# Patient Record
Sex: FEMALE | Race: BLACK OR AFRICAN AMERICAN | NOT HISPANIC OR LATINO | Employment: UNEMPLOYED | URBAN - METROPOLITAN AREA
[De-identification: names, ages, dates, MRNs, and addresses within clinical notes are randomized per-mention and may not be internally consistent; named-entity substitution may affect disease eponyms.]

---

## 2017-01-24 ENCOUNTER — PRE VISIT (OUTPATIENT)
Dept: CARDIOLOGY | Facility: CLINIC | Age: 52
End: 2017-01-24

## 2017-01-24 DIAGNOSIS — E78.5 HYPERLIPIDEMIA LDL GOAL <70: Primary | ICD-10-CM

## 2017-01-24 DIAGNOSIS — E11.9 TYPE 2 DIABETES, HBA1C GOAL < 7% (H): Primary | ICD-10-CM

## 2017-01-24 DIAGNOSIS — I10 HTN (HYPERTENSION): Primary | ICD-10-CM

## 2017-01-24 RX ORDER — LISINOPRIL AND HYDROCHLOROTHIAZIDE 20; 25 MG/1; MG/1
TABLET ORAL
Qty: 90 TABLET | Refills: 3 | Status: SHIPPED | OUTPATIENT
Start: 2017-01-24 | End: 2017-12-20

## 2017-01-24 RX ORDER — HYDRALAZINE HYDROCHLORIDE 25 MG/1
TABLET, FILM COATED ORAL
Qty: 90 TABLET | Refills: 3 | Status: SHIPPED | OUTPATIENT
Start: 2017-01-24 | End: 2017-01-25

## 2017-01-25 ENCOUNTER — OFFICE VISIT (OUTPATIENT)
Dept: PHARMACY | Facility: CLINIC | Age: 52
End: 2017-01-25
Payer: COMMERCIAL

## 2017-01-25 ENCOUNTER — OFFICE VISIT (OUTPATIENT)
Dept: FAMILY MEDICINE | Facility: CLINIC | Age: 52
End: 2017-01-25

## 2017-01-25 ENCOUNTER — OFFICE VISIT (OUTPATIENT)
Dept: CARDIOLOGY | Facility: CLINIC | Age: 52
End: 2017-01-25
Attending: INTERNAL MEDICINE
Payer: COMMERCIAL

## 2017-01-25 VITALS
SYSTOLIC BLOOD PRESSURE: 100 MMHG | BODY MASS INDEX: 30.83 KG/M2 | DIASTOLIC BLOOD PRESSURE: 64 MMHG | OXYGEN SATURATION: 99 % | HEIGHT: 63 IN | HEART RATE: 69 BPM | WEIGHT: 174 LBS

## 2017-01-25 VITALS
DIASTOLIC BLOOD PRESSURE: 64 MMHG | BODY MASS INDEX: 30.83 KG/M2 | WEIGHT: 174 LBS | HEART RATE: 69 BPM | SYSTOLIC BLOOD PRESSURE: 100 MMHG

## 2017-01-25 DIAGNOSIS — M35.9 UNDIFFERENTIATED CONNECTIVE TISSUE DISEASE (H): ICD-10-CM

## 2017-01-25 DIAGNOSIS — E11.9 DM TYPE 2, GOAL HBA1C 7%-8% (H): Primary | ICD-10-CM

## 2017-01-25 DIAGNOSIS — E11.69 HYPERLIPIDEMIA ASSOCIATED WITH TYPE 2 DIABETES MELLITUS (H): ICD-10-CM

## 2017-01-25 DIAGNOSIS — E11.59 HYPERTENSION ASSOCIATED WITH DIABETES (H): ICD-10-CM

## 2017-01-25 DIAGNOSIS — E78.5 HYPERLIPIDEMIA LDL GOAL <70: ICD-10-CM

## 2017-01-25 DIAGNOSIS — E78.5 HYPERLIPIDEMIA ASSOCIATED WITH TYPE 2 DIABETES MELLITUS (H): ICD-10-CM

## 2017-01-25 DIAGNOSIS — E55.9 VITAMIN D DEFICIENCY: Primary | ICD-10-CM

## 2017-01-25 DIAGNOSIS — I25.10 CORONARY ARTERY DISEASE INVOLVING NATIVE HEART, ANGINA PRESENCE UNSPECIFIED, UNSPECIFIED VESSEL OR LESION TYPE: ICD-10-CM

## 2017-01-25 DIAGNOSIS — I10 ESSENTIAL HYPERTENSION: Primary | ICD-10-CM

## 2017-01-25 DIAGNOSIS — E11.69 TYPE 2 DIABETES MELLITUS WITH OTHER SPECIFIED COMPLICATION (H): ICD-10-CM

## 2017-01-25 DIAGNOSIS — I15.2 HYPERTENSION ASSOCIATED WITH DIABETES (H): ICD-10-CM

## 2017-01-25 DIAGNOSIS — M79.7 FIBROMYALGIA: ICD-10-CM

## 2017-01-25 LAB
ALBUMIN SERPL-MCNC: 4.1 G/DL (ref 3.4–5)
ALBUMIN UR-MCNC: NEGATIVE MG/DL
ALP SERPL-CCNC: 67 U/L (ref 40–150)
ALT SERPL W P-5'-P-CCNC: 25 U/L (ref 0–50)
ANION GAP SERPL CALCULATED.3IONS-SCNC: 9 MMOL/L (ref 3–14)
APPEARANCE UR: CLEAR
AST SERPL W P-5'-P-CCNC: 19 U/L (ref 0–45)
BACTERIA #/AREA URNS HPF: ABNORMAL /HPF
BASOPHILS # BLD AUTO: 0.1 10E9/L (ref 0–0.2)
BASOPHILS NFR BLD AUTO: 0.5 %
BILIRUB SERPL-MCNC: 0.3 MG/DL (ref 0.2–1.3)
BILIRUB UR QL STRIP: NEGATIVE
BUN SERPL-MCNC: 23 MG/DL (ref 7–30)
CALCIUM SERPL-MCNC: 9.1 MG/DL (ref 8.5–10.1)
CHLORIDE SERPL-SCNC: 106 MMOL/L (ref 94–109)
CHOLEST SERPL-MCNC: 118 MG/DL
CO2 SERPL-SCNC: 23 MMOL/L (ref 20–32)
COLOR UR AUTO: YELLOW
CREAT SERPL-MCNC: 1 MG/DL (ref 0.52–1.04)
CREAT UR-MCNC: 66 MG/DL
CRP SERPL-MCNC: <2.9 MG/L (ref 0–8)
DIFFERENTIAL METHOD BLD: ABNORMAL
EOSINOPHIL # BLD AUTO: 0.4 10E9/L (ref 0–0.7)
EOSINOPHIL NFR BLD AUTO: 3.1 %
ERYTHROCYTE [DISTWIDTH] IN BLOOD BY AUTOMATED COUNT: 13.4 % (ref 10–15)
ERYTHROCYTE [SEDIMENTATION RATE] IN BLOOD BY WESTERGREN METHOD: 23 MM/H (ref 0–30)
GFR SERPL CREATININE-BSD FRML MDRD: 59 ML/MIN/1.7M2
GLUCOSE SERPL-MCNC: 79 MG/DL (ref 70–99)
GLUCOSE UR STRIP-MCNC: NEGATIVE MG/DL
HCT VFR BLD AUTO: 33.7 % (ref 35–47)
HDLC SERPL-MCNC: 35 MG/DL
HGB BLD-MCNC: 11.1 G/DL (ref 11.7–15.7)
HGB UR QL STRIP: NEGATIVE
IMM GRANULOCYTES # BLD: 0 10E9/L (ref 0–0.4)
IMM GRANULOCYTES NFR BLD: 0.2 %
KETONES UR STRIP-MCNC: NEGATIVE MG/DL
LDLC SERPL CALC-MCNC: 58 MG/DL
LEUKOCYTE ESTERASE UR QL STRIP: NEGATIVE
LYMPHOCYTES # BLD AUTO: 3 10E9/L (ref 0.8–5.3)
LYMPHOCYTES NFR BLD AUTO: 23.8 %
MCH RBC QN AUTO: 26.1 PG (ref 26.5–33)
MCHC RBC AUTO-ENTMCNC: 32.9 G/DL (ref 31.5–36.5)
MCV RBC AUTO: 79 FL (ref 78–100)
MONOCYTES # BLD AUTO: 0.8 10E9/L (ref 0–1.3)
MONOCYTES NFR BLD AUTO: 6 %
MUCOUS THREADS #/AREA URNS LPF: PRESENT /LPF
NEUTROPHILS # BLD AUTO: 8.3 10E9/L (ref 1.6–8.3)
NEUTROPHILS NFR BLD AUTO: 66.4 %
NITRATE UR QL: NEGATIVE
NONHDLC SERPL-MCNC: 83 MG/DL
NRBC # BLD AUTO: 0 10*3/UL
NRBC BLD AUTO-RTO: 0 /100
PH UR STRIP: 6 PH (ref 5–7)
PLATELET # BLD AUTO: 328 10E9/L (ref 150–450)
POTASSIUM SERPL-SCNC: 4.4 MMOL/L (ref 3.4–5.3)
PROT SERPL-MCNC: 7.4 G/DL (ref 6.8–8.8)
PROT UR-MCNC: 0.08 G/L
PROT/CREAT 24H UR: 0.12 G/G CR (ref 0–0.2)
RBC # BLD AUTO: 4.26 10E12/L (ref 3.8–5.2)
RBC #/AREA URNS AUTO: 1 /HPF (ref 0–2)
SODIUM SERPL-SCNC: 138 MMOL/L (ref 133–144)
SP GR UR STRIP: 1.01 (ref 1–1.03)
SQUAMOUS #/AREA URNS AUTO: 2 /HPF (ref 0–1)
TRIGL SERPL-MCNC: 125 MG/DL
URN SPEC COLLECT METH UR: ABNORMAL
UROBILINOGEN UR STRIP-MCNC: 0 MG/DL (ref 0–2)
WBC # BLD AUTO: 12.5 10E9/L (ref 4–11)
WBC #/AREA URNS AUTO: 3 /HPF (ref 0–2)

## 2017-01-25 PROCEDURE — 99212 OFFICE O/P EST SF 10 MIN: CPT | Mod: 27,ZF

## 2017-01-25 PROCEDURE — 99607 MTMS BY PHARM ADDL 15 MIN: CPT | Performed by: PHARMACIST

## 2017-01-25 PROCEDURE — 85652 RBC SED RATE AUTOMATED: CPT | Performed by: INTERNAL MEDICINE

## 2017-01-25 PROCEDURE — 99606 MTMS BY PHARM EST 15 MIN: CPT | Performed by: PHARMACIST

## 2017-01-25 PROCEDURE — 84156 ASSAY OF PROTEIN URINE: CPT | Performed by: INTERNAL MEDICINE

## 2017-01-25 PROCEDURE — 85025 COMPLETE CBC W/AUTO DIFF WBC: CPT | Performed by: INTERNAL MEDICINE

## 2017-01-25 PROCEDURE — 99214 OFFICE O/P EST MOD 30 MIN: CPT | Mod: ZP | Performed by: INTERNAL MEDICINE

## 2017-01-25 PROCEDURE — 82306 VITAMIN D 25 HYDROXY: CPT | Performed by: INTERNAL MEDICINE

## 2017-01-25 PROCEDURE — 86140 C-REACTIVE PROTEIN: CPT | Performed by: INTERNAL MEDICINE

## 2017-01-25 PROCEDURE — 81001 URINALYSIS AUTO W/SCOPE: CPT | Performed by: INTERNAL MEDICINE

## 2017-01-25 PROCEDURE — 36415 COLL VENOUS BLD VENIPUNCTURE: CPT | Performed by: INTERNAL MEDICINE

## 2017-01-25 RX ORDER — METOPROLOL SUCCINATE 100 MG/1
100 TABLET, EXTENDED RELEASE ORAL DAILY
Qty: 90 TABLET | Refills: 3 | Status: ON HOLD | OUTPATIENT
Start: 2017-01-25 | End: 2017-08-04

## 2017-01-25 RX ORDER — HYDRALAZINE HYDROCHLORIDE 25 MG/1
12.5 TABLET, FILM COATED ORAL DAILY
Qty: 90 TABLET | Refills: 3 | Status: SHIPPED | OUTPATIENT
Start: 2017-01-25 | End: 2017-04-04

## 2017-01-25 RX ORDER — ERGOCALCIFEROL 1.25 MG/1
50000 CAPSULE, LIQUID FILLED ORAL
Qty: 8 CAPSULE | Refills: 0 | Status: SHIPPED
Start: 2017-01-25 | End: 2019-10-30

## 2017-01-25 RX ORDER — CLOPIDOGREL BISULFATE 75 MG/1
75 TABLET ORAL DAILY
Qty: 90 TABLET | Refills: 3 | Status: SHIPPED | OUTPATIENT
Start: 2017-01-25 | End: 2017-12-20

## 2017-01-25 ASSESSMENT — PAIN SCALES - GENERAL
PAINLEVEL: NO PAIN (0)
PAINLEVEL: MODERATE PAIN (4)

## 2017-01-25 NOTE — NURSING NOTE
Chief Complaint   Patient presents with     Recheck Medication     Pt is here to follow up on medications.      Shyanne Stallworth LPN January 25, 2017 12:55 PM

## 2017-01-25 NOTE — PATIENT INSTRUCTIONS
[facility] Medication Refill Request Information:  * Please contact your pharmacy regarding ANY request for medication refills.  ** [facility] Prescription Fax = [phone]  * Please allow 3 business days for routine medication refills.  * Please allow 5 business days for controlled substance medication refills.     [facility] Test Result notification information:  *You will be notified with in 7-10 days of your appointment day regarding the results of your test.  If you are on MyChart you will be notified as soon as the provider has reviewed the results and signed off on them.

## 2017-01-25 NOTE — PROGRESS NOTES
"SUBJECTIVE/OBJECTIVE:                                                    Janine Cornell is a 51 year old female coming in for a follow up visit for Medication Therapy Management.  She was referred to me from Dr. Solano.     Chief Complaint: Lantus is no longer covered. Dr. Solano would like me to look into it.  Pt is very resistant to trying another medication.  Vitamin D: whenever she goes off of the 50,000 units daily, she becomes deficient.   One Touch Verio strips.    Allergies/ADRs: Reviewed in Epic  Tobacco: 0-1 pack per day - is not interested in quitting Tobacco Cessation Action Plan: Lives in a house with smokers. Has not tried anything to quit.   Alcohol: Less than 1 beverage / month  Caffeine: 2 cups/day of coffee  Activity: Not so much in the winter.  PMH: Per patient 5 stents, last placed Feb of this year, diabetes, \"mystery autoimmune disease\" she is seeing a rheumotologist     Medication Adherence: pt is on a lot of medications, only takes them at night cause they make her so tired. \"Its a pain\" having to take them. She gets Nausea, headaches, extreme fatigue, limbs become heavy.     Vitamin D deficiency: Pt is taking Vitamin D 50,000 units q weekly. Everytime she comes off of this her Vitamin D levels decrease per patient report.     Hypertension: Current medications include Hydralazine 50mg qHS, Lisinopril 20mg daily, HCTZ 25mg daily, Metoprolol XL 100mg daily.  Patient reports the following medication side effects: dizziness.    Diabetes: Pt currently taking Lantus 40 units daily, Metformin 1000mg XR daily, has had stomach problems at higher doses. Has loose some at 1000mg as well, but it is manageable. She states her insurance is no longer covering her brand of test strips. She thinks they may be covering One Touch, but is not sure.   SMBG: three times daily, four times daily.   Symptoms of low blood sugar? dizzy, weak, blurred vision, nausea. Frequency of hypoglycemia? monthly.   Aspirin: " Taking 81mg daily and has the following issues: bruising, very easily bruises. No nose bleeds/ gum bleeds.     Fibromyalgia/ Arthritis: Pt is taking APAP, Cyclobenzaprine, Tramadol. She takes APAP infrequently she takes the minimum amount due to being afraid of causing kidney/ liver issues. She does acupuncture which is effective helping her reduce her pain. She is taking Cyclobenzaprine 10mg qHS prn, gets groggy for the next few days, but when the pain is bad enough she takes it twice a day and just deals with the grogginess. She takes a 1/2 tablet prn pain most of the time. She usually only takes 1 pain med at a time. Does not like to mix pain meds and muscle relaxers (eg. APAP and cyclobenzaprine). Ketoprofen compounded gel is no longer covered by insurance.     Current labs include:  BP Readings from Last 3 Encounters:   01/25/17 100/64   01/25/17 100/64   12/29/16 107/73     A1C      7.2   12/26/2016  A1C      7.7   7/27/2016  A1C      7.9   4/29/2016  A1C      7.0   1/27/2016  A1C      6.6   10/30/2015    CHOL      106   7/27/2016  TRIG      104   7/27/2016  HDL       36   7/27/2016  LDL       48   7/27/2016    Liver Function Studies -   Recent Labs   Lab Test  07/27/16   1618   PROTTOTAL  7.9   ALBUMIN  4.4   BILITOTAL  0.3   ALKPHOS  72   AST  18   ALT  23       MICROL       13   8/11/2015  No results found for this basename: microalbumin    Last Basic Metabolic Panel:  NA      138   7/27/2016   POTASSIUM      4.1   7/27/2016  CHLORIDE      104   7/27/2016  BUN       24   7/27/2016  CR     1.26   7/27/2016    GFR ESTIMATE IF BLACK   Date Value Ref Range Status   01/25/2017 71 >60 mL/min/1.7m2 Final     Comment:      GFR Calc   12/26/2016 62 >60 mL/min/1.7m2 Final     Comment:      GFR Calc   07/27/2016 54* >60 mL/min/1.7m2 Final     Comment:      GFR Calc     TSH   Date Value Ref Range Status   12/26/2016 0.24* 0.40 - 4.00 mU/L Final   ]  LMP  (LMP  Unknown)    Most Recent Immunizations   Administered Date(s) Administered     Influenza (IIV3) 10/14/2016     Influenza Vaccine IM 3yrs+ 4 Valent IIV4 09/29/2015     Pneumococcal (PCV 13) 12/29/2016     TDAP (ADACEL AGES 11-64) 11/30/2006     TDAP (BOOSTRIX AGES 10-64) 12/29/2016     ASSESSMENT:                                                       Current medications were reviewed today.     Medication Adherence: needs improvement - see below.   Pt states she is very sensitive to medications, does not like changing or adding on medications. She takes all her medications in the evening as she has the follow side effects: fatigue, nausea, headaches. Much of the time was spent just going through her extensive medication list.     Vitamin D deficiency: Needs improvement. We will continue high dose vitamin D for another 2 months, will check Vitamin D level at the end of it.     Hypertension: Needs improvement. BP at goal today <140/90. Pt has not stopped Hydralazine. She states the additional directions given to her (only to take if SBP >140) are too complicated. I recommended she hold this medication for now. Her BP is very well controlled.     Diabetes: Stable. A1c has decreased to 7.2%. Pt is resistant to changing Lantus. I think it would be appropriate to try a PA. If it is not e    Fibromyalgia/ Arthritis: Needs improvement. Voltaren gel should be a covered alternative to Ketoprofen gel. Dr. Solano did not respond to this at last visit. Pt does not want to try a different muscle relaxant at this time.     PLAN:                                                      Dr. Peace...  1. I am having patient hold her Hydralazine. She is confused about the prn directions.     Dr. Solano consider...  1. Voltaren Gel in place of Ketoprofen compounded gel. It will likely be covered.   2. PA for Lantus- pt would not like to try another insulin at this time.     Pt to...  1. Continue high dose vitamin D for 2 more months  with 2 month lab.    I spent 45 minutes with this patient today.  All changes were made via collaborative practice agreement with Kash Solano A copy of the visit note was provided to the patient's primary care provider.    Will follow up in 1 month to review allergies med use, diabetes, asthma, and try to cut down on med use.    The patient was given a summary of these recommendations as an after visit summary.     Jose Luis Metcalf, PharmD  [facility] Pharmacist    Phone: [phone]

## 2017-01-25 NOTE — Clinical Note
Dr. Solano consider... 1. Voltaren Gel in place of Ketoprofen compounded gel. It will likely be covered.  2. PA for Lantus- pt would not like to try another insulin at this time.

## 2017-01-25 NOTE — PATIENT INSTRUCTIONS
Recommendations from today's MTM visit:                                                      1. Talk to you Cardiologist about stopping your Hydralazine. You do not need this anymore.     2. Take your Vitamin D 50,000 units for another 2 months, we will recheck these levels when you have completed this therapy.     3. I will talk with Dr. Solano about getting a PA for Lantus and trying Voltaren gel.     Next MTM visit: 2/24/17 after your visit with Dr. Solano.     To schedule another MTM appointment, please call the clinic directly or you may call the MTM scheduling line at [phone] or toll-free at [phone].     My Clinical Pharmacist's contact information:                                                      It was a pleasure seeing you today!  Please feel free to contact me with any questions or concerns you have.      Jose Luis Metcalf, PharmD  [facility] Pharmacist    Phone: [phone]     You may receive a survey about the MTM services you received.  I would appreciate your feedback to help me serve you better in the future. Please fill it out and return it when you can. Your comments will be anonymous.

## 2017-01-25 NOTE — PATIENT INSTRUCTIONS
Return to clinic in 6 months.  Fasting labs will be needed prior to this appointment.     Please do not hesitate to call if you have any cardiology related questions or concerns, or need to schedule an appointment, at [phone].         Cardiology Medication Refill Request Information:  * Please contact your pharmacy regarding ANY request for medication refills.  ** [facility] Prescription Fax = [phone]  * Please allow 3 business days for routine medication refills.    Cardiology Test Result notification information:  *You will be notified with in 7-10 days of your appointment day regarding the results of your test. If you are on MyChart you will be notified as soon as the provider has reviewed the results and signed off on them. Please call RN Care Coordinator with questions regarding results.

## 2017-01-25 NOTE — MR AVS SNAPSHOT
After Visit Summary   1/25/2017    Janine Cornell    MRN: [id number]           Patient Information     Date Of Birth          1965        Visit Information        Provider Department      1/25/2017 3:00 PM Jose Luis Metcalf [facility] Medication Therapy Management        Care Instructions    Recommendations from today's MTM visit:                                                      1. Talk to you Cardiologist about stopping your Hydralazine. You do not need this anymore.     2. Take your Vitamin D 50,000 units for another 2 months, we will recheck these levels when you have completed this therapy.     3. I will talk with Dr. Solano about getting a PA for Lantus and trying Voltaren.     Next MTM visit: 2/24/17 after your visit with Dr. Solano.     To schedule another MTM appointment, please call the clinic directly or you may call the MTM scheduling line at [phone] or toll-free at [phone].     My Clinical Pharmacist's contact information:                                                      It was a pleasure seeing you today!  Please feel free to contact me with any questions or concerns you have.      Jose Luis Metcalf, PharmD  [facility] Pharmacist    Phone: [phone]     You may receive a survey about the MTM services you received.  I would appreciate your feedback to help me serve you better in the future. Please fill it out and return it when you can. Your comments will be anonymous.            Follow-ups after your visit        Your next 10 appointments already scheduled     Feb 24, 2017  2:05 PM   (Arrive by 1:50 PM)   Return Visit with Kash Solano MD   [facility] ([facility])    [address] [place] [id number] [phone]              Who to contact     If you have questions or need follow up information about today's clinic visit or your schedule please contact [facility] MEDICATION THERAPY  MANAGEMENT directly at No information on file..  Normal or non-critical lab and imaging results will be communicated to you by [employer], letter or phone within 4 business days after the clinic has received the results. If you do not hear from us within 7 days, please contact the clinic through [employer] or phone. If you have a critical or abnormal lab result, we will notify you by phone as soon as possible.  Submit refill requests through [employer] or call your pharmacy and they will forward the refill request to us. Please allow 3 business days for your refill to be completed.          Additional Information About Your Visit        [employer] Information     [employer] gives you secure access to your electronic health record. If you see a primary care provider, you can also send messages to your care team and make appointments. If you have questions, please call your primary care clinic.  If you do not have a primary care provider, please call [phone] and they will assist you.        Care EveryWhere ID     This is your Care EveryWhere ID. This could be used by other organizations to access your [facility] medical records  [id number]        Your Vitals Were     Last Period                   (LMP Unknown)            Blood Pressure from Last 3 Encounters:   01/25/17 100/64   01/25/17 100/64   12/29/16 107/73    Weight from Last 3 Encounters:   01/25/17 174 lb (78.926 kg)   01/25/17 174 lb (78.926 kg)   12/29/16 172 lb 6.4 oz (78.2 kg)              Today, you had the following     No orders found for display         Today's Medication Changes          These changes are accurate as of: 1/25/17  3:55 PM.  If you have any questions, ask your nurse or doctor.               These medicines have changed or have updated prescriptions.        Dose/Directions    hydroxychloroquine 200 MG tablet   Commonly known as:  PLAQUENIL   This may have changed:  how much to take   Used for:  Rheumatoid arthritis(714.0)        Dose:  400 mg    Take 2 tablets (400 mg) by mouth daily   Quantity:  180 tablet   Refills:  1       insulin glargine 100 UNIT/ML injection   Commonly known as:  LANTUS   This may have changed:  how much to take   Used for:  Type 2 diabetes mellitus without complication (H)        Dose:  30 Units   Inject 30 Units Subcutaneous daily   Quantity:  9 mL   Refills:  11       Ketoprofen Powd   This may have changed:  additional instructions   Used for:  Fibromyalgia        Dose:  1 g   Apply 1 g topically 2 times daily as needed   Quantity:  100 g   Refills:  5                Primary Care Provider Office Phone # Fax #    Kash Solano [phone]       [facility] [address] [place] [id number]        Thank you!     Thank you for choosing [facility] MEDICATION THERAPY MANAGEMENT  for your care. Our goal is always to provide you with excellent care. Hearing back from our patients is one way we can continue to improve our services. Please take a few minutes to complete the written survey that you may receive in the mail after your visit with us. Thank you!             Your Updated Medication List - Protect others around you: Learn how to safely use, store and throw away your medicines at www.disposemymeds.org.          This list is accurate as of: 1/25/17  3:55 PM.  Always use your most recent med list.                   Brand Name Dispense Instructions for use    acetaminophen 325 MG tablet    TYLENOL    250 tablet    Take 1-2 tablets (325-650 mg) by mouth every 6 hours as needed for pain (headache)       AEROSPAN 80 MCG/ACT Aers   Generic drug:  Flunisolide HFA      INHALE 2 PUFFS PO BID.  RINSE MOUTH AFTERWARDS       * albuterol 108 (90 BASE) MCG/ACT Inhaler    PROAIR HFA/PROVENTIL HFA/VENTOLIN HFA    3 Inhaler    Inhale 2 puffs into the lungs every 6 hours as needed for shortness of breath / dyspnea or wheezing       * albuterol (2.5 MG/3ML) 0.083% neb solution      INL 1 VIAL VIA NEBULIZATION  Q 4 TO 6 HOURS PRN       ASPIRIN LOW DOSE 81 MG EC tablet   Generic drug:  aspirin      TK 1 T PO D       azelastine 0.1 % spray    ASTELIN    1 Bottle    Spray 2 sprays into both nostrils 2 times daily       blood glucose monitoring lancets     2 Box    Use to test blood sugar four times daily or as directed.       blood glucose monitoring meter device kit    no brand specified    1 kit    Use to test blood sugar 4 X times daily or as directed.       blood glucose monitoring test strip    no brand specified    360 each    1 strip by In Vitro route 4 times daily Test as directed. Please dispense three months and three months of refills. Thank you.       clopidogrel 75 MG tablet    PLAVIX    90 tablet    TAKE 1 TABLET BY MOUTH EVERY DAY       cyclobenzaprine 10 MG tablet    FLEXERIL    180 tablet    Take 1 tablet (10 mg) by mouth 2 times daily as needed for muscle spasms       desonide 0.05 % cream    DESOWEN     Apply topically 2 times daily       dicyclomine 20 MG tablet    BENTYL    60 tablet    Take 1 tablet (20 mg) by mouth 4 times daily as needed       diphenhydrAMINE 25 MG capsule    BENADRYL     TK 1 TO 2 CS PO QHS       EPIPEN 2-RIKY 0.3 MG/0.3ML injection   Generic drug:  EPINEPHrine      INJECT 0.3 MG INTO THE MUSCLE PRF ANAPHYALAXIS       ESOMEPRAZOLE MAGNESIUM PO      Take 20 mg by mouth 2 times daily (before meals)       ferrous gluconate 324 (38 FE) MG tablet    FERGON    100 tablet    Take 1 tablet (324 mg) by mouth 2 times daily       fexofenadine 180 MG tablet    ALLEGRA    90 tablet    Take 1 tablet by mouth daily as needed.       fluocinolone 0.01 % solution    SYNALAR     Apply topically daily as needed       fluocinonide 0.05 % solution    LIDEX    60 mL    Apply topically 2 times daily To areas of itch on the scalp as needed.       fluticasone 50 MCG/ACT spray    FLONASE     U 2 SPRAYS IEN D.       folic acid 1 MG tablet    FOLVITE         * hydrALAZINE 50 MG tablet    APRESOLINE     TK 1 T PO D        * hydrALAZINE 25 MG tablet    APRESOLINE    90 tablet    SEE INCLUDED DIRECTIONS       hydroxychloroquine 200 MG tablet    PLAQUENIL    180 tablet    Take 2 tablets (400 mg) by mouth daily       insulin glargine 100 UNIT/ML injection    LANTUS    9 mL    Inject 30 Units Subcutaneous daily       insulin pen needle 32G X 4 MM     300 each    Use 1 daily as directed.       ketoconazole 2 % shampoo    NIZORAL     Apply topically daily as needed       Ketoprofen Powd     100 g    Apply 1 g topically 2 times daily as needed       lidocaine 5 % ointment    XYLOCAINE    50 g    Apply 1 g topically 2 times daily as needed for moderate pain       lisinopril-hydrochlorothiazide 20-25 MG per tablet    PRINZIDE/ZESTORETIC    90 tablet    TAKE 1 TABLET BY MOUTH DAILY       Magnesium Oxide -Mg Supplement 250 MG Tabs      TK 1 T PO BID. REDUCE IF STOOLS LOOSEN       metFORMIN 500 MG 24 hr tablet    GLUCOPHAGE-XR    60 tablet    Take 2 tablets (1,000 mg) by mouth daily (with dinner)       metoprolol 100 MG 24 hr tablet    TOPROL-XL    30 tablet    Take 1 tablet (100 mg) by mouth daily       metroNIDAZOLE 1 % cream    NORITATE     Apply topically daily       mometasone 50 MCG/ACT spray    NASONEX     Spray 2 sprays into both nostrils daily       montelukast 10 MG tablet    SINGULAIR    30 tablet    Take 1 tablet (10 mg) by mouth At Bedtime       nitroglycerin 0.4 MG sublingual tablet    NITROSTAT    25 tablet    Place 1 tablet (0.4 mg) under the tongue every 5 minutes as needed for chest pain       nystatin 745768 UNITS Tabs tablet    MYCOSTATIN     TK 2 TS PO BID       olopatadine 0.1 % ophthalmic solution    PATANOL    5 mL    Place 1 drop into both eyes 2 times daily as needed for allergies.       pravastatin 40 MG tablet    PRAVACHOL    30 tablet    Take 1 tablet (40 mg) by mouth daily       ranitidine 150 MG tablet    ZANTAC    60 tablet    Take 1 tablet (150 mg) by mouth 2 times daily       spironolactone 25 MG tablet     ALDACTONE    60 tablet    Take 2 tablets (50 mg) by mouth daily       * sucralfate 1 GM tablet    CARAFATE    40 tablet    Take 1 tablet (1 g) by mouth 4 times daily as needed       * sucralfate 1 GM tablet    CARAFATE    120 tablet    Take 1 tablet (1 g) by mouth 4 times daily as needed       traMADol 50 MG tablet    ULTRAM    60 tablet    Take 1 tablet (50 mg) by mouth every 8 hours as needed for moderate pain       TUMS 500 MG chewable tablet   Generic drug:  calcium carbonate      Take 3-4 chew tab by mouth daily as needed.       vitamin D 17304 UNIT capsule    ERGOCALCIFEROL    12 capsule    Take 1 capsule (50,000 Units) by mouth every 7 days       ZOFRAN PO          * Notice:  This list has 6 medication(s) that are the same as other medications prescribed for you. Read the directions carefully, and ask your doctor or other care provider to review them with you.

## 2017-01-25 NOTE — Clinical Note
1/25/2017      RE: Janine Cornell  [address] [facility] [address]       Dear Colleague,    Thank you for the opportunity to participate in the care of your patient, Janine Cornell, at the [facility]. Please see a copy of my visit note below.    HPI:       Ms Janine Cornell, who is a 51 yr -American patient with DM2, Hypothyroidism, PCOS, Dyslipidemia, HTN, and S/PNSTEMI (11/2011) s/p stent placement comes for follow-up.  She underwent a successful PCI and stent in distal RCA.  Patient had chest pain during shoveling snow.  She feels still fatigued but has not done too much.  She worries about the different non-CVD issues and her discussions with the physicians who treat her non-CVD problems.  She notices that she has less swelling at the extremities..  She complaints much less about chest pain and SOB. She denies palpitations.    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 Matias, neg w/u, Rx'd w/prednisone     Neuritis optic 1997     subacute autoimmune retrobulbar neuritis, Dr. 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/u     Abnormal glandular Papanicolaou smear of cervix 1992     Obesity      Migraine headaches      Ectasia, mammary duct      [facility], persistent nipple discharge     Hyperlipidemia LDL goal < 100      PCOS (polycystic ovarian syndrome)      PCOS     Chronic pancreatitis (H)      idiopathic, Tx: PPI, antioxidants, pancreatic enzymes     Elevated fasting glucose      Type 2 diabetes, HbA1c goal < 7% (H) 6/10     S/P coronary artery stent placement 11/1/2011     LAD x2; D1 x 1; RCA x1     NSTEMI (non-ST elevated myocardial infarction) (H) 11/1/2011     Iron deficiency anemia      Ventral hernia, unspecified, without  mention of obstruction or gangrene 7/2012     Costochondritis      Coronary artery disease      Gastro-oesophageal reflux disease      Mild persistent asthma      Seasonal affective disorder (H)      Numbness of feet      Common migraine      ASCVD (arteriosclerotic cardiovascular disease)      Ischemic cardiomyopathy      Uterine leiomyoma      Menorrhagia      Hernia      Costochondritis      PONV (postoperative nausea and vomiting)      Shortness of breath      Difficulty in walking(719.7)      Unspecified cerebral artery occlusion with cerebral infarction      Numbness and tingling      Chronic pain      Arthritis      Stented coronary artery      4 STENTS- 11/1/11       CURRENT MEDICATIONS:  Current Outpatient Prescriptions   Medication Sig Dispense Refill     hydrALAZINE (APRESOLINE) 25 MG tablet SEE INCLUDED DIRECTIONS 90 tablet 3     lisinopril-hydrochlorothiazide (PRINZIDE/ZESTORETIC) 20-25 MG per tablet TAKE 1 TABLET BY MOUTH DAILY 90 tablet 3     blood glucose monitoring (NO BRAND SPECIFIED) meter device kit Use to test blood sugar 4 X times daily or as directed. 1 kit 0     blood glucose monitoring (NO BRAND SPECIFIED) test strip 1 strip by In Vitro route 4 times daily Test as directed. Please dispense three months and three months of refills. Thank you. 360 each 3     diphenhydrAMINE (BENADRYL) 25 MG capsule TK 1 TO 2 CS PO QHS  4     EPIPEN 2-RIKY 0.3 MG/0.3ML injection INJECT 0.3 MG INTO THE MUSCLE PRF ANAPHYALAXIS  0     AEROSPAN 80 MCG/ACT AERS INHALE 2 PUFFS PO BID.  RINSE MOUTH AFTERWARDS  4     fluticasone (FLONASE) 50 MCG/ACT spray U 2 SPRAYS IEN D.  3     folic acid (FOLVITE) 1 MG tablet   4     hydrALAZINE (APRESOLINE) 50 MG tablet TK 1 T PO D  1     Magnesium Oxide -Mg Supplement 250 MG TABS TK 1 T PO BID. REDUCE IF STOOLS LOOSEN  11     nystatin (MYCOSTATIN) 634852 UNITS TABS tablet TK 2 TS PO BID  0     albuterol (2.5 MG/3ML) 0.083% neb solution INL 1 VIAL VIA NEBULIZATION Q 4 TO 6 HOURS PRN   1     ASPIRIN LOW DOSE 81 MG EC tablet TK 1 T PO D  3     dicyclomine (BENTYL) 20 MG tablet Take 1 tablet (20 mg) by mouth 4 times daily as needed 60 tablet 5     sucralfate (CARAFATE) 1 GM tablet Take 1 tablet (1 g) by mouth 4 times daily as needed 120 tablet 3     azelastine (ASTELIN) 0.1 % spray Spray 2 sprays into both nostrils 2 times daily 1 Bottle 3     clopidogrel (PLAVIX) 75 MG tablet TAKE 1 TABLET BY MOUTH EVERY DAY 90 tablet 0     fluocinolone (SYNALAR) 0.01 % solution Apply topically daily as needed       mometasone (NASONEX) 50 MCG/ACT spray Spray 2 sprays into both nostrils daily       traMADol (ULTRAM) 50 MG tablet Take 1 tablet (50 mg) by mouth every 8 hours as needed for moderate pain 60 tablet 3     cyclobenzaprine (FLEXERIL) 10 MG tablet Take 1 tablet (10 mg) by mouth 2 times daily as needed for muscle spasms 180 tablet 0     Ondansetron HCl (ZOFRAN PO)        pravastatin (PRAVACHOL) 40 MG tablet Take 1 tablet (40 mg) by mouth daily 30 tablet 11     spironolactone (ALDACTONE) 25 MG tablet Take 2 tablets (50 mg) by mouth daily 60 tablet 11     metFORMIN (GLUCOPHAGE-XR) 500 MG 24 hr tablet Take 2 tablets (1,000 mg) by mouth daily (with dinner) 60 tablet 11     montelukast (SINGULAIR) 10 MG tablet Take 1 tablet (10 mg) by mouth At Bedtime 30 tablet 1     blood glucose monitoring (ACCU-CHEK MULTICLIX) lancets Use to test blood sugar four times daily or as directed. 2 Box 11     insulin glargine (LANTUS) 100 UNIT/ML PEN Inject 30 Units Subcutaneous daily (Patient taking differently: Inject 40 Units Subcutaneous daily ) 9 mL 11     ESOMEPRAZOLE MAGNESIUM PO Take 20 mg by mouth 2 times daily (before meals)       insulin pen needle 32G X 4 MM Use 1 daily as directed. 300 each 3     hydroxychloroquine (PLAQUENIL) 200 MG tablet Take 2 tablets (400 mg) by mouth daily (Patient taking differently: Take 200 mg by mouth daily ) 180 tablet 1     acetaminophen (TYLENOL) 325 MG tablet Take 1-2 tablets (325-650 mg)  by mouth every 6 hours as needed for pain (headache) 250 tablet 0     ranitidine (ZANTAC) 150 MG tablet Take 1 tablet (150 mg) by mouth 2 times daily 60 tablet 2     vitamin D (ERGOCALCIFEROL) 99902 UNIT capsule Take 1 capsule (50,000 Units) by mouth every 7 days 12 capsule 0     metoprolol (TOPROL-XL) 100 MG 24 hr tablet Take 1 tablet (100 mg) by mouth daily 30 tablet 11     Ketoprofen POWD Apply 1 g topically 2 times daily as needed (Patient taking differently: Apply 1 g topically 2 times daily as needed CREAM) 100 g 5     lidocaine (XYLOCAINE) 5 % ointment Apply 1 g topically 2 times daily as needed for moderate pain 50 g 5     metroNIDAZOLE (NORITATE) 1 % cream Apply topically daily       desonide (DESOWEN) 0.05 % cream Apply topically 2 times daily       ketoconazole (NIZORAL) 2 % shampoo Apply topically daily as needed       fluocinonide (LIDEX) 0.05 % external solution Apply topically 2 times daily To areas of itch on the scalp as needed. 60 mL 11     ferrous gluconate (FERGON) 324 (38 FE) MG tablet Take 1 tablet (324 mg) by mouth 2 times daily 100 tablet PRN     sucralfate (CARAFATE) 1 GM tablet Take 1 tablet (1 g) by mouth 4 times daily as needed 40 tablet 1     nitroglycerin (NITROSTAT) 0.4 MG SL tablet Place 1 tablet (0.4 mg) under the tongue every 5 minutes as needed for chest pain 25 tablet 1     albuterol (PROAIR HFA, PROVENTIL HFA, VENTOLIN HFA) 108 (90 BASE) MCG/ACT inhaler Inhale 2 puffs into the lungs every 6 hours as needed for shortness of breath / dyspnea or wheezing 3 Inhaler 1     calcium carbonate (TUMS) 500 MG chewable tablet Take 3-4 chew tab by mouth daily as needed.       fexofenadine (ALLEGRA) 180 MG tablet Take 1 tablet by mouth daily as needed. 90 tablet 3     olopatadine (PATANOL) 0.1 % ophthalmic solution Place 1 drop into both eyes 2 times daily as needed for allergies. 5 mL 12       PAST SURGICAL HISTORY:  Past Surgical History   Procedure Laterality Date     C/section, low  transverse            Leep tx, cervical       s/p LEEP     C echo heart xthoracic,complete, w/o doppler  04     [facility] WNL, EF 60%     Upper gi endoscopy  10/21/08     mild gastritis, Dr. Rocky Parker ugi endoscopy w eus  08     Dr. Pastrana, possible chronic pancreatitis, fatty liver     Laparoscopic cholecystectomy  08     Dr. Ambrose     Hernia repair  2012     [place] [facility]     Int uterine fibriod embolization  10/29/2014     Cardiac surgery       cardiac stent- recent in 16; 4 other stents     Orbitotomy Right 3/15/2016     Procedure: ORBITOTOMY;  Surgeon: Myron Cyr MD;  Location: [facility]     Dilation and curettage N/A 2016     Procedure: DILATION AND CURETTAGE;  Surgeon: Nahed Butler MD;  Location: [facility]     Insert intrauterine device N/A 2016     Procedure: INSERT INTRAUTERINE DEVICE;  Surgeon: Nahed Butler MD;  Location: [facility]       ALLERGIES     Allergies   Allergen Reactions     Amoxicillin-Pot Clavulanate      Augmentin      Unknown possible hives and edema     Azithromycin      Diatrizoate Other (See Comments)     Pt wants this listed because she is allergic to shellfish      Imitrex [Sumatriptan]      Severe face/neck/chest tightness and flushing side effects      Penicillins Hives     Unknown      Pork Allergy      Stomach pain, cramping, diarrhea, itching, nausea and headaches     Shellfish Allergy Hives and Swelling     Sulfa Drugs Hives and Swelling     Zithromax [Azithromycin Dihydrate] Swelling     Unknown        FAMILY HISTORY:  Family History   Problem Relation Age of Onset     HEART DISEASE Brother 15     MI at 15, 16.      HEART DISEASE Father 50     heart attack     CEREBROVASCULAR DISEASE Father      DIABETES Father      Hypertension Father      Depression Father      C.A.D. Father      DIABETES Maternal Uncle      Hypertension Mother      Arthritis Mother      HEART DISEASE Mother      long qt syndrome      Thyroid Disease Mother      C.A.D. Mother      Hypertension Maternal Aunt      Hypertension Maternal Uncle      Arthritis Brother      he passed away, had severe arthritis at age 11     Arthritis Maternal Uncle      Eye Disorder Maternal Uncle      cataracts     Neurologic Disorder Sister      migraines     Neurologic Disorder Sister      migraines     Respiratory Son      asthma     CEREBROVASCULAR DISEASE Maternal Uncle      C.A.D. Brother      Family History Negative       neg for RA, SLE     Unknown/Adopted No family hx of      unknown neurological issues in her family, mother was adopted     Skin Cancer No family hx of      No known family hx of skin cancer       SOCIAL HISTORY:  Social History     Social History     Marital Status: Single     Spouse Name: N/A     Number of Children: 1     Years of Education: N/A     Occupational History      None      Social History Main Topics     Smoking status: Former Smoker -- 0.20 packs/day for 1 years     Types: Cigarettes     Quit date: 02/01/2016     Smokeless tobacco: Never Used     Alcohol Use: No     Drug Use: No     Sexual Activity: Not Currently     Other Topics Concern     Parent/Sibling W/ Cabg, Mi Or Angioplasty Before 65f 55m? Yes     Social History Narrative    Balanced Diet - sometimes    Osteoporosis Prevention Measures - Dairy servings per day: 2 servings weekly    Regular Exercise -  Yes Describe walking 4 times a week    Dental Exam - NO    Seatbelts used - Yes    Self Breast Exam - Yes    Abuse: Current or Past (Physical, Sexual or Emotional)- No    Do you have any concerns about STD's -  No    Do you feel safe in your environment - No    Guns stored in the home - No    Sunscreen used - Yes    Lipids -  YES - Date: 053102    Glucose -  YES - Date: 012804    Eye Exam - YES - Date: one year ago    Colon Cancer Screening - No    Hemoccults - NO    Pap Test -  YES - Date: 070904, remote history of LEEP    Mammography - YES - Date: last spring, would like to  discuss, needs a [facility] to [facility]    DEXA - NO    Immunizations reviewed and up to date - Yes, last td given in 1997 or 1998       ROS:   Constitutional: No fever, chills, or sweats. No weight gain/loss   ENT: No visual disturbance, ear ache, epistaxis, sore throat  Allergies/Immunologic: Negative.   Respiratory: No cough, hemoptysia  Cardiovascular: As per HPI  GI: No nausea, vomiting, hematemesis, melena, or hematochezia  : No urinary frequency, dysuria, or hematuria  Integument: Negative  Psychiatric: Negative  Neuro: Negative  Endocrinology: Negative   Musculoskeletal: Negative    EXAM:  LMP  (LMP Unknown)  In general, the patient is a pleasant female in no apparent distress.    HEENT: NC/AT.  PERRLA.  EOMI.  Sclerae white, not injected.  Nares clear.  Pharynx without erythema or exudate.  Dentition intact.    Neck: No adenopathy.  No thyromegaly. Carotids +4/4 bilaterally without bruits.  No jugular venous distension.   Heart: RRR. Normal S1, S2 splits physiologically. No murmur, rub, click, or gallop. The PMI is in the 5th ICS in the midclavicular line. There is no heave.    Lungs: CTA.  No ronchi, wheezes, rales.  No dullness to percussion.   Abdomen: Soft, nontender, nondistended. No organomegaly.  No bruits.   Extremities: No clubbing, cyanosis, or edema.  The pulses are +4/4 at the radial, brachial, femoral, popliteal, DP, and PT sites bilaterally.  No bruits are noted.  Neurologic: Alert and oriented to person/place/time, normal speech, gait and affect  Skin: No petechiae, purpura or rash.    Labs:  LIPID RESULTS:  Lab Results   Component Value Date    CHOL 106 07/27/2016    HDL 36* 07/27/2016    LDL 48 07/27/2016    TRIG 104 07/27/2016    CHOLHDLRATIO 3.5 07/29/2015    NHDL 69 07/27/2016       LIVER ENZYME RESULTS:  Lab Results   Component Value Date    AST 15 12/26/2016    ALT 19 12/26/2016       CBC RESULTS:  Lab Results   Component Value Date    WBC 12.5* 01/25/2017    RBC 4.26  01/25/2017    HGB 11.1* 01/25/2017    HCT 33.7* 01/25/2017    MCV 79 01/25/2017    MCH 26.1* 01/25/2017    MCHC 32.9 01/25/2017    RDW 13.4 01/25/2017     01/25/2017       BMP RESULTS:  Lab Results   Component Value Date     07/27/2016    POTASSIUM 4.1 07/27/2016    CHLORIDE 104 07/27/2016    CO2 26 07/27/2016    ANIONGAP 8 07/27/2016    GLC 87 07/27/2016    BUN 24 07/27/2016    CR 1.13* 12/26/2016    GFRESTIMATED 51* 12/26/2016    GFRESTBLACK 62 12/26/2016    KATHY 9.6 07/27/2016        A1C RESULTS:  Lab Results   Component Value Date    A1C 7.2* 12/26/2016       INR RESULTS:  Lab Results   Component Value Date    INR 0.97 08/25/2016    INR 0.98 05/20/2015       Procedures:      Assessment and Plan: ***      CC  Patient Care Team:  Kash Solano MD as PCP - General (Family Practice)  Milan Peace MD as MD (Cardiology)  Lauren Francis, RN as Nurse Coordinator (Cardiology)  Majo Mckinnon MD as MD (Rheumatology)  Jolene Duenas MD as MD (Dermatology)  Manny Chowdhury MD as MD (Physical Medicine & Rehabilitation - Pain Medicine)  DARLING THOMPSON      Please do not hesitate to contact me if you have any questions/concerns.     Sincerely,     Milan Peace MD

## 2017-01-25 NOTE — NURSING NOTE
Diet: Patient instructed regarding a heart healthy diet, including discussion of reduced fat and sodium intake. Patient demonstrated understanding of this information and agreed to call with further questions or concerns.  Labs: Patient was given results of the laboratory testing obtained today. Patient was instructed to return for the next laboratory testing in 6 months. Patient demonstrated understanding of this information and agreed to call with further questions or concerns.   Med Reconcile: Reviewed and verified all current medications with the patient. The updated medication list was printed and given to the patient.  Return Appointment: Patient given instructions regarding scheduling next clinic visit. Patient demonstrated understanding of this information and agreed to call with further questions or concerns.  Patient stated she understood all health information given and agreed to call with further questions or concerns.

## 2017-01-25 NOTE — Clinical Note
1/25/2017      RE: Janine Cornell  [address] [facility] [address]       HPI:       Ms Janine Cornell, who is a 51 yr -American patient with DM2, Hypothyroidism, PCOS, Dyslipidemia, HTN, and S/PNSTEMI (11/2011) s/p stent placement comes for follow-up.  She underwent a successful PCI and stent in distal RCA.  Patient had chest pain during shoveling snow.  She feels still fatigued but has not done too much.  She worries about the different non-CVD issues and her discussions with the physicians who treat her non-CVD problems.  She notices that she has less swelling at the extremities..  She complaints much less about chest pain and SOB. She denies palpitations.  She has a lot of stress in her family - she and her son live in the house of her mother together with her mother and there is a lot of tension between her mother and her.    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 Matias, neg w/u, Rx'd w/prednisone     Neuritis optic 1997     subacute autoimmune retrobulbar neuritis, Dr. 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/u     Abnormal glandular Papanicolaou smear of cervix 1992     Obesity      Migraine headaches      Ectasia, mammary duct      [facility], persistent nipple discharge     Hyperlipidemia LDL goal < 100      PCOS (polycystic ovarian syndrome)      PCOS     Chronic pancreatitis (H)      idiopathic, Tx: PPI, antioxidants, pancreatic enzymes     Elevated fasting glucose      Type 2 diabetes, HbA1c goal < 7% (H) 6/10     S/P coronary artery stent placement 11/1/2011     LAD x2; D1 x 1; RCA x1     NSTEMI (non-ST elevated myocardial infarction) (H) 11/1/2011     Iron deficiency anemia      Ventral hernia, unspecified, without mention of obstruction or gangrene 7/2012     Costochondritis       Coronary artery disease      Gastro-oesophageal reflux disease      Mild persistent asthma      Seasonal affective disorder (H)      Numbness of feet      Common migraine      ASCVD (arteriosclerotic cardiovascular disease)      Ischemic cardiomyopathy      Uterine leiomyoma      Menorrhagia      Hernia      Costochondritis      PONV (postoperative nausea and vomiting)      Shortness of breath      Difficulty in walking(719.7)      Unspecified cerebral artery occlusion with cerebral infarction      Numbness and tingling      Chronic pain      Arthritis      Stented coronary artery      4 STENTS- 11/1/11       CURRENT MEDICATIONS:  Current Outpatient Prescriptions   Medication Sig Dispense Refill     hydrALAZINE (APRESOLINE) 25 MG tablet SEE INCLUDED DIRECTIONS 90 tablet 3     lisinopril-hydrochlorothiazide (PRINZIDE/ZESTORETIC) 20-25 MG per tablet TAKE 1 TABLET BY MOUTH DAILY 90 tablet 3     blood glucose monitoring (NO BRAND SPECIFIED) meter device kit Use to test blood sugar 4 X times daily or as directed. 1 kit 0     blood glucose monitoring (NO BRAND SPECIFIED) test strip 1 strip by In Vitro route 4 times daily Test as directed. Please dispense three months and three months of refills. Thank you. 360 each 3     diphenhydrAMINE (BENADRYL) 25 MG capsule TK 1 TO 2 CS PO QHS  4     EPIPEN 2-RIKY 0.3 MG/0.3ML injection INJECT 0.3 MG INTO THE MUSCLE PRF ANAPHYALAXIS  0     AEROSPAN 80 MCG/ACT AERS INHALE 2 PUFFS PO BID.  RINSE MOUTH AFTERWARDS  4     fluticasone (FLONASE) 50 MCG/ACT spray U 2 SPRAYS IEN D.  3     folic acid (FOLVITE) 1 MG tablet   4     hydrALAZINE (APRESOLINE) 50 MG tablet TK 1 T PO D  1     Magnesium Oxide -Mg Supplement 250 MG TABS TK 1 T PO BID. REDUCE IF STOOLS LOOSEN  11     nystatin (MYCOSTATIN) 974295 UNITS TABS tablet TK 2 TS PO BID  0     albuterol (2.5 MG/3ML) 0.083% neb solution INL 1 VIAL VIA NEBULIZATION Q 4 TO 6 HOURS PRN  1     ASPIRIN LOW DOSE 81 MG EC tablet TK 1 T PO D  3      dicyclomine (BENTYL) 20 MG tablet Take 1 tablet (20 mg) by mouth 4 times daily as needed 60 tablet 5     sucralfate (CARAFATE) 1 GM tablet Take 1 tablet (1 g) by mouth 4 times daily as needed 120 tablet 3     azelastine (ASTELIN) 0.1 % spray Spray 2 sprays into both nostrils 2 times daily 1 Bottle 3     clopidogrel (PLAVIX) 75 MG tablet TAKE 1 TABLET BY MOUTH EVERY DAY 90 tablet 0     fluocinolone (SYNALAR) 0.01 % solution Apply topically daily as needed       mometasone (NASONEX) 50 MCG/ACT spray Spray 2 sprays into both nostrils daily       traMADol (ULTRAM) 50 MG tablet Take 1 tablet (50 mg) by mouth every 8 hours as needed for moderate pain 60 tablet 3     cyclobenzaprine (FLEXERIL) 10 MG tablet Take 1 tablet (10 mg) by mouth 2 times daily as needed for muscle spasms 180 tablet 0     Ondansetron HCl (ZOFRAN PO)        pravastatin (PRAVACHOL) 40 MG tablet Take 1 tablet (40 mg) by mouth daily 30 tablet 11     spironolactone (ALDACTONE) 25 MG tablet Take 2 tablets (50 mg) by mouth daily 60 tablet 11     metFORMIN (GLUCOPHAGE-XR) 500 MG 24 hr tablet Take 2 tablets (1,000 mg) by mouth daily (with dinner) 60 tablet 11     montelukast (SINGULAIR) 10 MG tablet Take 1 tablet (10 mg) by mouth At Bedtime 30 tablet 1     blood glucose monitoring (ACCU-CHEK MULTICLIX) lancets Use to test blood sugar four times daily or as directed. 2 Box 11     insulin glargine (LANTUS) 100 UNIT/ML PEN Inject 30 Units Subcutaneous daily (Patient taking differently: Inject 40 Units Subcutaneous daily ) 9 mL 11     ESOMEPRAZOLE MAGNESIUM PO Take 20 mg by mouth 2 times daily (before meals)       insulin pen needle 32G X 4 MM Use 1 daily as directed. 300 each 3     hydroxychloroquine (PLAQUENIL) 200 MG tablet Take 2 tablets (400 mg) by mouth daily (Patient taking differently: Take 200 mg by mouth daily ) 180 tablet 1     acetaminophen (TYLENOL) 325 MG tablet Take 1-2 tablets (325-650 mg) by mouth every 6 hours as needed for pain (headache) 250  tablet 0     ranitidine (ZANTAC) 150 MG tablet Take 1 tablet (150 mg) by mouth 2 times daily 60 tablet 2     vitamin D (ERGOCALCIFEROL) 92813 UNIT capsule Take 1 capsule (50,000 Units) by mouth every 7 days 12 capsule 0     metoprolol (TOPROL-XL) 100 MG 24 hr tablet Take 1 tablet (100 mg) by mouth daily 30 tablet 11     Ketoprofen POWD Apply 1 g topically 2 times daily as needed (Patient taking differently: Apply 1 g topically 2 times daily as needed CREAM) 100 g 5     lidocaine (XYLOCAINE) 5 % ointment Apply 1 g topically 2 times daily as needed for moderate pain 50 g 5     metroNIDAZOLE (NORITATE) 1 % cream Apply topically daily       desonide (DESOWEN) 0.05 % cream Apply topically 2 times daily       ketoconazole (NIZORAL) 2 % shampoo Apply topically daily as needed       fluocinonide (LIDEX) 0.05 % external solution Apply topically 2 times daily To areas of itch on the scalp as needed. 60 mL 11     ferrous gluconate (FERGON) 324 (38 FE) MG tablet Take 1 tablet (324 mg) by mouth 2 times daily 100 tablet PRN     sucralfate (CARAFATE) 1 GM tablet Take 1 tablet (1 g) by mouth 4 times daily as needed 40 tablet 1     nitroglycerin (NITROSTAT) 0.4 MG SL tablet Place 1 tablet (0.4 mg) under the tongue every 5 minutes as needed for chest pain 25 tablet 1     albuterol (PROAIR HFA, PROVENTIL HFA, VENTOLIN HFA) 108 (90 BASE) MCG/ACT inhaler Inhale 2 puffs into the lungs every 6 hours as needed for shortness of breath / dyspnea or wheezing 3 Inhaler 1     calcium carbonate (TUMS) 500 MG chewable tablet Take 3-4 chew tab by mouth daily as needed.       fexofenadine (ALLEGRA) 180 MG tablet Take 1 tablet by mouth daily as needed. 90 tablet 3     olopatadine (PATANOL) 0.1 % ophthalmic solution Place 1 drop into both eyes 2 times daily as needed for allergies. 5 mL 12       PAST SURGICAL HISTORY:  Past Surgical History   Procedure Laterality Date     C/section, low transverse            Leep tx, cervical       s/p  HUMERA RONDON echo heart xthoracic,complete, w/o doppler  04     [facility] WNL, EF 60%     Upper gi endoscopy  10/21/08     mild gastritis, Dr. Rocky Parker ugi endoscopy w eus  08     Dr. Pastrana, possible chronic pancreatitis, fatty liver     Laparoscopic cholecystectomy  08     Dr. Ambrose     Hernia repair  2012     done at [facility]     Int uterine fibriod embolization  10/29/2014     Cardiac surgery       cardiac stent- recent in 16; 4 other stents     Orbitotomy Right 3/15/2016     Procedure: ORBITOTOMY;  Surgeon: Myron Cyr MD;  Location: [facility]     Dilation and curettage N/A 2016     Procedure: DILATION AND CURETTAGE;  Surgeon: Nahed Butler MD;  Location: [facility]     Insert intrauterine device N/A 2016     Procedure: INSERT INTRAUTERINE DEVICE;  Surgeon: Nahed Butler MD;  Location: [facility]       ALLERGIES     Allergies   Allergen Reactions     Amoxicillin-Pot Clavulanate      Augmentin      Unknown possible hives and edema     Azithromycin      Diatrizoate Other (See Comments)     Pt wants this listed because she is allergic to shellfish      Imitrex [Sumatriptan]      Severe face/neck/chest tightness and flushing side effects      Penicillins Hives     Unknown      Pork Allergy      Stomach pain, cramping, diarrhea, itching, nausea and headaches     Shellfish Allergy Hives and Swelling     Sulfa Drugs Hives and Swelling     Zithromax [Azithromycin Dihydrate] Swelling     Unknown        FAMILY HISTORY:  Family History   Problem Relation Age of Onset     HEART DISEASE Brother 15     MI at 15, 16.      HEART DISEASE Father 50     heart attack     CEREBROVASCULAR DISEASE Father      DIABETES Father      Hypertension Father      Depression Father      C.A.D. Father      DIABETES Maternal Uncle      Hypertension Mother      Arthritis Mother      HEART DISEASE Mother      long qt syndrome     Thyroid Disease Mother      C.A.D. Mother      Hypertension Maternal  Aunt      Hypertension Maternal Uncle      Arthritis Brother      he passed away, had severe arthritis at age 11     Arthritis Maternal Uncle      Eye Disorder Maternal Uncle      cataracts     Neurologic Disorder Sister      migraines     Neurologic Disorder Sister      migraines     Respiratory Son      asthma     CEREBROVASCULAR DISEASE Maternal Uncle      C.A.D. Brother      Family History Negative       neg for RA, SLE     Unknown/Adopted No family hx of      unknown neurological issues in her family, mother was adopted     Skin Cancer No family hx of      No known family hx of skin cancer       SOCIAL HISTORY:  Social History     Social History     Marital Status: Single     Spouse Name: N/A     Number of Children: 1     Years of Education: N/A     Occupational History      None      Social History Main Topics     Smoking status: Former Smoker -- 0.20 packs/day for 1 years     Types: Cigarettes     Quit date: 02/01/2016     Smokeless tobacco: Never Used     Alcohol Use: No     Drug Use: No     Sexual Activity: Not Currently     Other Topics Concern     Parent/Sibling W/ Cabg, Mi Or Angioplasty Before 65f 55m? Yes     Social History Narrative    Balanced Diet - sometimes    Osteoporosis Prevention Measures - Dairy servings per day: 2 servings weekly    Regular Exercise -  Yes Describe walking 4 times a week    Dental Exam - NO    Seatbelts used - Yes    Self Breast Exam - Yes    Abuse: Current or Past (Physical, Sexual or Emotional)- No    Do you have any concerns about STD's -  No    Do you feel safe in your environment - No    Guns stored in the home - No    Sunscreen used - Yes    Lipids -  YES - Date: 053102    Glucose -  YES - Date: 012804    Eye Exam - YES - Date: one year ago    Colon Cancer Screening - No    Hemoccults - NO    Pap Test -  YES - Date: 070904, remote history of LEEP    Mammography - YES - Date: last spring, would like to discuss, needs a referral to [facility]    DEXA - NO  "   Immunizations reviewed and up to date - Yes, last td given in 1997 or 1998       ROS:   Constitutional: No fever, chills, or sweats. No weight gain/loss   ENT: No visual disturbance, ear ache, epistaxis, sore throat  Allergies/Immunologic: Negative.   Respiratory: No cough, hemoptysia  Cardiovascular: As per HPI  GI: No nausea, vomiting, hematemesis, melena, or hematochezia  : No urinary frequency, dysuria, or hematuria  Integument: Negative  Psychiatric: Negative  Neuro: Negative  Endocrinology: Negative   Musculoskeletal: Negative    EXAM:  /64 mmHg  Pulse 69  Ht 1.6 m (5' 3\")  Wt 78.926 kg (174 lb)  BMI 30.83 kg/m2  SpO2 99%  LMP  (LMP Unknown)  In general, the patient is a pleasant female in no apparent distress.    HEENT: NC/AT.  PERRLA.  EOMI.  Sclerae white, not injected.  Nares clear.  Pharynx without erythema or exudate.  Dentition intact.    Neck: No adenopathy.  No thyromegaly. Carotids +4/4 bilaterally without bruits.  No jugular venous distension.   Heart: RRR. Normal S1, S2 splits physiologically. No murmur, rub, click, or gallop. The PMI is in the 5th ICS in the midclavicular line. There is no heave.    Lungs: CTA.  No ronchi, wheezes, rales.  No dullness to percussion.   Abdomen: Soft, nontender, nondistended. No organomegaly.  No bruits.   Extremities: No clubbing, cyanosis, or edema.  The pulses are +4/4 at the radial, brachial, femoral, popliteal, DP, and PT sites bilaterally.  No bruits are noted.  Neurologic: Alert and oriented to person/place/time, normal speech, gait and affect  Skin: No petechiae, purpura or rash.    Labs:  LIPID RESULTS:  Lab Results   Component Value Date    CHOL 118 01/25/2017    HDL 35* 01/25/2017    LDL 58 01/25/2017    TRIG 125 01/25/2017    CHOLHDLRATIO 3.5 07/29/2015    NHDL 83 01/25/2017       LIVER ENZYME RESULTS:  Lab Results   Component Value Date    AST 19 01/25/2017    ALT 25 01/25/2017       CBC RESULTS:  Lab Results   Component Value Date    " WBC 12.5* 01/25/2017    RBC 4.26 01/25/2017    HGB 11.1* 01/25/2017    HCT 33.7* 01/25/2017    MCV 79 01/25/2017    MCH 26.1* 01/25/2017    MCHC 32.9 01/25/2017    RDW 13.4 01/25/2017     01/25/2017       BMP RESULTS:  Lab Results   Component Value Date     07/27/2016    POTASSIUM 4.1 07/27/2016    CHLORIDE 104 07/27/2016    CO2 26 07/27/2016    ANIONGAP 8 07/27/2016    GLC 87 07/27/2016    BUN 24 07/27/2016    CR 1.13* 12/26/2016    GFRESTIMATED 51* 12/26/2016    GFRESTBLACK 62 12/26/2016    KATHY 9.6 07/27/2016        A1C RESULTS:  Lab Results   Component Value Date    A1C 7.2* 12/26/2016       INR RESULTS:  Lab Results   Component Value Date    INR 0.97 08/25/2016    INR 0.98 05/20/2015     Assessment and Plan:     We discuss the results with  Patient:  We re-emphasize the importance of a heart healthy diet.  We discussed the importance of quit smoking.  We give her a referral to cardiac rehab.   Because of her low BP we change Hydralazine to 12.5 mg once daily. May take extra 12.5 mg once daily if SBP >140 mmHg.  Return to clinic in 6 months with CMP, Lipids and HgA1c.     Milan Peace MD, PhD  Professor of Medicine  Division of Cardiology    CC  Patient Care Team:  Kash Solano MD as PCP - General (Family Practice)  Milan Peace MD as MD (Cardiology)  Lauren Francis RN as Nurse Coordinator (Cardiology)  Majo Mckinnon MD as MD (Rheumatology)  Jolene Duenas MD as MD (Dermatology)  Manny Chowdhury MD as MD (Physical Medicine & Rehabilitation - Pain Medicine)  DARLING THOMPSON

## 2017-01-25 NOTE — PROGRESS NOTES
HPI:       Ms Janine Cornell, who is a 51 yr -American patient with DM2, Hypothyroidism, PCOS, Dyslipidemia, HTN, and S/PNSTEMI (11/2011) s/p stent placement comes for follow-up.  She underwent a successful PCI and stent in distal RCA.  Patient had chest pain during shoveling snow.  She feels still fatigued but has not done too much.  She worries about the different non-CVD issues and her discussions with the physicians who treat her non-CVD problems.  She notices that she has less swelling at the extremities..  She complaints much less about chest pain and SOB. She denies palpitations.  She has a lot of stress in her family - she and her son live in the house of her mother together with her mother and there is a lot of tension between her mother and her.    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 Matias, neg w/u, Rx'd w/prednisone     Neuritis optic 1997     subacute autoimmune retrobulbar neuritis, Dr. 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/u     Abnormal glandular Papanicolaou smear of cervix 1992     Obesity      Migraine headaches      Ectasia, mammary duct      [facility], persistent nipple discharge     Hyperlipidemia LDL goal < 100      PCOS (polycystic ovarian syndrome)      PCOS     Chronic pancreatitis (H)      idiopathic, Tx: PPI, antioxidants, pancreatic enzymes     Elevated fasting glucose      Type 2 diabetes, HbA1c goal < 7% (H) 6/10     S/P coronary artery stent placement 11/1/2011     LAD x2; D1 x 1; RCA x1     NSTEMI (non-ST elevated myocardial infarction) (H) 11/1/2011     Iron deficiency anemia      Ventral hernia, unspecified, without mention of obstruction or gangrene 7/2012     Costochondritis      Coronary artery disease      Gastro-oesophageal reflux disease      Mild persistent  asthma      Seasonal affective disorder (H)      Numbness of feet      Common migraine      ASCVD (arteriosclerotic cardiovascular disease)      Ischemic cardiomyopathy      Uterine leiomyoma      Menorrhagia      Hernia      Costochondritis      PONV (postoperative nausea and vomiting)      Shortness of breath      Difficulty in walking(719.7)      Unspecified cerebral artery occlusion with cerebral infarction      Numbness and tingling      Chronic pain      Arthritis      Stented coronary artery      4 STENTS- 11/1/11       CURRENT MEDICATIONS:  Current Outpatient Prescriptions   Medication Sig Dispense Refill     hydrALAZINE (APRESOLINE) 25 MG tablet SEE INCLUDED DIRECTIONS 90 tablet 3     lisinopril-hydrochlorothiazide (PRINZIDE/ZESTORETIC) 20-25 MG per tablet TAKE 1 TABLET BY MOUTH DAILY 90 tablet 3     blood glucose monitoring (NO BRAND SPECIFIED) meter device kit Use to test blood sugar 4 X times daily or as directed. 1 kit 0     blood glucose monitoring (NO BRAND SPECIFIED) test strip 1 strip by In Vitro route 4 times daily Test as directed. Please dispense three months and three months of refills. Thank you. 360 each 3     diphenhydrAMINE (BENADRYL) 25 MG capsule TK 1 TO 2 CS PO QHS  4     EPIPEN 2-RIKY 0.3 MG/0.3ML injection INJECT 0.3 MG INTO THE MUSCLE PRF ANAPHYALAXIS  0     AEROSPAN 80 MCG/ACT AERS INHALE 2 PUFFS PO BID.  RINSE MOUTH AFTERWARDS  4     fluticasone (FLONASE) 50 MCG/ACT spray U 2 SPRAYS IEN D.  3     folic acid (FOLVITE) 1 MG tablet   4     hydrALAZINE (APRESOLINE) 50 MG tablet TK 1 T PO D  1     Magnesium Oxide -Mg Supplement 250 MG TABS TK 1 T PO BID. REDUCE IF STOOLS LOOSEN  11     nystatin (MYCOSTATIN) 370874 UNITS TABS tablet TK 2 TS PO BID  0     albuterol (2.5 MG/3ML) 0.083% neb solution INL 1 VIAL VIA NEBULIZATION Q 4 TO 6 HOURS PRN  1     ASPIRIN LOW DOSE 81 MG EC tablet TK 1 T PO D  3     dicyclomine (BENTYL) 20 MG tablet Take 1 tablet (20 mg) by mouth 4 times daily as needed 60  tablet 5     sucralfate (CARAFATE) 1 GM tablet Take 1 tablet (1 g) by mouth 4 times daily as needed 120 tablet 3     azelastine (ASTELIN) 0.1 % spray Spray 2 sprays into both nostrils 2 times daily 1 Bottle 3     clopidogrel (PLAVIX) 75 MG tablet TAKE 1 TABLET BY MOUTH EVERY DAY 90 tablet 0     fluocinolone (SYNALAR) 0.01 % solution Apply topically daily as needed       mometasone (NASONEX) 50 MCG/ACT spray Spray 2 sprays into both nostrils daily       traMADol (ULTRAM) 50 MG tablet Take 1 tablet (50 mg) by mouth every 8 hours as needed for moderate pain 60 tablet 3     cyclobenzaprine (FLEXERIL) 10 MG tablet Take 1 tablet (10 mg) by mouth 2 times daily as needed for muscle spasms 180 tablet 0     Ondansetron HCl (ZOFRAN PO)        pravastatin (PRAVACHOL) 40 MG tablet Take 1 tablet (40 mg) by mouth daily 30 tablet 11     spironolactone (ALDACTONE) 25 MG tablet Take 2 tablets (50 mg) by mouth daily 60 tablet 11     metFORMIN (GLUCOPHAGE-XR) 500 MG 24 hr tablet Take 2 tablets (1,000 mg) by mouth daily (with dinner) 60 tablet 11     montelukast (SINGULAIR) 10 MG tablet Take 1 tablet (10 mg) by mouth At Bedtime 30 tablet 1     blood glucose monitoring (ACCU-CHEK MULTICLIX) lancets Use to test blood sugar four times daily or as directed. 2 Box 11     insulin glargine (LANTUS) 100 UNIT/ML PEN Inject 30 Units Subcutaneous daily (Patient taking differently: Inject 40 Units Subcutaneous daily ) 9 mL 11     ESOMEPRAZOLE MAGNESIUM PO Take 20 mg by mouth 2 times daily (before meals)       insulin pen needle 32G X 4 MM Use 1 daily as directed. 300 each 3     hydroxychloroquine (PLAQUENIL) 200 MG tablet Take 2 tablets (400 mg) by mouth daily (Patient taking differently: Take 200 mg by mouth daily ) 180 tablet 1     acetaminophen (TYLENOL) 325 MG tablet Take 1-2 tablets (325-650 mg) by mouth every 6 hours as needed for pain (headache) 250 tablet 0     ranitidine (ZANTAC) 150 MG tablet Take 1 tablet (150 mg) by mouth 2 times daily  60 tablet 2     vitamin D (ERGOCALCIFEROL) 72486 UNIT capsule Take 1 capsule (50,000 Units) by mouth every 7 days 12 capsule 0     metoprolol (TOPROL-XL) 100 MG 24 hr tablet Take 1 tablet (100 mg) by mouth daily 30 tablet 11     Ketoprofen POWD Apply 1 g topically 2 times daily as needed (Patient taking differently: Apply 1 g topically 2 times daily as needed CREAM) 100 g 5     lidocaine (XYLOCAINE) 5 % ointment Apply 1 g topically 2 times daily as needed for moderate pain 50 g 5     metroNIDAZOLE (NORITATE) 1 % cream Apply topically daily       desonide (DESOWEN) 0.05 % cream Apply topically 2 times daily       ketoconazole (NIZORAL) 2 % shampoo Apply topically daily as needed       fluocinonide (LIDEX) 0.05 % external solution Apply topically 2 times daily To areas of itch on the scalp as needed. 60 mL 11     ferrous gluconate (FERGON) 324 (38 FE) MG tablet Take 1 tablet (324 mg) by mouth 2 times daily 100 tablet PRN     sucralfate (CARAFATE) 1 GM tablet Take 1 tablet (1 g) by mouth 4 times daily as needed 40 tablet 1     nitroglycerin (NITROSTAT) 0.4 MG SL tablet Place 1 tablet (0.4 mg) under the tongue every 5 minutes as needed for chest pain 25 tablet 1     albuterol (PROAIR HFA, PROVENTIL HFA, VENTOLIN HFA) 108 (90 BASE) MCG/ACT inhaler Inhale 2 puffs into the lungs every 6 hours as needed for shortness of breath / dyspnea or wheezing 3 Inhaler 1     calcium carbonate (TUMS) 500 MG chewable tablet Take 3-4 chew tab by mouth daily as needed.       fexofenadine (ALLEGRA) 180 MG tablet Take 1 tablet by mouth daily as needed. 90 tablet 3     olopatadine (PATANOL) 0.1 % ophthalmic solution Place 1 drop into both eyes 2 times daily as needed for allergies. 5 mL 12       PAST SURGICAL HISTORY:  Past Surgical History   Procedure Laterality Date     C/section, low transverse            Leep tx, cervical       s/p LEEP     C echo heart xthoracic,complete, w/o doppler  04     [facility] WNL, EF  60%     Upper gi endoscopy  10/21/08     mild gastritis, Dr. Hidalgo     Hc ugi endoscopy w eus  08     Dr. Pastrana, possible chronic pancreatitis, fatty liver     Laparoscopic cholecystectomy  08     Dr. Ambrose     Hernia repair  2012     [place] [facility] [place]     Int uterine fibriod embolization  10/29/2014     Cardiac surgery       cardiac stent- recent in 16; 4 other stents     Orbitotomy Right 3/15/2016     Procedure: ORBITOTOMY;  Surgeon: Myron Cyr MD;  Location: [facility]     Dilation and curettage N/A 2016     Procedure: DILATION AND CURETTAGE;  Surgeon: Nahed Butler MD;  Location: [facility]     Insert intrauterine device N/A 2016     Procedure: INSERT INTRAUTERINE DEVICE;  Surgeon: Nahed Butler MD;  Location: [facility]       ALLERGIES     Allergies   Allergen Reactions     Amoxicillin-Pot Clavulanate      Augmentin      Unknown possible hives and edema     Azithromycin      Diatrizoate Other (See Comments)     Pt wants this listed because she is allergic to shellfish      Imitrex [Sumatriptan]      Severe face/neck/chest tightness and flushing side effects      Penicillins Hives     Unknown      Pork Allergy      Stomach pain, cramping, diarrhea, itching, nausea and headaches     Shellfish Allergy Hives and Swelling     Sulfa Drugs Hives and Swelling     Zithromax [Azithromycin Dihydrate] Swelling     Unknown        FAMILY HISTORY:  Family History   Problem Relation Age of Onset     HEART DISEASE Brother 15     MI at 15, 16.      HEART DISEASE Father 50     heart attack     CEREBROVASCULAR DISEASE Father      DIABETES Father      Hypertension Father      Depression Father      C.A.D. Father      DIABETES Maternal Uncle      Hypertension Mother      Arthritis Mother      HEART DISEASE Mother      long qt syndrome     Thyroid Disease Mother      C.A.D. Mother      Hypertension Maternal Aunt      Hypertension Maternal Uncle      Arthritis Brother      he passed away, had severe  arthritis at age 11     Arthritis Maternal Uncle      Eye Disorder Maternal Uncle      cataracts     Neurologic Disorder Sister      migraines     Neurologic Disorder Sister      migraines     Respiratory Son      asthma     CEREBROVASCULAR DISEASE Maternal Uncle      C.A.D. Brother      Family History Negative       neg for RA, SLE     Unknown/Adopted No family hx of      unknown neurological issues in her family, mother was adopted     Skin Cancer No family hx of      No known family hx of skin cancer       SOCIAL HISTORY:  Social History     Social History     Marital Status: Single     Spouse Name: N/A     Number of Children: 1     Years of Education: N/A     Occupational History      None      Social History Main Topics     Smoking status: Former Smoker -- 0.20 packs/day for 1 years     Types: Cigarettes     Quit date: 02/01/2016     Smokeless tobacco: Never Used     Alcohol Use: No     Drug Use: No     Sexual Activity: Not Currently     Other Topics Concern     Parent/Sibling W/ Cabg, Mi Or Angioplasty Before 65f 55m? Yes     Social History Narrative    Balanced Diet - sometimes    Osteoporosis Prevention Measures - Dairy servings per day: 2 servings weekly    Regular Exercise -  Yes Describe walking 4 times a week    Dental Exam - NO    Seatbelts used - Yes    Self Breast Exam - Yes    Abuse: Current or Past (Physical, Sexual or Emotional)- No    Do you have any concerns about STD's -  No    Do you feel safe in your environment - No    Guns stored in the home - No    Sunscreen used - Yes    Lipids -  YES - Date: 053102    Glucose -  YES - Date: 012804    Eye Exam - YES - Date: one year ago    Colon Cancer Screening - No    Hemoccults - NO    Pap Test -  YES - Date: 070904, remote history of LEEP    Mammography - YES - Date: last spring, would like to discuss, needs a referral [facility]    DEXA - NO    Immunizations reviewed and up to date - Yes, last td given in 1997 or 1998       ROS:  "  Constitutional: No fever, chills, or sweats. No weight gain/loss   ENT: No visual disturbance, ear ache, epistaxis, sore throat  Allergies/Immunologic: Negative.   Respiratory: No cough, hemoptysia  Cardiovascular: As per HPI  GI: No nausea, vomiting, hematemesis, melena, or hematochezia  : No urinary frequency, dysuria, or hematuria  Integument: Negative  Psychiatric: Negative  Neuro: Negative  Endocrinology: Negative   Musculoskeletal: Negative    EXAM:  /64 mmHg  Pulse 69  Ht 1.6 m (5' 3\")  Wt 78.926 kg (174 lb)  BMI 30.83 kg/m2  SpO2 99%  LMP  (LMP Unknown)  In general, the patient is a pleasant female in no apparent distress.    HEENT: NC/AT.  PERRLA.  EOMI.  Sclerae white, not injected.  Nares clear.  Pharynx without erythema or exudate.  Dentition intact.    Neck: No adenopathy.  No thyromegaly. Carotids +4/4 bilaterally without bruits.  No jugular venous distension.   Heart: RRR. Normal S1, S2 splits physiologically. No murmur, rub, click, or gallop. The PMI is in the 5th ICS in the midclavicular line. There is no heave.    Lungs: CTA.  No ronchi, wheezes, rales.  No dullness to percussion.   Abdomen: Soft, nontender, nondistended. No organomegaly.  No bruits.   Extremities: No clubbing, cyanosis, or edema.  The pulses are +4/4 at the radial, brachial, femoral, popliteal, DP, and PT sites bilaterally.  No bruits are noted.  Neurologic: Alert and oriented to person/place/time, normal speech, gait and affect  Skin: No petechiae, purpura or rash.    Labs:  LIPID RESULTS:  Lab Results   Component Value Date    CHOL 118 01/25/2017    HDL 35* 01/25/2017    LDL 58 01/25/2017    TRIG 125 01/25/2017    CHOLHDLRATIO 3.5 07/29/2015    NHDL 83 01/25/2017       LIVER ENZYME RESULTS:  Lab Results   Component Value Date    AST 19 01/25/2017    ALT 25 01/25/2017       CBC RESULTS:  Lab Results   Component Value Date    WBC 12.5* 01/25/2017    RBC 4.26 01/25/2017    HGB 11.1* 01/25/2017    HCT 33.7* " 01/25/2017    MCV 79 01/25/2017    MCH 26.1* 01/25/2017    MCHC 32.9 01/25/2017    RDW 13.4 01/25/2017     01/25/2017       BMP RESULTS:  Lab Results   Component Value Date     07/27/2016    POTASSIUM 4.1 07/27/2016    CHLORIDE 104 07/27/2016    CO2 26 07/27/2016    ANIONGAP 8 07/27/2016    GLC 87 07/27/2016    BUN 24 07/27/2016    CR 1.13* 12/26/2016    GFRESTIMATED 51* 12/26/2016    GFRESTBLACK 62 12/26/2016    KATHY 9.6 07/27/2016        A1C RESULTS:  Lab Results   Component Value Date    A1C 7.2* 12/26/2016       INR RESULTS:  Lab Results   Component Value Date    INR 0.97 08/25/2016    INR 0.98 05/20/2015           Assessment and Plan:     We discuss the results with  Patient:  We re-emphasize the importance of a heart healthy diet.  We discussed the importance of quit smoking.  We give her a referral to cardiac rehab.   Because of her low BP we change Hydralazine to 12.5 mg once daily. May take extra 12.5 mg once daily if SBP >140 mmHg.  Return to clinic in 6 months with CMP, Lipids and HgA1c.     Milan Peace MD, PhD  Professor of Medicine  Division of Cardiology    CC  Patient Care Team:  Kash Solano MD as PCP - General (Family Practice)  Milan Peace MD as MD (Cardiology)  Lauren Francis RN as Nurse Coordinator (Cardiology)  Majo Mckinnon MD as MD (Rheumatology)  Jolene Duenas MD as MD (Dermatology)  Manny Chowdhury MD as MD (Physical Medicine & Rehabilitation - Pain Medicine)  DARLING THOMPSON

## 2017-01-25 NOTE — MR AVS SNAPSHOT
After Visit Summary   1/25/2017    Janine Cornell    MRN: [id number]           Patient Information     Date Of Birth          1965        Visit Information        Provider Department      1/25/2017 2:30 PM Milan Peace MD [facility]        Today's Diagnoses     HTN (hypertension)    -  1     Coronary artery disease involving native heart, angina presence unspecified, unspecified vessel or lesion type         Hyperlipidemia LDL goal <70         Hyperlipidemia associated with type 2 diabetes mellitus (H)         CAD (coronary artery disease)           Care Instructions    Return to clinic in 6 months.  Fasting labs will be needed prior to this appointment.     Please do not hesitate to call if you have any cardiology related questions or concerns, or need to schedule an appointment, at [phone].         Cardiology Medication Refill Request Information:  * Please contact your pharmacy regarding ANY request for medication refills.  ** [facility] Prescription Fax = [phone]  * Please allow 3 business days for routine medication refills.    Cardiology Test Result notification information:  *You will be notified with in 7-10 days of your appointment day regarding the results of your test. If you are on MyChart you will be notified as soon as the provider has reviewed the results and signed off on them. Please call RN Care Coordinator with questions regarding results.            Follow-ups after your visit        Additional Services     CARDIAC REHAB REFERRAL       Your provider has referred you to: [facility]: [facility]) [facility] [phone]   [email or web address]                  Follow-up notes from your care team     Discussed this visit Return in about 6 months (around 7/25/2017) for Kobi, Cholesterol, HTN, CAD, FASTING lab work, Routine Visit.      Your next 10 appointments already scheduled     Feb 24, 2017   2:05 PM   (Arrive by 1:50 PM)   Return Visit with Kash Solano MD   [facility] ([facility])    [address]  4th Floor  [facility] [place] [facility] [id number] [phone]            Jun 28, 2017  4:00 PM   Lab with [facility]   [facility] ([facility])    [address]  1st Floor  [facility] [place] [facility] [id number] [phone]            Jun 28, 2017  4:30 PM   (Arrive by 4:15 PM)   Return Visit with Milan Peace MD   [facility] ([facility])    [address]  3rd Floor  [place] [id number] [phone]              Future tests that were ordered for you today     Open Future Orders        Priority Expected Expires Ordered    Comprehensive metabolic panel Routine 7/25/2017 5/25/2018 1/25/2017    Lipid panel reflex to direct LDL Routine 7/25/2017 5/25/2018 1/25/2017    Hemoglobin A1c Routine 7/25/2017 5/25/2018 1/25/2017    Vitamin D Deficiency Routine  1/25/2018 1/25/2017            Who to contact     If you have questions or need follow up information about today's clinic visit or your schedule please contact [facility] directly at [phone].  Normal or non-critical lab and imaging results will be communicated to you by afterBOThart, letter or phone within 4 business days after the clinic has received the results. If you do not hear from us within 7 days, please contact the clinic through afterBOThart or phone. If you have a critical or abnormal lab result, we will notify you by phone as soon as possible.  Submit refill requests through [employer] or call your pharmacy and they will forward the refill request to us. Please allow 3 business days for your refill to be completed.          Additional Information About Your Visit        [employer] Information     [employer] gives you secure access to your electronic health record. If you see a primary care provider, you can also send messages to your care  "team and make appointments. If you have questions, please call your primary care clinic.  If you do not have a primary care provider, please call [phone] and they will assist you.        Care EveryWhere ID     This is your Care EveryWhere ID. This could be used by other organizations to access your [facility] medical records  [id number]        Your Vitals Were     Pulse Height BMI (Body Mass Index) Pulse Oximetry Last Period       69 1.6 m (5' 3\") 30.83 kg/m2 99% (LMP Unknown)        Blood Pressure from Last 3 Encounters:   01/25/17 100/64   01/25/17 100/64   12/29/16 107/73    Weight from Last 3 Encounters:   01/25/17 78.926 kg (174 lb)   01/25/17 78.926 kg (174 lb)   12/29/16 78.2 kg (172 lb 6.4 oz)              We Performed the Following     CARDIAC REHAB REFERRAL          Today's Medication Changes          These changes are accurate as of: 1/25/17  5:02 PM.  If you have any questions, ask your nurse or doctor.               These medicines have changed or have updated prescriptions.        Dose/Directions    clopidogrel 75 MG tablet   Commonly known as:  PLAVIX   This may have changed:  See the new instructions.   Used for:  CAD (coronary artery disease)   Changed by:  Milan Peace MD        Dose:  75 mg   Take 1 tablet (75 mg) by mouth daily   Quantity:  90 tablet   Refills:  3       hydrALAZINE 25 MG tablet   Commonly known as:  APRESOLINE   This may have changed:  See the new instructions.   Used for:  HTN (hypertension)   Changed by:  Milan Peace MD        Dose:  12.5 mg   Take 0.5 tablets (12.5 mg) by mouth daily . May take additional one-half tablet once daily if systolic blood pressure >140 mmHg.   Quantity:  90 tablet   Refills:  3       hydroxychloroquine 200 MG tablet   Commonly known as:  PLAQUENIL   This may have changed:  how much to take   Used for:  Rheumatoid arthritis(714.0)        Dose:  400 mg   Take 2 tablets (400 mg) by mouth daily   Quantity:  180 tablet   Refills:  1       " insulin glargine 100 UNIT/ML injection   Commonly known as:  LANTUS   This may have changed:  how much to take   Used for:  Type 2 diabetes mellitus without complication (H)        Dose:  30 Units   Inject 30 Units Subcutaneous daily   Quantity:  9 mL   Refills:  11       Ketoprofen Powd   This may have changed:  additional instructions   Used for:  Fibromyalgia        Dose:  1 g   Apply 1 g topically 2 times daily as needed   Quantity:  100 g   Refills:  5       vitamin D 98748 UNIT capsule   Commonly known as:  ERGOCALCIFEROL   This may have changed:  additional instructions   Used for:  Vitamin D deficiency   Changed by:  Jose Luis Metcalf, [facility]        Dose:  11657 Units   Take 1 capsule (50,000 Units) by mouth every 7 days Need a Vitamin D level in 2 months.   Quantity:  8 capsule   Refills:  0            Where to get your medicines      These medications were sent to [facility] [address] [place] [address] AT [address] [place] [address] & [address] [address]     Phone:  [phone]    - clopidogrel 75 MG tablet  - hydrALAZINE 25 MG tablet  - metoprolol 100 MG 24 hr tablet  - vitamin D 39327 UNIT capsule             Primary Care Provider Office Phone # Fax #    Kash Solano [phone]       [facility] [address] [place] [address]        Thank you!     Thank you for choosing [facility]  for your care. Our goal is always to provide you with excellent care. Hearing back from our patients is one way we can continue to improve our services. Please take a few minutes to complete the written survey that you may receive in the mail after your visit with us. Thank you!             Your Updated Medication List - Protect others around you: Learn how to safely use, store and throw away your medicines at www.disposemymeds.org.          This list is accurate as of: 1/25/17  5:02 PM.  Always use your most recent med  list.                   Brand Name Dispense Instructions for use    acetaminophen 325 MG tablet    TYLENOL    250 tablet    Take 1-2 tablets (325-650 mg) by mouth every 6 hours as needed for pain (headache)       AEROSPAN 80 MCG/ACT Aers   Generic drug:  Flunisolide HFA      INHALE 2 PUFFS PO BID.  RINSE MOUTH AFTERWARDS       * albuterol 108 (90 BASE) MCG/ACT Inhaler    PROAIR HFA/PROVENTIL HFA/VENTOLIN HFA    3 Inhaler    Inhale 2 puffs into the lungs every 6 hours as needed for shortness of breath / dyspnea or wheezing       * albuterol (2.5 MG/3ML) 0.083% neb solution      INL 1 VIAL VIA NEBULIZATION Q 4 TO 6 HOURS PRN       ASPIRIN LOW DOSE 81 MG EC tablet   Generic drug:  aspirin      TK 1 T PO D       azelastine 0.1 % spray    ASTELIN    1 Bottle    Spray 2 sprays into both nostrils 2 times daily       blood glucose monitoring lancets     2 Box    Use to test blood sugar four times daily or as directed.       blood glucose monitoring meter device kit    no brand specified    1 kit    Use to test blood sugar 4 X times daily or as directed.       blood glucose monitoring test strip    no brand specified    360 each    1 strip by In Vitro route 4 times daily Test as directed. Please dispense three months and three months of refills. Thank you.       clopidogrel 75 MG tablet    PLAVIX    90 tablet    Take 1 tablet (75 mg) by mouth daily       cyclobenzaprine 10 MG tablet    FLEXERIL    180 tablet    Take 1 tablet (10 mg) by mouth 2 times daily as needed for muscle spasms       desonide 0.05 % cream    DESOWEN     Apply topically 2 times daily       dicyclomine 20 MG tablet    BENTYL    60 tablet    Take 1 tablet (20 mg) by mouth 4 times daily as needed       diphenhydrAMINE 25 MG capsule    BENADRYL     TK 1 TO 2 CS PO QHS       EPIPEN 2-RIKY 0.3 MG/0.3ML injection   Generic drug:  EPINEPHrine      INJECT 0.3 MG INTO THE MUSCLE PRF ANAPHYALAXIS       ESOMEPRAZOLE MAGNESIUM PO      Take 20 mg by mouth 2 times daily  (before meals)       ferrous gluconate 324 (38 FE) MG tablet    FERGON    100 tablet    Take 1 tablet (324 mg) by mouth 2 times daily       fexofenadine 180 MG tablet    ALLEGRA    90 tablet    Take 1 tablet by mouth daily as needed.       fluocinolone 0.01 % solution    SYNALAR     Apply topically daily as needed       fluocinonide 0.05 % solution    LIDEX    60 mL    Apply topically 2 times daily To areas of itch on the scalp as needed.       fluticasone 50 MCG/ACT spray    FLONASE     U 2 SPRAYS IEN D.       folic acid 1 MG tablet    FOLVITE         hydrALAZINE 25 MG tablet    APRESOLINE    90 tablet    Take 0.5 tablets (12.5 mg) by mouth daily . May take additional one-half tablet once daily if systolic blood pressure >140 mmHg.       hydroxychloroquine 200 MG tablet    PLAQUENIL    180 tablet    Take 2 tablets (400 mg) by mouth daily       insulin glargine 100 UNIT/ML injection    LANTUS    9 mL    Inject 30 Units Subcutaneous daily       insulin pen needle 32G X 4 MM     300 each    Use 1 daily as directed.       ketoconazole 2 % shampoo    NIZORAL     Apply topically daily as needed       Ketoprofen Powd     100 g    Apply 1 g topically 2 times daily as needed       lidocaine 5 % ointment    XYLOCAINE    50 g    Apply 1 g topically 2 times daily as needed for moderate pain       lisinopril-hydrochlorothiazide 20-25 MG per tablet    PRINZIDE/ZESTORETIC    90 tablet    TAKE 1 TABLET BY MOUTH DAILY       Magnesium Oxide -Mg Supplement 250 MG Tabs      TK 1 T PO BID. REDUCE IF STOOLS LOOSEN       metFORMIN 500 MG 24 hr tablet    GLUCOPHAGE-XR    60 tablet    Take 2 tablets (1,000 mg) by mouth daily (with dinner)       metoprolol 100 MG 24 hr tablet    TOPROL-XL    90 tablet    Take 1 tablet (100 mg) by mouth daily       metroNIDAZOLE 1 % cream    NORITATE     Apply topically daily       mometasone 50 MCG/ACT spray    NASONEX     Spray 2 sprays into both nostrils daily       montelukast 10 MG tablet    SINGULAIR     30 tablet    Take 1 tablet (10 mg) by mouth At Bedtime       nitroglycerin 0.4 MG sublingual tablet    NITROSTAT    25 tablet    Place 1 tablet (0.4 mg) under the tongue every 5 minutes as needed for chest pain       nystatin 118679 UNITS Tabs tablet    MYCOSTATIN     TK 2 TS PO BID       olopatadine 0.1 % ophthalmic solution    PATANOL    5 mL    Place 1 drop into both eyes 2 times daily as needed for allergies.       pravastatin 40 MG tablet    PRAVACHOL    30 tablet    Take 1 tablet (40 mg) by mouth daily       ranitidine 150 MG tablet    ZANTAC    60 tablet    Take 1 tablet (150 mg) by mouth 2 times daily       spironolactone 25 MG tablet    ALDACTONE    60 tablet    Take 2 tablets (50 mg) by mouth daily       * sucralfate 1 GM tablet    CARAFATE    40 tablet    Take 1 tablet (1 g) by mouth 4 times daily as needed       * sucralfate 1 GM tablet    CARAFATE    120 tablet    Take 1 tablet (1 g) by mouth 4 times daily as needed       traMADol 50 MG tablet    ULTRAM    60 tablet    Take 1 tablet (50 mg) by mouth every 8 hours as needed for moderate pain       TUMS 500 MG chewable tablet   Generic drug:  calcium carbonate      Take 3-4 chew tab by mouth daily as needed.       vitamin D 44839 UNIT capsule    ERGOCALCIFEROL    8 capsule    Take 1 capsule (50,000 Units) by mouth every 7 days Need a Vitamin D level in 2 months.       ZOFRAN PO          * Notice:  This list has 4 medication(s) that are the same as other medications prescribed for you. Read the directions carefully, and ask your doctor or other care provider to review them with you.

## 2017-01-25 NOTE — Clinical Note
Patient:  Janine Cornell  :   1965  MRN:     [id number]        Ms.Lisa CHELLE Cornell  [address] [facility] [address]        2017    Dear ,    We are writing to inform you of your test results. They are stable.      Resulted Orders   CBC with platelets differential   Result Value Ref Range    WBC 12.5 (H) 4.0 - 11.0 10e9/L    RBC Count 4.26 3.8 - 5.2 10e12/L    Hemoglobin 11.1 (L) 11.7 - 15.7 g/dL    Hematocrit 33.7 (L) 35.0 - 47.0 %    MCV 79 78 - 100 fl    MCH 26.1 (L) 26.5 - 33.0 pg    MCHC 32.9 31.5 - 36.5 g/dL    RDW 13.4 10.0 - 15.0 %    Platelet Count 328 150 - 450 10e9/L    Diff Method Automated Method     % Neutrophils 66.4 %    % Lymphocytes 23.8 %    % Monocytes 6.0 %    % Eosinophils 3.1 %    % Basophils 0.5 %    % Immature Granulocytes 0.2 %    Nucleated RBCs 0 0 /100    Absolute Neutrophil 8.3 1.6 - 8.3 10e9/L    Absolute Lymphocytes 3.0 0.8 - 5.3 10e9/L    Absolute Monocytes 0.8 0.0 - 1.3 10e9/L    Absolute Eosinophils 0.4 0.0 - 0.7 10e9/L    Absolute Basophils 0.1 0.0 - 0.2 10e9/L    Abs Immature Granulocytes 0.0 0 - 0.4 10e9/L    Absolute Nucleated RBC 0.0    CRP inflammation   Result Value Ref Range    CRP Inflammation <2.9 0.0 - 8.0 mg/L   Erythrocyte sedimentation rate auto   Result Value Ref Range    Sed Rate 23 0 - 30 mm/h   Routine UA with Micro Reflex to Culture   Result Value Ref Range    Color Urine Yellow     Appearance Urine Clear     Glucose Urine Negative NEG mg/dL    Bilirubin Urine Negative NEG    Ketones Urine Negative NEG mg/dL    Specific Gravity Urine 1.010 1.003 - 1.035    Blood Urine Negative NEG    pH Urine 6.0 5.0 - 7.0 pH    Protein Albumin Urine Negative NEG mg/dL    Urobilinogen mg/dL 0.0 0.0 - 2.0 mg/dL    Nitrite Urine Negative NEG    Leukocyte Esterase Urine Negative NEG    Source Midstream Urine     WBC Urine 3 (H) 0 - 2 /HPF    RBC Urine 1 0 - 2 /HPF    Bacteria Urine Few (A) NEG /HPF    Squamous Epithelial /HPF Urine 2 (H) 0 - 1  /HPF    Mucous Urine Present (A) NEG /LPF   Protein  random urine   Result Value Ref Range    Protein Random Urine 0.08 g/L    Protein Total Urine g/gr Creatinine 0.12 0 - 0.2 g/g Cr   Vitamin D Deficiency   Result Value Ref Range    Vitamin D Deficiency screening 32 20 - 75 ug/L      Comment:      Season, race, dietary intake, and treatment affect the concentration of   25-hydroxy-Vitamin D. Values may decrease during winter months and increase   during summer months. Values 20-29 ug/L may indicate Vitamin D insufficiency   and values <20 ug/L may indicate Vitamin D deficiency.   Vitamin D determination is routinely performed by an immunoassay specific for   25 hydroxyvitamin D3.  If an individual is on vitamin D2 (ergocalciferol)   supplementation, please specify 25 OH vitamin D2 and D3 level determination   by   LCMSMS test VITD23.     Creatinine urine calculation only   Result Value Ref Range    Creatinine Urine 66 mg/dL       Majo Mckinnon MD

## 2017-01-25 NOTE — Clinical Note
Dr. Peace... 1. I am having patient hold her Hydralazine. She is confused about the prn directions.

## 2017-01-25 NOTE — NURSING NOTE
Chief Complaint   Patient presents with     Follow Up For     12 month RTC for CAD, HTN, hyperlipidemia

## 2017-01-26 LAB — DEPRECATED CALCIDIOL+CALCIFEROL SERPL-MC: 32 UG/L (ref 20–75)

## 2017-01-27 NOTE — PROGRESS NOTES
----- Message -----      From: Jose Luis Metcalf [facility]      Sent: 1/27/2017   7:54 AM        To: Kash Solano MD     [facility], I'll start it for her next visit. Thanks!   ----- Message -----      From: Kash Solano MD      Sent: 1/25/2017   4:48 PM        To: Jose Luis Metcalf [facility]     Ok voltaren gel   Can you put that through?

## 2017-01-31 DIAGNOSIS — D50.9 IDA (IRON DEFICIENCY ANEMIA): Primary | ICD-10-CM

## 2017-02-01 DIAGNOSIS — E11.9 TYPE 2 DIABETES, HBA1C GOAL < 7% (H): Primary | ICD-10-CM

## 2017-02-01 DIAGNOSIS — K29.70 GASTRITIS: Primary | ICD-10-CM

## 2017-02-01 DIAGNOSIS — I42.9 CARDIOMYOPATHY, UNSPECIFIED (H): Primary | ICD-10-CM

## 2017-02-01 RX ORDER — ASPIRIN 81 MG
TABLET, DELAYED RELEASE (ENTERIC COATED) ORAL
Qty: 90 TABLET | Refills: 3 | Status: ON HOLD
Start: 2017-02-01 | End: 2017-11-17

## 2017-02-01 RX ORDER — FERROUS GLUCONATE 324(38)MG
1 TABLET ORAL 2 TIMES DAILY
Qty: 180 TABLET | Refills: 1 | Status: SHIPPED | OUTPATIENT
Start: 2017-02-01 | End: 2018-04-30

## 2017-02-01 NOTE — TELEPHONE ENCOUNTER
ferrous gluconate (FERGON) 324MG      Last Written Prescription Date:  10/19/15  Last Fill Quantity: 100,   # refills: 0  Last Office Visit : 1/25/17  Future Office visit:  2/24/17    Routing refill request to provider for review/approval because: NEEDS  PCP AUTH.      FILLED 10/19/15 [facility]

## 2017-02-02 ENCOUNTER — TELEPHONE (OUTPATIENT)
Dept: INTERNAL MEDICINE | Facility: CLINIC | Age: 52
End: 2017-02-02

## 2017-02-02 DIAGNOSIS — E11.9 TYPE 2 DIABETES MELLITUS WITHOUT COMPLICATION (H): Primary | ICD-10-CM

## 2017-02-02 NOTE — TELEPHONE ENCOUNTER
Jose Luis I believe she uses 40 units Lantus a day, is it one unit to one unit equivalent to switch to Basaglar?

## 2017-02-02 NOTE — TELEPHONE ENCOUNTER
ranitidine (ZANTAC) 150 MG      Last Written Prescription Date:  5/25/16  Last Fill Quantity: 60,   # refills: 2  Last Office Visit : 1/25/17  Future Office visit:  2/24/17

## 2017-02-02 NOTE — TELEPHONE ENCOUNTER
According to the pharmacy, Lantus is not covered by the insurance and the alternative is Basaglar.  Rx pended for your review.  Thanks.    Katy

## 2017-02-03 RX ORDER — INSULIN GLARGINE 100 [IU]/ML
40 INJECTION, SOLUTION SUBCUTANEOUS DAILY
Qty: 9 ML | Refills: 3 | Status: SHIPPED | OUTPATIENT
Start: 2017-02-03 | End: 2017-05-31

## 2017-02-24 ENCOUNTER — OFFICE VISIT (OUTPATIENT)
Dept: FAMILY MEDICINE | Facility: CLINIC | Age: 52
End: 2017-02-24

## 2017-02-24 VITALS — SYSTOLIC BLOOD PRESSURE: 126 MMHG | OXYGEN SATURATION: 99 % | DIASTOLIC BLOOD PRESSURE: 73 MMHG | HEART RATE: 69 BPM

## 2017-02-24 DIAGNOSIS — F43.9 SITUATIONAL STRESS: Primary | ICD-10-CM

## 2017-02-24 ASSESSMENT — PAIN SCALES - GENERAL: PAINLEVEL: MODERATE PAIN (5)

## 2017-02-24 NOTE — MR AVS SNAPSHOT
After Visit Summary   2/24/2017    Janine Cornell    MRN: [id number]           Patient Information     Date Of Birth          1965        Visit Information        Provider Department      2/24/2017 2:05 PM Kash Solano MD [facility]        Care Instructions    Primary Care Center Medication Refill Request Information:  * Please contact your pharmacy regarding ANY request for medication refills.  ** [facility] Prescription Fax = [phone]  * Please allow 3 business days for routine medication refills.  * Please allow 5 business days for controlled substance medication refills.     Primary Care Center Test Result notification information:  *You will be notified with in 7-10 days of your appointment day regarding the results of your test.  If you are on MyChart you will be notified as soon as the provider has reviewed the results and signed off on them.    Primary Care Center Phone Number [phone]          Follow-ups after your visit        Follow-up notes from your care team     Return in about 4 weeks (around 3/24/2017).      Your next 10 appointments already scheduled     Mar 17, 2017  3:05 PM CDT   (Arrive by 2:50 PM)   Return Visit with Kash Solano MD   [facility] ([facility])    [address] [place] [address]  4th Floor  [facility] [address] [phone]            Jun 28, 2017  4:00 PM CDT   Lab with  LAB   [facility] ([facility])    [address] [place] [address]  1st [facility] [id number] [phone]            Jun 28, 2017  4:30 PM CDT   (Arrive by 4:15 PM)   Return Visit with Milan Peace MD   [facility] ([facility])    [address] [place] [address]  3rd [facility] [id number] [phone]              Who to contact     Please call your clinic at [phone] to:    Ask questions about your health    Make or cancel appointments    Discuss your  medicines    Learn about your test results    Speak to your doctor   If you have compliments or concerns about an experience at your clinic, or if you wish to file a complaint, please contact [facility] Patient Relations at [phone] or email us at [email or web address]         Additional Information About Your Visit        [employer] Information     [employer] gives you secure access to your electronic health record. If you see a primary care provider, you can also send messages to your care team and make appointments. If you have questions, please call your primary care clinic.  If you do not have a primary care provider, please call [phone] and they will assist you.      [employer] is an electronic gateway that provides easy, online access to your medical records. With [employer], you can request a clinic appointment, read your test results, renew a prescription or communicate with your care team.     To access your existing account, please contact your [facility] or call [phone] for assistance.        Care EveryWhere ID     This is your Care EveryWhere ID. This could be used by other organizations to access your [place] medical records  [id number]        Your Vitals Were     Pulse Pulse Oximetry Breastfeeding?             69 99% No          Blood Pressure from Last 3 Encounters:   02/24/17 126/73   01/25/17 100/64   01/25/17 100/64    Weight from Last 3 Encounters:   01/25/17 78.9 kg (174 lb)   01/25/17 78.9 kg (174 lb)   12/29/16 78.2 kg (172 lb 6.4 oz)              Today, you had the following     No orders found for display         Today's Medication Changes          These changes are accurate as of: 2/24/17  4:28 PM.  If you have any questions, ask your nurse or doctor.               These medicines have changed or have updated prescriptions.        Dose/Directions    hydroxychloroquine 200 MG tablet   Commonly known as:  PLAQUENIL    This may have changed:  how much to take   Used for:  Rheumatoid arthritis(714.0)        Dose:  400 mg   Take 2 tablets (400 mg) by mouth daily   Quantity:  180 tablet   Refills:  1       Ketoprofen Powd   This may have changed:  additional instructions   Used for:  Fibromyalgia        Dose:  1 g   Apply 1 g topically 2 times daily as needed   Quantity:  100 g   Refills:  5       vitamin D 61092 UNIT capsule   Commonly known as:  ERGOCALCIFEROL   This may have changed:  additional instructions   Used for:  Vitamin D deficiency        Dose:  47415 Units   Take 1 capsule (50,000 Units) by mouth every 7 days Need a Vitamin D level in 2 months.   Quantity:  8 capsule   Refills:  0                Primary Care Provider Office Phone # Fax #    Kash Solano [phone]       [facility] [address] [place] [id number]        Thank you!     Thank you for choosing [facility]  for your care. Our goal is always to provide you with excellent care. Hearing back from our patients is one way we can continue to improve our services. Please take a few minutes to complete the written survey that you may receive in the mail after your visit with us. Thank you!             Your Updated Medication List - Protect others around you: Learn how to safely use, store and throw away your medicines at www.disposemymeds.org.          This list is accurate as of: 2/24/17  4:28 PM.  Always use your most recent med list.                   Brand Name Dispense Instructions for use    acetaminophen 325 MG tablet    TYLENOL    250 tablet    Take 1-2 tablets (325-650 mg) by mouth every 6 hours as needed for pain (headache)       AEROSPAN 80 MCG/ACT Aers   Generic drug:  Flunisolide HFA      INHALE 2 PUFFS PO BID.  RINSE MOUTH AFTERWARDS       * albuterol 108 (90 BASE) MCG/ACT Inhaler    PROAIR HFA/PROVENTIL HFA/VENTOLIN HFA    3 Inhaler    Inhale 2 puffs into the lungs every 6 hours as  needed for shortness of breath / dyspnea or wheezing       * albuterol (2.5 MG/3ML) 0.083% neb solution      INL 1 VIAL VIA NEBULIZATION Q 4 TO 6 HOURS PRN       * ASPIRIN LOW DOSE 81 MG EC tablet   Generic drug:  aspirin      TK 1 T PO D       * ASPIRIN LOW DOSE 81 MG EC tablet   Generic drug:  aspirin     90 tablet    TAKE 1 TABLET BY MOUTH EVERY DAY       azelastine 0.1 % spray    ASTELIN    1 Bottle    Spray 2 sprays into both nostrils 2 times daily       blood glucose monitoring lancets     4 Box    Use to test blood sugars 4 times daily or as directed.       blood glucose monitoring meter device kit    no brand specified    1 kit    Use to test blood sugar 4 X times daily or as directed.       blood glucose monitoring test strip    no brand specified    360 each    1 strip by In Vitro route 4 times daily Test as directed. Please dispense three months and three months of refills. Thank you.       clopidogrel 75 MG tablet    PLAVIX    90 tablet    Take 1 tablet (75 mg) by mouth daily       cyclobenzaprine 10 MG tablet    FLEXERIL    180 tablet    Take 1 tablet (10 mg) by mouth 2 times daily as needed for muscle spasms       desonide 0.05 % cream    DESOWEN     Apply topically 2 times daily       dicyclomine 20 MG tablet    BENTYL    60 tablet    Take 1 tablet (20 mg) by mouth 4 times daily as needed       diphenhydrAMINE 25 MG capsule    BENADRYL     TK 1 TO 2 CS PO QHS       EPIPEN 2-RIKY 0.3 MG/0.3ML injection   Generic drug:  EPINEPHrine      INJECT 0.3 MG INTO THE MUSCLE PRF ANAPHYALAXIS       ESOMEPRAZOLE MAGNESIUM PO      Take 20 mg by mouth 2 times daily (before meals)       ferrous gluconate 324 (38 FE) MG tablet    FERGON    180 tablet    Take 1 tablet (324 mg) by mouth 2 times daily       fexofenadine 180 MG tablet    ALLEGRA    90 tablet    Take 1 tablet by mouth daily as needed.       fluocinolone 0.01 % solution    SYNALAR     Apply topically daily as needed       fluocinonide 0.05 % solution     LIDEX    60 mL    Apply topically 2 times daily To areas of itch on the scalp as needed.       fluticasone 50 MCG/ACT spray    FLONASE     U 2 SPRAYS IEN D.       folic acid 1 MG tablet    FOLVITE         hydrALAZINE 25 MG tablet    APRESOLINE    90 tablet    Take 0.5 tablets (12.5 mg) by mouth daily . May take additional one-half tablet once daily if systolic blood pressure >140 mmHg.       hydroxychloroquine 200 MG tablet    PLAQUENIL    180 tablet    Take 2 tablets (400 mg) by mouth daily       insulin glargine 100 UNIT/ML injection     9 mL    Inject 40 Units Subcutaneous daily       insulin pen needle 32G X 4 MM     300 each    Use 1 daily as directed.       ketoconazole 2 % shampoo    NIZORAL     Apply topically daily as needed       Ketoprofen Powd     100 g    Apply 1 g topically 2 times daily as needed       lidocaine 5 % ointment    XYLOCAINE    50 g    Apply 1 g topically 2 times daily as needed for moderate pain       lisinopril-hydrochlorothiazide 20-25 MG per tablet    PRINZIDE/ZESTORETIC    90 tablet    TAKE 1 TABLET BY MOUTH DAILY       Magnesium Oxide -Mg Supplement 250 MG Tabs      TK 1 T PO BID. REDUCE IF STOOLS LOOSEN       metFORMIN 500 MG 24 hr tablet    GLUCOPHAGE-XR    60 tablet    Take 2 tablets (1,000 mg) by mouth daily (with dinner)       metoprolol 100 MG 24 hr tablet    TOPROL-XL    90 tablet    Take 1 tablet (100 mg) by mouth daily       metroNIDAZOLE 1 % cream    NORITATE     Apply topically daily       mometasone 50 MCG/ACT spray    NASONEX     Spray 2 sprays into both nostrils daily       montelukast 10 MG tablet    SINGULAIR    30 tablet    Take 1 tablet (10 mg) by mouth At Bedtime       nitroglycerin 0.4 MG sublingual tablet    NITROSTAT    25 tablet    Place 1 tablet (0.4 mg) under the tongue every 5 minutes as needed for chest pain       nystatin 397788 UNITS Tabs tablet    MYCOSTATIN     TK 2 TS PO BID       olopatadine 0.1 % ophthalmic solution    PATANOL    5 mL    Place 1  drop into both eyes 2 times daily as needed for allergies.       pravastatin 40 MG tablet    PRAVACHOL    30 tablet    Take 1 tablet (40 mg) by mouth daily       ranitidine 150 MG tablet    ZANTAC    60 tablet    Take 1 tablet (150 mg) by mouth 2 times daily       spironolactone 25 MG tablet    ALDACTONE    60 tablet    Take 2 tablets (50 mg) by mouth daily       * sucralfate 1 GM tablet    CARAFATE    40 tablet    Take 1 tablet (1 g) by mouth 4 times daily as needed       * sucralfate 1 GM tablet    CARAFATE    120 tablet    Take 1 tablet (1 g) by mouth 4 times daily as needed       traMADol 50 MG tablet    ULTRAM    60 tablet    Take 1 tablet (50 mg) by mouth every 8 hours as needed for moderate pain       TUMS 500 MG chewable tablet   Generic drug:  calcium carbonate      Take 3-4 chew tab by mouth daily as needed.       vitamin D 22978 UNIT capsule    ERGOCALCIFEROL    8 capsule    Take 1 capsule (50,000 Units) by mouth every 7 days Need a Vitamin D level in 2 months.       ZOFRAN PO          * Notice:  This list has 6 medication(s) that are the same as other medications prescribed for you. Read the directions carefully, and ask your doctor or other care provider to review them with you.

## 2017-02-24 NOTE — NURSING NOTE
Chief Complaint   Patient presents with     Medication Follow-up     Patient is here to follow up on medication.      Radha Baker LPN at 3:08 PM on 2/24/2017.

## 2017-02-24 NOTE — PATIENT INSTRUCTIONS
Primary Care Center Medication Refill Request Information:  * Please contact your pharmacy regarding ANY request for medication refills.  ** [facility] Prescription Fax = [phone]  * Please allow 3 business days for routine medication refills.  * Please allow 5 business days for controlled substance medication refills.     Primary [facility] Test Result notification information:  *You will be notified with in 7-10 days of your appointment day regarding the results of your test.  If you are on MyChart you will be notified as soon as the provider has reviewed the results and signed off on them.    Primary Care Center Phone Number [phone]

## 2017-02-25 NOTE — PROGRESS NOTES
SUBJECTIVE:    Pt is a 51 year old female with pmh of     Patient Active Problem List   Diagnosis     Seasonal affective disorder (H)     Allergic rhinitis     PCOS (polycystic ovarian syndrome)     Moderate persistent asthma     Chronic pancreatitis (H)     Hypertension goal BP (blood pressure) < 140/90     Common migraine     NSTEMI (non-ST elevated myocardial infarction) (H)     Hyperlipidemia LDL goal <70     ASCVD (arteriosclerotic cardiovascular disease)     GERD (gastroesophageal reflux disease)     Ischemic cardiomyopathy     Hypertensive heart disease     Uterine leiomyoma     Iron deficiency anemia     Costochondritis     Vitamin D deficiency     Breast cancer screening     Spinal stenosis in cervical region     Fibromyalgia     Seronegative rheumatoid arthritis (H)     Type 2 diabetes, HbA1C goal < 8% (H)     Type 2 diabetes mellitus with other specified complication (H)     Hyperlipidemia associated with type 2 diabetes mellitus (H)     Hypertension associated with diabetes (H)     Overweight with body mass index (BMI) 25.0-29.9     Tobacco use disorder     Telogen effluvium     CAD S/P percutaneous coronary angioplasty     Status post coronary angiogram       who is here for evaluation of had concerns including Medication Follow-up.    Here for a few things.  One, intense stress--she lives w/ mom who is mentally ill, has taken care of her for many years, needs to move out as their relationship is so toxic, not sure of how to get/afford housing--wants to talk to SW who does so today; also lives w/ adult son who had ADD and is hard for her to get along with.    Two, she switched insulin for insurance reasons, had been wanting to stay w/ [facility] so unhappy about that    Three, is still at [facility] getting acupuncture which helps HA and pain, also vit D from them      Current Outpatient Prescriptions   Medication Sig Dispense Refill     insulin glargine (BASAGLAR KWIKPEN) 100 UNIT/ML injection Inject 40  Units Subcutaneous daily 9 mL 3     ranitidine (ZANTAC) 150 MG tablet Take 1 tablet (150 mg) by mouth 2 times daily 60 tablet 2     ferrous gluconate (FERGON) 324 (38 FE) MG tablet Take 1 tablet (324 mg) by mouth 2 times daily 180 tablet 1     ASPIRIN LOW DOSE 81 MG EC tablet TAKE 1 TABLET BY MOUTH EVERY DAY 90 tablet 3     blood glucose monitoring (ONE TOUCH DELICA) lancets Use to test blood sugars 4 times daily or as directed. 4 Box 3     vitamin D (ERGOCALCIFEROL) 51471 UNIT capsule Take 1 capsule (50,000 Units) by mouth every 7 days Need a Vitamin D level in 2 months. (Patient taking differently: Take 50,000 Units by mouth every 7 days Need a Vitamin D level in 2 months.) 8 capsule 0     hydrALAZINE (APRESOLINE) 25 MG tablet Take 0.5 tablets (12.5 mg) by mouth daily . May take additional one-half tablet once daily if systolic blood pressure >140 mmHg. 90 tablet 3     metoprolol (TOPROL-XL) 100 MG 24 hr tablet Take 1 tablet (100 mg) by mouth daily 90 tablet 3     clopidogrel (PLAVIX) 75 MG tablet Take 1 tablet (75 mg) by mouth daily 90 tablet 3     lisinopril-hydrochlorothiazide (PRINZIDE/ZESTORETIC) 20-25 MG per tablet TAKE 1 TABLET BY MOUTH DAILY 90 tablet 3     blood glucose monitoring (NO BRAND SPECIFIED) meter device kit Use to test blood sugar 4 X times daily or as directed. 1 kit 0     blood glucose monitoring (NO BRAND SPECIFIED) test strip 1 strip by In Vitro route 4 times daily Test as directed. Please dispense three months and three months of refills. Thank you. 360 each 3     diphenhydrAMINE (BENADRYL) 25 MG capsule TK 1 TO 2 CS PO QHS  4     EPIPEN 2-RIKY 0.3 MG/0.3ML injection INJECT 0.3 MG INTO THE MUSCLE PRF ANAPHYALAXIS  0     AEROSPAN 80 MCG/ACT AERS INHALE 2 PUFFS PO BID.  RINSE MOUTH AFTERWARDS  4     fluticasone (FLONASE) 50 MCG/ACT spray U 2 SPRAYS IEN D.  3     folic acid (FOLVITE) 1 MG tablet   4     Magnesium Oxide -Mg Supplement 250 MG TABS TK 1 T PO BID. REDUCE IF STOOLS LOOSEN  11      nystatin (MYCOSTATIN) 240422 UNITS TABS tablet TK 2 TS PO BID  0     albuterol (2.5 MG/3ML) 0.083% neb solution INL 1 VIAL VIA NEBULIZATION Q 4 TO 6 HOURS PRN  1     ASPIRIN LOW DOSE 81 MG EC tablet TK 1 T PO D  3     dicyclomine (BENTYL) 20 MG tablet Take 1 tablet (20 mg) by mouth 4 times daily as needed 60 tablet 5     sucralfate (CARAFATE) 1 GM tablet Take 1 tablet (1 g) by mouth 4 times daily as needed 120 tablet 3     azelastine (ASTELIN) 0.1 % spray Spray 2 sprays into both nostrils 2 times daily 1 Bottle 3     fluocinolone (SYNALAR) 0.01 % solution Apply topically daily as needed       mometasone (NASONEX) 50 MCG/ACT spray Spray 2 sprays into both nostrils daily       traMADol (ULTRAM) 50 MG tablet Take 1 tablet (50 mg) by mouth every 8 hours as needed for moderate pain 60 tablet 3     cyclobenzaprine (FLEXERIL) 10 MG tablet Take 1 tablet (10 mg) by mouth 2 times daily as needed for muscle spasms 180 tablet 0     Ondansetron HCl (ZOFRAN PO)        pravastatin (PRAVACHOL) 40 MG tablet Take 1 tablet (40 mg) by mouth daily 30 tablet 11     spironolactone (ALDACTONE) 25 MG tablet Take 2 tablets (50 mg) by mouth daily 60 tablet 11     metFORMIN (GLUCOPHAGE-XR) 500 MG 24 hr tablet Take 2 tablets (1,000 mg) by mouth daily (with dinner) 60 tablet 11     montelukast (SINGULAIR) 10 MG tablet Take 1 tablet (10 mg) by mouth At Bedtime 30 tablet 1     ESOMEPRAZOLE MAGNESIUM PO Take 20 mg by mouth 2 times daily (before meals)       insulin pen needle 32G X 4 MM Use 1 daily as directed. 300 each 3     hydroxychloroquine (PLAQUENIL) 200 MG tablet Take 2 tablets (400 mg) by mouth daily (Patient taking differently: Take 200 mg by mouth daily ) 180 tablet 1     acetaminophen (TYLENOL) 325 MG tablet Take 1-2 tablets (325-650 mg) by mouth every 6 hours as needed for pain (headache) 250 tablet 0     Ketoprofen POWD Apply 1 g topically 2 times daily as needed (Patient taking differently: Apply 1 g topically 2 times daily as needed  CREAM) 100 g 5     lidocaine (XYLOCAINE) 5 % ointment Apply 1 g topically 2 times daily as needed for moderate pain 50 g 5     metroNIDAZOLE (NORITATE) 1 % cream Apply topically daily       desonide (DESOWEN) 0.05 % cream Apply topically 2 times daily       ketoconazole (NIZORAL) 2 % shampoo Apply topically daily as needed       fluocinonide (LIDEX) 0.05 % external solution Apply topically 2 times daily To areas of itch on the scalp as needed. 60 mL 11     sucralfate (CARAFATE) 1 GM tablet Take 1 tablet (1 g) by mouth 4 times daily as needed 40 tablet 1     nitroglycerin (NITROSTAT) 0.4 MG SL tablet Place 1 tablet (0.4 mg) under the tongue every 5 minutes as needed for chest pain 25 tablet 1     albuterol (PROAIR HFA, PROVENTIL HFA, VENTOLIN HFA) 108 (90 BASE) MCG/ACT inhaler Inhale 2 puffs into the lungs every 6 hours as needed for shortness of breath / dyspnea or wheezing 3 Inhaler 1     calcium carbonate (TUMS) 500 MG chewable tablet Take 3-4 chew tab by mouth daily as needed.       fexofenadine (ALLEGRA) 180 MG tablet Take 1 tablet by mouth daily as needed. 90 tablet 3     olopatadine (PATANOL) 0.1 % ophthalmic solution Place 1 drop into both eyes 2 times daily as needed for allergies. 5 mL 12       Social History   Substance Use Topics     Smoking status: Former Smoker     Packs/day: 0.20     Years: 1.00     Types: Cigarettes     Quit date: 2/1/2016     Smokeless tobacco: Never Used     Alcohol use No       Allergies   Allergen Reactions     Amoxicillin-Pot Clavulanate      Augmentin      Unknown possible hives and edema     Azithromycin      Diatrizoate Other (See Comments)     Pt wants this listed because she is allergic to shellfish      Imitrex [Sumatriptan]      Severe face/neck/chest tightness and flushing side effects      Penicillins Hives     Unknown      Pork Allergy      Stomach pain, cramping, diarrhea, itching, nausea and headaches     Shellfish Allergy Hives and Swelling     Sulfa Drugs Hives and  Swelling     Zithromax [Azithromycin Dihydrate] Swelling     Unknown          OBJECTIVE:  /73  Pulse 69  SpO2 99%  Breastfeeding? No  GENERAL APPEARANCE: Alert, no acute distress  NEURO: Alert, oriented, speech and mentation normal  PSYCHE: mentation appears normal, affect and mood normal    ASSESSMENT/PLAN:    Situational stress: she meets w/ SW same day--20 min visit all couns/coord care about this, discussed w/ David Roberts too, SW to determine if should see both.  See me in a mo  No new orders placed    WENDY HARRISON MD

## 2017-02-28 DIAGNOSIS — M19.90 INFLAMMATORY ARTHRITIS: Primary | ICD-10-CM

## 2017-02-28 RX ORDER — HYDROXYCHLOROQUINE SULFATE 200 MG/1
400 TABLET, FILM COATED ORAL DAILY
Qty: 180 TABLET | Refills: 0 | Status: SHIPPED | OUTPATIENT
Start: 2017-02-28 | End: 2017-10-24

## 2017-02-28 NOTE — TELEPHONE ENCOUNTER
PLAQUENIL 200 MG      Last Written Prescription Date:  5/25/16  Last Fill Quantity: 180 # refills: 1  Last Office Visit : 11/18/16  Future Office visit:  NONE  EYE EXAM  NONE    Routing refill request to provider for review/approval because:   NO EYE EXAM  REMINDER LETTER TO PT + 90 DAY RF

## 2017-02-28 NOTE — LETTER
Sulaiman Mehta,    Regular eye exams are required while taking Hydroxychloroquine (Plaquenil). These may be yearly or as determined by your eye specialist.    Although vision problems and loss of sight while taking hydroxchloroquine are very rare, notify your doctor if you notice changes in your vision. Visual changes experienced early on the medication or seen early during regular eye exams usually improve after stopping the medication.     Eye exams should be completed by an ophthalmologist who is experienced in monitoring for Hydroxchloroquine toxicity.    Exam may include visual field testing and slit lamp exam or other testing as determined by the ophthalmologist.     We received a refill request from your pharmacy.  Your Hydroxychloroquine prescription has been refilled for 90 DAYS . Please request your eye clinic to fax or mail a copy of your eye exam report to our clinic indicating that testing was completed for toxicity screening.        Sincerely,  Rheumatology Staff

## 2017-03-22 ENCOUNTER — OFFICE VISIT (OUTPATIENT)
Dept: FAMILY MEDICINE | Facility: CLINIC | Age: 52
End: 2017-03-22

## 2017-03-22 VITALS
BODY MASS INDEX: 30.29 KG/M2 | SYSTOLIC BLOOD PRESSURE: 117 MMHG | OXYGEN SATURATION: 100 % | DIASTOLIC BLOOD PRESSURE: 75 MMHG | WEIGHT: 171 LBS | HEART RATE: 71 BPM

## 2017-03-22 DIAGNOSIS — R23.3 EASY BRUISING: Primary | ICD-10-CM

## 2017-03-22 RX ORDER — FLUCONAZOLE 100 MG/1
100 TABLET ORAL DAILY PRN
COMMUNITY
End: 2017-07-25

## 2017-03-22 ASSESSMENT — PAIN SCALES - GENERAL: PAINLEVEL: MODERATE PAIN (5)

## 2017-03-22 NOTE — NURSING NOTE
Chief Complaint   Patient presents with     Results     Pt is here to follow up on results.      Shyanne Stallworth LPN March 22, 2017 3:06 PM

## 2017-03-22 NOTE — MR AVS SNAPSHOT
After Visit Summary   3/22/2017    Janine Cornell    MRN: [id number]           Patient Information     Date Of Birth          1965        Visit Information        Provider Department      3/22/2017 3:05 PM Kash Solano MD [facility]        Today's Diagnoses     Easy bruising    -  1      Care Instructions    Primary Care Center Medication Refill Request Information:  * Please contact your pharmacy regarding ANY request for medication refills.  ** [facility] Prescription Fax = [phone]  * Please allow 3 business days for routine medication refills.  * Please allow 5 business days for controlled substance medication refills.     Primary Care Center Test Result notification information:  *You will be notified within 7-10 days of your appointment day regarding the results of your test.  If you are on MyChart you will be notified as soon as the provider has reviewed the results and signed off on them.          Follow-ups after your visit        Additional Services     ONC/HEME ADULT REFERRAL       Your provider has referred you to: [facility]: [facility] [phone]   [email or web address]    Please be aware that coverage of these services is subject to the terms and limitations of your health insurance plan.  Call member services at your health plan with any benefit or coverage questions.      Please bring the following with you to your appointment:    (1) Any X-Rays, CTs or MRIs which have been performed.  Contact the facility where they were done to arrange for  prior to your scheduled appointment.   (2) List of current medications  (3) This referral request   (4) Any documents/labs given to you for this referral                  Follow-up notes from your care team     Return in about 1 month (around 4/22/2017).      Your next 10 appointments already scheduled     Apr 26,  2017  2:35 PM CDT   (Arrive by 2:20 PM)   Return Visit with Kash Solano MD   [facility] ([facility])    [address]  4th Floor  [facility] [facility] [address] [phone]            Jun 28, 2017  4:00 PM CDT   Lab with  LAB   [facility] ([facility])    [address]  1st Floor  [facility] [facility] [id number] [phone]            Jun 28, 2017  4:30 PM CDT   (Arrive by 4:15 PM)   Return Visit with Milan Peace MD   [facility] ([facility])    [address]  3rd Floor  [facility] [facility] [address] [phone]              Who to contact     Please call your clinic at [phone] to:    Ask questions about your health    Make or cancel appointments    Discuss your medicines    Learn about your test results    Speak to your doctor   If you have compliments or concerns about an experience at your clinic, or if you wish to file a complaint, please contact [facility] Patient Relations at [phone] or email us at [email or web address]         Additional Information About Your Visit        [employer] Information     [employer] gives you secure access to your electronic health record. If you see a primary care provider, you can also send messages to your care team and make appointments. If you have questions, please call your primary care clinic.  If you do not have a primary care provider, please call [phone] and they will assist you.      [employer] is an electronic gateway that provides easy, online access to your medical records. With [employer], you can request a clinic appointment, read your test results, renew a prescription or communicate with your care team.     To access your existing account, please contact your [facility] Clinic or call [phone] for assistance.        Care EveryWhere ID     This is your Care EveryWhere  ID. This could be used by other organizations to access your [place] medical records  [id number]        Your Vitals Were     Pulse Pulse Oximetry BMI (Body Mass Index)             71 100% 30.29 kg/m2          Blood Pressure from Last 3 Encounters:   03/22/17 117/75   02/24/17 126/73   01/25/17 100/64    Weight from Last 3 Encounters:   03/22/17 77.6 kg (171 lb)   01/25/17 78.9 kg (174 lb)   01/25/17 78.9 kg (174 lb)              We Performed the Following     ONC/HEME ADULT REFERRAL          Today's Medication Changes          These changes are accurate as of: 3/22/17  4:31 PM.  If you have any questions, ask your nurse or doctor.               These medicines have changed or have updated prescriptions.        Dose/Directions    Ketoprofen Powd   This may have changed:  additional instructions   Used for:  Fibromyalgia        Dose:  1 g   Apply 1 g topically 2 times daily as needed   Quantity:  100 g   Refills:  5       vitamin D 53573 UNIT capsule   Commonly known as:  ERGOCALCIFEROL   This may have changed:  additional instructions   Used for:  Vitamin D deficiency        Dose:  50929 Units   Take 1 capsule (50,000 Units) by mouth every 7 days Need a Vitamin D level in 2 months.   Quantity:  8 capsule   Refills:  0                Primary Care Provider Office Phone # Fax #    Kash Solano [phone]       [facility] [address] [place] [id number]        Thank you!     Thank you for choosing [facility]  for your care. Our goal is always to provide you with excellent care. Hearing back from our patients is one way we can continue to improve our services. Please take a few minutes to complete the written survey that you may receive in the mail after your visit with us. Thank you!             Your Updated Medication List - Protect others around you: Learn how to safely use, store and throw away your medicines at www.disposemymeds.org.          This  list is accurate as of: 3/22/17  4:31 PM.  Always use your most recent med list.                   Brand Name Dispense Instructions for use    acetaminophen 325 MG tablet    TYLENOL    250 tablet    Take 1-2 tablets (325-650 mg) by mouth every 6 hours as needed for pain (headache)       AEROSPAN 80 MCG/ACT Aers   Generic drug:  Flunisolide HFA      INHALE 2 PUFFS PO BID.  RINSE MOUTH AFTERWARDS       * albuterol 108 (90 BASE) MCG/ACT Inhaler    PROAIR HFA/PROVENTIL HFA/VENTOLIN HFA    3 Inhaler    Inhale 2 puffs into the lungs every 6 hours as needed for shortness of breath / dyspnea or wheezing       * albuterol (2.5 MG/3ML) 0.083% neb solution      INL 1 VIAL VIA NEBULIZATION Q 4 TO 6 HOURS PRN       * ASPIRIN LOW DOSE 81 MG EC tablet   Generic drug:  aspirin      TK 1 T PO D       * ASPIRIN LOW DOSE 81 MG EC tablet   Generic drug:  aspirin     90 tablet    TAKE 1 TABLET BY MOUTH EVERY DAY       azelastine 0.1 % spray    ASTELIN    1 Bottle    Spray 2 sprays into both nostrils 2 times daily       blood glucose monitoring lancets     4 Box    Use to test blood sugars 4 times daily or as directed.       blood glucose monitoring meter device kit    no brand specified    1 kit    Use to test blood sugar 4 X times daily or as directed.       blood glucose monitoring test strip    no brand specified    360 each    1 strip by In Vitro route 4 times daily Test as directed. Please dispense three months and three months of refills. Thank you.       clopidogrel 75 MG tablet    PLAVIX    90 tablet    Take 1 tablet (75 mg) by mouth daily       cyclobenzaprine 10 MG tablet    FLEXERIL    180 tablet    Take 1 tablet (10 mg) by mouth 2 times daily as needed for muscle spasms       desonide 0.05 % cream    DESOWEN     Apply topically 2 times daily       dicyclomine 20 MG tablet    BENTYL    60 tablet    Take 1 tablet (20 mg) by mouth 4 times daily as needed       diphenhydrAMINE 25 MG capsule    BENADRYL     TK 1 TO 2 CS PO QHS        EPIPEN 2-RIKY 0.3 MG/0.3ML injection   Generic drug:  EPINEPHrine      INJECT 0.3 MG INTO THE MUSCLE PRF ANAPHYALAXIS       ESOMEPRAZOLE MAGNESIUM PO      Take 20 mg by mouth 2 times daily (before meals)       ferrous gluconate 324 (38 FE) MG tablet    FERGON    180 tablet    Take 1 tablet (324 mg) by mouth 2 times daily       fexofenadine 180 MG tablet    ALLEGRA    90 tablet    Take 1 tablet by mouth daily as needed.       fluconazole 100 MG tablet    DIFLUCAN     Take 100 mg by mouth daily as needed       fluocinolone 0.01 % solution    SYNALAR     Apply topically daily as needed       fluocinonide 0.05 % solution    LIDEX    60 mL    Apply topically 2 times daily To areas of itch on the scalp as needed.       fluticasone 50 MCG/ACT spray    FLONASE     U 2 SPRAYS IEN D.       folic acid 1 MG tablet    FOLVITE         hydrALAZINE 25 MG tablet    APRESOLINE    90 tablet    Take 0.5 tablets (12.5 mg) by mouth daily . May take additional one-half tablet once daily if systolic blood pressure >140 mmHg.       hydroxychloroquine 200 MG tablet    PLAQUENIL    180 tablet    Take 2 tablets (400 mg) by mouth daily EYE EXAM DUE/LETTER SENT       insulin glargine 100 UNIT/ML injection     9 mL    Inject 40 Units Subcutaneous daily       insulin pen needle 32G X 4 MM     300 each    Use 1 daily as directed.       ketoconazole 2 % shampoo    NIZORAL     Apply topically daily as needed       Ketoprofen Powd     100 g    Apply 1 g topically 2 times daily as needed       lidocaine 5 % ointment    XYLOCAINE    50 g    Apply 1 g topically 2 times daily as needed for moderate pain       lisinopril-hydrochlorothiazide 20-25 MG per tablet    PRINZIDE/ZESTORETIC    90 tablet    TAKE 1 TABLET BY MOUTH DAILY       Magnesium Oxide -Mg Supplement 250 MG Tabs      TK 1 T PO BID. REDUCE IF STOOLS LOOSEN       metFORMIN 500 MG 24 hr tablet    GLUCOPHAGE-XR    60 tablet    Take 2 tablets (1,000 mg) by mouth daily (with dinner)        metoprolol 100 MG 24 hr tablet    TOPROL-XL    90 tablet    Take 1 tablet (100 mg) by mouth daily       metroNIDAZOLE 1 % cream    NORITATE     Apply topically daily       mometasone 50 MCG/ACT spray    NASONEX     Spray 2 sprays into both nostrils daily       montelukast 10 MG tablet    SINGULAIR    30 tablet    Take 1 tablet (10 mg) by mouth At Bedtime       nitroglycerin 0.4 MG sublingual tablet    NITROSTAT    25 tablet    Place 1 tablet (0.4 mg) under the tongue every 5 minutes as needed for chest pain       nystatin 146198 UNITS Tabs tablet    MYCOSTATIN     TK 2 TS PO BID       olopatadine 0.1 % ophthalmic solution    PATANOL    5 mL    Place 1 drop into both eyes 2 times daily as needed for allergies.       pravastatin 40 MG tablet    PRAVACHOL    30 tablet    Take 1 tablet (40 mg) by mouth daily       ranitidine 150 MG tablet    ZANTAC    60 tablet    Take 1 tablet (150 mg) by mouth 2 times daily       spironolactone 25 MG tablet    ALDACTONE    60 tablet    Take 2 tablets (50 mg) by mouth daily       * sucralfate 1 GM tablet    CARAFATE    40 tablet    Take 1 tablet (1 g) by mouth 4 times daily as needed       * sucralfate 1 GM tablet    CARAFATE    120 tablet    Take 1 tablet (1 g) by mouth 4 times daily as needed       traMADol 50 MG tablet    ULTRAM    60 tablet    Take 1 tablet (50 mg) by mouth every 8 hours as needed for moderate pain       TUMS 500 MG chewable tablet   Generic drug:  calcium carbonate      Take 3-4 chew tab by mouth daily as needed.       vitamin D 60569 UNIT capsule    ERGOCALCIFEROL    8 capsule    Take 1 capsule (50,000 Units) by mouth every 7 days Need a Vitamin D level in 2 months.       ZOFRAN PO          * Notice:  This list has 6 medication(s) that are the same as other medications prescribed for you. Read the directions carefully, and ask your doctor or other care provider to review them with you.

## 2017-03-22 NOTE — PATIENT INSTRUCTIONS
Primary Care Center Medication Refill Request Information:  * Please contact your pharmacy regarding ANY request for medication refills.  ** [facility] Prescription Fax = [phone]  * Please allow 3 business days for routine medication refills.  * Please allow 5 business days for controlled substance medication refills.     Primary Care Center Test Result notification information:  *You will be notified within 7-10 days of your appointment day regarding the results of your test.  If you are on MyChart you will be notified as soon as the provider has reviewed the results and signed off on them.

## 2017-03-22 NOTE — PROGRESS NOTES
SUBJECTIVE:    Pt is a 51 year old female with pmh of     Patient Active Problem List   Diagnosis     Seasonal affective disorder (H)     Allergic rhinitis     PCOS (polycystic ovarian syndrome)     Moderate persistent asthma     Chronic pancreatitis (H)     Hypertension goal BP (blood pressure) < 140/90     Common migraine     NSTEMI (non-ST elevated myocardial infarction) (H)     Hyperlipidemia LDL goal <70     ASCVD (arteriosclerotic cardiovascular disease)     GERD (gastroesophageal reflux disease)     Ischemic cardiomyopathy     Hypertensive heart disease     Uterine leiomyoma     Iron deficiency anemia     Costochondritis     Vitamin D deficiency     Breast cancer screening     Spinal stenosis in cervical region     Fibromyalgia     Seronegative rheumatoid arthritis (H)     Type 2 diabetes, HbA1C goal < 8% (H)     Type 2 diabetes mellitus with other specified complication (H)     Hyperlipidemia associated with type 2 diabetes mellitus (H)     Hypertension associated with diabetes (H)     Overweight with body mass index (BMI) 25.0-29.9     Tobacco use disorder     Telogen effluvium     CAD S/P percutaneous coronary angioplasty     Status post coronary angiogram       who is here for evaluation of had concerns including Results.    Here for a few things.  One noting easier bruising over the last year or so. Is on asa, Plavix. Has been on prednsione, not now. No joint bleeds.   Two, her provider at [facility] wonders if she could have mast cell activiation syndrome.     Due for f/u rheum; she wonders about second opinion. I told her if did, consider [facility]; she might see if can go to [place].    Allergies   Allergen Reactions     Amoxicillin-Pot Clavulanate      Augmentin      Unknown possible hives and edema     Azithromycin      Diatrizoate Other (See Comments)     Pt wants this listed because she is allergic to shellfish      Imitrex [Sumatriptan]      Severe face/neck/chest tightness and flushing  side effects      Penicillins Hives     Unknown      Pork Allergy      Stomach pain, cramping, diarrhea, itching, nausea and headaches     Shellfish Allergy Hives and Swelling     Sulfa Drugs Hives and Swelling     Zithromax [Azithromycin Dihydrate] Swelling     Unknown            Current Outpatient Prescriptions   Medication Sig Dispense Refill     fluconazole (DIFLUCAN) 100 MG tablet Take 100 mg by mouth daily as needed       hydroxychloroquine (PLAQUENIL) 200 MG tablet Take 2 tablets (400 mg) by mouth daily EYE EXAM DUE/LETTER SENT 180 tablet 0     insulin glargine (BASAGLAR KWIKPEN) 100 UNIT/ML injection Inject 40 Units Subcutaneous daily 9 mL 3     ranitidine (ZANTAC) 150 MG tablet Take 1 tablet (150 mg) by mouth 2 times daily 60 tablet 2     ferrous gluconate (FERGON) 324 (38 FE) MG tablet Take 1 tablet (324 mg) by mouth 2 times daily 180 tablet 1     ASPIRIN LOW DOSE 81 MG EC tablet TAKE 1 TABLET BY MOUTH EVERY DAY 90 tablet 3     blood glucose monitoring (ONE TOUCH DELICA) lancets Use to test blood sugars 4 times daily or as directed. 4 Box 3     vitamin D (ERGOCALCIFEROL) 35536 UNIT capsule Take 1 capsule (50,000 Units) by mouth every 7 days Need a Vitamin D level in 2 months. (Patient taking differently: Take 50,000 Units by mouth every 7 days Need a Vitamin D level in 2 months.) 8 capsule 0     hydrALAZINE (APRESOLINE) 25 MG tablet Take 0.5 tablets (12.5 mg) by mouth daily . May take additional one-half tablet once daily if systolic blood pressure >140 mmHg. 90 tablet 3     metoprolol (TOPROL-XL) 100 MG 24 hr tablet Take 1 tablet (100 mg) by mouth daily 90 tablet 3     clopidogrel (PLAVIX) 75 MG tablet Take 1 tablet (75 mg) by mouth daily 90 tablet 3     lisinopril-hydrochlorothiazide (PRINZIDE/ZESTORETIC) 20-25 MG per tablet TAKE 1 TABLET BY MOUTH DAILY 90 tablet 3     blood glucose monitoring (NO BRAND SPECIFIED) meter device kit Use to test blood sugar 4 X times daily or as directed. 1 kit 0     blood  glucose monitoring (NO BRAND SPECIFIED) test strip 1 strip by In Vitro route 4 times daily Test as directed. Please dispense three months and three months of refills. Thank you. 360 each 3     diphenhydrAMINE (BENADRYL) 25 MG capsule TK 1 TO 2 CS PO QHS  4     EPIPEN 2-RIKY 0.3 MG/0.3ML injection INJECT 0.3 MG INTO THE MUSCLE PRF ANAPHYALAXIS  0     AEROSPAN 80 MCG/ACT AERS INHALE 2 PUFFS PO BID.  RINSE MOUTH AFTERWARDS  4     fluticasone (FLONASE) 50 MCG/ACT spray U 2 SPRAYS IEN D.  3     folic acid (FOLVITE) 1 MG tablet   4     Magnesium Oxide -Mg Supplement 250 MG TABS TK 1 T PO BID. REDUCE IF STOOLS LOOSEN  11     nystatin (MYCOSTATIN) 433856 UNITS TABS tablet TK 2 TS PO BID  0     albuterol (2.5 MG/3ML) 0.083% neb solution INL 1 VIAL VIA NEBULIZATION Q 4 TO 6 HOURS PRN  1     ASPIRIN LOW DOSE 81 MG EC tablet TK 1 T PO D  3     dicyclomine (BENTYL) 20 MG tablet Take 1 tablet (20 mg) by mouth 4 times daily as needed 60 tablet 5     sucralfate (CARAFATE) 1 GM tablet Take 1 tablet (1 g) by mouth 4 times daily as needed 120 tablet 3     azelastine (ASTELIN) 0.1 % spray Spray 2 sprays into both nostrils 2 times daily 1 Bottle 3     fluocinolone (SYNALAR) 0.01 % solution Apply topically daily as needed       mometasone (NASONEX) 50 MCG/ACT spray Spray 2 sprays into both nostrils daily       traMADol (ULTRAM) 50 MG tablet Take 1 tablet (50 mg) by mouth every 8 hours as needed for moderate pain 60 tablet 3     cyclobenzaprine (FLEXERIL) 10 MG tablet Take 1 tablet (10 mg) by mouth 2 times daily as needed for muscle spasms 180 tablet 0     Ondansetron HCl (ZOFRAN PO)        pravastatin (PRAVACHOL) 40 MG tablet Take 1 tablet (40 mg) by mouth daily 30 tablet 11     spironolactone (ALDACTONE) 25 MG tablet Take 2 tablets (50 mg) by mouth daily 60 tablet 11     metFORMIN (GLUCOPHAGE-XR) 500 MG 24 hr tablet Take 2 tablets (1,000 mg) by mouth daily (with dinner) 60 tablet 11     montelukast (SINGULAIR) 10 MG tablet Take 1 tablet  (10 mg) by mouth At Bedtime 30 tablet 1     ESOMEPRAZOLE MAGNESIUM PO Take 20 mg by mouth 2 times daily (before meals)       insulin pen needle 32G X 4 MM Use 1 daily as directed. 300 each 3     acetaminophen (TYLENOL) 325 MG tablet Take 1-2 tablets (325-650 mg) by mouth every 6 hours as needed for pain (headache) 250 tablet 0     Ketoprofen POWD Apply 1 g topically 2 times daily as needed (Patient taking differently: Apply 1 g topically 2 times daily as needed CREAM) 100 g 5     lidocaine (XYLOCAINE) 5 % ointment Apply 1 g topically 2 times daily as needed for moderate pain 50 g 5     metroNIDAZOLE (NORITATE) 1 % cream Apply topically daily       desonide (DESOWEN) 0.05 % cream Apply topically 2 times daily       ketoconazole (NIZORAL) 2 % shampoo Apply topically daily as needed       fluocinonide (LIDEX) 0.05 % external solution Apply topically 2 times daily To areas of itch on the scalp as needed. 60 mL 11     sucralfate (CARAFATE) 1 GM tablet Take 1 tablet (1 g) by mouth 4 times daily as needed 40 tablet 1     nitroglycerin (NITROSTAT) 0.4 MG SL tablet Place 1 tablet (0.4 mg) under the tongue every 5 minutes as needed for chest pain 25 tablet 1     albuterol (PROAIR HFA, PROVENTIL HFA, VENTOLIN HFA) 108 (90 BASE) MCG/ACT inhaler Inhale 2 puffs into the lungs every 6 hours as needed for shortness of breath / dyspnea or wheezing 3 Inhaler 1     calcium carbonate (TUMS) 500 MG chewable tablet Take 3-4 chew tab by mouth daily as needed.       fexofenadine (ALLEGRA) 180 MG tablet Take 1 tablet by mouth daily as needed. 90 tablet 3     olopatadine (PATANOL) 0.1 % ophthalmic solution Place 1 drop into both eyes 2 times daily as needed for allergies. 5 mL 12       Social History   Substance Use Topics     Smoking status: Former Smoker     Packs/day: 0.20     Years: 1.00     Types: Cigarettes     Quit date: 2/1/2016     Smokeless tobacco: Never Used     Alcohol use No           OBJECTIVE:  /75  Pulse 71  Wt 77.6  kg (171 lb)  SpO2 100%  BMI 30.29 kg/m2  GENERAL APPEARANCE: Alert, no acute distress  SKIN: no suspicious lesions or rashes, one inch flat bruise left forearm     ASSESSMENT/PLAN:    Easy bruising: recent platets normal. See heme. Likely her meds. She might ask for their opinoin on mast cell activation as well    Autoimmune d/o, I told her due for rheum f/u, she might seek second opinion instead, as above, will let me know if needs referral    She also had questions about gettng help at home, I've asked SW to call her    WENDY HARRISON MD

## 2017-03-23 ENCOUNTER — TELEPHONE (OUTPATIENT)
Dept: FAMILY MEDICINE | Facility: CLINIC | Age: 52
End: 2017-03-23

## 2017-03-27 ENCOUNTER — TRANSFERRED RECORDS (OUTPATIENT)
Dept: HEALTH INFORMATION MANAGEMENT | Facility: CLINIC | Age: 52
End: 2017-03-27

## 2017-03-28 ENCOUNTER — TELEPHONE (OUTPATIENT)
Dept: RHEUMATOLOGY | Facility: CLINIC | Age: 52
End: 2017-03-28

## 2017-03-28 ENCOUNTER — DOCUMENTATION ONLY (OUTPATIENT)
Dept: ANESTHESIOLOGY | Facility: CLINIC | Age: 52
End: 2017-03-28

## 2017-03-28 NOTE — TELEPHONE ENCOUNTER
"Pt called re: R periorbital swelling. Says swelling ongoing for past yr. \"Looks like blister, black eye.\" Blurred vision, eye patch she wears not fitting anymore. Denies S/S anaphylaxis. Her doctor told her swelling looking worse, advised her ask for appt w/ Dr. Pratibha GREEN after CT scan of head done for persistent sinus infection. Please advise at [phone]. DVM fine. Sent to [facility].  "

## 2017-03-28 NOTE — PROGRESS NOTES
LPN called and spoke with Pharmacist from the [facility] regarding pt's request for Lidocaine ointment and Ketop/salt. Pt was last seen in clinic January of 2016. Pt was asked to follow up in 6 months, but never made an appointment.  LPN called the pharmacy to route the prescription request to the pt's PCP.   Pharmacist stated that the prescription requests for Dr. Chowdhury had already been canceled and routed to pt's PCP per the pt's request.    Flaca Bashir LPN

## 2017-03-29 DIAGNOSIS — M19.90 ARTHRITIS: Primary | ICD-10-CM

## 2017-03-29 RX ORDER — FOLIC ACID 1 MG/1
TABLET ORAL
Qty: 90 TABLET | Refills: 2 | Status: SHIPPED | OUTPATIENT
Start: 2017-03-29 | End: 2017-05-05

## 2017-03-29 NOTE — TELEPHONE ENCOUNTER
Called and let pt know Dr. Mckinnon's recommendations. Pt states that her neuro-opthamalogist and allergist have spoken and they are deferring treatment to Dr. Mckinnon.  Discussed that Dr. Mckinnon had recommended Methotrexate earlier, and pt did not want to try that.      Have let pt know that she has her appt with Dr. Mckinnon next Friday, and there are no other recommendations at this point.    Desiree Godinez RN  Rheumatology Clinic

## 2017-03-29 NOTE — TELEPHONE ENCOUNTER
I recommended adding MTX long time ago to prevent flare ups, but unfortunately she declined.    Recommend her to schedule an appointment with neuro-ophthalmologist who has been managing her periorbital swelling.

## 2017-03-29 NOTE — TELEPHONE ENCOUNTER
Folic acid   Last Written Prescription Date:  Pt reported    Last Office Visit : 11/18/16  Future Office visit:  no    Routing refill request to provider for review/approval because:  Medication is reported/historical

## 2017-03-29 NOTE — TELEPHONE ENCOUNTER
Called and spoke with pt as she is c/o swelling in her R periorbital area again.  For the last month, pt has had increased mucus production, and HA's.  Went to see allergist on Monday, R eye not only has increased swelling but also has blurry vision in that eye.  Pt had a CT of sinuses done, allergist states that she does not have a sinus infection, but the mass that is present has grown considerably.  Pt states that she does not have the report or the scan to let us see, but her allergist would be willing to discuss with Dr. Mckinnon what it is showing.    Pt was offered an appt for next week, and will send to Dr. Mckinnon for review and advice.    Desiree Godinez RN  Rheumatology Clinic

## 2017-03-30 DIAGNOSIS — M35.9 UNDIFFERENTIATED CONNECTIVE TISSUE DISEASE (H): ICD-10-CM

## 2017-03-30 NOTE — TELEPHONE ENCOUNTER
Last seen: 11/18/16  Pending appt: 4/7/17  Medication: Tramadol 50 mg tablet, 1 tablet every 8 hours as needed for Moderate pain   Last filled: 3/10/2016  Qty: 60       checked.  No other narcotics being prescribed by other providers in last 8 months.    Will route to provider for signature.    Desiree Godinez RN  Rheumatology Clinic

## 2017-03-31 DIAGNOSIS — M79.7 FIBROMYALGIA: ICD-10-CM

## 2017-03-31 RX ORDER — TRAMADOL HYDROCHLORIDE 50 MG/1
50 TABLET ORAL EVERY 8 HOURS PRN
Qty: 60 TABLET | Refills: 3 | Status: SHIPPED | OUTPATIENT
Start: 2017-03-31 | End: 2017-12-26

## 2017-04-03 RX ORDER — CYCLOBENZAPRINE HCL 10 MG
TABLET ORAL
Qty: 180 TABLET | Refills: 0 | OUTPATIENT
Start: 2017-04-03

## 2017-04-04 DIAGNOSIS — M05.732 RHEUMATOID ARTHRITIS INVOLVING BOTH WRISTS WITH POSITIVE RHEUMATOID FACTOR (H): ICD-10-CM

## 2017-04-04 DIAGNOSIS — M05.731 RHEUMATOID ARTHRITIS INVOLVING BOTH WRISTS WITH POSITIVE RHEUMATOID FACTOR (H): ICD-10-CM

## 2017-04-04 DIAGNOSIS — M19.90 ARTHRITIS: ICD-10-CM

## 2017-04-04 DIAGNOSIS — I10 ESSENTIAL HYPERTENSION: ICD-10-CM

## 2017-04-04 DIAGNOSIS — M79.7 FIBROMYALGIA: ICD-10-CM

## 2017-04-04 NOTE — TELEPHONE ENCOUNTER
Ketoprofen 10% in PLO gel      Last Written Prescription Date:  11/19/2015  Last Fill Quantity: 100 grams,   # refills: 5  Last Office Visit with [facility], [facility] or [facility] prescribing provider: 03/22/17  Future Office visit:       Routing refill request to provider for review/approval because:  Drug not on the [facility], [facility] or [facility] refill protocol or controlled substance      Lidocaine 5% ointment      Last Written Prescription Date:  11/19/15   Last Fill Quantity: 50 grams,   # refills: 5  Last Office Visit with [facility], [facility] or [facility] prescribing provider: 03/22/17  Future Office visit:       Routing refill request to provider for review/approval because:  Drug not on the [facility], [facility] or [facility] refill protocol or controlled substance      Jose Armando Vu [facility]  [phone]

## 2017-04-05 RX ORDER — LIDOCAINE 50 MG/G
1 OINTMENT TOPICAL 2 TIMES DAILY PRN
Qty: 50 G | Refills: 5 | Status: SHIPPED | OUTPATIENT
Start: 2017-04-05 | End: 2019-03-06

## 2017-04-05 RX ORDER — HYDRALAZINE HYDROCHLORIDE 25 MG/1
12.5 TABLET, FILM COATED ORAL DAILY
Qty: 90 TABLET | Refills: 3 | Status: SHIPPED | OUTPATIENT
Start: 2017-04-05 | End: 2017-06-06

## 2017-04-05 NOTE — TELEPHONE ENCOUNTER
"Received request from provider to contact pt regarding concerns about services at home including PCA.  Chart reviewed.    Contacted pt by phone to offer support and resources.  Introduced role and inquired about pt's current need for support and services.  Pt identified trouble with pharmacy/obtaining medication consistently as primary concern.  Spoke at length about issues with pharmacy due to having \"several medications, trying to get them all filled at the same time every month, but it always gets screwed up.\"  Pt also expressed concern about insurance coverage as her [place] [employer] plan will transfer to a new plan as of 5/1, but she is trying to choose a health plan based on her current providers.  Pt clearly identified several pathways that she has accessed to try to get information that she needs, but she has come up against barriers to getting information including inaccurate information from providers, long hold times, and confusing information.  Pt also expressed interest in a referral to PCA assessment.     Discussed resources including case management through insurance, accessing alternate pharmacy provider, direct contact information for medical plans ([employer]), and follow up with  in clinic.  Pt responsive to resources offered.  Indicated plan to follow up with phone calls and follow up with  in clinic.    Social work provided pt with contact information and will follow up with pt as appropriate.          "

## 2017-04-07 ENCOUNTER — OFFICE VISIT (OUTPATIENT)
Dept: RHEUMATOLOGY | Facility: CLINIC | Age: 52
End: 2017-04-07
Attending: INTERNAL MEDICINE
Payer: COMMERCIAL

## 2017-04-07 VITALS
HEART RATE: 74 BPM | SYSTOLIC BLOOD PRESSURE: 101 MMHG | WEIGHT: 168 LBS | DIASTOLIC BLOOD PRESSURE: 60 MMHG | BODY MASS INDEX: 29.76 KG/M2 | OXYGEN SATURATION: 99 %

## 2017-04-07 DIAGNOSIS — H05.111: Primary | ICD-10-CM

## 2017-04-07 DIAGNOSIS — H05.111: ICD-10-CM

## 2017-04-07 DIAGNOSIS — E55.9 VITAMIN D DEFICIENCY: ICD-10-CM

## 2017-04-07 LAB
ALBUMIN SERPL-MCNC: 4.3 G/DL (ref 3.4–5)
ALT SERPL W P-5'-P-CCNC: 23 U/L (ref 0–50)
AST SERPL W P-5'-P-CCNC: 18 U/L (ref 0–45)
BASOPHILS # BLD AUTO: 0.1 10E9/L (ref 0–0.2)
BASOPHILS NFR BLD AUTO: 0.4 %
CREAT SERPL-MCNC: 1.2 MG/DL (ref 0.52–1.04)
CRP SERPL-MCNC: 3.7 MG/L (ref 0–8)
DIFFERENTIAL METHOD BLD: ABNORMAL
EOSINOPHIL # BLD AUTO: 0.3 10E9/L (ref 0–0.7)
EOSINOPHIL NFR BLD AUTO: 3 %
ERYTHROCYTE [DISTWIDTH] IN BLOOD BY AUTOMATED COUNT: 13.2 % (ref 10–15)
ERYTHROCYTE [SEDIMENTATION RATE] IN BLOOD BY WESTERGREN METHOD: 17 MM/H (ref 0–30)
GFR SERPL CREATININE-BSD FRML MDRD: 47 ML/MIN/1.7M2
HCT VFR BLD AUTO: 38.7 % (ref 35–47)
HGB BLD-MCNC: 12.5 G/DL (ref 11.7–15.7)
IMM GRANULOCYTES # BLD: 0.1 10E9/L (ref 0–0.4)
IMM GRANULOCYTES NFR BLD: 0.4 %
LYMPHOCYTES # BLD AUTO: 2.6 10E9/L (ref 0.8–5.3)
LYMPHOCYTES NFR BLD AUTO: 22.9 %
MCH RBC QN AUTO: 26.2 PG (ref 26.5–33)
MCHC RBC AUTO-ENTMCNC: 32.3 G/DL (ref 31.5–36.5)
MCV RBC AUTO: 81 FL (ref 78–100)
MONOCYTES # BLD AUTO: 0.7 10E9/L (ref 0–1.3)
MONOCYTES NFR BLD AUTO: 6.2 %
NEUTROPHILS # BLD AUTO: 7.5 10E9/L (ref 1.6–8.3)
NEUTROPHILS NFR BLD AUTO: 67.1 %
NRBC # BLD AUTO: 0 10*3/UL
NRBC BLD AUTO-RTO: 0 /100
PLATELET # BLD AUTO: 347 10E9/L (ref 150–450)
RBC # BLD AUTO: 4.77 10E12/L (ref 3.8–5.2)
WBC # BLD AUTO: 11.3 10E9/L (ref 4–11)

## 2017-04-07 PROCEDURE — 82180 ASSAY OF ASCORBIC ACID: CPT | Performed by: INTERNAL MEDICINE

## 2017-04-07 PROCEDURE — 36415 COLL VENOUS BLD VENIPUNCTURE: CPT | Performed by: INTERNAL MEDICINE

## 2017-04-07 PROCEDURE — 86140 C-REACTIVE PROTEIN: CPT | Performed by: INTERNAL MEDICINE

## 2017-04-07 PROCEDURE — 83516 IMMUNOASSAY NONANTIBODY: CPT | Performed by: INTERNAL MEDICINE

## 2017-04-07 PROCEDURE — 84460 ALANINE AMINO (ALT) (SGPT): CPT | Performed by: INTERNAL MEDICINE

## 2017-04-07 PROCEDURE — 99212 OFFICE O/P EST SF 10 MIN: CPT | Mod: ZF

## 2017-04-07 PROCEDURE — 82787 IGG 1 2 3 OR 4 EACH: CPT | Performed by: INTERNAL MEDICINE

## 2017-04-07 PROCEDURE — 82565 ASSAY OF CREATININE: CPT | Performed by: INTERNAL MEDICINE

## 2017-04-07 PROCEDURE — 81401 MOPATH PROCEDURE LEVEL 2: CPT | Performed by: INTERNAL MEDICINE

## 2017-04-07 PROCEDURE — 82306 VITAMIN D 25 HYDROXY: CPT | Performed by: INTERNAL MEDICINE

## 2017-04-07 PROCEDURE — 82784 ASSAY IGA/IGD/IGG/IGM EACH: CPT | Performed by: INTERNAL MEDICINE

## 2017-04-07 PROCEDURE — 85652 RBC SED RATE AUTOMATED: CPT | Performed by: INTERNAL MEDICINE

## 2017-04-07 PROCEDURE — 85025 COMPLETE CBC W/AUTO DIFF WBC: CPT | Performed by: INTERNAL MEDICINE

## 2017-04-07 PROCEDURE — 82164 ANGIOTENSIN I ENZYME TEST: CPT | Performed by: INTERNAL MEDICINE

## 2017-04-07 PROCEDURE — 86255 FLUORESCENT ANTIBODY SCREEN: CPT | Performed by: INTERNAL MEDICINE

## 2017-04-07 PROCEDURE — 83876 ASSAY MYELOPEROXIDASE: CPT | Performed by: INTERNAL MEDICINE

## 2017-04-07 PROCEDURE — 82040 ASSAY OF SERUM ALBUMIN: CPT | Performed by: INTERNAL MEDICINE

## 2017-04-07 PROCEDURE — 84450 TRANSFERASE (AST) (SGOT): CPT | Performed by: INTERNAL MEDICINE

## 2017-04-07 PROCEDURE — 86256 FLUORESCENT ANTIBODY TITER: CPT | Performed by: INTERNAL MEDICINE

## 2017-04-07 ASSESSMENT — PAIN SCALES - GENERAL: PAINLEVEL: MILD PAIN (3)

## 2017-04-07 NOTE — LETTER
Patient:  Janine Cornell  :   1965  MRN:     [id number]        Ms.Lisa CHELLE Cornell  [address] [address]        2017    Dear ,    We are writing to inform you of your test results. Negative sarcoidosis test.      Resulted Orders   Angiotensin converting enzyme   Result Value Ref Range    Angiotensin Converting Enzyme (L)      <5  Reference range: 9 to 67  Unit: U/L  (Note)  Performed by [facility],  [address] [place] [address] [phone]  [email or web address], Ameya Akers MD, Lab. Director         Majo Mckinnon MD

## 2017-04-07 NOTE — MR AVS SNAPSHOT
After Visit Summary   4/7/2017    Janine Cornell    MRN: [id number]           Patient Information     Date Of Birth          1965        Visit Information        Provider Department      4/7/2017 1:00 PM Majo Mckinnon MD [facility] Rheumatology        Today's Diagnoses     Pseudotumor, orbit inflammatory, right    -  1      Care Instructions    Start MTX 4 tab a week   Labs today and MTX monitoring labs on 4/28/2017  F/U with me on 5/5 at 2:30 pm  Restasis eyedrop for dry eyes, please discuss with your eye doctor  CT chest/abdomen/pelvis              Follow-ups after your visit        Your next 10 appointments already scheduled     Apr 07, 2017  4:40 PM CDT   (Arrive by 4:25 PM)   CT CHEST W/O CONTRAST with [facility] [place] [address] CT ([facility])    [address] [place] [address] [phone]           Please bring any scans or X-rays taken at other hospitals, if similar tests were done. Also bring a list of your medicines, including vitamins, minerals and over-the-counter drugs. It is safest to leave personal items at home.  Be sure to tell your doctor:   If you have any allergies.   If there s any chance you are pregnant.   If you are breastfeeding.   If you have any special needs.  You do not need to do anything special to prepare.  Please wear loose clothing, such as a sweat suit or jogging clothes. Avoid snaps, zippers and other metal. We may ask you to undress and put on a hospital gown.            Apr 07, 2017  5:00 PM CDT   CT ABDOMEN PELVIS W/O CONTRAST with [facility]   [facility] CT ([facility])    [address] [address] [phone]           Please bring any scans or X-rays taken at other hospitals, if similar tests were done. Also bring a list of your medicines, including vitamins, minerals and over-the-counter drugs. It is safest to leave personal  items at home.  Be sure to tell your doctor:   If you have any allergies.   If there s any chance you are pregnant.   If you are breastfeeding.   If you have any special needs.  You do not need to do anything special to prepare.  Please wear loose clothing, such as a sweat suit or jogging clothes. Avoid snaps, zippers and other metal. We may ask you to undress and put on a hospital gown.            Apr 26, 2017  2:35 PM CDT   (Arrive by 2:20 PM)   Return Visit with Kash Solano MD   [facility] ([facility])    [address] [place] [address]  4th [facility] [address]   [phone]            May 05, 2017  2:30 PM CDT   (Arrive by 2:15 PM)   Return Visit with Majo Mckinnon MD   [facility] Rheumatology ([facility])    [address] [place] [address]  3rd [facility] [address]   [phone]            Jun 28, 2017  4:00 PM CDT   Lab with  LAB   [facility] Lab ([facility])    [address] [place] [address]  1st [facility] [address]   [phone]            Jun 28, 2017  4:30 PM CDT   (Arrive by 4:15 PM)   Return Visit with Milan Peace MD   [facility] Care ([facility])    [address] [place] [address]  3rd [facility] [address]   [phone]              Future tests that were ordered for you today     Open Future Orders        Priority Expected Expires Ordered    CT Abdomen Pelvis w/o Contrast Routine  4/7/2018 4/7/2017    CT Chest w/o contrast Routine  11/3/2017 4/7/2017    Albumin level Routine 4/28/2017 4/7/2019 4/7/2017    ALT Routine 4/28/2017 4/7/2019 4/7/2017    AST Routine 4/28/2017 4/7/2019 4/7/2017    CBC with platelets differential Routine 4/28/2017 4/7/2019 4/7/2017    Creatinine Routine 4/28/2017 4/7/2019 4/7/2017    ALT Routine  4/7/2018 4/7/2017    Albumin level Routine  4/7/2018 4/7/2017    AST Routine  4/7/2018 4/7/2017    CBC with platelets differential Routine   4/7/2018 4/7/2017    Creatinine Routine  4/7/2018 4/7/2017    CRP inflammation Routine  4/7/2018 4/7/2017    Erythrocyte sedimentation rate auto Routine  4/7/2018 4/7/2017            Who to contact     If you have questions or need follow up information about today's clinic visit or your schedule please contact [facility] RHEUMATOLOGY directly at [phone].  Normal or non-critical lab and imaging results will be communicated to you by [email or web address], letter or phone within 4 business days after the clinic has received the results. If you do not hear from us within 7 days, please contact the clinic through [employer] or phone. If you have a critical or abnormal lab result, we will notify you by phone as soon as possible.  Submit refill requests through [employer] or call your pharmacy and they will forward the refill request to us. Please allow 3 business days for your refill to be completed.          Additional Information About Your Visit        [email or web address] Information     [employer] gives you secure access to your electronic health record. If you see a primary care provider, you can also send messages to your care team and make appointments. If you have questions, please call your primary care clinic.  If you do not have a primary care provider, please call [phone] and they will assist you.        Care EveryWhere ID     This is your Care EveryWhere ID. This could be used by other organizations to access your [facility] medical records  [id number]        Your Vitals Were     Pulse Pulse Oximetry BMI (Body Mass Index)             74 99% 29.76 kg/m2          Blood Pressure from Last 3 Encounters:   04/07/17 101/60   03/22/17 117/75   02/24/17 126/73    Weight from Last 3 Encounters:   04/07/17 76.2 kg (168 lb)   03/22/17 77.6 kg (171 lb)   01/25/17 78.9 kg (174 lb)              We Performed the Following     Angiotensin converting enzyme     Antineutrophil cytoplasmic Marline IgG     Immunoglobulin G subclasses     TPMT Genotype      Vasculitis panel     Vitamin C          Today's Medication Changes          These changes are accurate as of: 4/7/17  2:30 PM.  If you have any questions, ask your nurse or doctor.               Start taking these medicines.        Dose/Directions    methotrexate 2.5 MG tablet CHEMO   Used for:  Pseudotumor, orbit inflammatory, right   Started by:  Majo Mckinnon MD        Dose:  10 mg   Take 4 tablets (10 mg) by mouth once a week Take 4 tablets (10mg) by mouth weekly.   Quantity:  16 tablet   Refills:  2         These medicines have changed or have updated prescriptions.        Dose/Directions    vitamin D 51760 UNIT capsule   Commonly known as:  ERGOCALCIFEROL   This may have changed:  additional instructions   Used for:  Vitamin D deficiency        Dose:  77338 Units   Take 1 capsule (50,000 Units) by mouth every 7 days Need a Vitamin D level in 2 months.   Quantity:  8 capsule   Refills:  0            Where to get your medicines      These medications were sent to [facility] [address] [facility] [address]  [address] [facility] [address] [place], [place] [id number]    Hours:  TRANSPLANT PHONE NUMBER [phone] Phone:  [phone]     methotrexate 2.5 MG tablet CHEMO                Primary Care Provider Office Phone # Fax #    Kash Solano [phone]       [facility] [address] [place] [id number]        Thank you!     Thank you for choosing [facility] RHEUMATOLOGY  for your care. Our goal is always to provide you with excellent care. Hearing back from our patients is one way we can continue to improve our services. Please take a few minutes to complete the written survey that you may receive in the mail after your visit with us. Thank you!             Your Updated Medication List - Protect others around you: Learn how to safely use, store and throw away your medicines at www.disposemymeds.org.          This list is accurate as of:  4/7/17  2:30 PM.  Always use your most recent med list.                   Brand Name Dispense Instructions for use    acetaminophen 325 MG tablet    TYLENOL    250 tablet    Take 1-2 tablets (325-650 mg) by mouth every 6 hours as needed for pain (headache)       AEROSPAN 80 MCG/ACT Aers   Generic drug:  Flunisolide HFA      INHALE 2 PUFFS PO BID.  RINSE MOUTH AFTERWARDS       * albuterol 108 (90 BASE) MCG/ACT Inhaler    PROAIR HFA/PROVENTIL HFA/VENTOLIN HFA    3 Inhaler    Inhale 2 puffs into the lungs every 6 hours as needed for shortness of breath / dyspnea or wheezing       * albuterol (2.5 MG/3ML) 0.083% neb solution      INL 1 VIAL VIA NEBULIZATION Q 4 TO 6 HOURS PRN       * ASPIRIN LOW DOSE 81 MG EC tablet   Generic drug:  aspirin      TK 1 T PO D       * ASPIRIN LOW DOSE 81 MG EC tablet   Generic drug:  aspirin     90 tablet    TAKE 1 TABLET BY MOUTH EVERY DAY       azelastine 0.1 % spray    ASTELIN    1 Bottle    Spray 2 sprays into both nostrils 2 times daily       blood glucose monitoring lancets     4 Box    Use to test blood sugars 4 times daily or as directed.       blood glucose monitoring meter device kit    no brand specified    1 kit    Use to test blood sugar 4 X times daily or as directed.       blood glucose monitoring test strip    no brand specified    360 each    1 strip by In Vitro route 4 times daily Test as directed. Please dispense three months and three months of refills. Thank you.       clopidogrel 75 MG tablet    PLAVIX    90 tablet    Take 1 tablet (75 mg) by mouth daily       cyclobenzaprine 10 MG tablet    FLEXERIL    180 tablet    Take 1 tablet (10 mg) by mouth 2 times daily as needed for muscle spasms       desonide 0.05 % cream    DESOWEN     Apply topically 2 times daily       dicyclomine 20 MG tablet    BENTYL    60 tablet    Take 1 tablet (20 mg) by mouth 4 times daily as needed       diphenhydrAMINE 25 MG capsule    BENADRYL     TK 1 TO 2 CS PO QHS       EPIPEN 2-RIKY 0.3  MG/0.3ML injection   Generic drug:  EPINEPHrine      INJECT 0.3 MG INTO THE MUSCLE PRF ANAPHYALAXIS       ESOMEPRAZOLE MAGNESIUM PO      Take 20 mg by mouth 2 times daily (before meals)       ferrous gluconate 324 (38 FE) MG tablet    FERGON    180 tablet    Take 1 tablet (324 mg) by mouth 2 times daily       fexofenadine 180 MG tablet    ALLEGRA    90 tablet    Take 1 tablet by mouth daily as needed.       fluconazole 100 MG tablet    DIFLUCAN     Take 100 mg by mouth daily as needed       fluocinolone 0.01 % solution    SYNALAR     Apply topically daily as needed       fluocinonide 0.05 % solution    LIDEX    60 mL    Apply topically 2 times daily To areas of itch on the scalp as needed.       fluticasone 50 MCG/ACT spray    FLONASE     U 2 SPRAYS IEN D.       folic acid 1 MG tablet    FOLVITE    90 tablet    1 tab daily       hydrALAZINE 25 MG tablet    APRESOLINE    90 tablet    Take 0.5 tablets (12.5 mg) by mouth daily . May take additional one-half tablet once daily if systolic blood pressure >140 mmHg.       hydroxychloroquine 200 MG tablet    PLAQUENIL    180 tablet    Take 2 tablets (400 mg) by mouth daily EYE EXAM DUE/LETTER SENT       insulin glargine 100 UNIT/ML injection     9 mL    Inject 40 Units Subcutaneous daily       insulin pen needle 32G X 4 MM     300 each    Use 1 daily as directed.       ketoconazole 2 % shampoo    NIZORAL     Apply topically daily as needed       Ketoprofen Powd     100 g    Apply 1 g topically 2 times daily as needed       lidocaine 5 % ointment    XYLOCAINE    50 g    Apply 1 g topically 2 times daily as needed for moderate pain       lisinopril-hydrochlorothiazide 20-25 MG per tablet    PRINZIDE/ZESTORETIC    90 tablet    TAKE 1 TABLET BY MOUTH DAILY       Magnesium Oxide -Mg Supplement 250 MG Tabs      TK 1 T PO BID. REDUCE IF STOOLS LOOSEN       metFORMIN 500 MG 24 hr tablet    GLUCOPHAGE-XR    60 tablet    Take 2 tablets (1,000 mg) by mouth daily (with dinner)        methotrexate 2.5 MG tablet CHEMO     16 tablet    Take 4 tablets (10 mg) by mouth once a week Take 4 tablets (10mg) by mouth weekly.       metoprolol 100 MG 24 hr tablet    TOPROL-XL    90 tablet    Take 1 tablet (100 mg) by mouth daily       metroNIDAZOLE 1 % cream    NORITATE     Apply topically daily       mometasone 50 MCG/ACT spray    NASONEX     Spray 2 sprays into both nostrils daily       montelukast 10 MG tablet    SINGULAIR    30 tablet    Take 1 tablet (10 mg) by mouth At Bedtime       nitroglycerin 0.4 MG sublingual tablet    NITROSTAT    25 tablet    Place 1 tablet (0.4 mg) under the tongue every 5 minutes as needed for chest pain       nystatin 698127 UNITS Tabs tablet    MYCOSTATIN     TK 2 TS PO BID       olopatadine 0.1 % ophthalmic solution    PATANOL    5 mL    Place 1 drop into both eyes 2 times daily as needed for allergies.       pravastatin 40 MG tablet    PRAVACHOL    30 tablet    Take 1 tablet (40 mg) by mouth daily       ranitidine 150 MG tablet    ZANTAC    60 tablet    Take 1 tablet (150 mg) by mouth 2 times daily       spironolactone 25 MG tablet    ALDACTONE    60 tablet    Take 2 tablets (50 mg) by mouth daily       * sucralfate 1 GM tablet    CARAFATE    40 tablet    Take 1 tablet (1 g) by mouth 4 times daily as needed       * sucralfate 1 GM tablet    CARAFATE    120 tablet    Take 1 tablet (1 g) by mouth 4 times daily as needed       traMADol 50 MG tablet    ULTRAM    60 tablet    Take 1 tablet (50 mg) by mouth every 8 hours as needed for moderate pain       TUMS 500 MG chewable tablet   Generic drug:  calcium carbonate      Take 3-4 chew tab by mouth daily as needed.       vitamin D 89865 UNIT capsule    ERGOCALCIFEROL    8 capsule    Take 1 capsule (50,000 Units) by mouth every 7 days Need a Vitamin D level in 2 months.       ZOFRAN PO          * Notice:  This list has 6 medication(s) that are the same as other medications prescribed for you. Read the directions carefully, and ask  your doctor or other care provider to review them with you.

## 2017-04-07 NOTE — PATIENT INSTRUCTIONS
Start MTX 4 tab a week   Labs today and MTX monitoring labs on 4/28/2017  F/U with me on 5/5 at 2:30 pm  Restasis eyedrop for dry eyes, please discuss with your eye doctor  CT chest/abdomen/pelvis

## 2017-04-07 NOTE — NURSING NOTE
"Chief Complaint   Patient presents with     RECHECK     UCTD       Initial /60  Pulse 74  Wt 76.2 kg (168 lb)  SpO2 99%  BMI 29.76 kg/m2 Estimated body mass index is 29.76 kg/(m^2) as calculated from the following:    Height as of 1/25/17: 1.6 m (5' 3\").    Weight as of this encounter: 76.2 kg (168 lb).  Medication Reconciliation: incomplete   Patient stated no medication change since last visit.    "

## 2017-04-07 NOTE — LETTER
Patient:  Janine Cornell  :   1965  MRN:     [id number]        Ms.Lisa CHELLE Cornell  [address] [facility] [address]        May 18, 2017    Dear ,    We are writing to inform you of your test results. Positive p-ANCA and MPO (vasculitis marker). Normal TPMT means that it's ok to try imuran but I recommend you to do follow up with Dr. Vernon and have biopsy repeated (if he still recommends it) before start of imura      Resulted Orders   Vasculitis panel   Result Value Ref Range    Myeloperoxidase Antibody IgG 2.9 (H) 0.0 - 0.9 AI      Comment:      Positive   Antibody index (AI) values reflect qualitative changes in antibody   concentration that cannot be directly associated with clinical condition or   disease state.      Proteinase 3 Antibody IgG  0.0 - 0.9 AI     <0.2  Negative   Antibody index (AI) values reflect qualitative changes in antibody   concentration that cannot be directly associated with clinical condition or   disease state.     Antineutrophil cytoplasmic Marline IgG   Result Value Ref Range    Neutrophil Cytoplasmic IgG Antibody (A)      1:80  Reference range: <1:20  (Note)  Atypical perinuclear ANCA (atypical p-ANCA) staining  pattern observed. Presence of p-ANCA ruled out on  formalin-fixed neutrophils. This staining pattern is  associated with inflammatory bowel diseases, particularly  ulcerative colitis. It may also be seen in primary  sclerosis cholangitis.  INTERPRETIVE INFORMATION: Anti-Neutrophil Cyto Ab, IgG  Neutrophil Cytoplasmic Antibodies (C-ANCA = granular  cytoplasmic staining, P-ANCA = perinuclear staining) are  found in the serum of over 90 percent of patients with  certain necrotizing systemic vasculitides, and usually in  less than 5 percent of patients with collagen vascular  disease or arthritis.  Performed by [facility],  [address] [place] [address] [phone]  [email or web address], Ameya Akers MD, Lab. Director     Immunoglobulin G subclasses    Result Value Ref Range     695 - 1620 mg/dL    IgG1 Test sent to [facility]. See ARMISC. 300 - 856 mg/dL    IgG2 Test sent to [facility]. See ARMISC. 158 - 761 mg/dL    IgG3 Test sent to [facility]. See ARMISC. 24 - 192 mg/dL    IgG4 Test sent to [facility]. See ARMISC. 11 - 86 mg/dL   Angiotensin converting enzyme   Result Value Ref Range    Angiotensin Converting Enzyme (L)      <5  Reference range: 9 to 67  Unit: U/L  (Note)  Performed by [facility],  [address], [place] [id number] [phone]  www.[email or web address], Ameya Akers MD, Lab. Director     TPMT Genotype   Result Value Ref Range    Lab Scanned Result TPMT GENOTYPE-Scanned    Vitamin C   Result Value Ref Range    Vitamin C 56       Comment:      Reference range: 23 to 114  Unit: umol/L  (Note)  INTERPRETIVE DATA: Vitamin C (Ascorbic Acid), Plasma  Vitamin C concentrations lower than 11 umol/L indicate  deficiency. Concentrations between 11 and 23 umol/L are  consistent with a moderate risk of deficiency due to  inadequate tissue stores.  Vitamin C concentration is reported as micromoles per liter  (umol/L). To convert concentration to milligrams per  deciliter (mg/dL), multiply the result by 0.0176.  Test developed and characteristics determined by [facility]. See Compliance Statement B: [facility] [employer]/  Performed by [facility],  [address], [place] [id number] [phone]  www.[email or web address], Ameya Akers MD, Lab. Director     [facility] Miscellaneous Test   Result Value Ref Range    Result       SEE NOTE  (Note)  Test name                    Result Flag  Units  RefIntvl  ------------------------------------------------------------  Immunoglobulin G Subclass 1                                 399       mg/dL 240-1118  REFERENCE INTERVAL: Immunoglobulin G Subclass 1  Access complete set of age- and/or gender-specific  reference intervals for this test in the [facility]  Test Directory ([email or web address]).  Immunoglobulin G Subclass 2                                  325       mg/dL 124-549  REFERENCE INTERVAL: Immunoglobulin G Subclass 2  Access complete set of age- and/or gender-specific  reference intervals for this test in the [facility]  Test Directory ([email or web address]).  Immunoglobulin G Subclass 3                                  52       mg/dL   REFERENCE INTERVAL: Immunoglobulin G Subclass 3  Access complete set of age- and/or gender-specific  reference intervals for this test in the [facility]  Test Directory ([email or web address]).  Immunoglobulin G Subclass 4                                   20       mg/dL 1-123  The total IgG (mg/dL) can be derived by the sum of the  subclasses IgG1, IgG2, IgG3 and IgG4 values. However, a  confirmatory and more precise total IgG is available by the  nephelometric method of total IgG (Test # [id number]).  REFERENCE INTERVAL: Immunoglobulin G Subclass 4  Access complete set of age- and/or gender-specific  reference intervals for this test in the [facility]  Test Directory ([email or web address]).  Performed by [facility],  [address] [place] [address] [phone]  www.[email or web address], Ameya Akers MD, Lab. Director      Test Name IGG SUBCLASSES     Send Outs Misc Test Code 74739     Send Outs Misc Test Specimen Serum        Majo Mckinnon MD

## 2017-04-07 NOTE — LETTER
4/7/2017    RE: Janine Cornell  [address] [facility] [id number]     Rheumatology F/U Note    Last visit note: 11/18/2016        Today's visit date: 4/7/2017    Reason for visit: RA, Fibromyalgia     HPI from last visit    Ms. Cornell is a 51 yo AAF who was referred to our clinic for evaluation and management of her joint pain in setting of positive RF 26.    Her joint symptoms first started more than a year ago, but over last 6-8 months fluctuating some good and bad days . All her joints are involved. Over last 5 months, hands became swollen, warm and painful. Tylenol does not help. Ketoprofen did not help. Was told to avoid NSAIDs given ACS. Reports AM/PM stiffness x 2-3 hr, can't make full fist when wakes up in AM.    She feels tired all the time. Reports worsening hair loss and unchanged  facial rash. Gets red sore flaky rash over cheeks across her face which is intermittent diagnosed Rosacea and is prescribed metronidazole gel which she has not started using. Reports occasional oral ulcers. Hands feel cold with red discoloration last winter. Has occasional dysphagia to both solids and liquid. Has dry eyes, is using OTC allergy eyedrops. Has chronic abdominal pain. Has h/o pancreatitis. Sometime has anxiety. Occasionally has numbness, tingling in fingers/toes. Has back and spine issues, her whole back and neck hurts, different areas at different time. She is wondering if she has FMS. Has hard time sleeping, has never been diagnosed with BRENNA. She does not know if he snores. She was found to have slightly positive RF in 2/2013. Has microcytic anemia.     Her arthralgia gets worse with drop in temperature. Reports body ache. Activity makes her pain worse. Her joint swelling isintermittent. Her body pain and joint pain is the same. Reports AM stiffness x 3 hr.    She has been doing acupuncture x few months and it is helping with her pain. She thinks that the combination of plaquenil and acupuncture is  helpful but overall she notice 30% in her symptoms relief.     Takes tylenol and tramadol on occasion for pain.    She decided to use different formulation of metformin which helped with her abdominal pain. I referred her to GI for evaluation of elevated lipase and abdominal pain. She decided to see GI in the future.       She is on  mg qd since 5/2014, tolerates it well and it helps her some. Denies taking any gabapentin due to chest pain and headches. She has had referral her for water aerobics, but she can't begin until she's healed from recent surgery in 10/2014. She thinks HCQ is helping but not enough to control all her pain, reports 2-3 hr of AM stiffness with ongoing diffuse arthralgia/myalgia along with intermittent swelling of hands. She does see her acupuncture person and it helps with her pain, has not started water aerobics yet. Has got her flu shot.       10/2015: Reports having pleuritic CP in 3/2015, was prescribed Lidoderm patches first. It did not help. Took prednisone (?dose) by her own, pain got better but it recurred off prednisone and gradually got worse to the point that she could not stand the pain anymore, was seen in ER in 5/2015, was treated with medrol dose pack which helped. Reports pain over hips, knees, ankles and fingers. Pain gets worse with walking. Reports myalgia, swelling of the arms. Pain over neck, shoulders. Has AM stiffness x 3-4 hr. Fingers swell up and become painful. Can't remember if steroid helped with joint pain. She had headaches, severe CP when she took tramadol and gabapentin and stopped taking them, sx resolved but she can't tell which one caused SEs but thinks that probably it was gabapentin. She has good days and tries to be more active but activity aggravates the pain. Headaches are intermittent. HCQ helps some not enough to control all the pain. No HCQ SEs. Had eye exam around July 2015. Flexeril helps but PCP wants to change flexeril to zanaflex,  reporting that she is NOT comfortable with the change. Acupuncture still helps an she continued to  do that. Concerned about fat pads she has in supraclavicular area, has h/o thyroid nodules. Continues having hair loss, takes iron for iron deficiency.     2/2016: She has 2 major complaints today, increased joint pain/swelling in hands/feet and recent Dx of optic neuritis in R eye, reports having similar problem about 20 yr ago, now recurred. Has a spot in R eye vision x long term, was seen by ophthalmology few days ago and was told to have optic neuritis. Gets joint pain, muscle pain. Can't  objects, hands are swollen. Knees, hips and ankles are painful. Reports more arthralgia. AM stiffness/ pain is 3-4 hr. She wants me to talk to her ophthalmologist directly.      She had botox inj for migraines which did not help her. She is going to have angiogram next wk, had to take more nitro for CP recently.       4/29/2016: She reports being on steroid taper x 3 since last visit. Reportedly, had orbit MRI, it showed swelling/inflamamtion of R lacrimal glands. She had painful swelling of her R eye, cause her headaches. Botox inj made her headaches worse. She is being dealing with this since 1/2016, with severe headaches and pain/swelling around R eye. Had biopsy in 3/2016. She is frustrated as prednisone causes her weight gain and her BG is difficult to control on it.    5/2016: At last visit, was prescribed MTX 10 mg po qwk to take along with FA 1 mg qd. She decided not to take MTX, is afraid of side effects, believes all these medications would harm her. She also believes that botox caused her inflammation around R eye. No major flare up of per-orbital inflamamtion since last visit but continues to have swelling around her R eye.      11/2016: Reports diffuse body ache, arthralgia, myalgia especially with weather change. No major flare up of campos-orbital inflammation but her face/around R eye swells up from time to  time. Reports 2 episodes of CP over past 2 mo, nitro sometimes helps and sometimes does not help. She has asthma and costochondritis and she has hard time to distinguish the origin of pain. Sometimes knees and fingers swell up. Her stiffness is sometimes all day.    4/2017:  Reports having sinusitis since last visit. Her R eye is dry and is using eyedrops to keep it moist. Reports having pain in ears. Was treated with antibiotics by PCP, it got better then it got worse. Reports catching infections easily. Then started having pressure over eyes with pain/headaches (exact start date is unknown). Pain gradually got worse and became persistent. Had sinus CT.     Today: Having a flare up of swelling, pain around her R orbit. Has not tried MTX yet.    Her records were reviewed.        Component      Latest Ref Rng 2/28/2013 2/28/2013          12:14 PM 12:14 PM   WBC      4.0 - 11.0 10e9/L     RBC Count      3.8 - 5.2 10e12/L     Hemoglobin      11.7 - 15.7 g/dL     Hematocrit      35.0 - 47.0 %     MCV      78 - 100 fl     MCH      26.5 - 33.0 pg     MCHC      31.5 - 36.5 g/dL     RDW      10.0 - 15.0 %     Platelet Count      150 - 450 10e9/L     Specimen Description           Lyme Screen IgG and IgM           Vitamin D Deficiency screening      30 - 75 ug/L     Ferritin      10 - 300 ng/mL     Sed Rate      0 - 20 mm/h     ALLI Screen by EIA      <1.0     Rheumatoid Factor      0 - 14 IU/mL     CK Total      30 - 225 U/L  78   Uric Acid      2.5 - 7.5 mg/dL 6.7      Component      Latest Ref Rng 2/28/2013          12:14 PM   WBC      4.0 - 11.0 10e9/L    RBC Count      3.8 - 5.2 10e12/L    Hemoglobin      11.7 - 15.7 g/dL    Hematocrit      35.0 - 47.0 %    MCV      78 - 100 fl    MCH      26.5 - 33.0 pg    MCHC      31.5 - 36.5 g/dL    RDW      10.0 - 15.0 %    Platelet Count      150 - 450 10e9/L    Specimen Description       Serum   Lyme Screen IgG and IgM       Test value: <0.75....Interpretation: Negative....If you  highly suspect Lyme . . .   Vitamin D Deficiency screening      30 - 75 ug/L    Ferritin      10 - 300 ng/mL    Sed Rate      0 - 20 mm/h    ALLI Screen by EIA      <1.0    Rheumatoid Factor      0 - 14 IU/mL    CK Total      30 - 225 U/L    Uric Acid      2.5 - 7.5 mg/dL      Component      Latest Ref Rng 2/28/2013 2/28/2013 2/28/2013 2/28/2013          12:14 PM 12:14 PM 12:14 PM 12:14 PM   WBC      4.0 - 11.0 10e9/L       RBC Count      3.8 - 5.2 10e12/L       Hemoglobin      11.7 - 15.7 g/dL       Hematocrit      35.0 - 47.0 %       MCV      78 - 100 fl       MCH      26.5 - 33.0 pg       MCHC      31.5 - 36.5 g/dL       RDW      10.0 - 15.0 %       Platelet Count      150 - 450 10e9/L       Specimen Description             Lyme Screen IgG and IgM             Vitamin D Deficiency screening      30 - 75 ug/L       Ferritin      10 - 300 ng/mL    10   Sed Rate      0 - 20 mm/h   23 (H)    ALLI Screen by EIA      <1.0  <1.0 . . .     Rheumatoid Factor      0 - 14 IU/mL 26 (H)      CK Total      30 - 225 U/L       Uric Acid      2.5 - 7.5 mg/dL         Component      Latest Ref Rn 2/28/2013 2/28/2013          12:14 PM 12:14 PM   WBC      4.0 - 11.0 10e9/L 14.1 (H)    RBC Count      3.8 - 5.2 10e12/L 4.55    Hemoglobin      11.7 - 15.7 g/dL 10.7 (L)    Hematocrit      35.0 - 47.0 % 33.3 (L)    MCV      78 - 100 fl 73 (L)    MCH      26.5 - 33.0 pg 23.5 (L)    MCHC      31.5 - 36.5 g/dL 32.1    RDW      10.0 - 15.0 % 18.1 (H)    Platelet Count      150 - 450 10e9/L 407    Specimen Description           Lyme Screen IgG and IgM           Vitamin D Deficiency screening      30 - 75 ug/L  32   Ferritin      10 - 300 ng/mL     Sed Rate      0 - 20 mm/h     ALLI Screen by EIA      <1.0     Rheumatoid Factor      0 - 14 IU/mL     CK Total      30 - 225 U/L     Uric Acid      2.5 - 7.5 mg/dL          MRI CERVICAL SPINE WITHOUT CONTRAST 4/3/2013 12:47 PM    HISTORY: Cervicalgia. Degeneration of cervical intervertebral disc.  MRI  cervical spine. Evaluate bilateral supraclavicular lymph nodes.  Clinical enlargement.    TECHNIQUE: Multiplanar multisequence MRI of the cervical spine  without gadolinium contrast.    COMPARISON: None.    FINDINGS: The patient has a developmentally small central canal.  Images of the cervical cord reveals small areas of T2 hyperintensity  within the cervical cord. There is a small area of high signal  intensity at the C1 level. There is also a small area of high signal  intensity at the C6-C7 level. The area of high signal at C6-C7 is  located at an area of central spinal stenosis. This may be due to  myelomalacia. The area of high signal at C1-C2 is indeterminate.    C2-C3: Normal disc, facet joints, spinal canal and neural foramina.    C3-C4: Normal disc, facet joints, spinal canal and neural foramina.    C4-C5: Normal disc, facet joints, spinal canal and neural foramina.    C5-C6: There is degeneration of the disc. There is a mild annular  disc bulge. The central canal is mild-moderately narrowed. The  residual AP diameter of the central spinal canal measures  approximately 9 mm.    C6-C7: There is degeneration of the disc. There is loss of disc space  height. There is a diffuse annular disc bulge. There are associated  posterior osteophytes. There are severe central spinal stenosis at  this level. The residual AP diameter of the central spinal canal is  only about 6 mm. There is significant flattening of the cord. There  is high signal in the cord at this level consistent with  myelomalacia. There is moderate to severe right foraminal stenosis  due to and uncinate spur.    C7-T1: Normal disc, facet joints, spinal canal and neural foramina.            Result Impression       IMPRESSION:  1. Severe central spinal stenosis at C6-C7 due to a developmentally  small canal and due to a bulging disc and associated posterior  osteophyte. There is flattening of the cord at this level and high  signal in the cord at this  level consistent with myelomalacia.  2. There is a small, indeterminate 2-3 mm area of high signal  intensity in the cord at the C1 level. This could be due to gliosis  secondary to a previous infectious or inflammatory process.  Demyelinating disease could also have a similar appearance. Clinical  correlation suggested.    GAIL MORE MD     MRI LEFT UPPER EXTREMITY NON-JOINT WITHOUT CONTRAST, MRI RIGHT UPPER  EXTREMITY NON-JOINT WITHOUT CONTRAST April 3, 2013 1:32 PM    HISTORY: Cervicalgia. Degeneration of cervical intervertebral disc.    TECHNIQUE: Multiplanar, multisequence imaging of the brachial plexus  was performed without gadolinium contrast enhancement.    COMPARISON: None.    FINDINGS: No mass lesions are seen. No inflammatory process is  identified. The roots, trunks, branches, and divisions of both the  right and left brachial plexus appear normal. No adenopathy is seen.  The anterior scalene muscles and the middle scalene muscles appear  normal. Nodules are seen within the thyroid gland. The largest nodule  is seen in the left lobe of the thyroid. This measures about 1.8 cm  in diameter.            Result Impression       IMPRESSION:  1. Both the right and left brachial plexus regions appear normal.  2. Thyroid nodules. The largest nodule is in the left lobe of the  thyroid. It measures about 1.8 cm in diameter.       XR WRIST BILATERAL G/E 3 VIEWS    Narrative:        EXAMINATION:  1. Each hand 3 views  2. Each wrist 3 views     DATE: 5/1/2013     HISTORY: Arthropathy with concern for rheumatoid.     FINDINGS: 3 views of each hand and 3 views of each wrist are obtained  without prior for comparison. Alignment is normal. There is no  fracture or acute bone abnormality. No distinct erosions are seen.  Some spurring of the distal radial ulnar joint is noted on the right.       Impression:     IMPRESSION:  1. No evidence of an inflammatory arthropathy involving either hand  or wrist.     VIELKA GUEVARA MD          XR FOOT BILATERAL G/E 3 VIEWS    Narrative:        EXAMINATION:  1. Each foot 3 views  2. Each ankle 3 views  3. Sacroiliac joint series     DATE: 5/1/2013     HISTORY: Arthropathy; concern for rheumatoid.     FINDINGS: No prior for comparison.     A frontal and bilateral oblique evaluation of the sacroiliac joints  shows no malalignment. Some patchy sclerosis is identified along the  central aspect of both sacroiliac joints, which is favored to be  degenerative. No distinct erosions are seen. The visualized portion  of the hip joint spaces appear maintained, with no erosive changes  identified. Mild endplate spurring is noted in the lower lumbar  spine.     3 views of each foot and 3 views of each ankle show no evidence of  acute fracture or dislocation. The metatarsal phalangeal,  tarsometatarsal and intertarsal joint spaces appear maintained. No  erosions are identified. There is no sign of acroosteolysis. The  ankle mortise and talar dome are intact bilaterally. Minimal spurring  is noted along the tip of the medial malleolus bilaterally.       Impression:     IMPRESSION:  1. Patchy sclerosis identified along the central aspect of both  sacroiliac joints, which is favored to be degenerative. No distinct  erosions are seen.  2. No evidence of an inflammatory arthropathy in either foot or ankle.     VIELKA GUEVARA MD     5/2013  CBC WITH PLATELETS DIFFERENTIAL       Component Value Range    WBC 11.3 (*) 4.0 - 11.0 10e9/L    RBC Count 4.56  3.8 - 5.2 10e12/L    Hemoglobin 11.1 (*) 11.7 - 15.7 g/dL    Hematocrit 34.3 (*) 35.0 - 47.0 %    MCV 75 (*) 78 - 100 fl    MCH 24.3 (*) 26.5 - 33.0 pg    MCHC 32.4  31.5 - 36.5 g/dL    RDW 16.1 (*) 10.0 - 15.0 %    Platelet Count 315  150 - 450 10e9/L    Diff Method Automated Method      % Neutrophils 71.6  40 - 75 %    % Lymphocytes 20.9  20 - 48 %    % Monocytes 4.3  0 - 12 %    % Eosinophils 2.8  0 - 6 %    % Basophils 0.2  0 - 2 %    % Immature Granulocytes 0.2  0 - 0.4  %    Absolute Neutrophil 8.1  1.6 - 8.3 10e9/L    Absolute Lymphocytes 2.4  0.8 - 5.3 10e9/L    Absolute Monoctyes 0.5  0.0 - 1.3 10e9/L    Absolute Eosinophils 0.3  0.0 - 0.7 10e9/L    Absolute Basophils 0.0  0.0 - 0.2 10e9/L    Abs Immature Granulocytes 0.0  0 - 0.03 10e9/L   AMYLASE       Component Value Range    Amylase 103  30 - 110 U/L   COMPREHENSIVE METABOLIC PANEL       Component Value Range    Sodium 144  133 - 144 mmol/L    Potassium 3.8  3.4 - 5.3 mmol/L    Chloride 105  94 - 109 mmol/L    Carbon Dioxide 23  20 - 32 mmol/L    Anion Gap 17  6 - 17 mmol/L    Glucose 173 (*) 60 - 99 mg/dL    Urea Nitrogen 13  5 - 24 mg/dL    Creatinine 0.83  0.52 - 1.04 mg/dL    GFR Estimate 74  >60 mL/min/1.7m2    GFR Estimate If Black 90  >60 mL/min/1.7m2    Calcium 9.7  8.5 - 10.4 mg/dL    Bilirubin Total 0.4  0.2 - 1.3 mg/dL    Albumin 4.3  3.9 - 5.1 g/dL    Protein Total 7.8  6.8 - 8.8 g/dL    Alkaline Phosphatase 66  40 - 150 U/L    ALT 36  0 - 50 U/L    AST 28  0 - 45 U/L   CK TOTAL       Component Value Range    CK Total 66  30 - 225 U/L   CRP INFLAMMATION       Component Value Range    CRP Inflammation 10.4 (*) 0.0 - 8.0 mg/L   LIPASE       Component Value Range    Lipase 353 (*) 20 - 250 U/L   ERYTHROCYTE SEDIMENTATION RATE AUTO       Component Value Range    Sed Rate 26 (*) 0 - 20 mm/h   ROUTINE UA WITH MICROSCOPIC REFLEX TO CULTURE       Component Value Range    Color Urine Yellow      Appearance Urine Slightly Cloudy      Glucose Urine 30 (*) NEG mg/dL    Bilirubin Urine Negative  NEG    Ketones Urine 5 (*) NEG mg/dL    Specific Gravity Urine 1.026  1.003 - 1.035    Blood Urine Trace (*) NEG    pH Urine 5.0  5.0 - 7.0 pH    Protein Albumin Urine 10 (*) NEG mg/dL    Urobilinogen mg/dL Normal  0.0 - 2.0 mg/dL    Nitrite Urine Negative  NEG    Leukocyte Esterase Urine Negative  NEG    Source Midstream Urine      WBC Urine 1  0 - 2 /HPF    RBC Urine 4 (*) 0 - 2 /HPF    Squamous Epithelial /HPF Urine <1  0 - 1 /HPF     Mucous Urine Present (*) NEG /LPF    Hyaline Casts 3 (*) 0 - 2 /LPF    Calcium Oxalate Moderate (*) NEG /HPF   COMPLEMENT C3       Component Value Range    Complement C3 143  76 - 169 mg/dL   COMPLEMENT C4       Component Value Range    Complement C4 31  15 - 50 mg/dL   HEPATITIS C ANTIBODY       Component Value Range    Hepatitis C Antibody Negative  NEG   CARDIOLIPIN ANTIBODY IGG AND IGM       Component Value Range    Cardiolipin IgG Marline    0 - 15.0 GPL    Value: <15.0      Interpretation:  Negative    Cardiolipin IgM Marline    0 - 12.5 MPL    Value: <12.5      Interpretation:  Negative   LUPUS PANEL       Component Value Range    Lupus Result    NEG    Value: Negative      (Note)      COMMENTS:      The INR is normal.      APTT is normal.  1:2 Mix is not indicated.      DRVVT Screen is normal.      Thrombin time is normal.      NEGATIVE TEST; A LUPUS ANTICOAGULANT WAS NOT DETECTED IN THIS      SPECIMEN WITHIN THE LIMITS OF THE TESTING REPERTOIRE.      If the clinical picture is strongly suggestive of an antiphospholipid      syndrome, recommend anticardiolipin and beta-2-glycoprotein (IgG and      IgM) antibody tests.      Leela Franks M.D.  [phone]      5/2/2013            INR =  0.93 N = 0.86-1.14  (No additional charge)      TT = 15.7 N = 13.0-19.0 sec  (No additional charge)            APTT'S:    Seconds      Reagent =  Stago LA      Normal  =  38      Patient  =  35      1:2 Mix  =  N/A      Reference =  31-43             DILUTE MADELINE VIPER VENOM TEST:      DRVVT Screen Ratio = 0.76 Normal = <1.21         IMMUNOGLOBULIN G SUBCLASSES       Component Value Range    IGG 1030  695 - 1620 mg/dL    IgG1 488  300 - 856 mg/dL    IgG2 325  158 - 761 mg/dL    IgG3 47  24 - 192 mg/dL    IgG4 18  11 - 86 mg/dL   SUNNY ANTIBODY PANEL       Component Value Range    Ribonucleic Protein IgG Antibody 0      Smith Antibody IgG 1      SSA (RO) Antibody IgG 4      SSB (LA) Antibody IgG 0      Scleroderma Antibody  IgG 0     BETA 2 GLYCOPROTEIN ANTIBODIES IGG IGM       Component Value Range    Beta-2-Glycopro IgG 1      Beta-2-Glycopro IgM 5     CYCLIC CIT PEPT IGG       Component Value Range    Cyclic Cit Pept IgG/IgA    <20 UNITS    Value: <20      Interpretation:  Negative   DNA DOUBLE STRANDED ANTIBODIES       Component Value Range    DNA-ds    0 - 29 IU/mL    Value: <15      Interpretation:  Negative       Component      Latest Ref Rng 3/11/2014   Sodium      133 - 144 mmol/L 137   Potassium      3.4 - 5.3 mmol/L 4.6   Chloride      94 - 109 mmol/L 97   Carbon Dioxide      20 - 32 mmol/L 20   Anion Gap      6 - 17 mmol/L 20 (H)   Glucose      60 - 99 mg/dL 243 (H)   Urea Nitrogen      5 - 24 mg/dL 35 (H)   Creatinine      0.52 - 1.04 mg/dL 1.47 (H)   GFR Estimate      >60 mL/min/1.7m2 38 (L)   GFR Estimate If Black      >60 mL/min/1.7m2 46 (L)   Calcium      8.5 - 10.4 mg/dL 9.7   Bilirubin Total      0.2 - 1.3 mg/dL 0.3   Albumin      3.9 - 5.1 g/dL 4.8   Protein Total      6.8 - 8.8 g/dL 7.9   Alkaline Phosphatase      40 - 150 U/L 73   ALT      0 - 50 U/L 35   AST      0 - 45 U/L 30   Color Urine       Yellow   Appearance Urine       Clear   Glucose Urine      NEG mg/dL 500 (A)   Bilirubin Urine      NEG Negative   Ketones Urine      NEG mg/dL Negative   Specific Gravity Urine      1.003 - 1.035 1.020   Blood Urine      NEG Negative   pH Urine      5.0 - 7.0 pH 5.5   Protein Albumin Urine      NEG mg/dL Negative   Urobilinogen Urine      0.2 - 1.0 EU/dL 0.2   Nitrite Urine      NEG Negative   Leukocyte Esterase Urine      NEG Negative   Source       Midstream Urine   WBC      4.0 - 11.0 10e9/L 14.0 (H)   RBC Count      3.8 - 5.2 10e12/L 5.02   Hemoglobin      11.7 - 15.7 g/dL 12.4   Hematocrit      35.0 - 47.0 % 37.1   MCV      78 - 100 fl 74 (L)   MCH      26.5 - 33.0 pg 24.7 (L)   MCHC      31.5 - 36.5 g/dL 33.4   RDW      10.0 - 15.0 % 15.3 (H)   Platelet Count      150 - 450 10e9/L 420       Component      Latest  Ref Rng 3/25/2014   Sodium      133 - 144 mmol/L 137   Potassium      3.4 - 5.3 mmol/L 3.7   Chloride      94 - 109 mmol/L 100   Carbon Dioxide      20 - 32 mmol/L 21   Anion Gap      6 - 17 mmol/L 16   Glucose      60 - 99 mg/dL 214 (H)   Urea Nitrogen      5 - 24 mg/dL 20   Creatinine      0.52 - 1.04 mg/dL 1.02   GFR Estimate      >60 mL/min/1.7m2 58 (L)   GFR Estimate If Black      >60 mL/min/1.7m2 70   Calcium      8.5 - 10.4 mg/dL 9.5   Phosphorus      2.5 - 4.5 mg/dL 3.7   Albumin      3.9 - 5.1 g/dL 4.6     Component      Latest Ref Rng 4/30/2014   CRP Inflammation      0.0 - 8.0 mg/L 10.0 (H)   Sed Rate      0 - 20 mm/h 39 (H)   Rheumatoid Factor      <20 IU/mL <20   Glucose-6-PO4 Dehydrogenase       17.7     Component      Latest Ref Rng 6/11/2014 7/15/2014   WBC      4.0 - 11.0 10e9/L 11.0    RBC Count      3.8 - 5.2 10e12/L 4.74    Hemoglobin      11.7 - 15.7 g/dL 11.8    Hematocrit      35.0 - 47.0 % 36.1    MCV      78 - 100 fl 76 (L)    MCH      26.5 - 33.0 pg 24.9 (L)    MCHC      31.5 - 36.5 g/dL 32.7    RDW      10.0 - 15.0 % 13.9    Platelet Count      150 - 450 10e9/L 434    Diff Method       Automated Method    % Neutrophils       59.2    % Lymphocytes       31.6    % Monocytes       5.9    % Eosinophils       2.6    % Basophils       0.5    % Immature Granulocytes       0.2    Absolute Neutrophil      1.6 - 8.3 10e9/L 6.5    Absolute Lymphocytes      0.8 - 5.3 10e9/L 3.5    Absolute Monoctyes      0.0 - 1.3 10e9/L 0.7    Absolute Eosinophils      0.0 - 0.7 10e9/L 0.3    Absolute Basophils      0.0 - 0.2 10e9/L 0.1    Abs Immature Granulocytes      0 - 0.4 10e9/L 0.0    Sodium      133 - 144 mmol/L 138 140   Potassium      3.4 - 5.3 mmol/L 3.9 3.8   Chloride      94 - 109 mmol/L 97 103   Carbon Dioxide      20 - 32 mmol/L 26 22   Anion Gap      6 - 17 mmol/L 14.9 15   Glucose      60 - 99 mg/dL 111 (H) 163 (H)   Urea Nitrogen      5 - 24 mg/dL 28 (H) 15   Creatinine      0.52 - 1.04 mg/dL 1.10  (H) 0.99   GFR Estimate      >60 mL/min/1.7m2 53 (L) 60 (L)   GFR Estimate If Black      >60 mL/min/1.7m2 64 72   Calcium      8.5 - 10.4 mg/dL 10.3 9.3   Bilirubin Total      0.2 - 1.3 mg/dL 0.6 0.2   Albumin      3.9 - 5.1 g/dL 5.1 4.2   Protein Total      6.8 - 8.8 g/dL 9.0 (H) 7.2   Alkaline Phosphatase      40 - 150 U/L 87 69   ALT      0 - 50 U/L 37 33   AST      0 - 45 U/L 40 26   Color Urine       Straw    Appearance Urine       Clear    Glucose Urine      NEG mg/dL Negative    Bilirubin Urine      NEG Negative    Ketones Urine      NEG mg/dL Negative    Specific Gravity Urine      1.003 - 1.035 1.005    Blood Urine      NEG Negative    pH Urine      5.0 - 7.0 pH 5.0    Protein Albumin Urine      NEG mg/dL Negative    Urobilinogen mg/dL      0.0 - 2.0 mg/dL Normal    Nitrite Urine      NEG Negative    Leukocyte Esterase Urine      NEG Negative    Source       Midstream Urine    Lipase      20 - 250 U/L 403 (H)    CRP Inflammation      0.0 - 8.0 mg/L <5.0    N-Terminal Pro Bnp      0 - 125 pg/mL  55   Hemoglobin A1C      4.3 - 6.0 %  7.0 (H)     Component      Latest Ref Rn 11/12/2014   Sodium      133 - 144 mmol/L 135   Potassium      3.4 - 5.3 mmol/L 3.8   Chloride      94 - 109 mmol/L 104   Carbon Dioxide      20 - 32 mmol/L 24   Anion Gap      3 - 14 mmol/L 7   Glucose      70 - 99 mg/dL 125 (H)   Urea Nitrogen      7 - 30 mg/dL 17   Creatinine      0.52 - 1.04 mg/dL 1.18 (H)   GFR Estimate      >60 mL/min/1.7m2 49 (L)   GFR Estimate If Black      >60 mL/min/1.7m2 59 (L)   Calcium      8.5 - 10.1 mg/dL 9.8   WBC      4.0 - 11.0 10e9/L 11.1 (H)   RBC Count      3.8 - 5.2 10e12/L 4.05   Hemoglobin      11.7 - 15.7 g/dL 9.8 (L)   Hematocrit      35.0 - 47.0 % 30.6 (L)   MCV      78 - 100 fl 76 (L)   MCH      26.5 - 33.0 pg 24.2 (L)   MCHC      31.5 - 36.5 g/dL 32.0   RDW      10.0 - 15.0 % 15.5 (H)   Platelet Count      150 - 450 10e9/L 484 (H)   CT CHEST PULMONARY EMBOLISM W CONTRAST 5/20/2015 4:57  PM  HISTORY: Pain. SOB. Elevated d-dimer.   TECHNIQUE: 65 mL Isovue 370. Axial images with coronal  reconstructions.  COMPARISON: None.  FINDINGS: Calcified granuloma with surrounding scarring in the  posterolateral left upper lobe. Triangular shaped opacity at the right  lung base in the lateral right middle lobe could represent some  scarring, atelectasis or infiltrate. There is also some scarring or  atelectasis in the posteromedial right lung base. Lungs otherwise  clear.  The pulmonary arteries are well opacified. No CT evidence for acute  pulmonary embolus. No aortic dissection.  Diffuse fatty infiltration of the liver.  IMPRESSION  IMPRESSION:   1. No pulmonary embolus identified.  2. Small focus of atelectasis, infiltrate or scarring in the lateral  right middle lobe.  3. Old granulomatous disease.  4. Otherwise negative.  SILVERIO VAZQUEZ MD  Component      Latest Ref Rng 3/27/2015   WBC      4.0 - 11.0 10e9/L 11.8 (H)   RBC Count      3.8 - 5.2 10e12/L 4.53   Hemoglobin      11.7 - 15.7 g/dL 11.0 (L)   Hematocrit      35.0 - 47.0 % 33.8 (L)   MCV      78 - 100 fl 75 (L)   MCH      26.5 - 33.0 pg 24.3 (L)   MCHC      31.5 - 36.5 g/dL 32.5   RDW      10.0 - 15.0 % 15.4 (H)   Platelet Count      150 - 450 10e9/L 419   Diff Method       Automated Method   % Neutrophils       75.3   % Lymphocytes       17.4   % Monocytes       4.4   % Eosinophils       2.5   % Basophils       0.2   % Immature Granulocytes       0.2   Absolute Neutrophil      1.6 - 8.3 10e9/L 8.9 (H)   Absolute Lymphocytes      0.8 - 5.3 10e9/L 2.1   Absolute Monoctyes      0.0 - 1.3 10e9/L 0.5   Absolute Eosinophils      0.0 - 0.7 10e9/L 0.3   Absolute Basophils      0.0 - 0.2 10e9/L 0.0   Abs Immature Granulocytes      0 - 0.4 10e9/L 0.0   Creatinine      0.52 - 1.04 mg/dL 1.12 (H)   GFR Estimate      >60 mL/min/1.7m2 52 (L)   GFR Estimate If Black      >60 mL/min/1.7m2 63   Iron      35 - 180 ug/dL 25 (L)   Iron Binding Cap      240 - 430 ug/dL  346   Iron Saturation Index      15 - 46 % 7 (L)   Albumin      3.4 - 5.0 g/dL 4.0   ALT      0 - 50 U/L 24   AST      0 - 45 U/L 15   CRP Inflammation      0.0 - 8.0 mg/L 28.0 (H)   Sed Rate      0 - 20 mm/h 81 (H)   Rheumatoid Factor      <20 IU/mL <20   Vitamin D Deficiency screening      30 - 75 ug/L 44   Ferritin      8 - 252 ng/mL 34     Component      Latest Ref Rng 7/29/2015   Sodium      133 - 144 mmol/L 137   Potassium      3.4 - 5.3 mmol/L 3.8   Chloride      94 - 109 mmol/L 106   Carbon Dioxide      20 - 32 mmol/L 23   Anion Gap      3 - 14 mmol/L 8   Glucose      70 - 99 mg/dL 148 (H)   Urea Nitrogen      7 - 30 mg/dL 17   Creatinine      0.52 - 1.04 mg/dL 1.02   GFR Estimate      >60 mL/min/1.7m2 58 (L)   GFR Estimate If Black      >60 mL/min/1.7m2 70   Calcium      8.5 - 10.1 mg/dL 9.0   Bilirubin Total      0.2 - 1.3 mg/dL 0.5   Albumin      3.4 - 5.0 g/dL 4.2   Protein Total      6.8 - 8.8 g/dL 7.4   Alkaline Phosphatase      40 - 150 U/L 64   ALT      0 - 50 U/L 23   AST      0 - 45 U/L 19     Results for FAVIO MARTINEZ (MRN [id number]) as of 10/30/2015 17:00   Ref. Range 8/21/2012 09:06 5/22/2013 14:22 4/14/2014 12:06 9/11/2014 12:35 12/4/2014 16:38   TSH Latest Range: 0.40-4.00 mU/L 0.83 0.43 0.27 (L) 0.23 (L) 0.22 (L)     Component      Latest Ref Rng 10/30/2015   WBC      4.0 - 11.0 10e9/L 13.4 (H)   RBC Count      3.8 - 5.2 10e12/L 4.76   Hemoglobin      11.7 - 15.7 g/dL 12.4   Hematocrit      35.0 - 47.0 % 37.9   MCV      78 - 100 fl 80   MCH      26.5 - 33.0 pg 26.1 (L)   MCHC      31.5 - 36.5 g/dL 32.7   RDW      10.0 - 15.0 % 14.5   Platelet Count      150 - 450 10e9/L 324   Diff Method       Automated Method   % Neutrophils       67.7   % Lymphocytes       22.7   % Monocytes       6.3   % Eosinophils       2.7   % Basophils       0.4   % Immature Granulocytes       0.2   Absolute Neutrophil      1.6 - 8.3 10e9/L 9.1 (H)   Absolute Lymphocytes      0.8 - 5.3 10e9/L 3.1   Absolute  Monoctyes      0.0 - 1.3 10e9/L 0.9   Absolute Eosinophils      0.0 - 0.7 10e9/L 0.4   Absolute Basophils      0.0 - 0.2 10e9/L 0.1   Abs Immature Granulocytes      0 - 0.4 10e9/L 0.0   Creatinine      0.52 - 1.04 mg/dL 1.21 (H)   GFR Estimate      >60 mL/min/1.7m2 47 (L)   GFR Estimate If Black      >60 mL/min/1.7m2 57 (L)   Iron      35 - 180 ug/dL 70   Iron Binding Cap      240 - 430 ug/dL 428   Iron Saturation Index      15 - 46 % 16   Albumin      3.4 - 5.0 g/dL 4.6   ALT      0 - 50 U/L 29   AST      0 - 45 U/L 21   CRP Inflammation      0.0 - 8.0 mg/L <2.9   Sed Rate      0 - 20 mm/h 8   Vitamin D Deficiency screening      20 - 75 ug/L 46   Ferritin      8 - 252 ng/mL 18   TSH      0.40 - 4.00 mU/L 0.49   T4 Free      0.76 - 1.46 ng/dL 1.06   Free T3      2.3 - 4.2 pg/mL 2.8     Component      Latest Ref Rng 11/17/2015   Testosterone Total      8 - 60 ng/dL 19   Sex Hormone Binding Globulin      30 - 135 nmol/L 32   Free Testosterone Calculated      0.11 - 0.58 ng/dL 0.34   Vitamin A       0.61   Retinol Palmitate       <0.02 . . .   Vitamin A Interp       Normal . . .   Thyroglobulin Antibody      <40 IU/mL <20   Thyroid Peroxidase Antibody      <35 IU/mL 31   DHEA Sulfate      35 - 430 ug/dL 101   Zinc       68     Component      Latest Ref Rng 1/27/2016   Sodium      133 - 144 mmol/L 135   Potassium      3.4 - 5.3 mmol/L 4.0   Chloride      94 - 109 mmol/L 104   Carbon Dioxide      20 - 32 mmol/L 24   Anion Gap      3 - 14 mmol/L 7   Glucose      70 - 99 mg/dL 88   Urea Nitrogen      7 - 30 mg/dL 20   Creatinine      0.52 - 1.04 mg/dL 1.13 (H)   GFR Estimate      >60 mL/min/1.7m2 51 (L)   GFR Estimate If Black      >60 mL/min/1.7m2 62   Calcium      8.5 - 10.1 mg/dL 9.4   Bilirubin Total      0.2 - 1.3 mg/dL 0.7   Albumin      3.4 - 5.0 g/dL 4.3   Protein Total      6.8 - 8.8 g/dL 7.7   Alkaline Phosphatase      40 - 150 U/L 66   ALT      0 - 50 U/L 24   AST      0 - 45 U/L 18   Cholesterol      <200 mg/dL  "112   Triglycerides      <150 mg/dL 100   HDL Cholesterol      >49 mg/dL 34 (L)   LDL Cholesterol Calculated      <100 mg/dL 58   Non HDL Cholesterol      <130 mg/dL 78   N-Terminal Pro Bnp      0 - 125 pg/mL 29   Hemoglobin A1C      4.3 - 6.0 % 7.0 (H)   Amylase      30 - 110 U/L 60   Lipase      73 - 393 U/L 394 (H)   Troponin I ES      0.000 - 0.045 ug/L <0.015 . . .     Component      Latest Ref Rng 2/24/2016   Angiotensin Converting Enzyme       <5 (L) . . .   Neutrophil Cytoplasmic IgG Antibody       <1:20 . . .   Sed Rate      0 - 20 mm/h 86 (H)     Copath Report      Patient Name: FAVIO MARTINEZ   MR#: [id number]   Specimen #: [id number]   Collected: 3/15/2016   Received: 3/15/2016   Reported: 3/17/2016 13:33   Ordering Phy(s): PARVEEN ENRIQUEZ     ORIGINAL REPORT FOLLOWS ADDENDUM  ADDENDUM     TO ORIGINAL REPORT   Status: Signed Out   Date Ordered:3/18/2016   Date Complete:3/18/2016   Date Reported:3/18/2016 12:06   Signed Out By: Marianne Godfrey MD     INTERPRETATION:   This addendum is done to incorporate the results of fungal (GMS) stains   done on the specimen.  Specimen is negative for fungal organisms.  The   original final diagnosis remains unchanged.     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  Negative for malignancy(Please   see microscopic description)     Electronically signed out by:     Marianne Godfrey MD     CLINICAL HISTORY:   right orbital mass     GROSS:   The specimen is received labeled \"right orbital biopsy\" and consists of   red-tan nodule measuring 1.5 x 0.9 x 0.6 cm with one smooth side and   opposite rough side consistent with periosteum.  The specimen is   bisected revealing homogenous pale tan fleshy cut surface.  Touch   preparations are prepared, air " dried and fixed, portion of specimen is   submitted in RPMI for possible lymphoma workup [facility],   ([employer], [place], [place] ).  The remainder is entirely submitted.   (Dictated by: Marianne Godfrey MD 3/15/2016 04:45 PM)     MICROSCOPIC:     Specimen consists of portion of lacrimal gland with acute and chronic   inflammation.  Focal area of microabscess formation associated with   small areas of necrosis are also present.  Inflammation is seen   extending to the periorbital adipose tissue forming panniculitis.   Specimen is negative for malignancy.  Samples sent for immunophenotyping   to [facility], ([employer], [place], [place] ) reveals no evidence   of monoclonality or aberrant antigen expression.  A GMS (fungal) stain   is pending and results will be reported as an addendum.     CPT Codes:   A: 99171-AM9, 81828-BBFDI, SOH, 00861-TDVFG, 63387-RHVJ     TESTING LAB LOCATION:   [address]  [address] [phone]     COLLECTION SITE:   Client[facility]   [facility]            Component      Latest Ref Rng 4/29/2016   Nucleated RBCs      0 /100 0   Absolute Neutrophil      1.6 - 8.3 10e9/L 8.9 (H)   Absolute Lymphocytes      0.8 - 5.3 10e9/L 3.2   Absolute Monocytes      0.0 - 1.3 10e9/L 0.8   Absolute Eosinophils      0.0 - 0.7 10e9/L 0.2   Absolute Basophils      0.0 - 0.2 10e9/L 0.1   Abs Immature Granulocytes      0 - 0.4 10e9/L 0.1   Absolute Nucleated RBC       0.0   IGG      695 - 1620 mg/dL 836   IgG1      300 - 856 mg/dL 280 (L)   IgG2      158 - 761 mg/dL 277   IgG3      24 - 192 mg/dL 35   IgG4      11 - 86 mg/dL 16   RNP Antibody IgG      0.0 - 0.9 AI <0.2 . . .   Smith SUNNY Antibody IgG      0.0 - 0.9 AI <0.2 . . .   SSA (Ro) (SUNNY) Antibody, IgG      0.0 - 0.9 AI <0.2 . . .   SSB (La) (SUNNY) Antibody, IgG      0.0 - 0.9 AI <0.2 . . .   Scleroderma Antibody Scl-70 SUNNY IgG      0.0 - 0.9 AI <0.2 . . .    Cholesterol      <200 mg/dL 154   Triglycerides      <150 mg/dL 220 (H)   HDL Cholesterol      >49 mg/dL 42 (L)   LDL Cholesterol Calculated      <100 mg/dL 67   Non HDL Cholesterol      <130 mg/dL 111   M Tuberculosis Result      NEG Negative   M Tuberculosis Antigen Value       0.00   Albumin      3.4 - 5.0 g/dL 4.3   ALT      0 - 50 U/L 30   AST      0 - 45 U/L 10   Complement C3      76 - 169 mg/dL 157   Complement C4      15 - 50 mg/dL 32   CRP Inflammation      0.0 - 8.0 mg/L <2.9   Sed Rate      0 - 20 mm/h 2   DNA-ds      <10 IU/mL 1   Cyclic Citrullinated Peptide Antibody, IgG      <7 U/mL 1   Rheumatoid Factor      <20 IU/mL <20   Proteinase 3 Antibody IgG      0.0 - 0.9 AI <0.2 . . .   Myeloperoxidase Antibody IgG      0.0 - 0.9 AI 2.5 (H)   Vitamin D Deficiency screening      20 - 75 ug/L 24   Hemoglobin A1C      4.3 - 6.0 % 7.9 (H)   ALLI by IFA IgG       1:40 (A) . . .     ROS:  A comprehensive ROS was done, positives are per HPI.        HISTORY REVIEW:  Past Medical History:   Diagnosis Date     Abnormal glandular Papanicolaou smear of cervix 1992     Allergic rhinitis     Allergy, airborne subst     Arthritis      ASCVD (arteriosclerotic cardiovascular disease)      Chronic pain      Chronic pancreatitis (H)     idiopathic, Tx: PPI, antioxidants, pancreatic enzymes     Common migraine      Coronary artery disease      Costochondritis      Costochondritis      Difficulty in walking(719.7)      Ectasia, mammary duct     [facility], persistent nipple discharge     Elevated fasting glucose      Gastro-oesophageal reflux disease      Hernia      Hyperlipidemia LDL goal < 100      Hypertension goal BP (blood pressure) < 140/90     Essential hypertension     Iron deficiency anemia      Ischemic cardiomyopathy      Menorrhagia      Migraine headaches      Mild persistent asthma      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 Matias, neg w/u, Rx'd w/prednisone     Ventral hernia, unspecified, without mention of obstruction or gangrene 2012     Past Surgical History:   Procedure Laterality Date     C ECHO HEART XTHORACIC,COMPLETE, W/O DOPPLER  04    [facility] WNL, EF 60%     C/SECTION, LOW TRANSVERSE           CARDIAC SURGERY      cardiac stent- recent in 16; 4 other stents     DILATION AND CURETTAGE N/A 2016    Procedure: DILATION AND CURETTAGE;  Surgeon: Nahed Butler MD;  Location: [facility] OR     HC UGI ENDOSCOPY W EUS  08    Dr. Pastrana, possible chronic pancreatitis, fatty liver     HERNIA REPAIR  2012    done at [facility] [place]     INSERT INTRAUTERINE DEVICE N/A 2016    Procedure: INSERT INTRAUTERINE DEVICE;  Surgeon: Nahed Butler MD;  Location: [facility] OR     INT UTERINE FIBRIOD EMBOLIZATION  10/29/2014     LAPAROSCOPIC CHOLECYSTECTOMY  08    Dr. Arnol GRUBBS TX, CERVICAL      s/p LEEP     ORBITOTOMY Right 3/15/2016    Procedure: ORBITOTOMY;  Surgeon: Myron Cyr MD;  Location: [facility]     UPPER GI ENDOSCOPY  10/21/08    mild gastritis, Dr. Hidalgo     Family History   Problem Relation Age of Onset     HEART DISEASE Brother 15     MI at 15, 16.      HEART DISEASE Father 50     heart attack     CEREBROVASCULAR DISEASE Father      DIABETES Father      Hypertension Father      Depression Father       ALCON.A.CHELLE. Father      DIABETES Maternal Uncle      Hypertension Mother      Arthritis Mother      HEART DISEASE Mother      long qt syndrome     Thyroid Disease Mother      C.A.D. Mother      Hypertension Maternal Aunt      Hypertension Maternal Uncle      Arthritis Brother      he passed away, had severe arthritis at age 11     Arthritis Maternal Uncle      Eye Disorder Maternal Uncle      cataracts     Neurologic Disorder Sister      migraines     Neurologic Disorder Sister      migraines     Respiratory Son      asthma     CEREBROVASCULAR DISEASE Maternal Uncle      C.A.D. Brother      Family History Negative       neg for RA, SLE     Unknown/Adopted No family hx of      unknown neurological issues in her family, mother was adopted     Skin Cancer No family hx of      No known family hx of skin cancer     Social History     Social History     Marital status: Single     Spouse name: N/A     Number of children: 1     Years of education: N/A     Occupational History      None      Social History Main Topics     Smoking status: Former Smoker     Packs/day: 0.20     Years: 1.00     Types: Cigarettes     Quit date: 2/1/2016     Smokeless tobacco: Never Used     Alcohol use No     Drug use: No     Sexual activity: Not Currently     Other Topics Concern     Parent/Sibling W/ Cabg, Mi Or Angioplasty Before 65f 55m? Yes     Social History Narrative    Balanced Diet - sometimes    Osteoporosis Prevention Measures - Dairy servings per day: 2 servings weekly    Regular Exercise -  Yes Describe walking 4 times a week    Dental Exam - NO    Seatbelts used - Yes    Self Breast Exam - Yes    Abuse: Current or Past (Physical, Sexual or Emotional)- No    Do you have any concerns about STD's -  No    Do you feel safe in your environment - No    Guns stored in the home - No    Sunscreen used - Yes    Lipids -  YES - Date: 053102    Glucose -  YES - Date: 012804    Eye Exam - YES - Date: one year ago    Colon Cancer Screening - No     Hemoccults - NO    Pap Test -  YES - Date: , remote history of LEEP    Mammography - YES - Date: last spring, would like to discuss, needs a referral [facility]    DEXA - NO    Immunizations reviewed and up to date - Yes, last td given in  or      Patient Active Problem List   Diagnosis     Seasonal affective disorder (H)     Allergic rhinitis     PCOS (polycystic ovarian syndrome)     Moderate persistent asthma     Chronic pancreatitis (H)     Hypertension goal BP (blood pressure) < 140/90     Common migraine     NSTEMI (non-ST elevated myocardial infarction) (H)     Hyperlipidemia LDL goal <70     ASCVD (arteriosclerotic cardiovascular disease)     GERD (gastroesophageal reflux disease)     Ischemic cardiomyopathy     Hypertensive heart disease     Uterine leiomyoma     Iron deficiency anemia     Costochondritis     Vitamin D deficiency     Breast cancer screening     Spinal stenosis in cervical region     Fibromyalgia     Seronegative rheumatoid arthritis (H)     Type 2 diabetes, HbA1C goal < 8% (H)     Type 2 diabetes mellitus with other specified complication (H)     Hyperlipidemia associated with type 2 diabetes mellitus (H)     Hypertension associated with diabetes (H)     Overweight with body mass index (BMI) 25.0-29.9     Tobacco use disorder     Telogen effluvium     CAD S/P percutaneous coronary angioplasty     Status post coronary angiogram       Pregnancy Hx: She is . All misscarriages were in first trimester. H/o OC use in the past. 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    PMHx, FHx, SHx were reviewed, unchanged.      Outpatient Encounter Prescriptions as of 2017   Medication Sig Dispense Refill     lidocaine (XYLOCAINE) 5 % ointment Apply 1 g topically 2 times daily as needed for moderate pain 50 g 5     Ketoprofen POWD Apply 1 g topically 2 times daily as needed 100 g 5     hydrALAZINE (APRESOLINE) 25 MG tablet Take 0.5 tablets (12.5 mg)  by mouth daily . May take additional one-half tablet once daily if systolic blood pressure >140 mmHg. 90 tablet 3     traMADol (ULTRAM) 50 MG tablet Take 1 tablet (50 mg) by mouth every 8 hours as needed for moderate pain 60 tablet 3     folic acid (FOLVITE) 1 MG tablet 1 tab daily 90 tablet 2     fluconazole (DIFLUCAN) 100 MG tablet Take 100 mg by mouth daily as needed       hydroxychloroquine (PLAQUENIL) 200 MG tablet Take 2 tablets (400 mg) by mouth daily EYE EXAM DUE/LETTER SENT 180 tablet 0     insulin glargine (BASAGLAR KWIKPEN) 100 UNIT/ML injection Inject 40 Units Subcutaneous daily 9 mL 3     ranitidine (ZANTAC) 150 MG tablet Take 1 tablet (150 mg) by mouth 2 times daily 60 tablet 2     ferrous gluconate (FERGON) 324 (38 FE) MG tablet Take 1 tablet (324 mg) by mouth 2 times daily 180 tablet 1     ASPIRIN LOW DOSE 81 MG EC tablet TAKE 1 TABLET BY MOUTH EVERY DAY 90 tablet 3     blood glucose monitoring (ONE TOUCH DELICA) lancets Use to test blood sugars 4 times daily or as directed. 4 Box 3     vitamin D (ERGOCALCIFEROL) 77349 UNIT capsule Take 1 capsule (50,000 Units) by mouth every 7 days Need a Vitamin D level in 2 months. (Patient taking differently: Take 50,000 Units by mouth every 7 days Need a Vitamin D level in 2 months.) 8 capsule 0     metoprolol (TOPROL-XL) 100 MG 24 hr tablet Take 1 tablet (100 mg) by mouth daily 90 tablet 3     clopidogrel (PLAVIX) 75 MG tablet Take 1 tablet (75 mg) by mouth daily 90 tablet 3     lisinopril-hydrochlorothiazide (PRINZIDE/ZESTORETIC) 20-25 MG per tablet TAKE 1 TABLET BY MOUTH DAILY 90 tablet 3     blood glucose monitoring (NO BRAND SPECIFIED) meter device kit Use to test blood sugar 4 X times daily or as directed. 1 kit 0     blood glucose monitoring (NO BRAND SPECIFIED) test strip 1 strip by In Vitro route 4 times daily Test as directed. Please dispense three months and three months of refills. Thank you. 360 each 3     diphenhydrAMINE (BENADRYL) 25 MG capsule TK  1 TO 2 CS PO QHS  4     EPIPEN 2-RIKY 0.3 MG/0.3ML injection INJECT 0.3 MG INTO THE MUSCLE PRF ANAPHYALAXIS  0     AEROSPAN 80 MCG/ACT AERS INHALE 2 PUFFS PO BID.  RINSE MOUTH AFTERWARDS  4     fluticasone (FLONASE) 50 MCG/ACT spray U 2 SPRAYS IEN D.  3     Magnesium Oxide -Mg Supplement 250 MG TABS TK 1 T PO BID. REDUCE IF STOOLS LOOSEN  11     nystatin (MYCOSTATIN) 021434 UNITS TABS tablet TK 2 TS PO BID  0     albuterol (2.5 MG/3ML) 0.083% neb solution INL 1 VIAL VIA NEBULIZATION Q 4 TO 6 HOURS PRN  1     ASPIRIN LOW DOSE 81 MG EC tablet TK 1 T PO D  3     dicyclomine (BENTYL) 20 MG tablet Take 1 tablet (20 mg) by mouth 4 times daily as needed 60 tablet 5     sucralfate (CARAFATE) 1 GM tablet Take 1 tablet (1 g) by mouth 4 times daily as needed 120 tablet 3     azelastine (ASTELIN) 0.1 % spray Spray 2 sprays into both nostrils 2 times daily 1 Bottle 3     fluocinolone (SYNALAR) 0.01 % solution Apply topically daily as needed       mometasone (NASONEX) 50 MCG/ACT spray Spray 2 sprays into both nostrils daily       cyclobenzaprine (FLEXERIL) 10 MG tablet Take 1 tablet (10 mg) by mouth 2 times daily as needed for muscle spasms 180 tablet 0     Ondansetron HCl (ZOFRAN PO)        pravastatin (PRAVACHOL) 40 MG tablet Take 1 tablet (40 mg) by mouth daily 30 tablet 11     spironolactone (ALDACTONE) 25 MG tablet Take 2 tablets (50 mg) by mouth daily 60 tablet 11     metFORMIN (GLUCOPHAGE-XR) 500 MG 24 hr tablet Take 2 tablets (1,000 mg) by mouth daily (with dinner) 60 tablet 11     montelukast (SINGULAIR) 10 MG tablet Take 1 tablet (10 mg) by mouth At Bedtime 30 tablet 1     ESOMEPRAZOLE MAGNESIUM PO Take 20 mg by mouth 2 times daily (before meals)       insulin pen needle 32G X 4 MM Use 1 daily as directed. 300 each 3     acetaminophen (TYLENOL) 325 MG tablet Take 1-2 tablets (325-650 mg) by mouth every 6 hours as needed for pain (headache) 250 tablet 0     metroNIDAZOLE (NORITATE) 1 % cream Apply topically daily        desonide (DESOWEN) 0.05 % cream Apply topically 2 times daily       ketoconazole (NIZORAL) 2 % shampoo Apply topically daily as needed       fluocinonide (LIDEX) 0.05 % external solution Apply topically 2 times daily To areas of itch on the scalp as needed. 60 mL 11     sucralfate (CARAFATE) 1 GM tablet Take 1 tablet (1 g) by mouth 4 times daily as needed 40 tablet 1     nitroglycerin (NITROSTAT) 0.4 MG SL tablet Place 1 tablet (0.4 mg) under the tongue every 5 minutes as needed for chest pain 25 tablet 1     albuterol (PROAIR HFA, PROVENTIL HFA, VENTOLIN HFA) 108 (90 BASE) MCG/ACT inhaler Inhale 2 puffs into the lungs every 6 hours as needed for shortness of breath / dyspnea or wheezing 3 Inhaler 1     calcium carbonate (TUMS) 500 MG chewable tablet Take 3-4 chew tab by mouth daily as needed.       fexofenadine (ALLEGRA) 180 MG tablet Take 1 tablet by mouth daily as needed. 90 tablet 3     olopatadine (PATANOL) 0.1 % ophthalmic solution Place 1 drop into both eyes 2 times daily as needed for allergies. 5 mL 12     No facility-administered encounter medications on file as of 4/7/2017.        Allergies   Allergen Reactions     Amoxicillin-Pot Clavulanate      Augmentin      Unknown possible hives and edema     Azithromycin      Diatrizoate Other (See Comments)     Pt wants this listed because she is allergic to shellfish      Imitrex [Sumatriptan]      Severe face/neck/chest tightness and flushing side effects      Penicillins Hives     Unknown      Pork Allergy      Stomach pain, cramping, diarrhea, itching, nausea and headaches     Shellfish Allergy Hives and Swelling     Sulfa Drugs Hives and Swelling     Zithromax [Azithromycin Dihydrate] Swelling     Unknown          Ph.E:    Vitals:    04/07/17 1317   BP: 101/60   Pulse: 74   SpO2: 99%   Weight: 76.2 kg (168 lb)           Constitutional: WD/WN. Pleasant. In no acute distress. Obese.  Eyes: Swelling around R eye worsened since last visit, EOMI  HEENT: No oral  ulcers or thrush. Normal salivary pool.  Neck: No cervical LAP, + thyromegaly  Chest: Clear to auscultation bilaterally  CV: RRR, no murmurs/ rubs or gallops. No edema, clubbing or cyanosis.   GI: Abdomen is soft and non tender.  MS: No synovitis or joint tenderness. Cool joints. No joint deformities. Full ROM of the joints. No nodules. +Fibromyalgia trigger points.  Skin: No livedo, periungual erythema, digital ulcers or nail changes. + alopecia with scales, facial malar erythema (looks like seborrhoic dermatitis).  Neuro: A&O x 3. Grossly non focal, muscular power 5/5 in all ext  Psych: NL affect and mood    Assessment/ plan:    1-Seropositive non-erosive RA (arthritis, AM stiffness, high CRP, RF 26 but re-check neg 3/2015 on HCQ) Dx 5/2013, FMS, new pleuritic CP 3/20-15-5/2015 resolved on steroids. She is on  mg bid since 5/2014. She tolerates it well and it's helping some but not enough to control all her pain. Continues having flare ups of joint pain, swelling also has FMS. Joint sx are getting worse. Had high ESR/CRP in 3/2015 and new pleuritic CP between 3/2015-5/2015 which resolved after taking medrol dose pack prescribed 5/20/2015. Her pleuritic CP could be related to her RA. At last visit, recommended a trial of MTX 10 mg po qwk to be added to her HCQ; however after discussing risks she decided to hold off as she liked to re-try tramadol (believes it was gabapentin causing her SEs not tramadol) and to try flexeril at bid dose prn. Then stopped tramadol as it blames it for recent episodes of CP.    In the past, MTX was recommended, she declined.    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 Etiology of mass unclear, but it's inflammatory and related to pt's underlying autoimmune disease (inflammatory arthritis, serositis). It is  very possible that this is related to ANCA vasculitis causing orbit pseudotumor given +MPO.    Her work up showed borderline + ALLI and slightly elevated MPO. I told her prednisone is not good for her diabetes and weight gain. Recommended a trial of MTX as next step. Discussed risks on details. I called her neuro-ophthalmologist at last visit who agreed with trial of MTX (she suspects pseudo-sarcoidosis or sarcoid like disease but no granuloma and ACE not elevated).     Unfortunately despite all my efforts to explain risks vs benefits (more than risks) of MTX as steroid sparing gent, Janine declined to try MTX. I was very concerned about her R orbital inflammatory mass as there is high chance of flare up off steroids and steroid causes her weigh gain and uncontrolled diabetes. I even offered her other steroid sparing gents like imuran. She declined that as well.    Her inflammation around R orbit has recurred now. Again I spent quite amount of time discussing a trial of MTX. After discussing risks/benefits, she agreed to try it. If fails or does not respond to MTX, will try AZA, will check TPMT.    Start MTX 4 tab a week along with daily FA 1 mg a day  Labs today and MTX monitoring labs on 4/28/2017  CT chest/abdomen/pelvis      Continue accupuncture.     My impression is that her arthralgias are a combination of IA, fibromyalgia and chronic pain.  She does not think HCQ is beneficial and wants to stop, OK to stop but resume if arthralgia gets worse then would need annual eye exam    2-Fad pads. She was seen by endocrinology and cushing was ruled out in 4/2014. Was advised to do f/u for enlarged thyroid/thyroid nodules.    3-Hair loss. F/U with dermatology    4-Chronic pain. F/U with pain clinic    5-Concerns of subclinical hyperthyroidism: TSH is barely minimal, chart review shows that those lowest levels are since April 2014. At last visit, discussed Endocrinology ref which pt wants to hold off in this visit.      6-Vit D deficiency. Was replaced with vit D 50, 000 units po qwk x 12 wk then 2000 units qd    PLAN: Follow up in May 2017    MEDICATION CHANGES: as above      Orders Placed This Encounter   Procedures     ALT     Albumin level     AST     CBC with platelets differential     Creatinine     CRP inflammation     Erythrocyte sedimentation rate auto     Vasculitis panel     Antineutrophil cytoplasmic Marline IgG     Immunoglobulin G subclasses     Angiotensin converting enzyme     Albumin level     ALT     AST     CBC with platelets differential     Creatinine     TPMT Genotype     Vitamin C     [facility] Miscellaneous Test     Negative sarcoidosis test Repeat vasculitis markers are positive.    Majo Mckinnon MD

## 2017-04-07 NOTE — LETTER
Patient:  Janine Cornell  :   1965  MRN:     [id number]        Ms.Lisa CHELLE Cornell  [address] [facility] [id number]        2017    Dear ,    We are writing to inform you of your test results. Inflammation markers are normal.      Resulted Orders   ALT   Result Value Ref Range    ALT 23 0 - 50 U/L   Albumin level   Result Value Ref Range    Albumin 4.3 3.4 - 5.0 g/dL   AST   Result Value Ref Range    AST 18 0 - 45 U/L   CBC with platelets differential   Result Value Ref Range    WBC 11.3 (H) 4.0 - 11.0 10e9/L    RBC Count 4.77 3.8 - 5.2 10e12/L    Hemoglobin 12.5 11.7 - 15.7 g/dL    Hematocrit 38.7 35.0 - 47.0 %    MCV 81 78 - 100 fl    MCH 26.2 (L) 26.5 - 33.0 pg    MCHC 32.3 31.5 - 36.5 g/dL    RDW 13.2 10.0 - 15.0 %    Platelet Count 347 150 - 450 10e9/L    Diff Method Automated Method     % Neutrophils 67.1 %    % Lymphocytes 22.9 %    % Monocytes 6.2 %    % Eosinophils 3.0 %    % Basophils 0.4 %    % Immature Granulocytes 0.4 %    Nucleated RBCs 0 0 /100    Absolute Neutrophil 7.5 1.6 - 8.3 10e9/L    Absolute Lymphocytes 2.6 0.8 - 5.3 10e9/L    Absolute Monocytes 0.7 0.0 - 1.3 10e9/L    Absolute Eosinophils 0.3 0.0 - 0.7 10e9/L    Absolute Basophils 0.1 0.0 - 0.2 10e9/L    Abs Immature Granulocytes 0.1 0 - 0.4 10e9/L    Absolute Nucleated RBC 0.0    Creatinine   Result Value Ref Range    Creatinine 1.20 (H) 0.52 - 1.04 mg/dL    GFR Estimate 47 (L) >60 mL/min/1.7m2      Comment:      Non  GFR Calc    GFR Estimate If Black 57 (L) >60 mL/min/1.7m2      Comment:       GFR Calc   CRP inflammation   Result Value Ref Range    CRP Inflammation 3.7 0.0 - 8.0 mg/L   Erythrocyte sedimentation rate auto   Result Value Ref Range    Sed Rate 17 0 - 30 mm/h       Parastoo Pratibha CARRINGTON

## 2017-04-07 NOTE — PROGRESS NOTES
Rheumatology F/U Note    Last visit note: 11/18/2016        Today's visit date: 4/7/2017    Reason for visit: RA, Fibromyalgia     HPI from last visit    Ms. Cornell is a 51 yo AAF who was referred to our clinic for evaluation and management of her joint pain in setting of positive RF 26.    Her joint symptoms first started more than a year ago, but over last 6-8 months fluctuating some good and bad days . All her joints are involved. Over last 5 months, hands became swollen, warm and painful. Tylenol does not help. Ketoprofen did not help. Was told to avoid NSAIDs given ACS. Reports AM/PM stiffness x 2-3 hr, can't make full fist when wakes up in AM.    She feels tired all the time. Reports worsening hair loss and unchanged  facial rash. Gets red sore flaky rash over cheeks across her face which is intermittent diagnosed Rosacea and is prescribed metronidazole gel which she has not started using. Reports occasional oral ulcers. Hands feel cold with red discoloration last winter. Has occasional dysphagia to both solids and liquid. Has dry eyes, is using OTC allergy eyedrops. Has chronic abdominal pain. Has h/o pancreatitis. Sometime has anxiety. Occasionally has numbness, tingling in fingers/toes. Has back and spine issues, her whole back and neck hurts, different areas at different time. She is wondering if she has FMS. Has hard time sleeping, has never been diagnosed with BRENNA. She does not know if he snores. She was found to have slightly positive RF in 2/2013. Has microcytic anemia.     Her arthralgia gets worse with drop in temperature. Reports body ache. Activity makes her pain worse. Her joint swelling isintermittent. Her body pain and joint pain is the same. Reports AM stiffness x 3 hr.    She has been doing acupuncture x few months and it is helping with her pain. She thinks that the combination of plaquenil and acupuncture is helpful but overall she notice 30% in her symptoms relief.     Takes tylenol and  tramadol on occasion for pain.    She decided to use different formulation of metformin which helped with her abdominal pain. I referred her to GI for evaluation of elevated lipase and abdominal pain. She decided to see GI in the future.       She is on  mg qd since 5/2014, tolerates it well and it helps her some. Denies taking any gabapentin due to chest pain and headches. She has had referral her for water aerobics, but she can't begin until she's healed from recent surgery in 10/2014. She thinks HCQ is helping but not enough to control all her pain, reports 2-3 hr of AM stiffness with ongoing diffuse arthralgia/myalgia along with intermittent swelling of hands. She does see her acupuncture person and it helps with her pain, has not started water aerobics yet. Has got her flu shot.       10/2015: Reports having pleuritic CP in 3/2015, was prescribed Lidoderm patches first. It did not help. Took prednisone (?dose) by her own, pain got better but it recurred off prednisone and gradually got worse to the point that she could not stand the pain anymore, was seen in ER in 5/2015, was treated with medrol dose pack which helped. Reports pain over hips, knees, ankles and fingers. Pain gets worse with walking. Reports myalgia, swelling of the arms. Pain over neck, shoulders. Has AM stiffness x 3-4 hr. Fingers swell up and become painful. Can't remember if steroid helped with joint pain. She had headaches, severe CP when she took tramadol and gabapentin and stopped taking them, sx resolved but she can't tell which one caused SEs but thinks that probably it was gabapentin. She has good days and tries to be more active but activity aggravates the pain. Headaches are intermittent. HCQ helps some not enough to control all the pain. No HCQ SEs. Had eye exam around July 2015. Flexeril helps but PCP wants to change flexeril to zanaflex, reporting that she is NOT comfortable with the change. Acupuncture still helps an she  continued to  do that. Concerned about fat pads she has in supraclavicular area, has h/o thyroid nodules. Continues having hair loss, takes iron for iron deficiency.     2/2016: She has 2 major complaints today, increased joint pain/swelling in hands/feet and recent Dx of optic neuritis in R eye, reports having similar problem about 20 yr ago, now recurred. Has a spot in R eye vision x long term, was seen by ophthalmology few days ago and was told to have optic neuritis. Gets joint pain, muscle pain. Can't  objects, hands are swollen. Knees, hips and ankles are painful. Reports more arthralgia. AM stiffness/ pain is 3-4 hr. She wants me to talk to her ophthalmologist directly.      She had botox inj for migraines which did not help her. She is going to have angiogram next wk, had to take more nitro for CP recently.       4/29/2016: She reports being on steroid taper x 3 since last visit. Reportedly, had orbit MRI, it showed swelling/inflamamtion of R lacrimal glands. She had painful swelling of her R eye, cause her headaches. Botox inj made her headaches worse. She is being dealing with this since 1/2016, with severe headaches and pain/swelling around R eye. Had biopsy in 3/2016. She is frustrated as prednisone causes her weight gain and her BG is difficult to control on it.    5/2016: At last visit, was prescribed MTX 10 mg po qwk to take along with FA 1 mg qd. She decided not to take MTX, is afraid of side effects, believes all these medications would harm her. She also believes that botox caused her inflammation around R eye. No major flare up of per-orbital inflamamtion since last visit but continues to have swelling around her R eye.      11/2016: Reports diffuse body ache, arthralgia, myalgia especially with weather change. No major flare up of campos-orbital inflammation but her face/around R eye swells up from time to time. Reports 2 episodes of CP over past 2 mo, nitro sometimes helps and sometimes does  not help. She has asthma and costochondritis and she has hard time to distinguish the origin of pain. Sometimes knees and fingers swell up. Her stiffness is sometimes all day.    4/2017:  Reports having sinusitis since last visit. Her R eye is dry and is using eyedrops to keep it moist. Reports having pain in ears. Was treated with antibiotics by PCP, it got better then it got worse. Reports catching infections easily. Then started having pressure over eyes with pain/headaches (exact start date is unknown). Pain gradually got worse and became persistent. Had sinus CT.     Today: Having a flare up of swelling, pain around her R orbit. Has not tried MTX yet.    Her records were reviewed.        Component      Latest Ref Rng 2/28/2013 2/28/2013          12:14 PM 12:14 PM   WBC      4.0 - 11.0 10e9/L     RBC Count      3.8 - 5.2 10e12/L     Hemoglobin      11.7 - 15.7 g/dL     Hematocrit      35.0 - 47.0 %     MCV      78 - 100 fl     MCH      26.5 - 33.0 pg     MCHC      31.5 - 36.5 g/dL     RDW      10.0 - 15.0 %     Platelet Count      150 - 450 10e9/L     Specimen Description           Lyme Screen IgG and IgM           Vitamin D Deficiency screening      30 - 75 ug/L     Ferritin      10 - 300 ng/mL     Sed Rate      0 - 20 mm/h     ALLI Screen by EIA      <1.0     Rheumatoid Factor      0 - 14 IU/mL     CK Total      30 - 225 U/L  78   Uric Acid      2.5 - 7.5 mg/dL 6.7      Component      Latest Ref Rng 2/28/2013          12:14 PM   WBC      4.0 - 11.0 10e9/L    RBC Count      3.8 - 5.2 10e12/L    Hemoglobin      11.7 - 15.7 g/dL    Hematocrit      35.0 - 47.0 %    MCV      78 - 100 fl    MCH      26.5 - 33.0 pg    MCHC      31.5 - 36.5 g/dL    RDW      10.0 - 15.0 %    Platelet Count      150 - 450 10e9/L    Specimen Description       Serum   Lyme Screen IgG and IgM       Test value: <0.75....Interpretation: Negative....If you highly suspect Lyme . . .   Vitamin D Deficiency screening      30 - 75 ug/L     Ferritin      10 - 300 ng/mL    Sed Rate      0 - 20 mm/h    ALLI Screen by EIA      <1.0    Rheumatoid Factor      0 - 14 IU/mL    CK Total      30 - 225 U/L    Uric Acid      2.5 - 7.5 mg/dL      Component      Latest Ref Rng 2/28/2013 2/28/2013 2/28/2013 2/28/2013          12:14 PM 12:14 PM 12:14 PM 12:14 PM   WBC      4.0 - 11.0 10e9/L       RBC Count      3.8 - 5.2 10e12/L       Hemoglobin      11.7 - 15.7 g/dL       Hematocrit      35.0 - 47.0 %       MCV      78 - 100 fl       MCH      26.5 - 33.0 pg       MCHC      31.5 - 36.5 g/dL       RDW      10.0 - 15.0 %       Platelet Count      150 - 450 10e9/L       Specimen Description             Lyme Screen IgG and IgM             Vitamin D Deficiency screening      30 - 75 ug/L       Ferritin      10 - 300 ng/mL    10   Sed Rate      0 - 20 mm/h   23 (H)    ALLI Screen by EIA      <1.0  <1.0 . . .     Rheumatoid Factor      0 - 14 IU/mL 26 (H)      CK Total      30 - 225 U/L       Uric Acid      2.5 - 7.5 mg/dL         Component      Latest Ref Rn 2/28/2013 2/28/2013          12:14 PM 12:14 PM   WBC      4.0 - 11.0 10e9/L 14.1 (H)    RBC Count      3.8 - 5.2 10e12/L 4.55    Hemoglobin      11.7 - 15.7 g/dL 10.7 (L)    Hematocrit      35.0 - 47.0 % 33.3 (L)    MCV      78 - 100 fl 73 (L)    MCH      26.5 - 33.0 pg 23.5 (L)    MCHC      31.5 - 36.5 g/dL 32.1    RDW      10.0 - 15.0 % 18.1 (H)    Platelet Count      150 - 450 10e9/L 407    Specimen Description           Lyme Screen IgG and IgM           Vitamin D Deficiency screening      30 - 75 ug/L  32   Ferritin      10 - 300 ng/mL     Sed Rate      0 - 20 mm/h     ALLI Screen by EIA      <1.0     Rheumatoid Factor      0 - 14 IU/mL     CK Total      30 - 225 U/L     Uric Acid      2.5 - 7.5 mg/dL          MRI CERVICAL SPINE WITHOUT CONTRAST 4/3/2013 12:47 PM    HISTORY: Cervicalgia. Degeneration of cervical intervertebral disc.  MRI cervical spine. Evaluate bilateral supraclavicular lymph nodes.  Clinical  enlargement.    TECHNIQUE: Multiplanar multisequence MRI of the cervical spine  without gadolinium contrast.    COMPARISON: None.    FINDINGS: The patient has a developmentally small central canal.  Images of the cervical cord reveals small areas of T2 hyperintensity  within the cervical cord. There is a small area of high signal  intensity at the C1 level. There is also a small area of high signal  intensity at the C6-C7 level. The area of high signal at C6-C7 is  located at an area of central spinal stenosis. This may be due to  myelomalacia. The area of high signal at C1-C2 is indeterminate.    C2-C3: Normal disc, facet joints, spinal canal and neural foramina.    C3-C4: Normal disc, facet joints, spinal canal and neural foramina.    C4-C5: Normal disc, facet joints, spinal canal and neural foramina.    C5-C6: There is degeneration of the disc. There is a mild annular  disc bulge. The central canal is mild-moderately narrowed. The  residual AP diameter of the central spinal canal measures  approximately 9 mm.    C6-C7: There is degeneration of the disc. There is loss of disc space  height. There is a diffuse annular disc bulge. There are associated  posterior osteophytes. There are severe central spinal stenosis at  this level. The residual AP diameter of the central spinal canal is  only about 6 mm. There is significant flattening of the cord. There  is high signal in the cord at this level consistent with  myelomalacia. There is moderate to severe right foraminal stenosis  due to and uncinate spur.    C7-T1: Normal disc, facet joints, spinal canal and neural foramina.            Result Impression       IMPRESSION:  1. Severe central spinal stenosis at C6-C7 due to a developmentally  small canal and due to a bulging disc and associated posterior  osteophyte. There is flattening of the cord at this level and high  signal in the cord at this level consistent with myelomalacia.  2. There is a small, indeterminate  2-3 mm area of high signal  intensity in the cord at the C1 level. This could be due to gliosis  secondary to a previous infectious or inflammatory process.  Demyelinating disease could also have a similar appearance. Clinical  correlation suggested.    GAIL MORE MD     MRI LEFT UPPER EXTREMITY NON-JOINT WITHOUT CONTRAST, MRI RIGHT UPPER  EXTREMITY NON-JOINT WITHOUT CONTRAST April 3, 2013 1:32 PM    HISTORY: Cervicalgia. Degeneration of cervical intervertebral disc.    TECHNIQUE: Multiplanar, multisequence imaging of the brachial plexus  was performed without gadolinium contrast enhancement.    COMPARISON: None.    FINDINGS: No mass lesions are seen. No inflammatory process is  identified. The roots, trunks, branches, and divisions of both the  right and left brachial plexus appear normal. No adenopathy is seen.  The anterior scalene muscles and the middle scalene muscles appear  normal. Nodules are seen within the thyroid gland. The largest nodule  is seen in the left lobe of the thyroid. This measures about 1.8 cm  in diameter.            Result Impression       IMPRESSION:  1. Both the right and left brachial plexus regions appear normal.  2. Thyroid nodules. The largest nodule is in the left lobe of the  thyroid. It measures about 1.8 cm in diameter.       XR WRIST BILATERAL G/E 3 VIEWS    Narrative:        EXAMINATION:  1. Each hand 3 views  2. Each wrist 3 views     DATE: 5/1/2013     HISTORY: Arthropathy with concern for rheumatoid.     FINDINGS: 3 views of each hand and 3 views of each wrist are obtained  without prior for comparison. Alignment is normal. There is no  fracture or acute bone abnormality. No distinct erosions are seen.  Some spurring of the distal radial ulnar joint is noted on the right.       Impression:     IMPRESSION:  1. No evidence of an inflammatory arthropathy involving either hand  or wrist.     VIELKA GUEVARA MD         XR FOOT BILATERAL G/E 3 VIEWS    Narrative:        EXAMINATION:  1.  Each foot 3 views  2. Each ankle 3 views  3. Sacroiliac joint series     DATE: 5/1/2013     HISTORY: Arthropathy; concern for rheumatoid.     FINDINGS: No prior for comparison.     A frontal and bilateral oblique evaluation of the sacroiliac joints  shows no malalignment. Some patchy sclerosis is identified along the  central aspect of both sacroiliac joints, which is favored to be  degenerative. No distinct erosions are seen. The visualized portion  of the hip joint spaces appear maintained, with no erosive changes  identified. Mild endplate spurring is noted in the lower lumbar  spine.     3 views of each foot and 3 views of each ankle show no evidence of  acute fracture or dislocation. The metatarsal phalangeal,  tarsometatarsal and intertarsal joint spaces appear maintained. No  erosions are identified. There is no sign of acroosteolysis. The  ankle mortise and talar dome are intact bilaterally. Minimal spurring  is noted along the tip of the medial malleolus bilaterally.       Impression:     IMPRESSION:  1. Patchy sclerosis identified along the central aspect of both  sacroiliac joints, which is favored to be degenerative. No distinct  erosions are seen.  2. No evidence of an inflammatory arthropathy in either foot or ankle.     VIELKA GUEVARA MD     5/2013  CBC WITH PLATELETS DIFFERENTIAL       Component Value Range    WBC 11.3 (*) 4.0 - 11.0 10e9/L    RBC Count 4.56  3.8 - 5.2 10e12/L    Hemoglobin 11.1 (*) 11.7 - 15.7 g/dL    Hematocrit 34.3 (*) 35.0 - 47.0 %    MCV 75 (*) 78 - 100 fl    MCH 24.3 (*) 26.5 - 33.0 pg    MCHC 32.4  31.5 - 36.5 g/dL    RDW 16.1 (*) 10.0 - 15.0 %    Platelet Count 315  150 - 450 10e9/L    Diff Method Automated Method      % Neutrophils 71.6  40 - 75 %    % Lymphocytes 20.9  20 - 48 %    % Monocytes 4.3  0 - 12 %    % Eosinophils 2.8  0 - 6 %    % Basophils 0.2  0 - 2 %    % Immature Granulocytes 0.2  0 - 0.4 %    Absolute Neutrophil 8.1  1.6 - 8.3 10e9/L    Absolute Lymphocytes 2.4   0.8 - 5.3 10e9/L    Absolute Monoctyes 0.5  0.0 - 1.3 10e9/L    Absolute Eosinophils 0.3  0.0 - 0.7 10e9/L    Absolute Basophils 0.0  0.0 - 0.2 10e9/L    Abs Immature Granulocytes 0.0  0 - 0.03 10e9/L   AMYLASE       Component Value Range    Amylase 103  30 - 110 U/L   COMPREHENSIVE METABOLIC PANEL       Component Value Range    Sodium 144  133 - 144 mmol/L    Potassium 3.8  3.4 - 5.3 mmol/L    Chloride 105  94 - 109 mmol/L    Carbon Dioxide 23  20 - 32 mmol/L    Anion Gap 17  6 - 17 mmol/L    Glucose 173 (*) 60 - 99 mg/dL    Urea Nitrogen 13  5 - 24 mg/dL    Creatinine 0.83  0.52 - 1.04 mg/dL    GFR Estimate 74  >60 mL/min/1.7m2    GFR Estimate If Black 90  >60 mL/min/1.7m2    Calcium 9.7  8.5 - 10.4 mg/dL    Bilirubin Total 0.4  0.2 - 1.3 mg/dL    Albumin 4.3  3.9 - 5.1 g/dL    Protein Total 7.8  6.8 - 8.8 g/dL    Alkaline Phosphatase 66  40 - 150 U/L    ALT 36  0 - 50 U/L    AST 28  0 - 45 U/L   CK TOTAL       Component Value Range    CK Total 66  30 - 225 U/L   CRP INFLAMMATION       Component Value Range    CRP Inflammation 10.4 (*) 0.0 - 8.0 mg/L   LIPASE       Component Value Range    Lipase 353 (*) 20 - 250 U/L   ERYTHROCYTE SEDIMENTATION RATE AUTO       Component Value Range    Sed Rate 26 (*) 0 - 20 mm/h   ROUTINE UA WITH MICROSCOPIC REFLEX TO CULTURE       Component Value Range    Color Urine Yellow      Appearance Urine Slightly Cloudy      Glucose Urine 30 (*) NEG mg/dL    Bilirubin Urine Negative  NEG    Ketones Urine 5 (*) NEG mg/dL    Specific Gravity Urine 1.026  1.003 - 1.035    Blood Urine Trace (*) NEG    pH Urine 5.0  5.0 - 7.0 pH    Protein Albumin Urine 10 (*) NEG mg/dL    Urobilinogen mg/dL Normal  0.0 - 2.0 mg/dL    Nitrite Urine Negative  NEG    Leukocyte Esterase Urine Negative  NEG    Source Midstream Urine      WBC Urine 1  0 - 2 /HPF    RBC Urine 4 (*) 0 - 2 /HPF    Squamous Epithelial /HPF Urine <1  0 - 1 /HPF    Mucous Urine Present (*) NEG /LPF    Hyaline Casts 3 (*) 0 - 2 /LPF     Calcium Oxalate Moderate (*) NEG /HPF   COMPLEMENT C3       Component Value Range    Complement C3 143  76 - 169 mg/dL   COMPLEMENT C4       Component Value Range    Complement C4 31  15 - 50 mg/dL   HEPATITIS C ANTIBODY       Component Value Range    Hepatitis C Antibody Negative  NEG   CARDIOLIPIN ANTIBODY IGG AND IGM       Component Value Range    Cardiolipin IgG Marline    0 - 15.0 GPL    Value: <15.0      Interpretation:  Negative    Cardiolipin IgM Marline    0 - 12.5 MPL    Value: <12.5      Interpretation:  Negative   LUPUS PANEL       Component Value Range    Lupus Result    NEG    Value: Negative      (Note)      COMMENTS:      The INR is normal.      APTT is normal.  1:2 Mix is not indicated.      DRVVT Screen is normal.      Thrombin time is normal.      NEGATIVE TEST; A LUPUS ANTICOAGULANT WAS NOT DETECTED IN THIS      SPECIMEN WITHIN THE LIMITS OF THE TESTING REPERTOIRE.      If the clinical picture is strongly suggestive of an antiphospholipid      syndrome, recommend anticardiolipin and beta-2-glycoprotein (IgG and      IgM) antibody tests.      Leela Franks M.D.  [phone]      5/2/2013            INR =  0.93 N = 0.86-1.14  (No additional charge)      TT = 15.7 N = 13.0-19.0 sec  (No additional charge)            APTT'S:    Seconds      Reagent =  Stago LA      Normal  =  38      Patient  =  34      1:2 Mix  =  N/A      Reference =  31-43             DILUTE MADELINE VIPER VENOM TEST:      DRVVT Screen Ratio = 0.76 Normal = <1.21         IMMUNOGLOBULIN G SUBCLASSES       Component Value Range    IGG 1030  695 - 1620 mg/dL    IgG1 488  300 - 856 mg/dL    IgG2 325  158 - 761 mg/dL    IgG3 47  24 - 192 mg/dL    IgG4 18  11 - 86 mg/dL   SUNNY ANTIBODY PANEL       Component Value Range    Ribonucleic Protein IgG Antibody 0      Smith Antibody IgG 1      SSA (RO) Antibody IgG 4      SSB (LA) Antibody IgG 0      Scleroderma Antibody IgG 0     BETA 2 GLYCOPROTEIN ANTIBODIES IGG IGM       Component Value  Range    Beta-2-Glycopro IgG 1      Beta-2-Glycopro IgM 5     CYCLIC CIT PEPT IGG       Component Value Range    Cyclic Cit Pept IgG/IgA    <20 UNITS    Value: <20      Interpretation:  Negative   DNA DOUBLE STRANDED ANTIBODIES       Component Value Range    DNA-ds    0 - 29 IU/mL    Value: <15      Interpretation:  Negative       Component      Latest Ref Rng 3/11/2014   Sodium      133 - 144 mmol/L 137   Potassium      3.4 - 5.3 mmol/L 4.6   Chloride      94 - 109 mmol/L 97   Carbon Dioxide      20 - 32 mmol/L 20   Anion Gap      6 - 17 mmol/L 20 (H)   Glucose      60 - 99 mg/dL 243 (H)   Urea Nitrogen      5 - 24 mg/dL 35 (H)   Creatinine      0.52 - 1.04 mg/dL 1.47 (H)   GFR Estimate      >60 mL/min/1.7m2 38 (L)   GFR Estimate If Black      >60 mL/min/1.7m2 46 (L)   Calcium      8.5 - 10.4 mg/dL 9.7   Bilirubin Total      0.2 - 1.3 mg/dL 0.3   Albumin      3.9 - 5.1 g/dL 4.8   Protein Total      6.8 - 8.8 g/dL 7.9   Alkaline Phosphatase      40 - 150 U/L 73   ALT      0 - 50 U/L 35   AST      0 - 45 U/L 30   Color Urine       Yellow   Appearance Urine       Clear   Glucose Urine      NEG mg/dL 500 (A)   Bilirubin Urine      NEG Negative   Ketones Urine      NEG mg/dL Negative   Specific Gravity Urine      1.003 - 1.035 1.020   Blood Urine      NEG Negative   pH Urine      5.0 - 7.0 pH 5.5   Protein Albumin Urine      NEG mg/dL Negative   Urobilinogen Urine      0.2 - 1.0 EU/dL 0.2   Nitrite Urine      NEG Negative   Leukocyte Esterase Urine      NEG Negative   Source       Midstream Urine   WBC      4.0 - 11.0 10e9/L 14.0 (H)   RBC Count      3.8 - 5.2 10e12/L 5.02   Hemoglobin      11.7 - 15.7 g/dL 12.4   Hematocrit      35.0 - 47.0 % 37.1   MCV      78 - 100 fl 74 (L)   MCH      26.5 - 33.0 pg 24.7 (L)   MCHC      31.5 - 36.5 g/dL 33.4   RDW      10.0 - 15.0 % 15.3 (H)   Platelet Count      150 - 450 10e9/L 420       Component      Latest Ref Rng 3/25/2014   Sodium      133 - 144 mmol/L 137   Potassium       3.4 - 5.3 mmol/L 3.7   Chloride      94 - 109 mmol/L 100   Carbon Dioxide      20 - 32 mmol/L 21   Anion Gap      6 - 17 mmol/L 16   Glucose      60 - 99 mg/dL 214 (H)   Urea Nitrogen      5 - 24 mg/dL 20   Creatinine      0.52 - 1.04 mg/dL 1.02   GFR Estimate      >60 mL/min/1.7m2 58 (L)   GFR Estimate If Black      >60 mL/min/1.7m2 70   Calcium      8.5 - 10.4 mg/dL 9.5   Phosphorus      2.5 - 4.5 mg/dL 3.7   Albumin      3.9 - 5.1 g/dL 4.6     Component      Latest Ref Rng 4/30/2014   CRP Inflammation      0.0 - 8.0 mg/L 10.0 (H)   Sed Rate      0 - 20 mm/h 39 (H)   Rheumatoid Factor      <20 IU/mL <20   Glucose-6-PO4 Dehydrogenase       17.7     Component      Latest Ref Rng 6/11/2014 7/15/2014   WBC      4.0 - 11.0 10e9/L 11.0    RBC Count      3.8 - 5.2 10e12/L 4.74    Hemoglobin      11.7 - 15.7 g/dL 11.8    Hematocrit      35.0 - 47.0 % 36.1    MCV      78 - 100 fl 76 (L)    MCH      26.5 - 33.0 pg 24.9 (L)    MCHC      31.5 - 36.5 g/dL 32.7    RDW      10.0 - 15.0 % 13.9    Platelet Count      150 - 450 10e9/L 434    Diff Method       Automated Method    % Neutrophils       59.2    % Lymphocytes       31.6    % Monocytes       5.9    % Eosinophils       2.6    % Basophils       0.5    % Immature Granulocytes       0.2    Absolute Neutrophil      1.6 - 8.3 10e9/L 6.5    Absolute Lymphocytes      0.8 - 5.3 10e9/L 3.5    Absolute Monoctyes      0.0 - 1.3 10e9/L 0.7    Absolute Eosinophils      0.0 - 0.7 10e9/L 0.3    Absolute Basophils      0.0 - 0.2 10e9/L 0.1    Abs Immature Granulocytes      0 - 0.4 10e9/L 0.0    Sodium      133 - 144 mmol/L 138 140   Potassium      3.4 - 5.3 mmol/L 3.9 3.8   Chloride      94 - 109 mmol/L 97 103   Carbon Dioxide      20 - 32 mmol/L 26 22   Anion Gap      6 - 17 mmol/L 14.9 15   Glucose      60 - 99 mg/dL 111 (H) 163 (H)   Urea Nitrogen      5 - 24 mg/dL 28 (H) 15   Creatinine      0.52 - 1.04 mg/dL 1.10 (H) 0.99   GFR Estimate      >60 mL/min/1.7m2 53 (L) 60 (L)   GFR  Estimate If Black      >60 mL/min/1.7m2 64 72   Calcium      8.5 - 10.4 mg/dL 10.3 9.3   Bilirubin Total      0.2 - 1.3 mg/dL 0.6 0.2   Albumin      3.9 - 5.1 g/dL 5.1 4.2   Protein Total      6.8 - 8.8 g/dL 9.0 (H) 7.2   Alkaline Phosphatase      40 - 150 U/L 87 69   ALT      0 - 50 U/L 37 33   AST      0 - 45 U/L 40 26   Color Urine       Straw    Appearance Urine       Clear    Glucose Urine      NEG mg/dL Negative    Bilirubin Urine      NEG Negative    Ketones Urine      NEG mg/dL Negative    Specific Gravity Urine      1.003 - 1.035 1.005    Blood Urine      NEG Negative    pH Urine      5.0 - 7.0 pH 5.0    Protein Albumin Urine      NEG mg/dL Negative    Urobilinogen mg/dL      0.0 - 2.0 mg/dL Normal    Nitrite Urine      NEG Negative    Leukocyte Esterase Urine      NEG Negative    Source       Midstream Urine    Lipase      20 - 250 U/L 403 (H)    CRP Inflammation      0.0 - 8.0 mg/L <5.0    N-Terminal Pro Bnp      0 - 125 pg/mL  55   Hemoglobin A1C      4.3 - 6.0 %  7.0 (H)     Component      Latest Ref Rng 11/12/2014   Sodium      133 - 144 mmol/L 135   Potassium      3.4 - 5.3 mmol/L 3.8   Chloride      94 - 109 mmol/L 104   Carbon Dioxide      20 - 32 mmol/L 24   Anion Gap      3 - 14 mmol/L 7   Glucose      70 - 99 mg/dL 125 (H)   Urea Nitrogen      7 - 30 mg/dL 17   Creatinine      0.52 - 1.04 mg/dL 1.18 (H)   GFR Estimate      >60 mL/min/1.7m2 49 (L)   GFR Estimate If Black      >60 mL/min/1.7m2 59 (L)   Calcium      8.5 - 10.1 mg/dL 9.8   WBC      4.0 - 11.0 10e9/L 11.1 (H)   RBC Count      3.8 - 5.2 10e12/L 4.05   Hemoglobin      11.7 - 15.7 g/dL 9.8 (L)   Hematocrit      35.0 - 47.0 % 30.6 (L)   MCV      78 - 100 fl 76 (L)   MCH      26.5 - 33.0 pg 24.2 (L)   MCHC      31.5 - 36.5 g/dL 32.0   RDW      10.0 - 15.0 % 15.5 (H)   Platelet Count      150 - 450 10e9/L 484 (H)   CT CHEST PULMONARY EMBOLISM W CONTRAST 5/20/2015 4:57 PM  HISTORY: Pain. SOB. Elevated d-dimer.   TECHNIQUE: 65 mL Isovue 370.  Axial images with coronal  reconstructions.  COMPARISON: None.  FINDINGS: Calcified granuloma with surrounding scarring in the  posterolateral left upper lobe. Triangular shaped opacity at the right  lung base in the lateral right middle lobe could represent some  scarring, atelectasis or infiltrate. There is also some scarring or  atelectasis in the posteromedial right lung base. Lungs otherwise  clear.  The pulmonary arteries are well opacified. No CT evidence for acute  pulmonary embolus. No aortic dissection.  Diffuse fatty infiltration of the liver.  IMPRESSION  IMPRESSION:   1. No pulmonary embolus identified.  2. Small focus of atelectasis, infiltrate or scarring in the lateral  right middle lobe.  3. Old granulomatous disease.  4. Otherwise negative.  SILVERIO VAZQUEZ MD  Component      Latest Ref Rng 3/27/2015   WBC      4.0 - 11.0 10e9/L 11.8 (H)   RBC Count      3.8 - 5.2 10e12/L 4.53   Hemoglobin      11.7 - 15.7 g/dL 11.0 (L)   Hematocrit      35.0 - 47.0 % 33.8 (L)   MCV      78 - 100 fl 75 (L)   MCH      26.5 - 33.0 pg 24.3 (L)   MCHC      31.5 - 36.5 g/dL 32.5   RDW      10.0 - 15.0 % 15.4 (H)   Platelet Count      150 - 450 10e9/L 419   Diff Method       Automated Method   % Neutrophils       75.3   % Lymphocytes       17.4   % Monocytes       4.4   % Eosinophils       2.5   % Basophils       0.2   % Immature Granulocytes       0.2   Absolute Neutrophil      1.6 - 8.3 10e9/L 8.9 (H)   Absolute Lymphocytes      0.8 - 5.3 10e9/L 2.1   Absolute Monoctyes      0.0 - 1.3 10e9/L 0.5   Absolute Eosinophils      0.0 - 0.7 10e9/L 0.3   Absolute Basophils      0.0 - 0.2 10e9/L 0.0   Abs Immature Granulocytes      0 - 0.4 10e9/L 0.0   Creatinine      0.52 - 1.04 mg/dL 1.12 (H)   GFR Estimate      >60 mL/min/1.7m2 52 (L)   GFR Estimate If Black      >60 mL/min/1.7m2 63   Iron      35 - 180 ug/dL 25 (L)   Iron Binding Cap      240 - 430 ug/dL 346   Iron Saturation Index      15 - 46 % 7 (L)   Albumin      3.4 -  5.0 g/dL 4.0   ALT      0 - 50 U/L 24   AST      0 - 45 U/L 15   CRP Inflammation      0.0 - 8.0 mg/L 28.0 (H)   Sed Rate      0 - 20 mm/h 81 (H)   Rheumatoid Factor      <20 IU/mL <20   Vitamin D Deficiency screening      30 - 75 ug/L 44   Ferritin      8 - 252 ng/mL 34     Component      Latest Ref Rng 7/29/2015   Sodium      133 - 144 mmol/L 137   Potassium      3.4 - 5.3 mmol/L 3.8   Chloride      94 - 109 mmol/L 106   Carbon Dioxide      20 - 32 mmol/L 23   Anion Gap      3 - 14 mmol/L 8   Glucose      70 - 99 mg/dL 148 (H)   Urea Nitrogen      7 - 30 mg/dL 17   Creatinine      0.52 - 1.04 mg/dL 1.02   GFR Estimate      >60 mL/min/1.7m2 58 (L)   GFR Estimate If Black      >60 mL/min/1.7m2 70   Calcium      8.5 - 10.1 mg/dL 9.0   Bilirubin Total      0.2 - 1.3 mg/dL 0.5   Albumin      3.4 - 5.0 g/dL 4.2   Protein Total      6.8 - 8.8 g/dL 7.4   Alkaline Phosphatase      40 - 150 U/L 64   ALT      0 - 50 U/L 23   AST      0 - 45 U/L 19     Results for FAVIO MARTINEZ (MRN [id number]) as of 10/30/2015 17:00   Ref. Range 8/21/2012 09:06 5/22/2013 14:22 4/14/2014 12:06 9/11/2014 12:35 12/4/2014 16:38   TSH Latest Range: 0.40-4.00 mU/L 0.83 0.43 0.27 (L) 0.23 (L) 0.22 (L)     Component      Latest Ref Rng 10/30/2015   WBC      4.0 - 11.0 10e9/L 13.4 (H)   RBC Count      3.8 - 5.2 10e12/L 4.76   Hemoglobin      11.7 - 15.7 g/dL 12.4   Hematocrit      35.0 - 47.0 % 37.9   MCV      78 - 100 fl 80   MCH      26.5 - 33.0 pg 26.1 (L)   MCHC      31.5 - 36.5 g/dL 32.7   RDW      10.0 - 15.0 % 14.5   Platelet Count      150 - 450 10e9/L 324   Diff Method       Automated Method   % Neutrophils       67.7   % Lymphocytes       22.7   % Monocytes       6.3   % Eosinophils       2.7   % Basophils       0.4   % Immature Granulocytes       0.2   Absolute Neutrophil      1.6 - 8.3 10e9/L 9.1 (H)   Absolute Lymphocytes      0.8 - 5.3 10e9/L 3.1   Absolute Monoctyes      0.0 - 1.3 10e9/L 0.9   Absolute Eosinophils      0.0 - 0.7  10e9/L 0.4   Absolute Basophils      0.0 - 0.2 10e9/L 0.1   Abs Immature Granulocytes      0 - 0.4 10e9/L 0.0   Creatinine      0.52 - 1.04 mg/dL 1.21 (H)   GFR Estimate      >60 mL/min/1.7m2 47 (L)   GFR Estimate If Black      >60 mL/min/1.7m2 57 (L)   Iron      35 - 180 ug/dL 70   Iron Binding Cap      240 - 430 ug/dL 428   Iron Saturation Index      15 - 46 % 16   Albumin      3.4 - 5.0 g/dL 4.6   ALT      0 - 50 U/L 29   AST      0 - 45 U/L 21   CRP Inflammation      0.0 - 8.0 mg/L <2.9   Sed Rate      0 - 20 mm/h 8   Vitamin D Deficiency screening      20 - 75 ug/L 46   Ferritin      8 - 252 ng/mL 18   TSH      0.40 - 4.00 mU/L 0.49   T4 Free      0.76 - 1.46 ng/dL 1.06   Free T3      2.3 - 4.2 pg/mL 2.8     Component      Latest Ref Rng 11/17/2015   Testosterone Total      8 - 60 ng/dL 19   Sex Hormone Binding Globulin      30 - 135 nmol/L 32   Free Testosterone Calculated      0.11 - 0.58 ng/dL 0.34   Vitamin A       0.61   Retinol Palmitate       <0.02 . . .   Vitamin A Interp       Normal . . .   Thyroglobulin Antibody      <40 IU/mL <20   Thyroid Peroxidase Antibody      <35 IU/mL 31   DHEA Sulfate      35 - 430 ug/dL 101   Zinc       68     Component      Latest Ref Rng 1/27/2016   Sodium      133 - 144 mmol/L 135   Potassium      3.4 - 5.3 mmol/L 4.0   Chloride      94 - 109 mmol/L 104   Carbon Dioxide      20 - 32 mmol/L 24   Anion Gap      3 - 14 mmol/L 7   Glucose      70 - 99 mg/dL 88   Urea Nitrogen      7 - 30 mg/dL 20   Creatinine      0.52 - 1.04 mg/dL 1.13 (H)   GFR Estimate      >60 mL/min/1.7m2 51 (L)   GFR Estimate If Black      >60 mL/min/1.7m2 62   Calcium      8.5 - 10.1 mg/dL 9.4   Bilirubin Total      0.2 - 1.3 mg/dL 0.7   Albumin      3.4 - 5.0 g/dL 4.3   Protein Total      6.8 - 8.8 g/dL 7.7   Alkaline Phosphatase      40 - 150 U/L 66   ALT      0 - 50 U/L 24   AST      0 - 45 U/L 18   Cholesterol      <200 mg/dL 112   Triglycerides      <150 mg/dL 100   HDL Cholesterol      >49 mg/dL  "34 (L)   LDL Cholesterol Calculated      <100 mg/dL 58   Non HDL Cholesterol      <130 mg/dL 78   N-Terminal Pro Bnp      0 - 125 pg/mL 29   Hemoglobin A1C      4.3 - 6.0 % 7.0 (H)   Amylase      30 - 110 U/L 60   Lipase      73 - 393 U/L 394 (H)   Troponin I ES      0.000 - 0.045 ug/L <0.015 . . .     Component      Latest Ref Rng 2/24/2016   Angiotensin Converting Enzyme       <5 (L) . . .   Neutrophil Cytoplasmic IgG Antibody       <1:20 . . .   Sed Rate      0 - 20 mm/h 86 (H)     Copath Report      Patient Name: FAVIO MARTINEZ   MR#: [id number]   Specimen #: [id number]   Collected: 3/15/2016   Received: 3/15/2016   Reported: 3/17/2016 13:33   Ordering Phy(s): PARVEEN ENRIQUEZ     ORIGINAL REPORT FOLLOWS ADDENDUM  ADDENDUM     TO ORIGINAL REPORT   Status: Signed Out   Date Ordered:3/18/2016   Date Complete:3/18/2016   Date Reported:3/18/2016 12:06   Signed Out By: Marianne Godfrey MD     INTERPRETATION:   This addendum is done to incorporate the results of fungal (GMS) stains   done on the specimen.  Specimen is negative for fungal organisms.  The   original final diagnosis remains unchanged.     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  Negative for malignancy(Please   see microscopic description)     Electronically signed out by:     Marianne Godfrey MD     CLINICAL HISTORY:   right orbital mass     GROSS:   The specimen is received labeled \"right orbital biopsy\" and consists of   red-tan nodule measuring 1.5 x 0.9 x 0.6 cm with one smooth side and   opposite rough side consistent with periosteum.  The specimen is   bisected revealing homogenous pale tan fleshy cut surface.  Touch   preparations are prepared, air dried and fixed, portion of specimen is   submitted in RPMI for possible " lymphoma workup [facility],   ([facility], [place], [place] ).  The remainder is entirely submitted.   (Dictated by: Marianne Godfrey MD 3/15/2016 04:45 PM)     MICROSCOPIC:     Specimen consists of portion of lacrimal gland with acute and chronic   inflammation.  Focal area of microabscess formation associated with   small areas of necrosis are also present.  Inflammation is seen   extending to the periorbital adipose tissue forming panniculitis.   Specimen is negative for malignancy.  Samples sent for immunophenotyping   to [facility], ([facility], [place], [place] ) reveals no evidence   of monoclonality or aberrant antigen expression.  A GMS (fungal) stain   is pending and results will be reported as an addendum.     CPT Codes:   A: 88344-AJ7, 62581-OQHYN, SOH, 69714-HFBKA, 44922-KLDU     TESTING LAB LOCATION:   [address]  [id number] [phone]     COLLECTION SITE:   Client: [facility]   Location: [facility] (S)            Component      Latest Ref Rng 4/29/2016   Nucleated RBCs      0 /100 0   Absolute Neutrophil      1.6 - 8.3 10e9/L 8.9 (H)   Absolute Lymphocytes      0.8 - 5.3 10e9/L 3.2   Absolute Monocytes      0.0 - 1.3 10e9/L 0.8   Absolute Eosinophils      0.0 - 0.7 10e9/L 0.2   Absolute Basophils      0.0 - 0.2 10e9/L 0.1   Abs Immature Granulocytes      0 - 0.4 10e9/L 0.1   Absolute Nucleated RBC       0.0   IGG      695 - 1620 mg/dL 836   IgG1      300 - 856 mg/dL 280 (L)   IgG2      158 - 761 mg/dL 277   IgG3      24 - 192 mg/dL 35   IgG4      11 - 86 mg/dL 16   RNP Antibody IgG      0.0 - 0.9 AI <0.2 . . .   Smith SUNNY Antibody IgG      0.0 - 0.9 AI <0.2 . . .   SSA (Ro) (SUNNY) Antibody, IgG      0.0 - 0.9 AI <0.2 . . .   SSB (La) (SUNNY) Antibody, IgG      0.0 - 0.9 AI <0.2 . . .   Scleroderma Antibody Scl-70 SUNNY IgG      0.0 - 0.9 AI <0.2 . . .   Cholesterol      <200 mg/dL 154   Triglycerides      <150 mg/dL 220 (H)    HDL Cholesterol      >49 mg/dL 42 (L)   LDL Cholesterol Calculated      <100 mg/dL 67   Non HDL Cholesterol      <130 mg/dL 111   M Tuberculosis Result      NEG Negative   M Tuberculosis Antigen Value       0.00   Albumin      3.4 - 5.0 g/dL 4.3   ALT      0 - 50 U/L 30   AST      0 - 45 U/L 10   Complement C3      76 - 169 mg/dL 157   Complement C4      15 - 50 mg/dL 32   CRP Inflammation      0.0 - 8.0 mg/L <2.9   Sed Rate      0 - 20 mm/h 2   DNA-ds      <10 IU/mL 1   Cyclic Citrullinated Peptide Antibody, IgG      <7 U/mL 1   Rheumatoid Factor      <20 IU/mL <20   Proteinase 3 Antibody IgG      0.0 - 0.9 AI <0.2 . . .   Myeloperoxidase Antibody IgG      0.0 - 0.9 AI 2.5 (H)   Vitamin D Deficiency screening      20 - 75 ug/L 24   Hemoglobin A1C      4.3 - 6.0 % 7.9 (H)   ALLI by IFA IgG       1:40 (A) . . .     ROS:  A comprehensive ROS was done, positives are per HPI.        HISTORY REVIEW:  Past Medical History:   Diagnosis Date     Abnormal glandular Papanicolaou smear of cervix 1992     Allergic rhinitis     Allergy, airborne subst     Arthritis      ASCVD (arteriosclerotic cardiovascular disease)      Chronic pain      Chronic pancreatitis (H)     idiopathic, Tx: PPI, antioxidants, pancreatic enzymes     Common migraine      Coronary artery disease      Costochondritis      Costochondritis      Difficulty in walking(719.7)      Ectasia, mammary duct     [facility], persistent nipple discharge     Elevated fasting glucose      Gastro-oesophageal reflux disease      Hernia      Hyperlipidemia LDL goal < 100      Hypertension goal BP (blood pressure) < 140/90     Essential hypertension     Iron deficiency anemia      Ischemic cardiomyopathy      Menorrhagia      Migraine headaches      Mild persistent asthma      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 Matias, neg w/u, Rx'd w/prednisone     Ventral hernia, unspecified, without mention of obstruction or gangrene 2012     Past Surgical History:   Procedure Laterality Date     C ECHO HEART XTHORACIC,COMPLETE, W/O DOPPLER  04    [facility] WNL, EF 60%     C/SECTION, LOW TRANSVERSE           CARDIAC SURGERY      cardiac stent- recent in 16; 4 other stents     DILATION AND CURETTAGE N/A 2016    Procedure: DILATION AND CURETTAGE;  Surgeon: Nahed Butler MD;  Location: [facility]     HC UGI ENDOSCOPY W EUS  08    Dr. Pastrana, possible chronic pancreatitis, fatty liver     HERNIA REPAIR  2012    done at [facility] [place]     INSERT INTRAUTERINE DEVICE N/A 2016    Procedure: INSERT INTRAUTERINE DEVICE;  Surgeon: Nahed Butler MD;  Location: [facility]     INT UTERINE FIBRIOD EMBOLIZATION  10/29/2014     LAPAROSCOPIC CHOLECYSTECTOMY  08    Dr. Arnol GRUBBS TX, CERVICAL      s/p LEEP     ORBITOTOMY Right 3/15/2016    Procedure: ORBITOTOMY;  Surgeon: Myron Cyr MD;  Location: [facility]     UPPER GI ENDOSCOPY  10/21/08    mild gastritis, Dr. Hidalgo     Family History   Problem Relation Age of Onset     HEART DISEASE Brother 15     MI at 15, 16.      HEART DISEASE Father 50     heart attack     CEREBROVASCULAR DISEASE Father      DIABETES Father      Hypertension Father      Depression Father      C.A.D. Father      DIABETES Maternal Uncle      Hypertension Mother       Arthritis Mother      HEART DISEASE Mother      long qt syndrome     Thyroid Disease Mother      C.A.D. Mother      Hypertension Maternal Aunt      Hypertension Maternal Uncle      Arthritis Brother      he passed away, had severe arthritis at age 11     Arthritis Maternal Uncle      Eye Disorder Maternal Uncle      cataracts     Neurologic Disorder Sister      migraines     Neurologic Disorder Sister      migraines     Respiratory Son      asthma     CEREBROVASCULAR DISEASE Maternal Uncle      C.A.D. Brother      Family History Negative       neg for RA, SLE     Unknown/Adopted No family hx of      unknown neurological issues in her family, mother was adopted     Skin Cancer No family hx of      No known family hx of skin cancer     Social History     Social History     Marital status: Single     Spouse name: N/A     Number of children: 1     Years of education: N/A     Occupational History      None      Social History Main Topics     Smoking status: Former Smoker     Packs/day: 0.20     Years: 1.00     Types: Cigarettes     Quit date: 2/1/2016     Smokeless tobacco: Never Used     Alcohol use No     Drug use: No     Sexual activity: Not Currently     Other Topics Concern     Parent/Sibling W/ Cabg, Mi Or Angioplasty Before 65f 55m? Yes     Social History Narrative    Balanced Diet - sometimes    Osteoporosis Prevention Measures - Dairy servings per day: 2 servings weekly    Regular Exercise -  Yes Describe walking 4 times a week    Dental Exam - NO    Seatbelts used - Yes    Self Breast Exam - Yes    Abuse: Current or Past (Physical, Sexual or Emotional)- No    Do you have any concerns about STD's -  No    Do you feel safe in your environment - No    Guns stored in the home - No    Sunscreen used - Yes    Lipids -  YES - Date: 053102    Glucose -  YES - Date: 012804    Eye Exam - YES - Date: one year ago    Colon Cancer Screening - No    Hemoccults - NO    Pap Test -  YES - Date: 070904, remote history of LEEP     Mammography - YES - Date: last spring, would like to discuss, needs [facility]    DEXA - NO    Immunizations reviewed and up to date - Yes, last td given in  or      Patient Active Problem List   Diagnosis     Seasonal affective disorder (H)     Allergic rhinitis     PCOS (polycystic ovarian syndrome)     Moderate persistent asthma     Chronic pancreatitis (H)     Hypertension goal BP (blood pressure) < 140/90     Common migraine     NSTEMI (non-ST elevated myocardial infarction) (H)     Hyperlipidemia LDL goal <70     ASCVD (arteriosclerotic cardiovascular disease)     GERD (gastroesophageal reflux disease)     Ischemic cardiomyopathy     Hypertensive heart disease     Uterine leiomyoma     Iron deficiency anemia     Costochondritis     Vitamin D deficiency     Breast cancer screening     Spinal stenosis in cervical region     Fibromyalgia     Seronegative rheumatoid arthritis (H)     Type 2 diabetes, HbA1C goal < 8% (H)     Type 2 diabetes mellitus with other specified complication (H)     Hyperlipidemia associated with type 2 diabetes mellitus (H)     Hypertension associated with diabetes (H)     Overweight with body mass index (BMI) 25.0-29.9     Tobacco use disorder     Telogen effluvium     CAD S/P percutaneous coronary angioplasty     Status post coronary angiogram       Pregnancy Hx: She is . All misscarriages were in first trimester. H/o OC use in the past. 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    PMHx, FHx, SHx were reviewed, unchanged.      Outpatient Encounter Prescriptions as of 2017   Medication Sig Dispense Refill     lidocaine (XYLOCAINE) 5 % ointment Apply 1 g topically 2 times daily as needed for moderate pain 50 g 5     Ketoprofen POWD Apply 1 g topically 2 times daily as needed 100 g 5     hydrALAZINE (APRESOLINE) 25 MG tablet Take 0.5 tablets (12.5 mg) by mouth daily . May take additional one-half tablet once daily if systolic  blood pressure >140 mmHg. 90 tablet 3     traMADol (ULTRAM) 50 MG tablet Take 1 tablet (50 mg) by mouth every 8 hours as needed for moderate pain 60 tablet 3     folic acid (FOLVITE) 1 MG tablet 1 tab daily 90 tablet 2     fluconazole (DIFLUCAN) 100 MG tablet Take 100 mg by mouth daily as needed       hydroxychloroquine (PLAQUENIL) 200 MG tablet Take 2 tablets (400 mg) by mouth daily EYE EXAM DUE/LETTER SENT 180 tablet 0     insulin glargine (BASAGLAR KWIKPEN) 100 UNIT/ML injection Inject 40 Units Subcutaneous daily 9 mL 3     ranitidine (ZANTAC) 150 MG tablet Take 1 tablet (150 mg) by mouth 2 times daily 60 tablet 2     ferrous gluconate (FERGON) 324 (38 FE) MG tablet Take 1 tablet (324 mg) by mouth 2 times daily 180 tablet 1     ASPIRIN LOW DOSE 81 MG EC tablet TAKE 1 TABLET BY MOUTH EVERY DAY 90 tablet 3     blood glucose monitoring (ONE TOUCH DELICA) lancets Use to test blood sugars 4 times daily or as directed. 4 Box 3     vitamin D (ERGOCALCIFEROL) 64249 UNIT capsule Take 1 capsule (50,000 Units) by mouth every 7 days Need a Vitamin D level in 2 months. (Patient taking differently: Take 50,000 Units by mouth every 7 days Need a Vitamin D level in 2 months.) 8 capsule 0     metoprolol (TOPROL-XL) 100 MG 24 hr tablet Take 1 tablet (100 mg) by mouth daily 90 tablet 3     clopidogrel (PLAVIX) 75 MG tablet Take 1 tablet (75 mg) by mouth daily 90 tablet 3     lisinopril-hydrochlorothiazide (PRINZIDE/ZESTORETIC) 20-25 MG per tablet TAKE 1 TABLET BY MOUTH DAILY 90 tablet 3     blood glucose monitoring (NO BRAND SPECIFIED) meter device kit Use to test blood sugar 4 X times daily or as directed. 1 kit 0     blood glucose monitoring (NO BRAND SPECIFIED) test strip 1 strip by In Vitro route 4 times daily Test as directed. Please dispense three months and three months of refills. Thank you. 360 each 3     diphenhydrAMINE (BENADRYL) 25 MG capsule TK 1 TO 2 CS PO QHS  4     EPIPEN 2-RIKY 0.3 MG/0.3ML injection INJECT 0.3 MG  INTO THE MUSCLE PRF ANAPHYALAXIS  0     AEROSPAN 80 MCG/ACT AERS INHALE 2 PUFFS PO BID.  RINSE MOUTH AFTERWARDS  4     fluticasone (FLONASE) 50 MCG/ACT spray U 2 SPRAYS IEN D.  3     Magnesium Oxide -Mg Supplement 250 MG TABS TK 1 T PO BID. REDUCE IF STOOLS LOOSEN  11     nystatin (MYCOSTATIN) 050367 UNITS TABS tablet TK 2 TS PO BID  0     albuterol (2.5 MG/3ML) 0.083% neb solution INL 1 VIAL VIA NEBULIZATION Q 4 TO 6 HOURS PRN  1     ASPIRIN LOW DOSE 81 MG EC tablet TK 1 T PO D  3     dicyclomine (BENTYL) 20 MG tablet Take 1 tablet (20 mg) by mouth 4 times daily as needed 60 tablet 5     sucralfate (CARAFATE) 1 GM tablet Take 1 tablet (1 g) by mouth 4 times daily as needed 120 tablet 3     azelastine (ASTELIN) 0.1 % spray Spray 2 sprays into both nostrils 2 times daily 1 Bottle 3     fluocinolone (SYNALAR) 0.01 % solution Apply topically daily as needed       mometasone (NASONEX) 50 MCG/ACT spray Spray 2 sprays into both nostrils daily       cyclobenzaprine (FLEXERIL) 10 MG tablet Take 1 tablet (10 mg) by mouth 2 times daily as needed for muscle spasms 180 tablet 0     Ondansetron HCl (ZOFRAN PO)        pravastatin (PRAVACHOL) 40 MG tablet Take 1 tablet (40 mg) by mouth daily 30 tablet 11     spironolactone (ALDACTONE) 25 MG tablet Take 2 tablets (50 mg) by mouth daily 60 tablet 11     metFORMIN (GLUCOPHAGE-XR) 500 MG 24 hr tablet Take 2 tablets (1,000 mg) by mouth daily (with dinner) 60 tablet 11     montelukast (SINGULAIR) 10 MG tablet Take 1 tablet (10 mg) by mouth At Bedtime 30 tablet 1     ESOMEPRAZOLE MAGNESIUM PO Take 20 mg by mouth 2 times daily (before meals)       insulin pen needle 32G X 4 MM Use 1 daily as directed. 300 each 3     acetaminophen (TYLENOL) 325 MG tablet Take 1-2 tablets (325-650 mg) by mouth every 6 hours as needed for pain (headache) 250 tablet 0     metroNIDAZOLE (NORITATE) 1 % cream Apply topically daily       desonide (DESOWEN) 0.05 % cream Apply topically 2 times daily        ketoconazole (NIZORAL) 2 % shampoo Apply topically daily as needed       fluocinonide (LIDEX) 0.05 % external solution Apply topically 2 times daily To areas of itch on the scalp as needed. 60 mL 11     sucralfate (CARAFATE) 1 GM tablet Take 1 tablet (1 g) by mouth 4 times daily as needed 40 tablet 1     nitroglycerin (NITROSTAT) 0.4 MG SL tablet Place 1 tablet (0.4 mg) under the tongue every 5 minutes as needed for chest pain 25 tablet 1     albuterol (PROAIR HFA, PROVENTIL HFA, VENTOLIN HFA) 108 (90 BASE) MCG/ACT inhaler Inhale 2 puffs into the lungs every 6 hours as needed for shortness of breath / dyspnea or wheezing 3 Inhaler 1     calcium carbonate (TUMS) 500 MG chewable tablet Take 3-4 chew tab by mouth daily as needed.       fexofenadine (ALLEGRA) 180 MG tablet Take 1 tablet by mouth daily as needed. 90 tablet 3     olopatadine (PATANOL) 0.1 % ophthalmic solution Place 1 drop into both eyes 2 times daily as needed for allergies. 5 mL 12     No facility-administered encounter medications on file as of 4/7/2017.        Allergies   Allergen Reactions     Amoxicillin-Pot Clavulanate      Augmentin      Unknown possible hives and edema     Azithromycin      Diatrizoate Other (See Comments)     Pt wants this listed because she is allergic to shellfish      Imitrex [Sumatriptan]      Severe face/neck/chest tightness and flushing side effects      Penicillins Hives     Unknown      Pork Allergy      Stomach pain, cramping, diarrhea, itching, nausea and headaches     Shellfish Allergy Hives and Swelling     Sulfa Drugs Hives and Swelling     Zithromax [Azithromycin Dihydrate] Swelling     Unknown          Ph.E:    Vitals:    04/07/17 1317   BP: 101/60   Pulse: 74   SpO2: 99%   Weight: 76.2 kg (168 lb)           Constitutional: WD/WN. Pleasant. In no acute distress. Obese.  Eyes: Swelling around R eye worsened since last visit, EOMI  HEENT: No oral ulcers or thrush. Normal salivary pool.  Neck: No cervical LAP, +  thyromegaly  Chest: Clear to auscultation bilaterally  CV: RRR, no murmurs/ rubs or gallops. No edema, clubbing or cyanosis.   GI: Abdomen is soft and non tender.  MS: No synovitis or joint tenderness. Cool joints. No joint deformities. Full ROM of the joints. No nodules. +Fibromyalgia trigger points.  Skin: No livedo, periungual erythema, digital ulcers or nail changes. + alopecia with scales, facial malar erythema (looks like seborrhoic dermatitis).  Neuro: A&O x 3. Grossly non focal, muscular power 5/5 in all ext  Psych: NL affect and mood    Assessment/ plan:    1-Seropositive non-erosive RA (arthritis, AM stiffness, high CRP, RF 26 but re-check neg 3/2015 on HCQ) Dx 5/2013, FMS, new pleuritic CP 3/20-15-5/2015 resolved on steroids. She is on  mg bid since 5/2014. She tolerates it well and it's helping some but not enough to control all her pain. Continues having flare ups of joint pain, swelling also has FMS. Joint sx are getting worse. Had high ESR/CRP in 3/2015 and new pleuritic CP between 3/2015-5/2015 which resolved after taking medrol dose pack prescribed 5/20/2015. Her pleuritic CP could be related to her RA. At last visit, recommended a trial of MTX 10 mg po qwk to be added to her HCQ; however after discussing risks she decided to hold off as she liked to re-try tramadol (believes it was gabapentin causing her SEs not tramadol) and to try flexeril at bid dose prn. Then stopped tramadol as it blames it for recent episodes of CP.    In the past, MTX was recommended, she declined.    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 Etiology of mass unclear, but it's inflammatory and related to pt's underlying autoimmune disease (inflammatory arthritis, serositis). It is very possible that this is related to ANCA vasculitis causing  orbit pseudotumor given +MPO.    Her work up showed borderline + ALLI and slightly elevated MPO. I told her prednisone is not good for her diabetes and weight gain. Recommended a trial of MTX as next step. Discussed risks on details. I called her neuro-ophthalmologist at last visit who agreed with trial of MTX (she suspects pseudo-sarcoidosis or sarcoid like disease but no granuloma and ACE not elevated).     Unfortunately despite all my efforts to explain risks vs benefits (more than risks) of MTX as steroid sparing gent, Janine declined to try MTX. I was very concerned about her R orbital inflammatory mass as there is high chance of flare up off steroids and steroid causes her weigh gain and uncontrolled diabetes. I even offered her other steroid sparing gents like imuran. She declined that as well.    Her inflammation around R orbit has recurred now. Again I spent quite amount of time discussing a trial of MTX. After discussing risks/benefits, she agreed to try it. If fails or does not respond to MTX, will try AZA, will check TPMT.    Start MTX 4 tab a week along with daily FA 1 mg a day  Labs today and MTX monitoring labs on 4/28/2017  CT chest/abdomen/pelvis      Continue accupuncture.     My impression is that her arthralgias are a combination of IA, fibromyalgia and chronic pain.  She does not think HCQ is beneficial and wants to stop, OK to stop but resume if arthralgia gets worse then would need annual eye exam    2-Fad pads. She was seen by endocrinology and cushing was ruled out in 4/2014. Was advised to do f/u for enlarged thyroid/thyroid nodules.    3-Hair loss. F/U with dermatology    4-Chronic pain. F/U with pain clinic    5-Concerns of subclinical hyperthyroidism: TSH is barely minimal, chart review shows that those lowest levels are since April 2014. At last visit, discussed Endocrinology ref which pt wants to hold off in this visit.     6-Vit D deficiency. Was replaced with vit D 50, 000 units po qwk  x 12 wk then 2000 units qd    PLAN: Follow up in May 2017    MEDICATION CHANGES: as above      Orders Placed This Encounter   Procedures     ALT     Albumin level     AST     CBC with platelets differential     Creatinine     CRP inflammation     Erythrocyte sedimentation rate auto     Vasculitis panel     Antineutrophil cytoplasmic Marline IgG     Immunoglobulin G subclasses     Angiotensin converting enzyme     Albumin level     ALT     AST     CBC with platelets differential     Creatinine     TPMT Genotype     Vitamin C     [facility] Miscellaneous Test               HPI      ROS      Physical Exam              HPI      ROS      Physical Exam

## 2017-04-07 NOTE — LETTER
Patient:  Janine Cornell  :   1965  MRN:     [id number]        Ms.Lisa CHELLE Cornell  [address] [facility] [address]        2017    Dear ,    We are writing to inform you of your test results. Repeat vasculitis markers are positive.      Resulted Orders   Vasculitis panel   Result Value Ref Range    Myeloperoxidase Antibody IgG 2.9 (H) 0.0 - 0.9 AI      Comment:      Positive   Antibody index (AI) values reflect qualitative changes in antibody   concentration that cannot be directly associated with clinical condition or   disease state.      Proteinase 3 Antibody IgG  0.0 - 0.9 AI     <0.2  Negative   Antibody index (AI) values reflect qualitative changes in antibody   concentration that cannot be directly associated with clinical condition or   disease state.     Antineutrophil cytoplasmic Marline IgG   Result Value Ref Range    Neutrophil Cytoplasmic IgG Antibody (A)      1:80  Reference range: <1:20  (Note)  Atypical perinuclear ANCA (atypical p-ANCA) staining  pattern observed. Presence of p-ANCA ruled out on  formalin-fixed neutrophils. This staining pattern is  associated with inflammatory bowel diseases, particularly  ulcerative colitis. It may also be seen in primary  sclerosis cholangitis.  INTERPRETIVE INFORMATION: Anti-Neutrophil Cyto Ab, IgG  Neutrophil Cytoplasmic Antibodies (C-ANCA = granular  cytoplasmic staining, P-ANCA = perinuclear staining) are  found in the serum of over 90 percent of patients with  certain necrotizing systemic vasculitides, and usually in  less than 5 percent of patients with collagen vascular  disease or arthritis.  Performed by [facility],  [address] [place] [address] [phone]  www.[email or web address], Ameya Akers MD, Lab. Director     Immunoglobulin G subclasses   Result Value Ref Range     695 - 1620 mg/dL    IgG1 Test sent to [facility]. See ARMISC. 300 - 856 mg/dL    IgG2 Test sent to [facility]. See ARMISC. 158 - 761 mg/dL    IgG3 Test  sent to [facility]. See ARMISC. 24 - 192 mg/dL    IgG4 Test sent to [facility]. See ARMISC. 11 - 86 mg/dL   Angiotensin converting enzyme   Result Value Ref Range    Angiotensin Converting Enzyme (L)      <5  Reference range: 9 to 67  Unit: U/L  (Note)  Performed by [facility],  [address] [place] [address] [phone]  www.[email or web address], Ameya Akers MD, Lab. Director     Vitamin C   Result Value Ref Range    Vitamin C 56       Comment:      Reference range: 23 to 114  Unit: umol/L  (Note)  INTERPRETIVE DATA: Vitamin C (Ascorbic Acid), Plasma  Vitamin C concentrations lower than 11 umol/L indicate  deficiency. Concentrations between 11 and 23 umol/L are  consistent with a moderate risk of deficiency due to  inadequate tissue stores.  Vitamin C concentration is reported as micromoles per liter  (umol/L). To convert concentration to milligrams per  deciliter (mg/dL), multiply the result by 0.0176.  Test developed and characteristics determined by [facility]. See Compliance Statement B: [facility] [employer]/  Performed by [facility],  [address] [place] [address] [phone]  www.[employer], Ameya Akers MD, Lab. Director     [facility] Miscellaneous Test   Result Value Ref Range    Result       SEE NOTE  (Note)  Test name                    Result Flag  Units  RefIntvl  ------------------------------------------------------------  Immunoglobulin G Subclass 1                                 399       mg/dL 240-1118  REFERENCE INTERVAL: Immunoglobulin G Subclass 1  Access complete set of age- and/or gender-specific  reference intervals for this test in the [facility]  Test Directory ([email or web address]).  Immunoglobulin G Subclass 2                                 325       mg/dL 124-549  REFERENCE INTERVAL: Immunoglobulin G Subclass 2  Access complete set of age- and/or gender-specific  reference intervals for this test in the [facility]  Test Directory ([email or web address]).  Immunoglobulin G Subclass 3                                   52       mg/dL   REFERENCE INTERVAL: Immunoglobulin G Subclass 3  Access complete set of age- and/or gender-specific  reference intervals for this test in the [facility]  Test Directory ([email or web address]).  Immunoglobulin G Subclass 4                                   20       mg/dL 1-123  The total IgG (mg/dL) can be derived by the sum of the  subclasses IgG1, IgG2, IgG3 and IgG4 values. However, a  confirmatory and more precise total IgG is available by the  nephelometric method of total IgG (Test # [id number]).  REFERENCE INTERVAL: Immunoglobulin G Subclass 4  Access complete set of age- and/or gender-specific  reference intervals for this test in the [facility]  Test Directory ([email or web address]).  Performed by [facility],  [address] [place] [address] [phone]  www.[email or web address], Ameya Akers MD, Lab. Director      Test Name IGG SUBCLASSES     [facility] Misc Test Code 52041     [facility] Misc Test Specimen Serum        Majo Mckinnon MD

## 2017-04-08 LAB — ACE SERPL-CCNC: ABNORMAL U/L

## 2017-04-09 LAB — DEPRECATED CALCIDIOL+CALCIFEROL SERPL-MC: 40 UG/L (ref 20–75)

## 2017-04-10 ENCOUNTER — TELEPHONE (OUTPATIENT)
Dept: INTERNAL MEDICINE | Facility: CLINIC | Age: 52
End: 2017-04-10

## 2017-04-11 LAB
IGG SERPL-MCNC: 962 MG/DL (ref 695–1620)
IGG1 SER-MCNC: NORMAL MG/DL (ref 300–856)
IGG2 SER-MCNC: NORMAL MG/DL (ref 158–761)
IGG3 SER-MCNC: NORMAL MG/DL (ref 24–192)
IGG4 SER-MCNC: NORMAL MG/DL (ref 11–86)
VIT C SERPL-MCNC: 56 MG/DL

## 2017-04-12 LAB
RESULT: NORMAL
SEND OUTS MISC TEST CODE: NORMAL
SEND OUTS MISC TEST SPECIMEN: NORMAL
TEST NAME: NORMAL

## 2017-04-13 LAB
ANCA IGG TITR SER IF: ABNORMAL {TITER}
MYELOPEROXIDASE AB SER-ACNC: 2.9 AI (ref 0–0.9)
PROTEINASE3 IGG SER-ACNC: ABNORMAL AI (ref 0–0.9)

## 2017-04-19 LAB — LAB SCANNED RESULT: NORMAL

## 2017-04-26 ENCOUNTER — OFFICE VISIT (OUTPATIENT)
Dept: FAMILY MEDICINE | Facility: CLINIC | Age: 52
End: 2017-04-26

## 2017-04-26 VITALS
RESPIRATION RATE: 16 BRPM | WEIGHT: 171 LBS | DIASTOLIC BLOOD PRESSURE: 64 MMHG | BODY MASS INDEX: 30.29 KG/M2 | HEART RATE: 78 BPM | TEMPERATURE: 98.3 F | SYSTOLIC BLOOD PRESSURE: 97 MMHG

## 2017-04-26 DIAGNOSIS — E11.9 DM TYPE 2, GOAL HBA1C 7%-8% (H): ICD-10-CM

## 2017-04-26 DIAGNOSIS — H05.111: ICD-10-CM

## 2017-04-26 DIAGNOSIS — M79.7 FIBROMYALGIA: Primary | ICD-10-CM

## 2017-04-26 LAB
ALBUMIN SERPL-MCNC: 4.1 G/DL (ref 3.4–5)
ALT SERPL W P-5'-P-CCNC: 25 U/L (ref 0–50)
AST SERPL W P-5'-P-CCNC: 15 U/L (ref 0–45)
BASOPHILS # BLD AUTO: 0.1 10E9/L (ref 0–0.2)
BASOPHILS NFR BLD AUTO: 0.4 %
CREAT SERPL-MCNC: 1.24 MG/DL (ref 0.52–1.04)
CREAT UR-MCNC: 121 MG/DL
DIFFERENTIAL METHOD BLD: ABNORMAL
EOSINOPHIL # BLD AUTO: 0.5 10E9/L (ref 0–0.7)
EOSINOPHIL NFR BLD AUTO: 3.9 %
ERYTHROCYTE [DISTWIDTH] IN BLOOD BY AUTOMATED COUNT: 13.4 % (ref 10–15)
GFR SERPL CREATININE-BSD FRML MDRD: 46 ML/MIN/1.7M2
HBA1C MFR BLD: 7.8 % (ref 4.3–6)
HCT VFR BLD AUTO: 35 % (ref 35–47)
HGB BLD-MCNC: 11.2 G/DL (ref 11.7–15.7)
IMM GRANULOCYTES # BLD: 0 10E9/L (ref 0–0.4)
IMM GRANULOCYTES NFR BLD: 0.3 %
LYMPHOCYTES # BLD AUTO: 2.7 10E9/L (ref 0.8–5.3)
LYMPHOCYTES NFR BLD AUTO: 22.7 %
MCH RBC QN AUTO: 26.5 PG (ref 26.5–33)
MCHC RBC AUTO-ENTMCNC: 32 G/DL (ref 31.5–36.5)
MCV RBC AUTO: 83 FL (ref 78–100)
MICROALBUMIN UR-MCNC: <5 MG/L
MICROALBUMIN/CREAT UR: NORMAL MG/G CR (ref 0–25)
MONOCYTES # BLD AUTO: 0.8 10E9/L (ref 0–1.3)
MONOCYTES NFR BLD AUTO: 6.5 %
NEUTROPHILS # BLD AUTO: 7.8 10E9/L (ref 1.6–8.3)
NEUTROPHILS NFR BLD AUTO: 66.2 %
NRBC # BLD AUTO: 0 10*3/UL
NRBC BLD AUTO-RTO: 0 /100
PLATELET # BLD AUTO: 304 10E9/L (ref 150–450)
RBC # BLD AUTO: 4.23 10E12/L (ref 3.8–5.2)
WBC # BLD AUTO: 11.8 10E9/L (ref 4–11)

## 2017-04-26 ASSESSMENT — PAIN SCALES - GENERAL: PAINLEVEL: SEVERE PAIN (7)

## 2017-04-26 NOTE — MR AVS SNAPSHOT
After Visit Summary   4/26/2017    Janine Cornell    MRN: [id number]           Patient Information     Date Of Birth          1965        Visit Information        Provider Department      4/26/2017 2:35 PM Kash Solano MD [facility]        Today's Diagnoses     Fibromyalgia    -  1    DM type 2, goal HbA1c 7%-8% (H)           Follow-ups after your visit        Additional Services     PHYSICAL THERAPY REFERRAL       [place] [facility]                  Follow-up notes from your care team     Return in about 1 month (around 5/26/2017).      Your next 10 appointments already scheduled     Jun 01, 2017  3:05 PM CDT   (Arrive by 2:50 PM)   Return Visit with Kash Solano MD   [facility] ([facility])    [address] [place] [address]  4th [facility] [id number] [phone]            Cheo 15, 2017 10:30 AM CDT   (Arrive by 10:15 AM)   Return Visit with Majo Mckinnon MD   [facility] Rheumatology ([facility])    [address] [place] [address]  3rd [facility] [id number] [phone]            Jun 28, 2017  4:00 PM CDT   Lab with  LAB   [facility] Lab ([facility])    [address] [place] [address]  1st [facility] [id number] [phone]            Jun 28, 2017  4:30 PM CDT   (Arrive by 4:15 PM)   Return Visit with Milan Peace MD   [facility] Care ([facility])    [address] [place] [id number] [phone]              Who to contact     Please call your clinic at [phone] to:    Ask questions about your health    Make or cancel appointments    Discuss your medicines    Learn about your test results    Speak to your doctor   If you have compliments or concerns about an experience at your clinic, or if you wish to file a complaint, please contact [facility] Patient Relations at  [phone] or email us at [email or web address]         Additional Information About Your Visit        [employer] Information     [employer] gives you secure access to your electronic health record. If you see a primary care provider, you can also send messages to your care team and make appointments. If you have questions, please call your primary care clinic.  If you do not have a primary care provider, please call [phone] and they will assist you.      [employer] is an electronic gateway that provides easy, online access to your medical records. With [employer], you can request a clinic appointment, read your test results, renew a prescription or communicate with your care team.     To access your existing account, please contact your [facility] or call [phone] for assistance.        Care EveryWhere ID     This is your Care EveryWhere ID. This could be used by other organizations to access your [place] medical records  [id number]        Your Vitals Were     Pulse Temperature Respirations BMI (Body Mass Index)          78 98.3  F (36.8  C) 16 30.29 kg/m2         Blood Pressure from Last 3 Encounters:   05/05/17 109/72   04/26/17 97/64   04/07/17 101/60    Weight from Last 3 Encounters:   05/05/17 76.4 kg (168 lb 6.4 oz)   04/26/17 77.6 kg (171 lb)   04/07/17 76.2 kg (168 lb)              We Performed the Following     PHYSICAL THERAPY REFERRAL          Today's Medication Changes          These changes are accurate as of: 4/26/17 11:59 PM.  If you have any questions, ask your nurse or doctor.               These medicines have changed or have updated prescriptions.        Dose/Directions    ASPIRIN LOW DOSE 81 MG EC tablet   This may have changed:  Another medication with the same name was removed. Continue taking this medication, and follow the directions you see here.   Used for:  Cardiomyopathy, unspecified (H)   Generic drug:  aspirin   Changed by:  Milan Peace  MD SUZE        TAKE 1 TABLET BY MOUTH EVERY DAY   Quantity:  90 tablet   Refills:  3       vitamin D 46561 UNIT capsule   Commonly known as:  ERGOCALCIFEROL   This may have changed:  additional instructions   Used for:  Vitamin D deficiency        Dose:  33051 Units   Take 1 capsule (50,000 Units) by mouth every 7 days Need a Vitamin D level in 2 months.   Quantity:  8 capsule   Refills:  0                Primary Care Provider Office Phone # Fax #    Kash Solano [phone]       [facility] [address] [place] [address]  [facility] [address]        Thank you!     Thank you for choosing [facility]  for your care. Our goal is always to provide you with excellent care. Hearing back from our patients is one way we can continue to improve our services. Please take a few minutes to complete the written survey that you may receive in the mail after your visit with us. Thank you!             Your Updated Medication List - Protect others around you: Learn how to safely use, store and throw away your medicines at www.disposemymeds.org.          This list is accurate as of: 4/26/17 11:59 PM.  Always use your most recent med list.                   Brand Name Dispense Instructions for use    acetaminophen 325 MG tablet    TYLENOL    250 tablet    Take 1-2 tablets (325-650 mg) by mouth every 6 hours as needed for pain (headache)       AEROSPAN 80 MCG/ACT Aers   Generic drug:  Flunisolide HFA      INHALE 2 PUFFS PO BID.  RINSE MOUTH AFTERWARDS       * albuterol 108 (90 BASE) MCG/ACT Inhaler    PROAIR HFA/PROVENTIL HFA/VENTOLIN HFA    3 Inhaler    Inhale 2 puffs into the lungs every 6 hours as needed for shortness of breath / dyspnea or wheezing       * albuterol (2.5 MG/3ML) 0.083% neb solution      INL 1 VIAL VIA NEBULIZATION Q 4 TO 6 HOURS PRN       ASPIRIN LOW DOSE 81 MG EC tablet   Generic drug:  aspirin     90 tablet    TAKE 1 TABLET BY MOUTH EVERY DAY       azelastine 0.1 % spray     ASTELIN    1 Bottle    Spray 2 sprays into both nostrils 2 times daily       blood glucose monitoring lancets     4 Box    Use to test blood sugars 4 times daily or as directed.       blood glucose monitoring meter device kit    no brand specified    1 kit    Use to test blood sugar 4 X times daily or as directed.       blood glucose monitoring test strip    no brand specified    360 each    1 strip by In Vitro route 4 times daily Test as directed. Please dispense three months and three months of refills. Thank you.       clopidogrel 75 MG tablet    PLAVIX    90 tablet    Take 1 tablet (75 mg) by mouth daily       cyclobenzaprine 10 MG tablet    FLEXERIL    180 tablet    Take 1 tablet (10 mg) by mouth 2 times daily as needed for muscle spasms       desonide 0.05 % cream    DESOWEN     Apply topically 2 times daily       dicyclomine 20 MG tablet    BENTYL    60 tablet    Take 1 tablet (20 mg) by mouth 4 times daily as needed       diphenhydrAMINE 25 MG capsule    BENADRYL     TK 1 TO 2 CS PO QHS       EPIPEN 2-RIKY 0.3 MG/0.3ML injection   Generic drug:  EPINEPHrine      INJECT 0.3 MG INTO THE MUSCLE PRF ANAPHYALAXIS       ESOMEPRAZOLE MAGNESIUM PO      Take 20 mg by mouth 2 times daily (before meals)       ferrous gluconate 324 (38 FE) MG tablet    FERGON    180 tablet    Take 1 tablet (324 mg) by mouth 2 times daily       fexofenadine 180 MG tablet    ALLEGRA    90 tablet    Take 1 tablet by mouth daily as needed.       fluconazole 100 MG tablet    DIFLUCAN     Take 100 mg by mouth daily as needed       fluocinolone 0.01 % solution    SYNALAR     Apply topically daily as needed       fluocinonide 0.05 % solution    LIDEX    60 mL    Apply topically 2 times daily To areas of itch on the scalp as needed.       fluticasone 50 MCG/ACT spray    FLONASE     U 2 SPRAYS IEN D.       folic acid 1 MG tablet    FOLVITE    90 tablet    1 tab daily       hydrALAZINE 25 MG tablet    APRESOLINE    90 tablet    Take 0.5 tablets  (12.5 mg) by mouth daily . May take additional one-half tablet once daily if systolic blood pressure >140 mmHg.       hydroxychloroquine 200 MG tablet    PLAQUENIL    180 tablet    Take 2 tablets (400 mg) by mouth daily EYE EXAM DUE/LETTER SENT       insulin glargine 100 UNIT/ML injection     9 mL    Inject 40 Units Subcutaneous daily       insulin pen needle 32G X 4 MM     300 each    Use 1 daily as directed.       ketoconazole 2 % shampoo    NIZORAL     Apply topically daily as needed       Ketoprofen Powd     100 g    Apply 1 g topically 2 times daily as needed       lidocaine 5 % ointment    XYLOCAINE    50 g    Apply 1 g topically 2 times daily as needed for moderate pain       lisinopril-hydrochlorothiazide 20-25 MG per tablet    PRINZIDE/ZESTORETIC    90 tablet    TAKE 1 TABLET BY MOUTH DAILY       Magnesium Oxide -Mg Supplement 250 MG Tabs      TK 1 T PO BID. REDUCE IF STOOLS LOOSEN       metFORMIN 500 MG 24 hr tablet    GLUCOPHAGE-XR    60 tablet    Take 2 tablets (1,000 mg) by mouth daily (with dinner)       methotrexate 2.5 MG tablet CHEMO     16 tablet    Take 4 tablets (10 mg) by mouth once a week Take 4 tablets (10mg) by mouth weekly.       metoprolol 100 MG 24 hr tablet    TOPROL-XL    90 tablet    Take 1 tablet (100 mg) by mouth daily       metroNIDAZOLE 1 % cream    NORITATE     Apply topically daily       mometasone 50 MCG/ACT spray    NASONEX     Spray 2 sprays into both nostrils daily       montelukast 10 MG tablet    SINGULAIR    30 tablet    Take 1 tablet (10 mg) by mouth At Bedtime       nitroglycerin 0.4 MG sublingual tablet    NITROSTAT    25 tablet    Place 1 tablet (0.4 mg) under the tongue every 5 minutes as needed for chest pain       nystatin 375714 UNITS Tabs tablet    MYCOSTATIN     TK 2 TS PO BID       olopatadine 0.1 % ophthalmic solution    PATANOL    5 mL    Place 1 drop into both eyes 2 times daily as needed for allergies.       pravastatin 40 MG tablet    PRAVACHOL    30 tablet     Take 1 tablet (40 mg) by mouth daily       ranitidine 150 MG tablet    ZANTAC    60 tablet    Take 1 tablet (150 mg) by mouth 2 times daily       spironolactone 25 MG tablet    ALDACTONE    60 tablet    Take 2 tablets (50 mg) by mouth daily       * sucralfate 1 GM tablet    CARAFATE    40 tablet    Take 1 tablet (1 g) by mouth 4 times daily as needed       * sucralfate 1 GM tablet    CARAFATE    120 tablet    Take 1 tablet (1 g) by mouth 4 times daily as needed       traMADol 50 MG tablet    ULTRAM    60 tablet    Take 1 tablet (50 mg) by mouth every 8 hours as needed for moderate pain       TUMS 500 MG chewable tablet   Generic drug:  calcium carbonate      Take 3-4 chew tab by mouth daily as needed.       vitamin D 22533 UNIT capsule    ERGOCALCIFEROL    8 capsule    Take 1 capsule (50,000 Units) by mouth every 7 days Need a Vitamin D level in 2 months.       ZOFRAN PO          * Notice:  This list has 4 medication(s) that are the same as other medications prescribed for you. Read the directions carefully, and ask your doctor or other care provider to review them with you.

## 2017-04-26 NOTE — LETTER
Patient:  Janine Cornell  :   1965  MRN:     [id number]        Ms.Lisa CHELLE Cornell  [address] [facility] [address]        May 7, 2017    Dear ,    We are writing to inform you of your test results. They are stable.      Resulted Orders   Albumin level   Result Value Ref Range    Albumin 4.1 3.4 - 5.0 g/dL   ALT   Result Value Ref Range    ALT 25 0 - 50 U/L   AST   Result Value Ref Range    AST 15 0 - 45 U/L   CBC with platelets differential   Result Value Ref Range    WBC 11.8 (H) 4.0 - 11.0 10e9/L    RBC Count 4.23 3.8 - 5.2 10e12/L    Hemoglobin 11.2 (L) 11.7 - 15.7 g/dL    Hematocrit 35.0 35.0 - 47.0 %    MCV 83 78 - 100 fl    MCH 26.5 26.5 - 33.0 pg    MCHC 32.0 31.5 - 36.5 g/dL    RDW 13.4 10.0 - 15.0 %    Platelet Count 304 150 - 450 10e9/L    Diff Method Automated Method     % Neutrophils 66.2 %    % Lymphocytes 22.7 %    % Monocytes 6.5 %    % Eosinophils 3.9 %    % Basophils 0.4 %    % Immature Granulocytes 0.3 %    Nucleated RBCs 0 0 /100    Absolute Neutrophil 7.8 1.6 - 8.3 10e9/L    Absolute Lymphocytes 2.7 0.8 - 5.3 10e9/L    Absolute Monocytes 0.8 0.0 - 1.3 10e9/L    Absolute Eosinophils 0.5 0.0 - 0.7 10e9/L    Absolute Basophils 0.1 0.0 - 0.2 10e9/L    Abs Immature Granulocytes 0.0 0 - 0.4 10e9/L    Absolute Nucleated RBC 0.0    Creatinine   Result Value Ref Range    Creatinine 1.24 (H) 0.52 - 1.04 mg/dL    GFR Estimate 46 (L) >60 mL/min/1.7m2      Comment:      Non  GFR Calc    GFR Estimate If Black 55 (L) >60 mL/min/1.7m2      Comment:       GFR Calc       Majo Mckinnon MD

## 2017-04-26 NOTE — NURSING NOTE
Chief Complaint   Patient presents with     Headache     patient states that she is here to discuss HAs and swelling     DELFINA BIRD at 2:34 PM on 4/26/2017.

## 2017-05-05 ENCOUNTER — OFFICE VISIT (OUTPATIENT)
Dept: RHEUMATOLOGY | Facility: CLINIC | Age: 52
End: 2017-05-05
Attending: INTERNAL MEDICINE
Payer: COMMERCIAL

## 2017-05-05 VITALS
WEIGHT: 168.4 LBS | SYSTOLIC BLOOD PRESSURE: 109 MMHG | OXYGEN SATURATION: 99 % | TEMPERATURE: 98.3 F | DIASTOLIC BLOOD PRESSURE: 72 MMHG | HEIGHT: 63 IN | BODY MASS INDEX: 29.84 KG/M2 | HEART RATE: 79 BPM

## 2017-05-05 DIAGNOSIS — I77.82 ANCA-ASSOCIATED VASCULITIS (H): Primary | ICD-10-CM

## 2017-05-05 PROCEDURE — 99212 OFFICE O/P EST SF 10 MIN: CPT | Mod: ZF

## 2017-05-05 RX ORDER — PREDNISONE 5 MG/1
TABLET ORAL
Qty: 90 TABLET | Refills: 2 | Status: SHIPPED | OUTPATIENT
Start: 2017-05-05 | End: 2017-05-05

## 2017-05-05 RX ORDER — PREDNISONE 10 MG/1
TABLET ORAL
Qty: 60 TABLET | Refills: 1 | Status: SHIPPED | OUTPATIENT
Start: 2017-05-05 | End: 2017-05-05

## 2017-05-05 RX ORDER — AZATHIOPRINE 50 MG/1
50 TABLET ORAL DAILY
Qty: 30 TABLET | Refills: 2 | Status: SHIPPED | OUTPATIENT
Start: 2017-05-05 | End: 2017-06-15

## 2017-05-05 RX ORDER — PREDNISONE 20 MG/1
TABLET ORAL
Qty: 60 TABLET | Refills: 3 | Status: SHIPPED | OUTPATIENT
Start: 2017-05-05 | End: 2017-05-05

## 2017-05-05 RX ORDER — PREDNISONE 10 MG/1
TABLET ORAL
Qty: 60 TABLET | Refills: 1 | Status: SHIPPED | OUTPATIENT
Start: 2017-05-05 | End: 2017-06-15

## 2017-05-05 ASSESSMENT — PAIN SCALES - GENERAL: PAINLEVEL: EXTREME PAIN (9)

## 2017-05-05 NOTE — NURSING NOTE
"Chief Complaint   Patient presents with     RECHECK     Follow up Seronegative rheumatoid arthritis.       Initial /72  Pulse 79  Temp 98.3  F (36.8  C) (Oral)  Ht 1.6 m (5' 3\")  Wt 76.4 kg (168 lb 6.4 oz)  SpO2 99%  BMI 29.83 kg/m2 Estimated body mass index is 29.83 kg/(m^2) as calculated from the following:    Height as of this encounter: 1.6 m (5' 3\").    Weight as of this encounter: 76.4 kg (168 lb 6.4 oz).  Medication Reconciliation: complete   Abigail Viveros CMA    "

## 2017-05-05 NOTE — PATIENT INSTRUCTIONS
Prednisone taper: 40-30-20-10 mg a day each for 3 days then stop    Stop methotrexate    Start imuran 50 mg a day    Neuro-ophthalmology referral    MRI brain and orbit with contrast    Labs in 4 weeks    6/15 10:30 am

## 2017-05-05 NOTE — MR AVS SNAPSHOT
After Visit Summary   5/5/2017    Janine Cornell    MRN: [id number]           Patient Information     Date Of Birth          1965        Visit Information        Provider Department      5/5/2017 2:30 PM Majo Mckinnon MD [facility] Rheumatology        Today's Diagnoses     ANCA-associated vasculitis (H)    -  1      Care Instructions    Prednisone taper: 40-30-20-10 mg a day each for 3 days then stop    Stop methotrexate    Start imuran 50 mg a day    Neuro-ophthalmology referral    MRI brain and orbit with contrast    Labs in 4 weeks    6/15 10:30 am            Follow-ups after your visit        Additional Services     OPHTHALMOLOGY ADULT REFERRAL       Your provider has referred you to: neuro-ophthalmology Dr. Vernon next week (asap) HAS PSEUDOTUMOR IN R ORBIT DUE TO ANCA VASCULITIS    Please be aware that coverage of these services is subject to the terms and limitations of your health insurance plan.  Call member services at your health plan with any benefit or coverage questions.      Please bring the following with you to your appointment:    (1) Any X-Rays, CTs or MRIs which have been performed.  Contact the facility where they were done to arrange for  prior to your scheduled appointment.    (2) List of current medications  (3) This referral request   (4) Any documents/labs given to you for this referral                  Your next 10 appointments already scheduled     May 09, 2017  9:00 AM CDT   NEW NEURO with Jas Vernon MD   Eye Clinic ([facility])    [facility] Enri[facility] [address]  [facility] [address]   [phone]            May 09, 2017  3:00 PM CDT   MR BRAIN AND ORBITS with UUMR1   [facility] [place] ([facility])    [address] [facility] [address]   [phone]           Take your medicines as usual, unless your doctor tells you not to. Bring a list of your  current medicines to your exam (including vitamins, minerals and over-the-counter drugs). Also bring the results of similar scans you may have had.  Please remove any body piercings and hair extensions before you arrive.  Follow your doctor s orders. If you do not, we may have to postpone your exam.  You will not have contrast for this exam. You do not need to do anything special to prepare.  The MRI machine uses a strong magnet. Please wear clothes without metal (snaps, zippers). A sweatsuit works well, or we may give you a hospital gown.   **IMPORTANT** THE INSTRUCTIONS BELOW ARE ONLY FOR THOSE PATIENTS WHO HAVE BEEN TOLD THEY WILL RECEIVE SEDATION OR GENERAL ANESTHESIA DURING THEIR MRI PROCEDURE:  IF YOU WILL RECEIVE SEDATION (take medicine to help you relax during your exam):   You must get the medicine from your doctor before you arrive. Bring the medicine to the exam. Do not take it at home.   Arrive one hour early. Bring someone who can take you home after the test. Your medicine will make you sleepy. After the exam, you may not drive, take a bus or take a taxi by yourself.   No eating 8 hours before your exam. You may have clear liquids up until 4 hours before your exam. (Clear liquids include water, clear tea, black coffee and fruit juice without pulp.)  IF YOU WILL RECEIVE ANESTHESIA (be asleep for your exam):   Arrive 1 1/2 hours early. Bring someone who can take you home after the test. You may not drive, take a bus or take a taxi by yourself.   No eating 8 hours before your exam. You may have clear liquids up until 4 hours before your exam. (Clear liquids include water, clear tea, black coffee and fruit juice without pulp.)   You will spend four to five hours in the recovery room.  Please call the Imaging Department at your exam site with any questions.            Jun 01, 2017  3:05 PM CDT   (Arrive by 2:50 PM)   Return Visit with Kash Solano MD   [facility] ([facility]  [facility])    [address] [facility]  [address] [facility] [address]   [phone]            Cheo 15, 2017 10:30 AM CDT   (Arrive by 10:15 AM)   Return Visit with Majo Mckinnon MD   [facility] Rhe[facility] ([facility])    [address] [place] [address]  3rd [facility] [address]   [phone]            Jun 28, 2017  4:00 PM CDT   Lab with  LAB   [facility] ([facility])    [address] [place] [address]  1st [facility] [address]   [phone]            Jun 28, 2017  4:30 PM CDT   (Arrive by 4:15 PM)   Return Visit with Milan Peace MD   [facility] ([facility])    [address] [place] [address]  [address] [facility] [address]   [phone]              Future tests that were ordered for you today     Open Future Orders        Priority Expected Expires Ordered    MR Brain and Orbits ASAP  5/5/2018 5/5/2017    MR Brain w/o & w Contrast Routine  5/5/2018 5/5/2017    ALT Routine 6/2/2017 5/5/2018 5/5/2017    AST Routine 6/2/2017 5/5/2018 5/5/2017    Albumin level Routine 6/2/2017 5/5/2018 5/5/2017    CBC with platelets differential Routine 6/2/2017 5/5/2018 5/5/2017    Creatinine Routine 6/2/2017 5/5/2018 5/5/2017    CRP inflammation Routine 6/2/2017 5/5/2018 5/5/2017    Erythrocyte sedimentation rate auto Routine 6/2/2017 5/5/2018 5/5/2017    Routine UA with Micro Reflex to Culture Routine 6/2/2017 5/5/2018 5/5/2017    Protein  random urine Routine 6/2/2017 5/5/2018 5/5/2017    Creatinine random urine Routine 6/2/2017 5/5/2018 5/5/2017    Antineutrophil cytoplasmic Marline IgG Routine 6/2/2017 5/5/2018 5/5/2017    Vasculitis panel Routine 6/2/2017 5/5/2018 5/5/2017            Who to contact     If you have questions or need follow up information about today's clinic visit or your schedule please contact [facility] RHEUMATOLOG[facility] directly at [phone].  Normal or non-critical lab and imaging results will be  "communicated to you by MileIQhart, letter or phone within 4 business days after the clinic has received the results. If you do not hear from us within 7 days, please contact the clinic through cookdinner or phone. If you have a critical or abnormal lab result, we will notify you by phone as soon as possible.  Submit refill requests through cookdinner or call your pharmacy and they will forward the refill request to us. Please allow 3 business days for your refill to be completed.          Additional Information About Your Visit        cookdinner Information     cookdinner gives you secure access to your electronic health record. If you see a primary care provider, you can also send messages to your care team and make appointments. If you have questions, please call your primary care clinic.  If you do not have a primary care provider, please call [phone] and they will assist you.        Care EveryWhere ID     This is your Care EveryWhere ID. This could be used by other organizations to access your [place] medical records  [id number]        Your Vitals Were     Pulse Temperature Height Pulse Oximetry BMI (Body Mass Index)       79 98.3  F (36.8  C) (Oral) 1.6 m (5' 3\") 99% 29.83 kg/m2        Blood Pressure from Last 3 Encounters:   05/05/17 109/72   04/26/17 97/64   04/07/17 101/60    Weight from Last 3 Encounters:   05/05/17 76.4 kg (168 lb 6.4 oz)   04/26/17 77.6 kg (171 lb)   04/07/17 76.2 kg (168 lb)              We Performed the Following     OPHTHALMOLOGY ADULT REFERRAL          Today's Medication Changes          These changes are accurate as of: 5/5/17  4:19 PM.  If you have any questions, ask your nurse or doctor.               Start taking these medicines.        Dose/Directions    azaTHIOprine 50 MG tablet   Commonly known as:  IMURAN   Used for:  ANCA-associated vasculitis (H)        Dose:  50 mg   Take 1 tablet (50 mg) by mouth daily TAKE BY MOUTH WITH FOOD.   Quantity:  30 tablet   Refills:  2       predniSONE " 10 MG tablet   Commonly known as:  DELTASONE   Used for:  ANCA-associated vasculitis (H)        40-30-20-10 mg a day each for 3 days then stop   Quantity:  60 tablet   Refills:  1         These medicines have changed or have updated prescriptions.        Dose/Directions    vitamin D 85465 UNIT capsule   Commonly known as:  ERGOCALCIFEROL   This may have changed:  additional instructions   Used for:  Vitamin D deficiency        Dose:  45790 Units   Take 1 capsule (50,000 Units) by mouth every 7 days Need a Vitamin D level in 2 months.   Quantity:  8 capsule   Refills:  0         Stop taking these medicines if you haven't already. Please contact your care team if you have questions.     folic acid 1 MG tablet   Commonly known as:  FOLVITE           methotrexate 2.5 MG tablet CHEMO                Where to get your medicines      These medications were sent to [facility] [address] [facility] [phone]  [address] [facility] [phone] [address]    Hours:  TRANSPLANT PHONE NUMBER [phone] Phone:  [phone]     predniSONE 10 MG tablet         These medications were sent to [facility] [address] [place] [address] AT [address] [place] [address]     Phone:  [phone]     azaTHIOprine 50 MG tablet                Primary Care Provider Office Phone # Fax #    Kash Solano [phone]       [facility] [address] [place] [address]        Thank you!     Thank you for choosing [facility] RHEUMATOLOGY  for your care. Our goal is always to provide you with excellent care. Hearing back from our patients is one way we can continue to improve our services. Please take a few minutes to complete the written survey that you may receive in the mail after your visit with us. Thank you!             Your Updated Medication List - Protect others around you: Learn how to  safely use, store and throw away your medicines at www.disposemymeds.org.          This list is accurate as of: 5/5/17  4:19 PM.  Always use your most recent med list.                   Brand Name Dispense Instructions for use    acetaminophen 325 MG tablet    TYLENOL    250 tablet    Take 1-2 tablets (325-650 mg) by mouth every 6 hours as needed for pain (headache)       AEROSPAN 80 MCG/ACT Aers   Generic drug:  Flunisolide HFA      INHALE 2 PUFFS PO BID.  RINSE MOUTH AFTERWARDS       * albuterol 108 (90 BASE) MCG/ACT Inhaler    PROAIR HFA/PROVENTIL HFA/VENTOLIN HFA    3 Inhaler    Inhale 2 puffs into the lungs every 6 hours as needed for shortness of breath / dyspnea or wheezing       * albuterol (2.5 MG/3ML) 0.083% neb solution      INL 1 VIAL VIA NEBULIZATION Q 4 TO 6 HOURS PRN       ASPIRIN LOW DOSE 81 MG EC tablet   Generic drug:  aspirin     90 tablet    TAKE 1 TABLET BY MOUTH EVERY DAY       azaTHIOprine 50 MG tablet    IMURAN    30 tablet    Take 1 tablet (50 mg) by mouth daily TAKE BY MOUTH WITH FOOD.       azelastine 0.1 % spray    ASTELIN    1 Bottle    Spray 2 sprays into both nostrils 2 times daily       blood glucose monitoring lancets     4 Box    Use to test blood sugars 4 times daily or as directed.       blood glucose monitoring meter device kit    no brand specified    1 kit    Use to test blood sugar 4 X times daily or as directed.       blood glucose monitoring test strip    no brand specified    360 each    1 strip by In Vitro route 4 times daily Test as directed. Please dispense three months and three months of refills. Thank you.       clopidogrel 75 MG tablet    PLAVIX    90 tablet    Take 1 tablet (75 mg) by mouth daily       cyclobenzaprine 10 MG tablet    FLEXERIL    180 tablet    Take 1 tablet (10 mg) by mouth 2 times daily as needed for muscle spasms       desonide 0.05 % cream    DESOWEN     Apply topically 2 times daily       dicyclomine 20 MG tablet    BENTYL    60 tablet    Take 1  tablet (20 mg) by mouth 4 times daily as needed       diphenhydrAMINE 25 MG capsule    BENADRYL     TK 1 TO 2 CS PO QHS       EPIPEN 2-RIKY 0.3 MG/0.3ML injection   Generic drug:  EPINEPHrine      INJECT 0.3 MG INTO THE MUSCLE PRF ANAPHYALAXIS       ESOMEPRAZOLE MAGNESIUM PO      Take 20 mg by mouth 2 times daily (before meals)       ferrous gluconate 324 (38 FE) MG tablet    FERGON    180 tablet    Take 1 tablet (324 mg) by mouth 2 times daily       fexofenadine 180 MG tablet    ALLEGRA    90 tablet    Take 1 tablet by mouth daily as needed.       fluconazole 100 MG tablet    DIFLUCAN     Take 100 mg by mouth daily as needed       fluocinolone 0.01 % solution    SYNALAR     Apply topically daily as needed       fluocinonide 0.05 % solution    LIDEX    60 mL    Apply topically 2 times daily To areas of itch on the scalp as needed.       fluticasone 50 MCG/ACT spray    FLONASE     U 2 SPRAYS IEN D.       hydrALAZINE 25 MG tablet    APRESOLINE    90 tablet    Take 0.5 tablets (12.5 mg) by mouth daily . May take additional one-half tablet once daily if systolic blood pressure >140 mmHg.       hydroxychloroquine 200 MG tablet    PLAQUENIL    180 tablet    Take 2 tablets (400 mg) by mouth daily EYE EXAM DUE/LETTER SENT       insulin glargine 100 UNIT/ML injection     9 mL    Inject 40 Units Subcutaneous daily       insulin pen needle 32G X 4 MM     300 each    Use 1 daily as directed.       ketoconazole 2 % shampoo    NIZORAL     Apply topically daily as needed       Ketoprofen Powd     100 g    Apply 1 g topically 2 times daily as needed       lidocaine 5 % ointment    XYLOCAINE    50 g    Apply 1 g topically 2 times daily as needed for moderate pain       lisinopril-hydrochlorothiazide 20-25 MG per tablet    PRINZIDE/ZESTORETIC    90 tablet    TAKE 1 TABLET BY MOUTH DAILY       Magnesium Oxide -Mg Supplement 250 MG Tabs      TK 1 T PO BID. REDUCE IF STOOLS LOOSEN       metFORMIN 500 MG 24 hr tablet    GLUCOPHAGE-XR     60 tablet    Take 2 tablets (1,000 mg) by mouth daily (with dinner)       metoprolol 100 MG 24 hr tablet    TOPROL-XL    90 tablet    Take 1 tablet (100 mg) by mouth daily       metroNIDAZOLE 1 % cream    NORITATE     Apply topically daily       mometasone 50 MCG/ACT spray    NASONEX     Spray 2 sprays into both nostrils daily       montelukast 10 MG tablet    SINGULAIR    30 tablet    Take 1 tablet (10 mg) by mouth At Bedtime       nitroglycerin 0.4 MG sublingual tablet    NITROSTAT    25 tablet    Place 1 tablet (0.4 mg) under the tongue every 5 minutes as needed for chest pain       nystatin 507933 UNITS Tabs tablet    MYCOSTATIN     TK 2 TS PO BID       olopatadine 0.1 % ophthalmic solution    PATANOL    5 mL    Place 1 drop into both eyes 2 times daily as needed for allergies.       pravastatin 40 MG tablet    PRAVACHOL    30 tablet    Take 1 tablet (40 mg) by mouth daily       predniSONE 10 MG tablet    DELTASONE    60 tablet    40-30-20-10 mg a day each for 3 days then stop       ranitidine 150 MG tablet    ZANTAC    60 tablet    Take 1 tablet (150 mg) by mouth 2 times daily       spironolactone 25 MG tablet    ALDACTONE    60 tablet    Take 2 tablets (50 mg) by mouth daily       * sucralfate 1 GM tablet    CARAFATE    40 tablet    Take 1 tablet (1 g) by mouth 4 times daily as needed       * sucralfate 1 GM tablet    CARAFATE    120 tablet    Take 1 tablet (1 g) by mouth 4 times daily as needed       traMADol 50 MG tablet    ULTRAM    60 tablet    Take 1 tablet (50 mg) by mouth every 8 hours as needed for moderate pain       TUMS 500 MG chewable tablet   Generic drug:  calcium carbonate      Take 3-4 chew tab by mouth daily as needed.       vitamin D 35002 UNIT capsule    ERGOCALCIFEROL    8 capsule    Take 1 capsule (50,000 Units) by mouth every 7 days Need a Vitamin D level in 2 months.       ZOFRAN PO          * Notice:  This list has 4 medication(s) that are the same as other medications prescribed for  you. Read the directions carefully, and ask your doctor or other care provider to review them with you.

## 2017-05-05 NOTE — PROGRESS NOTES
Rheumatology F/U Note    Last visit note: 4/7/2017        Today's visit date: 5/5/2017    Reason for visit: RA, Fibromyalgia     HPI from last visit    Ms. Cornell is a 51 yo AAF who was referred to our clinic for evaluation and management of her joint pain in setting of positive RF 26.    Her joint symptoms first started more than a year ago, but over last 6-8 months fluctuating some good and bad days . All her joints are involved. Over last 5 months, hands became swollen, warm and painful. Tylenol does not help. Ketoprofen did not help. Was told to avoid NSAIDs given ACS. Reports AM/PM stiffness x 2-3 hr, can't make full fist when wakes up in AM.    She feels tired all the time. Reports worsening hair loss and unchanged  facial rash. Gets red sore flaky rash over cheeks across her face which is intermittent diagnosed Rosacea and is prescribed metronidazole gel which she has not started using. Reports occasional oral ulcers. Hands feel cold with red discoloration last winter. Has occasional dysphagia to both solids and liquid. Has dry eyes, is using OTC allergy eyedrops. Has chronic abdominal pain. Has h/o pancreatitis. Sometime has anxiety. Occasionally has numbness, tingling in fingers/toes. Has back and spine issues, her whole back and neck hurts, different areas at different time. She is wondering if she has FMS. Has hard time sleeping, has never been diagnosed with BRENNA. She does not know if he snores. She was found to have slightly positive RF in 2/2013. Has microcytic anemia.     Her arthralgia gets worse with drop in temperature. Reports body ache. Activity makes her pain worse. Her joint swelling isintermittent. Her body pain and joint pain is the same. Reports AM stiffness x 3 hr.    She has been doing acupuncture x few months and it is helping with her pain. She thinks that the combination of plaquenil and acupuncture is helpful but overall she notice 30% in her symptoms relief.     Takes tylenol and  tramadol on occasion for pain.    She decided to use different formulation of metformin which helped with her abdominal pain. I referred her to GI for evaluation of elevated lipase and abdominal pain. She decided to see GI in the future.       She is on  mg qd since 5/2014, tolerates it well and it helps her some. Denies taking any gabapentin due to chest pain and headches. She has had referral her for water aerobics, but she can't begin until she's healed from recent surgery in 10/2014. She thinks HCQ is helping but not enough to control all her pain, reports 2-3 hr of AM stiffness with ongoing diffuse arthralgia/myalgia along with intermittent swelling of hands. She does see her acupuncture person and it helps with her pain, has not started water aerobics yet. Has got her flu shot.       10/2015: Reports having pleuritic CP in 3/2015, was prescribed Lidoderm patches first. It did not help. Took prednisone (?dose) by her own, pain got better but it recurred off prednisone and gradually got worse to the point that she could not stand the pain anymore, was seen in ER in 5/2015, was treated with medrol dose pack which helped. Reports pain over hips, knees, ankles and fingers. Pain gets worse with walking. Reports myalgia, swelling of the arms. Pain over neck, shoulders. Has AM stiffness x 3-4 hr. Fingers swell up and become painful. Can't remember if steroid helped with joint pain. She had headaches, severe CP when she took tramadol and gabapentin and stopped taking them, sx resolved but she can't tell which one caused SEs but thinks that probably it was gabapentin. She has good days and tries to be more active but activity aggravates the pain. Headaches are intermittent. HCQ helps some not enough to control all the pain. No HCQ SEs. Had eye exam around July 2015. Flexeril helps but PCP wants to change flexeril to zanaflex, reporting that she is NOT comfortable with the change. Acupuncture still helps an she  continued to  do that. Concerned about fat pads she has in supraclavicular area, has h/o thyroid nodules. Continues having hair loss, takes iron for iron deficiency.     2/2016: She has 2 major complaints today, increased joint pain/swelling in hands/feet and recent Dx of optic neuritis in R eye, reports having similar problem about 20 yr ago, now recurred. Has a spot in R eye vision x long term, was seen by ophthalmology few days ago and was told to have optic neuritis. Gets joint pain, muscle pain. Can't  objects, hands are swollen. Knees, hips and ankles are painful. Reports more arthralgia. AM stiffness/ pain is 3-4 hr. She wants me to talk to her ophthalmologist directly.      She had botox inj for migraines which did not help her. She is going to have angiogram next wk, had to take more nitro for CP recently.       4/29/2016: She reports being on steroid taper x 3 since last visit. Reportedly, had orbit MRI, it showed swelling/inflamamtion of R lacrimal glands. She had painful swelling of her R eye, cause her headaches. Botox inj made her headaches worse. She is being dealing with this since 1/2016, with severe headaches and pain/swelling around R eye. Had biopsy in 3/2016. She is frustrated as prednisone causes her weight gain and her BG is difficult to control on it.    5/2016: At last visit, was prescribed MTX 10 mg po qwk to take along with FA 1 mg qd. She decided not to take MTX, is afraid of side effects, believes all these medications would harm her. She also believes that botox caused her inflammation around R eye. No major flare up of per-orbital inflamamtion since last visit but continues to have swelling around her R eye.      11/2016: Reports diffuse body ache, arthralgia, myalgia especially with weather change. No major flare up of campos-orbital inflammation but her face/around R eye swells up from time to time. Reports 2 episodes of CP over past 2 mo, nitro sometimes helps and sometimes does  not help. She has asthma and costochondritis and she has hard time to distinguish the origin of pain. Sometimes knees and fingers swell up. Her stiffness is sometimes all day.    4/2017:  Reports having sinusitis since last visit. Her R eye is dry and is using eyedrops to keep it moist. Reports having pain in ears. Was treated with antibiotics by PCP, it got better then it got worse. Reports catching infections easily. Then started having pressure over eyes with pain/headaches (exact start date is unknown). Pain gradually got worse and became persistent. Had sinus CT.     4/7/2017: Having a flare up of swelling, pain around her R orbit. Has not tried MTX yet.      Today: On MTX 10 mg po qwk since 4/7/2017, reports increased stomach pain and nausea and new headaches since start of MTX and it's not helping. Pain/swelling around R orbit is worse.    Her records were reviewed.        Component      Latest Ref Rng 2/28/2013 2/28/2013          12:14 PM 12:14 PM   WBC      4.0 - 11.0 10e9/L     RBC Count      3.8 - 5.2 10e12/L     Hemoglobin      11.7 - 15.7 g/dL     Hematocrit      35.0 - 47.0 %     MCV      78 - 100 fl     MCH      26.5 - 33.0 pg     MCHC      31.5 - 36.5 g/dL     RDW      10.0 - 15.0 %     Platelet Count      150 - 450 10e9/L     Specimen Description           Lyme Screen IgG and IgM           Vitamin D Deficiency screening      30 - 75 ug/L     Ferritin      10 - 300 ng/mL     Sed Rate      0 - 20 mm/h     ALLI Screen by EIA      <1.0     Rheumatoid Factor      0 - 14 IU/mL     CK Total      30 - 225 U/L  78   Uric Acid      2.5 - 7.5 mg/dL 6.7      Component      Latest Ref Rng 2/28/2013          12:14 PM   WBC      4.0 - 11.0 10e9/L    RBC Count      3.8 - 5.2 10e12/L    Hemoglobin      11.7 - 15.7 g/dL    Hematocrit      35.0 - 47.0 %    MCV      78 - 100 fl    MCH      26.5 - 33.0 pg    MCHC      31.5 - 36.5 g/dL    RDW      10.0 - 15.0 %    Platelet Count      150 - 450 10e9/L    Specimen  Description       Serum   Lyme Screen IgG and IgM       Test value: <0.75....Interpretation: Negative....If you highly suspect Lyme . . .   Vitamin D Deficiency screening      30 - 75 ug/L    Ferritin      10 - 300 ng/mL    Sed Rate      0 - 20 mm/h    ALLI Screen by EIA      <1.0    Rheumatoid Factor      0 - 14 IU/mL    CK Total      30 - 225 U/L    Uric Acid      2.5 - 7.5 mg/dL      Component      Latest Ref Rn 2/28/2013 2/28/2013 2/28/2013 2/28/2013          12:14 PM 12:14 PM 12:14 PM 12:14 PM   WBC      4.0 - 11.0 10e9/L       RBC Count      3.8 - 5.2 10e12/L       Hemoglobin      11.7 - 15.7 g/dL       Hematocrit      35.0 - 47.0 %       MCV      78 - 100 fl       MCH      26.5 - 33.0 pg       MCHC      31.5 - 36.5 g/dL       RDW      10.0 - 15.0 %       Platelet Count      150 - 450 10e9/L       Specimen Description             Lyme Screen IgG and IgM             Vitamin D Deficiency screening      30 - 75 ug/L       Ferritin      10 - 300 ng/mL    10   Sed Rate      0 - 20 mm/h   23 (H)    ALLI Screen by EIA      <1.0  <1.0 . . .     Rheumatoid Factor      0 - 14 IU/mL 26 (H)      CK Total      30 - 225 U/L       Uric Acid      2.5 - 7.5 mg/dL         Component      Latest Ref [facility] 2/28/2013 2/28/2013          12:14 PM 12:14 PM   WBC      4.0 - 11.0 10e9/L 14.1 (H)    RBC Count      3.8 - 5.2 10e12/L 4.55    Hemoglobin      11.7 - 15.7 g/dL 10.7 (L)    Hematocrit      35.0 - 47.0 % 33.3 (L)    MCV      78 - 100 fl 73 (L)    MCH      26.5 - 33.0 pg 23.5 (L)    MCHC      31.5 - 36.5 g/dL 32.1    RDW      10.0 - 15.0 % 18.1 (H)    Platelet Count      150 - 450 10e9/L 407    Specimen Description           Lyme Screen IgG and IgM           Vitamin D Deficiency screening      30 - 75 ug/L  32   Ferritin      10 - 300 ng/mL     Sed Rate      0 - 20 mm/h     ALLI Screen by EIA      <1.0     Rheumatoid Factor      0 - 14 IU/mL     CK Total      30 - 225 U/L     Uric Acid      2.5 - 7.5 mg/dL          MRI CERVICAL SPINE  WITHOUT CONTRAST 4/3/2013 12:47 PM    HISTORY: Cervicalgia. Degeneration of cervical intervertebral disc.  MRI cervical spine. Evaluate bilateral supraclavicular lymph nodes.  Clinical enlargement.    TECHNIQUE: Multiplanar multisequence MRI of the cervical spine  without gadolinium contrast.    COMPARISON: None.    FINDINGS: The patient has a developmentally small central canal.  Images of the cervical cord reveals small areas of T2 hyperintensity  within the cervical cord. There is a small area of high signal  intensity at the C1 level. There is also a small area of high signal  intensity at the C6-C7 level. The area of high signal at C6-C7 is  located at an area of central spinal stenosis. This may be due to  myelomalacia. The area of high signal at C1-C2 is indeterminate.    C2-C3: Normal disc, facet joints, spinal canal and neural foramina.    C3-C4: Normal disc, facet joints, spinal canal and neural foramina.    C4-C5: Normal disc, facet joints, spinal canal and neural foramina.    C5-C6: There is degeneration of the disc. There is a mild annular  disc bulge. The central canal is mild-moderately narrowed. The  residual AP diameter of the central spinal canal measures  approximately 9 mm.    C6-C7: There is degeneration of the disc. There is loss of disc space  height. There is a diffuse annular disc bulge. There are associated  posterior osteophytes. There are severe central spinal stenosis at  this level. The residual AP diameter of the central spinal canal is  only about 6 mm. There is significant flattening of the cord. There  is high signal in the cord at this level consistent with  myelomalacia. There is moderate to severe right foraminal stenosis  due to and uncinate spur.    C7-T1: Normal disc, facet joints, spinal canal and neural foramina.            Result Impression       IMPRESSION:  1. Severe central spinal stenosis at C6-C7 due to a developmentally  small canal and due to a bulging disc and  associated posterior  osteophyte. There is flattening of the cord at this level and high  signal in the cord at this level consistent with myelomalacia.  2. There is a small, indeterminate 2-3 mm area of high signal  intensity in the cord at the C1 level. This could be due to gliosis  secondary to a previous infectious or inflammatory process.  Demyelinating disease could also have a similar appearance. Clinical  correlation suggested.    GAIL MORE MD     MRI LEFT UPPER EXTREMITY NON-JOINT WITHOUT CONTRAST, MRI RIGHT UPPER  EXTREMITY NON-JOINT WITHOUT CONTRAST April 3, 2013 1:32 PM    HISTORY: Cervicalgia. Degeneration of cervical intervertebral disc.    TECHNIQUE: Multiplanar, multisequence imaging of the brachial plexus  was performed without gadolinium contrast enhancement.    COMPARISON: None.    FINDINGS: No mass lesions are seen. No inflammatory process is  identified. The roots, trunks, branches, and divisions of both the  right and left brachial plexus appear normal. No adenopathy is seen.  The anterior scalene muscles and the middle scalene muscles appear  normal. Nodules are seen within the thyroid gland. The largest nodule  is seen in the left lobe of the thyroid. This measures about 1.8 cm  in diameter.            Result Impression       IMPRESSION:  1. Both the right and left brachial plexus regions appear normal.  2. Thyroid nodules. The largest nodule is in the left lobe of the  thyroid. It measures about 1.8 cm in diameter.       XR WRIST BILATERAL G/E 3 VIEWS    Narrative:        EXAMINATION:  1. Each hand 3 views  2. Each wrist 3 views     DATE: 5/1/2013     HISTORY: Arthropathy with concern for rheumatoid.     FINDINGS: 3 views of each hand and 3 views of each wrist are obtained  without prior for comparison. Alignment is normal. There is no  fracture or acute bone abnormality. No distinct erosions are seen.  Some spurring of the distal radial ulnar joint is noted on the right.       Impression:      IMPRESSION:  1. No evidence of an inflammatory arthropathy involving either hand  or wrist.     VIELKA GUEVARA MD         XR FOOT BILATERAL G/E 3 VIEWS    Narrative:        EXAMINATION:  1. Each foot 3 views  2. Each ankle 3 views  3. Sacroiliac joint series     DATE: 5/1/2013     HISTORY: Arthropathy; concern for rheumatoid.     FINDINGS: No prior for comparison.     A frontal and bilateral oblique evaluation of the sacroiliac joints  shows no malalignment. Some patchy sclerosis is identified along the  central aspect of both sacroiliac joints, which is favored to be  degenerative. No distinct erosions are seen. The visualized portion  of the hip joint spaces appear maintained, with no erosive changes  identified. Mild endplate spurring is noted in the lower lumbar  spine.     3 views of each foot and 3 views of each ankle show no evidence of  acute fracture or dislocation. The metatarsal phalangeal,  tarsometatarsal and intertarsal joint spaces appear maintained. No  erosions are identified. There is no sign of acroosteolysis. The  ankle mortise and talar dome are intact bilaterally. Minimal spurring  is noted along the tip of the medial malleolus bilaterally.       Impression:     IMPRESSION:  1. Patchy sclerosis identified along the central aspect of both  sacroiliac joints, which is favored to be degenerative. No distinct  erosions are seen.  2. No evidence of an inflammatory arthropathy in either foot or ankle.     VIELKA GUEVARA MD     5/2013  CBC WITH PLATELETS DIFFERENTIAL       Component Value Range    WBC 11.3 (*) 4.0 - 11.0 10e9/L    RBC Count 4.56  3.8 - 5.2 10e12/L    Hemoglobin 11.1 (*) 11.7 - 15.7 g/dL    Hematocrit 34.3 (*) 35.0 - 47.0 %    MCV 75 (*) 78 - 100 fl    MCH 24.3 (*) 26.5 - 33.0 pg    MCHC 32.4  31.5 - 36.5 g/dL    RDW 16.1 (*) 10.0 - 15.0 %    Platelet Count 315  150 - 450 10e9/L    Diff Method Automated Method      % Neutrophils 71.6  40 - 75 %    % Lymphocytes 20.9  20 - 48 %    %  Monocytes 4.3  0 - 12 %    % Eosinophils 2.8  0 - 6 %    % Basophils 0.2  0 - 2 %    % Immature Granulocytes 0.2  0 - 0.4 %    Absolute Neutrophil 8.1  1.6 - 8.3 10e9/L    Absolute Lymphocytes 2.4  0.8 - 5.3 10e9/L    Absolute Monoctyes 0.5  0.0 - 1.3 10e9/L    Absolute Eosinophils 0.3  0.0 - 0.7 10e9/L    Absolute Basophils 0.0  0.0 - 0.2 10e9/L    Abs Immature Granulocytes 0.0  0 - 0.03 10e9/L   AMYLASE       Component Value Range    Amylase 103  30 - 110 U/L   COMPREHENSIVE METABOLIC PANEL       Component Value Range    Sodium 144  133 - 144 mmol/L    Potassium 3.8  3.4 - 5.3 mmol/L    Chloride 105  94 - 109 mmol/L    Carbon Dioxide 23  20 - 32 mmol/L    Anion Gap 17  6 - 17 mmol/L    Glucose 173 (*) 60 - 99 mg/dL    Urea Nitrogen 13  5 - 24 mg/dL    Creatinine 0.83  0.52 - 1.04 mg/dL    GFR Estimate 74  >60 mL/min/1.7m2    GFR Estimate If Black 90  >60 mL/min/1.7m2    Calcium 9.7  8.5 - 10.4 mg/dL    Bilirubin Total 0.4  0.2 - 1.3 mg/dL    Albumin 4.3  3.9 - 5.1 g/dL    Protein Total 7.8  6.8 - 8.8 g/dL    Alkaline Phosphatase 66  40 - 150 U/L    ALT 36  0 - 50 U/L    AST 28  0 - 45 U/L   CK TOTAL       Component Value Range    CK Total 66  30 - 225 U/L   CRP INFLAMMATION       Component Value Range    CRP Inflammation 10.4 (*) 0.0 - 8.0 mg/L   LIPASE       Component Value Range    Lipase 353 (*) 20 - 250 U/L   ERYTHROCYTE SEDIMENTATION RATE AUTO       Component Value Range    Sed Rate 26 (*) 0 - 20 mm/h   ROUTINE UA WITH MICROSCOPIC REFLEX TO CULTURE       Component Value Range    Color Urine Yellow      Appearance Urine Slightly Cloudy      Glucose Urine 30 (*) NEG mg/dL    Bilirubin Urine Negative  NEG    Ketones Urine 5 (*) NEG mg/dL    Specific Gravity Urine 1.026  1.003 - 1.035    Blood Urine Trace (*) NEG    pH Urine 5.0  5.0 - 7.0 pH    Protein Albumin Urine 10 (*) NEG mg/dL    Urobilinogen mg/dL Normal  0.0 - 2.0 mg/dL    Nitrite Urine Negative  NEG    Leukocyte Esterase Urine Negative  NEG    Source  Midstream Urine      WBC Urine 1  0 - 2 /HPF    RBC Urine 4 (*) 0 - 2 /HPF    Squamous Epithelial /HPF Urine <1  0 - 1 /HPF    Mucous Urine Present (*) NEG /LPF    Hyaline Casts 3 (*) 0 - 2 /LPF    Calcium Oxalate Moderate (*) NEG /HPF   COMPLEMENT C3       Component Value Range    Complement C3 143  76 - 169 mg/dL   COMPLEMENT C4       Component Value Range    Complement C4 31  15 - 50 mg/dL   HEPATITIS C ANTIBODY       Component Value Range    Hepatitis C Antibody Negative  NEG   CARDIOLIPIN ANTIBODY IGG AND IGM       Component Value Range    Cardiolipin IgG Marline    0 - 15.0 GPL    Value: <15.0      Interpretation:  Negative    Cardiolipin IgM Marline    0 - 12.5 MPL    Value: <12.5      Interpretation:  Negative   LUPUS PANEL       Component Value Range    Lupus Result    NEG    Value: Negative      (Note)      COMMENTS:      The INR is normal.      APTT is normal.  1:2 Mix is not indicated.      DRVVT Screen is normal.      Thrombin time is normal.      NEGATIVE TEST; A LUPUS ANTICOAGULANT WAS NOT DETECTED IN THIS      SPECIMEN WITHIN THE LIMITS OF THE TESTING REPERTOIRE.      If the clinical picture is strongly suggestive of an antiphospholipid      syndrome, recommend anticardiolipin and beta-2-glycoprotein (IgG and      IgM) antibody tests.      Leela Franks M.D.  [phone]      5/2/2013            INR =  0.93 N = 0.86-1.14  (No additional charge)      TT = 15.7 N = 13.0-19.0 sec  (No additional charge)            APTT'S:    Seconds      Reagent =  Stago LA      Normal  =  38      Patient  =  35      1:2 Mix  =  N/A      Reference =  31-43             DILUTE MADELINE VIPER VENOM TEST:      DRVVT Screen Ratio = 0.76 Normal = <1.21         IMMUNOGLOBULIN G SUBCLASSES       Component Value Range    IGG 1030  695 - 1620 mg/dL    IgG1 488  300 - 856 mg/dL    IgG2 325  158 - 761 mg/dL    IgG3 47  24 - 192 mg/dL    IgG4 18  11 - 86 mg/dL   SUNNY ANTIBODY PANEL       Component Value Range    Ribonucleic Protein  IgG Antibody 0      Smith Antibody IgG 1      SSA (RO) Antibody IgG 4      SSB (LA) Antibody IgG 0      Scleroderma Antibody IgG 0     BETA 2 GLYCOPROTEIN ANTIBODIES IGG IGM       Component Value Range    Beta-2-Glycopro IgG 1      Beta-2-Glycopro IgM 5     CYCLIC CIT PEPT IGG       Component Value Range    Cyclic Cit Pept IgG/IgA    <20 UNITS    Value: <20      Interpretation:  Negative   DNA DOUBLE STRANDED ANTIBODIES       Component Value Range    DNA-ds    0 - 29 IU/mL    Value: <15      Interpretation:  Negative       Component      Latest Ref Rng 3/11/2014   Sodium      133 - 144 mmol/L 137   Potassium      3.4 - 5.3 mmol/L 4.6   Chloride      94 - 109 mmol/L 97   Carbon Dioxide      20 - 32 mmol/L 20   Anion Gap      6 - 17 mmol/L 20 (H)   Glucose      60 - 99 mg/dL 243 (H)   Urea Nitrogen      5 - 24 mg/dL 35 (H)   Creatinine      0.52 - 1.04 mg/dL 1.47 (H)   GFR Estimate      >60 mL/min/1.7m2 38 (L)   GFR Estimate If Black      >60 mL/min/1.7m2 46 (L)   Calcium      8.5 - 10.4 mg/dL 9.7   Bilirubin Total      0.2 - 1.3 mg/dL 0.3   Albumin      3.9 - 5.1 g/dL 4.8   Protein Total      6.8 - 8.8 g/dL 7.9   Alkaline Phosphatase      40 - 150 U/L 73   ALT      0 - 50 U/L 35   AST      0 - 45 U/L 30   Color Urine       Yellow   Appearance Urine       Clear   Glucose Urine      NEG mg/dL 500 (A)   Bilirubin Urine      NEG Negative   Ketones Urine      NEG mg/dL Negative   Specific Gravity Urine      1.003 - 1.035 1.020   Blood Urine      NEG Negative   pH Urine      5.0 - 7.0 pH 5.5   Protein Albumin Urine      NEG mg/dL Negative   Urobilinogen Urine      0.2 - 1.0 EU/dL 0.2   Nitrite Urine      NEG Negative   Leukocyte Esterase Urine      NEG Negative   Source       Midstream Urine   WBC      4.0 - 11.0 10e9/L 14.0 (H)   RBC Count      3.8 - 5.2 10e12/L 5.02   Hemoglobin      11.7 - 15.7 g/dL 12.4   Hematocrit      35.0 - 47.0 % 37.1   MCV      78 - 100 fl 74 (L)   MCH      26.5 - 33.0 pg 24.7 (L)   MCHC       31.5 - 36.5 g/dL 33.4   RDW      10.0 - 15.0 % 15.3 (H)   Platelet Count      150 - 450 10e9/L 420       Component      Latest Ref Rng 3/25/2014   Sodium      133 - 144 mmol/L 137   Potassium      3.4 - 5.3 mmol/L 3.7   Chloride      94 - 109 mmol/L 100   Carbon Dioxide      20 - 32 mmol/L 21   Anion Gap      6 - 17 mmol/L 16   Glucose      60 - 99 mg/dL 214 (H)   Urea Nitrogen      5 - 24 mg/dL 20   Creatinine      0.52 - 1.04 mg/dL 1.02   GFR Estimate      >60 mL/min/1.7m2 58 (L)   GFR Estimate If Black      >60 mL/min/1.7m2 70   Calcium      8.5 - 10.4 mg/dL 9.5   Phosphorus      2.5 - 4.5 mg/dL 3.7   Albumin      3.9 - 5.1 g/dL 4.6     Component      Latest Ref Rng 4/30/2014   CRP Inflammation      0.0 - 8.0 mg/L 10.0 (H)   Sed Rate      0 - 20 mm/h 39 (H)   Rheumatoid Factor      <20 IU/mL <20   Glucose-6-PO4 Dehydrogenase       17.7     Component      Latest Ref Rng 6/11/2014 7/15/2014   WBC      4.0 - 11.0 10e9/L 11.0    RBC Count      3.8 - 5.2 10e12/L 4.74    Hemoglobin      11.7 - 15.7 g/dL 11.8    Hematocrit      35.0 - 47.0 % 36.1    MCV      78 - 100 fl 76 (L)    MCH      26.5 - 33.0 pg 24.9 (L)    MCHC      31.5 - 36.5 g/dL 32.7    RDW      10.0 - 15.0 % 13.9    Platelet Count      150 - 450 10e9/L 434    Diff Method       Automated Method    % Neutrophils       59.2    % Lymphocytes       31.6    % Monocytes       5.9    % Eosinophils       2.6    % Basophils       0.5    % Immature Granulocytes       0.2    Absolute Neutrophil      1.6 - 8.3 10e9/L 6.5    Absolute Lymphocytes      0.8 - 5.3 10e9/L 3.5    Absolute Monoctyes      0.0 - 1.3 10e9/L 0.7    Absolute Eosinophils      0.0 - 0.7 10e9/L 0.3    Absolute Basophils      0.0 - 0.2 10e9/L 0.1    Abs Immature Granulocytes      0 - 0.4 10e9/L 0.0    Sodium      133 - 144 mmol/L 138 140   Potassium      3.4 - 5.3 mmol/L 3.9 3.8   Chloride      94 - 109 mmol/L 97 103   Carbon Dioxide      20 - 32 mmol/L 26 22   Anion Gap      6 - 17 mmol/L 14.9 15    Glucose      60 - 99 mg/dL 111 (H) 163 (H)   Urea Nitrogen      5 - 24 mg/dL 28 (H) 15   Creatinine      0.52 - 1.04 mg/dL 1.10 (H) 0.99   GFR Estimate      >60 mL/min/1.7m2 53 (L) 60 (L)   GFR Estimate If Black      >60 mL/min/1.7m2 64 72   Calcium      8.5 - 10.4 mg/dL 10.3 9.3   Bilirubin Total      0.2 - 1.3 mg/dL 0.6 0.2   Albumin      3.9 - 5.1 g/dL 5.1 4.2   Protein Total      6.8 - 8.8 g/dL 9.0 (H) 7.2   Alkaline Phosphatase      40 - 150 U/L 87 69   ALT      0 - 50 U/L 37 33   AST      0 - 45 U/L 40 26   Color Urine       Straw    Appearance Urine       Clear    Glucose Urine      NEG mg/dL Negative    Bilirubin Urine      NEG Negative    Ketones Urine      NEG mg/dL Negative    Specific Gravity Urine      1.003 - 1.035 1.005    Blood Urine      NEG Negative    pH Urine      5.0 - 7.0 pH 5.0    Protein Albumin Urine      NEG mg/dL Negative    Urobilinogen mg/dL      0.0 - 2.0 mg/dL Normal    Nitrite Urine      NEG Negative    Leukocyte Esterase Urine      NEG Negative    Source       Midstream Urine    Lipase      20 - 250 U/L 403 (H)    CRP Inflammation      0.0 - 8.0 mg/L <5.0    N-Terminal Pro Bnp      0 - 125 pg/mL  55   Hemoglobin A1C      4.3 - 6.0 %  7.0 (H)     Component      Latest Ref Rn 11/12/2014   Sodium      133 - 144 mmol/L 135   Potassium      3.4 - 5.3 mmol/L 3.8   Chloride      94 - 109 mmol/L 104   Carbon Dioxide      20 - 32 mmol/L 24   Anion Gap      3 - 14 mmol/L 7   Glucose      70 - 99 mg/dL 125 (H)   Urea Nitrogen      7 - 30 mg/dL 17   Creatinine      0.52 - 1.04 mg/dL 1.18 (H)   GFR Estimate      >60 mL/min/1.7m2 49 (L)   GFR Estimate If Black      >60 mL/min/1.7m2 59 (L)   Calcium      8.5 - 10.1 mg/dL 9.8   WBC      4.0 - 11.0 10e9/L 11.1 (H)   RBC Count      3.8 - 5.2 10e12/L 4.05   Hemoglobin      11.7 - 15.7 g/dL 9.8 (L)   Hematocrit      35.0 - 47.0 % 30.6 (L)   MCV      78 - 100 fl 76 (L)   MCH      26.5 - 33.0 pg 24.2 (L)   MCHC      31.5 - 36.5 g/dL 32.0   RDW       10.0 - 15.0 % 15.5 (H)   Platelet Count      150 - 450 10e9/L 484 (H)   CT CHEST PULMONARY EMBOLISM W CONTRAST 5/20/2015 4:57 PM  HISTORY: Pain. SOB. Elevated d-dimer.   TECHNIQUE: 65 mL Isovue 370. Axial images with coronal  reconstructions.  COMPARISON: None.  FINDINGS: Calcified granuloma with surrounding scarring in the  posterolateral left upper lobe. Triangular shaped opacity at the right  lung base in the lateral right middle lobe could represent some  scarring, atelectasis or infiltrate. There is also some scarring or  atelectasis in the posteromedial right lung base. Lungs otherwise  clear.  The pulmonary arteries are well opacified. No CT evidence for acute  pulmonary embolus. No aortic dissection.  Diffuse fatty infiltration of the liver.  IMPRESSION  IMPRESSION:   1. No pulmonary embolus identified.  2. Small focus of atelectasis, infiltrate or scarring in the lateral  right middle lobe.  3. Old granulomatous disease.  4. Otherwise negative.  SILVERIO VAZQUEZ MD  Component      Latest Ref Rng 3/27/2015   WBC      4.0 - 11.0 10e9/L 11.8 (H)   RBC Count      3.8 - 5.2 10e12/L 4.53   Hemoglobin      11.7 - 15.7 g/dL 11.0 (L)   Hematocrit      35.0 - 47.0 % 33.8 (L)   MCV      78 - 100 fl 75 (L)   MCH      26.5 - 33.0 pg 24.3 (L)   MCHC      31.5 - 36.5 g/dL 32.5   RDW      10.0 - 15.0 % 15.4 (H)   Platelet Count      150 - 450 10e9/L 419   Diff Method       Automated Method   % Neutrophils       75.3   % Lymphocytes       17.4   % Monocytes       4.4   % Eosinophils       2.5   % Basophils       0.2   % Immature Granulocytes       0.2   Absolute Neutrophil      1.6 - 8.3 10e9/L 8.9 (H)   Absolute Lymphocytes      0.8 - 5.3 10e9/L 2.1   Absolute Monoctyes      0.0 - 1.3 10e9/L 0.5   Absolute Eosinophils      0.0 - 0.7 10e9/L 0.3   Absolute Basophils      0.0 - 0.2 10e9/L 0.0   Abs Immature Granulocytes      0 - 0.4 10e9/L 0.0   Creatinine      0.52 - 1.04 mg/dL 1.12 (H)   GFR Estimate      >60 mL/min/1.7m2 52  (L)   GFR Estimate If Black      >60 mL/min/1.7m2 63   Iron      35 - 180 ug/dL 25 (L)   Iron Binding Cap      240 - 430 ug/dL 346   Iron Saturation Index      15 - 46 % 7 (L)   Albumin      3.4 - 5.0 g/dL 4.0   ALT      0 - 50 U/L 24   AST      0 - 45 U/L 15   CRP Inflammation      0.0 - 8.0 mg/L 28.0 (H)   Sed Rate      0 - 20 mm/h 81 (H)   Rheumatoid Factor      <20 IU/mL <20   Vitamin D Deficiency screening      30 - 75 ug/L 44   Ferritin      8 - 252 ng/mL 34     Component      Latest Ref Rng 7/29/2015   Sodium      133 - 144 mmol/L 137   Potassium      3.4 - 5.3 mmol/L 3.8   Chloride      94 - 109 mmol/L 106   Carbon Dioxide      20 - 32 mmol/L 23   Anion Gap      3 - 14 mmol/L 8   Glucose      70 - 99 mg/dL 148 (H)   Urea Nitrogen      7 - 30 mg/dL 17   Creatinine      0.52 - 1.04 mg/dL 1.02   GFR Estimate      >60 mL/min/1.7m2 58 (L)   GFR Estimate If Black      >60 mL/min/1.7m2 70   Calcium      8.5 - 10.1 mg/dL 9.0   Bilirubin Total      0.2 - 1.3 mg/dL 0.5   Albumin      3.4 - 5.0 g/dL 4.2   Protein Total      6.8 - 8.8 g/dL 7.4   Alkaline Phosphatase      40 - 150 U/L 64   ALT      0 - 50 U/L 23   AST      0 - 45 U/L 19     Results for FAVIO MARTINEZ (MRN [id number]) as of 10/30/2015 17:00   Ref. Range 8/21/2012 09:06 5/22/2013 14:22 4/14/2014 12:06 9/11/2014 12:35 12/4/2014 16:38   TSH Latest Range: 0.40-4.00 mU/L 0.83 0.43 0.27 (L) 0.23 (L) 0.22 (L)     Component      Latest Ref Rng 10/30/2015   WBC      4.0 - 11.0 10e9/L 13.4 (H)   RBC Count      3.8 - 5.2 10e12/L 4.76   Hemoglobin      11.7 - 15.7 g/dL 12.4   Hematocrit      35.0 - 47.0 % 37.9   MCV      78 - 100 fl 80   MCH      26.5 - 33.0 pg 26.1 (L)   MCHC      31.5 - 36.5 g/dL 32.7   RDW      10.0 - 15.0 % 14.5   Platelet Count      150 - 450 10e9/L 324   Diff Method       Automated Method   % Neutrophils       67.7   % Lymphocytes       22.7   % Monocytes       6.3   % Eosinophils       2.7   % Basophils       0.4   % Immature Granulocytes        0.2   Absolute Neutrophil      1.6 - 8.3 10e9/L 9.1 (H)   Absolute Lymphocytes      0.8 - 5.3 10e9/L 3.1   Absolute Monoctyes      0.0 - 1.3 10e9/L 0.9   Absolute Eosinophils      0.0 - 0.7 10e9/L 0.4   Absolute Basophils      0.0 - 0.2 10e9/L 0.1   Abs Immature Granulocytes      0 - 0.4 10e9/L 0.0   Creatinine      0.52 - 1.04 mg/dL 1.21 (H)   GFR Estimate      >60 mL/min/1.7m2 47 (L)   GFR Estimate If Black      >60 mL/min/1.7m2 57 (L)   Iron      35 - 180 ug/dL 70   Iron Binding Cap      240 - 430 ug/dL 428   Iron Saturation Index      15 - 46 % 16   Albumin      3.4 - 5.0 g/dL 4.6   ALT      0 - 50 U/L 29   AST      0 - 45 U/L 21   CRP Inflammation      0.0 - 8.0 mg/L <2.9   Sed Rate      0 - 20 mm/h 8   Vitamin D Deficiency screening      20 - 75 ug/L 46   Ferritin      8 - 252 ng/mL 18   TSH      0.40 - 4.00 mU/L 0.49   T4 Free      0.76 - 1.46 ng/dL 1.06   Free T3      2.3 - 4.2 pg/mL 2.8     Component      Latest Ref Rng 11/17/2015   Testosterone Total      8 - 60 ng/dL 19   Sex Hormone Binding Globulin      30 - 135 nmol/L 32   Free Testosterone Calculated      0.11 - 0.58 ng/dL 0.34   Vitamin A       0.61   Retinol Palmitate       <0.02 . . .   Vitamin A Interp       Normal . . .   Thyroglobulin Antibody      <40 IU/mL <20   Thyroid Peroxidase Antibody      <35 IU/mL 31   DHEA Sulfate      35 - 430 ug/dL 101   Zinc       68     Component      Latest Ref Rng 1/27/2016   Sodium      133 - 144 mmol/L 135   Potassium      3.4 - 5.3 mmol/L 4.0   Chloride      94 - 109 mmol/L 104   Carbon Dioxide      20 - 32 mmol/L 24   Anion Gap      3 - 14 mmol/L 7   Glucose      70 - 99 mg/dL 88   Urea Nitrogen      7 - 30 mg/dL 20   Creatinine      0.52 - 1.04 mg/dL 1.13 (H)   GFR Estimate      >60 mL/min/1.7m2 51 (L)   GFR Estimate If Black      >60 mL/min/1.7m2 62   Calcium      8.5 - 10.1 mg/dL 9.4   Bilirubin Total      0.2 - 1.3 mg/dL 0.7   Albumin      3.4 - 5.0 g/dL 4.3   Protein Total      6.8 - 8.8 g/dL 7.7  "  Alkaline Phosphatase      40 - 150 U/L 66   ALT      0 - 50 U/L 24   AST      0 - 45 U/L 18   Cholesterol      <200 mg/dL 112   Triglycerides      <150 mg/dL 100   HDL Cholesterol      >49 mg/dL 34 (L)   LDL Cholesterol Calculated      <100 mg/dL 58   Non HDL Cholesterol      <130 mg/dL 78   N-Terminal Pro Bnp      0 - 125 pg/mL 29   Hemoglobin A1C      4.3 - 6.0 % 7.0 (H)   Amylase      30 - 110 U/L 60   Lipase      73 - 393 U/L 394 (H)   Troponin I ES      0.000 - 0.045 ug/L <0.015 . . .     Component      Latest Ref Rng 2/24/2016   Angiotensin Converting Enzyme       <5 (L) . . .   Neutrophil Cytoplasmic IgG Antibody       <1:20 . . .   Sed Rate      0 - 20 mm/h 86 (H)     Copath Report      Patient Name: FAVIO MARTINEZ   MR#: [id number]   Specimen #: [id number]   Collected: 3/15/2016   Received: 3/15/2016   Reported: 3/17/2016 13:33   Ordering Phy(s): PARVEEN ENRIQUEZ     ORIGINAL REPORT FOLLOWS ADDENDUM  ADDENDUM     TO ORIGINAL REPORT   Status: Signed Out   Date Ordered:3/18/2016   Date Complete:3/18/2016   Date Reported:3/18/2016 12:06   Signed Out By: Marianne Godfrey MD     INTERPRETATION:   This addendum is done to incorporate the results of fungal (GMS) stains   done on the specimen.  Specimen is negative for fungal organisms.  The   original final diagnosis remains unchanged.     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  Negative for malignancy(Please   see microscopic description)     Electronically signed out by:     Marianne Godfrey MD     CLINICAL HISTORY:   right orbital mass     GROSS:   The specimen is received labeled \"right orbital biopsy\" and consists of   red-tan nodule measuring 1.5 x 0.9 x 0.6 cm with one smooth side and   opposite rough side consistent with " periosteum.  The specimen is   bisected revealing homogenous pale tan fleshy cut surface.  Touch   preparations are prepared, air dried and fixed, portion of specimen is   submitted in RPMI for possible lymphoma workup [facility],   ([facility], [place], [place] ).  The remainder is entirely submitted.   (Dictated by: Marianne Godfrey MD 3/15/2016 04:45 PM)     MICROSCOPIC:     Specimen consists of portion of lacrimal gland with acute and chronic   inflammation.  Focal area of microabscess formation associated with   small areas of necrosis are also present.  Inflammation is seen   extending to the periorbital adipose tissue forming panniculitis.   Specimen is negative for malignancy.  Samples sent for immunophenotyping   to [facility], ([facility], [place], [place] ) reveals no evidence   of monoclonality or aberrant antigen expression.  A GMS (fungal) stain   is pending and results will be reported as an addendum.     CPT Codes:   A: 83625-HH6, 94524-MDYCF, SOH, 74343-SMRKP, 36906-PPEQ     TESTING LAB LOCATION:   [address]  [address] [phone]     COLLECTION SITE:   Client: [facility]   Location: [facility] (S)            Component      Latest Ref Rng 4/29/2016   Nucleated RBCs      0 /100 0   Absolute Neutrophil      1.6 - 8.3 10e9/L 8.9 (H)   Absolute Lymphocytes      0.8 - 5.3 10e9/L 3.2   Absolute Monocytes      0.0 - 1.3 10e9/L 0.8   Absolute Eosinophils      0.0 - 0.7 10e9/L 0.2   Absolute Basophils      0.0 - 0.2 10e9/L 0.1   Abs Immature Granulocytes      0 - 0.4 10e9/L 0.1   Absolute Nucleated RBC       0.0   IGG      695 - 1620 mg/dL 836   IgG1      300 - 856 mg/dL 280 (L)   IgG2      158 - 761 mg/dL 277   IgG3      24 - 192 mg/dL 35   IgG4      11 - 86 mg/dL 16   RNP Antibody IgG      0.0 - 0.9 AI <0.2 . . .   Smith SUNNY Antibody IgG      0.0 - 0.9 AI <0.2 . . .   SSA (Ro) (SUNNY) Antibody, IgG      0.0 - 0.9 AI <0.2 . . .    SSB (La) (SUNNY) Antibody, IgG      0.0 - 0.9 AI <0.2 . . .   Scleroderma Antibody Scl-70 SUNNY IgG      0.0 - 0.9 AI <0.2 . . .   Cholesterol      <200 mg/dL 154   Triglycerides      <150 mg/dL 220 (H)   HDL Cholesterol      >49 mg/dL 42 (L)   LDL Cholesterol Calculated      <100 mg/dL 67   Non HDL Cholesterol      <130 mg/dL 111   M Tuberculosis Result      NEG Negative   M Tuberculosis Antigen Value       0.00   Albumin      3.4 - 5.0 g/dL 4.3   ALT      0 - 50 U/L 30   AST      0 - 45 U/L 10   Complement C3      76 - 169 mg/dL 157   Complement C4      15 - 50 mg/dL 32   CRP Inflammation      0.0 - 8.0 mg/L <2.9   Sed Rate      0 - 20 mm/h 2   DNA-ds      <10 IU/mL 1   Cyclic Citrullinated Peptide Antibody, IgG      <7 U/mL 1   Rheumatoid Factor      <20 IU/mL <20   Proteinase 3 Antibody IgG      0.0 - 0.9 AI <0.2 . . .   Myeloperoxidase Antibody IgG      0.0 - 0.9 AI 2.5 (H)   Vitamin D Deficiency screening      20 - 75 ug/L 24   Hemoglobin A1C      4.3 - 6.0 % 7.9 (H)   ALLI by IFA IgG       1:40 (A) . . .     Component      Latest Ref Rng & Units 4/7/2017   WBC      4.0 - 11.0 10e9/L 11.3 (H)   RBC Count      3.8 - 5.2 10e12/L 4.77   Hemoglobin      11.7 - 15.7 g/dL 12.5   Hematocrit      35.0 - 47.0 % 38.7   MCV      78 - 100 fl 81   MCH      26.5 - 33.0 pg 26.2 (L)   MCHC      31.5 - 36.5 g/dL 32.3   RDW      10.0 - 15.0 % 13.2   Platelet Count      150 - 450 10e9/L 347   Diff Method       Automated Method   % Neutrophils      % 67.1   % Lymphocytes      % 22.9   % Monocytes      % 6.2   % Eosinophils      % 3.0   % Basophils      % 0.4   % Immature Granulocytes      % 0.4   Nucleated RBCs      0 /100 0   Absolute Neutrophil      1.6 - 8.3 10e9/L 7.5   Absolute Lymphocytes      0.8 - 5.3 10e9/L 2.6   Absolute Monocytes      0.0 - 1.3 10e9/L 0.7   Absolute Eosinophils      0.0 - 0.7 10e9/L 0.3   Absolute Basophils      0.0 - 0.2 10e9/L 0.1   Abs Immature Granulocytes      0 - 0.4 10e9/L 0.1   Absolute Nucleated  RBC       0.0   IGG      695 - 1620 mg/dL 962   IgG1      300 - 856 mg/dL Test sent to [facility]. See ARMISC.   IgG2      158 - 761 mg/dL Test sent to [facility]. See ARMISC.   IgG3      24 - 192 mg/dL Test sent to [facility]. See ARMISC.   IgG4      11 - 86 mg/dL Test sent to [facility]. See ARMISC.   Result       SEE NOTE . . .   Test Name       IGG SUBCLASSES   Send Outs Misc Test Code       66695   Send Outs Misc Test Specimen       Serum   Creatinine      0.52 - 1.04 mg/dL 1.20 (H)   GFR Estimate      >60 mL/min/1.7m2 47 (L)   GFR Estimate If Black      >60 mL/min/1.7m2 57 (L)   Creatinine Urine      mg/dL    Albumin Urine mg/L      mg/L    Albumin Urine mg/g Cr      0 - 25 mg/g Cr    Myeloperoxidase Antibody IgG      0.0 - 0.9 AI 2.9 (H)   Proteinase 3 Antibody IgG      0.0 - 0.9 AI <0.2 . . .   Neutrophil Cytoplasmic IgG Antibody       1:80 (A) . . .   Angiotensin Converting Enzyme       <5 (L) . . .   Lab Scanned Result       TPMT GENOTYPE-Scanned   Vitamin C       56   ALT      0 - 50 U/L 23   Albumin      3.4 - 5.0 g/dL 4.3   AST      0 - 45 U/L 18   CRP Inflammation      0.0 - 8.0 mg/L 3.7   Sed Rate      0 - 30 mm/h 17   Vitamin D Deficiency screening      20 - 75 ug/L 40   Hemoglobin A1C      4.3 - 6.0 %      Component      Latest Ref Rng & Units 4/26/2017   WBC      4.0 - 11.0 10e9/L 11.8 (H)   RBC Count      3.8 - 5.2 10e12/L 4.23   Hemoglobin      11.7 - 15.7 g/dL 11.2 (L)   Hematocrit      35.0 - 47.0 % 35.0   MCV      78 - 100 fl 83   MCH      26.5 - 33.0 pg 26.5   MCHC      31.5 - 36.5 g/dL 32.0   RDW      10.0 - 15.0 % 13.4   Platelet Count      150 - 450 10e9/L 304   Diff Method       Automated Method   % Neutrophils      % 66.2   % Lymphocytes      % 22.7   % Monocytes      % 6.5   % Eosinophils      % 3.9   % Basophils      % 0.4   % Immature Granulocytes      % 0.3   Nucleated RBCs      0 /100 0   Absolute Neutrophil      1.6 - 8.3 10e9/L 7.8   Absolute Lymphocytes      0.8 - 5.3 10e9/L 2.7   Absolute Monocytes       0.0 - 1.3 10e9/L 0.8   Absolute Eosinophils      0.0 - 0.7 10e9/L 0.5   Absolute Basophils      0.0 - 0.2 10e9/L 0.1   Abs Immature Granulocytes      0 - 0.4 10e9/L 0.0   Absolute Nucleated RBC       0.0   IGG      695 - 1620 mg/dL    IgG1      300 - 856 mg/dL    IgG2      158 - 761 mg/dL    IgG3      24 - 192 mg/dL    IgG4      11 - 86 mg/dL    Result          Test Name          Send Outs Misc Test Code          Send Outs Misc Test Specimen          Creatinine      0.52 - 1.04 mg/dL 1.24 (H)   GFR Estimate      >60 mL/min/1.7m2 46 (L)   GFR Estimate If Black      >60 mL/min/1.7m2 55 (L)   Creatinine Urine      mg/dL 121   Albumin Urine mg/L      mg/L <5   Albumin Urine mg/g Cr      0 - 25 mg/g Cr Unable to calculate due to low value   Myeloperoxidase Antibody IgG      0.0 - 0.9 AI    Proteinase 3 Antibody IgG      0.0 - 0.9 AI    Neutrophil Cytoplasmic IgG Antibody          Angiotensin Converting Enzyme          Lab Scanned Result          Vitamin C          ALT      0 - 50 U/L 25   Albumin      3.4 - 5.0 g/dL 4.1   AST      0 - 45 U/L 15   CRP Inflammation      0.0 - 8.0 mg/L    Sed Rate      0 - 30 mm/h    Vitamin D Deficiency screening      20 - 75 ug/L    Hemoglobin A1C      4.3 - 6.0 % 7.8 (H)   EXAMINATION: CT CHEST ABDOMEN PELVIS W/O CONTRAST, 4/7/2017 2:59 PM     TECHNIQUE: Helical CT images from the thoracic inlet through the  symphysis pubis were obtained without IV contrast.     COMPARISON: CT chest on 5/20/2015. CT abdomen pelvis on 6/11/2014     HISTORY: Granuloma of right orbit. Concern for malignancy, lymphoma.     Additional history from the EMR: 50-year-old female with rheumatoid  arthritis and fibromyalgia. Presented on April 2016 with new right  orbital inflammatory mass, biopsied showed panniculitis without  infection or malignancy. Some intermittent swelling around her right  orbit.     FINDINGS:     Chest: Cardiac size is not enlarged. No pericardial effusion.  Calcified subcentimeter  mediastinal and left hilar lymph nodes. No  thoracic adenopathy. Coronary artery calcifications/stents.  Benign-appearing coarse calcifications in the thyroid, better  described on ultrasound thyroid from 9/13/2016.     Central airways are patent. No pneumothorax or pleural effusion.  Calcified pulmonary granuloma in the posterior left upper lobe,  benign. Small focus of subpleural fibrosis and cysts along the  anterior lateral right lower lobe (image 96 series 6).     Abdomen and pelvis: Cholecystectomy. The liver, adrenal glands,  kidneys, spleen, pancreas, stomach, duodenum are without focal  abnormality on these noncontrast images.     No abdominal aortic aneurysm. No suspicious adenopathy in the abdomen  or pelvis. Bladder is intact. Calcified fibroid off the uterine  fundus. IUD in the uterus.     No focal bowel wall thickening or obstruction. No free air or free  fluid. Appendix is normal. Small periumbilical hernia.     Bones and soft tissues: No suspicious osseous lesions. Unremarkable  superficial soft tissues.         IMPRESSION: No evidence of malignancy in the chest, abdomen, or  pelvis.        I have personally reviewed the examination and initial interpretation  and I agree with the findings.     CONNIE BRICE  ROS:  A comprehensive ROS was done, positives are per HPI.        HISTORY REVIEW:  Past Medical History:   Diagnosis Date     Abnormal glandular Papanicolaou smear of cervix 1992     Allergic rhinitis     Allergy, airborne subst     Arthritis      ASCVD (arteriosclerotic cardiovascular disease)      Chronic pain      Chronic pancreatitis (H)     idiopathic, Tx: PPI, antioxidants, pancreatic enzymes     Common migraine      Coronary artery disease      Costochondritis      Costochondritis      Difficulty in walking(719.7)      Ectasia, mammary duct     [facility], persistent nipple discharge     Elevated fasting glucose      Gastro-oesophageal reflux disease      Hernia       Hyperlipidemia LDL goal < 100      Hypertension goal BP (blood pressure) < 140/90     Essential hypertension     Iron deficiency anemia      Ischemic cardiomyopathy      Menorrhagia      Migraine headaches      Mild persistent asthma      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 Matias, neg w/u, Rx'd w/prednisone     Ventral hernia, unspecified, without mention of obstruction or gangrene 2012     Past Surgical History:   Procedure Laterality Date     C ECHO HEART XTHORACIC,COMPLETE, W/O DOPPLER  04    [facility] WNL, EF 60%     C/SECTION, LOW TRANSVERSE           CARDIAC SURGERY      cardiac stent- recent in 16; 4 other stents     DILATION AND CURETTAGE N/A 2016    Procedure: DILATION AND CURETTAGE;  Surgeon: Nahed Butler MD;  Location: [facility] OR     HC UGI ENDOSCOPY W EUS  08    Dr. Pastrana, possible chronic pancreatitis, fatty liver     HERNIA REPAIR  2012    done at [facility]     INSERT INTRAUTERINE DEVICE N/A 2016    Procedure: INSERT INTRAUTERINE DEVICE;  Surgeon: Nahed Butler MD;  Location: [facility] OR     INT UTERINE FIBRIOD EMBOLIZATION  10/29/2014     LAPAROSCOPIC CHOLECYSTECTOMY  08    Dr. Arnol GRUBBS TX, CERVICAL      s/p LEEP     ORBITOTOMY Right 3/15/2016    Procedure: ORBITOTOMY;  Surgeon: Myron Cyr  MD Selvin;  Location: [facility]     UPPER GI ENDOSCOPY  10/21/08    mild gastritis, Dr. Hidalgo     Family History   Problem Relation Age of Onset     HEART DISEASE Brother 15     MI at 15, 16.      HEART DISEASE Father 50     heart attack     CEREBROVASCULAR DISEASE Father      DIABETES Father      Hypertension Father      Depression Father      C.A.D. Father      DIABETES Maternal Uncle      Hypertension Mother      Arthritis Mother      HEART DISEASE Mother      long qt syndrome     Thyroid Disease Mother      C.A.D. Mother      Hypertension Maternal Aunt      Hypertension Maternal Uncle      Arthritis Brother      he passed away, had severe arthritis at age 11     Arthritis Maternal Uncle      Eye Disorder Maternal Uncle      cataracts     Neurologic Disorder Sister      migraines     Neurologic Disorder Sister      migraines     Respiratory Son      asthma     CEREBROVASCULAR DISEASE Maternal Uncle      C.A.D. Brother      Family History Negative       neg for RA, SLE     Unknown/Adopted No family hx of      unknown neurological issues in her family, mother was adopted     Skin Cancer No family hx of      No known family hx of skin cancer     Social History     Social History     Marital status: Single     Spouse name: N/A     Number of children: 1     Years of education: N/A     Occupational History      None      Social History Main Topics     Smoking status: Former Smoker     Packs/day: 0.20     Years: 1.00     Types: Cigarettes     Quit date: 2016     Smokeless tobacco: Never Used     Alcohol use No     Drug use: No     Sexual activity: Not Currently     Other Topics Concern     Parent/Sibling W/ Cabg, Mi Or Angioplasty Before 65f 55m? Yes     Social History Narrative    Balanced Diet - sometimes    Osteoporosis Prevention Measures - Dairy servings per day: 2 servings weekly    Regular Exercise -  Yes Describe walking 4 times a week    Dental Exam - NO    Seatbelts used - Yes    Self Breast Exam -  Yes    Abuse: Current or Past (Physical, Sexual or Emotional)- No    Do you have any concerns about STD's -  No    Do you feel safe in your environment - No    Guns stored in the home - No    Sunscreen used - Yes    Lipids -  YES - Date:     Glucose -  YES - Date:     Eye Exam - YES - Date: one year ago    Colon Cancer Screening - No    Hemoccults - NO    Pap Test -  YES - Date: , remote history of LEEP    Mammography - YES - Date: last spring, would like to discuss, needs a [facility]    DEXA - NO    Immunizations reviewed and up to date - Yes, last td given in  or      Patient Active Problem List   Diagnosis     Seasonal affective disorder (H)     Allergic rhinitis     PCOS (polycystic ovarian syndrome)     Moderate persistent asthma     Chronic pancreatitis (H)     Hypertension goal BP (blood pressure) < 140/90     Common migraine     NSTEMI (non-ST elevated myocardial infarction) (H)     Hyperlipidemia LDL goal <70     ASCVD (arteriosclerotic cardiovascular disease)     GERD (gastroesophageal reflux disease)     Ischemic cardiomyopathy     Hypertensive heart disease     Uterine leiomyoma     Iron deficiency anemia     Costochondritis     Vitamin D deficiency     Breast cancer screening     Spinal stenosis in cervical region     Fibromyalgia     Seronegative rheumatoid arthritis (H)     Type 2 diabetes, HbA1C goal < 8% (H)     Type 2 diabetes mellitus with other specified complication (H)     Hyperlipidemia associated with type 2 diabetes mellitus (H)     Hypertension associated with diabetes (H)     Overweight with body mass index (BMI) 25.0-29.9     Tobacco use disorder     Telogen effluvium     CAD S/P percutaneous coronary angioplasty     Status post coronary angiogram       Pregnancy Hx: She is . All misscarriages were in first trimester. H/o OC use in the past. 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    PMHx, FHx, SHx were  reviewed, unchanged.      Outpatient Encounter Prescriptions as of 5/5/2017   Medication Sig Dispense Refill     methotrexate 2.5 MG tablet CHEMO Take 4 tablets (10 mg) by mouth once a week Take 4 tablets (10mg) by mouth weekly. 16 tablet 2     lidocaine (XYLOCAINE) 5 % ointment Apply 1 g topically 2 times daily as needed for moderate pain 50 g 5     Ketoprofen POWD Apply 1 g topically 2 times daily as needed 100 g 5     hydrALAZINE (APRESOLINE) 25 MG tablet Take 0.5 tablets (12.5 mg) by mouth daily . May take additional one-half tablet once daily if systolic blood pressure >140 mmHg. 90 tablet 3     traMADol (ULTRAM) 50 MG tablet Take 1 tablet (50 mg) by mouth every 8 hours as needed for moderate pain 60 tablet 3     folic acid (FOLVITE) 1 MG tablet 1 tab daily 90 tablet 2     fluconazole (DIFLUCAN) 100 MG tablet Take 100 mg by mouth daily as needed       hydroxychloroquine (PLAQUENIL) 200 MG tablet Take 2 tablets (400 mg) by mouth daily EYE EXAM DUE/LETTER SENT 180 tablet 0     insulin glargine (BASAGLAR KWIKPEN) 100 UNIT/ML injection Inject 40 Units Subcutaneous daily 9 mL 3     ranitidine (ZANTAC) 150 MG tablet Take 1 tablet (150 mg) by mouth 2 times daily 60 tablet 2     ferrous gluconate (FERGON) 324 (38 FE) MG tablet Take 1 tablet (324 mg) by mouth 2 times daily 180 tablet 1     ASPIRIN LOW DOSE 81 MG EC tablet TAKE 1 TABLET BY MOUTH EVERY DAY 90 tablet 3     blood glucose monitoring (ONE TOUCH DELICA) lancets Use to test blood sugars 4 times daily or as directed. 4 Box 3     vitamin D (ERGOCALCIFEROL) 89276 UNIT capsule Take 1 capsule (50,000 Units) by mouth every 7 days Need a Vitamin D level in 2 months. (Patient taking differently: Take 50,000 Units by mouth every 7 days Need a Vitamin D level in 2 months.) 8 capsule 0     metoprolol (TOPROL-XL) 100 MG 24 hr tablet Take 1 tablet (100 mg) by mouth daily 90 tablet 3     clopidogrel (PLAVIX) 75 MG tablet Take 1 tablet (75 mg) by mouth daily 90 tablet 3      lisinopril-hydrochlorothiazide (PRINZIDE/ZESTORETIC) 20-25 MG per tablet TAKE 1 TABLET BY MOUTH DAILY 90 tablet 3     blood glucose monitoring (NO BRAND SPECIFIED) meter device kit Use to test blood sugar 4 X times daily or as directed. 1 kit 0     blood glucose monitoring (NO BRAND SPECIFIED) test strip 1 strip by In Vitro route 4 times daily Test as directed. Please dispense three months and three months of refills. Thank you. 360 each 3     diphenhydrAMINE (BENADRYL) 25 MG capsule TK 1 TO 2 CS PO QHS  4     EPIPEN 2-RIKY 0.3 MG/0.3ML injection INJECT 0.3 MG INTO THE MUSCLE PRF ANAPHYALAXIS  0     AEROSPAN 80 MCG/ACT AERS INHALE 2 PUFFS PO BID.  RINSE MOUTH AFTERWARDS  4     fluticasone (FLONASE) 50 MCG/ACT spray U 2 SPRAYS IEN D.  3     Magnesium Oxide -Mg Supplement 250 MG TABS TK 1 T PO BID. REDUCE IF STOOLS LOOSEN  11     nystatin (MYCOSTATIN) 526894 UNITS TABS tablet TK 2 TS PO BID  0     albuterol (2.5 MG/3ML) 0.083% neb solution INL 1 VIAL VIA NEBULIZATION Q 4 TO 6 HOURS PRN  1     dicyclomine (BENTYL) 20 MG tablet Take 1 tablet (20 mg) by mouth 4 times daily as needed 60 tablet 5     sucralfate (CARAFATE) 1 GM tablet Take 1 tablet (1 g) by mouth 4 times daily as needed 120 tablet 3     azelastine (ASTELIN) 0.1 % spray Spray 2 sprays into both nostrils 2 times daily 1 Bottle 3     fluocinolone (SYNALAR) 0.01 % solution Apply topically daily as needed       mometasone (NASONEX) 50 MCG/ACT spray Spray 2 sprays into both nostrils daily       cyclobenzaprine (FLEXERIL) 10 MG tablet Take 1 tablet (10 mg) by mouth 2 times daily as needed for muscle spasms 180 tablet 0     Ondansetron HCl (ZOFRAN PO)        pravastatin (PRAVACHOL) 40 MG tablet Take 1 tablet (40 mg) by mouth daily 30 tablet 11     spironolactone (ALDACTONE) 25 MG tablet Take 2 tablets (50 mg) by mouth daily 60 tablet 11     metFORMIN (GLUCOPHAGE-XR) 500 MG 24 hr tablet Take 2 tablets (1,000 mg) by mouth daily (with dinner) 60 tablet 11      montelukast (SINGULAIR) 10 MG tablet Take 1 tablet (10 mg) by mouth At Bedtime 30 tablet 1     ESOMEPRAZOLE MAGNESIUM PO Take 20 mg by mouth 2 times daily (before meals)       insulin pen needle 32G X 4 MM Use 1 daily as directed. 300 each 3     acetaminophen (TYLENOL) 325 MG tablet Take 1-2 tablets (325-650 mg) by mouth every 6 hours as needed for pain (headache) 250 tablet 0     metroNIDAZOLE (NORITATE) 1 % cream Apply topically daily       desonide (DESOWEN) 0.05 % cream Apply topically 2 times daily       ketoconazole (NIZORAL) 2 % shampoo Apply topically daily as needed       fluocinonide (LIDEX) 0.05 % external solution Apply topically 2 times daily To areas of itch on the scalp as needed. 60 mL 11     sucralfate (CARAFATE) 1 GM tablet Take 1 tablet (1 g) by mouth 4 times daily as needed 40 tablet 1     nitroglycerin (NITROSTAT) 0.4 MG SL tablet Place 1 tablet (0.4 mg) under the tongue every 5 minutes as needed for chest pain 25 tablet 1     albuterol (PROAIR HFA, PROVENTIL HFA, VENTOLIN HFA) 108 (90 BASE) MCG/ACT inhaler Inhale 2 puffs into the lungs every 6 hours as needed for shortness of breath / dyspnea or wheezing 3 Inhaler 1     calcium carbonate (TUMS) 500 MG chewable tablet Take 3-4 chew tab by mouth daily as needed.       fexofenadine (ALLEGRA) 180 MG tablet Take 1 tablet by mouth daily as needed. 90 tablet 3     olopatadine (PATANOL) 0.1 % ophthalmic solution Place 1 drop into both eyes 2 times daily as needed for allergies. 5 mL 12     No facility-administered encounter medications on file as of 5/5/2017.        Allergies   Allergen Reactions     Amoxicillin-Pot Clavulanate      Augmentin      Unknown possible hives and edema     Azithromycin      Diatrizoate Other (See Comments)     Pt wants this listed because she is allergic to shellfish      Imitrex [Sumatriptan]      Severe face/neck/chest tightness and flushing side effects      Penicillins Hives     Unknown      Pork Allergy      Stomach  "pain, cramping, diarrhea, itching, nausea and headaches     Shellfish Allergy Hives and Swelling     Sulfa Drugs Hives and Swelling     Zithromax [Azithromycin Dihydrate] Swelling     Unknown          Ph.E:    Vitals:    05/05/17 1458   BP: 109/72   Pulse: 79   Temp: 98.3  F (36.8  C)   TempSrc: Oral   SpO2: 99%   Weight: 76.4 kg (168 lb 6.4 oz)   Height: 1.6 m (5' 3\")           Constitutional: WD/WN. Pleasant. In no acute distress. Obese.  Eyes: Swelling around R eye worsened since last visit, EOMI  HEENT: No oral ulcers or thrush. Normal salivary pool.  Neck: No cervical LAP, + thyromegaly  Chest: Clear to auscultation bilaterally  CV: RRR, no murmurs/ rubs or gallops. No edema, clubbing or cyanosis.   GI: Abdomen is soft and non tender.  MS: No synovitis or joint tenderness. Cool joints. No joint deformities. Full ROM of the joints. No nodules. +Fibromyalgia trigger points.  Skin: No livedo, periungual erythema, digital ulcers or nail changes. + alopecia with scales, facial malar erythema (looks like seborrhoic dermatitis).  Neuro: A&O x 3. Grossly non focal, muscular power 5/5 in all ext  Psych: NL affect and mood    Assessment/ plan:    1-Seropositive non-erosive RA (arthritis, AM stiffness, high CRP, RF 26 but re-check neg 3/2015 on HCQ) Dx 5/2013, FMS, new pleuritic CP 3/20-15-5/2015 resolved on steroids. She is on  mg bid since 5/2014. She tolerates it well and it's helping some but not enough to control all her pain. Continues having flare ups of joint pain, swelling also has FMS. Joint sx are getting worse. Had high ESR/CRP in 3/2015 and new pleuritic CP between 3/2015-5/2015 which resolved after taking medrol dose pack prescribed 5/20/2015. Her pleuritic CP could be related to her RA. Then stopped tramadol as it blames it for recent episodes of CP.    In the past, MTX was recommended, she declined.    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 Etiology of mass unclear, but it's inflammatory and related to pt's underlying autoimmune disease (inflammatory arthritis, serositis). 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    Her work up showed borderline + ALLI and slightly elevated MPO. I told her prednisone is not good for her diabetes and weight gain. Recommended a trial of MTX as next step. Discussed risks on details. I called her neuro-ophthalmologist at last visit who agreed with trial of MTX (she suspects pseudo-sarcoidosis or sarcoid like disease but no granuloma and ACE not elevated).     Unfortunately despite all my efforts to explain risks vs benefits (more than risks) of MTX as steroid sparing gent, Janine declined to try MTX. I was very concerned about her R orbital inflammatory mass as there is high chance of flare up off steroids and steroid causes her weigh gain and uncontrolled diabetes. I even offered her other steroid sparing gents like imuran. She declined that as well.    Her inflammation around R orbit has recurred now. On 4/7/2017, I spent quite amount of time discussing a trial of MTX. After discussing risks/benefits, she agreed to try it.     She is on MTX 10 mg po qwk since 4/7/2017. No benefits and more GI SEs, more hair loss and headaches since start of MTX. No benefits yet. I called and spoke with her neuro-ophthalmologist who recommended a second opinion from neuro-ophthalmology at the . I suspect her presentation to be ANCA vasculitis related but would like to repeat imaging and get neuro-ophthalmology eval here as might need repeat biopsy to r/o lymphoma.    PLAN:    Prednisone taper: 40-30-20-10 mg a day each for 3 days then stop (she declined higher dose and longer taper despite my concern for worsening swelling around orbit)    Stop methotrexate given  side effects    Start imuran 50 mg a day, NL TPMT and risks were discussed.    Neuro-ophthalmology referral    MRI brain and orbit with contrast    Labs in 4 weeks for AZA monitoring    Return 6/15 10:30 am      CT chest/abdomen/pelvis 4/2017 was neg for malignancy. 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    Continue accupuncture.     My impression is that her arthralgias are a combination of IA, fibromyalgia and chronic pain.  She does not think HCQ is beneficial and wants to stop, OK to stop but resume if arthralgia gets worse then would need annual eye exam    2-Fad pads. She was seen by endocrinology and cushing was ruled out in 4/2014. Was advised to do f/u for enlarged thyroid/thyroid nodules.    3-Hair loss. F/U with dermatology    4-Chronic pain. F/U with pain clinic    5-Concerns of subclinical hyperthyroidism: TSH is barely minimal, chart review shows that those lowest levels are since April 2014. At last visit, discussed Endocrinology ref which pt wants to hold off in this visit.     6-Vit D deficiency. Was replaced with vit D 50, 000 units po qwk x 12 wk then 2000 units qd    PLAN: Follow up in 6/15/2017    MEDICATION CHANGES: as above      Orders Placed This Encounter   Procedures     MR Brain w/o & w Contrast     MR Brain and Orbits     ALT     AST     Albumin level     CBC with platelets differential     Creatinine     CRP inflammation     Erythrocyte sedimentation rate auto     Routine UA with Micro Reflex to Culture     Protein  random urine     Creatinine random urine     Antineutrophil cytoplasmic Marline IgG     Vasculitis panel     OPHTHALMOLOGY ADULT REFERRAL               HPI      ROS      Physical Exam              HPI      ROS      Physical Exam

## 2017-05-05 NOTE — LETTER
5/5/2017    RE: Janine Cornell  [address] [facility] [id number]       Rheumatology F/U Note    Last visit note: 4/7/2017        Today's visit date: 5/5/2017    Reason for visit: RA, Fibromyalgia     HPI from last visit    Ms. Cornell is a 51 yo AAF who was referred to our clinic for evaluation and management of her joint pain in setting of positive RF 26.    Her joint symptoms first started more than a year ago, but over last 6-8 months fluctuating some good and bad days . All her joints are involved. Over last 5 months, hands became swollen, warm and painful. Tylenol does not help. Ketoprofen did not help. Was told to avoid NSAIDs given ACS. Reports AM/PM stiffness x 2-3 hr, can't make full fist when wakes up in AM.    She feels tired all the time. Reports worsening hair loss and unchanged  facial rash. Gets red sore flaky rash over cheeks across her face which is intermittent diagnosed Rosacea and is prescribed metronidazole gel which she has not started using. Reports occasional oral ulcers. Hands feel cold with red discoloration last winter. Has occasional dysphagia to both solids and liquid. Has dry eyes, is using OTC allergy eyedrops. Has chronic abdominal pain. Has h/o pancreatitis. Sometime has anxiety. Occasionally has numbness, tingling in fingers/toes. Has back and spine issues, her whole back and neck hurts, different areas at different time. She is wondering if she has FMS. Has hard time sleeping, has never been diagnosed with BRENNA. She does not know if he snores. She was found to have slightly positive RF in 2/2013. Has microcytic anemia.     Her arthralgia gets worse with drop in temperature. Reports body ache. Activity makes her pain worse. Her joint swelling isintermittent. Her body pain and joint pain is the same. Reports AM stiffness x 3 hr.    She has been doing acupuncture x few months and it is helping with her pain. She thinks that the combination of plaquenil and acupuncture is  helpful but overall she notice 30% in her symptoms relief.     Takes tylenol and tramadol on occasion for pain.    She decided to use different formulation of metformin which helped with her abdominal pain. I referred her to GI for evaluation of elevated lipase and abdominal pain. She decided to see GI in the future.       She is on  mg qd since 5/2014, tolerates it well and it helps her some. Denies taking any gabapentin due to chest pain and headches. She has had referral her for water aerobics, but she can't begin until she's healed from recent surgery in 10/2014. She thinks HCQ is helping but not enough to control all her pain, reports 2-3 hr of AM stiffness with ongoing diffuse arthralgia/myalgia along with intermittent swelling of hands. She does see her acupuncture person and it helps with her pain, has not started water aerobics yet. Has got her flu shot.       10/2015: Reports having pleuritic CP in 3/2015, was prescribed Lidoderm patches first. It did not help. Took prednisone (?dose) by her own, pain got better but it recurred off prednisone and gradually got worse to the point that she could not stand the pain anymore, was seen in ER in 5/2015, was treated with medrol dose pack which helped. Reports pain over hips, knees, ankles and fingers. Pain gets worse with walking. Reports myalgia, swelling of the arms. Pain over neck, shoulders. Has AM stiffness x 3-4 hr. Fingers swell up and become painful. Can't remember if steroid helped with joint pain. She had headaches, severe CP when she took tramadol and gabapentin and stopped taking them, sx resolved but she can't tell which one caused SEs but thinks that probably it was gabapentin. She has good days and tries to be more active but activity aggravates the pain. Headaches are intermittent. HCQ helps some not enough to control all the pain. No HCQ SEs. Had eye exam around July 2015. Flexeril helps but PCP wants to change flexeril to zanaflex,  reporting that she is NOT comfortable with the change. Acupuncture still helps an she continued to  do that. Concerned about fat pads she has in supraclavicular area, has h/o thyroid nodules. Continues having hair loss, takes iron for iron deficiency.     2/2016: She has 2 major complaints today, increased joint pain/swelling in hands/feet and recent Dx of optic neuritis in R eye, reports having similar problem about 20 yr ago, now recurred. Has a spot in R eye vision x long term, was seen by ophthalmology few days ago and was told to have optic neuritis. Gets joint pain, muscle pain. Can't  objects, hands are swollen. Knees, hips and ankles are painful. Reports more arthralgia. AM stiffness/ pain is 3-4 hr. She wants me to talk to her ophthalmologist directly.      She had botox inj for migraines which did not help her. She is going to have angiogram next wk, had to take more nitro for CP recently.       4/29/2016: She reports being on steroid taper x 3 since last visit. Reportedly, had orbit MRI, it showed swelling/inflamamtion of R lacrimal glands. She had painful swelling of her R eye, cause her headaches. Botox inj made her headaches worse. She is being dealing with this since 1/2016, with severe headaches and pain/swelling around R eye. Had biopsy in 3/2016. She is frustrated as prednisone causes her weight gain and her BG is difficult to control on it.    5/2016: At last visit, was prescribed MTX 10 mg po qwk to take along with FA 1 mg qd. She decided not to take MTX, is afraid of side effects, believes all these medications would harm her. She also believes that botox caused her inflammation around R eye. No major flare up of per-orbital inflamamtion since last visit but continues to have swelling around her R eye.      11/2016: Reports diffuse body ache, arthralgia, myalgia especially with weather change. No major flare up of campos-orbital inflammation but her face/around R eye swells up from time to  time. Reports 2 episodes of CP over past 2 mo, nitro sometimes helps and sometimes does not help. She has asthma and costochondritis and she has hard time to distinguish the origin of pain. Sometimes knees and fingers swell up. Her stiffness is sometimes all day.    4/2017:  Reports having sinusitis since last visit. Her R eye is dry and is using eyedrops to keep it moist. Reports having pain in ears. Was treated with antibiotics by PCP, it got better then it got worse. Reports catching infections easily. Then started having pressure over eyes with pain/headaches (exact start date is unknown). Pain gradually got worse and became persistent. Had sinus CT.     4/7/2017: Having a flare up of swelling, pain around her R orbit. Has not tried MTX yet.      Today: On MTX 10 mg po qwk since 4/7/2017, reports increased stomach pain and nausea and new headaches since start of MTX and it's not helping. Pain/swelling around R orbit is worse.    Her records were reviewed.        Component      Latest Ref Rng 2/28/2013 2/28/2013          12:14 PM 12:14 PM   WBC      4.0 - 11.0 10e9/L     RBC Count      3.8 - 5.2 10e12/L     Hemoglobin      11.7 - 15.7 g/dL     Hematocrit      35.0 - 47.0 %     MCV      78 - 100 fl     MCH      26.5 - 33.0 pg     MCHC      31.5 - 36.5 g/dL     RDW      10.0 - 15.0 %     Platelet Count      150 - 450 10e9/L     Specimen Description           Lyme Screen IgG and IgM           Vitamin D Deficiency screening      30 - 75 ug/L     Ferritin      10 - 300 ng/mL     Sed Rate      0 - 20 mm/h     ALLI Screen by EIA      <1.0     Rheumatoid Factor      0 - 14 IU/mL     CK Total      30 - 225 U/L  78   Uric Acid      2.5 - 7.5 mg/dL 6.7      Component      Latest Ref Rng 2/28/2013          12:14 PM   WBC      4.0 - 11.0 10e9/L    RBC Count      3.8 - 5.2 10e12/L    Hemoglobin      11.7 - 15.7 g/dL    Hematocrit      35.0 - 47.0 %    MCV      78 - 100 fl    MCH      26.5 - 33.0 pg    MCHC      31.5 - 36.5 g/dL     RDW      10.0 - 15.0 %    Platelet Count      150 - 450 10e9/L    Specimen Description       Serum   Lyme Screen IgG and IgM       Test value: <0.75....Interpretation: Negative....If you highly suspect Lyme . . .   Vitamin D Deficiency screening      30 - 75 ug/L    Ferritin      10 - 300 ng/mL    Sed Rate      0 - 20 mm/h    ALLI Screen by EIA      <1.0    Rheumatoid Factor      0 - 14 IU/mL    CK Total      30 - 225 U/L    Uric Acid      2.5 - 7.5 mg/dL      Component      Latest Ref Rng 2/28/2013 2/28/2013 2/28/2013 2/28/2013          12:14 PM 12:14 PM 12:14 PM 12:14 PM   WBC      4.0 - 11.0 10e9/L       RBC Count      3.8 - 5.2 10e12/L       Hemoglobin      11.7 - 15.7 g/dL       Hematocrit      35.0 - 47.0 %       MCV      78 - 100 fl       MCH      26.5 - 33.0 pg       MCHC      31.5 - 36.5 g/dL       RDW      10.0 - 15.0 %       Platelet Count      150 - 450 10e9/L       Specimen Description             Lyme Screen IgG and IgM             Vitamin D Deficiency screening      30 - 75 ug/L       Ferritin      10 - 300 ng/mL    10   Sed Rate      0 - 20 mm/h   23 (H)    ALLI Screen by EIA      <1.0  <1.0 . . .     Rheumatoid Factor      0 - 14 IU/mL 26 (H)      CK Total      30 - 225 U/L       Uric Acid      2.5 - 7.5 mg/dL         Component      Latest Ref Rng 2/28/2013 2/28/2013          12:14 PM 12:14 PM   WBC      4.0 - 11.0 10e9/L 14.1 (H)    RBC Count      3.8 - 5.2 10e12/L 4.55    Hemoglobin      11.7 - 15.7 g/dL 10.7 (L)    Hematocrit      35.0 - 47.0 % 33.3 (L)    MCV      78 - 100 fl 73 (L)    MCH      26.5 - 33.0 pg 23.5 (L)    MCHC      31.5 - 36.5 g/dL 32.1    RDW      10.0 - 15.0 % 18.1 (H)    Platelet Count      150 - 450 10e9/L 407    Specimen Description           Lyme Screen IgG and IgM           Vitamin D Deficiency screening      30 - 75 ug/L  32   Ferritin      10 - 300 ng/mL     Sed Rate      0 - 20 mm/h     ALLI Screen by EIA      <1.0     Rheumatoid Factor      0 - 14 IU/mL     CK Total       30 - 225 U/L     Uric Acid      2.5 - 7.5 mg/dL          MRI CERVICAL SPINE WITHOUT CONTRAST 4/3/2013 12:47 PM    HISTORY: Cervicalgia. Degeneration of cervical intervertebral disc.  MRI cervical spine. Evaluate bilateral supraclavicular lymph nodes.  Clinical enlargement.    TECHNIQUE: Multiplanar multisequence MRI of the cervical spine  without gadolinium contrast.    COMPARISON: None.    FINDINGS: The patient has a developmentally small central canal.  Images of the cervical cord reveals small areas of T2 hyperintensity  within the cervical cord. There is a small area of high signal  intensity at the C1 level. There is also a small area of high signal  intensity at the C6-C7 level. The area of high signal at C6-C7 is  located at an area of central spinal stenosis. This may be due to  myelomalacia. The area of high signal at C1-C2 is indeterminate.    C2-C3: Normal disc, facet joints, spinal canal and neural foramina.    C3-C4: Normal disc, facet joints, spinal canal and neural foramina.    C4-C5: Normal disc, facet joints, spinal canal and neural foramina.    C5-C6: There is degeneration of the disc. There is a mild annular  disc bulge. The central canal is mild-moderately narrowed. The  residual AP diameter of the central spinal canal measures  approximately 9 mm.    C6-C7: There is degeneration of the disc. There is loss of disc space  height. There is a diffuse annular disc bulge. There are associated  posterior osteophytes. There are severe central spinal stenosis at  this level. The residual AP diameter of the central spinal canal is  only about 6 mm. There is significant flattening of the cord. There  is high signal in the cord at this level consistent with  myelomalacia. There is moderate to severe right foraminal stenosis  due to and uncinate spur.    C7-T1: Normal disc, facet joints, spinal canal and neural foramina.            Result Impression       IMPRESSION:  1. Severe central spinal stenosis at  C6-C7 due to a developmentally  small canal and due to a bulging disc and associated posterior  osteophyte. There is flattening of the cord at this level and high  signal in the cord at this level consistent with myelomalacia.  2. There is a small, indeterminate 2-3 mm area of high signal  intensity in the cord at the C1 level. This could be due to gliosis  secondary to a previous infectious or inflammatory process.  Demyelinating disease could also have a similar appearance. Clinical  correlation suggested.    GAIL MORE MD     MRI LEFT UPPER EXTREMITY NON-JOINT WITHOUT CONTRAST, MRI RIGHT UPPER  EXTREMITY NON-JOINT WITHOUT CONTRAST April 3, 2013 1:32 PM    HISTORY: Cervicalgia. Degeneration of cervical intervertebral disc.    TECHNIQUE: Multiplanar, multisequence imaging of the brachial plexus  was performed without gadolinium contrast enhancement.    COMPARISON: None.    FINDINGS: No mass lesions are seen. No inflammatory process is  identified. The roots, trunks, branches, and divisions of both the  right and left brachial plexus appear normal. No adenopathy is seen.  The anterior scalene muscles and the middle scalene muscles appear  normal. Nodules are seen within the thyroid gland. The largest nodule  is seen in the left lobe of the thyroid. This measures about 1.8 cm  in diameter.            Result Impression       IMPRESSION:  1. Both the right and left brachial plexus regions appear normal.  2. Thyroid nodules. The largest nodule is in the left lobe of the  thyroid. It measures about 1.8 cm in diameter.       XR WRIST BILATERAL G/E 3 VIEWS    Narrative:        EXAMINATION:  1. Each hand 3 views  2. Each wrist 3 views     DATE: 5/1/2013     HISTORY: Arthropathy with concern for rheumatoid.     FINDINGS: 3 views of each hand and 3 views of each wrist are obtained  without prior for comparison. Alignment is normal. There is no  fracture or acute bone abnormality. No distinct erosions are seen.  Some spurring  of the distal radial ulnar joint is noted on the right.       Impression:     IMPRESSION:  1. No evidence of an inflammatory arthropathy involving either hand  or wrist.     VIELKA GUEVARA MD         XR FOOT BILATERAL G/E 3 VIEWS    Narrative:        EXAMINATION:  1. Each foot 3 views  2. Each ankle 3 views  3. Sacroiliac joint series     DATE: 5/1/2013     HISTORY: Arthropathy; concern for rheumatoid.     FINDINGS: No prior for comparison.     A frontal and bilateral oblique evaluation of the sacroiliac joints  shows no malalignment. Some patchy sclerosis is identified along the  central aspect of both sacroiliac joints, which is favored to be  degenerative. No distinct erosions are seen. The visualized portion  of the hip joint spaces appear maintained, with no erosive changes  identified. Mild endplate spurring is noted in the lower lumbar  spine.     3 views of each foot and 3 views of each ankle show no evidence of  acute fracture or dislocation. The metatarsal phalangeal,  tarsometatarsal and intertarsal joint spaces appear maintained. No  erosions are identified. There is no sign of acroosteolysis. The  ankle mortise and talar dome are intact bilaterally. Minimal spurring  is noted along the tip of the medial malleolus bilaterally.       Impression:     IMPRESSION:  1. Patchy sclerosis identified along the central aspect of both  sacroiliac joints, which is favored to be degenerative. No distinct  erosions are seen.  2. No evidence of an inflammatory arthropathy in either foot or ankle.     VIELKA GUEVARA MD     5/2013  CBC WITH PLATELETS DIFFERENTIAL       Component Value Range    WBC 11.3 (*) 4.0 - 11.0 10e9/L    RBC Count 4.56  3.8 - 5.2 10e12/L    Hemoglobin 11.1 (*) 11.7 - 15.7 g/dL    Hematocrit 34.3 (*) 35.0 - 47.0 %    MCV 75 (*) 78 - 100 fl    MCH 24.3 (*) 26.5 - 33.0 pg    MCHC 32.4  31.5 - 36.5 g/dL    RDW 16.1 (*) 10.0 - 15.0 %    Platelet Count 315  150 - 450 10e9/L    Diff Method Automated Method      %  Neutrophils 71.6  40 - 75 %    % Lymphocytes 20.9  20 - 48 %    % Monocytes 4.3  0 - 12 %    % Eosinophils 2.8  0 - 6 %    % Basophils 0.2  0 - 2 %    % Immature Granulocytes 0.2  0 - 0.4 %    Absolute Neutrophil 8.1  1.6 - 8.3 10e9/L    Absolute Lymphocytes 2.4  0.8 - 5.3 10e9/L    Absolute Monoctyes 0.5  0.0 - 1.3 10e9/L    Absolute Eosinophils 0.3  0.0 - 0.7 10e9/L    Absolute Basophils 0.0  0.0 - 0.2 10e9/L    Abs Immature Granulocytes 0.0  0 - 0.03 10e9/L   AMYLASE       Component Value Range    Amylase 103  30 - 110 U/L   COMPREHENSIVE METABOLIC PANEL       Component Value Range    Sodium 144  133 - 144 mmol/L    Potassium 3.8  3.4 - 5.3 mmol/L    Chloride 105  94 - 109 mmol/L    Carbon Dioxide 23  20 - 32 mmol/L    Anion Gap 17  6 - 17 mmol/L    Glucose 173 (*) 60 - 99 mg/dL    Urea Nitrogen 13  5 - 24 mg/dL    Creatinine 0.83  0.52 - 1.04 mg/dL    GFR Estimate 74  >60 mL/min/1.7m2    GFR Estimate If Black 90  >60 mL/min/1.7m2    Calcium 9.7  8.5 - 10.4 mg/dL    Bilirubin Total 0.4  0.2 - 1.3 mg/dL    Albumin 4.3  3.9 - 5.1 g/dL    Protein Total 7.8  6.8 - 8.8 g/dL    Alkaline Phosphatase 66  40 - 150 U/L    ALT 36  0 - 50 U/L    AST 28  0 - 45 U/L   CK TOTAL       Component Value Range    CK Total 66  30 - 225 U/L   CRP INFLAMMATION       Component Value Range    CRP Inflammation 10.4 (*) 0.0 - 8.0 mg/L   LIPASE       Component Value Range    Lipase 353 (*) 20 - 250 U/L   ERYTHROCYTE SEDIMENTATION RATE AUTO       Component Value Range    Sed Rate 26 (*) 0 - 20 mm/h   ROUTINE UA WITH MICROSCOPIC REFLEX TO CULTURE       Component Value Range    Color Urine Yellow      Appearance Urine Slightly Cloudy      Glucose Urine 30 (*) NEG mg/dL    Bilirubin Urine Negative  NEG    Ketones Urine 5 (*) NEG mg/dL    Specific Gravity Urine 1.026  1.003 - 1.035    Blood Urine Trace (*) NEG    pH Urine 5.0  5.0 - 7.0 pH    Protein Albumin Urine 10 (*) NEG mg/dL    Urobilinogen mg/dL Normal  0.0 - 2.0 mg/dL    Nitrite Urine  Negative  NEG    Leukocyte Esterase Urine Negative  NEG    Source Midstream Urine      WBC Urine 1  0 - 2 /HPF    RBC Urine 4 (*) 0 - 2 /HPF    Squamous Epithelial /HPF Urine <1  0 - 1 /HPF    Mucous Urine Present (*) NEG /LPF    Hyaline Casts 3 (*) 0 - 2 /LPF    Calcium Oxalate Moderate (*) NEG /HPF   COMPLEMENT C3       Component Value Range    Complement C3 143  76 - 169 mg/dL   COMPLEMENT C4       Component Value Range    Complement C4 31  15 - 50 mg/dL   HEPATITIS C ANTIBODY       Component Value Range    Hepatitis C Antibody Negative  NEG   CARDIOLIPIN ANTIBODY IGG AND IGM       Component Value Range    Cardiolipin IgG Marline    0 - 15.0 GPL    Value: <15.0      Interpretation:  Negative    Cardiolipin IgM Marline    0 - 12.5 MPL    Value: <12.5      Interpretation:  Negative   LUPUS PANEL       Component Value Range    Lupus Result    NEG    Value: Negative      (Note)      COMMENTS:      The INR is normal.      APTT is normal.  1:2 Mix is not indicated.      DRVVT Screen is normal.      Thrombin time is normal.      NEGATIVE TEST; A LUPUS ANTICOAGULANT WAS NOT DETECTED IN THIS      SPECIMEN WITHIN THE LIMITS OF THE TESTING REPERTOIRE.      If the clinical picture is strongly suggestive of an antiphospholipid      syndrome, recommend anticardiolipin and beta-2-glycoprotein (IgG and      IgM) antibody tests.      Leela Franks M.D.  [phone]      5/2/2013            INR =  0.93 N = 0.86-1.14  (No additional charge)      TT = 15.7 N = 13.0-19.0 sec  (No additional charge)            APTT'S:    Seconds      Reagent =  Stago LA      Normal  =  38      Patient  =  35      1:2 Mix  =  N/A      Reference =  31-43             DILUTE MADELINE VIPER VENOM TEST:      DRVVT Screen Ratio = 0.76 Normal = <1.21         IMMUNOGLOBULIN G SUBCLASSES       Component Value Range    IGG 1030  695 - 1620 mg/dL    IgG1 488  300 - 856 mg/dL    IgG2 325  158 - 761 mg/dL    IgG3 47  24 - 192 mg/dL    IgG4 18  11 - 86 mg/dL   SUNNY  ANTIBODY PANEL       Component Value Range    Ribonucleic Protein IgG Antibody 0      Smith Antibody IgG 1      SSA (RO) Antibody IgG 4      SSB (LA) Antibody IgG 0      Scleroderma Antibody IgG 0     BETA 2 GLYCOPROTEIN ANTIBODIES IGG IGM       Component Value Range    Beta-2-Glycopro IgG 1      Beta-2-Glycopro IgM 5     CYCLIC CIT PEPT IGG       Component Value Range    Cyclic Cit Pept IgG/IgA    <20 UNITS    Value: <20      Interpretation:  Negative   DNA DOUBLE STRANDED ANTIBODIES       Component Value Range    DNA-ds    0 - 29 IU/mL    Value: <15      Interpretation:  Negative       Component      Latest Ref Rng 3/11/2014   Sodium      133 - 144 mmol/L 137   Potassium      3.4 - 5.3 mmol/L 4.6   Chloride      94 - 109 mmol/L 97   Carbon Dioxide      20 - 32 mmol/L 20   Anion Gap      6 - 17 mmol/L 20 (H)   Glucose      60 - 99 mg/dL 243 (H)   Urea Nitrogen      5 - 24 mg/dL 35 (H)   Creatinine      0.52 - 1.04 mg/dL 1.47 (H)   GFR Estimate      >60 mL/min/1.7m2 38 (L)   GFR Estimate If Black      >60 mL/min/1.7m2 46 (L)   Calcium      8.5 - 10.4 mg/dL 9.7   Bilirubin Total      0.2 - 1.3 mg/dL 0.3   Albumin      3.9 - 5.1 g/dL 4.8   Protein Total      6.8 - 8.8 g/dL 7.9   Alkaline Phosphatase      40 - 150 U/L 73   ALT      0 - 50 U/L 35   AST      0 - 45 U/L 30   Color Urine       Yellow   Appearance Urine       Clear   Glucose Urine      NEG mg/dL 500 (A)   Bilirubin Urine      NEG Negative   Ketones Urine      NEG mg/dL Negative   Specific Gravity Urine      1.003 - 1.035 1.020   Blood Urine      NEG Negative   pH Urine      5.0 - 7.0 pH 5.5   Protein Albumin Urine      NEG mg/dL Negative   Urobilinogen Urine      0.2 - 1.0 EU/dL 0.2   Nitrite Urine      NEG Negative   Leukocyte Esterase Urine      NEG Negative   Source       Midstream Urine   WBC      4.0 - 11.0 10e9/L 14.0 (H)   RBC Count      3.8 - 5.2 10e12/L 5.02   Hemoglobin      11.7 - 15.7 g/dL 12.4   Hematocrit      35.0 - 47.0 % 37.1   MCV      78  - 100 fl 74 (L)   MCH      26.5 - 33.0 pg 24.7 (L)   MCHC      31.5 - 36.5 g/dL 33.4   RDW      10.0 - 15.0 % 15.3 (H)   Platelet Count      150 - 450 10e9/L 420       Component      Latest Ref Rn 3/25/2014   Sodium      133 - 144 mmol/L 137   Potassium      3.4 - 5.3 mmol/L 3.7   Chloride      94 - 109 mmol/L 100   Carbon Dioxide      20 - 32 mmol/L 21   Anion Gap      6 - 17 mmol/L 16   Glucose      60 - 99 mg/dL 214 (H)   Urea Nitrogen      5 - 24 mg/dL 20   Creatinine      0.52 - 1.04 mg/dL 1.02   GFR Estimate      >60 mL/min/1.7m2 58 (L)   GFR Estimate If Black      >60 mL/min/1.7m2 70   Calcium      8.5 - 10.4 mg/dL 9.5   Phosphorus      2.5 - 4.5 mg/dL 3.7   Albumin      3.9 - 5.1 g/dL 4.6     Component      Latest Ref Rn 4/30/2014   CRP Inflammation      0.0 - 8.0 mg/L 10.0 (H)   Sed Rate      0 - 20 mm/h 39 (H)   Rheumatoid Factor      <20 IU/mL <20   Glucose-6-PO4 Dehydrogenase       17.7     Component      Latest Ref Rn 6/11/2014 7/15/2014   WBC      4.0 - 11.0 10e9/L 11.0    RBC Count      3.8 - 5.2 10e12/L 4.74    Hemoglobin      11.7 - 15.7 g/dL 11.8    Hematocrit      35.0 - 47.0 % 36.1    MCV      78 - 100 fl 76 (L)    MCH      26.5 - 33.0 pg 24.9 (L)    MCHC      31.5 - 36.5 g/dL 32.7    RDW      10.0 - 15.0 % 13.9    Platelet Count      150 - 450 10e9/L 434    Diff Method       Automated Method    % Neutrophils       59.2    % Lymphocytes       31.6    % Monocytes       5.9    % Eosinophils       2.6    % Basophils       0.5    % Immature Granulocytes       0.2    Absolute Neutrophil      1.6 - 8.3 10e9/L 6.5    Absolute Lymphocytes      0.8 - 5.3 10e9/L 3.5    Absolute Monoctyes      0.0 - 1.3 10e9/L 0.7    Absolute Eosinophils      0.0 - 0.7 10e9/L 0.3    Absolute Basophils      0.0 - 0.2 10e9/L 0.1    Abs Immature Granulocytes      0 - 0.4 10e9/L 0.0    Sodium      133 - 144 mmol/L 138 140   Potassium      3.4 - 5.3 mmol/L 3.9 3.8   Chloride      94 - 109 mmol/L 97 103   Carbon Dioxide       20 - 32 mmol/L 26 22   Anion Gap      6 - 17 mmol/L 14.9 15   Glucose      60 - 99 mg/dL 111 (H) 163 (H)   Urea Nitrogen      5 - 24 mg/dL 28 (H) 15   Creatinine      0.52 - 1.04 mg/dL 1.10 (H) 0.99   GFR Estimate      >60 mL/min/1.7m2 53 (L) 60 (L)   GFR Estimate If Black      >60 mL/min/1.7m2 64 72   Calcium      8.5 - 10.4 mg/dL 10.3 9.3   Bilirubin Total      0.2 - 1.3 mg/dL 0.6 0.2   Albumin      3.9 - 5.1 g/dL 5.1 4.2   Protein Total      6.8 - 8.8 g/dL 9.0 (H) 7.2   Alkaline Phosphatase      40 - 150 U/L 87 69   ALT      0 - 50 U/L 37 33   AST      0 - 45 U/L 40 26   Color Urine       Straw    Appearance Urine       Clear    Glucose Urine      NEG mg/dL Negative    Bilirubin Urine      NEG Negative    Ketones Urine      NEG mg/dL Negative    Specific Gravity Urine      1.003 - 1.035 1.005    Blood Urine      NEG Negative    pH Urine      5.0 - 7.0 pH 5.0    Protein Albumin Urine      NEG mg/dL Negative    Urobilinogen mg/dL      0.0 - 2.0 mg/dL Normal    Nitrite Urine      NEG Negative    Leukocyte Esterase Urine      NEG Negative    Source       Midstream Urine    Lipase      20 - 250 U/L 403 (H)    CRP Inflammation      0.0 - 8.0 mg/L <5.0    N-Terminal Pro Bnp      0 - 125 pg/mL  55   Hemoglobin A1C      4.3 - 6.0 %  7.0 (H)     Component      Latest Ref [facility] 11/12/2014   Sodium      133 - 144 mmol/L 135   Potassium      3.4 - 5.3 mmol/L 3.8   Chloride      94 - 109 mmol/L 104   Carbon Dioxide      20 - 32 mmol/L 24   Anion Gap      3 - 14 mmol/L 7   Glucose      70 - 99 mg/dL 125 (H)   Urea Nitrogen      7 - 30 mg/dL 17   Creatinine      0.52 - 1.04 mg/dL 1.18 (H)   GFR Estimate      >60 mL/min/1.7m2 49 (L)   GFR Estimate If Black      >60 mL/min/1.7m2 59 (L)   Calcium      8.5 - 10.1 mg/dL 9.8   WBC      4.0 - 11.0 10e9/L 11.1 (H)   RBC Count      3.8 - 5.2 10e12/L 4.05   Hemoglobin      11.7 - 15.7 g/dL 9.8 (L)   Hematocrit      35.0 - 47.0 % 30.6 (L)   MCV      78 - 100 fl 76 (L)   MCH      26.5 - 33.0  pg 24.2 (L)   MCHC      31.5 - 36.5 g/dL 32.0   RDW      10.0 - 15.0 % 15.5 (H)   Platelet Count      150 - 450 10e9/L 484 (H)   CT CHEST PULMONARY EMBOLISM W CONTRAST 5/20/2015 4:57 PM  HISTORY: Pain. SOB. Elevated d-dimer.   TECHNIQUE: 65 mL Isovue 370. Axial images with coronal  reconstructions.  COMPARISON: None.  FINDINGS: Calcified granuloma with surrounding scarring in the  posterolateral left upper lobe. Triangular shaped opacity at the right  lung base in the lateral right middle lobe could represent some  scarring, atelectasis or infiltrate. There is also some scarring or  atelectasis in the posteromedial right lung base. Lungs otherwise  clear.  The pulmonary arteries are well opacified. No CT evidence for acute  pulmonary embolus. No aortic dissection.  Diffuse fatty infiltration of the liver.  IMPRESSION  IMPRESSION:   1. No pulmonary embolus identified.  2. Small focus of atelectasis, infiltrate or scarring in the lateral  right middle lobe.  3. Old granulomatous disease.  4. Otherwise negative.  SILVERIO VAZQUEZ MD  Component      Latest Ref Rng 3/27/2015   WBC      4.0 - 11.0 10e9/L 11.8 (H)   RBC Count      3.8 - 5.2 10e12/L 4.53   Hemoglobin      11.7 - 15.7 g/dL 11.0 (L)   Hematocrit      35.0 - 47.0 % 33.8 (L)   MCV      78 - 100 fl 75 (L)   MCH      26.5 - 33.0 pg 24.3 (L)   MCHC      31.5 - 36.5 g/dL 32.5   RDW      10.0 - 15.0 % 15.4 (H)   Platelet Count      150 - 450 10e9/L 419   Diff Method       Automated Method   % Neutrophils       75.3   % Lymphocytes       17.4   % Monocytes       4.4   % Eosinophils       2.5   % Basophils       0.2   % Immature Granulocytes       0.2   Absolute Neutrophil      1.6 - 8.3 10e9/L 8.9 (H)   Absolute Lymphocytes      0.8 - 5.3 10e9/L 2.1   Absolute Monoctyes      0.0 - 1.3 10e9/L 0.5   Absolute Eosinophils      0.0 - 0.7 10e9/L 0.3   Absolute Basophils      0.0 - 0.2 10e9/L 0.0   Abs Immature Granulocytes      0 - 0.4 10e9/L 0.0   Creatinine      0.52 -  1.04 mg/dL 1.12 (H)   GFR Estimate      >60 mL/min/1.7m2 52 (L)   GFR Estimate If Black      >60 mL/min/1.7m2 63   Iron      35 - 180 ug/dL 25 (L)   Iron Binding Cap      240 - 430 ug/dL 346   Iron Saturation Index      15 - 46 % 7 (L)   Albumin      3.4 - 5.0 g/dL 4.0   ALT      0 - 50 U/L 24   AST      0 - 45 U/L 15   CRP Inflammation      0.0 - 8.0 mg/L 28.0 (H)   Sed Rate      0 - 20 mm/h 81 (H)   Rheumatoid Factor      <20 IU/mL <20   Vitamin D Deficiency screening      30 - 75 ug/L 44   Ferritin      8 - 252 ng/mL 34     Component      Latest Ref Rng 7/29/2015   Sodium      133 - 144 mmol/L 137   Potassium      3.4 - 5.3 mmol/L 3.8   Chloride      94 - 109 mmol/L 106   Carbon Dioxide      20 - 32 mmol/L 23   Anion Gap      3 - 14 mmol/L 8   Glucose      70 - 99 mg/dL 148 (H)   Urea Nitrogen      7 - 30 mg/dL 17   Creatinine      0.52 - 1.04 mg/dL 1.02   GFR Estimate      >60 mL/min/1.7m2 58 (L)   GFR Estimate If Black      >60 mL/min/1.7m2 70   Calcium      8.5 - 10.1 mg/dL 9.0   Bilirubin Total      0.2 - 1.3 mg/dL 0.5   Albumin      3.4 - 5.0 g/dL 4.2   Protein Total      6.8 - 8.8 g/dL 7.4   Alkaline Phosphatase      40 - 150 U/L 64   ALT      0 - 50 U/L 23   AST      0 - 45 U/L 19     Results for FAVIO MARTINEZ (MRN [id number]) as of 10/30/2015 17:00   Ref. Range 8/21/2012 09:06 5/22/2013 14:22 4/14/2014 12:06 9/11/2014 12:35 12/4/2014 16:38   TSH Latest Range: 0.40-4.00 mU/L 0.83 0.43 0.27 (L) 0.23 (L) 0.22 (L)     Component      Latest Ref Rng 10/30/2015   WBC      4.0 - 11.0 10e9/L 13.4 (H)   RBC Count      3.8 - 5.2 10e12/L 4.76   Hemoglobin      11.7 - 15.7 g/dL 12.4   Hematocrit      35.0 - 47.0 % 37.9   MCV      78 - 100 fl 80   MCH      26.5 - 33.0 pg 26.1 (L)   MCHC      31.5 - 36.5 g/dL 32.7   RDW      10.0 - 15.0 % 14.5   Platelet Count      150 - 450 10e9/L 324   Diff Method       Automated Method   % Neutrophils       67.7   % Lymphocytes       22.7   % Monocytes       6.3   % Eosinophils        2.7   % Basophils       0.4   % Immature Granulocytes       0.2   Absolute Neutrophil      1.6 - 8.3 10e9/L 9.1 (H)   Absolute Lymphocytes      0.8 - 5.3 10e9/L 3.1   Absolute Monoctyes      0.0 - 1.3 10e9/L 0.9   Absolute Eosinophils      0.0 - 0.7 10e9/L 0.4   Absolute Basophils      0.0 - 0.2 10e9/L 0.1   Abs Immature Granulocytes      0 - 0.4 10e9/L 0.0   Creatinine      0.52 - 1.04 mg/dL 1.21 (H)   GFR Estimate      >60 mL/min/1.7m2 47 (L)   GFR Estimate If Black      >60 mL/min/1.7m2 57 (L)   Iron      35 - 180 ug/dL 70   Iron Binding Cap      240 - 430 ug/dL 428   Iron Saturation Index      15 - 46 % 16   Albumin      3.4 - 5.0 g/dL 4.6   ALT      0 - 50 U/L 29   AST      0 - 45 U/L 21   CRP Inflammation      0.0 - 8.0 mg/L <2.9   Sed Rate      0 - 20 mm/h 8   Vitamin D Deficiency screening      20 - 75 ug/L 46   Ferritin      8 - 252 ng/mL 18   TSH      0.40 - 4.00 mU/L 0.49   T4 Free      0.76 - 1.46 ng/dL 1.06   Free T3      2.3 - 4.2 pg/mL 2.8     Component      Latest Ref Rng 11/17/2015   Testosterone Total      8 - 60 ng/dL 19   Sex Hormone Binding Globulin      30 - 135 nmol/L 32   Free Testosterone Calculated      0.11 - 0.58 ng/dL 0.34   Vitamin A       0.61   Retinol Palmitate       <0.02 . . .   Vitamin A Interp       Normal . . .   Thyroglobulin Antibody      <40 IU/mL <20   Thyroid Peroxidase Antibody      <35 IU/mL 31   DHEA Sulfate      35 - 430 ug/dL 101   Zinc       68     Component      Latest Ref Rng 1/27/2016   Sodium      133 - 144 mmol/L 135   Potassium      3.4 - 5.3 mmol/L 4.0   Chloride      94 - 109 mmol/L 104   Carbon Dioxide      20 - 32 mmol/L 24   Anion Gap      3 - 14 mmol/L 7   Glucose      70 - 99 mg/dL 88   Urea Nitrogen      7 - 30 mg/dL 20   Creatinine      0.52 - 1.04 mg/dL 1.13 (H)   GFR Estimate      >60 mL/min/1.7m2 51 (L)   GFR Estimate If Black      >60 mL/min/1.7m2 62   Calcium      8.5 - 10.1 mg/dL 9.4   Bilirubin Total      0.2 - 1.3 mg/dL 0.7   Albumin      3.4 -  "5.0 g/dL 4.3   Protein Total      6.8 - 8.8 g/dL 7.7   Alkaline Phosphatase      40 - 150 U/L 66   ALT      0 - 50 U/L 24   AST      0 - 45 U/L 18   Cholesterol      <200 mg/dL 112   Triglycerides      <150 mg/dL 100   HDL Cholesterol      >49 mg/dL 34 (L)   LDL Cholesterol Calculated      <100 mg/dL 58   Non HDL Cholesterol      <130 mg/dL 78   N-Terminal Pro Bnp      0 - 125 pg/mL 29   Hemoglobin A1C      4.3 - 6.0 % 7.0 (H)   Amylase      30 - 110 U/L 60   Lipase      73 - 393 U/L 394 (H)   Troponin I ES      0.000 - 0.045 ug/L <0.015 . . .     Component      Latest Ref Rng 2/24/2016   Angiotensin Converting Enzyme       <5 (L) . . .   Neutrophil Cytoplasmic IgG Antibody       <1:20 . . .   Sed Rate      0 - 20 mm/h 86 (H)     Copath Report      Patient Name: FAVIO MARTINEZ   MR#: [id number]   Specimen #: [id number]   Collected: 3/15/2016   Received: 3/15/2016   Reported: 3/17/2016 13:33   Ordering Phy(s): PARVEEN ENRIQUEZ     ORIGINAL REPORT FOLLOWS ADDENDUM  ADDENDUM     TO ORIGINAL REPORT   Status: Signed Out   Date Ordered:3/18/2016   Date Complete:3/18/2016   Date Reported:3/18/2016 12:06   Signed Out By: Marianne Godfrey MD     INTERPRETATION:   This addendum is done to incorporate the results of fungal (GMS) stains   done on the specimen.  Specimen is negative for fungal organisms.  The   original final diagnosis remains unchanged.     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  Negative for malignancy(Please   see microscopic description)     Electronically signed out by:     Marianne Godfrey MD     CLINICAL HISTORY:   right orbital mass     GROSS:   The specimen is received labeled \"right orbital biopsy\" and consists of   red-tan nodule measuring 1.5 x 0.9 x 0.6 cm with one smooth " side and   opposite rough side consistent with periosteum.  The specimen is   bisected revealing homogenous pale tan fleshy cut surface.  Touch   preparations are prepared, air dried and fixed, portion of specimen is   submitted in RPMI for possible lymphoma workup [facility],   ([facility], [place], [place] ).  The remainder is entirely submitted.   (Dictated by: Marianne Godfrey MD 3/15/2016 04:45 PM)     MICROSCOPIC:     Specimen consists of portion of lacrimal gland with acute and chronic   inflammation.  Focal area of microabscess formation associated with   small areas of necrosis are also present.  Inflammation is seen   extending to the periorbital adipose tissue forming panniculitis.   Specimen is negative for malignancy.  Samples sent for immunophenotyping   to [facility], ([facility], [place], [place] ) reveals no evidence   of monoclonality or aberrant antigen expression.  A GMS (fungal) stain   is pending and results will be reported as an addendum.     CPT Codes:   A: 71798-CC0, 34862-CSPNQ, SOH, 80346-HCSOR, 19842-TOAZ     TESTING LAB LOCATION:   [address]  [id number] [phone]     COLLECTION SITE:   [facility]   [facility]            Component      Latest Ref Rng 4/29/2016   Nucleated RBCs      0 /100 0   Absolute Neutrophil      1.6 - 8.3 10e9/L 8.9 (H)   Absolute Lymphocytes      0.8 - 5.3 10e9/L 3.2   Absolute Monocytes      0.0 - 1.3 10e9/L 0.8   Absolute Eosinophils      0.0 - 0.7 10e9/L 0.2   Absolute Basophils      0.0 - 0.2 10e9/L 0.1   Abs Immature Granulocytes      0 - 0.4 10e9/L 0.1   Absolute Nucleated RBC       0.0   IGG      695 - 1620 mg/dL 836   IgG1      300 - 856 mg/dL 280 (L)   IgG2      158 - 761 mg/dL 277   IgG3      24 - 192 mg/dL 35   IgG4      11 - 86 mg/dL 16   RNP Antibody IgG      0.0 - 0.9 AI <0.2 . . .   Smith SUNNY Antibody IgG      0.0 - 0.9 AI <0.2 . . .   SSA (Ro)  (SUNNY) Antibody, IgG      0.0 - 0.9 AI <0.2 . . .   SSB (La) (SUNNY) Antibody, IgG      0.0 - 0.9 AI <0.2 . . .   Scleroderma Antibody Scl-70 SUNNY IgG      0.0 - 0.9 AI <0.2 . . .   Cholesterol      <200 mg/dL 154   Triglycerides      <150 mg/dL 220 (H)   HDL Cholesterol      >49 mg/dL 42 (L)   LDL Cholesterol Calculated      <100 mg/dL 67   Non HDL Cholesterol      <130 mg/dL 111   M Tuberculosis Result      NEG Negative   M Tuberculosis Antigen Value       0.00   Albumin      3.4 - 5.0 g/dL 4.3   ALT      0 - 50 U/L 30   AST      0 - 45 U/L 10   Complement C3      76 - 169 mg/dL 157   Complement C4      15 - 50 mg/dL 32   CRP Inflammation      0.0 - 8.0 mg/L <2.9   Sed Rate      0 - 20 mm/h 2   DNA-ds      <10 IU/mL 1   Cyclic Citrullinated Peptide Antibody, IgG      <7 U/mL 1   Rheumatoid Factor      <20 IU/mL <20   Proteinase 3 Antibody IgG      0.0 - 0.9 AI <0.2 . . .   Myeloperoxidase Antibody IgG      0.0 - 0.9 AI 2.5 (H)   Vitamin D Deficiency screening      20 - 75 ug/L 24   Hemoglobin A1C      4.3 - 6.0 % 7.9 (H)   ALLI by IFA IgG       1:40 (A) . . .     Component      Latest Ref Rng & Units 4/7/2017   WBC      4.0 - 11.0 10e9/L 11.3 (H)   RBC Count      3.8 - 5.2 10e12/L 4.77   Hemoglobin      11.7 - 15.7 g/dL 12.5   Hematocrit      35.0 - 47.0 % 38.7   MCV      78 - 100 fl 81   MCH      26.5 - 33.0 pg 26.2 (L)   MCHC      31.5 - 36.5 g/dL 32.3   RDW      10.0 - 15.0 % 13.2   Platelet Count      150 - 450 10e9/L 347   Diff Method       Automated Method   % Neutrophils      % 67.1   % Lymphocytes      % 22.9   % Monocytes      % 6.2   % Eosinophils      % 3.0   % Basophils      % 0.4   % Immature Granulocytes      % 0.4   Nucleated RBCs      0 /100 0   Absolute Neutrophil      1.6 - 8.3 10e9/L 7.5   Absolute Lymphocytes      0.8 - 5.3 10e9/L 2.6   Absolute Monocytes      0.0 - 1.3 10e9/L 0.7   Absolute Eosinophils      0.0 - 0.7 10e9/L 0.3   Absolute Basophils      0.0 - 0.2 10e9/L 0.1   Abs Immature  Granulocytes      0 - 0.4 10e9/L 0.1   Absolute Nucleated RBC       0.0   IGG      695 - 1620 mg/dL 962   IgG1      300 - 856 mg/dL Test sent to [facility]. See ARMISC.   IgG2      158 - 761 mg/dL Test sent to [facility]. See ARMISC.   IgG3      24 - 192 mg/dL Test sent to [facility]. See ARMISC.   IgG4      11 - 86 mg/dL Test sent to [facility]. See ARMISC.   Result       SEE NOTE . . .   Test Name       IGG SUBCLASSES   Send Outs Misc Test Code       92952   Send Outs Misc Test Specimen       Serum   Creatinine      0.52 - 1.04 mg/dL 1.20 (H)   GFR Estimate      >60 mL/min/1.7m2 47 (L)   GFR Estimate If Black      >60 mL/min/1.7m2 57 (L)   Creatinine Urine      mg/dL    Albumin Urine mg/L      mg/L    Albumin Urine mg/g Cr      0 - 25 mg/g Cr    Myeloperoxidase Antibody IgG      0.0 - 0.9 AI 2.9 (H)   Proteinase 3 Antibody IgG      0.0 - 0.9 AI <0.2 . . .   Neutrophil Cytoplasmic IgG Antibody       1:80 (A) . . .   Angiotensin Converting Enzyme       <5 (L) . . .   Lab Scanned Result       TPMT GENOTYPE-Scanned   Vitamin C       56   ALT      0 - 50 U/L 23   Albumin      3.4 - 5.0 g/dL 4.3   AST      0 - 45 U/L 18   CRP Inflammation      0.0 - 8.0 mg/L 3.7   Sed Rate      0 - 30 mm/h 17   Vitamin D Deficiency screening      20 - 75 ug/L 40   Hemoglobin A1C      4.3 - 6.0 %      Component      Latest Ref Rng & Units 4/26/2017   WBC      4.0 - 11.0 10e9/L 11.8 (H)   RBC Count      3.8 - 5.2 10e12/L 4.23   Hemoglobin      11.7 - 15.7 g/dL 11.2 (L)   Hematocrit      35.0 - 47.0 % 35.0   MCV      78 - 100 fl 83   MCH      26.5 - 33.0 pg 26.5   MCHC      31.5 - 36.5 g/dL 32.0   RDW      10.0 - 15.0 % 13.4   Platelet Count      150 - 450 10e9/L 304   Diff Method       Automated Method   % Neutrophils      % 66.2   % Lymphocytes      % 22.7   % Monocytes      % 6.5   % Eosinophils      % 3.9   % Basophils      % 0.4   % Immature Granulocytes      % 0.3   Nucleated RBCs      0 /100 0   Absolute Neutrophil      1.6 - 8.3 10e9/L 7.8   Absolute  Lymphocytes      0.8 - 5.3 10e9/L 2.7   Absolute Monocytes      0.0 - 1.3 10e9/L 0.8   Absolute Eosinophils      0.0 - 0.7 10e9/L 0.5   Absolute Basophils      0.0 - 0.2 10e9/L 0.1   Abs Immature Granulocytes      0 - 0.4 10e9/L 0.0   Absolute Nucleated RBC       0.0   IGG      695 - 1620 mg/dL    IgG1      300 - 856 mg/dL    IgG2      158 - 761 mg/dL    IgG3      24 - 192 mg/dL    IgG4      11 - 86 mg/dL    Result          Test Name          Send Outs Misc Test Code          Send Outs Misc Test Specimen          Creatinine      0.52 - 1.04 mg/dL 1.24 (H)   GFR Estimate      >60 mL/min/1.7m2 46 (L)   GFR Estimate If Black      >60 mL/min/1.7m2 55 (L)   Creatinine Urine      mg/dL 121   Albumin Urine mg/L      mg/L <5   Albumin Urine mg/g Cr      0 - 25 mg/g Cr Unable to calculate due to low value   Myeloperoxidase Antibody IgG      0.0 - 0.9 AI    Proteinase 3 Antibody IgG      0.0 - 0.9 AI    Neutrophil Cytoplasmic IgG Antibody          Angiotensin Converting Enzyme          Lab Scanned Result          Vitamin C          ALT      0 - 50 U/L 25   Albumin      3.4 - 5.0 g/dL 4.1   AST      0 - 45 U/L 15   CRP Inflammation      0.0 - 8.0 mg/L    Sed Rate      0 - 30 mm/h    Vitamin D Deficiency screening      20 - 75 ug/L    Hemoglobin A1C      4.3 - 6.0 % 7.8 (H)   EXAMINATION: CT CHEST ABDOMEN PELVIS W/O CONTRAST, 4/7/2017 2:59 PM     TECHNIQUE: Helical CT images from the thoracic inlet through the  symphysis pubis were obtained without IV contrast.     COMPARISON: CT chest on 5/20/2015. CT abdomen pelvis on 6/11/2014     HISTORY: Granuloma of right orbit. Concern for malignancy, lymphoma.     Additional history from the EMR: 50-year-old female with rheumatoid  arthritis and fibromyalgia. Presented on April 2016 with new right  orbital inflammatory mass, biopsied showed panniculitis without  infection or malignancy. Some intermittent swelling around her right  orbit.     FINDINGS:     Chest: Cardiac size is not  enlarged. No pericardial effusion.  Calcified subcentimeter mediastinal and left hilar lymph nodes. No  thoracic adenopathy. Coronary artery calcifications/stents.  Benign-appearing coarse calcifications in the thyroid, better  described on ultrasound thyroid from 9/13/2016.     Central airways are patent. No pneumothorax or pleural effusion.  Calcified pulmonary granuloma in the posterior left upper lobe,  benign. Small focus of subpleural fibrosis and cysts along the  anterior lateral right lower lobe (image 96 series 6).     Abdomen and pelvis: Cholecystectomy. The liver, adrenal glands,  kidneys, spleen, pancreas, stomach, duodenum are without focal  abnormality on these noncontrast images.     No abdominal aortic aneurysm. No suspicious adenopathy in the abdomen  or pelvis. Bladder is intact. Calcified fibroid off the uterine  fundus. IUD in the uterus.     No focal bowel wall thickening or obstruction. No free air or free  fluid. Appendix is normal. Small periumbilical hernia.     Bones and soft tissues: No suspicious osseous lesions. Unremarkable  superficial soft tissues.         IMPRESSION: No evidence of malignancy in the chest, abdomen, or  pelvis.        I have personally reviewed the examination and initial interpretation  and I agree with the findings.     CONNIE BRICE  ROS:  A comprehensive ROS was done, positives are per HPI.        HISTORY REVIEW:  Past Medical History:   Diagnosis Date     Abnormal glandular Papanicolaou smear of cervix 1992     Allergic rhinitis     Allergy, airborne subst     Arthritis      ASCVD (arteriosclerotic cardiovascular disease)      Chronic pain      Chronic pancreatitis (H)     idiopathic, Tx: PPI, antioxidants, pancreatic enzymes     Common migraine      Coronary artery disease      Costochondritis      Costochondritis      Difficulty in walking(719.7)      Ectasia, mammary duct     [facility], persistent nipple discharge     Elevated fasting glucose       Gastro-oesophageal reflux disease      Hernia      Hyperlipidemia LDL goal < 100      Hypertension goal BP (blood pressure) < 140/90     Essential hypertension     Iron deficiency anemia      Ischemic cardiomyopathy      Menorrhagia      Migraine headaches      Mild persistent asthma      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 Matias, neg w/u, Rx'd w/prednisone     Ventral hernia, unspecified, without mention of obstruction or gangrene 2012     Past Surgical History:   Procedure Laterality Date     C ECHO HEART XTHORACIC,COMPLETE, W/O DOPPLER  04    [facility] WNL, EF 60%     C/SECTION, LOW TRANSVERSE           CARDIAC SURGERY      cardiac stent- recent in 16; 4 other stents     DILATION AND CURETTAGE N/A 2016    Procedure: DILATION AND CURETTAGE;  Surgeon: Nahde Butler MD;  Location: [facility] OR     HC UGI ENDOSCOPY W EUS  08    Dr. Pastrana, possible chronic pancreatitis, fatty liver     [place] [facility] [place] INTRAUTERINE DEVICE N/A 2016    Procedure: INSERT INTRAUTERINE DEVICE;  Surgeon: Nahed Butler MD;  Location: [facility] OR     INT UTERINE FIBRIOD EMBOLIZATION  10/29/2014     LAPAROSCOPIC CHOLECYSTECTOMY  08    Dr. Arnol GRUBBS TX, CERVICAL      s/p LEEP     ORBITOTOMY Right 3/15/2016     Procedure: ORBITOTOMY;  Surgeon: Myron Cyr MD;  Location: [facility]     UPPER GI ENDOSCOPY  10/21/08    mild gastritis, Dr. Hidalgo     Family History   Problem Relation Age of Onset     HEART DISEASE Brother 15     MI at 15, 16.      HEART DISEASE Father 50     heart attack     CEREBROVASCULAR DISEASE Father      DIABETES Father      Hypertension Father      Depression Father      C.A.D. Father      DIABETES Maternal Uncle      Hypertension Mother      Arthritis Mother      HEART DISEASE Mother      long qt syndrome     Thyroid Disease Mother      C.A.D. Mother      Hypertension Maternal Aunt      Hypertension Maternal Uncle      Arthritis Brother      he passed away, had severe arthritis at age 11     Arthritis Maternal Uncle      Eye Disorder Maternal Uncle      cataracts     Neurologic Disorder Sister      migraines     Neurologic Disorder Sister      migraines     Respiratory Son      asthma     CEREBROVASCULAR DISEASE Maternal Uncle      C.A.D. Brother      Family History Negative       neg for RA, SLE     Unknown/Adopted No family hx of      unknown neurological issues in her family, mother was adopted     Skin Cancer No family hx of      No known family hx of skin cancer     Social History     Social History     Marital status: Single     Spouse name: N/A     Number of children: 1     Years of education: N/A     Occupational History      None      Social History Main Topics     Smoking status: Former Smoker     Packs/day: 0.20     Years: 1.00     Types: Cigarettes     Quit date: 2016     Smokeless tobacco: Never Used     Alcohol use No     Drug use: No     Sexual activity: Not Currently     Other Topics Concern     Parent/Sibling W/ Cabg, Mi Or Angioplasty Before 65f 55m? Yes     Social History Narrative    Balanced Diet - sometimes    Osteoporosis Prevention Measures - Dairy servings per day: 2 servings weekly    Regular Exercise -  Yes Describe walking 4 times a week    Dental Exam -  NO    Seatbelts used - Yes    Self Breast Exam - Yes    Abuse: Current or Past (Physical, Sexual or Emotional)- No    Do you have any concerns about STD's -  No    Do you feel safe in your environment - No    Guns stored in the home - No    Sunscreen used - Yes    Lipids -  YES - Date:     Glucose -  YES - Date:     Eye Exam - YES - Date: one year ago    Colon Cancer Screening - No    Hemoccults - NO    Pap Test -  YES - Date: , remote history of LEEP    Mammography - YES - Date: last spring, would like to discuss, needs a referral to [facility]    DEXA - NO    Immunizations reviewed and up to date - Yes, last td given in  or      Patient Active Problem List   Diagnosis     Seasonal affective disorder (H)     Allergic rhinitis     PCOS (polycystic ovarian syndrome)     Moderate persistent asthma     Chronic pancreatitis (H)     Hypertension goal BP (blood pressure) < 140/90     Common migraine     NSTEMI (non-ST elevated myocardial infarction) (H)     Hyperlipidemia LDL goal <70     ASCVD (arteriosclerotic cardiovascular disease)     GERD (gastroesophageal reflux disease)     Ischemic cardiomyopathy     Hypertensive heart disease     Uterine leiomyoma     Iron deficiency anemia     Costochondritis     Vitamin D deficiency     Breast cancer screening     Spinal stenosis in cervical region     Fibromyalgia     Seronegative rheumatoid arthritis (H)     Type 2 diabetes, HbA1C goal < 8% (H)     Type 2 diabetes mellitus with other specified complication (H)     Hyperlipidemia associated with type 2 diabetes mellitus (H)     Hypertension associated with diabetes (H)     Overweight with body mass index (BMI) 25.0-29.9     Tobacco use disorder     Telogen effluvium     CAD S/P percutaneous coronary angioplasty     Status post coronary angiogram       Pregnancy Hx: She is . All misscarriages were in first trimester. H/o OC use in the past. Stopped OC in  after having sudden  blindness of R eye.    PMHx, FHx, SHx were reviewed, unchanged.      Outpatient Encounter Prescriptions as of 5/5/2017   Medication Sig Dispense Refill     methotrexate 2.5 MG tablet CHEMO Take 4 tablets (10 mg) by mouth once a week Take 4 tablets (10mg) by mouth weekly. 16 tablet 2     lidocaine (XYLOCAINE) 5 % ointment Apply 1 g topically 2 times daily as needed for moderate pain 50 g 5     Ketoprofen POWD Apply 1 g topically 2 times daily as needed 100 g 5     hydrALAZINE (APRESOLINE) 25 MG tablet Take 0.5 tablets (12.5 mg) by mouth daily . May take additional one-half tablet once daily if systolic blood pressure >140 mmHg. 90 tablet 3     traMADol (ULTRAM) 50 MG tablet Take 1 tablet (50 mg) by mouth every 8 hours as needed for moderate pain 60 tablet 3     folic acid (FOLVITE) 1 MG tablet 1 tab daily 90 tablet 2     fluconazole (DIFLUCAN) 100 MG tablet Take 100 mg by mouth daily as needed       hydroxychloroquine (PLAQUENIL) 200 MG tablet Take 2 tablets (400 mg) by mouth daily EYE EXAM DUE/LETTER SENT 180 tablet 0     insulin glargine (BASAGLAR KWIKPEN) 100 UNIT/ML injection Inject 40 Units Subcutaneous daily 9 mL 3     ranitidine (ZANTAC) 150 MG tablet Take 1 tablet (150 mg) by mouth 2 times daily 60 tablet 2     ferrous gluconate (FERGON) 324 (38 FE) MG tablet Take 1 tablet (324 mg) by mouth 2 times daily 180 tablet 1     ASPIRIN LOW DOSE 81 MG EC tablet TAKE 1 TABLET BY MOUTH EVERY DAY 90 tablet 3     blood glucose monitoring (ONE TOUCH DELICA) lancets Use to test blood sugars 4 times daily or as directed. 4 Box 3     vitamin D (ERGOCALCIFEROL) 71454 UNIT capsule Take 1 capsule (50,000 Units) by mouth every 7 days Need a Vitamin D level in 2 months. (Patient taking differently: Take 50,000 Units by mouth every 7 days Need a Vitamin D level in 2 months.) 8 capsule 0     metoprolol (TOPROL-XL) 100 MG 24 hr tablet Take 1 tablet (100 mg) by mouth daily 90 tablet 3     clopidogrel (PLAVIX) 75 MG tablet Take 1  tablet (75 mg) by mouth daily 90 tablet 3     lisinopril-hydrochlorothiazide (PRINZIDE/ZESTORETIC) 20-25 MG per tablet TAKE 1 TABLET BY MOUTH DAILY 90 tablet 3     blood glucose monitoring (NO BRAND SPECIFIED) meter device kit Use to test blood sugar 4 X times daily or as directed. 1 kit 0     blood glucose monitoring (NO BRAND SPECIFIED) test strip 1 strip by In Vitro route 4 times daily Test as directed. Please dispense three months and three months of refills. Thank you. 360 each 3     diphenhydrAMINE (BENADRYL) 25 MG capsule TK 1 TO 2 CS PO QHS  4     EPIPEN 2-RIKY 0.3 MG/0.3ML injection INJECT 0.3 MG INTO THE MUSCLE PRF ANAPHYALAXIS  0     AEROSPAN 80 MCG/ACT AERS INHALE 2 PUFFS PO BID.  RINSE MOUTH AFTERWARDS  4     fluticasone (FLONASE) 50 MCG/ACT spray U 2 SPRAYS IEN D.  3     Magnesium Oxide -Mg Supplement 250 MG TABS TK 1 T PO BID. REDUCE IF STOOLS LOOSEN  11     nystatin (MYCOSTATIN) 346067 UNITS TABS tablet TK 2 TS PO BID  0     albuterol (2.5 MG/3ML) 0.083% neb solution INL 1 VIAL VIA NEBULIZATION Q 4 TO 6 HOURS PRN  1     dicyclomine (BENTYL) 20 MG tablet Take 1 tablet (20 mg) by mouth 4 times daily as needed 60 tablet 5     sucralfate (CARAFATE) 1 GM tablet Take 1 tablet (1 g) by mouth 4 times daily as needed 120 tablet 3     azelastine (ASTELIN) 0.1 % spray Spray 2 sprays into both nostrils 2 times daily 1 Bottle 3     fluocinolone (SYNALAR) 0.01 % solution Apply topically daily as needed       mometasone (NASONEX) 50 MCG/ACT spray Spray 2 sprays into both nostrils daily       cyclobenzaprine (FLEXERIL) 10 MG tablet Take 1 tablet (10 mg) by mouth 2 times daily as needed for muscle spasms 180 tablet 0     Ondansetron HCl (ZOFRAN PO)        pravastatin (PRAVACHOL) 40 MG tablet Take 1 tablet (40 mg) by mouth daily 30 tablet 11     spironolactone (ALDACTONE) 25 MG tablet Take 2 tablets (50 mg) by mouth daily 60 tablet 11     metFORMIN (GLUCOPHAGE-XR) 500 MG 24 hr tablet Take 2 tablets (1,000 mg) by mouth  daily (with dinner) 60 tablet 11     montelukast (SINGULAIR) 10 MG tablet Take 1 tablet (10 mg) by mouth At Bedtime 30 tablet 1     ESOMEPRAZOLE MAGNESIUM PO Take 20 mg by mouth 2 times daily (before meals)       insulin pen needle 32G X 4 MM Use 1 daily as directed. 300 each 3     acetaminophen (TYLENOL) 325 MG tablet Take 1-2 tablets (325-650 mg) by mouth every 6 hours as needed for pain (headache) 250 tablet 0     metroNIDAZOLE (NORITATE) 1 % cream Apply topically daily       desonide (DESOWEN) 0.05 % cream Apply topically 2 times daily       ketoconazole (NIZORAL) 2 % shampoo Apply topically daily as needed       fluocinonide (LIDEX) 0.05 % external solution Apply topically 2 times daily To areas of itch on the scalp as needed. 60 mL 11     sucralfate (CARAFATE) 1 GM tablet Take 1 tablet (1 g) by mouth 4 times daily as needed 40 tablet 1     nitroglycerin (NITROSTAT) 0.4 MG SL tablet Place 1 tablet (0.4 mg) under the tongue every 5 minutes as needed for chest pain 25 tablet 1     albuterol (PROAIR HFA, PROVENTIL HFA, VENTOLIN HFA) 108 (90 BASE) MCG/ACT inhaler Inhale 2 puffs into the lungs every 6 hours as needed for shortness of breath / dyspnea or wheezing 3 Inhaler 1     calcium carbonate (TUMS) 500 MG chewable tablet Take 3-4 chew tab by mouth daily as needed.       fexofenadine (ALLEGRA) 180 MG tablet Take 1 tablet by mouth daily as needed. 90 tablet 3     olopatadine (PATANOL) 0.1 % ophthalmic solution Place 1 drop into both eyes 2 times daily as needed for allergies. 5 mL 12     No facility-administered encounter medications on file as of 5/5/2017.        Allergies   Allergen Reactions     Amoxicillin-Pot Clavulanate      Augmentin      Unknown possible hives and edema     Azithromycin      Diatrizoate Other (See Comments)     Pt wants this listed because she is allergic to shellfish      Imitrex [Sumatriptan]      Severe face/neck/chest tightness and flushing side effects      Penicillins Hives      "Unknown      Pork Allergy      Stomach pain, cramping, diarrhea, itching, nausea and headaches     Shellfish Allergy Hives and Swelling     Sulfa Drugs Hives and Swelling     Zithromax [Azithromycin Dihydrate] Swelling     Unknown          Ph.E:    Vitals:    05/05/17 1458   BP: 109/72   Pulse: 79   Temp: 98.3  F (36.8  C)   TempSrc: Oral   SpO2: 99%   Weight: 76.4 kg (168 lb 6.4 oz)   Height: 1.6 m (5' 3\")           Constitutional: WD/WN. Pleasant. In no acute distress. Obese.  Eyes: Swelling around R eye worsened since last visit, EOMI  HEENT: No oral ulcers or thrush. Normal salivary pool.  Neck: No cervical LAP, + thyromegaly  Chest: Clear to auscultation bilaterally  CV: RRR, no murmurs/ rubs or gallops. No edema, clubbing or cyanosis.   GI: Abdomen is soft and non tender.  MS: No synovitis or joint tenderness. Cool joints. No joint deformities. Full ROM of the joints. No nodules. +Fibromyalgia trigger points.  Skin: No livedo, periungual erythema, digital ulcers or nail changes. + alopecia with scales, facial malar erythema (looks like seborrhoic dermatitis).  Neuro: A&O x 3. Grossly non focal, muscular power 5/5 in all ext  Psych: NL affect and mood    Assessment/ plan:    1-Seropositive non-erosive RA (arthritis, AM stiffness, high CRP, RF 26 but re-check neg 3/2015 on HCQ) Dx 5/2013, FMS, new pleuritic CP 3/20-15-5/2015 resolved on steroids. She is on  mg bid since 5/2014. She tolerates it well and it's helping some but not enough to control all her pain. Continues having flare ups of joint pain, swelling also has FMS. Joint sx are getting worse. Had high ESR/CRP in 3/2015 and new pleuritic CP between 3/2015-5/2015 which resolved after taking medrol dose pack prescribed 5/20/2015. Her pleuritic CP could be related to her RA. Then stopped tramadol as it blames it for recent episodes of CP.    In the past, MTX was recommended, she declined.    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 Etiology of mass unclear, but it's inflammatory and related to pt's underlying autoimmune disease (inflammatory arthritis, serositis). 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    Her work up showed borderline + ALLI and slightly elevated MPO. I told her prednisone is not good for her diabetes and weight gain. Recommended a trial of MTX as next step. Discussed risks on details. I called her neuro-ophthalmologist at last visit who agreed with trial of MTX (she suspects pseudo-sarcoidosis or sarcoid like disease but no granuloma and ACE not elevated).     Unfortunately despite all my efforts to explain risks vs benefits (more than risks) of MTX as steroid sparing gent, Janine declined to try MTX. I was very concerned about her R orbital inflammatory mass as there is high chance of flare up off steroids and steroid causes her weigh gain and uncontrolled diabetes. I even offered her other steroid sparing gents like imuran. She declined that as well.    Her inflammation around R orbit has recurred now. On 4/7/2017, I spent quite amount of time discussing a trial of MTX. After discussing risks/benefits, she agreed to try it.     She is on MTX 10 mg po qwk since 4/7/2017. No benefits and more GI SEs, more hair loss and headaches since start of MTX. No benefits yet. I called and spoke with her neuro-ophthalmologist who recommended a second opinion from neuro-ophthalmology at the . I suspect her presentation to be ANCA vasculitis related but would like to repeat imaging and get neuro-ophthalmology eval here as might need repeat biopsy to r/o lymphoma.    PLAN:    Prednisone taper: 40-30-20-10 mg a day each for 3 days then stop (she declined higher dose and longer taper despite my concern for worsening swelling  around orbit)    Stop methotrexate given side effects    Start imuran 50 mg a day, NL TPMT and risks were discussed.    Neuro-ophthalmology referral    MRI brain and orbit with contrast    Labs in 4 weeks for AZA monitoring    Return 6/15 10:30 am      CT chest/abdomen/pelvis 4/2017 was neg for malignancy. 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    Continue accupuncture.     My impression is that her arthralgias are a combination of IA, fibromyalgia and chronic pain.  She does not think HCQ is beneficial and wants to stop, OK to stop but resume if arthralgia gets worse then would need annual eye exam    2-Fad pads. She was seen by endocrinology and cushing was ruled out in 4/2014. Was advised to do f/u for enlarged thyroid/thyroid nodules.    3-Hair loss. F/U with dermatology    4-Chronic pain. F/U with pain clinic    5-Concerns of subclinical hyperthyroidism: TSH is barely minimal, chart review shows that those lowest levels are since April 2014. At last visit, discussed Endocrinology ref which pt wants to hold off in this visit.     6-Vit D deficiency. Was replaced with vit D 50, 000 units po qwk x 12 wk then 2000 units qd    PLAN: Follow up in 6/15/2017    MEDICATION CHANGES: as above      Orders Placed This Encounter   Procedures     MR Brain w/o & w Contrast     MR Brain and Orbits     ALT     AST     Albumin level     CBC with platelets differential     Creatinine     CRP inflammation     Erythrocyte sedimentation rate auto     Routine UA with Micro Reflex to Culture     Protein  random urine     Creatinine random urine     Antineutrophil cytoplasmic Marline IgG     Vasculitis panel     OPHTHALMOLOGY ADULT REFERRAL         Majo Mckinnon MD

## 2017-05-09 ENCOUNTER — OFFICE VISIT (OUTPATIENT)
Dept: OPHTHALMOLOGY | Facility: CLINIC | Age: 52
End: 2017-05-09
Attending: OPHTHALMOLOGY
Payer: COMMERCIAL

## 2017-05-09 ENCOUNTER — HOSPITAL ENCOUNTER (OUTPATIENT)
Dept: MRI IMAGING | Facility: CLINIC | Age: 52
Discharge: HOME | End: 2017-05-09
Attending: INTERNAL MEDICINE | Admitting: INTERNAL MEDICINE
Payer: COMMERCIAL

## 2017-05-09 DIAGNOSIS — H05.10 IDIOPATHIC ORBITAL INFLAMMATORY SYNDROME: Primary | ICD-10-CM

## 2017-05-09 DIAGNOSIS — H53.10 SUBJECTIVE VISUAL DISTURBANCE: Primary | ICD-10-CM

## 2017-05-09 DIAGNOSIS — H53.10 SUBJECTIVE VISUAL DISTURBANCE: ICD-10-CM

## 2017-05-09 DIAGNOSIS — I77.82 ANCA-ASSOCIATED VASCULITIS (H): ICD-10-CM

## 2017-05-09 PROCEDURE — 99213 OFFICE O/P EST LOW 20 MIN: CPT | Mod: ZF

## 2017-05-09 PROCEDURE — 92133 CPTRZD OPH DX IMG PST SGM ON: CPT | Mod: ZF | Performed by: OPHTHALMOLOGY

## 2017-05-09 RX ORDER — GADOBUTROL 604.72 MG/ML
7.5 INJECTION INTRAVENOUS ONCE
Status: COMPLETED | OUTPATIENT
Start: 2017-05-09 | End: 2017-05-09

## 2017-05-09 RX ADMIN — GADOBUTROL 7.5 ML: 604.72 INJECTION INTRAVENOUS at 16:58

## 2017-05-09 ASSESSMENT — VISUAL ACUITY
OS_CC: 20/30
METHOD: SNELLEN - LINEAR
OD_CC: 20/30
CORRECTION_TYPE: GLASSES
OD_CC+: -1

## 2017-05-09 ASSESSMENT — TONOMETRY
IOP_METHOD: TONOPEN
OS_IOP_MMHG: 19
OD_IOP_MMHG: 22

## 2017-05-09 ASSESSMENT — SLIT LAMP EXAM - LIDS
COMMENTS: NORMAL
COMMENTS: NORMAL

## 2017-05-09 ASSESSMENT — REFRACTION_WEARINGRX
OD_AXIS: 167
OD_CYLINDER: +1.00
OD_SPHERE: -3.75
OS_AXIS: 008
OS_CYLINDER: +1.00
OS_SPHERE: -3.75

## 2017-05-09 ASSESSMENT — CUP TO DISC RATIO
OS_RATIO: 0.2
OD_RATIO: 0.2

## 2017-05-09 ASSESSMENT — ENCOUNTER SYMPTOMS: JOINT SWELLING: 1

## 2017-05-09 ASSESSMENT — EXTERNAL EXAM - LEFT EYE: OS_EXAM: NORMAL

## 2017-05-09 ASSESSMENT — CONF VISUAL FIELD
OD_NORMAL: 1
OS_NORMAL: 1

## 2017-05-09 NOTE — LETTER
Patient:  Janine Cornell  :   1965  MRN:     [id number]        Ms.Lisa CHELLE Cornell  [address] [facility] [id number]        May 15, 2017    Dear ,    We are writing to inform you of your test results. I read Dr. Vernon's note. Agree with repeat biopsy of inflammatory mass around eye and staining it for IgG-4, although still highly suspect ANCA vasculitis causing inflammation.      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     Comparison: MRI brain 2013     Technique:   Orbits: Axial and coronal T1-weighted, and coronal T2-weighted images  obtained without intravenous contrast. Post-intravenous contrast  (using gadolinium) sagittal FLAIR, were obtained with fat-saturation,  focused on the orbits.  Brain: Axial susceptibility-weighted and FLAIR sequences were obtained  of the whole brain without intravenous contrast, and postcontrast  axial T1-weighted images were obtained through the whole brain.   Contrast: 7.5mL Gadavist    Findings:  New asymmetric enlargement of the right lacrimal gland and enhancement  of the right lacrimal gland with surrounding ill-defined crescentic T2  hyperintense signal and enhancement within the right superior  superotemporal orbit, predominantly involving the extraconal space  with slight intraconal extension. No definite associated restricted  diffusion. No discrete extraocular muscle enlargement. Normal  symmetric optic nerve signal. Cavernous sinuses and orbital apices are  normal. Globes are normal.    Whole brain images are symmetric minimal leukoaraiosis and generalized  parenchymal volume loss. Ventricles are not enlarged. No intracranial  hemorrhage, mass effect, midline shift or  abnormal extra axial fluid  collection. No abnormal foci of intracranial enhancement or restricted  diffusion. Paranasal sinuses and mastoid air cells are clear.        Impression    Impression:    New abnormal enlargement of the right lacrimal gland with surrounding  ill-defined T2 hyperintense signal and enhancement centered in the  superotemporal right orbital fat, which may represent   infectious/inflammatory dacryadenitis, orbital pseudotumor, lymphoma,  carcinoma, sarcoidosis or IgG4 disease..    I have personally reviewed the examination and initial interpretation  and I agree with the findings.    MD Majo MTZ MD

## 2017-05-09 NOTE — MR AVS SNAPSHOT
After Visit Summary   5/9/2017    Janine Cornell    MRN: [id number]           Patient Information     Date Of Birth          1965        Visit Information        Provider Department      5/9/2017 9:00 AM Jas Vernon MD [facility]        Today's Diagnoses     Idiopathic orbital inflammatory syndrome    -  1    ANCA-associated vasculitis (H)        Subjective visual disturbance           Follow-ups after your visit        Your next 10 appointments already scheduled     May 09, 2017  3:15 PM CDT   MR BRAIN AND ORBITS with URMR1   [facility], [place], MRI ([facility])    [facility] [address] [phone]           Take your medicines as usual, unless your doctor tells you not to. Bring a list of your current medicines to your exam (including vitamins, minerals and over-the-counter drugs). Also bring the results of similar scans you may have had.  Please remove any body piercings and hair extensions before you arrive.  Follow your doctor s orders. If you do not, we may have to postpone your exam.  You will not have contrast for this exam. You do not need to do anything special to prepare.  The MRI machine uses a strong magnet. Please wear clothes without metal (snaps, zippers). A sweatsuit works well, or we may give you a hospital gown.   **IMPORTANT** THE INSTRUCTIONS BELOW ARE ONLY FOR THOSE PATIENTS WHO HAVE BEEN TOLD THEY WILL RECEIVE SEDATION OR GENERAL ANESTHESIA DURING THEIR MRI PROCEDURE:  IF YOU WILL RECEIVE SEDATION (take medicine to help you relax during your exam):   You must get the medicine from your doctor before you arrive. Bring the medicine to the exam. Do not take it at home.   Arrive one hour early. Bring someone who can take you home after the test. Your medicine will make you sleepy. After the exam, you may not drive, take a bus or take a taxi by yourself.   No eating 8 hours before your exam. You may  have clear liquids up until 4 hours before your exam. (Clear liquids include water, clear tea, black coffee and fruit juice without pulp.)  IF YOU WILL RECEIVE ANESTHESIA (be asleep for your exam):   Arrive 1 1/2 hours early. Bring someone who can take you home after the test. You may not drive, take a bus or take a taxi by yourself.   No eating 8 hours before your exam. You may have clear liquids up until 4 hours before your exam. (Clear liquids include water, clear tea, black coffee and fruit juice without pulp.)   You will spend four to five hours in the recovery room.  Please call the Imaging Department at your exam site with any questions.            Jun 01, 2017  3:05 PM CDT   (Arrive by 2:50 PM)   Return Visit with Kash Solano MD   [facility] ([facility])    [address] [place] [address]  4th [facility] [address] [phone]            Cheo 15, 2017 10:30 AM CDT   (Arrive by 10:15 AM)   Return Visit with Majo Mckinnon MD   [facility] ([facility])    [address] [place] [address] [phone]            Jun 28, 2017  4:00 PM CDT   Lab with  LAB   [facility] ([facility])    [address] [place] [id number] [phone]            Jun 28, 2017  4:30 PM CDT   (Arrive by 4:15 PM)   Return Visit with Milan Peace MD   [facility] ([facility])    [address] [place] [id number] [phone]              Future tests that were ordered for you today     Open Future Orders        Priority Expected Expires Ordered    IOP Measurement Routine  11/10/2018 5/9/2017    Color Vision - Screening OU (both eyes) Routine  11/10/2018 5/9/2017    Glaucoma Top OU Routine  11/10/2018 5/9/2017            Who to contact     Please call your clinic at [phone] to:    Ask questions about  your health    Make or cancel appointments    Discuss your medicines    Learn about your test results    Speak to your doctor   If you have compliments or concerns about an experience at your clinic, or if you wish to file a complaint, please contact [facility] Patient Relations at [phone] or email us at [email or web address]         Additional Information About Your Visit        MyChart Information     [employer] gives you secure access to your electronic health record. If you see a primary care provider, you can also send messages to your care team and make appointments. If you have questions, please call your primary care clinic.  If you do not have a primary care provider, please call [phone] and they will assist you.      [employer] is an electronic gateway that provides easy, online access to your medical records. With [employer], you can request a clinic appointment, read your test results, renew a prescription or communicate with your care team.     To access your existing account, please contact your [facility] or call [phone] for assistance.        Care EveryWhere ID     This is your Care EveryWhere ID. This could be used by other organizations to access your [place] medical records  [id number]         Blood Pressure from Last 3 Encounters:   05/05/17 109/72   04/26/17 97/64   04/07/17 101/60    Weight from Last 3 Encounters:   05/05/17 76.4 kg (168 lb 6.4 oz)   04/26/17 77.6 kg (171 lb)   04/07/17 76.2 kg (168 lb)              We Performed the Following     OCT Optic Nerve RNFL Spectralis OU (both eyes)          Today's Medication Changes          These changes are accurate as of: 5/9/17  1:43 PM.  If you have any questions, ask your nurse or doctor.               These medicines have changed or have updated prescriptions.        Dose/Directions    vitamin D 19547 UNIT capsule   Commonly known as:  ERGOCALCIFEROL   This may have  changed:  additional instructions   Used for:  Vitamin D deficiency        Dose:  98314 Units   Take 1 capsule (50,000 Units) by mouth every 7 days Need a Vitamin D level in 2 months.   Quantity:  8 capsule   Refills:  0                Primary Care Provider Office Phone # Fax #    Kash Solano [phone]       [facility] [address] [place] [address] 88  [facility] [address]        Thank you!     Thank you for choosing EYE CLINIC  for your care. Our goal is always to provide you with excellent care. Hearing back from our patients is one way we can continue to improve our services. Please take a few minutes to complete the written survey that you may receive in the mail after your visit with us. Thank you!             Your Updated Medication List - Protect others around you: Learn how to safely use, store and throw away your medicines at www.disposemymeds.org.          This list is accurate as of: 5/9/17  1:43 PM.  Always use your most recent med list.                   Brand Name Dispense Instructions for use    acetaminophen 325 MG tablet    TYLENOL    250 tablet    Take 1-2 tablets (325-650 mg) by mouth every 6 hours as needed for pain (headache)       AEROSPAN 80 MCG/ACT Aers   Generic drug:  Flunisolide HFA      INHALE 2 PUFFS PO BID.  RINSE MOUTH AFTERWARDS       * albuterol 108 (90 BASE) MCG/ACT Inhaler    PROAIR HFA/PROVENTIL HFA/VENTOLIN HFA    3 Inhaler    Inhale 2 puffs into the lungs every 6 hours as needed for shortness of breath / dyspnea or wheezing       * albuterol (2.5 MG/3ML) 0.083% neb solution      INL 1 VIAL VIA NEBULIZATION Q 4 TO 6 HOURS PRN       ASPIRIN LOW DOSE 81 MG EC tablet   Generic drug:  aspirin     90 tablet    TAKE 1 TABLET BY MOUTH EVERY DAY       azaTHIOprine 50 MG tablet    IMURAN    30 tablet    Take 1 tablet (50 mg) by mouth daily TAKE BY MOUTH WITH FOOD.       azelastine 0.1 % spray    ASTELIN    1 Bottle    Spray 2 sprays into both nostrils 2 times  daily       blood glucose monitoring lancets     4 Box    Use to test blood sugars 4 times daily or as directed.       blood glucose monitoring meter device kit    no brand specified    1 kit    Use to test blood sugar 4 X times daily or as directed.       blood glucose monitoring test strip    no brand specified    360 each    1 strip by In Vitro route 4 times daily Test as directed. Please dispense three months and three months of refills. Thank you.       clopidogrel 75 MG tablet    PLAVIX    90 tablet    Take 1 tablet (75 mg) by mouth daily       cyclobenzaprine 10 MG tablet    FLEXERIL    180 tablet    Take 1 tablet (10 mg) by mouth 2 times daily as needed for muscle spasms       desonide 0.05 % cream    DESOWEN     Apply topically 2 times daily       dicyclomine 20 MG tablet    BENTYL    60 tablet    Take 1 tablet (20 mg) by mouth 4 times daily as needed       diphenhydrAMINE 25 MG capsule    BENADRYL     TK 1 TO 2 CS PO QHS       EPIPEN 2-RIKY 0.3 MG/0.3ML injection   Generic drug:  EPINEPHrine      INJECT 0.3 MG INTO THE MUSCLE PRF ANAPHYALAXIS       ESOMEPRAZOLE MAGNESIUM PO      Take 20 mg by mouth 2 times daily (before meals)       ferrous gluconate 324 (38 FE) MG tablet    FERGON    180 tablet    Take 1 tablet (324 mg) by mouth 2 times daily       fexofenadine 180 MG tablet    ALLEGRA    90 tablet    Take 1 tablet by mouth daily as needed.       fluconazole 100 MG tablet    DIFLUCAN     Take 100 mg by mouth daily as needed       fluocinolone 0.01 % solution    SYNALAR     Apply topically daily as needed       fluocinonide 0.05 % solution    LIDEX    60 mL    Apply topically 2 times daily To areas of itch on the scalp as needed.       fluticasone 50 MCG/ACT spray    FLONASE     U 2 SPRAYS IEN D.       hydrALAZINE 25 MG tablet    APRESOLINE    90 tablet    Take 0.5 tablets (12.5 mg) by mouth daily . May take additional one-half tablet once daily if systolic blood pressure >140 mmHg.       hydroxychloroquine  200 MG tablet    PLAQUENIL    180 tablet    Take 2 tablets (400 mg) by mouth daily EYE EXAM DUE/LETTER SENT       insulin glargine 100 UNIT/ML injection     9 mL    Inject 40 Units Subcutaneous daily       insulin pen needle 32G X 4 MM     300 each    Use 1 daily as directed.       ketoconazole 2 % shampoo    NIZORAL     Apply topically daily as needed       Ketoprofen Powd     100 g    Apply 1 g topically 2 times daily as needed       lidocaine 5 % ointment    XYLOCAINE    50 g    Apply 1 g topically 2 times daily as needed for moderate pain       lisinopril-hydrochlorothiazide 20-25 MG per tablet    PRINZIDE/ZESTORETIC    90 tablet    TAKE 1 TABLET BY MOUTH DAILY       Magnesium Oxide -Mg Supplement 250 MG Tabs      TK 1 T PO BID. REDUCE IF STOOLS LOOSEN       metFORMIN 500 MG 24 hr tablet    GLUCOPHAGE-XR    60 tablet    Take 2 tablets (1,000 mg) by mouth daily (with dinner)       metoprolol 100 MG 24 hr tablet    TOPROL-XL    90 tablet    Take 1 tablet (100 mg) by mouth daily       metroNIDAZOLE 1 % cream    NORITATE     Apply topically daily       mometasone 50 MCG/ACT spray    NASONEX     Spray 2 sprays into both nostrils daily       montelukast 10 MG tablet    SINGULAIR    30 tablet    Take 1 tablet (10 mg) by mouth At Bedtime       nitroglycerin 0.4 MG sublingual tablet    NITROSTAT    25 tablet    Place 1 tablet (0.4 mg) under the tongue every 5 minutes as needed for chest pain       nystatin 320793 UNITS Tabs tablet    MYCOSTATIN     TK 2 TS PO BID       olopatadine 0.1 % ophthalmic solution    PATANOL    5 mL    Place 1 drop into both eyes 2 times daily as needed for allergies.       pravastatin 40 MG tablet    PRAVACHOL    30 tablet    Take 1 tablet (40 mg) by mouth daily       predniSONE 10 MG tablet    DELTASONE    60 tablet    40-30-20-10 mg a day each for 3 days then stop       ranitidine 150 MG tablet    ZANTAC    60 tablet    Take 1 tablet (150 mg) by mouth 2 times daily       spironolactone 25 MG  tablet    ALDACTONE    60 tablet    Take 2 tablets (50 mg) by mouth daily       * sucralfate 1 GM tablet    CARAFATE    40 tablet    Take 1 tablet (1 g) by mouth 4 times daily as needed       * sucralfate 1 GM tablet    CARAFATE    120 tablet    Take 1 tablet (1 g) by mouth 4 times daily as needed       traMADol 50 MG tablet    ULTRAM    60 tablet    Take 1 tablet (50 mg) by mouth every 8 hours as needed for moderate pain       TUMS 500 MG chewable tablet   Generic drug:  calcium carbonate      Take 3-4 chew tab by mouth daily as needed.       vitamin D 74013 UNIT capsule    ERGOCALCIFEROL    8 capsule    Take 1 capsule (50,000 Units) by mouth every 7 days Need a Vitamin D level in 2 months.       ZOFRAN PO          * Notice:  This list has 4 medication(s) that are the same as other medications prescribed for you. Read the directions carefully, and ask your doctor or other care provider to review them with you.

## 2017-05-09 NOTE — NURSING NOTE
Chief Complaints and History of Present Illnesses   Patient presents with     Consult For     Orbital pseudotumor     HPI    Affected eye(s):  Both   Symptoms:     Blurred vision   Distorted vision   Floaters   No flashes   Photophobia (Comment: BE)         Do you have eye pain now?:  Yes   Location:  OD   Pain Level:  Severe Pain (7)   Pain Duration:  1 year   Pain Characteristics:  Stabbing, Aching, Excruciating      Comments:  Pressure, pulling, numbness, twisted feeling  on/off X 1 year  Headaches daily  Blood sugar not checked daily due to new machine   A1C around 7.9   Meghna Allred COA 9:19 AM May 9, 2017

## 2017-05-09 NOTE — LETTER
2017         RE:  :  MRN: Janine Cornell  1965  [id number]     Dear Dr. Mckinnon,    Thank you for asking me to see your very pleasant patient, Janine Cornell, in neuro-ophthalmic consultation.  I would like to thank you for sending your records and I have summarized them in the history of present illness.   My assessment and plan are below.  For further details, please see my attached clinic note.      Assessment & Plan     Janine Cornell is a 51 year old female with the following diagnoses:   1. Idiopathic orbital inflammatory syndrome    2. ANCA-associated vasculitis (H)    3. Subjective visual disturbance         Janine Cornell is a 51 year old female with rheumatoid arthritis that has had persistent flares of right superior orbital/lacrimal gland inflammation since winter of 2016. Previous biopsy 2016 of the lacrimal gland showed inflammation with microabscesses, was reported as negative for malignancy. Report states that it was sent for flow cytometry and but no documentation of results noted. In clinic today she has right-sided proptosis, lacrimal gland tenderness, and superior RNFL thinning associated with an inferior arcuate field defect of the right eye. Possibility of orbital pseudotumor, but also concerning for possible lymphoma. Scheduled for repeat MRI this afternoon; depending on findings may recommend repeat biopsy with flow cytometry and cytology.  Will also ask for films from [facility] to see if lesion has changed at all.      Again, thank you for allowing me to participate in the care of your patient.      Sincerely,    Jas Vernon MD  Professor, Neuro-Ophthalmology  Department of Ophthalmology and Visual Neurosciences  [facility]    CC: Majo Mckinnon MD  VIA In Basket     Kash Solano MD  VIA In Basket     Milan Peace MD  VIA In Basket     Lauren Francis RN  VIA In Basket     Jolene Duenas MD  [facility]  [address] [place]  [address]  [facility] [address]  VIA Facsimile: [phone]     Manny Chowdhury MD  VIA In Basket       DX = idiopathic orbital inflammatory syndrome

## 2017-05-09 NOTE — PROGRESS NOTES
Assessment & Plan     Janine Cornell is a 51 year old female with the following diagnoses:   1. Idiopathic orbital inflammatory syndrome    2. ANCA-associated vasculitis (H)    3. Subjective visual disturbance         Janine Cornell is a 51 year old female with rheumatoid arthritis that has had persistent flares of right superior orbital/lacrimal gland inflammation since winter of 2016. Previous biopsy March 2016 of the lacrimal gland showed inflammation with microabscesses, was reported as negative for malignancy. Report states that it was sent for flow cytometry and but no documentation of results noted. In clinic today she has right-sided proptosis, lacrimal gland tenderness, and superior RNFL thinning associated with an inferior arcuate field defect of the right eye. Possibility of orbital pseudotumor, but also concerning for possible lymphoma. Scheduled for repeat MRI this afternoon; depending on findings may recommend repeat biopsy with flow cytometry and cytology.  Will also ask for films from [facility] to see if lesion has changed at all.           Attending Physician Attestation:  Complete documentation of historical and exam elements from today's encounter can be found in the full encounter summary report (not reduplicated in this progress note).  I personally obtained the chief complaint(s) and history of present illness.  I confirmed and edited as necessary the review of systems, past medical/surgical history, family history, social history, and examination findings as documented by others; and I examined the patient myself.  I personally reviewed the relevant tests, images, and reports as documented above.  I formulated and edited as necessary the assessment and plan and discussed the findings and management plan with the patient and family. - Jas Vernon MD 1:41 PM 5/9/2017

## 2017-05-12 ENCOUNTER — TELEPHONE (OUTPATIENT)
Dept: RHEUMATOLOGY | Facility: CLINIC | Age: 52
End: 2017-05-12

## 2017-05-12 NOTE — TELEPHONE ENCOUNTER
Pt called as she had gotten 4 different prescriptions for prednisone last week.  Pt got the taper that her and Dr. Mckinnon had discussed from the pharmacy here, which is the one that she should be taking and then she also got several more that were refilled and then cancelled at [facility].  Discussed that Dr. Mckinnon had cancelled them, and so I am not sure how they still got authorized to pick them up. Discussed which prednisone dosing pt should be taking and she was appreciative.     Desiree Godinez RN  Rheumatology Clinic

## 2017-05-12 NOTE — TELEPHONE ENCOUNTER
----- Message from Damaris Rudolph LPN sent at 5/11/2017  2:48 PM CDT -----  Regarding: FW: Dr Mckinnon/Desiree Almazan pt req a call back re: Prednisone   Contact: [phone]  Patient wants you to call her back. Did not want to speak with me.      ----- Message -----     From: Damaris Rudolph LPN     Sent: 5/11/2017   9:04 AM       To: Damaris Rudolph LPN  Subject: FW: Dr Mckinnon/Desiree Almazan pt req a call back #        ----- Message -----     From: Maria Del Carmen Cantrell     Sent: 5/10/2017   4:41 PM       To: Adult Rheum Triage-  Subject: Dr Mckinnon/Desiree Almazan pt req a call back re: #    Pt is req a call back, she was in clinic Friday and Dr Mckinnon prescribed prednisone and another medication. Pred[facility]one was [facility] different prescriptions for prednisone with different dosages. She is not sure why she needs so many and is req a call at [phone] thanks  Maria Del Carmen - Adult call center  Please DO NOT send this message and/or reply back to sender.  Call Center Representatives DO NOT respond to messages.

## 2017-05-15 NOTE — PROGRESS NOTES
I read Dr. Vernon's note. Agree with repeat biopsy of inflammatory mass around eye and staining it for IgG-4, although still highly suspect ANCA vasculitis causing inflammation.

## 2017-05-16 NOTE — PROGRESS NOTES
SUBJECTIVE:    Pt is a 51 year old female with pmh of     Patient Active Problem List   Diagnosis     Seasonal affective disorder (H)     Allergic rhinitis     PCOS (polycystic ovarian syndrome)     Moderate persistent asthma     Chronic pancreatitis (H)     Hypertension goal BP (blood pressure) < 140/90     Common migraine     NSTEMI (non-ST elevated myocardial infarction) (H)     Hyperlipidemia LDL goal <70     ASCVD (arteriosclerotic cardiovascular disease)     GERD (gastroesophageal reflux disease)     Ischemic cardiomyopathy     Hypertensive heart disease     Uterine leiomyoma     Iron deficiency anemia     Costochondritis     Vitamin D deficiency     Breast cancer screening     Spinal stenosis in cervical region     Fibromyalgia     Seronegative rheumatoid arthritis (H)     Type 2 diabetes, HbA1C goal < 8% (H)     Type 2 diabetes mellitus with other specified complication (H)     Hyperlipidemia associated with type 2 diabetes mellitus (H)     Hypertension associated with diabetes (H)     Overweight with body mass index (BMI) 25.0-29.9     Tobacco use disorder     Telogen effluvium     CAD S/P percutaneous coronary angioplasty     Status post coronary angiogram       who is here for evaluation of had concerns including Headache.    Here in f/u.   Fibromyalgia: discussed PT options [facility][ pool, she could get to [facility] to try    DM: due for lab    Has upcoming rheum appt to discuss meds she has many questions about which meds to continue on    Allergies   Allergen Reactions     Amoxicillin-Pot Clavulanate      Augmentin      Unknown possible hives and edema     Azithromycin      Diatrizoate Other (See Comments)     Pt wants this listed because she is allergic to shellfish      Imitrex [Sumatriptan]      Severe face/neck/chest tightness and flushing side effects      Penicillins Hives     Unknown      Pork Allergy      Stomach pain, cramping, diarrhea, itching, nausea and headaches     Shellfish Allergy  Hives and Swelling     Sulfa Drugs Hives and Swelling     Zithromax [Azithromycin Dihydrate] Swelling     Unknown                Current Outpatient Prescriptions   Medication Sig Dispense Refill     azaTHIOprine (IMURAN) 50 MG tablet Take 1 tablet (50 mg) by mouth daily TAKE BY MOUTH WITH FOOD. 30 tablet 2     predniSONE (DELTASONE) 10 MG tablet 40-30-20-10 mg a day each for 3 days then stop 60 tablet 1     lidocaine (XYLOCAINE) 5 % ointment Apply 1 g topically 2 times daily as needed for moderate pain 50 g 5     Ketoprofen POWD Apply 1 g topically 2 times daily as needed 100 g 5     hydrALAZINE (APRESOLINE) 25 MG tablet Take 0.5 tablets (12.5 mg) by mouth daily . May take additional one-half tablet once daily if systolic blood pressure >140 mmHg. 90 tablet 3     traMADol (ULTRAM) 50 MG tablet Take 1 tablet (50 mg) by mouth every 8 hours as needed for moderate pain 60 tablet 3     fluconazole (DIFLUCAN) 100 MG tablet Take 100 mg by mouth daily as needed       hydroxychloroquine (PLAQUENIL) 200 MG tablet Take 2 tablets (400 mg) by mouth daily EYE EXAM DUE/LETTER SENT 180 tablet 0     insulin glargine (BASAGLAR KWIKPEN) 100 UNIT/ML injection Inject 40 Units Subcutaneous daily 9 mL 3     ranitidine (ZANTAC) 150 MG tablet Take 1 tablet (150 mg) by mouth 2 times daily 60 tablet 2     ferrous gluconate (FERGON) 324 (38 FE) MG tablet Take 1 tablet (324 mg) by mouth 2 times daily 180 tablet 1     ASPIRIN LOW DOSE 81 MG EC tablet TAKE 1 TABLET BY MOUTH EVERY DAY 90 tablet 3     blood glucose monitoring (ONE TOUCH DELICA) lancets Use to test blood sugars 4 times daily or as directed. 4 Box 3     vitamin D (ERGOCALCIFEROL) 05127 UNIT capsule Take 1 capsule (50,000 Units) by mouth every 7 days Need a Vitamin D level in 2 months. (Patient taking differently: Take 50,000 Units by mouth every 7 days Need a Vitamin D level in 2 months.) 8 capsule 0     metoprolol (TOPROL-XL) 100 MG 24 hr tablet Take 1 tablet (100 mg) by mouth daily  90 tablet 3     clopidogrel (PLAVIX) 75 MG tablet Take 1 tablet (75 mg) by mouth daily 90 tablet 3     lisinopril-hydrochlorothiazide (PRINZIDE/ZESTORETIC) 20-25 MG per tablet TAKE 1 TABLET BY MOUTH DAILY 90 tablet 3     blood glucose monitoring (NO BRAND SPECIFIED) meter device kit Use to test blood sugar 4 X times daily or as directed. 1 kit 0     blood glucose monitoring (NO BRAND SPECIFIED) test strip 1 strip by In Vitro route 4 times daily Test as directed. Please dispense three months and three months of refills. Thank you. 360 each 3     diphenhydrAMINE (BENADRYL) 25 MG capsule TK 1 TO 2 CS PO QHS  4     EPIPEN 2-RIKY 0.3 MG/0.3ML injection INJECT 0.3 MG INTO THE MUSCLE PRF ANAPHYALAXIS  0     AEROSPAN 80 MCG/ACT AERS INHALE 2 PUFFS PO BID.  RINSE MOUTH AFTERWARDS  4     fluticasone (FLONASE) 50 MCG/ACT spray U 2 SPRAYS IEN D.  3     Magnesium Oxide -Mg Supplement 250 MG TABS TK 1 T PO BID. REDUCE IF STOOLS LOOSEN  11     nystatin (MYCOSTATIN) 642861 UNITS TABS tablet TK 2 TS PO BID  0     albuterol (2.5 MG/3ML) 0.083% neb solution INL 1 VIAL VIA NEBULIZATION Q 4 TO 6 HOURS PRN  1     dicyclomine (BENTYL) 20 MG tablet Take 1 tablet (20 mg) by mouth 4 times daily as needed 60 tablet 5     sucralfate (CARAFATE) 1 GM tablet Take 1 tablet (1 g) by mouth 4 times daily as needed 120 tablet 3     azelastine (ASTELIN) 0.1 % spray Spray 2 sprays into both nostrils 2 times daily 1 Bottle 3     fluocinolone (SYNALAR) 0.01 % solution Apply topically daily as needed       mometasone (NASONEX) 50 MCG/ACT spray Spray 2 sprays into both nostrils daily       cyclobenzaprine (FLEXERIL) 10 MG tablet Take 1 tablet (10 mg) by mouth 2 times daily as needed for muscle spasms 180 tablet 0     Ondansetron HCl (ZOFRAN PO)        pravastatin (PRAVACHOL) 40 MG tablet Take 1 tablet (40 mg) by mouth daily 30 tablet 11     spironolactone (ALDACTONE) 25 MG tablet Take 2 tablets (50 mg) by mouth daily 60 tablet 11     metFORMIN  (GLUCOPHAGE-XR) 500 MG 24 hr tablet Take 2 tablets (1,000 mg) by mouth daily (with dinner) 60 tablet 11     montelukast (SINGULAIR) 10 MG tablet Take 1 tablet (10 mg) by mouth At Bedtime 30 tablet 1     ESOMEPRAZOLE MAGNESIUM PO Take 20 mg by mouth 2 times daily (before meals)       insulin pen needle 32G X 4 MM Use 1 daily as directed. 300 each 3     acetaminophen (TYLENOL) 325 MG tablet Take 1-2 tablets (325-650 mg) by mouth every 6 hours as needed for pain (headache) 250 tablet 0     metroNIDAZOLE (NORITATE) 1 % cream Apply topically daily       desonide (DESOWEN) 0.05 % cream Apply topically 2 times daily       ketoconazole (NIZORAL) 2 % shampoo Apply topically daily as needed       fluocinonide (LIDEX) 0.05 % external solution Apply topically 2 times daily To areas of itch on the scalp as needed. 60 mL 11     sucralfate (CARAFATE) 1 GM tablet Take 1 tablet (1 g) by mouth 4 times daily as needed 40 tablet 1     nitroglycerin (NITROSTAT) 0.4 MG SL tablet Place 1 tablet (0.4 mg) under the tongue every 5 minutes as needed for chest pain 25 tablet 1     albuterol (PROAIR HFA, PROVENTIL HFA, VENTOLIN HFA) 108 (90 BASE) MCG/ACT inhaler Inhale 2 puffs into the lungs every 6 hours as needed for shortness of breath / dyspnea or wheezing 3 Inhaler 1     calcium carbonate (TUMS) 500 MG chewable tablet Take 3-4 chew tab by mouth daily as needed.       fexofenadine (ALLEGRA) 180 MG tablet Take 1 tablet by mouth daily as needed. 90 tablet 3     olopatadine (PATANOL) 0.1 % ophthalmic solution Place 1 drop into both eyes 2 times daily as needed for allergies. 5 mL 12       Social History   Substance Use Topics     Smoking status: Former Smoker     Packs/day: 0.20     Years: 1.00     Types: Cigarettes     Quit date: 2/1/2016     Smokeless tobacco: Never Used     Alcohol use No           OBJECTIVE:  BP 97/64  Pulse 78  Temp 98.3  F (36.8  C)  Resp 16  Wt 77.6 kg (171 lb)  BMI 30.29 kg/m2  GENERAL APPEARANCE: Alert, no acute  distress  RESP: lungs clear to auscultation   CV: normal rate, regular rhythm, no murmur or gallop  NEURO: Alert, oriented, speech and mentation normal  PSYCHE: mentation appears normal, affect and mood normal    ASSESSMENT/PLAN:    DM: labs now  FM: [facility]    WENDY HARRISON MD

## 2017-05-19 NOTE — PROGRESS NOTES
Normal TPMT means that it's ok to try imuran but I recommend you to do follow up with dr. Vernon and have biopsy repeated (if he still recommends it) before start of imuran.

## 2017-05-30 DIAGNOSIS — H04.009: Primary | ICD-10-CM

## 2017-05-31 DIAGNOSIS — E11.9 TYPE 2 DIABETES MELLITUS WITHOUT COMPLICATION (H): ICD-10-CM

## 2017-05-31 DIAGNOSIS — K29.70 GASTRITIS: ICD-10-CM

## 2017-05-31 DIAGNOSIS — E11.9 TYPE 2 DIABETES, HBA1C GOAL < 7% (H): ICD-10-CM

## 2017-06-01 DIAGNOSIS — I77.82 ANCA-ASSOCIATED VASCULITIS (H): ICD-10-CM

## 2017-06-01 LAB
ALBUMIN UR-MCNC: NEGATIVE MG/DL
APPEARANCE UR: CLEAR
BACTERIA #/AREA URNS HPF: ABNORMAL /HPF
BASOPHILS # BLD AUTO: 0 10E9/L (ref 0–0.2)
BASOPHILS NFR BLD AUTO: 0.3 %
BILIRUB UR QL STRIP: NEGATIVE
COLOR UR AUTO: YELLOW
DIFFERENTIAL METHOD BLD: ABNORMAL
EOSINOPHIL # BLD AUTO: 0.2 10E9/L (ref 0–0.7)
EOSINOPHIL NFR BLD AUTO: 1.9 %
ERYTHROCYTE [DISTWIDTH] IN BLOOD BY AUTOMATED COUNT: 14.8 % (ref 10–15)
ERYTHROCYTE [SEDIMENTATION RATE] IN BLOOD BY WESTERGREN METHOD: 13 MM/H (ref 0–30)
GLUCOSE UR STRIP-MCNC: NEGATIVE MG/DL
HCT VFR BLD AUTO: 41 % (ref 35–47)
HGB BLD-MCNC: 13.8 G/DL (ref 11.7–15.7)
HGB UR QL STRIP: NEGATIVE
KETONES UR STRIP-MCNC: NEGATIVE MG/DL
LEUKOCYTE ESTERASE UR QL STRIP: NEGATIVE
LYMPHOCYTES # BLD AUTO: 2 10E9/L (ref 0.8–5.3)
LYMPHOCYTES NFR BLD AUTO: 16.5 %
MCH RBC QN AUTO: 26.6 PG (ref 26.5–33)
MCHC RBC AUTO-ENTMCNC: 33.7 G/DL (ref 31.5–36.5)
MCV RBC AUTO: 79 FL (ref 78–100)
MONOCYTES # BLD AUTO: 0.6 10E9/L (ref 0–1.3)
MONOCYTES NFR BLD AUTO: 4.6 %
NEUTROPHILS # BLD AUTO: 9.2 10E9/L (ref 1.6–8.3)
NEUTROPHILS NFR BLD AUTO: 76.7 %
NITRATE UR QL: NEGATIVE
NON-SQ EPI CELLS #/AREA URNS LPF: ABNORMAL /LPF
PH UR STRIP: 5.5 PH (ref 5–7)
PLATELET # BLD AUTO: 322 10E9/L (ref 150–450)
RBC # BLD AUTO: 5.18 10E12/L (ref 3.8–5.2)
RBC #/AREA URNS AUTO: ABNORMAL /HPF (ref 0–2)
SP GR UR STRIP: 1.01 (ref 1–1.03)
URN SPEC COLLECT METH UR: ABNORMAL
UROBILINOGEN UR STRIP-ACNC: 0.2 EU/DL (ref 0.2–1)
WBC # BLD AUTO: 12 10E9/L (ref 4–11)
WBC #/AREA URNS AUTO: ABNORMAL /HPF (ref 0–2)

## 2017-06-01 PROCEDURE — 36415 COLL VENOUS BLD VENIPUNCTURE: CPT | Performed by: FAMILY MEDICINE

## 2017-06-01 PROCEDURE — 85652 RBC SED RATE AUTOMATED: CPT | Performed by: FAMILY MEDICINE

## 2017-06-01 PROCEDURE — 99000 SPECIMEN HANDLING OFFICE-LAB: CPT | Performed by: FAMILY MEDICINE

## 2017-06-01 PROCEDURE — 82565 ASSAY OF CREATININE: CPT | Performed by: FAMILY MEDICINE

## 2017-06-01 PROCEDURE — 84460 ALANINE AMINO (ALT) (SGPT): CPT | Performed by: FAMILY MEDICINE

## 2017-06-01 PROCEDURE — 86140 C-REACTIVE PROTEIN: CPT | Performed by: FAMILY MEDICINE

## 2017-06-01 PROCEDURE — 85025 COMPLETE CBC W/AUTO DIFF WBC: CPT | Performed by: FAMILY MEDICINE

## 2017-06-01 PROCEDURE — 82040 ASSAY OF SERUM ALBUMIN: CPT | Performed by: FAMILY MEDICINE

## 2017-06-01 PROCEDURE — 81001 URINALYSIS AUTO W/SCOPE: CPT | Performed by: FAMILY MEDICINE

## 2017-06-01 PROCEDURE — 83876 ASSAY MYELOPEROXIDASE: CPT | Performed by: FAMILY MEDICINE

## 2017-06-01 PROCEDURE — 83516 IMMUNOASSAY NONANTIBODY: CPT | Performed by: FAMILY MEDICINE

## 2017-06-01 PROCEDURE — 84156 ASSAY OF PROTEIN URINE: CPT | Performed by: FAMILY MEDICINE

## 2017-06-01 PROCEDURE — 84450 TRANSFERASE (AST) (SGOT): CPT | Performed by: FAMILY MEDICINE

## 2017-06-01 PROCEDURE — 86255 FLUORESCENT ANTIBODY SCREEN: CPT | Mod: 90 | Performed by: FAMILY MEDICINE

## 2017-06-01 RX ORDER — INSULIN GLARGINE 100 [IU]/ML
40 INJECTION, SOLUTION SUBCUTANEOUS DAILY
Qty: 18 ML | Refills: 3 | Status: SHIPPED | OUTPATIENT
Start: 2017-06-01 | End: 2018-07-18

## 2017-06-01 NOTE — TELEPHONE ENCOUNTER
glargine       Last Written Prescription Date: 2/3/17  Last Fill Quantity: 9ML,   # refills: 3  Last Office Visit : 4/26/17  Future Office visit:  NONE  Lab Results   Component Value Date    A1C 7.8 04/26/2017     Lab Results   Component Value Date    MICROL <5 04/26/2017     ranitidine      Last Written Prescription Date:  2/2/17  Last Fill Quantity: 60,   # refills: 2

## 2017-06-01 NOTE — LETTER
Patient:  Janine Cornell  :   1965  MRN:     [id number]        Ms.Lisa CHELLE Cornell  [address] [facility] [address]        2017    Dear ,    We are writing to inform you of your test results. They are stable.      Resulted Orders   ALT   Result Value Ref Range    ALT 29 0 - 50 U/L   AST   Result Value Ref Range    AST 18 0 - 45 U/L   Albumin level   Result Value Ref Range    Albumin 4.6 3.4 - 5.0 g/dL   CBC with platelets differential   Result Value Ref Range    WBC 12.0 (H) 4.0 - 11.0 10e9/L    RBC Count 5.18 3.8 - 5.2 10e12/L    Hemoglobin 13.8 11.7 - 15.7 g/dL    Hematocrit 41.0 35.0 - 47.0 %    MCV 79 78 - 100 fl    MCH 26.6 26.5 - 33.0 pg    MCHC 33.7 31.5 - 36.5 g/dL    RDW 14.8 10.0 - 15.0 %    Platelet Count 322 150 - 450 10e9/L    Diff Method Automated Method     % Neutrophils 76.7 %    % Lymphocytes 16.5 %    % Monocytes 4.6 %    % Eosinophils 1.9 %    % Basophils 0.3 %    Absolute Neutrophil 9.2 (H) 1.6 - 8.3 10e9/L    Absolute Lymphocytes 2.0 0.8 - 5.3 10e9/L    Absolute Monocytes 0.6 0.0 - 1.3 10e9/L    Absolute Eosinophils 0.2 0.0 - 0.7 10e9/L    Absolute Basophils 0.0 0.0 - 0.2 10e9/L   Creatinine   Result Value Ref Range    Creatinine 1.19 (H) 0.52 - 1.04 mg/dL    GFR Estimate 48 (L) >60 mL/min/1.7m2      Comment:      Non  GFR Calc    GFR Estimate If Black 58 (L) >60 mL/min/1.7m2      Comment:       GFR Calc   CRP inflammation   Result Value Ref Range    CRP Inflammation 6.1 0.0 - 8.0 mg/L   Erythrocyte sedimentation rate auto   Result Value Ref Range    Sed Rate 13 0 - 30 mm/h   Protein  random urine   Result Value Ref Range    Protein Random Urine <0.05 g/L    Protein Total Urine g/gr Creatinine Unable to calculate due to low value 0 - 0.2 g/g Cr   Creatinine random urine   Result Value Ref Range    Creatinine Urine Random 54 mg/dL   Antineutrophil cytoplasmic Marline IgG   Result Value Ref Range    Neutrophil Cytoplasmic IgG Antibody        <1:20  Reference range: <1:20  (Note)  The ANCA IFA is <1:20; therefore, no further testing will  be performed.  INTERPRETIVE INFORMATION: Anti-Neutrophil Cyto Ab, IgG  Neutrophil Cytoplasmic Antibodies (C-ANCA = granular  cytoplasmic staining, P-ANCA = perinuclear staining) are  found in the serum of over 90 percent of patients with  certain necrotizing systemic vasculitides, and usually in  less than 5 percent of patients with collagen vascular  disease or arthritis.  Performed by [employer],  [address] [place] [address] [phone]  [email or web address], Ameya Akers MD, Lab. Director     Vasculitis panel   Result Value Ref Range    Myeloperoxidase Antibody IgG 1.9 (H) 0.0 - 0.9 AI      Comment:      Positive   Antibody index (AI) values reflect qualitative changes in antibody   concentration that cannot be directly associated with clinical condition or   disease state.      Proteinase 3 Antibody IgG  0.0 - 0.9 AI     <0.2  Negative   Antibody index (AI) values reflect qualitative changes in antibody   concentration that cannot be directly associated with clinical condition or   disease state.     UA with Microscopic reflex to Culture   Result Value Ref Range    Color Urine Yellow     Appearance Urine Clear     Glucose Urine Negative NEG mg/dL    Bilirubin Urine Negative NEG    Ketones Urine Negative NEG mg/dL    Specific Gravity Urine 1.010 1.003 - 1.035    pH Urine 5.5 5.0 - 7.0 pH    Protein Albumin Urine Negative NEG mg/dL    Urobilinogen Urine 0.2 0.2 - 1.0 EU/dL    Nitrite Urine Negative NEG    Blood Urine Negative NEG    Leukocyte Esterase Urine Negative NEG    Source Midstream Urine     WBC Urine O - 2 0 - 2 /HPF    RBC Urine O - 2 0 - 2 /HPF    Squamous Epithelial /LPF Urine Few FEW /LPF    Bacteria Urine Few (A) NEG /HPF   Creatinine urine calculation only   Result Value Ref Range    Creatinine Urine 54 mg/dL       Majo Mckinnon MD

## 2017-06-02 LAB
ALBUMIN SERPL-MCNC: 4.6 G/DL (ref 3.4–5)
ALT SERPL W P-5'-P-CCNC: 29 U/L (ref 0–50)
AST SERPL W P-5'-P-CCNC: 18 U/L (ref 0–45)
CREAT SERPL-MCNC: 1.19 MG/DL (ref 0.52–1.04)
CREAT UR-MCNC: 54 MG/DL
CREAT UR-MCNC: 54 MG/DL
CRP SERPL-MCNC: 6.1 MG/L (ref 0–8)
GFR SERPL CREATININE-BSD FRML MDRD: 48 ML/MIN/1.7M2
PROT UR-MCNC: <0.05 G/L
PROT/CREAT 24H UR: NORMAL G/G CR (ref 0–0.2)

## 2017-06-04 LAB — ANCA IGG TITR SER IF: NORMAL {TITER}

## 2017-06-05 ENCOUNTER — OFFICE VISIT (OUTPATIENT)
Dept: OPHTHALMOLOGY | Facility: CLINIC | Age: 52
End: 2017-06-05
Attending: OPHTHALMOLOGY
Payer: COMMERCIAL

## 2017-06-05 ENCOUNTER — TELEPHONE (OUTPATIENT)
Dept: CARDIOLOGY | Facility: CLINIC | Age: 52
End: 2017-06-05

## 2017-06-05 DIAGNOSIS — H05.10 IDIOPATHIC ORBITAL INFLAMMATORY SYNDROME: ICD-10-CM

## 2017-06-05 DIAGNOSIS — H04.001 LACRIMAL GLAND INFLAMMATION, RIGHT: Primary | ICD-10-CM

## 2017-06-05 DIAGNOSIS — I77.82 ANCA-ASSOCIATED VASCULITIS (H): ICD-10-CM

## 2017-06-05 LAB
MYELOPEROXIDASE AB SER-ACNC: 1.9 AI (ref 0–0.9)
PROTEINASE3 IGG SER-ACNC: ABNORMAL AI (ref 0–0.9)

## 2017-06-05 PROCEDURE — 99212 OFFICE O/P EST SF 10 MIN: CPT | Mod: ZF | Performed by: TECHNICIAN/TECHNOLOGIST

## 2017-06-05 PROCEDURE — 92015 DETERMINE REFRACTIVE STATE: CPT | Mod: ZF | Performed by: TECHNICIAN/TECHNOLOGIST

## 2017-06-05 ASSESSMENT — REFRACTION_WEARINGRX
OD_AXIS: 175
OS_AXIS: 008
OD_SPHERE: -4.00
OD_CYLINDER: +1.00
OS_SPHERE: -4.00
OS_CYLINDER: +1.00
OD_AXIS: 167
OS_SPHERE: -3.75
OD_SPHERE: -3.75
OS_AXIS: 010
OS_CYLINDER: +1.00
OD_CYLINDER: +1.00

## 2017-06-05 ASSESSMENT — CUP TO DISC RATIO
OS_RATIO: 0.2
OD_RATIO: 0.2

## 2017-06-05 ASSESSMENT — VISUAL ACUITY
OS_CC: 20/20
METHOD: SNELLEN - LINEAR
OS_CC+: -1
OD_CC: 20/20

## 2017-06-05 ASSESSMENT — REFRACTION_MANIFEST
OD_CYLINDER: +0.75
OS_AXIS: 010
OS_CYLINDER: +1.00
OD_AXIS: 170
OD_SPHERE: -3.75
OS_SPHERE: -3.75

## 2017-06-05 ASSESSMENT — MARGIN REFLEX DISTANCE
OS_MRD1: 4
OD_MRD1: 2

## 2017-06-05 ASSESSMENT — EXTERNAL EXAM - LEFT EYE: OS_EXAM: NORMAL

## 2017-06-05 ASSESSMENT — ENCOUNTER SYMPTOMS: JOINT SWELLING: 1

## 2017-06-05 ASSESSMENT — SLIT LAMP EXAM - LIDS: COMMENTS: NORMAL

## 2017-06-05 NOTE — PATIENT INSTRUCTIONS
Charis Holbrook MD  [facility] [place] [facility]    Surgery Scheduler: Nieves Pastor, COA/CMA: [phone]

## 2017-06-05 NOTE — MR AVS SNAPSHOT
After Visit Summary   6/5/2017    Janine Cornell    MRN: [id number]           Patient Information     Date Of Birth          1965        Visit Information        Provider Department      6/5/2017 12:45 PM Charis Holbrook MD [facility] P[facility] Eye [facility]        Today's Diagnoses     Lacrimal gland inflammation, right    -  1    Idiopathic orbital inflammatory syndrome        ANCA-associated vasculitis (H)          Care Instructions    Charis Holbrook MD  [facility]    Surgery Scheduler: Nieves Pastor, COA/CMA: [phone]              Follow-ups after your visit        Your next 10 appointments already scheduled     Cheo 15, 2017 10:30 AM CDT   (Arrive by 10:15 AM)   Return Visit with Majo Mckinnon MD   [facility] Rheumatology ([facility])    [address] [place] [id number] [phone]            Jun 28, 2017  4:00 PM CDT   Lab with  LAB   [facility] Lab ([facility])    [address] [place] [id number] [phone]            Jun 28, 2017  4:30 PM CDT   (Arrive by 4:15 PM)   Return Visit with Milan Peace MD   [facility] Heart Care ([facility])    [address] [place] [id number] [phone]              Who to contact     Please call your clinic at [phone] to:    Ask questions about your health    Make or cancel appointments    Discuss your medicines    Learn about your test results    Speak to your doctor   If you have compliments or concerns about an experience at your clinic, or if you wish to file a complaint, please contact [facility] Patient Relations at [phone] or email us at [email or web address]         Additional Information About Your Visit        MyChart Information     MyChart gives you secure access to your electronic health record. If you see a primary  care provider, you can also send messages to your care team and make appointments. If you have questions, please call your primary care clinic.  If you do not have a primary care provider, please call [phone] and they will assist you.      [employer] is an electronic gateway that provides easy, online access to your medical records. With [employer], you can request a clinic appointment, read your test results, renew a prescription or communicate with your care team.     To access your existing account, please contact your [facility] or call [phone] for assistance.        Care EveryWhere ID     This is your Care EveryWhere ID. This could be used by other organizations to access your [place] medical records  [id number]         Blood Pressure from Last 3 Encounters:   05/05/17 109/72   04/26/17 97/64   04/07/17 101/60    Weight from Last 3 Encounters:   05/05/17 76.4 kg (168 lb 6.4 oz)   04/26/17 77.6 kg (171 lb)   04/07/17 76.2 kg (168 lb)              Today, you had the following     No orders found for display         Today's Medication Changes          These changes are accurate as of: 6/5/17  1:29 PM.  If you have any questions, ask your nurse or doctor.               These medicines have changed or have updated prescriptions.        Dose/Directions    vitamin D 79814 UNIT capsule   Commonly known as:  ERGOCALCIFEROL   This may have changed:  additional instructions   Used for:  Vitamin D deficiency        Dose:  13848 Units   Take 1 capsule (50,000 Units) by mouth every 7 days Need a Vitamin D level in 2 months.   Quantity:  8 capsule   Refills:  0                Primary Care Provider Office Phone # Fax #    Kash Solano [phone]       [facility] [address] [place] [address]        Thank you!     Thank you for choosing [facility]  for your care. Our goal is always to provide you with excellent care. Hearing back  from our patients is one way we can continue to improve our services. Please take a few minutes to complete the written survey that you may receive in the mail after your visit with us. Thank you!             Your Updated Medication List - Protect others around you: Learn how to safely use, store and throw away your medicines at www.disposemymeds.org.          This list is accurate as of: 6/5/17  1:29 PM.  Always use your most recent med list.                   Brand Name Dispense Instructions for use    acetaminophen 325 MG tablet    TYLENOL    250 tablet    Take 1-2 tablets (325-650 mg) by mouth every 6 hours as needed for pain (headache)       AEROSPAN 80 MCG/ACT Aers oral inhaler   Generic drug:  flunisolide HFA      INHALE 2 PUFFS PO BID.  RINSE MOUTH AFTERWARDS       * albuterol 108 (90 BASE) MCG/ACT Inhaler    PROAIR HFA/PROVENTIL HFA/VENTOLIN HFA    3 Inhaler    Inhale 2 puffs into the lungs every 6 hours as needed for shortness of breath / dyspnea or wheezing       * albuterol (2.5 MG/3ML) 0.083% neb solution      INL 1 VIAL VIA NEBULIZATION Q 4 TO 6 HOURS PRN       ASPIRIN LOW DOSE 81 MG EC tablet   Generic drug:  aspirin     90 tablet    TAKE 1 TABLET BY MOUTH EVERY DAY       azaTHIOprine 50 MG tablet    IMURAN    30 tablet    Take 1 tablet (50 mg) by mouth daily TAKE BY MOUTH WITH FOOD.       azelastine 0.1 % spray    ASTELIN    1 Bottle    Spray 2 sprays into both nostrils 2 times daily       blood glucose monitoring lancets     4 Box    Use to test blood sugars 4 times daily or as directed.       blood glucose monitoring meter device kit    no brand specified    1 kit    Use to test blood sugar 4 X times daily or as directed.       blood glucose monitoring test strip    no brand specified    360 each    1 strip by In Vitro route 4 times daily Test as directed. Please dispense three months and three months of refills. Thank you.       clopidogrel 75 MG tablet    PLAVIX    90 tablet    Take 1 tablet (75  mg) by mouth daily       cyclobenzaprine 10 MG tablet    FLEXERIL    180 tablet    Take 1 tablet (10 mg) by mouth 2 times daily as needed for muscle spasms       desonide 0.05 % cream    DESOWEN     Apply topically 2 times daily       dicyclomine 20 MG tablet    BENTYL    60 tablet    Take 1 tablet (20 mg) by mouth 4 times daily as needed       diphenhydrAMINE 25 MG capsule    BENADRYL     TK 1 TO 2 CS PO QHS       EPIPEN 2-RIKY 0.3 MG/0.3ML injection   Generic drug:  EPINEPHrine      INJECT 0.3 MG INTO THE MUSCLE PRF ANAPHYALAXIS       ESOMEPRAZOLE MAGNESIUM PO      Take 20 mg by mouth 2 times daily (before meals)       ferrous gluconate 324 (38 FE) MG tablet    FERGON    180 tablet    Take 1 tablet (324 mg) by mouth 2 times daily       fexofenadine 180 MG tablet    ALLEGRA    90 tablet    Take 1 tablet by mouth daily as needed.       fluconazole 100 MG tablet    DIFLUCAN     Take 100 mg by mouth daily as needed       fluocinolone 0.01 % solution    SYNALAR     Apply topically daily as needed       fluocinonide 0.05 % solution    LIDEX    60 mL    Apply topically 2 times daily To areas of itch on the scalp as needed.       fluticasone 50 MCG/ACT spray    FLONASE     U 2 SPRAYS IEN D.       hydrALAZINE 25 MG tablet    APRESOLINE    90 tablet    Take 0.5 tablets (12.5 mg) by mouth daily . May take additional one-half tablet once daily if systolic blood pressure >140 mmHg.       hydroxychloroquine 200 MG tablet    PLAQUENIL    180 tablet    Take 2 tablets (400 mg) by mouth daily EYE EXAM DUE/LETTER SENT       insulin glargine 100 UNIT/ML injection     18 mL    Inject 40 Units Subcutaneous daily       insulin pen needle 32G X 4 MM    BD MARCK U/F    300 each    USE DAILY OR AS DIRECTED       ketoconazole 2 % shampoo    NIZORAL     Apply topically daily as needed       Ketoprofen Powd     100 g    Apply 1 g topically 2 times daily as needed       lidocaine 5 % ointment    XYLOCAINE    50 g    Apply 1 g topically 2 times  daily as needed for moderate pain       lisinopril-hydrochlorothiazide 20-25 MG per tablet    PRINZIDE/ZESTORETIC    90 tablet    TAKE 1 TABLET BY MOUTH DAILY       Magnesium Oxide -Mg Supplement 250 MG Tabs      TK 1 T PO BID. REDUCE IF STOOLS LOOSEN       metFORMIN 500 MG 24 hr tablet    GLUCOPHAGE-XR    60 tablet    Take 2 tablets (1,000 mg) by mouth daily (with dinner)       metoprolol 100 MG 24 hr tablet    TOPROL-XL    90 tablet    Take 1 tablet (100 mg) by mouth daily       metroNIDAZOLE 1 % cream    NORITATE     Apply topically daily       mometasone 50 MCG/ACT spray    NASONEX     Spray 2 sprays into both nostrils daily       montelukast 10 MG tablet    SINGULAIR    30 tablet    Take 1 tablet (10 mg) by mouth At Bedtime       nitroglycerin 0.4 MG sublingual tablet    NITROSTAT    25 tablet    Place 1 tablet (0.4 mg) under the tongue every 5 minutes as needed for chest pain       nystatin 632838 UNITS Tabs tablet    MYCOSTATIN     TK 2 TS PO BID       olopatadine 0.1 % ophthalmic solution    PATANOL    5 mL    Place 1 drop into both eyes 2 times daily as needed for allergies.       pravastatin 40 MG tablet    PRAVACHOL    30 tablet    Take 1 tablet (40 mg) by mouth daily       predniSONE 10 MG tablet    DELTASONE    60 tablet    40-30-20-10 mg a day each for 3 days then stop       ranitidine 150 MG tablet    ZANTAC    60 tablet    Take 1 tablet (150 mg) by mouth 2 times daily       spironolactone 25 MG tablet    ALDACTONE    60 tablet    Take 2 tablets (50 mg) by mouth daily       * sucralfate 1 GM tablet    CARAFATE    40 tablet    Take 1 tablet (1 g) by mouth 4 times daily as needed       * sucralfate 1 GM tablet    CARAFATE    120 tablet    Take 1 tablet (1 g) by mouth 4 times daily as needed       traMADol 50 MG tablet    ULTRAM    60 tablet    Take 1 tablet (50 mg) by mouth every 8 hours as needed for moderate pain       TUMS 500 MG chewable tablet   Generic drug:  calcium carbonate      Take 3-4 chew  tab by mouth daily as needed.       vitamin D 94586 UNIT capsule    ERGOCALCIFEROL    8 capsule    Take 1 capsule (50,000 Units) by mouth every 7 days Need a Vitamin D level in 2 months.       ZOFRAN PO          * Notice:  This list has 4 medication(s) that are the same as other medications prescribed for you. Read the directions carefully, and ask your doctor or other care provider to review them with you.

## 2017-06-05 NOTE — NURSING NOTE
Chief Complaint   Patient presents with     lacrimal gland inflammation     rheumatoid arthritis with persistent flares of right superior orbital/lacrimal gland inflammation since winter of 2016, biopsy March 2016 of the lacrimal gland showed inflammation with microabscesses, RE does not tear, c/o film covering eye and redness - unsure if associated with allergies, VA blurry, using systane prn when eyes feel dry      HPI    Symptoms:              Comments:  MR BRAIN AND ORBITS 5/9/2017 4:58 PM     Orbit MRI without and with contrast  Brain MRI without and with contrast     History:  MRI brain and R orbit with and without IV contrast, has  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      Comparison: MRI brain 5/29/2013      Technique:   Orbits: Axial and coronal T1-weighted, and coronal T2-weighted images  obtained without intravenous contrast. Post-intravenous contrast  (using gadolinium) sagittal FLAIR, were obtained with fat-saturation,  focused on the orbits.  Brain: Axial susceptibility-weighted and FLAIR sequences were obtained  of the whole brain without intravenous contrast, and postcontrast  axial T1-weighted images were obtained through the whole brain.   Contrast: 7.5mL Gadavist     Findings:  New asymmetric enlargement of the right lacrimal gland and enhancement  of the right lacrimal gland with surrounding ill-defined crescentic T2  hyperintense signal and enhancement within the right superior  superotemporal orbit, predominantly involving the extraconal space  with slight intraconal extension. No definite associated restricted  diffusion. No discrete extraocular muscle enlargement. Normal  symmetric optic nerve signal. Cavernous sinuses and orbital apices are  normal. Globes are normal.     Whole brain images are symmetric minimal leukoaraiosis and generalized  parenchymal volume loss. Ventricles are not enlarged. No intracranial  hemorrhage, mass effect, midline  shift or abnormal extra axial fluid  collection. No abnormal foci of intracranial enhancement or restricted  diffusion. Paranasal sinuses and mastoid air cells are clear.            Impression:    New abnormal enlargement of the right lacrimal gland with surrounding  ill-defined T2 hyperintense signal and enhancement centered in the  superotemporal right orbital fat, which may represent   infectious/inflammatory dacryadenitis, orbital pseudotumor, lymphoma,  carcinoma, sarcoidosis or IgG4 disease..     I have personally reviewed the examination and initial interpretation  and I agree with the findings.     PATRICIA BRANTLEY MD

## 2017-06-05 NOTE — LETTER
" 2017         RE:  :  MRN: Janine Cornell  1965  [id number]     Dear Dr. Vernon,    Thank you for allowing me to see your patient, Janine Cornell, for an oculoplastic   consultation.  My assessment and plan are below.  For further details, please see my attached clinic note.      Chief Complaints and History of Present Illnesses   Patient presents with     lacrimal gland inflammation       rheumatoid arthritis with persistent flares of right superior orbital/lacrimal gland inflammation since winter of 2016, biopsy 2016 of the lacrimal gland showed inflammation with microabscesses, RE does not tear, c/o film covering eye and redness - unsure if associated with allergies, VA blurry, using systane prn when eyes feel dry       Headache since \"young.\"  Sept or 2015 headaches worsened, went to see a physician. Referred to pain management. Headaches became much worse after Botox Dec 2015.   Facial swelling and pain. So bad has to sleep with head up. Then one day right eye was swollen shut. Eye felt like it was twisting.   MRI revealed some form of mass. Biopsy was obtained of lacrimal gland (OLIVIER):     FINAL DIAGNOSIS:   Right orbital mass, biopsy-   - Portion of lacrimal gland with acute and chronic dacryoadenitis   associated with microabscess formation.  Negative for malignancy     She has had a positive p-ANCA and MPO, as well as Anti-nuclear antibody (borderline)  Has seen Dr. Mckinnon, treated with MTX, d/cd due to side effects, now on plaquenil. When she was on prednisone it did help but trouble tapering.        I reviewed her imaging which reveals a mass in the superolateral orbit which is avidly enhancing.   Assessment & Plan     Janine Cornell is a 51 year old female with the following diagnoses:   1. Lacrimal gland inflammation, right    2. Idiopathic orbital inflammatory syndrome    3. ANCA-associated vasculitis (H)       I discussed with Janine Cornell that I agree with Dr. Vernon and Dr. Mckinnon " that this is a tough situation. She feels she had a poor experience with prior biopsy. She feels the incision is not on the eyelid crease, and feels she was led to expect the biopsy would cure her problem which it didn't    I had a long discussion with her about what a repeat biopsy could potentially achieve. I did tell her I cannot promise i will get a diagnosis. I also emphasized all of the risks of orbital surgery including vision loss, worsening of her already droopy eyelid, change in pupil size and double vision. I also told her the biopsy may be inconclusive, or similar to before.     Plan:  Discuss with her cardiologist if ok to hold plavix. If so can consider right orbitotomy and repeat biopsy of lacrimal gland.       Again, thank you for allowing me to participate in the care of your patient.      Sincerely,    Charis Holbrook MD  Department of Ophthalmology and Visual Neurosciences  [facility]    CC: Kash Solano MD  Primary Care Center  [address] [facility] [address]  [facility] [address]  VIA In Basket     Jas Vernon MD   Physicians  [address]  [facility] [address]  VIA In Basket     Majo Mckinnon MD  [facility]  [address]  [facility] [address]  VIA In Basket

## 2017-06-05 NOTE — PROGRESS NOTES
"Chief Complaints and History of Present Illnesses   Patient presents with     lacrimal gland inflammation     rheumatoid arthritis with persistent flares of right superior orbital/lacrimal gland inflammation since winter of 2016, biopsy March 2016 of the lacrimal gland showed inflammation with microabscesses, RE does not tear, c/o film covering eye and redness - unsure if associated with allergies, VA blurry, using systane prn when eyes feel dry      Headache since \"young.\"  Sept or October of 2015 headaches worsened, went to see a physician. Referred to pain management. Headaches became much worse after Botox Dec 2015.   Facial swelling and pain. So bad has to sleep with head up. Then one day right eye was swollen shut. Eye felt like it was twisting.   MRI revealed some form of mass. Biopsy was obtained of lacrimal gland (OLIVIER):    FINAL DIAGNOSIS:   Right orbital mass, biopsy-   - Portion of lacrimal gland with acute and chronic dacryoadenitis   associated with microabscess formation.  Negative for malignancy    She has had a positive p-ANCA and MPO, as well as Anti-nuclear antibody (borderline)  Has seen Dr. Mckinnon, treated with MTX, d/cd due to side effects, now on plaquenil. When she was on prednisone it did help but trouble tapering.      I reviewed her imaging which reveals a mass in the superolateral orbit which is avidly enhancing.   Assessment & Plan     Janine Cornell is a 51 year old female with the following diagnoses:   1. Lacrimal gland inflammation, right    2. Idiopathic orbital inflammatory syndrome    3. ANCA-associated vasculitis (H)       I discussed with Janine Cornell that I agree with Dr. Vernon and Dr. Mckinnon that this is a tough situation. She feels she had a poor experience with prior biopsy. She feels the incision is not on the eyelid crease, and feels she was led to expect the biopsy would cure her problem which it didn't    I had a long discussion with her about what a repeat biopsy could " potentially achieve. I did tell her I cannot promise i will get a diagnosis. I also emphasized all of the risks of orbital surgery including vision loss, worsening of her already droopy eyelid, change in pupil size and double vision. I also told her the biopsy may be inconclusive, or similar to before.     Plan:  Discuss with her cardiologist if ok to hold plavix. If so can consider right orbitotomy and repeat biopsy of lacrimal gland.        Complete documentation of historical and exam elements from today's encounter can be found in the full encounter summary report (not reduplicated in this progress note). I personally obtained the chief complaint(s) and history of present illness.  I confirmed and edited as necessary the review of systems, past medical/surgical history, family history, social history, and examination findings as documented by others; and I examined the patient myself. I personally reviewed the relevant tests, images, and reports as documented above. I formulated and edited as necessary the assessment and plan and discussed the findings and management plan with the patient and family. - Charis Holbrook MD

## 2017-06-05 NOTE — TELEPHONE ENCOUNTER
Patient called wondering if it is okay to stop her plavix so she can have a procedure done (bx) or should she wait to see Dr. Peace later this month.  She can be reached @ [phone]

## 2017-06-06 ENCOUNTER — CARE COORDINATION (OUTPATIENT)
Dept: CARDIOLOGY | Facility: CLINIC | Age: 52
End: 2017-06-06

## 2017-06-06 DIAGNOSIS — I10 ESSENTIAL HYPERTENSION: ICD-10-CM

## 2017-06-06 RX ORDER — HYDRALAZINE HYDROCHLORIDE 25 MG/1
12.5 TABLET, FILM COATED ORAL 3 TIMES DAILY PRN
Qty: 90 TABLET | Refills: 3
Start: 2017-06-06 | End: 2017-11-15

## 2017-06-06 NOTE — PROGRESS NOTES
Date: 6/6/2017    Time of Call: 3:40 PM     Diagnosis:  CAD, hypertension     [ TORB ] Ordering provider: Dr. Peace  Order: OK to hold Plavix 7 days before eye surgery.  Change hydralazine to 12.5 mg by mouth up to 3 times daily as needed for SBP >140 mmHg.       Order received by: Lauren Francis, RN, BSN     Follow-up/additional notes: Patient called yesterday inquiring if plavix could be held for upcoming eye surgery.  She also complained of recently new dizziness.  She reports lower blood pressures recently.  Reviewed chart and call details with Dr. Peace. Orders received. Left patient detailed voice message with above information and encouraged her to return call to confirm receipt of message and answer any questions.

## 2017-06-15 ENCOUNTER — TELEPHONE (OUTPATIENT)
Dept: PHARMACY | Facility: OTHER | Age: 52
End: 2017-06-15

## 2017-06-15 ENCOUNTER — OFFICE VISIT (OUTPATIENT)
Dept: RHEUMATOLOGY | Facility: CLINIC | Age: 52
End: 2017-06-15
Attending: INTERNAL MEDICINE
Payer: COMMERCIAL

## 2017-06-15 VITALS
OXYGEN SATURATION: 99 % | BODY MASS INDEX: 29.77 KG/M2 | WEIGHT: 168 LBS | HEART RATE: 72 BPM | SYSTOLIC BLOOD PRESSURE: 120 MMHG | HEIGHT: 63 IN | DIASTOLIC BLOOD PRESSURE: 82 MMHG

## 2017-06-15 DIAGNOSIS — M48.02 SPINAL STENOSIS IN CERVICAL REGION: ICD-10-CM

## 2017-06-15 DIAGNOSIS — G43.009 MIGRAINE WITHOUT AURA AND WITHOUT STATUS MIGRAINOSUS, NOT INTRACTABLE: ICD-10-CM

## 2017-06-15 DIAGNOSIS — B37.0 THRUSH: ICD-10-CM

## 2017-06-15 DIAGNOSIS — M06.00 SERONEGATIVE RHEUMATOID ARTHRITIS (H): ICD-10-CM

## 2017-06-15 DIAGNOSIS — M79.7 FIBROMYALGIA: Primary | ICD-10-CM

## 2017-06-15 PROCEDURE — 99212 OFFICE O/P EST SF 10 MIN: CPT | Mod: ZF

## 2017-06-15 RX ORDER — FLUCONAZOLE 200 MG/1
200 TABLET ORAL DAILY
Qty: 10 TABLET | Refills: 0 | Status: ON HOLD | OUTPATIENT
Start: 2017-06-15 | End: 2017-08-04

## 2017-06-15 ASSESSMENT — PAIN SCALES - GENERAL: PAINLEVEL: SEVERE PAIN (7)

## 2017-06-15 NOTE — MR AVS SNAPSHOT
After Visit Summary   6/15/2017    Janine Cornell    MRN: [id number]           Patient Information     Date Of Birth          1965        Visit Information        Provider Department      6/15/2017 10:30 AM Majo Mckinnon MD [facility]        Today's Diagnoses     Fibromyalgia    -  1    Seronegative rheumatoid arthritis (H)        Migraine without aura and without status migrainosus, not intractable        Spinal stenosis in cervical region          Care Instructions    Will wait for biopsy result    Follow up in 4-5 months          Follow-ups after your visit        Additional Services     Other Specialty Referral       Accupunture referral for [facility].                  Your next 10 appointments already scheduled     Jun 28, 2017  4:00 PM CDT   Lab with  LAB   [facility] ([facility])    [address] [place] [address]  [address] [facility] [address] [phone]            Jun 28, 2017  4:30 PM CDT   (Arrive by 4:15 PM)   Return Visit with Milan Peace MD   [facility] ([facility])    [address] [place] [address] [phone]              Who to contact     If you have questions or need follow up information about today's clinic visit or your schedule please contact [facility] RHEUMATOLOGY directly at [phone].  Normal or non-critical lab and imaging results will be communicated to you by MyChart, letter or phone within 4 business days after the clinic has received the results. If you do not hear from us within 7 days, please contact the clinic through MyChart or phone. If you have a critical or abnormal lab result, we will notify you by phone as soon as possible.  Submit refill requests through [employer] or call your pharmacy and they will forward the refill request to us. Please allow 3 business days for your refill to be completed.          Additional Information About  "Your Visit        [employer] Information     [employer] gives you secure access to your electronic health record. If you see a primary care provider, you can also send messages to your care team and make appointments. If you have questions, please call your primary care clinic.  If you do not have a primary care provider, please call [phone] and they will assist you.        Care EveryWhere ID     This is your Care EveryWhere ID. This could be used by other organizations to access your [place] medical records  [id number]        Your Vitals Were     Pulse Height Pulse Oximetry BMI (Body Mass Index)          72 1.6 m (5' 3\") 99% 29.76 kg/m2         Blood Pressure from Last 3 Encounters:   06/15/17 120/82   05/05/17 109/72   04/26/17 97/64    Weight from Last 3 Encounters:   06/15/17 76.2 kg (168 lb)   05/05/17 76.4 kg (168 lb 6.4 oz)   04/26/17 77.6 kg (171 lb)              We Performed the Following     Other Specialty Referral          Today's Medication Changes          These changes are accurate as of: 6/15/17 11:55 AM.  If you have any questions, ask your nurse or doctor.               These medicines have changed or have updated prescriptions.        Dose/Directions    hydrALAZINE 25 MG tablet   Commonly known as:  APRESOLINE   This may have changed:    - when to take this  - additional instructions   Used for:  Essential hypertension        Dose:  12.5 mg   Take 0.5 tablets (12.5 mg) by mouth 3 times daily as needed , for systolic blood pressure >140 mmHg.   Quantity:  90 tablet   Refills:  3       vitamin D 62231 UNIT capsule   Commonly known as:  ERGOCALCIFEROL   This may have changed:  additional instructions   Used for:  Vitamin D deficiency        Dose:  69174 Units   Take 1 capsule (50,000 Units) by mouth every 7 days Need a Vitamin D level in 2 months.   Quantity:  8 capsule   Refills:  0         Stop taking these medicines if you haven't already. Please contact your care team if you have questions.     " azaTHIOprine 50 MG tablet   Commonly known as:  IMURAN   Stopped by:  Majo Mckinnon MD           predniSONE 10 MG tablet   Commonly known as:  DELTASONE   Stopped by:  Majo Mckinnon MD                    Primary Care Provider Office Phone # Fax #    Kash Solano [phone]       PRIMARY CARE CENTER [address] [place] [address]  [facility] [address]        Thank you!     Thank you for choosing [facility] RHEUMATOLOGY  for your care. Our goal is always to provide you with excellent care. Hearing back from our patients is one way we can continue to improve our services. Please take a few minutes to complete the written survey that you may receive in the mail after your visit with us. Thank you!             Your Updated Medication List - Protect others around you: Learn how to safely use, store and throw away your medicines at www.disposemymeds.org.          This list is accurate as of: 6/15/17 11:55 AM.  Always use your most recent med list.                   Brand Name Dispense Instructions for use    acetaminophen 325 MG tablet    TYLENOL    250 tablet    Take 1-2 tablets (325-650 mg) by mouth every 6 hours as needed for pain (headache)       AEROSPAN 80 MCG/ACT Aers oral inhaler   Generic drug:  flunisolide HFA      INHALE 2 PUFFS PO BID.  RINSE MOUTH AFTERWARDS       * albuterol 108 (90 BASE) MCG/ACT Inhaler    PROAIR HFA/PROVENTIL HFA/VENTOLIN HFA    3 Inhaler    Inhale 2 puffs into the lungs every 6 hours as needed for shortness of breath / dyspnea or wheezing       * albuterol (2.5 MG/3ML) 0.083% neb solution      INL 1 VIAL VIA NEBULIZATION Q 4 TO 6 HOURS PRN       ASPIRIN LOW DOSE 81 MG EC tablet   Generic drug:  aspirin     90 tablet    TAKE 1 TABLET BY MOUTH EVERY DAY       azelastine 0.1 % spray    ASTELIN    1 Bottle    Spray 2 sprays into both nostrils 2 times daily       blood glucose monitoring lancets     4 Box    Use to test blood sugars 4 times daily or as directed.        blood glucose monitoring meter device kit    no brand specified    1 kit    Use to test blood sugar 4 X times daily or as directed.       blood glucose monitoring test strip    no brand specified    360 each    1 strip by In Vitro route 4 times daily Test as directed. Please dispense three months and three months of refills. Thank you.       clopidogrel 75 MG tablet    PLAVIX    90 tablet    Take 1 tablet (75 mg) by mouth daily       cyclobenzaprine 10 MG tablet    FLEXERIL    180 tablet    Take 1 tablet (10 mg) by mouth 2 times daily as needed for muscle spasms       desonide 0.05 % cream    DESOWEN     Apply topically 2 times daily       dicyclomine 20 MG tablet    BENTYL    60 tablet    Take 1 tablet (20 mg) by mouth 4 times daily as needed       diphenhydrAMINE 25 MG capsule    BENADRYL     TK 1 TO 2 CS PO QHS       EPIPEN 2-RIKY 0.3 MG/0.3ML injection   Generic drug:  EPINEPHrine      INJECT 0.3 MG INTO THE MUSCLE PRF ANAPHYALAXIS       ESOMEPRAZOLE MAGNESIUM PO      Take 20 mg by mouth 2 times daily (before meals)       ferrous gluconate 324 (38 FE) MG tablet    FERGON    180 tablet    Take 1 tablet (324 mg) by mouth 2 times daily       fexofenadine 180 MG tablet    ALLEGRA    90 tablet    Take 1 tablet by mouth daily as needed.       fluconazole 100 MG tablet    DIFLUCAN     Take 100 mg by mouth daily as needed       fluocinolone 0.01 % solution    SYNALAR     Apply topically daily as needed       fluocinonide 0.05 % solution    LIDEX    60 mL    Apply topically 2 times daily To areas of itch on the scalp as needed.       fluticasone 50 MCG/ACT spray    FLONASE     U 2 SPRAYS IEN D.       hydrALAZINE 25 MG tablet    APRESOLINE    90 tablet    Take 0.5 tablets (12.5 mg) by mouth 3 times daily as needed , for systolic blood pressure >140 mmHg.       hydroxychloroquine 200 MG tablet    PLAQUENIL    180 tablet    Take 2 tablets (400 mg) by mouth daily EYE EXAM DUE/LETTER SENT       insulin glargine 100 UNIT/ML  injection     18 mL    Inject 40 Units Subcutaneous daily       insulin pen needle 32G X 4 MM    BD MARCK U/F    300 each    USE DAILY OR AS DIRECTED       ketoconazole 2 % shampoo    NIZORAL     Apply topically daily as needed       Ketoprofen Powd     100 g    Apply 1 g topically 2 times daily as needed       lidocaine 5 % ointment    XYLOCAINE    50 g    Apply 1 g topically 2 times daily as needed for moderate pain       lisinopril-hydrochlorothiazide 20-25 MG per tablet    PRINZIDE/ZESTORETIC    90 tablet    TAKE 1 TABLET BY MOUTH DAILY       Magnesium Oxide -Mg Supplement 250 MG Tabs      TK 1 T PO BID. REDUCE IF STOOLS LOOSEN       metFORMIN 500 MG 24 hr tablet    GLUCOPHAGE-XR    60 tablet    Take 2 tablets (1,000 mg) by mouth daily (with dinner)       metoprolol 100 MG 24 hr tablet    TOPROL-XL    90 tablet    Take 1 tablet (100 mg) by mouth daily       metroNIDAZOLE 1 % cream    NORITATE     Apply topically daily       mometasone 50 MCG/ACT spray    NASONEX     Spray 2 sprays into both nostrils daily       montelukast 10 MG tablet    SINGULAIR    30 tablet    Take 1 tablet (10 mg) by mouth At Bedtime       nitroglycerin 0.4 MG sublingual tablet    NITROSTAT    25 tablet    Place 1 tablet (0.4 mg) under the tongue every 5 minutes as needed for chest pain       nystatin 533077 UNITS Tabs tablet    MYCOSTATIN     TK 2 TS PO BID       olopatadine 0.1 % ophthalmic solution    PATANOL    5 mL    Place 1 drop into both eyes 2 times daily as needed for allergies.       pravastatin 40 MG tablet    PRAVACHOL    30 tablet    Take 1 tablet (40 mg) by mouth daily       ranitidine 150 MG tablet    ZANTAC    60 tablet    Take 1 tablet (150 mg) by mouth 2 times daily       spironolactone 25 MG tablet    ALDACTONE    60 tablet    Take 2 tablets (50 mg) by mouth daily       sucralfate 1 GM tablet    CARAFATE    120 tablet    Take 1 tablet (1 g) by mouth 4 times daily as needed       traMADol 50 MG tablet    ULTRAM    60  tablet    Take 1 tablet (50 mg) by mouth every 8 hours as needed for moderate pain       TUMS 500 MG chewable tablet   Generic drug:  calcium carbonate      Take 3-4 chew tab by mouth daily as needed.       vitamin D 24628 UNIT capsule    ERGOCALCIFEROL    8 capsule    Take 1 capsule (50,000 Units) by mouth every 7 days Need a Vitamin D level in 2 months.       ZOFRAN PO          * Notice:  This list has 2 medication(s) that are the same as other medications prescribed for you. Read the directions carefully, and ask your doctor or other care provider to review them with you.

## 2017-06-15 NOTE — PATIENT INSTRUCTIONS
Will wait for biopsy result    Fluconazole 200 mg a day x 10 days for thrush    Follow up in 4-5 months

## 2017-06-15 NOTE — PROGRESS NOTES
Rheumatology F/U Note    Last visit note: 5/5/2017        Today's visit date: 6/15/2017    Reason for visit: RA, Fibromyalgia     HPI from last visit    Ms. Cornell is a 49 yo AAF who was referred to our clinic for evaluation and management of her joint pain in setting of positive RF 26.    Her joint symptoms first started more than a year ago, but over last 6-8 months fluctuating some good and bad days . All her joints are involved. Over last 5 months, hands became swollen, warm and painful. Tylenol does not help. Ketoprofen did not help. Was told to avoid NSAIDs given ACS. Reports AM/PM stiffness x 2-3 hr, can't make full fist when wakes up in AM.    She feels tired all the time. Reports worsening hair loss and unchanged  facial rash. Gets red sore flaky rash over cheeks across her face which is intermittent diagnosed Rosacea and is prescribed metronidazole gel which she has not started using. Reports occasional oral ulcers. Hands feel cold with red discoloration last winter. Has occasional dysphagia to both solids and liquid. Has dry eyes, is using OTC allergy eyedrops. Has chronic abdominal pain. Has h/o pancreatitis. Sometime has anxiety. Occasionally has numbness, tingling in fingers/toes. Has back and spine issues, her whole back and neck hurts, different areas at different time. She is wondering if she has FMS. Has hard time sleeping, has never been diagnosed with BRENNA. She does not know if he snores. She was found to have slightly positive RF in 2/2013. Has microcytic anemia.     Her arthralgia gets worse with drop in temperature. Reports body ache. Activity makes her pain worse. Her joint swelling isintermittent. Her body pain and joint pain is the same. Reports AM stiffness x 3 hr.    She has been doing acupuncture x few months and it is helping with her pain. She thinks that the combination of plaquenil and acupuncture is helpful but overall she notice 30% in her symptoms relief.     Takes tylenol and  tramadol on occasion for pain.    She decided to use different formulation of metformin which helped with her abdominal pain. I referred her to GI for evaluation of elevated lipase and abdominal pain. She decided to see GI in the future.       She is on  mg qd since 5/2014, tolerates it well and it helps her some. Denies taking any gabapentin due to chest pain and headches. She has had referral her for water aerobics, but she can't begin until she's healed from recent surgery in 10/2014. She thinks HCQ is helping but not enough to control all her pain, reports 2-3 hr of AM stiffness with ongoing diffuse arthralgia/myalgia along with intermittent swelling of hands. She does see her acupuncture person and it helps with her pain, has not started water aerobics yet. Has got her flu shot.       10/2015: Reports having pleuritic CP in 3/2015, was prescribed Lidoderm patches first. It did not help. Took prednisone (?dose) by her own, pain got better but it recurred off prednisone and gradually got worse to the point that she could not stand the pain anymore, was seen in ER in 5/2015, was treated with medrol dose pack which helped. Reports pain over hips, knees, ankles and fingers. Pain gets worse with walking. Reports myalgia, swelling of the arms. Pain over neck, shoulders. Has AM stiffness x 3-4 hr. Fingers swell up and become painful. Can't remember if steroid helped with joint pain. She had headaches, severe CP when she took tramadol and gabapentin and stopped taking them, sx resolved but she can't tell which one caused SEs but thinks that probably it was gabapentin. She has good days and tries to be more active but activity aggravates the pain. Headaches are intermittent. HCQ helps some not enough to control all the pain. No HCQ SEs. Had eye exam around July 2015. Flexeril helps but PCP wants to change flexeril to zanaflex, reporting that she is NOT comfortable with the change. Acupuncture still helps an she  continued to  do that. Concerned about fat pads she has in supraclavicular area, has h/o thyroid nodules. Continues having hair loss, takes iron for iron deficiency.     2/2016: She has 2 major complaints today, increased joint pain/swelling in hands/feet and recent Dx of optic neuritis in R eye, reports having similar problem about 20 yr ago, now recurred. Has a spot in R eye vision x long term, was seen by ophthalmology few days ago and was told to have optic neuritis. Gets joint pain, muscle pain. Can't  objects, hands are swollen. Knees, hips and ankles are painful. Reports more arthralgia. AM stiffness/ pain is 3-4 hr. She wants me to talk to her ophthalmologist directly.      She had botox inj for migraines which did not help her. She is going to have angiogram next wk, had to take more nitro for CP recently.       4/29/2016: She reports being on steroid taper x 3 since last visit. Reportedly, had orbit MRI, it showed swelling/inflamamtion of R lacrimal glands. She had painful swelling of her R eye, cause her headaches. Botox inj made her headaches worse. She is being dealing with this since 1/2016, with severe headaches and pain/swelling around R eye. Had biopsy in 3/2016. She is frustrated as prednisone causes her weight gain and her BG is difficult to control on it.    5/2016: At last visit, was prescribed MTX 10 mg po qwk to take along with FA 1 mg qd. She decided not to take MTX, is afraid of side effects, believes all these medications would harm her. She also believes that botox caused her inflammation around R eye. No major flare up of per-orbital inflamamtion since last visit but continues to have swelling around her R eye.      11/2016: Reports diffuse body ache, arthralgia, myalgia especially with weather change. No major flare up of campso-orbital inflammation but her face/around R eye swells up from time to time. Reports 2 episodes of CP over past 2 mo, nitro sometimes helps and sometimes does  not help. She has asthma and costochondritis and she has hard time to distinguish the origin of pain. Sometimes knees and fingers swell up. Her stiffness is sometimes all day.    4/2017:  Reports having sinusitis since last visit. Her R eye is dry and is using eyedrops to keep it moist. Reports having pain in ears. Was treated with antibiotics by PCP, it got better then it got worse. Reports catching infections easily. Then started having pressure over eyes with pain/headaches (exact start date is unknown). Pain gradually got worse and became persistent. Had sinus CT.     4/7/2017: Having a flare up of swelling, pain around her R orbit. Has not tried MTX yet.      5/2017: On MTX 10 mg po qwk since 4/7/2017, reports increased stomach pain and nausea and new headaches since start of MTX and it's not helping. Pain/swelling around R orbit is worse.    Today: She finished prednisone taper prescribed at last visit, R campos-orbital swelling significantly improved. 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    Her records were reviewed.        Component      Latest Ref Rng 2/28/2013 2/28/2013          12:14 PM 12:14 PM   WBC      4.0 - 11.0 10e9/L     RBC Count      3.8 - 5.2 10e12/L     Hemoglobin      11.7 - 15.7 g/dL     Hematocrit      35.0 - 47.0 %     MCV      78 - 100 fl     MCH      26.5 - 33.0 pg     MCHC      31.5 - 36.5 g/dL     RDW      10.0 - 15.0 %     Platelet Count      150 - 450 10e9/L     Specimen Description           Lyme Screen IgG and IgM           Vitamin D Deficiency screening      30 - 75 ug/L     Ferritin      10 - 300 ng/mL     Sed Rate      0 - 20 mm/h     ALLI Screen by EIA      <1.0     Rheumatoid Factor      0 - 14 IU/mL     CK Total      30 - 225 U/L  78   Uric Acid      2.5 - 7.5 mg/dL 6.7      Component      Latest Ref Rng 2/28/2013           12:14 PM   WBC      4.0 - 11.0 10e9/L    RBC Count      3.8 - 5.2 10e12/L    Hemoglobin      11.7 - 15.7 g/dL    Hematocrit      35.0 - 47.0 %    MCV      78 - 100 fl    MCH      26.5 - 33.0 pg    MCHC      31.5 - 36.5 g/dL    RDW      10.0 - 15.0 %    Platelet Count      150 - 450 10e9/L    Specimen Description       Serum   Lyme Screen IgG and IgM       Test value: <0.75....Interpretation: Negative....If you highly suspect Lyme . . .   Vitamin D Deficiency screening      30 - 75 ug/L    Ferritin      10 - 300 ng/mL    Sed Rate      0 - 20 mm/h    ALLI Screen by EIA      <1.0    Rheumatoid Factor      0 - 14 IU/mL    CK Total      30 - 225 U/L    Uric Acid      2.5 - 7.5 mg/dL      Component      Latest Ref Rng 2/28/2013 2/28/2013 2/28/2013 2/28/2013          12:14 PM 12:14 PM 12:14 PM 12:14 PM   WBC      4.0 - 11.0 10e9/L       RBC Count      3.8 - 5.2 10e12/L       Hemoglobin      11.7 - 15.7 g/dL       Hematocrit      35.0 - 47.0 %       MCV      78 - 100 fl       MCH      26.5 - 33.0 pg       MCHC      31.5 - 36.5 g/dL       RDW      10.0 - 15.0 %       Platelet Count      150 - 450 10e9/L       Specimen Description             Lyme Screen IgG and IgM             Vitamin D Deficiency screening      30 - 75 ug/L       Ferritin      10 - 300 ng/mL    10   Sed Rate      0 - 20 mm/h   23 (H)    ALLI Screen by EIA      <1.0  <1.0 . . .     Rheumatoid Factor      0 - 14 IU/mL 26 (H)      CK Total      30 - 225 U/L       Uric Acid      2.5 - 7.5 mg/dL         Component      Latest Ref Rng 2/28/2013 2/28/2013          12:14 PM 12:14 PM   WBC      4.0 - 11.0 10e9/L 14.1 (H)    RBC Count      3.8 - 5.2 10e12/L 4.55    Hemoglobin      11.7 - 15.7 g/dL 10.7 (L)    Hematocrit      35.0 - 47.0 % 33.3 (L)    MCV      78 - 100 fl 73 (L)    MCH      26.5 - 33.0 pg 23.5 (L)    MCHC      31.5 - 36.5 g/dL 32.1    RDW      10.0 - 15.0 % 18.1 (H)    Platelet Count      150 - 450 10e9/L 407    Specimen Description           Lyme Screen  IgG and IgM           Vitamin D Deficiency screening      30 - 75 ug/L  32   Ferritin      10 - 300 ng/mL     Sed Rate      0 - 20 mm/h     ALLI Screen by EIA      <1.0     Rheumatoid Factor      0 - 14 IU/mL     CK Total      30 - 225 U/L     Uric Acid      2.5 - 7.5 mg/dL          MRI CERVICAL SPINE WITHOUT CONTRAST 4/3/2013 12:47 PM    HISTORY: Cervicalgia. Degeneration of cervical intervertebral disc.  MRI cervical spine. Evaluate bilateral supraclavicular lymph nodes.  Clinical enlargement.    TECHNIQUE: Multiplanar multisequence MRI of the cervical spine  without gadolinium contrast.    COMPARISON: None.    FINDINGS: The patient has a developmentally small central canal.  Images of the cervical cord reveals small areas of T2 hyperintensity  within the cervical cord. There is a small area of high signal  intensity at the C1 level. There is also a small area of high signal  intensity at the C6-C7 level. The area of high signal at C6-C7 is  located at an area of central spinal stenosis. This may be due to  myelomalacia. The area of high signal at C1-C2 is indeterminate.    C2-C3: Normal disc, facet joints, spinal canal and neural foramina.    C3-C4: Normal disc, facet joints, spinal canal and neural foramina.    C4-C5: Normal disc, facet joints, spinal canal and neural foramina.    C5-C6: There is degeneration of the disc. There is a mild annular  disc bulge. The central canal is mild-moderately narrowed. The  residual AP diameter of the central spinal canal measures  approximately 9 mm.    C6-C7: There is degeneration of the disc. There is loss of disc space  height. There is a diffuse annular disc bulge. There are associated  posterior osteophytes. There are severe central spinal stenosis at  this level. The residual AP diameter of the central spinal canal is  only about 6 mm. There is significant flattening of the cord. There  is high signal in the cord at this level consistent with  myelomalacia. There is  moderate to severe right foraminal stenosis  due to and uncinate spur.    C7-T1: Normal disc, facet joints, spinal canal and neural foramina.            Result Impression       IMPRESSION:  1. Severe central spinal stenosis at C6-C7 due to a developmentally  small canal and due to a bulging disc and associated posterior  osteophyte. There is flattening of the cord at this level and high  signal in the cord at this level consistent with myelomalacia.  2. There is a small, indeterminate 2-3 mm area of high signal  intensity in the cord at the C1 level. This could be due to gliosis  secondary to a previous infectious or inflammatory process.  Demyelinating disease could also have a similar appearance. Clinical  correlation suggested.    GAIL MORE MD     MRI LEFT UPPER EXTREMITY NON-JOINT WITHOUT CONTRAST, MRI RIGHT UPPER  EXTREMITY NON-JOINT WITHOUT CONTRAST April 3, 2013 1:32 PM    HISTORY: Cervicalgia. Degeneration of cervical intervertebral disc.    TECHNIQUE: Multiplanar, multisequence imaging of the brachial plexus  was performed without gadolinium contrast enhancement.    COMPARISON: None.    FINDINGS: No mass lesions are seen. No inflammatory process is  identified. The roots, trunks, branches, and divisions of both the  right and left brachial plexus appear normal. No adenopathy is seen.  The anterior scalene muscles and the middle scalene muscles appear  normal. Nodules are seen within the thyroid gland. The largest nodule  is seen in the left lobe of the thyroid. This measures about 1.8 cm  in diameter.            Result Impression       IMPRESSION:  1. Both the right and left brachial plexus regions appear normal.  2. Thyroid nodules. The largest nodule is in the left lobe of the  thyroid. It measures about 1.8 cm in diameter.       XR WRIST BILATERAL G/E 3 VIEWS    Narrative:        EXAMINATION:  1. Each hand 3 views  2. Each wrist 3 views     DATE: 5/1/2013     HISTORY: Arthropathy with concern for  rheumatoid.     FINDINGS: 3 views of each hand and 3 views of each wrist are obtained  without prior for comparison. Alignment is normal. There is no  fracture or acute bone abnormality. No distinct erosions are seen.  Some spurring of the distal radial ulnar joint is noted on the right.       Impression:     IMPRESSION:  1. No evidence of an inflammatory arthropathy involving either hand  or wrist.     VIELKA GUEVARA MD         XR FOOT BILATERAL G/E 3 VIEWS    Narrative:        EXAMINATION:  1. Each foot 3 views  2. Each ankle 3 views  3. Sacroiliac joint series     DATE: 5/1/2013     HISTORY: Arthropathy; concern for rheumatoid.     FINDINGS: No prior for comparison.     A frontal and bilateral oblique evaluation of the sacroiliac joints  shows no malalignment. Some patchy sclerosis is identified along the  central aspect of both sacroiliac joints, which is favored to be  degenerative. No distinct erosions are seen. The visualized portion  of the hip joint spaces appear maintained, with no erosive changes  identified. Mild endplate spurring is noted in the lower lumbar  spine.     3 views of each foot and 3 views of each ankle show no evidence of  acute fracture or dislocation. The metatarsal phalangeal,  tarsometatarsal and intertarsal joint spaces appear maintained. No  erosions are identified. There is no sign of acroosteolysis. The  ankle mortise and talar dome are intact bilaterally. Minimal spurring  is noted along the tip of the medial malleolus bilaterally.       Impression:     IMPRESSION:  1. Patchy sclerosis identified along the central aspect of both  sacroiliac joints, which is favored to be degenerative. No distinct  erosions are seen.  2. No evidence of an inflammatory arthropathy in either foot or ankle.     VIELKA GUEVARA MD     5/2013  CBC WITH PLATELETS DIFFERENTIAL       Component Value Range    WBC 11.3 (*) 4.0 - 11.0 10e9/L    RBC Count 4.56  3.8 - 5.2 10e12/L    Hemoglobin 11.1 (*) 11.7 - 15.7 g/dL     Hematocrit 34.3 (*) 35.0 - 47.0 %    MCV 75 (*) 78 - 100 fl    MCH 24.3 (*) 26.5 - 33.0 pg    MCHC 32.4  31.5 - 36.5 g/dL    RDW 16.1 (*) 10.0 - 15.0 %    Platelet Count 315  150 - 450 10e9/L    Diff Method Automated Method      % Neutrophils 71.6  40 - 75 %    % Lymphocytes 20.9  20 - 48 %    % Monocytes 4.3  0 - 12 %    % Eosinophils 2.8  0 - 6 %    % Basophils 0.2  0 - 2 %    % Immature Granulocytes 0.2  0 - 0.4 %    Absolute Neutrophil 8.1  1.6 - 8.3 10e9/L    Absolute Lymphocytes 2.4  0.8 - 5.3 10e9/L    Absolute Monoctyes 0.5  0.0 - 1.3 10e9/L    Absolute Eosinophils 0.3  0.0 - 0.7 10e9/L    Absolute Basophils 0.0  0.0 - 0.2 10e9/L    Abs Immature Granulocytes 0.0  0 - 0.03 10e9/L   AMYLASE       Component Value Range    Amylase 103  30 - 110 U/L   COMPREHENSIVE METABOLIC PANEL       Component Value Range    Sodium 144  133 - 144 mmol/L    Potassium 3.8  3.4 - 5.3 mmol/L    Chloride 105  94 - 109 mmol/L    Carbon Dioxide 23  20 - 32 mmol/L    Anion Gap 17  6 - 17 mmol/L    Glucose 173 (*) 60 - 99 mg/dL    Urea Nitrogen 13  5 - 24 mg/dL    Creatinine 0.83  0.52 - 1.04 mg/dL    GFR Estimate 74  >60 mL/min/1.7m2    GFR Estimate If Black 90  >60 mL/min/1.7m2    Calcium 9.7  8.5 - 10.4 mg/dL    Bilirubin Total 0.4  0.2 - 1.3 mg/dL    Albumin 4.3  3.9 - 5.1 g/dL    Protein Total 7.8  6.8 - 8.8 g/dL    Alkaline Phosphatase 66  40 - 150 U/L    ALT 36  0 - 50 U/L    AST 28  0 - 45 U/L   CK TOTAL       Component Value Range    CK Total 66  30 - 225 U/L   CRP INFLAMMATION       Component Value Range    CRP Inflammation 10.4 (*) 0.0 - 8.0 mg/L   LIPASE       Component Value Range    Lipase 353 (*) 20 - 250 U/L   ERYTHROCYTE SEDIMENTATION RATE AUTO       Component Value Range    Sed Rate 26 (*) 0 - 20 mm/h   ROUTINE UA WITH MICROSCOPIC REFLEX TO CULTURE       Component Value Range    Color Urine Yellow      Appearance Urine Slightly Cloudy      Glucose Urine 30 (*) NEG mg/dL    Bilirubin Urine Negative  NEG    Ketones  Urine 5 (*) NEG mg/dL    Specific Gravity Urine 1.026  1.003 - 1.035    Blood Urine Trace (*) NEG    pH Urine 5.0  5.0 - 7.0 pH    Protein Albumin Urine 10 (*) NEG mg/dL    Urobilinogen mg/dL Normal  0.0 - 2.0 mg/dL    Nitrite Urine Negative  NEG    Leukocyte Esterase Urine Negative  NEG    Source Midstream Urine      WBC Urine 1  0 - 2 /HPF    RBC Urine 4 (*) 0 - 2 /HPF    Squamous Epithelial /HPF Urine <1  0 - 1 /HPF    Mucous Urine Present (*) NEG /LPF    Hyaline Casts 3 (*) 0 - 2 /LPF    Calcium Oxalate Moderate (*) NEG /HPF   COMPLEMENT C3       Component Value Range    Complement C3 143  76 - 169 mg/dL   COMPLEMENT C4       Component Value Range    Complement C4 31  15 - 50 mg/dL   HEPATITIS C ANTIBODY       Component Value Range    Hepatitis C Antibody Negative  NEG   CARDIOLIPIN ANTIBODY IGG AND IGM       Component Value Range    Cardiolipin IgG Marline    0 - 15.0 GPL    Value: <15.0      Interpretation:  Negative    Cardiolipin IgM Marline    0 - 12.5 MPL    Value: <12.5      Interpretation:  Negative   LUPUS PANEL       Component Value Range    Lupus Result    NEG    Value: Negative      (Note)      COMMENTS:      The INR is normal.      APTT is normal.  1:2 Mix is not indicated.      DRVVT Screen is normal.      Thrombin time is normal.      NEGATIVE TEST; A LUPUS ANTICOAGULANT WAS NOT DETECTED IN THIS      SPECIMEN WITHIN THE LIMITS OF THE TESTING REPERTOIRE.      If the clinical picture is strongly suggestive of an antiphospholipid      syndrome, recommend anticardiolipin and beta-2-glycoprotein (IgG and      IgM) antibody tests.      Leela Franks M.D.  [phone]      5/2/2013            INR =  0.93 N = 0.86-1.14  (No additional charge)      TT = 15.7 N = 13.0-19.0 sec  (No additional charge)            APTT'S:    Seconds      Reagent =  Stago LA      Normal  =  38      Patient  =  35      1:2 Mix  =  N/A      Reference =  31-43             DILUTE MADELINE VIPER VENOM TEST:      DRVVT Screen Ratio  = 0.76 Normal = <1.21         IMMUNOGLOBULIN G SUBCLASSES       Component Value Range    IGG 1030  695 - 1620 mg/dL    IgG1 488  300 - 856 mg/dL    IgG2 325  158 - 761 mg/dL    IgG3 47  24 - 192 mg/dL    IgG4 18  11 - 86 mg/dL   SUNNY ANTIBODY PANEL       Component Value Range    Ribonucleic Protein IgG Antibody 0      Smith Antibody IgG 1      SSA (RO) Antibody IgG 4      SSB (LA) Antibody IgG 0      Scleroderma Antibody IgG 0     BETA 2 GLYCOPROTEIN ANTIBODIES IGG IGM       Component Value Range    Beta-2-Glycopro IgG 1      Beta-2-Glycopro IgM 5     CYCLIC CIT PEPT IGG       Component Value Range    Cyclic Cit Pept IgG/IgA    <20 UNITS    Value: <20      Interpretation:  Negative   DNA DOUBLE STRANDED ANTIBODIES       Component Value Range    DNA-ds    0 - 29 IU/mL    Value: <15      Interpretation:  Negative       Component      Latest Ref Rng 3/11/2014   Sodium      133 - 144 mmol/L 137   Potassium      3.4 - 5.3 mmol/L 4.6   Chloride      94 - 109 mmol/L 97   Carbon Dioxide      20 - 32 mmol/L 20   Anion Gap      6 - 17 mmol/L 20 (H)   Glucose      60 - 99 mg/dL 243 (H)   Urea Nitrogen      5 - 24 mg/dL 35 (H)   Creatinine      0.52 - 1.04 mg/dL 1.47 (H)   GFR Estimate      >60 mL/min/1.7m2 38 (L)   GFR Estimate If Black      >60 mL/min/1.7m2 46 (L)   Calcium      8.5 - 10.4 mg/dL 9.7   Bilirubin Total      0.2 - 1.3 mg/dL 0.3   Albumin      3.9 - 5.1 g/dL 4.8   Protein Total      6.8 - 8.8 g/dL 7.9   Alkaline Phosphatase      40 - 150 U/L 73   ALT      0 - 50 U/L 35   AST      0 - 45 U/L 30   Color Urine       Yellow   Appearance Urine       Clear   Glucose Urine      NEG mg/dL 500 (A)   Bilirubin Urine      NEG Negative   Ketones Urine      NEG mg/dL Negative   Specific Gravity Urine      1.003 - 1.035 1.020   Blood Urine      NEG Negative   pH Urine      5.0 - 7.0 pH 5.5   Protein Albumin Urine      NEG mg/dL Negative   Urobilinogen Urine      0.2 - 1.0 EU/dL 0.2   Nitrite Urine      NEG Negative   Leukocyte  Esterase Urine      NEG Negative   Source       Midstream Urine   WBC      4.0 - 11.0 10e9/L 14.0 (H)   RBC Count      3.8 - 5.2 10e12/L 5.02   Hemoglobin      11.7 - 15.7 g/dL 12.4   Hematocrit      35.0 - 47.0 % 37.1   MCV      78 - 100 fl 74 (L)   MCH      26.5 - 33.0 pg 24.7 (L)   MCHC      31.5 - 36.5 g/dL 33.4   RDW      10.0 - 15.0 % 15.3 (H)   Platelet Count      150 - 450 10e9/L 420       Component      Latest Ref Rng 3/25/2014   Sodium      133 - 144 mmol/L 137   Potassium      3.4 - 5.3 mmol/L 3.7   Chloride      94 - 109 mmol/L 100   Carbon Dioxide      20 - 32 mmol/L 21   Anion Gap      6 - 17 mmol/L 16   Glucose      60 - 99 mg/dL 214 (H)   Urea Nitrogen      5 - 24 mg/dL 20   Creatinine      0.52 - 1.04 mg/dL 1.02   GFR Estimate      >60 mL/min/1.7m2 58 (L)   GFR Estimate If Black      >60 mL/min/1.7m2 70   Calcium      8.5 - 10.4 mg/dL 9.5   Phosphorus      2.5 - 4.5 mg/dL 3.7   Albumin      3.9 - 5.1 g/dL 4.6     Component      Latest Ref Rng 4/30/2014   CRP Inflammation      0.0 - 8.0 mg/L 10.0 (H)   Sed Rate      0 - 20 mm/h 39 (H)   Rheumatoid Factor      <20 IU/mL <20   Glucose-6-PO4 Dehydrogenase       17.7     Component      Latest Ref Rng 6/11/2014 7/15/2014   WBC      4.0 - 11.0 10e9/L 11.0    RBC Count      3.8 - 5.2 10e12/L 4.74    Hemoglobin      11.7 - 15.7 g/dL 11.8    Hematocrit      35.0 - 47.0 % 36.1    MCV      78 - 100 fl 76 (L)    MCH      26.5 - 33.0 pg 24.9 (L)    MCHC      31.5 - 36.5 g/dL 32.7    RDW      10.0 - 15.0 % 13.9    Platelet Count      150 - 450 10e9/L 434    Diff Method       Automated Method    % Neutrophils       59.2    % Lymphocytes       31.6    % Monocytes       5.9    % Eosinophils       2.6    % Basophils       0.5    % Immature Granulocytes       0.2    Absolute Neutrophil      1.6 - 8.3 10e9/L 6.5    Absolute Lymphocytes      0.8 - 5.3 10e9/L 3.5    Absolute Monoctyes      0.0 - 1.3 10e9/L 0.7    Absolute Eosinophils      0.0 - 0.7 10e9/L 0.3    Absolute  Basophils      0.0 - 0.2 10e9/L 0.1    Abs Immature Granulocytes      0 - 0.4 10e9/L 0.0    Sodium      133 - 144 mmol/L 138 140   Potassium      3.4 - 5.3 mmol/L 3.9 3.8   Chloride      94 - 109 mmol/L 97 103   Carbon Dioxide      20 - 32 mmol/L 26 22   Anion Gap      6 - 17 mmol/L 14.9 15   Glucose      60 - 99 mg/dL 111 (H) 163 (H)   Urea Nitrogen      5 - 24 mg/dL 28 (H) 15   Creatinine      0.52 - 1.04 mg/dL 1.10 (H) 0.99   GFR Estimate      >60 mL/min/1.7m2 53 (L) 60 (L)   GFR Estimate If Black      >60 mL/min/1.7m2 64 72   Calcium      8.5 - 10.4 mg/dL 10.3 9.3   Bilirubin Total      0.2 - 1.3 mg/dL 0.6 0.2   Albumin      3.9 - 5.1 g/dL 5.1 4.2   Protein Total      6.8 - 8.8 g/dL 9.0 (H) 7.2   Alkaline Phosphatase      40 - 150 U/L 87 69   ALT      0 - 50 U/L 37 33   AST      0 - 45 U/L 40 26   Color Urine       Straw    Appearance Urine       Clear    Glucose Urine      NEG mg/dL Negative    Bilirubin Urine      NEG Negative    Ketones Urine      NEG mg/dL Negative    Specific Gravity Urine      1.003 - 1.035 1.005    Blood Urine      NEG Negative    pH Urine      5.0 - 7.0 pH 5.0    Protein Albumin Urine      NEG mg/dL Negative    Urobilinogen mg/dL      0.0 - 2.0 mg/dL Normal    Nitrite Urine      NEG Negative    Leukocyte Esterase Urine      NEG Negative    Source       Midstream Urine    Lipase      20 - 250 U/L 403 (H)    CRP Inflammation      0.0 - 8.0 mg/L <5.0    N-Terminal Pro Bnp      0 - 125 pg/mL  55   Hemoglobin A1C      4.3 - 6.0 %  7.0 (H)     Component      Latest Ref Rn 11/12/2014   Sodium      133 - 144 mmol/L 135   Potassium      3.4 - 5.3 mmol/L 3.8   Chloride      94 - 109 mmol/L 104   Carbon Dioxide      20 - 32 mmol/L 24   Anion Gap      3 - 14 mmol/L 7   Glucose      70 - 99 mg/dL 125 (H)   Urea Nitrogen      7 - 30 mg/dL 17   Creatinine      0.52 - 1.04 mg/dL 1.18 (H)   GFR Estimate      >60 mL/min/1.7m2 49 (L)   GFR Estimate If Black      >60 mL/min/1.7m2 59 (L)   Calcium      8.5  - 10.1 mg/dL 9.8   WBC      4.0 - 11.0 10e9/L 11.1 (H)   RBC Count      3.8 - 5.2 10e12/L 4.05   Hemoglobin      11.7 - 15.7 g/dL 9.8 (L)   Hematocrit      35.0 - 47.0 % 30.6 (L)   MCV      78 - 100 fl 76 (L)   MCH      26.5 - 33.0 pg 24.2 (L)   MCHC      31.5 - 36.5 g/dL 32.0   RDW      10.0 - 15.0 % 15.5 (H)   Platelet Count      150 - 450 10e9/L 484 (H)   CT CHEST PULMONARY EMBOLISM W CONTRAST 5/20/2015 4:57 PM  HISTORY: Pain. SOB. Elevated d-dimer.   TECHNIQUE: 65 mL Isovue 370. Axial images with coronal  reconstructions.  COMPARISON: None.  FINDINGS: Calcified granuloma with surrounding scarring in the  posterolateral left upper lobe. Triangular shaped opacity at the right  lung base in the lateral right middle lobe could represent some  scarring, atelectasis or infiltrate. There is also some scarring or  atelectasis in the posteromedial right lung base. Lungs otherwise  clear.  The pulmonary arteries are well opacified. No CT evidence for acute  pulmonary embolus. No aortic dissection.  Diffuse fatty infiltration of the liver.  IMPRESSION  IMPRESSION:   1. No pulmonary embolus identified.  2. Small focus of atelectasis, infiltrate or scarring in the lateral  right middle lobe.  3. Old granulomatous disease.  4. Otherwise negative.  SILVERIO VAZQUEZ MD  Component      Latest Ref Rng 3/27/2015   WBC      4.0 - 11.0 10e9/L 11.8 (H)   RBC Count      3.8 - 5.2 10e12/L 4.53   Hemoglobin      11.7 - 15.7 g/dL 11.0 (L)   Hematocrit      35.0 - 47.0 % 33.8 (L)   MCV      78 - 100 fl 75 (L)   MCH      26.5 - 33.0 pg 24.3 (L)   MCHC      31.5 - 36.5 g/dL 32.5   RDW      10.0 - 15.0 % 15.4 (H)   Platelet Count      150 - 450 10e9/L 419   Diff Method       Automated Method   % Neutrophils       75.3   % Lymphocytes       17.4   % Monocytes       4.4   % Eosinophils       2.5   % Basophils       0.2   % Immature Granulocytes       0.2   Absolute Neutrophil      1.6 - 8.3 10e9/L 8.9 (H)   Absolute Lymphocytes      0.8 - 5.3  10e9/L 2.1   Absolute Monoctyes      0.0 - 1.3 10e9/L 0.5   Absolute Eosinophils      0.0 - 0.7 10e9/L 0.3   Absolute Basophils      0.0 - 0.2 10e9/L 0.0   Abs Immature Granulocytes      0 - 0.4 10e9/L 0.0   Creatinine      0.52 - 1.04 mg/dL 1.12 (H)   GFR Estimate      >60 mL/min/1.7m2 52 (L)   GFR Estimate If Black      >60 mL/min/1.7m2 63   Iron      35 - 180 ug/dL 25 (L)   Iron Binding Cap      240 - 430 ug/dL 346   Iron Saturation Index      15 - 46 % 7 (L)   Albumin      3.4 - 5.0 g/dL 4.0   ALT      0 - 50 U/L 24   AST      0 - 45 U/L 15   CRP Inflammation      0.0 - 8.0 mg/L 28.0 (H)   Sed Rate      0 - 20 mm/h 81 (H)   Rheumatoid Factor      <20 IU/mL <20   Vitamin D Deficiency screening      30 - 75 ug/L 44   Ferritin      8 - 252 ng/mL 34     Component      Latest Ref Rng 7/29/2015   Sodium      133 - 144 mmol/L 137   Potassium      3.4 - 5.3 mmol/L 3.8   Chloride      94 - 109 mmol/L 106   Carbon Dioxide      20 - 32 mmol/L 23   Anion Gap      3 - 14 mmol/L 8   Glucose      70 - 99 mg/dL 148 (H)   Urea Nitrogen      7 - 30 mg/dL 17   Creatinine      0.52 - 1.04 mg/dL 1.02   GFR Estimate      >60 mL/min/1.7m2 58 (L)   GFR Estimate If Black      >60 mL/min/1.7m2 70   Calcium      8.5 - 10.1 mg/dL 9.0   Bilirubin Total      0.2 - 1.3 mg/dL 0.5   Albumin      3.4 - 5.0 g/dL 4.2   Protein Total      6.8 - 8.8 g/dL 7.4   Alkaline Phosphatase      40 - 150 U/L 64   ALT      0 - 50 U/L 23   AST      0 - 45 U/L 19     Results for FAVIO MARTINEZ (MRN [id number]) as of 10/30/2015 17:00   Ref. Range 8/21/2012 09:06 5/22/2013 14:22 4/14/2014 12:06 9/11/2014 12:35 12/4/2014 16:38   TSH Latest Range: 0.40-4.00 mU/L 0.83 0.43 0.27 (L) 0.23 (L) 0.22 (L)     Component      Latest Ref Rng 10/30/2015   WBC      4.0 - 11.0 10e9/L 13.4 (H)   RBC Count      3.8 - 5.2 10e12/L 4.76   Hemoglobin      11.7 - 15.7 g/dL 12.4   Hematocrit      35.0 - 47.0 % 37.9   MCV      78 - 100 fl 80   MCH      26.5 - 33.0 pg 26.1 (L)   MCHC       31.5 - 36.5 g/dL 32.7   RDW      10.0 - 15.0 % 14.5   Platelet Count      150 - 450 10e9/L 324   Diff Method       Automated Method   % Neutrophils       67.7   % Lymphocytes       22.7   % Monocytes       6.3   % Eosinophils       2.7   % Basophils       0.4   % Immature Granulocytes       0.2   Absolute Neutrophil      1.6 - 8.3 10e9/L 9.1 (H)   Absolute Lymphocytes      0.8 - 5.3 10e9/L 3.1   Absolute Monoctyes      0.0 - 1.3 10e9/L 0.9   Absolute Eosinophils      0.0 - 0.7 10e9/L 0.4   Absolute Basophils      0.0 - 0.2 10e9/L 0.1   Abs Immature Granulocytes      0 - 0.4 10e9/L 0.0   Creatinine      0.52 - 1.04 mg/dL 1.21 (H)   GFR Estimate      >60 mL/min/1.7m2 47 (L)   GFR Estimate If Black      >60 mL/min/1.7m2 57 (L)   Iron      35 - 180 ug/dL 70   Iron Binding Cap      240 - 430 ug/dL 428   Iron Saturation Index      15 - 46 % 16   Albumin      3.4 - 5.0 g/dL 4.6   ALT      0 - 50 U/L 29   AST      0 - 45 U/L 21   CRP Inflammation      0.0 - 8.0 mg/L <2.9   Sed Rate      0 - 20 mm/h 8   Vitamin D Deficiency screening      20 - 75 ug/L 46   Ferritin      8 - 252 ng/mL 18   TSH      0.40 - 4.00 mU/L 0.49   T4 Free      0.76 - 1.46 ng/dL 1.06   Free T3      2.3 - 4.2 pg/mL 2.8     Component      Latest Ref Rng 11/17/2015   Testosterone Total      8 - 60 ng/dL 19   Sex Hormone Binding Globulin      30 - 135 nmol/L 32   Free Testosterone Calculated      0.11 - 0.58 ng/dL 0.34   Vitamin A       0.61   Retinol Palmitate       <0.02 . . .   Vitamin A Interp       Normal . . .   Thyroglobulin Antibody      <40 IU/mL <20   Thyroid Peroxidase Antibody      <35 IU/mL 31   DHEA Sulfate      35 - 430 ug/dL 101   Zinc       68     Component      Latest Ref Rng 1/27/2016   Sodium      133 - 144 mmol/L 135   Potassium      3.4 - 5.3 mmol/L 4.0   Chloride      94 - 109 mmol/L 104   Carbon Dioxide      20 - 32 mmol/L 24   Anion Gap      3 - 14 mmol/L 7   Glucose      70 - 99 mg/dL 88   Urea Nitrogen      7 - 30 mg/dL 20    Creatinine      0.52 - 1.04 mg/dL 1.13 (H)   GFR Estimate      >60 mL/min/1.7m2 51 (L)   GFR Estimate If Black      >60 mL/min/1.7m2 62   Calcium      8.5 - 10.1 mg/dL 9.4   Bilirubin Total      0.2 - 1.3 mg/dL 0.7   Albumin      3.4 - 5.0 g/dL 4.3   Protein Total      6.8 - 8.8 g/dL 7.7   Alkaline Phosphatase      40 - 150 U/L 66   ALT      0 - 50 U/L 24   AST      0 - 45 U/L 18   Cholesterol      <200 mg/dL 112   Triglycerides      <150 mg/dL 100   HDL Cholesterol      >49 mg/dL 34 (L)   LDL Cholesterol Calculated      <100 mg/dL 58   Non HDL Cholesterol      <130 mg/dL 78   N-Terminal Pro Bnp      0 - 125 pg/mL 29   Hemoglobin A1C      4.3 - 6.0 % 7.0 (H)   Amylase      30 - 110 U/L 60   Lipase      73 - 393 U/L 394 (H)   Troponin I ES      0.000 - 0.045 ug/L <0.015 . . .     Component      Latest Ref Rng 2/24/2016   Angiotensin Converting Enzyme       <5 (L) . . .   Neutrophil Cytoplasmic IgG Antibody       <1:20 . . .   Sed Rate      0 - 20 mm/h 86 (H)     Copath Report      Patient Name: FAVIO MARTINEZ   MR#: [id number]   Specimen #: [id number]   Collected: 3/15/2016   Received: 3/15/2016   Reported: 3/17/2016 13:33   Ordering Phy(s): PARVEEN ENRIQUEZ     ORIGINAL REPORT FOLLOWS ADDENDUM  ADDENDUM     TO ORIGINAL REPORT   Status: Signed Out   Date Ordered:3/18/2016   Date Complete:3/18/2016   Date Reported:3/18/2016 12:06   Signed Out By: Marianne Godfrey MD     INTERPRETATION:   This addendum is done to incorporate the results of fungal (GMS) stains   done on the specimen.  Specimen is negative for fungal organisms.  The   original final diagnosis remains unchanged.     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  Negative for malignancy(Please   see microscopic description)  "    Electronically signed out by:     Marianne Godfrey MD     CLINICAL HISTORY:   right orbital mass     GROSS:   The specimen is received labeled \"right orbital biopsy\" and consists of   red-tan nodule measuring 1.5 x 0.9 x 0.6 cm with one smooth side and   opposite rough side consistent with periosteum.  The specimen is   bisected revealing homogenous pale tan fleshy cut surface.  Touch   preparations are prepared, air dried and fixed, portion of specimen is   submitted in RPMI for possible lymphoma workup [facility],   ([facility], [place], [place] ).  The remainder is entirely submitted.   (Dictated by: Marianne Godfrey MD 3/15/2016 04:45 PM)     MICROSCOPIC:     Specimen consists of portion of lacrimal gland with acute and chronic   inflammation.  Focal area of microabscess formation associated with   small areas of necrosis are also present.  Inflammation is seen   extending to the periorbital adipose tissue forming panniculitis.   Specimen is negative for malignancy.  Samples sent for immunophenotyping   to [facility], ([facility], [place], [place] ) reveals no evidence   of monoclonality or aberrant antigen expression.  A GMS (fungal) stain   is pending and results will be reported as an addendum.     CPT Codes:   A: 97759-AK6, 42145-FVIPY, SOH, 78943-GNNBZ, 02828-RNWN     TESTING LAB LOCATION:   [address]  [address] [phone]     COLLECTION SITE:   Client[facility]   [facility]            Component      Latest Ref Rng 4/29/2016   Nucleated RBCs      0 /100 0   Absolute Neutrophil      1.6 - 8.3 10e9/L 8.9 (H)   Absolute Lymphocytes      0.8 - 5.3 10e9/L 3.2   Absolute Monocytes      0.0 - 1.3 10e9/L 0.8   Absolute Eosinophils      0.0 - 0.7 10e9/L 0.2   Absolute Basophils      0.0 - 0.2 10e9/L 0.1   Abs Immature Granulocytes      0 - 0.4 10e9/L 0.1   Absolute Nucleated RBC       0.0   IGG      695 - 1620 " mg/dL 836   IgG1      300 - 856 mg/dL 280 (L)   IgG2      158 - 761 mg/dL 277   IgG3      24 - 192 mg/dL 35   IgG4      11 - 86 mg/dL 16   RNP Antibody IgG      0.0 - 0.9 AI <0.2 . . .   Smith SUNNY Antibody IgG      0.0 - 0.9 AI <0.2 . . .   SSA (Ro) (SUNNY) Antibody, IgG      0.0 - 0.9 AI <0.2 . . .   SSB (La) (SUNNY) Antibody, IgG      0.0 - 0.9 AI <0.2 . . .   Scleroderma Antibody Scl-70 SUNNY IgG      0.0 - 0.9 AI <0.2 . . .   Cholesterol      <200 mg/dL 154   Triglycerides      <150 mg/dL 220 (H)   HDL Cholesterol      >49 mg/dL 42 (L)   LDL Cholesterol Calculated      <100 mg/dL 67   Non HDL Cholesterol      <130 mg/dL 111   M Tuberculosis Result      NEG Negative   M Tuberculosis Antigen Value       0.00   Albumin      3.4 - 5.0 g/dL 4.3   ALT      0 - 50 U/L 30   AST      0 - 45 U/L 10   Complement C3      76 - 169 mg/dL 157   Complement C4      15 - 50 mg/dL 32   CRP Inflammation      0.0 - 8.0 mg/L <2.9   Sed Rate      0 - 20 mm/h 2   DNA-ds      <10 IU/mL 1   Cyclic Citrullinated Peptide Antibody, IgG      <7 U/mL 1   Rheumatoid Factor      <20 IU/mL <20   Proteinase 3 Antibody IgG      0.0 - 0.9 AI <0.2 . . .   Myeloperoxidase Antibody IgG      0.0 - 0.9 AI 2.5 (H)   Vitamin D Deficiency screening      20 - 75 ug/L 24   Hemoglobin A1C      4.3 - 6.0 % 7.9 (H)   ALLI by IFA IgG       1:40 (A) . . .     Component      Latest Ref Rng & Units 4/7/2017   WBC      4.0 - 11.0 10e9/L 11.3 (H)   RBC Count      3.8 - 5.2 10e12/L 4.77   Hemoglobin      11.7 - 15.7 g/dL 12.5   Hematocrit      35.0 - 47.0 % 38.7   MCV      78 - 100 fl 81   MCH      26.5 - 33.0 pg 26.2 (L)   MCHC      31.5 - 36.5 g/dL 32.3   RDW      10.0 - 15.0 % 13.2   Platelet Count      150 - 450 10e9/L 347   Diff Method       Automated Method   % Neutrophils      % 67.1   % Lymphocytes      % 22.9   % Monocytes      % 6.2   % Eosinophils      % 3.0   % Basophils      % 0.4   % Immature Granulocytes      % 0.4   Nucleated RBCs      0 /100 0   Absolute  Neutrophil      1.6 - 8.3 10e9/L 7.5   Absolute Lymphocytes      0.8 - 5.3 10e9/L 2.6   Absolute Monocytes      0.0 - 1.3 10e9/L 0.7   Absolute Eosinophils      0.0 - 0.7 10e9/L 0.3   Absolute Basophils      0.0 - 0.2 10e9/L 0.1   Abs Immature Granulocytes      0 - 0.4 10e9/L 0.1   Absolute Nucleated RBC       0.0   IGG      695 - 1620 mg/dL 962   IgG1      300 - 856 mg/dL Test sent to [facility]. See ARMISC.   IgG2      158 - 761 mg/dL Test sent to [facility]. See ARMISC.   IgG3      24 - 192 mg/dL Test sent to [facility]. See ARMISC.   IgG4      11 - 86 mg/dL Test sent to [facility]. See ARMISC.   Result       SEE NOTE . . .   Test Name       IGG SUBCLASSES   Send Outs Misc Test Code       49292   Send Outs Misc Test Specimen       Serum   Creatinine      0.52 - 1.04 mg/dL 1.20 (H)   GFR Estimate      >60 mL/min/1.7m2 47 (L)   GFR Estimate If Black      >60 mL/min/1.7m2 57 (L)   Creatinine Urine      mg/dL    Albumin Urine mg/L      mg/L    Albumin Urine mg/g Cr      0 - 25 mg/g Cr    Myeloperoxidase Antibody IgG      0.0 - 0.9 AI 2.9 (H)   Proteinase 3 Antibody IgG      0.0 - 0.9 AI <0.2 . . .   Neutrophil Cytoplasmic IgG Antibody       1:80 (A) . . .   Angiotensin Converting Enzyme       <5 (L) . . .   Lab Scanned Result       TPMT GENOTYPE-Scanned   Vitamin C       56   ALT      0 - 50 U/L 23   Albumin      3.4 - 5.0 g/dL 4.3   AST      0 - 45 U/L 18   CRP Inflammation      0.0 - 8.0 mg/L 3.7   Sed Rate      0 - 30 mm/h 17   Vitamin D Deficiency screening      20 - 75 ug/L 40   Hemoglobin A1C      4.3 - 6.0 %      Component      Latest Ref Rng & Units 4/26/2017   WBC      4.0 - 11.0 10e9/L 11.8 (H)   RBC Count      3.8 - 5.2 10e12/L 4.23   Hemoglobin      11.7 - 15.7 g/dL 11.2 (L)   Hematocrit      35.0 - 47.0 % 35.0   MCV      78 - 100 fl 83   MCH      26.5 - 33.0 pg 26.5   MCHC      31.5 - 36.5 g/dL 32.0   RDW      10.0 - 15.0 % 13.4   Platelet Count      150 - 450 10e9/L 304   Diff Method       Automated Method   % Neutrophils       % 66.2   % Lymphocytes      % 22.7   % Monocytes      % 6.5   % Eosinophils      % 3.9   % Basophils      % 0.4   % Immature Granulocytes      % 0.3   Nucleated RBCs      0 /100 0   Absolute Neutrophil      1.6 - 8.3 10e9/L 7.8   Absolute Lymphocytes      0.8 - 5.3 10e9/L 2.7   Absolute Monocytes      0.0 - 1.3 10e9/L 0.8   Absolute Eosinophils      0.0 - 0.7 10e9/L 0.5   Absolute Basophils      0.0 - 0.2 10e9/L 0.1   Abs Immature Granulocytes      0 - 0.4 10e9/L 0.0   Absolute Nucleated RBC       0.0   IGG      695 - 1620 mg/dL    IgG1      300 - 856 mg/dL    IgG2      158 - 761 mg/dL    IgG3      24 - 192 mg/dL    IgG4      11 - 86 mg/dL    Result          Test Name          Send Outs Misc Test Code          Send Outs Misc Test Specimen          Creatinine      0.52 - 1.04 mg/dL 1.24 (H)   GFR Estimate      >60 mL/min/1.7m2 46 (L)   GFR Estimate If Black      >60 mL/min/1.7m2 55 (L)   Creatinine Urine      mg/dL 121   Albumin Urine mg/L      mg/L <5   Albumin Urine mg/g Cr      0 - 25 mg/g Cr Unable to calculate due to low value   Myeloperoxidase Antibody IgG      0.0 - 0.9 AI    Proteinase 3 Antibody IgG      0.0 - 0.9 AI    Neutrophil Cytoplasmic IgG Antibody          Angiotensin Converting Enzyme          Lab Scanned Result          Vitamin C          ALT      0 - 50 U/L 25   Albumin      3.4 - 5.0 g/dL 4.1   AST      0 - 45 U/L 15   CRP Inflammation      0.0 - 8.0 mg/L    Sed Rate      0 - 30 mm/h    Vitamin D Deficiency screening      20 - 75 ug/L    Hemoglobin A1C      4.3 - 6.0 % 7.8 (H)   EXAMINATION: CT CHEST ABDOMEN PELVIS W/O CONTRAST, 4/7/2017 2:59 PM     TECHNIQUE: Helical CT images from the thoracic inlet through the  symphysis pubis were obtained without IV contrast.     COMPARISON: CT chest on 5/20/2015. CT abdomen pelvis on 6/11/2014     HISTORY: Granuloma of right orbit. Concern for malignancy, lymphoma.     Additional history from the EMR: 50-year-old female with rheumatoid  arthritis and  fibromyalgia. Presented on April 2016 with new right  orbital inflammatory mass, biopsied showed panniculitis without  infection or malignancy. Some intermittent swelling around her right  orbit.     FINDINGS:     Chest: Cardiac size is not enlarged. No pericardial effusion.  Calcified subcentimeter mediastinal and left hilar lymph nodes. No  thoracic adenopathy. Coronary artery calcifications/stents.  Benign-appearing coarse calcifications in the thyroid, better  described on ultrasound thyroid from 9/13/2016.     Central airways are patent. No pneumothorax or pleural effusion.  Calcified pulmonary granuloma in the posterior left upper lobe,  benign. Small focus of subpleural fibrosis and cysts along the  anterior lateral right lower lobe (image 96 series 6).     Abdomen and pelvis: Cholecystectomy. The liver, adrenal glands,  kidneys, spleen, pancreas, stomach, duodenum are without focal  abnormality on these noncontrast images.     No abdominal aortic aneurysm. No suspicious adenopathy in the abdomen  or pelvis. Bladder is intact. Calcified fibroid off the uterine  fundus. IUD in the uterus.     No focal bowel wall thickening or obstruction. No free air or free  fluid. Appendix is normal. Small periumbilical hernia.     Bones and soft tissues: No suspicious osseous lesions. Unremarkable  superficial soft tissues.         IMPRESSION: No evidence of malignancy in the chest, abdomen, or  pelvis.        I have personally reviewed the examination and initial interpretation  and I agree with the findings.     CONNIE BRICE      Component      Latest Ref Rng & Units 6/1/2017   WBC      4.0 - 11.0 10e9/L 12.0 (H)   RBC Count      3.8 - 5.2 10e12/L 5.18   Hemoglobin      11.7 - 15.7 g/dL 13.8   Hematocrit      35.0 - 47.0 % 41.0   MCV      78 - 100 fl 79   MCH      26.5 - 33.0 pg 26.6   MCHC      31.5 - 36.5 g/dL 33.7   RDW      10.0 - 15.0 % 14.8   Platelet Count      150 - 450 10e9/L 322   Diff Method       Automated  Method   % Neutrophils      % 76.7   % Lymphocytes      % 16.5   % Monocytes      % 4.6   % Eosinophils      % 1.9   % Basophils      % 0.3   Absolute Neutrophil      1.6 - 8.3 10e9/L 9.2 (H)   Absolute Lymphocytes      0.8 - 5.3 10e9/L 2.0   Absolute Monocytes      0.0 - 1.3 10e9/L 0.6   Absolute Eosinophils      0.0 - 0.7 10e9/L 0.2   Absolute Basophils      0.0 - 0.2 10e9/L 0.0   Color Urine       Yellow   Appearance Urine       Clear   Glucose Urine      NEG mg/dL Negative   Bilirubin Urine      NEG Negative   Ketones Urine      NEG mg/dL Negative   Specific Gravity Urine      1.003 - 1.035 1.010   pH Urine      5.0 - 7.0 pH 5.5   Protein Albumin Urine      NEG mg/dL Negative   Urobilinogen Urine      0.2 - 1.0 EU/dL 0.2   Nitrite Urine      NEG Negative   Blood Urine      NEG Negative   Leukocyte Esterase Urine      NEG Negative   Source       Midstream Urine   WBC Urine      0 - 2 /HPF O - 2   RBC Urine      0 - 2 /HPF O - 2   Squamous Epithelial /LPF Urine      FEW /LPF Few   Bacteria Urine      NEG /HPF Few (A)   Creatinine      0.52 - 1.04 mg/dL 1.19 (H)   GFR Estimate      >60 mL/min/1.7m2 48 (L)   GFR Estimate If Black      >60 mL/min/1.7m2 58 (L)   Protein Random Urine      g/L <0.05   Protein Total Urine g/gr Creatinine      0 - 0.2 g/g Cr Unable to calculate due to low value   Myeloperoxidase Antibody IgG      0.0 - 0.9 AI 1.9 (H)   Proteinase 3 Antibody IgG      0.0 - 0.9 AI <0.2 . . .   ALT      0 - 50 U/L 29   AST      0 - 45 U/L 18   Albumin      3.4 - 5.0 g/dL 4.6   CRP Inflammation      0.0 - 8.0 mg/L 6.1   Sed Rate      0 - 30 mm/h 13   Creatinine Urine Random      mg/dL 54   Neutrophil Cytoplasmic IgG Antibody       <1:20 . . .   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      Comparison: MRI brain 5/29/2013      Technique:   Orbits: Axial and coronal T1-weighted, and coronal T2-weighted images  obtained without intravenous contrast. Post-intravenous contrast  (using gadolinium) sagittal FLAIR, were obtained with fat-saturation,  focused on the orbits.  Brain: Axial susceptibility-weighted and FLAIR sequences were obtained  of the whole brain without intravenous contrast, and postcontrast  axial T1-weighted images were obtained through the whole brain.   Contrast: 7.5mL Gadavist     Findings:  New asymmetric enlargement of the right lacrimal gland and enhancement  of the right lacrimal gland with surrounding ill-defined crescentic T2  hyperintense signal and enhancement within the right superior  superotemporal orbit, predominantly involving the extraconal space  with slight intraconal extension. No definite associated restricted  diffusion. No discrete extraocular muscle enlargement. Normal  symmetric optic nerve signal. Cavernous sinuses and orbital apices are  normal. Globes are normal.     Whole brain images are symmetric minimal leukoaraiosis and generalized  parenchymal volume loss. Ventricles are not enlarged. No intracranial  hemorrhage, mass effect, midline shift or abnormal extra axial fluid  collection. No abnormal foci of intracranial enhancement or restricted  diffusion. Paranasal sinuses and mastoid air cells are clear.            Impression:    New abnormal enlargement of the right lacrimal gland with surrounding  ill-defined T2 hyperintense signal and enhancement centered in the  superotemporal right orbital fat, which may represent   infectious/inflammatory dacryadenitis, orbital pseudotumor, lymphoma,  carcinoma, sarcoidosis or IgG4 disease..     I have personally reviewed the examination and initial interpretation  and I agree with the findings.     PATRICIA BRANTLEY MD  ROS:  A comprehensive ROS was done, positives are per  HPI.        HISTORY REVIEW:  Past Medical History:   Diagnosis Date     Abnormal glandular Papanicolaou smear of cervix      Allergic rhinitis     Allergy, airborne subst     Arthritis      ASCVD (arteriosclerotic cardiovascular disease)      Chronic pain      Chronic pancreatitis (H)     idiopathic, Tx: PPI, antioxidants, pancreatic enzymes     Common migraine      Coronary artery disease      Costochondritis      Costochondritis      Difficulty in walking(719.7)      Ectasia, mammary duct     [facility], persistent nipple discharge     Elevated fasting glucose      Gastro-oesophageal reflux disease      Hernia      Hyperlipidemia LDL goal < 100      Hypertension goal BP (blood pressure) < 140/90     Essential hypertension     Iron deficiency anemia      Ischemic cardiomyopathy      Menorrhagia      Migraine headaches      Mild persistent asthma      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 Matias, neg w/u, Rx'd w/prednisone     Ventral hernia, unspecified, without mention of obstruction or gangrene 2012     Past Surgical History:   Procedure Laterality Date     C ECHO HEART XTHORACIC,COMPLETE, W/O DOPPLER  04    [facility] WNL, EF 60%     C/SECTION, LOW TRANSVERSE           CARDIAC SURGERY      cardiac stent- recent in  16; 4 other stents     DILATION AND CURETTAGE N/A 2016    Procedure: DILATION AND CURETTAGE;  Surgeon: Nahed Butler MD;  Location: [facility]     HC UGI ENDOSCOPY W EUS  08    Dr. Pastrana, possible chronic pancreatitis, fatty liver     [place] [facility] [place] INTRAUTERINE DEVICE N/A 2016    Procedure: INSERT INTRAUTERINE DEVICE;  Surgeon: Nahed Butler MD;  Location: [facility]     INT UTERINE FIBRIOD EMBOLIZATION  10/29/2014     LAPAROSCOPIC CHOLECYSTECTOMY  08    Dr. Arnol GRUBBS TX, CERVICAL      s/p LEEP     ORBITOTOMY Right 3/15/2016    Procedure: ORBITOTOMY;  Surgeon: Myron Cyr MD;  Location: [facility]     UPPER GI ENDOSCOPY  10/21/08    mild gastritis, Dr. Hidalgo     Family History   Problem Relation Age of Onset     HEART DISEASE Brother 15     MI at 15, 16.      HEART DISEASE Father 50     heart attack     CEREBROVASCULAR DISEASE Father      DIABETES Father      Hypertension Father      Depression Father      C.A.D. Father      DIABETES Maternal Uncle      Hypertension Mother      Arthritis Mother      HEART DISEASE Mother      long qt syndrome     Thyroid Disease Mother      C.A.D. Mother      Hypertension Maternal Aunt      Hypertension Maternal Uncle      Arthritis Brother      he passed away, had severe arthritis at age 11     Arthritis Maternal Uncle      Eye Disorder Maternal Uncle      cataracts     Neurologic Disorder Sister      migraines     Neurologic Disorder Sister      migraines     Respiratory Son      asthma     CEREBROVASCULAR DISEASE Maternal Uncle      C.A.D. Brother      Family History Negative       neg for RA, SLE     Unknown/Adopted No family hx of      unknown neurological issues in her family, mother was adopted     Skin Cancer No family hx of      No known family hx of skin cancer     Social History     Social History     Marital status: Single     Spouse name: N/A     Number of children: 1     Years of education: N/A      Occupational History      None      Social History Main Topics     Smoking status: Former Smoker     Packs/day: 0.20     Years: 1.00     Types: Cigarettes     Quit date: 2/1/2016     Smokeless tobacco: Never Used     Alcohol use No     Drug use: No     Sexual activity: Not Currently     Other Topics Concern     Parent/Sibling W/ Cabg, Mi Or Angioplasty Before 65f 55m? Yes     Social History Narrative    Balanced Diet - sometimes    Osteoporosis Prevention Measures - Dairy servings per day: 2 servings weekly    Regular Exercise -  Yes Describe walking 4 times a week    Dental Exam - NO    Seatbelts used - Yes    Self Breast Exam - Yes    Abuse: Current or Past (Physical, Sexual or Emotional)- No    Do you have any concerns about STD's -  No    Do you feel safe in your environment - No    Guns stored in the home - No    Sunscreen used - Yes    Lipids -  YES - Date: 053102    Glucose -  YES - Date: 012804    Eye Exam - YES - Date: one year ago    Colon Cancer Screening - No    Hemoccults - NO    Pap Test -  YES - Date: 070904, remote history of LEEP    Mammography - YES - Date: last spring, would like to discuss, needs a referral to [facility]    DEXA - NO    Immunizations reviewed and up to date - Yes, last td given in 1997 or 1998     Patient Active Problem List   Diagnosis     Seasonal affective disorder (H)     Allergic rhinitis     PCOS (polycystic ovarian syndrome)     Moderate persistent asthma     Chronic pancreatitis (H)     Hypertension goal BP (blood pressure) < 140/90     Common migraine     NSTEMI (non-ST elevated myocardial infarction) (H)     Hyperlipidemia LDL goal <70     ASCVD (arteriosclerotic cardiovascular disease)     GERD (gastroesophageal reflux disease)     Ischemic cardiomyopathy     Hypertensive heart disease     Uterine leiomyoma     Iron deficiency anemia     Costochondritis     Vitamin D deficiency     Breast cancer screening     Spinal stenosis in cervical region      Fibromyalgia     Seronegative rheumatoid arthritis (H)     Type 2 diabetes, HbA1C goal < 8% (H)     Type 2 diabetes mellitus with other specified complication (H)     Hyperlipidemia associated with type 2 diabetes mellitus (H)     Hypertension associated with diabetes (H)     Overweight with body mass index (BMI) 25.0-29.9     Tobacco use disorder     Telogen effluvium     CAD S/P percutaneous coronary angioplasty     Status post coronary angiogram       Pregnancy Hx: She is . All misscarriages were in first trimester. H/o OC use in the past. 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    PMHx, FHx, SHx were reviewed, unchanged.      Outpatient Encounter Prescriptions as of 6/15/2017   Medication Sig Dispense Refill     hydrALAZINE (APRESOLINE) 25 MG tablet Take 0.5 tablets (12.5 mg) by mouth 3 times daily as needed , for systolic blood pressure >140 mmHg. (Patient taking differently: Take 12.5 mg by mouth as needed , for systolic blood pressure >140 mmHg.) 90 tablet 3     insulin pen needle (BD MARCK U/F) 32G X 4 MM USE DAILY OR AS DIRECTED 300 each 3     insulin glargine (BASAGLAR KWIKPEN) 100 UNIT/ML injection Inject 40 Units Subcutaneous daily 18 mL 3     ranitidine (ZANTAC) 150 MG tablet Take 1 tablet (150 mg) by mouth 2 times daily 60 tablet 2     azaTHIOprine (IMURAN) 50 MG tablet Take 1 tablet (50 mg) by mouth daily TAKE BY MOUTH WITH FOOD. 30 tablet 2     predniSONE (DELTASONE) 10 MG tablet 40-30-20-10 mg a day each for 3 days then stop 60 tablet 1     lidocaine (XYLOCAINE) 5 % ointment Apply 1 g topically 2 times daily as needed for moderate pain 50 g 5     Ketoprofen POWD Apply 1 g topically 2 times daily as needed 100 g 5     traMADol (ULTRAM) 50 MG tablet Take 1 tablet (50 mg) by mouth every 8 hours as needed for moderate pain 60 tablet 3     fluconazole (DIFLUCAN) 100 MG tablet Take 100 mg by mouth daily as needed       hydroxychloroquine (PLAQUENIL) 200 MG tablet Take 2 tablets (400  mg) by mouth daily EYE EXAM DUE/LETTER SENT 180 tablet 0     ferrous gluconate (FERGON) 324 (38 FE) MG tablet Take 1 tablet (324 mg) by mouth 2 times daily 180 tablet 1     ASPIRIN LOW DOSE 81 MG EC tablet TAKE 1 TABLET BY MOUTH EVERY DAY 90 tablet 3     blood glucose monitoring (ONE TOUCH DELICA) lancets Use to test blood sugars 4 times daily or as directed. 4 Box 3     vitamin D (ERGOCALCIFEROL) 25966 UNIT capsule Take 1 capsule (50,000 Units) by mouth every 7 days Need a Vitamin D level in 2 months. (Patient taking differently: Take 50,000 Units by mouth every 7 days Need a Vitamin D level in 2 months.) 8 capsule 0     metoprolol (TOPROL-XL) 100 MG 24 hr tablet Take 1 tablet (100 mg) by mouth daily 90 tablet 3     clopidogrel (PLAVIX) 75 MG tablet Take 1 tablet (75 mg) by mouth daily 90 tablet 3     lisinopril-hydrochlorothiazide (PRINZIDE/ZESTORETIC) 20-25 MG per tablet TAKE 1 TABLET BY MOUTH DAILY 90 tablet 3     blood glucose monitoring (NO BRAND SPECIFIED) meter device kit Use to test blood sugar 4 X times daily or as directed. 1 kit 0     blood glucose monitoring (NO BRAND SPECIFIED) test strip 1 strip by In Vitro route 4 times daily Test as directed. Please dispense three months and three months of refills. Thank you. 360 each 3     diphenhydrAMINE (BENADRYL) 25 MG capsule TK 1 TO 2 CS PO QHS  4     EPIPEN 2-RIKY 0.3 MG/0.3ML injection INJECT 0.3 MG INTO THE MUSCLE PRF ANAPHYALAXIS  0     AEROSPAN 80 MCG/ACT AERS INHALE 2 PUFFS PO BID.  RINSE MOUTH AFTERWARDS  4     fluticasone (FLONASE) 50 MCG/ACT spray U 2 SPRAYS IEN D.  3     Magnesium Oxide -Mg Supplement 250 MG TABS TK 1 T PO BID. REDUCE IF STOOLS LOOSEN  11     nystatin (MYCOSTATIN) 668842 UNITS TABS tablet TK 2 TS PO BID  0     albuterol (2.5 MG/3ML) 0.083% neb solution INL 1 VIAL VIA NEBULIZATION Q 4 TO 6 HOURS PRN  1     dicyclomine (BENTYL) 20 MG tablet Take 1 tablet (20 mg) by mouth 4 times daily as needed 60 tablet 5     sucralfate (CARAFATE) 1  GM tablet Take 1 tablet (1 g) by mouth 4 times daily as needed 120 tablet 3     azelastine (ASTELIN) 0.1 % spray Spray 2 sprays into both nostrils 2 times daily 1 Bottle 3     fluocinolone (SYNALAR) 0.01 % solution Apply topically daily as needed       mometasone (NASONEX) 50 MCG/ACT spray Spray 2 sprays into both nostrils daily       cyclobenzaprine (FLEXERIL) 10 MG tablet Take 1 tablet (10 mg) by mouth 2 times daily as needed for muscle spasms 180 tablet 0     Ondansetron HCl (ZOFRAN PO)        pravastatin (PRAVACHOL) 40 MG tablet Take 1 tablet (40 mg) by mouth daily 30 tablet 11     spironolactone (ALDACTONE) 25 MG tablet Take 2 tablets (50 mg) by mouth daily 60 tablet 11     metFORMIN (GLUCOPHAGE-XR) 500 MG 24 hr tablet Take 2 tablets (1,000 mg) by mouth daily (with dinner) 60 tablet 11     montelukast (SINGULAIR) 10 MG tablet Take 1 tablet (10 mg) by mouth At Bedtime 30 tablet 1     ESOMEPRAZOLE MAGNESIUM PO Take 20 mg by mouth 2 times daily (before meals)       acetaminophen (TYLENOL) 325 MG tablet Take 1-2 tablets (325-650 mg) by mouth every 6 hours as needed for pain (headache) 250 tablet 0     metroNIDAZOLE (NORITATE) 1 % cream Apply topically daily       desonide (DESOWEN) 0.05 % cream Apply topically 2 times daily       ketoconazole (NIZORAL) 2 % shampoo Apply topically daily as needed       fluocinonide (LIDEX) 0.05 % external solution Apply topically 2 times daily To areas of itch on the scalp as needed. 60 mL 11     nitroglycerin (NITROSTAT) 0.4 MG SL tablet Place 1 tablet (0.4 mg) under the tongue every 5 minutes as needed for chest pain 25 tablet 1     albuterol (PROAIR HFA, PROVENTIL HFA, VENTOLIN HFA) 108 (90 BASE) MCG/ACT inhaler Inhale 2 puffs into the lungs every 6 hours as needed for shortness of breath / dyspnea or wheezing 3 Inhaler 1     calcium carbonate (TUMS) 500 MG chewable tablet Take 3-4 chew tab by mouth daily as needed.       fexofenadine (ALLEGRA) 180 MG tablet Take 1 tablet by  "mouth daily as needed. 90 tablet 3     olopatadine (PATANOL) 0.1 % ophthalmic solution Place 1 drop into both eyes 2 times daily as needed for allergies. 5 mL 12     [DISCONTINUED] sucralfate (CARAFATE) 1 GM tablet Take 1 tablet (1 g) by mouth 4 times daily as needed 40 tablet 1     No facility-administered encounter medications on file as of 6/15/2017.        Allergies   Allergen Reactions     Amoxicillin-Pot Clavulanate      Augmentin      Unknown possible hives and edema     Azithromycin      Diatrizoate Other (See Comments)     Pt wants this listed because she is allergic to shellfish      Imitrex [Sumatriptan]      Severe face/neck/chest tightness and flushing side effects      Penicillins Hives     Unknown      Pork Allergy      Stomach pain, cramping, diarrhea, itching, nausea and headaches     Shellfish Allergy Hives and Swelling     Sulfa Drugs Hives and Swelling     Zithromax [Azithromycin Dihydrate] Swelling     Unknown          Ph.E:    Vitals:    06/15/17 1023   BP: 120/82   Pulse: 72   SpO2: 99%   Weight: 76.2 kg (168 lb)   Height: 1.6 m (5' 3\")           Constitutional: WD/WN. Pleasant. In no acute distress. Obese.  Eyes: Swelling around R eye improved since last visit, EOMI  HEENT: No oral ulcers or thrush. Normal salivary pool.  Neck: No cervical LAP, + thyromegaly  Chest: Clear to auscultation bilaterally  CV: RRR, no murmurs/ rubs or gallops. No edema, clubbing or cyanosis.   GI: Abdomen is soft and non tender.  MS: No synovitis or joint tenderness. Cool joints. No joint deformities. Full ROM of the joints. No nodules. +Fibromyalgia trigger points.  Skin: No livedo, periungual erythema, digital ulcers or nail changes. + alopecia with scales, facial malar erythema (looks like seborrhoic dermatitis).  Neuro: A&O x 3. Grossly non focal, muscular power 5/5 in all ext  Psych: NL affect and mood    Assessment/ plan:    1-Seropositive non-erosive RA (arthritis, AM stiffness, high CRP, RF 26 but re-check " neg 3/2015 on HCQ) Dx 5/2013, FMS, new pleuritic CP 3/20-15-5/2015 resolved on steroids. She is on  mg bid since 5/2014. She tolerates it well and it's helping some but not enough to control all her pain. Continues having flare ups of joint pain, swelling also has FMS. Joint sx are getting worse. Had high ESR/CRP in 3/2015 and new pleuritic CP between 3/2015-5/2015 which resolved after taking medrol dose pack prescribed 5/20/2015. Her pleuritic CP could be related to her RA. Then stopped tramadol as it blames it for recent episodes of CP.    In the past, MTX was recommended, she declined.    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 Etiology of mass unclear, but it's inflammatory and related to pt's underlying autoimmune disease (inflammatory arthritis, serositis). 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    Her work up showed borderline + ALLI and slightly elevated MPO. I told her prednisone is not good for her diabetes and weight gain. Recommended a trial of MTX as next step. Discussed risks on details. I called her neuro-ophthalmologist at last visit who agreed with trial of MTX (she suspects pseudo-sarcoidosis or sarcoid like disease but no granuloma and ACE not elevated).     Unfortunately despite all my efforts to explain risks vs benefits (more than risks) of MTX as steroid sparing gent, Janine declined to try MTX. I was very concerned about her R orbital inflammatory mass as there is high chance of flare up off steroids and steroid causes her weigh gain and uncontrolled diabetes. I even offered her other steroid sparing gents like imuran. She declined that as well.    Her inflammation around R orbit has recurred now. On 4/7/2017, I spent quite amount of time discussing a trial  of MTX. After discussing risks/benefits, she agreed to try it.     She is s/p Tx with MTX 10 mg po qwk 4/2017-5/2017. No benefits and more GI SEs, more hair loss and headaches since start of MTX. No benefits. I called and spoke with her neuro-ophthalmologist who recommended a second opinion from neuro-ophthalmology at the . I suspect her presentation to be ANCA vasculitis related but wanted to repeat imaging and get neuro-ophthalmology eval here. She was recommended to have repeat biopsy to r/o lymphoma.    In 5/2017, prescribed her prednisone 40-30-20-10 mg a day each for 3 days then stop (she declined higher dose and longer taper despite my concern for worsening swelling around orbit), Her R campos-orbital edema significantly improved. MTX was stopped in 5/2017 given side effects. Plan was to start imuran 50 mg po qd (NL TPMT); however we decided to hold off till she gets biopsy done.    PLAN:    Agree with biopsy of R per-orbital mass, consider imuran if it supports Dx of ANCA-vasculitis.    CT chest/abdomen/pelvis 4/2017 was neg for malignancy. 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    Continue accupuncture.     My impression is that her arthralgias are a combination of IA, fibromyalgia and chronic pain.  She does not think HCQ is beneficial and wants to stop, OK to stop but resume if arthralgia gets worse then would need annual eye exam    2-Fad pads. She was seen by endocrinology and cushing was ruled out in 4/2014. Was advised to do f/u for enlarged thyroid/thyroid nodules.    3-Hair loss. F/U with dermatology    4-Chronic pain. F/U with pain clinic    5-Concerns of subclinical hyperthyroidism: TSH is barely minimal, chart review shows that those lowest levels are since April 2014. At last visit, discussed Endocrinology ref which pt wants to hold off in this visit.     6-Vit D deficiency. Was replaced with vit D 50, 000 units po qwk x 12 wk then 2000 units qd    7-She wants to meet with  another pharmacist to go over meds, side effects and drug-drug interactions. Desiree RN is going to help her to set that up.    8-Oral thrush and vaginal yeast infection after recent prednisone intake. Fluconazole 200 mg a day x 10 days.  PLAN: Follow up in 4-5 months    MEDICATION CHANGES: none      Orders Placed This Encounter   Procedures     Other Specialty Referral               HPI      ROS      Physical Exam              HPI      ROS      Physical Exam

## 2017-06-15 NOTE — LETTER
6/15/2017      RE: Janine Cornell  [address] [facility] [id number]       Rheumatology F/U Note    Last visit note: 5/5/2017        Today's visit date: 6/15/2017    Reason for visit: RA, Fibromyalgia     HPI from last visit    Ms. Cornell is a 51 yo AAF who was referred to our clinic for evaluation and management of her joint pain in setting of positive RF 26.    Her joint symptoms first started more than a year ago, but over last 6-8 months fluctuating some good and bad days . All her joints are involved. Over last 5 months, hands became swollen, warm and painful. Tylenol does not help. Ketoprofen did not help. Was told to avoid NSAIDs given ACS. Reports AM/PM stiffness x 2-3 hr, can't make full fist when wakes up in AM.    She feels tired all the time. Reports worsening hair loss and unchanged  facial rash. Gets red sore flaky rash over cheeks across her face which is intermittent diagnosed Rosacea and is prescribed metronidazole gel which she has not started using. Reports occasional oral ulcers. Hands feel cold with red discoloration last winter. Has occasional dysphagia to both solids and liquid. Has dry eyes, is using OTC allergy eyedrops. Has chronic abdominal pain. Has h/o pancreatitis. Sometime has anxiety. Occasionally has numbness, tingling in fingers/toes. Has back and spine issues, her whole back and neck hurts, different areas at different time. She is wondering if she has FMS. Has hard time sleeping, has never been diagnosed with BRENNA. She does not know if he snores. She was found to have slightly positive RF in 2/2013. Has microcytic anemia.     Her arthralgia gets worse with drop in temperature. Reports body ache. Activity makes her pain worse. Her joint swelling isintermittent. Her body pain and joint pain is the same. Reports AM stiffness x 3 hr.    She has been doing acupuncture x few months and it is helping with her pain. She thinks that the combination of plaquenil and acupuncture  is helpful but overall she notice 30% in her symptoms relief.     Takes tylenol and tramadol on occasion for pain.    She decided to use different formulation of metformin which helped with her abdominal pain. I referred her to GI for evaluation of elevated lipase and abdominal pain. She decided to see GI in the future.       She is on  mg qd since 5/2014, tolerates it well and it helps her some. Denies taking any gabapentin due to chest pain and headches. She has had referral her for water aerobics, but she can't begin until she's healed from recent surgery in 10/2014. She thinks HCQ is helping but not enough to control all her pain, reports 2-3 hr of AM stiffness with ongoing diffuse arthralgia/myalgia along with intermittent swelling of hands. She does see her acupuncture person and it helps with her pain, has not started water aerobics yet. Has got her flu shot.       10/2015: Reports having pleuritic CP in 3/2015, was prescribed Lidoderm patches first. It did not help. Took prednisone (?dose) by her own, pain got better but it recurred off prednisone and gradually got worse to the point that she could not stand the pain anymore, was seen in ER in 5/2015, was treated with medrol dose pack which helped. Reports pain over hips, knees, ankles and fingers. Pain gets worse with walking. Reports myalgia, swelling of the arms. Pain over neck, shoulders. Has AM stiffness x 3-4 hr. Fingers swell up and become painful. Can't remember if steroid helped with joint pain. She had headaches, severe CP when she took tramadol and gabapentin and stopped taking them, sx resolved but she can't tell which one caused SEs but thinks that probably it was gabapentin. She has good days and tries to be more active but activity aggravates the pain. Headaches are intermittent. HCQ helps some not enough to control all the pain. No HCQ SEs. Had eye exam around July 2015. Flexeril helps but PCP wants to change flexeril to zanaflex,  reporting that she is NOT comfortable with the change. Acupuncture still helps an she continued to  do that. Concerned about fat pads she has in supraclavicular area, has h/o thyroid nodules. Continues having hair loss, takes iron for iron deficiency.     2/2016: She has 2 major complaints today, increased joint pain/swelling in hands/feet and recent Dx of optic neuritis in R eye, reports having similar problem about 20 yr ago, now recurred. Has a spot in R eye vision x long term, was seen by ophthalmology few days ago and was told to have optic neuritis. Gets joint pain, muscle pain. Can't  objects, hands are swollen. Knees, hips and ankles are painful. Reports more arthralgia. AM stiffness/ pain is 3-4 hr. She wants me to talk to her ophthalmologist directly.      She had botox inj for migraines which did not help her. She is going to have angiogram next wk, had to take more nitro for CP recently.       4/29/2016: She reports being on steroid taper x 3 since last visit. Reportedly, had orbit MRI, it showed swelling/inflamamtion of R lacrimal glands. She had painful swelling of her R eye, cause her headaches. Botox inj made her headaches worse. She is being dealing with this since 1/2016, with severe headaches and pain/swelling around R eye. Had biopsy in 3/2016. She is frustrated as prednisone causes her weight gain and her BG is difficult to control on it.    5/2016: At last visit, was prescribed MTX 10 mg po qwk to take along with FA 1 mg qd. She decided not to take MTX, is afraid of side effects, believes all these medications would harm her. She also believes that botox caused her inflammation around R eye. No major flare up of per-orbital inflamamtion since last visit but continues to have swelling around her R eye.      11/2016: Reports diffuse body ache, arthralgia, myalgia especially with weather change. No major flare up of campos-orbital inflammation but her face/around R eye swells up from time to  time. Reports 2 episodes of CP over past 2 mo, nitro sometimes helps and sometimes does not help. She has asthma and costochondritis and she has hard time to distinguish the origin of pain. Sometimes knees and fingers swell up. Her stiffness is sometimes all day.    4/2017:  Reports having sinusitis since last visit. Her R eye is dry and is using eyedrops to keep it moist. Reports having pain in ears. Was treated with antibiotics by PCP, it got better then it got worse. Reports catching infections easily. Then started having pressure over eyes with pain/headaches (exact start date is unknown). Pain gradually got worse and became persistent. Had sinus CT.     4/7/2017: Having a flare up of swelling, pain around her R orbit. Has not tried MTX yet.      5/2017: On MTX 10 mg po qwk since 4/7/2017, reports increased stomach pain and nausea and new headaches since start of MTX and it's not helping. Pain/swelling around R orbit is worse.    Today: She finished prednisone taper prescribed at last visit, R campos-orbital swelling significantly improved. 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    Her records were reviewed.        Component      Latest Ref Rng 2/28/2013 2/28/2013          12:14 PM 12:14 PM   WBC      4.0 - 11.0 10e9/L     RBC Count      3.8 - 5.2 10e12/L     Hemoglobin      11.7 - 15.7 g/dL     Hematocrit      35.0 - 47.0 %     MCV      78 - 100 fl     MCH      26.5 - 33.0 pg     MCHC      31.5 - 36.5 g/dL     RDW      10.0 - 15.0 %     Platelet Count      150 - 450 10e9/L     Specimen Description           Lyme Screen IgG and IgM           Vitamin D Deficiency screening      30 - 75 ug/L     Ferritin      10 - 300 ng/mL     Sed Rate      0 - 20 mm/h     ALLI Screen by EIA      <1.0     Rheumatoid Factor      0 - 14 IU/mL     CK Total      30 - 225 U/L  78   Uric  Acid      2.5 - 7.5 mg/dL 6.7      Component      Latest Ref Rng 2/28/2013          12:14 PM   WBC      4.0 - 11.0 10e9/L    RBC Count      3.8 - 5.2 10e12/L    Hemoglobin      11.7 - 15.7 g/dL    Hematocrit      35.0 - 47.0 %    MCV      78 - 100 fl    MCH      26.5 - 33.0 pg    MCHC      31.5 - 36.5 g/dL    RDW      10.0 - 15.0 %    Platelet Count      150 - 450 10e9/L    Specimen Description       Serum   Lyme Screen IgG and IgM       Test value: <0.75....Interpretation: Negative....If you highly suspect Lyme . . .   Vitamin D Deficiency screening      30 - 75 ug/L    Ferritin      10 - 300 ng/mL    Sed Rate      0 - 20 mm/h    ALLI Screen by EIA      <1.0    Rheumatoid Factor      0 - 14 IU/mL    CK Total      30 - 225 U/L    Uric Acid      2.5 - 7.5 mg/dL      Component      Latest Ref Rng 2/28/2013 2/28/2013 2/28/2013 2/28/2013          12:14 PM 12:14 PM 12:14 PM 12:14 PM   WBC      4.0 - 11.0 10e9/L       RBC Count      3.8 - 5.2 10e12/L       Hemoglobin      11.7 - 15.7 g/dL       Hematocrit      35.0 - 47.0 %       MCV      78 - 100 fl       MCH      26.5 - 33.0 pg       MCHC      31.5 - 36.5 g/dL       RDW      10.0 - 15.0 %       Platelet Count      150 - 450 10e9/L       Specimen Description             Lyme Screen IgG and IgM             Vitamin D Deficiency screening      30 - 75 ug/L       Ferritin      10 - 300 ng/mL    10   Sed Rate      0 - 20 mm/h   23 (H)    ALLI Screen by EIA      <1.0  <1.0 . . .     Rheumatoid Factor      0 - 14 IU/mL 26 (H)      CK Total      30 - 225 U/L       Uric Acid      2.5 - 7.5 mg/dL         Component      Latest Ref Rng 2/28/2013 2/28/2013          12:14 PM 12:14 PM   WBC      4.0 - 11.0 10e9/L 14.1 (H)    RBC Count      3.8 - 5.2 10e12/L 4.55    Hemoglobin      11.7 - 15.7 g/dL 10.7 (L)    Hematocrit      35.0 - 47.0 % 33.3 (L)    MCV      78 - 100 fl 73 (L)    MCH      26.5 - 33.0 pg 23.5 (L)    MCHC      31.5 - 36.5 g/dL 32.1    RDW      10.0 - 15.0 % 18.1 (H)     Platelet Count      150 - 450 10e9/L 407    Specimen Description           Lyme Screen IgG and IgM           Vitamin D Deficiency screening      30 - 75 ug/L  32   Ferritin      10 - 300 ng/mL     Sed Rate      0 - 20 mm/h     ALLI Screen by EIA      <1.0     Rheumatoid Factor      0 - 14 IU/mL     CK Total      30 - 225 U/L     Uric Acid      2.5 - 7.5 mg/dL          MRI CERVICAL SPINE WITHOUT CONTRAST 4/3/2013 12:47 PM    HISTORY: Cervicalgia. Degeneration of cervical intervertebral disc.  MRI cervical spine. Evaluate bilateral supraclavicular lymph nodes.  Clinical enlargement.    TECHNIQUE: Multiplanar multisequence MRI of the cervical spine  without gadolinium contrast.    COMPARISON: None.    FINDINGS: The patient has a developmentally small central canal.  Images of the cervical cord reveals small areas of T2 hyperintensity  within the cervical cord. There is a small area of high signal  intensity at the C1 level. There is also a small area of high signal  intensity at the C6-C7 level. The area of high signal at C6-C7 is  located at an area of central spinal stenosis. This may be due to  myelomalacia. The area of high signal at C1-C2 is indeterminate.    C2-C3: Normal disc, facet joints, spinal canal and neural foramina.    C3-C4: Normal disc, facet joints, spinal canal and neural foramina.    C4-C5: Normal disc, facet joints, spinal canal and neural foramina.    C5-C6: There is degeneration of the disc. There is a mild annular  disc bulge. The central canal is mild-moderately narrowed. The  residual AP diameter of the central spinal canal measures  approximately 9 mm.    C6-C7: There is degeneration of the disc. There is loss of disc space  height. There is a diffuse annular disc bulge. There are associated  posterior osteophytes. There are severe central spinal stenosis at  this level. The residual AP diameter of the central spinal canal is  only about 6 mm. There is significant flattening of the cord.  There  is high signal in the cord at this level consistent with  myelomalacia. There is moderate to severe right foraminal stenosis  due to and uncinate spur.    C7-T1: Normal disc, facet joints, spinal canal and neural foramina.            Result Impression       IMPRESSION:  1. Severe central spinal stenosis at C6-C7 due to a developmentally  small canal and due to a bulging disc and associated posterior  osteophyte. There is flattening of the cord at this level and high  signal in the cord at this level consistent with myelomalacia.  2. There is a small, indeterminate 2-3 mm area of high signal  intensity in the cord at the C1 level. This could be due to gliosis  secondary to a previous infectious or inflammatory process.  Demyelinating disease could also have a similar appearance. Clinical  correlation suggested.    GAIL MORE MD     MRI LEFT UPPER EXTREMITY NON-JOINT WITHOUT CONTRAST, MRI RIGHT UPPER  EXTREMITY NON-JOINT WITHOUT CONTRAST April 3, 2013 1:32 PM    HISTORY: Cervicalgia. Degeneration of cervical intervertebral disc.    TECHNIQUE: Multiplanar, multisequence imaging of the brachial plexus  was performed without gadolinium contrast enhancement.    COMPARISON: None.    FINDINGS: No mass lesions are seen. No inflammatory process is  identified. The roots, trunks, branches, and divisions of both the  right and left brachial plexus appear normal. No adenopathy is seen.  The anterior scalene muscles and the middle scalene muscles appear  normal. Nodules are seen within the thyroid gland. The largest nodule  is seen in the left lobe of the thyroid. This measures about 1.8 cm  in diameter.            Result Impression       IMPRESSION:  1. Both the right and left brachial plexus regions appear normal.  2. Thyroid nodules. The largest nodule is in the left lobe of the  thyroid. It measures about 1.8 cm in diameter.       XR WRIST BILATERAL G/E 3 VIEWS    Narrative:        EXAMINATION:  1. Each hand 3  views  2. Each wrist 3 views     DATE: 5/1/2013     HISTORY: Arthropathy with concern for rheumatoid.     FINDINGS: 3 views of each hand and 3 views of each wrist are obtained  without prior for comparison. Alignment is normal. There is no  fracture or acute bone abnormality. No distinct erosions are seen.  Some spurring of the distal radial ulnar joint is noted on the right.       Impression:     IMPRESSION:  1. No evidence of an inflammatory arthropathy involving either hand  or wrist.     VIELKA GUEVARA MD         XR FOOT BILATERAL G/E 3 VIEWS    Narrative:        EXAMINATION:  1. Each foot 3 views  2. Each ankle 3 views  3. Sacroiliac joint series     DATE: 5/1/2013     HISTORY: Arthropathy; concern for rheumatoid.     FINDINGS: No prior for comparison.     A frontal and bilateral oblique evaluation of the sacroiliac joints  shows no malalignment. Some patchy sclerosis is identified along the  central aspect of both sacroiliac joints, which is favored to be  degenerative. No distinct erosions are seen. The visualized portion  of the hip joint spaces appear maintained, with no erosive changes  identified. Mild endplate spurring is noted in the lower lumbar  spine.     3 views of each foot and 3 views of each ankle show no evidence of  acute fracture or dislocation. The metatarsal phalangeal,  tarsometatarsal and intertarsal joint spaces appear maintained. No  erosions are identified. There is no sign of acroosteolysis. The  ankle mortise and talar dome are intact bilaterally. Minimal spurring  is noted along the tip of the medial malleolus bilaterally.       Impression:     IMPRESSION:  1. Patchy sclerosis identified along the central aspect of both  sacroiliac joints, which is favored to be degenerative. No distinct  erosions are seen.  2. No evidence of an inflammatory arthropathy in either foot or ankle.     VIELKA GUEVARA MD     5/2013  CBC WITH PLATELETS DIFFERENTIAL       Component Value Range    WBC 11.3 (*) 4.0 -  11.0 10e9/L    RBC Count 4.56  3.8 - 5.2 10e12/L    Hemoglobin 11.1 (*) 11.7 - 15.7 g/dL    Hematocrit 34.3 (*) 35.0 - 47.0 %    MCV 75 (*) 78 - 100 fl    MCH 24.3 (*) 26.5 - 33.0 pg    MCHC 32.4  31.5 - 36.5 g/dL    RDW 16.1 (*) 10.0 - 15.0 %    Platelet Count 315  150 - 450 10e9/L    Diff Method Automated Method      % Neutrophils 71.6  40 - 75 %    % Lymphocytes 20.9  20 - 48 %    % Monocytes 4.3  0 - 12 %    % Eosinophils 2.8  0 - 6 %    % Basophils 0.2  0 - 2 %    % Immature Granulocytes 0.2  0 - 0.4 %    Absolute Neutrophil 8.1  1.6 - 8.3 10e9/L    Absolute Lymphocytes 2.4  0.8 - 5.3 10e9/L    Absolute Monoctyes 0.5  0.0 - 1.3 10e9/L    Absolute Eosinophils 0.3  0.0 - 0.7 10e9/L    Absolute Basophils 0.0  0.0 - 0.2 10e9/L    Abs Immature Granulocytes 0.0  0 - 0.03 10e9/L   AMYLASE       Component Value Range    Amylase 103  30 - 110 U/L   COMPREHENSIVE METABOLIC PANEL       Component Value Range    Sodium 144  133 - 144 mmol/L    Potassium 3.8  3.4 - 5.3 mmol/L    Chloride 105  94 - 109 mmol/L    Carbon Dioxide 23  20 - 32 mmol/L    Anion Gap 17  6 - 17 mmol/L    Glucose 173 (*) 60 - 99 mg/dL    Urea Nitrogen 13  5 - 24 mg/dL    Creatinine 0.83  0.52 - 1.04 mg/dL    GFR Estimate 74  >60 mL/min/1.7m2    GFR Estimate If Black 90  >60 mL/min/1.7m2    Calcium 9.7  8.5 - 10.4 mg/dL    Bilirubin Total 0.4  0.2 - 1.3 mg/dL    Albumin 4.3  3.9 - 5.1 g/dL    Protein Total 7.8  6.8 - 8.8 g/dL    Alkaline Phosphatase 66  40 - 150 U/L    ALT 36  0 - 50 U/L    AST 28  0 - 45 U/L   CK TOTAL       Component Value Range    CK Total 66  30 - 225 U/L   CRP INFLAMMATION       Component Value Range    CRP Inflammation 10.4 (*) 0.0 - 8.0 mg/L   LIPASE       Component Value Range    Lipase 353 (*) 20 - 250 U/L   ERYTHROCYTE SEDIMENTATION RATE AUTO       Component Value Range    Sed Rate 26 (*) 0 - 20 mm/h   ROUTINE UA WITH MICROSCOPIC REFLEX TO CULTURE       Component Value Range    Color Urine Yellow      Appearance Urine Slightly  Cloudy      Glucose Urine 30 (*) NEG mg/dL    Bilirubin Urine Negative  NEG    Ketones Urine 5 (*) NEG mg/dL    Specific Gravity Urine 1.026  1.003 - 1.035    Blood Urine Trace (*) NEG    pH Urine 5.0  5.0 - 7.0 pH    Protein Albumin Urine 10 (*) NEG mg/dL    Urobilinogen mg/dL Normal  0.0 - 2.0 mg/dL    Nitrite Urine Negative  NEG    Leukocyte Esterase Urine Negative  NEG    Source Midstream Urine      WBC Urine 1  0 - 2 /HPF    RBC Urine 4 (*) 0 - 2 /HPF    Squamous Epithelial /HPF Urine <1  0 - 1 /HPF    Mucous Urine Present (*) NEG /LPF    Hyaline Casts 3 (*) 0 - 2 /LPF    Calcium Oxalate Moderate (*) NEG /HPF   COMPLEMENT C3       Component Value Range    Complement C3 143  76 - 169 mg/dL   COMPLEMENT C4       Component Value Range    Complement C4 31  15 - 50 mg/dL   HEPATITIS C ANTIBODY       Component Value Range    Hepatitis C Antibody Negative  NEG   CARDIOLIPIN ANTIBODY IGG AND IGM       Component Value Range    Cardiolipin IgG Marline    0 - 15.0 GPL    Value: <15.0      Interpretation:  Negative    Cardiolipin IgM Marline    0 - 12.5 MPL    Value: <12.5      Interpretation:  Negative   LUPUS PANEL       Component Value Range    Lupus Result    NEG    Value: Negative      (Note)      COMMENTS:      The INR is normal.      APTT is normal.  1:2 Mix is not indicated.      DRVVT Screen is normal.      Thrombin time is normal.      NEGATIVE TEST; A LUPUS ANTICOAGULANT WAS NOT DETECTED IN THIS      SPECIMEN WITHIN THE LIMITS OF THE TESTING REPERTOIRE.      If the clinical picture is strongly suggestive of an antiphospholipid      syndrome, recommend anticardiolipin and beta-2-glycoprotein (IgG and      IgM) antibody tests.      Leela Franks M.D.  [phone]      5/2/2013            INR =  0.93 N = 0.86-1.14  (No additional charge)      TT = 15.7 N = 13.0-19.0 sec  (No additional charge)            APTT'S:    Seconds      Reagent =  Stago LA      Normal  =  38      Patient  =  35      1:2 Mix  =  N/A       Reference =  31-43             DILUTE MADELINE VIPER VENOM TEST:      DRVVT Screen Ratio = 0.76 Normal = <1.21         IMMUNOGLOBULIN G SUBCLASSES       Component Value Range    IGG 1030  695 - 1620 mg/dL    IgG1 488  300 - 856 mg/dL    IgG2 325  158 - 761 mg/dL    IgG3 47  24 - 192 mg/dL    IgG4 18  11 - 86 mg/dL   SUNNY ANTIBODY PANEL       Component Value Range    Ribonucleic Protein IgG Antibody 0      Smith Antibody IgG 1      SSA (RO) Antibody IgG 4      SSB (LA) Antibody IgG 0      Scleroderma Antibody IgG 0     BETA 2 GLYCOPROTEIN ANTIBODIES IGG IGM       Component Value Range    Beta-2-Glycopro IgG 1      Beta-2-Glycopro IgM 5     CYCLIC CIT PEPT IGG       Component Value Range    Cyclic Cit Pept IgG/IgA    <20 UNITS    Value: <20      Interpretation:  Negative   DNA DOUBLE STRANDED ANTIBODIES       Component Value Range    DNA-ds    0 - 29 IU/mL    Value: <15      Interpretation:  Negative       Component      Latest Ref Rng 3/11/2014   Sodium      133 - 144 mmol/L 137   Potassium      3.4 - 5.3 mmol/L 4.6   Chloride      94 - 109 mmol/L 97   Carbon Dioxide      20 - 32 mmol/L 20   Anion Gap      6 - 17 mmol/L 20 (H)   Glucose      60 - 99 mg/dL 243 (H)   Urea Nitrogen      5 - 24 mg/dL 35 (H)   Creatinine      0.52 - 1.04 mg/dL 1.47 (H)   GFR Estimate      >60 mL/min/1.7m2 38 (L)   GFR Estimate If Black      >60 mL/min/1.7m2 46 (L)   Calcium      8.5 - 10.4 mg/dL 9.7   Bilirubin Total      0.2 - 1.3 mg/dL 0.3   Albumin      3.9 - 5.1 g/dL 4.8   Protein Total      6.8 - 8.8 g/dL 7.9   Alkaline Phosphatase      40 - 150 U/L 73   ALT      0 - 50 U/L 35   AST      0 - 45 U/L 30   Color Urine       Yellow   Appearance Urine       Clear   Glucose Urine      NEG mg/dL 500 (A)   Bilirubin Urine      NEG Negative   Ketones Urine      NEG mg/dL Negative   Specific Gravity Urine      1.003 - 1.035 1.020   Blood Urine      NEG Negative   pH Urine      5.0 - 7.0 pH 5.5   Protein Albumin Urine      NEG mg/dL Negative    Urobilinogen Urine      0.2 - 1.0 EU/dL 0.2   Nitrite Urine      NEG Negative   Leukocyte Esterase Urine      NEG Negative   Source       Midstream Urine   WBC      4.0 - 11.0 10e9/L 14.0 (H)   RBC Count      3.8 - 5.2 10e12/L 5.02   Hemoglobin      11.7 - 15.7 g/dL 12.4   Hematocrit      35.0 - 47.0 % 37.1   MCV      78 - 100 fl 74 (L)   MCH      26.5 - 33.0 pg 24.7 (L)   MCHC      31.5 - 36.5 g/dL 33.4   RDW      10.0 - 15.0 % 15.3 (H)   Platelet Count      150 - 450 10e9/L 420       Component      Latest Ref Rng 3/25/2014   Sodium      133 - 144 mmol/L 137   Potassium      3.4 - 5.3 mmol/L 3.7   Chloride      94 - 109 mmol/L 100   Carbon Dioxide      20 - 32 mmol/L 21   Anion Gap      6 - 17 mmol/L 16   Glucose      60 - 99 mg/dL 214 (H)   Urea Nitrogen      5 - 24 mg/dL 20   Creatinine      0.52 - 1.04 mg/dL 1.02   GFR Estimate      >60 mL/min/1.7m2 58 (L)   GFR Estimate If Black      >60 mL/min/1.7m2 70   Calcium      8.5 - 10.4 mg/dL 9.5   Phosphorus      2.5 - 4.5 mg/dL 3.7   Albumin      3.9 - 5.1 g/dL 4.6     Component      Latest Ref Rng 4/30/2014   CRP Inflammation      0.0 - 8.0 mg/L 10.0 (H)   Sed Rate      0 - 20 mm/h 39 (H)   Rheumatoid Factor      <20 IU/mL <20   Glucose-6-PO4 Dehydrogenase       17.7     Component      Latest Ref Rng 6/11/2014 7/15/2014   WBC      4.0 - 11.0 10e9/L 11.0    RBC Count      3.8 - 5.2 10e12/L 4.74    Hemoglobin      11.7 - 15.7 g/dL 11.8    Hematocrit      35.0 - 47.0 % 36.1    MCV      78 - 100 fl 76 (L)    MCH      26.5 - 33.0 pg 24.9 (L)    MCHC      31.5 - 36.5 g/dL 32.7    RDW      10.0 - 15.0 % 13.9    Platelet Count      150 - 450 10e9/L 434    Diff Method       Automated Method    % Neutrophils       59.2    % Lymphocytes       31.6    % Monocytes       5.9    % Eosinophils       2.6    % Basophils       0.5    % Immature Granulocytes       0.2    Absolute Neutrophil      1.6 - 8.3 10e9/L 6.5    Absolute Lymphocytes      0.8 - 5.3 10e9/L 3.5    Absolute  Monoctyes      0.0 - 1.3 10e9/L 0.7    Absolute Eosinophils      0.0 - 0.7 10e9/L 0.3    Absolute Basophils      0.0 - 0.2 10e9/L 0.1    Abs Immature Granulocytes      0 - 0.4 10e9/L 0.0    Sodium      133 - 144 mmol/L 138 140   Potassium      3.4 - 5.3 mmol/L 3.9 3.8   Chloride      94 - 109 mmol/L 97 103   Carbon Dioxide      20 - 32 mmol/L 26 22   Anion Gap      6 - 17 mmol/L 14.9 15   Glucose      60 - 99 mg/dL 111 (H) 163 (H)   Urea Nitrogen      5 - 24 mg/dL 28 (H) 15   Creatinine      0.52 - 1.04 mg/dL 1.10 (H) 0.99   GFR Estimate      >60 mL/min/1.7m2 53 (L) 60 (L)   GFR Estimate If Black      >60 mL/min/1.7m2 64 72   Calcium      8.5 - 10.4 mg/dL 10.3 9.3   Bilirubin Total      0.2 - 1.3 mg/dL 0.6 0.2   Albumin      3.9 - 5.1 g/dL 5.1 4.2   Protein Total      6.8 - 8.8 g/dL 9.0 (H) 7.2   Alkaline Phosphatase      40 - 150 U/L 87 69   ALT      0 - 50 U/L 37 33   AST      0 - 45 U/L 40 26   Color Urine       Straw    Appearance Urine       Clear    Glucose Urine      NEG mg/dL Negative    Bilirubin Urine      NEG Negative    Ketones Urine      NEG mg/dL Negative    Specific Gravity Urine      1.003 - 1.035 1.005    Blood Urine      NEG Negative    pH Urine      5.0 - 7.0 pH 5.0    Protein Albumin Urine      NEG mg/dL Negative    Urobilinogen mg/dL      0.0 - 2.0 mg/dL Normal    Nitrite Urine      NEG Negative    Leukocyte Esterase Urine      NEG Negative    Source       Midstream Urine    Lipase      20 - 250 U/L 403 (H)    CRP Inflammation      0.0 - 8.0 mg/L <5.0    N-Terminal Pro Bnp      0 - 125 pg/mL  55   Hemoglobin A1C      4.3 - 6.0 %  7.0 (H)     Component      Latest Ref Rng 11/12/2014   Sodium      133 - 144 mmol/L 135   Potassium      3.4 - 5.3 mmol/L 3.8   Chloride      94 - 109 mmol/L 104   Carbon Dioxide      20 - 32 mmol/L 24   Anion Gap      3 - 14 mmol/L 7   Glucose      70 - 99 mg/dL 125 (H)   Urea Nitrogen      7 - 30 mg/dL 17   Creatinine      0.52 - 1.04 mg/dL 1.18 (H)   GFR Estimate       >60 mL/min/1.7m2 49 (L)   GFR Estimate If Black      >60 mL/min/1.7m2 59 (L)   Calcium      8.5 - 10.1 mg/dL 9.8   WBC      4.0 - 11.0 10e9/L 11.1 (H)   RBC Count      3.8 - 5.2 10e12/L 4.05   Hemoglobin      11.7 - 15.7 g/dL 9.8 (L)   Hematocrit      35.0 - 47.0 % 30.6 (L)   MCV      78 - 100 fl 76 (L)   MCH      26.5 - 33.0 pg 24.2 (L)   MCHC      31.5 - 36.5 g/dL 32.0   RDW      10.0 - 15.0 % 15.5 (H)   Platelet Count      150 - 450 10e9/L 484 (H)   CT CHEST PULMONARY EMBOLISM W CONTRAST 5/20/2015 4:57 PM  HISTORY: Pain. SOB. Elevated d-dimer.   TECHNIQUE: 65 mL Isovue 370. Axial images with coronal  reconstructions.  COMPARISON: None.  FINDINGS: Calcified granuloma with surrounding scarring in the  posterolateral left upper lobe. Triangular shaped opacity at the right  lung base in the lateral right middle lobe could represent some  scarring, atelectasis or infiltrate. There is also some scarring or  atelectasis in the posteromedial right lung base. Lungs otherwise  clear.  The pulmonary arteries are well opacified. No CT evidence for acute  pulmonary embolus. No aortic dissection.  Diffuse fatty infiltration of the liver.  IMPRESSION  IMPRESSION:   1. No pulmonary embolus identified.  2. Small focus of atelectasis, infiltrate or scarring in the lateral  right middle lobe.  3. Old granulomatous disease.  4. Otherwise negative.  SILVERIO VAZQUEZ MD  Component      Latest Ref Rng 3/27/2015   WBC      4.0 - 11.0 10e9/L 11.8 (H)   RBC Count      3.8 - 5.2 10e12/L 4.53   Hemoglobin      11.7 - 15.7 g/dL 11.0 (L)   Hematocrit      35.0 - 47.0 % 33.8 (L)   MCV      78 - 100 fl 75 (L)   MCH      26.5 - 33.0 pg 24.3 (L)   MCHC      31.5 - 36.5 g/dL 32.5   RDW      10.0 - 15.0 % 15.4 (H)   Platelet Count      150 - 450 10e9/L 419   Diff Method       Automated Method   % Neutrophils       75.3   % Lymphocytes       17.4   % Monocytes       4.4   % Eosinophils       2.5   % Basophils       0.2   % Immature Granulocytes        0.2   Absolute Neutrophil      1.6 - 8.3 10e9/L 8.9 (H)   Absolute Lymphocytes      0.8 - 5.3 10e9/L 2.1   Absolute Monoctyes      0.0 - 1.3 10e9/L 0.5   Absolute Eosinophils      0.0 - 0.7 10e9/L 0.3   Absolute Basophils      0.0 - 0.2 10e9/L 0.0   Abs Immature Granulocytes      0 - 0.4 10e9/L 0.0   Creatinine      0.52 - 1.04 mg/dL 1.12 (H)   GFR Estimate      >60 mL/min/1.7m2 52 (L)   GFR Estimate If Black      >60 mL/min/1.7m2 63   Iron      35 - 180 ug/dL 25 (L)   Iron Binding Cap      240 - 430 ug/dL 346   Iron Saturation Index      15 - 46 % 7 (L)   Albumin      3.4 - 5.0 g/dL 4.0   ALT      0 - 50 U/L 24   AST      0 - 45 U/L 15   CRP Inflammation      0.0 - 8.0 mg/L 28.0 (H)   Sed Rate      0 - 20 mm/h 81 (H)   Rheumatoid Factor      <20 IU/mL <20   Vitamin D Deficiency screening      30 - 75 ug/L 44   Ferritin      8 - 252 ng/mL 34     Component      Latest Ref Rng 7/29/2015   Sodium      133 - 144 mmol/L 137   Potassium      3.4 - 5.3 mmol/L 3.8   Chloride      94 - 109 mmol/L 106   Carbon Dioxide      20 - 32 mmol/L 23   Anion Gap      3 - 14 mmol/L 8   Glucose      70 - 99 mg/dL 148 (H)   Urea Nitrogen      7 - 30 mg/dL 17   Creatinine      0.52 - 1.04 mg/dL 1.02   GFR Estimate      >60 mL/min/1.7m2 58 (L)   GFR Estimate If Black      >60 mL/min/1.7m2 70   Calcium      8.5 - 10.1 mg/dL 9.0   Bilirubin Total      0.2 - 1.3 mg/dL 0.5   Albumin      3.4 - 5.0 g/dL 4.2   Protein Total      6.8 - 8.8 g/dL 7.4   Alkaline Phosphatase      40 - 150 U/L 64   ALT      0 - 50 U/L 23   AST      0 - 45 U/L 19     Results for FAVIO MARTINEZ (MRN [id number]) as of 10/30/2015 17:00   Ref. Range 8/21/2012 09:06 5/22/2013 14:22 4/14/2014 12:06 9/11/2014 12:35 12/4/2014 16:38   TSH Latest Range: 0.40-4.00 mU/L 0.83 0.43 0.27 (L) 0.23 (L) 0.22 (L)     Component      Latest Ref Rng 10/30/2015   WBC      4.0 - 11.0 10e9/L 13.4 (H)   RBC Count      3.8 - 5.2 10e12/L 4.76   Hemoglobin      11.7 - 15.7 g/dL 12.4   Hematocrit       35.0 - 47.0 % 37.9   MCV      78 - 100 fl 80   MCH      26.5 - 33.0 pg 26.1 (L)   MCHC      31.5 - 36.5 g/dL 32.7   RDW      10.0 - 15.0 % 14.5   Platelet Count      150 - 450 10e9/L 324   Diff Method       Automated Method   % Neutrophils       67.7   % Lymphocytes       22.7   % Monocytes       6.3   % Eosinophils       2.7   % Basophils       0.4   % Immature Granulocytes       0.2   Absolute Neutrophil      1.6 - 8.3 10e9/L 9.1 (H)   Absolute Lymphocytes      0.8 - 5.3 10e9/L 3.1   Absolute Monoctyes      0.0 - 1.3 10e9/L 0.9   Absolute Eosinophils      0.0 - 0.7 10e9/L 0.4   Absolute Basophils      0.0 - 0.2 10e9/L 0.1   Abs Immature Granulocytes      0 - 0.4 10e9/L 0.0   Creatinine      0.52 - 1.04 mg/dL 1.21 (H)   GFR Estimate      >60 mL/min/1.7m2 47 (L)   GFR Estimate If Black      >60 mL/min/1.7m2 57 (L)   Iron      35 - 180 ug/dL 70   Iron Binding Cap      240 - 430 ug/dL 428   Iron Saturation Index      15 - 46 % 16   Albumin      3.4 - 5.0 g/dL 4.6   ALT      0 - 50 U/L 29   AST      0 - 45 U/L 21   CRP Inflammation      0.0 - 8.0 mg/L <2.9   Sed Rate      0 - 20 mm/h 8   Vitamin D Deficiency screening      20 - 75 ug/L 46   Ferritin      8 - 252 ng/mL 18   TSH      0.40 - 4.00 mU/L 0.49   T4 Free      0.76 - 1.46 ng/dL 1.06   Free T3      2.3 - 4.2 pg/mL 2.8     Component      Latest Ref Rng 11/17/2015   Testosterone Total      8 - 60 ng/dL 19   Sex Hormone Binding Globulin      30 - 135 nmol/L 32   Free Testosterone Calculated      0.11 - 0.58 ng/dL 0.34   Vitamin A       0.61   Retinol Palmitate       <0.02 . . .   Vitamin A Interp       Normal . . .   Thyroglobulin Antibody      <40 IU/mL <20   Thyroid Peroxidase Antibody      <35 IU/mL 31   DHEA Sulfate      35 - 430 ug/dL 101   Zinc       68     Component      Latest Ref Rng 1/27/2016   Sodium      133 - 144 mmol/L 135   Potassium      3.4 - 5.3 mmol/L 4.0   Chloride      94 - 109 mmol/L 104   Carbon Dioxide      20 - 32 mmol/L 24   Anion Gap       3 - 14 mmol/L 7   Glucose      70 - 99 mg/dL 88   Urea Nitrogen      7 - 30 mg/dL 20   Creatinine      0.52 - 1.04 mg/dL 1.13 (H)   GFR Estimate      >60 mL/min/1.7m2 51 (L)   GFR Estimate If Black      >60 mL/min/1.7m2 62   Calcium      8.5 - 10.1 mg/dL 9.4   Bilirubin Total      0.2 - 1.3 mg/dL 0.7   Albumin      3.4 - 5.0 g/dL 4.3   Protein Total      6.8 - 8.8 g/dL 7.7   Alkaline Phosphatase      40 - 150 U/L 66   ALT      0 - 50 U/L 24   AST      0 - 45 U/L 18   Cholesterol      <200 mg/dL 112   Triglycerides      <150 mg/dL 100   HDL Cholesterol      >49 mg/dL 34 (L)   LDL Cholesterol Calculated      <100 mg/dL 58   Non HDL Cholesterol      <130 mg/dL 78   N-Terminal Pro Bnp      0 - 125 pg/mL 29   Hemoglobin A1C      4.3 - 6.0 % 7.0 (H)   Amylase      30 - 110 U/L 60   Lipase      73 - 393 U/L 394 (H)   Troponin I ES      0.000 - 0.045 ug/L <0.015 . . .     Component      Latest Ref Rng 2/24/2016   Angiotensin Converting Enzyme       <5 (L) . . .   Neutrophil Cytoplasmic IgG Antibody       <1:20 . . .   Sed Rate      0 - 20 mm/h 86 (H)     Copath Report      Patient Name: FAVIO MARTINEZ   MR#: [id number]   Specimen #: [id number]   Collected: 3/15/2016   Received: 3/15/2016   Reported: 3/17/2016 13:33   Ordering Phy(s): PARVEEN ENRIQUEZ     ORIGINAL REPORT FOLLOWS ADDENDUM  ADDENDUM     TO ORIGINAL REPORT   Status: Signed Out   Date Ordered:3/18/2016   Date Complete:3/18/2016   Date Reported:3/18/2016 12:06   Signed Out By: Marianne Godfrey MD     INTERPRETATION:   This addendum is done to incorporate the results of fungal (GMS) stains   done on the specimen.  Specimen is negative for fungal organisms.  The   original final diagnosis remains unchanged.     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  Negative for malignancy(Please   see microscopic description)     Electronically signed out by:     Marianne Godfrey MD     CLINICAL HISTORY:   right orbital mass     GROSS:   The specimen is received labeled \"right orbital biopsy\" and consists of   red-tan nodule measuring 1.5 x 0.9 x 0.6 cm with one smooth side and   opposite rough side consistent with periosteum.  The specimen is   bisected revealing homogenous pale tan fleshy cut surface.  Touch   preparations are prepared, air dried and fixed, portion of specimen is   submitted in RPMI for possible lymphoma workup [facility],   ([facility], [place], [place] ).  The remainder is entirely submitted.   (Dictated by: Marianne Godfrey MD 3/15/2016 04:45 PM)     MICROSCOPIC:     Specimen consists of portion of lacrimal gland with acute and chronic   inflammation.  Focal area of microabscess formation associated with   small areas of necrosis are also present.  Inflammation is seen   extending to the periorbital adipose tissue forming panniculitis.   Specimen is negative for malignancy.  Samples sent for immunophenotyping   to [facility], ([facility] [place], [place], [place] ) reveals no evidence   of monoclonality or aberrant antigen expression.  A GMS (fungal) stain   is pending and results will be reported as an addendum.     CPT Codes:   A: 92506-FN7, 77536-LZVZL, SOH, 05290-ZTEXK, 65024-HYMA     TESTING LAB LOCATION:   [address]  [id number] [phone]     COLLECTION SITE:   [facility]   [facility]            Component      Latest Ref Rng 4/29/2016   Nucleated RBCs      0 /100 0   Absolute Neutrophil      1.6 - 8.3 10e9/L 8.9 (H)   Absolute Lymphocytes      0.8 - 5.3 10e9/L 3.2   Absolute Monocytes      0.0 - 1.3 10e9/L 0.8   Absolute Eosinophils      0.0 - 0.7 10e9/L 0.2   Absolute Basophils      0.0 - 0.2 10e9/L 0.1   Abs Immature Granulocytes      0 - " 0.4 10e9/L 0.1   Absolute Nucleated RBC       0.0   IGG      695 - 1620 mg/dL 836   IgG1      300 - 856 mg/dL 280 (L)   IgG2      158 - 761 mg/dL 277   IgG3      24 - 192 mg/dL 35   IgG4      11 - 86 mg/dL 16   RNP Antibody IgG      0.0 - 0.9 AI <0.2 . . .   Smith SUNNY Antibody IgG      0.0 - 0.9 AI <0.2 . . .   SSA (Ro) (SUNNY) Antibody, IgG      0.0 - 0.9 AI <0.2 . . .   SSB (La) (SUNNY) Antibody, IgG      0.0 - 0.9 AI <0.2 . . .   Scleroderma Antibody Scl-70 SUNNY IgG      0.0 - 0.9 AI <0.2 . . .   Cholesterol      <200 mg/dL 154   Triglycerides      <150 mg/dL 220 (H)   HDL Cholesterol      >49 mg/dL 42 (L)   LDL Cholesterol Calculated      <100 mg/dL 67   Non HDL Cholesterol      <130 mg/dL 111   M Tuberculosis Result      NEG Negative   M Tuberculosis Antigen Value       0.00   Albumin      3.4 - 5.0 g/dL 4.3   ALT      0 - 50 U/L 30   AST      0 - 45 U/L 10   Complement C3      76 - 169 mg/dL 157   Complement C4      15 - 50 mg/dL 32   CRP Inflammation      0.0 - 8.0 mg/L <2.9   Sed Rate      0 - 20 mm/h 2   DNA-ds      <10 IU/mL 1   Cyclic Citrullinated Peptide Antibody, IgG      <7 U/mL 1   Rheumatoid Factor      <20 IU/mL <20   Proteinase 3 Antibody IgG      0.0 - 0.9 AI <0.2 . . .   Myeloperoxidase Antibody IgG      0.0 - 0.9 AI 2.5 (H)   Vitamin D Deficiency screening      20 - 75 ug/L 24   Hemoglobin A1C      4.3 - 6.0 % 7.9 (H)   ALLI by IFA IgG       1:40 (A) . . .     Component      Latest Ref Rng & Units 4/7/2017   WBC      4.0 - 11.0 10e9/L 11.3 (H)   RBC Count      3.8 - 5.2 10e12/L 4.77   Hemoglobin      11.7 - 15.7 g/dL 12.5   Hematocrit      35.0 - 47.0 % 38.7   MCV      78 - 100 fl 81   MCH      26.5 - 33.0 pg 26.2 (L)   MCHC      31.5 - 36.5 g/dL 32.3   RDW      10.0 - 15.0 % 13.2   Platelet Count      150 - 450 10e9/L 347   Diff Method       Automated Method   % Neutrophils      % 67.1   % Lymphocytes      % 22.9   % Monocytes      % 6.2   % Eosinophils      % 3.0   % Basophils      % 0.4   %  Immature Granulocytes      % 0.4   Nucleated RBCs      0 /100 0   Absolute Neutrophil      1.6 - 8.3 10e9/L 7.5   Absolute Lymphocytes      0.8 - 5.3 10e9/L 2.6   Absolute Monocytes      0.0 - 1.3 10e9/L 0.7   Absolute Eosinophils      0.0 - 0.7 10e9/L 0.3   Absolute Basophils      0.0 - 0.2 10e9/L 0.1   Abs Immature Granulocytes      0 - 0.4 10e9/L 0.1   Absolute Nucleated RBC       0.0   IGG      695 - 1620 mg/dL 962   IgG1      300 - 856 mg/dL Test sent to [facility]. See ARMISC.   IgG2      158 - 761 mg/dL Test sent to [facility]. See ARMISC.   IgG3      24 - 192 mg/dL Test sent to [facility]. See ARMISC.   IgG4      11 - 86 mg/dL Test sent to [facility]. See ARMISC.   Result       SEE NOTE . . .   Test Name       IGG SUBCLASSES   Send Outs Misc Test Code       11726   Send Outs Misc Test Specimen       Serum   Creatinine      0.52 - 1.04 mg/dL 1.20 (H)   GFR Estimate      >60 mL/min/1.7m2 47 (L)   GFR Estimate If Black      >60 mL/min/1.7m2 57 (L)   Creatinine Urine      mg/dL    Albumin Urine mg/L      mg/L    Albumin Urine mg/g Cr      0 - 25 mg/g Cr    Myeloperoxidase Antibody IgG      0.0 - 0.9 AI 2.9 (H)   Proteinase 3 Antibody IgG      0.0 - 0.9 AI <0.2 . . .   Neutrophil Cytoplasmic IgG Antibody       1:80 (A) . . .   Angiotensin Converting Enzyme       <5 (L) . . .   Lab Scanned Result       TPMT GENOTYPE-Scanned   Vitamin C       56   ALT      0 - 50 U/L 23   Albumin      3.4 - 5.0 g/dL 4.3   AST      0 - 45 U/L 18   CRP Inflammation      0.0 - 8.0 mg/L 3.7   Sed Rate      0 - 30 mm/h 17   Vitamin D Deficiency screening      20 - 75 ug/L 40   Hemoglobin A1C      4.3 - 6.0 %      Component      Latest Ref Rng & Units 4/26/2017   WBC      4.0 - 11.0 10e9/L 11.8 (H)   RBC Count      3.8 - 5.2 10e12/L 4.23   Hemoglobin      11.7 - 15.7 g/dL 11.2 (L)   Hematocrit      35.0 - 47.0 % 35.0   MCV      78 - 100 fl 83   MCH      26.5 - 33.0 pg 26.5   MCHC      31.5 - 36.5 g/dL 32.0   RDW      10.0 - 15.0 % 13.4   Platelet Count      150  - 450 10e9/L 304   Diff Method       Automated Method   % Neutrophils      % 66.2   % Lymphocytes      % 22.7   % Monocytes      % 6.5   % Eosinophils      % 3.9   % Basophils      % 0.4   % Immature Granulocytes      % 0.3   Nucleated RBCs      0 /100 0   Absolute Neutrophil      1.6 - 8.3 10e9/L 7.8   Absolute Lymphocytes      0.8 - 5.3 10e9/L 2.7   Absolute Monocytes      0.0 - 1.3 10e9/L 0.8   Absolute Eosinophils      0.0 - 0.7 10e9/L 0.5   Absolute Basophils      0.0 - 0.2 10e9/L 0.1   Abs Immature Granulocytes      0 - 0.4 10e9/L 0.0   Absolute Nucleated RBC       0.0   IGG      695 - 1620 mg/dL    IgG1      300 - 856 mg/dL    IgG2      158 - 761 mg/dL    IgG3      24 - 192 mg/dL    IgG4      11 - 86 mg/dL    Result          Test Name          Send Outs Misc Test Code          Send Outs Misc Test Specimen          Creatinine      0.52 - 1.04 mg/dL 1.24 (H)   GFR Estimate      >60 mL/min/1.7m2 46 (L)   GFR Estimate If Black      >60 mL/min/1.7m2 55 (L)   Creatinine Urine      mg/dL 121   Albumin Urine mg/L      mg/L <5   Albumin Urine mg/g Cr      0 - 25 mg/g Cr Unable to calculate due to low value   Myeloperoxidase Antibody IgG      0.0 - 0.9 AI    Proteinase 3 Antibody IgG      0.0 - 0.9 AI    Neutrophil Cytoplasmic IgG Antibody          Angiotensin Converting Enzyme          Lab Scanned Result          Vitamin C          ALT      0 - 50 U/L 25   Albumin      3.4 - 5.0 g/dL 4.1   AST      0 - 45 U/L 15   CRP Inflammation      0.0 - 8.0 mg/L    Sed Rate      0 - 30 mm/h    Vitamin D Deficiency screening      20 - 75 ug/L    Hemoglobin A1C      4.3 - 6.0 % 7.8 (H)   EXAMINATION: CT CHEST ABDOMEN PELVIS W/O CONTRAST, 4/7/2017 2:59 PM     TECHNIQUE: Helical CT images from the thoracic inlet through the  symphysis pubis were obtained without IV contrast.     COMPARISON: CT chest on 5/20/2015. CT abdomen pelvis on 6/11/2014     HISTORY: Granuloma of right orbit. Concern for malignancy, lymphoma.     Additional  history from the EMR: 50-year-old female with rheumatoid  arthritis and fibromyalgia. Presented on April 2016 with new right  orbital inflammatory mass, biopsied showed panniculitis without  infection or malignancy. Some intermittent swelling around her right  orbit.     FINDINGS:     Chest: Cardiac size is not enlarged. No pericardial effusion.  Calcified subcentimeter mediastinal and left hilar lymph nodes. No  thoracic adenopathy. Coronary artery calcifications/stents.  Benign-appearing coarse calcifications in the thyroid, better  described on ultrasound thyroid from 9/13/2016.     Central airways are patent. No pneumothorax or pleural effusion.  Calcified pulmonary granuloma in the posterior left upper lobe,  benign. Small focus of subpleural fibrosis and cysts along the  anterior lateral right lower lobe (image 96 series 6).     Abdomen and pelvis: Cholecystectomy. The liver, adrenal glands,  kidneys, spleen, pancreas, stomach, duodenum are without focal  abnormality on these noncontrast images.     No abdominal aortic aneurysm. No suspicious adenopathy in the abdomen  or pelvis. Bladder is intact. Calcified fibroid off the uterine  fundus. IUD in the uterus.     No focal bowel wall thickening or obstruction. No free air or free  fluid. Appendix is normal. Small periumbilical hernia.     Bones and soft tissues: No suspicious osseous lesions. Unremarkable  superficial soft tissues.         IMPRESSION: No evidence of malignancy in the chest, abdomen, or  pelvis.        I have personally reviewed the examination and initial interpretation  and I agree with the findings.     CONNIE BRICE      Component      Latest Ref Rng & Units 6/1/2017   WBC      4.0 - 11.0 10e9/L 12.0 (H)   RBC Count      3.8 - 5.2 10e12/L 5.18   Hemoglobin      11.7 - 15.7 g/dL 13.8   Hematocrit      35.0 - 47.0 % 41.0   MCV      78 - 100 fl 79   MCH      26.5 - 33.0 pg 26.6   MCHC      31.5 - 36.5 g/dL 33.7   RDW      10.0 - 15.0 % 14.8    Platelet Count      150 - 450 10e9/L 322   Diff Method       Automated Method   % Neutrophils      % 76.7   % Lymphocytes      % 16.5   % Monocytes      % 4.6   % Eosinophils      % 1.9   % Basophils      % 0.3   Absolute Neutrophil      1.6 - 8.3 10e9/L 9.2 (H)   Absolute Lymphocytes      0.8 - 5.3 10e9/L 2.0   Absolute Monocytes      0.0 - 1.3 10e9/L 0.6   Absolute Eosinophils      0.0 - 0.7 10e9/L 0.2   Absolute Basophils      0.0 - 0.2 10e9/L 0.0   Color Urine       Yellow   Appearance Urine       Clear   Glucose Urine      NEG mg/dL Negative   Bilirubin Urine      NEG Negative   Ketones Urine      NEG mg/dL Negative   Specific Gravity Urine      1.003 - 1.035 1.010   pH Urine      5.0 - 7.0 pH 5.5   Protein Albumin Urine      NEG mg/dL Negative   Urobilinogen Urine      0.2 - 1.0 EU/dL 0.2   Nitrite Urine      NEG Negative   Blood Urine      NEG Negative   Leukocyte Esterase Urine      NEG Negative   Source       Midstream Urine   WBC Urine      0 - 2 /HPF O - 2   RBC Urine      0 - 2 /HPF O - 2   Squamous Epithelial /LPF Urine      FEW /LPF Few   Bacteria Urine      NEG /HPF Few (A)   Creatinine      0.52 - 1.04 mg/dL 1.19 (H)   GFR Estimate      >60 mL/min/1.7m2 48 (L)   GFR Estimate If Black      >60 mL/min/1.7m2 58 (L)   Protein Random Urine      g/L <0.05   Protein Total Urine g/gr Creatinine      0 - 0.2 g/g Cr Unable to calculate due to low value   Myeloperoxidase Antibody IgG      0.0 - 0.9 AI 1.9 (H)   Proteinase 3 Antibody IgG      0.0 - 0.9 AI <0.2 . . .   ALT      0 - 50 U/L 29   AST      0 - 45 U/L 18   Albumin      3.4 - 5.0 g/dL 4.6   CRP Inflammation      0.0 - 8.0 mg/L 6.1   Sed Rate      0 - 30 mm/h 13   Creatinine Urine Random      mg/dL 54   Neutrophil Cytoplasmic IgG Antibody       <1:20 . . .   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      Comparison: MRI brain 5/29/2013      Technique:   Orbits: Axial and coronal T1-weighted, and coronal T2-weighted images  obtained without intravenous contrast. Post-intravenous contrast  (using gadolinium) sagittal FLAIR, were obtained with fat-saturation,  focused on the orbits.  Brain: Axial susceptibility-weighted and FLAIR sequences were obtained  of the whole brain without intravenous contrast, and postcontrast  axial T1-weighted images were obtained through the whole brain.   Contrast: 7.5mL Gadavist     Findings:  New asymmetric enlargement of the right lacrimal gland and enhancement  of the right lacrimal gland with surrounding ill-defined crescentic T2  hyperintense signal and enhancement within the right superior  superotemporal orbit, predominantly involving the extraconal space  with slight intraconal extension. No definite associated restricted  diffusion. No discrete extraocular muscle enlargement. Normal  symmetric optic nerve signal. Cavernous sinuses and orbital apices are  normal. Globes are normal.     Whole brain images are symmetric minimal leukoaraiosis and generalized  parenchymal volume loss. Ventricles are not enlarged. No intracranial  hemorrhage, mass effect, midline shift or abnormal extra axial fluid  collection. No abnormal foci of intracranial enhancement or restricted  diffusion. Paranasal sinuses and mastoid air cells are clear.            Impression:    New abnormal enlargement of the right lacrimal gland with surrounding  ill-defined T2 hyperintense signal and enhancement centered in the  superotemporal right orbital fat, which may represent   infectious/inflammatory dacryadenitis, orbital pseudotumor, lymphoma,  carcinoma, sarcoidosis or IgG4 disease..     I have personally reviewed the examination and initial interpretation  and I agree with the findings.     PATRICIA  MD KARON  ROS:  A comprehensive ROS was done, positives are per HPI.        HISTORY REVIEW:  Past Medical History:   Diagnosis Date     Abnormal glandular Papanicolaou smear of cervix 1992     Allergic rhinitis     Allergy, airborne subst     Arthritis      ASCVD (arteriosclerotic cardiovascular disease)      Chronic pain      Chronic pancreatitis (H)     idiopathic, Tx: PPI, antioxidants, pancreatic enzymes     Common migraine      Coronary artery disease      Costochondritis      Costochondritis      Difficulty in walking(719.7)      Ectasia, mammary duct     [facility], persistent nipple discharge     Elevated fasting glucose      Gastro-oesophageal reflux disease      Hernia      Hyperlipidemia LDL goal < 100      Hypertension goal BP (blood pressure) < 140/90     Essential hypertension     Iron deficiency anemia      Ischemic cardiomyopathy      Menorrhagia      Migraine headaches      Mild persistent asthma      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 Matias, neg w/u, Rx'd w/prednisone     Ventral hernia, unspecified, without mention of obstruction or gangrene 7/2012     Past Surgical History:   Procedure Laterality Date     C ECHO HEART XTHORACIC,COMPLETE, W/O DOPPLER  2/4/04    [facility] WNL, EF 60%     C/SECTION, LOW TRANSVERSE  11/96          CARDIAC SURGERY      cardiac stent- recent in 16; 4 other stents     DILATION AND CURETTAGE N/A 2016    Procedure: DILATION AND CURETTAGE;  Surgeon: Nahed Butler MD;  Location: [facility]     HC UGI ENDOSCOPY W EUS  08    Dr. Pastrana, possible chronic pancreatitis, fatty liver     [place]ERNIA REPAIR  2012    [place] [facility] [place]     INSERT INTRAUTERINE DEVICE N/A 2016    Procedure: INSERT INTRAUTERINE DEVICE;  Surgeon: Nahed Butler MD;  Location: [facility]     INT UTERINE FIBRIOD EMBOLIZATION  10/29/2014     LAPAROSCOPIC CHOLECYSTECTOMY  08    Dr. Arnol GRUBBS TX, CERVICAL      s/p LEEP     ORBITOTOMY Right 3/15/2016    Procedure: ORBITOTOMY;  Surgeon: Myron Cyr MD;  Location: [facility]     UPPER GI ENDOSCOPY  10/21/08    mild gastritis, Dr. Hidalgo     Family History   Problem Relation Age of Onset     HEART DISEASE Brother 15     MI at 15, 16.      HEART DISEASE Father 50     heart attack     CEREBROVASCULAR DISEASE Father      DIABETES Father      Hypertension Father      Depression Father      C.A.D. Father      DIABETES Maternal Uncle      Hypertension Mother      Arthritis Mother      HEART DISEASE Mother      long qt syndrome     Thyroid Disease Mother      C.A.D. Mother      Hypertension Maternal Aunt      Hypertension Maternal Uncle      Arthritis Brother      he passed away, had severe arthritis at age 11     Arthritis Maternal Uncle      Eye Disorder Maternal Uncle      cataracts     Neurologic Disorder Sister      migraines     Neurologic Disorder Sister      migraines     Respiratory Son      asthma     CEREBROVASCULAR DISEASE Maternal Uncle      C.A.D. Brother      Family History Negative       neg for RA, SLE     Unknown/Adopted No family hx of      unknown neurological issues in her family, mother was adopted     Skin Cancer No family hx of      No known family hx of skin cancer     Social History     Social History     Marital status: Single     Spouse  name: N/A     Number of children: 1     Years of education: N/A     Occupational History      None      Social History Main Topics     Smoking status: Former Smoker     Packs/day: 0.20     Years: 1.00     Types: Cigarettes     Quit date: 2/1/2016     Smokeless tobacco: Never Used     Alcohol use No     Drug use: No     Sexual activity: Not Currently     Other Topics Concern     Parent/Sibling W/ Cabg, Mi Or Angioplasty Before 65f 55m? Yes     Social History Narrative    Balanced Diet - sometimes    Osteoporosis Prevention Measures - Dairy servings per day: 2 servings weekly    Regular Exercise -  Yes Describe walking 4 times a week    Dental Exam - NO    Seatbelts used - Yes    Self Breast Exam - Yes    Abuse: Current or Past (Physical, Sexual or Emotional)- No    Do you have any concerns about STD's -  No    Do you feel safe in your environment - No    Guns stored in the home - No    Sunscreen used - Yes    Lipids -  YES - Date: 053102    Glucose -  YES - Date: 012804    Eye Exam - YES - Date: one year ago    Colon Cancer Screening - No    Hemoccults - NO    Pap Test -  YES - Date: 070904, remote history of LEEP    Mammography - YES - Date: last spring, would like to discuss, needs a referral to [facility] breast center    DEXA - NO    Immunizations reviewed and up to date - Yes, last td given in 1997 or 1998     Patient Active Problem List   Diagnosis     Seasonal affective disorder (H)     Allergic rhinitis     PCOS (polycystic ovarian syndrome)     Moderate persistent asthma     Chronic pancreatitis (H)     Hypertension goal BP (blood pressure) < 140/90     Common migraine     NSTEMI (non-ST elevated myocardial infarction) (H)     Hyperlipidemia LDL goal <70     ASCVD (arteriosclerotic cardiovascular disease)     GERD (gastroesophageal reflux disease)     Ischemic cardiomyopathy     Hypertensive heart disease     Uterine leiomyoma     Iron deficiency anemia     Costochondritis     Vitamin D deficiency      Breast cancer screening     Spinal stenosis in cervical region     Fibromyalgia     Seronegative rheumatoid arthritis (H)     Type 2 diabetes, HbA1C goal < 8% (H)     Type 2 diabetes mellitus with other specified complication (H)     Hyperlipidemia associated with type 2 diabetes mellitus (H)     Hypertension associated with diabetes (H)     Overweight with body mass index (BMI) 25.0-29.9     Tobacco use disorder     Telogen effluvium     CAD S/P percutaneous coronary angioplasty     Status post coronary angiogram       Pregnancy Hx: She is . All misscarriages were in first trimester. H/o OC use in the past. 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    PMHx, FHx, SHx were reviewed, unchanged.      Outpatient Encounter Prescriptions as of 6/15/2017   Medication Sig Dispense Refill     hydrALAZINE (APRESOLINE) 25 MG tablet Take 0.5 tablets (12.5 mg) by mouth 3 times daily as needed , for systolic blood pressure >140 mmHg. (Patient taking differently: Take 12.5 mg by mouth as needed , for systolic blood pressure >140 mmHg.) 90 tablet 3     insulin pen needle (BD MARCK U/F) 32G X 4 MM USE DAILY OR AS DIRECTED 300 each 3     insulin glargine (BASAGLAR KWIKPEN) 100 UNIT/ML injection Inject 40 Units Subcutaneous daily 18 mL 3     ranitidine (ZANTAC) 150 MG tablet Take 1 tablet (150 mg) by mouth 2 times daily 60 tablet 2     azaTHIOprine (IMURAN) 50 MG tablet Take 1 tablet (50 mg) by mouth daily TAKE BY MOUTH WITH FOOD. 30 tablet 2     predniSONE (DELTASONE) 10 MG tablet 40-30-20-10 mg a day each for 3 days then stop 60 tablet 1     lidocaine (XYLOCAINE) 5 % ointment Apply 1 g topically 2 times daily as needed for moderate pain 50 g 5     Ketoprofen POWD Apply 1 g topically 2 times daily as needed 100 g 5     traMADol (ULTRAM) 50 MG tablet Take 1 tablet (50 mg) by mouth every 8 hours as needed for moderate pain 60 tablet 3     fluconazole (DIFLUCAN) 100 MG tablet Take 100 mg by mouth daily as needed        hydroxychloroquine (PLAQUENIL) 200 MG tablet Take 2 tablets (400 mg) by mouth daily EYE EXAM DUE/LETTER SENT 180 tablet 0     ferrous gluconate (FERGON) 324 (38 FE) MG tablet Take 1 tablet (324 mg) by mouth 2 times daily 180 tablet 1     ASPIRIN LOW DOSE 81 MG EC tablet TAKE 1 TABLET BY MOUTH EVERY DAY 90 tablet 3     blood glucose monitoring (ONE TOUCH DELICA) lancets Use to test blood sugars 4 times daily or as directed. 4 Box 3     vitamin D (ERGOCALCIFEROL) 71339 UNIT capsule Take 1 capsule (50,000 Units) by mouth every 7 days Need a Vitamin D level in 2 months. (Patient taking differently: Take 50,000 Units by mouth every 7 days Need a Vitamin D level in 2 months.) 8 capsule 0     metoprolol (TOPROL-XL) 100 MG 24 hr tablet Take 1 tablet (100 mg) by mouth daily 90 tablet 3     clopidogrel (PLAVIX) 75 MG tablet Take 1 tablet (75 mg) by mouth daily 90 tablet 3     lisinopril-hydrochlorothiazide (PRINZIDE/ZESTORETIC) 20-25 MG per tablet TAKE 1 TABLET BY MOUTH DAILY 90 tablet 3     blood glucose monitoring (NO BRAND SPECIFIED) meter device kit Use to test blood sugar 4 X times daily or as directed. 1 kit 0     blood glucose monitoring (NO BRAND SPECIFIED) test strip 1 strip by In Vitro route 4 times daily Test as directed. Please dispense three months and three months of refills. Thank you. 360 each 3     diphenhydrAMINE (BENADRYL) 25 MG capsule TK 1 TO 2 CS PO QHS  4     EPIPEN 2-RIKY 0.3 MG/0.3ML injection INJECT 0.3 MG INTO THE MUSCLE PRF ANAPHYALAXIS  0     AEROSPAN 80 MCG/ACT AERS INHALE 2 PUFFS PO BID.  RINSE MOUTH AFTERWARDS  4     fluticasone (FLONASE) 50 MCG/ACT spray U 2 SPRAYS IEN D.  3     Magnesium Oxide -Mg Supplement 250 MG TABS TK 1 T PO BID. REDUCE IF STOOLS LOOSEN  11     nystatin (MYCOSTATIN) 748072 UNITS TABS tablet TK 2 TS PO BID  0     albuterol (2.5 MG/3ML) 0.083% neb solution INL 1 VIAL VIA NEBULIZATION Q 4 TO 6 HOURS PRN  1     dicyclomine (BENTYL) 20 MG tablet Take 1 tablet (20 mg) by  mouth 4 times daily as needed 60 tablet 5     sucralfate (CARAFATE) 1 GM tablet Take 1 tablet (1 g) by mouth 4 times daily as needed 120 tablet 3     azelastine (ASTELIN) 0.1 % spray Spray 2 sprays into both nostrils 2 times daily 1 Bottle 3     fluocinolone (SYNALAR) 0.01 % solution Apply topically daily as needed       mometasone (NASONEX) 50 MCG/ACT spray Spray 2 sprays into both nostrils daily       cyclobenzaprine (FLEXERIL) 10 MG tablet Take 1 tablet (10 mg) by mouth 2 times daily as needed for muscle spasms 180 tablet 0     Ondansetron HCl (ZOFRAN PO)        pravastatin (PRAVACHOL) 40 MG tablet Take 1 tablet (40 mg) by mouth daily 30 tablet 11     spironolactone (ALDACTONE) 25 MG tablet Take 2 tablets (50 mg) by mouth daily 60 tablet 11     metFORMIN (GLUCOPHAGE-XR) 500 MG 24 hr tablet Take 2 tablets (1,000 mg) by mouth daily (with dinner) 60 tablet 11     montelukast (SINGULAIR) 10 MG tablet Take 1 tablet (10 mg) by mouth At Bedtime 30 tablet 1     ESOMEPRAZOLE MAGNESIUM PO Take 20 mg by mouth 2 times daily (before meals)       acetaminophen (TYLENOL) 325 MG tablet Take 1-2 tablets (325-650 mg) by mouth every 6 hours as needed for pain (headache) 250 tablet 0     metroNIDAZOLE (NORITATE) 1 % cream Apply topically daily       desonide (DESOWEN) 0.05 % cream Apply topically 2 times daily       ketoconazole (NIZORAL) 2 % shampoo Apply topically daily as needed       fluocinonide (LIDEX) 0.05 % external solution Apply topically 2 times daily To areas of itch on the scalp as needed. 60 mL 11     nitroglycerin (NITROSTAT) 0.4 MG SL tablet Place 1 tablet (0.4 mg) under the tongue every 5 minutes as needed for chest pain 25 tablet 1     albuterol (PROAIR HFA, PROVENTIL HFA, VENTOLIN HFA) 108 (90 BASE) MCG/ACT inhaler Inhale 2 puffs into the lungs every 6 hours as needed for shortness of breath / dyspnea or wheezing 3 Inhaler 1     calcium carbonate (TUMS) 500 MG chewable tablet Take 3-4 chew tab by mouth daily as  "needed.       fexofenadine (ALLEGRA) 180 MG tablet Take 1 tablet by mouth daily as needed. 90 tablet 3     olopatadine (PATANOL) 0.1 % ophthalmic solution Place 1 drop into both eyes 2 times daily as needed for allergies. 5 mL 12     [DISCONTINUED] sucralfate (CARAFATE) 1 GM tablet Take 1 tablet (1 g) by mouth 4 times daily as needed 40 tablet 1     No facility-administered encounter medications on file as of 6/15/2017.        Allergies   Allergen Reactions     Amoxicillin-Pot Clavulanate      Augmentin      Unknown possible hives and edema     Azithromycin      Diatrizoate Other (See Comments)     Pt wants this listed because she is allergic to shellfish      Imitrex [Sumatriptan]      Severe face/neck/chest tightness and flushing side effects      Penicillins Hives     Unknown      Pork Allergy      Stomach pain, cramping, diarrhea, itching, nausea and headaches     Shellfish Allergy Hives and Swelling     Sulfa Drugs Hives and Swelling     Zithromax [Azithromycin Dihydrate] Swelling     Unknown          Ph.E:    Vitals:    06/15/17 1023   BP: 120/82   Pulse: 72   SpO2: 99%   Weight: 76.2 kg (168 lb)   Height: 1.6 m (5' 3\")           Constitutional: WD/WN. Pleasant. In no acute distress. Obese.  Eyes: Swelling around R eye improved since last visit, EOMI  HEENT: No oral ulcers or thrush. Normal salivary pool.  Neck: No cervical LAP, + thyromegaly  Chest: Clear to auscultation bilaterally  CV: RRR, no murmurs/ rubs or gallops. No edema, clubbing or cyanosis.   GI: Abdomen is soft and non tender.  MS: No synovitis or joint tenderness. Cool joints. No joint deformities. Full ROM of the joints. No nodules. +Fibromyalgia trigger points.  Skin: No livedo, periungual erythema, digital ulcers or nail changes. + alopecia with scales, facial malar erythema (looks like seborrhoic dermatitis).  Neuro: A&O x 3. Grossly non focal, muscular power 5/5 in all ext  Psych: NL affect and mood    Assessment/ plan:    1-Seropositive " non-erosive RA (arthritis, AM stiffness, high CRP, RF 26 but re-check neg 3/2015 on HCQ) Dx 5/2013, FMS, new pleuritic CP 3/20-15-5/2015 resolved on steroids. She is on  mg bid since 5/2014. She tolerates it well and it's helping some but not enough to control all her pain. Continues having flare ups of joint pain, swelling also has FMS. Joint sx are getting worse. Had high ESR/CRP in 3/2015 and new pleuritic CP between 3/2015-5/2015 which resolved after taking medrol dose pack prescribed 5/20/2015. Her pleuritic CP could be related to her RA. Then stopped tramadol as it blames it for recent episodes of CP.    In the past, MTX was recommended, she declined.    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 Etiology of mass unclear, but it's inflammatory and related to pt's underlying autoimmune disease (inflammatory arthritis, serositis). 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    Her work up showed borderline + ALLI and slightly elevated MPO. I told her prednisone is not good for her diabetes and weight gain. Recommended a trial of MTX as next step. Discussed risks on details. I called her neuro-ophthalmologist at last visit who agreed with trial of MTX (she suspects pseudo-sarcoidosis or sarcoid like disease but no granuloma and ACE not elevated).     Unfortunately despite all my efforts to explain risks vs benefits (more than risks) of MTX as steroid sparing gent, Janine declined to try MTX. I was very concerned about her R orbital inflammatory mass as there is high chance of flare up off steroids and steroid causes her weigh gain and uncontrolled diabetes. I even offered her other steroid sparing gents like imuran. She declined that as well.    Her inflammation around R orbit has  recurred now. On 4/7/2017, I spent quite amount of time discussing a trial of MTX. After discussing risks/benefits, she agreed to try it.     She is s/p Tx with MTX 10 mg po qwk 4/2017-5/2017. No benefits and more GI SEs, more hair loss and headaches since start of MTX. No benefits. I called and spoke with her neuro-ophthalmologist who recommended a second opinion from neuro-ophthalmology at the . I suspect her presentation to be ANCA vasculitis related but wanted to repeat imaging and get neuro-ophthalmology eval here. She was recommended to have repeat biopsy to r/o lymphoma.    In 5/2017, prescribed her prednisone 40-30-20-10 mg a day each for 3 days then stop (she declined higher dose and longer taper despite my concern for worsening swelling around orbit), Her R campos-orbital edema significantly improved. MTX was stopped in 5/2017 given side effects. Plan was to start imuran 50 mg po qd (NL TPMT); however we decided to hold off till she gets biopsy done.    PLAN:    Agree with biopsy of R per-orbital mass, consider imuran if it supports Dx of ANCA-vasculitis.    CT chest/abdomen/pelvis 4/2017 was neg for malignancy. 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    Continue accupuncture.     My impression is that her arthralgias are a combination of IA, fibromyalgia and chronic pain.  She does not think HCQ is beneficial and wants to stop, OK to stop but resume if arthralgia gets worse then would need annual eye exam    2-Fad pads. She was seen by endocrinology and cushing was ruled out in 4/2014. Was advised to do f/u for enlarged thyroid/thyroid nodules.    3-Hair loss. F/U with dermatology    4-Chronic pain. F/U with pain clinic    5-Concerns of subclinical hyperthyroidism: TSH is barely minimal, chart review shows that those lowest levels are since April 2014. At last visit, discussed Endocrinology ref which pt wants to hold off in this visit.     6-Vit D deficiency. Was replaced with vit D 50, 000  units po qwk x 12 wk then 2000 units qd    7-She wants to meet with another pharmacist to go over meds, side effects and drug-drug interactions. Desiree JACQUES is going to help her to set that up.    8-Oral thrush and vaginal yeast infection after recent prednisone intake. Fluconazole 200 mg a day x 10 days.  PLAN: Follow up in 4-5 months    MEDICATION CHANGES: none      Orders Placed This Encounter   Procedures     Other Specialty Referral       Majo Mckinnon MD

## 2017-06-15 NOTE — TELEPHONE ENCOUNTER
MTM referral from: [facility] visit (referral by provider)    MTM referral outreach attempt #1 on Faby 15, 2017 at 2:21 PM      Outcome: Spoke with patient she requested that she call us back tomorrow    Eda Gore, MTM Coordinator

## 2017-06-19 NOTE — TELEPHONE ENCOUNTER
MTM referral from: [facility]] visit (referral by provider)    MTM referral outreach attempt #2 on June 19, 2017 at 4:31 PM      Outcome: Patient not reachable after several attempts, will route to MTM Pharmacist/Provider as an FYI. Thank you for the referral.    Eda Gore, MTM Coordinator

## 2017-06-28 ENCOUNTER — PRE VISIT (OUTPATIENT)
Dept: CARDIOLOGY | Facility: CLINIC | Age: 52
End: 2017-06-28

## 2017-06-28 ENCOUNTER — OFFICE VISIT (OUTPATIENT)
Dept: CARDIOLOGY | Facility: CLINIC | Age: 52
End: 2017-06-28
Attending: INTERNAL MEDICINE
Payer: COMMERCIAL

## 2017-06-28 VITALS
BODY MASS INDEX: 30.95 KG/M2 | HEART RATE: 62 BPM | HEIGHT: 63 IN | OXYGEN SATURATION: 98 % | WEIGHT: 174.7 LBS | SYSTOLIC BLOOD PRESSURE: 125 MMHG | DIASTOLIC BLOOD PRESSURE: 81 MMHG

## 2017-06-28 DIAGNOSIS — E78.5 HYPERLIPIDEMIA LDL GOAL <70: ICD-10-CM

## 2017-06-28 DIAGNOSIS — I87.2 VENOUS INCOMPETENCE: ICD-10-CM

## 2017-06-28 DIAGNOSIS — E11.69 HYPERLIPIDEMIA ASSOCIATED WITH TYPE 2 DIABETES MELLITUS (H): ICD-10-CM

## 2017-06-28 DIAGNOSIS — E78.5 HYPERLIPIDEMIA ASSOCIATED WITH TYPE 2 DIABETES MELLITUS (H): ICD-10-CM

## 2017-06-28 DIAGNOSIS — R60.0 BILATERAL LOWER EXTREMITY EDEMA: Primary | ICD-10-CM

## 2017-06-28 DIAGNOSIS — I25.10 CORONARY ARTERY DISEASE INVOLVING NATIVE HEART, ANGINA PRESENCE UNSPECIFIED, UNSPECIFIED VESSEL OR LESION TYPE: ICD-10-CM

## 2017-06-28 LAB
ALBUMIN SERPL-MCNC: 3.7 G/DL (ref 3.4–5)
ALP SERPL-CCNC: 71 U/L (ref 40–150)
ALT SERPL W P-5'-P-CCNC: 28 U/L (ref 0–50)
ANION GAP SERPL CALCULATED.3IONS-SCNC: 7 MMOL/L (ref 3–14)
AST SERPL W P-5'-P-CCNC: 23 U/L (ref 0–45)
BILIRUB SERPL-MCNC: 0.3 MG/DL (ref 0.2–1.3)
BUN SERPL-MCNC: 14 MG/DL (ref 7–30)
CALCIUM SERPL-MCNC: 8.8 MG/DL (ref 8.5–10.1)
CHLORIDE SERPL-SCNC: 106 MMOL/L (ref 94–109)
CHOLEST SERPL-MCNC: 157 MG/DL
CO2 SERPL-SCNC: 28 MMOL/L (ref 20–32)
CREAT SERPL-MCNC: 1.01 MG/DL (ref 0.52–1.04)
GFR SERPL CREATININE-BSD FRML MDRD: 58 ML/MIN/1.7M2
GLUCOSE SERPL-MCNC: 105 MG/DL (ref 70–99)
HBA1C MFR BLD: 8.8 % (ref 4.3–6)
HDLC SERPL-MCNC: 37 MG/DL
LDLC SERPL CALC-MCNC: 77 MG/DL
NONHDLC SERPL-MCNC: 120 MG/DL
POTASSIUM SERPL-SCNC: 3.4 MMOL/L (ref 3.4–5.3)
PROT SERPL-MCNC: 7 G/DL (ref 6.8–8.8)
SODIUM SERPL-SCNC: 141 MMOL/L (ref 133–144)
TRIGL SERPL-MCNC: 214 MG/DL

## 2017-06-28 PROCEDURE — 80061 LIPID PANEL: CPT | Performed by: INTERNAL MEDICINE

## 2017-06-28 PROCEDURE — 80053 COMPREHEN METABOLIC PANEL: CPT | Performed by: INTERNAL MEDICINE

## 2017-06-28 PROCEDURE — 99213 OFFICE O/P EST LOW 20 MIN: CPT | Mod: ZP | Performed by: INTERNAL MEDICINE

## 2017-06-28 PROCEDURE — 99213 OFFICE O/P EST LOW 20 MIN: CPT | Mod: ZF

## 2017-06-28 PROCEDURE — 36415 COLL VENOUS BLD VENIPUNCTURE: CPT | Performed by: INTERNAL MEDICINE

## 2017-06-28 PROCEDURE — 83036 HEMOGLOBIN GLYCOSYLATED A1C: CPT | Performed by: INTERNAL MEDICINE

## 2017-06-28 ASSESSMENT — PAIN SCALES - GENERAL: PAINLEVEL: NO PAIN (0)

## 2017-06-28 NOTE — PATIENT INSTRUCTIONS
Take additional Spironolactone 12.5 mg by mouth once daily for 3 days if your weight increases by more than 5 pounds as discussed with Dr. Peace.     Purchase compression stockings, 10-15 mmHg, and apply one to each leg daily. Remove at night.    Return to clinic in 6 months.  Fasting labs will be needed prior to this appointment.     Please do not hesitate to call if you have any cardiology related questions or concerns, or need to schedule an appointment, at [phone].         Cardiology Medication Refill Request Information:  * Please contact your pharmacy regarding ANY request for medication refills.  ** [facility] Prescription Fax = [phone]  * Please allow 3 business days for routine medication refills.    Cardiology Test Result notification information:  *You will be notified with in 7-10 days of your appointment day regarding the results of your test. If you are on MyChart you will be notified as soon as the provider has reviewed the results and signed off on them. Please call RN Care Coordinator with questions regarding results.

## 2017-06-28 NOTE — LETTER
6/28/2017      RE: Janine Cornell  [address] [facility] [id number]       Dear Colleague,    Thank you for the opportunity to participate in the care of your patient, Janine Cornell, at the [facility]. Please see a copy of my visit note below.    HPI:     Ms Janine Cornell, who is a 52 yr -American patient with DM2, Hypothyroidism, PCOS, Dyslipidemia, HTN, and S/PNSTEMI (11/2011) s/p stent placement comes for follow-up.    Patient has a positive RA factor and fibromyalgia.  Patient feels relatively well.  She complaints much less about chest pain and SOB. She denies palpitations.  She feels still fatigued but has not done too much.  She worries about the different non-CVD issues and her discussions with the physicians who treat her non-CVD problems.  She has a lot of stress in her family - she and her son live in the house of her mother together with her mother and there is a lot of tension between her mother and her.      PAST MEDICAL HISTORY:  Past Medical History:   Diagnosis Date     Abnormal glandular Papanicolaou smear of cervix 1992     Allergic rhinitis     Allergy, airborne subst     Arthritis      ASCVD (arteriosclerotic cardiovascular disease)      Chronic pain      Chronic pancreatitis (H)     idiopathic, Tx: PPI, antioxidants, pancreatic enzymes     Common migraine      Coronary artery disease      Costochondritis      Costochondritis      Difficulty in walking(719.7)      Ectasia, mammary duct     [facility], persistent nipple discharge     Elevated fasting glucose      Gastro-oesophageal reflux disease      Hernia      Hyperlipidemia LDL goal < 100      Hypertension goal BP (blood pressure) < 140/90     Essential hypertension     Iron deficiency anemia      Ischemic cardiomyopathy      Menorrhagia      Migraine headaches      Mild persistent asthma      Neuritis optic 1997    subacute autoimmune retrobulbar  neuritis, Dr. 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 Matias, neg w/u, Rx'd w/prednisone     Ventral hernia, unspecified, without mention of obstruction or gangrene 7/2012       CURRENT MEDICATIONS:  Current Outpatient Prescriptions   Medication Sig Dispense Refill     fluconazole (DIFLUCAN) 200 MG tablet Take 1 tablet (200 mg) by mouth daily 10 tablet 0     hydrALAZINE (APRESOLINE) 25 MG tablet Take 0.5 tablets (12.5 mg) by mouth 3 times daily as needed , for systolic blood pressure >140 mmHg. (Patient taking differently: Take 12.5 mg by mouth as needed , for systolic blood pressure >140 mmHg.) 90 tablet 3     insulin pen needle (BD MARCK U/F) 32G X 4 MM USE DAILY OR AS DIRECTED 300 each 3     insulin glargine (BASAGLAR KWIKPEN) 100 UNIT/ML injection Inject 40 Units Subcutaneous daily 18 mL 3     ranitidine (ZANTAC) 150 MG tablet Take 1 tablet (150 mg) by mouth 2 times daily 60 tablet 2     lidocaine (XYLOCAINE) 5 % ointment Apply 1 g topically 2 times daily as needed for moderate pain 50 g 5     Ketoprofen POWD Apply 1 g topically 2 times daily as needed 100 g 5     traMADol (ULTRAM) 50 MG tablet Take 1 tablet (50 mg) by mouth every 8 hours as needed for moderate pain 60 tablet 3     fluconazole (DIFLUCAN) 100 MG tablet Take 100 mg by mouth daily as needed       hydroxychloroquine (PLAQUENIL) 200 MG tablet Take 2 tablets (400 mg) by  mouth daily EYE EXAM DUE/LETTER SENT 180 tablet 0     ferrous gluconate (FERGON) 324 (38 FE) MG tablet Take 1 tablet (324 mg) by mouth 2 times daily 180 tablet 1     ASPIRIN LOW DOSE 81 MG EC tablet TAKE 1 TABLET BY MOUTH EVERY DAY 90 tablet 3     blood glucose monitoring (ONE TOUCH DELICA) lancets Use to test blood sugars 4 times daily or as directed. 4 Box 3     vitamin D (ERGOCALCIFEROL) 46535 UNIT capsule Take 1 capsule (50,000 Units) by mouth every 7 days Need a Vitamin D level in 2 months. (Patient taking differently: Take 50,000 Units by mouth every 7 days Need a Vitamin D level in 2 months.) 8 capsule 0     metoprolol (TOPROL-XL) 100 MG 24 hr tablet Take 1 tablet (100 mg) by mouth daily 90 tablet 3     clopidogrel (PLAVIX) 75 MG tablet Take 1 tablet (75 mg) by mouth daily 90 tablet 3     lisinopril-hydrochlorothiazide (PRINZIDE/ZESTORETIC) 20-25 MG per tablet TAKE 1 TABLET BY MOUTH DAILY 90 tablet 3     blood glucose monitoring (NO BRAND SPECIFIED) meter device kit Use to test blood sugar 4 X times daily or as directed. 1 kit 0     blood glucose monitoring (NO BRAND SPECIFIED) test strip 1 strip by In Vitro route 4 times daily Test as directed. Please dispense three months and three months of refills. Thank you. 360 each 3     diphenhydrAMINE (BENADRYL) 25 MG capsule TK 1 TO 2 CS PO QHS  4     EPIPEN 2-RIKY 0.3 MG/0.3ML injection INJECT 0.3 MG INTO THE MUSCLE PRF ANAPHYALAXIS  0     AEROSPAN 80 MCG/ACT AERS INHALE 2 PUFFS PO BID.  RINSE MOUTH AFTERWARDS  4     fluticasone (FLONASE) 50 MCG/ACT spray U 2 SPRAYS IEN D.  3     Magnesium Oxide -Mg Supplement 250 MG TABS TK 1 T PO BID. REDUCE IF STOOLS LOOSEN  11     nystatin (MYCOSTATIN) 315470 UNITS TABS tablet TK 2 TS PO BID  0     albuterol (2.5 MG/3ML) 0.083% neb solution INL 1 VIAL VIA NEBULIZATION Q 4 TO 6 HOURS PRN  1     dicyclomine (BENTYL) 20 MG tablet Take 1 tablet (20 mg) by mouth 4 times daily as needed 60 tablet 5     sucralfate (CARAFATE) 1 GM  tablet Take 1 tablet (1 g) by mouth 4 times daily as needed 120 tablet 3     azelastine (ASTELIN) 0.1 % spray Spray 2 sprays into both nostrils 2 times daily 1 Bottle 3     fluocinolone (SYNALAR) 0.01 % solution Apply topically daily as needed       mometasone (NASONEX) 50 MCG/ACT spray Spray 2 sprays into both nostrils daily       cyclobenzaprine (FLEXERIL) 10 MG tablet Take 1 tablet (10 mg) by mouth 2 times daily as needed for muscle spasms 180 tablet 0     Ondansetron HCl (ZOFRAN PO)        pravastatin (PRAVACHOL) 40 MG tablet Take 1 tablet (40 mg) by mouth daily 30 tablet 11     spironolactone (ALDACTONE) 25 MG tablet Take 2 tablets (50 mg) by mouth daily 60 tablet 11     metFORMIN (GLUCOPHAGE-XR) 500 MG 24 hr tablet Take 2 tablets (1,000 mg) by mouth daily (with dinner) 60 tablet 11     montelukast (SINGULAIR) 10 MG tablet Take 1 tablet (10 mg) by mouth At Bedtime 30 tablet 1     ESOMEPRAZOLE MAGNESIUM PO Take 20 mg by mouth 2 times daily (before meals)       acetaminophen (TYLENOL) 325 MG tablet Take 1-2 tablets (325-650 mg) by mouth every 6 hours as needed for pain (headache) 250 tablet 0     metroNIDAZOLE (NORITATE) 1 % cream Apply topically daily       desonide (DESOWEN) 0.05 % cream Apply topically 2 times daily       ketoconazole (NIZORAL) 2 % shampoo Apply topically daily as needed       fluocinonide (LIDEX) 0.05 % external solution Apply topically 2 times daily To areas of itch on the scalp as needed. 60 mL 11     nitroglycerin (NITROSTAT) 0.4 MG SL tablet Place 1 tablet (0.4 mg) under the tongue every 5 minutes as needed for chest pain 25 tablet 1     albuterol (PROAIR HFA, PROVENTIL HFA, VENTOLIN HFA) 108 (90 BASE) MCG/ACT inhaler Inhale 2 puffs into the lungs every 6 hours as needed for shortness of breath / dyspnea or wheezing 3 Inhaler 1     calcium carbonate (TUMS) 500 MG chewable tablet Take 3-4 chew tab by mouth daily as needed.       fexofenadine (ALLEGRA) 180 MG tablet Take 1 tablet by mouth  daily as needed. 90 tablet 3     olopatadine (PATANOL) 0.1 % ophthalmic solution Place 1 drop into both eyes 2 times daily as needed for allergies. 5 mL 12       PAST SURGICAL HISTORY:  Past Surgical History:   Procedure Laterality Date     C ECHO HEART XTHORACIC,COMPLETE, W/O DOPPLER  04    [facility] WNL, EF 60%     C/SECTION, LOW TRANSVERSE           CARDIAC SURGERY      cardiac stent- recent in 16; 4 other stents     DILATION AND CURETTAGE N/A 2016    Procedure: DILATION AND CURETTAGE;  Surgeon: Nahed Butler MD;  Location: [facility] OR     HC UGI ENDOSCOPY W EUS  08    Dr. Pastrana, possible chronic pancreatitis, fatty liver     HERNIA REPAIR  2012    done at [facility]     INSERT INTRAUTERINE DEVICE N/A 2016    Procedure: INSERT INTRAUTERINE DEVICE;  Surgeon: Nahed Butler MD;  Location: [facility] OR     INT UTERINE FIBRIOD EMBOLIZATION  10/29/2014     LAPAROSCOPIC CHOLECYSTECTOMY  08    Dr. Arnol GRUBBS TX, CERVICAL      s/p LEEP     ORBITOTOMY Right 3/15/2016    Procedure: ORBITOTOMY;  Surgeon: Myron Cyr MD;  Location: [facility]     UPPER GI ENDOSCOPY  10/21/08    mild gastritis, Dr. Hidalgo       ALLERGIES     Allergies   Allergen Reactions     Amoxicillin-Pot Clavulanate      Augmentin      Unknown possible hives and edema     Azithromycin      Diatrizoate Other (See Comments)     Pt wants this listed because she is allergic to shellfish      Imitrex [Sumatriptan]      Severe face/neck/chest tightness and flushing side effects      Penicillins Hives     Unknown      Pork Allergy      Stomach pain, cramping, diarrhea, itching, nausea and headaches     Shellfish Allergy Hives and Swelling     Sulfa Drugs Hives and Swelling     Zithromax [Azithromycin Dihydrate] Swelling     Unknown        FAMILY HISTORY:  Family History   Problem Relation Age of Onset     HEART DISEASE Brother 15     MI at 15, 16.      HEART DISEASE Father 50     heart attack      CEREBROVASCULAR DISEASE Father      DIABETES Father      Hypertension Father      Depression Father      C.A.D. Father      DIABETES Maternal Uncle      Hypertension Mother      Arthritis Mother      HEART DISEASE Mother      long qt syndrome     Thyroid Disease Mother      C.A.D. Mother      Hypertension Maternal Aunt      Hypertension Maternal Uncle      Arthritis Brother      he passed away, had severe arthritis at age 11     Arthritis Maternal Uncle      Eye Disorder Maternal Uncle      cataracts     Neurologic Disorder Sister      migraines     Neurologic Disorder Sister      migraines     Respiratory Son      asthma     CEREBROVASCULAR DISEASE Maternal Uncle      C.A.D. Brother      Family History Negative       neg for RA, SLE     Unknown/Adopted No family hx of      unknown neurological issues in her family, mother was adopted     Skin Cancer No family hx of      No known family hx of skin cancer       SOCIAL HISTORY:  Social History     Social History     Marital status: Single     Spouse name: N/A     Number of children: 1     Years of education: N/A     Occupational History      None      Social History Main Topics     Smoking status: Former Smoker     Packs/day: 0.20     Years: 1.00     Types: Cigarettes     Quit date: 2/1/2016     Smokeless tobacco: Never Used     Alcohol use No     Drug use: No     Sexual activity: Not Currently     Other Topics Concern     Parent/Sibling W/ Cabg, Mi Or Angioplasty Before 65f 55m? Yes     Social History Narrative    Balanced Diet - sometimes    Osteoporosis Prevention Measures - Dairy servings per day: 2 servings weekly    Regular Exercise -  Yes Describe walking 4 times a week    Dental Exam - NO    Seatbelts used - Yes    Self Breast Exam - Yes    Abuse: Current or Past (Physical, Sexual or Emotional)- No    Do you have any concerns about STD's -  No    Do you feel safe in your environment - No    Guns stored in the home - No    Sunscreen used - Yes    Lipids -  YES  "- Date: 053102    Glucose -  YES - Date: 012804    Eye Exam - YES - Date: one year ago    Colon Cancer Screening - No    Hemoccults - NO    Pap Test -  YES - Date: 070904, remote history of LEEP    Mammography - YES - Date: last spring, would like to discuss, needs [facility]    DEXA - NO    Immunizations reviewed and up to date - Yes, last td given in 1997 or 1998       ROS:   Constitutional: No fever, chills, or sweats. No weight gain/loss   ENT: No visual disturbance, ear ache, epistaxis, sore throat  Allergies/Immunologic: Negative.   Respiratory: No cough, hemoptysia  Cardiovascular: As per HPI  GI: No nausea, vomiting, hematemesis, melena, or hematochezia  : No urinary frequency, dysuria, or hematuria  Integument: Negative  Psychiatric: Negative  Neuro: Negative  Endocrinology: Negative   Musculoskeletal: Negative    EXAM:  Vital Signs 6/28/2017   Systolic 125   Diastolic 81   Pulse 62   Weight (LB) 174 lb 11.2 oz   Height 5' 3\"   BMI (Calculated) 31.01   O2 98     In general, the patient is a pleasant female in no apparent distress.    HEENT: NC/AT.  PERRLA.  EOMI.  Sclerae white, not injected.  Nares clear.  Pharynx without erythema or exudate.  Dentition intact.    Neck: No adenopathy.  No thyromegaly. Carotids +4/4 bilaterally without bruits.  No jugular venous distension.   Heart: RRR. Normal S1, S2 splits physiologically. No murmur, rub, click, or gallop. The PMI is in the 5th ICS in the midclavicular line. There is no heave.    Lungs: CTA.  No ronchi, wheezes, rales.  No dullness to percussion.   Abdomen: Soft, nontender, nondistended. No organomegaly.  No bruits.   Extremities: No clubbing, cyanosis, or edema.  The pulses are +4/4 at the radial, brachial, femoral, popliteal, DP, and PT sites bilaterally.  No bruits are noted.  Neurologic: Alert and oriented to person/place/time, normal speech, gait and affect  Skin: No petechiae, purpura or rash.    Labs:  LIPID " RESULTS:  Lab Results   Component Value Date    CHOL 157 06/28/2017    HDL 37 (L) 06/28/2017    LDL 77 06/28/2017    TRIG 214 (H) 06/28/2017    CHOLHDLRATIO 3.5 07/29/2015    NHDL 120 06/28/2017       LIVER ENZYME RESULTS:  Lab Results   Component Value Date    AST 23 06/28/2017    ALT 28 06/28/2017       CBC RESULTS:  Lab Results   Component Value Date    WBC 12.0 (H) 06/01/2017    RBC 5.18 06/01/2017    HGB 13.8 06/01/2017    HCT 41.0 06/01/2017    MCV 79 06/01/2017    MCH 26.6 06/01/2017    MCHC 33.7 06/01/2017    RDW 14.8 06/01/2017     06/01/2017       BMP RESULTS:  Lab Results   Component Value Date     06/28/2017    POTASSIUM 3.4 06/28/2017    CHLORIDE 106 06/28/2017    CO2 28 06/28/2017    ANIONGAP 7 06/28/2017     (H) 06/28/2017    BUN 14 06/28/2017    CR 1.01 06/28/2017    GFRESTIMATED 58 (L) 06/28/2017    GFRESTBLACK 70 06/28/2017    KATHY 8.8 06/28/2017        A1C RESULTS:  Lab Results   Component Value Date    A1C 8.8 (H) 06/28/2017       INR RESULTS:  Lab Results   Component Value Date    INR 0.97 08/25/2016    INR 0.98 05/20/2015       Assessment and Plan:     We discussed the results with patient:  We re-emphasized the importance of a heart healthy diet.  We continue with same medical regimen.  We  gave following written instructions for the intermittent fluid retention:    Take additional Spironolactone 12.5 mg by mouth once daily for 3 days if your weight increases by more than 5 pounds as discussed with Dr. Peace.      Purchase compression stockings, 10-15 mmHg, and apply one to each leg daily. Remove at night.    Return to clinic in 6 months.  Fasting labs will be needed prior to this appointment.     Milan Peace MD, PhD  Professor of Medicine  Division of Cardiology    CC  Patient Care Team:  Kash Solano MD as PCP - General (Family Practice)  Milan Peace MD as MD (Cardiology)  Tito, Lauren M., RN as Nurse Coordinator (Cardiology)  Majo Mckinnon MD as  MD (Rheumatology)  Jolene Duenas MD as MD (Dermatology)  Manny Chowdhury MD as MD (Physical Medicine & Rehabilitation - Pain Medicine)  Jas Vernon MD as MD (Ophthalmology)  Jas Vernon MD as Referring Physician (Ophthalmology)  Charis Holbrook MD as Resident (Ophthalmology)  DARLING THOMPSON

## 2017-06-28 NOTE — NURSING NOTE
Chief Complaint   Patient presents with     Follow Up For     6 month follow up for CAD, HTN, hyperlipidemia      Vitals were taken and medications were reconciled.     SHERYL Tellez  5:19 PM

## 2017-06-28 NOTE — PROGRESS NOTES
HPI:     Ms Janine Cornell, who is a 52 yr -American patient with DM2, Hypothyroidism, PCOS, Dyslipidemia, HTN, and S/PNSTEMI (11/2011) s/p stent placement comes for follow-up.    Patient has a positive RA factor and fibromyalgia.  Patient feels relatively well.  She complaints much less about chest pain and SOB. She denies palpitations.  She feels still fatigued but has not done too much.  She worries about the different non-CVD issues and her discussions with the physicians who treat her non-CVD problems.  She has a lot of stress in her family - she and her son live in the house of her mother together with her mother and there is a lot of tension between her mother and her.      PAST MEDICAL HISTORY:  Past Medical History:   Diagnosis Date     Abnormal glandular Papanicolaou smear of cervix 1992     Allergic rhinitis     Allergy, airborne subst     Arthritis      ASCVD (arteriosclerotic cardiovascular disease)      Chronic pain      Chronic pancreatitis (H)     idiopathic, Tx: PPI, antioxidants, pancreatic enzymes     Common migraine      Coronary artery disease      Costochondritis      Costochondritis      Difficulty in walking(719.7)      Ectasia, mammary duct     [facility], persistent nipple discharge     Elevated fasting glucose      Gastro-oesophageal reflux disease      Hernia      Hyperlipidemia LDL goal < 100      Hypertension goal BP (blood pressure) < 140/90     Essential hypertension     Iron deficiency anemia      Ischemic cardiomyopathy      Menorrhagia      Migraine headaches      Mild persistent asthma      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 Matias, neg w/u, Rx'd w/prednisone     Ventral hernia, unspecified, without mention of obstruction or gangrene 7/2012       CURRENT MEDICATIONS:  Current Outpatient Prescriptions   Medication Sig Dispense Refill     fluconazole (DIFLUCAN) 200 MG tablet Take 1 tablet (200 mg) by mouth daily 10 tablet 0     hydrALAZINE (APRESOLINE) 25 MG tablet Take 0.5 tablets (12.5 mg) by mouth 3 times daily as needed , for systolic blood pressure >140 mmHg. (Patient taking differently: Take 12.5 mg by mouth as needed , for systolic blood pressure >140 mmHg.) 90 tablet 3     insulin pen needle (BD MARCK U/F) 32G X 4 MM USE DAILY OR AS DIRECTED 300 each 3     insulin glargine (BASAGLAR KWIKPEN) 100 UNIT/ML injection Inject 40 Units Subcutaneous daily 18 mL 3     ranitidine (ZANTAC) 150 MG tablet Take 1 tablet (150 mg) by mouth 2 times daily 60 tablet 2     lidocaine (XYLOCAINE) 5 % ointment Apply 1 g topically 2 times daily as needed for moderate pain 50 g 5     Ketoprofen POWD Apply 1 g topically 2 times daily as needed 100 g 5     traMADol (ULTRAM) 50 MG tablet Take 1 tablet (50 mg) by mouth every 8 hours as needed for moderate pain 60 tablet 3     fluconazole (DIFLUCAN) 100 MG tablet Take 100 mg by mouth daily as needed       hydroxychloroquine (PLAQUENIL) 200 MG tablet Take 2 tablets (400 mg) by mouth daily EYE EXAM DUE/LETTER SENT 180 tablet 0     ferrous gluconate (FERGON) 324 (38 FE) MG tablet Take 1 tablet (324 mg) by mouth 2 times daily 180 tablet 1     ASPIRIN LOW DOSE 81 MG EC tablet TAKE 1 TABLET BY MOUTH EVERY DAY 90 tablet 3     blood glucose monitoring (ONE TOUCH DELICA) lancets Use to test blood sugars  4 times daily or as directed. 4 Box 3     vitamin D (ERGOCALCIFEROL) 92086 UNIT capsule Take 1 capsule (50,000 Units) by mouth every 7 days Need a Vitamin D level in 2 months. (Patient taking differently: Take 50,000 Units by mouth every 7 days Need a Vitamin D level in 2 months.) 8 capsule 0     metoprolol (TOPROL-XL) 100 MG 24 hr tablet Take 1 tablet (100 mg) by mouth daily 90 tablet 3     clopidogrel (PLAVIX) 75 MG tablet Take 1 tablet (75 mg) by mouth daily 90 tablet 3     lisinopril-hydrochlorothiazide (PRINZIDE/ZESTORETIC) 20-25 MG per tablet TAKE 1 TABLET BY MOUTH DAILY 90 tablet 3     blood glucose monitoring (NO BRAND SPECIFIED) meter device kit Use to test blood sugar 4 X times daily or as directed. 1 kit 0     blood glucose monitoring (NO BRAND SPECIFIED) test strip 1 strip by In Vitro route 4 times daily Test as directed. Please dispense three months and three months of refills. Thank you. 360 each 3     diphenhydrAMINE (BENADRYL) 25 MG capsule TK 1 TO 2 CS PO QHS  4     EPIPEN 2-RIKY 0.3 MG/0.3ML injection INJECT 0.3 MG INTO THE MUSCLE PRF ANAPHYALAXIS  0     AEROSPAN 80 MCG/ACT AERS INHALE 2 PUFFS PO BID.  RINSE MOUTH AFTERWARDS  4     fluticasone (FLONASE) 50 MCG/ACT spray U 2 SPRAYS IEN D.  3     Magnesium Oxide -Mg Supplement 250 MG TABS TK 1 T PO BID. REDUCE IF STOOLS LOOSEN  11     nystatin (MYCOSTATIN) 228407 UNITS TABS tablet TK 2 TS PO BID  0     albuterol (2.5 MG/3ML) 0.083% neb solution INL 1 VIAL VIA NEBULIZATION Q 4 TO 6 HOURS PRN  1     dicyclomine (BENTYL) 20 MG tablet Take 1 tablet (20 mg) by mouth 4 times daily as needed 60 tablet 5     sucralfate (CARAFATE) 1 GM tablet Take 1 tablet (1 g) by mouth 4 times daily as needed 120 tablet 3     azelastine (ASTELIN) 0.1 % spray Spray 2 sprays into both nostrils 2 times daily 1 Bottle 3     fluocinolone (SYNALAR) 0.01 % solution Apply topically daily as needed       mometasone (NASONEX) 50 MCG/ACT spray Spray 2 sprays into both nostrils daily        cyclobenzaprine (FLEXERIL) 10 MG tablet Take 1 tablet (10 mg) by mouth 2 times daily as needed for muscle spasms 180 tablet 0     Ondansetron HCl (ZOFRAN PO)        pravastatin (PRAVACHOL) 40 MG tablet Take 1 tablet (40 mg) by mouth daily 30 tablet 11     spironolactone (ALDACTONE) 25 MG tablet Take 2 tablets (50 mg) by mouth daily 60 tablet 11     metFORMIN (GLUCOPHAGE-XR) 500 MG 24 hr tablet Take 2 tablets (1,000 mg) by mouth daily (with dinner) 60 tablet 11     montelukast (SINGULAIR) 10 MG tablet Take 1 tablet (10 mg) by mouth At Bedtime 30 tablet 1     ESOMEPRAZOLE MAGNESIUM PO Take 20 mg by mouth 2 times daily (before meals)       acetaminophen (TYLENOL) 325 MG tablet Take 1-2 tablets (325-650 mg) by mouth every 6 hours as needed for pain (headache) 250 tablet 0     metroNIDAZOLE (NORITATE) 1 % cream Apply topically daily       desonide (DESOWEN) 0.05 % cream Apply topically 2 times daily       ketoconazole (NIZORAL) 2 % shampoo Apply topically daily as needed       fluocinonide (LIDEX) 0.05 % external solution Apply topically 2 times daily To areas of itch on the scalp as needed. 60 mL 11     nitroglycerin (NITROSTAT) 0.4 MG SL tablet Place 1 tablet (0.4 mg) under the tongue every 5 minutes as needed for chest pain 25 tablet 1     albuterol (PROAIR HFA, PROVENTIL HFA, VENTOLIN HFA) 108 (90 BASE) MCG/ACT inhaler Inhale 2 puffs into the lungs every 6 hours as needed for shortness of breath / dyspnea or wheezing 3 Inhaler 1     calcium carbonate (TUMS) 500 MG chewable tablet Take 3-4 chew tab by mouth daily as needed.       fexofenadine (ALLEGRA) 180 MG tablet Take 1 tablet by mouth daily as needed. 90 tablet 3     olopatadine (PATANOL) 0.1 % ophthalmic solution Place 1 drop into both eyes 2 times daily as needed for allergies. 5 mL 12       PAST SURGICAL HISTORY:  Past Surgical History:   Procedure Laterality Date     C ECHO HEART XTHORACIC,COMPLETE, W/O DOPPLER  2/4/04    [facility] WNL, EF 60%      C/SECTION, LOW TRANSVERSE           CARDIAC SURGERY      cardiac stent- recent in 16; 4 other stents     DILATION AND CURETTAGE N/A 2016    Procedure: DILATION AND CURETTAGE;  Surgeon: Nahed Butler MD;  Location: [facility]     HC UGI ENDOSCOPY W EUS  08    Dr. Pastrana, possible chronic pancreatitis, fatty liver     [place] [facility] [place] INTRAUTERINE DEVICE N/A 2016    Procedure: INSERT INTRAUTERINE DEVICE;  Surgeon: Nahed Butler MD;  Location: [facility] OR     INT UTERINE FIBRIOD EMBOLIZATION  10/29/2014     LAPAROSCOPIC CHOLECYSTECTOMY  08    Dr. Arnol GRUBBS TX, CERVICAL      s/p LEEP     ORBITOTOMY Right 3/15/2016    Procedure: ORBITOTOMY;  Surgeon: Myron Cyr MD;  Location: [facility]     UPPER GI ENDOSCOPY  10/21/08    mild gastritis, Dr. Hidalgo       ALLERGIES     Allergies   Allergen Reactions     Amoxicillin-Pot Clavulanate      Augmentin      Unknown possible hives and edema     Azithromycin      Diatrizoate Other (See Comments)     Pt wants this listed because she is allergic to shellfish      Imitrex [Sumatriptan]      Severe face/neck/chest tightness and flushing side effects      Penicillins Hives     Unknown      Pork Allergy      Stomach pain, cramping, diarrhea, itching, nausea and headaches     Shellfish Allergy Hives and Swelling     Sulfa Drugs Hives and Swelling     Zithromax [Azithromycin Dihydrate] Swelling     Unknown        FAMILY HISTORY:  Family History   Problem Relation Age of Onset     HEART DISEASE Brother 15     MI at 15, 16.      HEART DISEASE Father 50     heart attack     CEREBROVASCULAR DISEASE Father      DIABETES Father      Hypertension Father      Depression Father      C.A.D. Father      DIABETES Maternal Uncle      Hypertension Mother      Arthritis Mother      HEART DISEASE Mother      long qt syndrome     Thyroid Disease Mother      C.A.D. Mother      Hypertension Maternal Aunt      Hypertension  Maternal Uncle      Arthritis Brother      he passed away, had severe arthritis at age 11     Arthritis Maternal Uncle      Eye Disorder Maternal Uncle      cataracts     Neurologic Disorder Sister      migraines     Neurologic Disorder Sister      migraines     Respiratory Son      asthma     CEREBROVASCULAR DISEASE Maternal Uncle      C.A.D. Brother      Family History Negative       neg for RA, SLE     Unknown/Adopted No family hx of      unknown neurological issues in her family, mother was adopted     Skin Cancer No family hx of      No known family hx of skin cancer       SOCIAL HISTORY:  Social History     Social History     Marital status: Single     Spouse name: N/A     Number of children: 1     Years of education: N/A     Occupational History      None      Social History Main Topics     Smoking status: Former Smoker     Packs/day: 0.20     Years: 1.00     Types: Cigarettes     Quit date: 2/1/2016     Smokeless tobacco: Never Used     Alcohol use No     Drug use: No     Sexual activity: Not Currently     Other Topics Concern     Parent/Sibling W/ Cabg, Mi Or Angioplasty Before 65f 55m? Yes     Social History Narrative    Balanced Diet - sometimes    Osteoporosis Prevention Measures - Dairy servings per day: 2 servings weekly    Regular Exercise -  Yes Describe walking 4 times a week    Dental Exam - NO    Seatbelts used - Yes    Self Breast Exam - Yes    Abuse: Current or Past (Physical, Sexual or Emotional)- No    Do you have any concerns about STD's -  No    Do you feel safe in your environment - No    Guns stored in the home - No    Sunscreen used - Yes    Lipids -  YES - Date: 053102    Glucose -  YES - Date: 012804    Eye Exam - YES - Date: one year ago    Colon Cancer Screening - No    Hemoccults - NO    Pap Test -  YES - Date: 070904, remote history of LEEP    Mammography - YES - Date: last spring, would like to discuss, needs a referral to [facility]    DEXA - NO    Immunizations  "reviewed and up to date - Yes, last td given in 1997 or 1998       ROS:   Constitutional: No fever, chills, or sweats. No weight gain/loss   ENT: No visual disturbance, ear ache, epistaxis, sore throat  Allergies/Immunologic: Negative.   Respiratory: No cough, hemoptysia  Cardiovascular: As per HPI  GI: No nausea, vomiting, hematemesis, melena, or hematochezia  : No urinary frequency, dysuria, or hematuria  Integument: Negative  Psychiatric: Negative  Neuro: Negative  Endocrinology: Negative   Musculoskeletal: Negative    EXAM:  Vital Signs 6/28/2017   Systolic 125   Diastolic 81   Pulse 62   Weight (LB) 174 lb 11.2 oz   Height 5' 3\"   BMI (Calculated) 31.01   O2 98     In general, the patient is a pleasant female in no apparent distress.    HEENT: NC/AT.  PERRLA.  EOMI.  Sclerae white, not injected.  Nares clear.  Pharynx without erythema or exudate.  Dentition intact.    Neck: No adenopathy.  No thyromegaly. Carotids +4/4 bilaterally without bruits.  No jugular venous distension.   Heart: RRR. Normal S1, S2 splits physiologically. No murmur, rub, click, or gallop. The PMI is in the 5th ICS in the midclavicular line. There is no heave.    Lungs: CTA.  No ronchi, wheezes, rales.  No dullness to percussion.   Abdomen: Soft, nontender, nondistended. No organomegaly.  No bruits.   Extremities: No clubbing, cyanosis, or edema.  The pulses are +4/4 at the radial, brachial, femoral, popliteal, DP, and PT sites bilaterally.  No bruits are noted.  Neurologic: Alert and oriented to person/place/time, normal speech, gait and affect  Skin: No petechiae, purpura or rash.    Labs:  LIPID RESULTS:  Lab Results   Component Value Date    CHOL 157 06/28/2017    HDL 37 (L) 06/28/2017    LDL 77 06/28/2017    TRIG 214 (H) 06/28/2017    CHOLHDLRATIO 3.5 07/29/2015    NHDL 120 06/28/2017       LIVER ENZYME RESULTS:  Lab Results   Component Value Date    AST 23 06/28/2017    ALT 28 06/28/2017       CBC RESULTS:  Lab Results   Component " Value Date    WBC 12.0 (H) 06/01/2017    RBC 5.18 06/01/2017    HGB 13.8 06/01/2017    HCT 41.0 06/01/2017    MCV 79 06/01/2017    MCH 26.6 06/01/2017    MCHC 33.7 06/01/2017    RDW 14.8 06/01/2017     06/01/2017       BMP RESULTS:  Lab Results   Component Value Date     06/28/2017    POTASSIUM 3.4 06/28/2017    CHLORIDE 106 06/28/2017    CO2 28 06/28/2017    ANIONGAP 7 06/28/2017     (H) 06/28/2017    BUN 14 06/28/2017    CR 1.01 06/28/2017    GFRESTIMATED 58 (L) 06/28/2017    GFRESTBLACK 70 06/28/2017    KATHY 8.8 06/28/2017        A1C RESULTS:  Lab Results   Component Value Date    A1C 8.8 (H) 06/28/2017       INR RESULTS:  Lab Results   Component Value Date    INR 0.97 08/25/2016    INR 0.98 05/20/2015             Assessment and Plan:     We discussed the results with patient:  We re-emphasized the importance of a heart healthy diet.  We continue with same medical regimen.  We  gave following written instructions for the intermittent fluid retention:    Take additional Spironolactone 12.5 mg by mouth once daily for 3 days if your weight increases by more than 5 pounds as discussed with Dr. Peace.      Purchase compression stockings, 10-15 mmHg, and apply one to each leg daily. Remove at night.    Return to clinic in 6 months.  Fasting labs will be needed prior to this appointment.     Milan Peace MD, PhD  Professor of Medicine  Division of Cardiology  [facility]  Patient Care Team:  Kash Solano MD as PCP - General (Family Practice)  Milan Peace MD as MD (Cardiology)  Lauren Francis, RN as Nurse Coordinator (Cardiology)  Majo Mckinnon MD as MD (Rheumatology)  Jolene Duenas MD as MD (Dermatology)  Manny Chowdhury MD as MD (Physical Medicine & Rehabilitation - Pain Medicine)  Jas Vernon MD as MD (Ophthalmology)  Jas Vernon MD as Referring Physician (Ophthalmology)  Charis Holbrook MD as Resident (Ophthalmology)  JAY,  DARLING

## 2017-06-28 NOTE — MR AVS SNAPSHOT
After Visit Summary   6/28/2017    Janine Cornell    MRN: [id number]           Patient Information     Date Of Birth          1965        Visit Information        Provider Department      6/28/2017 4:30 PM Milan Peace MD [facility]        Today's Diagnoses     Bilateral lower extremity edema    -  1    Venous incompetence          Care Instructions    Take additional Spironolactone 12.5 mg by mouth once daily for 3 days if your weight increases by more than 5 pounds as discussed with Dr. Peace.     Purchase compression stockings, 10-15 mmHg, and apply one to each leg daily. Remove at night.    Return to clinic in 6 months.  Fasting labs will be needed prior to this appointment.     Please do not hesitate to call if you have any cardiology related questions or concerns, or need to schedule an appointment, at [phone].         Cardiology Medication Refill Request Information:  * Please contact your pharmacy regarding ANY request for medication refills.  ** [facility] Prescription Fax = [phone]  * Please allow 3 business days for routine medication refills.    Cardiology Test Result notification information:  *You will be notified with in 7-10 days of your appointment day regarding the results of your test. If you are on MyChart you will be notified as soon as the provider has reviewed the results and signed off on them. Please call RN Care Coordinator with questions regarding results.              Follow-ups after your visit        Follow-up notes from your care team     Discussed this visit Return in about 6 months (around 12/28/2017) for Kobi, CAD, HTN, Cholesterol, FASTING lab work, Routine Visit.      Your next 10 appointments already scheduled     Dec 20, 2017  3:30 PM CST   Lab with  LAB   [facility] ([facility])    [address] [place] [id number] [phone]            Dec 20, 2017  4:00 PM CST   (Arrive by 3:45 PM)    Panel Management Review      Patient has the following on her problem list: None      Composite cancer screening  Chart review shows that this patient is due/due soon for the following Pap Smear  Summary:    Patient is due/failing the following:   PAP and PHYSICAL    Action needed:   Patient needs office visit for Physical with pap.    Type of outreach:    Phone, left message for patient to call back.  and Sent letter.    Questions for provider review:    None                                                                                                                                    An,CMA        "  Return Visit with Milan Peace MD   [facility] ([facility])    [address]  [address] [facility] [address] [phone]              Who to contact     If you have questions or need follow up information about today's clinic visit or your schedule please contact [facility] directly at [phone].  Normal or non-critical lab and imaging results will be communicated to you by MyChart, letter or phone within 4 business days after the clinic has received the results. If you do not hear from us within 7 days, please contact the clinic through Groopiehart or phone. If you have a critical or abnormal lab result, we will notify you by phone as soon as possible.  Submit refill requests through Domob or call your pharmacy and they will forward the refill request to us. Please allow 3 business days for your refill to be completed.          Additional Information About Your Visit        Gro[employer] Information     Domob gives you secure access to your electronic health record. If you see a primary care provider, you can also send messages to your care team and make appointments. If you have questions, please call your primary care clinic.  If you do not have a primary care provider, please call [phone] and they will assist you.        Care EveryWhere ID     This is your Care EveryWhere ID. This could be used by other organizations to access your [facility] medical records  [id number]        Your Vitals Were     Pulse Height Pulse Oximetry BMI (Body Mass Index)          62 1.6 m (5' 3\") 98% 30.95 kg/m2         Blood Pressure from Last 3 Encounters:   06/28/17 125/81   06/15/17 120/82   05/05/17 109/72    Weight from Last 3 Encounters:   06/28/17 79.2 kg (174 lb 11.2 oz)   06/15/17 76.2 kg (168 lb)   05/05/17 76.4 kg (168 lb 6.4 oz)              Today, you had the following     No orders found for display         Today's Medication Changes          These changes " are accurate as of: 6/28/17  6:52 PM.  If you have any questions, ask your nurse or doctor.               Start taking these medicines.        Dose/Directions    * COMPRESSION STOCKINGS   Used for:  Bilateral lower extremity edema, Venous incompetence   Started by:  Milan Peace MD        Dose:  2 each   2 each daily   Quantity:  4 each   Refills:  2       * COMPRESSION STOCKINGS   Used for:  Venous incompetence, Bilateral lower extremity edema   Started by:  Milan Peace MD        Dose:  2 each   2 each daily Apply one 10-15 mmHg compression stocking to each lower extgmierty during the day and remove before bedtime.   Quantity:  4 each   Refills:  2       * Notice:  This list has 2 medication(s) that are the same as other medications prescribed for you. Read the directions carefully, and ask your doctor or other care provider to review them with you.      These medicines have changed or have updated prescriptions.        Dose/Directions    hydrALAZINE 25 MG tablet   Commonly known as:  APRESOLINE   This may have changed:    - when to take this  - additional instructions   Used for:  Essential hypertension        Dose:  12.5 mg   Take 0.5 tablets (12.5 mg) by mouth 3 times daily as needed , for systolic blood pressure >140 mmHg.   Quantity:  90 tablet   Refills:  3       vitamin D 86919 UNIT capsule   Commonly known as:  ERGOCALCIFEROL   This may have changed:  additional instructions   Used for:  Vitamin D deficiency        Dose:  35011 Units   Take 1 capsule (50,000 Units) by mouth every 7 days Need a Vitamin D level in 2 months.   Quantity:  8 capsule   Refills:  0            Where to get your medicines      Some of these will need a paper prescription and others can be bought over the counter.  Ask your nurse if you have questions.     Bring a paper prescription for each of these medications     COMPRESSION STOCKINGS    COMPRESSION STOCKINGS                Primary Care Provider Office Phone # Fax #     Kash Solano [phone]       [facility] [address] [place] [id number]        Equal Access to Services     MANDA QUINN : Hadii aad ku hadkiranharman Ruthjunaid, wasolda luqoseiha, qacodyta kaalmada alex, gagandeep reyes jessicadarshana clark laJosé Miguelkei vincent. So [facility] [phone].    ATENCIÓN: Si habla español, tiene a burch disposición servicios gratuitos de asistencia lingüística. Llame al [phone].    We comply with applicable federal civil rights laws and [place] laws. We do not discriminate on the basis of race, color, national origin, age, disability sex, sexual orientation or gender identity.            Thank you!     Thank you for choosing [facility]  for your care. Our goal is always to provide you with excellent care. Hearing back from our patients is one way we can continue to improve our services. Please take a few minutes to complete the written survey that you may receive in the mail after your visit with us. Thank you!             Your Updated Medication List - Protect others around you: Learn how to safely use, store and throw away your medicines at www.disposemymeds.org.          This list is accurate as of: 6/28/17  6:52 PM.  Always use your most recent med list.                   Brand Name Dispense Instructions for use Diagnosis    acetaminophen 325 MG tablet    TYLENOL    250 tablet    Take 1-2 tablets (325-650 mg) by mouth every 6 hours as needed for pain (headache)    Other chronic pain, Headache(784.0)       AEROSPAN 80 MCG/ACT Aers oral inhaler   Generic drug:  flunisolide HFA      INHALE 2 PUFFS PO BID.  RINSE MOUTH AFTERWARDS        * albuterol 108 (90 BASE) MCG/ACT Inhaler    PROAIR HFA/PROVENTIL HFA/VENTOLIN HFA    3 Inhaler    Inhale 2 puffs into the lungs every 6 hours as needed for shortness of breath / dyspnea or wheezing        * albuterol (2.5 MG/3ML) 0.083% neb solution      INL 1 VIAL VIA NEBULIZATION Q 4 TO 6 HOURS PRN        ASPIRIN LOW  DOSE 81 MG EC tablet   Generic drug:  aspirin     90 tablet    TAKE 1 TABLET BY MOUTH EVERY DAY    Cardiomyopathy, unspecified (H)       azelastine 0.1 % spray    ASTELIN    1 Bottle    Spray 2 sprays into both nostrils 2 times daily    Chronic rhinitis, Dysfunction of eustachian tube, unspecified laterality       blood glucose monitoring lancets     4 Box    Use to test blood sugars 4 times daily or as directed.    Type 2 diabetes, HbA1c goal < 7% (H)       blood glucose monitoring meter device kit    no brand specified    1 kit    Use to test blood sugar 4 X times daily or as directed.    Type 2 diabetes, HbA1c goal < 7% (H)       blood glucose monitoring test strip    no brand specified    360 each    1 strip by In Vitro route 4 times daily Test as directed. Please dispense three months and three months of refills. Thank you.    Type 2 diabetes, HbA1c goal < 7% (H)       clopidogrel 75 MG tablet    PLAVIX    90 tablet    Take 1 tablet (75 mg) by mouth daily        * COMPRESSION STOCKINGS     4 each    2 each daily    Bilateral lower extremity edema, Venous incompetence       * COMPRESSION STOCKINGS     4 each    2 each daily Apply one 10-15 mmHg compression stocking to each lower extgmierty during the day and remove before bedtime.    Venous incompetence, Bilateral lower extremity edema       cyclobenzaprine 10 MG tablet    FLEXERIL    180 tablet    Take 1 tablet (10 mg) by mouth 2 times daily as needed for muscle spasms    Fibromyalgia       desonide 0.05 % cream    DESOWEN     Apply topically 2 times daily        dicyclomine 20 MG tablet    BENTYL    60 tablet    Take 1 tablet (20 mg) by mouth 4 times daily as needed    Other irritable bowel syndrome       diphenhydrAMINE 25 MG capsule    BENADRYL     TK 1 TO 2 CS PO QHS        EPIPEN 2-RIKY 0.3 MG/0.3ML injection   Generic drug:  EPINEPHrine      INJECT 0.3 MG INTO THE MUSCLE PRF ANAPHYALAXIS        ESOMEPRAZOLE MAGNESIUM PO      Take 20 mg by mouth 2 times  daily (before meals)        ferrous gluconate 324 (38 FE) MG tablet    FERGON    180 tablet    Take 1 tablet (324 mg) by mouth 2 times daily    AILEEN (iron deficiency anemia)       fexofenadine 180 MG tablet    ALLEGRA    90 tablet    Take 1 tablet by mouth daily as needed.    Chronic rhinitis       * fluconazole 100 MG tablet    DIFLUCAN     Take 100 mg by mouth daily as needed        * fluconazole 200 MG tablet    DIFLUCAN    10 tablet    Take 1 tablet (200 mg) by mouth daily    Thrush       fluocinolone 0.01 % solution    SYNALAR     Apply topically daily as needed        fluocinonide 0.05 % solution    LIDEX    60 mL    Apply topically 2 times daily To areas of itch on the scalp as needed.    Telogen effluvium       fluticasone 50 MCG/ACT spray    FLONASE     U 2 SPRAYS IEN D.        hydrALAZINE 25 MG tablet    APRESOLINE    90 tablet    Take 0.5 tablets (12.5 mg) by mouth 3 times daily as needed , for systolic blood pressure >140 mmHg.    Essential hypertension       hydroxychloroquine 200 MG tablet    PLAQUENIL    180 tablet    Take 2 tablets (400 mg) by mouth daily EYE EXAM DUE/LETTER SENT    Inflammatory arthritis       insulin glargine 100 UNIT/ML injection     18 mL    Inject 40 Units Subcutaneous daily    Type 2 diabetes mellitus without complication (H)       insulin pen needle 32G X 4 MM    BD MARCK U/F    300 each    USE DAILY OR AS DIRECTED    Type 2 diabetes, HbA1c goal < 7% (H)       ketoconazole 2 % shampoo    NIZORAL     Apply topically daily as needed        Ketoprofen Powd     100 g    Apply 1 g topically 2 times daily as needed    Fibromyalgia       lidocaine 5 % ointment    XYLOCAINE    50 g    Apply 1 g topically 2 times daily as needed for moderate pain    Fibromyalgia, Rheumatoid arthritis involving both wrists with positive rheumatoid factor (H)       lisinopril-hydrochlorothiazide 20-25 MG per tablet    PRINZIDE/ZESTORETIC    90 tablet    TAKE 1 TABLET BY MOUTH DAILY    HTN (hypertension)        Magnesium Oxide -Mg Supplement 250 MG Tabs      TK 1 T PO BID. REDUCE IF STOOLS LOOSEN        metFORMIN 500 MG 24 hr tablet    GLUCOPHAGE-XR    60 tablet    Take 2 tablets (1,000 mg) by mouth daily (with dinner)    Type 2 diabetes mellitus not at goal (H)       metoprolol 100 MG 24 hr tablet    TOPROL-XL    90 tablet    Take 1 tablet (100 mg) by mouth daily    Coronary artery disease involving native heart, angina presence unspecified, unspecified vessel or lesion type       metroNIDAZOLE 1 % cream    NORITATE     Apply topically daily        mometasone 50 MCG/ACT spray    NASONEX     Spray 2 sprays into both nostrils daily        montelukast 10 MG tablet    SINGULAIR    30 tablet    Take 1 tablet (10 mg) by mouth At Bedtime    Uncomplicated asthma, unspecified asthma severity       nitroGLYcerin 0.4 MG sublingual tablet    NITROSTAT    25 tablet    Place 1 tablet (0.4 mg) under the tongue every 5 minutes as needed for chest pain    NSTEMI (non-ST elevated myocardial infarction) (H)       nystatin 512224 UNITS Tabs tablet    MYCOSTATIN     TK 2 TS PO BID        olopatadine 0.1 % ophthalmic solution    PATANOL    5 mL    Place 1 drop into both eyes 2 times daily as needed for allergies.    Chronic rhinitis       pravastatin 40 MG tablet    PRAVACHOL    30 tablet    Take 1 tablet (40 mg) by mouth daily    CAD (coronary artery disease)       ranitidine 150 MG tablet    ZANTAC    60 tablet    Take 1 tablet (150 mg) by mouth 2 times daily    Gastritis       spironolactone 25 MG tablet    ALDACTONE    60 tablet    Take 2 tablets (50 mg) by mouth daily    Hypertension goal BP (blood pressure) < 140/90       sucralfate 1 GM tablet    CARAFATE    120 tablet    Take 1 tablet (1 g) by mouth 4 times daily as needed    Abdominal pain, epigastric       traMADol 50 MG tablet    ULTRAM    60 tablet    Take 1 tablet (50 mg) by mouth every 8 hours as needed for moderate pain    Undifferentiated connective tissue disease (H)        TUMS 500 MG chewable tablet   Generic drug:  calcium carbonate      Take 3-4 chew tab by mouth daily as needed.        vitamin D 99127 UNIT capsule    ERGOCALCIFEROL    8 capsule    Take 1 capsule (50,000 Units) by mouth every 7 days Need a Vitamin D level in 2 months.    Vitamin D deficiency       ZOFRAN PO           * Notice:  This list has 6 medication(s) that are the same as other medications prescribed for you. Read the directions carefully, and ask your doctor or other care provider to review them with you.

## 2017-06-29 NOTE — NURSING NOTE
Diet: Patient instructed regarding a heart healthy diet, including discussion of reduced fat and sodium intake. Patient demonstrated understanding of this information and agreed to call with further questions or concerns.  Labs: Patient was given results of the laboratory testing obtained today. Patient was instructed to return for the next laboratory testing in 6 months. Patient demonstrated understanding of this information and agreed to call with further questions or concerns.   Med Reconcile: Reviewed and verified all current medications with the patient. The updated medication list was printed and given to the patient.  Return Appointment: Patient given instructions regarding scheduling next clinic visit. Patient demonstrated understanding of this information and agreed to call with further questions or concerns.

## 2017-07-25 ENCOUNTER — OFFICE VISIT (OUTPATIENT)
Dept: FAMILY MEDICINE | Facility: CLINIC | Age: 52
End: 2017-07-25
Payer: COMMERCIAL

## 2017-07-25 VITALS
TEMPERATURE: 97 F | DIASTOLIC BLOOD PRESSURE: 70 MMHG | WEIGHT: 165 LBS | SYSTOLIC BLOOD PRESSURE: 108 MMHG | OXYGEN SATURATION: 98 % | HEIGHT: 63 IN | HEART RATE: 80 BPM | BODY MASS INDEX: 29.23 KG/M2

## 2017-07-25 DIAGNOSIS — Z98.61 CAD S/P PERCUTANEOUS CORONARY ANGIOPLASTY: ICD-10-CM

## 2017-07-25 DIAGNOSIS — I21.4 NSTEMI (NON-ST ELEVATED MYOCARDIAL INFARCTION) (H): ICD-10-CM

## 2017-07-25 DIAGNOSIS — E78.5 HYPERLIPIDEMIA LDL GOAL <70: ICD-10-CM

## 2017-07-25 DIAGNOSIS — H57.89 MASS OF EYE, RIGHT: ICD-10-CM

## 2017-07-25 DIAGNOSIS — J45.40 MODERATE PERSISTENT ASTHMA WITHOUT COMPLICATION: ICD-10-CM

## 2017-07-25 DIAGNOSIS — Z01.818 PREOP GENERAL PHYSICAL EXAM: Primary | ICD-10-CM

## 2017-07-25 DIAGNOSIS — K21.9 GASTROESOPHAGEAL REFLUX DISEASE, ESOPHAGITIS PRESENCE NOT SPECIFIED: ICD-10-CM

## 2017-07-25 DIAGNOSIS — E11.69 TYPE 2 DIABETES MELLITUS WITH OTHER SPECIFIED COMPLICATION (H): ICD-10-CM

## 2017-07-25 DIAGNOSIS — I25.10 CAD S/P PERCUTANEOUS CORONARY ANGIOPLASTY: ICD-10-CM

## 2017-07-25 DIAGNOSIS — E87.1 HYPONATREMIA: ICD-10-CM

## 2017-07-25 DIAGNOSIS — M06.00 SERONEGATIVE RHEUMATOID ARTHRITIS (H): ICD-10-CM

## 2017-07-25 DIAGNOSIS — K86.1 CHRONIC PANCREATITIS, UNSPECIFIED PANCREATITIS TYPE (H): ICD-10-CM

## 2017-07-25 DIAGNOSIS — I10 HYPERTENSION GOAL BP (BLOOD PRESSURE) < 140/90: ICD-10-CM

## 2017-07-25 DIAGNOSIS — F33.8 SEASONAL AFFECTIVE DISORDER (H): ICD-10-CM

## 2017-07-25 LAB
ERYTHROCYTE [DISTWIDTH] IN BLOOD BY AUTOMATED COUNT: 13.1 % (ref 10–15)
HCT VFR BLD AUTO: 40.6 % (ref 35–47)
HGB BLD-MCNC: 13.9 G/DL (ref 11.7–15.7)
MCH RBC QN AUTO: 26.7 PG (ref 26.5–33)
MCHC RBC AUTO-ENTMCNC: 34.2 G/DL (ref 31.5–36.5)
MCV RBC AUTO: 78 FL (ref 78–100)
PLATELET # BLD AUTO: 334 10E9/L (ref 150–450)
RBC # BLD AUTO: 5.21 10E12/L (ref 3.8–5.2)
WBC # BLD AUTO: 11 10E9/L (ref 4–11)

## 2017-07-25 PROCEDURE — 85027 COMPLETE CBC AUTOMATED: CPT | Performed by: FAMILY MEDICINE

## 2017-07-25 PROCEDURE — 36415 COLL VENOUS BLD VENIPUNCTURE: CPT | Performed by: FAMILY MEDICINE

## 2017-07-25 PROCEDURE — 80053 COMPREHEN METABOLIC PANEL: CPT | Performed by: FAMILY MEDICINE

## 2017-07-25 PROCEDURE — 99215 OFFICE O/P EST HI 40 MIN: CPT | Performed by: FAMILY MEDICINE

## 2017-07-25 PROCEDURE — 93000 ELECTROCARDIOGRAM COMPLETE: CPT | Performed by: FAMILY MEDICINE

## 2017-07-25 PROCEDURE — 83690 ASSAY OF LIPASE: CPT | Performed by: FAMILY MEDICINE

## 2017-07-25 NOTE — PATIENT INSTRUCTIONS
Hold Aspirin and plavix one week before surgery (per your Cardiologist).    She takes all her blood pressure medications at night.     Lantus 32 U in the morning. Hold Metformin.     I will look into whether you need to hold your plaquenil and mychart message you.     Hold all your other medications on the morning of the surgery.     Nothing by mouth after midnight.     No advil or aleve 3 days before surgery.     Please schedule echocardiogram before surgery. Please call [phone] to schedule imaging.       Before Your Surgery      Call your surgeon if there is any change in your health. This includes signs of a cold or flu (such as a sore throat, runny nose, cough, rash or fever).    Do not smoke, drink alcohol or take over the counter medicine (unless your surgeon or primary care doctor tells you to) for the 24 hours before and after surgery.    If you take prescribed drugs: Follow your doctor s orders about which medicines to take and which to stop until after surgery.    Eating and drinking prior to surgery: follow the instructions from your surgeon    Take a shower or bath the night before surgery. Use the soap your surgeon gave you to gently clean your skin. If you do not have soap from your surgeon, use your regular soap. Do not shave or scrub the surgery site.  Wear clean pajamas and have clean sheets on your bed.

## 2017-07-25 NOTE — NURSING NOTE
"Chief Complaint   Patient presents with     Pre-Op Exam       Initial /70 (BP Location: Right arm, Patient Position: Chair, Cuff Size: Adult Regular)  Pulse 80  Temp 97  F (36.1  C) (Tympanic)  Ht 5' 3\" (1.6 m)  Wt 165 lb (74.8 kg)  SpO2 98%  BMI 29.23 kg/m2 Estimated body mass index is 29.23 kg/(m^2) as calculated from the following:    Height as of this encounter: 5' 3\" (1.6 m).    Weight as of this encounter: 165 lb (74.8 kg).  Medication Reconciliation: complete     Marichuy Kay MA        "

## 2017-07-25 NOTE — PROGRESS NOTES
[facility]  [address] [place] [address] [phone]  Dept: [phone]    PRE-OP EVALUATION:  Today's date: 2017    Janine Cornell (: 1965) presents for pre-operative evaluation assessment as requested by Dr. Charis Holbrook.  She requires evaluation and anesthesia risk assessment prior to undergoing surgery/procedure for treatment of right eye mass  Proposed procedure: biopsy     Date of Surgery/ Procedure: 2017  Time of Surgery/ Procedure: [facility]  Hospital/Surgical Facility: [facility]  Fax number for surgical facility: [phone]  Primary Physician: Kash Solano  Type of Anesthesia Anticipated: to be determined    Patient has a Health Care Directive or Living Will:  NO    Preop Questions 2017   1.  Do you have a history of heart attack, stroke, stent, bypass or surgery on an artery in the head, neck, heart or legs? YES - STEMI s/p stent    2.  Do you ever have any pain or discomfort in your chest? YES - see below    3.  Do you have a history of  Heart Failure? No   4.   Are you troubled by shortness of breath when:  walking on a level surface, or up a slight hill, or at night? YES - see below    5.  Do you currently have a cold, bronchitis or other respiratory infection? No   6.  Do you have a cough, shortness of breath, or wheezing? YES - see below    7.  Do you sometimes get pains in the calves of your legs when you walk? YES - sometimes when walking    8. Do you or anyone in your family have previous history of blood clots? No   9.  Do you or does anyone in your family have a serious bleeding problem such as prolonged bleeding following surgeries or cuts? No   10. Have you ever had problems with anemia or been told to take iron pills? YES - taking iron pills    11. Have you had any abnormal blood loss such as black, tarry or bloody stools, or abnormal vaginal bleeding? YES - vaginal bleeding    12. Have you ever had a blood  transfusion? No   13. Have you or any of your relatives ever had problems with anesthesia? YES - vomiting and nausea    14. Do you have sleep apnea, excessive snoring or daytime drowsiness? YES - daytime drowsiness    15. Do you have any prosthetic heart valves? No   16. Do you have prosthetic joints? No   17. Is there any chance that you may be pregnant? No           HPI:                                                      Brief HPI related to upcoming procedure: right eye mass - She has had previous biopsy of the eye. She is currently experiencing headaches, blurry vision and eye drainage. She was experiencing bad headaches, numbness of right side of the face, blurry vision and nasal drainage.       DIABETES - Patient has a longstanding history of DiabetesType Type II . Patient is being treated with oral agents and insulin injections and denies significant side effects. Control has been fair. Complicating factors include but are not limited to: high cholesterol and HTN. Pt takes long acting in the morning.                                                                                                                .  HYPERTENSION - Patient has longstanding history of  HTN , currently denies any symptoms referable to elevated blood pressure. She has a chronic history of dyspnea 2/2  NSTEMI. Over the last week, she had to take nitroglycerin twice due to chest pain. Symptoms improved after taking medication. Pt currently denies leg swelling. Specifically denies palpitations, orthopnea or PND. Blood pressure readings have been in normal range. Current medication regimen is as listed below. Patient denies any side effects of medication.                                                                                                                                                                                            .  HYPERLIPIDEMIA - Patient has a long history of significant Hyperlipidemia requiring medication  for treatment with recent good control. Patient reports no problems or side effects with the medication.                                                                                                                                                       .  SAD - Patient has a long history of SAD of moderate severity requiring medication for control with recent symptoms being stable..Current symptoms of depression include none.                                                                                                                                                                                    .  ANEMIA - Patient has a recent history of moderate severity anemia, which has not been symptomatic. *.                                                                                                                     .  ASTHMA - Patient has a longstanding history of Asthma . Patient has been doing well overall noting SOB and continues on medication regimen.                                                                                                                                      .  NSTEMI s/p stent placement )11/2011) - Patient has a longstanding history of CAD.         Seronegative RA and fibromyalgia: Worse due to the humidity.    Chronic pancreatitis - Pt is on bentanyl and antiemetics. Pt has been experiencing upper abdominal pain which started two weeks ago.     GERD -  Improved due to decreased appetite.                                                                                                                     .    MEDICAL HISTORY:                                                    Patient Active Problem List    Diagnosis Date Noted     CAD S/P percutaneous coronary angioplasty 02/09/2016     Priority: Medium     Status post coronary angiogram 02/09/2016     Priority: Medium     Telogen effluvium 11/17/2015     Priority: Medium     Tobacco use disorder 10/29/2015     Priority: Medium      Overweight with body mass index (BMI) 25.0-29.9 10/20/2015     Priority: Medium     Type 2 diabetes mellitus with other specified complication (H) 10/19/2015     Priority: Medium     Hyperlipidemia associated with type 2 diabetes mellitus (H) 10/19/2015     Priority: Medium     Hypertension associated with diabetes (H) 10/19/2015     Priority: Medium     Type 2 diabetes, HbA1C goal < 8% (H) 07/17/2014     Priority: Medium     Goal < 8 based on ASCVD/accord study       Seronegative rheumatoid arthritis (H) 01/24/2014     Priority: Medium     Fibromyalgia 10/20/2013     Priority: Medium     Spinal stenosis in cervical region 08/19/2013     Priority: Medium     Breast cancer screening 07/31/2013     Priority: Medium     Vitamin D deficiency 11/30/2012     Priority: Medium     Costochondritis      Priority: Medium     1/12       Uterine leiomyoma 06/08/2012     Priority: Medium     (Problem list name updated by automated process. Provider to review and confirm.)       Iron deficiency anemia 06/08/2012     Priority: Medium     Ischemic cardiomyopathy 02/02/2012     Priority: Medium     EF 35-40% 10/31/11 with acute NSTEMI.  Persistent mid septal and basal inferior hypokinesis with normal LV EF on f/u echo 1/20/12.       Hypertensive heart disease 02/02/2012     Priority: Medium     GERD (gastroesophageal reflux disease) 11/09/2011     Priority: Medium     NSTEMI (non-ST elevated myocardial infarction) (H) 11/01/2011     Priority: Medium     Jovan-septal MI       Hyperlipidemia LDL goal <70 11/01/2011     Priority: Medium     -Lipids 11/1/11:  , , , HDL 32       ASCVD (arteriosclerotic cardiovascular disease) 11/01/2011     Priority: Medium     PCI with stenting of LAD, D2, and prox RCA 11/2/11.   (Right dominant system with no left main dz.)       Common migraine 06/01/2011     Priority: Medium     (Problem list name updated by automated process. Provider to review and confirm.)       Hypertension  goal BP (blood pressure) < 140/90      Priority: Medium     Chronic pancreatitis (H)      Priority: Medium     10/08:  idiopathic, Dr. Hidaglo, Dr. Marcus, Dr. Preston Pastrana. Tx: PPI, antioxidants, pancreatic enzymes.    Has had an extensive workup of her chronic postprandial abdominal pain, bloating, and early satiety including gastric emptying study (3/09) and MRI/MRCP (3/09) in addition to EGD (10/08) and endoscopic ultrasound (11/08).  MRCP was suggestive of idiopathic chronic pancreatitis.   I do have records from Dr. Marcus at [facility].  She was referred to him by Dr. Hidalgo and Dr. Pastrana (though I don't have their records).  She was prescribed antioxidant vitamins and pancreatic enzymes as well as her PPI.  She states she had an allergic reaction to the enzymes (creon) which she attributes to the fact that there is pork in the capsules.  She is frustrated that no one has cured her of her pain.         PCOS (polycystic ovarian syndrome)      Priority: Medium     Moderate persistent asthma      Priority: Medium     Seasonal affective disorder (H)      Priority: Medium     SAD       Allergic rhinitis      Priority: Medium     Allergy, airborne subst        Past Medical History:   Diagnosis Date     Abnormal glandular Papanicolaou smear of cervix 1992     Allergic rhinitis     Allergy, airborne subst     Arthritis      ASCVD (arteriosclerotic cardiovascular disease)      Chronic pain      Chronic pancreatitis (H)     idiopathic, Tx: PPI, antioxidants, pancreatic enzymes     Common migraine      Coronary artery disease      Costochondritis      Costochondritis      Difficulty in walking(719.7)      Ectasia, mammary duct     [facility], persistent nipple discharge     Elevated fasting glucose      Gastro-oesophageal reflux disease      Hernia      Hyperlipidemia LDL goal < 100      Hypertension goal BP (blood pressure) < 140/90     Essential hypertension     Iron deficiency anemia      Ischemic  cardiomyopathy      Menorrhagia      Migraine headaches      Mild persistent asthma      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 Matias, neg w/u, Rx'd w/prednisone     Ventral hernia, unspecified, without mention of obstruction or gangrene 2012     Past Surgical History:   Procedure Laterality Date     C ECHO HEART XTHORACIC,COMPLETE, W/O DOPPLER  04    [facility] WNL, EF 60%     C/SECTION, LOW TRANSVERSE           CARDIAC SURGERY      cardiac stent- recent in 16; 4 other stents     DILATION AND CURETTAGE N/A 2016    Procedure: DILATION AND CURETTAGE;  Surgeon: Nahed Butler MD;  Location: [facility] OR     HC UGI ENDOSCOPY W EUS  08    Dr. Pastrana, possible chronic pancreatitis, fatty liver     HERNIA REPAIR  2012    done at [facility]     INSERT INTRAUTERINE DEVICE N/A 2016    Procedure: INSERT INTRAUTERINE DEVICE;  Surgeon: Nahed Butler MD;  Location: [facility] OR     INT UTERINE FIBRIOD EMBOLIZATION  10/29/2014     LAPAROSCOPIC CHOLECYSTECTOMY  08    Dr. Arnol GRUBBS TX, CERVICAL      s/p LEEP     ORBITOTOMY Right 3/15/2016    Procedure: ORBITOTOMY;  Surgeon: Myron Cyr MD;  [facility]: [facility]     UPPER GI ENDOSCOPY  10/21/08    mild gastritis, Dr. Hidalgo     Current Outpatient Prescriptions   Medication      COMPRESSION STOCKINGS     COMPRESSION STOCKINGS     fluconazole (DIFLUCAN) 200 MG tablet     hydrALAZINE (APRESOLINE) 25 MG tablet     insulin pen needle (BD MARCK U/F) 32G X 4 MM     insulin glargine (BASAGLAR KWIKPEN) 100 UNIT/ML injection     ranitidine (ZANTAC) 150 MG tablet     lidocaine (XYLOCAINE) 5 % ointment     traMADol (ULTRAM) 50 MG tablet     hydroxychloroquine (PLAQUENIL) 200 MG tablet     ferrous gluconate (FERGON) 324 (38 FE) MG tablet     ASPIRIN LOW DOSE 81 MG EC tablet     blood glucose monitoring (ONE TOUCH DELICA) lancets     vitamin D (ERGOCALCIFEROL) 37364 UNIT capsule     metoprolol (TOPROL-XL) 100 MG 24 hr tablet     clopidogrel (PLAVIX) 75 MG tablet     lisinopril-hydrochlorothiazide (PRINZIDE/ZESTORETIC) 20-25 MG per tablet     blood glucose monitoring (NO BRAND SPECIFIED) meter device kit     blood glucose monitoring (NO BRAND SPECIFIED) test strip     diphenhydrAMINE (BENADRYL) 25 MG capsule     EPIPEN 2-RIKY 0.3 MG/0.3ML injection     AEROSPAN 80 MCG/ACT AERS     fluticasone (FLONASE) 50 MCG/ACT spray     Magnesium Oxide -Mg Supplement 250 MG TABS     nystatin (MYCOSTATIN) 738588 UNITS TABS tablet     albuterol (2.5 MG/3ML) 0.083% neb solution     dicyclomine (BENTYL) 20 MG tablet     sucralfate (CARAFATE) 1 GM tablet     fluocinolone (SYNALAR) 0.01 % solution     mometasone (NASONEX) 50 MCG/ACT spray     cyclobenzaprine (FLEXERIL) 10 MG tablet     Ondansetron HCl (ZOFRAN PO)     pravastatin (PRAVACHOL) 40 MG tablet     spironolactone (ALDACTONE) 25 MG tablet     metFORMIN (GLUCOPHAGE-XR) 500 MG 24 hr tablet     montelukast (SINGULAIR) 10 MG tablet     acetaminophen (TYLENOL) 325 MG tablet     metroNIDAZOLE (NORITATE) 1 % cream     desonide (DESOWEN) 0.05 % cream     ketoconazole (NIZORAL) 2 % shampoo     fluocinonide (LIDEX) 0.05 % external solution     nitroglycerin (NITROSTAT) 0.4 MG SL tablet     albuterol (PROAIR HFA, PROVENTIL HFA, VENTOLIN HFA) 108 (90 BASE) MCG/ACT inhaler      "calcium carbonate (TUMS) 500 MG chewable tablet     fexofenadine (ALLEGRA) 180 MG tablet     olopatadine (PATANOL) 0.1 % ophthalmic solution     No current facility-administered medications for this visit.        OTC products: None, except as noted above    Allergies   Allergen Reactions     Amoxicillin-Pot Clavulanate      Augmentin      Unknown possible hives and edema     Azithromycin      Diatrizoate Other (See Comments)     Pt wants this listed because she is allergic to shellfish      Imitrex [Sumatriptan]      Severe face/neck/chest tightness and flushing side effects      Penicillins Hives     Unknown      Pork Allergy      Stomach pain, cramping, diarrhea, itching, nausea and headaches     Shellfish Allergy Hives and Swelling     Sulfa Drugs Hives and Swelling     Zithromax [Azithromycin Dihydrate] Swelling     Unknown       Latex Allergy: NO    Social History   Substance Use Topics     Smoking status: Former Smoker     Packs/day: 0.20     Years: 1.00     Types: Cigarettes     Quit date: 2/1/2016     Smokeless tobacco: Never Used     Alcohol use No     History   Drug Use No       REVIEW OF SYSTEMS:                                                    Constitutional, neuro, ENT, endocrine, pulmonary, cardiac, gastrointestinal, genitourinary, musculoskeletal, integument and psychiatric systems are negative, except as otherwise noted.      EXAM:                                                    /70 (BP Location: Right arm, Patient Position: Chair, Cuff Size: Adult Regular)  Pulse 80  Temp 97  F (36.1  C) (Tympanic)  Ht 5' 3\" (1.6 m)  Wt 165 lb (74.8 kg)  SpO2 98%  BMI 29.23 kg/m2    GENERAL APPEARANCE: healthy, alert and no distress     EYES: EOMI, PERRL     HENT:  nose and mouth without ulcers or lesions     NECK: no adenopathy     RESP: lungs clear to auscultation - no rales, rhonchi or wheezes     CV: regular rates and rhythm, normal S1 S2     ABDOMEN:  soft, + epigastric tender, no HSM or masses " and bowel sounds normal, no guarding, no ridigity      MS: extremities normal- no gross deformities noted, no evidence of inflammation in joints, FROM in all extremities.     SKIN: no suspicious lesions or rashes     NEURO: Normal strength and tone, sensory exam grossly normal, mentation intact and speech normal     PSYCH: mentation appears normal. and affect normal/bright     LYMPHATICS: No cervical nodes    DIAGNOSTICS:                                                      EKG: no acute ST/T changes c/w ischemia, unchanged from previous tracings  Labs Resulted Today:   Results for orders placed or performed in visit on 07/25/17   CBC with platelets   Result Value Ref Range    WBC 11.0 4.0 - 11.0 10e9/L    RBC Count 5.21 (H) 3.8 - 5.2 10e12/L    Hemoglobin 13.9 11.7 - 15.7 g/dL    Hematocrit 40.6 35.0 - 47.0 %    MCV 78 78 - 100 fl    MCH 26.7 26.5 - 33.0 pg    MCHC 34.2 31.5 - 36.5 g/dL    RDW 13.1 10.0 - 15.0 %    Platelet Count 334 150 - 450 10e9/L   Lipase   Result Value Ref Range    Lipase 228 73 - 393 U/L   Comprehensive metabolic panel (BMP + Alb, Alk Phos, ALT, AST, Total. Bili, TP)   Result Value Ref Range    Sodium 130 (L) 133 - 144 mmol/L    Potassium 4.0 3.4 - 5.3 mmol/L    Chloride 94 94 - 109 mmol/L    Carbon Dioxide 26 20 - 32 mmol/L    Anion Gap 10 3 - 14 mmol/L    Glucose 258 (H) 70 - 99 mg/dL    Urea Nitrogen 24 7 - 30 mg/dL    Creatinine 1.28 (H) 0.52 - 1.04 mg/dL    GFR Estimate 44 (L) >60 mL/min/1.7m2    GFR Estimate If Black 53 (L) >60 mL/min/1.7m2    Calcium 10.2 (H) 8.5 - 10.1 mg/dL    Bilirubin Total 0.5 0.2 - 1.3 mg/dL    Albumin 4.6 3.4 - 5.0 g/dL    Protein Total 8.3 6.8 - 8.8 g/dL    Alkaline Phosphatase 82 40 - 150 U/L    ALT 21 0 - 50 U/L    AST 11 0 - 45 U/L       Sodium 134  133 - 144 mmol/L Final 07/31/2017  3:46 PM 1740   Potassium 4.0  3.4 - 5.3 mmol/L Final 07/31/2017  3:46 PM 1740   Chloride 99  94 - 109 mmol/L Final 07/31/2017  3:46 PM 1740   Carbon Dioxide 26  20 - 32 mmol/L  Final 07/31/2017  3:46 PM 1740   Anion Gap 8  3 - 14 mmol/L Final 07/31/2017  3:46 PM 1740   Glucose 150 (H) 70 - 99 mg/dL Final 07/31/2017  3:46 PM 1740   Urea Nitrogen 28  7 - 30 mg/dL Final 07/31/2017  3:46 PM 1740   Creatinine 1.04  0.52 - 1.04 mg/dL Final 07/31/2017  3:46 PM 1740   GFR Estimate 56 (L) >60 mL/min/1.7m2 Final 07/31/2017  3:46 PM 1740   Comment:   Non  GFR Calc   GFR Estimate If Black 68  >60 mL/min/1.7m2 Final 07/31/2017  3:46 PM 1740   Comment:   African American GFR Calc   Calcium 9.6  8.5 - 10.1 mg/dL Final 07/31/2017  3:46 PM 1740         Echo 07/31/17  Interpretation Summary  Normal exercise echocardiogram without evidence of inducible ischemia.  Target heart rate was achieved. Heart rate and blood pressure response to  exercise were normal. With stress, the left ventricular ejection fraction  increased from 55-60% to greater than 65% and the left ventricular size  decreased appropriately.  Normal functional capacity. No subjective symptoms to suggest ischemia.  There was no ECG evidence of ischemia.  No significant valve disease on screening doppler evaluation. The aortic root  and visualized ascending aorta are normal.  _____________________________________________________________________________    IMPRESSION:                                                    Reason for surgery/procedure: eye mass   Diagnosis/reason for consult: pre-op    The proposed surgical procedure is considered INTERMEDIATE risk.    REVISED CARDIAC RISK INDEX  The patient has the following serious cardiovascular risks for perioperative complications such as (MI, PE, VFib and 3  AV Block):  Coronary Artery Disease (MI, positive stress test, angina, Qs on EKG)  Diabetes Mellitus (on Insulin)  INTERPRETATION: 2 risks: Class III (moderate risk - 6.6% complication rate)    The patient has the following additional risks for perioperative complications:  No identified additional risks      ICD-10-CM    1.  Preop general physical exam Z01.818        RECOMMENDATIONS:                                                      --Consult hospital rounder / IM to assist post-op medical management    Cardiovascular Risk  Patient is already on a Beta Blocker. Continue Betablocker therapy after surgery, using Beta blocker order set as necessary for NPO status.    --Patient is to take all scheduled medications on the day of surgery EXCEPT for modifications listed below.    Diabetes Medication Use  -----Hold usual  oral diabetic meds (e.g. Metformin, Actos, Glipizide) while NPO.   -----Take 80% of long acting insulin (e.g. Lantus, NPH) while NPO (fasting)  -----Use usual sliding scale correction of insulin while NPO (fasting)        APPROVAL GIVEN to proceed with proposed procedure, without further diagnostic evaluation       ## Seasonal affective disorder  - stable     ## NSTEMI (non-ST elevated myocardial infarction), CAD S/P percutaneous coronary angioplasty  - Normal exercise echocardiogram without evidence of inducible ischemia.  - Per pt cardiologist recommended holding asprin and plavix for one wek     ## Type 2 diabetes mellitus with other specified complication  - Lantus 32 U in the morning. Hold Metformin.     ## Seronegative rheumatoid arthritis   - continue plaquenil     ## Chronic pancreatitis, unspecified pancreatitis type   - Lipase WNL    ## Moderate persistent asthma without complication  - stable     ## Hypertension goal BP (blood pressure) < 140/90  - stable, pt takes antihypertensives at night     ## Hyperlipidemia LDL goal <70  - stable, pt takes statin at night     ## Gastroesophageal reflux disease, esophagitis presence not specified  - stable     ## Hyponatremia  - Basic metabolic panel; Future  - repeat BMP normal       Signed Electronically by: Gabriel Greer MD    Copy of this evaluation report is provided to requesting physician.    [facility] Preop Guidelines

## 2017-07-25 NOTE — MR AVS SNAPSHOT
After Visit Summary   7/25/2017    Janine Cornell    MRN: [id number]           Patient Information     Date Of Birth          1965        Visit Information        Provider Department      7/25/2017 4:00 PM Gabriel Greer MD [facility]        Today's Diagnoses     Preop general physical exam    -  1    Mass of eye, right        Seasonal affective disorder (H)        NSTEMI (non-ST elevated myocardial infarction) (H)        Type 2 diabetes mellitus with other specified complication (H)        Chronic pancreatitis, unspecified pancreatitis type (H)        Moderate persistent asthma without complication        Hypertension goal BP (blood pressure) < 140/90        Hyperlipidemia LDL goal <70        Seronegative rheumatoid arthritis (H)        CAD S/P percutaneous coronary angioplasty        Gastroesophageal reflux disease, esophagitis presence not specified          Care Instructions    Hold Aspirin and plavix one week before surgery (per your Cardiologist).    Take your blood pressure medications with small sip of water.     Basaglar 32 U in the morning. Hold Metformin.     I will look into whether you need to hold your plaquenil and mychart message you.     Hold all your other medications on the morning of the surgery.     Nothing by mouth after midnight.     No advil or aleve 3 days before surgery.     Please schedule echocardiogram before surgery. Please call [phone] to schedule imaging.       Before Your Surgery      Call your surgeon if there is any change in your health. This includes signs of a cold or flu (such as a sore throat, runny nose, cough, rash or fever).    Do not smoke, drink alcohol or take over the counter medicine (unless your surgeon or primary care doctor tells you to) for the 24 hours before and after surgery.    If you take prescribed drugs: Follow your doctor s orders about which medicines to take and which to stop until after surgery.    Eating and  drinking prior to surgery: follow the instructions from your surgeon    Take a shower or bath the night before surgery. Use the soap your surgeon gave you to gently clean your skin. If you do not have soap from your surgeon, use your regular soap. Do not shave or scrub the surgery site.  Wear clean pajamas and have clean sheets on your bed.           Follow-ups after your visit        Your next 10 appointments already scheduled     Aug 04, 2017   Procedure with Charis Holbrook MD   [facility]iOP [facility] (--)    [address]  [facility] [address]   [phone]            Aug 15, 2017  1:45 PM CDT   (Arrive by 1:30 PM)   Post-Op with Charis Holbrook MD   [facility] ([facility])    [address] [place] [address]  4th [facility] [id number] [phone]            Dec 20, 2017  3:30 PM CST   Lab with  LAB   [facility] Lab ([facility])    [address] [place] [id number] [phone]            Dec 20, 2017  4:00 PM CST   (Arrive by 3:45 PM)   Return Visit with Milan Peace MD   [facility] ([facility])    [address] [place] [address]  3rd [facility] [id number] [phone]              Future tests that were ordered for you today     Open Future Orders        Priority Expected Expires Ordered    Exercise Stress Echocardiogram Routine  7/25/2018 7/25/2017            Who to contact     If you have questions or need follow up information about today's clinic visit or your schedule please contact [facility] directly at [phone].  Normal or non-critical lab and imaging results will be communicated to you by MyChart, letter or phone within 4 business days after the clinic has received the results. If you do not hear from us within 7 days, please contact the clinic through MyChart or phone. If you have a critical or abnormal lab result, we will  "notify you by phone as soon as possible.  Submit refill requests through [employer] or call your pharmacy and they will forward the refill request to us. Please allow 3 business days for your refill to be completed.          Additional Information About Your Visit        [employer] Information     [employer] gives you secure access to your electronic health record. If you see a primary care provider, you can also send messages to your care team and make appointments. If you have questions, please call your primary care clinic.  If you do not have a primary care provider, please call [phone] and they will assist you.        Care EveryWhere ID     This is your Care EveryWhere ID. This could be used by other organizations to access your [facility] medical records  [id number]        Your Vitals Were     Pulse Temperature Height Pulse Oximetry BMI (Body Mass Index)       80 97  F (36.1  C) (Tympanic) 5' 3\" (1.6 m) 98% 29.23 kg/m2        Blood Pressure from Last 3 Encounters:   07/25/17 108/70   06/28/17 125/81   06/15/17 120/82    Weight from Last 3 Encounters:   07/25/17 165 lb (74.8 kg)   06/28/17 174 lb 11.2 oz (79.2 kg)   06/15/17 168 lb (76.2 kg)              We Performed the Following     CBC with platelets     Comprehensive metabolic panel (BMP + Alb, Alk Phos, ALT, AST, Total. Bili, TP)     EKG 12-lead complete w/read - Clinics     Lipase          Today's Medication Changes          These changes are accurate as of: 7/25/17  5:12 PM.  If you have any questions, ask your nurse or doctor.               These medicines have changed or have updated prescriptions.        Dose/Directions    fluconazole 200 MG tablet   Commonly known as:  DIFLUCAN   This may have changed:  Another medication with the same name was removed. Continue taking this medication, and follow the directions you see here.   Used for:  Thrush   Changed by:  Majo Mckinnon MD        Dose:  200 mg   Take 1 tablet (200 mg) by mouth daily   Quantity:  " 10 tablet   Refills:  0       hydrALAZINE 25 MG tablet   Commonly known as:  APRESOLINE   This may have changed:    - when to take this  - additional instructions   Used for:  Essential hypertension        Dose:  12.5 mg   Take 0.5 tablets (12.5 mg) by mouth 3 times daily as needed , for systolic blood pressure >140 mmHg.   Quantity:  90 tablet   Refills:  3       vitamin D 32241 UNIT capsule   Commonly known as:  ERGOCALCIFEROL   This may have changed:  additional instructions   Used for:  Vitamin D deficiency        Dose:  60251 Units   Take 1 capsule (50,000 Units) by mouth every 7 days Need a Vitamin D level in 2 months.   Quantity:  8 capsule   Refills:  0         Stop taking these medicines if you haven't already. Please contact your care team if you have questions.     azelastine 0.1 % spray   Commonly known as:  ASTELIN   Stopped by:  Gabriel Greer MD           Ketoprofen Powd   Stopped by:  Gabriel Greer MD                    Primary Care Provider Office Phone # Fax #    Kash Solano [phone]       [facility] [address] [place] [id number]        Equal Access to Services     [facility] AH: Hadii frederick ku hadasho Soomaali, waaxda luqadaha, qaybta kaalmada adedarshanayada, gagandeep phillip . So [facility] [phone].    ATENCIÓN: Si habla español, tiene a burch disposición servicios gratuitos de asistencia lingüística. [facility] [phone].    We comply with applicable federal civil rights laws and [place] laws. We do not discriminate on the basis of race, color, national origin, age, disability sex, sexual orientation or gender identity.            Thank you!     Thank you for choosing [facility]  for your care. Our goal is always to provide you with excellent care. Hearing back from our patients is one way we can continue to improve our services. Please take a few minutes to complete the written survey that you may  receive in the mail after your visit with us. Thank you!             Your Updated Medication List - Protect others around you: Learn how to safely use, store and throw away your medicines at www.disposemymeds.org.          This list is accurate as of: 7/25/17  5:12 PM.  Always use your most recent med list.                   Brand Name Dispense Instructions for use Diagnosis    acetaminophen 325 MG tablet    TYLENOL    250 tablet    Take 1-2 tablets (325-650 mg) by mouth every 6 hours as needed for pain (headache)    Other chronic pain, Headache(784.0)       AEROSPAN 80 MCG/ACT Aers oral inhaler   Generic drug:  flunisolide HFA      INHALE 2 PUFFS PO BID.  RINSE MOUTH AFTERWARDS        * albuterol 108 (90 BASE) MCG/ACT Inhaler    PROAIR HFA/PROVENTIL HFA/VENTOLIN HFA    3 Inhaler    Inhale 2 puffs into the lungs every 6 hours as needed for shortness of breath / dyspnea or wheezing        * albuterol (2.5 MG/3ML) 0.083% neb solution      INL 1 VIAL VIA NEBULIZATION Q 4 TO 6 HOURS PRN        ASPIRIN LOW DOSE 81 MG EC tablet   Generic drug:  aspirin     90 tablet    TAKE 1 TABLET BY MOUTH EVERY DAY    Cardiomyopathy, unspecified (H)       blood glucose monitoring lancets     4 Box    Use to test blood sugars 4 times daily or as directed.    Type 2 diabetes, HbA1c goal < 7% (H)       blood glucose monitoring meter device kit    no brand specified    1 kit    Use to test blood sugar 4 X times daily or as directed.    Type 2 diabetes, HbA1c goal < 7% (H)       blood glucose monitoring test strip    no brand specified    360 each    1 strip by In Vitro route 4 times daily Test as directed. Please dispense three months and three months of refills. Thank you.    Type 2 diabetes, HbA1c goal < 7% (H)       clopidogrel 75 MG tablet    PLAVIX    90 tablet    Take 1 tablet (75 mg) by mouth daily        * COMPRESSION STOCKINGS     4 each    2 each daily    Bilateral lower extremity edema, Venous incompetence       * COMPRESSION  STOCKINGS     4 each    2 each daily Apply one 10-15 mmHg compression stocking to each lower extgmierty during the day and remove before bedtime.    Venous incompetence, Bilateral lower extremity edema       cyclobenzaprine 10 MG tablet    FLEXERIL    180 tablet    Take 1 tablet (10 mg) by mouth 2 times daily as needed for muscle spasms    Fibromyalgia       desonide 0.05 % cream    DESOWEN     Apply topically 2 times daily        dicyclomine 20 MG tablet    BENTYL    60 tablet    Take 1 tablet (20 mg) by mouth 4 times daily as needed    Other irritable bowel syndrome       diphenhydrAMINE 25 MG capsule    BENADRYL     TK 1 TO 2 CS PO QHS        EPIPEN 2-RIKY 0.3 MG/0.3ML injection 2-pack   Generic drug:  EPINEPHrine      INJECT 0.3 MG INTO THE MUSCLE PRF ANAPHYALAXIS        ferrous gluconate 324 (38 FE) MG tablet    FERGON    180 tablet    Take 1 tablet (324 mg) by mouth 2 times daily    AILEEN (iron deficiency anemia)       fexofenadine 180 MG tablet    ALLEGRA    90 tablet    Take 1 tablet by mouth daily as needed.    Chronic rhinitis       fluconazole 200 MG tablet    DIFLUCAN    10 tablet    Take 1 tablet (200 mg) by mouth daily    Thrush       fluocinolone 0.01 % solution    SYNALAR     Apply topically daily as needed        fluocinonide 0.05 % solution    LIDEX    60 mL    Apply topically 2 times daily To areas of itch on the scalp as needed.    Telogen effluvium       fluticasone 50 MCG/ACT spray    FLONASE     U 2 SPRAYS IEN D.        hydrALAZINE 25 MG tablet    APRESOLINE    90 tablet    Take 0.5 tablets (12.5 mg) by mouth 3 times daily as needed , for systolic blood pressure >140 mmHg.    Essential hypertension       hydroxychloroquine 200 MG tablet    PLAQUENIL    180 tablet    Take 2 tablets (400 mg) by mouth daily EYE EXAM DUE/LETTER SENT    Inflammatory arthritis       insulin glargine 100 UNIT/ML injection     18 mL    Inject 40 Units Subcutaneous daily    Type 2 diabetes mellitus without complication  (H)       insulin pen needle 32G X 4 MM    BD MARCK U/F    300 each    USE DAILY OR AS DIRECTED    Type 2 diabetes, HbA1c goal < 7% (H)       ketoconazole 2 % shampoo    NIZORAL     Apply topically daily as needed        lidocaine 5 % ointment    XYLOCAINE    50 g    Apply 1 g topically 2 times daily as needed for moderate pain    Fibromyalgia, Rheumatoid arthritis involving both wrists with positive rheumatoid factor (H)       lisinopril-hydrochlorothiazide 20-25 MG per tablet    PRINZIDE/ZESTORETIC    90 tablet    TAKE 1 TABLET BY MOUTH DAILY    HTN (hypertension)       Magnesium Oxide -Mg Supplement 250 MG Tabs      TK 1 T PO BID. REDUCE IF STOOLS LOOSEN        metFORMIN 500 MG 24 hr tablet    GLUCOPHAGE-XR    60 tablet    Take 2 tablets (1,000 mg) by mouth daily (with dinner)    Type 2 diabetes mellitus not at goal (H)       metoprolol 100 MG 24 hr tablet    TOPROL-XL    90 tablet    Take 1 tablet (100 mg) by mouth daily    Coronary artery disease involving native heart, angina presence unspecified, unspecified vessel or lesion type       metroNIDAZOLE 1 % cream    NORITATE     Apply topically daily        mometasone 50 MCG/ACT spray    NASONEX     Spray 2 sprays into both nostrils daily        montelukast 10 MG tablet    SINGULAIR    30 tablet    Take 1 tablet (10 mg) by mouth At Bedtime    Uncomplicated asthma, unspecified asthma severity       nitroGLYcerin 0.4 MG sublingual tablet    NITROSTAT    25 tablet    Place 1 tablet (0.4 mg) under the tongue every 5 minutes as needed for chest pain    NSTEMI (non-ST elevated myocardial infarction) (H)       nystatin 754484 UNITS Tabs tablet    MYCOSTATIN     TK 2 TS PO BID        olopatadine 0.1 % ophthalmic solution    PATANOL    5 mL    Place 1 drop into both eyes 2 times daily as needed for allergies.    Chronic rhinitis       pravastatin 40 MG tablet    PRAVACHOL    30 tablet    Take 1 tablet (40 mg) by mouth daily    CAD (coronary artery disease)       ranitidine  150 MG tablet    ZANTAC    60 tablet    Take 1 tablet (150 mg) by mouth 2 times daily    Gastritis       spironolactone 25 MG tablet    ALDACTONE    60 tablet    Take 2 tablets (50 mg) by mouth daily    Hypertension goal BP (blood pressure) < 140/90       sucralfate 1 GM tablet    CARAFATE    120 tablet    Take 1 tablet (1 g) by mouth 4 times daily as needed    Abdominal pain, epigastric       traMADol 50 MG tablet    ULTRAM    60 tablet    Take 1 tablet (50 mg) by mouth every 8 hours as needed for moderate pain    Undifferentiated connective tissue disease (H)       TUMS 500 MG chewable tablet   Generic drug:  calcium carbonate      Take 3-4 chew tab by mouth daily as needed.        vitamin D 52228 UNIT capsule    ERGOCALCIFEROL    8 capsule    Take 1 capsule (50,000 Units) by mouth every 7 days Need a Vitamin D level in 2 months.    Vitamin D deficiency       ZOFRAN PO           * Notice:  This list has 4 medication(s) that are the same as other medications prescribed for you. Read the directions carefully, and ask your doctor or other care provider to review them with you.

## 2017-07-26 LAB
ALBUMIN SERPL-MCNC: 4.6 G/DL (ref 3.4–5)
ALP SERPL-CCNC: 82 U/L (ref 40–150)
ALT SERPL W P-5'-P-CCNC: 21 U/L (ref 0–50)
ANION GAP SERPL CALCULATED.3IONS-SCNC: 10 MMOL/L (ref 3–14)
AST SERPL W P-5'-P-CCNC: 11 U/L (ref 0–45)
BILIRUB SERPL-MCNC: 0.5 MG/DL (ref 0.2–1.3)
BUN SERPL-MCNC: 24 MG/DL (ref 7–30)
CALCIUM SERPL-MCNC: 10.2 MG/DL (ref 8.5–10.1)
CHLORIDE SERPL-SCNC: 94 MMOL/L (ref 94–109)
CO2 SERPL-SCNC: 26 MMOL/L (ref 20–32)
CREAT SERPL-MCNC: 1.28 MG/DL (ref 0.52–1.04)
GFR SERPL CREATININE-BSD FRML MDRD: 44 ML/MIN/1.7M2
GLUCOSE SERPL-MCNC: 258 MG/DL (ref 70–99)
LIPASE SERPL-CCNC: 228 U/L (ref 73–393)
POTASSIUM SERPL-SCNC: 4 MMOL/L (ref 3.4–5.3)
PROT SERPL-MCNC: 8.3 G/DL (ref 6.8–8.8)
SODIUM SERPL-SCNC: 130 MMOL/L (ref 133–144)

## 2017-07-31 ENCOUNTER — HOSPITAL ENCOUNTER (OUTPATIENT)
Dept: CARDIOLOGY | Facility: CLINIC | Age: 52
Discharge: HOME | End: 2017-07-31
Attending: FAMILY MEDICINE | Admitting: FAMILY MEDICINE
Payer: COMMERCIAL

## 2017-07-31 DIAGNOSIS — E87.1 HYPONATREMIA: ICD-10-CM

## 2017-07-31 DIAGNOSIS — Z01.818 PREOP GENERAL PHYSICAL EXAM: ICD-10-CM

## 2017-07-31 DIAGNOSIS — I21.4 NSTEMI (NON-ST ELEVATED MYOCARDIAL INFARCTION) (H): ICD-10-CM

## 2017-07-31 DIAGNOSIS — Z98.61 CAD S/P PERCUTANEOUS CORONARY ANGIOPLASTY: ICD-10-CM

## 2017-07-31 DIAGNOSIS — I25.10 CAD S/P PERCUTANEOUS CORONARY ANGIOPLASTY: ICD-10-CM

## 2017-07-31 LAB
ANION GAP SERPL CALCULATED.3IONS-SCNC: 8 MMOL/L (ref 3–14)
BUN SERPL-MCNC: 28 MG/DL (ref 7–30)
CALCIUM SERPL-MCNC: 9.6 MG/DL (ref 8.5–10.1)
CHLORIDE SERPL-SCNC: 99 MMOL/L (ref 94–109)
CO2 SERPL-SCNC: 26 MMOL/L (ref 20–32)
CREAT SERPL-MCNC: 1.04 MG/DL (ref 0.52–1.04)
GFR SERPL CREATININE-BSD FRML MDRD: 56 ML/MIN/1.7M2
GLUCOSE SERPL-MCNC: 150 MG/DL (ref 70–99)
POTASSIUM SERPL-SCNC: 4 MMOL/L (ref 3.4–5.3)
SODIUM SERPL-SCNC: 134 MMOL/L (ref 133–144)

## 2017-07-31 PROCEDURE — 93350 STRESS TTE ONLY: CPT | Mod: 26 | Performed by: INTERNAL MEDICINE

## 2017-07-31 PROCEDURE — 40000264 ECHO STRESS TEST WITH DEFINITY

## 2017-07-31 PROCEDURE — 40000141 ZZH STATISTIC PERIPHERAL IV START W/O US GUIDANCE

## 2017-07-31 PROCEDURE — 93018 CV STRESS TEST I&R ONLY: CPT | Performed by: INTERNAL MEDICINE

## 2017-07-31 PROCEDURE — 93321 DOPPLER ECHO F-UP/LMTD STD: CPT | Mod: 26 | Performed by: INTERNAL MEDICINE

## 2017-07-31 PROCEDURE — 93325 DOPPLER ECHO COLOR FLOW MAPG: CPT | Mod: 26 | Performed by: INTERNAL MEDICINE

## 2017-07-31 PROCEDURE — 93016 CV STRESS TEST SUPVJ ONLY: CPT | Performed by: INTERNAL MEDICINE

## 2017-07-31 PROCEDURE — 25000125 ZZHC RX 250: Performed by: INTERNAL MEDICINE

## 2017-07-31 RX ADMIN — ATROPINE SULFATE 0.8 MG: 0.4 INJECTION, SOLUTION INTRAMUSCULAR; INTRAVENOUS; SUBCUTANEOUS at 15:04

## 2017-08-03 ENCOUNTER — TELEPHONE (OUTPATIENT)
Dept: OPHTHALMOLOGY | Facility: CLINIC | Age: 52
End: 2017-08-03

## 2017-08-03 NOTE — TELEPHONE ENCOUNTER
I called patient who was upset because she still hadn't received a phone call from a pre-op nurse to go over her surgery instructions for tomorrow. I explained why we don't give times until 1-2 days prior to surgery and let her know that the nurses make calls all the way up to 5:00 and that if she doesn't receive a call by 3:00 she can call the number on her packet and see if she could speak to someone.    Nieves  [phone]

## 2017-08-03 NOTE — H&P (VIEW-ONLY)
[facility]  [address] [place] [address] [id number] [phone]  Dept: [phone]    PRE-OP EVALUATION:  Today's date: 2017    Janine Cornell (: 1965) presents for pre-operative evaluation assessment as requested by Dr. Charis Holbrook.  She requires evaluation and anesthesia risk assessment prior to undergoing surgery/procedure for treatment of right eye mass  Proposed procedure: biopsy     Date of Surgery/ Procedure: 2017  Time of Surgery/ Procedure: [facility]  Hospital/Surgical Facility: [facility]  Fax number for surgical facility: [phone]  Primary Physician: Kash Solano  Type of Anesthesia Anticipated: to be determined    Patient has a Health Care Directive or Living Will:  NO    Preop Questions 2017   1.  Do you have a history of heart attack, stroke, stent, bypass or surgery on an artery in the head, neck, heart or legs? YES - STEMI s/p stent    2.  Do you ever have any pain or discomfort in your chest? YES - see below    3.  Do you have a history of  Heart Failure? No   4.   Are you troubled by shortness of breath when:  walking on a level surface, or up a slight hill, or at night? YES - see below    5.  Do you currently have a cold, bronchitis or other respiratory infection? No   6.  Do you have a cough, shortness of breath, or wheezing? YES - see below    7.  Do you sometimes get pains in the calves of your legs when you walk? YES - sometimes when walking    8. Do you or anyone in your family have previous history of blood clots? No   9.  Do you or does anyone in your family have a serious bleeding problem such as prolonged bleeding following surgeries or cuts? No   10. Have you ever had problems with anemia or been told to take iron pills? YES - taking iron pills    11. Have you had any abnormal blood loss such as black, tarry or bloody stools, or abnormal vaginal bleeding? YES - vaginal bleeding    12. Have you ever had a blood  transfusion? No   13. Have you or any of your relatives ever had problems with anesthesia? YES - vomiting and nausea    14. Do you have sleep apnea, excessive snoring or daytime drowsiness? YES - daytime drowsiness    15. Do you have any prosthetic heart valves? No   16. Do you have prosthetic joints? No   17. Is there any chance that you may be pregnant? No           HPI:                                                      Brief HPI related to upcoming procedure: right eye mass - She has had previous biopsy of the eye. She is currently experiencing headaches, blurry vision and eye drainage. She was experiencing bad headaches, numbness of right side of the face, blurry vision and nasal drainage.       DIABETES - Patient has a longstanding history of DiabetesType Type II . Patient is being treated with oral agents and insulin injections and denies significant side effects. Control has been fair. Complicating factors include but are not limited to: high cholesterol and HTN. Pt takes long acting in the morning.                                                                                                                .  HYPERTENSION - Patient has longstanding history of  HTN , currently denies any symptoms referable to elevated blood pressure. She has a chronic history of dyspnea 2/2  NSTEMI. Over the last week, she had to take nitroglycerin twice due to chest pain. Symptoms improved after taking medication. Pt currently denies leg swelling. Specifically denies palpitations, orthopnea or PND. Blood pressure readings have been in normal range. Current medication regimen is as listed below. Patient denies any side effects of medication.                                                                                                                                                                                            .  HYPERLIPIDEMIA - Patient has a long history of significant Hyperlipidemia requiring medication  for treatment with recent good control. Patient reports no problems or side effects with the medication.                                                                                                                                                       .  SAD - Patient has a long history of SAD of moderate severity requiring medication for control with recent symptoms being stable..Current symptoms of depression include none.                                                                                                                                                                                    .  ANEMIA - Patient has a recent history of moderate severity anemia, which has not been symptomatic. *.                                                                                                                     .  ASTHMA - Patient has a longstanding history of Asthma . Patient has been doing well overall noting SOB and continues on medication regimen.                                                                                                                                      .  NSTEMI s/p stent placement )11/2011) - Patient has a longstanding history of CAD.         Seronegative RA and fibromyalgia: Worse due to the humidity.    Chronic pancreatitis - Pt is on bentanyl and antiemetics. Pt has been experiencing upper abdominal pain which started two weeks ago.     GERD -  Improved due to decreased appetite.                                                                                                                     .    MEDICAL HISTORY:                                                    Patient Active Problem List    Diagnosis Date Noted     CAD S/P percutaneous coronary angioplasty 02/09/2016     Priority: Medium     Status post coronary angiogram 02/09/2016     Priority: Medium     Telogen effluvium 11/17/2015     Priority: Medium     Tobacco use disorder 10/29/2015     Priority: Medium      Overweight with body mass index (BMI) 25.0-29.9 10/20/2015     Priority: Medium     Type 2 diabetes mellitus with other specified complication (H) 10/19/2015     Priority: Medium     Hyperlipidemia associated with type 2 diabetes mellitus (H) 10/19/2015     Priority: Medium     Hypertension associated with diabetes (H) 10/19/2015     Priority: Medium     Type 2 diabetes, HbA1C goal < 8% (H) 07/17/2014     Priority: Medium     Goal < 8 based on ASCVD/accord study       Seronegative rheumatoid arthritis (H) 01/24/2014     Priority: Medium     Fibromyalgia 10/20/2013     Priority: Medium     Spinal stenosis in cervical region 08/19/2013     Priority: Medium     Breast cancer screening 07/31/2013     Priority: Medium     Vitamin D deficiency 11/30/2012     Priority: Medium     Costochondritis      Priority: Medium     1/12       Uterine leiomyoma 06/08/2012     Priority: Medium     (Problem list name updated by automated process. Provider to review and confirm.)       Iron deficiency anemia 06/08/2012     Priority: Medium     Ischemic cardiomyopathy 02/02/2012     Priority: Medium     EF 35-40% 10/31/11 with acute NSTEMI.  Persistent mid septal and basal inferior hypokinesis with normal LV EF on f/u echo 1/20/12.       Hypertensive heart disease 02/02/2012     Priority: Medium     GERD (gastroesophageal reflux disease) 11/09/2011     Priority: Medium     NSTEMI (non-ST elevated myocardial infarction) (H) 11/01/2011     Priority: Medium     Jovan-septal MI       Hyperlipidemia LDL goal <70 11/01/2011     Priority: Medium     -Lipids 11/1/11:  , , , HDL 32       ASCVD (arteriosclerotic cardiovascular disease) 11/01/2011     Priority: Medium     PCI with stenting of LAD, D2, and prox RCA 11/2/11.   (Right dominant system with no left main dz.)       Common migraine 06/01/2011     Priority: Medium     (Problem list name updated by automated process. Provider to review and confirm.)       Hypertension  goal BP (blood pressure) < 140/90      Priority: Medium     Chronic pancreatitis (H)      Priority: Medium     10/08:  idiopathic, Dr. Hidalgo, Dr. Marcus, Dr. Preston Pastrana. Tx: PPI, antioxidants, pancreatic enzymes.    Has had an extensive workup of her chronic postprandial abdominal pain, bloating, and early satiety including gastric emptying study (3/09) and MRI/MRCP (3/09) in addition to EGD (10/08) and endoscopic ultrasound (11/08).  MRCP was suggestive of idiopathic chronic pancreatitis.   I do have records from Dr. Marcus at [facility].  She was referred to him by Dr. Hidalgo and Dr. Pastrana (though I don't have their records).  She was prescribed antioxidant vitamins and pancreatic enzymes as well as her PPI.  She states she had an allergic reaction to the enzymes (creon) which she attributes to the fact that there is pork in the capsules.  She is frustrated that no one has cured her of her pain.         PCOS (polycystic ovarian syndrome)      Priority: Medium     Moderate persistent asthma      Priority: Medium     Seasonal affective disorder (H)      Priority: Medium     SAD       Allergic rhinitis      Priority: Medium     Allergy, airborne subst        Past Medical History:   Diagnosis Date     Abnormal glandular Papanicolaou smear of cervix 1992     Allergic rhinitis     Allergy, airborne subst     Arthritis      ASCVD (arteriosclerotic cardiovascular disease)      Chronic pain      Chronic pancreatitis (H)     idiopathic, Tx: PPI, antioxidants, pancreatic enzymes     Common migraine      Coronary artery disease      Costochondritis      Costochondritis      Difficulty in walking(719.7)      Ectasia, mammary duct     [facility], persistent nipple discharge     Elevated fasting glucose      Gastro-oesophageal reflux disease      Hernia      Hyperlipidemia LDL goal < 100      Hypertension goal BP (blood pressure) < 140/90     Essential hypertension     Iron deficiency anemia      Ischemic  cardiomyopathy      Menorrhagia      Migraine headaches      Mild persistent asthma      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 Matias, neg w/u, Rx'd w/prednisone     Ventral hernia, unspecified, without mention of obstruction or gangrene 2012     Past Surgical History:   Procedure Laterality Date     C ECHO HEART XTHORACIC,COMPLETE, W/O DOPPLER  04    [facility] WNL, EF 60%     C/SECTION, LOW TRANSVERSE           CARDIAC SURGERY      cardiac stent- recent in 16; 4 other stents     DILATION AND CURETTAGE N/A 2016    Procedure: DILATION AND CURETTAGE;  Surgeon: Nahed Butler MD;  Location: [facility] OR     HC UGI ENDOSCOPY W EUS  08    Dr. Pastrana, possible chronic pancreatitis, fatty liver     HERNIA REPAIR  2012    done at [facility]     INSERT INTRAUTERINE DEVICE N/A 2016    Procedure: INSERT INTRAUTERINE DEVICE;  Surgeon: Nahed Butler MD;  Location: [facility]     INT UTERINE FIBRIOD EMBOLIZATION  10/29/2014     LAPAROSCOPIC CHOLECYSTECTOMY  08    Dr. Arnol GRUBBS TX, CERVICAL      s/p LEEP     ORBITOTOMY Right 3/15/2016    Procedure: ORBITOTOMY;  Surgeon: Myron Cyr MD;  Location: [facility]     UPPER GI ENDOSCOPY  10/21/08    mild gastritis, Dr. Hidalgo     Current Outpatient Prescriptions   Medication      COMPRESSION STOCKINGS     COMPRESSION STOCKINGS     fluconazole (DIFLUCAN) 200 MG tablet     hydrALAZINE (APRESOLINE) 25 MG tablet     insulin pen needle (BD MARCK U/F) 32G X 4 MM     insulin glargine (BASAGLAR KWIKPEN) 100 UNIT/ML injection     ranitidine (ZANTAC) 150 MG tablet     lidocaine (XYLOCAINE) 5 % ointment     traMADol (ULTRAM) 50 MG tablet     hydroxychloroquine (PLAQUENIL) 200 MG tablet     ferrous gluconate (FERGON) 324 (38 FE) MG tablet     ASPIRIN LOW DOSE 81 MG EC tablet     blood glucose monitoring (ONE TOUCH DELICA) lancets     vitamin D (ERGOCALCIFEROL) 69812 UNIT capsule     metoprolol (TOPROL-XL) 100 MG 24 hr tablet     clopidogrel (PLAVIX) 75 MG tablet     lisinopril-hydrochlorothiazide (PRINZIDE/ZESTORETIC) 20-25 MG per tablet     blood glucose monitoring (NO BRAND SPECIFIED) meter device kit     blood glucose monitoring (NO BRAND SPECIFIED) test strip     diphenhydrAMINE (BENADRYL) 25 MG capsule     EPIPEN 2-RIKY 0.3 MG/0.3ML injection     AEROSPAN 80 MCG/ACT AERS     fluticasone (FLONASE) 50 MCG/ACT spray     Magnesium Oxide -Mg Supplement 250 MG TABS     nystatin (MYCOSTATIN) 673163 UNITS TABS tablet     albuterol (2.5 MG/3ML) 0.083% neb solution     dicyclomine (BENTYL) 20 MG tablet     sucralfate (CARAFATE) 1 GM tablet     fluocinolone (SYNALAR) 0.01 % solution     mometasone (NASONEX) 50 MCG/ACT spray     cyclobenzaprine (FLEXERIL) 10 MG tablet     Ondansetron HCl (ZOFRAN PO)     pravastatin (PRAVACHOL) 40 MG tablet     spironolactone (ALDACTONE) 25 MG tablet     metFORMIN (GLUCOPHAGE-XR) 500 MG 24 hr tablet     montelukast (SINGULAIR) 10 MG tablet     acetaminophen (TYLENOL) 325 MG tablet     metroNIDAZOLE (NORITATE) 1 % cream     desonide (DESOWEN) 0.05 % cream     ketoconazole (NIZORAL) 2 % shampoo     fluocinonide (LIDEX) 0.05 % external solution     nitroglycerin (NITROSTAT) 0.4 MG SL tablet     albuterol (PROAIR HFA, PROVENTIL HFA, VENTOLIN HFA) 108 (90 BASE) MCG/ACT inhaler      "calcium carbonate (TUMS) 500 MG chewable tablet     fexofenadine (ALLEGRA) 180 MG tablet     olopatadine (PATANOL) 0.1 % ophthalmic solution     No current facility-administered medications for this visit.        OTC products: None, except as noted above    Allergies   Allergen Reactions     Amoxicillin-Pot Clavulanate      Augmentin      Unknown possible hives and edema     Azithromycin      Diatrizoate Other (See Comments)     Pt wants this listed because she is allergic to shellfish      Imitrex [Sumatriptan]      Severe face/neck/chest tightness and flushing side effects      Penicillins Hives     Unknown      Pork Allergy      Stomach pain, cramping, diarrhea, itching, nausea and headaches     Shellfish Allergy Hives and Swelling     Sulfa Drugs Hives and Swelling     Zithromax [Azithromycin Dihydrate] Swelling     Unknown       Latex Allergy: NO    Social History   Substance Use Topics     Smoking status: Former Smoker     Packs/day: 0.20     Years: 1.00     Types: Cigarettes     Quit date: 2/1/2016     Smokeless tobacco: Never Used     Alcohol use No     History   Drug Use No       REVIEW OF SYSTEMS:                                                    Constitutional, neuro, ENT, endocrine, pulmonary, cardiac, gastrointestinal, genitourinary, musculoskeletal, integument and psychiatric systems are negative, except as otherwise noted.      EXAM:                                                    /70 (BP Location: Right arm, Patient Position: Chair, Cuff Size: Adult Regular)  Pulse 80  Temp 97  F (36.1  C) (Tympanic)  Ht 5' 3\" (1.6 m)  Wt 165 lb (74.8 kg)  SpO2 98%  BMI 29.23 kg/m2    GENERAL APPEARANCE: healthy, alert and no distress     EYES: EOMI, PERRL     HENT:  nose and mouth without ulcers or lesions     NECK: no adenopathy     RESP: lungs clear to auscultation - no rales, rhonchi or wheezes     CV: regular rates and rhythm, normal S1 S2     ABDOMEN:  soft, + epigastric tender, no HSM or masses " and bowel sounds normal, no guarding, no ridigity      MS: extremities normal- no gross deformities noted, no evidence of inflammation in joints, FROM in all extremities.     SKIN: no suspicious lesions or rashes     NEURO: Normal strength and tone, sensory exam grossly normal, mentation intact and speech normal     PSYCH: mentation appears normal. and affect normal/bright     LYMPHATICS: No cervical nodes    DIAGNOSTICS:                                                      EKG: no acute ST/T changes c/w ischemia, unchanged from previous tracings  Labs Resulted Today:   Results for orders placed or performed in visit on 07/25/17   CBC with platelets   Result Value Ref Range    WBC 11.0 4.0 - 11.0 10e9/L    RBC Count 5.21 (H) 3.8 - 5.2 10e12/L    Hemoglobin 13.9 11.7 - 15.7 g/dL    Hematocrit 40.6 35.0 - 47.0 %    MCV 78 78 - 100 fl    MCH 26.7 26.5 - 33.0 pg    MCHC 34.2 31.5 - 36.5 g/dL    RDW 13.1 10.0 - 15.0 %    Platelet Count 334 150 - 450 10e9/L   Lipase   Result Value Ref Range    Lipase 228 73 - 393 U/L   Comprehensive metabolic panel (BMP + Alb, Alk Phos, ALT, AST, Total. Bili, TP)   Result Value Ref Range    Sodium 130 (L) 133 - 144 mmol/L    Potassium 4.0 3.4 - 5.3 mmol/L    Chloride 94 94 - 109 mmol/L    Carbon Dioxide 26 20 - 32 mmol/L    Anion Gap 10 3 - 14 mmol/L    Glucose 258 (H) 70 - 99 mg/dL    Urea Nitrogen 24 7 - 30 mg/dL    Creatinine 1.28 (H) 0.52 - 1.04 mg/dL    GFR Estimate 44 (L) >60 mL/min/1.7m2    GFR Estimate If Black 53 (L) >60 mL/min/1.7m2    Calcium 10.2 (H) 8.5 - 10.1 mg/dL    Bilirubin Total 0.5 0.2 - 1.3 mg/dL    Albumin 4.6 3.4 - 5.0 g/dL    Protein Total 8.3 6.8 - 8.8 g/dL    Alkaline Phosphatase 82 40 - 150 U/L    ALT 21 0 - 50 U/L    AST 11 0 - 45 U/L       Sodium 134  133 - 144 mmol/L Final 07/31/2017  3:46 PM 1740   Potassium 4.0  3.4 - 5.3 mmol/L Final 07/31/2017  3:46 PM 1740   Chloride 99  94 - 109 mmol/L Final 07/31/2017  3:46 PM 1740   Carbon Dioxide 26  20 - 32 mmol/L  Final 07/31/2017  3:46 PM 1740   Anion Gap 8  3 - 14 mmol/L Final 07/31/2017  3:46 PM 1740   Glucose 150 (H) 70 - 99 mg/dL Final 07/31/2017  3:46 PM 1740   Urea Nitrogen 28  7 - 30 mg/dL Final 07/31/2017  3:46 PM 1740   Creatinine 1.04  0.52 - 1.04 mg/dL Final 07/31/2017  3:46 PM 1740   GFR Estimate 56 (L) >60 mL/min/1.7m2 Final 07/31/2017  3:46 PM 1740   Comment:   Non  GFR Calc   GFR Estimate If Black 68  >60 mL/min/1.7m2 Final 07/31/2017  3:46 PM 1740   Comment:   African American GFR Calc   Calcium 9.6  8.5 - 10.1 mg/dL Final 07/31/2017  3:46 PM 1740         Echo 07/31/17  Interpretation Summary  Normal exercise echocardiogram without evidence of inducible ischemia.  Target heart rate was achieved. Heart rate and blood pressure response to  exercise were normal. With stress, the left ventricular ejection fraction  increased from 55-60% to greater than 65% and the left ventricular size  decreased appropriately.  Normal functional capacity. No subjective symptoms to suggest ischemia.  There was no ECG evidence of ischemia.  No significant valve disease on screening doppler evaluation. The aortic root  and visualized ascending aorta are normal.  _____________________________________________________________________________    IMPRESSION:                                                    Reason for surgery/procedure: eye mass   Diagnosis/reason for consult: pre-op    The proposed surgical procedure is considered INTERMEDIATE risk.    REVISED CARDIAC RISK INDEX  The patient has the following serious cardiovascular risks for perioperative complications such as (MI, PE, VFib and 3  AV Block):  Coronary Artery Disease (MI, positive stress test, angina, Qs on EKG)  Diabetes Mellitus (on Insulin)  INTERPRETATION: 2 risks: Class III (moderate risk - 6.6% complication rate)    The patient has the following additional risks for perioperative complications:  No identified additional risks      ICD-10-CM    1.  Preop general physical exam Z01.818        RECOMMENDATIONS:                                                      --Consult hospital rounder / IM to assist post-op medical management    Cardiovascular Risk  Patient is already on a Beta Blocker. Continue Betablocker therapy after surgery, using Beta blocker order set as necessary for NPO status.    --Patient is to take all scheduled medications on the day of surgery EXCEPT for modifications listed below.    Diabetes Medication Use  -----Hold usual  oral diabetic meds (e.g. Metformin, Actos, Glipizide) while NPO.   -----Take 80% of long acting insulin (e.g. Lantus, NPH) while NPO (fasting)  -----Use usual sliding scale correction of insulin while NPO (fasting)        APPROVAL GIVEN to proceed with proposed procedure, without further diagnostic evaluation       ## Seasonal affective disorder  - stable     ## NSTEMI (non-ST elevated myocardial infarction), CAD S/P percutaneous coronary angioplasty  - Normal exercise echocardiogram without evidence of inducible ischemia.  - Per pt cardiologist recommended holding asprin and plavix for one wek     ## Type 2 diabetes mellitus with other specified complication  - Lantus 32 U in the morning. Hold Metformin.     ## Seronegative rheumatoid arthritis   - continue plaquenil     ## Chronic pancreatitis, unspecified pancreatitis type   - Lipase WNL    ## Moderate persistent asthma without complication  - stable     ## Hypertension goal BP (blood pressure) < 140/90  - stable, pt takes antihypertensives at night     ## Hyperlipidemia LDL goal <70  - stable, pt takes statin at night     ## Gastroesophageal reflux disease, esophagitis presence not specified  - stable     ## Hyponatremia  - Basic metabolic panel; Future  - repeat BMP normal       Signed Electronically by: Gabriel Greer MD    Copy of this evaluation report is provided to requesting physician.    [facility] Preop Guidelines

## 2017-08-04 ENCOUNTER — ANESTHESIA EVENT (OUTPATIENT)
Dept: SURGERY | Facility: CLINIC | Age: 52
End: 2017-08-04
Payer: COMMERCIAL

## 2017-08-04 ENCOUNTER — ANESTHESIA (OUTPATIENT)
Dept: SURGERY | Facility: CLINIC | Age: 52
End: 2017-08-04
Payer: COMMERCIAL

## 2017-08-04 ENCOUNTER — HOSPITAL ENCOUNTER (OUTPATIENT)
Facility: CLINIC | Age: 52
Discharge: HOME | End: 2017-08-04
Attending: OPHTHALMOLOGY | Admitting: OPHTHALMOLOGY
Payer: COMMERCIAL

## 2017-08-04 VITALS
WEIGHT: 162.3 LBS | OXYGEN SATURATION: 96 % | HEART RATE: 73 BPM | TEMPERATURE: 95.2 F | BODY MASS INDEX: 28.76 KG/M2 | DIASTOLIC BLOOD PRESSURE: 70 MMHG | SYSTOLIC BLOOD PRESSURE: 101 MMHG | HEIGHT: 63 IN | RESPIRATION RATE: 12 BRPM

## 2017-08-04 DIAGNOSIS — Z98.890 POSTOPERATIVE EYE STATE: Primary | ICD-10-CM

## 2017-08-04 LAB
GLUCOSE BLDC GLUCOMTR-MCNC: 239 MG/DL (ref 70–99)
GLUCOSE BLDC GLUCOMTR-MCNC: 282 MG/DL (ref 70–99)
GLUCOSE BLDC GLUCOMTR-MCNC: 314 MG/DL (ref 70–99)
HCG SERPL QL: NEGATIVE

## 2017-08-04 PROCEDURE — 84703 CHORIONIC GONADOTROPIN ASSAY: CPT | Performed by: ANESTHESIOLOGY

## 2017-08-04 PROCEDURE — 40001004 ZZHCL STATISTIC FLOW INT 9-15 ABY TC 88188: Performed by: OPHTHALMOLOGY

## 2017-08-04 PROCEDURE — 71000027 ZZH RECOVERY PHASE 2 EACH 15 MINS: Performed by: OPHTHALMOLOGY

## 2017-08-04 PROCEDURE — 88305 TISSUE EXAM BY PATHOLOGIST: CPT | Performed by: OPHTHALMOLOGY

## 2017-08-04 PROCEDURE — 88184 FLOWCYTOMETRY/ TC 1 MARKER: CPT | Performed by: OPHTHALMOLOGY

## 2017-08-04 PROCEDURE — 37000008 ZZH ANESTHESIA TECHNICAL FEE, 1ST 30 MIN: Performed by: OPHTHALMOLOGY

## 2017-08-04 PROCEDURE — 40000170 ZZH STATISTIC PRE-PROCEDURE ASSESSMENT II: Performed by: OPHTHALMOLOGY

## 2017-08-04 PROCEDURE — 25000128 H RX IP 250 OP 636: Performed by: OPHTHALMOLOGY

## 2017-08-04 PROCEDURE — 71000012 ZZH RECOVERY PHASE 1 LEVEL 1 FIRST HR: Performed by: OPHTHALMOLOGY

## 2017-08-04 PROCEDURE — 88185 FLOWCYTOMETRY/TC ADD-ON: CPT | Performed by: OPHTHALMOLOGY

## 2017-08-04 PROCEDURE — 25000128 H RX IP 250 OP 636: Performed by: ANESTHESIOLOGY

## 2017-08-04 PROCEDURE — 37000009 ZZH ANESTHESIA TECHNICAL FEE, EACH ADDTL 15 MIN: Performed by: OPHTHALMOLOGY

## 2017-08-04 PROCEDURE — 88341 IMHCHEM/IMCYTCHM EA ADD ANTB: CPT | Performed by: OPHTHALMOLOGY

## 2017-08-04 PROCEDURE — 25000125 ZZHC RX 250: Performed by: NURSE ANESTHETIST, CERTIFIED REGISTERED

## 2017-08-04 PROCEDURE — 88342 IMHCHEM/IMCYTCHM 1ST ANTB: CPT | Performed by: OPHTHALMOLOGY

## 2017-08-04 PROCEDURE — 88342 IMHCHEM/IMCYTCHM 1ST ANTB: CPT | Mod: 26 | Performed by: OPHTHALMOLOGY

## 2017-08-04 PROCEDURE — 36000058 ZZH SURGERY LEVEL 3 EA 15 ADDTL MIN: Performed by: OPHTHALMOLOGY

## 2017-08-04 PROCEDURE — 27210794 ZZH OR GENERAL SUPPLY STERILE: Performed by: OPHTHALMOLOGY

## 2017-08-04 PROCEDURE — S0020 INJECTION, BUPIVICAINE HYDRO: HCPCS | Performed by: OPHTHALMOLOGY

## 2017-08-04 PROCEDURE — 25000566 ZZH SEVOFLURANE, EA 15 MIN: Performed by: OPHTHALMOLOGY

## 2017-08-04 PROCEDURE — 71000013 ZZH RECOVERY PHASE 1 LEVEL 1 EA ADDTL HR: Performed by: OPHTHALMOLOGY

## 2017-08-04 PROCEDURE — S0077 INJECTION, CLINDAMYCIN PHOSP: HCPCS | Performed by: OPHTHALMOLOGY

## 2017-08-04 PROCEDURE — 82962 GLUCOSE BLOOD TEST: CPT | Mod: 91

## 2017-08-04 PROCEDURE — 88341 IMHCHEM/IMCYTCHM EA ADD ANTB: CPT | Mod: 26 | Performed by: OPHTHALMOLOGY

## 2017-08-04 PROCEDURE — 36000056 ZZH SURGERY LEVEL 3 1ST 30 MIN: Performed by: OPHTHALMOLOGY

## 2017-08-04 PROCEDURE — 36415 COLL VENOUS BLD VENIPUNCTURE: CPT | Performed by: ANESTHESIOLOGY

## 2017-08-04 PROCEDURE — 25000125 ZZHC RX 250: Performed by: OPHTHALMOLOGY

## 2017-08-04 PROCEDURE — 25000132 ZZH RX MED GY IP 250 OP 250 PS 637: Performed by: OPHTHALMOLOGY

## 2017-08-04 PROCEDURE — 88305 TISSUE EXAM BY PATHOLOGIST: CPT | Mod: 26 | Performed by: OPHTHALMOLOGY

## 2017-08-04 PROCEDURE — 88161 CYTOPATH SMEAR OTHER SOURCE: CPT | Performed by: OPHTHALMOLOGY

## 2017-08-04 PROCEDURE — 88161 CYTOPATH SMEAR OTHER SOURCE: CPT | Mod: 26 | Performed by: OPHTHALMOLOGY

## 2017-08-04 PROCEDURE — 25000128 H RX IP 250 OP 636: Performed by: NURSE ANESTHETIST, CERTIFIED REGISTERED

## 2017-08-04 RX ORDER — ONDANSETRON 2 MG/ML
4 INJECTION INTRAMUSCULAR; INTRAVENOUS EVERY 30 MIN PRN
Status: DISCONTINUED | OUTPATIENT
Start: 2017-08-04 | End: 2017-08-04 | Stop reason: HOSPADM

## 2017-08-04 RX ORDER — SODIUM CHLORIDE, SODIUM LACTATE, POTASSIUM CHLORIDE, CALCIUM CHLORIDE 600; 310; 30; 20 MG/100ML; MG/100ML; MG/100ML; MG/100ML
INJECTION, SOLUTION INTRAVENOUS CONTINUOUS
Status: DISCONTINUED | OUTPATIENT
Start: 2017-08-04 | End: 2017-08-04 | Stop reason: HOSPADM

## 2017-08-04 RX ORDER — PROPOFOL 10 MG/ML
INJECTION, EMULSION INTRAVENOUS PRN
Status: DISCONTINUED | OUTPATIENT
Start: 2017-08-04 | End: 2017-08-04

## 2017-08-04 RX ORDER — TETRACAINE HYDROCHLORIDE 5 MG/ML
SOLUTION OPHTHALMIC PRN
Status: DISCONTINUED | OUTPATIENT
Start: 2017-08-04 | End: 2017-08-04 | Stop reason: HOSPADM

## 2017-08-04 RX ORDER — BACITRACIN 500 [USP'U]/G
OINTMENT OPHTHALMIC
Qty: 3.5 G | Refills: 0 | Status: SHIPPED | OUTPATIENT
Start: 2017-08-04 | End: 2019-01-11

## 2017-08-04 RX ORDER — PROPOFOL 10 MG/ML
INJECTION, EMULSION INTRAVENOUS CONTINUOUS PRN
Status: DISCONTINUED | OUTPATIENT
Start: 2017-08-04 | End: 2017-08-04

## 2017-08-04 RX ORDER — SODIUM CHLORIDE, SODIUM LACTATE, POTASSIUM CHLORIDE, CALCIUM CHLORIDE 600; 310; 30; 20 MG/100ML; MG/100ML; MG/100ML; MG/100ML
INJECTION, SOLUTION INTRAVENOUS CONTINUOUS PRN
Status: DISCONTINUED | OUTPATIENT
Start: 2017-08-04 | End: 2017-08-04

## 2017-08-04 RX ORDER — ONDANSETRON 4 MG/1
4 TABLET, ORALLY DISINTEGRATING ORAL EVERY 30 MIN PRN
Status: DISCONTINUED | OUTPATIENT
Start: 2017-08-04 | End: 2017-08-04 | Stop reason: HOSPADM

## 2017-08-04 RX ORDER — TRIAMCINOLONE ACETONIDE 40 MG/ML
INJECTION, SUSPENSION INTRA-ARTICULAR; INTRAMUSCULAR PRN
Status: DISCONTINUED | OUTPATIENT
Start: 2017-08-04 | End: 2017-08-04 | Stop reason: HOSPADM

## 2017-08-04 RX ORDER — FENTANYL CITRATE 50 UG/ML
25-50 INJECTION, SOLUTION INTRAMUSCULAR; INTRAVENOUS
Status: DISCONTINUED | OUTPATIENT
Start: 2017-08-04 | End: 2017-08-04 | Stop reason: HOSPADM

## 2017-08-04 RX ORDER — HYDROMORPHONE HYDROCHLORIDE 1 MG/ML
.3-.5 INJECTION, SOLUTION INTRAMUSCULAR; INTRAVENOUS; SUBCUTANEOUS EVERY 10 MIN PRN
Status: DISCONTINUED | OUTPATIENT
Start: 2017-08-04 | End: 2017-08-04 | Stop reason: HOSPADM

## 2017-08-04 RX ORDER — NALOXONE HYDROCHLORIDE 0.4 MG/ML
.1-.4 INJECTION, SOLUTION INTRAMUSCULAR; INTRAVENOUS; SUBCUTANEOUS
Status: DISCONTINUED | OUTPATIENT
Start: 2017-08-04 | End: 2017-08-04 | Stop reason: HOSPADM

## 2017-08-04 RX ORDER — BACITRACIN 500 [USP'U]/G
OINTMENT OPHTHALMIC
Status: DISCONTINUED
Start: 2017-08-04 | End: 2017-08-04 | Stop reason: HOSPADM

## 2017-08-04 RX ORDER — FENTANYL CITRATE 50 UG/ML
INJECTION, SOLUTION INTRAMUSCULAR; INTRAVENOUS PRN
Status: DISCONTINUED | OUTPATIENT
Start: 2017-08-04 | End: 2017-08-04

## 2017-08-04 RX ORDER — HYDROCODONE BITARTRATE AND ACETAMINOPHEN 5; 325 MG/1; MG/1
1 TABLET ORAL EVERY 6 HOURS PRN
Qty: 10 TABLET | Refills: 0 | Status: ON HOLD | OUTPATIENT
Start: 2017-08-04 | End: 2017-11-17

## 2017-08-04 RX ORDER — CLINDAMYCIN PHOSPHATE 600 MG/50ML
600 INJECTION, SOLUTION INTRAVENOUS ONCE
Status: COMPLETED | OUTPATIENT
Start: 2017-08-04 | End: 2017-08-04

## 2017-08-04 RX ORDER — BACITRACIN 500 [USP'U]/G
OINTMENT OPHTHALMIC PRN
Status: DISCONTINUED | OUTPATIENT
Start: 2017-08-04 | End: 2017-08-04 | Stop reason: HOSPADM

## 2017-08-04 RX ORDER — HYDROCODONE BITARTRATE AND ACETAMINOPHEN 5; 325 MG/1; MG/1
1 TABLET ORAL ONCE
Status: COMPLETED | OUTPATIENT
Start: 2017-08-04 | End: 2017-08-04

## 2017-08-04 RX ORDER — TRIAMCINOLONE ACETONIDE 40 MG/ML
INJECTION, SUSPENSION INTRA-ARTICULAR; INTRAMUSCULAR
Status: DISCONTINUED
Start: 2017-08-04 | End: 2017-08-04 | Stop reason: HOSPADM

## 2017-08-04 RX ORDER — EPHEDRINE SULFATE 50 MG/ML
INJECTION, SOLUTION INTRAMUSCULAR; INTRAVENOUS; SUBCUTANEOUS PRN
Status: DISCONTINUED | OUTPATIENT
Start: 2017-08-04 | End: 2017-08-04

## 2017-08-04 RX ORDER — ONDANSETRON 2 MG/ML
INJECTION INTRAMUSCULAR; INTRAVENOUS PRN
Status: DISCONTINUED | OUTPATIENT
Start: 2017-08-04 | End: 2017-08-04

## 2017-08-04 RX ADMIN — PROPOFOL 75 MCG/KG/MIN: 10 INJECTION, EMULSION INTRAVENOUS at 11:28

## 2017-08-04 RX ADMIN — HYDROCODONE BITARTRATE AND ACETAMINOPHEN 1 TABLET: 5; 325 TABLET ORAL at 13:34

## 2017-08-04 RX ADMIN — SUCCINYLCHOLINE CHLORIDE 100 MG: 20 INJECTION, SOLUTION INTRAMUSCULAR; INTRAVENOUS at 11:20

## 2017-08-04 RX ADMIN — FENTANYL CITRATE 50 MCG: 50 INJECTION, SOLUTION INTRAMUSCULAR; INTRAVENOUS at 12:44

## 2017-08-04 RX ADMIN — FENTANYL CITRATE 50 MCG: 50 INJECTION, SOLUTION INTRAMUSCULAR; INTRAVENOUS at 11:26

## 2017-08-04 RX ADMIN — HYDROCODONE BITARTRATE AND ACETAMINOPHEN 1 TABLET: 5; 325 TABLET ORAL at 14:14

## 2017-08-04 RX ADMIN — CLINDAMYCIN PHOSPHATE 600 MG: 12 INJECTION, SOLUTION INTRAVENOUS at 11:24

## 2017-08-04 RX ADMIN — FENTANYL CITRATE 50 MCG: 50 INJECTION, SOLUTION INTRAMUSCULAR; INTRAVENOUS at 12:37

## 2017-08-04 RX ADMIN — ONDANSETRON 4 MG: 2 INJECTION INTRAMUSCULAR; INTRAVENOUS at 11:44

## 2017-08-04 RX ADMIN — PHENYLEPHRINE HYDROCHLORIDE 100 MCG: 10 INJECTION, SOLUTION INTRAMUSCULAR; INTRAVENOUS; SUBCUTANEOUS at 11:35

## 2017-08-04 RX ADMIN — PHENYLEPHRINE HYDROCHLORIDE 100 MCG: 10 INJECTION, SOLUTION INTRAMUSCULAR; INTRAVENOUS; SUBCUTANEOUS at 11:41

## 2017-08-04 RX ADMIN — SODIUM CHLORIDE, POTASSIUM CHLORIDE, SODIUM LACTATE AND CALCIUM CHLORIDE: 600; 310; 30; 20 INJECTION, SOLUTION INTRAVENOUS at 11:20

## 2017-08-04 RX ADMIN — ONDANSETRON 4 MG: 2 SOLUTION INTRAMUSCULAR; INTRAVENOUS at 13:41

## 2017-08-04 RX ADMIN — PHENYLEPHRINE HYDROCHLORIDE 100 MCG: 10 INJECTION, SOLUTION INTRAMUSCULAR; INTRAVENOUS; SUBCUTANEOUS at 11:31

## 2017-08-04 RX ADMIN — DEXMEDETOMIDINE HYDROCHLORIDE 8 MCG: 100 INJECTION, SOLUTION INTRAVENOUS at 11:52

## 2017-08-04 RX ADMIN — MIDAZOLAM HYDROCHLORIDE 2 MG: 1 INJECTION, SOLUTION INTRAMUSCULAR; INTRAVENOUS at 11:20

## 2017-08-04 RX ADMIN — PHENYLEPHRINE HYDROCHLORIDE 100 MCG: 10 INJECTION, SOLUTION INTRAMUSCULAR; INTRAVENOUS; SUBCUTANEOUS at 11:57

## 2017-08-04 RX ADMIN — PROPOFOL 200 MG: 10 INJECTION, EMULSION INTRAVENOUS at 11:20

## 2017-08-04 RX ADMIN — PHENYLEPHRINE HYDROCHLORIDE 100 MCG: 10 INJECTION, SOLUTION INTRAMUSCULAR; INTRAVENOUS; SUBCUTANEOUS at 11:32

## 2017-08-04 RX ADMIN — DEXMEDETOMIDINE HYDROCHLORIDE 8 MCG: 100 INJECTION, SOLUTION INTRAVENOUS at 11:36

## 2017-08-04 RX ADMIN — Medication 7.5 MG: at 11:41

## 2017-08-04 RX ADMIN — FENTANYL CITRATE 50 MCG: 50 INJECTION, SOLUTION INTRAMUSCULAR; INTRAVENOUS at 11:20

## 2017-08-04 RX ADMIN — FENTANYL CITRATE 50 MCG: 50 INJECTION, SOLUTION INTRAMUSCULAR; INTRAVENOUS at 11:52

## 2017-08-04 RX ADMIN — Medication 7.5 MG: at 11:37

## 2017-08-04 RX ADMIN — FENTANYL CITRATE 50 MCG: 50 INJECTION, SOLUTION INTRAMUSCULAR; INTRAVENOUS at 14:20

## 2017-08-04 RX ADMIN — PHENYLEPHRINE HYDROCHLORIDE 100 MCG: 10 INJECTION, SOLUTION INTRAMUSCULAR; INTRAVENOUS; SUBCUTANEOUS at 11:37

## 2017-08-04 ASSESSMENT — LIFESTYLE VARIABLES: TOBACCO_USE: 1

## 2017-08-04 ASSESSMENT — COPD QUESTIONNAIRES: COPD: 1

## 2017-08-04 NOTE — OR NURSING
Pt able to nap, appears comfortable and ready for discharge.  Sats 97% on room air at discharge time.

## 2017-08-04 NOTE — DISCHARGE INSTRUCTIONS
Same Day Surgery Discharge Instructions for  Sedation and General Anesthesia       It's not unusual to feel dizzy, light-headed or faint for up to 24 hours after surgery or while taking pain medication.  If you have these symptoms: sit for a few minutes before standing and have someone assist you when you get up to walk or use the bathroom.      You should rest and relax for the next 24 hours. We recommend you make arrangements to have an adult stay with you for at least 24 hours after your discharge.  Avoid hazardous and strenuous activity.      DO NOT DRIVE any vehicle or operate mechanical equipment for 24 hours following the end of your surgery.  Even though you may feel normal, your reactions may be affected by the medication you have received.      Do not drink alcoholic beverages for 24 hours following surgery.       Slowly progress to your regular diet as you feel able. It's not unusual to feel nauseated and/or vomit after receiving anesthesia.  If you develop these symptoms, drink clear liquids (apple juice, ginger ale, broth, 7-up, etc. ) until you feel better.  If your nausea and vomiting persists for 24 hours, please notify your surgeon.        All narcotic pain medications, along with inactivity and anesthesia, can cause constipation. Drinking plenty of liquids and increasing fiber intake will help.      For any questions of a medical nature, call your surgeon.      Do not make important decisions for 24 hours.      If you had general anesthesia, you may have a sore throat for a couple of days related to the breathing tube used during surgery.  You may use Cepacol lozenges to help with this discomfort.  If it worsens or if you develop a fever, contact your surgeon.       If you feel your pain is not well managed with the pain medications prescribed by your surgeon, please contact your surgeon's office to let them know so they can address your concerns.     Post-operative Instructions    Ophthalmic  Plastic and Reconstructive Surgery  Mike Thompson M.D.  Charis Holbrook M.D.  Yesi Valdez M.D.    All instructions apply to the operated eye(s) or eyelid(s)      What to expect after surgery:    Thre will be some swelling, bruising, and likely a black eye (even into the lower eyelids and cheeks). Also expect crusting and discharge from the eye and/or incisions.     A small amount of surface bleeding is normal for the first 48 hours after surgery.    You may notice some bloody tears for the first few days after surgery. This is normal.    Your eye(s) and eyelid(s) may be painful and tender. This is normal after surgery. Use the pain medication as prescribed. If your pain does not improve despite the medication, contact the office.    Wound care and personal care:    If a patch or bandage has been placed, please leave this in place until seen in clinic. Prevent the bandage from getting wet.     Apply ice compresses 15 minutes on 15 minutes off while awake for the first 2 days after surgery, then switch to warm compresses 4 times a day until seen by your physician.     For warm packs you can place a cup of dry uncooked rice in a clean cotton sock. Place sock in microwave 30 seconds to one minute. Next place the warm sock into a plastic bag and wrap the bag with clean warm wet washcloth and place over operated eye.      You may shower or wash your hair the day after surgery. Do not bathe or go swimming for 1 week to prevent contamination of your wounds.    Do not apply make-up to the eyes or eyelids for 2 weeks after surgery.      Activity restrictions and driving:    Avoid heavy lifting, bending, exercise or strenuous activity for 1 week after surgery.    You may resume other activities and return to work as tolerated.    You may not resume driving until have you stopped using narcotic pain medications(such as Norco, Percocet, Tylenol #3).    Medications:    Restart all your regular home medications and eye  drops today. If you take Plavix or Aspirin on a regular basis, wait for 3 days after your surgery before restarting these in order to decrease the risk of bleeding complications.    Avoid aspirin and aspirin-like medications (Motrin, Aleve, Ibuprofen, [place] etc) for 5 days to reduce the risk of bleeding. You may take Tylenol (acetaminophen) for pain.    In addition to your home medications, take the following post-operative medications as prescribed by your physician:    Apply antibiotic ointment (erythromycin) to all sutures three times a day, and into the operated eye(s) at night.    Take 1 to 2 pain pills (norco or tylenol 3 as prescribed) as needed for pain up to every 4 hours.    The pain pills may make you drowsy. You must not drive a car, operate heavy machinery or drink alcohol while taking them.    The pain pills may cause constipation and nausea. Take them with some food to prevent a stomach upset. If you continue to experience nausea, call your physician.      WARNING: All the prescription pain medications listed above contain Tylenol (acetaminophen). You must not take more than 4,000 mg of acetaminophen per 24-hour period. This is equivalent to 6 tablets of Darvocet, 8 tablets of Vicodin, or 12 tablets of Norco, Percocet or Tylenol #3. If you take other over-the-counter medications containing acetaminophen, you must take the amount of acetaminophen into account and reduce the number of prescribed pain pills accordingly.    Contact information and follow-up:    Return to the [facility] for a follow-up appointment with your physician as  scheduled. If no appointment has been scheduled, call [phone] for an  appointment with Dr. Thompson within 1 to 2 weeks from your date of surgery.      For severe pain, bleeding, or loss of vision, call the Eye Clinic at [phone].    After hours or on weekends and holidays, call [phone] and ask to speak with the ophthalmologist on call.    While you  were at the hospital today you were given 650 mg of Tylenol. The recommended daily maximum dose is 4000 mg.

## 2017-08-04 NOTE — OR NURSING
"Upset, seems irritated with preop admission questions. Explained reason for these. Vague responses. Appears lethargic. Responses are slow.  Complains of upper abdominal pain and nausea for  \" a few days\",  Has had this before.  States vomited small amount of bile this AM.  Abdominal pain is at a \"5\" on pain scale and pain wraps around abdomen into back.  Anti anxiety and anti nausea aroma therapy applied to gown  Dr. Mayfield aware of nausea and pain today.  .    "

## 2017-08-04 NOTE — ANESTHESIA CARE TRANSFER NOTE
Patient: Janine Cornell    Procedure(s):  RIGHT ORBITOTOMY AND BIOPSY - Wound Class: I-Clean    Diagnosis: ORBITAL INFLAMMATION  Diagnosis Additional Information: No value filed.    Anesthesia Type:   General     Note:  Airway :Face Mask and Oral Airway  Patient transferred to:PACU  Comments: Spontaneous respirations, tidal volume > 400, oxygen saturation > 97%, TOF 4/4 with > 5 seconds sustained tetany, follows commands.  Suctioned and extubated with cuff down to facemask.  Exchanging well.Transferred to PACU, spontaneous respirations, 10L oxygen via facemask.  All monitors and alarms on and functioning, VSS.  Patient awake, comfortable.  Report to PACU RN.      Vitals: (Last set prior to Anesthesia Care Transfer)    CRNA VITALS  8/4/2017 1148 - 8/4/2017 1228      8/4/2017             NIBP: 91/53    NIBP Mean: 63                Electronically Signed By: KAYLYNN Francisco CRNA  August 4, 2017  12:28 PM

## 2017-08-04 NOTE — OP NOTE
PREOPERATIVE DIAGNOSIS:  Right superior lateral orbital mass.      POSTOPERATIVE DIAGNOSIS:  Right superior lateral orbital mass.      PROCEDURE:  Right anterior orbitotomy with biopsy.  Right intraorbital injection of kenalog.    SURGEON: Charis Holbrook MD     ASSISTANT:  Yesi Valdez MD, Rk Valencia MD     ANESTHESIA:  General with local infiltration of a 50/50 mixture of 2% lidocaine with epinephrine and 0.5% Marcaine.      COMPLICATIONS:  None.      ESTIMATED BLOOD LOSS:  Less than 5 mL.     SPECIMENS:  Right  orbital mass in saline for fresh tissue evaluation and lymphoma workup as well as gross/permanent.      HISTORY:  Janine Cornell  presented with a mass in the right superior temporal orbit in the region of the lacrimal fossa.  After the risks, benefits and alternatives to the proposed procedure were explained, informed consent was obtained. We discussed in detail the goals of the surgery as she was unhappy with her prior surgery elsewhere. I explained I would use the same scar, I am not addressing her upper eyelid position.      DESCRIPTION OF PROCEDURE:  Janine Cornell  was brought to the operating room and placed supine on the operating table.  General anesthesia was induced.  The right upper lid crease was marked with a marking pen and infiltrated with local anesthetic.  The area was prepped and draped in the typical sterile ophthalmic fashion.  Attention was directed to the right  side.  Lid crease incision was made with a 15 blade and dissection carried down to the orbicularis with high temperature cautery.  Orbital septum was opened horizontally.  In the region of the lacrimal fossa there was significant scar tissue from prior procedures. Some remnant of the lacrimal gland was excised. Deep to this in the lacrimal fossa there was a separate mass, it was white and firm. I dissected as much of the mass out as possible with a 15 blade. It was firmly adherent to the surrounding bone. Hemostasis  was obtained with monopolar cautery. This was also placed in saline. Hemostasis was obtained with monopolar cautery.  Then 0.5 mL of kenalog 40 was infiltrated into the area, which was the superolateral orbit through a cannula. Once meticulous hemostasis was obtained, the skin was closed with running 6-0 plain gut suture.  Erythromycin ointment was applied to the incision.  Janine Cornell  tolerated the procedure well.  Janine Cornell  left the operating room in stable condition.              Charis Holbrook MD

## 2017-08-04 NOTE — IP AVS SNAPSHOT
[facility]    [address]    [place] [address]    Phone:  [phone]    Fax:  [phone]                                       After Visit Summary   8/4/2017    Janine Cornell    MRN: [id number]           After Visit Summary Signature Page     I have received my discharge instructions, and my questions have been answered. I have discussed any challenges I see with this plan with the nurse or doctor.    ..........................................................................................................................................  Patient/Patient Representative Signature      ..........................................................................................................................................  Patient Representative Print Name and Relationship to Patient    ..................................................               ................................................  Date                                            Time    ..........................................................................................................................................  Reviewed by Signature/Title    ...................................................              ..............................................  Date                                                            Time

## 2017-08-04 NOTE — PROGRESS NOTES
Pt crying and  rating pain a 10 now.   Pt given second dose of Norco per written orders from anesthesia and written instructions on Dr. Holbrook's discharge instructions.   Supplemented with 50 mcg of Fentanyl at this time to bridge pain management with oral agent.  VSS.  Mom here now.  Pt drinking gingerale.  Denies nausea at the moment.  Observing for 02 sats while getting fentanyl.  Will reassess pain and discharge goals.

## 2017-08-04 NOTE — OR NURSING
While getting dressed, patient started to cough. When asked if she was coughing, she stated she was nauseated. Zofran given. Patient has had no emesis with this. Dr. Barriga to bedside to assess patient. Is not concerned at this time, states to let patient rest a few minutes, then try to get her up again.

## 2017-08-04 NOTE — ANESTHESIA PREPROCEDURE EVALUATION
Anesthesia Evaluation     . Pt has had prior anesthetic.     History of anesthetic complications   - PONV        ROS/MED HX    ENT/Pulmonary:     (+)sleep apnea (bipap), tobacco use, Past use asthma COPD, , . .    Neurologic:     (+)CVA TIA     Cardiovascular: Comment: Hx of ischemic cardiomyopathy, resolved    Recent echo 7/31, normal EF    (+) Dyslipidemia, hypertension--CAD, -past MI (2011),-. : . . ROBERTS, . :. .       METS/Exercise Tolerance:     Hematologic:         Musculoskeletal: Comment: Rheumatoid arthritis        GI/Hepatic:     (+) GERD       Renal/Genitourinary:         Endo:     (+) type II DM Using insulin Obesity, .      Psychiatric:     (+) psychiatric history depression      Infectious Disease:         Malignancy:         Other:                     Physical Exam  Normal systems: dental    Airway   Mallampati: I  TM distance: >3 FB  Neck ROM: full    Dental     Cardiovascular   Rhythm and rate: regular      Pulmonary    breath sounds clear to auscultation                    Anesthesia Plan      History & Physical Review  History and physical reviewed and following examination; no interval change.    ASA Status:  4 .        Plan for General, RSI and ETT with Intravenous induction. Maintenance will be Balanced.    PONV prophylaxis:  Ondansetron (or other 5HT-3) and Dexamethasone or Solumedrol  Additional equipment: Videolaryngoscope      Postoperative Care  Postoperative pain management:  IV analgesics and Oral pain medications.      Consents  Anesthetic plan, risks, benefits and alternatives discussed with:  Patient..                          .

## 2017-08-07 LAB — COPATH REPORT: NORMAL

## 2017-08-09 LAB — COPATH REPORT: NORMAL

## 2017-08-15 ENCOUNTER — OFFICE VISIT (OUTPATIENT)
Dept: OPHTHALMOLOGY | Facility: CLINIC | Age: 52
End: 2017-08-15

## 2017-08-15 DIAGNOSIS — I77.82 ANCA-ASSOCIATED VASCULITIS (H): ICD-10-CM

## 2017-08-15 DIAGNOSIS — H05.10 IDIOPATHIC ORBITAL INFLAMMATORY SYNDROME: ICD-10-CM

## 2017-08-15 DIAGNOSIS — H04.001 LACRIMAL GLAND INFLAMMATION, RIGHT: Primary | ICD-10-CM

## 2017-08-15 ASSESSMENT — CUP TO DISC RATIO
OD_RATIO: 0.2
OS_RATIO: 0.2

## 2017-08-15 ASSESSMENT — REFRACTION_WEARINGRX
OS_AXIS: 010
OD_AXIS: 167
OD_SPHERE: -4.00
OS_SPHERE: -4.00
OS_SPHERE: -3.75
OD_CYLINDER: +1.00
OD_SPHERE: -3.75
OD_AXIS: 175
OS_AXIS: 008
OD_CYLINDER: +1.00
OS_CYLINDER: +1.00
OS_CYLINDER: +1.00

## 2017-08-15 ASSESSMENT — VISUAL ACUITY
OS_CC: 20/20
OD_CC: 20/30
OD_CC+: -2
CORRECTION_TYPE: GLASSES
METHOD: SNELLEN - LINEAR

## 2017-08-15 ASSESSMENT — SLIT LAMP EXAM - LIDS: COMMENTS: NORMAL

## 2017-08-15 ASSESSMENT — TONOMETRY: IOP_UNABLETOASSESS: 1

## 2017-08-15 ASSESSMENT — EXTERNAL EXAM - LEFT EYE: OS_EXAM: NORMAL

## 2017-08-15 NOTE — MR AVS SNAPSHOT
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        ANCA-associated vasculitis (H)           Follow-ups after your visit        Your next 10 appointments already scheduled     Sep 26, 2017  2:15 PM CDT   (Arrive by 2:00 PM)   RETURN PLASTICS with Charis Holbrook MD    [facility] ([facility])    [address] [place] [address]  4th Floor  [facility] [address] [phone]            Dec 20, 2017  3:30 PM CST   Lab with  LAB   [facility] ([facility])    [address] [place] [address]  1st Floor  [facility] [id number] [phone]            Dec 20, 2017  4:00 PM CST   (Arrive by 3:45 PM)   Return Visit with Milan Peace MD   [facility] ([facility])    [address] [place] [address]  3rd Floor  [facility] [id number] [phone]              Who to contact     Please call your clinic at [phone] to:    Ask questions about your health    Make or cancel appointments    Discuss your medicines    Learn about your test results    Speak to your doctor   If you have compliments or concerns about an experience at your clinic, or if you wish to file a complaint, please contact [facility] Patient Relations at [phone] or email us at [email or web address]         Additional Information About Your Visit        MyChart Information     [employer] gives you secure access to your electronic health record. If you see a primary care provider, you can also send messages to your care team and make appointments. If you have questions, please call your  primary care clinic.  If you do not have a primary care provider, please call [phone] and they will assist you.      Live[employer]VP is an electronic gateway that provides easy, online access to your medical records. With Live[employer]VP, you can request a clinic appointment, read your test results, renew a prescription or communicate with your care team.     To access your existing account, please contact your [facility] or call [phone] for assistance.        Care EveryWhere ID     This is your Care EveryWhere ID. This could be used by other organizations to access your [facility] medical records  [id number]         Blood Pressure from Last 3 Encounters:   08/04/17 101/70   07/25/17 108/70   06/28/17 125/81    Weight from Last 3 Encounters:   08/04/17 73.6 kg (162 lb 4.8 oz)   07/25/17 74.8 kg (165 lb)   06/28/17 79.2 kg (174 lb 11.2 oz)              Today, you had the following     No orders found for display         Today's Medication Changes          These changes are accurate as of: 8/15/17  4:25 PM.  If you have any questions, ask your nurse or doctor.               These medicines have changed or have updated prescriptions.        Dose/Directions    hydrALAZINE 25 MG tablet   Commonly known as:  APRESOLINE   This may have changed:    - when to take this  - additional instructions   Used for:  Essential hypertension        Dose:  12.5 mg   Take 0.5 tablets (12.5 mg) by mouth 3 times daily as needed , for systolic blood pressure >140 mmHg.   Quantity:  90 tablet   Refills:  3       vitamin D 52153 UNIT capsule   Commonly known as:  ERGOCALCIFEROL   This may have changed:  additional instructions   Used for:  Vitamin D deficiency        Dose:  36201 Units   Take 1 capsule (50,000 Units) by mouth every 7 days Need a Vitamin D level in 2 months.   Quantity:  8 capsule   Refills:  0                Primary Care Provider Office Phone # Fax #    Kash Solano [phone]        [address] [facility] [address]  [facility] [address]        Equal Access to Services     MANDA QUINN : Hadii aad ku hadkiranharman Willisali, wasolda juanoseiha, tracey jermainsandramilton valenteatiyamilton, gagandeep glasgow sarwatliat greggijeomasusan vincent. So [facility] [phone].    ATENCIÓN: Si habla español, tiene a burch disposición servicios gratuitos de asistencia lingüística. Llame al [phone].    We comply with applicable federal civil rights laws and [place] laws. We do not discriminate on the basis of race, color, national origin, age, disability sex, sexual orientation or gender identity.            Thank you!     Thank you for choosing [facility]  for your care. Our goal is always to provide you with excellent care. Hearing back from our patients is one way we can continue to improve our services. Please take a few minutes to complete the written survey that you may receive in the mail after your visit with us. Thank you!             Your Updated Medication List - Protect others around you: Learn how to safely use, store and throw away your medicines at www.disposemymeds.org.          This list is accurate as of: 8/15/17  4:25 PM.  Always use your most recent med list.                   Brand Name Dispense Instructions for use Diagnosis    acetaminophen 325 MG tablet    TYLENOL    250 tablet    Take 1-2 tablets (325-650 mg) by mouth every 6 hours as needed for pain (headache)    Other chronic pain, Headache(784.0)       AEROSPAN 80 MCG/ACT Aers oral inhaler   Generic drug:  flunisolide HFA      INHALE 2 PUFFS PO BID.  RINSE MOUTH AFTERWARDS        * albuterol 108 (90 BASE) MCG/ACT Inhaler    PROAIR HFA/PROVENTIL HFA/VENTOLIN HFA    3 Inhaler    Inhale 2 puffs into the lungs every 6 hours as needed for shortness of breath / dyspnea or wheezing        * albuterol (2.5 MG/3ML) 0.083% neb solution      INL 1 VIAL VIA NEBULIZATION Q 4 TO 6 HOURS PRN        ASPIRIN LOW DOSE 81 MG EC tablet   Generic drug:  aspirin     90 tablet     TAKE 1 TABLET BY MOUTH EVERY DAY    Cardiomyopathy, unspecified (H)       bacitracin ophthalmic ointment     3.5 g    Apply to incision line three times daily.    Postoperative eye state       blood glucose monitoring lancets     4 Box    Use to test blood sugars 4 times daily or as directed.    Type 2 diabetes, HbA1c goal < 7% (H)       blood glucose monitoring meter device kit    no brand specified    1 kit    Use to test blood sugar 4 X times daily or as directed.    Type 2 diabetes, HbA1c goal < 7% (H)       blood glucose monitoring test strip    no brand specified    360 each    1 strip by In Vitro route 4 times daily Test as directed. Please dispense three months and three months of refills. Thank you.    Type 2 diabetes, HbA1c goal < 7% (H)       clopidogrel 75 MG tablet    PLAVIX    90 tablet    Take 1 tablet (75 mg) by mouth daily        * COMPRESSION STOCKINGS     4 each    2 each daily    Bilateral lower extremity edema, Venous incompetence       * COMPRESSION STOCKINGS     4 each    2 each daily Apply one 10-15 mmHg compression stocking to each lower extgmierty during the day and remove before bedtime.    Venous incompetence, Bilateral lower extremity edema       cyclobenzaprine 10 MG tablet    FLEXERIL    180 tablet    Take 1 tablet (10 mg) by mouth 2 times daily as needed for muscle spasms    Fibromyalgia       desonide 0.05 % cream    DESOWEN     Apply topically 2 times daily        dicyclomine 20 MG tablet    BENTYL    60 tablet    Take 1 tablet (20 mg) by mouth 4 times daily as needed    Other irritable bowel syndrome       diphenhydrAMINE 25 MG capsule    BENADRYL     TK 1 TO 2 CS PO QHS        EPIPEN 2-RIKY 0.3 MG/0.3ML injection 2-pack   Generic drug:  EPINEPHrine      INJECT 0.3 MG INTO THE MUSCLE PRF ANAPHYALAXIS        ferrous gluconate 324 (38 FE) MG tablet    FERGON    180 tablet    Take 1 tablet (324 mg) by mouth 2 times daily    AILEEN (iron deficiency anemia)       fexofenadine 180 MG tablet     ALLEGRA    90 tablet    Take 1 tablet by mouth daily as needed.    Chronic rhinitis       fluocinolone 0.01 % solution    SYNALAR     Apply topically daily as needed        fluocinonide 0.05 % solution    LIDEX    60 mL    Apply topically 2 times daily To areas of itch on the scalp as needed.    Telogen effluvium       hydrALAZINE 25 MG tablet    APRESOLINE    90 tablet    Take 0.5 tablets (12.5 mg) by mouth 3 times daily as needed , for systolic blood pressure >140 mmHg.    Essential hypertension       HYDROcodone-acetaminophen 5-325 MG per tablet    NORCO    10 tablet    Take 1 tablet by mouth every 6 hours as needed for pain Maximum of 4000 mg of acetaminophen in 24 hours.    Postoperative eye state       hydroxychloroquine 200 MG tablet    PLAQUENIL    180 tablet    Take 2 tablets (400 mg) by mouth daily EYE EXAM DUE/LETTER SENT    Inflammatory arthritis       insulin glargine 100 UNIT/ML injection     18 mL    Inject 40 Units Subcutaneous daily    Type 2 diabetes mellitus without complication (H)       insulin pen needle 32G X 4 MM    BD MARCK U/F    300 each    USE DAILY OR AS DIRECTED    Type 2 diabetes, HbA1c goal < 7% (H)       ketoconazole 2 % shampoo    NIZORAL     Apply topically daily as needed        lidocaine 5 % ointment    XYLOCAINE    50 g    Apply 1 g topically 2 times daily as needed for moderate pain    Fibromyalgia, Rheumatoid arthritis involving both wrists with positive rheumatoid factor (H)       lisinopril-hydrochlorothiazide 20-25 MG per tablet    PRINZIDE/ZESTORETIC    90 tablet    TAKE 1 TABLET BY MOUTH DAILY    HTN (hypertension)       Magnesium Oxide -Mg Supplement 250 MG Tabs      TK 1 T PO BID. REDUCE IF STOOLS LOOSEN        metFORMIN 500 MG 24 hr tablet    GLUCOPHAGE-XR    60 tablet    Take 2 tablets (1,000 mg) by mouth daily (with dinner)    Type 2 diabetes mellitus not at goal (H)       metroNIDAZOLE 1 % cream    NORITATE     Apply topically daily        mometasone 50 MCG/ACT  spray    NASONEX     Spray 2 sprays into both nostrils daily        montelukast 10 MG tablet    SINGULAIR    30 tablet    Take 1 tablet (10 mg) by mouth At Bedtime    Uncomplicated asthma, unspecified asthma severity       nitroGLYcerin 0.4 MG sublingual tablet    NITROSTAT    25 tablet    Place 1 tablet (0.4 mg) under the tongue every 5 minutes as needed for chest pain    NSTEMI (non-ST elevated myocardial infarction) (H)       nystatin 321951 UNITS Tabs tablet    MYCOSTATIN     TK 2 TS PO BID        olopatadine 0.1 % ophthalmic solution    PATANOL    5 mL    Place 1 drop into both eyes 2 times daily as needed for allergies.    Chronic rhinitis       pravastatin 40 MG tablet    PRAVACHOL    30 tablet    Take 1 tablet (40 mg) by mouth daily    CAD (coronary artery disease)       ranitidine 150 MG tablet    ZANTAC    60 tablet    Take 1 tablet (150 mg) by mouth 2 times daily    Gastritis       spironolactone 25 MG tablet    ALDACTONE    60 tablet    Take 2 tablets (50 mg) by mouth daily    Hypertension goal BP (blood pressure) < 140/90       sucralfate 1 GM tablet    CARAFATE    120 tablet    Take 1 tablet (1 g) by mouth 4 times daily as needed    Abdominal pain, epigastric       TOPROL XL PO      Take 100 mg by mouth daily        traMADol 50 MG tablet    ULTRAM    60 tablet    Take 1 tablet (50 mg) by mouth every 8 hours as needed for moderate pain    Undifferentiated connective tissue disease (H)       TUMS 500 MG chewable tablet   Generic drug:  calcium carbonate      Take 3-4 chew tab by mouth daily as needed.        vitamin D 34906 UNIT capsule    ERGOCALCIFEROL    8 capsule    Take 1 capsule (50,000 Units) by mouth every 7 days Need a Vitamin D level in 2 months.    Vitamin D deficiency       ZOFRAN PO           * Notice:  This list has 4 medication(s) that are the same as other medications prescribed for you. Read the directions carefully, and ask your doctor or other care provider to review them with you.

## 2017-08-15 NOTE — LETTER
8/15/2017         RE:  :  MRN: Janine Cornell  1965  [id number]     Dear Dr. Vernon and Dr. Mckinnon,    Your patient, Janine Cornell, returned for oculoplastic follow up. My assessment and plan are below.     POD11 s/p right anterior orbitotomy and biopsy with intraorbital injection of kenalog. She states that her headaches, and lack of ability to raise the eye has not resolved. She does note since surgery her retro-orbital pain and episodes of swelling up have resolved. She is still very frustrated with the lack of progress in her care. 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            Assessment & Plan     Janine Cornell is a 52 year old female with the following diagnoses:   1. Lacrimal gland inflammation, right - Right Eye    2. 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 ANCA-associated vasculitis (H)       I had originally explained to Ms. Cornell that repeat biopsy was not with the intention to address her ptosis. This had been emphasized in clinic as well as the preoperative room.  At this point it is likely that she is suffering from nonspecific orbital inflammation. She has been on IV steroids in the past which helped but she had difficulty tapering. Methotrexate was tried and discontinued due to intolerable side effects. She is currently on plaquenil. Given intraorbital kenalog injection has seemed to help, I am happy to repeat these injections if she desires. Finally several months from now, we could consider right upper eyelid ptosis repair - she attributes the ptosis to her inflammation and initial surgery.     Rk Valencia M.D.  PGY-2          Again, thank you for allowing me to participate in the care of your patient.      Sincerely,    Charis Holbrook MD  Department of Ophthalmology and Visual Neurosciences  [facility]      CC: Majo Mckinnon MD  [address] [place] [address]  [address]  [facility] [address]  VIA In Basket     Jas Vernon MD  [address] [facility] [address]  [facility] [address]  VIA In Basket

## 2017-08-15 NOTE — PROGRESS NOTES
Chief Complaints and History of Present Illnesses   Patient presents with     Post Op (Ophthalmology) Right Eye     POD#11 s/p Right anterior orbitotomy with biopsy.  Right intraorbital injection of kenalog       POD11 s/p right anterior orbitotomy and biopsy with intraorbital injection of kenalog. She states that her headaches, and lack of ability to raise the eye has not resolved. She does note since surgery her retro-orbital pain and episodes of swelling up have resolved. She is still very frustrated with the lack of progress in her care. 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          Assessment & Plan     Janine Cornell is a 52 year old female with the following diagnoses:   1. Lacrimal gland inflammation, right - Right Eye    2. 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 ANCA-associated vasculitis (H)       I had originally explained to Ms. Cornell that repeat biopsy was not with the intention to address her ptosis. This had been emphasized in clinic as well as the preoperative room.  At this point it is likely that she is suffering from nonspecific orbital inflammation. She has been on IV steroids in the past which helped but she had difficulty tapering. Methotrexate was tried and discontinued due to intolerable side effects. She is currently on plaquenil. Given intraorbital kenalog injection has seemed to help, I am happy to repeat these injections if she desires. Finally several months from now, we could consider right upper eyelid ptosis repair - she attributes the ptosis to her inflammation and initial surgery.     Rk Valencia M.D.  PGY-2           Complete documentation of historical and exam elements from today's encounter can be found in the full encounter summary report (not reduplicated in this progress note). I personally obtained the chief complaint(s) and history of present illness.  I confirmed  and edited as necessary the review of systems, past medical/surgical history, family history, social history, and examination findings as documented by others; and I examined the patient myself. I personally reviewed the relevant tests, images, and reports as documented above. I formulated and edited as necessary the assessment and plan and discussed the findings and management plan with the patient and family. - Charis Holbrook MD

## 2017-08-15 NOTE — NURSING NOTE
Chief Complaints and History of Present Illnesses   Patient presents with     Post Op (Ophthalmology) Right Eye     POD#11 s/p Right anterior orbitotomy with biopsy.  Right intraorbital injection of kenalog     HPI    Affected eye(s):  Right   Symptoms:     No blurred vision   Floaters   No flashes   No tearing   Itching   No burning         Do you have eye pain now?:  No      Comments:  Patient notes right side of her head hurts and has pressure in the RE.    Patient notes vision issues still present from last visit and hasn't had any changes since sx.    Eva Corley August 15, 2017 1:35 PM

## 2017-08-28 NOTE — ANESTHESIA POSTPROCEDURE EVALUATION
Patient: Janine Cornell    Procedure(s):  RIGHT ORBITOTOMY AND BIOPSY - Wound Class: I-Clean    Diagnosis:ORBITAL INFLAMMATION  Diagnosis Additional Information: No value filed.    Anesthesia Type:  General    Note:  Anesthesia Post Evaluation    Patient location during evaluation: PACU  Patient participation: Able to fully participate in evaluation  Level of consciousness: awake  Pain management: adequate  Airway patency: patent  Cardiovascular status: acceptable  Respiratory status: acceptable  Hydration status: acceptable  PONV: none     Anesthetic complications: None          Last vitals:  Vitals:    08/04/17 1315 08/04/17 1330 08/04/17 1415   BP: 99/66 112/76 101/70   Pulse:      Resp: 14 14 12   Temp:      SpO2: 96% 98% 96%         Electronically Signed By: Shelby Mayfield  August 28, 2017  5:19 AM

## 2017-09-19 ENCOUNTER — TELEPHONE (OUTPATIENT)
Dept: NEPHROLOGY | Facility: CLINIC | Age: 52
End: 2017-09-19

## 2017-09-19 NOTE — TELEPHONE ENCOUNTER
Patient called asking that Desiree RNCC return phone call, ok to leave detailed VM, cell phone preferred.  Shabnam Moncada LPN  Nephrology  [facility]  [phone]

## 2017-09-19 NOTE — TELEPHONE ENCOUNTER
Called and spoke with pt regarding follow up of biopsy with Dr. Mckinnon.  Pt does not think that she wants to follow up at this point, as she does not know what the biopsy is and wants to follow up with the Dr who did the biopsy.  She will follow up with Dr. Mckinnon after September 26th.      Pt does not want to follow up with Dr. Mckinnon at this time and when she does, She is getting frustrated due to the lack of answers.  Pt will call back after she sees Dr. Vizcaino for results on her biopsy.    Desiree Godinez RN  Rheumatology Clinic

## 2017-09-26 ENCOUNTER — OFFICE VISIT (OUTPATIENT)
Dept: OPHTHALMOLOGY | Facility: CLINIC | Age: 52
End: 2017-09-26

## 2017-09-26 DIAGNOSIS — H02.403 ACQUIRED INVOLUTIONAL PTOSIS OF BOTH EYELIDS: Primary | ICD-10-CM

## 2017-09-26 DIAGNOSIS — H05.10: ICD-10-CM

## 2017-09-26 ASSESSMENT — LAGOPHTHALMOS
OS_LAGOPHTHALMOS: 0
OD_LAGOPHTHALMOS: 0

## 2017-09-26 ASSESSMENT — VISUAL ACUITY
OD_CC: 20/20
METHOD: SNELLEN - LINEAR
OS_CC: 20/25

## 2017-09-26 ASSESSMENT — LEVATOR FUNCTION
OD_LEVATOR: 14
OS_LEVATOR: 15

## 2017-09-26 ASSESSMENT — MARGIN REFLEX DISTANCE
OS_MRD1: 3
OD_MRD1: 1

## 2017-09-26 ASSESSMENT — TONOMETRY
OS_IOP_MMHG: 21
IOP_METHOD: ICARE
OD_IOP_MMHG: 21

## 2017-09-26 ASSESSMENT — SLIT LAMP EXAM - LIDS
COMMENTS: NORMAL
COMMENTS: PTOSIS

## 2017-09-26 ASSESSMENT — CUP TO DISC RATIO
OS_RATIO: 0.2
OD_RATIO: 0.2

## 2017-09-26 ASSESSMENT — EXTERNAL EXAM - LEFT EYE: OS_EXAM: NORMAL

## 2017-09-26 NOTE — MR AVS SNAPSHOT
"              After Visit Summary   9/26/2017    Janine Cornell    MRN: [id number]           Patient Information     Date Of Birth          1965        Visit Information        Provider Department      9/26/2017 2:15 PM Charis Holbrook MD [facility] Ophthalmology        Today's Diagnoses     Acquired involutional ptosis of both eyelids    -  1    Idiopathic bilateral orbital inflammatory syndrome           Follow-ups after your visit        Your next 10 appointments already scheduled     Sep 27, 2017  2:15 PM CDT   (Arrive by 2:00 PM)   MA SCREENING DIGITAL BILATERAL with [id number]   [facility] ([facility])    [address]  2nd Floor  [facility] [address] [phone]           Do not use any powder, lotion or deodorant under your arms or on your breast. If you do, we will ask you to remove it before your exam.  Wear comfortable, two-piece clothing.  If you have any allergies, tell your care team.  Bring any previous mammograms from other facilities or have them mailed to the breast center. Three-dimensional (3D) mammograms are available at [place] locations in [facility], [facility], [facility], and [place]. [facility] locations include [place] [facility] in [place]. Benefits of 3D mammograms include: - Improved rate of cancer detection - Decreases your chance of having to go back for more tests, which means fewer: - \"False-positive\" results (This means that there is an abnormal area but it isn't cancer.) - Invasive testing procedures, such as a biopsy or surgery - Can provide clearer images of the breast if you have dense breast tissue. 3D mammography is an optional exam that anyone can have with a 2D mammogram. It doesn't replace or take the place of a 2D mammogram. 2D mammograms remain an effective screening test for all women.  Not all insurance companies cover the cost of a 3D mammogram. " Check with your insurance.            Dec 20, 2017  3:30 PM CST   Lab with  LAB   [facility] ([facility])    [address]  1st Floor  [facility] [id number] [phone]            Dec 20, 2017  4:00 PM CST   (Arrive by 3:45 PM)   Return Visit with Milan Peace MD   [facility] ([facility])    [address]  3rd Floor  [facility] [id number] [phone]              Who to contact     Please call your clinic at [phone] to:    Ask questions about your health    Make or cancel appointments    Discuss your medicines    Learn about your test results    Speak to your doctor   If you have compliments or concerns about an experience at your clinic, or if you wish to file a complaint, please contact [facility] Patient Relations at [phone] or email us at [email or web address]         Additional Information About Your Visit        [employer] Information     [employer] gives you secure access to your electronic health record. If you see a primary care provider, you can also send messages to your care team and make appointments. If you have questions, please call your primary care clinic.  If you do not have a primary care provider, please call [phone] and they will assist you.      [employer] is an electronic gateway that provides easy, online access to your medical records. With [employer], you can request a clinic appointment, read your test results, renew a prescription or communicate with your care team.     To access your existing account, please contact your [facility] or call [phone] for assistance.        Care EveryWhere ID     This is your Care EveryWhere ID. This could be used by other organizations to access your [place] medical records  [id number]         Blood Pressure from Last 3 Encounters:   08/04/17 101/70   07/25/17 108/70   06/28/17 125/81     Weight from Last 3 Encounters:   08/04/17 73.6 kg (162 lb 4.8 oz)   07/25/17 74.8 kg (165 lb)   06/28/17 79.2 kg (174 lb 11.2 oz)              We Performed the Following     Nicole-Operative Worksheet (Plastics)          Today's Medication Changes          These changes are accurate as of: 9/26/17  4:34 PM.  If you have any questions, ask your nurse or doctor.               These medicines have changed or have updated prescriptions.        Dose/Directions    hydrALAZINE 25 MG tablet   Commonly known as:  APRESOLINE   This may have changed:    - when to take this  - additional instructions   Used for:  Essential hypertension        Dose:  12.5 mg   Take 0.5 tablets (12.5 mg) by mouth 3 times daily as needed , for systolic blood pressure >140 mmHg.   Quantity:  90 tablet   Refills:  3       vitamin D 41699 UNIT capsule   Commonly known as:  ERGOCALCIFEROL   This may have changed:  additional instructions   Used for:  Vitamin D deficiency        Dose:  63473 Units   Take 1 capsule (50,000 Units) by mouth every 7 days Need a Vitamin D level in 2 months.   Quantity:  8 capsule   Refills:  0                Primary Care Provider Office Phone # Fax #    Kash Solano [phone]       [address] [address]        Equal Access to Services     MANDA QUINN AH: Dora harkinso Soomaali, waaxda luqadaha, qaybta kaalmada adeegyada, gagandeep vincent. So [facility] [phone].    ATENCIÓN: Si habla español, tiene a burch disposición servicios gratuitos de asistencia lingüística. Llame al [phone].    We comply with applicable federal civil rights laws and [place] laws. We do not discriminate on the basis of race, color, national origin, age, disability sex, sexual orientation or gender identity.            Thank you!     Thank you for choosing [facility] OPHTHALMOLOGY  for your care. Our goal is always to provide you with excellent care. Hearing back from our  patients is one way we can continue to improve our services. Please take a few minutes to complete the written survey that you may receive in the mail after your visit with us. Thank you!             Your Updated Medication List - Protect others around you: Learn how to safely use, store and throw away your medicines at www.disposemymeds.org.          This list is accurate as of: 9/26/17  4:34 PM.  Always use your most recent med list.                   Brand Name Dispense Instructions for use Diagnosis    acetaminophen 325 MG tablet    TYLENOL    250 tablet    Take 1-2 tablets (325-650 mg) by mouth every 6 hours as needed for pain (headache)    Other chronic pain, Headache(784.0)       AEROSPAN 80 MCG/ACT Aers oral inhaler   Generic drug:  flunisolide HFA      INHALE 2 PUFFS PO BID.  RINSE MOUTH AFTERWARDS        * albuterol 108 (90 BASE) MCG/ACT Inhaler    PROAIR HFA/PROVENTIL HFA/VENTOLIN HFA    3 Inhaler    Inhale 2 puffs into the lungs every 6 hours as needed for shortness of breath / dyspnea or wheezing        * albuterol (2.5 MG/3ML) 0.083% neb solution      INL 1 VIAL VIA NEBULIZATION Q 4 TO 6 HOURS PRN        ASPIRIN LOW DOSE 81 MG EC tablet   Generic drug:  aspirin     90 tablet    TAKE 1 TABLET BY MOUTH EVERY DAY    Cardiomyopathy, unspecified (H)       bacitracin ophthalmic ointment     3.5 g    Apply to incision line three times daily.    Postoperative eye state       BASAGLAR 100 UNIT/ML injection     18 mL    Inject 40 Units Subcutaneous daily    Type 2 diabetes mellitus without complication (H)       blood glucose monitoring lancets     4 Box    Use to test blood sugars 4 times daily or as directed.    Type 2 diabetes, HbA1c goal < 7% (H)       blood glucose monitoring meter device kit    no brand specified    1 kit    Use to test blood sugar 4 X times daily or as directed.    Type 2 diabetes, HbA1c goal < 7% (H)       blood glucose monitoring test strip    no brand specified    360 each    1 strip  by In Vitro route 4 times daily Test as directed. Please dispense three months and three months of refills. Thank you.    Type 2 diabetes, HbA1c goal < 7% (H)       clopidogrel 75 MG tablet    PLAVIX    90 tablet    Take 1 tablet (75 mg) by mouth daily        * COMPRESSION STOCKINGS     4 each    2 each daily    Bilateral lower extremity edema, Venous incompetence       * COMPRESSION STOCKINGS     4 each    2 each daily Apply one 10-15 mmHg compression stocking to each lower extgmierty during the day and remove before bedtime.    Venous incompetence, Bilateral lower extremity edema       cyclobenzaprine 10 MG tablet    FLEXERIL    180 tablet    Take 1 tablet (10 mg) by mouth 2 times daily as needed for muscle spasms    Fibromyalgia       desonide 0.05 % cream    DESOWEN     Apply topically 2 times daily        dicyclomine 20 MG tablet    BENTYL    60 tablet    Take 1 tablet (20 mg) by mouth 4 times daily as needed    Other irritable bowel syndrome       diphenhydrAMINE 25 MG capsule    BENADRYL     TK 1 TO 2 CS PO QHS        EPIPEN 2-RIKY 0.3 MG/0.3ML injection 2-pack   Generic drug:  EPINEPHrine      INJECT 0.3 MG INTO THE MUSCLE PRF ANAPHYALAXIS        ferrous gluconate 324 (38 FE) MG tablet    FERGON    180 tablet    Take 1 tablet (324 mg) by mouth 2 times daily    AILEEN (iron deficiency anemia)       fexofenadine 180 MG tablet    ALLEGRA    90 tablet    Take 1 tablet by mouth daily as needed.    Chronic rhinitis       fluocinolone 0.01 % solution    SYNALAR     Apply topically daily as needed        fluocinonide 0.05 % solution    LIDEX    60 mL    Apply topically 2 times daily To areas of itch on the scalp as needed.    Telogen effluvium       hydrALAZINE 25 MG tablet    APRESOLINE    90 tablet    Take 0.5 tablets (12.5 mg) by mouth 3 times daily as needed , for systolic blood pressure >140 mmHg.    Essential hypertension       HYDROcodone-acetaminophen 5-325 MG per tablet    NORCO    10 tablet    Take 1 tablet by  mouth every 6 hours as needed for pain Maximum of 4000 mg of acetaminophen in 24 hours.    Postoperative eye state       hydroxychloroquine 200 MG tablet    PLAQUENIL    180 tablet    Take 2 tablets (400 mg) by mouth daily EYE EXAM DUE/LETTER SENT    Inflammatory arthritis       insulin pen needle 32G X 4 MM    BD MARCK U/F    300 each    USE DAILY OR AS DIRECTED    Type 2 diabetes, HbA1c goal < 7% (H)       ketoconazole 2 % shampoo    NIZORAL     Apply topically daily as needed        lidocaine 5 % ointment    XYLOCAINE    50 g    Apply 1 g topically 2 times daily as needed for moderate pain    Fibromyalgia, Rheumatoid arthritis involving both wrists with positive rheumatoid factor (H)       lisinopril-hydrochlorothiazide 20-25 MG per tablet    PRINZIDE/ZESTORETIC    90 tablet    TAKE 1 TABLET BY MOUTH DAILY    HTN (hypertension)       Magnesium Oxide -Mg Supplement 250 MG Tabs      TK 1 T PO BID. REDUCE IF STOOLS LOOSEN        metFORMIN 500 MG 24 hr tablet    GLUCOPHAGE-XR    60 tablet    Take 2 tablets (1,000 mg) by mouth daily (with dinner)    Type 2 diabetes mellitus not at goal (H)       metroNIDAZOLE 1 % cream    NORITATE     Apply topically daily        mometasone 50 MCG/ACT spray    NASONEX     Spray 2 sprays into both nostrils daily        montelukast 10 MG tablet    SINGULAIR    30 tablet    Take 1 tablet (10 mg) by mouth At Bedtime    Uncomplicated asthma, unspecified asthma severity       nitroGLYcerin 0.4 MG sublingual tablet    NITROSTAT    25 tablet    Place 1 tablet (0.4 mg) under the tongue every 5 minutes as needed for chest pain    NSTEMI (non-ST elevated myocardial infarction) (H)       nystatin 843499 UNITS Tabs tablet    MYCOSTATIN     TK 2 TS PO BID        olopatadine 0.1 % ophthalmic solution    PATANOL    5 mL    Place 1 drop into both eyes 2 times daily as needed for allergies.    Chronic rhinitis       pravastatin 40 MG tablet    PRAVACHOL    30 tablet    Take 1 tablet (40 mg) by mouth  daily    CAD (coronary artery disease)       ranitidine 150 MG tablet    ZANTAC    60 tablet    Take 1 tablet (150 mg) by mouth 2 times daily    Gastritis       spironolactone 25 MG tablet    ALDACTONE    60 tablet    Take 2 tablets (50 mg) by mouth daily    Hypertension goal BP (blood pressure) < 140/90       sucralfate 1 GM tablet    CARAFATE    120 tablet    Take 1 tablet (1 g) by mouth 4 times daily as needed    Abdominal pain, epigastric       TOPROL XL PO      Take 100 mg by mouth daily        traMADol 50 MG tablet    ULTRAM    60 tablet    Take 1 tablet (50 mg) by mouth every 8 hours as needed for moderate pain    Undifferentiated connective tissue disease (H)       TUMS 500 MG chewable tablet   Generic drug:  calcium carbonate      Take 3-4 chew tab by mouth daily as needed.        vitamin D 35412 UNIT capsule    ERGOCALCIFEROL    8 capsule    Take 1 capsule (50,000 Units) by mouth every 7 days Need a Vitamin D level in 2 months.    Vitamin D deficiency       ZOFRAN PO           * Notice:  This list has 4 medication(s) that are the same as other medications prescribed for you. Read the directions carefully, and ask your doctor or other care provider to review them with you.

## 2017-09-26 NOTE — PROGRESS NOTES
Oculoplastic Clinic Patient    Patient: Janine Cornell MRN# [id number]   YOB: 1965 Age: 52 year old   Date of Visit: Sep 26, 2017    CC: Droopy eyelids obstructing vision.  Chief Complaints and History of Present Illnesses   Patient presents with     Post Op (Ophthalmology) Right Eye     s/p Right anterior orbitotomy with biopsy.  Right intraorbital injection of kenalog on 8/4/2017.                 HPI:     Janine Cornell is a 52 year old female who has noted gradual onset of droopy eyelids. She recently underwent repeat lacrimal gland biopsy with me. Intraoperatively there was very little residual gland but a firm white tissue was biopsied. The droopy eyelid is interfering with activities of daily living including driving, and reading. The patient denies double vision, variability of the eyelid position, or dry eye symptoms. She is very unhappy with nearly every provider involved in her care. She feels everything she does results in another set of problems - from Dr. Cyr giving her ptosis and two eyelid creases and dry eye, rheumatology giving her medications that give her all sorts of systemic side effects, and my surgery she feels worsened her ptosis.     EXAM:     MRD1: 1/3  Ptosis right   VISUAL FIELD:  Right eye untaped:12 degrees Right eye taped:48 degrees    Assessment & Plan     Janine Cornell is a 52 year old female with the following diagnoses:   1. Acquired involutional ptosis of both eyelids    2. Idiopathic bilateral orbital inflammatory syndrome       I had a long discussion with her about expectations and attitudes towards future care as I am concerned about her attitude toward prior physicians, myself included. I offered to refer her on to other oculoplastic surgeons within our system if she feels her patient doctor relationship is not satisfactory. I also discussed in detail what can be expected with right upper eyelid ptosis repair and potential risks as that is the focus of her  concern today.     We could proceed with right upper eyelid ptosis repair. I would like to see her one more time to assess stability and re-affirm we are on the same page.     Right ptosis repair MMCR 9.5             PHOTOS DEMONSTRATE:    Aponeurotic blepharoptosis    Complete documentation of historical and exam elements from today's encounter can be found in the full encounter summary report (not reduplicated in this progress note). I personally obtained the chief complaint(s) and history of present illness.  I confirmed and edited as necessary the review of systems, past medical/surgical history, family history, social history, and examination findings as documented by others; and I examined the patient myself. I personally reviewed the relevant tests, images, and reports as documented above. I formulated and edited as necessary the assessment and plan and discussed the findings and management plan with the patient and family. - Charis Holbrook MD      Today with Janine Cornell  and her, I reviewed the indications, risks, benefits, and alternatives of the proposed surgical procedure including, but not limited to, failure obtain the desired result  and need for additional surgery, bleeding, infection, loss of vision, loss of the eye, and the remote possibility of permanent damage to any organ system or death with the use of anesthesia. It told her mild asymmetry is acceptable, but occasionaly further surgery may be necessary. I provided multiple opportunities for the questions, answered all questions to the best of my ability, and confirmed that my answers and my discussion were understood.

## 2017-09-26 NOTE — NURSING NOTE
Chief Complaints and History of Present Illnesses   Patient presents with     Post Op (Ophthalmology) Right Eye     s/p Right anterior orbitotomy with biopsy.  Right intraorbital injection of kenalog on 8/4/2017.     HPI    Affected eye(s):  Right   Symptoms:     No blurred vision      Frequency:  Constant       Do you have eye pain now?:  No      Comments:  7 week postop s/p Right anterior orbitotomy with biopsy.  Right intraorbital injection of kenalog on 8/4/2017. Pt was exposed to pink eye and was prescribed a drop but stung going in her eyes so she stopped using them. Discharge from both eye- more noticeable in the am.     James Quinteros COT 2:49 PM September 26, 2017

## 2017-09-27 ENCOUNTER — RADIANT APPOINTMENT (OUTPATIENT)
Dept: MAMMOGRAPHY | Facility: CLINIC | Age: 52
End: 2017-09-27

## 2017-09-27 DIAGNOSIS — Z12.31 VISIT FOR SCREENING MAMMOGRAM: ICD-10-CM

## 2017-09-28 ENCOUNTER — HOSPITAL ENCOUNTER (OUTPATIENT)
Facility: CLINIC | Age: 52
End: 2017-09-28
Attending: OPHTHALMOLOGY | Admitting: OPHTHALMOLOGY
Payer: COMMERCIAL

## 2017-10-17 ENCOUNTER — TELEPHONE (OUTPATIENT)
Dept: FAMILY MEDICINE | Facility: CLINIC | Age: 52
End: 2017-10-17

## 2017-10-23 DIAGNOSIS — I25.10 CAD (CORONARY ARTERY DISEASE): ICD-10-CM

## 2017-10-23 DIAGNOSIS — E11.9 TYPE 2 DIABETES MELLITUS WITHOUT COMPLICATION (H): ICD-10-CM

## 2017-10-23 DIAGNOSIS — I10 HYPERTENSION GOAL BP (BLOOD PRESSURE) < 140/90: ICD-10-CM

## 2017-10-23 RX ORDER — INSULIN GLARGINE 100 [IU]/ML
40 INJECTION, SOLUTION SUBCUTANEOUS DAILY
Qty: 12 ML | Refills: 2 | Status: SHIPPED | OUTPATIENT
Start: 2017-10-23 | End: 2019-12-05 | Stop reason: ALTCHOICE

## 2017-10-23 NOTE — TELEPHONE ENCOUNTER
Last Written Prescription Date:  6/1/17  Last Fill Quantity: 18ML,   # refills: 3  Last Office Visit : 4/26/17  Future Office visit:  NONE

## 2017-10-24 DIAGNOSIS — M19.90 INFLAMMATORY ARTHRITIS: ICD-10-CM

## 2017-10-24 DIAGNOSIS — E11.9 TYPE 2 DIABETES MELLITUS NOT AT GOAL (H): ICD-10-CM

## 2017-10-24 RX ORDER — SPIRONOLACTONE 25 MG/1
50 TABLET ORAL DAILY
Qty: 60 TABLET | Refills: 11 | Status: SHIPPED | OUTPATIENT
Start: 2017-10-24 | End: 2017-12-20

## 2017-10-26 DIAGNOSIS — M79.7 FIBROMYALGIA: ICD-10-CM

## 2017-10-26 RX ORDER — HYDROXYCHLOROQUINE SULFATE 200 MG/1
400 TABLET, FILM COATED ORAL DAILY
Qty: 180 TABLET | Refills: 1 | Status: SHIPPED | OUTPATIENT
Start: 2017-10-26 | End: 2018-05-30

## 2017-10-26 NOTE — TELEPHONE ENCOUNTER
PLAQUENIL) 200 MG      Last Written Prescription Date:  2/28/17  Last Fill Quantity: 180,   # refills: 0  Last Office Visit : 6/15/17  Future Office visit:  NONE  EYE  EXAM   8/15/17

## 2017-10-28 NOTE — TELEPHONE ENCOUNTER
cyclobenzaprine       Last Written Prescription Date:  11/4/16  Last Fill Quantity: 180,   # refills: 0  Last Office Visit : 6/15/17  Future Office visit:  NONE  Routing refill request to provider for review/approval because:  Drug not on refill protocol or controlled substance

## 2017-10-29 RX ORDER — CYCLOBENZAPRINE HCL 10 MG
10 TABLET ORAL 2 TIMES DAILY PRN
Qty: 180 TABLET | Refills: 0 | Status: SHIPPED | OUTPATIENT
Start: 2017-10-29 | End: 2019-03-06

## 2017-10-30 RX ORDER — PRAVASTATIN SODIUM 40 MG
40 TABLET ORAL DAILY
Qty: 90 TABLET | Refills: 0 | Status: SHIPPED | OUTPATIENT
Start: 2017-10-30 | End: 2017-12-20

## 2017-10-30 NOTE — TELEPHONE ENCOUNTER
LAST VISIT    6/28/17  NEXT VISIT    12/20/17  PLAN             Assessment and Plan:      We discussed the results with patient:  We re-emphasized the importance of a heart healthy diet.  We continue with same medical regimen.  We  gave following written instructions for the intermittent fluid retention:     Take additional Spironolactone 12.5 mg by mouth once daily for 3 days if your weight increases by more than 5 pounds as discussed with Dr. Peace.       Purchase compression stockings, 10-15 mmHg, and apply one to each leg daily. Remove at night.    Return to clinic in 6 months.  Fasting labs will be needed prior to this appointment.

## 2017-11-15 ENCOUNTER — OFFICE VISIT (OUTPATIENT)
Dept: FAMILY MEDICINE | Facility: CLINIC | Age: 52
End: 2017-11-15

## 2017-11-15 VITALS
HEART RATE: 68 BPM | DIASTOLIC BLOOD PRESSURE: 74 MMHG | BODY MASS INDEX: 28.6 KG/M2 | WEIGHT: 161.4 LBS | SYSTOLIC BLOOD PRESSURE: 130 MMHG | HEIGHT: 63 IN

## 2017-11-15 DIAGNOSIS — E55.9 VITAMIN D DEFICIENCY: ICD-10-CM

## 2017-11-15 DIAGNOSIS — E61.1 IRON DEFICIENCY: ICD-10-CM

## 2017-11-15 DIAGNOSIS — I10 BENIGN ESSENTIAL HYPERTENSION: ICD-10-CM

## 2017-11-15 DIAGNOSIS — E11.9 DM TYPE 2, GOAL HBA1C 7%-8% (H): ICD-10-CM

## 2017-11-15 DIAGNOSIS — E11.9 DM TYPE 2, GOAL HBA1C 7%-8% (H): Primary | ICD-10-CM

## 2017-11-15 LAB
ALBUMIN SERPL-MCNC: 4.2 G/DL (ref 3.4–5)
ALP SERPL-CCNC: 77 U/L (ref 40–150)
ALT SERPL W P-5'-P-CCNC: 25 U/L (ref 0–50)
ANION GAP SERPL CALCULATED.3IONS-SCNC: 7 MMOL/L (ref 3–14)
AST SERPL W P-5'-P-CCNC: 15 U/L (ref 0–45)
BASOPHILS # BLD AUTO: 0.1 10E9/L (ref 0–0.2)
BASOPHILS NFR BLD AUTO: 0.5 %
BILIRUB SERPL-MCNC: 0.4 MG/DL (ref 0.2–1.3)
BUN SERPL-MCNC: 22 MG/DL (ref 7–30)
CALCIUM SERPL-MCNC: 9.5 MG/DL (ref 8.5–10.1)
CHLORIDE SERPL-SCNC: 104 MMOL/L (ref 94–109)
CO2 SERPL-SCNC: 26 MMOL/L (ref 20–32)
CREAT SERPL-MCNC: 1.48 MG/DL (ref 0.52–1.04)
CREAT UR-MCNC: 361 MG/DL
DIFFERENTIAL METHOD BLD: NORMAL
EOSINOPHIL # BLD AUTO: 0.5 10E9/L (ref 0–0.7)
EOSINOPHIL NFR BLD AUTO: 5.6 %
ERYTHROCYTE [DISTWIDTH] IN BLOOD BY AUTOMATED COUNT: 14.2 % (ref 10–15)
FERRITIN SERPL-MCNC: 58 NG/ML (ref 8–252)
GFR SERPL CREATININE-BSD FRML MDRD: 37 ML/MIN/1.7M2
GLUCOSE SERPL-MCNC: 120 MG/DL (ref 70–99)
HBA1C MFR BLD: 8.4 % (ref 4.3–6)
HCT VFR BLD AUTO: 37.9 % (ref 35–47)
HGB BLD-MCNC: 12.3 G/DL (ref 11.7–15.7)
IMM GRANULOCYTES # BLD: 0 10E9/L (ref 0–0.4)
IMM GRANULOCYTES NFR BLD: 0.2 %
IRON SATN MFR SERPL: 19 % (ref 15–46)
IRON SERPL-MCNC: 57 UG/DL (ref 35–180)
LYMPHOCYTES # BLD AUTO: 1.9 10E9/L (ref 0.8–5.3)
LYMPHOCYTES NFR BLD AUTO: 20.9 %
MCH RBC QN AUTO: 26.6 PG (ref 26.5–33)
MCHC RBC AUTO-ENTMCNC: 32.5 G/DL (ref 31.5–36.5)
MCV RBC AUTO: 82 FL (ref 78–100)
MICROALBUMIN UR-MCNC: 25 MG/L
MICROALBUMIN/CREAT UR: 6.87 MG/G CR (ref 0–25)
MONOCYTES # BLD AUTO: 0.6 10E9/L (ref 0–1.3)
MONOCYTES NFR BLD AUTO: 6.8 %
NEUTROPHILS # BLD AUTO: 6 10E9/L (ref 1.6–8.3)
NEUTROPHILS NFR BLD AUTO: 66 %
NRBC # BLD AUTO: 0 10*3/UL
NRBC BLD AUTO-RTO: 0 /100
PLATELET # BLD AUTO: 358 10E9/L (ref 150–450)
POTASSIUM SERPL-SCNC: 4.4 MMOL/L (ref 3.4–5.3)
PROT SERPL-MCNC: 7.8 G/DL (ref 6.8–8.8)
RBC # BLD AUTO: 4.63 10E12/L (ref 3.8–5.2)
SODIUM SERPL-SCNC: 137 MMOL/L (ref 133–144)
T4 FREE SERPL-MCNC: 1.04 NG/DL (ref 0.76–1.46)
TIBC SERPL-MCNC: 309 UG/DL (ref 240–430)
TSH SERPL DL<=0.005 MIU/L-ACNC: 0.1 MU/L (ref 0.4–4)
WBC # BLD AUTO: 9.1 10E9/L (ref 4–11)

## 2017-11-15 RX ORDER — DILTIAZEM HYDROCHLORIDE 60 MG/1
TABLET, FILM COATED ORAL
Refills: 3 | COMMUNITY
Start: 2017-10-27 | End: 2019-01-11

## 2017-11-15 RX ORDER — FOLIC ACID 1 MG/1
TABLET ORAL
Refills: 2 | COMMUNITY
Start: 2017-10-23 | End: 2018-04-25

## 2017-11-15 RX ORDER — KETOCONAZOLE 20 MG/G
CREAM TOPICAL PRN
Refills: 3 | COMMUNITY
Start: 2017-10-20 | End: 2019-03-06

## 2017-11-15 RX ORDER — AZELASTINE 1 MG/ML
SPRAY, METERED NASAL
Refills: 0 | COMMUNITY
Start: 2017-06-01 | End: 2019-01-11

## 2017-11-15 ASSESSMENT — PAIN SCALES - GENERAL: PAINLEVEL: MODERATE PAIN (4)

## 2017-11-15 NOTE — MR AVS SNAPSHOT
After Visit Summary   11/15/2017    Janine Cornell    MRN: [id number]           Patient Information     Date Of Birth          1965        Visit Information        Provider Department      11/15/2017 12:05 PM Kash Solano MD [facility]        Today's Diagnoses     DM type 2, goal HbA1c 7%-8% (H)    -  1    Benign essential hypertension        Iron deficiency        Vitamin D deficiency          Care Instructions    [facility]: [phone]     Primary Care Center Medication Refill Request Information:  * Please contact your pharmacy regarding ANY request for medication refills.  ** [facility] Prescription Fax = [phone]  * Please allow 3 business days for routine medication refills.  * Please allow 5 business days for controlled substance medication refills.     Primary Care Center Test Result notification information:  *You will be notified with in 7-10 days of your appointment day regarding the results of your test.  If you are on MyChart you will be notified as soon as the provider has reviewed the results and signed off on them.            Follow-ups after your visit        Additional Services     PHARMACY (MTM) REFERRAL       Your provider has referred you to: **[place] [facility] Scheduling (numerous locations) [phone]   [email or web address]  [facility]: Primary Care Center [facility] [phone]   [email or web address]    Reason for Referral: DM 2 (metform GI side effects, preferred insulin Lantus); polypharmacy    The [place] [facility] department will contact you to schedule an appointment.  You may also schedule the appointment by calling [phone].  For [place] patients, please call [phone] to confirm/schedule your appointment on the next business day.    This service is designed to help you get the most  from your medications.  A specially trained Pharmacist will work closely with you and your providers to solve any questions, concerns, issues or problems related to your medications.    Please bring all of your prescription and non-prescription medications (such as vitamins, over-the-counter medications, and herbals) or a detailed medication list to your appointment.    If you have a glucose meter or other home monitoring information, please also bring this to your appointment (i.e. blood glucose log, blood pressure log, pain log, etc.).                  Follow-up notes from your care team     Return in about 3 months (around 2/15/2018).      Your next 10 appointments already scheduled     Nov 15, 2017  1:45 PM CST   LAB with [facility] [facility]    [address] [place] [id number] [phone]           Please do not eat 10-12 hours before your appointment if you are coming in fasting for labs on lipids, cholesterol, or glucose (sugar). This does not apply to pregnant women. Water, hot tea and black coffee (with nothing added) are okay. Do not drink other fluids, diet soda or chew gum.            Nov 17, 2017   Procedure with Myron Cyr MD   [facility] (--)    [address]  [facility] [address]   [phone]            Dec 20, 2017  3:30 PM CST   Lab with  LAB   [facility] ([facility])    [address] [place] [id number] [phone]            Dec 20, 2017  4:00 PM CST   (Arrive by 3:45 PM)   Return Visit with Milan Peace MD   [facility] ([place] [facility])    [address] [place] [address]  [address] [facility] [address] [phone]            Feb 14, 2018  2:05 PM CST   (Arrive by 1:50 PM)   Return Visit with Kash Solano MD   [facility] ([facility])    [address] [place] [address]  [address] [facility] [id number] [phone]              Future tests that were ordered for you today     Open Future Orders        Priority Expected Expires Ordered    Iron and iron binding capacity Routine 11/15/2017 11/15/2018 11/15/2017    Ferritin Routine 11/15/2017 11/15/2018 11/15/2017    Vitamin D Deficiency Routine 11/15/2017 11/15/2018 11/15/2017    HEMOGLOBIN A1C -(Today) Routine 11/15/2017 11/15/2018 11/15/2017    Comprehensive metabolic panel Routine 11/15/2017 11/29/2017 11/15/2017    TSH with free T4 reflex Routine 11/15/2017 11/29/2017 11/15/2017    CBC with platelets differential Routine 11/15/2017 11/29/2017 11/15/2017            Who to contact     Please call your clinic at [phone] to:    Ask questions about your health    Make or cancel appointments    Discuss your medicines    Learn about your test results    Speak to your doctor   If you have compliments or concerns about an experience at your clinic, or if you wish to file a complaint, please contact [facility] Patient Relations at [phone] or email us at [email or web address]         Additional Information About Your Visit        [employer] Information     [employer] gives you secure access to your electronic health record. If you see a primary care provider, you can also send messages to your care team and make appointments. If you have questions, please call your primary care clinic.  If you do not have a primary care provider, please call [phone] and they will assist you.      [employer] is an electronic gateway that provides easy, online access to your medical records. With [employer], you can request a clinic appointment, read your test results, renew a prescription or communicate with your care team.     To access your existing account, please contact your [facility] or call [phone] for assistance.        Care EveryWhere ID     This is your  "Care EveryWhere ID. This could be used by other organizations to access your [facility] medical records  [id number]        Your Vitals Were     Pulse Height BMI (Body Mass Index)             68 1.6 m (5' 3\") 28.59 kg/m2          Blood Pressure from Last 3 Encounters:   11/15/17 130/74   08/04/17 101/70   07/25/17 108/70    Weight from Last 3 Encounters:   11/15/17 73.2 kg (161 lb 6.4 oz)   08/04/17 73.6 kg (162 lb 4.8 oz)   07/25/17 74.8 kg (165 lb)              We Performed the Following     Albumin Random Urine Quantitative with Creat Ratio     PHARMACY ([facility]) REFERRAL          Today's Medication Changes          These changes are accurate as of: 11/15/17  1:31 PM.  If you have any questions, ask your nurse or doctor.               These medicines have changed or have updated prescriptions.        Dose/Directions    vitamin D 61354 UNIT capsule   Commonly known as:  ERGOCALCIFEROL   This may have changed:  additional instructions   Used for:  Vitamin D deficiency        Dose:  88175 Units   Take 1 capsule (50,000 Units) by mouth every 7 days Need a Vitamin D level in 2 months.   Quantity:  8 capsule   Refills:  0         Stop taking these medicines if you haven't already. Please contact your care team if you have questions.     hydrALAZINE 25 MG tablet   Commonly known as:  APRESOLINE   Stopped by:  Kash Solano MD                    Primary Care Provider Office Phone # Fax #    Kash Solano [phone]       [address] [address]        Equal Access to Services     [facility] : Hadii frederick wallace hadkirano Sojunaid, waaxda luqadaha, qaybta kaalmada andrewda, gagandeep iditristan vincent. So [facility] [phone].    ATENCIÓN: Si habla español, tiene a burch disposición servicios gratuitos de asistencia lingüística. Llame al [phone].    We comply with applicable federal civil rights laws and [place] laws. We do not discriminate on the basis of race, " color, national origin, age, disability, sex, sexual orientation, or gender identity.            Thank you!     Thank you for choosing [facility]  for your care. Our goal is always to provide you with excellent care. Hearing back from our patients is one way we can continue to improve our services. Please take a few minutes to complete the written survey that you may receive in the mail after your visit with us. Thank you!             Your Updated Medication List - Protect others around you: Learn how to safely use, store and throw away your medicines at www.disposemymeds.org.          This list is accurate as of: 11/15/17  1:31 PM.  Always use your most recent med list.                   Brand Name Dispense Instructions for use Diagnosis    acetaminophen 325 MG tablet    TYLENOL    250 tablet    Take 1-2 tablets (325-650 mg) by mouth every 6 hours as needed for pain (headache)    Other chronic pain, Headache(784.0)       AEROSPAN 80 MCG/ACT Aers oral inhaler   Generic drug:  flunisolide HFA      INHALE 2 PUFFS PO BID.  RINSE MOUTH AFTERWARDS        * albuterol 108 (90 BASE) MCG/ACT Inhaler    PROAIR HFA/PROVENTIL HFA/VENTOLIN HFA    3 Inhaler    Inhale 2 puffs into the lungs every 6 hours as needed for shortness of breath / dyspnea or wheezing        * albuterol (2.5 MG/3ML) 0.083% neb solution      INL 1 VIAL VIA NEBULIZATION Q 4 TO 6 HOURS PRN        ASPIRIN LOW DOSE 81 MG EC tablet   Generic drug:  aspirin     90 tablet    TAKE 1 TABLET BY MOUTH EVERY DAY    Cardiomyopathy, unspecified       azelastine 0.1 % spray    ASTELIN     U 2 SPRAYS IEN BID        bacitracin ophthalmic ointment     3.5 g    Apply to incision line three times daily.    Postoperative eye state       * BASAGLAR 100 UNIT/ML injection     18 mL    Inject 40 Units Subcutaneous daily    Type 2 diabetes mellitus without complication (H)       * BASAGLAR 100 UNIT/ML injection     12 mL    Inject 40 Units Subcutaneous daily     Type 2 diabetes mellitus without complication (H)       blood glucose monitoring lancets     4 Box    Use to test blood sugars 4 times daily or as directed.    Type 2 diabetes, HbA1c goal < 7% (H)       blood glucose monitoring meter device kit    no brand specified    1 kit    Use to test blood sugar 4 X times daily or as directed.    Type 2 diabetes, HbA1c goal < 7% (H)       blood glucose monitoring test strip    no brand specified    360 each    1 strip by In Vitro route 4 times daily Test as directed. Please dispense three months and three months of refills. Thank you.    Type 2 diabetes, HbA1c goal < 7% (H)       clopidogrel 75 MG tablet    PLAVIX    90 tablet    Take 1 tablet (75 mg) by mouth daily        * COMPRESSION STOCKINGS     4 each    2 each daily    Bilateral lower extremity edema, Venous incompetence       * COMPRESSION STOCKINGS     4 each    2 each daily Apply one 10-15 mmHg compression stocking to each lower extgmierty during the day and remove before bedtime.    Venous incompetence, Bilateral lower extremity edema       cyclobenzaprine 10 MG tablet    FLEXERIL    180 tablet    Take 1 tablet (10 mg) by mouth 2 times daily as needed for muscle spasms    Fibromyalgia       desonide 0.05 % cream    DESOWEN     Apply topically 2 times daily        dicyclomine 20 MG tablet    BENTYL    60 tablet    Take 1 tablet (20 mg) by mouth 4 times daily as needed    Other irritable bowel syndrome       diphenhydrAMINE 25 MG capsule    BENADRYL     TK 1 TO 2 CS PO QHS        EPIPEN 2-RIKY 0.3 MG/0.3ML injection 2-pack   Generic drug:  EPINEPHrine      INJECT 0.3 MG INTO THE MUSCLE PRF ANAPHYALAXIS        ferrous gluconate 324 (38 FE) MG tablet    FERGON    180 tablet    Take 1 tablet (324 mg) by mouth 2 times daily    AILEEN (iron deficiency anemia)       fexofenadine 180 MG tablet    ALLEGRA    90 tablet    Take 1 tablet by mouth daily as needed.    Chronic rhinitis       fluocinolone 0.01 % solution    SYNALAR      Apply topically daily as needed        fluocinonide 0.05 % solution    LIDEX    60 mL    Apply topically 2 times daily To areas of itch on the scalp as needed.    Telogen effluvium       folic acid 1 MG tablet    FOLVITE     TK 1 T PO D        HYDROcodone-acetaminophen 5-325 MG per tablet    NORCO    10 tablet    Take 1 tablet by mouth every 6 hours as needed for pain Maximum of 4000 mg of acetaminophen in 24 hours.    Postoperative eye state       hydroxychloroquine 200 MG tablet    PLAQUENIL    180 tablet    Take 2 tablets (400 mg) by mouth daily    Inflammatory arthritis       insulin pen needle 32G X 4 MM    BD MARCK U/F    300 each    USE DAILY OR AS DIRECTED    Type 2 diabetes, HbA1c goal < 7% (H)       * ketoconazole 2 % shampoo    NIZORAL     Apply topically daily as needed        * ketoconazole 2 % cream    NIZORAL          lidocaine 5 % ointment    XYLOCAINE    50 g    Apply 1 g topically 2 times daily as needed for moderate pain    Fibromyalgia, Rheumatoid arthritis involving both wrists with positive rheumatoid factor (H)       lisinopril-hydrochlorothiazide 20-25 MG per tablet    PRINZIDE/ZESTORETIC    90 tablet    TAKE 1 TABLET BY MOUTH DAILY    HTN (hypertension)       Magnesium Oxide -Mg Supplement 250 MG Tabs      TK 1 T PO BID. REDUCE IF STOOLS LOOSEN        metFORMIN 500 MG 24 hr tablet    GLUCOPHAGE-XR    60 tablet    Take 2 tablets (1,000 mg) by mouth daily (with dinner)    Type 2 diabetes mellitus not at goal (H)       * metroNIDAZOLE 1 % cream    NORITATE     Apply topically daily        * metroNIDAZOLE 0.75 % cream    METROCREAM     CECI EXT TO FACE BID        mometasone 50 MCG/ACT spray    NASONEX     Spray 2 sprays into both nostrils daily        montelukast 10 MG tablet    SINGULAIR    30 tablet    Take 1 tablet (10 mg) by mouth At Bedtime    Uncomplicated asthma, unspecified asthma severity       nitroGLYcerin 0.4 MG sublingual tablet    NITROSTAT    25 tablet    Place 1 tablet (0.4 mg)  under the tongue every 5 minutes as needed for chest pain    NSTEMI (non-ST elevated myocardial infarction) (H)       nystatin 168803 UNITS Tabs tablet    MYCOSTATIN     TK 2 TS PO BID        olopatadine 0.1 % ophthalmic solution    PATANOL    5 mL    Place 1 drop into both eyes 2 times daily as needed for allergies.    Chronic rhinitis       pravastatin 40 MG tablet    PRAVACHOL    90 tablet    Take 1 tablet (40 mg) by mouth daily    CAD (coronary artery disease)       ranitidine 150 MG tablet    ZANTAC    60 tablet    Take 1 tablet (150 mg) by mouth 2 times daily    Gastritis       spironolactone 25 MG tablet    ALDACTONE    60 tablet    Take 2 tablets (50 mg) by mouth daily    Hypertension goal BP (blood pressure) < 140/90       sucralfate 1 GM tablet    CARAFATE    120 tablet    Take 1 tablet (1 g) by mouth 4 times daily as needed    Abdominal pain, epigastric       SYMBICORT 80-4.5 MCG/ACT Inhaler   Generic drug:  budesonide-formoterol      INHALE 2 PUFFS PO BID RINSE AND EXPECTORATE AFTER        TOPROL XL PO      Take 100 mg by mouth daily        traMADol 50 MG tablet    ULTRAM    60 tablet    Take 1 tablet (50 mg) by mouth every 8 hours as needed for moderate pain    Undifferentiated connective tissue disease (H)       TUMS 500 MG chewable tablet   Generic drug:  calcium carbonate      Take 3-4 chew tab by mouth daily as needed.        vitamin D 64787 UNIT capsule    ERGOCALCIFEROL    8 capsule    Take 1 capsule (50,000 Units) by mouth every 7 days Need a Vitamin D level in 2 months.    Vitamin D deficiency       ZOFRAN PO           * Notice:  This list has 10 medication(s) that are the same as other medications prescribed for you. Read the directions carefully, and ask your doctor or other care provider to review them with you.

## 2017-11-15 NOTE — LETTER
11/15/2017    RE: Janine Cornell  [address] [facility] [address]       Surgeon (please enter first and last name): Dr Myron Cyr  Fax number for Preop Evaluation: [phone]  Location of Surgery: [facility]  Date of Surgery: 11/17/17  Procedure: repair ptosis  History of reaction to anesthesia? Nausea and vomiting    Here for preop.  Right eyelid raising procedure, droops enough impairs vision, for months.     No personal or FH easy bleeding or anesthesia reaction except she gets n/v w/ anesthesia        Past Medical History:   Diagnosis Date     Abnormal glandular Papanicolaou smear of cervix 1992     Allergic rhinitis     Allergy, airborne subst     Arthritis      ASCVD (arteriosclerotic cardiovascular disease)      Chronic pain      Chronic pancreatitis (H)     idiopathic, Tx: PPI, antioxidants, pancreatic enzymes     Common migraine      Coronary artery disease      Costochondritis      Costochondritis      Difficulty in walking(719.7)      Dyspnea on exertion      Ectasia, mammary duct     [facility], [facility]     Elevated fasting glucose      Gastro-oesophageal reflux disease      Hernia      History of angina      Hyperlipidemia LDL goal < 100      Hypertension goal BP (blood pressure) < 140/90     Essential hypertension     Iron deficiency anemia      Ischemic cardiomyopathy      Menorrhagia      Migraine headaches      Mild persistent asthma      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 Matias, neg w/u, Rx'd w/prednisone     Ventral hernia, unspecified, without mention of obstruction or gangrene 2012     Past Surgical History:   Procedure Laterality Date     C ECHO HEART XTHORACIC,COMPLETE, W/O DOPPLER  04    [facility] WNL, EF 60%     C/SECTION, LOW TRANSVERSE           CARDIAC SURGERY      cardiac stent- recent in 16; 4 other stents     DILATION AND CURETTAGE N/A 2016    Procedure: DILATION AND CURETTAGE;  Surgeon: Nahed Butler MD;  [facility]     HC UGI ENDOSCOPY W EUS  08    Dr. Pastrana, possible chronic pancreatitis, fatty liver     HERNIA REPAIR  2012    done at [facility]     INSERT INTRAUTERINE DEVICE N/A 2016    Procedure: INSERT INTRAUTERINE DEVICE;  Surgeon: Nahed Butler MD;  [facility]     INT UTERINE FIBRIOD EMBOLIZATION  10/29/2014     LAPAROSCOPIC CHOLECYSTECTOMY  08    Dr. Arnol GRUBBS TX, CERVICAL      s/p LEEP     ORBITOTOMY Right 3/15/2016    Procedure: ORBITOTOMY;  Surgeon: Myron Cyr MD;  Location: [facility]     ORBITOTOMY Right 2017    Procedure: ORBITOTOMY;  RIGHT ORBITOTOMY AND BIOPSY;  Surgeon: Charis Holbrook MD;  Location: [facility]     UPPER GI ENDOSCOPY  10/21/08    mild gastritis, Dr. Hidalgo     Current Outpatient Prescriptions   Medication     ketoconazole (NIZORAL) 2 % cream     metroNIDAZOLE (METROCREAM) 0.75 % cream     folic acid (FOLVITE) 1 MG tablet     azelastine (ASTELIN) 0.1 % spray     SYMBICORT 80-4.5 MCG/ACT Inhaler     pravastatin (PRAVACHOL) 40 MG tablet     cyclobenzaprine (FLEXERIL) 10 MG tablet     hydroxychloroquine (PLAQUENIL) 200 MG tablet     spironolactone (ALDACTONE) 25 MG tablet     BASAGLAR 100 UNIT/ML injection     Metoprolol Succinate (TOPROL XL PO)     HYDROcodone-acetaminophen (NORCO)  5-325 MG per tablet     bacitracin ophthalmic ointment     COMPRESSION STOCKINGS     COMPRESSION STOCKINGS     hydrALAZINE (APRESOLINE) 25 MG tablet     insulin pen needle (BD MARCK U/F) 32G X 4 MM     insulin glargine (BASAGLAR KWIKPEN) 100 UNIT/ML injection     ranitidine (ZANTAC) 150 MG tablet     lidocaine (XYLOCAINE) 5 % ointment     traMADol (ULTRAM) 50 MG tablet     ferrous gluconate (FERGON) 324 (38 FE) MG tablet     ASPIRIN LOW DOSE 81 MG EC tablet     blood glucose monitoring (ONE TOUCH DELICA) lancets     vitamin D (ERGOCALCIFEROL) 76797 UNIT capsule     clopidogrel (PLAVIX) 75 MG tablet     lisinopril-hydrochlorothiazide (PRINZIDE/ZESTORETIC) 20-25 MG per tablet     blood glucose monitoring (NO BRAND SPECIFIED) meter device kit     blood glucose monitoring (NO BRAND SPECIFIED) test strip     diphenhydrAMINE (BENADRYL) 25 MG capsule     EPIPEN 2-RIKY 0.3 MG/0.3ML injection     AEROSPAN 80 MCG/ACT AERS     Magnesium Oxide -Mg Supplement 250 MG TABS     nystatin (MYCOSTATIN) 455984 UNITS TABS tablet     albuterol (2.5 MG/3ML) 0.083% neb solution     dicyclomine (BENTYL) 20 MG tablet     sucralfate (CARAFATE) 1 GM tablet     fluocinolone (SYNALAR) 0.01 % solution     mometasone (NASONEX) 50 MCG/ACT spray     Ondansetron HCl (ZOFRAN PO)     metFORMIN (GLUCOPHAGE-XR) 500 MG 24 hr tablet     montelukast (SINGULAIR) 10 MG tablet     acetaminophen (TYLENOL) 325 MG tablet     metroNIDAZOLE (NORITATE) 1 % cream     desonide (DESOWEN) 0.05 % cream     ketoconazole (NIZORAL) 2 % shampoo     fluocinonide (LIDEX) 0.05 % external solution     nitroglycerin (NITROSTAT) 0.4 MG SL tablet     albuterol (PROAIR HFA, PROVENTIL HFA, VENTOLIN HFA) 108 (90 BASE) MCG/ACT inhaler     calcium carbonate (TUMS) 500 MG chewable tablet     fexofenadine (ALLEGRA) 180 MG tablet     olopatadine (PATANOL) 0.1 % ophthalmic solution     No current facility-administered medications for this visit.      Allergies   Allergen Reactions      Amoxicillin-Pot Clavulanate      Augmentin      Unknown possible hives and edema     Azithromycin      Diatrizoate Other (See Comments)     Pt wants this listed because she is allergic to shellfish      Imitrex [Sumatriptan]      Severe face/neck/chest tightness and flushing side effects      Penicillins Hives     Unknown      Pork Allergy      Stomach pain, cramping, diarrhea, itching, nausea and headaches     Shellfish Allergy Hives and Swelling     Sulfa Drugs Hives and Swelling     Zithromax [Azithromycin Dihydrate] Swelling     Unknown      Family History   Problem Relation Age of Onset     HEART DISEASE Father 50     heart attack     CEREBROVASCULAR DISEASE Father      DIABETES Father      Hypertension Father      Depression Father      C.A.D. Father      Hypertension Mother      Arthritis Mother      HEART DISEASE Mother      long qt syndrome     Thyroid Disease Mother      C.A.D. Mother      HEART DISEASE Brother 15     MI at 15, 16.      DIABETES Maternal Uncle      Hypertension Maternal Aunt      Hypertension Maternal Uncle      Arthritis Brother      he passed away, had severe arthritis at age 11     Arthritis Maternal Uncle      Eye Disorder Maternal Uncle      cataracts     Neurologic Disorder Sister      migraines     Neurologic Disorder Sister      migraines     Respiratory Son      asthma     CEREBROVASCULAR DISEASE Maternal Uncle      C.A.D. Brother      Family History Negative Other      neg for RA, SLE     Unknown/Adopted No family hx of      unknown neurological issues in her family, mother was adopted     Skin Cancer No family hx of      No known family hx of skin cancer     Social History     Social History     Marital status: Single     Spouse name: N/A     Number of children: 1     Years of education: N/A     Occupational History      None      Social History Main Topics     Smoking status: Former Smoker     Packs/day: 0.20     Years: 1.00     Types: Cigarettes     Quit date: 2016  "    Smokeless tobacco: Never Used     Alcohol use No     Drug use: No     Sexual activity: Not Currently     Other Topics Concern     Parent/Sibling W/ Cabg, Mi Or Angioplasty Before 65f 55m? Yes     Social History Narrative    Balanced Diet - sometimes    Osteoporosis Prevention Measures - Dairy servings per day: 2 servings weekly    Regular Exercise -  Yes Describe walking 4 times a week    Dental Exam - NO    Seatbelts used - Yes    Self Breast Exam - Yes    Abuse: Current or Past (Physical, Sexual or Emotional)- No    Do you have any concerns about STD's -  No    Do you feel safe in your environment - No    Guns stored in the home - No    Sunscreen used - Yes    Lipids -  YES - Date: 053102    Glucose -  YES - Date: 012804    Eye Exam - YES - Date: one year ago    Colon Cancer Screening - No    Hemoccults - NO    Pap Test -  YES - Date: 070904, remote history of LEEP    Mammography - YES - Date: last spring, would like to discuss, needs a referral to [facility]    DEXA - NO    Immunizations reviewed and up to date - Yes, last td given in 1997 or 1998       ROS: ten pointt ROS completed, o/w neg    /74  Pulse 68  Ht 1.6 m (5' 3\")  Wt 73.2 kg (161 lb 6.4 oz)  BMI 28.59 kg/m2  Exam:  Constitutional: healthy, alert and no distress  Head: Normocephalic. No masses, lesions, tenderness or abnormalities  Neck: Neck supple. No adenopathy. Thyroid symmetric, normal size,, Carotids without bruits.  ENT: ENT exam normal, no neck nodes or sinus tenderness  Cardiovascular: negative, PMI normal. No lifts, heaves, or thrills. RRR. No murmurs, clicks gallops or rub  Respiratory: negative, Percussion normal. Good diaphragmatic excursion. Lungs clear  Gastrointestinal: Abdomen soft, non-tender. BS normal. No masses, organomegaly  : Deferred  Musculoskeletal: extremities normal- no gross deformities noted, gait normal and normal muscle tone  Skin: no suspicious lesions or rashes  Neurologic: Gait normal. " Reflexes normal and symmetric. Sensation grossly WNL.  Psychiatric: mentation appears normal and affect normal/bright  Hematologic/Lymphatic/Immunologic: Normal cervical lymph nodes    A/P  July 31 normal stress test, in [facility]  Labs today: include cbc, cmet in EPIC, rvwd, stable renal fcn from July 25  She's stopped Plavix, asa already, will be off each for at least a week before surgery  Metformin: will skip dose before surgery  Lantus: uses in am, will just use half dose (20 u) am of surgery, instead of usual dose (40 u)  Cleared for surgery on a medical basis    She'll see MTM for DM and for polypharmacy    Will recheck BMP next visit    See me three mo    Kash Solano MD

## 2017-11-15 NOTE — PATIENT INSTRUCTIONS
[facility]: [phone]     [facility] Medication Refill Request Information:  * Please contact your pharmacy regarding ANY request for medication refills.  ** [facility] Prescription Fax = [phone]  * Please allow 3 business days for routine medication refills.  * Please allow 5 business days for controlled substance medication refills.     [facility] Test Result notification information:  *You will be notified with in 7-10 days of your appointment day regarding the results of your test.  If you are on MyChart you will be notified as soon as the provider has reviewed the results and signed off on them.

## 2017-11-15 NOTE — NURSING NOTE
Chief Complaint   Patient presents with     Pre-Op Exam     pt having surgery to repair ptosis on 11/17/17 by Dr Myron Cyr at [facility] at 12:29 PM on 11/15/2017.

## 2017-11-15 NOTE — PROGRESS NOTES
Surgeon (please enter first and last name): Dr Myron Cyr  Fax number for Preop Evaluation: [phone]  Location of Surgery: [facility]  Date of Surgery: 11/17/17  Procedure: repair ptosis  History of reaction to anesthesia? Nausea and vomiting    Here for preop.  Right eyelid raising procedure, droops enough impairs vision, for months.     No personal or FH easy bleeding or anesthesia reaction except she gets n/v w/ anesthesia        Past Medical History:   Diagnosis Date     Abnormal glandular Papanicolaou smear of cervix 1992     Allergic rhinitis     Allergy, airborne subst     Arthritis      ASCVD (arteriosclerotic cardiovascular disease)      Chronic pain      Chronic pancreatitis (H)     idiopathic, Tx: PPI, antioxidants, pancreatic enzymes     Common migraine      Coronary artery disease      Costochondritis      Costochondritis      Difficulty in walking(719.7)      Dyspnea on exertion      Ectasia, mammary duct     [facility], persistent n[facility] discharge     Elevated fasting glucose      Gastro-oesophageal reflux disease      Hernia      History of angina      Hyperlipidemia LDL goal < 100      Hypertension goal BP (blood pressure) < 140/90     Essential hypertension     Iron deficiency anemia      Ischemic cardiomyopathy      Menorrhagia      Migraine headaches      Mild persistent asthma      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 Matias, neg w/u, Rx'd w/prednisone     Ventral hernia, unspecified, without mention of obstruction or gangrene 2012     Past Surgical History:   Procedure Laterality Date     C ECHO HEART XTHORACIC,COMPLETE, W/O DOPPLER  04    [facility] WNL, EF 60%     C/SECTION, LOW TRANSVERSE           CARDIAC SURGERY      cardiac stent- recent in 16; 4 other stents     DILATION AND CURETTAGE N/A 2016    Procedure: DILATION AND CURETTAGE;  Surgeon: Nahed Butler MD;  [facility]     HC UGI ENDOSCOPY W EUS  08    Dr. Pastrana, possible chronic pancreatitis, fatty liver     HERNIA REPAIR  2012    done at [facility]     INSERT INTRAUTERINE DEVICE N/A 2016    Procedure: INSERT INTRAUTERINE DEVICE;  Surgeon: Nahed Btuler MD;  [facility]     INT UTERINE FIBRIOD EMBOLIZATION  10/29/2014     LAPAROSCOPIC CHOLECYSTECTOMY  08    Dr. Arnol GRUBBS TX, CERVICAL      s/p LEEP     ORBITOTOMY Right 3/15/2016    Procedure: ORBITOTOMY;  Surgeon: Myron Cyr MD;  Location: [facility]     ORBITOTOMY Right 2017    Procedure: ORBITOTOMY;  RIGHT ORBITOTOMY AND BIOPSY;  Surgeon: Charis Holbrook MD;  Location: [facility]     UPPER GI ENDOSCOPY  10/21/08    mild gastritis, Dr. Hidalgo     Current Outpatient Prescriptions   Medication     ketoconazole (NIZORAL) 2 % cream     metroNIDAZOLE (METROCREAM) 0.75 % cream     folic acid (FOLVITE) 1 MG tablet     azelastine (ASTELIN) 0.1 % spray     SYMBICORT 80-4.5 MCG/ACT Inhaler     pravastatin (PRAVACHOL) 40 MG tablet     cyclobenzaprine (FLEXERIL) 10 MG tablet     hydroxychloroquine (PLAQUENIL) 200 MG tablet     spironolactone (ALDACTONE) 25 MG tablet     BASAGLAR 100 UNIT/ML injection     Metoprolol Succinate (TOPROL XL PO)     HYDROcodone-acetaminophen (NORCO) 5-325 MG per tablet     bacitracin ophthalmic ointment     COMPRESSION STOCKINGS      COMPRESSION STOCKINGS     hydrALAZINE (APRESOLINE) 25 MG tablet     insulin pen needle (BD MARCK U/F) 32G X 4 MM     insulin glargine (BASAGLAR KWIKPEN) 100 UNIT/ML injection     ranitidine (ZANTAC) 150 MG tablet     lidocaine (XYLOCAINE) 5 % ointment     traMADol (ULTRAM) 50 MG tablet     ferrous gluconate (FERGON) 324 (38 FE) MG tablet     ASPIRIN LOW DOSE 81 MG EC tablet     blood glucose monitoring (ONE TOUCH DELICA) lancets     vitamin D (ERGOCALCIFEROL) 30182 UNIT capsule     clopidogrel (PLAVIX) 75 MG tablet     lisinopril-hydrochlorothiazide (PRINZIDE/ZESTORETIC) 20-25 MG per tablet     blood glucose monitoring (NO BRAND SPECIFIED) meter device kit     blood glucose monitoring (NO BRAND SPECIFIED) test strip     diphenhydrAMINE (BENADRYL) 25 MG capsule     EPIPEN 2-RIKY 0.3 MG/0.3ML injection     AEROSPAN 80 MCG/ACT AERS     Magnesium Oxide -Mg Supplement 250 MG TABS     nystatin (MYCOSTATIN) 748004 UNITS TABS tablet     albuterol (2.5 MG/3ML) 0.083% neb solution     dicyclomine (BENTYL) 20 MG tablet     sucralfate (CARAFATE) 1 GM tablet     fluocinolone (SYNALAR) 0.01 % solution     mometasone (NASONEX) 50 MCG/ACT spray     Ondansetron HCl (ZOFRAN PO)     metFORMIN (GLUCOPHAGE-XR) 500 MG 24 hr tablet     montelukast (SINGULAIR) 10 MG tablet     acetaminophen (TYLENOL) 325 MG tablet     metroNIDAZOLE (NORITATE) 1 % cream     desonide (DESOWEN) 0.05 % cream     ketoconazole (NIZORAL) 2 % shampoo     fluocinonide (LIDEX) 0.05 % external solution     nitroglycerin (NITROSTAT) 0.4 MG SL tablet     albuterol (PROAIR HFA, PROVENTIL HFA, VENTOLIN HFA) 108 (90 BASE) MCG/ACT inhaler     calcium carbonate (TUMS) 500 MG chewable tablet     fexofenadine (ALLEGRA) 180 MG tablet     olopatadine (PATANOL) 0.1 % ophthalmic solution     No current facility-administered medications for this visit.      Allergies   Allergen Reactions     Amoxicillin-Pot Clavulanate      Augmentin      Unknown possible hives and edema      Azithromycin      Diatrizoate Other (See Comments)     Pt wants this listed because she is allergic to shellfish      Imitrex [Sumatriptan]      Severe face/neck/chest tightness and flushing side effects      Penicillins Hives     Unknown      Pork Allergy      Stomach pain, cramping, diarrhea, itching, nausea and headaches     Shellfish Allergy Hives and Swelling     Sulfa Drugs Hives and Swelling     Zithromax [Azithromycin Dihydrate] Swelling     Unknown      Family History   Problem Relation Age of Onset     HEART DISEASE Father 50     heart attack     CEREBROVASCULAR DISEASE Father      DIABETES Father      Hypertension Father      Depression Father      C.A.D. Father      Hypertension Mother      Arthritis Mother      HEART DISEASE Mother      long qt syndrome     Thyroid Disease Mother      C.A.D. Mother      HEART DISEASE Brother 15     MI at 15, 16.      DIABETES Maternal Uncle      Hypertension Maternal Aunt      Hypertension Maternal Uncle      Arthritis Brother      he passed away, had severe arthritis at age 11     Arthritis Maternal Uncle      Eye Disorder Maternal Uncle      cataracts     Neurologic Disorder Sister      migraines     Neurologic Disorder Sister      migraines     Respiratory Son      asthma     CEREBROVASCULAR DISEASE Maternal Uncle      C.A.D. Brother      Family History Negative Other      neg for RA, SLE     Unknown/Adopted No family hx of      unknown neurological issues in her family, mother was adopted     Skin Cancer No family hx of      No known family hx of skin cancer     Social History     Social History     Marital status: Single     Spouse name: N/A     Number of children: 1     Years of education: N/A     Occupational History      None      Social History Main Topics     Smoking status: Former Smoker     Packs/day: 0.20     Years: 1.00     Types: Cigarettes     Quit date: 2016     Smokeless tobacco: Never Used     Alcohol use No     Drug use: No     Sexual  "activity: Not Currently     Other Topics Concern     Parent/Sibling W/ Cabg, Mi Or Angioplasty Before 65f 55m? Yes     Social History Narrative    Balanced Diet - sometimes    Osteoporosis Prevention Measures - Dairy servings per day: 2 servings weekly    Regular Exercise -  Yes Describe walking 4 times a week    Dental Exam - NO    Seatbelts used - Yes    Self Breast Exam - Yes    Abuse: Current or Past (Physical, Sexual or Emotional)- No    Do you have any concerns about STD's -  No    Do you feel safe in your environment - No    Guns stored in the home - No    Sunscreen used - Yes    Lipids -  YES - Date: 053102    Glucose -  YES - Date: 012804    Eye Exam - YES - Date: one year ago    Colon Cancer Screening - No    Hemoccults - NO    Pap Test -  YES - Date: 070904, remote history of LEEP    Mammography - YES - Date: last spring, would like to discuss, needs a referral to [facility] [place]    DEXA - NO    Immunizations reviewed and up to date - Yes, last td given in 1997 or 1998       ROS: ten pointt ROS completed, o/w neg    /74  Pulse 68  Ht 1.6 m (5' 3\")  Wt 73.2 kg (161 lb 6.4 oz)  BMI 28.59 kg/m2  Exam:  Constitutional: healthy, alert and no distress  Head: Normocephalic. No masses, lesions, tenderness or abnormalities  Neck: Neck supple. No adenopathy. Thyroid symmetric, normal size,, Carotids without bruits.  ENT: ENT exam normal, no neck nodes or sinus tenderness  Cardiovascular: negative, PMI normal. No lifts, heaves, or thrills. RRR. No murmurs, clicks gallops or rub  Respiratory: negative, Percussion normal. Good diaphragmatic excursion. Lungs clear  Gastrointestinal: Abdomen soft, non-tender. BS normal. No masses, organomegaly  : Deferred  Musculoskeletal: extremities normal- no gross deformities noted, gait normal and normal muscle tone  Skin: no suspicious lesions or rashes  Neurologic: Gait normal. Reflexes normal and symmetric. Sensation grossly WNL.  Psychiatric: mentation " appears normal and affect normal/bright  Hematologic/Lymphatic/Immunologic: Normal cervical lymph nodes    A/P  July 31 normal stress test, in [facility]  Labs today: include cbc, cmet in [facility], rvwd, stable renal fcn from July 25  She's stopped Plavix, asa already, will be off each for at least a week before surgery  Metformin: will skip dose before surgery  Lantus: uses in am, will just use half dose (20 u) am of surgery, instead of usual dose (40 u)  Cleared for surgery on a medical basis    She'll see MTM for DM and for polypharmacy    Will recheck BMP next visit    See me three mo    Kash Solano MD

## 2017-11-16 ENCOUNTER — TELEPHONE (OUTPATIENT)
Dept: PHARMACY | Facility: OTHER | Age: 52
End: 2017-11-16

## 2017-11-16 LAB — DEPRECATED CALCIDIOL+CALCIFEROL SERPL-MC: 41 UG/L (ref 20–75)

## 2017-11-16 NOTE — TELEPHONE ENCOUNTER
MTM referral from: [facility] visit (referral by provider)    MTM referral outreach attempt #1 on November 16, 2017 at 2:22 PM      Outcome: Left Message    Eda Gore MTM Coordinator

## 2017-11-16 NOTE — H&P (VIEW-ONLY)
Surgeon (please enter first and last name): Dr Myron Cyr  Fax number for Preop Evaluation: [phone]  Location of Surgery: [facility]  Date of Surgery: 11/17/17  Procedure: repair ptosis  History of reaction to anesthesia? Nausea and vomiting    Here for preop.  Right eyelid raising procedure, droops enough impairs vision, for months.     No personal or FH easy bleeding or anesthesia reaction except she gets n/v w/ anesthesia        Past Medical History:   Diagnosis Date     Abnormal glandular Papanicolaou smear of cervix 1992     Allergic rhinitis     Allergy, airborne subst     Arthritis      ASCVD (arteriosclerotic cardiovascular disease)      Chronic pain      Chronic pancreatitis (H)     idiopathic, Tx: PPI, antioxidants, pancreatic enzymes     Common migraine      Coronary artery disease      Costochondritis      Costochondritis      Difficulty in walking(719.7)      Dyspnea on exertion      Ectasia, mammary duct     [facility], persistent n[facility] discharge     Elevated fasting glucose      Gastro-oesophageal reflux disease      Hernia      History of angina      Hyperlipidemia LDL goal < 100      Hypertension goal BP (blood pressure) < 140/90     Essential hypertension     Iron deficiency anemia      Ischemic cardiomyopathy      Menorrhagia      Migraine headaches      Mild persistent asthma      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 Matias, neg w/u, Rx'd w/prednisone     Ventral hernia, unspecified, without mention of obstruction or gangrene 2012     Past Surgical History:   Procedure Laterality Date     C ECHO HEART XTHORACIC,COMPLETE, W/O DOPPLER  04    [facility] WNL, EF 60%     C/SECTION, LOW TRANSVERSE           CARDIAC SURGERY      cardiac stent- recent in 16; 4 other stents     DILATION AND CURETTAGE N/A 2016    Procedure: DILATION AND CURETTAGE;  Surgeon: Nahed Butler MD;  [facility]     HC UGI ENDOSCOPY W EUS  08    Dr. Pastrana, possible chronic pancreatitis, fatty liver     HERNIA REPAIR  2012    [place] [facility] [place]     INSERT INTRAUTERINE DEVICE N/A 2016    Procedure: INSERT INTRAUTERINE DEVICE;  Surgeon: Nahed Butler MD;  [facility]     INT UTERINE FIBRIOD EMBOLIZATION  10/29/2014     LAPAROSCOPIC CHOLECYSTECTOMY  08    Dr. Arnol GRUBBS TX, CERVICAL      s/p LEEP     ORBITOTOMY Right 3/15/2016    Procedure: ORBITOTOMY;  Surgeon: Myron Cyr MD;  Location: [facility]     ORBITOTOMY Right 2017    Procedure: ORBITOTOMY;  RIGHT ORBITOTOMY AND BIOPSY;  Surgeon: Charis Holbrook MD;  Location: [facility]     UPPER GI ENDOSCOPY  10/21/08    mild gastritis, Dr. Hidalgo     Current Outpatient Prescriptions   Medication     ketoconazole (NIZORAL) 2 % cream     metroNIDAZOLE (METROCREAM) 0.75 % cream     folic acid (FOLVITE) 1 MG tablet     azelastine (ASTELIN) 0.1 % spray     SYMBICORT 80-4.5 MCG/ACT Inhaler     pravastatin (PRAVACHOL) 40 MG tablet     cyclobenzaprine (FLEXERIL) 10 MG tablet     hydroxychloroquine (PLAQUENIL) 200 MG tablet     spironolactone (ALDACTONE) 25 MG tablet     BASAGLAR 100 UNIT/ML injection     Metoprolol Succinate (TOPROL XL PO)     HYDROcodone-acetaminophen (NORCO) 5-325 MG per tablet     bacitracin ophthalmic ointment     COMPRESSION STOCKINGS      COMPRESSION STOCKINGS     hydrALAZINE (APRESOLINE) 25 MG tablet     insulin pen needle (BD MARCK U/F) 32G X 4 MM     insulin glargine (BASAGLAR KWIKPEN) 100 UNIT/ML injection     ranitidine (ZANTAC) 150 MG tablet     lidocaine (XYLOCAINE) 5 % ointment     traMADol (ULTRAM) 50 MG tablet     ferrous gluconate (FERGON) 324 (38 FE) MG tablet     ASPIRIN LOW DOSE 81 MG EC tablet     blood glucose monitoring (ONE TOUCH DELICA) lancets     vitamin D (ERGOCALCIFEROL) 34851 UNIT capsule     clopidogrel (PLAVIX) 75 MG tablet     lisinopril-hydrochlorothiazide (PRINZIDE/ZESTORETIC) 20-25 MG per tablet     blood glucose monitoring (NO BRAND SPECIFIED) meter device kit     blood glucose monitoring (NO BRAND SPECIFIED) test strip     diphenhydrAMINE (BENADRYL) 25 MG capsule     EPIPEN 2-RIKY 0.3 MG/0.3ML injection     AEROSPAN 80 MCG/ACT AERS     Magnesium Oxide -Mg Supplement 250 MG TABS     nystatin (MYCOSTATIN) 595963 UNITS TABS tablet     albuterol (2.5 MG/3ML) 0.083% neb solution     dicyclomine (BENTYL) 20 MG tablet     sucralfate (CARAFATE) 1 GM tablet     fluocinolone (SYNALAR) 0.01 % solution     mometasone (NASONEX) 50 MCG/ACT spray     Ondansetron HCl (ZOFRAN PO)     metFORMIN (GLUCOPHAGE-XR) 500 MG 24 hr tablet     montelukast (SINGULAIR) 10 MG tablet     acetaminophen (TYLENOL) 325 MG tablet     metroNIDAZOLE (NORITATE) 1 % cream     desonide (DESOWEN) 0.05 % cream     ketoconazole (NIZORAL) 2 % shampoo     fluocinonide (LIDEX) 0.05 % external solution     nitroglycerin (NITROSTAT) 0.4 MG SL tablet     albuterol (PROAIR HFA, PROVENTIL HFA, VENTOLIN HFA) 108 (90 BASE) MCG/ACT inhaler     calcium carbonate (TUMS) 500 MG chewable tablet     fexofenadine (ALLEGRA) 180 MG tablet     olopatadine (PATANOL) 0.1 % ophthalmic solution     No current facility-administered medications for this visit.      Allergies   Allergen Reactions     Amoxicillin-Pot Clavulanate      Augmentin      Unknown possible hives and edema      Azithromycin      Diatrizoate Other (See Comments)     Pt wants this listed because she is allergic to shellfish      Imitrex [Sumatriptan]      Severe face/neck/chest tightness and flushing side effects      Penicillins Hives     Unknown      Pork Allergy      Stomach pain, cramping, diarrhea, itching, nausea and headaches     Shellfish Allergy Hives and Swelling     Sulfa Drugs Hives and Swelling     Zithromax [Azithromycin Dihydrate] Swelling     Unknown      Family History   Problem Relation Age of Onset     HEART DISEASE Father 50     heart attack     CEREBROVASCULAR DISEASE Father      DIABETES Father      Hypertension Father      Depression Father      C.A.D. Father      Hypertension Mother      Arthritis Mother      HEART DISEASE Mother      long qt syndrome     Thyroid Disease Mother      C.A.D. Mother      HEART DISEASE Brother 15     MI at 15, 16.      DIABETES Maternal Uncle      Hypertension Maternal Aunt      Hypertension Maternal Uncle      Arthritis Brother      he passed away, had severe arthritis at age 11     Arthritis Maternal Uncle      Eye Disorder Maternal Uncle      cataracts     Neurologic Disorder Sister      migraines     Neurologic Disorder Sister      migraines     Respiratory Son      asthma     CEREBROVASCULAR DISEASE Maternal Uncle      C.A.D. Brother      Family History Negative Other      neg for RA, SLE     Unknown/Adopted No family hx of      unknown neurological issues in her family, mother was adopted     Skin Cancer No family hx of      No known family hx of skin cancer     Social History     Social History     Marital status: Single     Spouse name: N/A     Number of children: 1     Years of education: N/A     Occupational History      None      Social History Main Topics     Smoking status: Former Smoker     Packs/day: 0.20     Years: 1.00     Types: Cigarettes     Quit date: 2016     Smokeless tobacco: Never Used     Alcohol use No     Drug use: No     Sexual  "activity: Not Currently     Other Topics Concern     Parent/Sibling W/ Cabg, Mi Or Angioplasty Before 65f 55m? Yes     Social History Narrative    Balanced Diet - sometimes    Osteoporosis Prevention Measures - Dairy servings per day: 2 servings weekly    Regular Exercise -  Yes Describe walking 4 times a week    Dental Exam - NO    Seatbelts used - Yes    Self Breast Exam - Yes    Abuse: Current or Past (Physical, Sexual or Emotional)- No    Do you have any concerns about STD's -  No    Do you feel safe in your environment - No    Guns stored in the home - No    Sunscreen used - Yes    Lipids -  YES - Date: 053102    Glucose -  YES - Date: 012804    Eye Exam - YES - Date: one year ago    Colon Cancer Screening - No    Hemoccults - NO    Pap Test -  YES - Date: 070904, remote history of LEEP    Mammography - YES - Date: last spring, would like to discuss, needs a referral to [facility] [place]    DEXA - NO    Immunizations reviewed and up to date - Yes, last td given in 1997 or 1998       ROS: ten pointt ROS completed, o/w neg    /74  Pulse 68  Ht 1.6 m (5' 3\")  Wt 73.2 kg (161 lb 6.4 oz)  BMI 28.59 kg/m2  Exam:  Constitutional: healthy, alert and no distress  Head: Normocephalic. No masses, lesions, tenderness or abnormalities  Neck: Neck supple. No adenopathy. Thyroid symmetric, normal size,, Carotids without bruits.  ENT: ENT exam normal, no neck nodes or sinus tenderness  Cardiovascular: negative, PMI normal. No lifts, heaves, or thrills. RRR. No murmurs, clicks gallops or rub  Respiratory: negative, Percussion normal. Good diaphragmatic excursion. Lungs clear  Gastrointestinal: Abdomen soft, non-tender. BS normal. No masses, organomegaly  : Deferred  Musculoskeletal: extremities normal- no gross deformities noted, gait normal and normal muscle tone  Skin: no suspicious lesions or rashes  Neurologic: Gait normal. Reflexes normal and symmetric. Sensation grossly WNL.  Psychiatric: mentation " appears normal and affect normal/bright  Hematologic/Lymphatic/Immunologic: Normal cervical lymph nodes    A/P  July 31 normal stress test, in EPIC  Labs today: include cbc, cmet in EPIC, rvwd, stable renal fcn from July 25  She's stopped Plavix, asa already, will be off each for at least a week before surgery  Metformin: will skip dose before surgery  Lantus: uses in am, will just use half dose (20 u) am of surgery, instead of usual dose (40 u)  Cleared for surgery on a medical basis    She'll see MTM for DM and for polypharmacy    Will recheck BMP next visit    See me three mo    Kash Solano MD

## 2017-11-17 ENCOUNTER — SURGERY (OUTPATIENT)
Age: 52
End: 2017-11-17

## 2017-11-17 ENCOUNTER — ANESTHESIA (OUTPATIENT)
Dept: SURGERY | Facility: CLINIC | Age: 52
End: 2017-11-17
Payer: COMMERCIAL

## 2017-11-17 ENCOUNTER — ANESTHESIA EVENT (OUTPATIENT)
Dept: SURGERY | Facility: CLINIC | Age: 52
End: 2017-11-17
Payer: COMMERCIAL

## 2017-11-17 ENCOUNTER — HOSPITAL ENCOUNTER (OUTPATIENT)
Facility: CLINIC | Age: 52
Discharge: HOME | End: 2017-11-17
Attending: OPHTHALMOLOGY | Admitting: OPHTHALMOLOGY
Payer: COMMERCIAL

## 2017-11-17 VITALS
DIASTOLIC BLOOD PRESSURE: 65 MMHG | RESPIRATION RATE: 16 BRPM | SYSTOLIC BLOOD PRESSURE: 103 MMHG | OXYGEN SATURATION: 98 %

## 2017-11-17 LAB
GLUCOSE BLDC GLUCOMTR-MCNC: 148 MG/DL (ref 70–99)
GLUCOSE BLDC GLUCOMTR-MCNC: 156 MG/DL (ref 70–99)

## 2017-11-17 PROCEDURE — 25000128 H RX IP 250 OP 636: Performed by: NURSE ANESTHETIST, CERTIFIED REGISTERED

## 2017-11-17 PROCEDURE — 25000125 ZZHC RX 250: Performed by: OPHTHALMOLOGY

## 2017-11-17 PROCEDURE — 27210794 ZZH OR GENERAL SUPPLY STERILE: Performed by: OPHTHALMOLOGY

## 2017-11-17 PROCEDURE — 40000170 ZZH STATISTIC PRE-PROCEDURE ASSESSMENT II: Performed by: OPHTHALMOLOGY

## 2017-11-17 PROCEDURE — 71000028 ZZH EYE RECOVERY PHASE 2 EACH 15 MINS: Performed by: OPHTHALMOLOGY

## 2017-11-17 PROCEDURE — 25000128 H RX IP 250 OP 636: Performed by: ANESTHESIOLOGY

## 2017-11-17 PROCEDURE — 37000008 ZZH ANESTHESIA TECHNICAL FEE, 1ST 30 MIN: Performed by: OPHTHALMOLOGY

## 2017-11-17 PROCEDURE — 37000009 ZZH ANESTHESIA TECHNICAL FEE, EACH ADDTL 15 MIN: Performed by: OPHTHALMOLOGY

## 2017-11-17 PROCEDURE — 82962 GLUCOSE BLOOD TEST: CPT

## 2017-11-17 PROCEDURE — 36000102 ZZH EYE SURGERY LEVEL 3 EA 15 ADDTL MIN: Performed by: OPHTHALMOLOGY

## 2017-11-17 PROCEDURE — 36000101 ZZH EYE SURGERY LEVEL 3 1ST 30 MIN: Performed by: OPHTHALMOLOGY

## 2017-11-17 RX ORDER — SODIUM CHLORIDE, SODIUM LACTATE, POTASSIUM CHLORIDE, CALCIUM CHLORIDE 600; 310; 30; 20 MG/100ML; MG/100ML; MG/100ML; MG/100ML
INJECTION, SOLUTION INTRAVENOUS CONTINUOUS
Status: DISCONTINUED | OUTPATIENT
Start: 2017-11-17 | End: 2017-11-17 | Stop reason: HOSPADM

## 2017-11-17 RX ORDER — LIDOCAINE HCL/EPINEPHRINE/PF 2%-1:200K
VIAL (ML) INJECTION PRN
Status: DISCONTINUED | OUTPATIENT
Start: 2017-11-17 | End: 2017-11-17 | Stop reason: HOSPADM

## 2017-11-17 RX ORDER — PROPOFOL 10 MG/ML
INJECTION, EMULSION INTRAVENOUS PRN
Status: DISCONTINUED | OUTPATIENT
Start: 2017-11-17 | End: 2017-11-17

## 2017-11-17 RX ORDER — ONDANSETRON 2 MG/ML
INJECTION INTRAMUSCULAR; INTRAVENOUS PRN
Status: DISCONTINUED | OUTPATIENT
Start: 2017-11-17 | End: 2017-11-17

## 2017-11-17 RX ORDER — FENTANYL CITRATE 50 UG/ML
INJECTION, SOLUTION INTRAMUSCULAR; INTRAVENOUS PRN
Status: DISCONTINUED | OUTPATIENT
Start: 2017-11-17 | End: 2017-11-17

## 2017-11-17 RX ORDER — BACITRACIN 500 [USP'U]/G
OINTMENT OPHTHALMIC PRN
Status: DISCONTINUED | OUTPATIENT
Start: 2017-11-17 | End: 2017-11-17 | Stop reason: HOSPADM

## 2017-11-17 RX ADMIN — MIDAZOLAM HYDROCHLORIDE 2 MG: 1 INJECTION, SOLUTION INTRAMUSCULAR; INTRAVENOUS at 13:46

## 2017-11-17 RX ADMIN — PHENYLEPHRINE HYDROCHLORIDE 50 MCG: 10 INJECTION INTRAVENOUS at 13:55

## 2017-11-17 RX ADMIN — PROPOFOL 20 MG: 10 INJECTION, EMULSION INTRAVENOUS at 13:51

## 2017-11-17 RX ADMIN — Medication 12 MCG: at 13:49

## 2017-11-17 RX ADMIN — ONDANSETRON 4 MG: 2 INJECTION INTRAMUSCULAR; INTRAVENOUS at 13:46

## 2017-11-17 RX ADMIN — PROPOFOL 30 MG: 10 INJECTION, EMULSION INTRAVENOUS at 13:49

## 2017-11-17 RX ADMIN — SODIUM CHLORIDE, POTASSIUM CHLORIDE, SODIUM LACTATE AND CALCIUM CHLORIDE: 600; 310; 30; 20 INJECTION, SOLUTION INTRAVENOUS at 13:37

## 2017-11-17 RX ADMIN — PHENYLEPHRINE HYDROCHLORIDE 50 MCG: 10 INJECTION INTRAVENOUS at 14:10

## 2017-11-17 RX ADMIN — BACITRACIN 1 TUBE: 500 OINTMENT OPHTHALMIC at 14:02

## 2017-11-17 RX ADMIN — FENTANYL CITRATE 100 MCG: 50 INJECTION, SOLUTION INTRAMUSCULAR; INTRAVENOUS at 13:46

## 2017-11-17 RX ADMIN — PHENYLEPHRINE HYDROCHLORIDE 100 MCG: 10 INJECTION INTRAVENOUS at 14:01

## 2017-11-17 RX ADMIN — LIDOCAINE HYDROCHLORIDE,EPINEPHRINE BITARTRATE 7 ML: 20; .005 INJECTION, SOLUTION EPIDURAL; INFILTRATION; INTRACAUDAL; PERINEURAL at 14:02

## 2017-11-17 RX ADMIN — PHENYLEPHRINE HYDROCHLORIDE 50 MCG: 10 INJECTION INTRAVENOUS at 14:13

## 2017-11-17 ASSESSMENT — ENCOUNTER SYMPTOMS: SEIZURES: 0

## 2017-11-17 NOTE — ANESTHESIA CARE TRANSFER NOTE
Patient: Janine Cornell    Procedure(s):  RIGHT UPPER LID PTOSIS REPAIR - Wound Class: I-Clean    Diagnosis: RIGHT UPPER LID PTOSIS  Diagnosis Additional Information: No value filed.    Anesthesia Type:   MAC     Note:  Airway :Room Air  Patient transferred to:PACU        Vitals: (Last set prior to Anesthesia Care Transfer)    CRNA VITALS  11/17/2017 1348 - 11/17/2017 1423      11/17/2017             Pulse: 63    SpO2: 99 %    Resp Rate (set): 10                Electronically Signed By: KAYLYNN Tipton CRNA  November 17, 2017  2:23 PM

## 2017-11-17 NOTE — ANESTHESIA PREPROCEDURE EVALUATION
Anesthesia Evaluation     . Pt has had prior anesthetic.     History of anesthetic complications   - PONV        ROS/MED HX    ENT/Pulmonary:     (+)Mild Persistent asthma Treatment: Inhaler daily and Inhaler prn,  , . .   (-) sleep apnea   Neurologic:      (-) seizures and CVA   Cardiovascular:     (+) Dyslipidemia, hypertension--CAD, angina--stent,. : . . ROBERTS, . :. .       METS/Exercise Tolerance:     Hematologic:         Musculoskeletal:   (+) arthritis, , , -       GI/Hepatic:     (+) GERD Asymptomatic on medication,      (-) liver disease   Renal/Genitourinary:      (-) renal disease   Endo:     (+) type II DM Obesity, .      Psychiatric:         Infectious Disease:         Malignancy:         Other:                     Physical Exam  Normal systems: cardiovascular and pulmonary    Airway   Mallampati: II  TM distance: >3 FB  Neck ROM: full    Dental   (+) missing    Cardiovascular       Pulmonary                     Anesthesia Plan      History & Physical Review  History and physical reviewed and following examination; no interval change.    ASA Status:  3 .    NPO Status:  > 8 hours    Plan for MAC Reason for MAC:  Procedure to face, neck, head or breast  PONV prophylaxis:  Ondansetron (or other 5HT-3) and Dexamethasone or Solumedrol       Postoperative Care  Postoperative pain management:  IV analgesics.      Consents  Anesthetic plan, risks, benefits and alternatives discussed with:  Patient..        Procedure: Procedure(s):  REPAIR PTOSIS  Preop diagnosis: RIGHT UPPER LID PTOSIS    Allergies   Allergen Reactions     Amoxicillin-Pot Clavulanate      Augmentin      Unknown possible hives and edema     Azithromycin      Diatrizoate Other (See Comments)     Pt wants this listed because she is allergic to shellfish      Imitrex [Sumatriptan]      Severe face/neck/chest tightness and flushing side effects      Penicillins Hives     Unknown      Pork Allergy      Stomach pain, cramping, diarrhea, itching,  nausea and headaches     Shellfish Allergy Hives and Swelling     Sulfa Drugs Hives and Swelling     Zithromax [Azithromycin Dihydrate] Swelling     Unknown      Past Medical History:   Diagnosis Date     Abnormal glandular Papanicolaou smear of cervix 1992     Allergic rhinitis     Allergy, airborne subst     Arthritis      ASCVD (arteriosclerotic cardiovascular disease)      Chronic pain      Chronic pancreatitis (H)     idiopathic, Tx: PPI, antioxidants, pancreatic enzymes     Common migraine      Coronary artery disease      Costochondritis      Costochondritis      Difficulty in walking(719.7)      Dyspnea on exertion      Ectasia, mammary duct     [facility], persistent nipple discharge     Elevated fasting glucose      Gastro-oesophageal reflux disease      Hernia      History of angina      Hyperlipidemia LDL goal < 100      Hypertension goal BP (blood pressure) < 140/90     Essential hypertension     Iron deficiency anemia      Ischemic cardiomyopathy      Menorrhagia      Migraine headaches      Mild persistent asthma      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 Matias, neg w/u, Rx'd w/prednisone     Ventral hernia, unspecified, without mention of obstruction or gangrene 7/2012     Past Surgical History:   Procedure Laterality Date     C ECHO  HEART XTHORACIC,COMPLETE, W/O DOPPLER  04    [facility] WNL, EF 60%     C/SECTION, LOW TRANSVERSE           CARDIAC SURGERY      cardiac stent- recent in 16; 4 other stents     DILATION AND CURETTAGE N/A 2016    Procedure: DILATION AND CURETTAGE;  Surgeon: Nahed Butler MD;  [facility]     HC UGI ENDOSCOPY W EUS  08    Dr. Pastrana, possible chronic pancreatitis, fatty liver     [place]ERNIA REPAIR  2012    [place] [facility] [place]     INSERT INTRAUTERINE DEVICE N/A 2016    Procedure: INSERT INTRAUTERINE DEVICE;  Surgeon: Nahed Butler MD;  [facility]     INT UTERINE FIBRIOD EMBOLIZATION  10/29/2014     LAPAROSCOPIC CHOLECYSTECTOMY  08    Dr. Arnol GRUBBS TX, CERVICAL      s/p LEEP     ORBITOTOMY Right 3/15/2016    Procedure: ORBITOTOMY;  Surgeon: Myron Cyr MD;  Location: [facility]     ORBITOTOMY Right 2017    Procedure: ORBITOTOMY;  RIGHT ORBITOTOMY AND BIOPSY;  Surgeon: Charis Holbrook MD;  Location: [facility]     UPPER GI ENDOSCOPY  10/21/08    mild gastritis, Dr. Hidalgo     Prior to Admission medications    Medication Sig Start Date End Date Taking? Authorizing Provider   pravastatin (PRAVACHOL) 40 MG tablet Take 1 tablet (40 mg) by mouth daily 10/30/17  Yes Milan Peace MD   spironolactone (ALDACTONE) 25 MG tablet Take 2 tablets (50 mg) by mouth daily 10/24/17  Yes Milan Peace MD   BASAGLAR 100 UNIT/ML injection Inject 40 Units Subcutaneous daily  Patient taking differently: Inject 40 Units Subcutaneous daily Took 20 uniots this am 10/23/17  Yes Kash Solano MD   Metoprolol Succinate (TOPROL XL PO) Take 100 mg by mouth daily   Yes Reported, Patient   ranitidine (ZANTAC) 150 MG tablet Take 1 tablet (150 mg) by mouth 2 times daily 17  Yes Kash Solano MD   traMADol (ULTRAM) 50 MG tablet Take 1 tablet (50 mg) by mouth every 8 hours as needed for moderate pain 3/31/17  Yes Majo Mckinnon MD   ferrous gluconate (FERGON) 324  (38 FE) MG tablet Take 1 tablet (324 mg) by mouth 2 times daily 2/1/17  Yes Kash Solano MD   vitamin D (ERGOCALCIFEROL) 37124 UNIT capsule Take 1 capsule (50,000 Units) by mouth every 7 days Need a Vitamin D level in 2 months.  Patient taking differently: Take 50,000 Units by mouth every 7 days Need a Vitamin D level in 2 months. 1/25/17  Yes Kash Solano MD   clopidogrel (PLAVIX) 75 MG tablet Take 1 tablet (75 mg) by mouth daily 1/25/17  Yes Milan Peace MD   lisinopril-hydrochlorothiazide (PRINZIDE/ZESTORETIC) 20-25 MG per tablet TAKE 1 TABLET BY MOUTH DAILY 1/24/17  Yes Milan Peace MD   diphenhydrAMINE (BENADRYL) 25 MG capsule TK 1 TO 2 CS PO QHS 10/21/16  Yes Reported, Patient   dicyclomine (BENTYL) 20 MG tablet Take 1 tablet (20 mg) by mouth 4 times daily as needed 12/29/16  Yes Kash Solano MD   sucralfate (CARAFATE) 1 GM tablet Take 1 tablet (1 g) by mouth 4 times daily as needed 12/29/16  Yes Kash Solano MD   Ondansetron HCl (ZOFRAN PO)    Yes Reported, Patient   metFORMIN (GLUCOPHAGE-XR) 500 MG 24 hr tablet Take 2 tablets (1,000 mg) by mouth daily (with dinner) 8/25/16  Yes Kash Solano MD   montelukast (SINGULAIR) 10 MG tablet Take 1 tablet (10 mg) by mouth At Bedtime 8/25/16  Yes Kash Solano MD   calcium carbonate (TUMS) 500 MG chewable tablet Take 3-4 chew tab by mouth daily as needed.   Yes Reported, Patient   fexofenadine (ALLEGRA) 180 MG tablet Take 1 tablet by mouth daily as needed. 9/7/12  Yes Evelin Ling APRN CNP   olopatadine (PATANOL) 0.1 % ophthalmic solution Place 1 drop into both eyes 2 times daily as needed for allergies. 8/17/12  Yes Evelin Bo MD   ketoconazole (NIZORAL) 2 % cream  10/20/17   Reported, Patient   metroNIDAZOLE (METROCREAM) 0.75 % cream CECI EXT TO FACE BID 10/19/17   Reported, Patient   folic acid (FOLVITE) 1 MG tablet TK 1 T PO D 10/23/17   Reported, Patient   azelastine (ASTELIN) 0.1 % spray U 2 SPRAYS  IEN BID 6/1/17   Reported, Patient   SYMBICORT 80-4.5 MCG/ACT Inhaler INHALE 2 PUFFS PO BID RINSE AND EXPECTORATE AFTER 10/27/17   Reported, Patient   cyclobenzaprine (FLEXERIL) 10 MG tablet Take 1 tablet (10 mg) by mouth 2 times daily as needed for muscle spasms 10/29/17   Majo Mckinnon MD   hydroxychloroquine (PLAQUENIL) 200 MG tablet Take 2 tablets (400 mg) by mouth daily 10/26/17   Majo Mckinnon MD   bacitracin ophthalmic ointment Apply to incision line three times daily. 8/4/17   Yesi Valdez MD   COMPRESSION STOCKINGS 2 each daily 6/28/17   Milan Peace MD   COMPRESSION STOCKINGS 2 each daily Apply one 10-15 mmHg compression stocking to each lower extgmierty during the day and remove before bedtime. 6/28/17   Milan Peace MD   insulin pen needle (BD MARCK U/F) 32G X 4 MM USE DAILY OR AS DIRECTED 6/1/17   Kash Solano MD   insulin glargine (BASAGLAR KWIKPEN) 100 UNIT/ML injection Inject 40 Units Subcutaneous daily 6/1/17   Kash Solano MD   lidocaine (XYLOCAINE) 5 % ointment Apply 1 g topically 2 times daily as needed for moderate pain 4/5/17   Kash Solano MD   ASPIRIN LOW DOSE 81 MG EC tablet TAKE 1 TABLET BY MOUTH EVERY DAY 2/1/17   Milan Peace MD   blood glucose monitoring (ONE TOUCH DELICA) lancets Use to test blood sugars 4 times daily or as directed. 2/1/17   Kash Solano MD   blood glucose monitoring (NO BRAND SPECIFIED) meter device kit Use to test blood sugar 4 X times daily or as directed. 1/24/17   Kash Solano MD   blood glucose monitoring (NO BRAND SPECIFIED) test strip 1 strip by In Vitro route 4 times daily Test as directed. Please dispense three months and three months of refills. Thank you. 1/24/17   Kash Solano MD   EPIPEN 2-RIKY 0.3 MG/0.3ML injection INJECT 0.3 MG INTO THE MUSCLE PRF ANAPHYALAXIS 9/22/16   Reported, Patient   AEROSPAN 80 MCG/ACT AERS INHALE 2 PUFFS PO BID.  RINSE MOUTH AFTERWARDS 11/11/16   Reported,  Patient   Magnesium Oxide -Mg Supplement 250 MG TABS TK 1 T PO BID. REDUCE IF STOOLS LOOSEN 10/22/16   Reported, Patient   nystatin (MYCOSTATIN) 815395 UNITS TABS tablet TK 2 TS PO BID 11/11/16   Reported, Patient   albuterol (2.5 MG/3ML) 0.083% neb solution INL 1 VIAL VIA NEBULIZATION Q 4 TO 6 HOURS PRN 10/21/16   Reported, Patient   fluocinolone (SYNALAR) 0.01 % solution Apply topically daily as needed    Reported, Patient   mometasone (NASONEX) 50 MCG/ACT spray Spray 2 sprays into both nostrils daily    Reported, Patient   acetaminophen (TYLENOL) 325 MG tablet Take 1-2 tablets (325-650 mg) by mouth every 6 hours as needed for pain (headache) 5/25/16   Kash Solano MD   metroNIDAZOLE (NORITATE) 1 % cream Apply topically daily    Reported, Patient   desonide (DESOWEN) 0.05 % cream Apply topically 2 times daily    Reported, Patient   ketoconazole (NIZORAL) 2 % shampoo Apply topically daily as needed    Reported, Patient   fluocinonide (LIDEX) 0.05 % external solution Apply topically 2 times daily To areas of itch on the scalp as needed. 11/17/15   Jolene Duenas MD   nitroglycerin (NITROSTAT) 0.4 MG SL tablet Place 1 tablet (0.4 mg) under the tongue every 5 minutes as needed for chest pain 1/14/15   Rafa Chapman MD   albuterol (PROAIR HFA, PROVENTIL HFA, VENTOLIN HFA) 108 (90 BASE) MCG/ACT inhaler Inhale 2 puffs into the lungs every 6 hours as needed for shortness of breath / dyspnea or wheezing 10/6/14   Georgia Xavier MD     Current Facility-Administered Medications Ordered in Epic   Medication Dose Route Frequency Last Rate Last Dose     lidocaine 1 % 1 mL  1 mL Other Q1H PRN         lactated ringers infusion   Intravenous Continuous 25 mL/hr at 11/17/17 1337       No current [facility]-ordered outpatient prescriptions on file.     Wt Readings from Last 1 Encounters:   11/15/17 73.2 kg (161 lb 6.4 oz)     Temp Readings from Last 1 Encounters:   08/04/17 35.1  C (95.2  F) (Temporal)     BP  Readings from Last 6 Encounters:   11/15/17 130/74   08/04/17 101/70   07/25/17 108/70   06/28/17 125/81   06/15/17 120/82   05/05/17 109/72     Pulse Readings from Last 4 Encounters:   11/15/17 68   08/04/17 73   07/25/17 80   06/28/17 62     Resp Readings from Last 1 Encounters:   08/04/17 12     SpO2 Readings from Last 1 Encounters:   08/04/17 96%     Recent Labs   Lab Test  11/15/17   1359  07/31/17   1546   NA  137  134   POTASSIUM  4.4  4.0   CHLORIDE  104  99   CO2  26  26   ANIONGAP  7  8   GLC  120*  150*   BUN  22  28   CR  1.48*  1.04   KATHY  9.5  9.6     Recent Labs   Lab Test  11/15/17   1359  07/25/17   1724   WBC  9.1  11.0   HGB  12.3  13.9   PLT  358  334     Recent Labs   Lab Test  08/25/16   1713  05/20/15   1543   INR  0.97  0.98      RECENT LABS:   ECG:   ECHO:   CXR:                        .

## 2017-11-17 NOTE — IP AVS SNAPSHOT
[facility]    [address]    [place] [address]    Phone:  [phone]    Fax:  [phone]                                       After Visit Summary   11/17/2017    Janine Cornell    MRN: [id number]           After Visit Summary Signature Page     I have received my discharge instructions, and my questions have been answered. I have discussed any challenges I see with this plan with the nurse or doctor.    ..........................................................................................................................................  Patient/Patient Representative Signature      ..........................................................................................................................................  Patient Representative Print Name and Relationship to Patient    ..................................................               ................................................  Date                                            Time    ..........................................................................................................................................  Reviewed by Signature/Title    ...................................................              ..............................................  Date                                                            Time

## 2017-11-17 NOTE — OP NOTE
Preoperative Diagnosis: Right Upper Eyelid Ptosis  Postoperative Diagnosis: Right Upper Eyelid Ptosis  Operation: Repair Right Upper Eyelid Ptosis    Anesthesia: Local     Complications: None    Indications for Surgery: Ptosis    Procedure: The patient was taken to the operating room and received a local block of 2% lidocaine without epinephrine. The block was administered transcutaneously along the eyelid crease and the planned incision line was outlined with a marking pen. The patient was then prepped and draped in the usual sterile fashion. The incision was then made along the previously marked area. A moderate amount of skin was excised taking care to allow adequate closure and avoid lagophthalmos. [place]ott scissors were then used to develop a plane over the septum. The septum was opened and a small amount of orbital fat was removed. Levator aponeurosis was then disinserted from the tarsus and a plane was developed between the aponeurosis and the underlying tarsus and Siddiqi's muscle. A 5-0 Mersilene suture was then passed through the tarsus in a lamellar fashion and externalized through the levator aponeurosis. This was tied off in a temporary fashion and the patient was asked to sit up and the eyelid height and contour evaluated. This was repeated until a satisfactory height and contour were obtained, and two additional sutures were placed lateral to the initial suture. This resulted in a normal contour and height to the right upper eyelid. Attempted overcorrection of 0.5 mm was obtained. The redundant levator aponeurosis was then excised and the skin was then closed with a running 6-0 fast absorbing suture. The patient left the operating room in stable condition.

## 2017-11-17 NOTE — IP AVS SNAPSHOT
MRN:[id number]                      After Visit Summary   11/17/2017    Janine Cornell    MRN: [id number]           Thank you!     Thank you for choosing [facility] for your care. Our goal is always to provide you with excellent care. Hearing back from our patients is one way we can continue to improve our services. Please take a few minutes to complete the written survey that you may receive in the mail after you visit with us. Thank you!        Patient Information     Date Of Birth          1965        About your hospital stay     You were admitted on:  November 17, 2017 You last received care in the:  [facility]    You were discharged on:  November 17, 2017       Who to Call     For medical emergencies, please call 911.  For non-urgent questions about your medical care, please call your primary care provider or clinic, [phone]  For questions related to your surgery, please call your surgery clinic        Attending Provider     Provider Specialty    Myron Cyr MD Ophthalmology       Primary Care Provider Office Phone # Fax #    Kash Solano [phone]      Your next 10 appointments already scheduled     Dec 20, 2017  3:30 PM CST   Lab with  LAB    Health Lab ([facility])    [address] [place] [address]  1st Floor  [facility] [address] [phone]            Dec 20, 2017  4:00 PM CST   (Arrive by 3:45 PM)   Return Visit with Milan Peace MD   [facility] ([facility])    [address] [place] [address]  3rd Floor  [facility] [id number] [phone]            Feb 14, 2018  2:05 PM CST   (Arrive by 1:50 PM)   Return Visit with Kash Solano MD   [facility] ([facility])    [address] [place] [address]  4th Floor  [facility] [address] [phone]              Further instructions from your care team       [facility]    Eyelid/Orbital Surgery Discharge Instructions    Myron Cyr M.D..     ICE COMPRESSES  Immediately following surgery, you should begin to apply ice compresses.  Apply a cold gel pack or wrap a clean washcloth around a cup of crushed ice in a plastic bag (a bag of frozen peas also works well) and hold the cold compresses directly against the closed eyelid (s).Apply cold pack for a minimum of six times daily for no longer than 15 minutes at a time. Continue cold compresses every day until the bruising and swelling begin to subside.  This can vary for each patient, but three 3 days may be common.    HOT COMPRESSES  After your swelling and bruising have begun to subside, hot compresses should be applied.  Take a clean washcloth and wring it out in hot water (as warm as you can tolerate comfortably).  Hold this warm compress against the closed eyelid(s) at least six times per day for 15 minutes.  This should be continued for about two weeks.    OINTMENT  You may be given some ointment when you leave the hospital.  Apply this ointment to the suture line(s) twice a day, 1/4 inch into the eye at bedtime for 7 days.  Expect some blurring of vision from the ointment.     ACTIVITY  Avoid heavy lifting or vigorous exercise for one week after surgery.  You may resume regular activities as tolerated.  You may shower and wash your hair on the day after surgery; be careful to avoid getting shampoo in your eyes. While your eyes are still swelling, it is recommended you sleep on your back and elevate your head with 2-3 pillows.    MEDICATION  If the doctor has given you some medications to take after surgery, please take these according to the instructions on the bottle.  Pain medications may make you drowsy so do not drive, operate heavy machinery, or use alcohol while taking it.  When you feel that you do not need the prescription pain medication, you may substitute Extra Strength Tylenol for mild pain by also following the  directions on the bottle.    If you were taking Coumadin (warfarin) prior to your surgery, you may resume this medication with your next scheduled dose.    WHAT TO EXPECT  You should expect some slight oozing of blood from the incision site over the first two to three days after surgery.  Swelling and bruising will occur for one to two weeks or longer.  You may also experience itching and tearing during the first several weeks after surgery.  This is part of the normal healing process    QUESTIONS  Please feel free to contact the office, should you have any questions that are not answered above.  The phone number is [phone].  Please call immediately if you are unable to establish vision in the operative eye, you are experiencing heavy bleeding that will not stop with gentle pressure or you have any signs of an infection (greenish/yellow discharge or progressive redness).    [facility]  [facility] [place] [address]  [place], [place] [address]  [phone]      [facility] Discharge  Instructions  Anesthesia ([facility] Center)   Adult Discharge Instructions    For 24 hours after surgery    1. Get plenty of rest.  Make arrangements to have a responsible adult stay with you for at least 6 hours after you leave the hospital.  2. Do not drive or use heavy equipment for 24 hours.    3. Do not drink alcohol for 24 hours.  4. Do not sign legal documents or make important decisions for 24 hours.  5. Avoid strenuous or risky activities. You may feel lightheaded.  If so, sit for a few minutes before standing.  Have someone help you get up.   6. Conscious sedation patients may resume a regular diet..  7. Any questions of medical nature, call your physician.    Pending Results     No orders found from 11/15/2017 to 11/18/2017.            Admission Information     Date & Time Provider Department Dept. Phone    11/17/2017  Myron Cyr MD [facility] [place] [phone]      Your Vitals Were     Blood Pressure Respirations Last Period Pulse Oximetry          103/65 16 11/06/2017 98%        MyChart Information     [facility] gives you secure access to your electronic health record. If you see a primary care provider, you can also send messages to your care team and make appointments. If you have questions, please call your primary care clinic.  If you do not have a primary care provider, please call [phone] and they will assist you.        Care EveryWhere ID     This is your Care EveryWhere ID. This could be used by other organizations to access your [place] medical records  [id number]        Equal Access to Services     MANDA QUINN : Dora June, acacia watters, tracey johnson, gagandeep vincent. So [facility] [phone].    ATENCIÓN: Si habla español, tiene a burch disposición servicios gratuitos de asistencia lingüística. Llame al [phone].    We comply with applicable federal civil rights laws and [place] laws. We do not discriminate on the basis of race, color, national origin, age, disability, sex, sexual orientation, or gender identity.               Review of your medicines      UNREVIEWED medicines. Ask your doctor about these medicines        Dose / Directions    acetaminophen 325 MG tablet   Commonly known as:  TYLENOL   Used for:  Other chronic pain, Headache(784.0)        Dose:  325-650 mg   Take 1-2 tablets (325-650 mg) by mouth every 6 hours as needed for pain (headache)   Quantity:  250 tablet   Refills:  0       AEROSPAN 80 MCG/ACT Aers oral inhaler   Generic drug:  flunisolide HFA        INHALE 2 PUFFS PO BID.  RINSE MOUTH AFTERWARDS   Refills:  4       * albuterol 108 (90 BASE) MCG/ACT Inhaler   Commonly known as:  PROAIR HFA/PROVENTIL HFA/VENTOLIN HFA        Dose:  2 puff   Inhale 2 puffs into the lungs every 6 hours as needed for  shortness of breath / dyspnea or wheezing   Quantity:  3 Inhaler   Refills:  1       * albuterol (2.5 MG/3ML) 0.083% neb solution        INL 1 VIAL VIA NEBULIZATION Q 4 TO 6 HOURS PRN   Refills:  1       ASPIRIN LOW DOSE 81 MG EC tablet   Used for:  Cardiomyopathy, unspecified   Generic drug:  aspirin        TAKE 1 TABLET BY MOUTH EVERY DAY   Quantity:  90 tablet   Refills:  3       azelastine 0.1 % spray   Commonly known as:  ASTELIN        U 2 SPRAYS IEN BID   Refills:  0       bacitracin ophthalmic ointment   Used for:  Postoperative eye state        Apply to incision line three times daily.   Quantity:  3.5 g   Refills:  0       * BASAGLAR 100 UNIT/ML injection   Used for:  Type 2 diabetes mellitus without complication (H)        Dose:  40 Units   Inject 40 Units Subcutaneous daily   Quantity:  18 mL   Refills:  3       * BASAGLAR 100 UNIT/ML injection   Used for:  Type 2 diabetes mellitus without complication (H)        Dose:  40 Units   Inject 40 Units Subcutaneous daily   Quantity:  12 mL   Refills:  2       clopidogrel 75 MG tablet   Commonly known as:  PLAVIX        Dose:  75 mg   Take 1 tablet (75 mg) by mouth daily   Quantity:  90 tablet   Refills:  3       cyclobenzaprine 10 MG tablet   Commonly known as:  FLEXERIL   Used for:  Fibromyalgia        Dose:  10 mg   Take 1 tablet (10 mg) by mouth 2 times daily as needed for muscle spasms   Quantity:  180 tablet   Refills:  0       desonide 0.05 % cream   Commonly known as:  DESOWEN        Apply topically 2 times daily   Refills:  0       dicyclomine 20 MG tablet   Commonly known as:  BENTYL   Used for:  Other irritable bowel syndrome        Dose:  20 mg   Take 1 tablet (20 mg) by mouth 4 times daily as needed   Quantity:  60 tablet   Refills:  5       diphenhydrAMINE 25 MG capsule   Commonly known as:  BENADRYL        TK 1 TO 2 CS PO QHS   Refills:  4       EPIPEN 2-RIKY 0.3 MG/0.3ML injection 2-pack   Generic drug:  EPINEPHrine        INJECT 0.3 MG INTO THE  MUSCLE PRF ANAPHYALAXIS   Refills:  0       ferrous gluconate 324 (38 FE) MG tablet   Commonly known as:  FERGON   Used for:  AILEEN (iron deficiency anemia)        Dose:  1 tablet   Take 1 tablet (324 mg) by mouth 2 times daily   Quantity:  180 tablet   Refills:  1       fexofenadine 180 MG tablet   Commonly known as:  ALLEGRA   Used for:  Chronic rhinitis        Dose:  180 mg   Take 1 tablet by mouth daily as needed.   Quantity:  90 tablet   Refills:  3       fluocinolone 0.01 % solution   Commonly known as:  SYNALAR        Apply topically daily as needed   Refills:  0       fluocinonide 0.05 % solution   Commonly known as:  LIDEX   Used for:  Telogen effluvium        Apply topically 2 times daily To areas of itch on the scalp as needed.   Quantity:  60 mL   Refills:  11       folic acid 1 MG tablet   Commonly known as:  FOLVITE        TK 1 T PO D   Refills:  2       hydroxychloroquine 200 MG tablet   Commonly known as:  PLAQUENIL   Used for:  Inflammatory arthritis        Dose:  400 mg   Take 2 tablets (400 mg) by mouth daily   Quantity:  180 tablet   Refills:  1       * ketoconazole 2 % shampoo   Commonly known as:  NIZORAL        Apply topically daily as needed   Refills:  0       * ketoconazole 2 % cream   Commonly known as:  NIZORAL        Refills:  3       lidocaine 5 % ointment   Commonly known as:  XYLOCAINE   Used for:  Fibromyalgia, Rheumatoid arthritis involving both wrists with positive rheumatoid factor (H)        Dose:  1 inch   Apply 1 g topically 2 times daily as needed for moderate pain   Quantity:  50 g   Refills:  5       lisinopril-hydrochlorothiazide 20-25 MG per tablet   Commonly known as:  PRINZIDE/ZESTORETIC   Used for:  HTN (hypertension)        TAKE 1 TABLET BY MOUTH DAILY   Quantity:  90 tablet   Refills:  3       Magnesium Oxide -Mg Supplement 250 MG Tabs        TK 1 T PO BID. REDUCE IF STOOLS LOOSEN   Refills:  11       metFORMIN 500 MG 24 hr tablet   Commonly known as:  GLUCOPHAGE-XR    Used for:  Type 2 diabetes mellitus not at goal (H)        Dose:  1000 mg   Take 2 tablets (1,000 mg) by mouth daily (with dinner)   Quantity:  60 tablet   Refills:  11       * metroNIDAZOLE 1 % cream   Commonly known as:  NORITATE        Apply topically daily   Refills:  0       * metroNIDAZOLE 0.75 % cream   Commonly known as:  METROCREAM        CECI EXT TO FACE BID   Refills:  2       mometasone 50 MCG/ACT spray   Commonly known as:  NASONEX        Dose:  2 spray   Spray 2 sprays into both nostrils daily   Refills:  0       montelukast 10 MG tablet   Commonly known as:  SINGULAIR   Used for:  Uncomplicated asthma, unspecified asthma severity        Dose:  10 mg   Take 1 tablet (10 mg) by mouth At Bedtime   Quantity:  30 tablet   Refills:  1       nitroGLYcerin 0.4 MG sublingual tablet   Commonly known as:  NITROSTAT   Used for:  NSTEMI (non-ST elevated myocardial infarction) (H)        Dose:  0.4 mg   Place 1 tablet (0.4 mg) under the tongue every 5 minutes as needed for chest pain   Quantity:  25 tablet   Refills:  1       nystatin 425740 UNITS Tabs tablet   Commonly known as:  MYCOSTATIN        TK 2 TS PO BID   Refills:  0       olopatadine 0.1 % ophthalmic solution   Commonly known as:  PATANOL   Used for:  Chronic rhinitis        Dose:  1 drop   Place 1 drop into both eyes 2 times daily as needed for allergies.   Quantity:  5 mL   Refills:  12       pravastatin 40 MG tablet   Commonly known as:  PRAVACHOL   Used for:  CAD (coronary artery disease)        Dose:  40 mg   Take 1 tablet (40 mg) by mouth daily   Quantity:  90 tablet   Refills:  0       ranitidine 150 MG tablet   Commonly known as:  ZANTAC   Used for:  Gastritis        Dose:  150 mg   Take 1 tablet (150 mg) by mouth 2 times daily   Quantity:  60 tablet   Refills:  2       spironolactone 25 MG tablet   Commonly known as:  ALDACTONE   Used for:  Hypertension goal BP (blood pressure) < 140/90        Dose:  50 mg   Take 2 tablets (50 mg) by mouth  daily   Quantity:  60 tablet   Refills:  11       sucralfate 1 GM tablet   Commonly known as:  CARAFATE   Used for:  Abdominal pain, epigastric        Dose:  1 g   Take 1 tablet (1 g) by mouth 4 times daily as needed   Quantity:  120 tablet   Refills:  3       SYMBICORT 80-4.5 MCG/ACT Inhaler   Generic drug:  budesonide-formoterol        INHALE 2 PUFFS PO BID RINSE AND EXPECTORATE AFTER   Refills:  3       TOPROL XL PO        Dose:  100 mg   Take 100 mg by mouth daily   Refills:  0       traMADol 50 MG tablet   Commonly known as:  ULTRAM   Used for:  Undifferentiated connective tissue disease (H)        Dose:  50 mg   Take 1 tablet (50 mg) by mouth every 8 hours as needed for moderate pain   Quantity:  60 tablet   Refills:  3       TUMS 500 MG chewable tablet   Generic drug:  calcium carbonate        Dose:  3-4 chew tab   Take 3-4 chew tab by mouth daily as needed.   Refills:  0       vitamin D 61876 UNIT capsule   Commonly known as:  ERGOCALCIFEROL   Used for:  Vitamin D deficiency        Dose:  45457 Units   Take 1 capsule (50,000 Units) by mouth every 7 days Need a Vitamin D level in 2 months.   Quantity:  8 capsule   Refills:  0       ZOFRAN PO        Refills:  0       * Notice:  This list has 8 medication(s) that are the same as other medications prescribed for you. Read the directions carefully, and ask your doctor or other care provider to review them with you.      CONTINUE these medicines which have NOT CHANGED        Dose / Directions    blood glucose monitoring lancets   Used for:  Type 2 diabetes, HbA1c goal < 7% (H)        Use to test blood sugars 4 times daily or as directed.   Quantity:  4 Box   Refills:  3       blood glucose monitoring meter device kit   Commonly known as:  no brand specified   Used for:  Type 2 diabetes, HbA1c goal < 7% (H)        Use to test blood sugar 4 X times daily or as directed.   Quantity:  1 kit   Refills:  0       blood glucose monitoring test strip   Commonly known as:   no brand specified   Used for:  Type 2 diabetes, HbA1c goal < 7% (H)        Dose:  1 strip   1 strip by In Vitro route 4 times daily Test as directed. Please dispense three months and three months of refills. Thank you.   Quantity:  360 each   Refills:  3       * COMPRESSION STOCKINGS   Used for:  Bilateral lower extremity edema, Venous incompetence        Dose:  2 each   2 each daily   Quantity:  4 each   Refills:  2       * COMPRESSION STOCKINGS   Used for:  Venous incompetence, Bilateral lower extremity edema        Dose:  2 each   2 each daily Apply one 10-15 mmHg compression stocking to each lower extgmierty during the day and remove before bedtime.   Quantity:  4 each   Refills:  2       insulin pen needle 32G X 4 MM   Commonly known as:  BD MARCK U/F   Used for:  Type 2 diabetes, HbA1c goal < 7% (H)        USE DAILY OR AS DIRECTED   Quantity:  300 each   Refills:  3       * Notice:  This list has 2 medication(s) that are the same as other medications prescribed for you. Read the directions carefully, and ask your doctor or other care provider to review them with you.             Protect others around you: Learn how to safely use, store and throw away your medicines at www.disposemymeds.org.             Medication List: This is a list of all your medications and when to take them. Check marks below indicate your daily home schedule. Keep this list as a reference.      Medications           Morning Afternoon Evening Bedtime As Needed    acetaminophen 325 MG tablet   Commonly known as:  TYLENOL   Take 1-2 tablets (325-650 mg) by mouth every 6 hours as needed for pain (headache)                                AEROSPAN 80 MCG/ACT Aers oral inhaler   INHALE 2 PUFFS PO BID.  RINSE MOUTH AFTERWARDS   Generic drug:  flunisolide HFA                                * albuterol 108 (90 BASE) MCG/ACT Inhaler   Commonly known as:  PROAIR HFA/PROVENTIL HFA/VENTOLIN HFA   Inhale 2 puffs into the lungs every 6 hours as needed  for shortness of breath / dyspnea or wheezing                                * albuterol (2.5 MG/3ML) 0.083% neb solution   INL 1 VIAL VIA NEBULIZATION Q 4 TO 6 HOURS PRN                                ASPIRIN LOW DOSE 81 MG EC tablet   TAKE 1 TABLET BY MOUTH EVERY DAY   Generic drug:  aspirin                                azelastine 0.1 % spray   Commonly known as:  ASTELIN   U 2 SPRAYS IEN BID                                bacitracin ophthalmic ointment   Apply to incision line three times daily.   Last time this was given:  1 Tube on 11/17/2017  2:02 PM                                * BASAGLAR 100 UNIT/ML injection   Inject 40 Units Subcutaneous daily                                * BASAGLAR 100 UNIT/ML injection   Inject 40 Units Subcutaneous daily                                blood glucose monitoring lancets   Use to test blood sugars 4 times daily or as directed.                                blood glucose monitoring meter device kit   Commonly known as:  no brand specified   Use to test blood sugar 4 X times daily or as directed.                                blood glucose monitoring test strip   Commonly known as:  no brand specified   1 strip by In Vitro route 4 times daily Test as directed. Please dispense three months and three months of refills. Thank you.                                clopidogrel 75 MG tablet   Commonly known as:  PLAVIX   Take 1 tablet (75 mg) by mouth daily                                * COMPRESSION STOCKINGS   2 each daily                                * COMPRESSION STOCKINGS   2 each daily Apply one 10-15 mmHg compression stocking to each lower extgmierty during the day and remove before bedtime.                                cyclobenzaprine 10 MG tablet   Commonly known as:  FLEXERIL   Take 1 tablet (10 mg) by mouth 2 times daily as needed for muscle spasms                                desonide 0.05 % cream   Commonly known as:  DESOWEN   Apply topically 2 times  daily                                dicyclomine 20 MG tablet   Commonly known as:  BENTYL   Take 1 tablet (20 mg) by mouth 4 times daily as needed                                diphenhydrAMINE 25 MG capsule   Commonly known as:  BENADRYL   TK 1 TO 2 CS PO QHS                                EPIPEN 2-RIKY 0.3 MG/0.3ML injection 2-pack   INJECT 0.3 MG INTO THE MUSCLE PRF ANAPHYALAXIS   Generic drug:  EPINEPHrine                                ferrous gluconate 324 (38 FE) MG tablet   Commonly known as:  FERGON   Take 1 tablet (324 mg) by mouth 2 times daily                                fexofenadine 180 MG tablet   Commonly known as:  ALLEGRA   Take 1 tablet by mouth daily as needed.                                fluocinolone 0.01 % solution   Commonly known as:  SYNALAR   Apply topically daily as needed                                fluocinonide 0.05 % solution   Commonly known as:  LIDEX   Apply topically 2 times daily To areas of itch on the scalp as needed.                                folic acid 1 MG tablet   Commonly known as:  FOLVITE   TK 1 T PO D                                hydroxychloroquine 200 MG tablet   Commonly known as:  PLAQUENIL   Take 2 tablets (400 mg) by mouth daily                                insulin pen needle 32G X 4 MM   Commonly known as:  BD MARCK U/F   USE DAILY OR AS DIRECTED                                * ketoconazole 2 % shampoo   Commonly known as:  NIZORAL   Apply topically daily as needed                                * ketoconazole 2 % cream   Commonly known as:  NIZORAL                                lidocaine 5 % ointment   Commonly known as:  XYLOCAINE   Apply 1 g topically 2 times daily as needed for moderate pain                                lisinopril-hydrochlorothiazide 20-25 MG per tablet   Commonly known as:  PRINZIDE/ZESTORETIC   TAKE 1 TABLET BY MOUTH DAILY                                Magnesium Oxide -Mg Supplement 250 MG Tabs   TK 1 T PO BID. REDUCE IF  STOOLS LOOSEN                                metFORMIN 500 MG 24 hr tablet   Commonly known as:  GLUCOPHAGE-XR   Take 2 tablets (1,000 mg) by mouth daily (with dinner)                                * metroNIDAZOLE 1 % cream   Commonly known as:  NORITATE   Apply topically daily                                * metroNIDAZOLE 0.75 % cream   Commonly known as:  METROCREAM   CECI EXT TO FACE BID                                mometasone 50 MCG/ACT spray   Commonly known as:  NASONEX   Spray 2 sprays into both nostrils daily                                montelukast 10 MG tablet   Commonly known as:  SINGULAIR   Take 1 tablet (10 mg) by mouth At Bedtime                                nitroGLYcerin 0.4 MG sublingual tablet   Commonly known as:  NITROSTAT   Place 1 tablet (0.4 mg) under the tongue every 5 minutes as needed for chest pain                                nystatin 215031 UNITS Tabs tablet   Commonly known as:  MYCOSTATIN   TK 2 TS PO BID                                olopatadine 0.1 % ophthalmic solution   Commonly known as:  PATANOL   Place 1 drop into both eyes 2 times daily as needed for allergies.                                pravastatin 40 MG tablet   Commonly known as:  PRAVACHOL   Take 1 tablet (40 mg) by mouth daily                                ranitidine 150 MG tablet   Commonly known as:  ZANTAC   Take 1 tablet (150 mg) by mouth 2 times daily                                spironolactone 25 MG tablet   Commonly known as:  ALDACTONE   Take 2 tablets (50 mg) by mouth daily                                sucralfate 1 GM tablet   Commonly known as:  CARAFATE   Take 1 tablet (1 g) by mouth 4 times daily as needed                                SYMBICORT 80-4.5 MCG/ACT Inhaler   INHALE 2 PUFFS PO BID RINSE AND EXPECTORATE AFTER   Generic drug:  budesonide-formoterol                                TOPROL XL PO   Take 100 mg by mouth daily                                traMADol 50 MG tablet   Commonly  known as:  ULTRAM   Take 1 tablet (50 mg) by mouth every 8 hours as needed for moderate pain                                TUMS 500 MG chewable tablet   Take 3-4 chew tab by mouth daily as needed.   Generic drug:  calcium carbonate                                vitamin D 25723 UNIT capsule   Commonly known as:  ERGOCALCIFEROL   Take 1 capsule (50,000 Units) by mouth every 7 days Need a Vitamin D level in 2 months.                                SONJA RUTHERFORD                                * Notice:  This list has 10 medication(s) that are the same as other medications prescribed for you. Read the directions carefully, and ask your doctor or other care provider to review them with you.

## 2017-11-17 NOTE — OR NURSING
Pt was ready to DC at 1500 but her mother is not here yet.  Mother was called upon pt's arrival to Phase II.

## 2017-11-17 NOTE — DISCHARGE INSTRUCTIONS
[facility]   Eyelid/Orbital Surgery Discharge Instructions    Myron Cyr M.D..     ICE COMPRESSES  Immediately following surgery, you should begin to apply ice compresses.  Apply a cold gel pack or wrap a clean washcloth around a cup of crushed ice in a plastic bag (a bag of frozen peas also works well) and hold the cold compresses directly against the closed eyelid (s).Apply cold pack for a minimum of six times daily for no longer than 15 minutes at a time. Continue cold compresses every day until the bruising and swelling begin to subside.  This can vary for each patient, but three 3 days may be common.    HOT COMPRESSES  After your swelling and bruising have begun to subside, hot compresses should be applied.  Take a clean washcloth and wring it out in hot water (as warm as you can tolerate comfortably).  Hold this warm compress against the closed eyelid(s) at least six times per day for 15 minutes.  This should be continued for about two weeks.    OINTMENT  You may be given some ointment when you leave the hospital.  Apply this ointment to the suture line(s) twice a day, 1/4 inch into the eye at bedtime for 7 days.  Expect some blurring of vision from the ointment.     ACTIVITY  Avoid heavy lifting or vigorous exercise for one week after surgery.  You may resume regular activities as tolerated.  You may shower and wash your hair on the day after surgery; be careful to avoid getting shampoo in your eyes. While your eyes are still swelling, it is recommended you sleep on your back and elevate your head with 2-3 pillows.    MEDICATION  If the doctor has given you some medications to take after surgery, please take these according to the instructions on the bottle.  Pain medications may make you drowsy so do not drive, operate heavy machinery, or use alcohol while taking it.  When you feel that you do not need the prescription pain medication, you may substitute Extra Strength Tylenol for mild  pain by also following the directions on the bottle.    If you were taking Coumadin (warfarin) prior to your surgery, you may resume this medication with your next scheduled dose.    WHAT TO EXPECT  You should expect some slight oozing of blood from the incision site over the first two to three days after surgery.  Swelling and bruising will occur for one to two weeks or longer.  You may also experience itching and tearing during the first several weeks after surgery.  This is part of the normal healing process    QUESTIONS  Please feel free to contact the office, should you have any questions that are not answered above.  The phone number is [phone].  Please call immediately if you are unable to establish vision in the operative eye, you are experiencing heavy bleeding that will not stop with gentle pressure or you have any signs of an infection (greenish/yellow discharge or progressive redness).    [facility]  [facility] [place] [address]  [place], [place] [address]  [phone]      [facility] Anesthesia [facility] Discharge  Instructions  Anesthesia (Eye Care Center)   Adult Discharge Instructions    For 24 hours after surgery    1. Get plenty of rest.  Make arrangements to have a responsible adult stay with you for at least 6 hours after you leave the hospital.  2. Do not drive or use heavy equipment for 24 hours.    3. Do not drink alcohol for 24 hours.  4. Do not sign legal documents or make important decisions for 24 hours.  5. Avoid strenuous or risky activities. You may feel lightheaded.  If so, sit for a few minutes before standing.  Have someone help you get up.   6. Conscious sedation patients may resume a regular diet..  7. Any questions of medical nature, call your physician.

## 2017-11-17 NOTE — ANESTHESIA POSTPROCEDURE EVALUATION
Patient: Janine Cornell    Procedure(s):  RIGHT UPPER LID PTOSIS REPAIR - Wound Class: I-Clean    Diagnosis:RIGHT UPPER LID PTOSIS  Diagnosis Additional Information: No value filed.    Anesthesia Type:  MAC    Note:  Anesthesia Post Evaluation    Patient location during evaluation: bedside  Patient participation: Able to fully participate in evaluation  Level of consciousness: awake  Pain management: adequate  Airway patency: patent  Cardiovascular status: acceptable  Respiratory status: acceptable  Hydration status: acceptable  PONV: none     Anesthetic complications: None          Last vitals:  Vitals:    11/17/17 1422 11/17/17 1435   BP: 103/65 (P) 104/62   Resp: 16 (P) 16   SpO2: 98% (P) 98%         Electronically Signed By: Hector Way DO, DO  November 17, 2017  4:44 PM

## 2017-11-20 NOTE — TELEPHONE ENCOUNTER
MTM referral from: [facility] visit (referral by provider)    MTM referral outreach attempt #2 on November 20, 2017 at 4:05 PM      Outcome: Patient is not interested at this time because she said she would call back when she is ready-she recently had surgery and di dnot want to schedule at this point, will route to MTM Pharmacist/Provider as an FYI. Thank you for the referral.     Eda Gore, MTM Coordinator

## 2017-11-21 ENCOUNTER — TELEPHONE (OUTPATIENT)
Dept: OBGYN | Facility: CLINIC | Age: 52
End: 2017-11-21

## 2017-11-21 NOTE — TELEPHONE ENCOUNTER
----- Message from Renetta Frank sent at 11/21/2017  8:38 AM CST -----  Regarding: PT REQUESTING TO SEE [facility]KITE ASAP  Contact: [phone]  Patient called in requesting an appointment with Dr Carrie GREEN as she is having very abnormal she said she will bleed for about 2 to 3 weeks and then stop a week or 2 then start again.    Please call her back today if possible she is wanting to get in ASAP @ [phone]    Thank you!    Renetta Frank  Intake representative   Call Center

## 2017-11-21 NOTE — TELEPHONE ENCOUNTER
Spoke with Janine who reports that for the past 3 months she has been having spotting for 4-5 days and then it turns into bright red bleeding that lasts for 2-3 weeks. Right now she is bleeding and it has been constant since 11/5/17. She soaks a maxi pad 2-3 times per day. She is feeling dizzy and lightheaded that comes and goes throughout the day. She has nausea and pain above her belly button but this pain has been present for several months. Denies feeling of increased heartbeat.    Nurse advised she go to the ED as she is showing symptoms of too much blood loss. She reports she recently had surgery so her blood level was checked and is fine so she won't go to the ED.    Review of EPIC shows she had CBC on 11/15 that was WNL.    Scheduled her for clinic appointment tomorrow. Bleeding precautions given.

## 2017-11-22 ENCOUNTER — OFFICE VISIT (OUTPATIENT)
Dept: OBGYN | Facility: CLINIC | Age: 52
End: 2017-11-22
Attending: OBSTETRICS & GYNECOLOGY
Payer: COMMERCIAL

## 2017-11-22 VITALS
HEART RATE: 64 BPM | BODY MASS INDEX: 28.88 KG/M2 | SYSTOLIC BLOOD PRESSURE: 118 MMHG | HEIGHT: 63 IN | DIASTOLIC BLOOD PRESSURE: 79 MMHG | WEIGHT: 163 LBS

## 2017-11-22 DIAGNOSIS — N93.9 ABNORMAL UTERINE BLEEDING (AUB): Primary | ICD-10-CM

## 2017-11-22 DIAGNOSIS — N92.6 IRREGULAR BLEEDING: Primary | ICD-10-CM

## 2017-11-22 PROCEDURE — 76830 TRANSVAGINAL US NON-OB: CPT | Mod: ZF

## 2017-11-22 ASSESSMENT — PAIN SCALES - GENERAL: PAINLEVEL: NO PAIN (0)

## 2017-11-22 NOTE — LETTER
11/22/2017     RE: Janine Cornell  [address] [address]     Dear Colleague,    Thank you for referring your patient, Janine Cornell, to the [facility] at [facility]. Please see a copy of my visit note below.    52 year old female presents for gynecologic ultrasound indicated by irregular bleeding.  This study was done transvaginally.    Uterine findings:   Presence: Visible Size: Normal 5.6 x 5.2 x 3.6cm.  Endometrium = 8.3 mm. IUD in place, ?polyp   Cx length = 34.0mm.      Flexion:  Anteverted Position: Midline Margins: Smooth Shape: Normal   Contour: Regular Texture: Homogeneous Cavity: Abnormal Masses: Abnormal-Posterior right myoma: 2.9 x 2.9 x 3.3cm    Pelvic findings:    Right Adnexa: Normal   Left Adnexa: Normal   Bladder:  Normal         Cul - de - sac fluid: None    Ovarian follicles:   Right ovary:  2.4 x 2.6 x 2.2cm.     0 follicles        Left ovary:  3.0 x 2.4 x 1.7cm.     0 follicles    Comments:  IUD in place. Possible polyp in endometrial cavity, though view limited by IUD. Fibroid does not appear to be impinging on uterine cavity.     TANVI Schaefer MD

## 2017-11-22 NOTE — PROGRESS NOTES
52 year old female presents for gynecologic ultrasound indicated by irregular bleeding.  This study was done transvaginally.    Uterine findings:   Presence: Visible Size: Normal 5.6 x 5.2 x 3.6cm.  Endometrium = 8.3 mm. IUD in place, ?polyp   Cx length = 34.0mm.      Flexion:  Anteverted Position: Midline Margins: Smooth Shape: Normal   Contour: Regular Texture: Homogeneous Cavity: Abnormal Masses: Abnormal-Posterior right myoma: 2.9 x 2.9 x 3.3cm    Pelvic findings:    Right Adnexa: Normal   Left Adnexa: Normal   Bladder:  Normal         Cul - de - sac fluid: None    Ovarian follicles:   Right ovary:  2.4 x 2.6 x 2.2cm.     0 follicles        Left ovary:  3.0 x 2.4 x 1.7cm.     0 follicles         Comments:  IUD in place. Possible polyp in endometrial cavity, though view limited by IUD. Fibroid does not appear to be impinging on uterine cavity.     TANVI Schaefer MD

## 2017-11-22 NOTE — MR AVS SNAPSHOT
After Visit Summary   11/22/2017    Janine Cornell    MRN: [id number]           Patient Information     Date Of Birth          1965        Visit Information        Provider Department      11/22/2017 3:15 PM Jacqui Ortega MD [facility]        Today's Diagnoses     Abnormal uterine bleeding (AUB)    -  1       Follow-ups after your visit        Your next 10 appointments already scheduled     Dec 20, 2017  3:30 PM CST   Lab with  LAB   [facility] ([facility])    [address] [place] [address]  [address] [facility] [address] [phone]            Dec 20, 2017  4:00 PM CST   (Arrive by 3:45 PM)   Return Visit with Milan Peace MD   [facility])    [address] [place] [address]  [address] [facility] [address] [phone]            Feb 14, 2018  2:05 PM CST   (Arrive by 1:50 PM)   Return Visit with Kash Solano MD   [facility] ([facility])    [address] [place] [address]  [address] [facility] [id number] [phone]              Who to contact     Please call your clinic at [phone] to:    Ask questions about your health    Make or cancel appointments    Discuss your medicines    Learn about your test results    Speak to your doctor   If you have compliments or concerns about an experience at your clinic, or if you wish to file a complaint, please contact [facility] Patient Relations at [phone] or email us at [email or web address]         Additional Information About Your Visit        MyChart Information     [employer] gives you secure access to your electronic health record. If you see a primary care provider, you can also send messages to your care team and make appointments. If you have questions, please call your primary care clinic.  If you do not have a primary care provider, please call  "[phone] and they will assist you.      [employer] is an electronic gateway that provides easy, online access to your medical records. With [employer], you can request a clinic appointment, read your test results, renew a prescription or communicate with your care team.     To access your existing account, please contact your [facility] or call [phone] for assistance.        Care EveryWhere ID     This is your Care EveryWhere ID. This could be used by other organizations to access your [place] medical records  [id number]        Your Vitals Were     Pulse Height Last Period BMI (Body Mass Index)          64 1.6 m (5' 2.99\") 11/06/2017 28.88 kg/m2         Blood Pressure from Last 3 Encounters:   11/22/17 118/79   11/17/17 (P) 104/62   11/15/17 130/74    Weight from Last 3 Encounters:   11/22/17 73.9 kg (163 lb)   11/15/17 73.2 kg (161 lb 6.4 oz)   08/04/17 73.6 kg (162 lb 4.8 oz)              We Performed the Following     Nicole-Operative Worksheet (Operative Hysteroscopy)          Today's Medication Changes          These changes are accurate as of: 11/22/17 11:59 PM.  If you have any questions, ask your nurse or doctor.               These medicines have changed or have updated prescriptions.        Dose/Directions    * BASAGLAR 100 UNIT/ML injection   This may have changed:  Another medication with the same name was changed. Make sure you understand how and when to take each.   Used for:  Type 2 diabetes mellitus without complication (H)   Changed by:  Kash Solano MD        Dose:  40 Units   Inject 40 Units Subcutaneous daily   Quantity:  18 mL   Refills:  3       * BASAGLAR 100 UNIT/ML injection   This may have changed:  additional instructions   Used for:  Type 2 diabetes mellitus without complication (H)   Changed by:  Kash Solano MD        Dose:  40 Units   Inject 40 Units Subcutaneous daily   Quantity:  12 mL   Refills:  2       vitamin D 97441 UNIT capsule "   Commonly known as:  ERGOCALCIFEROL   This may have changed:  additional instructions   Used for:  Vitamin D deficiency        Dose:  93003 Units   Take 1 capsule (50,000 Units) by mouth every 7 days Need a Vitamin D level in 2 months.   Quantity:  8 capsule   Refills:  0       * Notice:  This list has 2 medication(s) that are the same as other medications prescribed for you. Read the directions carefully, and ask your doctor or other care provider to review them with you.             Primary Care Provider Office Phone # Fax #    Kash Solano [phone]        [address] [address]        Equal Access to Services     [facility]: Hadii frederick June, waaxda janene, qaybta kaalmada alex, gagandeep phillip . So [facility] [phone].    ATENCIÓN: Si habla español, tiene a burch disposición servicios gratuitos de asistencia lingüística. LlBl[facility] [phone].    We comply with applicable federal civil rights laws and [place] laws. We do not discriminate on the basis of race, color, national origin, age, disability, sex, sexual orientation, or gender identity.            Thank you!     Thank you for choosing [facility]  for your care. Our goal is always to provide you with excellent care. Hearing back from our patients is one way we can continue to improve our services. Please take a few minutes to complete the written survey that you may receive in the mail after your visit with us. Thank you!             Your Updated Medication List - Protect others around you: Learn how to safely use, store and throw away your medicines at www.disposemymeds.org.          This list is accurate as of: 11/22/17 11:59 PM.  Always use your most recent med list.                   Brand Name Dispense Instructions for use Diagnosis    acetaminophen 325 MG tablet    TYLENOL    250 tablet    Take 1-2 tablets (325-650 mg) by mouth every 6  hours as needed for pain (headache)    Other chronic pain, Headache(784.0)       AEROSPAN 80 MCG/ACT Aers oral inhaler   Generic drug:  flunisolide HFA      INHALE 2 PUFFS PO BID.  RINSE MOUTH AFTERWARDS        * albuterol 108 (90 BASE) MCG/ACT Inhaler    PROAIR HFA/PROVENTIL HFA/VENTOLIN HFA    3 Inhaler    Inhale 2 puffs into the lungs every 6 hours as needed for shortness of breath / dyspnea or wheezing        * albuterol (2.5 MG/3ML) 0.083% neb solution      INL 1 VIAL VIA NEBULIZATION Q 4 TO 6 HOURS PRN        azelastine 0.1 % spray    ASTELIN     U 2 SPRAYS IEN BID        bacitracin ophthalmic ointment     3.5 g    Apply to incision line three times daily.    Postoperative eye state       * BASAGLAR 100 UNIT/ML injection     18 mL    Inject 40 Units Subcutaneous daily    Type 2 diabetes mellitus without complication (H)       * BASAGLAR 100 UNIT/ML injection     12 mL    Inject 40 Units Subcutaneous daily    Type 2 diabetes mellitus without complication (H)       blood glucose monitoring lancets     4 Box    Use to test blood sugars 4 times daily or as directed.    Type 2 diabetes, HbA1c goal < 7% (H)       blood glucose monitoring meter device kit    no brand specified    1 kit    Use to test blood sugar 4 X times daily or as directed.    Type 2 diabetes, HbA1c goal < 7% (H)       blood glucose monitoring test strip    no brand specified    360 each    1 strip by In Vitro route 4 times daily Test as directed. Please dispense three months and three months of refills. Thank you.    Type 2 diabetes, HbA1c goal < 7% (H)       clopidogrel 75 MG tablet    PLAVIX    90 tablet    Take 1 tablet (75 mg) by mouth daily        * COMPRESSION STOCKINGS     4 each    2 each daily    Bilateral lower extremity edema, Venous incompetence       * COMPRESSION STOCKINGS     4 each    2 each daily Apply one 10-15 mmHg compression stocking to each lower extgmierty during the day and remove before bedtime.    Venous incompetence,  Bilateral lower extremity edema       cyclobenzaprine 10 MG tablet    FLEXERIL    180 tablet    Take 1 tablet (10 mg) by mouth 2 times daily as needed for muscle spasms    Fibromyalgia       desonide 0.05 % cream    DESOWEN     Apply topically 2 times daily        dicyclomine 20 MG tablet    BENTYL    60 tablet    Take 1 tablet (20 mg) by mouth 4 times daily as needed    Other irritable bowel syndrome       diphenhydrAMINE 25 MG capsule    BENADRYL     TK 1 TO 2 CS PO QHS        EPIPEN 2-RIKY 0.3 MG/0.3ML injection 2-pack   Generic drug:  EPINEPHrine      INJECT 0.3 MG INTO THE MUSCLE PRF ANAPHYALAXIS        ferrous gluconate 324 (38 FE) MG tablet    FERGON    180 tablet    Take 1 tablet (324 mg) by mouth 2 times daily    AILEEN (iron deficiency anemia)       fexofenadine 180 MG tablet    ALLEGRA    90 tablet    Take 1 tablet by mouth daily as needed.    Chronic rhinitis       fluocinolone 0.01 % solution    SYNALAR     Apply topically daily as needed        fluocinonide 0.05 % solution    LIDEX    60 mL    Apply topically 2 times daily To areas of itch on the scalp as needed.    Telogen effluvium       folic acid 1 MG tablet    FOLVITE     TK 1 T PO D        hydroxychloroquine 200 MG tablet    PLAQUENIL    180 tablet    Take 2 tablets (400 mg) by mouth daily    Inflammatory arthritis       insulin pen needle 32G X 4 MM    BD MARCK U/F    300 each    USE DAILY OR AS DIRECTED    Type 2 diabetes, HbA1c goal < 7% (H)       * ketoconazole 2 % shampoo    NIZORAL     Apply topically daily as needed        * ketoconazole 2 % cream    NIZORAL          lidocaine 5 % ointment    XYLOCAINE    50 g    Apply 1 g topically 2 times daily as needed for moderate pain    Fibromyalgia, Rheumatoid arthritis involving both wrists with positive rheumatoid factor (H)       lisinopril-hydrochlorothiazide 20-25 MG per tablet    PRINZIDE/ZESTORETIC    90 tablet    TAKE 1 TABLET BY MOUTH DAILY    HTN (hypertension)       Magnesium Oxide -Mg  Supplement 250 MG Tabs      TK 1 T PO BID. REDUCE IF STOOLS LOOSEN        metFORMIN 500 MG 24 hr tablet    GLUCOPHAGE-XR    60 tablet    Take 2 tablets (1,000 mg) by mouth daily (with dinner)    Type 2 diabetes mellitus not at goal (H)       * metroNIDAZOLE 1 % cream    NORITATE     Apply topically daily        * metroNIDAZOLE 0.75 % cream    METROCREAM     CECI EXT TO FACE BID        mometasone 50 MCG/ACT spray    NASONEX     Spray 2 sprays into both nostrils daily        montelukast 10 MG tablet    SINGULAIR    30 tablet    Take 1 tablet (10 mg) by mouth At Bedtime    Uncomplicated asthma, unspecified asthma severity       nitroGLYcerin 0.4 MG sublingual tablet    NITROSTAT    25 tablet    Place 1 tablet (0.4 mg) under the tongue every 5 minutes as needed for chest pain    NSTEMI (non-ST elevated myocardial infarction) (H)       nystatin 269150 UNITS Tabs tablet    MYCOSTATIN     TK 2 TS PO BID        olopatadine 0.1 % ophthalmic solution    PATANOL    5 mL    Place 1 drop into both eyes 2 times daily as needed for allergies.    Chronic rhinitis       pravastatin 40 MG tablet    PRAVACHOL    90 tablet    Take 1 tablet (40 mg) by mouth daily    CAD (coronary artery disease)       ranitidine 150 MG tablet    ZANTAC    60 tablet    Take 1 tablet (150 mg) by mouth 2 times daily    Gastritis       spironolactone 25 MG tablet    ALDACTONE    60 tablet    Take 2 tablets (50 mg) by mouth daily    Hypertension goal BP (blood pressure) < 140/90       sucralfate 1 GM tablet    CARAFATE    120 tablet    Take 1 tablet (1 g) by mouth 4 times daily as needed    Abdominal pain, epigastric       SYMBICORT 80-4.5 MCG/ACT Inhaler   Generic drug:  budesonide-formoterol      INHALE 2 PUFFS PO BID RINSE AND EXPECTORATE AFTER        TOPROL XL PO      Take 100 mg by mouth daily        traMADol 50 MG tablet    ULTRAM    60 tablet    Take 1 tablet (50 mg) by mouth every 8 hours as needed for moderate pain    Undifferentiated connective  tissue disease (H)       TUMS 500 MG chewable tablet   Generic drug:  calcium carbonate      Take 3-4 chew tab by mouth daily as needed.        vitamin D 25686 UNIT capsule    ERGOCALCIFEROL    8 capsule    Take 1 capsule (50,000 Units) by mouth every 7 days Need a Vitamin D level in 2 months.    Vitamin D deficiency       ZOFRAN PO           * Notice:  This list has 10 medication(s) that are the same as other medications prescribed for you. Read the directions carefully, and ask your doctor or other care provider to review them with you.

## 2017-11-22 NOTE — PROGRESS NOTES
"SUBJECTIVE   Janine Cornell is a 52 year old , Patient's last menstrual period was 2017., patient of fibroid clinic with multiple medical comorbidities who presents for acute on chronic irregular bleeding.    -- UFE for large fundal myoma 10/29/14 for HMB (2x month)  -- Return of 2x monthly HMB -2016, myoma stable  -- 2016 D&C (benign) and LNG-IUS placement    Pt states she had daily spotting for 7 months postop, did well with amenorrhea for many months and then ~? One month ago (pt couldn't really quantify days of bleeding) had return of bleeding - at times bright red, at times \"black strings\" to brown.  Spotting --> black strings --> \"crumbly\" --> \"goopey\".    Feels frustrated, wants to know what bleeding is from, why persists.    Past Medical History  Past Medical History:   Diagnosis Date     Abnormal glandular Papanicolaou smear of cervix      Allergic rhinitis     Allergy, airborne subst     Arthritis      ASCVD (arteriosclerotic cardiovascular disease)      Chronic pain      Chronic pancreatitis (H)     idiopathic, Tx: PPI, antioxidants, pancreatic enzymes     Common migraine      Coronary artery disease      Costochondritis      Costochondritis      Difficulty in walking(719.7)      Dyspnea on exertion      Ectasia, mammary duct     [facility], persistent nipple discharge     Elevated fasting glucose      Gastro-oesophageal reflux disease      Hernia      History of angina      Hyperlipidemia LDL goal < 100      Hypertension goal BP (blood pressure) < 140/90     Essential hypertension     Iron deficiency anemia      Ischemic cardiomyopathy      Menorrhagia      Migraine headaches      Mild persistent asthma      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 Matias, neg w/u, Rx'd w/prednisone     Ventral hernia, unspecified, without mention of obstruction or gangrene 7/2012       Medications  Current Outpatient Prescriptions   Medication     ketoconazole (NIZORAL) 2 % cream     metroNIDAZOLE (METROCREAM) 0.75 % cream     folic acid (FOLVITE) 1 MG tablet     azelastine (ASTELIN) 0.1 % spray     SYMBICORT 80-4.5 MCG/ACT Inhaler     pravastatin (PRAVACHOL) 40 MG tablet     cyclobenzaprine (FLEXERIL) 10 MG tablet     hydroxychloroquine (PLAQUENIL) 200 MG tablet     spironolactone (ALDACTONE) 25 MG tablet     BASAGLAR 100 UNIT/ML injection     Metoprolol Succinate (TOPROL XL PO)     bacitracin ophthalmic ointment     COMPRESSION STOCKINGS     COMPRESSION STOCKINGS     insulin pen needle (BD MARCK U/F) 32G X 4 MM     insulin glargine (BASAGLAR KWIKPEN) 100 UNIT/ML injection     ranitidine (ZANTAC) 150 MG tablet     lidocaine (XYLOCAINE) 5 % ointment     traMADol (ULTRAM) 50 MG tablet     ferrous gluconate (FERGON) 324 (38 FE) MG tablet     blood glucose monitoring (ONE TOUCH DELICA) lancets     vitamin D (ERGOCALCIFEROL) 79512 UNIT capsule     clopidogrel (PLAVIX) 75 MG tablet     lisinopril-hydrochlorothiazide (PRINZIDE/ZESTORETIC) 20-25 MG per tablet     blood glucose monitoring (NO BRAND SPECIFIED) meter device kit     blood glucose monitoring (NO BRAND SPECIFIED) test strip     diphenhydrAMINE (BENADRYL) 25 MG capsule     EPIPEN 2-RIKY 0.3 MG/0.3ML injection     AEROSPAN 80 MCG/ACT AERS     Magnesium Oxide -Mg Supplement 250 MG TABS     nystatin  "(MYCOSTATIN) 780689 UNITS TABS tablet     albuterol (2.5 MG/3ML) 0.083% neb solution     dicyclomine (BENTYL) 20 MG tablet     sucralfate (CARAFATE) 1 GM tablet     fluocinolone (SYNALAR) 0.01 % solution     mometasone (NASONEX) 50 MCG/ACT spray     Ondansetron HCl (ZOFRAN PO)     metFORMIN (GLUCOPHAGE-XR) 500 MG 24 hr tablet     montelukast (SINGULAIR) 10 MG tablet     acetaminophen (TYLENOL) 325 MG tablet     metroNIDAZOLE (NORITATE) 1 % cream     desonide (DESOWEN) 0.05 % cream     ketoconazole (NIZORAL) 2 % shampoo     fluocinonide (LIDEX) 0.05 % external solution     nitroglycerin (NITROSTAT) 0.4 MG SL tablet     albuterol (PROAIR HFA, PROVENTIL HFA, VENTOLIN HFA) 108 (90 BASE) MCG/ACT inhaler     calcium carbonate (TUMS) 500 MG chewable tablet     fexofenadine (ALLEGRA) 180 MG tablet     olopatadine (PATANOL) 0.1 % ophthalmic solution     No current facility-administered medications for this visit.        Allergies  Allergies   Allergen Reactions     Amoxicillin-Pot Clavulanate      Augmentin      Unknown possible hives and edema     Azithromycin      Diatrizoate Other (See Comments)     Pt wants this listed because she is allergic to shellfish      Imitrex [Sumatriptan]      Severe face/neck/chest tightness and flushing side effects      Penicillins Hives     Unknown      Pork Allergy      Stomach pain, cramping, diarrhea, itching, nausea and headaches     Shellfish Allergy Hives and Swelling     Sulfa Drugs Hives and Swelling     Zithromax [Azithromycin Dihydrate] Swelling     Unknown      ROS:  See above in HPI    OBJECTIVE   /79  Pulse 64  Ht 1.6 m (5' 2.99\")  Wt 73.9 kg (163 lb)  LMP 11/06/2017  BMI 28.88 kg/m2  General:  Alert, no distress   Head:  Normocephalic, without obvious abnormality   Lungs:  Clear to auscultation bilaterally   Heart:  Regular rate and rhythm, no murmur   Abdomen:  Soft, non-tender, non-distended, bowel sounds normal   Pelvic: declined   Extremities:  normal " "      Results for JANINE CORNELL (MRN [id number]) as of 2017 13:07   Ref. Range 2017 17:24 11/15/2017 13:59   Hemoglobin Latest Ref Range: 11.7 - 15.7 g/dL 13.9 12.3     TVUS today: \"IUD in place. Possible polyp in endometrial cavity, though view limited by IUD. Fibroid does not appear to be impinging on uterine cavity.\"     ASSESSMENT   Janine Cornell is a 52 year old , irregular bleeding.    PLAN     -- reviewed possible etiologies (myoma, seems less likely, stable/decreased in size) (HPO axis, LNG-IUS, possible polyp on ultrasound today.  Overall, bleeding most recently doesn't sound from history (or hgb review) to be excessively heavy although it seems like amenorrhea is the patient's primary goal because she has \"enough other medical problems to be dealing with this too\".  -- pt offered but declined office sampling today  -- reviewed mgmt options, pt would like to proceed with hysteroscopy/possible polypectomy/possible LNG-IUD removal and replacement   -- further mgmt depending on hysteroscopy results     Total visit time was 25 minutes with >50% time spent in counseling and coordination of care for AUB-L/O/I/P.  Jacqui Ureña MD MPH    "

## 2017-11-22 NOTE — MR AVS SNAPSHOT
After Visit Summary   11/22/2017    Janine Cornell    MRN: [id number]           Patient Information     Date Of Birth          1965        Visit Information        Provider Department      11/22/2017 2:30 PM [facility]TRASOUND II [facility]        Today's Diagnoses     Irregular bleeding    -  1       Follow-ups after your visit        Your next 10 appointments already scheduled     Dec 20, 2017  3:30 PM CST   Lab with [facility]   [facility] ([facility])    [address] [place] [address]  1st Floor  [facility] [id number] [phone]            Dec 20, 2017  4:00 PM CST   (Arrive by 3:45 PM)   Return Visit with Milan Peace MD   [facility] ([facility])    [address] [place] [address]  3rd Floor  [facility] [id number] [phone]            Feb 14, 2018  2:05 PM CST   (Arrive by 1:50 PM)   Return Visit with Kash Solano MD   [facility] ([facility])    [address] [place] [address]  4th Floor  [facility] [id number] [phone]              Who to contact     Please call your clinic at [phone] to:    Ask questions about your health    Make or cancel appointments    Discuss your medicines    Learn about your test results    Speak to your doctor   If you have compliments or concerns about an experience at your clinic, or if you wish to file a complaint, please contact [facility] Patient Relations at [phone] or email us at [email or web address]         Additional Information About Your Visit        MyChart Information     [employer] gives you secure access to your electronic health record. If you see a primary care provider, you can also send messages to your care team and make appointments. If you have questions, please call your primary care clinic.  If you do not have a primary care provider, please call [phone] and they will  assist you.      [employer] is an electronic gateway that provides easy, online access to your medical records. With [employer], you can request a clinic appointment, read your test results, renew a prescription or communicate with your care team.     To access your existing account, please contact your [facility] or call [phone] for assistance.        Care EveryWhere ID     This is your Care EveryWhere ID. This could be used by other organizations to access your [facility] medical records  [id number]        Your Vitals Were     Last Period                   11/06/2017            Blood Pressure from Last 3 Encounters:   11/22/17 118/79   11/17/17 (P) 104/62   11/15/17 130/74    Weight from Last 3 Encounters:   11/22/17 73.9 kg (163 lb)   11/15/17 73.2 kg (161 lb 6.4 oz)   08/04/17 73.6 kg (162 lb 4.8 oz)                 Today's Medication Changes          These changes are accurate as of: 11/22/17  4:16 PM.  If you have any questions, ask your nurse or doctor.               These medicines have changed or have updated prescriptions.        Dose/Directions    * BASAGLAR 100 UNIT/ML injection   This may have changed:  Another medication with the same name was changed. Make sure you understand how and when to take each.   Used for:  Type 2 diabetes mellitus without complication (H)   Changed by:  Kash Solano MD        Dose:  40 Units   Inject 40 Units Subcutaneous daily   Quantity:  18 mL   Refills:  3       * BASAGLAR 100 UNIT/ML injection   This may have changed:  additional instructions   Used for:  Type 2 diabetes mellitus without complication (H)   Changed by:  Kash Solano MD        Dose:  40 Units   Inject 40 Units Subcutaneous daily   Quantity:  12 mL   Refills:  2       vitamin D 78011 UNIT capsule   Commonly known as:  ERGOCALCIFEROL   This may have changed:  additional instructions   Used for:  Vitamin D deficiency        Dose:  19140 Units   Take 1 capsule  (50,000 Units) by mouth every 7 days Need a Vitamin D level in 2 months.   Quantity:  8 capsule   Refills:  0       * Notice:  This list has 2 medication(s) that are the same as other medications prescribed for you. Read the directions carefully, and ask your doctor or other care provider to review them with you.             Primary Care Provider Office Phone # Fax #    Kash Solano [phone]       [address] [address]        Equal Access to Services     MANDA QUINN : Hadii aad ku hadasho Soomaali, waaxda luqadaha, qaybta kaalmada adeegyada, waxay idiin hayaan sidra phillip . So [facility] [phone].    ATENCIÓN: Si habla español, tiene a burch disposición servicios gratuitos de asistencia lingüística. LlSouthern [facility] Center [phone].    We comply with applicable federal civil rights laws and [place] laws. We do not discriminate on the basis of race, color, national origin, age, disability, sex, sexual orientation, or gender identity.            Thank you!     Thank you for choosing [facility]  for your care. Our goal is always to provide you with excellent care. Hearing back from our patients is one way we can continue to improve our services. Please take a few minutes to complete the written survey that you may receive in the mail after your visit with us. Thank you!             Your Updated Medication List - Protect others around you: Learn how to safely use, store and throw away your medicines at www.disposemymeds.org.          This list is accurate as of: 11/22/17  4:16 PM.  Always use your most recent med list.                   Brand Name Dispense Instructions for use Diagnosis    acetaminophen 325 MG tablet    TYLENOL    250 tablet    Take 1-2 tablets (325-650 mg) by mouth every 6 hours as needed for pain (headache)    Other chronic pain, Headache(784.0)       AEROSPAN 80 MCG/ACT Aers oral inhaler   Generic drug:  flunisolide HFA      INHALE 2  PUFFS PO BID.  RINSE MOUTH AFTERWARDS        * albuterol 108 (90 BASE) MCG/ACT Inhaler    PROAIR HFA/PROVENTIL HFA/VENTOLIN HFA    3 Inhaler    Inhale 2 puffs into the lungs every 6 hours as needed for shortness of breath / dyspnea or wheezing        * albuterol (2.5 MG/3ML) 0.083% neb solution      INL 1 VIAL VIA NEBULIZATION Q 4 TO 6 HOURS PRN        azelastine 0.1 % spray    ASTELIN     U 2 SPRAYS IEN BID        bacitracin ophthalmic ointment     3.5 g    Apply to incision line three times daily.    Postoperative eye state       * BASAGLAR 100 UNIT/ML injection     18 mL    Inject 40 Units Subcutaneous daily    Type 2 diabetes mellitus without complication (H)       * BASAGLAR 100 UNIT/ML injection     12 mL    Inject 40 Units Subcutaneous daily    Type 2 diabetes mellitus without complication (H)       blood glucose monitoring lancets     4 Box    Use to test blood sugars 4 times daily or as directed.    Type 2 diabetes, HbA1c goal < 7% (H)       blood glucose monitoring meter device kit    no brand specified    1 kit    Use to test blood sugar 4 X times daily or as directed.    Type 2 diabetes, HbA1c goal < 7% (H)       blood glucose monitoring test strip    no brand specified    360 each    1 strip by In Vitro route 4 times daily Test as directed. Please dispense three months and three months of refills. Thank you.    Type 2 diabetes, HbA1c goal < 7% (H)       clopidogrel 75 MG tablet    PLAVIX    90 tablet    Take 1 tablet (75 mg) by mouth daily        * COMPRESSION STOCKINGS     4 each    2 each daily    Bilateral lower extremity edema, Venous incompetence       * COMPRESSION STOCKINGS     4 each    2 each daily Apply one 10-15 mmHg compression stocking to each lower extgmierty during the day and remove before bedtime.    Venous incompetence, Bilateral lower extremity edema       cyclobenzaprine 10 MG tablet    FLEXERIL    180 tablet    Take 1 tablet (10 mg) by mouth 2 times daily as needed for muscle spasms     Fibromyalgia       desonide 0.05 % cream    DESOWEN     Apply topically 2 times daily        dicyclomine 20 MG tablet    BENTYL    60 tablet    Take 1 tablet (20 mg) by mouth 4 times daily as needed    Other irritable bowel syndrome       diphenhydrAMINE 25 MG capsule    BENADRYL     TK 1 TO 2 CS PO QHS        EPIPEN 2-RIKY 0.3 MG/0.3ML injection 2-pack   Generic drug:  EPINEPHrine      INJECT 0.3 MG INTO THE MUSCLE PRF ANAPHYALAXIS        ferrous gluconate 324 (38 FE) MG tablet    FERGON    180 tablet    Take 1 tablet (324 mg) by mouth 2 times daily    AILEEN (iron deficiency anemia)       fexofenadine 180 MG tablet    ALLEGRA    90 tablet    Take 1 tablet by mouth daily as needed.    Chronic rhinitis       fluocinolone 0.01 % solution    SYNALAR     Apply topically daily as needed        fluocinonide 0.05 % solution    LIDEX    60 mL    Apply topically 2 times daily To areas of itch on the scalp as needed.    Telogen effluvium       folic acid 1 MG tablet    FOLVITE     TK 1 T PO D        hydroxychloroquine 200 MG tablet    PLAQUENIL    180 tablet    Take 2 tablets (400 mg) by mouth daily    Inflammatory arthritis       insulin pen needle 32G X 4 MM    BD MARCK U/F    300 each    USE DAILY OR AS DIRECTED    Type 2 diabetes, HbA1c goal < 7% (H)       * ketoconazole 2 % shampoo    NIZORAL     Apply topically daily as needed        * ketoconazole 2 % cream    NIZORAL          lidocaine 5 % ointment    XYLOCAINE    50 g    Apply 1 g topically 2 times daily as needed for moderate pain    Fibromyalgia, Rheumatoid arthritis involving both wrists with positive rheumatoid factor (H)       lisinopril-hydrochlorothiazide 20-25 MG per tablet    PRINZIDE/ZESTORETIC    90 tablet    TAKE 1 TABLET BY MOUTH DAILY    HTN (hypertension)       Magnesium Oxide -Mg Supplement 250 MG Tabs      TK 1 T PO BID. REDUCE IF STOOLS LOOSEN        metFORMIN 500 MG 24 hr tablet    GLUCOPHAGE-XR    60 tablet    Take 2 tablets (1,000 mg) by mouth  daily (with dinner)    Type 2 diabetes mellitus not at goal (H)       * metroNIDAZOLE 1 % cream    NORITATE     Apply topically daily        * metroNIDAZOLE 0.75 % cream    METROCREAM     CECI EXT TO FACE BID        mometasone 50 MCG/ACT spray    NASONEX     Spray 2 sprays into both nostrils daily        montelukast 10 MG tablet    SINGULAIR    30 tablet    Take 1 tablet (10 mg) by mouth At Bedtime    Uncomplicated asthma, unspecified asthma severity       nitroGLYcerin 0.4 MG sublingual tablet    NITROSTAT    25 tablet    Place 1 tablet (0.4 mg) under the tongue every 5 minutes as needed for chest pain    NSTEMI (non-ST elevated myocardial infarction) (H)       nystatin 904307 UNITS Tabs tablet    MYCOSTATIN     TK 2 TS PO BID        olopatadine 0.1 % ophthalmic solution    PATANOL    5 mL    Place 1 drop into both eyes 2 times daily as needed for allergies.    Chronic rhinitis       pravastatin 40 MG tablet    PRAVACHOL    90 tablet    Take 1 tablet (40 mg) by mouth daily    CAD (coronary artery disease)       ranitidine 150 MG tablet    ZANTAC    60 tablet    Take 1 tablet (150 mg) by mouth 2 times daily    Gastritis       spironolactone 25 MG tablet    ALDACTONE    60 tablet    Take 2 tablets (50 mg) by mouth daily    Hypertension goal BP (blood pressure) < 140/90       sucralfate 1 GM tablet    CARAFATE    120 tablet    Take 1 tablet (1 g) by mouth 4 times daily as needed    Abdominal pain, epigastric       SYMBICORT 80-4.5 MCG/ACT Inhaler   Generic drug:  budesonide-formoterol      INHALE 2 PUFFS PO BID RINSE AND EXPECTORATE AFTER        TOPROL XL PO      Take 100 mg by mouth daily        traMADol 50 MG tablet    ULTRAM    60 tablet    Take 1 tablet (50 mg) by mouth every 8 hours as needed for moderate pain    Undifferentiated connective tissue disease (H)       TUMS 500 MG chewable tablet   Generic drug:  calcium carbonate      Take 3-4 chew tab by mouth daily as needed.        vitamin D 08038 UNIT capsule     ERGOCALCIFEROL    8 capsule    Take 1 capsule (50,000 Units) by mouth every 7 days Need a Vitamin D level in 2 months.    Vitamin D deficiency       ZOFRAN PO           * Notice:  This list has 10 medication(s) that are the same as other medications prescribed for you. Read the directions carefully, and ask your doctor or other care provider to review them with you.

## 2017-11-22 NOTE — LETTER
"2017     RE: Janine Cornell  [address] [facility] [address]     Dear Colleague,    Thank you for referring your patient, Janine Cornell, to the [facility] at [facility]. Please see a copy of my visit note below.    SUBJECTIVE   Janine Cornell is a 52 year old , Patient's last menstrual period was 2017., patient of fibroid clinic with multiple medical comorbidities who presents for acute on chronic irregular bleeding.    -- UFE for large fundal myoma 10/29/14 for HMB (2x month)  -- Return of 2x monthly HMB -2016, myoma stable  -- 2016 D&C (benign) and LNG-IUS placement    Pt states she had daily spotting for 7 months postop, did well with amenorrhea for many months and then ~? One month ago (pt couldn't really quantify days of bleeding) had return of bleeding - at times bright red, at times \"black strings\" to brown.  Spotting --> black strings --> \"crumbly\" --> \"goopey\".    Feels frustrated, wants to know what bleeding is from, why persists.    Past Medical History  Past Medical History:   Diagnosis Date     Abnormal glandular Papanicolaou smear of cervix      Allergic rhinitis     Allergy, airborne subst     Arthritis      ASCVD (arteriosclerotic cardiovascular disease)      Chronic pain      Chronic pancreatitis (H)     idiopathic, Tx: PPI, antioxidants, pancreatic enzymes     Common migraine      Coronary artery disease      Costochondritis      Costochondritis      Difficulty in walking(719.7)      Dyspnea on exertion      Ectasia, mammary duct     [facility], persistent nipple discharge     Elevated fasting glucose      Gastro-oesophageal reflux disease      Hernia      History of angina      Hyperlipidemia LDL goal < 100      Hypertension goal BP (blood pressure) < 140/90     Essential hypertension     Iron deficiency anemia      Ischemic cardiomyopathy      Menorrhagia      Migraine headaches      " Mild persistent asthma      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 Matias, neg w/u, Rx'd w/prednisone     Ventral hernia, unspecified, without mention of obstruction or gangrene 7/2012       Medications  Current Outpatient Prescriptions   Medication     ketoconazole (NIZORAL) 2 % cream     metroNIDAZOLE (METROCREAM) 0.75 % cream     folic acid (FOLVITE) 1 MG tablet     azelastine (ASTELIN) 0.1 % spray     SYMBICORT 80-4.5 MCG/ACT Inhaler     pravastatin (PRAVACHOL) 40 MG tablet     cyclobenzaprine (FLEXERIL) 10 MG tablet     hydroxychloroquine (PLAQUENIL) 200 MG tablet     spironolactone (ALDACTONE) 25 MG tablet     BASAGLAR 100 UNIT/ML injection     Metoprolol Succinate (TOPROL XL PO)     bacitracin ophthalmic ointment     COMPRESSION STOCKINGS     COMPRESSION STOCKINGS     insulin pen needle (BD MARCK U/F) 32G X 4 MM     insulin glargine (BASAGLAR KWIKPEN) 100 UNIT/ML injection     ranitidine (ZANTAC) 150 MG tablet     lidocaine (XYLOCAINE) 5 % ointment     traMADol (ULTRAM) 50 MG tablet     ferrous gluconate (FERGON) 324 (38 FE) MG tablet     blood glucose monitoring (ONE TOUCH DELICA) lancets     vitamin D (ERGOCALCIFEROL) 92180 UNIT capsule     clopidogrel (PLAVIX) 75 MG tablet     lisinopril-hydrochlorothiazide (PRINZIDE/ZESTORETIC) 20-25 MG per tablet     blood  "glucose monitoring (NO BRAND SPECIFIED) meter device kit     blood glucose monitoring (NO BRAND SPECIFIED) test strip     diphenhydrAMINE (BENADRYL) 25 MG capsule     EPIPEN 2-RIKY 0.3 MG/0.3ML injection     AEROSPAN 80 MCG/ACT AERS     Magnesium Oxide -Mg Supplement 250 MG TABS     nystatin (MYCOSTATIN) 674330 UNITS TABS tablet     albuterol (2.5 MG/3ML) 0.083% neb solution     dicyclomine (BENTYL) 20 MG tablet     sucralfate (CARAFATE) 1 GM tablet     fluocinolone (SYNALAR) 0.01 % solution     mometasone (NASONEX) 50 MCG/ACT spray     Ondansetron HCl (ZOFRAN PO)     metFORMIN (GLUCOPHAGE-XR) 500 MG 24 hr tablet     montelukast (SINGULAIR) 10 MG tablet     acetaminophen (TYLENOL) 325 MG tablet     metroNIDAZOLE (NORITATE) 1 % cream     desonide (DESOWEN) 0.05 % cream     ketoconazole (NIZORAL) 2 % shampoo     fluocinonide (LIDEX) 0.05 % external solution     nitroglycerin (NITROSTAT) 0.4 MG SL tablet     albuterol (PROAIR HFA, PROVENTIL HFA, VENTOLIN HFA) 108 (90 BASE) MCG/ACT inhaler     calcium carbonate (TUMS) 500 MG chewable tablet     fexofenadine (ALLEGRA) 180 MG tablet     olopatadine (PATANOL) 0.1 % ophthalmic solution     No current facility-administered medications for this visit.        Allergies  Allergies   Allergen Reactions     Amoxicillin-Pot Clavulanate      Augmentin      Unknown possible hives and edema     Azithromycin      Diatrizoate Other (See Comments)     Pt wants this listed because she is allergic to shellfish      Imitrex [Sumatriptan]      Severe face/neck/chest tightness and flushing side effects      Penicillins Hives     Unknown      Pork Allergy      Stomach pain, cramping, diarrhea, itching, nausea and headaches     Shellfish Allergy Hives and Swelling     Sulfa Drugs Hives and Swelling     Zithromax [Azithromycin Dihydrate] Swelling     Unknown      ROS:  See above in HPI    OBJECTIVE   /79  Pulse 64  Ht 1.6 m (5' 2.99\")  Wt 73.9 kg (163 lb)  LMP 11/06/2017  BMI 28.88 " "kg/m2  General:  Alert, no distress   Head:  Normocephalic, without obvious abnormality   Lungs:  Clear to auscultation bilaterally   Heart:  Regular rate and rhythm, no murmur   Abdomen:  Soft, non-tender, non-distended, bowel sounds normal   Pelvic: declined   Extremities:  normal       Results for JANINE CORNELL (MRN [id number]) as of 2017 13:07   Ref. Range 2017 17:24 11/15/2017 13:59   Hemoglobin Latest Ref Range: 11.7 - 15.7 g/dL 13.9 12.3     TVUS today: \"IUD in place. Possible polyp in endometrial cavity, though view limited by IUD. Fibroid does not appear to be impinging on uterine cavity.\"     ASSESSMENT   Janine Cornell is a 52 year old , irregular bleeding.    PLAN     -- reviewed possible etiologies (myoma, seems less likely, stable/decreased in size) (HPO axis, LNG-IUS, possible polyp on ultrasound today.  Overall, bleeding most recently doesn't sound from history (or hgb review) to be excessively heavy although it seems like amenorrhea is the patient's primary goal because she has \"enough other medical problems to be dealing with this too\".  -- pt offered but declined office sampling today  -- reviewed mgmt options, pt would like to proceed with hysteroscopy/possible polypectomy/possible LNG-IUD removal and replacement   -- further mgmt depending on hysteroscopy results     Total visit time was 25 minutes with >50% time spent in counseling and coordination of care for AUB-L/O/I/P.  Jacqui Ureña MD MPH    Again, thank you for allowing me to participate in the care of your patient.      Sincerely,    Cristy Ureña MD      "

## 2017-12-08 ENCOUNTER — TELEPHONE (OUTPATIENT)
Dept: OBGYN | Facility: CLINIC | Age: 52
End: 2017-12-08

## 2017-12-13 ENCOUNTER — PRE VISIT (OUTPATIENT)
Dept: CARDIOLOGY | Facility: CLINIC | Age: 52
End: 2017-12-13

## 2017-12-13 DIAGNOSIS — E78.5 HYPERLIPIDEMIA ASSOCIATED WITH TYPE 2 DIABETES MELLITUS (H): Primary | ICD-10-CM

## 2017-12-13 DIAGNOSIS — E11.69 HYPERLIPIDEMIA ASSOCIATED WITH TYPE 2 DIABETES MELLITUS (H): Primary | ICD-10-CM

## 2017-12-20 ENCOUNTER — OFFICE VISIT (OUTPATIENT)
Dept: CARDIOLOGY | Facility: CLINIC | Age: 52
End: 2017-12-20
Attending: INTERNAL MEDICINE
Payer: COMMERCIAL

## 2017-12-20 VITALS
HEIGHT: 63 IN | WEIGHT: 172.5 LBS | BODY MASS INDEX: 30.56 KG/M2 | DIASTOLIC BLOOD PRESSURE: 85 MMHG | OXYGEN SATURATION: 100 % | SYSTOLIC BLOOD PRESSURE: 126 MMHG | HEART RATE: 63 BPM

## 2017-12-20 DIAGNOSIS — E11.69 HYPERLIPIDEMIA ASSOCIATED WITH TYPE 2 DIABETES MELLITUS (H): ICD-10-CM

## 2017-12-20 DIAGNOSIS — I25.10 CORONARY ARTERY DISEASE INVOLVING NATIVE HEART WITHOUT ANGINA PECTORIS, UNSPECIFIED VESSEL OR LESION TYPE: ICD-10-CM

## 2017-12-20 DIAGNOSIS — E78.5 HYPERLIPIDEMIA ASSOCIATED WITH TYPE 2 DIABETES MELLITUS (H): ICD-10-CM

## 2017-12-20 DIAGNOSIS — E78.5 HYPERLIPIDEMIA LDL GOAL <70: ICD-10-CM

## 2017-12-20 DIAGNOSIS — I10 HYPERTENSION, UNSPECIFIED TYPE: ICD-10-CM

## 2017-12-20 DIAGNOSIS — I21.4 NSTEMI (NON-ST ELEVATED MYOCARDIAL INFARCTION) (H): ICD-10-CM

## 2017-12-20 DIAGNOSIS — I10 HYPERTENSION GOAL BP (BLOOD PRESSURE) < 140/90: Primary | ICD-10-CM

## 2017-12-20 LAB
ALBUMIN SERPL-MCNC: 3.7 G/DL (ref 3.4–5)
ALP SERPL-CCNC: 67 U/L (ref 40–150)
ALT SERPL W P-5'-P-CCNC: 23 U/L (ref 0–50)
ANION GAP SERPL CALCULATED.3IONS-SCNC: 8 MMOL/L (ref 3–14)
AST SERPL W P-5'-P-CCNC: 18 U/L (ref 0–45)
BILIRUB SERPL-MCNC: 0.3 MG/DL (ref 0.2–1.3)
BUN SERPL-MCNC: 17 MG/DL (ref 7–30)
CALCIUM SERPL-MCNC: 8.6 MG/DL (ref 8.5–10.1)
CHLORIDE SERPL-SCNC: 102 MMOL/L (ref 94–109)
CHOLEST SERPL-MCNC: 138 MG/DL
CO2 SERPL-SCNC: 26 MMOL/L (ref 20–32)
CREAT SERPL-MCNC: 0.96 MG/DL (ref 0.52–1.04)
GFR SERPL CREATININE-BSD FRML MDRD: 61 ML/MIN/1.7M2
GLUCOSE SERPL-MCNC: 166 MG/DL (ref 70–99)
HDLC SERPL-MCNC: 41 MG/DL
LDLC SERPL CALC-MCNC: 56 MG/DL
NONHDLC SERPL-MCNC: 96 MG/DL
POTASSIUM SERPL-SCNC: 3.5 MMOL/L (ref 3.4–5.3)
PROT SERPL-MCNC: 7.1 G/DL (ref 6.8–8.8)
SODIUM SERPL-SCNC: 136 MMOL/L (ref 133–144)
TRIGL SERPL-MCNC: 205 MG/DL

## 2017-12-20 PROCEDURE — 99212 OFFICE O/P EST SF 10 MIN: CPT | Mod: ZF

## 2017-12-20 PROCEDURE — 80061 LIPID PANEL: CPT | Performed by: INTERNAL MEDICINE

## 2017-12-20 PROCEDURE — 80053 COMPREHEN METABOLIC PANEL: CPT | Performed by: INTERNAL MEDICINE

## 2017-12-20 PROCEDURE — 36415 COLL VENOUS BLD VENIPUNCTURE: CPT | Performed by: INTERNAL MEDICINE

## 2017-12-20 PROCEDURE — 99214 OFFICE O/P EST MOD 30 MIN: CPT | Mod: ZP | Performed by: INTERNAL MEDICINE

## 2017-12-20 RX ORDER — NITROGLYCERIN 0.4 MG/1
0.4 TABLET SUBLINGUAL EVERY 5 MIN PRN
Qty: 25 TABLET | Refills: 1 | Status: SHIPPED | OUTPATIENT
Start: 2017-12-20 | End: 2019-09-30

## 2017-12-20 RX ORDER — SPIRONOLACTONE 50 MG/1
50 TABLET, FILM COATED ORAL DAILY
Qty: 45 TABLET | Refills: 11 | Status: SHIPPED | OUTPATIENT
Start: 2017-12-20 | End: 2018-12-26

## 2017-12-20 RX ORDER — PRAVASTATIN SODIUM 40 MG
40 TABLET ORAL DAILY
Qty: 30 TABLET | Refills: 11 | Status: SHIPPED | OUTPATIENT
Start: 2017-12-20 | End: 2018-12-26

## 2017-12-20 RX ORDER — LISINOPRIL AND HYDROCHLOROTHIAZIDE 20; 25 MG/1; MG/1
1 TABLET ORAL DAILY
Qty: 30 TABLET | Refills: 11 | Status: SHIPPED | OUTPATIENT
Start: 2017-12-20 | End: 2018-12-24

## 2017-12-20 RX ORDER — CLOPIDOGREL BISULFATE 75 MG/1
75 TABLET ORAL DAILY
Qty: 30 TABLET | Refills: 11 | Status: SHIPPED | OUTPATIENT
Start: 2017-12-20 | End: 2018-12-24

## 2017-12-20 ASSESSMENT — PAIN SCALES - GENERAL: PAINLEVEL: NO PAIN (0)

## 2017-12-20 NOTE — LETTER
12/20/2017      RE: Janine Cornell  [address] [facility] [address]       Dear Colleague,    Thank you for the opportunity to participate in the care of your patient, Janine Cornell, at the [facility] HEART [facility]. Please see a copy of my visit note below.    HPI:     Ms Janine Cornell, who is a 52 yr -American patient with DM2, Hypothyroidism, PCOS, Dyslipidemia, HTN, and S/PNSTEMI (11/2011) s/p stent placement comes for follow-up.     Patient has a positive RA factor and fibromyalgia.  Patient feels relatively well.  She complaints much less about chest pain and SOB. She denies palpitations.    From cardiovascular point of view - patient feels relatively well and is very happy for this.      PAST MEDICAL HISTORY:  Past Medical History:   Diagnosis Date     Abnormal glandular Papanicolaou smear of cervix 1992     Allergic rhinitis     Allergy, airborne subst     Arthritis      ASCVD (arteriosclerotic cardiovascular disease)      Chronic pain      Chronic pancreatitis (H)     idiopathic, Tx: PPI, antioxidants, pancreatic enzymes     Common migraine      Coronary artery disease      Costochondritis      Costochondritis      Difficulty in walking(719.7)      Dyspnea on exertion      Ectasia, mammary duct     [facility] by [facility], persistent nipple discharge     Elevated fasting glucose      Gastro-oesophageal reflux disease      Hernia      History of angina      Hyperlipidemia LDL goal < 100      Hypertension goal BP (blood pressure) < 140/90     Essential hypertension     Iron deficiency anemia      Ischemic cardiomyopathy      Menorrhagia      Migraine headaches      Mild persistent asthma      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 Matias, neg w/u, Rx'd w/prednisone     Ventral hernia, unspecified, without mention of obstruction or gangrene 7/2012       CURRENT MEDICATIONS:  Current Outpatient Prescriptions   Medication Sig Dispense Refill     insulin glargine (LANTUS) 100 UNIT/ML injection Inject 40 Units Subcutaneous daily (with breakfast)       ketoconazole (NIZORAL) 2 % cream   3     folic acid (FOLVITE) 1 MG tablet TK 1 T PO D  2     SYMBICORT 80-4.5 MCG/ACT Inhaler INHALE 2 PUFFS PO BID RINSE AND EXPECTORATE AFTER  3     pravastatin (PRAVACHOL) 40 MG tablet Take 1 tablet (40 mg) by mouth daily 90 tablet 0     cyclobenzaprine (FLEXERIL) 10 MG tablet Take 1 tablet (10 mg) by mouth 2 times daily as needed for muscle spasms 180 tablet 0     hydroxychloroquine (PLAQUENIL) 200 MG tablet Take 2 tablets (400 mg) by mouth daily 180 tablet 1     spironolactone (ALDACTONE) 25 MG tablet Take 2 tablets (50 mg) by mouth daily 60 tablet 11     Metoprolol Succinate (TOPROL XL PO) Take 100 mg by mouth daily       insulin pen needle (BD MARCK U/F) 32G X 4 MM USE DAILY OR AS DIRECTED 300 each 3     ranitidine (ZANTAC) 150 MG tablet Take 1 tablet (150 mg) by mouth 2 times daily 60 tablet 2     traMADol (ULTRAM) 50 MG tablet Take 1 tablet (50 mg) by mouth every 8 hours as needed for moderate pain 60 tablet 3     ferrous gluconate (FERGON) 324 (38 FE) MG tablet Take 1 tablet (324 mg) by mouth 2 times daily (Patient taking differently: Take 1 tablet by mouth daily ) 180 tablet 1     vitamin D (ERGOCALCIFEROL) 49183 UNIT  capsule Take 1 capsule (50,000 Units) by mouth every 7 days Need a Vitamin D level in 2 months. (Patient taking differently: Take 50,000 Units by mouth every 7 days Need a Vitamin D level in 2 months.) 8 capsule 0     clopidogrel (PLAVIX) 75 MG tablet Take 1 tablet (75 mg) by mouth daily 90 tablet 3     lisinopril-hydrochlorothiazide (PRINZIDE/ZESTORETIC) 20-25 MG per tablet TAKE 1 TABLET BY MOUTH DAILY 90 tablet 3     diphenhydrAMINE (BENADRYL) 25 MG capsule TK 1 TO 2 CS PO QHS  4     EPIPEN 2-RIKY 0.3 MG/0.3ML injection INJECT 0.3 MG INTO THE MUSCLE PRF ANAPHYALAXIS  0     AEROSPAN 80 MCG/ACT AERS INHALE 2 PUFFS PO BID.  RINSE MOUTH AFTERWARDS  4     Magnesium Oxide -Mg Supplement 250 MG TABS TK 1 T PO BID. REDUCE IF STOOLS LOOSEN  11     nystatin (MYCOSTATIN) 193298 UNITS TABS tablet TK 2 TS PO BID  0     albuterol (2.5 MG/3ML) 0.083% neb solution INL 1 VIAL VIA NEBULIZATION Q 4 TO 6 HOURS PRN  1     dicyclomine (BENTYL) 20 MG tablet Take 1 tablet (20 mg) by mouth 4 times daily as needed 60 tablet 5     sucralfate (CARAFATE) 1 GM tablet Take 1 tablet (1 g) by mouth 4 times daily as needed 120 tablet 3     fluocinolone (SYNALAR) 0.01 % solution Apply topically daily as needed       mometasone (NASONEX) 50 MCG/ACT spray Spray 2 sprays into both nostrils daily       Ondansetron HCl (ZOFRAN PO)        metFORMIN (GLUCOPHAGE-XR) 500 MG 24 hr tablet Take 2 tablets (1,000 mg) by mouth daily (with dinner) 60 tablet 11     montelukast (SINGULAIR) 10 MG tablet Take 1 tablet (10 mg) by mouth At Bedtime 30 tablet 1     acetaminophen (TYLENOL) 325 MG tablet Take 1-2 tablets (325-650 mg) by mouth every 6 hours as needed for pain (headache) 250 tablet 0     metroNIDAZOLE (NORITATE) 1 % cream Apply topically daily       desonide (DESOWEN) 0.05 % cream Apply topically 2 times daily       ketoconazole (NIZORAL) 2 % shampoo Apply topically daily as needed       fluocinonide (LIDEX) 0.05 % external solution Apply topically 2 times  daily To areas of itch on the scalp as needed. 60 mL 11     nitroglycerin (NITROSTAT) 0.4 MG SL tablet Place 1 tablet (0.4 mg) under the tongue every 5 minutes as needed for chest pain 25 tablet 1     albuterol (PROAIR HFA, PROVENTIL HFA, VENTOLIN HFA) 108 (90 BASE) MCG/ACT inhaler Inhale 2 puffs into the lungs every 6 hours as needed for shortness of breath / dyspnea or wheezing 3 Inhaler 1     calcium carbonate (TUMS) 500 MG chewable tablet Take 3-4 chew tab by mouth daily as needed.       fexofenadine (ALLEGRA) 180 MG tablet Take 1 tablet by mouth daily as needed. 90 tablet 3     olopatadine (PATANOL) 0.1 % ophthalmic solution Place 1 drop into both eyes 2 times daily as needed for allergies. 5 mL 12     metroNIDAZOLE (METROCREAM) 0.75 % cream CECI EXT TO FACE BID  2     azelastine (ASTELIN) 0.1 % spray U 2 SPRAYS IEN BID  0     BASAGLAR 100 UNIT/ML injection Inject 40 Units Subcutaneous daily (Patient not taking: Reported on 12/20/2017) 12 mL 2     bacitracin ophthalmic ointment Apply to incision line three times daily. (Patient not taking: Reported on 12/20/2017) 3.5 g 0     COMPRESSION STOCKINGS 2 each daily (Patient not taking: Reported on 12/20/2017) 4 each 2     COMPRESSION STOCKINGS 2 each daily Apply one 10-15 mmHg compression stocking to each lower extgmierty during the day and remove before bedtime. (Patient not taking: Reported on 12/20/2017) 4 each 2     insulin glargine (BASAGLAR KWIKPEN) 100 UNIT/ML injection Inject 40 Units Subcutaneous daily (Patient not taking: Reported on 12/20/2017) 18 mL 3     lidocaine (XYLOCAINE) 5 % ointment Apply 1 g topically 2 times daily as needed for moderate pain (Patient not taking: Reported on 12/20/2017) 50 g 5     blood glucose monitoring (ONE TOUCH DELICA) lancets Use to test blood sugars 4 times daily or as directed. (Patient not taking: Reported on 12/20/2017) 4 Box 3     blood glucose monitoring (NO BRAND SPECIFIED) meter device kit Use to test blood sugar 4 X  times daily or as directed. (Patient not taking: Reported on 2017) 1 kit 0     blood glucose monitoring (NO BRAND SPECIFIED) test strip 1 strip by In Vitro route 4 times daily Test as directed. Please dispense three months and three months of refills. Thank you. (Patient not taking: Reported on 2017) 360 each 3       PAST SURGICAL HISTORY:  Past Surgical History:   Procedure Laterality Date     C ECHO HEART XTHORACIC,COMPLETE, W/O DOPPLER  04    [facility] WNL, EF 60%     C/SECTION, LOW TRANSVERSE           CARDIAC SURGERY      cardiac stent- recent in 16; 4 other stents     DILATION AND CURETTAGE N/A 2016    Procedure: DILATION AND CURETTAGE;  Surgeon: Nahed Butler MD;  Location: [facility] OR     HC UGI ENDOSCOPY W EUS  08    Dr. Pastrana, possible chronic pancreatitis, fatty liver     HERNIA REPAIR  2012    done at [facility]     INSERT INTRAUTERINE DEVICE N/A 2016    Procedure: INSERT INTRAUTERINE DEVICE;  Surgeon: Nahed Butler MD;  Location: [facility] OR     INT UTERINE FIBRIOD EMBOLIZATION  10/29/2014     LAPAROSCOPIC CHOLECYSTECTOMY  08    Dr. Arnol GRUBBS TX, CERVICAL      s/p LEEP     ORBITOTOMY Right 3/15/2016    Procedure: ORBITOTOMY;  Surgeon: Myron Cyr MD;  Location: [facility]     ORBITOTOMY Right 2017    Procedure: ORBITOTOMY;  RIGHT ORBITOTOMY AND BIOPSY;  Surgeon: Charis Holbrook MD;  Location: [facility]     REPAIR PTOSIS Right 2017    Procedure: REPAIR PTOSIS;  RIGHT UPPER LID PTOSIS REPAIR;  Surgeon: Myron Cyr MD;  Location: [facility]     UPPER GI ENDOSCOPY  10/21/08    mild gastritis, Dr. Hidalgo       ALLERGIES     Allergies   Allergen Reactions     Amoxicillin-Pot Clavulanate      Augmentin      Unknown possible hives and edema     Azithromycin      Diatrizoate Other (See Comments)     Pt wants this listed because she is allergic to shellfish      Imitrex [Sumatriptan]      Severe face/neck/chest tightness and flushing  side effects      Penicillins Hives     Unknown      Pork Allergy      Stomach pain, cramping, diarrhea, itching, nausea and headaches     Shellfish Allergy Hives and Swelling     Sulfa Drugs Hives and Swelling     Zithromax [Azithromycin Dihydrate] Swelling     Unknown        FAMILY HISTORY:  Family History   Problem Relation Age of Onset     HEART DISEASE Father 50     heart attack     CEREBROVASCULAR DISEASE Father      DIABETES Father      Hypertension Father      Depression Father      C.A.D. Father      Hypertension Mother      Arthritis Mother      HEART DISEASE Mother      long qt syndrome     Thyroid Disease Mother      C.A.D. Mother      HEART DISEASE Brother 15     MI at 15, 16.      DIABETES Maternal Uncle      Hypertension Maternal Aunt      Hypertension Maternal Uncle      Arthritis Brother      he passed away, had severe arthritis at age 11     Arthritis Maternal Uncle      Eye Disorder Maternal Uncle      cataracts     Neurologic Disorder Sister      migraines     Neurologic Disorder Sister      migraines     Respiratory Son      asthma     CEREBROVASCULAR DISEASE Maternal Uncle      C.A.D. Brother      Family History Negative Other      neg for RA, SLE     Unknown/Adopted No family hx of      unknown neurological issues in her family, mother was adopted     Skin Cancer No family hx of      No known family hx of skin cancer       SOCIAL HISTORY:  Social History     Social History     Marital status: Single     Spouse name: N/A     Number of children: 1     Years of education: N/A     Occupational History      None      Social History Main Topics     Smoking status: Former Smoker     Packs/day: 0.20     Years: 1.00     Types: Cigarettes     Quit date: 2016     Smokeless tobacco: Never Used     Alcohol use No     Drug use: No     Sexual activity: Not Currently     Other Topics Concern     Parent/Sibling W/ Cabg, Mi Or Angioplasty Before 65f 55m? Yes     Social History Narrative    Balanced  "Diet - sometimes    Osteoporosis Prevention Measures - Dairy servings per day: 2 servings weekly    Regular Exercise -  Yes Describe walking 4 times a week    Dental Exam - NO    Seatbelts used - Yes    Self Breast Exam - Yes    Abuse: Current or Past (Physical, Sexual or Emotional)- No    Do you have any concerns about STD's -  No    Do you feel safe in your environment - No    Guns stored in the home - No    Sunscreen used - Yes    Lipids -  YES - Date: 053102    Glucose -  YES - Date: 012804    Eye Exam - YES - Date: one year ago    Colon Cancer Screening - No    Hemoccults - NO    Pap Test -  YES - Date: 070904, remote history of LEEP    Mammography - YES - Date: last spring, would like to discuss, needs a referral to [facility]    DEXA - NO    Immunizations reviewed and up to date - Yes, last td given in 1997 or 1998       ROS:   Constitutional: No fever, chills, or sweats. No weight gain/loss   ENT: No visual disturbance, ear ache, epistaxis, sore throat  Allergies/Immunologic: Negative.   Respiratory: No cough, hemoptysia  Cardiovascular: As per HPI  GI: No nausea, vomiting, hematemesis, melena, or hematochezia  : No urinary frequency, dysuria, or hematuria  Integument: Negative  Psychiatric: Negative  Neuro: Negative  Endocrinology: Negative   Musculoskeletal: Negative    EXAM:  /85 (BP Location: Left arm, Patient Position: Chair, Cuff Size: Adult Large)  Pulse 63  Ht 1.6 m (5' 3\")  Wt 78.2 kg (172 lb 8 oz)  SpO2 100%  BMI 30.56 kg/m2  In general, the patient is a pleasant female in no apparent distress.    HEENT: NC/AT.  PERRLA.  EOMI.  Sclerae white, not injected.  Nares clear.  Pharynx without erythema or exudate.  Dentition intact.    Neck: No adenopathy.  No thyromegaly. Carotids +4/4 bilaterally without bruits.  No jugular venous distension.   Heart: RRR. Normal S1, S2 splits physiologically. No murmur, rub, click, or gallop. The PMI is in the 5th ICS in the midclavicular " line. There is no heave.    Lungs: CTA.  No ronchi, wheezes, rales.  No dullness to percussion.   Abdomen: Soft, nontender, nondistended. No organomegaly.  No bruits.   Extremities: No clubbing, cyanosis, or edema.  The pulses are +4/4 at the radial, brachial, femoral, popliteal, DP, and PT sites bilaterally.  No bruits are noted.  Neurologic: Alert and oriented to person/place/time, normal speech, gait and affect  Skin: No petechiae, purpura or rash.    Labs:       Ref. Range 12/20/2017 15:39   Sodium Latest Ref Range: 133 - 144 mmol/L 136   Potassium Latest Ref Range: 3.4 - 5.3 mmol/L 3.5   Chloride Latest Ref Range: 94 - 109 mmol/L 102   Carbon Dioxide Latest Ref Range: 20 - 32 mmol/L 26   Urea Nitrogen Latest Ref Range: 7 - 30 mg/dL 17   Creatinine Latest Ref Range: 0.52 - 1.04 mg/dL 0.96   GFR Estimate Latest Ref Range: >60 mL/min/1.7m2 61   GFR Estimate If Black Latest Ref Range: >60 mL/min/1.7m2 74   Calcium Latest Ref Range: 8.5 - 10.1 mg/dL 8.6   Anion Gap Latest Ref Range: 3 - 14 mmol/L 8   Albumin Latest Ref Range: 3.4 - 5.0 g/dL 3.7   Protein Total Latest Ref Range: 6.8 - 8.8 g/dL 7.1   Bilirubin Total Latest Ref Range: 0.2 - 1.3 mg/dL 0.3   Alkaline Phosphatase Latest Ref Range: 40 - 150 U/L 67   ALT Latest Ref Range: 0 - 50 U/L 23   AST Latest Ref Range: 0 - 45 U/L 18   Cholesterol Latest Ref Range: <200 mg/dL 138   HDL Cholesterol Latest Ref Range: >49 mg/dL 41 (L)   LDL Cholesterol Calculated Latest Ref Range: <100 mg/dL 56   Non HDL Cholesterol Latest Ref Range: <130 mg/dL 96   Triglycerides Latest Ref Range: <150 mg/dL 205 (H)   Glucose Latest Ref Range: 70 - 99 mg/dL 166 (H)            Assessment and Plan:     We discussed results with patient- we re-emphasized the importance of a heart healthy diet and lifestyle.  Lipids are well controlled - except elevated triglycerides which goes together with a less controlled diabetes.  We discussed with her to pay special attention to improve her diabetes  diet.    We continue with same medical regimen - weasked patient o check her dosage of metoprolol what she was taking.  Follow-up within 6 months.      Milan Peace MD, PhD  Professor of Medicine  Division of Cardiology        CC  Patient Care Team:  Kash Solano MD as PCP - General (Family Practice)  Milan Peace MD as MD (Cardiology)  Lauren Francis RN as Nurse Coordinator (Cardiology)  Majo Mckinnon MD as MD (Rheumatology)  Jas Vernon MD as MD (Ophthalmology)  Jas Vernon MD as Referring Physician (Ophthalmology)  Charis Holbrook MD as Resident (Ophthalmology)  DARLING THOMPSON

## 2017-12-20 NOTE — NURSING NOTE
Chief Complaint   Patient presents with     Follow Up For     6 month follow up for CAD, HTN and hyperlipidemia      Vitals were taken and medications were reconciled.  Fay Ford MA    4:10 PM

## 2017-12-20 NOTE — PATIENT INSTRUCTIONS
Please call GEORGIE Cantrell to verify the Metoprolol dosage at 11:30 am.  Press option 1 then option 3 to speak with a nurse.      Return to clinic in 6 months with an echocardiogram.  Fasting labs will be needed prior to this appointment.      Please do not hesitate to call if you have any cardiology related questions or concerns, or need to schedule an appointment, at [phone].         Cardiology Medication Refill Request Information:  * Please contact your pharmacy regarding ANY request for medication refills.  ** [facility] Prescription Fax = [phone]  * Please allow 3 business days for routine medication refills.    Cardiology Test Result notification information:  *You will be notified with in 7-10 days of your appointment day regarding the results of your test. If you are on MyChart you will be notified as soon as the provider has reviewed the results and signed off on them. Please call RN Care Coordinator with questions regarding results.

## 2017-12-20 NOTE — MR AVS SNAPSHOT
After Visit Summary   12/20/2017    Janine Cornell    MRN: [id number]           Patient Information     Date Of Birth          1965        Visit Information        Provider Department      12/20/2017 4:00 PM Milan Peace MD [facility]        Today's Diagnoses     Hypertension goal BP (blood pressure) < 140/90    -  1    NSTEMI (non-ST elevated myocardial infarction) (H)        Hyperlipidemia LDL goal <70        CAD (coronary artery disease)        Hypertension, unspecified type        Coronary artery disease involving native heart without angina pectoris, unspecified vessel or lesion type          Care Instructions    Please call GEORGIE Cantrell to verify the Metoprolol dosage at 11:30 am.  Press option 1 then option 3 to speak with a nurse.      Return to clinic in 6 months with an echocardiogram.  Fasting labs will be needed prior to this appointment.      Please do not hesitate to call if you have any cardiology related questions or concerns, or need to schedule an appointment, at [phone].         Cardiology Medication Refill Request Information:  * Please contact your pharmacy regarding ANY request for medication refills.  ** [facility] Prescription Fax = [phone]  * Please allow 3 business days for routine medication refills.    Cardiology Test Result notification information:  *You will be notified with in 7-10 days of your appointment day regarding the results of your test. If you are on MyChart you will be notified as soon as the provider has reviewed the results and signed off on them. Please call RN Care Coordinator with questions regarding results.              Follow-ups after your visit        Additional Services     Follow-Up with Cardiologist                 Your next 10 appointments already scheduled     Feb 14, 2018  2:05 PM CST   (Arrive by 1:50 PM)   Return Visit with Kash Solano MD   [facility] ([facility])    [address] [place] [address]  [address] [id number] [phone]            Jul 17, 2018 12:00 PM CDT   LAB with  LAB   [facility] [place] ([facility])    [address] [place] [id number] [phone]           Please do not eat 10-12 hours before your appointment if you are coming in fasting for labs on lipids, cholesterol, or glucose (sugar). This does not apply to pregnant women. Water, hot tea and black coffee (with nothing added) are okay. Do not drink other fluids, diet soda or chew gum.            Jul 18, 2018  2:30 PM CDT   Ech Complete with AMADOR   [facility] ([facility])    [address] [place] [id number] [phone]           1. Please bring or wear a comfortable two-piece outfit. 2. You may eat, drink and take your normal medicines. 3. For any questions that cannot be answered, please contact the ordering physician            Jul 18, 2018  3:30 PM CDT   (Arrive by 3:15 PM)   Return Visit with Milan Peace MD   [facility] ([facility])    [address] [place] [id number] [phone]              Future tests that were ordered for you today     Open Future Orders        Priority Expected Expires Ordered    Follow-Up with Cardiologist Routine 7/3/2018 12/21/2018 12/20/2017    Comprehensive metabolic panel Routine 7/3/2018 12/21/2018 12/20/2017    Lipid panel reflex to direct LDL Fasting Routine 7/3/2018 12/21/2018 12/20/2017    Echo Complete Routine 7/3/2018 12/21/2018 12/20/2017            Who to contact     If you have questions or need follow up information about today's clinic visit or your schedule please contact [facility] directly at [phone].  Normal or non-critical lab and imaging results will be communicated to you by MyChart, letter or phone within 4 business days after the clinic has received the results. If you do  "not hear from us within 7 days, please contact the clinic through [employer] or phone. If you have a critical or abnormal lab result, we will notify you by phone as soon as possible.  Submit refill requests through [employer] or call your pharmacy and they will forward the refill request to us. Please allow 3 business days for your refill to be completed.          Additional Information About Your Visit        [employer] Information     [employer] gives you secure access to your electronic health record. If you see a primary care provider, you can also send messages to your care team and make appointments. If you have questions, please call your primary care clinic.  If you do not have a primary care provider, please call [phone] and they will assist you.        Care EveryWhere ID     This is your Care EveryWhere ID. This could be used by other organizations to access your [place] medical records  [id number]        Your Vitals Were     Pulse Height Pulse Oximetry BMI (Body Mass Index)          63 1.6 m (5' 3\") 100% 30.56 kg/m2         Blood Pressure from Last 3 Encounters:   12/20/17 126/85   11/22/17 118/79   11/17/17 (P) 104/62    Weight from Last 3 Encounters:   12/20/17 78.2 kg (172 lb 8 oz)   11/22/17 73.9 kg (163 lb)   11/15/17 73.2 kg (161 lb 6.4 oz)                 Today's Medication Changes          These changes are accurate as of: 12/20/17  5:28 PM.  If you have any questions, ask your nurse or doctor.               These medicines have changed or have updated prescriptions.        Dose/Directions    ferrous gluconate 324 (38 FE) MG tablet   Commonly known as:  FERGON   This may have changed:  when to take this   Used for:  AILEEN (iron deficiency anemia)        Dose:  1 tablet   Take 1 tablet (324 mg) by mouth 2 times daily   Quantity:  180 tablet   Refills:  1       lisinopril-hydrochlorothiazide 20-25 MG per tablet   Commonly known as:  PRINZIDE/ZESTORETIC   This may have changed:  See the new instructions. "   Used for:  Hypertension, unspecified type   Changed by:  Milan Peace MD        Dose:  1 tablet   Take 1 tablet by mouth daily   Quantity:  30 tablet   Refills:  11       spironolactone 50 MG tablet   Commonly known as:  ALDACTONE   This may have changed:    - medication strength  - additional instructions   Used for:  Hypertension goal BP (blood pressure) < 140/90   Changed by:  Milan Peace MD        Dose:  50 mg   Take 1 tablet (50 mg) by mouth daily . Take additional 0.5 tablets by mouth once daily as needed for weight gain.   Quantity:  45 tablet   Refills:  11       vitamin D 27128 UNIT capsule   Commonly known as:  ERGOCALCIFEROL   This may have changed:  additional instructions   Used for:  Vitamin D deficiency        Dose:  92755 Units   Take 1 capsule (50,000 Units) by mouth every 7 days Need a Vitamin D level in 2 months.   Quantity:  8 capsule   Refills:  0            Where to get your medicines      These medications were sent to [facility] [address] [place] [id number]     Phone:  [phone]     clopidogrel 75 MG tablet    lisinopril-hydrochlorothiazide 20-25 MG per tablet    nitroGLYcerin 0.4 MG sublingual tablet    pravastatin 40 MG tablet    spironolactone 50 MG tablet                Primary Care Provider    Lauren Francis RN       No address on file        Equal Access to Services     [facility] AH: Hadii frederick harkinso Sojunaid, waaxda luqadaha, qaybta kaalmada adeegyada, gagandeep glasgow haykei phillip . So [facility] [phone].    ATENCIÓN: Si habla español, tiene a burch disposición servicios gratuitos de asistencia lingüística. Llame al [phone].    We comply with applicable federal civil rights laws and [place] laws. We do not discriminate on the basis of race, color, national origin, age, disability, sex, sexual orientation, or gender identity.            Thank  you!     Thank you for choosing [facility]  for your care. Our goal is always to provide you with excellent care. Hearing back from our patients is one way we can continue to improve our services. Please take a few minutes to complete the written survey that you may receive in the mail after your visit with us. Thank you!             Your Updated Medication List - Protect others around you: Learn how to safely use, store and throw away your medicines at www.disposemymeds.org.          This list is accurate as of: 12/20/17  5:28 PM.  Always use your most recent med list.                   Brand Name Dispense Instructions for use Diagnosis    acetaminophen 325 MG tablet    TYLENOL    250 tablet    Take 1-2 tablets (325-650 mg) by mouth every 6 hours as needed for pain (headache)    Other chronic pain, Headache(784.0)       AEROSPAN 80 MCG/ACT Aers oral inhaler   Generic drug:  flunisolide HFA      INHALE 2 PUFFS PO BID.  RINSE MOUTH AFTERWARDS        * albuterol 108 (90 BASE) MCG/ACT Inhaler    PROAIR HFA/PROVENTIL HFA/VENTOLIN HFA    3 Inhaler    Inhale 2 puffs into the lungs every 6 hours as needed for shortness of breath / dyspnea or wheezing        * albuterol (2.5 MG/3ML) 0.083% neb solution      INL 1 VIAL VIA NEBULIZATION Q 4 TO 6 HOURS PRN        azelastine 0.1 % spray    ASTELIN     U 2 SPRAYS IEN BID        bacitracin ophthalmic ointment     3.5 g    Apply to incision line three times daily.    Postoperative eye state       * insulin glargine 100 UNIT/ML injection    LANTUS     Inject 40 Units Subcutaneous daily (with breakfast)        * BASAGLAR 100 UNIT/ML injection     18 mL    Inject 40 Units Subcutaneous daily    Type 2 diabetes mellitus without complication (H)       * BASAGLAR 100 UNIT/ML injection     12 mL    Inject 40 Units Subcutaneous daily    Type 2 diabetes mellitus without complication (H)       blood glucose monitoring lancets     4 Box    Use to test blood sugars 4 times daily or as  directed.    Type 2 diabetes, HbA1c goal < 7% (H)       blood glucose monitoring meter device kit    no brand specified    1 kit    Use to test blood sugar 4 X times daily or as directed.    Type 2 diabetes, HbA1c goal < 7% (H)       blood glucose monitoring test strip    no brand specified    360 each    1 strip by In Vitro route 4 times daily Test as directed. Please dispense three months and three months of refills. Thank you.    Type 2 diabetes, HbA1c goal < 7% (H)       clopidogrel 75 MG tablet    PLAVIX    30 tablet    Take 1 tablet (75 mg) by mouth daily    CAD (coronary artery disease)       * COMPRESSION STOCKINGS     4 each    2 each daily    Bilateral lower extremity edema, Venous incompetence       * COMPRESSION STOCKINGS     4 each    2 each daily Apply one 10-15 mmHg compression stocking to each lower extgmierty during the day and remove before bedtime.    Venous incompetence, Bilateral lower extremity edema       cyclobenzaprine 10 MG tablet    FLEXERIL    180 tablet    Take 1 tablet (10 mg) by mouth 2 times daily as needed for muscle spasms    Fibromyalgia       desonide 0.05 % cream    DESOWEN     Apply topically 2 times daily        dicyclomine 20 MG tablet    BENTYL    60 tablet    Take 1 tablet (20 mg) by mouth 4 times daily as needed    Other irritable bowel syndrome       diphenhydrAMINE 25 MG capsule    BENADRYL     TK 1 TO 2 CS PO QHS        EPIPEN 2-RIKY 0.3 MG/0.3ML injection 2-pack   Generic drug:  EPINEPHrine      INJECT 0.3 MG INTO THE MUSCLE PRF ANAPHYALAXIS        ferrous gluconate 324 (38 FE) MG tablet    FERGON    180 tablet    Take 1 tablet (324 mg) by mouth 2 times daily    AILEEN (iron deficiency anemia)       fexofenadine 180 MG tablet    ALLEGRA    90 tablet    Take 1 tablet by mouth daily as needed.    Chronic rhinitis       fluocinolone 0.01 % solution    SYNALAR     Apply topically daily as needed        fluocinonide 0.05 % solution    LIDEX    60 mL    Apply topically 2 times  daily To areas of itch on the scalp as needed.    Telogen effluvium       folic acid 1 MG tablet    FOLVITE     TK 1 T PO D        hydroxychloroquine 200 MG tablet    PLAQUENIL    180 tablet    Take 2 tablets (400 mg) by mouth daily    Inflammatory arthritis       insulin pen needle 32G X 4 MM    BD MARCK U/F    300 each    USE DAILY OR AS DIRECTED    Type 2 diabetes, HbA1c goal < 7% (H)       * ketoconazole 2 % shampoo    NIZORAL     Apply topically daily as needed        * ketoconazole 2 % cream    NIZORAL          lidocaine 5 % ointment    XYLOCAINE    50 g    Apply 1 g topically 2 times daily as needed for moderate pain    Fibromyalgia, Rheumatoid arthritis involving both wrists with positive rheumatoid factor (H)       lisinopril-hydrochlorothiazide 20-25 MG per tablet    PRINZIDE/ZESTORETIC    30 tablet    Take 1 tablet by mouth daily    Hypertension, unspecified type       Magnesium Oxide -Mg Supplement 250 MG Tabs      TK 1 T PO BID. REDUCE IF STOOLS LOOSEN        metFORMIN 500 MG 24 hr tablet    GLUCOPHAGE-XR    60 tablet    Take 2 tablets (1,000 mg) by mouth daily (with dinner)    Type 2 diabetes mellitus not at goal (H)       * metroNIDAZOLE 1 % cream    NORITATE     Apply topically daily        * metroNIDAZOLE 0.75 % cream    METROCREAM     CECI EXT TO FACE BID        mometasone 50 MCG/ACT spray    NASONEX     Spray 2 sprays into both nostrils daily        montelukast 10 MG tablet    SINGULAIR    30 tablet    Take 1 tablet (10 mg) by mouth At Bedtime    Uncomplicated asthma, unspecified asthma severity       nitroGLYcerin 0.4 MG sublingual tablet    NITROSTAT    25 tablet    Place 1 tablet (0.4 mg) under the tongue every 5 minutes as needed for chest pain    NSTEMI (non-ST elevated myocardial infarction) (H)       nystatin 644475 UNITS Tabs tablet    MYCOSTATIN     TK 2 TS PO BID        olopatadine 0.1 % ophthalmic solution    PATANOL    5 mL    Place 1 drop into both eyes 2 times daily as needed for  allergies.    Chronic rhinitis       pravastatin 40 MG tablet    PRAVACHOL    30 tablet    Take 1 tablet (40 mg) by mouth daily    Coronary artery disease involving native heart without angina pectoris, unspecified vessel or lesion type       ranitidine 150 MG tablet    ZANTAC    60 tablet    Take 1 tablet (150 mg) by mouth 2 times daily    Gastritis       spironolactone 50 MG tablet    ALDACTONE    45 tablet    Take 1 tablet (50 mg) by mouth daily . Take additional 0.5 tablets by mouth once daily as needed for weight gain.    Hypertension goal BP (blood pressure) < 140/90       sucralfate 1 GM tablet    CARAFATE    120 tablet    Take 1 tablet (1 g) by mouth 4 times daily as needed    Abdominal pain, epigastric       SYMBICORT 80-4.5 MCG/ACT Inhaler   Generic drug:  budesonide-formoterol      INHALE 2 PUFFS PO BID RINSE AND EXPECTORATE AFTER        traMADol 50 MG tablet    ULTRAM    60 tablet    Take 1 tablet (50 mg) by mouth every 8 hours as needed for moderate pain    Undifferentiated connective tissue disease (H)       TUMS 500 MG chewable tablet   Generic drug:  calcium carbonate      Take 3-4 chew tab by mouth daily as needed.        vitamin D 81095 UNIT capsule    ERGOCALCIFEROL    8 capsule    Take 1 capsule (50,000 Units) by mouth every 7 days Need a Vitamin D level in 2 months.    Vitamin D deficiency       ZOFRAN PO           * Notice:  This list has 11 medication(s) that are the same as other medications prescribed for you. Read the directions carefully, and ask your doctor or other care provider to review them with you.

## 2017-12-26 DIAGNOSIS — M35.9 UNDIFFERENTIATED CONNECTIVE TISSUE DISEASE (H): ICD-10-CM

## 2017-12-27 RX ORDER — TRAMADOL HYDROCHLORIDE 50 MG/1
50 TABLET ORAL EVERY 8 HOURS PRN
Qty: 60 TABLET | Refills: 3 | Status: SHIPPED | OUTPATIENT
Start: 2017-12-27 | End: 2018-10-26

## 2017-12-27 NOTE — TELEPHONE ENCOUNTER
tramadol   Last Written Prescription Date:  3/31/17  Last Fill Quantity: 60,   # refills: 3  Last Office Visit : 6/15/17  Future Office visit:  2/14/18    Routing refill request to provider for review/approval because:  Drug not on the [facility], [facility] or [facility] refill protocol or controlled substance

## 2017-12-29 NOTE — TELEPHONE ENCOUNTER
Patient would like prescription faxed to [facility].    Rx has been faxed to pharmacy.    Fay Ford  December 29, 2017

## 2018-02-16 ENCOUNTER — OFFICE VISIT (OUTPATIENT)
Dept: RHEUMATOLOGY | Facility: CLINIC | Age: 53
End: 2018-02-16
Attending: INTERNAL MEDICINE
Payer: COMMERCIAL

## 2018-02-16 VITALS
DIASTOLIC BLOOD PRESSURE: 75 MMHG | BODY MASS INDEX: 29.06 KG/M2 | SYSTOLIC BLOOD PRESSURE: 118 MMHG | HEIGHT: 63 IN | WEIGHT: 164 LBS | HEART RATE: 91 BPM | TEMPERATURE: 98.7 F

## 2018-02-16 DIAGNOSIS — I77.82 ANCA-ASSOCIATED VASCULITIS (H): ICD-10-CM

## 2018-02-16 DIAGNOSIS — I77.82 ANCA-ASSOCIATED VASCULITIS (H): Primary | ICD-10-CM

## 2018-02-16 LAB
ALBUMIN SERPL-MCNC: 4 G/DL (ref 3.4–5)
ALBUMIN UR-MCNC: NEGATIVE MG/DL
ALT SERPL W P-5'-P-CCNC: 21 U/L (ref 0–50)
APPEARANCE UR: ABNORMAL
AST SERPL W P-5'-P-CCNC: 15 U/L (ref 0–45)
BACTERIA #/AREA URNS HPF: ABNORMAL /HPF
BASOPHILS # BLD AUTO: 0.1 10E9/L (ref 0–0.2)
BASOPHILS NFR BLD AUTO: 0.5 %
BILIRUB UR QL STRIP: NEGATIVE
COLOR UR AUTO: YELLOW
CREAT SERPL-MCNC: 1.06 MG/DL (ref 0.52–1.04)
CRP SERPL-MCNC: 5.2 MG/L (ref 0–8)
DIFFERENTIAL METHOD BLD: ABNORMAL
EOSINOPHIL # BLD AUTO: 0.4 10E9/L (ref 0–0.7)
EOSINOPHIL NFR BLD AUTO: 3.5 %
ERYTHROCYTE [DISTWIDTH] IN BLOOD BY AUTOMATED COUNT: 12.8 % (ref 10–15)
ERYTHROCYTE [SEDIMENTATION RATE] IN BLOOD BY WESTERGREN METHOD: 17 MM/H (ref 0–30)
GFR SERPL CREATININE-BSD FRML MDRD: 55 ML/MIN/1.7M2
GLUCOSE UR STRIP-MCNC: 50 MG/DL
HCT VFR BLD AUTO: 38.3 % (ref 35–47)
HGB BLD-MCNC: 12.7 G/DL (ref 11.7–15.7)
HGB UR QL STRIP: NEGATIVE
IMM GRANULOCYTES # BLD: 0 10E9/L (ref 0–0.4)
IMM GRANULOCYTES NFR BLD: 0.2 %
KETONES UR STRIP-MCNC: 5 MG/DL
LEUKOCYTE ESTERASE UR QL STRIP: NEGATIVE
LYMPHOCYTES # BLD AUTO: 2.7 10E9/L (ref 0.8–5.3)
LYMPHOCYTES NFR BLD AUTO: 26 %
MCH RBC QN AUTO: 26 PG (ref 26.5–33)
MCHC RBC AUTO-ENTMCNC: 33.2 G/DL (ref 31.5–36.5)
MCV RBC AUTO: 79 FL (ref 78–100)
MONOCYTES # BLD AUTO: 0.6 10E9/L (ref 0–1.3)
MONOCYTES NFR BLD AUTO: 5.9 %
MUCOUS THREADS #/AREA URNS LPF: PRESENT /LPF
NEUTROPHILS # BLD AUTO: 6.6 10E9/L (ref 1.6–8.3)
NEUTROPHILS NFR BLD AUTO: 63.9 %
NITRATE UR QL: NEGATIVE
NRBC # BLD AUTO: 0 10*3/UL
NRBC BLD AUTO-RTO: 0 /100
PH UR STRIP: 6 PH (ref 5–7)
PLATELET # BLD AUTO: 340 10E9/L (ref 150–450)
RBC # BLD AUTO: 4.88 10E12/L (ref 3.8–5.2)
RBC #/AREA URNS AUTO: 3 /HPF (ref 0–2)
SOURCE: ABNORMAL
SP GR UR STRIP: 1.02 (ref 1–1.03)
SQUAMOUS #/AREA URNS AUTO: 4 /HPF (ref 0–1)
UROBILINOGEN UR STRIP-MCNC: 0 MG/DL (ref 0–2)
WBC # BLD AUTO: 10.3 10E9/L (ref 4–11)
WBC #/AREA URNS AUTO: 1 /HPF (ref 0–2)

## 2018-02-16 PROCEDURE — 86803 HEPATITIS C AB TEST: CPT | Performed by: INTERNAL MEDICINE

## 2018-02-16 PROCEDURE — 87340 HEPATITIS B SURFACE AG IA: CPT | Performed by: INTERNAL MEDICINE

## 2018-02-16 PROCEDURE — 86704 HEP B CORE ANTIBODY TOTAL: CPT | Performed by: INTERNAL MEDICINE

## 2018-02-16 PROCEDURE — 82565 ASSAY OF CREATININE: CPT | Performed by: INTERNAL MEDICINE

## 2018-02-16 PROCEDURE — G0463 HOSPITAL OUTPT CLINIC VISIT: HCPCS | Mod: ZF

## 2018-02-16 PROCEDURE — 36415 COLL VENOUS BLD VENIPUNCTURE: CPT | Performed by: INTERNAL MEDICINE

## 2018-02-16 PROCEDURE — 83516 IMMUNOASSAY NONANTIBODY: CPT | Performed by: INTERNAL MEDICINE

## 2018-02-16 PROCEDURE — 83876 ASSAY MYELOPEROXIDASE: CPT | Performed by: INTERNAL MEDICINE

## 2018-02-16 PROCEDURE — 82306 VITAMIN D 25 HYDROXY: CPT | Performed by: INTERNAL MEDICINE

## 2018-02-16 PROCEDURE — 86235 NUCLEAR ANTIGEN ANTIBODY: CPT | Performed by: INTERNAL MEDICINE

## 2018-02-16 PROCEDURE — 86140 C-REACTIVE PROTEIN: CPT | Performed by: INTERNAL MEDICINE

## 2018-02-16 PROCEDURE — 85025 COMPLETE CBC W/AUTO DIFF WBC: CPT | Performed by: INTERNAL MEDICINE

## 2018-02-16 PROCEDURE — 81001 URINALYSIS AUTO W/SCOPE: CPT | Performed by: INTERNAL MEDICINE

## 2018-02-16 PROCEDURE — 84460 ALANINE AMINO (ALT) (SGPT): CPT | Performed by: INTERNAL MEDICINE

## 2018-02-16 PROCEDURE — 85652 RBC SED RATE AUTOMATED: CPT | Performed by: INTERNAL MEDICINE

## 2018-02-16 PROCEDURE — 82040 ASSAY OF SERUM ALBUMIN: CPT | Performed by: INTERNAL MEDICINE

## 2018-02-16 PROCEDURE — 86480 TB TEST CELL IMMUN MEASURE: CPT | Performed by: INTERNAL MEDICINE

## 2018-02-16 PROCEDURE — 86255 FLUORESCENT ANTIBODY SCREEN: CPT | Performed by: INTERNAL MEDICINE

## 2018-02-16 PROCEDURE — 84450 TRANSFERASE (AST) (SGOT): CPT | Performed by: INTERNAL MEDICINE

## 2018-02-16 ASSESSMENT — PAIN SCALES - GENERAL: PAINLEVEL: MODERATE PAIN (5)

## 2018-02-16 NOTE — MR AVS SNAPSHOT
After Visit Summary   2/16/2018    Janine Cornell    MRN: [id number]           Patient Information     Date Of Birth          1965        Visit Information        Provider Department      2/16/2018 4:00 PM Majo Mckinnon MD [facility]        Today's Diagnoses     ANCA-associated vasculitis (H)    -  1      Care Instructions    Labs today    Recommend rituximab    Keep May appointment          Follow-ups after your visit        Your next 10 appointments already scheduled     Feb 16, 2018  5:15 PM CST   Lab with  LAB   [facility] ([facility])    [address] [place] [id number] [phone]            May 04, 2018  1:00 PM CDT   (Arrive by 12:45 PM)   Return Visit with Majo Mckinnon MD   [facility] ([facility])    [address] [place] [id number] [phone]            Jul 17, 2018 12:00 PM CDT   LAB with [facility] ([facility])    [address] [place] [id number] [phone]           Please do not eat 10-12 hours before your appointment if you are coming in fasting for labs on lipids, cholesterol, or glucose (sugar). This does not apply to pregnant women. Water, hot tea and black coffee (with nothing added) are okay. Do not drink other fluids, diet soda or chew gum.            Jul 18, 2018  2:30 PM CDT   Ech Complete with [id number]   [facility] ([facility])    [address] [place] [address]  3rd [facility] [id number] [phone]           1. Please bring or wear a comfortable two-piece outfit. 2. You may eat, drink and take your normal medicines. 3. For any questions that cannot be answered, please contact the ordering physician            Jul 18, 2018  3:30 PM CDT   (Arrive by 3:15 PM)   Return Visit with Milan Peace MD   [facility] ([facility])    [address] [facility]  [address] [place] [id number] [phone]              Future tests that were ordered for you today     Open Standing Orders        Priority Remaining Interval Expires Ordered    Vasculitis panel Routine 1/1 AM DRAW  2/16/2018          Open Future Orders        Priority Expected Expires Ordered    Hepatitis B surface antigen Routine  2/16/2019 2/16/2018    Hepatitis B core antibody Routine  2/16/2019 2/16/2018    Hepatitis C antibody Routine  2/16/2019 2/16/2018    M Tuberculosis by Quantiferon Gold Routine  2/16/2019 2/16/2018    Antineutrophil cytoplasmic Marline IgG Routine  2/16/2019 2/16/2018    ALT Routine  2/16/2019 2/16/2018    Albumin level Routine  2/16/2019 2/16/2018    CBC with platelets differential Routine  2/16/2019 2/16/2018    Creatinine Routine  2/16/2019 2/16/2018    CRP inflammation Routine  2/16/2019 2/16/2018    Erythrocyte sedimentation rate auto Routine  2/16/2019 2/16/2018    Routine UA with micro reflex to culture Routine  2/16/2019 2/16/2018    Vitamin D Deficiency Routine  2/16/2019 2/16/2018    SSA Ro SUNNY Antibody IgG Routine  2/16/2019 2/16/2018    SSB La SUNNY Antibody IgG Routine  2/16/2019 2/16/2018    AST Routine  2/16/2019 2/16/2018            Who to contact     If you have questions or need follow up information about today's clinic visit or your schedule please contact [facility] directly at [phone].  Normal or non-critical lab and imaging results will be communicated to you by MyChart, letter or phone within 4 business days after the clinic has received the results. If you do not hear from us within 7 days, please contact the clinic through MyChart or phone. If you have a critical or abnormal lab result, we will notify you by phone as soon as possible.  Submit refill requests through [facility] or call your pharmacy and they will forward the refill request to us. Please allow 3 business days for your refill to be  "completed.          Additional Information About Your Visit        [employer] Information     [employer] gives you secure access to your electronic health record. If you see a primary care provider, you can also send messages to your care team and make appointments. If you have questions, please call your primary care clinic.  If you do not have a primary care provider, please call [phone] and they will assist you.        Care EveryWhere ID     This is your Care EveryWhere ID. This could be used by other organizations to access your [place] medical records  [id number]        Your Vitals Were     Pulse Temperature Height Last Period BMI (Body Mass Index)       91 98.7  F (37.1  C) (Oral) 1.6 m (5' 3\") (LMP Unknown) 29.05 kg/m2        Blood Pressure from Last 3 Encounters:   02/16/18 118/75   12/20/17 126/85   11/22/17 118/79    Weight from Last 3 Encounters:   02/16/18 74.4 kg (164 lb)   12/20/17 78.2 kg (172 lb 8 oz)   11/22/17 73.9 kg (163 lb)                 Today's Medication Changes          These changes are accurate as of 2/16/18  5:10 PM.  If you have any questions, ask your nurse or doctor.               These medicines have changed or have updated prescriptions.        Dose/Directions    ferrous gluconate 324 (38 FE) MG tablet   Commonly known as:  FERGON   This may have changed:  when to take this   Used for:  AILEEN (iron deficiency anemia)        Dose:  1 tablet   Take 1 tablet (324 mg) by mouth 2 times daily   Quantity:  180 tablet   Refills:  1       vitamin D 82975 UNIT capsule   Commonly known as:  ERGOCALCIFEROL   This may have changed:  additional instructions   Used for:  Vitamin D deficiency        Dose:  52599 Units   Take 1 capsule (50,000 Units) by mouth every 7 days Need a Vitamin D level in 2 months.   Quantity:  8 capsule   Refills:  0                Primary Care Provider    Lauren Francis RN       No address on file        Equal Access to Services     MANDA QUINN AH: Hadii frederick wallace " ghazala June, wasolda lufroilanadaha, qacodyta kasabina johnson, gagandeep pabloliat carlton. So [facility] [phone].    ATENCIÓN: Si soniyala kerrie, tiene a burch disposición servicios gratuitos de asistencia lingüística. Melina al [phone].    We comply with applicable federal civil rights laws and [place] laws. We do not discriminate on the basis of race, color, national origin, age, disability, sex, sexual orientation, or gender identity.            Thank you!     Thank you for choosing [facility]  for your care. Our goal is always to provide you with excellent care. Hearing back from our patients is one way we can continue to improve our services. Please take a few minutes to complete the written survey that you may receive in the mail after your visit with us. Thank you!             Your Updated Medication List - Protect others around you: Learn how to safely use, store and throw away your medicines at www.disposemymeds.org.          This list is accurate as of 2/16/18  5:10 PM.  Always use your most recent med list.                   Brand Name Dispense Instructions for use Diagnosis    acetaminophen 325 MG tablet    TYLENOL    250 tablet    Take 1-2 tablets (325-650 mg) by mouth every 6 hours as needed for pain (headache)    Other chronic pain, Headache(784.0)       AEROSPAN 80 MCG/ACT Aers oral inhaler   Generic drug:  flunisolide HFA      INHALE 2 PUFFS PO BID.  RINSE MOUTH AFTERWARDS        * albuterol 108 (90 BASE) MCG/ACT Inhaler    PROAIR HFA/PROVENTIL HFA/VENTOLIN HFA    3 Inhaler    Inhale 2 puffs into the lungs every 6 hours as needed for shortness of breath / dyspnea or wheezing        * albuterol (2.5 MG/3ML) 0.083% neb solution      INL 1 VIAL VIA NEBULIZATION Q 4 TO 6 HOURS PRN        azelastine 0.1 % spray    ASTELIN     U 2 SPRAYS IEN BID        bacitracin ophthalmic ointment     3.5 g    Apply to incision line three times daily.    Postoperative eye state       * insulin  glargine 100 UNIT/ML injection    LANTUS     Inject 40 Units Subcutaneous daily (with breakfast)        * BASAGLAR 100 UNIT/ML injection     18 mL    Inject 40 Units Subcutaneous daily    Type 2 diabetes mellitus without complication (H)       * BASAGLAR 100 UNIT/ML injection     12 mL    Inject 40 Units Subcutaneous daily    Type 2 diabetes mellitus without complication (H)       blood glucose monitoring lancets     4 Box    Use to test blood sugars 4 times daily or as directed.    Type 2 diabetes, HbA1c goal < 7% (H)       blood glucose monitoring meter device kit    no brand specified    1 kit    Use to test blood sugar 4 X times daily or as directed.    Type 2 diabetes, HbA1c goal < 7% (H)       blood glucose monitoring test strip    no brand specified    360 each    1 strip by In Vitro route 4 times daily Test as directed. Please dispense three months and three months of refills. Thank you.    Type 2 diabetes, HbA1c goal < 7% (H)       clopidogrel 75 MG tablet    PLAVIX    30 tablet    Take 1 tablet (75 mg) by mouth daily        * COMPRESSION STOCKINGS     4 each    2 each daily    Bilateral lower extremity edema, Venous incompetence       * COMPRESSION STOCKINGS     4 each    2 each daily Apply one 10-15 mmHg compression stocking to each lower extgmierty during the day and remove before bedtime.    Venous incompetence, Bilateral lower extremity edema       cyclobenzaprine 10 MG tablet    FLEXERIL    180 tablet    Take 1 tablet (10 mg) by mouth 2 times daily as needed for muscle spasms    Fibromyalgia       desonide 0.05 % cream    DESOWEN     Apply topically 2 times daily        dicyclomine 20 MG tablet    BENTYL    60 tablet    Take 1 tablet (20 mg) by mouth 4 times daily as needed    Other irritable bowel syndrome       diphenhydrAMINE 25 MG capsule    BENADRYL     TK 1 TO 2 CS PO QHS        EPIPEN 2-RIKY 0.3 MG/0.3ML injection 2-pack   Generic drug:  EPINEPHrine      INJECT 0.3 MG INTO THE MUSCLE PRF  ANAPHYALAXIS        ferrous gluconate 324 (38 FE) MG tablet    FERGON    180 tablet    Take 1 tablet (324 mg) by mouth 2 times daily    AILEEN (iron deficiency anemia)       fexofenadine 180 MG tablet    ALLEGRA    90 tablet    Take 1 tablet by mouth daily as needed.    Chronic rhinitis       fluocinolone 0.01 % solution    SYNALAR     Apply topically daily as needed        fluocinonide 0.05 % solution    LIDEX    60 mL    Apply topically 2 times daily To areas of itch on the scalp as needed.    Telogen effluvium       folic acid 1 MG tablet    FOLVITE     TK 1 T PO D        hydroxychloroquine 200 MG tablet    PLAQUENIL    180 tablet    Take 2 tablets (400 mg) by mouth daily    Inflammatory arthritis       insulin pen needle 32G X 4 MM    BD MARCK U/F    300 each    USE DAILY OR AS DIRECTED    Type 2 diabetes, HbA1c goal < 7% (H)       * ketoconazole 2 % shampoo    NIZORAL     Apply topically daily as needed        * ketoconazole 2 % cream    NIZORAL          lidocaine 5 % ointment    XYLOCAINE    50 g    Apply 1 g topically 2 times daily as needed for moderate pain    Fibromyalgia, Rheumatoid arthritis involving both wrists with positive rheumatoid factor (H)       lisinopril-hydrochlorothiazide 20-25 MG per tablet    PRINZIDE/ZESTORETIC    30 tablet    Take 1 tablet by mouth daily    Hypertension, unspecified type       Magnesium Oxide -Mg Supplement 250 MG Tabs      TK 1 T PO BID. REDUCE IF STOOLS LOOSEN        metFORMIN 500 MG 24 hr tablet    GLUCOPHAGE-XR    60 tablet    Take 2 tablets (1,000 mg) by mouth daily (with dinner)    Type 2 diabetes mellitus not at goal (H)       * metroNIDAZOLE 1 % cream    NORITATE     Apply topically daily        * metroNIDAZOLE 0.75 % cream    METROCREAM     CECI EXT TO FACE BID        mometasone 50 MCG/ACT spray    NASONEX     Spray 2 sprays into both nostrils daily        montelukast 10 MG tablet    SINGULAIR    30 tablet    Take 1 tablet (10 mg) by mouth At Bedtime    Uncomplicated  asthma, unspecified asthma severity       nitroGLYcerin 0.4 MG sublingual tablet    NITROSTAT    25 tablet    Place 1 tablet (0.4 mg) under the tongue every 5 minutes as needed for chest pain    NSTEMI (non-ST elevated myocardial infarction) (H)       nystatin 623438 UNITS Tabs tablet    MYCOSTATIN     TK 2 TS PO BID        olopatadine 0.1 % ophthalmic solution    PATANOL    5 mL    Place 1 drop into both eyes 2 times daily as needed for allergies.    Chronic rhinitis       pravastatin 40 MG tablet    PRAVACHOL    30 tablet    Take 1 tablet (40 mg) by mouth daily    Coronary artery disease involving native heart without angina pectoris, unspecified vessel or lesion type       ranitidine 150 MG tablet    ZANTAC    60 tablet    Take 1 tablet (150 mg) by mouth 2 times daily    Gastritis       spironolactone 50 MG tablet    ALDACTONE    45 tablet    Take 1 tablet (50 mg) by mouth daily . Take additional 0.5 tablets by mouth once daily as needed for weight gain.    Hypertension goal BP (blood pressure) < 140/90       sucralfate 1 GM tablet    CARAFATE    120 tablet    Take 1 tablet (1 g) by mouth 4 times daily as needed    Abdominal pain, epigastric       SYMBICORT 80-4.5 MCG/ACT Inhaler   Generic drug:  budesonide-formoterol      INHALE 2 PUFFS PO BID RINSE AND EXPECTORATE AFTER        traMADol 50 MG tablet    ULTRAM    60 tablet    Take 1 tablet (50 mg) by mouth every 8 hours as needed for moderate pain    Undifferentiated connective tissue disease (H)       TUMS 500 MG chewable tablet   Generic drug:  calcium carbonate      Take 3-4 chew tab by mouth daily as needed.        vitamin D 12872 UNIT capsule    ERGOCALCIFEROL    8 capsule    Take 1 capsule (50,000 Units) by mouth every 7 days Need a Vitamin D level in 2 months.    Vitamin D deficiency       ROHANFRAN PO           * Notice:  This list has 11 medication(s) that are the same as other medications prescribed for you. Read the directions carefully, and ask your  doctor or other care provider to review them with you.

## 2018-02-16 NOTE — LETTER
2/16/2018      RE: Janine Cornell  [id number] [place] [id number]       Rheumatology F/U Note    Last visit note: 6/15/2017    Today's visit date: 2/16/2018    Reason for visit: RA, Fibromyalgia     HPI from last visit    Ms. Cornell is a 51 yo AAF who was referred to our clinic for evaluation and management of her joint pain in setting of positive RF 26.    Her joint symptoms first started more than a year ago, but over last 6-8 months fluctuating some good and bad days . All her joints are involved. Over last 5 months, hands became swollen, warm and painful. Tylenol does not help. Ketoprofen did not help. Was told to avoid NSAIDs given ACS. Reports AM/PM stiffness x 2-3 hr, can't make full fist when wakes up in AM.    She feels tired all the time. Reports worsening hair loss and unchanged  facial rash. Gets red sore flaky rash over cheeks across her face which is intermittent diagnosed Rosacea and is prescribed metronidazole gel which she has not started using. Reports occasional oral ulcers. Hands feel cold with red discoloration last winter. Has occasional dysphagia to both solids and liquid. Has dry eyes, is using OTC allergy eyedrops. Has chronic abdominal pain. Has h/o pancreatitis. Sometime has anxiety. Occasionally has numbness, tingling in fingers/toes. Has back and spine issues, her whole back and neck hurts, different areas at different time. She is wondering if she has FMS. Has hard time sleeping, has never been diagnosed with BRENNA. She does not know if he snores. She was found to have slightly positive RF in 2/2013. Has microcytic anemia.     Her arthralgia gets worse with drop in temperature. Reports body ache. Activity makes her pain worse. Her joint swelling isintermittent. Her body pain and joint pain is the same. Reports AM stiffness x 3 hr.    She has been doing acupuncture x few months and it is helping with her pain. She thinks that the combination of plaquenil and acupuncture is  helpful but overall she notice 30% in her symptoms relief.     Takes tylenol and tramadol on occasion for pain.    She decided to use different formulation of metformin which helped with her abdominal pain. I referred her to GI for evaluation of elevated lipase and abdominal pain. She decided to see GI in the future.       She is on  mg qd since 5/2014, tolerates it well and it helps her some. Denies taking any gabapentin due to chest pain and headches. She has had referral her for water aerobics, but she can't begin until she's healed from recent surgery in 10/2014. She thinks HCQ is helping but not enough to control all her pain, reports 2-3 hr of AM stiffness with ongoing diffuse arthralgia/myalgia along with intermittent swelling of hands. She does see her acupuncture person and it helps with her pain, has not started water aerobics yet. Has got her flu shot.       10/2015: Reports having pleuritic CP in 3/2015, was prescribed Lidoderm patches first. It did not help. Took prednisone (?dose) by her own, pain got better but it recurred off prednisone and gradually got worse to the point that she could not stand the pain anymore, was seen in ER in 5/2015, was treated with medrol dose pack which helped. Reports pain over hips, knees, ankles and fingers. Pain gets worse with walking. Reports myalgia, swelling of the arms. Pain over neck, shoulders. Has AM stiffness x 3-4 hr. Fingers swell up and become painful. Can't remember if steroid helped with joint pain. She had headaches, severe CP when she took tramadol and gabapentin and stopped taking them, sx resolved but she can't tell which one caused SEs but thinks that probably it was gabapentin. She has good days and tries to be more active but activity aggravates the pain. Headaches are intermittent. HCQ helps some not enough to control all the pain. No HCQ SEs. Had eye exam around July 2015. Flexeril helps but PCP wants to change flexeril to zanaflex,  reporting that she is NOT comfortable with the change. Acupuncture still helps an she continued to  do that. Concerned about fat pads she has in supraclavicular area, has h/o thyroid nodules. Continues having hair loss, takes iron for iron deficiency.     2/2016: She has 2 major complaints today, increased joint pain/swelling in hands/feet and recent Dx of optic neuritis in R eye, reports having similar problem about 20 yr ago, now recurred. Has a spot in R eye vision x long term, was seen by ophthalmology few days ago and was told to have optic neuritis. Gets joint pain, muscle pain. Can't  objects, hands are swollen. Knees, hips and ankles are painful. Reports more arthralgia. AM stiffness/ pain is 3-4 hr. She wants me to talk to her ophthalmologist directly.      She had botox inj for migraines which did not help her. She is going to have angiogram next wk, had to take more nitro for CP recently.       4/29/2016: She reports being on steroid taper x 3 since last visit. Reportedly, had orbit MRI, it showed swelling/inflamamtion of R lacrimal glands. She had painful swelling of her R eye, cause her headaches. Botox inj made her headaches worse. She is being dealing with this since 1/2016, with severe headaches and pain/swelling around R eye. Had biopsy in 3/2016. She is frustrated as prednisone causes her weight gain and her BG is difficult to control on it.    5/2016: At last visit, was prescribed MTX 10 mg po qwk to take along with FA 1 mg qd. She decided not to take MTX, is afraid of side effects, believes all these medications would harm her. She also believes that botox caused her inflammation around R eye. No major flare up of per-orbital inflamamtion since last visit but continues to have swelling around her R eye.      11/2016: Reports diffuse body ache, arthralgia, myalgia especially with weather change. No major flare up of campos-orbital inflammation but her face/around R eye swells up from time to  time. Reports 2 episodes of CP over past 2 mo, nitro sometimes helps and sometimes does not help. She has asthma and costochondritis and she has hard time to distinguish the origin of pain. Sometimes knees and fingers swell up. Her stiffness is sometimes all day.    4/2017:  Reports having sinusitis since last visit. Her R eye is dry and is using eyedrops to keep it moist. Reports having pain in ears. Was treated with antibiotics by PCP, it got better then it got worse. Reports catching infections easily. Then started having pressure over eyes with pain/headaches (exact start date is unknown). Pain gradually got worse and became persistent. Had sinus CT.     4/7/2017: Having a flare up of swelling, pain around her R orbit. Has not tried MTX yet.      5/2017: On MTX 10 mg po qwk since 4/7/2017, reports increased stomach pain and nausea and new headaches since start of MTX and it's not helping. Pain/swelling around R orbit is worse.    6/2017: She finished prednisone taper prescribed at last visit, R campos-orbital swelling significantly improved. 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    Today: R campos-orbital swelling has improved. Repeat R lacrimal gland biopsy in 8/2017 showed non specific inflammation with no lymphoma. She is off steroid. Did not tolerate AZA due to N/V and abdominal pain.        Her records were reviewed.        Component      Latest Ref Rng 2/28/2013 2/28/2013          12:14 PM 12:14 PM   WBC      4.0 - 11.0 10e9/L     RBC Count      3.8 - 5.2 10e12/L     Hemoglobin      11.7 - 15.7 g/dL     Hematocrit      35.0 - 47.0 %     MCV      78 - 100 fl     MCH      26.5 - 33.0 pg     MCHC      31.5 - 36.5 g/dL     RDW      10.0 - 15.0 %     Platelet Count      150 - 450 10e9/L     Specimen Description           Lyme Screen IgG and IgM           Vitamin D  Deficiency screening      30 - 75 ug/L     Ferritin      10 - 300 ng/mL     Sed Rate      0 - 20 mm/h     ALLI Screen by EIA      <1.0     Rheumatoid Factor      0 - 14 IU/mL     CK Total      30 - 225 U/L  78   Uric Acid      2.5 - 7.5 mg/dL 6.7      Component      Latest Ref Rng 2/28/2013          12:14 PM   WBC      4.0 - 11.0 10e9/L    RBC Count      3.8 - 5.2 10e12/L    Hemoglobin      11.7 - 15.7 g/dL    Hematocrit      35.0 - 47.0 %    MCV      78 - 100 fl    MCH      26.5 - 33.0 pg    MCHC      31.5 - 36.5 g/dL    RDW      10.0 - 15.0 %    Platelet Count      150 - 450 10e9/L    Specimen Description       Serum   Lyme Screen IgG and IgM       Test value: <0.75....Interpretation: Negative....If you highly suspect Lyme . . .   Vitamin D Deficiency screening      30 - 75 ug/L    Ferritin      10 - 300 ng/mL    Sed Rate      0 - 20 mm/h    ALLI Screen by EIA      <1.0    Rheumatoid Factor      0 - 14 IU/mL    CK Total      30 - 225 U/L    Uric Acid      2.5 - 7.5 mg/dL      Component      Latest Ref Rng 2/28/2013 2/28/2013 2/28/2013 2/28/2013          12:14 PM 12:14 PM 12:14 PM 12:14 PM   WBC      4.0 - 11.0 10e9/L       RBC Count      3.8 - 5.2 10e12/L       Hemoglobin      11.7 - 15.7 g/dL       Hematocrit      35.0 - 47.0 %       MCV      78 - 100 fl       MCH      26.5 - 33.0 pg       MCHC      31.5 - 36.5 g/dL       RDW      10.0 - 15.0 %       Platelet Count      150 - 450 10e9/L       Specimen Description             Lyme Screen IgG and IgM             Vitamin D Deficiency screening      30 - 75 ug/L       Ferritin      10 - 300 ng/mL    10   Sed Rate      0 - 20 mm/h   23 (H)    ALLI Screen by EIA      <1.0  <1.0 . . .     Rheumatoid Factor      0 - 14 IU/mL 26 (H)      CK Total      30 - 225 U/L       Uric Acid      2.5 - 7.5 mg/dL         Component      Latest Ref Rng 2/28/2013 2/28/2013          12:14 PM 12:14 PM   WBC      4.0 - 11.0 10e9/L 14.1 (H)    RBC Count      3.8 - 5.2 10e12/L 4.55     Hemoglobin      11.7 - 15.7 g/dL 10.7 (L)    Hematocrit      35.0 - 47.0 % 33.3 (L)    MCV      78 - 100 fl 73 (L)    MCH      26.5 - 33.0 pg 23.5 (L)    MCHC      31.5 - 36.5 g/dL 32.1    RDW      10.0 - 15.0 % 18.1 (H)    Platelet Count      150 - 450 10e9/L 407    Specimen Description           Lyme Screen IgG and IgM           Vitamin D Deficiency screening      30 - 75 ug/L  32   Ferritin      10 - 300 ng/mL     Sed Rate      0 - 20 mm/h     ALLI Screen by EIA      <1.0     Rheumatoid Factor      0 - 14 IU/mL     CK Total      30 - 225 U/L     Uric Acid      2.5 - 7.5 mg/dL          MRI CERVICAL SPINE WITHOUT CONTRAST 4/3/2013 12:47 PM    HISTORY: Cervicalgia. Degeneration of cervical intervertebral disc.  MRI cervical spine. Evaluate bilateral supraclavicular lymph nodes.  Clinical enlargement.    TECHNIQUE: Multiplanar multisequence MRI of the cervical spine  without gadolinium contrast.    COMPARISON: None.    FINDINGS: The patient has a developmentally small central canal.  Images of the cervical cord reveals small areas of T2 hyperintensity  within the cervical cord. There is a small area of high signal  intensity at the C1 level. There is also a small area of high signal  intensity at the C6-C7 level. The area of high signal at C6-C7 is  located at an area of central spinal stenosis. This may be due to  myelomalacia. The area of high signal at C1-C2 is indeterminate.    C2-C3: Normal disc, facet joints, spinal canal and neural foramina.    C3-C4: Normal disc, facet joints, spinal canal and neural foramina.    C4-C5: Normal disc, facet joints, spinal canal and neural foramina.    C5-C6: There is degeneration of the disc. There is a mild annular  disc bulge. The central canal is mild-moderately narrowed. The  residual AP diameter of the central spinal canal measures  approximately 9 mm.    C6-C7: There is degeneration of the disc. There is loss of disc space  height. There is a diffuse annular disc bulge.  There are associated  posterior osteophytes. There are severe central spinal stenosis at  this level. The residual AP diameter of the central spinal canal is  only about 6 mm. There is significant flattening of the cord. There  is high signal in the cord at this level consistent with  myelomalacia. There is moderate to severe right foraminal stenosis  due to and uncinate spur.    C7-T1: Normal disc, facet joints, spinal canal and neural foramina.            Result Impression       IMPRESSION:  1. Severe central spinal stenosis at C6-C7 due to a developmentally  small canal and due to a bulging disc and associated posterior  osteophyte. There is flattening of the cord at this level and high  signal in the cord at this level consistent with myelomalacia.  2. There is a small, indeterminate 2-3 mm area of high signal  intensity in the cord at the C1 level. This could be due to gliosis  secondary to a previous infectious or inflammatory process.  Demyelinating disease could also have a similar appearance. Clinical  correlation suggested.    GAIL MORE MD     MRI LEFT UPPER EXTREMITY NON-JOINT WITHOUT CONTRAST, MRI RIGHT UPPER  EXTREMITY NON-JOINT WITHOUT CONTRAST April 3, 2013 1:32 PM    HISTORY: Cervicalgia. Degeneration of cervical intervertebral disc.    TECHNIQUE: Multiplanar, multisequence imaging of the brachial plexus  was performed without gadolinium contrast enhancement.    COMPARISON: None.    FINDINGS: No mass lesions are seen. No inflammatory process is  identified. The roots, trunks, branches, and divisions of both the  right and left brachial plexus appear normal. No adenopathy is seen.  The anterior scalene muscles and the middle scalene muscles appear  normal. Nodules are seen within the thyroid gland. The largest nodule  is seen in the left lobe of the thyroid. This measures about 1.8 cm  in diameter.            Result Impression       IMPRESSION:  1. Both the right and left brachial plexus regions  appear normal.  2. Thyroid nodules. The largest nodule is in the left lobe of the  thyroid. It measures about 1.8 cm in diameter.       XR WRIST BILATERAL G/E 3 VIEWS    Narrative:        EXAMINATION:  1. Each hand 3 views  2. Each wrist 3 views     DATE: 5/1/2013     HISTORY: Arthropathy with concern for rheumatoid.     FINDINGS: 3 views of each hand and 3 views of each wrist are obtained  without prior for comparison. Alignment is normal. There is no  fracture or acute bone abnormality. No distinct erosions are seen.  Some spurring of the distal radial ulnar joint is noted on the right.       Impression:     IMPRESSION:  1. No evidence of an inflammatory arthropathy involving either hand  or wrist.     VIELKA GUEVARA MD         XR FOOT BILATERAL G/E 3 VIEWS    Narrative:        EXAMINATION:  1. Each foot 3 views  2. Each ankle 3 views  3. Sacroiliac joint series     DATE: 5/1/2013     HISTORY: Arthropathy; concern for rheumatoid.     FINDINGS: No prior for comparison.     A frontal and bilateral oblique evaluation of the sacroiliac joints  shows no malalignment. Some patchy sclerosis is identified along the  central aspect of both sacroiliac joints, which is favored to be  degenerative. No distinct erosions are seen. The visualized portion  of the hip joint spaces appear maintained, with no erosive changes  identified. Mild endplate spurring is noted in the lower lumbar  spine.     3 views of each foot and 3 views of each ankle show no evidence of  acute fracture or dislocation. The metatarsal phalangeal,  tarsometatarsal and intertarsal joint spaces appear maintained. No  erosions are identified. There is no sign of acroosteolysis. The  ankle mortise and talar dome are intact bilaterally. Minimal spurring  is noted along the tip of the medial malleolus bilaterally.       Impression:     IMPRESSION:  1. Patchy sclerosis identified along the central aspect of both  sacroiliac joints, which is favored to be  degenerative. No distinct  erosions are seen.  2. No evidence of an inflammatory arthropathy in either foot or ankle.     VIELKA GUEVARA MD     5/2013  CBC WITH PLATELETS DIFFERENTIAL       Component Value Range    WBC 11.3 (*) 4.0 - 11.0 10e9/L    RBC Count 4.56  3.8 - 5.2 10e12/L    Hemoglobin 11.1 (*) 11.7 - 15.7 g/dL    Hematocrit 34.3 (*) 35.0 - 47.0 %    MCV 75 (*) 78 - 100 fl    MCH 24.3 (*) 26.5 - 33.0 pg    MCHC 32.4  31.5 - 36.5 g/dL    RDW 16.1 (*) 10.0 - 15.0 %    Platelet Count 315  150 - 450 10e9/L    Diff Method Automated Method      % Neutrophils 71.6  40 - 75 %    % Lymphocytes 20.9  20 - 48 %    % Monocytes 4.3  0 - 12 %    % Eosinophils 2.8  0 - 6 %    % Basophils 0.2  0 - 2 %    % Immature Granulocytes 0.2  0 - 0.4 %    Absolute Neutrophil 8.1  1.6 - 8.3 10e9/L    Absolute Lymphocytes 2.4  0.8 - 5.3 10e9/L    Absolute Monoctyes 0.5  0.0 - 1.3 10e9/L    Absolute Eosinophils 0.3  0.0 - 0.7 10e9/L    Absolute Basophils 0.0  0.0 - 0.2 10e9/L    Abs Immature Granulocytes 0.0  0 - 0.03 10e9/L   AMYLASE       Component Value Range    Amylase 103  30 - 110 U/L   COMPREHENSIVE METABOLIC PANEL       Component Value Range    Sodium 144  133 - 144 mmol/L    Potassium 3.8  3.4 - 5.3 mmol/L    Chloride 105  94 - 109 mmol/L    Carbon Dioxide 23  20 - 32 mmol/L    Anion Gap 17  6 - 17 mmol/L    Glucose 173 (*) 60 - 99 mg/dL    Urea Nitrogen 13  5 - 24 mg/dL    Creatinine 0.83  0.52 - 1.04 mg/dL    GFR Estimate 74  >60 mL/min/1.7m2    GFR Estimate If Black 90  >60 mL/min/1.7m2    Calcium 9.7  8.5 - 10.4 mg/dL    Bilirubin Total 0.4  0.2 - 1.3 mg/dL    Albumin 4.3  3.9 - 5.1 g/dL    Protein Total 7.8  6.8 - 8.8 g/dL    Alkaline Phosphatase 66  40 - 150 U/L    ALT 36  0 - 50 U/L    AST 28  0 - 45 U/L   CK TOTAL       Component Value Range    CK Total 66  30 - 225 U/L   CRP INFLAMMATION       Component Value Range    CRP Inflammation 10.4 (*) 0.0 - 8.0 mg/L   LIPASE       Component Value Range    Lipase 353 (*) 20 - 250  U/L   ERYTHROCYTE SEDIMENTATION RATE AUTO       Component Value Range    Sed Rate 26 (*) 0 - 20 mm/h   ROUTINE UA WITH MICROSCOPIC REFLEX TO CULTURE       Component Value Range    Color Urine Yellow      Appearance Urine Slightly Cloudy      Glucose Urine 30 (*) NEG mg/dL    Bilirubin Urine Negative  NEG    Ketones Urine 5 (*) NEG mg/dL    Specific Gravity Urine 1.026  1.003 - 1.035    Blood Urine Trace (*) NEG    pH Urine 5.0  5.0 - 7.0 pH    Protein Albumin Urine 10 (*) NEG mg/dL    Urobilinogen mg/dL Normal  0.0 - 2.0 mg/dL    Nitrite Urine Negative  NEG    Leukocyte Esterase Urine Negative  NEG    Source Midstream Urine      WBC Urine 1  0 - 2 /HPF    RBC Urine 4 (*) 0 - 2 /HPF    Squamous Epithelial /HPF Urine <1  0 - 1 /HPF    Mucous Urine Present (*) NEG /LPF    Hyaline Casts 3 (*) 0 - 2 /LPF    Calcium Oxalate Moderate (*) NEG /HPF   COMPLEMENT C3       Component Value Range    Complement C3 143  76 - 169 mg/dL   COMPLEMENT C4       Component Value Range    Complement C4 31  15 - 50 mg/dL   HEPATITIS C ANTIBODY       Component Value Range    Hepatitis C Antibody Negative  NEG   CARDIOLIPIN ANTIBODY IGG AND IGM       Component Value Range    Cardiolipin IgG Marline    0 - 15.0 GPL    Value: <15.0      Interpretation:  Negative    Cardiolipin IgM Marline    0 - 12.5 MPL    Value: <12.5      Interpretation:  Negative   LUPUS PANEL       Component Value Range    Lupus Result    NEG    Value: Negative      (Note)      COMMENTS:      The INR is normal.      APTT is normal.  1:2 Mix is not indicated.      DRVVT Screen is normal.      Thrombin time is normal.      NEGATIVE TEST; A LUPUS ANTICOAGULANT WAS NOT DETECTED IN THIS      SPECIMEN WITHIN THE LIMITS OF THE TESTING REPERTOIRE.      If the clinical picture is strongly suggestive of an antiphospholipid      syndrome, recommend anticardiolipin and beta-2-glycoprotein (IgG and      IgM) antibody tests.      Leela Franks M.D.  [phone]      5/2/2013             INR =  0.93 N = 0.86-1.14  (No additional charge)      TT = 15.7 N = 13.0-19.0 sec  (No additional charge)            APTT'S:    Seconds      Reagent =  Stago LA      Normal  =  38      Patient  =  35      1:2 Mix  =  N/A      Reference =  31-43             DILUTE MADELINE VIPER VENOM TEST:      DRVVT Screen Ratio = 0.76 Normal = <1.21         IMMUNOGLOBULIN G SUBCLASSES       Component Value Range    IGG 1030  695 - 1620 mg/dL    IgG1 488  300 - 856 mg/dL    IgG2 325  158 - 761 mg/dL    IgG3 47  24 - 192 mg/dL    IgG4 18  11 - 86 mg/dL   SUNNY ANTIBODY PANEL       Component Value Range    Ribonucleic Protein IgG Antibody 0      Smith Antibody IgG 1      SSA (RO) Antibody IgG 4      SSB (LA) Antibody IgG 0      Scleroderma Antibody IgG 0     BETA 2 GLYCOPROTEIN ANTIBODIES IGG IGM       Component Value Range    Beta-2-Glycopro IgG 1      Beta-2-Glycopro IgM 5     CYCLIC CIT PEPT IGG       Component Value Range    Cyclic Cit Pept IgG/IgA    <20 UNITS    Value: <20      Interpretation:  Negative   DNA DOUBLE STRANDED ANTIBODIES       Component Value Range    DNA-ds    0 - 29 IU/mL    Value: <15      Interpretation:  Negative       Component      Latest Ref [facility] 3/11/2014   Sodium      133 - 144 mmol/L 137   Potassium      3.4 - 5.3 mmol/L 4.6   Chloride      94 - 109 mmol/L 97   Carbon Dioxide      20 - 32 mmol/L 20   Anion Gap      6 - 17 mmol/L 20 (H)   Glucose      60 - 99 mg/dL 243 (H)   Urea Nitrogen      5 - 24 mg/dL 35 (H)   Creatinine      0.52 - 1.04 mg/dL 1.47 (H)   GFR Estimate      >60 mL/min/1.7m2 38 (L)   GFR Estimate If Black      >60 mL/min/1.7m2 46 (L)   Calcium      8.5 - 10.4 mg/dL 9.7   Bilirubin Total      0.2 - 1.3 mg/dL 0.3   Albumin      3.9 - 5.1 g/dL 4.8   Protein Total      6.8 - 8.8 g/dL 7.9   Alkaline Phosphatase      40 - 150 U/L 73   ALT      0 - 50 U/L 35   AST      0 - 45 U/L 30   Color Urine       Yellow   Appearance Urine       Clear   Glucose Urine      NEG mg/dL 500 (A)   Bilirubin  Urine      NEG Negative   Ketones Urine      NEG mg/dL Negative   Specific Gravity Urine      1.003 - 1.035 1.020   Blood Urine      NEG Negative   pH Urine      5.0 - 7.0 pH 5.5   Protein Albumin Urine      NEG mg/dL Negative   Urobilinogen Urine      0.2 - 1.0 EU/dL 0.2   Nitrite Urine      NEG Negative   Leukocyte Esterase Urine      NEG Negative   Source       Midstream Urine   WBC      4.0 - 11.0 10e9/L 14.0 (H)   RBC Count      3.8 - 5.2 10e12/L 5.02   Hemoglobin      11.7 - 15.7 g/dL 12.4   Hematocrit      35.0 - 47.0 % 37.1   MCV      78 - 100 fl 74 (L)   MCH      26.5 - 33.0 pg 24.7 (L)   MCHC      31.5 - 36.5 g/dL 33.4   RDW      10.0 - 15.0 % 15.3 (H)   Platelet Count      150 - 450 10e9/L 420       Component      Latest Ref Rn 3/25/2014   Sodium      133 - 144 mmol/L 137   Potassium      3.4 - 5.3 mmol/L 3.7   Chloride      94 - 109 mmol/L 100   Carbon Dioxide      20 - 32 mmol/L 21   Anion Gap      6 - 17 mmol/L 16   Glucose      60 - 99 mg/dL 214 (H)   Urea Nitrogen      5 - 24 mg/dL 20   Creatinine      0.52 - 1.04 mg/dL 1.02   GFR Estimate      >60 mL/min/1.7m2 58 (L)   GFR Estimate If Black      >60 mL/min/1.7m2 70   Calcium      8.5 - 10.4 mg/dL 9.5   Phosphorus      2.5 - 4.5 mg/dL 3.7   Albumin      3.9 - 5.1 g/dL 4.6     Component      Latest Ref Rng 4/30/2014   CRP Inflammation      0.0 - 8.0 mg/L 10.0 (H)   Sed Rate      0 - 20 mm/h 39 (H)   Rheumatoid Factor      <20 IU/mL <20   Glucose-6-PO4 Dehydrogenase       17.7     Component      Latest Ref Rng 6/11/2014 7/15/2014   WBC      4.0 - 11.0 10e9/L 11.0    RBC Count      3.8 - 5.2 10e12/L 4.74    Hemoglobin      11.7 - 15.7 g/dL 11.8    Hematocrit      35.0 - 47.0 % 36.1    MCV      78 - 100 fl 76 (L)    MCH      26.5 - 33.0 pg 24.9 (L)    MCHC      31.5 - 36.5 g/dL 32.7    RDW      10.0 - 15.0 % 13.9    Platelet Count      150 - 450 10e9/L 434    Diff Method       Automated Method    % Neutrophils       59.2    % Lymphocytes       31.6    %  Monocytes       5.9    % Eosinophils       2.6    % Basophils       0.5    % Immature Granulocytes       0.2    Absolute Neutrophil      1.6 - 8.3 10e9/L 6.5    Absolute Lymphocytes      0.8 - 5.3 10e9/L 3.5    Absolute Monoctyes      0.0 - 1.3 10e9/L 0.7    Absolute Eosinophils      0.0 - 0.7 10e9/L 0.3    Absolute Basophils      0.0 - 0.2 10e9/L 0.1    Abs Immature Granulocytes      0 - 0.4 10e9/L 0.0    Sodium      133 - 144 mmol/L 138 140   Potassium      3.4 - 5.3 mmol/L 3.9 3.8   Chloride      94 - 109 mmol/L 97 103   Carbon Dioxide      20 - 32 mmol/L 26 22   Anion Gap      6 - 17 mmol/L 14.9 15   Glucose      60 - 99 mg/dL 111 (H) 163 (H)   Urea Nitrogen      5 - 24 mg/dL 28 (H) 15   Creatinine      0.52 - 1.04 mg/dL 1.10 (H) 0.99   GFR Estimate      >60 mL/min/1.7m2 53 (L) 60 (L)   GFR Estimate If Black      >60 mL/min/1.7m2 64 72   Calcium      8.5 - 10.4 mg/dL 10.3 9.3   Bilirubin Total      0.2 - 1.3 mg/dL 0.6 0.2   Albumin      3.9 - 5.1 g/dL 5.1 4.2   Protein Total      6.8 - 8.8 g/dL 9.0 (H) 7.2   Alkaline Phosphatase      40 - 150 U/L 87 69   ALT      0 - 50 U/L 37 33   AST      0 - 45 U/L 40 26   Color Urine       Straw    Appearance Urine       Clear    Glucose Urine      NEG mg/dL Negative    Bilirubin Urine      NEG Negative    Ketones Urine      NEG mg/dL Negative    Specific Gravity Urine      1.003 - 1.035 1.005    Blood Urine      NEG Negative    pH Urine      5.0 - 7.0 pH 5.0    Protein Albumin Urine      NEG mg/dL Negative    Urobilinogen mg/dL      0.0 - 2.0 mg/dL Normal    Nitrite Urine      NEG Negative    Leukocyte Esterase Urine      NEG Negative    Source       Midstream Urine    Lipase      20 - 250 U/L 403 (H)    CRP Inflammation      0.0 - 8.0 mg/L <5.0    N-Terminal Pro Bnp      0 - 125 pg/mL  55   Hemoglobin A1C      4.3 - 6.0 %  7.0 (H)     Component      Latest Ref Rng 11/12/2014   Sodium      133 - 144 mmol/L 135   Potassium      3.4 - 5.3 mmol/L 3.8   Chloride      94 - 109  mmol/L 104   Carbon Dioxide      20 - 32 mmol/L 24   Anion Gap      3 - 14 mmol/L 7   Glucose      70 - 99 mg/dL 125 (H)   Urea Nitrogen      7 - 30 mg/dL 17   Creatinine      0.52 - 1.04 mg/dL 1.18 (H)   GFR Estimate      >60 mL/min/1.7m2 49 (L)   GFR Estimate If Black      >60 mL/min/1.7m2 59 (L)   Calcium      8.5 - 10.1 mg/dL 9.8   WBC      4.0 - 11.0 10e9/L 11.1 (H)   RBC Count      3.8 - 5.2 10e12/L 4.05   Hemoglobin      11.7 - 15.7 g/dL 9.8 (L)   Hematocrit      35.0 - 47.0 % 30.6 (L)   MCV      78 - 100 fl 76 (L)   MCH      26.5 - 33.0 pg 24.2 (L)   MCHC      31.5 - 36.5 g/dL 32.0   RDW      10.0 - 15.0 % 15.5 (H)   Platelet Count      150 - 450 10e9/L 484 (H)   CT CHEST PULMONARY EMBOLISM W CONTRAST 5/20/2015 4:57 PM  HISTORY: Pain. SOB. Elevated d-dimer.   TECHNIQUE: 65 mL Isovue 370. Axial images with coronal  reconstructions.  COMPARISON: None.  FINDINGS: Calcified granuloma with surrounding scarring in the  posterolateral left upper lobe. Triangular shaped opacity at the right  lung base in the lateral right middle lobe could represent some  scarring, atelectasis or infiltrate. There is also some scarring or  atelectasis in the posteromedial right lung base. Lungs otherwise  clear.  The pulmonary arteries are well opacified. No CT evidence for acute  pulmonary embolus. No aortic dissection.  Diffuse fatty infiltration of the liver.  IMPRESSION  IMPRESSION:   1. No pulmonary embolus identified.  2. Small focus of atelectasis, infiltrate or scarring in the lateral  right middle lobe.  3. Old granulomatous disease.  4. Otherwise negative.  SILVERIO VAZQUEZ MD  Component      Latest Ref Rng 3/27/2015   WBC      4.0 - 11.0 10e9/L 11.8 (H)   RBC Count      3.8 - 5.2 10e12/L 4.53   Hemoglobin      11.7 - 15.7 g/dL 11.0 (L)   Hematocrit      35.0 - 47.0 % 33.8 (L)   MCV      78 - 100 fl 75 (L)   MCH      26.5 - 33.0 pg 24.3 (L)   MCHC      31.5 - 36.5 g/dL 32.5   RDW      10.0 - 15.0 % 15.4 (H)   Platelet  Count      150 - 450 10e9/L 419   Diff Method       Automated Method   % Neutrophils       75.3   % Lymphocytes       17.4   % Monocytes       4.4   % Eosinophils       2.5   % Basophils       0.2   % Immature Granulocytes       0.2   Absolute Neutrophil      1.6 - 8.3 10e9/L 8.9 (H)   Absolute Lymphocytes      0.8 - 5.3 10e9/L 2.1   Absolute Monoctyes      0.0 - 1.3 10e9/L 0.5   Absolute Eosinophils      0.0 - 0.7 10e9/L 0.3   Absolute Basophils      0.0 - 0.2 10e9/L 0.0   Abs Immature Granulocytes      0 - 0.4 10e9/L 0.0   Creatinine      0.52 - 1.04 mg/dL 1.12 (H)   GFR Estimate      >60 mL/min/1.7m2 52 (L)   GFR Estimate If Black      >60 mL/min/1.7m2 63   Iron      35 - 180 ug/dL 25 (L)   Iron Binding Cap      240 - 430 ug/dL 346   Iron Saturation Index      15 - 46 % 7 (L)   Albumin      3.4 - 5.0 g/dL 4.0   ALT      0 - 50 U/L 24   AST      0 - 45 U/L 15   CRP Inflammation      0.0 - 8.0 mg/L 28.0 (H)   Sed Rate      0 - 20 mm/h 81 (H)   Rheumatoid Factor      <20 IU/mL <20   Vitamin D Deficiency screening      30 - 75 ug/L 44   Ferritin      8 - 252 ng/mL 34     Component      Latest Ref Rn 7/29/2015   Sodium      133 - 144 mmol/L 137   Potassium      3.4 - 5.3 mmol/L 3.8   Chloride      94 - 109 mmol/L 106   Carbon Dioxide      20 - 32 mmol/L 23   Anion Gap      3 - 14 mmol/L 8   Glucose      70 - 99 mg/dL 148 (H)   Urea Nitrogen      7 - 30 mg/dL 17   Creatinine      0.52 - 1.04 mg/dL 1.02   GFR Estimate      >60 mL/min/1.7m2 58 (L)   GFR Estimate If Black      >60 mL/min/1.7m2 70   Calcium      8.5 - 10.1 mg/dL 9.0   Bilirubin Total      0.2 - 1.3 mg/dL 0.5   Albumin      3.4 - 5.0 g/dL 4.2   Protein Total      6.8 - 8.8 g/dL 7.4   Alkaline Phosphatase      40 - 150 U/L 64   ALT      0 - 50 U/L 23   AST      0 - 45 U/L 19     Results for FAVIO MARTINEZ (MRN [id number]) as of 10/30/2015 17:00   Ref. Range 8/21/2012 09:06 5/22/2013 14:22 4/14/2014 12:06 9/11/2014 12:35 12/4/2014 16:38   TSH Latest Range:  0.40-4.00 mU/L 0.83 0.43 0.27 (L) 0.23 (L) 0.22 (L)     Component      Latest Ref Rng 10/30/2015   WBC      4.0 - 11.0 10e9/L 13.4 (H)   RBC Count      3.8 - 5.2 10e12/L 4.76   Hemoglobin      11.7 - 15.7 g/dL 12.4   Hematocrit      35.0 - 47.0 % 37.9   MCV      78 - 100 fl 80   MCH      26.5 - 33.0 pg 26.1 (L)   MCHC      31.5 - 36.5 g/dL 32.7   RDW      10.0 - 15.0 % 14.5   Platelet Count      150 - 450 10e9/L 324   Diff Method       Automated Method   % Neutrophils       67.7   % Lymphocytes       22.7   % Monocytes       6.3   % Eosinophils       2.7   % Basophils       0.4   % Immature Granulocytes       0.2   Absolute Neutrophil      1.6 - 8.3 10e9/L 9.1 (H)   Absolute Lymphocytes      0.8 - 5.3 10e9/L 3.1   Absolute Monoctyes      0.0 - 1.3 10e9/L 0.9   Absolute Eosinophils      0.0 - 0.7 10e9/L 0.4   Absolute Basophils      0.0 - 0.2 10e9/L 0.1   Abs Immature Granulocytes      0 - 0.4 10e9/L 0.0   Creatinine      0.52 - 1.04 mg/dL 1.21 (H)   GFR Estimate      >60 mL/min/1.7m2 47 (L)   GFR Estimate If Black      >60 mL/min/1.7m2 57 (L)   Iron      35 - 180 ug/dL 70   Iron Binding Cap      240 - 430 ug/dL 428   Iron Saturation Index      15 - 46 % 16   Albumin      3.4 - 5.0 g/dL 4.6   ALT      0 - 50 U/L 29   AST      0 - 45 U/L 21   CRP Inflammation      0.0 - 8.0 mg/L <2.9   Sed Rate      0 - 20 mm/h 8   Vitamin D Deficiency screening      20 - 75 ug/L 46   Ferritin      8 - 252 ng/mL 18   TSH      0.40 - 4.00 mU/L 0.49   T4 Free      0.76 - 1.46 ng/dL 1.06   Free T3      2.3 - 4.2 pg/mL 2.8     Component      Latest Ref Rng 11/17/2015   Testosterone Total      8 - 60 ng/dL 19   Sex Hormone Binding Globulin      30 - 135 nmol/L 32   Free Testosterone Calculated      0.11 - 0.58 ng/dL 0.34   Vitamin A       0.61   Retinol Palmitate       <0.02 . . .   Vitamin A Interp       Normal . . .   Thyroglobulin Antibody      <40 IU/mL <20   Thyroid Peroxidase Antibody      <35 IU/mL 31   DHEA Sulfate      35 - 430  ug/dL 101   Zinc       68     Component      Latest Ref Rng 1/27/2016   Sodium      133 - 144 mmol/L 135   Potassium      3.4 - 5.3 mmol/L 4.0   Chloride      94 - 109 mmol/L 104   Carbon Dioxide      20 - 32 mmol/L 24   Anion Gap      3 - 14 mmol/L 7   Glucose      70 - 99 mg/dL 88   Urea Nitrogen      7 - 30 mg/dL 20   Creatinine      0.52 - 1.04 mg/dL 1.13 (H)   GFR Estimate      >60 mL/min/1.7m2 51 (L)   GFR Estimate If Black      >60 mL/min/1.7m2 62   Calcium      8.5 - 10.1 mg/dL 9.4   Bilirubin Total      0.2 - 1.3 mg/dL 0.7   Albumin      3.4 - 5.0 g/dL 4.3   Protein Total      6.8 - 8.8 g/dL 7.7   Alkaline Phosphatase      40 - 150 U/L 66   ALT      0 - 50 U/L 24   AST      0 - 45 U/L 18   Cholesterol      <200 mg/dL 112   Triglycerides      <150 mg/dL 100   HDL Cholesterol      >49 mg/dL 34 (L)   LDL Cholesterol Calculated      <100 mg/dL 58   Non HDL Cholesterol      <130 mg/dL 78   N-Terminal Pro Bnp      0 - 125 pg/mL 29   Hemoglobin A1C      4.3 - 6.0 % 7.0 (H)   Amylase      30 - 110 U/L 60   Lipase      73 - 393 U/L 394 (H)   Troponin I ES      0.000 - 0.045 ug/L <0.015 . . .     Component      Latest Ref Rng 2/24/2016   Angiotensin Converting Enzyme       <5 (L) . . .   Neutrophil Cytoplasmic IgG Antibody       <1:20 . . .   Sed Rate      0 - 20 mm/h 86 (H)     Copath Report      Patient Name: FAVIO MARTINEZ   MR#: [id number]   Specimen #: [id number]   Collected: 3/15/2016   Received: 3/15/2016   Reported: 3/17/2016 13:33   Ordering Phy(s): PARVEEN ENRIQUEZ     ORIGINAL REPORT FOLLOWS ADDENDUM  ADDENDUM     TO ORIGINAL REPORT   Status: Signed Out   Date Ordered:3/18/2016   Date Complete:3/18/2016   Date Reported:3/18/2016 12:06   Signed Out By: Marianne Godfrey MD     INTERPRETATION:   This addendum is done to incorporate the results of fungal (GMS) stains   done on the specimen.  Specimen is negative for fungal organisms.  The   original final diagnosis remains unchanged.  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  Negative for malignancy(Please   see microscopic description)     Electronically signed out by:     Marianne Godfrey MD     CLINICAL HISTORY:   right orbital mass     GROSS:   The specimen is received labeled \"right orbital biopsy\" and consists of   red-tan nodule measuring 1.5 x 0.9 x 0.6 cm with one smooth side and   opposite rough side consistent with periosteum.  The specimen is   bisected revealing homogenous pale tan fleshy cut surface.  Touch   preparations are prepared, air dried and fixed, portion of specimen is   submitted in RPMI for possible lymphoma workup [facility],   ([email or web address], [place], [place] ).  The remainder is entirely submitted.   (Dictated by: Marianne Godfrey MD 3/15/2016 04:45 PM)     MICROSCOPIC:     Specimen consists of portion of lacrimal gland with acute and chronic   inflammation.  Focal area of microabscess formation associated with   small areas of necrosis are also present.  Inflammation is seen   extending to the periorbital adipose tissue forming panniculitis.   Specimen is negative for malignancy.  Samples sent for immunophenotyping   to [facility], ([email or web address], [place], [place] ) reveals no evidence   of monoclonality or aberrant antigen expression.  A GMS (fungal) stain   is pending and results will be reported as an addendum.     CPT Codes:   A: 60412-AM2, 21285-KJEPQ, SOH, 74600-AARKJ, 40692-UCTS     TESTING LAB LOCATION:   [address]  [address] [phone]     COLLECTION SITE:   Client[facility]   [facility]            Component      Latest Ref Rng 4/29/2016   Nucleated RBCs      0 /100 0   Absolute Neutrophil     "  1.6 - 8.3 10e9/L 8.9 (H)   Absolute Lymphocytes      0.8 - 5.3 10e9/L 3.2   Absolute Monocytes      0.0 - 1.3 10e9/L 0.8   Absolute Eosinophils      0.0 - 0.7 10e9/L 0.2   Absolute Basophils      0.0 - 0.2 10e9/L 0.1   Abs Immature Granulocytes      0 - 0.4 10e9/L 0.1   Absolute Nucleated RBC       0.0   IGG      695 - 1620 mg/dL 836   IgG1      300 - 856 mg/dL 280 (L)   IgG2      158 - 761 mg/dL 277   IgG3      24 - 192 mg/dL 35   IgG4      11 - 86 mg/dL 16   RNP Antibody IgG      0.0 - 0.9 AI <0.2 . . .   Smith SUNNY Antibody IgG      0.0 - 0.9 AI <0.2 . . .   SSA (Ro) (SUNNY) Antibody, IgG      0.0 - 0.9 AI <0.2 . . .   SSB (La) (SUNNY) Antibody, IgG      0.0 - 0.9 AI <0.2 . . .   Scleroderma Antibody Scl-70 SUNNY IgG      0.0 - 0.9 AI <0.2 . . .   Cholesterol      <200 mg/dL 154   Triglycerides      <150 mg/dL 220 (H)   HDL Cholesterol      >49 mg/dL 42 (L)   LDL Cholesterol Calculated      <100 mg/dL 67   Non HDL Cholesterol      <130 mg/dL 111   M Tuberculosis Result      NEG Negative   M Tuberculosis Antigen Value       0.00   Albumin      3.4 - 5.0 g/dL 4.3   ALT      0 - 50 U/L 30   AST      0 - 45 U/L 10   Complement C3      76 - 169 mg/dL 157   Complement C4      15 - 50 mg/dL 32   CRP Inflammation      0.0 - 8.0 mg/L <2.9   Sed Rate      0 - 20 mm/h 2   DNA-ds      <10 IU/mL 1   Cyclic Citrullinated Peptide Antibody, IgG      <7 U/mL 1   Rheumatoid Factor      <20 IU/mL <20   Proteinase 3 Antibody IgG      0.0 - 0.9 AI <0.2 . . .   Myeloperoxidase Antibody IgG      0.0 - 0.9 AI 2.5 (H)   Vitamin D Deficiency screening      20 - 75 ug/L 24   Hemoglobin A1C      4.3 - 6.0 % 7.9 (H)   ALLI by IFA IgG       1:40 (A) . . .     Component      Latest Ref Rng & Units 4/7/2017   WBC      4.0 - 11.0 10e9/L 11.3 (H)   RBC Count      3.8 - 5.2 10e12/L 4.77   Hemoglobin      11.7 - 15.7 g/dL 12.5   Hematocrit      35.0 - 47.0 % 38.7   MCV      78 - 100 fl 81   MCH      26.5 - 33.0 pg 26.2 (L)   MCHC      31.5 - 36.5 g/dL  32.3   RDW      10.0 - 15.0 % 13.2   Platelet Count      150 - 450 10e9/L 347   Diff Method       Automated Method   % Neutrophils      % 67.1   % Lymphocytes      % 22.9   % Monocytes      % 6.2   % Eosinophils      % 3.0   % Basophils      % 0.4   % Immature Granulocytes      % 0.4   Nucleated RBCs      0 /100 0   Absolute Neutrophil      1.6 - 8.3 10e9/L 7.5   Absolute Lymphocytes      0.8 - 5.3 10e9/L 2.6   Absolute Monocytes      0.0 - 1.3 10e9/L 0.7   Absolute Eosinophils      0.0 - 0.7 10e9/L 0.3   Absolute Basophils      0.0 - 0.2 10e9/L 0.1   Abs Immature Granulocytes      0 - 0.4 10e9/L 0.1   Absolute Nucleated RBC       0.0   IGG      695 - 1620 mg/dL 962   IgG1      300 - 856 mg/dL Test sent to [facility]. See ARMISC.   IgG2      158 - 761 mg/dL Test sent to [facility]. See ARMISC.   IgG3      24 - 192 mg/dL Test sent to [facility]. See ARMISC.   IgG4      11 - 86 mg/dL Test sent to [facility]. See ARMISC.   Result       SEE NOTE . . .   Test Name       IGG SUBCLASSES   Send Outs Misc Test Code       69478   Send Outs Misc Test Specimen       Serum   Creatinine      0.52 - 1.04 mg/dL 1.20 (H)   GFR Estimate      >60 mL/min/1.7m2 47 (L)   GFR Estimate If Black      >60 mL/min/1.7m2 57 (L)   Creatinine Urine      mg/dL    Albumin Urine mg/L      mg/L    Albumin Urine mg/g Cr      0 - 25 mg/g Cr    Myeloperoxidase Antibody IgG      0.0 - 0.9 AI 2.9 (H)   Proteinase 3 Antibody IgG      0.0 - 0.9 AI <0.2 . . .   Neutrophil Cytoplasmic IgG Antibody       1:80 (A) . . .   Angiotensin Converting Enzyme       <5 (L) . . .   Lab Scanned Result       TPMT GENOTYPE-Scanned   Vitamin C       56   ALT      0 - 50 U/L 23   Albumin      3.4 - 5.0 g/dL 4.3   AST      0 - 45 U/L 18   CRP Inflammation      0.0 - 8.0 mg/L 3.7   Sed Rate      0 - 30 mm/h 17   Vitamin D Deficiency screening      20 - 75 ug/L 40   Hemoglobin A1C      4.3 - 6.0 %      Component      Latest Ref Rng & Units 4/26/2017   WBC      4.0 - 11.0 10e9/L 11.8 (H)   RBC Count       3.8 - 5.2 10e12/L 4.23   Hemoglobin      11.7 - 15.7 g/dL 11.2 (L)   Hematocrit      35.0 - 47.0 % 35.0   MCV      78 - 100 fl 83   MCH      26.5 - 33.0 pg 26.5   MCHC      31.5 - 36.5 g/dL 32.0   RDW      10.0 - 15.0 % 13.4   Platelet Count      150 - 450 10e9/L 304   Diff Method       Automated Method   % Neutrophils      % 66.2   % Lymphocytes      % 22.7   % Monocytes      % 6.5   % Eosinophils      % 3.9   % Basophils      % 0.4   % Immature Granulocytes      % 0.3   Nucleated RBCs      0 /100 0   Absolute Neutrophil      1.6 - 8.3 10e9/L 7.8   Absolute Lymphocytes      0.8 - 5.3 10e9/L 2.7   Absolute Monocytes      0.0 - 1.3 10e9/L 0.8   Absolute Eosinophils      0.0 - 0.7 10e9/L 0.5   Absolute Basophils      0.0 - 0.2 10e9/L 0.1   Abs Immature Granulocytes      0 - 0.4 10e9/L 0.0   Absolute Nucleated RBC       0.0   IGG      695 - 1620 mg/dL    IgG1      300 - 856 mg/dL    IgG2      158 - 761 mg/dL    IgG3      24 - 192 mg/dL    IgG4      11 - 86 mg/dL    Result          Test Name          Send Outs Misc Test Code          Send Outs Misc Test Specimen          Creatinine      0.52 - 1.04 mg/dL 1.24 (H)   GFR Estimate      >60 mL/min/1.7m2 46 (L)   GFR Estimate If Black      >60 mL/min/1.7m2 55 (L)   Creatinine Urine      mg/dL 121   Albumin Urine mg/L      mg/L <5   Albumin Urine mg/g Cr      0 - 25 mg/g Cr Unable to calculate due to low value   Myeloperoxidase Antibody IgG      0.0 - 0.9 AI    Proteinase 3 Antibody IgG      0.0 - 0.9 AI    Neutrophil Cytoplasmic IgG Antibody          Angiotensin Converting Enzyme          Lab Scanned Result          Vitamin C          ALT      0 - 50 U/L 25   Albumin      3.4 - 5.0 g/dL 4.1   AST      0 - 45 U/L 15   CRP Inflammation      0.0 - 8.0 mg/L    Sed Rate      0 - 30 mm/h    Vitamin D Deficiency screening      20 - 75 ug/L    Hemoglobin A1C      4.3 - 6.0 % 7.8 (H)   EXAMINATION: CT CHEST ABDOMEN PELVIS W/O CONTRAST, 4/7/2017 2:59 PM     TECHNIQUE: Helical CT  images from the thoracic inlet through the  symphysis pubis were obtained without IV contrast.     COMPARISON: CT chest on 5/20/2015. CT abdomen pelvis on 6/11/2014     HISTORY: Granuloma of right orbit. Concern for malignancy, lymphoma.     Additional history from the EMR: 50-year-old female with rheumatoid  arthritis and fibromyalgia. Presented on April 2016 with new right  orbital inflammatory mass, biopsied showed panniculitis without  infection or malignancy. Some intermittent swelling around her right  orbit.     FINDINGS:     Chest: Cardiac size is not enlarged. No pericardial effusion.  Calcified subcentimeter mediastinal and left hilar lymph nodes. No  thoracic adenopathy. Coronary artery calcifications/stents.  Benign-appearing coarse calcifications in the thyroid, better  described on ultrasound thyroid from 9/13/2016.     Central airways are patent. No pneumothorax or pleural effusion.  Calcified pulmonary granuloma in the posterior left upper lobe,  benign. Small focus of subpleural fibrosis and cysts along the  anterior lateral right lower lobe (image 96 series 6).     Abdomen and pelvis: Cholecystectomy. The liver, adrenal glands,  kidneys, spleen, pancreas, stomach, duodenum are without focal  abnormality on these noncontrast images.     No abdominal aortic aneurysm. No suspicious adenopathy in the abdomen  or pelvis. Bladder is intact. Calcified fibroid off the uterine  fundus. IUD in the uterus.     No focal bowel wall thickening or obstruction. No free air or free  fluid. Appendix is normal. Small periumbilical hernia.     Bones and soft tissues: No suspicious osseous lesions. Unremarkable  superficial soft tissues.         IMPRESSION: No evidence of malignancy in the chest, abdomen, or  pelvis.        I have personally reviewed the examination and initial interpretation  and I agree with the findings.     CONNIE BRICE      Component      Latest Ref Rng & Units 6/1/2017   WBC      4.0 - 11.0  10e9/L 12.0 (H)   RBC Count      3.8 - 5.2 10e12/L 5.18   Hemoglobin      11.7 - 15.7 g/dL 13.8   Hematocrit      35.0 - 47.0 % 41.0   MCV      78 - 100 fl 79   MCH      26.5 - 33.0 pg 26.6   MCHC      31.5 - 36.5 g/dL 33.7   RDW      10.0 - 15.0 % 14.8   Platelet Count      150 - 450 10e9/L 322   Diff Method       Automated Method   % Neutrophils      % 76.7   % Lymphocytes      % 16.5   % Monocytes      % 4.6   % Eosinophils      % 1.9   % Basophils      % 0.3   Absolute Neutrophil      1.6 - 8.3 10e9/L 9.2 (H)   Absolute Lymphocytes      0.8 - 5.3 10e9/L 2.0   Absolute Monocytes      0.0 - 1.3 10e9/L 0.6   Absolute Eosinophils      0.0 - 0.7 10e9/L 0.2   Absolute Basophils      0.0 - 0.2 10e9/L 0.0   Color Urine       Yellow   Appearance Urine       Clear   Glucose Urine      NEG mg/dL Negative   Bilirubin Urine      NEG Negative   Ketones Urine      NEG mg/dL Negative   Specific Gravity Urine      1.003 - 1.035 1.010   pH Urine      5.0 - 7.0 pH 5.5   Protein Albumin Urine      NEG mg/dL Negative   Urobilinogen Urine      0.2 - 1.0 EU/dL 0.2   Nitrite Urine      NEG Negative   Blood Urine      NEG Negative   Leukocyte Esterase Urine      NEG Negative   Source       Midstream Urine   WBC Urine      0 - 2 /HPF O - 2   RBC Urine      0 - 2 /HPF O - 2   Squamous Epithelial /LPF Urine      FEW /LPF Few   Bacteria Urine      NEG /HPF Few (A)   Creatinine      0.52 - 1.04 mg/dL 1.19 (H)   GFR Estimate      >60 mL/min/1.7m2 48 (L)   GFR Estimate If Black      >60 mL/min/1.7m2 58 (L)   Protein Random Urine      g/L <0.05   Protein Total Urine g/gr Creatinine      0 - 0.2 g/g Cr Unable to calculate due to low value   Myeloperoxidase Antibody IgG      0.0 - 0.9 AI 1.9 (H)   Proteinase 3 Antibody IgG      0.0 - 0.9 AI <0.2 . . .   ALT      0 - 50 U/L 29   AST      0 - 45 U/L 18   Albumin      3.4 - 5.0 g/dL 4.6   CRP Inflammation      0.0 - 8.0 mg/L 6.1   Sed Rate      0 - 30 mm/h 13   Creatinine Urine Random      mg/dL 54    Neutrophil Cytoplasmic IgG Antibody       <1:20 . . .   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      Comparison: MRI brain 5/29/2013      Technique:   Orbits: Axial and coronal T1-weighted, and coronal T2-weighted images  obtained without intravenous contrast. Post-intravenous contrast  (using gadolinium) sagittal FLAIR, were obtained with fat-saturation,  focused on the orbits.  Brain: Axial susceptibility-weighted and FLAIR sequences were obtained  of the whole brain without intravenous contrast, and postcontrast  axial T1-weighted images were obtained through the whole brain.   Contrast: 7.5mL Gadavist     Findings:  New asymmetric enlargement of the right lacrimal gland and enhancement  of the right lacrimal gland with surrounding ill-defined crescentic T2  hyperintense signal and enhancement within the right superior  superotemporal orbit, predominantly involving the extraconal space  with slight intraconal extension. No definite associated restricted  diffusion. No discrete extraocular muscle enlargement. Normal  symmetric optic nerve signal. Cavernous sinuses and orbital apices are  normal. Globes are normal.     Whole brain images are symmetric minimal leukoaraiosis and generalized  parenchymal volume loss. Ventricles are not enlarged. No intracranial  hemorrhage, mass effect, midline shift or abnormal extra axial fluid  collection. No abnormal foci of intracranial enhancement or restricted  diffusion. Paranasal sinuses and mastoid air cells are clear.            Impression:    New abnormal enlargement of the right lacrimal gland with surrounding  ill-defined T2 hyperintense signal and enhancement centered in the  superotemporal right orbital fat,  which may represent   infectious/inflammatory dacryadenitis, orbital pseudotumor, lymphoma,  carcinoma, sarcoidosis or IgG4 disease..     I have personally reviewed the examination and initial interpretation  and I agree with the findings.     PATRICIA BRANTLEY MD      Component      Latest Ref Rng & Units 6/1/2017   WBC      4.0 - 11.0 10e9/L 12.0 (H)   RBC Count      3.8 - 5.2 10e12/L 5.18   Hemoglobin      11.7 - 15.7 g/dL 13.8   Hematocrit      35.0 - 47.0 % 41.0   MCV      78 - 100 fl 79   MCH      26.5 - 33.0 pg 26.6   MCHC      31.5 - 36.5 g/dL 33.7   RDW      10.0 - 15.0 % 14.8   Platelet Count      150 - 450 10e9/L 322   Diff Method       Automated Method   % Neutrophils      % 76.7   % Lymphocytes      % 16.5   % Monocytes      % 4.6   % Eosinophils      % 1.9   % Basophils      % 0.3   Absolute Neutrophil      1.6 - 8.3 10e9/L 9.2 (H)   Absolute Lymphocytes      0.8 - 5.3 10e9/L 2.0   Absolute Monocytes      0.0 - 1.3 10e9/L 0.6   Absolute Eosinophils      0.0 - 0.7 10e9/L 0.2   Absolute Basophils      0.0 - 0.2 10e9/L 0.0   Color Urine       Yellow   Appearance Urine       Clear   Glucose Urine      NEG mg/dL Negative   Bilirubin Urine      NEG Negative   Ketones Urine      NEG mg/dL Negative   Specific Gravity Urine      1.003 - 1.035 1.010   pH Urine      5.0 - 7.0 pH 5.5   Protein Albumin Urine      NEG mg/dL Negative   Urobilinogen Urine      0.2 - 1.0 EU/dL 0.2   Nitrite Urine      NEG Negative   Blood Urine      NEG Negative   Leukocyte Esterase Urine      NEG Negative   Source       Midstream Urine   WBC Urine      0 - 2 /HPF O - 2   RBC Urine      0 - 2 /HPF O - 2   Squamous Epithelial /LPF Urine      FEW /LPF Few   Bacteria Urine      NEG /HPF Few (A)   Creatinine      0.52 - 1.04 mg/dL 1.19 (H)   GFR Estimate      >60 mL/min/1.7m2 48 (L)   GFR Estimate If Black      >60 mL/min/1.7m2 58 (L)   Protein Random Urine      g/L <0.05   Protein Total Urine g/gr Creatinine      0 - 0.2 g/g Cr Unable to  "calculate due to low value   Myeloperoxidase Antibody IgG      0.0 - 0.9 AI 1.9 (H)   Proteinase 3 Antibody IgG      0.0 - 0.9 AI <0.2 . . .   ALT      0 - 50 U/L 29   AST      0 - 45 U/L 18   Albumin      3.4 - 5.0 g/dL 4.6   CRP Inflammation      0.0 - 8.0 mg/L 6.1   Sed Rate      0 - 30 mm/h 13   Creatinine Urine Random      mg/dL 54   Neutrophil Cytoplasmic IgG Antibody       <1:20 . . .   Creatinine Urine      mg/dL 54       Patient Name: FAVIO MARTINEZ   MR#: [id number]   Specimen #: [id number]   Collected: 8/4/2017   Received: 8/4/2017   Reported: 8/9/2017 16:19   Ordering Phy(s): CRISTHIAN FLORES     For improved result formatting, select 'View Enhanced Report Format'   under Linked Documents section.     SPECIMEN(S):   Eye, right lacrimal gland     FINAL DIAGNOSIS:   Eye, right, \"lacrimal gland\", biopsy   - Dense fibrosis and patchy inflammation   - No residual lacrimal gland seen     COMMENT:   Sections show tissue involved by dense fibrosis and patchy inflammation.   There is no morphologic or immunohistochemical evidence of lymphoma. By   separate report, concurrent flow cytometry studies ([id number]),   performed at the [facility], show polytypic B cells and no   aberrant immunophenotype on T cells. Immunohistochemical stains for IgG   and IgG-4 were performed, which provide no support for IgG-4-related   disease. Some of these changes may be related to prior surgery. Sjögren   disease is not excluded. There is no evidence of malignancy. Dr. Max Conway has reviewed this case and concurs.     Electronically signed out by:     Antonella Beth M.D.       ROS:  A comprehensive ROS was done, positives are per HPI.        HISTORY REVIEW:  Past Medical History:   Diagnosis Date     Abnormal glandular Papanicolaou smear of cervix 1992     Allergic rhinitis     Allergy, airborne subst     Arthritis      ASCVD (arteriosclerotic cardiovascular disease)      Chronic pain      Chronic pancreatitis " (H)     idiopathic, Tx: PPI, antioxidants, pancreatic enzymes     Common migraine      Coronary artery disease      Costochondritis      Costochondritis      Difficulty in walking(719.7)      Dyspnea on exertion      Ectasia, mammary duct     [facility], persistent nipple discharge     Elevated fasting glucose      Gastro-oesophageal reflux disease      Hernia      History of angina      Hyperlipidemia LDL goal < 100      Hypertension goal BP (blood pressure) < 140/90     Essential hypertension     Iron deficiency anemia      Ischemic cardiomyopathy      Menorrhagia      Migraine headaches      Mild persistent asthma      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 Matias, neg w/u, Rx'd w/prednisone     Ventral hernia, unspecified, without mention of obstruction or gangrene 2012     Past Surgical History:   Procedure Laterality Date     C ECHO HEART XTHORACIC,COMPLETE, W/O DOPPLER  04    [facility] WNL, EF 60%     C/SECTION, LOW TRANSVERSE           CARDIAC SURGERY      cardiac stent- recent in 16; 4 other stents     DILATION AND CURETTAGE N/A 2016    Procedure: DILATION AND CURETTAGE;  Surgeon: Nahed Butler MD;  Location:  OR      UGI ENDOSCOPY W EUS  08    Dr. Pastrana, possible chronic pancreatitis, fatty liver      HERNIA REPAIR  2012    done at [facility] [place], [place]     INSERT INTRAUTERINE DEVICE N/A 2016    Procedure: INSERT INTRAUTERINE DEVICE;  Surgeon: Nahed Butler MD;  Location: [facility] OR     INT UTERINE FIBRIOD EMBOLIZATION  10/29/2014     LAPAROSCOPIC CHOLECYSTECTOMY  08    Dr. Arnol GRUBBS TX, CERVICAL      s/p LEEP     ORBITOTOMY Right 3/15/2016    Procedure: ORBITOTOMY;  Surgeon: Myron Cyr MD;  Location:  [facility]     ORBITOTOMY Right 2017    Procedure: ORBITOTOMY;  RIGHT ORBITOTOMY AND BIOPSY;  Surgeon: Charis Holbrook MD;  Location:  [facility]     REPAIR PTOSIS Right 2017    Procedure: REPAIR PTOSIS;  RIGHT UPPER LID PTOSIS REPAIR;  Surgeon: Myron Cyr MD;  Location: [facility]     UPPER GI ENDOSCOPY  10/21/08    mild gastritis, Dr. Hidalgo     Family History   Problem Relation Age of Onset     HEART DISEASE Father 50     heart attack     CEREBROVASCULAR DISEASE Father      DIABETES Father      Hypertension Father      Depression Father      C.A.D. Father      Hypertension Mother      Arthritis Mother      HEART DISEASE Mother      long qt syndrome     Thyroid Disease Mother      C.A.D. Mother      HEART DISEASE Brother 15     MI at 15, 16.      DIABETES Maternal Uncle      Hypertension Maternal Aunt      Hypertension Maternal Uncle      Arthritis Brother      he passed away, had severe arthritis at age 11     Arthritis Maternal Uncle      Eye Disorder Maternal Uncle      cataracts     Neurologic Disorder Sister      migraines     Neurologic Disorder Sister      migraines     Respiratory Son      asthma     CEREBROVASCULAR DISEASE Maternal Uncle      C.A.D. Brother      Family History Negative Other      neg for RA, SLE     Unknown/Adopted No family hx of      unknown neurological issues in her family, mother was adopted     Skin Cancer No family hx of      No known family hx of skin cancer     Social History     Social History     Marital status: Single     Spouse name: N/A      Number of children: 1     Years of education: N/A     Occupational History      None      Social History Main Topics     Smoking status: Former Smoker     Packs/day: 0.20     Years: 1.00     Types: Cigarettes     Quit date: 2/1/2016     Smokeless tobacco: Never Used     Alcohol use No     Drug use: No     Sexual activity: Not Currently     Other Topics Concern     Parent/Sibling W/ Cabg, Mi Or Angioplasty Before 65f 55m? Yes     Social History Narrative    Balanced Diet - sometimes    Osteoporosis Prevention Measures - Dairy servings per day: 2 servings weekly    Regular Exercise -  Yes Describe walking 4 times a week    Dental Exam - NO    Seatbelts used - Yes    Self Breast Exam - Yes    Abuse: Current or Past (Physical, Sexual or Emotional)- No    Do you have any concerns about STD's -  No    Do you feel safe in your environment - No    Guns stored in the home - No    Sunscreen used - Yes    Lipids -  YES - Date: 053102    Glucose -  YES - Date: 012804    Eye Exam - YES - Date: one year ago    Colon Cancer Screening - No    Hemoccults - NO    Pap Test -  YES - Date: 070904, remote history of LEEP    Mammography - YES - Date: last spring, would like to discuss, needs a referral to [facility] [place]    DEXA - NO    Immunizations reviewed and up to date - Yes, last td given in 1997 or 1998     Patient Active Problem List   Diagnosis     Seasonal affective disorder (H)     Allergic rhinitis     PCOS (polycystic ovarian syndrome)     Moderate persistent asthma     Chronic pancreatitis (H)     Hypertension goal BP (blood pressure) < 140/90     Common migraine     NSTEMI (non-ST elevated myocardial infarction) (H)     Hyperlipidemia LDL goal <70     ASCVD (arteriosclerotic cardiovascular disease)     GERD (gastroesophageal reflux disease)     Ischemic cardiomyopathy     Hypertensive heart disease     Uterine leiomyoma     Iron deficiency anemia     Costochondritis     Vitamin D deficiency     Breast cancer  screening     Spinal stenosis in cervical region     Fibromyalgia     Seronegative rheumatoid arthritis (H)     Type 2 diabetes, HbA1C goal < 8% (H)     Type 2 diabetes mellitus with other specified complication (H)     Hyperlipidemia associated with type 2 diabetes mellitus (H)     Hypertension associated with diabetes (H)     Overweight with body mass index (BMI) 25.0-29.9     Tobacco use disorder     Telogen effluvium     CAD S/P percutaneous coronary angioplasty     Status post coronary angiogram       Pregnancy Hx: She is . All misscarriages were in first trimester. H/o OC use in the past. 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    PMHx, FHx, SHx were reviewed, unchanged.      Outpatient Encounter Prescriptions as of 2018   Medication Sig Dispense Refill     traMADol (ULTRAM) 50 MG tablet Take 1 tablet (50 mg) by mouth every 8 hours as needed for moderate pain 60 tablet 3     insulin glargine (LANTUS) 100 UNIT/ML injection Inject 40 Units Subcutaneous daily (with breakfast)       spironolactone (ALDACTONE) 50 MG tablet Take 1 tablet (50 mg) by mouth daily . Take additional 0.5 tablets by mouth once daily as needed for weight gain. 45 tablet 11     lisinopril-hydrochlorothiazide (PRINZIDE/ZESTORETIC) 20-25 MG per tablet Take 1 tablet by mouth daily 30 tablet 11     clopidogrel (PLAVIX) 75 MG tablet Take 1 tablet (75 mg) by mouth daily 30 tablet 11     nitroGLYcerin (NITROSTAT) 0.4 MG sublingual tablet Place 1 tablet (0.4 mg) under the tongue every 5 minutes as needed for chest pain 25 tablet 1     pravastatin (PRAVACHOL) 40 MG tablet Take 1 tablet (40 mg) by mouth daily 30 tablet 11     ketoconazole (NIZORAL) 2 % cream   3     metroNIDAZOLE (METROCREAM) 0.75 % cream CECI EXT TO FACE BID  2     folic acid (FOLVITE) 1 MG tablet TK 1 T PO D  2     azelastine (ASTELIN) 0.1 % spray U 2 SPRAYS IEN BID  0     SYMBICORT 80-4.5 MCG/ACT Inhaler INHALE 2 PUFFS PO BID RINSE AND EXPECTORATE AFTER   3     cyclobenzaprine (FLEXERIL) 10 MG tablet Take 1 tablet (10 mg) by mouth 2 times daily as needed for muscle spasms 180 tablet 0     hydroxychloroquine (PLAQUENIL) 200 MG tablet Take 2 tablets (400 mg) by mouth daily 180 tablet 1     BASAGLAR 100 UNIT/ML injection Inject 40 Units Subcutaneous daily (Patient not taking: Reported on 12/20/2017) 12 mL 2     bacitracin ophthalmic ointment Apply to incision line three times daily. (Patient not taking: Reported on 12/20/2017) 3.5 g 0     COMPRESSION STOCKINGS 2 each daily (Patient not taking: Reported on 12/20/2017) 4 each 2     COMPRESSION STOCKINGS 2 each daily Apply one 10-15 mmHg compression stocking to each lower extgmierty during the day and remove before bedtime. (Patient not taking: Reported on 12/20/2017) 4 each 2     insulin pen needle (BD MARCK U/F) 32G X 4 MM USE DAILY OR AS DIRECTED 300 each 3     insulin glargine (BASAGLAR KWIKPEN) 100 UNIT/ML injection Inject 40 Units Subcutaneous daily (Patient not taking: Reported on 12/20/2017) 18 mL 3     ranitidine (ZANTAC) 150 MG tablet Take 1 tablet (150 mg) by mouth 2 times daily 60 tablet 2     lidocaine (XYLOCAINE) 5 % ointment Apply 1 g topically 2 times daily as needed for moderate pain (Patient not taking: Reported on 12/20/2017) 50 g 5     ferrous gluconate (FERGON) 324 (38 FE) MG tablet Take 1 tablet (324 mg) by mouth 2 times daily (Patient taking differently: Take 1 tablet by mouth daily ) 180 tablet 1     blood glucose monitoring (ONE TOUCH DELICA) lancets Use to test blood sugars 4 times daily or as directed. (Patient not taking: Reported on 12/20/2017) 4 Box 3     vitamin D (ERGOCALCIFEROL) 50800 UNIT capsule Take 1 capsule (50,000 Units) by mouth every 7 days Need a Vitamin D level in 2 months. (Patient taking differently: Take 50,000 Units by mouth every 7 days Need a Vitamin D level in 2 months.) 8 capsule 0     blood glucose monitoring (NO BRAND SPECIFIED) meter device kit Use to test blood sugar 4  X times daily or as directed. (Patient not taking: Reported on 12/20/2017) 1 kit 0     blood glucose monitoring (NO BRAND SPECIFIED) test strip 1 strip by In Vitro route 4 times daily Test as directed. Please dispense three months and three months of refills. Thank you. (Patient not taking: Reported on 12/20/2017) 360 each 3     diphenhydrAMINE (BENADRYL) 25 MG capsule TK 1 TO 2 CS PO QHS  4     EPIPEN 2-RIKY 0.3 MG/0.3ML injection INJECT 0.3 MG INTO THE MUSCLE PRF ANAPHYALAXIS  0     AEROSPAN 80 MCG/ACT AERS INHALE 2 PUFFS PO BID.  RINSE MOUTH AFTERWARDS  4     Magnesium Oxide -Mg Supplement 250 MG TABS TK 1 T PO BID. REDUCE IF STOOLS LOOSEN  11     nystatin (MYCOSTATIN) 296226 UNITS TABS tablet TK 2 TS PO BID  0     albuterol (2.5 MG/3ML) 0.083% neb solution INL 1 VIAL VIA NEBULIZATION Q 4 TO 6 HOURS PRN  1     dicyclomine (BENTYL) 20 MG tablet Take 1 tablet (20 mg) by mouth 4 times daily as needed 60 tablet 5     sucralfate (CARAFATE) 1 GM tablet Take 1 tablet (1 g) by mouth 4 times daily as needed 120 tablet 3     fluocinolone (SYNALAR) 0.01 % solution Apply topically daily as needed       mometasone (NASONEX) 50 MCG/ACT spray Spray 2 sprays into both nostrils daily       Ondansetron HCl (ZOFRAN PO)        metFORMIN (GLUCOPHAGE-XR) 500 MG 24 hr tablet Take 2 tablets (1,000 mg) by mouth daily (with dinner) 60 tablet 11     montelukast (SINGULAIR) 10 MG tablet Take 1 tablet (10 mg) by mouth At Bedtime 30 tablet 1     acetaminophen (TYLENOL) 325 MG tablet Take 1-2 tablets (325-650 mg) by mouth every 6 hours as needed for pain (headache) 250 tablet 0     metroNIDAZOLE (NORITATE) 1 % cream Apply topically daily       desonide (DESOWEN) 0.05 % cream Apply topically 2 times daily       ketoconazole (NIZORAL) 2 % shampoo Apply topically daily as needed       fluocinonide (LIDEX) 0.05 % external solution Apply topically 2 times daily To areas of itch on the scalp as needed. 60 mL 11     albuterol (PROAIR HFA, PROVENTIL  "HFA, VENTOLIN HFA) 108 (90 BASE) MCG/ACT inhaler Inhale 2 puffs into the lungs every 6 hours as needed for shortness of breath / dyspnea or wheezing 3 Inhaler 1     calcium carbonate (TUMS) 500 MG chewable tablet Take 3-4 chew tab by mouth daily as needed.       fexofenadine (ALLEGRA) 180 MG tablet Take 1 tablet by mouth daily as needed. 90 tablet 3     olopatadine (PATANOL) 0.1 % ophthalmic solution Place 1 drop into both eyes 2 times daily as needed for allergies. 5 mL 12     No facility-administered encounter medications on file as of 2/16/2018.        Allergies   Allergen Reactions     Amoxicillin-Pot Clavulanate      Augmentin      Unknown possible hives and edema     Azithromycin      Diatrizoate Other (See Comments)     Pt wants this listed because she is allergic to shellfish      Imitrex [Sumatriptan]      Severe face/neck/chest tightness and flushing side effects      Penicillins Hives     Unknown      Pork Allergy      Stomach pain, cramping, diarrhea, itching, nausea and headaches     Shellfish Allergy Hives and Swelling     Sulfa Drugs Hives and Swelling     Zithromax [Azithromycin Dihydrate] Swelling     Unknown          Ph.E:    Vitals:    02/16/18 1632   BP: 118/75   Pulse: 91   Temp: 98.7  F (37.1  C)   TempSrc: Oral   Weight: 74.4 kg (164 lb)   Height: 1.6 m (5' 3\")           Constitutional: WD/WN. Pleasant. In no acute distress. Obese.  Eyes: Swelling around R eye improved since last visit, ptosis is improved. EOMI  HEENT: No oral ulcers or thrush. Normal salivary pool.  Neck: No cervical LAP, + thyromegaly  Chest: Clear to auscultation bilaterally  CV: RRR, no murmurs/ rubs or gallops. No edema, clubbing or cyanosis.   GI: Abdomen is soft and non tender.  MS: No synovitis or joint tenderness. Cool joints. No joint deformities. Full ROM of the joints. No nodules. +Fibromyalgia trigger points.  Skin: No livedo, periungual erythema, digital ulcers or nail changes. + alopecia with scales, facial " malar erythema (looks like seborrhoic dermatitis).  Neuro: A&O x 3. Grossly non focal, muscular power 5/5 in all ext  Psych: NL affect and mood    Assessment/ plan:    1-Seropositive non-erosive RA (arthritis, AM stiffness, high CRP, RF 26 but re-check neg 3/2015 on HCQ) Dx 5/2013, FMS, new pleuritic CP 3/20-15-5/2015 resolved on steroids. She is on  mg bid since 5/2014. She tolerates it well and it's helping some but not enough to control all her pain. Continues having flare ups of joint pain, swelling also has FMS. Joint sx are getting worse. Had high ESR/CRP in 3/2015 and new pleuritic CP between 3/2015-5/2015 which resolved after taking medrol dose pack prescribed 5/20/2015. Her pleuritic CP could be related to her RA. Then stopped tramadol as it blames it for recent episodes of CP.    In the past, MTX was recommended, she declined.    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 Etiology of mass unclear, but it's inflammatory and related to pt's underlying autoimmune disease (inflammatory arthritis, serositis). 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    Her work up showed borderline + ALLI and slightly elevated MPO. I told her prednisone is not good for her diabetes and weight gain. Recommended a trial of MTX as next step. Discussed risks on details. I called her neuro-ophthalmologist at last visit who agreed with trial of MTX (she suspects pseudo-sarcoidosis or sarcoid like disease but no granuloma and ACE not elevated).     Unfortunately despite all my efforts to explain risks vs benefits (more than risks) of MTX as steroid sparing gent, Janine declined to try MTX. I was very concerned about her R orbital inflammatory mass as there is high chance of flare up off steroids and  steroid causes her weigh gain and uncontrolled diabetes. I even offered her other steroid sparing gents like imuran. She declined that as well.    Her inflammation around R orbit has recurred now. On 4/7/2017, I spent quite amount of time discussing a trial of MTX. After discussing risks/benefits, she agreed to try it.     She is s/p Tx with MTX 10 mg po qwk 4/2017-5/2017. No benefits and more GI SEs, more hair loss and headaches since start of MTX. No benefits. I called and spoke with her neuro-ophthalmologist who recommended a second opinion from neuro-ophthalmology at the . I suspect her presentation to be ANCA vasculitis related but wanted to repeat imaging and get neuro-ophthalmology eval here. She was recommended to have repeat biopsy to r/o lymphoma.    In 5/2017, prescribed her prednisone 40-30-20-10 mg a day each for 3 days then stop (she declined higher dose and longer taper despite my concern for worsening swelling around orbit), Her R campos-orbital edema significantly improved. MTX was stopped in 5/2017 given side effects. Plan was to start imuran 50 mg po qd (NL TPMT); however we decided to hold off till she gets biopsy done.    Repeat R lacrimal gland biopsy in 8/2017 showed non specific inflammation, it ruled out lymphoma. Her R orbital swelling is improved but still present. After her biopsy I contacted her to schedule a f/u visit with me to discuss plan of care but she declined till today's visit.    She did not tolerate AZA because of GI SEs.    She continues to have R campos-orbital swelling, fatigue and joint pain (part of it is due to FMS). 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 Even with rare risk of PML  without confirmation of vasculitis, 2 biopsies have ruled out lymphoma and showed inflammation. This is definitely an inflammatory process and is steroid responsive, she had local  kenalog inj in 8/2017 at the time of biopsy which has helped. Given her diabetes and long term SE of prednisone, highly recommend steroid sparing agent to prevent progression/recurrence of R campos-orbital swelling. Since she did not tolerate MTX and AZA, recommend rituximab as next step.     I spent 30 minutes discussing test results, plan of care, rituximab SEs including rare risk of PML. She is not ready to try rituximab but is going to think about it.        PLAN:    Recommend rituximab 1 gram q2wk x 2. Labs today.    CT chest/abdomen/pelvis 4/2017 was neg for malignancy. 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 Repeat MPO was positive in 6/2017.    Continue accupuncture.     My impression is that her arthralgias are a combination of IA, fibromyalgia and chronic pain.  Stopped HCQ at last visit, shortly after resumed taking it as arthralgia recurred. Continue HCQ. Eye exam is updated.      2-Fad pads. She was seen by endocrinology and cushing was ruled out in 4/2014. Was advised to do f/u for enlarged thyroid/thyroid nodules.    3-Hair loss. F/U with dermatology    4-Chronic pain. F/U with pain clinic    5-Concerns of subclinical hyperthyroidism: TSH is barely minimal, chart review shows that those lowest levels are since April 2014. At last visit, discussed Endocrinology ref which pt wants to hold off in this visit.     6-Vit D deficiency. Was replaced with vit D 50, 000 units po qwk x 12 wk then 2000 units qd      PLAN: Follow up in May    MEDICATION CHANGES: as above      Orders Placed This Encounter   Procedures     ALT     Albumin level     CBC with platelets differential     Creatinine     CRP inflammation     Erythrocyte sedimentation rate auto     Routine UA with micro reflex to culture     Vitamin D Deficiency     SSA Ro SUNNY Antibody IgG     SSB La SUNNY Antibody IgG     AST     Antineutrophil cytoplasmic Marline IgG     Vasculitis panel     Hepatitis B surface antigen     Hepatitis B core  antibody     Hepatitis C antibody     M Tuberculosis by [facility]             Majo Mckinnon MD

## 2018-02-16 NOTE — LETTER
Patient:  Janine Cornell  :   1965  MRN:     [id number]        Ms.Lisa CHELLE Cornell  [address] [facility] [id number]        2018    Dear ,    We are writing to inform you of your test results. Overall stable labs. MPO (vasculitis marker) is still elevated.      Resulted Orders   ALT   Result Value Ref Range    ALT 21 0 - 50 U/L   Albumin level   Result Value Ref Range    Albumin 4.0 3.4 - 5.0 g/dL   CBC with platelets differential   Result Value Ref Range    WBC 10.3 4.0 - 11.0 10e9/L    RBC Count 4.88 3.8 - 5.2 10e12/L    Hemoglobin 12.7 11.7 - 15.7 g/dL    Hematocrit 38.3 35.0 - 47.0 %    MCV 79 78 - 100 fl    MCH 26.0 (L) 26.5 - 33.0 pg    MCHC 33.2 31.5 - 36.5 g/dL    RDW 12.8 10.0 - 15.0 %    Platelet Count 340 150 - 450 10e9/L    Diff Method Automated Method     % Neutrophils 63.9 %    % Lymphocytes 26.0 %    % Monocytes 5.9 %    % Eosinophils 3.5 %    % Basophils 0.5 %    % Immature Granulocytes 0.2 %    Nucleated RBCs 0 0 /100    Absolute Neutrophil 6.6 1.6 - 8.3 10e9/L    Absolute Lymphocytes 2.7 0.8 - 5.3 10e9/L    Absolute Monocytes 0.6 0.0 - 1.3 10e9/L    Absolute Eosinophils 0.4 0.0 - 0.7 10e9/L    Absolute Basophils 0.1 0.0 - 0.2 10e9/L    Abs Immature Granulocytes 0.0 0 - 0.4 10e9/L    Absolute Nucleated RBC 0.0    Creatinine   Result Value Ref Range    Creatinine 1.06 (H) 0.52 - 1.04 mg/dL    GFR Estimate 55 (L) >60 mL/min/1.7m2      Comment:      Non  GFR Calc    GFR Estimate If Black 66 >60 mL/min/1.7m2      Comment:       GFR Calc   CRP inflammation   Result Value Ref Range    CRP Inflammation 5.2 0.0 - 8.0 mg/L   Erythrocyte sedimentation rate auto   Result Value Ref Range    Sed Rate 17 0 - 30 mm/h   Routine UA with micro reflex to culture   Result Value Ref Range    Color Urine Yellow     Appearance Urine Slightly Cloudy     Glucose Urine 50 (A) NEG^Negative mg/dL    Bilirubin Urine Negative NEG^Negative    Ketones Urine 5  (A) NEG^Negative mg/dL    Specific Gravity Urine 1.024 1.003 - 1.035    Blood Urine Negative NEG^Negative    pH Urine 6.0 5.0 - 7.0 pH    Protein Albumin Urine Negative NEG^Negative mg/dL    Urobilinogen mg/dL 0.0 0.0 - 2.0 mg/dL    Nitrite Urine Negative NEG^Negative    Leukocyte Esterase Urine Negative NEG^Negative    Source Midstream Urine     WBC Urine 1 0 - 2 /HPF    RBC Urine 3 (H) 0 - 2 /HPF    Bacteria Urine Few (A) NEG^Negative /HPF    Squamous Epithelial /HPF Urine 4 (H) 0 - 1 /HPF    Mucous Urine Present (A) NEG^Negative /LPF   Vitamin D Deficiency   Result Value Ref Range    Vitamin D Deficiency screening 41 20 - 75 ug/L      Comment:      Season, race, dietary intake, and treatment affect the concentration of   25-hydroxy-Vitamin D. Values may decrease during winter months and increase   during summer months. Values 20-29 ug/L may indicate Vitamin D insufficiency   and values <20 ug/L may indicate Vitamin D deficiency.  Vitamin D determination is routinely performed by an immunoassay specific for   25 hydroxyvitamin D3.  If an individual is on vitamin D2 (ergocalciferol)   supplementation, please specify 25 OH vitamin D2 and D3 level determination by   LCMSMS test [id number].     SSA Ro SUNNY Antibody IgG   Result Value Ref Range    SSA (Ro) (SUNNY) Antibody, IgG <0.2 0.0 - 0.9 AI      Comment:      Negative  Antibody index (AI) values reflect qualitative changes in antibody   concentration that cannot be directly associated with clinical condition or   disease state.     SSB La SUNNY Antibody IgG   Result Value Ref Range    SSB (La) (SUNNY) Antibody, IgG <0.2 0.0 - 0.9 AI      Comment:      Negative  Antibody index (AI) values reflect qualitative changes in antibody   concentration that cannot be directly associated with clinical condition or   disease state.     AST   Result Value Ref Range    AST 15 0 - 45 U/L   Antineutrophil cytoplasmic Marline IgG   Result Value Ref Range    Neutrophil Cytoplasmic IgG Antibody  <1:20 <1:20      Comment:      (Note)  The ANCA IFA is <1:20; therefore, no further testing will   be performed.  INTERPRETIVE INFORMATION: Anti-Neutrophil Cyto Ab, IgG  Neutrophil Cytoplasmic Antibodies (C-ANCA = granular   cytoplasmic staining, P-ANCA = perinuclear staining) are   found in the serum of over 90 percent of patients with   certain necrotizing systemic vasculitides, and usually in   less than 5 percent of patients with collagen vascular   disease or arthritis.  Performed by [facility],  [address] [place] [id number] [phone]  [email or web address], Ameya Akers MD, Lab. Director     Hepatitis B surface antigen   Result Value Ref Range    Hep B Surface Agn Nonreactive NR^Nonreactive   Hepatitis B core antibody   Result Value Ref Range    Hepatitis B Core Marline Nonreactive NR^Nonreactive   Hepatitis C antibody   Result Value Ref Range    Hepatitis C Antibody Nonreactive NR^Nonreactive      Comment:      Assay performance characteristics have not been established for newborns,   infants, and children     M Tuberculosis by Quantiferon Gold   Result Value Ref Range    M Tuberculosis Result Negative NEG^Negative    M Tuberculosis Antigen Value 0.00 IU/mL      Comment:      This is a qualitative test.  The TB antigen IU/mL value is required for   documentation on certain government reporting forms but this value should not   be used to monitor disease progression or response to therapy.  Diagnosing or excluding tuberculosis disease, and assessing the probability of   LTBI, require a combination of epidemiological, historical, medical and   diagnostic findings that should be taken into account when interpreting   QuantiFERON TB results.     Vasculitis panel   Result Value Ref Range    Myeloperoxidase Antibody IgG 1.7 (H) 0.0 - 0.9 AI      Comment:      Positive  Antibody index (AI) values reflect qualitative changes in antibody   concentration that cannot be directly associated with clinical condition or    disease state.      Proteinase 3 Antibody IgG <0.2 0.0 - 0.9 AI      Comment:      Negative  Antibody index (AI) values reflect qualitative changes in antibody   concentration that cannot be directly associated with clinical condition or   disease state.         Majo Mckinnon MD

## 2018-02-16 NOTE — LETTER
2/16/2018       RE: Janine Cornell  [address] [facility] [id number]     Dear Colleague,    Thank you for referring your patient, Janine Cornell, to the [facility] RHEUMATOLOGY at [facility]. Please see a copy of my visit note below.    Rheumatology F/U Note    Last visit note: 6/15/2017    Today's visit date: 2/16/2018    Reason for visit: RA, Fibromyalgia     HPI from last visit    Ms. Cornell is a 51 yo AAF who was referred to our clinic for evaluation and management of her joint pain in setting of positive RF 26.    Her joint symptoms first started more than a year ago, but over last 6-8 months fluctuating some good and bad days . All her joints are involved. Over last 5 months, hands became swollen, warm and painful. Tylenol does not help. Ketoprofen did not help. Was told to avoid NSAIDs given ACS. Reports AM/PM stiffness x 2-3 hr, can't make full fist when wakes up in AM.    She feels tired all the time. Reports worsening hair loss and unchanged  facial rash. Gets red sore flaky rash over cheeks across her face which is intermittent diagnosed Rosacea and is prescribed metronidazole gel which she has not started using. Reports occasional oral ulcers. Hands feel cold with red discoloration last winter. Has occasional dysphagia to both solids and liquid. Has dry eyes, is using OTC allergy eyedrops. Has chronic abdominal pain. Has h/o pancreatitis. Sometime has anxiety. Occasionally has numbness, tingling in fingers/toes. Has back and spine issues, her whole back and neck hurts, different areas at different time. She is wondering if she has FMS. Has hard time sleeping, has never been diagnosed with BRENNA. She does not know if he snores. She was found to have slightly positive RF in 2/2013. Has microcytic anemia.     Her arthralgia gets worse with drop in temperature. Reports body ache. Activity makes her pain worse. Her joint swelling isintermittent. Her body pain and  joint pain is the same. Reports AM stiffness x 3 hr.    She has been doing acupuncture x few months and it is helping with her pain. She thinks that the combination of plaquenil and acupuncture is helpful but overall she notice 30% in her symptoms relief.     Takes tylenol and tramadol on occasion for pain.    She decided to use different formulation of metformin which helped with her abdominal pain. I referred her to GI for evaluation of elevated lipase and abdominal pain. She decided to see GI in the future.       She is on  mg qd since 5/2014, tolerates it well and it helps her some. Denies taking any gabapentin due to chest pain and headches. She has had referral her for water aerobics, but she can't begin until she's healed from recent surgery in 10/2014. She thinks HCQ is helping but not enough to control all her pain, reports 2-3 hr of AM stiffness with ongoing diffuse arthralgia/myalgia along with intermittent swelling of hands. She does see her acupuncture person and it helps with her pain, has not started water aerobics yet. Has got her flu shot.       10/2015: Reports having pleuritic CP in 3/2015, was prescribed Lidoderm patches first. It did not help. Took prednisone (?dose) by her own, pain got better but it recurred off prednisone and gradually got worse to the point that she could not stand the pain anymore, was seen in ER in 5/2015, was treated with medrol dose pack which helped. Reports pain over hips, knees, ankles and fingers. Pain gets worse with walking. Reports myalgia, swelling of the arms. Pain over neck, shoulders. Has AM stiffness x 3-4 hr. Fingers swell up and become painful. Can't remember if steroid helped with joint pain. She had headaches, severe CP when she took tramadol and gabapentin and stopped taking them, sx resolved but she can't tell which one caused SEs but thinks that probably it was gabapentin. She has good days and tries to be more active but activity aggravates the  pain. Headaches are intermittent. HCQ helps some not enough to control all the pain. No HCQ SEs. Had eye exam around July 2015. Flexeril helps but PCP wants to change flexeril to zanaflex, reporting that she is NOT comfortable with the change. Acupuncture still helps an she continued to  do that. Concerned about fat pads she has in supraclavicular area, has h/o thyroid nodules. Continues having hair loss, takes iron for iron deficiency.     2/2016: She has 2 major complaints today, increased joint pain/swelling in hands/feet and recent Dx of optic neuritis in R eye, reports having similar problem about 20 yr ago, now recurred. Has a spot in R eye vision x long term, was seen by ophthalmology few days ago and was told to have optic neuritis. Gets joint pain, muscle pain. Can't  objects, hands are swollen. Knees, hips and ankles are painful. Reports more arthralgia. AM stiffness/ pain is 3-4 hr. She wants me to talk to her ophthalmologist directly.      She had botox inj for migraines which did not help her. She is going to have angiogram next wk, had to take more nitro for CP recently.       4/29/2016: She reports being on steroid taper x 3 since last visit. Reportedly, had orbit MRI, it showed swelling/inflamamtion of R lacrimal glands. She had painful swelling of her R eye, cause her headaches. Botox inj made her headaches worse. She is being dealing with this since 1/2016, with severe headaches and pain/swelling around R eye. Had biopsy in 3/2016. She is frustrated as prednisone causes her weight gain and her BG is difficult to control on it.    5/2016: At last visit, was prescribed MTX 10 mg po qwk to take along with FA 1 mg qd. She decided not to take MTX, is afraid of side effects, believes all these medications would harm her. She also believes that botox caused her inflammation around R eye. No major flare up of per-orbital inflamamtion since last visit but continues to have swelling around her R  eye.      11/2016: Reports diffuse body ache, arthralgia, myalgia especially with weather change. No major flare up of campos-orbital inflammation but her face/around R eye swells up from time to time. Reports 2 episodes of CP over past 2 mo, nitro sometimes helps and sometimes does not help. She has asthma and costochondritis and she has hard time to distinguish the origin of pain. Sometimes knees and fingers swell up. Her stiffness is sometimes all day.    4/2017:  Reports having sinusitis since last visit. Her R eye is dry and is using eyedrops to keep it moist. Reports having pain in ears. Was treated with antibiotics by PCP, it got better then it got worse. Reports catching infections easily. Then started having pressure over eyes with pain/headaches (exact start date is unknown). Pain gradually got worse and became persistent. Had sinus CT.     4/7/2017: Having a flare up of swelling, pain around her R orbit. Has not tried MTX yet.      5/2017: On MTX 10 mg po qwk since 4/7/2017, reports increased stomach pain and nausea and new headaches since start of MTX and it's not helping. Pain/swelling around R orbit is worse.    6/2017: She finished prednisone taper prescribed at last visit, R campos-orbital swelling significantly improved. 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    Today: R campos-orbital swelling has improved. Repeat R lacrimal gland biopsy in 8/2017 showed non specific inflammation with no lymphoma. She is off steroid. Did not tolerate AZA due to N/V and abdominal pain.        Her records were reviewed.        Component      Latest Ref Rng 2/28/2013 2/28/2013          12:14 PM 12:14 PM   WBC      4.0 - 11.0 10e9/L     RBC Count      3.8 - 5.2 10e12/L     Hemoglobin      11.7 - 15.7 g/dL     Hematocrit      35.0 - 47.0 %     MCV      78 - 100 fl     MCH       26.5 - 33.0 pg     MCHC      31.5 - 36.5 g/dL     RDW      10.0 - 15.0 %     Platelet Count      150 - 450 10e9/L     Specimen Description           Lyme Screen IgG and IgM           Vitamin D Deficiency screening      30 - 75 ug/L     Ferritin      10 - 300 ng/mL     Sed Rate      0 - 20 mm/h     ALLI Screen by EIA      <1.0     Rheumatoid Factor      0 - 14 IU/mL     CK Total      30 - 225 U/L  78   Uric Acid      2.5 - 7.5 mg/dL 6.7      Component      Latest Ref [facility] 2/28/2013          12:14 PM   WBC      4.0 - 11.0 10e9/L    RBC Count      3.8 - 5.2 10e12/L    Hemoglobin      11.7 - 15.7 g/dL    Hematocrit      35.0 - 47.0 %    MCV      78 - 100 fl    MCH      26.5 - 33.0 pg    MCHC      31.5 - 36.5 g/dL    RDW      10.0 - 15.0 %    Platelet Count      150 - 450 10e9/L    Specimen Description       Serum   Lyme Screen IgG and IgM       Test value: <0.75....Interpretation: Negative....If you highly suspect Lyme . . .   Vitamin D Deficiency screening      30 - 75 ug/L    Ferritin      10 - 300 ng/mL    Sed Rate      0 - 20 mm/h    ALLI Screen by EIA      <1.0    Rheumatoid Factor      0 - 14 IU/mL    CK Total      30 - 225 U/L    Uric Acid      2.5 - 7.5 mg/dL      Component      Latest Ref [facility] 2/28/2013 2/28/2013 2/28/2013 2/28/2013          12:14 PM 12:14 PM 12:14 PM 12:14 PM   WBC      4.0 - 11.0 10e9/L       RBC Count      3.8 - 5.2 10e12/L       Hemoglobin      11.7 - 15.7 g/dL       Hematocrit      35.0 - 47.0 %       MCV      78 - 100 fl       MCH      26.5 - 33.0 pg       MCHC      31.5 - 36.5 g/dL       RDW      10.0 - 15.0 %       Platelet Count      150 - 450 10e9/L       Specimen Description             Lyme Screen IgG and IgM             Vitamin D Deficiency screening      30 - 75 ug/L       Ferritin      10 - 300 ng/mL    10   Sed Rate      0 - 20 mm/h   23 (H)    ALLI Screen by EIA      <1.0  <1.0 . . .     Rheumatoid Factor      0 - 14 IU/mL 26 (H)      CK Total      30 - 225 U/L       Uric Acid       2.5 - 7.5 mg/dL         Component      Latest Ref Rng 2/28/2013 2/28/2013          12:14 PM 12:14 PM   WBC      4.0 - 11.0 10e9/L 14.1 (H)    RBC Count      3.8 - 5.2 10e12/L 4.55    Hemoglobin      11.7 - 15.7 g/dL 10.7 (L)    Hematocrit      35.0 - 47.0 % 33.3 (L)    MCV      78 - 100 fl 73 (L)    MCH      26.5 - 33.0 pg 23.5 (L)    MCHC      31.5 - 36.5 g/dL 32.1    RDW      10.0 - 15.0 % 18.1 (H)    Platelet Count      150 - 450 10e9/L 407    Specimen Description           Lyme Screen IgG and IgM           Vitamin D Deficiency screening      30 - 75 ug/L  32   Ferritin      10 - 300 ng/mL     Sed Rate      0 - 20 mm/h     ALLI Screen by EIA      <1.0     Rheumatoid Factor      0 - 14 IU/mL     CK Total      30 - 225 U/L     Uric Acid      2.5 - 7.5 mg/dL          MRI CERVICAL SPINE WITHOUT CONTRAST 4/3/2013 12:47 PM    HISTORY: Cervicalgia. Degeneration of cervical intervertebral disc.  MRI cervical spine. Evaluate bilateral supraclavicular lymph nodes.  Clinical enlargement.    TECHNIQUE: Multiplanar multisequence MRI of the cervical spine  without gadolinium contrast.    COMPARISON: None.    FINDINGS: The patient has a developmentally small central canal.  Images of the cervical cord reveals small areas of T2 hyperintensity  within the cervical cord. There is a small area of high signal  intensity at the C1 level. There is also a small area of high signal  intensity at the C6-C7 level. The area of high signal at C6-C7 is  located at an area of central spinal stenosis. This may be due to  myelomalacia. The area of high signal at C1-C2 is indeterminate.    C2-C3: Normal disc, facet joints, spinal canal and neural foramina.    C3-C4: Normal disc, facet joints, spinal canal and neural foramina.    C4-C5: Normal disc, facet joints, spinal canal and neural foramina.    C5-C6: There is degeneration of the disc. There is a mild annular  disc bulge. The central canal is mild-moderately narrowed. The  residual AP  diameter of the central spinal canal measures  approximately 9 mm.    C6-C7: There is degeneration of the disc. There is loss of disc space  height. There is a diffuse annular disc bulge. There are associated  posterior osteophytes. There are severe central spinal stenosis at  this level. The residual AP diameter of the central spinal canal is  only about 6 mm. There is significant flattening of the cord. There  is high signal in the cord at this level consistent with  myelomalacia. There is moderate to severe right foraminal stenosis  due to and uncinate spur.    C7-T1: Normal disc, facet joints, spinal canal and neural foramina.            Result Impression       IMPRESSION:  1. Severe central spinal stenosis at C6-C7 due to a developmentally  small canal and due to a bulging disc and associated posterior  osteophyte. There is flattening of the cord at this level and high  signal in the cord at this level consistent with myelomalacia.  2. There is a small, indeterminate 2-3 mm area of high signal  intensity in the cord at the C1 level. This could be due to gliosis  secondary to a previous infectious or inflammatory process.  Demyelinating disease could also have a similar appearance. Clinical  correlation suggested.    GAIL MORE MD     MRI LEFT UPPER EXTREMITY NON-JOINT WITHOUT CONTRAST, MRI RIGHT UPPER  EXTREMITY NON-JOINT WITHOUT CONTRAST April 3, 2013 1:32 PM    HISTORY: Cervicalgia. Degeneration of cervical intervertebral disc.    TECHNIQUE: Multiplanar, multisequence imaging of the brachial plexus  was performed without gadolinium contrast enhancement.    COMPARISON: None.    FINDINGS: No mass lesions are seen. No inflammatory process is  identified. The roots, trunks, branches, and divisions of both the  right and left brachial plexus appear normal. No adenopathy is seen.  The anterior scalene muscles and the middle scalene muscles appear  normal. Nodules are seen within the thyroid gland. The largest  nodule  is seen in the left lobe of the thyroid. This measures about 1.8 cm  in diameter.            Result Impression       IMPRESSION:  1. Both the right and left brachial plexus regions appear normal.  2. Thyroid nodules. The largest nodule is in the left lobe of the  thyroid. It measures about 1.8 cm in diameter.       XR WRIST BILATERAL G/E 3 VIEWS    Narrative:        EXAMINATION:  1. Each hand 3 views  2. Each wrist 3 views     DATE: 5/1/2013     HISTORY: Arthropathy with concern for rheumatoid.     FINDINGS: 3 views of each hand and 3 views of each wrist are obtained  without prior for comparison. Alignment is normal. There is no  fracture or acute bone abnormality. No distinct erosions are seen.  Some spurring of the distal radial ulnar joint is noted on the right.       Impression:     IMPRESSION:  1. No evidence of an inflammatory arthropathy involving either hand  or wrist.     VIELKA GUEVARA MD         XR FOOT BILATERAL G/E 3 VIEWS    Narrative:        EXAMINATION:  1. Each foot 3 views  2. Each ankle 3 views  3. Sacroiliac joint series     DATE: 5/1/2013     HISTORY: Arthropathy; concern for rheumatoid.     FINDINGS: No prior for comparison.     A frontal and bilateral oblique evaluation of the sacroiliac joints  shows no malalignment. Some patchy sclerosis is identified along the  central aspect of both sacroiliac joints, which is favored to be  degenerative. No distinct erosions are seen. The visualized portion  of the hip joint spaces appear maintained, with no erosive changes  identified. Mild endplate spurring is noted in the lower lumbar  spine.     3 views of each foot and 3 views of each ankle show no evidence of  acute fracture or dislocation. The metatarsal phalangeal,  tarsometatarsal and intertarsal joint spaces appear maintained. No  erosions are identified. There is no sign of acroosteolysis. The  ankle mortise and talar dome are intact bilaterally. Minimal spurring  is noted along the tip of  the medial malleolus bilaterally.       Impression:     IMPRESSION:  1. Patchy sclerosis identified along the central aspect of both  sacroiliac joints, which is favored to be degenerative. No distinct  erosions are seen.  2. No evidence of an inflammatory arthropathy in either foot or ankle.     VIELKA GUEVARA MD     5/2013  CBC WITH PLATELETS DIFFERENTIAL       Component Value Range    WBC 11.3 (*) 4.0 - 11.0 10e9/L    RBC Count 4.56  3.8 - 5.2 10e12/L    Hemoglobin 11.1 (*) 11.7 - 15.7 g/dL    Hematocrit 34.3 (*) 35.0 - 47.0 %    MCV 75 (*) 78 - 100 fl    MCH 24.3 (*) 26.5 - 33.0 pg    MCHC 32.4  31.5 - 36.5 g/dL    RDW 16.1 (*) 10.0 - 15.0 %    Platelet Count 315  150 - 450 10e9/L    Diff Method Automated Method      % Neutrophils 71.6  40 - 75 %    % Lymphocytes 20.9  20 - 48 %    % Monocytes 4.3  0 - 12 %    % Eosinophils 2.8  0 - 6 %    % Basophils 0.2  0 - 2 %    % Immature Granulocytes 0.2  0 - 0.4 %    Absolute Neutrophil 8.1  1.6 - 8.3 10e9/L    Absolute Lymphocytes 2.4  0.8 - 5.3 10e9/L    Absolute Monoctyes 0.5  0.0 - 1.3 10e9/L    Absolute Eosinophils 0.3  0.0 - 0.7 10e9/L    Absolute Basophils 0.0  0.0 - 0.2 10e9/L    Abs Immature Granulocytes 0.0  0 - 0.03 10e9/L   AMYLASE       Component Value Range    Amylase 103  30 - 110 U/L   COMPREHENSIVE METABOLIC PANEL       Component Value Range    Sodium 144  133 - 144 mmol/L    Potassium 3.8  3.4 - 5.3 mmol/L    Chloride 105  94 - 109 mmol/L    Carbon Dioxide 23  20 - 32 mmol/L    Anion Gap 17  6 - 17 mmol/L    Glucose 173 (*) 60 - 99 mg/dL    Urea Nitrogen 13  5 - 24 mg/dL    Creatinine 0.83  0.52 - 1.04 mg/dL    GFR Estimate 74  >60 mL/min/1.7m2    GFR Estimate If Black 90  >60 mL/min/1.7m2    Calcium 9.7  8.5 - 10.4 mg/dL    Bilirubin Total 0.4  0.2 - 1.3 mg/dL    Albumin 4.3  3.9 - 5.1 g/dL    Protein Total 7.8  6.8 - 8.8 g/dL    Alkaline Phosphatase 66  40 - 150 U/L    ALT 36  0 - 50 U/L    AST 28  0 - 45 U/L   CK TOTAL       Component Value Range    CK  Total 66  30 - 225 U/L   CRP INFLAMMATION       Component Value Range    CRP Inflammation 10.4 (*) 0.0 - 8.0 mg/L   LIPASE       Component Value Range    Lipase 353 (*) 20 - 250 U/L   ERYTHROCYTE SEDIMENTATION RATE AUTO       Component Value Range    Sed Rate 26 (*) 0 - 20 mm/h   ROUTINE UA WITH MICROSCOPIC REFLEX TO CULTURE       Component Value Range    Color Urine Yellow      Appearance Urine Slightly Cloudy      Glucose Urine 30 (*) NEG mg/dL    Bilirubin Urine Negative  NEG    Ketones Urine 5 (*) NEG mg/dL    Specific Gravity Urine 1.026  1.003 - 1.035    Blood Urine Trace (*) NEG    pH Urine 5.0  5.0 - 7.0 pH    Protein Albumin Urine 10 (*) NEG mg/dL    Urobilinogen mg/dL Normal  0.0 - 2.0 mg/dL    Nitrite Urine Negative  NEG    Leukocyte Esterase Urine Negative  NEG    Source Midstream Urine      WBC Urine 1  0 - 2 /HPF    RBC Urine 4 (*) 0 - 2 /HPF    Squamous Epithelial /HPF Urine <1  0 - 1 /HPF    Mucous Urine Present (*) NEG /LPF    Hyaline Casts 3 (*) 0 - 2 /LPF    Calcium Oxalate Moderate (*) NEG /HPF   COMPLEMENT C3       Component Value Range    Complement C3 143  76 - 169 mg/dL   COMPLEMENT C4       Component Value Range    Complement C4 31  15 - 50 mg/dL   HEPATITIS C ANTIBODY       Component Value Range    Hepatitis C Antibody Negative  NEG   CARDIOLIPIN ANTIBODY IGG AND IGM       Component Value Range    Cardiolipin IgG Marline    0 - 15.0 GPL    Value: <15.0      Interpretation:  Negative    Cardiolipin IgM Marline    0 - 12.5 MPL    Value: <12.5      Interpretation:  Negative   LUPUS PANEL       Component Value Range    Lupus Result    NEG    Value: Negative      (Note)      COMMENTS:      The INR is normal.      APTT is normal.  1:2 Mix is not indicated.      DRVVT Screen is normal.      Thrombin time is normal.      NEGATIVE TEST; A LUPUS ANTICOAGULANT WAS NOT DETECTED IN THIS      SPECIMEN WITHIN THE LIMITS OF THE TESTING REPERTOIRE.      If the clinical picture is strongly suggestive of an  antiphospholipid      syndrome, recommend anticardiolipin and beta-2-glycoprotein (IgG and      IgM) antibody tests.      Leela Farnks M.D.  [phone]      5/2/2013            INR =  0.93 N = 0.86-1.14  (No additional charge)      TT = 15.7 N = 13.0-19.0 sec  (No additional charge)            APTT'S:    Seconds      Reagent =  Stago LA      Normal  =  38      Patient  =  35      1:2 Mix  =  N/A      Reference =  31-43             DILUTE MADELINE VIPER VENOM TEST:      DRVVT Screen Ratio = 0.76 Normal = <1.21         IMMUNOGLOBULIN G SUBCLASSES       Component Value Range    IGG 1030  695 - 1620 mg/dL    IgG1 488  300 - 856 mg/dL    IgG2 325  158 - 761 mg/dL    IgG3 47  24 - 192 mg/dL    IgG4 18  11 - 86 mg/dL   SUNNY ANTIBODY PANEL       Component Value Range    Ribonucleic Protein IgG Antibody 0      Smith Antibody IgG 1      SSA (RO) Antibody IgG 4      SSB (LA) Antibody IgG 0      Scleroderma Antibody IgG 0     BETA 2 GLYCOPROTEIN ANTIBODIES IGG IGM       Component Value Range    Beta-2-Glycopro IgG 1      Beta-2-Glycopro IgM 5     CYCLIC CIT PEPT IGG       Component Value Range    Cyclic Cit Pept IgG/IgA    <20 UNITS    Value: <20      Interpretation:  Negative   DNA DOUBLE STRANDED ANTIBODIES       Component Value Range    DNA-ds    0 - 29 IU/mL    Value: <15      Interpretation:  Negative       Component      Latest Ref [facility] 3/11/2014   Sodium      133 - 144 mmol/L 137   Potassium      3.4 - 5.3 mmol/L 4.6   Chloride      94 - 109 mmol/L 97   Carbon Dioxide      20 - 32 mmol/L 20   Anion Gap      6 - 17 mmol/L 20 (H)   Glucose      60 - 99 mg/dL 243 (H)   Urea Nitrogen      5 - 24 mg/dL 35 (H)   Creatinine      0.52 - 1.04 mg/dL 1.47 (H)   GFR Estimate      >60 mL/min/1.7m2 38 (L)   GFR Estimate If Black      >60 mL/min/1.7m2 46 (L)   Calcium      8.5 - 10.4 mg/dL 9.7   Bilirubin Total      0.2 - 1.3 mg/dL 0.3   Albumin      3.9 - 5.1 g/dL 4.8   Protein Total      6.8 - 8.8 g/dL 7.9   Alkaline  Phosphatase      40 - 150 U/L 73   ALT      0 - 50 U/L 35   AST      0 - 45 U/L 30   Color Urine       Yellow   Appearance Urine       Clear   Glucose Urine      NEG mg/dL 500 (A)   Bilirubin Urine      NEG Negative   Ketones Urine      NEG mg/dL Negative   Specific Gravity Urine      1.003 - 1.035 1.020   Blood Urine      NEG Negative   pH Urine      5.0 - 7.0 pH 5.5   Protein Albumin Urine      NEG mg/dL Negative   Urobilinogen Urine      0.2 - 1.0 EU/dL 0.2   Nitrite Urine      NEG Negative   Leukocyte Esterase Urine      NEG Negative   Source       Midstream Urine   WBC      4.0 - 11.0 10e9/L 14.0 (H)   RBC Count      3.8 - 5.2 10e12/L 5.02   Hemoglobin      11.7 - 15.7 g/dL 12.4   Hematocrit      35.0 - 47.0 % 37.1   MCV      78 - 100 fl 74 (L)   MCH      26.5 - 33.0 pg 24.7 (L)   MCHC      31.5 - 36.5 g/dL 33.4   RDW      10.0 - 15.0 % 15.3 (H)   Platelet Count      150 - 450 10e9/L 420       Component      Latest Ref Rng 3/25/2014   Sodium      133 - 144 mmol/L 137   Potassium      3.4 - 5.3 mmol/L 3.7   Chloride      94 - 109 mmol/L 100   Carbon Dioxide      20 - 32 mmol/L 21   Anion Gap      6 - 17 mmol/L 16   Glucose      60 - 99 mg/dL 214 (H)   Urea Nitrogen      5 - 24 mg/dL 20   Creatinine      0.52 - 1.04 mg/dL 1.02   GFR Estimate      >60 mL/min/1.7m2 58 (L)   GFR Estimate If Black      >60 mL/min/1.7m2 70   Calcium      8.5 - 10.4 mg/dL 9.5   Phosphorus      2.5 - 4.5 mg/dL 3.7   Albumin      3.9 - 5.1 g/dL 4.6     Component      Latest Ref Rng 4/30/2014   CRP Inflammation      0.0 - 8.0 mg/L 10.0 (H)   Sed Rate      0 - 20 mm/h 39 (H)   Rheumatoid Factor      <20 IU/mL <20   Glucose-6-PO4 Dehydrogenase       17.7     Component      Latest Ref Rng 6/11/2014 7/15/2014   WBC      4.0 - 11.0 10e9/L 11.0    RBC Count      3.8 - 5.2 10e12/L 4.74    Hemoglobin      11.7 - 15.7 g/dL 11.8    Hematocrit      35.0 - 47.0 % 36.1    MCV      78 - 100 fl 76 (L)    MCH      26.5 - 33.0 pg 24.9 (L)    MCHC       31.5 - 36.5 g/dL 32.7    RDW      10.0 - 15.0 % 13.9    Platelet Count      150 - 450 10e9/L 434    Diff Method       Automated Method    % Neutrophils       59.2    % Lymphocytes       31.6    % Monocytes       5.9    % Eosinophils       2.6    % Basophils       0.5    % Immature Granulocytes       0.2    Absolute Neutrophil      1.6 - 8.3 10e9/L 6.5    Absolute Lymphocytes      0.8 - 5.3 10e9/L 3.5    Absolute Monoctyes      0.0 - 1.3 10e9/L 0.7    Absolute Eosinophils      0.0 - 0.7 10e9/L 0.3    Absolute Basophils      0.0 - 0.2 10e9/L 0.1    Abs Immature Granulocytes      0 - 0.4 10e9/L 0.0    Sodium      133 - 144 mmol/L 138 140   Potassium      3.4 - 5.3 mmol/L 3.9 3.8   Chloride      94 - 109 mmol/L 97 103   Carbon Dioxide      20 - 32 mmol/L 26 22   Anion Gap      6 - 17 mmol/L 14.9 15   Glucose      60 - 99 mg/dL 111 (H) 163 (H)   Urea Nitrogen      5 - 24 mg/dL 28 (H) 15   Creatinine      0.52 - 1.04 mg/dL 1.10 (H) 0.99   GFR Estimate      >60 mL/min/1.7m2 53 (L) 60 (L)   GFR Estimate If Black      >60 mL/min/1.7m2 64 72   Calcium      8.5 - 10.4 mg/dL 10.3 9.3   Bilirubin Total      0.2 - 1.3 mg/dL 0.6 0.2   Albumin      3.9 - 5.1 g/dL 5.1 4.2   Protein Total      6.8 - 8.8 g/dL 9.0 (H) 7.2   Alkaline Phosphatase      40 - 150 U/L 87 69   ALT      0 - 50 U/L 37 33   AST      0 - 45 U/L 40 26   Color Urine       Straw    Appearance Urine       Clear    Glucose Urine      NEG mg/dL Negative    Bilirubin Urine      NEG Negative    Ketones Urine      NEG mg/dL Negative    Specific Gravity Urine      1.003 - 1.035 1.005    Blood Urine      NEG Negative    pH Urine      5.0 - 7.0 pH 5.0    Protein Albumin Urine      NEG mg/dL Negative    Urobilinogen mg/dL      0.0 - 2.0 mg/dL Normal    Nitrite Urine      NEG Negative    Leukocyte Esterase Urine      NEG Negative    Source       Midstream Urine    Lipase      20 - 250 U/L 403 (H)    CRP Inflammation      0.0 - 8.0 mg/L <5.0    N-Terminal Pro Bnp      0 - 125  pg/mL  55   Hemoglobin A1C      4.3 - 6.0 %  7.0 (H)     Component      Latest Ref Rng 11/12/2014   Sodium      133 - 144 mmol/L 135   Potassium      3.4 - 5.3 mmol/L 3.8   Chloride      94 - 109 mmol/L 104   Carbon Dioxide      20 - 32 mmol/L 24   Anion Gap      3 - 14 mmol/L 7   Glucose      70 - 99 mg/dL 125 (H)   Urea Nitrogen      7 - 30 mg/dL 17   Creatinine      0.52 - 1.04 mg/dL 1.18 (H)   GFR Estimate      >60 mL/min/1.7m2 49 (L)   GFR Estimate If Black      >60 mL/min/1.7m2 59 (L)   Calcium      8.5 - 10.1 mg/dL 9.8   WBC      4.0 - 11.0 10e9/L 11.1 (H)   RBC Count      3.8 - 5.2 10e12/L 4.05   Hemoglobin      11.7 - 15.7 g/dL 9.8 (L)   Hematocrit      35.0 - 47.0 % 30.6 (L)   MCV      78 - 100 fl 76 (L)   MCH      26.5 - 33.0 pg 24.2 (L)   MCHC      31.5 - 36.5 g/dL 32.0   RDW      10.0 - 15.0 % 15.5 (H)   Platelet Count      150 - 450 10e9/L 484 (H)   CT CHEST PULMONARY EMBOLISM W CONTRAST 5/20/2015 4:57 PM  HISTORY: Pain. SOB. Elevated d-dimer.   TECHNIQUE: 65 mL Isovue 370. Axial images with coronal  reconstructions.  COMPARISON: None.  FINDINGS: Calcified granuloma with surrounding scarring in the  posterolateral left upper lobe. Triangular shaped opacity at the right  lung base in the lateral right middle lobe could represent some  scarring, atelectasis or infiltrate. There is also some scarring or  atelectasis in the posteromedial right lung base. Lungs otherwise  clear.  The pulmonary arteries are well opacified. No CT evidence for acute  pulmonary embolus. No aortic dissection.  Diffuse fatty infiltration of the liver.  IMPRESSION  IMPRESSION:   1. No pulmonary embolus identified.  2. Small focus of atelectasis, infiltrate or scarring in the lateral  right middle lobe.  3. Old granulomatous disease.  4. Otherwise negative.  SILVERIO VAZQUEZ MD  Component      Latest Ref Rng 3/27/2015   WBC      4.0 - 11.0 10e9/L 11.8 (H)   RBC Count      3.8 - 5.2 10e12/L 4.53   Hemoglobin      11.7 - 15.7 g/dL 11.0  (L)   Hematocrit      35.0 - 47.0 % 33.8 (L)   MCV      78 - 100 fl 75 (L)   MCH      26.5 - 33.0 pg 24.3 (L)   MCHC      31.5 - 36.5 g/dL 32.5   RDW      10.0 - 15.0 % 15.4 (H)   Platelet Count      150 - 450 10e9/L 419   Diff Method       Automated Method   % Neutrophils       75.3   % Lymphocytes       17.4   % Monocytes       4.4   % Eosinophils       2.5   % Basophils       0.2   % Immature Granulocytes       0.2   Absolute Neutrophil      1.6 - 8.3 10e9/L 8.9 (H)   Absolute Lymphocytes      0.8 - 5.3 10e9/L 2.1   Absolute Monoctyes      0.0 - 1.3 10e9/L 0.5   Absolute Eosinophils      0.0 - 0.7 10e9/L 0.3   Absolute Basophils      0.0 - 0.2 10e9/L 0.0   Abs Immature Granulocytes      0 - 0.4 10e9/L 0.0   Creatinine      0.52 - 1.04 mg/dL 1.12 (H)   GFR Estimate      >60 mL/min/1.7m2 52 (L)   GFR Estimate If Black      >60 mL/min/1.7m2 63   Iron      35 - 180 ug/dL 25 (L)   Iron Binding Cap      240 - 430 ug/dL 346   Iron Saturation Index      15 - 46 % 7 (L)   Albumin      3.4 - 5.0 g/dL 4.0   ALT      0 - 50 U/L 24   AST      0 - 45 U/L 15   CRP Inflammation      0.0 - 8.0 mg/L 28.0 (H)   Sed Rate      0 - 20 mm/h 81 (H)   Rheumatoid Factor      <20 IU/mL <20   Vitamin D Deficiency screening      30 - 75 ug/L 44   Ferritin      8 - 252 ng/mL 34     Component      Latest Ref Rng 7/29/2015   Sodium      133 - 144 mmol/L 137   Potassium      3.4 - 5.3 mmol/L 3.8   Chloride      94 - 109 mmol/L 106   Carbon Dioxide      20 - 32 mmol/L 23   Anion Gap      3 - 14 mmol/L 8   Glucose      70 - 99 mg/dL 148 (H)   Urea Nitrogen      7 - 30 mg/dL 17   Creatinine      0.52 - 1.04 mg/dL 1.02   GFR Estimate      >60 mL/min/1.7m2 58 (L)   GFR Estimate If Black      >60 mL/min/1.7m2 70   Calcium      8.5 - 10.1 mg/dL 9.0   Bilirubin Total      0.2 - 1.3 mg/dL 0.5   Albumin      3.4 - 5.0 g/dL 4.2   Protein Total      6.8 - 8.8 g/dL 7.4   Alkaline Phosphatase      40 - 150 U/L 64   ALT      0 - 50 U/L 23   AST      0 - 45 U/L  19     Results for FAVIO MARTINEZ (MRN [id number]) as of 10/30/2015 17:00   Ref. Range 8/21/2012 09:06 5/22/2013 14:22 4/14/2014 12:06 9/11/2014 12:35 12/4/2014 16:38   TSH Latest Range: 0.40-4.00 mU/L 0.83 0.43 0.27 (L) 0.23 (L) 0.22 (L)     Component      Latest Ref Rng 10/30/2015   WBC      4.0 - 11.0 10e9/L 13.4 (H)   RBC Count      3.8 - 5.2 10e12/L 4.76   Hemoglobin      11.7 - 15.7 g/dL 12.4   Hematocrit      35.0 - 47.0 % 37.9   MCV      78 - 100 fl 80   MCH      26.5 - 33.0 pg 26.1 (L)   MCHC      31.5 - 36.5 g/dL 32.7   RDW      10.0 - 15.0 % 14.5   Platelet Count      150 - 450 10e9/L 324   Diff Method       Automated Method   % Neutrophils       67.7   % Lymphocytes       22.7   % Monocytes       6.3   % Eosinophils       2.7   % Basophils       0.4   % Immature Granulocytes       0.2   Absolute Neutrophil      1.6 - 8.3 10e9/L 9.1 (H)   Absolute Lymphocytes      0.8 - 5.3 10e9/L 3.1   Absolute Monoctyes      0.0 - 1.3 10e9/L 0.9   Absolute Eosinophils      0.0 - 0.7 10e9/L 0.4   Absolute Basophils      0.0 - 0.2 10e9/L 0.1   Abs Immature Granulocytes      0 - 0.4 10e9/L 0.0   Creatinine      0.52 - 1.04 mg/dL 1.21 (H)   GFR Estimate      >60 mL/min/1.7m2 47 (L)   GFR Estimate If Black      >60 mL/min/1.7m2 57 (L)   Iron      35 - 180 ug/dL 70   Iron Binding Cap      240 - 430 ug/dL 428   Iron Saturation Index      15 - 46 % 16   Albumin      3.4 - 5.0 g/dL 4.6   ALT      0 - 50 U/L 29   AST      0 - 45 U/L 21   CRP Inflammation      0.0 - 8.0 mg/L <2.9   Sed Rate      0 - 20 mm/h 8   Vitamin D Deficiency screening      20 - 75 ug/L 46   Ferritin      8 - 252 ng/mL 18   TSH      0.40 - 4.00 mU/L 0.49   T4 Free      0.76 - 1.46 ng/dL 1.06   Free T3      2.3 - 4.2 pg/mL 2.8     Component      Latest Ref Rng 11/17/2015   Testosterone Total      8 - 60 ng/dL 19   Sex Hormone Binding Globulin      30 - 135 nmol/L 32   Free Testosterone Calculated      0.11 - 0.58 ng/dL 0.34   Vitamin A       0.61   Retinol  Palmitate       <0.02 . . .   Vitamin A Interp       Normal . . .   Thyroglobulin Antibody      <40 IU/mL <20   Thyroid Peroxidase Antibody      <35 IU/mL 31   DHEA Sulfate      35 - 430 ug/dL 101   Zinc       68     Component      Latest Ref Rng 1/27/2016   Sodium      133 - 144 mmol/L 135   Potassium      3.4 - 5.3 mmol/L 4.0   Chloride      94 - 109 mmol/L 104   Carbon Dioxide      20 - 32 mmol/L 24   Anion Gap      3 - 14 mmol/L 7   Glucose      70 - 99 mg/dL 88   Urea Nitrogen      7 - 30 mg/dL 20   Creatinine      0.52 - 1.04 mg/dL 1.13 (H)   GFR Estimate      >60 mL/min/1.7m2 51 (L)   GFR Estimate If Black      >60 mL/min/1.7m2 62   Calcium      8.5 - 10.1 mg/dL 9.4   Bilirubin Total      0.2 - 1.3 mg/dL 0.7   Albumin      3.4 - 5.0 g/dL 4.3   Protein Total      6.8 - 8.8 g/dL 7.7   Alkaline Phosphatase      40 - 150 U/L 66   ALT      0 - 50 U/L 24   AST      0 - 45 U/L 18   Cholesterol      <200 mg/dL 112   Triglycerides      <150 mg/dL 100   HDL Cholesterol      >49 mg/dL 34 (L)   LDL Cholesterol Calculated      <100 mg/dL 58   Non HDL Cholesterol      <130 mg/dL 78   N-Terminal Pro Bnp      0 - 125 pg/mL 29   Hemoglobin A1C      4.3 - 6.0 % 7.0 (H)   Amylase      30 - 110 U/L 60   Lipase      73 - 393 U/L 394 (H)   Troponin I ES      0.000 - 0.045 ug/L <0.015 . . .     Component      Latest Ref Rng 2/24/2016   Angiotensin Converting Enzyme       <5 (L) . . .   Neutrophil Cytoplasmic IgG Antibody       <1:20 . . .   Sed Rate      0 - 20 mm/h 86 (H)     Copath Report      Patient Name: FAVIO MARTINEZ   MR#: [id number]   Specimen #: [id number]   Collected: 3/15/2016   Received: 3/15/2016   Reported: 3/17/2016 13:33   Ordering Phy(s): PARVEEN ENRIQUEZ     ORIGINAL REPORT FOLLOWS ADDENDUM  ADDENDUM     TO ORIGINAL REPORT   Status: Signed Out   Date Ordered:3/18/2016   Date Complete:3/18/2016   Date Reported:3/18/2016 12:06   Signed Out By: Marianne Godfrey MD     INTERPRETATION:   This addendum is done to  "incorporate the results of fungal (GMS) stains   done on the specimen.  Specimen is negative for fungal organisms.  The   original final diagnosis remains unchanged.     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  Negative for malignancy(Please   see microscopic description)     Electronically signed out by:     Marianne Godfrey MD     CLINICAL HISTORY:   right orbital mass     GROSS:   The specimen is received labeled \"right orbital biopsy\" and consists of   red-tan nodule measuring 1.5 x 0.9 x 0.6 cm with one smooth side and   opposite rough side consistent with periosteum.  The specimen is   bisected revealing homogenous pale tan fleshy cut surface.  Touch   preparations are prepared, air dried and fixed, portion of specimen is   submitted in RPMI for possible lymphoma workup [facility],   ([facility], [place], [place] ).  The remainder is entirely submitted.   (Dictated by: Marianne Godfrey MD 3/15/2016 04:45 PM)     MICROSCOPIC:     Specimen consists of portion of lacrimal gland with acute and chronic   inflammation.  Focal area of microabscess formation associated with   small areas of necrosis are also present.  Inflammation is seen   extending to the periorbital adipose tissue forming panniculitis.   Specimen is negative for malignancy.  Samples sent for immunophenotyping   to [facility], ([facility], [place], [place] ) reveals no evidence   of monoclonality or aberrant antigen expression.  A GMS (fungal) stain   is pending and results will be reported as an addendum.     CPT Codes:   A: 77649-GW5, 80858-QRPJD, SOH, 30564-SAOKS, 88166-HJNA     TESTING LAB LOCATION:   [address]  [address] [phone]     COLLECTION SITE: "   Client: [facility]   Location: [facility] (S)            Component      Latest Ref Rng 4/29/2016   Nucleated RBCs      0 /100 0   Absolute Neutrophil      1.6 - 8.3 10e9/L 8.9 (H)   Absolute Lymphocytes      0.8 - 5.3 10e9/L 3.2   Absolute Monocytes      0.0 - 1.3 10e9/L 0.8   Absolute Eosinophils      0.0 - 0.7 10e9/L 0.2   Absolute Basophils      0.0 - 0.2 10e9/L 0.1   Abs Immature Granulocytes      0 - 0.4 10e9/L 0.1   Absolute Nucleated RBC       0.0   IGG      695 - 1620 mg/dL 836   IgG1      300 - 856 mg/dL 280 (L)   IgG2      158 - 761 mg/dL 277   IgG3      24 - 192 mg/dL 35   IgG4      11 - 86 mg/dL 16   RNP Antibody IgG      0.0 - 0.9 AI <0.2 . . .   Smith SUNNY Antibody IgG      0.0 - 0.9 AI <0.2 . . .   SSA (Ro) (SUNNY) Antibody, IgG      0.0 - 0.9 AI <0.2 . . .   SSB (La) (SUNNY) Antibody, IgG      0.0 - 0.9 AI <0.2 . . .   Scleroderma Antibody Scl-70 SUNNY IgG      0.0 - 0.9 AI <0.2 . . .   Cholesterol      <200 mg/dL 154   Triglycerides      <150 mg/dL 220 (H)   HDL Cholesterol      >49 mg/dL 42 (L)   LDL Cholesterol Calculated      <100 mg/dL 67   Non HDL Cholesterol      <130 mg/dL 111   M Tuberculosis Result      NEG Negative   M Tuberculosis Antigen Value       0.00   Albumin      3.4 - 5.0 g/dL 4.3   ALT      0 - 50 U/L 30   AST      0 - 45 U/L 10   Complement C3      76 - 169 mg/dL 157   Complement C4      15 - 50 mg/dL 32   CRP Inflammation      0.0 - 8.0 mg/L <2.9   Sed Rate      0 - 20 mm/h 2   DNA-ds      <10 IU/mL 1   Cyclic Citrullinated Peptide Antibody, IgG      <7 U/mL 1   Rheumatoid Factor      <20 IU/mL <20   Proteinase 3 Antibody IgG      0.0 - 0.9 AI <0.2 . . .   Myeloperoxidase Antibody IgG      0.0 - 0.9 AI 2.5 (H)   Vitamin D Deficiency screening      20 - 75 ug/L 24   Hemoglobin A1C      4.3 - 6.0 % 7.9 (H)   ALLI by IFA IgG       1:40 (A) . . .     Component      Latest Ref Rng & Units 4/7/2017   WBC      4.0 - 11.0 10e9/L 11.3 (H)   RBC Count      3.8 - 5.2 10e12/L 4.77    Hemoglobin      11.7 - 15.7 g/dL 12.5   Hematocrit      35.0 - 47.0 % 38.7   MCV      78 - 100 fl 81   MCH      26.5 - 33.0 pg 26.2 (L)   MCHC      31.5 - 36.5 g/dL 32.3   RDW      10.0 - 15.0 % 13.2   Platelet Count      150 - 450 10e9/L 347   Diff Method       Automated Method   % Neutrophils      % 67.1   % Lymphocytes      % 22.9   % Monocytes      % 6.2   % Eosinophils      % 3.0   % Basophils      % 0.4   % Immature Granulocytes      % 0.4   Nucleated RBCs      0 /100 0   Absolute Neutrophil      1.6 - 8.3 10e9/L 7.5   Absolute Lymphocytes      0.8 - 5.3 10e9/L 2.6   Absolute Monocytes      0.0 - 1.3 10e9/L 0.7   Absolute Eosinophils      0.0 - 0.7 10e9/L 0.3   Absolute Basophils      0.0 - 0.2 10e9/L 0.1   Abs Immature Granulocytes      0 - 0.4 10e9/L 0.1   Absolute Nucleated RBC       0.0   IGG      695 - 1620 mg/dL 962   IgG1      300 - 856 mg/dL Test sent to [facility]. See ARMISC.   IgG2      158 - 761 mg/dL Test sent to [facility]. See ARMISC.   IgG3      24 - 192 mg/dL Test sent to [facility]. See ARMISC.   IgG4      11 - 86 mg/dL Test sent to [facility]. See ARMISC.   Result       SEE NOTE . . .   Test Name       IGG SUBCLASSES   Send Outs Misc Test Code       12315   Send Outs Misc Test Specimen       Serum   Creatinine      0.52 - 1.04 mg/dL 1.20 (H)   GFR Estimate      >60 mL/min/1.7m2 47 (L)   GFR Estimate If Black      >60 mL/min/1.7m2 57 (L)   Creatinine Urine      mg/dL    Albumin Urine mg/L      mg/L    Albumin Urine mg/g Cr      0 - 25 mg/g Cr    Myeloperoxidase Antibody IgG      0.0 - 0.9 AI 2.9 (H)   Proteinase 3 Antibody IgG      0.0 - 0.9 AI <0.2 . . .   Neutrophil Cytoplasmic IgG Antibody       1:80 (A) . . .   Angiotensin Converting Enzyme       <5 (L) . . .   Lab Scanned Result       TPMT GENOTYPE-Scanned   Vitamin C       56   ALT      0 - 50 U/L 23   Albumin      3.4 - 5.0 g/dL 4.3   AST      0 - 45 U/L 18   CRP Inflammation      0.0 - 8.0 mg/L 3.7   Sed Rate      0 - 30 mm/h 17   Vitamin D Deficiency  screening      20 - 75 ug/L 40   Hemoglobin A1C      4.3 - 6.0 %      Component      Latest Ref Rng & Units 4/26/2017   WBC      4.0 - 11.0 10e9/L 11.8 (H)   RBC Count      3.8 - 5.2 10e12/L 4.23   Hemoglobin      11.7 - 15.7 g/dL 11.2 (L)   Hematocrit      35.0 - 47.0 % 35.0   MCV      78 - 100 fl 83   MCH      26.5 - 33.0 pg 26.5   MCHC      31.5 - 36.5 g/dL 32.0   RDW      10.0 - 15.0 % 13.4   Platelet Count      150 - 450 10e9/L 304   Diff Method       Automated Method   % Neutrophils      % 66.2   % Lymphocytes      % 22.7   % Monocytes      % 6.5   % Eosinophils      % 3.9   % Basophils      % 0.4   % Immature Granulocytes      % 0.3   Nucleated RBCs      0 /100 0   Absolute Neutrophil      1.6 - 8.3 10e9/L 7.8   Absolute Lymphocytes      0.8 - 5.3 10e9/L 2.7   Absolute Monocytes      0.0 - 1.3 10e9/L 0.8   Absolute Eosinophils      0.0 - 0.7 10e9/L 0.5   Absolute Basophils      0.0 - 0.2 10e9/L 0.1   Abs Immature Granulocytes      0 - 0.4 10e9/L 0.0   Absolute Nucleated RBC       0.0   IGG      695 - 1620 mg/dL    IgG1      300 - 856 mg/dL    IgG2      158 - 761 mg/dL    IgG3      24 - 192 mg/dL    IgG4      11 - 86 mg/dL    Result          Test Name          Send Outs Misc Test Code          Send Outs Misc Test Specimen          Creatinine      0.52 - 1.04 mg/dL 1.24 (H)   GFR Estimate      >60 mL/min/1.7m2 46 (L)   GFR Estimate If Black      >60 mL/min/1.7m2 55 (L)   Creatinine Urine      mg/dL 121   Albumin Urine mg/L      mg/L <5   Albumin Urine mg/g Cr      0 - 25 mg/g Cr Unable to calculate due to low value   Myeloperoxidase Antibody IgG      0.0 - 0.9 AI    Proteinase 3 Antibody IgG      0.0 - 0.9 AI    Neutrophil Cytoplasmic IgG Antibody          Angiotensin Converting Enzyme          Lab Scanned Result          Vitamin C          ALT      0 - 50 U/L 25   Albumin      3.4 - 5.0 g/dL 4.1   AST      0 - 45 U/L 15   CRP Inflammation      0.0 - 8.0 mg/L    Sed Rate      0 - 30 mm/h    Vitamin D Deficiency  screening      20 - 75 ug/L    Hemoglobin A1C      4.3 - 6.0 % 7.8 (H)   EXAMINATION: CT CHEST ABDOMEN PELVIS W/O CONTRAST, 4/7/2017 2:59 PM     TECHNIQUE: Helical CT images from the thoracic inlet through the  symphysis pubis were obtained without IV contrast.     COMPARISON: CT chest on 5/20/2015. CT abdomen pelvis on 6/11/2014     HISTORY: Granuloma of right orbit. Concern for malignancy, lymphoma.     Additional history from the EMR: 50-year-old female with rheumatoid  arthritis and fibromyalgia. Presented on April 2016 with new right  orbital inflammatory mass, biopsied showed panniculitis without  infection or malignancy. Some intermittent swelling around her right  orbit.     FINDINGS:     Chest: Cardiac size is not enlarged. No pericardial effusion.  Calcified subcentimeter mediastinal and left hilar lymph nodes. No  thoracic adenopathy. Coronary artery calcifications/stents.  Benign-appearing coarse calcifications in the thyroid, better  described on ultrasound thyroid from 9/13/2016.     Central airways are patent. No pneumothorax or pleural effusion.  Calcified pulmonary granuloma in the posterior left upper lobe,  benign. Small focus of subpleural fibrosis and cysts along the  anterior lateral right lower lobe (image 96 series 6).     Abdomen and pelvis: Cholecystectomy. The liver, adrenal glands,  kidneys, spleen, pancreas, stomach, duodenum are without focal  abnormality on these noncontrast images.     No abdominal aortic aneurysm. No suspicious adenopathy in the abdomen  or pelvis. Bladder is intact. Calcified fibroid off the uterine  fundus. IUD in the uterus.     No focal bowel wall thickening or obstruction. No free air or free  fluid. Appendix is normal. Small periumbilical hernia.     Bones and soft tissues: No suspicious osseous lesions. Unremarkable  superficial soft tissues.         IMPRESSION: No evidence of malignancy in the chest, abdomen, or  pelvis.        I have personally reviewed the  examination and initial interpretation  and I agree with the findings.     CONNIE BRICE      Component      Latest Ref Rng & Units 6/1/2017   WBC      4.0 - 11.0 10e9/L 12.0 (H)   RBC Count      3.8 - 5.2 10e12/L 5.18   Hemoglobin      11.7 - 15.7 g/dL 13.8   Hematocrit      35.0 - 47.0 % 41.0   MCV      78 - 100 fl 79   MCH      26.5 - 33.0 pg 26.6   MCHC      31.5 - 36.5 g/dL 33.7   RDW      10.0 - 15.0 % 14.8   Platelet Count      150 - 450 10e9/L 322   Diff Method       Automated Method   % Neutrophils      % 76.7   % Lymphocytes      % 16.5   % Monocytes      % 4.6   % Eosinophils      % 1.9   % Basophils      % 0.3   Absolute Neutrophil      1.6 - 8.3 10e9/L 9.2 (H)   Absolute Lymphocytes      0.8 - 5.3 10e9/L 2.0   Absolute Monocytes      0.0 - 1.3 10e9/L 0.6   Absolute Eosinophils      0.0 - 0.7 10e9/L 0.2   Absolute Basophils      0.0 - 0.2 10e9/L 0.0   Color Urine       Yellow   Appearance Urine       Clear   Glucose Urine      NEG mg/dL Negative   Bilirubin Urine      NEG Negative   Ketones Urine      NEG mg/dL Negative   Specific Gravity Urine      1.003 - 1.035 1.010   pH Urine      5.0 - 7.0 pH 5.5   Protein Albumin Urine      NEG mg/dL Negative   Urobilinogen Urine      0.2 - 1.0 EU/dL 0.2   Nitrite Urine      NEG Negative   Blood Urine      NEG Negative   Leukocyte Esterase Urine      NEG Negative   Source       Midstream Urine   WBC Urine      0 - 2 /HPF O - 2   RBC Urine      0 - 2 /HPF O - 2   Squamous Epithelial /LPF Urine      FEW /LPF Few   Bacteria Urine      NEG /HPF Few (A)   Creatinine      0.52 - 1.04 mg/dL 1.19 (H)   GFR Estimate      >60 mL/min/1.7m2 48 (L)   GFR Estimate If Black      >60 mL/min/1.7m2 58 (L)   Protein Random Urine      g/L <0.05   Protein Total Urine g/gr Creatinine      0 - 0.2 g/g Cr Unable to calculate due to low value   Myeloperoxidase Antibody IgG      0.0 - 0.9 AI 1.9 (H)   Proteinase 3 Antibody IgG      0.0 - 0.9 AI <0.2 . . .   ALT      0 - 50 U/L 29   AST       0 - 45 U/L 18   Albumin      3.4 - 5.0 g/dL 4.6   CRP Inflammation      0.0 - 8.0 mg/L 6.1   Sed Rate      0 - 30 mm/h 13   Creatinine Urine Random      mg/dL 54   Neutrophil Cytoplasmic IgG Antibody       <1:20 . . .   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      Comparison: MRI brain 5/29/2013      Technique:   Orbits: Axial and coronal T1-weighted, and coronal T2-weighted images  obtained without intravenous contrast. Post-intravenous contrast  (using gadolinium) sagittal FLAIR, were obtained with fat-saturation,  focused on the orbits.  Brain: Axial susceptibility-weighted and FLAIR sequences were obtained  of the whole brain without intravenous contrast, and postcontrast  axial T1-weighted images were obtained through the whole brain.   Contrast: 7.5mL Gadavist     Findings:  New asymmetric enlargement of the right lacrimal gland and enhancement  of the right lacrimal gland with surrounding ill-defined crescentic T2  hyperintense signal and enhancement within the right superior  superotemporal orbit, predominantly involving the extraconal space  with slight intraconal extension. No definite associated restricted  diffusion. No discrete extraocular muscle enlargement. Normal  symmetric optic nerve signal. Cavernous sinuses and orbital apices are  normal. Globes are normal.     Whole brain images are symmetric minimal leukoaraiosis and generalized  parenchymal volume loss. Ventricles are not enlarged. No intracranial  hemorrhage, mass effect, midline shift or abnormal extra axial fluid  collection. No abnormal foci of intracranial enhancement or restricted  diffusion. Paranasal sinuses and mastoid air cells are clear.            Impression:    New abnormal  enlargement of the right lacrimal gland with surrounding  ill-defined T2 hyperintense signal and enhancement centered in the  superotemporal right orbital fat, which may represent   infectious/inflammatory dacryadenitis, orbital pseudotumor, lymphoma,  carcinoma, sarcoidosis or IgG4 disease..     I have personally reviewed the examination and initial interpretation  and I agree with the findings.     PATRICIA BRANTLEY MD      Component      Latest Ref Rng & Units 6/1/2017   WBC      4.0 - 11.0 10e9/L 12.0 (H)   RBC Count      3.8 - 5.2 10e12/L 5.18   Hemoglobin      11.7 - 15.7 g/dL 13.8   Hematocrit      35.0 - 47.0 % 41.0   MCV      78 - 100 fl 79   MCH      26.5 - 33.0 pg 26.6   MCHC      31.5 - 36.5 g/dL 33.7   RDW      10.0 - 15.0 % 14.8   Platelet Count      150 - 450 10e9/L 322   Diff Method       Automated Method   % Neutrophils      % 76.7   % Lymphocytes      % 16.5   % Monocytes      % 4.6   % Eosinophils      % 1.9   % Basophils      % 0.3   Absolute Neutrophil      1.6 - 8.3 10e9/L 9.2 (H)   Absolute Lymphocytes      0.8 - 5.3 10e9/L 2.0   Absolute Monocytes      0.0 - 1.3 10e9/L 0.6   Absolute Eosinophils      0.0 - 0.7 10e9/L 0.2   Absolute Basophils      0.0 - 0.2 10e9/L 0.0   Color Urine       Yellow   Appearance Urine       Clear   Glucose Urine      NEG mg/dL Negative   Bilirubin Urine      NEG Negative   Ketones Urine      NEG mg/dL Negative   Specific Gravity Urine      1.003 - 1.035 1.010   pH Urine      5.0 - 7.0 pH 5.5   Protein Albumin Urine      NEG mg/dL Negative   Urobilinogen Urine      0.2 - 1.0 EU/dL 0.2   Nitrite Urine      NEG Negative   Blood Urine      NEG Negative   Leukocyte Esterase Urine      NEG Negative   Source       Midstream Urine   WBC Urine      0 - 2 /HPF O - 2   RBC Urine      0 - 2 /HPF O - 2   Squamous Epithelial /LPF Urine      FEW /LPF Few   Bacteria Urine      NEG /HPF Few (A)   Creatinine      0.52 - 1.04 mg/dL 1.19 (H)   GFR Estimate      >60 mL/min/1.7m2 48 (L)  "  GFR Estimate If Black      >60 mL/min/1.7m2 58 (L)   Protein Random Urine      g/L <0.05   Protein Total Urine g/gr Creatinine      0 - 0.2 g/g Cr Unable to calculate due to low value   Myeloperoxidase Antibody IgG      0.0 - 0.9 AI 1.9 (H)   Proteinase 3 Antibody IgG      0.0 - 0.9 AI <0.2 . . .   ALT      0 - 50 U/L 29   AST      0 - 45 U/L 18   Albumin      3.4 - 5.0 g/dL 4.6   CRP Inflammation      0.0 - 8.0 mg/L 6.1   Sed Rate      0 - 30 mm/h 13   Creatinine Urine Random      mg/dL 54   Neutrophil Cytoplasmic IgG Antibody       <1:20 . . .   Creatinine Urine      mg/dL 54       Patient Name: FAVIO MARTINEZ   MR#: [id number]   Specimen #: [id number]   Collected: 8/4/2017   Received: 8/4/2017   Reported: 8/9/2017 16:19   Ordering Phy(s): CRISTHIAN FLORES     For improved result formatting, select 'View Enhanced Report Format'   under Linked Documents section.     SPECIMEN(S):   Eye, right lacrimal gland     FINAL DIAGNOSIS:   Eye, right, \"lacrimal gland\", biopsy   - Dense fibrosis and patchy inflammation   - No residual lacrimal gland seen     COMMENT:   Sections show tissue involved by dense fibrosis and patchy inflammation.   There is no morphologic or immunohistochemical evidence of lymphoma. By   separate report, concurrent flow cytometry studies ([id number]),   performed at the [facility], show polytypic B cells and no   aberrant immunophenotype on T cells. Immunohistochemical stains for IgG   and IgG-4 were performed, which provide no support for IgG-4-related   disease. Some of these changes may be related to prior surgery. Sjögren   disease is not excluded. There is no evidence of malignancy. Dr. Max Conway has reviewed this case and concurs.     Electronically signed out by:     Antonella Beth M.D.       ROS:  A comprehensive ROS was done, positives are per HPI.        HISTORY REVIEW:  Past Medical History:   Diagnosis Date     Abnormal glandular Papanicolaou smear of cervix 1992     " Allergic rhinitis     Allergy, airborne subst     Arthritis      ASCVD (arteriosclerotic cardiovascular disease)      Chronic pain      Chronic pancreatitis (H)     idiopathic, Tx: PPI, antioxidants, pancreatic enzymes     Common migraine      Coronary artery disease      Costochondritis      Costochondritis      Difficulty in walking(719.7)      Dyspnea on exertion      Ectasia, mammary duct     [facility], persistent nipple discharge     Elevated fasting glucose      Gastro-oesophageal reflux disease      Hernia      History of angina      Hyperlipidemia LDL goal < 100      Hypertension goal BP (blood pressure) < 140/90     Essential hypertension     Iron deficiency anemia      Ischemic cardiomyopathy      Menorrhagia      Migraine headaches      Mild persistent asthma      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 Matias, neg w/u, Rx'd w/prednisone     Ventral hernia, unspecified, without mention of obstruction or gangrene 2012     Past Surgical History:   Procedure Laterality Date     C ECHO HEART XTHORACIC,COMPLETE, W/O DOPPLER  04    [facility] WNL, EF 60%     C/SECTION, LOW TRANSVERSE           CARDIAC SURGERY      cardiac stent- recent in 16; 4 other stents     DILATION AND CURETTAGE N/A 2016    Procedure: DILATION  AND CURETTAGE;  Surgeon: Nahed Butler MD;  Location: [facility]     HC UGI ENDOSCOPY W EUS  08    Dr. Pastrana, possible chronic pancreatitis, fatty liver     [place] [facility] [place] INTRAUTERINE DEVICE N/A 2016    Procedure: INSERT INTRAUTERINE DEVICE;  Surgeon: Nahed Butler MD;  Location: [facility]     INT UTERINE FIBRIOD EMBOLIZATION  10/29/2014     LAPAROSCOPIC CHOLECYSTECTOMY  08    Dr. Arnol GRUBBS TX, CERVICAL      s/p LEEP     ORBITOTOMY Right 3/15/2016    Procedure: ORBITOTOMY;  Surgeon: Myron Cyr MD;  [facility]     ORBITOTOMY Right 2017    Procedure: ORBITOTOMY;  RIGHT ORBITOTOMY AND BIOPSY;  Surgeon: Charis Holbrook MD;  [facility]     REPAIR PTOSIS Right 2017    Procedure: REPAIR PTOSIS;  RIGHT UPPER LID PTOSIS REPAIR;  Surgeon: Myron Cyr MD;  Location: [facility]     UPPER GI ENDOSCOPY  10/21/08    mild gastritis, Dr. Hidalgo     Family History   Problem Relation Age of Onset     HEART DISEASE Father 50     heart attack     CEREBROVASCULAR DISEASE Father      DIABETES Father      Hypertension Father      Depression Father      C.A.D. Father      Hypertension Mother      Arthritis Mother      HEART DISEASE Mother      long qt syndrome     Thyroid Disease Mother      C.A.D. Mother      HEART DISEASE Brother 15     MI at 15, 16.      DIABETES Maternal Uncle      Hypertension Maternal Aunt      Hypertension Maternal Uncle      Arthritis Brother      he passed away, had severe arthritis at age 11     Arthritis Maternal Uncle      Eye Disorder Maternal Uncle      cataracts     Neurologic Disorder Sister      migraines     Neurologic Disorder Sister      migraines     Respiratory Son      asthma     CEREBROVASCULAR DISEASE Maternal Uncle      C.A.D. Brother      Family History Negative Other      neg for RA, SLE     Unknown/Adopted No family hx of      unknown neurological issues in her family, mother was adopted      Skin Cancer No family hx of      No known family hx of skin cancer     Social History     Social History     Marital status: Single     Spouse name: N/A     Number of children: 1     Years of education: N/A     Occupational History      None      Social History Main Topics     Smoking status: Former Smoker     Packs/day: 0.20     Years: 1.00     Types: Cigarettes     Quit date: 2/1/2016     Smokeless tobacco: Never Used     Alcohol use No     Drug use: No     Sexual activity: Not Currently     Other Topics Concern     Parent/Sibling W/ Cabg, Mi Or Angioplasty Before 65f 55m? Yes     Social History Narrative    Balanced Diet - sometimes    Osteoporosis Prevention Measures - Dairy servings per day: 2 servings weekly    Regular Exercise -  Yes Describe walking 4 times a week    Dental Exam - NO    Seatbelts used - Yes    Self Breast Exam - Yes    Abuse: Current or Past (Physical, Sexual or Emotional)- No    Do you have any concerns about STD's -  No    Do you feel safe in your environment - No    Guns stored in the home - No    Sunscreen used - Yes    Lipids -  YES - Date: 053102    Glucose -  YES - Date: 012804    Eye Exam - YES - Date: one year ago    Colon Cancer Screening - No    Hemoccults - NO    Pap Test -  YES - Date: 070904, remote history of LEEP    Mammography - YES - Date: last spring, would like to discuss, needs [facility]    DEXA - NO    Immunizations reviewed and up to date - Yes, last td given in 1997 or 1998     Patient Active Problem List   Diagnosis     Seasonal affective disorder (H)     Allergic rhinitis     PCOS (polycystic ovarian syndrome)     Moderate persistent asthma     Chronic pancreatitis (H)     Hypertension goal BP (blood pressure) < 140/90     Common migraine     NSTEMI (non-ST elevated myocardial infarction) (H)     Hyperlipidemia LDL goal <70     ASCVD (arteriosclerotic cardiovascular disease)     GERD (gastroesophageal reflux disease)     Ischemic  cardiomyopathy     Hypertensive heart disease     Uterine leiomyoma     Iron deficiency anemia     Costochondritis     Vitamin D deficiency     Breast cancer screening     Spinal stenosis in cervical region     Fibromyalgia     Seronegative rheumatoid arthritis (H)     Type 2 diabetes, HbA1C goal < 8% (H)     Type 2 diabetes mellitus with other specified complication (H)     Hyperlipidemia associated with type 2 diabetes mellitus (H)     Hypertension associated with diabetes (H)     Overweight with body mass index (BMI) 25.0-29.9     Tobacco use disorder     Telogen effluvium     CAD S/P percutaneous coronary angioplasty     Status post coronary angiogram       Pregnancy Hx: She is . All misscarriages were in first trimester. H/o OC use in the past. 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    PMHx, FHx, SHx were reviewed, unchanged.      Outpatient Encounter Prescriptions as of 2018   Medication Sig Dispense Refill     traMADol (ULTRAM) 50 MG tablet Take 1 tablet (50 mg) by mouth every 8 hours as needed for moderate pain 60 tablet 3     insulin glargine (LANTUS) 100 UNIT/ML injection Inject 40 Units Subcutaneous daily (with breakfast)       spironolactone (ALDACTONE) 50 MG tablet Take 1 tablet (50 mg) by mouth daily . Take additional 0.5 tablets by mouth once daily as needed for weight gain. 45 tablet 11     lisinopril-hydrochlorothiazide (PRINZIDE/ZESTORETIC) 20-25 MG per tablet Take 1 tablet by mouth daily 30 tablet 11     clopidogrel (PLAVIX) 75 MG tablet Take 1 tablet (75 mg) by mouth daily 30 tablet 11     nitroGLYcerin (NITROSTAT) 0.4 MG sublingual tablet Place 1 tablet (0.4 mg) under the tongue every 5 minutes as needed for chest pain 25 tablet 1     pravastatin (PRAVACHOL) 40 MG tablet Take 1 tablet (40 mg) by mouth daily 30 tablet 11     ketoconazole (NIZORAL) 2 % cream   3     metroNIDAZOLE (METROCREAM) 0.75 % cream CECI EXT TO FACE BID  2     folic acid (FOLVITE) 1 MG tablet TK 1  T PO D  2     azelastine (ASTELIN) 0.1 % spray U 2 SPRAYS IEN BID  0     SYMBICORT 80-4.5 MCG/ACT Inhaler INHALE 2 PUFFS PO BID RINSE AND EXPECTORATE AFTER  3     cyclobenzaprine (FLEXERIL) 10 MG tablet Take 1 tablet (10 mg) by mouth 2 times daily as needed for muscle spasms 180 tablet 0     hydroxychloroquine (PLAQUENIL) 200 MG tablet Take 2 tablets (400 mg) by mouth daily 180 tablet 1     BASAGLAR 100 UNIT/ML injection Inject 40 Units Subcutaneous daily (Patient not taking: Reported on 12/20/2017) 12 mL 2     bacitracin ophthalmic ointment Apply to incision line three times daily. (Patient not taking: Reported on 12/20/2017) 3.5 g 0     COMPRESSION STOCKINGS 2 each daily (Patient not taking: Reported on 12/20/2017) 4 each 2     COMPRESSION STOCKINGS 2 each daily Apply one 10-15 mmHg compression stocking to each lower extgmierty during the day and remove before bedtime. (Patient not taking: Reported on 12/20/2017) 4 each 2     insulin pen needle (BD MARCK U/F) 32G X 4 MM USE DAILY OR AS DIRECTED 300 each 3     insulin glargine (BASAGLAR KWIKPEN) 100 UNIT/ML injection Inject 40 Units Subcutaneous daily (Patient not taking: Reported on 12/20/2017) 18 mL 3     ranitidine (ZANTAC) 150 MG tablet Take 1 tablet (150 mg) by mouth 2 times daily 60 tablet 2     lidocaine (XYLOCAINE) 5 % ointment Apply 1 g topically 2 times daily as needed for moderate pain (Patient not taking: Reported on 12/20/2017) 50 g 5     ferrous gluconate (FERGON) 324 (38 FE) MG tablet Take 1 tablet (324 mg) by mouth 2 times daily (Patient taking differently: Take 1 tablet by mouth daily ) 180 tablet 1     blood glucose monitoring (ONE TOUCH DELICA) lancets Use to test blood sugars 4 times daily or as directed. (Patient not taking: Reported on 12/20/2017) 4 Box 3     vitamin D (ERGOCALCIFEROL) 18825 UNIT capsule Take 1 capsule (50,000 Units) by mouth every 7 days Need a Vitamin D level in 2 months. (Patient taking differently: Take 50,000 Units by  mouth every 7 days Need a Vitamin D level in 2 months.) 8 capsule 0     blood glucose monitoring (NO BRAND SPECIFIED) meter device kit Use to test blood sugar 4 X times daily or as directed. (Patient not taking: Reported on 12/20/2017) 1 kit 0     blood glucose monitoring (NO BRAND SPECIFIED) test strip 1 strip by In Vitro route 4 times daily Test as directed. Please dispense three months and three months of refills. Thank you. (Patient not taking: Reported on 12/20/2017) 360 each 3     diphenhydrAMINE (BENADRYL) 25 MG capsule TK 1 TO 2 CS PO QHS  4     EPIPEN 2-RIKY 0.3 MG/0.3ML injection INJECT 0.3 MG INTO THE MUSCLE PRF ANAPHYALAXIS  0     AEROSPAN 80 MCG/ACT AERS INHALE 2 PUFFS PO BID.  RINSE MOUTH AFTERWARDS  4     Magnesium Oxide -Mg Supplement 250 MG TABS TK 1 T PO BID. REDUCE IF STOOLS LOOSEN  11     nystatin (MYCOSTATIN) 368026 UNITS TABS tablet TK 2 TS PO BID  0     albuterol (2.5 MG/3ML) 0.083% neb solution INL 1 VIAL VIA NEBULIZATION Q 4 TO 6 HOURS PRN  1     dicyclomine (BENTYL) 20 MG tablet Take 1 tablet (20 mg) by mouth 4 times daily as needed 60 tablet 5     sucralfate (CARAFATE) 1 GM tablet Take 1 tablet (1 g) by mouth 4 times daily as needed 120 tablet 3     fluocinolone (SYNALAR) 0.01 % solution Apply topically daily as needed       mometasone (NASONEX) 50 MCG/ACT spray Spray 2 sprays into both nostrils daily       Ondansetron HCl (ZOFRAN PO)        metFORMIN (GLUCOPHAGE-XR) 500 MG 24 hr tablet Take 2 tablets (1,000 mg) by mouth daily (with dinner) 60 tablet 11     montelukast (SINGULAIR) 10 MG tablet Take 1 tablet (10 mg) by mouth At Bedtime 30 tablet 1     acetaminophen (TYLENOL) 325 MG tablet Take 1-2 tablets (325-650 mg) by mouth every 6 hours as needed for pain (headache) 250 tablet 0     metroNIDAZOLE (NORITATE) 1 % cream Apply topically daily       desonide (DESOWEN) 0.05 % cream Apply topically 2 times daily       ketoconazole (NIZORAL) 2 % shampoo Apply topically daily as needed        "fluocinonide (LIDEX) 0.05 % external solution Apply topically 2 times daily To areas of itch on the scalp as needed. 60 mL 11     albuterol (PROAIR HFA, PROVENTIL HFA, VENTOLIN HFA) 108 (90 BASE) MCG/ACT inhaler Inhale 2 puffs into the lungs every 6 hours as needed for shortness of breath / dyspnea or wheezing 3 Inhaler 1     calcium carbonate (TUMS) 500 MG chewable tablet Take 3-4 chew tab by mouth daily as needed.       fexofenadine (ALLEGRA) 180 MG tablet Take 1 tablet by mouth daily as needed. 90 tablet 3     olopatadine (PATANOL) 0.1 % ophthalmic solution Place 1 drop into both eyes 2 times daily as needed for allergies. 5 mL 12     No facility-administered encounter medications on file as of 2/16/2018.        Allergies   Allergen Reactions     Amoxicillin-Pot Clavulanate      Augmentin      Unknown possible hives and edema     Azithromycin      Diatrizoate Other (See Comments)     Pt wants this listed because she is allergic to shellfish      Imitrex [Sumatriptan]      Severe face/neck/chest tightness and flushing side effects      Penicillins Hives     Unknown      Pork Allergy      Stomach pain, cramping, diarrhea, itching, nausea and headaches     Shellfish Allergy Hives and Swelling     Sulfa Drugs Hives and Swelling     Zithromax [Azithromycin Dihydrate] Swelling     Unknown          Ph.E:    Vitals:    02/16/18 1632   BP: 118/75   Pulse: 91   Temp: 98.7  F (37.1  C)   TempSrc: Oral   Weight: 74.4 kg (164 lb)   Height: 1.6 m (5' 3\")           Constitutional: WD/WN. Pleasant. In no acute distress. Obese.  Eyes: Swelling around R eye improved since last visit, ptosis is improved. EOMI  HEENT: No oral ulcers or thrush. Normal salivary pool.  Neck: No cervical LAP, + thyromegaly  Chest: Clear to auscultation bilaterally  CV: RRR, no murmurs/ rubs or gallops. No edema, clubbing or cyanosis.   GI: Abdomen is soft and non tender.  MS: No synovitis or joint tenderness. Cool joints. No joint deformities. Full ROM " of the joints. No nodules. +Fibromyalgia trigger points.  Skin: No livedo, periungual erythema, digital ulcers or nail changes. + alopecia with scales, facial malar erythema (looks like seborrhoic dermatitis).  Neuro: A&O x 3. Grossly non focal, muscular power 5/5 in all ext  Psych: NL affect and mood    Assessment/ plan:    1-Seropositive non-erosive RA (arthritis, AM stiffness, high CRP, RF 26 but re-check neg 3/2015 on HCQ) Dx 5/2013, FMS, new pleuritic CP 3/20-15-5/2015 resolved on steroids. She is on  mg bid since 5/2014. She tolerates it well and it's helping some but not enough to control all her pain. Continues having flare ups of joint pain, swelling also has FMS. Joint sx are getting worse. Had high ESR/CRP in 3/2015 and new pleuritic CP between 3/2015-5/2015 which resolved after taking medrol dose pack prescribed 5/20/2015. Her pleuritic CP could be related to her RA. Then stopped tramadol as it blames it for recent episodes of CP.    In the past, MTX was recommended, she declined.    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 Etiology of mass unclear, but it's inflammatory and related to pt's underlying autoimmune disease (inflammatory arthritis, serositis). 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    Her work up showed borderline + ALLI and slightly elevated MPO. I told her prednisone is not good for her diabetes and weight gain. Recommended a trial of MTX as next step. Discussed risks on details. I called her neuro-ophthalmologist at last visit who agreed with trial of MTX (she suspects pseudo-sarcoidosis or sarcoid like disease but no granuloma and ACE not elevated).     Unfortunately despite all my efforts to explain risks vs benefits (more than risks) of MTX as  steroid sparing gent, Janine declined to try MTX. I was very concerned about her R orbital inflammatory mass as there is high chance of flare up off steroids and steroid causes her weigh gain and uncontrolled diabetes. I even offered her other steroid sparing gents like imuran. She declined that as well.    Her inflammation around R orbit has recurred now. On 4/7/2017, I spent quite amount of time discussing a trial of MTX. After discussing risks/benefits, she agreed to try it.     She is s/p Tx with MTX 10 mg po qwk 4/2017-5/2017. No benefits and more GI SEs, more hair loss and headaches since start of MTX. No benefits. I called and spoke with her neuro-ophthalmologist who recommended a second opinion from neuro-ophthalmology at the . I suspect her presentation to be ANCA vasculitis related but wanted to repeat imaging and get neuro-ophthalmology eval here. She was recommended to have repeat biopsy to r/o lymphoma.    In 5/2017, prescribed her prednisone 40-30-20-10 mg a day each for 3 days then stop (she declined higher dose and longer taper despite my concern for worsening swelling around orbit), Her R campos-orbital edema significantly improved. MTX was stopped in 5/2017 given side effects. Plan was to start imuran 50 mg po qd (NL TPMT); however we decided to hold off till she gets biopsy done.    Repeat R lacrimal gland biopsy in 8/2017 showed non specific inflammation, it ruled out lymphoma. Her R orbital swelling is improved but still present. After her biopsy I contacted her to schedule a f/u visit with me to discuss plan of care but she declined till today's visit.    She did not tolerate AZA because of GI SEs.    She continues to have R campos-orbital swelling, fatigue and joint pain (part of it is due to FMS). 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 Even with rare risk of PML  without confirmation  of vasculitis, 2 biopsies have ruled out lymphoma and showed inflammation. This is definitely an inflammatory process and is steroid responsive, she had local kenalog inj in 8/2017 at the time of biopsy which has helped. Given her diabetes and long term SE of prednisone, highly recommend steroid sparing agent to prevent progression/recurrence of R campos-orbital swelling. Since she did not tolerate MTX and AZA, recommend rituximab as next step.     I spent 30 minutes discussing test results, plan of care, rituximab SEs including rare risk of PML. She is not ready to try rituximab but is going to think about it.        PLAN:    Recommend rituximab 1 gram q2wk x 2. Labs today.    CT chest/abdomen/pelvis 4/2017 was neg for malignancy. 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 Repeat MPO was positive in 6/2017.    Continue accupuncture.     My impression is that her arthralgias are a combination of IA, fibromyalgia and chronic pain.  Stopped HCQ at last visit, shortly after resumed taking it as arthralgia recurred. Continue HCQ. Eye exam is updated.      2-Fad pads. She was seen by endocrinology and cushing was ruled out in 4/2014. Was advised to do f/u for enlarged thyroid/thyroid nodules.    3-Hair loss. F/U with dermatology    4-Chronic pain. F/U with pain clinic    5-Concerns of subclinical hyperthyroidism: TSH is barely minimal, chart review shows that those lowest levels are since April 2014. At last visit, discussed Endocrinology ref which pt wants to hold off in this visit.     6-Vit D deficiency. Was replaced with vit D 50, 000 units po qwk x 12 wk then 2000 units qd      PLAN: Follow up in May    MEDICATION CHANGES: as above      Orders Placed This Encounter   Procedures     ALT     Albumin level     CBC with platelets differential     Creatinine     CRP inflammation     Erythrocyte sedimentation rate auto     Routine UA with micro reflex to culture     Vitamin D Deficiency     SSA Ro SUNNY Antibody  IgG     SSB La SUNNY Antibody IgG     AST     Antineutrophil cytoplasmic Marline IgG     Vasculitis panel     Hepatitis B surface antigen     Hepatitis B core antibody     Hepatitis C antibody     M Tuberculosis by [employer]ifer[employer] Gold     Again, thank you for allowing me to participate in the care of your patient.      Sincerely,    Majo Mckinnon MD

## 2018-02-16 NOTE — PROGRESS NOTES
Rheumatology F/U Note    Last visit note: 6/15/2017    Today's visit date: 2/16/2018    Reason for visit: RA, Fibromyalgia     HPI from last visit    Ms. Cornell is a 49 yo AAF who was referred to our clinic for evaluation and management of her joint pain in setting of positive RF 26.    Her joint symptoms first started more than a year ago, but over last 6-8 months fluctuating some good and bad days . All her joints are involved. Over last 5 months, hands became swollen, warm and painful. Tylenol does not help. Ketoprofen did not help. Was told to avoid NSAIDs given ACS. Reports AM/PM stiffness x 2-3 hr, can't make full fist when wakes up in AM.    She feels tired all the time. Reports worsening hair loss and unchanged  facial rash. Gets red sore flaky rash over cheeks across her face which is intermittent diagnosed Rosacea and is prescribed metronidazole gel which she has not started using. Reports occasional oral ulcers. Hands feel cold with red discoloration last winter. Has occasional dysphagia to both solids and liquid. Has dry eyes, is using OTC allergy eyedrops. Has chronic abdominal pain. Has h/o pancreatitis. Sometime has anxiety. Occasionally has numbness, tingling in fingers/toes. Has back and spine issues, her whole back and neck hurts, different areas at different time. She is wondering if she has FMS. Has hard time sleeping, has never been diagnosed with BRENNA. She does not know if he snores. She was found to have slightly positive RF in 2/2013. Has microcytic anemia.     Her arthralgia gets worse with drop in temperature. Reports body ache. Activity makes her pain worse. Her joint swelling isintermittent. Her body pain and joint pain is the same. Reports AM stiffness x 3 hr.    She has been doing acupuncture x few months and it is helping with her pain. She thinks that the combination of plaquenil and acupuncture is helpful but overall she notice 30% in her symptoms relief.     Takes tylenol and  tramadol on occasion for pain.    She decided to use different formulation of metformin which helped with her abdominal pain. I referred her to GI for evaluation of elevated lipase and abdominal pain. She decided to see GI in the future.       She is on  mg qd since 5/2014, tolerates it well and it helps her some. Denies taking any gabapentin due to chest pain and headches. She has had referral her for water aerobics, but she can't begin until she's healed from recent surgery in 10/2014. She thinks HCQ is helping but not enough to control all her pain, reports 2-3 hr of AM stiffness with ongoing diffuse arthralgia/myalgia along with intermittent swelling of hands. She does see her acupuncture person and it helps with her pain, has not started water aerobics yet. Has got her flu shot.       10/2015: Reports having pleuritic CP in 3/2015, was prescribed Lidoderm patches first. It did not help. Took prednisone (?dose) by her own, pain got better but it recurred off prednisone and gradually got worse to the point that she could not stand the pain anymore, was seen in ER in 5/2015, was treated with medrol dose pack which helped. Reports pain over hips, knees, ankles and fingers. Pain gets worse with walking. Reports myalgia, swelling of the arms. Pain over neck, shoulders. Has AM stiffness x 3-4 hr. Fingers swell up and become painful. Can't remember if steroid helped with joint pain. She had headaches, severe CP when she took tramadol and gabapentin and stopped taking them, sx resolved but she can't tell which one caused SEs but thinks that probably it was gabapentin. She has good days and tries to be more active but activity aggravates the pain. Headaches are intermittent. HCQ helps some not enough to control all the pain. No HCQ SEs. Had eye exam around July 2015. Flexeril helps but PCP wants to change flexeril to zanaflex, reporting that she is NOT comfortable with the change. Acupuncture still helps an she  continued to  do that. Concerned about fat pads she has in supraclavicular area, has h/o thyroid nodules. Continues having hair loss, takes iron for iron deficiency.     2/2016: She has 2 major complaints today, increased joint pain/swelling in hands/feet and recent Dx of optic neuritis in R eye, reports having similar problem about 20 yr ago, now recurred. Has a spot in R eye vision x long term, was seen by ophthalmology few days ago and was told to have optic neuritis. Gets joint pain, muscle pain. Can't  objects, hands are swollen. Knees, hips and ankles are painful. Reports more arthralgia. AM stiffness/ pain is 3-4 hr. She wants me to talk to her ophthalmologist directly.      She had botox inj for migraines which did not help her. She is going to have angiogram next wk, had to take more nitro for CP recently.       4/29/2016: She reports being on steroid taper x 3 since last visit. Reportedly, had orbit MRI, it showed swelling/inflamamtion of R lacrimal glands. She had painful swelling of her R eye, cause her headaches. Botox inj made her headaches worse. She is being dealing with this since 1/2016, with severe headaches and pain/swelling around R eye. Had biopsy in 3/2016. She is frustrated as prednisone causes her weight gain and her BG is difficult to control on it.    5/2016: At last visit, was prescribed MTX 10 mg po qwk to take along with FA 1 mg qd. She decided not to take MTX, is afraid of side effects, believes all these medications would harm her. She also believes that botox caused her inflammation around R eye. No major flare up of per-orbital inflamamtion since last visit but continues to have swelling around her R eye.      11/2016: Reports diffuse body ache, arthralgia, myalgia especially with weather change. No major flare up of campos-orbital inflammation but her face/around R eye swells up from time to time. Reports 2 episodes of CP over past 2 mo, nitro sometimes helps and sometimes does  not help. She has asthma and costochondritis and she has hard time to distinguish the origin of pain. Sometimes knees and fingers swell up. Her stiffness is sometimes all day.    4/2017:  Reports having sinusitis since last visit. Her R eye is dry and is using eyedrops to keep it moist. Reports having pain in ears. Was treated with antibiotics by PCP, it got better then it got worse. Reports catching infections easily. Then started having pressure over eyes with pain/headaches (exact start date is unknown). Pain gradually got worse and became persistent. Had sinus CT.     4/7/2017: Having a flare up of swelling, pain around her R orbit. Has not tried MTX yet.      5/2017: On MTX 10 mg po qwk since 4/7/2017, reports increased stomach pain and nausea and new headaches since start of MTX and it's not helping. Pain/swelling around R orbit is worse.    6/2017: She finished prednisone taper prescribed at last visit, R campos-orbital swelling significantly improved. 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    Today: R campos-orbital swelling has improved. Repeat R lacrimal gland biopsy in 8/2017 showed non specific inflammation with no lymphoma. She is off steroid. Did not tolerate AZA due to N/V and abdominal pain.        Her records were reviewed.        Component      Latest Ref Rng 2/28/2013 2/28/2013          12:14 PM 12:14 PM   WBC      4.0 - 11.0 10e9/L     RBC Count      3.8 - 5.2 10e12/L     Hemoglobin      11.7 - 15.7 g/dL     Hematocrit      35.0 - 47.0 %     MCV      78 - 100 fl     MCH      26.5 - 33.0 pg     MCHC      31.5 - 36.5 g/dL     RDW      10.0 - 15.0 %     Platelet Count      150 - 450 10e9/L     Specimen Description           Lyme Screen IgG and IgM           Vitamin D Deficiency screening      30 - 75 ug/L     Ferritin      10 - 300 ng/mL     Sed Rate       0 - 20 mm/h     ALLI Screen by EIA      <1.0     Rheumatoid Factor      0 - 14 IU/mL     CK Total      30 - 225 U/L  78   Uric Acid      2.5 - 7.5 mg/dL 6.7      Component      Latest Ref Rng 2/28/2013          12:14 PM   WBC      4.0 - 11.0 10e9/L    RBC Count      3.8 - 5.2 10e12/L    Hemoglobin      11.7 - 15.7 g/dL    Hematocrit      35.0 - 47.0 %    MCV      78 - 100 fl    MCH      26.5 - 33.0 pg    MCHC      31.5 - 36.5 g/dL    RDW      10.0 - 15.0 %    Platelet Count      150 - 450 10e9/L    Specimen Description       Serum   Lyme Screen IgG and IgM       Test value: <0.75....Interpretation: Negative....If you highly suspect Lyme . . .   Vitamin D Deficiency screening      30 - 75 ug/L    Ferritin      10 - 300 ng/mL    Sed Rate      0 - 20 mm/h    ALLI Screen by EIA      <1.0    Rheumatoid Factor      0 - 14 IU/mL    CK Total      30 - 225 U/L    Uric Acid      2.5 - 7.5 mg/dL      Component      Latest Ref Rng 2/28/2013 2/28/2013 2/28/2013 2/28/2013          12:14 PM 12:14 PM 12:14 PM 12:14 PM   WBC      4.0 - 11.0 10e9/L       RBC Count      3.8 - 5.2 10e12/L       Hemoglobin      11.7 - 15.7 g/dL       Hematocrit      35.0 - 47.0 %       MCV      78 - 100 fl       MCH      26.5 - 33.0 pg       MCHC      31.5 - 36.5 g/dL       RDW      10.0 - 15.0 %       Platelet Count      150 - 450 10e9/L       Specimen Description             Lyme Screen IgG and IgM             Vitamin D Deficiency screening      30 - 75 ug/L       Ferritin      10 - 300 ng/mL    10   Sed Rate      0 - 20 mm/h   23 (H)    ALLI Screen by EIA      <1.0  <1.0 . . .     Rheumatoid Factor      0 - 14 IU/mL 26 (H)      CK Total      30 - 225 U/L       Uric Acid      2.5 - 7.5 mg/dL         Component      Latest Ref Rng 2/28/2013 2/28/2013          12:14 PM 12:14 PM   WBC      4.0 - 11.0 10e9/L 14.1 (H)    RBC Count      3.8 - 5.2 10e12/L 4.55    Hemoglobin      11.7 - 15.7 g/dL 10.7 (L)    Hematocrit      35.0 - 47.0 % 33.3 (L)    MCV      78  - 100 fl 73 (L)    MCH      26.5 - 33.0 pg 23.5 (L)    MCHC      31.5 - 36.5 g/dL 32.1    RDW      10.0 - 15.0 % 18.1 (H)    Platelet Count      150 - 450 10e9/L 407    Specimen Description           Lyme Screen IgG and IgM           Vitamin D Deficiency screening      30 - 75 ug/L  32   Ferritin      10 - 300 ng/mL     Sed Rate      0 - 20 mm/h     ALLI Screen by EIA      <1.0     Rheumatoid Factor      0 - 14 IU/mL     CK Total      30 - 225 U/L     Uric Acid      2.5 - 7.5 mg/dL          MRI CERVICAL SPINE WITHOUT CONTRAST 4/3/2013 12:47 PM    HISTORY: Cervicalgia. Degeneration of cervical intervertebral disc.  MRI cervical spine. Evaluate bilateral supraclavicular lymph nodes.  Clinical enlargement.    TECHNIQUE: Multiplanar multisequence MRI of the cervical spine  without gadolinium contrast.    COMPARISON: None.    FINDINGS: The patient has a developmentally small central canal.  Images of the cervical cord reveals small areas of T2 hyperintensity  within the cervical cord. There is a small area of high signal  intensity at the C1 level. There is also a small area of high signal  intensity at the C6-C7 level. The area of high signal at C6-C7 is  located at an area of central spinal stenosis. This may be due to  myelomalacia. The area of high signal at C1-C2 is indeterminate.    C2-C3: Normal disc, facet joints, spinal canal and neural foramina.    C3-C4: Normal disc, facet joints, spinal canal and neural foramina.    C4-C5: Normal disc, facet joints, spinal canal and neural foramina.    C5-C6: There is degeneration of the disc. There is a mild annular  disc bulge. The central canal is mild-moderately narrowed. The  residual AP diameter of the central spinal canal measures  approximately 9 mm.    C6-C7: There is degeneration of the disc. There is loss of disc space  height. There is a diffuse annular disc bulge. There are associated  posterior osteophytes. There are severe central spinal stenosis at  this level.  The residual AP diameter of the central spinal canal is  only about 6 mm. There is significant flattening of the cord. There  is high signal in the cord at this level consistent with  myelomalacia. There is moderate to severe right foraminal stenosis  due to and uncinate spur.    C7-T1: Normal disc, facet joints, spinal canal and neural foramina.            Result Impression       IMPRESSION:  1. Severe central spinal stenosis at C6-C7 due to a developmentally  small canal and due to a bulging disc and associated posterior  osteophyte. There is flattening of the cord at this level and high  signal in the cord at this level consistent with myelomalacia.  2. There is a small, indeterminate 2-3 mm area of high signal  intensity in the cord at the C1 level. This could be due to gliosis  secondary to a previous infectious or inflammatory process.  Demyelinating disease could also have a similar appearance. Clinical  correlation suggested.    GAIL MORE MD     MRI LEFT UPPER EXTREMITY NON-JOINT WITHOUT CONTRAST, MRI RIGHT UPPER  EXTREMITY NON-JOINT WITHOUT CONTRAST April 3, 2013 1:32 PM    HISTORY: Cervicalgia. Degeneration of cervical intervertebral disc.    TECHNIQUE: Multiplanar, multisequence imaging of the brachial plexus  was performed without gadolinium contrast enhancement.    COMPARISON: None.    FINDINGS: No mass lesions are seen. No inflammatory process is  identified. The roots, trunks, branches, and divisions of both the  right and left brachial plexus appear normal. No adenopathy is seen.  The anterior scalene muscles and the middle scalene muscles appear  normal. Nodules are seen within the thyroid gland. The largest nodule  is seen in the left lobe of the thyroid. This measures about 1.8 cm  in diameter.            Result Impression       IMPRESSION:  1. Both the right and left brachial plexus regions appear normal.  2. Thyroid nodules. The largest nodule is in the left lobe of the  thyroid. It measures  about 1.8 cm in diameter.       XR WRIST BILATERAL G/E 3 VIEWS    Narrative:        EXAMINATION:  1. Each hand 3 views  2. Each wrist 3 views     DATE: 5/1/2013     HISTORY: Arthropathy with concern for rheumatoid.     FINDINGS: 3 views of each hand and 3 views of each wrist are obtained  without prior for comparison. Alignment is normal. There is no  fracture or acute bone abnormality. No distinct erosions are seen.  Some spurring of the distal radial ulnar joint is noted on the right.       Impression:     IMPRESSION:  1. No evidence of an inflammatory arthropathy involving either hand  or wrist.     VIELKA GUEVARA MD         XR FOOT BILATERAL G/E 3 VIEWS    Narrative:        EXAMINATION:  1. Each foot 3 views  2. Each ankle 3 views  3. Sacroiliac joint series     DATE: 5/1/2013     HISTORY: Arthropathy; concern for rheumatoid.     FINDINGS: No prior for comparison.     A frontal and bilateral oblique evaluation of the sacroiliac joints  shows no malalignment. Some patchy sclerosis is identified along the  central aspect of both sacroiliac joints, which is favored to be  degenerative. No distinct erosions are seen. The visualized portion  of the hip joint spaces appear maintained, with no erosive changes  identified. Mild endplate spurring is noted in the lower lumbar  spine.     3 views of each foot and 3 views of each ankle show no evidence of  acute fracture or dislocation. The metatarsal phalangeal,  tarsometatarsal and intertarsal joint spaces appear maintained. No  erosions are identified. There is no sign of acroosteolysis. The  ankle mortise and talar dome are intact bilaterally. Minimal spurring  is noted along the tip of the medial malleolus bilaterally.       Impression:     IMPRESSION:  1. Patchy sclerosis identified along the central aspect of both  sacroiliac joints, which is favored to be degenerative. No distinct  erosions are seen.  2. No evidence of an inflammatory arthropathy in either foot or  ankle.     VIELKA GUEVARA MD     5/2013  CBC WITH PLATELETS DIFFERENTIAL       Component Value Range    WBC 11.3 (*) 4.0 - 11.0 10e9/L    RBC Count 4.56  3.8 - 5.2 10e12/L    Hemoglobin 11.1 (*) 11.7 - 15.7 g/dL    Hematocrit 34.3 (*) 35.0 - 47.0 %    MCV 75 (*) 78 - 100 fl    MCH 24.3 (*) 26.5 - 33.0 pg    MCHC 32.4  31.5 - 36.5 g/dL    RDW 16.1 (*) 10.0 - 15.0 %    Platelet Count 315  150 - 450 10e9/L    Diff Method Automated Method      % Neutrophils 71.6  40 - 75 %    % Lymphocytes 20.9  20 - 48 %    % Monocytes 4.3  0 - 12 %    % Eosinophils 2.8  0 - 6 %    % Basophils 0.2  0 - 2 %    % Immature Granulocytes 0.2  0 - 0.4 %    Absolute Neutrophil 8.1  1.6 - 8.3 10e9/L    Absolute Lymphocytes 2.4  0.8 - 5.3 10e9/L    Absolute Monoctyes 0.5  0.0 - 1.3 10e9/L    Absolute Eosinophils 0.3  0.0 - 0.7 10e9/L    Absolute Basophils 0.0  0.0 - 0.2 10e9/L    Abs Immature Granulocytes 0.0  0 - 0.03 10e9/L   AMYLASE       Component Value Range    Amylase 103  30 - 110 U/L   COMPREHENSIVE METABOLIC PANEL       Component Value Range    Sodium 144  133 - 144 mmol/L    Potassium 3.8  3.4 - 5.3 mmol/L    Chloride 105  94 - 109 mmol/L    Carbon Dioxide 23  20 - 32 mmol/L    Anion Gap 17  6 - 17 mmol/L    Glucose 173 (*) 60 - 99 mg/dL    Urea Nitrogen 13  5 - 24 mg/dL    Creatinine 0.83  0.52 - 1.04 mg/dL    GFR Estimate 74  >60 mL/min/1.7m2    GFR Estimate If Black 90  >60 mL/min/1.7m2    Calcium 9.7  8.5 - 10.4 mg/dL    Bilirubin Total 0.4  0.2 - 1.3 mg/dL    Albumin 4.3  3.9 - 5.1 g/dL    Protein Total 7.8  6.8 - 8.8 g/dL    Alkaline Phosphatase 66  40 - 150 U/L    ALT 36  0 - 50 U/L    AST 28  0 - 45 U/L   CK TOTAL       Component Value Range    CK Total 66  30 - 225 U/L   CRP INFLAMMATION       Component Value Range    CRP Inflammation 10.4 (*) 0.0 - 8.0 mg/L   LIPASE       Component Value Range    Lipase 353 (*) 20 - 250 U/L   ERYTHROCYTE SEDIMENTATION RATE AUTO       Component Value Range    Sed Rate 26 (*) 0 - 20 mm/h   ROUTINE  UA WITH MICROSCOPIC REFLEX TO CULTURE       Component Value Range    Color Urine Yellow      Appearance Urine Slightly Cloudy      Glucose Urine 30 (*) NEG mg/dL    Bilirubin Urine Negative  NEG    Ketones Urine 5 (*) NEG mg/dL    Specific Gravity Urine 1.026  1.003 - 1.035    Blood Urine Trace (*) NEG    pH Urine 5.0  5.0 - 7.0 pH    Protein Albumin Urine 10 (*) NEG mg/dL    Urobilinogen mg/dL Normal  0.0 - 2.0 mg/dL    Nitrite Urine Negative  NEG    Leukocyte Esterase Urine Negative  NEG    Source Midstream Urine      WBC Urine 1  0 - 2 /HPF    RBC Urine 4 (*) 0 - 2 /HPF    Squamous Epithelial /HPF Urine <1  0 - 1 /HPF    Mucous Urine Present (*) NEG /LPF    Hyaline Casts 3 (*) 0 - 2 /LPF    Calcium Oxalate Moderate (*) NEG /HPF   COMPLEMENT C3       Component Value Range    Complement C3 143  76 - 169 mg/dL   COMPLEMENT C4       Component Value Range    Complement C4 31  15 - 50 mg/dL   HEPATITIS C ANTIBODY       Component Value Range    Hepatitis C Antibody Negative  NEG   CARDIOLIPIN ANTIBODY IGG AND IGM       Component Value Range    Cardiolipin IgG Marline    0 - 15.0 GPL    Value: <15.0      Interpretation:  Negative    Cardiolipin IgM Marline    0 - 12.5 MPL    Value: <12.5      Interpretation:  Negative   LUPUS PANEL       Component Value Range    Lupus Result    NEG    Value: Negative      (Note)      COMMENTS:      The INR is normal.      APTT is normal.  1:2 Mix is not indicated.      DRVVT Screen is normal.      Thrombin time is normal.      NEGATIVE TEST; A LUPUS ANTICOAGULANT WAS NOT DETECTED IN THIS      SPECIMEN WITHIN THE LIMITS OF THE TESTING REPERTOIRE.      If the clinical picture is strongly suggestive of an antiphospholipid      syndrome, recommend anticardiolipin and beta-2-glycoprotein (IgG and      IgM) antibody tests.      Leela Franks M.D.  [phone]      5/2/2013            INR =  0.93 N = 0.86-1.14  (No additional charge)      TT = 15.7 N = 13.0-19.0 sec  (No additional charge)             APTT'S:    Seconds      Reagent =  Stago LA      Normal  =  38      Patient  =  35      1:2 Mix  =  N/A      Reference =  31-43             DILUTE MADELINE VIPER VENOM TEST:      DRVVT Screen Ratio = 0.76 Normal = <1.21         IMMUNOGLOBULIN G SUBCLASSES       Component Value Range    IGG 1030  695 - 1620 mg/dL    IgG1 488  300 - 856 mg/dL    IgG2 325  158 - 761 mg/dL    IgG3 47  24 - 192 mg/dL    IgG4 18  11 - 86 mg/dL   SUNNY ANTIBODY PANEL       Component Value Range    Ribonucleic Protein IgG Antibody 0      Smith Antibody IgG 1      SSA (RO) Antibody IgG 4      SSB (LA) Antibody IgG 0      Scleroderma Antibody IgG 0     BETA 2 GLYCOPROTEIN ANTIBODIES IGG IGM       Component Value Range    Beta-2-Glycopro IgG 1      Beta-2-Glycopro IgM 5     CYCLIC CIT PEPT IGG       Component Value Range    Cyclic Cit Pept IgG/IgA    <20 UNITS    Value: <20      Interpretation:  Negative   DNA DOUBLE STRANDED ANTIBODIES       Component Value Range    DNA-ds    0 - 29 IU/mL    Value: <15      Interpretation:  Negative       Component      Latest Ref Rn 3/11/2014   Sodium      133 - 144 mmol/L 137   Potassium      3.4 - 5.3 mmol/L 4.6   Chloride      94 - 109 mmol/L 97   Carbon Dioxide      20 - 32 mmol/L 20   Anion Gap      6 - 17 mmol/L 20 (H)   Glucose      60 - 99 mg/dL 243 (H)   Urea Nitrogen      5 - 24 mg/dL 35 (H)   Creatinine      0.52 - 1.04 mg/dL 1.47 (H)   GFR Estimate      >60 mL/min/1.7m2 38 (L)   GFR Estimate If Black      >60 mL/min/1.7m2 46 (L)   Calcium      8.5 - 10.4 mg/dL 9.7   Bilirubin Total      0.2 - 1.3 mg/dL 0.3   Albumin      3.9 - 5.1 g/dL 4.8   Protein Total      6.8 - 8.8 g/dL 7.9   Alkaline Phosphatase      40 - 150 U/L 73   ALT      0 - 50 U/L 35   AST      0 - 45 U/L 30   Color Urine       Yellow   Appearance Urine       Clear   Glucose Urine      NEG mg/dL 500 (A)   Bilirubin Urine      NEG Negative   Ketones Urine      NEG mg/dL Negative   Specific Gravity Urine      1.003 - 1.035 1.020    Blood Urine      NEG Negative   pH Urine      5.0 - 7.0 pH 5.5   Protein Albumin Urine      NEG mg/dL Negative   Urobilinogen Urine      0.2 - 1.0 EU/dL 0.2   Nitrite Urine      NEG Negative   Leukocyte Esterase Urine      NEG Negative   Source       Midstream Urine   WBC      4.0 - 11.0 10e9/L 14.0 (H)   RBC Count      3.8 - 5.2 10e12/L 5.02   Hemoglobin      11.7 - 15.7 g/dL 12.4   Hematocrit      35.0 - 47.0 % 37.1   MCV      78 - 100 fl 74 (L)   MCH      26.5 - 33.0 pg 24.7 (L)   MCHC      31.5 - 36.5 g/dL 33.4   RDW      10.0 - 15.0 % 15.3 (H)   Platelet Count      150 - 450 10e9/L 420       Component      Latest Ref Rng 3/25/2014   Sodium      133 - 144 mmol/L 137   Potassium      3.4 - 5.3 mmol/L 3.7   Chloride      94 - 109 mmol/L 100   Carbon Dioxide      20 - 32 mmol/L 21   Anion Gap      6 - 17 mmol/L 16   Glucose      60 - 99 mg/dL 214 (H)   Urea Nitrogen      5 - 24 mg/dL 20   Creatinine      0.52 - 1.04 mg/dL 1.02   GFR Estimate      >60 mL/min/1.7m2 58 (L)   GFR Estimate If Black      >60 mL/min/1.7m2 70   Calcium      8.5 - 10.4 mg/dL 9.5   Phosphorus      2.5 - 4.5 mg/dL 3.7   Albumin      3.9 - 5.1 g/dL 4.6     Component      Latest Ref Rng 4/30/2014   CRP Inflammation      0.0 - 8.0 mg/L 10.0 (H)   Sed Rate      0 - 20 mm/h 39 (H)   Rheumatoid Factor      <20 IU/mL <20   Glucose-6-PO4 Dehydrogenase       17.7     Component      Latest Ref Rng 6/11/2014 7/15/2014   WBC      4.0 - 11.0 10e9/L 11.0    RBC Count      3.8 - 5.2 10e12/L 4.74    Hemoglobin      11.7 - 15.7 g/dL 11.8    Hematocrit      35.0 - 47.0 % 36.1    MCV      78 - 100 fl 76 (L)    MCH      26.5 - 33.0 pg 24.9 (L)    MCHC      31.5 - 36.5 g/dL 32.7    RDW      10.0 - 15.0 % 13.9    Platelet Count      150 - 450 10e9/L 434    Diff Method       Automated Method    % Neutrophils       59.2    % Lymphocytes       31.6    % Monocytes       5.9    % Eosinophils       2.6    % Basophils       0.5    % Immature Granulocytes       0.2     Absolute Neutrophil      1.6 - 8.3 10e9/L 6.5    Absolute Lymphocytes      0.8 - 5.3 10e9/L 3.5    Absolute Monoctyes      0.0 - 1.3 10e9/L 0.7    Absolute Eosinophils      0.0 - 0.7 10e9/L 0.3    Absolute Basophils      0.0 - 0.2 10e9/L 0.1    Abs Immature Granulocytes      0 - 0.4 10e9/L 0.0    Sodium      133 - 144 mmol/L 138 140   Potassium      3.4 - 5.3 mmol/L 3.9 3.8   Chloride      94 - 109 mmol/L 97 103   Carbon Dioxide      20 - 32 mmol/L 26 22   Anion Gap      6 - 17 mmol/L 14.9 15   Glucose      60 - 99 mg/dL 111 (H) 163 (H)   Urea Nitrogen      5 - 24 mg/dL 28 (H) 15   Creatinine      0.52 - 1.04 mg/dL 1.10 (H) 0.99   GFR Estimate      >60 mL/min/1.7m2 53 (L) 60 (L)   GFR Estimate If Black      >60 mL/min/1.7m2 64 72   Calcium      8.5 - 10.4 mg/dL 10.3 9.3   Bilirubin Total      0.2 - 1.3 mg/dL 0.6 0.2   Albumin      3.9 - 5.1 g/dL 5.1 4.2   Protein Total      6.8 - 8.8 g/dL 9.0 (H) 7.2   Alkaline Phosphatase      40 - 150 U/L 87 69   ALT      0 - 50 U/L 37 33   AST      0 - 45 U/L 40 26   Color Urine       Straw    Appearance Urine       Clear    Glucose Urine      NEG mg/dL Negative    Bilirubin Urine      NEG Negative    Ketones Urine      NEG mg/dL Negative    Specific Gravity Urine      1.003 - 1.035 1.005    Blood Urine      NEG Negative    pH Urine      5.0 - 7.0 pH 5.0    Protein Albumin Urine      NEG mg/dL Negative    Urobilinogen mg/dL      0.0 - 2.0 mg/dL Normal    Nitrite Urine      NEG Negative    Leukocyte Esterase Urine      NEG Negative    Source       Midstream Urine    Lipase      20 - 250 U/L 403 (H)    CRP Inflammation      0.0 - 8.0 mg/L <5.0    N-Terminal Pro Bnp      0 - 125 pg/mL  55   Hemoglobin A1C      4.3 - 6.0 %  7.0 (H)     Component      Latest Ref Rng 11/12/2014   Sodium      133 - 144 mmol/L 135   Potassium      3.4 - 5.3 mmol/L 3.8   Chloride      94 - 109 mmol/L 104   Carbon Dioxide      20 - 32 mmol/L 24   Anion Gap      3 - 14 mmol/L 7   Glucose      70 - 99  mg/dL 125 (H)   Urea Nitrogen      7 - 30 mg/dL 17   Creatinine      0.52 - 1.04 mg/dL 1.18 (H)   GFR Estimate      >60 mL/min/1.7m2 49 (L)   GFR Estimate If Black      >60 mL/min/1.7m2 59 (L)   Calcium      8.5 - 10.1 mg/dL 9.8   WBC      4.0 - 11.0 10e9/L 11.1 (H)   RBC Count      3.8 - 5.2 10e12/L 4.05   Hemoglobin      11.7 - 15.7 g/dL 9.8 (L)   Hematocrit      35.0 - 47.0 % 30.6 (L)   MCV      78 - 100 fl 76 (L)   MCH      26.5 - 33.0 pg 24.2 (L)   MCHC      31.5 - 36.5 g/dL 32.0   RDW      10.0 - 15.0 % 15.5 (H)   Platelet Count      150 - 450 10e9/L 484 (H)   CT CHEST PULMONARY EMBOLISM W CONTRAST 5/20/2015 4:57 PM  HISTORY: Pain. SOB. Elevated d-dimer.   TECHNIQUE: 65 mL Isovue 370. Axial images with coronal  reconstructions.  COMPARISON: None.  FINDINGS: Calcified granuloma with surrounding scarring in the  posterolateral left upper lobe. Triangular shaped opacity at the right  lung base in the lateral right middle lobe could represent some  scarring, atelectasis or infiltrate. There is also some scarring or  atelectasis in the posteromedial right lung base. Lungs otherwise  clear.  The pulmonary arteries are well opacified. No CT evidence for acute  pulmonary embolus. No aortic dissection.  Diffuse fatty infiltration of the liver.  IMPRESSION  IMPRESSION:   1. No pulmonary embolus identified.  2. Small focus of atelectasis, infiltrate or scarring in the lateral  right middle lobe.  3. Old granulomatous disease.  4. Otherwise negative.  SILVREIO VAZQUEZ MD  Component      Latest Ref Rng 3/27/2015   WBC      4.0 - 11.0 10e9/L 11.8 (H)   RBC Count      3.8 - 5.2 10e12/L 4.53   Hemoglobin      11.7 - 15.7 g/dL 11.0 (L)   Hematocrit      35.0 - 47.0 % 33.8 (L)   MCV      78 - 100 fl 75 (L)   MCH      26.5 - 33.0 pg 24.3 (L)   MCHC      31.5 - 36.5 g/dL 32.5   RDW      10.0 - 15.0 % 15.4 (H)   Platelet Count      150 - 450 10e9/L 419   Diff Method       Automated Method   % Neutrophils       75.3   % Lymphocytes        17.4   % Monocytes       4.4   % Eosinophils       2.5   % Basophils       0.2   % Immature Granulocytes       0.2   Absolute Neutrophil      1.6 - 8.3 10e9/L 8.9 (H)   Absolute Lymphocytes      0.8 - 5.3 10e9/L 2.1   Absolute Monoctyes      0.0 - 1.3 10e9/L 0.5   Absolute Eosinophils      0.0 - 0.7 10e9/L 0.3   Absolute Basophils      0.0 - 0.2 10e9/L 0.0   Abs Immature Granulocytes      0 - 0.4 10e9/L 0.0   Creatinine      0.52 - 1.04 mg/dL 1.12 (H)   GFR Estimate      >60 mL/min/1.7m2 52 (L)   GFR Estimate If Black      >60 mL/min/1.7m2 63   Iron      35 - 180 ug/dL 25 (L)   Iron Binding Cap      240 - 430 ug/dL 346   Iron Saturation Index      15 - 46 % 7 (L)   Albumin      3.4 - 5.0 g/dL 4.0   ALT      0 - 50 U/L 24   AST      0 - 45 U/L 15   CRP Inflammation      0.0 - 8.0 mg/L 28.0 (H)   Sed Rate      0 - 20 mm/h 81 (H)   Rheumatoid Factor      <20 IU/mL <20   Vitamin D Deficiency screening      30 - 75 ug/L 44   Ferritin      8 - 252 ng/mL 34     Component      Latest Ref Rng 7/29/2015   Sodium      133 - 144 mmol/L 137   Potassium      3.4 - 5.3 mmol/L 3.8   Chloride      94 - 109 mmol/L 106   Carbon Dioxide      20 - 32 mmol/L 23   Anion Gap      3 - 14 mmol/L 8   Glucose      70 - 99 mg/dL 148 (H)   Urea Nitrogen      7 - 30 mg/dL 17   Creatinine      0.52 - 1.04 mg/dL 1.02   GFR Estimate      >60 mL/min/1.7m2 58 (L)   GFR Estimate If Black      >60 mL/min/1.7m2 70   Calcium      8.5 - 10.1 mg/dL 9.0   Bilirubin Total      0.2 - 1.3 mg/dL 0.5   Albumin      3.4 - 5.0 g/dL 4.2   Protein Total      6.8 - 8.8 g/dL 7.4   Alkaline Phosphatase      40 - 150 U/L 64   ALT      0 - 50 U/L 23   AST      0 - 45 U/L 19     Results for FAVIO MARTINEZ (MRN [id number]) as of 10/30/2015 17:00   Ref. Range 8/21/2012 09:06 5/22/2013 14:22 4/14/2014 12:06 9/11/2014 12:35 12/4/2014 16:38   TSH Latest Range: 0.40-4.00 mU/L 0.83 0.43 0.27 (L) 0.23 (L) 0.22 (L)     Component      Latest Ref Rng 10/30/2015   WBC      4.0 -  11.0 10e9/L 13.4 (H)   RBC Count      3.8 - 5.2 10e12/L 4.76   Hemoglobin      11.7 - 15.7 g/dL 12.4   Hematocrit      35.0 - 47.0 % 37.9   MCV      78 - 100 fl 80   MCH      26.5 - 33.0 pg 26.1 (L)   MCHC      31.5 - 36.5 g/dL 32.7   RDW      10.0 - 15.0 % 14.5   Platelet Count      150 - 450 10e9/L 324   Diff Method       Automated Method   % Neutrophils       67.7   % Lymphocytes       22.7   % Monocytes       6.3   % Eosinophils       2.7   % Basophils       0.4   % Immature Granulocytes       0.2   Absolute Neutrophil      1.6 - 8.3 10e9/L 9.1 (H)   Absolute Lymphocytes      0.8 - 5.3 10e9/L 3.1   Absolute Monoctyes      0.0 - 1.3 10e9/L 0.9   Absolute Eosinophils      0.0 - 0.7 10e9/L 0.4   Absolute Basophils      0.0 - 0.2 10e9/L 0.1   Abs Immature Granulocytes      0 - 0.4 10e9/L 0.0   Creatinine      0.52 - 1.04 mg/dL 1.21 (H)   GFR Estimate      >60 mL/min/1.7m2 47 (L)   GFR Estimate If Black      >60 mL/min/1.7m2 57 (L)   Iron      35 - 180 ug/dL 70   Iron Binding Cap      240 - 430 ug/dL 428   Iron Saturation Index      15 - 46 % 16   Albumin      3.4 - 5.0 g/dL 4.6   ALT      0 - 50 U/L 29   AST      0 - 45 U/L 21   CRP Inflammation      0.0 - 8.0 mg/L <2.9   Sed Rate      0 - 20 mm/h 8   Vitamin D Deficiency screening      20 - 75 ug/L 46   Ferritin      8 - 252 ng/mL 18   TSH      0.40 - 4.00 mU/L 0.49   T4 Free      0.76 - 1.46 ng/dL 1.06   Free T3      2.3 - 4.2 pg/mL 2.8     Component      Latest Ref Rng 11/17/2015   Testosterone Total      8 - 60 ng/dL 19   Sex Hormone Binding Globulin      30 - 135 nmol/L 32   Free Testosterone Calculated      0.11 - 0.58 ng/dL 0.34   Vitamin A       0.61   Retinol Palmitate       <0.02 . . .   Vitamin A Interp       Normal . . .   Thyroglobulin Antibody      <40 IU/mL <20   Thyroid Peroxidase Antibody      <35 IU/mL 31   DHEA Sulfate      35 - 430 ug/dL 101   Zinc       68     Component      Latest Ref Rng 1/27/2016   Sodium      133 - 144 mmol/L 135    Potassium      3.4 - 5.3 mmol/L 4.0   Chloride      94 - 109 mmol/L 104   Carbon Dioxide      20 - 32 mmol/L 24   Anion Gap      3 - 14 mmol/L 7   Glucose      70 - 99 mg/dL 88   Urea Nitrogen      7 - 30 mg/dL 20   Creatinine      0.52 - 1.04 mg/dL 1.13 (H)   GFR Estimate      >60 mL/min/1.7m2 51 (L)   GFR Estimate If Black      >60 mL/min/1.7m2 62   Calcium      8.5 - 10.1 mg/dL 9.4   Bilirubin Total      0.2 - 1.3 mg/dL 0.7   Albumin      3.4 - 5.0 g/dL 4.3   Protein Total      6.8 - 8.8 g/dL 7.7   Alkaline Phosphatase      40 - 150 U/L 66   ALT      0 - 50 U/L 24   AST      0 - 45 U/L 18   Cholesterol      <200 mg/dL 112   Triglycerides      <150 mg/dL 100   HDL Cholesterol      >49 mg/dL 34 (L)   LDL Cholesterol Calculated      <100 mg/dL 58   Non HDL Cholesterol      <130 mg/dL 78   N-Terminal Pro Bnp      0 - 125 pg/mL 29   Hemoglobin A1C      4.3 - 6.0 % 7.0 (H)   Amylase      30 - 110 U/L 60   Lipase      73 - 393 U/L 394 (H)   Troponin I ES      0.000 - 0.045 ug/L <0.015 . . .     Component      Latest Ref Rng 2/24/2016   Angiotensin Converting Enzyme       <5 (L) . . .   Neutrophil Cytoplasmic IgG Antibody       <1:20 . . .   Sed Rate      0 - 20 mm/h 86 (H)     Copath Report      Patient Name: FAVIO MARTINEZ   MR#: [id number]   Specimen #: [id number]   Collected: 3/15/2016   Received: 3/15/2016   Reported: 3/17/2016 13:33   Ordering Phy(s): PARVEEN ENRIQUEZ     ORIGINAL REPORT FOLLOWS ADDENDUM  ADDENDUM     TO ORIGINAL REPORT   Status: Signed Out   Date Ordered:3/18/2016   Date Complete:3/18/2016   Date Reported:3/18/2016 12:06   Signed Out By: Marianne Godfrey MD     INTERPRETATION:   This addendum is done to incorporate the results of fungal (GMS) stains   done on the specimen.  Specimen is negative for fungal organisms.  The   original final diagnosis remains unchanged.     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  Negative for malignancy(Please   see microscopic description)     Electronically signed out by:     Marianne Godfrey MD     CLINICAL HISTORY:   right orbital mass     GROSS:   The specimen is received labeled \"right orbital biopsy\" and consists of   red-tan nodule measuring 1.5 x 0.9 x 0.6 cm with one smooth side and   opposite rough side consistent with periosteum.  The specimen is   bisected revealing homogenous pale tan fleshy cut surface.  Touch   preparations are prepared, air dried and fixed, portion of specimen is   submitted in RPMI for possible lymphoma workup [facility],   ([facility], [place], [place] ).  The remainder is entirely submitted.   (Dictated by: Marianne Godfrey MD 3/15/2016 04:45 PM)     MICROSCOPIC:     Specimen consists of portion of lacrimal gland with acute and chronic   inflammation.  Focal area of microabscess formation associated with   small areas of necrosis are also present.  Inflammation is seen   extending to the periorbital adipose tissue forming panniculitis.   Specimen is negative for malignancy.  Samples sent for immunophenotyping   to [facility], ([facility], [place], [place] ) reveals no evidence   of monoclonality or aberrant antigen expression.  A GMS (fungal) stain   is pending and results will be reported as an addendum.     CPT Codes:   A: 15416-ZY5, 69288-KOVZM, SOH, 93678-NUIIK, 75323-RJHB     TESTING LAB LOCATION:   [address]  [address] [phone]     COLLECTION SITE:   [facility]   [facility]            Component      Latest Ref Rng 4/29/2016   Nucleated RBCs      0 /100 0   Absolute Neutrophil      1.6 - 8.3 10e9/L 8.9 (H)   Absolute Lymphocytes      0.8 - 5.3 10e9/L 3.2   Absolute Monocytes      0.0 - 1.3 10e9/L 0.8 "   Absolute Eosinophils      0.0 - 0.7 10e9/L 0.2   Absolute Basophils      0.0 - 0.2 10e9/L 0.1   Abs Immature Granulocytes      0 - 0.4 10e9/L 0.1   Absolute Nucleated RBC       0.0   IGG      695 - 1620 mg/dL 836   IgG1      300 - 856 mg/dL 280 (L)   IgG2      158 - 761 mg/dL 277   IgG3      24 - 192 mg/dL 35   IgG4      11 - 86 mg/dL 16   RNP Antibody IgG      0.0 - 0.9 AI <0.2 . . .   Smith SUNNY Antibody IgG      0.0 - 0.9 AI <0.2 . . .   SSA (Ro) (SUNNY) Antibody, IgG      0.0 - 0.9 AI <0.2 . . .   SSB (La) (SUNNY) Antibody, IgG      0.0 - 0.9 AI <0.2 . . .   Scleroderma Antibody Scl-70 SUNNY IgG      0.0 - 0.9 AI <0.2 . . .   Cholesterol      <200 mg/dL 154   Triglycerides      <150 mg/dL 220 (H)   HDL Cholesterol      >49 mg/dL 42 (L)   LDL Cholesterol Calculated      <100 mg/dL 67   Non HDL Cholesterol      <130 mg/dL 111   M Tuberculosis Result      NEG Negative   M Tuberculosis Antigen Value       0.00   Albumin      3.4 - 5.0 g/dL 4.3   ALT      0 - 50 U/L 30   AST      0 - 45 U/L 10   Complement C3      76 - 169 mg/dL 157   Complement C4      15 - 50 mg/dL 32   CRP Inflammation      0.0 - 8.0 mg/L <2.9   Sed Rate      0 - 20 mm/h 2   DNA-ds      <10 IU/mL 1   Cyclic Citrullinated Peptide Antibody, IgG      <7 U/mL 1   Rheumatoid Factor      <20 IU/mL <20   Proteinase 3 Antibody IgG      0.0 - 0.9 AI <0.2 . . .   Myeloperoxidase Antibody IgG      0.0 - 0.9 AI 2.5 (H)   Vitamin D Deficiency screening      20 - 75 ug/L 24   Hemoglobin A1C      4.3 - 6.0 % 7.9 (H)   ALLI by IFA IgG       1:40 (A) . . .     Component      Latest Ref Rng & Units 4/7/2017   WBC      4.0 - 11.0 10e9/L 11.3 (H)   RBC Count      3.8 - 5.2 10e12/L 4.77   Hemoglobin      11.7 - 15.7 g/dL 12.5   Hematocrit      35.0 - 47.0 % 38.7   MCV      78 - 100 fl 81   MCH      26.5 - 33.0 pg 26.2 (L)   MCHC      31.5 - 36.5 g/dL 32.3   RDW      10.0 - 15.0 % 13.2   Platelet Count      150 - 450 10e9/L 347   Diff Method       Automated Method   %  Neutrophils      % 67.1   % Lymphocytes      % 22.9   % Monocytes      % 6.2   % Eosinophils      % 3.0   % Basophils      % 0.4   % Immature Granulocytes      % 0.4   Nucleated RBCs      0 /100 0   Absolute Neutrophil      1.6 - 8.3 10e9/L 7.5   Absolute Lymphocytes      0.8 - 5.3 10e9/L 2.6   Absolute Monocytes      0.0 - 1.3 10e9/L 0.7   Absolute Eosinophils      0.0 - 0.7 10e9/L 0.3   Absolute Basophils      0.0 - 0.2 10e9/L 0.1   Abs Immature Granulocytes      0 - 0.4 10e9/L 0.1   Absolute Nucleated RBC       0.0   IGG      695 - 1620 mg/dL 962   IgG1      300 - 856 mg/dL Test sent to [facility]. See ARMISC.   IgG2      158 - 761 mg/dL Test sent to [facility]. See ARMISC.   IgG3      24 - 192 mg/dL Test sent to [facility]. See ARMISC.   IgG4      11 - 86 mg/dL Test sent to [facility]. See ARMISC.   Result       SEE NOTE . . .   Test Name       IGG SUBCLASSES   Send Outs Misc Test Code       14393   Send Outs Misc Test Specimen       Serum   Creatinine      0.52 - 1.04 mg/dL 1.20 (H)   GFR Estimate      >60 mL/min/1.7m2 47 (L)   GFR Estimate If Black      >60 mL/min/1.7m2 57 (L)   Creatinine Urine      mg/dL    Albumin Urine mg/L      mg/L    Albumin Urine mg/g Cr      0 - 25 mg/g Cr    Myeloperoxidase Antibody IgG      0.0 - 0.9 AI 2.9 (H)   Proteinase 3 Antibody IgG      0.0 - 0.9 AI <0.2 . . .   Neutrophil Cytoplasmic IgG Antibody       1:80 (A) . . .   Angiotensin Converting Enzyme       <5 (L) . . .   Lab Scanned Result       TPMT GENOTYPE-Scanned   Vitamin C       56   ALT      0 - 50 U/L 23   Albumin      3.4 - 5.0 g/dL 4.3   AST      0 - 45 U/L 18   CRP Inflammation      0.0 - 8.0 mg/L 3.7   Sed Rate      0 - 30 mm/h 17   Vitamin D Deficiency screening      20 - 75 ug/L 40   Hemoglobin A1C      4.3 - 6.0 %      Component      Latest Ref Rng & Units 4/26/2017   WBC      4.0 - 11.0 10e9/L 11.8 (H)   RBC Count      3.8 - 5.2 10e12/L 4.23   Hemoglobin      11.7 - 15.7 g/dL 11.2 (L)   Hematocrit      35.0 - 47.0 % 35.0   MCV      78  - 100 fl 83   MCH      26.5 - 33.0 pg 26.5   MCHC      31.5 - 36.5 g/dL 32.0   RDW      10.0 - 15.0 % 13.4   Platelet Count      150 - 450 10e9/L 304   Diff Method       Automated Method   % Neutrophils      % 66.2   % Lymphocytes      % 22.7   % Monocytes      % 6.5   % Eosinophils      % 3.9   % Basophils      % 0.4   % Immature Granulocytes      % 0.3   Nucleated RBCs      0 /100 0   Absolute Neutrophil      1.6 - 8.3 10e9/L 7.8   Absolute Lymphocytes      0.8 - 5.3 10e9/L 2.7   Absolute Monocytes      0.0 - 1.3 10e9/L 0.8   Absolute Eosinophils      0.0 - 0.7 10e9/L 0.5   Absolute Basophils      0.0 - 0.2 10e9/L 0.1   Abs Immature Granulocytes      0 - 0.4 10e9/L 0.0   Absolute Nucleated RBC       0.0   IGG      695 - 1620 mg/dL    IgG1      300 - 856 mg/dL    IgG2      158 - 761 mg/dL    IgG3      24 - 192 mg/dL    IgG4      11 - 86 mg/dL    Result          Test Name          Send Outs Misc Test Code          Send Outs Misc Test Specimen          Creatinine      0.52 - 1.04 mg/dL 1.24 (H)   GFR Estimate      >60 mL/min/1.7m2 46 (L)   GFR Estimate If Black      >60 mL/min/1.7m2 55 (L)   Creatinine Urine      mg/dL 121   Albumin Urine mg/L      mg/L <5   Albumin Urine mg/g Cr      0 - 25 mg/g Cr Unable to calculate due to low value   Myeloperoxidase Antibody IgG      0.0 - 0.9 AI    Proteinase 3 Antibody IgG      0.0 - 0.9 AI    Neutrophil Cytoplasmic IgG Antibody          Angiotensin Converting Enzyme          Lab Scanned Result          Vitamin C          ALT      0 - 50 U/L 25   Albumin      3.4 - 5.0 g/dL 4.1   AST      0 - 45 U/L 15   CRP Inflammation      0.0 - 8.0 mg/L    Sed Rate      0 - 30 mm/h    Vitamin D Deficiency screening      20 - 75 ug/L    Hemoglobin A1C      4.3 - 6.0 % 7.8 (H)   EXAMINATION: CT CHEST ABDOMEN PELVIS W/O CONTRAST, 4/7/2017 2:59 PM     TECHNIQUE: Helical CT images from the thoracic inlet through the  symphysis pubis were obtained without IV contrast.     COMPARISON: CT chest on  5/20/2015. CT abdomen pelvis on 6/11/2014     HISTORY: Granuloma of right orbit. Concern for malignancy, lymphoma.     Additional history from the EMR: 50-year-old female with rheumatoid  arthritis and fibromyalgia. Presented on April 2016 with new right  orbital inflammatory mass, biopsied showed panniculitis without  infection or malignancy. Some intermittent swelling around her right  orbit.     FINDINGS:     Chest: Cardiac size is not enlarged. No pericardial effusion.  Calcified subcentimeter mediastinal and left hilar lymph nodes. No  thoracic adenopathy. Coronary artery calcifications/stents.  Benign-appearing coarse calcifications in the thyroid, better  described on ultrasound thyroid from 9/13/2016.     Central airways are patent. No pneumothorax or pleural effusion.  Calcified pulmonary granuloma in the posterior left upper lobe,  benign. Small focus of subpleural fibrosis and cysts along the  anterior lateral right lower lobe (image 96 series 6).     Abdomen and pelvis: Cholecystectomy. The liver, adrenal glands,  kidneys, spleen, pancreas, stomach, duodenum are without focal  abnormality on these noncontrast images.     No abdominal aortic aneurysm. No suspicious adenopathy in the abdomen  or pelvis. Bladder is intact. Calcified fibroid off the uterine  fundus. IUD in the uterus.     No focal bowel wall thickening or obstruction. No free air or free  fluid. Appendix is normal. Small periumbilical hernia.     Bones and soft tissues: No suspicious osseous lesions. Unremarkable  superficial soft tissues.         IMPRESSION: No evidence of malignancy in the chest, abdomen, or  pelvis.        I have personally reviewed the examination and initial interpretation  and I agree with the findings.     CONNIE BRICE      Component      Latest Ref Rng & Units 6/1/2017   WBC      4.0 - 11.0 10e9/L 12.0 (H)   RBC Count      3.8 - 5.2 10e12/L 5.18   Hemoglobin      11.7 - 15.7 g/dL 13.8   Hematocrit      35.0 - 47.0  % 41.0   MCV      78 - 100 fl 79   MCH      26.5 - 33.0 pg 26.6   MCHC      31.5 - 36.5 g/dL 33.7   RDW      10.0 - 15.0 % 14.8   Platelet Count      150 - 450 10e9/L 322   Diff Method       Automated Method   % Neutrophils      % 76.7   % Lymphocytes      % 16.5   % Monocytes      % 4.6   % Eosinophils      % 1.9   % Basophils      % 0.3   Absolute Neutrophil      1.6 - 8.3 10e9/L 9.2 (H)   Absolute Lymphocytes      0.8 - 5.3 10e9/L 2.0   Absolute Monocytes      0.0 - 1.3 10e9/L 0.6   Absolute Eosinophils      0.0 - 0.7 10e9/L 0.2   Absolute Basophils      0.0 - 0.2 10e9/L 0.0   Color Urine       Yellow   Appearance Urine       Clear   Glucose Urine      NEG mg/dL Negative   Bilirubin Urine      NEG Negative   Ketones Urine      NEG mg/dL Negative   Specific Gravity Urine      1.003 - 1.035 1.010   pH Urine      5.0 - 7.0 pH 5.5   Protein Albumin Urine      NEG mg/dL Negative   Urobilinogen Urine      0.2 - 1.0 EU/dL 0.2   Nitrite Urine      NEG Negative   Blood Urine      NEG Negative   Leukocyte Esterase Urine      NEG Negative   Source       Midstream Urine   WBC Urine      0 - 2 /HPF O - 2   RBC Urine      0 - 2 /HPF O - 2   Squamous Epithelial /LPF Urine      FEW /LPF Few   Bacteria Urine      NEG /HPF Few (A)   Creatinine      0.52 - 1.04 mg/dL 1.19 (H)   GFR Estimate      >60 mL/min/1.7m2 48 (L)   GFR Estimate If Black      >60 mL/min/1.7m2 58 (L)   Protein Random Urine      g/L <0.05   Protein Total Urine g/gr Creatinine      0 - 0.2 g/g Cr Unable to calculate due to low value   Myeloperoxidase Antibody IgG      0.0 - 0.9 AI 1.9 (H)   Proteinase 3 Antibody IgG      0.0 - 0.9 AI <0.2 . . .   ALT      0 - 50 U/L 29   AST      0 - 45 U/L 18   Albumin      3.4 - 5.0 g/dL 4.6   CRP Inflammation      0.0 - 8.0 mg/L 6.1   Sed Rate      0 - 30 mm/h 13   Creatinine Urine Random      mg/dL 54   Neutrophil Cytoplasmic IgG Antibody       <1:20 . . .   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      Comparison: MRI brain 5/29/2013      Technique:   Orbits: Axial and coronal T1-weighted, and coronal T2-weighted images  obtained without intravenous contrast. Post-intravenous contrast  (using gadolinium) sagittal FLAIR, were obtained with fat-saturation,  focused on the orbits.  Brain: Axial susceptibility-weighted and FLAIR sequences were obtained  of the whole brain without intravenous contrast, and postcontrast  axial T1-weighted images were obtained through the whole brain.   Contrast: 7.5mL Gadavist     Findings:  New asymmetric enlargement of the right lacrimal gland and enhancement  of the right lacrimal gland with surrounding ill-defined crescentic T2  hyperintense signal and enhancement within the right superior  superotemporal orbit, predominantly involving the extraconal space  with slight intraconal extension. No definite associated restricted  diffusion. No discrete extraocular muscle enlargement. Normal  symmetric optic nerve signal. Cavernous sinuses and orbital apices are  normal. Globes are normal.     Whole brain images are symmetric minimal leukoaraiosis and generalized  parenchymal volume loss. Ventricles are not enlarged. No intracranial  hemorrhage, mass effect, midline shift or abnormal extra axial fluid  collection. No abnormal foci of intracranial enhancement or restricted  diffusion. Paranasal sinuses and mastoid air cells are clear.            Impression:    New abnormal enlargement of the right lacrimal gland with surrounding  ill-defined T2 hyperintense signal and enhancement centered in the  superotemporal right orbital fat, which may represent   infectious/inflammatory dacryadenitis, orbital pseudotumor, lymphoma,  carcinoma, sarcoidosis or IgG4  disease..     I have personally reviewed the examination and initial interpretation  and I agree with the findings.     PATRICIA BRANTLEY MD      Component      Latest Ref Rng & Units 6/1/2017   WBC      4.0 - 11.0 10e9/L 12.0 (H)   RBC Count      3.8 - 5.2 10e12/L 5.18   Hemoglobin      11.7 - 15.7 g/dL 13.8   Hematocrit      35.0 - 47.0 % 41.0   MCV      78 - 100 fl 79   MCH      26.5 - 33.0 pg 26.6   MCHC      31.5 - 36.5 g/dL 33.7   RDW      10.0 - 15.0 % 14.8   Platelet Count      150 - 450 10e9/L 322   Diff Method       Automated Method   % Neutrophils      % 76.7   % Lymphocytes      % 16.5   % Monocytes      % 4.6   % Eosinophils      % 1.9   % Basophils      % 0.3   Absolute Neutrophil      1.6 - 8.3 10e9/L 9.2 (H)   Absolute Lymphocytes      0.8 - 5.3 10e9/L 2.0   Absolute Monocytes      0.0 - 1.3 10e9/L 0.6   Absolute Eosinophils      0.0 - 0.7 10e9/L 0.2   Absolute Basophils      0.0 - 0.2 10e9/L 0.0   Color Urine       Yellow   Appearance Urine       Clear   Glucose Urine      NEG mg/dL Negative   Bilirubin Urine      NEG Negative   Ketones Urine      NEG mg/dL Negative   Specific Gravity Urine      1.003 - 1.035 1.010   pH Urine      5.0 - 7.0 pH 5.5   Protein Albumin Urine      NEG mg/dL Negative   Urobilinogen Urine      0.2 - 1.0 EU/dL 0.2   Nitrite Urine      NEG Negative   Blood Urine      NEG Negative   Leukocyte Esterase Urine      NEG Negative   Source       Midstream Urine   WBC Urine      0 - 2 /HPF O - 2   RBC Urine      0 - 2 /HPF O - 2   Squamous Epithelial /LPF Urine      FEW /LPF Few   Bacteria Urine      NEG /HPF Few (A)   Creatinine      0.52 - 1.04 mg/dL 1.19 (H)   GFR Estimate      >60 mL/min/1.7m2 48 (L)   GFR Estimate If Black      >60 mL/min/1.7m2 58 (L)   Protein Random Urine      g/L <0.05   Protein Total Urine g/gr Creatinine      0 - 0.2 g/g Cr Unable to calculate due to low value   Myeloperoxidase Antibody IgG      0.0 - 0.9 AI 1.9 (H)   Proteinase 3 Antibody IgG      0.0 -  "0.9 AI <0.2 . . .   ALT      0 - 50 U/L 29   AST      0 - 45 U/L 18   Albumin      3.4 - 5.0 g/dL 4.6   CRP Inflammation      0.0 - 8.0 mg/L 6.1   Sed Rate      0 - 30 mm/h 13   Creatinine Urine Random      mg/dL 54   Neutrophil Cytoplasmic IgG Antibody       <1:20 . . .   Creatinine Urine      mg/dL 54       Patient Name: FAVIO MARTINEZ   MR#: [id number]   Specimen #: [id number]   Collected: 8/4/2017   Received: 8/4/2017   Reported: 8/9/2017 16:19   Ordering Phy(s): CRISTHIAN FLORES     For improved result formatting, select 'View Enhanced Report Format'   under Linked Documents section.     SPECIMEN(S):   Eye, right lacrimal gland     FINAL DIAGNOSIS:   Eye, right, \"lacrimal gland\", biopsy   - Dense fibrosis and patchy inflammation   - No residual lacrimal gland seen     COMMENT:   Sections show tissue involved by dense fibrosis and patchy inflammation.   There is no morphologic or immunohistochemical evidence of lymphoma. By   separate report, concurrent flow cytometry studies ([id number]),   performed at the [facility], show polytypic B cells and no   aberrant immunophenotype on T cells. Immunohistochemical stains for IgG   and IgG-4 were performed, which provide no support for IgG-4-related   disease. Some of these changes may be related to prior surgery. Sjögren   disease is not excluded. There is no evidence of malignancy. Dr. Max Conway has reviewed this case and concurs.     Electronically signed out by:     Antonella Beth M.D.       ROS:  A comprehensive ROS was done, positives are per HPI.        HISTORY REVIEW:  Past Medical History:   Diagnosis Date     Abnormal glandular Papanicolaou smear of cervix 1992     Allergic rhinitis     Allergy, airborne subst     Arthritis      ASCVD (arteriosclerotic cardiovascular disease)      Chronic pain      Chronic pancreatitis (H)     idiopathic, Tx: PPI, antioxidants, pancreatic enzymes     Common migraine      Coronary artery disease      " Costochondritis      Costochondritis      Difficulty in walking(719.7)      Dyspnea on exertion      Ectasia, mammary duct     [facility], persistent nipple discharge     Elevated fasting glucose      Gastro-oesophageal reflux disease      Hernia      History of angina      Hyperlipidemia LDL goal < 100      Hypertension goal BP (blood pressure) < 140/90     Essential hypertension     Iron deficiency anemia      Ischemic cardiomyopathy      Menorrhagia      Migraine headaches      Mild persistent asthma      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 Matias, neg w/u, Rx'd w/prednisone     Ventral hernia, unspecified, without mention of obstruction or gangrene 2012     Past Surgical History:   Procedure Laterality Date     C ECHO HEART XTHORACIC,COMPLETE, W/O DOPPLER  04    [facility] WNL, EF 60%     C/SECTION, LOW TRANSVERSE           CARDIAC SURGERY      cardiac stent- recent in 16; 4 other stents     DILATION AND CURETTAGE N/A 2016    Procedure: DILATION AND CURETTAGE;  Surgeon: Nahed Butler MD;  Location:  OR      UGI ENDOSCOPY W EUS  08    Dr. Pastrana, possible chronic pancreatitis, fatty liver     HERNIA REPAIR  2012    done at [facility]     INSERT INTRAUTERINE DEVICE N/A 2016    Procedure: INSERT  INTRAUTERINE DEVICE;  Surgeon: Nahed Butler MD;  Location: [facility] OR     INT UTERINE FIBRIOD EMBOLIZATION  10/29/2014     LAPAROSCOPIC CHOLECYSTECTOMY  08    Dr. Arnol GRUBBS TX, CERVICAL      s/p LEEP     ORBITOTOMY Right 3/15/2016    Procedure: ORBITOTOMY;  Surgeon: Myron Cyr MD;  Location:  [facility]     ORBITOTOMY Right 2017    Procedure: ORBITOTOMY;  RIGHT ORBITOTOMY AND BIOPSY;  Surgeon: Charis Holbrook MD;  Location:  [facility]     REPAIR PTOSIS Right 2017    Procedure: REPAIR PTOSIS;  RIGHT UPPER LID PTOSIS REPAIR;  Surgeon: Myron Cyr MD;  Location:  [facility]     UPPER GI ENDOSCOPY  10/21/08    mild gastritis, Dr. Hidalgo     Family History   Problem Relation Age of Onset     HEART DISEASE Father 50     heart attack     CEREBROVASCULAR DISEASE Father      DIABETES Father      Hypertension Father      Depression Father      C.A.D. Father      Hypertension Mother      Arthritis Mother      HEART DISEASE Mother      long qt syndrome     Thyroid Disease Mother      C.A.D. Mother      HEART DISEASE Brother 15     MI at 15, 16.      DIABETES Maternal Uncle      Hypertension Maternal Aunt      Hypertension Maternal Uncle      Arthritis Brother      he passed away, had severe arthritis at age 11     Arthritis Maternal Uncle      Eye Disorder Maternal Uncle      cataracts     Neurologic Disorder Sister      migraines     Neurologic Disorder Sister      migraines     Respiratory Son      asthma     CEREBROVASCULAR DISEASE Maternal Uncle      C.A.D. Brother      Family History Negative Other      neg for RA, SLE     Unknown/Adopted No family hx of      unknown neurological issues in her family, mother was adopted     Skin Cancer No family hx of      No known family hx of skin cancer     Social History     Social History     Marital status: Single     Spouse name: N/A     Number of children: 1     Years of education: N/A     Occupational History      None      Social History Main  Topics     Smoking status: Former Smoker     Packs/day: 0.20     Years: 1.00     Types: Cigarettes     Quit date: 2/1/2016     Smokeless tobacco: Never Used     Alcohol use No     Drug use: No     Sexual activity: Not Currently     Other Topics Concern     Parent/Sibling W/ Cabg, Mi Or Angioplasty Before 65f 55m? Yes     Social History Narrative    Balanced Diet - sometimes    Osteoporosis Prevention Measures - Dairy servings per day: 2 servings weekly    Regular Exercise -  Yes Describe walking 4 times a week    Dental Exam - NO    Seatbelts used - Yes    Self Breast Exam - Yes    Abuse: Current or Past (Physical, Sexual or Emotional)- No    Do you have any concerns about STD's -  No    Do you feel safe in your environment - No    Guns stored in the home - No    Sunscreen used - Yes    Lipids -  YES - Date: 053102    Glucose -  YES - Date: 012804    Eye Exam - YES - Date: one year ago    Colon Cancer Screening - No    Hemoccults - NO    Pap Test -  YES - Date: 070904, remote history of LEEP    Mammography - YES - Date: last spring, would like to discuss, needs a referral to [facility]    DEXA - NO    Immunizations reviewed and up to date - Yes, last td given in 1997 or 1998     Patient Active Problem List   Diagnosis     Seasonal affective disorder (H)     Allergic rhinitis     PCOS (polycystic ovarian syndrome)     Moderate persistent asthma     Chronic pancreatitis (H)     Hypertension goal BP (blood pressure) < 140/90     Common migraine     NSTEMI (non-ST elevated myocardial infarction) (H)     Hyperlipidemia LDL goal <70     ASCVD (arteriosclerotic cardiovascular disease)     GERD (gastroesophageal reflux disease)     Ischemic cardiomyopathy     Hypertensive heart disease     Uterine leiomyoma     Iron deficiency anemia     Costochondritis     Vitamin D deficiency     Breast cancer screening     Spinal stenosis in cervical region     Fibromyalgia     Seronegative rheumatoid arthritis (H)      Type 2 diabetes, HbA1C goal < 8% (H)     Type 2 diabetes mellitus with other specified complication (H)     Hyperlipidemia associated with type 2 diabetes mellitus (H)     Hypertension associated with diabetes (H)     Overweight with body mass index (BMI) 25.0-29.9     Tobacco use disorder     Telogen effluvium     CAD S/P percutaneous coronary angioplasty     Status post coronary angiogram       Pregnancy Hx: She is . All misscarriages were in first trimester. H/o OC use in the past. 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    PMHx, FHx, SHx were reviewed, unchanged.      Outpatient Encounter Prescriptions as of 2018   Medication Sig Dispense Refill     traMADol (ULTRAM) 50 MG tablet Take 1 tablet (50 mg) by mouth every 8 hours as needed for moderate pain 60 tablet 3     insulin glargine (LANTUS) 100 UNIT/ML injection Inject 40 Units Subcutaneous daily (with breakfast)       spironolactone (ALDACTONE) 50 MG tablet Take 1 tablet (50 mg) by mouth daily . Take additional 0.5 tablets by mouth once daily as needed for weight gain. 45 tablet 11     lisinopril-hydrochlorothiazide (PRINZIDE/ZESTORETIC) 20-25 MG per tablet Take 1 tablet by mouth daily 30 tablet 11     clopidogrel (PLAVIX) 75 MG tablet Take 1 tablet (75 mg) by mouth daily 30 tablet 11     nitroGLYcerin (NITROSTAT) 0.4 MG sublingual tablet Place 1 tablet (0.4 mg) under the tongue every 5 minutes as needed for chest pain 25 tablet 1     pravastatin (PRAVACHOL) 40 MG tablet Take 1 tablet (40 mg) by mouth daily 30 tablet 11     ketoconazole (NIZORAL) 2 % cream   3     metroNIDAZOLE (METROCREAM) 0.75 % cream CECI EXT TO FACE BID  2     folic acid (FOLVITE) 1 MG tablet TK 1 T PO D  2     azelastine (ASTELIN) 0.1 % spray U 2 SPRAYS IEN BID  0     SYMBICORT 80-4.5 MCG/ACT Inhaler INHALE 2 PUFFS PO BID RINSE AND EXPECTORATE AFTER  3     cyclobenzaprine (FLEXERIL) 10 MG tablet Take 1 tablet (10 mg) by mouth 2 times daily as needed for muscle  spasms 180 tablet 0     hydroxychloroquine (PLAQUENIL) 200 MG tablet Take 2 tablets (400 mg) by mouth daily 180 tablet 1     BASAGLAR 100 UNIT/ML injection Inject 40 Units Subcutaneous daily (Patient not taking: Reported on 12/20/2017) 12 mL 2     bacitracin ophthalmic ointment Apply to incision line three times daily. (Patient not taking: Reported on 12/20/2017) 3.5 g 0     COMPRESSION STOCKINGS 2 each daily (Patient not taking: Reported on 12/20/2017) 4 each 2     COMPRESSION STOCKINGS 2 each daily Apply one 10-15 mmHg compression stocking to each lower extgmierty during the day and remove before bedtime. (Patient not taking: Reported on 12/20/2017) 4 each 2     insulin pen needle (BD MARCK U/F) 32G X 4 MM USE DAILY OR AS DIRECTED 300 each 3     insulin glargine (BASAGLAR KWIKPEN) 100 UNIT/ML injection Inject 40 Units Subcutaneous daily (Patient not taking: Reported on 12/20/2017) 18 mL 3     ranitidine (ZANTAC) 150 MG tablet Take 1 tablet (150 mg) by mouth 2 times daily 60 tablet 2     lidocaine (XYLOCAINE) 5 % ointment Apply 1 g topically 2 times daily as needed for moderate pain (Patient not taking: Reported on 12/20/2017) 50 g 5     ferrous gluconate (FERGON) 324 (38 FE) MG tablet Take 1 tablet (324 mg) by mouth 2 times daily (Patient taking differently: Take 1 tablet by mouth daily ) 180 tablet 1     blood glucose monitoring (ONE TOUCH DELICA) lancets Use to test blood sugars 4 times daily or as directed. (Patient not taking: Reported on 12/20/2017) 4 Box 3     vitamin D (ERGOCALCIFEROL) 12622 UNIT capsule Take 1 capsule (50,000 Units) by mouth every 7 days Need a Vitamin D level in 2 months. (Patient taking differently: Take 50,000 Units by mouth every 7 days Need a Vitamin D level in 2 months.) 8 capsule 0     blood glucose monitoring (NO BRAND SPECIFIED) meter device kit Use to test blood sugar 4 X times daily or as directed. (Patient not taking: Reported on 12/20/2017) 1 kit 0     blood glucose monitoring  (NO BRAND SPECIFIED) test strip 1 strip by In Vitro route 4 times daily Test as directed. Please dispense three months and three months of refills. Thank you. (Patient not taking: Reported on 12/20/2017) 360 each 3     diphenhydrAMINE (BENADRYL) 25 MG capsule TK 1 TO 2 CS PO QHS  4     EPIPEN 2-RIKY 0.3 MG/0.3ML injection INJECT 0.3 MG INTO THE MUSCLE PRF ANAPHYALAXIS  0     AEROSPAN 80 MCG/ACT AERS INHALE 2 PUFFS PO BID.  RINSE MOUTH AFTERWARDS  4     Magnesium Oxide -Mg Supplement 250 MG TABS TK 1 T PO BID. REDUCE IF STOOLS LOOSEN  11     nystatin (MYCOSTATIN) 595767 UNITS TABS tablet TK 2 TS PO BID  0     albuterol (2.5 MG/3ML) 0.083% neb solution INL 1 VIAL VIA NEBULIZATION Q 4 TO 6 HOURS PRN  1     dicyclomine (BENTYL) 20 MG tablet Take 1 tablet (20 mg) by mouth 4 times daily as needed 60 tablet 5     sucralfate (CARAFATE) 1 GM tablet Take 1 tablet (1 g) by mouth 4 times daily as needed 120 tablet 3     fluocinolone (SYNALAR) 0.01 % solution Apply topically daily as needed       mometasone (NASONEX) 50 MCG/ACT spray Spray 2 sprays into both nostrils daily       Ondansetron HCl (ZOFRAN PO)        metFORMIN (GLUCOPHAGE-XR) 500 MG 24 hr tablet Take 2 tablets (1,000 mg) by mouth daily (with dinner) 60 tablet 11     montelukast (SINGULAIR) 10 MG tablet Take 1 tablet (10 mg) by mouth At Bedtime 30 tablet 1     acetaminophen (TYLENOL) 325 MG tablet Take 1-2 tablets (325-650 mg) by mouth every 6 hours as needed for pain (headache) 250 tablet 0     metroNIDAZOLE (NORITATE) 1 % cream Apply topically daily       desonide (DESOWEN) 0.05 % cream Apply topically 2 times daily       ketoconazole (NIZORAL) 2 % shampoo Apply topically daily as needed       fluocinonide (LIDEX) 0.05 % external solution Apply topically 2 times daily To areas of itch on the scalp as needed. 60 mL 11     albuterol (PROAIR HFA, PROVENTIL HFA, VENTOLIN HFA) 108 (90 BASE) MCG/ACT inhaler Inhale 2 puffs into the lungs every 6 hours as needed for  "shortness of breath / dyspnea or wheezing 3 Inhaler 1     calcium carbonate (TUMS) 500 MG chewable tablet Take 3-4 chew tab by mouth daily as needed.       fexofenadine (ALLEGRA) 180 MG tablet Take 1 tablet by mouth daily as needed. 90 tablet 3     olopatadine (PATANOL) 0.1 % ophthalmic solution Place 1 drop into both eyes 2 times daily as needed for allergies. 5 mL 12     No facility-administered encounter medications on file as of 2/16/2018.        Allergies   Allergen Reactions     Amoxicillin-Pot Clavulanate      Augmentin      Unknown possible hives and edema     Azithromycin      Diatrizoate Other (See Comments)     Pt wants this listed because she is allergic to shellfish      Imitrex [Sumatriptan]      Severe face/neck/chest tightness and flushing side effects      Penicillins Hives     Unknown      Pork Allergy      Stomach pain, cramping, diarrhea, itching, nausea and headaches     Shellfish Allergy Hives and Swelling     Sulfa Drugs Hives and Swelling     Zithromax [Azithromycin Dihydrate] Swelling     Unknown          Ph.E:    Vitals:    02/16/18 1632   BP: 118/75   Pulse: 91   Temp: 98.7  F (37.1  C)   TempSrc: Oral   Weight: 74.4 kg (164 lb)   Height: 1.6 m (5' 3\")           Constitutional: WD/WN. Pleasant. In no acute distress. Obese.  Eyes: Swelling around R eye improved since last visit, ptosis is improved. EOMI  HEENT: No oral ulcers or thrush. Normal salivary pool.  Neck: No cervical LAP, + thyromegaly  Chest: Clear to auscultation bilaterally  CV: RRR, no murmurs/ rubs or gallops. No edema, clubbing or cyanosis.   GI: Abdomen is soft and non tender.  MS: No synovitis or joint tenderness. Cool joints. No joint deformities. Full ROM of the joints. No nodules. +Fibromyalgia trigger points.  Skin: No livedo, periungual erythema, digital ulcers or nail changes. + alopecia with scales, facial malar erythema (looks like seborrhoic dermatitis).  Neuro: A&O x 3. Grossly non focal, muscular power 5/5 in " all ext  Psych: NL affect and mood    Assessment/ plan:    1-Seropositive non-erosive RA (arthritis, AM stiffness, high CRP, RF 26 but re-check neg 3/2015 on HCQ) Dx 5/2013, FMS, new pleuritic CP 3/20-15-5/2015 resolved on steroids. She is on  mg bid since 5/2014. She tolerates it well and it's helping some but not enough to control all her pain. Continues having flare ups of joint pain, swelling also has FMS. Joint sx are getting worse. Had high ESR/CRP in 3/2015 and new pleuritic CP between 3/2015-5/2015 which resolved after taking medrol dose pack prescribed 5/20/2015. Her pleuritic CP could be related to her RA. Then stopped tramadol as it blames it for recent episodes of CP.    In the past, MTX was recommended, she declined.    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 Etiology of mass unclear, but it's inflammatory and related to pt's underlying autoimmune disease (inflammatory arthritis, serositis). 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    Her work up showed borderline + ALLI and slightly elevated MPO. I told her prednisone is not good for her diabetes and weight gain. Recommended a trial of MTX as next step. Discussed risks on details. I called her neuro-ophthalmologist at last visit who agreed with trial of MTX (she suspects pseudo-sarcoidosis or sarcoid like disease but no granuloma and ACE not elevated).     Unfortunately despite all my efforts to explain risks vs benefits (more than risks) of MTX as steroid sparing gent, Janine declined to try MTX. I was very concerned about her R orbital inflammatory mass as there is high chance of flare up off steroids and steroid causes her weigh gain and uncontrolled diabetes. I even offered her other steroid sparing gents like  imuran. She declined that as well.    Her inflammation around R orbit has recurred now. On 4/7/2017, I spent quite amount of time discussing a trial of MTX. After discussing risks/benefits, she agreed to try it.     She is s/p Tx with MTX 10 mg po qwk 4/2017-5/2017. No benefits and more GI SEs, more hair loss and headaches since start of MTX. No benefits. I called and spoke with her neuro-ophthalmologist who recommended a second opinion from neuro-ophthalmology at the . I suspect her presentation to be ANCA vasculitis related but wanted to repeat imaging and get neuro-ophthalmology eval here. She was recommended to have repeat biopsy to r/o lymphoma.    In 5/2017, prescribed her prednisone 40-30-20-10 mg a day each for 3 days then stop (she declined higher dose and longer taper despite my concern for worsening swelling around orbit), Her R campos-orbital edema significantly improved. MTX was stopped in 5/2017 given side effects. Plan was to start imuran 50 mg po qd (NL TPMT); however we decided to hold off till she gets biopsy done.    Repeat R lacrimal gland biopsy in 8/2017 showed non specific inflammation, it ruled out lymphoma. Her R orbital swelling is improved but still present. After her biopsy I contacted her to schedule a f/u visit with me to discuss plan of care but she declined till today's visit.    She did not tolerate AZA because of GI SEs.    She continues to have R campos-orbital swelling, fatigue and joint pain (part of it is due to FMS). 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 Even with rare risk of PML  without confirmation of vasculitis, 2 biopsies have ruled out lymphoma and showed inflammation. This is definitely an inflammatory process and is steroid responsive, she had local kenalog inj in 8/2017 at the time of biopsy which has helped. Given her diabetes and long term SE of  prednisone, highly recommend steroid sparing agent to prevent progression/recurrence of R campos-orbital swelling. Since she did not tolerate MTX and AZA, recommend rituximab as next step.     I spent 30 minutes discussing test results, plan of care, rituximab SEs including rare risk of PML. She is not ready to try rituximab but is going to think about it.        PLAN:    Recommend rituximab 1 gram q2wk x 2. Labs today.    CT chest/abdomen/pelvis 4/2017 was neg for malignancy. 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 Repeat MPO was positive in 6/2017.    Continue accupuncture.     My impression is that her arthralgias are a combination of IA, fibromyalgia and chronic pain.  Stopped HCQ at last visit, shortly after resumed taking it as arthralgia recurred. Continue HCQ. Eye exam is updated.      2-Fad pads. She was seen by endocrinology and cushing was ruled out in 4/2014. Was advised to do f/u for enlarged thyroid/thyroid nodules.    3-Hair loss. F/U with dermatology    4-Chronic pain. F/U with pain clinic    5-Concerns of subclinical hyperthyroidism: TSH is barely minimal, chart review shows that those lowest levels are since April 2014. At last visit, discussed Endocrinology ref which pt wants to hold off in this visit.     6-Vit D deficiency. Was replaced with vit D 50, 000 units po qwk x 12 wk then 2000 units qd      PLAN: Follow up in May    MEDICATION CHANGES: as above      Orders Placed This Encounter   Procedures     ALT     Albumin level     CBC with platelets differential     Creatinine     CRP inflammation     Erythrocyte sedimentation rate auto     Routine UA with micro reflex to culture     Vitamin D Deficiency     SSA Ro SUNNY Antibody IgG     SSB La SUNNY Antibody IgG     AST     Antineutrophil cytoplasmic Marline IgG     Vasculitis panel     Hepatitis B surface antigen     Hepatitis B core antibody     Hepatitis C antibody     M Tuberculosis by Quantiferon Gold                HPI      ROS      Physical Exam              HPI      ROS      Physical Exam

## 2018-02-19 LAB
DEPRECATED CALCIDIOL+CALCIFEROL SERPL-MC: 41 UG/L (ref 20–75)
ENA SS-A IGG SER IA-ACNC: <0.2 AI (ref 0–0.9)
ENA SS-B IGG SER IA-ACNC: <0.2 AI (ref 0–0.9)
HBV CORE AB SERPL QL IA: NONREACTIVE
HBV SURFACE AG SERPL QL IA: NONREACTIVE
HCV AB SERPL QL IA: NONREACTIVE
M TB TUBERC IFN-G BLD QL: NEGATIVE
M TB TUBERC IFN-G/MITOGEN IGNF BLD: 0 IU/ML
MYELOPEROXIDASE AB SER-ACNC: 1.7 AI (ref 0–0.9)
PROTEINASE3 IGG SER-ACNC: <0.2 AI (ref 0–0.9)

## 2018-02-21 LAB — ANCA IGG TITR SER IF: NORMAL {TITER}

## 2018-02-28 DIAGNOSIS — E11.9 TYPE 2 DIABETES MELLITUS NOT AT GOAL (H): ICD-10-CM

## 2018-03-01 RX ORDER — METFORMIN HCL 500 MG
1000 TABLET, EXTENDED RELEASE 24 HR ORAL
Qty: 60 TABLET | Refills: 0 | Status: SHIPPED | OUTPATIENT
Start: 2018-03-01 | End: 2018-04-30

## 2018-03-01 NOTE — TELEPHONE ENCOUNTER
metFORMIN (GLUCOPHAGE-XR) 500 MG 24 hr tablet  Last Written Prescription Date:  8/25/16  Last Fill Quantity: 60,   # refills: 11  Last Office Visit :11/15/17  Future Office visit:  None

## 2018-03-12 ENCOUNTER — TELEPHONE (OUTPATIENT)
Dept: CARDIOLOGY | Facility: CLINIC | Age: 53
End: 2018-03-12

## 2018-03-12 NOTE — TELEPHONE ENCOUNTER
Pharmacy requesting to refill metoprolol er succinate 100 mg    Not on pt med list      [facility] - [address] [place] [address]

## 2018-03-19 ENCOUNTER — TELEPHONE (OUTPATIENT)
Dept: CARDIOLOGY | Facility: CLINIC | Age: 53
End: 2018-03-19

## 2018-03-19 DIAGNOSIS — I10 BENIGN ESSENTIAL HYPERTENSION: Primary | ICD-10-CM

## 2018-03-19 RX ORDER — METOPROLOL SUCCINATE 100 MG/1
100 TABLET, EXTENDED RELEASE ORAL DAILY
Qty: 30 TABLET | Refills: 11 | Status: SHIPPED | OUTPATIENT
Start: 2018-03-19 | End: 2018-12-28

## 2018-03-19 NOTE — TELEPHONE ENCOUNTER
Pharmacy [address] [address] is requesting a refill for metoprolol Succ 100 mg daily. Medication does not appear on the patients list.

## 2018-03-27 DIAGNOSIS — K29.70 GASTRITIS: ICD-10-CM

## 2018-04-25 DIAGNOSIS — M19.90 INFLAMMATORY ARTHRITIS: Primary | ICD-10-CM

## 2018-04-26 RX ORDER — FOLIC ACID 1 MG/1
TABLET ORAL
Qty: 90 TABLET | Refills: 3 | Status: SHIPPED | OUTPATIENT
Start: 2018-04-26 | End: 2019-05-12

## 2018-04-26 NOTE — TELEPHONE ENCOUNTER
folic acid (FOLVITE) 1 MG     Last Written Prescription Date:  10/23/17  Last Fill Quantity: UNK,   # refills: UNK  Last Office Visit : 2/16/18  Future Office visit:  5/4/18    Routing refill request to provider for review/approval because:  Medication is reported/historical

## 2018-04-30 DIAGNOSIS — D50.9 IDA (IRON DEFICIENCY ANEMIA): ICD-10-CM

## 2018-04-30 DIAGNOSIS — E11.9 TYPE 2 DIABETES MELLITUS NOT AT GOAL (H): ICD-10-CM

## 2018-05-01 RX ORDER — METFORMIN HCL 500 MG
1000 TABLET, EXTENDED RELEASE 24 HR ORAL
Qty: 180 TABLET | Refills: 1 | Status: SHIPPED | OUTPATIENT
Start: 2018-05-01 | End: 2018-10-31

## 2018-05-01 NOTE — TELEPHONE ENCOUNTER
ferrous gluconate (FERGON) 324 (38 FE) MG   Last Written Prescription Date:  2/1/17  Last Fill Quantity: 180,   # refills: 1  Last Office Visit : 11/15/17  Future Office visit:  NONE  Routing refill request to provider for review/approval because:  Drug not on the refill protocol

## 2018-05-02 RX ORDER — FERROUS GLUCONATE 324(38)MG
1 TABLET ORAL 2 TIMES DAILY
Qty: 180 TABLET | Refills: 1 | Status: SHIPPED | OUTPATIENT
Start: 2018-05-02 | End: 2018-11-28

## 2018-05-30 DIAGNOSIS — M19.90 INFLAMMATORY ARTHRITIS: ICD-10-CM

## 2018-05-30 NOTE — LETTER
Dear Janine Cornell,    Regular eye exams are required while taking hydroxychloroquine (Plaquenil). Eye exams should be completed by an eye specialist who is experienced in monitoring for hydroxychloroquine toxicity (a rare effect of the drug that can damage your eyes and vision). These may be yearly or as determined by your eye specialist. You will also need a baseline eye exam within the first year of starting hydroxychloroquine.        Although vision problems and loss of sight while taking hydroxychloroquine are very rare, notify your doctor immediately if you notice changes in your vision. The goal of screening is to detect toxicity before your vision is significantly or noticeably impacted. Failing to get proper screening exams puts you at risk of vision changes which may or may not be reversible.      Per the American Academy of Ophthalmology recommendations (2016), screening tests performed may include a 10-2 visual field test, spectral-domain optical coherence tomography (SD OCT), or other screening tests as determined by the eye specialist, including a multifocal electroretinogram (mfERG) or fundus auto-fluorescence (FAF).  We encourage you to bring this letter to your eye exam to discuss the exams that will be performed during your visit. Please request your eye clinic fax or mail a copy of your eye exam reports to our clinic indicating that testing was completed for hydroxychloroquine toxicity screening. The exam notes must specifically comment on the potential for hydroxychloroquine toxicity and outline recommended follow-up.  If you have questions about hydroxychloroquine or the information in this letter, please call the clinic at [phone] or talk to your provider at your next office visit.               1  Eye Specialist Letter  Dear Eye Specialist,  To ensure safe use of Plaquenil (hydroxychloroquine), we are requesting your assistance in providing the following information. Please return this  form to our clinic via mail or fax, or incorporate this information into your visit summary. Your note must indicate whether or not there is evidence of toxicity from Plaquenil use. For questions regarding this form, please call [phone].  Patient Name:   :             Date of Exam:    The following exams were completed during this visit in accordance with the American Academy of Ophthalmology recommendations (2016):  ? 10-2 automated visual field  ? Spectral-domain optical coherence tomography (SD OCT)  ? Multifocal electroretinogram (mfERG)  ? Fundus autofluorescence (FAF)  ? Other (please specify)  Please select from the following:  ? The findings from the above exams are not suggestive of toxicity from Plaquenil (hydroxychloroquine).  ? The findings from the above exams may suggest toxicity from Plaquenil (hydroxychloroquine).   Please provide additional guidance on whether or not the medication may be continued from your perspective:  Date of next recommended Plaquenil (hydroxychloroquine) screening eye exam:   ? 5 years  ? 1 year  ? 6 months  Other (please specify):   Eye Specialist Name (print):                                                                Date:  Eye Specialist Signature:

## 2018-05-30 NOTE — LETTER
Dear Janine Cornell,    Janine Cornell  [address] [facility] [id number]         Regular eye exams are required while taking hydroxychloroquine (Plaquenil). Eye exams should be completed by an eye specialist who is experienced in monitoring for hydroxychloroquine toxicity (a rare effect of the drug that can damage your eyes and vision).  These may be yearly or as determined by your eye specialist.     Although vision problems and loss of sight while taking hydroxychloroquine are very rare, notify your doctor immediately if you notice changes in your vision. The goal of screening is to detect toxicity before your vision is significantly or noticeably impacted. Failing to get proper screening exams puts you at risk of vision changes which may or may not be reversible.    Per the American Academy of Ophthalmology recommendations (2016), screening tests performed may include a 10-2 visual field test, spectral-domain optical coherence tomography (SD OCT), or other screening tests as determined by the eye specialist, including a multifocal electroretinogram (mfERG) or fundus auto-fluorescence (FAF).    We received a refill request from your pharmacy. A copy of your current eye exam was not found in your medical record. Your hydroxychloroquine prescription has been refilled with a limited supply pending confirmation you have had an eye exam to test for hydroxychloroquine toxicity.      We encourage you to bring this letter to your eye exam to discuss the exams that will be performed during your visit. Please request your eye clinic fax or mail a copy of your eye exam report to our clinic indicating that testing was completed for hydroxychloroquine toxicity screening. The exam notes must specifically comment on the potential for hydroxychloroquine toxicity and outline recommended follow-up.    If you have questions about hydroxychloroquine or the information in this letter, please call the clinic at [phone]  or talk to your provider at your next office visit.                        2    Eye Specialist Letter  Dear Eye Specialist,  To ensure safe use of Plaquenil (hydroxychloroquine), we are requesting your assistance in providing the following information. Please return this form to our clinic via mail or fax, or incorporate this information into your visit summary. Your note must indicate whether or not there is evidence of toxicity from Plaquenil use. For questions regarding this form, please call [phone].  Patient Name:   :             Date of Exam:    The following exams were completed during this visit in accordance with the American Academy of Ophthalmology recommendations (2016):  ? 10-2 automated visual field  ? Spectral-domain optical coherence tomography (SD OCT)  ? Multifocal electroretinogram (mfERG)  ? Fundus autofluorescence (FAF)  ? Other (please specify)  Please select from the following:  ? The findings from the above exams are not suggestive of toxicity from Plaquenil (hydroxychloroquine).  ? The findings from the above exams may suggest toxicity from Plaquenil (hydroxychloroquine).   Please provide additional guidance on whether or not the medication may be continued from your perspective:  Date of next recommended Plaquenil (hydroxychloroquine) screening eye exam:   ? 5 years  ? 1 year  ? 6 months  Other (please specify):   Eye Specialist Name (print):                                                                Date:  Eye Specialist Signature:

## 2018-05-30 NOTE — LETTER
Dear Janine Cornell,     Regular eye exams are required while taking hydroxychloroquine (Plaquenil). Eye exams should be completed by an eye specialist who is experienced in monitoring for hydroxychloroquine toxicity (a rare effect of the drug that can damage your eyes and vision).  These may be yearly or as determined by your eye specialist.     Although vision problems and loss of sight while taking hydroxychloroquine are very rare, notify your doctor immediately if you notice changes in your vision. The goal of screening is to detect toxicity before your vision is significantly or noticeably impacted. Failing to get proper screening exams puts you at risk of vision changes which may or may not be reversible.    Per the American Academy of Ophthalmology recommendations (2016), screening tests performed may include a 10-2 visual field test, spectral-domain optical coherence tomography (SD OCT), or other screening tests as determined by the eye specialist, including a multifocal electroretinogram (mfERG) or fundus auto-fluorescence (FAF).    We received a refill request from your pharmacy. A copy of your current eye exam was not found in your medical record. Your hydroxychloroquine prescription has been refilled with a limited supply pending confirmation you have had an eye exam to test for hydroxychloroquine toxicity.      We encourage you to bring this letter to your eye exam to discuss the exams that will be performed during your visit. Please request your eye clinic fax or mail a copy of your eye exam report to our clinic indicating that testing was completed for hydroxychloroquine toxicity screening. The exam notes must specifically comment on the potential for hydroxychloroquine toxicity and outline recommended follow-up.    If you have questions about hydroxychloroquine or the information in this letter, please call the clinic at [phone] or talk to your provider at your next office  visit.          Sincerely,    Majo Mckinnon MD    Division of Rheumatic and Autoimmune Diseases  [address] [place] [id number]                2    Eye Specialist Letter  Dear Eye Specialist,  To ensure safe use of Plaquenil (hydroxychloroquine), we are requesting your assistance in providing the following information. Please return this form to our clinic via mail or fax, or incorporate this information into your visit summary. Your note must indicate whether or not there is evidence of toxicity from Plaquenil use. For questions regarding this form, please call [phone].  Patient Name:   :             Date of Exam:    The following exams were completed during this visit in accordance with the American Academy of Ophthalmology recommendations (2016):  ? 10-2 automated visual field  ? Spectral-domain optical coherence tomography (SD OCT)  ? Multifocal electroretinogram (mfERG)  ? Fundus autofluorescence (FAF)  ? Other (please specify)  Please select from the following:  ? The findings from the above exams are not suggestive of toxicity from Plaquenil (hydroxychloroquine).  ? The findings from the above exams may suggest toxicity from Plaquenil (hydroxychloroquine).   Please provide additional guidance on whether or not the medication may be continued from your perspective:  Date of next recommended Plaquenil (hydroxychloroquine) screening eye exam:   ? 5 years  ? 1 year  ? 6 months  Other (please specify):   Eye Specialist Name (print):                                                                Date:  Eye Specialist Signature:

## 2018-05-31 RX ORDER — HYDROXYCHLOROQUINE SULFATE 200 MG/1
400 TABLET, FILM COATED ORAL DAILY
Qty: 180 TABLET | Refills: 0 | Status: SHIPPED | OUTPATIENT
Start: 2018-05-31 | End: 2019-01-03

## 2018-05-31 NOTE — TELEPHONE ENCOUNTER
I returned call to the pt. I advised her that she will need the Plaquenil Eye exam & that I mailed her the form. Pt states she has appt with Dr Loretta White at [facility]. Pt requested that I fax form to her eye doctor. I asked what location. Pt stated any location, she sees her doctor at both. I said I would fax to [facility] at [address], pt said that was ok.    I faxed to [phone] [facility] [address].    Jeanette Nina LPN.

## 2018-05-31 NOTE — TELEPHONE ENCOUNTER
hydroxychloroquine (PLAQUENIL) 200 MG tablet   Last Written Prescription Date:  10/26/17  Last Fill Quantity: 180,   # refills: 1  Last Office Visit : 2/16/18  Future Office visit:  9/7/18    Eye exam:  Past due  letter sent    Routing refill request to provider for review/approval because: eye exam past due

## 2018-06-15 ENCOUNTER — OFFICE VISIT (OUTPATIENT)
Dept: FAMILY MEDICINE | Facility: CLINIC | Age: 53
End: 2018-06-15
Payer: COMMERCIAL

## 2018-06-15 ENCOUNTER — TELEPHONE (OUTPATIENT)
Dept: FAMILY MEDICINE | Facility: CLINIC | Age: 53
End: 2018-06-15

## 2018-06-15 VITALS
TEMPERATURE: 98.8 F | HEART RATE: 76 BPM | WEIGHT: 165.5 LBS | OXYGEN SATURATION: 99 % | DIASTOLIC BLOOD PRESSURE: 82 MMHG | RESPIRATION RATE: 18 BRPM | BODY MASS INDEX: 29.32 KG/M2 | SYSTOLIC BLOOD PRESSURE: 119 MMHG

## 2018-06-15 DIAGNOSIS — Z79.4 TYPE 2 DIABETES MELLITUS WITHOUT COMPLICATION, WITH LONG-TERM CURRENT USE OF INSULIN (H): ICD-10-CM

## 2018-06-15 DIAGNOSIS — E11.9 TYPE 2 DIABETES MELLITUS WITHOUT COMPLICATION, WITH LONG-TERM CURRENT USE OF INSULIN (H): ICD-10-CM

## 2018-06-15 DIAGNOSIS — N30.01 ACUTE CYSTITIS WITH HEMATURIA: Primary | ICD-10-CM

## 2018-06-15 DIAGNOSIS — R30.0 DYSURIA: ICD-10-CM

## 2018-06-15 DIAGNOSIS — R82.90 NONSPECIFIC FINDING ON EXAMINATION OF URINE: ICD-10-CM

## 2018-06-15 LAB
ALBUMIN UR-MCNC: NEGATIVE MG/DL
APPEARANCE UR: ABNORMAL
BACTERIA #/AREA URNS HPF: ABNORMAL /HPF
BILIRUB UR QL STRIP: NEGATIVE
COLOR UR AUTO: YELLOW
GLUCOSE UR STRIP-MCNC: NEGATIVE MG/DL
HGB UR QL STRIP: ABNORMAL
KETONES UR STRIP-MCNC: NEGATIVE MG/DL
LEUKOCYTE ESTERASE UR QL STRIP: ABNORMAL
NITRATE UR QL: NEGATIVE
NON-SQ EPI CELLS #/AREA URNS LPF: ABNORMAL /LPF
PH UR STRIP: 7 PH (ref 5–7)
RBC #/AREA URNS AUTO: ABNORMAL /HPF
SOURCE: ABNORMAL
SP GR UR STRIP: 1.01 (ref 1–1.03)
UROBILINOGEN UR STRIP-ACNC: 0.2 EU/DL (ref 0.2–1)
WBC #/AREA URNS AUTO: ABNORMAL /HPF

## 2018-06-15 PROCEDURE — 81001 URINALYSIS AUTO W/SCOPE: CPT | Performed by: NURSE PRACTITIONER

## 2018-06-15 PROCEDURE — 87088 URINE BACTERIA CULTURE: CPT | Performed by: NURSE PRACTITIONER

## 2018-06-15 PROCEDURE — 99214 OFFICE O/P EST MOD 30 MIN: CPT | Performed by: NURSE PRACTITIONER

## 2018-06-15 PROCEDURE — 87186 SC STD MICRODIL/AGAR DIL: CPT | Performed by: NURSE PRACTITIONER

## 2018-06-15 PROCEDURE — 87086 URINE CULTURE/COLONY COUNT: CPT | Performed by: NURSE PRACTITIONER

## 2018-06-15 RX ORDER — CIPROFLOXACIN 250 MG/1
250 TABLET, FILM COATED ORAL 2 TIMES DAILY
Qty: 14 TABLET | Refills: 0 | Status: SHIPPED | OUTPATIENT
Start: 2018-06-15 | End: 2018-06-22

## 2018-06-15 NOTE — TELEPHONE ENCOUNTER
Patient was given a prescription for CIPRO for a UTI.  States she has several concerns after reading the patient information that came with the medication  Paperwork says not to take if you have joint pain - She has joint pain from RA   If you have a family history of long QT syndrome - she does  She takes oral diabetes meds  Has neuropathy and papers say medication can exacerbate this.     She did not know to ask any questions about these concerns until after she got the medication and is asking if she should be taking it.  Please advise.  Veronica Ramirez RN

## 2018-06-15 NOTE — PATIENT INSTRUCTIONS
1.  For urinary tract infection start antibiotic cipro 1 pill twice a day for 7 days.  Hold your calcium, magnesium, iron, and sucralfate while on the antibiotic (interferes with absorption).  I do not recommend the pyridium/azo with your history of kidney disease.  Lots of fluids.  Follow up if not improving in 2-3 days.  Culture pending.    2.  Recommend follow up with PCP to address diabetes

## 2018-06-15 NOTE — MR AVS SNAPSHOT
After Visit Summary   6/15/2018    Janine Cornell    MRN: [id number]           Patient Information     Date Of Birth          1965        Visit Information        Provider Department      6/15/2018 2:20 PM Katia Pastor APRN CNP [facility]        Today's Diagnoses     Acute cystitis with hematuria    -  1    Dysuria        Type 2 diabetes mellitus without complication, with long-term current use of insulin (H)          Care Instructions    1.  For urinary tract infection start antibiotic cipro 1 pill twice a day for 7 days.  Hold your calcium, magnesium, iron, and sucralfate while on the antibiotic (interferes with absorption).  I do not recommend the pyridium/azo with your history of kidney disease.  Lots of fluids.  Follow up if not improving in 2-3 days.  Culture pending.    2.  Recommend follow up with PCP to address diabetes          Follow-ups after your visit        Your next 10 appointments already scheduled     Jul 17, 2018 12:00 PM CDT   LAB with  LAB   [facility] ([facility])    [address] [place] [address] [phone]           Please do not eat 10-12 hours before your appointment if you are coming in fasting for labs on lipids, cholesterol, or glucose (sugar). This does not apply to pregnant women. Water, hot tea and black coffee (with nothing added) are okay. Do not drink other fluids, diet soda or chew gum.            Jul 18, 2018  2:30 PM CDT   Ech Complete with [id number]   [facility] ([facility] [place])    [address] [address] [phone]           1. Please bring or wear a comfortable two-piece outfit. 2. You may eat, drink and take your normal medicines. 3. For any questions that cannot be answered, please contact the ordering physician            Jul 18, 2018  3:30 PM CDT   (Arrive by 3:15 PM)   Return Visit with Milan Peace MD   [facility] ([facility])    [address]  [address]  [facility] [address] [phone]            Sep 07, 2018  3:00 PM CDT   (Arrive by 2:45 PM)   Return Visit with Majo Mckinnon MD   [facility] ([facility])    [address]  [address]  [facility] [address] [phone]              Who to contact     If you have questions or need follow up information about today's clinic visit or your schedule please contact [facility] directly at [phone].  Normal or non-critical lab and imaging results will be communicated to you by MyChart, letter or phone within 4 business days after the clinic has received the results. If you do not hear from us within 7 days, please contact the clinic through [employer] or phone. If you have a critical or abnormal lab result, we will notify you by phone as soon as possible.  Submit refill requests through [employer] or call your pharmacy and they will forward the refill request to us. Please allow 3 business days for your refill to be completed.          Additional Information About Your Visit        [employer] Information     [employer] gives you secure access to your electronic health record. If you see a primary care provider, you can also send messages to your care team and make appointments. If you have questions, please call your primary care clinic.  If you do not have a primary care provider, please call [phone] and they will assist you.        Care EveryWhere ID     This is your Care EveryWhere ID. This could be used by other organizations to access your [facility] medical records  [id number]        Your Vitals Were     Pulse Temperature Respirations Pulse Oximetry Breastfeeding? BMI (Body Mass Index)    76 98.8  F (37.1  C) (Oral) 18 99% No 29.32 kg/m2       Blood Pressure from Last 3 Encounters:   06/15/18 119/82   02/16/18 118/75   12/20/17 126/85    Weight from Last 3  Encounters:   06/15/18 165 lb 8 oz (75.1 kg)   02/16/18 164 lb (74.4 kg)   12/20/17 172 lb 8 oz (78.2 kg)              We Performed the Following     UA reflex to Microscopic and Culture     Urine Microscopic          Today's Medication Changes          These changes are accurate as of 6/15/18  2:54 PM.  If you have any questions, ask your nurse or doctor.               Start taking these medicines.        Dose/Directions    ciprofloxacin 250 MG tablet   Commonly known as:  CIPRO   Used for:  Acute cystitis with hematuria   Started by:  Katia Pastor APRN CNP        Dose:  250 mg   Take 1 tablet (250 mg) by mouth 2 times daily for 7 days   Quantity:  14 tablet   Refills:  0         These medicines have changed or have updated prescriptions.        Dose/Directions    vitamin D 89339 UNIT capsule   Commonly known as:  ERGOCALCIFEROL   This may have changed:  additional instructions   Used for:  Vitamin D deficiency        Dose:  88646 Units   Take 1 capsule (50,000 Units) by mouth every 7 days Need a Vitamin D level in 2 months.   Quantity:  8 capsule   Refills:  0            Where to get your medicines      These medications were sent to [facility] [place] [address]  [address], [facility] [address]     Phone:  [phone]     ciprofloxacin 250 MG tablet                Primary Care Provider    Lauren Francis RN       No address on file        Equal Access to Services     BESSY QUINN AH: Hadii frederick harkinso Sojunaid, waaxda luqadaha, qaybta kaalmada adeegyada, gagandeep vincent. So [facility] [phone].    ATENCIÓN: Si habla español, tiene a burch disposición servicios gratuitos de asistencia lingüística. Llame al [phone].    We comply with applicable federal civil rights laws and [place] laws. We do not discriminate on the basis of race, color, national origin, age, disability, sex, sexual orientation, or gender identity.             Thank you!     Thank you for choosing [facility]  for your care. Our goal is always to provide you with excellent care. Hearing back from our patients is one way we can continue to improve our services. Please take a few minutes to complete the written survey that you may receive in the mail after your visit with us. Thank you!             Your Updated Medication List - Protect others around you: Learn how to safely use, store and throw away your medicines at www.disposemymeds.org.          This list is accurate as of 6/15/18  2:54 PM.  Always use your most recent med list.                   Brand Name Dispense Instructions for use Diagnosis    acetaminophen 325 MG tablet    TYLENOL    250 tablet    Take 1-2 tablets (325-650 mg) by mouth every 6 hours as needed for pain (headache)    Other chronic pain, Headache(784.0)       AEROSPAN 80 MCG/ACT Aers oral inhaler   Generic drug:  flunisolide HFA      INHALE 2 PUFFS PO BID.  RINSE MOUTH AFTERWARDS        * albuterol 108 (90 Base) MCG/ACT Inhaler    PROAIR HFA/PROVENTIL HFA/VENTOLIN HFA    3 Inhaler    Inhale 2 puffs into the lungs every 6 hours as needed for shortness of breath / dyspnea or wheezing        * albuterol (2.5 MG/3ML) 0.083% neb solution      INL 1 VIAL VIA NEBULIZATION Q 4 TO 6 HOURS PRN        azelastine 0.1 % spray    ASTELIN     U 2 SPRAYS IEN BID        bacitracin ophthalmic ointment     3.5 g    Apply to incision line three times daily.    Postoperative eye state       * insulin glargine 100 UNIT/ML injection    LANTUS     Inject 40 Units Subcutaneous daily (with breakfast)        * BASAGLAR 100 UNIT/ML injection     18 mL    Inject 40 Units Subcutaneous daily    Type 2 diabetes mellitus without complication (H)       * BASAGLAR 100 UNIT/ML injection     12 mL    Inject 40 Units Subcutaneous daily    Type 2 diabetes mellitus without complication (H)       blood glucose monitoring lancets     4 Box    Use to test blood sugars 4 times  daily or as directed.    Type 2 diabetes, HbA1c goal < 7% (H)       blood glucose monitoring meter device kit    no brand specified    1 kit    Use to test blood sugar 4 X times daily or as directed.    Type 2 diabetes, HbA1c goal < 7% (H)       blood glucose monitoring test strip    no brand specified    360 each    1 strip by In Vitro route 4 times daily Test as directed. Please dispense three months and three months of refills. Thank you.    Type 2 diabetes, HbA1c goal < 7% (H)       ciprofloxacin 250 MG tablet    CIPRO    14 tablet    Take 1 tablet (250 mg) by mouth 2 times daily for 7 days    Acute cystitis with hematuria       clopidogrel 75 MG tablet    PLAVIX    30 tablet    Take 1 tablet (75 mg) by mouth daily        * COMPRESSION STOCKINGS     4 each    2 each daily    Bilateral lower extremity edema, Venous incompetence       * COMPRESSION STOCKINGS     4 each    2 each daily Apply one 10-15 mmHg compression stocking to each lower extgmierty during the day and remove before bedtime.    Venous incompetence, Bilateral lower extremity edema       cyclobenzaprine 10 MG tablet    FLEXERIL    180 tablet    Take 1 tablet (10 mg) by mouth 2 times daily as needed for muscle spasms    Fibromyalgia       desonide 0.05 % cream    DESOWEN     Apply topically 2 times daily        dicyclomine 20 MG tablet    BENTYL    60 tablet    Take 1 tablet (20 mg) by mouth 4 times daily as needed    Other irritable bowel syndrome       diphenhydrAMINE 25 MG capsule    BENADRYL     TK 1 TO 2 CS PO QHS        EPIPEN 2-RIKY 0.3 MG/0.3ML injection 2-pack   Generic drug:  EPINEPHrine      INJECT 0.3 MG INTO THE MUSCLE PRF ANAPHYALAXIS        ferrous gluconate 324 (38 Fe) MG tablet    FERGON    180 tablet    Take 1 tablet (324 mg) by mouth 2 times daily    AILEEN (iron deficiency anemia)       fexofenadine 180 MG tablet    ALLEGRA    90 tablet    Take 1 tablet by mouth daily as needed.    Chronic rhinitis       fluocinolone 0.01 % solution     SYNALAR     Apply topically daily as needed        fluocinonide 0.05 % solution    LIDEX    60 mL    Apply topically 2 times daily To areas of itch on the scalp as needed.    Telogen effluvium       folic acid 1 MG tablet    FOLVITE    90 tablet    Take 1 tablet by mouth daily    Inflammatory arthritis       hydroxychloroquine 200 MG tablet    PLAQUENIL    180 tablet    Take 2 tablets (400 mg) by mouth daily    Inflammatory arthritis       insulin pen needle 32G X 4 MM    BD MARCK U/F    300 each    USE DAILY OR AS DIRECTED    Type 2 diabetes, HbA1c goal < 7% (H)       * ketoconazole 2 % shampoo    NIZORAL     Apply topically daily as needed        * ketoconazole 2 % cream    NIZORAL          lidocaine 5 % ointment    XYLOCAINE    50 g    Apply 1 g topically 2 times daily as needed for moderate pain    Fibromyalgia, Rheumatoid arthritis involving both wrists with positive rheumatoid factor (H)       lisinopril-hydrochlorothiazide 20-25 MG per tablet    PRINZIDE/ZESTORETIC    30 tablet    Take 1 tablet by mouth daily    Hypertension, unspecified type       Magnesium Oxide -Mg Supplement 250 MG Tabs      TK 1 T PO BID. REDUCE IF STOOLS LOOSEN        metFORMIN 500 MG 24 hr tablet    GLUCOPHAGE-XR    180 tablet    Take 2 tablets (1,000 mg) by mouth daily (with dinner)    Type 2 diabetes mellitus not at goal (H)       metoprolol succinate 100 MG 24 hr tablet    TOPROL XL    30 tablet    Take 1 tablet (100 mg) by mouth daily    Benign essential hypertension       * metroNIDAZOLE 1 % cream    NORITATE     Apply topically daily        * metroNIDAZOLE 0.75 % cream    METROCREAM     CECI EXT TO FACE BID        mometasone 50 MCG/ACT spray    NASONEX     Spray 2 sprays into both nostrils daily        montelukast 10 MG tablet    SINGULAIR    30 tablet    Take 1 tablet (10 mg) by mouth At Bedtime    Uncomplicated asthma, unspecified asthma severity       nitroGLYcerin 0.4 MG sublingual tablet    NITROSTAT    25 tablet    Place 1  tablet (0.4 mg) under the tongue every 5 minutes as needed for chest pain    NSTEMI (non-ST elevated myocardial infarction) (H)       nystatin 216738 units Tabs tablet    MYCOSTATIN     TK 2 TS PO BID        olopatadine 0.1 % ophthalmic solution    PATANOL    5 mL    Place 1 drop into both eyes 2 times daily as needed for allergies.    Chronic rhinitis       pravastatin 40 MG tablet    PRAVACHOL    30 tablet    Take 1 tablet (40 mg) by mouth daily    Coronary artery disease involving native heart without angina pectoris, unspecified vessel or lesion type       ranitidine 150 MG tablet    ZANTAC    180 tablet    Take 1 tablet (150 mg) by mouth 2 times daily    Gastritis       spironolactone 50 MG tablet    ALDACTONE    45 tablet    Take 1 tablet (50 mg) by mouth daily . Take additional 0.5 tablets by mouth once daily as needed for weight gain.    Hypertension goal BP (blood pressure) < 140/90       sucralfate 1 GM tablet    CARAFATE    120 tablet    Take 1 tablet (1 g) by mouth 4 times daily as needed    Abdominal pain, epigastric       SYMBICORT 80-4.5 MCG/ACT Inhaler   Generic drug:  budesonide-formoterol      INHALE 2 PUFFS PO BID RINSE AND EXPECTORATE AFTER        traMADol 50 MG tablet    ULTRAM    60 tablet    Take 1 tablet (50 mg) by mouth every 8 hours as needed for moderate pain    Undifferentiated connective tissue disease (H)       TUMS 500 MG chewable tablet   Generic drug:  calcium carbonate      Take 3-4 chew tab by mouth daily as needed.        vitamin D 27501 UNIT capsule    ERGOCALCIFEROL    8 capsule    Take 1 capsule (50,000 Units) by mouth every 7 days Need a Vitamin D level in 2 months.    Vitamin D deficiency       ZOFRAN PO           * Notice:  This list has 11 medication(s) that are the same as other medications prescribed for you. Read the directions carefully, and ask your doctor or other care provider to review them with you.

## 2018-06-15 NOTE — PROGRESS NOTES
"  SUBJECTIVE:   Janine Cornell is a 53 year old female who presents to clinic today for the following health issues:      URINARY TRACT SYMPTOMS      Duration: Started yesterday morning with strong odor and pain with little production    Description  dysuria, frequency, urgency and odor    Intensity:  moderate    Accompanying signs and symptoms:  Fever/chills: no   Flank pain YES  Nausea and vomiting: no   Vaginal symptoms: none  Abdominal/Pelvic Pain: YES - pressure and \"spasms\" and \"needles poking\"    History  History of frequent UTI's: YES back in the 90's  History of kidney stones: no   Sexually Active: no   Possibility of pregnancy: No    Precipitating or alleviating factors: None    Therapies tried and outcome: increase fluid intake   Outcome: Unsure       Symptoms started yesterday morning, urine with strong odor.  When she voided felt like dynamite exploding, also had spasms and urgency.  Voiding small amounts.  No frequency.  Urine has been very dark.  No fever, she has chronic nausea.  No vaginal discharge.      Hx of DM2, insulin dependent, A1C 8.4% 11/2017.  Due for diabetes follow up.      CKD GFR 55 2/2018.    Patient Active Problem List   Diagnosis     Seasonal affective disorder (H)     Allergic rhinitis     PCOS (polycystic ovarian syndrome)     Moderate persistent asthma     Chronic pancreatitis (H)     Hypertension goal BP (blood pressure) < 140/90     Common migraine     NSTEMI (non-ST elevated myocardial infarction) (H)     Hyperlipidemia LDL goal <70     ASCVD (arteriosclerotic cardiovascular disease)     GERD (gastroesophageal reflux disease)     Ischemic cardiomyopathy     Hypertensive heart disease     Uterine leiomyoma     Iron deficiency anemia     Costochondritis     Vitamin D deficiency     Breast cancer screening     Spinal stenosis in cervical region     Fibromyalgia     Seronegative rheumatoid arthritis (H)     Type 2 diabetes, HbA1C goal < 8% (H)     Type 2 diabetes mellitus with " other specified complication (H)     Hyperlipidemia associated with type 2 diabetes mellitus (H)     Hypertension associated with diabetes (H)     Overweight with body mass index (BMI) 25.0-29.9     Tobacco use disorder     Telogen effluvium     CAD S/P percutaneous coronary angioplasty     Status post coronary angiogram     Past Surgical History:   Procedure Laterality Date     C ECHO HEART XTHORACIC,COMPLETE, W/O DOPPLER  04    [facility] WNL, EF 60%     C/SECTION, LOW TRANSVERSE           CARDIAC SURGERY      cardiac stent- recent in 16; 4 other stents     DILATION AND CURETTAGE N/A 2016    Procedure: DILATION AND CURETTAGE;  Surgeon: Nahed Butler MD;  Location: [facility]     HC UGI ENDOSCOPY W EUS  08    Dr. Pastrana, possible chronic pancreatitis, fatty liver     HERNIA REPAIR  2012    done at [facility]     INSERT INTRAUTERINE DEVICE N/A 2016    Procedure: INSERT INTRAUTERINE DEVICE;  Surgeon: Nahed Butler MD;  Location: [facility]     INT UTERINE FIBRIOD EMBOLIZATION  10/29/2014     LAPAROSCOPIC CHOLECYSTECTOMY  08    Dr. Arnol GRUBBS TX, CERVICAL      s/p LEEP     ORBITOTOMY Right 3/15/2016    Procedure: ORBITOTOMY;  Surgeon: Myron Cyr MD;  Location: [facility]     ORBITOTOMY Right 2017    Procedure: ORBITOTOMY;  RIGHT ORBITOTOMY AND BIOPSY;  Surgeon: Charis Holbrook MD;  Location: [facility]     REPAIR PTOSIS Right 2017    Procedure: REPAIR PTOSIS;  RIGHT UPPER LID PTOSIS REPAIR;  Surgeon: Myron Cyr MD;  Location: [facility]     UPPER GI ENDOSCOPY  10/21/08    mild gastritis, Dr. Hidalgo       Social History   Substance Use Topics     Smoking status: Former Smoker     Packs/day: 0.20     Years: 1.00     Types: Cigarettes     Quit date: 2016     Smokeless tobacco: Never Used     Alcohol use No     Family History   Problem Relation Age of Onset     HEART DISEASE Father 50     heart attack     CEREBROVASCULAR DISEASE Father      DIABETES  Father      Hypertension Father      Depression Father      C.A.D. Father      Hypertension Mother      Arthritis Mother      HEART DISEASE Mother      long qt syndrome     Thyroid Disease Mother      C.A.D. Mother      HEART DISEASE Brother 15     MI at 15, 16.      DIABETES Maternal Uncle      Hypertension Maternal Aunt      Hypertension Maternal Uncle      Arthritis Brother      he passed away, had severe arthritis at age 11     Arthritis Maternal Uncle      Eye Disorder Maternal Uncle      cataracts     Neurologic Disorder Sister      migraines     Neurologic Disorder Sister      migraines     Respiratory Son      asthma     CEREBROVASCULAR DISEASE Maternal Uncle      C.A.D. Brother      Family History Negative Other      neg for RA, SLE     Unknown/Adopted No family hx of      unknown neurological issues in her family, mother was adopted     Skin Cancer No family hx of      No known family hx of skin cancer         Current Outpatient Prescriptions   Medication Sig Dispense Refill     acetaminophen (TYLENOL) 325 MG tablet Take 1-2 tablets (325-650 mg) by mouth every 6 hours as needed for pain (headache) 250 tablet 0     AEROSPAN 80 MCG/ACT AERS INHALE 2 PUFFS PO BID.  RINSE MOUTH AFTERWARDS  4     albuterol (2.5 MG/3ML) 0.083% neb solution INL 1 VIAL VIA NEBULIZATION Q 4 TO 6 HOURS PRN  1     albuterol (PROAIR HFA, PROVENTIL HFA, VENTOLIN HFA) 108 (90 BASE) MCG/ACT inhaler Inhale 2 puffs into the lungs every 6 hours as needed for shortness of breath / dyspnea or wheezing 3 Inhaler 1     calcium carbonate (TUMS) 500 MG chewable tablet Take 3-4 chew tab by mouth daily as needed.       ciprofloxacin (CIPRO) 250 MG tablet Take 1 tablet (250 mg) by mouth 2 times daily for 7 days 14 tablet 0     clopidogrel (PLAVIX) 75 MG tablet Take 1 tablet (75 mg) by mouth daily 30 tablet 11     cyclobenzaprine (FLEXERIL) 10 MG tablet Take 1 tablet (10 mg) by mouth 2 times daily as needed for muscle spasms 180 tablet 0      desonide (DESOWEN) 0.05 % cream Apply topically 2 times daily       dicyclomine (BENTYL) 20 MG tablet Take 1 tablet (20 mg) by mouth 4 times daily as needed 60 tablet 5     diphenhydrAMINE (BENADRYL) 25 MG capsule TK 1 TO 2 CS PO QHS  4     EPIPEN 2-RIKY 0.3 MG/0.3ML injection INJECT 0.3 MG INTO THE MUSCLE PRF ANAPHYALAXIS  0     ferrous gluconate (FERGON) 324 (38 Fe) MG tablet Take 1 tablet (324 mg) by mouth 2 times daily 180 tablet 1     fexofenadine (ALLEGRA) 180 MG tablet Take 1 tablet by mouth daily as needed. 90 tablet 3     fluocinolone (SYNALAR) 0.01 % solution Apply topically daily as needed       fluocinonide (LIDEX) 0.05 % external solution Apply topically 2 times daily To areas of itch on the scalp as needed. 60 mL 11     folic acid (FOLVITE) 1 MG tablet Take 1 tablet by mouth daily 90 tablet 3     hydroxychloroquine (PLAQUENIL) 200 MG tablet Take 2 tablets (400 mg) by mouth daily 180 tablet 0     insulin glargine (BASAGLAR KWIKPEN) 100 UNIT/ML injection Inject 40 Units Subcutaneous daily 18 mL 3     insulin glargine (LANTUS) 100 UNIT/ML injection Inject 40 Units Subcutaneous daily (with breakfast)       insulin pen needle (BD MARCK U/F) 32G X 4 MM USE DAILY OR AS DIRECTED 300 each 3     ketoconazole (NIZORAL) 2 % cream   3     ketoconazole (NIZORAL) 2 % shampoo Apply topically daily as needed       lisinopril-hydrochlorothiazide (PRINZIDE/ZESTORETIC) 20-25 MG per tablet Take 1 tablet by mouth daily 30 tablet 11     Magnesium Oxide -Mg Supplement 250 MG TABS TK 1 T PO BID. REDUCE IF STOOLS LOOSEN  11     metFORMIN (GLUCOPHAGE-XR) 500 MG 24 hr tablet Take 2 tablets (1,000 mg) by mouth daily (with dinner) 180 tablet 1     metoprolol succinate (TOPROL-XL) 100 MG 24 hr tablet Take 1 tablet (100 mg) by mouth daily 30 tablet 11     metroNIDAZOLE (NORITATE) 1 % cream Apply topically daily       mometasone (NASONEX) 50 MCG/ACT spray Spray 2 sprays into both nostrils daily       montelukast (SINGULAIR) 10 MG tablet  Take 1 tablet (10 mg) by mouth At Bedtime 30 tablet 1     nitroGLYcerin (NITROSTAT) 0.4 MG sublingual tablet Place 1 tablet (0.4 mg) under the tongue every 5 minutes as needed for chest pain 25 tablet 1     nystatin (MYCOSTATIN) 788082 UNITS TABS tablet TK 2 TS PO BID  0     olopatadine (PATANOL) 0.1 % ophthalmic solution Place 1 drop into both eyes 2 times daily as needed for allergies. 5 mL 12     Ondansetron HCl (ZOFRAN PO)        pravastatin (PRAVACHOL) 40 MG tablet Take 1 tablet (40 mg) by mouth daily 30 tablet 11     ranitidine (ZANTAC) 150 MG tablet Take 1 tablet (150 mg) by mouth 2 times daily 180 tablet 1     spironolactone (ALDACTONE) 50 MG tablet Take 1 tablet (50 mg) by mouth daily . Take additional 0.5 tablets by mouth once daily as needed for weight gain. 45 tablet 11     sucralfate (CARAFATE) 1 GM tablet Take 1 tablet (1 g) by mouth 4 times daily as needed 120 tablet 3     traMADol (ULTRAM) 50 MG tablet Take 1 tablet (50 mg) by mouth every 8 hours as needed for moderate pain 60 tablet 3     azelastine (ASTELIN) 0.1 % spray U 2 SPRAYS IEN BID  0     bacitracin ophthalmic ointment Apply to incision line three times daily. (Patient not taking: Reported on 12/20/2017) 3.5 g 0     BASAGLAR 100 UNIT/ML injection Inject 40 Units Subcutaneous daily (Patient not taking: Reported on 12/20/2017) 12 mL 2     blood glucose monitoring (NO BRAND SPECIFIED) meter device kit Use to test blood sugar 4 X times daily or as directed. (Patient not taking: Reported on 12/20/2017) 1 kit 0     blood glucose monitoring (NO BRAND SPECIFIED) test strip 1 strip by In Vitro route 4 times daily Test as directed. Please dispense three months and three months of refills. Thank you. (Patient not taking: Reported on 12/20/2017) 360 each 3     blood glucose monitoring (ONE TOUCH DELICA) lancets Use to test blood sugars 4 times daily or as directed. (Patient not taking: Reported on 12/20/2017) 4 Box 3     COMPRESSION STOCKINGS 2 each  daily (Patient not taking: Reported on 12/20/2017) 4 each 2     COMPRESSION STOCKINGS 2 each daily Apply one 10-15 mmHg compression stocking to each lower extgmierty during the day and remove before bedtime. (Patient not taking: Reported on 12/20/2017) 4 each 2     lidocaine (XYLOCAINE) 5 % ointment Apply 1 g topically 2 times daily as needed for moderate pain (Patient not taking: Reported on 12/20/2017) 50 g 5     metroNIDAZOLE (METROCREAM) 0.75 % cream CECI EXT TO FACE BID  2     SYMBICORT 80-4.5 MCG/ACT Inhaler INHALE 2 PUFFS PO BID RINSE AND EXPECTORATE AFTER  3     vitamin D (ERGOCALCIFEROL) 84107 UNIT capsule Take 1 capsule (50,000 Units) by mouth every 7 days Need a Vitamin D level in 2 months. (Patient taking differently: Take 50,000 Units by mouth every 7 days Need a Vitamin D level in 2 months.) 8 capsule 0       ROS:  Const,  as above, otherwise negative       OBJECTIVE:                                                    /82 (BP Location: Right arm, Patient Position: Sitting, Cuff Size: Adult Regular)  Pulse 76  Temp 98.8  F (37.1  C) (Oral)  Resp 18  Wt 165 lb 8 oz (75.1 kg)  SpO2 99%  Breastfeeding? No  BMI 29.32 kg/m2   GENERAL APPEARANCE: healthy, alert and no distress  EYES: Eyes grossly normal to inspection and conjunctivae and sclerae normal  RESP: respirations nonlabored   ABDOMEN: soft, nontender, without hepatosplenomegaly or masses and bowel sounds normal.  Neg CVA tenderness bilaterally  PSYCH: mentation appears normal and affect normal/bright     Diagnostic test results:  Diagnostic Test Results:  Results for orders placed or performed in visit on 06/15/18 (from the past 24 hour(s))   UA reflex to Microscopic and Culture   Result Value Ref Range    Color Urine Yellow     Appearance Urine Cloudy     Glucose Urine Negative NEG^Negative mg/dL    Bilirubin Urine Negative NEG^Negative    Ketones Urine Negative NEG^Negative mg/dL    Specific Gravity Urine 1.010 1.003 - 1.035    Blood  Urine Large (A) NEG^Negative    pH Urine 7.0 5.0 - 7.0 pH    Protein Albumin Urine Negative NEG^Negative mg/dL    Urobilinogen Urine 0.2 0.2 - 1.0 EU/dL    Nitrite Urine Negative NEG^Negative    Leukocyte Esterase Urine Large (A) NEG^Negative    Source Midstream Urine    Urine Microscopic   Result Value Ref Range    WBC Urine  (A) OTO5^0 - 5 /HPF    RBC Urine 5-10 (A) OTO2^O - 2 /HPF    Bacteria Urine Moderate (A) NEG^Negative /HPF    Squamous Epithelial /LPF Urine Few FEW^Few /LPF     *Note: Due to a large number of results and/or encounters for the requested time period, some results have not been displayed. A complete set of results can be found in Results Review.     GFR Estimate   Date Value Ref Range Status   02/16/2018 55 (L) >60 mL/min/1.7m2 Final     Comment:     Non  GFR Calc   12/20/2017 61 >60 mL/min/1.7m2 Final     Comment:     Non  GFR Calc   11/15/2017 37 (L) >60 mL/min/1.7m2 Final     Comment:     Non  GFR Calc     GFR Estimate If Black   Date Value Ref Range Status   02/16/2018 66 >60 mL/min/1.7m2 Final     Comment:      GFR Calc   12/20/2017 74 >60 mL/min/1.7m2 Final     Comment:      GFR Calc   11/15/2017 45 (L) >60 mL/min/1.7m2 Final     Comment:      GFR Calc          ASSESSMENT/PLAN:                                                    (N30.00) Acute cystitis without hematuria  (primary encounter diagnosis)  Comment: in pt with hx uncontrolled diabetes, increased risk complicated infection.  Pt has bactrim allergy and CKD GFR 55, cannot use macrobid  Plan: start 7d course cipro 250mg twice a day.  Reviewed symptomatic cares, see pt instructions.  She requests Rx for pyridium, recommended against this with CKD hx.      (R30.0) Dysuria  Comment:   Plan: UA reflex to Microscopic and Culture            (E11.9,  Z79.4) Type 2 diabetes mellitus without complication, with long-term current use of  insulin (H)  Comment: impacting risk of above  Plan: discussed she is due for follow up, she will follow up with her PCP         See Patient Instructions    Katia Pastor, CNP  [facility]    Patient Instructions   1.  For urinary tract infection start antibiotic cipro 1 pill twice a day for 7 days.  Hold your calcium, magnesium, iron, and sucralfate while on the antibiotic (interferes with absorption).  I do not recommend the pyridium/azo with your history of kidney disease.  Lots of fluids.  Follow up if not improving in 2-3 days.  Culture pending.    2.  Recommend follow up with PCP to address diabetes

## 2018-06-15 NOTE — TELEPHONE ENCOUNTER
I still recommend the cipro for her.  She is on a low dose and will only be on it for 7 days so risks will be minimized.  I think it is more important to treat her UTI so it does not progress to a more serious infection.  We are limited on antibiotic options due to her allergies and kidney disease.  They have to list every potential side effect on package inserts and reading any package insert could cause concern, most patients do not get these side effects.  We are limited on abx options due to her kidney disease and drug allergies.    Katia Pastor, CNP

## 2018-06-15 NOTE — TELEPHONE ENCOUNTER
Left message on machine to call back.  Ask to speak to an RN, let them know it's a return call.  Leave a number and time that you can be reached.   Veronica Ramirez RN

## 2018-06-15 NOTE — TELEPHONE ENCOUNTER
Discussed with patient and reviewed info from Katia Pastor. All pt questions answered. She will take the cipro and call next week if concerns.    ISHA GrimaldoN, RN  [facility]

## 2018-06-17 LAB
BACTERIA SPEC CULT: ABNORMAL
SPECIMEN SOURCE: ABNORMAL

## 2018-06-18 NOTE — PROGRESS NOTES
Janine,    Your urine grew e coli and it is sensitive to the cipro antibiotic we started you on.    Katia Pastor, CNP

## 2018-06-20 ENCOUNTER — TRANSFERRED RECORDS (OUTPATIENT)
Dept: HEALTH INFORMATION MANAGEMENT | Facility: CLINIC | Age: 53
End: 2018-06-20

## 2018-07-18 ENCOUNTER — TELEPHONE (OUTPATIENT)
Dept: RHEUMATOLOGY | Facility: CLINIC | Age: 53
End: 2018-07-18

## 2018-07-18 ENCOUNTER — OFFICE VISIT (OUTPATIENT)
Dept: CARDIOLOGY | Facility: CLINIC | Age: 53
End: 2018-07-18
Attending: INTERNAL MEDICINE
Payer: COMMERCIAL

## 2018-07-18 ENCOUNTER — RADIANT APPOINTMENT (OUTPATIENT)
Dept: CARDIOLOGY | Facility: CLINIC | Age: 53
End: 2018-07-18
Payer: COMMERCIAL

## 2018-07-18 VITALS
SYSTOLIC BLOOD PRESSURE: 121 MMHG | HEIGHT: 63 IN | DIASTOLIC BLOOD PRESSURE: 79 MMHG | WEIGHT: 169.4 LBS | BODY MASS INDEX: 30.02 KG/M2 | HEART RATE: 72 BPM | OXYGEN SATURATION: 98 %

## 2018-07-18 DIAGNOSIS — M06.00 SERONEGATIVE RHEUMATOID ARTHRITIS (H): ICD-10-CM

## 2018-07-18 DIAGNOSIS — E11.69 HYPERLIPIDEMIA ASSOCIATED WITH TYPE 2 DIABETES MELLITUS (H): ICD-10-CM

## 2018-07-18 DIAGNOSIS — I11.9 HYPERTENSIVE HEART DISEASE WITHOUT HEART FAILURE: ICD-10-CM

## 2018-07-18 DIAGNOSIS — E10.8 TYPE 1 DIABETES MELLITUS WITH COMPLICATIONS (H): ICD-10-CM

## 2018-07-18 DIAGNOSIS — E78.5 HYPERLIPIDEMIA LDL GOAL <70: ICD-10-CM

## 2018-07-18 DIAGNOSIS — I25.10 CORONARY ARTERY DISEASE: ICD-10-CM

## 2018-07-18 DIAGNOSIS — I10 HYPERTENSION GOAL BP (BLOOD PRESSURE) < 140/90: ICD-10-CM

## 2018-07-18 DIAGNOSIS — I25.10 CAD S/P PERCUTANEOUS CORONARY ANGIOPLASTY: ICD-10-CM

## 2018-07-18 DIAGNOSIS — I25.5 ISCHEMIC CARDIOMYOPATHY: ICD-10-CM

## 2018-07-18 DIAGNOSIS — I21.4 NSTEMI (NON-ST ELEVATED MYOCARDIAL INFARCTION) (H): ICD-10-CM

## 2018-07-18 DIAGNOSIS — Z98.61 CAD S/P PERCUTANEOUS CORONARY ANGIOPLASTY: ICD-10-CM

## 2018-07-18 DIAGNOSIS — E78.5 HYPERLIPIDEMIA ASSOCIATED WITH TYPE 2 DIABETES MELLITUS (H): ICD-10-CM

## 2018-07-18 DIAGNOSIS — E78.5 HYPERLIPIDEMIA LDL GOAL <70: Primary | ICD-10-CM

## 2018-07-18 DIAGNOSIS — I25.10 ASCVD (ARTERIOSCLEROTIC CARDIOVASCULAR DISEASE): ICD-10-CM

## 2018-07-18 LAB
ALBUMIN SERPL-MCNC: 4.1 G/DL (ref 3.4–5)
ALP SERPL-CCNC: 78 U/L (ref 40–150)
ALT SERPL W P-5'-P-CCNC: 23 U/L (ref 0–50)
ANION GAP SERPL CALCULATED.3IONS-SCNC: 10 MMOL/L (ref 3–14)
AST SERPL W P-5'-P-CCNC: 17 U/L (ref 0–45)
BILIRUB SERPL-MCNC: 0.2 MG/DL (ref 0.2–1.3)
BUN SERPL-MCNC: 12 MG/DL (ref 7–30)
CALCIUM SERPL-MCNC: 9 MG/DL (ref 8.5–10.1)
CHLORIDE SERPL-SCNC: 105 MMOL/L (ref 94–109)
CHOLEST SERPL-MCNC: 124 MG/DL
CO2 SERPL-SCNC: 22 MMOL/L (ref 20–32)
CREAT SERPL-MCNC: 1.06 MG/DL (ref 0.52–1.04)
GFR SERPL CREATININE-BSD FRML MDRD: 54 ML/MIN/1.7M2
GLUCOSE SERPL-MCNC: 64 MG/DL (ref 70–99)
HDLC SERPL-MCNC: 35 MG/DL
LDLC SERPL CALC-MCNC: 62 MG/DL
NONHDLC SERPL-MCNC: 89 MG/DL
POTASSIUM SERPL-SCNC: 3.5 MMOL/L (ref 3.4–5.3)
PROT SERPL-MCNC: 8 G/DL (ref 6.8–8.8)
SODIUM SERPL-SCNC: 136 MMOL/L (ref 133–144)
TRIGL SERPL-MCNC: 134 MG/DL

## 2018-07-18 PROCEDURE — 80053 COMPREHEN METABOLIC PANEL: CPT | Performed by: INTERNAL MEDICINE

## 2018-07-18 PROCEDURE — 80061 LIPID PANEL: CPT | Performed by: INTERNAL MEDICINE

## 2018-07-18 PROCEDURE — 99214 OFFICE O/P EST MOD 30 MIN: CPT | Mod: ZP | Performed by: INTERNAL MEDICINE

## 2018-07-18 PROCEDURE — G0463 HOSPITAL OUTPT CLINIC VISIT: HCPCS | Mod: 25,ZF

## 2018-07-18 PROCEDURE — 36415 COLL VENOUS BLD VENIPUNCTURE: CPT | Performed by: INTERNAL MEDICINE

## 2018-07-18 RX ORDER — MULTIVITAMIN WITH FOLIC ACID 400 MCG
1 TABLET ORAL DAILY
Refills: 0 | COMMUNITY
Start: 2018-06-28 | End: 2021-05-10

## 2018-07-18 RX ORDER — ASPIRIN 81 MG/1
81 TABLET, COATED ORAL DAILY
Refills: 0 | COMMUNITY
Start: 2018-07-01 | End: 2018-09-04

## 2018-07-18 ASSESSMENT — PAIN SCALES - GENERAL: PAINLEVEL: NO PAIN (0)

## 2018-07-18 NOTE — TELEPHONE ENCOUNTER
Pt brought Eye Specialist Letter from her appointment on 6/20/18. Dr Loretta White indicated that a 10-2 automated visual field was done and that the exam was not suggestive of toxicity from plaquenil. Pt is to follow up in 1 year.    Result letter scanned. Flow sheet completed.    Saulo Johnson, BSN RN  Rheumatology RN Coordinator  [facility]

## 2018-07-18 NOTE — PATIENT INSTRUCTIONS
Patient Instructions:  It was a pleasure to see you in the cardiology clinic today.      If you have any questions, you can reach my nurse, Selena Underwood LPN, at [phone].  Press Option #1 for the [facility], and then press Option #3 for nursing.    We are encouraging the use of [employer] to communicate with your HealthCare Provider    Medication Changes: None.    Studies Ordered: Echocardiogram in one year.    The results from today include: Echocardiogram and labs.    Please follow up: With Dr. Peace with fasting labs prior.    Sincerely,    Milan Peace MD     If you have an urgent need after hours (8:00 am to 4:30 pm) please call [phone] and ask for the cardiology fellow on call.

## 2018-07-18 NOTE — LETTER
7/18/2018      RE: Janine Cornell  [address] [facility] [address]       Dear Colleague,    Thank you for the opportunity to participate in the care of your patient, Janine Cornell, at the [facility] HEART [facility] at [facility]. Please see a copy of my visit note below.    HPI:       Ms Janine Cornell, who is a 53 yr -American patient with DM2, Hypothyroidism, PCOS, Dyslipidemia, HTN, and S/PNSTEMI (11/2011) s/p stent placement comes for follow-up.      Patient has a positive RA factor and fibromyalgia.  Since a few weeks patient experiences suddenly welling in the face, in the orbita and and also in the arms.  Patient showed me some images of this swelling on her cell phone.  Sometimes it is accompanied with paresthesias.  She cannot mention if it is related with food intake, or medication intake.  She underwent in [facility] a CT scan of her head and sinuses - a few days days ago and she will see her physician soon who ordered.            PAST MEDICAL HISTORY:  Past Medical History:   Diagnosis Date     Abnormal glandular Papanicolaou smear of cervix 1992     Allergic rhinitis     Allergy, airborne subst     Arthritis      ASCVD (arteriosclerotic cardiovascular disease)      Chronic pain      Chronic pancreatitis (H)     idiopathic, Tx: PPI, antioxidants, pancreatic enzymes     Common migraine      Coronary artery disease      Costochondritis      Costochondritis      Difficulty in walking(719.7)      Dyspnea on exertion      Ectasia, mammary duct     [facility], persistent nipple discharge     Elevated fasting glucose      Gastro-oesophageal reflux disease      Hernia      History of angina      Hyperlipidemia LDL goal < 100      Hypertension goal BP (blood pressure) < 140/90     Essential hypertension     Iron deficiency anemia      Ischemic cardiomyopathy      Menorrhagia      Migraine headaches      Mild persistent asthma      Neuritis optic 1997     subacute autoimmune retrobulbar neuritis, Dr. 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 Matias, neg w/u, Rx'd w/prednisone     Ventral hernia, unspecified, without mention of obstruction or gangrene 7/2012       CURRENT MEDICATIONS:  Current Outpatient Prescriptions   Medication Sig Dispense Refill     acetaminophen (TYLENOL) 325 MG tablet Take 1-2 tablets (325-650 mg) by mouth every 6 hours as needed for pain (headache) 250 tablet 0     AEROSPAN 80 MCG/ACT AERS INHALE 2 PUFFS PO BID.  RINSE MOUTH AFTERWARDS  4     albuterol (2.5 MG/3ML) 0.083% neb solution INL 1 VIAL VIA NEBULIZATION Q 4 TO 6 HOURS PRN  1     albuterol (PROAIR HFA, PROVENTIL HFA, VENTOLIN HFA) 108 (90 BASE) MCG/ACT inhaler Inhale 2 puffs into the lungs every 6 hours as needed for shortness of breath / dyspnea or wheezing 3 Inhaler 1     ASPIRIN LOW DOSE 81 MG EC tablet Take 81 mg by mouth daily  0     calcium carbonate (TUMS) 500 MG chewable tablet Take 3-4 chew tab by mouth daily as needed.       clopidogrel (PLAVIX) 75 MG tablet Take 1 tablet (75 mg) by mouth daily 30 tablet 11     cyclobenzaprine (FLEXERIL) 10 MG tablet Take 1 tablet (10 mg) by mouth 2 times daily as needed for muscle spasms 180 tablet 0     desonide (DESOWEN) 0.05 % cream Apply topically 2 times daily       dicyclomine (BENTYL) 20 MG tablet Take 1 tablet (20 mg) by mouth 4 times daily as needed 60  tablet 5     diphenhydrAMINE (BENADRYL) 25 MG capsule TK 1 TO 2 CS PO QHS  4     EPIPEN 2-RIKY 0.3 MG/0.3ML injection INJECT 0.3 MG INTO THE MUSCLE PRF ANAPHYALAXIS  0     ferrous gluconate (FERGON) 324 (38 Fe) MG tablet Take 1 tablet (324 mg) by mouth 2 times daily 180 tablet 1     fexofenadine (ALLEGRA) 180 MG tablet Take 1 tablet by mouth daily as needed. 90 tablet 3     fluocinolone (SYNALAR) 0.01 % solution Apply topically daily as needed       fluocinonide (LIDEX) 0.05 % external solution Apply topically 2 times daily To areas of itch on the scalp as needed. 60 mL 11     folic acid (FOLVITE) 1 MG tablet Take 1 tablet by mouth daily 90 tablet 3     hydroxychloroquine (PLAQUENIL) 200 MG tablet Take 2 tablets (400 mg) by mouth daily 180 tablet 0     insulin glargine (LANTUS) 100 UNIT/ML injection Inject 40 Units Subcutaneous daily (with breakfast)       insulin pen needle (BD MARCK U/F) 32G X 4 MM USE DAILY OR AS DIRECTED 300 each 3     ketoconazole (NIZORAL) 2 % cream Apply topically as needed   3     ketoconazole (NIZORAL) 2 % shampoo Apply topically daily as needed       lisinopril-hydrochlorothiazide (PRINZIDE/ZESTORETIC) 20-25 MG per tablet Take 1 tablet by mouth daily 30 tablet 11     Magnesium Oxide -Mg Supplement 250 MG TABS TK 1 T PO BID. REDUCE IF STOOLS LOOSEN  11     metFORMIN (GLUCOPHAGE-XR) 500 MG 24 hr tablet Take 2 tablets (1,000 mg) by mouth daily (with dinner) 180 tablet 1     metoprolol succinate (TOPROL-XL) 100 MG 24 hr tablet Take 1 tablet (100 mg) by mouth daily 30 tablet 11     metroNIDAZOLE (NORITATE) 1 % cream Apply topically daily       mometasone (NASONEX) 50 MCG/ACT spray Spray 2 sprays into both nostrils daily       montelukast (SINGULAIR) 10 MG tablet Take 1 tablet (10 mg) by mouth At Bedtime 30 tablet 1     Multiple Vitamin (DAILY-GERALD) TABS Take 1 tablet by mouth daily  0     nitroGLYcerin (NITROSTAT) 0.4 MG sublingual tablet Place 1 tablet (0.4 mg) under the tongue every 5 minutes  as needed for chest pain 25 tablet 1     nystatin (MYCOSTATIN) 791153 UNITS TABS tablet TK 2 TS PO BID  0     olopatadine (PATANOL) 0.1 % ophthalmic solution Place 1 drop into both eyes 2 times daily as needed for allergies. 5 mL 12     Ondansetron HCl (ZOFRAN PO)        pravastatin (PRAVACHOL) 40 MG tablet Take 1 tablet (40 mg) by mouth daily 30 tablet 11     ranitidine (ZANTAC) 150 MG tablet Take 1 tablet (150 mg) by mouth 2 times daily 180 tablet 1     spironolactone (ALDACTONE) 50 MG tablet Take 1 tablet (50 mg) by mouth daily . Take additional 0.5 tablets by mouth once daily as needed for weight gain. 45 tablet 11     sucralfate (CARAFATE) 1 GM tablet Take 1 tablet (1 g) by mouth 4 times daily as needed 120 tablet 3     traMADol (ULTRAM) 50 MG tablet Take 1 tablet (50 mg) by mouth every 8 hours as needed for moderate pain 60 tablet 3     vitamin D (ERGOCALCIFEROL) 28990 UNIT capsule Take 1 capsule (50,000 Units) by mouth every 7 days Need a Vitamin D level in 2 months. (Patient taking differently: Take 50,000 Units by mouth every 7 days Need a Vitamin D level in 2 months.) 8 capsule 0     azelastine (ASTELIN) 0.1 % spray U 2 SPRAYS IEN BID  0     bacitracin ophthalmic ointment Apply to incision line three times daily. (Patient not taking: Reported on 12/20/2017) 3.5 g 0     BASAGLAR 100 UNIT/ML injection Inject 40 Units Subcutaneous daily (Patient not taking: Reported on 12/20/2017) 12 mL 2     blood glucose monitoring (NO BRAND SPECIFIED) meter device kit Use to test blood sugar 4 X times daily or as directed. (Patient not taking: Reported on 12/20/2017) 1 kit 0     blood glucose monitoring (NO BRAND SPECIFIED) test strip 1 strip by In Vitro route 4 times daily Test as directed. Please dispense three months and three months of refills. Thank you. (Patient not taking: Reported on 12/20/2017) 360 each 3     blood glucose monitoring (ONE TOUCH DELICA) lancets Use to test blood sugars 4 times daily or as directed.  (Patient not taking: Reported on 2017) 4 Box 3     COMPRESSION STOCKINGS 2 each daily (Patient not taking: Reported on 2017) 4 each 2     COMPRESSION STOCKINGS 2 each daily Apply one 10-15 mmHg compression stocking to each lower extgmierty during the day and remove before bedtime. (Patient not taking: Reported on 2017) 4 each 2     lidocaine (XYLOCAINE) 5 % ointment Apply 1 g topically 2 times daily as needed for moderate pain (Patient not taking: Reported on 2017) 50 g 5     SYMBICORT 80-4.5 MCG/ACT Inhaler INHALE 2 PUFFS PO BID RINSE AND EXPECTORATE AFTER  3       PAST SURGICAL HISTORY:  Past Surgical History:   Procedure Laterality Date     C ECHO HEART XTHORACIC,COMPLETE, W/O DOPPLER  04    [facility] WNL, EF 60%     C/SECTION, LOW TRANSVERSE           CARDIAC SURGERY      cardiac stent- recent in 16; 4 other stents     DILATION AND CURETTAGE N/A 2016    Procedure: DILATION AND CURETTAGE;  Surgeon: Nahed Butler MD;  Location: [facility] OR     HC UGI ENDOSCOPY W EUS  08    Dr. Pastrana, possible chronic pancreatitis, fatty liver     HERNIA REPAIR  2012    done at [facility]     INSERT INTRAUTERINE DEVICE N/A 2016    Procedure: INSERT INTRAUTERINE DEVICE;  Surgeon: Nahed Butler MD;  Location: [facility] OR     INT UTERINE FIBRIOD EMBOLIZATION  10/29/2014     LAPAROSCOPIC CHOLECYSTECTOMY  08    Dr. Arnol GRUBBS TX, CERVICAL      s/p LEEP     ORBITOTOMY Right 3/15/2016    Procedure: ORBITOTOMY;  Surgeon: Myron Cyr MD;  Location: [facility]     ORBITOTOMY Right 2017    Procedure: ORBITOTOMY;  RIGHT ORBITOTOMY AND BIOPSY;  Surgeon: Charis Holbrook MD;  Location: [facility]     REPAIR PTOSIS Right 2017    Procedure: REPAIR PTOSIS;  RIGHT UPPER LID PTOSIS REPAIR;  Surgeon: Myron Cyr MD;  Location: [facility]     UPPER GI ENDOSCOPY  10/21/08    mild gastritis, Dr. Hidalgo       ALLERGIES     Allergies   Allergen Reactions      Amoxicillin-Pot Clavulanate      Augmentin      Unknown possible hives and edema     Azithromycin      Diatrizoate Other (See Comments)     Pt wants this listed because she is allergic to shellfish      Imitrex [Sumatriptan]      Severe face/neck/chest tightness and flushing side effects      Penicillins Hives     Unknown      Pork Allergy      Stomach pain, cramping, diarrhea, itching, nausea and headaches     Shellfish Allergy Hives and Swelling     Sulfa Drugs Hives and Swelling     Zithromax [Azithromycin Dihydrate] Swelling     Unknown        FAMILY HISTORY:  Family History   Problem Relation Age of Onset     HEART DISEASE Father 50     heart attack     Cerebrovascular Disease Father      Diabetes Father      Hypertension Father      Depression Father      C.A.D. Father      Hypertension Mother      Arthritis Mother      HEART DISEASE Mother      long qt syndrome     Thyroid Disease Mother      C.A.D. Mother      HEART DISEASE Brother 15     MI at 15, 16.      Diabetes Maternal Uncle      Hypertension Maternal Aunt      Hypertension Maternal Uncle      Arthritis Brother      he passed away, had severe arthritis at age 11     Arthritis Maternal Uncle      Eye Disorder Maternal Uncle      cataracts     Neurologic Disorder Sister      migraines     Neurologic Disorder Sister      migraines     Respiratory Son      asthma     Cerebrovascular Disease Maternal Uncle      C.A.D. Brother      Family History Negative Other      neg for RA, SLE     Unknown/Adopted No family hx of      unknown neurological issues in her family, mother was adopted     Skin Cancer No family hx of      No known family hx of skin cancer       SOCIAL HISTORY:  Social History     Social History     Marital status: Single     Spouse name: N/A     Number of children: 1     Years of education: N/A     Occupational History      None      Social History Main Topics     Smoking status: Former Smoker     Packs/day: 0.20     Years: 1.00      "Types: Cigarettes     Quit date: 2/1/2016     Smokeless tobacco: Never Used     Alcohol use No     Drug use: No     Sexual activity: Not Currently     Other Topics Concern     Parent/Sibling W/ Cabg, Mi Or Angioplasty Before 65f 55m? Yes     Social History Narrative    Balanced Diet - sometimes    Osteoporosis Prevention Measures - Dairy servings per day: 2 servings weekly    Regular Exercise -  Yes Describe walking 4 times a week    Dental Exam - NO    Seatbelts used - Yes    Self Breast Exam - Yes    Abuse: Current or Past (Physical, Sexual or Emotional)- No    Do you have any concerns about STD's -  No    Do you feel safe in your environment - No    Guns stored in the home - No    Sunscreen used - Yes    Lipids -  YES - Date: 053102    Glucose -  YES - Date: 814785    Eye Exam - YES - Date: one year ago    Colon Cancer Screening - No    Hemoccults - NO    Pap Test -  YES - Date: 070904, remote history of LEEP    Mammography - YES - Date: last spring, would like to discuss, needs a referral to [facility]    DEXA - NO    Immunizations reviewed and up to date - Yes, last td given in 1997 or 1998       ROS:   Constitutional: No fever, chills, or sweats. No weight gain/loss   ENT: No visual disturbance, ear ache, epistaxis, sore throat  Allergies/Immunologic: Negative.   Respiratory: No cough, hemoptysia  Cardiovascular: As per HPI  GI: No nausea, vomiting, hematemesis, melena, or hematochezia  : No urinary frequency, dysuria, or hematuria  Integument: Negative  Psychiatric: Negative  Neuro: Negative  Endocrinology: Negative   Musculoskeletal: Negative    EXAM:  /79 (BP Location: Left arm, Patient Position: Chair, Cuff Size: Adult Large)  Pulse 72  Ht 1.6 m (5' 3\")  Wt 76.8 kg (169 lb 6.4 oz)  SpO2 98%  BMI 30.01 kg/m2  In general, the patient is a pleasant female in no apparent distress.    HEENT: NC/AT.  PERRLA.  EOMI.  Sclerae white, not injected.  Nares clear.  Pharynx without erythema " or exudate.  Dentition intact.    Neck: No adenopathy.  No thyromegaly. Carotids +4/4 bilaterally without bruits.  No jugular venous distension.   Heart: RRR. Normal S1, S2 splits physiologically. No murmur, rub, click, or gallop. The PMI is in the 5th ICS in the midclavicular line. There is no heave.    Lungs: CTA.  No ronchi, wheezes, rales.  No dullness to percussion.   Abdomen: Soft, nontender, nondistended. No organomegaly.  No bruits.   Extremities: No clubbing, cyanosis, or edema.  The pulses are +4/4 at the radial, brachial, femoral, popliteal, DP, and PT sites bilaterally.  No bruits are noted.  Neurologic: Alert and oriented to person/place/time, normal speech, gait and affect  Skin: No petechiae, purpura or rash.    Labs:  LIPID RESULTS:  Lab Results   Component Value Date    CHOL 124 07/18/2018    HDL 35 (L) 07/18/2018    LDL 62 07/18/2018    TRIG 134 07/18/2018    CHOLHDLRATIO 3.5 07/29/2015    NHDL 89 07/18/2018       LIVER ENZYME RESULTS:  Lab Results   Component Value Date    AST 17 07/18/2018    ALT 23 07/18/2018       CBC RESULTS:  Lab Results   Component Value Date    WBC 10.3 02/16/2018    RBC 4.88 02/16/2018    HGB 12.7 02/16/2018    HCT 38.3 02/16/2018    MCV 79 02/16/2018    MCH 26.0 (L) 02/16/2018    MCHC 33.2 02/16/2018    RDW 12.8 02/16/2018     02/16/2018       BMP RESULTS:  Lab Results   Component Value Date     07/18/2018    POTASSIUM 3.5 07/18/2018    CHLORIDE 105 07/18/2018    CO2 22 07/18/2018    ANIONGAP 10 07/18/2018    GLC 64 (L) 07/18/2018    BUN 12 07/18/2018    CR 1.06 (H) 07/18/2018    GFRESTIMATED 54 (L) 07/18/2018    GFRESTBLACK 66 07/18/2018    KATHY 9.0 07/18/2018        A1C RESULTS:  Lab Results   Component Value Date    A1C 8.4 (H) 11/15/2017       INR RESULTS:  Lab Results   Component Value Date    INR 0.97 08/25/2016    INR 0.98 05/20/2015       Procedures:    Echocardiogram 07-18-18    Global and regional left ventricular function is normal with an EF of  60-65%.  Right ventricular function, chamber size, wall motion, and thickness are  normal.  The inferior vena cava is normal.  No pericardial effusion is present.  There has been no change.  _____________________________________________________________________________  __        Left Ventricle  Global and regional left ventricular function is normal with an EF of 60-65%.  Left ventricular wall thickness is normal. Left ventricular size is normal.  Left ventricular diastolic function is normal. No regional wall motion  abnormalities are seen.     Right Ventricle  Right ventricular function, chamber size, wall motion, and thickness are  normal.     Atria  Both atria appear normal. The atrial septum is intact as assessed by color  Doppler .     Mitral Valve  The mitral valve is normal. Trace to mild mitral insufficiency is present.        Aortic Valve  Aortic valve is normal in structure and function.     Tricuspid Valve  The tricuspid valve is normal. Trace tricuspid insufficiency is present. The  right ventricular systolic pressure is approximated at 22.8 mmHg plus the  right atrial pressure. Pulmonary artery systolic pressure is normal.     Pulmonic Valve  The pulmonic valve cannot be assessed.     Vessels  The aorta root is normal. The pulmonary artery cannot be assessed. The  inferior vena cava is normal.     Pericardium  No pericardial effusion is present.        Compared to Previous Study  There has been no change.  _____________________________________________________________________________  __  MMode/2D Measurements & Calculations  IVSd: 1.00 cm     LVIDd: 4.1 cm  LVIDs: 2.4 cm  LVPWd: 1.0 cm  FS: 40.5 %  LV mass(C)d: 131.8 grams  LV mass(C)dI: 74.0 grams/m2  asc Aorta Diam: 3.0 cm  LVOT diam: 2.0 cm  LVOT area: 3.2 cm2  LA Volume (BP): 54.0 ml  LA Volume Index (BP): 30.3 ml/m2  RWT: 0.50           Doppler Measurements & Calculations  MV E max ash: 82.0 cm/sec  MV A max ash: 81.5 cm/sec  MV E/A: 1.0  MV dec  slope: 465.6 cm/sec2  MV dec time: 0.18 sec  TR max ash: 238.9 cm/sec  TR max P.8 mmHg  E/E' av.9  Lateral E/e': 11.8  Medial E/e': 12.0     _____________________________________________________________________________      Assessment and Plan:     We discussed the results with patient:  We emphasized the importance of a heart healthy diet.  BP and lipids are well controlled - patient denies angina.  Patient will go back to her specialist [facility] to discuss the CT scan form sinuses:    We gave following written instructions that we discussed with patient.    Medication Changes: None.     Studies Ordered: Echocardiogram in one year.     The results from today include: Echocardiogram and labs.     Please follow up: With Dr. Peace with fasting labs prior.    Milan Peace MD, PhD  Professor of Medicine  Division of Cardiology    CC  Patient Care Team:  Lauren Francis, RN as PCP - General  Milan Peace MD as MD (Cardiology)  Majo Mckinnon MD as MD (Rheumatology)  Jas Vernon MD as MD (Ophthalmology)  Jas Vernon MD as Referring Physician (Ophthalmology)  Charis Holbrook MD as Resident (Ophthalmology)  DARLING THOMPSON    Please do not hesitate to contact me if you have any questions/concerns.     Sincerely,     Milan Peace MD

## 2018-07-18 NOTE — PATIENT INSTRUCTIONS
Dear Janine Cornell,    Regular eye exams are required while taking hydroxychloroquine (Plaquenil). Eye exams should be completed by an eye specialist who is experienced in monitoring for hydroxychloroquine toxicity (a rare effect of the drug that can damage your eyes and vision). These may be yearly or as determined by your eye specialist. You will also need a baseline eye exam within the first year of starting hydroxychloroquine.        Although vision problems and loss of sight while taking hydroxychloroquine are very rare, notify your doctor immediately if you notice changes in your vision. The goal of screening is to detect toxicity before your vision is significantly or noticeably impacted. Failing to get proper screening exams puts you at risk of vision changes which may or may not be reversible.      Per the American Academy of Ophthalmology recommendations (2016), screening tests performed may include a 10-2 visual field test, spectral-domain optical coherence tomography (SD OCT), or other screening tests as determined by the eye specialist, including a multifocal electroretinogram (mfERG) or fundus auto-fluorescence (FAF).  We encourage you to bring this letter to your eye exam to discuss the exams that will be performed during your visit. Please request your eye clinic fax or mail a copy of your eye exam reports to our clinic indicating that testing was completed for hydroxychloroquine toxicity screening. The exam notes must specifically comment on the potential for hydroxychloroquine toxicity and outline recommended follow-up.  If you have questions about hydroxychloroquine or the information in this letter, please call the clinic at [phone] or talk to your provider at your next office visit.               1  Eye Specialist Letter  [facility] RHEUMATOLOGY  [address]  [address]  [facility] [address]  Phone: [phone]  Fax: [phone]    Dear Eye Specialist,  To ensure safe use of  Plaquenil (hydroxychloroquine), we are requesting your assistance in providing the following information. Please return this form to our clinic via mail or fax, or incorporate this information into your visit summary. Your note must indicate whether or not there is evidence of toxicity from Plaquenil use. For questions regarding this form, please call [phone].  Patient Name:   :        Date of Exam:    The following exams were completed during this visit in accordance with the American Academy of Ophthalmology recommendations (2016):  ? 10-2 automated visual field  ? Spectral-domain optical coherence tomography (SD OCT)  ? Multifocal electroretinogram (mfERG)  ? Fundus autofluorescence (FAF)  ? Other (please specify)  Please select from the following:  ? The findings from the above exams are not suggestive of toxicity from Plaquenil (hydroxychloroquine).  ? The findings from the above exams may suggest toxicity from Plaquenil (hydroxychloroquine).  Directly contact the clinic and fax this form.  Please provide additional guidance on whether or not the medication may be continued from your perspective:  Date of next recommended Plaquenil (hydroxychloroquine) screening eye exam:   ? 5 years  ? 1 year  ? 6 months  Other (please specify):   Eye Specialist Name (print):                                                                      Date:  Eye Specialist Signature:

## 2018-07-18 NOTE — MR AVS SNAPSHOT
After Visit Summary   7/18/2018    Janine Cornell    MRN: [id number]           Patient Information     Date Of Birth          1965        Visit Information        Provider Department      7/18/2018 3:30 PM Milan Peace MD 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      If you have any questions, you can reach my nurse, Selena Underwood LPN, at [phone].  Press Option #1 for the [facility], and then press Option #3 for nursing.    We are encouraging the use of Click[employer] Services to communicate with your HealthCare Provider    Medication Changes: None.    Studies Ordered: Echocardiogram in one year.    The results from today include: Echocardiogram and labs.    Please follow up: With Dr. Peace with fasting labs prior.    Sincerely,    Milan Peace MD     If you have an urgent need after hours (8:00 am to 4:30 pm) please call [phone] and ask for the cardiology fellow on call.                    Follow-ups after your visit        Additional Services     Follow-Up with Cardiologist                 Your next 10 appointments already scheduled     Jul 25, 2018  3:20 PM CDT   (Arrive by 3:05 PM)   PHYSICAL with Kash Solano MD   [facility] ([facility])    [address] [place] [id number]    [phone]            Sep 07, 2018  3:00 PM CDT   (Arrive by 2:45 PM)   Return Visit with Majo Mckinnon MD   [facility] ([facility])    [address]  [address]  [facility] [address] [phone]              Future tests that were ordered for you today     Open Future Orders        Priority Expected Expires Ordered    Follow-Up with Cardiologist Routine 7/18/2019 7/19/2019 7/18/2018    Comprehensive metabolic panel Routine 7/18/2019 7/19/2019 7/18/2018    Lipid panel reflex to direct LDL Fasting Routine 7/18/2019 7/19/2019 7/18/2018    Echo Complete Routine 7/18/2019 7/19/2019 7/18/2018            Who to contact     If you have questions or need follow up information about today's clinic visit or your schedule please contact [facility] directly at [phone].  Normal or non-critical lab and imaging results will be communicated to you by Ynsecthart, letter or phone within 4 business days after the clinic has received the results. If you do not hear from us within 7 days, please contact the clinic through Arbovaxt or phone. If you have a critical or abnormal lab result, we will notify you by phone as soon as possible.  Submit refill requests through [email or web address] or call your pharmacy and they will forward the refill request to us. Please allow 3 business days for your refill to be completed.          Additional Information About Your Visit        Ynsecthart Information     [email or web address] gives you secure access to your electronic health record. If you see a primary care provider, you can also send messages to your care team and make appointments. If you have questions, please call your primary care clinic.  If you do not have a primary care provider, please call [phone] and they will assist you.        Care EveryWhere ID     This is your Care EveryWhere ID. This could be used by other organizations to access your [facility] medical records  [id number]        Your  "Vitals Were     Pulse Height Pulse Oximetry BMI (Body Mass Index)          72 1.6 m (5' 3\") 98% 30.01 kg/m2         Blood Pressure from Last 3 Encounters:   07/18/18 121/79   06/15/18 119/82   02/16/18 118/75    Weight from Last 3 Encounters:   07/18/18 76.8 kg (169 lb 6.4 oz)   06/15/18 75.1 kg (165 lb 8 oz)   02/16/18 74.4 kg (164 lb)              We Performed the Following     Follow-Up with Cardiologist          Today's Medication Changes          These changes are accurate as of 7/18/18  5:31 PM.  If you have any questions, ask your nurse or doctor.               These medicines have changed or have updated prescriptions.        Dose/Directions    vitamin D 30981 UNIT capsule   Commonly known as:  ERGOCALCIFEROL   This may have changed:  additional instructions   Used for:  Vitamin D deficiency        Dose:  92287 Units   Take 1 capsule (50,000 Units) by mouth every 7 days Need a Vitamin D level in 2 months.   Quantity:  8 capsule   Refills:  0                Primary Care Provider    Lauren Francis RN       No address on file        Equal Access to Services     [facility]: Hadjose angel June, acacia watters, gagandeep silva. So [facility] [phone].    ATENCIÓN: Si habla español, tiene a burch disposición servicios gratuitos de asistencia lingüística. Llame al [phone].    We comply with applicable federal civil rights laws and [place] laws. We do not discriminate on the basis of race, color, national origin, age, disability, sex, sexual orientation, or gender identity.            Thank you!     Thank you for choosing [facility]  for your care. Our goal is always to provide you with excellent care. Hearing back from our patients is one way we can continue to improve our services. Please take a few minutes to complete the written survey that you may receive in the mail after your visit with us. Thank you!             Your Updated " Medication List - Protect others around you: Learn how to safely use, store and throw away your medicines at www.disposemymeds.org.          This list is accurate as of 7/18/18  5:31 PM.  Always use your most recent med list.                   Brand Name Dispense Instructions for use Diagnosis    acetaminophen 325 MG tablet    TYLENOL    250 tablet    Take 1-2 tablets (325-650 mg) by mouth every 6 hours as needed for pain (headache)    Other chronic pain, Headache(784.0)       AEROSPAN 80 MCG/ACT Aers oral inhaler   Generic drug:  flunisolide HFA      INHALE 2 PUFFS PO BID.  RINSE MOUTH AFTERWARDS        * albuterol 108 (90 Base) MCG/ACT Inhaler    PROAIR HFA/PROVENTIL HFA/VENTOLIN HFA    3 Inhaler    Inhale 2 puffs into the lungs every 6 hours as needed for shortness of breath / dyspnea or wheezing        * albuterol (2.5 MG/3ML) 0.083% neb solution      INL 1 VIAL VIA NEBULIZATION Q 4 TO 6 HOURS PRN        ASPIRIN LOW DOSE 81 MG EC tablet   Generic drug:  aspirin      Take 81 mg by mouth daily        azelastine 0.1 % spray    ASTELIN     U 2 SPRAYS IEN BID        bacitracin ophthalmic ointment     3.5 g    Apply to incision line three times daily.    Postoperative eye state       * insulin glargine 100 UNIT/ML injection    LANTUS     Inject 40 Units Subcutaneous daily (with breakfast)        * BASAGLAR 100 UNIT/ML injection     12 mL    Inject 40 Units Subcutaneous daily    Type 2 diabetes mellitus without complication (H)       blood glucose monitoring lancets     4 Box    Use to test blood sugars 4 times daily or as directed.    Type 2 diabetes, HbA1c goal < 7% (H)       blood glucose monitoring meter device kit    no brand specified    1 kit    Use to test blood sugar 4 X times daily or as directed.    Type 2 diabetes, HbA1c goal < 7% (H)       blood glucose monitoring test strip    no brand specified    360 each    1 strip by In Vitro route 4 times daily Test as directed. Please dispense three months and three  months of refills. Thank you.    Type 2 diabetes, HbA1c goal < 7% (H)       clopidogrel 75 MG tablet    PLAVIX    30 tablet    Take 1 tablet (75 mg) by mouth daily        * COMPRESSION STOCKINGS     4 each    2 each daily    Bilateral lower extremity edema, Venous incompetence       * COMPRESSION STOCKINGS     4 each    2 each daily Apply one 10-15 mmHg compression stocking to each lower extgmierty during the day and remove before bedtime.    Venous incompetence, Bilateral lower extremity edema       cyclobenzaprine 10 MG tablet    FLEXERIL    180 tablet    Take 1 tablet (10 mg) by mouth 2 times daily as needed for muscle spasms    Fibromyalgia       DAILY-GERALD Tabs      Take 1 tablet by mouth daily        desonide 0.05 % cream    DESOWEN     Apply topically 2 times daily        dicyclomine 20 MG tablet    BENTYL    60 tablet    Take 1 tablet (20 mg) by mouth 4 times daily as needed    Other irritable bowel syndrome       diphenhydrAMINE 25 MG capsule    BENADRYL     TK 1 TO 2 CS PO QHS        EPIPEN 2-RIKY 0.3 MG/0.3ML injection 2-pack   Generic drug:  EPINEPHrine      INJECT 0.3 MG INTO THE MUSCLE PRF ANAPHYALAXIS        ferrous gluconate 324 (38 Fe) MG tablet    FERGON    180 tablet    Take 1 tablet (324 mg) by mouth 2 times daily    AILEEN (iron deficiency anemia)       fexofenadine 180 MG tablet    ALLEGRA    90 tablet    Take 1 tablet by mouth daily as needed.    Chronic rhinitis       fluocinolone 0.01 % solution    SYNALAR     Apply topically daily as needed        fluocinonide 0.05 % solution    LIDEX    60 mL    Apply topically 2 times daily To areas of itch on the scalp as needed.    Telogen effluvium       folic acid 1 MG tablet    FOLVITE    90 tablet    Take 1 tablet by mouth daily    Inflammatory arthritis       hydroxychloroquine 200 MG tablet    PLAQUENIL    180 tablet    Take 2 tablets (400 mg) by mouth daily    Inflammatory arthritis       insulin pen needle 32G X 4 MM    BD MARCK U/F    300 each     USE DAILY OR AS DIRECTED    Type 2 diabetes, HbA1c goal < 7% (H)       * ketoconazole 2 % shampoo    NIZORAL     Apply topically daily as needed        * ketoconazole 2 % cream    NIZORAL     Apply topically as needed        lidocaine 5 % ointment    XYLOCAINE    50 g    Apply 1 g topically 2 times daily as needed for moderate pain    Fibromyalgia, Rheumatoid arthritis involving both wrists with positive rheumatoid factor (H)       lisinopril-hydrochlorothiazide 20-25 MG per tablet    PRINZIDE/ZESTORETIC    30 tablet    Take 1 tablet by mouth daily    Hypertension, unspecified type       Magnesium Oxide -Mg Supplement 250 MG Tabs      TK 1 T PO BID. REDUCE IF STOOLS LOOSEN        metFORMIN 500 MG 24 hr tablet    GLUCOPHAGE-XR    180 tablet    Take 2 tablets (1,000 mg) by mouth daily (with dinner)    Type 2 diabetes mellitus not at goal (H)       metoprolol succinate 100 MG 24 hr tablet    TOPROL XL    30 tablet    Take 1 tablet (100 mg) by mouth daily    Benign essential hypertension       metroNIDAZOLE 1 % cream    NORITATE     Apply topically daily        mometasone 50 MCG/ACT spray    NASONEX     Spray 2 sprays into both nostrils daily        montelukast 10 MG tablet    SINGULAIR    30 tablet    Take 1 tablet (10 mg) by mouth At Bedtime    Uncomplicated asthma, unspecified asthma severity       nitroGLYcerin 0.4 MG sublingual tablet    NITROSTAT    25 tablet    Place 1 tablet (0.4 mg) under the tongue every 5 minutes as needed for chest pain    NSTEMI (non-ST elevated myocardial infarction) (H)       nystatin 668664 units Tabs tablet    MYCOSTATIN     TK 2 TS PO BID        olopatadine 0.1 % ophthalmic solution    PATANOL    5 mL    Place 1 drop into both eyes 2 times daily as needed for allergies.    Chronic rhinitis       pravastatin 40 MG tablet    PRAVACHOL    30 tablet    Take 1 tablet (40 mg) by mouth daily    Coronary artery disease involving native heart without angina pectoris, unspecified vessel or  lesion type       ranitidine 150 MG tablet    ZANTAC    180 tablet    Take 1 tablet (150 mg) by mouth 2 times daily    Gastritis       spironolactone 50 MG tablet    ALDACTONE    45 tablet    Take 1 tablet (50 mg) by mouth daily . Take additional 0.5 tablets by mouth once daily as needed for weight gain.    Hypertension goal BP (blood pressure) < 140/90       sucralfate 1 GM tablet    CARAFATE    120 tablet    Take 1 tablet (1 g) by mouth 4 times daily as needed    Abdominal pain, epigastric       SYMBICORT 80-4.5 MCG/ACT Inhaler   Generic drug:  budesonide-formoterol      INHALE 2 PUFFS PO BID RINSE AND EXPECTORATE AFTER        traMADol 50 MG tablet    ULTRAM    60 tablet    Take 1 tablet (50 mg) by mouth every 8 hours as needed for moderate pain    Undifferentiated connective tissue disease (H)       TUMS 500 MG chewable tablet   Generic drug:  calcium carbonate      Take 3-4 chew tab by mouth daily as needed.        vitamin D 39519 UNIT capsule    ERGOCALCIFEROL    8 capsule    Take 1 capsule (50,000 Units) by mouth every 7 days Need a Vitamin D level in 2 months.    Vitamin D deficiency       ZOFRAN PO           * Notice:  This list has 8 medication(s) that are the same as other medications prescribed for you. Read the directions carefully, and ask your doctor or other care provider to review them with you.

## 2018-07-18 NOTE — NURSING NOTE
Chief Complaint   Patient presents with     Follow Up For      6 Mo F/U     Vitals were taken and medications were reconciled.  Nico Garcia, RMA  3:39 PM

## 2018-07-20 NOTE — NURSING NOTE
Cardiac Testing: Patient given instructions regarding  echocardiogram . Discussed purpose, preparation, procedure and when to expect results reported back to the patient. Patient demonstrated understanding of this information and agreed to call with further questions or concerns.  Labs: Patient was given results of the laboratory testing obtained today. Patient was instructed to return for the next laboratory testing in one year. Patient demonstrated understanding of this information and agreed to call with further questions or concerns.   Med Reconcile: Reviewed and verified all current medications with the patient. The updated medication list was printed and given to the patient.  Return Appointment: Patient given instructions regarding scheduling next clinic visit. Patient demonstrated understanding of this information and agreed to call with further questions or concerns.  Patient stated she understood all health information given and agreed to call with further questions or concerns.    Selena Underwood LPN

## 2018-07-24 NOTE — PROGRESS NOTES
[facility]  Primary Care Center   Kash Solano MD  07/25/2018      Chief Complaint:   Physical       History of Present Illness:   Janine Cornell is a 53 year old female with a complex medical history who presents for evaluation of a physical.    Eye Pain/Spinal Stenosis/General Pain:  Janine has brought today a letter from her acupuncturist that says her meetings with them are to end. She was alerted her insurance only covers 20 meetings in a year. Janine notes she stopped receiving pain medication and topical ointments including Lidocaine. She requests this because of her spinal stenosis. She has tried medications, physical therapy and multiple botox injections. All have not created lasting relief from her symptoms. She has tried Gabapentin but received headaches from it. She is unsure if she has tried Lyrica but believes it might not mix well with her current medications. Dr. Mckinnon currently has Janine taking Plaquenil. She has tried Tramadol which has worked to an extent but not to her liking. She is unsure if she has had a cortisone shot. Janine states that her eyeballs are presently pushing out. At times she does have swelling around her eyes. She notes it is not tears but instead a thick white or yellow mucus. Her swelling varies day to day. She recently saw her ophthalmologist and need to return a few days later with this problem. She was diagnosed with angioneurotic edema. She has had MRI's completed. She does have headaches, nasal pressure and pain, and ear pressure. She reports suffering severely from allergies this past winter which is unnatural for her. Janine has just begun visiting an immunologist. Janine is open to trying pool therapy. She receives acupuncture for all of these problems. Intermittently her arms, neck and her legs (more noticeably her left) experience swelling. Her shoes have not gotten to tight often. When they due she notices it when it is humid out. She wonders if this is a pattern to her  swelling. Janine states that her bowels have been working overtime. She recently has gotten over a bladder infection. She has also just gotten over E. Coli.    Diabetes:  Janine did have a bad episode this spring regarding her diabetes. She has received counseling on this. She was alerted to continue taking her insulin everyday and increase her dose slightly if she feels the need to. 2 weeks after doing so her stomach pain she had been suffering from subsided. She reports having a bunch of blood work done in the past month. She did have an A1c completed at [facility] approximately one month ago.    Other concerns discussed:  Janine does have a gynecologist. She did do a colonoscopy in [facility] and recalls having polyps in Fall 2016. She does not have a dentist at this time.     Review of Systems:   Pertinent items are noted in HPI, remainder of complete ROS is negative.      Active Medications:      acetaminophen (TYLENOL) 325 MG tablet, Take 1-2 tablets (325-650 mg) by mouth every 6 hours as needed for pain (headache), Disp: 250 tablet, Rfl: 0     AEROSPAN 80 MCG/ACT AERS, INHALE 2 PUFFS PO BID.  RINSE MOUTH AFTERWARDS, Disp: , Rfl: 4     albuterol (2.5 MG/3ML) 0.083% neb solution, INL 1 VIAL VIA NEBULIZATION Q 4 TO 6 HOURS PRN, Disp: , Rfl: 1     albuterol (PROAIR HFA, PROVENTIL HFA, VENTOLIN HFA) 108 (90 BASE) MCG/ACT inhaler, Inhale 2 puffs into the lungs every 6 hours as needed for shortness of breath / dyspnea or wheezing, Disp: 3 Inhaler, Rfl: 1     ASPIRIN LOW DOSE 81 MG EC tablet, Take 81 mg by mouth daily, Disp: , Rfl: 0     azelastine (ASTELIN) 0.1 % spray, U 2 SPRAYS IEN BID, Disp: , Rfl: 0     bacitracin ophthalmic ointment, Apply to incision line three times daily. (Patient not taking: Reported on 12/20/2017), Disp: 3.5 g, Rfl: 0     BASAGLAR 100 UNIT/ML injection, Inject 40 Units Subcutaneous daily (Patient not taking: Reported on 12/20/2017), Disp: 12 mL, Rfl: 2     calcium carbonate (TUMS) 500 MG chewable  tablet, Take 3-4 chew tab by mouth daily as needed., Disp: , Rfl:      clopidogrel (PLAVIX) 75 MG tablet, Take 1 tablet (75 mg) by mouth daily, Disp: 30 tablet, Rfl: 11     cyclobenzaprine (FLEXERIL) 10 MG tablet, Take 1 tablet (10 mg) by mouth 2 times daily as needed for muscle spasms, Disp: 180 tablet, Rfl: 0     desonide (DESOWEN) 0.05 % cream, Apply topically 2 times daily, Disp: , Rfl:      dicyclomine (BENTYL) 20 MG tablet, Take 1 tablet (20 mg) by mouth 4 times daily as needed, Disp: 60 tablet, Rfl: 5     diphenhydrAMINE (BENADRYL) 25 MG capsule, TK 1 TO 2 CS PO QHS, Disp: , Rfl: 4     EPIPEN 2-RIKY 0.3 MG/0.3ML injection, INJECT 0.3 MG INTO THE MUSCLE PRF ANAPHYALAXIS, Disp: , Rfl: 0     ferrous gluconate (FERGON) 324 (38 Fe) MG tablet, Take 1 tablet (324 mg) by mouth 2 times daily, Disp: 180 tablet, Rfl: 1     fexofenadine (ALLEGRA) 180 MG tablet, Take 1 tablet by mouth daily as needed., Disp: 90 tablet, Rfl: 3     fluocinolone (SYNALAR) 0.01 % solution, Apply topically daily as needed, Disp: , Rfl:      fluocinonide (LIDEX) 0.05 % external solution, Apply topically 2 times daily To areas of itch on the scalp as needed., Disp: 60 mL, Rfl: 11     folic acid (FOLVITE) 1 MG tablet, Take 1 tablet by mouth daily, Disp: 90 tablet, Rfl: 3     hydroxychloroquine (PLAQUENIL) 200 MG tablet, Take 2 tablets (400 mg) by mouth daily, Disp: 180 tablet, Rfl: 0     insulin glargine (LANTUS) 100 UNIT/ML injection, Inject 40 Units Subcutaneous daily (with breakfast), Disp: , Rfl:      insulin pen needle (BD MARCK U/F) 32G X 4 MM, USE DAILY OR AS DIRECTED, Disp: 300 each, Rfl: 3     ketoconazole (NIZORAL) 2 % cream, Apply topically as needed , Disp: , Rfl: 3     ketoconazole (NIZORAL) 2 % shampoo, Apply topically daily as needed, Disp: , Rfl:      lidocaine (XYLOCAINE) 5 % ointment, Apply 1 g topically 2 times daily as needed for moderate pain (Patient not taking: Reported on 12/20/2017), Disp: 50 g, Rfl: 5      lisinopril-hydrochlorothiazide (PRINZIDE/ZESTORETIC) 20-25 MG per tablet, Take 1 tablet by mouth daily, Disp: 30 tablet, Rfl: 11     Magnesium Oxide -Mg Supplement 250 MG TABS, TK 1 T PO BID. REDUCE IF STOOLS LOOSEN, Disp: , Rfl: 11     metFORMIN (GLUCOPHAGE-XR) 500 MG 24 hr tablet, Take 2 tablets (1,000 mg) by mouth daily (with dinner), Disp: 180 tablet, Rfl: 1     metoprolol succinate (TOPROL-XL) 100 MG 24 hr tablet, Take 1 tablet (100 mg) by mouth daily, Disp: 30 tablet, Rfl: 11     metroNIDAZOLE (NORITATE) 1 % cream, Apply topically daily, Disp: , Rfl:      mometasone (NASONEX) 50 MCG/ACT spray, Spray 2 sprays into both nostrils daily, Disp: , Rfl:      montelukast (SINGULAIR) 10 MG tablet, Take 1 tablet (10 mg) by mouth At Bedtime, Disp: 30 tablet, Rfl: 1     Multiple Vitamin (DAILY-GERALD) TABS, Take 1 tablet by mouth daily, Disp: , Rfl: 0     nitroGLYcerin (NITROSTAT) 0.4 MG sublingual tablet, Place 1 tablet (0.4 mg) under the tongue every 5 minutes as needed for chest pain, Disp: 25 tablet, Rfl: 1     nystatin (MYCOSTATIN) 968640 UNITS TABS tablet, TK 2 TS PO BID, Disp: , Rfl: 0     olopatadine (PATANOL) 0.1 % ophthalmic solution, Place 1 drop into both eyes 2 times daily as needed for allergies., Disp: 5 mL, Rfl: 12     Ondansetron HCl (ZOFRAN PO), , Disp: , Rfl:      pravastatin (PRAVACHOL) 40 MG tablet, Take 1 tablet (40 mg) by mouth daily, Disp: 30 tablet, Rfl: 11     ranitidine (ZANTAC) 150 MG tablet, Take 1 tablet (150 mg) by mouth 2 times daily, Disp: 180 tablet, Rfl: 1     spironolactone (ALDACTONE) 50 MG tablet, Take 1 tablet (50 mg) by mouth daily . Take additional 0.5 tablets by mouth once daily as needed for weight gain., Disp: 45 tablet, Rfl: 11     sucralfate (CARAFATE) 1 GM tablet, Take 1 tablet (1 g) by mouth 4 times daily as needed, Disp: 120 tablet, Rfl: 3     SYMBICORT 80-4.5 MCG/ACT Inhaler, INHALE 2 PUFFS PO BID RINSE AND EXPECTORATE AFTER, Disp: , Rfl: 3     traMADol (ULTRAM) 50 MG  tablet, Take 1 tablet (50 mg) by mouth every 8 hours as needed for moderate pain, Disp: 60 tablet, Rfl: 3     vitamin D (ERGOCALCIFEROL) 61952 UNIT capsule, Take 1 capsule (50,000 Units) by mouth every 7 days Need a Vitamin D level in 2 months. (Patient taking differently: Take 50,000 Units by mouth every 7 days Need a Vitamin D level in 2 months.), Disp: 8 capsule, Rfl: 0      Allergies:   Amoxicillin-pot clavulanate; Augmentin; Azithromycin; Diatrizoate; Imitrex [sumatriptan]; Penicillins; Pork allergy; Shellfish allergy; Sulfa drugs; and Zithromax [azithromycin dihydrate]      Past Medical History:  Abnormal glandular Papanicolaou smear of cervix  Allergic rhinitis  Arthritis  ASCVD  Chronic pain  Chronic pancreatitis  Common migraine  CAD  costochondritis  Difficulty in walking  Dyspnea on exertion  Ectasia, mammary duct  Elevated fasting glucose  Gastro-oesophageal reflux disease  Hernia  History of angina  Hyperlipidemia  Hypertension  Iron deficiency anemia  Ischemic cardiomyopathy  Menorrhagia  Migraine headaches  Mild persistent asthma  Neuritis optic  NSTEMI  Numbness and tingling  Numbness in feet  Obesity  PCOS  PONV  S/P coronary artery stent placement  Seasonal affective disorder  Shortness of Breath  Type II diabetes  Unspecified cerebral artery occlusion with cerebral infarction  Uterine leiomyoma  Vasculitis retinal  Ventral hernia, unspecified, without mention of obstruction or gangrene  Vitamin D deficiency  Breast cancer screening  Spinal stenosis in cervical region  Seronegative RA  Overweight  Tobacco use disorder  Telogen effluvium  CAD S/P percutaneous coronary angioplasty  Status post coronary angiogram     Past Surgical History:  C echo heart xthoracic, complete, w/o doppler  C/section, low transverse  Cardiac surgery, cardiac stent, 4 other stents, most recent 2/9/16  Dilation and curettage  HC UGI endoscopy w EUS  Hernia repair  Insert intrauterine device INT uterine fibriod  embolization  Laparoscopic cholecystectomy  Leep TX cervical  Orbitotomy x2, both right  Repair ptosis, right  Upper GI endoscopy, mild gastritis    Family History:   Heart Disease- Father (heart attack, 50), Mother (long qt syndrome), Brother (MI at 15, 16 now )  Cerebrovascular disease- Father, Maternal Uncle  Diabetes- Father, Maternal uncle  Hypertension- Father, Mother, Maternal Aunt, Maternal Uncle  Depression- Father  CAD- Father, Mother, Brother  Arthritis- Mother, Brother, Maternal Uncle  Thyroid Disease- Mother  Cataracts- Maternal Uncle  Neurologic Disorder- Sister (migraines) x2  Asthma- Son  Family History Negative- Other (for RA, SLE)      Social History:   The patient was alone.  Smoking Status: Former Smoker, 0.20 PPD for 1 year, cigarettes, Quit: 2016   Smokeless Tobacco: Never used   Alcohol Use: no   Employment status: Unemployed     Physical Exam:   /85  Pulse 84  Resp 16  Wt 74.4 kg (164 lb)  Breastfeeding? No  BMI 29.05 kg/m2   Constitutional: Alert. In no distress.  Head: Normocephalic. No masses, lesions, tenderness or abnormalities.  ENT: Right eye- periorbital edema, thyroid mildly enlarged, rest of head and neck normal  Cardiovascular: RRR. No murmurs, clicks, gallops, or rub.  Respiratory: Clear to auscultation bilaterally, no wheezes or crackles.  Gastrointestinal: Abdomen soft. Non-tender. BS normal. No masses or organomegaly.  Musculoskeletal: Extremities normal. No gross deformities noted. Normal muscle tone.  Skin: No suspicious lesions. No rashes.  Neurologic: Gait normal. Reflexes normal and symmetric. Sensation grossly WNL.  Psychiatric: Mentation appears normal. Normal affect.   Hematologic/Lymphatic/Immunologic: Normal cervical lymph nodes.     Diabetes: labs up to date care everywhere. At goal next visit do urine sample. She also encoruaged to see dentist  Healthcare: has gyn with routine visits and we are tyring to get recent colono report, updated  ammomo last falll  Goiter- recehck thyroid ultrasound for no new nodules  Multiple pain conditions- try pool therapy. She has had gabapentin with sidee effects and has been told their is a contraindication for trying lyrica. She had a reaction to botox. She finds acupunture very helpful for pain mannagement. She has pain generated by spinal stenosis, fibromyalgia, and inflammatory arthritis the latter for which she take plaquenil from rheuma also she has pain in right face generated by her eye disease which is still being worked up by her eye doctor. She has leter from insurance company that they need further info to continue covering acu and I would ask that acu be covered to the extent of treatment suggest by acu provider tramadol did provide some pain relief but we can work to fine non-narcotic medications         Assessment and Plan:  DM type 2, goal HbA1c 7%-8% (H)  Labs are up to date over care everywhere. She is at the goal. Next visit we will do a urine sample. I encouraged her to see a dentist.    Fibromyalgia, inflammatory arthritis, and cervical stenosis of spinal canal  For her multiple pain conditions, I recommended she try pool therapy. She has had Gabapentin with side effects and has been told their is a contraindication for trying Lyrica. She had a reaction to Botox. She finds acupuncture very helpful for her pain management. She has pain generated by spinal stenosis, fibromyalgia, and inflammatory arthritis, the latter for which she takes Plaquenil from rheumatology. She also has pain in the right face generated by her eye disease which is still being worked up by her eye doctor. She has a letter from her insurance company that they need further information to continue covering acupuncture. I would ask that acupuncture be covered to the extend of treatment suggested by her acupuncturist provider. Tramadol did provide some pain relief ut we can work to find other non-narcotic medications.  - PHYSICAL  THERAPY REFERRAL    Goiter  Recheck thyroid ultrasound for no new nodules.  - US Thyroid    1. Has has a gynecologist with routine visits. We are trying to get her most recent colonoscopy report. She received a mammogram last fall.        Follow-up: Follow up after Thyroid Ultrasound results return         Scribe Disclosure:   I, Fermin Jacoboister, am serving as a scribe to document services personally performed by Kash Solano MD at this visit, based upon the provider's statements to me. All documentation has been reviewed by the aforementioned provider prior to being entered into the official medical record.     Portions of this medical record were completed by a scribe. UPON MY REVIEW AND AUTHENTICATION BY ELECTRONIC SIGNATURE, this confirms (a) I performed the applicable clinical services, and (b) the record is accurate.   Answers for HPI/ROS submitted by the patient on 7/25/2018   General Symptoms: Yes  Skin Symptoms: Yes  HENT Symptoms: Yes  EYE SYMPTOMS: Yes  HEART SYMPTOMS: No  LUNG SYMPTOMS: No  INTESTINAL SYMPTOMS: Yes  URINARY SYMPTOMS: Yes  GYNECOLOGIC SYMPTOMS: No  BREAST SYMPTOMS: No  SKELETAL SYMPTOMS: Yes  BLOOD SYMPTOMS: Yes  NERVOUS SYSTEM SYMPTOMS: Yes  MENTAL HEALTH SYMPTOMS: Yes  Fever: No  Loss of appetite: No  Weight loss: No  Weight gain: Yes  Fatigue: Yes  Night sweats: Yes  Chills: No  Increased stress: Yes  Excessive hunger: No  Excessive thirst: No  Feeling hot or cold when others believe the temperature is normal: Yes  Loss of height: No  Post-operative complications: No  Surgical site pain: Yes  Hallucinations: No  Change in or Loss of Energy: No  Hyperactivity: No  Confusion: No  Changes in hair: Yes  Changes in moles/birth marks: No  Itching: Yes  Rashes: Yes  Changes in nails: Yes  Acne: No  Hair in places you don't want it: No  Change in facial hair: No  Warts: No  Non-healing sores: Yes  Scarring: No  Flaking of skin: Yes  Color changes of hands/feet in cold : No  Sun  sensitivity: Yes  Skin thickening: No  Ear pain: No  Ear discharge: No  Hearing loss: No  Tinnitus: Yes  Nosebleeds: No  Congestion: Yes  Sinus pain: Yes  Trouble swallowing: No   Voice hoarseness: No  Mouth sores: No  Sore throat: No  Tooth pain: No  Gum tenderness: No  Bleeding gums: No  Change in taste: No  Change in sense of smell: No  Dry mouth: No  Hearing aid used: No  Neck lump: No  Eye pain: Yes  Vision loss: Yes  Dry eyes: Yes  Watery eyes: No  Eye bulging: Yes  Double vision: No  Flashing of lights: No  Spots: Yes  Floaters: Yes  Redness: Yes  Crossed eyes: No  Tunnel Vision: No  Yellowing of eyes: No  Eye irritation: Yes  Heart burn or indigestion: Yes  Nausea: Yes  Vomiting: Yes  Abdominal pain: Yes  Bloating: Yes  Constipation: No  Diarrhea: Yes  Blood in stool: No  Black stools: No  Rectal or Anal pain: No  Fecal incontinence: No  Yellowing of skin or eyes: No  Vomit with blood: No  Change in stools: No  Trouble holding urine or incontinence: Yes  Pain or burning: Yes  Trouble starting or stopping: No  Increased frequency of urination: No  Blood in urine: Yes  Decreased frequency of urination: No  Frequent nighttime urination: No  Flank pain: Yes  Difficulty emptying bladder: No  Back pain: Yes  Muscle aches: Yes  Neck pain: Yes  Swollen joints: Yes  Joint pain: Yes  Bone pain: No  Muscle cramps: Yes  Muscle weakness: Yes  Joint stiffness: Yes  Bone fracture: No  Anemia: No  Swollen glands: Yes  Easy bleeding or bruising: Yes  Edema or swelling: Yes  Trouble with coordination: Yes  Dizziness or trouble with balance: Yes  Fainting or black-out spells: No  Memory loss: No  Headache: Yes  Seizures: No  Speech problems: No  Tingling: Yes  Tremor: No  Weakness: Yes  Difficulty walking: No  Paralysis: No  Numbness: Yes  Nervous or Anxious: Yes  Depression: No  Trouble sleeping: No  Trouble thinking or concentrating: No  Mood changes: No  Panic attacks: No  Kash Solano MD

## 2018-07-24 NOTE — PROGRESS NOTES
HPI:       Ms Janine Cornell, who is a 53 yr -American patient with DM2, Hypothyroidism, PCOS, Dyslipidemia, HTN, and S/PNSTEMI (11/2011) s/p stent placement comes for follow-up.      Patient has a positive RA factor and fibromyalgia.  Since a few weeks patient experiences suddenly welling in the face, in the orbita and and also in the arms.  Patient showed me some images of this swelling on her cell phone.  Sometimes it is accompanied with paresthesias.  She cannot mention if it is related with food intake, or medication intake.  She underwent in [facility] a [place] scan of her head and sinuses - a few days days ago and she will see her physician soon who ordered.            PAST MEDICAL HISTORY:  Past Medical History:   Diagnosis Date     Abnormal glandular Papanicolaou smear of cervix 1992     Allergic rhinitis     Allergy, airborne subst     Arthritis      ASCVD (arteriosclerotic cardiovascular disease)      Chronic pain      Chronic pancreatitis (H)     idiopathic, Tx: PPI, antioxidants, pancreatic enzymes     Common migraine      Coronary artery disease      Costochondritis      Costochondritis      Difficulty in walking(719.7)      Dyspnea on exertion      Ectasia, mammary duct     [facility], persistent nipple discharge     Elevated fasting glucose      Gastro-oesophageal reflux disease      Hernia      History of angina      Hyperlipidemia LDL goal < 100      Hypertension goal BP (blood pressure) < 140/90     Essential hypertension     Iron deficiency anemia      Ischemic cardiomyopathy      Menorrhagia      Migraine headaches      Mild persistent asthma      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 Matias, neg w/u, Rx'd w/prednisone     Ventral hernia, unspecified, without mention of obstruction or gangrene 7/2012       CURRENT MEDICATIONS:  Current Outpatient Prescriptions   Medication Sig Dispense Refill     acetaminophen (TYLENOL) 325 MG tablet Take 1-2 tablets (325-650 mg) by mouth every 6 hours as needed for pain (headache) 250 tablet 0     AEROSPAN 80 MCG/ACT AERS INHALE 2 PUFFS PO BID.  RINSE MOUTH AFTERWARDS  4     albuterol (2.5 MG/3ML) 0.083% neb solution INL 1 VIAL VIA NEBULIZATION Q 4 TO 6 HOURS PRN  1     albuterol (PROAIR HFA, PROVENTIL HFA, VENTOLIN HFA) 108 (90 BASE) MCG/ACT inhaler Inhale 2 puffs into the lungs every 6 hours as needed for shortness of breath / dyspnea or wheezing 3 Inhaler 1     ASPIRIN LOW DOSE 81 MG EC tablet Take 81 mg by mouth daily  0     calcium carbonate (TUMS) 500 MG chewable tablet Take 3-4 chew tab by mouth daily as needed.       clopidogrel (PLAVIX) 75 MG tablet Take 1 tablet (75 mg) by mouth daily 30 tablet 11     cyclobenzaprine (FLEXERIL) 10 MG tablet Take 1 tablet (10 mg) by mouth 2 times daily as needed for muscle spasms 180 tablet 0     desonide (DESOWEN) 0.05 % cream Apply topically 2 times daily       dicyclomine (BENTYL) 20 MG tablet Take 1 tablet (20 mg) by mouth 4 times daily as needed 60 tablet 5     diphenhydrAMINE (BENADRYL) 25 MG capsule TK 1 TO 2 CS PO QHS  4     EPIPEN 2-RIKY 0.3 MG/0.3ML injection INJECT 0.3 MG INTO THE MUSCLE PRF ANAPHYALAXIS  0     ferrous gluconate (FERGON) 324 (38 Fe) MG tablet Take 1 tablet (324 mg) by mouth 2 times daily 180 tablet 1     fexofenadine (ALLEGRA) 180 MG tablet  Take 1 tablet by mouth daily as needed. 90 tablet 3     fluocinolone (SYNALAR) 0.01 % solution Apply topically daily as needed       fluocinonide (LIDEX) 0.05 % external solution Apply topically 2 times daily To areas of itch on the scalp as needed. 60 mL 11     folic acid (FOLVITE) 1 MG tablet Take 1 tablet by mouth daily 90 tablet 3     hydroxychloroquine (PLAQUENIL) 200 MG tablet Take 2 tablets (400 mg) by mouth daily 180 tablet 0     insulin glargine (LANTUS) 100 UNIT/ML injection Inject 40 Units Subcutaneous daily (with breakfast)       insulin pen needle (BD MACRK U/F) 32G X 4 MM USE DAILY OR AS DIRECTED 300 each 3     ketoconazole (NIZORAL) 2 % cream Apply topically as needed   3     ketoconazole (NIZORAL) 2 % shampoo Apply topically daily as needed       lisinopril-hydrochlorothiazide (PRINZIDE/ZESTORETIC) 20-25 MG per tablet Take 1 tablet by mouth daily 30 tablet 11     Magnesium Oxide -Mg Supplement 250 MG TABS TK 1 T PO BID. REDUCE IF STOOLS LOOSEN  11     metFORMIN (GLUCOPHAGE-XR) 500 MG 24 hr tablet Take 2 tablets (1,000 mg) by mouth daily (with dinner) 180 tablet 1     metoprolol succinate (TOPROL-XL) 100 MG 24 hr tablet Take 1 tablet (100 mg) by mouth daily 30 tablet 11     metroNIDAZOLE (NORITATE) 1 % cream Apply topically daily       mometasone (NASONEX) 50 MCG/ACT spray Spray 2 sprays into both nostrils daily       montelukast (SINGULAIR) 10 MG tablet Take 1 tablet (10 mg) by mouth At Bedtime 30 tablet 1     Multiple Vitamin (DAILY-GERALD) TABS Take 1 tablet by mouth daily  0     nitroGLYcerin (NITROSTAT) 0.4 MG sublingual tablet Place 1 tablet (0.4 mg) under the tongue every 5 minutes as needed for chest pain 25 tablet 1     nystatin (MYCOSTATIN) 604765 UNITS TABS tablet TK 2 TS PO BID  0     olopatadine (PATANOL) 0.1 % ophthalmic solution Place 1 drop into both eyes 2 times daily as needed for allergies. 5 mL 12     Ondansetron HCl (ZOFRAN PO)        pravastatin (PRAVACHOL) 40 MG tablet Take 1  tablet (40 mg) by mouth daily 30 tablet 11     ranitidine (ZANTAC) 150 MG tablet Take 1 tablet (150 mg) by mouth 2 times daily 180 tablet 1     spironolactone (ALDACTONE) 50 MG tablet Take 1 tablet (50 mg) by mouth daily . Take additional 0.5 tablets by mouth once daily as needed for weight gain. 45 tablet 11     sucralfate (CARAFATE) 1 GM tablet Take 1 tablet (1 g) by mouth 4 times daily as needed 120 tablet 3     traMADol (ULTRAM) 50 MG tablet Take 1 tablet (50 mg) by mouth every 8 hours as needed for moderate pain 60 tablet 3     vitamin D (ERGOCALCIFEROL) 93478 UNIT capsule Take 1 capsule (50,000 Units) by mouth every 7 days Need a Vitamin D level in 2 months. (Patient taking differently: Take 50,000 Units by mouth every 7 days Need a Vitamin D level in 2 months.) 8 capsule 0     azelastine (ASTELIN) 0.1 % spray U 2 SPRAYS IEN BID  0     bacitracin ophthalmic ointment Apply to incision line three times daily. (Patient not taking: Reported on 12/20/2017) 3.5 g 0     BASAGLAR 100 UNIT/ML injection Inject 40 Units Subcutaneous daily (Patient not taking: Reported on 12/20/2017) 12 mL 2     blood glucose monitoring (NO BRAND SPECIFIED) meter device kit Use to test blood sugar 4 X times daily or as directed. (Patient not taking: Reported on 12/20/2017) 1 kit 0     blood glucose monitoring (NO BRAND SPECIFIED) test strip 1 strip by In Vitro route 4 times daily Test as directed. Please dispense three months and three months of refills. Thank you. (Patient not taking: Reported on 12/20/2017) 360 each 3     blood glucose monitoring (ONE TOUCH DELICA) lancets Use to test blood sugars 4 times daily or as directed. (Patient not taking: Reported on 12/20/2017) 4 Box 3     COMPRESSION STOCKINGS 2 each daily (Patient not taking: Reported on 12/20/2017) 4 each 2     COMPRESSION STOCKINGS 2 each daily Apply one 10-15 mmHg compression stocking to each lower extgmierty during the day and remove before bedtime. (Patient not taking:  Reported on 2017) 4 each 2     lidocaine (XYLOCAINE) 5 % ointment Apply 1 g topically 2 times daily as needed for moderate pain (Patient not taking: Reported on 2017) 50 g 5     SYMBICORT 80-4.5 MCG/ACT Inhaler INHALE 2 PUFFS PO BID RINSE AND EXPECTORATE AFTER  3       PAST SURGICAL HISTORY:  Past Surgical History:   Procedure Laterality Date     C ECHO HEART XTHORACIC,COMPLETE, W/O DOPPLER  04    [facility] WNL, EF 60%     C/SECTION, LOW TRANSVERSE           CARDIAC SURGERY      cardiac stent- recent in 16; 4 other stents     DILATION AND CURETTAGE N/A 2016    Procedure: DILATION AND CURETTAGE;  Surgeon: Nahed Butler MD;  Location: [facility] OR     HC UGI ENDOSCOPY W EUS  08    Dr. Pastrana, possible chronic pancreatitis, fatty liver     HERNIA REPAIR  2012    done at [facility]     INSERT INTRAUTERINE DEVICE N/A 2016    Procedure: INSERT INTRAUTERINE DEVICE;  Surgeon: Nahed Butler MD;  Location: [facility] OR     INT UTERINE FIBRIOD EMBOLIZATION  10/29/2014     LAPAROSCOPIC CHOLECYSTECTOMY  08    Dr. Arnol GRUBBS TX, CERVICAL      s/p LEEP     ORBITOTOMY Right 3/15/2016    Procedure: ORBITOTOMY;  Surgeon: Myron Cyr MD;  Location: [facility]     ORBITOTOMY Right 2017    Procedure: ORBITOTOMY;  RIGHT ORBITOTOMY AND BIOPSY;  Surgeon: Charis Holbrook MD;  Location: [facility]     REPAIR PTOSIS Right 2017    Procedure: REPAIR PTOSIS;  RIGHT UPPER LID PTOSIS REPAIR;  Surgeon: Myron Cyr MD;  Location: [facility]     UPPER GI ENDOSCOPY  10/21/08    mild gastritis, Dr. Hidalgo       ALLERGIES     Allergies   Allergen Reactions     Amoxicillin-Pot Clavulanate      Augmentin      Unknown possible hives and edema     Azithromycin      Diatrizoate Other (See Comments)     Pt wants this listed because she is allergic to shellfish      Imitrex [Sumatriptan]      Severe face/neck/chest tightness and flushing side effects      Penicillins Hives     Unknown       Pork Allergy      Stomach pain, cramping, diarrhea, itching, nausea and headaches     Shellfish Allergy Hives and Swelling     Sulfa Drugs Hives and Swelling     Zithromax [Azithromycin Dihydrate] Swelling     Unknown        FAMILY HISTORY:  Family History   Problem Relation Age of Onset     HEART DISEASE Father 50     heart attack     Cerebrovascular Disease Father      Diabetes Father      Hypertension Father      Depression Father      C.A.D. Father      Hypertension Mother      Arthritis Mother      HEART DISEASE Mother      long qt syndrome     Thyroid Disease Mother      C.A.D. Mother      HEART DISEASE Brother 15     MI at 15, 16.      Diabetes Maternal Uncle      Hypertension Maternal Aunt      Hypertension Maternal Uncle      Arthritis Brother      he passed away, had severe arthritis at age 11     Arthritis Maternal Uncle      Eye Disorder Maternal Uncle      cataracts     Neurologic Disorder Sister      migraines     Neurologic Disorder Sister      migraines     Respiratory Son      asthma     Cerebrovascular Disease Maternal Uncle      C.A.D. Brother      Family History Negative Other      neg for RA, SLE     Unknown/Adopted No family hx of      unknown neurological issues in her family, mother was adopted     Skin Cancer No family hx of      No known family hx of skin cancer       SOCIAL HISTORY:  Social History     Social History     Marital status: Single     Spouse name: N/A     Number of children: 1     Years of education: N/A     Occupational History      None      Social History Main Topics     Smoking status: Former Smoker     Packs/day: 0.20     Years: 1.00     Types: Cigarettes     Quit date: 2016     Smokeless tobacco: Never Used     Alcohol use No     Drug use: No     Sexual activity: Not Currently     Other Topics Concern     Parent/Sibling W/ Cabg, Mi Or Angioplasty Before 65f 55m? Yes     Social History Narrative    Balanced Diet - sometimes    Osteoporosis Prevention  "Measures - Dairy servings per day: 2 servings weekly    Regular Exercise -  Yes Describe walking 4 times a week    Dental Exam - NO    Seatbelts used - Yes    Self Breast Exam - Yes    Abuse: Current or Past (Physical, Sexual or Emotional)- No    Do you have any concerns about STD's -  No    Do you feel safe in your environment - No    Guns stored in the home - No    Sunscreen used - Yes    Lipids -  YES - Date: 053102    Glucose -  YES - Date: 012804    Eye Exam - YES - Date: one year ago    Colon Cancer Screening - No    Hemoccults - NO    Pap Test -  YES - Date: 070904, remote history of LEEP    Mammography - YES - Date: last spring, would like to discuss, needs a referral to [facility] [place]    DEXA - NO    Immunizations reviewed and up to date - Yes, last td given in 1997 or 1998       ROS:   Constitutional: No fever, chills, or sweats. No weight gain/loss   ENT: No visual disturbance, ear ache, epistaxis, sore throat  Allergies/Immunologic: Negative.   Respiratory: No cough, hemoptysia  Cardiovascular: As per HPI  GI: No nausea, vomiting, hematemesis, melena, or hematochezia  : No urinary frequency, dysuria, or hematuria  Integument: Negative  Psychiatric: Negative  Neuro: Negative  Endocrinology: Negative   Musculoskeletal: Negative    EXAM:  /79 (BP Location: Left arm, Patient Position: Chair, Cuff Size: Adult Large)  Pulse 72  Ht 1.6 m (5' 3\")  Wt 76.8 kg (169 lb 6.4 oz)  SpO2 98%  BMI 30.01 kg/m2  In general, the patient is a pleasant female in no apparent distress.    HEENT: NC/AT.  PERRLA.  EOMI.  Sclerae white, not injected.  Nares clear.  Pharynx without erythema or exudate.  Dentition intact.    Neck: No adenopathy.  No thyromegaly. Carotids +4/4 bilaterally without bruits.  No jugular venous distension.   Heart: RRR. Normal S1, S2 splits physiologically. No murmur, rub, click, or gallop. The PMI is in the 5th ICS in the midclavicular line. There is no heave.    Lungs: CTA.  No " ronchi, wheezes, rales.  No dullness to percussion.   Abdomen: Soft, nontender, nondistended. No organomegaly.  No bruits.   Extremities: No clubbing, cyanosis, or edema.  The pulses are +4/4 at the radial, brachial, femoral, popliteal, DP, and PT sites bilaterally.  No bruits are noted.  Neurologic: Alert and oriented to person/place/time, normal speech, gait and affect  Skin: No petechiae, purpura or rash.    Labs:  LIPID RESULTS:  Lab Results   Component Value Date    CHOL 124 07/18/2018    HDL 35 (L) 07/18/2018    LDL 62 07/18/2018    TRIG 134 07/18/2018    CHOLHDLRATIO 3.5 07/29/2015    NHDL 89 07/18/2018       LIVER ENZYME RESULTS:  Lab Results   Component Value Date    AST 17 07/18/2018    ALT 23 07/18/2018       CBC RESULTS:  Lab Results   Component Value Date    WBC 10.3 02/16/2018    RBC 4.88 02/16/2018    HGB 12.7 02/16/2018    HCT 38.3 02/16/2018    MCV 79 02/16/2018    MCH 26.0 (L) 02/16/2018    MCHC 33.2 02/16/2018    RDW 12.8 02/16/2018     02/16/2018       BMP RESULTS:  Lab Results   Component Value Date     07/18/2018    POTASSIUM 3.5 07/18/2018    CHLORIDE 105 07/18/2018    CO2 22 07/18/2018    ANIONGAP 10 07/18/2018    GLC 64 (L) 07/18/2018    BUN 12 07/18/2018    CR 1.06 (H) 07/18/2018    GFRESTIMATED 54 (L) 07/18/2018    GFRESTBLACK 66 07/18/2018    KATHY 9.0 07/18/2018        A1C RESULTS:  Lab Results   Component Value Date    A1C 8.4 (H) 11/15/2017       INR RESULTS:  Lab Results   Component Value Date    INR 0.97 08/25/2016    INR 0.98 05/20/2015       Procedures:    Echocardiogram 07-18-18    Global and regional left ventricular function is normal with an EF of 60-65%.  Right ventricular function, chamber size, wall motion, and thickness are  normal.  The inferior vena cava is normal.  No pericardial effusion is present.  There has been no change.  _____________________________________________________________________________  __        Left Ventricle  Global and regional left  ventricular function is normal with an EF of 60-65%.  Left ventricular wall thickness is normal. Left ventricular size is normal.  Left ventricular diastolic function is normal. No regional wall motion  abnormalities are seen.     Right Ventricle  Right ventricular function, chamber size, wall motion, and thickness are  normal.     Atria  Both atria appear normal. The atrial septum is intact as assessed by color  Doppler .     Mitral Valve  The mitral valve is normal. Trace to mild mitral insufficiency is present.        Aortic Valve  Aortic valve is normal in structure and function.     Tricuspid Valve  The tricuspid valve is normal. Trace tricuspid insufficiency is present. The  right ventricular systolic pressure is approximated at 22.8 mmHg plus the  right atrial pressure. Pulmonary artery systolic pressure is normal.     Pulmonic Valve  The pulmonic valve cannot be assessed.     Vessels  The aorta root is normal. The pulmonary artery cannot be assessed. The  inferior vena cava is normal.     Pericardium  No pericardial effusion is present.        Compared to Previous Study  There has been no change.  _____________________________________________________________________________  __  MMode/2D Measurements & Calculations  IVSd: 1.00 cm     LVIDd: 4.1 cm  LVIDs: 2.4 cm  LVPWd: 1.0 cm  FS: 40.5 %  LV mass(C)d: 131.8 grams  LV mass(C)dI: 74.0 grams/m2  asc Aorta Diam: 3.0 cm  LVOT diam: 2.0 cm  LVOT area: 3.2 cm2  LA Volume (BP): 54.0 ml  LA Volume Index (BP): 30.3 ml/m2  RWT: 0.50           Doppler Measurements & Calculations  MV E max ash: 82.0 cm/sec  MV A max ash: 81.5 cm/sec  MV E/A: 1.0  MV dec slope: 465.6 cm/sec2  MV dec time: 0.18 sec  TR max ash: 238.9 cm/sec  TR max P.8 mmHg  E/E' av.9  Lateral E/e': 11.8  Medial E/e': 12.0     _____________________________________________________________________________      Assessment and Plan:     We discussed the results with patient:  We emphasized the  importance of a heart healthy diet.  BP and lipids are well controlled - patient denies angina.  Patient will go back [place] [facility] [place] scan form sinuses:    We gave following written instructions that we discussed with patient.    Medication Changes: None.     Studies Ordered: Echocardiogram in one year.     The results from today include: Echocardiogram and labs.     Please follow up: With Dr. Peace with fasting labs prior.    Milna Peace MD, PhD  Professor of Medicine  Division of Cardiology    CC  Patient Care Team:  Lauren Francis RN as PCP - General  Milan Peace MD as MD (Cardiology)  Majo Mckinnon MD as MD (Rheumatology)  Jas Vernon MD as MD (Ophthalmology)  Jas Vernon MD as Referring Physician (Ophthalmology)  Charis Holbrook MD as Resident (Ophthalmology)  DARLING THOMPSON

## 2018-07-25 ENCOUNTER — RADIANT APPOINTMENT (OUTPATIENT)
Dept: ULTRASOUND IMAGING | Facility: CLINIC | Age: 53
End: 2018-07-25
Attending: FAMILY MEDICINE
Payer: COMMERCIAL

## 2018-07-25 ENCOUNTER — OFFICE VISIT (OUTPATIENT)
Dept: FAMILY MEDICINE | Facility: CLINIC | Age: 53
End: 2018-07-25
Payer: COMMERCIAL

## 2018-07-25 VITALS
HEART RATE: 84 BPM | RESPIRATION RATE: 16 BRPM | WEIGHT: 164 LBS | BODY MASS INDEX: 29.05 KG/M2 | SYSTOLIC BLOOD PRESSURE: 123 MMHG | DIASTOLIC BLOOD PRESSURE: 85 MMHG

## 2018-07-25 DIAGNOSIS — M79.7 FIBROMYALGIA: ICD-10-CM

## 2018-07-25 DIAGNOSIS — E04.9 GOITER: ICD-10-CM

## 2018-07-25 DIAGNOSIS — M19.90 INFLAMMATORY ARTHRITIS: ICD-10-CM

## 2018-07-25 DIAGNOSIS — M48.02 CERVICAL STENOSIS OF SPINAL CANAL: ICD-10-CM

## 2018-07-25 DIAGNOSIS — E11.9 DM TYPE 2, GOAL HBA1C 7%-8% (H): Primary | ICD-10-CM

## 2018-07-25 ASSESSMENT — ENCOUNTER SYMPTOMS
FATIGUE: 1
POLYPHAGIA: 0
INCREASED ENERGY: 0
DIZZINESS: 1
SKIN CHANGES: 0
BRUISES/BLEEDS EASILY: 1
SEIZURES: 0
NAIL CHANGES: 1
MUSCLE CRAMPS: 1
HOARSE VOICE: 0
RECTAL PAIN: 0
EYE REDNESS: 1
TROUBLE SWALLOWING: 0
WEIGHT GAIN: 1
FLANK PAIN: 1
MYALGIAS: 1
SINUS CONGESTION: 1
TASTE DISTURBANCE: 0
STIFFNESS: 1
NIGHT SWEATS: 1
NECK MASS: 0
NECK PAIN: 1
EYE WATERING: 0
POLYDIPSIA: 0
PARALYSIS: 0
DIFFICULTY URINATING: 0
JAUNDICE: 0
ALTERED TEMPERATURE REGULATION: 1
DECREASED APPETITE: 0
MEMORY LOSS: 0
HEARTBURN: 1
BLOOD IN STOOL: 0
ARTHRALGIAS: 1
NERVOUS/ANXIOUS: 1
WEAKNESS: 1
DISTURBANCES IN COORDINATION: 1
SMELL DISTURBANCE: 0
EYE PAIN: 1
WEIGHT LOSS: 0
HEADACHES: 1
POOR WOUND HEALING: 1
SORE THROAT: 0
TINGLING: 1
BOWEL INCONTINENCE: 0
BACK PAIN: 1
CHILLS: 0
DYSURIA: 1
HEMATURIA: 1
NUMBNESS: 1
SPEECH CHANGE: 0
ABDOMINAL PAIN: 1
JOINT SWELLING: 1
DECREASED CONCENTRATION: 0
SINUS PAIN: 1
DOUBLE VISION: 0
LOSS OF CONSCIOUSNESS: 0
INSOMNIA: 0
MUSCLE WEAKNESS: 1
DEPRESSION: 0
CONSTIPATION: 0
FEVER: 0
VOMITING: 1
DIARRHEA: 1
SWOLLEN GLANDS: 1
NAUSEA: 1
TREMORS: 0
EYE IRRITATION: 1
HALLUCINATIONS: 0
BLOATING: 1
PANIC: 0

## 2018-07-25 NOTE — MR AVS SNAPSHOT
After Visit Summary   7/25/2018    Janine Cornell    MRN: [id number]           Patient Information     Date Of Birth          1965        Visit Information        Provider Department      7/25/2018 3:20 PM Kash Solano MD [facility]        Today's Diagnoses     DM type 2, goal HbA1c 7%-8% (H)    -  1    Fibromyalgia        Inflammatory arthritis        Cervical stenosis of spinal canal        Goiter          Care Instructions    Primary Care Center Phone Number [phone]  Primary Care Center Medication Refill Request Information:  * Please contact your pharmacy regarding ANY request for medication refills.  ** [facility] Prescription Fax = [phone]  * Please allow 3 business days for routine medication refills.  * Please allow 5 business days for controlled substance medication refills.     Primary Care Center Test Result notification information:  *You will be notified with in 7-10 days of your appointment day regarding the results of your test.  If you are on MyChart you will be notified as soon as the provider has reviewed the results and signed off on them.      The [facility] Clinic now has the Shingrix vaccine available.     However, not all insurance carriers cover the entire cost of the Shingrix vaccine if the vaccine is administered at a primary clinic.     Prior to receiving the vaccine, we recommend that you call your insurance carrier and ask them the following questions:     * Does my insurance cover the Shingrix vaccine and administration of the vaccine?   * What is my co-pay or deductible for the vaccine?   * Is there a cost difference if I receive the vaccine at my doctors office or at a pharmacy?     Unfortunately, the clinic cannot determine your insurance benefits.  Please call your insurance provider prior to scheduling an appointment to receive the Shingrix vaccine.    If you decide to receive your vaccine at the Primary Care Clinic please call  [phone] and request a nurse only appointment for the Shingrix vaccine.                   Follow-ups after your visit        Additional Services     PHYSICAL THERAPY REFERRAL       [place] [facility] eval/treat                  Your next 10 appointments already scheduled     Jul 25, 2018  6:00 PM CDT   US THYROID with UCUS3   [facility] ([facility])    [address]  1st Floor  [place] [address] [phone]           Please bring a list of your medicines (including vitamins, minerals and over-the-counter drugs). Also, tell your doctor about any allergies you may have. Wear comfortable clothes and leave your valuables at home.  You do not need to do anything special to prepare for your exam.  Please call the Imaging Department at your exam site with any questions.            Sep 07, 2018  3:00 PM CDT   (Arrive by 2:45 PM)   Return Visit with Majo Mckinnon MD   [facility] ([facility])    [address]  [address]  [place] [address] [phone]              Future tests that were ordered for you today     Open Future Orders        Priority Expected Expires Ordered    US Thyroid Routine  7/25/2019 7/25/2018            Who to contact     Please call your clinic at [phone] to:    Ask questions about your health    Make or cancel appointments    Discuss your medicines    Learn about your test results    Speak to your doctor            Additional Information About Your Visit        [employer] Information     [employer] gives you secure access to your electronic health record. If you see a primary care provider, you can also send messages to your care team and make appointments. If you have questions, please call your primary care clinic.  If you do not have a primary care provider, please call [phone] and they will assist you.      [employer] is an electronic gateway that provides easy, online access  to your medical records. With [employer], you can request a clinic appointment, read your test results, renew a prescription or communicate with your care team.     To access your existing account, please contact your [facility] or call [phone] for assistance.        Care EveryWhere ID     This is your Care EveryWhere ID. This could be used by other organizations to access your [facility] medical records  [id number]        Your Vitals Were     Pulse Respirations Breastfeeding? BMI (Body Mass Index)          84 16 No 29.05 kg/m2         Blood Pressure from Last 3 Encounters:   07/25/18 123/85   07/18/18 121/79   06/15/18 119/82    Weight from Last 3 Encounters:   07/25/18 74.4 kg (164 lb)   07/18/18 76.8 kg (169 lb 6.4 oz)   06/15/18 75.1 kg (165 lb 8 oz)              We Performed the Following     PHYSICAL THERAPY REFERRAL          Today's Medication Changes          These changes are accurate as of 7/25/18  4:34 PM.  If you have any questions, ask your nurse or doctor.               These medicines have changed or have updated prescriptions.        Dose/Directions    vitamin D 97036 UNIT capsule   Commonly known as:  ERGOCALCIFEROL   This may have changed:  additional instructions   Used for:  Vitamin D deficiency        Dose:  18069 Units   Take 1 capsule (50,000 Units) by mouth every 7 days Need a Vitamin D level in 2 months.   Quantity:  8 capsule   Refills:  0                Primary Care Provider    Lauren Francis, RN       No address on file        Equal Access to Services     [facility] : Hadii frederick June, waaxda luemmy, qaybta kaalmada alex, gagandeep vincent. So [facility] [phone].    ATENCIÓN: Si habla español, tiene a burch disposición servicios gratuitos de asistencia lingüística. Llame al [phone].    We comply with applicable federal civil rights laws and [place] laws. We do not discriminate on the basis of race,  color, national origin, age, disability, sex, sexual orientation, or gender identity.            Thank you!     Thank you for choosing [facility]  for your care. Our goal is always to provide you with excellent care. Hearing back from our patients is one way we can continue to improve our services. Please take a few minutes to complete the written survey that you may receive in the mail after your visit with us. Thank you!             Your Updated Medication List - Protect others around you: Learn how to safely use, store and throw away your medicines at www.disposemymeds.org.          This list is accurate as of 7/25/18  4:34 PM.  Always use your most recent med list.                   Brand Name Dispense Instructions for use Diagnosis    acetaminophen 325 MG tablet    TYLENOL    250 tablet    Take 1-2 tablets (325-650 mg) by mouth every 6 hours as needed for pain (headache)    Other chronic pain, Headache(784.0)       AEROSPAN 80 MCG/ACT Aers oral inhaler   Generic drug:  flunisolide HFA      INHALE 2 PUFFS PO BID.  RINSE MOUTH AFTERWARDS        * albuterol 108 (90 Base) MCG/ACT Inhaler    PROAIR HFA/PROVENTIL HFA/VENTOLIN HFA    3 Inhaler    Inhale 2 puffs into the lungs every 6 hours as needed for shortness of breath / dyspnea or wheezing        * albuterol (2.5 MG/3ML) 0.083% neb solution      INL 1 VIAL VIA NEBULIZATION Q 4 TO 6 HOURS PRN        ASPIRIN LOW DOSE 81 MG EC tablet   Generic drug:  aspirin      Take 81 mg by mouth daily        azelastine 0.1 % spray    ASTELIN     U 2 SPRAYS IEN BID        bacitracin ophthalmic ointment     3.5 g    Apply to incision line three times daily.    Postoperative eye state       * insulin glargine 100 UNIT/ML injection    LANTUS     Inject 40 Units Subcutaneous daily (with breakfast)        * BASAGLAR 100 UNIT/ML injection     12 mL    Inject 40 Units Subcutaneous daily    Type 2 diabetes mellitus without complication (H)       blood glucose monitoring  lancets     4 Box    Use to test blood sugars 4 times daily or as directed.    Type 2 diabetes, HbA1c goal < 7% (H)       blood glucose monitoring meter device kit    no brand specified    1 kit    Use to test blood sugar 4 X times daily or as directed.    Type 2 diabetes, HbA1c goal < 7% (H)       blood glucose monitoring test strip    no brand specified    360 each    1 strip by In Vitro route 4 times daily Test as directed. Please dispense three months and three months of refills. Thank you.    Type 2 diabetes, HbA1c goal < 7% (H)       clopidogrel 75 MG tablet    PLAVIX    30 tablet    Take 1 tablet (75 mg) by mouth daily        * COMPRESSION STOCKINGS     4 each    2 each daily    Bilateral lower extremity edema, Venous incompetence       * COMPRESSION STOCKINGS     4 each    2 each daily Apply one 10-15 mmHg compression stocking to each lower extgmierty during the day and remove before bedtime.    Venous incompetence, Bilateral lower extremity edema       cyclobenzaprine 10 MG tablet    FLEXERIL    180 tablet    Take 1 tablet (10 mg) by mouth 2 times daily as needed for muscle spasms    Fibromyalgia       DAILY-GERALD Tabs      Take 1 tablet by mouth daily        desonide 0.05 % cream    DESOWEN     Apply topically 2 times daily        dicyclomine 20 MG tablet    BENTYL    60 tablet    Take 1 tablet (20 mg) by mouth 4 times daily as needed    Other irritable bowel syndrome       diphenhydrAMINE 25 MG capsule    BENADRYL     TK 1 TO 2 CS PO QHS        EPIPEN 2-RIKY 0.3 MG/0.3ML injection 2-pack   Generic drug:  EPINEPHrine      INJECT 0.3 MG INTO THE MUSCLE PRF ANAPHYALAXIS        ferrous gluconate 324 (38 Fe) MG tablet    FERGON    180 tablet    Take 1 tablet (324 mg) by mouth 2 times daily    AILEEN (iron deficiency anemia)       fexofenadine 180 MG tablet    ALLEGRA    90 tablet    Take 1 tablet by mouth daily as needed.    Chronic rhinitis       fluocinolone 0.01 % solution    SYNALAR     Apply topically daily as  needed        fluocinonide 0.05 % solution    LIDEX    60 mL    Apply topically 2 times daily To areas of itch on the scalp as needed.    Telogen effluvium       folic acid 1 MG tablet    FOLVITE    90 tablet    Take 1 tablet by mouth daily    Inflammatory arthritis       hydroxychloroquine 200 MG tablet    PLAQUENIL    180 tablet    Take 2 tablets (400 mg) by mouth daily    Inflammatory arthritis       insulin pen needle 32G X 4 MM    BD MARKC U/F    300 each    USE DAILY OR AS DIRECTED    Type 2 diabetes, HbA1c goal < 7% (H)       * ketoconazole 2 % shampoo    NIZORAL     Apply topically daily as needed        * ketoconazole 2 % cream    NIZORAL     Apply topically as needed        lidocaine 5 % ointment    XYLOCAINE    50 g    Apply 1 g topically 2 times daily as needed for moderate pain    Fibromyalgia, Rheumatoid arthritis involving both wrists with positive rheumatoid factor (H)       lisinopril-hydrochlorothiazide 20-25 MG per tablet    PRINZIDE/ZESTORETIC    30 tablet    Take 1 tablet by mouth daily    Hypertension, unspecified type       Magnesium Oxide -Mg Supplement 250 MG tablet      TK 1 T PO BID. REDUCE IF STOOLS LOOSEN        metFORMIN 500 MG 24 hr tablet    GLUCOPHAGE-XR    180 tablet    Take 2 tablets (1,000 mg) by mouth daily (with dinner)    Type 2 diabetes mellitus not at goal (H)       metoprolol succinate 100 MG 24 hr tablet    TOPROL XL    30 tablet    Take 1 tablet (100 mg) by mouth daily    Benign essential hypertension       metroNIDAZOLE 1 % cream    NORITATE     Apply topically daily        mometasone 50 MCG/ACT spray    NASONEX     Spray 2 sprays into both nostrils daily        montelukast 10 MG tablet    SINGULAIR    30 tablet    Take 1 tablet (10 mg) by mouth At Bedtime    Uncomplicated asthma, unspecified asthma severity       nitroGLYcerin 0.4 MG sublingual tablet    NITROSTAT    25 tablet    Place 1 tablet (0.4 mg) under the tongue every 5 minutes as needed for chest pain    NSTEMI  (non-ST elevated myocardial infarction) (H)       nystatin 833230 units Tabs tablet    MYCOSTATIN     TK 2 TS PO BID        olopatadine 0.1 % ophthalmic solution    PATANOL    5 mL    Place 1 drop into both eyes 2 times daily as needed for allergies.    Chronic rhinitis       pravastatin 40 MG tablet    PRAVACHOL    30 tablet    Take 1 tablet (40 mg) by mouth daily    Coronary artery disease involving native heart without angina pectoris, unspecified vessel or lesion type       ranitidine 150 MG tablet    ZANTAC    180 tablet    Take 1 tablet (150 mg) by mouth 2 times daily    Gastritis       spironolactone 50 MG tablet    ALDACTONE    45 tablet    Take 1 tablet (50 mg) by mouth daily . Take additional 0.5 tablets by mouth once daily as needed for weight gain.    Hypertension goal BP (blood pressure) < 140/90       sucralfate 1 GM tablet    CARAFATE    120 tablet    Take 1 tablet (1 g) by mouth 4 times daily as needed    Abdominal pain, epigastric       SYMBICORT 80-4.5 MCG/ACT Inhaler   Generic drug:  budesonide-formoterol      INHALE 2 PUFFS PO BID RINSE AND EXPECTORATE AFTER        traMADol 50 MG tablet    ULTRAM    60 tablet    Take 1 tablet (50 mg) by mouth every 8 hours as needed for moderate pain    Undifferentiated connective tissue disease (H)       TUMS 500 MG chewable tablet   Generic drug:  calcium carbonate      Take 3-4 chew tab by mouth daily as needed.        vitamin D 90509 UNIT capsule    ERGOCALCIFEROL    8 capsule    Take 1 capsule (50,000 Units) by mouth every 7 days Need a Vitamin D level in 2 months.    Vitamin D deficiency       ZOFRAN PO           * Notice:  This list has 8 medication(s) that are the same as other medications prescribed for you. Read the directions carefully, and ask your doctor or other care provider to review them with you.

## 2018-07-25 NOTE — NURSING NOTE
Chief Complaint   Patient presents with     Physical     pt is here for an annual physical       Tess Reyes CMA at 3:26 PM on 7/25/2018

## 2018-07-25 NOTE — PATIENT INSTRUCTIONS
Primary Care Center Phone Number [phone]  Primary [facility] Medication Refill Request Information:  * Please contact your pharmacy regarding ANY request for medication refills.  ** [facility] Prescription Fax = [phone]  * Please allow 3 business days for routine medication refills.  * Please allow 5 business days for controlled substance medication refills.     Primary Care Center Test Result notification information:  *You will be notified with in 7-10 days of your appointment day regarding the results of your test.  If you are on MyChart you will be notified as soon as the provider has reviewed the results and signed off on them.      The [facility] now has the Shingrix vaccine available.     However, not all insurance carriers cover the entire cost of the Shingrix vaccine if the vaccine is administered at a primary clinic.     Prior to receiving the vaccine, we recommend that you call your insurance carrier and ask them the following questions:     * Does my insurance cover the Shingrix vaccine and administration of the vaccine?   * What is my co-pay or deductible for the vaccine?   * Is there a cost difference if I receive the vaccine at my doctors office or at a pharmacy?     Unfortunately, the clinic cannot determine your insurance benefits.  Please call your insurance provider prior to scheduling an appointment to receive the Shingrix vaccine.    If you decide to receive your vaccine at the Primary Care Clinic please call [phone] and request a nurse only appointment for the Shingrix vaccine.

## 2018-08-29 ENCOUNTER — TRANSFERRED RECORDS (OUTPATIENT)
Dept: HEALTH INFORMATION MANAGEMENT | Facility: CLINIC | Age: 53
End: 2018-08-29

## 2018-09-04 DIAGNOSIS — E78.5 HYPERLIPIDEMIA LDL GOAL <70: Primary | ICD-10-CM

## 2018-09-04 DIAGNOSIS — I10 HTN (HYPERTENSION): ICD-10-CM

## 2018-09-05 RX ORDER — ASPIRIN 81 MG/1
81 TABLET, COATED ORAL DAILY
Qty: 90 TABLET | Refills: 3 | Status: SHIPPED | OUTPATIENT
Start: 2018-09-05 | End: 2019-07-11

## 2018-09-05 NOTE — TELEPHONE ENCOUNTER
Date: 9/5/2018    Time of Call: 8:35 AM     Diagnosis:  Dyslipidemia, HTN, s/p NSTEMI     [ TORB ] Ordering provider: Dr Peace  Order:   - Continue ASA 81 qd     Order received by: Selena Underwood LPN      Follow-up/additional notes: Per Dr Peace note below.  Prescription sent to preferred pharmacy per Dr Peace request.    FW: ASA 81  Received: Yesterday       Milan Peace MD Duncan, Lindsay, LPN                     Yes     Please make a prescription     I think that in state of [place] - prescription of aspirin 81 mg/d gets fully reimbursed     Thanks     DD            Previous Messages       ----- Message -----      From: Selena Underwood LPN      Sent: 9/4/2018   3:16 PM        To: Milan Peace MD   Subject: ASA 81                                           Dr Peace,     Pt has been on ASA 81 previously, but is now requesting a prescription.  Please confirm if that is acceptable.     Selena

## 2018-09-07 ENCOUNTER — OFFICE VISIT (OUTPATIENT)
Dept: RHEUMATOLOGY | Facility: CLINIC | Age: 53
End: 2018-09-07
Attending: INTERNAL MEDICINE
Payer: COMMERCIAL

## 2018-09-07 VITALS
OXYGEN SATURATION: 99 % | HEART RATE: 73 BPM | WEIGHT: 157.2 LBS | HEIGHT: 63 IN | BODY MASS INDEX: 27.85 KG/M2 | TEMPERATURE: 98.1 F | SYSTOLIC BLOOD PRESSURE: 112 MMHG | DIASTOLIC BLOOD PRESSURE: 72 MMHG

## 2018-09-07 DIAGNOSIS — I77.82 ANCA-ASSOCIATED VASCULITIS (H): Primary | ICD-10-CM

## 2018-09-07 DIAGNOSIS — Z51.81 ENCOUNTER FOR MEDICATION MONITORING: ICD-10-CM

## 2018-09-07 PROCEDURE — G0463 HOSPITAL OUTPT CLINIC VISIT: HCPCS | Mod: ZF

## 2018-09-07 ASSESSMENT — PAIN SCALES - GENERAL: PAINLEVEL: SEVERE PAIN (7)

## 2018-09-07 NOTE — PROGRESS NOTES
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 Cornell is a 49 yo AAF who was referred to our clinic for evaluation and management of her joint pain in setting of positive RF 26.    Her joint symptoms first started more than a year ago, but over last 6-8 months fluctuating some good and bad days . All her joints are involved. Over last 5 months, hands became swollen, warm and painful. Tylenol does not help. Ketoprofen did not help. Was told to avoid NSAIDs given ACS. Reports AM/PM stiffness x 2-3 hr, can't make full fist when wakes up in AM.    She feels tired all the time. Reports worsening hair loss and unchanged  facial rash. Gets red sore flaky rash over cheeks across her face which is intermittent diagnosed Rosacea and is prescribed metronidazole gel which she has not started using. Reports occasional oral ulcers. Hands feel cold with red discoloration last winter. Has occasional dysphagia to both solids and liquid. Has dry eyes, is using OTC allergy eyedrops. Has chronic abdominal pain. Has h/o pancreatitis. Sometime has anxiety. Occasionally has numbness, tingling in fingers/toes. Has back and spine issues, her whole back and neck hurts, different areas at different time. She is wondering if she has FMS. Has hard time sleeping, has never been diagnosed with BRENNA. She does not know if he snores. She was found to have slightly positive RF in 2/2013. Has microcytic anemia.     Her arthralgia gets worse with drop in temperature. Reports body ache. Activity makes her pain worse. Her joint swelling isintermittent. Her body pain and joint pain is the same. Reports AM stiffness x 3 hr.    She has been doing acupuncture x few months and it is helping with her pain. She thinks that the combination of plaquenil and acupuncture is helpful but overall she notice  30% in her symptoms relief.     Takes tylenol and tramadol on occasion for pain.    She decided to use different formulation of metformin which helped with her abdominal pain. I referred her to GI for evaluation of elevated lipase and abdominal pain. She decided to see GI in the future.       She is on  mg qd since 5/2014, tolerates it well and it helps her some. Denies taking any gabapentin due to chest pain and headches. She has had referral her for water aerobics, but she can't begin until she's healed from recent surgery in 10/2014. She thinks HCQ is helping but not enough to control all her pain, reports 2-3 hr of AM stiffness with ongoing diffuse arthralgia/myalgia along with intermittent swelling of hands. She does see her acupuncture person and it helps with her pain, has not started water aerobics yet. Has got her flu shot.       10/2015: Reports having pleuritic CP in 3/2015, was prescribed Lidoderm patches first. It did not help. Took prednisone (?dose) by her own, pain got better but it recurred off prednisone and gradually got worse to the point that she could not stand the pain anymore, was seen in ER in 5/2015, was treated with medrol dose pack which helped. Reports pain over hips, knees, ankles and fingers. Pain gets worse with walking. Reports myalgia, swelling of the arms. Pain over neck, shoulders. Has AM stiffness x 3-4 hr. Fingers swell up and become painful. Can't remember if steroid helped with joint pain. She had headaches, severe CP when she took tramadol and gabapentin and stopped taking them, sx resolved but she can't tell which one caused SEs but thinks that probably it was gabapentin. She has good days and tries to be more active but activity aggravates the pain. Headaches are intermittent. HCQ helps some not enough to control all the pain. No HCQ SEs. Had eye exam around July 2015. Flexeril helps but PCP wants to change flexeril to zanaflex, reporting that she is NOT comfortable  with the change. Acupuncture still helps an she continued to  do that. Concerned about fat pads she has in supraclavicular area, has h/o thyroid nodules. Continues having hair loss, takes iron for iron deficiency.     2/2016: She has 2 major complaints today, increased joint pain/swelling in hands/feet and recent Dx of optic neuritis in R eye, reports having similar problem about 20 yr ago, now recurred. Has a spot in R eye vision x long term, was seen by ophthalmology few days ago and was told to have optic neuritis. Gets joint pain, muscle pain. Can't  objects, hands are swollen. Knees, hips and ankles are painful. Reports more arthralgia. AM stiffness/ pain is 3-4 hr. She wants me to talk to her ophthalmologist directly.      She had botox inj for migraines which did not help her. She is going to have angiogram next wk, had to take more nitro for CP recently.       4/29/2016: She reports being on steroid taper x 3 since last visit. Reportedly, had orbit MRI, it showed swelling/inflamamtion of R lacrimal glands. She had painful swelling of her R eye, cause her headaches. Botox inj made her headaches worse. She is being dealing with this since 1/2016, with severe headaches and pain/swelling around R eye. Had biopsy in 3/2016. She is frustrated as prednisone causes her weight gain and her BG is difficult to control on it.    5/2016: At last visit, was prescribed MTX 10 mg po qwk to take along with FA 1 mg qd. She decided not to take MTX, is afraid of side effects, believes all these medications would harm her. She also believes that botox caused her inflammation around R eye. No major flare up of per-orbital inflamamtion since last visit but continues to have swelling around her R eye.      11/2016: Reports diffuse body ache, arthralgia, myalgia especially with weather change. No major flare up of campos-orbital inflammation but her face/around R eye swells up from time to time. Reports 2 episodes of CP over past  2 mo, nitro sometimes helps and sometimes does not help. She has asthma and costochondritis and she has hard time to distinguish the origin of pain. Sometimes knees and fingers swell up. Her stiffness is sometimes all day.    4/2017:  Reports having sinusitis since last visit. Her R eye is dry and is using eyedrops to keep it moist. Reports having pain in ears. Was treated with antibiotics by PCP, it got better then it got worse. Reports catching infections easily. Then started having pressure over eyes with pain/headaches (exact start date is unknown). Pain gradually got worse and became persistent. Had sinus CT.     4/7/2017: Having a flare up of swelling, pain around her R orbit. Has not tried MTX yet.      5/2017: On MTX 10 mg po qwk since 4/7/2017, reports increased stomach pain and nausea and new headaches since start of MTX and it's not helping. Pain/swelling around R orbit is worse.    6/2017: She finished prednisone taper prescribed at last visit, R campos-orbital swelling significantly improved. 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    2/2018: R campos-orbital swelling has improved. Repeat R lacrimal gland biopsy in 8/2017 showed non specific inflammation with no lymphoma. She is off steroid. Did not tolerate AZA due to N/V and abdominal pain.      Is back with recurrence of R campos-orbital sweling x past 2 months. Pain really got worse over past 3wk, requries       Today: Declined ritiximab at last visit, lost f/u since 2/2018. Went to [place] and was seen on 8/29 by Dr. 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 Reportedly he ordered labs and recommended surgery since the inflammation of the R eye is back and is pushing the eye down. Janine took some prednsione 30 mg every day few days a go for pain.    Her records were  reviewed.        Component      Latest Ref Rng 2/28/2013 2/28/2013          12:14 PM 12:14 PM   WBC      4.0 - 11.0 10e9/L     RBC Count      3.8 - 5.2 10e12/L     Hemoglobin      11.7 - 15.7 g/dL     Hematocrit      35.0 - 47.0 %     MCV      78 - 100 fl     MCH      26.5 - 33.0 pg     MCHC      31.5 - 36.5 g/dL     RDW      10.0 - 15.0 %     Platelet Count      150 - 450 10e9/L     Specimen Description           Lyme Screen IgG and IgM           Vitamin D Deficiency screening      30 - 75 ug/L     Ferritin      10 - 300 ng/mL     Sed Rate      0 - 20 mm/h     ALLI Screen by EIA      <1.0     Rheumatoid Factor      0 - 14 IU/mL     CK Total      30 - 225 U/L  78   Uric Acid      2.5 - 7.5 mg/dL 6.7      Component      Latest Ref Rng 2/28/2013          12:14 PM   WBC      4.0 - 11.0 10e9/L    RBC Count      3.8 - 5.2 10e12/L    Hemoglobin      11.7 - 15.7 g/dL    Hematocrit      35.0 - 47.0 %    MCV      78 - 100 fl    MCH      26.5 - 33.0 pg    MCHC      31.5 - 36.5 g/dL    RDW      10.0 - 15.0 %    Platelet Count      150 - 450 10e9/L    Specimen Description       Serum   Lyme Screen IgG and IgM       Test value: <0.75....Interpretation: Negative....If you highly suspect Lyme . . .   Vitamin D Deficiency screening      30 - 75 ug/L    Ferritin      10 - 300 ng/mL    Sed Rate      0 - 20 mm/h    ALLI Screen by EIA      <1.0    Rheumatoid Factor      0 - 14 IU/mL    CK Total      30 - 225 U/L    Uric Acid      2.5 - 7.5 mg/dL      Component      Latest Ref Rng 2/28/2013 2/28/2013 2/28/2013 2/28/2013          12:14 PM 12:14 PM 12:14 PM 12:14 PM   WBC      4.0 - 11.0 10e9/L       RBC Count      3.8 - 5.2 10e12/L       Hemoglobin      11.7 - 15.7 g/dL       Hematocrit      35.0 - 47.0 %       MCV      78 - 100 fl       MCH      26.5 - 33.0 pg       MCHC      31.5 - 36.5 g/dL       RDW      10.0 - 15.0 %       Platelet Count      150 - 450 10e9/L       Specimen Description             Lyme Screen IgG and IgM              Vitamin D Deficiency screening      30 - 75 ug/L       Ferritin      10 - 300 ng/mL    10   Sed Rate      0 - 20 mm/h   23 (H)    ALLI Screen by EIA      <1.0  <1.0 . . .     Rheumatoid Factor      0 - 14 IU/mL 26 (H)      CK Total      30 - 225 U/L       Uric Acid      2.5 - 7.5 mg/dL         Component      Latest Ref Rng 2/28/2013 2/28/2013          12:14 PM 12:14 PM   WBC      4.0 - 11.0 10e9/L 14.1 (H)    RBC Count      3.8 - 5.2 10e12/L 4.55    Hemoglobin      11.7 - 15.7 g/dL 10.7 (L)    Hematocrit      35.0 - 47.0 % 33.3 (L)    MCV      78 - 100 fl 73 (L)    MCH      26.5 - 33.0 pg 23.5 (L)    MCHC      31.5 - 36.5 g/dL 32.1    RDW      10.0 - 15.0 % 18.1 (H)    Platelet Count      150 - 450 10e9/L 407    Specimen Description           Lyme Screen IgG and IgM           Vitamin D Deficiency screening      30 - 75 ug/L  32   Ferritin      10 - 300 ng/mL     Sed Rate      0 - 20 mm/h     ALLI Screen by EIA      <1.0     Rheumatoid Factor      0 - 14 IU/mL     CK Total      30 - 225 U/L     Uric Acid      2.5 - 7.5 mg/dL          MRI CERVICAL SPINE WITHOUT CONTRAST 4/3/2013 12:47 PM    HISTORY: Cervicalgia. Degeneration of cervical intervertebral disc.  MRI cervical spine. Evaluate bilateral supraclavicular lymph nodes.  Clinical enlargement.    TECHNIQUE: Multiplanar multisequence MRI of the cervical spine  without gadolinium contrast.    COMPARISON: None.    FINDINGS: The patient has a developmentally small central canal.  Images of the cervical cord reveals small areas of T2 hyperintensity  within the cervical cord. There is a small area of high signal  intensity at the C1 level. There is also a small area of high signal  intensity at the C6-C7 level. The area of high signal at C6-C7 is  located at an area of central spinal stenosis. This may be due to  myelomalacia. The area of high signal at C1-C2 is indeterminate.    C2-C3: Normal disc, facet joints, spinal canal and neural foramina.    C3-C4: Normal disc,  facet joints, spinal canal and neural foramina.    C4-C5: Normal disc, facet joints, spinal canal and neural foramina.    C5-C6: There is degeneration of the disc. There is a mild annular  disc bulge. The central canal is mild-moderately narrowed. The  residual AP diameter of the central spinal canal measures  approximately 9 mm.    C6-C7: There is degeneration of the disc. There is loss of disc space  height. There is a diffuse annular disc bulge. There are associated  posterior osteophytes. There are severe central spinal stenosis at  this level. The residual AP diameter of the central spinal canal is  only about 6 mm. There is significant flattening of the cord. There  is high signal in the cord at this level consistent with  myelomalacia. There is moderate to severe right foraminal stenosis  due to and uncinate spur.    C7-T1: Normal disc, facet joints, spinal canal and neural foramina.            Result Impression       IMPRESSION:  1. Severe central spinal stenosis at C6-C7 due to a developmentally  small canal and due to a bulging disc and associated posterior  osteophyte. There is flattening of the cord at this level and high  signal in the cord at this level consistent with myelomalacia.  2. There is a small, indeterminate 2-3 mm area of high signal  intensity in the cord at the C1 level. This could be due to gliosis  secondary to a previous infectious or inflammatory process.  Demyelinating disease could also have a similar appearance. Clinical  correlation suggested.    GAIL MORE MD     MRI LEFT UPPER EXTREMITY NON-JOINT WITHOUT CONTRAST, MRI RIGHT UPPER  EXTREMITY NON-JOINT WITHOUT CONTRAST April 3, 2013 1:32 PM    HISTORY: Cervicalgia. Degeneration of cervical intervertebral disc.    TECHNIQUE: Multiplanar, multisequence imaging of the brachial plexus  was performed without gadolinium contrast enhancement.    COMPARISON: None.    FINDINGS: No mass lesions are seen. No inflammatory process  is  identified. The roots, trunks, branches, and divisions of both the  right and left brachial plexus appear normal. No adenopathy is seen.  The anterior scalene muscles and the middle scalene muscles appear  normal. Nodules are seen within the thyroid gland. The largest nodule  is seen in the left lobe of the thyroid. This measures about 1.8 cm  in diameter.            Result Impression       IMPRESSION:  1. Both the right and left brachial plexus regions appear normal.  2. Thyroid nodules. The largest nodule is in the left lobe of the  thyroid. It measures about 1.8 cm in diameter.       XR WRIST BILATERAL G/E 3 VIEWS    Narrative:        EXAMINATION:  1. Each hand 3 views  2. Each wrist 3 views     DATE: 5/1/2013     HISTORY: Arthropathy with concern for rheumatoid.     FINDINGS: 3 views of each hand and 3 views of each wrist are obtained  without prior for comparison. Alignment is normal. There is no  fracture or acute bone abnormality. No distinct erosions are seen.  Some spurring of the distal radial ulnar joint is noted on the right.       Impression:     IMPRESSION:  1. No evidence of an inflammatory arthropathy involving either hand  or wrist.     VIELKA GUEVARA MD         XR FOOT BILATERAL G/E 3 VIEWS    Narrative:        EXAMINATION:  1. Each foot 3 views  2. Each ankle 3 views  3. Sacroiliac joint series     DATE: 5/1/2013     HISTORY: Arthropathy; concern for rheumatoid.     FINDINGS: No prior for comparison.     A frontal and bilateral oblique evaluation of the sacroiliac joints  shows no malalignment. Some patchy sclerosis is identified along the  central aspect of both sacroiliac joints, which is favored to be  degenerative. No distinct erosions are seen. The visualized portion  of the hip joint spaces appear maintained, with no erosive changes  identified. Mild endplate spurring is noted in the lower lumbar  spine.     3 views of each foot and 3 views of each ankle show no evidence of  acute fracture  or dislocation. The metatarsal phalangeal,  tarsometatarsal and intertarsal joint spaces appear maintained. No  erosions are identified. There is no sign of acroosteolysis. The  ankle mortise and talar dome are intact bilaterally. Minimal spurring  is noted along the tip of the medial malleolus bilaterally.       Impression:     IMPRESSION:  1. Patchy sclerosis identified along the central aspect of both  sacroiliac joints, which is favored to be degenerative. No distinct  erosions are seen.  2. No evidence of an inflammatory arthropathy in either foot or ankle.     VIELKA GUEVARA MD     5/2013  CBC WITH PLATELETS DIFFERENTIAL       Component Value Range    WBC 11.3 (*) 4.0 - 11.0 10e9/L    RBC Count 4.56  3.8 - 5.2 10e12/L    Hemoglobin 11.1 (*) 11.7 - 15.7 g/dL    Hematocrit 34.3 (*) 35.0 - 47.0 %    MCV 75 (*) 78 - 100 fl    MCH 24.3 (*) 26.5 - 33.0 pg    MCHC 32.4  31.5 - 36.5 g/dL    RDW 16.1 (*) 10.0 - 15.0 %    Platelet Count 315  150 - 450 10e9/L    Diff Method Automated Method      % Neutrophils 71.6  40 - 75 %    % Lymphocytes 20.9  20 - 48 %    % Monocytes 4.3  0 - 12 %    % Eosinophils 2.8  0 - 6 %    % Basophils 0.2  0 - 2 %    % Immature Granulocytes 0.2  0 - 0.4 %    Absolute Neutrophil 8.1  1.6 - 8.3 10e9/L    Absolute Lymphocytes 2.4  0.8 - 5.3 10e9/L    Absolute Monoctyes 0.5  0.0 - 1.3 10e9/L    Absolute Eosinophils 0.3  0.0 - 0.7 10e9/L    Absolute Basophils 0.0  0.0 - 0.2 10e9/L    Abs Immature Granulocytes 0.0  0 - 0.03 10e9/L   AMYLASE       Component Value Range    Amylase 103  30 - 110 U/L   COMPREHENSIVE METABOLIC PANEL       Component Value Range    Sodium 144  133 - 144 mmol/L    Potassium 3.8  3.4 - 5.3 mmol/L    Chloride 105  94 - 109 mmol/L    Carbon Dioxide 23  20 - 32 mmol/L    Anion Gap 17  6 - 17 mmol/L    Glucose 173 (*) 60 - 99 mg/dL    Urea Nitrogen 13  5 - 24 mg/dL    Creatinine 0.83  0.52 - 1.04 mg/dL    GFR Estimate 74  >60 mL/min/1.7m2    GFR Estimate If Black 90  >60  mL/min/1.7m2    Calcium 9.7  8.5 - 10.4 mg/dL    Bilirubin Total 0.4  0.2 - 1.3 mg/dL    Albumin 4.3  3.9 - 5.1 g/dL    Protein Total 7.8  6.8 - 8.8 g/dL    Alkaline Phosphatase 66  40 - 150 U/L    ALT 36  0 - 50 U/L    AST 28  0 - 45 U/L   CK TOTAL       Component Value Range    CK Total 66  30 - 225 U/L   CRP INFLAMMATION       Component Value Range    CRP Inflammation 10.4 (*) 0.0 - 8.0 mg/L   LIPASE       Component Value Range    Lipase 353 (*) 20 - 250 U/L   ERYTHROCYTE SEDIMENTATION RATE AUTO       Component Value Range    Sed Rate 26 (*) 0 - 20 mm/h   ROUTINE UA WITH MICROSCOPIC REFLEX TO CULTURE       Component Value Range    Color Urine Yellow      Appearance Urine Slightly Cloudy      Glucose Urine 30 (*) NEG mg/dL    Bilirubin Urine Negative  NEG    Ketones Urine 5 (*) NEG mg/dL    Specific Gravity Urine 1.026  1.003 - 1.035    Blood Urine Trace (*) NEG    pH Urine 5.0  5.0 - 7.0 pH    Protein Albumin Urine 10 (*) NEG mg/dL    Urobilinogen mg/dL Normal  0.0 - 2.0 mg/dL    Nitrite Urine Negative  NEG    Leukocyte Esterase Urine Negative  NEG    Source Midstream Urine      WBC Urine 1  0 - 2 /HPF    RBC Urine 4 (*) 0 - 2 /HPF    Squamous Epithelial /HPF Urine <1  0 - 1 /HPF    Mucous Urine Present (*) NEG /LPF    Hyaline Casts 3 (*) 0 - 2 /LPF    Calcium Oxalate Moderate (*) NEG /HPF   COMPLEMENT C3       Component Value Range    Complement C3 143  76 - 169 mg/dL   COMPLEMENT C4       Component Value Range    Complement C4 31  15 - 50 mg/dL   HEPATITIS C ANTIBODY       Component Value Range    Hepatitis C Antibody Negative  NEG   CARDIOLIPIN ANTIBODY IGG AND IGM       Component Value Range    Cardiolipin IgG Marline    0 - 15.0 GPL    Value: <15.0      Interpretation:  Negative    Cardiolipin IgM Marline    0 - 12.5 MPL    Value: <12.5      Interpretation:  Negative   LUPUS PANEL       Component Value Range    Lupus Result    NEG    Value: Negative      (Note)      COMMENTS:      The INR is normal.      APTT is  normal.  1:2 Mix is not indicated.      DRVVT Screen is normal.      Thrombin time is normal.      NEGATIVE TEST; A LUPUS ANTICOAGULANT WAS NOT DETECTED IN THIS      SPECIMEN WITHIN THE LIMITS OF THE TESTING REPERTOIRE.      If the clinical picture is strongly suggestive of an antiphospholipid      syndrome, recommend anticardiolipin and beta-2-glycoprotein (IgG and      IgM) antibody tests.      Leela Franks M.D.  [phone]      5/2/2013            INR =  0.93 N = 0.86-1.14  (No additional charge)      TT = 15.7 N = 13.0-19.0 sec  (No additional charge)            APTT'S:    Seconds      Reagent =  Stago LA      Normal  =  38      Patient  =  34      1:2 Mix  =  N/A      Reference =  31-43             DILUTE MADELINE VIPER [facility]ENOM TEST:      DRVVT Screen Ratio = 0.76 Normal = <1.21         IMMUNOGLOBULIN G SUBCLASSES       Component Value Range    IGG 1030  695 - 1620 mg/dL    IgG1 488  300 - 856 mg/dL    IgG2 325  158 - 761 mg/dL    IgG3 47  24 - 192 mg/dL    IgG4 18  11 - 86 mg/dL   SUNNY ANTIBODY PANEL       Component Value Range    Ribonucleic Protein IgG Antibody 0      Smith Antibody IgG 1      SSA (RO) Antibody IgG 4      SSB (LA) Antibody IgG 0      Scleroderma Antibody IgG 0     BETA 2 GLYCOPROTEIN ANTIBODIES IGG IGM       Component Value Range    Beta-2-Glycopro IgG 1      Beta-2-Glycopro IgM 5     CYCLIC CIT PEPT IGG       Component Value Range    Cyclic Cit Pept IgG/IgA    <20 UNITS    Value: <20      Interpretation:  Negative   DNA DOUBLE STRANDED ANTIBODIES       Component Value Range    DNA-ds    0 - 29 IU/mL    Value: <15      Interpretation:  Negative       Component      Latest Ref [facility] 3/11/2014   Sodium      133 - 144 mmol/L 137   Potassium      3.4 - 5.3 mmol/L 4.6   Chloride      94 - 109 mmol/L 97   Carbon Dioxide      20 - 32 mmol/L 20   Anion Gap      6 - 17 mmol/L 20 (H)   Glucose      60 - 99 mg/dL 243 (H)   Urea Nitrogen      5 - 24 mg/dL 35 (H)   Creatinine      0.52 - 1.04 mg/dL  1.47 (H)   GFR Estimate      >60 mL/min/1.7m2 38 (L)   GFR Estimate If Black      >60 mL/min/1.7m2 46 (L)   Calcium      8.5 - 10.4 mg/dL 9.7   Bilirubin Total      0.2 - 1.3 mg/dL 0.3   Albumin      3.9 - 5.1 g/dL 4.8   Protein Total      6.8 - 8.8 g/dL 7.9   Alkaline Phosphatase      40 - 150 U/L 73   ALT      0 - 50 U/L 35   AST      0 - 45 U/L 30   Color Urine       Yellow   Appearance Urine       Clear   Glucose Urine      NEG mg/dL 500 (A)   Bilirubin Urine      NEG Negative   Ketones Urine      NEG mg/dL Negative   Specific Gravity Urine      1.003 - 1.035 1.020   Blood Urine      NEG Negative   pH Urine      5.0 - 7.0 pH 5.5   Protein Albumin Urine      NEG mg/dL Negative   Urobilinogen Urine      0.2 - 1.0 EU/dL 0.2   Nitrite Urine      NEG Negative   Leukocyte Esterase Urine      NEG Negative   Source       Midstream Urine   WBC      4.0 - 11.0 10e9/L 14.0 (H)   RBC Count      3.8 - 5.2 10e12/L 5.02   Hemoglobin      11.7 - 15.7 g/dL 12.4   Hematocrit      35.0 - 47.0 % 37.1   MCV      78 - 100 fl 74 (L)   MCH      26.5 - 33.0 pg 24.7 (L)   MCHC      31.5 - 36.5 g/dL 33.4   RDW      10.0 - 15.0 % 15.3 (H)   Platelet Count      150 - 450 10e9/L 420       Component      Latest Ref Rn 3/25/2014   Sodium      133 - 144 mmol/L 137   Potassium      3.4 - 5.3 mmol/L 3.7   Chloride      94 - 109 mmol/L 100   Carbon Dioxide      20 - 32 mmol/L 21   Anion Gap      6 - 17 mmol/L 16   Glucose      60 - 99 mg/dL 214 (H)   Urea Nitrogen      5 - 24 mg/dL 20   Creatinine      0.52 - 1.04 mg/dL 1.02   GFR Estimate      >60 mL/min/1.7m2 58 (L)   GFR Estimate If Black      >60 mL/min/1.7m2 70   Calcium      8.5 - 10.4 mg/dL 9.5   Phosphorus      2.5 - 4.5 mg/dL 3.7   Albumin      3.9 - 5.1 g/dL 4.6     Component      Latest Ref Rng 4/30/2014   CRP Inflammation      0.0 - 8.0 mg/L 10.0 (H)   Sed Rate      0 - 20 mm/h 39 (H)   Rheumatoid Factor      <20 IU/mL <20   Glucose-6-PO4 Dehydrogenase       17.7     Component       Latest Ref Rng 6/11/2014 7/15/2014   WBC      4.0 - 11.0 10e9/L 11.0    RBC Count      3.8 - 5.2 10e12/L 4.74    Hemoglobin      11.7 - 15.7 g/dL 11.8    Hematocrit      35.0 - 47.0 % 36.1    MCV      78 - 100 fl 76 (L)    MCH      26.5 - 33.0 pg 24.9 (L)    MCHC      31.5 - 36.5 g/dL 32.7    RDW      10.0 - 15.0 % 13.9    Platelet Count      150 - 450 10e9/L 434    Diff Method       Automated Method    % Neutrophils       59.2    % Lymphocytes       31.6    % Monocytes       5.9    % Eosinophils       2.6    % Basophils       0.5    % Immature Granulocytes       0.2    Absolute Neutrophil      1.6 - 8.3 10e9/L 6.5    Absolute Lymphocytes      0.8 - 5.3 10e9/L 3.5    Absolute Monoctyes      0.0 - 1.3 10e9/L 0.7    Absolute Eosinophils      0.0 - 0.7 10e9/L 0.3    Absolute Basophils      0.0 - 0.2 10e9/L 0.1    Abs Immature Granulocytes      0 - 0.4 10e9/L 0.0    Sodium      133 - 144 mmol/L 138 140   Potassium      3.4 - 5.3 mmol/L 3.9 3.8   Chloride      94 - 109 mmol/L 97 103   Carbon Dioxide      20 - 32 mmol/L 26 22   Anion Gap      6 - 17 mmol/L 14.9 15   Glucose      60 - 99 mg/dL 111 (H) 163 (H)   Urea Nitrogen      5 - 24 mg/dL 28 (H) 15   Creatinine      0.52 - 1.04 mg/dL 1.10 (H) 0.99   GFR Estimate      >60 mL/min/1.7m2 53 (L) 60 (L)   GFR Estimate If Black      >60 mL/min/1.7m2 64 72   Calcium      8.5 - 10.4 mg/dL 10.3 9.3   Bilirubin Total      0.2 - 1.3 mg/dL 0.6 0.2   Albumin      3.9 - 5.1 g/dL 5.1 4.2   Protein Total      6.8 - 8.8 g/dL 9.0 (H) 7.2   Alkaline Phosphatase      40 - 150 U/L 87 69   ALT      0 - 50 U/L 37 33   AST      0 - 45 U/L 40 26   Color Urine       Straw    Appearance Urine       Clear    Glucose Urine      NEG mg/dL Negative    Bilirubin Urine      NEG Negative    Ketones Urine      NEG mg/dL Negative    Specific Gravity Urine      1.003 - 1.035 1.005    Blood Urine      NEG Negative    pH Urine      5.0 - 7.0 pH 5.0    Protein Albumin Urine      NEG mg/dL Negative     Urobilinogen mg/dL      0.0 - 2.0 mg/dL Normal    Nitrite Urine      NEG Negative    Leukocyte Esterase Urine      NEG Negative    Source       Midstream Urine    Lipase      20 - 250 U/L 403 (H)    CRP Inflammation      0.0 - 8.0 mg/L <5.0    N-Terminal Pro Bnp      0 - 125 pg/mL  55   Hemoglobin A1C      4.3 - 6.0 %  7.0 (H)     Component      Latest Ref Rng 11/12/2014   Sodium      133 - 144 mmol/L 135   Potassium      3.4 - 5.3 mmol/L 3.8   Chloride      94 - 109 mmol/L 104   Carbon Dioxide      20 - 32 mmol/L 24   Anion Gap      3 - 14 mmol/L 7   Glucose      70 - 99 mg/dL 125 (H)   Urea Nitrogen      7 - 30 mg/dL 17   Creatinine      0.52 - 1.04 mg/dL 1.18 (H)   GFR Estimate      >60 mL/min/1.7m2 49 (L)   GFR Estimate If Black      >60 mL/min/1.7m2 59 (L)   Calcium      8.5 - 10.1 mg/dL 9.8   WBC      4.0 - 11.0 10e9/L 11.1 (H)   RBC Count      3.8 - 5.2 10e12/L 4.05   Hemoglobin      11.7 - 15.7 g/dL 9.8 (L)   Hematocrit      35.0 - 47.0 % 30.6 (L)   MCV      78 - 100 fl 76 (L)   MCH      26.5 - 33.0 pg 24.2 (L)   MCHC      31.5 - 36.5 g/dL 32.0   RDW      10.0 - 15.0 % 15.5 (H)   Platelet Count      150 - 450 10e9/L 484 (H)   CT CHEST PULMONARY EMBOLISM W CONTRAST 5/20/2015 4:57 PM  HISTORY: Pain. SOB. Elevated d-dimer.   TECHNIQUE: 65 mL Isovue 370. Axial images with coronal  reconstructions.  COMPARISON: None.  FINDINGS: Calcified granuloma with surrounding scarring in the  posterolateral left upper lobe. Triangular shaped opacity at the right  lung base in the lateral right middle lobe could represent some  scarring, atelectasis or infiltrate. There is also some scarring or  atelectasis in the posteromedial right lung base. Lungs otherwise  clear.  The pulmonary arteries are well opacified. No CT evidence for acute  pulmonary embolus. No aortic dissection.  Diffuse fatty infiltration of the liver.  IMPRESSION  IMPRESSION:   1. No pulmonary embolus identified.  2. Small focus of atelectasis, infiltrate  or scarring in the lateral  right middle lobe.  3. Old granulomatous disease.  4. Otherwise negative.  SILVERIO VAZQUEZ MD  Component      Latest Ref Rng 3/27/2015   WBC      4.0 - 11.0 10e9/L 11.8 (H)   RBC Count      3.8 - 5.2 10e12/L 4.53   Hemoglobin      11.7 - 15.7 g/dL 11.0 (L)   Hematocrit      35.0 - 47.0 % 33.8 (L)   MCV      78 - 100 fl 75 (L)   MCH      26.5 - 33.0 pg 24.3 (L)   MCHC      31.5 - 36.5 g/dL 32.5   RDW      10.0 - 15.0 % 15.4 (H)   Platelet Count      150 - 450 10e9/L 419   Diff Method       Automated Method   % Neutrophils       75.3   % Lymphocytes       17.4   % Monocytes       4.4   % Eosinophils       2.5   % Basophils       0.2   % Immature Granulocytes       0.2   Absolute Neutrophil      1.6 - 8.3 10e9/L 8.9 (H)   Absolute Lymphocytes      0.8 - 5.3 10e9/L 2.1   Absolute Monoctyes      0.0 - 1.3 10e9/L 0.5   Absolute Eosinophils      0.0 - 0.7 10e9/L 0.3   Absolute Basophils      0.0 - 0.2 10e9/L 0.0   Abs Immature Granulocytes      0 - 0.4 10e9/L 0.0   Creatinine      0.52 - 1.04 mg/dL 1.12 (H)   GFR Estimate      >60 mL/min/1.7m2 52 (L)   GFR Estimate If Black      >60 mL/min/1.7m2 63   Iron      35 - 180 ug/dL 25 (L)   Iron Binding Cap      240 - 430 ug/dL 346   Iron Saturation Index      15 - 46 % 7 (L)   Albumin      3.4 - 5.0 g/dL 4.0   ALT      0 - 50 U/L 24   AST      0 - 45 U/L 15   CRP Inflammation      0.0 - 8.0 mg/L 28.0 (H)   Sed Rate      0 - 20 mm/h 81 (H)   Rheumatoid Factor      <20 IU/mL <20   Vitamin D Deficiency screening      30 - 75 ug/L 44   Ferritin      8 - 252 ng/mL 34     Component      Latest Ref Rng 7/29/2015   Sodium      133 - 144 mmol/L 137   Potassium      3.4 - 5.3 mmol/L 3.8   Chloride      94 - 109 mmol/L 106   Carbon Dioxide      20 - 32 mmol/L 23   Anion Gap      3 - 14 mmol/L 8   Glucose      70 - 99 mg/dL 148 (H)   Urea Nitrogen      7 - 30 mg/dL 17   Creatinine      0.52 - 1.04 mg/dL 1.02   GFR Estimate      >60 mL/min/1.7m2 58 (L)   GFR  Estimate If Black      >60 mL/min/1.7m2 70   Calcium      8.5 - 10.1 mg/dL 9.0   Bilirubin Total      0.2 - 1.3 mg/dL 0.5   Albumin      3.4 - 5.0 g/dL 4.2   Protein Total      6.8 - 8.8 g/dL 7.4   Alkaline Phosphatase      40 - 150 U/L 64   ALT      0 - 50 U/L 23   AST      0 - 45 U/L 19     Results for JUAN FAVIO CHELLE (MRN [id number]) as of 10/30/2015 17:00   Ref. Range 8/21/2012 09:06 5/22/2013 14:22 4/14/2014 12:06 9/11/2014 12:35 12/4/2014 16:38   TSH Latest Range: 0.40-4.00 mU/L 0.83 0.43 0.27 (L) 0.23 (L) 0.22 (L)     Component      Latest Ref Rng 10/30/2015   WBC      4.0 - 11.0 10e9/L 13.4 (H)   RBC Count      3.8 - 5.2 10e12/L 4.76   Hemoglobin      11.7 - 15.7 g/dL 12.4   Hematocrit      35.0 - 47.0 % 37.9   MCV      78 - 100 fl 80   MCH      26.5 - 33.0 pg 26.1 (L)   MCHC      31.5 - 36.5 g/dL 32.7   RDW      10.0 - 15.0 % 14.5   Platelet Count      150 - 450 10e9/L 324   Diff Method       Automated Method   % Neutrophils       67.7   % Lymphocytes       22.7   % Monocytes       6.3   % Eosinophils       2.7   % Basophils       0.4   % Immature Granulocytes       0.2   Absolute Neutrophil      1.6 - 8.3 10e9/L 9.1 (H)   Absolute Lymphocytes      0.8 - 5.3 10e9/L 3.1   Absolute Monoctyes      0.0 - 1.3 10e9/L 0.9   Absolute Eosinophils      0.0 - 0.7 10e9/L 0.4   Absolute Basophils      0.0 - 0.2 10e9/L 0.1   Abs Immature Granulocytes      0 - 0.4 10e9/L 0.0   Creatinine      0.52 - 1.04 mg/dL 1.21 (H)   GFR Estimate      >60 mL/min/1.7m2 47 (L)   GFR Estimate If Black      >60 mL/min/1.7m2 57 (L)   Iron      35 - 180 ug/dL 70   Iron Binding Cap      240 - 430 ug/dL 428   Iron Saturation Index      15 - 46 % 16   Albumin      3.4 - 5.0 g/dL 4.6   ALT      0 - 50 U/L 29   AST      0 - 45 U/L 21   CRP Inflammation      0.0 - 8.0 mg/L <2.9   Sed Rate      0 - 20 mm/h 8   Vitamin D Deficiency screening      20 - 75 ug/L 46   Ferritin      8 - 252 ng/mL 18   TSH      0.40 - 4.00 mU/L 0.49   T4 Free      0.76 -  1.46 ng/dL 1.06   Free T3      2.3 - 4.2 pg/mL 2.8     Component      Latest Ref Rng 11/17/2015   Testosterone Total      8 - 60 ng/dL 19   Sex Hormone Binding Globulin      30 - 135 nmol/L 32   Free Testosterone Calculated      0.11 - 0.58 ng/dL 0.34   Vitamin A       0.61   Retinol Palmitate       <0.02 . . .   Vitamin A Interp       Normal . . .   Thyroglobulin Antibody      <40 IU/mL <20   Thyroid Peroxidase Antibody      <35 IU/mL 31   DHEA Sulfate      35 - 430 ug/dL 101   Zinc       68     Component      Latest Ref Rng 1/27/2016   Sodium      133 - 144 mmol/L 135   Potassium      3.4 - 5.3 mmol/L 4.0   Chloride      94 - 109 mmol/L 104   Carbon Dioxide      20 - 32 mmol/L 24   Anion Gap      3 - 14 mmol/L 7   Glucose      70 - 99 mg/dL 88   Urea Nitrogen      7 - 30 mg/dL 20   Creatinine      0.52 - 1.04 mg/dL 1.13 (H)   GFR Estimate      >60 mL/min/1.7m2 51 (L)   GFR Estimate If Black      >60 mL/min/1.7m2 62   Calcium      8.5 - 10.1 mg/dL 9.4   Bilirubin Total      0.2 - 1.3 mg/dL 0.7   Albumin      3.4 - 5.0 g/dL 4.3   Protein Total      6.8 - 8.8 g/dL 7.7   Alkaline Phosphatase      40 - 150 U/L 66   ALT      0 - 50 U/L 24   AST      0 - 45 U/L 18   Cholesterol      <200 mg/dL 112   Triglycerides      <150 mg/dL 100   HDL Cholesterol      >49 mg/dL 34 (L)   LDL Cholesterol Calculated      <100 mg/dL 58   Non HDL Cholesterol      <130 mg/dL 78   N-Terminal Pro Bnp      0 - 125 pg/mL 29   Hemoglobin A1C      4.3 - 6.0 % 7.0 (H)   Amylase      30 - 110 U/L 60   Lipase      73 - 393 U/L 394 (H)   Troponin I ES      0.000 - 0.045 ug/L <0.015 . . .     Component      Latest Ref Rng 2/24/2016   Angiotensin Converting Enzyme       <5 (L) . . .   Neutrophil Cytoplasmic IgG Antibody       <1:20 . . .   Sed Rate      0 - 20 mm/h 86 (H)     Copath Report      Patient Name: FAVIO MARTINEZ   MR#: [id number]   Specimen #: [id number]   Collected: 3/15/2016   Received: 3/15/2016   Reported: 3/17/2016 13:33   Ordering  "Phy(s): PARVEEN ENRIQUEZ     ORIGINAL REPORT FOLLOWS ADDENDUM  ADDENDUM     TO ORIGINAL REPORT   Status: Signed Out   Date Ordered:3/18/2016   Date Complete:3/18/2016   Date Reported:3/18/2016 12:06   Signed Out By: Marianne Godfrey MD     INTERPRETATION:   This addendum is done to incorporate the results of fungal (GMS) stains   done on the specimen.  Specimen is negative for fungal organisms.  The   original final diagnosis remains unchanged.     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  Negative for malignancy(Please   see microscopic description)     Electronically signed out by:     Marianne Godfrey MD     CLINICAL HISTORY:   right orbital mass     GROSS:   The specimen is received labeled \"right orbital biopsy\" and consists of   red-tan nodule measuring 1.5 x 0.9 x 0.6 cm with one smooth side and   opposite rough side consistent with periosteum.  The specimen is   bisected revealing homogenous pale tan fleshy cut surface.  Touch   preparations are prepared, air dried and fixed, portion of specimen is   submitted in RPMI for possible lymphoma workup [facility],   ([employer] [facility], [place], [place] ).  The remainder is entirely submitted.   (Dictated by: Marianne Godfrey MD 3/15/2016 04:45 PM)     MICROSCOPIC:     Specimen consists of portion of lacrimal gland with acute and chronic   inflammation.  Focal area of microabscess formation associated with   small areas of necrosis are also present.  Inflammation is seen   extending to the periorbital adipose tissue forming panniculitis.   Specimen is negative for malignancy.  Samples sent for immunophenotyping   to [facility], ([facility], [place], [place] ) reveals no evidence   of monoclonality or aberrant antigen expression.  A GMS " (fungal) stain   is pending and results will be reported as an addendum.     CPT Codes:   A: 32566-KG8, 25921-HWPDK, SOH, 63101-FQJOV, 37669-TNRU     TESTING LAB LOCATION:   [address]  [id number] [phone]     COLLECTION SITE:   Client: [facility]   Location: [facility] (S)            Component      Latest Ref Rng 4/29/2016   Nucleated RBCs      0 /100 0   Absolute Neutrophil      1.6 - 8.3 10e9/L 8.9 (H)   Absolute Lymphocytes      0.8 - 5.3 10e9/L 3.2   Absolute Monocytes      0.0 - 1.3 10e9/L 0.8   Absolute Eosinophils      0.0 - 0.7 10e9/L 0.2   Absolute Basophils      0.0 - 0.2 10e9/L 0.1   Abs Immature Granulocytes      0 - 0.4 10e9/L 0.1   Absolute Nucleated RBC       0.0   IGG      695 - 1620 mg/dL 836   IgG1      300 - 856 mg/dL 280 (L)   IgG2      158 - 761 mg/dL 277   IgG3      24 - 192 mg/dL 35   IgG4      11 - 86 mg/dL 16   RNP Antibody IgG      0.0 - 0.9 AI <0.2 . . .   Smith SUNNY Antibody IgG      0.0 - 0.9 AI <0.2 . . .   SSA (Ro) (SUNNY) Antibody, IgG      0.0 - 0.9 AI <0.2 . . .   SSB (La) (SUNNY) Antibody, IgG      0.0 - 0.9 AI <0.2 . . .   Scleroderma Antibody Scl-70 SUNNY IgG      0.0 - 0.9 AI <0.2 . . .   Cholesterol      <200 mg/dL 154   Triglycerides      <150 mg/dL 220 (H)   HDL Cholesterol      >49 mg/dL 42 (L)   LDL Cholesterol Calculated      <100 mg/dL 67   Non HDL Cholesterol      <130 mg/dL 111   M Tuberculosis Result      NEG Negative   M Tuberculosis Antigen Value       0.00   Albumin      3.4 - 5.0 g/dL 4.3   ALT      0 - 50 U/L 30   AST      0 - 45 U/L 10   Complement C3      76 - 169 mg/dL 157   Complement C4      15 - 50 mg/dL 32   CRP Inflammation      0.0 - 8.0 mg/L <2.9   Sed Rate      0 - 20 mm/h 2   DNA-ds      <10 IU/mL 1   Cyclic Citrullinated Peptide Antibody, IgG      <7 U/mL 1   Rheumatoid Factor      <20 IU/mL <20   Proteinase 3 Antibody IgG      0.0 - 0.9 AI <0.2 . . .   Myeloperoxidase Antibody IgG       0.0 - 0.9 AI 2.5 (H)   Vitamin D Deficiency screening      20 - 75 ug/L 24   Hemoglobin A1C      4.3 - 6.0 % 7.9 (H)   ALLI by IFA IgG       1:40 (A) . . .     Component      Latest Ref Rng & Units 4/7/2017   WBC      4.0 - 11.0 10e9/L 11.3 (H)   RBC Count      3.8 - 5.2 10e12/L 4.77   Hemoglobin      11.7 - 15.7 g/dL 12.5   Hematocrit      35.0 - 47.0 % 38.7   MCV      78 - 100 fl 81   MCH      26.5 - 33.0 pg 26.2 (L)   MCHC      31.5 - 36.5 g/dL 32.3   RDW      10.0 - 15.0 % 13.2   Platelet Count      150 - 450 10e9/L 347   Diff Method       Automated Method   % Neutrophils      % 67.1   % Lymphocytes      % 22.9   % Monocytes      % 6.2   % Eosinophils      % 3.0   % Basophils      % 0.4   % Immature Granulocytes      % 0.4   Nucleated RBCs      0 /100 0   Absolute Neutrophil      1.6 - 8.3 10e9/L 7.5   Absolute Lymphocytes      0.8 - 5.3 10e9/L 2.6   Absolute Monocytes      0.0 - 1.3 10e9/L 0.7   Absolute Eosinophils      0.0 - 0.7 10e9/L 0.3   Absolute Basophils      0.0 - 0.2 10e9/L 0.1   Abs Immature Granulocytes      0 - 0.4 10e9/L 0.1   Absolute Nucleated RBC       0.0   IGG      695 - 1620 mg/dL 962   IgG1      300 - 856 mg/dL Test sent to [facility]. See ARMISC.   IgG2      158 - 761 mg/dL Test sent to [facility]. See ARMISC.   IgG3      24 - 192 mg/dL Test sent to [facility]. See ARMISC.   IgG4      11 - 86 mg/dL Test sent to [facility]. See ARMISC.   Result       SEE NOTE . . .   Test Name       IGG SUBCLASSES   Send Outs Misc Test Code       77959   Send Outs Misc Test Specimen       Serum   Creatinine      0.52 - 1.04 mg/dL 1.20 (H)   GFR Estimate      >60 mL/min/1.7m2 47 (L)   GFR Estimate If Black      >60 mL/min/1.7m2 57 (L)   Creatinine Urine      mg/dL    Albumin Urine mg/L      mg/L    Albumin Urine mg/g Cr      0 - 25 mg/g Cr    Myeloperoxidase Antibody IgG      0.0 - 0.9 AI 2.9 (H)   Proteinase 3 Antibody IgG      0.0 - 0.9 AI <0.2 . . .   Neutrophil Cytoplasmic IgG Antibody       1:80 (A) . . .   Angiotensin  Converting Enzyme       <5 (L) . . .   Lab Scanned Result       TPMT GENOTYPE-Scanned   Vitamin C       56   ALT      0 - 50 U/L 23   Albumin      3.4 - 5.0 g/dL 4.3   AST      0 - 45 U/L 18   CRP Inflammation      0.0 - 8.0 mg/L 3.7   Sed Rate      0 - 30 mm/h 17   Vitamin D Deficiency screening      20 - 75 ug/L 40   Hemoglobin A1C      4.3 - 6.0 %      Component      Latest Ref Rng & Units 4/26/2017   WBC      4.0 - 11.0 10e9/L 11.8 (H)   RBC Count      3.8 - 5.2 10e12/L 4.23   Hemoglobin      11.7 - 15.7 g/dL 11.2 (L)   Hematocrit      35.0 - 47.0 % 35.0   MCV      78 - 100 fl 83   MCH      26.5 - 33.0 pg 26.5   MCHC      31.5 - 36.5 g/dL 32.0   RDW      10.0 - 15.0 % 13.4   Platelet Count      150 - 450 10e9/L 304   Diff Method       Automated Method   % Neutrophils      % 66.2   % Lymphocytes      % 22.7   % Monocytes      % 6.5   % Eosinophils      % 3.9   % Basophils      % 0.4   % Immature Granulocytes      % 0.3   Nucleated RBCs      0 /100 0   Absolute Neutrophil      1.6 - 8.3 10e9/L 7.8   Absolute Lymphocytes      0.8 - 5.3 10e9/L 2.7   Absolute Monocytes      0.0 - 1.3 10e9/L 0.8   Absolute Eosinophils      0.0 - 0.7 10e9/L 0.5   Absolute Basophils      0.0 - 0.2 10e9/L 0.1   Abs Immature Granulocytes      0 - 0.4 10e9/L 0.0   Absolute Nucleated RBC       0.0   IGG      695 - 1620 mg/dL    IgG1      300 - 856 mg/dL    IgG2      158 - 761 mg/dL    IgG3      24 - 192 mg/dL    IgG4      11 - 86 mg/dL    Result          Test Name          Send Outs Misc Test Code          Send Outs Misc Test Specimen          Creatinine      0.52 - 1.04 mg/dL 1.24 (H)   GFR Estimate      >60 mL/min/1.7m2 46 (L)   GFR Estimate If Black      >60 mL/min/1.7m2 55 (L)   Creatinine Urine      mg/dL 121   Albumin Urine mg/L      mg/L <5   Albumin Urine mg/g Cr      0 - 25 mg/g Cr Unable to calculate due to low value   Myeloperoxidase Antibody IgG      0.0 - 0.9 AI    Proteinase 3 Antibody IgG      0.0 - 0.9 AI    Neutrophil  Cytoplasmic IgG Antibody          Angiotensin Converting Enzyme          Lab Scanned Result          Vitamin C          ALT      0 - 50 U/L 25   Albumin      3.4 - 5.0 g/dL 4.1   AST      0 - 45 U/L 15   CRP Inflammation      0.0 - 8.0 mg/L    Sed Rate      0 - 30 mm/h    Vitamin D Deficiency screening      20 - 75 ug/L    Hemoglobin A1C      4.3 - 6.0 % 7.8 (H)   EXAMINATION: CT CHEST ABDOMEN PELVIS W/O CONTRAST, 4/7/2017 2:59 PM     TECHNIQUE: Helical CT images from the thoracic inlet through the  symphysis pubis were obtained without IV contrast.     COMPARISON: CT chest on 5/20/2015. CT abdomen pelvis on 6/11/2014     HISTORY: Granuloma of right orbit. Concern for malignancy, lymphoma.     Additional history from the EMR: 50-year-old female with rheumatoid  arthritis and fibromyalgia. Presented on April 2016 with new right  orbital inflammatory mass, biopsied showed panniculitis without  infection or malignancy. Some intermittent swelling around her right  orbit.     FINDINGS:     Chest: Cardiac size is not enlarged. No pericardial effusion.  Calcified subcentimeter mediastinal and left hilar lymph nodes. No  thoracic adenopathy. Coronary artery calcifications/stents.  Benign-appearing coarse calcifications in the thyroid, better  described on ultrasound thyroid from 9/13/2016.     Central airways are patent. No pneumothorax or pleural effusion.  Calcified pulmonary granuloma in the posterior left upper lobe,  benign. Small focus of subpleural fibrosis and cysts along the  anterior lateral right lower lobe (image 96 series 6).     Abdomen and pelvis: Cholecystectomy. The liver, adrenal glands,  kidneys, spleen, pancreas, stomach, duodenum are without focal  abnormality on these noncontrast images.     No abdominal aortic aneurysm. No suspicious adenopathy in the abdomen  or pelvis. Bladder is intact. Calcified fibroid off the uterine  fundus. IUD in the uterus.     No focal bowel wall thickening or obstruction.  No free air or free  fluid. Appendix is normal. Small periumbilical hernia.     Bones and soft tissues: No suspicious osseous lesions. Unremarkable  superficial soft tissues.         IMPRESSION: No evidence of malignancy in the chest, abdomen, or  pelvis.        I have personally reviewed the examination and initial interpretation  and I agree with the findings.     CONNIE BRICE      Component      Latest Ref Rng & Units 6/1/2017   WBC      4.0 - 11.0 10e9/L 12.0 (H)   RBC Count      3.8 - 5.2 10e12/L 5.18   Hemoglobin      11.7 - 15.7 g/dL 13.8   Hematocrit      35.0 - 47.0 % 41.0   MCV      78 - 100 fl 79   MCH      26.5 - 33.0 pg 26.6   MCHC      31.5 - 36.5 g/dL 33.7   RDW      10.0 - 15.0 % 14.8   Platelet Count      150 - 450 10e9/L 322   Diff Method       Automated Method   % Neutrophils      % 76.7   % Lymphocytes      % 16.5   % Monocytes      % 4.6   % Eosinophils      % 1.9   % Basophils      % 0.3   Absolute Neutrophil      1.6 - 8.3 10e9/L 9.2 (H)   Absolute Lymphocytes      0.8 - 5.3 10e9/L 2.0   Absolute Monocytes      0.0 - 1.3 10e9/L 0.6   Absolute Eosinophils      0.0 - 0.7 10e9/L 0.2   Absolute Basophils      0.0 - 0.2 10e9/L 0.0   Color Urine       Yellow   Appearance Urine       Clear   Glucose Urine      NEG mg/dL Negative   Bilirubin Urine      NEG Negative   Ketones Urine      NEG mg/dL Negative   Specific Gravity Urine      1.003 - 1.035 1.010   pH Urine      5.0 - 7.0 pH 5.5   Protein Albumin Urine      NEG mg/dL Negative   Urobilinogen Urine      0.2 - 1.0 EU/dL 0.2   Nitrite Urine      NEG Negative   Blood Urine      NEG Negative   Leukocyte Esterase Urine      NEG Negative   Source       Midstream Urine   WBC Urine      0 - 2 /HPF O - 2   RBC Urine      0 - 2 /HPF O - 2   Squamous Epithelial /LPF Urine      FEW /LPF Few   Bacteria Urine      NEG /HPF Few (A)   Creatinine      0.52 - 1.04 mg/dL 1.19 (H)   GFR Estimate      >60 mL/min/1.7m2 48 (L)   GFR Estimate If Black      >60  mL/min/1.7m2 58 (L)   Protein Random Urine      g/L <0.05   Protein Total Urine g/gr Creatinine      0 - 0.2 g/g Cr Unable to calculate due to low value   Myeloperoxidase Antibody IgG      0.0 - 0.9 AI 1.9 (H)   Proteinase 3 Antibody IgG      0.0 - 0.9 AI <0.2 . . .   ALT      0 - 50 U/L 29   AST      0 - 45 U/L 18   Albumin      3.4 - 5.0 g/dL 4.6   CRP Inflammation      0.0 - 8.0 mg/L 6.1   Sed Rate      0 - 30 mm/h 13   Creatinine Urine Random      mg/dL 54   Neutrophil Cytoplasmic IgG Antibody       <1:20 . . .   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      Comparison: MRI brain 5/29/2013      Technique:   Orbits: Axial and coronal T1-weighted, and coronal T2-weighted images  obtained without intravenous contrast. Post-intravenous contrast  (using gadolinium) sagittal FLAIR, were obtained with fat-saturation,  focused on the orbits.  Brain: Axial susceptibility-weighted and FLAIR sequences were obtained  of the whole brain without intravenous contrast, and postcontrast  axial T1-weighted images were obtained through the whole brain.   Contrast: 7.5mL Gadavist     Findings:  New asymmetric enlargement of the right lacrimal gland and enhancement  of the right lacrimal gland with surrounding ill-defined crescentic T2  hyperintense signal and enhancement within the right superior  superotemporal orbit, predominantly involving the extraconal space  with slight intraconal extension. No definite associated restricted  diffusion. No discrete extraocular muscle enlargement. Normal  symmetric optic nerve signal. Cavernous sinuses and orbital apices are  normal. Globes are normal.     Whole brain images are symmetric minimal leukoaraiosis and generalized  parenchymal  volume loss. Ventricles are not enlarged. No intracranial  hemorrhage, mass effect, midline shift or abnormal extra axial fluid  collection. No abnormal foci of intracranial enhancement or restricted  diffusion. Paranasal sinuses and mastoid air cells are clear.            Impression:    New abnormal enlargement of the right lacrimal gland with surrounding  ill-defined T2 hyperintense signal and enhancement centered in the  superotemporal right orbital fat, which may represent   infectious/inflammatory dacryadenitis, orbital pseudotumor, lymphoma,  carcinoma, sarcoidosis or IgG4 disease..     I have personally reviewed the examination and initial interpretation  and I agree with the findings.     PATRICIA BRANTLEY MD      Component      Latest Ref Rng & Units 6/1/2017   WBC      4.0 - 11.0 10e9/L 12.0 (H)   RBC Count      3.8 - 5.2 10e12/L 5.18   Hemoglobin      11.7 - 15.7 g/dL 13.8   Hematocrit      35.0 - 47.0 % 41.0   MCV      78 - 100 fl 79   MCH      26.5 - 33.0 pg 26.6   MCHC      31.5 - 36.5 g/dL 33.7   RDW      10.0 - 15.0 % 14.8   Platelet Count      150 - 450 10e9/L 322   Diff Method       Automated Method   % Neutrophils      % 76.7   % Lymphocytes      % 16.5   % Monocytes      % 4.6   % Eosinophils      % 1.9   % Basophils      % 0.3   Absolute Neutrophil      1.6 - 8.3 10e9/L 9.2 (H)   Absolute Lymphocytes      0.8 - 5.3 10e9/L 2.0   Absolute Monocytes      0.0 - 1.3 10e9/L 0.6   Absolute Eosinophils      0.0 - 0.7 10e9/L 0.2   Absolute Basophils      0.0 - 0.2 10e9/L 0.0   Color Urine       Yellow   Appearance Urine       Clear   Glucose Urine      NEG mg/dL Negative   Bilirubin Urine      NEG Negative   Ketones Urine      NEG mg/dL Negative   Specific Gravity Urine      1.003 - 1.035 1.010   pH Urine      5.0 - 7.0 pH 5.5   Protein Albumin Urine      NEG mg/dL Negative   Urobilinogen Urine      0.2 - 1.0 EU/dL 0.2   Nitrite Urine      NEG Negative   Blood Urine      NEG Negative   Leukocyte Esterase  "Urine      NEG Negative   Source       Midstream Urine   WBC Urine      0 - 2 /HPF O - 2   RBC Urine      0 - 2 /HPF O - 2   Squamous Epithelial /LPF Urine      FEW /LPF Few   Bacteria Urine      NEG /HPF Few (A)   Creatinine      0.52 - 1.04 mg/dL 1.19 (H)   GFR Estimate      >60 mL/min/1.7m2 48 (L)   GFR Estimate If Black      >60 mL/min/1.7m2 58 (L)   Protein Random Urine      g/L <0.05   Protein Total Urine g/gr Creatinine      0 - 0.2 g/g Cr Unable to calculate due to low value   Myeloperoxidase Antibody IgG      0.0 - 0.9 AI 1.9 (H)   Proteinase 3 Antibody IgG      0.0 - 0.9 AI <0.2 . . .   ALT      0 - 50 U/L 29   AST      0 - 45 U/L 18   Albumin      3.4 - 5.0 g/dL 4.6   CRP Inflammation      0.0 - 8.0 mg/L 6.1   Sed Rate      0 - 30 mm/h 13   Creatinine Urine Random      mg/dL 54   Neutrophil Cytoplasmic IgG Antibody       <1:20 . . .   Creatinine Urine      mg/dL 54       Patient Name: FAVIO MARTINEZ   MR#: [id number]   Specimen #: [id number]   Collected: 8/4/2017   Received: 8/4/2017   Reported: 8/9/2017 16:19   Ordering Phy(s): CRISTHIAN FLORES     For improved result formatting, select 'View Enhanced Report Format'   under Linked Documents section.     SPECIMEN(S):   Eye, right lacrimal gland     FINAL DIAGNOSIS:   Eye, right, \"lacrimal gland\", biopsy   - Dense fibrosis and patchy inflammation   - No residual lacrimal gland seen     COMMENT:   Sections show tissue involved by dense fibrosis and patchy inflammation.   There is no morphologic or immunohistochemical evidence of lymphoma. By   separate report, concurrent flow cytometry studies ([id number]),   performed at the [facility], show polytypic B cells and no   aberrant immunophenotype on T cells. Immunohistochemical stains for IgG   and IgG-4 were performed, which provide no support for IgG-4-related   disease. Some of these changes may be related to prior surgery. Sjögren   disease is not excluded. There is no evidence of malignancy. Dr." Max Conway has reviewed this case and concurs.     Electronically signed out by:     Antonella Beth M.D.   INDICATION:  Right eye edema. Reported history of right orbital biopsy yielding pathology consistent with idiopathic inflammation.    TECHNIQUE:  Noncontrast CT images were obtained through the brain and facial bones.    COMPARISON:  CT sinus 03/27/2017, MRI brain and orbits 02/15/2016.     FINDINGS:  CT facial bones:   Large soft tissue lesion centered within the lateral intraconal and extraconal right orbit has significantly increased in size compared to the CT dated 03/27/2017. The lesion is inseparable from the right superior, lateral, and inferior rectus muscles, as well as the right lacrimal gland. The lesion demonstrates extension posteriorly slightly proximal to the orbital apex, as well as extension into the medial orbit superiorly and anteriorly. Mass effect includes medial bowing of the optic nerve sheath complex and pronounced proptosis of the right globe.  No mass is identified in the right orbit.  Torus palatinus.   Minimal mucosal thickening in the ethmoid air cells. The remainder of the visualized paranasal sinuses are clear.  CT brain:  The ventricles and sulci within normal limits for patient age. No mass effect or midline shift. The gray-white differentiation is maintained.  No acute intracranial hemorrhage or pathologic extra-axial fluid collection.  The calvarium is intact. The mastoid air cells are clear.    IMPRESSION:  1. Large soft tissue lesion centered within the lateral intraconal and extraconal right orbit has significantly increased in size compared to the CT dated 03/27/2017. The lesion is inseparable from the right lacrimal gland and rectus musculature. Mass effect includes medial bowing of the optic nerve sheath complex and pronounced proptosis of the right globe. Given provided history, findings may represent a progressive inflammatory etiology (pseudotumor). Other  differential considerations, such as sarcoidosis or lymphoma, could also have this appearance.  2. No acute intracranial hemorrhage or intracranial mass effect.    Please note that all CT scans at this facility use dose modulation, iterative reconstruction, and/or weight-based dosing when appropriate to reduce radiation dose to as low as reasonably achievable.    Dictated by Charles Ward MD @ Jul 16 2018  3:54PM    Signed by Dr. Charles Ward @ Jul 16 2018  4:20PM    ROS:  A comprehensive ROS was done, positives are per HPI.        HISTORY REVIEW:  Past Medical History:   Diagnosis Date     Abnormal glandular Papanicolaou smear of cervix 1992     Allergic rhinitis     Allergy, airborne subst     Arthritis      ASCVD (arteriosclerotic cardiovascular disease)      Chronic pain      Chronic pancreatitis (H)     idiopathic, Tx: PPI, antioxidants, pancreatic enzymes     Common migraine      Coronary artery disease      Costochondritis      Costochondritis      Difficulty in walking(719.7)      Dyspnea on exertion      Ectasia, mammary duct     [facility], persistent nipple discharge     Elevated fasting glucose      Gastro-oesophageal reflux disease      Hernia      History of angina      Hyperlipidemia LDL goal < 100      Hypertension goal BP (blood pressure) < 140/90     Essential hypertension     Iron deficiency anemia      Ischemic cardiomyopathy      Menorrhagia      Migraine headaches      Mild persistent asthma      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 Matias, neg w/u, Rx'd w/prednisone     Ventral hernia, unspecified, without mention of obstruction or gangrene 2012     Past Surgical History:   Procedure Laterality Date     C ECHO HEART XTHORACIC,COMPLETE, W/O DOPPLER  04    [facility] WNL, EF 60%     C/SECTION, LOW TRANSVERSE           CARDIAC SURGERY      cardiac stent- recent in 16; 4 other stents     DILATION AND CURETTAGE N/A 2016    Procedure: DILATION AND CURETTAGE;  Surgeon: Nahed Butler MD;  Location: [facility]     HC UGI ENDOSCOPY W EUS  08    Dr. Pastrana, possible chronic pancreatitis, fatty liver     HERNIA REPAIR  2012    done at [facility] [place]     INSERT INTRAUTERINE DEVICE N/A 2016    Procedure: INSERT INTRAUTERINE DEVICE;  Surgeon: Nahed Butler MD;  Location: [facility]     INT UTERINE FIBRIOD EMBOLIZATION  10/29/2014     LAPAROSCOPIC CHOLECYSTECTOMY  08    Dr. Arnol GRUBBS TX, CERVICAL      s/p LEEP     ORBITOTOMY Right 3/15/2016    Procedure: ORBITOTOMY;  Surgeon: Myron Cyr MD;  Location: [facility]     ORBITOTOMY Right 2017    Procedure: ORBITOTOMY;  RIGHT ORBITOTOMY AND BIOPSY;  Surgeon: Charis Holbrook MD;  Location: [facility]     REPAIR PTOSIS Right 2017    Procedure: REPAIR PTOSIS;  RIGHT UPPER LID PTOSIS REPAIR;  Surgeon: Myron Cyr MD;  Location: [facility]     UPPER GI ENDOSCOPY  10/21/08    mild gastritis, Dr. Hidalgo     Family History   Problem Relation Age of Onset     HEART DISEASE Father 50     heart attack     Cerebrovascular Disease Father      Diabetes Father      Hypertension Father      Depression Father      C.A.D. Father      Hypertension Mother      Arthritis Mother      HEART DISEASE Mother      long qt syndrome     Thyroid Disease Mother      C.A.D.  Mother      HEART DISEASE Brother 15     MI at 15, 16.      Diabetes Maternal Uncle      Hypertension Maternal Aunt      Hypertension Maternal Uncle      Arthritis Brother      he passed away, had severe arthritis at age 11     Arthritis Maternal Uncle      Eye Disorder Maternal Uncle      cataracts     Neurologic Disorder Sister      migraines     Neurologic Disorder Sister      migraines     Respiratory Son      asthma     Cerebrovascular Disease Maternal Uncle      C.A.D. Brother      Family History Negative Other      neg for RA, SLE     Unknown/Adopted No family hx of      unknown neurological issues in her family, mother was adopted     Skin Cancer No family hx of      No known family hx of skin cancer     Social History     Social History     Marital status: Single     Spouse name: N/A     Number of children: 1     Years of education: N/A     Occupational History      None      Social History Main Topics     Smoking status: Former Smoker     Packs/day: 0.20     Years: 1.00     Types: Cigarettes     Quit date: 2016     Smokeless tobacco: Never Used     Alcohol use No     Drug use: No     Sexual activity: Not Currently     Other Topics Concern     Parent/Sibling W/ Cabg, Mi Or Angioplasty Before 65f 55m? Yes     Social History Narrative    Balanced Diet - sometimes    Osteoporosis Prevention Measures - Dairy servings per day: 2 servings weekly    Regular Exercise -  Yes Describe walking 4 times a week    Dental Exam - NO    Seatbelts used - Yes    Self Breast Exam - Yes    Abuse: Current or Past (Physical, Sexual or Emotional)- No    Do you have any concerns about STD's -  No    Do you feel safe in your environment - No    Guns stored in the home - No    Sunscreen used - Yes    Lipids -  YES - Date:     Glucose -  YES - Date:     Eye Exam - YES - Date: one year ago    Colon Cancer Screening - No    Hemoccults - NO    Pap Test -  YES - Date: , remote history of LEEP    Mammography -  YES - Date: last spring, would like to discuss, needs a referral to [facility]    DEXA - NO    Immunizations reviewed and up to date - Yes, last td given in  or      Patient Active Problem List   Diagnosis     Seasonal affective disorder (H)     Allergic rhinitis     PCOS (polycystic ovarian syndrome)     Moderate persistent asthma     Chronic pancreatitis (H)     Hypertension goal BP (blood pressure) < 140/90     Common migraine     NSTEMI (non-ST elevated myocardial infarction) (H)     Hyperlipidemia LDL goal <70     ASCVD (arteriosclerotic cardiovascular disease)     GERD (gastroesophageal reflux disease)     Ischemic cardiomyopathy     Hypertensive heart disease     Uterine leiomyoma     Iron deficiency anemia     Costochondritis     Vitamin D deficiency     Breast cancer screening     Spinal stenosis in cervical region     Fibromyalgia     Seronegative rheumatoid arthritis (H)     Type 2 diabetes, HbA1C goal < 8% (H)     Type 2 diabetes mellitus with other specified complication (H)     Hyperlipidemia associated with type 2 diabetes mellitus (H)     Hypertension associated with diabetes (H)     Overweight with body mass index (BMI) 25.0-29.9     Tobacco use disorder     Telogen effluvium     CAD S/P percutaneous coronary angioplasty     Status post coronary angiogram       Pregnancy Hx: She is . All misscarriages were in first trimester. H/o OC use in the past. 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    PMHx, FHx, SHx were reviewed, unchanged.      Outpatient Encounter Prescriptions as of 2018   Medication Sig Dispense Refill     acetaminophen (TYLENOL) 325 MG tablet Take 1-2 tablets (325-650 mg) by mouth every 6 hours as needed for pain (headache) 250 tablet 0     albuterol (2.5 MG/3ML) 0.083% neb solution INL 1 VIAL VIA NEBULIZATION Q 4 TO 6 HOURS PRN  1     albuterol (PROAIR HFA, PROVENTIL HFA, VENTOLIN HFA) 108 (90 BASE) MCG/ACT inhaler Inhale 2 puffs into  the lungs every 6 hours as needed for shortness of breath / dyspnea or wheezing 3 Inhaler 1     ASPIRIN LOW DOSE 81 MG EC tablet Take 1 tablet (81 mg) by mouth daily 90 tablet 3     calcium carbonate (TUMS) 500 MG chewable tablet Take 3-4 chew tab by mouth daily as needed.       clopidogrel (PLAVIX) 75 MG tablet Take 1 tablet (75 mg) by mouth daily 30 tablet 11     cyclobenzaprine (FLEXERIL) 10 MG tablet Take 1 tablet (10 mg) by mouth 2 times daily as needed for muscle spasms 180 tablet 0     desonide (DESOWEN) 0.05 % cream Apply topically 2 times daily       dicyclomine (BENTYL) 20 MG tablet Take 1 tablet (20 mg) by mouth 4 times daily as needed 60 tablet 5     diphenhydrAMINE (BENADRYL) 25 MG capsule TK 1 TO 2 CS PO QHS  4     EPIPEN 2-RIKY 0.3 MG/0.3ML injection INJECT 0.3 MG INTO THE MUSCLE PRF ANAPHYALAXIS  0     ferrous gluconate (FERGON) 324 (38 Fe) MG tablet Take 1 tablet (324 mg) by mouth 2 times daily 180 tablet 1     fexofenadine (ALLEGRA) 180 MG tablet Take 1 tablet by mouth daily as needed. 90 tablet 3     fluocinolone (SYNALAR) 0.01 % solution Apply topically daily as needed       fluocinonide (LIDEX) 0.05 % external solution Apply topically 2 times daily To areas of itch on the scalp as needed. 60 mL 11     folic acid (FOLVITE) 1 MG tablet Take 1 tablet by mouth daily 90 tablet 3     hydroxychloroquine (PLAQUENIL) 200 MG tablet Take 2 tablets (400 mg) by mouth daily 180 tablet 0     insulin glargine (LANTUS) 100 UNIT/ML injection Inject 40 Units Subcutaneous daily (with breakfast)       insulin pen needle (BD MARCK U/F) 32G X 4 MM USE DAILY OR AS DIRECTED 300 each 3     ketoconazole (NIZORAL) 2 % cream Apply topically as needed   3     ketoconazole (NIZORAL) 2 % shampoo Apply topically daily as needed       lisinopril-hydrochlorothiazide (PRINZIDE/ZESTORETIC) 20-25 MG per tablet Take 1 tablet by mouth daily 30 tablet 11     Magnesium Oxide -Mg Supplement 250 MG TABS TK 1 T PO BID. REDUCE IF STOOLS  LOOSEN  11     metFORMIN (GLUCOPHAGE-XR) 500 MG 24 hr tablet Take 2 tablets (1,000 mg) by mouth daily (with dinner) 180 tablet 1     metoprolol succinate (TOPROL-XL) 100 MG 24 hr tablet Take 1 tablet (100 mg) by mouth daily 30 tablet 11     metroNIDAZOLE (NORITATE) 1 % cream Apply topically daily       mometasone (NASONEX) 50 MCG/ACT spray Spray 2 sprays into both nostrils daily       montelukast (SINGULAIR) 10 MG tablet Take 1 tablet (10 mg) by mouth At Bedtime 30 tablet 1     Multiple Vitamin (DAILY-GERALD) TABS Take 1 tablet by mouth daily  0     nitroGLYcerin (NITROSTAT) 0.4 MG sublingual tablet Place 1 tablet (0.4 mg) under the tongue every 5 minutes as needed for chest pain 25 tablet 1     nystatin (MYCOSTATIN) 452555 UNITS TABS tablet TK 2 TS PO BID  0     olopatadine (PATANOL) 0.1 % ophthalmic solution Place 1 drop into both eyes 2 times daily as needed for allergies. 5 mL 12     Ondansetron HCl (ZOFRAN PO)        pravastatin (PRAVACHOL) 40 MG tablet Take 1 tablet (40 mg) by mouth daily 30 tablet 11     ranitidine (ZANTAC) 150 MG tablet Take 1 tablet (150 mg) by mouth 2 times daily 180 tablet 1     spironolactone (ALDACTONE) 50 MG tablet Take 1 tablet (50 mg) by mouth daily . Take additional 0.5 tablets by mouth once daily as needed for weight gain. 45 tablet 11     sucralfate (CARAFATE) 1 GM tablet Take 1 tablet (1 g) by mouth 4 times daily as needed 120 tablet 3     traMADol (ULTRAM) 50 MG tablet Take 1 tablet (50 mg) by mouth every 8 hours as needed for moderate pain 60 tablet 3     vitamin D (ERGOCALCIFEROL) 27127 UNIT capsule Take 1 capsule (50,000 Units) by mouth every 7 days Need a Vitamin D level in 2 months. (Patient taking differently: Take 50,000 Units by mouth every 7 days Need a Vitamin D level in 2 months.) 8 capsule 0     AEROSPAN 80 MCG/ACT AERS INHALE 2 PUFFS PO BID.  RINSE MOUTH AFTERWARDS  4     azelastine (ASTELIN) 0.1 % spray U 2 SPRAYS IEN BID  0     bacitracin ophthalmic ointment Apply  to incision line three times daily. (Patient not taking: Reported on 9/7/2018) 3.5 g 0     BASAGLAR 100 UNIT/ML injection Inject 40 Units Subcutaneous daily (Patient not taking: Reported on 9/7/2018) 12 mL 2     blood glucose monitoring (NO BRAND SPECIFIED) meter device kit Use to test blood sugar 4 X times daily or as directed. 1 kit 0     blood glucose monitoring (NO BRAND SPECIFIED) test strip 1 strip by In Vitro route 4 times daily Test as directed. Please dispense three months and three months of refills. Thank you. 360 each 3     blood glucose monitoring (ONE TOUCH DELICA) lancets Use to test blood sugars 4 times daily or as directed. 4 Box 3     COMPRESSION STOCKINGS 2 each daily (Patient not taking: Reported on 9/7/2018) 4 each 2     COMPRESSION STOCKINGS 2 each daily Apply one 10-15 mmHg compression stocking to each lower extgmierty during the day and remove before bedtime. (Patient not taking: Reported on 9/7/2018) 4 each 2     lidocaine (XYLOCAINE) 5 % ointment Apply 1 g topically 2 times daily as needed for moderate pain (Patient not taking: Reported on 9/7/2018) 50 g 5     SYMBICORT 80-4.5 MCG/ACT Inhaler INHALE 2 PUFFS PO BID RINSE AND EXPECTORATE AFTER  3     No facility-administered encounter medications on file as of 9/7/2018.        Allergies   Allergen Reactions     Amoxicillin-Pot Clavulanate      Augmentin      Unknown possible hives and edema     Azithromycin      Diatrizoate Other (See Comments)     Pt wants this listed because she is allergic to shellfish      Imitrex [Sumatriptan]      Severe face/neck/chest tightness and flushing side effects      Penicillins Hives     Unknown      Pork Allergy      Stomach pain, cramping, diarrhea, itching, nausea and headaches     Shellfish Allergy Hives and Swelling     Sulfa Drugs Hives and Swelling     Zithromax [Azithromycin Dihydrate] Swelling     Unknown          Ph.E:    Vitals:    09/07/18 1513   BP: 112/72   Pulse: 73   Temp: 98.1  F (36.7  C)  "  TempSrc: Oral   SpO2: 99%   Weight: 71.3 kg (157 lb 3.2 oz)   Height: 1.6 m (5' 3\")           Constitutional: WD/WN. Pleasant. In no acute distress. Obese. Son present.  Eyes: Swelling around R eye significantly worse since last visit pushing the R eye down, ptosis is present. EOMI  HEENT: No oral ulcers or thrush. Normal salivary pool.  Neck: No cervical LAP, + thyromegaly  Chest: Clear to auscultation bilaterally  CV: RRR, no murmurs/ rubs or gallops. No edema, clubbing or cyanosis.   GI: Abdomen is soft and non tender.  MS: No synovitis or joint tenderness. Cool joints. No joint deformities. Full ROM of the joints. No nodules. +Fibromyalgia trigger points.  Skin: No livedo, periungual erythema, digital ulcers or nail changes. + alopecia with scales, facial malar erythema (looks like seborrhoic dermatitis).  Neuro: A&O x 3. Grossly non focal, muscular power 5/5 in all ext  Psych: NL affect and mood    Assessment/ plan:    1-Seropositive non-erosive RA (arthritis, AM stiffness, high CRP, RF 26 but re-check neg 3/2015 on HCQ) Dx 5/2013, FMS, new pleuritic CP 3/20-15-5/2015 resolved on steroids. She is on  mg bid since 5/2014. She tolerates it well and it's helping some but not enough to control all her pain. Continues having flare ups of joint pain, swelling also has FMS. Joint sx are getting worse. Had high ESR/CRP in 3/2015 and new pleuritic CP between 3/2015-5/2015 which resolved after taking medrol dose pack prescribed 5/20/2015. Her pleuritic CP could be related to her RA. Then stopped tramadol as it blames it for recent episodes of CP.    In the past, MTX was recommended, she declined.    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 Etiology of mass unclear, but it's inflammatory and related to pt's underlying autoimmune disease " (inflammatory arthritis, serositis). 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    Her work up showed borderline + ALLI and slightly elevated MPO. I told her prednisone is not good for her diabetes and weight gain. Recommended a trial of MTX as next step. Discussed risks on details. I called her neuro-ophthalmologist at last visit who agreed with trial of MTX (she suspects pseudo-sarcoidosis or sarcoid like disease but no granuloma and ACE not elevated).     Unfortunately despite all my efforts to explain risks vs benefits (more than risks) of MTX as steroid sparing gent, Janine declined to try MTX. I was very concerned about her R orbital inflammatory mass as there is high chance of flare up off steroids and steroid causes her weigh gain and uncontrolled diabetes. I even offered her other steroid sparing gents like imuran. She declined that as well.    Her inflammation around R orbit has recurred now. On 4/7/2017, I spent quite amount of time discussing a trial of MTX. After discussing risks/benefits, she agreed to try it.     She is s/p Tx with MTX 10 mg po qwk 4/2017-5/2017. No benefits and more GI SEs, more hair loss and headaches since start of MTX. No benefits. I called and spoke with her neuro-ophthalmologist who recommended a second opinion from neuro-ophthalmology at the . I suspect her presentation to be ANCA vasculitis related but wanted to repeat imaging and get neuro-ophthalmology eval here. She was recommended to have repeat biopsy to r/o lymphoma.    In 5/2017, prescribed her prednisone 40-30-20-10 mg a day each for 3 days then stop (she declined higher dose and longer taper despite my concern for worsening swelling around orbit), Her R campos-orbital edema significantly improved. MTX was stopped in 5/2017 given side effects. Plan was to start imuran 50 mg po qd (NL TPMT); however we decided to hold off till she gets biopsy done.    Repeat R lacrimal  gland biopsy in 8/2017 showed non specific inflammation, it ruled out lymphoma. Her R orbital swelling is improved but still present. After her biopsy I contacted her to schedule a f/u visit with me to discuss plan of care but she declined till today's visit.    She did not tolerate AZA because of GI SEs.    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     This is definitely an inflammatory process and is steroid responsive, she had local kenalog inj in 8/2017 at the time of biopsy which has helped. Given her diabetes and long term SE of prednisone, highly recommend steroid sparing agent to prevent progression/recurrence of R campos-orbital swelling. Since she did not tolerate MTX and AZA, recommend rituximab as next step.     At last visit in 2/2018, I spent 30 minutes discussing test results, plan of care, rituximab SEs including rare risk of PML. She declined rituximab with concern for SEs.    Unfortunately lost f/u sine 2/2018, today is back with her R eye being much worse, swelling around R orbit is severe and is pushing down her eye. She decided to see an ophthalmologist at [place], was seen 8/29, was told to have GPA, reportedly had labs done and was told that she would require surgery.    Janine is frustrated with her eye condition, saying nobody told her that she has GPA or Wegener's which is a serious condition and people could die from it.    I reminded her that I discussed the Dx of ANCA-vasculitis which is just another name for Wegener's with her many times but she always declined induction Tx rituximab including today. She said she wants to wait and hear from her ophthalmologist at [place]. I called asked for records, also left Dr. Shah a message to call me back (his nurse said the  doctor is out on vacation till 9/18). I am very worried for Janine as she has this Dx for a while but refused rituximab tx, She can't tolerate more than 30 mg of prednisone a day and I asked her to stay on 30 till she hears back from me.      PLAN:      CT chest/abdomen/pelvis 4/2017 was neg for malignancy. 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 Repeat MPO was positive in 6/2017.    Continue accupuncture.     My impression is that her arthralgias are a combination of IA, fibromyalgia and chronic pain.  Stopped HCQ at last visit, shortly after resumed taking it as arthralgia recurred. Continue HCQ. Eye exam is updated.      2-Fad pads. She was seen by endocrinology and cushing was ruled out in 4/2014. Was advised to do f/u for enlarged thyroid/thyroid nodules.    3-Hair loss. F/U with dermatology    4-Chronic pain. F/U with pain clinic    5-Concerns of subclinical hyperthyroidism: TSH is barely minimal, chart review shows that those lowest levels are since April 2014. At last visit, discussed Endocrinology ref which pt wants to hold off in this visit.     6-Vit D deficiency. Was replaced with vit D 50, 000 units po qwk x 12 wk then 2000 units qd      PLAN: Follow up in 3-4 months    MEDICATION CHANGES: as above      No orders of the defined types were placed in this encounter.              HPI      ROS      Physical Exam              HPI      ROS      Physical Exam

## 2018-09-07 NOTE — MR AVS SNAPSHOT
After Visit Summary   9/7/2018    Janine Cornell    MRN: [id number]           Patient Information     Date Of Birth          1965        Visit Information        Provider Department      9/7/2018 3:00 PM Majo Mckinnon MD [facility] Rheumatology        Care Instructions    Follow up with Esequiel Shah M.D      Prednisone 30 mg a day till you hear back from me      Return in 3-4 months          Follow-ups after your visit        Your next 10 appointments already scheduled     Jan 11, 2019  3:30 PM CST   (Arrive by 3:15 PM)   Return Visit with Majo Mckinnon MD   [facility] Rheumatology ([facility])    [address]  [address]  [facility] [address] [phone]              Who to contact     If you have questions or need follow up information about today's clinic visit or your schedule please contact [facility] RHEUMATOLOGY directly at [phone].  Normal or non-critical lab and imaging results will be communicated to you by MyChart, letter or phone within 4 business days after the clinic has received the results. If you do not hear from us within 7 days, please contact the clinic through [id number] or phone. If you have a critical or abnormal lab result, we will notify you by phone as soon as possible.  Submit refill requests through [employer] or call your pharmacy and they will forward the refill request to us. Please allow 3 business days for your refill to be completed.          Additional Information About Your Visit        [id number] Information     [employer] gives you secure access to your electronic health record. If you see a primary care provider, you can also send messages to your care team and make appointments. If you have questions, please call your primary care clinic.  If you do not have a primary care provider, please call [phone] and they will assist you.        Care EveryWhere ID     This is your Care EveryWhere ID. This could be used by other  "organizations to access your [facility] medical records  [id number]        Your Vitals Were     Pulse Temperature Height Pulse Oximetry BMI (Body Mass Index)       73 98.1  F (36.7  C) (Oral) 1.6 m (5' 3\") 99% 27.85 kg/m2        Blood Pressure from Last 3 Encounters:   09/07/18 112/72   07/25/18 123/85   07/18/18 121/79    Weight from Last 3 Encounters:   09/07/18 71.3 kg (157 lb 3.2 oz)   07/25/18 74.4 kg (164 lb)   07/18/18 76.8 kg (169 lb 6.4 oz)              Today, you had the following     No orders found for display         Today's Medication Changes          These changes are accurate as of 9/7/18  4:25 PM.  If you have any questions, ask your nurse or doctor.               These medicines have changed or have updated prescriptions.        Dose/Directions    vitamin D 40572 UNIT capsule   Commonly known as:  ERGOCALCIFEROL   This may have changed:  additional instructions   Used for:  Vitamin D deficiency        Dose:  43254 Units   Take 1 capsule (50,000 Units) by mouth every 7 days Need a Vitamin D level in 2 months.   Quantity:  8 capsule   Refills:  0                Primary Care Provider    Lauren Francis RN       No address on file        Equal Access to Services     MANDA QUINN : Dora June, wasolda janene, qaybta kaalmada adedeondreda, gagandeep vincent. So [facility] [phone].    ATENCIÓN: Si habla español, tiene a burch disposición servicios gratuitos de asistencia lingüística. Llame al [phone].    We comply with applicable federal civil rights laws and [place] laws. We do not discriminate on the basis of race, color, national origin, age, disability, sex, sexual orientation, or gender identity.            Thank you!     Thank you for choosing [facility]  for your care. Our goal is always to provide you with excellent care. Hearing back from our patients is one way we can continue to improve our services. Please take a few minutes to " complete the written survey that you may receive in the mail after your visit with us. Thank you!             Your Updated Medication List - Protect others around you: Learn how to safely use, store and throw away your medicines at www.disposemymeds.org.          This list is accurate as of 9/7/18  4:25 PM.  Always use your most recent med list.                   Brand Name Dispense Instructions for use Diagnosis    acetaminophen 325 MG tablet    TYLENOL    250 tablet    Take 1-2 tablets (325-650 mg) by mouth every 6 hours as needed for pain (headache)    Other chronic pain, Headache(784.0)       AEROSPAN 80 MCG/ACT Aers oral inhaler   Generic drug:  flunisolide HFA      INHALE 2 PUFFS PO BID.  RINSE MOUTH AFTERWARDS        * albuterol 108 (90 Base) MCG/ACT inhaler    PROAIR HFA/PROVENTIL HFA/VENTOLIN HFA    3 Inhaler    Inhale 2 puffs into the lungs every 6 hours as needed for shortness of breath / dyspnea or wheezing        * albuterol (2.5 MG/3ML) 0.083% neb solution      INL 1 VIAL VIA NEBULIZATION Q 4 TO 6 HOURS PRN        ASPIRIN LOW DOSE 81 MG EC tablet   Generic drug:  aspirin     90 tablet    Take 1 tablet (81 mg) by mouth daily    Hyperlipidemia LDL goal <70, HTN (hypertension)       azelastine 0.1 % nasal spray    ASTELIN     U 2 SPRAYS IEN BID        bacitracin ophthalmic ointment     3.5 g    Apply to incision line three times daily.    Postoperative eye state       * insulin glargine 100 UNIT/ML injection    LANTUS     Inject 40 Units Subcutaneous daily (with breakfast)        * BASAGLAR 100 UNIT/ML injection     12 mL    Inject 40 Units Subcutaneous daily    Type 2 diabetes mellitus without complication (H)       blood glucose monitoring lancets     4 Box    Use to test blood sugars 4 times daily or as directed.    Type 2 diabetes, HbA1c goal < 7% (H)       blood glucose monitoring meter device kit    no brand specified    1 kit    Use to test blood sugar 4 X times daily or as directed.    Type 2  diabetes, HbA1c goal < 7% (H)       blood glucose monitoring test strip    no brand specified    360 each    1 strip by In Vitro route 4 times daily Test as directed. Please dispense three months and three months of refills. Thank you.    Type 2 diabetes, HbA1c goal < 7% (H)       clopidogrel 75 MG tablet    PLAVIX    30 tablet    Take 1 tablet (75 mg) by mouth daily        * COMPRESSION STOCKINGS     4 each    2 each daily    Bilateral lower extremity edema, Venous incompetence       * COMPRESSION STOCKINGS     4 each    2 each daily Apply one 10-15 mmHg compression stocking to each lower extgmierty during the day and remove before bedtime.    Venous incompetence, Bilateral lower extremity edema       cyclobenzaprine 10 MG tablet    FLEXERIL    180 tablet    Take 1 tablet (10 mg) by mouth 2 times daily as needed for muscle spasms    Fibromyalgia       DAILY-GERALD Tabs      Take 1 tablet by mouth daily        desonide 0.05 % cream    DESOWEN     Apply topically 2 times daily        dicyclomine 20 MG tablet    BENTYL    60 tablet    Take 1 tablet (20 mg) by mouth 4 times daily as needed    Other irritable bowel syndrome       diphenhydrAMINE 25 MG capsule    BENADRYL     TK 1 TO 2 CS PO QHS        EPIPEN 2-RIKY 0.3 MG/0.3ML injection 2-pack   Generic drug:  EPINEPHrine      INJECT 0.3 MG INTO THE MUSCLE PRF ANAPHYALAXIS        ferrous gluconate 324 (38 Fe) MG tablet    FERGON    180 tablet    Take 1 tablet (324 mg) by mouth 2 times daily    AILEEN (iron deficiency anemia)       fexofenadine 180 MG tablet    ALLEGRA    90 tablet    Take 1 tablet by mouth daily as needed.    Chronic rhinitis       fluocinolone 0.01 % solution    SYNALAR     Apply topically daily as needed        fluocinonide 0.05 % solution    LIDEX    60 mL    Apply topically 2 times daily To areas of itch on the scalp as needed.    Telogen effluvium       folic acid 1 MG tablet    FOLVITE    90 tablet    Take 1 tablet by mouth daily    Inflammatory  arthritis       hydroxychloroquine 200 MG tablet    PLAQUENIL    180 tablet    Take 2 tablets (400 mg) by mouth daily    Inflammatory arthritis       insulin pen needle 32G X 4 MM    BD MARCK U/F    300 each    USE DAILY OR AS DIRECTED    Type 2 diabetes, HbA1c goal < 7% (H)       * ketoconazole 2 % shampoo    NIZORAL     Apply topically daily as needed        * ketoconazole 2 % cream    NIZORAL     Apply topically as needed        lidocaine 5 % ointment    XYLOCAINE    50 g    Apply 1 g topically 2 times daily as needed for moderate pain    Fibromyalgia, Rheumatoid arthritis involving both wrists with positive rheumatoid factor (H)       lisinopril-hydrochlorothiazide 20-25 MG per tablet    PRINZIDE/ZESTORETIC    30 tablet    Take 1 tablet by mouth daily    Hypertension, unspecified type       Magnesium Oxide -Mg Supplement 250 MG tablet      TK 1 T PO BID. REDUCE IF STOOLS LOOSEN        metFORMIN 500 MG 24 hr tablet    GLUCOPHAGE-XR    180 tablet    Take 2 tablets (1,000 mg) by mouth daily (with dinner)    Type 2 diabetes mellitus not at goal (H)       metoprolol succinate 100 MG 24 hr tablet    TOPROL XL    30 tablet    Take 1 tablet (100 mg) by mouth daily    Benign essential hypertension       metroNIDAZOLE 1 % cream    NORITATE     Apply topically daily        mometasone 50 MCG/ACT spray    NASONEX     Spray 2 sprays into both nostrils daily        montelukast 10 MG tablet    SINGULAIR    30 tablet    Take 1 tablet (10 mg) by mouth At Bedtime    Uncomplicated asthma, unspecified asthma severity       nitroGLYcerin 0.4 MG sublingual tablet    NITROSTAT    25 tablet    Place 1 tablet (0.4 mg) under the tongue every 5 minutes as needed for chest pain    NSTEMI (non-ST elevated myocardial infarction) (H)       nystatin 325984 units Tabs tablet    MYCOSTATIN     TK 2 TS PO BID        olopatadine 0.1 % ophthalmic solution    PATANOL    5 mL    Place 1 drop into both eyes 2 times daily as needed for allergies.     Chronic rhinitis       pravastatin 40 MG tablet    PRAVACHOL    30 tablet    Take 1 tablet (40 mg) by mouth daily    Coronary artery disease involving native heart without angina pectoris, unspecified vessel or lesion type       ranitidine 150 MG tablet    ZANTAC    180 tablet    Take 1 tablet (150 mg) by mouth 2 times daily    Gastritis       spironolactone 50 MG tablet    ALDACTONE    45 tablet    Take 1 tablet (50 mg) by mouth daily . Take additional 0.5 tablets by mouth once daily as needed for weight gain.    Hypertension goal BP (blood pressure) < 140/90       sucralfate 1 GM tablet    CARAFATE    120 tablet    Take 1 tablet (1 g) by mouth 4 times daily as needed    Abdominal pain, epigastric       SYMBICORT 80-4.5 MCG/ACT Inhaler   Generic drug:  budesonide-formoterol      INHALE 2 PUFFS PO BID RINSE AND EXPECTORATE AFTER        traMADol 50 MG tablet    ULTRAM    60 tablet    Take 1 tablet (50 mg) by mouth every 8 hours as needed for moderate pain    Undifferentiated connective tissue disease (H)       TUMS 500 MG chewable tablet   Generic drug:  calcium carbonate      Take 3-4 chew tab by mouth daily as needed.        vitamin D 34906 UNIT capsule    ERGOCALCIFEROL    8 capsule    Take 1 capsule (50,000 Units) by mouth every 7 days Need a Vitamin D level in 2 months.    Vitamin D deficiency       ZOFRAN PO           * Notice:  This list has 8 medication(s) that are the same as other medications prescribed for you. Read the directions carefully, and ask your doctor or other care provider to review them with you.

## 2018-09-07 NOTE — LETTER
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 Cornell is a 49 yo AAF who was referred to our clinic for evaluation and management of her joint pain in setting of positive RF 26.    Her joint symptoms first started more than a year ago, but over last 6-8 months fluctuating some good and bad days . All her joints are involved. Over last 5 months, hands became swollen, warm and painful. Tylenol does not help. Ketoprofen did not help. Was told to avoid NSAIDs given ACS. Reports AM/PM stiffness x 2-3 hr, can't make full fist when wakes up in AM.    She feels tired all the time. Reports worsening hair loss and unchanged  facial rash. Gets red sore flaky rash over cheeks across her face which is intermittent diagnosed Rosacea and is prescribed metronidazole gel which she has not started using. Reports occasional oral ulcers. Hands feel cold with red discoloration last winter. Has occasional dysphagia to both solids and liquid. Has dry eyes, is using OTC allergy eyedrops. Has chronic abdominal pain. Has h/o pancreatitis. Sometime has anxiety. Occasionally has numbness, tingling in fingers/toes. Has back and spine issues, her whole back and neck hurts, different areas at different time. She is wondering if she has FMS. Has hard time sleeping, has never been diagnosed with BRENNA. She does not know if he snores. She was found to have slightly positive RF in 2/2013. Has microcytic anemia.     Her arthralgia gets worse with drop in temperature. Reports body ache. Activity makes her pain worse. Her joint swelling isintermittent. Her body pain and joint pain is the same. Reports AM stiffness x 3 hr.    She has been doing acupuncture x few months and it is helping with her pain. She thinks  that the combination of plaquenil and acupuncture is helpful but overall she notice 30% in her symptoms relief.     Takes tylenol and tramadol on occasion for pain.    She decided to use different formulation of metformin which helped with her abdominal pain. I referred her to GI for evaluation of elevated lipase and abdominal pain. She decided to see GI in the future.       She is on  mg qd since 5/2014, tolerates it well and it helps her some. Denies taking any gabapentin due to chest pain and headches. She has had referral her for water aerobics, but she can't begin until she's healed from recent surgery in 10/2014. She thinks HCQ is helping but not enough to control all her pain, reports 2-3 hr of AM stiffness with ongoing diffuse arthralgia/myalgia along with intermittent swelling of hands. She does see her acupuncture person and it helps with her pain, has not started water aerobics yet. Has got her flu shot.       10/2015: Reports having pleuritic CP in 3/2015, was prescribed Lidoderm patches first. It did not help. Took prednisone (?dose) by her own, pain got better but it recurred off prednisone and gradually got worse to the point that she could not stand the pain anymore, was seen in ER in 5/2015, was treated with medrol dose pack which helped. Reports pain over hips, knees, ankles and fingers. Pain gets worse with walking. Reports myalgia, swelling of the arms. Pain over neck, shoulders. Has AM stiffness x 3-4 hr. Fingers swell up and become painful. Can't remember if steroid helped with joint pain. She had headaches, severe CP when she took tramadol and gabapentin and stopped taking them, sx resolved but she can't tell which one caused SEs but thinks that probably it was gabapentin. She has good days and tries to be more active but activity aggravates the pain. Headaches are intermittent. HCQ helps some not enough to control all the pain. No HCQ SEs. Had eye exam around July 2015. Flexeril helps  but PCP wants to change flexeril to zanaflex, reporting that she is NOT comfortable with the change. Acupuncture still helps an she continued to  do that. Concerned about fat pads she has in supraclavicular area, has h/o thyroid nodules. Continues having hair loss, takes iron for iron deficiency.     2/2016: She has 2 major complaints today, increased joint pain/swelling in hands/feet and recent Dx of optic neuritis in R eye, reports having similar problem about 20 yr ago, now recurred. Has a spot in R eye vision x long term, was seen by ophthalmology few days ago and was told to have optic neuritis. Gets joint pain, muscle pain. Can't  objects, hands are swollen. Knees, hips and ankles are painful. Reports more arthralgia. AM stiffness/ pain is 3-4 hr. She wants me to talk to her ophthalmologist directly.      She had botox inj for migraines which did not help her. She is going to have angiogram next wk, had to take more nitro for CP recently.       4/29/2016: She reports being on steroid taper x 3 since last visit. Reportedly, had orbit MRI, it showed swelling/inflamamtion of R lacrimal glands. She had painful swelling of her R eye, cause her headaches. Botox inj made her headaches worse. She is being dealing with this since 1/2016, with severe headaches and pain/swelling around R eye. Had biopsy in 3/2016. She is frustrated as prednisone causes her weight gain and her BG is difficult to control on it.    5/2016: At last visit, was prescribed MTX 10 mg po qwk to take along with FA 1 mg qd. She decided not to take MTX, is afraid of side effects, believes all these medications would harm her. She also believes that botox caused her inflammation around R eye. No major flare up of per-orbital inflamamtion since last visit but continues to have swelling around her R eye.      11/2016: Reports diffuse body ache, arthralgia, myalgia especially with weather change. No major flare up of campos-orbital inflammation but  her face/around R eye swells up from time to time. Reports 2 episodes of CP over past 2 mo, nitro sometimes helps and sometimes does not help. She has asthma and costochondritis and she has hard time to distinguish the origin of pain. Sometimes knees and fingers swell up. Her stiffness is sometimes all day.    4/2017:  Reports having sinusitis since last visit. Her R eye is dry and is using eyedrops to keep it moist. Reports having pain in ears. Was treated with antibiotics by PCP, it got better then it got worse. Reports catching infections easily. Then started having pressure over eyes with pain/headaches (exact start date is unknown). Pain gradually got worse and became persistent. Had sinus CT.     4/7/2017: Having a flare up of swelling, pain around her R orbit. Has not tried MTX yet.      5/2017: On MTX 10 mg po qwk since 4/7/2017, reports increased stomach pain and nausea and new headaches since start of MTX and it's not helping. Pain/swelling around R orbit is worse.    6/2017: She finished prednisone taper prescribed at last visit, R campos-orbital swelling significantly improved. 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    2/2018: R campos-orbital swelling has improved. Repeat R lacrimal gland biopsy in 8/2017 showed non specific inflammation with no lymphoma. She is off steroid. Did not tolerate AZA due to N/V and abdominal pain.      Is back with recurrence of R campos-orbital sweling x past 2 months. Pain really got worse over past 3wk, requries       Today: Declined ritiximab at last visit, lost f/u since 2/2018. Went to [place] and was seen on 8/29 by Dr. 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 Reportedly he ordered labs and recommended surgery since the inflammation of the R eye is back and is pushing the eye down. Janine  took some prednsione 30 mg every day few days a go for pain.    Her records were reviewed.        Component      Latest Ref Rng 2/28/2013 2/28/2013          12:14 PM 12:14 PM   WBC      4.0 - 11.0 10e9/L     RBC Count      3.8 - 5.2 10e12/L     Hemoglobin      11.7 - 15.7 g/dL     Hematocrit      35.0 - 47.0 %     MCV      78 - 100 fl     MCH      26.5 - 33.0 pg     MCHC      31.5 - 36.5 g/dL     RDW      10.0 - 15.0 %     Platelet Count      150 - 450 10e9/L     Specimen Description           Lyme Screen IgG and IgM           Vitamin D Deficiency screening      30 - 75 ug/L     Ferritin      10 - 300 ng/mL     Sed Rate      0 - 20 mm/h     ALLI Screen by EIA      <1.0     Rheumatoid Factor      0 - 14 IU/mL     CK Total      30 - 225 U/L  78   Uric Acid      2.5 - 7.5 mg/dL 6.7      Component      Latest Ref Rng 2/28/2013          12:14 PM   WBC      4.0 - 11.0 10e9/L    RBC Count      3.8 - 5.2 10e12/L    Hemoglobin      11.7 - 15.7 g/dL    Hematocrit      35.0 - 47.0 %    MCV      78 - 100 fl    MCH      26.5 - 33.0 pg    MCHC      31.5 - 36.5 g/dL    RDW      10.0 - 15.0 %    Platelet Count      150 - 450 10e9/L    Specimen Description       Serum   Lyme Screen IgG and IgM       Test value: <0.75....Interpretation: Negative....If you highly suspect Lyme . . .   Vitamin D Deficiency screening      30 - 75 ug/L    Ferritin      10 - 300 ng/mL    Sed Rate      0 - 20 mm/h    ALLI Screen by EIA      <1.0    Rheumatoid Factor      0 - 14 IU/mL    CK Total      30 - 225 U/L    Uric Acid      2.5 - 7.5 mg/dL      Component      Latest Ref Rng 2/28/2013 2/28/2013 2/28/2013 2/28/2013          12:14 PM 12:14 PM 12:14 PM 12:14 PM   WBC      4.0 - 11.0 10e9/L       RBC Count      3.8 - 5.2 10e12/L       Hemoglobin      11.7 - 15.7 g/dL       Hematocrit      35.0 - 47.0 %       MCV      78 - 100 fl       MCH      26.5 - 33.0 pg       MCHC      31.5 - 36.5 g/dL       RDW      10.0 - 15.0 %       Platelet Count      150 - 450  10e9/L       Specimen Description             Lyme Screen IgG and IgM             Vitamin D Deficiency screening      30 - 75 ug/L       Ferritin      10 - 300 ng/mL    10   Sed Rate      0 - 20 mm/h   23 (H)    ALLI Screen by EIA      <1.0  <1.0 . . .     Rheumatoid Factor      0 - 14 IU/mL 26 (H)      CK Total      30 - 225 U/L       Uric Acid      2.5 - 7.5 mg/dL         Component      Latest Ref Rng 2/28/2013 2/28/2013          12:14 PM 12:14 PM   WBC      4.0 - 11.0 10e9/L 14.1 (H)    RBC Count      3.8 - 5.2 10e12/L 4.55    Hemoglobin      11.7 - 15.7 g/dL 10.7 (L)    Hematocrit      35.0 - 47.0 % 33.3 (L)    MCV      78 - 100 fl 73 (L)    MCH      26.5 - 33.0 pg 23.5 (L)    MCHC      31.5 - 36.5 g/dL 32.1    RDW      10.0 - 15.0 % 18.1 (H)    Platelet Count      150 - 450 10e9/L 407    Specimen Description           Lyme Screen IgG and IgM           Vitamin D Deficiency screening      30 - 75 ug/L  32   Ferritin      10 - 300 ng/mL     Sed Rate      0 - 20 mm/h     ALLI Screen by EIA      <1.0     Rheumatoid Factor      0 - 14 IU/mL     CK Total      30 - 225 U/L     Uric Acid      2.5 - 7.5 mg/dL          MRI CERVICAL SPINE WITHOUT CONTRAST 4/3/2013 12:47 PM    HISTORY: Cervicalgia. Degeneration of cervical intervertebral disc.  MRI cervical spine. Evaluate bilateral supraclavicular lymph nodes.  Clinical enlargement.    TECHNIQUE: Multiplanar multisequence MRI of the cervical spine  without gadolinium contrast.    COMPARISON: None.    FINDINGS: The patient has a developmentally small central canal.  Images of the cervical cord reveals small areas of T2 hyperintensity  within the cervical cord. There is a small area of high signal  intensity at the C1 level. There is also a small area of high signal  intensity at the C6-C7 level. The area of high signal at C6-C7 is  located at an area of central spinal stenosis. This may be due to  myelomalacia. The area of high signal at C1-C2 is indeterminate.    C2-C3:  Normal disc, facet joints, spinal canal and neural foramina.    C3-C4: Normal disc, facet joints, spinal canal and neural foramina.    C4-C5: Normal disc, facet joints, spinal canal and neural foramina.    C5-C6: There is degeneration of the disc. There is a mild annular  disc bulge. The central canal is mild-moderately narrowed. The  residual AP diameter of the central spinal canal measures  approximately 9 mm.    C6-C7: There is degeneration of the disc. There is loss of disc space  height. There is a diffuse annular disc bulge. There are associated  posterior osteophytes. There are severe central spinal stenosis at  this level. The residual AP diameter of the central spinal canal is  only about 6 mm. There is significant flattening of the cord. There  is high signal in the cord at this level consistent with  myelomalacia. There is moderate to severe right foraminal stenosis  due to and uncinate spur.    C7-T1: Normal disc, facet joints, spinal canal and neural foramina.            Result Impression       IMPRESSION:  1. Severe central spinal stenosis at C6-C7 due to a developmentally  small canal and due to a bulging disc and associated posterior  osteophyte. There is flattening of the cord at this level and high  signal in the cord at this level consistent with myelomalacia.  2. There is a small, indeterminate 2-3 mm area of high signal  intensity in the cord at the C1 level. This could be due to gliosis  secondary to a previous infectious or inflammatory process.  Demyelinating disease could also have a similar appearance. Clinical  correlation suggested.    GAIL MORE MD     MRI LEFT UPPER EXTREMITY NON-JOINT WITHOUT CONTRAST, MRI RIGHT UPPER  EXTREMITY NON-JOINT WITHOUT CONTRAST April 3, 2013 1:32 PM    HISTORY: Cervicalgia. Degeneration of cervical intervertebral disc.    TECHNIQUE: Multiplanar, multisequence imaging of the brachial plexus  was performed without gadolinium contrast  enhancement.    COMPARISON: None.    FINDINGS: No mass lesions are seen. No inflammatory process is  identified. The roots, trunks, branches, and divisions of both the  right and left brachial plexus appear normal. No adenopathy is seen.  The anterior scalene muscles and the middle scalene muscles appear  normal. Nodules are seen within the thyroid gland. The largest nodule  is seen in the left lobe of the thyroid. This measures about 1.8 cm  in diameter.            Result Impression       IMPRESSION:  1. Both the right and left brachial plexus regions appear normal.  2. Thyroid nodules. The largest nodule is in the left lobe of the  thyroid. It measures about 1.8 cm in diameter.       XR WRIST BILATERAL G/E 3 VIEWS    Narrative:        EXAMINATION:  1. Each hand 3 views  2. Each wrist 3 views     DATE: 5/1/2013     HISTORY: Arthropathy with concern for rheumatoid.     FINDINGS: 3 views of each hand and 3 views of each wrist are obtained  without prior for comparison. Alignment is normal. There is no  fracture or acute bone abnormality. No distinct erosions are seen.  Some spurring of the distal radial ulnar joint is noted on the right.       Impression:     IMPRESSION:  1. No evidence of an inflammatory arthropathy involving either hand  or wrist.     VIELKA GUEVARA MD         XR FOOT BILATERAL G/E 3 VIEWS    Narrative:        EXAMINATION:  1. Each foot 3 views  2. Each ankle 3 views  3. Sacroiliac joint series     DATE: 5/1/2013     HISTORY: Arthropathy; concern for rheumatoid.     FINDINGS: No prior for comparison.     A frontal and bilateral oblique evaluation of the sacroiliac joints  shows no malalignment. Some patchy sclerosis is identified along the  central aspect of both sacroiliac joints, which is favored to be  degenerative. No distinct erosions are seen. The visualized portion  of the hip joint spaces appear maintained, with no erosive changes  identified. Mild endplate spurring is noted in the lower  lumbar  spine.     3 views of each foot and 3 views of each ankle show no evidence of  acute fracture or dislocation. The metatarsal phalangeal,  tarsometatarsal and intertarsal joint spaces appear maintained. No  erosions are identified. There is no sign of acroosteolysis. The  ankle mortise and talar dome are intact bilaterally. Minimal spurring  is noted along the tip of the medial malleolus bilaterally.       Impression:     IMPRESSION:  1. Patchy sclerosis identified along the central aspect of both  sacroiliac joints, which is favored to be degenerative. No distinct  erosions are seen.  2. No evidence of an inflammatory arthropathy in either foot or ankle.     VIELKA GUEVARA MD     5/2013  CBC WITH PLATELETS DIFFERENTIAL       Component Value Range    WBC 11.3 (*) 4.0 - 11.0 10e9/L    RBC Count 4.56  3.8 - 5.2 10e12/L    Hemoglobin 11.1 (*) 11.7 - 15.7 g/dL    Hematocrit 34.3 (*) 35.0 - 47.0 %    MCV 75 (*) 78 - 100 fl    MCH 24.3 (*) 26.5 - 33.0 pg    MCHC 32.4  31.5 - 36.5 g/dL    RDW 16.1 (*) 10.0 - 15.0 %    Platelet Count 315  150 - 450 10e9/L    Diff Method Automated Method      % Neutrophils 71.6  40 - 75 %    % Lymphocytes 20.9  20 - 48 %    % Monocytes 4.3  0 - 12 %    % Eosinophils 2.8  0 - 6 %    % Basophils 0.2  0 - 2 %    % Immature Granulocytes 0.2  0 - 0.4 %    Absolute Neutrophil 8.1  1.6 - 8.3 10e9/L    Absolute Lymphocytes 2.4  0.8 - 5.3 10e9/L    Absolute Monoctyes 0.5  0.0 - 1.3 10e9/L    Absolute Eosinophils 0.3  0.0 - 0.7 10e9/L    Absolute Basophils 0.0  0.0 - 0.2 10e9/L    Abs Immature Granulocytes 0.0  0 - 0.03 10e9/L   AMYLASE       Component Value Range    Amylase 103  30 - 110 U/L   COMPREHENSIVE METABOLIC PANEL       Component Value Range    Sodium 144  133 - 144 mmol/L    Potassium 3.8  3.4 - 5.3 mmol/L    Chloride 105  94 - 109 mmol/L    Carbon Dioxide 23  20 - 32 mmol/L    Anion Gap 17  6 - 17 mmol/L    Glucose 173 (*) 60 - 99 mg/dL    Urea Nitrogen 13  5 - 24 mg/dL    Creatinine  0.83  0.52 - 1.04 mg/dL    GFR Estimate 74  >60 mL/min/1.7m2    GFR Estimate If Black 90  >60 mL/min/1.7m2    Calcium 9.7  8.5 - 10.4 mg/dL    Bilirubin Total 0.4  0.2 - 1.3 mg/dL    Albumin 4.3  3.9 - 5.1 g/dL    Protein Total 7.8  6.8 - 8.8 g/dL    Alkaline Phosphatase 66  40 - 150 U/L    ALT 36  0 - 50 U/L    AST 28  0 - 45 U/L   CK TOTAL       Component Value Range    CK Total 66  30 - 225 U/L   CRP INFLAMMATION       Component Value Range    CRP Inflammation 10.4 (*) 0.0 - 8.0 mg/L   LIPASE       Component Value Range    Lipase 353 (*) 20 - 250 U/L   ERYTHROCYTE SEDIMENTATION RATE AUTO       Component Value Range    Sed Rate 26 (*) 0 - 20 mm/h   ROUTINE UA WITH MICROSCOPIC REFLEX TO CULTURE       Component Value Range    Color Urine Yellow      Appearance Urine Slightly Cloudy      Glucose Urine 30 (*) NEG mg/dL    Bilirubin Urine Negative  NEG    Ketones Urine 5 (*) NEG mg/dL    Specific Gravity Urine 1.026  1.003 - 1.035    Blood Urine Trace (*) NEG    pH Urine 5.0  5.0 - 7.0 pH    Protein Albumin Urine 10 (*) NEG mg/dL    Urobilinogen mg/dL Normal  0.0 - 2.0 mg/dL    Nitrite Urine Negative  NEG    Leukocyte Esterase Urine Negative  NEG    Source Midstream Urine      WBC Urine 1  0 - 2 /HPF    RBC Urine 4 (*) 0 - 2 /HPF    Squamous Epithelial /HPF Urine <1  0 - 1 /HPF    Mucous Urine Present (*) NEG /LPF    Hyaline Casts 3 (*) 0 - 2 /LPF    Calcium Oxalate Moderate (*) NEG /HPF   COMPLEMENT C3       Component Value Range    Complement C3 143  76 - 169 mg/dL   COMPLEMENT C4       Component Value Range    Complement C4 31  15 - 50 mg/dL   HEPATITIS C ANTIBODY       Component Value Range    Hepatitis C Antibody Negative  NEG   CARDIOLIPIN ANTIBODY IGG AND IGM       Component Value Range    Cardiolipin IgG Marline    0 - 15.0 GPL    Value: <15.0      Interpretation:  Negative    Cardiolipin IgM Marline    0 - 12.5 MPL    Value: <12.5      Interpretation:  Negative   LUPUS PANEL       Component Value Range    Lupus Result     NEG    Value: Negative      (Note)      COMMENTS:      The INR is normal.      APTT is normal.  1:2 Mix is not indicated.      DRVVT Screen is normal.      Thrombin time is normal.      NEGATIVE TEST; A LUPUS ANTICOAGULANT WAS NOT DETECTED IN THIS      SPECIMEN WITHIN THE LIMITS OF THE TESTING REPERTOIRE.      If the clinical picture is strongly suggestive of an antiphospholipid      syndrome, recommend anticardiolipin and beta-2-glycoprotein (IgG and      IgM) antibody tests.      Leela Franks M.D.  [phone]      5/2/2013            INR =  0.93 N = 0.86-1.14  (No additional charge)      TT = 15.7 N = 13.0-19.0 sec  (No additional charge)            APTT'S:    Seconds      Reagent =  Stago LA      Normal  =  38      Patient  =  34      1:2 Mix  =  N/A      Reference =  31-43             DILUTE MADELINE VIPER VENOM TEST:      DRVVT Screen Ratio = 0.76 Normal = <1.21         IMMUNOGLOBULIN G SUBCLASSES       Component Value Range    IGG 1030  695 - 1620 mg/dL    IgG1 488  300 - 856 mg/dL    IgG2 325  158 - 761 mg/dL    IgG3 47  24 - 192 mg/dL    IgG4 18  11 - 86 mg/dL   SUNNY ANTIBODY PANEL       Component Value Range    Ribonucleic Protein IgG Antibody 0      Smith Antibody IgG 1      SSA (RO) Antibody IgG 4      SSB (LA) Antibody IgG 0      Scleroderma Antibody IgG 0     BETA 2 GLYCOPROTEIN ANTIBODIES IGG IGM       Component Value Range    Beta-2-Glycopro IgG 1      Beta-2-Glycopro IgM 5     CYCLIC CIT PEPT IGG       Component Value Range    Cyclic Cit Pept IgG/IgA    <20 UNITS    Value: <20      Interpretation:  Negative   DNA DOUBLE STRANDED ANTIBODIES       Component Value Range    DNA-ds    0 - 29 IU/mL    Value: <15      Interpretation:  Negative       Component      Latest Ref Rn 3/11/2014   Sodium      133 - 144 mmol/L 137   Potassium      3.4 - 5.3 mmol/L 4.6   Chloride      94 - 109 mmol/L 97   Carbon Dioxide      20 - 32 mmol/L 20   Anion Gap      6 - 17 mmol/L 20 (H)   Glucose      60 - 99  mg/dL 243 (H)   Urea Nitrogen      5 - 24 mg/dL 35 (H)   Creatinine      0.52 - 1.04 mg/dL 1.47 (H)   GFR Estimate      >60 mL/min/1.7m2 38 (L)   GFR Estimate If Black      >60 mL/min/1.7m2 46 (L)   Calcium      8.5 - 10.4 mg/dL 9.7   Bilirubin Total      0.2 - 1.3 mg/dL 0.3   Albumin      3.9 - 5.1 g/dL 4.8   Protein Total      6.8 - 8.8 g/dL 7.9   Alkaline Phosphatase      40 - 150 U/L 73   ALT      0 - 50 U/L 35   AST      0 - 45 U/L 30   Color Urine       Yellow   Appearance Urine       Clear   Glucose Urine      NEG mg/dL 500 (A)   Bilirubin Urine      NEG Negative   Ketones Urine      NEG mg/dL Negative   Specific Gravity Urine      1.003 - 1.035 1.020   Blood Urine      NEG Negative   pH Urine      5.0 - 7.0 pH 5.5   Protein Albumin Urine      NEG mg/dL Negative   Urobilinogen Urine      0.2 - 1.0 EU/dL 0.2   Nitrite Urine      NEG Negative   Leukocyte Esterase Urine      NEG Negative   Source       Midstream Urine   WBC      4.0 - 11.0 10e9/L 14.0 (H)   RBC Count      3.8 - 5.2 10e12/L 5.02   Hemoglobin      11.7 - 15.7 g/dL 12.4   Hematocrit      35.0 - 47.0 % 37.1   MCV      78 - 100 fl 74 (L)   MCH      26.5 - 33.0 pg 24.7 (L)   MCHC      31.5 - 36.5 g/dL 33.4   RDW      10.0 - 15.0 % 15.3 (H)   Platelet Count      150 - 450 10e9/L 420       Component      Latest Ref Rng 3/25/2014   Sodium      133 - 144 mmol/L 137   Potassium      3.4 - 5.3 mmol/L 3.7   Chloride      94 - 109 mmol/L 100   Carbon Dioxide      20 - 32 mmol/L 21   Anion Gap      6 - 17 mmol/L 16   Glucose      60 - 99 mg/dL 214 (H)   Urea Nitrogen      5 - 24 mg/dL 20   Creatinine      0.52 - 1.04 mg/dL 1.02   GFR Estimate      >60 mL/min/1.7m2 58 (L)   GFR Estimate If Black      >60 mL/min/1.7m2 70   Calcium      8.5 - 10.4 mg/dL 9.5   Phosphorus      2.5 - 4.5 mg/dL 3.7   Albumin      3.9 - 5.1 g/dL 4.6     Component      Latest Ref Rng 4/30/2014   CRP Inflammation      0.0 - 8.0 mg/L 10.0 (H)   Sed Rate      0 - 20 mm/h 39 (H)    Rheumatoid Factor      <20 IU/mL <20   Glucose-6-PO4 Dehydrogenase       17.7     Component      Latest Ref Rng 6/11/2014 7/15/2014   WBC      4.0 - 11.0 10e9/L 11.0    RBC Count      3.8 - 5.2 10e12/L 4.74    Hemoglobin      11.7 - 15.7 g/dL 11.8    Hematocrit      35.0 - 47.0 % 36.1    MCV      78 - 100 fl 76 (L)    MCH      26.5 - 33.0 pg 24.9 (L)    MCHC      31.5 - 36.5 g/dL 32.7    RDW      10.0 - 15.0 % 13.9    Platelet Count      150 - 450 10e9/L 434    Diff Method       Automated Method    % Neutrophils       59.2    % Lymphocytes       31.6    % Monocytes       5.9    % Eosinophils       2.6    % Basophils       0.5    % Immature Granulocytes       0.2    Absolute Neutrophil      1.6 - 8.3 10e9/L 6.5    Absolute Lymphocytes      0.8 - 5.3 10e9/L 3.5    Absolute Monoctyes      0.0 - 1.3 10e9/L 0.7    Absolute Eosinophils      0.0 - 0.7 10e9/L 0.3    Absolute Basophils      0.0 - 0.2 10e9/L 0.1    Abs Immature Granulocytes      0 - 0.4 10e9/L 0.0    Sodium      133 - 144 mmol/L 138 140   Potassium      3.4 - 5.3 mmol/L 3.9 3.8   Chloride      94 - 109 mmol/L 97 103   Carbon Dioxide      20 - 32 mmol/L 26 22   Anion Gap      6 - 17 mmol/L 14.9 15   Glucose      60 - 99 mg/dL 111 (H) 163 (H)   Urea Nitrogen      5 - 24 mg/dL 28 (H) 15   Creatinine      0.52 - 1.04 mg/dL 1.10 (H) 0.99   GFR Estimate      >60 mL/min/1.7m2 53 (L) 60 (L)   GFR Estimate If Black      >60 mL/min/1.7m2 64 72   Calcium      8.5 - 10.4 mg/dL 10.3 9.3   Bilirubin Total      0.2 - 1.3 mg/dL 0.6 0.2   Albumin      3.9 - 5.1 g/dL 5.1 4.2   Protein Total      6.8 - 8.8 g/dL 9.0 (H) 7.2   Alkaline Phosphatase      40 - 150 U/L 87 69   ALT      0 - 50 U/L 37 33   AST      0 - 45 U/L 40 26   Color Urine       Straw    Appearance Urine       Clear    Glucose Urine      NEG mg/dL Negative    Bilirubin Urine      NEG Negative    Ketones Urine      NEG mg/dL Negative    Specific Gravity Urine      1.003 - 1.035 1.005    Blood Urine      NEG  Negative    pH Urine      5.0 - 7.0 pH 5.0    Protein Albumin Urine      NEG mg/dL Negative    Urobilinogen mg/dL      0.0 - 2.0 mg/dL Normal    Nitrite Urine      NEG Negative    Leukocyte Esterase Urine      NEG Negative    Source       Midstream Urine    Lipase      20 - 250 U/L 403 (H)    CRP Inflammation      0.0 - 8.0 mg/L <5.0    N-Terminal Pro Bnp      0 - 125 pg/mL  55   Hemoglobin A1C      4.3 - 6.0 %  7.0 (H)     Component      Latest Ref Rng 11/12/2014   Sodium      133 - 144 mmol/L 135   Potassium      3.4 - 5.3 mmol/L 3.8   Chloride      94 - 109 mmol/L 104   Carbon Dioxide      20 - 32 mmol/L 24   Anion Gap      3 - 14 mmol/L 7   Glucose      70 - 99 mg/dL 125 (H)   Urea Nitrogen      7 - 30 mg/dL 17   Creatinine      0.52 - 1.04 mg/dL 1.18 (H)   GFR Estimate      >60 mL/min/1.7m2 49 (L)   GFR Estimate If Black      >60 mL/min/1.7m2 59 (L)   Calcium      8.5 - 10.1 mg/dL 9.8   WBC      4.0 - 11.0 10e9/L 11.1 (H)   RBC Count      3.8 - 5.2 10e12/L 4.05   Hemoglobin      11.7 - 15.7 g/dL 9.8 (L)   Hematocrit      35.0 - 47.0 % 30.6 (L)   MCV      78 - 100 fl 76 (L)   MCH      26.5 - 33.0 pg 24.2 (L)   MCHC      31.5 - 36.5 g/dL 32.0   RDW      10.0 - 15.0 % 15.5 (H)   Platelet Count      150 - 450 10e9/L 484 (H)   CT CHEST PULMONARY EMBOLISM W CONTRAST 5/20/2015 4:57 PM  HISTORY: Pain. SOB. Elevated d-dimer.   TECHNIQUE: 65 mL Isovue 370. Axial images with coronal  reconstructions.  COMPARISON: None.  FINDINGS: Calcified granuloma with surrounding scarring in the  posterolateral left upper lobe. Triangular shaped opacity at the right  lung base in the lateral right middle lobe could represent some  scarring, atelectasis or infiltrate. There is also some scarring or  atelectasis in the posteromedial right lung base. Lungs otherwise  clear.  The pulmonary arteries are well opacified. No CT evidence for acute  pulmonary embolus. No aortic dissection.  Diffuse fatty infiltration of the  liver.  IMPRESSION  IMPRESSION:   1. No pulmonary embolus identified.  2. Small focus of atelectasis, infiltrate or scarring in the lateral  right middle lobe.  3. Old granulomatous disease.  4. Otherwise negative.  SILVERIO VAZQUEZ MD  Component      Latest Ref Rng 3/27/2015   WBC      4.0 - 11.0 10e9/L 11.8 (H)   RBC Count      3.8 - 5.2 10e12/L 4.53   Hemoglobin      11.7 - 15.7 g/dL 11.0 (L)   Hematocrit      35.0 - 47.0 % 33.8 (L)   MCV      78 - 100 fl 75 (L)   MCH      26.5 - 33.0 pg 24.3 (L)   MCHC      31.5 - 36.5 g/dL 32.5   RDW      10.0 - 15.0 % 15.4 (H)   Platelet Count      150 - 450 10e9/L 419   Diff Method       Automated Method   % Neutrophils       75.3   % Lymphocytes       17.4   % Monocytes       4.4   % Eosinophils       2.5   % Basophils       0.2   % Immature Granulocytes       0.2   Absolute Neutrophil      1.6 - 8.3 10e9/L 8.9 (H)   Absolute Lymphocytes      0.8 - 5.3 10e9/L 2.1   Absolute Monoctyes      0.0 - 1.3 10e9/L 0.5   Absolute Eosinophils      0.0 - 0.7 10e9/L 0.3   Absolute Basophils      0.0 - 0.2 10e9/L 0.0   Abs Immature Granulocytes      0 - 0.4 10e9/L 0.0   Creatinine      0.52 - 1.04 mg/dL 1.12 (H)   GFR Estimate      >60 mL/min/1.7m2 52 (L)   GFR Estimate If Black      >60 mL/min/1.7m2 63   Iron      35 - 180 ug/dL 25 (L)   Iron Binding Cap      240 - 430 ug/dL 346   Iron Saturation Index      15 - 46 % 7 (L)   Albumin      3.4 - 5.0 g/dL 4.0   ALT      0 - 50 U/L 24   AST      0 - 45 U/L 15   CRP Inflammation      0.0 - 8.0 mg/L 28.0 (H)   Sed Rate      0 - 20 mm/h 81 (H)   Rheumatoid Factor      <20 IU/mL <20   Vitamin D Deficiency screening      30 - 75 ug/L 44   Ferritin      8 - 252 ng/mL 34     Component      Latest Ref Rng 7/29/2015   Sodium      133 - 144 mmol/L 137   Potassium      3.4 - 5.3 mmol/L 3.8   Chloride      94 - 109 mmol/L 106   Carbon Dioxide      20 - 32 mmol/L 23   Anion Gap      3 - 14 mmol/L 8   Glucose      70 - 99 mg/dL 148 (H)   Urea Nitrogen       7 - 30 mg/dL 17   Creatinine      0.52 - 1.04 mg/dL 1.02   GFR Estimate      >60 mL/min/1.7m2 58 (L)   GFR Estimate If Black      >60 mL/min/1.7m2 70   Calcium      8.5 - 10.1 mg/dL 9.0   Bilirubin Total      0.2 - 1.3 mg/dL 0.5   Albumin      3.4 - 5.0 g/dL 4.2   Protein Total      6.8 - 8.8 g/dL 7.4   Alkaline Phosphatase      40 - 150 U/L 64   ALT      0 - 50 U/L 23   AST      0 - 45 U/L 19     Results for JUAN FAVIO CHELLE (MRN [id number]) as of 10/30/2015 17:00   Ref. Range 8/21/2012 09:06 5/22/2013 14:22 4/14/2014 12:06 9/11/2014 12:35 12/4/2014 16:38   TSH Latest Range: 0.40-4.00 mU/L 0.83 0.43 0.27 (L) 0.23 (L) 0.22 (L)     Component      Latest Ref Rng 10/30/2015   WBC      4.0 - 11.0 10e9/L 13.4 (H)   RBC Count      3.8 - 5.2 10e12/L 4.76   Hemoglobin      11.7 - 15.7 g/dL 12.4   Hematocrit      35.0 - 47.0 % 37.9   MCV      78 - 100 fl 80   MCH      26.5 - 33.0 pg 26.1 (L)   MCHC      31.5 - 36.5 g/dL 32.7   RDW      10.0 - 15.0 % 14.5   Platelet Count      150 - 450 10e9/L 324   Diff Method       Automated Method   % Neutrophils       67.7   % Lymphocytes       22.7   % Monocytes       6.3   % Eosinophils       2.7   % Basophils       0.4   % Immature Granulocytes       0.2   Absolute Neutrophil      1.6 - 8.3 10e9/L 9.1 (H)   Absolute Lymphocytes      0.8 - 5.3 10e9/L 3.1   Absolute Monoctyes      0.0 - 1.3 10e9/L 0.9   Absolute Eosinophils      0.0 - 0.7 10e9/L 0.4   Absolute Basophils      0.0 - 0.2 10e9/L 0.1   Abs Immature Granulocytes      0 - 0.4 10e9/L 0.0   Creatinine      0.52 - 1.04 mg/dL 1.21 (H)   GFR Estimate      >60 mL/min/1.7m2 47 (L)   GFR Estimate If Black      >60 mL/min/1.7m2 57 (L)   Iron      35 - 180 ug/dL 70   Iron Binding Cap      240 - 430 ug/dL 428   Iron Saturation Index      15 - 46 % 16   Albumin      3.4 - 5.0 g/dL 4.6   ALT      0 - 50 U/L 29   AST      0 - 45 U/L 21   CRP Inflammation      0.0 - 8.0 mg/L <2.9   Sed Rate      0 - 20 mm/h 8   Vitamin D Deficiency screening       20 - 75 ug/L 46   Ferritin      8 - 252 ng/mL 18   TSH      0.40 - 4.00 mU/L 0.49   T4 Free      0.76 - 1.46 ng/dL 1.06   Free T3      2.3 - 4.2 pg/mL 2.8     Component      Latest Ref Rng 11/17/2015   Testosterone Total      8 - 60 ng/dL 19   Sex Hormone Binding Globulin      30 - 135 nmol/L 32   Free Testosterone Calculated      0.11 - 0.58 ng/dL 0.34   Vitamin A       0.61   Retinol Palmitate       <0.02 . . .   Vitamin A Interp       Normal . . .   Thyroglobulin Antibody      <40 IU/mL <20   Thyroid Peroxidase Antibody      <35 IU/mL 31   DHEA Sulfate      35 - 430 ug/dL 101   Zinc       68     Component      Latest Ref Rng 1/27/2016   Sodium      133 - 144 mmol/L 135   Potassium      3.4 - 5.3 mmol/L 4.0   Chloride      94 - 109 mmol/L 104   Carbon Dioxide      20 - 32 mmol/L 24   Anion Gap      3 - 14 mmol/L 7   Glucose      70 - 99 mg/dL 88   Urea Nitrogen      7 - 30 mg/dL 20   Creatinine      0.52 - 1.04 mg/dL 1.13 (H)   GFR Estimate      >60 mL/min/1.7m2 51 (L)   GFR Estimate If Black      >60 mL/min/1.7m2 62   Calcium      8.5 - 10.1 mg/dL 9.4   Bilirubin Total      0.2 - 1.3 mg/dL 0.7   Albumin      3.4 - 5.0 g/dL 4.3   Protein Total      6.8 - 8.8 g/dL 7.7   Alkaline Phosphatase      40 - 150 U/L 66   ALT      0 - 50 U/L 24   AST      0 - 45 U/L 18   Cholesterol      <200 mg/dL 112   Triglycerides      <150 mg/dL 100   HDL Cholesterol      >49 mg/dL 34 (L)   LDL Cholesterol Calculated      <100 mg/dL 58   Non HDL Cholesterol      <130 mg/dL 78   N-Terminal Pro Bnp      0 - 125 pg/mL 29   Hemoglobin A1C      4.3 - 6.0 % 7.0 (H)   Amylase      30 - 110 U/L 60   Lipase      73 - 393 U/L 394 (H)   Troponin I ES      0.000 - 0.045 ug/L <0.015 . . .     Component      Latest Ref Rng 2/24/2016   Angiotensin Converting Enzyme       <5 (L) . . .   Neutrophil Cytoplasmic IgG Antibody       <1:20 . . .   Sed Rate      0 - 20 mm/h 86 (H)     Copath Report      Patient Name: FAVIO MARTINEZ   MR#: [id number]  "  Specimen #: [id number]   Collected: 3/15/2016   Received: 3/15/2016   Reported: 3/17/2016 13:33   Ordering Phy(s): PARVEEN ENRIQUEZ     ORIGINAL REPORT FOLLOWS ADDENDUM  ADDENDUM     TO ORIGINAL REPORT   Status: Signed Out   Date Ordered:3/18/2016   Date Complete:3/18/2016   Date Reported:3/18/2016 12:06   Signed Out By: Marianne Godfrey MD     INTERPRETATION:   This addendum is done to incorporate the results of fungal (GMS) stains   done on the specimen.  Specimen is negative for fungal organisms.  The   original final diagnosis remains unchanged.     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  Negative for malignancy(Please   see microscopic description)     Electronically signed out by:     Marianne Godfrey MD     CLINICAL HISTORY:   right orbital mass     GROSS:   The specimen is received labeled \"right orbital biopsy\" and consists of   red-tan nodule measuring 1.5 x 0.9 x 0.6 cm with one smooth side and   opposite rough side consistent with periosteum.  The specimen is   bisected revealing homogenous pale tan fleshy cut surface.  Touch   preparations are prepared, air dried and fixed, portion of specimen is   submitted in RPMI for possible lymphoma workup [facility],   ([facility], [place], [place] ).  The remainder is entirely submitted.   (Dictated by: Marianne Godfrey MD 3/15/2016 04:45 PM)     MICROSCOPIC:     Specimen consists of portion of lacrimal gland with acute and chronic   inflammation.  Focal area of microabscess formation associated with   small areas of necrosis are also present.  Inflammation is seen   extending to the periorbital adipose tissue forming panniculitis.   Specimen is negative for malignancy.  Samples sent for immunophenotyping   to [facility], " ([facility], [place], [place] ) reveals no evidence   of monoclonality or aberrant antigen expression.  A GMS (fungal) stain   is pending and results will be reported as an addendum.     CPT Codes:   A: 66916-TD6, 69869-NDRRF, SOH, 31061-BXXCI, 90512-HQOO     TESTING LAB LOCATION:   [address]  [address] [phone]     COLLECTION SITE:   Client: [facility]   Location: [facility] (S)            Component      Latest Ref Rng 4/29/2016   Nucleated RBCs      0 /100 0   Absolute Neutrophil      1.6 - 8.3 10e9/L 8.9 (H)   Absolute Lymphocytes      0.8 - 5.3 10e9/L 3.2   Absolute Monocytes      0.0 - 1.3 10e9/L 0.8   Absolute Eosinophils      0.0 - 0.7 10e9/L 0.2   Absolute Basophils      0.0 - 0.2 10e9/L 0.1   Abs Immature Granulocytes      0 - 0.4 10e9/L 0.1   Absolute Nucleated RBC       0.0   IGG      695 - 1620 mg/dL 836   IgG1      300 - 856 mg/dL 280 (L)   IgG2      158 - 761 mg/dL 277   IgG3      24 - 192 mg/dL 35   IgG4      11 - 86 mg/dL 16   RNP Antibody IgG      0.0 - 0.9 AI <0.2 . . .   Smith SUNNY Antibody IgG      0.0 - 0.9 AI <0.2 . . .   SSA (Ro) (SUNNY) Antibody, IgG      0.0 - 0.9 AI <0.2 . . .   SSB (La) (SUNNY) Antibody, IgG      0.0 - 0.9 AI <0.2 . . .   Scleroderma Antibody Scl-70 SUNNY IgG      0.0 - 0.9 AI <0.2 . . .   Cholesterol      <200 mg/dL 154   Triglycerides      <150 mg/dL 220 (H)   HDL Cholesterol      >49 mg/dL 42 (L)   LDL Cholesterol Calculated      <100 mg/dL 67   Non HDL Cholesterol      <130 mg/dL 111   M Tuberculosis Result      NEG Negative   M Tuberculosis Antigen Value       0.00   Albumin      3.4 - 5.0 g/dL 4.3   ALT      0 - 50 U/L 30   AST      0 - 45 U/L 10   Complement C3      76 - 169 mg/dL 157   Complement C4      15 - 50 mg/dL 32   CRP Inflammation      0.0 - 8.0 mg/L <2.9   Sed Rate      0 - 20 mm/h 2   DNA-ds      <10 IU/mL 1   Cyclic Citrullinated Peptide Antibody, IgG      <7 U/mL 1   Rheumatoid  Factor      <20 IU/mL <20   Proteinase 3 Antibody IgG      0.0 - 0.9 AI <0.2 . . .   Myeloperoxidase Antibody IgG      0.0 - 0.9 AI 2.5 (H)   Vitamin D Deficiency screening      20 - 75 ug/L 24   Hemoglobin A1C      4.3 - 6.0 % 7.9 (H)   ALLI by IFA IgG       1:40 (A) . . .     Component      Latest Ref Rng & Units 4/7/2017   WBC      4.0 - 11.0 10e9/L 11.3 (H)   RBC Count      3.8 - 5.2 10e12/L 4.77   Hemoglobin      11.7 - 15.7 g/dL 12.5   Hematocrit      35.0 - 47.0 % 38.7   MCV      78 - 100 fl 81   MCH      26.5 - 33.0 pg 26.2 (L)   MCHC      31.5 - 36.5 g/dL 32.3   RDW      10.0 - 15.0 % 13.2   Platelet Count      150 - 450 10e9/L 347   Diff Method       Automated Method   % Neutrophils      % 67.1   % Lymphocytes      % 22.9   % Monocytes      % 6.2   % Eosinophils      % 3.0   % Basophils      % 0.4   % Immature Granulocytes      % 0.4   Nucleated RBCs      0 /100 0   Absolute Neutrophil      1.6 - 8.3 10e9/L 7.5   Absolute Lymphocytes      0.8 - 5.3 10e9/L 2.6   Absolute Monocytes      0.0 - 1.3 10e9/L 0.7   Absolute Eosinophils      0.0 - 0.7 10e9/L 0.3   Absolute Basophils      0.0 - 0.2 10e9/L 0.1   Abs Immature Granulocytes      0 - 0.4 10e9/L 0.1   Absolute Nucleated RBC       0.0   IGG      695 - 1620 mg/dL 962   IgG1      300 - 856 mg/dL Test sent to [facility]. See ARMISC.   IgG2      158 - 761 mg/dL Test sent to [facility]. See ARMISC.   IgG3      24 - 192 mg/dL Test sent to [facility]. See ARMISC.   IgG4      11 - 86 mg/dL Test sent to [facility]. See ARMISC.   Result       SEE NOTE . . .   Test Name       IGG SUBCLASSES   Send Outs Misc Test Code       43905   Send Outs Misc Test Specimen       Serum   Creatinine      0.52 - 1.04 mg/dL 1.20 (H)   GFR Estimate      >60 mL/min/1.7m2 47 (L)   GFR Estimate If Black      >60 mL/min/1.7m2 57 (L)   Creatinine Urine      mg/dL    Albumin Urine mg/L      mg/L    Albumin Urine mg/g Cr      0 - 25 mg/g Cr    Myeloperoxidase Antibody IgG      0.0 - 0.9 AI 2.9 (H)   Proteinase 3  Antibody IgG      0.0 - 0.9 AI <0.2 . . .   Neutrophil Cytoplasmic IgG Antibody       1:80 (A) . . .   Angiotensin Converting Enzyme       <5 (L) . . .   Lab Scanned Result       TPMT GENOTYPE-Scanned   Vitamin C       56   ALT      0 - 50 U/L 23   Albumin      3.4 - 5.0 g/dL 4.3   AST      0 - 45 U/L 18   CRP Inflammation      0.0 - 8.0 mg/L 3.7   Sed Rate      0 - 30 mm/h 17   Vitamin D Deficiency screening      20 - 75 ug/L 40   Hemoglobin A1C      4.3 - 6.0 %      Component      Latest Ref Rng & Units 4/26/2017   WBC      4.0 - 11.0 10e9/L 11.8 (H)   RBC Count      3.8 - 5.2 10e12/L 4.23   Hemoglobin      11.7 - 15.7 g/dL 11.2 (L)   Hematocrit      35.0 - 47.0 % 35.0   MCV      78 - 100 fl 83   MCH      26.5 - 33.0 pg 26.5   MCHC      31.5 - 36.5 g/dL 32.0   RDW      10.0 - 15.0 % 13.4   Platelet Count      150 - 450 10e9/L 304   Diff Method       Automated Method   % Neutrophils      % 66.2   % Lymphocytes      % 22.7   % Monocytes      % 6.5   % Eosinophils      % 3.9   % Basophils      % 0.4   % Immature Granulocytes      % 0.3   Nucleated RBCs      0 /100 0   Absolute Neutrophil      1.6 - 8.3 10e9/L 7.8   Absolute Lymphocytes      0.8 - 5.3 10e9/L 2.7   Absolute Monocytes      0.0 - 1.3 10e9/L 0.8   Absolute Eosinophils      0.0 - 0.7 10e9/L 0.5   Absolute Basophils      0.0 - 0.2 10e9/L 0.1   Abs Immature Granulocytes      0 - 0.4 10e9/L 0.0   Absolute Nucleated RBC       0.0   IGG      695 - 1620 mg/dL    IgG1      300 - 856 mg/dL    IgG2      158 - 761 mg/dL    IgG3      24 - 192 mg/dL    IgG4      11 - 86 mg/dL    Result          Test Name          Send Outs Misc Test Code          Send Outs Misc Test Specimen          Creatinine      0.52 - 1.04 mg/dL 1.24 (H)   GFR Estimate      >60 mL/min/1.7m2 46 (L)   GFR Estimate If Black      >60 mL/min/1.7m2 55 (L)   Creatinine Urine      mg/dL 121   Albumin Urine mg/L      mg/L <5   Albumin Urine mg/g Cr      0 - 25 mg/g Cr Unable to calculate due to low value    Myeloperoxidase Antibody IgG      0.0 - 0.9 AI    Proteinase 3 Antibody IgG      0.0 - 0.9 AI    Neutrophil Cytoplasmic IgG Antibody          Angiotensin Converting Enzyme          Lab Scanned Result          Vitamin C          ALT      0 - 50 U/L 25   Albumin      3.4 - 5.0 g/dL 4.1   AST      0 - 45 U/L 15   CRP Inflammation      0.0 - 8.0 mg/L    Sed Rate      0 - 30 mm/h    Vitamin D Deficiency screening      20 - 75 ug/L    Hemoglobin A1C      4.3 - 6.0 % 7.8 (H)   EXAMINATION: CT CHEST ABDOMEN PELVIS W/O CONTRAST, 4/7/2017 2:59 PM     TECHNIQUE: Helical CT images from the thoracic inlet through the  symphysis pubis were obtained without IV contrast.     COMPARISON: CT chest on 5/20/2015. CT abdomen pelvis on 6/11/2014     HISTORY: Granuloma of right orbit. Concern for malignancy, lymphoma.     Additional history from the EMR: 50-year-old female with rheumatoid  arthritis and fibromyalgia. Presented on April 2016 with new right  orbital inflammatory mass, biopsied showed panniculitis without  infection or malignancy. Some intermittent swelling around her right  orbit.     FINDINGS:     Chest: Cardiac size is not enlarged. No pericardial effusion.  Calcified subcentimeter mediastinal and left hilar lymph nodes. No  thoracic adenopathy. Coronary artery calcifications/stents.  Benign-appearing coarse calcifications in the thyroid, better  described on ultrasound thyroid from 9/13/2016.     Central airways are patent. No pneumothorax or pleural effusion.  Calcified pulmonary granuloma in the posterior left upper lobe,  benign. Small focus of subpleural fibrosis and cysts along the  anterior lateral right lower lobe (image 96 series 6).     Abdomen and pelvis: Cholecystectomy. The liver, adrenal glands,  kidneys, spleen, pancreas, stomach, duodenum are without focal  abnormality on these noncontrast images.     No abdominal aortic aneurysm. No suspicious adenopathy in the abdomen  or pelvis. Bladder is intact.  Calcified fibroid off the uterine  fundus. IUD in the uterus.     No focal bowel wall thickening or obstruction. No free air or free  fluid. Appendix is normal. Small periumbilical hernia.     Bones and soft tissues: No suspicious osseous lesions. Unremarkable  superficial soft tissues.         IMPRESSION: No evidence of malignancy in the chest, abdomen, or  pelvis.        I have personally reviewed the examination and initial interpretation  and I agree with the findings.     CONNIE BRICE      Component      Latest Ref Rng & Units 6/1/2017   WBC      4.0 - 11.0 10e9/L 12.0 (H)   RBC Count      3.8 - 5.2 10e12/L 5.18   Hemoglobin      11.7 - 15.7 g/dL 13.8   Hematocrit      35.0 - 47.0 % 41.0   MCV      78 - 100 fl 79   MCH      26.5 - 33.0 pg 26.6   MCHC      31.5 - 36.5 g/dL 33.7   RDW      10.0 - 15.0 % 14.8   Platelet Count      150 - 450 10e9/L 322   Diff Method       Automated Method   % Neutrophils      % 76.7   % Lymphocytes      % 16.5   % Monocytes      % 4.6   % Eosinophils      % 1.9   % Basophils      % 0.3   Absolute Neutrophil      1.6 - 8.3 10e9/L 9.2 (H)   Absolute Lymphocytes      0.8 - 5.3 10e9/L 2.0   Absolute Monocytes      0.0 - 1.3 10e9/L 0.6   Absolute Eosinophils      0.0 - 0.7 10e9/L 0.2   Absolute Basophils      0.0 - 0.2 10e9/L 0.0   Color Urine       Yellow   Appearance Urine       Clear   Glucose Urine      NEG mg/dL Negative   Bilirubin Urine      NEG Negative   Ketones Urine      NEG mg/dL Negative   Specific Gravity Urine      1.003 - 1.035 1.010   pH Urine      5.0 - 7.0 pH 5.5   Protein Albumin Urine      NEG mg/dL Negative   Urobilinogen Urine      0.2 - 1.0 EU/dL 0.2   Nitrite Urine      NEG Negative   Blood Urine      NEG Negative   Leukocyte Esterase Urine      NEG Negative   Source       Midstream Urine   WBC Urine      0 - 2 /HPF O - 2   RBC Urine      0 - 2 /HPF O - 2   Squamous Epithelial /LPF Urine      FEW /LPF Few   Bacteria Urine      NEG /HPF Few (A)   Creatinine       0.52 - 1.04 mg/dL 1.19 (H)   GFR Estimate      >60 mL/min/1.7m2 48 (L)   GFR Estimate If Black      >60 mL/min/1.7m2 58 (L)   Protein Random Urine      g/L <0.05   Protein Total Urine g/gr Creatinine      0 - 0.2 g/g Cr Unable to calculate due to low value   Myeloperoxidase Antibody IgG      0.0 - 0.9 AI 1.9 (H)   Proteinase 3 Antibody IgG      0.0 - 0.9 AI <0.2 . . .   ALT      0 - 50 U/L 29   AST      0 - 45 U/L 18   Albumin      3.4 - 5.0 g/dL 4.6   CRP Inflammation      0.0 - 8.0 mg/L 6.1   Sed Rate      0 - 30 mm/h 13   Creatinine Urine Random      mg/dL 54   Neutrophil Cytoplasmic IgG Antibody       <1:20 . . .   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      Comparison: MRI brain 5/29/2013      Technique:   Orbits: Axial and coronal T1-weighted, and coronal T2-weighted images  obtained without intravenous contrast. Post-intravenous contrast  (using gadolinium) sagittal FLAIR, were obtained with fat-saturation,  focused on the orbits.  Brain: Axial susceptibility-weighted and FLAIR sequences were obtained  of the whole brain without intravenous contrast, and postcontrast  axial T1-weighted images were obtained through the whole brain.   Contrast: 7.5mL Gadavist     Findings:  New asymmetric enlargement of the right lacrimal gland and enhancement  of the right lacrimal gland with surrounding ill-defined crescentic T2  hyperintense signal and enhancement within the right superior  superotemporal orbit, predominantly involving the extraconal space  with slight intraconal extension. No definite associated restricted  diffusion. No discrete extraocular muscle enlargement. Normal  symmetric optic nerve signal. Cavernous sinuses and orbital apices are  normal.  Globes are normal.     Whole brain images are symmetric minimal leukoaraiosis and generalized  parenchymal volume loss. Ventricles are not enlarged. No intracranial  hemorrhage, mass effect, midline shift or abnormal extra axial fluid  collection. No abnormal foci of intracranial enhancement or restricted  diffusion. Paranasal sinuses and mastoid air cells are clear.            Impression:    New abnormal enlargement of the right lacrimal gland with surrounding  ill-defined T2 hyperintense signal and enhancement centered in the  superotemporal right orbital fat, which may represent   infectious/inflammatory dacryadenitis, orbital pseudotumor, lymphoma,  carcinoma, sarcoidosis or IgG4 disease..     I have personally reviewed the examination and initial interpretation  and I agree with the findings.     PATRICIA BRANTLEY MD      Component      Latest Ref Rng & Units 6/1/2017   WBC      4.0 - 11.0 10e9/L 12.0 (H)   RBC Count      3.8 - 5.2 10e12/L 5.18   Hemoglobin      11.7 - 15.7 g/dL 13.8   Hematocrit      35.0 - 47.0 % 41.0   MCV      78 - 100 fl 79   MCH      26.5 - 33.0 pg 26.6   MCHC      31.5 - 36.5 g/dL 33.7   RDW      10.0 - 15.0 % 14.8   Platelet Count      150 - 450 10e9/L 322   Diff Method       Automated Method   % Neutrophils      % 76.7   % Lymphocytes      % 16.5   % Monocytes      % 4.6   % Eosinophils      % 1.9   % Basophils      % 0.3   Absolute Neutrophil      1.6 - 8.3 10e9/L 9.2 (H)   Absolute Lymphocytes      0.8 - 5.3 10e9/L 2.0   Absolute Monocytes      0.0 - 1.3 10e9/L 0.6   Absolute Eosinophils      0.0 - 0.7 10e9/L 0.2   Absolute Basophils      0.0 - 0.2 10e9/L 0.0   Color Urine       Yellow   Appearance Urine       Clear   Glucose Urine      NEG mg/dL Negative   Bilirubin Urine      NEG Negative   Ketones Urine      NEG mg/dL Negative   Specific Gravity Urine      1.003 - 1.035 1.010   pH Urine      5.0 - 7.0 pH 5.5   Protein Albumin Urine      NEG mg/dL Negative   Urobilinogen Urine       "0.2 - 1.0 EU/dL 0.2   Nitrite Urine      NEG Negative   Blood Urine      NEG Negative   Leukocyte Esterase Urine      NEG Negative   Source       Midstream Urine   WBC Urine      0 - 2 /HPF O - 2   RBC Urine      0 - 2 /HPF O - 2   Squamous Epithelial /LPF Urine      FEW /LPF Few   Bacteria Urine      NEG /HPF Few (A)   Creatinine      0.52 - 1.04 mg/dL 1.19 (H)   GFR Estimate      >60 mL/min/1.7m2 48 (L)   GFR Estimate If Black      >60 mL/min/1.7m2 58 (L)   Protein Random Urine      g/L <0.05   Protein Total Urine g/gr Creatinine      0 - 0.2 g/g Cr Unable to calculate due to low value   Myeloperoxidase Antibody IgG      0.0 - 0.9 AI 1.9 (H)   Proteinase 3 Antibody IgG      0.0 - 0.9 AI <0.2 . . .   ALT      0 - 50 U/L 29   AST      0 - 45 U/L 18   Albumin      3.4 - 5.0 g/dL 4.6   CRP Inflammation      0.0 - 8.0 mg/L 6.1   Sed Rate      0 - 30 mm/h 13   Creatinine Urine Random      mg/dL 54   Neutrophil Cytoplasmic IgG Antibody       <1:20 . . .   Creatinine Urine      mg/dL 54       Patient Name: FAVIO MARTINEZ   MR#: [id number]   Specimen #: [id number]   Collected: 8/4/2017   Received: 8/4/2017   Reported: 8/9/2017 16:19   Ordering Phy(s): CRISTHIAN FLORES     For improved result formatting, select 'View Enhanced Report Format'   under Linked Documents section.     SPECIMEN(S):   Eye, right lacrimal gland     FINAL DIAGNOSIS:   Eye, right, \"lacrimal gland\", biopsy   - Dense fibrosis and patchy inflammation   - No residual lacrimal gland seen     COMMENT:   Sections show tissue involved by dense fibrosis and patchy inflammation.   There is no morphologic or immunohistochemical evidence of lymphoma. By   separate report, concurrent flow cytometry studies ([id number]),   performed at the [facility], show polytypic B cells and no   aberrant immunophenotype on T cells. Immunohistochemical stains for IgG   and IgG-4 were performed, which provide no support for IgG-4-related   disease. Some of these changes " may be related to prior surgery. Sjögren   disease is not excluded. There is no evidence of malignancy. Dr. Max Conway has reviewed this case and concurs.     Electronically signed out by:     Antonella Beth M.D.   INDICATION:  Right eye edema. Reported history of right orbital biopsy yielding pathology consistent with idiopathic inflammation.    TECHNIQUE:  Noncontrast CT images were obtained through the brain and facial bones.    COMPARISON:  CT sinus 03/27/2017, MRI brain and orbits 02/15/2016.     FINDINGS:  CT facial bones:   Large soft tissue lesion centered within the lateral intraconal and extraconal right orbit has significantly increased in size compared to the CT dated 03/27/2017. The lesion is inseparable from the right superior, lateral, and inferior rectus muscles, as well as the right lacrimal gland. The lesion demonstrates extension posteriorly slightly proximal to the orbital apex, as well as extension into the medial orbit superiorly and anteriorly. Mass effect includes medial bowing of the optic nerve sheath complex and pronounced proptosis of the right globe.  No mass is identified in the right orbit.  Torus palatinus.   Minimal mucosal thickening in the ethmoid air cells. The remainder of the visualized paranasal sinuses are clear.  CT brain:  The ventricles and sulci within normal limits for patient age. No mass effect or midline shift. The gray-white differentiation is maintained.  No acute intracranial hemorrhage or pathologic extra-axial fluid collection.  The calvarium is intact. The mastoid air cells are clear.    IMPRESSION:  1. Large soft tissue lesion centered within the lateral intraconal and extraconal right orbit has significantly increased in size compared to the CT dated 03/27/2017. The lesion is inseparable from the right lacrimal gland and rectus musculature. Mass effect includes medial bowing of the optic nerve sheath complex and pronounced proptosis of the right globe.  Given provided history, findings may represent a progressive inflammatory etiology (pseudotumor). Other differential considerations, such as sarcoidosis or lymphoma, could also have this appearance.  2. No acute intracranial hemorrhage or intracranial mass effect.    Please note that all CT scans at this facility use dose modulation, iterative reconstruction, and/or weight-based dosing when appropriate to reduce radiation dose to as low as reasonably achievable.    Dictated by Charles Ward MD @ Jul 16 2018  3:54PM    Signed by Dr. Charles Ward @ Jul 16 2018  4:20PM    ROS:  A comprehensive ROS was done, positives are per HPI.        HISTORY REVIEW:  Past Medical History:   Diagnosis Date     Abnormal glandular Papanicolaou smear of cervix 1992     Allergic rhinitis     Allergy, airborne subst     Arthritis      ASCVD (arteriosclerotic cardiovascular disease)      Chronic pain      Chronic pancreatitis (H)     idiopathic, Tx: PPI, antioxidants, pancreatic enzymes     Common migraine      Coronary artery disease      Costochondritis      Costochondritis      Difficulty in walking(719.7)      Dyspnea on exertion      Ectasia, mammary duct     [facility], persistent nipple discharge     Elevated fasting glucose      Gastro-oesophageal reflux disease      Hernia      History of angina      Hyperlipidemia LDL goal < 100      Hypertension goal BP (blood pressure) < 140/90     Essential hypertension     Iron deficiency anemia      Ischemic cardiomyopathy      Menorrhagia      Migraine headaches      Mild persistent asthma      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 Matias, neg w/u, Rx'd w/prednisone     Ventral hernia, unspecified, without mention of obstruction or gangrene 2012     Past Surgical History:   Procedure Laterality Date     C ECHO HEART XTHORACIC,COMPLETE, W/O DOPPLER  04    [facility] WNL, EF 60%     C/SECTION, LOW TRANSVERSE           CARDIAC SURGERY      cardiac stent- recent in 16; 4 other stents     DILATION AND CURETTAGE N/A 2016    Procedure: DILATION AND CURETTAGE;  Surgeon: Nahed Butler MD;  Location: [facility]     HC UGI ENDOSCOPY W EUS  08    Dr. Pastrana, possible chronic pancreatitis, fatty liver     [place] [facility] [place] INTRAUTERINE DEVICE N/A 2016    Procedure: INSERT INTRAUTERINE DEVICE;  Surgeon: Nahed Butler MD;  Location: [facility]     INT UTERINE FIBRIOD EMBOLIZATION  10/29/2014     LAPAROSCOPIC CHOLECYSTECTOMY  08    Dr. Arnol GRUBBS TX, CERVICAL      s/p LEEP     ORBITOTOMY Right 3/15/2016    Procedure: ORBITOTOMY;  Surgeon: Myron Cyr MD;  Location: [facility]     ORBITOTOMY Right 2017    Procedure: ORBITOTOMY;  RIGHT ORBITOTOMY AND BIOPSY;  Surgeon: Charis Holbrook MD;  Location: [facility]     REPAIR PTOSIS Right 2017    Procedure: REPAIR PTOSIS;  RIGHT UPPER LID PTOSIS REPAIR;  Surgeon: Myron Cyr MD;  Location: [facility]     UPPER GI ENDOSCOPY  10/21/08    mild gastritis, Dr. Hidalgo     Family History   Problem Relation Age of Onset     HEART DISEASE Father 50     heart attack     Cerebrovascular Disease Father      Diabetes Father      Hypertension Father      Depression Father      C.A.D. Father      Hypertension Mother       Arthritis Mother      HEART DISEASE Mother      long qt syndrome     Thyroid Disease Mother      C.A.D. Mother      HEART DISEASE Brother 15     MI at 15, 16.      Diabetes Maternal Uncle      Hypertension Maternal Aunt      Hypertension Maternal Uncle      Arthritis Brother      he passed away, had severe arthritis at age 11     Arthritis Maternal Uncle      Eye Disorder Maternal Uncle      cataracts     Neurologic Disorder Sister      migraines     Neurologic Disorder Sister      migraines     Respiratory Son      asthma     Cerebrovascular Disease Maternal Uncle      C.A.D. Brother      Family History Negative Other      neg for RA, SLE     Unknown/Adopted No family hx of      unknown neurological issues in her family, mother was adopted     Skin Cancer No family hx of      No known family hx of skin cancer     Social History     Social History     Marital status: Single     Spouse name: N/A     Number of children: 1     Years of education: N/A     Occupational History      None      Social History Main Topics     Smoking status: Former Smoker     Packs/day: 0.20     Years: 1.00     Types: Cigarettes     Quit date: 2016     Smokeless tobacco: Never Used     Alcohol use No     Drug use: No     Sexual activity: Not Currently     Other Topics Concern     Parent/Sibling W/ Cabg, Mi Or Angioplasty Before 65f 55m? Yes     Social History Narrative    Balanced Diet - sometimes    Osteoporosis Prevention Measures - Dairy servings per day: 2 servings weekly    Regular Exercise -  Yes Describe walking 4 times a week    Dental Exam - NO    Seatbelts used - Yes    Self Breast Exam - Yes    Abuse: Current or Past (Physical, Sexual or Emotional)- No    Do you have any concerns about STD's -  No    Do you feel safe in your environment - No    Guns stored in the home - No    Sunscreen used - Yes    Lipids -  YES - Date:     Glucose -  YES - Date:     Eye Exam - YES - Date: one year ago    Colon Cancer  Screening - No    Hemoccults - NO    Pap Test -  YES - Date: , remote history of LEEP    Mammography - YES - Date: last spring, would like to discuss, needs a referral to [facility]    DEXA - NO    Immunizations reviewed and up to date - Yes, last td given in  or      Patient Active Problem List   Diagnosis     Seasonal affective disorder (H)     Allergic rhinitis     PCOS (polycystic ovarian syndrome)     Moderate persistent asthma     Chronic pancreatitis (H)     Hypertension goal BP (blood pressure) < 140/90     Common migraine     NSTEMI (non-ST elevated myocardial infarction) (H)     Hyperlipidemia LDL goal <70     ASCVD (arteriosclerotic cardiovascular disease)     GERD (gastroesophageal reflux disease)     Ischemic cardiomyopathy     Hypertensive heart disease     Uterine leiomyoma     Iron deficiency anemia     Costochondritis     Vitamin D deficiency     Breast cancer screening     Spinal stenosis in cervical region     Fibromyalgia     Seronegative rheumatoid arthritis (H)     Type 2 diabetes, HbA1C goal < 8% (H)     Type 2 diabetes mellitus with other specified complication (H)     Hyperlipidemia associated with type 2 diabetes mellitus (H)     Hypertension associated with diabetes (H)     Overweight with body mass index (BMI) 25.0-29.9     Tobacco use disorder     Telogen effluvium     CAD S/P percutaneous coronary angioplasty     Status post coronary angiogram       Pregnancy Hx: She is . All misscarriages were in first trimester. H/o OC use in the past. 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    PMHx, FHx, SHx were reviewed, unchanged.      Outpatient Encounter Prescriptions as of 2018   Medication Sig Dispense Refill     acetaminophen (TYLENOL) 325 MG tablet Take 1-2 tablets (325-650 mg) by mouth every 6 hours as needed for pain (headache) 250 tablet 0     albuterol (2.5 MG/3ML) 0.083% neb solution INL 1 VIAL VIA NEBULIZATION Q 4 TO 6 HOURS PRN   1     albuterol (PROAIR HFA, PROVENTIL HFA, VENTOLIN HFA) 108 (90 BASE) MCG/ACT inhaler Inhale 2 puffs into the lungs every 6 hours as needed for shortness of breath / dyspnea or wheezing 3 Inhaler 1     ASPIRIN LOW DOSE 81 MG EC tablet Take 1 tablet (81 mg) by mouth daily 90 tablet 3     calcium carbonate (TUMS) 500 MG chewable tablet Take 3-4 chew tab by mouth daily as needed.       clopidogrel (PLAVIX) 75 MG tablet Take 1 tablet (75 mg) by mouth daily 30 tablet 11     cyclobenzaprine (FLEXERIL) 10 MG tablet Take 1 tablet (10 mg) by mouth 2 times daily as needed for muscle spasms 180 tablet 0     desonide (DESOWEN) 0.05 % cream Apply topically 2 times daily       dicyclomine (BENTYL) 20 MG tablet Take 1 tablet (20 mg) by mouth 4 times daily as needed 60 tablet 5     diphenhydrAMINE (BENADRYL) 25 MG capsule TK 1 TO 2 CS PO QHS  4     EPIPEN 2-RIKY 0.3 MG/0.3ML injection INJECT 0.3 MG INTO THE MUSCLE PRF ANAPHYALAXIS  0     ferrous gluconate (FERGON) 324 (38 Fe) MG tablet Take 1 tablet (324 mg) by mouth 2 times daily 180 tablet 1     fexofenadine (ALLEGRA) 180 MG tablet Take 1 tablet by mouth daily as needed. 90 tablet 3     fluocinolone (SYNALAR) 0.01 % solution Apply topically daily as needed       fluocinonide (LIDEX) 0.05 % external solution Apply topically 2 times daily To areas of itch on the scalp as needed. 60 mL 11     folic acid (FOLVITE) 1 MG tablet Take 1 tablet by mouth daily 90 tablet 3     hydroxychloroquine (PLAQUENIL) 200 MG tablet Take 2 tablets (400 mg) by mouth daily 180 tablet 0     insulin glargine (LANTUS) 100 UNIT/ML injection Inject 40 Units Subcutaneous daily (with breakfast)       insulin pen needle (BD MARCK U/F) 32G X 4 MM USE DAILY OR AS DIRECTED 300 each 3     ketoconazole (NIZORAL) 2 % cream Apply topically as needed   3     ketoconazole (NIZORAL) 2 % shampoo Apply topically daily as needed       lisinopril-hydrochlorothiazide (PRINZIDE/ZESTORETIC) 20-25 MG per tablet Take 1 tablet by  mouth daily 30 tablet 11     Magnesium Oxide -Mg Supplement 250 MG TABS TK 1 T PO BID. REDUCE IF STOOLS LOOSEN  11     metFORMIN (GLUCOPHAGE-XR) 500 MG 24 hr tablet Take 2 tablets (1,000 mg) by mouth daily (with dinner) 180 tablet 1     metoprolol succinate (TOPROL-XL) 100 MG 24 hr tablet Take 1 tablet (100 mg) by mouth daily 30 tablet 11     metroNIDAZOLE (NORITATE) 1 % cream Apply topically daily       mometasone (NASONEX) 50 MCG/ACT spray Spray 2 sprays into both nostrils daily       montelukast (SINGULAIR) 10 MG tablet Take 1 tablet (10 mg) by mouth At Bedtime 30 tablet 1     Multiple Vitamin (DAILY-GERALD) TABS Take 1 tablet by mouth daily  0     nitroGLYcerin (NITROSTAT) 0.4 MG sublingual tablet Place 1 tablet (0.4 mg) under the tongue every 5 minutes as needed for chest pain 25 tablet 1     nystatin (MYCOSTATIN) 753939 UNITS TABS tablet TK 2 TS PO BID  0     olopatadine (PATANOL) 0.1 % ophthalmic solution Place 1 drop into both eyes 2 times daily as needed for allergies. 5 mL 12     Ondansetron HCl (ZOFRAN PO)        pravastatin (PRAVACHOL) 40 MG tablet Take 1 tablet (40 mg) by mouth daily 30 tablet 11     ranitidine (ZANTAC) 150 MG tablet Take 1 tablet (150 mg) by mouth 2 times daily 180 tablet 1     spironolactone (ALDACTONE) 50 MG tablet Take 1 tablet (50 mg) by mouth daily . Take additional 0.5 tablets by mouth once daily as needed for weight gain. 45 tablet 11     sucralfate (CARAFATE) 1 GM tablet Take 1 tablet (1 g) by mouth 4 times daily as needed 120 tablet 3     traMADol (ULTRAM) 50 MG tablet Take 1 tablet (50 mg) by mouth every 8 hours as needed for moderate pain 60 tablet 3     vitamin D (ERGOCALCIFEROL) 13551 UNIT capsule Take 1 capsule (50,000 Units) by mouth every 7 days Need a Vitamin D level in 2 months. (Patient taking differently: Take 50,000 Units by mouth every 7 days Need a Vitamin D level in 2 months.) 8 capsule 0     AEROSPAN 80 MCG/ACT AERS INHALE 2 PUFFS PO BID.  RINSE MOUTH AFTERWARDS   4     azelastine (ASTELIN) 0.1 % spray U 2 SPRAYS IEN BID  0     bacitracin ophthalmic ointment Apply to incision line three times daily. (Patient not taking: Reported on 9/7/2018) 3.5 g 0     BASAGLAR 100 UNIT/ML injection Inject 40 Units Subcutaneous daily (Patient not taking: Reported on 9/7/2018) 12 mL 2     blood glucose monitoring (NO BRAND SPECIFIED) meter device kit Use to test blood sugar 4 X times daily or as directed. 1 kit 0     blood glucose monitoring (NO BRAND SPECIFIED) test strip 1 strip by In Vitro route 4 times daily Test as directed. Please dispense three months and three months of refills. Thank you. 360 each 3     blood glucose monitoring (ONE TOUCH DELICA) lancets Use to test blood sugars 4 times daily or as directed. 4 Box 3     COMPRESSION STOCKINGS 2 each daily (Patient not taking: Reported on 9/7/2018) 4 each 2     COMPRESSION STOCKINGS 2 each daily Apply one 10-15 mmHg compression stocking to each lower extgmierty during the day and remove before bedtime. (Patient not taking: Reported on 9/7/2018) 4 each 2     lidocaine (XYLOCAINE) 5 % ointment Apply 1 g topically 2 times daily as needed for moderate pain (Patient not taking: Reported on 9/7/2018) 50 g 5     SYMBICORT 80-4.5 MCG/ACT Inhaler INHALE 2 PUFFS PO BID RINSE AND EXPECTORATE AFTER  3     No facility-administered encounter medications on file as of 9/7/2018.        Allergies   Allergen Reactions     Amoxicillin-Pot Clavulanate      Augmentin      Unknown possible hives and edema     Azithromycin      Diatrizoate Other (See Comments)     Pt wants this listed because she is allergic to shellfish      Imitrex [Sumatriptan]      Severe face/neck/chest tightness and flushing side effects      Penicillins Hives     Unknown      Pork Allergy      Stomach pain, cramping, diarrhea, itching, nausea and headaches     Shellfish Allergy Hives and Swelling     Sulfa Drugs Hives and Swelling     Zithromax [Azithromycin Dihydrate] Swelling      "Unknown          Ph.E:    Vitals:    09/07/18 1513   BP: 112/72   Pulse: 73   Temp: 98.1  F (36.7  C)   TempSrc: Oral   SpO2: 99%   Weight: 71.3 kg (157 lb 3.2 oz)   Height: 1.6 m (5' 3\")           Constitutional: WD/WN. Pleasant. In no acute distress. Obese. Son present.  Eyes: Swelling around R eye significantly worse since last visit pushing the R eye down, ptosis is present. EOMI  HEENT: No oral ulcers or thrush. Normal salivary pool.  Neck: No cervical LAP, + thyromegaly  Chest: Clear to auscultation bilaterally  CV: RRR, no murmurs/ rubs or gallops. No edema, clubbing or cyanosis.   GI: Abdomen is soft and non tender.  MS: No synovitis or joint tenderness. Cool joints. No joint deformities. Full ROM of the joints. No nodules. +Fibromyalgia trigger points.  Skin: No livedo, periungual erythema, digital ulcers or nail changes. + alopecia with scales, facial malar erythema (looks like seborrhoic dermatitis).  Neuro: A&O x 3. Grossly non focal, muscular power 5/5 in all ext  Psych: NL affect and mood    Assessment/ plan:    1-Seropositive non-erosive RA (arthritis, AM stiffness, high CRP, RF 26 but re-check neg 3/2015 on HCQ) Dx 5/2013, FMS, new pleuritic CP 3/20-15-5/2015 resolved on steroids. She is on  mg bid since 5/2014. She tolerates it well and it's helping some but not enough to control all her pain. Continues having flare ups of joint pain, swelling also has FMS. Joint sx are getting worse. Had high ESR/CRP in 3/2015 and new pleuritic CP between 3/2015-5/2015 which resolved after taking medrol dose pack prescribed 5/20/2015. Her pleuritic CP could be related to her RA. Then stopped tramadol as it blames it for recent episodes of CP.    In the past, MTX was recommended, she declined.    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 " Etiology of mass unclear, but it's inflammatory and related to pt's underlying autoimmune disease (inflammatory arthritis, serositis). 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    Her work up showed borderline + ALLI and slightly elevated MPO. I told her prednisone is not good for her diabetes and weight gain. Recommended a trial of MTX as next step. Discussed risks on details. I called her neuro-ophthalmologist at last visit who agreed with trial of MTX (she suspects pseudo-sarcoidosis or sarcoid like disease but no granuloma and ACE not elevated).     Unfortunately despite all my efforts to explain risks vs benefits (more than risks) of MTX as steroid sparing gent, Janine declined to try MTX. I was very concerned about her R orbital inflammatory mass as there is high chance of flare up off steroids and steroid causes her weigh gain and uncontrolled diabetes. I even offered her other steroid sparing gents like imuran. She declined that as well.    Her inflammation around R orbit has recurred now. On 4/7/2017, I spent quite amount of time discussing a trial of MTX. After discussing risks/benefits, she agreed to try it.     She is s/p Tx with MTX 10 mg po qwk 4/2017-5/2017. No benefits and more GI SEs, more hair loss and headaches since start of MTX. No benefits. I called and spoke with her neuro-ophthalmologist who recommended a second opinion from neuro-ophthalmology at the . I suspect her presentation to be ANCA vasculitis related but wanted to repeat imaging and get neuro-ophthalmology eval here. She was recommended to have repeat biopsy to r/o lymphoma.    In 5/2017, prescribed her prednisone 40-30-20-10 mg a day each for 3 days then stop (she declined higher dose and longer taper despite my concern for worsening swelling around orbit), Her R campos-orbital edema significantly improved. MTX was stopped in 5/2017 given side effects. Plan was to start imuran 50  mg po qd (NL TPMT); however we decided to hold off till she gets biopsy done.    Repeat R lacrimal gland biopsy in 8/2017 showed non specific inflammation, it ruled out lymphoma. Her R orbital swelling is improved but still present. After her biopsy I contacted her to schedule a f/u visit with me to discuss plan of care but she declined till today's visit.    She did not tolerate AZA because of GI SEs.    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     This is definitely an inflammatory process and is steroid responsive, she had local kenalog inj in 8/2017 at the time of biopsy which has helped. Given her diabetes and long term SE of prednisone, highly recommend steroid sparing agent to prevent progression/recurrence of R campos-orbital swelling. Since she did not tolerate MTX and AZA, recommend rituximab as next step.     At last visit in 2/2018, I spent 30 minutes discussing test results, plan of care, rituximab SEs including rare risk of PML. She declined rituximab with concern for SEs.    Unfortunately lost f/u sine 2/2018, today is back with her R eye being much worse, swelling around R orbit is severe and is pushing down her eye. She decided to see an ophthalmologist at [facility], was seen 8/29, was told to have GPA, reportedly had labs done and was told that she would require surgery.    Janine is frustrated with her eye condition, saying nobody told her that she has GPA or Wegener's which is a serious condition and people could die from it.    I reminded her that I discussed the Dx of ANCA-vasculitis which is just another name for Wegener's with her many times but she always declined induction Tx rituximab including today. She said she wants to wait and hear from her ophthalmologist at [facility].  I called asked for records, also left Dr. Shah a message to call me back (his nurse said the doctor is out on vacation till 9/18). I am very worried for Janine as she has this Dx for a while but refused rituximab tx, She can't tolerate more than 30 mg of prednisone a day and I asked her to stay on 30 till she hears back from me.      PLAN:      CT chest/abdomen/pelvis 4/2017 was neg for malignancy. 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 Repeat MPO was positive in 6/2017.    Continue accupuncture.     My impression is that her arthralgias are a combination of IA, fibromyalgia and chronic pain.  Stopped HCQ at last visit, shortly after resumed taking it as arthralgia recurred. Continue HCQ. Eye exam is updated.      2-Fad pads. She was seen by endocrinology and cushing was ruled out in 4/2014. Was advised to do f/u for enlarged thyroid/thyroid nodules.    3-Hair loss. F/U with dermatology    4-Chronic pain. F/U with pain clinic    5-Concerns of subclinical hyperthyroidism: TSH is barely minimal, chart review shows that those lowest levels are since April 2014. At last visit, discussed Endocrinology ref which pt wants to hold off in this visit.     6-Vit D deficiency. Was replaced with vit D 50, 000 units po qwk x 12 wk then 2000 units qd      PLAN: Follow up in 3-4 months    MEDICATION CHANGES: as above      No orders of the defined types were placed in this encounter.              HPI      ROS      Physical Exam      Majo Mckinnon MD

## 2018-09-07 NOTE — PATIENT INSTRUCTIONS
Follow up with Esequiel Shah M.D      Prednisone 30 mg a day till you hear back from me      Return in 3-4 months

## 2018-09-19 ENCOUNTER — TELEPHONE (OUTPATIENT)
Dept: INTERNAL MEDICINE | Facility: CLINIC | Age: 53
End: 2018-09-19

## 2018-09-19 NOTE — TELEPHONE ENCOUNTER
Patient was to have forms faxed over to [facility] back in July, patient brought in the form and patient states that she filled out all information that she needed to fill out and signed SHIRIN. Patient states that [facility] still has not received the information from us. There is not an SHIRIN in the patients chart. Left a message for Maria Luisa at [facility] to call me back to see if they have a SHIRIN they can fax over or if they are able to Care every where records for this patient. Laney Licea LPN 9/19/2018 1:57 PM        Maria Luisa German  [phone]  Fax:[phone]

## 2018-09-19 NOTE — TELEPHONE ENCOUNTER
[facility] Call Center    Phone Message    May a detailed message be left on voicemail: yes    Reason for Call: Other: Pt needs information from her physical sent to acupuncture clinic, pt stated she already gave information to Dr Solano, please call with questions     Action Taken: Message routed to:  [facility] Primary Care Clinic

## 2018-09-21 ENCOUNTER — TELEPHONE (OUTPATIENT)
Dept: RHEUMATOLOGY | Facility: CLINIC | Age: 53
End: 2018-09-21

## 2018-09-21 NOTE — TELEPHONE ENCOUNTER
----- Message from Majo Mckinnon MD sent at 9/21/2018 10:36 AM CDT -----  I spoke with her ophthalmologist on Wed, he recommends surgery to remove inflamed tissue followed by rituximab for ANCA-vasculitis/Wegener's. So she needs to contact him for plan of care moving forward.

## 2018-09-26 ENCOUNTER — TRANSFERRED RECORDS (OUTPATIENT)
Dept: HEALTH INFORMATION MANAGEMENT | Facility: CLINIC | Age: 53
End: 2018-09-26

## 2018-09-27 NOTE — TELEPHONE ENCOUNTER
Have reached out and left several messages with Maria Luisa at [facility] and have not heard anything back. Called patient to update patient that we have not been able to get in contact with patient. Will mail a release of information form out to patient for patient to send back to us. Patient stated verbal understanding and was agreeable with the plan. Laney Licea LPN 9/27/2018 2:04 PM

## 2018-09-29 ENCOUNTER — TRANSFERRED RECORDS (OUTPATIENT)
Dept: HEALTH INFORMATION MANAGEMENT | Facility: CLINIC | Age: 53
End: 2018-09-29

## 2018-10-02 NOTE — TELEPHONE ENCOUNTER
Pt returned call to clinic. She had surgery on her eye last week.  Pt feels like she does not know what this is all about.  She felt like this was too slow of a process and that pain is bad.  She is not currently on antibiotics.  Once she does post-op she would like to continue care at the  rather than going to [place] as it is hard for her to get there and back.  She is waiting for a call from them to set up a post-op visit.  If she needs to get Rituximab she wants it done at the .     Have advised pt that if ophthalmologist needs any help with her medication that she have him connect with Dr. Mckinnon.    Desiree Godinez RN  Rheumatology Clinic

## 2018-10-25 ENCOUNTER — TELEPHONE (OUTPATIENT)
Dept: CARDIOLOGY | Facility: CLINIC | Age: 53
End: 2018-10-25

## 2018-10-25 DIAGNOSIS — Z53.9 ERRONEOUS ENCOUNTER--DISREGARD: Primary | ICD-10-CM

## 2018-10-25 DIAGNOSIS — K58.8 OTHER IRRITABLE BOWEL SYNDROME: ICD-10-CM

## 2018-10-25 DIAGNOSIS — I25.10 CORONARY ARTERY DISEASE INVOLVING NATIVE HEART WITHOUT ANGINA PECTORIS, UNSPECIFIED VESSEL OR LESION TYPE: ICD-10-CM

## 2018-10-25 NOTE — TELEPHONE ENCOUNTER
Prior Authorization Specialty Medication Request    Medication/Dose: Pravastatin 40mg tablets  ICD code (if different than what is on RX):    Previously Tried and Failed:      Important Lab Values:   Rationale:     Insurance Name:   Insurance ID:   Insurance Phone Number:     Pharmacy Information (if different than what is on RX)  Name:  [facility] [id number] [place] [address] AT [address] [place] [address]  Phone:  [phone] Fax# [phone]

## 2018-10-26 DIAGNOSIS — M35.9 UNDIFFERENTIATED CONNECTIVE TISSUE DISEASE (H): ICD-10-CM

## 2018-10-26 RX ORDER — DICYCLOMINE HCL 20 MG
TABLET ORAL
Qty: 60 TABLET | Refills: 3 | Status: SHIPPED | OUTPATIENT
Start: 2018-10-26 | End: 2018-11-01

## 2018-10-26 RX ORDER — TRAMADOL HYDROCHLORIDE 50 MG/1
50 TABLET ORAL EVERY 8 HOURS PRN
Qty: 60 TABLET | Refills: 3 | Status: SHIPPED | OUTPATIENT
Start: 2018-10-26 | End: 2019-05-20

## 2018-10-26 NOTE — TELEPHONE ENCOUNTER
Last seen: 9/7/2018  Pending appt: 1/11/2019  Medication: tramadol 50mg   Last filled: 12/27/2017  Qty: #60 with 3 refills

## 2018-10-26 NOTE — TELEPHONE ENCOUNTER
Central Prior Authorization Team   Phone: [phone]      PA Initiation    Medication: Pravastatin 40mg tablets  Insurance Company: [employer] - Phone [phone] Fax [phone]  Pharmacy Filling the Rx: [facility] [address] [place] [address] AT [address] [place] [address]  Filling Pharmacy Phone:    Filling Pharmacy Fax:    Start Date: 10/26/2018

## 2018-10-29 NOTE — TELEPHONE ENCOUNTER
Signed script faxed to [facility] at [phone]. Patient notified via Mychart.  Beulah Fortune CMA  10/29/2018 11:04 AM

## 2018-10-30 ENCOUNTER — HOSPITAL ENCOUNTER (EMERGENCY)
Facility: CLINIC | Age: 53
Discharge: HOME | End: 2018-10-30
Attending: EMERGENCY MEDICINE | Admitting: EMERGENCY MEDICINE
Payer: COMMERCIAL

## 2018-10-30 ENCOUNTER — TELEPHONE (OUTPATIENT)
Dept: FAMILY MEDICINE | Facility: CLINIC | Age: 53
End: 2018-10-30

## 2018-10-30 ENCOUNTER — APPOINTMENT (OUTPATIENT)
Dept: CT IMAGING | Facility: CLINIC | Age: 53
End: 2018-10-30
Attending: EMERGENCY MEDICINE
Payer: COMMERCIAL

## 2018-10-30 VITALS
WEIGHT: 157.38 LBS | BODY MASS INDEX: 27.88 KG/M2 | DIASTOLIC BLOOD PRESSURE: 83 MMHG | HEART RATE: 79 BPM | RESPIRATION RATE: 16 BRPM | OXYGEN SATURATION: 97 % | SYSTOLIC BLOOD PRESSURE: 138 MMHG | TEMPERATURE: 98.2 F

## 2018-10-30 DIAGNOSIS — R10.13 ABDOMINAL PAIN, EPIGASTRIC: ICD-10-CM

## 2018-10-30 LAB
ALBUMIN SERPL-MCNC: 4.6 G/DL (ref 3.4–5)
ALBUMIN UR-MCNC: 30 MG/DL
ALP SERPL-CCNC: 91 U/L (ref 40–150)
ALT SERPL W P-5'-P-CCNC: 29 U/L (ref 0–50)
ANION GAP SERPL CALCULATED.3IONS-SCNC: 13 MMOL/L (ref 3–14)
APPEARANCE UR: CLEAR
AST SERPL W P-5'-P-CCNC: 17 U/L (ref 0–45)
BACTERIA #/AREA URNS HPF: ABNORMAL /HPF
BASOPHILS # BLD AUTO: 0 10E9/L (ref 0–0.2)
BASOPHILS NFR BLD AUTO: 0.1 %
BILIRUB DIRECT SERPL-MCNC: 0.1 MG/DL (ref 0–0.2)
BILIRUB SERPL-MCNC: 0.5 MG/DL (ref 0.2–1.3)
BILIRUB UR QL STRIP: NEGATIVE
BUN SERPL-MCNC: 30 MG/DL (ref 7–30)
CALCIUM SERPL-MCNC: 10.2 MG/DL (ref 8.5–10.1)
CHLORIDE SERPL-SCNC: 95 MMOL/L (ref 94–109)
CO2 BLDCOV-SCNC: 25 MMOL/L (ref 21–28)
CO2 SERPL-SCNC: 25 MMOL/L (ref 20–32)
COLOR UR AUTO: YELLOW
CREAT SERPL-MCNC: 1.08 MG/DL (ref 0.52–1.04)
DIFFERENTIAL METHOD BLD: ABNORMAL
EOSINOPHIL # BLD AUTO: 0 10E9/L (ref 0–0.7)
EOSINOPHIL NFR BLD AUTO: 0.3 %
ERYTHROCYTE [DISTWIDTH] IN BLOOD BY AUTOMATED COUNT: 13.3 % (ref 10–15)
GFR SERPL CREATININE-BSD FRML MDRD: 53 ML/MIN/1.7M2
GLUCOSE BLDC GLUCOMTR-MCNC: 180 MG/DL (ref 70–99)
GLUCOSE BLDC GLUCOMTR-MCNC: 281 MG/DL (ref 70–99)
GLUCOSE SERPL-MCNC: 287 MG/DL (ref 70–99)
GLUCOSE UR STRIP-MCNC: >1000 MG/DL
HCT VFR BLD AUTO: 40.4 % (ref 35–47)
HGB BLD-MCNC: 13.6 G/DL (ref 11.7–15.7)
HGB UR QL STRIP: ABNORMAL
IMM GRANULOCYTES # BLD: 0 10E9/L (ref 0–0.4)
IMM GRANULOCYTES NFR BLD: 0.3 %
INTERPRETATION ECG - MUSE: NORMAL
KETONES UR STRIP-MCNC: 80 MG/DL
LACTATE BLD-SCNC: 1 MMOL/L (ref 0.7–2.1)
LEUKOCYTE ESTERASE UR QL STRIP: NEGATIVE
LIPASE SERPL-CCNC: 193 U/L (ref 73–393)
LYMPHOCYTES # BLD AUTO: 2.1 10E9/L (ref 0.8–5.3)
LYMPHOCYTES NFR BLD AUTO: 14.2 %
MCH RBC QN AUTO: 26.5 PG (ref 26.5–33)
MCHC RBC AUTO-ENTMCNC: 33.7 G/DL (ref 31.5–36.5)
MCV RBC AUTO: 79 FL (ref 78–100)
MONOCYTES # BLD AUTO: 1 10E9/L (ref 0–1.3)
MONOCYTES NFR BLD AUTO: 6.6 %
MUCOUS THREADS #/AREA URNS LPF: PRESENT /LPF
NEUTROPHILS # BLD AUTO: 11.4 10E9/L (ref 1.6–8.3)
NEUTROPHILS NFR BLD AUTO: 78.5 %
NITRATE UR QL: NEGATIVE
NRBC # BLD AUTO: 0 10*3/UL
NRBC BLD AUTO-RTO: 0 /100
PCO2 BLDV: 35 MM HG (ref 40–50)
PH BLDV: 7.47 PH (ref 7.32–7.43)
PH UR STRIP: 5.5 PH (ref 5–7)
PLATELET # BLD AUTO: 340 10E9/L (ref 150–450)
PO2 BLDV: 39 MM HG (ref 25–47)
POTASSIUM SERPL-SCNC: 4 MMOL/L (ref 3.4–5.3)
PROT SERPL-MCNC: 8.6 G/DL (ref 6.8–8.8)
RBC # BLD AUTO: 5.13 10E12/L (ref 3.8–5.2)
RBC #/AREA URNS AUTO: 1 /HPF (ref 0–2)
SAO2 % BLDV FROM PO2: 78 %
SODIUM SERPL-SCNC: 133 MMOL/L (ref 133–144)
SOURCE: ABNORMAL
SP GR UR STRIP: 1.03 (ref 1–1.03)
SQUAMOUS #/AREA URNS AUTO: 3 /HPF (ref 0–1)
TROPONIN I SERPL-MCNC: <0.015 UG/L (ref 0–0.04)
UROBILINOGEN UR STRIP-MCNC: NORMAL MG/DL (ref 0–2)
WBC # BLD AUTO: 14.5 10E9/L (ref 4–11)
WBC #/AREA URNS AUTO: 3 /HPF (ref 0–5)

## 2018-10-30 PROCEDURE — 93005 ELECTROCARDIOGRAM TRACING: CPT | Performed by: EMERGENCY MEDICINE

## 2018-10-30 PROCEDURE — 99285 EMERGENCY DEPT VISIT HI MDM: CPT | Mod: 25 | Performed by: EMERGENCY MEDICINE

## 2018-10-30 PROCEDURE — 80076 HEPATIC FUNCTION PANEL: CPT | Performed by: EMERGENCY MEDICINE

## 2018-10-30 PROCEDURE — 99284 EMERGENCY DEPT VISIT MOD MDM: CPT | Mod: 25 | Performed by: EMERGENCY MEDICINE

## 2018-10-30 PROCEDURE — 25000128 H RX IP 250 OP 636: Performed by: EMERGENCY MEDICINE

## 2018-10-30 PROCEDURE — 96361 HYDRATE IV INFUSION ADD-ON: CPT | Performed by: EMERGENCY MEDICINE

## 2018-10-30 PROCEDURE — 25000125 ZZHC RX 250: Performed by: EMERGENCY MEDICINE

## 2018-10-30 PROCEDURE — 00000146 ZZHCL STATISTIC GLUCOSE BY METER IP

## 2018-10-30 PROCEDURE — 25000131 ZZH RX MED GY IP 250 OP 636 PS 637: Performed by: EMERGENCY MEDICINE

## 2018-10-30 PROCEDURE — 96375 TX/PRO/DX INJ NEW DRUG ADDON: CPT | Performed by: EMERGENCY MEDICINE

## 2018-10-30 PROCEDURE — 82010 KETONE BODYS QUAN: CPT | Performed by: EMERGENCY MEDICINE

## 2018-10-30 PROCEDURE — 84484 ASSAY OF TROPONIN QUANT: CPT | Performed by: EMERGENCY MEDICINE

## 2018-10-30 PROCEDURE — 96374 THER/PROPH/DIAG INJ IV PUSH: CPT | Mod: 59 | Performed by: EMERGENCY MEDICINE

## 2018-10-30 PROCEDURE — 83605 ASSAY OF LACTIC ACID: CPT

## 2018-10-30 PROCEDURE — 83690 ASSAY OF LIPASE: CPT | Performed by: EMERGENCY MEDICINE

## 2018-10-30 PROCEDURE — 82803 BLOOD GASES ANY COMBINATION: CPT

## 2018-10-30 PROCEDURE — 25000132 ZZH RX MED GY IP 250 OP 250 PS 637: Performed by: EMERGENCY MEDICINE

## 2018-10-30 PROCEDURE — 74177 CT ABD & PELVIS W/CONTRAST: CPT

## 2018-10-30 PROCEDURE — 81001 URINALYSIS AUTO W/SCOPE: CPT | Performed by: EMERGENCY MEDICINE

## 2018-10-30 PROCEDURE — 85025 COMPLETE CBC W/AUTO DIFF WBC: CPT | Performed by: EMERGENCY MEDICINE

## 2018-10-30 PROCEDURE — 80048 BASIC METABOLIC PNL TOTAL CA: CPT | Performed by: EMERGENCY MEDICINE

## 2018-10-30 PROCEDURE — 93010 ELECTROCARDIOGRAM REPORT: CPT | Mod: Z6 | Performed by: EMERGENCY MEDICINE

## 2018-10-30 RX ORDER — IOPAMIDOL 755 MG/ML
77 INJECTION, SOLUTION INTRAVASCULAR ONCE
Status: COMPLETED | OUTPATIENT
Start: 2018-10-30 | End: 2018-10-30

## 2018-10-30 RX ORDER — ONDANSETRON 4 MG/1
4 TABLET, ORALLY DISINTEGRATING ORAL ONCE
Status: COMPLETED | OUTPATIENT
Start: 2018-10-30 | End: 2018-10-30

## 2018-10-30 RX ORDER — METOCLOPRAMIDE 10 MG/1
10 TABLET ORAL
Qty: 90 TABLET | Refills: 0 | Status: SHIPPED | OUTPATIENT
Start: 2018-10-30 | End: 2021-05-18

## 2018-10-30 RX ORDER — OXYCODONE HYDROCHLORIDE 5 MG/1
5 TABLET ORAL ONCE
Status: COMPLETED | OUTPATIENT
Start: 2018-10-30 | End: 2018-10-30

## 2018-10-30 RX ORDER — METOCLOPRAMIDE HYDROCHLORIDE 5 MG/ML
10 INJECTION INTRAMUSCULAR; INTRAVENOUS ONCE
Status: COMPLETED | OUTPATIENT
Start: 2018-10-30 | End: 2018-10-30

## 2018-10-30 RX ORDER — HYDROMORPHONE HYDROCHLORIDE 1 MG/ML
0.5 INJECTION, SOLUTION INTRAMUSCULAR; INTRAVENOUS; SUBCUTANEOUS ONCE
Status: DISCONTINUED | OUTPATIENT
Start: 2018-10-30 | End: 2018-10-30

## 2018-10-30 RX ORDER — OXYCODONE HYDROCHLORIDE 5 MG/1
5 TABLET ORAL EVERY 6 HOURS PRN
Qty: 5 TABLET | Refills: 0 | Status: SHIPPED | OUTPATIENT
Start: 2018-10-30 | End: 2018-12-26

## 2018-10-30 RX ADMIN — ONDANSETRON 4 MG: 4 TABLET, ORALLY DISINTEGRATING ORAL at 17:57

## 2018-10-30 RX ADMIN — SODIUM CHLORIDE, POTASSIUM CHLORIDE, SODIUM LACTATE AND CALCIUM CHLORIDE 1000 ML: 600; 310; 30; 20 INJECTION, SOLUTION INTRAVENOUS at 10:45

## 2018-10-30 RX ADMIN — METOCLOPRAMIDE 10 MG: 5 INJECTION, SOLUTION INTRAMUSCULAR; INTRAVENOUS at 10:46

## 2018-10-30 RX ADMIN — OXYCODONE HYDROCHLORIDE 5 MG: 5 TABLET ORAL at 17:57

## 2018-10-30 RX ADMIN — LIDOCAINE HYDROCHLORIDE 30 ML: 20 SOLUTION ORAL; TOPICAL at 15:53

## 2018-10-30 RX ADMIN — HYDROMORPHONE HYDROCHLORIDE 1 MG: 1 INJECTION, SOLUTION INTRAMUSCULAR; INTRAVENOUS; SUBCUTANEOUS at 12:24

## 2018-10-30 RX ADMIN — SODIUM CHLORIDE, POTASSIUM CHLORIDE, SODIUM LACTATE AND CALCIUM CHLORIDE 1000 ML: 600; 310; 30; 20 INJECTION, SOLUTION INTRAVENOUS at 14:38

## 2018-10-30 RX ADMIN — IOPAMIDOL 77 ML: 755 INJECTION, SOLUTION INTRAVENOUS at 12:58

## 2018-10-30 RX ADMIN — FAMOTIDINE 20 MG: 10 INJECTION, SOLUTION INTRAVENOUS at 10:52

## 2018-10-30 RX ADMIN — SODIUM CHLORIDE 59 ML: 9 INJECTION, SOLUTION INTRAVENOUS at 12:59

## 2018-10-30 RX ADMIN — ONDANSETRON 4 MG: 4 TABLET, ORALLY DISINTEGRATING ORAL at 10:56

## 2018-10-30 NOTE — ED PROVIDER NOTES
[facility] EMERGENCY DEPARTMENT ([facility])    10/30/18     ED 5  10:13 AM   History     Chief Complaint   Patient presents with     Abdominal Pain     back and abdomen pain for 5 days     The history is provided by the patient and medical records.     Janine Cornell is a 53 year old female who presents with abdominal pain and back pain for the past 5 days.  She has a history of ANCA associated vasculitis, fibromyalgia, pseudotumor, type 2 diabetes, coronary artery disease status post 5 stents placed at age 46, ventral hernias and uterine leiomyomas. She started having abdominal pain, nausea, vomiting, as well as a few episodes of diarrhea since last week.  She has not been able to keep down any oral intake. Patient contacted her clinic this morning trying to get an appointment, reported not checking her blood sugar and she only took insulin 2 days over the past week.   She was told to go to the ER for evaluation due to their concerns for DKA. Here she states that her worst symptom is her epigastric abdominal pain that wraps around to her back. She has had ongoing nausea and vomiting with inability to keep any food down. She endorses some left sided chest pain but wonders if this is from her frequent vomiting.  The chest pain is squeezing and feels like electric shocks. She has had chills but no fevers. No dysuria or hematuria. Patient is not on aspirin or Plavix.  She notes history of pancreatitis in the past, ongoing digestion issues.  She is status post cholecystectomy, hiatal hernia repair, appendectomy.  She tried Tylenol and tramadol for her symptoms at home without relief.    I have reviewed the Medications, Allergies, Past Medical and Surgical History, and Social History in the Epic system.    Review of Systems  A ten point ROS was completed with pertinent positives and negatives noted in HPI; otherwise negative.     Physical Exam   BP: (!) 155/96  Pulse: 84  Temp: 98.4  F (36.9  C)  Resp:  16  Weight: 71.4 kg (157 lb 6 oz)  SpO2: 100 %    Physical Exam  Gen: Alert, oriented.  Uncomfortable appearing.  HEENT: Normocephalic. Conjunctivae without injection. No scleral icterus.   CV: RRR, no clear murmur appreciated  Peripheral vascular: No significant lower edema. Warm extremities.   Respiratory: Non-labored breathing on RA. No wheezing or stridor appreciated.   Abdomen/GI: multiple areas tender with minimal touch.  Soft, Non-distended.  Multiple areas of tenderness in the epigastric and left upper quadrant.  Mccormick sign negative. No rebound tenderness appreciated.   : No CVA tenderness.    MSK: No gross bony deformity.   Heme/lymph: No ecchymoses noted.   Neuro: Alert, oriented. CN 2-12 grossly intact.   Psych: No delusions or agitation.     ED Course     ED Course     Procedures             EKG Interpretation:      Interpreted by Rudy Iniguez  Time reviewed: 1040  Symptoms at time of EKG: abdominal pain   Rhythm: normal sinus   Rate: normal  Axis: normal  Ectopy: none  Conduction: normal  ST Segments/ T Waves: No ST-T wave changes  Q Waves: none  Comparison to prior: Unchanged from 2/9/16    Clinical Impression: normal EKG      Labs Ordered and Resulted from Time of ED Arrival Up to the Time of Departure from the ED   GLUCOSE BY METER - Abnormal; Notable for the following:        Result Value    Glucose 281 (*)     All other components within normal limits   CBC WITH PLATELETS DIFFERENTIAL - Abnormal; Notable for the following:     WBC 14.5 (*)     Absolute Neutrophil 11.4 (*)     All other components within normal limits   BASIC METABOLIC PANEL - Abnormal; Notable for the following:     Glucose 287 (*)     Creatinine 1.08 (*)     GFR Estimate 53 (*)     Calcium 10.2 (*)     All other components within normal limits   ROUTINE UA WITH MICROSCOPIC REFLEX TO CULTURE - Abnormal; Notable for the following:     Glucose Urine >1000 (*)     Ketones Urine 80 (*)     Blood Urine Trace (*)     Protein  Albumin Urine 30 (*)     Bacteria Urine Few (*)     Squamous Epithelial /HPF Urine 3 (*)     Mucous Urine Present (*)     All other components within normal limits   GLUCOSE BY METER - Abnormal; Notable for the following:     Glucose 180 (*)     All other components within normal limits   ISTAT  GASES LACTATE MADDY POCT - Abnormal; Notable for the following:     Ph Venous 7.47 (*)     PCO2 Venous 35 (*)     All other components within normal limits   GLUCOSE MONITOR NURSING POCT   HEPATIC PANEL   LIPASE   TROPONIN I   GLUCOSE MONITOR NURSING POCT   BETA HYDROXYBUTYRATE LEVEL   GLUCOSE MONITOR NURSING POCT   ISTAT CG4 GASES LACTATE MADDY NURSING POCT     CT Abdomen Pelvis w Contrast   Final Result   IMPRESSION:   1. Intrauterine device in the uterus. 3 cm calcified uterine fundus   fibroid.   2. Otherwise unremarkable. No cause for epigastric pain identified.      BIANKA MORALES MD           Assessments & Plan (with Medical Decision Making)   Janine Cornell is a 53 year old F with medical history significant for fibromyalgia, rheumatoid arthritis, diabetes, hypertension, chronic pancreatitis presented for evaluation of 5 days of epigastric and left upper quadrant pain. Differential considered included gastritis, PUD, pancreatitis, hepatitis, colitis including ischemic versus inflammatory, ACS, PE, DKA,  cystitis, pyelonephritis among others.  I reviewed nursing notes and patient's vitals on arrival.  Is notable for blood pressure 135/96, no fever, heart rate of 84, SPO2 percent on room air.  Exam notable for uncomfortable but nontoxic appearing female with multiple areas where she is acutely tender to even light touch.  Her main focal areas of tenderness appear to be the epigastric and left upper quadrant region.  She has no chest wall tenderness.  EKG was performed on arrival which showed normal sinus rhythm without evidence of ischemia or arrhythmia and no changes from her previous EKG.  IV access obtained and broad labs  are sent for evaluation of multiple potential etiologies of her symptoms.  Given multiple medications for symptom control including Zofran, Reglan, Pepcid, 2 L of lactated Ringer's, Dilaudid.  Her symptoms have mildly improved.  Her symptoms of her epigastric pain got markedly better after GI cocktail.  After drinking some water she felt like her pain was worsened but had no emesis.  Her labs are notable for white count of 14.5 with left shift.  Her lactic acid level was 1.0 with no evidence of acidosis.  BMP had stable creatinine and sugar of 287.  LFTs and lipase within normal limits.  ACS is less likely with a 5 days of ongoing pain, negative troponin and nonischemic EKG as well as symptoms that change with GI cocktail.  Given her multiple comorbidities and pain a CT abdomen and pelvis with contrast was completed did not show any evidence of acute pathology to explain her symptoms today.  Do not feel like she is in DKA without evidence of acidosis, elevated lactate or anion gap.  UA did not show evidence of cystitis or pyelonephritis however did show glucosuria and elevated ketones.  Repeat sugar was markedly improved with glucose of 180.  Ischemic colitis less likely given ongoing pain times multiple days and negative lactate.  CT without any clear evidence of colitis as well.    Without clear etiology and patient's ability to tolerate oral intake, I feel she is safe for outpatient management at this time and she was given Reglan and oxycodone for short-term symptom control until she is able to follow-up with her primary care provider for additional evaluation.  I spent time reviewing return precautions to the emergency department.    New Prescriptions    METOCLOPRAMIDE (REGLAN) 10 MG TABLET    Take 1 tablet (10 mg) by mouth 4 times daily (before meals and nightly)    OXYCODONE IR (ROXICODONE) 5 MG TABLET    Take 1 tablet (5 mg) by mouth every 6 hours as needed for pain       Final diagnoses:   Abdominal pain,  epigastric     10/30/2018   [facility], [place], EMERGENCY DEPARTMENT     Rudy Iniguez MD  10/30/18 [id number]

## 2018-10-30 NOTE — TELEPHONE ENCOUNTER
Phone call from patient - she sounds ill and weak on phone     1. Called her pcp office this am to get an appointment and they did not have an appointment this am   2. Took a nap and called back and no appointments this am so called  clinic   3. Last week started with stomach pain  - upper stomach across, pain into back bilateral   4. Emesis since last week - throwing up - unable to hold anything down for almost a week   5. A few episodes of diarrhea over the past week   6. Patient is diabetic - HAS NOT CHECKED BLOOD SUGAR  7. Insulin - only took insulin 2 days over the past week, RN advised patient that when ill should take blood sugar prior to using insulin   8. Unable to get into clinic - to hard with traveling and busses transferring     Plan: (concern for DKA)   RN advised patient she should be seen in the ER today - advised to go now   Patient stated she did have someone to drive her to the ER for eval now     Brianna Lee Registered Nurse   [facility]

## 2018-10-30 NOTE — ED AVS SNAPSHOT
[facility], Emergency Department    [address]    [facility] [place] [place] [address]    Phone:  [phone]    Fax:  [phone]                                       Janine Cornell   MRN: [id number]    Department:  [facility], Emergency Department   Date of Visit:  10/30/2018           Patient Information     Date Of Birth          1965        Your diagnoses for this visit were:     Abdominal pain, epigastric        You were seen by Rudy Iniguez MD.      Follow-up Information     Follow up with Kash Solano MD In 3 days.    Specialty:  Family Practice    Why:  for follow up of symptoms     Contact information:    [address]  [facility] [address] [phone]          Discharge Instructions       Your labs and CT scan did not show a clear cause of your pain. Your sugar has improved during your stay. We are giving you additional pain medications and nausea medications for home. Please follow up with your provider this week for additional evaluation or return to the ED with worsening abdominal pain, inability to eat/drink anything.     Your next 10 appointments already scheduled     Jan 11, 2019  3:30 PM CST   (Arrive by 3:15 PM)   Return Visit with Majo Mckinnon MD   [facility] Rhe[facility]ology ([facility] [place])    [address]  [address]  [facility] [address] [phone]              24 Hour Appointment Hotline       To make an appointment at any [facility], call [phone] ([phone]). If you don't have a family doctor or clinic, we will help you find one. [facility] are conveniently located to serve the needs of you and your family.             Review of your medicines      START taking        Dose / Directions Last dose taken    metoclopramide 10 MG tablet   Commonly known as:  REGLAN   Dose:  10 mg   Quantity:  90 tablet        Take 1 tablet (10 mg) by mouth 4 times daily (before meals and nightly)   Refills:  0         oxyCODONE IR 5 MG tablet   Commonly known as:  ROXICODONE   Dose:  5 mg   Quantity:  5 tablet        Take 1 tablet (5 mg) by mouth every 6 hours as needed for pain   Refills:  0          Our records show that you are taking the medicines listed below. If these are incorrect, please call your family doctor or clinic.        Dose / Directions Last dose taken    acetaminophen 325 MG tablet   Commonly known as:  TYLENOL   Dose:  325-650 mg   Quantity:  250 tablet        Take 1-2 tablets (325-650 mg) by mouth every 6 hours as needed for pain (headache)   Refills:  0        AEROSPAN 80 MCG/ACT Aers oral inhaler   Generic drug:  flunisolide HFA        INHALE 2 PUFFS PO BID.  RINSE MOUTH AFTERWARDS   Refills:  4        * albuterol 108 (90 Base) MCG/ACT inhaler   Commonly known as:  PROAIR HFA/PROVENTIL HFA/VENTOLIN HFA   Dose:  2 puff   Quantity:  3 Inhaler        Inhale 2 puffs into the lungs every 6 hours as needed for shortness of breath / dyspnea or wheezing   Refills:  1        * albuterol (2.5 MG/3ML) 0.083% neb solution        INL 1 VIAL VIA NEBULIZATION Q 4 TO 6 HOURS PRN   Refills:  1        ASPIRIN LOW DOSE 81 MG EC tablet   Dose:  81 mg   Quantity:  90 tablet   Generic drug:  aspirin        Take 1 tablet (81 mg) by mouth daily   Refills:  3        azelastine 0.1 % nasal spray   Commonly known as:  ASTELIN        U 2 SPRAYS IEN BID   Refills:  0        bacitracin ophthalmic ointment   Quantity:  3.5 g        Apply to incision line three times daily.   Refills:  0        * insulin glargine 100 UNIT/ML injection   Commonly known as:  LANTUS   Dose:  40 Units        Inject 40 Units Subcutaneous daily (with breakfast)   Refills:  0        * BASAGLAR 100 UNIT/ML injection   Dose:  40 Units   Quantity:  12 mL        Inject 40 Units Subcutaneous daily   Refills:  2        blood glucose monitoring lancets   Quantity:  4 Box        Use to test blood sugars 4 times daily or as directed.   Refills:  3        blood glucose  monitoring meter device kit   Commonly known as:  no brand specified   Quantity:  1 kit        Use to test blood sugar 4 X times daily or as directed.   Refills:  0        blood glucose monitoring test strip   Commonly known as:  no brand specified   Dose:  1 strip   Quantity:  360 each        1 strip by In Vitro route 4 times daily Test as directed. Please dispense three months and three months of refills. Thank you.   Refills:  3        clopidogrel 75 MG tablet   Commonly known as:  PLAVIX   Dose:  75 mg   Quantity:  30 tablet        Take 1 tablet (75 mg) by mouth daily   Refills:  11        * COMPRESSION STOCKINGS   Dose:  2 each   Quantity:  4 each        2 each daily   Refills:  2        * COMPRESSION STOCKINGS   Dose:  2 each   Quantity:  4 each        2 each daily Apply one 10-15 mmHg compression stocking to each lower extgmierty during the day and remove before bedtime.   Refills:  2        cyclobenzaprine 10 MG tablet   Commonly known as:  FLEXERIL   Dose:  10 mg   Quantity:  180 tablet        Take 1 tablet (10 mg) by mouth 2 times daily as needed for muscle spasms   Refills:  0        DAILY-GERALD Tabs   Dose:  1 tablet        Take 1 tablet by mouth daily   Refills:  0        desonide 0.05 % cream   Commonly known as:  DESOWEN        Apply topically 2 times daily   Refills:  0        dicyclomine 20 MG tablet   Commonly known as:  BENTYL   Quantity:  60 tablet        TAKE 1 TABLET(20 MG) BY MOUTH FOUR TIMES DAILY AS NEEDED   Refills:  3        diphenhydrAMINE 25 MG capsule   Commonly known as:  BENADRYL        TK 1 TO 2 CS PO QHS   Refills:  4        EPIPEN 2-RIKY 0.3 MG/0.3ML injection 2-pack   Generic drug:  EPINEPHrine        INJECT 0.3 MG INTO THE MUSCLE PRF ANAPHYALAXIS   Refills:  0        ferrous gluconate 324 (38 Fe) MG tablet   Commonly known as:  FERGON   Dose:  1 tablet   Quantity:  180 tablet        Take 1 tablet (324 mg) by mouth 2 times daily   Refills:  1        fexofenadine 180 MG tablet    Commonly known as:  ALLEGRA   Dose:  180 mg   Quantity:  90 tablet        Take 1 tablet by mouth daily as needed.   Refills:  3        fluocinolone 0.01 % solution   Commonly known as:  SYNALAR        Apply topically daily as needed   Refills:  0        fluocinonide 0.05 % solution   Commonly known as:  LIDEX   Quantity:  60 mL        Apply topically 2 times daily To areas of itch on the scalp as needed.   Refills:  11        folic acid 1 MG tablet   Commonly known as:  FOLVITE   Quantity:  90 tablet        Take 1 tablet by mouth daily   Refills:  3        hydroxychloroquine 200 MG tablet   Commonly known as:  PLAQUENIL   Dose:  400 mg   Quantity:  180 tablet        Take 2 tablets (400 mg) by mouth daily   Refills:  0        insulin pen needle 32G X 4 MM   Commonly known as:  BD MARCK U/F   Quantity:  300 each        USE DAILY OR AS DIRECTED   Refills:  3        * ketoconazole 2 % shampoo   Commonly known as:  NIZORAL        Apply topically daily as needed   Refills:  0        * ketoconazole 2 % cream   Commonly known as:  NIZORAL        Apply topically as needed   Refills:  3        lidocaine 5 % ointment   Commonly known as:  XYLOCAINE   Dose:  1 inch   Quantity:  50 g        Apply 1 g topically 2 times daily as needed for moderate pain   Refills:  5        lisinopril-hydrochlorothiazide 20-25 MG per tablet   Commonly known as:  PRINZIDE/ZESTORETIC   Dose:  1 tablet   Quantity:  30 tablet        Take 1 tablet by mouth daily   Refills:  11        Magnesium Oxide -Mg Supplement 250 MG tablet        TK 1 T PO BID. REDUCE IF STOOLS LOOSEN   Refills:  11        metFORMIN 500 MG 24 hr tablet   Commonly known as:  GLUCOPHAGE-XR   Dose:  1000 mg   Quantity:  180 tablet        Take 2 tablets (1,000 mg) by mouth daily (with dinner)   Refills:  1        metoprolol succinate 100 MG 24 hr tablet   Commonly known as:  TOPROL XL   Dose:  100 mg   Quantity:  30 tablet        Take 1 tablet (100 mg) by mouth daily   Refills:  11         metroNIDAZOLE 1 % cream   Commonly known as:  NORITATE        Apply topically daily   Refills:  0        mometasone 50 MCG/ACT spray   Commonly known as:  NASONEX   Dose:  2 spray        Spray 2 sprays into both nostrils daily   Refills:  0        montelukast 10 MG tablet   Commonly known as:  SINGULAIR   Dose:  10 mg   Quantity:  30 tablet        Take 1 tablet (10 mg) by mouth At Bedtime   Refills:  1        nitroGLYcerin 0.4 MG sublingual tablet   Commonly known as:  NITROSTAT   Dose:  0.4 mg   Quantity:  25 tablet        Place 1 tablet (0.4 mg) under the tongue every 5 minutes as needed for chest pain   Refills:  1        nystatin 108115 units Tabs tablet   Commonly known as:  MYCOSTATIN        TK 2 TS PO BID   Refills:  0        olopatadine 0.1 % ophthalmic solution   Commonly known as:  PATANOL   Dose:  1 drop   Quantity:  5 mL        Place 1 drop into both eyes 2 times daily as needed for allergies.   Refills:  12        pravastatin 40 MG tablet   Commonly known as:  PRAVACHOL   Dose:  40 mg   Quantity:  30 tablet        Take 1 tablet (40 mg) by mouth daily   Refills:  11        ranitidine 150 MG tablet   Commonly known as:  ZANTAC   Dose:  150 mg   Quantity:  180 tablet        Take 1 tablet (150 mg) by mouth 2 times daily   Refills:  1        spironolactone 50 MG tablet   Commonly known as:  ALDACTONE   Dose:  50 mg   Quantity:  45 tablet        Take 1 tablet (50 mg) by mouth daily . Take additional 0.5 tablets by mouth once daily as needed for weight gain.   Refills:  11        sucralfate 1 GM tablet   Commonly known as:  CARAFATE   Dose:  1 g   Quantity:  120 tablet        Take 1 tablet (1 g) by mouth 4 times daily as needed   Refills:  3        SYMBICORT 80-4.5 MCG/ACT Inhaler   Generic drug:  budesonide-formoterol        INHALE 2 PUFFS PO BID RINSE AND EXPECTORATE AFTER   Refills:  3        traMADol 50 MG tablet   Commonly known as:  ULTRAM   Dose:  50 mg   Quantity:  60 tablet        Take 1 tablet  (50 mg) by mouth every 8 hours as needed for moderate pain   Refills:  3        TUMS 500 MG chewable tablet   Dose:  3-4 chew tab   Generic drug:  calcium carbonate        Take 3-4 chew tab by mouth daily as needed.   Refills:  0        vitamin D 95037 UNIT capsule   Commonly known as:  ERGOCALCIFEROL   Dose:  63497 Units   Quantity:  8 capsule        Take 1 capsule (50,000 Units) by mouth every 7 days Need a Vitamin D level in 2 months.   Refills:  0        ZOFRAN PO        Refills:  0        * Notice:  This list has 8 medication(s) that are the same as other medications prescribed for you. Read the directions carefully, and ask your doctor or other care provider to review them with you.            Information about OPIOIDS     PRESCRIPTION OPIOIDS: WHAT YOU NEED TO KNOW   We gave you an opioid (narcotic) pain medicine. It is important to manage your pain, but opioids are not always the best choice. You should first try all the other options your care team gave you. Take this medicine for as short a time (and as few doses) as possible.    Some activities can increase your pain, such as bandage changes or therapy sessions. It may help to take your pain medicine 30 to 60 minutes before these activities. Reduce your stress by getting enough sleep, working on hobbies you enjoy and practicing relaxation or meditation. Talk to your care team about ways to manage your pain beyond prescription opioids.    These medicines have risks:    DO NOT drive when on new or higher doses of pain medicine. These medicines can affect your alertness and reaction times, and you could be arrested for driving under the influence (DUI). If you need to use opioids long-term, talk to your care team about driving.    DO NOT operate heavy machinery    DO NOT do any other dangerous activities while taking these medicines.    DO NOT drink any alcohol while taking these medicines.     If the opioid prescribed includes acetaminophen, DO NOT take  with any other medicines that contain acetaminophen. Read all labels carefully. Look for the word  acetaminophen  or  Tylenol.  Ask your pharmacist if you have questions or are unsure.    You can get addicted to pain medicines, especially if you have a history of addiction (chemical, alcohol or substance dependence). Talk to your care team about ways to reduce this risk.    All opioids tend to cause constipation. Drink plenty of water and eat foods that have a lot of fiber, such as fruits, vegetables, prune juice, apple juice and high-fiber cereal. Take a laxative (Miralax, milk of magnesia, Colace, Senna) if you don t move your bowels at least every other day. Other side effects include upset stomach, sleepiness, dizziness, throwing up, tolerance (needing more of the medicine to have the same effect), physical dependence and slowed breathing.    Store your pills in a secure place, locked if possible. We will not replace any lost or stolen medicine. If you don t finish your medicine, please throw away (dispose) as directed by your pharmacist. The [place] [employer] has more information about safe disposal: https://www.pca.state.[place]/living-green/managing-unwanted-medications        Prescriptions were sent or printed at these locations (2 Prescriptions)                   Other Prescriptions                Printed at Department/Unit printer (2 of 2)         metoclopramide (REGLAN) 10 MG tablet               oxyCODONE IR (ROXICODONE) 5 MG tablet                Procedures and tests performed during your visit     Procedure/Test Number of Times Performed    Basic metabolic panel 1    Beta hydroxybutyrate level 1    CBC with platelets differential 1    CT Abdomen Pelvis w Contrast 1    EKG 12 lead 1    Glucose by meter 2    Glucose monitor nursing POCT 3    Hepatic panel 1    ISTAT CG4 gases lactate milan nursing POCT 1    ISTAT gases lactate milan POCT 1    Lipase 1    Troponin I 1    UA with Microscopic  reflex to Culture 1      Orders Needing Specimen Collection     None      Pending Results     Date and Time Order Name Status Description    10/30/2018 1039 Beta hydroxybutyrate level In process             Pending Culture Results     No orders found from 10/28/2018 to 10/31/2018.            Pending Results Instructions     If you had any lab results that were not finalized at the time of your Discharge, you can call the ED Lab Result RN at [phone]. You will be contacted by this team for any positive Lab results or changes in treatment. The nurses are available 7 days a week from 10A to 6:30P.  You can leave a message 24 hours per day and they will return your call.        Thank you for choosing [facility]       Thank you for choosing [facility] for your care. Our goal is always to provide you with excellent care. Hearing back from our patients is one way we can continue to improve our services. Please take a few minutes to complete the written survey that you may receive in the mail after you visit with us. Thank you!        [employer] Information     [email or web address] gives you secure access to your electronic health record. If you see a primary care provider, you can also send messages to your care team and make appointments. If you have questions, please call your primary care clinic.  If you do not have a primary care provider, please call [phone] and they will assist you.        Care EveryWhere ID     This is your Care EveryWhere ID. This could be used by other organizations to access your [facility] medical records  [id number]        Equal Access to Services     MANDA QUINN : Hadii frederick June, acacia watters, qaybta gagandeep loya. So [facility] [phone].    ATENCIÓN: Si habla español, tiene a burch disposición servicios gratuitos de asistencia lingüística. Llbenjamin al [phone].    We comply with applicable federal civil rights laws and [place] laws. We do  not discriminate on the basis of race, color, national origin, age, disability, sex, sexual orientation, or gender identity.            After Visit Summary       This is your record. Keep this with you and show to your community pharmacist(s) and doctor(s) at your next visit.

## 2018-10-30 NOTE — DISCHARGE INSTRUCTIONS
Your labs and CT scan did not show a clear cause of your pain. Your sugar has improved during your stay. We are giving you additional pain medications and nausea medications for home. Please follow up with your provider this week for additional evaluation or return to the ED with worsening abdominal pain, inability to eat/drink anything.

## 2018-10-30 NOTE — ED AVS SNAPSHOT
[facility], Emergency Department    [address]    [facility] [address]    Phone:  [phone]    Fax:  [phone]                                       Janine Cornell   MRN: [id number]    Department:  [facility], Emergency Department   Date of Visit:  10/30/2018           After Visit Summary Signature Page     I have received my discharge instructions, and my questions have been answered. I have discussed any challenges I see with this plan with the nurse or doctor.    ..........................................................................................................................................  Patient/Patient Representative Signature      ..........................................................................................................................................  Patient Representative Print Name and Relationship to Patient    ..................................................               ................................................  Date                                   Time    ..........................................................................................................................................  Reviewed by Signature/Title    ...................................................              ..............................................  Date                                               Time          22EPIC Rev 08/18

## 2018-10-31 DIAGNOSIS — E11.9 TYPE 2 DIABETES MELLITUS NOT AT GOAL (H): ICD-10-CM

## 2018-10-31 LAB — B-OH-BUTYR SERPL-MCNC: 20.6 MG/DL (ref 0–3)

## 2018-10-31 NOTE — TELEPHONE ENCOUNTER
CaseId:[id number];Status:Cancelled;Explanation:PA not Required.The Prescribed limit is below the plan limit.

## 2018-11-01 ENCOUNTER — OFFICE VISIT (OUTPATIENT)
Dept: FAMILY MEDICINE | Facility: CLINIC | Age: 53
End: 2018-11-01
Payer: COMMERCIAL

## 2018-11-01 VITALS
TEMPERATURE: 98.2 F | HEART RATE: 75 BPM | DIASTOLIC BLOOD PRESSURE: 84 MMHG | SYSTOLIC BLOOD PRESSURE: 117 MMHG | OXYGEN SATURATION: 98 %

## 2018-11-01 DIAGNOSIS — K58.8 OTHER IRRITABLE BOWEL SYNDROME: ICD-10-CM

## 2018-11-01 DIAGNOSIS — R11.0 NAUSEA: ICD-10-CM

## 2018-11-01 DIAGNOSIS — R10.13 ABDOMINAL PAIN, EPIGASTRIC: Primary | ICD-10-CM

## 2018-11-01 RX ORDER — SUCRALFATE 1 G/1
1 TABLET ORAL 4 TIMES DAILY
Qty: 120 TABLET | Refills: 3 | Status: SHIPPED | OUTPATIENT
Start: 2018-11-01 | End: 2019-11-05

## 2018-11-01 RX ORDER — METFORMIN HCL 500 MG
TABLET, EXTENDED RELEASE 24 HR ORAL
Qty: 180 TABLET | Refills: 0 | Status: SHIPPED | OUTPATIENT
Start: 2018-11-01 | End: 2021-05-18

## 2018-11-01 RX ORDER — DICYCLOMINE HCL 20 MG
TABLET ORAL
Qty: 60 TABLET | Refills: 3 | Status: SHIPPED | OUTPATIENT
Start: 2018-11-01 | End: 2019-11-12

## 2018-11-01 RX ORDER — ONDANSETRON 8 MG/1
8 TABLET, ORALLY DISINTEGRATING ORAL EVERY 8 HOURS PRN
Qty: 60 TABLET | Refills: 1 | Status: SHIPPED | OUTPATIENT
Start: 2018-11-01 | End: 2021-05-18

## 2018-11-01 ASSESSMENT — PAIN SCALES - GENERAL: PAINLEVEL: EXTREME PAIN (8)

## 2018-11-01 NOTE — PATIENT INSTRUCTIONS
[facility] Medication Refill Request Information:  * Please contact your pharmacy regarding ANY request for medication refills.  ** [facility] Prescription Fax = [phone]  * Please allow 3 business days for routine medication refills.  * Please allow 5 business days for controlled substance medication refills.     [facility] Test Result notification information:  *You will be notified with in 7-10 days of your appointment day regarding the results of your test.  If you are on MyChart you will be notified as soon as the provider has reviewed the results and signed off on them.    [facility]: [phone]     Upper Endoscopy at [facility] [phone]

## 2018-11-01 NOTE — MR AVS SNAPSHOT
After Visit Summary   11/1/2018    Janine Cornell    MRN: [id number]           Patient Information     Date Of Birth          1965        Visit Information        Provider Department      11/1/2018 12:20 PM Kash Solano MD [facility]        Today's Diagnoses     Abdominal pain, epigastric    -  1    Nausea          Care Instructions    Primary Care Center Medication Refill Request Information:  * Please contact your pharmacy regarding ANY request for medication refills.  ** [facility] Prescription Fax = [phone]  * Please allow 3 business days for routine medication refills.  * Please allow 5 business days for controlled substance medication refills.     Primary Care Center Test Result notification information:  *You will be notified with in 7-10 days of your appointment day regarding the results of your test.  If you are on MyChart you will be notified as soon as the provider has reviewed the results and signed off on them.    [facility]: [phone]     Upper Endoscopy at [facility] [phone]          Follow-ups after your visit        Additional Services     GASTROENTEROLOGY ADULT REF PROCEDURE ONLY       Last Lab Result: Creatinine (mg/dL)       Date                     Value                 10/30/2018               1.08 (H)         ----------  There is no height or weight on file to calculate BMI.     Needed:  No  Language:  English    Patient will be contacted to schedule procedure.     Please be aware that coverage of these services is subject to the terms and limitations of your health insurance plan.  Call member services at your health plan with any benefit or coverage questions.  Any procedures must be performed at a [place] facility OR coordinated by your clinic's referral office.    Please bring the following with you to your appointment:    (1) Any X-Rays, CTs or MRIs which have been performed.  Contact the facility where they  were done to arrange for  prior to your scheduled appointment.    (2) List of current medications   (3) This referral request   (4) Any documents/labs given to you for this referral                  Your next 10 appointments already scheduled     Jan 11, 2019  3:30 PM CST   (Arrive by 3:15 PM)   Return Visit with Majo Mckinnon MD   [facility] ([facility])    [address]  [address]  [facility] [address] [phone]              Who to contact     Please call your clinic at [phone] to:    Ask questions about your health    Make or cancel appointments    Discuss your medicines    Learn about your test results    Speak to your doctor            Additional Information About Your Visit        [employer] Information     [employer] gives you secure access to your electronic health record. If you see a primary care provider, you can also send messages to your care team and make appointments. If you have questions, please call your primary care clinic.  If you do not have a primary care provider, please call [phone] and they will assist you.      [employer] is an electronic gateway that provides easy, online access to your medical records. With [employer], you can request a clinic appointment, read your test results, renew a prescription or communicate with your care team.     To access your existing account, please contact your [facility] or call [phone] for assistance.        Care EveryWhere ID     This is your Care EveryWhere ID. This could be used by other organizations to access your [place] medical records  [id number]        Your Vitals Were     Pulse Temperature Pulse Oximetry             75 98.2  F (36.8  C) (Oral) 98%          Blood Pressure from Last 3 Encounters:   11/01/18 117/84   10/30/18 138/83   09/07/18 112/72    Weight from Last 3 Encounters:   10/30/18 71.4 kg (157 lb 6 oz)   09/07/18 71.3 kg (157 lb 3.2 oz)    07/25/18 74.4 kg (164 lb)              We Performed the Following     GASTROENTEROLOGY ADULT REF PROCEDURE ONLY          Today's Medication Changes          These changes are accurate as of 11/1/18  2:50 PM.  If you have any questions, ask your nurse or doctor.               Start taking these medicines.        Dose/Directions    lidocaine (viscous), alum & mag hydroxide-simethicone GI Cocktail   Used for:  Abdominal pain, epigastric   Started by:  Kash Solano MD        30 ml times on po now   Quantity:  30 mL   Refills:  0       ondansetron 8 MG ODT tab   Commonly known as:  ZOFRAN-ODT   Used for:  Nausea   Started by:  Kash Solano MD        Dose:  8 mg   Take 1 tablet (8 mg) by mouth every 8 hours as needed for nausea   Quantity:  60 tablet   Refills:  1         These medicines have changed or have updated prescriptions.        Dose/Directions    * sucralfate 1 GM tablet   Commonly known as:  CARAFATE   This may have changed:  Another medication with the same name was added. Make sure you understand how and when to take each.   Used for:  Abdominal pain, epigastric   Changed by:  Kash Solano MD        Dose:  1 g   Take 1 tablet (1 g) by mouth 4 times daily as needed   Quantity:  120 tablet   Refills:  3       * sucralfate 1 GM tablet   Commonly known as:  CARAFATE   This may have changed:  You were already taking a medication with the same name, and this prescription was added. Make sure you understand how and when to take each.   Used for:  Abdominal pain, epigastric   Changed by:  Kash Solano MD        Dose:  1 g   Take 1 tablet (1 g) by mouth 4 times daily   Quantity:  120 tablet   Refills:  3       vitamin D2 09459 UNIT capsule   Commonly known as:  ERGOCALCIFEROL   This may have changed:  additional instructions   Used for:  Vitamin D deficiency        Dose:  07673 Units   Take 1 capsule (50,000 Units) by mouth every 7 days Need a Vitamin D level in 2 months.   Quantity:  8  capsule   Refills:  0       * Notice:  This list has 2 medication(s) that are the same as other medications prescribed for you. Read the directions carefully, and ask your doctor or other care provider to review them with you.         Where to get your medicines      These medications were sent to [facility] [address] [place] [address] AT [address] [place] [address] & [address] [address]     Phone:  [phone]     ondansetron 8 MG ODT tab    sucralfate 1 GM tablet         Some of these will need a paper prescription and others can be bought over the counter.  Ask your nurse if you have questions.     Bring a paper prescription for each of these medications     lidocaine (viscous), alum & mag hydroxide-simethicone GI Cocktail                Primary Care Provider Office Phone # Fax #    Kash Solano [phone]       [address] [address]        Equal Access to Services     MANDA QUINN AH: Hadii aad ku hadasho Soomaali, waaxda luqadaha, qaybta kaalmada adeegyada, waxay ervinin haykei phillip . So [facility] [phone].    ATENCIÓN: Si habla español, tiene a burch disposición servicios gratuitos de asistencia lingüística. Llame al [phone].    We comply with applicable federal civil rights laws and [place] laws. We do not discriminate on the basis of race, color, national origin, age, disability, sex, sexual orientation, or gender identity.            Thank you!     Thank you for choosing [facility]  for your care. Our goal is always to provide you with excellent care. Hearing back from our patients is one way we can continue to improve our services. Please take a few minutes to complete the written survey that you may receive in the mail after your visit with us. Thank you!             Your Updated Medication List - Protect others around you: Learn how to safely use, store and throw away your  medicines at www.disposemymeds.org.          This list is accurate as of 11/1/18  2:50 PM.  Always use your most recent med list.                   Brand Name Dispense Instructions for use Diagnosis    acetaminophen 325 MG tablet    TYLENOL    250 tablet    Take 1-2 tablets (325-650 mg) by mouth every 6 hours as needed for pain (headache)    Other chronic pain, Headache(784.0)       AEROSPAN 80 MCG/ACT Aers oral inhaler   Generic drug:  flunisolide HFA      INHALE 2 PUFFS PO BID.  RINSE MOUTH AFTERWARDS        * albuterol 108 (90 Base) MCG/ACT inhaler    PROAIR HFA/PROVENTIL HFA/VENTOLIN HFA    3 Inhaler    Inhale 2 puffs into the lungs every 6 hours as needed for shortness of breath / dyspnea or wheezing        * albuterol (2.5 MG/3ML) 0.083% neb solution      INL 1 VIAL VIA NEBULIZATION Q 4 TO 6 HOURS PRN        ASPIRIN LOW DOSE 81 MG EC tablet   Generic drug:  aspirin     90 tablet    Take 1 tablet (81 mg) by mouth daily    Hyperlipidemia LDL goal <70, HTN (hypertension)       azelastine 0.1 % nasal spray    ASTELIN     U 2 SPRAYS IEN BID        bacitracin ophthalmic ointment     3.5 g    Apply to incision line three times daily.    Postoperative eye state       * insulin glargine 100 UNIT/ML injection    LANTUS     Inject 40 Units Subcutaneous daily (with breakfast)        * BASAGLAR 100 UNIT/ML injection     12 mL    Inject 40 Units Subcutaneous daily    Type 2 diabetes mellitus without complication (H)       blood glucose monitoring lancets     4 Box    Use to test blood sugars 4 times daily or as directed.    Type 2 diabetes, HbA1c goal < 7% (H)       blood glucose monitoring meter device kit    no brand specified    1 kit    Use to test blood sugar 4 X times daily or as directed.    Type 2 diabetes, HbA1c goal < 7% (H)       blood glucose monitoring test strip    no brand specified    360 each    1 strip by In Vitro route 4 times daily Test as directed. Please dispense three months and three months of  refills. Thank you.    Type 2 diabetes, HbA1c goal < 7% (H)       clopidogrel 75 MG tablet    PLAVIX    30 tablet    Take 1 tablet (75 mg) by mouth daily        * COMPRESSION STOCKINGS     4 each    2 each daily    Bilateral lower extremity edema, Venous incompetence       * COMPRESSION STOCKINGS     4 each    2 each daily Apply one 10-15 mmHg compression stocking to each lower extgmierty during the day and remove before bedtime.    Venous incompetence, Bilateral lower extremity edema       cyclobenzaprine 10 MG tablet    FLEXERIL    180 tablet    Take 1 tablet (10 mg) by mouth 2 times daily as needed for muscle spasms    Fibromyalgia       DAILY-GERALD Tabs      Take 1 tablet by mouth daily        desonide 0.05 % cream    DESOWEN     Apply topically 2 times daily        dicyclomine 20 MG tablet    BENTYL    60 tablet    TAKE 1 TABLET(20 MG) BY MOUTH FOUR TIMES DAILY AS NEEDED    Other irritable bowel syndrome       diphenhydrAMINE 25 MG capsule    BENADRYL     TK 1 TO 2 CS PO QHS        EPIPEN 2-RIKY 0.3 MG/0.3ML injection 2-pack   Generic drug:  EPINEPHrine      INJECT 0.3 MG INTO THE MUSCLE PRF ANAPHYALAXIS        ferrous gluconate 324 (38 Fe) MG tablet    FERGON    180 tablet    Take 1 tablet (324 mg) by mouth 2 times daily    AILEEN (iron deficiency anemia)       fexofenadine 180 MG tablet    ALLEGRA    90 tablet    Take 1 tablet by mouth daily as needed.    Chronic rhinitis       fluocinolone 0.01 % solution    SYNALAR     Apply topically daily as needed        fluocinonide 0.05 % solution    LIDEX    60 mL    Apply topically 2 times daily To areas of itch on the scalp as needed.    Telogen effluvium       folic acid 1 MG tablet    FOLVITE    90 tablet    Take 1 tablet by mouth daily    Inflammatory arthritis       hydroxychloroquine 200 MG tablet    PLAQUENIL    180 tablet    Take 2 tablets (400 mg) by mouth daily    Inflammatory arthritis       insulin pen needle 32G X 4 MM    BD MARCK U/F    300 each    USE DAILY  OR AS DIRECTED    Type 2 diabetes, HbA1c goal < 7% (H)       * ketoconazole 2 % shampoo    NIZORAL     Apply topically daily as needed        * ketoconazole 2 % cream    NIZORAL     Apply topically as needed        lidocaine (viscous), alum & mag hydroxide-simethicone GI Cocktail     30 mL    30 ml times on po now    Abdominal pain, epigastric       lidocaine 5 % ointment    XYLOCAINE    50 g    Apply 1 g topically 2 times daily as needed for moderate pain    Fibromyalgia, Rheumatoid arthritis involving both wrists with positive rheumatoid factor (H)       lisinopril-hydrochlorothiazide 20-25 MG per tablet    PRINZIDE/ZESTORETIC    30 tablet    Take 1 tablet by mouth daily    Hypertension, unspecified type       Magnesium Oxide -Mg Supplement 250 MG tablet      TK 1 T PO BID. REDUCE IF STOOLS LOOSEN        metFORMIN 500 MG 24 hr tablet    GLUCOPHAGE-XR    180 tablet    TAKE 2 TABLETS(1000 MG) BY MOUTH DAILY WITH DINNER    Type 2 diabetes mellitus not at goal (H)       metoclopramide 10 MG tablet    REGLAN    90 tablet    Take 1 tablet (10 mg) by mouth 4 times daily (before meals and nightly)        metoprolol succinate 100 MG 24 hr tablet    TOPROL XL    30 tablet    Take 1 tablet (100 mg) by mouth daily    Benign essential hypertension       metroNIDAZOLE 1 % cream    NORITATE     Apply topically daily        mometasone 50 MCG/ACT spray    NASONEX     Spray 2 sprays into both nostrils daily        montelukast 10 MG tablet    SINGULAIR    30 tablet    Take 1 tablet (10 mg) by mouth At Bedtime    Uncomplicated asthma, unspecified asthma severity       nitroGLYcerin 0.4 MG sublingual tablet    NITROSTAT    25 tablet    Place 1 tablet (0.4 mg) under the tongue every 5 minutes as needed for chest pain    NSTEMI (non-ST elevated myocardial infarction) (H)       nystatin 147170 units Tabs tablet    MYCOSTATIN     TK 2 TS PO BID        olopatadine 0.1 % ophthalmic solution    PATANOL    5 mL    Place 1 drop into both eyes  2 times daily as needed for allergies.    Chronic rhinitis       ondansetron 8 MG ODT tab    ZOFRAN-ODT    60 tablet    Take 1 tablet (8 mg) by mouth every 8 hours as needed for nausea    Nausea       oxyCODONE IR 5 MG tablet    ROXICODONE    5 tablet    Take 1 tablet (5 mg) by mouth every 6 hours as needed for pain        pravastatin 40 MG tablet    PRAVACHOL    30 tablet    Take 1 tablet (40 mg) by mouth daily    Coronary artery disease involving native heart without angina pectoris, unspecified vessel or lesion type       ranitidine 150 MG tablet    ZANTAC    180 tablet    Take 1 tablet (150 mg) by mouth 2 times daily    Gastritis       spironolactone 50 MG tablet    ALDACTONE    45 tablet    Take 1 tablet (50 mg) by mouth daily . Take additional 0.5 tablets by mouth once daily as needed for weight gain.    Hypertension goal BP (blood pressure) < 140/90       * sucralfate 1 GM tablet    CARAFATE    120 tablet    Take 1 tablet (1 g) by mouth 4 times daily as needed    Abdominal pain, epigastric       * sucralfate 1 GM tablet    CARAFATE    120 tablet    Take 1 tablet (1 g) by mouth 4 times daily    Abdominal pain, epigastric       SYMBICORT 80-4.5 MCG/ACT Inhaler   Generic drug:  budesonide-formoterol      INHALE 2 PUFFS PO BID RINSE AND EXPECTORATE AFTER        traMADol 50 MG tablet    ULTRAM    60 tablet    Take 1 tablet (50 mg) by mouth every 8 hours as needed for moderate pain    Undifferentiated connective tissue disease (H)       TUMS 500 MG chewable tablet   Generic drug:  calcium carbonate      Take 3-4 chew tab by mouth daily as needed.        vitamin D2 64740 UNIT capsule    ERGOCALCIFEROL    8 capsule    Take 1 capsule (50,000 Units) by mouth every 7 days Need a Vitamin D level in 2 months.    Vitamin D deficiency       ZOFRAN PO           * Notice:  This list has 10 medication(s) that are the same as other medications prescribed for you. Read the directions carefully, and ask your doctor or other care  provider to review them with you.

## 2018-11-01 NOTE — NURSING NOTE
Chief Complaint   Patient presents with     Hospital F/U     Pt is here to follow up from hospital.      Radha Baker LPN at 1:12 PM on 11/1/2018.

## 2018-11-01 NOTE — PROGRESS NOTES
[facility]  Primary Care Center   Kash Solano MD  11/01/2018      Chief Complaint:   Hospital F/U     History of Present Illness:   Janine Cornell is a 53 year old female with a complex medical history who presents for evaluation of hospital follow up.The patient presented to the emergency room on 10/30/2018 for epigastric abdominal pain. She states that se started having stomach pain with diarrhea for the first two days. After the first two days the diarrhea stopped, and was vomiting any time she tried to eat or drink. Further she states that it felt like there was a tight rope tied around her chest and could not get any food down. She was weak, nauseas, and not able to take any of her medications. With a large work up, there was no clear etiology for the pain. She was given a GI cocktail, which helped her pain substantially. However, she was released with Reglan and oxycodone when the pain had decreased.    She states that since the hospital visit that pain has come back, and the only relief she can get is from putting weight on her stomach and and ice pack. She states that she has not had a bowel movement in days, but denies any urinary trouble. She does not note any fever, but does endorse feeling very hot or having chills. She also endorses having bloating. She states that if she doesn't eat she will still just feel like she is going to pass out. She asked to have a refill of her Zofran, sucralfate, and dicyclomine, however is not sure if they have refilled this. She has just finished the prescription of oxycodone and [place] for the hospital, however that did not seem to help much. She is a smoker, but denies any alcohol consumption. She has tried taking Tums to help, however that has not given any relief either. She also states that she use to be on omeprazole, but had to stop this since she started her new heart medication. She is now on Zantac.     Last Wednesday is when she started with bad stomach pain  out of the blew. She has had this pain before but the pain use to be managed with acupuncture. They have not gotten back to her with when she can restart, as they claim they never received the physical she had done here to authorize her for the acupuncture. She now has no way to manage her pain.    She has also been to [facility] for tumor removal surgery. Healing is slow and has reoccurring swelling when lying down. She notes that all around her right eye down to her lips will swell since the surgery. She also states that she has drainage coming from her eye. She notes that she has trouble getting up to [facility], as her does not have a consistent ride there. This makes it challenging for her to have follow up appointments with her surgeon. She states that she had been on prednisone, which she stopped about two weeks ago.      Review of Systems:   Pertinent items are noted in HPI, remainder of complete ROS is negative.      Active Medications:      AEROSPAN 80 MCG/ACT AERS, INHALE 2 PUFFS PO BID.  RINSE MOUTH AFTERWARDS, Disp: , Rfl: 4     albuterol (2.5 MG/3ML) 0.083% neb solution, INL 1 VIAL VIA NEBULIZATION Q 4 TO 6 HOURS PRN, Disp: , Rfl: 1     albuterol (PROAIR HFA, PROVENTIL HFA, VENTOLIN HFA) 108 (90 BASE) MCG/ACT inhaler, Inhale 2 puffs into the lungs every 6 hours as needed for shortness of breath / dyspnea or wheezing, Disp: 3 Inhaler, Rfl: 1     ASPIRIN LOW DOSE 81 MG EC tablet, Take 1 tablet (81 mg) by mouth daily, Disp: 90 tablet, Rfl: 3     azelastine (ASTELIN) 0.1 % spray, U 2 SPRAYS IEN BID, Disp: , Rfl: 0     bacitracin ophthalmic ointment, Apply to incision line three times daily. (Patient not taking: Reported on 9/7/2018), Disp: 3.5 g, Rfl: 0     BASAGLAR 100 UNIT/ML injection, Inject 40 Units Subcutaneous daily, Disp: 12 mL, Rfl: 2     calcium carbonate (TUMS) 500 MG chewable tablet, Take 3-4 chew tab by mouth daily as needed., Disp: , Rfl:      clopidogrel (PLAVIX) 75 MG tablet, Take 1  tablet (75 mg) by mouth daily, Disp: 30 tablet, Rfl: 11     cyclobenzaprine (FLEXERIL) 10 MG tablet, Take 1 tablet (10 mg) by mouth 2 times daily as needed for muscle spasms, Disp: 180 tablet, Rfl: 0     desonide (DESOWEN) 0.05 % cream, Apply topically 2 times daily, Disp: , Rfl:      dicyclomine (BENTYL) 20 MG tablet, TAKE 1 TABLET(20 MG) BY MOUTH FOUR TIMES DAILY AS NEEDED, Disp: 60 tablet, Rfl: 3     diphenhydrAMINE (BENADRYL) 25 MG capsule, TK 1 TO 2 CS PO QHS, Disp: , Rfl: 4     EPIPEN 2-RIKY 0.3 MG/0.3ML injection, INJECT 0.3 MG INTO THE MUSCLE PRF ANAPHYALAXIS, Disp: , Rfl: 0     ferrous gluconate (FERGON) 324 (38 Fe) MG tablet, Take 1 tablet (324 mg) by mouth 2 times daily, Disp: 180 tablet, Rfl: 1     fexofenadine (ALLEGRA) 180 MG tablet, Take 1 tablet by mouth daily as needed., Disp: 90 tablet, Rfl: 3     fluocinolone (SYNALAR) 0.01 % solution, Apply topically daily as needed, Disp: , Rfl:      fluocinonide (LIDEX) 0.05 % external solution, Apply topically 2 times daily To areas of itch on the scalp as needed., Disp: 60 mL, Rfl: 11     folic acid (FOLVITE) 1 MG tablet, Take 1 tablet by mouth daily, Disp: 90 tablet, Rfl: 3     hydroxychloroquine (PLAQUENIL) 200 MG tablet, Take 2 tablets (400 mg) by mouth daily, Disp: 180 tablet, Rfl: 0     insulin glargine (LANTUS) 100 UNIT/ML injection, Inject 40 Units Subcutaneous daily (with breakfast), Disp: , Rfl:      ketoconazole (NIZORAL) 2 % cream, Apply topically as needed , Disp: , Rfl: 3     ketoconazole (NIZORAL) 2 % shampoo, Apply topically daily as needed, Disp: , Rfl:      lidocaine (XYLOCAINE) 5 % ointment, Apply 1 g topically 2 times daily as needed for moderate pain (Patient not taking: Reported on 9/7/2018), Disp: 50 g, Rfl: 5     lisinopril-hydrochlorothiazide (PRINZIDE/ZESTORETIC) 20-25 MG per tablet, Take 1 tablet by mouth daily, Disp: 30 tablet, Rfl: 11     Magnesium Oxide -Mg Supplement 250 MG TABS, TK 1 T PO BID. REDUCE IF STOOLS LOOSEN, Disp: ,  Rfl: 11     metFORMIN (GLUCOPHAGE-XR) 500 MG 24 hr tablet, TAKE 2 TABLETS(1000 MG) BY MOUTH DAILY WITH DINNER, Disp: 180 tablet, Rfl: 0     metoclopramide (REGLAN) 10 MG tablet, Take 1 tablet (10 mg) by mouth 4 times daily (before meals and nightly), Disp: 90 tablet, Rfl: 0     metoprolol succinate (TOPROL-XL) 100 MG 24 hr tablet, Take 1 tablet (100 mg) by mouth daily, Disp: 30 tablet, Rfl: 11     metroNIDAZOLE (NORITATE) 1 % cream, Apply topically daily, Disp: , Rfl:      mometasone (NASONEX) 50 MCG/ACT spray, Spray 2 sprays into both nostrils daily, Disp: , Rfl:      montelukast (SINGULAIR) 10 MG tablet, Take 1 tablet (10 mg) by mouth At Bedtime, Disp: 30 tablet, Rfl: 1     Multiple Vitamin (DAILY-GERALD) TABS, Take 1 tablet by mouth daily, Disp: , Rfl: 0     nitroGLYcerin (NITROSTAT) 0.4 MG sublingual tablet, Place 1 tablet (0.4 mg) under the tongue every 5 minutes as needed for chest pain, Disp: 25 tablet, Rfl: 1     nystatin (MYCOSTATIN) 819281 UNITS TABS tablet, TK 2 TS PO BID, Disp: , Rfl: 0     olopatadine (PATANOL) 0.1 % ophthalmic solution, Place 1 drop into both eyes 2 times daily as needed for allergies., Disp: 5 mL, Rfl: 12     Ondansetron HCl (ZOFRAN PO), , Disp: , Rfl:      oxyCODONE IR (ROXICODONE) 5 MG tablet, Take 1 tablet (5 mg) by mouth every 6 hours as needed for pain, Disp: 5 tablet, Rfl: 0     pravastatin (PRAVACHOL) 40 MG tablet, Take 1 tablet (40 mg) by mouth daily, Disp: 30 tablet, Rfl: 11     ranitidine (ZANTAC) 150 MG tablet, Take 1 tablet (150 mg) by mouth 2 times daily, Disp: 180 tablet, Rfl: 1     spironolactone (ALDACTONE) 50 MG tablet, Take 1 tablet (50 mg) by mouth daily . Take additional 0.5 tablets by mouth once daily as needed for weight gain., Disp: 45 tablet, Rfl: 11     sucralfate (CARAFATE) 1 GM tablet, Take 1 tablet (1 g) by mouth 4 times daily as needed, Disp: 120 tablet, Rfl: 3     SYMBICORT 80-4.5 MCG/ACT Inhaler, INHALE 2 PUFFS PO BID RINSE AND EXPECTORATE AFTER, Disp: ,  Rfl: 3     traMADol (ULTRAM) 50 MG tablet, Take 1 tablet (50 mg) by mouth every 8 hours as needed for moderate pain, Disp: 60 tablet, Rfl: 3     vitamin D (ERGOCALCIFEROL) 09901 UNIT capsule, Take 1 capsule (50,000 Units) by mouth every 7 days Need a Vitamin D level in 2 months. (Patient taking differently: Take 50,000 Units by mouth every 7 days Need a Vitamin D level in 2 months.), Disp: 8 capsule, Rfl: 0      Allergies:   Amoxicillin-pot clavulanate; Augmentin; Azithromycin; Diatrizoate; Imitrex [sumatriptan]; Penicillins; Pork allergy; Shellfish allergy; Sulfa drugs; and Zithromax [azithromycin dihydrate]      Past Medical History:  Abnormal glandular papanicolaou smear of cervix  Allergic rhinitis  Arthritis   Arteriosclerotic cardiovascular disease  Chronic pain  Chronic pancreatitis  Common migraine  Coronary artery disease  Costochondritis  Difficulty walking  Dyspnea upon exertion  Ectasia, mammary duct  Gastroesophageal reflux disease  Hernia  Angina  Hyperlipidemia   Hypertension  Iron deficiency anemia  Ischemic cardiomyopathy  Menorrhagia  Neuritis optic  Non-ST elevated myocardial infarction   Stented coronary artery  Seasonal affective disorder  Type 2 diabetes  Cerebral artery occulusion with cerebral infraction  Uterine leiomyoma  Vasculitis retinal   Ventral hernia  Polycystic ovarian syndrome  Hypertensive heart disease  Vitamin D deficiency  Spinal stenosis in cervical region  Fibromyalgia  seronegative rheumatoid arthritis  Overweight  Tobacco use disorder  Telogen effluvium  Percutaneous coronary angioplasty       Past Surgical History:  Echo heart xthoracic complete, w/o doppler  C section, low transverse  Cardiac stent  Dilation and curettage  UGI endoscopy w. EUS  Hernia repair  Insert intrauterine device  INT uterine fibroid embolization  Laparoscopic cholecystectomy  LEEP, cervical  Orbitotomy x2  Repair ptosis  Upper GI endoscopy     Family History:   Heart disease - father, mother,  brother  Cerebrovascular disease - father, maternal uncle  Diabetes - father, maternal uncle  Hypertension - father, mother, maternal aunt  Depression - father  CAD - father, mother, brother  Arthritis - mother, brother, maternal uncle  Thyroid disease - mother  Eye disorder - maternal uncle   Migraines - sister, son  Asthma - son      Social History:   This patient presented alone.   Smoking status: former smoker of 1 year, 0.2 packs per day, quit 2/1/2016  Smokeless tobacco: never  Alcohol use: no     Physical Exam:   /84  Pulse 75  Temp 98.2  F (36.8  C) (Oral)  SpO2 98%   Constitutional: Alert. Mild distress from abdominal pain.   Gastrointestinal: Abdomen soft. Epigastric tenderness with no rebound or guarding. She is tender to percussion. BS normal. No masses or organomegaly.  Skin: No suspicious lesions. No rashes.  Neurologic: Gait normal. Reflexes normal and symmetric. Sensation grossly WNL.  Psychiatric: Mentation appears normal. Normal affect.   Hematologic/Lymphatic/Immunologic: Normal cervical lymph nodes.      Assessment and Plan:  Abdominal pain, epigastric  Better post GI cocktail. She will conteinue zantanc and start sucralfate. She will use Zofran PRN. We will do an endoscopy once I have hear back from her cardiologist as to if she can stop her Aspirin and Plavix. Pateint will see me in a week to check up on her abdominal pain, and will have diabetes labs done then.   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 Her [place] [facility] note said they would like to see her back to consider immune therapies. However per patient it is hard to get there. At next visit  we will dscuss transfering care to here, as I did not receive this note until after the visit. I communicated this information also to her rheumatologist here via EPIC task.      Follow-up: PRN.         Scribe Disclosure:   I, Catia Bishop, am serving as a scribe to document services personally performed by Kash Solano MD at this visit, based upon the provider's statements to me. All documentation has been reviewed by the aforementioned provider prior to being entered into the official medical record.     Portions of this medical record were completed by a scribe. UPON MY REVIEW AND AUTHENTICATION BY ELECTRONIC SIGNATURE, this confirms (a) I performed the applicable clinical services, and (b) the record is accurate.   Kash Solano MD

## 2018-11-02 ENCOUNTER — TELEPHONE (OUTPATIENT)
Dept: INTERNAL MEDICINE | Facility: CLINIC | Age: 53
End: 2018-11-02

## 2018-11-02 ENCOUNTER — TELEPHONE (OUTPATIENT)
Dept: GASTROENTEROLOGY | Facility: CLINIC | Age: 53
End: 2018-11-02

## 2018-11-02 NOTE — TELEPHONE ENCOUNTER
Left a detailed message to the pt regarding this recommendation.    Soon-Mi  ------------------------------------------------------------        ----- Message from Kash Solano MD sent at 11/2/2018  7:31 AM CDT -----  Thanks!    Soon mi I saw Janine yesterday, ordered EGD. Can you let her know ok to schedule and stop aspirin and plavix both seven days before, then resume after.  ----- Message -----     From: Milan Peace MD     Sent: 11/1/2018   7:04 PM       To: MD Jennifer Dobbins,    Stop plavix and aspirin 7 days before endoscopy    Thanks    RAUL  ----- Message -----     From: Kash Solano MD     Sent: 11/1/2018   2:41 PM       To: MD Milan Colbert,   She has been on aspirin and Plavix for a few years for stents--is it ok to stop them both for a week for endoscopy for possible ulcer?  Thanks  Horace

## 2018-11-05 ENCOUNTER — TELEPHONE (OUTPATIENT)
Dept: GASTROENTEROLOGY | Facility: CLINIC | Age: 53
End: 2018-11-05

## 2018-11-06 ENCOUNTER — TELEPHONE (OUTPATIENT)
Dept: GASTROENTEROLOGY | Facility: CLINIC | Age: 53
End: 2018-11-06

## 2018-11-07 ENCOUNTER — TELEPHONE (OUTPATIENT)
Dept: CARDIOLOGY | Facility: CLINIC | Age: 53
End: 2018-11-07

## 2018-11-07 NOTE — TELEPHONE ENCOUNTER
Date: 11/7/2018    Time of Call: 8:56 AM     Diagnosis:  CAD     [ TORB ] Ordering provider: Dr Milan Peace  Order:   - Hold Clopidogrel for 7 days prior to Endoscopy.  - Hold ASA for 7 days prior to Endoscopy.     Order received by: Selena Underwood LPN       Follow-up/additional notes: Per Dr Peace staff message below.  At the request of PCP.  MyChart sent.      From: Milan Peace MD      Sent: 11/1/2018   7:04 PM        To: Kash Solano MD     Jennifer,     Stop plavix and aspirin 7 days before endoscopy     Thanks     DD

## 2018-11-07 NOTE — TELEPHONE ENCOUNTER
----- Message from Milan Peace MD sent at 11/2/2018 10:42 AM CDT -----  Thanks    DD  ----- Message -----     From: Kash Solano MD     Sent: 11/2/2018   7:31 AM       To: Milan Peace MD, Soon-Ramila Ferrera RN    Thanks!    Soon mi I saw Janine yesterday, ordered EGD. Can you let her know ok to schedule and stop aspirin and plavix both seven days before, then resume after.  ----- Message -----     From: Milan Peace MD     Sent: 11/1/2018   7:04 PM       To: MD Jennifer Dobbins,    Stop plavix and aspirin 7 days before endoscopy    Thanks    DD  ----- Message -----     From: Kash Solano MD     Sent: 11/1/2018   2:41 PM       To: MD Milan Colbert,   She has been on aspirin and Plavix for a few years for stents--is it ok to stop them both for a week for endoscopy for possible ulcer?  Thanks  Horace

## 2018-11-09 ENCOUNTER — OFFICE VISIT (OUTPATIENT)
Dept: FAMILY MEDICINE | Facility: CLINIC | Age: 53
End: 2018-11-09
Payer: COMMERCIAL

## 2018-11-09 VITALS — HEART RATE: 98 BPM | SYSTOLIC BLOOD PRESSURE: 108 MMHG | DIASTOLIC BLOOD PRESSURE: 74 MMHG

## 2018-11-09 DIAGNOSIS — E11.9 DM TYPE 2, GOAL HBA1C 7%-8% (H): ICD-10-CM

## 2018-11-09 DIAGNOSIS — R10.13 ABDOMINAL PAIN, EPIGASTRIC: Primary | ICD-10-CM

## 2018-11-09 LAB — HBA1C MFR BLD: 9.6 % (ref 0–5.6)

## 2018-11-09 ASSESSMENT — PAIN SCALES - GENERAL: PAINLEVEL: SEVERE PAIN (7)

## 2018-11-09 NOTE — NURSING NOTE
Chief Complaint   Patient presents with     Abdominal Pain     pt states she is in a lot of stomach pain       Maranda Acosta CMA at 3:42 PM on 11/9/2018.

## 2018-11-09 NOTE — MR AVS SNAPSHOT
After Visit Summary   11/9/2018    Janine Cornell    MRN: [id number]           Patient Information     Date Of Birth          1965        Visit Information        Provider Department      11/9/2018 3:20 PM Kash Solano MD [facility]        Today's Diagnoses     Abdominal pain, epigastric    -  1    DM type 2, goal HbA1c 7%-8% (H)          Care Instructions    Primary Care Center Medication Refill Request Information:  * Please contact your pharmacy regarding ANY request for medication refills.  ** [facility] Prescription Fax = [phone]  * Please allow 3 business days for routine medication refills.  * Please allow 5 business days for controlled substance medication refills.     Primary Care Center Test Result notification information:  *You will be notified with in 7-10 days of your appointment day regarding the results of your test.  If you are on MyChart you will be notified as soon as the provider has reviewed the results and signed off on them.    [facility]: [phone]             Follow-ups after your visit        Your next 10 appointments already scheduled     Jan 11, 2019  3:30 PM CST   (Arrive by 3:15 PM)   Return Visit with Majo Mckinnon MD   [facility] ([facility])    [address] [place] [address]  [address]  [facility] [address] [phone]              Who to contact     Please call your clinic at [phone] to:    Ask questions about your health    Make or cancel appointments    Discuss your medicines    Learn about your test results    Speak to your doctor            Additional Information About Your Visit        MyChart Information     [employer] gives you secure access to your electronic health record. If you see a primary care provider, you can also send messages to your care team and make appointments. If you have questions, please call your primary care clinic.  If you do not have a primary care provider,  please call [phone] and they will assist you.      [employer] App is an electronic gateway that provides easy, online access to your medical records. With [employer] App, you can request a clinic appointment, read your test results, renew a prescription or communicate with your care team.     To access your existing account, please contact your [facility] or call [phone] for assistance.        Care EveryWhere ID     This is your Care EveryWhere ID. This could be used by other organizations to access your [facility] medical records  [id number]        Your Vitals Were     Pulse                   98            Blood Pressure from Last 3 Encounters:   11/09/18 108/74   11/01/18 117/84   10/30/18 138/83    Weight from Last 3 Encounters:   10/30/18 71.4 kg (157 lb 6 oz)   09/07/18 71.3 kg (157 lb 3.2 oz)   07/25/18 74.4 kg (164 lb)                 Today's Medication Changes          These changes are accurate as of 11/9/18 11:59 PM.  If you have any questions, ask your nurse or doctor.               These medicines have changed or have updated prescriptions.        Dose/Directions    * lidocaine (viscous), alum & mag hydroxide-simethicone GI Cocktail   This may have changed:  Another medication with the same name was added. Make sure you understand how and when to take each.   Used for:  Abdominal pain, epigastric   Changed by:  Kash Solano MD        30 ml times on po now   Quantity:  30 mL   Refills:  0       * lidocaine (viscous) 15 mL, alum & mag hydroxide-simethicone 15 mL GI Cocktail   This may have changed:  You were already taking a medication with the same name, and this prescription was added. Make sure you understand how and when to take each.   Used for:  Abdominal pain, epigastric   Changed by:  Kash Solano MD        Dose:  30 mL   Take 30 mLs by mouth once for 1 dose   Quantity:  30 mL   Refills:  0       vitamin D2 88426 UNIT capsule   Commonly known as:   ERGOCALCIFEROL   This may have changed:  additional instructions   Used for:  Vitamin D deficiency        Dose:  64347 Units   Take 1 capsule (50,000 Units) by mouth every 7 days Need a Vitamin D level in 2 months.   Quantity:  8 capsule   Refills:  0       * Notice:  This list has 2 medication(s) that are the same as other medications prescribed for you. Read the directions carefully, and ask your doctor or other care provider to review them with you.         Where to get your medicines      Some of these will need a paper prescription and others can be bought over the counter.  Ask your nurse if you have questions.     Bring a paper prescription for each of these medications     lidocaine (viscous) 15 mL, alum & mag hydroxide-simethicone 15 mL GI Cocktail                Primary Care Provider Office Phone # Fax #    Kash Solano [phone]       [address] [address]        Equal Access to Services     MANDA QUINN : Hadii frederick wallace hadasho Sojunaid, waaxda luqadaha, qaybta kaalmada adeegyada, gagandeep glasgow haykei phillip . So [facility] [phone].    ATENCIÓN: Si habla español, tiene a burch disposición servicios gratuitos de asistencia lingüística. Zyaame al [phone].    We comply with applicable federal civil rights laws and [place] laws. We do not discriminate on the basis of race, color, national origin, age, disability, sex, sexual orientation, or gender identity.            Thank you!     Thank you for choosing [facility]  for your care. Our goal is always to provide you with excellent care. Hearing back from our patients is one way we can continue to improve our services. Please take a few minutes to complete the written survey that you may receive in the mail after your visit with us. Thank you!             Your Updated Medication List - Protect others around you: Learn how to safely use, store and throw away your medicines at  www.disposemymeds.org.          This list is accurate as of 11/9/18 11:59 PM.  Always use your most recent med list.                   Brand Name Dispense Instructions for use Diagnosis    acetaminophen 325 MG tablet    TYLENOL    250 tablet    Take 1-2 tablets (325-650 mg) by mouth every 6 hours as needed for pain (headache)    Other chronic pain, Headache(784.0)       AEROSPAN 80 MCG/ACT Aers oral inhaler   Generic drug:  flunisolide HFA      INHALE 2 PUFFS PO BID.  RINSE MOUTH AFTERWARDS        * albuterol 108 (90 Base) MCG/ACT inhaler    PROAIR HFA/PROVENTIL HFA/VENTOLIN HFA    3 Inhaler    Inhale 2 puffs into the lungs every 6 hours as needed for shortness of breath / dyspnea or wheezing        * albuterol (2.5 MG/3ML) 0.083% neb solution      INL 1 VIAL VIA NEBULIZATION Q 4 TO 6 HOURS PRN        ASPIRIN LOW DOSE 81 MG EC tablet   Generic drug:  aspirin     90 tablet    Take 1 tablet (81 mg) by mouth daily    Hyperlipidemia LDL goal <70, HTN (hypertension)       azelastine 0.1 % nasal spray    ASTELIN     U 2 SPRAYS IEN BID        bacitracin ophthalmic ointment     3.5 g    Apply to incision line three times daily.    Postoperative eye state       * insulin glargine 100 UNIT/ML injection    LANTUS     Inject 40 Units Subcutaneous daily (with breakfast)        * BASAGLAR 100 UNIT/ML injection     12 mL    Inject 40 Units Subcutaneous daily    Type 2 diabetes mellitus without complication (H)       blood glucose monitoring lancets     4 Box    Use to test blood sugars 4 times daily or as directed.    Type 2 diabetes, HbA1c goal < 7% (H)       blood glucose monitoring meter device kit    no brand specified    1 kit    Use to test blood sugar 4 X times daily or as directed.    Type 2 diabetes, HbA1c goal < 7% (H)       blood glucose monitoring test strip    no brand specified    360 each    1 strip by In Vitro route 4 times daily Test as directed. Please dispense three months and three months of refills. Thank you.     Type 2 diabetes, HbA1c goal < 7% (H)       clopidogrel 75 MG tablet    PLAVIX    30 tablet    Take 1 tablet (75 mg) by mouth daily        * COMPRESSION STOCKINGS     4 each    2 each daily    Bilateral lower extremity edema, Venous incompetence       * COMPRESSION STOCKINGS     4 each    2 each daily Apply one 10-15 mmHg compression stocking to each lower extgmierty during the day and remove before bedtime.    Venous incompetence, Bilateral lower extremity edema       cyclobenzaprine 10 MG tablet    FLEXERIL    180 tablet    Take 1 tablet (10 mg) by mouth 2 times daily as needed for muscle spasms    Fibromyalgia       DAILY-GERALD Tabs      Take 1 tablet by mouth daily        desonide 0.05 % cream    DESOWEN     Apply topically 2 times daily        dicyclomine 20 MG tablet    BENTYL    60 tablet    TAKE 1 TABLET(20 MG) BY MOUTH FOUR TIMES DAILY AS NEEDED    Other irritable bowel syndrome       diphenhydrAMINE 25 MG capsule    BENADRYL     TK 1 TO 2 CS PO QHS        EPIPEN 2-RIKY 0.3 MG/0.3ML injection 2-pack   Generic drug:  EPINEPHrine      INJECT 0.3 MG INTO THE MUSCLE PRF ANAPHYALAXIS        ferrous gluconate 324 (38 Fe) MG tablet    FERGON    180 tablet    Take 1 tablet (324 mg) by mouth 2 times daily    AILEEN (iron deficiency anemia)       fexofenadine 180 MG tablet    ALLEGRA    90 tablet    Take 1 tablet by mouth daily as needed.    Chronic rhinitis       fluocinolone 0.01 % solution    SYNALAR     Apply topically daily as needed        fluocinonide 0.05 % solution    LIDEX    60 mL    Apply topically 2 times daily To areas of itch on the scalp as needed.    Telogen effluvium       folic acid 1 MG tablet    FOLVITE    90 tablet    Take 1 tablet by mouth daily    Inflammatory arthritis       hydroxychloroquine 200 MG tablet    PLAQUENIL    180 tablet    Take 2 tablets (400 mg) by mouth daily    Inflammatory arthritis       insulin pen needle 32G X 4 MM    BD MARCK U/F    300 each    USE DAILY OR AS DIRECTED     Type 2 diabetes, HbA1c goal < 7% (H)       * ketoconazole 2 % shampoo    NIZORAL     Apply topically daily as needed        * ketoconazole 2 % cream    NIZORAL     Apply topically as needed        * lidocaine (viscous), alum & mag hydroxide-simethicone GI Cocktail     30 mL    30 ml times on po now    Abdominal pain, epigastric       * lidocaine (viscous) 15 mL, alum & mag hydroxide-simethicone 15 mL GI Cocktail     30 mL    Take 30 mLs by mouth once for 1 dose    Abdominal pain, epigastric       lidocaine 5 % ointment    XYLOCAINE    50 g    Apply 1 g topically 2 times daily as needed for moderate pain    Fibromyalgia, Rheumatoid arthritis involving both wrists with positive rheumatoid factor (H)       lisinopril-hydrochlorothiazide 20-25 MG per tablet    PRINZIDE/ZESTORETIC    30 tablet    Take 1 tablet by mouth daily    Hypertension, unspecified type       Magnesium Oxide -Mg Supplement 250 MG tablet      TK 1 T PO BID. REDUCE IF STOOLS LOOSEN        metFORMIN 500 MG 24 hr tablet    GLUCOPHAGE-XR    180 tablet    TAKE 2 TABLETS(1000 MG) BY MOUTH DAILY WITH DINNER    Type 2 diabetes mellitus not at goal (H)       metoclopramide 10 MG tablet    REGLAN    90 tablet    Take 1 tablet (10 mg) by mouth 4 times daily (before meals and nightly)        metoprolol succinate 100 MG 24 hr tablet    TOPROL XL    30 tablet    Take 1 tablet (100 mg) by mouth daily    Benign essential hypertension       metroNIDAZOLE 1 % cream    NORITATE     Apply topically daily        mometasone 50 MCG/ACT spray    NASONEX     Spray 2 sprays into both nostrils daily        montelukast 10 MG tablet    SINGULAIR    30 tablet    Take 1 tablet (10 mg) by mouth At Bedtime    Uncomplicated asthma, unspecified asthma severity       nitroGLYcerin 0.4 MG sublingual tablet    NITROSTAT    25 tablet    Place 1 tablet (0.4 mg) under the tongue every 5 minutes as needed for chest pain    NSTEMI (non-ST elevated myocardial infarction) (H)       nystatin  790034 units Tabs tablet    MYCOSTATIN     TK 2 TS PO BID        olopatadine 0.1 % ophthalmic solution    PATANOL    5 mL    Place 1 drop into both eyes 2 times daily as needed for allergies.    Chronic rhinitis       ondansetron 8 MG ODT tab    ZOFRAN-ODT    60 tablet    Take 1 tablet (8 mg) by mouth every 8 hours as needed for nausea    Nausea       oxyCODONE IR 5 MG tablet    ROXICODONE    5 tablet    Take 1 tablet (5 mg) by mouth every 6 hours as needed for pain        pravastatin 40 MG tablet    PRAVACHOL    30 tablet    Take 1 tablet (40 mg) by mouth daily    Coronary artery disease involving native heart without angina pectoris, unspecified vessel or lesion type       ranitidine 150 MG tablet    ZANTAC    180 tablet    Take 1 tablet (150 mg) by mouth 2 times daily    Gastritis       spironolactone 50 MG tablet    ALDACTONE    45 tablet    Take 1 tablet (50 mg) by mouth daily . Take additional 0.5 tablets by mouth once daily as needed for weight gain.    Hypertension goal BP (blood pressure) < 140/90       * sucralfate 1 GM tablet    CARAFATE    120 tablet    Take 1 tablet (1 g) by mouth 4 times daily as needed    Abdominal pain, epigastric       * sucralfate 1 GM tablet    CARAFATE    120 tablet    Take 1 tablet (1 g) by mouth 4 times daily    Abdominal pain, epigastric       SYMBICORT 80-4.5 MCG/ACT Inhaler   Generic drug:  budesonide-formoterol      INHALE 2 PUFFS PO BID RINSE AND EXPECTORATE AFTER        traMADol 50 MG tablet    ULTRAM    60 tablet    Take 1 tablet (50 mg) by mouth every 8 hours as needed for moderate pain    Undifferentiated connective tissue disease (H)       TUMS 500 MG chewable tablet   Generic drug:  calcium carbonate      Take 3-4 chew tab by mouth daily as needed.        vitamin D2 88415 UNIT capsule    ERGOCALCIFEROL    8 capsule    Take 1 capsule (50,000 Units) by mouth every 7 days Need a Vitamin D level in 2 months.    Vitamin D deficiency       ZOFRAN PO           * Notice:   This list has 12 medication(s) that are the same as other medications prescribed for you. Read the directions carefully, and ask your doctor or other care provider to review them with you.

## 2018-11-09 NOTE — PROGRESS NOTES
[facility]  Primary Care Center   Kash Solano MD  11/09/2018      Chief Complaint: Abdominal Pain     History of Present Illness:   Janine Cornell is a 53 year old female with a history of coronary artery disease with stent, chronic pain, GERD, hyperlipidemia, hypertension, anemia, myocardial infarction, type 2 diabetes, and cerebral infarction who presents for evaluation of abdominal pain. The patient was last seen in clinic on 11/1 after an ED visit two days prior with abdominal pain, vomiting, and diarrhea. She reported that her pain had continued since leaving the hospital. She had finished the prescriptions of Reglan and Oxycodone that she was given in the ED and they did not help much. She was taking Zantac and Tums with minimal relief. She has had abdominal pain of this nature in the past that was managed with acupuncture. She was started on Sucralfate and given Zofran prn. The plan was to do an endoscopy as soon as I heard back from her cardiologist regarding whether she can stop Aspirin and Plavix. Today she expresses frustration that she cannot get an EGD for 6 weeks. She reports that pain medication is not helping, and she is still applying heat and ice to her abdomen almost daily. She has been off of Aspirin and Plavix for 1 week thinking that the EGD was going to happen more quickly. She was told by her cardiologist that she cannot take Omeprazole. She would rather do a Barium study then the EGD.    Other concerns discussed:  The patient has recently been seen by [facility] regarding her right periorbital vesicle vasculitis. At last visit she expressed trouble getting transportation to her appointments at [place]. Plan is to discuss transferring care to rheumatology here. Today the patient reports that she is in a lot of pain and does not believe that it is okay to live in pain. She does not believe that her eye should look the way it does after having surgery. She has been dealing with this for 3  years and does not want to be juggled around by doctors. She waits weeks or a month to hear answers and is very frustrated. She would like to see a specialist in [place]. She endorses facial redness, numbness, and pain. She states that other people would run to the ED every time they were in the pain she is, and it is not fair that she just has to deal with it. She is weary of starting new medication due to side effects. She believes that her A1c has increased probably about 8 due to being on Prednisone for several weeks on end. She reports that it was last checked in September at an outside clinic and it was 8.1.     The patient is having trouble receiving support at home. She reports that the people around her do not understand what she is going through and do not listen to her. Her son's father recently  and his best friend committed suicide soon thereafter. He does not understand what she is going through and has situations of his own going on. She has seen behavioral health professionals in the past, but does not trust the confidentiality of the system.      Review of Systems:   Pertinent items are noted in HPI, remainder of complete ROS is negative.      Active Medications:      acetaminophen (TYLENOL) 325 MG tablet, Take 1-2 tablets (325-650 mg) by mouth every 6 hours as needed for pain (headache), Disp: 250 tablet, Rfl: 0     AEROSPAN 80 MCG/ACT AERS, INHALE 2 PUFFS PO BID.  RINSE MOUTH AFTERWARDS, Disp: , Rfl: 4     albuterol (2.5 MG/3ML) 0.083% neb solution, INL 1 VIAL VIA NEBULIZATION Q 4 TO 6 HOURS PRN, Disp: , Rfl: 1     albuterol (PROAIR HFA, PROVENTIL HFA, VENTOLIN HFA) 108 (90 BASE) MCG/ACT inhaler, Inhale 2 puffs into the lungs every 6 hours as needed for shortness of breath / dyspnea or wheezing, Disp: 3 Inhaler, Rfl: 1     ASPIRIN LOW DOSE 81 MG EC tablet, Take 1 tablet (81 mg) by mouth daily, Disp: 90 tablet, Rfl: 3     azelastine (ASTELIN) 0.1 % spray, U 2 SPRAYS IEN BID, Disp: , Rfl:  0     bacitracin ophthalmic ointment, Apply to incision line three times daily., Disp: 3.5 g, Rfl: 0     BASAGLAR 100 UNIT/ML injection, Inject 40 Units Subcutaneous daily, Disp: 12 mL, Rfl: 2     calcium carbonate (TUMS) 500 MG chewable tablet, Take 3-4 chew tab by mouth daily as needed., Disp: , Rfl:      clopidogrel (PLAVIX) 75 MG tablet, Take 1 tablet (75 mg) by mouth daily, Disp: 30 tablet, Rfl: 11     cyclobenzaprine (FLEXERIL) 10 MG tablet, Take 1 tablet (10 mg) by mouth 2 times daily as needed for muscle spasms, Disp: 180 tablet, Rfl: 0     desonide (DESOWEN) 0.05 % cream, Apply topically 2 times daily, Disp: , Rfl:      dicyclomine (BENTYL) 20 MG tablet, TAKE 1 TABLET(20 MG) BY MOUTH FOUR TIMES DAILY AS NEEDED, Disp: 60 tablet, Rfl: 3     diphenhydrAMINE (BENADRYL) 25 MG capsule, TK 1 TO 2 CS PO QHS, Disp: , Rfl: 4     EPIPEN 2-RIKY 0.3 MG/0.3ML injection, INJECT 0.3 MG INTO THE MUSCLE PRF ANAPHYALAXIS, Disp: , Rfl: 0     ferrous gluconate (FERGON) 324 (38 Fe) MG tablet, Take 1 tablet (324 mg) by mouth 2 times daily, Disp: 180 tablet, Rfl: 1     fexofenadine (ALLEGRA) 180 MG tablet, Take 1 tablet by mouth daily as needed., Disp: 90 tablet, Rfl: 3     fluocinolone (SYNALAR) 0.01 % solution, Apply topically daily as needed, Disp: , Rfl:      fluocinonide (LIDEX) 0.05 % external solution, Apply topically 2 times daily To areas of itch on the scalp as needed., Disp: 60 mL, Rfl: 11     folic acid (FOLVITE) 1 MG tablet, Take 1 tablet by mouth daily, Disp: 90 tablet, Rfl: 3     hydroxychloroquine (PLAQUENIL) 200 MG tablet, Take 2 tablets (400 mg) by mouth daily, Disp: 180 tablet, Rfl: 0     insulin glairin (MANDO TUS) 100 UNIT/ML injection, Inject 40 Units Subcutaneous daily (with breakfast), Disp: , Rfl:      ketoconazole (NIZORAL) 2 % cream, Apply topically as needed , Disp: , Rfl: 3     ketoconazole (NIZORAL) 2 % shampoo, Apply topically daily as needed, Disp: , Rfl:      lidocaine (viscous), alum & mag  hydroxide-simethicone GI Cocktail, 30 ml times on po now, Disp: 30 mL, Rfl: 0     lidocaine (XYLOCAINE) 5 % ointment, Apply 1 g topically 2 times daily as needed for moderate pain, Disp: 50 g, Rfl: 5     lisinopril-hydrochlorothiazide (PRINZIDE/ZESTORETIC) 20-25 MG per tablet, Take 1 tablet by mouth daily, Disp: 30 tablet, Rfl: 11     Magnesium Oxide -Mg Supplement 250 MG TABS, TKO 1 T PO BID. REDUCE IF STOOLS LOOSEN, Disp: , Rfl: 11     metFORMIN (GLUCOPHAGE-XR) 500 MG 24 hr tablet, TAKE 2 TABLETS(1000 MG) BY MOUTH DAILY WITH DINNER, Disp: 180 tablet, Rfl: 0     metoclopramide (REGLAN) 10 MG tablet, Take 1 tablet (10 mg) by mouth 4 times daily (before meals and nightly), Disp: 90 tablet, Rfl: 0     metoprolol succinate (TOPROL-XL) 100 MG 24 hr tablet, Take 1 tablet (100 mg) by mouth daily, Disp: 30 tablet, Rfl: 11     metronidazole (NOR ITATE) 1 % cream, Apply topically daily, Disp: , Rfl:      mometasone (NA SONEX) 50 MCG/ACT spray, Spray 2 sprays into both nostrils daily, Disp: , Rfl:      montelukast (SINGULAIR) 10 MG tablet, Take 1 tablet (10 mg) by mouth At Bedtime, Disp: 30 tablet, Rfl: 1     Multiple Vitamin (DAILY-GERALD) TABS, Take 1 tablet by mouth daily, Disp: , Rfl: 0     nitroGLYcerin (NITROSTAT) 0.4 MG sublingual tablet, Place 1 tablet (0.4 mg) under the tongue every 5 minutes as needed for chest pain, Disp: 25 tablet, Rfl: 1     nystatin (MYCOSTATIN) 296644 UNITS TABS tablet, TKO 2 TS PO BID, Disp: , Rfl: 0     olopatadine (PATANOL) 0.1 % ophthalmic solution, Place 1 drop into both eyes 2 times daily as needed for allergies., Disp: 5 mL, Rfl: 12     ondansetron (ZOFRAN-ODT) 8 MG ODT tab, Take 1 tablet (8 mg) by mouth every 8 hours as needed for nausea, Disp: 60 tablet, Rfl: 1     oxyCODONE IR (ROXICODONE) 5 MG tablet, Take 1 tablet (5 mg) by mouth every 6 hours as needed for pain, Disp: 5 tablet, Rfl: 0     pravastatin (PRAVACHOL) 40 MG tablet, Take 1 tablet (40 mg) by mouth daily, Disp: 30 tablet,  Rfl: 11     ranitidine (ZANTAC) 150 MG tablet, Take 1 tablet (150 mg) by mouth 2 times daily, Disp: 180 tablet, Rfl: 1     spironolactone (ALDACTONE) 50 MG tablet, Take 1 tablet (50 mg) by mouth daily . Take additional 0.5 tablets by mouth once daily as needed for weight gain., Disp: 45 tablet, Rfl: 11     sucralfate (CARAFATE) 1 GM tablet, Take 1 tablet (1 g) by mouth 4 times daily, Disp: 120 tablet, Rfl: 3     SYMBICORT 80-4.5 MCG/ACT Inhaler, INHALE 2 PUFFS PO BID RINSE AND EXPECTORATE AFTER, Disp: , Rfl: 3     traMADol (ULTRAM) 50 MG tablet, Take 1 tablet (50 mg) by mouth every 8 hours as needed for moderate pain, Disp: 60 tablet, Rfl: 3     vitamin D (ERGOCALCIFEROL) 19191 UNIT capsule, Take 1 capsule (50,000 Units) by mouth every 7 days Need a Vitamin D level in 2 months. (Patient taking differently: Take 50,000 Units by mouth every 7 days Need a Vitamin D level in 2 months.), Disp: 8 capsule, Rfl: 0      Allergies:   Amoxicillin-pot clavulanate; Augmentin; Azithromycin; Diatrizoate; Imitrex [sumatriptan]; Penicillins; Pork allergy; Shellfish allergy; Sulfa drugs; and Zithromax [azithromycin dihydrate]      Past Medical History:  Abnormal glandular papanicolaou smear of cervix  Allergic rhinitis  Arthritis   Arteriosclerotic cardiovascular disease  Chronic pain  Chronic pancreatitis  Common migraine  Coronary artery disease  Costochondritis  Difficulty walking  Dyspnea upon exertion  Ectasia, mammary duct  Gastroesophageal reflux disease  Hernia  Angina  Hyperlipidemia   Hypertension  Iron deficiency anemia  Ischemic cardiomyopathy  Menorrhagia  Neuritis optic  Non-ST elevated myocardial infarction   Stented coronary artery  Seasonal affective disorder  Type 2 diabetes  Cerebral artery occulusion with cerebral infraction  Uterine leiomyoma  Vasculitis retinal   Ventral hernia  Polycystic ovarian syndrome  Hypertensive heart disease  Vitamin D deficiency  Spinal stenosis in cervical  region  Fibromyalgia  seronegative rheumatoid arthritis  Overweight  Tobacco use disorder  Telogen effluvium  Percutaneous coronary angioplasty     Past Surgical History:  Echo heart thoracic complete, w/o doppler  C section, low transverse  Cardiac stent  Dilation and curettage  Hernia repair  Insert intrauterine device  INT uterine fibroid embolization  Laparoscopic cholecystectomy  LEEP, cervical  Right orbitotomy x2  Ptosis repair     Family History:    Heart disease - father, mother, brother  Cerebrovascular disease - father, maternal uncle  Diabetes - father, maternal uncle  Hypertension - father, mother, maternal aunt  Depression - father  CAD - father, mother, brother  Arthritis - mother, brother, maternal uncle  Thyroid disease - mother  Eye disorder - maternal uncle   Migraines - sister, son  Asthma - son      Social History:   Presents to clinic alone.  Tobacco Use: Former smoker of 1 year, quit 2/1/2016.  Alcohol Use: No alcohol use.      Physical Exam:   /74  Pulse 98   Constitutional: Alert. In no distress.  Head: Normocephalic.   ENT: Mucous membranes are moist.   Respiratory: Normal rate and effort.  Musculoskeletal: No gross deformities noted.   Psychiatric: Mentation appears normal. Flat affect. Tearful when describing stressors.     Assessment and Plan:  Abdominal pain, epigastric - She requests one more GI cocktail as her Zantac and Sucralfate are not helping her. I suggested we try Prilosec she remembers being told being told by her cardiologist she should not take this. I have sent a message to cardiology and pharmacy to see if there is a contraindication. If not, we will start that. She is hesitant to do an intervention such as EGD and would like to discuss other options. I explained it is a superior test to a radiology test. We could start with an upper GI. If we do that I would ask for a small bowel follow through as well to be thorough. Given that GI cocktail seems to relieve pain  temporarily, I do believe the cause of the pain is in her stomach. Upper GI ordered was stopped by a computer warning that the contrast could interact with her shellfish allergy. I've asked my RN to work with radiology to see if there is an alternate contrast. GI sympotms unchanged since last week.  - lidocaine (viscous) 15 mL, alum & mag hydroxide-simethicone 15 mL GI Cocktail  Dispense: 30 mL; Refill: 0    DM type 2, goal HbA1c 7%-8% (H)  - TSH with free T4 reflex  - Hemoglobin A1c     Autoimmune disorder - She had a recent surgery at [place] demonstrating vasculitis in her periorbital mass. I did ask her rheumatologist about next steps and they thought started Rituximab would be reasonable. Patient is hesitant to use immune suppressing drugs. Rheum appt is in January although they are willing to work her in sooner if she'd consider starting it; Janine did tell me she'd like to figure out a way to minimize MD visits, I did tell her I myself would be unable to prescribe this med.  I will contact her [place] eye doctor to see if he would have a [facility] colleague that he would refer her to so that she does not need to go to [place] as she finds it impractical.     Stress - The patient is tearful describing the stress of her chronic health problems. We did discuss if she has any support system and she does not. The people in her life are not supportive. When I asked about having Janine see a mental health specialist she told me that she has tried that and had bad experiences and does not wish to do so.     50 minute visit over half counseling and coordination of care to discuss autoimmune disorder with periobital vasculitis, possible treatment option and finding closer eye clinic than [place]. Difficulties of having many MD's and tests. And the abdomen pain, further eval treat option of PPI and possible limitations of using that, and options for not using interventional test such as EGD as she'd like to avoid. For now she does  not wish me to try to get sooner rheum appt, and does not want to schedule f/u with me till she sees outcome of UGI we hope to get done soon.        Scribe Disclosure:   I, Lilia Payton, am serving as a scribe to document services personally performed by Kash Solano MD at this visit, based upon the provider's statements to me. All documentation has been reviewed by the aforementioned provider prior to being entered into the official medical record.     Portions of this medical record were completed by a scribe. UPON MY REVIEW AND AUTHENTICATION BY ELECTRONIC SIGNATURE, this confirms (a) I performed the applicable clinical services, and (b) the record is accurate.   Kash Solano MD

## 2018-11-09 NOTE — PATIENT INSTRUCTIONS
[facility] Medication Refill Request Information:  * Please contact your pharmacy regarding ANY request for medication refills.  ** [facility] Pres[facility] Fax = [phone]  * Please allow 3 business days for routine medication refills.  * Please allow 5 business days for controlled substance medication refills.     [facility] Test Result notification information:  *You will be notified with in 7-10 days of your appointment day regarding the results of your test.  If you are on MyChart you will be notified as soon as the provider has reviewed the results and signed off on them.    [facility]: [phone]

## 2018-11-12 ENCOUNTER — TELEPHONE (OUTPATIENT)
Dept: INTERNAL MEDICINE | Facility: CLINIC | Age: 53
End: 2018-11-12

## 2018-11-12 DIAGNOSIS — R10.10 UPPER ABDOMINAL PAIN: ICD-10-CM

## 2018-11-12 DIAGNOSIS — R10.13 ABDOMINAL PAIN, EPIGASTRIC: Primary | ICD-10-CM

## 2018-11-12 NOTE — TELEPHONE ENCOUNTER
Spoke to patient on the phone to ask if patient would be willing to switch to Protonix per provider and Cardiologist. Patient states that she would switch to medication and stop Carafate, as long as Protonix does not have Pork in it, because she is allergic to Pork. Laney Licea LPN 11/12/2018 9:03 AM

## 2018-11-12 NOTE — TELEPHONE ENCOUNTER
I'd like to start her on Protonix but first need to find out if there is any pork in it (she has pork allergy), can you let me know?  Thanks

## 2018-11-13 NOTE — TELEPHONE ENCOUNTER
"I would need to know which generic product was dispensed so I could check the [facility]-specific ingredient list. The best thing to do is order and specify \"pork-free\" in the notes to the pharmacy. If the patient would prefer for us to check, we can do that after she knows which specific, generic product was dispensed to her.     Shauna Weems Pharm.D., [facility]  Medication Therapy Management Pharmacist  Page/VM:  [phone]    "

## 2018-11-13 NOTE — TELEPHONE ENCOUNTER
Thanks! Soon mi can you call her pharmacy and ask to make sure the medicine Protonix (or generic version) they are going to give her is pork free?

## 2018-11-14 NOTE — TELEPHONE ENCOUNTER
Order placed w/ note about shellfish allergy, let her know. Let her know they might keep her for 1-2 hours post drink barium to watch barium filter through esophagus, stomach, small bowel.

## 2018-11-14 NOTE — TELEPHONE ENCOUNTER
Protonix 40 mg one po daily ok 30, one refill re: epigastric pain, yes specify pork free    Any luck seeing if radiology can get her an Upper GI contrast to work around her shellfish allergy? She wanted to do that test, not EGD, first, to minimize interventions

## 2018-11-14 NOTE — TELEPHONE ENCOUNTER
According to EPIC, Pt is allergic to diatrizoate which is a water-soluble, iodinated contrast agent, but our radiology says they only use Barium contrast. So it maybe good to put the note on the order. No shellfish ingredient contained, either.

## 2018-11-14 NOTE — TELEPHONE ENCOUNTER
"Please let me know the strength & direction. I will send the Rx with the note \"pork free\".    Soon-Mi  "

## 2018-11-15 NOTE — TELEPHONE ENCOUNTER
1. Spoke with pt and informed the XR upper GI SBFT related message. She will be contacted for the scheduling (pt wants to go to [place] for this).    2. Also she declined to see pharm D for blood sugar control, but she will work on diet/excercise to lower BG. She also states that she was taking prednisone which made her A1C elevated. She will recheck A1C in 3 months.    3. I will keep in touch with the pt regarding eye doctor.    Soon-Mi  --------------------------------------------------------------------        ----- Message from Kash Solano MD sent at 11/9/2018  5:58 PM CST -----  Her hgba1c just came back. Please call her next week re: sugars are higher, double check what she is on for insulin and metformin, and let me know, we will need to improve sugar levels. If she is not working with Shauna in pharmacy we could offer that.    She sees an eye MD at [place], Dr Shah, dept phone .     Janine told me it is not practical for her to go back to [place].     Can you call his office to see if he'd have anyone in ophthalmology in the [facility] he'd think would be good MD for her to see?

## 2018-11-20 ENCOUNTER — TELEPHONE (OUTPATIENT)
Dept: INTERNAL MEDICINE | Facility: CLINIC | Age: 53
End: 2018-11-20

## 2018-11-20 DIAGNOSIS — R10.13 EPIGASTRIC PAIN: Primary | ICD-10-CM

## 2018-11-20 NOTE — TELEPHONE ENCOUNTER
[facility] Call Center    Phone Message    May a detailed message be left on voicemail: yes    Reason for Call: Other: Pt is irate that her issue has not been correctly addressed in 2 months.  Most recent problem involves communication breakdown between Pt / Dr / staff.  Dr wanted Pt to have X-Ray and then surgery, Pt refused and agreed to endoscopic exam instead.  Our staff contacted Pt to notify her her X-Ray is setup.  Pt would like to get correct procedure scheduled and is frustrated by miscommunucation.  Pt requests clinic coordinator call her to discuss issues right away.  Pt chooses not to call Pt relations at this time.  Please call Pt back to discuss how to move forward from here.     Action Taken: Message routed to:  [facility] ([facility] primary care

## 2018-11-26 ENCOUNTER — TELEPHONE (OUTPATIENT)
Dept: RHEUMATOLOGY | Facility: CLINIC | Age: 53
End: 2018-11-26

## 2018-11-26 DIAGNOSIS — M31.30 WEGENER'S VASCULITIS: Primary | ICD-10-CM

## 2018-11-26 DIAGNOSIS — I77.82 ANCA-ASSOCIATED VASCULITIS (H): ICD-10-CM

## 2018-11-26 NOTE — TELEPHONE ENCOUNTER
Pt called into clinic today wondering if we had heard from Dr. Hitchcock regarding infusions.  Pt is saying that she is continuing to have lots of swelling, matting, and trouble seeing, she states that she is having blurry visions and episodes of blind spots.  She has what looks like blisters on the upper and lower eyelid, and the eye itself is pushing forward again.  Pt is also having very thick drainage from eye.      She is wondering if she can have Rituximab?  She would like to know when it could be started and what needs to be done.      Pt is wondering what can be done to help her in the meantime with the eye issue?  Pt is not currently any prednisone.    Will send to provider for review.    Desiree Godinez RN  Rheumatology Clinic

## 2018-11-27 PROBLEM — I77.82 ANCA-ASSOCIATED VASCULITIS (H): Status: ACTIVE | Noted: 2018-11-27

## 2018-11-27 NOTE — TELEPHONE ENCOUNTER
Pt returned call, she would like to start the rituximab if possible, as I have let her know what Dr. Mckinnon said.  Offered to get her in the ophthalmology clinic, but she states that she has an ophthalmologist that she has been seeing for over 20 years.  She will connect with opthalmology for immediate management of the eye.  Have advised pt to let them know her symptoms when she calls for an appt.      She would like to get her infusions done at [place].  Will start PA process when orders are signed.    Desiree Godinez RN  Rheumatology Clinic

## 2018-11-27 NOTE — TELEPHONE ENCOUNTER
This is due to untreated Wegener's as unfortunately Janine refused rituximab in the past. I discussed rituximab risks and benefits with her many times in the past, ID screening is done and is negative and she is ready go receive 1 gram q2wk x 2.    Meanwhile, should see ophthalmology to determine prednisone dose.

## 2018-11-27 NOTE — TELEPHONE ENCOUNTER
Spoke with pt.    1. Pt is still wondering if she could take Protonix.  Dr. Solano,   Please advise. Thanks.    2. Pt was scheduled XR upper GI SBFT on 11/20, but she wanted to reschedule it.  Pt got confused what she was told about Upper GI SBFT rescheduling. Someone told her it was upper GI endoscopy, not XR upper GI SBFT, so she canceled the whole procedure and now she is not interested in having this procedure anymore as her abdominal pain calmed down. But if her sxs come back, she would have it done. She is just upset and frustrated because of this.    3. Pt is overall frustrated because she has to go to doctor to doctor to figure out about all her sxs.    She saw allergy/asthma doctor yesterday and he recommended to [facility]. Pt has an appt tomorrow (11/28) there for follow up on eye swelling.    4. Diabetes med --- Pt is currently taking lantus 48 units in the morning and metformin 1000 mg at bed time. Her dosage should be 2000 mg, but she cannot  tolerate 2000 mg due to stomach issues, only taking 1000 mg/day. She is off of prednisone now. She will work on diet/excercise to lower BG.    5. Pt reported she had flu shot. I updated immunization list.    6. Pt questioned in general what is the best course of action to reduce complications or questioned about possible drug-drug interaction. I recommended to see pharmacy D in our clinic to discuss, but she is not interested in now and she will think about it.    7. Pt is waiting for the rituximab infusion from Rheumatology.      Soon-Mi

## 2018-11-28 DIAGNOSIS — D50.9 IDA (IRON DEFICIENCY ANEMIA): ICD-10-CM

## 2018-11-28 RX ORDER — FERROUS GLUCONATE 324(38)MG
324 TABLET ORAL 2 TIMES DAILY WITH MEALS
Qty: 180 TABLET | Refills: 3 | Status: SHIPPED | OUTPATIENT
Start: 2018-11-28 | End: 2020-02-12

## 2018-11-28 RX ORDER — PANTOPRAZOLE SODIUM 40 MG/1
40 TABLET, DELAYED RELEASE ORAL DAILY
Qty: 30 TABLET | Refills: 1 | Status: SHIPPED | OUTPATIENT
Start: 2018-11-28 | End: 2021-05-18

## 2018-11-28 NOTE — TELEPHONE ENCOUNTER
Last Clinic Visit: 11/9/2018  [facility]

## 2018-11-28 NOTE — TELEPHONE ENCOUNTER
Ok thanks, ok Protonix still 40 mg one/day (I think addressed in message earlier this month in EPIC), specify on rx pork free    If pain returns we should be able to do upper gi/sbft with radiology noting her allergies to avoid any contrast that would cause problems    I appreciate her frustrations she has many issues over a long period of time    I did just see EPIC note from our rheum clinic ok'ing she start IV infusions of rituximab here so hopefully that helps    We should recheck diabetes labs every three months to see if needs more insulin if cannot tolerate more metformin    I'd agree best thing about med concerns is pharmD consult, if would like in future can arrange

## 2018-11-28 NOTE — TELEPHONE ENCOUNTER
Rx of Protonix 40 mg/day, PORK FREE tablets was sent to the pharmacy.   Spoke with pharmacy, states that they are not 100 % sure protonix tablets are pork free, it is likely. Normally capsules contain pork gelatin, but not the tablets.    Left a detailed message to the pt regarding this matter or call me back with questions.

## 2018-11-29 RX ORDER — ACETAMINOPHEN 325 MG/1
650 TABLET ORAL ONCE
Status: CANCELLED
Start: 2018-11-29

## 2018-11-29 RX ORDER — METHYLPREDNISOLONE SODIUM SUCCINATE 125 MG/2ML
125 INJECTION, POWDER, LYOPHILIZED, FOR SOLUTION INTRAMUSCULAR; INTRAVENOUS ONCE
Status: CANCELLED | OUTPATIENT
Start: 2018-11-29

## 2018-11-29 NOTE — TELEPHONE ENCOUNTER
Signed. If she has BG monitoring at home, should check BG after rituximab as pre-med solumedrol could increase BG. Thanks

## 2018-12-07 NOTE — TELEPHONE ENCOUNTER
Called and spoke with pt, infusion has been approved.  She will call [place] to schedule infusion. We discussed length of infusion and premedications and what to watch for with the possible infusion reaction.    All questions answered.     Desiree Godinez RN  R[facility]ology [facility]

## 2018-12-12 ENCOUNTER — INFUSION THERAPY VISIT (OUTPATIENT)
Dept: INFUSION THERAPY | Facility: CLINIC | Age: 53
End: 2018-12-12
Attending: INTERNAL MEDICINE
Payer: COMMERCIAL

## 2018-12-12 VITALS
SYSTOLIC BLOOD PRESSURE: 131 MMHG | WEIGHT: 165.8 LBS | RESPIRATION RATE: 16 BRPM | DIASTOLIC BLOOD PRESSURE: 71 MMHG | TEMPERATURE: 98 F | BODY MASS INDEX: 29.37 KG/M2 | HEART RATE: 93 BPM | OXYGEN SATURATION: 98 %

## 2018-12-12 DIAGNOSIS — I77.82 ANCA-ASSOCIATED VASCULITIS (H): ICD-10-CM

## 2018-12-12 DIAGNOSIS — M31.30 WEGENER'S VASCULITIS: Primary | ICD-10-CM

## 2018-12-12 PROCEDURE — 96413 CHEMO IV INFUSION 1 HR: CPT

## 2018-12-12 PROCEDURE — 96376 TX/PRO/DX INJ SAME DRUG ADON: CPT

## 2018-12-12 PROCEDURE — 96375 TX/PRO/DX INJ NEW DRUG ADDON: CPT

## 2018-12-12 PROCEDURE — 25000128 H RX IP 250 OP 636

## 2018-12-12 PROCEDURE — 25000128 H RX IP 250 OP 636: Performed by: INTERNAL MEDICINE

## 2018-12-12 PROCEDURE — 96415 CHEMO IV INFUSION ADDL HR: CPT

## 2018-12-12 PROCEDURE — 25000132 ZZH RX MED GY IP 250 OP 250 PS 637

## 2018-12-12 RX ORDER — DIPHENHYDRAMINE HYDROCHLORIDE 50 MG/ML
INJECTION INTRAMUSCULAR; INTRAVENOUS
Status: COMPLETED
Start: 2018-12-12 | End: 2018-12-12

## 2018-12-12 RX ORDER — METHYLPREDNISOLONE SODIUM SUCCINATE 125 MG/2ML
125 INJECTION, POWDER, LYOPHILIZED, FOR SOLUTION INTRAMUSCULAR; INTRAVENOUS ONCE
Status: COMPLETED | OUTPATIENT
Start: 2018-12-12 | End: 2018-12-12

## 2018-12-12 RX ORDER — DIPHENHYDRAMINE HYDROCHLORIDE 50 MG/ML
50 INJECTION INTRAMUSCULAR; INTRAVENOUS ONCE
Status: COMPLETED | OUTPATIENT
Start: 2018-12-12 | End: 2018-12-12

## 2018-12-12 RX ORDER — DIPHENHYDRAMINE HYDROCHLORIDE 50 MG/ML
50 INJECTION INTRAMUSCULAR; INTRAVENOUS
Status: COMPLETED | OUTPATIENT
Start: 2018-12-12 | End: 2018-12-12

## 2018-12-12 RX ORDER — ACETAMINOPHEN 325 MG/1
650 TABLET ORAL ONCE
Status: COMPLETED | OUTPATIENT
Start: 2018-12-12 | End: 2018-12-12

## 2018-12-12 RX ORDER — MEPERIDINE HYDROCHLORIDE 25 MG/ML
25 INJECTION INTRAMUSCULAR; INTRAVENOUS; SUBCUTANEOUS EVERY 30 MIN PRN
Status: DISCONTINUED | OUTPATIENT
Start: 2018-12-12 | End: 2018-12-12 | Stop reason: HOSPADM

## 2018-12-12 RX ORDER — METHYLPREDNISOLONE SODIUM SUCCINATE 125 MG/2ML
125 INJECTION, POWDER, LYOPHILIZED, FOR SOLUTION INTRAMUSCULAR; INTRAVENOUS
Status: DISCONTINUED | OUTPATIENT
Start: 2018-12-12 | End: 2018-12-12 | Stop reason: HOSPADM

## 2018-12-12 RX ORDER — ALBUTEROL SULFATE 0.83 MG/ML
2.5 SOLUTION RESPIRATORY (INHALATION)
Status: DISCONTINUED | OUTPATIENT
Start: 2018-12-12 | End: 2018-12-12 | Stop reason: HOSPADM

## 2018-12-12 RX ORDER — METHYLPREDNISOLONE SODIUM SUCCINATE 125 MG/2ML
125 INJECTION, POWDER, LYOPHILIZED, FOR SOLUTION INTRAMUSCULAR; INTRAVENOUS ONCE
Status: CANCELLED | OUTPATIENT
Start: 2018-12-12

## 2018-12-12 RX ORDER — DIPHENHYDRAMINE HCL 25 MG
CAPSULE ORAL
Status: DISCONTINUED
Start: 2018-12-12 | End: 2018-12-12 | Stop reason: HOSPADM

## 2018-12-12 RX ORDER — CYCLOSPORINE 0.5 MG/ML
1 EMULSION OPHTHALMIC EVERY 12 HOURS
Status: ON HOLD | COMMUNITY
End: 2021-05-25

## 2018-12-12 RX ORDER — ACETAMINOPHEN 325 MG/1
TABLET ORAL
Status: COMPLETED
Start: 2018-12-12 | End: 2018-12-12

## 2018-12-12 RX ORDER — METHYLPREDNISOLONE SODIUM SUCCINATE 125 MG/2ML
INJECTION, POWDER, LYOPHILIZED, FOR SOLUTION INTRAMUSCULAR; INTRAVENOUS
Status: COMPLETED
Start: 2018-12-12 | End: 2018-12-12

## 2018-12-12 RX ORDER — ACETAMINOPHEN 325 MG/1
650 TABLET ORAL ONCE
Status: CANCELLED
Start: 2018-12-12

## 2018-12-12 RX ORDER — ALBUTEROL SULFATE 90 UG/1
2 AEROSOL, METERED RESPIRATORY (INHALATION)
Status: DISCONTINUED | OUTPATIENT
Start: 2018-12-12 | End: 2018-12-12 | Stop reason: HOSPADM

## 2018-12-12 RX ORDER — SODIUM CHLORIDE 9 MG/ML
INJECTION, SOLUTION INTRAVENOUS CONTINUOUS PRN
Status: DISCONTINUED | OUTPATIENT
Start: 2018-12-12 | End: 2018-12-12 | Stop reason: HOSPADM

## 2018-12-12 RX ADMIN — SODIUM CHLORIDE 250 ML: 9 INJECTION, SOLUTION INTRAVENOUS at 09:28

## 2018-12-12 RX ADMIN — DIPHENHYDRAMINE HYDROCHLORIDE 50 MG: 50 INJECTION INTRAMUSCULAR; INTRAVENOUS at 09:34

## 2018-12-12 RX ADMIN — METHYLPREDNISOLONE SODIUM SUCCINATE 125 MG: 125 INJECTION INTRAMUSCULAR; INTRAVENOUS at 09:40

## 2018-12-12 RX ADMIN — DIPHENHYDRAMINE HYDROCHLORIDE 50 MG: 50 INJECTION INTRAMUSCULAR; INTRAVENOUS at 12:48

## 2018-12-12 RX ADMIN — DIPHENHYDRAMINE HYDROCHLORIDE 50 MG: 50 INJECTION, SOLUTION INTRAMUSCULAR; INTRAVENOUS at 09:34

## 2018-12-12 RX ADMIN — HYDROCORTISONE SODIUM SUCCINATE 100 MG: 100 INJECTION, POWDER, FOR SOLUTION INTRAMUSCULAR; INTRAVENOUS at 13:10

## 2018-12-12 RX ADMIN — RITUXIMAB 1000 MG: 10 INJECTION, SOLUTION INTRAVENOUS at 10:05

## 2018-12-12 RX ADMIN — METHYLPREDNISOLONE SODIUM SUCCINATE 125 MG: 125 INJECTION, POWDER, FOR SOLUTION INTRAMUSCULAR; INTRAVENOUS at 09:40

## 2018-12-12 RX ADMIN — ACETAMINOPHEN 650 MG: 325 TABLET, FILM COATED ORAL at 09:28

## 2018-12-12 RX ADMIN — ACETAMINOPHEN 650 MG: 325 TABLET ORAL at 09:28

## 2018-12-12 ASSESSMENT — PAIN SCALES - GENERAL: PAINLEVEL: MODERATE PAIN (4)

## 2018-12-12 NOTE — PATIENT INSTRUCTIONS
EDUCATION POST BIOLOGICAL/CHEMOTHERAPY INFUSION  Call the triage nurse at your clinic or seek medical attention if you have chills and/or temperature greater than or equal to 100.5, uncontrolled nausea/vomiting, diarrhea, constipation, dizziness weakness or any other new or concerning symptoms, questions or concerns.  If you experience shortness of breath, chest pain, heart palpitations, you will need to get to an emergency department or call 911.  You can not have any live virus vaccines prior to or during treatment or up to 6 months post infusion.  If you have an upcoming surgery, medical procedure or dental procedure during treatment, this should be discussed with your ordering physician and your surgeon/dentist.  If you are having any concerning symptom, if you are unsure if you should get your next infusion or wish to speak to a provider before your next infusion, please call your care coordinator or triage nurse at your clinic to notify them so we can adequately serve you.  Dr. Chahal [facility] # [phone].

## 2018-12-12 NOTE — PROGRESS NOTES
"Infusion Nursing Note:  Janine CHELLE Kraig presents today for rituxan.    Patient seen by provider today: No   present during visit today: Not Applicable.    Note: Patient here for first dose of rituxan.  Reviewed side effects with patient.  Premeds given and rituxan started.  Titrated per orders every 30 minutes.  Following 250cc dose increase patient states \"it feels like her asthma is acting up\" also complaining of slight scratchy throat.  Rituxan stopped at this time and 50mg IV benadryl given.  Patient also took 2 puffs from her albuteral inhaler.  Dr. Chahal notified and requested that patient have hydrocortisone as well.  She also wanted to restart rituxan when patient stable.  Rituxan restarted at 150cc/hr titrated to 250cc/hr, did not attempt further increases in rate.    Intravenous Access:  Peripheral IV placed.    Treatment Conditions:  Biological Infusion Checklist:    ~~~ NOTE: If the patient answers yes to any of the questions below, hold the infusion and contact ordering provider or on-call provider.    1. Have you recently had an elevated temperature, fever, chills, productive cough, coughing for 3 weeks or longer or hemoptysis,  abnormal vital signs, night sweats,  chest pain or have you noticed a decrease in your appetite, unexplained weight loss or fatigue? No  2. Do you have any open wounds or new incisions? No  3. Do you have any recent or upcoming hospitalizations, surgeries or dental procedures? No  4. Do you currently have or recently have had any signs of illness or infection or are you on any antibiotics? No  5. Have you had any new, sudden or worsening abdominal pain? No  6. Have you or anyone in your household received a live vaccination in the past 4 weeks? Please note:  No live vaccines while on biologic/chemotherapy until 6 months after the last treatment.  Patient can receive the flu vaccine (shot only) and the pneumovax.  It is optimal for the patient to get these vaccines " mid cycle, but they can be given at any time as long as it is not on the day of the infusion. No  7. Have you recently been diagnosed with any new nervous system diseases (ie. Multiple sclerosis, Guillain [place], seizures, neurological changes) or cancer diagnosis? Are you on any form of radiation or chemotherapy? No  8. Are you pregnant or breast feeding or do you have plans of pregnancy in the future? No  9. Have you been having any signs of worsening depression or suicidal ideations?  (benlysta only) No  10. Have there been any other new onset medical symptoms? No    Post Infusion Assessment:  Patient tolerated infusion poorly due to see above note/ reaction.  Patient observed for 30 minutes post rituxan per protocol.  Blood return noted pre and post infusion.  Site patent and intact, free from redness, edema or discomfort.  No evidence of extravasations.  Access discontinued per protocol.  No further episodes or reactions.  Biologic Infusion Post Education: Call the triage nurse at your clinic or seek medical attention if you have chills and/or temperature greater than or equal to 100.5, uncontrolled nausea/vomiting, diarrhea, constipation, dizziness, shortness of breath, chest pain, heart palpitations, weakness or any other new or concerning symptoms, questions or concerns.  You cannot have any live virus vaccines prior to or during treatment or up to 6 months post infusion.  If you have an upcoming surgery, medical procedure or dental procedure during treatment, this should be discussed with your ordering physician and your surgeon/dentist.  If you are having any concerning symptom, if you are unsure if you should get your next infusion or wish to speak to a provider before your next infusion, please call your care coordinator or triage nurse at your clinic to notify them so we can adequately serve you.    Discharge Plan:   Copy of AVS reviewed with patient and/or family.  Patient will return 12/26 for next  appointment.  Patient discharged in stable condition accompanied by: self does have transportation home.  Departure Mode: Ambulatory.    Nahed Yang RN

## 2018-12-24 ENCOUNTER — TELEPHONE (OUTPATIENT)
Dept: CARDIOLOGY | Facility: CLINIC | Age: 53
End: 2018-12-24

## 2018-12-24 DIAGNOSIS — I10 BENIGN ESSENTIAL HYPERTENSION: ICD-10-CM

## 2018-12-24 DIAGNOSIS — I25.10 CORONARY ARTERY DISEASE INVOLVING NATIVE HEART WITHOUT ANGINA PECTORIS, UNSPECIFIED VESSEL OR LESION TYPE: ICD-10-CM

## 2018-12-24 DIAGNOSIS — I10 HYPERTENSION GOAL BP (BLOOD PRESSURE) < 140/90: ICD-10-CM

## 2018-12-24 DIAGNOSIS — I21.4 NSTEMI (NON-ST ELEVATED MYOCARDIAL INFARCTION) (H): Primary | ICD-10-CM

## 2018-12-24 DIAGNOSIS — I10 HYPERTENSION, UNSPECIFIED TYPE: ICD-10-CM

## 2018-12-24 RX ORDER — LISINOPRIL AND HYDROCHLOROTHIAZIDE 20; 25 MG/1; MG/1
1 TABLET ORAL DAILY
Qty: 90 TABLET | Refills: 0 | Status: SHIPPED | OUTPATIENT
Start: 2018-12-24 | End: 2018-12-28

## 2018-12-24 RX ORDER — CLOPIDOGREL BISULFATE 75 MG/1
75 TABLET ORAL DAILY
Qty: 90 TABLET | Refills: 0 | Status: SHIPPED | OUTPATIENT
Start: 2018-12-24 | End: 2018-12-28

## 2018-12-26 ENCOUNTER — INFUSION THERAPY VISIT (OUTPATIENT)
Dept: INFUSION THERAPY | Facility: CLINIC | Age: 53
End: 2018-12-26
Attending: INTERNAL MEDICINE
Payer: COMMERCIAL

## 2018-12-26 VITALS
WEIGHT: 165.1 LBS | HEART RATE: 92 BPM | SYSTOLIC BLOOD PRESSURE: 132 MMHG | OXYGEN SATURATION: 98 % | TEMPERATURE: 98.2 F | BODY MASS INDEX: 29.25 KG/M2 | RESPIRATION RATE: 18 BRPM | DIASTOLIC BLOOD PRESSURE: 69 MMHG

## 2018-12-26 DIAGNOSIS — M31.30 WEGENER'S VASCULITIS: Primary | ICD-10-CM

## 2018-12-26 DIAGNOSIS — I77.82 ANCA-ASSOCIATED VASCULITIS (H): ICD-10-CM

## 2018-12-26 PROCEDURE — 96415 CHEMO IV INFUSION ADDL HR: CPT

## 2018-12-26 PROCEDURE — 25000128 H RX IP 250 OP 636: Performed by: INTERNAL MEDICINE

## 2018-12-26 PROCEDURE — 96376 TX/PRO/DX INJ SAME DRUG ADON: CPT

## 2018-12-26 PROCEDURE — 96413 CHEMO IV INFUSION 1 HR: CPT

## 2018-12-26 PROCEDURE — 96375 TX/PRO/DX INJ NEW DRUG ADDON: CPT

## 2018-12-26 PROCEDURE — 25000132 ZZH RX MED GY IP 250 OP 250 PS 637: Performed by: INTERNAL MEDICINE

## 2018-12-26 RX ORDER — METHYLPREDNISOLONE SODIUM SUCCINATE 125 MG/2ML
125 INJECTION, POWDER, LYOPHILIZED, FOR SOLUTION INTRAMUSCULAR; INTRAVENOUS ONCE
Status: CANCELLED | OUTPATIENT
Start: 2018-12-26

## 2018-12-26 RX ORDER — ACETAMINOPHEN 325 MG/1
650 TABLET ORAL ONCE
Status: COMPLETED | OUTPATIENT
Start: 2018-12-26 | End: 2018-12-26

## 2018-12-26 RX ORDER — ALBUTEROL SULFATE 90 UG/1
2 AEROSOL, METERED RESPIRATORY (INHALATION)
Status: DISCONTINUED | OUTPATIENT
Start: 2018-12-26 | End: 2018-12-26 | Stop reason: HOSPADM

## 2018-12-26 RX ORDER — DIPHENHYDRAMINE HYDROCHLORIDE 50 MG/ML
50 INJECTION INTRAMUSCULAR; INTRAVENOUS
Status: COMPLETED | OUTPATIENT
Start: 2018-12-26 | End: 2018-12-26

## 2018-12-26 RX ORDER — SODIUM CHLORIDE 9 MG/ML
INJECTION, SOLUTION INTRAVENOUS CONTINUOUS PRN
Status: DISCONTINUED | OUTPATIENT
Start: 2018-12-26 | End: 2018-12-26 | Stop reason: HOSPADM

## 2018-12-26 RX ORDER — PRAVASTATIN SODIUM 40 MG
40 TABLET ORAL DAILY
Qty: 30 TABLET | Refills: 11 | Status: SHIPPED | OUTPATIENT
Start: 2018-12-26 | End: 2018-12-28

## 2018-12-26 RX ORDER — METHYLPREDNISOLONE SODIUM SUCCINATE 125 MG/2ML
125 INJECTION, POWDER, LYOPHILIZED, FOR SOLUTION INTRAMUSCULAR; INTRAVENOUS ONCE
Status: COMPLETED | OUTPATIENT
Start: 2018-12-26 | End: 2018-12-26

## 2018-12-26 RX ORDER — METHYLPREDNISOLONE SODIUM SUCCINATE 125 MG/2ML
125 INJECTION, POWDER, LYOPHILIZED, FOR SOLUTION INTRAMUSCULAR; INTRAVENOUS
Status: DISCONTINUED | OUTPATIENT
Start: 2018-12-26 | End: 2018-12-26 | Stop reason: HOSPADM

## 2018-12-26 RX ORDER — ALBUTEROL SULFATE 0.83 MG/ML
2.5 SOLUTION RESPIRATORY (INHALATION)
Status: DISCONTINUED | OUTPATIENT
Start: 2018-12-26 | End: 2018-12-26 | Stop reason: HOSPADM

## 2018-12-26 RX ORDER — SPIRONOLACTONE 50 MG/1
50 TABLET, FILM COATED ORAL DAILY
Qty: 45 TABLET | Refills: 11 | Status: SHIPPED | OUTPATIENT
Start: 2018-12-26 | End: 2018-12-28

## 2018-12-26 RX ORDER — ACETAMINOPHEN 325 MG/1
650 TABLET ORAL ONCE
Status: CANCELLED
Start: 2018-12-26

## 2018-12-26 RX ADMIN — METHYLPREDNISOLONE SODIUM SUCCINATE 125 MG: 125 INJECTION, POWDER, FOR SOLUTION INTRAMUSCULAR; INTRAVENOUS at 08:53

## 2018-12-26 RX ADMIN — DIPHENHYDRAMINE HYDROCHLORIDE 50 MG: 50 INJECTION INTRAMUSCULAR; INTRAVENOUS at 08:58

## 2018-12-26 RX ADMIN — DIPHENHYDRAMINE HYDROCHLORIDE 50 MG: 50 INJECTION INTRAMUSCULAR; INTRAVENOUS at 12:50

## 2018-12-26 RX ADMIN — RITUXIMAB 1000 MG: 10 INJECTION, SOLUTION INTRAVENOUS at 09:35

## 2018-12-26 RX ADMIN — HYDROCORTISONE SODIUM SUCCINATE 100 MG: 100 INJECTION, POWDER, FOR SOLUTION INTRAMUSCULAR; INTRAVENOUS at 16:02

## 2018-12-26 RX ADMIN — SODIUM CHLORIDE 250 ML: 9 INJECTION, SOLUTION INTRAVENOUS at 08:50

## 2018-12-26 RX ADMIN — ACETAMINOPHEN 650 MG: 325 TABLET, FILM COATED ORAL at 08:49

## 2018-12-26 ASSESSMENT — PAIN SCALES - GENERAL: PAINLEVEL: MILD PAIN (3)

## 2018-12-26 NOTE — PROGRESS NOTES
Infusion Nursing Note:  Janine Cornell presents today for rituxan.    Patient seen by provider today: No   present during visit today: Not Applicable.    Note: Patient states she had more facial swelling and right sided pain after last rituxan, has also felt more tired.    Intravenous Access:  Peripheral IV placed.    Treatment Conditions:  Biological Infusion Checklist:    ~~~ NOTE: If the patient answers yes to any of the questions below, hold the infusion and contact ordering provider or on-call provider.    1. Have you recently had an elevated temperature, fever, chills, productive cough, coughing for 3 weeks or longer or hemoptysis,  abnormal vital signs, night sweats,  chest pain or have you noticed a decrease in your appetite, unexplained weight loss or fatigue? Yes, fatigue,night sweats  2. Do you have any open wounds or new incisions? No  3. Do you have any recent or upcoming hospitalizations, surgeries or dental procedures? No  4. Do you currently have or recently have had any signs of illness or infection or are you on any antibiotics? No  5. Have you had any new, sudden or worsening abdominal pain? No  6. Have you or anyone in your household received a live vaccination in the past 4 weeks? Please note:  No live vaccines while on biologic/chemotherapy until 6 months after the last treatment.  Patient can receive the flu vaccine (shot only) and the pneumovax.  It is optimal for the patient to get these vaccines mid cycle, but they can be given at any time as long as it is not on the day of the infusion. No  7. Have you recently been diagnosed with any new nervous system diseases (ie. Multiple sclerosis, Guillain [place], seizures, neurological changes) or cancer diagnosis? Are you on any form of radiation or chemotherapy? No  8. Are you pregnant or breast feeding or do you have plans of pregnancy in the future? No  9. Have you been having any signs of worsening depression or suicidal ideations?   "(benlysta only) No  10. Have there been any other new onset medical symptoms? No        Post Infusion Assessment:  Patient tolerated infusion poorly due to about half way through infusion patient states she felt a little scratchy throat and a little \"tight\" in her arms.  She did state that this happens at times, but usually with her right arm, not her left.  Left arm was measured at wrist, mid forearm and upper arm. Infusion stopped and IV benadryl given, after 30 minutes infusion restarted at 150cc/hr.  Increased rate to max of 250cc/hr.  At completion of rituxan patient states she feels puffy and uncomfortable in her arms, belly and legs.  Measurements did not change in left arm, nor is there any noticeable swelling in lower extremities.  Dr. Chahal notified and 100mg of solucortef given per order.   Stated that if vs remain within normal limits and if patient feels better she can be discharged.  After 30 minutes patient states she is feeling better, arms do not feel as tight.  VSS remain within normal limits.  Blood return noted pre and post infusion.  Site patent and intact, free from redness, edema or discomfort.  No evidence of extravasations.  Access discontinued per protocol.    Discharge Plan:   Copy of AVS reviewed with patient and/or family.    Patient discharged in stable condition accompanied by: self, has transportation home.  Departure Mode: Ambulatory.  No further appointments at this time, she will follow up with Dr. Chahal.  Nahed Yang RN                        "

## 2018-12-28 RX ORDER — METOPROLOL SUCCINATE 100 MG/1
100 TABLET, EXTENDED RELEASE ORAL DAILY
Qty: 90 TABLET | Refills: 2 | Status: SHIPPED | OUTPATIENT
Start: 2018-12-28 | End: 2019-07-11

## 2018-12-28 RX ORDER — LISINOPRIL AND HYDROCHLOROTHIAZIDE 20; 25 MG/1; MG/1
1 TABLET ORAL DAILY
Qty: 90 TABLET | Refills: 2 | Status: SHIPPED | OUTPATIENT
Start: 2018-12-28 | End: 2019-07-11

## 2018-12-28 RX ORDER — PRAVASTATIN SODIUM 40 MG
40 TABLET ORAL DAILY
Qty: 90 TABLET | Refills: 2 | Status: SHIPPED | OUTPATIENT
Start: 2018-12-28 | End: 2020-02-04

## 2018-12-28 RX ORDER — CLOPIDOGREL BISULFATE 75 MG/1
75 TABLET ORAL DAILY
Qty: 90 TABLET | Refills: 2 | Status: SHIPPED | OUTPATIENT
Start: 2018-12-28 | End: 2019-07-11

## 2018-12-28 RX ORDER — SPIRONOLACTONE 50 MG/1
50 TABLET, FILM COATED ORAL DAILY
Qty: 90 TABLET | Refills: 2 | Status: SHIPPED | OUTPATIENT
Start: 2018-12-28 | End: 2020-01-20

## 2018-12-28 NOTE — TELEPHONE ENCOUNTER
Called and spoke with Pt and informed her that 90-day supplies would be sent through the time of her one year f/u to her preferred pharmacy.  Pt verbalized understanding, agreed to current plan and denied any further questions.    Selena Underwood LPN

## 2018-12-28 NOTE — TELEPHONE ENCOUNTER
[facility] Call Center    Phone Message    May a detailed message be left on voicemail: yes    Reason for Call: Other: Pt calling upset about her blood pressure medications. She states that she doesn't understand why her meds are only being filled monthly as opposed to getting a 6-12 month supply. She states it's very hard for her to get her medications on all different days and also states that it doesn't always work with her insurance.   She is asking that, if this can't be adjusted, to change it to something that will be manageable for her.    Action Taken: Message routed to:  [facility] ([facility]  [facility]ULAR [facility]

## 2019-01-02 ENCOUNTER — TELEPHONE (OUTPATIENT)
Dept: RHEUMATOLOGY | Facility: CLINIC | Age: 54
End: 2019-01-02

## 2019-01-02 ENCOUNTER — TRANSFERRED RECORDS (OUTPATIENT)
Dept: HEALTH INFORMATION MANAGEMENT | Facility: CLINIC | Age: 54
End: 2019-01-02

## 2019-01-02 DIAGNOSIS — I77.82 ANCA-ASSOCIATED VASCULITIS (H): Primary | ICD-10-CM

## 2019-01-02 NOTE — TELEPHONE ENCOUNTER
"Received call from Dr. Martin, who is a ophthalmologist who has been involved \"tangentially\" in her care. She is recommending that pt see an orbital ophthalmologist here at the [place], since it is too hard for her to be seen at [place] for further eye care.  She has given pt her recommendations and will send her office visit note to us.      She is requesting that Dr. Mckinnon place a referral for ophthalmology-orbital specialist for pt.    Desiree Godinez RN  Rheumatology Clinic    "

## 2019-01-03 ENCOUNTER — TELEPHONE (OUTPATIENT)
Dept: OPHTHALMOLOGY | Facility: CLINIC | Age: 54
End: 2019-01-03

## 2019-01-03 DIAGNOSIS — M19.90 INFLAMMATORY ARTHRITIS: ICD-10-CM

## 2019-01-03 NOTE — TELEPHONE ENCOUNTER
Spoke to pt at 1430  H/o wegeners/orbital pseudotumor    Reviewed dr. Pierce/sreedhar would be good providers for f/u eye care    Pt states was referred back to dr. Vernon     Reviewed ok to reestablish care with dr. Vernon     Pt had concerns if would be able to be seen urgently for new symptoms that happen sporadically  Reviewed if dr. Vernon not available in reasonable time frame, have other eye providers pt could see    Pt verbally demonstrated understanding and states not ready to schedule appt at this time, but has clinic number to call and schedule with dr. Vernon when ready to schedule    Reggie Linares RN 2:42 PM 01/03/19    Note to Rheumatology care coordinator

## 2019-01-03 NOTE — TELEPHONE ENCOUNTER
Dr. Mckinnon referring back to ophthalmology after pt not able to travel to [facility] anymore    H/o Wegener's  Previously seen by Dr. Vernon for proptosis/orbital psuedotumor    Non-urgent referral    Previous notes from [place] in care everywhere    Reviewed Dr. Wells/Kari would be good providers for f/u  Would be able to coordinate with oculo plastics or uveitis if needed.    Pt on rituxin per rheumatology now      Will call pt this afternoon to assist in scheduling   Reggie Linares RN 9:05 AM 01/03/19    Left message with direct triage number for scheduling at [phone]  Reggie Linares RN 11:09 AM 01/03/19

## 2019-01-04 RX ORDER — HYDROXYCHLOROQUINE SULFATE 200 MG/1
400 TABLET, FILM COATED ORAL DAILY
Qty: 180 TABLET | Refills: 1 | Status: SHIPPED | OUTPATIENT
Start: 2019-01-04 | End: 2019-10-29

## 2019-01-11 ENCOUNTER — OFFICE VISIT (OUTPATIENT)
Dept: RHEUMATOLOGY | Facility: CLINIC | Age: 54
End: 2019-01-11
Attending: INTERNAL MEDICINE
Payer: COMMERCIAL

## 2019-01-11 VITALS
OXYGEN SATURATION: 98 % | WEIGHT: 167.4 LBS | DIASTOLIC BLOOD PRESSURE: 77 MMHG | TEMPERATURE: 98.9 F | SYSTOLIC BLOOD PRESSURE: 118 MMHG | HEART RATE: 86 BPM | BODY MASS INDEX: 29.65 KG/M2

## 2019-01-11 DIAGNOSIS — I87.2 VENOUS INCOMPETENCE: ICD-10-CM

## 2019-01-11 DIAGNOSIS — M31.30 WEGENER'S VASCULITIS: Primary | ICD-10-CM

## 2019-01-11 DIAGNOSIS — Z79.4 OTHER SPECIFIED DIABETES MELLITUS WITH COMPLICATION, WITH LONG-TERM CURRENT USE OF INSULIN: ICD-10-CM

## 2019-01-11 DIAGNOSIS — E13.8 OTHER SPECIFIED DIABETES MELLITUS WITH COMPLICATION, WITH LONG-TERM CURRENT USE OF INSULIN: ICD-10-CM

## 2019-01-11 DIAGNOSIS — R60.0 BILATERAL LOWER EXTREMITY EDEMA: ICD-10-CM

## 2019-01-11 DIAGNOSIS — M79.10 MYALGIA: ICD-10-CM

## 2019-01-11 PROCEDURE — G0463 HOSPITAL OUTPT CLINIC VISIT: HCPCS | Mod: ZF

## 2019-01-11 ASSESSMENT — PAIN SCALES - GENERAL: PAINLEVEL: MODERATE PAIN (5)

## 2019-01-11 NOTE — PATIENT INSTRUCTIONS
Labs next week    Refer to Dre Vasquez (ENT)    Follow up with Dr. Vernon eye doctor    Acupuncture referral    Return 3/6 at noon as add on

## 2019-01-11 NOTE — LETTER
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 Cornell is a 49 yo AAF who was referred to our clinic for evaluation and management of her joint pain in setting of positive RF 26.    Her joint symptoms first started more than a year ago, but over last 6-8 months fluctuating some good and bad days . All her joints are involved. Over last 5 months, hands became swollen, warm and painful. Tylenol does not help. Ketoprofen did not help. Was told to avoid NSAIDs given ACS. Reports AM/PM stiffness x 2-3 hr, can't make full fist when wakes up in AM.    She feels tired all the time. Reports worsening hair loss and unchanged  facial rash. Gets red sore flaky rash over cheeks across her face which is intermittent diagnosed Rosacea and is prescribed metronidazole gel which she has not started using. Reports occasional oral ulcers. Hands feel cold with red discoloration last winter. Has occasional dysphagia to both solids and liquid. Has dry eyes, is using OTC allergy eyedrops. Has chronic abdominal pain. Has h/o pancreatitis. Sometime has anxiety. Occasionally has numbness, tingling in fingers/toes. Has back and spine issues, her whole back and neck hurts, different areas at different time. She is wondering if she has FMS. Has hard time sleeping, has never been diagnosed with RBENNA. She does not know if he snores. She was found to have slightly positive RF in 2/2013. Has microcytic anemia.     Her arthralgia gets worse with drop in temperature. Reports body ache. Activity makes her pain worse. Her joint swelling isintermittent. Her body pain and joint pain is the same. Reports AM stiffness x 3 hr.    She has been doing acupuncture x few months and it is helping with her pain.  She thinks that the combination of plaquenil and acupuncture is helpful but overall she notice 30% in her symptoms relief.     Takes tylenol and tramadol on occasion for pain.    She decided to use different formulation of metformin which helped with her abdominal pain. I referred her to GI for evaluation of elevated lipase and abdominal pain. She decided to see GI in the future.       She is on  mg qd since 5/2014, tolerates it well and it helps her some. Denies taking any gabapentin due to chest pain and headches. She has had referral her for water aerobics, but she can't begin until she's healed from recent surgery in 10/2014. She thinks HCQ is helping but not enough to control all her pain, reports 2-3 hr of AM stiffness with ongoing diffuse arthralgia/myalgia along with intermittent swelling of hands. She does see her acupuncture person and it helps with her pain, has not started water aerobics yet. Has got her flu shot.       10/2015: Reports having pleuritic CP in 3/2015, was prescribed Lidoderm patches first. It did not help. Took prednisone (?dose) by her own, pain got better but it recurred off prednisone and gradually got worse to the point that she could not stand the pain anymore, was seen in ER in 5/2015, was treated with medrol dose pack which helped. Reports pain over hips, knees, ankles and fingers. Pain gets worse with walking. Reports myalgia, swelling of the arms. Pain over neck, shoulders. Has AM stiffness x 3-4 hr. Fingers swell up and become painful. Can't remember if steroid helped with joint pain. She had headaches, severe CP when she took tramadol and gabapentin and stopped taking them, sx resolved but she can't tell which one caused SEs but thinks that probably it was gabapentin. She has good days and tries to be more active but activity aggravates the pain. Headaches are intermittent. HCQ helps some not enough to control all the pain. No HCQ SEs. Had eye exam around July 2015.  Flexeril helps but PCP wants to change flexeril to zanaflex, reporting that she is NOT comfortable with the change. Acupuncture still helps an she continued to  do that. Concerned about fat pads she has in supraclavicular area, has h/o thyroid nodules. Continues having hair loss, takes iron for iron deficiency.     2/2016: She has 2 major complaints today, increased joint pain/swelling in hands/feet and recent Dx of optic neuritis in R eye, reports having similar problem about 20 yr ago, now recurred. Has a spot in R eye vision x long term, was seen by ophthalmology few days ago and was told to have optic neuritis. Gets joint pain, muscle pain. Can't  objects, hands are swollen. Knees, hips and ankles are painful. Reports more arthralgia. AM stiffness/ pain is 3-4 hr. She wants me to talk to her ophthalmologist directly.      She had botox inj for migraines which did not help her. She is going to have angiogram next wk, had to take more nitro for CP recently.       4/29/2016: She reports being on steroid taper x 3 since last visit. Reportedly, had orbit MRI, it showed swelling/inflamamtion of R lacrimal glands. She had painful swelling of her R eye, cause her headaches. Botox inj made her headaches worse. She is being dealing with this since 1/2016, with severe headaches and pain/swelling around R eye. Had biopsy in 3/2016. She is frustrated as prednisone causes her weight gain and her BG is difficult to control on it.    5/2016: At last visit, was prescribed MTX 10 mg po qwk to take along with FA 1 mg qd. She decided not to take MTX, is afraid of side effects, believes all these medications would harm her. She also believes that botox caused her inflammation around R eye. No major flare up of per-orbital inflamamtion since last visit but continues to have swelling around her R eye.      11/2016: Reports diffuse body ache, arthralgia, myalgia especially with weather change. No major flare up of campos-orbital  inflammation but her face/around R eye swells up from time to time. Reports 2 episodes of CP over past 2 mo, nitro sometimes helps and sometimes does not help. She has asthma and costochondritis and she has hard time to distinguish the origin of pain. Sometimes knees and fingers swell up. Her stiffness is sometimes all day.    4/2017:  Reports having sinusitis since last visit. Her R eye is dry and is using eyedrops to keep it moist. Reports having pain in ears. Was treated with antibiotics by PCP, it got better then it got worse. Reports catching infections easily. Then started having pressure over eyes with pain/headaches (exact start date is unknown). Pain gradually got worse and became persistent. Had sinus CT.     4/7/2017: Having a flare up of swelling, pain around her R orbit. Has not tried MTX yet.      5/2017: On MTX 10 mg po qwk since 4/7/2017, reports increased stomach pain and nausea and new headaches since start of MTX and it's not helping. Pain/swelling around R orbit is worse.    6/2017: She finished prednisone taper prescribed at last visit, R campos-orbital swelling significantly improved. 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    2/2018: R campos-orbital swelling has improved. Repeat R lacrimal gland biopsy in 8/2017 showed non specific inflammation with no lymphoma. She is off steroid. Did not tolerate AZA due to N/V and abdominal pain.      Is back with recurrence of R campos-orbital sweling x past 2 months. Pain really got worse over past 3wk, requries       9/2018: Declined ritiximab at last visit, lost f/u since 2/2018. Went to [place] and was seen on 8/29 by Dr. 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 Reportedly he ordered labs and recommended surgery since the inflammation of the R eye is back and is pushing the  eye down. Janine took some prednsione 30 mg every day few days a go for pain.    Today: She had lateral orbitotomy and debulking orbital mass right eye on 9/26/18 with Dr. Shah and Dr. Valdez at [place]. 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 There were no complications according to the op note.    Janine finally agreed to receive rituximab for her GPA. She had it as 1 gr q2wk x 2 on 12/12/2018 and 12/26/2018. Had minor allergic reaction which was managed by IV james cortef and benadryl. She is off prednisone about 6wk after surgery. Had bleeding ulcer from prednsione. Has pain over sinus, ears. Still has residual pain, swelling around her R eye is concerned about it. Wants to transfer her care to ophthalmology here as it's closer to her.    Her records were reviewed.        Component      Latest Ref Rng 2/28/2013 2/28/2013          12:14 PM 12:14 PM   WBC      4.0 - 11.0 10e9/L     RBC Count      3.8 - 5.2 10e12/L     Hemoglobin      11.7 - 15.7 g/dL     Hematocrit      35.0 - 47.0 %     MCV      78 - 100 fl     MCH      26.5 - 33.0 pg     MCHC      31.5 - 36.5 g/dL     RDW      10.0 - 15.0 %     Platelet Count      150 - 450 10e9/L     Specimen Description           Lyme Screen IgG and IgM           Vitamin D Deficiency screening      30 - 75 ug/L     Ferritin      10 - 300 ng/mL     Sed Rate      0 - 20 mm/h     ALLI Screen by EIA      <1.0     Rheumatoid Factor      0 - 14 IU/mL     CK Total      30 - 225 U/L  78   Uric Acid      2.5 - 7.5 mg/dL 6.7      Component      Latest Ref Rng 2/28/2013          12:14 PM   WBC      4.0 - 11.0 10e9/L    RBC Count      3.8 - 5.2 10e12/L    Hemoglobin      11.7 - 15.7 g/dL    Hematocrit      35.0 - 47.0 %    MCV      78 - 100 fl    MCH      26.5 - 33.0 pg    MCHC      31.5 - 36.5 g/dL    RDW      10.0 - 15.0 %    Platelet Count      150 - 450 10e9/L    Specimen Description       Serum   Lyme Screen IgG and IgM       Test value: <0.75....Interpretation:  Negative....If you highly suspect Lyme . . .   Vitamin D Deficiency screening      30 - 75 ug/L    Ferritin      10 - 300 ng/mL    Sed Rate      0 - 20 mm/h    ALLI Screen by EIA      <1.0    Rheumatoid Factor      0 - 14 IU/mL    CK Total      30 - 225 U/L    Uric Acid      2.5 - 7.5 mg/dL      Component      Latest Ref Rng 2/28/2013 2/28/2013 2/28/2013 2/28/2013          12:14 PM 12:14 PM 12:14 PM 12:14 PM   WBC      4.0 - 11.0 10e9/L       RBC Count      3.8 - 5.2 10e12/L       Hemoglobin      11.7 - 15.7 g/dL       Hematocrit      35.0 - 47.0 %       MCV      78 - 100 fl       MCH      26.5 - 33.0 pg       MCHC      31.5 - 36.5 g/dL       RDW      10.0 - 15.0 %       Platelet Count      150 - 450 10e9/L       Specimen Description             Lyme Screen IgG and IgM             Vitamin D Deficiency screening      30 - 75 ug/L       Ferritin      10 - 300 ng/mL    10   Sed Rate      0 - 20 mm/h   23 (H)    ALLI Screen by EIA      <1.0  <1.0 . . .     Rheumatoid Factor      0 - 14 IU/mL 26 (H)      CK Total      30 - 225 U/L       Uric Acid      2.5 - 7.5 mg/dL         Component      Latest Ref Rn 2/28/2013 2/28/2013          12:14 PM 12:14 PM   WBC      4.0 - 11.0 10e9/L 14.1 (H)    RBC Count      3.8 - 5.2 10e12/L 4.55    Hemoglobin      11.7 - 15.7 g/dL 10.7 (L)    Hematocrit      35.0 - 47.0 % 33.3 (L)    MCV      78 - 100 fl 73 (L)    MCH      26.5 - 33.0 pg 23.5 (L)    MCHC      31.5 - 36.5 g/dL 32.1    RDW      10.0 - 15.0 % 18.1 (H)    Platelet Count      150 - 450 10e9/L 407    Specimen Description           Lyme Screen IgG and IgM           Vitamin D Deficiency screening      30 - 75 ug/L  32   Ferritin      10 - 300 ng/mL     Sed Rate      0 - 20 mm/h     ALLI Screen by EIA      <1.0     Rheumatoid Factor      0 - 14 IU/mL     CK Total      30 - 225 U/L     Uric Acid      2.5 - 7.5 mg/dL          MRI CERVICAL SPINE WITHOUT CONTRAST 4/3/2013 12:47 PM    HISTORY: Cervicalgia. Degeneration of cervical  intervertebral disc.  MRI cervical spine. Evaluate bilateral supraclavicular lymph nodes.  Clinical enlargement.    TECHNIQUE: Multiplanar multisequence MRI of the cervical spine  without gadolinium contrast.    COMPARISON: None.    FINDINGS: The patient has a developmentally small central canal.  Images of the cervical cord reveals small areas of T2 hyperintensity  within the cervical cord. There is a small area of high signal  intensity at the C1 level. There is also a small area of high signal  intensity at the C6-C7 level. The area of high signal at C6-C7 is  located at an area of central spinal stenosis. This may be due to  myelomalacia. The area of high signal at C1-C2 is indeterminate.    C2-C3: Normal disc, facet joints, spinal canal and neural foramina.    C3-C4: Normal disc, facet joints, spinal canal and neural foramina.    C4-C5: Normal disc, facet joints, spinal canal and neural foramina.    C5-C6: There is degeneration of the disc. There is a mild annular  disc bulge. The central canal is mild-moderately narrowed. The  residual AP diameter of the central spinal canal measures  approximately 9 mm.    C6-C7: There is degeneration of the disc. There is loss of disc space  height. There is a diffuse annular disc bulge. There are associated  posterior osteophytes. There are severe central spinal stenosis at  this level. The residual AP diameter of the central spinal canal is  only about 6 mm. There is significant flattening of the cord. There  is high signal in the cord at this level consistent with  myelomalacia. There is moderate to severe right foraminal stenosis  due to and uncinate spur.    C7-T1: Normal disc, facet joints, spinal canal and neural foramina.            Result Impression       IMPRESSION:  1. Severe central spinal stenosis at C6-C7 due to a developmentally  small canal and due to a bulging disc and associated posterior  osteophyte. There is flattening of the cord at this level and  high  signal in the cord at this level consistent with myelomalacia.  2. There is a small, indeterminate 2-3 mm area of high signal  intensity in the cord at the C1 level. This could be due to gliosis  secondary to a previous infectious or inflammatory process.  Demyelinating disease could also have a similar appearance. Clinical  correlation suggested.    GAIL MORE MD     MRI LEFT UPPER EXTREMITY NON-JOINT WITHOUT CONTRAST, MRI RIGHT UPPER  EXTREMITY NON-JOINT WITHOUT CONTRAST April 3, 2013 1:32 PM    HISTORY: Cervicalgia. Degeneration of cervical intervertebral disc.    TECHNIQUE: Multiplanar, multisequence imaging of the brachial plexus  was performed without gadolinium contrast enhancement.    COMPARISON: None.    FINDINGS: No mass lesions are seen. No inflammatory process is  identified. The roots, trunks, branches, and divisions of both the  right and left brachial plexus appear normal. No adenopathy is seen.  The anterior scalene muscles and the middle scalene muscles appear  normal. Nodules are seen within the thyroid gland. The largest nodule  is seen in the left lobe of the thyroid. This measures about 1.8 cm  in diameter.            Result Impression       IMPRESSION:  1. Both the right and left brachial plexus regions appear normal.  2. Thyroid nodules. The largest nodule is in the left lobe of the  thyroid. It measures about 1.8 cm in diameter.       XR WRIST BILATERAL G/E 3 VIEWS    Narrative:        EXAMINATION:  1. Each hand 3 views  2. Each wrist 3 views     DATE: 5/1/2013     HISTORY: Arthropathy with concern for rheumatoid.     FINDINGS: 3 views of each hand and 3 views of each wrist are obtained  without prior for comparison. Alignment is normal. There is no  fracture or acute bone abnormality. No distinct erosions are seen.  Some spurring of the distal radial ulnar joint is noted on the right.       Impression:     IMPRESSION:  1. No evidence of an inflammatory arthropathy involving either  hand  or wrist.     VIELKA GUEVARA MD         XR FOOT BILATERAL G/E 3 VIEWS    Narrative:        EXAMINATION:  1. Each foot 3 views  2. Each ankle 3 views  3. Sacroiliac joint series     DATE: 5/1/2013     HISTORY: Arthropathy; concern for rheumatoid.     FINDINGS: No prior for comparison.     A frontal and bilateral oblique evaluation of the sacroiliac joints  shows no malalignment. Some patchy sclerosis is identified along the  central aspect of both sacroiliac joints, which is favored to be  degenerative. No distinct erosions are seen. The visualized portion  of the hip joint spaces appear maintained, with no erosive changes  identified. Mild endplate spurring is noted in the lower lumbar  spine.     3 views of each foot and 3 views of each ankle show no evidence of  acute fracture or dislocation. The metatarsal phalangeal,  tarsometatarsal and intertarsal joint spaces appear maintained. No  erosions are identified. There is no sign of acroosteolysis. The  ankle mortise and talar dome are intact bilaterally. Minimal spurring  is noted along the tip of the medial malleolus bilaterally.       Impression:     IMPRESSION:  1. Patchy sclerosis identified along the central aspect of both  sacroiliac joints, which is favored to be degenerative. No distinct  erosions are seen.  2. No evidence of an inflammatory arthropathy in either foot or ankle.     VIELKA GUEVARA MD     5/2013  CBC WITH PLATELETS DIFFERENTIAL       Component Value Range    WBC 11.3 (*) 4.0 - 11.0 10e9/L    RBC Count 4.56  3.8 - 5.2 10e12/L    Hemoglobin 11.1 (*) 11.7 - 15.7 g/dL    Hematocrit 34.3 (*) 35.0 - 47.0 %    MCV 75 (*) 78 - 100 fl    MCH 24.3 (*) 26.5 - 33.0 pg    MCHC 32.4  31.5 - 36.5 g/dL    RDW 16.1 (*) 10.0 - 15.0 %    Platelet Count 315  150 - 450 10e9/L    Diff Method Automated Method      % Neutrophils 71.6  40 - 75 %    % Lymphocytes 20.9  20 - 48 %    % Monocytes 4.3  0 - 12 %    % Eosinophils 2.8  0 - 6 %    % Basophils 0.2  0 - 2 %    %  Immature Granulocytes 0.2  0 - 0.4 %    Absolute Neutrophil 8.1  1.6 - 8.3 10e9/L    Absolute Lymphocytes 2.4  0.8 - 5.3 10e9/L    Absolute Monoctyes 0.5  0.0 - 1.3 10e9/L    Absolute Eosinophils 0.3  0.0 - 0.7 10e9/L    Absolute Basophils 0.0  0.0 - 0.2 10e9/L    Abs Immature Granulocytes 0.0  0 - 0.03 10e9/L   AMYLASE       Component Value Range    Amylase 103  30 - 110 U/L   COMPREHENSIVE METABOLIC PANEL       Component Value Range    Sodium 144  133 - 144 mmol/L    Potassium 3.8  3.4 - 5.3 mmol/L    Chloride 105  94 - 109 mmol/L    Carbon Dioxide 23  20 - 32 mmol/L    Anion Gap 17  6 - 17 mmol/L    Glucose 173 (*) 60 - 99 mg/dL    Urea Nitrogen 13  5 - 24 mg/dL    Creatinine 0.83  0.52 - 1.04 mg/dL    GFR Estimate 74  >60 mL/min/1.7m2    GFR Estimate If Black 90  >60 mL/min/1.7m2    Calcium 9.7  8.5 - 10.4 mg/dL    Bilirubin Total 0.4  0.2 - 1.3 mg/dL    Albumin 4.3  3.9 - 5.1 g/dL    Protein Total 7.8  6.8 - 8.8 g/dL    Alkaline Phosphatase 66  40 - 150 U/L    ALT 36  0 - 50 U/L    AST 28  0 - 45 U/L   CK TOTAL       Component Value Range    CK Total 66  30 - 225 U/L   CRP INFLAMMATION       Component Value Range    CRP Inflammation 10.4 (*) 0.0 - 8.0 mg/L   LIPASE       Component Value Range    Lipase 353 (*) 20 - 250 U/L   ERYTHROCYTE SEDIMENTATION RATE AUTO       Component Value Range    Sed Rate 26 (*) 0 - 20 mm/h   ROUTINE UA WITH MICROSCOPIC REFLEX TO CULTURE       Component Value Range    Color Urine Yellow      Appearance Urine Slightly Cloudy      Glucose Urine 30 (*) NEG mg/dL    Bilirubin Urine Negative  NEG    Ketones Urine 5 (*) NEG mg/dL    Specific Gravity Urine 1.026  1.003 - 1.035    Blood Urine Trace (*) NEG    pH Urine 5.0  5.0 - 7.0 pH    Protein Albumin Urine 10 (*) NEG mg/dL    Urobilinogen mg/dL Normal  0.0 - 2.0 mg/dL    Nitrite Urine Negative  NEG    Leukocyte Esterase Urine Negative  NEG    Source Midstream Urine      WBC Urine 1  0 - 2 /HPF    RBC Urine 4 (*) 0 - 2 /HPF    Squamous  Epithelial /HPF Urine <1  0 - 1 /HPF    Mucous Urine Present (*) NEG /LPF    Hyaline Casts 3 (*) 0 - 2 /LPF    Calcium Oxalate Moderate (*) NEG /HPF   COMPLEMENT C3       Component Value Range    Complement C3 143  76 - 169 mg/dL   COMPLEMENT C4       Component Value Range    Complement C4 31  15 - 50 mg/dL   HEPATITIS C ANTIBODY       Component Value Range    Hepatitis C Antibody Negative  NEG   CARDIOLIPIN ANTIBODY IGG AND IGM       Component Value Range    Cardiolipin IgG Marline    0 - 15.0 GPL    Value: <15.0      Interpretation:  Negative    Cardiolipin IgM Marline    0 - 12.5 MPL    Value: <12.5      Interpretation:  Negative   LUPUS PANEL       Component Value Range    Lupus Result    NEG    Value: Negative      (Note)      COMMENTS:      The INR is normal.      APTT is normal.  1:2 Mix is not indicated.      DRVVT Screen is normal.      Thrombin time is normal.      NEGATIVE TEST; A LUPUS ANTICOAGULANT WAS NOT DETECTED IN THIS      SPECIMEN WITHIN THE LIMITS OF THE TESTING REPERTOIRE.      If the clinical picture is strongly suggestive of an antiphospholipid      syndrome, recommend anticardiolipin and beta-2-glycoprotein (IgG and      IgM) antibody tests.      Leela Franks M.D.  [phone]      5/2/2013            INR =  0.93 N = 0.86-1.14  (No additional charge)      TT = 15.7 N = 13.0-19.0 sec  (No additional charge)            APTT'S:    Seconds      Reagent =  Stago LA      Normal  =  38      Patient  =  35      1:2 Mix  =  N/A      Reference =  31-43             DILUTE MADELINE VIPER VENOM TEST:      DRVVT Screen Ratio = 0.76 Normal = <1.21         IMMUNOGLOBULIN G SUBCLASSES       Component Value Range    IGG 1030  695 - 1620 mg/dL    IgG1 488  300 - 856 mg/dL    IgG2 325  158 - 761 mg/dL    IgG3 47  24 - 192 mg/dL    IgG4 18  11 - 86 mg/dL   SUNNY ANTIBODY PANEL       Component Value Range    Ribonucleic Protein IgG Antibody 0      Smith Antibody IgG 1      SSA (RO) Antibody IgG 4      SSB (LA)  Antibody IgG 0      Scleroderma Antibody IgG 0     BETA 2 GLYCOPROTEIN ANTIBODIES IGG IGM       Component Value Range    Beta-2-Glycopro IgG 1      Beta-2-Glycopro IgM 5     CYCLIC CIT PEPT IGG       Component Value Range    Cyclic Cit Pept IgG/IgA    <20 UNITS    Value: <20      Interpretation:  Negative   DNA DOUBLE STRANDED ANTIBODIES       Component Value Range    DNA-ds    0 - 29 IU/mL    Value: <15      Interpretation:  Negative       Component      Latest Ref Rng 3/11/2014   Sodium      133 - 144 mmol/L 137   Potassium      3.4 - 5.3 mmol/L 4.6   Chloride      94 - 109 mmol/L 97   Carbon Dioxide      20 - 32 mmol/L 20   Anion Gap      6 - 17 mmol/L 20 (H)   Glucose      60 - 99 mg/dL 243 (H)   Urea Nitrogen      5 - 24 mg/dL 35 (H)   Creatinine      0.52 - 1.04 mg/dL 1.47 (H)   GFR Estimate      >60 mL/min/1.7m2 38 (L)   GFR Estimate If Black      >60 mL/min/1.7m2 46 (L)   Calcium      8.5 - 10.4 mg/dL 9.7   Bilirubin Total      0.2 - 1.3 mg/dL 0.3   Albumin      3.9 - 5.1 g/dL 4.8   Protein Total      6.8 - 8.8 g/dL 7.9   Alkaline Phosphatase      40 - 150 U/L 73   ALT      0 - 50 U/L 35   AST      0 - 45 U/L 30   Color Urine       Yellow   Appearance Urine       Clear   Glucose Urine      NEG mg/dL 500 (A)   Bilirubin Urine      NEG Negative   Ketones Urine      NEG mg/dL Negative   Specific Gravity Urine      1.003 - 1.035 1.020   Blood Urine      NEG Negative   pH Urine      5.0 - 7.0 pH 5.5   Protein Albumin Urine      NEG mg/dL Negative   Urobilinogen Urine      0.2 - 1.0 EU/dL 0.2   Nitrite Urine      NEG Negative   Leukocyte Esterase Urine      NEG Negative   Source       Midstream Urine   WBC      4.0 - 11.0 10e9/L 14.0 (H)   RBC Count      3.8 - 5.2 10e12/L 5.02   Hemoglobin      11.7 - 15.7 g/dL 12.4   Hematocrit      35.0 - 47.0 % 37.1   MCV      78 - 100 fl 74 (L)   MCH      26.5 - 33.0 pg 24.7 (L)   MCHC      31.5 - 36.5 g/dL 33.4   RDW      10.0 - 15.0 % 15.3 (H)   Platelet Count      150 - 450  10e9/L 420       Component      Latest Ref Rng 3/25/2014   Sodium      133 - 144 mmol/L 137   Potassium      3.4 - 5.3 mmol/L 3.7   Chloride      94 - 109 mmol/L 100   Carbon Dioxide      20 - 32 mmol/L 21   Anion Gap      6 - 17 mmol/L 16   Glucose      60 - 99 mg/dL 214 (H)   Urea Nitrogen      5 - 24 mg/dL 20   Creatinine      0.52 - 1.04 mg/dL 1.02   GFR Estimate      >60 mL/min/1.7m2 58 (L)   GFR Estimate If Black      >60 mL/min/1.7m2 70   Calcium      8.5 - 10.4 mg/dL 9.5   Phosphorus      2.5 - 4.5 mg/dL 3.7   Albumin      3.9 - 5.1 g/dL 4.6     Component      Latest Ref Rng 4/30/2014   CRP Inflammation      0.0 - 8.0 mg/L 10.0 (H)   Sed Rate      0 - 20 mm/h 39 (H)   Rheumatoid Factor      <20 IU/mL <20   Glucose-6-PO4 Dehydrogenase       17.7     Component      Latest Ref Rng 6/11/2014 7/15/2014   WBC      4.0 - 11.0 10e9/L 11.0    RBC Count      3.8 - 5.2 10e12/L 4.74    Hemoglobin      11.7 - 15.7 g/dL 11.8    Hematocrit      35.0 - 47.0 % 36.1    MCV      78 - 100 fl 76 (L)    MCH      26.5 - 33.0 pg 24.9 (L)    MCHC      31.5 - 36.5 g/dL 32.7    RDW      10.0 - 15.0 % 13.9    Platelet Count      150 - 450 10e9/L 434    Diff Method       Automated Method    % Neutrophils       59.2    % Lymphocytes       31.6    % Monocytes       5.9    % Eosinophils       2.6    % Basophils       0.5    % Immature Granulocytes       0.2    Absolute Neutrophil      1.6 - 8.3 10e9/L 6.5    Absolute Lymphocytes      0.8 - 5.3 10e9/L 3.5    Absolute Monoctyes      0.0 - 1.3 10e9/L 0.7    Absolute Eosinophils      0.0 - 0.7 10e9/L 0.3    Absolute Basophils      0.0 - 0.2 10e9/L 0.1    Abs Immature Granulocytes      0 - 0.4 10e9/L 0.0    Sodium      133 - 144 mmol/L 138 140   Potassium      3.4 - 5.3 mmol/L 3.9 3.8   Chloride      94 - 109 mmol/L 97 103   Carbon Dioxide      20 - 32 mmol/L 26 22   Anion Gap      6 - 17 mmol/L 14.9 15   Glucose      60 - 99 mg/dL 111 (H) 163 (H)   Urea Nitrogen      5 - 24 mg/dL 28 (H) 15    Creatinine      0.52 - 1.04 mg/dL 1.10 (H) 0.99   GFR Estimate      >60 mL/min/1.7m2 53 (L) 60 (L)   GFR Estimate If Black      >60 mL/min/1.7m2 64 72   Calcium      8.5 - 10.4 mg/dL 10.3 9.3   Bilirubin Total      0.2 - 1.3 mg/dL 0.6 0.2   Albumin      3.9 - 5.1 g/dL 5.1 4.2   Protein Total      6.8 - 8.8 g/dL 9.0 (H) 7.2   Alkaline Phosphatase      40 - 150 U/L 87 69   ALT      0 - 50 U/L 37 33   AST      0 - 45 U/L 40 26   Color Urine       Straw    Appearance Urine       Clear    Glucose Urine      NEG mg/dL Negative    Bilirubin Urine      NEG Negative    Ketones Urine      NEG mg/dL Negative    Specific Gravity Urine      1.003 - 1.035 1.005    Blood Urine      NEG Negative    pH Urine      5.0 - 7.0 pH 5.0    Protein Albumin Urine      NEG mg/dL Negative    Urobilinogen mg/dL      0.0 - 2.0 mg/dL Normal    Nitrite Urine      NEG Negative    Leukocyte Esterase Urine      NEG Negative    Source       Midstream Urine    Lipase      20 - 250 U/L 403 (H)    CRP Inflammation      0.0 - 8.0 mg/L <5.0    N-Terminal Pro Bnp      0 - 125 pg/mL  55   Hemoglobin A1C      4.3 - 6.0 %  7.0 (H)     Component      Latest Ref Rn 11/12/2014   Sodium      133 - 144 mmol/L 135   Potassium      3.4 - 5.3 mmol/L 3.8   Chloride      94 - 109 mmol/L 104   Carbon Dioxide      20 - 32 mmol/L 24   Anion Gap      3 - 14 mmol/L 7   Glucose      70 - 99 mg/dL 125 (H)   Urea Nitrogen      7 - 30 mg/dL 17   Creatinine      0.52 - 1.04 mg/dL 1.18 (H)   GFR Estimate      >60 mL/min/1.7m2 49 (L)   GFR Estimate If Black      >60 mL/min/1.7m2 59 (L)   Calcium      8.5 - 10.1 mg/dL 9.8   WBC      4.0 - 11.0 10e9/L 11.1 (H)   RBC Count      3.8 - 5.2 10e12/L 4.05   Hemoglobin      11.7 - 15.7 g/dL 9.8 (L)   Hematocrit      35.0 - 47.0 % 30.6 (L)   MCV      78 - 100 fl 76 (L)   MCH      26.5 - 33.0 pg 24.2 (L)   MCHC      31.5 - 36.5 g/dL 32.0   RDW      10.0 - 15.0 % 15.5 (H)   Platelet Count      150 - 450 10e9/L 484 (H)   CT CHEST PULMONARY  EMBOLISM W CONTRAST 5/20/2015 4:57 PM  HISTORY: Pain. SOB. Elevated d-dimer.   TECHNIQUE: 65 mL Isovue 370. Axial images with coronal  reconstructions.  COMPARISON: None.  FINDINGS: Calcified granuloma with surrounding scarring in the  posterolateral left upper lobe. Triangular shaped opacity at the right  lung base in the lateral right middle lobe could represent some  scarring, atelectasis or infiltrate. There is also some scarring or  atelectasis in the posteromedial right lung base. Lungs otherwise  clear.  The pulmonary arteries are well opacified. No CT evidence for acute  pulmonary embolus. No aortic dissection.  Diffuse fatty infiltration of the liver.  IMPRESSION  IMPRESSION:   1. No pulmonary embolus identified.  2. Small focus of atelectasis, infiltrate or scarring in the lateral  right middle lobe.  3. Old granulomatous disease.  4. Otherwise negative.  SILVERIO VAZQUEZ MD  Component      Latest Ref Rng 3/27/2015   WBC      4.0 - 11.0 10e9/L 11.8 (H)   RBC Count      3.8 - 5.2 10e12/L 4.53   Hemoglobin      11.7 - 15.7 g/dL 11.0 (L)   Hematocrit      35.0 - 47.0 % 33.8 (L)   MCV      78 - 100 fl 75 (L)   MCH      26.5 - 33.0 pg 24.3 (L)   MCHC      31.5 - 36.5 g/dL 32.5   RDW      10.0 - 15.0 % 15.4 (H)   Platelet Count      150 - 450 10e9/L 419   Diff Method       Automated Method   % Neutrophils       75.3   % Lymphocytes       17.4   % Monocytes       4.4   % Eosinophils       2.5   % Basophils       0.2   % Immature Granulocytes       0.2   Absolute Neutrophil      1.6 - 8.3 10e9/L 8.9 (H)   Absolute Lymphocytes      0.8 - 5.3 10e9/L 2.1   Absolute Monoctyes      0.0 - 1.3 10e9/L 0.5   Absolute Eosinophils      0.0 - 0.7 10e9/L 0.3   Absolute Basophils      0.0 - 0.2 10e9/L 0.0   Abs Immature Granulocytes      0 - 0.4 10e9/L 0.0   Creatinine      0.52 - 1.04 mg/dL 1.12 (H)   GFR Estimate      >60 mL/min/1.7m2 52 (L)   GFR Estimate If Black      >60 mL/min/1.7m2 63   Iron      35 - 180 ug/dL 25 (L)    Iron Binding Cap      240 - 430 ug/dL 346   Iron Saturation Index      15 - 46 % 7 (L)   Albumin      3.4 - 5.0 g/dL 4.0   ALT      0 - 50 U/L 24   AST      0 - 45 U/L 15   CRP Inflammation      0.0 - 8.0 mg/L 28.0 (H)   Sed Rate      0 - 20 mm/h 81 (H)   Rheumatoid Factor      <20 IU/mL <20   Vitamin D Deficiency screening      30 - 75 ug/L 44   Ferritin      8 - 252 ng/mL 34     Component      Latest Ref Rng 7/29/2015   Sodium      133 - 144 mmol/L 137   Potassium      3.4 - 5.3 mmol/L 3.8   Chloride      94 - 109 mmol/L 106   Carbon Dioxide      20 - 32 mmol/L 23   Anion Gap      3 - 14 mmol/L 8   Glucose      70 - 99 mg/dL 148 (H)   Urea Nitrogen      7 - 30 mg/dL 17   Creatinine      0.52 - 1.04 mg/dL 1.02   GFR Estimate      >60 mL/min/1.7m2 58 (L)   GFR Estimate If Black      >60 mL/min/1.7m2 70   Calcium      8.5 - 10.1 mg/dL 9.0   Bilirubin Total      0.2 - 1.3 mg/dL 0.5   Albumin      3.4 - 5.0 g/dL 4.2   Protein Total      6.8 - 8.8 g/dL 7.4   Alkaline Phosphatase      40 - 150 U/L 64   ALT      0 - 50 U/L 23   AST      0 - 45 U/L 19     Results for FAVIO MARTINEZ (MRN [id number]) as of 10/30/2015 17:00   Ref. Range 8/21/2012 09:06 5/22/2013 14:22 4/14/2014 12:06 9/11/2014 12:35 12/4/2014 16:38   TSH Latest Range: 0.40-4.00 mU/L 0.83 0.43 0.27 (L) 0.23 (L) 0.22 (L)     Component      Latest Ref Rng 10/30/2015   WBC      4.0 - 11.0 10e9/L 13.4 (H)   RBC Count      3.8 - 5.2 10e12/L 4.76   Hemoglobin      11.7 - 15.7 g/dL 12.4   Hematocrit      35.0 - 47.0 % 37.9   MCV      78 - 100 fl 80   MCH      26.5 - 33.0 pg 26.1 (L)   MCHC      31.5 - 36.5 g/dL 32.7   RDW      10.0 - 15.0 % 14.5   Platelet Count      150 - 450 10e9/L 324   Diff Method       Automated Method   % Neutrophils       67.7   % Lymphocytes       22.7   % Monocytes       6.3   % Eosinophils       2.7   % Basophils       0.4   % Immature Granulocytes       0.2   Absolute Neutrophil      1.6 - 8.3 10e9/L 9.1 (H)   Absolute Lymphocytes       0.8 - 5.3 10e9/L 3.1   Absolute Monoctyes      0.0 - 1.3 10e9/L 0.9   Absolute Eosinophils      0.0 - 0.7 10e9/L 0.4   Absolute Basophils      0.0 - 0.2 10e9/L 0.1   Abs Immature Granulocytes      0 - 0.4 10e9/L 0.0   Creatinine      0.52 - 1.04 mg/dL 1.21 (H)   GFR Estimate      >60 mL/min/1.7m2 47 (L)   GFR Estimate If Black      >60 mL/min/1.7m2 57 (L)   Iron      35 - 180 ug/dL 70   Iron Binding Cap      240 - 430 ug/dL 428   Iron Saturation Index      15 - 46 % 16   Albumin      3.4 - 5.0 g/dL 4.6   ALT      0 - 50 U/L 29   AST      0 - 45 U/L 21   CRP Inflammation      0.0 - 8.0 mg/L <2.9   Sed Rate      0 - 20 mm/h 8   Vitamin D Deficiency screening      20 - 75 ug/L 46   Ferritin      8 - 252 ng/mL 18   TSH      0.40 - 4.00 mU/L 0.49   T4 Free      0.76 - 1.46 ng/dL 1.06   Free T3      2.3 - 4.2 pg/mL 2.8     Component      Latest Ref Rng 11/17/2015   Testosterone Total      8 - 60 ng/dL 19   Sex Hormone Binding Globulin      30 - 135 nmol/L 32   Free Testosterone Calculated      0.11 - 0.58 ng/dL 0.34   Vitamin A       0.61   Retinol Palmitate       <0.02 . . .   Vitamin A Interp       Normal . . .   Thyroglobulin Antibody      <40 IU/mL <20   Thyroid Peroxidase Antibody      <35 IU/mL 31   DHEA Sulfate      35 - 430 ug/dL 101   Zinc       68     Component      Latest Ref Rng 1/27/2016   Sodium      133 - 144 mmol/L 135   Potassium      3.4 - 5.3 mmol/L 4.0   Chloride      94 - 109 mmol/L 104   Carbon Dioxide      20 - 32 mmol/L 24   Anion Gap      3 - 14 mmol/L 7   Glucose      70 - 99 mg/dL 88   Urea Nitrogen      7 - 30 mg/dL 20   Creatinine      0.52 - 1.04 mg/dL 1.13 (H)   GFR Estimate      >60 mL/min/1.7m2 51 (L)   GFR Estimate If Black      >60 mL/min/1.7m2 62   Calcium      8.5 - 10.1 mg/dL 9.4   Bilirubin Total      0.2 - 1.3 mg/dL 0.7   Albumin      3.4 - 5.0 g/dL 4.3   Protein Total      6.8 - 8.8 g/dL 7.7   Alkaline Phosphatase      40 - 150 U/L 66   ALT      0 - 50 U/L 24   AST      0 - 45 U/L  "18   Cholesterol      <200 mg/dL 112   Triglycerides      <150 mg/dL 100   HDL Cholesterol      >49 mg/dL 34 (L)   LDL Cholesterol Calculated      <100 mg/dL 58   Non HDL Cholesterol      <130 mg/dL 78   N-Terminal Pro Bnp      0 - 125 pg/mL 29   Hemoglobin A1C      4.3 - 6.0 % 7.0 (H)   Amylase      30 - 110 U/L 60   Lipase      73 - 393 U/L 394 (H)   Troponin I ES      0.000 - 0.045 ug/L <0.015 . . .     Component      Latest Ref Rng 2/24/2016   Angiotensin Converting Enzyme       <5 (L) . . .   Neutrophil Cytoplasmic IgG Antibody       <1:20 . . .   Sed Rate      0 - 20 mm/h 86 (H)     Copath Report      Patient Name: FAVIO MARTINEZ   MR#: [id number]   Specimen #: [id number]   Collected: 3/15/2016   Received: 3/15/2016   Reported: 3/17/2016 13:33   Ordering Phy(s): PARVEEN ENRIQUEZ     ORIGINAL REPORT FOLLOWS ADDENDUM  ADDENDUM     TO ORIGINAL REPORT   Status: Signed Out   Date Ordered:3/18/2016   Date Complete:3/18/2016   Date Reported:3/18/2016 12:06   Signed Out By: Marianne Godfrey MD     INTERPRETATION:   This addendum is done to incorporate the results of fungal (GMS) stains   done on the specimen.  Specimen is negative for fungal organisms.  The   original final diagnosis remains unchanged.     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  Negative for malignancy(Please   see microscopic description)     Electronically signed out by:     Marianne Godfrey MD     CLINICAL HISTORY:   right orbital mass     GROSS:   The specimen is received labeled \"right orbital biopsy\" and consists of   red-tan nodule measuring 1.5 x 0.9 x 0.6 cm with one smooth side and   opposite rough side consistent with periosteum.  The specimen is   bisected revealing homogenous pale tan fleshy cut surface.  Touch "   preparations are prepared, air dried and fixed, portion of specimen is   submitted in RPMI for possible lymphoma workup [facility],   ([facility], [place], [place] ).  The remainder is entirely submitted.   (Dictated by: Marianne Godfrey MD 3/15/2016 04:45 PM)     MICROSCOPIC:     Specimen consists of portion of lacrimal gland with acute and chronic   inflammation.  Focal area of microabscess formation associated with   small areas of necrosis are also present.  Inflammation is seen   extending to the periorbital adipose tissue forming panniculitis.   Specimen is negative for malignancy.  Samples sent for immunophenotyping   to [facility], ([facility], [place], [place] ) reveals no evidence   of monoclonality or aberrant antigen expression.  A GMS (fungal) stain   is pending and results will be reported as an addendum.     CPT Codes:   A: 26983-RS5, 59756-BBCQG, SOH, 11337-OHBGC, 18347-EJSL     TESTING LAB LOCATION:   [address]  [address] [phone]     COLLECTION SITE:   Client: [facility]   Location: [facility] (S)            Component      Latest Ref Rng 4/29/2016   Nucleated RBCs      0 /100 0   Absolute Neutrophil      1.6 - 8.3 10e9/L 8.9 (H)   Absolute Lymphocytes      0.8 - 5.3 10e9/L 3.2   Absolute Monocytes      0.0 - 1.3 10e9/L 0.8   Absolute Eosinophils      0.0 - 0.7 10e9/L 0.2   Absolute Basophils      0.0 - 0.2 10e9/L 0.1   Abs Immature Granulocytes      0 - 0.4 10e9/L 0.1   Absolute Nucleated RBC       0.0   IGG      695 - 1620 mg/dL 836   IgG1      300 - 856 mg/dL 280 (L)   IgG2      158 - 761 mg/dL 277   IgG3      24 - 192 mg/dL 35   IgG4      11 - 86 mg/dL 16   RNP Antibody IgG      0.0 - 0.9 AI <0.2 . . .   Smith SUNNY Antibody IgG      0.0 - 0.9 AI <0.2 . . .   SSA (Ro) (SUNNY) Antibody, IgG      0.0 - 0.9 AI <0.2 . . .   SSB (La) (SUNNY) Antibody, IgG      0.0 - 0.9 AI <0.2 . . .   Scleroderma Antibody Scl-70 SUNNY  IgG      0.0 - 0.9 AI <0.2 . . .   Cholesterol      <200 mg/dL 154   Triglycerides      <150 mg/dL 220 (H)   HDL Cholesterol      >49 mg/dL 42 (L)   LDL Cholesterol Calculated      <100 mg/dL 67   Non HDL Cholesterol      <130 mg/dL 111   M Tuberculosis Result      NEG Negative   M Tuberculosis Antigen Value       0.00   Albumin      3.4 - 5.0 g/dL 4.3   ALT      0 - 50 U/L 30   AST      0 - 45 U/L 10   Complement C3      76 - 169 mg/dL 157   Complement C4      15 - 50 mg/dL 32   CRP Inflammation      0.0 - 8.0 mg/L <2.9   Sed Rate      0 - 20 mm/h 2   DNA-ds      <10 IU/mL 1   Cyclic Citrullinated Peptide Antibody, IgG      <7 U/mL 1   Rheumatoid Factor      <20 IU/mL <20   Proteinase 3 Antibody IgG      0.0 - 0.9 AI <0.2 . . .   Myeloperoxidase Antibody IgG      0.0 - 0.9 AI 2.5 (H)   Vitamin D Deficiency screening      20 - 75 ug/L 24   Hemoglobin A1C      4.3 - 6.0 % 7.9 (H)   ALLI by IFA IgG       1:40 (A) . . .     Component      Latest Ref Rng & Units 4/7/2017   WBC      4.0 - 11.0 10e9/L 11.3 (H)   RBC Count      3.8 - 5.2 10e12/L 4.77   Hemoglobin      11.7 - 15.7 g/dL 12.5   Hematocrit      35.0 - 47.0 % 38.7   MCV      78 - 100 fl 81   MCH      26.5 - 33.0 pg 26.2 (L)   MCHC      31.5 - 36.5 g/dL 32.3   RDW      10.0 - 15.0 % 13.2   Platelet Count      150 - 450 10e9/L 347   Diff Method       Automated Method   % Neutrophils      % 67.1   % Lymphocytes      % 22.9   % Monocytes      % 6.2   % Eosinophils      % 3.0   % Basophils      % 0.4   % Immature Granulocytes      % 0.4   Nucleated RBCs      0 /100 0   Absolute Neutrophil      1.6 - 8.3 10e9/L 7.5   Absolute Lymphocytes      0.8 - 5.3 10e9/L 2.6   Absolute Monocytes      0.0 - 1.3 10e9/L 0.7   Absolute Eosinophils      0.0 - 0.7 10e9/L 0.3   Absolute Basophils      0.0 - 0.2 10e9/L 0.1   Abs Immature Granulocytes      0 - 0.4 10e9/L 0.1   Absolute Nucleated RBC       0.0   IGG      695 - 1620 mg/dL 962   IgG1      300 - 856 mg/dL Test sent to [facility].  See ARMISC.   IgG2      158 - 761 mg/dL [facility] See ARMISC.   IgG3      24 - 192 mg/dL [facility] See ARMISC.   IgG4      11 - 86 mg/dL [facility] See ARMISC.   Result       SEE NOTE . . .   Test Name       IGG SUBCLASSES   Send Outs Misc Test Code       71943   Send Outs Misc Test Specimen       Serum   Creatinine      0.52 - 1.04 mg/dL 1.20 (H)   GFR Estimate      >60 mL/min/1.7m2 47 (L)   GFR Estimate If Black      >60 mL/min/1.7m2 57 (L)   Creatinine Urine      mg/dL    Albumin Urine mg/L      mg/L    Albumin Urine mg/g Cr      0 - 25 mg/g Cr    Myeloperoxidase Antibody IgG      0.0 - 0.9 AI 2.9 (H)   Proteinase 3 Antibody IgG      0.0 - 0.9 AI <0.2 . . .   Neutrophil Cytoplasmic IgG Antibody       1:80 (A) . . .   Angiotensin Converting Enzyme       <5 (L) . . .   Lab Scanned Result       TPMT GENOTYPE-Scanned   Vitamin C       56   ALT      0 - 50 U/L 23   Albumin      3.4 - 5.0 g/dL 4.3   AST      0 - 45 U/L 18   CRP Inflammation      0.0 - 8.0 mg/L 3.7   Sed Rate      0 - 30 mm/h 17   Vitamin D Deficiency screening      20 - 75 ug/L 40   Hemoglobin A1C      4.3 - 6.0 %      Component      Latest Ref Rng & Units 4/26/2017   WBC      4.0 - 11.0 10e9/L 11.8 (H)   RBC Count      3.8 - 5.2 10e12/L 4.23   Hemoglobin      11.7 - 15.7 g/dL 11.2 (L)   Hematocrit      35.0 - 47.0 % 35.0   MCV      78 - 100 fl 83   MCH      26.5 - 33.0 pg 26.5   MCHC      31.5 - 36.5 g/dL 32.0   RDW      10.0 - 15.0 % 13.4   Platelet Count      150 - 450 10e9/L 304   Diff Method       Automated Method   % Neutrophils      % 66.2   % Lymphocytes      % 22.7   % Monocytes      % 6.5   % Eosinophils      % 3.9   % Basophils      % 0.4   % Immature Granulocytes      % 0.3   Nucleated RBCs      0 /100 0   Absolute Neutrophil      1.6 - 8.3 10e9/L 7.8   Absolute Lymphocytes      0.8 - 5.3 10e9/L 2.7   Absolute Monocytes      0.0 - 1.3 10e9/L 0.8   Absolute Eosinophils      0.0 - 0.7 10e9/L 0.5   Absolute  Basophils      0.0 - 0.2 10e9/L 0.1   Abs Immature Granulocytes      0 - 0.4 10e9/L 0.0   Absolute Nucleated RBC       0.0   IGG      695 - 1620 mg/dL    IgG1      300 - 856 mg/dL    IgG2      158 - 761 mg/dL    IgG3      24 - 192 mg/dL    IgG4      11 - 86 mg/dL    Result          Test Name          Send Outs Misc Test Code          Send Outs Misc Test Specimen          Creatinine      0.52 - 1.04 mg/dL 1.24 (H)   GFR Estimate      >60 mL/min/1.7m2 46 (L)   GFR Estimate If Black      >60 mL/min/1.7m2 55 (L)   Creatinine Urine      mg/dL 121   Albumin Urine mg/L      mg/L <5   Albumin Urine mg/g Cr      0 - 25 mg/g Cr Unable to calculate due to low value   Myeloperoxidase Antibody IgG      0.0 - 0.9 AI    Proteinase 3 Antibody IgG      0.0 - 0.9 AI    Neutrophil Cytoplasmic IgG Antibody          Angiotensin Converting Enzyme          Lab Scanned Result          Vitamin C          ALT      0 - 50 U/L 25   Albumin      3.4 - 5.0 g/dL 4.1   AST      0 - 45 U/L 15   CRP Inflammation      0.0 - 8.0 mg/L    Sed Rate      0 - 30 mm/h    Vitamin D Deficiency screening      20 - 75 ug/L    Hemoglobin A1C      4.3 - 6.0 % 7.8 (H)   EXAMINATION: CT CHEST ABDOMEN PELVIS W/O CONTRAST, 4/7/2017 2:59 PM     TECHNIQUE: Helical CT images from the thoracic inlet through the  symphysis pubis were obtained without IV contrast.     COMPARISON: CT chest on 5/20/2015. CT abdomen pelvis on 6/11/2014     HISTORY: Granuloma of right orbit. Concern for malignancy, lymphoma.     Additional history from the EMR: 50-year-old female with rheumatoid  arthritis and fibromyalgia. Presented on April 2016 with new right  orbital inflammatory mass, biopsied showed panniculitis without  infection or malignancy. Some intermittent swelling around her right  orbit.     FINDINGS:     Chest: Cardiac size is not enlarged. No pericardial effusion.  Calcified subcentimeter mediastinal and left hilar lymph nodes. No  thoracic adenopathy. Coronary artery  calcifications/stents.  Benign-appearing coarse calcifications in the thyroid, better  described on ultrasound thyroid from 9/13/2016.     Central airways are patent. No pneumothorax or pleural effusion.  Calcified pulmonary granuloma in the posterior left upper lobe,  benign. Small focus of subpleural fibrosis and cysts along the  anterior lateral right lower lobe (image 96 series 6).     Abdomen and pelvis: Cholecystectomy. The liver, adrenal glands,  kidneys, spleen, pancreas, stomach, duodenum are without focal  abnormality on these noncontrast images.     No abdominal aortic aneurysm. No suspicious adenopathy in the abdomen  or pelvis. Bladder is intact. Calcified fibroid off the uterine  fundus. IUD in the uterus.     No focal bowel wall thickening or obstruction. No free air or free  fluid. Appendix is normal. Small periumbilical hernia.     Bones and soft tissues: No suspicious osseous lesions. Unremarkable  superficial soft tissues.         IMPRESSION: No evidence of malignancy in the chest, abdomen, or  pelvis.        I have personally reviewed the examination and initial interpretation  and I agree with the findings.     CONNIE ARMANDOBLANCO      Component      Latest Ref Rng & Units 6/1/2017   WBC      4.0 - 11.0 10e9/L 12.0 (H)   RBC Count      3.8 - 5.2 10e12/L 5.18   Hemoglobin      11.7 - 15.7 g/dL 13.8   Hematocrit      35.0 - 47.0 % 41.0   MCV      78 - 100 fl 79   MCH      26.5 - 33.0 pg 26.6   MCHC      31.5 - 36.5 g/dL 33.7   RDW      10.0 - 15.0 % 14.8   Platelet Count      150 - 450 10e9/L 322   Diff Method       Automated Method   % Neutrophils      % 76.7   % Lymphocytes      % 16.5   % Monocytes      % 4.6   % Eosinophils      % 1.9   % Basophils      % 0.3   Absolute Neutrophil      1.6 - 8.3 10e9/L 9.2 (H)   Absolute Lymphocytes      0.8 - 5.3 10e9/L 2.0   Absolute Monocytes      0.0 - 1.3 10e9/L 0.6   Absolute Eosinophils      0.0 - 0.7 10e9/L 0.2   Absolute Basophils      0.0 - 0.2 10e9/L 0.0    Color Urine       Yellow   Appearance Urine       Clear   Glucose Urine      NEG mg/dL Negative   Bilirubin Urine      NEG Negative   Ketones Urine      NEG mg/dL Negative   Specific Gravity Urine      1.003 - 1.035 1.010   pH Urine      5.0 - 7.0 pH 5.5   Protein Albumin Urine      NEG mg/dL Negative   Urobilinogen Urine      0.2 - 1.0 EU/dL 0.2   Nitrite Urine      NEG Negative   Blood Urine      NEG Negative   Leukocyte Esterase Urine      NEG Negative   Source       Midstream Urine   WBC Urine      0 - 2 /HPF O - 2   RBC Urine      0 - 2 /HPF O - 2   Squamous Epithelial /LPF Urine      FEW /LPF Few   Bacteria Urine      NEG /HPF Few (A)   Creatinine      0.52 - 1.04 mg/dL 1.19 (H)   GFR Estimate      >60 mL/min/1.7m2 48 (L)   GFR Estimate If Black      >60 mL/min/1.7m2 58 (L)   Protein Random Urine      g/L <0.05   Protein Total Urine g/gr Creatinine      0 - 0.2 g/g Cr Unable to calculate due to low value   Myeloperoxidase Antibody IgG      0.0 - 0.9 AI 1.9 (H)   Proteinase 3 Antibody IgG      0.0 - 0.9 AI <0.2 . . .   ALT      0 - 50 U/L 29   AST      0 - 45 U/L 18   Albumin      3.4 - 5.0 g/dL 4.6   CRP Inflammation      0.0 - 8.0 mg/L 6.1   Sed Rate      0 - 30 mm/h 13   Creatinine Urine Random      mg/dL 54   Neutrophil Cytoplasmic IgG Antibody       <1:20 . . .   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      Comparison: MRI brain 5/29/2013      Technique:   Orbits: Axial and coronal T1-weighted, and coronal T2-weighted images  obtained without intravenous contrast. Post-intravenous contrast  (using gadolinium) sagittal FLAIR, were obtained with fat-saturation,  focused on the orbits.  Brain: Axial susceptibility-weighted and FLAIR sequences were  obtained  of the whole brain without intravenous contrast, and postcontrast  axial T1-weighted images were obtained through the whole brain.   Contrast: 7.5mL Gadavist     Findings:  New asymmetric enlargement of the right lacrimal gland and enhancement  of the right lacrimal gland with surrounding ill-defined crescentic T2  hyperintense signal and enhancement within the right superior  superotemporal orbit, predominantly involving the extraconal space  with slight intraconal extension. No definite associated restricted  diffusion. No discrete extraocular muscle enlargement. Normal  symmetric optic nerve signal. Cavernous sinuses and orbital apices are  normal. Globes are normal.     Whole brain images are symmetric minimal leukoaraiosis and generalized  parenchymal volume loss. Ventricles are not enlarged. No intracranial  hemorrhage, mass effect, midline shift or abnormal extra axial fluid  collection. No abnormal foci of intracranial enhancement or restricted  diffusion. Paranasal sinuses and mastoid air cells are clear.            Impression:    New abnormal enlargement of the right lacrimal gland with surrounding  ill-defined T2 hyperintense signal and enhancement centered in the  superotemporal right orbital fat, which may represent   infectious/inflammatory dacryadenitis, orbital pseudotumor, lymphoma,  carcinoma, sarcoidosis or IgG4 disease..     I have personally reviewed the examination and initial interpretation  and I agree with the findings.     PATRICIA BRANTLEY MD      Component      Latest Ref Rng & Units 6/1/2017   WBC      4.0 - 11.0 10e9/L 12.0 (H)   RBC Count      3.8 - 5.2 10e12/L 5.18   Hemoglobin      11.7 - 15.7 g/dL 13.8   Hematocrit      35.0 - 47.0 % 41.0   MCV      78 - 100 fl 79   MCH      26.5 - 33.0 pg 26.6   MCHC      31.5 - 36.5 g/dL 33.7   RDW      10.0 - 15.0 % 14.8   Platelet Count      150 - 450 10e9/L 322   Diff Method       Automated Method   % Neutrophils      % 76.7   %  Lymphocytes      % 16.5   % Monocytes      % 4.6   % Eosinophils      % 1.9   % Basophils      % 0.3   Absolute Neutrophil      1.6 - 8.3 10e9/L 9.2 (H)   Absolute Lymphocytes      0.8 - 5.3 10e9/L 2.0   Absolute Monocytes      0.0 - 1.3 10e9/L 0.6   Absolute Eosinophils      0.0 - 0.7 10e9/L 0.2   Absolute Basophils      0.0 - 0.2 10e9/L 0.0   Color Urine       Yellow   Appearance Urine       Clear   Glucose Urine      NEG mg/dL Negative   Bilirubin Urine      NEG Negative   Ketones Urine      NEG mg/dL Negative   Specific Gravity Urine      1.003 - 1.035 1.010   pH Urine      5.0 - 7.0 pH 5.5   Protein Albumin Urine      NEG mg/dL Negative   Urobilinogen Urine      0.2 - 1.0 EU/dL 0.2   Nitrite Urine      NEG Negative   Blood Urine      NEG Negative   Leukocyte Esterase Urine      NEG Negative   Source       Midstream Urine   WBC Urine      0 - 2 /HPF O - 2   RBC Urine      0 - 2 /HPF O - 2   Squamous Epithelial /LPF Urine      FEW /LPF Few   Bacteria Urine      NEG /HPF Few (A)   Creatinine      0.52 - 1.04 mg/dL 1.19 (H)   GFR Estimate      >60 mL/min/1.7m2 48 (L)   GFR Estimate If Black      >60 mL/min/1.7m2 58 (L)   Protein Random Urine      g/L <0.05   Protein Total Urine g/gr Creatinine      0 - 0.2 g/g Cr Unable to calculate due to low value   Myeloperoxidase Antibody IgG      0.0 - 0.9 AI 1.9 (H)   Proteinase 3 Antibody IgG      0.0 - 0.9 AI <0.2 . . .   ALT      0 - 50 U/L 29   AST      0 - 45 U/L 18   Albumin      3.4 - 5.0 g/dL 4.6   CRP Inflammation      0.0 - 8.0 mg/L 6.1   Sed Rate      0 - 30 mm/h 13   Creatinine Urine Random      mg/dL 54   Neutrophil Cytoplasmic IgG Antibody       <1:20 . . .   Creatinine Urine      mg/dL 54       Patient Name: FAVIO MARTINEZ   MR#: [id number]   Specimen #: [id number]   Collected: 8/4/2017   Received: 8/4/2017   Reported: 8/9/2017 16:19   Ordering Phy(s): CRISTHIAN FLORES     For improved result formatting, select 'View Enhanced Report Format'   under Linked  "Documents section.     SPECIMEN(S):   Eye, right lacrimal gland     FINAL DIAGNOSIS:   Eye, right, \"lacrimal gland\", biopsy   - Dense fibrosis and patchy inflammation   - No residual lacrimal gland seen     COMMENT:   Sections show tissue involved by dense fibrosis and patchy inflammation.   There is no morphologic or immunohistochemical evidence of lymphoma. By   separate report, concurrent flow cytometry studies ([id number]),   performed at the [facility], show polytypic B cells and no   aberrant immunophenotype on T cells. Immunohistochemical stains for IgG   and IgG-4 were performed, which provide no support for IgG-4-related   disease. Some of these changes may be related to prior surgery. Sjögren   disease is not excluded. There is no evidence of malignancy. Dr. aMx Conway has reviewed this case and concurs.     Electronically signed out by:     Antonella Beth M.D.   INDICATION:  Right eye edema. Reported history of right orbital biopsy yielding pathology consistent with idiopathic inflammation.    TECHNIQUE:  Noncontrast CT images were obtained through the brain and facial bones.    COMPARISON:  CT sinus 03/27/2017, MRI brain and orbits 02/15/2016.     FINDINGS:  CT facial bones:   Large soft tissue lesion centered within the lateral intraconal and extraconal right orbit has significantly increased in size compared to the CT dated 03/27/2017. The lesion is inseparable from the right superior, lateral, and inferior rectus muscles, as well as the right lacrimal gland. The lesion demonstrates extension posteriorly slightly proximal to the orbital apex, as well as extension into the medial orbit superiorly and anteriorly. Mass effect includes medial bowing of the optic nerve sheath complex and pronounced proptosis of the right globe.  No mass is identified in the right orbit.  Torus palatinus.   Minimal mucosal thickening in the ethmoid air cells. The remainder of the visualized paranasal " sinuses are clear.  CT brain:  The ventricles and sulci within normal limits for patient age. No mass effect or midline shift. The gray-white differentiation is maintained.  No acute intracranial hemorrhage or pathologic extra-axial fluid collection.  The calvarium is intact. The mastoid air cells are clear.    IMPRESSION:  1. Large soft tissue lesion centered within the lateral intraconal and extraconal right orbit has significantly increased in size compared to the CT dated 03/27/2017. The lesion is inseparable from the right lacrimal gland and rectus musculature. Mass effect includes medial bowing of the optic nerve sheath complex and pronounced proptosis of the right globe. Given provided history, findings may represent a progressive inflammatory etiology (pseudotumor). Other differential considerations, such as sarcoidosis or lymphoma, could also have this appearance.  2. No acute intracranial hemorrhage or intracranial mass effect.    Please note that all CT scans at this facility use dose modulation, iterative reconstruction, and/or weight-based dosing when appropriate to reduce radiation dose to as low as reasonably achievable.    Dictated by Charles Ward MD @ Jul 16 2018  3:54PM    Signed by Dr. Charles Ward @ Jul 16 2018  4:20PM    ROS:  A comprehensive ROS was done, positives are per HPI.        HISTORY REVIEW:  Past Medical History:   Diagnosis Date     Abnormal glandular Papanicolaou smear of cervix 1992     Allergic rhinitis     Allergy, airborne subst     Arthritis      ASCVD (arteriosclerotic cardiovascular disease)      Chronic pain      Chronic pancreatitis (H)     idiopathic, Tx: PPI, antioxidants, pancreatic enzymes     Common migraine      Coronary artery disease      Costochondritis      Difficulty in walking(719.7)      Dyspnea on exertion      Ectasia, mammary duct     [facility], persistent nipple discharge     Elevated fasting glucose      Gastro-oesophageal reflux  disease      Hernia      History of angina      Hyperlipidemia LDL goal < 100      Hypertension goal BP (blood pressure) < 140/90     Essential hypertension     Iron deficiency anemia      Ischemic cardiomyopathy      Menorrhagia      Migraine headaches      Mild persistent asthma      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 Matias, neg w/u, Rx'd w/prednisone     Ventral hernia, unspecified, without mention of obstruction or gangrene 2012     Past Surgical History:   Procedure Laterality Date     C ECHO HEART XTHORACIC,COMPLETE, W/O DOPPLER  04    [facility] WNL, EF 60%     C/SECTION, LOW TRANSVERSE           CARDIAC SURGERY      cardiac stent- recent in 16; 4 other stents     DILATION AND CURETTAGE N/A 2016    Procedure: DILATION AND CURETTAGE;  Surgeon: Nahed Butler MD;  Location: [facility]     HC UGI ENDOSCOPY W EUS  08    Dr. Pastrana, possible chronic pancreatitis, fatty liver     [place] [facility] [place] INTRAUTERINE DEVICE N/A 2016    Procedure: INSERT INTRAUTERINE DEVICE;  Surgeon: Nahed Butler MD;  Location: [facility]     INT UTERINE FIBRIOD EMBOLIZATION  10/29/2014     LAPAROSCOPIC CHOLECYSTECTOMY  08    Dr. Arnol GRUBBS TX, CERVICAL      s/p LEEP     ORBITOTOMY Right 3/15/2016     Procedure: ORBITOTOMY;  Surgeon: Myron Cyr MD;  Location: [facility]     ORBITOTOMY Right 2017    Procedure: ORBITOTOMY;  RIGHT ORBITOTOMY AND BIOPSY;  Surgeon: Charis Holbrook MD;  Location: [facility]     REPAIR PTOSIS Right 2017    Procedure: REPAIR PTOSIS;  RIGHT UPPER LID PTOSIS REPAIR;  Surgeon: Myron Cyr MD;  Location: [facility]     UPPER GI ENDOSCOPY  10/21/08    mild gastritis, Dr. Hidalgo     Family History   Problem Relation Age of Onset     Heart Disease Father 50        heart attack     Cerebrovascular Disease Father      Diabetes Father      Hypertension Father      Depression Father      C.A.D. Father      Hypertension Mother      Arthritis Mother      Heart Disease Mother         long qt syndrome     Thyroid Disease Mother      C.A.D. Mother      Heart Disease Brother 15        MI at 15, 16.      Diabetes Maternal Uncle      Hypertension Maternal Aunt      Hypertension Maternal Uncle      Arthritis Brother         he passed away, had severe arthritis at age 11     Arthritis Maternal Uncle      Eye Disorder Maternal Uncle         cataracts     Neurologic Disorder Sister         migraines     Neurologic Disorder Sister         migraines     Respiratory Son         asthma     Cerebrovascular Disease Maternal Uncle      C.A.D. Brother      Family History Negative Other         neg for RA, SLE     Unknown/Adopted No family hx of         unknown neurological issues in her family, mother was adopted     Skin Cancer No family hx of         No known family hx of skin cancer     Social History     Socioeconomic History     Marital status: Single     Spouse name: Not on file     Number of children: 1     Years of education: Not on file     Highest education level: Not on file   Social Needs     Financial resource strain: Not on file     Food insecurity - worry: Not on file     Food insecurity - inability: Not on file     Transportation needs - medical: Not on file      Transportation needs - non-medical: Not on file   Occupational History     Employer: NONE    Tobacco Use     Smoking status: Former Smoker     Packs/day: 0.20     Years: 1.00     Pack years: 0.20     Types: Cigarettes     Last attempt to quit: 2016     Years since quittin.9     Smokeless tobacco: Never Used   Substance and Sexual Activity     Alcohol use: No     Alcohol/week: 0.0 oz     Drug use: No     Sexual activity: Not Currently   Other Topics Concern     Parent/sibling w/ CABG, MI or angioplasty before 65F 55M? Yes   Social History Narrative    Balanced Diet - sometimes    Osteoporosis Prevention Measures - Dairy servings per day: 2 servings weekly    Regular Exercise -  Yes Describe walking 4 times a week    Dental Exam - NO    Seatbelts used - Yes    Self Breast Exam - Yes    Abuse: Current or Past (Physical, Sexual or Emotional)- No    Do you have any concerns about STD's -  No    Do you feel safe in your environment - No    Guns stored in the home - No    Sunscreen used - Yes    Lipids -  YES - Date:     Glucose -  YES - Date:     Eye Exam - YES - Date: one year ago    Colon Cancer Screening - No    Hemoccults - NO    Pap Test -  YES - Date: , remote history of LEEP    Mammography - YES - Date: last spring, would like to discuss, needs a referral to [facility]    DEXA - NO    Immunizations reviewed and up to date - Yes, last td given in  or      Patient Active Problem List   Diagnosis     Seasonal affective disorder (H)     Allergic rhinitis     PCOS (polycystic ovarian syndrome)     Moderate persistent asthma     Chronic pancreatitis (H)     Hypertension goal BP (blood pressure) < 140/90     Common migraine     NSTEMI (non-ST elevated myocardial infarction) (H)     Hyperlipidemia LDL goal <70     ASCVD (arteriosclerotic cardiovascular disease)     GERD (gastroesophageal reflux disease)     Ischemic cardiomyopathy     Hypertensive heart disease     Uterine  leiomyoma     Iron deficiency anemia     Costochondritis     Vitamin D deficiency     Breast cancer screening     Spinal stenosis in cervical region     Fibromyalgia     Seronegative rheumatoid arthritis (H)     Type 2 diabetes, HbA1C goal < 8% (H)     Type 2 diabetes mellitus with other specified complication (H)     Hyperlipidemia associated with type 2 diabetes mellitus (H)     Hypertension associated with diabetes (H)     Overweight with body mass index (BMI) 25.0-29.9     Tobacco use disorder     Telogen effluvium     CAD S/P percutaneous coronary angioplasty     Status post coronary angiogram     ANCA-associated vasculitis (H)     Wegener's vasculitis (H)       Pregnancy Hx: She is . All misscarriages were in first trimester. H/o OC use in the past. 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    PMHx, FHx, SHx were reviewed, unchanged.      Outpatient Encounter Medications as of 2019   Medication Sig Dispense Refill     albuterol (PROAIR HFA, PROVENTIL HFA, VENTOLIN HFA) 108 (90 BASE) MCG/ACT inhaler Inhale 2 puffs into the lungs every 6 hours as needed for shortness of breath / dyspnea or wheezing 3 Inhaler 1     ASPIRIN LOW DOSE 81 MG EC tablet Take 1 tablet (81 mg) by mouth daily 90 tablet 3     BASAGLAR 100 UNIT/ML injection Inject 40 Units Subcutaneous daily 12 mL 2     blood glucose monitoring (NO BRAND SPECIFIED) meter device kit Use to test blood sugar 4 X times daily or as directed. 1 kit 0     blood glucose monitoring (NO BRAND SPECIFIED) test strip 1 strip by In Vitro route 4 times daily Test as directed. Please dispense three months and three months of refills. Thank you. 360 each 3     blood glucose monitoring (ONE TOUCH DELICA) lancets Use to test blood sugars 4 times daily or as directed. 4 Box 3     calcium carbonate (TUMS) 500 MG chewable tablet Take 3-4 chew tab by mouth daily as needed.       clopidogrel (PLAVIX) 75 MG tablet Take 1 tablet (75 mg) by mouth daily 90  tablet 2     cyclobenzaprine (FLEXERIL) 10 MG tablet Take 1 tablet (10 mg) by mouth 2 times daily as needed for muscle spasms 180 tablet 0     insulin pen needle (BD MARCK U/F) 32G X 4 MM USE DAILY OR AS DIRECTED 300 each 3     acetaminophen (TYLENOL) 325 MG tablet Take 1-2 tablets (325-650 mg) by mouth every 6 hours as needed for pain (headache) 250 tablet 0     albuterol (2.5 MG/3ML) 0.083% neb solution INL 1 VIAL VIA NEBULIZATION Q 4 TO 6 HOURS PRN  1     COMPRESSION STOCKINGS 2 each daily Apply one 10-15 mmHg compression stocking to each lower extgmierty during the day and remove before bedtime. (Patient not taking: Reported on 12/12/2018) 4 each 2     cycloSPORINE (RESTASIS) 0.05 % ophthalmic emulsion Place 1 drop into the right eye every 12 hours       desonide (DESOWEN) 0.05 % cream Apply topically 2 times daily       dicyclomine (BENTYL) 20 MG tablet TAKE 1 TABLET(20 MG) BY MOUTH FOUR TIMES DAILY AS NEEDED 60 tablet 3     diphenhydrAMINE (BENADRYL) 25 MG capsule TK 1 TO 2 CS PO QHS  4     EPIPEN 2-RIKY 0.3 MG/0.3ML injection INJECT 0.3 MG INTO THE MUSCLE PRF ANAPHYALAXIS  0     ferrous gluconate (FERGON) 324 (38 Fe) MG tablet Take 1 tablet (324 mg) by mouth 2 times daily (with meals) 180 tablet 3     fexofenadine (ALLEGRA) 180 MG tablet Take 1 tablet by mouth daily as needed. 90 tablet 3     fluocinolone (SYNALAR) 0.01 % solution Apply topically daily as needed       fluocinonide (LIDEX) 0.05 % external solution Apply topically 2 times daily To areas of itch on the scalp as needed. 60 mL 11     fluticasone-vilanterol (BREO ELLIPTA) 100-25 MCG/INH inhaler Inhale 1 puff into the lungs daily       folic acid (FOLVITE) 1 MG tablet Take 1 tablet by mouth daily 90 tablet 3     hydroxychloroquine (PLAQUENIL) 200 MG tablet Take 2 tablets (400 mg) by mouth daily 180 tablet 1     ketoconazole (NIZORAL) 2 % cream Apply topically as needed   3     ketoconazole (NIZORAL) 2 % shampoo Apply topically daily as needed        lidocaine (viscous), alum & mag hydroxide-simethicone GI Cocktail 30 ml times on po now (Patient not taking: Reported on 12/12/2018) 30 mL 0     lidocaine (XYLOCAINE) 5 % ointment Apply 1 g topically 2 times daily as needed for moderate pain (Patient not taking: Reported on 12/12/2018) 50 g 5     lisinopril-hydrochlorothiazide (PRINZIDE/ZESTORETIC) 20-25 MG tablet Take 1 tablet by mouth daily 90 tablet 2     Magnesium Oxide -Mg Supplement 250 MG TABS TK 1 T PO BID. REDUCE IF STOOLS LOOSEN  11     metFORMIN (GLUCOPHAGE-XR) 500 MG 24 hr tablet TAKE 2 TABLETS(1000 MG) BY MOUTH DAILY WITH DINNER 180 tablet 0     metoclopramide (REGLAN) 10 MG tablet Take 1 tablet (10 mg) by mouth 4 times daily (before meals and nightly) 90 tablet 0     metoprolol succinate ER (TOPROL-XL) 100 MG 24 hr tablet Take 1 tablet (100 mg) by mouth daily 90 tablet 2     metroNIDAZOLE (NORITATE) 1 % cream Apply topically daily       mometasone (NASONEX) 50 MCG/ACT spray Spray 2 sprays into both nostrils daily       montelukast (SINGULAIR) 10 MG tablet Take 1 tablet (10 mg) by mouth At Bedtime 30 tablet 1     Multiple Vitamin (DAILY-GERALD) TABS Take 1 tablet by mouth daily  0     nitroGLYcerin (NITROSTAT) 0.4 MG sublingual tablet Place 1 tablet (0.4 mg) under the tongue every 5 minutes as needed for chest pain 25 tablet 1     nystatin (MYCOSTATIN) 350286 UNITS TABS tablet TK 2 TS PO BID  0     olopatadine (PATANOL) 0.1 % ophthalmic solution Place 1 drop into both eyes 2 times daily as needed for allergies. (Patient not taking: Reported on 12/12/2018) 5 mL 12     ondansetron (ZOFRAN-ODT) 8 MG ODT tab Take 1 tablet (8 mg) by mouth every 8 hours as needed for nausea 60 tablet 1     Ondansetron HCl (ZOFRAN PO)        pantoprazole (PROTONIX) 40 MG EC tablet Take 1 tablet (40 mg) by mouth daily Pork free tablets. (Patient not taking: Reported on 12/26/2018) 30 tablet 1     pravastatin (PRAVACHOL) 40 MG tablet Take 1 tablet (40 mg) by mouth daily 90 tablet  2     ranitidine (ZANTAC) 150 MG tablet Take 1 tablet (150 mg) by mouth 2 times daily 180 tablet 1     spironolactone (ALDACTONE) 50 MG tablet Take 1 tablet (50 mg) by mouth daily . Take additional 0.5 tablets by mouth once daily as needed for weight gain. 90 tablet 2     sucralfate (CARAFATE) 1 GM tablet Take 1 tablet (1 g) by mouth 4 times daily 120 tablet 3     SYMBICORT 80-4.5 MCG/ACT Inhaler INHALE 2 PUFFS PO BID RINSE AND EXPECTORATE AFTER  3     traMADol (ULTRAM) 50 MG tablet Take 1 tablet (50 mg) by mouth every 8 hours as needed for moderate pain 60 tablet 3     Triamcinolone Acetonide (NASACORT ALLERGY 24HR NA)        vitamin D (ERGOCALCIFEROL) 04870 UNIT capsule Take 1 capsule (50,000 Units) by mouth every 7 days Need a Vitamin D level in 2 months. (Patient taking differently: Take 50,000 Units by mouth every 7 days Need a Vitamin D level in 2 months.) 8 capsule 0     [DISCONTINUED] AEROSPAN 80 MCG/ACT AERS INHALE 2 PUFFS PO BID.  RINSE MOUTH AFTERWARDS  4     [DISCONTINUED] azelastine (ASTELIN) 0.1 % spray U 2 SPRAYS IEN BID  0     [DISCONTINUED] bacitracin ophthalmic ointment Apply to incision line three times daily. (Patient not taking: Reported on 12/12/2018) 3.5 g 0     [DISCONTINUED] COMPRESSION STOCKINGS 2 each daily (Patient not taking: Reported on 1/11/2019) 4 each 2     [DISCONTINUED] insulin glargine (LANTUS) 100 UNIT/ML injection Inject 40 Units Subcutaneous daily (with breakfast)       No facility-administered encounter medications on file as of 1/11/2019.        Allergies   Allergen Reactions     Amoxicillin-Pot Clavulanate      Augmentin      Unknown possible hives and edema     Azithromycin      Diatrizoate Other (See Comments)     Pt wants this listed because she is allergic to shellfish      Imitrex [Sumatriptan]      Severe face/neck/chest tightness and flushing side effects      Penicillins Hives     Unknown      Pork Allergy      Stomach pain, cramping, diarrhea, itching, nausea and  headaches     Shellfish Allergy Hives and Swelling     Sulfa Drugs Hives and Swelling     Zithromax [Azithromycin Dihydrate] Swelling     Unknown          Ph.E:    Vitals:    01/11/19 1542   Weight: 75.9 kg (167 lb 6.4 oz)           Constitutional: WD/WN. Pleasant. In no acute distress.   Eyes: Swelling around R eye significantly improved since last visit. EOMI  HEENT: No oral ulcers or thrush. Normal salivary pool.  Neck: No cervical LAP, + thyromegaly  Chest: Clear to auscultation bilaterally  CV: RRR, no murmurs/ rubs or gallops. No edema, clubbing or cyanosis.   GI: Abdomen is soft and non tender.  MS: No synovitis or joint tenderness. Cool joints. No joint deformities. Full ROM of the joints. No nodules. +Fibromyalgia trigger points.  Skin: No livedo, periungual erythema, digital ulcers or nail changes. + alopecia with scales, facial malar erythema (looks like seborrhoic dermatitis).  Neuro: A&O x 3. Grossly non focal, muscular power 5/5 in all ext  Psych: NL affect and mood    Assessment/ plan:    1-Seropositive non-erosive RA (arthritis, AM stiffness, high CRP, RF 26 but re-check neg 3/2015 on HCQ) Dx 5/2013, FMS, new pleuritic CP 3/20-15-5/2015 resolved on steroids. GPA. She is on  mg bid since 5/2014. She tolerates it well and it's helping some but not enough to control all her pain. Continues having flare ups of joint pain, swelling also has FMS. Joint sx are getting worse. Had high ESR/CRP in 3/2015 and new pleuritic CP between 3/2015-5/2015 which resolved after taking medrol dose pack prescribed 5/20/2015. Her pleuritic CP could be related to her RA. Then stopped tramadol as it blames it for recent episodes of CP.    In the past, MTX was recommended, she declined.    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 Etiology of mass  unclear, but it's inflammatory and related to pt's underlying autoimmune disease (inflammatory arthritis, serositis). 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    Her work up showed borderline + ALLI and slightly elevated MPO. I told her prednisone is not good for her diabetes and weight gain. Recommended a trial of MTX as next step. Discussed risks on details. I called her neuro-ophthalmologist at last visit who agreed with trial of MTX (she suspects pseudo-sarcoidosis or sarcoid like disease but no granuloma and ACE not elevated).     Unfortunately despite all my efforts to explain risks vs benefits (more than risks) of MTX as steroid sparing gent, Janine declined to try MTX. I was very concerned about her R orbital inflammatory mass as there is high chance of flare up off steroids and steroid causes her weigh gain and uncontrolled diabetes. I even offered her other steroid sparing gents like imuran. She declined that as well.    Her inflammation around R orbit has recurred now. On 4/7/2017, I spent quite amount of time discussing a trial of MTX. After discussing risks/benefits, she agreed to try it.     She is s/p Tx with MTX 10 mg po qwk 4/2017-5/2017. No benefits and more GI SEs, more hair loss and headaches since start of MTX. No benefits. I called and spoke with her neuro-ophthalmologist who recommended a second opinion from neuro-ophthalmology at the . I suspect her presentation to be ANCA vasculitis related but wanted to repeat imaging and get neuro-ophthalmology eval here. She was recommended to have repeat biopsy to r/o lymphoma.    In 5/2017, prescribed her prednisone 40-30-20-10 mg a day each for 3 days then stop (she declined higher dose and longer taper despite my concern for worsening swelling around orbit), Her R campos-orbital edema significantly improved. MTX was stopped in 5/2017 given side effects. Plan was to start imuran 50 mg po qd (NL  TPMT); however we decided to hold off till she gets biopsy done.    Repeat R lacrimal gland biopsy in 8/2017 showed non specific inflammation, it ruled out lymphoma. Her R orbital swelling is improved but still present. After her biopsy I contacted her to schedule a f/u visit with me to discuss plan of care but she declined till today's visit.    She did not tolerate AZA because of GI SEs.    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     This is definitely an inflammatory process and is steroid responsive, she had local kenalog inj in 8/2017 at the time of biopsy which has helped. Given her diabetes and long term SE of prednisone, highly recommend steroid sparing agent to prevent progression/recurrence of R campos-orbital swelling. Since she did not tolerate MTX and AZA, recommend rituximab as next step.     In 2/2018, I spent 30 minutes discussing test results, plan of care, rituximab SEs including rare risk of PML. She declined rituximab with concern for SEs.    Unfortunately lost f/u sine 2/2018. In 9/2018, was back with her R eye being much worse, swelling around R orbit was severe and is pushing down her eye. 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    Janine is frustrated with her eye condition, saying nobody told her that she has GPA or Wegener's which is a serious condition and people could die from it.    I reminded her that I discussed the Dx of ANCA-vasculitis which is just another name for Wegener's with her many times but she always declined induction Tx rituximab at last visit in 9/2018. I put her on prednisone 30 mg every day in 9/2018, now she is off, stopped 6 wk after surgery. Does not like SEs including worsening BG.    She is s/p  lateral orbitotomy and debulking orbital mass right eye on 9/26/18 with Dr. Shah and Dr. Valdez at [facility]. 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 Not happy with results, on my exam, her eye swelling/pain significantly improved. She wants to transfer care to here, I advised her to make an appointment with Dr. Saulo GREEN for f/u and referred her to Dr. Vasquez to assess if she has sinus involvement by GPA given facial pain.    After my original recommendation to receive rituximab (FDA approved for GPA) in 2/2018. She finally agreed to it, had 1 gr q2wk x 2 on 12/12/2018 and 12/26/2018. Had minor allergic reaction which was managed by extra dose of steroid and benadryl. She refuses to do prednisone taper given h/o diabetes, GIB on prednisone. I told her it would take 1 mo for rituximab to show benefit, if she continues to have active disease, recommend trial of cytoxan. Will check labs next week.      CT chest/abdomen/pelvis 4/2017 was neg for malignancy. 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 Repeat MPO was positive in 6/2017.    Continue accupuncture (referral was placed as it helps with chronic pain).     My impression is that her arthralgias are a combination of IA, fibromyalgia and chronic pain.  Stopped HCQ at last visit, shortly after resumed taking it as arthralgia recurred. Continue HCQ. Eye exam is updated.      2-Fad pads. She was seen by endocrinology and cushing was ruled out in 4/2014. Was advised to do f/u for enlarged thyroid/thyroid nodules.    3-Hair loss. F/U with dermatology    4-Chronic pain. F/U with pain clinic    5-Concerns of subclinical hyperthyroidism: TSH is barely minimal, chart review shows that those lowest levels are since April 2014. At last visit, discussed Endocrinology ref which pt wants to hold off in this visit.     6-Vit D deficiency. Was replaced with vit D 50, 000 units po qwk x 12 wk then 2000 units qd      PLAN: Follow up 3/6 add on    MEDICATION CHANGES: none      Orders Placed  This Encounter   Procedures     AST     ALT     Albumin level     CBC with platelets differential     Creatinine     CRP inflammation     Erythrocyte sedimentation rate auto     Routine UA with Micro Reflex to Culture     Protein  random urine with Creat Ratio     Creatinine random urine     ANCA IgG by IFA with Reflex to Titer     CD19 B Cell Count     Hemoglobin A1c     Myeloperoxidase Antibody IgG     Proteinase 3 Antibody IgG     OTOLARYNGOLOGY REFERRAL     ACUPUNCTURE REFERRAL         HPI      ROS      Physical Exam    Majo Mckinnon MD

## 2019-01-11 NOTE — NURSING NOTE
"Chief Complaint   Patient presents with     RECHECK     GAL      Medication Refill     compression stockings   Vital signs:  Temp: 98.9  F (37.2  C) Temp src: Oral BP: 118/77 Pulse: 86     SpO2: 98 %       Weight: 75.9 kg (167 lb 6.4 oz)  Estimated body mass index is 29.65 kg/m  as calculated from the following:    Height as of 9/7/18: 1.6 m (5' 3\").    Weight as of this encounter: 75.9 kg (167 lb 6.4 oz).          Toñito Samuels, CMA    "

## 2019-01-11 NOTE — PROGRESS NOTES
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 Cornell is a 49 yo AAF who was referred to our clinic for evaluation and management of her joint pain in setting of positive RF 26.    Her joint symptoms first started more than a year ago, but over last 6-8 months fluctuating some good and bad days . All her joints are involved. Over last 5 months, hands became swollen, warm and painful. Tylenol does not help. Ketoprofen did not help. Was told to avoid NSAIDs given ACS. Reports AM/PM stiffness x 2-3 hr, can't make full fist when wakes up in AM.    She feels tired all the time. Reports worsening hair loss and unchanged  facial rash. Gets red sore flaky rash over cheeks across her face which is intermittent diagnosed Rosacea and is prescribed metronidazole gel which she has not started using. Reports occasional oral ulcers. Hands feel cold with red discoloration last winter. Has occasional dysphagia to both solids and liquid. Has dry eyes, is using OTC allergy eyedrops. Has chronic abdominal pain. Has h/o pancreatitis. Sometime has anxiety. Occasionally has numbness, tingling in fingers/toes. Has back and spine issues, her whole back and neck hurts, different areas at different time. She is wondering if she has FMS. Has hard time sleeping, has never been diagnosed with BRENNA. She does not know if he snores. She was found to have slightly positive RF in 2/2013. Has microcytic anemia.     Her arthralgia gets worse with drop in temperature. Reports body ache. Activity makes her pain worse. Her joint swelling isintermittent. Her body pain and joint pain is the same. Reports AM stiffness x 3 hr.    She has been doing acupuncture x few months and it is helping with her pain. She thinks that the combination of plaquenil and acupuncture is helpful but overall she notice  30% in her symptoms relief.     Takes tylenol and tramadol on occasion for pain.    She decided to use different formulation of metformin which helped with her abdominal pain. I referred her to GI for evaluation of elevated lipase and abdominal pain. She decided to see GI in the future.       She is on  mg qd since 5/2014, tolerates it well and it helps her some. Denies taking any gabapentin due to chest pain and headches. She has had referral her for water aerobics, but she can't begin until she's healed from recent surgery in 10/2014. She thinks HCQ is helping but not enough to control all her pain, reports 2-3 hr of AM stiffness with ongoing diffuse arthralgia/myalgia along with intermittent swelling of hands. She does see her acupuncture person and it helps with her pain, has not started water aerobics yet. Has got her flu shot.       10/2015: Reports having pleuritic CP in 3/2015, was prescribed Lidoderm patches first. It did not help. Took prednisone (?dose) by her own, pain got better but it recurred off prednisone and gradually got worse to the point that she could not stand the pain anymore, was seen in ER in 5/2015, was treated with medrol dose pack which helped. Reports pain over hips, knees, ankles and fingers. Pain gets worse with walking. Reports myalgia, swelling of the arms. Pain over neck, shoulders. Has AM stiffness x 3-4 hr. Fingers swell up and become painful. Can't remember if steroid helped with joint pain. She had headaches, severe CP when she took tramadol and gabapentin and stopped taking them, sx resolved but she can't tell which one caused SEs but thinks that probably it was gabapentin. She has good days and tries to be more active but activity aggravates the pain. Headaches are intermittent. HCQ helps some not enough to control all the pain. No HCQ SEs. Had eye exam around July 2015. Flexeril helps but PCP wants to change flexeril to zanaflex, reporting that she is NOT comfortable  with the change. Acupuncture still helps an she continued to  do that. Concerned about fat pads she has in supraclavicular area, has h/o thyroid nodules. Continues having hair loss, takes iron for iron deficiency.     2/2016: She has 2 major complaints today, increased joint pain/swelling in hands/feet and recent Dx of optic neuritis in R eye, reports having similar problem about 20 yr ago, now recurred. Has a spot in R eye vision x long term, was seen by ophthalmology few days ago and was told to have optic neuritis. Gets joint pain, muscle pain. Can't  objects, hands are swollen. Knees, hips and ankles are painful. Reports more arthralgia. AM stiffness/ pain is 3-4 hr. She wants me to talk to her ophthalmologist directly.      She had botox inj for migraines which did not help her. She is going to have angiogram next wk, had to take more nitro for CP recently.       4/29/2016: She reports being on steroid taper x 3 since last visit. Reportedly, had orbit MRI, it showed swelling/inflamamtion of R lacrimal glands. She had painful swelling of her R eye, cause her headaches. Botox inj made her headaches worse. She is being dealing with this since 1/2016, with severe headaches and pain/swelling around R eye. Had biopsy in 3/2016. She is frustrated as prednisone causes her weight gain and her BG is difficult to control on it.    5/2016: At last visit, was prescribed MTX 10 mg po qwk to take along with FA 1 mg qd. She decided not to take MTX, is afraid of side effects, believes all these medications would harm her. She also believes that botox caused her inflammation around R eye. No major flare up of per-orbital inflamamtion since last visit but continues to have swelling around her R eye.      11/2016: Reports diffuse body ache, arthralgia, myalgia especially with weather change. No major flare up of campos-orbital inflammation but her face/around R eye swells up from time to time. Reports 2 episodes of CP over past  2 mo, nitro sometimes helps and sometimes does not help. She has asthma and costochondritis and she has hard time to distinguish the origin of pain. Sometimes knees and fingers swell up. Her stiffness is sometimes all day.    4/2017:  Reports having sinusitis since last visit. Her R eye is dry and is using eyedrops to keep it moist. Reports having pain in ears. Was treated with antibiotics by PCP, it got better then it got worse. Reports catching infections easily. Then started having pressure over eyes with pain/headaches (exact start date is unknown). Pain gradually got worse and became persistent. Had sinus CT.     4/7/2017: Having a flare up of swelling, pain around her R orbit. Has not tried MTX yet.      5/2017: On MTX 10 mg po qwk since 4/7/2017, reports increased stomach pain and nausea and new headaches since start of MTX and it's not helping. Pain/swelling around R orbit is worse.    6/2017: She finished prednisone taper prescribed at last visit, R campos-orbital swelling significantly improved. 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    2/2018: R campos-orbital swelling has improved. Repeat R lacrimal gland biopsy in 8/2017 showed non specific inflammation with no lymphoma. She is off steroid. Did not tolerate AZA due to N/V and abdominal pain.      Is back with recurrence of R campos-orbital sweling x past 2 months. Pain really got worse over past 3wk, requries       9/2018: Declined ritiximab at last visit, lost f/u since 2/2018. Went to [place] and was seen on 8/29 by Dr. 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 Reportedly he ordered labs and recommended surgery since the inflammation of the R eye is back and is pushing the eye down. Janine took some prednsione 30 mg every day few days a go for pain.    Today: She had lateral  orbitotomy and debulking orbital mass right eye on 9/26/18 with Dr. Shah and Dr. Valdez at [place]. 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 There were no complications according to the op note.    Janine finally agreed to receive rituximab for her GPA. She had it as 1 gr q2wk x 2 on 12/12/2018 and 12/26/2018. Had minor allergic reaction which was managed by IV solu cortef and benadryl. She is off prednisone about 6wk after surgery. Had bleeding ulcer from prednsione. Has pain over sinus, ears. Still has residual pain, swelling around her R eye is concerned about it. Wants to transfer her care to ophthalmology here as it's closer to her.    Her records were reviewed.        Component      Latest Ref Rng 2/28/2013 2/28/2013          12:14 PM 12:14 PM   WBC      4.0 - 11.0 10e9/L     RBC Count      3.8 - 5.2 10e12/L     Hemoglobin      11.7 - 15.7 g/dL     Hematocrit      35.0 - 47.0 %     MCV      78 - 100 fl     MCH      26.5 - 33.0 pg     MCHC      31.5 - 36.5 g/dL     RDW      10.0 - 15.0 %     Platelet Count      150 - 450 10e9/L     Specimen Description           Lyme Screen IgG and IgM           Vitamin D Deficiency screening      30 - 75 ug/L     Ferritin      10 - 300 ng/mL     Sed Rate      0 - 20 mm/h     ALLI Screen by EIA      <1.0     Rheumatoid Factor      0 - 14 IU/mL     CK Total      30 - 225 U/L  78   Uric Acid      2.5 - 7.5 mg/dL 6.7      Component      Latest Ref Rng 2/28/2013          12:14 PM   WBC      4.0 - 11.0 10e9/L    RBC Count      3.8 - 5.2 10e12/L    Hemoglobin      11.7 - 15.7 g/dL    Hematocrit      35.0 - 47.0 %    MCV      78 - 100 fl    MCH      26.5 - 33.0 pg    MCHC      31.5 - 36.5 g/dL    RDW      10.0 - 15.0 %    Platelet Count      150 - 450 10e9/L    Specimen Description       Serum   Lyme Screen IgG and IgM       Test value: <0.75....Interpretation: Negative....If you highly suspect Lyme . . .   Vitamin D Deficiency screening      30 - 75 ug/L     Ferritin      10 - 300 ng/mL    Sed Rate      0 - 20 mm/h    ALLI Screen by EIA      <1.0    Rheumatoid Factor      0 - 14 IU/mL    CK Total      30 - 225 U/L    Uric Acid      2.5 - 7.5 mg/dL      Component      Latest Ref Rng 2/28/2013 2/28/2013 2/28/2013 2/28/2013          12:14 PM 12:14 PM 12:14 PM 12:14 PM   WBC      4.0 - 11.0 10e9/L       RBC Count      3.8 - 5.2 10e12/L       Hemoglobin      11.7 - 15.7 g/dL       Hematocrit      35.0 - 47.0 %       MCV      78 - 100 fl       MCH      26.5 - 33.0 pg       MCHC      31.5 - 36.5 g/dL       RDW      10.0 - 15.0 %       Platelet Count      150 - 450 10e9/L       Specimen Description             Lyme Screen IgG and IgM             Vitamin D Deficiency screening      30 - 75 ug/L       Ferritin      10 - 300 ng/mL    10   Sed Rate      0 - 20 mm/h   23 (H)    ALLI Screen by EIA      <1.0  <1.0 . . .     Rheumatoid Factor      0 - 14 IU/mL 26 (H)      CK Total      30 - 225 U/L       Uric Acid      2.5 - 7.5 mg/dL         Component      Latest Ref Rn 2/28/2013 2/28/2013          12:14 PM 12:14 PM   WBC      4.0 - 11.0 10e9/L 14.1 (H)    RBC Count      3.8 - 5.2 10e12/L 4.55    Hemoglobin      11.7 - 15.7 g/dL 10.7 (L)    Hematocrit      35.0 - 47.0 % 33.3 (L)    MCV      78 - 100 fl 73 (L)    MCH      26.5 - 33.0 pg 23.5 (L)    MCHC      31.5 - 36.5 g/dL 32.1    RDW      10.0 - 15.0 % 18.1 (H)    Platelet Count      150 - 450 10e9/L 407    Specimen Description           Lyme Screen IgG and IgM           Vitamin D Deficiency screening      30 - 75 ug/L  32   Ferritin      10 - 300 ng/mL     Sed Rate      0 - 20 mm/h     ALLI Screen by EIA      <1.0     Rheumatoid Factor      0 - 14 IU/mL     CK Total      30 - 225 U/L     Uric Acid      2.5 - 7.5 mg/dL          MRI CERVICAL SPINE WITHOUT CONTRAST 4/3/2013 12:47 PM    HISTORY: Cervicalgia. Degeneration of cervical intervertebral disc.  MRI cervical spine. Evaluate bilateral supraclavicular lymph nodes.  Clinical  enlargement.    TECHNIQUE: Multiplanar multisequence MRI of the cervical spine  without gadolinium contrast.    COMPARISON: None.    FINDINGS: The patient has a developmentally small central canal.  Images of the cervical cord reveals small areas of T2 hyperintensity  within the cervical cord. There is a small area of high signal  intensity at the C1 level. There is also a small area of high signal  intensity at the C6-C7 level. The area of high signal at C6-C7 is  located at an area of central spinal stenosis. This may be due to  myelomalacia. The area of high signal at C1-C2 is indeterminate.    C2-C3: Normal disc, facet joints, spinal canal and neural foramina.    C3-C4: Normal disc, facet joints, spinal canal and neural foramina.    C4-C5: Normal disc, facet joints, spinal canal and neural foramina.    C5-C6: There is degeneration of the disc. There is a mild annular  disc bulge. The central canal is mild-moderately narrowed. The  residual AP diameter of the central spinal canal measures  approximately 9 mm.    C6-C7: There is degeneration of the disc. There is loss of disc space  height. There is a diffuse annular disc bulge. There are associated  posterior osteophytes. There are severe central spinal stenosis at  this level. The residual AP diameter of the central spinal canal is  only about 6 mm. There is significant flattening of the cord. There  is high signal in the cord at this level consistent with  myelomalacia. There is moderate to severe right foraminal stenosis  due to and uncinate spur.    C7-T1: Normal disc, facet joints, spinal canal and neural foramina.            Result Impression       IMPRESSION:  1. Severe central spinal stenosis at C6-C7 due to a developmentally  small canal and due to a bulging disc and associated posterior  osteophyte. There is flattening of the cord at this level and high  signal in the cord at this level consistent with myelomalacia.  2. There is a small, indeterminate  2-3 mm area of high signal  intensity in the cord at the C1 level. This could be due to gliosis  secondary to a previous infectious or inflammatory process.  Demyelinating disease could also have a similar appearance. Clinical  correlation suggested.    GAIL MORE MD     MRI LEFT UPPER EXTREMITY NON-JOINT WITHOUT CONTRAST, MRI RIGHT UPPER  EXTREMITY NON-JOINT WITHOUT CONTRAST April 3, 2013 1:32 PM    HISTORY: Cervicalgia. Degeneration of cervical intervertebral disc.    TECHNIQUE: Multiplanar, multisequence imaging of the brachial plexus  was performed without gadolinium contrast enhancement.    COMPARISON: None.    FINDINGS: No mass lesions are seen. No inflammatory process is  identified. The roots, trunks, branches, and divisions of both the  right and left brachial plexus appear normal. No adenopathy is seen.  The anterior scalene muscles and the middle scalene muscles appear  normal. Nodules are seen within the thyroid gland. The largest nodule  is seen in the left lobe of the thyroid. This measures about 1.8 cm  in diameter.            Result Impression       IMPRESSION:  1. Both the right and left brachial plexus regions appear normal.  2. Thyroid nodules. The largest nodule is in the left lobe of the  thyroid. It measures about 1.8 cm in diameter.       XR WRIST BILATERAL G/E 3 VIEWS    Narrative:        EXAMINATION:  1. Each hand 3 views  2. Each wrist 3 views     DATE: 5/1/2013     HISTORY: Arthropathy with concern for rheumatoid.     FINDINGS: 3 views of each hand and 3 views of each wrist are obtained  without prior for comparison. Alignment is normal. There is no  fracture or acute bone abnormality. No distinct erosions are seen.  Some spurring of the distal radial ulnar joint is noted on the right.       Impression:     IMPRESSION:  1. No evidence of an inflammatory arthropathy involving either hand  or wrist.     VIELKA GUEVARA MD         XR FOOT BILATERAL G/E 3 VIEWS    Narrative:        EXAMINATION:  1.  Each foot 3 views  2. Each ankle 3 views  3. Sacroiliac joint series     DATE: 5/1/2013     HISTORY: Arthropathy; concern for rheumatoid.     FINDINGS: No prior for comparison.     A frontal and bilateral oblique evaluation of the sacroiliac joints  shows no malalignment. Some patchy sclerosis is identified along the  central aspect of both sacroiliac joints, which is favored to be  degenerative. No distinct erosions are seen. The visualized portion  of the hip joint spaces appear maintained, with no erosive changes  identified. Mild endplate spurring is noted in the lower lumbar  spine.     3 views of each foot and 3 views of each ankle show no evidence of  acute fracture or dislocation. The metatarsal phalangeal,  tarsometatarsal and intertarsal joint spaces appear maintained. No  erosions are identified. There is no sign of acroosteolysis. The  ankle mortise and talar dome are intact bilaterally. Minimal spurring  is noted along the tip of the medial malleolus bilaterally.       Impression:     IMPRESSION:  1. Patchy sclerosis identified along the central aspect of both  sacroiliac joints, which is favored to be degenerative. No distinct  erosions are seen.  2. No evidence of an inflammatory arthropathy in either foot or ankle.     VIELKA GUEVARA MD     5/2013  CBC WITH PLATELETS DIFFERENTIAL       Component Value Range    WBC 11.3 (*) 4.0 - 11.0 10e9/L    RBC Count 4.56  3.8 - 5.2 10e12/L    Hemoglobin 11.1 (*) 11.7 - 15.7 g/dL    Hematocrit 34.3 (*) 35.0 - 47.0 %    MCV 75 (*) 78 - 100 fl    MCH 24.3 (*) 26.5 - 33.0 pg    MCHC 32.4  31.5 - 36.5 g/dL    RDW 16.1 (*) 10.0 - 15.0 %    Platelet Count 315  150 - 450 10e9/L    Diff Method Automated Method      % Neutrophils 71.6  40 - 75 %    % Lymphocytes 20.9  20 - 48 %    % Monocytes 4.3  0 - 12 %    % Eosinophils 2.8  0 - 6 %    % Basophils 0.2  0 - 2 %    % Immature Granulocytes 0.2  0 - 0.4 %    Absolute Neutrophil 8.1  1.6 - 8.3 10e9/L    Absolute Lymphocytes 2.4   0.8 - 5.3 10e9/L    Absolute Monoctyes 0.5  0.0 - 1.3 10e9/L    Absolute Eosinophils 0.3  0.0 - 0.7 10e9/L    Absolute Basophils 0.0  0.0 - 0.2 10e9/L    Abs Immature Granulocytes 0.0  0 - 0.03 10e9/L   AMYLASE       Component Value Range    Amylase 103  30 - 110 U/L   COMPREHENSIVE METABOLIC PANEL       Component Value Range    Sodium 144  133 - 144 mmol/L    Potassium 3.8  3.4 - 5.3 mmol/L    Chloride 105  94 - 109 mmol/L    Carbon Dioxide 23  20 - 32 mmol/L    Anion Gap 17  6 - 17 mmol/L    Glucose 173 (*) 60 - 99 mg/dL    Urea Nitrogen 13  5 - 24 mg/dL    Creatinine 0.83  0.52 - 1.04 mg/dL    GFR Estimate 74  >60 mL/min/1.7m2    GFR Estimate If Black 90  >60 mL/min/1.7m2    Calcium 9.7  8.5 - 10.4 mg/dL    Bilirubin Total 0.4  0.2 - 1.3 mg/dL    Albumin 4.3  3.9 - 5.1 g/dL    Protein Total 7.8  6.8 - 8.8 g/dL    Alkaline Phosphatase 66  40 - 150 U/L    ALT 36  0 - 50 U/L    AST 28  0 - 45 U/L   CK TOTAL       Component Value Range    CK Total 66  30 - 225 U/L   CRP INFLAMMATION       Component Value Range    CRP Inflammation 10.4 (*) 0.0 - 8.0 mg/L   LIPASE       Component Value Range    Lipase 353 (*) 20 - 250 U/L   ERYTHROCYTE SEDIMENTATION RATE AUTO       Component Value Range    Sed Rate 26 (*) 0 - 20 mm/h   ROUTINE UA WITH MICROSCOPIC REFLEX TO CULTURE       Component Value Range    Color Urine Yellow      Appearance Urine Slightly Cloudy      Glucose Urine 30 (*) NEG mg/dL    Bilirubin Urine Negative  NEG    Ketones Urine 5 (*) NEG mg/dL    Specific Gravity Urine 1.026  1.003 - 1.035    Blood Urine Trace (*) NEG    pH Urine 5.0  5.0 - 7.0 pH    Protein Albumin Urine 10 (*) NEG mg/dL    Urobilinogen mg/dL Normal  0.0 - 2.0 mg/dL    Nitrite Urine Negative  NEG    Leukocyte Esterase Urine Negative  NEG    Source Midstream Urine      WBC Urine 1  0 - 2 /HPF    RBC Urine 4 (*) 0 - 2 /HPF    Squamous Epithelial /HPF Urine <1  0 - 1 /HPF    Mucous Urine Present (*) NEG /LPF    Hyaline Casts 3 (*) 0 - 2 /LPF     Calcium Oxalate Moderate (*) NEG /HPF   COMPLEMENT C3       Component Value Range    Complement C3 143  76 - 169 mg/dL   COMPLEMENT C4       Component Value Range    Complement C4 31  15 - 50 mg/dL   HEPATITIS C ANTIBODY       Component Value Range    Hepatitis C Antibody Negative  NEG   CARDIOLIPIN ANTIBODY IGG AND IGM       Component Value Range    Cardiolipin IgG Marline    0 - 15.0 GPL    Value: <15.0      Interpretation:  Negative    Cardiolipin IgM Marline    0 - 12.5 MPL    Value: <12.5      Interpretation:  Negative   LUPUS PANEL       Component Value Range    Lupus Result    NEG    Value: Negative      (Note)      COMMENTS:      The INR is normal.      APTT is normal.  1:2 Mix is not indicated.      DRVVT Screen is normal.      Thrombin time is normal.      NEGATIVE TEST; A LUPUS ANTICOAGULANT WAS NOT DETECTED IN THIS      SPECIMEN WITHIN THE LIMITS OF THE TESTING REPERTOIRE.      If the clinical picture is strongly suggestive of an antiphospholipid      syndrome, recommend anticardiolipin and beta-2-glycoprotein (IgG and      IgM) antibody tests.      Leela Franks M.D.  [phone]      5/2/2013            INR =  0.93 N = 0.86-1.14  (No additional charge)      TT = 15.7 N = 13.0-19.0 sec  (No additional charge)            APTT'S:    Seconds      Reagent =  Stago LA      Normal  =  38      Patient  =  35      1:2 Mix  =  N/A      Reference =  31-43             DILUTE MADELINE VIPER VENOM TEST:      DRVVT Screen Ratio = 0.76 Normal = <1.21         IMMUNOGLOBULIN G SUBCLASSES       Component Value Range    IGG 1030  695 - 1620 mg/dL    IgG1 488  300 - 856 mg/dL    IgG2 325  158 - 761 mg/dL    IgG3 47  24 - 192 mg/dL    IgG4 18  11 - 86 mg/dL   SUNNY ANTIBODY PANEL       Component Value Range    Ribonucleic Protein IgG Antibody 0      Smith Antibody IgG 1      SSA (RO) Antibody IgG 4      SSB (LA) Antibody IgG 0      Scleroderma Antibody IgG 0     BETA 2 GLYCOPROTEIN ANTIBODIES IGG IGM       Component Value  Range    Beta-2-Glycopro IgG 1      Beta-2-Glycopro IgM 5     CYCLIC CIT PEPT IGG       Component Value Range    Cyclic Cit Pept IgG/IgA    <20 UNITS    Value: <20      Interpretation:  Negative   DNA DOUBLE STRANDED ANTIBODIES       Component Value Range    DNA-ds    0 - 29 IU/mL    Value: <15      Interpretation:  Negative       Component      Latest Ref Rng 3/11/2014   Sodium      133 - 144 mmol/L 137   Potassium      3.4 - 5.3 mmol/L 4.6   Chloride      94 - 109 mmol/L 97   Carbon Dioxide      20 - 32 mmol/L 20   Anion Gap      6 - 17 mmol/L 20 (H)   Glucose      60 - 99 mg/dL 243 (H)   Urea Nitrogen      5 - 24 mg/dL 35 (H)   Creatinine      0.52 - 1.04 mg/dL 1.47 (H)   GFR Estimate      >60 mL/min/1.7m2 38 (L)   GFR Estimate If Black      >60 mL/min/1.7m2 46 (L)   Calcium      8.5 - 10.4 mg/dL 9.7   Bilirubin Total      0.2 - 1.3 mg/dL 0.3   Albumin      3.9 - 5.1 g/dL 4.8   Protein Total      6.8 - 8.8 g/dL 7.9   Alkaline Phosphatase      40 - 150 U/L 73   ALT      0 - 50 U/L 35   AST      0 - 45 U/L 30   Color Urine       Yellow   Appearance Urine       Clear   Glucose Urine      NEG mg/dL 500 (A)   Bilirubin Urine      NEG Negative   Ketones Urine      NEG mg/dL Negative   Specific Gravity Urine      1.003 - 1.035 1.020   Blood Urine      NEG Negative   pH Urine      5.0 - 7.0 pH 5.5   Protein Albumin Urine      NEG mg/dL Negative   Urobilinogen Urine      0.2 - 1.0 EU/dL 0.2   Nitrite Urine      NEG Negative   Leukocyte Esterase Urine      NEG Negative   Source       Midstream Urine   WBC      4.0 - 11.0 10e9/L 14.0 (H)   RBC Count      3.8 - 5.2 10e12/L 5.02   Hemoglobin      11.7 - 15.7 g/dL 12.4   Hematocrit      35.0 - 47.0 % 37.1   MCV      78 - 100 fl 74 (L)   MCH      26.5 - 33.0 pg 24.7 (L)   MCHC      31.5 - 36.5 g/dL 33.4   RDW      10.0 - 15.0 % 15.3 (H)   Platelet Count      150 - 450 10e9/L 420       Component      Latest Ref Rng 3/25/2014   Sodium      133 - 144 mmol/L 137   Potassium       3.4 - 5.3 mmol/L 3.7   Chloride      94 - 109 mmol/L 100   Carbon Dioxide      20 - 32 mmol/L 21   Anion Gap      6 - 17 mmol/L 16   Glucose      60 - 99 mg/dL 214 (H)   Urea Nitrogen      5 - 24 mg/dL 20   Creatinine      0.52 - 1.04 mg/dL 1.02   GFR Estimate      >60 mL/min/1.7m2 58 (L)   GFR Estimate If Black      >60 mL/min/1.7m2 70   Calcium      8.5 - 10.4 mg/dL 9.5   Phosphorus      2.5 - 4.5 mg/dL 3.7   Albumin      3.9 - 5.1 g/dL 4.6     Component      Latest Ref Rng 4/30/2014   CRP Inflammation      0.0 - 8.0 mg/L 10.0 (H)   Sed Rate      0 - 20 mm/h 39 (H)   Rheumatoid Factor      <20 IU/mL <20   Glucose-6-PO4 Dehydrogenase       17.7     Component      Latest Ref Rng 6/11/2014 7/15/2014   WBC      4.0 - 11.0 10e9/L 11.0    RBC Count      3.8 - 5.2 10e12/L 4.74    Hemoglobin      11.7 - 15.7 g/dL 11.8    Hematocrit      35.0 - 47.0 % 36.1    MCV      78 - 100 fl 76 (L)    MCH      26.5 - 33.0 pg 24.9 (L)    MCHC      31.5 - 36.5 g/dL 32.7    RDW      10.0 - 15.0 % 13.9    Platelet Count      150 - 450 10e9/L 434    Diff Method       Automated Method    % Neutrophils       59.2    % Lymphocytes       31.6    % Monocytes       5.9    % Eosinophils       2.6    % Basophils       0.5    % Immature Granulocytes       0.2    Absolute Neutrophil      1.6 - 8.3 10e9/L 6.5    Absolute Lymphocytes      0.8 - 5.3 10e9/L 3.5    Absolute Monoctyes      0.0 - 1.3 10e9/L 0.7    Absolute Eosinophils      0.0 - 0.7 10e9/L 0.3    Absolute Basophils      0.0 - 0.2 10e9/L 0.1    Abs Immature Granulocytes      0 - 0.4 10e9/L 0.0    Sodium      133 - 144 mmol/L 138 140   Potassium      3.4 - 5.3 mmol/L 3.9 3.8   Chloride      94 - 109 mmol/L 97 103   Carbon Dioxide      20 - 32 mmol/L 26 22   Anion Gap      6 - 17 mmol/L 14.9 15   Glucose      60 - 99 mg/dL 111 (H) 163 (H)   Urea Nitrogen      5 - 24 mg/dL 28 (H) 15   Creatinine      0.52 - 1.04 mg/dL 1.10 (H) 0.99   GFR Estimate      >60 mL/min/1.7m2 53 (L) 60 (L)   GFR  Estimate If Black      >60 mL/min/1.7m2 64 72   Calcium      8.5 - 10.4 mg/dL 10.3 9.3   Bilirubin Total      0.2 - 1.3 mg/dL 0.6 0.2   Albumin      3.9 - 5.1 g/dL 5.1 4.2   Protein Total      6.8 - 8.8 g/dL 9.0 (H) 7.2   Alkaline Phosphatase      40 - 150 U/L 87 69   ALT      0 - 50 U/L 37 33   AST      0 - 45 U/L 40 26   Color Urine       Straw    Appearance Urine       Clear    Glucose Urine      NEG mg/dL Negative    Bilirubin Urine      NEG Negative    Ketones Urine      NEG mg/dL Negative    Specific Gravity Urine      1.003 - 1.035 1.005    Blood Urine      NEG Negative    pH Urine      5.0 - 7.0 pH 5.0    Protein Albumin Urine      NEG mg/dL Negative    Urobilinogen mg/dL      0.0 - 2.0 mg/dL Normal    Nitrite Urine      NEG Negative    Leukocyte Esterase Urine      NEG Negative    Source       Midstream Urine    Lipase      20 - 250 U/L 403 (H)    CRP Inflammation      0.0 - 8.0 mg/L <5.0    N-Terminal Pro Bnp      0 - 125 pg/mL  55   Hemoglobin A1C      4.3 - 6.0 %  7.0 (H)     Component      Latest Ref Rng 11/12/2014   Sodium      133 - 144 mmol/L 135   Potassium      3.4 - 5.3 mmol/L 3.8   Chloride      94 - 109 mmol/L 104   Carbon Dioxide      20 - 32 mmol/L 24   Anion Gap      3 - 14 mmol/L 7   Glucose      70 - 99 mg/dL 125 (H)   Urea Nitrogen      7 - 30 mg/dL 17   Creatinine      0.52 - 1.04 mg/dL 1.18 (H)   GFR Estimate      >60 mL/min/1.7m2 49 (L)   GFR Estimate If Black      >60 mL/min/1.7m2 59 (L)   Calcium      8.5 - 10.1 mg/dL 9.8   WBC      4.0 - 11.0 10e9/L 11.1 (H)   RBC Count      3.8 - 5.2 10e12/L 4.05   Hemoglobin      11.7 - 15.7 g/dL 9.8 (L)   Hematocrit      35.0 - 47.0 % 30.6 (L)   MCV      78 - 100 fl 76 (L)   MCH      26.5 - 33.0 pg 24.2 (L)   MCHC      31.5 - 36.5 g/dL 32.0   RDW      10.0 - 15.0 % 15.5 (H)   Platelet Count      150 - 450 10e9/L 484 (H)   CT CHEST PULMONARY EMBOLISM W CONTRAST 5/20/2015 4:57 PM  HISTORY: Pain. SOB. Elevated d-dimer.   TECHNIQUE: 65 mL Isovue 370.  Axial images with coronal  reconstructions.  COMPARISON: None.  FINDINGS: Calcified granuloma with surrounding scarring in the  posterolateral left upper lobe. Triangular shaped opacity at the right  lung base in the lateral right middle lobe could represent some  scarring, atelectasis or infiltrate. There is also some scarring or  atelectasis in the posteromedial right lung base. Lungs otherwise  clear.  The pulmonary arteries are well opacified. No CT evidence for acute  pulmonary embolus. No aortic dissection.  Diffuse fatty infiltration of the liver.  IMPRESSION  IMPRESSION:   1. No pulmonary embolus identified.  2. Small focus of atelectasis, infiltrate or scarring in the lateral  right middle lobe.  3. Old granulomatous disease.  4. Otherwise negative.  SILVERIO VAZQUEZ MD  Component      Latest Ref Rng 3/27/2015   WBC      4.0 - 11.0 10e9/L 11.8 (H)   RBC Count      3.8 - 5.2 10e12/L 4.53   Hemoglobin      11.7 - 15.7 g/dL 11.0 (L)   Hematocrit      35.0 - 47.0 % 33.8 (L)   MCV      78 - 100 fl 75 (L)   MCH      26.5 - 33.0 pg 24.3 (L)   MCHC      31.5 - 36.5 g/dL 32.5   RDW      10.0 - 15.0 % 15.4 (H)   Platelet Count      150 - 450 10e9/L 419   Diff Method       Automated Method   % Neutrophils       75.3   % Lymphocytes       17.4   % Monocytes       4.4   % Eosinophils       2.5   % Basophils       0.2   % Immature Granulocytes       0.2   Absolute Neutrophil      1.6 - 8.3 10e9/L 8.9 (H)   Absolute Lymphocytes      0.8 - 5.3 10e9/L 2.1   Absolute Monoctyes      0.0 - 1.3 10e9/L 0.5   Absolute Eosinophils      0.0 - 0.7 10e9/L 0.3   Absolute Basophils      0.0 - 0.2 10e9/L 0.0   Abs Immature Granulocytes      0 - 0.4 10e9/L 0.0   Creatinine      0.52 - 1.04 mg/dL 1.12 (H)   GFR Estimate      >60 mL/min/1.7m2 52 (L)   GFR Estimate If Black      >60 mL/min/1.7m2 63   Iron      35 - 180 ug/dL 25 (L)   Iron Binding Cap      240 - 430 ug/dL 346   Iron Saturation Index      15 - 46 % 7 (L)   Albumin      3.4 -  5.0 g/dL 4.0   ALT      0 - 50 U/L 24   AST      0 - 45 U/L 15   CRP Inflammation      0.0 - 8.0 mg/L 28.0 (H)   Sed Rate      0 - 20 mm/h 81 (H)   Rheumatoid Factor      <20 IU/mL <20   Vitamin D Deficiency screening      30 - 75 ug/L 44   Ferritin      8 - 252 ng/mL 34     Component      Latest Ref Rng 7/29/2015   Sodium      133 - 144 mmol/L 137   Potassium      3.4 - 5.3 mmol/L 3.8   Chloride      94 - 109 mmol/L 106   Carbon Dioxide      20 - 32 mmol/L 23   Anion Gap      3 - 14 mmol/L 8   Glucose      70 - 99 mg/dL 148 (H)   Urea Nitrogen      7 - 30 mg/dL 17   Creatinine      0.52 - 1.04 mg/dL 1.02   GFR Estimate      >60 mL/min/1.7m2 58 (L)   GFR Estimate If Black      >60 mL/min/1.7m2 70   Calcium      8.5 - 10.1 mg/dL 9.0   Bilirubin Total      0.2 - 1.3 mg/dL 0.5   Albumin      3.4 - 5.0 g/dL 4.2   Protein Total      6.8 - 8.8 g/dL 7.4   Alkaline Phosphatase      40 - 150 U/L 64   ALT      0 - 50 U/L 23   AST      0 - 45 U/L 19     Results for FAVIO MARTINEZ (MRN [id number]) as of 10/30/2015 17:00   Ref. Range 8/21/2012 09:06 5/22/2013 14:22 4/14/2014 12:06 9/11/2014 12:35 12/4/2014 16:38   TSH Latest Range: 0.40-4.00 mU/L 0.83 0.43 0.27 (L) 0.23 (L) 0.22 (L)     Component      Latest Ref Rng 10/30/2015   WBC      4.0 - 11.0 10e9/L 13.4 (H)   RBC Count      3.8 - 5.2 10e12/L 4.76   Hemoglobin      11.7 - 15.7 g/dL 12.4   Hematocrit      35.0 - 47.0 % 37.9   MCV      78 - 100 fl 80   MCH      26.5 - 33.0 pg 26.1 (L)   MCHC      31.5 - 36.5 g/dL 32.7   RDW      10.0 - 15.0 % 14.5   Platelet Count      150 - 450 10e9/L 324   Diff Method       Automated Method   % Neutrophils       67.7   % Lymphocytes       22.7   % Monocytes       6.3   % Eosinophils       2.7   % Basophils       0.4   % Immature Granulocytes       0.2   Absolute Neutrophil      1.6 - 8.3 10e9/L 9.1 (H)   Absolute Lymphocytes      0.8 - 5.3 10e9/L 3.1   Absolute Monoctyes      0.0 - 1.3 10e9/L 0.9   Absolute Eosinophils      0.0 - 0.7  10e9/L 0.4   Absolute Basophils      0.0 - 0.2 10e9/L 0.1   Abs Immature Granulocytes      0 - 0.4 10e9/L 0.0   Creatinine      0.52 - 1.04 mg/dL 1.21 (H)   GFR Estimate      >60 mL/min/1.7m2 47 (L)   GFR Estimate If Black      >60 mL/min/1.7m2 57 (L)   Iron      35 - 180 ug/dL 70   Iron Binding Cap      240 - 430 ug/dL 428   Iron Saturation Index      15 - 46 % 16   Albumin      3.4 - 5.0 g/dL 4.6   ALT      0 - 50 U/L 29   AST      0 - 45 U/L 21   CRP Inflammation      0.0 - 8.0 mg/L <2.9   Sed Rate      0 - 20 mm/h 8   Vitamin D Deficiency screening      20 - 75 ug/L 46   Ferritin      8 - 252 ng/mL 18   TSH      0.40 - 4.00 mU/L 0.49   T4 Free      0.76 - 1.46 ng/dL 1.06   Free T3      2.3 - 4.2 pg/mL 2.8     Component      Latest Ref Rng 11/17/2015   Testosterone Total      8 - 60 ng/dL 19   Sex Hormone Binding Globulin      30 - 135 nmol/L 32   Free Testosterone Calculated      0.11 - 0.58 ng/dL 0.34   Vitamin A       0.61   Retinol Palmitate       <0.02 . . .   Vitamin A Interp       Normal . . .   Thyroglobulin Antibody      <40 IU/mL <20   Thyroid Peroxidase Antibody      <35 IU/mL 31   DHEA Sulfate      35 - 430 ug/dL 101   Zinc       68     Component      Latest Ref Rng 1/27/2016   Sodium      133 - 144 mmol/L 135   Potassium      3.4 - 5.3 mmol/L 4.0   Chloride      94 - 109 mmol/L 104   Carbon Dioxide      20 - 32 mmol/L 24   Anion Gap      3 - 14 mmol/L 7   Glucose      70 - 99 mg/dL 88   Urea Nitrogen      7 - 30 mg/dL 20   Creatinine      0.52 - 1.04 mg/dL 1.13 (H)   GFR Estimate      >60 mL/min/1.7m2 51 (L)   GFR Estimate If Black      >60 mL/min/1.7m2 62   Calcium      8.5 - 10.1 mg/dL 9.4   Bilirubin Total      0.2 - 1.3 mg/dL 0.7   Albumin      3.4 - 5.0 g/dL 4.3   Protein Total      6.8 - 8.8 g/dL 7.7   Alkaline Phosphatase      40 - 150 U/L 66   ALT      0 - 50 U/L 24   AST      0 - 45 U/L 18   Cholesterol      <200 mg/dL 112   Triglycerides      <150 mg/dL 100   HDL Cholesterol      >49 mg/dL  "34 (L)   LDL Cholesterol Calculated      <100 mg/dL 58   Non HDL Cholesterol      <130 mg/dL 78   N-Terminal Pro Bnp      0 - 125 pg/mL 29   Hemoglobin A1C      4.3 - 6.0 % 7.0 (H)   Amylase      30 - 110 U/L 60   Lipase      73 - 393 U/L 394 (H)   Troponin I ES      0.000 - 0.045 ug/L <0.015 . . .     Component      Latest Ref Rng 2/24/2016   Angiotensin Converting Enzyme       <5 (L) . . .   Neutrophil Cytoplasmic IgG Antibody       <1:20 . . .   Sed Rate      0 - 20 mm/h 86 (H)     Copath Report      Patient Name: FAVIO MARTINEZ   MR#: [id number]   Specimen #: [id number]   Collected: 3/15/2016   Received: 3/15/2016   Reported: 3/17/2016 13:33   Ordering Phy(s): PARVEEN ENRIQUEZ     ORIGINAL REPORT FOLLOWS ADDENDUM  ADDENDUM     TO ORIGINAL REPORT   Status: Signed Out   Date Ordered:3/18/2016   Date Complete:3/18/2016   Date Reported:3/18/2016 12:06   Signed Out By: Marianne Godfrey MD     INTERPRETATION:   This addendum is done to incorporate the results of fungal (GMS) stains   done on the specimen.  Specimen is negative for fungal organisms.  The   original final diagnosis remains unchanged.     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  Negative for malignancy(Please   see microscopic description)     Electronically signed out by:     Marianne Godfrey MD     CLINICAL HISTORY:   right orbital mass     GROSS:   The specimen is received labeled \"right orbital biopsy\" and consists of   red-tan nodule measuring 1.5 x 0.9 x 0.6 cm with one smooth side and   opposite rough side consistent with periosteum.  The specimen is   bisected revealing homogenous pale tan fleshy cut surface.  Touch   preparations are prepared, air dried and fixed, portion of specimen is   submitted in RPMI for possible " lymphoma workup [facility],   ([place], [place], [place] ).  The remainder is entirely submitted.   (Dictated by: Marianne Godfrey MD 3/15/2016 04:45 PM)     MICROSCOPIC:     Specimen consists of portion of lacrimal gland with acute and chronic   inflammation.  Focal area of microabscess formation associated with   small areas of necrosis are also present.  Inflammation is seen   extending to the periorbital adipose tissue forming panniculitis.   Specimen is negative for malignancy.  Samples sent for immunophenotyping   to [facility], ([place], [place], [place] ) reveals no evidence   of monoclonality or aberrant antigen expression.  A GMS (fungal) stain   is pending and results will be reported as an addendum.     CPT Codes:   A: 76005-QZ3, 76682-RXJTD, SOH, 59901-OUMAO, 25041-CIKR     TESTING LAB LOCATION:   [address]  [id number] [phone]     COLLECTION SITE:   Client: [facility]   Location: [facility] (S)            Component      Latest Ref Rng 4/29/2016   Nucleated RBCs      0 /100 0   Absolute Neutrophil      1.6 - 8.3 10e9/L 8.9 (H)   Absolute Lymphocytes      0.8 - 5.3 10e9/L 3.2   Absolute Monocytes      0.0 - 1.3 10e9/L 0.8   Absolute Eosinophils      0.0 - 0.7 10e9/L 0.2   Absolute Basophils      0.0 - 0.2 10e9/L 0.1   Abs Immature Granulocytes      0 - 0.4 10e9/L 0.1   Absolute Nucleated RBC       0.0   IGG      695 - 1620 mg/dL 836   IgG1      300 - 856 mg/dL 280 (L)   IgG2      158 - 761 mg/dL 277   IgG3      24 - 192 mg/dL 35   IgG4      11 - 86 mg/dL 16   RNP Antibody IgG      0.0 - 0.9 AI <0.2 . . .   Smith SUNNY Antibody IgG      0.0 - 0.9 AI <0.2 . . .   SSA (Ro) (SUNNY) Antibody, IgG      0.0 - 0.9 AI <0.2 . . .   SSB (La) (SUNNY) Antibody, IgG      0.0 - 0.9 AI <0.2 . . .   Scleroderma Antibody Scl-70 SUNNY IgG      0.0 - 0.9 AI <0.2 . . .   Cholesterol      <200 mg/dL 154   Triglycerides      <150 mg/dL 220 (H)    HDL Cholesterol      >49 mg/dL 42 (L)   LDL Cholesterol Calculated      <100 mg/dL 67   Non HDL Cholesterol      <130 mg/dL 111   M Tuberculosis Result      NEG Negative   M Tuberculosis Antigen Value       0.00   Albumin      3.4 - 5.0 g/dL 4.3   ALT      0 - 50 U/L 30   AST      0 - 45 U/L 10   Complement C3      76 - 169 mg/dL 157   Complement C4      15 - 50 mg/dL 32   CRP Inflammation      0.0 - 8.0 mg/L <2.9   Sed Rate      0 - 20 mm/h 2   DNA-ds      <10 IU/mL 1   Cyclic Citrullinated Peptide Antibody, IgG      <7 U/mL 1   Rheumatoid Factor      <20 IU/mL <20   Proteinase 3 Antibody IgG      0.0 - 0.9 AI <0.2 . . .   Myeloperoxidase Antibody IgG      0.0 - 0.9 AI 2.5 (H)   Vitamin D Deficiency screening      20 - 75 ug/L 24   Hemoglobin A1C      4.3 - 6.0 % 7.9 (H)   ALLI by IFA IgG       1:40 (A) . . .     Component      Latest Ref Rng & Units 4/7/2017   WBC      4.0 - 11.0 10e9/L 11.3 (H)   RBC Count      3.8 - 5.2 10e12/L 4.77   Hemoglobin      11.7 - 15.7 g/dL 12.5   Hematocrit      35.0 - 47.0 % 38.7   MCV      78 - 100 fl 81   MCH      26.5 - 33.0 pg 26.2 (L)   MCHC      31.5 - 36.5 g/dL 32.3   RDW      10.0 - 15.0 % 13.2   Platelet Count      150 - 450 10e9/L 347   Diff Method       Automated Method   % Neutrophils      % 67.1   % Lymphocytes      % 22.9   % Monocytes      % 6.2   % Eosinophils      % 3.0   % Basophils      % 0.4   % Immature Granulocytes      % 0.4   Nucleated RBCs      0 /100 0   Absolute Neutrophil      1.6 - 8.3 10e9/L 7.5   Absolute Lymphocytes      0.8 - 5.3 10e9/L 2.6   Absolute Monocytes      0.0 - 1.3 10e9/L 0.7   Absolute Eosinophils      0.0 - 0.7 10e9/L 0.3   Absolute Basophils      0.0 - 0.2 10e9/L 0.1   Abs Immature Granulocytes      0 - 0.4 10e9/L 0.1   Absolute Nucleated RBC       0.0   IGG      695 - 1620 mg/dL 962   IgG1      300 - 856 mg/dL Test sent to [facility]. See ARMISC.   IgG2      158 - 761 mg/dL Test sent to [facility]. See ARMISC.   IgG3      24 - 192 mg/dL Test sent  to [facility]. See ARMISC.   IgG4      11 - 86 mg/dL Test sent to [facility]. See ARMISC.   Result       SEE NOTE . . .   Test Name       IGG SUBCLASSES   Send Outs Misc Test Code       11333   Send Outs Misc Test Specimen       Serum   Creatinine      0.52 - 1.04 mg/dL 1.20 (H)   GFR Estimate      >60 mL/min/1.7m2 47 (L)   GFR Estimate If Black      >60 mL/min/1.7m2 57 (L)   Creatinine Urine      mg/dL    Albumin Urine mg/L      mg/L    Albumin Urine mg/g Cr      0 - 25 mg/g Cr    Myeloperoxidase Antibody IgG      0.0 - 0.9 AI 2.9 (H)   Proteinase 3 Antibody IgG      0.0 - 0.9 AI <0.2 . . .   Neutrophil Cytoplasmic IgG Antibody       1:80 (A) . . .   Angiotensin Converting Enzyme       <5 (L) . . .   Lab Scanned Result       TPMT GENOTYPE-Scanned   Vitamin C       56   ALT      0 - 50 U/L 23   Albumin      3.4 - 5.0 g/dL 4.3   AST      0 - 45 U/L 18   CRP Inflammation      0.0 - 8.0 mg/L 3.7   Sed Rate      0 - 30 mm/h 17   Vitamin D Deficiency screening      20 - 75 ug/L 40   Hemoglobin A1C      4.3 - 6.0 %      Component      Latest Ref Rng & Units 4/26/2017   WBC      4.0 - 11.0 10e9/L 11.8 (H)   RBC Count      3.8 - 5.2 10e12/L 4.23   Hemoglobin      11.7 - 15.7 g/dL 11.2 (L)   Hematocrit      35.0 - 47.0 % 35.0   MCV      78 - 100 fl 83   MCH      26.5 - 33.0 pg 26.5   MCHC      31.5 - 36.5 g/dL 32.0   RDW      10.0 - 15.0 % 13.4   Platelet Count      150 - 450 10e9/L 304   Diff Method       Automated Method   % Neutrophils      % 66.2   % Lymphocytes      % 22.7   % Monocytes      % 6.5   % Eosinophils      % 3.9   % Basophils      % 0.4   % Immature Granulocytes      % 0.3   Nucleated RBCs      0 /100 0   Absolute Neutrophil      1.6 - 8.3 10e9/L 7.8   Absolute Lymphocytes      0.8 - 5.3 10e9/L 2.7   Absolute Monocytes      0.0 - 1.3 10e9/L 0.8   Absolute Eosinophils      0.0 - 0.7 10e9/L 0.5   Absolute Basophils      0.0 - 0.2 10e9/L 0.1   Abs Immature Granulocytes      0 - 0.4 10e9/L 0.0   Absolute Nucleated RBC        0.0   IGG      695 - 1620 mg/dL    IgG1      300 - 856 mg/dL    IgG2      158 - 761 mg/dL    IgG3      24 - 192 mg/dL    IgG4      11 - 86 mg/dL    Result          Test Name          Send Outs Misc Test Code          Send Outs Misc Test Specimen          Creatinine      0.52 - 1.04 mg/dL 1.24 (H)   GFR Estimate      >60 mL/min/1.7m2 46 (L)   GFR Estimate If Black      >60 mL/min/1.7m2 55 (L)   Creatinine Urine      mg/dL 121   Albumin Urine mg/L      mg/L <5   Albumin Urine mg/g Cr      0 - 25 mg/g Cr Unable to calculate due to low value   Myeloperoxidase Antibody IgG      0.0 - 0.9 AI    Proteinase 3 Antibody IgG      0.0 - 0.9 AI    Neutrophil Cytoplasmic IgG Antibody          Angiotensin Converting Enzyme          Lab Scanned Result          Vitamin C          ALT      0 - 50 U/L 25   Albumin      3.4 - 5.0 g/dL 4.1   AST      0 - 45 U/L 15   CRP Inflammation      0.0 - 8.0 mg/L    Sed Rate      0 - 30 mm/h    Vitamin D Deficiency screening      20 - 75 ug/L    Hemoglobin A1C      4.3 - 6.0 % 7.8 (H)   EXAMINATION: CT CHEST ABDOMEN PELVIS W/O CONTRAST, 4/7/2017 2:59 PM     TECHNIQUE: Helical CT images from the thoracic inlet through the  symphysis pubis were obtained without IV contrast.     COMPARISON: CT chest on 5/20/2015. CT abdomen pelvis on 6/11/2014     HISTORY: Granuloma of right orbit. Concern for malignancy, lymphoma.     Additional history from the EMR: 50-year-old female with rheumatoid  arthritis and fibromyalgia. Presented on April 2016 with new right  orbital inflammatory mass, biopsied showed panniculitis without  infection or malignancy. Some intermittent swelling around her right  orbit.     FINDINGS:     Chest: Cardiac size is not enlarged. No pericardial effusion.  Calcified subcentimeter mediastinal and left hilar lymph nodes. No  thoracic adenopathy. Coronary artery calcifications/stents.  Benign-appearing coarse calcifications in the thyroid, better  described on ultrasound thyroid from  9/13/2016.     Central airways are patent. No pneumothorax or pleural effusion.  Calcified pulmonary granuloma in the posterior left upper lobe,  benign. Small focus of subpleural fibrosis and cysts along the  anterior lateral right lower lobe (image 96 series 6).     Abdomen and pelvis: Cholecystectomy. The liver, adrenal glands,  kidneys, spleen, pancreas, stomach, duodenum are without focal  abnormality on these noncontrast images.     No abdominal aortic aneurysm. No suspicious adenopathy in the abdomen  or pelvis. Bladder is intact. Calcified fibroid off the uterine  fundus. IUD in the uterus.     No focal bowel wall thickening or obstruction. No free air or free  fluid. Appendix is normal. Small periumbilical hernia.     Bones and soft tissues: No suspicious osseous lesions. Unremarkable  superficial soft tissues.         IMPRESSION: No evidence of malignancy in the chest, abdomen, or  pelvis.        I have personally reviewed the examination and initial interpretation  and I agree with the findings.     CONNIEJUDSON BRICE      Component      Latest Ref Rng & Units 6/1/2017   WBC      4.0 - 11.0 10e9/L 12.0 (H)   RBC Count      3.8 - 5.2 10e12/L 5.18   Hemoglobin      11.7 - 15.7 g/dL 13.8   Hematocrit      35.0 - 47.0 % 41.0   MCV      78 - 100 fl 79   MCH      26.5 - 33.0 pg 26.6   MCHC      31.5 - 36.5 g/dL 33.7   RDW      10.0 - 15.0 % 14.8   Platelet Count      150 - 450 10e9/L 322   Diff Method       Automated Method   % Neutrophils      % 76.7   % Lymphocytes      % 16.5   % Monocytes      % 4.6   % Eosinophils      % 1.9   % Basophils      % 0.3   Absolute Neutrophil      1.6 - 8.3 10e9/L 9.2 (H)   Absolute Lymphocytes      0.8 - 5.3 10e9/L 2.0   Absolute Monocytes      0.0 - 1.3 10e9/L 0.6   Absolute Eosinophils      0.0 - 0.7 10e9/L 0.2   Absolute Basophils      0.0 - 0.2 10e9/L 0.0   Color Urine       Yellow   Appearance Urine       Clear   Glucose Urine      NEG mg/dL Negative   Bilirubin Urine       NEG Negative   Ketones Urine      NEG mg/dL Negative   Specific Gravity Urine      1.003 - 1.035 1.010   pH Urine      5.0 - 7.0 pH 5.5   Protein Albumin Urine      NEG mg/dL Negative   Urobilinogen Urine      0.2 - 1.0 EU/dL 0.2   Nitrite Urine      NEG Negative   Blood Urine      NEG Negative   Leukocyte Esterase Urine      NEG Negative   Source       Midstream Urine   WBC Urine      0 - 2 /HPF O - 2   RBC Urine      0 - 2 /HPF O - 2   Squamous Epithelial /LPF Urine      FEW /LPF Few   Bacteria Urine      NEG /HPF Few (A)   Creatinine      0.52 - 1.04 mg/dL 1.19 (H)   GFR Estimate      >60 mL/min/1.7m2 48 (L)   GFR Estimate If Black      >60 mL/min/1.7m2 58 (L)   Protein Random Urine      g/L <0.05   Protein Total Urine g/gr Creatinine      0 - 0.2 g/g Cr Unable to calculate due to low value   Myeloperoxidase Antibody IgG      0.0 - 0.9 AI 1.9 (H)   Proteinase 3 Antibody IgG      0.0 - 0.9 AI <0.2 . . .   ALT      0 - 50 U/L 29   AST      0 - 45 U/L 18   Albumin      3.4 - 5.0 g/dL 4.6   CRP Inflammation      0.0 - 8.0 mg/L 6.1   Sed Rate      0 - 30 mm/h 13   Creatinine Urine Random      mg/dL 54   Neutrophil Cytoplasmic IgG Antibody       <1:20 . . .   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      Comparison: MRI brain 5/29/2013      Technique:   Orbits: Axial and coronal T1-weighted, and coronal T2-weighted images  obtained without intravenous contrast. Post-intravenous contrast  (using gadolinium) sagittal FLAIR, were obtained with fat-saturation,  focused on the orbits.  Brain: Axial susceptibility-weighted and FLAIR sequences were obtained  of the whole brain without intravenous contrast, and postcontrast  axial T1-weighted images were obtained through  the whole brain.   Contrast: 7.5mL Gadavist     Findings:  New asymmetric enlargement of the right lacrimal gland and enhancement  of the right lacrimal gland with surrounding ill-defined crescentic T2  hyperintense signal and enhancement within the right superior  superotemporal orbit, predominantly involving the extraconal space  with slight intraconal extension. No definite associated restricted  diffusion. No discrete extraocular muscle enlargement. Normal  symmetric optic nerve signal. Cavernous sinuses and orbital apices are  normal. Globes are normal.     Whole brain images are symmetric minimal leukoaraiosis and generalized  parenchymal volume loss. Ventricles are not enlarged. No intracranial  hemorrhage, mass effect, midline shift or abnormal extra axial fluid  collection. No abnormal foci of intracranial enhancement or restricted  diffusion. Paranasal sinuses and mastoid air cells are clear.            Impression:    New abnormal enlargement of the right lacrimal gland with surrounding  ill-defined T2 hyperintense signal and enhancement centered in the  superotemporal right orbital fat, which may represent   infectious/inflammatory dacryadenitis, orbital pseudotumor, lymphoma,  carcinoma, sarcoidosis or IgG4 disease..     I have personally reviewed the examination and initial interpretation  and I agree with the findings.     PATRICIA BRANTLEY MD      Component      Latest Ref Rng & Units 6/1/2017   WBC      4.0 - 11.0 10e9/L 12.0 (H)   RBC Count      3.8 - 5.2 10e12/L 5.18   Hemoglobin      11.7 - 15.7 g/dL 13.8   Hematocrit      35.0 - 47.0 % 41.0   MCV      78 - 100 fl 79   MCH      26.5 - 33.0 pg 26.6   MCHC      31.5 - 36.5 g/dL 33.7   RDW      10.0 - 15.0 % 14.8   Platelet Count      150 - 450 10e9/L 322   Diff Method       Automated Method   % Neutrophils      % 76.7   % Lymphocytes      % 16.5   % Monocytes      % 4.6   % Eosinophils      % 1.9   % Basophils      % 0.3   Absolute Neutrophil       "1.6 - 8.3 10e9/L 9.2 (H)   Absolute Lymphocytes      0.8 - 5.3 10e9/L 2.0   Absolute Monocytes      0.0 - 1.3 10e9/L 0.6   Absolute Eosinophils      0.0 - 0.7 10e9/L 0.2   Absolute Basophils      0.0 - 0.2 10e9/L 0.0   Color Urine       Yellow   Appearance Urine       Clear   Glucose Urine      NEG mg/dL Negative   Bilirubin Urine      NEG Negative   Ketones Urine      NEG mg/dL Negative   Specific Gravity Urine      1.003 - 1.035 1.010   pH Urine      5.0 - 7.0 pH 5.5   Protein Albumin Urine      NEG mg/dL Negative   Urobilinogen Urine      0.2 - 1.0 EU/dL 0.2   Nitrite Urine      NEG Negative   Blood Urine      NEG Negative   Leukocyte Esterase Urine      NEG Negative   Source       Midstream Urine   WBC Urine      0 - 2 /HPF O - 2   RBC Urine      0 - 2 /HPF O - 2   Squamous Epithelial /LPF Urine      FEW /LPF Few   Bacteria Urine      NEG /HPF Few (A)   Creatinine      0.52 - 1.04 mg/dL 1.19 (H)   GFR Estimate      >60 mL/min/1.7m2 48 (L)   GFR Estimate If Black      >60 mL/min/1.7m2 58 (L)   Protein Random Urine      g/L <0.05   Protein Total Urine g/gr Creatinine      0 - 0.2 g/g Cr Unable to calculate due to low value   Myeloperoxidase Antibody IgG      0.0 - 0.9 AI 1.9 (H)   Proteinase 3 Antibody IgG      0.0 - 0.9 AI <0.2 . . .   ALT      0 - 50 U/L 29   AST      0 - 45 U/L 18   Albumin      3.4 - 5.0 g/dL 4.6   CRP Inflammation      0.0 - 8.0 mg/L 6.1   Sed Rate      0 - 30 mm/h 13   Creatinine Urine Random      mg/dL 54   Neutrophil Cytoplasmic IgG Antibody       <1:20 . . .   Creatinine Urine      mg/dL 54       Patient Name: FAVIO MARTINEZ   MR#: [id number]   Specimen #: [id number]   Collected: 8/4/2017   Received: 8/4/2017   Reported: 8/9/2017 16:19   Ordering Phy(s): CRISTHIAN FLORES     For improved result formatting, select 'View Enhanced Report Format'   under Linked Documents section.     SPECIMEN(S):   Eye, right lacrimal gland     FINAL DIAGNOSIS:   Eye, right, \"lacrimal gland\", biopsy   - Dense " fibrosis and patchy inflammation   - No residual lacrimal gland seen     COMMENT:   Sections show tissue involved by dense fibrosis and patchy inflammation.   There is no morphologic or immunohistochemical evidence of lymphoma. By   separate report, concurrent flow cytometry studies ([id number]),   performed at the [facility], show polytypic B cells and no   aberrant immunophenotype on T cells. Immunohistochemical stains for IgG   and IgG-4 were performed, which provide no support for IgG-4-related   disease. Some of these changes may be related to prior surgery. Sjögren   disease is not excluded. There is no evidence of malignancy. Dr. Max Conway has reviewed this case and concurs.     Electronically signed out by:     Antonella Beth M.D.   INDICATION:  Right eye edema. Reported history of right orbital biopsy yielding pathology consistent with idiopathic inflammation.    TECHNIQUE:  Noncontrast CT images were obtained through the brain and facial bones.    COMPARISON:  CT sinus 03/27/2017, MRI brain and orbits 02/15/2016.     FINDINGS:  CT facial bones:   Large soft tissue lesion centered within the lateral intraconal and extraconal right orbit has significantly increased in size compared to the CT dated 03/27/2017. The lesion is inseparable from the right superior, lateral, and inferior rectus muscles, as well as the right lacrimal gland. The lesion demonstrates extension posteriorly slightly proximal to the orbital apex, as well as extension into the medial orbit superiorly and anteriorly. Mass effect includes medial bowing of the optic nerve sheath complex and pronounced proptosis of the right globe.  No mass is identified in the right orbit.  Torus palatinus.   Minimal mucosal thickening in the ethmoid air cells. The remainder of the visualized paranasal sinuses are clear.  CT brain:  The ventricles and sulci within normal limits for patient age. No mass effect or midline shift. The  gray-white differentiation is maintained.  No acute intracranial hemorrhage or pathologic extra-axial fluid collection.  The calvarium is intact. The mastoid air cells are clear.    IMPRESSION:  1. Large soft tissue lesion centered within the lateral intraconal and extraconal right orbit has significantly increased in size compared to the CT dated 03/27/2017. The lesion is inseparable from the right lacrimal gland and rectus musculature. Mass effect includes medial bowing of the optic nerve sheath complex and pronounced proptosis of the right globe. Given provided history, findings may represent a progressive inflammatory etiology (pseudotumor). Other differential considerations, such as sarcoidosis or lymphoma, could also have this appearance.  2. No acute intracranial hemorrhage or intracranial mass effect.    Please note that all CT scans at this facility use dose modulation, iterative reconstruction, and/or weight-based dosing when appropriate to reduce radiation dose to as low as reasonably achievable.    Dictated by Charles Ward MD @ Jul 16 2018  3:54PM    Signed by Dr. Charles Ward @ Jul 16 2018  4:20PM    ROS:  A comprehensive ROS was done, positives are per HPI.        HISTORY REVIEW:  Past Medical History:   Diagnosis Date     Abnormal glandular Papanicolaou smear of cervix 1992     Allergic rhinitis     Allergy, airborne subst     Arthritis      ASCVD (arteriosclerotic cardiovascular disease)      Chronic pain      Chronic pancreatitis (H)     idiopathic, Tx: PPI, antioxidants, pancreatic enzymes     Common migraine      Coronary artery disease      Costochondritis      Difficulty in walking(719.7)      Dyspnea on exertion      Ectasia, mammary duct     [facility], persistent nipple discharge     Elevated fasting glucose      Gastro-oesophageal reflux disease      Hernia      History of angina      Hyperlipidemia LDL goal < 100      Hypertension goal BP (blood pressure) < 140/90      Essential hypertension     Iron deficiency anemia      Ischemic cardiomyopathy      Menorrhagia      Migraine headaches      Mild persistent asthma      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 Matias, neg w/u, Rx'd w/prednisone     Ventral hernia, unspecified, without mention of obstruction or gangrene 2012     Past Surgical History:   Procedure Laterality Date     C ECHO HEART XTHORACIC,COMPLETE, W/O DOPPLER  04    [facility] WNL, EF 60%     C/SECTION, LOW TRANSVERSE           CARDIAC SURGERY      cardiac stent- recent in 16; 4 other stents     DILATION AND CURETTAGE N/A 2016    Procedure: DILATION AND CURETTAGE;  Surgeon: Nahed Butler MD;  Location: [facility] OR     HC UGI ENDOSCOPY W EUS  08    Dr. Pastrana, possible chronic pancreatitis, fatty liver     HERNIA REPAIR  2012    done at [facility]     INSERT INTRAUTERINE DEVICE N/A 2016    Procedure: INSERT INTRAUTERINE DEVICE;  Surgeon: Nahed Butler MD;  Location: [facility] OR     INT UTERINE FIBRIOD EMBOLIZATION  10/29/2014     LAPAROSCOPIC CHOLECYSTECTOMY  08    Dr. Arnol GRUBBS TX, CERVICAL      s/p LEEP     ORBITOTOMY Right 3/15/2016    Procedure: ORBITOTOMY;  Surgeon: Myron Cyr MD;  Location: [facility]     ORBITOTOMY Right 2017    Procedure: ORBITOTOMY;  RIGHT  ORBITOTOMY AND BIOPSY;  Surgeon: Charis Holbrook MD;  Location:  [facility]     REPAIR PTOSIS Right 2017    Procedure: REPAIR PTOSIS;  RIGHT UPPER LID PTOSIS REPAIR;  Surgeon: Myron Cyr MD;  Location: [facility]     UPPER GI ENDOSCOPY  10/21/08    mild gastritis, Dr. Hidalgo     Family History   Problem Relation Age of Onset     Heart Disease Father 50        heart attack     Cerebrovascular Disease Father      Diabetes Father      Hypertension Father      Depression Father      C.A.D. Father      Hypertension Mother      Arthritis Mother      Heart Disease Mother         long qt syndrome     Thyroid Disease Mother      C.A.D. Mother      Heart Disease Brother 15        MI at 15, 16.      Diabetes Maternal Uncle      Hypertension Maternal Aunt      Hypertension Maternal Uncle      Arthritis Brother         he passed away, had severe arthritis at age 11     Arthritis Maternal Uncle      Eye Disorder Maternal Uncle         cataracts     Neurologic Disorder Sister         migraines     Neurologic Disorder Sister         migraines     Respiratory Son         asthma     Cerebrovascular Disease Maternal Uncle      C.A.D. Brother      Family History Negative Other         neg for RA, SLE     Unknown/Adopted No family hx of         unknown neurological issues in her family, mother was adopted     Skin Cancer No family hx of         No known family hx of skin cancer     Social History     Socioeconomic History     Marital status: Single     Spouse name: Not on file     Number of children: 1     Years of education: Not on file     Highest education level: Not on file   Social Needs     Financial resource strain: Not on file     Food insecurity - worry: Not on file     Food insecurity - inability: Not on file     Transportation needs - medical: Not on file     Transportation needs - non-medical: Not on file   Occupational History     Employer: NONE    Tobacco Use     Smoking status: Former Smoker      Packs/day: 0.20     Years: 1.00     Pack years: 0.20     Types: Cigarettes     Last attempt to quit: 2016     Years since quittin.9     Smokeless tobacco: Never Used   Substance and Sexual Activity     Alcohol use: No     Alcohol/week: 0.0 oz     Drug use: No     Sexual activity: Not Currently   Other Topics Concern     Parent/sibling w/ CABG, MI or angioplasty before 65F 55M? Yes   Social History Narrative    Balanced Diet - sometimes    Osteoporosis Prevention Measures - Dairy servings per day: 2 servings weekly    Regular Exercise -  Yes Describe walking 4 times a week    Dental Exam - NO    Seatbelts used - Yes    Self Breast Exam - Yes    Abuse: Current or Past (Physical, Sexual or Emotional)- No    Do you have any concerns about STD's -  No    Do you feel safe in your environment - No    Guns stored in the home - No    Sunscreen used - Yes    Lipids -  YES - Date:     Glucose -  YES - Date:     Eye Exam - YES - Date: one year ago    Colon Cancer Screening - No    Hemoccults - NO    Pap Test -  YES - Date: , remote history of LEEP    Mammography - YES - Date: last spring, would like to discuss, needs a referral to [facility]    DEXA - NO    Immunizations reviewed and up to date - Yes, last td given in  or      Patient Active Problem List   Diagnosis     Seasonal affective disorder (H)     Allergic rhinitis     PCOS (polycystic ovarian syndrome)     Moderate persistent asthma     Chronic pancreatitis (H)     Hypertension goal BP (blood pressure) < 140/90     Common migraine     NSTEMI (non-ST elevated myocardial infarction) (H)     Hyperlipidemia LDL goal <70     ASCVD (arteriosclerotic cardiovascular disease)     GERD (gastroesophageal reflux disease)     Ischemic cardiomyopathy     Hypertensive heart disease     Uterine leiomyoma     Iron deficiency anemia     Costochondritis     Vitamin D deficiency     Breast cancer screening     Spinal stenosis in cervical  region     Fibromyalgia     Seronegative rheumatoid arthritis (H)     Type 2 diabetes, HbA1C goal < 8% (H)     Type 2 diabetes mellitus with other specified complication (H)     Hyperlipidemia associated with type 2 diabetes mellitus (H)     Hypertension associated with diabetes (H)     Overweight with body mass index (BMI) 25.0-29.9     Tobacco use disorder     Telogen effluvium     CAD S/P percutaneous coronary angioplasty     Status post coronary angiogram     ANCA-associated vasculitis (H)     Wegener's vasculitis (H)       Pregnancy Hx: She is . All misscarriages were in first trimester. H/o OC use in the past. 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    PMHx, FHx, SHx were reviewed, unchanged.      Outpatient Encounter Medications as of 2019   Medication Sig Dispense Refill     albuterol (PROAIR HFA, PROVENTIL HFA, VENTOLIN HFA) 108 (90 BASE) MCG/ACT inhaler Inhale 2 puffs into the lungs every 6 hours as needed for shortness of breath / dyspnea or wheezing 3 Inhaler 1     ASPIRIN LOW DOSE 81 MG EC tablet Take 1 tablet (81 mg) by mouth daily 90 tablet 3     BASAGLAR 100 UNIT/ML injection Inject 40 Units Subcutaneous daily 12 mL 2     blood glucose monitoring (NO BRAND SPECIFIED) meter device kit Use to test blood sugar 4 X times daily or as directed. 1 kit 0     blood glucose monitoring (NO BRAND SPECIFIED) test strip 1 strip by In Vitro route 4 times daily Test as directed. Please dispense three months and three months of refills. Thank you. 360 each 3     blood glucose monitoring (ONE TOUCH DELICA) lancets Use to test blood sugars 4 times daily or as directed. 4 Box 3     calcium carbonate (TUMS) 500 MG chewable tablet Take 3-4 chew tab by mouth daily as needed.       clopidogrel (PLAVIX) 75 MG tablet Take 1 tablet (75 mg) by mouth daily 90 tablet 2     cyclobenzaprine (FLEXERIL) 10 MG tablet Take 1 tablet (10 mg) by mouth 2 times daily as needed for muscle spasms 180 tablet 0      insulin pen needle (BD MARCK U/F) 32G X 4 MM USE DAILY OR AS DIRECTED 300 each 3     acetaminophen (TYLENOL) 325 MG tablet Take 1-2 tablets (325-650 mg) by mouth every 6 hours as needed for pain (headache) 250 tablet 0     albuterol (2.5 MG/3ML) 0.083% neb solution INL 1 VIAL VIA NEBULIZATION Q 4 TO 6 HOURS PRN  1     COMPRESSION STOCKINGS 2 each daily Apply one 10-15 mmHg compression stocking to each lower extgmierty during the day and remove before bedtime. (Patient not taking: Reported on 12/12/2018) 4 each 2     cycloSPORINE (RESTASIS) 0.05 % ophthalmic emulsion Place 1 drop into the right eye every 12 hours       desonide (DESOWEN) 0.05 % cream Apply topically 2 times daily       dicyclomine (BENTYL) 20 MG tablet TAKE 1 TABLET(20 MG) BY MOUTH FOUR TIMES DAILY AS NEEDED 60 tablet 3     diphenhydrAMINE (BENADRYL) 25 MG capsule TK 1 TO 2 CS PO QHS  4     EPIPEN 2-RIKY 0.3 MG/0.3ML injection INJECT 0.3 MG INTO THE MUSCLE PRF ANAPHYALAXIS  0     ferrous gluconate (FERGON) 324 (38 Fe) MG tablet Take 1 tablet (324 mg) by mouth 2 times daily (with meals) 180 tablet 3     fexofenadine (ALLEGRA) 180 MG tablet Take 1 tablet by mouth daily as needed. 90 tablet 3     fluocinolone (SYNALAR) 0.01 % solution Apply topically daily as needed       fluocinonide (LIDEX) 0.05 % external solution Apply topically 2 times daily To areas of itch on the scalp as needed. 60 mL 11     fluticasone-vilanterol (BREO ELLIPTA) 100-25 MCG/INH inhaler Inhale 1 puff into the lungs daily       folic acid (FOLVITE) 1 MG tablet Take 1 tablet by mouth daily 90 tablet 3     hydroxychloroquine (PLAQUENIL) 200 MG tablet Take 2 tablets (400 mg) by mouth daily 180 tablet 1     ketoconazole (NIZORAL) 2 % cream Apply topically as needed   3     ketoconazole (NIZORAL) 2 % shampoo Apply topically daily as needed       lidocaine (viscous), alum & mag hydroxide-simethicone GI Cocktail 30 ml times on po now (Patient not taking: Reported on 12/12/2018) 30 mL 0      lidocaine (XYLOCAINE) 5 % ointment Apply 1 g topically 2 times daily as needed for moderate pain (Patient not taking: Reported on 12/12/2018) 50 g 5     lisinopril-hydrochlorothiazide (PRINZIDE/ZESTORETIC) 20-25 MG tablet Take 1 tablet by mouth daily 90 tablet 2     Magnesium Oxide -Mg Supplement 250 MG TABS TK 1 T PO BID. REDUCE IF STOOLS LOOSEN  11     metFORMIN (GLUCOPHAGE-XR) 500 MG 24 hr tablet TAKE 2 TABLETS(1000 MG) BY MOUTH DAILY WITH DINNER 180 tablet 0     metoclopramide (REGLAN) 10 MG tablet Take 1 tablet (10 mg) by mouth 4 times daily (before meals and nightly) 90 tablet 0     metoprolol succinate ER (TOPROL-XL) 100 MG 24 hr tablet Take 1 tablet (100 mg) by mouth daily 90 tablet 2     metroNIDAZOLE (NORITATE) 1 % cream Apply topically daily       mometasone (NASONEX) 50 MCG/ACT spray Spray 2 sprays into both nostrils daily       montelukast (SINGULAIR) 10 MG tablet Take 1 tablet (10 mg) by mouth At Bedtime 30 tablet 1     Multiple Vitamin (DAILY-GERALD) TABS Take 1 tablet by mouth daily  0     nitroGLYcerin (NITROSTAT) 0.4 MG sublingual tablet Place 1 tablet (0.4 mg) under the tongue every 5 minutes as needed for chest pain 25 tablet 1     nystatin (MYCOSTATIN) 702949 UNITS TABS tablet TK 2 TS PO BID  0     olopatadine (PATANOL) 0.1 % ophthalmic solution Place 1 drop into both eyes 2 times daily as needed for allergies. (Patient not taking: Reported on 12/12/2018) 5 mL 12     ondansetron (ZOFRAN-ODT) 8 MG ODT tab Take 1 tablet (8 mg) by mouth every 8 hours as needed for nausea 60 tablet 1     Ondansetron HCl (ZOFRAN PO)        pantoprazole (PROTONIX) 40 MG EC tablet Take 1 tablet (40 mg) by mouth daily Pork free tablets. (Patient not taking: Reported on 12/26/2018) 30 tablet 1     pravastatin (PRAVACHOL) 40 MG tablet Take 1 tablet (40 mg) by mouth daily 90 tablet 2     ranitidine (ZANTAC) 150 MG tablet Take 1 tablet (150 mg) by mouth 2 times daily 180 tablet 1     spironolactone (ALDACTONE) 50 MG tablet  Take 1 tablet (50 mg) by mouth daily . Take additional 0.5 tablets by mouth once daily as needed for weight gain. 90 tablet 2     sucralfate (CARAFATE) 1 GM tablet Take 1 tablet (1 g) by mouth 4 times daily 120 tablet 3     SYMBICORT 80-4.5 MCG/ACT Inhaler INHALE 2 PUFFS PO BID RINSE AND EXPECTORATE AFTER  3     traMADol (ULTRAM) 50 MG tablet Take 1 tablet (50 mg) by mouth every 8 hours as needed for moderate pain 60 tablet 3     Triamcinolone Acetonide (NASACORT ALLERGY 24HR NA)        vitamin D (ERGOCALCIFEROL) 47416 UNIT capsule Take 1 capsule (50,000 Units) by mouth every 7 days Need a Vitamin D level in 2 months. (Patient taking differently: Take 50,000 Units by mouth every 7 days Need a Vitamin D level in 2 months.) 8 capsule 0     [DISCONTINUED] AEROSPAN 80 MCG/ACT AERS INHALE 2 PUFFS PO BID.  RINSE MOUTH AFTERWARDS  4     [DISCONTINUED] azelastine (ASTELIN) 0.1 % spray U 2 SPRAYS IEN BID  0     [DISCONTINUED] bacitracin ophthalmic ointment Apply to incision line three times daily. (Patient not taking: Reported on 12/12/2018) 3.5 g 0     [DISCONTINUED] COMPRESSION STOCKINGS 2 each daily (Patient not taking: Reported on 1/11/2019) 4 each 2     [DISCONTINUED] insulin glargine (LANTUS) 100 UNIT/ML injection Inject 40 Units Subcutaneous daily (with breakfast)       No facility-administered encounter medications on file as of 1/11/2019.        Allergies   Allergen Reactions     Amoxicillin-Pot Clavulanate      Augmentin      Unknown possible hives and edema     Azithromycin      Diatrizoate Other (See Comments)     Pt wants this listed because she is allergic to shellfish      Imitrex [Sumatriptan]      Severe face/neck/chest tightness and flushing side effects      Penicillins Hives     Unknown      Pork Allergy      Stomach pain, cramping, diarrhea, itching, nausea and headaches     Shellfish Allergy Hives and Swelling     Sulfa Drugs Hives and Swelling     Zithromax [Azithromycin Dihydrate] Swelling     Unknown           Ph.E:    Vitals:    01/11/19 1542   Weight: 75.9 kg (167 lb 6.4 oz)           Constitutional: WD/WN. Pleasant. In no acute distress.   Eyes: Swelling around R eye significantly improved since last visit. EOMI  HEENT: No oral ulcers or thrush. Normal salivary pool.  Neck: No cervical LAP, + thyromegaly  Chest: Clear to auscultation bilaterally  CV: RRR, no murmurs/ rubs or gallops. No edema, clubbing or cyanosis.   GI: Abdomen is soft and non tender.  MS: No synovitis or joint tenderness. Cool joints. No joint deformities. Full ROM of the joints. No nodules. +Fibromyalgia trigger points.  Skin: No livedo, periungual erythema, digital ulcers or nail changes. + alopecia with scales, facial malar erythema (looks like seborrhoic dermatitis).  Neuro: A&O x 3. Grossly non focal, muscular power 5/5 in all ext  Psych: NL affect and mood    Assessment/ plan:    1-Seropositive non-erosive RA (arthritis, AM stiffness, high CRP, RF 26 but re-check neg 3/2015 on HCQ) Dx 5/2013, FMS, new pleuritic CP 3/20-15-5/2015 resolved on steroids. GPA. She is on  mg bid since 5/2014. She tolerates it well and it's helping some but not enough to control all her pain. Continues having flare ups of joint pain, swelling also has FMS. Joint sx are getting worse. Had high ESR/CRP in 3/2015 and new pleuritic CP between 3/2015-5/2015 which resolved after taking medrol dose pack prescribed 5/20/2015. Her pleuritic CP could be related to her RA. Then stopped tramadol as it blames it for recent episodes of CP.    In the past, MTX was recommended, she declined.    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 Etiology of mass unclear, but it's inflammatory and related to pt's underlying autoimmune disease (inflammatory arthritis, serositis). It is very possible that  this is related to ANCA vasculitis causing orbit pseudotumor given +MPO.    Her work up showed borderline + ALLI and slightly elevated MPO. I told her prednisone is not good for her diabetes and weight gain. Recommended a trial of MTX as next step. Discussed risks on details. I called her neuro-ophthalmologist at last visit who agreed with trial of MTX (she suspects pseudo-sarcoidosis or sarcoid like disease but no granuloma and ACE not elevated).     Unfortunately despite all my efforts to explain risks vs benefits (more than risks) of MTX as steroid sparing gent, Janine declined to try MTX. I was very concerned about her R orbital inflammatory mass as there is high chance of flare up off steroids and steroid causes her weigh gain and uncontrolled diabetes. I even offered her other steroid sparing gents like imuran. She declined that as well.    Her inflammation around R orbit has recurred now. On 4/7/2017, I spent quite amount of time discussing a trial of MTX. After discussing risks/benefits, she agreed to try it.     She is s/p Tx with MTX 10 mg po qwk 4/2017-5/2017. No benefits and more GI SEs, more hair loss and headaches since start of MTX. No benefits. I called and spoke with her neuro-ophthalmologist who recommended a second opinion from neuro-ophthalmology at the . I suspect her presentation to be ANCA vasculitis related but wanted to repeat imaging and get neuro-ophthalmology eval here. She was recommended to have repeat biopsy to r/o lymphoma.    In 5/2017, prescribed her prednisone 40-30-20-10 mg a day each for 3 days then stop (she declined higher dose and longer taper despite my concern for worsening swelling around orbit), Her R campos-orbital edema significantly improved. MTX was stopped in 5/2017 given side effects. Plan was to start imuran 50 mg po qd (NL TPMT); however we decided to hold off till she gets biopsy done.    Repeat R lacrimal gland biopsy in 8/2017 showed non specific inflammation, it  ruled out lymphoma. Her R orbital swelling is improved but still present. After her biopsy I contacted her to schedule a f/u visit with me to discuss plan of care but she declined till today's visit.    She did not tolerate AZA because of GI SEs.    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     This is definitely an inflammatory process and is steroid responsive, she had local kenalog inj in 8/2017 at the time of biopsy which has helped. Given her diabetes and long term SE of prednisone, highly recommend steroid sparing agent to prevent progression/recurrence of R campos-orbital swelling. Since she did not tolerate MTX and AZA, recommend rituximab as next step.     In 2/2018, I spent 30 minutes discussing test results, plan of care, rituximab SEs including rare risk of PML. She declined rituximab with concern for SEs.    Unfortunately lost f/u sine 2/2018. In 9/2018, was back with her R eye being much worse, swelling around R orbit was severe and is pushing down her eye. 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    Janine is frustrated with her eye condition, saying nobody told her that she has GPA or Wegener's which is a serious condition and people could die from it.    I reminded her that I discussed the Dx of ANCA-vasculitis which is just another name for Wegener's with her many times but she always declined induction Tx rituximab at last visit in 9/2018. I put her on prednisone 30 mg every day in 9/2018, now she is off, stopped 6 wk after surgery. Does not like SEs including worsening BG.    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 Shah and Dr. Valdez at [place]. 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 Not happy with  results, on my exam, her eye swelling/pain significantly improved. She wants to transfer care to here, I advised her to make an appointment with Dr. Saulo GREEN for f/u and referred her to Dr. Vasquez to assess if she has sinus involvement by GPA given facial pain.    After my original recommendation to receive rituximab (FDA approved for GPA) in 2/2018. She finally agreed to it, had 1 gr q2wk x 2 on 12/12/2018 and 12/26/2018. Had minor allergic reaction which was managed by extra dose of steroid and benadryl. She refuses to do prednisone taper given h/o diabetes, GIB on prednisone. I told her it would take 1 mo for rituximab to show benefit, if she continues to have active disease, recommend trial of cytoxan. Will check labs next week.      CT chest/abdomen/pelvis 4/2017 was neg for malignancy. 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 Repeat MPO was positive in 6/2017.    Continue accupuncture (referral was placed as it helps with chronic pain).     My impression is that her arthralgias are a combination of IA, fibromyalgia and chronic pain.  Stopped HCQ at last visit, shortly after resumed taking it as arthralgia recurred. Continue HCQ. Eye exam is updated.      2-Fad pads. She was seen by endocrinology and cushing was ruled out in 4/2014. Was advised to do f/u for enlarged thyroid/thyroid nodules.    3-Hair loss. F/U with dermatology    4-Chronic pain. F/U with pain clinic    5-Concerns of subclinical hyperthyroidism: TSH is barely minimal, chart review shows that those lowest levels are since April 2014. At last visit, discussed Endocrinology ref which pt wants to hold off in this visit.     6-Vit D deficiency. Was replaced with vit D 50, 000 units po qwk x 12 wk then 2000 units qd      PLAN: Follow up 3/6 add on    MEDICATION CHANGES: none      Orders Placed This Encounter   Procedures     AST     ALT     Albumin level     CBC with platelets differential     Creatinine     CRP inflammation      Erythrocyte sedimentation rate auto     Routine UA with Micro Reflex to Culture     Protein  random urine with Creat Ratio     Creatinine random urine     ANCA IgG by IFA with Reflex to Titer     CD19 B Cell Count     Hemoglobin A1c     Myeloperoxidase Antibody IgG     Proteinase 3 Antibody IgG     OTOLARYNGOLOGY REFERRAL     ACUPUNCTURE REFERRAL                           HPI      ROS      Physical Exam

## 2019-01-28 ENCOUNTER — TELEPHONE (OUTPATIENT)
Dept: RHEUMATOLOGY | Facility: CLINIC | Age: 54
End: 2019-01-28

## 2019-01-28 NOTE — TELEPHONE ENCOUNTER
Called patient to remind her that she has some labs that need to be completed.  She states she will try to get them  Completed with today or tomorrow.     She also had additional questions regarding referrals/wanted telephone number for ENT which I provided for her.    She would like some refills of her medications;  Flexeril and the Tramadol.    Tamra WOODS, RN  Rheumatology RN Care Coordinator  [facility]

## 2019-01-29 ENCOUNTER — TELEPHONE (OUTPATIENT)
Dept: OPHTHALMOLOGY | Facility: CLINIC | Age: 54
End: 2019-01-29

## 2019-01-29 NOTE — TELEPHONE ENCOUNTER
Left message for patient as on wait list and have some openings for Thursday 1/31 with Dr. Vernon.  Patient instructed to call if interested

## 2019-01-31 DIAGNOSIS — I10 HYPERTENSION GOAL BP (BLOOD PRESSURE) < 140/90: ICD-10-CM

## 2019-02-01 RX ORDER — SPIRONOLACTONE 50 MG/1
50 TABLET, FILM COATED ORAL DAILY
Qty: 90 TABLET | Refills: 2 | OUTPATIENT
Start: 2019-02-01

## 2019-02-01 NOTE — TELEPHONE ENCOUNTER
Patient has many refills available at her pharmacy, confirmed via phone by pharmacy. Left vm for patient that she can call her pharmacy for refills.

## 2019-02-13 ENCOUNTER — TELEPHONE (OUTPATIENT)
Dept: RHEUMATOLOGY | Facility: CLINIC | Age: 54
End: 2019-02-13

## 2019-02-13 NOTE — TELEPHONE ENCOUNTER
[facility] Call Center    Phone Message    May a detailed message be left on voicemail: yes    Reason for Call: Other: Pt called ENT to schedule with Dr. Sangeetha Vasquez from an order from Dr. Mckinnon.  Pt wants to let Dr. Mckinnon know that she couldnt get in with Dr. Vasquez until 05/06/19 and wants to know if Dr. Mckinnon will talk to Dr. Vasquez to get pt in sooner.  Pt would like a call back about this and pt stated it is ok to leave a detailed voicemail if she doesnt answer.  Pt doesnt feel she can wait until May to be seen, pt was put on a wait list     Action Taken: Message routed to:  [facility] ([facility]): Rheum

## 2019-02-14 NOTE — TELEPHONE ENCOUNTER
Majo Mckinnon MD Beard, Madeline, GEORGEI Cc: Sangeetha Vasquez MD   Caller: Unspecified (Yesterday,  3:20 PM)             Pt with GPA. Could see any ENT who has the 1st available appointment, they could communicate or consult Dr. Vasquez if needed.      Have called to see if I can get pt an earlier appt, am able to get her into see Dr. Oseguera on 3/16 at 1115.  Left message requesting that she return call to me.    Desiree Godinez RN  Rheumatology Clinic

## 2019-02-14 NOTE — TELEPHONE ENCOUNTER
Called and discussed with pt, Dr. Mckinnon's response below.  She states that she was referred to Dr. Vasquez, she wants to see Dr. Vasquez, she does not want to see another provider, if it will result in her care being transferred to Dr. Vasquez.  I did let her know that she would then need to wait for the next appt.  She said she may decide to take her care elsewhere then.  I did let her know that was her choice, but I could get her in to see another ENT next month.  She declined that appt.  Pt expressed frustration that she would have to wait that long.  I apologized but I can't get her in any sooner.    Desiree Godinez RN  Rheumatology Clinic

## 2019-02-26 ENCOUNTER — OFFICE VISIT (OUTPATIENT)
Dept: OPHTHALMOLOGY | Facility: CLINIC | Age: 54
End: 2019-02-26
Attending: OPHTHALMOLOGY
Payer: COMMERCIAL

## 2019-02-26 DIAGNOSIS — H53.40 VISUAL FIELD DEFECT: Primary | ICD-10-CM

## 2019-02-26 DIAGNOSIS — H04.123 DRY EYES, BILATERAL: ICD-10-CM

## 2019-02-26 DIAGNOSIS — H53.10 SUBJECTIVE VISUAL DISTURBANCE: Primary | ICD-10-CM

## 2019-02-26 DIAGNOSIS — I77.82 ANCA-ASSOCIATED VASCULITIS (H): ICD-10-CM

## 2019-02-26 DIAGNOSIS — H53.10 SUBJECTIVE VISUAL DISTURBANCE: ICD-10-CM

## 2019-02-26 PROCEDURE — G0463 HOSPITAL OUTPT CLINIC VISIT: HCPCS | Mod: ZF | Performed by: TECHNICIAN/TECHNOLOGIST

## 2019-02-26 PROCEDURE — 92083 EXTENDED VISUAL FIELD XM: CPT | Mod: ZF | Performed by: OPHTHALMOLOGY

## 2019-02-26 ASSESSMENT — REFRACTION_WEARINGRX
SPECS_TYPE: SVL
OS_SPHERE: -3.75
OD_SPHERE: -4.00
OD_AXIS: 167
OS_AXIS: 004
OS_CYLINDER: +1.25
OD_CYLINDER: +1.25

## 2019-02-26 ASSESSMENT — EXTERNAL EXAM - RIGHT EYE: OD_EXAM: NORMAL

## 2019-02-26 ASSESSMENT — TONOMETRY
OS_IOP_MMHG: 15
OD_IOP_MMHG: 16
IOP_METHOD: ICARE

## 2019-02-26 ASSESSMENT — VISUAL ACUITY
METHOD: SNELLEN - LINEAR
OS_CC: 20/20
OD_CC: 20/25
CORRECTION_TYPE: GLASSES

## 2019-02-26 ASSESSMENT — SLIT LAMP EXAM - LIDS: COMMENTS: NORMAL

## 2019-02-26 ASSESSMENT — CONF VISUAL FIELD: COMMENTS: GTOP

## 2019-02-26 ASSESSMENT — EXTERNAL EXAM - LEFT EYE: OS_EXAM: NORMAL

## 2019-02-26 ASSESSMENT — CUP TO DISC RATIO
OD_RATIO: 0.1
OS_RATIO: 0.2

## 2019-02-26 NOTE — LETTER
2019         RE:  :  MRN: Janine Cornell  1965  [id number]     Dear Dr. Mckinnon,    Thank you for asking me to see your very pleasant patient, Janine Cornell, in neuro-ophthalmic consultation.  I would like to thank you for sending your records and I have summarized them in the history of present illness. My assessment and plan are below.  For further details, please see my attached clinic note.      Assessment & Plan     Janine Cornell is a 53 year old female with the following diagnoses:   1. Visual field defect    2. Subjective visual disturbance    3. ANCA-associated vasculitis (H)       Initially seen by Dr. Vernon 2017. History of right orbital/lacrimal gland inflammation since . She has a positive pANCA and MPO and borderline Anti-nuclear antibody. Had biopsy done 2016 which showed inflammation and microabscesses, negative for malignancy. Had repeat biopsy + periorbital kenalog injection done with Dr. Holbrook 17: dense fibrosis with patchy inflammation. She was very unhappy with the surgery and how it made her eyelid droopy and half closed. She underwent right upper eyelid ptosis repair with Dr. Cyr on 2017.     Her right orbital inflammation has never went away. She is recently underwent another right orbital excision with Dr. Shah 18 - dense fibrosis with acute and chronic inflammation centered on small vessels with areas of microabscess formation and necrobiotic debris.     She has previously tried methotrexate but could not tolerate the side effects. She was previously on steroids. She states she had a bleeding ulcer.     She has started rituximab 2018 (managed by Dr. Mccoy). Currently not on steroids at this point.     She additional has sinus problems, right facial numbness/tingling. She has an appointment with Dr. Vasquez 2019. To see if there's sinus issues.  She feels that her left eye sees dimmer in dark conditions. She is also extremely light  sensitive. It does hurt her to move the right eye. There is soemtimes pain upon palpation of her right eye. She states that her right eyelids are swollen.     CT 7/2018:   IMPRESSION:  1. Large soft tissue lesion centered within the lateral intraconal and extraconal right orbit has significantly increased in size compared to the CT dated 03/27/2017. The lesion is inseparable from the right lacrimal gland and rectus musculature. Mass effect includes medial bowing of the optic nerve sheath complex and pronounced proptosis of the right globe. Given provided history, findings may represent a progressive inflammatory etiology (pseudotumor). Other differential considerations, such as sarcoidosis or lymphoma, could also have this appearance.  2. No acute intracranial hemorrhage or intracranial mass effect.     On examination, her visual acuity is 20/25 right eye and 20/20 left eye. There was no APD. She is proptotic right eye. She has mild periorbital edema. There no erythema. Anterior segment examination unremarkable. Funduscopic examination shows an elevated gliotic nerve. The left optic nerve is normal. RNFL OCT normal both eyes. GTOP shows nonspecific defects right eye.     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 Her symptoms have improved since few years ago but still symptomatic. She recently started rituxan 12/2018.  We will see how she responds to this. Currently, her visual function looks good and she denies double vision.  She would like tinted assessment for her photophobia.     RTC 6 months.     Again, thank you for allowing me to participate in the care of your patient.      Sincerely,    Jas Vernon MD  Professor  Ophthalmology Residency   Director of Neuro-Ophthalmology  [facility] [facility] [employer] Endowed Chair  Departments of Ophthalmology, Neurology, and Neurosurgery  [facility] [address] [place]  [address]  T - [phone]  F -  [phone]  DONNA Almazan [email or web address]    CC: Majo Mckinnon MD  [address]  [place] [address]  VIA In Basket     Charis Holbrook MD  [address]  [place] [address]  VIA In Basket     Sangeetha Vasquez MD  [address]  [place] [address]  VIA In Basket     Kash Solano MD  [address]  [place] [address]  VIA In Basket     Milan Peace MD  [address]  [place] [address]  VIA In Basket     Katia Pastor, APRN CNP  [address]  [facility] [address]  VIA In Basket       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

## 2019-02-26 NOTE — PROGRESS NOTES
Assessment & Plan     Janine Cornell is a 53 year old female with the following diagnoses:   1. Visual field defect    2. Subjective visual disturbance    3. ANCA-associated vasculitis (H)       Initially seen by Dr. Vernon 5/2017. History of right orbital/lacrimal gland inflammation since 2016. She has a positive pANCA and MPO and borderline Anti-nuclear antibody. Had biopsy done March of 2016 which showed inflammation and microabscesses, negative for malignancy. Had repeat biopsy + periorbital kenalog injection done with Dr. Holbrook 8/4/17: dense fibrosis with patchy inflammation. She was very unhappy with the surgery and how it made her eyelid droopy and half closed. She underwent right upper eyelid ptosis repair with Dr. Cyr on 11/2017.     Her right orbital inflammation has never went away. She is recently underwent another right orbital excision with Dr. Shah 9/26/18 - dense fibrosis with acute and chronic inflammation centered on small vessels with areas of microabscess formation and necrobiotic debris.     She has previously tried methotrexate but could not tolerate the side effects. She was previously on steroids. She states she had a bleeding ulcer.     She has started rituximab 12/2018 (managed by Dr. Mccoy). Currently not on steroids at this point.     She additional has sinus problems, right facial numbness/tingling. She has an appointment with Dr. Vasquez 5/2019. To see if there's sinus issues.  She feels that her left eye sees dimmer in dark conditions. She is also extremely light sensitive. It does hurt her to move the right eye. There is soemtimes pain upon palpation of her right eye. She states that her right eyelids are swollen.     CT 7/2018:        IMPRESSION:  1. Large soft tissue lesion centered within the lateral intraconal and extraconal right orbit has significantly increased in size compared to the CT dated 03/27/2017. The lesion is inseparable from the right lacrimal gland  and rectus musculature. Mass effect includes medial bowing of the optic nerve sheath complex and pronounced proptosis of the right globe. Given provided history, findings may represent a progressive inflammatory etiology (pseudotumor). Other differential considerations, such as sarcoidosis or lymphoma, could also have this appearance.  2. No acute intracranial hemorrhage or intracranial mass effect.     On examination, her visual acuity is 20/25 right eye and 20/20 left eye. There was no APD. She is proptotic right eye. She has mild periorbital edema. There no erythema. Anterior segment examination unremarkable. Funduscopic examination shows an elevated gliotic nerve. The left optic nerve is normal. RNFL OCT normal both eyes. GTOP shows nonspecific defects right eye.     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 Her symptoms have improved since few years ago but still symptomatic. She recently started rituxan 12/2018.  We will see how she responds to this. Currently, her visual function looks good and she denies double vision.  She would like tinted assessment for her photophobia.     RTC 6 months.               Attending Physician Attestation:  Complete documentation of historical and exam elements from today's encounter can be found in the full encounter summary report (not reduplicated in this progress note).  I personally obtained the chief complaint(s) and history of present illness.  I confirmed and edited as necessary the review of systems, past medical/surgical history, family history, social history, and examination findings as documented by others; and I examined the patient myself.  I personally reviewed the relevant tests, images, and reports as documented above.  I formulated and edited as necessary the assessment and plan and discussed the findings and management plan with the patient and family. - Jas Branch MD  Neuro-ophthalmology Fellow

## 2019-03-06 ENCOUNTER — ANCILLARY PROCEDURE (OUTPATIENT)
Dept: CT IMAGING | Facility: CLINIC | Age: 54
End: 2019-03-06
Attending: INTERNAL MEDICINE
Payer: COMMERCIAL

## 2019-03-06 ENCOUNTER — DOCUMENTATION ONLY (OUTPATIENT)
Dept: OTOLARYNGOLOGY | Facility: CLINIC | Age: 54
End: 2019-03-06

## 2019-03-06 ENCOUNTER — OFFICE VISIT (OUTPATIENT)
Dept: RHEUMATOLOGY | Facility: CLINIC | Age: 54
End: 2019-03-06
Attending: INTERNAL MEDICINE
Payer: COMMERCIAL

## 2019-03-06 VITALS
OXYGEN SATURATION: 100 % | BODY MASS INDEX: 30.3 KG/M2 | DIASTOLIC BLOOD PRESSURE: 81 MMHG | HEART RATE: 93 BPM | TEMPERATURE: 98.5 F | WEIGHT: 171 LBS | SYSTOLIC BLOOD PRESSURE: 134 MMHG | HEIGHT: 63 IN

## 2019-03-06 DIAGNOSIS — M79.7 FIBROMYALGIA: ICD-10-CM

## 2019-03-06 DIAGNOSIS — E55.9 VITAMIN D DEFICIENCY: ICD-10-CM

## 2019-03-06 DIAGNOSIS — G89.29 OTHER CHRONIC PAIN: ICD-10-CM

## 2019-03-06 DIAGNOSIS — E13.8 OTHER SPECIFIED DIABETES MELLITUS WITH COMPLICATION, WITH LONG-TERM CURRENT USE OF INSULIN: ICD-10-CM

## 2019-03-06 DIAGNOSIS — R68.89 COLD SWEAT: ICD-10-CM

## 2019-03-06 DIAGNOSIS — R68.89 COLD SWEAT: Primary | ICD-10-CM

## 2019-03-06 DIAGNOSIS — R51.9 NONINTRACTABLE HEADACHE, UNSPECIFIED CHRONICITY PATTERN, UNSPECIFIED HEADACHE TYPE: ICD-10-CM

## 2019-03-06 DIAGNOSIS — M31.30 WEGENER'S VASCULITIS: ICD-10-CM

## 2019-03-06 DIAGNOSIS — E11.9 DM TYPE 2, GOAL HBA1C 7%-8% (H): ICD-10-CM

## 2019-03-06 DIAGNOSIS — Z79.4 OTHER SPECIFIED DIABETES MELLITUS WITH COMPLICATION, WITH LONG-TERM CURRENT USE OF INSULIN: ICD-10-CM

## 2019-03-06 LAB
ALBUMIN SERPL-MCNC: 4.2 G/DL (ref 3.4–5)
ALBUMIN UR-MCNC: NEGATIVE MG/DL
ALT SERPL W P-5'-P-CCNC: 27 U/L (ref 0–50)
APPEARANCE UR: CLEAR
AST SERPL W P-5'-P-CCNC: 17 U/L (ref 0–45)
BASOPHILS # BLD AUTO: 0.1 10E9/L (ref 0–0.2)
BASOPHILS NFR BLD AUTO: 0.4 %
BILIRUB UR QL STRIP: NEGATIVE
COLOR UR AUTO: YELLOW
CREAT SERPL-MCNC: 0.99 MG/DL (ref 0.52–1.04)
CREAT UR-MCNC: 125 MG/DL
CREAT UR-MCNC: 125 MG/DL
CRP SERPL-MCNC: 7.1 MG/L (ref 0–8)
DIFFERENTIAL METHOD BLD: ABNORMAL
EOSINOPHIL # BLD AUTO: 0.3 10E9/L (ref 0–0.7)
EOSINOPHIL NFR BLD AUTO: 2.6 %
ERYTHROCYTE [DISTWIDTH] IN BLOOD BY AUTOMATED COUNT: 13.8 % (ref 10–15)
ERYTHROCYTE [SEDIMENTATION RATE] IN BLOOD BY WESTERGREN METHOD: 22 MM/H (ref 0–30)
FSH SERPL-ACNC: 14.3 IU/L
GFR SERPL CREATININE-BSD FRML MDRD: 65 ML/MIN/{1.73_M2}
GLUCOSE UR STRIP-MCNC: >499 MG/DL
HBA1C MFR BLD: 9.2 % (ref 0–5.6)
HCT VFR BLD AUTO: 39.4 % (ref 35–47)
HGB BLD-MCNC: 12.5 G/DL (ref 11.7–15.7)
HGB UR QL STRIP: NEGATIVE
IMM GRANULOCYTES # BLD: 0 10E9/L (ref 0–0.4)
IMM GRANULOCYTES NFR BLD: 0.3 %
KETONES UR STRIP-MCNC: NEGATIVE MG/DL
LEUKOCYTE ESTERASE UR QL STRIP: NEGATIVE
LYMPHOCYTES # BLD AUTO: 2.1 10E9/L (ref 0.8–5.3)
LYMPHOCYTES NFR BLD AUTO: 16.7 %
MCH RBC QN AUTO: 25.5 PG (ref 26.5–33)
MCHC RBC AUTO-ENTMCNC: 31.7 G/DL (ref 31.5–36.5)
MCV RBC AUTO: 80 FL (ref 78–100)
MONOCYTES # BLD AUTO: 0.8 10E9/L (ref 0–1.3)
MONOCYTES NFR BLD AUTO: 6.4 %
MUCOUS THREADS #/AREA URNS LPF: PRESENT /LPF
NEUTROPHILS # BLD AUTO: 9.2 10E9/L (ref 1.6–8.3)
NEUTROPHILS NFR BLD AUTO: 73.6 %
NITRATE UR QL: NEGATIVE
NRBC # BLD AUTO: 0 10*3/UL
NRBC BLD AUTO-RTO: 0 /100
PH UR STRIP: 5 PH (ref 5–7)
PLATELET # BLD AUTO: 351 10E9/L (ref 150–450)
PROT UR-MCNC: 0.13 G/L
PROT/CREAT 24H UR: 0.1 G/G CR (ref 0–0.2)
RBC # BLD AUTO: 4.9 10E12/L (ref 3.8–5.2)
RBC #/AREA URNS AUTO: 1 /HPF (ref 0–2)
SOURCE: ABNORMAL
SP GR UR STRIP: 1.01 (ref 1–1.03)
SQUAMOUS #/AREA URNS AUTO: <1 /HPF (ref 0–1)
T4 FREE SERPL-MCNC: 1 NG/DL (ref 0.76–1.46)
TSH SERPL DL<=0.005 MIU/L-ACNC: 0.25 MU/L (ref 0.4–4)
UROBILINOGEN UR STRIP-MCNC: 0 MG/DL (ref 0–2)
WBC # BLD AUTO: 12.5 10E9/L (ref 4–11)
WBC #/AREA URNS AUTO: <1 /HPF (ref 0–5)

## 2019-03-06 PROCEDURE — 82565 ASSAY OF CREATININE: CPT | Performed by: INTERNAL MEDICINE

## 2019-03-06 PROCEDURE — G0463 HOSPITAL OUTPT CLINIC VISIT: HCPCS | Mod: ZF

## 2019-03-06 PROCEDURE — 84439 ASSAY OF FREE THYROXINE: CPT | Performed by: INTERNAL MEDICINE

## 2019-03-06 PROCEDURE — 84460 ALANINE AMINO (ALT) (SGPT): CPT | Performed by: INTERNAL MEDICINE

## 2019-03-06 PROCEDURE — 83876 ASSAY MYELOPEROXIDASE: CPT | Performed by: INTERNAL MEDICINE

## 2019-03-06 PROCEDURE — 82306 VITAMIN D 25 HYDROXY: CPT | Performed by: INTERNAL MEDICINE

## 2019-03-06 PROCEDURE — 85025 COMPLETE CBC W/AUTO DIFF WBC: CPT | Performed by: INTERNAL MEDICINE

## 2019-03-06 PROCEDURE — 84450 TRANSFERASE (AST) (SGOT): CPT | Performed by: INTERNAL MEDICINE

## 2019-03-06 PROCEDURE — 36415 COLL VENOUS BLD VENIPUNCTURE: CPT | Performed by: INTERNAL MEDICINE

## 2019-03-06 PROCEDURE — 81001 URINALYSIS AUTO W/SCOPE: CPT | Performed by: INTERNAL MEDICINE

## 2019-03-06 PROCEDURE — 83001 ASSAY OF GONADOTROPIN (FSH): CPT | Performed by: INTERNAL MEDICINE

## 2019-03-06 PROCEDURE — 83036 HEMOGLOBIN GLYCOSYLATED A1C: CPT | Performed by: INTERNAL MEDICINE

## 2019-03-06 PROCEDURE — 84156 ASSAY OF PROTEIN URINE: CPT | Performed by: INTERNAL MEDICINE

## 2019-03-06 PROCEDURE — 86255 FLUORESCENT ANTIBODY SCREEN: CPT | Performed by: INTERNAL MEDICINE

## 2019-03-06 PROCEDURE — 83516 IMMUNOASSAY NONANTIBODY: CPT | Performed by: INTERNAL MEDICINE

## 2019-03-06 PROCEDURE — 85652 RBC SED RATE AUTOMATED: CPT | Performed by: INTERNAL MEDICINE

## 2019-03-06 PROCEDURE — 86355 B CELLS TOTAL COUNT: CPT | Performed by: INTERNAL MEDICINE

## 2019-03-06 PROCEDURE — 84443 ASSAY THYROID STIM HORMONE: CPT | Performed by: INTERNAL MEDICINE

## 2019-03-06 PROCEDURE — 86140 C-REACTIVE PROTEIN: CPT | Performed by: INTERNAL MEDICINE

## 2019-03-06 PROCEDURE — 82040 ASSAY OF SERUM ALBUMIN: CPT | Performed by: INTERNAL MEDICINE

## 2019-03-06 RX ORDER — DOXYCYCLINE HYCLATE 100 MG
TABLET ORAL
COMMUNITY
Start: 2019-03-05 | End: 2019-06-03

## 2019-03-06 RX ORDER — ERGOCALCIFEROL 1.25 MG/1
50000 CAPSULE, LIQUID FILLED ORAL
Qty: 16 CAPSULE | Refills: 0 | Status: CANCELLED | OUTPATIENT
Start: 2019-03-07

## 2019-03-06 RX ORDER — ACETAMINOPHEN 325 MG/1
325-650 TABLET ORAL EVERY 6 HOURS PRN
Qty: 250 TABLET | Refills: 0 | Status: SHIPPED | OUTPATIENT
Start: 2019-03-06 | End: 2019-07-31

## 2019-03-06 RX ORDER — METHYLPREDNISOLONE SODIUM SUCCINATE 125 MG/2ML
125 INJECTION, POWDER, LYOPHILIZED, FOR SOLUTION INTRAMUSCULAR; INTRAVENOUS ONCE
Status: CANCELLED | OUTPATIENT
Start: 2019-06-07

## 2019-03-06 RX ORDER — CYCLOBENZAPRINE HCL 10 MG
10 TABLET ORAL 2 TIMES DAILY PRN
Qty: 180 TABLET | Refills: 0 | Status: SHIPPED | OUTPATIENT
Start: 2019-03-06 | End: 2019-05-20

## 2019-03-06 RX ORDER — ACETAMINOPHEN 325 MG/1
650 TABLET ORAL ONCE
Status: CANCELLED
Start: 2019-06-07

## 2019-03-06 ASSESSMENT — PAIN SCALES - GENERAL: PAINLEVEL: SEVERE PAIN (6)

## 2019-03-06 ASSESSMENT — MIFFLIN-ST. JEOR: SCORE: 1354.78

## 2019-03-06 NOTE — LETTER
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 Cornell is a 49 yo AAF who was referred to our clinic for evaluation and management of her joint pain in setting of positive RF 26.    Her joint symptoms first started more than a year ago, but over last 6-8 months fluctuating some good and bad days . All her joints are involved. Over last 5 months, hands became swollen, warm and painful. Tylenol does not help. Ketoprofen did not help. Was told to avoid NSAIDs given ACS. Reports AM/PM stiffness x 2-3 hr, can't make full fist when wakes up in AM.    She feels tired all the time. Reports worsening hair loss and unchanged  facial rash. Gets red sore flaky rash over cheeks across her face which is intermittent diagnosed Rosacea and is prescribed metronidazole gel which she has not started using. Reports occasional oral ulcers. Hands feel cold with red discoloration last winter. Has occasional dysphagia to both solids and liquid. Has dry eyes, is using OTC allergy eyedrops. Has chronic abdominal pain. Has h/o pancreatitis. Sometime has anxiety. Occasionally has numbness, tingling in fingers/toes. Has back and spine issues, her whole back and neck hurts, different areas at different time. She is wondering if she has FMS. Has hard time sleeping, has never been diagnosed with BRENNA. She does not know if he snores. She was found to have slightly positive RF in 2/2013. Has microcytic anemia.     Her arthralgia gets worse with drop in temperature. Reports body ache. Activity makes her pain worse. Her joint swelling isintermittent. Her body pain and joint pain is the same. Reports AM stiffness x 3 hr.    She has been doing acupuncture x few months and it is helping with her pain. She thinks  that the combination of plaquenil and acupuncture is helpful but overall she notice 30% in her symptoms relief.     Takes tylenol and tramadol on occasion for pain.    She decided to use different formulation of metformin which helped with her abdominal pain. I referred her to GI for evaluation of elevated lipase and abdominal pain. She decided to see GI in the future.       She is on  mg qd since 5/2014, tolerates it well and it helps her some. Denies taking any gabapentin due to chest pain and headches. She has had referral her for water aerobics, but she can't begin until she's healed from recent surgery in 10/2014. She thinks HCQ is helping but not enough to control all her pain, reports 2-3 hr of AM stiffness with ongoing diffuse arthralgia/myalgia along with intermittent swelling of hands. She does see her acupuncture person and it helps with her pain, has not started water aerobics yet. Has got her flu shot.       10/2015: Reports having pleuritic CP in 3/2015, was prescribed Lidoderm patches first. It did not help. Took prednisone (?dose) by her own, pain got better but it recurred off prednisone and gradually got worse to the point that she could not stand the pain anymore, was seen in ER in 5/2015, was treated with medrol dose pack which helped. Reports pain over hips, knees, ankles and fingers. Pain gets worse with walking. Reports myalgia, swelling of the arms. Pain over neck, shoulders. Has AM stiffness x 3-4 hr. Fingers swell up and become painful. Can't remember if steroid helped with joint pain. She had headaches, severe CP when she took tramadol and gabapentin and stopped taking them, sx resolved but she can't tell which one caused SEs but thinks that probably it was gabapentin. She has good days and tries to be more active but activity aggravates the pain. Headaches are intermittent. HCQ helps some not enough to control all the pain. No HCQ SEs. Had eye exam around July 2015. Flexeril helps  but PCP wants to change flexeril to zanaflex, reporting that she is NOT comfortable with the change. Acupuncture still helps an she continued to  do that. Concerned about fat pads she has in supraclavicular area, has h/o thyroid nodules. Continues having hair loss, takes iron for iron deficiency.     2/2016: She has 2 major complaints today, increased joint pain/swelling in hands/feet and recent Dx of optic neuritis in R eye, reports having similar problem about 20 yr ago, now recurred. Has a spot in R eye vision x long term, was seen by ophthalmology few days ago and was told to have optic neuritis. Gets joint pain, muscle pain. Can't  objects, hands are swollen. Knees, hips and ankles are painful. Reports more arthralgia. AM stiffness/ pain is 3-4 hr. She wants me to talk to her ophthalmologist directly.      She had botox inj for migraines which did not help her. She is going to have angiogram next wk, had to take more nitro for CP recently.       4/29/2016: She reports being on steroid taper x 3 since last visit. Reportedly, had orbit MRI, it showed swelling/inflamamtion of R lacrimal glands. She had painful swelling of her R eye, cause her headaches. Botox inj made her headaches worse. She is being dealing with this since 1/2016, with severe headaches and pain/swelling around R eye. Had biopsy in 3/2016. She is frustrated as prednisone causes her weight gain and her BG is difficult to control on it.    5/2016: At last visit, was prescribed MTX 10 mg po qwk to take along with FA 1 mg qd. She decided not to take MTX, is afraid of side effects, believes all these medications would harm her. She also believes that botox caused her inflammation around R eye. No major flare up of per-orbital inflamamtion since last visit but continues to have swelling around her R eye.      11/2016: Reports diffuse body ache, arthralgia, myalgia especially with weather change. No major flare up of campos-orbital inflammation but  her face/around R eye swells up from time to time. Reports 2 episodes of CP over past 2 mo, nitro sometimes helps and sometimes does not help. She has asthma and costochondritis and she has hard time to distinguish the origin of pain. Sometimes knees and fingers swell up. Her stiffness is sometimes all day.    4/2017:  Reports having sinusitis since last visit. Her R eye is dry and is using eyedrops to keep it moist. Reports having pain in ears. Was treated with antibiotics by PCP, it got better then it got worse. Reports catching infections easily. Then started having pressure over eyes with pain/headaches (exact start date is unknown). Pain gradually got worse and became persistent. Had sinus CT.     4/7/2017: Having a flare up of swelling, pain around her R orbit. Has not tried MTX yet.      5/2017: On MTX 10 mg po qwk since 4/7/2017, reports increased stomach pain and nausea and new headaches since start of MTX and it's not helping. Pain/swelling around R orbit is worse.    6/2017: She finished prednisone taper prescribed at last visit, R campos-orbital swelling significantly improved. 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    2/2018: R campos-orbital swelling has improved. Repeat R lacrimal gland biopsy in 8/2017 showed non specific inflammation with no lymphoma. She is off steroid. Did not tolerate AZA due to N/V and abdominal pain.      Is back with recurrence of R campos-orbital sweling x past 2 months. Pain really got worse over past 3wk, requries       9/2018: Declined ritiximab at last visit, lost f/u since 2/2018. Went to [place] and was seen on 8/29 by Dr. 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 Reportedly he ordered labs and recommended surgery since the inflammation of the R eye is back and is pushing the eye down. Janine  took some prednsione 30 mg every day few days a go for pain.    Today: She had lateral orbitotomy and debulking orbital mass right eye on 9/26/18 with Dr. Shah and Dr. Valdez at [place]. 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 There were no complications according to the op note.    Janine finally agreed to receive rituximab for her GPA. She had it as 1 gr q2wk x 2 on 12/12/2018 and 12/26/2018. Had minor allergic reaction which was managed by IV solu cortef and benadryl. She is off prednisone about 6wk after surgery. Had bleeding ulcer from prednsione. Has pain over sinus, ears. Still has residual pain, swelling around her R eye is concerned about it. Wants to transfer her care to ophthalmology here as it's closer to her.    Cold induced sweating congestion joint pain    Facial swelling    allegy doctor clear ear pft ok doxy sinusitis    Her records were reviewed.        Component      Latest Ref Rng 2/28/2013 2/28/2013          12:14 PM 12:14 PM   WBC      4.0 - 11.0 10e9/L     RBC Count      3.8 - 5.2 10e12/L     Hemoglobin      11.7 - 15.7 g/dL     Hematocrit      35.0 - 47.0 %     MCV      78 - 100 fl     MCH      26.5 - 33.0 pg     MCHC      31.5 - 36.5 g/dL     RDW      10.0 - 15.0 %     Platelet Count      150 - 450 10e9/L     Specimen Description           Lyme Screen IgG and IgM           Vitamin D Deficiency screening      30 - 75 ug/L     Ferritin      10 - 300 ng/mL     Sed Rate      0 - 20 mm/h     ALLI Screen by EIA      <1.0     Rheumatoid Factor      0 - 14 IU/mL     CK Total      30 - 225 U/L  78   Uric Acid      2.5 - 7.5 mg/dL 6.7      Component      Latest Ref Rng 2/28/2013          12:14 PM   WBC      4.0 - 11.0 10e9/L    RBC Count      3.8 - 5.2 10e12/L    Hemoglobin      11.7 - 15.7 g/dL    Hematocrit      35.0 - 47.0 %    MCV      78 - 100 fl    MCH      26.5 - 33.0 pg    MCHC      31.5 - 36.5 g/dL    RDW      10.0 - 15.0 %    Platelet Count      150 - 450 10e9/L    Specimen  Description       Serum   Lyme Screen IgG and IgM       Test value: <0.75....Interpretation: Negative....If you highly suspect Lyme . . .   Vitamin D Deficiency screening      30 - 75 ug/L    Ferritin      10 - 300 ng/mL    Sed Rate      0 - 20 mm/h    ALLI Screen by EIA      <1.0    Rheumatoid Factor      0 - 14 IU/mL    CK Total      30 - 225 U/L    Uric Acid      2.5 - 7.5 mg/dL      Component      Latest Ref Rn 2/28/2013 2/28/2013 2/28/2013 2/28/2013          12:14 PM 12:14 PM 12:14 PM 12:14 PM   WBC      4.0 - 11.0 10e9/L       RBC Count      3.8 - 5.2 10e12/L       Hemoglobin      11.7 - 15.7 g/dL       Hematocrit      35.0 - 47.0 %       MCV      78 - 100 fl       MCH      26.5 - 33.0 pg       MCHC      31.5 - 36.5 g/dL       RDW      10.0 - 15.0 %       Platelet Count      150 - 450 10e9/L       Specimen Description             Lyme Screen IgG and IgM             Vitamin D Deficiency screening      30 - 75 ug/L       Ferritin      10 - 300 ng/mL    10   Sed Rate      0 - 20 mm/h   23 (H)    ALLI Screen by EIA      <1.0  <1.0 . . .     Rheumatoid Factor      0 - 14 IU/mL 26 (H)      CK Total      30 - 225 U/L       Uric Acid      2.5 - 7.5 mg/dL         Component      Latest Ref [facility] 2/28/2013 2/28/2013          12:14 PM 12:14 PM   WBC      4.0 - 11.0 10e9/L 14.1 (H)    RBC Count      3.8 - 5.2 10e12/L 4.55    Hemoglobin      11.7 - 15.7 g/dL 10.7 (L)    Hematocrit      35.0 - 47.0 % 33.3 (L)    MCV      78 - 100 fl 73 (L)    MCH      26.5 - 33.0 pg 23.5 (L)    MCHC      31.5 - 36.5 g/dL 32.1    RDW      10.0 - 15.0 % 18.1 (H)    Platelet Count      150 - 450 10e9/L 407    Specimen Description           Lyme Screen IgG and IgM           Vitamin D Deficiency screening      30 - 75 ug/L  32   Ferritin      10 - 300 ng/mL     Sed Rate      0 - 20 mm/h     ALLI Screen by EIA      <1.0     Rheumatoid Factor      0 - 14 IU/mL     CK Total      30 - 225 U/L     Uric Acid      2.5 - 7.5 mg/dL          MRI CERVICAL SPINE  WITHOUT CONTRAST 4/3/2013 12:47 PM    HISTORY: Cervicalgia. Degeneration of cervical intervertebral disc.  MRI cervical spine. Evaluate bilateral supraclavicular lymph nodes.  Clinical enlargement.    TECHNIQUE: Multiplanar multisequence MRI of the cervical spine  without gadolinium contrast.    COMPARISON: None.    FINDINGS: The patient has a developmentally small central canal.  Images of the cervical cord reveals small areas of T2 hyperintensity  within the cervical cord. There is a small area of high signal  intensity at the C1 level. There is also a small area of high signal  intensity at the C6-C7 level. The area of high signal at C6-C7 is  located at an area of central spinal stenosis. This may be due to  myelomalacia. The area of high signal at C1-C2 is indeterminate.    C2-C3: Normal disc, facet joints, spinal canal and neural foramina.    C3-C4: Normal disc, facet joints, spinal canal and neural foramina.    C4-C5: Normal disc, facet joints, spinal canal and neural foramina.    C5-C6: There is degeneration of the disc. There is a mild annular  disc bulge. The central canal is mild-moderately narrowed. The  residual AP diameter of the central spinal canal measures  approximately 9 mm.    C6-C7: There is degeneration of the disc. There is loss of disc space  height. There is a diffuse annular disc bulge. There are associated  posterior osteophytes. There are severe central spinal stenosis at  this level. The residual AP diameter of the central spinal canal is  only about 6 mm. There is significant flattening of the cord. There  is high signal in the cord at this level consistent with  myelomalacia. There is moderate to severe right foraminal stenosis  due to and uncinate spur.    C7-T1: Normal disc, facet joints, spinal canal and neural foramina.            Result Impression       IMPRESSION:  1. Severe central spinal stenosis at C6-C7 due to a developmentally  small canal and due to a bulging disc and  associated posterior  osteophyte. There is flattening of the cord at this level and high  signal in the cord at this level consistent with myelomalacia.  2. There is a small, indeterminate 2-3 mm area of high signal  intensity in the cord at the C1 level. This could be due to gliosis  secondary to a previous infectious or inflammatory process.  Demyelinating disease could also have a similar appearance. Clinical  correlation suggested.    GAIL MORE MD     MRI LEFT UPPER EXTREMITY NON-JOINT WITHOUT CONTRAST, MRI RIGHT UPPER  EXTREMITY NON-JOINT WITHOUT CONTRAST April 3, 2013 1:32 PM    HISTORY: Cervicalgia. Degeneration of cervical intervertebral disc.    TECHNIQUE: Multiplanar, multisequence imaging of the brachial plexus  was performed without gadolinium contrast enhancement.    COMPARISON: None.    FINDINGS: No mass lesions are seen. No inflammatory process is  identified. The roots, trunks, branches, and divisions of both the  right and left brachial plexus appear normal. No adenopathy is seen.  The anterior scalene muscles and the middle scalene muscles appear  normal. Nodules are seen within the thyroid gland. The largest nodule  is seen in the left lobe of the thyroid. This measures about 1.8 cm  in diameter.            Result Impression       IMPRESSION:  1. Both the right and left brachial plexus regions appear normal.  2. Thyroid nodules. The largest nodule is in the left lobe of the  thyroid. It measures about 1.8 cm in diameter.       XR WRIST BILATERAL G/E 3 VIEWS    Narrative:        EXAMINATION:  1. Each hand 3 views  2. Each wrist 3 views     DATE: 5/1/2013     HISTORY: Arthropathy with concern for rheumatoid.     FINDINGS: 3 views of each hand and 3 views of each wrist are obtained  without prior for comparison. Alignment is normal. There is no  fracture or acute bone abnormality. No distinct erosions are seen.  Some spurring of the distal radial ulnar joint is noted on the right.       Impression:      IMPRESSION:  1. No evidence of an inflammatory arthropathy involving either hand  or wrist.     VIELKA GUEVARA MD         XR FOOT BILATERAL G/E 3 VIEWS    Narrative:        EXAMINATION:  1. Each foot 3 views  2. Each ankle 3 views  3. Sacroiliac joint series     DATE: 5/1/2013     HISTORY: Arthropathy; concern for rheumatoid.     FINDINGS: No prior for comparison.     A frontal and bilateral oblique evaluation of the sacroiliac joints  shows no malalignment. Some patchy sclerosis is identified along the  central aspect of both sacroiliac joints, which is favored to be  degenerative. No distinct erosions are seen. The visualized portion  of the hip joint spaces appear maintained, with no erosive changes  identified. Mild endplate spurring is noted in the lower lumbar  spine.     3 views of each foot and 3 views of each ankle show no evidence of  acute fracture or dislocation. The metatarsal phalangeal,  tarsometatarsal and intertarsal joint spaces appear maintained. No  erosions are identified. There is no sign of acroosteolysis. The  ankle mortise and talar dome are intact bilaterally. Minimal spurring  is noted along the tip of the medial malleolus bilaterally.       Impression:     IMPRESSION:  1. Patchy sclerosis identified along the central aspect of both  sacroiliac joints, which is favored to be degenerative. No distinct  erosions are seen.  2. No evidence of an inflammatory arthropathy in either foot or ankle.     VIELKA GUEVARA MD     5/2013  CBC WITH PLATELETS DIFFERENTIAL       Component Value Range    WBC 11.3 (*) 4.0 - 11.0 10e9/L    RBC Count 4.56  3.8 - 5.2 10e12/L    Hemoglobin 11.1 (*) 11.7 - 15.7 g/dL    Hematocrit 34.3 (*) 35.0 - 47.0 %    MCV 75 (*) 78 - 100 fl    MCH 24.3 (*) 26.5 - 33.0 pg    MCHC 32.4  31.5 - 36.5 g/dL    RDW 16.1 (*) 10.0 - 15.0 %    Platelet Count 315  150 - 450 10e9/L    Diff Method Automated Method      % Neutrophils 71.6  40 - 75 %    % Lymphocytes 20.9  20 - 48 %    %  Monocytes 4.3  0 - 12 %    % Eosinophils 2.8  0 - 6 %    % Basophils 0.2  0 - 2 %    % Immature Granulocytes 0.2  0 - 0.4 %    Absolute Neutrophil 8.1  1.6 - 8.3 10e9/L    Absolute Lymphocytes 2.4  0.8 - 5.3 10e9/L    Absolute Monoctyes 0.5  0.0 - 1.3 10e9/L    Absolute Eosinophils 0.3  0.0 - 0.7 10e9/L    Absolute Basophils 0.0  0.0 - 0.2 10e9/L    Abs Immature Granulocytes 0.0  0 - 0.03 10e9/L   AMYLASE       Component Value Range    Amylase 103  30 - 110 U/L   COMPREHENSIVE METABOLIC PANEL       Component Value Range    Sodium 144  133 - 144 mmol/L    Potassium 3.8  3.4 - 5.3 mmol/L    Chloride 105  94 - 109 mmol/L    Carbon Dioxide 23  20 - 32 mmol/L    Anion Gap 17  6 - 17 mmol/L    Glucose 173 (*) 60 - 99 mg/dL    Urea Nitrogen 13  5 - 24 mg/dL    Creatinine 0.83  0.52 - 1.04 mg/dL    GFR Estimate 74  >60 mL/min/1.7m2    GFR Estimate If Black 90  >60 mL/min/1.7m2    Calcium 9.7  8.5 - 10.4 mg/dL    Bilirubin Total 0.4  0.2 - 1.3 mg/dL    Albumin 4.3  3.9 - 5.1 g/dL    Protein Total 7.8  6.8 - 8.8 g/dL    Alkaline Phosphatase 66  40 - 150 U/L    ALT 36  0 - 50 U/L    AST 28  0 - 45 U/L   CK TOTAL       Component Value Range    CK Total 66  30 - 225 U/L   CRP INFLAMMATION       Component Value Range    CRP Inflammation 10.4 (*) 0.0 - 8.0 mg/L   LIPASE       Component Value Range    Lipase 353 (*) 20 - 250 U/L   ERYTHROCYTE SEDIMENTATION RATE AUTO       Component Value Range    Sed Rate 26 (*) 0 - 20 mm/h   ROUTINE UA WITH MICROSCOPIC REFLEX TO CULTURE       Component Value Range    Color Urine Yellow      Appearance Urine Slightly Cloudy      Glucose Urine 30 (*) NEG mg/dL    Bilirubin Urine Negative  NEG    Ketones Urine 5 (*) NEG mg/dL    Specific Gravity Urine 1.026  1.003 - 1.035    Blood Urine Trace (*) NEG    pH Urine 5.0  5.0 - 7.0 pH    Protein Albumin Urine 10 (*) NEG mg/dL    Urobilinogen mg/dL Normal  0.0 - 2.0 mg/dL    Nitrite Urine Negative  NEG    Leukocyte Esterase Urine Negative  NEG    Source  Midstream Urine      WBC Urine 1  0 - 2 /HPF    RBC Urine 4 (*) 0 - 2 /HPF    Squamous Epithelial /HPF Urine <1  0 - 1 /HPF    Mucous Urine Present (*) NEG /LPF    Hyaline Casts 3 (*) 0 - 2 /LPF    Calcium Oxalate Moderate (*) NEG /HPF   COMPLEMENT C3       Component Value Range    Complement C3 143  76 - 169 mg/dL   COMPLEMENT C4       Component Value Range    Complement C4 31  15 - 50 mg/dL   HEPATITIS C ANTIBODY       Component Value Range    Hepatitis C Antibody Negative  NEG   CARDIOLIPIN ANTIBODY IGG AND IGM       Component Value Range    Cardiolipin IgG Marline    0 - 15.0 GPL    Value: <15.0      Interpretation:  Negative    Cardiolipin IgM Marline    0 - 12.5 MPL    Value: <12.5      Interpretation:  Negative   LUPUS PANEL       Component Value Range    Lupus Result    NEG    Value: Negative      (Note)      COMMENTS:      The INR is normal.      APTT is normal.  1:2 Mix is not indicated.      DRVVT Screen is normal.      Thrombin time is normal.      NEGATIVE TEST; A LUPUS ANTICOAGULANT WAS NOT DETECTED IN THIS      SPECIMEN WITHIN THE LIMITS OF THE TESTING REPERTOIRE.      If the clinical picture is strongly suggestive of an antiphospholipid      syndrome, recommend anticardiolipin and beta-2-glycoprotein (IgG and      IgM) antibody tests.      Leela Franks M.D.  [phone]      5/2/2013            INR =  0.93 N = 0.86-1.14  (No additional charge)      TT = 15.7 N = 13.0-19.0 sec  (No additional charge)            APTT'S:    Seconds      Reagent =  Stago LA      Normal  =  38      Patient  =  35      1:2 Mix  =  N/A      Reference =  31-43             DILUTE MADELINE VIPER VENOM TEST:      DRVVT Screen Ratio = 0.76 Normal = <1.21         IMMUNOGLOBULIN G SUBCLASSES       Component Value Range    IGG 1030  695 - 1620 mg/dL    IgG1 488  300 - 856 mg/dL    IgG2 325  158 - 761 mg/dL    IgG3 47  24 - 192 mg/dL    IgG4 18  11 - 86 mg/dL   SUNNY ANTIBODY PANEL       Component Value Range    Ribonucleic Protein  IgG Antibody 0      Smith Antibody IgG 1      SSA (RO) Antibody IgG 4      SSB (LA) Antibody IgG 0      Scleroderma Antibody IgG 0     BETA 2 GLYCOPROTEIN ANTIBODIES IGG IGM       Component Value Range    Beta-2-Glycopro IgG 1      Beta-2-Glycopro IgM 5     CYCLIC CIT PEPT IGG       Component Value Range    Cyclic Cit Pept IgG/IgA    <20 UNITS    Value: <20      Interpretation:  Negative   DNA DOUBLE STRANDED ANTIBODIES       Component Value Range    DNA-ds    0 - 29 IU/mL    Value: <15      Interpretation:  Negative       Component      Latest Ref Rng 3/11/2014   Sodium      133 - 144 mmol/L 137   Potassium      3.4 - 5.3 mmol/L 4.6   Chloride      94 - 109 mmol/L 97   Carbon Dioxide      20 - 32 mmol/L 20   Anion Gap      6 - 17 mmol/L 20 (H)   Glucose      60 - 99 mg/dL 243 (H)   Urea Nitrogen      5 - 24 mg/dL 35 (H)   Creatinine      0.52 - 1.04 mg/dL 1.47 (H)   GFR Estimate      >60 mL/min/1.7m2 38 (L)   GFR Estimate If Black      >60 mL/min/1.7m2 46 (L)   Calcium      8.5 - 10.4 mg/dL 9.7   Bilirubin Total      0.2 - 1.3 mg/dL 0.3   Albumin      3.9 - 5.1 g/dL 4.8   Protein Total      6.8 - 8.8 g/dL 7.9   Alkaline Phosphatase      40 - 150 U/L 73   ALT      0 - 50 U/L 35   AST      0 - 45 U/L 30   Color Urine       Yellow   Appearance Urine       Clear   Glucose Urine      NEG mg/dL 500 (A)   Bilirubin Urine      NEG Negative   Ketones Urine      NEG mg/dL Negative   Specific Gravity Urine      1.003 - 1.035 1.020   Blood Urine      NEG Negative   pH Urine      5.0 - 7.0 pH 5.5   Protein Albumin Urine      NEG mg/dL Negative   Urobilinogen Urine      0.2 - 1.0 EU/dL 0.2   Nitrite Urine      NEG Negative   Leukocyte Esterase Urine      NEG Negative   Source       Midstream Urine   WBC      4.0 - 11.0 10e9/L 14.0 (H)   RBC Count      3.8 - 5.2 10e12/L 5.02   Hemoglobin      11.7 - 15.7 g/dL 12.4   Hematocrit      35.0 - 47.0 % 37.1   MCV      78 - 100 fl 74 (L)   MCH      26.5 - 33.0 pg 24.7 (L)   MCHC       31.5 - 36.5 g/dL 33.4   RDW      10.0 - 15.0 % 15.3 (H)   Platelet Count      150 - 450 10e9/L 420       Component      Latest Ref Rng 3/25/2014   Sodium      133 - 144 mmol/L 137   Potassium      3.4 - 5.3 mmol/L 3.7   Chloride      94 - 109 mmol/L 100   Carbon Dioxide      20 - 32 mmol/L 21   Anion Gap      6 - 17 mmol/L 16   Glucose      60 - 99 mg/dL 214 (H)   Urea Nitrogen      5 - 24 mg/dL 20   Creatinine      0.52 - 1.04 mg/dL 1.02   GFR Estimate      >60 mL/min/1.7m2 58 (L)   GFR Estimate If Black      >60 mL/min/1.7m2 71   Calcium      8.5 - 10.4 mg/dL 9.5   Phosphorus      2.5 - 4.5 mg/dL 3.7   Albumin      3.9 - 5.1 g/dL 4.6     Component      Latest Ref Rng 4/30/2014   CRP Inflammation      0.0 - 8.0 mg/L 10.0 (H)   Sed Rate      0 - 20 mm/h 39 (H)   Rheumatoid Factor      <20 IU/mL <20   Glucose-6-PO4 Dehydrogenase       17.7     Component      Latest Ref Rng 6/11/2014 7/15/2014   WBC      4.0 - 11.0 10e9/L 11.0    RBC Count      3.8 - 5.2 10e12/L 4.74    Hemoglobin      11.7 - 15.7 g/dL 11.8    Hematocrit      35.0 - 47.0 % 36.1    MCV      78 - 100 fl 76 (L)    MCH      26.5 - 33.0 pg 24.9 (L)    MCHC      31.5 - 36.5 g/dL 32.7    RDW      10.0 - 15.0 % 13.9    Platelet Count      150 - 450 10e9/L 434    Diff Method       Automated Method    % Neutrophils       59.2    % Lymphocytes       31.6    % Monocytes       5.9    % Eosinophils       2.6    % Basophils       0.5    % Immature Granulocytes       0.2    Absolute Neutrophil      1.6 - 8.3 10e9/L 6.5    Absolute Lymphocytes      0.8 - 5.3 10e9/L 3.5    Absolute Monoctyes      0.0 - 1.3 10e9/L 0.7    Absolute Eosinophils      0.0 - 0.7 10e9/L 0.3    Absolute Basophils      0.0 - 0.2 10e9/L 0.1    Abs Immature Granulocytes      0 - 0.4 10e9/L 0.0    Sodium      133 - 144 mmol/L 138 140   Potassium      3.4 - 5.3 mmol/L 3.9 3.8   Chloride      94 - 109 mmol/L 97 103   Carbon Dioxide      20 - 32 mmol/L 26 22   Anion Gap      6 - 17 mmol/L 14.9 15    Glucose      60 - 99 mg/dL 111 (H) 163 (H)   Urea Nitrogen      5 - 24 mg/dL 28 (H) 15   Creatinine      0.52 - 1.04 mg/dL 1.10 (H) 0.99   GFR Estimate      >60 mL/min/1.7m2 53 (L) 60 (L)   GFR Estimate If Black      >60 mL/min/1.7m2 64 72   Calcium      8.5 - 10.4 mg/dL 10.3 9.3   Bilirubin Total      0.2 - 1.3 mg/dL 0.6 0.2   Albumin      3.9 - 5.1 g/dL 5.1 4.2   Protein Total      6.8 - 8.8 g/dL 9.0 (H) 7.2   Alkaline Phosphatase      40 - 150 U/L 87 69   ALT      0 - 50 U/L 37 33   AST      0 - 45 U/L 40 26   Color Urine       Straw    Appearance Urine       Clear    Glucose Urine      NEG mg/dL Negative    Bilirubin Urine      NEG Negative    Ketones Urine      NEG mg/dL Negative    Specific Gravity Urine      1.003 - 1.035 1.005    Blood Urine      NEG Negative    pH Urine      5.0 - 7.0 pH 5.0    Protein Albumin Urine      NEG mg/dL Negative    Urobilinogen mg/dL      0.0 - 2.0 mg/dL Normal    Nitrite Urine      NEG Negative    Leukocyte Esterase Urine      NEG Negative    Source       Midstream Urine    Lipase      20 - 250 U/L 403 (H)    CRP Inflammation      0.0 - 8.0 mg/L <5.0    N-Terminal Pro Bnp      0 - 125 pg/mL  55   Hemoglobin A1C      4.3 - 6.0 %  7.0 (H)     Component      Latest Ref Rn 11/12/2014   Sodium      133 - 144 mmol/L 135   Potassium      3.4 - 5.3 mmol/L 3.8   Chloride      94 - 109 mmol/L 104   Carbon Dioxide      20 - 32 mmol/L 24   Anion Gap      3 - 14 mmol/L 7   Glucose      70 - 99 mg/dL 125 (H)   Urea Nitrogen      7 - 30 mg/dL 17   Creatinine      0.52 - 1.04 mg/dL 1.18 (H)   GFR Estimate      >60 mL/min/1.7m2 49 (L)   GFR Estimate If Black      >60 mL/min/1.7m2 59 (L)   Calcium      8.5 - 10.1 mg/dL 9.8   WBC      4.0 - 11.0 10e9/L 11.1 (H)   RBC Count      3.8 - 5.2 10e12/L 4.05   Hemoglobin      11.7 - 15.7 g/dL 9.8 (L)   Hematocrit      35.0 - 47.0 % 30.6 (L)   MCV      78 - 100 fl 76 (L)   MCH      26.5 - 33.0 pg 24.2 (L)   MCHC      31.5 - 36.5 g/dL 32.0   RDW       10.0 - 15.0 % 15.5 (H)   Platelet Count      150 - 450 10e9/L 484 (H)   CT CHEST PULMONARY EMBOLISM W CONTRAST 5/20/2015 4:57 PM  HISTORY: Pain. SOB. Elevated d-dimer.   TECHNIQUE: 65 mL Isovue 370. Axial images with coronal  reconstructions.  COMPARISON: None.  FINDINGS: Calcified granuloma with surrounding scarring in the  posterolateral left upper lobe. Triangular shaped opacity at the right  lung base in the lateral right middle lobe could represent some  scarring, atelectasis or infiltrate. There is also some scarring or  atelectasis in the posteromedial right lung base. Lungs otherwise  clear.  The pulmonary arteries are well opacified. No CT evidence for acute  pulmonary embolus. No aortic dissection.  Diffuse fatty infiltration of the liver.  IMPRESSION  IMPRESSION:   1. No pulmonary embolus identified.  2. Small focus of atelectasis, infiltrate or scarring in the lateral  right middle lobe.  3. Old granulomatous disease.  4. Otherwise negative.  SILVERIO VAZQUEZ MD  Component      Latest Ref Rng 3/27/2015   WBC      4.0 - 11.0 10e9/L 11.8 (H)   RBC Count      3.8 - 5.2 10e12/L 4.53   Hemoglobin      11.7 - 15.7 g/dL 11.0 (L)   Hematocrit      35.0 - 47.0 % 33.8 (L)   MCV      78 - 100 fl 75 (L)   MCH      26.5 - 33.0 pg 24.3 (L)   MCHC      31.5 - 36.5 g/dL 32.5   RDW      10.0 - 15.0 % 15.4 (H)   Platelet Count      150 - 450 10e9/L 419   Diff Method       Automated Method   % Neutrophils       75.3   % Lymphocytes       17.4   % Monocytes       4.4   % Eosinophils       2.5   % Basophils       0.2   % Immature Granulocytes       0.2   Absolute Neutrophil      1.6 - 8.3 10e9/L 8.9 (H)   Absolute Lymphocytes      0.8 - 5.3 10e9/L 2.1   Absolute Monoctyes      0.0 - 1.3 10e9/L 0.5   Absolute Eosinophils      0.0 - 0.7 10e9/L 0.3   Absolute Basophils      0.0 - 0.2 10e9/L 0.0   Abs Immature Granulocytes      0 - 0.4 10e9/L 0.0   Creatinine      0.52 - 1.04 mg/dL 1.12 (H)   GFR Estimate      >60 mL/min/1.7m2 52  (L)   GFR Estimate If Black      >60 mL/min/1.7m2 63   Iron      35 - 180 ug/dL 25 (L)   Iron Binding Cap      240 - 430 ug/dL 346   Iron Saturation Index      15 - 46 % 7 (L)   Albumin      3.4 - 5.0 g/dL 4.0   ALT      0 - 50 U/L 24   AST      0 - 45 U/L 15   CRP Inflammation      0.0 - 8.0 mg/L 28.0 (H)   Sed Rate      0 - 20 mm/h 81 (H)   Rheumatoid Factor      <20 IU/mL <20   Vitamin D Deficiency screening      30 - 75 ug/L 44   Ferritin      8 - 252 ng/mL 34     Component      Latest Ref Rng 7/29/2015   Sodium      133 - 144 mmol/L 137   Potassium      3.4 - 5.3 mmol/L 3.8   Chloride      94 - 109 mmol/L 106   Carbon Dioxide      20 - 32 mmol/L 23   Anion Gap      3 - 14 mmol/L 8   Glucose      70 - 99 mg/dL 148 (H)   Urea Nitrogen      7 - 30 mg/dL 17   Creatinine      0.52 - 1.04 mg/dL 1.02   GFR Estimate      >60 mL/min/1.7m2 58 (L)   GFR Estimate If Black      >60 mL/min/1.7m2 70   Calcium      8.5 - 10.1 mg/dL 9.0   Bilirubin Total      0.2 - 1.3 mg/dL 0.5   Albumin      3.4 - 5.0 g/dL 4.2   Protein Total      6.8 - 8.8 g/dL 7.4   Alkaline Phosphatase      40 - 150 U/L 64   ALT      0 - 50 U/L 23   AST      0 - 45 U/L 19     Results for FAVIO MARTINEZ (MRN [id number]) as of 10/30/2015 17:00   Ref. Range 8/21/2012 09:06 5/22/2013 14:22 4/14/2014 12:06 9/11/2014 12:35 12/4/2014 16:38   TSH Latest Range: 0.40-4.00 mU/L 0.83 0.43 0.27 (L) 0.23 (L) 0.22 (L)     Component      Latest Ref Rng 10/30/2015   WBC      4.0 - 11.0 10e9/L 13.4 (H)   RBC Count      3.8 - 5.2 10e12/L 4.76   Hemoglobin      11.7 - 15.7 g/dL 12.4   Hematocrit      35.0 - 47.0 % 37.9   MCV      78 - 100 fl 80   MCH      26.5 - 33.0 pg 26.1 (L)   MCHC      31.5 - 36.5 g/dL 32.7   RDW      10.0 - 15.0 % 14.5   Platelet Count      150 - 450 10e9/L 324   Diff Method       Automated Method   % Neutrophils       67.7   % Lymphocytes       22.7   % Monocytes       6.3   % Eosinophils       2.7   % Basophils       0.4   % Immature Granulocytes        0.2   Absolute Neutrophil      1.6 - 8.3 10e9/L 9.1 (H)   Absolute Lymphocytes      0.8 - 5.3 10e9/L 3.1   Absolute Monoctyes      0.0 - 1.3 10e9/L 0.9   Absolute Eosinophils      0.0 - 0.7 10e9/L 0.4   Absolute Basophils      0.0 - 0.2 10e9/L 0.1   Abs Immature Granulocytes      0 - 0.4 10e9/L 0.0   Creatinine      0.52 - 1.04 mg/dL 1.21 (H)   GFR Estimate      >60 mL/min/1.7m2 47 (L)   GFR Estimate If Black      >60 mL/min/1.7m2 57 (L)   Iron      35 - 180 ug/dL 70   Iron Binding Cap      240 - 430 ug/dL 428   Iron Saturation Index      15 - 46 % 16   Albumin      3.4 - 5.0 g/dL 4.6   ALT      0 - 50 U/L 29   AST      0 - 45 U/L 21   CRP Inflammation      0.0 - 8.0 mg/L <2.9   Sed Rate      0 - 20 mm/h 8   Vitamin D Deficiency screening      20 - 75 ug/L 46   Ferritin      8 - 252 ng/mL 18   TSH      0.40 - 4.00 mU/L 0.49   T4 Free      0.76 - 1.46 ng/dL 1.06   Free T3      2.3 - 4.2 pg/mL 2.8     Component      Latest Ref Rng 11/17/2015   Testosterone Total      8 - 60 ng/dL 19   Sex Hormone Binding Globulin      30 - 135 nmol/L 32   Free Testosterone Calculated      0.11 - 0.58 ng/dL 0.34   Vitamin A       0.61   Retinol Palmitate       <0.02 . . .   Vitamin A Interp       Normal . . .   Thyroglobulin Antibody      <40 IU/mL <20   Thyroid Peroxidase Antibody      <35 IU/mL 31   DHEA Sulfate      35 - 430 ug/dL 101   Zinc       68     Component      Latest Ref Rng 1/27/2016   Sodium      133 - 144 mmol/L 135   Potassium      3.4 - 5.3 mmol/L 4.0   Chloride      94 - 109 mmol/L 104   Carbon Dioxide      20 - 32 mmol/L 24   Anion Gap      3 - 14 mmol/L 7   Glucose      70 - 99 mg/dL 88   Urea Nitrogen      7 - 30 mg/dL 20   Creatinine      0.52 - 1.04 mg/dL 1.13 (H)   GFR Estimate      >60 mL/min/1.7m2 51 (L)   GFR Estimate If Black      >60 mL/min/1.7m2 62   Calcium      8.5 - 10.1 mg/dL 9.4   Bilirubin Total      0.2 - 1.3 mg/dL 0.7   Albumin      3.4 - 5.0 g/dL 4.3   Protein Total      6.8 - 8.8 g/dL 7.7  "  Alkaline Phosphatase      40 - 150 U/L 66   ALT      0 - 50 U/L 24   AST      0 - 45 U/L 18   Cholesterol      <200 mg/dL 112   Triglycerides      <150 mg/dL 100   HDL Cholesterol      >49 mg/dL 34 (L)   LDL Cholesterol Calculated      <100 mg/dL 58   Non HDL Cholesterol      <130 mg/dL 78   N-Terminal Pro Bnp      0 - 125 pg/mL 29   Hemoglobin A1C      4.3 - 6.0 % 7.0 (H)   Amylase      30 - 110 U/L 60   Lipase      73 - 393 U/L 394 (H)   Troponin I ES      0.000 - 0.045 ug/L <0.015 . . .     Component      Latest Ref Rng 2/24/2016   Angiotensin Converting Enzyme       <5 (L) . . .   Neutrophil Cytoplasmic IgG Antibody       <1:20 . . .   Sed Rate      0 - 20 mm/h 86 (H)     Copath Report      Patient Name: FAVIO MARTINEZ   MR#: [id number]   Specimen #: [id number]   Collected: 3/15/2016   Received: 3/15/2016   Reported: 3/17/2016 13:33   Ordering Phy(s): PARVEEN ENRIQUEZ     ORIGINAL REPORT FOLLOWS ADDENDUM  ADDENDUM     TO ORIGINAL REPORT   Status: Signed Out   Date Ordered:3/18/2016   Date Complete:3/18/2016   Date Reported:3/18/2016 12:06   Signed Out By: Marianne Godfrey MD     INTERPRETATION:   This addendum is done to incorporate the results of fungal (GMS) stains   done on the specimen.  Specimen is negative for fungal organisms.  The   original final diagnosis remains unchanged.     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  Negative for malignancy(Please   see microscopic description)     Electronically signed out by:     Marianne Godfrey MD     CLINICAL HISTORY:   right orbital mass     GROSS:   The specimen is received labeled \"right orbital biopsy\" and consists of   red-tan nodule measuring 1.5 x 0.9 x 0.6 cm with one smooth side and   opposite rough side consistent with " periosteum.  The specimen is   bisected revealing homogenous pale tan fleshy cut surface.  Touch   preparations are prepared, air dried and fixed, portion of specimen is   submitted in RPMI for possible lymphoma workup [facility],   ([facility], [place], [place] ).  The remainder is entirely submitted.   (Dictated by: Marianne Godfrey MD 3/15/2016 04:45 PM)     MICROSCOPIC:     Specimen consists of portion of lacrimal gland with acute and chronic   inflammation.  Focal area of microabscess formation associated with   small areas of necrosis are also present.  Inflammation is seen   extending to the periorbital adipose tissue forming panniculitis.   Specimen is negative for malignancy.  Samples sent for immunophenotyping   to [email or web address], ([facility], [place], [place] ) reveals no evidence   of monoclonality or aberrant antigen expression.  A GMS (fungal) stain   is pending and results will be reported as an addendum.     CPT Codes:   A: 38255-OA8, 95606-DVKSK, SOH, 99732-LDZTA, 37699-JDQV     TESTING LAB LOCATION:   [address]  [address] [phone]     COLLECTION SITE:   Client: [facility]   Location: [facility] (S)            Component      Latest Ref Rng 4/29/2016   Nucleated RBCs      0 /100 0   Absolute Neutrophil      1.6 - 8.3 10e9/L 8.9 (H)   Absolute Lymphocytes      0.8 - 5.3 10e9/L 3.2   Absolute Monocytes      0.0 - 1.3 10e9/L 0.8   Absolute Eosinophils      0.0 - 0.7 10e9/L 0.2   Absolute Basophils      0.0 - 0.2 10e9/L 0.1   Abs Immature Granulocytes      0 - 0.4 10e9/L 0.1   Absolute Nucleated RBC       0.0   IGG      695 - 1620 mg/dL 836   IgG1      300 - 856 mg/dL 280 (L)   IgG2      158 - 761 mg/dL 277   IgG3      24 - 192 mg/dL 35   IgG4      11 - 86 mg/dL 16   RNP Antibody IgG      0.0 - 0.9 AI <0.2 . . .   Smith SUNNY Antibody IgG      0.0 - 0.9 AI <0.2 . . .   SSA (Ro) (SUNNY) Antibody, IgG      0.0 - 0.9 AI <0.2 . . .    SSB (La) (SUNNY) Antibody, IgG      0.0 - 0.9 AI <0.2 . . .   Scleroderma Antibody Scl-70 SUNNY IgG      0.0 - 0.9 AI <0.2 . . .   Cholesterol      <200 mg/dL 154   Triglycerides      <150 mg/dL 220 (H)   HDL Cholesterol      >49 mg/dL 42 (L)   LDL Cholesterol Calculated      <100 mg/dL 67   Non HDL Cholesterol      <130 mg/dL 111   M Tuberculosis Result      NEG Negative   M Tuberculosis Antigen Value       0.00   Albumin      3.4 - 5.0 g/dL 4.3   ALT      0 - 50 U/L 30   AST      0 - 45 U/L 10   Complement C3      76 - 169 mg/dL 157   Complement C4      15 - 50 mg/dL 32   CRP Inflammation      0.0 - 8.0 mg/L <2.9   Sed Rate      0 - 20 mm/h 2   DNA-ds      <10 IU/mL 1   Cyclic Citrullinated Peptide Antibody, IgG      <7 U/mL 1   Rheumatoid Factor      <20 IU/mL <20   Proteinase 3 Antibody IgG      0.0 - 0.9 AI <0.2 . . .   Myeloperoxidase Antibody IgG      0.0 - 0.9 AI 2.5 (H)   Vitamin D Deficiency screening      20 - 75 ug/L 24   Hemoglobin A1C      4.3 - 6.0 % 7.9 (H)   ALLI by IFA IgG       1:40 (A) . . .     Component      Latest Ref Rng & Units 4/7/2017   WBC      4.0 - 11.0 10e9/L 11.3 (H)   RBC Count      3.8 - 5.2 10e12/L 4.77   Hemoglobin      11.7 - 15.7 g/dL 12.5   Hematocrit      35.0 - 47.0 % 38.7   MCV      78 - 100 fl 81   MCH      26.5 - 33.0 pg 26.2 (L)   MCHC      31.5 - 36.5 g/dL 32.3   RDW      10.0 - 15.0 % 13.2   Platelet Count      150 - 450 10e9/L 347   Diff Method       Automated Method   % Neutrophils      % 67.1   % Lymphocytes      % 22.9   % Monocytes      % 6.2   % Eosinophils      % 3.0   % Basophils      % 0.4   % Immature Granulocytes      % 0.4   Nucleated RBCs      0 /100 0   Absolute Neutrophil      1.6 - 8.3 10e9/L 7.5   Absolute Lymphocytes      0.8 - 5.3 10e9/L 2.6   Absolute Monocytes      0.0 - 1.3 10e9/L 0.7   Absolute Eosinophils      0.0 - 0.7 10e9/L 0.3   Absolute Basophils      0.0 - 0.2 10e9/L 0.1   Abs Immature Granulocytes      0 - 0.4 10e9/L 0.1   Absolute Nucleated  RBC       0.0   IGG      695 - 1620 mg/dL 962   IgG1      300 - 856 mg/dL Test sent to [facility]. See ARMISC.   IgG2      158 - 761 mg/dL Test sent to [facility]. See ARMISC.   IgG3      24 - 192 mg/dL Test sent to [facility]. See ARMISC.   IgG4      11 - 86 mg/dL Test sent to [facility]. See ARMISC.   Result       SEE NOTE . . .   Test Name       IGG SUBCLASSES   Send Outs Misc Test Code       06139   Send Outs Misc Test Specimen       Serum   Creatinine      0.52 - 1.04 mg/dL 1.20 (H)   GFR Estimate      >60 mL/min/1.7m2 47 (L)   GFR Estimate If Black      >60 mL/min/1.7m2 57 (L)   Creatinine Urine      mg/dL    Albumin Urine mg/L      mg/L    Albumin Urine mg/g Cr      0 - 25 mg/g Cr    Myeloperoxidase Antibody IgG      0.0 - 0.9 AI 2.9 (H)   Proteinase 3 Antibody IgG      0.0 - 0.9 AI <0.2 . . .   Neutrophil Cytoplasmic IgG Antibody       1:80 (A) . . .   Angiotensin Converting Enzyme       <5 (L) . . .   Lab Scanned Result       TPMT GENOTYPE-Scanned   Vitamin C       56   ALT      0 - 50 U/L 23   Albumin      3.4 - 5.0 g/dL 4.3   AST      0 - 45 U/L 18   CRP Inflammation      0.0 - 8.0 mg/L 3.7   Sed Rate      0 - 30 mm/h 17   Vitamin D Deficiency screening      20 - 75 ug/L 40   Hemoglobin A1C      4.3 - 6.0 %      Component      Latest Ref Rng & Units 4/26/2017   WBC      4.0 - 11.0 10e9/L 11.8 (H)   RBC Count      3.8 - 5.2 10e12/L 4.23   Hemoglobin      11.7 - 15.7 g/dL 11.2 (L)   Hematocrit      35.0 - 47.0 % 35.0   MCV      78 - 100 fl 83   MCH      26.5 - 33.0 pg 26.5   MCHC      31.5 - 36.5 g/dL 32.0   RDW      10.0 - 15.0 % 13.4   Platelet Count      150 - 450 10e9/L 304   Diff Method       Automated Method   % Neutrophils      % 66.2   % Lymphocytes      % 22.7   % Monocytes      % 6.5   % Eosinophils      % 3.9   % Basophils      % 0.4   % Immature Granulocytes      % 0.3   Nucleated RBCs      0 /100 0   Absolute Neutrophil      1.6 - 8.3 10e9/L 7.8   Absolute Lymphocytes      0.8 - 5.3 10e9/L 2.7   Absolute Monocytes       0.0 - 1.3 10e9/L 0.8   Absolute Eosinophils      0.0 - 0.7 10e9/L 0.5   Absolute Basophils      0.0 - 0.2 10e9/L 0.1   Abs Immature Granulocytes      0 - 0.4 10e9/L 0.0   Absolute Nucleated RBC       0.0   IGG      695 - 1620 mg/dL    IgG1      300 - 856 mg/dL    IgG2      158 - 761 mg/dL    IgG3      24 - 192 mg/dL    IgG4      11 - 86 mg/dL    Result          Test Name          Send Outs Misc Test Code          Send Outs Misc Test Specimen          Creatinine      0.52 - 1.04 mg/dL 1.24 (H)   GFR Estimate      >60 mL/min/1.7m2 46 (L)   GFR Estimate If Black      >60 mL/min/1.7m2 55 (L)   Creatinine Urine      mg/dL 121   Albumin Urine mg/L      mg/L <5   Albumin Urine mg/g Cr      0 - 25 mg/g Cr Unable to calculate due to low value   Myeloperoxidase Antibody IgG      0.0 - 0.9 AI    Proteinase 3 Antibody IgG      0.0 - 0.9 AI    Neutrophil Cytoplasmic IgG Antibody          Angiotensin Converting Enzyme          Lab Scanned Result          Vitamin C          ALT      0 - 50 U/L 25   Albumin      3.4 - 5.0 g/dL 4.1   AST      0 - 45 U/L 15   CRP Inflammation      0.0 - 8.0 mg/L    Sed Rate      0 - 30 mm/h    Vitamin D Deficiency screening      20 - 75 ug/L    Hemoglobin A1C      4.3 - 6.0 % 7.8 (H)   EXAMINATION: CT CHEST ABDOMEN PELVIS W/O CONTRAST, 4/7/2017 2:59 PM     TECHNIQUE: Helical CT images from the thoracic inlet through the  symphysis pubis were obtained without IV contrast.     COMPARISON: CT chest on 5/20/2015. CT abdomen pelvis on 6/11/2014     HISTORY: Granuloma of right orbit. Concern for malignancy, lymphoma.     Additional history from the EMR: 50-year-old female with rheumatoid  arthritis and fibromyalgia. Presented on April 2016 with new right  orbital inflammatory mass, biopsied showed panniculitis without  infection or malignancy. Some intermittent swelling around her right  orbit.     FINDINGS:     Chest: Cardiac size is not enlarged. No pericardial effusion.  Calcified subcentimeter  mediastinal and left hilar lymph nodes. No  thoracic adenopathy. Coronary artery calcifications/stents.  Benign-appearing coarse calcifications in the thyroid, better  described on ultrasound thyroid from 9/13/2016.     Central airways are patent. No pneumothorax or pleural effusion.  Calcified pulmonary granuloma in the posterior left upper lobe,  benign. Small focus of subpleural fibrosis and cysts along the  anterior lateral right lower lobe (image 96 series 6).     Abdomen and pelvis: Cholecystectomy. The liver, adrenal glands,  kidneys, spleen, pancreas, stomach, duodenum are without focal  abnormality on these noncontrast images.     No abdominal aortic aneurysm. No suspicious adenopathy in the abdomen  or pelvis. Bladder is intact. Calcified fibroid off the uterine  fundus. IUD in the uterus.     No focal bowel wall thickening or obstruction. No free air or free  fluid. Appendix is normal. Small periumbilical hernia.     Bones and soft tissues: No suspicious osseous lesions. Unremarkable  superficial soft tissues.         IMPRESSION: No evidence of malignancy in the chest, abdomen, or  pelvis.        I have personally reviewed the examination and initial interpretation  and I agree with the findings.     CONNIE BRICE      Component      Latest Ref Rng & Units 6/1/2017   WBC      4.0 - 11.0 10e9/L 12.0 (H)   RBC Count      3.8 - 5.2 10e12/L 5.18   Hemoglobin      11.7 - 15.7 g/dL 13.8   Hematocrit      35.0 - 47.0 % 41.0   MCV      78 - 100 fl 79   MCH      26.5 - 33.0 pg 26.6   MCHC      31.5 - 36.5 g/dL 33.7   RDW      10.0 - 15.0 % 14.8   Platelet Count      150 - 450 10e9/L 322   Diff Method       Automated Method   % Neutrophils      % 76.7   % Lymphocytes      % 16.5   % Monocytes      % 4.6   % Eosinophils      % 1.9   % Basophils      % 0.3   Absolute Neutrophil      1.6 - 8.3 10e9/L 9.2 (H)   Absolute Lymphocytes      0.8 - 5.3 10e9/L 2.0   Absolute Monocytes      0.0 - 1.3 10e9/L 0.6   Absolute  Eosinophils      0.0 - 0.7 10e9/L 0.2   Absolute Basophils      0.0 - 0.2 10e9/L 0.0   Color Urine       Yellow   Appearance Urine       Clear   Glucose Urine      NEG mg/dL Negative   Bilirubin Urine      NEG Negative   Ketones Urine      NEG mg/dL Negative   Specific Gravity Urine      1.003 - 1.035 1.010   pH Urine      5.0 - 7.0 pH 5.5   Protein Albumin Urine      NEG mg/dL Negative   Urobilinogen Urine      0.2 - 1.0 EU/dL 0.2   Nitrite Urine      NEG Negative   Blood Urine      NEG Negative   Leukocyte Esterase Urine      NEG Negative   Source       Midstream Urine   WBC Urine      0 - 2 /HPF O - 2   RBC Urine      0 - 2 /HPF O - 2   Squamous Epithelial /LPF Urine      FEW /LPF Few   Bacteria Urine      NEG /HPF Few (A)   Creatinine      0.52 - 1.04 mg/dL 1.19 (H)   GFR Estimate      >60 mL/min/1.7m2 48 (L)   GFR Estimate If Black      >60 mL/min/1.7m2 58 (L)   Protein Random Urine      g/L <0.05   Protein Total Urine g/gr Creatinine      0 - 0.2 g/g Cr Unable to calculate due to low value   Myeloperoxidase Antibody IgG      0.0 - 0.9 AI 1.9 (H)   Proteinase 3 Antibody IgG      0.0 - 0.9 AI <0.2 . . .   ALT      0 - 50 U/L 29   AST      0 - 45 U/L 18   Albumin      3.4 - 5.0 g/dL 4.6   CRP Inflammation      0.0 - 8.0 mg/L 6.1   Sed Rate      0 - 30 mm/h 13   Creatinine Urine Random      mg/dL 54   Neutrophil Cytoplasmic IgG Antibody       <1:20 . . .   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      Comparison: MRI brain 5/29/2013      Technique:   Orbits: Axial and coronal T1-weighted, and coronal T2-weighted images  obtained without intravenous contrast. Post-intravenous contrast  (using gadolinium) sagittal FLAIR, were obtained with  fat-saturation,  focused on the orbits.  Brain: Axial susceptibility-weighted and FLAIR sequences were obtained  of the whole brain without intravenous contrast, and postcontrast  axial T1-weighted images were obtained through the whole brain.   Contrast: 7.5mL Gadavist     Findings:  New asymmetric enlargement of the right lacrimal gland and enhancement  of the right lacrimal gland with surrounding ill-defined crescentic T2  hyperintense signal and enhancement within the right superior  superotemporal orbit, predominantly involving the extraconal space  with slight intraconal extension. No definite associated restricted  diffusion. No discrete extraocular muscle enlargement. Normal  symmetric optic nerve signal. Cavernous sinuses and orbital apices are  normal. Globes are normal.     Whole brain images are symmetric minimal leukoaraiosis and generalized  parenchymal volume loss. Ventricles are not enlarged. No intracranial  hemorrhage, mass effect, midline shift or abnormal extra axial fluid  collection. No abnormal foci of intracranial enhancement or restricted  diffusion. Paranasal sinuses and mastoid air cells are clear.            Impression:    New abnormal enlargement of the right lacrimal gland with surrounding  ill-defined T2 hyperintense signal and enhancement centered in the  superotemporal right orbital fat, which may represent   infectious/inflammatory dacryadenitis, orbital pseudotumor, lymphoma,  carcinoma, sarcoidosis or IgG4 disease..     I have personally reviewed the examination and initial interpretation  and I agree with the findings.     PATRICIA BRANTLEY MD      Component      Latest Ref Rng & Units 6/1/2017   WBC      4.0 - 11.0 10e9/L 12.0 (H)   RBC Count      3.8 - 5.2 10e12/L 5.18   Hemoglobin      11.7 - 15.7 g/dL 13.8   Hematocrit      35.0 - 47.0 % 41.0   MCV      78 - 100 fl 79   MCH      26.5 - 33.0 pg 26.6   MCHC      31.5 - 36.5 g/dL 33.7   RDW      10.0 - 15.0 % 14.8   Platelet  Count      150 - 450 10e9/L 322   Diff Method       Automated Method   % Neutrophils      % 76.7   % Lymphocytes      % 16.5   % Monocytes      % 4.6   % Eosinophils      % 1.9   % Basophils      % 0.3   Absolute Neutrophil      1.6 - 8.3 10e9/L 9.2 (H)   Absolute Lymphocytes      0.8 - 5.3 10e9/L 2.0   Absolute Monocytes      0.0 - 1.3 10e9/L 0.6   Absolute Eosinophils      0.0 - 0.7 10e9/L 0.2   Absolute Basophils      0.0 - 0.2 10e9/L 0.0   Color Urine       Yellow   Appearance Urine       Clear   Glucose Urine      NEG mg/dL Negative   Bilirubin Urine      NEG Negative   Ketones Urine      NEG mg/dL Negative   Specific Gravity Urine      1.003 - 1.035 1.010   pH Urine      5.0 - 7.0 pH 5.5   Protein Albumin Urine      NEG mg/dL Negative   Urobilinogen Urine      0.2 - 1.0 EU/dL 0.2   Nitrite Urine      NEG Negative   Blood Urine      NEG Negative   Leukocyte Esterase Urine      NEG Negative   Source       Midstream Urine   WBC Urine      0 - 2 /HPF O - 2   RBC Urine      0 - 2 /HPF O - 2   Squamous Epithelial /LPF Urine      FEW /LPF Few   Bacteria Urine      NEG /HPF Few (A)   Creatinine      0.52 - 1.04 mg/dL 1.19 (H)   GFR Estimate      >60 mL/min/1.7m2 48 (L)   GFR Estimate If Black      >60 mL/min/1.7m2 58 (L)   Protein Random Urine      g/L <0.05   Protein Total Urine g/gr Creatinine      0 - 0.2 g/g Cr Unable to calculate due to low value   Myeloperoxidase Antibody IgG      0.0 - 0.9 AI 1.9 (H)   Proteinase 3 Antibody IgG      0.0 - 0.9 AI <0.2 . . .   ALT      0 - 50 U/L 29   AST      0 - 45 U/L 18   Albumin      3.4 - 5.0 g/dL 4.6   CRP Inflammation      0.0 - 8.0 mg/L 6.1   Sed Rate      0 - 30 mm/h 13   Creatinine Urine Random      mg/dL 54   Neutrophil Cytoplasmic IgG Antibody       <1:20 . . .   Creatinine Urine      mg/dL 54       Patient Name: FAVIO MARTINEZ   MR#: [id number]   Specimen #: [id number]   Collected: 8/4/2017   Received: 8/4/2017   Reported: 8/9/2017 16:19   Ordering Phy(s): ALI  "SANDRA     For improved result formatting, select 'View Enhanced Report Format'   under Linked Documents section.     SPECIMEN(S):   Eye, right lacrimal gland     FINAL DIAGNOSIS:   Eye, right, \"lacrimal gland\", biopsy   - Dense fibrosis and patchy inflammation   - No residual lacrimal gland seen     COMMENT:   Sections show tissue involved by dense fibrosis and patchy inflammation.   There is no morphologic or immunohistochemical evidence of lymphoma. By   separate report, concurrent flow cytometry studies ([id number]),   performed at the [facility], show polytypic B cells and no   aberrant immunophenotype on T cells. Immunohistochemical stains for IgG   and IgG-4 were performed, which provide no support for IgG-4-related   disease. Some of these changes may be related to prior surgery. Sjögren   disease is not excluded. There is no evidence of malignancy. Dr. Max Conway has reviewed this case and concurs.     Electronically signed out by:     Antonella Beth M.D.   INDICATION:  Right eye edema. Reported history of right orbital biopsy yielding pathology consistent with idiopathic inflammation.    TECHNIQUE:  Noncontrast CT images were obtained through the brain and facial bones.    COMPARISON:  CT sinus 03/27/2017, MRI brain and orbits 02/15/2016.     FINDINGS:  CT facial bones:   Large soft tissue lesion centered within the lateral intraconal and extraconal right orbit has significantly increased in size compared to the CT dated 03/27/2017. The lesion is inseparable from the right superior, lateral, and inferior rectus muscles, as well as the right lacrimal gland. The lesion demonstrates extension posteriorly slightly proximal to the orbital apex, as well as extension into the medial orbit superiorly and anteriorly. Mass effect includes medial bowing of the optic nerve sheath complex and pronounced proptosis of the right globe.  No mass is identified in the right orbit.  Torus palatinus. "   Minimal mucosal thickening in the ethmoid air cells. The remainder of the visualized paranasal sinuses are clear.  CT brain:  The ventricles and sulci within normal limits for patient age. No mass effect or midline shift. The gray-white differentiation is maintained.  No acute intracranial hemorrhage or pathologic extra-axial fluid collection.  The calvarium is intact. The mastoid air cells are clear.    IMPRESSION:  1. Large soft tissue lesion centered within the lateral intraconal and extraconal right orbit has significantly increased in size compared to the CT dated 03/27/2017. The lesion is inseparable from the right lacrimal gland and rectus musculature. Mass effect includes medial bowing of the optic nerve sheath complex and pronounced proptosis of the right globe. Given provided history, findings may represent a progressive inflammatory etiology (pseudotumor). Other differential considerations, such as sarcoidosis or lymphoma, could also have this appearance.  2. No acute intracranial hemorrhage or intracranial mass effect.    Please note that all CT scans at this facility use dose modulation, iterative reconstruction, and/or weight-based dosing when appropriate to reduce radiation dose to as low as reasonably achievable.    Dictated by Charles Ward MD @ Jul 16 2018  3:54PM    Signed by Dr. Charles Ward @ Jul 16 2018  4:20PM    ROS:  A comprehensive ROS was done, positives are per HPI.        HISTORY REVIEW:  Past Medical History:   Diagnosis Date     Abnormal glandular Papanicolaou smear of cervix 1992     Allergic rhinitis     Allergy, airborne subst     Arthritis      ASCVD (arteriosclerotic cardiovascular disease)      Chronic pain      Chronic pancreatitis (H)     idiopathic, Tx: PPI, antioxidants, pancreatic enzymes     Common migraine      Coronary artery disease      Costochondritis      Difficulty in walking(719.7)      Dyspnea on exertion      Ectasia, mammary duct     [facility], persistent nipple discharge     Elevated fasting glucose      Gastro-oesophageal reflux disease      Hernia      History of angina      Hyperlipidemia LDL goal < 100      Hypertension goal BP (blood pressure) < 140/90     Essential hypertension     Iron deficiency anemia      Ischemic cardiomyopathy      Menorrhagia      Migraine headaches      Mild persistent asthma      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 Matias, neg w/u, Rx'd w/prednisone     Ventral hernia, unspecified, without mention of obstruction or gangrene 2012     Past Surgical History:   Procedure Laterality Date     C ECHO HEART XTHORACIC,COMPLETE, W/O DOPPLER  04    [facility] WNL, EF 60%     C/SECTION, LOW TRANSVERSE           CARDIAC SURGERY      cardiac stent- recent in 16; 4 other stents     DILATION AND CURETTAGE N/A 2016    Procedure: DILATION AND CURETTAGE;  Surgeon: Nahed Butler MD;  Location: [facility]      UGI ENDOSCOPY W EUS  08    Dr. Pastrana, possible chronic pancreatitis, fatty liver     [place] [facility] [place] INTRAUTERINE DEVICE N/A 2016    Procedure: INSERT INTRAUTERINE DEVICE;  Surgeon: Nahed Butler MD;  Location: [facility]     INT UTERINE FIBRIOD EMBOLIZATION  10/29/2014     LAPAROSCOPIC CHOLECYSTECTOMY   08    Dr. Arnol GRUBBS TX, CERVICAL      s/p LEEP     ORBITOTOMY Right 3/15/2016    Procedure: ORBITOTOMY;  Surgeon: Myron Cyr MD;  Location: [facility]     ORBITOTOMY Right 2017    Procedure: ORBITOTOMY;  RIGHT ORBITOTOMY AND BIOPSY;  Surgeon: Charis Holbrook MD;  Location: [facility]     REPAIR PTOSIS Right 2017    Procedure: REPAIR PTOSIS;  RIGHT UPPER LID PTOSIS REPAIR;  Surgeon: Myron Cyr MD;  Location: [facility]     UPPER GI ENDOSCOPY  10/21/08    mild gastritis, Dr. Hidalgo     Family History   Problem Relation Age of Onset     Heart Disease Father 50        heart attack     Cerebrovascular Disease Father      Diabetes Father      Hypertension Father      Depression Father      C.A.D. Father      Hypertension Mother      Arthritis Mother      Heart Disease Mother         long qt syndrome     Thyroid Disease Mother      C.A.D. Mother      Heart Disease Brother 15        MI at 15, 16.      Diabetes Maternal Uncle      Hypertension Maternal Aunt      Hypertension Maternal Uncle      Arthritis Brother         he passed away, had severe arthritis at age 11     Arthritis Maternal Uncle      Eye Disorder Maternal Uncle         cataracts     Neurologic Disorder Sister         migraines     Neurologic Disorder Sister         migraines     Respiratory Son         asthma     Cerebrovascular Disease Maternal Uncle      C.A.D. Brother      Family History Negative Other         neg for RA, SLE     Unknown/Adopted No family hx of         unknown neurological issues in her family, mother was adopted     Skin Cancer No family hx of         No known family hx of skin cancer     Social History     Socioeconomic History     Marital status: Single     Spouse name: Not on file     Number of children: 1     Years of education: Not on file     Highest education level: Not on file   Occupational History     Employer: NONE    Social Needs     Financial resource strain: Not on file     Food  insecurity:     Worry: Not on file     Inability: Not on file     Transportation needs:     Medical: Not on file     Non-medical: Not on file   Tobacco Use     Smoking status: Former Smoker     Packs/day: 0.20     Years: 1.00     Pack years: 0.20     Types: Cigarettes     Last attempt to quit: 2/1/2016     Years since quitting: 3.0     Smokeless tobacco: Never Used   Substance and Sexual Activity     Alcohol use: No     Alcohol/week: 0.0 oz     Drug use: No     Sexual activity: Not Currently   Lifestyle     Physical activity:     Days per week: Not on file     Minutes per session: Not on file     Stress: Not on file   Relationships     Social connections:     Talks on phone: Not on file     Gets together: Not on file     Attends Tenriism service: Not on file     Active member of club or organization: Not on file     Attends meetings of clubs or organizations: Not on file     Relationship status: Not on file     Intimate partner violence:     Fear of current or ex partner: Not on file     Emotionally abused: Not on file     Physically abused: Not on file     Forced sexual activity: Not on file   Other Topics Concern     Parent/sibling w/ CABG, MI or angioplasty before 65F 55M? Yes   Social History Narrative    Balanced Diet - sometimes    Osteoporosis Prevention Measures - Dairy servings per day: 2 servings weekly    Regular Exercise -  Yes Describe walking 4 times a week    Dental Exam - NO    Seatbelts used - Yes    Self Breast Exam - Yes    Abuse: Current or Past (Physical, Sexual or Emotional)- No    Do you have any concerns about STD's -  No    Do you feel safe in your environment - No    Guns stored in the home - No    Sunscreen used - Yes    Lipids -  YES - Date: 053102    Glucose -  YES - Date: 012804    Eye Exam - YES - Date: one year ago    Colon Cancer Screening - No    Hemoccults - NO    Pap Test -  YES - Date: 070904, remote history of LEEP    Mammography - YES - Date: last spring, would like to  discuss, needs a referral to [facility]    DEXA - NO    Immunizations reviewed and up to date - Yes, last td given in  or      Patient Active Problem List   Diagnosis     Seasonal affective disorder (H)     Allergic rhinitis     PCOS (polycystic ovarian syndrome)     Moderate persistent asthma     Chronic pancreatitis (H)     Hypertension goal BP (blood pressure) < 140/90     Common migraine     NSTEMI (non-ST elevated myocardial infarction) (H)     Hyperlipidemia LDL goal <70     ASCVD (arteriosclerotic cardiovascular disease)     GERD (gastroesophageal reflux disease)     Ischemic cardiomyopathy     Hypertensive heart disease     Uterine leiomyoma     Iron deficiency anemia     Costochondritis     Vitamin D deficiency     Breast cancer screening     Spinal stenosis in cervical region     Fibromyalgia     Seronegative rheumatoid arthritis (H)     Type 2 diabetes, HbA1C goal < 8% (H)     Type 2 diabetes mellitus with other specified complication (H)     Hyperlipidemia associated with type 2 diabetes mellitus (H)     Hypertension associated with diabetes (H)     Overweight with body mass index (BMI) 25.0-29.9     Tobacco use disorder     Telogen effluvium     CAD S/P percutaneous coronary angioplasty     Status post coronary angiogram     ANCA-associated vasculitis (H)     Wegener's vasculitis (H)       Pregnancy Hx: She is . All misscarriages were in first trimester. H/o OC use in the past. 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    PMHx, FHx, SHx were reviewed, unchanged.      Outpatient Encounter Medications as of 3/6/2019   Medication Sig Dispense Refill     acetaminophen (TYLENOL) 325 MG tablet Take 1-2 tablets (325-650 mg) by mouth every 6 hours as needed for pain (headache) 250 tablet 0     albuterol (2.5 MG/3ML) 0.083% neb solution INL 1 VIAL VIA NEBULIZATION Q 4 TO 6 HOURS PRN  1     albuterol (PROAIR HFA, PROVENTIL HFA, VENTOLIN HFA) 108 (90 BASE) MCG/ACT  inhaler Inhale 2 puffs into the lungs every 6 hours as needed for shortness of breath / dyspnea or wheezing 3 Inhaler 1     ASPIRIN LOW DOSE 81 MG EC tablet Take 1 tablet (81 mg) by mouth daily 90 tablet 3     BASAGLAR 100 UNIT/ML injection Inject 40 Units Subcutaneous daily 12 mL 2     blood glucose monitoring (NO BRAND SPECIFIED) meter device kit Use to test blood sugar 4 X times daily or as directed. 1 kit 0     blood glucose monitoring (NO BRAND SPECIFIED) test strip 1 strip by In Vitro route 4 times daily Test as directed. Please dispense three months and three months of refills. Thank you. 360 each 3     blood glucose monitoring (ONE TOUCH DELICA) lancets Use to test blood sugars 4 times daily or as directed. 4 Box 3     calcium carbonate (TUMS) 500 MG chewable tablet Take 3-4 chew tab by mouth daily as needed.       clopidogrel (PLAVIX) 75 MG tablet Take 1 tablet (75 mg) by mouth daily 90 tablet 2     COMPRESSION STOCKINGS 2 each daily Apply one 10-15 mmHg compression stocking to each lower extgmierty during the day and remove before bedtime. 4 each 2     cyclobenzaprine (FLEXERIL) 10 MG tablet Take 1 tablet (10 mg) by mouth 2 times daily as needed for muscle spasms 180 tablet 0     cycloSPORINE (RESTASIS) 0.05 % ophthalmic emulsion Place 1 drop into the right eye every 12 hours       desonide (DESOWEN) 0.05 % cream Apply topically 2 times daily       dicyclomine (BENTYL) 20 MG tablet TAKE 1 TABLET(20 MG) BY MOUTH FOUR TIMES DAILY AS NEEDED 60 tablet 3     diphenhydrAMINE (BENADRYL) 25 MG capsule TK 1 TO 2 CS PO QHS  4     doxycycline hyclate (VIBRA-TABS) 100 MG tablet        EPIPEN 2-RIKY 0.3 MG/0.3ML injection INJECT 0.3 MG INTO THE MUSCLE PRF ANAPHYALAXIS  0     ferrous gluconate (FERGON) 324 (38 Fe) MG tablet Take 1 tablet (324 mg) by mouth 2 times daily (with meals) 180 tablet 3     fexofenadine (ALLEGRA) 180 MG tablet Take 1 tablet by mouth daily as needed. 90 tablet 3     fluocinolone (SYNALAR) 0.01 %  solution Apply topically daily as needed       fluocinonide (LIDEX) 0.05 % external solution Apply topically 2 times daily To areas of itch on the scalp as needed. 60 mL 11     fluticasone-vilanterol (BREO ELLIPTA) 100-25 MCG/INH inhaler Inhale 1 puff into the lungs daily       folic acid (FOLVITE) 1 MG tablet Take 1 tablet by mouth daily 90 tablet 3     hydroxychloroquine (PLAQUENIL) 200 MG tablet Take 2 tablets (400 mg) by mouth daily 180 tablet 1     insulin pen needle (BD MARCK U/F) 32G X 4 MM USE DAILY OR AS DIRECTED 300 each 3     ketoconazole (NIZORAL) 2 % cream Apply topically as needed   3     ketoconazole (NIZORAL) 2 % shampoo Apply topically daily as needed       lifitegrast (XIIDRA) 5 % opthalmic solution Place 1 drop into both eyes 2 times daily 20 each 3     lisinopril-hydrochlorothiazide (PRINZIDE/ZESTORETIC) 20-25 MG tablet Take 1 tablet by mouth daily 90 tablet 2     Magnesium Oxide -Mg Supplement 250 MG TABS TK 1 T PO BID. REDUCE IF STOOLS LOOSEN  11     metFORMIN (GLUCOPHAGE-XR) 500 MG 24 hr tablet TAKE 2 TABLETS(1000 MG) BY MOUTH DAILY WITH DINNER 180 tablet 0     metoclopramide (REGLAN) 10 MG tablet Take 1 tablet (10 mg) by mouth 4 times daily (before meals and nightly) 90 tablet 0     metoprolol succinate ER (TOPROL-XL) 100 MG 24 hr tablet Take 1 tablet (100 mg) by mouth daily 90 tablet 2     metroNIDAZOLE (NORITATE) 1 % cream Apply topically daily       montelukast (SINGULAIR) 10 MG tablet Take 1 tablet (10 mg) by mouth At Bedtime 30 tablet 1     Multiple Vitamin (DAILY-GERALD) TABS Take 1 tablet by mouth daily  0     nitroGLYcerin (NITROSTAT) 0.4 MG sublingual tablet Place 1 tablet (0.4 mg) under the tongue every 5 minutes as needed for chest pain 25 tablet 1     nystatin (MYCOSTATIN) 566017 UNITS TABS tablet TK 2 TS PO BID  0     olopatadine (PATANOL) 0.1 % ophthalmic solution Place 1 drop into both eyes 2 times daily as needed for allergies. 5 mL 12     ondansetron (ZOFRAN-ODT) 8 MG ODT tab  Take 1 tablet (8 mg) by mouth every 8 hours as needed for nausea 60 tablet 1     Ondansetron HCl (ZOFRAN PO)        pantoprazole (PROTONIX) 40 MG EC tablet Take 1 tablet (40 mg) by mouth daily Pork free tablets. 30 tablet 1     pravastatin (PRAVACHOL) 40 MG tablet Take 1 tablet (40 mg) by mouth daily 90 tablet 2     ranitidine (ZANTAC) 150 MG tablet Take 1 tablet (150 mg) by mouth 2 times daily 180 tablet 1     spironolactone (ALDACTONE) 50 MG tablet Take 1 tablet (50 mg) by mouth daily . Take additional 0.5 tablets by mouth once daily as needed for weight gain. 90 tablet 2     sucralfate (CARAFATE) 1 GM tablet Take 1 tablet (1 g) by mouth 4 times daily 120 tablet 3     traMADol (ULTRAM) 50 MG tablet Take 1 tablet (50 mg) by mouth every 8 hours as needed for moderate pain 60 tablet 3     Triamcinolone Acetonide (NASACORT ALLERGY 24HR NA)        vitamin D (ERGOCALCIFEROL) 85154 UNIT capsule Take 1 capsule (50,000 Units) by mouth every 7 days Need a Vitamin D level in 2 months. (Patient taking differently: Take 50,000 Units by mouth every 7 days Need a Vitamin D level in 2 months.) 8 capsule 0     [] lidocaine (viscous) 15 mL, alum & mag hydroxide-simethicone 15 mL GI Cocktail Take 30 mLs by mouth once for 1 dose 30 mL 0     lidocaine (viscous), alum & mag hydroxide-simethicone GI Cocktail 30 ml times on po now (Patient not taking: Reported on 2018) 30 mL 0     lidocaine (XYLOCAINE) 5 % ointment Apply 1 g topically 2 times daily as needed for moderate pain (Patient not taking: Reported on 2018) 50 g 5     [] order for DME 1 Device once for 1 dose Equipment being ordered: Wedge pillow 1 each 0     No facility-administered encounter medications on file as of 3/6/2019.        Allergies   Allergen Reactions     Amoxicillin-Pot Clavulanate      Augmentin      Unknown possible hives and edema     Azithromycin      Diatrizoate Other (See Comments)     Pt wants this listed because she is allergic to  "shellfish      Imitrex [Sumatriptan]      Severe face/neck/chest tightness and flushing side effects      Penicillins Hives     Unknown      Pork Allergy      Stomach pain, cramping, diarrhea, itching, nausea and headaches     Shellfish Allergy Hives and Swelling     Sulfa Drugs Hives and Swelling     Zithromax [Azithromycin Dihydrate] Swelling     Unknown          Ph.E:    Vitals:    03/06/19 1205   BP: 134/81   Pulse: 93   Temp: 98.5  F (36.9  C)   TempSrc: Oral   SpO2: 100%   Weight: 77.6 kg (171 lb)   Height: 1.6 m (5' 3\")           Constitutional: WD/WN. Pleasant. In no acute distress.   Eyes: Swelling around R eye significantly improved since last visit. EOMI  HEENT: No oral ulcers or thrush. Normal salivary pool.  Neck: No cervical LAP, + thyromegaly  Chest: Clear to auscultation bilaterally  CV: RRR, no murmurs/ rubs or gallops. No edema, clubbing or cyanosis.   GI: Abdomen is soft and non tender.  MS: No synovitis or joint tenderness. Cool joints. No joint deformities. Full ROM of the joints. No nodules. +Fibromyalgia trigger points.  Skin: No livedo, periungual erythema, digital ulcers or nail changes. + alopecia with scales, facial malar erythema (looks like seborrhoic dermatitis).  Neuro: A&O x 3. Grossly non focal, muscular power 5/5 in all ext  Psych: NL affect and mood    Assessment/ plan:    1-Seropositive non-erosive RA (arthritis, AM stiffness, high CRP, RF 26 but re-check neg 3/2015 on HCQ) Dx 5/2013, FMS, new pleuritic CP 3/20-15-5/2015 resolved on steroids. GPA. She is on  mg bid since 5/2014. She tolerates it well and it's helping some but not enough to control all her pain. Continues having flare ups of joint pain, swelling also has FMS. Joint sx are getting worse. Had high ESR/CRP in 3/2015 and new pleuritic CP between 3/2015-5/2015 which resolved after taking medrol dose pack prescribed 5/20/2015. Her pleuritic CP could be related to her RA. Then stopped tramadol as it blames it for " recent episodes of CP.    In the past, MTX was recommended, she declined.    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 Etiology of mass unclear, but it's inflammatory and related to pt's underlying autoimmune disease (inflammatory arthritis, serositis). 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    Her work up showed borderline + ALLI and slightly elevated MPO. I told her prednisone is not good for her diabetes and weight gain. Recommended a trial of MTX as next step. Discussed risks on details. I called her neuro-ophthalmologist at last visit who agreed with trial of MTX (she suspects pseudo-sarcoidosis or sarcoid like disease but no granuloma and ACE not elevated).     Unfortunately despite all my efforts to explain risks vs benefits (more than risks) of MTX as steroid sparing gent, Janine declined to try MTX. I was very concerned about her R orbital inflammatory mass as there is high chance of flare up off steroids and steroid causes her weigh gain and uncontrolled diabetes. I even offered her other steroid sparing gents like imuran. She declined that as well.    Her inflammation around R orbit has recurred now. On 4/7/2017, I spent quite amount of time discussing a trial of MTX. After discussing risks/benefits, she agreed to try it.     She is s/p Tx with MTX 10 mg po qwk 4/2017-5/2017. No benefits and more GI SEs, more hair loss and headaches since start of MTX. No benefits. I called and spoke with her neuro-ophthalmologist who recommended a second opinion from neuro-ophthalmology at the . I suspect her presentation to be ANCA vasculitis related but wanted to repeat imaging and get neuro-ophthalmology eval here. She was recommended to have repeat biopsy to r/o lymphoma.    In 5/2017,  prescribed her prednisone 40-30-20-10 mg a day each for 3 days then stop (she declined higher dose and longer taper despite my concern for worsening swelling around orbit), Her R campos-orbital edema significantly improved. MTX was stopped in 5/2017 given side effects. Plan was to start imuran 50 mg po qd (NL TPMT); however we decided to hold off till she gets biopsy done.    Repeat R lacrimal gland biopsy in 8/2017 showed non specific inflammation, it ruled out lymphoma. Her R orbital swelling is improved but still present. After her biopsy I contacted her to schedule a f/u visit with me to discuss plan of care but she declined till today's visit.    She did not tolerate AZA because of GI SEs.    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     This is definitely an inflammatory process and is steroid responsive, she had local kenalog inj in 8/2017 at the time of biopsy which has helped. Given her diabetes and long term SE of prednisone, highly recommend steroid sparing agent to prevent progression/recurrence of R campos-orbital swelling. Since she did not tolerate MTX and AZA, recommend rituximab as next step.     In 2/2018, I spent 30 minutes discussing test results, plan of care, rituximab SEs including rare risk of PML. She declined rituximab with concern for SEs.    Unfortunately lost f/u sine 2/2018. In 9/2018, was back with her R eye being much worse, swelling around R orbit was severe and is pushing down her eye. She decided to see an ophthalmologist at [place], was seen 8/29, was told to have GPA.    Janine is frustrated with her eye condition, saying nobody told her that she has GPA or Wegener's which is a serious condition and people could die from it.    I reminded her that I discussed  the Dx of ANCA-vasculitis which is just another name for Wegener's with her many times but she always declined induction Tx rituximab at last visit in 9/2018. I put her on prednisone 30 mg every day in 9/2018, now she is off, stopped 6 wk after surgery. Does not like SEs including worsening BG.    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 Shah and Dr. Valdez at [place]. 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 Not happy with results, on my exam, her eye swelling/pain significantly improved. She wants to transfer care to here, I advised her to make an appointment with Dr. Saulo GREEN for f/u and referred her to Dr. Vasquez to assess if she has sinus involvement by GPA given facial pain.    After my original recommendation to receive rituximab (FDA approved for GPA) in 2/2018. She finally agreed to it, had 1 gr q2wk x 2 on 12/12/2018 and 12/26/2018. Had minor allergic reaction which was managed by extra dose of steroid and benadryl. She refuses to do prednisone taper given h/o diabetes, GIB on prednisone. I told her it would take 1 mo for rituximab to show benefit, if she continues to have active disease, recommend trial of cytoxan. Will check labs next week.      CT chest/abdomen/pelvis 4/2017 was neg for malignancy. 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 Repeat MPO was positive in 6/2017.    Continue accupuncture (referral was placed as it helps with chronic pain).     My impression is that her arthralgias are a combination of IA, fibromyalgia and chronic pain.  Stopped HCQ at last visit, shortly after resumed taking it as arthralgia recurred. Continue HCQ. Eye exam is updated.      2-Fad pads. She was seen by endocrinology and cushing was ruled out in 4/2014. Was advised to do f/u for enlarged thyroid/thyroid nodules.    3-Hair loss. F/U with dermatology    4-Chronic pain. F/U with pain clinic    5-Concerns of subclinical hyperthyroidism: TSH is barely minimal, chart review shows  that those lowest levels are since April 2014. At last visit, discussed Endocrinology ref which pt wants to hold off in this visit.     6-Vit D deficiency. Was replaced with vit D 50, 000 units po qwk x 12 wk then 2000 units qd      PLAN: Follow up 3/6 add on    MEDICATION CHANGES: none      No orders of the defined types were placed in this encounter.                          HPI      ROS      Physical Exam        Majo Mckinnon MD

## 2019-03-06 NOTE — PROGRESS NOTES
Desiree Godinez, Dr. Mckinnon's RN CC, was wondering if patient could be seen in the clinic today.  [phone]  FOV with Dr. Vasquez 05/06/2019    I reviewed new patient with Dr. Vasquez.  She reviewed the scan and stated patient's sinus are clear; however, some dental concerns are noted.  She recommends patient to follow up with dental, keep FOV with Dr. Vasquez and be placed on the wait/cancellation list.    I called and informed Tamra [phone] per Desiree's direction.    I encouraged them to call with any questions or concerns.

## 2019-03-06 NOTE — Clinical Note
Dominguez Vernon, are you able to look at her CT and see if there is any progression of the pseudotumor? Thank you

## 2019-03-06 NOTE — LETTER
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 Cornell is a 49 yo AAF who was referred to our clinic for evaluation and management of her joint pain in setting of positive RF 26.    Her joint symptoms first started more than a year ago, but over last 6-8 months fluctuating some good and bad days . All her joints are involved. Over last 5 months, hands became swollen, warm and painful. Tylenol does not help. Ketoprofen did not help. Was told to avoid NSAIDs given ACS. Reports AM/PM stiffness x 2-3 hr, can't make full fist when wakes up in AM.    She feels tired all the time. Reports worsening hair loss and unchanged  facial rash. Gets red sore flaky rash over cheeks across her face which is intermittent diagnosed Rosacea and is prescribed metronidazole gel which she has not started using. Reports occasional oral ulcers. Hands feel cold with red discoloration last winter. Has occasional dysphagia to both solids and liquid. Has dry eyes, is using OTC allergy eyedrops. Has chronic abdominal pain. Has h/o pancreatitis. Sometime has anxiety. Occasionally has numbness, tingling in fingers/toes. Has back and spine issues, her whole back and neck hurts, different areas at different time. She is wondering if she has FMS. Has hard time sleeping, has never been diagnosed with BRENNA. She does not know if he snores. She was found to have slightly positive RF in 2/2013. Has microcytic anemia.     Her arthralgia gets worse with drop in temperature. Reports body ache. Activity makes her pain worse. Her joint swelling isintermittent. Her body pain and joint pain is the same. Reports AM stiffness x 3 hr.    She has been doing acupuncture x few months and it is helping with her pain. She thinks that the  combination of plaquenil and acupuncture is helpful but overall she notice 30% in her symptoms relief.     Takes tylenol and tramadol on occasion for pain.    She decided to use different formulation of metformin which helped with her abdominal pain. I referred her to GI for evaluation of elevated lipase and abdominal pain. She decided to see GI in the future.       She is on  mg qd since 5/2014, tolerates it well and it helps her some. Denies taking any gabapentin due to chest pain and headches. She has had referral her for water aerobics, but she can't begin until she's healed from recent surgery in 10/2014. She thinks HCQ is helping but not enough to control all her pain, reports 2-3 hr of AM stiffness with ongoing diffuse arthralgia/myalgia along with intermittent swelling of hands. She does see her acupuncture person and it helps with her pain, has not started water aerobics yet. Has got her flu shot.       10/2015: Reports having pleuritic CP in 3/2015, was prescribed Lidoderm patches first. It did not help. Took prednisone (?dose) by her own, pain got better but it recurred off prednisone and gradually got worse to the point that she could not stand the pain anymore, was seen in ER in 5/2015, was treated with medrol dose pack which helped. Reports pain over hips, knees, ankles and fingers. Pain gets worse with walking. Reports myalgia, swelling of the arms. Pain over neck, shoulders. Has AM stiffness x 3-4 hr. Fingers swell up and become painful. Can't remember if steroid helped with joint pain. She had headaches, severe CP when she took tramadol and gabapentin and stopped taking them, sx resolved but she can't tell which one caused SEs but thinks that probably it was gabapentin. She has good days and tries to be more active but activity aggravates the pain. Headaches are intermittent. HCQ helps some not enough to control all the pain. No HCQ SEs. Had eye exam around July 2015. Flexeril helps but PCP  wants to change flexeril to zanaflex, reporting that she is NOT comfortable with the change. Acupuncture still helps an she continued to  do that. Concerned about fat pads she has in supraclavicular area, has h/o thyroid nodules. Continues having hair loss, takes iron for iron deficiency.     2/2016: She has 2 major complaints today, increased joint pain/swelling in hands/feet and recent Dx of optic neuritis in R eye, reports having similar problem about 20 yr ago, now recurred. Has a spot in R eye vision x long term, was seen by ophthalmology few days ago and was told to have optic neuritis. Gets joint pain, muscle pain. Can't  objects, hands are swollen. Knees, hips and ankles are painful. Reports more arthralgia. AM stiffness/ pain is 3-4 hr. She wants me to talk to her ophthalmologist directly.      She had botox inj for migraines which did not help her. She is going to have angiogram next wk, had to take more nitro for CP recently.       4/29/2016: She reports being on steroid taper x 3 since last visit. Reportedly, had orbit MRI, it showed swelling/inflamamtion of R lacrimal glands. She had painful swelling of her R eye, cause her headaches. Botox inj made her headaches worse. She is being dealing with this since 1/2016, with severe headaches and pain/swelling around R eye. Had biopsy in 3/2016. She is frustrated as prednisone causes her weight gain and her BG is difficult to control on it.    5/2016: At last visit, was prescribed MTX 10 mg po qwk to take along with FA 1 mg qd. She decided not to take MTX, is afraid of side effects, believes all these medications would harm her. She also believes that botox caused her inflammation around R eye. No major flare up of per-orbital inflamamtion since last visit but continues to have swelling around her R eye.      11/2016: Reports diffuse body ache, arthralgia, myalgia especially with weather change. No major flare up of campos-orbital inflammation but her  face/around R eye swells up from time to time. Reports 2 episodes of CP over past 2 mo, nitro sometimes helps and sometimes does not help. She has asthma and costochondritis and she has hard time to distinguish the origin of pain. Sometimes knees and fingers swell up. Her stiffness is sometimes all day.    4/2017:  Reports having sinusitis since last visit. Her R eye is dry and is using eyedrops to keep it moist. Reports having pain in ears. Was treated with antibiotics by PCP, it got better then it got worse. Reports catching infections easily. Then started having pressure over eyes with pain/headaches (exact start date is unknown). Pain gradually got worse and became persistent. Had sinus CT.     4/7/2017: Having a flare up of swelling, pain around her R orbit. Has not tried MTX yet.      5/2017: On MTX 10 mg po qwk since 4/7/2017, reports increased stomach pain and nausea and new headaches since start of MTX and it's not helping. Pain/swelling around R orbit is worse.    6/2017: She finished prednisone taper prescribed at last visit, R campos-orbital swelling significantly improved. 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    2/2018: R campos-orbital swelling has improved. Repeat R lacrimal gland biopsy in 8/2017 showed non specific inflammation with no lymphoma. She is off steroid. Did not tolerate AZA due to N/V and abdominal pain.      Is back with recurrence of R campos-orbital sweling x past 2 months. Pain really got worse over past 3wk, requries       9/2018: Declined ritiximab at last visit, lost f/u since 2/2018. Went to [place] and was seen on 8/29 by Dr. 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 Reportedly he ordered labs and recommended surgery since the inflammation of the R eye is back and is pushing the eye down. Janine took  some prednsione 30 mg every day few days a go for pain.    Today: She had lateral orbitotomy and debulking orbital mass right eye on 9/26/18 with Dr. Shah and Dr. Valdez at [place]. 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 There were no complications according to the op note.    Janine finally agreed to receive rituximab for her GPA. She had it as 1 gr q2wk x 2 on 12/12/2018 and 12/26/2018. Had minor allergic reaction which was managed by IV solu cortef and benadryl. She is off prednisone about 6wk after surgery. Had bleeding ulcer from prednsione. Has pain over sinus, ears. Still has residual pain, swelling around her R eye is concerned about it. Wants to transfer her care to ophthalmology here as it's closer to her.    Cold induced sweating congestion joint pain    Facial swelling    allegy doctor clear ear pft ok doxy sinusitis    Her records were reviewed.        Component      Latest Ref Rng 2/28/2013 2/28/2013          12:14 PM 12:14 PM   WBC      4.0 - 11.0 10e9/L     RBC Count      3.8 - 5.2 10e12/L     Hemoglobin      11.7 - 15.7 g/dL     Hematocrit      35.0 - 47.0 %     MCV      78 - 100 fl     MCH      26.5 - 33.0 pg     MCHC      31.5 - 36.5 g/dL     RDW      10.0 - 15.0 %     Platelet Count      150 - 450 10e9/L     Specimen Description           Lyme Screen IgG and IgM           Vitamin D Deficiency screening      30 - 75 ug/L     Ferritin      10 - 300 ng/mL     Sed Rate      0 - 20 mm/h     ALLI Screen by EIA      <1.0     Rheumatoid Factor      0 - 14 IU/mL     CK Total      30 - 225 U/L  78   Uric Acid      2.5 - 7.5 mg/dL 6.7      Component      Latest Ref Rng 2/28/2013          12:14 PM   WBC      4.0 - 11.0 10e9/L    RBC Count      3.8 - 5.2 10e12/L    Hemoglobin      11.7 - 15.7 g/dL    Hematocrit      35.0 - 47.0 %    MCV      78 - 100 fl    MCH      26.5 - 33.0 pg    MCHC      31.5 - 36.5 g/dL    RDW      10.0 - 15.0 %    Platelet Count      150 - 450 10e9/L    Specimen  Description       Serum   Lyme Screen IgG and IgM       Test value: <0.75....Interpretation: Negative....If you highly suspect Lyme . . .   Vitamin D Deficiency screening      30 - 75 ug/L    Ferritin      10 - 300 ng/mL    Sed Rate      0 - 20 mm/h    ALLI Screen by EIA      <1.0    Rheumatoid Factor      0 - 14 IU/mL    CK Total      30 - 225 U/L    Uric Acid      2.5 - 7.5 mg/dL      Component      Latest Ref Rn 2/28/2013 2/28/2013 2/28/2013 2/28/2013          12:14 PM 12:14 PM 12:14 PM 12:14 PM   WBC      4.0 - 11.0 10e9/L       RBC Count      3.8 - 5.2 10e12/L       Hemoglobin      11.7 - 15.7 g/dL       Hematocrit      35.0 - 47.0 %       MCV      78 - 100 fl       MCH      26.5 - 33.0 pg       MCHC      31.5 - 36.5 g/dL       RDW      10.0 - 15.0 %       Platelet Count      150 - 450 10e9/L       Specimen Description             Lyme Screen IgG and IgM             Vitamin D Deficiency screening      30 - 75 ug/L       Ferritin      10 - 300 ng/mL    10   Sed Rate      0 - 20 mm/h   23 (H)    ALLI Screen by EIA      <1.0  <1.0 . . .     Rheumatoid Factor      0 - 14 IU/mL 26 (H)      CK Total      30 - 225 U/L       Uric Acid      2.5 - 7.5 mg/dL         Component      Latest Ref [facility] 2/28/2013 2/28/2013          12:14 PM 12:14 PM   WBC      4.0 - 11.0 10e9/L 14.1 (H)    RBC Count      3.8 - 5.2 10e12/L 4.55    Hemoglobin      11.7 - 15.7 g/dL 10.7 (L)    Hematocrit      35.0 - 47.0 % 33.3 (L)    MCV      78 - 100 fl 73 (L)    MCH      26.5 - 33.0 pg 23.5 (L)    MCHC      31.5 - 36.5 g/dL 32.1    RDW      10.0 - 15.0 % 18.1 (H)    Platelet Count      150 - 450 10e9/L 407    Specimen Description           Lyme Screen IgG and IgM           Vitamin D Deficiency screening      30 - 75 ug/L  32   Ferritin      10 - 300 ng/mL     Sed Rate      0 - 20 mm/h     ALLI Screen by EIA      <1.0     Rheumatoid Factor      0 - 14 IU/mL     CK Total      30 - 225 U/L     Uric Acid      2.5 - 7.5 mg/dL          MRI CERVICAL SPINE  WITHOUT CONTRAST 4/3/2013 12:47 PM    HISTORY: Cervicalgia. Degeneration of cervical intervertebral disc.  MRI cervical spine. Evaluate bilateral supraclavicular lymph nodes.  Clinical enlargement.    TECHNIQUE: Multiplanar multisequence MRI of the cervical spine  without gadolinium contrast.    COMPARISON: None.    FINDINGS: The patient has a developmentally small central canal.  Images of the cervical cord reveals small areas of T2 hyperintensity  within the cervical cord. There is a small area of high signal  intensity at the C1 level. There is also a small area of high signal  intensity at the C6-C7 level. The area of high signal at C6-C7 is  located at an area of central spinal stenosis. This may be due to  myelomalacia. The area of high signal at C1-C2 is indeterminate.    C2-C3: Normal disc, facet joints, spinal canal and neural foramina.    C3-C4: Normal disc, facet joints, spinal canal and neural foramina.    C4-C5: Normal disc, facet joints, spinal canal and neural foramina.    C5-C6: There is degeneration of the disc. There is a mild annular  disc bulge. The central canal is mild-moderately narrowed. The  residual AP diameter of the central spinal canal measures  approximately 9 mm.    C6-C7: There is degeneration of the disc. There is loss of disc space  height. There is a diffuse annular disc bulge. There are associated  posterior osteophytes. There are severe central spinal stenosis at  this level. The residual AP diameter of the central spinal canal is  only about 6 mm. There is significant flattening of the cord. There  is high signal in the cord at this level consistent with  myelomalacia. There is moderate to severe right foraminal stenosis  due to and uncinate spur.    C7-T1: Normal disc, facet joints, spinal canal and neural foramina.            Result Impression       IMPRESSION:  1. Severe central spinal stenosis at C6-C7 due to a developmentally  small canal and due to a bulging disc and  associated posterior  osteophyte. There is flattening of the cord at this level and high  signal in the cord at this level consistent with myelomalacia.  2. There is a small, indeterminate 2-3 mm area of high signal  intensity in the cord at the C1 level. This could be due to gliosis  secondary to a previous infectious or inflammatory process.  Demyelinating disease could also have a similar appearance. Clinical  correlation suggested.    GAIL MORE MD     MRI LEFT UPPER EXTREMITY NON-JOINT WITHOUT CONTRAST, MRI RIGHT UPPER  EXTREMITY NON-JOINT WITHOUT CONTRAST April 3, 2013 1:32 PM    HISTORY: Cervicalgia. Degeneration of cervical intervertebral disc.    TECHNIQUE: Multiplanar, multisequence imaging of the brachial plexus  was performed without gadolinium contrast enhancement.    COMPARISON: None.    FINDINGS: No mass lesions are seen. No inflammatory process is  identified. The roots, trunks, branches, and divisions of both the  right and left brachial plexus appear normal. No adenopathy is seen.  The anterior scalene muscles and the middle scalene muscles appear  normal. Nodules are seen within the thyroid gland. The largest nodule  is seen in the left lobe of the thyroid. This measures about 1.8 cm  in diameter.            Result Impression       IMPRESSION:  1. Both the right and left brachial plexus regions appear normal.  2. Thyroid nodules. The largest nodule is in the left lobe of the  thyroid. It measures about 1.8 cm in diameter.       XR WRIST BILATERAL G/E 3 VIEWS    Narrative:        EXAMINATION:  1. Each hand 3 views  2. Each wrist 3 views     DATE: 5/1/2013     HISTORY: Arthropathy with concern for rheumatoid.     FINDINGS: 3 views of each hand and 3 views of each wrist are obtained  without prior for comparison. Alignment is normal. There is no  fracture or acute bone abnormality. No distinct erosions are seen.  Some spurring of the distal radial ulnar joint is noted on the right.       Impression:      IMPRESSION:  1. No evidence of an inflammatory arthropathy involving either hand  or wrist.     VIELKA GUEVARA MD         XR FOOT BILATERAL G/E 3 VIEWS    Narrative:        EXAMINATION:  1. Each foot 3 views  2. Each ankle 3 views  3. Sacroiliac joint series     DATE: 5/1/2013     HISTORY: Arthropathy; concern for rheumatoid.     FINDINGS: No prior for comparison.     A frontal and bilateral oblique evaluation of the sacroiliac joints  shows no malalignment. Some patchy sclerosis is identified along the  central aspect of both sacroiliac joints, which is favored to be  degenerative. No distinct erosions are seen. The visualized portion  of the hip joint spaces appear maintained, with no erosive changes  identified. Mild endplate spurring is noted in the lower lumbar  spine.     3 views of each foot and 3 views of each ankle show no evidence of  acute fracture or dislocation. The metatarsal phalangeal,  tarsometatarsal and intertarsal joint spaces appear maintained. No  erosions are identified. There is no sign of acroosteolysis. The  ankle mortise and talar dome are intact bilaterally. Minimal spurring  is noted along the tip of the medial malleolus bilaterally.       Impression:     IMPRESSION:  1. Patchy sclerosis identified along the central aspect of both  sacroiliac joints, which is favored to be degenerative. No distinct  erosions are seen.  2. No evidence of an inflammatory arthropathy in either foot or ankle.     VIELKA GUEVARA MD     5/2013  CBC WITH PLATELETS DIFFERENTIAL       Component Value Range    WBC 11.3 (*) 4.0 - 11.0 10e9/L    RBC Count 4.56  3.8 - 5.2 10e12/L    Hemoglobin 11.1 (*) 11.7 - 15.7 g/dL    Hematocrit 34.3 (*) 35.0 - 47.0 %    MCV 75 (*) 78 - 100 fl    MCH 24.3 (*) 26.5 - 33.0 pg    MCHC 32.4  31.5 - 36.5 g/dL    RDW 16.1 (*) 10.0 - 15.0 %    Platelet Count 315  150 - 450 10e9/L    Diff Method Automated Method      % Neutrophils 71.6  40 - 75 %    % Lymphocytes 20.9  20 - 48 %    %  Monocytes 4.3  0 - 12 %    % Eosinophils 2.8  0 - 6 %    % Basophils 0.2  0 - 2 %    % Immature Granulocytes 0.2  0 - 0.4 %    Absolute Neutrophil 8.1  1.6 - 8.3 10e9/L    Absolute Lymphocytes 2.4  0.8 - 5.3 10e9/L    Absolute Monoctyes 0.5  0.0 - 1.3 10e9/L    Absolute Eosinophils 0.3  0.0 - 0.7 10e9/L    Absolute Basophils 0.0  0.0 - 0.2 10e9/L    Abs Immature Granulocytes 0.0  0 - 0.03 10e9/L   AMYLASE       Component Value Range    Amylase 103  30 - 110 U/L   COMPREHENSIVE METABOLIC PANEL       Component Value Range    Sodium 144  133 - 144 mmol/L    Potassium 3.8  3.4 - 5.3 mmol/L    Chloride 105  94 - 109 mmol/L    Carbon Dioxide 23  20 - 32 mmol/L    Anion Gap 17  6 - 17 mmol/L    Glucose 173 (*) 60 - 99 mg/dL    Urea Nitrogen 13  5 - 24 mg/dL    Creatinine 0.83  0.52 - 1.04 mg/dL    GFR Estimate 74  >60 mL/min/1.7m2    GFR Estimate If Black 90  >60 mL/min/1.7m2    Calcium 9.7  8.5 - 10.4 mg/dL    Bilirubin Total 0.4  0.2 - 1.3 mg/dL    Albumin 4.3  3.9 - 5.1 g/dL    Protein Total 7.8  6.8 - 8.8 g/dL    Alkaline Phosphatase 66  40 - 150 U/L    ALT 36  0 - 50 U/L    AST 28  0 - 45 U/L   CK TOTAL       Component Value Range    CK Total 66  30 - 225 U/L   CRP INFLAMMATION       Component Value Range    CRP Inflammation 10.4 (*) 0.0 - 8.0 mg/L   LIPASE       Component Value Range    Lipase 353 (*) 20 - 250 U/L   ERYTHROCYTE SEDIMENTATION RATE AUTO       Component Value Range    Sed Rate 26 (*) 0 - 20 mm/h   ROUTINE UA WITH MICROSCOPIC REFLEX TO CULTURE       Component Value Range    Color Urine Yellow      Appearance Urine Slightly Cloudy      Glucose Urine 30 (*) NEG mg/dL    Bilirubin Urine Negative  NEG    Ketones Urine 5 (*) NEG mg/dL    Specific Gravity Urine 1.026  1.003 - 1.035    Blood Urine Trace (*) NEG    pH Urine 5.0  5.0 - 7.0 pH    Protein Albumin Urine 10 (*) NEG mg/dL    Urobilinogen mg/dL Normal  0.0 - 2.0 mg/dL    Nitrite Urine Negative  NEG    Leukocyte Esterase Urine Negative  NEG    Source  Midstream Urine      WBC Urine 1  0 - 2 /HPF    RBC Urine 4 (*) 0 - 2 /HPF    Squamous Epithelial /HPF Urine <1  0 - 1 /HPF    Mucous Urine Present (*) NEG /LPF    Hyaline Casts 3 (*) 0 - 2 /LPF    Calcium Oxalate Moderate (*) NEG /HPF   COMPLEMENT C3       Component Value Range    Complement C3 143  76 - 169 mg/dL   COMPLEMENT C4       Component Value Range    Complement C4 31  15 - 50 mg/dL   HEPATITIS C ANTIBODY       Component Value Range    Hepatitis C Antibody Negative  NEG   CARDIOLIPIN ANTIBODY IGG AND IGM       Component Value Range    Cardiolipin IgG Marline    0 - 15.0 GPL    Value: <15.0      Interpretation:  Negative    Cardiolipin IgM Marline    0 - 12.5 MPL    Value: <12.5      Interpretation:  Negative   LUPUS PANEL       Component Value Range    Lupus Result    NEG    Value: Negative      (Note)      COMMENTS:      The INR is normal.      APTT is normal.  1:2 Mix is not indicated.      DRVVT Screen is normal.      Thrombin time is normal.      NEGATIVE TEST; A LUPUS ANTICOAGULANT WAS NOT DETECTED IN THIS      SPECIMEN WITHIN THE LIMITS OF THE TESTING REPERTOIRE.      If the clinical picture is strongly suggestive of an antiphospholipid      syndrome, recommend anticardiolipin and beta-2-glycoprotein (IgG and      IgM) antibody tests.      Leela Franks M.D.  [phone]      5/2/2013            INR =  0.93 N = 0.86-1.14  (No additional charge)      TT = 15.7 N = 13.0-19.0 sec  (No additional charge)            APTT'S:    Seconds      Reagent =  Stago LA      Normal  =  38      Patient  =  35      1:2 Mix  =  N/A      Reference =  31-43             DILUTE MADELINE VIPER VENOM TEST:      DRVVT Screen Ratio = 0.76 Normal = <1.21         IMMUNOGLOBULIN G SUBCLASSES       Component Value Range    IGG 1030  695 - 1620 mg/dL    IgG1 488  300 - 856 mg/dL    IgG2 325  158 - 761 mg/dL    IgG3 47  24 - 192 mg/dL    IgG4 18  11 - 86 mg/dL   SUNNY ANTIBODY PANEL       Component Value Range    Ribonucleic Protein  IgG Antibody 0      Smith Antibody IgG 1      SSA (RO) Antibody IgG 4      SSB (LA) Antibody IgG 0      Scleroderma Antibody IgG 0     BETA 2 GLYCOPROTEIN ANTIBODIES IGG IGM       Component Value Range    Beta-2-Glycopro IgG 1      Beta-2-Glycopro IgM 5     CYCLIC CIT PEPT IGG       Component Value Range    Cyclic Cit Pept IgG/IgA    <20 UNITS    Value: <20      Interpretation:  Negative   DNA DOUBLE STRANDED ANTIBODIES       Component Value Range    DNA-ds    0 - 29 IU/mL    Value: <15      Interpretation:  Negative       Component      Latest Ref Rng 3/11/2014   Sodium      133 - 144 mmol/L 137   Potassium      3.4 - 5.3 mmol/L 4.6   Chloride      94 - 109 mmol/L 97   Carbon Dioxide      20 - 32 mmol/L 20   Anion Gap      6 - 17 mmol/L 20 (H)   Glucose      60 - 99 mg/dL 243 (H)   Urea Nitrogen      5 - 24 mg/dL 35 (H)   Creatinine      0.52 - 1.04 mg/dL 1.47 (H)   GFR Estimate      >60 mL/min/1.7m2 38 (L)   GFR Estimate If Black      >60 mL/min/1.7m2 46 (L)   Calcium      8.5 - 10.4 mg/dL 9.7   Bilirubin Total      0.2 - 1.3 mg/dL 0.3   Albumin      3.9 - 5.1 g/dL 4.8   Protein Total      6.8 - 8.8 g/dL 7.9   Alkaline Phosphatase      40 - 150 U/L 73   ALT      0 - 50 U/L 35   AST      0 - 45 U/L 30   Color Urine       Yellow   Appearance Urine       Clear   Glucose Urine      NEG mg/dL 500 (A)   Bilirubin Urine      NEG Negative   Ketones Urine      NEG mg/dL Negative   Specific Gravity Urine      1.003 - 1.035 1.020   Blood Urine      NEG Negative   pH Urine      5.0 - 7.0 pH 5.5   Protein Albumin Urine      NEG mg/dL Negative   Urobilinogen Urine      0.2 - 1.0 EU/dL 0.2   Nitrite Urine      NEG Negative   Leukocyte Esterase Urine      NEG Negative   Source       Midstream Urine   WBC      4.0 - 11.0 10e9/L 14.0 (H)   RBC Count      3.8 - 5.2 10e12/L 5.02   Hemoglobin      11.7 - 15.7 g/dL 12.4   Hematocrit      35.0 - 47.0 % 37.1   MCV      78 - 100 fl 74 (L)   MCH      26.5 - 33.0 pg 24.7 (L)   MCHC       31.5 - 36.5 g/dL 33.4   RDW      10.0 - 15.0 % 15.3 (H)   Platelet Count      150 - 450 10e9/L 420       Component      Latest Ref Rng 3/25/2014   Sodium      133 - 144 mmol/L 137   Potassium      3.4 - 5.3 mmol/L 3.7   Chloride      94 - 109 mmol/L 100   Carbon Dioxide      20 - 32 mmol/L 21   Anion Gap      6 - 17 mmol/L 16   Glucose      60 - 99 mg/dL 214 (H)   Urea Nitrogen      5 - 24 mg/dL 20   Creatinine      0.52 - 1.04 mg/dL 1.02   GFR Estimate      >60 mL/min/1.7m2 58 (L)   GFR Estimate If Black      >60 mL/min/1.7m2 70   Calcium      8.5 - 10.4 mg/dL 9.5   Phosphorus      2.5 - 4.5 mg/dL 3.7   Albumin      3.9 - 5.1 g/dL 4.6     Component      Latest Ref Rng 4/30/2014   CRP Inflammation      0.0 - 8.0 mg/L 10.0 (H)   Sed Rate      0 - 20 mm/h 39 (H)   Rheumatoid Factor      <20 IU/mL <20   Glucose-6-PO4 Dehydrogenase       17.7     Component      Latest Ref Rng 6/11/2014 7/15/2014   WBC      4.0 - 11.0 10e9/L 11.0    RBC Count      3.8 - 5.2 10e12/L 4.74    Hemoglobin      11.7 - 15.7 g/dL 11.8    Hematocrit      35.0 - 47.0 % 36.1    MCV      78 - 100 fl 76 (L)    MCH      26.5 - 33.0 pg 24.9 (L)    MCHC      31.5 - 36.5 g/dL 32.7    RDW      10.0 - 15.0 % 13.9    Platelet Count      150 - 450 10e9/L 434    Diff Method       Automated Method    % Neutrophils       59.2    % Lymphocytes       31.6    % Monocytes       5.9    % Eosinophils       2.6    % Basophils       0.5    % Immature Granulocytes       0.2    Absolute Neutrophil      1.6 - 8.3 10e9/L 6.5    Absolute Lymphocytes      0.8 - 5.3 10e9/L 3.5    Absolute Monoctyes      0.0 - 1.3 10e9/L 0.7    Absolute Eosinophils      0.0 - 0.7 10e9/L 0.3    Absolute Basophils      0.0 - 0.2 10e9/L 0.1    Abs Immature Granulocytes      0 - 0.4 10e9/L 0.0    Sodium      133 - 144 mmol/L 138 140   Potassium      3.4 - 5.3 mmol/L 3.9 3.8   Chloride      94 - 109 mmol/L 97 103   Carbon Dioxide      20 - 32 mmol/L 26 22   Anion Gap      6 - 17 mmol/L 14.9 15    Glucose      60 - 99 mg/dL 111 (H) 163 (H)   Urea Nitrogen      5 - 24 mg/dL 28 (H) 15   Creatinine      0.52 - 1.04 mg/dL 1.10 (H) 0.99   GFR Estimate      >60 mL/min/1.7m2 53 (L) 60 (L)   GFR Estimate If Black      >60 mL/min/1.7m2 64 72   Calcium      8.5 - 10.4 mg/dL 10.3 9.3   Bilirubin Total      0.2 - 1.3 mg/dL 0.6 0.2   Albumin      3.9 - 5.1 g/dL 5.1 4.2   Protein Total      6.8 - 8.8 g/dL 9.0 (H) 7.2   Alkaline Phosphatase      40 - 150 U/L 87 69   ALT      0 - 50 U/L 37 33   AST      0 - 45 U/L 40 26   Color Urine       Straw    Appearance Urine       Clear    Glucose Urine      NEG mg/dL Negative    Bilirubin Urine      NEG Negative    Ketones Urine      NEG mg/dL Negative    Specific Gravity Urine      1.003 - 1.035 1.005    Blood Urine      NEG Negative    pH Urine      5.0 - 7.0 pH 5.0    Protein Albumin Urine      NEG mg/dL Negative    Urobilinogen mg/dL      0.0 - 2.0 mg/dL Normal    Nitrite Urine      NEG Negative    Leukocyte Esterase Urine      NEG Negative    Source       Midstream Urine    Lipase      20 - 250 U/L 403 (H)    CRP Inflammation      0.0 - 8.0 mg/L <5.0    N-Terminal Pro Bnp      0 - 125 pg/mL  55   Hemoglobin A1C      4.3 - 6.0 %  7.0 (H)     Component      Latest Ref Rn 11/12/2014   Sodium      133 - 144 mmol/L 135   Potassium      3.4 - 5.3 mmol/L 3.8   Chloride      94 - 109 mmol/L 104   Carbon Dioxide      20 - 32 mmol/L 24   Anion Gap      3 - 14 mmol/L 7   Glucose      70 - 99 mg/dL 125 (H)   Urea Nitrogen      7 - 30 mg/dL 17   Creatinine      0.52 - 1.04 mg/dL 1.18 (H)   GFR Estimate      >60 mL/min/1.7m2 49 (L)   GFR Estimate If Black      >60 mL/min/1.7m2 59 (L)   Calcium      8.5 - 10.1 mg/dL 9.8   WBC      4.0 - 11.0 10e9/L 11.1 (H)   RBC Count      3.8 - 5.2 10e12/L 4.05   Hemoglobin      11.7 - 15.7 g/dL 9.8 (L)   Hematocrit      35.0 - 47.0 % 30.6 (L)   MCV      78 - 100 fl 76 (L)   MCH      26.5 - 33.0 pg 24.2 (L)   MCHC      31.5 - 36.5 g/dL 32.0   RDW       10.0 - 15.0 % 15.5 (H)   Platelet Count      150 - 450 10e9/L 484 (H)   CT CHEST PULMONARY EMBOLISM W CONTRAST 5/20/2015 4:57 PM  HISTORY: Pain. SOB. Elevated d-dimer.   TECHNIQUE: 65 mL Isovue 370. Axial images with coronal  reconstructions.  COMPARISON: None.  FINDINGS: Calcified granuloma with surrounding scarring in the  posterolateral left upper lobe. Triangular shaped opacity at the right  lung base in the lateral right middle lobe could represent some  scarring, atelectasis or infiltrate. There is also some scarring or  atelectasis in the posteromedial right lung base. Lungs otherwise  clear.  The pulmonary arteries are well opacified. No CT evidence for acute  pulmonary embolus. No aortic dissection.  Diffuse fatty infiltration of the liver.  IMPRESSION  IMPRESSION:   1. No pulmonary embolus identified.  2. Small focus of atelectasis, infiltrate or scarring in the lateral  right middle lobe.  3. Old granulomatous disease.  4. Otherwise negative.  SILVERIO VAZQUEZ MD  Component      Latest Ref Rng 3/27/2015   WBC      4.0 - 11.0 10e9/L 11.8 (H)   RBC Count      3.8 - 5.2 10e12/L 4.53   Hemoglobin      11.7 - 15.7 g/dL 11.0 (L)   Hematocrit      35.0 - 47.0 % 33.8 (L)   MCV      78 - 100 fl 75 (L)   MCH      26.5 - 33.0 pg 24.3 (L)   MCHC      31.5 - 36.5 g/dL 32.5   RDW      10.0 - 15.0 % 15.4 (H)   Platelet Count      150 - 450 10e9/L 419   Diff Method       Automated Method   % Neutrophils       75.3   % Lymphocytes       17.4   % Monocytes       4.4   % Eosinophils       2.5   % Basophils       0.2   % Immature Granulocytes       0.2   Absolute Neutrophil      1.6 - 8.3 10e9/L 8.9 (H)   Absolute Lymphocytes      0.8 - 5.3 10e9/L 2.1   Absolute Monoctyes      0.0 - 1.3 10e9/L 0.5   Absolute Eosinophils      0.0 - 0.7 10e9/L 0.3   Absolute Basophils      0.0 - 0.2 10e9/L 0.0   Abs Immature Granulocytes      0 - 0.4 10e9/L 0.0   Creatinine      0.52 - 1.04 mg/dL 1.12 (H)   GFR Estimate      >60 mL/min/1.7m2 52  (L)   GFR Estimate If Black      >60 mL/min/1.7m2 63   Iron      35 - 180 ug/dL 25 (L)   Iron Binding Cap      240 - 430 ug/dL 346   Iron Saturation Index      15 - 46 % 7 (L)   Albumin      3.4 - 5.0 g/dL 4.0   ALT      0 - 50 U/L 24   AST      0 - 45 U/L 15   CRP Inflammation      0.0 - 8.0 mg/L 28.0 (H)   Sed Rate      0 - 20 mm/h 81 (H)   Rheumatoid Factor      <20 IU/mL <20   Vitamin D Deficiency screening      30 - 75 ug/L 44   Ferritin      8 - 252 ng/mL 34     Component      Latest Ref Rng 7/29/2015   Sodium      133 - 144 mmol/L 137   Potassium      3.4 - 5.3 mmol/L 3.8   Chloride      94 - 109 mmol/L 106   Carbon Dioxide      20 - 32 mmol/L 23   Anion Gap      3 - 14 mmol/L 8   Glucose      70 - 99 mg/dL 148 (H)   Urea Nitrogen      7 - 30 mg/dL 17   Creatinine      0.52 - 1.04 mg/dL 1.02   GFR Estimate      >60 mL/min/1.7m2 58 (L)   GFR Estimate If Black      >60 mL/min/1.7m2 70   Calcium      8.5 - 10.1 mg/dL 9.0   Bilirubin Total      0.2 - 1.3 mg/dL 0.5   Albumin      3.4 - 5.0 g/dL 4.2   Protein Total      6.8 - 8.8 g/dL 7.4   Alkaline Phosphatase      40 - 150 U/L 64   ALT      0 - 50 U/L 23   AST      0 - 45 U/L 19     Results for FAVIO MARTINEZ (MRN [id number]) as of 10/30/2015 17:00   Ref. Range 8/21/2012 09:06 5/22/2013 14:22 4/14/2014 12:06 9/11/2014 12:35 12/4/2014 16:38   TSH Latest Range: 0.40-4.00 mU/L 0.83 0.43 0.27 (L) 0.23 (L) 0.22 (L)     Component      Latest Ref Rng 10/30/2015   WBC      4.0 - 11.0 10e9/L 13.4 (H)   RBC Count      3.8 - 5.2 10e12/L 4.76   Hemoglobin      11.7 - 15.7 g/dL 12.4   Hematocrit      35.0 - 47.0 % 37.9   MCV      78 - 100 fl 80   MCH      26.5 - 33.0 pg 26.1 (L)   MCHC      31.5 - 36.5 g/dL 32.7   RDW      10.0 - 15.0 % 14.5   Platelet Count      150 - 450 10e9/L 324   Diff Method       Automated Method   % Neutrophils       67.7   % Lymphocytes       22.7   % Monocytes       6.3   % Eosinophils       2.7   % Basophils       0.4   % Immature Granulocytes        0.2   Absolute Neutrophil      1.6 - 8.3 10e9/L 9.1 (H)   Absolute Lymphocytes      0.8 - 5.3 10e9/L 3.1   Absolute Monoctyes      0.0 - 1.3 10e9/L 0.9   Absolute Eosinophils      0.0 - 0.7 10e9/L 0.4   Absolute Basophils      0.0 - 0.2 10e9/L 0.1   Abs Immature Granulocytes      0 - 0.4 10e9/L 0.0   Creatinine      0.52 - 1.04 mg/dL 1.21 (H)   GFR Estimate      >60 mL/min/1.7m2 47 (L)   GFR Estimate If Black      >60 mL/min/1.7m2 57 (L)   Iron      35 - 180 ug/dL 70   Iron Binding Cap      240 - 430 ug/dL 428   Iron Saturation Index      15 - 46 % 16   Albumin      3.4 - 5.0 g/dL 4.6   ALT      0 - 50 U/L 29   AST      0 - 45 U/L 21   CRP Inflammation      0.0 - 8.0 mg/L <2.9   Sed Rate      0 - 20 mm/h 8   Vitamin D Deficiency screening      20 - 75 ug/L 46   Ferritin      8 - 252 ng/mL 18   TSH      0.40 - 4.00 mU/L 0.49   T4 Free      0.76 - 1.46 ng/dL 1.06   Free T3      2.3 - 4.2 pg/mL 2.8     Component      Latest Ref Rng 11/17/2015   Testosterone Total      8 - 60 ng/dL 19   Sex Hormone Binding Globulin      30 - 135 nmol/L 32   Free Testosterone Calculated      0.11 - 0.58 ng/dL 0.34   Vitamin A       0.61   Retinol Palmitate       <0.02 . . .   Vitamin A Interp       Normal . . .   Thyroglobulin Antibody      <40 IU/mL <20   Thyroid Peroxidase Antibody      <35 IU/mL 31   DHEA Sulfate      35 - 430 ug/dL 101   Zinc       68     Component      Latest Ref Rng 1/27/2016   Sodium      133 - 144 mmol/L 135   Potassium      3.4 - 5.3 mmol/L 4.0   Chloride      94 - 109 mmol/L 104   Carbon Dioxide      20 - 32 mmol/L 24   Anion Gap      3 - 14 mmol/L 7   Glucose      70 - 99 mg/dL 88   Urea Nitrogen      7 - 30 mg/dL 20   Creatinine      0.52 - 1.04 mg/dL 1.13 (H)   GFR Estimate      >60 mL/min/1.7m2 51 (L)   GFR Estimate If Black      >60 mL/min/1.7m2 62   Calcium      8.5 - 10.1 mg/dL 9.4   Bilirubin Total      0.2 - 1.3 mg/dL 0.7   Albumin      3.4 - 5.0 g/dL 4.3   Protein Total      6.8 - 8.8 g/dL 7.7  "  Alkaline Phosphatase      40 - 150 U/L 66   ALT      0 - 50 U/L 24   AST      0 - 45 U/L 18   Cholesterol      <200 mg/dL 112   Triglycerides      <150 mg/dL 100   HDL Cholesterol      >49 mg/dL 34 (L)   LDL Cholesterol Calculated      <100 mg/dL 58   Non HDL Cholesterol      <130 mg/dL 78   N-Terminal Pro Bnp      0 - 125 pg/mL 29   Hemoglobin A1C      4.3 - 6.0 % 7.0 (H)   Amylase      30 - 110 U/L 60   Lipase      73 - 393 U/L 394 (H)   Troponin I ES      0.000 - 0.045 ug/L <0.015 . . .     Component      Latest Ref Rng 2/24/2016   Angiotensin Converting Enzyme       <5 (L) . . .   Neutrophil Cytoplasmic IgG Antibody       <1:20 . . .   Sed Rate      0 - 20 mm/h 86 (H)     Copath Report      Patient Name: FAVIO MARTINEZ   MR#: [id number]   Specimen #: [id number]   Collected: 3/15/2016   Received: 3/15/2016   Reported: 3/17/2016 13:33   Ordering Phy(s): PARVEEN ENRIQUEZ     ORIGINAL REPORT FOLLOWS ADDENDUM  ADDENDUM     TO ORIGINAL REPORT   Status: Signed Out   Date Ordered:3/18/2016   Date Complete:3/18/2016   Date Reported:3/18/2016 12:06   Signed Out By: Marianne Godfrey MD     INTERPRETATION:   This addendum is done to incorporate the results of fungal (GMS) stains   done on the specimen.  Specimen is negative for fungal organisms.  The   original final diagnosis remains unchanged.     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  Negative for malignancy(Please   see microscopic description)     Electronically signed out by:     Marianne Godfrey MD     CLINICAL HISTORY:   right orbital mass     GROSS:   The specimen is received labeled \"right orbital biopsy\" and consists of   red-tan nodule measuring 1.5 x 0.9 x 0.6 cm with one smooth side and   opposite rough side consistent with " periosteum.  The specimen is   bisected revealing homogenous pale tan fleshy cut surface.  Touch   preparations are prepared, air dried and fixed, portion of specimen is   submitted in RPMI for possible lymphoma workup [facility],   ([employer], [place], [place] ).  The remainder is entirely submitted.   (Dictated by: Marianne Godfrey MD 3/15/2016 04:45 PM)     MICROSCOPIC:     Specimen consists of portion of lacrimal gland with acute and chronic   inflammation.  Focal area of microabscess formation associated with   small areas of necrosis are also present.  Inflammation is seen   extending to the periorbital adipose tissue forming panniculitis.   Specimen is negative for malignancy.  Samples sent for immunophenotyping   to [facility], ([employer] [facility], [place], [place] ) reveals no evidence   of monoclonality or aberrant antigen expression.  A GMS (fungal) stain   is pending and results will be reported as an addendum.     CPT Codes:   A: 56953-RR9, 72037-BKOJE, SOH, 31859-CTRJU, 39750-OQON     TESTING LAB LOCATION:   [address]  [address] [phone]     COLLECTION SITE:   Client: [facility]   Location: [facility] (S)            Component      Latest Ref Rng 4/29/2016   Nucleated RBCs      0 /100 0   Absolute Neutrophil      1.6 - 8.3 10e9/L 8.9 (H)   Absolute Lymphocytes      0.8 - 5.3 10e9/L 3.2   Absolute Monocytes      0.0 - 1.3 10e9/L 0.8   Absolute Eosinophils      0.0 - 0.7 10e9/L 0.2   Absolute Basophils      0.0 - 0.2 10e9/L 0.1   Abs Immature Granulocytes      0 - 0.4 10e9/L 0.1   Absolute Nucleated RBC       0.0   IGG      695 - 1620 mg/dL 836   IgG1      300 - 856 mg/dL 280 (L)   IgG2      158 - 761 mg/dL 277   IgG3      24 - 192 mg/dL 35   IgG4      11 - 86 mg/dL 16   RNP Antibody IgG      0.0 - 0.9 AI <0.2 . . .   Smith SUNNY Antibody IgG      0.0 - 0.9 AI <0.2 . . .   SSA (Ro) (SUNNY) Antibody, IgG      0.0 - 0.9 AI <0.2 . . .    SSB (La) (SUNNY) Antibody, IgG      0.0 - 0.9 AI <0.2 . . .   Scleroderma Antibody Scl-70 SUNNY IgG      0.0 - 0.9 AI <0.2 . . .   Cholesterol      <200 mg/dL 154   Triglycerides      <150 mg/dL 220 (H)   HDL Cholesterol      >49 mg/dL 42 (L)   LDL Cholesterol Calculated      <100 mg/dL 67   Non HDL Cholesterol      <130 mg/dL 111   M Tuberculosis Result      NEG Negative   M Tuberculosis Antigen Value       0.00   Albumin      3.4 - 5.0 g/dL 4.3   ALT      0 - 50 U/L 30   AST      0 - 45 U/L 10   Complement C3      76 - 169 mg/dL 157   Complement C4      15 - 50 mg/dL 32   CRP Inflammation      0.0 - 8.0 mg/L <2.9   Sed Rate      0 - 20 mm/h 2   DNA-ds      <10 IU/mL 1   Cyclic Citrullinated Peptide Antibody, IgG      <7 U/mL 1   Rheumatoid Factor      <20 IU/mL <20   Proteinase 3 Antibody IgG      0.0 - 0.9 AI <0.2 . . .   Myeloperoxidase Antibody IgG      0.0 - 0.9 AI 2.5 (H)   Vitamin D Deficiency screening      20 - 75 ug/L 24   Hemoglobin A1C      4.3 - 6.0 % 7.9 (H)   ALLI by IFA IgG       1:40 (A) . . .     Component      Latest Ref Rng & Units 4/7/2017   WBC      4.0 - 11.0 10e9/L 11.3 (H)   RBC Count      3.8 - 5.2 10e12/L 4.77   Hemoglobin      11.7 - 15.7 g/dL 12.5   Hematocrit      35.0 - 47.0 % 38.7   MCV      78 - 100 fl 81   MCH      26.5 - 33.0 pg 26.2 (L)   MCHC      31.5 - 36.5 g/dL 32.3   RDW      10.0 - 15.0 % 13.2   Platelet Count      150 - 450 10e9/L 347   Diff Method       Automated Method   % Neutrophils      % 67.1   % Lymphocytes      % 22.9   % Monocytes      % 6.2   % Eosinophils      % 3.0   % Basophils      % 0.4   % Immature Granulocytes      % 0.4   Nucleated RBCs      0 /100 0   Absolute Neutrophil      1.6 - 8.3 10e9/L 7.5   Absolute Lymphocytes      0.8 - 5.3 10e9/L 2.6   Absolute Monocytes      0.0 - 1.3 10e9/L 0.7   Absolute Eosinophils      0.0 - 0.7 10e9/L 0.3   Absolute Basophils      0.0 - 0.2 10e9/L 0.1   Abs Immature Granulocytes      0 - 0.4 10e9/L 0.1   Absolute Nucleated  RBC       0.0   IGG      695 - 1620 mg/dL 962   IgG1      300 - 856 mg/dL Test sent to [facility]. See ARMISC.   IgG2      158 - 761 mg/dL Test sent to [facility]. See ARMISC.   IgG3      24 - 192 mg/dL Test sent to [facility]. See ARMISC.   IgG4      11 - 86 mg/dL Test sent to [facility]. See ARMISC.   Result       SEE NOTE . . .   Test Name       IGG SUBCLASSES   Send Outs Misc Test Code       70275   Send Outs Misc Test Specimen       Serum   Creatinine      0.52 - 1.04 mg/dL 1.20 (H)   GFR Estimate      >60 mL/min/1.7m2 47 (L)   GFR Estimate If Black      >60 mL/min/1.7m2 57 (L)   Creatinine Urine      mg/dL    Albumin Urine mg/L      mg/L    Albumin Urine mg/g Cr      0 - 25 mg/g Cr    Myeloperoxidase Antibody IgG      0.0 - 0.9 AI 2.9 (H)   Proteinase 3 Antibody IgG      0.0 - 0.9 AI <0.2 . . .   Neutrophil Cytoplasmic IgG Antibody       1:80 (A) . . .   Angiotensin Converting Enzyme       <5 (L) . . .   Lab Scanned Result       TPMT GENOTYPE-Scanned   Vitamin C       56   ALT      0 - 50 U/L 23   Albumin      3.4 - 5.0 g/dL 4.3   AST      0 - 45 U/L 18   CRP Inflammation      0.0 - 8.0 mg/L 3.7   Sed Rate      0 - 30 mm/h 17   Vitamin D Deficiency screening      20 - 75 ug/L 40   Hemoglobin A1C      4.3 - 6.0 %      Component      Latest Ref Rng & Units 4/26/2017   WBC      4.0 - 11.0 10e9/L 11.8 (H)   RBC Count      3.8 - 5.2 10e12/L 4.23   Hemoglobin      11.7 - 15.7 g/dL 11.2 (L)   Hematocrit      35.0 - 47.0 % 35.0   MCV      78 - 100 fl 83   MCH      26.5 - 33.0 pg 26.5   MCHC      31.5 - 36.5 g/dL 32.0   RDW      10.0 - 15.0 % 13.4   Platelet Count      150 - 450 10e9/L 304   Diff Method       Automated Method   % Neutrophils      % 66.2   % Lymphocytes      % 22.7   % Monocytes      % 6.5   % Eosinophils      % 3.9   % Basophils      % 0.4   % Immature Granulocytes      % 0.3   Nucleated RBCs      0 /100 0   Absolute Neutrophil      1.6 - 8.3 10e9/L 7.8   Absolute Lymphocytes      0.8 - 5.3 10e9/L 2.7   Absolute Monocytes       0.0 - 1.3 10e9/L 0.8   Absolute Eosinophils      0.0 - 0.7 10e9/L 0.5   Absolute Basophils      0.0 - 0.2 10e9/L 0.1   Abs Immature Granulocytes      0 - 0.4 10e9/L 0.0   Absolute Nucleated RBC       0.0   IGG      695 - 1620 mg/dL    IgG1      300 - 856 mg/dL    IgG2      158 - 761 mg/dL    IgG3      24 - 192 mg/dL    IgG4      11 - 86 mg/dL    Result          Test Name          Send Outs Misc Test Code          Send Outs Misc Test Specimen          Creatinine      0.52 - 1.04 mg/dL 1.24 (H)   GFR Estimate      >60 mL/min/1.7m2 46 (L)   GFR Estimate If Black      >60 mL/min/1.7m2 55 (L)   Creatinine Urine      mg/dL 121   Albumin Urine mg/L      mg/L <5   Albumin Urine mg/g Cr      0 - 25 mg/g Cr Unable to calculate due to low value   Myeloperoxidase Antibody IgG      0.0 - 0.9 AI    Proteinase 3 Antibody IgG      0.0 - 0.9 AI    Neutrophil Cytoplasmic IgG Antibody          Angiotensin Converting Enzyme          Lab Scanned Result          Vitamin C          ALT      0 - 50 U/L 25   Albumin      3.4 - 5.0 g/dL 4.1   AST      0 - 45 U/L 15   CRP Inflammation      0.0 - 8.0 mg/L    Sed Rate      0 - 30 mm/h    Vitamin D Deficiency screening      20 - 75 ug/L    Hemoglobin A1C      4.3 - 6.0 % 7.8 (H)   EXAMINATION: CT CHEST ABDOMEN PELVIS W/O CONTRAST, 4/7/2017 2:59 PM     TECHNIQUE: Helical CT images from the thoracic inlet through the  symphysis pubis were obtained without IV contrast.     COMPARISON: CT chest on 5/20/2015. CT abdomen pelvis on 6/11/2014     HISTORY: Granuloma of right orbit. Concern for malignancy, lymphoma.     Additional history from the EMR: 50-year-old female with rheumatoid  arthritis and fibromyalgia. Presented on April 2016 with new right  orbital inflammatory mass, biopsied showed panniculitis without  infection or malignancy. Some intermittent swelling around her right  orbit.     FINDINGS:     Chest: Cardiac size is not enlarged. No pericardial effusion.  Calcified subcentimeter  mediastinal and left hilar lymph nodes. No  thoracic adenopathy. Coronary artery calcifications/stents.  Benign-appearing coarse calcifications in the thyroid, better  described on ultrasound thyroid from 9/13/2016.     Central airways are patent. No pneumothorax or pleural effusion.  Calcified pulmonary granuloma in the posterior left upper lobe,  benign. Small focus of subpleural fibrosis and cysts along the  anterior lateral right lower lobe (image 96 series 6).     Abdomen and pelvis: Cholecystectomy. The liver, adrenal glands,  kidneys, spleen, pancreas, stomach, duodenum are without focal  abnormality on these noncontrast images.     No abdominal aortic aneurysm. No suspicious adenopathy in the abdomen  or pelvis. Bladder is intact. Calcified fibroid off the uterine  fundus. IUD in the uterus.     No focal bowel wall thickening or obstruction. No free air or free  fluid. Appendix is normal. Small periumbilical hernia.     Bones and soft tissues: No suspicious osseous lesions. Unremarkable  superficial soft tissues.         IMPRESSION: No evidence of malignancy in the chest, abdomen, or  pelvis.        I have personally reviewed the examination and initial interpretation  and I agree with the findings.     CONNIE BRICE      Component      Latest Ref Rng & Units 6/1/2017   WBC      4.0 - 11.0 10e9/L 12.0 (H)   RBC Count      3.8 - 5.2 10e12/L 5.18   Hemoglobin      11.7 - 15.7 g/dL 13.8   Hematocrit      35.0 - 47.0 % 41.0   MCV      78 - 100 fl 79   MCH      26.5 - 33.0 pg 26.6   MCHC      31.5 - 36.5 g/dL 33.7   RDW      10.0 - 15.0 % 14.8   Platelet Count      150 - 450 10e9/L 322   Diff Method       Automated Method   % Neutrophils      % 76.7   % Lymphocytes      % 16.5   % Monocytes      % 4.6   % Eosinophils      % 1.9   % Basophils      % 0.3   Absolute Neutrophil      1.6 - 8.3 10e9/L 9.2 (H)   Absolute Lymphocytes      0.8 - 5.3 10e9/L 2.0   Absolute Monocytes      0.0 - 1.3 10e9/L 0.6   Absolute  Eosinophils      0.0 - 0.7 10e9/L 0.2   Absolute Basophils      0.0 - 0.2 10e9/L 0.0   Color Urine       Yellow   Appearance Urine       Clear   Glucose Urine      NEG mg/dL Negative   Bilirubin Urine      NEG Negative   Ketones Urine      NEG mg/dL Negative   Specific Gravity Urine      1.003 - 1.035 1.010   pH Urine      5.0 - 7.0 pH 5.5   Protein Albumin Urine      NEG mg/dL Negative   Urobilinogen Urine      0.2 - 1.0 EU/dL 0.2   Nitrite Urine      NEG Negative   Blood Urine      NEG Negative   Leukocyte Esterase Urine      NEG Negative   Source       Midstream Urine   WBC Urine      0 - 2 /HPF O - 2   RBC Urine      0 - 2 /HPF O - 2   Squamous Epithelial /LPF Urine      FEW /LPF Few   Bacteria Urine      NEG /HPF Few (A)   Creatinine      0.52 - 1.04 mg/dL 1.19 (H)   GFR Estimate      >60 mL/min/1.7m2 48 (L)   GFR Estimate If Black      >60 mL/min/1.7m2 58 (L)   Protein Random Urine      g/L <0.05   Protein Total Urine g/gr Creatinine      0 - 0.2 g/g Cr Unable to calculate due to low value   Myeloperoxidase Antibody IgG      0.0 - 0.9 AI 1.9 (H)   Proteinase 3 Antibody IgG      0.0 - 0.9 AI <0.2 . . .   ALT      0 - 50 U/L 29   AST      0 - 45 U/L 18   Albumin      3.4 - 5.0 g/dL 4.6   CRP Inflammation      0.0 - 8.0 mg/L 6.1   Sed Rate      0 - 30 mm/h 13   Creatinine Urine Random      mg/dL 54   Neutrophil Cytoplasmic IgG Antibody       <1:20 . . .   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      Comparison: MRI brain 5/29/2013      Technique:   Orbits: Axial and coronal T1-weighted, and coronal T2-weighted images  obtained without intravenous contrast. Post-intravenous contrast  (using gadolinium) sagittal FLAIR, were obtained with  fat-saturation,  focused on the orbits.  Brain: Axial susceptibility-weighted and FLAIR sequences were obtained  of the whole brain without intravenous contrast, and postcontrast  axial T1-weighted images were obtained through the whole brain.   Contrast: 7.5mL Gadavist     Findings:  New asymmetric enlargement of the right lacrimal gland and enhancement  of the right lacrimal gland with surrounding ill-defined crescentic T2  hyperintense signal and enhancement within the right superior  superotemporal orbit, predominantly involving the extraconal space  with slight intraconal extension. No definite associated restricted  diffusion. No discrete extraocular muscle enlargement. Normal  symmetric optic nerve signal. Cavernous sinuses and orbital apices are  normal. Globes are normal.     Whole brain images are symmetric minimal leukoaraiosis and generalized  parenchymal volume loss. Ventricles are not enlarged. No intracranial  hemorrhage, mass effect, midline shift or abnormal extra axial fluid  collection. No abnormal foci of intracranial enhancement or restricted  diffusion. Paranasal sinuses and mastoid air cells are clear.            Impression:    New abnormal enlargement of the right lacrimal gland with surrounding  ill-defined T2 hyperintense signal and enhancement centered in the  superotemporal right orbital fat, which may represent   infectious/inflammatory dacryadenitis, orbital pseudotumor, lymphoma,  carcinoma, sarcoidosis or IgG4 disease..     I have personally reviewed the examination and initial interpretation  and I agree with the findings.     PATRICIA BRANTLEY MD      Component      Latest Ref Rng & Units 6/1/2017   WBC      4.0 - 11.0 10e9/L 12.0 (H)   RBC Count      3.8 - 5.2 10e12/L 5.18   Hemoglobin      11.7 - 15.7 g/dL 13.8   Hematocrit      35.0 - 47.0 % 41.0   MCV      78 - 100 fl 79   MCH      26.5 - 33.0 pg 26.6   MCHC      31.5 - 36.5 g/dL 33.7   RDW      10.0 - 15.0 % 14.8   Platelet  Count      150 - 450 10e9/L 322   Diff Method       Automated Method   % Neutrophils      % 76.7   % Lymphocytes      % 16.5   % Monocytes      % 4.6   % Eosinophils      % 1.9   % Basophils      % 0.3   Absolute Neutrophil      1.6 - 8.3 10e9/L 9.2 (H)   Absolute Lymphocytes      0.8 - 5.3 10e9/L 2.0   Absolute Monocytes      0.0 - 1.3 10e9/L 0.6   Absolute Eosinophils      0.0 - 0.7 10e9/L 0.2   Absolute Basophils      0.0 - 0.2 10e9/L 0.0   Color Urine       Yellow   Appearance Urine       Clear   Glucose Urine      NEG mg/dL Negative   Bilirubin Urine      NEG Negative   Ketones Urine      NEG mg/dL Negative   Specific Gravity Urine      1.003 - 1.035 1.010   pH Urine      5.0 - 7.0 pH 5.5   Protein Albumin Urine      NEG mg/dL Negative   Urobilinogen Urine      0.2 - 1.0 EU/dL 0.2   Nitrite Urine      NEG Negative   Blood Urine      NEG Negative   Leukocyte Esterase Urine      NEG Negative   Source       Midstream Urine   WBC Urine      0 - 2 /HPF O - 2   RBC Urine      0 - 2 /HPF O - 2   Squamous Epithelial /LPF Urine      FEW /LPF Few   Bacteria Urine      NEG /HPF Few (A)   Creatinine      0.52 - 1.04 mg/dL 1.19 (H)   GFR Estimate      >60 mL/min/1.7m2 48 (L)   GFR Estimate If Black      >60 mL/min/1.7m2 58 (L)   Protein Random Urine      g/L <0.05   Protein Total Urine g/gr Creatinine      0 - 0.2 g/g Cr Unable to calculate due to low value   Myeloperoxidase Antibody IgG      0.0 - 0.9 AI 1.9 (H)   Proteinase 3 Antibody IgG      0.0 - 0.9 AI <0.2 . . .   ALT      0 - 50 U/L 29   AST      0 - 45 U/L 18   Albumin      3.4 - 5.0 g/dL 4.6   CRP Inflammation      0.0 - 8.0 mg/L 6.1   Sed Rate      0 - 30 mm/h 13   Creatinine Urine Random      mg/dL 54   Neutrophil Cytoplasmic IgG Antibody       <1:20 . . .   Creatinine Urine      mg/dL 54       Patient Name: FAVIO MARTINEZ   MR#: [id number]   Specimen #: [id number]   Collected: 8/4/2017   Received: 8/4/2017   Reported: 8/9/2017 16:19   Ordering Phy(s): ALI  "SANDRA     For improved result formatting, select 'View Enhanced Report Format'   under Linked Documents section.     SPECIMEN(S):   Eye, right lacrimal gland     FINAL DIAGNOSIS:   Eye, right, \"lacrimal gland\", biopsy   - Dense fibrosis and patchy inflammation   - No residual lacrimal gland seen     COMMENT:   Sections show tissue involved by dense fibrosis and patchy inflammation.   There is no morphologic or immunohistochemical evidence of lymphoma. By   separate report, concurrent flow cytometry studies ([id number]),   performed at the [facility], show polytypic B cells and no   aberrant immunophenotype on T cells. Immunohistochemical stains for IgG   and IgG-4 were performed, which provide no support for IgG-4-related   disease. Some of these changes may be related to prior surgery. Sjögren   disease is not excluded. There is no evidence of malignancy. Dr. Max Conway has reviewed this case and concurs.     Electronically signed out by:     Antonella Beth M.D.   INDICATION:  Right eye edema. Reported history of right orbital biopsy yielding pathology consistent with idiopathic inflammation.    TECHNIQUE:  Noncontrast CT images were obtained through the brain and facial bones.    COMPARISON:  CT sinus 03/27/2017, MRI brain and orbits 02/15/2016.     FINDINGS:  CT facial bones:   Large soft tissue lesion centered within the lateral intraconal and extraconal right orbit has significantly increased in size compared to the CT dated 03/27/2017. The lesion is inseparable from the right superior, lateral, and inferior rectus muscles, as well as the right lacrimal gland. The lesion demonstrates extension posteriorly slightly proximal to the orbital apex, as well as extension into the medial orbit superiorly and anteriorly. Mass effect includes medial bowing of the optic nerve sheath complex and pronounced proptosis of the right globe.  No mass is identified in the right orbit.  Torus palatinus. "   Minimal mucosal thickening in the ethmoid air cells. The remainder of the visualized paranasal sinuses are clear.  CT brain:  The ventricles and sulci within normal limits for patient age. No mass effect or midline shift. The gray-white differentiation is maintained.  No acute intracranial hemorrhage or pathologic extra-axial fluid collection.  The calvarium is intact. The mastoid air cells are clear.    IMPRESSION:  1. Large soft tissue lesion centered within the lateral intraconal and extraconal right orbit has significantly increased in size compared to the CT dated 03/27/2017. The lesion is inseparable from the right lacrimal gland and rectus musculature. Mass effect includes medial bowing of the optic nerve sheath complex and pronounced proptosis of the right globe. Given provided history, findings may represent a progressive inflammatory etiology (pseudotumor). Other differential considerations, such as sarcoidosis or lymphoma, could also have this appearance.  2. No acute intracranial hemorrhage or intracranial mass effect.    Please note that all CT scans at this facility use dose modulation, iterative reconstruction, and/or weight-based dosing when appropriate to reduce radiation dose to as low as reasonably achievable.    Dictated by Charles Ward MD @ Jul 16 2018  3:54PM    Signed by Dr. Charles Ward @ Jul 16 2018  4:20PM    ROS:  A comprehensive ROS was done, positives are per HPI.        HISTORY REVIEW:  Past Medical History:   Diagnosis Date     Abnormal glandular Papanicolaou smear of cervix 1992     Allergic rhinitis     Allergy, airborne subst     Arthritis      ASCVD (arteriosclerotic cardiovascular disease)      Chronic pain      Chronic pancreatitis (H)     idiopathic, Tx: PPI, antioxidants, pancreatic enzymes     Common migraine      Coronary artery disease      Costochondritis      Difficulty in walking(719.7)      Dyspnea on exertion      Ectasia, mammary duct     [facility], persistent nipple discharge     Elevated fasting glucose      Gastro-oesophageal reflux disease      Hernia      History of angina      Hyperlipidemia LDL goal < 100      Hypertension goal BP (blood pressure) < 140/90     Essential hypertension     Iron deficiency anemia      Ischemic cardiomyopathy      Menorrhagia      Migraine headaches      Mild persistent asthma      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 Matias, neg w/u, Rx'd w/prednisone     Ventral hernia, unspecified, without mention of obstruction or gangrene 2012     Past Surgical History:   Procedure Laterality Date     C ECHO HEART XTHORACIC,COMPLETE, W/O DOPPLER  04    [facility] WNL, EF 60%     C/SECTION, LOW TRANSVERSE           CARDIAC SURGERY      cardiac stent- recent in 16; 4 other stents     DILATION AND CURETTAGE N/A 2016    Procedure: DILATION AND CURETTAGE;  Surgeon: Nahed Butler MD;  Location: [facility]      UGI ENDOSCOPY W EUS  08    Dr. Pastrana, possible chronic pancreatitis, fatty liver     [place] [facility] [place] INTRAUTERINE DEVICE N/A 2016    Procedure: INSERT INTRAUTERINE DEVICE;  Surgeon: Nahed Butler MD;  Location: [facility]     INT UTERINE FIBRIOD EMBOLIZATION  10/29/2014     LAPAROSCOPIC CHOLECYSTECTOMY   08    Dr. Arnol GRUBBS TX, CERVICAL      s/p LEEP     ORBITOTOMY Right 3/15/2016    Procedure: ORBITOTOMY;  Surgeon: Myron Cyr MD;  Location: [facility]     ORBITOTOMY Right 2017    Procedure: ORBITOTOMY;  RIGHT ORBITOTOMY AND BIOPSY;  Surgeon: Charis Holbrook MD;  Location: [facility]     REPAIR PTOSIS Right 2017    Procedure: REPAIR PTOSIS;  RIGHT UPPER LID PTOSIS REPAIR;  Surgeon: Myron Cyr MD;  Location: [facility]     UPPER GI ENDOSCOPY  10/21/08    mild gastritis, Dr. Hidalgo     Family History   Problem Relation Age of Onset     Heart Disease Father 50        heart attack     Cerebrovascular Disease Father      Diabetes Father      Hypertension Father      Depression Father      C.A.D. Father      Hypertension Mother      Arthritis Mother      Heart Disease Mother         long qt syndrome     Thyroid Disease Mother      C.A.D. Mother      Heart Disease Brother 15        MI at 15, 16.      Diabetes Maternal Uncle      Hypertension Maternal Aunt      Hypertension Maternal Uncle      Arthritis Brother         he passed away, had severe arthritis at age 11     Arthritis Maternal Uncle      Eye Disorder Maternal Uncle         cataracts     Neurologic Disorder Sister         migraines     Neurologic Disorder Sister         migraines     Respiratory Son         asthma     Cerebrovascular Disease Maternal Uncle      C.A.D. Brother      Family History Negative Other         neg for RA, SLE     Unknown/Adopted No family hx of         unknown neurological issues in her family, mother was adopted     Skin Cancer No family hx of         No known family hx of skin cancer     Social History     Socioeconomic History     Marital status: Single     Spouse name: Not on file     Number of children: 1     Years of education: Not on file     Highest education level: Not on file   Occupational History     Employer: NONE    Social Needs     Financial resource strain: Not on file     Food  insecurity:     Worry: Not on file     Inability: Not on file     Transportation needs:     Medical: Not on file     Non-medical: Not on file   Tobacco Use     Smoking status: Former Smoker     Packs/day: 0.20     Years: 1.00     Pack years: 0.20     Types: Cigarettes     Last attempt to quit: 2/1/2016     Years since quitting: 3.0     Smokeless tobacco: Never Used   Substance and Sexual Activity     Alcohol use: No     Alcohol/week: 0.0 oz     Drug use: No     Sexual activity: Not Currently   Lifestyle     Physical activity:     Days per week: Not on file     Minutes per session: Not on file     Stress: Not on file   Relationships     Social connections:     Talks on phone: Not on file     Gets together: Not on file     Attends Confucianism service: Not on file     Active member of club or organization: Not on file     Attends meetings of clubs or organizations: Not on file     Relationship status: Not on file     Intimate partner violence:     Fear of current or ex partner: Not on file     Emotionally abused: Not on file     Physically abused: Not on file     Forced sexual activity: Not on file   Other Topics Concern     Parent/sibling w/ CABG, MI or angioplasty before 65F 55M? Yes   Social History Narrative    Balanced Diet - sometimes    Osteoporosis Prevention Measures - Dairy servings per day: 2 servings weekly    Regular Exercise -  Yes Describe walking 4 times a week    Dental Exam - NO    Seatbelts used - Yes    Self Breast Exam - Yes    Abuse: Current or Past (Physical, Sexual or Emotional)- No    Do you have any concerns about STD's -  No    Do you feel safe in your environment - No    Guns stored in the home - No    Sunscreen used - Yes    Lipids -  YES - Date: 053102    Glucose -  YES - Date: 012804    Eye Exam - YES - Date: one year ago    Colon Cancer Screening - No    Hemoccults - NO    Pap Test -  YES - Date: 070904, remote history of LEEP    Mammography - YES - Date: last spring, would like to  discuss, needs a referral to [facility]    DEXA - NO    Immunizations reviewed and up to date - Yes, last td given in  or      Patient Active Problem List   Diagnosis     Seasonal affective disorder (H)     Allergic rhinitis     PCOS (polycystic ovarian syndrome)     Moderate persistent asthma     Chronic pancreatitis (H)     Hypertension goal BP (blood pressure) < 140/90     Common migraine     NSTEMI (non-ST elevated myocardial infarction) (H)     Hyperlipidemia LDL goal <70     ASCVD (arteriosclerotic cardiovascular disease)     GERD (gastroesophageal reflux disease)     Ischemic cardiomyopathy     Hypertensive heart disease     Uterine leiomyoma     Iron deficiency anemia     Costochondritis     Vitamin D deficiency     Breast cancer screening     Spinal stenosis in cervical region     Fibromyalgia     Seronegative rheumatoid arthritis (H)     Type 2 diabetes, HbA1C goal < 8% (H)     Type 2 diabetes mellitus with other specified complication (H)     Hyperlipidemia associated with type 2 diabetes mellitus (H)     Hypertension associated with diabetes (H)     Overweight with body mass index (BMI) 25.0-29.9     Tobacco use disorder     Telogen effluvium     CAD S/P percutaneous coronary angioplasty     Status post coronary angiogram     ANCA-associated vasculitis (H)     Wegener's vasculitis (H)       Pregnancy Hx: She is . All misscarriages were in first trimester. H/o OC use in the past. 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    PMHx, FHx, SHx were reviewed, unchanged.      Outpatient Encounter Medications as of 3/6/2019   Medication Sig Dispense Refill     acetaminophen (TYLENOL) 325 MG tablet Take 1-2 tablets (325-650 mg) by mouth every 6 hours as needed for pain (headache) 250 tablet 0     albuterol (2.5 MG/3ML) 0.083% neb solution INL 1 VIAL VIA NEBULIZATION Q 4 TO 6 HOURS PRN  1     albuterol (PROAIR HFA, PROVENTIL HFA, VENTOLIN HFA) 108 (90 BASE) MCG/ACT  inhaler Inhale 2 puffs into the lungs every 6 hours as needed for shortness of breath / dyspnea or wheezing 3 Inhaler 1     ASPIRIN LOW DOSE 81 MG EC tablet Take 1 tablet (81 mg) by mouth daily 90 tablet 3     BASAGLAR 100 UNIT/ML injection Inject 40 Units Subcutaneous daily 12 mL 2     blood glucose monitoring (NO BRAND SPECIFIED) meter device kit Use to test blood sugar 4 X times daily or as directed. 1 kit 0     blood glucose monitoring (NO BRAND SPECIFIED) test strip 1 strip by In Vitro route 4 times daily Test as directed. Please dispense three months and three months of refills. Thank you. 360 each 3     blood glucose monitoring (ONE TOUCH DELICA) lancets Use to test blood sugars 4 times daily or as directed. 4 Box 3     calcium carbonate (TUMS) 500 MG chewable tablet Take 3-4 chew tab by mouth daily as needed.       clopidogrel (PLAVIX) 75 MG tablet Take 1 tablet (75 mg) by mouth daily 90 tablet 2     COMPRESSION STOCKINGS 2 each daily Apply one 10-15 mmHg compression stocking to each lower extgmierty during the day and remove before bedtime. 4 each 2     cyclobenzaprine (FLEXERIL) 10 MG tablet Take 1 tablet (10 mg) by mouth 2 times daily as needed for muscle spasms 180 tablet 0     cycloSPORINE (RESTASIS) 0.05 % ophthalmic emulsion Place 1 drop into the right eye every 12 hours       desonide (DESOWEN) 0.05 % cream Apply topically 2 times daily       dicyclomine (BENTYL) 20 MG tablet TAKE 1 TABLET(20 MG) BY MOUTH FOUR TIMES DAILY AS NEEDED 60 tablet 3     diphenhydrAMINE (BENADRYL) 25 MG capsule TK 1 TO 2 CS PO QHS  4     doxycycline hyclate (VIBRA-TABS) 100 MG tablet        EPIPEN 2-RIKY 0.3 MG/0.3ML injection INJECT 0.3 MG INTO THE MUSCLE PRF ANAPHYALAXIS  0     ferrous gluconate (FERGON) 324 (38 Fe) MG tablet Take 1 tablet (324 mg) by mouth 2 times daily (with meals) 180 tablet 3     fexofenadine (ALLEGRA) 180 MG tablet Take 1 tablet by mouth daily as needed. 90 tablet 3     fluocinolone (SYNALAR) 0.01 %  solution Apply topically daily as needed       fluocinonide (LIDEX) 0.05 % external solution Apply topically 2 times daily To areas of itch on the scalp as needed. 60 mL 11     fluticasone-vilanterol (BREO ELLIPTA) 100-25 MCG/INH inhaler Inhale 1 puff into the lungs daily       folic acid (FOLVITE) 1 MG tablet Take 1 tablet by mouth daily 90 tablet 3     hydroxychloroquine (PLAQUENIL) 200 MG tablet Take 2 tablets (400 mg) by mouth daily 180 tablet 1     insulin pen needle (BD MARCK U/F) 32G X 4 MM USE DAILY OR AS DIRECTED 300 each 3     ketoconazole (NIZORAL) 2 % cream Apply topically as needed   3     ketoconazole (NIZORAL) 2 % shampoo Apply topically daily as needed       lifitegrast (XIIDRA) 5 % opthalmic solution Place 1 drop into both eyes 2 times daily 20 each 3     lisinopril-hydrochlorothiazide (PRINZIDE/ZESTORETIC) 20-25 MG tablet Take 1 tablet by mouth daily 90 tablet 2     Magnesium Oxide -Mg Supplement 250 MG TABS TK 1 T PO BID. REDUCE IF STOOLS LOOSEN  11     metFORMIN (GLUCOPHAGE-XR) 500 MG 24 hr tablet TAKE 2 TABLETS(1000 MG) BY MOUTH DAILY WITH DINNER 180 tablet 0     metoclopramide (REGLAN) 10 MG tablet Take 1 tablet (10 mg) by mouth 4 times daily (before meals and nightly) 90 tablet 0     metoprolol succinate ER (TOPROL-XL) 100 MG 24 hr tablet Take 1 tablet (100 mg) by mouth daily 90 tablet 2     metroNIDAZOLE (NORITATE) 1 % cream Apply topically daily       montelukast (SINGULAIR) 10 MG tablet Take 1 tablet (10 mg) by mouth At Bedtime 30 tablet 1     Multiple Vitamin (DAILY-GERALD) TABS Take 1 tablet by mouth daily  0     nitroGLYcerin (NITROSTAT) 0.4 MG sublingual tablet Place 1 tablet (0.4 mg) under the tongue every 5 minutes as needed for chest pain 25 tablet 1     nystatin (MYCOSTATIN) 317577 UNITS TABS tablet TK 2 TS PO BID  0     olopatadine (PATANOL) 0.1 % ophthalmic solution Place 1 drop into both eyes 2 times daily as needed for allergies. 5 mL 12     ondansetron (ZOFRAN-ODT) 8 MG ODT tab  Take 1 tablet (8 mg) by mouth every 8 hours as needed for nausea 60 tablet 1     Ondansetron HCl (ZOFRAN PO)        pantoprazole (PROTONIX) 40 MG EC tablet Take 1 tablet (40 mg) by mouth daily Pork free tablets. 30 tablet 1     pravastatin (PRAVACHOL) 40 MG tablet Take 1 tablet (40 mg) by mouth daily 90 tablet 2     ranitidine (ZANTAC) 150 MG tablet Take 1 tablet (150 mg) by mouth 2 times daily 180 tablet 1     spironolactone (ALDACTONE) 50 MG tablet Take 1 tablet (50 mg) by mouth daily . Take additional 0.5 tablets by mouth once daily as needed for weight gain. 90 tablet 2     sucralfate (CARAFATE) 1 GM tablet Take 1 tablet (1 g) by mouth 4 times daily 120 tablet 3     traMADol (ULTRAM) 50 MG tablet Take 1 tablet (50 mg) by mouth every 8 hours as needed for moderate pain 60 tablet 3     Triamcinolone Acetonide (NASACORT ALLERGY 24HR NA)        vitamin D (ERGOCALCIFEROL) 19839 UNIT capsule Take 1 capsule (50,000 Units) by mouth every 7 days Need a Vitamin D level in 2 months. (Patient taking differently: Take 50,000 Units by mouth every 7 days Need a Vitamin D level in 2 months.) 8 capsule 0     [] lidocaine (viscous) 15 mL, alum & mag hydroxide-simethicone 15 mL GI Cocktail Take 30 mLs by mouth once for 1 dose 30 mL 0     lidocaine (viscous), alum & mag hydroxide-simethicone GI Cocktail 30 ml times on po now (Patient not taking: Reported on 2018) 30 mL 0     lidocaine (XYLOCAINE) 5 % ointment Apply 1 g topically 2 times daily as needed for moderate pain (Patient not taking: Reported on 2018) 50 g 5     [] order for DME 1 Device once for 1 dose Equipment being ordered: Wedge pillow 1 each 0     No facility-administered encounter medications on file as of 3/6/2019.        Allergies   Allergen Reactions     Amoxicillin-Pot Clavulanate      Augmentin      Unknown possible hives and edema     Azithromycin      Diatrizoate Other (See Comments)     Pt wants this listed because she is allergic to  "shellfish      Imitrex [Sumatriptan]      Severe face/neck/chest tightness and flushing side effects      Penicillins Hives     Unknown      Pork Allergy      Stomach pain, cramping, diarrhea, itching, nausea and headaches     Shellfish Allergy Hives and Swelling     Sulfa Drugs Hives and Swelling     Zithromax [Azithromycin Dihydrate] Swelling     Unknown          Ph.E:    Vitals:    03/06/19 1205   BP: 134/81   Pulse: 93   Temp: 98.5  F (36.9  C)   TempSrc: Oral   SpO2: 100%   Weight: 77.6 kg (171 lb)   Height: 1.6 m (5' 3\")           Constitutional: WD/WN. Pleasant. In no acute distress.   Eyes: Swelling around R eye significantly improved since last visit. EOMI  HEENT: No oral ulcers or thrush. Normal salivary pool.  Neck: No cervical LAP, + thyromegaly  Chest: Clear to auscultation bilaterally  CV: RRR, no murmurs/ rubs or gallops. No edema, clubbing or cyanosis.   GI: Abdomen is soft and non tender.  MS: No synovitis or joint tenderness. Cool joints. No joint deformities. Full ROM of the joints. No nodules. +Fibromyalgia trigger points.  Skin: No livedo, periungual erythema, digital ulcers or nail changes. + alopecia with scales, facial malar erythema (looks like seborrhoic dermatitis).  Neuro: A&O x 3. Grossly non focal, muscular power 5/5 in all ext  Psych: NL affect and mood    Assessment/ plan:    1-Seropositive non-erosive RA (arthritis, AM stiffness, high CRP, RF 26 but re-check neg 3/2015 on HCQ) Dx 5/2013, FMS, new pleuritic CP 3/20-15-5/2015 resolved on steroids. GPA. She is on  mg bid since 5/2014. She tolerates it well and it's helping some but not enough to control all her pain. Continues having flare ups of joint pain, swelling also has FMS. Joint sx are getting worse. Had high ESR/CRP in 3/2015 and new pleuritic CP between 3/2015-5/2015 which resolved after taking medrol dose pack prescribed 5/20/2015. Her pleuritic CP could be related to her RA. Then stopped tramadol as it blames it for " recent episodes of CP.    In the past, MTX was recommended, she declined.    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 Etiology of mass unclear, but it's inflammatory and related to pt's underlying autoimmune disease (inflammatory arthritis, serositis). 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    Her work up showed borderline + ALLI and slightly elevated MPO. I told her prednisone is not good for her diabetes and weight gain. Recommended a trial of MTX as next step. Discussed risks on details. I called her neuro-ophthalmologist at last visit who agreed with trial of MTX (she suspects pseudo-sarcoidosis or sarcoid like disease but no granuloma and ACE not elevated).     Unfortunately despite all my efforts to explain risks vs benefits (more than risks) of MTX as steroid sparing gent, Janine declined to try MTX. I was very concerned about her R orbital inflammatory mass as there is high chance of flare up off steroids and steroid causes her weigh gain and uncontrolled diabetes. I even offered her other steroid sparing gents like imuran. She declined that as well.    Her inflammation around R orbit has recurred now. On 4/7/2017, I spent quite amount of time discussing a trial of MTX. After discussing risks/benefits, she agreed to try it.     She is s/p Tx with MTX 10 mg po qwk 4/2017-5/2017. No benefits and more GI SEs, more hair loss and headaches since start of MTX. No benefits. I called and spoke with her neuro-ophthalmologist who recommended a second opinion from neuro-ophthalmology at the . I suspect her presentation to be ANCA vasculitis related but wanted to repeat imaging and get neuro-ophthalmology eval here. She was recommended to have repeat biopsy to r/o lymphoma.    In 5/2017,  prescribed her prednisone 40-30-20-10 mg a day each for 3 days then stop (she declined higher dose and longer taper despite my concern for worsening swelling around orbit), Her R campos-orbital edema significantly improved. MTX was stopped in 5/2017 given side effects. Plan was to start imuran 50 mg po qd (NL TPMT); however we decided to hold off till she gets biopsy done.    Repeat R lacrimal gland biopsy in 8/2017 showed non specific inflammation, it ruled out lymphoma. Her R orbital swelling is improved but still present. After her biopsy I contacted her to schedule a f/u visit with me to discuss plan of care but she declined till today's visit.    She did not tolerate AZA because of GI SEs.    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     This is definitely an inflammatory process and is steroid responsive, she had local kenalog inj in 8/2017 at the time of biopsy which has helped. Given her diabetes and long term SE of prednisone, highly recommend steroid sparing agent to prevent progression/recurrence of R campos-orbital swelling. Since she did not tolerate MTX and AZA, recommend rituximab as next step.     In 2/2018, I spent 30 minutes discussing test results, plan of care, rituximab SEs including rare risk of PML. She declined rituximab with concern for SEs.    Unfortunately lost f/u sine 2/2018. In 9/2018, was back with her R eye being much worse, swelling around R orbit was severe and is pushing down her eye. She decided to see an ophthalmologist at [place], was seen 8/29, was told to have GPA.    Janine is frustrated with her eye condition, saying nobody told her that she has GPA or Wegener's which is a serious condition and people could die from it.    I reminded her that I discussed  the Dx of ANCA-vasculitis which is just another name for Wegener's with her many times but she always declined induction Tx rituximab at last visit in 9/2018. I put her on prednisone 30 mg every day in 9/2018, now she is off, stopped 6 wk after surgery. Does not like SEs including worsening BG.    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 Shah and Dr. Valdez at [place]. 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 Not happy with results, on my exam, her eye swelling/pain significantly improved. She wants to transfer care to here, I advised her to make an appointment with Dr. Saulo GREEN for f/u and referred her to Dr. Vasquez to assess if she has sinus involvement by GPA given facial pain.    After my original recommendation to receive rituximab (FDA approved for GPA) in 2/2018. She finally agreed to it, had 1 gr q2wk x 2 on 12/12/2018 and 12/26/2018. Had minor allergic reaction which was managed by extra dose of steroid and benadryl. She refuses to do prednisone taper given h/o diabetes, GIB on prednisone. I told her it would take 1 mo for rituximab to show benefit, if she continues to have active disease, recommend trial of cytoxan. Will check labs next week.      CT chest/abdomen/pelvis 4/2017 was neg for malignancy. 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 Repeat MPO was positive in 6/2017.    Continue accupuncture (referral was placed as it helps with chronic pain).     My impression is that her arthralgias are a combination of IA, fibromyalgia and chronic pain.  Stopped HCQ at last visit, shortly after resumed taking it as arthralgia recurred. Continue HCQ. Eye exam is updated.      2-Fad pads. She was seen by endocrinology and cushing was ruled out in 4/2014. Was advised to do f/u for enlarged thyroid/thyroid nodules.    3-Hair loss. F/U with dermatology    4-Chronic pain. F/U with pain clinic    5-Concerns of subclinical hyperthyroidism: TSH is barely minimal, chart review shows  that those lowest levels are since April 2014. At last visit, discussed Endocrinology ref which pt wants to hold off in this visit.     6-Vit D deficiency. Was replaced with vit D 50, 000 units po qwk x 12 wk then 2000 units qd      PLAN: Follow up 3/6 add on    MEDICATION CHANGES: none      No orders of the defined types were placed in this encounter.      Majo Mckinnon MD

## 2019-03-06 NOTE — LETTER
Patient:  Janine Cornell  :   1965  MRN:     [id number]        Ms.Lisa CHELLE Cornell  [address] [id number]        2019    Dear ,    We are writing to inform you of your test results. No evidence of Wegener's or pleural effusion (fluid around the lungs), please let me know if your chest pain does not get better.      Results for orders placed or performed in visit on 19   CT Chest w/o contrast    Narrative    CT CHEST W/O CONTRAST  3/6/2019 3:00 PM    History:  Chest wall pain; eval for pleural effusion, Wegener's;  Wegener's vasculitis (H).     COMPARISON: CT cap dated 2017, CT chest dated 2015.    TECHNIQUE: CT imaging obtained through the chest without intravenous  contrast. Coronal and axial MIP reformatted images obtained.    FINDINGS:  Central tracheobronchial tree is patent. There is no mass,  consolidation or infiltration. No significant in lateral right middle  lobe traction bronchiectasis and honeycombing from 2017. Unchanged  7 x 4 mm solid pulmonary nodule in the left upper lobe posterior  laterally (image 86 series 5) from since at least 2015,  statistically benign. No pleural effusion or pneumothorax.    Heart size is within normal limits. There is no pericardial effusion.  Severe atherosclerotic calcifications along the coronary arteries and  stent placement. Normal thoracic vasculature. Scattered nonenlarged  mediastinal and hilum lymph nodes. Unchanged left hilum calcified  lymph nodes.    Chest wall is unremarkable.    Bones and soft tissues: No suspicious bone findings. Mild degenerative  changes of thoracic spine.    Partially imaged upper abdomen: Limited. Cholecystectomy.      Impression    IMPRESSION:   1.  No significant change in lateral right middle lobe traction  bronchiectasis and fibrosis, likely represent scarring.  2.  Stable 7 x 4 mm solid pulmonary nodule in the left upper lobe from  since at least 2015,  statistically benign.  3.  Chest wall is unremarkable.    I have personally reviewed the examination and initial interpretation  and I agree with the findings.    CAIT BURGESS MD     *Note: Due to a large number of results and/or encounters for the requested time period, some results have not been displayed. A complete set of results can be found in Results Review.     Majo Mckinnon MD

## 2019-03-06 NOTE — PROGRESS NOTES
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 Cornell is a 51 yo AAF who was referred to our clinic for evaluation and management of her joint pain in setting of positive RF 26.    Her joint symptoms first started more than a year ago, but over last 6-8 months fluctuating some good and bad days . All her joints are involved. Over last 5 months, hands became swollen, warm and painful. Tylenol does not help. Ketoprofen did not help. Was told to avoid NSAIDs given ACS. Reports AM/PM stiffness x 2-3 hr, can't make full fist when wakes up in AM.    She feels tired all the time. Reports worsening hair loss and unchanged  facial rash. Gets red sore flaky rash over cheeks across her face which is intermittent diagnosed Rosacea and is prescribed metronidazole gel which she has not started using. Reports occasional oral ulcers. Hands feel cold with red discoloration last winter. Has occasional dysphagia to both solids and liquid. Has dry eyes, is using OTC allergy eyedrops. Has chronic abdominal pain. Has h/o pancreatitis. Sometime has anxiety. Occasionally has numbness, tingling in fingers/toes. Has back and spine issues, her whole back and neck hurts, different areas at different time. She is wondering if she has FMS. Has hard time sleeping, has never been diagnosed with BRENNA. She does not know if he snores. She was found to have slightly positive RF in 2/2013. Has microcytic anemia.     Her arthralgia gets worse with drop in temperature. Reports body ache. Activity makes her pain worse. Her joint swelling isintermittent. Her body pain and joint pain is the same. Reports AM stiffness x 3 hr.    She has been doing acupuncture x few months and it is helping with her pain. She thinks that the combination of plaquenil and acupuncture is helpful but overall she notice  30% in her symptoms relief.     Takes tylenol and tramadol on occasion for pain.    She decided to use different formulation of metformin which helped with her abdominal pain. I referred her to GI for evaluation of elevated lipase and abdominal pain. She decided to see GI in the future.       She is on  mg qd since 5/2014, tolerates it well and it helps her some. Denies taking any gabapentin due to chest pain and headches. She has had referral her for water aerobics, but she can't begin until she's healed from recent surgery in 10/2014. She thinks HCQ is helping but not enough to control all her pain, reports 2-3 hr of AM stiffness with ongoing diffuse arthralgia/myalgia along with intermittent swelling of hands. She does see her acupuncture person and it helps with her pain, has not started water aerobics yet. Has got her flu shot.       10/2015: Reports having pleuritic CP in 3/2015, was prescribed Lidoderm patches first. It did not help. Took prednisone (?dose) by her own, pain got better but it recurred off prednisone and gradually got worse to the point that she could not stand the pain anymore, was seen in ER in 5/2015, was treated with medrol dose pack which helped. Reports pain over hips, knees, ankles and fingers. Pain gets worse with walking. Reports myalgia, swelling of the arms. Pain over neck, shoulders. Has AM stiffness x 3-4 hr. Fingers swell up and become painful. Can't remember if steroid helped with joint pain. She had headaches, severe CP when she took tramadol and gabapentin and stopped taking them, sx resolved but she can't tell which one caused SEs but thinks that probably it was gabapentin. She has good days and tries to be more active but activity aggravates the pain. Headaches are intermittent. HCQ helps some not enough to control all the pain. No HCQ SEs. Had eye exam around July 2015. Flexeril helps but PCP wants to change flexeril to zanaflex, reporting that she is NOT comfortable  with the change. Acupuncture still helps an she continued to  do that. Concerned about fat pads she has in supraclavicular area, has h/o thyroid nodules. Continues having hair loss, takes iron for iron deficiency.     2/2016: She has 2 major complaints today, increased joint pain/swelling in hands/feet and recent Dx of optic neuritis in R eye, reports having similar problem about 20 yr ago, now recurred. Has a spot in R eye vision x long term, was seen by ophthalmology few days ago and was told to have optic neuritis. Gets joint pain, muscle pain. Can't  objects, hands are swollen. Knees, hips and ankles are painful. Reports more arthralgia. AM stiffness/ pain is 3-4 hr. She wants me to talk to her ophthalmologist directly.      She had botox inj for migraines which did not help her. She is going to have angiogram next wk, had to take more nitro for CP recently.       4/29/2016: She reports being on steroid taper x 3 since last visit. Reportedly, had orbit MRI, it showed swelling/inflamamtion of R lacrimal glands. She had painful swelling of her R eye, cause her headaches. Botox inj made her headaches worse. She is being dealing with this since 1/2016, with severe headaches and pain/swelling around R eye. Had biopsy in 3/2016. She is frustrated as prednisone causes her weight gain and her BG is difficult to control on it.    5/2016: At last visit, was prescribed MTX 10 mg po qwk to take along with FA 1 mg qd. She decided not to take MTX, is afraid of side effects, believes all these medications would harm her. She also believes that botox caused her inflammation around R eye. No major flare up of per-orbital inflamamtion since last visit but continues to have swelling around her R eye.      11/2016: Reports diffuse body ache, arthralgia, myalgia especially with weather change. No major flare up of campos-orbital inflammation but her face/around R eye swells up from time to time. Reports 2 episodes of CP over past  2 mo, nitro sometimes helps and sometimes does not help. She has asthma and costochondritis and she has hard time to distinguish the origin of pain. Sometimes knees and fingers swell up. Her stiffness is sometimes all day.    4/2017:  Reports having sinusitis since last visit. Her R eye is dry and is using eyedrops to keep it moist. Reports having pain in ears. Was treated with antibiotics by PCP, it got better then it got worse. Reports catching infections easily. Then started having pressure over eyes with pain/headaches (exact start date is unknown). Pain gradually got worse and became persistent. Had sinus CT.     4/7/2017: Having a flare up of swelling, pain around her R orbit. Has not tried MTX yet.      5/2017: On MTX 10 mg po qwk since 4/7/2017, reports increased stomach pain and nausea and new headaches since start of MTX and it's not helping. Pain/swelling around R orbit is worse.    6/2017: She finished prednisone taper prescribed at last visit, R campos-orbital swelling significantly improved. 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    2/2018: R campos-orbital swelling has improved. Repeat R lacrimal gland biopsy in 8/2017 showed non specific inflammation with no lymphoma. She is off steroid. Did not tolerate AZA due to N/V and abdominal pain.      Is back with recurrence of R campos-orbital sweling x past 2 months. Pain really got worse over past 3wk, requries       9/2018: Declined ritiximab at last visit, lost f/u since 2/2018. Went to [place] and was seen on 8/29 by Dr. 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 Reportedly he ordered labs and recommended surgery since the inflammation of the R eye is back and is pushing the eye down. Janine took some prednsione 30 mg every day few days a go for pain.    Today: She had lateral  orbitotomy and debulking orbital mass right eye on 9/26/18 with Dr. Shah and Dr. Valdez at [facility]. 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 There were no complications according to the op note.    Janine finally agreed to receive rituximab for her GPA. She had it as 1 gr q2wk x 2 on 12/12/2018 and 12/26/2018. Had minor allergic reaction which was managed by IV solu cortef and benadryl. She is off prednisone about 6wk after surgery. Had bleeding ulcer from prednsione. Has pain over sinus, ears. Still has residual pain, swelling around her R eye is concerned about it. Wants to transfer her care to ophthalmology here as it's closer to her.    Cold induced sweating congestion joint pain    Facial swelling    allegy doctor clear ear pft ok doxy sinusitis    Her records were reviewed.        Component      Latest Ref Rng 2/28/2013 2/28/2013          12:14 PM 12:14 PM   WBC      4.0 - 11.0 10e9/L     RBC Count      3.8 - 5.2 10e12/L     Hemoglobin      11.7 - 15.7 g/dL     Hematocrit      35.0 - 47.0 %     MCV      78 - 100 fl     MCH      26.5 - 33.0 pg     MCHC      31.5 - 36.5 g/dL     RDW      10.0 - 15.0 %     Platelet Count      150 - 450 10e9/L     Specimen Description           Lyme Screen IgG and IgM           Vitamin D Deficiency screening      30 - 75 ug/L     Ferritin      10 - 300 ng/mL     Sed Rate      0 - 20 mm/h     ALLI Screen by EIA      <1.0     Rheumatoid Factor      0 - 14 IU/mL     CK Total      30 - 225 U/L  78   Uric Acid      2.5 - 7.5 mg/dL 6.7      Component      Latest Ref Rng 2/28/2013          12:14 PM   WBC      4.0 - 11.0 10e9/L    RBC Count      3.8 - 5.2 10e12/L    Hemoglobin      11.7 - 15.7 g/dL    Hematocrit      35.0 - 47.0 %    MCV      78 - 100 fl    MCH      26.5 - 33.0 pg    MCHC      31.5 - 36.5 g/dL    RDW      10.0 - 15.0 %    Platelet Count      150 - 450 10e9/L    Specimen Description       Serum   Lyme Screen IgG and IgM       Test value:  <0.75....Interpretation: Negative....If you highly suspect Lyme . . .   Vitamin D Deficiency screening      30 - 75 ug/L    Ferritin      10 - 300 ng/mL    Sed Rate      0 - 20 mm/h    ALLI Screen by EIA      <1.0    Rheumatoid Factor      0 - 14 IU/mL    CK Total      30 - 225 U/L    Uric Acid      2.5 - 7.5 mg/dL      Component      Latest Ref Rng 2/28/2013 2/28/2013 2/28/2013 2/28/2013          12:14 PM 12:14 PM 12:14 PM 12:14 PM   WBC      4.0 - 11.0 10e9/L       RBC Count      3.8 - 5.2 10e12/L       Hemoglobin      11.7 - 15.7 g/dL       Hematocrit      35.0 - 47.0 %       MCV      78 - 100 fl       MCH      26.5 - 33.0 pg       MCHC      31.5 - 36.5 g/dL       RDW      10.0 - 15.0 %       Platelet Count      150 - 450 10e9/L       Specimen Description             Lyme Screen IgG and IgM             Vitamin D Deficiency screening      30 - 75 ug/L       Ferritin      10 - 300 ng/mL    10   Sed Rate      0 - 20 mm/h   23 (H)    ALLI Screen by EIA      <1.0  <1.0 . . .     Rheumatoid Factor      0 - 14 IU/mL 26 (H)      CK Total      30 - 225 U/L       Uric Acid      2.5 - 7.5 mg/dL         Component      Latest Ref Rn 2/28/2013 2/28/2013          12:14 PM 12:14 PM   WBC      4.0 - 11.0 10e9/L 14.1 (H)    RBC Count      3.8 - 5.2 10e12/L 4.55    Hemoglobin      11.7 - 15.7 g/dL 10.7 (L)    Hematocrit      35.0 - 47.0 % 33.3 (L)    MCV      78 - 100 fl 73 (L)    MCH      26.5 - 33.0 pg 23.5 (L)    MCHC      31.5 - 36.5 g/dL 32.1    RDW      10.0 - 15.0 % 18.1 (H)    Platelet Count      150 - 450 10e9/L 407    Specimen Description           Lyme Screen IgG and IgM           Vitamin D Deficiency screening      30 - 75 ug/L  32   Ferritin      10 - 300 ng/mL     Sed Rate      0 - 20 mm/h     ALLI Screen by EIA      <1.0     Rheumatoid Factor      0 - 14 IU/mL     CK Total      30 - 225 U/L     Uric Acid      2.5 - 7.5 mg/dL          MRI CERVICAL SPINE WITHOUT CONTRAST 4/3/2013 12:47 PM    HISTORY: Cervicalgia.  Degeneration of cervical intervertebral disc.  MRI cervical spine. Evaluate bilateral supraclavicular lymph nodes.  Clinical enlargement.    TECHNIQUE: Multiplanar multisequence MRI of the cervical spine  without gadolinium contrast.    COMPARISON: None.    FINDINGS: The patient has a developmentally small central canal.  Images of the cervical cord reveals small areas of T2 hyperintensity  within the cervical cord. There is a small area of high signal  intensity at the C1 level. There is also a small area of high signal  intensity at the C6-C7 level. The area of high signal at C6-C7 is  located at an area of central spinal stenosis. This may be due to  myelomalacia. The area of high signal at C1-C2 is indeterminate.    C2-C3: Normal disc, facet joints, spinal canal and neural foramina.    C3-C4: Normal disc, facet joints, spinal canal and neural foramina.    C4-C5: Normal disc, facet joints, spinal canal and neural foramina.    C5-C6: There is degeneration of the disc. There is a mild annular  disc bulge. The central canal is mild-moderately narrowed. The  residual AP diameter of the central spinal canal measures  approximately 9 mm.    C6-C7: There is degeneration of the disc. There is loss of disc space  height. There is a diffuse annular disc bulge. There are associated  posterior osteophytes. There are severe central spinal stenosis at  this level. The residual AP diameter of the central spinal canal is  only about 6 mm. There is significant flattening of the cord. There  is high signal in the cord at this level consistent with  myelomalacia. There is moderate to severe right foraminal stenosis  due to and uncinate spur.    C7-T1: Normal disc, facet joints, spinal canal and neural foramina.            Result Impression       IMPRESSION:  1. Severe central spinal stenosis at C6-C7 due to a developmentally  small canal and due to a bulging disc and associated posterior  osteophyte. There is flattening of the cord  at this level and high  signal in the cord at this level consistent with myelomalacia.  2. There is a small, indeterminate 2-3 mm area of high signal  intensity in the cord at the C1 level. This could be due to gliosis  secondary to a previous infectious or inflammatory process.  Demyelinating disease could also have a similar appearance. Clinical  correlation suggested.    GAIL MORE MD     MRI LEFT UPPER EXTREMITY NON-JOINT WITHOUT CONTRAST, MRI RIGHT UPPER  EXTREMITY NON-JOINT WITHOUT CONTRAST April 3, 2013 1:32 PM    HISTORY: Cervicalgia. Degeneration of cervical intervertebral disc.    TECHNIQUE: Multiplanar, multisequence imaging of the brachial plexus  was performed without gadolinium contrast enhancement.    COMPARISON: None.    FINDINGS: No mass lesions are seen. No inflammatory process is  identified. The roots, trunks, branches, and divisions of both the  right and left brachial plexus appear normal. No adenopathy is seen.  The anterior scalene muscles and the middle scalene muscles appear  normal. Nodules are seen within the thyroid gland. The largest nodule  is seen in the left lobe of the thyroid. This measures about 1.8 cm  in diameter.            Result Impression       IMPRESSION:  1. Both the right and left brachial plexus regions appear normal.  2. Thyroid nodules. The largest nodule is in the left lobe of the  thyroid. It measures about 1.8 cm in diameter.       XR WRIST BILATERAL G/E 3 VIEWS    Narrative:        EXAMINATION:  1. Each hand 3 views  2. Each wrist 3 views     DATE: 5/1/2013     HISTORY: Arthropathy with concern for rheumatoid.     FINDINGS: 3 views of each hand and 3 views of each wrist are obtained  without prior for comparison. Alignment is normal. There is no  fracture or acute bone abnormality. No distinct erosions are seen.  Some spurring of the distal radial ulnar joint is noted on the right.       Impression:     IMPRESSION:  1. No evidence of an inflammatory arthropathy  involving either hand  or wrist.     VIELKA GUEVARA MD         XR FOOT BILATERAL G/E 3 VIEWS    Narrative:        EXAMINATION:  1. Each foot 3 views  2. Each ankle 3 views  3. Sacroiliac joint series     DATE: 5/1/2013     HISTORY: Arthropathy; concern for rheumatoid.     FINDINGS: No prior for comparison.     A frontal and bilateral oblique evaluation of the sacroiliac joints  shows no malalignment. Some patchy sclerosis is identified along the  central aspect of both sacroiliac joints, which is favored to be  degenerative. No distinct erosions are seen. The visualized portion  of the hip joint spaces appear maintained, with no erosive changes  identified. Mild endplate spurring is noted in the lower lumbar  spine.     3 views of each foot and 3 views of each ankle show no evidence of  acute fracture or dislocation. The metatarsal phalangeal,  tarsometatarsal and intertarsal joint spaces appear maintained. No  erosions are identified. There is no sign of acroosteolysis. The  ankle mortise and talar dome are intact bilaterally. Minimal spurring  is noted along the tip of the medial malleolus bilaterally.       Impression:     IMPRESSION:  1. Patchy sclerosis identified along the central aspect of both  sacroiliac joints, which is favored to be degenerative. No distinct  erosions are seen.  2. No evidence of an inflammatory arthropathy in either foot or ankle.     VIELKA GUEVARA MD     5/2013  CBC WITH PLATELETS DIFFERENTIAL       Component Value Range    WBC 11.3 (*) 4.0 - 11.0 10e9/L    RBC Count 4.56  3.8 - 5.2 10e12/L    Hemoglobin 11.1 (*) 11.7 - 15.7 g/dL    Hematocrit 34.3 (*) 35.0 - 47.0 %    MCV 75 (*) 78 - 100 fl    MCH 24.3 (*) 26.5 - 33.0 pg    MCHC 32.4  31.5 - 36.5 g/dL    RDW 16.1 (*) 10.0 - 15.0 %    Platelet Count 315  150 - 450 10e9/L    Diff Method Automated Method      % Neutrophils 71.6  40 - 75 %    % Lymphocytes 20.9  20 - 48 %    % Monocytes 4.3  0 - 12 %    % Eosinophils 2.8  0 - 6 %    % Basophils  0.2  0 - 2 %    % Immature Granulocytes 0.2  0 - 0.4 %    Absolute Neutrophil 8.1  1.6 - 8.3 10e9/L    Absolute Lymphocytes 2.4  0.8 - 5.3 10e9/L    Absolute Monoctyes 0.5  0.0 - 1.3 10e9/L    Absolute Eosinophils 0.3  0.0 - 0.7 10e9/L    Absolute Basophils 0.0  0.0 - 0.2 10e9/L    Abs Immature Granulocytes 0.0  0 - 0.03 10e9/L   AMYLASE       Component Value Range    Amylase 103  30 - 110 U/L   COMPREHENSIVE METABOLIC PANEL       Component Value Range    Sodium 144  133 - 144 mmol/L    Potassium 3.8  3.4 - 5.3 mmol/L    Chloride 105  94 - 109 mmol/L    Carbon Dioxide 23  20 - 32 mmol/L    Anion Gap 17  6 - 17 mmol/L    Glucose 173 (*) 60 - 99 mg/dL    Urea Nitrogen 13  5 - 24 mg/dL    Creatinine 0.83  0.52 - 1.04 mg/dL    GFR Estimate 74  >60 mL/min/1.7m2    GFR Estimate If Black 90  >60 mL/min/1.7m2    Calcium 9.7  8.5 - 10.4 mg/dL    Bilirubin Total 0.4  0.2 - 1.3 mg/dL    Albumin 4.3  3.9 - 5.1 g/dL    Protein Total 7.8  6.8 - 8.8 g/dL    Alkaline Phosphatase 66  40 - 150 U/L    ALT 36  0 - 50 U/L    AST 28  0 - 45 U/L   CK TOTAL       Component Value Range    CK Total 66  30 - 225 U/L   CRP INFLAMMATION       Component Value Range    CRP Inflammation 10.4 (*) 0.0 - 8.0 mg/L   LIPASE       Component Value Range    Lipase 353 (*) 20 - 250 U/L   ERYTHROCYTE SEDIMENTATION RATE AUTO       Component Value Range    Sed Rate 26 (*) 0 - 20 mm/h   ROUTINE UA WITH MICROSCOPIC REFLEX TO CULTURE       Component Value Range    Color Urine Yellow      Appearance Urine Slightly Cloudy      Glucose Urine 30 (*) NEG mg/dL    Bilirubin Urine Negative  NEG    Ketones Urine 5 (*) NEG mg/dL    Specific Gravity Urine 1.026  1.003 - 1.035    Blood Urine Trace (*) NEG    pH Urine 5.0  5.0 - 7.0 pH    Protein Albumin Urine 10 (*) NEG mg/dL    Urobilinogen mg/dL Normal  0.0 - 2.0 mg/dL    Nitrite Urine Negative  NEG    Leukocyte Esterase Urine Negative  NEG    Source Midstream Urine      WBC Urine 1  0 - 2 /HPF    RBC Urine 4 (*) 0 - 2  /HPF    Squamous Epithelial /HPF Urine <1  0 - 1 /HPF    Mucous Urine Present (*) NEG /LPF    Hyaline Casts 3 (*) 0 - 2 /LPF    Calcium Oxalate Moderate (*) NEG /HPF   COMPLEMENT C3       Component Value Range    Complement C3 143  76 - 169 mg/dL   COMPLEMENT C4       Component Value Range    Complement C4 31  15 - 50 mg/dL   HEPATITIS C ANTIBODY       Component Value Range    Hepatitis C Antibody Negative  NEG   CARDIOLIPIN ANTIBODY IGG AND IGM       Component Value Range    Cardiolipin IgG Marline    0 - 15.0 GPL    Value: <15.0      Interpretation:  Negative    Cardiolipin IgM Marline    0 - 12.5 MPL    Value: <12.5      Interpretation:  Negative   LUPUS PANEL       Component Value Range    Lupus Result    NEG    Value: Negative      (Note)      COMMENTS:      The INR is normal.      APTT is normal.  1:2 Mix is not indicated.      DRVVT Screen is normal.      Thrombin time is normal.      NEGATIVE TEST; A LUPUS ANTICOAGULANT WAS NOT DETECTED IN THIS      SPECIMEN WITHIN THE LIMITS OF THE TESTING REPERTOIRE.      If the clinical picture is strongly suggestive of an antiphospholipid      syndrome, recommend anticardiolipin and beta-2-glycoprotein (IgG and      IgM) antibody tests.      Leela Franks M.D.  [phone]      5/2/2013            INR =  0.93 N = 0.86-1.14  (No additional charge)      TT = 15.7 N = 13.0-19.0 sec  (No additional charge)            APTT'S:    Seconds      Reagent =  Stago LA      Normal  =  38      Patient  =  35      1:2 Mix  =  N/A      Reference =  31-43             DILUTE MADELINE VIPER VENOM TEST:      DRVVT Screen Ratio = 0.76 Normal = <1.21         IMMUNOGLOBULIN G SUBCLASSES       Component Value Range    IGG 1030  695 - 1620 mg/dL    IgG1 488  300 - 856 mg/dL    IgG2 325  158 - 761 mg/dL    IgG3 47  24 - 192 mg/dL    IgG4 18  11 - 86 mg/dL   SUNNY ANTIBODY PANEL       Component Value Range    Ribonucleic Protein IgG Antibody 0      Smith Antibody IgG 1      SSA (RO) Antibody IgG 4       SSB (LA) Antibody IgG 0      Scleroderma Antibody IgG 0     BETA 2 GLYCOPROTEIN ANTIBODIES IGG IGM       Component Value Range    Beta-2-Glycopro IgG 1      Beta-2-Glycopro IgM 5     CYCLIC CIT PEPT IGG       Component Value Range    Cyclic Cit Pept IgG/IgA    <20 UNITS    Value: <20      Interpretation:  Negative   DNA DOUBLE STRANDED ANTIBODIES       Component Value Range    DNA-ds    0 - 29 IU/mL    Value: <15      Interpretation:  Negative       Component      Latest Ref Rng 3/11/2014   Sodium      133 - 144 mmol/L 137   Potassium      3.4 - 5.3 mmol/L 4.6   Chloride      94 - 109 mmol/L 97   Carbon Dioxide      20 - 32 mmol/L 20   Anion Gap      6 - 17 mmol/L 20 (H)   Glucose      60 - 99 mg/dL 243 (H)   Urea Nitrogen      5 - 24 mg/dL 35 (H)   Creatinine      0.52 - 1.04 mg/dL 1.47 (H)   GFR Estimate      >60 mL/min/1.7m2 38 (L)   GFR Estimate If Black      >60 mL/min/1.7m2 46 (L)   Calcium      8.5 - 10.4 mg/dL 9.7   Bilirubin Total      0.2 - 1.3 mg/dL 0.3   Albumin      3.9 - 5.1 g/dL 4.8   Protein Total      6.8 - 8.8 g/dL 7.9   Alkaline Phosphatase      40 - 150 U/L 73   ALT      0 - 50 U/L 35   AST      0 - 45 U/L 30   Color Urine       Yellow   Appearance Urine       Clear   Glucose Urine      NEG mg/dL 500 (A)   Bilirubin Urine      NEG Negative   Ketones Urine      NEG mg/dL Negative   Specific Gravity Urine      1.003 - 1.035 1.020   Blood Urine      NEG Negative   pH Urine      5.0 - 7.0 pH 5.5   Protein Albumin Urine      NEG mg/dL Negative   Urobilinogen Urine      0.2 - 1.0 EU/dL 0.2   Nitrite Urine      NEG Negative   Leukocyte Esterase Urine      NEG Negative   Source       Midstream Urine   WBC      4.0 - 11.0 10e9/L 14.0 (H)   RBC Count      3.8 - 5.2 10e12/L 5.02   Hemoglobin      11.7 - 15.7 g/dL 12.4   Hematocrit      35.0 - 47.0 % 37.1   MCV      78 - 100 fl 74 (L)   MCH      26.5 - 33.0 pg 24.7 (L)   MCHC      31.5 - 36.5 g/dL 33.4   RDW      10.0 - 15.0 % 15.3 (H)   Platelet Count       150 - 450 10e9/L 420       Component      Latest Ref Rng 3/25/2014   Sodium      133 - 144 mmol/L 137   Potassium      3.4 - 5.3 mmol/L 3.7   Chloride      94 - 109 mmol/L 100   Carbon Dioxide      20 - 32 mmol/L 21   Anion Gap      6 - 17 mmol/L 16   Glucose      60 - 99 mg/dL 214 (H)   Urea Nitrogen      5 - 24 mg/dL 20   Creatinine      0.52 - 1.04 mg/dL 1.02   GFR Estimate      >60 mL/min/1.7m2 58 (L)   GFR Estimate If Black      >60 mL/min/1.7m2 70   Calcium      8.5 - 10.4 mg/dL 9.5   Phosphorus      2.5 - 4.5 mg/dL 3.7   Albumin      3.9 - 5.1 g/dL 4.6     Component      Latest Ref Rng 4/30/2014   CRP Inflammation      0.0 - 8.0 mg/L 10.0 (H)   Sed Rate      0 - 20 mm/h 39 (H)   Rheumatoid Factor      <20 IU/mL <20   Glucose-6-PO4 Dehydrogenase       17.7     Component      Latest Ref Rng 6/11/2014 7/15/2014   WBC      4.0 - 11.0 10e9/L 11.0    RBC Count      3.8 - 5.2 10e12/L 4.74    Hemoglobin      11.7 - 15.7 g/dL 11.8    Hematocrit      35.0 - 47.0 % 36.1    MCV      78 - 100 fl 76 (L)    MCH      26.5 - 33.0 pg 24.9 (L)    MCHC      31.5 - 36.5 g/dL 32.7    RDW      10.0 - 15.0 % 13.9    Platelet Count      150 - 450 10e9/L 434    Diff Method       Automated Method    % Neutrophils       59.2    % Lymphocytes       31.6    % Monocytes       5.9    % Eosinophils       2.6    % Basophils       0.5    % Immature Granulocytes       0.2    Absolute Neutrophil      1.6 - 8.3 10e9/L 6.5    Absolute Lymphocytes      0.8 - 5.3 10e9/L 3.5    Absolute Monoctyes      0.0 - 1.3 10e9/L 0.7    Absolute Eosinophils      0.0 - 0.7 10e9/L 0.3    Absolute Basophils      0.0 - 0.2 10e9/L 0.1    Abs Immature Granulocytes      0 - 0.4 10e9/L 0.0    Sodium      133 - 144 mmol/L 138 140   Potassium      3.4 - 5.3 mmol/L 3.9 3.8   Chloride      94 - 109 mmol/L 97 103   Carbon Dioxide      20 - 32 mmol/L 26 22   Anion Gap      6 - 17 mmol/L 14.9 15   Glucose      60 - 99 mg/dL 111 (H) 163 (H)   Urea Nitrogen      5 - 24 mg/dL  28 (H) 15   Creatinine      0.52 - 1.04 mg/dL 1.10 (H) 0.99   GFR Estimate      >60 mL/min/1.7m2 53 (L) 60 (L)   GFR Estimate If Black      >60 mL/min/1.7m2 64 72   Calcium      8.5 - 10.4 mg/dL 10.3 9.3   Bilirubin Total      0.2 - 1.3 mg/dL 0.6 0.2   Albumin      3.9 - 5.1 g/dL 5.1 4.2   Protein Total      6.8 - 8.8 g/dL 9.0 (H) 7.2   Alkaline Phosphatase      40 - 150 U/L 87 69   ALT      0 - 50 U/L 37 33   AST      0 - 45 U/L 40 26   Color Urine       Straw    Appearance Urine       Clear    Glucose Urine      NEG mg/dL Negative    Bilirubin Urine      NEG Negative    Ketones Urine      NEG mg/dL Negative    Specific Gravity Urine      1.003 - 1.035 1.005    Blood Urine      NEG Negative    pH Urine      5.0 - 7.0 pH 5.0    Protein Albumin Urine      NEG mg/dL Negative    Urobilinogen mg/dL      0.0 - 2.0 mg/dL Normal    Nitrite Urine      NEG Negative    Leukocyte Esterase Urine      NEG Negative    Source       Midstream Urine    Lipase      20 - 250 U/L 403 (H)    CRP Inflammation      0.0 - 8.0 mg/L <5.0    N-Terminal Pro Bnp      0 - 125 pg/mL  55   Hemoglobin A1C      4.3 - 6.0 %  7.0 (H)     Component      Latest Ref [facility] 11/12/2014   Sodium      133 - 144 mmol/L 135   Potassium      3.4 - 5.3 mmol/L 3.8   Chloride      94 - 109 mmol/L 104   Carbon Dioxide      20 - 32 mmol/L 24   Anion Gap      3 - 14 mmol/L 7   Glucose      70 - 99 mg/dL 125 (H)   Urea Nitrogen      7 - 30 mg/dL 17   Creatinine      0.52 - 1.04 mg/dL 1.18 (H)   GFR Estimate      >60 mL/min/1.7m2 49 (L)   GFR Estimate If Black      >60 mL/min/1.7m2 59 (L)   Calcium      8.5 - 10.1 mg/dL 9.8   WBC      4.0 - 11.0 10e9/L 11.1 (H)   RBC Count      3.8 - 5.2 10e12/L 4.05   Hemoglobin      11.7 - 15.7 g/dL 9.8 (L)   Hematocrit      35.0 - 47.0 % 30.6 (L)   MCV      78 - 100 fl 76 (L)   MCH      26.5 - 33.0 pg 24.2 (L)   MCHC      31.5 - 36.5 g/dL 32.0   RDW      10.0 - 15.0 % 15.5 (H)   Platelet Count      150 - 450 10e9/L 484 (H)   CT CHEST  PULMONARY EMBOLISM W CONTRAST 5/20/2015 4:57 PM  HISTORY: Pain. SOB. Elevated d-dimer.   TECHNIQUE: 65 mL Isovue 370. Axial images with coronal  reconstructions.  COMPARISON: None.  FINDINGS: Calcified granuloma with surrounding scarring in the  posterolateral left upper lobe. Triangular shaped opacity at the right  lung base in the lateral right middle lobe could represent some  scarring, atelectasis or infiltrate. There is also some scarring or  atelectasis in the posteromedial right lung base. Lungs otherwise  clear.  The pulmonary arteries are well opacified. No CT evidence for acute  pulmonary embolus. No aortic dissection.  Diffuse fatty infiltration of the liver.  IMPRESSION  IMPRESSION:   1. No pulmonary embolus identified.  2. Small focus of atelectasis, infiltrate or scarring in the lateral  right middle lobe.  3. Old granulomatous disease.  4. Otherwise negative.  SILVERIO VAZQUEZ MD  Component      Latest Ref Rng 3/27/2015   WBC      4.0 - 11.0 10e9/L 11.8 (H)   RBC Count      3.8 - 5.2 10e12/L 4.53   Hemoglobin      11.7 - 15.7 g/dL 11.0 (L)   Hematocrit      35.0 - 47.0 % 33.8 (L)   MCV      78 - 100 fl 75 (L)   MCH      26.5 - 33.0 pg 24.3 (L)   MCHC      31.5 - 36.5 g/dL 32.5   RDW      10.0 - 15.0 % 15.4 (H)   Platelet Count      150 - 450 10e9/L 419   Diff Method       Automated Method   % Neutrophils       75.3   % Lymphocytes       17.4   % Monocytes       4.4   % Eosinophils       2.5   % Basophils       0.2   % Immature Granulocytes       0.2   Absolute Neutrophil      1.6 - 8.3 10e9/L 8.9 (H)   Absolute Lymphocytes      0.8 - 5.3 10e9/L 2.1   Absolute Monoctyes      0.0 - 1.3 10e9/L 0.5   Absolute Eosinophils      0.0 - 0.7 10e9/L 0.3   Absolute Basophils      0.0 - 0.2 10e9/L 0.0   Abs Immature Granulocytes      0 - 0.4 10e9/L 0.0   Creatinine      0.52 - 1.04 mg/dL 1.12 (H)   GFR Estimate      >60 mL/min/1.7m2 52 (L)   GFR Estimate If Black      >60 mL/min/1.7m2 63   Iron      35 - 180 ug/dL  25 (L)   Iron Binding Cap      240 - 430 ug/dL 346   Iron Saturation Index      15 - 46 % 7 (L)   Albumin      3.4 - 5.0 g/dL 4.0   ALT      0 - 50 U/L 24   AST      0 - 45 U/L 15   CRP Inflammation      0.0 - 8.0 mg/L 28.0 (H)   Sed Rate      0 - 20 mm/h 81 (H)   Rheumatoid Factor      <20 IU/mL <20   Vitamin D Deficiency screening      30 - 75 ug/L 44   Ferritin      8 - 252 ng/mL 34     Component      Latest Ref Rng 7/29/2015   Sodium      133 - 144 mmol/L 137   Potassium      3.4 - 5.3 mmol/L 3.8   Chloride      94 - 109 mmol/L 106   Carbon Dioxide      20 - 32 mmol/L 23   Anion Gap      3 - 14 mmol/L 8   Glucose      70 - 99 mg/dL 148 (H)   Urea Nitrogen      7 - 30 mg/dL 17   Creatinine      0.52 - 1.04 mg/dL 1.02   GFR Estimate      >60 mL/min/1.7m2 58 (L)   GFR Estimate If Black      >60 mL/min/1.7m2 70   Calcium      8.5 - 10.1 mg/dL 9.0   Bilirubin Total      0.2 - 1.3 mg/dL 0.5   Albumin      3.4 - 5.0 g/dL 4.2   Protein Total      6.8 - 8.8 g/dL 7.4   Alkaline Phosphatase      40 - 150 U/L 64   ALT      0 - 50 U/L 23   AST      0 - 45 U/L 19     Results for FAVIO MARTINEZ (MRN [id number]) as of 10/30/2015 17:00   Ref. Range 8/21/2012 09:06 5/22/2013 14:22 4/14/2014 12:06 9/11/2014 12:35 12/4/2014 16:38   TSH Latest Range: 0.40-4.00 mU/L 0.83 0.43 0.27 (L) 0.23 (L) 0.22 (L)     Component      Latest Ref Rng 10/30/2015   WBC      4.0 - 11.0 10e9/L 13.4 (H)   RBC Count      3.8 - 5.2 10e12/L 4.76   Hemoglobin      11.7 - 15.7 g/dL 12.4   Hematocrit      35.0 - 47.0 % 37.9   MCV      78 - 100 fl 80   MCH      26.5 - 33.0 pg 26.1 (L)   MCHC      31.5 - 36.5 g/dL 32.7   RDW      10.0 - 15.0 % 14.5   Platelet Count      150 - 450 10e9/L 324   Diff Method       Automated Method   % Neutrophils       67.7   % Lymphocytes       22.7   % Monocytes       6.3   % Eosinophils       2.7   % Basophils       0.4   % Immature Granulocytes       0.2   Absolute Neutrophil      1.6 - 8.3 10e9/L 9.1 (H)   Absolute  Lymphocytes      0.8 - 5.3 10e9/L 3.1   Absolute Monoctyes      0.0 - 1.3 10e9/L 0.9   Absolute Eosinophils      0.0 - 0.7 10e9/L 0.4   Absolute Basophils      0.0 - 0.2 10e9/L 0.1   Abs Immature Granulocytes      0 - 0.4 10e9/L 0.0   Creatinine      0.52 - 1.04 mg/dL 1.21 (H)   GFR Estimate      >60 mL/min/1.7m2 47 (L)   GFR Estimate If Black      >60 mL/min/1.7m2 57 (L)   Iron      35 - 180 ug/dL 70   Iron Binding Cap      240 - 430 ug/dL 428   Iron Saturation Index      15 - 46 % 16   Albumin      3.4 - 5.0 g/dL 4.6   ALT      0 - 50 U/L 29   AST      0 - 45 U/L 21   CRP Inflammation      0.0 - 8.0 mg/L <2.9   Sed Rate      0 - 20 mm/h 8   Vitamin D Deficiency screening      20 - 75 ug/L 46   Ferritin      8 - 252 ng/mL 18   TSH      0.40 - 4.00 mU/L 0.49   T4 Free      0.76 - 1.46 ng/dL 1.06   Free T3      2.3 - 4.2 pg/mL 2.8     Component      Latest Ref Rng 11/17/2015   Testosterone Total      8 - 60 ng/dL 19   Sex Hormone Binding Globulin      30 - 135 nmol/L 32   Free Testosterone Calculated      0.11 - 0.58 ng/dL 0.34   Vitamin A       0.61   Retinol Palmitate       <0.02 . . .   Vitamin A Interp       Normal . . .   Thyroglobulin Antibody      <40 IU/mL <20   Thyroid Peroxidase Antibody      <35 IU/mL 31   DHEA Sulfate      35 - 430 ug/dL 101   Zinc       68     Component      Latest Ref Rng 1/27/2016   Sodium      133 - 144 mmol/L 135   Potassium      3.4 - 5.3 mmol/L 4.0   Chloride      94 - 109 mmol/L 104   Carbon Dioxide      20 - 32 mmol/L 24   Anion Gap      3 - 14 mmol/L 7   Glucose      70 - 99 mg/dL 88   Urea Nitrogen      7 - 30 mg/dL 20   Creatinine      0.52 - 1.04 mg/dL 1.13 (H)   GFR Estimate      >60 mL/min/1.7m2 51 (L)   GFR Estimate If Black      >60 mL/min/1.7m2 62   Calcium      8.5 - 10.1 mg/dL 9.4   Bilirubin Total      0.2 - 1.3 mg/dL 0.7   Albumin      3.4 - 5.0 g/dL 4.3   Protein Total      6.8 - 8.8 g/dL 7.7   Alkaline Phosphatase      40 - 150 U/L 66   ALT      0 - 50 U/L 24  "  AST      0 - 45 U/L 18   Cholesterol      <200 mg/dL 112   Triglycerides      <150 mg/dL 100   HDL Cholesterol      >49 mg/dL 34 (L)   LDL Cholesterol Calculated      <100 mg/dL 58   Non HDL Cholesterol      <130 mg/dL 78   N-Terminal Pro Bnp      0 - 125 pg/mL 29   Hemoglobin A1C      4.3 - 6.0 % 7.0 (H)   Amylase      30 - 110 U/L 60   Lipase      73 - 393 U/L 394 (H)   Troponin I ES      0.000 - 0.045 ug/L <0.015 . . .     Component      Latest Ref Rng 2/24/2016   Angiotensin Converting Enzyme       <5 (L) . . .   Neutrophil Cytoplasmic IgG Antibody       <1:20 . . .   Sed Rate      0 - 20 mm/h 86 (H)     Copath Report      Patient Name: FAVIO MARTINEZ   MR#: [id number]   Specimen #: [id number]   Collected: 3/15/2016   Received: 3/15/2016   Reported: 3/17/2016 13:33   Ordering Phy(s): PARVEEN ENRIQUEZ     ORIGINAL REPORT FOLLOWS ADDENDUM  ADDENDUM     TO ORIGINAL REPORT   Status: Signed Out   Date Ordered:3/18/2016   Date Complete:3/18/2016   Date Reported:3/18/2016 12:06   Signed Out By: Marianne Godfrey MD     INTERPRETATION:   This addendum is done to incorporate the results of fungal (GMS) stains   done on the specimen.  Specimen is negative for fungal organisms.  The   original final diagnosis remains unchanged.     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  Negative for malignancy(Please   see microscopic description)     Electronically signed out by:     Marianne Godfrey MD     CLINICAL HISTORY:   right orbital mass     GROSS:   The specimen is received labeled \"right orbital biopsy\" and consists of   red-tan nodule measuring 1.5 x 0.9 x 0.6 cm with one smooth side and   opposite rough side consistent with periosteum.  The specimen is   bisected revealing homogenous pale tan fleshy " cut surface.  Touch   preparations are prepared, air dried and fixed, portion of specimen is   submitted in RPMI for possible lymphoma workup [facility],   ([facility], [place], [place] ).  The remainder is entirely submitted.   (Dictated by: Marianne Godfrey MD 3/15/2016 04:45 PM)     MICROSCOPIC:     Specimen consists of portion of lacrimal gland with acute and chronic   inflammation.  Focal area of microabscess formation associated with   small areas of necrosis are also present.  Inflammation is seen   extending to the periorbital adipose tissue forming panniculitis.   Specimen is negative for malignancy.  Samples sent for immunophenotyping   to [facility], ([facility], [place], [place] ) reveals no evidence   of monoclonality or aberrant antigen expression.  A GMS (fungal) stain   is pending and results will be reported as an addendum.     CPT Codes:   A: 04011-FW7, 38944-UZZAE, SOH, 21407-MQRGM, 86128-FEIY     TESTING LAB LOCATION:   [address]  [address] [phone]     COLLECTION SITE:   [facility]   [facility]            Component      Latest Ref Rng 4/29/2016   Nucleated RBCs      0 /100 0   Absolute Neutrophil      1.6 - 8.3 10e9/L 8.9 (H)   Absolute Lymphocytes      0.8 - 5.3 10e9/L 3.2   Absolute Monocytes      0.0 - 1.3 10e9/L 0.8   Absolute Eosinophils      0.0 - 0.7 10e9/L 0.2   Absolute Basophils      0.0 - 0.2 10e9/L 0.1   Abs Immature Granulocytes      0 - 0.4 10e9/L 0.1   Absolute Nucleated RBC       0.0   IGG      695 - 1620 mg/dL 836   IgG1      300 - 856 mg/dL 280 (L)   IgG2      158 - 761 mg/dL 277   IgG3      24 - 192 mg/dL 35   IgG4      11 - 86 mg/dL 16   RNP Antibody IgG      0.0 - 0.9 AI <0.2 . . .   Smith SUNNY Antibody IgG      0.0 - 0.9 AI <0.2 . . .   SSA (Ro) (SUNNY) Antibody, IgG      0.0 - 0.9 AI <0.2 . . .   SSB (La) (SUNNY) Antibody, IgG      0.0 - 0.9 AI <0.2 . . .   Scleroderma  Antibody Scl-70 SUNNY IgG      0.0 - 0.9 AI <0.2 . . .   Cholesterol      <200 mg/dL 154   Triglycerides      <150 mg/dL 220 (H)   HDL Cholesterol      >49 mg/dL 42 (L)   LDL Cholesterol Calculated      <100 mg/dL 67   Non HDL Cholesterol      <130 mg/dL 111   M Tuberculosis Result      NEG Negative   M Tuberculosis Antigen Value       0.00   Albumin      3.4 - 5.0 g/dL 4.3   ALT      0 - 50 U/L 30   AST      0 - 45 U/L 10   Complement C3      76 - 169 mg/dL 157   Complement C4      15 - 50 mg/dL 32   CRP Inflammation      0.0 - 8.0 mg/L <2.9   Sed Rate      0 - 20 mm/h 2   DNA-ds      <10 IU/mL 1   Cyclic Citrullinated Peptide Antibody, IgG      <7 U/mL 1   Rheumatoid Factor      <20 IU/mL <20   Proteinase 3 Antibody IgG      0.0 - 0.9 AI <0.2 . . .   Myeloperoxidase Antibody IgG      0.0 - 0.9 AI 2.5 (H)   Vitamin D Deficiency screening      20 - 75 ug/L 24   Hemoglobin A1C      4.3 - 6.0 % 7.9 (H)   ALLI by IFA IgG       1:40 (A) . . .     Component      Latest Ref Rng & Units 4/7/2017   WBC      4.0 - 11.0 10e9/L 11.3 (H)   RBC Count      3.8 - 5.2 10e12/L 4.77   Hemoglobin      11.7 - 15.7 g/dL 12.5   Hematocrit      35.0 - 47.0 % 38.7   MCV      78 - 100 fl 81   MCH      26.5 - 33.0 pg 26.2 (L)   MCHC      31.5 - 36.5 g/dL 32.3   RDW      10.0 - 15.0 % 13.2   Platelet Count      150 - 450 10e9/L 347   Diff Method       Automated Method   % Neutrophils      % 67.1   % Lymphocytes      % 22.9   % Monocytes      % 6.2   % Eosinophils      % 3.0   % Basophils      % 0.4   % Immature Granulocytes      % 0.4   Nucleated RBCs      0 /100 0   Absolute Neutrophil      1.6 - 8.3 10e9/L 7.5   Absolute Lymphocytes      0.8 - 5.3 10e9/L 2.6   Absolute Monocytes      0.0 - 1.3 10e9/L 0.7   Absolute Eosinophils      0.0 - 0.7 10e9/L 0.3   Absolute Basophils      0.0 - 0.2 10e9/L 0.1   Abs Immature Granulocytes      0 - 0.4 10e9/L 0.1   Absolute Nucleated RBC       0.0   IGG      695 - 1620 mg/dL 962   IgG1      300 - 856 mg/dL  Test sent to [facility]. See ARMISC.   IgG2      158 - 761 mg/dL Test sent to [facility]. See ARMISC.   IgG3      24 - 192 mg/dL Test sent to [facility]. See ARMISC.   IgG4      11 - 86 mg/dL Test sent to [facility]. See ARMISC.   Result       SEE NOTE . . .   Test Name       IGG SUBCLASSES   Send Outs Misc Test Code       25533   Send Outs Misc Test Specimen       Serum   Creatinine      0.52 - 1.04 mg/dL 1.20 (H)   GFR Estimate      >60 mL/min/1.7m2 47 (L)   GFR Estimate If Black      >60 mL/min/1.7m2 57 (L)   Creatinine Urine      mg/dL    Albumin Urine mg/L      mg/L    Albumin Urine mg/g Cr      0 - 25 mg/g Cr    Myeloperoxidase Antibody IgG      0.0 - 0.9 AI 2.9 (H)   Proteinase 3 Antibody IgG      0.0 - 0.9 AI <0.2 . . .   Neutrophil Cytoplasmic IgG Antibody       1:80 (A) . . .   Angiotensin Converting Enzyme       <5 (L) . . .   Lab Scanned Result       TPMT GENOTYPE-Scanned   Vitamin C       56   ALT      0 - 50 U/L 23   Albumin      3.4 - 5.0 g/dL 4.3   AST      0 - 45 U/L 18   CRP Inflammation      0.0 - 8.0 mg/L 3.7   Sed Rate      0 - 30 mm/h 17   Vitamin D Deficiency screening      20 - 75 ug/L 40   Hemoglobin A1C      4.3 - 6.0 %      Component      Latest Ref Rng & Units 4/26/2017   WBC      4.0 - 11.0 10e9/L 11.8 (H)   RBC Count      3.8 - 5.2 10e12/L 4.23   Hemoglobin      11.7 - 15.7 g/dL 11.2 (L)   Hematocrit      35.0 - 47.0 % 35.0   MCV      78 - 100 fl 83   MCH      26.5 - 33.0 pg 26.5   MCHC      31.5 - 36.5 g/dL 32.0   RDW      10.0 - 15.0 % 13.4   Platelet Count      150 - 450 10e9/L 304   Diff Method       Automated Method   % Neutrophils      % 66.2   % Lymphocytes      % 22.7   % Monocytes      % 6.5   % Eosinophils      % 3.9   % Basophils      % 0.4   % Immature Granulocytes      % 0.3   Nucleated RBCs      0 /100 0   Absolute Neutrophil      1.6 - 8.3 10e9/L 7.8   Absolute Lymphocytes      0.8 - 5.3 10e9/L 2.7   Absolute Monocytes      0.0 - 1.3 10e9/L 0.8   Absolute Eosinophils      0.0 - 0.7 10e9/L 0.5    Absolute Basophils      0.0 - 0.2 10e9/L 0.1   Abs Immature Granulocytes      0 - 0.4 10e9/L 0.0   Absolute Nucleated RBC       0.0   IGG      695 - 1620 mg/dL    IgG1      300 - 856 mg/dL    IgG2      158 - 761 mg/dL    IgG3      24 - 192 mg/dL    IgG4      11 - 86 mg/dL    Result          Test Name          Send Outs Misc Test Code          Send Outs Misc Test Specimen          Creatinine      0.52 - 1.04 mg/dL 1.24 (H)   GFR Estimate      >60 mL/min/1.7m2 46 (L)   GFR Estimate If Black      >60 mL/min/1.7m2 55 (L)   Creatinine Urine      mg/dL 121   Albumin Urine mg/L      mg/L <5   Albumin Urine mg/g Cr      0 - 25 mg/g Cr Unable to calculate due to low value   Myeloperoxidase Antibody IgG      0.0 - 0.9 AI    Proteinase 3 Antibody IgG      0.0 - 0.9 AI    Neutrophil Cytoplasmic IgG Antibody          Angiotensin Converting Enzyme          Lab Scanned Result          Vitamin C          ALT      0 - 50 U/L 25   Albumin      3.4 - 5.0 g/dL 4.1   AST      0 - 45 U/L 15   CRP Inflammation      0.0 - 8.0 mg/L    Sed Rate      0 - 30 mm/h    Vitamin D Deficiency screening      20 - 75 ug/L    Hemoglobin A1C      4.3 - 6.0 % 7.8 (H)   EXAMINATION: CT CHEST ABDOMEN PELVIS W/O CONTRAST, 4/7/2017 2:59 PM     TECHNIQUE: Helical CT images from the thoracic inlet through the  symphysis pubis were obtained without IV contrast.     COMPARISON: CT chest on 5/20/2015. CT abdomen pelvis on 6/11/2014     HISTORY: Granuloma of right orbit. Concern for malignancy, lymphoma.     Additional history from the EMR: 50-year-old female with rheumatoid  arthritis and fibromyalgia. Presented on April 2016 with new right  orbital inflammatory mass, biopsied showed panniculitis without  infection or malignancy. Some intermittent swelling around her right  orbit.     FINDINGS:     Chest: Cardiac size is not enlarged. No pericardial effusion.  Calcified subcentimeter mediastinal and left hilar lymph nodes. No  thoracic adenopathy. Coronary  artery calcifications/stents.  Benign-appearing coarse calcifications in the thyroid, better  described on ultrasound thyroid from 9/13/2016.     Central airways are patent. No pneumothorax or pleural effusion.  Calcified pulmonary granuloma in the posterior left upper lobe,  benign. Small focus of subpleural fibrosis and cysts along the  anterior lateral right lower lobe (image 96 series 6).     Abdomen and pelvis: Cholecystectomy. The liver, adrenal glands,  kidneys, spleen, pancreas, stomach, duodenum are without focal  abnormality on these noncontrast images.     No abdominal aortic aneurysm. No suspicious adenopathy in the abdomen  or pelvis. Bladder is intact. Calcified fibroid off the uterine  fundus. IUD in the uterus.     No focal bowel wall thickening or obstruction. No free air or free  fluid. Appendix is normal. Small periumbilical hernia.     Bones and soft tissues: No suspicious osseous lesions. Unremarkable  superficial soft tissues.         IMPRESSION: No evidence of malignancy in the chest, abdomen, or  pelvis.        I have personally reviewed the examination and initial interpretation  and I agree with the findings.     CONNIE ARMANDOBLANCO      Component      Latest Ref Rng & Units 6/1/2017   WBC      4.0 - 11.0 10e9/L 12.0 (H)   RBC Count      3.8 - 5.2 10e12/L 5.18   Hemoglobin      11.7 - 15.7 g/dL 13.8   Hematocrit      35.0 - 47.0 % 41.0   MCV      78 - 100 fl 79   MCH      26.5 - 33.0 pg 26.6   MCHC      31.5 - 36.5 g/dL 33.7   RDW      10.0 - 15.0 % 14.8   Platelet Count      150 - 450 10e9/L 322   Diff Method       Automated Method   % Neutrophils      % 76.7   % Lymphocytes      % 16.5   % Monocytes      % 4.6   % Eosinophils      % 1.9   % Basophils      % 0.3   Absolute Neutrophil      1.6 - 8.3 10e9/L 9.2 (H)   Absolute Lymphocytes      0.8 - 5.3 10e9/L 2.0   Absolute Monocytes      0.0 - 1.3 10e9/L 0.6   Absolute Eosinophils      0.0 - 0.7 10e9/L 0.2   Absolute Basophils      0.0 - 0.2  10e9/L 0.0   Color Urine       Yellow   Appearance Urine       Clear   Glucose Urine      NEG mg/dL Negative   Bilirubin Urine      NEG Negative   Ketones Urine      NEG mg/dL Negative   Specific Gravity Urine      1.003 - 1.035 1.010   pH Urine      5.0 - 7.0 pH 5.5   Protein Albumin Urine      NEG mg/dL Negative   Urobilinogen Urine      0.2 - 1.0 EU/dL 0.2   Nitrite Urine      NEG Negative   Blood Urine      NEG Negative   Leukocyte Esterase Urine      NEG Negative   Source       Midstream Urine   WBC Urine      0 - 2 /HPF O - 2   RBC Urine      0 - 2 /HPF O - 2   Squamous Epithelial /LPF Urine      FEW /LPF Few   Bacteria Urine      NEG /HPF Few (A)   Creatinine      0.52 - 1.04 mg/dL 1.19 (H)   GFR Estimate      >60 mL/min/1.7m2 48 (L)   GFR Estimate If Black      >60 mL/min/1.7m2 58 (L)   Protein Random Urine      g/L <0.05   Protein Total Urine g/gr Creatinine      0 - 0.2 g/g Cr Unable to calculate due to low value   Myeloperoxidase Antibody IgG      0.0 - 0.9 AI 1.9 (H)   Proteinase 3 Antibody IgG      0.0 - 0.9 AI <0.2 . . .   ALT      0 - 50 U/L 29   AST      0 - 45 U/L 18   Albumin      3.4 - 5.0 g/dL 4.6   CRP Inflammation      0.0 - 8.0 mg/L 6.1   Sed Rate      0 - 30 mm/h 13   Creatinine Urine Random      mg/dL 54   Neutrophil Cytoplasmic IgG Antibody       <1:20 . . .   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      Comparison: MRI brain 5/29/2013      Technique:   Orbits: Axial and coronal T1-weighted, and coronal T2-weighted images  obtained without intravenous contrast. Post-intravenous contrast  (using gadolinium) sagittal FLAIR, were obtained with fat-saturation,  focused on the orbits.  Brain: Axial susceptibility-weighted and FLAIR  sequences were obtained  of the whole brain without intravenous contrast, and postcontrast  axial T1-weighted images were obtained through the whole brain.   Contrast: 7.5mL Gadavist     Findings:  New asymmetric enlargement of the right lacrimal gland and enhancement  of the right lacrimal gland with surrounding ill-defined crescentic T2  hyperintense signal and enhancement within the right superior  superotemporal orbit, predominantly involving the extraconal space  with slight intraconal extension. No definite associated restricted  diffusion. No discrete extraocular muscle enlargement. Normal  symmetric optic nerve signal. Cavernous sinuses and orbital apices are  normal. Globes are normal.     Whole brain images are symmetric minimal leukoaraiosis and generalized  parenchymal volume loss. Ventricles are not enlarged. No intracranial  hemorrhage, mass effect, midline shift or abnormal extra axial fluid  collection. No abnormal foci of intracranial enhancement or restricted  diffusion. Paranasal sinuses and mastoid air cells are clear.            Impression:    New abnormal enlargement of the right lacrimal gland with surrounding  ill-defined T2 hyperintense signal and enhancement centered in the  superotemporal right orbital fat, which may represent   infectious/inflammatory dacryadenitis, orbital pseudotumor, lymphoma,  carcinoma, sarcoidosis or IgG4 disease..     I have personally reviewed the examination and initial interpretation  and I agree with the findings.     PATRICIA BRANTLEY MD      Component      Latest Ref Rng & Units 6/1/2017   WBC      4.0 - 11.0 10e9/L 12.0 (H)   RBC Count      3.8 - 5.2 10e12/L 5.18   Hemoglobin      11.7 - 15.7 g/dL 13.8   Hematocrit      35.0 - 47.0 % 41.0   MCV      78 - 100 fl 79   MCH      26.5 - 33.0 pg 26.6   MCHC      31.5 - 36.5 g/dL 33.7   RDW      10.0 - 15.0 % 14.8   Platelet Count      150 - 450 10e9/L 322   Diff Method       Automated Method   % Neutrophils      %  76.7   % Lymphocytes      % 16.5   % Monocytes      % 4.6   % Eosinophils      % 1.9   % Basophils      % 0.3   Absolute Neutrophil      1.6 - 8.3 10e9/L 9.2 (H)   Absolute Lymphocytes      0.8 - 5.3 10e9/L 2.0   Absolute Monocytes      0.0 - 1.3 10e9/L 0.6   Absolute Eosinophils      0.0 - 0.7 10e9/L 0.2   Absolute Basophils      0.0 - 0.2 10e9/L 0.0   Color Urine       Yellow   Appearance Urine       Clear   Glucose Urine      NEG mg/dL Negative   Bilirubin Urine      NEG Negative   Ketones Urine      NEG mg/dL Negative   Specific Gravity Urine      1.003 - 1.035 1.010   pH Urine      5.0 - 7.0 pH 5.5   Protein Albumin Urine      NEG mg/dL Negative   Urobilinogen Urine      0.2 - 1.0 EU/dL 0.2   Nitrite Urine      NEG Negative   Blood Urine      NEG Negative   Leukocyte Esterase Urine      NEG Negative   Source       Midstream Urine   WBC Urine      0 - 2 /HPF O - 2   RBC Urine      0 - 2 /HPF O - 2   Squamous Epithelial /LPF Urine      FEW /LPF Few   Bacteria Urine      NEG /HPF Few (A)   Creatinine      0.52 - 1.04 mg/dL 1.19 (H)   GFR Estimate      >60 mL/min/1.7m2 48 (L)   GFR Estimate If Black      >60 mL/min/1.7m2 58 (L)   Protein Random Urine      g/L <0.05   Protein Total Urine g/gr Creatinine      0 - 0.2 g/g Cr Unable to calculate due to low value   Myeloperoxidase Antibody IgG      0.0 - 0.9 AI 1.9 (H)   Proteinase 3 Antibody IgG      0.0 - 0.9 AI <0.2 . . .   ALT      0 - 50 U/L 29   AST      0 - 45 U/L 18   Albumin      3.4 - 5.0 g/dL 4.6   CRP Inflammation      0.0 - 8.0 mg/L 6.1   Sed Rate      0 - 30 mm/h 13   Creatinine Urine Random      mg/dL 54   Neutrophil Cytoplasmic IgG Antibody       <1:20 . . .   Creatinine Urine      mg/dL 54       Patient Name: FAVIO MARTINEZ   MR#: [id number]   Specimen #: [id number]   Collected: 8/4/2017   Received: 8/4/2017   Reported: 8/9/2017 16:19   Ordering Phy(s): CRISTHIAN FLORES     For improved result formatting, select 'View Enhanced Report Format'   under  "Linked Documents section.     SPECIMEN(S):   Eye, right lacrimal gland     FINAL DIAGNOSIS:   Eye, right, \"lacrimal gland\", biopsy   - Dense fibrosis and patchy inflammation   - No residual lacrimal gland seen     COMMENT:   Sections show tissue involved by dense fibrosis and patchy inflammation.   There is no morphologic or immunohistochemical evidence of lymphoma. By   separate report, concurrent flow cytometry studies ([id number]),   performed at the [facility], show polytypic B cells and no   aberrant immunophenotype on T cells. Immunohistochemical stains for IgG   and IgG-4 were performed, which provide no support for IgG-4-related   disease. Some of these changes may be related to prior surgery. Sjögren   disease is not excluded. There is no evidence of malignancy. Dr. Max Conway has reviewed this case and concurs.     Electronically signed out by:     Antonella Beth M.D.   INDICATION:  Right eye edema. Reported history of right orbital biopsy yielding pathology consistent with idiopathic inflammation.    TECHNIQUE:  Noncontrast CT images were obtained through the brain and facial bones.    COMPARISON:  CT sinus 03/27/2017, MRI brain and orbits 02/15/2016.     FINDINGS:  CT facial bones:   Large soft tissue lesion centered within the lateral intraconal and extraconal right orbit has significantly increased in size compared to the CT dated 03/27/2017. The lesion is inseparable from the right superior, lateral, and inferior rectus muscles, as well as the right lacrimal gland. The lesion demonstrates extension posteriorly slightly proximal to the orbital apex, as well as extension into the medial orbit superiorly and anteriorly. Mass effect includes medial bowing of the optic nerve sheath complex and pronounced proptosis of the right globe.  No mass is identified in the right orbit.  Torus palatinus.   Minimal mucosal thickening in the ethmoid air cells. The remainder of the visualized " paranasal sinuses are clear.  CT brain:  The ventricles and sulci within normal limits for patient age. No mass effect or midline shift. The gray-white differentiation is maintained.  No acute intracranial hemorrhage or pathologic extra-axial fluid collection.  The calvarium is intact. The mastoid air cells are clear.    IMPRESSION:  1. Large soft tissue lesion centered within the lateral intraconal and extraconal right orbit has significantly increased in size compared to the CT dated 03/27/2017. The lesion is inseparable from the right lacrimal gland and rectus musculature. Mass effect includes medial bowing of the optic nerve sheath complex and pronounced proptosis of the right globe. Given provided history, findings may represent a progressive inflammatory etiology (pseudotumor). Other differential considerations, such as sarcoidosis or lymphoma, could also have this appearance.  2. No acute intracranial hemorrhage or intracranial mass effect.    Please note that all CT scans at this facility use dose modulation, iterative reconstruction, and/or weight-based dosing when appropriate to reduce radiation dose to as low as reasonably achievable.    Dictated by Charles Ward MD @ Jul 16 2018  3:54PM    Signed by Dr. Charles Ward @ Jul 16 2018  4:20PM    ROS:  A comprehensive ROS was done, positives are per HPI.        HISTORY REVIEW:  Past Medical History:   Diagnosis Date     Abnormal glandular Papanicolaou smear of cervix 1992     Allergic rhinitis     Allergy, airborne subst     Arthritis      ASCVD (arteriosclerotic cardiovascular disease)      Chronic pain      Chronic pancreatitis (H)     idiopathic, Tx: PPI, antioxidants, pancreatic enzymes     Common migraine      Coronary artery disease      Costochondritis      Difficulty in walking(719.7)      Dyspnea on exertion      Ectasia, mammary duct     [facility], persistent nipple discharge     Elevated fasting glucose      Gastro-oesophageal  reflux disease      Hernia      History of angina      Hyperlipidemia LDL goal < 100      Hypertension goal BP (blood pressure) < 140/90     Essential hypertension     Iron deficiency anemia      Ischemic cardiomyopathy      Menorrhagia      Migraine headaches      Mild persistent asthma      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 Matias, neg w/u, Rx'd w/prednisone     Ventral hernia, unspecified, without mention of obstruction or gangrene 2012     Past Surgical History:   Procedure Laterality Date     C ECHO HEART XTHORACIC,COMPLETE, W/O DOPPLER  04    [facility] WNL, EF 60%     C/SECTION, LOW TRANSVERSE           CARDIAC SURGERY      cardiac stent- recent in 16; 4 other stents     DILATION AND CURETTAGE N/A 2016    Procedure: DILATION AND CURETTAGE;  Surgeon: Nahed Butler MD;  Location: [facility]     HC UGI ENDOSCOPY W EUS  08    Dr. Pastrana, possible chronic pancreatitis, fatty liver     HERNIA REPAIR  2012    done at [facility]     INSERT INTRAUTERINE DEVICE N/A 2016    Procedure: INSERT INTRAUTERINE DEVICE;  Surgeon: Nahed Butler MD;  Location: [facility]     INT UTERINE FIBRIOD EMBOLIZATION  10/29/2014     LAPAROSCOPIC CHOLECYSTECTOMY  08    Dr. Arnol GRUBBS TX, CERVICAL      s/p LEEP     ORBITOTOMY Right 3/15/2016     Procedure: ORBITOTOMY;  Surgeon: Myron Cyr MD;  Location: [facility]     ORBITOTOMY Right 2017    Procedure: ORBITOTOMY;  RIGHT ORBITOTOMY AND BIOPSY;  Surgeon: Charis Holbrook MD;  Location: [facility]     REPAIR PTOSIS Right 2017    Procedure: REPAIR PTOSIS;  RIGHT UPPER LID PTOSIS REPAIR;  Surgeon: Myron Cyr MD;  Location: [facility]     UPPER GI ENDOSCOPY  10/21/08    mild gastritis, Dr. Hidalgo     Family History   Problem Relation Age of Onset     Heart Disease Father 50        heart attack     Cerebrovascular Disease Father      Diabetes Father      Hypertension Father      Depression Father      C.A.D. Father      Hypertension Mother      Arthritis Mother      Heart Disease Mother         long qt syndrome     Thyroid Disease Mother      C.A.D. Mother      Heart Disease Brother 15        MI at 15, 16.      Diabetes Maternal Uncle      Hypertension Maternal Aunt      Hypertension Maternal Uncle      Arthritis Brother         he passed away, had severe arthritis at age 11     Arthritis Maternal Uncle      Eye Disorder Maternal Uncle         cataracts     Neurologic Disorder Sister         migraines     Neurologic Disorder Sister         migraines     Respiratory Son         asthma     Cerebrovascular Disease Maternal Uncle      C.A.D. Brother      Family History Negative Other         neg for RA, SLE     Unknown/Adopted No family hx of         unknown neurological issues in her family, mother was adopted     Skin Cancer No family hx of         No known family hx of skin cancer     Social History     Socioeconomic History     Marital status: Single     Spouse name: Not on file     Number of children: 1     Years of education: Not on file     Highest education level: Not on file   Occupational History     Employer: NONE    Social Needs     Financial resource strain: Not on file     Food insecurity:     Worry: Not on file     Inability: Not on file     Transportation needs:      Medical: Not on file     Non-medical: Not on file   Tobacco Use     Smoking status: Former Smoker     Packs/day: 0.20     Years: 1.00     Pack years: 0.20     Types: Cigarettes     Last attempt to quit: 2/1/2016     Years since quitting: 3.0     Smokeless tobacco: Never Used   Substance and Sexual Activity     Alcohol use: No     Alcohol/week: 0.0 oz     Drug use: No     Sexual activity: Not Currently   Lifestyle     Physical activity:     Days per week: Not on file     Minutes per session: Not on file     Stress: Not on file   Relationships     Social connections:     Talks on phone: Not on file     Gets together: Not on file     Attends Orthodox service: Not on file     Active member of club or organization: Not on file     Attends meetings of clubs or organizations: Not on file     Relationship status: Not on file     Intimate partner violence:     Fear of current or ex partner: Not on file     Emotionally abused: Not on file     Physically abused: Not on file     Forced sexual activity: Not on file   Other Topics Concern     Parent/sibling w/ CABG, MI or angioplasty before 65F 55M? Yes   Social History Narrative    Balanced Diet - sometimes    Osteoporosis Prevention Measures - Dairy servings per day: 2 servings weekly    Regular Exercise -  Yes Describe walking 4 times a week    Dental Exam - NO    Seatbelts used - Yes    Self Breast Exam - Yes    Abuse: Current or Past (Physical, Sexual or Emotional)- No    Do you have any concerns about STD's -  No    Do you feel safe in your environment - No    Guns stored in the home - No    Sunscreen used - Yes    Lipids -  YES - Date: 053102    Glucose -  YES - Date: 012804    Eye Exam - YES - Date: one year ago    Colon Cancer Screening - No    Hemoccults - NO    Pap Test -  YES - Date: 070904, remote history of LEEP    Mammography - YES - Date: last spring, would like to discuss, needs [facility]    DEXA - NO    Immunizations reviewed and  up to date - Yes, last td given in  or      Patient Active Problem List   Diagnosis     Seasonal affective disorder (H)     Allergic rhinitis     PCOS (polycystic ovarian syndrome)     Moderate persistent asthma     Chronic pancreatitis (H)     Hypertension goal BP (blood pressure) < 140/90     Common migraine     NSTEMI (non-ST elevated myocardial infarction) (H)     Hyperlipidemia LDL goal <70     ASCVD (arteriosclerotic cardiovascular disease)     GERD (gastroesophageal reflux disease)     Ischemic cardiomyopathy     Hypertensive heart disease     Uterine leiomyoma     Iron deficiency anemia     Costochondritis     Vitamin D deficiency     Breast cancer screening     Spinal stenosis in cervical region     Fibromyalgia     Seronegative rheumatoid arthritis (H)     Type 2 diabetes, HbA1C goal < 8% (H)     Type 2 diabetes mellitus with other specified complication (H)     Hyperlipidemia associated with type 2 diabetes mellitus (H)     Hypertension associated with diabetes (H)     Overweight with body mass index (BMI) 25.0-29.9     Tobacco use disorder     Telogen effluvium     CAD S/P percutaneous coronary angioplasty     Status post coronary angiogram     ANCA-associated vasculitis (H)     Wegener's vasculitis (H)       Pregnancy Hx: She is . All misscarriages were in first trimester. H/o OC use in the past. 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    PMHx, FHx, SHx were reviewed, unchanged.      Outpatient Encounter Medications as of 3/6/2019   Medication Sig Dispense Refill     acetaminophen (TYLENOL) 325 MG tablet Take 1-2 tablets (325-650 mg) by mouth every 6 hours as needed for pain (headache) 250 tablet 0     albuterol (2.5 MG/3ML) 0.083% neb solution INL 1 VIAL VIA NEBULIZATION Q 4 TO 6 HOURS PRN  1     albuterol (PROAIR HFA, PROVENTIL HFA, VENTOLIN HFA) 108 (90 BASE) MCG/ACT inhaler Inhale 2 puffs into the lungs every 6 hours as needed for shortness of breath / dyspnea or  wheezing 3 Inhaler 1     ASPIRIN LOW DOSE 81 MG EC tablet Take 1 tablet (81 mg) by mouth daily 90 tablet 3     BASAGLAR 100 UNIT/ML injection Inject 40 Units Subcutaneous daily 12 mL 2     blood glucose monitoring (NO BRAND SPECIFIED) meter device kit Use to test blood sugar 4 X times daily or as directed. 1 kit 0     blood glucose monitoring (NO BRAND SPECIFIED) test strip 1 strip by In Vitro route 4 times daily Test as directed. Please dispense three months and three months of refills. Thank you. 360 each 3     blood glucose monitoring (ONE TOUCH DELICA) lancets Use to test blood sugars 4 times daily or as directed. 4 Box 3     calcium carbonate (TUMS) 500 MG chewable tablet Take 3-4 chew tab by mouth daily as needed.       clopidogrel (PLAVIX) 75 MG tablet Take 1 tablet (75 mg) by mouth daily 90 tablet 2     COMPRESSION STOCKINGS 2 each daily Apply one 10-15 mmHg compression stocking to each lower extgmierty during the day and remove before bedtime. 4 each 2     cyclobenzaprine (FLEXERIL) 10 MG tablet Take 1 tablet (10 mg) by mouth 2 times daily as needed for muscle spasms 180 tablet 0     cycloSPORINE (RESTASIS) 0.05 % ophthalmic emulsion Place 1 drop into the right eye every 12 hours       desonide (DESOWEN) 0.05 % cream Apply topically 2 times daily       dicyclomine (BENTYL) 20 MG tablet TAKE 1 TABLET(20 MG) BY MOUTH FOUR TIMES DAILY AS NEEDED 60 tablet 3     diphenhydrAMINE (BENADRYL) 25 MG capsule TK 1 TO 2 CS PO QHS  4     doxycycline hyclate (VIBRA-TABS) 100 MG tablet        EPIPEN 2-RIKY 0.3 MG/0.3ML injection INJECT 0.3 MG INTO THE MUSCLE PRF ANAPHYALAXIS  0     ferrous gluconate (FERGON) 324 (38 Fe) MG tablet Take 1 tablet (324 mg) by mouth 2 times daily (with meals) 180 tablet 3     fexofenadine (ALLEGRA) 180 MG tablet Take 1 tablet by mouth daily as needed. 90 tablet 3     fluocinolone (SYNALAR) 0.01 % solution Apply topically daily as needed       fluocinonide (LIDEX) 0.05 % external solution Apply  topically 2 times daily To areas of itch on the scalp as needed. 60 mL 11     fluticasone-vilanterol (BREO ELLIPTA) 100-25 MCG/INH inhaler Inhale 1 puff into the lungs daily       folic acid (FOLVITE) 1 MG tablet Take 1 tablet by mouth daily 90 tablet 3     hydroxychloroquine (PLAQUENIL) 200 MG tablet Take 2 tablets (400 mg) by mouth daily 180 tablet 1     insulin pen needle (BD MARCK U/F) 32G X 4 MM USE DAILY OR AS DIRECTED 300 each 3     ketoconazole (NIZORAL) 2 % cream Apply topically as needed   3     ketoconazole (NIZORAL) 2 % shampoo Apply topically daily as needed       lifitegrast (XIIDRA) 5 % opthalmic solution Place 1 drop into both eyes 2 times daily 20 each 3     lisinopril-hydrochlorothiazide (PRINZIDE/ZESTORETIC) 20-25 MG tablet Take 1 tablet by mouth daily 90 tablet 2     Magnesium Oxide -Mg Supplement 250 MG TABS TK 1 T PO BID. REDUCE IF STOOLS LOOSEN  11     metFORMIN (GLUCOPHAGE-XR) 500 MG 24 hr tablet TAKE 2 TABLETS(1000 MG) BY MOUTH DAILY WITH DINNER 180 tablet 0     metoclopramide (REGLAN) 10 MG tablet Take 1 tablet (10 mg) by mouth 4 times daily (before meals and nightly) 90 tablet 0     metoprolol succinate ER (TOPROL-XL) 100 MG 24 hr tablet Take 1 tablet (100 mg) by mouth daily 90 tablet 2     metroNIDAZOLE (NORITATE) 1 % cream Apply topically daily       montelukast (SINGULAIR) 10 MG tablet Take 1 tablet (10 mg) by mouth At Bedtime 30 tablet 1     Multiple Vitamin (DAILY-GERALD) TABS Take 1 tablet by mouth daily  0     nitroGLYcerin (NITROSTAT) 0.4 MG sublingual tablet Place 1 tablet (0.4 mg) under the tongue every 5 minutes as needed for chest pain 25 tablet 1     nystatin (MYCOSTATIN) 070330 UNITS TABS tablet TK 2 TS PO BID  0     olopatadine (PATANOL) 0.1 % ophthalmic solution Place 1 drop into both eyes 2 times daily as needed for allergies. 5 mL 12     ondansetron (ZOFRAN-ODT) 8 MG ODT tab Take 1 tablet (8 mg) by mouth every 8 hours as needed for nausea 60 tablet 1     Ondansetron HCl  (ZOFRAN PO)        pantoprazole (PROTONIX) 40 MG EC tablet Take 1 tablet (40 mg) by mouth daily Pork free tablets. 30 tablet 1     pravastatin (PRAVACHOL) 40 MG tablet Take 1 tablet (40 mg) by mouth daily 90 tablet 2     ranitidine (ZANTAC) 150 MG tablet Take 1 tablet (150 mg) by mouth 2 times daily 180 tablet 1     spironolactone (ALDACTONE) 50 MG tablet Take 1 tablet (50 mg) by mouth daily . Take additional 0.5 tablets by mouth once daily as needed for weight gain. 90 tablet 2     sucralfate (CARAFATE) 1 GM tablet Take 1 tablet (1 g) by mouth 4 times daily 120 tablet 3     traMADol (ULTRAM) 50 MG tablet Take 1 tablet (50 mg) by mouth every 8 hours as needed for moderate pain 60 tablet 3     Triamcinolone Acetonide (NASACORT ALLERGY 24HR NA)        vitamin D (ERGOCALCIFEROL) 28722 UNIT capsule Take 1 capsule (50,000 Units) by mouth every 7 days Need a Vitamin D level in 2 months. (Patient taking differently: Take 50,000 Units by mouth every 7 days Need a Vitamin D level in 2 months.) 8 capsule 0     [] lidocaine (viscous) 15 mL, alum & mag hydroxide-simethicone 15 mL GI Cocktail Take 30 mLs by mouth once for 1 dose 30 mL 0     lidocaine (viscous), alum & mag hydroxide-simethicone GI Cocktail 30 ml times on po now (Patient not taking: Reported on 2018) 30 mL 0     lidocaine (XYLOCAINE) 5 % ointment Apply 1 g topically 2 times daily as needed for moderate pain (Patient not taking: Reported on 2018) 50 g 5     [] order for DME 1 Device once for 1 dose Equipment being ordered: Wedge pillow 1 each 0     No facility-administered encounter medications on file as of 3/6/2019.        Allergies   Allergen Reactions     Amoxicillin-Pot Clavulanate      Augmentin      Unknown possible hives and edema     Azithromycin      Diatrizoate Other (See Comments)     Pt wants this listed because she is allergic to shellfish      Imitrex [Sumatriptan]      Severe face/neck/chest tightness and flushing side  "effects      Penicillins Hives     Unknown      Pork Allergy      Stomach pain, cramping, diarrhea, itching, nausea and headaches     Shellfish Allergy Hives and Swelling     Sulfa Drugs Hives and Swelling     Zithromax [Azithromycin Dihydrate] Swelling     Unknown          Ph.E:    Vitals:    03/06/19 1205   BP: 134/81   Pulse: 93   Temp: 98.5  F (36.9  C)   TempSrc: Oral   SpO2: 100%   Weight: 77.6 kg (171 lb)   Height: 1.6 m (5' 3\")           Constitutional: WD/WN. Pleasant. In no acute distress.   Eyes: Swelling around R eye significantly improved since last visit. EOMI  HEENT: No oral ulcers or thrush. Normal salivary pool.  Neck: No cervical LAP, + thyromegaly  Chest: Clear to auscultation bilaterally  CV: RRR, no murmurs/ rubs or gallops. No edema, clubbing or cyanosis.   GI: Abdomen is soft and non tender.  MS: No synovitis or joint tenderness. Cool joints. No joint deformities. Full ROM of the joints. No nodules. +Fibromyalgia trigger points.  Skin: No livedo, periungual erythema, digital ulcers or nail changes. + alopecia with scales, facial malar erythema (looks like seborrhoic dermatitis).  Neuro: A&O x 3. Grossly non focal, muscular power 5/5 in all ext  Psych: NL affect and mood    Assessment/ plan:    1-Seropositive non-erosive RA (arthritis, AM stiffness, high CRP, RF 26 but re-check neg 3/2015 on HCQ) Dx 5/2013, FMS, new pleuritic CP 3/20-15-5/2015 resolved on steroids. GPA. She is on  mg bid since 5/2014. She tolerates it well and it's helping some but not enough to control all her pain. Continues having flare ups of joint pain, swelling also has FMS. Joint sx are getting worse. Had high ESR/CRP in 3/2015 and new pleuritic CP between 3/2015-5/2015 which resolved after taking medrol dose pack prescribed 5/20/2015. Her pleuritic CP could be related to her RA. Then stopped tramadol as it blames it for recent episodes of CP.    In the past, MTX was recommended, she declined.    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 Etiology of mass unclear, but it's inflammatory and related to pt's underlying autoimmune disease (inflammatory arthritis, serositis). 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    Her work up showed borderline + ALLI and slightly elevated MPO. I told her prednisone is not good for her diabetes and weight gain. Recommended a trial of MTX as next step. Discussed risks on details. I called her neuro-ophthalmologist at last visit who agreed with trial of MTX (she suspects pseudo-sarcoidosis or sarcoid like disease but no granuloma and ACE not elevated).     Unfortunately despite all my efforts to explain risks vs benefits (more than risks) of MTX as steroid sparing gent, Janine declined to try MTX. I was very concerned about her R orbital inflammatory mass as there is high chance of flare up off steroids and steroid causes her weigh gain and uncontrolled diabetes. I even offered her other steroid sparing gents like imuran. She declined that as well.    Her inflammation around R orbit has recurred now. On 4/7/2017, I spent quite amount of time discussing a trial of MTX. After discussing risks/benefits, she agreed to try it.     She is s/p Tx with MTX 10 mg po qwk 4/2017-5/2017. No benefits and more GI SEs, more hair loss and headaches since start of MTX. No benefits. I called and spoke with her neuro-ophthalmologist who recommended a second opinion from neuro-ophthalmology at the . I suspect her presentation to be ANCA vasculitis related but wanted to repeat imaging and get neuro-ophthalmology eval here. She was recommended to have repeat biopsy to r/o lymphoma.    In 5/2017, prescribed her prednisone 40-30-20-10 mg a day each for 3 days then stop (she declined higher  dose and longer taper despite my concern for worsening swelling around orbit), Her R campos-orbital edema significantly improved. MTX was stopped in 5/2017 given side effects. Plan was to start imuran 50 mg po qd (NL TPMT); however we decided to hold off till she gets biopsy done.    Repeat R lacrimal gland biopsy in 8/2017 showed non specific inflammation, it ruled out lymphoma. Her R orbital swelling is improved but still present. After her biopsy I contacted her to schedule a f/u visit with me to discuss plan of care but she declined till today's visit.    She did not tolerate AZA because of GI SEs.    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     This is definitely an inflammatory process and is steroid responsive, she had local kenalog inj in 8/2017 at the time of biopsy which has helped. Given her diabetes and long term SE of prednisone, highly recommend steroid sparing agent to prevent progression/recurrence of R campos-orbital swelling. Since she did not tolerate MTX and AZA, recommend rituximab as next step.     In 2/2018, I spent 30 minutes discussing test results, plan of care, rituximab SEs including rare risk of PML. She declined rituximab with concern for SEs.    Unfortunately lost f/u sine 2/2018. In 9/2018, was back with her R eye being much worse, swelling around R orbit was severe and is pushing down her eye. She decided to see an ophthalmologist at [facility], was seen 8/29, was told to have GPA.    Janine is frustrated with her eye condition, saying nobody told her that she has GPA or Wegener's which is a serious condition and people could die from it.    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 I put her on prednisone 30 mg every day in 9/2018, now she is off, stopped 6 wk after surgery. Does not like SEs including worsening BG.    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 Shah and Dr. Valdez at [place]. 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 Not happy with results, on my exam, her eye swelling/pain significantly improved. She wants to transfer care to here, I advised her to make an appointment with Dr. Saulo GREEN for f/u and referred her to Dr. Vasquez to assess if she has sinus involvement by GPA given facial pain.    After my original recommendation to receive rituximab (FDA approved for GPA) in 2/2018. She finally agreed to it, had 1 gr q2wk x 2 on 12/12/2018 and 12/26/2018. Had minor allergic reaction which was managed by extra dose of steroid and benadryl. She refuses to do prednisone taper given h/o diabetes, GIB on prednisone. I told her it would take 1 mo for rituximab to show benefit, if she continues to have active disease, recommend trial of cytoxan. Will check labs next week.      CT chest/abdomen/pelvis 4/2017 was neg for malignancy. 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 Repeat MPO was positive in 6/2017.    Continue accupuncture (referral was placed as it helps with chronic pain).     My impression is that her arthralgias are a combination of IA, fibromyalgia and chronic pain.  Stopped HCQ at last visit, shortly after resumed taking it as arthralgia recurred. Continue HCQ. Eye exam is updated.      2-Fad pads. She was seen by endocrinology and cushing was ruled out in 4/2014. Was advised to do f/u for enlarged thyroid/thyroid nodules.    3-Hair loss. F/U with dermatology    4-Chronic pain. F/U with pain clinic    5-Concerns of subclinical hyperthyroidism: TSH is barely minimal, chart review shows that those lowest levels are since April 2014. At last visit, discussed Endocrinology ref  which pt wants to hold off in this visit.     6-Vit D deficiency. Was replaced with vit D 50, 000 units po qwk x 12 wk then 2000 units qd      PLAN: Follow up 3/6 add on    MEDICATION CHANGES: none      No orders of the defined types were placed in this encounter.                          HPI      ROS      Physical Exam

## 2019-03-06 NOTE — PATIENT INSTRUCTIONS
Labs today (previous ordered labs, FSH and PCP's labs)    Sinus and chest CT    Schedule the rituximab for June 2019, call in May, [phone]-option 3, then option 2    Return in 3-4 months

## 2019-03-06 NOTE — LETTER
Patient:  Janine Cornell  :   1965  MRN:     [id number]        Ms.Lisa CHELLE Cornell  [address] [facility] [address] [facility] [address] [id number]        2019    Dear ,    We are writing to inform you of your test results. No sinusitis or sinus infection or sinus involvement by Wegener's. We already know about R orbital inflammation. Please schedule the rituximab for early  to help clearing up the inflammation in the orbit.      Results for orders placed or performed in visit on 19   CT Sinus w/o Contrast    Narrative    CT SINUS W/O CONTRAST 3/6/2019 3:00 PM    Provided History: sinusitis, acute, uncomplicated; plz eval for  sinusitis sec Wegener's; Wegener's vasculitis (H). Patient has history  of right orbital/lacrimal gland inflammation since . 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 There is a comparison from 3/27/2017 at [facility]     Technique:  Using thin collimation multidetector helical acquisition  technique, axial, coronal, and sagittal thin section CT images were  reconstructed through the paranasal sinuses. Images were reviewed in  bone and soft tissue windows.    Findings:     Large soft tissue lesion with fat stranding and soft tissue thickening  throughout the intraconal and extraconal spaces in the right orbit  causing proptosis of the right globe. There is preseptal soft tissue  thickening. The medial rectus muscle can be discretely seen but there  are no fat planes between the remaining orbital muscles, the right  lacrimal gland and the soft tissue lesion. This is greatly increased  since the remote comparison of 3/27/2017. There are comparisons from  2018 in the [facility] system that are not available.    Maxillary sinuses: clear.  Sphenoid sinus: clear.  Frontal sinus: clear.  Ethmoid air cells: clear.    The  ostiomeatal units appear patent bilaterally. The bony walls of the  paranasal sinuses are intact.    Normal retromaxillary and pterygopalatine fat.    The adenoid tonsils in the nasopharynx are unremarkable.      Impression    Impression:    1. Soft tissue lesion involving the intraconal and extra conal spaces  in the right orbit. The right lacrimal gland and all orbital muscles  except for the medial rectus are involved. Some preseptal soft tissue  thickening is also present. Lesion may be related to patient's  Christoph's vasculitis which can be associated with pseudotumor. Active  infection, sarcoidosis or lymphoma could also have this appearance.  Comparison to prior studies performed at [facility] may be helpful if  concern for clinical progression.  2. No evidence of sinusitis.     I have personally reviewed the examination and initial interpretation  and I agree with the findings.    FRANCOIS RICHARD MD     *Note: Due to a large number of results and/or encounters for the requested time period, some results have not been displayed. A complete set of results can be found in Results Review.       Majo Mckinnon MD

## 2019-03-06 NOTE — LETTER
Patient:  Janine Cornell  :   1965  MRN:     [id number]        Ms.Lisa CHELLE Cornell  [address] [facility] [id number]        2019    Dear ,    We are writing to inform you of your test results. Overall stable labs. A1C is elevated which suggest poor control of diabetes. It seems like the inflammation around eye got worse based on recent CT. This could mean that rituximab did not work very well. I will call you to discuss other treatment option (cytoxan).      Results for orders placed or performed in visit on 19   Vitamin D Deficiency   Result Value Ref Range    Vitamin D Deficiency screening 50 20 - 75 ug/L   Follicle stimulating hormone   Result Value Ref Range    FSH 14.3 IU/L   Proteinase 3 Antibody IgG   Result Value Ref Range    Proteinase 3 Antibody IgG <0.2 0.0 - 0.9 AI   Myeloperoxidase Antibody IgG   Result Value Ref Range    Myeloperoxidase Antibody IgG 1.4 (H) 0.0 - 0.9 AI   Hemoglobin A1c   Result Value Ref Range    Hemoglobin A1C 9.2 (H) 0 - 5.6 %   CD19 B Cell Count   Result Value Ref Range    CD19 B Cells <1 (L) 6 - 27 %    Absolute CD19 3 (L) 107 - 698 cells/uL   ANCA IgG by IFA with Reflex to Titer   Result Value Ref Range    Neutrophil Cytoplasmic Antibody <1:10 <1:10 [titer]    Neutrophil Cytoplasmic Antibody Pattern       The ANCA IFA is <1:10.  No further testing will be performed.   Creatinine random urine   Result Value Ref Range    Creatinine Urine Random 125 mg/dL   Protein  random urine with Creat Ratio   Result Value Ref Range    Protein Random Urine 0.13 g/L    Protein Total Urine g/gr Creatinine 0.10 0 - 0.2 g/g Cr   Routine UA with Micro Reflex to Culture   Result Value Ref Range    Color Urine Yellow     Appearance Urine Clear     Glucose Urine >499 (A) NEG^Negative mg/dL    Bilirubin Urine Negative NEG^Negative    Ketones Urine Negative NEG^Negative mg/dL    Specific Gravity Urine 1.014 1.003 - 1.035    Blood Urine Negative NEG^Negative    pH  Urine 5.0 5.0 - 7.0 pH    Protein Albumin Urine Negative NEG^Negative mg/dL    Urobilinogen mg/dL 0.0 0.0 - 2.0 mg/dL    Nitrite Urine Negative NEG^Negative    Leukocyte Esterase Urine Negative NEG^Negative    Source Midstream Urine     WBC Urine <1 0 - 5 /HPF    RBC Urine 1 0 - 2 /HPF    Squamous Epithelial /HPF Urine <1 0 - 1 /HPF    Mucous Urine Present (A) NEG^Negative /LPF   Erythrocyte sedimentation rate auto   Result Value Ref Range    Sed Rate 22 0 - 30 mm/h   CRP inflammation   Result Value Ref Range    CRP Inflammation 7.1 0.0 - 8.0 mg/L   Creatinine   Result Value Ref Range    Creatinine 0.99 0.52 - 1.04 mg/dL    GFR Estimate 65 >60 mL/min/[1.73_m2]    GFR Estimate If Black 75 >60 mL/min/[1.73_m2]   CBC with platelets differential   Result Value Ref Range    WBC 12.5 (H) 4.0 - 11.0 10e9/L    RBC Count 4.90 3.8 - 5.2 10e12/L    Hemoglobin 12.5 11.7 - 15.7 g/dL    Hematocrit 39.4 35.0 - 47.0 %    MCV 80 78 - 100 fl    MCH 25.5 (L) 26.5 - 33.0 pg    MCHC 31.7 31.5 - 36.5 g/dL    RDW 13.8 10.0 - 15.0 %    Platelet Count 351 150 - 450 10e9/L    Diff Method Automated Method     % Neutrophils 73.6 %    % Lymphocytes 16.7 %    % Monocytes 6.4 %    % Eosinophils 2.6 %    % Basophils 0.4 %    % Immature Granulocytes 0.3 %    Nucleated RBCs 0 0 /100    Absolute Neutrophil 9.2 (H) 1.6 - 8.3 10e9/L    Absolute Lymphocytes 2.1 0.8 - 5.3 10e9/L    Absolute Monocytes 0.8 0.0 - 1.3 10e9/L    Absolute Eosinophils 0.3 0.0 - 0.7 10e9/L    Absolute Basophils 0.1 0.0 - 0.2 10e9/L    Abs Immature Granulocytes 0.0 0 - 0.4 10e9/L    Absolute Nucleated RBC 0.0    Albumin level   Result Value Ref Range    Albumin 4.2 3.4 - 5.0 g/dL   ALT   Result Value Ref Range    ALT 27 0 - 50 U/L   AST   Result Value Ref Range    AST 17 0 - 45 U/L   TSH with free T4 reflex   Result Value Ref Range    TSH 0.25 (L) 0.40 - 4.00 mU/L   Creatinine urine calculation only   Result Value Ref Range    Creatinine Urine 125 mg/dL   T4 free   Result Value  Ref Range    T4 Free 1.00 0.76 - 1.46 ng/dL     *Note: Due to a large number of results and/or encounters for the requested time period, some results have not been displayed. A complete set of results can be found in Results Review.     Majo Mckinnon MD

## 2019-03-07 LAB
CD19 CELLS # BLD: 3 CELLS/UL (ref 107–698)
CD19 CELLS NFR BLD: <1 % (ref 6–27)
DEPRECATED CALCIDIOL+CALCIFEROL SERPL-MC: 50 UG/L (ref 20–75)
MYELOPEROXIDASE AB SER-ACNC: 1.4 AI (ref 0–0.9)
PROTEINASE3 IGG SER-ACNC: <0.2 AI (ref 0–0.9)

## 2019-03-07 NOTE — RESULT ENCOUNTER NOTE
No sinusitis or sinus infection or sinus involvement by Wegener's. We already know about R orbital inflammation. Please schedule the rituximab for early June to help clearing up the inflammation in the orbit.

## 2019-03-07 NOTE — RESULT ENCOUNTER NOTE
No evidence of Wegener's or pleural effusion (fluid around the lungs), please let me know if your chest pain does not get better.

## 2019-03-08 ENCOUNTER — TELEPHONE (OUTPATIENT)
Dept: RHEUMATOLOGY | Facility: CLINIC | Age: 54
End: 2019-03-08

## 2019-03-08 LAB
ANCA AB PATTERN SER IF-IMP: NORMAL
C-ANCA TITR SER IF: NORMAL {TITER}

## 2019-03-08 NOTE — TELEPHONE ENCOUNTER
We are writing to inform you of your test results. No sinusitis or sinus infection or sinus involvement by Wegener's. We already know about R orbital inflammation. Please schedule the rituximab for early June to help clearing up the inflammation in the orbit.    Called and updated pt with results and plan.  Pt understands. No further questions.    Desiree Godinez RN  [facility]

## 2019-03-18 NOTE — RESULT ENCOUNTER NOTE
Overall stable labs. A1C is elevated which suggest poor control of diabetes. It seems like the inflammation around eye got worse based on recent CT. This could mean that rituximab did not work very well. I will call you to discuss other treatment option (cytoxan).

## 2019-03-25 ENCOUNTER — ANCILLARY PROCEDURE (OUTPATIENT)
Dept: MAMMOGRAPHY | Facility: CLINIC | Age: 54
End: 2019-03-25
Attending: FAMILY MEDICINE
Payer: COMMERCIAL

## 2019-03-25 DIAGNOSIS — Z12.31 SCREENING MAMMOGRAM, ENCOUNTER FOR: ICD-10-CM

## 2019-04-22 NOTE — TELEPHONE ENCOUNTER
FUTURE VISIT INFORMATION      FUTURE VISIT INFORMATION:    Date: 05/06/19    Time: 4:15    Location:  [facility]  REFERRAL INFORMATION:    Referring provider:  Dr. Chahal    Referring providers clinic:   Rheumatology  Reason for visit/diagnosis  assess if sinus involvement by GPA given facial pain    RECORDS REQUESTED FROM:       Clinic name Comments Records Status Imaging Status    [facility] 03/06/19, Progress Note Epic    Procedures 08/25/14, Audiogram Epic    Imaging 03/06/19, CT Sinus  05/09/17, MR Head

## 2019-05-06 ENCOUNTER — OFFICE VISIT (OUTPATIENT)
Dept: OPHTHALMOLOGY | Facility: CLINIC | Age: 54
End: 2019-05-06
Attending: OPHTHALMOLOGY
Payer: COMMERCIAL

## 2019-05-06 ENCOUNTER — OFFICE VISIT (OUTPATIENT)
Dept: OTOLARYNGOLOGY | Facility: CLINIC | Age: 54
End: 2019-05-06
Attending: INTERNAL MEDICINE
Payer: COMMERCIAL

## 2019-05-06 ENCOUNTER — PRE VISIT (OUTPATIENT)
Dept: OTOLARYNGOLOGY | Facility: CLINIC | Age: 54
End: 2019-05-06

## 2019-05-06 VITALS
RESPIRATION RATE: 13 BRPM | HEART RATE: 79 BPM | SYSTOLIC BLOOD PRESSURE: 126 MMHG | DIASTOLIC BLOOD PRESSURE: 84 MMHG | BODY MASS INDEX: 31.47 KG/M2 | TEMPERATURE: 98.7 F | WEIGHT: 171 LBS | HEIGHT: 62 IN

## 2019-05-06 DIAGNOSIS — I77.82 ANCA-ASSOCIATED VASCULITIS (H): Primary | ICD-10-CM

## 2019-05-06 DIAGNOSIS — H53.40 VISUAL FIELD DEFECT: ICD-10-CM

## 2019-05-06 DIAGNOSIS — H53.143 PHOTOPHOBIA OF BOTH EYES: ICD-10-CM

## 2019-05-06 DIAGNOSIS — H05.10 IDIOPATHIC ORBITAL INFLAMMATORY SYNDROME: ICD-10-CM

## 2019-05-06 DIAGNOSIS — H53.40 VISUAL FIELD DEFECT: Primary | ICD-10-CM

## 2019-05-06 DIAGNOSIS — J30.9 ALLERGIC RHINITIS, UNSPECIFIED SEASONALITY, UNSPECIFIED TRIGGER: Primary | ICD-10-CM

## 2019-05-06 DIAGNOSIS — H53.143 VISUAL DISCOMFORT OF BOTH EYES: ICD-10-CM

## 2019-05-06 PROCEDURE — 92133 CPTRZD OPH DX IMG PST SGM ON: CPT | Mod: ZF | Performed by: OPHTHALMOLOGY

## 2019-05-06 PROCEDURE — G0463 HOSPITAL OUTPT CLINIC VISIT: HCPCS | Mod: ZF | Performed by: TECHNICIAN/TECHNOLOGIST

## 2019-05-06 PROCEDURE — 92083 EXTENDED VISUAL FIELD XM: CPT | Mod: ZF | Performed by: OPHTHALMOLOGY

## 2019-05-06 ASSESSMENT — REFRACTION_WEARINGRX
OD_CYLINDER: +1.00
SPECS_TYPE: PAL
OS_CYLINDER: +1.25
OS_SPHERE: -3.75
OD_AXIS: 140
OD_AXIS: 167
SPECS_TYPE: SVL
OS_AXIS: 010
OS_SPHERE: -3.75
OS_CYLINDER: +1.25
OD_SPHERE: -3.50
OD_CYLINDER: +1.25
OS_AXIS: 004
OD_SPHERE: -4.00

## 2019-05-06 ASSESSMENT — MIFFLIN-ST. JEOR: SCORE: 1335.9

## 2019-05-06 ASSESSMENT — EXTERNAL EXAM - LEFT EYE: OS_EXAM: NORMAL

## 2019-05-06 ASSESSMENT — VISUAL ACUITY
OS_PH_CC+: -1
CORRECTION_TYPE: GLASSES
OS_PH_CC: 20/20
OS_CC: 20/25
OD_PH_CC: 20/25
OD_CC: 20/40

## 2019-05-06 ASSESSMENT — REFRACTION_MANIFEST
OS_AXIS: 005
OS_SPHERE: -3.75
OD_AXIS: 165
OD_CYLINDER: +1.00
OD_SPHERE: -4.00
OS_CYLINDER: +1.25

## 2019-05-06 ASSESSMENT — CONF VISUAL FIELD
OD_NORMAL: 1
OS_NORMAL: 1
METHOD: COUNTING FINGERS

## 2019-05-06 ASSESSMENT — TONOMETRY
OS_IOP_MMHG: 17
OD_IOP_MMHG: 17
IOP_METHOD: ICARE

## 2019-05-06 ASSESSMENT — SLIT LAMP EXAM - LIDS: COMMENTS: NORMAL

## 2019-05-06 ASSESSMENT — EXTERNAL EXAM - RIGHT EYE: OD_EXAM: NORMAL

## 2019-05-06 NOTE — PATIENT INSTRUCTIONS
Thank You for choosing [facility]. You were seen in the ENT  Clinic today by Dr.Boyer Sky would like you to follow up as needed when ill     has recommended the followin.Use arm & hammer simply saline nose spray    If you have any questions or concerns after your appointment, please  Call [phone].

## 2019-05-06 NOTE — PROGRESS NOTES
Otolaryngology Adult Consultation    Patient: Janine Cornell   : 1965     Patient presents with:  Consult:   Chief Complaint   Patient presents with     Consult     wegener's vasculitis         Referring Provider: Majo Mckinnon   Consulting Physician:  Sangeetha Vasquez MD       Assessment/Plan: Patient with Anca associated vasculitis. Taking meds to control allergy symptoms, but may be taking too much, which is drying mucous membranes, thickening secretions. Discussed nasal hygiene and normal nasal physiology. She does not have evidence of vasculitis associated sinus disease. She will see me as needed and continue to work with allergies to optimize meds. Recommend dental evaluation.      HPI: Janine Cornell is a 53 year old female seen today in the Otol[facility]ology Clinic in consultation from Dr. Chahal  for a history of vasculitis and sinus issues.  Feels like constant allergies, constant blow nose, plugged ears, eye drainage, post nasal drip, ears pop. Ears feel wet. Feels better now, worse when she got recent CT.   Previous work up has been performed including CT which is normal with regards to sinuses - saw ophtho today for orbital abnormality.  She sees an allergist, takes meds year round, has increased meds to control symptoms. Takes singulair, nasacort, allegra, benadryl, eye drops.     Anterior neck tenderness, poor dental experience. Poor dentition.     Current Outpatient Medications on File Prior to Visit:  acetaminophen (TYLENOL) 325 MG tablet Take 1-2 tablets (325-650 mg) by mouth every 6 hours as needed for pain (headache)   albuterol (2.5 MG/3ML) 0.083% neb solution INL 1 VIAL VIA NEBULIZATION Q 4 TO 6 HOURS PRN   albuterol (PROAIR HFA, PROVENTIL HFA, VENTOLIN HFA) 108 (90 BASE) MCG/ACT inhaler Inhale 2 puffs into the lungs every 6 hours as needed for shortness of breath / dyspnea or wheezing   ASPIRIN LOW DOSE 81 MG EC tablet Take 1 tablet (81 mg) by mouth daily   BASAGLAR 100 UNIT/ML injection  Inject 40 Units Subcutaneous daily (Patient taking differently: Inject 48 Units Subcutaneous daily )   blood glucose monitoring (NO BRAND SPECIFIED) meter device kit Use to test blood sugar 4 X times daily or as directed. (Patient taking differently: 1 kit Use to test blood sugar 4 X times daily or as directed.)   blood glucose monitoring (NO BRAND SPECIFIED) test strip 1 strip by In Vitro route 4 times daily Test as directed. Please dispense three months and three months of refills. Thank you. (Patient taking differently: 1 strip by In Vitro route 4 times daily Test as directed. Please dispense three months and three months of refills. Thank you.)   calcium carbonate (TUMS) 500 MG chewable tablet Take 3-4 chew tab by mouth daily as needed.   clopidogrel (PLAVIX) 75 MG tablet Take 1 tablet (75 mg) by mouth daily   COMPRESSION STOCKINGS 2 each daily Apply one 10-15 mmHg compression stocking to each lower extgmierty during the day and remove before bedtime.   cyclobenzaprine (FLEXERIL) 10 MG tablet Take 1 tablet (10 mg) by mouth 2 times daily as needed for muscle spasms   cycloSPORINE (RESTASIS) 0.05 % ophthalmic emulsion Place 1 drop into the right eye every 12 hours   desonide (DESOWEN) 0.05 % cream Apply topically 2 times daily   dicyclomine (BENTYL) 20 MG tablet TAKE 1 TABLET(20 MG) BY MOUTH FOUR TIMES DAILY AS NEEDED   diphenhydrAMINE (BENADRYL) 25 MG capsule TK 1 TO 2 CS PO QHS   doxycycline hyclate (VIBRA-TABS) 100 MG tablet    EPIPEN 2-RIKY 0.3 MG/0.3ML injection INJECT 0.3 MG INTO THE MUSCLE PRF ANAPHYALAXIS   ferrous gluconate (FERGON) 324 (38 Fe) MG tablet Take 1 tablet (324 mg) by mouth 2 times daily (with meals)   fexofenadine (ALLEGRA) 180 MG tablet Take 1 tablet by mouth daily as needed.   fluocinolone (SYNALAR) 0.01 % solution Apply topically daily as needed   fluticasone-vilanterol (BREO ELLIPTA) 100-25 MCG/INH inhaler Inhale 1 puff into the lungs daily   folic acid (FOLVITE) 1 MG tablet Take 1 tablet  by mouth daily   hydroxychloroquine (PLAQUENIL) 200 MG tablet Take 2 tablets (400 mg) by mouth daily   insulin pen needle (BD MARCK U/F) 32G X 4 MM USE DAILY OR AS DIRECTED   ketoconazole (NIZORAL) 2 % shampoo Apply topically daily as needed   lifitegrast (XIIDRA) 5 % opthalmic solution Place 1 drop into both eyes 2 times daily   lisinopril-hydrochlorothiazide (PRINZIDE/ZESTORETIC) 20-25 MG tablet Take 1 tablet by mouth daily   Magnesium Oxide -Mg Supplement 250 MG TABS TK 1 T PO BID. REDUCE IF STOOLS LOOSEN   metFORMIN (GLUCOPHAGE-XR) 500 MG 24 hr tablet TAKE 2 TABLETS(1000 MG) BY MOUTH DAILY WITH DINNER   metoclopramide (REGLAN) 10 MG tablet Take 1 tablet (10 mg) by mouth 4 times daily (before meals and nightly)   metoprolol succinate ER (TOPROL-XL) 100 MG 24 hr tablet Take 1 tablet (100 mg) by mouth daily   metroNIDAZOLE (NORITATE) 1 % cream Apply topically daily   montelukast (SINGULAIR) 10 MG tablet Take 1 tablet (10 mg) by mouth At Bedtime   Multiple Vitamin (DAILY-GERALD) TABS Take 1 tablet by mouth daily   nitroGLYcerin (NITROSTAT) 0.4 MG sublingual tablet Place 1 tablet (0.4 mg) under the tongue every 5 minutes as needed for chest pain   nystatin (MYCOSTATIN) 080586 UNITS TABS tablet TK 2 TS PO BID   ondansetron (ZOFRAN-ODT) 8 MG ODT tab Take 1 tablet (8 mg) by mouth every 8 hours as needed for nausea   pantoprazole (PROTONIX) 40 MG EC tablet Take 1 tablet (40 mg) by mouth daily Pork free tablets.   pravastatin (PRAVACHOL) 40 MG tablet Take 1 tablet (40 mg) by mouth daily   ranitidine (ZANTAC) 150 MG tablet Take 1 tablet (150 mg) by mouth 2 times daily   spironolactone (ALDACTONE) 50 MG tablet Take 1 tablet (50 mg) by mouth daily . Take additional 0.5 tablets by mouth once daily as needed for weight gain.   sucralfate (CARAFATE) 1 GM tablet Take 1 tablet (1 g) by mouth 4 times daily   traMADol (ULTRAM) 50 MG tablet Take 1 tablet (50 mg) by mouth every 8 hours as needed for moderate pain   Triamcinolone  Acetonide (NASACORT ALLERGY 24HR NA)    vitamin D (ERGOCALCIFEROL) 53427 UNIT capsule Take 1 capsule (50,000 Units) by mouth every 7 days Need a Vitamin D level in 2 months. (Patient taking differently: Take 50,000 Units by mouth every 7 days Need a Vitamin D level in 2 months.)   [] order for DME 1 Device once for 1 dose Equipment being ordered: Wedge pillow     No current facility-administered medications on file prior to visit.        Allergies   Allergen Reactions     Amoxicillin-Pot Clavulanate      Augmentin      Unknown possible hives and edema     Azithromycin      Diatrizoate Other (See Comments)     Pt wants this listed because she is allergic to shellfish      Imitrex [Sumatriptan]      Severe face/neck/chest tightness and flushing side effects      Penicillins Hives     Unknown      Pork Allergy      Stomach pain, cramping, diarrhea, itching, nausea and headaches     Shellfish Allergy Hives and Swelling     Sulfa Drugs Hives and Swelling     Zithromax [Azithromycin Dihydrate] Swelling     Unknown        Past Medical History:   Diagnosis Date     Abnormal glandular Papanicolaou smear of cervix      Allergic rhinitis     Allergy, airborne subst     Arthritis      ASCVD (arteriosclerotic cardiovascular disease)      Chronic pain      Chronic pancreatitis (H)     idiopathic, Tx: PPI, antioxidants, pancreatic enzymes     Common migraine      Coronary artery disease      Costochondritis      Difficulty in walking(719.7)      Dyspnea on exertion      Ectasia, mammary duct     [facility], persistent nipple discharge     Elevated fasting glucose      Gastro-oesophageal reflux disease      Hernia      History of angina      Hyperlipidemia LDL goal < 100      Hypertension goal BP (blood pressure) < 140/90     Essential hypertension     Iron deficiency anemia      Ischemic cardiomyopathy      Menorrhagia      Migraine headaches      Mild persistent asthma      Neuritis optic 1997     subacute autoimmune retrobulbar neuritis, Dr. 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 Matias, neg w/u, Rx'd w/prednisone     Ventral hernia, unspecified, without mention of obstruction or gangrene 7/2012       Social History     Occupational History     Employer: NONE    Tobacco Use     Smoking status: Former Smoker     Packs/day: 0.20     Years: 1.00     Pack years: 0.20     Types: Cigarettes     Last attempt to quit: 2/1/2016     Years since quitting: 3.2     Smokeless tobacco: Never Used   Substance and Sexual Activity     Alcohol use: No     Alcohol/week: 0.0 oz     Drug use: No     Sexual activity: Not Currently        Review of Systems  [facility] ROS 5/6/2019   Constitutional Problems with sleep   Neurology Dizzy spells, Headache   Ears, Nose, Throat Ear pain, Ringing/noise in ears, Nasal congestion or drainage, Sore throat   Cardiopulmonary Cough, Breathing problems, Wheezing, Chest pain   Gastrointestinal/Genitourinary Heartburn/indigestion, Unexplained nausea/vomiting   Endocrine Heat or cold intolerance        14 point ROS neg other than the symptoms noted above.    Physical Exam:    General Assessment   The patient is alert, oriented and in no acute distress.   Head/Face/Scalp  Grossly normal.   Ears  Normal canals, auricles and tympanic membranes.   Nose  External nose is straight, skin is normal. Intranasal exam (anterior  rhinoscopy) reveals normal moist mucosa, turbinate tissue without edema, erythema or crusting.  Septum mainly in the midline.    Mouth  Oral cavity shows healthy mucosa with out ulceration, masses or other lesions involving the tongue, palate, buccal mucosa, floor of mouth or gingiva.  Dentition in good repair.  Oropharynx with normal tonsils, posterior wall and base of tongue.  Neck  No significant adenopathy, thyroid or salivary gland abnormality.     Imaging reviewed.

## 2019-05-06 NOTE — PROGRESS NOTES
Assessment & Plan     Janine Cornell is a 53 year old female with the following diagnoses:   1. ANCA-associated vasculitis (H)    2. Visual field defect    3. Idiopathic orbital inflammatory syndrome       Initially seen by Dr. Vernon 5/2017. History of right orbital/lacrimal gland inflammation since 2016. She has a positive pANCA and MPO and borderline Anti-nuclear antibody. Had biopsy done March of 2016 which showed inflammation and microabscesses, negative for malignancy. Had repeat biopsy + periorbital kenalog injection done with Dr. Holbrook 8/4/17: dense fibrosis with patchy inflammation. She was very unhappy with the surgery and how it made her eyelid droopy and half closed. She underwent right upper eyelid ptosis repair with Dr. Cyr on 11/2017.     Her right orbital inflammation has never went away. She is recently underwent another right orbital excision with Dr. Shah 9/26/18 - dense fibrosis with acute and chronic inflammation centered on small vessels with areas of microabscess formation and necrobiotic debris.     She has previously tried methotrexate but could not tolerate the side effects. She was previously on steroids. She states she had a bleeding ulcer.     She has started rituximab 12/2018 (managed by Dr. Mccoy). Currently not on steroids at this point.     She additional has sinus problems, right facial numbness/tingling. She has an appointment with Dr. Vasquez 5/2019 to assess sinus issues. Dr. Mckinnon has seen her recently who did not note any sinus involvement to suggest GPA. She feels that her left eye sees dimmer in dark conditions. She is also extremely light sensitive. It does hurt her to move the right eye. There is sometimes pain upon palpation of her right eye. She states that her right eyelids are swollen.     CT 7/2018:        IMPRESSION:  1. Large soft tissue lesion centered within the lateral intraconal and extraconal right orbit has significantly increased in size  compared to the CT dated 03/27/2017. The lesion is inseparable from the right lacrimal gland and rectus musculature. Mass effect includes medial bowing of the optic nerve sheath complex and pronounced proptosis of the right globe. Given provided history, findings may represent a progressive inflammatory etiology (pseudotumor). Other differential considerations, such as sarcoidosis or lymphoma, could also have this appearance.  2. No acute intracranial hemorrhage or intracranial mass effect.     On examination, her visual acuity is 20/25 right eye and 20/20 left eye. There was no APD. She is proptotic right eye. She has mild periorbital edema. There no erythema. Anterior segment examination unremarkable. Funduscopic examination shows an elevated gliotic nerve. The left optic nerve is normal. RNFL OCT normal both eyes. GTOP shows nonspecific defects right eye.     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 Her symptoms have improved since few years ago but still symptomatic. However, her CT orbits clearly showed worsening from 2017 to 2018 and 2019.  She recently started rituxan 12/2018.  We will see how she responds to this. Currently, her visual function looks worse in the right eye and her retinal nerve fiber layer is thinner than 2017.   She denies double vision.  After her next rituximab infusion, would ask Dr. Mckinnon to do another CT orbit.  If there is no substantial improvement, then would consider cyclophosphamide if Dr. Mckinnon agrees.      RTC 3 months sooner as needed for worsening symptoms.              Attending Physician Attestation:  Complete documentation of historical and exam elements from today's encounter can be found in the full encounter summary report (not reduplicated in this progress note).  I personally obtained the chief complaint(s) and history of present illness.  I confirmed and edited as necessary the review of systems, past medical/surgical history, family history, social  history, and examination findings as documented by others; and I examined the patient myself.  I personally reviewed the relevant tests, images, and reports as documented above.  I formulated and edited as necessary the assessment and plan and discussed the findings and management plan with the patient and family. - Jas Valencia M.D.  PGY-3, Ophthalmology

## 2019-05-06 NOTE — NURSING NOTE
"Chief Complaint   Patient presents with     Consult     wegener's vasculitis      Blood pressure 126/84, pulse 79, temperature 98.7  F (37.1  C), resp. rate 13, height 1.57 m (5' 1.81\"), weight 77.6 kg (171 lb), not currently breastfeeding.    Julio César Calderon LPN    "

## 2019-05-06 NOTE — LETTER
2019       RE: Janine Cornell  [address] [facility] [address]     Dear Colleague,    Thank you for referring your patient, Janine Cornell, to the [facility] EAR NOSE AND THROAT at [facility]. Please see a copy of my visit note below.    Otolaryngology Adult Consultation    Patient: Janine Cornell   : 1965     Patient presents with:  Consult:   Chief Complaint   Patient presents with     Consult     wegener's vasculitis         Referring Provider: Majo Mckinnon   Consulting Physician:  Sangeetha Vasquez MD       Assessment/Plan: Patient with Anca associated vasculitis. Taking meds to control allergy symptoms, but may be taking too much, which is drying mucous membranes, thickening secretions. Discussed nasal hygiene and normal nasal physiology. She does not have evidence of vasculitis associated sinus disease. She will see me as needed and continue to work with allergies to optimize meds. Recommend dental evaluation.      HPI: Janine Cornell is a 53 year old female seen today in the Otolaryngology Clinic in consultation from Dr. Chahal  for a history of vasculitis and sinus issues.  Feels like constant allergies, constant blow nose, plugged ears, eye drainage, post nasal drip, ears pop. Ears feel wet. Feels better now, worse when she got recent CT.   Previous work up has been performed including CT which is normal with regards to sinuses - saw ophtho today for orbital abnormality.  She sees an allergist, takes meds year round, has increased meds to control symptoms. Takes singulair, nasacort, allegra, benadryl, eye drops.     Anterior neck tenderness, poor dental experience. Poor dentition.     Current Outpatient Medications on File Prior to Visit:  acetaminophen (TYLENOL) 325 MG tablet Take 1-2 tablets (325-650 mg) by mouth every 6 hours as needed for pain (headache)   albuterol (2.5 MG/3ML) 0.083% neb solution INL 1 VIAL VIA NEBULIZATION Q 4 TO 6 HOURS PRN    albuterol (PROAIR HFA, PROVENTIL HFA, VENTOLIN HFA) 108 (90 BASE) MCG/ACT inhaler Inhale 2 puffs into the lungs every 6 hours as needed for shortness of breath / dyspnea or wheezing   ASPIRIN LOW DOSE 81 MG EC tablet Take 1 tablet (81 mg) by mouth daily   BASAGLAR 100 UNIT/ML injection Inject 40 Units Subcutaneous daily (Patient taking differently: Inject 48 Units Subcutaneous daily )   blood glucose monitoring (NO BRAND SPECIFIED) meter device kit Use to test blood sugar 4 X times daily or as directed. (Patient taking differently: 1 kit Use to test blood sugar 4 X times daily or as directed.)   blood glucose monitoring (NO BRAND SPECIFIED) test strip 1 strip by In Vitro route 4 times daily Test as directed. Please dispense three months and three months of refills. Thank you. (Patient taking differently: 1 strip by In Vitro route 4 times daily Test as directed. Please dispense three months and three months of refills. Thank you.)   calcium carbonate (TUMS) 500 MG chewable tablet Take 3-4 chew tab by mouth daily as needed.   clopidogrel (PLAVIX) 75 MG tablet Take 1 tablet (75 mg) by mouth daily   COMPRESSION STOCKINGS 2 each daily Apply one 10-15 mmHg compression stocking to each lower extgmierty during the day and remove before bedtime.   cyclobenzaprine (FLEXERIL) 10 MG tablet Take 1 tablet (10 mg) by mouth 2 times daily as needed for muscle spasms   cycloSPORINE (RESTASIS) 0.05 % ophthalmic emulsion Place 1 drop into the right eye every 12 hours   desonide (DESOWEN) 0.05 % cream Apply topically 2 times daily   dicyclomine (BENTYL) 20 MG tablet TAKE 1 TABLET(20 MG) BY MOUTH FOUR TIMES DAILY AS NEEDED   diphenhydrAMINE (BENADRYL) 25 MG capsule TK 1 TO 2 CS PO QHS   doxycycline hyclate (VIBRA-TABS) 100 MG tablet    EPIPEN 2-RIKY 0.3 MG/0.3ML injection INJECT 0.3 MG INTO THE MUSCLE PRF ANAPHYALAXIS   ferrous gluconate (FERGON) 324 (38 Fe) MG tablet Take 1 tablet (324 mg) by mouth 2 times daily (with meals)    fexofenadine (ALLEGRA) 180 MG tablet Take 1 tablet by mouth daily as needed.   fluocinolone (SYNALAR) 0.01 % solution Apply topically daily as needed   fluticasone-vilanterol (BREO ELLIPTA) 100-25 MCG/INH inhaler Inhale 1 puff into the lungs daily   folic acid (FOLVITE) 1 MG tablet Take 1 tablet by mouth daily   hydroxychloroquine (PLAQUENIL) 200 MG tablet Take 2 tablets (400 mg) by mouth daily   insulin pen needle (BD MARCK U/F) 32G X 4 MM USE DAILY OR AS DIRECTED   ketoconazole (NIZORAL) 2 % shampoo Apply topically daily as needed   lifitegrast (XIIDRA) 5 % opthalmic solution Place 1 drop into both eyes 2 times daily   lisinopril-hydrochlorothiazide (PRINZIDE/ZESTORETIC) 20-25 MG tablet Take 1 tablet by mouth daily   Magnesium Oxide -Mg Supplement 250 MG TABS TK 1 T PO BID. REDUCE IF STOOLS LOOSEN   metFORMIN (GLUCOPHAGE-XR) 500 MG 24 hr tablet TAKE 2 TABLETS(1000 MG) BY MOUTH DAILY WITH DINNER   metoclopramide (REGLAN) 10 MG tablet Take 1 tablet (10 mg) by mouth 4 times daily (before meals and nightly)   metoprolol succinate ER (TOPROL-XL) 100 MG 24 hr tablet Take 1 tablet (100 mg) by mouth daily   metroNIDAZOLE (NORITATE) 1 % cream Apply topically daily   montelukast (SINGULAIR) 10 MG tablet Take 1 tablet (10 mg) by mouth At Bedtime   Multiple Vitamin (DAILY-GERALD) TABS Take 1 tablet by mouth daily   nitroGLYcerin (NITROSTAT) 0.4 MG sublingual tablet Place 1 tablet (0.4 mg) under the tongue every 5 minutes as needed for chest pain   nystatin (MYCOSTATIN) 435972 UNITS TABS tablet TK 2 TS PO BID   ondansetron (ZOFRAN-ODT) 8 MG ODT tab Take 1 tablet (8 mg) by mouth every 8 hours as needed for nausea   pantoprazole (PROTONIX) 40 MG EC tablet Take 1 tablet (40 mg) by mouth daily Pork free tablets.   pravastatin (PRAVACHOL) 40 MG tablet Take 1 tablet (40 mg) by mouth daily   ranitidine (ZANTAC) 150 MG tablet Take 1 tablet (150 mg) by mouth 2 times daily   spironolactone (ALDACTONE) 50 MG tablet Take 1 tablet (50 mg)  by mouth daily . Take additional 0.5 tablets by mouth once daily as needed for weight gain.   sucralfate (CARAFATE) 1 GM tablet Take 1 tablet (1 g) by mouth 4 times daily   traMADol (ULTRAM) 50 MG tablet Take 1 tablet (50 mg) by mouth every 8 hours as needed for moderate pain   Triamcinolone Acetonide (NASACORT ALLERGY 24HR NA)    vitamin D (ERGOCALCIFEROL) 26828 UNIT capsule Take 1 capsule (50,000 Units) by mouth every 7 days Need a Vitamin D level in 2 months. (Patient taking differently: Take 50,000 Units by mouth every 7 days Need a Vitamin D level in 2 months.)   [] order for DME 1 Device once for 1 dose Equipment being ordered: Wedge pillow     No current facility-administered medications on file prior to visit.        Allergies   Allergen Reactions     Amoxicillin-Pot Clavulanate      Augmentin      Unknown possible hives and edema     Azithromycin      Diatrizoate Other (See Comments)     Pt wants this listed because she is allergic to shellfish      Imitrex [Sumatriptan]      Severe face/neck/chest tightness and flushing side effects      Penicillins Hives     Unknown      Pork Allergy      Stomach pain, cramping, diarrhea, itching, nausea and headaches     Shellfish Allergy Hives and Swelling     Sulfa Drugs Hives and Swelling     Zithromax [Azithromycin Dihydrate] Swelling     Unknown        Past Medical History:   Diagnosis Date     Abnormal glandular Papanicolaou smear of cervix      Allergic rhinitis     Allergy, airborne subst     Arthritis      ASCVD (arteriosclerotic cardiovascular disease)      Chronic pain      Chronic pancreatitis (H)     idiopathic, Tx: PPI, antioxidants, pancreatic enzymes     Common migraine      Coronary artery disease      Costochondritis      Difficulty in walking(719.7)      Dyspnea on exertion      Ectasia, mammary duct     [facility], persistent nipple discharge     Elevated fasting glucose      Gastro-oesophageal reflux disease      Hernia       History of angina      Hyperlipidemia LDL goal < 100      Hypertension goal BP (blood pressure) < 140/90     Essential hypertension     Iron deficiency anemia      Ischemic cardiomyopathy      Menorrhagia      Migraine headaches      Mild persistent asthma      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 Matias, neg w/u, Rx'd w/prednisone     Ventral hernia, unspecified, without mention of obstruction or gangrene 7/2012       Social History     Occupational History     Employer: NONE    Tobacco Use     Smoking status: Former Smoker     Packs/day: 0.20     Years: 1.00     Pack years: 0.20     Types: Cigarettes     Last attempt to quit: 2/1/2016     Years since quitting: 3.2     Smokeless tobacco: Never Used   Substance and Sexual Activity     Alcohol use: No     Alcohol/week: 0.0 oz     Drug use: No     Sexual activity: Not Currently        Review of Systems  [facility] ROS 5/6/2019   Constitutional Problems with sleep   Neurology Dizzy spells, Headache   Ears, Nose, Throat Ear pain, Ringing/noise in ears, Nasal congestion or drainage, Sore throat   Cardiopulmonary Cough, Breathing problems, Wheezing, Chest pain   Gastrointestinal/Genitourinary Heartburn/indigestion, Unexplained nausea/vomiting   Endocrine Heat or cold intolerance        14 point ROS neg other than the symptoms  noted above.    Physical Exam:    General Assessment   The patient is alert, oriented and in no acute distress.   Head/Face/Scalp  Grossly normal.   Ears  Normal canals, auricles and tympanic membranes.   Nose  External nose is straight, skin is normal. Intranasal exam (anterior rhinoscopy) reveals normal moist mucosa, turbinate tissue without edema, erythema or crusting.  Septum mainly in the midline.    Mouth  Oral cavity shows healthy mucosa with out ulceration, masses or other lesions involving the tongue, palate, buccal mucosa, floor of mouth or gingiva.  Dentition in good repair.  Oropharynx with normal tonsils, posterior wall and base of tongue.  Neck  No significant adenopathy, thyroid or salivary gland abnormality.     Imaging reviewed.       Again, thank you for allowing me to participate in the care of your patient.      Sincerely,    Sangeetha Vasquez MD

## 2019-05-08 ENCOUNTER — TELEPHONE (OUTPATIENT)
Dept: OPHTHALMOLOGY | Facility: CLINIC | Age: 54
End: 2019-05-08

## 2019-05-10 DIAGNOSIS — M19.90 INFLAMMATORY ARTHRITIS: ICD-10-CM

## 2019-05-12 RX ORDER — FOLIC ACID 1 MG/1
TABLET ORAL
Qty: 90 TABLET | Refills: 3 | Status: SHIPPED | OUTPATIENT
Start: 2019-05-12 | End: 2021-05-10

## 2019-05-20 DIAGNOSIS — M79.7 FIBROMYALGIA: ICD-10-CM

## 2019-05-20 DIAGNOSIS — M35.9 UNDIFFERENTIATED CONNECTIVE TISSUE DISEASE (H): ICD-10-CM

## 2019-05-20 RX ORDER — CYCLOBENZAPRINE HCL 10 MG
10 TABLET ORAL 2 TIMES DAILY PRN
Qty: 60 TABLET | Refills: 2 | Status: SHIPPED | OUTPATIENT
Start: 2019-05-20 | End: 2021-04-21

## 2019-05-20 NOTE — TELEPHONE ENCOUNTER
Last Seen: 3/6/2019  Next Appointment: 7/12/2019  Medication: tramadol 50mg  Last Filled: 2/28/2019  Qty: #60     reviewed as follows:    02/28/2019 2 10/28/2018 Tramadol Hcl 50 Mg Tablet   60 20 [place] [id number] [facility] ([id number] 3 15.00 [place]  01/31/2019 2 10/28/2018 Tramadol Hcl 50 Mg Tablet   60 20 Pa [place] [id number] [facility] ([id number]) 2 15.00 [place]  12/24/2018 2 10/28/2018 Tramadol Hcl 50 Mg Tablet   60 20 [place] [id number] [facility] ([id number] 1 15.00 [place]  10/30/2018 1 10/30/2018 Oxycodone Hcl 5 Mg Tablet   5 2 Pa [facility] [id number] [facility] ([id number][facility] 0 18.75 [place]  10/29/2018 2 10/28/2018 Tramadol Hcl 50 Mg Tablet   60 20 Pa [place] [id number] [facility] ([facility] 0 15.00 [place]  Request sent to provider for review and signature.    Beulah Fortune [facility]  5/20/2019 3:29 PM

## 2019-05-20 NOTE — TELEPHONE ENCOUNTER
cyclobenzaprine (FLEXERIL) 10 MG tablet      Last Written Prescription Date:  3/6/19  Last Fill Quantity: 180,   # refills: 0  Last Office Visit : 3/6/19  Future Office visit:  7/12/19    Routing refill request to provider for review/approval because:  Medication not on the Rheumatology refill protocol.

## 2019-05-22 RX ORDER — TRAMADOL HYDROCHLORIDE 50 MG/1
50 TABLET ORAL EVERY 8 HOURS PRN
Qty: 60 TABLET | Refills: 3 | Status: SHIPPED | OUTPATIENT
Start: 2019-05-22 | End: 2019-10-29

## 2019-05-22 NOTE — TELEPHONE ENCOUNTER
Signed tramadol script is ready for the patient and has been faxed to the [facility]'s pharmacy on [address] in [place]. Patient notified via [place] message.   Beulah Fortune CMA  5/22/2019 3:05 PM

## 2019-05-31 ENCOUNTER — TELEPHONE (OUTPATIENT)
Dept: RHEUMATOLOGY | Facility: CLINIC | Age: 54
End: 2019-05-31

## 2019-05-31 NOTE — TELEPHONE ENCOUNTER
Pt called to ask if there are any special instructions for her Rituximab infusion. Nothing needed, did advise pt that she will be getting a dose of steroids and should pay attention to her blood sugars. Pt understands, no further questions.    Desiree Godinez RN  Rheumatology Clinic

## 2019-06-03 ENCOUNTER — INFUSION THERAPY VISIT (OUTPATIENT)
Dept: INFUSION THERAPY | Facility: CLINIC | Age: 54
End: 2019-06-03
Attending: INTERNAL MEDICINE
Payer: COMMERCIAL

## 2019-06-03 VITALS
SYSTOLIC BLOOD PRESSURE: 122 MMHG | OXYGEN SATURATION: 98 % | DIASTOLIC BLOOD PRESSURE: 65 MMHG | WEIGHT: 169.2 LBS | HEART RATE: 105 BPM | TEMPERATURE: 98.5 F | BODY MASS INDEX: 31.14 KG/M2 | RESPIRATION RATE: 18 BRPM

## 2019-06-03 DIAGNOSIS — M31.30 WEGENER'S VASCULITIS: Primary | ICD-10-CM

## 2019-06-03 DIAGNOSIS — I77.82 ANCA-ASSOCIATED VASCULITIS (H): ICD-10-CM

## 2019-06-03 PROCEDURE — 96413 CHEMO IV INFUSION 1 HR: CPT

## 2019-06-03 PROCEDURE — 25000128 H RX IP 250 OP 636: Performed by: INTERNAL MEDICINE

## 2019-06-03 PROCEDURE — 96376 TX/PRO/DX INJ SAME DRUG ADON: CPT

## 2019-06-03 PROCEDURE — 25000132 ZZH RX MED GY IP 250 OP 250 PS 637: Performed by: INTERNAL MEDICINE

## 2019-06-03 PROCEDURE — 96375 TX/PRO/DX INJ NEW DRUG ADDON: CPT

## 2019-06-03 PROCEDURE — 96415 CHEMO IV INFUSION ADDL HR: CPT

## 2019-06-03 PROCEDURE — 25800030 ZZH RX IP 258 OP 636: Performed by: INTERNAL MEDICINE

## 2019-06-03 RX ORDER — METHYLPREDNISOLONE SODIUM SUCCINATE 125 MG/2ML
125 INJECTION, POWDER, LYOPHILIZED, FOR SOLUTION INTRAMUSCULAR; INTRAVENOUS ONCE
Status: COMPLETED | OUTPATIENT
Start: 2019-06-03 | End: 2019-06-03

## 2019-06-03 RX ORDER — METHYLPREDNISOLONE SODIUM SUCCINATE 125 MG/2ML
125 INJECTION, POWDER, LYOPHILIZED, FOR SOLUTION INTRAMUSCULAR; INTRAVENOUS
Status: DISCONTINUED | OUTPATIENT
Start: 2019-06-03 | End: 2019-06-03 | Stop reason: HOSPADM

## 2019-06-03 RX ORDER — ACETAMINOPHEN 325 MG/1
650 TABLET ORAL ONCE
Status: CANCELLED
Start: 2019-06-17

## 2019-06-03 RX ORDER — ALBUTEROL SULFATE 90 UG/1
2 AEROSOL, METERED RESPIRATORY (INHALATION)
Status: DISCONTINUED | OUTPATIENT
Start: 2019-06-03 | End: 2019-06-03 | Stop reason: HOSPADM

## 2019-06-03 RX ORDER — ALBUTEROL SULFATE 0.83 MG/ML
2.5 SOLUTION RESPIRATORY (INHALATION)
Status: DISCONTINUED | OUTPATIENT
Start: 2019-06-03 | End: 2019-06-03 | Stop reason: HOSPADM

## 2019-06-03 RX ORDER — METHYLPREDNISOLONE SODIUM SUCCINATE 125 MG/2ML
125 INJECTION, POWDER, LYOPHILIZED, FOR SOLUTION INTRAMUSCULAR; INTRAVENOUS ONCE
Status: CANCELLED | OUTPATIENT
Start: 2019-06-17

## 2019-06-03 RX ORDER — DIPHENHYDRAMINE HYDROCHLORIDE 50 MG/ML
50 INJECTION INTRAMUSCULAR; INTRAVENOUS
Status: COMPLETED | OUTPATIENT
Start: 2019-06-03 | End: 2019-06-03

## 2019-06-03 RX ORDER — ACETAMINOPHEN 325 MG/1
650 TABLET ORAL ONCE
Status: COMPLETED | OUTPATIENT
Start: 2019-06-03 | End: 2019-06-03

## 2019-06-03 RX ORDER — SODIUM CHLORIDE 9 MG/ML
INJECTION, SOLUTION INTRAVENOUS CONTINUOUS PRN
Status: DISCONTINUED | OUTPATIENT
Start: 2019-06-03 | End: 2019-06-03 | Stop reason: HOSPADM

## 2019-06-03 RX ADMIN — ACETAMINOPHEN 650 MG: 325 TABLET, FILM COATED ORAL at 08:51

## 2019-06-03 RX ADMIN — SODIUM CHLORIDE 250 ML: 9 INJECTION, SOLUTION INTRAVENOUS at 08:55

## 2019-06-03 RX ADMIN — DIPHENHYDRAMINE HYDROCHLORIDE 50 MG: 50 INJECTION INTRAMUSCULAR; INTRAVENOUS at 09:03

## 2019-06-03 RX ADMIN — RITUXIMAB 1000 MG: 10 INJECTION, SOLUTION INTRAVENOUS at 09:38

## 2019-06-03 RX ADMIN — HYDROCORTISONE SODIUM SUCCINATE 100 MG: 100 INJECTION, POWDER, FOR SOLUTION INTRAMUSCULAR; INTRAVENOUS at 14:16

## 2019-06-03 RX ADMIN — HYDROCORTISONE SODIUM SUCCINATE 100 MG: 100 INJECTION, POWDER, FOR SOLUTION INTRAMUSCULAR; INTRAVENOUS at 15:36

## 2019-06-03 RX ADMIN — METHYLPREDNISOLONE SODIUM SUCCINATE 125 MG: 125 INJECTION, POWDER, FOR SOLUTION INTRAMUSCULAR; INTRAVENOUS at 08:57

## 2019-06-03 RX ADMIN — DIPHENHYDRAMINE HYDROCHLORIDE 50 MG: 50 INJECTION INTRAMUSCULAR; INTRAVENOUS at 14:57

## 2019-06-03 ASSESSMENT — PAIN SCALES - GENERAL: PAINLEVEL: SEVERE PAIN (6)

## 2019-06-03 NOTE — PROGRESS NOTES
Infusion Nursing Note:  Janine Cornell presents today for rituxan.    Patient seen by provider today: No   present during visit today: Not Applicable.    Note: Patient states she hasn't been feeling that good.  She feels like she has some facial swelling, along with swelling in her arms and legs.  She also states she has numbness and tingling in her fingers and toes, worse early morning.    Intravenous Access:  Peripheral IV placed.    Treatment Conditions:  Biological Infusion Checklist:  ~~~ NOTE: If the patient answers yes to any of the questions below, hold the infusion and contact ordering provider or on-call provider.    1. Have you recently had an elevated temperature, fever, chills, productive cough, coughing for 3 weeks or longer or hemoptysis, abnormal vital signs, night sweats,  chest pain or have you noticed a decrease in your appetite, unexplained weight loss or fatigue? No  2. Do you have any open wounds or new incisions? No  3. Do you have any recent or upcoming hospitalizations, surgeries or dental procedures? No  4. Do you currently have or recently have had any signs of illness or infection or are you on any antibiotics? No  5. Have you had any new, sudden or worsening abdominal pain? No  6. Have you or anyone in your household received a live vaccination in the past 4 weeks? Please note:  No live vaccines while on biologic/chemotherapy until 6 months after the last treatment.  Patient can receive the flu vaccine (shot only) and the pneumovax.  It is optimal for the patient to get these vaccines mid cycle, but they can be given at any time as long as it is not on the day of the infusion. No  7. Have you recently been diagnosed with any new nervous system diseases (ie. Multiple sclerosis, Guillain [place], seizures, neurological changes) or cancer diagnosis? No  8. Are you on any form of radiation or chemotherapy? No  9. Are you pregnant or breast feeding or do you have plans of pregnancy in  "the future? No  10. Have you been having any signs of worsening depression or suicidal ideations?  (benlysta only) No  11. Have there been any other new onset medical symptoms? No        Post Infusion Assessment:  Patient tolerated infusion poorly due to : Rituxan titrated per protocol, to rate of 300mg/hr.  Around 1345 patient states she is experiencing some chest tightness and she felt like her hand were becoming tight and swollen.  (She did use her inhaler about an hour earlier).  Rituxan stopped and 50mg IV benadryl given.  Dr. Mckinnon notified and 100mg solucortef ordered and given.  30 minutes following, patient states chest tightness is resolved.  Rituxan restarted at 150mg/hr, after 30 minutes increased to 200mg/hr.  About 5 minutes after dosed increase, patient complaining of \"scratchy throat\".  Rituxan stopped, about 50cc left.  Dr. Mckinnon notified, 100mg IV solucortef given.  Scratchy throat resolving after solucortef. Will not restart rituxan.  Per Dr. Mckinnon, will need to titrate rituxan at a much slower rate.  Dr. Mckinnon will have her nurse contact patient and schedule at [facility] as their hours are extended.    Patient observed for 45 minutes post rituxan per protocol.  Patient states she is feeling better, denies any scratchy throat at this time.  Blood return noted pre and post infusion.  Site patent and intact, free from redness, edema or discomfort.  No evidence of extravasations.  Access discontinued per protocol.       Discharge Plan:   Discharge instructions reviewed with: Patient including when to get medical help.  Copy of AVS reviewed with patient and/or family.  Patient will return in 2 weeks for next appointment.  Patient discharged in stable condition accompanied by: self, has transportation home.  Departure Mode: Ambulatory.    Nahed Yang RN                        "

## 2019-06-04 ENCOUNTER — TELEPHONE (OUTPATIENT)
Dept: RHEUMATOLOGY | Facility: CLINIC | Age: 54
End: 2019-06-04

## 2019-06-04 NOTE — TELEPHONE ENCOUNTER
Dr. Mckinnon is fine with pt staying at [facility].      Left message requesting pt return call to me to discuss.    Desiree Godinez RN  Rheumatology Clinic

## 2019-06-04 NOTE — TELEPHONE ENCOUNTER
Called and spoke with pt, she would prefer to stay at the [facility] infusion center.  I will call and discuss with [facility] infusion center. Pt is feeling fine today, just has some arm pain.       Called to infusion center, spoke with patient she would prefer to have the infusion at the [facility], they are willing to accommodate the infusion.      I have updated infusion orders to reflect a slower rate of infusion.     Will send to provider to sign.    Desiree Godniez RN  Rheumatology Clinic

## 2019-06-04 NOTE — TELEPHONE ENCOUNTER
----- Message from Majo Mckinnon MD sent at 6/3/2019  3:51 PM CDT -----  Regarding: infusion reaction  Had a reaction when they increased the rate of rituximab infusion, advise her 2nd dose to be given ONLY at [facility] with slow rate, please see notes from today

## 2019-06-05 NOTE — TELEPHONE ENCOUNTER
Called and updated pt that the infusion rate has been slowed down and her subsequent infusions will remain at a slow rate.  Discussed medications to be given before infusion.  Pt understands, reports feeling very bloated and puffy after last infusion.  Discussed extra doses of steroids given and the puffiness from the infusion.  She will let us know if it happens after her next infusion.    Desiree Godinez RN  Rheumatology Clinic

## 2019-06-17 ENCOUNTER — INFUSION THERAPY VISIT (OUTPATIENT)
Dept: INFUSION THERAPY | Facility: CLINIC | Age: 54
End: 2019-06-17
Attending: INTERNAL MEDICINE
Payer: COMMERCIAL

## 2019-06-17 VITALS
RESPIRATION RATE: 16 BRPM | TEMPERATURE: 98.6 F | HEART RATE: 88 BPM | DIASTOLIC BLOOD PRESSURE: 62 MMHG | SYSTOLIC BLOOD PRESSURE: 116 MMHG | BODY MASS INDEX: 31.54 KG/M2 | WEIGHT: 171.4 LBS | OXYGEN SATURATION: 97 %

## 2019-06-17 DIAGNOSIS — M31.30 WEGENER'S VASCULITIS: Primary | ICD-10-CM

## 2019-06-17 DIAGNOSIS — I77.82 ANCA-ASSOCIATED VASCULITIS (H): ICD-10-CM

## 2019-06-17 PROCEDURE — 96415 CHEMO IV INFUSION ADDL HR: CPT

## 2019-06-17 PROCEDURE — 25800030 ZZH RX IP 258 OP 636: Performed by: INTERNAL MEDICINE

## 2019-06-17 PROCEDURE — 25000128 H RX IP 250 OP 636: Performed by: INTERNAL MEDICINE

## 2019-06-17 PROCEDURE — 96375 TX/PRO/DX INJ NEW DRUG ADDON: CPT

## 2019-06-17 PROCEDURE — 25000132 ZZH RX MED GY IP 250 OP 250 PS 637: Performed by: INTERNAL MEDICINE

## 2019-06-17 PROCEDURE — 96367 TX/PROPH/DG ADDL SEQ IV INF: CPT

## 2019-06-17 PROCEDURE — 96413 CHEMO IV INFUSION 1 HR: CPT

## 2019-06-17 RX ORDER — ACETAMINOPHEN 325 MG/1
650 TABLET ORAL ONCE
Status: CANCELLED
Start: 2019-06-17

## 2019-06-17 RX ORDER — METHYLPREDNISOLONE SODIUM SUCCINATE 125 MG/2ML
125 INJECTION, POWDER, LYOPHILIZED, FOR SOLUTION INTRAMUSCULAR; INTRAVENOUS ONCE
Status: CANCELLED | OUTPATIENT
Start: 2019-06-17

## 2019-06-17 RX ORDER — ACETAMINOPHEN 325 MG/1
650 TABLET ORAL ONCE
Status: COMPLETED | OUTPATIENT
Start: 2019-06-17 | End: 2019-06-17

## 2019-06-17 RX ORDER — DIPHENHYDRAMINE HYDROCHLORIDE 50 MG/ML
50 INJECTION INTRAMUSCULAR; INTRAVENOUS ONCE
Status: DISCONTINUED | OUTPATIENT
Start: 2019-06-17 | End: 2019-06-17 | Stop reason: HOSPADM

## 2019-06-17 RX ORDER — METHYLPREDNISOLONE SODIUM SUCCINATE 125 MG/2ML
125 INJECTION, POWDER, LYOPHILIZED, FOR SOLUTION INTRAMUSCULAR; INTRAVENOUS ONCE
Status: COMPLETED | OUTPATIENT
Start: 2019-06-17 | End: 2019-06-17

## 2019-06-17 RX ADMIN — ACETAMINOPHEN 650 MG: 325 TABLET, FILM COATED ORAL at 08:46

## 2019-06-17 RX ADMIN — RITUXIMAB 1000 MG: 10 INJECTION, SOLUTION INTRAVENOUS at 09:31

## 2019-06-17 RX ADMIN — METHYLPREDNISOLONE SODIUM SUCCINATE 125 MG: 125 INJECTION, POWDER, FOR SOLUTION INTRAMUSCULAR; INTRAVENOUS at 08:56

## 2019-06-17 RX ADMIN — SODIUM CHLORIDE 250 ML: 9 INJECTION, SOLUTION INTRAVENOUS at 09:00

## 2019-06-17 RX ADMIN — DIPHENHYDRAMINE HYDROCHLORIDE 50 MG: 50 INJECTION INTRAMUSCULAR; INTRAVENOUS at 09:01

## 2019-06-17 ASSESSMENT — PAIN SCALES - GENERAL: PAINLEVEL: MILD PAIN (2)

## 2019-06-17 NOTE — PROGRESS NOTES
Infusion Nursing Note:  Janine Cornell presents today for rituxan.    Patient seen by provider today: No   present during visit today: Not Applicable.    Note: patient states she continues to feel fullness and bloating off and on.  Originally she had feeling of swelling in right arm, but now she also experiences in the left arm.  Slight headache this morning.    Intravenous Access:  Peripheral IV placed.    Treatment Conditions:  Biological Infusion Checklist:  ~~~ NOTE: If the patient answers yes to any of the questions below, hold the infusion and contact ordering provider or on-call provider.    1. Have you recently had an elevated temperature, fever, chills, productive cough, coughing for 3 weeks or longer or hemoptysis, abnormal vital signs, night sweats,  chest pain or have you noticed a decrease in your appetite, unexplained weight loss or fatigue? No  2. Do you have any open wounds or new incisions? No  3. Do you have any recent or upcoming hospitalizations, surgeries or dental procedures? No  4. Do you currently have or recently have had any signs of illness or infection or are you on any antibiotics? No  5. Have you had any new, sudden or worsening abdominal pain? No  6. Have you or anyone in your household received a live vaccination in the past 4 weeks? Please note:  No live vaccines while on biologic/chemotherapy until 6 months after the last treatment.  Patient can receive the flu vaccine (shot only) and the pneumovax.  It is optimal for the patient to get these vaccines mid cycle, but they can be given at any time as long as it is not on the day of the infusion. No  7. Have you recently been diagnosed with any new nervous system diseases (ie. Multiple sclerosis, Guillain [place], seizures, neurological changes) or cancer diagnosis? No  8. Are you on any form of radiation or chemotherapy? No  9. Are you pregnant or breast feeding or do you have plans of pregnancy in the future? No  10. Have you  been having any signs of worsening depression or suicidal ideations?  (benlysta only) No  11. Have there been any other new onset medical symptoms? No    Post Infusion Assessment:  Patient tolerated infusion without incident.  Rituxan titrated to max rate of 200cc/hr.  States at times she felt like her fingers/ hands were swelling, but this passed fairly quickly and she did not mention this until after infusion was completed.  She did not need to use her albuteral inhaler during infusion.   Blood return noted pre and post infusion.  Site patent and intact, free from redness, edema or discomfort.  No evidence of extravasations.  Access discontinued per protocol.       Discharge Plan:   Patient discharged in stable condition accompanied by: self.  AVS given to patient and reviewed side effects with patient.  Departure Mode: Ambulatory.  Will follow up with Dr. Chahal in July.  Nahed Yang RN

## 2019-07-09 ENCOUNTER — OFFICE VISIT (OUTPATIENT)
Dept: OPHTHALMOLOGY | Facility: CLINIC | Age: 54
End: 2019-07-09
Attending: OPHTHALMOLOGY
Payer: COMMERCIAL

## 2019-07-09 DIAGNOSIS — H53.10 SUBJECTIVE VISUAL DISTURBANCE: Primary | ICD-10-CM

## 2019-07-09 DIAGNOSIS — H53.40 VISUAL FIELD DEFECT: Primary | ICD-10-CM

## 2019-07-09 DIAGNOSIS — H04.123 DRY EYE SYNDROME OF BOTH EYES: ICD-10-CM

## 2019-07-09 DIAGNOSIS — H16.223 KERATOCONJUNCTIVITIS SICCA OF BOTH EYES NOT SPECIFIED AS SJOGREN'S: ICD-10-CM

## 2019-07-09 DIAGNOSIS — H05.10 IDIOPATHIC ORBITAL INFLAMMATORY SYNDROME: ICD-10-CM

## 2019-07-09 DIAGNOSIS — H53.10 SUBJECTIVE VISUAL DISTURBANCE: ICD-10-CM

## 2019-07-09 PROCEDURE — 92083 EXTENDED VISUAL FIELD XM: CPT | Mod: ZF | Performed by: OPHTHALMOLOGY

## 2019-07-09 PROCEDURE — G0463 HOSPITAL OUTPT CLINIC VISIT: HCPCS | Mod: ZF

## 2019-07-09 PROCEDURE — 92133 CPTRZD OPH DX IMG PST SGM ON: CPT | Mod: ZF | Performed by: OPHTHALMOLOGY

## 2019-07-09 RX ORDER — CYCLOSPORINE 0.5 MG/ML
1 EMULSION OPHTHALMIC 2 TIMES DAILY
Qty: 60 EACH | Refills: 11 | Status: SHIPPED | OUTPATIENT
Start: 2019-07-09 | End: 2019-07-09

## 2019-07-09 RX ORDER — CYCLOSPORINE 0.5 MG/ML
1 EMULSION OPHTHALMIC 2 TIMES DAILY
Qty: 60 EACH | Refills: 11 | Status: SHIPPED | OUTPATIENT
Start: 2019-07-09 | End: 2021-05-18

## 2019-07-09 ASSESSMENT — VISUAL ACUITY
OD_CC: 20/30
OS_CC: 20/20
METHOD: SNELLEN - LINEAR
CORRECTION_TYPE: GLASSES
OD_CC+: +2

## 2019-07-09 ASSESSMENT — CONF VISUAL FIELD
OD_NORMAL: 1
OS_NORMAL: 1

## 2019-07-09 ASSESSMENT — REFRACTION_WEARINGRX
OS_SPHERE: -3.75
OS_CYLINDER: +1.25
OS_AXIS: 010
SPECS_TYPE: PAL
OD_SPHERE: -3.50
OD_AXIS: 140
OD_CYLINDER: +1.00

## 2019-07-09 ASSESSMENT — CUP TO DISC RATIO
OS_RATIO: 0
OD_RATIO: 0

## 2019-07-09 ASSESSMENT — TONOMETRY
OS_IOP_MMHG: 19
OD_IOP_MMHG: 20
IOP_METHOD: ICARE

## 2019-07-09 ASSESSMENT — SLIT LAMP EXAM - LIDS: COMMENTS: NORMAL

## 2019-07-09 ASSESSMENT — EXTERNAL EXAM - LEFT EYE: OS_EXAM: NORMAL

## 2019-07-09 ASSESSMENT — EXTERNAL EXAM - RIGHT EYE: OD_EXAM: NORMAL

## 2019-07-09 NOTE — LETTER
2019         RE:  :  MRN: Janine Cornell  1965  [id number]     Dear Dr. Mckinnon:     Your patient, Janine Cornell, returned for neuro-ophthalmic follow up. My assessment and plan are below.  For further details, please see my attached clinic note.           Assessment & Plan     Janine Cornell is a 54 year old female with the following diagnoses:   1. Visual field defect    2. Subjective visual disturbance    3. 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  Last visit was 2 months ago.  At that time, we had noted worsening inflammation on CT orbits since  and thinning of the retinal nerve fiber layer over time.  She had rituxan in 2018 and 2019.      Her visual acuity is 20/30 RIGHT eye (stable to better) and 20/20 LEFT eye.  Her color vision is normal both eyes.  Her visual field shows a nasal step/arcuate (MD -3.2) RIGHT eye (better) and normal LEFT eye.  Her retinal nerve fiber layer is 63 RIGHT eye (last visit was 69). Her RIGHT eye is 87 (last visit was 87).  Ghassan exophthalmometry is 18/17 at 101 (much better).      It is my impression that her proptosis is much better in the RIGHT eye.  Her visual acuity is stable.  The visual field is improved.  I think she has had a good response to rituxan.  Will get another CT orbit to establish baseline and have her follow up in 6 months sooner as needed for worsening symptoms.         Again, thank you for allowing me to participate in the care of your patient.      Sincerely,    Jas Vernon MD  Professor  Ophthalmology Residency   Director of Neuro-Ophthalmology  [facility] [facility] Endowed Chair  Departments of Ophthalmology, Neurology, and Neurosurgery  [facility] [address] [place]  [address]  T - [phone]  F - [phone]  [email or web address]      CC: Majo Mckinnon MD  [address] [place] [address]  88  [facility] [address]  VIA In Basket     Charis Holbrook MD  [address]  [facility] [address]  VIA In Basket     Sangeetha Vasquez MD  [address]  [facility] [address]  VIA In Basket     Kash Solano MD  [address]  [facility] [address]  VIA In Basket     Mlian Peace MD  [address]  [facility] [address]  VIA In Basket     Katia Pastor, APRN CNP  [address]  [facility] [address]  VIA In Basket

## 2019-07-09 NOTE — PROGRESS NOTES
Assessment & Plan     Janine Cornell is a 54 year old female with the following diagnoses:   1. Visual field defect    2. Subjective visual disturbance    3. 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  Last visit was 2 months ago.  At that time, we had noted worsening inflammation on CT orbits since 2017 and thinning of the retinal nerve fiber layer over time.  She had rituxan in December 2018 and June 2019.      Her visual acuity is 20/30 RIGHT eye (stable to better) and 20/20 LEFT eye.  Her color vision is normal both eyes.  Her visual field shows a nasal step/arcuate (MD -3.2) RIGHT eye (better) and normal LEFT eye.  Her retinal nerve fiber layer is 63 RIGHT eye (last visit was 69). Her RIGHT eye is 87 (last visit was 87).  Ghassan exophthalmometry is 18/17 at 101 (much better).      It is my impression that her proptosis is much better in the RIGHT eye.  Her visual acuity is stable.  The visual field is improved.  I think she has had a good response to rituxan.  Will get another CT orbit to establish baseline and have her follow up in 6 months sooner as needed for worsening symptoms.             Attending Physician Attestation:  Complete documentation of historical and exam elements from today's encounter can be found in the full encounter summary report (not reduplicated in this progress note).  I personally obtained the chief complaint(s) and history of present illness.  I confirmed and edited as necessary the review of systems, past medical/surgical history, family history, social history, and examination findings as documented by others; and I examined the patient myself.  I personally reviewed the relevant tests, images, and reports as documented above.  I formulated and edited as necessary the assessment and plan and discussed the findings and management plan with the patient and family. - Jas Vernon MD

## 2019-07-09 NOTE — NURSING NOTE
Chief Complaints and History of Present Illnesses   Patient presents with     Blurred Vision Follow-Up     Chief Complaint(s) and History of Present Illness(es)     Blurred Vision Follow-Up               Comments     Janine Cornell is a 53 year old female with the following diagnoses:   1. ANCA-associated vasculitis (H)   2. Visual field defect   3. Idiopathic orbital inflammatory syndrome     Complains of intermittent swelling around her eyes. Uses ice packs. Happens several times a week.   No diplopia.     Stephanie Aminmasandra CO 2:10 PM July 9, 2019

## 2019-07-11 ENCOUNTER — OFFICE VISIT (OUTPATIENT)
Dept: CARDIOLOGY | Facility: CLINIC | Age: 54
End: 2019-07-11
Attending: INTERNAL MEDICINE
Payer: COMMERCIAL

## 2019-07-11 ENCOUNTER — ANCILLARY PROCEDURE (OUTPATIENT)
Dept: CT IMAGING | Facility: CLINIC | Age: 54
End: 2019-07-11
Attending: OPHTHALMOLOGY
Payer: COMMERCIAL

## 2019-07-11 VITALS
HEIGHT: 63 IN | HEART RATE: 76 BPM | SYSTOLIC BLOOD PRESSURE: 142 MMHG | OXYGEN SATURATION: 100 % | BODY MASS INDEX: 30.44 KG/M2 | WEIGHT: 171.8 LBS | DIASTOLIC BLOOD PRESSURE: 87 MMHG

## 2019-07-11 DIAGNOSIS — I25.5 ISCHEMIC CARDIOMYOPATHY: ICD-10-CM

## 2019-07-11 DIAGNOSIS — Z98.61 CAD S/P PERCUTANEOUS CORONARY ANGIOPLASTY: ICD-10-CM

## 2019-07-11 DIAGNOSIS — I11.9 HYPERTENSIVE HEART DISEASE WITHOUT HEART FAILURE: ICD-10-CM

## 2019-07-11 DIAGNOSIS — M06.00 SERONEGATIVE RHEUMATOID ARTHRITIS (H): ICD-10-CM

## 2019-07-11 DIAGNOSIS — I21.4 NSTEMI (NON-ST ELEVATED MYOCARDIAL INFARCTION) (H): ICD-10-CM

## 2019-07-11 DIAGNOSIS — I25.10 CAD S/P PERCUTANEOUS CORONARY ANGIOPLASTY: ICD-10-CM

## 2019-07-11 DIAGNOSIS — I10 BENIGN ESSENTIAL HYPERTENSION: ICD-10-CM

## 2019-07-11 DIAGNOSIS — Z79.4 TYPE 2 DIABETES MELLITUS WITH INSULIN THERAPY (H): ICD-10-CM

## 2019-07-11 DIAGNOSIS — I25.10 ASCVD (ARTERIOSCLEROTIC CARDIOVASCULAR DISEASE): ICD-10-CM

## 2019-07-11 DIAGNOSIS — E10.8 TYPE 1 DIABETES MELLITUS WITH COMPLICATIONS (H): ICD-10-CM

## 2019-07-11 DIAGNOSIS — E78.5 HYPERLIPIDEMIA LDL GOAL <70: ICD-10-CM

## 2019-07-11 DIAGNOSIS — H53.10 SUBJECTIVE VISUAL DISTURBANCE: ICD-10-CM

## 2019-07-11 DIAGNOSIS — E11.9 TYPE 2 DIABETES MELLITUS WITH INSULIN THERAPY (H): ICD-10-CM

## 2019-07-11 DIAGNOSIS — I10 HYPERTENSION GOAL BP (BLOOD PRESSURE) < 140/90: ICD-10-CM

## 2019-07-11 DIAGNOSIS — E78.5 HYPERLIPIDEMIA ASSOCIATED WITH TYPE 2 DIABETES MELLITUS (H): ICD-10-CM

## 2019-07-11 DIAGNOSIS — H05.10 IDIOPATHIC ORBITAL INFLAMMATORY SYNDROME: ICD-10-CM

## 2019-07-11 DIAGNOSIS — E11.69 HYPERLIPIDEMIA ASSOCIATED WITH TYPE 2 DIABETES MELLITUS (H): ICD-10-CM

## 2019-07-11 DIAGNOSIS — I10 HYPERTENSION, UNSPECIFIED TYPE: ICD-10-CM

## 2019-07-11 DIAGNOSIS — H53.40 VISUAL FIELD DEFECT: ICD-10-CM

## 2019-07-11 LAB
ALBUMIN SERPL-MCNC: 4.3 G/DL (ref 3.4–5)
ALP SERPL-CCNC: 76 U/L (ref 40–150)
ALT SERPL W P-5'-P-CCNC: 34 U/L (ref 0–50)
ANION GAP SERPL CALCULATED.3IONS-SCNC: 6 MMOL/L (ref 3–14)
AST SERPL W P-5'-P-CCNC: 21 U/L (ref 0–45)
BILIRUB SERPL-MCNC: 0.4 MG/DL (ref 0.2–1.3)
BUN SERPL-MCNC: 22 MG/DL (ref 7–30)
CALCIUM SERPL-MCNC: 9.3 MG/DL (ref 8.5–10.1)
CHLORIDE SERPL-SCNC: 102 MMOL/L (ref 94–109)
CHOLEST SERPL-MCNC: 132 MG/DL
CO2 SERPL-SCNC: 26 MMOL/L (ref 20–32)
CREAT SERPL-MCNC: 1.01 MG/DL (ref 0.52–1.04)
GFR SERPL CREATININE-BSD FRML MDRD: 63 ML/MIN/{1.73_M2}
GLUCOSE SERPL-MCNC: 166 MG/DL (ref 70–99)
HDLC SERPL-MCNC: 40 MG/DL
LDLC SERPL CALC-MCNC: 62 MG/DL
NONHDLC SERPL-MCNC: 92 MG/DL
POTASSIUM SERPL-SCNC: 3.9 MMOL/L (ref 3.4–5.3)
PROT SERPL-MCNC: 7.6 G/DL (ref 6.8–8.8)
SODIUM SERPL-SCNC: 135 MMOL/L (ref 133–144)
TRIGL SERPL-MCNC: 149 MG/DL

## 2019-07-11 PROCEDURE — 99213 OFFICE O/P EST LOW 20 MIN: CPT | Mod: ZP | Performed by: INTERNAL MEDICINE

## 2019-07-11 PROCEDURE — 80053 COMPREHEN METABOLIC PANEL: CPT | Performed by: INTERNAL MEDICINE

## 2019-07-11 PROCEDURE — 36415 COLL VENOUS BLD VENIPUNCTURE: CPT | Performed by: INTERNAL MEDICINE

## 2019-07-11 PROCEDURE — 80061 LIPID PANEL: CPT | Performed by: INTERNAL MEDICINE

## 2019-07-11 RX ORDER — ASPIRIN 81 MG/1
81 TABLET, COATED ORAL DAILY
Qty: 30 TABLET | Refills: 11 | Status: SHIPPED | OUTPATIENT
Start: 2019-07-11 | End: 2019-07-16

## 2019-07-11 RX ORDER — LISINOPRIL AND HYDROCHLOROTHIAZIDE 20; 25 MG/1; MG/1
1 TABLET ORAL DAILY
Qty: 30 TABLET | Refills: 11 | Status: SHIPPED | OUTPATIENT
Start: 2019-07-11 | End: 2020-07-06 | Stop reason: ALTCHOICE

## 2019-07-11 RX ORDER — METOPROLOL SUCCINATE 100 MG/1
100 TABLET, EXTENDED RELEASE ORAL DAILY
Qty: 30 TABLET | Refills: 11 | Status: SHIPPED | OUTPATIENT
Start: 2019-07-11 | End: 2020-07-06

## 2019-07-11 RX ORDER — CLOPIDOGREL BISULFATE 75 MG/1
75 TABLET ORAL DAILY
Qty: 30 TABLET | Refills: 11 | Status: SHIPPED | OUTPATIENT
Start: 2019-07-11 | End: 2020-07-06

## 2019-07-11 ASSESSMENT — PAIN SCALES - GENERAL: PAINLEVEL: NO PAIN (0)

## 2019-07-11 ASSESSMENT — MIFFLIN-ST. JEOR: SCORE: 1353.41

## 2019-07-11 NOTE — PATIENT INSTRUCTIONS
Patient Instructions:  It was a pleasure to see you in the cardiology clinic today.      If you have any questions, you can reach my nurse, Selena CORBETT LPN, at [phone].  Press Option #1 for the [facility], and then press Option #4 for nursing.    We are encouraging the use of [email or web address] to communicate with your HealthCare Provider    Medication Changes: None.    Recommendations: None.    Studies Ordered: Echocardiogram in one year.    The results from today include: Labs and Echocardiogram.    Please follow up: With Dr. Peace in one year with fasting labs prior.    Sincerely,    Milan Peace MD     If you have an urgent need after hours (8:00 am to 4:30 pm) please call [phone] and ask for the cardiology fellow on call.

## 2019-07-11 NOTE — LETTER
7/11/2019      RE: Janine Cornell  [address] [id number]       Dear Colleague,    Thank you for the opportunity to participate in the care of your patient, Janine Cornell, at the [facility] HEART [facility]. Please see a copy of my visit note below.    HPI:     Ms Janine Cornell, who is a 53 yr -American patient with DM2, Hypothyroidism, PCOS, Dyslipidemia, HTN, and S/PNSTEMI (11/2011) s/p stent placement comes for follow-up.      Patient has a positive RA factor and fibromyalgia (see recent visit rheumatology)    Patient denies chest pain, shortness of breath, palpitations and intermittent claudication.    Patient mentions that she sometimes shows swelling in lower extremities and face.           PAST MEDICAL HISTORY:  Past Medical History:   Diagnosis Date     Abnormal glandular Papanicolaou smear of cervix 1992     Allergic rhinitis     Allergy, airborne subst     Arthritis      ASCVD (arteriosclerotic cardiovascular disease)      Chronic pain      Chronic pancreatitis (H)     idiopathic, Tx: PPI, antioxidants, pancreatic enzymes     Common migraine      Coronary artery disease      Costochondritis      Difficulty in walking(719.7)      Dyspnea on exertion      Ectasia, mammary duct     [facility], persistent nipple discharge     Elevated fasting glucose      Gastro-oesophageal reflux disease      Hernia      History of angina      Hyperlipidemia LDL goal < 100      Hypertension goal BP (blood pressure) < 140/90     Essential hypertension     Iron deficiency anemia      Ischemic cardiomyopathy      Menorrhagia      Migraine headaches      Mild persistent asthma      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 Matias, neg w/u, Rx'd w/prednisone     Ventral hernia, unspecified, without mention of obstruction or gangrene 7/2012       CURRENT MEDICATIONS:  Current Outpatient Medications   Medication Sig Dispense Refill     acetaminophen (TYLENOL) 325 MG tablet Take 1-2 tablets (325-650 mg) by mouth every 6 hours as needed for pain (headache) 250 tablet 0     albuterol (2.5 MG/3ML) 0.083% neb solution INL 1 VIAL VIA NEBULIZATION Q 4 TO 6 HOURS PRN  1     albuterol (PROAIR HFA, PROVENTIL HFA, VENTOLIN HFA) 108 (90 BASE) MCG/ACT inhaler Inhale 2 puffs into the lungs every 6 hours as needed for shortness of breath / dyspnea or wheezing 3 Inhaler 1     ASPIRIN LOW DOSE 81 MG EC tablet Take 1 tablet (81 mg) by mouth daily 90 tablet 3     blood glucose monitoring (NO BRAND SPECIFIED) meter device kit Use to test blood sugar 4 X times daily or as directed. (Patient taking differently: 1 kit Use to test blood sugar 4 X times daily or as directed.) 1 kit 0     blood glucose monitoring (NO BRAND SPECIFIED) test strip 1 strip by In Vitro route 4 times daily Test as directed. Please dispense three months and three months of refills. Thank you. (Patient taking differently: 1 strip by In Vitro route 4 times daily Test as directed. Please dispense three months and three months of refills. Thank you.) 360 each 3     calcium carbonate (TUMS) 500 MG chewable tablet Take 3-4 chew tab by mouth daily as needed.       clopidogrel (PLAVIX) 75 MG tablet Take 1 tablet (75 mg) by mouth daily 90 tablet 2     cyclobenzaprine  (FLEXERIL) 10 MG tablet Take 1 tablet (10 mg) by mouth 2 times daily as needed for muscle spasms 60 tablet 2     cycloSPORINE (RESTASIS) 0.05 % ophthalmic emulsion Place 1 drop into both eyes 2 times daily 60 each 11     cycloSPORINE (RESTASIS) 0.05 % ophthalmic emulsion Place 1 drop into the right eye every 12 hours       desonide (DESOWEN) 0.05 % cream Apply topically 2 times daily       dicyclomine (BENTYL) 20 MG tablet TAKE 1 TABLET(20 MG) BY MOUTH FOUR TIMES DAILY AS NEEDED 60 tablet 3     diphenhydrAMINE (BENADRYL) 25 MG capsule TK 1 TO 2 CS PO QHS  4     EPIPEN 2-RIKY 0.3 MG/0.3ML injection INJECT 0.3 MG INTO THE MUSCLE PRF ANAPHYALAXIS  0     ferrous gluconate (FERGON) 324 (38 Fe) MG tablet Take 1 tablet (324 mg) by mouth 2 times daily (with meals) 180 tablet 3     fexofenadine (ALLEGRA) 180 MG tablet Take 1 tablet by mouth daily as needed. 90 tablet 3     fluocinolone (SYNALAR) 0.01 % solution Apply topically daily as needed       fluticasone-vilanterol (BREO ELLIPTA) 100-25 MCG/INH inhaler Inhale 1 puff into the lungs daily       folic acid (FOLVITE) 1 MG tablet Take 1 tablet by mouth daily 90 tablet 3     hydroxychloroquine (PLAQUENIL) 200 MG tablet Take 2 tablets (400 mg) by mouth daily 180 tablet 1     insulin pen needle (BD MARCK U/F) 32G X 4 MM USE DAILY OR AS DIRECTED 300 each 3     ketoconazole (NIZORAL) 2 % shampoo Apply topically daily as needed       lisinopril-hydrochlorothiazide (PRINZIDE/ZESTORETIC) 20-25 MG tablet Take 1 tablet by mouth daily 90 tablet 2     Magnesium Oxide -Mg Supplement 250 MG TABS TK 1 T PO BID. REDUCE IF STOOLS LOOSEN  11     metFORMIN (GLUCOPHAGE-XR) 500 MG 24 hr tablet TAKE 2 TABLETS(1000 MG) BY MOUTH DAILY WITH DINNER 180 tablet 0     metoclopramide (REGLAN) 10 MG tablet Take 1 tablet (10 mg) by mouth 4 times daily (before meals and nightly) 90 tablet 0     metoprolol succinate ER (TOPROL-XL) 100 MG 24 hr tablet Take 1 tablet (100 mg) by mouth daily 90 tablet 2      metroNIDAZOLE (NORITATE) 1 % cream Apply topically daily       montelukast (SINGULAIR) 10 MG tablet Take 1 tablet (10 mg) by mouth At Bedtime 30 tablet 1     Multiple Vitamin (DAILY-GERALD) TABS Take 1 tablet by mouth daily  0     nitroGLYcerin (NITROSTAT) 0.4 MG sublingual tablet Place 1 tablet (0.4 mg) under the tongue every 5 minutes as needed for chest pain 25 tablet 1     nystatin (MYCOSTATIN) 694321 UNITS TABS tablet TK 2 TS PO BID  0     ondansetron (ZOFRAN-ODT) 8 MG ODT tab Take 1 tablet (8 mg) by mouth every 8 hours as needed for nausea 60 tablet 1     pantoprazole (PROTONIX) 40 MG EC tablet Take 1 tablet (40 mg) by mouth daily Pork free tablets. 30 tablet 1     pravastatin (PRAVACHOL) 40 MG tablet Take 1 tablet (40 mg) by mouth daily 90 tablet 2     ranitidine (ZANTAC) 150 MG tablet Take 1 tablet (150 mg) by mouth 2 times daily 180 tablet 1     spironolactone (ALDACTONE) 50 MG tablet Take 1 tablet (50 mg) by mouth daily . Take additional 0.5 tablets by mouth once daily as needed for weight gain. 90 tablet 2     sucralfate (CARAFATE) 1 GM tablet Take 1 tablet (1 g) by mouth 4 times daily 120 tablet 3     traMADol (ULTRAM) 50 MG tablet Take 1 tablet (50 mg) by mouth every 8 hours as needed for moderate pain 60 tablet 3     Triamcinolone Acetonide (NASACORT ALLERGY 24HR NA)        vitamin D (ERGOCALCIFEROL) 27203 UNIT capsule Take 1 capsule (50,000 Units) by mouth every 7 days Need a Vitamin D level in 2 months. (Patient taking differently: Take 50,000 Units by mouth every 7 days Need a Vitamin D level in 2 months.) 8 capsule 0     BASAGLAR 100 UNIT/ML injection Inject 40 Units Subcutaneous daily (Patient not taking: Reported on 7/11/2019) 12 mL 2     COMPRESSION STOCKINGS 2 each daily Apply one 10-15 mmHg compression stocking to each lower extgmierty during the day and remove before bedtime. (Patient not taking: Reported on 6/3/2019) 4 each 2     lifitegrast (XIIDRA) 5 % opthalmic solution Place 1 drop into  both eyes 2 times daily (Patient not taking: Reported on 6/3/2019) 20 each 3       PAST SURGICAL HISTORY:  Past Surgical History:   Procedure Laterality Date     C ECHO HEART XTHORACIC,COMPLETE, W/O DOPPLER  04    [facility] WNL, EF 60%     C/SECTION, LOW TRANSVERSE           CARDIAC SURGERY      cardiac stent- recent in 16; 4 other stents     DILATION AND CURETTAGE N/A 2016    Procedure: DILATION AND CURETTAGE;  Surgeon: Nahed Butler MD;  Location: [facility] OR     HC UGI ENDOSCOPY W EUS  08    Dr. Pastrana, possible chronic pancreatitis, fatty liver     HERNIA REPAIR  2012    done at [facility] [place]     INSERT INTRAUTERINE DEVICE N/A 2016    Procedure: INSERT INTRAUTERINE DEVICE;  Surgeon: Nahed Butler MD;  Location: [facility] OR     INT UTERINE FIBRIOD EMBOLIZATION  10/29/2014     LAPAROSCOPIC CHOLECYSTECTOMY  08    Dr. Arnol GRUBBS TX, CERVICAL      s/p LEEP     ORBITOTOMY Right 3/15/2016    Procedure: ORBITOTOMY;  Surgeon: Myron Cyr MD;  Location:  [facility]     ORBITOTOMY Right 2017    Procedure: ORBITOTOMY;  RIGHT ORBITOTOMY AND BIOPSY;  Surgeon: Charis Holbrook MD;  Location: [facility]     REPAIR PTOSIS Right 2017    Procedure: REPAIR PTOSIS;  RIGHT UPPER LID PTOSIS REPAIR;  Surgeon: Myron Cyr MD;  [facility]     UPPER GI ENDOSCOPY  10/21/08    mild gastritis, Dr. Hidalgo       ALLERGIES     Allergies   Allergen Reactions     Amoxicillin-Pot Clavulanate      Augmentin      Unknown possible hives and edema     Azithromycin      Diatrizoate Other (See Comments)     Pt wants this listed because she is allergic to shellfish      Imitrex [Sumatriptan]      Severe face/neck/chest tightness and flushing side effects      Penicillins Hives     Unknown      Pork Allergy      Stomach pain, cramping, diarrhea, itching, nausea and headaches     Shellfish Allergy Hives and Swelling     Sulfa Drugs Hives and Swelling     Zithromax [Azithromycin Dihydrate]  Swelling     Unknown        FAMILY HISTORY:  Family History   Problem Relation Age of Onset     Heart Disease Father 50        heart attack     Cerebrovascular Disease Father      Diabetes Father      Hypertension Father      Depression Father      C.A.D. Father      Hypertension Mother      Arthritis Mother      Heart Disease Mother         long qt syndrome     Thyroid Disease Mother      C.A.D. Mother      Heart Disease Brother 15        MI at 15, 16.      Diabetes Maternal Uncle      Hypertension Maternal Aunt      Hypertension Maternal Uncle      Arthritis Brother         he passed away, had severe arthritis at age 11     Arthritis Maternal Uncle      Eye Disorder Maternal Uncle         cataracts     Neurologic Disorder Sister         migraines     Neurologic Disorder Sister         migraines     Respiratory Son         asthma     Cerebrovascular Disease Maternal Uncle      C.A.D. Brother      Family History Negative Other         neg for RA, SLE     Unknown/Adopted No family hx of         unknown neurological issues in her family, mother was adopted     Skin Cancer No family hx of         No known family hx of skin cancer       SOCIAL HISTORY:  Social History     Socioeconomic History     Marital status: Single     Spouse name: None     Number of children: 1     Years of education: None     Highest education level: None   Occupational History     Employer: NONE    Social Needs     Financial resource strain: None     Food insecurity:     Worry: None     Inability: None     Transportation needs:     Medical: None     Non-medical: None   Tobacco Use     Smoking status: Current Every Day Smoker     Packs/day: 0.20     Years: 1.00     Pack years: 0.20     Types: Cigarettes     Last attempt to quit: 2016     Years since quitting: 3.4     Smokeless tobacco: Never Used   Substance and Sexual Activity     Alcohol use: No     Alcohol/week: 0.0 oz     Drug use: No     Sexual activity: Not Currently   Lifestyle      Physical activity:     Days per week: None     Minutes per session: None     Stress: None   Relationships     Social connections:     Talks on phone: None     Gets together: None     Attends Church service: None     Active member of club or organization: None     Attends meetings of clubs or organizations: None     Relationship status: None     Intimate partner violence:     Fear of current or ex partner: None     Emotionally abused: None     Physically abused: None     Forced sexual activity: None   Other Topics Concern     Parent/sibling w/ CABG, MI or angioplasty before 65F 55M? Yes   Social History Narrative    Balanced Diet - sometimes    Osteoporosis Prevention Measures - Dairy servings per day: 2 servings weekly    Regular Exercise -  Yes Describe walking 4 times a week    Dental Exam - NO    Seatbelts used - Yes    Self Breast Exam - Yes    Abuse: Current or Past (Physical, Sexual or Emotional)- No    Do you have any concerns about STD's -  No    Do you feel safe in your environment - No    Guns stored in the home - No    Sunscreen used - Yes    Lipids -  YES - Date: 053102    Glucose -  YES - Date: 012804    Eye Exam - YES - Date: one year ago    Colon Cancer Screening - No    Hemoccults - NO    Pap Test -  YES - Date: 070904, remote history of LEEP    Mammography - YES - Date: last spring, would like to discuss, needs a referral to [facility]    DEXA - NO    Immunizations reviewed and up to date - Yes, last td given in 1997 or 1998       ROS:   Constitutional: No fever, chills, or sweats. Intermittent weight gain/loss   ENT: No visual disturbance, ear ache, epistaxis, sore throat  Allergies/Immunologic: Negative.   Respiratory: No cough, hemoptysia  Cardiovascular: As per HPI  GI: No nausea, vomiting, hematemesis, melena, or hematochezia  : No urinary frequency, dysuria, or hematuria  Integument: Negative  Psychiatric: Negative  Neuro: Negative  Endocrinology: Negative  "  Musculoskeletal: Negative    EXAM:  /87 (BP Location: Left arm, Patient Position: Chair, Cuff Size: Adult Large)   Pulse 76   Ht 1.6 m (5' 3\")   Wt 77.9 kg (171 lb 12.8 oz)   SpO2 100%   BMI 30.43 kg/m     In general, the patient is a pleasant female in no apparent distress.    HEENT: NC/AT.  PERRLA.  EOMI.  Sclerae white, not injected.  Nares clear.  Pharynx without erythema or exudate.  Dentition intact.    Neck: No adenopathy.  No thyromegaly. Carotids +4/4 bilaterally without bruits.  No jugular venous distension.   Heart: RRR. Normal S1, S2 splits physiologically. No murmur, rub, click, or gallop. The PMI is in the 5th ICS in the midclavicular line. There is no heave.    Lungs: CTA.  No ronchi, wheezes, rales.  No dullness to percussion.   Abdomen: Soft, nontender, nondistended. No organomegaly.  No bruits.   Extremities: No clubbing, cyanosis, or edema.  The pulses are +4/4 at the radial, brachial, femoral, popliteal, DP, and PT sites bilaterally.  No bruits are noted.  Neurologic: Alert and oriented to person/place/time, normal speech, gait and affect  Skin: No petechiae, purpura or rash.      BP at the end of visit: 128/86     Labs:  LIPID RESULTS:  Lab Results   Component Value Date    CHOL 132 07/11/2019    HDL 40 (L) 07/11/2019    LDL 62 07/11/2019    TRIG 149 07/11/2019    CHOLHDLRATIO 3.5 07/29/2015    NHDL 92 07/11/2019       LIVER ENZYME RESULTS:  Lab Results   Component Value Date    AST 21 07/11/2019    ALT 34 07/11/2019       CBC RESULTS:  Lab Results   Component Value Date    WBC 12.5 (H) 03/06/2019    RBC 4.90 03/06/2019    HGB 12.5 03/06/2019    HCT 39.4 03/06/2019    MCV 80 03/06/2019    MCH 25.5 (L) 03/06/2019    MCHC 31.7 03/06/2019    RDW 13.8 03/06/2019     03/06/2019       BMP RESULTS:  Lab Results   Component Value Date     07/11/2019    POTASSIUM 3.9 07/11/2019    CHLORIDE 102 07/11/2019    CO2 26 07/11/2019    ANIONGAP 6 07/11/2019     (H) 07/11/2019    " BUN 22 07/11/2019    CR 1.01 07/11/2019    GFRESTIMATED 63 07/11/2019    GFRESTBLACK 74 07/11/2019    KATHY 9.3 07/11/2019        A1C RESULTS:  Lab Results   Component Value Date    A1C 9.2 (H) 03/06/2019       INR RESULTS:  Lab Results   Component Value Date    INR 0.97 08/25/2016    INR 0.98 05/20/2015       Procedures:   Echocardiogram 07-11-19  Global and regional left ventricular function is normal with an EF of 55-60%.  Mild LVH.  Left ventricular diastolic function is indeterminate.  Right ventricular function, chamber size, wall motion, and thickness are  normal.  No significant valve pathology.  Normal estimated right atrial pressure.  Cannot estimate PA pressure.  No pericardial effusion.     There has been no change.  This study was compared with the study from 7/18/2018  _____________________________________________________________________________  __        Left Ventricle  Global and regional left ventricular function is normal with an EF of 55-60%.  Mild concentric wall thickening consistent with left ventricular hypertrophy  is present. Left ventricular diastolic function is indeterminate. No regional  wall motion abnormalities are seen.     Right Ventricle  Right ventricular function, chamber size, wall motion, and thickness are  normal.     Atria  The right atria appears normal. Mild left atrial enlargement is present.     Mitral Valve  Mitral leaflet thickness is normal . Trace mitral insufficiency is present.        Aortic Valve  Aortic valve is normal in structure and function. No significant dysfunction.  Not visualized well.     Tricuspid Valve  The tricuspid valve is normal. Trace tricuspid insufficiency is present. The  peak velocity of the tricuspid regurgitant jet is not obtainable. Pulmonary  artery systolic pressure cannot be assessed.     Pulmonic Valve  The pulmonic valve is normal. Trace pulmonic insufficiency is present.     Vessels  The aorta root is normal. The thoracic aorta is  normal. The inferior vena cava  is normal. IVC diameter <2.1 cm collapsing >50% with sniff suggests a normal  RA pressure of 3 mmHg.     Pericardium  Prominent epicardial fat is noted. No pericardial effusion is present.        Compared to Previous Study  There has been no change. This study was compared with the study from  2018 .  _____________________________________________________________________________  __  MMode/2D Measurements & Calculations  IVSd: 1.2 cm     LVIDd: 4.3 cm  LVIDs: 3.0 cm  LVPWd: 1.1 cm  FS: 29.3 %  LV mass(C)d: 170.1 grams  LV mass(C)dI: 96.3 grams/m2  Ao root diam: 2.8 cm  asc Aorta Diam: 2.8 cm  LVOT diam: 2.0 cm  LVOT area: 3.2 cm2  RWT: 0.51        Doppler Measurements & Calculations  MV E max ash: 66.0 cm/sec  MV A max ash: 85.9 cm/sec  MV E/A: 0.77  MV dec slope: 288.0 cm/sec2  MV dec time: 0.23 sec  E/E' av.8  Lateral E/e': 7.7  Medial E/e': 11.9          Assessment and Plan:   We discussed the results with patient:  We discussed the importance of a heart healthy diet and lifestyle.  We continue with same medical regimen.  Because of her swelling we asked her to check her weight in the morning and in the evening to sort out if there is real weight gain or not - the latter condition would be due distribution of fluid due to venodilation in lower extremities.       Please follow up: With Dr. Peace in one year with fasting labs prior and echocardiogram.       Milan Peace MD, PhD  Professor of Medicine  Division of Cardiology    CC  Patient Care Team:  Kash Solano MD as PCP - General (Family Practice)  Milan Peace MD as MD (Cardiology)  Majo Mckinnon MD as MD (Rheumatology)  Jas Vernon MD as MD (Ophthalmology)  Jas Vernon MD as Referring Physician (Ophthalmology)  Charis Holbrook MD as Resident (Ophthalmology)  Katia Pastor APRN CNP as Assigned PCP  Sangeetha Vasquez MD as MD (Otolaryngology)  DARLING THOMPSON

## 2019-07-11 NOTE — PROGRESS NOTES
HPI:     Ms Janine Cornell, who is a 53 yr -American patient with DM2, Hypothyroidism, PCOS, Dyslipidemia, HTN, and S/PNSTEMI (11/2011) s/p stent placement comes for follow-up.      Patient has a positive RA factor and fibromyalgia (see recent visit rheumatology)    Patient denies chest pain, shortness of breath, palpitations and intermittent claudication.    Patient mentions that she sometimes shows swelling in lower extremities and face.           PAST MEDICAL HISTORY:  Past Medical History:   Diagnosis Date     Abnormal glandular Papanicolaou smear of cervix 1992     Allergic rhinitis     Allergy, airborne subst     Arthritis      ASCVD (arteriosclerotic cardiovascular disease)      Chronic pain      Chronic pancreatitis (H)     idiopathic, Tx: PPI, antioxidants, pancreatic enzymes     Common migraine      Coronary artery disease      Costochondritis      Difficulty in walking(719.7)      Dyspnea on exertion      Ectasia, mammary duct     [facility], persistent nipple discharge     Elevated fasting glucose      Gastro-oesophageal reflux disease      Hernia      History of angina      Hyperlipidemia LDL goal < 100      Hypertension goal BP (blood pressure) < 140/90     Essential hypertension     Iron deficiency anemia      Ischemic cardiomyopathy      Menorrhagia      Migraine headaches      Mild persistent asthma      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 Matias, neg w/u, Rx'd w/prednisone     Ventral hernia, unspecified, without mention of obstruction or gangrene 7/2012       CURRENT MEDICATIONS:  Current Outpatient Medications   Medication Sig Dispense Refill     acetaminophen (TYLENOL) 325 MG tablet Take 1-2 tablets (325-650 mg) by mouth every 6 hours as needed for pain (headache) 250 tablet 0     albuterol (2.5 MG/3ML) 0.083% neb solution INL 1 VIAL VIA NEBULIZATION Q 4 TO 6 HOURS PRN  1     albuterol (PROAIR HFA, PROVENTIL HFA, VENTOLIN HFA) 108 (90 BASE) MCG/ACT inhaler Inhale 2 puffs into the lungs every 6 hours as needed for shortness of breath / dyspnea or wheezing 3 Inhaler 1     ASPIRIN LOW DOSE 81 MG EC tablet Take 1 tablet (81 mg) by mouth daily 90 tablet 3     blood glucose monitoring (NO BRAND SPECIFIED) meter device kit Use to test blood sugar 4 X times daily or as directed. (Patient taking differently: 1 kit Use to test blood sugar 4 X times daily or as directed.) 1 kit 0     blood glucose monitoring (NO BRAND SPECIFIED) test strip 1 strip by In Vitro route 4 times daily Test as directed. Please dispense three months and three months of refills. Thank you. (Patient taking differently: 1 strip by In Vitro route 4 times daily Test as directed. Please dispense three months and three months of refills. Thank you.) 360 each 3     calcium carbonate (TUMS) 500 MG chewable tablet Take 3-4 chew tab by mouth daily as needed.       clopidogrel (PLAVIX) 75 MG tablet Take 1 tablet (75 mg) by mouth daily 90 tablet 2     cyclobenzaprine (FLEXERIL) 10 MG tablet Take 1 tablet (10 mg) by mouth 2 times daily as needed for muscle spasms 60 tablet 2     cycloSPORINE (RESTASIS) 0.05 % ophthalmic emulsion Place 1 drop into both eyes 2 times daily 60 each 11     cycloSPORINE (RESTASIS) 0.05 % ophthalmic emulsion Place 1 drop into the right eye every 12 hours        desonide (DESOWEN) 0.05 % cream Apply topically 2 times daily       dicyclomine (BENTYL) 20 MG tablet TAKE 1 TABLET(20 MG) BY MOUTH FOUR TIMES DAILY AS NEEDED 60 tablet 3     diphenhydrAMINE (BENADRYL) 25 MG capsule TK 1 TO 2 CS PO QHS  4     EPIPEN 2-RIKY 0.3 MG/0.3ML injection INJECT 0.3 MG INTO THE MUSCLE PRF ANAPHYALAXIS  0     ferrous gluconate (FERGON) 324 (38 Fe) MG tablet Take 1 tablet (324 mg) by mouth 2 times daily (with meals) 180 tablet 3     fexofenadine (ALLEGRA) 180 MG tablet Take 1 tablet by mouth daily as needed. 90 tablet 3     fluocinolone (SYNALAR) 0.01 % solution Apply topically daily as needed       fluticasone-vilanterol (BREO ELLIPTA) 100-25 MCG/INH inhaler Inhale 1 puff into the lungs daily       folic acid (FOLVITE) 1 MG tablet Take 1 tablet by mouth daily 90 tablet 3     hydroxychloroquine (PLAQUENIL) 200 MG tablet Take 2 tablets (400 mg) by mouth daily 180 tablet 1     insulin pen needle (BD MARCK U/F) 32G X 4 MM USE DAILY OR AS DIRECTED 300 each 3     ketoconazole (NIZORAL) 2 % shampoo Apply topically daily as needed       lisinopril-hydrochlorothiazide (PRINZIDE/ZESTORETIC) 20-25 MG tablet Take 1 tablet by mouth daily 90 tablet 2     Magnesium Oxide -Mg Supplement 250 MG TABS TK 1 T PO BID. REDUCE IF STOOLS LOOSEN  11     metFORMIN (GLUCOPHAGE-XR) 500 MG 24 hr tablet TAKE 2 TABLETS(1000 MG) BY MOUTH DAILY WITH DINNER 180 tablet 0     metoclopramide (REGLAN) 10 MG tablet Take 1 tablet (10 mg) by mouth 4 times daily (before meals and nightly) 90 tablet 0     metoprolol succinate ER (TOPROL-XL) 100 MG 24 hr tablet Take 1 tablet (100 mg) by mouth daily 90 tablet 2     metroNIDAZOLE (NORITATE) 1 % cream Apply topically daily       montelukast (SINGULAIR) 10 MG tablet Take 1 tablet (10 mg) by mouth At Bedtime 30 tablet 1     Multiple Vitamin (DAILY-GERALD) TABS Take 1 tablet by mouth daily  0     nitroGLYcerin (NITROSTAT) 0.4 MG sublingual tablet Place 1 tablet (0.4 mg) under the tongue every  5 minutes as needed for chest pain 25 tablet 1     nystatin (MYCOSTATIN) 813215 UNITS TABS tablet TK 2 TS PO BID  0     ondansetron (ZOFRAN-ODT) 8 MG ODT tab Take 1 tablet (8 mg) by mouth every 8 hours as needed for nausea 60 tablet 1     pantoprazole (PROTONIX) 40 MG EC tablet Take 1 tablet (40 mg) by mouth daily Pork free tablets. 30 tablet 1     pravastatin (PRAVACHOL) 40 MG tablet Take 1 tablet (40 mg) by mouth daily 90 tablet 2     ranitidine (ZANTAC) 150 MG tablet Take 1 tablet (150 mg) by mouth 2 times daily 180 tablet 1     spironolactone (ALDACTONE) 50 MG tablet Take 1 tablet (50 mg) by mouth daily . Take additional 0.5 tablets by mouth once daily as needed for weight gain. 90 tablet 2     sucralfate (CARAFATE) 1 GM tablet Take 1 tablet (1 g) by mouth 4 times daily 120 tablet 3     traMADol (ULTRAM) 50 MG tablet Take 1 tablet (50 mg) by mouth every 8 hours as needed for moderate pain 60 tablet 3     Triamcinolone Acetonide (NASACORT ALLERGY 24HR NA)        vitamin D (ERGOCALCIFEROL) 99045 UNIT capsule Take 1 capsule (50,000 Units) by mouth every 7 days Need a Vitamin D level in 2 months. (Patient taking differently: Take 50,000 Units by mouth every 7 days Need a Vitamin D level in 2 months.) 8 capsule 0     BASAGLAR 100 UNIT/ML injection Inject 40 Units Subcutaneous daily (Patient not taking: Reported on 2019) 12 mL 2     COMPRESSION STOCKINGS 2 each daily Apply one 10-15 mmHg compression stocking to each lower extgmierty during the day and remove before bedtime. (Patient not taking: Reported on 6/3/2019) 4 each 2     lifitegrast (XIIDRA) 5 % opthalmic solution Place 1 drop into both eyes 2 times daily (Patient not taking: Reported on 6/3/2019) 20 each 3       PAST SURGICAL HISTORY:  Past Surgical History:   Procedure Laterality Date     C ECHO HEART XTHORACIC,COMPLETE, W/O DOPPLER  04    [facility] WNL, EF 60%     C/SECTION, LOW TRANSVERSE           CARDIAC SURGERY       cardiac stent- recent in 2/9/16; 4 other stents     DILATION AND CURETTAGE N/A 7/6/2016    Procedure: DILATION AND CURETTAGE;  Surgeon: Nahed Butler MD;  Location: [facility]     HC UGI ENDOSCOPY W EUS  11/7/08    Dr. Pastrana, possible chronic pancreatitis, fatty liver     HERNIA REPAIR  12/2012    [place] [facility] [place]     INSERT INTRAUTERINE DEVICE N/A 7/6/2016    Procedure: INSERT INTRAUTERINE DEVICE;  Surgeon: Nahed Butler MD;  Location: [facility]     INT UTERINE FIBRIOD EMBOLIZATION  10/29/2014     LAPAROSCOPIC CHOLECYSTECTOMY  5/28/08    Dr. Arnol GRUBBS TX, CERVICAL      s/p LEEP     ORBITOTOMY Right 3/15/2016    Procedure: ORBITOTOMY;  Surgeon: Myron Cyr MD;  Location: [facility]     ORBITOTOMY Right 8/4/2017    Procedure: ORBITOTOMY;  RIGHT ORBITOTOMY AND BIOPSY;  Surgeon: Charis Holbrook MD;  Location: [facility]     REPAIR PTOSIS Right 11/17/2017    Procedure: REPAIR PTOSIS;  RIGHT UPPER LID PTOSIS REPAIR;  Surgeon: Myron Cyr MD;  Location: [facility]     UPPER GI ENDOSCOPY  10/21/08    mild gastritis, Dr. Hidalgo       ALLERGIES     Allergies   Allergen Reactions     Amoxicillin-Pot Clavulanate      Augmentin      Unknown possible hives and edema     Azithromycin      Diatrizoate Other (See Comments)     Pt wants this listed because she is allergic to shellfish      Imitrex [Sumatriptan]      Severe face/neck/chest tightness and flushing side effects      Penicillins Hives     Unknown      Pork Allergy      Stomach pain, cramping, diarrhea, itching, nausea and headaches     Shellfish Allergy Hives and Swelling     Sulfa Drugs Hives and Swelling     Zithromax [Azithromycin Dihydrate] Swelling     Unknown        FAMILY HISTORY:  Family History   Problem Relation Age of Onset     Heart Disease Father 50        heart attack     Cerebrovascular Disease Father      Diabetes Father      Hypertension Father      Depression Father      C.A.D. Father      Hypertension Mother      Arthritis Mother      Heart  Disease Mother         long qt syndrome     Thyroid Disease Mother      C.A.D. Mother      Heart Disease Brother 15        MI at 15, 16.      Diabetes Maternal Uncle      Hypertension Maternal Aunt      Hypertension Maternal Uncle      Arthritis Brother         he passed away, had severe arthritis at age 11     Arthritis Maternal Uncle      Eye Disorder Maternal Uncle         cataracts     Neurologic Disorder Sister         migraines     Neurologic Disorder Sister         migraines     Respiratory Son         asthma     Cerebrovascular Disease Maternal Uncle      C.A.D. Brother      Family History Negative Other         neg for RA, SLE     Unknown/Adopted No family hx of         unknown neurological issues in her family, mother was adopted     Skin Cancer No family hx of         No known family hx of skin cancer       SOCIAL HISTORY:  Social History     Socioeconomic History     Marital status: Single     Spouse name: None     Number of children: 1     Years of education: None     Highest education level: None   Occupational History     Employer: NONE    Social Needs     Financial resource strain: None     Food insecurity:     Worry: None     Inability: None     Transportation needs:     Medical: None     Non-medical: None   Tobacco Use     Smoking status: Current Every Day Smoker     Packs/day: 0.20     Years: 1.00     Pack years: 0.20     Types: Cigarettes     Last attempt to quit: 2016     Years since quitting: 3.4     Smokeless tobacco: Never Used   Substance and Sexual Activity     Alcohol use: No     Alcohol/week: 0.0 oz     Drug use: No     Sexual activity: Not Currently   Lifestyle     Physical activity:     Days per week: None     Minutes per session: None     Stress: None   Relationships     Social connections:     Talks on phone: None     Gets together: None     Attends Muslim service: None     Active member of club or organization: None     Attends meetings of clubs or organizations: None  "    Relationship status: None     Intimate partner violence:     Fear of current or ex partner: None     Emotionally abused: None     Physically abused: None     Forced sexual activity: None   Other Topics Concern     Parent/sibling w/ CABG, MI or angioplasty before 65F 55M? Yes   Social History Narrative    Balanced Diet - sometimes    Osteoporosis Prevention Measures - Dairy servings per day: 2 servings weekly    Regular Exercise -  Yes Describe walking 4 times a week    Dental Exam - NO    Seatbelts used - Yes    Self Breast Exam - Yes    Abuse: Current or Past (Physical, Sexual or Emotional)- No    Do you have any concerns about STD's -  No    Do you feel safe in your environment - No    Guns stored in the home - No    Sunscreen used - Yes    Lipids -  YES - Date: 053102    Glucose -  YES - Date: 012804    Eye Exam - YES - Date: one year ago    Colon Cancer Screening - No    Hemoccults - NO    Pap Test -  YES - Date: 070904, remote history of LEEP    Mammography - YES - Date: last spring, would like to discuss, needs a referral to [facility] [place]    DEXA - NO    Immunizations reviewed and up to date - Yes, last td given in 1997 or 1998       ROS:   Constitutional: No fever, chills, or sweats. Intermittent weight gain/loss   ENT: No visual disturbance, ear ache, epistaxis, sore throat  Allergies/Immunologic: Negative.   Respiratory: No cough, hemoptysia  Cardiovascular: As per HPI  GI: No nausea, vomiting, hematemesis, melena, or hematochezia  : No urinary frequency, dysuria, or hematuria  Integument: Negative  Psychiatric: Negative  Neuro: Negative  Endocrinology: Negative   Musculoskeletal: Negative    EXAM:  /87 (BP Location: Left arm, Patient Position: Chair, Cuff Size: Adult Large)   Pulse 76   Ht 1.6 m (5' 3\")   Wt 77.9 kg (171 lb 12.8 oz)   SpO2 100%   BMI 30.43 kg/m    In general, the patient is a pleasant female in no apparent distress.    HEENT: NC/AT.  LAWRENCE.  AYALA.  Sclerae " white, not injected.  Nares clear.  Pharynx without erythema or exudate.  Dentition intact.    Neck: No adenopathy.  No thyromegaly. Carotids +4/4 bilaterally without bruits.  No jugular venous distension.   Heart: RRR. Normal S1, S2 splits physiologically. No murmur, rub, click, or gallop. The PMI is in the 5th ICS in the midclavicular line. There is no heave.    Lungs: CTA.  No ronchi, wheezes, rales.  No dullness to percussion.   Abdomen: Soft, nontender, nondistended. No organomegaly.  No bruits.   Extremities: No clubbing, cyanosis, or edema.  The pulses are +4/4 at the radial, brachial, femoral, popliteal, DP, and PT sites bilaterally.  No bruits are noted.  Neurologic: Alert and oriented to person/place/time, normal speech, gait and affect  Skin: No petechiae, purpura or rash.      BP at the end of visit: 128/86     Labs:  LIPID RESULTS:  Lab Results   Component Value Date    CHOL 132 07/11/2019    HDL 40 (L) 07/11/2019    LDL 62 07/11/2019    TRIG 149 07/11/2019    CHOLHDLRATIO 3.5 07/29/2015    NHDL 92 07/11/2019       LIVER ENZYME RESULTS:  Lab Results   Component Value Date    AST 21 07/11/2019    ALT 34 07/11/2019       CBC RESULTS:  Lab Results   Component Value Date    WBC 12.5 (H) 03/06/2019    RBC 4.90 03/06/2019    HGB 12.5 03/06/2019    HCT 39.4 03/06/2019    MCV 80 03/06/2019    MCH 25.5 (L) 03/06/2019    MCHC 31.7 03/06/2019    RDW 13.8 03/06/2019     03/06/2019       BMP RESULTS:  Lab Results   Component Value Date     07/11/2019    POTASSIUM 3.9 07/11/2019    CHLORIDE 102 07/11/2019    CO2 26 07/11/2019    ANIONGAP 6 07/11/2019     (H) 07/11/2019    BUN 22 07/11/2019    CR 1.01 07/11/2019    GFRESTIMATED 63 07/11/2019    GFRESTBLACK 74 07/11/2019    KATHY 9.3 07/11/2019        A1C RESULTS:  Lab Results   Component Value Date    A1C 9.2 (H) 03/06/2019       INR RESULTS:  Lab Results   Component Value Date    INR 0.97 08/25/2016    INR 0.98 05/20/2015       Procedures:    Echocardiogram 07-11-19  Global and regional left ventricular function is normal with an EF of 55-60%.  Mild LVH.  Left ventricular diastolic function is indeterminate.  Right ventricular function, chamber size, wall motion, and thickness are  normal.  No significant valve pathology.  Normal estimated right atrial pressure.  Cannot estimate PA pressure.  No pericardial effusion.     There has been no change.  This study was compared with the study from 7/18/2018  _____________________________________________________________________________  __        Left Ventricle  Global and regional left ventricular function is normal with an EF of 55-60%.  Mild concentric wall thickening consistent with left ventricular hypertrophy  is present. Left ventricular diastolic function is indeterminate. No regional  wall motion abnormalities are seen.     Right Ventricle  Right ventricular function, chamber size, wall motion, and thickness are  normal.     Atria  The right atria appears normal. Mild left atrial enlargement is present.     Mitral Valve  Mitral leaflet thickness is normal . Trace mitral insufficiency is present.        Aortic Valve  Aortic valve is normal in structure and function. No significant dysfunction.  Not visualized well.     Tricuspid Valve  The tricuspid valve is normal. Trace tricuspid insufficiency is present. The  peak velocity of the tricuspid regurgitant jet is not obtainable. Pulmonary  artery systolic pressure cannot be assessed.     Pulmonic Valve  The pulmonic valve is normal. Trace pulmonic insufficiency is present.     Vessels  The aorta root is normal. The thoracic aorta is normal. The inferior vena cava  is normal. IVC diameter <2.1 cm collapsing >50% with sniff suggests a normal  RA pressure of 3 mmHg.     Pericardium  Prominent epicardial fat is noted. No pericardial effusion is present.        Compared to Previous Study  There has been no change. This study was compared with the study  from  2018 .  _____________________________________________________________________________  __  MMode/2D Measurements & Calculations  IVSd: 1.2 cm     LVIDd: 4.3 cm  LVIDs: 3.0 cm  LVPWd: 1.1 cm  FS: 29.3 %  LV mass(C)d: 170.1 grams  LV mass(C)dI: 96.3 grams/m2  Ao root diam: 2.8 cm  asc Aorta Diam: 2.8 cm  LVOT diam: 2.0 cm  LVOT area: 3.2 cm2  RWT: 0.51        Doppler Measurements & Calculations  MV E max ash: 66.0 cm/sec  MV A max ash: 85.9 cm/sec  MV E/A: 0.77  MV dec slope: 288.0 cm/sec2  MV dec time: 0.23 sec  E/E' av.8  Lateral E/e': 7.7  Medial E/e': 11.9          Assessment and Plan:   We discussed the results with patient:  We discussed the importance of a heart healthy diet and lifestyle.  We continue with same medical regimen.  Because of her swelling we asked her to check her weight in the morning and in the evening to sort out if there is real weight gain or not - the latter condition would be due distribution of fluid due to venodilation in lower extremities.       Please follow up: With Dr. Peace in one year with fasting labs prior and echocardiogram.       Milan Peace MD, PhD  Professor of Medicine  Division of Cardiology    CC  Patient Care Team:  Kash Solano MD as PCP - General (Family Practice)  Milan Peace MD as MD (Cardiology)  Majo Mckinnon MD as MD (Rheumatology)  Jas Vernon MD as MD (Ophthalmology)  Jas Vernon MD as Referring Physician (Ophthalmology)  Charis Holbrook MD as Resident (Ophthalmology)  Katia Pastor APRN CNP as Assigned PCP  Sangeetha Vasquez MD as MD (Otolaryngology)  DARLING THOMPSON

## 2019-07-12 ENCOUNTER — OFFICE VISIT (OUTPATIENT)
Dept: RHEUMATOLOGY | Facility: CLINIC | Age: 54
End: 2019-07-12
Attending: INTERNAL MEDICINE
Payer: COMMERCIAL

## 2019-07-12 VITALS
OXYGEN SATURATION: 98 % | HEIGHT: 63 IN | BODY MASS INDEX: 30.19 KG/M2 | WEIGHT: 170.4 LBS | SYSTOLIC BLOOD PRESSURE: 125 MMHG | DIASTOLIC BLOOD PRESSURE: 85 MMHG | HEART RATE: 83 BPM

## 2019-07-12 DIAGNOSIS — M31.30 WEGENER'S VASCULITIS: ICD-10-CM

## 2019-07-12 DIAGNOSIS — M79.89 ARM SWELLING: ICD-10-CM

## 2019-07-12 DIAGNOSIS — M31.30 WEGENER'S VASCULITIS: Primary | ICD-10-CM

## 2019-07-12 LAB
ALBUMIN UR-MCNC: NEGATIVE MG/DL
APPEARANCE UR: CLEAR
BASOPHILS # BLD AUTO: 0.1 10E9/L (ref 0–0.2)
BASOPHILS NFR BLD AUTO: 0.7 %
BILIRUB UR QL STRIP: NEGATIVE
COLOR UR AUTO: YELLOW
CREAT UR-MCNC: 194 MG/DL
CREAT UR-MCNC: 194 MG/DL
CRP SERPL-MCNC: <2.9 MG/L (ref 0–8)
DIFFERENTIAL METHOD BLD: ABNORMAL
EOSINOPHIL # BLD AUTO: 0.4 10E9/L (ref 0–0.7)
EOSINOPHIL NFR BLD AUTO: 4.2 %
ERYTHROCYTE [DISTWIDTH] IN BLOOD BY AUTOMATED COUNT: 13.5 % (ref 10–15)
ERYTHROCYTE [SEDIMENTATION RATE] IN BLOOD BY WESTERGREN METHOD: 10 MM/H (ref 0–30)
GLUCOSE UR STRIP-MCNC: NEGATIVE MG/DL
HCT VFR BLD AUTO: 40.6 % (ref 35–47)
HGB BLD-MCNC: 12.9 G/DL (ref 11.7–15.7)
HGB UR QL STRIP: NEGATIVE
IMM GRANULOCYTES # BLD: 0 10E9/L (ref 0–0.4)
IMM GRANULOCYTES NFR BLD: 0.4 %
KETONES UR STRIP-MCNC: NEGATIVE MG/DL
LEUKOCYTE ESTERASE UR QL STRIP: NEGATIVE
LYMPHOCYTES # BLD AUTO: 1.8 10E9/L (ref 0.8–5.3)
LYMPHOCYTES NFR BLD AUTO: 17 %
MCH RBC QN AUTO: 26.4 PG (ref 26.5–33)
MCHC RBC AUTO-ENTMCNC: 31.8 G/DL (ref 31.5–36.5)
MCV RBC AUTO: 83 FL (ref 78–100)
MONOCYTES # BLD AUTO: 0.8 10E9/L (ref 0–1.3)
MONOCYTES NFR BLD AUTO: 7.8 %
MUCOUS THREADS #/AREA URNS LPF: PRESENT /LPF
NEUTROPHILS # BLD AUTO: 7.3 10E9/L (ref 1.6–8.3)
NEUTROPHILS NFR BLD AUTO: 69.9 %
NITRATE UR QL: NEGATIVE
NRBC # BLD AUTO: 0 10*3/UL
NRBC BLD AUTO-RTO: 0 /100
PH UR STRIP: 6 PH (ref 5–7)
PLATELET # BLD AUTO: 341 10E9/L (ref 150–450)
PROT UR-MCNC: 0.26 G/L
PROT/CREAT 24H UR: 0.14 G/G CR (ref 0–0.2)
RBC # BLD AUTO: 4.89 10E12/L (ref 3.8–5.2)
RBC #/AREA URNS AUTO: 2 /HPF (ref 0–2)
SOURCE: ABNORMAL
SP GR UR STRIP: 1.02 (ref 1–1.03)
SQUAMOUS #/AREA URNS AUTO: 1 /HPF (ref 0–1)
UROBILINOGEN UR STRIP-MCNC: 0 MG/DL (ref 0–2)
WBC # BLD AUTO: 10.4 10E9/L (ref 4–11)
WBC #/AREA URNS AUTO: <1 /HPF (ref 0–5)

## 2019-07-12 PROCEDURE — 84156 ASSAY OF PROTEIN URINE: CPT | Performed by: INTERNAL MEDICINE

## 2019-07-12 PROCEDURE — G0463 HOSPITAL OUTPT CLINIC VISIT: HCPCS | Mod: ZF

## 2019-07-12 PROCEDURE — 36415 COLL VENOUS BLD VENIPUNCTURE: CPT | Performed by: INTERNAL MEDICINE

## 2019-07-12 PROCEDURE — 81001 URINALYSIS AUTO W/SCOPE: CPT | Performed by: INTERNAL MEDICINE

## 2019-07-12 PROCEDURE — 82784 ASSAY IGA/IGD/IGG/IGM EACH: CPT | Performed by: INTERNAL MEDICINE

## 2019-07-12 PROCEDURE — 86255 FLUORESCENT ANTIBODY SCREEN: CPT | Performed by: INTERNAL MEDICINE

## 2019-07-12 PROCEDURE — 83876 ASSAY MYELOPEROXIDASE: CPT | Performed by: INTERNAL MEDICINE

## 2019-07-12 PROCEDURE — 83516 IMMUNOASSAY NONANTIBODY: CPT | Performed by: INTERNAL MEDICINE

## 2019-07-12 PROCEDURE — 85652 RBC SED RATE AUTOMATED: CPT | Performed by: INTERNAL MEDICINE

## 2019-07-12 PROCEDURE — 86140 C-REACTIVE PROTEIN: CPT | Performed by: INTERNAL MEDICINE

## 2019-07-12 PROCEDURE — 85025 COMPLETE CBC W/AUTO DIFF WBC: CPT | Performed by: INTERNAL MEDICINE

## 2019-07-12 PROCEDURE — 86355 B CELLS TOTAL COUNT: CPT | Performed by: INTERNAL MEDICINE

## 2019-07-12 ASSESSMENT — PAIN SCALES - GENERAL: PAINLEVEL: SEVERE PAIN (6)

## 2019-07-12 ASSESSMENT — MIFFLIN-ST. JEOR: SCORE: 1347.06

## 2019-07-12 NOTE — RESULT ENCOUNTER NOTE
Janine,     I agree with the radiologist that your orbital process is still filling up a lot of the orbit. He also saw that your proptosis has gotten much better, which is what we saw on your clinic visit.  I will see you in about 6 months, but please call me sooner if you feel like you are getting worse.     Thank you for allowing me to share in your care.     Jas Vernon MD

## 2019-07-12 NOTE — LETTER
7/12/2019       RE: Janine Cornell  [address] [facility] [id number]     Dear Colleague,    Thank you for referring your patient, Janine Cornell, to the [facility] RHEUMATOLOGY at [facility]. Please see a copy of my visit note below.    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 Cornell is a 51 yo AAF who was referred to our clinic for evaluation and management of her joint pain in setting of positive RF 26.    Her joint symptoms first started more than a year ago, but over last 6-8 months fluctuating some good and bad days . All her joints are involved. Over last 5 months, hands became swollen, warm and painful. Tylenol does not help. Ketoprofen did not help. Was told to avoid NSAIDs given ACS. Reports AM/PM stiffness x 2-3 hr, can't make full fist when wakes up in AM.    She feels tired all the time. Reports worsening hair loss and unchanged  facial rash. Gets red sore flaky rash over cheeks across her face which is intermittent diagnosed Rosacea and is prescribed metronidazole gel which she has not started using. Reports occasional oral ulcers. Hands feel cold with red discoloration last winter. Has occasional dysphagia to both solids and liquid. Has dry eyes, is using OTC allergy eyedrops. Has chronic abdominal pain. Has h/o pancreatitis. Sometime has anxiety. Occasionally has numbness, tingling in fingers/toes. Has back and spine issues, her whole back and neck hurts, different areas at different time. She is wondering if she has FMS. Has hard time sleeping, has never been diagnosed with BRENNA. She does not know if he snores. She was found to have slightly positive RF in 2/2013. Has microcytic anemia.     Her arthralgia gets worse with drop in temperature. Reports body ache. Activity makes her pain  worse. Her joint swelling isintermittent. Her body pain and joint pain is the same. Reports AM stiffness x 3 hr.    She has been doing acupuncture x few months and it is helping with her pain. She thinks that the combination of plaquenil and acupuncture is helpful but overall she notice 30% in her symptoms relief.     Takes tylenol and tramadol on occasion for pain.    She decided to use different formulation of metformin which helped with her abdominal pain. I referred her to GI for evaluation of elevated lipase and abdominal pain. She decided to see GI in the future.     She is on  mg qd since 5/2014, tolerates it well and it helps her some. Denies taking any gabapentin due to chest pain and headches. She has had referral her for water aerobics, but she can't begin until she's healed from recent surgery in 10/2014. She thinks HCQ is helping but not enough to control all her pain, reports 2-3 hr of AM stiffness with ongoing diffuse arthralgia/myalgia along with intermittent swelling of hands. She does see her acupuncture person and it helps with her pain, has not started water aerobics yet. Has got her flu shot.     10/2015: Reports having pleuritic CP in 3/2015, was prescribed Lidoderm patches first. It did not help. Took prednisone (?dose) by her own, pain got better but it recurred off prednisone and gradually got worse to the point that she could not stand the pain anymore, was seen in ER in 5/2015, was treated with medrol dose pack which helped. Reports pain over hips, knees, ankles and fingers. Pain gets worse with walking. Reports myalgia, swelling of the arms. Pain over neck, shoulders. Has AM stiffness x 3-4 hr. Fingers swell up and become painful. Can't remember if steroid helped with joint pain. She had headaches, severe CP when she took tramadol and gabapentin and stopped taking them, sx resolved but she can't tell which one caused SEs but thinks that probably it was gabapentin. She has good days  and tries to be more active but activity aggravates the pain. Headaches are intermittent. HCQ helps some not enough to control all the pain. No HCQ SEs. Had eye exam around July 2015. Flexeril helps but PCP wants to change flexeril to zanaflex, reporting that she is NOT comfortable with the change. Acupuncture still helps an she continued to  do that. Concerned about fat pads she has in supraclavicular area, has h/o thyroid nodules. Continues having hair loss, takes iron for iron deficiency.     2/2016: She has 2 major complaints today, increased joint pain/swelling in hands/feet and recent Dx of optic neuritis in R eye, reports having similar problem about 20 yr ago, now recurred. Has a spot in R eye vision x long term, was seen by ophthalmology few days ago and was told to have optic neuritis. Gets joint pain, muscle pain. Can't  objects, hands are swollen. Knees, hips and ankles are painful. Reports more arthralgia. AM stiffness/ pain is 3-4 hr. She wants me to talk to her ophthalmologist directly.    She had botox inj for migraines which did not help her. She is going to have angiogram next wk, had to take more nitro for CP recently.     4/29/2016: She reports being on steroid taper x 3 since last visit. Reportedly, had orbit MRI, it showed swelling/inflamamtion of R lacrimal glands. She had painful swelling of her R eye, cause her headaches. Botox inj made her headaches worse. She is being dealing with this since 1/2016, with severe headaches and pain/swelling around R eye. Had biopsy in 3/2016. She is frustrated as prednisone causes her weight gain and her BG is difficult to control on it.    5/2016: At last visit, was prescribed MTX 10 mg po qwk to take along with FA 1 mg qd. She decided not to take MTX, is afraid of side effects, believes all these medications would harm her. She also believes that botox caused her inflammation around R eye. No major flare up of per-orbital inflamamtion since last visit  but continues to have swelling around her R eye.    11/2016: Reports diffuse body ache, arthralgia, myalgia especially with weather change. No major flare up of campos-orbital inflammation but her face/around R eye swells up from time to time. Reports 2 episodes of CP over past 2 mo, nitro sometimes helps and sometimes does not help. She has asthma and costochondritis and she has hard time to distinguish the origin of pain. Sometimes knees and fingers swell up. Her stiffness is sometimes all day.    4/2017:  Reports having sinusitis since last visit. Her R eye is dry and is using eyedrops to keep it moist. Reports having pain in ears. Was treated with antibiotics by PCP, it got better then it got worse. Reports catching infections easily. Then started having pressure over eyes with pain/headaches (exact start date is unknown). Pain gradually got worse and became persistent. Had sinus CT.     4/7/2017: Having a flare up of swelling, pain around her R orbit. Has not tried MTX yet.    5/2017: On MTX 10 mg po qwk since 4/7/2017, reports increased stomach pain and nausea and new headaches since start of MTX and it's not helping. Pain/swelling around R orbit is worse.    6/2017: She finished prednisone taper prescribed at last visit, R campos-orbital swelling significantly improved. 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    2/2018: R campos-orbital swelling has improved. Repeat R lacrimal gland biopsy in 8/2017 showed non specific inflammation with no lymphoma. She is off steroid. Did not tolerate AZA due to N/V and abdominal pain.    Is back with recurrence of R campos-orbital sweling x past 2 months. Pain really got worse over past 3wk, requries     9/2018: Declined ritiximab at last visit, lost f/u since 2/2018. Went to [place] and was seen on 8/29 by Dr. 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 Reportedly he ordered labs and recommended surgery since the inflammation of the R eye is back and is pushing the eye down. Janine took some prednsione 30 mg every day few days a go for pain.    3/2019: She had lateral orbitotomy and debulking orbital mass right eye on 9/26/18 with Dr. Shah and Dr. Valdez at [place]. 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 There were no complications according to the op note.    Janine finally agreed to receive rituximab for her GPA. She had it as 1 gr q2wk x 2 on 12/12/2018 and 12/26/2018. Had minor allergic reaction which was managed by IV solu cortef and benadryl. She is off prednisone about 6wk after surgery. Had bleeding ulcer from prednsione. Has pain over sinus, ears. Still has residual pain, swelling around her R eye is concerned about it. Wants to transfer her care to ophthalmology here as it's closer to her.    Cold induced sweating congestion joint pain  Facial swelling  allegy doctor clear ear pft ok doxy sinusitis    Today (7/12/19):  Patient completed her 2 rounds of rituximab in June. She tolerated well. She was recently seen by Dr. Vernon in Optho and repeat CT scan of orbits shows some decrease in proptosis. She reports that her R eye still intermittently feels puffy. She also mentions intermittent swelling in her arms. Unclear of any triggers. Limbs are also heavy with muscle aches and some joint pains. Exacerbated with activity.      Component      Latest Ref Rng 2/28/2013 2/28/2013          12:14 PM 12:14 PM   WBC      4.0 - 11.0 10e9/L     RBC Count      3.8 - 5.2 10e12/L     Hemoglobin      11.7 - 15.7 g/dL     Hematocrit      35.0 - 47.0 %     MCV      78 - 100 fl     MCH      26.5 - 33.0 pg     MCHC      31.5 - 36.5 g/dL     RDW      10.0 - 15.0 %     Platelet Count      150 - 450 10e9/L     Specimen Description           Lyme Screen IgG and IgM           Vitamin D Deficiency  screening      30 - 75 ug/L     Ferritin      10 - 300 ng/mL     Sed Rate      0 - 20 mm/h     ALLI Screen by EIA      <1.0     Rheumatoid Factor      0 - 14 IU/mL     CK Total      30 - 225 U/L  78   Uric Acid      2.5 - 7.5 mg/dL 6.7      Component      Latest Ref Rng 2/28/2013          12:14 PM   WBC      4.0 - 11.0 10e9/L    RBC Count      3.8 - 5.2 10e12/L    Hemoglobin      11.7 - 15.7 g/dL    Hematocrit      35.0 - 47.0 %    MCV      78 - 100 fl    MCH      26.5 - 33.0 pg    MCHC      31.5 - 36.5 g/dL    RDW      10.0 - 15.0 %    Platelet Count      150 - 450 10e9/L    Specimen Description       Serum   Lyme Screen IgG and IgM       Test value: <0.75....Interpretation: Negative....If you highly suspect Lyme . . .   Vitamin D Deficiency screening      30 - 75 ug/L    Ferritin      10 - 300 ng/mL    Sed Rate      0 - 20 mm/h    ALLI Screen by EIA      <1.0    Rheumatoid Factor      0 - 14 IU/mL    CK Total      30 - 225 U/L    Uric Acid      2.5 - 7.5 mg/dL      Component      Latest Ref Rng 2/28/2013 2/28/2013 2/28/2013 2/28/2013          12:14 PM 12:14 PM 12:14 PM 12:14 PM   WBC      4.0 - 11.0 10e9/L       RBC Count      3.8 - 5.2 10e12/L       Hemoglobin      11.7 - 15.7 g/dL       Hematocrit      35.0 - 47.0 %       MCV      78 - 100 fl       MCH      26.5 - 33.0 pg       MCHC      31.5 - 36.5 g/dL       RDW      10.0 - 15.0 %       Platelet Count      150 - 450 10e9/L       Specimen Description             Lyme Screen IgG and IgM             Vitamin D Deficiency screening      30 - 75 ug/L       Ferritin      10 - 300 ng/mL    10   Sed Rate      0 - 20 mm/h   23 (H)    ALLI Screen by EIA      <1.0  <1.0 . . .     Rheumatoid Factor      0 - 14 IU/mL 26 (H)      CK Total      30 - 225 U/L       Uric Acid      2.5 - 7.5 mg/dL         Component      Latest Ref Rng 2/28/2013 2/28/2013          12:14 PM 12:14 PM   WBC      4.0 - 11.0 10e9/L 14.1 (H)    RBC Count      3.8 - 5.2 10e12/L 4.55    Hemoglobin      11.7  - 15.7 g/dL 10.7 (L)    Hematocrit      35.0 - 47.0 % 33.3 (L)    MCV      78 - 100 fl 73 (L)    MCH      26.5 - 33.0 pg 23.5 (L)    MCHC      31.5 - 36.5 g/dL 32.1    RDW      10.0 - 15.0 % 18.1 (H)    Platelet Count      150 - 450 10e9/L 407    Specimen Description           Lyme Screen IgG and IgM           Vitamin D Deficiency screening      30 - 75 ug/L  32   Ferritin      10 - 300 ng/mL     Sed Rate      0 - 20 mm/h     ALLI Screen by EIA      <1.0     Rheumatoid Factor      0 - 14 IU/mL     CK Total      30 - 225 U/L     Uric Acid      2.5 - 7.5 mg/dL          MRI CERVICAL SPINE WITHOUT CONTRAST 4/3/2013 12:47 PM    HISTORY: Cervicalgia. Degeneration of cervical intervertebral disc.  MRI cervical spine. Evaluate bilateral supraclavicular lymph nodes.  Clinical enlargement.    TECHNIQUE: Multiplanar multisequence MRI of the cervical spine  without gadolinium contrast.    COMPARISON: None.    FINDINGS: The patient has a developmentally small central canal.  Images of the cervical cord reveals small areas of T2 hyperintensity  within the cervical cord. There is a small area of high signal  intensity at the C1 level. There is also a small area of high signal  intensity at the C6-C7 level. The area of high signal at C6-C7 is  located at an area of central spinal stenosis. This may be due to  myelomalacia. The area of high signal at C1-C2 is indeterminate.    C2-C3: Normal disc, facet joints, spinal canal and neural foramina.    C3-C4: Normal disc, facet joints, spinal canal and neural foramina.    C4-C5: Normal disc, facet joints, spinal canal and neural foramina.    C5-C6: There is degeneration of the disc. There is a mild annular  disc bulge. The central canal is mild-moderately narrowed. The  residual AP diameter of the central spinal canal measures  approximately 9 mm.    C6-C7: There is degeneration of the disc. There is loss of disc space  height. There is a diffuse annular disc bulge. There are  associated  posterior osteophytes. There are severe central spinal stenosis at  this level. The residual AP diameter of the central spinal canal is  only about 6 mm. There is significant flattening of the cord. There  is high signal in the cord at this level consistent with  myelomalacia. There is moderate to severe right foraminal stenosis  due to and uncinate spur.    C7-T1: Normal disc, facet joints, spinal canal and neural foramina.            Result Impression       IMPRESSION:  1. Severe central spinal stenosis at C6-C7 due to a developmentally  small canal and due to a bulging disc and associated posterior  osteophyte. There is flattening of the cord at this level and high  signal in the cord at this level consistent with myelomalacia.  2. There is a small, indeterminate 2-3 mm area of high signal  intensity in the cord at the C1 level. This could be due to gliosis  secondary to a previous infectious or inflammatory process.  Demyelinating disease could also have a similar appearance. Clinical  correlation suggested.    GAIL MORE MD     MRI LEFT UPPER EXTREMITY NON-JOINT WITHOUT CONTRAST, MRI RIGHT UPPER  EXTREMITY NON-JOINT WITHOUT CONTRAST April 3, 2013 1:32 PM    HISTORY: Cervicalgia. Degeneration of cervical intervertebral disc.    TECHNIQUE: Multiplanar, multisequence imaging of the brachial plexus  was performed without gadolinium contrast enhancement.    COMPARISON: None.    FINDINGS: No mass lesions are seen. No inflammatory process is  identified. The roots, trunks, branches, and divisions of both the  right and left brachial plexus appear normal. No adenopathy is seen.  The anterior scalene muscles and the middle scalene muscles appear  normal. Nodules are seen within the thyroid gland. The largest nodule  is seen in the left lobe of the thyroid. This measures about 1.8 cm  in diameter.            Result Impression       IMPRESSION:  1. Both the right and left brachial plexus regions appear  normal.  2. Thyroid nodules. The largest nodule is in the left lobe of the  thyroid. It measures about 1.8 cm in diameter.       XR WRIST BILATERAL G/E 3 VIEWS    Narrative:        EXAMINATION:  1. Each hand 3 views  2. Each wrist 3 views     DATE: 5/1/2013     HISTORY: Arthropathy with concern for rheumatoid.     FINDINGS: 3 views of each hand and 3 views of each wrist are obtained  without prior for comparison. Alignment is normal. There is no  fracture or acute bone abnormality. No distinct erosions are seen.  Some spurring of the distal radial ulnar joint is noted on the right.       Impression:     IMPRESSION:  1. No evidence of an inflammatory arthropathy involving either hand  or wrist.     VIELKA GUEVARA MD         XR FOOT BILATERAL G/E 3 VIEWS    Narrative:        EXAMINATION:  1. Each foot 3 views  2. Each ankle 3 views  3. Sacroiliac joint series     DATE: 5/1/2013     HISTORY: Arthropathy; concern for rheumatoid.     FINDINGS: No prior for comparison.     A frontal and bilateral oblique evaluation of the sacroiliac joints  shows no malalignment. Some patchy sclerosis is identified along the  central aspect of both sacroiliac joints, which is favored to be  degenerative. No distinct erosions are seen. The visualized portion  of the hip joint spaces appear maintained, with no erosive changes  identified. Mild endplate spurring is noted in the lower lumbar  spine.     3 views of each foot and 3 views of each ankle show no evidence of  acute fracture or dislocation. The metatarsal phalangeal,  tarsometatarsal and intertarsal joint spaces appear maintained. No  erosions are identified. There is no sign of acroosteolysis. The  ankle mortise and talar dome are intact bilaterally. Minimal spurring  is noted along the tip of the medial malleolus bilaterally.       Impression:     IMPRESSION:  1. Patchy sclerosis identified along the central aspect of both  sacroiliac joints, which is favored to be degenerative. No  distinct  erosions are seen.  2. No evidence of an inflammatory arthropathy in either foot or ankle.     VIELKA GUEVARA MD     5/2013  CBC WITH PLATELETS DIFFERENTIAL       Component Value Range    WBC 11.3 (*) 4.0 - 11.0 10e9/L    RBC Count 4.56  3.8 - 5.2 10e12/L    Hemoglobin 11.1 (*) 11.7 - 15.7 g/dL    Hematocrit 34.3 (*) 35.0 - 47.0 %    MCV 75 (*) 78 - 100 fl    MCH 24.3 (*) 26.5 - 33.0 pg    MCHC 32.4  31.5 - 36.5 g/dL    RDW 16.1 (*) 10.0 - 15.0 %    Platelet Count 315  150 - 450 10e9/L    Diff Method Automated Method      % Neutrophils 71.6  40 - 75 %    % Lymphocytes 20.9  20 - 48 %    % Monocytes 4.3  0 - 12 %    % Eosinophils 2.8  0 - 6 %    % Basophils 0.2  0 - 2 %    % Immature Granulocytes 0.2  0 - 0.4 %    Absolute Neutrophil 8.1  1.6 - 8.3 10e9/L    Absolute Lymphocytes 2.4  0.8 - 5.3 10e9/L    Absolute Monoctyes 0.5  0.0 - 1.3 10e9/L    Absolute Eosinophils 0.3  0.0 - 0.7 10e9/L    Absolute Basophils 0.0  0.0 - 0.2 10e9/L    Abs Immature Granulocytes 0.0  0 - 0.03 10e9/L   AMYLASE       Component Value Range    Amylase 103  30 - 110 U/L   COMPREHENSIVE METABOLIC PANEL       Component Value Range    Sodium 144  133 - 144 mmol/L    Potassium 3.8  3.4 - 5.3 mmol/L    Chloride 105  94 - 109 mmol/L    Carbon Dioxide 23  20 - 32 mmol/L    Anion Gap 17  6 - 17 mmol/L    Glucose 173 (*) 60 - 99 mg/dL    Urea Nitrogen 13  5 - 24 mg/dL    Creatinine 0.83  0.52 - 1.04 mg/dL    GFR Estimate 74  >60 mL/min/1.7m2    GFR Estimate If Black 90  >60 mL/min/1.7m2    Calcium 9.7  8.5 - 10.4 mg/dL    Bilirubin Total 0.4  0.2 - 1.3 mg/dL    Albumin 4.3  3.9 - 5.1 g/dL    Protein Total 7.8  6.8 - 8.8 g/dL    Alkaline Phosphatase 66  40 - 150 U/L    ALT 36  0 - 50 U/L    AST 28  0 - 45 U/L   CK TOTAL       Component Value Range    CK Total 66  30 - 225 U/L   CRP INFLAMMATION       Component Value Range    CRP Inflammation 10.4 (*) 0.0 - 8.0 mg/L   LIPASE       Component Value Range    Lipase 353 (*) 20 - 250 U/L    ERYTHROCYTE SEDIMENTATION RATE AUTO       Component Value Range    Sed Rate 26 (*) 0 - 20 mm/h   ROUTINE UA WITH MICROSCOPIC REFLEX TO CULTURE       Component Value Range    Color Urine Yellow      Appearance Urine Slightly Cloudy      Glucose Urine 30 (*) NEG mg/dL    Bilirubin Urine Negative  NEG    Ketones Urine 5 (*) NEG mg/dL    Specific Gravity Urine 1.026  1.003 - 1.035    Blood Urine Trace (*) NEG    pH Urine 5.0  5.0 - 7.0 pH    Protein Albumin Urine 10 (*) NEG mg/dL    Urobilinogen mg/dL Normal  0.0 - 2.0 mg/dL    Nitrite Urine Negative  NEG    Leukocyte Esterase Urine Negative  NEG    Source Midstream Urine      WBC Urine 1  0 - 2 /HPF    RBC Urine 4 (*) 0 - 2 /HPF    Squamous Epithelial /HPF Urine <1  0 - 1 /HPF    Mucous Urine Present (*) NEG /LPF    Hyaline Casts 3 (*) 0 - 2 /LPF    Calcium Oxalate Moderate (*) NEG /HPF   COMPLEMENT C3       Component Value Range    Complement C3 143  76 - 169 mg/dL   COMPLEMENT C4       Component Value Range    Complement C4 31  15 - 50 mg/dL   HEPATITIS C ANTIBODY       Component Value Range    Hepatitis C Antibody Negative  NEG   CARDIOLIPIN ANTIBODY IGG AND IGM       Component Value Range    Cardiolipin IgG Marline    0 - 15.0 GPL    Value: <15.0      Interpretation:  Negative    Cardiolipin IgM Marline    0 - 12.5 MPL    Value: <12.5      Interpretation:  Negative   LUPUS PANEL       Component Value Range    Lupus Result    NEG    Value: Negative      (Note)      COMMENTS:      The INR is normal.      APTT is normal.  1:2 Mix is not indicated.      DRVVT Screen is normal.      Thrombin time is normal.      NEGATIVE TEST; A LUPUS ANTICOAGULANT WAS NOT DETECTED IN THIS      SPECIMEN WITHIN THE LIMITS OF THE TESTING REPERTOIRE.      If the clinical picture is strongly suggestive of an antiphospholipid      syndrome, recommend anticardiolipin and beta-2-glycoprotein (IgG and      IgM) antibody tests.      Leela Franks M.D.  [phone]      5/2/2013            INR  =  0.93 N = 0.86-1.14  (No additional charge)      TT = 15.7 N = 13.0-19.0 sec  (No additional charge)            APTT'S:    Seconds      Reagent =  Stago LA      Normal  =  38      Patient  =  34      1:2 Mix  =  N/A      Reference =  31-43             DILUTE MADELINE VIPER VENOM TEST:      DRVVT Screen Ratio = 0.76 Normal = <1.21         IMMUNOGLOBULIN G SUBCLASSES       Component Value Range    IGG 1030  695 - 1620 mg/dL    IgG1 488  300 - 856 mg/dL    IgG2 325  158 - 761 mg/dL    IgG3 47  24 - 192 mg/dL    IgG4 18  11 - 86 mg/dL   SUNNY ANTIBODY PANEL       Component Value Range    Ribonucleic Protein IgG Antibody 0      Smith Antibody IgG 1      SSA (RO) Antibody IgG 4      SSB (LA) Antibody IgG 0      Scleroderma Antibody IgG 0     BETA 2 GLYCOPROTEIN ANTIBODIES IGG IGM       Component Value Range    Beta-2-Glycopro IgG 1      Beta-2-Glycopro IgM 5     CYCLIC CIT PEPT IGG       Component Value Range    Cyclic Cit Pept IgG/IgA    <20 UNITS    Value: <20      Interpretation:  Negative   DNA DOUBLE STRANDED ANTIBODIES       Component Value Range    DNA-ds    0 - 29 IU/mL    Value: <15      Interpretation:  Negative       CT CHEST PULMONARY EMBOLISM W CONTRAST 5/20/2015 4:57 PM  HISTORY: Pain. SOB. Elevated d-dimer.   TECHNIQUE: 65 mL Isovue 370. Axial images with coronal  reconstructions.  COMPARISON: None.  FINDINGS: Calcified granuloma with surrounding scarring in the  posterolateral left upper lobe. Triangular shaped opacity at the right  lung base in the lateral right middle lobe could represent some  scarring, atelectasis or infiltrate. There is also some scarring or  atelectasis in the posteromedial right lung base. Lungs otherwise  clear.  The pulmonary arteries are well opacified. No CT evidence for acute  pulmonary embolus. No aortic dissection.  Diffuse fatty infiltration of the liver.  IMPRESSION  IMPRESSION:   1. No pulmonary embolus identified.  2. Small focus of atelectasis, infiltrate or scarring in  the lateral  right middle lobe.  3. Old granulomatous disease.  4. Otherwise negative.  SILVERIO VAZQUEZ MD    Results for FAVIO MARTINEZ (MRN [id number]) as of 10/30/2015 17:00   Ref. Range 8/21/2012 09:06 5/22/2013 14:22 4/14/2014 12:06 9/11/2014 12:35 12/4/2014 16:38   TSH Latest Range: 0.40-4.00 mU/L 0.83 0.43 0.27 (L) 0.23 (L) 0.22 (L)     Component      Latest Ref Rng 10/30/2015   WBC      4.0 - 11.0 10e9/L 13.4 (H)   RBC Count      3.8 - 5.2 10e12/L 4.76   Hemoglobin      11.7 - 15.7 g/dL 12.4   Hematocrit      35.0 - 47.0 % 37.9   MCV      78 - 100 fl 80   MCH      26.5 - 33.0 pg 26.1 (L)   MCHC      31.5 - 36.5 g/dL 32.7   RDW      10.0 - 15.0 % 14.5   Platelet Count      150 - 450 10e9/L 324   Diff Method       Automated Method   % Neutrophils       67.7   % Lymphocytes       22.7   % Monocytes       6.3   % Eosinophils       2.7   % Basophils       0.4   % Immature Granulocytes       0.2   Absolute Neutrophil      1.6 - 8.3 10e9/L 9.1 (H)   Absolute Lymphocytes      0.8 - 5.3 10e9/L 3.1   Absolute Monoctyes      0.0 - 1.3 10e9/L 0.9   Absolute Eosinophils      0.0 - 0.7 10e9/L 0.4   Absolute Basophils      0.0 - 0.2 10e9/L 0.1   Abs Immature Granulocytes      0 - 0.4 10e9/L 0.0   Creatinine      0.52 - 1.04 mg/dL 1.21 (H)   GFR Estimate      >60 mL/min/1.7m2 47 (L)   GFR Estimate If Black      >60 mL/min/1.7m2 57 (L)   Iron      35 - 180 ug/dL 70   Iron Binding Cap      240 - 430 ug/dL 428   Iron Saturation Index      15 - 46 % 16   Albumin      3.4 - 5.0 g/dL 4.6   ALT      0 - 50 U/L 29   AST      0 - 45 U/L 21   CRP Inflammation      0.0 - 8.0 mg/L <2.9   Sed Rate      0 - 20 mm/h 8   Vitamin D Deficiency screening      20 - 75 ug/L 46   Ferritin      8 - 252 ng/mL 18   TSH      0.40 - 4.00 mU/L 0.49   T4 Free      0.76 - 1.46 ng/dL 1.06   Free T3      2.3 - 4.2 pg/mL 2.8     Component      Latest Ref Rng 11/17/2015   Testosterone Total      8 - 60 ng/dL 19   Sex Hormone Binding Globulin      30 - 135  nmol/L 32   Free Testosterone Calculated      0.11 - 0.58 ng/dL 0.34   Vitamin A       0.61   Retinol Palmitate       <0.02 . . .   Vitamin A Interp       Normal . . .   Thyroglobulin Antibody      <40 IU/mL <20   Thyroid Peroxidase Antibody      <35 IU/mL 31   DHEA Sulfate      35 - 430 ug/dL 101   Zinc       68     Component      Latest Ref Rng 1/27/2016   Sodium      133 - 144 mmol/L 135   Potassium      3.4 - 5.3 mmol/L 4.0   Chloride      94 - 109 mmol/L 104   Carbon Dioxide      20 - 32 mmol/L 24   Anion Gap      3 - 14 mmol/L 7   Glucose      70 - 99 mg/dL 88   Urea Nitrogen      7 - 30 mg/dL 20   Creatinine      0.52 - 1.04 mg/dL 1.13 (H)   GFR Estimate      >60 mL/min/1.7m2 51 (L)   GFR Estimate If Black      >60 mL/min/1.7m2 62   Calcium      8.5 - 10.1 mg/dL 9.4   Bilirubin Total      0.2 - 1.3 mg/dL 0.7   Albumin      3.4 - 5.0 g/dL 4.3   Protein Total      6.8 - 8.8 g/dL 7.7   Alkaline Phosphatase      40 - 150 U/L 66   ALT      0 - 50 U/L 24   AST      0 - 45 U/L 18   Cholesterol      <200 mg/dL 112   Triglycerides      <150 mg/dL 100   HDL Cholesterol      >49 mg/dL 34 (L)   LDL Cholesterol Calculated      <100 mg/dL 58   Non HDL Cholesterol      <130 mg/dL 78   N-Terminal Pro Bnp      0 - 125 pg/mL 29   Hemoglobin A1C      4.3 - 6.0 % 7.0 (H)   Amylase      30 - 110 U/L 60   Lipase      73 - 393 U/L 394 (H)   Troponin I ES      0.000 - 0.045 ug/L <0.015 . . .     Component      Latest Ref Rng 2/24/2016   Angiotensin Converting Enzyme       <5 (L) . . .   Neutrophil Cytoplasmic IgG Antibody       <1:20 . . .   Sed Rate      0 - 20 mm/h 86 (H)     Copath Report      Patient Name: FAVIO MARTINEZ   MR#: [id number]   Specimen #: [id number]   Collected: 3/15/2016   Received: 3/15/2016   Reported: 3/17/2016 13:33   Ordering Phy(s): PARVEEN ENRIQUEZ     ORIGINAL REPORT FOLLOWS ADDENDUM  ADDENDUM     TO ORIGINAL REPORT   Status: Signed Out   Date Ordered:3/18/2016   Date Complete:3/18/2016   Date  "Reported:3/18/2016 12:06   Signed Out By: Marianne Godfrey MD     INTERPRETATION:   This addendum is done to incorporate the results of fungal (GMS) stains   done on the specimen.  Specimen is negative for fungal organisms.  The   original final diagnosis remains unchanged.     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  Negative for malignancy(Please   see microscopic description)     Electronically signed out by:     Marianne Godfrey MD     CLINICAL HISTORY:   right orbital mass     GROSS:   The specimen is received labeled \"right orbital biopsy\" and consists of   red-tan nodule measuring 1.5 x 0.9 x 0.6 cm with one smooth side and   opposite rough side consistent with periosteum.  The specimen is   bisected revealing homogenous pale tan fleshy cut surface.  Touch   preparations are prepared, air dried and fixed, portion of specimen is   submitted in RPMI for possible lymphoma workup [facility],   ([facility], [place], [place] ).  The remainder is entirely submitted.   (Dictated by: Marianne Godfrey MD 3/15/2016 04:45 PM)     MICROSCOPIC:     Specimen consists of portion of lacrimal gland with acute and chronic   inflammation.  Focal area of microabscess formation associated with   small areas of necrosis are also present.  Inflammation is seen   extending to the periorbital adipose tissue forming panniculitis.   Specimen is negative for malignancy.  Samples sent for immunophenotyping   to [facility], ([facility], [place], [place] ) reveals no evidence   of monoclonality or aberrant antigen expression.  A GMS (fungal) stain   is pending and results will be reported as an addendum.     CPT Codes:   A: 10142-HQ0, 14157-LGGPI, SOH, 60845-QYRVI, 59464-SPPT     TESTING LAB LOCATION: "   [facility]   [address] [facility] [place]  [id number] [phone]     COLLECTION SITE:   Client: [facility]   Location: [facility] (S)            Component      Latest Ref Rng 4/29/2016   Nucleated RBCs      0 /100 0   Absolute Neutrophil      1.6 - 8.3 10e9/L 8.9 (H)   Absolute Lymphocytes      0.8 - 5.3 10e9/L 3.2   Absolute Monocytes      0.0 - 1.3 10e9/L 0.8   Absolute Eosinophils      0.0 - 0.7 10e9/L 0.2   Absolute Basophils      0.0 - 0.2 10e9/L 0.1   Abs Immature Granulocytes      0 - 0.4 10e9/L 0.1   Absolute Nucleated RBC       0.0   IGG      695 - 1620 mg/dL 836   IgG1      300 - 856 mg/dL 280 (L)   IgG2      158 - 761 mg/dL 277   IgG3      24 - 192 mg/dL 35   IgG4      11 - 86 mg/dL 16   RNP Antibody IgG      0.0 - 0.9 AI <0.2 . . .   Smith SUNNY Antibody IgG      0.0 - 0.9 AI <0.2 . . .   SSA (Ro) (SUNNY) Antibody, IgG      0.0 - 0.9 AI <0.2 . . .   SSB (La) (SUNNY) Antibody, IgG      0.0 - 0.9 AI <0.2 . . .   Scleroderma Antibody Scl-70 SUNNY IgG      0.0 - 0.9 AI <0.2 . . .   Cholesterol      <200 mg/dL 154   Triglycerides      <150 mg/dL 220 (H)   HDL Cholesterol      >49 mg/dL 42 (L)   LDL Cholesterol Calculated      <100 mg/dL 67   Non HDL Cholesterol      <130 mg/dL 111   M Tuberculosis Result      NEG Negative   M Tuberculosis Antigen Value       0.00   Albumin      3.4 - 5.0 g/dL 4.3   ALT      0 - 50 U/L 30   AST      0 - 45 U/L 10   Complement C3      76 - 169 mg/dL 157   Complement C4      15 - 50 mg/dL 32   CRP Inflammation      0.0 - 8.0 mg/L <2.9   Sed Rate      0 - 20 mm/h 2   DNA-ds      <10 IU/mL 1   Cyclic Citrullinated Peptide Antibody, IgG      <7 U/mL 1   Rheumatoid Factor      <20 IU/mL <20   Proteinase 3 Antibody IgG      0.0 - 0.9 AI <0.2 . . .   Myeloperoxidase Antibody IgG      0.0 - 0.9 AI 2.5 (H)   Vitamin D Deficiency screening      20 - 75 ug/L 24   Hemoglobin A1C      4.3 - 6.0 % 7.9 (H)   ALLI by IFA IgG       1:40 (A) . . .      Component      Latest Ref Rng & Units 4/7/2017   WBC      4.0 - 11.0 10e9/L 11.3 (H)   RBC Count      3.8 - 5.2 10e12/L 4.77   Hemoglobin      11.7 - 15.7 g/dL 12.5   Hematocrit      35.0 - 47.0 % 38.7   MCV      78 - 100 fl 81   MCH      26.5 - 33.0 pg 26.2 (L)   MCHC      31.5 - 36.5 g/dL 32.3   RDW      10.0 - 15.0 % 13.2   Platelet Count      150 - 450 10e9/L 347   Diff Method       Automated Method   % Neutrophils      % 67.1   % Lymphocytes      % 22.9   % Monocytes      % 6.2   % Eosinophils      % 3.0   % Basophils      % 0.4   % Immature Granulocytes      % 0.4   Nucleated RBCs      0 /100 0   Absolute Neutrophil      1.6 - 8.3 10e9/L 7.5   Absolute Lymphocytes      0.8 - 5.3 10e9/L 2.6   Absolute Monocytes      0.0 - 1.3 10e9/L 0.7   Absolute Eosinophils      0.0 - 0.7 10e9/L 0.3   Absolute Basophils      0.0 - 0.2 10e9/L 0.1   Abs Immature Granulocytes      0 - 0.4 10e9/L 0.1   Absolute Nucleated RBC       0.0   IGG      695 - 1620 mg/dL 962   IgG1      300 - 856 mg/dL Test sent to [facility]. See [facility]ISC.   IgG2      158 - 761 mg/dL Test sent to [facility]. See [facility]ISC.   IgG3      24 - 192 mg/dL Test sent to [facility]. See [facility].   IgG4      11 - 86 mg/dL Test sent to [facility]. See [facility].   Result       SEE NOTE . . .   Test Name       IGG SUBCLASSES   Send Outs Misc Test Code       07866   Send Outs Misc Test Specimen       Serum   Creatinine      0.52 - 1.04 mg/dL 1.20 (H)   GFR Estimate      >60 mL/min/1.7m2 47 (L)   GFR Estimate If Black      >60 mL/min/1.7m2 57 (L)   Creatinine Urine      mg/dL    Albumin Urine mg/L      mg/L    Albumin Urine mg/g Cr      0 - 25 mg/g Cr    Myeloperoxidase Antibody IgG      0.0 - 0.9 AI 2.9 (H)   Proteinase 3 Antibody IgG      0.0 - 0.9 AI <0.2 . . .   Neutrophil Cytoplasmic IgG Antibody       1:80 (A) . . .   Angiotensin Converting Enzyme       <5 (L) . . .   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      Comparison: MRI brain 5/29/2013      Technique:   Orbits: Axial and coronal T1-weighted, and coronal T2-weighted images  obtained without intravenous contrast. Post-intravenous contrast  (using gadolinium) sagittal FLAIR, were obtained with fat-saturation,  focused on the orbits.  Brain: Axial susceptibility-weighted and FLAIR sequences were obtained  of the whole brain without intravenous contrast, and postcontrast  axial T1-weighted images were obtained through the whole brain.   Contrast: 7.5mL Gadavist     Findings:  New asymmetric enlargement of the right lacrimal gland and enhancement  of the right lacrimal gland with surrounding ill-defined crescentic T2  hyperintense signal and enhancement within the right superior  superotemporal orbit, predominantly involving the extraconal space  with slight intraconal extension. No definite associated restricted  diffusion. No discrete extraocular muscle enlargement. Normal  symmetric optic nerve signal. Cavernous sinuses and orbital apices are  normal. Globes are normal.     Whole brain images are symmetric minimal leukoaraiosis and generalized  parenchymal volume loss. Ventricles are not enlarged. No intracranial  hemorrhage, mass effect, midline shift or abnormal extra axial fluid  collection. No abnormal foci of intracranial enhancement or restricted  diffusion. Paranasal sinuses and mastoid air cells are clear.            Impression:    New abnormal enlargement of the right lacrimal gland with  surrounding  ill-defined T2 hyperintense signal and enhancement centered in the  superotemporal right orbital fat, which may represent   infectious/inflammatory dacryadenitis, orbital pseudotumor, lymphoma,  carcinoma, sarcoidosis or IgG4 disease..     I have personally reviewed the examination and initial interpretation  and I agree with the findings.     PATRICIA BRANTLEY MD      Component      Latest Ref Rng & Units 6/1/2017   WBC      4.0 - 11.0 10e9/L 12.0 (H)   RBC Count      3.8 - 5.2 10e12/L 5.18   Hemoglobin      11.7 - 15.7 g/dL 13.8   Hematocrit      35.0 - 47.0 % 41.0   MCV      78 - 100 fl 79   MCH      26.5 - 33.0 pg 26.6   MCHC      31.5 - 36.5 g/dL 33.7   RDW      10.0 - 15.0 % 14.8   Platelet Count      150 - 450 10e9/L 322   Diff Method       Automated Method   % Neutrophils      % 76.7   % Lymphocytes      % 16.5   % Monocytes      % 4.6   % Eosinophils      % 1.9   % Basophils      % 0.3   Absolute Neutrophil      1.6 - 8.3 10e9/L 9.2 (H)   Absolute Lymphocytes      0.8 - 5.3 10e9/L 2.0   Absolute Monocytes      0.0 - 1.3 10e9/L 0.6   Absolute Eosinophils      0.0 - 0.7 10e9/L 0.2   Absolute Basophils      0.0 - 0.2 10e9/L 0.0   Color Urine       Yellow   Appearance Urine       Clear   Glucose Urine      NEG mg/dL Negative   Bilirubin Urine      NEG Negative   Ketones Urine      NEG mg/dL Negative   Specific Gravity Urine      1.003 - 1.035 1.010   pH Urine      5.0 - 7.0 pH 5.5   Protein Albumin Urine      NEG mg/dL Negative   Urobilinogen Urine      0.2 - 1.0 EU/dL 0.2   Nitrite Urine      NEG Negative   Blood Urine      NEG Negative   Leukocyte Esterase Urine      NEG Negative   Source       Midstream Urine   WBC Urine      0 - 2 /HPF O - 2   RBC Urine      0 - 2 /HPF O - 2   Squamous Epithelial /LPF Urine      FEW /LPF Few   Bacteria Urine      NEG /HPF Few (A)   Creatinine      0.52 - 1.04 mg/dL 1.19 (H)   GFR Estimate      >60 mL/min/1.7m2 48 (L)   GFR Estimate If Black      >60  "mL/min/1.7m2 58 (L)   Protein Random Urine      g/L <0.05   Protein Total Urine g/gr Creatinine      0 - 0.2 g/g Cr Unable to calculate due to low value   Myeloperoxidase Antibody IgG      0.0 - 0.9 AI 1.9 (H)   Proteinase 3 Antibody IgG      0.0 - 0.9 AI <0.2 . . .   ALT      0 - 50 U/L 29   AST      0 - 45 U/L 18   Albumin      3.4 - 5.0 g/dL 4.6   CRP Inflammation      0.0 - 8.0 mg/L 6.1   Sed Rate      0 - 30 mm/h 13   Creatinine Urine Random      mg/dL 54   Neutrophil Cytoplasmic IgG Antibody       <1:20 . . .   Creatinine Urine      mg/dL 54       Patient Name: FAVIO MARTINEZ   MR#: [id number]   Specimen #: [id number]   Collected: 8/4/2017   Received: 8/4/2017   Reported: 8/9/2017 16:19   Ordering Phy(s): CRISTHIAN FLORES     For improved result formatting, select 'View Enhanced Report Format'   under Linked Documents section.     SPECIMEN(S):   Eye, right lacrimal gland     FINAL DIAGNOSIS:   Eye, right, \"lacrimal gland\", biopsy   - Dense fibrosis and patchy inflammation   - No residual lacrimal gland seen     COMMENT:   Sections show tissue involved by dense fibrosis and patchy inflammation.   There is no morphologic or immunohistochemical evidence of lymphoma. By   separate report, concurrent flow cytometry studies ([id number]),   performed at the [facility], show polytypic B cells and no   aberrant immunophenotype on T cells. Immunohistochemical stains for IgG   and IgG-4 were performed, which provide no support for IgG-4-related   disease. Some of these changes may be related to prior surgery. Sjögren   disease is not excluded. There is no evidence of malignancy. Dr. Max Conway has reviewed this case and concurs.     Electronically signed out by:     Antonella Beth M.D.   INDICATION:  Right eye edema. Reported history of right orbital biopsy yielding pathology consistent with idiopathic inflammation.    TECHNIQUE:  Noncontrast CT images were obtained through the brain and facial " bones.    COMPARISON:  CT sinus 03/27/2017, MRI brain and orbits 02/15/2016.     FINDINGS:  CT facial bones:   Large soft tissue lesion centered within the lateral intraconal and extraconal right orbit has significantly increased in size compared to the CT dated 03/27/2017. The lesion is inseparable from the right superior, lateral, and inferior rectus muscles, as well as the right lacrimal gland. The lesion demonstrates extension posteriorly slightly proximal to the orbital apex, as well as extension into the medial orbit superiorly and anteriorly. Mass effect includes medial bowing of the optic nerve sheath complex and pronounced proptosis of the right globe.  No mass is identified in the right orbit.  Torus palatinus.   Minimal mucosal thickening in the ethmoid air cells. The remainder of the visualized paranasal sinuses are clear.  CT brain:  The ventricles and sulci within normal limits for patient age. No mass effect or midline shift. The gray-white differentiation is maintained.  No acute intracranial hemorrhage or pathologic extra-axial fluid collection.  The calvarium is intact. The mastoid air cells are clear.    IMPRESSION:  1. Large soft tissue lesion centered within the lateral intraconal and extraconal right orbit has significantly increased in size compared to the CT dated 03/27/2017. The lesion is inseparable from the right lacrimal gland and rectus musculature. Mass effect includes medial bowing of the optic nerve sheath complex and pronounced proptosis of the right globe. Given provided history, findings may represent a progressive inflammatory etiology (pseudotumor). Other differential considerations, such as sarcoidosis or lymphoma, could also have this appearance.  2. No acute intracranial hemorrhage or intracranial mass effect.    Please note that all CT scans at this facility use dose modulation, iterative reconstruction, and/or weight-based dosing when appropriate to reduce radiation dose to  as low as reasonably achievable.    Dictated by Charles Ward MD @ Jul 16 2018  3:54PM    Signed by Dr. Charles Ward @ Jul 16 2018  4:20PM    CT ORBITAL WO CONTRAST 7/11/2019 3:42 PM     History: orbital pseudotumor; Subjective visual disturbance; Visual  field defect; Idiopathic orbital inflammatory syndrome  ICD-10: Subjective visual disturbance; Visual field defect; Idiopathic  orbital inflammatory syndrome     Comparison: CT 3/6/2019 and 7/16/2018, MRI brain 5/9/2017                                                                   Impression:   1. 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  2. Slightly decreased right orbital proptosis since 3/6/2019.      ROS:  A comprehensive ROS was done, positives are per HPI.        HISTORY REVIEW:  Past Medical History:   Diagnosis Date     Abnormal glandular Papanicolaou smear of cervix 1992     Allergic rhinitis     Allergy, airborne subst     Arthritis      ASCVD (arteriosclerotic cardiovascular disease)      Chronic pain      Chronic pancreatitis (H)     idiopathic, Tx: PPI, antioxidants, pancreatic enzymes     Common migraine      Coronary artery disease      Costochondritis      Difficulty in walking(719.7)      Dyspnea on exertion      Ectasia, mammary duct     [facility], persistent nipple discharge     Elevated fasting glucose      Gastro-oesophageal reflux disease      Hernia      History of angina      Hyperlipidemia LDL goal < 100      Hypertension goal BP (blood pressure) < 140/90     Essential hypertension     Iron deficiency anemia      Ischemic cardiomyopathy      Menorrhagia      Migraine headaches      Mild persistent asthma      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 Matias, neg w/u, Rx'd w/prednisone     Ventral hernia, unspecified, without mention of obstruction or gangrene 2012     Past Surgical History:   Procedure Laterality Date     C ECHO HEART XTHORACIC,COMPLETE, W/O DOPPLER  04    [facility] WNL, EF 60%     C/SECTION, LOW TRANSVERSE           CARDIAC SURGERY      cardiac stent- recent in 16; 4 other stents     DILATION AND CURETTAGE N/A 2016    Procedure: DILATION AND CURETTAGE;  Surgeon: Nahed Butler MD;  Location: [facility]     HC UGI ENDOSCOPY W EUS  08    Dr. Pastrana, possible chronic pancreatitis, fatty liver     HERNIA REPAIR  2012    [place] [facility] [place]     INSERT INTRAUTERINE DEVICE N/A 2016    Procedure: INSERT INTRAUTERINE DEVICE;  Surgeon: Nahed Butler MD;  Location: [facility]     INT UTERINE FIBRIOD EMBOLIZATION  10/29/2014     LAPAROSCOPIC CHOLECYSTECTOMY  08    Dr. Arnol GRUBBS TX, CERVICAL      s/p LEEP     ORBITOTOMY Right 3/15/2016    Procedure: ORBITOTOMY;  Surgeon: Myron Cyr MD;  Location:  [facility]     ORBITOTOMY Right 2017    Procedure: ORBITOTOMY;  RIGHT ORBITOTOMY AND BIOPSY;  Surgeon: Charis Holbrook MD;  [facility]     REPAIR PTOSIS Right 2017    Procedure: REPAIR PTOSIS;  RIGHT UPPER LID PTOSIS REPAIR;  Surgeon: Myron Cyr MD;  Location: [facility]     UPPER GI ENDOSCOPY  10/21/08    mild gastritis,   [place]     Family History   Problem Relation Age of Onset     Heart Disease Father 50        heart attack     Cerebrovascular Disease Father      Diabetes Father      Hypertension Father      Depression Father      C.A.D. Father      Hypertension Mother      Arthritis Mother      Heart Disease Mother         long qt syndrome     Thyroid Disease Mother      C.A.D. Mother      Heart Disease Brother 15        MI at 15, 16.      Diabetes Maternal Uncle      Hypertension Maternal Aunt      Hypertension Maternal Uncle      Arthritis Brother         he passed away, had severe arthritis at age 11     Arthritis Maternal Uncle      Eye Disorder Maternal Uncle         cataracts     Neurologic Disorder Sister         migraines     Neurologic Disorder Sister         migraines     Respiratory Son         asthma     Cerebrovascular Disease Maternal Uncle      C.A.D. Brother      Family History Negative Other         neg for RA, SLE     Unknown/Adopted No family hx of         unknown neurological issues in her family, mother was adopted     Skin Cancer No family hx of         No known family hx of skin cancer     Social History     Socioeconomic History     Marital status: Single     Spouse name: Not on file     Number of children: 1     Years of education: Not on file     Highest education level: Not on file   Occupational History     Employer: NONE    Social Needs     Financial resource strain: Not on file     Food insecurity:     Worry: Not on file     Inability: Not on file     Transportation needs:     Medical: Not on file     Non-medical: Not on file   Tobacco Use     Smoking status: Current Every Day Smoker     Packs/day: 0.20     Years: 1.00     Pack years: 0.20     Types: Cigarettes     Last attempt to quit: 2016     Years since quitting: 3.4     Smokeless tobacco: Never Used   Substance and Sexual Activity     Alcohol use: No     Alcohol/week: 0.0 oz     Drug use: No     Sexual activity: Not Currently    Lifestyle     Physical activity:     Days per week: Not on file     Minutes per session: Not on file     Stress: Not on file   Relationships     Social connections:     Talks on phone: Not on file     Gets together: Not on file     Attends Buddhism service: Not on file     Active member of club or organization: Not on file     Attends meetings of clubs or organizations: Not on file     Relationship status: Not on file     Intimate partner violence:     Fear of current or ex partner: Not on file     Emotionally abused: Not on file     Physically abused: Not on file     Forced sexual activity: Not on file   Other Topics Concern     Parent/sibling w/ CABG, MI or angioplasty before 65F 55M? Yes   Social History Narrative    Balanced Diet - sometimes    Osteoporosis Prevention Measures - Dairy servings per day: 2 servings weekly    Regular Exercise -  Yes Describe walking 4 times a week    Dental Exam - NO    Seatbelts used - Yes    Self Breast Exam - Yes    Abuse: Current or Past (Physical, Sexual or Emotional)- No    Do you have any concerns about STD's -  No    Do you feel safe in your environment - No    Guns stored in the home - No    Sunscreen used - Yes    Lipids -  YES - Date: 053102    Glucose -  YES - Date: [id number]    Eye Exam - YES - Date: one year ago    Colon Cancer Screening - No    Hemoccults - NO    Pap Test -  YES - Date: 070904, remote history of LEEP    Mammography - YES - Date: last spring, would like to discuss, needs a referral to [facility]    DEXA - NO    Immunizations reviewed and up to date - Yes, last td given in 1997 or 1998     Patient Active Problem List   Diagnosis     Seasonal affective disorder (H)     Allergic rhinitis     PCOS (polycystic ovarian syndrome)     Moderate persistent asthma     Chronic pancreatitis (H)     Hypertension goal BP (blood pressure) < 140/90     Common migraine     NSTEMI (non-ST elevated myocardial infarction) (H)     Hyperlipidemia LDL goal  <70     ASCVD (arteriosclerotic cardiovascular disease)     GERD (gastroesophageal reflux disease)     Ischemic cardiomyopathy     Hypertensive heart disease     Uterine leiomyoma     Iron deficiency anemia     Costochondritis     Vitamin D deficiency     Breast cancer screening     Spinal stenosis in cervical region     Fibromyalgia     Seronegative rheumatoid arthritis (H)     Type 2 diabetes, HbA1C goal < 8% (H)     Type 2 diabetes mellitus with other specified complication (H)     Hyperlipidemia associated with type 2 diabetes mellitus (H)     Hypertension associated with diabetes (H)     Overweight with body mass index (BMI) 25.0-29.9     Tobacco use disorder     Telogen effluvium     CAD S/P percutaneous coronary angioplasty     Status post coronary angiogram     ANCA-associated vasculitis (H)     Wegener's vasculitis (H)     Type 1 diabetes mellitus with complications (H)       Pregnancy Hx: She is . All misscarriages were in first trimester. H/o OC use in the past. 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    PMHx, FHx, SHx were reviewed, unchanged.      Outpatient Encounter Medications as of 2019   Medication Sig Dispense Refill     acetaminophen (TYLENOL) 325 MG tablet Take 1-2 tablets (325-650 mg) by mouth every 6 hours as needed for pain (headache) 250 tablet 0     albuterol (2.5 MG/3ML) 0.083% neb solution INL 1 VIAL VIA NEBULIZATION Q 4 TO 6 HOURS PRN  1     albuterol (PROAIR HFA, PROVENTIL HFA, VENTOLIN HFA) 108 (90 BASE) MCG/ACT inhaler Inhale 2 puffs into the lungs every 6 hours as needed for shortness of breath / dyspnea or wheezing 3 Inhaler 1     ASPIRIN LOW DOSE 81 MG EC tablet Take 1 tablet (81 mg) by mouth daily 30 tablet 11     BASAGLAR 100 UNIT/ML injection Inject 40 Units Subcutaneous daily 12 mL 2     blood glucose monitoring (NO BRAND SPECIFIED) meter device kit Use to test blood sugar 4 X times daily or as directed. (Patient taking differently: 1 kit Use to test  blood sugar 4 X times daily or as directed.) 1 kit 0     blood glucose monitoring (NO BRAND SPECIFIED) test strip 1 strip by In Vitro route 4 times daily Test as directed. Please dispense three months and three months of refills. Thank you. (Patient taking differently: 1 strip by In Vitro route 4 times daily Test as directed. Please dispense three months and three months of refills. Thank you.) 360 each 3     Blood Pressure Monitor KIT 1 each daily Monitor home blood pressure as instructed by physician.  Dispense [place] blood pressure kit. 1 kit 0     calcium carbonate (TUMS) 500 MG chewable tablet Take 3-4 chew tab by mouth daily as needed.       clopidogrel (PLAVIX) 75 MG tablet Take 1 tablet (75 mg) by mouth daily 30 tablet 11     COMPRESSION STOCKINGS 2 each daily Apply one 10-15 mmHg compression stocking to each lower extgmierty during the day and remove before bedtime. 4 each 2     cyclobenzaprine (FLEXERIL) 10 MG tablet Take 1 tablet (10 mg) by mouth 2 times daily as needed for muscle spasms 60 tablet 2     cycloSPORINE (RESTASIS) 0.05 % ophthalmic emulsion Place 1 drop into both eyes 2 times daily 60 each 11     cycloSPORINE (RESTASIS) 0.05 % ophthalmic emulsion Place 1 drop into the right eye every 12 hours       desonide (DESOWEN) 0.05 % cream Apply topically 2 times daily       dicyclomine (BENTYL) 20 MG tablet TAKE 1 TABLET(20 MG) BY MOUTH FOUR TIMES DAILY AS NEEDED 60 tablet 3     diphenhydrAMINE (BENADRYL) 25 MG capsule TK 1 TO 2 CS PO QHS  4     EPIPEN 2-RIKY 0.3 MG/0.3ML injection INJECT 0.3 MG INTO THE MUSCLE PRF ANAPHYALAXIS  0     ferrous gluconate (FERGON) 324 (38 Fe) MG tablet Take 1 tablet (324 mg) by mouth 2 times daily (with meals) 180 tablet 3     fexofenadine (ALLEGRA) 180 MG tablet Take 1 tablet by mouth daily as needed. 90 tablet 3     fluocinolone (SYNALAR) 0.01 % solution Apply topically daily as needed       fluticasone-vilanterol (BREO ELLIPTA) 100-25 MCG/INH inhaler Inhale 1 puff into  the lungs daily       folic acid (FOLVITE) 1 MG tablet Take 1 tablet by mouth daily 90 tablet 3     hydroxychloroquine (PLAQUENIL) 200 MG tablet Take 2 tablets (400 mg) by mouth daily 180 tablet 1     insulin pen needle (BD MARCK U/F) 32G X 4 MM USE DAILY OR AS DIRECTED 300 each 3     ketoconazole (NIZORAL) 2 % shampoo Apply topically daily as needed       lisinopril-hydrochlorothiazide (PRINZIDE/ZESTORETIC) 20-25 MG tablet Take 1 tablet by mouth daily 30 tablet 11     Magnesium Oxide -Mg Supplement 250 MG TABS TK 1 T PO BID. REDUCE IF STOOLS LOOSEN  11     metFORMIN (GLUCOPHAGE-XR) 500 MG 24 hr tablet TAKE 2 TABLETS(1000 MG) BY MOUTH DAILY WITH DINNER 180 tablet 0     metoclopramide (REGLAN) 10 MG tablet Take 1 tablet (10 mg) by mouth 4 times daily (before meals and nightly) 90 tablet 0     metoprolol succinate ER (TOPROL-XL) 100 MG 24 hr tablet Take 1 tablet (100 mg) by mouth daily 30 tablet 11     metroNIDAZOLE (NORITATE) 1 % cream Apply topically daily       montelukast (SINGULAIR) 10 MG tablet Take 1 tablet (10 mg) by mouth At Bedtime 30 tablet 1     Multiple Vitamin (DAILY-GERALD) TABS Take 1 tablet by mouth daily  0     nitroGLYcerin (NITROSTAT) 0.4 MG sublingual tablet Place 1 tablet (0.4 mg) under the tongue every 5 minutes as needed for chest pain 25 tablet 1     nystatin (MYCOSTATIN) 819940 UNITS TABS tablet TK 2 TS PO BID  0     ondansetron (ZOFRAN-ODT) 8 MG ODT tab Take 1 tablet (8 mg) by mouth every 8 hours as needed for nausea 60 tablet 1     pantoprazole (PROTONIX) 40 MG EC tablet Take 1 tablet (40 mg) by mouth daily Pork free tablets. 30 tablet 1     pravastatin (PRAVACHOL) 40 MG tablet Take 1 tablet (40 mg) by mouth daily 90 tablet 2     ranitidine (ZANTAC) 150 MG tablet Take 1 tablet (150 mg) by mouth 2 times daily 180 tablet 1     spironolactone (ALDACTONE) 50 MG tablet Take 1 tablet (50 mg) by mouth daily . Take additional 0.5 tablets by mouth once daily as needed for weight gain. 90 tablet 2      "sucralfate (CARAFATE) 1 GM tablet Take 1 tablet (1 g) by mouth 4 times daily 120 tablet 3     traMADol (ULTRAM) 50 MG tablet Take 1 tablet (50 mg) by mouth every 8 hours as needed for moderate pain 60 tablet 3     Triamcinolone Acetonide (NASACORT ALLERGY 24HR NA)        vitamin D (ERGOCALCIFEROL) 86581 UNIT capsule Take 1 capsule (50,000 Units) by mouth every 7 days Need a Vitamin D level in 2 months. (Patient taking differently: Take 50,000 Units by mouth every 7 days Need a Vitamin D level in 2 months.) 8 capsule 0     lifitegrast (XIIDRA) 5 % opthalmic solution Place 1 drop into both eyes 2 times daily (Patient not taking: Reported on 6/3/2019) 20 each 3     [] order for DME 1 Device once for 1 dose Equipment being ordered: Wedge pillow 1 each 0     No facility-administered encounter medications on file as of 2019.        Allergies   Allergen Reactions     Amoxicillin-Pot Clavulanate      Augmentin      Unknown possible hives and edema     Azithromycin      Diatrizoate Other (See Comments)     Pt wants this listed because she is allergic to shellfish      Imitrex [Sumatriptan]      Severe face/neck/chest tightness and flushing side effects      Penicillins Hives     Unknown      Pork Allergy      Stomach pain, cramping, diarrhea, itching, nausea and headaches     Shellfish Allergy Hives and Swelling     Sulfa Drugs Hives and Swelling     Zithromax [Azithromycin Dihydrate] Swelling     Unknown          Ph.E:    Vitals:    19 1430 19 1434   BP: 156/81 125/85   Pulse: 83    SpO2: 98%    Weight: 77.3 kg (170 lb 6.4 oz)    Height: 1.6 m (5' 3\")            Constitutional: WD/WN. Pleasant. In no acute distress.   Eyes: Swelling around R eye significantly improved since last visit. EOMI. Mild fullness still appreciable on R globe.  HEENT: No oral ulcers or thrush. Normal salivary pool.  Lymph: No cervical, supraclavicular, or axillary LAD.  Chest: Clear to auscultation bilaterally, Normal work of " breathing.  CV: RRR, no murmurs/ rubs or gallops. No edema, clubbing or cyanosis.   GI: Abdomen is soft and non tender.  MS: No synovitis or joint tenderness. Cool joints. No joint deformities. Full ROM of the joints. No nodules.   Skin: No rashes or lesions noted. No malar rash.  Neuro: A&O x 3. Grossly non focal, muscular power 5/5 in all ext  Psych: NL affect and mood    Assessment/ plan:    1-Seropositive non-erosive RA (arthritis, AM stiffness, high CRP, RF 26 but re-check neg 3/2015 on HCQ) Dx 5/2013, FMS, new pleuritic CP 3/20-15-5/2015 resolved on steroids. GPA. She is on  mg bid since 5/2014. She tolerates it well and it's helping some but not enough to control all her pain. Continues having flare ups of joint pain, swelling also has FMS. Joint sx are getting worse. Had high ESR/CRP in 3/2015 and new pleuritic CP between 3/2015-5/2015 which resolved after taking medrol dose pack prescribed 5/20/2015. Her pleuritic CP could be related to her RA. Then stopped tramadol as it blames it for recent episodes of CP.    In the past, MTX was recommended, she declined.    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 Etiology of mass unclear, but it's inflammatory and related to pt's underlying autoimmune disease (inflammatory arthritis, serositis). 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    Her work up showed borderline + ALLI and slightly elevated MPO. I told her prednisone is not good for her diabetes and weight gain. Recommended a trial of MTX as next step. Discussed risks on details. I called her neuro-ophthalmologist at last visit who agreed with trial of MTX (she suspects pseudo-sarcoidosis or sarcoid like disease but no granuloma and ACE not elevated).     Unfortunately despite  all my efforts to explain risks vs benefits (more than risks) of MTX as steroid sparing gent, Janine declined to try MTX. I was very concerned about her R orbital inflammatory mass as there is high chance of flare up off steroids and steroid causes her weigh gain and uncontrolled diabetes. I even offered her other steroid sparing gents like imuran. She declined that as well.    Her inflammation around R orbit has recurred now. On 4/7/2017, I spent quite amount of time discussing a trial of MTX. After discussing risks/benefits, she agreed to try it.     She is s/p Tx with MTX 10 mg po qwk 4/2017-5/2017. No benefits and more GI SEs, more hair loss and headaches since start of MTX. No benefits. I called and spoke with her neuro-ophthalmologist who recommended a second opinion from neuro-ophthalmology at the . I suspect her presentation to be ANCA vasculitis related but wanted to repeat imaging and get neuro-ophthalmology eval here. She was recommended to have repeat biopsy to r/o lymphoma.    In 5/2017, prescribed her prednisone 40-30-20-10 mg a day each for 3 days then stop (she declined higher dose and longer taper despite my concern for worsening swelling around orbit), Her R campos-orbital edema significantly improved. MTX was stopped in 5/2017 given side effects. Plan was to start imuran 50 mg po qd (NL TPMT); however we decided to hold off till she gets biopsy done.    Repeat R lacrimal gland biopsy in 8/2017 showed non specific inflammation, it ruled out lymphoma. Her R orbital swelling is improved but still present. After her biopsy I contacted her to schedule a f/u visit with me to discuss plan of care but she declined till today's visit.    She did not tolerate AZA because of GI SEs.    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     This is definitely an inflammatory process and is steroid responsive, she had local kenalog inj in 8/2017 at the time of biopsy which has helped. Given her diabetes and long term SE of prednisone, highly recommend steroid sparing agent to prevent progression/recurrence of R campos-orbital swelling. Since she did not tolerate MTX and AZA, recommend rituximab as next step.     In 2/2018, I spent 30 minutes discussing test results, plan of care, rituximab SEs including rare risk of PML. She declined rituximab with concern for SEs.    Unfortunately lost f/u sine 2/2018. In 9/2018, was back with her R eye being much worse, swelling around R orbit was severe and is pushing down her eye. She decided to see an ophthalmologist at [place], was seen 8/29, was told to have GPA.    Janine is frustrated with her eye condition, saying nobody told her that she has GPA or Wegener's which is a serious condition and people could die from it.    I reminded her that I discussed the Dx of ANCA-vasculitis which is just another name for Wegener's with her many times but she always declined induction Tx rituximab at last visit in 9/2018. I put her on prednisone 30 mg every day in 9/2018, now she is off, stopped 6 wk after surgery. Does not like SEs including worsening BG.    CT chest/abdomen/pelvis 4/2017 was neg for malignancy. 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 Repeat MPO was positive in 6/2017.    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 Shah and Dr. Valdez at [place]. 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 Not happy with results, on my exam, her eye swelling/pain significantly improved. She wants to transfer care to here, I advised her to make an appointment with Dr. Saulo GREEN for f/u and referred her to Dr. Vasquez to assess if she has sinus involvement by GPA given facial pain.    After my original recommendation to receive rituximab (FDA approved  for GPA) in 2/2018. She finally agreed to it, had 1 gr q2wk x 2 on 12/12/2018 and 12/26/2018. Had minor allergic reaction which was managed by extra dose of steroid and benadryl. She refuses to do prednisone taper given h/o diabetes, GIB on prednisone. I told her it would take 1 mo for rituximab to show benefit, if she continues to have active disease, recommend trial of cytoxan.     Patient received Rituxan again (6/3/19 & 6/17/19) 6 months after first round. Repeat CT scan show continued inflammatory changes in the R orbit, but decreased proptosis. We discussed cytoxan again but she said it is out of question and she declined considering it. Therefore, will continue with q6mo rituximab with hope that inflammation goes down. Patient is not on any prednisone. Headaches have improved since surgery and rituximab treatment.  - Continue q6 month Rituximab infusions. Next due in December, 2019.  - Immunoglobulins, vasculitis labs, and rituximab monitoring labs ordered.    Continue accupuncture (referral was placed as it helps with chronic pain).     My impression is that her arthralgias are a combination of IA, fibromyalgia and chronic pain.  Stopped HCQ at last visit, shortly after resumed taking it as arthralgia recurred. Continue HCQ. Eye exam is updated.    2-Fad pads. She was seen by endocrinology and cushing was ruled out in 4/2014. Was advised to do f/u for enlarged thyroid/thyroid nodules.    3-Hair loss. F/U with dermatology    4-Chronic pain. F/U with pain clinic    5-Concerns of subclinical hyperthyroidism: TSH is barely minimal, chart review shows that those lowest levels are since April 2014. At last visit, discussed Endocrinology ref which pt wants to hold off in this visit.     6-Vit D deficiency. Was replaced with vit D 50, 000 units po qwk x 12 wk then 2000 units every day    7-Upper extremity swelling. Recent mammogram without suggestion of malignancy. No LAD of axillary region. Arms appear normal on  physical exam, however patient reports intermittent swelling.  - Referral to lymphedema clinic.      PLAN: Follow up in 4-5 months.    MEDICATION CHANGES: none. Plan to repeat rituximab in 12/2019    Patient was seen and plan formulated with staff rheumatologist, Dr. Mckinnon.    Sadi Lee  Internal Medicine, PGY3      Attending Note: I saw and evaluated the patient with Dr. Lee. I agree with the assessment and plan.    Majo Mckinnon MD      Orders Placed This Encounter   Procedures     CBC with platelets differential     CRP inflammation     Routine UA with Micro Reflex to Culture     Protein  random urine with Creat Ratio     Creatinine random urine     Erythrocyte sedimentation rate auto     ANCA IgG by IFA with Reflex to Titer     ANCA Vasculitis panel     Immunoglobulins A G and M     CD19 B Cell Count (B-lymphocyte antigen)     LYMPHEDEMA THERAPY REFERRAL

## 2019-07-12 NOTE — LETTER
7/12/2019      RE: Janine Cornell  [address] 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 Cornell is a 51 yo AAF who was referred to our clinic for evaluation and management of her joint pain in setting of positive RF 26.    Her joint symptoms first started more than a year ago, but over last 6-8 months fluctuating some good and bad days . All her joints are involved. Over last 5 months, hands became swollen, warm and painful. Tylenol does not help. Ketoprofen did not help. Was told to avoid NSAIDs given ACS. Reports AM/PM stiffness x 2-3 hr, can't make full fist when wakes up in AM.    She feels tired all the time. Reports worsening hair loss and unchanged  facial rash. Gets red sore flaky rash over cheeks across her face which is intermittent diagnosed Rosacea and is prescribed metronidazole gel which she has not started using. Reports occasional oral ulcers. Hands feel cold with red discoloration last winter. Has occasional dysphagia to both solids and liquid. Has dry eyes, is using OTC allergy eyedrops. Has chronic abdominal pain. Has h/o pancreatitis. Sometime has anxiety. Occasionally has numbness, tingling in fingers/toes. Has back and spine issues, her whole back and neck hurts, different areas at different time. She is wondering if she has FMS. Has hard time sleeping, has never been diagnosed with BRENNA. She does not know if he snores. She was found to have slightly positive RF in 2/2013. Has microcytic anemia.     Her arthralgia gets worse with drop in temperature. Reports body ache. Activity makes her pain worse. Her joint swelling isintermittent. Her body pain and joint pain is the same. Reports AM stiffness x 3 hr.    She has been doing acupuncture x few months and it is helping with her pain. She  thinks that the combination of plaquenil and acupuncture is helpful but overall she notice 30% in her symptoms relief.     Takes tylenol and tramadol on occasion for pain.    She decided to use different formulation of metformin which helped with her abdominal pain. I referred her to GI for evaluation of elevated lipase and abdominal pain. She decided to see GI in the future.     She is on  mg qd since 5/2014, tolerates it well and it helps her some. Denies taking any gabapentin due to chest pain and headches. She has had referral her for water aerobics, but she can't begin until she's healed from recent surgery in 10/2014. She thinks HCQ is helping but not enough to control all her pain, reports 2-3 hr of AM stiffness with ongoing diffuse arthralgia/myalgia along with intermittent swelling of hands. She does see her acupuncture person and it helps with her pain, has not started water aerobics yet. Has got her flu shot.     10/2015: Reports having pleuritic CP in 3/2015, was prescribed Lidoderm patches first. It did not help. Took prednisone (?dose) by her own, pain got better but it recurred off prednisone and gradually got worse to the point that she could not stand the pain anymore, was seen in ER in 5/2015, was treated with medrol dose pack which helped. Reports pain over hips, knees, ankles and fingers. Pain gets worse with walking. Reports myalgia, swelling of the arms. Pain over neck, shoulders. Has AM stiffness x 3-4 hr. Fingers swell up and become painful. Can't remember if steroid helped with joint pain. She had headaches, severe CP when she took tramadol and gabapentin and stopped taking them, sx resolved but she can't tell which one caused SEs but thinks that probably it was gabapentin. She has good days and tries to be more active but activity aggravates the pain. Headaches are intermittent. HCQ helps some not enough to control all the pain. No HCQ SEs. Had eye exam around July 2015. Flexeril  helps but PCP wants to change flexeril to zanaflex, reporting that she is NOT comfortable with the change. Acupuncture still helps an she continued to  do that. Concerned about fat pads she has in supraclavicular area, has h/o thyroid nodules. Continues having hair loss, takes iron for iron deficiency.     2/2016: She has 2 major complaints today, increased joint pain/swelling in hands/feet and recent Dx of optic neuritis in R eye, reports having similar problem about 20 yr ago, now recurred. Has a spot in R eye vision x long term, was seen by ophthalmology few days ago and was told to have optic neuritis. Gets joint pain, muscle pain. Can't  objects, hands are swollen. Knees, hips and ankles are painful. Reports more arthralgia. AM stiffness/ pain is 3-4 hr. She wants me to talk to her ophthalmologist directly.    She had botox inj for migraines which did not help her. She is going to have angiogram next wk, had to take more nitro for CP recently.     4/29/2016: She reports being on steroid taper x 3 since last visit. Reportedly, had orbit MRI, it showed swelling/inflamamtion of R lacrimal glands. She had painful swelling of her R eye, cause her headaches. Botox inj made her headaches worse. She is being dealing with this since 1/2016, with severe headaches and pain/swelling around R eye. Had biopsy in 3/2016. She is frustrated as prednisone causes her weight gain and her BG is difficult to control on it.    5/2016: At last visit, was prescribed MTX 10 mg po qwk to take along with FA 1 mg qd. She decided not to take MTX, is afraid of side effects, believes all these medications would harm her. She also believes that botox caused her inflammation around R eye. No major flare up of per-orbital inflamamtion since last visit but continues to have swelling around her R eye.    11/2016: Reports diffuse body ache, arthralgia, myalgia especially with weather change. No major flare up of campos-orbital inflammation but  her face/around R eye swells up from time to time. Reports 2 episodes of CP over past 2 mo, nitro sometimes helps and sometimes does not help. She has asthma and costochondritis and she has hard time to distinguish the origin of pain. Sometimes knees and fingers swell up. Her stiffness is sometimes all day.    4/2017:  Reports having sinusitis since last visit. Her R eye is dry and is using eyedrops to keep it moist. Reports having pain in ears. Was treated with antibiotics by PCP, it got better then it got worse. Reports catching infections easily. Then started having pressure over eyes with pain/headaches (exact start date is unknown). Pain gradually got worse and became persistent. Had sinus CT.     4/7/2017: Having a flare up of swelling, pain around her R orbit. Has not tried MTX yet.    5/2017: On MTX 10 mg po qwk since 4/7/2017, reports increased stomach pain and nausea and new headaches since start of MTX and it's not helping. Pain/swelling around R orbit is worse.    6/2017: She finished prednisone taper prescribed at last visit, R campos-orbital swelling significantly improved. 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    2/2018: R campos-orbital swelling has improved. Repeat R lacrimal gland biopsy in 8/2017 showed non specific inflammation with no lymphoma. She is off steroid. Did not tolerate AZA due to N/V and abdominal pain.    Is back with recurrence of R campos-orbital sweling x past 2 months. Pain really got worse over past 3wk, requries     9/2018: Declined ritiximab at last visit, lost f/u since 2/2018. Went to [place] and was seen on 8/29 by Dr. 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 Reportedly he ordered labs and recommended surgery since the inflammation of the R eye is back and is pushing the eye down. Janine took  some prednsione 30 mg every day few days a go for pain.    3/2019: She had lateral orbitotomy and debulking orbital mass right eye on 9/26/18 with Dr. Shah and Dr. Valdez at [place]. 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 There were no complications according to the op note.    Janine finally agreed to receive rituximab for her GPA. She had it as 1 gr q2wk x 2 on 12/12/2018 and 12/26/2018. Had minor allergic reaction which was managed by IV solu cortef and benadryl. She is off prednisone about 6wk after surgery. Had bleeding ulcer from prednsione. Has pain over sinus, ears. Still has residual pain, swelling around her R eye is concerned about it. Wants to transfer her care to ophthalmology here as it's closer to her.    Cold induced sweating congestion joint pain  Facial swelling  allegy doctor clear ear pft ok doxy sinusitis    Today (7/12/19):  Patient completed her 2 rounds of rituximab in June. She tolerated well. She was recently seen by Dr. Vernon in Opth[facility] and repeat CT scan of orbits shows some decrease in proptosis. She reports that her R eye still intermittently feels puffy. She also mentions intermittent swelling in her arms. Unclear of any triggers. Limbs are also heavy with muscle aches and some joint pains. Exacerbated with activity.      Component      Latest Ref Rng 2/28/2013 2/28/2013          12:14 PM 12:14 PM   WBC      4.0 - 11.0 10e9/L     RBC Count      3.8 - 5.2 10e12/L     Hemoglobin      11.7 - 15.7 g/dL     Hematocrit      35.0 - 47.0 %     MCV      78 - 100 fl     MCH      26.5 - 33.0 pg     MCHC      31.5 - 36.5 g/dL     RDW      10.0 - 15.0 %     Platelet Count      150 - 450 10e9/L     Specimen Description           Lyme Screen IgG and IgM           Vitamin D Deficiency screening      30 - 75 ug/L     Ferritin      10 - 300 ng/mL     Sed Rate      0 - 20 mm/h     ALLI Screen by EIA      <1.0     Rheumatoid Factor      0 - 14 IU/mL     CK Total      30 - 225 U/L  78   Uric  Acid      2.5 - 7.5 mg/dL 6.7      Component      Latest Ref Rng 2/28/2013          12:14 PM   WBC      4.0 - 11.0 10e9/L    RBC Count      3.8 - 5.2 10e12/L    Hemoglobin      11.7 - 15.7 g/dL    Hematocrit      35.0 - 47.0 %    MCV      78 - 100 fl    MCH      26.5 - 33.0 pg    MCHC      31.5 - 36.5 g/dL    RDW      10.0 - 15.0 %    Platelet Count      150 - 450 10e9/L    Specimen Description       Serum   Lyme Screen IgG and IgM       Test value: <0.75....Interpretation: Negative....If you highly suspect Lyme . . .   Vitamin D Deficiency screening      30 - 75 ug/L    Ferritin      10 - 300 ng/mL    Sed Rate      0 - 20 mm/h    ALLI Screen by EIA      <1.0    Rheumatoid Factor      0 - 14 IU/mL    CK Total      30 - 225 U/L    Uric Acid      2.5 - 7.5 mg/dL      Component      Latest Ref Rng 2/28/2013 2/28/2013 2/28/2013 2/28/2013          12:14 PM 12:14 PM 12:14 PM 12:14 PM   WBC      4.0 - 11.0 10e9/L       RBC Count      3.8 - 5.2 10e12/L       Hemoglobin      11.7 - 15.7 g/dL       Hematocrit      35.0 - 47.0 %       MCV      78 - 100 fl       MCH      26.5 - 33.0 pg       MCHC      31.5 - 36.5 g/dL       RDW      10.0 - 15.0 %       Platelet Count      150 - 450 10e9/L       Specimen Description             Lyme Screen IgG and IgM             Vitamin D Deficiency screening      30 - 75 ug/L       Ferritin      10 - 300 ng/mL    10   Sed Rate      0 - 20 mm/h   23 (H)    ALLI Screen by EIA      <1.0  <1.0 . . .     Rheumatoid Factor      0 - 14 IU/mL 26 (H)      CK Total      30 - 225 U/L       Uric Acid      2.5 - 7.5 mg/dL         Component      Latest Ref Rng 2/28/2013 2/28/2013          12:14 PM 12:14 PM   WBC      4.0 - 11.0 10e9/L 14.1 (H)    RBC Count      3.8 - 5.2 10e12/L 4.55    Hemoglobin      11.7 - 15.7 g/dL 10.7 (L)    Hematocrit      35.0 - 47.0 % 33.3 (L)    MCV      78 - 100 fl 73 (L)    MCH      26.5 - 33.0 pg 23.5 (L)    MCHC      31.5 - 36.5 g/dL 32.1    RDW      10.0 - 15.0 % 18.1 (H)     Platelet Count      150 - 450 10e9/L 407    Specimen Description           Lyme Screen IgG and IgM           Vitamin D Deficiency screening      30 - 75 ug/L  32   Ferritin      10 - 300 ng/mL     Sed Rate      0 - 20 mm/h     ALLI Screen by EIA      <1.0     Rheumatoid Factor      0 - 14 IU/mL     CK Total      30 - 225 U/L     Uric Acid      2.5 - 7.5 mg/dL          MRI CERVICAL SPINE WITHOUT CONTRAST 4/3/2013 12:47 PM    HISTORY: Cervicalgia. Degeneration of cervical intervertebral disc.  MRI cervical spine. Evaluate bilateral supraclavicular lymph nodes.  Clinical enlargement.    TECHNIQUE: Multiplanar multisequence MRI of the cervical spine  without gadolinium contrast.    COMPARISON: None.    FINDINGS: The patient has a developmentally small central canal.  Images of the cervical cord reveals small areas of T2 hyperintensity  within the cervical cord. There is a small area of high signal  intensity at the C1 level. There is also a small area of high signal  intensity at the C6-C7 level. The area of high signal at C6-C7 is  located at an area of central spinal stenosis. This may be due to  myelomalacia. The area of high signal at C1-C2 is indeterminate.    C2-C3: Normal disc, facet joints, spinal canal and neural foramina.    C3-C4: Normal disc, facet joints, spinal canal and neural foramina.    C4-C5: Normal disc, facet joints, spinal canal and neural foramina.    C5-C6: There is degeneration of the disc. There is a mild annular  disc bulge. The central canal is mild-moderately narrowed. The  residual AP diameter of the central spinal canal measures  approximately 9 mm.    C6-C7: There is degeneration of the disc. There is loss of disc space  height. There is a diffuse annular disc bulge. There are associated  posterior osteophytes. There are severe central spinal stenosis at  this level. The residual AP diameter of the central spinal canal is  only about 6 mm. There is significant flattening of the cord.  There  is high signal in the cord at this level consistent with  myelomalacia. There is moderate to severe right foraminal stenosis  due to and uncinate spur.    C7-T1: Normal disc, facet joints, spinal canal and neural foramina.            Result Impression       IMPRESSION:  1. Severe central spinal stenosis at C6-C7 due to a developmentally  small canal and due to a bulging disc and associated posterior  osteophyte. There is flattening of the cord at this level and high  signal in the cord at this level consistent with myelomalacia.  2. There is a small, indeterminate 2-3 mm area of high signal  intensity in the cord at the C1 level. This could be due to gliosis  secondary to a previous infectious or inflammatory process.  Demyelinating disease could also have a similar appearance. Clinical  correlation suggested.    GAIL MORE MD     MRI LEFT UPPER EXTREMITY NON-JOINT WITHOUT CONTRAST, MRI RIGHT UPPER  EXTREMITY NON-JOINT WITHOUT CONTRAST April 3, 2013 1:32 PM    HISTORY: Cervicalgia. Degeneration of cervical intervertebral disc.    TECHNIQUE: Multiplanar, multisequence imaging of the brachial plexus  was performed without gadolinium contrast enhancement.    COMPARISON: None.    FINDINGS: No mass lesions are seen. No inflammatory process is  identified. The roots, trunks, branches, and divisions of both the  right and left brachial plexus appear normal. No adenopathy is seen.  The anterior scalene muscles and the middle scalene muscles appear  normal. Nodules are seen within the thyroid gland. The largest nodule  is seen in the left lobe of the thyroid. This measures about 1.8 cm  in diameter.            Result Impression       IMPRESSION:  1. Both the right and left brachial plexus regions appear normal.  2. Thyroid nodules. The largest nodule is in the left lobe of the  thyroid. It measures about 1.8 cm in diameter.       XR WRIST BILATERAL G/E 3 VIEWS    Narrative:        EXAMINATION:  1. Each hand 3  views  2. Each wrist 3 views     DATE: 5/1/2013     HISTORY: Arthropathy with concern for rheumatoid.     FINDINGS: 3 views of each hand and 3 views of each wrist are obtained  without prior for comparison. Alignment is normal. There is no  fracture or acute bone abnormality. No distinct erosions are seen.  Some spurring of the distal radial ulnar joint is noted on the right.       Impression:     IMPRESSION:  1. No evidence of an inflammatory arthropathy involving either hand  or wrist.     VIELKA GUEVARA MD         XR FOOT BILATERAL G/E 3 VIEWS    Narrative:        EXAMINATION:  1. Each foot 3 views  2. Each ankle 3 views  3. Sacroiliac joint series     DATE: 5/1/2013     HISTORY: Arthropathy; concern for rheumatoid.     FINDINGS: No prior for comparison.     A frontal and bilateral oblique evaluation of the sacroiliac joints  shows no malalignment. Some patchy sclerosis is identified along the  central aspect of both sacroiliac joints, which is favored to be  degenerative. No distinct erosions are seen. The visualized portion  of the hip joint spaces appear maintained, with no erosive changes  identified. Mild endplate spurring is noted in the lower lumbar  spine.     3 views of each foot and 3 views of each ankle show no evidence of  acute fracture or dislocation. The metatarsal phalangeal,  tarsometatarsal and intertarsal joint spaces appear maintained. No  erosions are identified. There is no sign of acroosteolysis. The  ankle mortise and talar dome are intact bilaterally. Minimal spurring  is noted along the tip of the medial malleolus bilaterally.       Impression:     IMPRESSION:  1. Patchy sclerosis identified along the central aspect of both  sacroiliac joints, which is favored to be degenerative. No distinct  erosions are seen.  2. No evidence of an inflammatory arthropathy in either foot or ankle.     VIELKA GUEVARA MD     5/2013  CBC WITH PLATELETS DIFFERENTIAL       Component Value Range    WBC 11.3 (*) 4.0 -  11.0 10e9/L    RBC Count 4.56  3.8 - 5.2 10e12/L    Hemoglobin 11.1 (*) 11.7 - 15.7 g/dL    Hematocrit 34.3 (*) 35.0 - 47.0 %    MCV 75 (*) 78 - 100 fl    MCH 24.3 (*) 26.5 - 33.0 pg    MCHC 32.4  31.5 - 36.5 g/dL    RDW 16.1 (*) 10.0 - 15.0 %    Platelet Count 315  150 - 450 10e9/L    Diff Method Automated Method      % Neutrophils 71.6  40 - 75 %    % Lymphocytes 20.9  20 - 48 %    % Monocytes 4.3  0 - 12 %    % Eosinophils 2.8  0 - 6 %    % Basophils 0.2  0 - 2 %    % Immature Granulocytes 0.2  0 - 0.4 %    Absolute Neutrophil 8.1  1.6 - 8.3 10e9/L    Absolute Lymphocytes 2.4  0.8 - 5.3 10e9/L    Absolute Monoctyes 0.5  0.0 - 1.3 10e9/L    Absolute Eosinophils 0.3  0.0 - 0.7 10e9/L    Absolute Basophils 0.0  0.0 - 0.2 10e9/L    Abs Immature Granulocytes 0.0  0 - 0.03 10e9/L   AMYLASE       Component Value Range    Amylase 103  30 - 110 U/L   COMPREHENSIVE METABOLIC PANEL       Component Value Range    Sodium 144  133 - 144 mmol/L    Potassium 3.8  3.4 - 5.3 mmol/L    Chloride 105  94 - 109 mmol/L    Carbon Dioxide 23  20 - 32 mmol/L    Anion Gap 17  6 - 17 mmol/L    Glucose 173 (*) 60 - 99 mg/dL    Urea Nitrogen 13  5 - 24 mg/dL    Creatinine 0.83  0.52 - 1.04 mg/dL    GFR Estimate 74  >60 mL/min/1.7m2    GFR Estimate If Black 90  >60 mL/min/1.7m2    Calcium 9.7  8.5 - 10.4 mg/dL    Bilirubin Total 0.4  0.2 - 1.3 mg/dL    Albumin 4.3  3.9 - 5.1 g/dL    Protein Total 7.8  6.8 - 8.8 g/dL    Alkaline Phosphatase 66  40 - 150 U/L    ALT 36  0 - 50 U/L    AST 28  0 - 45 U/L   CK TOTAL       Component Value Range    CK Total 66  30 - 225 U/L   CRP INFLAMMATION       Component Value Range    CRP Inflammation 10.4 (*) 0.0 - 8.0 mg/L   LIPASE       Component Value Range    Lipase 353 (*) 20 - 250 U/L   ERYTHROCYTE SEDIMENTATION RATE AUTO       Component Value Range    Sed Rate 26 (*) 0 - 20 mm/h   ROUTINE UA WITH MICROSCOPIC REFLEX TO CULTURE       Component Value Range    Color Urine Yellow      Appearance Urine Slightly  Cloudy      Glucose Urine 30 (*) NEG mg/dL    Bilirubin Urine Negative  NEG    Ketones Urine 5 (*) NEG mg/dL    Specific Gravity Urine 1.026  1.003 - 1.035    Blood Urine Trace (*) NEG    pH Urine 5.0  5.0 - 7.0 pH    Protein Albumin Urine 10 (*) NEG mg/dL    Urobilinogen mg/dL Normal  0.0 - 2.0 mg/dL    Nitrite Urine Negative  NEG    Leukocyte Esterase Urine Negative  NEG    Source Midstream Urine      WBC Urine 1  0 - 2 /HPF    RBC Urine 4 (*) 0 - 2 /HPF    Squamous Epithelial /HPF Urine <1  0 - 1 /HPF    Mucous Urine Present (*) NEG /LPF    Hyaline Casts 3 (*) 0 - 2 /LPF    Calcium Oxalate Moderate (*) NEG /HPF   COMPLEMENT C3       Component Value Range    Complement C3 143  76 - 169 mg/dL   COMPLEMENT C4       Component Value Range    Complement C4 31  15 - 50 mg/dL   HEPATITIS C ANTIBODY       Component Value Range    Hepatitis C Antibody Negative  NEG   CARDIOLIPIN ANTIBODY IGG AND IGM       Component Value Range    Cardiolipin IgG Marline    0 - 15.0 GPL    Value: <15.0      Interpretation:  Negative    Cardiolipin IgM Marline    0 - 12.5 MPL    Value: <12.5      Interpretation:  Negative   LUPUS PANEL       Component Value Range    Lupus Result    NEG    Value: Negative      (Note)      COMMENTS:      The INR is normal.      APTT is normal.  1:2 Mix is not indicated.      DRVVT Screen is normal.      Thrombin time is normal.      NEGATIVE TEST; A LUPUS ANTICOAGULANT WAS NOT DETECTED IN THIS      SPECIMEN WITHIN THE LIMITS OF THE TESTING REPERTOIRE.      If the clinical picture is strongly suggestive of an antiphospholipid      syndrome, recommend anticardiolipin and beta-2-glycoprotein (IgG and      IgM) antibody tests.      Leela Franks M.D.  [phone]      5/2/2013            INR =  0.93 N = 0.86-1.14  (No additional charge)      TT = 15.7 N = 13.0-19.0 sec  (No additional charge)            APTT'S:    Seconds      Reagent =  Stago LA      Normal  =  38      Patient  =  35      1:2 Mix  =  N/A       Reference =  31-43             DILUTE MADELINE VIPER VENOM TEST:      DRVVT Screen Ratio = 0.76 Normal = <1.21         IMMUNOGLOBULIN G SUBCLASSES       Component Value Range    IGG 1030  695 - 1620 mg/dL    IgG1 488  300 - 856 mg/dL    IgG2 325  158 - 761 mg/dL    IgG3 47  24 - 192 mg/dL    IgG4 18  11 - 86 mg/dL   SUNNY ANTIBODY PANEL       Component Value Range    Ribonucleic Protein IgG Antibody 0      Smith Antibody IgG 1      SSA (RO) Antibody IgG 4      SSB (LA) Antibody IgG 0      Scleroderma Antibody IgG 0     BETA 2 GLYCOPROTEIN ANTIBODIES IGG IGM       Component Value Range    Beta-2-Glycopro IgG 1      Beta-2-Glycopro IgM 5     CYCLIC CIT PEPT IGG       Component Value Range    Cyclic Cit Pept IgG/IgA    <20 UNITS    Value: <20      Interpretation:  Negative   DNA DOUBLE STRANDED ANTIBODIES       Component Value Range    DNA-ds    0 - 29 IU/mL    Value: <15      Interpretation:  Negative       CT CHEST PULMONARY EMBOLISM W CONTRAST 5/20/2015 4:57 PM  HISTORY: Pain. SOB. Elevated d-dimer.   TECHNIQUE: 65 mL Isovue 370. Axial images with coronal  reconstructions.  COMPARISON: None.  FINDINGS: Calcified granuloma with surrounding scarring in the  posterolateral left upper lobe. Triangular shaped opacity at the right  lung base in the lateral right middle lobe could represent some  scarring, atelectasis or infiltrate. There is also some scarring or  atelectasis in the posteromedial right lung base. Lungs otherwise  clear.  The pulmonary arteries are well opacified. No CT evidence for acute  pulmonary embolus. No aortic dissection.  Diffuse fatty infiltration of the liver.  IMPRESSION  IMPRESSION:   1. No pulmonary embolus identified.  2. Small focus of atelectasis, infiltrate or scarring in the lateral  right middle lobe.  3. Old granulomatous disease.  4. Otherwise negative.  SILVERIO VAZQUEZ MD    Results for FAVIO MARTINEZ (MRN [id number]) as of 10/30/2015 17:00   Ref. Range 8/21/2012 09:06 5/22/2013 14:22  4/14/2014 12:06 9/11/2014 12:35 12/4/2014 16:38   TSH Latest Range: 0.40-4.00 mU/L 0.83 0.43 0.27 (L) 0.23 (L) 0.22 (L)     Component      Latest Ref Rng 10/30/2015   WBC      4.0 - 11.0 10e9/L 13.4 (H)   RBC Count      3.8 - 5.2 10e12/L 4.76   Hemoglobin      11.7 - 15.7 g/dL 12.4   Hematocrit      35.0 - 47.0 % 37.9   MCV      78 - 100 fl 80   MCH      26.5 - 33.0 pg 26.1 (L)   MCHC      31.5 - 36.5 g/dL 32.7   RDW      10.0 - 15.0 % 14.5   Platelet Count      150 - 450 10e9/L 324   Diff Method       Automated Method   % Neutrophils       67.7   % Lymphocytes       22.7   % Monocytes       6.3   % Eosinophils       2.7   % Basophils       0.4   % Immature Granulocytes       0.2   Absolute Neutrophil      1.6 - 8.3 10e9/L 9.1 (H)   Absolute Lymphocytes      0.8 - 5.3 10e9/L 3.1   Absolute Monoctyes      0.0 - 1.3 10e9/L 0.9   Absolute Eosinophils      0.0 - 0.7 10e9/L 0.4   Absolute Basophils      0.0 - 0.2 10e9/L 0.1   Abs Immature Granulocytes      0 - 0.4 10e9/L 0.0   Creatinine      0.52 - 1.04 mg/dL 1.21 (H)   GFR Estimate      >60 mL/min/1.7m2 47 (L)   GFR Estimate If Black      >60 mL/min/1.7m2 57 (L)   Iron      35 - 180 ug/dL 70   Iron Binding Cap      240 - 430 ug/dL 428   Iron Saturation Index      15 - 46 % 16   Albumin      3.4 - 5.0 g/dL 4.6   ALT      0 - 50 U/L 29   AST      0 - 45 U/L 21   CRP Inflammation      0.0 - 8.0 mg/L <2.9   Sed Rate      0 - 20 mm/h 8   Vitamin D Deficiency screening      20 - 75 ug/L 46   Ferritin      8 - 252 ng/mL 18   TSH      0.40 - 4.00 mU/L 0.49   T4 Free      0.76 - 1.46 ng/dL 1.06   Free T3      2.3 - 4.2 pg/mL 2.8     Component      Latest Ref Rng 11/17/2015   Testosterone Total      8 - 60 ng/dL 19   Sex Hormone Binding Globulin      30 - 135 nmol/L 32   Free Testosterone Calculated      0.11 - 0.58 ng/dL 0.34   Vitamin A       0.61   Retinol Palmitate       <0.02 . . .   Vitamin A Interp       Normal . . .   Thyroglobulin Antibody      <40 IU/mL <20   Thyroid  Peroxidase Antibody      <35 IU/mL 31   DHEA Sulfate      35 - 430 ug/dL 101   Zinc       68     Component      Latest Ref Rng 1/27/2016   Sodium      133 - 144 mmol/L 135   Potassium      3.4 - 5.3 mmol/L 4.0   Chloride      94 - 109 mmol/L 104   Carbon Dioxide      20 - 32 mmol/L 24   Anion Gap      3 - 14 mmol/L 7   Glucose      70 - 99 mg/dL 88   Urea Nitrogen      7 - 30 mg/dL 20   Creatinine      0.52 - 1.04 mg/dL 1.13 (H)   GFR Estimate      >60 mL/min/1.7m2 51 (L)   GFR Estimate If Black      >60 mL/min/1.7m2 62   Calcium      8.5 - 10.1 mg/dL 9.4   Bilirubin Total      0.2 - 1.3 mg/dL 0.7   Albumin      3.4 - 5.0 g/dL 4.3   Protein Total      6.8 - 8.8 g/dL 7.7   Alkaline Phosphatase      40 - 150 U/L 66   ALT      0 - 50 U/L 24   AST      0 - 45 U/L 18   Cholesterol      <200 mg/dL 112   Triglycerides      <150 mg/dL 100   HDL Cholesterol      >49 mg/dL 34 (L)   LDL Cholesterol Calculated      <100 mg/dL 58   Non HDL Cholesterol      <130 mg/dL 78   N-Terminal Pro Bnp      0 - 125 pg/mL 29   Hemoglobin A1C      4.3 - 6.0 % 7.0 (H)   Amylase      30 - 110 U/L 60   Lipase      73 - 393 U/L 394 (H)   Troponin I ES      0.000 - 0.045 ug/L <0.015 . . .     Component      Latest Ref Rng 2/24/2016   Angiotensin Converting Enzyme       <5 (L) . . .   Neutrophil Cytoplasmic IgG Antibody       <1:20 . . .   Sed Rate      0 - 20 mm/h 86 (H)     Copath Report      Patient Name: FAVIO MARTINEZ   MR#: [id number]   Specimen #: [id number]   Collected: 3/15/2016   Received: 3/15/2016   Reported: 3/17/2016 13:33   Ordering Phy(s): PARVEEN ENRIQUEZ     ORIGINAL REPORT FOLLOWS ADDENDUM  ADDENDUM     TO ORIGINAL REPORT   Status: Signed Out   Date Ordered:3/18/2016   Date Complete:3/18/2016   Date Reported:3/18/2016 12:06   Signed Out By: Marianne Godfrey MD     INTERPRETATION:   This addendum is done to incorporate the results of fungal (GMS) stains   done on the specimen.  Specimen is negative for fungal organisms.  The  "  original final diagnosis remains unchanged.     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  Negative for malignancy(Please   see microscopic description)     Electronically signed out by:     Marianne Godfrey MD     CLINICAL HISTORY:   right orbital mass     GROSS:   The specimen is received labeled \"right orbital biopsy\" and consists of   red-tan nodule measuring 1.5 x 0.9 x 0.6 cm with one smooth side and   opposite rough side consistent with periosteum.  The specimen is   bisected revealing homogenous pale tan fleshy cut surface.  Touch   preparations are prepared, air dried and fixed, portion of specimen is   submitted in RPMI for possible lymphoma workup [facility],   ([facility]. [place], [place], [place] ).  The remainder is entirely submitted.   (Dictated by: Marianne Godfrey MD 3/15/2016 04:45 PM)     MICROSCOPIC:     Specimen consists of portion of lacrimal gland with acute and chronic   inflammation.  Focal area of microabscess formation associated with   small areas of necrosis are also present.  Inflammation is seen   extending to the periorbital adipose tissue forming panniculitis.   Specimen is negative for malignancy.  Samples sent for immunophenotyping   to [facility], ([facility]. [place], [place], [place] ) reveals no evidence   of monoclonality or aberrant antigen expression.  A GMS (fungal) stain   is pending and results will be reported as an addendum.     CPT Codes:   A: 35009-XY1, 43264-SZWEP, SOH, 15399-KPXPM, 75978-IRWZ     TESTING LAB LOCATION:   [address]  [id number] [phone]     COLLECTION SITE:   [facility]   [facility]            Component      Latest Ref Rng 4/29/2016 "   Nucleated RBCs      0 /100 0   Absolute Neutrophil      1.6 - 8.3 10e9/L 8.9 (H)   Absolute Lymphocytes      0.8 - 5.3 10e9/L 3.2   Absolute Monocytes      0.0 - 1.3 10e9/L 0.8   Absolute Eosinophils      0.0 - 0.7 10e9/L 0.2   Absolute Basophils      0.0 - 0.2 10e9/L 0.1   Abs Immature Granulocytes      0 - 0.4 10e9/L 0.1   Absolute Nucleated RBC       0.0   IGG      695 - 1620 mg/dL 836   IgG1      300 - 856 mg/dL 280 (L)   IgG2      158 - 761 mg/dL 277   IgG3      24 - 192 mg/dL 35   IgG4      11 - 86 mg/dL 16   RNP Antibody IgG      0.0 - 0.9 AI <0.2 . . .   Smith SUNNY Antibody IgG      0.0 - 0.9 AI <0.2 . . .   SSA (Ro) (SUNNY) Antibody, IgG      0.0 - 0.9 AI <0.2 . . .   SSB (La) (SUNNY) Antibody, IgG      0.0 - 0.9 AI <0.2 . . .   Scleroderma Antibody Scl-70 SUNNY IgG      0.0 - 0.9 AI <0.2 . . .   Cholesterol      <200 mg/dL 154   Triglycerides      <150 mg/dL 220 (H)   HDL Cholesterol      >49 mg/dL 42 (L)   LDL Cholesterol Calculated      <100 mg/dL 67   Non HDL Cholesterol      <130 mg/dL 111   M Tuberculosis Result      NEG Negative   M Tuberculosis Antigen Value       0.00   Albumin      3.4 - 5.0 g/dL 4.3   ALT      0 - 50 U/L 30   AST      0 - 45 U/L 10   Complement C3      76 - 169 mg/dL 157   Complement C4      15 - 50 mg/dL 32   CRP Inflammation      0.0 - 8.0 mg/L <2.9   Sed Rate      0 - 20 mm/h 2   DNA-ds      <10 IU/mL 1   Cyclic Citrullinated Peptide Antibody, IgG      <7 U/mL 1   Rheumatoid Factor      <20 IU/mL <20   Proteinase 3 Antibody IgG      0.0 - 0.9 AI <0.2 . . .   Myeloperoxidase Antibody IgG      0.0 - 0.9 AI 2.5 (H)   Vitamin D Deficiency screening      20 - 75 ug/L 24   Hemoglobin A1C      4.3 - 6.0 % 7.9 (H)   ALLI by IFA IgG       1:40 (A) . . .     Component      Latest Ref Rng & Units 4/7/2017   WBC      4.0 - 11.0 10e9/L 11.3 (H)   RBC Count      3.8 - 5.2 10e12/L 4.77   Hemoglobin      11.7 - 15.7 g/dL 12.5   Hematocrit      35.0 - 47.0 % 38.7   MCV      78 - 100 fl 81   MCH       26.5 - 33.0 pg 26.2 (L)   MCHC      31.5 - 36.5 g/dL 32.3   RDW      10.0 - 15.0 % 13.2   Platelet Count      150 - 450 10e9/L 347   Diff Method       Automated Method   % Neutrophils      % 67.1   % Lymphocytes      % 22.9   % Monocytes      % 6.2   % Eosinophils      % 3.0   % Basophils      % 0.4   % Immature Granulocytes      % 0.4   Nucleated RBCs      0 /100 0   Absolute Neutrophil      1.6 - 8.3 10e9/L 7.5   Absolute Lymphocytes      0.8 - 5.3 10e9/L 2.6   Absolute Monocytes      0.0 - 1.3 10e9/L 0.7   Absolute Eosinophils      0.0 - 0.7 10e9/L 0.3   Absolute Basophils      0.0 - 0.2 10e9/L 0.1   Abs Immature Granulocytes      0 - 0.4 10e9/L 0.1   Absolute Nucleated RBC       0.0   IGG      695 - 1620 mg/dL 962   IgG1      300 - 856 mg/dL Test sent to [facility]. See [facility].   IgG2      158 - 761 mg/dL Test sent to [facility]. See [facility].   IgG3      24 - 192 mg/dL Test sent to [facility]. See [facility].   IgG4      11 - 86 mg/dL Test sent to [facility]. See [facility].   Result       SEE NOTE . . .   Test Name       IGG SUBCLASSES   Send Outs Misc Test Code       02872   Send Outs Misc Test Specimen       Serum   Creatinine      0.52 - 1.04 mg/dL 1.20 (H)   GFR Estimate      >60 mL/min/1.7m2 47 (L)   GFR Estimate If Black      >60 mL/min/1.7m2 57 (L)   Creatinine Urine      mg/dL    Albumin Urine mg/L      mg/L    Albumin Urine mg/g Cr      0 - 25 mg/g Cr    Myeloperoxidase Antibody IgG      0.0 - 0.9 AI 2.9 (H)   Proteinase 3 Antibody IgG      0.0 - 0.9 AI <0.2 . . .   Neutrophil Cytoplasmic IgG Antibody       1:80 (A) . . .   Angiotensin Converting Enzyme       <5 (L) . . .   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      Comparison: MRI brain 5/29/2013      Technique:   Orbits: Axial and coronal T1-weighted, and coronal T2-weighted images  obtained without intravenous contrast. Post-intravenous contrast  (using gadolinium) sagittal FLAIR, were obtained with fat-saturation,  focused on the orbits.  Brain: Axial susceptibility-weighted and FLAIR sequences were obtained  of the whole brain without intravenous contrast, and postcontrast  axial T1-weighted images were obtained through the whole brain.   Contrast: 7.5mL Gadavist     Findings:  New asymmetric enlargement of the right lacrimal gland and enhancement  of the right lacrimal gland with surrounding ill-defined crescentic T2  hyperintense signal and enhancement within the right superior  superotemporal orbit, predominantly involving the extraconal space  with slight intraconal extension. No definite associated restricted  diffusion. No discrete extraocular muscle enlargement. Normal  symmetric optic nerve signal. Cavernous sinuses and orbital apices are  normal. Globes are normal.     Whole brain images are symmetric minimal leukoaraiosis and generalized  parenchymal volume loss. Ventricles are not enlarged. No intracranial  hemorrhage, mass effect, midline shift or abnormal extra axial fluid  collection. No abnormal foci of intracranial enhancement or restricted  diffusion. Paranasal sinuses and mastoid air cells are clear.            Impression:    New abnormal enlargement of the right lacrimal gland with surrounding  ill-defined T2 hyperintense signal and enhancement centered in the  superotemporal right orbital fat, which may represent   infectious/inflammatory dacryadenitis, orbital pseudotumor, lymphoma,  carcinoma, sarcoidosis or IgG4  disease..     I have personally reviewed the examination and initial interpretation  and I agree with the findings.     PATRICIA BRANTLEY MD      Component      Latest Ref Rng & Units 6/1/2017   WBC      4.0 - 11.0 10e9/L 12.0 (H)   RBC Count      3.8 - 5.2 10e12/L 5.18   Hemoglobin      11.7 - 15.7 g/dL 13.8   Hematocrit      35.0 - 47.0 % 41.0   MCV      78 - 100 fl 79   MCH      26.5 - 33.0 pg 26.6   MCHC      31.5 - 36.5 g/dL 33.7   RDW      10.0 - 15.0 % 14.8   Platelet Count      150 - 450 10e9/L 322   Diff Method       Automated Method   % Neutrophils      % 76.7   % Lymphocytes      % 16.5   % Monocytes      % 4.6   % Eosinophils      % 1.9   % Basophils      % 0.3   Absolute Neutrophil      1.6 - 8.3 10e9/L 9.2 (H)   Absolute Lymphocytes      0.8 - 5.3 10e9/L 2.0   Absolute Monocytes      0.0 - 1.3 10e9/L 0.6   Absolute Eosinophils      0.0 - 0.7 10e9/L 0.2   Absolute Basophils      0.0 - 0.2 10e9/L 0.0   Color Urine       Yellow   Appearance Urine       Clear   Glucose Urine      NEG mg/dL Negative   Bilirubin Urine      NEG Negative   Ketones Urine      NEG mg/dL Negative   Specific Gravity Urine      1.003 - 1.035 1.010   pH Urine      5.0 - 7.0 pH 5.5   Protein Albumin Urine      NEG mg/dL Negative   Urobilinogen Urine      0.2 - 1.0 EU/dL 0.2   Nitrite Urine      NEG Negative   Blood Urine      NEG Negative   Leukocyte Esterase Urine      NEG Negative   Source       Midstream Urine   WBC Urine      0 - 2 /HPF O - 2   RBC Urine      0 - 2 /HPF O - 2   Squamous Epithelial /LPF Urine      FEW /LPF Few   Bacteria Urine      NEG /HPF Few (A)   Creatinine      0.52 - 1.04 mg/dL 1.19 (H)   GFR Estimate      >60 mL/min/1.7m2 48 (L)   GFR Estimate If Black      >60 mL/min/1.7m2 58 (L)   Protein Random Urine      g/L <0.05   Protein Total Urine g/gr Creatinine      0 - 0.2 g/g Cr Unable to calculate due to low value   Myeloperoxidase Antibody IgG      0.0 - 0.9 AI 1.9 (H)   Proteinase 3 Antibody IgG      0.0 -  "0.9 AI <0.2 . . .   ALT      0 - 50 U/L 29   AST      0 - 45 U/L 18   Albumin      3.4 - 5.0 g/dL 4.6   CRP Inflammation      0.0 - 8.0 mg/L 6.1   Sed Rate      0 - 30 mm/h 13   Creatinine Urine Random      mg/dL 54   Neutrophil Cytoplasmic IgG Antibody       <1:20 . . .   Creatinine Urine      mg/dL 54       Patient Name: FAVIO MARTINEZ   MR#: [id number]   Specimen #: [id number]   Collected: 8/4/2017   Received: 8/4/2017   Reported: 8/9/2017 16:19   Ordering Phy(s): CRISTHIAN FLORES     For improved result formatting, select 'View Enhanced Report Format'   under Linked Documents section.     SPECIMEN(S):   Eye, right lacrimal gland     FINAL DIAGNOSIS:   Eye, right, \"lacrimal gland\", biopsy   - Dense fibrosis and patchy inflammation   - No residual lacrimal gland seen     COMMENT:   Sections show tissue involved by dense fibrosis and patchy inflammation.   There is no morphologic or immunohistochemical evidence of lymphoma. By   separate report, concurrent flow cytometry studies ([id number]),   performed at the [facility], show polytypic B cells and no   aberrant immunophenotype on T cells. Immunohistochemical stains for IgG   and IgG-4 were performed, which provide no support for IgG-4-related   disease. Some of these changes may be related to prior surgery. Sjögren   disease is not excluded. There is no evidence of malignancy. Dr. Max Conway has reviewed this case and concurs.     Electronically signed out by:     Antonella Beth M.D.   INDICATION:  Right eye edema. Reported history of right orbital biopsy yielding pathology consistent with idiopathic inflammation.    TECHNIQUE:  Noncontrast CT images were obtained through the brain and facial bones.    COMPARISON:  CT sinus 03/27/2017, MRI brain and orbits 02/15/2016.     FINDINGS:  CT facial bones:   Large soft tissue lesion centered within the lateral intraconal and extraconal right orbit has significantly increased in size compared to the CT " dated 03/27/2017. The lesion is inseparable from the right superior, lateral, and inferior rectus muscles, as well as the right lacrimal gland. The lesion demonstrates extension posteriorly slightly proximal to the orbital apex, as well as extension into the medial orbit superiorly and anteriorly. Mass effect includes medial bowing of the optic nerve sheath complex and pronounced proptosis of the right globe.  No mass is identified in the right orbit.  Torus palatinus.   Minimal mucosal thickening in the ethmoid air cells. The remainder of the visualized paranasal sinuses are clear.  CT brain:  The ventricles and sulci within normal limits for patient age. No mass effect or midline shift. The gray-white differentiation is maintained.  No acute intracranial hemorrhage or pathologic extra-axial fluid collection.  The calvarium is intact. The mastoid air cells are clear.    IMPRESSION:  1. Large soft tissue lesion centered within the lateral intraconal and extraconal right orbit has significantly increased in size compared to the CT dated 03/27/2017. The lesion is inseparable from the right lacrimal gland and rectus musculature. Mass effect includes medial bowing of the optic nerve sheath complex and pronounced proptosis of the right globe. Given provided history, findings may represent a progressive inflammatory etiology (pseudotumor). Other differential considerations, such as sarcoidosis or lymphoma, could also have this appearance.  2. No acute intracranial hemorrhage or intracranial mass effect.    Please note that all CT scans at this facility use dose modulation, iterative reconstruction, and/or weight-based dosing when appropriate to reduce radiation dose to as low as reasonably achievable.    Dictated by Charles Ward MD @ Jul 16 2018  3:54PM    Signed by Dr. Charles Ward @ Jul 16 2018  4:20PM    CT ORBITAL WO CONTRAST 7/11/2019 3:42 PM     History: orbital pseudotumor; Subjective visual disturbance;  Visual  field defect; Idiopathic orbital inflammatory syndrome  ICD-10: Subjective visual disturbance; Visual field defect; Idiopathic  orbital inflammatory syndrome     Comparison: CT 3/6/2019 and 7/16/2018, MRI brain 5/9/2017                                                                   Impression:   1. 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  2. Slightly decreased right orbital proptosis since 3/6/2019.      ROS:  A comprehensive ROS was done, positives are per HPI.        HISTORY REVIEW:  Past Medical History:   Diagnosis Date     Abnormal glandular Papanicolaou smear of cervix 1992     Allergic rhinitis     Allergy, airborne subst     Arthritis      ASCVD (arteriosclerotic cardiovascular disease)      Chronic pain      Chronic pancreatitis (H)     idiopathic, Tx: PPI, antioxidants, pancreatic enzymes     Common migraine      Coronary artery disease      Costochondritis      Difficulty in walking(719.7)      Dyspnea on exertion      Ectasia, mammary duct     [facility], persistent nipple discharge     Elevated fasting glucose      Gastro-oesophageal reflux disease      Hernia      History of angina      Hyperlipidemia LDL goal < 100      Hypertension goal BP (blood pressure) < 140/90     Essential hypertension     Iron deficiency anemia      Ischemic cardiomyopathy      Menorrhagia      Migraine headaches      Mild persistent asthma      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 Matias, neg w/u, Rx'd w/prednisone     Ventral hernia, unspecified, without mention of obstruction or gangrene 2012     Past Surgical History:   Procedure Laterality Date     C ECHO HEART XTHORACIC,COMPLETE, W/O DOPPLER  04    [facility] WNL, EF 60%     C/SECTION, LOW TRANSVERSE           CARDIAC SURGERY      cardiac stent- recent in 16; 4 other stents     DILATION AND CURETTAGE N/A 2016    Procedure: DILATION AND CURETTAGE;  Surgeon: Nahed Butler MD;  Location: [facility] OR     HC UGI ENDOSCOPY W EUS  08    Dr. Pastrana, possible chronic pancreatitis, fatty liver     HERNIA REPAIR  2012    done at [facility] [place]     INSERT INTRAUTERINE DEVICE N/A 2016    Procedure: INSERT INTRAUTERINE DEVICE;  Surgeon: Nahed Butler MD;  Location: [facility]     INT UTERINE FIBRIOD EMBOLIZATION  10/29/2014     LAPAROSCOPIC CHOLECYSTECTOMY  08    Dr. Arnol GRUBBS TX, CERVICAL      s/p LEEP     ORBITOTOMY Right 3/15/2016    Procedure: ORBITOTOMY;  Surgeon: Myron Cyr MD;  Location: [facility]     ORBITOTOMY Right 2017    Procedure: ORBITOTOMY;  RIGHT ORBITOTOMY AND BIOPSY;  Surgeon: Charis Holbrook MD;  Location: [facility]     REPAIR PTOSIS Right 2017    Procedure: REPAIR PTOSIS;  RIGHT UPPER LID PTOSIS REPAIR;  Surgeon: Myron Cyr MD;  Location: [facility]     UPPER GI ENDOSCOPY  10/21/08    mild gastritis, Dr. Hidalgo     Family History   Problem Relation Age of Onset     Heart Disease Father 50        heart attack     Cerebrovascular Disease Father      Diabetes Father      Hypertension Father      Depression Father      C.A.D. Father      Hypertension  Mother      Arthritis Mother      Heart Disease Mother         long qt syndrome     Thyroid Disease Mother      C.A.D. Mother      Heart Disease Brother 15        MI at 15, 16.      Diabetes Maternal Uncle      Hypertension Maternal Aunt      Hypertension Maternal Uncle      Arthritis Brother         he passed away, had severe arthritis at age 11     Arthritis Maternal Uncle      Eye Disorder Maternal Uncle         cataracts     Neurologic Disorder Sister         migraines     Neurologic Disorder Sister         migraines     Respiratory Son         asthma     Cerebrovascular Disease Maternal Uncle      C.A.D. Brother      Family History Negative Other         neg for RA, SLE     Unknown/Adopted No family hx of         unknown neurological issues in her family, mother was adopted     Skin Cancer No family hx of         No known family hx of skin cancer     Social History     Socioeconomic History     Marital status: Single     Spouse name: Not on file     Number of children: 1     Years of education: Not on file     Highest education level: Not on file   Occupational History     Employer: NONE    Social Needs     Financial resource strain: Not on file     Food insecurity:     Worry: Not on file     Inability: Not on file     Transportation needs:     Medical: Not on file     Non-medical: Not on file   Tobacco Use     Smoking status: Current Every Day Smoker     Packs/day: 0.20     Years: 1.00     Pack years: 0.20     Types: Cigarettes     Last attempt to quit: 2016     Years since quitting: 3.4     Smokeless tobacco: Never Used   Substance and Sexual Activity     Alcohol use: No     Alcohol/week: 0.0 oz     Drug use: No     Sexual activity: Not Currently   Lifestyle     Physical activity:     Days per week: Not on file     Minutes per session: Not on file     Stress: Not on file   Relationships     Social connections:     Talks on phone: Not on file     Gets together: Not on file     Attends Yarsanism  service: Not on file     Active member of club or organization: Not on file     Attends meetings of clubs or organizations: Not on file     Relationship status: Not on file     Intimate partner violence:     Fear of current or ex partner: Not on file     Emotionally abused: Not on file     Physically abused: Not on file     Forced sexual activity: Not on file   Other Topics Concern     Parent/sibling w/ CABG, MI or angioplasty before 65F 55M? Yes   Social History Narrative    Balanced Diet - sometimes    Osteoporosis Prevention Measures - Dairy servings per day: 2 servings weekly    Regular Exercise -  Yes Describe walking 4 times a week    Dental Exam - NO    Seatbelts used - Yes    Self Breast Exam - Yes    Abuse: Current or Past (Physical, Sexual or Emotional)- No    Do you have any concerns about STD's -  No    Do you feel safe in your environment - No    Guns stored in the home - No    Sunscreen used - Yes    Lipids -  YES - Date: 053102    Glucose -  YES - Date: 012804    Eye Exam - YES - Date: one year ago    Colon Cancer Screening - No    Hemoccults - NO    Pap Test -  YES - Date: 070904, remote history of LEEP    Mammography - YES - Date: last spring, would like to discuss, needs a referral to [facility] center    DEXA - NO    Immunizations reviewed and up to date - Yes, last td given in 1997 or 1998     Patient Active Problem List   Diagnosis     Seasonal affective disorder (H)     Allergic rhinitis     PCOS (polycystic ovarian syndrome)     Moderate persistent asthma     Chronic pancreatitis (H)     Hypertension goal BP (blood pressure) < 140/90     Common migraine     NSTEMI (non-ST elevated myocardial infarction) (H)     Hyperlipidemia LDL goal <70     ASCVD (arteriosclerotic cardiovascular disease)     GERD (gastroesophageal reflux disease)     Ischemic cardiomyopathy     Hypertensive heart disease     Uterine leiomyoma     Iron deficiency anemia     Costochondritis     Vitamin D deficiency      Breast cancer screening     Spinal stenosis in cervical region     Fibromyalgia     Seronegative rheumatoid arthritis (H)     Type 2 diabetes, HbA1C goal < 8% (H)     Type 2 diabetes mellitus with other specified complication (H)     Hyperlipidemia associated with type 2 diabetes mellitus (H)     Hypertension associated with diabetes (H)     Overweight with body mass index (BMI) 25.0-29.9     Tobacco use disorder     Telogen effluvium     CAD S/P percutaneous coronary angioplasty     Status post coronary angiogram     ANCA-associated vasculitis (H)     Wegener's vasculitis (H)     Type 1 diabetes mellitus with complications (H)       Pregnancy Hx: She is . All misscarriages were in first trimester. H/o OC use in the past. 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    PMHx, FHx, SHx were reviewed, unchanged.      Outpatient Encounter Medications as of 2019   Medication Sig Dispense Refill     acetaminophen (TYLENOL) 325 MG tablet Take 1-2 tablets (325-650 mg) by mouth every 6 hours as needed for pain (headache) 250 tablet 0     albuterol (2.5 MG/3ML) 0.083% neb solution INL 1 VIAL VIA NEBULIZATION Q 4 TO 6 HOURS PRN  1     albuterol (PROAIR HFA, PROVENTIL HFA, VENTOLIN HFA) 108 (90 BASE) MCG/ACT inhaler Inhale 2 puffs into the lungs every 6 hours as needed for shortness of breath / dyspnea or wheezing 3 Inhaler 1     ASPIRIN LOW DOSE 81 MG EC tablet Take 1 tablet (81 mg) by mouth daily 30 tablet 11     BASAGLAR 100 UNIT/ML injection Inject 40 Units Subcutaneous daily 12 mL 2     blood glucose monitoring (NO BRAND SPECIFIED) meter device kit Use to test blood sugar 4 X times daily or as directed. (Patient taking differently: 1 kit Use to test blood sugar 4 X times daily or as directed.) 1 kit 0     blood glucose monitoring (NO BRAND SPECIFIED) test strip 1 strip by In Vitro route 4 times daily Test as directed. Please dispense three months and three months of refills. Thank you. (Patient  taking differently: 1 strip by In Vitro route 4 times daily Test as directed. Please dispense three months and three months of refills. Thank you.) 360 each 3     Blood Pressure Monitor KIT 1 each daily Monitor home blood pressure as instructed by physician.  Dispense [place] blood pressure kit. 1 kit 0     calcium carbonate (TUMS) 500 MG chewable tablet Take 3-4 chew tab by mouth daily as needed.       clopidogrel (PLAVIX) 75 MG tablet Take 1 tablet (75 mg) by mouth daily 30 tablet 11     COMPRESSION STOCKINGS 2 each daily Apply one 10-15 mmHg compression stocking to each lower extgmierty during the day and remove before bedtime. 4 each 2     cyclobenzaprine (FLEXERIL) 10 MG tablet Take 1 tablet (10 mg) by mouth 2 times daily as needed for muscle spasms 60 tablet 2     cycloSPORINE (RESTASIS) 0.05 % ophthalmic emulsion Place 1 drop into both eyes 2 times daily 60 each 11     cycloSPORINE (RESTASIS) 0.05 % ophthalmic emulsion Place 1 drop into the right eye every 12 hours       desonide (DESOWEN) 0.05 % cream Apply topically 2 times daily       dicyclomine (BENTYL) 20 MG tablet TAKE 1 TABLET(20 MG) BY MOUTH FOUR TIMES DAILY AS NEEDED 60 tablet 3     diphenhydrAMINE (BENADRYL) 25 MG capsule TK 1 TO 2 CS PO QHS  4     EPIPEN 2-RIKY 0.3 MG/0.3ML injection INJECT 0.3 MG INTO THE MUSCLE PRF ANAPHYALAXIS  0     ferrous gluconate (FERGON) 324 (38 Fe) MG tablet Take 1 tablet (324 mg) by mouth 2 times daily (with meals) 180 tablet 3     fexofenadine (ALLEGRA) 180 MG tablet Take 1 tablet by mouth daily as needed. 90 tablet 3     fluocinolone (SYNALAR) 0.01 % solution Apply topically daily as needed       fluticasone-vilanterol (BREO ELLIPTA) 100-25 MCG/INH inhaler Inhale 1 puff into the lungs daily       folic acid (FOLVITE) 1 MG tablet Take 1 tablet by mouth daily 90 tablet 3     hydroxychloroquine (PLAQUENIL) 200 MG tablet Take 2 tablets (400 mg) by mouth daily 180 tablet 1     insulin pen needle (BD MARCK U/F) 32G X 4 MM USE  DAILY OR AS DIRECTED 300 each 3     ketoconazole (NIZORAL) 2 % shampoo Apply topically daily as needed       lisinopril-hydrochlorothiazide (PRINZIDE/ZESTORETIC) 20-25 MG tablet Take 1 tablet by mouth daily 30 tablet 11     Magnesium Oxide -Mg Supplement 250 MG TABS TK 1 T PO BID. REDUCE IF STOOLS LOOSEN  11     metFORMIN (GLUCOPHAGE-XR) 500 MG 24 hr tablet TAKE 2 TABLETS(1000 MG) BY MOUTH DAILY WITH DINNER 180 tablet 0     metoclopramide (REGLAN) 10 MG tablet Take 1 tablet (10 mg) by mouth 4 times daily (before meals and nightly) 90 tablet 0     metoprolol succinate ER (TOPROL-XL) 100 MG 24 hr tablet Take 1 tablet (100 mg) by mouth daily 30 tablet 11     metroNIDAZOLE (NORITATE) 1 % cream Apply topically daily       montelukast (SINGULAIR) 10 MG tablet Take 1 tablet (10 mg) by mouth At Bedtime 30 tablet 1     Multiple Vitamin (DAILY-GERALD) TABS Take 1 tablet by mouth daily  0     nitroGLYcerin (NITROSTAT) 0.4 MG sublingual tablet Place 1 tablet (0.4 mg) under the tongue every 5 minutes as needed for chest pain 25 tablet 1     nystatin (MYCOSTATIN) 453630 UNITS TABS tablet TK 2 TS PO BID  0     ondansetron (ZOFRAN-ODT) 8 MG ODT tab Take 1 tablet (8 mg) by mouth every 8 hours as needed for nausea 60 tablet 1     pantoprazole (PROTONIX) 40 MG EC tablet Take 1 tablet (40 mg) by mouth daily Pork free tablets. 30 tablet 1     pravastatin (PRAVACHOL) 40 MG tablet Take 1 tablet (40 mg) by mouth daily 90 tablet 2     ranitidine (ZANTAC) 150 MG tablet Take 1 tablet (150 mg) by mouth 2 times daily 180 tablet 1     spironolactone (ALDACTONE) 50 MG tablet Take 1 tablet (50 mg) by mouth daily . Take additional 0.5 tablets by mouth once daily as needed for weight gain. 90 tablet 2     sucralfate (CARAFATE) 1 GM tablet Take 1 tablet (1 g) by mouth 4 times daily 120 tablet 3     traMADol (ULTRAM) 50 MG tablet Take 1 tablet (50 mg) by mouth every 8 hours as needed for moderate pain 60 tablet 3     Triamcinolone Acetonide (NASACORT  "ALLERGY 24HR NA)        vitamin D (ERGOCALCIFEROL) 06172 UNIT capsule Take 1 capsule (50,000 Units) by mouth every 7 days Need a Vitamin D level in 2 months. (Patient taking differently: Take 50,000 Units by mouth every 7 days Need a Vitamin D level in 2 months.) 8 capsule 0     lifitegrast (XIIDRA) 5 % opthalmic solution Place 1 drop into both eyes 2 times daily (Patient not taking: Reported on 6/3/2019) 20 each 3     [] order for DME 1 Device once for 1 dose Equipment being ordered: Wedge pillow 1 each 0     No facility-administered encounter medications on file as of 2019.        Allergies   Allergen Reactions     Amoxicillin-Pot Clavulanate      Augmentin      Unknown possible hives and edema     Azithromycin      Diatrizoate Other (See Comments)     Pt wants this listed because she is allergic to shellfish      Imitrex [Sumatriptan]      Severe face/neck/chest tightness and flushing side effects      Penicillins Hives     Unknown      Pork Allergy      Stomach pain, cramping, diarrhea, itching, nausea and headaches     Shellfish Allergy Hives and Swelling     Sulfa Drugs Hives and Swelling     Zithromax [Azithromycin Dihydrate] Swelling     Unknown          Ph.E:    Vitals:    19 1430 19 1434   BP: 156/81 125/85   Pulse: 83    SpO2: 98%    Weight: 77.3 kg (170 lb 6.4 oz)    Height: 1.6 m (5' 3\")            Constitutional: WD/WN. Pleasant. In no acute distress.   Eyes: Swelling around R eye significantly improved since last visit. EOMI. Mild fullness still appreciable on R globe.  HEENT: No oral ulcers or thrush. Normal salivary pool.  Lymph: No cervical, supraclavicular, or axillary LAD.  Chest: Clear to auscultation bilaterally, Normal work of breathing.  CV: RRR, no murmurs/ rubs or gallops. No edema, clubbing or cyanosis.   GI: Abdomen is soft and non tender.  MS: No synovitis or joint tenderness. Cool joints. No joint deformities. Full ROM of the joints. No nodules.   Skin: No rashes " or lesions noted. No malar rash.  Neuro: A&O x 3. Grossly non focal, muscular power 5/5 in all ext  Psych: NL affect and mood    Assessment/ plan:    1-Seropositive non-erosive RA (arthritis, AM stiffness, high CRP, RF 26 but re-check neg 3/2015 on HCQ) Dx 5/2013, FMS, new pleuritic CP 3/20-15-5/2015 resolved on steroids. GPA. She is on  mg bid since 5/2014. She tolerates it well and it's helping some but not enough to control all her pain. Continues having flare ups of joint pain, swelling also has FMS. Joint sx are getting worse. Had high ESR/CRP in 3/2015 and new pleuritic CP between 3/2015-5/2015 which resolved after taking medrol dose pack prescribed 5/20/2015. Her pleuritic CP could be related to her RA. Then stopped tramadol as it blames it for recent episodes of CP.    In the past, MTX was recommended, she declined.    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 Etiology of mass unclear, but it's inflammatory and related to pt's underlying autoimmune disease (inflammatory arthritis, serositis). 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    Her work up showed borderline + ALLI and slightly elevated MPO. I told her prednisone is not good for her diabetes and weight gain. Recommended a trial of MTX as next step. Discussed risks on details. I called her neuro-ophthalmologist at last visit who agreed with trial of MTX (she suspects pseudo-sarcoidosis or sarcoid like disease but no granuloma and ACE not elevated).     Unfortunately despite all my efforts to explain risks vs benefits (more than risks) of MTX as steroid sparing gent, Janine declined to try MTX. I was very concerned about her R orbital inflammatory mass as there is high chance of flare up off steroids and steroid causes  her weigh gain and uncontrolled diabetes. I even offered her other steroid sparing gents like imuran. She declined that as well.    Her inflammation around R orbit has recurred now. On 4/7/2017, I spent quite amount of time discussing a trial of MTX. After discussing risks/benefits, she agreed to try it.     She is s/p Tx with MTX 10 mg po qwk 4/2017-5/2017. No benefits and more GI SEs, more hair loss and headaches since start of MTX. No benefits. I called and spoke with her neuro-ophthalmologist who recommended a second opinion from neuro-ophthalmology at the . I suspect her presentation to be ANCA vasculitis related but wanted to repeat imaging and get neuro-ophthalmology eval here. She was recommended to have repeat biopsy to r/o lymphoma.    In 5/2017, prescribed her prednisone 40-30-20-10 mg a day each for 3 days then stop (she declined higher dose and longer taper despite my concern for worsening swelling around orbit), Her R campos-orbital edema significantly improved. MTX was stopped in 5/2017 given side effects. Plan was to start imuran 50 mg po qd (NL TPMT); however we decided to hold off till she gets biopsy done.    Repeat R lacrimal gland biopsy in 8/2017 showed non specific inflammation, it ruled out lymphoma. Her R orbital swelling is improved but still present. After her biopsy I contacted her to schedule a f/u visit with me to discuss plan of care but she declined till today's visit.    She did not tolerate AZA because of GI SEs.    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     This is definitely an inflammatory process and is steroid responsive, she had local kenalog inj in 8/2017 at the time of biopsy which has helped. Given her diabetes and long term SE of prednisone,  highly recommend steroid sparing agent to prevent progression/recurrence of R campos-orbital swelling. Since she did not tolerate MTX and AZA, recommend rituximab as next step.     In 2/2018, I spent 30 minutes discussing test results, plan of care, rituximab SEs including rare risk of PML. She declined rituximab with concern for SEs.    Unfortunately lost f/u sine 2/2018. In 9/2018, was back with her R eye being much worse, swelling around R orbit was severe and is pushing down her eye. She decided to see an ophthalmologist at [facility], was seen 8/29, was told to have GPA.    Janine is frustrated with her eye condition, saying nobody told her that she has GPA or Wegener's which is a serious condition and people could die from it.    I reminded her that I discussed the Dx of ANCA-vasculitis which is just another name for Wegener's with her many times but she always declined induction Tx rituximab at last visit in 9/2018. I put her on prednisone 30 mg every day in 9/2018, now she is off, stopped 6 wk after surgery. Does not like SEs including worsening BG.    CT chest/abdomen/pelvis 4/2017 was neg for malignancy. 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 Repeat MPO was positive in 6/2017.    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 Shah and Dr. Valdez at [facility]. 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 Not happy with results, on my exam, her eye swelling/pain significantly improved. She wants to transfer care to here, I advised her to make an appointment with Dr. Sauol GREEN for f/u and referred her to Dr. Vasquez to assess if she has sinus involvement by GPA given facial pain.    After my original recommendation to receive rituximab (FDA approved for GPA) in 2/2018. She finally agreed to it, had 1 gr q2wk x 2 on 12/12/2018 and 12/26/2018. Had minor allergic reaction which was managed by extra dose of steroid and benadryl. She refuses to do prednisone taper given h/o diabetes, GIB on  prednisone. I told her it would take 1 mo for rituximab to show benefit, if she continues to have active disease, recommend trial of cytoxan.     Patient received Rituxan again (6/3/19 & 6/17/19) 6 months after first round. Repeat CT scan show continued inflammatory changes in the R orbit, but decreased proptosis. We discussed cytoxan again but she said it is out of question and she declined considering it. Therefore, will continue with q6mo rituximab with hope that inflammation goes down. Patient is not on any prednisone. Headaches have improved since surgery and rituximab treatment.  - Continue q6 month Rituximab infusions. Next due in December, 2019.  - Immunoglobulins, vasculitis labs, and rituximab monitoring labs ordered.    Continue accupuncture (referral was placed as it helps with chronic pain).     My impression is that her arthralgias are a combination of IA, fibromyalgia and chronic pain.  Stopped HCQ at last visit, shortly after resumed taking it as arthralgia recurred. Continue HCQ. Eye exam is updated.    2-Fad pads. She was seen by endocrinology and cushing was ruled out in 4/2014. Was advised to do f/u for enlarged thyroid/thyroid nodules.    3-Hair loss. F/U with dermatology    4-Chronic pain. F/U with pain clinic    5-Concerns of subclinical hyperthyroidism: TSH is barely minimal, chart review shows that those lowest levels are since April 2014. At last visit, discussed Endocrinology ref which pt wants to hold off in this visit.     6-Vit D deficiency. Was replaced with vit D 50, 000 units po qwk x 12 wk then 2000 units every day    7-Upper extremity swelling. Recent mammogram without suggestion of malignancy. No LAD of axillary region. Arms appear normal on physical exam, however patient reports intermittent swelling.  - Referral to lymphedema clinic.      PLAN: Follow up in 4-5 months.    MEDICATION CHANGES: none. Plan to repeat rituximab in 12/2019    Patient was seen and plan formulated with  staff rheumatologist, Dr. Mckinnon.    Sadi Lee  Internal Medicine, PGY3      Attending Note: I saw and evaluated the patient with Dr. Lee. I agree with the assessment and plan.    Majo Mckinnon MD      Orders Placed This Encounter   Procedures     CBC with platelets differential     CRP inflammation     Routine UA with Micro Reflex to Culture     Protein  random urine with Creat Ratio     Creatinine random urine     Erythrocyte sedimentation rate auto     ANCA IgG by IFA with Reflex to Titer     ANCA Vasculitis panel     Immunoglobulins A G and M     CD19 B Cell Count (B-lymphocyte antigen)     LYMPHEDEMA THERAPY REFERRAL

## 2019-07-12 NOTE — LETTER
Patient:  Janine Cornell  :   1965  MRN:     [id number]        Ms.Lisa CHELLE Cornell  [address] [facility] [address]        July 15, 2019    Dear ,    We are writing to inform you of your test results. Labs are stable. You have normal inflammation markers and improved vasculitis marker (good news!)      Results for orders placed or performed in visit on 19   CD19 B Cell Count (B-lymphocyte antigen)   Result Value Ref Range    CD19 B Cells <1 (L) 6 - 27 %    Absolute CD19 <1 (L) 107 - 698 cells/uL   Immunoglobulins A G and M   Result Value Ref Range     695 - 1,620 mg/dL     70 - 380 mg/dL    IGM 46 (L) 60 - 265 mg/dL   ANCA Vasculitis panel   Result Value Ref Range    Myeloperoxidase Antibody IgG 1.3 (H) 0.0 - 0.9 AI    Proteinase 3 Antibody IgG <0.2 0.0 - 0.9 AI   ANCA IgG by IFA with Reflex to Titer   Result Value Ref Range    Neutrophil Cytoplasmic Antibody <1:10 <1:10 [titer]    Neutrophil Cytoplasmic Antibody Pattern       The ANCA IFA is <1:10.  No further testing will be performed.   Erythrocyte sedimentation rate auto   Result Value Ref Range    Sed Rate 10 0 - 30 mm/h   CRP inflammation   Result Value Ref Range    CRP Inflammation <2.9 0.0 - 8.0 mg/L   CBC with platelets differential   Result Value Ref Range    WBC 10.4 4.0 - 11.0 10e9/L    RBC Count 4.89 3.8 - 5.2 10e12/L    Hemoglobin 12.9 11.7 - 15.7 g/dL    Hematocrit 40.6 35.0 - 47.0 %    MCV 83 78 - 100 fl    MCH 26.4 (L) 26.5 - 33.0 pg    MCHC 31.8 31.5 - 36.5 g/dL    RDW 13.5 10.0 - 15.0 %    Platelet Count 341 150 - 450 10e9/L    Diff Method Automated Method     % Neutrophils 69.9 %    % Lymphocytes 17.0 %    % Monocytes 7.8 %    % Eosinophils 4.2 %    % Basophils 0.7 %    % Immature Granulocytes 0.4 %    Nucleated RBCs 0 0 /100    Absolute Neutrophil 7.3 1.6 - 8.3 10e9/L    Absolute Lymphocytes 1.8 0.8 - 5.3 10e9/L    Absolute Monocytes 0.8 0.0 - 1.3 10e9/L    Absolute Eosinophils 0.4 0.0 - 0.7 10e9/L     Absolute Basophils 0.1 0.0 - 0.2 10e9/L    Abs Immature Granulocytes 0.0 0 - 0.4 10e9/L    Absolute Nucleated RBC 0.0    Protein  random urine with Creat Ratio   Result Value Ref Range    Protein Random Urine 0.26 g/L    Protein Total Urine g/gr Creatinine 0.14 0 - 0.2 g/g Cr   Creatinine random urine   Result Value Ref Range    Creatinine Urine Random 194 mg/dL   UA with Microscopic reflex to Culture   Result Value Ref Range    Color Urine Yellow     Appearance Urine Clear     Glucose Urine Negative NEG^Negative mg/dL    Bilirubin Urine Negative NEG^Negative    Ketones Urine Negative NEG^Negative mg/dL    Specific Gravity Urine 1.021 1.003 - 1.035    Blood Urine Negative NEG^Negative    pH Urine 6.0 5.0 - 7.0 pH    Protein Albumin Urine Negative NEG^Negative mg/dL    Urobilinogen mg/dL 0.0 0.0 - 2.0 mg/dL    Nitrite Urine Negative NEG^Negative    Leukocyte Esterase Urine Negative NEG^Negative    Source Midstream Urine     WBC Urine <1 0 - 5 /HPF    RBC Urine 2 0 - 2 /HPF    Squamous Epithelial /HPF Urine 1 0 - 1 /HPF    Mucous Urine Present (A) NEG^Negative /LPF   Creatinine urine calculation only   Result Value Ref Range    Creatinine Urine 194 mg/dL     *Note: Due to a large number of results and/or encounters for the requested time period, some results have not been displayed. A complete set of results can be found in Results Review.       Majo Mckinnon MD

## 2019-07-12 NOTE — NURSING NOTE
Chief Complaint   Patient presents with     RECHECK     Seronegative rheumatoid arthritis      Vane Greer MA

## 2019-07-13 LAB
CD19 CELLS # BLD: <1 CELLS/UL (ref 107–698)
CD19 CELLS NFR BLD: <1 % (ref 6–27)
MYELOPEROXIDASE AB SER-ACNC: 1.3 AI (ref 0–0.9)
PROTEINASE3 IGG SER-ACNC: <0.2 AI (ref 0–0.9)

## 2019-07-15 DIAGNOSIS — E78.5 HYPERLIPIDEMIA LDL GOAL <70: ICD-10-CM

## 2019-07-15 DIAGNOSIS — I10 HTN (HYPERTENSION): ICD-10-CM

## 2019-07-15 LAB
ANCA AB PATTERN SER IF-IMP: NORMAL
C-ANCA TITR SER IF: NORMAL {TITER}
IGA SERPL-MCNC: 233 MG/DL (ref 70–380)
IGG SERPL-MCNC: 760 MG/DL (ref 695–1620)
IGM SERPL-MCNC: 46 MG/DL (ref 60–265)

## 2019-07-16 NOTE — TELEPHONE ENCOUNTER
Called and spoke with pharmacy staff.  Discussed MINA and if there was a need for brand.  No need for MINA.  New prescription sent.    Selena Underwood LPN

## 2019-07-30 DIAGNOSIS — M31.30 WEGENER'S VASCULITIS: ICD-10-CM

## 2019-07-31 RX ORDER — ACETAMINOPHEN 325 MG/1
325-650 TABLET ORAL EVERY 6 HOURS PRN
Qty: 250 TABLET | Refills: 0 | Status: SHIPPED | OUTPATIENT
Start: 2019-07-31 | End: 2020-03-03

## 2019-09-09 ENCOUNTER — TELEPHONE (OUTPATIENT)
Dept: RHEUMATOLOGY | Facility: CLINIC | Age: 54
End: 2019-09-09

## 2019-09-09 NOTE — TELEPHONE ENCOUNTER
Pt called and was wondering if she needs to continue on high dose of vitamin D?  She just ran out, but it has been a while since she has had her level checked.  She is wondering what Dr. Mckinnon thinks she should do?    Desiree Godinez RN  Rheumatology Clinic

## 2019-09-12 ENCOUNTER — MYC MEDICAL ADVICE (OUTPATIENT)
Dept: RHEUMATOLOGY | Facility: CLINIC | Age: 54
End: 2019-09-12

## 2019-09-12 DIAGNOSIS — M19.90 INFLAMMATORY ARTHRITIS: Primary | ICD-10-CM

## 2019-09-12 NOTE — TELEPHONE ENCOUNTER
9/12/2019  11:05 AM          RN Care Coordinator Encounter  Attempted to connect with patient for updates on provider recommendations regarding the Vitamin D dosing and labs. No answer. LVM for patient to call back the Rheumatology clinic. Will send patient a [facility] message also.                 Jane Gutierrez RN, BSN, PHN  [facility]  RN Care Coordinator Medicine Specialty Pool Nurse   (Nephrology, Rheumatology, Infectious Disease & Hepatology)

## 2019-09-30 ENCOUNTER — HEALTH MAINTENANCE LETTER (OUTPATIENT)
Age: 54
End: 2019-09-30

## 2019-09-30 DIAGNOSIS — I21.4 NSTEMI (NON-ST ELEVATED MYOCARDIAL INFARCTION) (H): ICD-10-CM

## 2019-09-30 RX ORDER — NITROGLYCERIN 0.4 MG/1
0.4 TABLET SUBLINGUAL EVERY 5 MIN PRN
Qty: 25 TABLET | Refills: 1 | Status: SHIPPED | OUTPATIENT
Start: 2019-09-30 | End: 2020-02-06

## 2019-09-30 NOTE — TELEPHONE ENCOUNTER
[facility] Call Center    Phone Message    May a detailed message be left on voicemail: yes    Reason for Call: Medication Refill Request    Has the patient contacted the pharmacy for the refill? Yes   Name of medication being requested: Nitroglycerin  Provider who prescribed the medication: Kobi  Pharmacy: [id number] [place], [place] - [address] AT [address] [place] [address] & [facility]  Date medication is needed: as soon as possible pt is completely out          Action Taken: Message routed to:  [facility] ([facility] cardio

## 2019-09-30 NOTE — TELEPHONE ENCOUNTER
nitroGLYcerin (NITROSTAT) 0.4 MG sublingual tablet  Last Written Prescription Date:  12/20/17  Last Fill Quantity: 25,   # refills: 1  Last Office Visit : 7/11/19  Future Office visit:  none

## 2019-10-29 ENCOUNTER — OFFICE VISIT (OUTPATIENT)
Dept: RHEUMATOLOGY | Facility: CLINIC | Age: 54
End: 2019-10-29
Attending: INTERNAL MEDICINE
Payer: COMMERCIAL

## 2019-10-29 VITALS
WEIGHT: 166.8 LBS | SYSTOLIC BLOOD PRESSURE: 122 MMHG | HEART RATE: 79 BPM | DIASTOLIC BLOOD PRESSURE: 75 MMHG | BODY MASS INDEX: 29.55 KG/M2 | OXYGEN SATURATION: 99 % | TEMPERATURE: 98.2 F

## 2019-10-29 DIAGNOSIS — M31.30 GRANULOMATOSIS WITH POLYANGIITIS WITHOUT RENAL INVOLVEMENT (H): Primary | ICD-10-CM

## 2019-10-29 DIAGNOSIS — M31.30 GRANULOMATOSIS WITH POLYANGIITIS WITHOUT RENAL INVOLVEMENT (H): ICD-10-CM

## 2019-10-29 DIAGNOSIS — M19.90 INFLAMMATORY ARTHRITIS: ICD-10-CM

## 2019-10-29 DIAGNOSIS — M35.9 UNDIFFERENTIATED CONNECTIVE TISSUE DISEASE (H): ICD-10-CM

## 2019-10-29 LAB
ALBUMIN SERPL-MCNC: 4.3 G/DL (ref 3.4–5)
ALBUMIN UR-MCNC: NEGATIVE MG/DL
ALT SERPL W P-5'-P-CCNC: 26 U/L (ref 0–50)
APPEARANCE UR: CLEAR
AST SERPL W P-5'-P-CCNC: 17 U/L (ref 0–45)
BASOPHILS # BLD AUTO: 0.1 10E9/L (ref 0–0.2)
BASOPHILS NFR BLD AUTO: 0.5 %
BILIRUB UR QL STRIP: NEGATIVE
COLOR UR AUTO: YELLOW
CREAT SERPL-MCNC: 1.04 MG/DL (ref 0.52–1.04)
CREAT UR-MCNC: 207 MG/DL
CRP SERPL-MCNC: <2.9 MG/L (ref 0–8)
DIFFERENTIAL METHOD BLD: ABNORMAL
EOSINOPHIL # BLD AUTO: 0.4 10E9/L (ref 0–0.7)
EOSINOPHIL NFR BLD AUTO: 3.5 %
ERYTHROCYTE [DISTWIDTH] IN BLOOD BY AUTOMATED COUNT: 13.8 % (ref 10–15)
ERYTHROCYTE [SEDIMENTATION RATE] IN BLOOD BY WESTERGREN METHOD: 10 MM/H (ref 0–30)
FERRITIN SERPL-MCNC: 67 NG/ML (ref 8–252)
GFR SERPL CREATININE-BSD FRML MDRD: 61 ML/MIN/{1.73_M2}
GLUCOSE UR STRIP-MCNC: 50 MG/DL
HCT VFR BLD AUTO: 39.1 % (ref 35–47)
HGB BLD-MCNC: 12.3 G/DL (ref 11.7–15.7)
HGB UR QL STRIP: NEGATIVE
HYALINE CASTS #/AREA URNS LPF: 5 /LPF (ref 0–2)
IMM GRANULOCYTES # BLD: 0 10E9/L (ref 0–0.4)
IMM GRANULOCYTES NFR BLD: 0.3 %
KETONES UR STRIP-MCNC: 5 MG/DL
LEUKOCYTE ESTERASE UR QL STRIP: NEGATIVE
LYMPHOCYTES # BLD AUTO: 1.7 10E9/L (ref 0.8–5.3)
LYMPHOCYTES NFR BLD AUTO: 14.8 %
MAGNESIUM SERPL-MCNC: 1.7 MG/DL (ref 1.6–2.3)
MCH RBC QN AUTO: 26 PG (ref 26.5–33)
MCHC RBC AUTO-ENTMCNC: 31.5 G/DL (ref 31.5–36.5)
MCV RBC AUTO: 83 FL (ref 78–100)
MONOCYTES # BLD AUTO: 0.6 10E9/L (ref 0–1.3)
MONOCYTES NFR BLD AUTO: 5.3 %
MUCOUS THREADS #/AREA URNS LPF: PRESENT /LPF
NEUTROPHILS # BLD AUTO: 8.8 10E9/L (ref 1.6–8.3)
NEUTROPHILS NFR BLD AUTO: 75.6 %
NITRATE UR QL: NEGATIVE
NRBC # BLD AUTO: 0 10*3/UL
NRBC BLD AUTO-RTO: 0 /100
PH UR STRIP: 5 PH (ref 5–7)
PLATELET # BLD AUTO: 371 10E9/L (ref 150–450)
PROT UR-MCNC: 0.26 G/L
PROT/CREAT 24H UR: 0.13 G/G CR (ref 0–0.2)
RBC # BLD AUTO: 4.73 10E12/L (ref 3.8–5.2)
RBC #/AREA URNS AUTO: 2 /HPF (ref 0–2)
SOURCE: ABNORMAL
SP GR UR STRIP: 1.02 (ref 1–1.03)
UROBILINOGEN UR STRIP-MCNC: 0 MG/DL (ref 0–2)
VIT B12 SERPL-MCNC: 592 PG/ML (ref 193–986)
WBC # BLD AUTO: 11.6 10E9/L (ref 4–11)
WBC #/AREA URNS AUTO: <1 /HPF (ref 0–5)

## 2019-10-29 PROCEDURE — 86255 FLUORESCENT ANTIBODY SCREEN: CPT | Performed by: INTERNAL MEDICINE

## 2019-10-29 PROCEDURE — 82728 ASSAY OF FERRITIN: CPT | Performed by: INTERNAL MEDICINE

## 2019-10-29 PROCEDURE — 82040 ASSAY OF SERUM ALBUMIN: CPT | Performed by: INTERNAL MEDICINE

## 2019-10-29 PROCEDURE — 82607 VITAMIN B-12: CPT | Performed by: INTERNAL MEDICINE

## 2019-10-29 PROCEDURE — 36415 COLL VENOUS BLD VENIPUNCTURE: CPT | Performed by: INTERNAL MEDICINE

## 2019-10-29 PROCEDURE — 85025 COMPLETE CBC W/AUTO DIFF WBC: CPT | Performed by: INTERNAL MEDICINE

## 2019-10-29 PROCEDURE — 84450 TRANSFERASE (AST) (SGOT): CPT | Performed by: INTERNAL MEDICINE

## 2019-10-29 PROCEDURE — 82784 ASSAY IGA/IGD/IGG/IGM EACH: CPT | Performed by: INTERNAL MEDICINE

## 2019-10-29 PROCEDURE — 85652 RBC SED RATE AUTOMATED: CPT | Performed by: INTERNAL MEDICINE

## 2019-10-29 PROCEDURE — 86140 C-REACTIVE PROTEIN: CPT | Performed by: INTERNAL MEDICINE

## 2019-10-29 PROCEDURE — 82306 VITAMIN D 25 HYDROXY: CPT | Performed by: INTERNAL MEDICINE

## 2019-10-29 PROCEDURE — 83516 IMMUNOASSAY NONANTIBODY: CPT | Performed by: INTERNAL MEDICINE

## 2019-10-29 PROCEDURE — 83876 ASSAY MYELOPEROXIDASE: CPT | Performed by: INTERNAL MEDICINE

## 2019-10-29 PROCEDURE — 84156 ASSAY OF PROTEIN URINE: CPT | Performed by: INTERNAL MEDICINE

## 2019-10-29 PROCEDURE — 82565 ASSAY OF CREATININE: CPT | Performed by: INTERNAL MEDICINE

## 2019-10-29 PROCEDURE — 84460 ALANINE AMINO (ALT) (SGPT): CPT | Performed by: INTERNAL MEDICINE

## 2019-10-29 PROCEDURE — 83735 ASSAY OF MAGNESIUM: CPT | Performed by: INTERNAL MEDICINE

## 2019-10-29 PROCEDURE — 81001 URINALYSIS AUTO W/SCOPE: CPT | Performed by: INTERNAL MEDICINE

## 2019-10-29 PROCEDURE — 86355 B CELLS TOTAL COUNT: CPT | Performed by: INTERNAL MEDICINE

## 2019-10-29 RX ORDER — LIDOCAINE 40 MG/G
CREAM TOPICAL
Qty: 30 G | Refills: 3 | Status: ON HOLD | OUTPATIENT
Start: 2019-10-29 | End: 2021-05-25

## 2019-10-29 RX ORDER — TRAMADOL HYDROCHLORIDE 50 MG/1
50 TABLET ORAL EVERY 8 HOURS PRN
Qty: 60 TABLET | Refills: 3 | Status: SHIPPED | OUTPATIENT
Start: 2019-10-29 | End: 2020-01-06

## 2019-10-29 RX ORDER — HYDROXYCHLOROQUINE SULFATE 200 MG/1
400 TABLET, FILM COATED ORAL DAILY
Qty: 180 TABLET | Refills: 1 | Status: SHIPPED | OUTPATIENT
Start: 2019-10-29 | End: 2020-06-02

## 2019-10-29 RX ORDER — LIDOCAINE 40 MG/G
CREAM TOPICAL
Qty: 30 G | Refills: 3 | Status: SHIPPED | OUTPATIENT
Start: 2019-10-29 | End: 2019-10-29

## 2019-10-29 RX ORDER — LIDOCAINE 50 MG/G
1 PATCH TOPICAL EVERY 24 HOURS
Qty: 30 PATCH | Refills: 3 | Status: SHIPPED | OUTPATIENT
Start: 2019-10-29 | End: 2019-10-29

## 2019-10-29 ASSESSMENT — PAIN SCALES - GENERAL: PAINLEVEL: MODERATE PAIN (5)

## 2019-10-29 NOTE — PROGRESS NOTES
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 Cornell is a 51 yo AAF who was referred to our clinic for evaluation and management of her joint pain in setting of positive RF 26.    Her joint symptoms first started more than a year ago, but over last 6-8 months fluctuating some good and bad days . All her joints are involved. Over last 5 months, hands became swollen, warm and painful. Tylenol does not help. Ketoprofen did not help. Was told to avoid NSAIDs given ACS. Reports AM/PM stiffness x 2-3 hr, can't make full fist when wakes up in AM.    She feels tired all the time. Reports worsening hair loss and unchanged  facial rash. Gets red sore flaky rash over cheeks across her face which is intermittent diagnosed Rosacea and is prescribed metronidazole gel which she has not started using. Reports occasional oral ulcers. Hands feel cold with red discoloration last winter. Has occasional dysphagia to both solids and liquid. Has dry eyes, is using OTC allergy eyedrops. Has chronic abdominal pain. Has h/o pancreatitis. Sometime has anxiety. Occasionally has numbness, tingling in fingers/toes. Has back and spine issues, her whole back and neck hurts, different areas at different time. She is wondering if she has FMS. Has hard time sleeping, has never been diagnosed with BRENNA. She does not know if he snores. She was found to have slightly positive RF in 2/2013. Has microcytic anemia.     Her arthralgia gets worse with drop in temperature. Reports body ache. Activity makes her pain worse. Her joint swelling isintermittent. Her body pain and joint pain is the same. Reports AM stiffness x 3 hr.    She has been doing acupuncture x few months and it is helping with her pain. She thinks that the combination of plaquenil and acupuncture is helpful but overall she notice  30% in her symptoms relief.     Takes tylenol and tramadol on occasion for pain.    She decided to use different formulation of metformin which helped with her abdominal pain. I referred her to GI for evaluation of elevated lipase and abdominal pain. She decided to see GI in the future.     She is on  mg qd since 5/2014, tolerates it well and it helps her some. Denies taking any gabapentin due to chest pain and headches. She has had referral her for water aerobics, but she can't begin until she's healed from recent surgery in 10/2014. She thinks HCQ is helping but not enough to control all her pain, reports 2-3 hr of AM stiffness with ongoing diffuse arthralgia/myalgia along with intermittent swelling of hands. She does see her acupuncture person and it helps with her pain, has not started water aerobics yet. Has got her flu shot.     10/2015: Reports having pleuritic CP in 3/2015, was prescribed Lidoderm patches first. It did not help. Took prednisone (?dose) by her own, pain got better but it recurred off prednisone and gradually got worse to the point that she could not stand the pain anymore, was seen in ER in 5/2015, was treated with medrol dose pack which helped. Reports pain over hips, knees, ankles and fingers. Pain gets worse with walking. Reports myalgia, swelling of the arms. Pain over neck, shoulders. Has AM stiffness x 3-4 hr. Fingers swell up and become painful. Can't remember if steroid helped with joint pain. She had headaches, severe CP when she took tramadol and gabapentin and stopped taking them, sx resolved but she can't tell which one caused SEs but thinks that probably it was gabapentin. She has good days and tries to be more active but activity aggravates the pain. Headaches are intermittent. HCQ helps some not enough to control all the pain. No HCQ SEs. Had eye exam around July 2015. Flexeril helps but PCP wants to change flexeril to zanaflex, reporting that she is NOT comfortable with  the change. Acupuncture still helps an she continued to  do that. Concerned about fat pads she has in supraclavicular area, has h/o thyroid nodules. Continues having hair loss, takes iron for iron deficiency.     2/2016: She has 2 major complaints today, increased joint pain/swelling in hands/feet and recent Dx of optic neuritis in R eye, reports having similar problem about 20 yr ago, now recurred. Has a spot in R eye vision x long term, was seen by ophthalmology few days ago and was told to have optic neuritis. Gets joint pain, muscle pain. Can't  objects, hands are swollen. Knees, hips and ankles are painful. Reports more arthralgia. AM stiffness/ pain is 3-4 hr. She wants me to talk to her ophthalmologist directly.    She had botox inj for migraines which did not help her. She is going to have angiogram next wk, had to take more nitro for CP recently.     4/29/2016: She reports being on steroid taper x 3 since last visit. Reportedly, had orbit MRI, it showed swelling/inflamamtion of R lacrimal glands. She had painful swelling of her R eye, cause her headaches. Botox inj made her headaches worse. She is being dealing with this since 1/2016, with severe headaches and pain/swelling around R eye. Had biopsy in 3/2016. She is frustrated as prednisone causes her weight gain and her BG is difficult to control on it.    5/2016: At last visit, was prescribed MTX 10 mg po qwk to take along with FA 1 mg qd. She decided not to take MTX, is afraid of side effects, believes all these medications would harm her. She also believes that botox caused her inflammation around R eye. No major flare up of per-orbital inflamamtion since last visit but continues to have swelling around her R eye.    11/2016: Reports diffuse body ache, arthralgia, myalgia especially with weather change. No major flare up of campos-orbital inflammation but her face/around R eye swells up from time to time. Reports 2 episodes of CP over past 2 mo, nitro  sometimes helps and sometimes does not help. She has asthma and costochondritis and she has hard time to distinguish the origin of pain. Sometimes knees and fingers swell up. Her stiffness is sometimes all day.    4/2017:  Reports having sinusitis since last visit. Her R eye is dry and is using eyedrops to keep it moist. Reports having pain in ears. Was treated with antibiotics by PCP, it got better then it got worse. Reports catching infections easily. Then started having pressure over eyes with pain/headaches (exact start date is unknown). Pain gradually got worse and became persistent. Had sinus CT.     4/7/2017: Having a flare up of swelling, pain around her R orbit. Has not tried MTX yet.    5/2017: On MTX 10 mg po qwk since 4/7/2017, reports increased stomach pain and nausea and new headaches since start of MTX and it's not helping. Pain/swelling around R orbit is worse.    6/2017: She finished prednisone taper prescribed at last visit, R campos-orbital swelling significantly improved. 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    2/2018: R campos-orbital swelling has improved. Repeat R lacrimal gland biopsy in 8/2017 showed non specific inflammation with no lymphoma. She is off steroid. Did not tolerate AZA due to N/V and abdominal pain.    Is back with recurrence of R campos-orbital sweling x past 2 months. Pain really got worse over past 3wk, requries     9/2018: Declined ritiximab at last visit, lost f/u since 2/2018. Went to [place] and was seen on 8/29 by Dr. 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 Reportedly he ordered labs and recommended surgery since the inflammation of the R eye is back and is pushing the eye down. Janine took some prednsione 30 mg every day few days a go for pain.    3/2019: She had lateral orbitotomy and  debulking orbital mass right eye on 9/26/18 with Dr. Shah and Dr. Valdez at [place]. 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 There were no complications according to the op note.    Janine finally agreed to receive rituximab for her GPA. She had it as 1 gr q2wk x 2 on 12/12/2018 and 12/26/2018. Had minor allergic reaction which was managed by IV solu cortef and benadryl. She is off prednisone about 6wk after surgery. Had bleeding ulcer from prednsione. Has pain over sinus, ears. Still has residual pain, swelling around her R eye is concerned about it. Wants to transfer her care to ophthalmology here as it's closer to her.    Cold induced sweating congestion joint pain  Facial swelling  allegy doctor clear ear pft ok doxy sinusitis     (7/12/19):  Patient completed her 2 rounds of rituximab in June. She tolerated well. She was recently seen by Dr. Vernon in Op[facility] and repeat CT scan of orbits shows some decrease in proptosis. She reports that her R eye still intermittently feels puffy. She also mentions intermittent swelling in her arms. Unclear of any triggers. Limbs are also heavy with muscle aches and some joint pains. Exacerbated with activity.    Today 10/29/2019:    Not feeling well with headaches, diffuse arthralgia and myalgia being worse over past 2 months. Headaches wax and wane and never go away. Gets swelling around her R eyes with numbness/tingling of R cheek.    Gets hives randomly, over trunk, extremities.    Has shortness of breath on walking fast. Gets cough from asthma, nothing new. No hemoptysis. Fingers start to hurt and itch with drop in T.    Component      Latest Ref Rng 2/28/2013 2/28/2013          12:14 PM 12:14 PM   WBC      4.0 - 11.0 10e9/L     RBC Count      3.8 - 5.2 10e12/L     Hemoglobin      11.7 - 15.7 g/dL     Hematocrit      35.0 - 47.0 %     MCV      78 - 100 fl     MCH      26.5 - 33.0 pg     MCHC      31.5 - 36.5 g/dL     RDW      10.0 - 15.0 %     Platelet  Count      150 - 450 10e9/L     Specimen Description           Lyme Screen IgG and IgM           Vitamin D Deficiency screening      30 - 75 ug/L     Ferritin      10 - 300 ng/mL     Sed Rate      0 - 20 mm/h     ALLI Screen by EIA      <1.0     Rheumatoid Factor      0 - 14 IU/mL     CK Total      30 - 225 U/L  78   Uric Acid      2.5 - 7.5 mg/dL 6.7      Component      Latest Ref Rn 2/28/2013          12:14 PM   WBC      4.0 - 11.0 10e9/L    RBC Count      3.8 - 5.2 10e12/L    Hemoglobin      11.7 - 15.7 g/dL    Hematocrit      35.0 - 47.0 %    MCV      78 - 100 fl    MCH      26.5 - 33.0 pg    MCHC      31.5 - 36.5 g/dL    RDW      10.0 - 15.0 %    Platelet Count      150 - 450 10e9/L    Specimen Description       Serum   Lyme Screen IgG and IgM       Test value: <0.75....Interpretation: Negative....If you highly suspect Lyme . . .   Vitamin D Deficiency screening      30 - 75 ug/L    Ferritin      10 - 300 ng/mL    Sed Rate      0 - 20 mm/h    ALLI Screen by EIA      <1.0    Rheumatoid Factor      0 - 14 IU/mL    CK Total      30 - 225 U/L    Uric Acid      2.5 - 7.5 mg/dL      Component      Latest Ref Rn 2/28/2013 2/28/2013 2/28/2013 2/28/2013          12:14 PM 12:14 PM 12:14 PM 12:14 PM   WBC      4.0 - 11.0 10e9/L       RBC Count      3.8 - 5.2 10e12/L       Hemoglobin      11.7 - 15.7 g/dL       Hematocrit      35.0 - 47.0 %       MCV      78 - 100 fl       MCH      26.5 - 33.0 pg       MCHC      31.5 - 36.5 g/dL       RDW      10.0 - 15.0 %       Platelet Count      150 - 450 10e9/L       Specimen Description             Lyme Screen IgG and IgM             Vitamin D Deficiency screening      30 - 75 ug/L       Ferritin      10 - 300 ng/mL    10   Sed Rate      0 - 20 mm/h   23 (H)    ALLI Screen by EIA      <1.0  <1.0 . . .     Rheumatoid Factor      0 - 14 IU/mL 26 (H)      CK Total      30 - 225 U/L       Uric Acid      2.5 - 7.5 mg/dL         Component      Latest Ref Rn 2/28/2013 2/28/2013           12:14 PM 12:14 PM   WBC      4.0 - 11.0 10e9/L 14.1 (H)    RBC Count      3.8 - 5.2 10e12/L 4.55    Hemoglobin      11.7 - 15.7 g/dL 10.7 (L)    Hematocrit      35.0 - 47.0 % 33.3 (L)    MCV      78 - 100 fl 73 (L)    MCH      26.5 - 33.0 pg 23.5 (L)    MCHC      31.5 - 36.5 g/dL 32.1    RDW      10.0 - 15.0 % 18.1 (H)    Platelet Count      150 - 450 10e9/L 407    Specimen Description           Lyme Screen IgG and IgM           Vitamin D Deficiency screening      30 - 75 ug/L  32   Ferritin      10 - 300 ng/mL     Sed Rate      0 - 20 mm/h     ALLI Screen by EIA      <1.0     Rheumatoid Factor      0 - 14 IU/mL     CK Total      30 - 225 U/L     Uric Acid      2.5 - 7.5 mg/dL          MRI CERVICAL SPINE WITHOUT CONTRAST 4/3/2013 12:47 PM    HISTORY: Cervicalgia. Degeneration of cervical intervertebral disc.  MRI cervical spine. Evaluate bilateral supraclavicular lymph nodes.  Clinical enlargement.    TECHNIQUE: Multiplanar multisequence MRI of the cervical spine  without gadolinium contrast.    COMPARISON: None.    FINDINGS: The patient has a developmentally small central canal.  Images of the cervical cord reveals small areas of T2 hyperintensity  within the cervical cord. There is a small area of high signal  intensity at the C1 level. There is also a small area of high signal  intensity at the C6-C7 level. The area of high signal at C6-C7 is  located at an area of central spinal stenosis. This may be due to  myelomalacia. The area of high signal at C1-C2 is indeterminate.    C2-C3: Normal disc, facet joints, spinal canal and neural foramina.    C3-C4: Normal disc, facet joints, spinal canal and neural foramina.    C4-C5: Normal disc, facet joints, spinal canal and neural foramina.    C5-C6: There is degeneration of the disc. There is a mild annular  disc bulge. The central canal is mild-moderately narrowed. The  residual AP diameter of the central spinal canal measures  approximately 9 mm.    C6-C7: There is  degeneration of the disc. There is loss of disc space  height. There is a diffuse annular disc bulge. There are associated  posterior osteophytes. There are severe central spinal stenosis at  this level. The residual AP diameter of the central spinal canal is  only about 6 mm. There is significant flattening of the cord. There  is high signal in the cord at this level consistent with  myelomalacia. There is moderate to severe right foraminal stenosis  due to and uncinate spur.    C7-T1: Normal disc, facet joints, spinal canal and neural foramina.            Result Impression       IMPRESSION:  1. Severe central spinal stenosis at C6-C7 due to a developmentally  small canal and due to a bulging disc and associated posterior  osteophyte. There is flattening of the cord at this level and high  signal in the cord at this level consistent with myelomalacia.  2. There is a small, indeterminate 2-3 mm area of high signal  intensity in the cord at the C1 level. This could be due to gliosis  secondary to a previous infectious or inflammatory process.  Demyelinating disease could also have a similar appearance. Clinical  correlation suggested.    GAIL MORE MD     MRI LEFT UPPER EXTREMITY NON-JOINT WITHOUT CONTRAST, MRI RIGHT UPPER  EXTREMITY NON-JOINT WITHOUT CONTRAST April 3, 2013 1:32 PM    HISTORY: Cervicalgia. Degeneration of cervical intervertebral disc.    TECHNIQUE: Multiplanar, multisequence imaging of the brachial plexus  was performed without gadolinium contrast enhancement.    COMPARISON: None.    FINDINGS: No mass lesions are seen. No inflammatory process is  identified. The roots, trunks, branches, and divisions of both the  right and left brachial plexus appear normal. No adenopathy is seen.  The anterior scalene muscles and the middle scalene muscles appear  normal. Nodules are seen within the thyroid gland. The largest nodule  is seen in the left lobe of the thyroid. This measures about 1.8 cm  in diameter.             Result Impression       IMPRESSION:  1. Both the right and left brachial plexus regions appear normal.  2. Thyroid nodules. The largest nodule is in the left lobe of the  thyroid. It measures about 1.8 cm in diameter.       XR WRIST BILATERAL G/E 3 VIEWS    Narrative:        EXAMINATION:  1. Each hand 3 views  2. Each wrist 3 views     DATE: 5/1/2013     HISTORY: Arthropathy with concern for rheumatoid.     FINDINGS: 3 views of each hand and 3 views of each wrist are obtained  without prior for comparison. Alignment is normal. There is no  fracture or acute bone abnormality. No distinct erosions are seen.  Some spurring of the distal radial ulnar joint is noted on the right.       Impression:     IMPRESSION:  1. No evidence of an inflammatory arthropathy involving either hand  or wrist.     VIELKA GUEVARA MD         XR FOOT BILATERAL G/E 3 VIEWS    Narrative:        EXAMINATION:  1. Each foot 3 views  2. Each ankle 3 views  3. Sacroiliac joint series     DATE: 5/1/2013     HISTORY: Arthropathy; concern for rheumatoid.     FINDINGS: No prior for comparison.     A frontal and bilateral oblique evaluation of the sacroiliac joints  shows no malalignment. Some patchy sclerosis is identified along the  central aspect of both sacroiliac joints, which is favored to be  degenerative. No distinct erosions are seen. The visualized portion  of the hip joint spaces appear maintained, with no erosive changes  identified. Mild endplate spurring is noted in the lower lumbar  spine.     3 views of each foot and 3 views of each ankle show no evidence of  acute fracture or dislocation. The metatarsal phalangeal,  tarsometatarsal and intertarsal joint spaces appear maintained. No  erosions are identified. There is no sign of acroosteolysis. The  ankle mortise and talar dome are intact bilaterally. Minimal spurring  is noted along the tip of the medial malleolus bilaterally.       Impression:     IMPRESSION:  1. Patchy sclerosis  identified along the central aspect of both  sacroiliac joints, which is favored to be degenerative. No distinct  erosions are seen.  2. No evidence of an inflammatory arthropathy in either foot or ankle.     VIELKA GUEVARA MD     5/2013  CBC WITH PLATELETS DIFFERENTIAL       Component Value Range    WBC 11.3 (*) 4.0 - 11.0 10e9/L    RBC Count 4.56  3.8 - 5.2 10e12/L    Hemoglobin 11.1 (*) 11.7 - 15.7 g/dL    Hematocrit 34.3 (*) 35.0 - 47.0 %    MCV 75 (*) 78 - 100 fl    MCH 24.3 (*) 26.5 - 33.0 pg    MCHC 32.4  31.5 - 36.5 g/dL    RDW 16.1 (*) 10.0 - 15.0 %    Platelet Count 315  150 - 450 10e9/L    Diff Method Automated Method      % Neutrophils 71.6  40 - 75 %    % Lymphocytes 20.9  20 - 48 %    % Monocytes 4.3  0 - 12 %    % Eosinophils 2.8  0 - 6 %    % Basophils 0.2  0 - 2 %    % Immature Granulocytes 0.2  0 - 0.4 %    Absolute Neutrophil 8.1  1.6 - 8.3 10e9/L    Absolute Lymphocytes 2.4  0.8 - 5.3 10e9/L    Absolute Monoctyes 0.5  0.0 - 1.3 10e9/L    Absolute Eosinophils 0.3  0.0 - 0.7 10e9/L    Absolute Basophils 0.0  0.0 - 0.2 10e9/L    Abs Immature Granulocytes 0.0  0 - 0.03 10e9/L   AMYLASE       Component Value Range    Amylase 103  30 - 110 U/L   COMPREHENSIVE METABOLIC PANEL       Component Value Range    Sodium 144  133 - 144 mmol/L    Potassium 3.8  3.4 - 5.3 mmol/L    Chloride 105  94 - 109 mmol/L    Carbon Dioxide 23  20 - 32 mmol/L    Anion Gap 17  6 - 17 mmol/L    Glucose 173 (*) 60 - 99 mg/dL    Urea Nitrogen 13  5 - 24 mg/dL    Creatinine 0.83  0.52 - 1.04 mg/dL    GFR Estimate 74  >60 mL/min/1.7m2    GFR Estimate If Black 90  >60 mL/min/1.7m2    Calcium 9.7  8.5 - 10.4 mg/dL    Bilirubin Total 0.4  0.2 - 1.3 mg/dL    Albumin 4.3  3.9 - 5.1 g/dL    Protein Total 7.8  6.8 - 8.8 g/dL    Alkaline Phosphatase 66  40 - 150 U/L    ALT 36  0 - 50 U/L    AST 28  0 - 45 U/L   CK TOTAL       Component Value Range    CK Total 66  30 - 225 U/L   CRP INFLAMMATION       Component Value Range    CRP Inflammation  10.4 (*) 0.0 - 8.0 mg/L   LIPASE       Component Value Range    Lipase 353 (*) 20 - 250 U/L   ERYTHROCYTE SEDIMENTATION RATE AUTO       Component Value Range    Sed Rate 26 (*) 0 - 20 mm/h   ROUTINE UA WITH MICROSCOPIC REFLEX TO CULTURE       Component Value Range    Color Urine Yellow      Appearance Urine Slightly Cloudy      Glucose Urine 30 (*) NEG mg/dL    Bilirubin Urine Negative  NEG    Ketones Urine 5 (*) NEG mg/dL    Specific Gravity Urine 1.026  1.003 - 1.035    Blood Urine Trace (*) NEG    pH Urine 5.0  5.0 - 7.0 pH    Protein Albumin Urine 10 (*) NEG mg/dL    Urobilinogen mg/dL Normal  0.0 - 2.0 mg/dL    Nitrite Urine Negative  NEG    Leukocyte Esterase Urine Negative  NEG    Source Midstream Urine      WBC Urine 1  0 - 2 /HPF    RBC Urine 4 (*) 0 - 2 /HPF    Squamous Epithelial /HPF Urine <1  0 - 1 /HPF    Mucous Urine Present (*) NEG /LPF    Hyaline Casts 3 (*) 0 - 2 /LPF    Calcium Oxalate Moderate (*) NEG /HPF   COMPLEMENT C3       Component Value Range    Complement C3 143  76 - 169 mg/dL   COMPLEMENT C4       Component Value Range    Complement C4 31  15 - 50 mg/dL   HEPATITIS C ANTIBODY       Component Value Range    Hepatitis C Antibody Negative  NEG   CARDIOLIPIN ANTIBODY IGG AND IGM       Component Value Range    Cardiolipin IgG Marline    0 - 15.0 GPL    Value: <15.0      Interpretation:  Negative    Cardiolipin IgM Marline    0 - 12.5 MPL    Value: <12.5      Interpretation:  Negative   LUPUS PANEL       Component Value Range    Lupus Result    NEG    Value: Negative      (Note)      COMMENTS:      The INR is normal.      APTT is normal.  1:2 Mix is not indicated.      DRVVT Screen is normal.      Thrombin time is normal.      NEGATIVE TEST; A LUPUS ANTICOAGULANT WAS NOT DETECTED IN THIS      SPECIMEN WITHIN THE LIMITS OF THE TESTING REPERTOIRE.      If the clinical picture is strongly suggestive of an antiphospholipid      syndrome, recommend anticardiolipin and beta-2-glycoprotein (IgG and       IgM) antibody tests.      Leela Franks M.D.  [phone]      5/2/2013            INR =  0.93 N = 0.86-1.14  (No additional charge)      TT = 15.7 N = 13.0-19.0 sec  (No additional charge)            APTT'S:    Seconds      Reagent =  Stago LA      Normal  =  38      Patient  =  35      1:2 Mix  =  N/A      Reference =  31-43             DILUTE MADELINE VIPER VENOM TEST:      DRVVT Screen Ratio = 0.76 Normal = <1.21         IMMUNOGLOBULIN G SUBCLASSES       Component Value Range    IGG 1030  695 - 1620 mg/dL    IgG1 488  300 - 856 mg/dL    IgG2 325  158 - 761 mg/dL    IgG3 47  24 - 192 mg/dL    IgG4 18  11 - 86 mg/dL   SUNNY ANTIBODY PANEL       Component Value Range    Ribonucleic Protein IgG Antibody 0      Smith Antibody IgG 1      SSA (RO) Antibody IgG 4      SSB (LA) Antibody IgG 0      Scleroderma Antibody IgG 0     BETA 2 GLYCOPROTEIN ANTIBODIES IGG IGM       Component Value Range    Beta-2-Glycopro IgG 1      Beta-2-Glycopro IgM 5     CYCLIC CIT PEPT IGG       Component Value Range    Cyclic Cit Pept IgG/IgA    <20 UNITS    Value: <20      Interpretation:  Negative   DNA DOUBLE STRANDED ANTIBODIES       Component Value Range    DNA-ds    0 - 29 IU/mL    Value: <15      Interpretation:  Negative       CT CHEST PULMONARY EMBOLISM W CONTRAST 5/20/2015 4:57 PM  HISTORY: Pain. SOB. Elevated d-dimer.   TECHNIQUE: 65 mL Isovue 370. Axial images with coronal  reconstructions.  COMPARISON: None.  FINDINGS: Calcified granuloma with surrounding scarring in the  posterolateral left upper lobe. Triangular shaped opacity at the right  lung base in the lateral right middle lobe could represent some  scarring, atelectasis or infiltrate. There is also some scarring or  atelectasis in the posteromedial right lung base. Lungs otherwise  clear.  The pulmonary arteries are well opacified. No CT evidence for acute  pulmonary embolus. No aortic dissection.  Diffuse fatty infiltration of the liver.  IMPRESSION  IMPRESSION:    1. No pulmonary embolus identified.  2. Small focus of atelectasis, infiltrate or scarring in the lateral  right middle lobe.  3. Old granulomatous disease.  4. Otherwise negative.  SILVERIO VAZQUEZ MD    Results for FAVIO MARTINEZ (MRN [id number]) as of 10/30/2015 17:00   Ref. Range 8/21/2012 09:06 5/22/2013 14:22 4/14/2014 12:06 9/11/2014 12:35 12/4/2014 16:38   TSH Latest Range: 0.40-4.00 mU/L 0.83 0.43 0.27 (L) 0.23 (L) 0.22 (L)     Component      Latest Ref Rng 10/30/2015   WBC      4.0 - 11.0 10e9/L 13.4 (H)   RBC Count      3.8 - 5.2 10e12/L 4.76   Hemoglobin      11.7 - 15.7 g/dL 12.4   Hematocrit      35.0 - 47.0 % 37.9   MCV      78 - 100 fl 80   MCH      26.5 - 33.0 pg 26.1 (L)   MCHC      31.5 - 36.5 g/dL 32.7   RDW      10.0 - 15.0 % 14.5   Platelet Count      150 - 450 10e9/L 324   Diff Method       Automated Method   % Neutrophils       67.7   % Lymphocytes       22.7   % Monocytes       6.3   % Eosinophils       2.7   % Basophils       0.4   % Immature Granulocytes       0.2   Absolute Neutrophil      1.6 - 8.3 10e9/L 9.1 (H)   Absolute Lymphocytes      0.8 - 5.3 10e9/L 3.1   Absolute Monoctyes      0.0 - 1.3 10e9/L 0.9   Absolute Eosinophils      0.0 - 0.7 10e9/L 0.4   Absolute Basophils      0.0 - 0.2 10e9/L 0.1   Abs Immature Granulocytes      0 - 0.4 10e9/L 0.0   Creatinine      0.52 - 1.04 mg/dL 1.21 (H)   GFR Estimate      >60 mL/min/1.7m2 47 (L)   GFR Estimate If Black      >60 mL/min/1.7m2 57 (L)   Iron      35 - 180 ug/dL 70   Iron Binding Cap      240 - 430 ug/dL 428   Iron Saturation Index      15 - 46 % 16   Albumin      3.4 - 5.0 g/dL 4.6   ALT      0 - 50 U/L 29   AST      0 - 45 U/L 21   CRP Inflammation      0.0 - 8.0 mg/L <2.9   Sed Rate      0 - 20 mm/h 8   Vitamin D Deficiency screening      20 - 75 ug/L 46   Ferritin      8 - 252 ng/mL 18   TSH      0.40 - 4.00 mU/L 0.49   T4 Free      0.76 - 1.46 ng/dL 1.06   Free T3      2.3 - 4.2 pg/mL 2.8     Component      Latest Ref Rng  11/17/2015   Testosterone Total      8 - 60 ng/dL 19   Sex Hormone Binding Globulin      30 - 135 nmol/L 32   Free Testosterone Calculated      0.11 - 0.58 ng/dL 0.34   Vitamin A       0.61   Retinol Palmitate       <0.02 . . .   Vitamin A Interp       Normal . . .   Thyroglobulin Antibody      <40 IU/mL <20   Thyroid Peroxidase Antibody      <35 IU/mL 31   DHEA Sulfate      35 - 430 ug/dL 101   Zinc       68     Component      Latest Ref Rng 1/27/2016   Sodium      133 - 144 mmol/L 135   Potassium      3.4 - 5.3 mmol/L 4.0   Chloride      94 - 109 mmol/L 104   Carbon Dioxide      20 - 32 mmol/L 24   Anion Gap      3 - 14 mmol/L 7   Glucose      70 - 99 mg/dL 88   Urea Nitrogen      7 - 30 mg/dL 20   Creatinine      0.52 - 1.04 mg/dL 1.13 (H)   GFR Estimate      >60 mL/min/1.7m2 51 (L)   GFR Estimate If Black      >60 mL/min/1.7m2 62   Calcium      8.5 - 10.1 mg/dL 9.4   Bilirubin Total      0.2 - 1.3 mg/dL 0.7   Albumin      3.4 - 5.0 g/dL 4.3   Protein Total      6.8 - 8.8 g/dL 7.7   Alkaline Phosphatase      40 - 150 U/L 66   ALT      0 - 50 U/L 24   AST      0 - 45 U/L 18   Cholesterol      <200 mg/dL 112   Triglycerides      <150 mg/dL 100   HDL Cholesterol      >49 mg/dL 34 (L)   LDL Cholesterol Calculated      <100 mg/dL 58   Non HDL Cholesterol      <130 mg/dL 78   N-Terminal Pro Bnp      0 - 125 pg/mL 29   Hemoglobin A1C      4.3 - 6.0 % 7.0 (H)   Amylase      30 - 110 U/L 60   Lipase      73 - 393 U/L 394 (H)   Troponin I ES      0.000 - 0.045 ug/L <0.015 . . .     Component      Latest Ref Rng 2/24/2016   Angiotensin Converting Enzyme       <5 (L) . . .   Neutrophil Cytoplasmic IgG Antibody       <1:20 . . .   Sed Rate      0 - 20 mm/h 86 (H)     Copath Report      Patient Name: FAVIO MARTINEZ   MR#: [id number]   Specimen #: [id number]   Collected: 3/15/2016   Received: 3/15/2016   Reported: 3/17/2016 13:33   Ordering Phy(s): PARVEEN ENRIQUEZ     ORIGINAL REPORT FOLLOWS ADDENDUM  ADDENDUM     TO ORIGINAL  "REPORT   Status: Signed Out   Date Ordered:3/18/2016   Date Complete:3/18/2016   Date Reported:3/18/2016 12:06   Signed Out By: Marianne Godfrey MD     INTERPRETATION:   This addendum is done to incorporate the results of fungal (GMS) stains   done on the specimen.  Specimen is negative for fungal organisms.  The   original final diagnosis remains unchanged.     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  Negative for malignancy(Please   see microscopic description)     Electronically signed out by:     Marianne Godfrey MD     CLINICAL HISTORY:   right orbital mass     GROSS:   The specimen is received labeled \"right orbital biopsy\" and consists of   red-tan nodule measuring 1.5 x 0.9 x 0.6 cm with one smooth side and   opposite rough side consistent with periosteum.  The specimen is   bisected revealing homogenous pale tan fleshy cut surface.  Touch   preparations are prepared, air dried and fixed, portion of specimen is   submitted in RPMI for possible lymphoma workup [facility],   ([facility], [place], [place] ).  The remainder is entirely submitted.   (Dictated by: Marianne Godfrey MD 3/15/2016 04:45 PM)     MICROSCOPIC:     Specimen consists of portion of lacrimal gland with acute and chronic   inflammation.  Focal area of microabscess formation associated with   small areas of necrosis are also present.  Inflammation is seen   extending to the periorbital adipose tissue forming panniculitis.   Specimen is negative for malignancy.  Samples sent for immunophenotyping   to [facility], ([employer], [place], [place] ) reveals no evidence   of monoclonality or aberrant antigen expression.  A GMS (fungal) stain   is pending and results will be reported as an addendum.     CPT Codes:   A: " 88908-CK6, 70181-GTEPT, SOH, 66869-RHMZI, 54311-HDLR     TESTING LAB LOCATION:   [address]  [address] [phone]     COLLECTION SITE:   Client: [facility]   Location: [facility] (S)            Component      Latest Ref Rng 4/29/2016   Nucleated RBCs      0 /100 0   Absolute Neutrophil      1.6 - 8.3 10e9/L 8.9 (H)   Absolute Lymphocytes      0.8 - 5.3 10e9/L 3.2   Absolute Monocytes      0.0 - 1.3 10e9/L 0.8   Absolute Eosinophils      0.0 - 0.7 10e9/L 0.2   Absolute Basophils      0.0 - 0.2 10e9/L 0.1   Abs Immature Granulocytes      0 - 0.4 10e9/L 0.1   Absolute Nucleated RBC       0.0   IGG      695 - 1620 mg/dL 836   IgG1      300 - 856 mg/dL 280 (L)   IgG2      158 - 761 mg/dL 277   IgG3      24 - 192 mg/dL 35   IgG4      11 - 86 mg/dL 16   RNP Antibody IgG      0.0 - 0.9 AI <0.2 . . .   Smith SUNNY Antibody IgG      0.0 - 0.9 AI <0.2 . . .   SSA (Ro) (SUNNY) Antibody, IgG      0.0 - 0.9 AI <0.2 . . .   SSB (La) (SUNNY) Antibody, IgG      0.0 - 0.9 AI <0.2 . . .   Scleroderma Antibody Scl-70 SUNNY IgG      0.0 - 0.9 AI <0.2 . . .   Cholesterol      <200 mg/dL 154   Triglycerides      <150 mg/dL 220 (H)   HDL Cholesterol      >49 mg/dL 42 (L)   LDL Cholesterol Calculated      <100 mg/dL 67   Non HDL Cholesterol      <130 mg/dL 111   M Tuberculosis Result      NEG Negative   M Tuberculosis Antigen Value       0.00   Albumin      3.4 - 5.0 g/dL 4.3   ALT      0 - 50 U/L 30   AST      0 - 45 U/L 10   Complement C3      76 - 169 mg/dL 157   Complement C4      15 - 50 mg/dL 32   CRP Inflammation      0.0 - 8.0 mg/L <2.9   Sed Rate      0 - 20 mm/h 2   DNA-ds      <10 IU/mL 1   Cyclic Citrullinated Peptide Antibody, IgG      <7 U/mL 1   Rheumatoid Factor      <20 IU/mL <20   Proteinase 3 Antibody IgG      0.0 - 0.9 AI <0.2 . . .   Myeloperoxidase Antibody IgG      0.0 - 0.9 AI 2.5 (H)   Vitamin D Deficiency screening      20 - 75 ug/L 24   Hemoglobin  A1C      4.3 - 6.0 % 7.9 (H)   ALLI by IFA IgG       1:40 (A) . . .     Component      Latest Ref Rng & Units 4/7/2017   WBC      4.0 - 11.0 10e9/L 11.3 (H)   RBC Count      3.8 - 5.2 10e12/L 4.77   Hemoglobin      11.7 - 15.7 g/dL 12.5   Hematocrit      35.0 - 47.0 % 38.7   MCV      78 - 100 fl 81   MCH      26.5 - 33.0 pg 26.2 (L)   MCHC      31.5 - 36.5 g/dL 32.3   RDW      10.0 - 15.0 % 13.2   Platelet Count      150 - 450 10e9/L 347   Diff Method       Automated Method   % Neutrophils      % 67.1   % Lymphocytes      % 22.9   % Monocytes      % 6.2   % Eosinophils      % 3.0   % Basophils      % 0.4   % Immature Granulocytes      % 0.4   Nucleated RBCs      0 /100 0   Absolute Neutrophil      1.6 - 8.3 10e9/L 7.5   Absolute Lymphocytes      0.8 - 5.3 10e9/L 2.6   Absolute Monocytes      0.0 - 1.3 10e9/L 0.7   Absolute Eosinophils      0.0 - 0.7 10e9/L 0.3   Absolute Basophils      0.0 - 0.2 10e9/L 0.1   Abs Immature Granulocytes      0 - 0.4 10e9/L 0.1   Absolute Nucleated RBC       0.0   IGG      695 - 1620 mg/dL 962   IgG1      300 - 856 mg/dL Test sent to [facility]. See ARMISC.   IgG2      158 - 761 mg/dL Test sent to [facility]. See [facility]ISC.   IgG3      24 - 192 mg/dL Test sent to [facility]. See [facility]ISC.   IgG4      11 - 86 mg/dL Test sent to [facility]. See ARMISC.   Result       SEE NOTE . . .   Test Name       IGG SUBCLASSES   Send Outs Misc Test Code       04869   Send Outs Misc Test Specimen       Serum   Creatinine      0.52 - 1.04 mg/dL 1.20 (H)   GFR Estimate      >60 mL/min/1.7m2 47 (L)   GFR Estimate If Black      >60 mL/min/1.7m2 57 (L)   Creatinine Urine      mg/dL    Albumin Urine mg/L      mg/L    Albumin Urine mg/g Cr      0 - 25 mg/g Cr    Myeloperoxidase Antibody IgG      0.0 - 0.9 AI 2.9 (H)   Proteinase 3 Antibody IgG      0.0 - 0.9 AI <0.2 . . .   Neutrophil Cytoplasmic IgG Antibody       1:80 (A) . . .   Angiotensin Converting Enzyme       <5 (L) . . .   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      Comparison: MRI brain 5/29/2013      Technique:   Orbits: Axial and coronal T1-weighted, and coronal T2-weighted images  obtained without intravenous contrast. Post-intravenous contrast  (using gadolinium) sagittal FLAIR, were obtained with fat-saturation,  focused on the orbits.  Brain: Axial susceptibility-weighted and FLAIR sequences were obtained  of the whole brain without intravenous contrast, and postcontrast  axial T1-weighted images were obtained through the whole brain.   Contrast: 7.5mL Gadavist     Findings:  New asymmetric enlargement of the right lacrimal gland and enhancement  of the right lacrimal gland with surrounding ill-defined crescentic T2  hyperintense signal and enhancement within the right superior  superotemporal orbit, predominantly involving the extraconal space  with slight intraconal extension. No definite associated restricted  diffusion. No discrete extraocular muscle enlargement. Normal  symmetric optic nerve signal. Cavernous sinuses and orbital apices are  normal. Globes are normal.     Whole brain images are symmetric minimal leukoaraiosis and generalized  parenchymal volume loss. Ventricles are not enlarged. No intracranial  hemorrhage, mass effect, midline shift or abnormal extra axial fluid  collection. No abnormal foci of intracranial enhancement or restricted  diffusion. Paranasal sinuses and mastoid air cells are clear.            Impression:     New abnormal enlargement of the right lacrimal gland with surrounding  ill-defined T2 hyperintense signal and enhancement centered in the  superotemporal right orbital fat, which may represent   infectious/inflammatory dacryadenitis, orbital pseudotumor, lymphoma,  carcinoma, sarcoidosis or IgG4 disease..     I have personally reviewed the examination and initial interpretation  and I agree with the findings.     PATRICIA BRANTLEY MD      Component      Latest Ref Rng & Units 6/1/2017   WBC      4.0 - 11.0 10e9/L 12.0 (H)   RBC Count      3.8 - 5.2 10e12/L 5.18   Hemoglobin      11.7 - 15.7 g/dL 13.8   Hematocrit      35.0 - 47.0 % 41.0   MCV      78 - 100 fl 79   MCH      26.5 - 33.0 pg 26.6   MCHC      31.5 - 36.5 g/dL 33.7   RDW      10.0 - 15.0 % 14.8   Platelet Count      150 - 450 10e9/L 322   Diff Method       Automated Method   % Neutrophils      % 76.7   % Lymphocytes      % 16.5   % Monocytes      % 4.6   % Eosinophils      % 1.9   % Basophils      % 0.3   Absolute Neutrophil      1.6 - 8.3 10e9/L 9.2 (H)   Absolute Lymphocytes      0.8 - 5.3 10e9/L 2.0   Absolute Monocytes      0.0 - 1.3 10e9/L 0.6   Absolute Eosinophils      0.0 - 0.7 10e9/L 0.2   Absolute Basophils      0.0 - 0.2 10e9/L 0.0   Color Urine       Yellow   Appearance Urine       Clear   Glucose Urine      NEG mg/dL Negative   Bilirubin Urine      NEG Negative   Ketones Urine      NEG mg/dL Negative   Specific Gravity Urine      1.003 - 1.035 1.010   pH Urine      5.0 - 7.0 pH 5.5   Protein Albumin Urine      NEG mg/dL Negative   Urobilinogen Urine      0.2 - 1.0 EU/dL 0.2   Nitrite Urine      NEG Negative   Blood Urine      NEG Negative   Leukocyte Esterase Urine      NEG Negative   Source       Midstream Urine   WBC Urine      0 - 2 /HPF O - 2   RBC Urine      0 - 2 /HPF O - 2   Squamous Epithelial /LPF Urine      FEW /LPF Few   Bacteria Urine      NEG /HPF Few (A)   Creatinine      0.52 - 1.04 mg/dL 1.19 (H)   GFR Estimate      >60  "mL/min/1.7m2 48 (L)   GFR Estimate If Black      >60 mL/min/1.7m2 58 (L)   Protein Random Urine      g/L <0.05   Protein Total Urine g/gr Creatinine      0 - 0.2 g/g Cr Unable to calculate due to low value   Myeloperoxidase Antibody IgG      0.0 - 0.9 AI 1.9 (H)   Proteinase 3 Antibody IgG      0.0 - 0.9 AI <0.2 . . .   ALT      0 - 50 U/L 29   AST      0 - 45 U/L 18   Albumin      3.4 - 5.0 g/dL 4.6   CRP Inflammation      0.0 - 8.0 mg/L 6.1   Sed Rate      0 - 30 mm/h 13   Creatinine Urine Random      mg/dL 54   Neutrophil Cytoplasmic IgG Antibody       <1:20 . . .   Creatinine Urine      mg/dL 54       Patient Name: FAVIO MARTINEZ   MR#: [id number]   Specimen #: [id number]   Collected: 8/4/2017   Received: 8/4/2017   Reported: 8/9/2017 16:19   Ordering Phy(s): CRISTHIAN FLORES     For improved result formatting, select 'View Enhanced Report Format'   under Linked Documents section.     SPECIMEN(S):   Eye, right lacrimal gland     FINAL DIAGNOSIS:   Eye, right, \"lacrimal gland\", biopsy   - Dense fibrosis and patchy inflammation   - No residual lacrimal gland seen     COMMENT:   Sections show tissue involved by dense fibrosis and patchy inflammation.   There is no morphologic or immunohistochemical evidence of lymphoma. By   separate report, concurrent flow cytometry studies ([id number]),   performed at the [facility], show polytypic B cells and no   aberrant immunophenotype on T cells. Immunohistochemical stains for IgG   and IgG-4 were performed, which provide no support for IgG-4-related   disease. Some of these changes may be related to prior surgery. Sjögren   disease is not excluded. There is no evidence of malignancy. Dr. Max Conway has reviewed this case and concurs.     Electronically signed out by:     Antonella Beth M.D.   INDICATION:  Right eye edema. Reported history of right orbital biopsy yielding pathology consistent with idiopathic inflammation.    TECHNIQUE:  Noncontrast CT images " were obtained through the brain and facial bones.    COMPARISON:  CT sinus 03/27/2017, MRI brain and orbits 02/15/2016.     FINDINGS:  CT facial bones:   Large soft tissue lesion centered within the lateral intraconal and extraconal right orbit has significantly increased in size compared to the CT dated 03/27/2017. The lesion is inseparable from the right superior, lateral, and inferior rectus muscles, as well as the right lacrimal gland. The lesion demonstrates extension posteriorly slightly proximal to the orbital apex, as well as extension into the medial orbit superiorly and anteriorly. Mass effect includes medial bowing of the optic nerve sheath complex and pronounced proptosis of the right globe.  No mass is identified in the right orbit.  Torus palatinus.   Minimal mucosal thickening in the ethmoid air cells. The remainder of the visualized paranasal sinuses are clear.  CT brain:  The ventricles and sulci within normal limits for patient age. No mass effect or midline shift. The gray-white differentiation is maintained.  No acute intracranial hemorrhage or pathologic extra-axial fluid collection.  The calvarium is intact. The mastoid air cells are clear.    IMPRESSION:  1. Large soft tissue lesion centered within the lateral intraconal and extraconal right orbit has significantly increased in size compared to the CT dated 03/27/2017. The lesion is inseparable from the right lacrimal gland and rectus musculature. Mass effect includes medial bowing of the optic nerve sheath complex and pronounced proptosis of the right globe. Given provided history, findings may represent a progressive inflammatory etiology (pseudotumor). Other differential considerations, such as sarcoidosis or lymphoma, could also have this appearance.  2. No acute intracranial hemorrhage or intracranial mass effect.    Please note that all CT scans at this facility use dose modulation, iterative reconstruction, and/or weight-based dosing  when appropriate to reduce radiation dose to as low as reasonably achievable.    Dictated by Charles Ward MD @ Jul 16 2018  3:54PM    Signed by Dr. Charles Ward @ Jul 16 2018  4:20PM    CT ORBITAL WO CONTRAST 7/11/2019 3:42 PM     History: orbital pseudotumor; Subjective visual disturbance; Visual  field defect; Idiopathic orbital inflammatory syndrome  ICD-10: Subjective visual disturbance; Visual field defect; Idiopathic  orbital inflammatory syndrome     Comparison: CT 3/6/2019 and 7/16/2018, MRI brain 5/9/2017                                                                   Impression:   1. 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  2. Slightly decreased right orbital proptosis since 3/6/2019.      ROS:  A comprehensive ROS was done, positives are per HPI.        HISTORY REVIEW:  Past Medical History:   Diagnosis Date     Abnormal glandular Papanicolaou smear of cervix 1992     Allergic rhinitis     Allergy, airborne subst     Arthritis      ASCVD (arteriosclerotic cardiovascular disease)      Chronic pain      Chronic pancreatitis (H)     idiopathic, Tx: PPI, antioxidants, pancreatic enzymes     Common migraine      Coronary artery disease      Costochondritis      Difficulty in walking(719.7)      Dyspnea on exertion      Ectasia, mammary duct     [facility], persistent nipple discharge     Elevated fasting glucose      Gastro-oesophageal reflux disease      Hernia      History of angina      Hyperlipidemia LDL goal < 100      Hypertension goal BP (blood pressure) < 140/90     Essential hypertension     Iron deficiency anemia      Ischemic cardiomyopathy      Menorrhagia      Migraine headaches      Mild persistent asthma      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 Matias, neg w/u, Rx'd w/prednisone     Ventral hernia, unspecified, without mention of obstruction or gangrene 2012     Past Surgical History:   Procedure Laterality Date     C ECHO HEART XTHORACIC,COMPLETE, W/O DOPPLER  04    [facility] WNL, EF 60%     C/SECTION, LOW TRANSVERSE           CARDIAC SURGERY      cardiac stent- recent in 16; 4 other stents     DILATION AND CURETTAGE N/A 2016    Procedure: DILATION AND CURETTAGE;  Surgeon: Nahed Butler MD;  Location: [facility]     HC UGI ENDOSCOPY W EUS  08    Dr. Pastrana, possible chronic pancreatitis, fatty liver     HERNIA REPAIR  2012    done at [facility] [place]     INSERT INTRAUTERINE DEVICE N/A 2016    Procedure: INSERT INTRAUTERINE DEVICE;  Surgeon: Nahed Butler MD;  Location: [facility]     INT UTERINE FIBRIOD EMBOLIZATION  10/29/2014     LAPAROSCOPIC CHOLECYSTECTOMY  08    Dr. Arnol GRUBBS TX, CERVICAL      s/p LEEP     ORBITOTOMY Right 3/15/2016    Procedure: ORBITOTOMY;  Surgeon: Myron Cyr MD;  Location: [facility]     ORBITOTOMY Right 2017    Procedure: ORBITOTOMY;  RIGHT ORBITOTOMY AND BIOPSY;  Surgeon: Charis Holbrook MD;  Location: [facility]     REPAIR PTOSIS Right 2017    Procedure: REPAIR PTOSIS;  RIGHT UPPER LID PTOSIS REPAIR;  Surgeon: Myron Cyr MD;  [facility]     UPPER  GI ENDOSCOPY  10/21/08    mild gastritis, Dr. Hidalgo     Family History   Problem Relation Age of Onset     Heart Disease Father 50        heart attack     Cerebrovascular Disease Father      Diabetes Father      Hypertension Father      Depression Father      C.A.D. Father      Hypertension Mother      Arthritis Mother      Heart Disease Mother         long qt syndrome     Thyroid Disease Mother      C.A.D. Mother      Heart Disease Brother 15        MI at 15, 16.      Diabetes Maternal Uncle      Hypertension Maternal Aunt      Hypertension Maternal Uncle      Arthritis Brother         he passed away, had severe arthritis at age 11     Arthritis Maternal Uncle      Eye Disorder Maternal Uncle         cataracts     Neurologic Disorder Sister         migraines     Neurologic Disorder Sister         migraines     Respiratory Son         asthma     Cerebrovascular Disease Maternal Uncle      C.A.D. Brother      Family History Negative Other         neg for RA, SLE     Unknown/Adopted No family hx of         unknown neurological issues in her family, mother was adopted     Skin Cancer No family hx of         No known family hx of skin cancer     Social History     Socioeconomic History     Marital status: Single     Spouse name: Not on file     Number of children: 1     Years of education: Not on file     Highest education level: Not on file   Occupational History     Employer: NONE    Social Needs     Financial resource strain: Not on file     Food insecurity:     Worry: Not on file     Inability: Not on file     Transportation needs:     Medical: Not on file     Non-medical: Not on file   Tobacco Use     Smoking status: Current Every Day Smoker     Packs/day: 0.20     Years: 1.00     Pack years: 0.20     Types: Cigarettes     Last attempt to quit: 2016     Years since quitting: 3.7     Smokeless tobacco: Never Used   Substance and Sexual Activity     Alcohol use: No     Alcohol/week: 0.0 standard drinks      Drug use: No     Sexual activity: Not Currently   Lifestyle     Physical activity:     Days per week: Not on file     Minutes per session: Not on file     Stress: Not on file   Relationships     Social connections:     Talks on phone: Not on file     Gets together: Not on file     Attends Mandaen service: Not on file     Active member of club or organization: Not on file     Attends meetings of clubs or organizations: Not on file     Relationship status: Not on file     Intimate partner violence:     Fear of current or ex partner: Not on file     Emotionally abused: Not on file     Physically abused: Not on file     Forced sexual activity: Not on file   Other Topics Concern     Parent/sibling w/ CABG, MI or angioplasty before 65F 55M? Yes   Social History Narrative    Balanced Diet - sometimes    Osteoporosis Prevention Measures - Dairy servings per day: 2 servings weekly    Regular Exercise -  Yes Describe walking 4 times a week    Dental Exam - NO    Seatbelts used - Yes    Self Breast Exam - Yes    Abuse: Current or Past (Physical, Sexual or Emotional)- No    Do you have any concerns about STD's -  No    Do you feel safe in your environment - No    Guns stored in the home - No    Sunscreen used - Yes    Lipids -  YES - Date: 053102    Glucose -  YES - Date: 012804    Eye Exam - YES - Date: one year ago    Colon Cancer Screening - No    Hemoccults - NO    Pap Test -  YES - Date: 070904, remote history of LEEP    Mammography - YES - Date: last spring, would like to discuss, needs a referral to [facility]    DEXA - NO    Immunizations reviewed and up to date - Yes, last td given in 1997 or 1998     Patient Active Problem List   Diagnosis     Seasonal affective disorder (H)     Allergic rhinitis     PCOS (polycystic ovarian syndrome)     Moderate persistent asthma     Chronic pancreatitis (H)     Hypertension goal BP (blood pressure) < 140/90     Common migraine     NSTEMI (non-ST elevated  myocardial infarction) (H)     Hyperlipidemia LDL goal <70     ASCVD (arteriosclerotic cardiovascular disease)     GERD (gastroesophageal reflux disease)     Ischemic cardiomyopathy     Hypertensive heart disease     Uterine leiomyoma     Iron deficiency anemia     Costochondritis     Vitamin D deficiency     Breast cancer screening     Spinal stenosis in cervical region     Fibromyalgia     Seronegative rheumatoid arthritis (H)     Type 2 diabetes, HbA1C goal < 8% (H)     Type 2 diabetes mellitus with other specified complication (H)     Hyperlipidemia associated with type 2 diabetes mellitus (H)     Hypertension associated with diabetes (H)     Overweight with body mass index (BMI) 25.0-29.9     Tobacco use disorder     Telogen effluvium     CAD S/P percutaneous coronary angioplasty     Status post coronary angiogram     ANCA-associated vasculitis (H)     Wegener's vasculitis (H)     Type 1 diabetes mellitus with complications (H)       Pregnancy Hx: She is . All misscarriages were in first trimester. H/o OC use in the past. 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    PMHx, FHx, SHx were reviewed, unchanged.      Outpatient Encounter Medications as of 10/29/2019   Medication Sig Dispense Refill     acetaminophen (TYLENOL) 325 MG tablet Take 1-2 tablets (325-650 mg) by mouth every 6 hours as needed for pain (headache) 250 tablet 0     albuterol (2.5 MG/3ML) 0.083% neb solution INL 1 VIAL VIA NEBULIZATION Q 4 TO 6 HOURS PRN  1     albuterol (PROAIR HFA, PROVENTIL HFA, VENTOLIN HFA) 108 (90 BASE) MCG/ACT inhaler Inhale 2 puffs into the lungs every 6 hours as needed for shortness of breath / dyspnea or wheezing 3 Inhaler 1     aspirin (ASPIRIN LOW DOSE) 81 MG EC tablet Take 1 tablet (81 mg) by mouth daily 30 tablet 11     BASAGLAR 100 UNIT/ML injection Inject 40 Units Subcutaneous daily (Patient taking differently: Inject 52 Units Subcutaneous daily ) 12 mL 2     blood glucose monitoring (NO  BRAND SPECIFIED) meter device kit Use to test blood sugar 4 X times daily or as directed. (Patient taking differently: 1 kit Use to test blood sugar 4 X times daily or as directed.) 1 kit 0     blood glucose monitoring (NO BRAND SPECIFIED) test strip 1 strip by In Vitro route 4 times daily Test as directed. Please dispense three months and three months of refills. Thank you. (Patient taking differently: 1 strip by In Vitro route 4 times daily Test as directed. Please dispense three months and three months of refills. Thank you.) 360 each 3     Blood Pressure Monitor KIT 1 each daily Monitor home blood pressure as instructed by physician.  Dispense [place] blood pressure kit. 1 kit 0     calcium carbonate (TUMS) 500 MG chewable tablet Take 3-4 chew tab by mouth daily as needed.       clopidogrel (PLAVIX) 75 MG tablet Take 1 tablet (75 mg) by mouth daily 30 tablet 11     COMPRESSION STOCKINGS 2 each daily Apply one 10-15 mmHg compression stocking to each lower extgmierty during the day and remove before bedtime. 4 each 2     cyclobenzaprine (FLEXERIL) 10 MG tablet Take 1 tablet (10 mg) by mouth 2 times daily as needed for muscle spasms 60 tablet 2     cycloSPORINE (RESTASIS) 0.05 % ophthalmic emulsion Place 1 drop into both eyes 2 times daily 60 each 11     cycloSPORINE (RESTASIS) 0.05 % ophthalmic emulsion Place 1 drop into the right eye every 12 hours       dicyclomine (BENTYL) 20 MG tablet TAKE 1 TABLET(20 MG) BY MOUTH FOUR TIMES DAILY AS NEEDED 60 tablet 3     diphenhydrAMINE (BENADRYL) 25 MG capsule TK 1 TO 2 CS PO QHS  4     EPIPEN 2-RIKY 0.3 MG/0.3ML injection INJECT 0.3 MG INTO THE MUSCLE PRF ANAPHYALAXIS  0     fexofenadine (ALLEGRA) 180 MG tablet Take 1 tablet by mouth daily as needed. 90 tablet 3     desonide (DESOWEN) 0.05 % cream Apply topically 2 times daily       ferrous gluconate (FERGON) 324 (38 Fe) MG tablet Take 1 tablet (324 mg) by mouth 2 times daily (with meals) (Patient not taking: Reported on  10/29/2019) 180 tablet 3     fluocinolone (SYNALAR) 0.01 % solution Apply topically daily as needed       fluticasone-vilanterol (BREO ELLIPTA) 100-25 MCG/INH inhaler Inhale 1 puff into the lungs daily       folic acid (FOLVITE) 1 MG tablet Take 1 tablet by mouth daily (Patient not taking: Reported on 10/29/2019) 90 tablet 3     hydroxychloroquine (PLAQUENIL) 200 MG tablet Take 2 tablets (400 mg) by mouth daily 180 tablet 1     insulin pen needle (BD MARCK U/F) 32G X 4 MM USE DAILY OR AS DIRECTED 300 each 3     ketoconazole (NIZORAL) 2 % shampoo Apply topically daily as needed       lifitegrast (XIIDRA) 5 % opthalmic solution Place 1 drop into both eyes 2 times daily (Patient not taking: Reported on 6/3/2019) 20 each 3     lisinopril-hydrochlorothiazide (PRINZIDE/ZESTORETIC) 20-25 MG tablet Take 1 tablet by mouth daily 30 tablet 11     Magnesium Oxide -Mg Supplement 250 MG TABS TK 1 T PO BID. REDUCE IF STOOLS LOOSEN  11     metFORMIN (GLUCOPHAGE-XR) 500 MG 24 hr tablet TAKE 2 TABLETS(1000 MG) BY MOUTH DAILY WITH DINNER 180 tablet 0     metoclopramide (REGLAN) 10 MG tablet Take 1 tablet (10 mg) by mouth 4 times daily (before meals and nightly) 90 tablet 0     metoprolol succinate ER (TOPROL-XL) 100 MG 24 hr tablet Take 1 tablet (100 mg) by mouth daily 30 tablet 11     metroNIDAZOLE (NORITATE) 1 % cream Apply topically daily       montelukast (SINGULAIR) 10 MG tablet Take 1 tablet (10 mg) by mouth At Bedtime 30 tablet 1     Multiple Vitamin (DAILY-GERALD) TABS Take 1 tablet by mouth daily  0     nitroGLYcerin (NITROSTAT) 0.4 MG sublingual tablet Place 1 tablet (0.4 mg) under the tongue every 5 minutes as needed for chest pain 25 tablet 1     nystatin (MYCOSTATIN) 644674 UNITS TABS tablet TK 2 TS PO BID  0     ondansetron (ZOFRAN-ODT) 8 MG ODT tab Take 1 tablet (8 mg) by mouth every 8 hours as needed for nausea 60 tablet 1     pantoprazole (PROTONIX) 40 MG EC tablet Take 1 tablet (40 mg) by mouth daily Pork free tablets.  30 tablet 1     pravastatin (PRAVACHOL) 40 MG tablet Take 1 tablet (40 mg) by mouth daily 90 tablet 2     ranitidine (ZANTAC) 150 MG tablet Take 1 tablet (150 mg) by mouth 2 times daily 180 tablet 1     spironolactone (ALDACTONE) 50 MG tablet Take 1 tablet (50 mg) by mouth daily . Take additional 0.5 tablets by mouth once daily as needed for weight gain. 90 tablet 2     sucralfate (CARAFATE) 1 GM tablet Take 1 tablet (1 g) by mouth 4 times daily 120 tablet 3     traMADol (ULTRAM) 50 MG tablet Take 1 tablet (50 mg) by mouth every 8 hours as needed for moderate pain 60 tablet 3     Triamcinolone Acetonide (NASACORT ALLERGY 24HR NA)        vitamin D (ERGOCALCIFEROL) 39836 UNIT capsule Take 1 capsule (50,000 Units) by mouth every 7 days Need a Vitamin D level in 2 months. (Patient taking differently: Take 50,000 Units by mouth every 7 days Need a Vitamin D level in 2 months.) 8 capsule 0     No facility-administered encounter medications on file as of 10/29/2019.        Allergies   Allergen Reactions     Amoxicillin-Pot Clavulanate      Augmentin      Unknown possible hives and edema     Azithromycin      Diatrizoate Other (See Comments)     Pt wants this listed because she is allergic to shellfish      Imitrex [Sumatriptan]      Severe face/neck/chest tightness and flushing side effects      Penicillins Hives     Unknown      Pork Allergy      Stomach pain, cramping, diarrhea, itching, nausea and headaches     Shellfish Allergy Hives and Swelling     Sulfa Drugs Hives and Swelling     Zithromax [Azithromycin Dihydrate] Swelling     Unknown          Ph.E:    Vitals:    10/29/19 1417   BP: 122/75   BP Location: Right arm   Patient Position: Sitting   Cuff Size: Adult Regular   Pulse: 79   Temp: 98.2  F (36.8  C)   TempSrc: Oral   SpO2: 99%   Weight: 75.7 kg (166 lb 12.8 oz)           Constitutional: WD/WN. Pleasant. In no acute distress.   Eyes: Swelling around R eye significantly improved, not clear swelling today.  EOMI. Mild fullness still appreciable on R globe.  HEENT: No oral ulcers or thrush. Normal salivary pool.  Lymph: No cervical, supraclavicular, or axillary LAD.  Chest: Clear to auscultation bilaterally, Normal work of breathing.  CV: RRR, no murmurs/ rubs or gallops. No edema, clubbing or cyanosis.   GI: Abdomen is soft and non tender.  MS: No synovitis or joint tenderness. Cool joints. No joint deformities. Full ROM of the joints. No nodules.   Skin: No rashes or lesions noted. No malar rash.  Neuro: A&O x 3. Grossly non focal, muscular power 5/5 in all ext  Psych: NL affect and mood    Assessment/ plan:    1-Seropositive non-erosive RA (arthritis, AM stiffness, high CRP, RF 26 but re-check neg 3/2015 on HCQ) Dx 5/2013, FMS, new pleuritic CP 3/20-15-5/2015 resolved on steroids. GPA. She is on  mg bid since 5/2014. She tolerates it well and it's helping some but not enough to control all her pain. Continues having flare ups of joint pain, swelling also has FMS. Joint sx are getting worse. Had high ESR/CRP in 3/2015 and new pleuritic CP between 3/2015-5/2015 which resolved after taking medrol dose pack prescribed 5/20/2015. Her pleuritic CP could be related to her RA. Then stopped tramadol as it blames it for recent episodes of CP.    In the past, MTX was recommended, she declined.    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 Etiology of mass unclear, but it's inflammatory and related to pt's underlying autoimmune disease (inflammatory arthritis, serositis). 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    Her work up showed borderline + ALLI and slightly elevated MPO. I told her prednisone is not good for her diabetes and weight gain. Recommended a trial of MTX as next step. Discussed  risks on details. I called her neuro-ophthalmologist at last visit who agreed with trial of MTX (she suspects pseudo-sarcoidosis or sarcoid like disease but no granuloma and ACE not elevated).     Unfortunately despite all my efforts to explain risks vs benefits (more than risks) of MTX as steroid sparing gent, Janine declined to try MTX. I was very concerned about her R orbital inflammatory mass as there is high chance of flare up off steroids and steroid causes her weigh gain and uncontrolled diabetes. I even offered her other steroid sparing gents like imuran. She declined that as well.    Her inflammation around R orbit has recurred now. On 4/7/2017, I spent quite amount of time discussing a trial of MTX. After discussing risks/benefits, she agreed to try it.     She is s/p Tx with MTX 10 mg po qwk 4/2017-5/2017. No benefits and more GI SEs, more hair loss and headaches since start of MTX. No benefits. I called and spoke with her neuro-ophthalmologist who recommended a second opinion from neuro-ophthalmology at the . I suspect her presentation to be ANCA vasculitis related but wanted to repeat imaging and get neuro-ophthalmology eval here. She was recommended to have repeat biopsy to r/o lymphoma.    In 5/2017, prescribed her prednisone 40-30-20-10 mg a day each for 3 days then stop (she declined higher dose and longer taper despite my concern for worsening swelling around orbit), Her R campos-orbital edema significantly improved. MTX was stopped in 5/2017 given side effects. Plan was to start imuran 50 mg po qd (NL TPMT); however we decided to hold off till she gets biopsy done.    Repeat R lacrimal gland biopsy in 8/2017 showed non specific inflammation, it ruled out lymphoma. Her R orbital swelling is improved but still present. After her biopsy I contacted her to schedule a f/u visit with me to discuss plan of care but she declined till today's visit.    She did not tolerate AZA because of GI SEs.    She  continued to have R campos-orbital swelling, fatigue and joint pain (part of it is due to FMS). 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     This is definitely an inflammatory process and is steroid responsive, she had local kenalog inj in 8/2017 at the time of biopsy which has helped. Given her diabetes and long term SE of prednisone, highly recommend steroid sparing agent to prevent progression/recurrence of R campos-orbital swelling. Since she did not tolerate MTX and AZA, recommend rituximab as next step.     In 2/2018, I spent 30 minutes discussing test results, plan of care, rituximab SEs including rare risk of PML. She declined rituximab with concern for SEs.    Unfortunately lost f/u sine 2/2018. In 9/2018, was back with her R eye being much worse, swelling around R orbit was severe and is pushing down her eye. She decided to see an ophthalmologist at [place], was seen 8/29, was told to have GPA.    Janine is frustrated with her eye condition, saying nobody told her that she has GPA or Wegener's which is a serious condition and people could die from it.    I reminded her that I discussed the Dx of ANCA-vasculitis which is just another name for Wegener's with her many times but she always declined induction Tx rituximab at last visit in 9/2018. I put her on prednisone 30 mg every day in 9/2018, now she is off, stopped 6 wk after surgery. Does not like SEs including worsening BG.    CT chest/abdomen/pelvis 4/2017 was neg for malignancy. 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 Repeat MPO was positive in 6/2017.    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 Shah and Dr. Valdez at [place]. 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 Not happy with results, on my exam, her eye swelling/pain significantly improved. She wants to transfer care to here, I  advised her to make an appointment with Dr. Saulo GREEN for f/u and referred her to Dr. Vasquez to assess if she has sinus involvement by GPA given facial pain.    After my original recommendation to receive rituximab (FDA approved for GPA) in 2/2018. She finally agreed to it, had 1 gr q2wk x 2 on 12/12/2018 and 12/26/2018. Had minor allergic reaction which was managed by extra dose of steroid and benadryl. She refuses to do prednisone taper given h/o diabetes, GIB on prednisone. I told her it would take 1 mo for rituximab to show benefit, if she continues to have active disease, recommend trial of cytoxan.     Patient received Rituxan again (6/3/19 & 6/17/19) 6 months after first round. Repeat CT scan show continued inflammatory changes in the R orbit, but decreased proptosis. We discussed cytoxan again but she said it is out of question and she declined considering it. Therefore, will continue with q6mo rituximab with hope that inflammation goes down. Patient is not on any prednisone. Headaches have improved since surgery and rituximab treatment but she reports pain/swelling around R eye, not much swelling on exam.    Today's plan:    Follow up with Dr. Vernon needs to be scheduled in 1/2020 or sooner    Lidocaine cream and ketoprofen powder for knee  pain    Labs today, if worsened labs, recommend to give rituximab 1 gram every 2 weeks x 2 in Nov 2019; otherwise we'll do it in December 2019 as  planned      Continue accupuncture (referral was placed as it helps with chronic pain).     My impression is that her arthralgias are a combination of IA, fibromyalgia and chronic pain.  Stopped HCQ at last visit, shortly after resumed taking it as arthralgia recurred. Continue HCQ. Eye exam is updated.    2-Fad pads. She was seen by endocrinology and cushing was ruled out in 4/2014. Was advised to do f/u for enlarged thyroid/thyroid nodules.    3-Hair loss. F/U with dermatology    4-Chronic pain. F/U with pain  clinic    5-Concerns of subclinical hyperthyroidism: TSH is barely minimal, chart review shows that those lowest levels are since April 2014. At last visit, discussed Endocrinology ref which pt wants to hold off in this visit.     6-Vit D deficiency. Was replaced with vit D 50, 000 units po qwk x 12 wk then 2000 units every day    7-Upper extremity swelling. Recent mammogram without suggestion of malignancy. No LAD of axillary region. Arms appear normal on physical exam, however patient reports intermittent swelling.  - Referral to lymphedema clinic was done in the past.      PLAN: Return in 3-4 months       MEDICATION CHANGES: as above        Orders Placed This Encounter   Procedures     AST     ALT     Vasculitis panel     Albumin level     CBC with platelets differential     Creatinine     CRP inflammation     UA with Microscopic reflex to Culture     Protein  random urine with Creat Ratio     Creatinine random urine     Erythrocyte sedimentation rate auto     CD19 B Cell Count     ANCA IgG by IFA with Reflex to Titer     Immunoglobulins A G and M     Vitamin D Deficiency     Vitamin B12     Ferritin     Magnesium

## 2019-10-29 NOTE — PATIENT INSTRUCTIONS
Follow up with Dr. Vernon needs to be scheduled in 1/2020 or sooner    Lidocaine cream and ketoprofen powder    Labs today, if worsened labs, recommend to give rituximab 1 gram every 2 weeks x 2 in Nov 2019; otherwise we'll do it in December 2019      Return in 3-4 months (new slots could be used)

## 2019-10-29 NOTE — LETTER
10/29/2019       RE: Janine Cornell  [address] [facility] [id number]     Dear Colleague,    Thank you for referring your patient, Janine Cornell, to the [facility] RHEUMATOLOGY at [facility] Please see a copy of my visit note below.    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 Cornell is a 51 yo AAF who was referred to our clinic for evaluation and management of her joint pain in setting of positive RF 26.    Her joint symptoms first started more than a year ago, but over last 6-8 months fluctuating some good and bad days . All her joints are involved. Over last 5 months, hands became swollen, warm and painful. Tylenol does not help. Ketoprofen did not help. Was told to avoid NSAIDs given ACS. Reports AM/PM stiffness x 2-3 hr, can't make full fist when wakes up in AM.    She feels tired all the time. Reports worsening hair loss and unchanged  facial rash. Gets red sore flaky rash over cheeks across her face which is intermittent diagnosed Rosacea and is prescribed metronidazole gel which she has not started using. Reports occasional oral ulcers. Hands feel cold with red discoloration last winter. Has occasional dysphagia to both solids and liquid. Has dry eyes, is using OTC allergy eyedrops. Has chronic abdominal pain. Has h/o pancreatitis. Sometime has anxiety. Occasionally has numbness, tingling in fingers/toes. Has back and spine issues, her whole back and neck hurts, different areas at different time. She is wondering if she has FMS. Has hard time sleeping, has never been diagnosed with BRENNA. She does not know if he snores. She was found to have slightly positive RF in 2/2013. Has microcytic anemia.     Her arthralgia gets worse with drop in temperature. Reports body ache. Activity makes her pain  worse. Her joint swelling isintermittent. Her body pain and joint pain is the same. Reports AM stiffness x 3 hr.    She has been doing acupuncture x few months and it is helping with her pain. She thinks that the combination of plaquenil and acupuncture is helpful but overall she notice 30% in her symptoms relief.     Takes tylenol and tramadol on occasion for pain.    She decided to use different formulation of metformin which helped with her abdominal pain. I referred her to GI for evaluation of elevated lipase and abdominal pain. She decided to see GI in the future.     She is on  mg qd since 5/2014, tolerates it well and it helps her some. Denies taking any gabapentin due to chest pain and headches. She has had referral her for water aerobics, but she can't begin until she's healed from recent surgery in 10/2014. She thinks HCQ is helping but not enough to control all her pain, reports 2-3 hr of AM stiffness with ongoing diffuse arthralgia/myalgia along with intermittent swelling of hands. She does see her acupuncture person and it helps with her pain, has not started water aerobics yet. Has got her flu shot.     10/2015: Reports having pleuritic CP in 3/2015, was prescribed Lidoderm patches first. It did not help. Took prednisone (?dose) by her own, pain got better but it recurred off prednisone and gradually got worse to the point that she could not stand the pain anymore, was seen in ER in 5/2015, was treated with medrol dose pack which helped. Reports pain over hips, knees, ankles and fingers. Pain gets worse with walking. Reports myalgia, swelling of the arms. Pain over neck, shoulders. Has AM stiffness x 3-4 hr. Fingers swell up and become painful. Can't remember if steroid helped with joint pain. She had headaches, severe CP when she took tramadol and gabapentin and stopped taking them, sx resolved but she can't tell which one caused SEs but thinks that probably it was gabapentin. She has good days  and tries to be more active but activity aggravates the pain. Headaches are intermittent. HCQ helps some not enough to control all the pain. No HCQ SEs. Had eye exam around July 2015. Flexeril helps but PCP wants to change flexeril to zanaflex, reporting that she is NOT comfortable with the change. Acupuncture still helps an she continued to  do that. Concerned about fat pads she has in supraclavicular area, has h/o thyroid nodules. Continues having hair loss, takes iron for iron deficiency.     2/2016: She has 2 major complaints today, increased joint pain/swelling in hands/feet and recent Dx of optic neuritis in R eye, reports having similar problem about 20 yr ago, now recurred. Has a spot in R eye vision x long term, was seen by ophthalmology few days ago and was told to have optic neuritis. Gets joint pain, muscle pain. Can't  objects, hands are swollen. Knees, hips and ankles are painful. Reports more arthralgia. AM stiffness/ pain is 3-4 hr. She wants me to talk to her ophthalmologist directly.    She had botox inj for migraines which did not help her. She is going to have angiogram next wk, had to take more nitro for CP recently.     4/29/2016: She reports being on steroid taper x 3 since last visit. Reportedly, had orbit MRI, it showed swelling/inflamamtion of R lacrimal glands. She had painful swelling of her R eye, cause her headaches. Botox inj made her headaches worse. She is being dealing with this since 1/2016, with severe headaches and pain/swelling around R eye. Had biopsy in 3/2016. She is frustrated as prednisone causes her weight gain and her BG is difficult to control on it.    5/2016: At last visit, was prescribed MTX 10 mg po qwk to take along with FA 1 mg qd. She decided not to take MTX, is afraid of side effects, believes all these medications would harm her. She also believes that botox caused her inflammation around R eye. No major flare up of per-orbital inflamamtion since last visit  but continues to have swelling around her R eye.    11/2016: Reports diffuse body ache, arthralgia, myalgia especially with weather change. No major flare up of campos-orbital inflammation but her face/around R eye swells up from time to time. Reports 2 episodes of CP over past 2 mo, nitro sometimes helps and sometimes does not help. She has asthma and costochondritis and she has hard time to distinguish the origin of pain. Sometimes knees and fingers swell up. Her stiffness is sometimes all day.    4/2017:  Reports having sinusitis since last visit. Her R eye is dry and is using eyedrops to keep it moist. Reports having pain in ears. Was treated with antibiotics by PCP, it got better then it got worse. Reports catching infections easily. Then started having pressure over eyes with pain/headaches (exact start date is unknown). Pain gradually got worse and became persistent. Had sinus CT.     4/7/2017: Having a flare up of swelling, pain around her R orbit. Has not tried MTX yet.    5/2017: On MTX 10 mg po qwk since 4/7/2017, reports increased stomach pain and nausea and new headaches since start of MTX and it's not helping. Pain/swelling around R orbit is worse.    6/2017: She finished prednisone taper prescribed at last visit, R campos-orbital swelling significantly improved. 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    2/2018: R campos-orbital swelling has improved. Repeat R lacrimal gland biopsy in 8/2017 showed non specific inflammation with no lymphoma. She is off steroid. Did not tolerate AZA due to N/V and abdominal pain.    Is back with recurrence of R campos-orbital sweling x past 2 months. Pain really got worse over past 3wk, requries     9/2018: Declined ritiximab at last visit, lost f/u since 2/2018. Went to [place] and was seen on 8/29 by Dr. 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 Reportedly he ordered labs and recommended surgery since the inflammation of the R eye is back and is pushing the eye down. Janine took some prednsione 30 mg every day few days a go for pain.    3/2019: She had lateral orbitotomy and debulking orbital mass right eye on 9/26/18 with Dr. Shah and Dr. Valdez at [place]. 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 There were no complications according to the op note.    Janine finally agreed to receive rituximab for her GPA. She had it as 1 gr q2wk x 2 on 12/12/2018 and 12/26/2018. Had minor allergic reaction which was managed by IV solu cortef and benadryl. She is off prednisone about 6wk after surgery. Had bleeding ulcer from prednsione. Has pain over sinus, ears. Still has residual pain, swelling around her R eye is concerned about it. Wants to transfer her care to ophthalmology here as it's closer to her.    Cold induced sweating congestion joint pain  Facial swelling  allegy doctor clear ear pft ok doxy sinusitis     (7/12/19):  Patient completed her 2 rounds of rituximab in June. She tolerated well. She was recently seen by Dr. Vernon in Optho and repeat CT scan of orbits shows some decrease in proptosis. She reports that her R eye still intermittently feels puffy. She also mentions intermittent swelling in her arms. Unclear of any triggers. Limbs are also heavy with muscle aches and some joint pains. Exacerbated with activity.    Today 10/29/2019:    Not feeling well with headaches, diffuse arthralgia and myalgia being worse over past 2 months. Headaches wax and wane and never go away. Gets swelling around her R eyes with numbness/tingling of R cheek.    Gets hives randomly, over trunk, extremities.    Has shortness of breath on walking fast. Gets cough from asthma, nothing new. No hemoptysis. Fingers start to hurt and itch with drop in T.    Component      Latest Ref Rng 2/28/2013  2/28/2013          12:14 PM 12:14 PM   WBC      4.0 - 11.0 10e9/L     RBC Count      3.8 - 5.2 10e12/L     Hemoglobin      11.7 - 15.7 g/dL     Hematocrit      35.0 - 47.0 %     MCV      78 - 100 fl     MCH      26.5 - 33.0 pg     MCHC      31.5 - 36.5 g/dL     RDW      10.0 - 15.0 %     Platelet Count      150 - 450 10e9/L     Specimen Description           Lyme Screen IgG and IgM           Vitamin D Deficiency screening      30 - 75 ug/L     Ferritin      10 - 300 ng/mL     Sed Rate      0 - 20 mm/h     ALLI Screen by EIA      <1.0     Rheumatoid Factor      0 - 14 IU/mL     CK Total      30 - 225 U/L  78   Uric Acid      2.5 - 7.5 mg/dL 6.7      Component      Latest Ref Rng 2/28/2013          12:14 PM   WBC      4.0 - 11.0 10e9/L    RBC Count      3.8 - 5.2 10e12/L    Hemoglobin      11.7 - 15.7 g/dL    Hematocrit      35.0 - 47.0 %    MCV      78 - 100 fl    MCH      26.5 - 33.0 pg    MCHC      31.5 - 36.5 g/dL    RDW      10.0 - 15.0 %    Platelet Count      150 - 450 10e9/L    Specimen Description       Serum   Lyme Screen IgG and IgM       Test value: <0.75....Interpretation: Negative....If you highly suspect Lyme . . .   Vitamin D Deficiency screening      30 - 75 ug/L    Ferritin      10 - 300 ng/mL    Sed Rate      0 - 20 mm/h    ALLI Screen by EIA      <1.0    Rheumatoid Factor      0 - 14 IU/mL    CK Total      30 - 225 U/L    Uric Acid      2.5 - 7.5 mg/dL      Component      Latest Ref Rn 2/28/2013 2/28/2013 2/28/2013 2/28/2013          12:14 PM 12:14 PM 12:14 PM 12:14 PM   WBC      4.0 - 11.0 10e9/L       RBC Count      3.8 - 5.2 10e12/L       Hemoglobin      11.7 - 15.7 g/dL       Hematocrit      35.0 - 47.0 %       MCV      78 - 100 fl       MCH      26.5 - 33.0 pg       MCHC      31.5 - 36.5 g/dL       RDW      10.0 - 15.0 %       Platelet Count      150 - 450 10e9/L       Specimen Description             Lyme Screen IgG and IgM             Vitamin D Deficiency screening      30 - 75 ug/L        Ferritin      10 - 300 ng/mL    10   Sed Rate      0 - 20 mm/h   23 (H)    ALLI Screen by EIA      <1.0  <1.0 . . .     Rheumatoid Factor      0 - 14 IU/mL 26 (H)      CK Total      30 - 225 U/L       Uric Acid      2.5 - 7.5 mg/dL         Component      Latest Ref Rng 2/28/2013 2/28/2013          12:14 PM 12:14 PM   WBC      4.0 - 11.0 10e9/L 14.1 (H)    RBC Count      3.8 - 5.2 10e12/L 4.55    Hemoglobin      11.7 - 15.7 g/dL 10.7 (L)    Hematocrit      35.0 - 47.0 % 33.3 (L)    MCV      78 - 100 fl 73 (L)    MCH      26.5 - 33.0 pg 23.5 (L)    MCHC      31.5 - 36.5 g/dL 32.1    RDW      10.0 - 15.0 % 18.1 (H)    Platelet Count      150 - 450 10e9/L 407    Specimen Description           Lyme Screen IgG and IgM           Vitamin D Deficiency screening      30 - 75 ug/L  32   Ferritin      10 - 300 ng/mL     Sed Rate      0 - 20 mm/h     ALLI Screen by EIA      <1.0     Rheumatoid Factor      0 - 14 IU/mL     CK Total      30 - 225 U/L     Uric Acid      2.5 - 7.5 mg/dL          MRI CERVICAL SPINE WITHOUT CONTRAST 4/3/2013 12:47 PM    HISTORY: Cervicalgia. Degeneration of cervical intervertebral disc.  MRI cervical spine. Evaluate bilateral supraclavicular lymph nodes.  Clinical enlargement.    TECHNIQUE: Multiplanar multisequence MRI of the cervical spine  without gadolinium contrast.    COMPARISON: None.    FINDINGS: The patient has a developmentally small central canal.  Images of the cervical cord reveals small areas of T2 hyperintensity  within the cervical cord. There is a small area of high signal  intensity at the C1 level. There is also a small area of high signal  intensity at the C6-C7 level. The area of high signal at C6-C7 is  located at an area of central spinal stenosis. This may be due to  myelomalacia. The area of high signal at C1-C2 is indeterminate.    C2-C3: Normal disc, facet joints, spinal canal and neural foramina.    C3-C4: Normal disc, facet joints, spinal canal and neural  foramina.    C4-C5: Normal disc, facet joints, spinal canal and neural foramina.    C5-C6: There is degeneration of the disc. There is a mild annular  disc bulge. The central canal is mild-moderately narrowed. The  residual AP diameter of the central spinal canal measures  approximately 9 mm.    C6-C7: There is degeneration of the disc. There is loss of disc space  height. There is a diffuse annular disc bulge. There are associated  posterior osteophytes. There are severe central spinal stenosis at  this level. The residual AP diameter of the central spinal canal is  only about 6 mm. There is significant flattening of the cord. There  is high signal in the cord at this level consistent with  myelomalacia. There is moderate to severe right foraminal stenosis  due to and uncinate spur.    C7-T1: Normal disc, facet joints, spinal canal and neural foramina.            Result Impression       IMPRESSION:  1. Severe central spinal stenosis at C6-C7 due to a developmentally  small canal and due to a bulging disc and associated posterior  osteophyte. There is flattening of the cord at this level and high  signal in the cord at this level consistent with myelomalacia.  2. There is a small, indeterminate 2-3 mm area of high signal  intensity in the cord at the C1 level. This could be due to gliosis  secondary to a previous infectious or inflammatory process.  Demyelinating disease could also have a similar appearance. Clinical  correlation suggested.    GAIL MORE MD     MRI LEFT UPPER EXTREMITY NON-JOINT WITHOUT CONTRAST, MRI RIGHT UPPER  EXTREMITY NON-JOINT WITHOUT CONTRAST April 3, 2013 1:32 PM    HISTORY: Cervicalgia. Degeneration of cervical intervertebral disc.    TECHNIQUE: Multiplanar, multisequence imaging of the brachial plexus  was performed without gadolinium contrast enhancement.    COMPARISON: None.    FINDINGS: No mass lesions are seen. No inflammatory process is  identified. The roots, trunks, branches, and  divisions of both the  right and left brachial plexus appear normal. No adenopathy is seen.  The anterior scalene muscles and the middle scalene muscles appear  normal. Nodules are seen within the thyroid gland. The largest nodule  is seen in the left lobe of the thyroid. This measures about 1.8 cm  in diameter.            Result Impression       IMPRESSION:  1. Both the right and left brachial plexus regions appear normal.  2. Thyroid nodules. The largest nodule is in the left lobe of the  thyroid. It measures about 1.8 cm in diameter.       XR WRIST BILATERAL G/E 3 VIEWS    Narrative:        EXAMINATION:  1. Each hand 3 views  2. Each wrist 3 views     DATE: 5/1/2013     HISTORY: Arthropathy with concern for rheumatoid.     FINDINGS: 3 views of each hand and 3 views of each wrist are obtained  without prior for comparison. Alignment is normal. There is no  fracture or acute bone abnormality. No distinct erosions are seen.  Some spurring of the distal radial ulnar joint is noted on the right.       Impression:     IMPRESSION:  1. No evidence of an inflammatory arthropathy involving either hand  or wrist.     VIELKA GUEVARA MD         XR FOOT BILATERAL G/E 3 VIEWS    Narrative:        EXAMINATION:  1. Each foot 3 views  2. Each ankle 3 views  3. Sacroiliac joint series     DATE: 5/1/2013     HISTORY: Arthropathy; concern for rheumatoid.     FINDINGS: No prior for comparison.     A frontal and bilateral oblique evaluation of the sacroiliac joints  shows no malalignment. Some patchy sclerosis is identified along the  central aspect of both sacroiliac joints, which is favored to be  degenerative. No distinct erosions are seen. The visualized portion  of the hip joint spaces appear maintained, with no erosive changes  identified. Mild endplate spurring is noted in the lower lumbar  spine.     3 views of each foot and 3 views of each ankle show no evidence of  acute fracture or dislocation. The metatarsal  phalangeal,  tarsometatarsal and intertarsal joint spaces appear maintained. No  erosions are identified. There is no sign of acroosteolysis. The  ankle mortise and talar dome are intact bilaterally. Minimal spurring  is noted along the tip of the medial malleolus bilaterally.       Impression:     IMPRESSION:  1. Patchy sclerosis identified along the central aspect of both  sacroiliac joints, which is favored to be degenerative. No distinct  erosions are seen.  2. No evidence of an inflammatory arthropathy in either foot or ankle.     VIELKA GUEVARA MD     5/2013  CBC WITH PLATELETS DIFFERENTIAL       Component Value Range    WBC 11.3 (*) 4.0 - 11.0 10e9/L    RBC Count 4.56  3.8 - 5.2 10e12/L    Hemoglobin 11.1 (*) 11.7 - 15.7 g/dL    Hematocrit 34.3 (*) 35.0 - 47.0 %    MCV 75 (*) 78 - 100 fl    MCH 24.3 (*) 26.5 - 33.0 pg    MCHC 32.4  31.5 - 36.5 g/dL    RDW 16.1 (*) 10.0 - 15.0 %    Platelet Count 315  150 - 450 10e9/L    Diff Method Automated Method      % Neutrophils 71.6  40 - 75 %    % Lymphocytes 20.9  20 - 48 %    % Monocytes 4.3  0 - 12 %    % Eosinophils 2.8  0 - 6 %    % Basophils 0.2  0 - 2 %    % Immature Granulocytes 0.2  0 - 0.4 %    Absolute Neutrophil 8.1  1.6 - 8.3 10e9/L    Absolute Lymphocytes 2.4  0.8 - 5.3 10e9/L    Absolute Monoctyes 0.5  0.0 - 1.3 10e9/L    Absolute Eosinophils 0.3  0.0 - 0.7 10e9/L    Absolute Basophils 0.0  0.0 - 0.2 10e9/L    Abs Immature Granulocytes 0.0  0 - 0.03 10e9/L   AMYLASE       Component Value Range    Amylase 103  30 - 110 U/L   COMPREHENSIVE METABOLIC PANEL       Component Value Range    Sodium 144  133 - 144 mmol/L    Potassium 3.8  3.4 - 5.3 mmol/L    Chloride 105  94 - 109 mmol/L    Carbon Dioxide 23  20 - 32 mmol/L    Anion Gap 17  6 - 17 mmol/L    Glucose 173 (*) 60 - 99 mg/dL    Urea Nitrogen 13  5 - 24 mg/dL    Creatinine 0.83  0.52 - 1.04 mg/dL    GFR Estimate 74  >60 mL/min/1.7m2    GFR Estimate If Black 90  >60 mL/min/1.7m2    Calcium 9.7  8.5 - 10.4  mg/dL    Bilirubin Total 0.4  0.2 - 1.3 mg/dL    Albumin 4.3  3.9 - 5.1 g/dL    Protein Total 7.8  6.8 - 8.8 g/dL    Alkaline Phosphatase 66  40 - 150 U/L    ALT 36  0 - 50 U/L    AST 28  0 - 45 U/L   CK TOTAL       Component Value Range    CK Total 66  30 - 225 U/L   CRP INFLAMMATION       Component Value Range    CRP Inflammation 10.4 (*) 0.0 - 8.0 mg/L   LIPASE       Component Value Range    Lipase 353 (*) 20 - 250 U/L   ERYTHROCYTE SEDIMENTATION RATE AUTO       Component Value Range    Sed Rate 26 (*) 0 - 20 mm/h   ROUTINE UA WITH MICROSCOPIC REFLEX TO CULTURE       Component Value Range    Color Urine Yellow      Appearance Urine Slightly Cloudy      Glucose Urine 30 (*) NEG mg/dL    Bilirubin Urine Negative  NEG    Ketones Urine 5 (*) NEG mg/dL    Specific Gravity Urine 1.026  1.003 - 1.035    Blood Urine Trace (*) NEG    pH Urine 5.0  5.0 - 7.0 pH    Protein Albumin Urine 10 (*) NEG mg/dL    Urobilinogen mg/dL Normal  0.0 - 2.0 mg/dL    Nitrite Urine Negative  NEG    Leukocyte Esterase Urine Negative  NEG    Source Midstream Urine      WBC Urine 1  0 - 2 /HPF    RBC Urine 4 (*) 0 - 2 /HPF    Squamous Epithelial /HPF Urine <1  0 - 1 /HPF    Mucous Urine Present (*) NEG /LPF    Hyaline Casts 3 (*) 0 - 2 /LPF    Calcium Oxalate Moderate (*) NEG /HPF   COMPLEMENT C3       Component Value Range    Complement C3 143  76 - 169 mg/dL   COMPLEMENT C4       Component Value Range    Complement C4 31  15 - 50 mg/dL   HEPATITIS C ANTIBODY       Component Value Range    Hepatitis C Antibody Negative  NEG   CARDIOLIPIN ANTIBODY IGG AND IGM       Component Value Range    Cardiolipin IgG Marline    0 - 15.0 GPL    Value: <15.0      Interpretation:  Negative    Cardiolipin IgM Marline    0 - 12.5 MPL    Value: <12.5      Interpretation:  Negative   LUPUS PANEL       Component Value Range    Lupus Result    NEG    Value: Negative      (Note)      COMMENTS:      The INR is normal.      APTT is normal.  1:2 Mix is not indicated.       DRVVT Screen is normal.      Thrombin time is normal.      NEGATIVE TEST; A LUPUS ANTICOAGULANT WAS NOT DETECTED IN THIS      SPECIMEN WITHIN THE LIMITS OF THE TESTING REPERTOIRE.      If the clinical picture is strongly suggestive of an antiphospholipid      syndrome, recommend anticardiolipin and beta-2-glycoprotein (IgG and      IgM) antibody tests.      Leela Franks M.D.  [phone]      5/2/2013            INR =  0.93 N = 0.86-1.14  (No additional charge)      TT = 15.7 N = 13.0-19.0 sec  (No additional charge)            APTT'S:    Seconds      Reagent =  Stago LA      Normal  =  38      Patient  =  35      1:2 Mix  =  N/A      Reference =  31-43             DILUTE MADELINE VIPER VENOM TEST:      DRVVT Screen Ratio = 0.76 Normal = <1.21         IMMUNOGLOBULIN G SUBCLASSES       Component Value Range    IGG 1030  695 - 1620 mg/dL    IgG1 488  300 - 856 mg/dL    IgG2 325  158 - 761 mg/dL    IgG3 47  24 - 192 mg/dL    IgG4 18  11 - 86 mg/dL   SUNNY ANTIBODY PANEL       Component Value Range    Ribonucleic Protein IgG Antibody 0      Smith Antibody IgG 1      SSA (RO) Antibody IgG 4      SSB (LA) Antibody IgG 0      Scleroderma Antibody IgG 0     BETA 2 GLYCOPROTEIN ANTIBODIES IGG IGM       Component Value Range    Beta-2-Glycopro IgG 1      Beta-2-Glycopro IgM 5     CYCLIC CIT PEPT IGG       Component Value Range    Cyclic Cit Pept IgG/IgA    <20 UNITS    Value: <20      Interpretation:  Negative   DNA DOUBLE STRANDED ANTIBODIES       Component Value Range    DNA-ds    0 - 29 IU/mL    Value: <15      Interpretation:  Negative       CT CHEST PULMONARY EMBOLISM W CONTRAST 5/20/2015 4:57 PM  HISTORY: Pain. SOB. Elevated d-dimer.   TECHNIQUE: 65 mL Isovue 370. Axial images with coronal  reconstructions.  COMPARISON: None.  FINDINGS: Calcified granuloma with surrounding scarring in the  posterolateral left upper lobe. Triangular shaped opacity at the right  lung base in the lateral right middle lobe could  represent some  scarring, atelectasis or infiltrate. There is also some scarring or  atelectasis in the posteromedial right lung base. Lungs otherwise  clear.  The pulmonary arteries are well opacified. No CT evidence for acute  pulmonary embolus. No aortic dissection.  Diffuse fatty infiltration of the liver.  IMPRESSION  IMPRESSION:   1. No pulmonary embolus identified.  2. Small focus of atelectasis, infiltrate or scarring in the lateral  right middle lobe.  3. Old granulomatous disease.  4. Otherwise negative.  SILVERIO VAZQUEZ MD    Results for JUAN FAVIO CORBETT (MRN [id number]) as of 10/30/2015 17:00   Ref. Range 8/21/2012 09:06 5/22/2013 14:22 4/14/2014 12:06 9/11/2014 12:35 12/4/2014 16:38   TSH Latest Range: 0.40-4.00 mU/L 0.83 0.43 0.27 (L) 0.23 (L) 0.22 (L)     Component      Latest Ref Rng 10/30/2015   WBC      4.0 - 11.0 10e9/L 13.4 (H)   RBC Count      3.8 - 5.2 10e12/L 4.76   Hemoglobin      11.7 - 15.7 g/dL 12.4   Hematocrit      35.0 - 47.0 % 37.9   MCV      78 - 100 fl 80   MCH      26.5 - 33.0 pg 26.1 (L)   MCHC      31.5 - 36.5 g/dL 32.7   RDW      10.0 - 15.0 % 14.5   Platelet Count      150 - 450 10e9/L 324   Diff Method       Automated Method   % Neutrophils       67.7   % Lymphocytes       22.7   % Monocytes       6.3   % Eosinophils       2.7   % Basophils       0.4   % Immature Granulocytes       0.2   Absolute Neutrophil      1.6 - 8.3 10e9/L 9.1 (H)   Absolute Lymphocytes      0.8 - 5.3 10e9/L 3.1   Absolute Monoctyes      0.0 - 1.3 10e9/L 0.9   Absolute Eosinophils      0.0 - 0.7 10e9/L 0.4   Absolute Basophils      0.0 - 0.2 10e9/L 0.1   Abs Immature Granulocytes      0 - 0.4 10e9/L 0.0   Creatinine      0.52 - 1.04 mg/dL 1.21 (H)   GFR Estimate      >60 mL/min/1.7m2 47 (L)   GFR Estimate If Black      >60 mL/min/1.7m2 57 (L)   Iron      35 - 180 ug/dL 70   Iron Binding Cap      240 - 430 ug/dL 428   Iron Saturation Index      15 - 46 % 16   Albumin      3.4 - 5.0 g/dL 4.6   ALT      0 - 50  U/L 29   AST      0 - 45 U/L 21   CRP Inflammation      0.0 - 8.0 mg/L <2.9   Sed Rate      0 - 20 mm/h 8   Vitamin D Deficiency screening      20 - 75 ug/L 46   Ferritin      8 - 252 ng/mL 18   TSH      0.40 - 4.00 mU/L 0.49   T4 Free      0.76 - 1.46 ng/dL 1.06   Free T3      2.3 - 4.2 pg/mL 2.8     Component      Latest Ref Rng 11/17/2015   Testosterone Total      8 - 60 ng/dL 19   Sex Hormone Binding Globulin      30 - 135 nmol/L 32   Free Testosterone Calculated      0.11 - 0.58 ng/dL 0.34   Vitamin A       0.61   Retinol Palmitate       <0.02 . . .   Vitamin A Interp       Normal . . .   Thyroglobulin Antibody      <40 IU/mL <20   Thyroid Peroxidase Antibody      <35 IU/mL 31   DHEA Sulfate      35 - 430 ug/dL 101   Zinc       68     Component      Latest Ref Rng 1/27/2016   Sodium      133 - 144 mmol/L 135   Potassium      3.4 - 5.3 mmol/L 4.0   Chloride      94 - 109 mmol/L 104   Carbon Dioxide      20 - 32 mmol/L 24   Anion Gap      3 - 14 mmol/L 7   Glucose      70 - 99 mg/dL 88   Urea Nitrogen      7 - 30 mg/dL 20   Creatinine      0.52 - 1.04 mg/dL 1.13 (H)   GFR Estimate      >60 mL/min/1.7m2 51 (L)   GFR Estimate If Black      >60 mL/min/1.7m2 62   Calcium      8.5 - 10.1 mg/dL 9.4   Bilirubin Total      0.2 - 1.3 mg/dL 0.7   Albumin      3.4 - 5.0 g/dL 4.3   Protein Total      6.8 - 8.8 g/dL 7.7   Alkaline Phosphatase      40 - 150 U/L 66   ALT      0 - 50 U/L 24   AST      0 - 45 U/L 18   Cholesterol      <200 mg/dL 112   Triglycerides      <150 mg/dL 100   HDL Cholesterol      >49 mg/dL 34 (L)   LDL Cholesterol Calculated      <100 mg/dL 58   Non HDL Cholesterol      <130 mg/dL 78   N-Terminal Pro Bnp      0 - 125 pg/mL 29   Hemoglobin A1C      4.3 - 6.0 % 7.0 (H)   Amylase      30 - 110 U/L 60   Lipase      73 - 393 U/L 394 (H)   Troponin I ES      0.000 - 0.045 ug/L <0.015 . . .     Component      Latest Ref Rng 2/24/2016   Angiotensin Converting Enzyme       <5 (L) . . .   Neutrophil Cytoplasmic  "IgG Antibody       <1:20 . . .   Sed Rate      0 - 20 mm/h 86 (H)     Copath Report      Patient Name: FAVIO MARTINEZ   MR#: [id number]   Specimen #: [id number]   Collected: 3/15/2016   Received: 3/15/2016   Reported: 3/17/2016 13:33   Ordering Phy(s): PARVEEN ENRIQUEZ     ORIGINAL REPORT FOLLOWS ADDENDUM  ADDENDUM     TO ORIGINAL REPORT   Status: Signed Out   Date Ordered:3/18/2016   Date Complete:3/18/2016   Date Reported:3/18/2016 12:06   Signed Out By: Marianne Godfrey MD     INTERPRETATION:   This addendum is done to incorporate the results of fungal (GMS) stains   done on the specimen.  Specimen is negative for fungal organisms.  The   original final diagnosis remains unchanged.     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  Negative for malignancy(Please   see microscopic description)     Electronically signed out by:     Marianne Godfrey MD     CLINICAL HISTORY:   right orbital mass     GROSS:   The specimen is received labeled \"right orbital biopsy\" and consists of   red-tan nodule measuring 1.5 x 0.9 x 0.6 cm with one smooth side and   opposite rough side consistent with periosteum.  The specimen is   bisected revealing homogenous pale tan fleshy cut surface.  Touch   preparations are prepared, air dried and fixed, portion of specimen is   submitted in RPMI for possible lymphoma workup [facility],   ([facility], [place], [place] ).  The remainder is entirely submitted.   (Dictated by: Marianne Godfrey MD 3/15/2016 04:45 PM)     MICROSCOPIC:     Specimen consists of portion of lacrimal gland with acute and chronic   inflammation.  Focal area of microabscess formation associated with   small areas of necrosis are also present.  Inflammation is seen   extending to the periorbital " adipose tissue forming panniculitis.   Specimen is negative for malignancy.  Samples sent for immunophenotyping   to [facility], ([facility], [place], [place] ) reveals no evidence   of monoclonality or aberrant antigen expression.  A GMS (fungal) stain   is pending and results will be reported as an addendum.     CPT Codes:   A: 25035-MW6, 19549-RFKAW, SOH, 39197-XWYTF, 76248-WJAF     TESTING LAB LOCATION:   [address]  [address] [phone]     COLLECTION SITE:   Client: [facility]   Location: [facility] (S)            Component      Latest Ref Rng 4/29/2016   Nucleated RBCs      0 /100 0   Absolute Neutrophil      1.6 - 8.3 10e9/L 8.9 (H)   Absolute Lymphocytes      0.8 - 5.3 10e9/L 3.2   Absolute Monocytes      0.0 - 1.3 10e9/L 0.8   Absolute Eosinophils      0.0 - 0.7 10e9/L 0.2   Absolute Basophils      0.0 - 0.2 10e9/L 0.1   Abs Immature Granulocytes      0 - 0.4 10e9/L 0.1   Absolute Nucleated RBC       0.0   IGG      695 - 1620 mg/dL 836   IgG1      300 - 856 mg/dL 280 (L)   IgG2      158 - 761 mg/dL 277   IgG3      24 - 192 mg/dL 35   IgG4      11 - 86 mg/dL 16   RNP Antibody IgG      0.0 - 0.9 AI <0.2 . . .   Smith SUNNY Antibody IgG      0.0 - 0.9 AI <0.2 . . .   SSA (Ro) (SUNNY) Antibody, IgG      0.0 - 0.9 AI <0.2 . . .   SSB (La) (SUNNY) Antibody, IgG      0.0 - 0.9 AI <0.2 . . .   Scleroderma Antibody Scl-70 SUNNY IgG      0.0 - 0.9 AI <0.2 . . .   Cholesterol      <200 mg/dL 154   Triglycerides      <150 mg/dL 220 (H)   HDL Cholesterol      >49 mg/dL 42 (L)   LDL Cholesterol Calculated      <100 mg/dL 67   Non HDL Cholesterol      <130 mg/dL 111   M Tuberculosis Result      NEG Negative   M Tuberculosis Antigen Value       0.00   Albumin      3.4 - 5.0 g/dL 4.3   ALT      0 - 50 U/L 30   AST      0 - 45 U/L 10   Complement C3      76 - 169 mg/dL 157   Complement C4      15 - 50 mg/dL 32   CRP Inflammation      0.0 - 8.0 mg/L <2.9    Sed Rate      0 - 20 mm/h 2   DNA-ds      <10 IU/mL 1   Cyclic Citrullinated Peptide Antibody, IgG      <7 U/mL 1   Rheumatoid Factor      <20 IU/mL <20   Proteinase 3 Antibody IgG      0.0 - 0.9 AI <0.2 . . .   Myeloperoxidase Antibody IgG      0.0 - 0.9 AI 2.5 (H)   Vitamin D Deficiency screening      20 - 75 ug/L 24   Hemoglobin A1C      4.3 - 6.0 % 7.9 (H)   ALLI by IFA IgG       1:40 (A) . . .     Component      Latest Ref Rng & Units 4/7/2017   WBC      4.0 - 11.0 10e9/L 11.3 (H)   RBC Count      3.8 - 5.2 10e12/L 4.77   Hemoglobin      11.7 - 15.7 g/dL 12.5   Hematocrit      35.0 - 47.0 % 38.7   MCV      78 - 100 fl 81   MCH      26.5 - 33.0 pg 26.2 (L)   MCHC      31.5 - 36.5 g/dL 32.3   RDW      10.0 - 15.0 % 13.2   Platelet Count      150 - 450 10e9/L 347   Diff Method       Automated Method   % Neutrophils      % 67.1   % Lymphocytes      % 22.9   % Monocytes      % 6.2   % Eosinophils      % 3.0   % Basophils      % 0.4   % Immature Granulocytes      % 0.4   Nucleated RBCs      0 /100 0   Absolute Neutrophil      1.6 - 8.3 10e9/L 7.5   Absolute Lymphocytes      0.8 - 5.3 10e9/L 2.6   Absolute Monocytes      0.0 - 1.3 10e9/L 0.7   Absolute Eosinophils      0.0 - 0.7 10e9/L 0.3   Absolute Basophils      0.0 - 0.2 10e9/L 0.1   Abs Immature Granulocytes      0 - 0.4 10e9/L 0.1   Absolute Nucleated RBC       0.0   IGG      695 - 1620 mg/dL 962   IgG1      300 - 856 mg/dL Test sent to [facility]. See [facility].   IgG2      158 - 761 mg/dL Test sent to [facility]. See [facility].   IgG3      24 - 192 mg/dL Test sent to [facility]. See [facility].   IgG4      11 - 86 mg/dL Test sent to [facility]. See [facility].   Result       SEE NOTE . . .   Test Name       IGG SUBCLASSES   Send Outs Misc Test Code       16275   Send Outs Misc Test Specimen       Serum   Creatinine      0.52 - 1.04 mg/dL 1.20 (H)   GFR Estimate      >60 mL/min/1.7m2 47 (L)   GFR Estimate If Black      >60 mL/min/1.7m2 57 (L)   Creatinine Urine      mg/dL    Albumin Urine mg/L       mg/L    Albumin Urine mg/g Cr      0 - 25 mg/g Cr    Myeloperoxidase Antibody IgG      0.0 - 0.9 AI 2.9 (H)   Proteinase 3 Antibody IgG      0.0 - 0.9 AI <0.2 . . .   Neutrophil Cytoplasmic IgG Antibody       1:80 (A) . . .   Angiotensin Converting Enzyme       <5 (L) . . .   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      Comparison: MRI brain 5/29/2013      Technique:   Orbits: Axial and coronal T1-weighted, and coronal T2-weighted images  obtained without intravenous contrast. Post-intravenous contrast  (using gadolinium) sagittal FLAIR, were obtained with fat-saturation,  focused on the orbits.  Brain: Axial susceptibility-weighted and FLAIR sequences were obtained  of the whole brain without intravenous contrast, and postcontrast  axial T1-weighted images were obtained through the whole brain.   Contrast: 7.5mL Gadavist     Findings:  New asymmetric enlargement of the right lacrimal gland and enhancement  of the right lacrimal gland with surrounding ill-defined crescentic T2  hyperintense signal and enhancement within the right superior  superotemporal orbit, predominantly involving the extraconal space  with slight intraconal extension. No definite associated restricted  diffusion. No discrete extraocular muscle enlargement. Normal  symmetric optic nerve signal. Cavernous sinuses and orbital apices are  normal. Globes are normal.     Whole brain images are symmetric  minimal leukoaraiosis and generalized  parenchymal volume loss. Ventricles are not enlarged. No intracranial  hemorrhage, mass effect, midline shift or abnormal extra axial fluid  collection. No abnormal foci of intracranial enhancement or restricted  diffusion. Paranasal sinuses and mastoid air cells are clear.            Impression:    New abnormal enlargement of the right lacrimal gland with surrounding  ill-defined T2 hyperintense signal and enhancement centered in the  superotemporal right orbital fat, which may represent   infectious/inflammatory dacryadenitis, orbital pseudotumor, lymphoma,  carcinoma, sarcoidosis or IgG4 disease..     I have personally reviewed the examination and initial interpretation  and I agree with the findings.     PATRICIA BRANTLEY MD      Component      Latest Ref Rng & Units 6/1/2017   WBC      4.0 - 11.0 10e9/L 12.0 (H)   RBC Count      3.8 - 5.2 10e12/L 5.18   Hemoglobin      11.7 - 15.7 g/dL 13.8   Hematocrit      35.0 - 47.0 % 41.0   MCV      78 - 100 fl 79   MCH      26.5 - 33.0 pg 26.6   MCHC      31.5 - 36.5 g/dL 33.7   RDW      10.0 - 15.0 % 14.8   Platelet Count      150 - 450 10e9/L 322   Diff Method       Automated Method   % Neutrophils      % 76.7   % Lymphocytes      % 16.5   % Monocytes      % 4.6   % Eosinophils      % 1.9   % Basophils      % 0.3   Absolute Neutrophil      1.6 - 8.3 10e9/L 9.2 (H)   Absolute Lymphocytes      0.8 - 5.3 10e9/L 2.0   Absolute Monocytes      0.0 - 1.3 10e9/L 0.6   Absolute Eosinophils      0.0 - 0.7 10e9/L 0.2   Absolute Basophils      0.0 - 0.2 10e9/L 0.0   Color Urine       Yellow   Appearance Urine       Clear   Glucose Urine      NEG mg/dL Negative   Bilirubin Urine      NEG Negative   Ketones Urine      NEG mg/dL Negative   Specific Gravity Urine      1.003 - 1.035 1.010   pH Urine      5.0 - 7.0 pH 5.5   Protein Albumin Urine      NEG mg/dL Negative   Urobilinogen Urine      0.2 - 1.0 EU/dL 0.2   Nitrite Urine      NEG Negative  "  Blood Urine      NEG Negative   Leukocyte Esterase Urine      NEG Negative   Source       Midstream Urine   WBC Urine      0 - 2 /HPF O - 2   RBC Urine      0 - 2 /HPF O - 2   Squamous Epithelial /LPF Urine      FEW /LPF Few   Bacteria Urine      NEG /HPF Few (A)   Creatinine      0.52 - 1.04 mg/dL 1.19 (H)   GFR Estimate      >60 mL/min/1.7m2 48 (L)   GFR Estimate If Black      >60 mL/min/1.7m2 58 (L)   Protein Random Urine      g/L <0.05   Protein Total Urine g/gr Creatinine      0 - 0.2 g/g Cr Unable to calculate due to low value   Myeloperoxidase Antibody IgG      0.0 - 0.9 AI 1.9 (H)   Proteinase 3 Antibody IgG      0.0 - 0.9 AI <0.2 . . .   ALT      0 - 50 U/L 29   AST      0 - 45 U/L 18   Albumin      3.4 - 5.0 g/dL 4.6   CRP Inflammation      0.0 - 8.0 mg/L 6.1   Sed Rate      0 - 30 mm/h 13   Creatinine Urine Random      mg/dL 54   Neutrophil Cytoplasmic IgG Antibody       <1:20 . . .   Creatinine Urine      mg/dL 54       Patient Name: FAVIO MARTINEZ   MR#: [id number]   Specimen #: [id number]   Collected: 8/4/2017   Received: 8/4/2017   Reported: 8/9/2017 16:19   Ordering Phy(s): CRISTHIAN FLORES     For improved result formatting, select 'View Enhanced Report Format'   under Linked Documents section.     SPECIMEN(S):   Eye, right lacrimal gland     FINAL DIAGNOSIS:   Eye, right, \"lacrimal gland\", biopsy   - Dense fibrosis and patchy inflammation   - No residual lacrimal gland seen     COMMENT:   Sections show tissue involved by dense fibrosis and patchy inflammation.   There is no morphologic or immunohistochemical evidence of lymphoma. By   separate report, concurrent flow cytometry studies ([id number]),   performed at the [facility], show polytypic B cells and no   aberrant immunophenotype on T cells. Immunohistochemical stains for IgG   and IgG-4 were performed, which provide no support for IgG-4-related   disease. Some of these changes may be related to prior surgery. Sjögren   disease is " not excluded. There is no evidence of malignancy. Dr. Max Conway has reviewed this case and concurs.     Electronically signed out by:     Antonella Beth M.D.   INDICATION:  Right eye edema. Reported history of right orbital biopsy yielding pathology consistent with idiopathic inflammation.    TECHNIQUE:  Noncontrast CT images were obtained through the brain and facial bones.    COMPARISON:  CT sinus 03/27/2017, MRI brain and orbits 02/15/2016.     FINDINGS:  CT facial bones:   Large soft tissue lesion centered within the lateral intraconal and extraconal right orbit has significantly increased in size compared to the CT dated 03/27/2017. The lesion is inseparable from the right superior, lateral, and inferior rectus muscles, as well as the right lacrimal gland. The lesion demonstrates extension posteriorly slightly proximal to the orbital apex, as well as extension into the medial orbit superiorly and anteriorly. Mass effect includes medial bowing of the optic nerve sheath complex and pronounced proptosis of the right globe.  No mass is identified in the right orbit.  Torus palatinus.   Minimal mucosal thickening in the ethmoid air cells. The remainder of the visualized paranasal sinuses are clear.  CT brain:  The ventricles and sulci within normal limits for patient age. No mass effect or midline shift. The gray-white differentiation is maintained.  No acute intracranial hemorrhage or pathologic extra-axial fluid collection.  The calvarium is intact. The mastoid air cells are clear.    IMPRESSION:  1. Large soft tissue lesion centered within the lateral intraconal and extraconal right orbit has significantly increased in size compared to the CT dated 03/27/2017. The lesion is inseparable from the right lacrimal gland and rectus musculature. Mass effect includes medial bowing of the optic nerve sheath complex and pronounced proptosis of the right globe. Given provided history, findings may represent a  progressive inflammatory etiology (pseudotumor). Other differential considerations, such as sarcoidosis or lymphoma, could also have this appearance.  2. No acute intracranial hemorrhage or intracranial mass effect.    Please note that all CT scans at this facility use dose modulation, iterative reconstruction, and/or weight-based dosing when appropriate to reduce radiation dose to as low as reasonably achievable.    Dictated by Charles Ward MD @ Jul 16 2018  3:54PM    Signed by Dr. Charles Ward @ Jul 16 2018  4:20PM    CT ORBITAL WO CONTRAST 7/11/2019 3:42 PM     History: orbital pseudotumor; Subjective visual disturbance; Visual  field defect; Idiopathic orbital inflammatory syndrome  ICD-10: Subjective visual disturbance; Visual field defect; Idiopathic  orbital inflammatory syndrome     Comparison: CT 3/6/2019 and 7/16/2018, MRI brain 5/9/2017                                                                   Impression:   1. 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  2. Slightly decreased right orbital proptosis since 3/6/2019.      ROS:  A comprehensive ROS was done, positives are per HPI.        HISTORY REVIEW:  Past Medical History:   Diagnosis Date     Abnormal glandular Papanicolaou smear of cervix 1992     Allergic rhinitis     Allergy, airborne subst     Arthritis      ASCVD (arteriosclerotic cardiovascular disease)      Chronic pain      Chronic pancreatitis (H)     idiopathic, Tx: PPI, antioxidants, pancreatic enzymes     Common migraine      Coronary artery disease      Costochondritis      Difficulty in walking(719.7)      Dyspnea on exertion      Ectasia, mammary duct     [facility], persistent nipple discharge     Elevated fasting glucose      Gastro-oesophageal reflux disease      Hernia       History of angina      Hyperlipidemia LDL goal < 100      Hypertension goal BP (blood pressure) < 140/90     Essential hypertension     Iron deficiency anemia      Ischemic cardiomyopathy      Menorrhagia      Migraine headaches      Mild persistent asthma      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 Matias, neg w/u, Rx'd w/prednisone     Ventral hernia, unspecified, without mention of obstruction or gangrene 2012     Past Surgical History:   Procedure Laterality Date     C ECHO HEART XTHORACIC,COMPLETE, W/O DOPPLER  04    [facility] WNL, EF 60%     C/SECTION, LOW TRANSVERSE           CARDIAC SURGERY      cardiac stent- recent in 16; 4 other stents     DILATION AND CURETTAGE N/A 2016    Procedure: DILATION AND CURETTAGE;  Surgeon: Nahed Butler MD;  Location: [facility] OR     HC UGI ENDOSCOPY W EUS  08    Dr. Pastrana, possible chronic pancreatitis, fatty liver     [place] [facility] [place] INTRAUTERINE DEVICE N/A 2016    Procedure: INSERT INTRAUTERINE DEVICE;  Surgeon: Nahed Butler MD;  Location: [facility]     INT UTERINE FIBRIOD EMBOLIZATION  10/29/2014     LAPAROSCOPIC CHOLECYSTECTOMY  08    Dr. Arnol GRUBBS TX, CERVICAL      s/p LEEP     ORBITOTOMY Right 3/15/2016    Procedure: ORBITOTOMY;   Surgeon: Myron Cyr MD;  Location: [facility]     ORBITOTOMY Right 2017    Procedure: ORBITOTOMY;  RIGHT ORBITOTOMY AND BIOPSY;  Surgeon: Charis Holbrook MD;  Location: [facility]     REPAIR PTOSIS Right 2017    Procedure: REPAIR PTOSIS;  RIGHT UPPER LID PTOSIS REPAIR;  Surgeon: Myron Cyr MD;  Location: [facility]     UPPER GI ENDOSCOPY  10/21/08    mild gastritis, Dr. Hidalgo     Family History   Problem Relation Age of Onset     Heart Disease Father 50        heart attack     Cerebrovascular Disease Father      Diabetes Father      Hypertension Father      Depression Father      C.A.D. Father      Hypertension Mother      Arthritis Mother      Heart Disease Mother         long qt syndrome     Thyroid Disease Mother      C.A.D. Mother      Heart Disease Brother 15        MI at 15, 16.      Diabetes Maternal Uncle      Hypertension Maternal Aunt      Hypertension Maternal Uncle      Arthritis Brother         he passed away, had severe arthritis at age 11     Arthritis Maternal Uncle      Eye Disorder Maternal Uncle         cataracts     Neurologic Disorder Sister         migraines     Neurologic Disorder Sister         migraines     Respiratory Son         asthma     Cerebrovascular Disease Maternal Uncle      C.A.D. Brother      Family History Negative Other         neg for RA, SLE     Unknown/Adopted No family hx of         unknown neurological issues in her family, mother was adopted     Skin Cancer No family hx of         No known family hx of skin cancer     Social History     Socioeconomic History     Marital status: Single     Spouse name: Not on file     Number of children: 1     Years of education: Not on file     Highest education level: Not on file   Occupational History     Employer: NONE    Social Needs     Financial resource strain: Not on file     Food insecurity:     Worry: Not on file     Inability: Not on file     Transportation needs:     Medical: Not on file      Non-medical: Not on file   Tobacco Use     Smoking status: Current Every Day Smoker     Packs/day: 0.20     Years: 1.00     Pack years: 0.20     Types: Cigarettes     Last attempt to quit: 2/1/2016     Years since quitting: 3.7     Smokeless tobacco: Never Used   Substance and Sexual Activity     Alcohol use: No     Alcohol/week: 0.0 standard drinks     Drug use: No     Sexual activity: Not Currently   Lifestyle     Physical activity:     Days per week: Not on file     Minutes per session: Not on file     Stress: Not on file   Relationships     Social connections:     Talks on phone: Not on file     Gets together: Not on file     Attends Cheondoism service: Not on file     Active member of club or organization: Not on file     Attends meetings of clubs or organizations: Not on file     Relationship status: Not on file     Intimate partner violence:     Fear of current or ex partner: Not on file     Emotionally abused: Not on file     Physically abused: Not on file     Forced sexual activity: Not on file   Other Topics Concern     Parent/sibling w/ CABG, MI or angioplasty before 65F 55M? Yes   Social History Narrative    Balanced Diet - sometimes    Osteoporosis Prevention Measures - Dairy servings per day: 2 servings weekly    Regular Exercise -  Yes Describe walking 4 times a week    Dental Exam - NO    Seatbelts used - Yes    Self Breast Exam - Yes    Abuse: Current or Past (Physical, Sexual or Emotional)- No    Do you have any concerns about STD's -  No    Do you feel safe in your environment - No    Guns stored in the home - No    Sunscreen used - Yes    Lipids -  YES - Date: 053102    Glucose -  YES - Date: 012804    Eye Exam - YES - Date: one year ago    Colon Cancer Screening - No    Hemoccults - NO    Pap Test -  YES - Date: 070904, remote history of LEEP    Mammography - YES - Date: last spring, would like to discuss, needs a referral to [facility] center    DEXA - NO    Immunizations reviewed and  up to date - Yes, last td given in  or      Patient Active Problem List   Diagnosis     Seasonal affective disorder (H)     Allergic rhinitis     PCOS (polycystic ovarian syndrome)     Moderate persistent asthma     Chronic pancreatitis (H)     Hypertension goal BP (blood pressure) < 140/90     Common migraine     NSTEMI (non-ST elevated myocardial infarction) (H)     Hyperlipidemia LDL goal <70     ASCVD (arteriosclerotic cardiovascular disease)     GERD (gastroesophageal reflux disease)     Ischemic cardiomyopathy     Hypertensive heart disease     Uterine leiomyoma     Iron deficiency anemia     Costochondritis     Vitamin D deficiency     Breast cancer screening     Spinal stenosis in cervical region     Fibromyalgia     Seronegative rheumatoid arthritis (H)     Type 2 diabetes, HbA1C goal < 8% (H)     Type 2 diabetes mellitus with other specified complication (H)     Hyperlipidemia associated with type 2 diabetes mellitus (H)     Hypertension associated with diabetes (H)     Overweight with body mass index (BMI) 25.0-29.9     Tobacco use disorder     Telogen effluvium     CAD S/P percutaneous coronary angioplasty     Status post coronary angiogram     ANCA-associated vasculitis (H)     Wegener's vasculitis (H)     Type 1 diabetes mellitus with complications (H)       Pregnancy Hx: She is . All misscarriages were in first trimester. H/o OC use in the past. 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    PMHx, FHx, SHx were reviewed, unchanged.      Outpatient Encounter Medications as of 10/29/2019   Medication Sig Dispense Refill     acetaminophen (TYLENOL) 325 MG tablet Take 1-2 tablets (325-650 mg) by mouth every 6 hours as needed for pain (headache) 250 tablet 0     albuterol (2.5 MG/3ML) 0.083% neb solution INL 1 VIAL VIA NEBULIZATION Q 4 TO 6 HOURS PRN  1     albuterol (PROAIR HFA, PROVENTIL HFA, VENTOLIN HFA) 108 (90 BASE) MCG/ACT inhaler Inhale 2 puffs into the lungs every 6  hours as needed for shortness of breath / dyspnea or wheezing 3 Inhaler 1     aspirin (ASPIRIN LOW DOSE) 81 MG EC tablet Take 1 tablet (81 mg) by mouth daily 30 tablet 11     BASAGLAR 100 UNIT/ML injection Inject 40 Units Subcutaneous daily (Patient taking differently: Inject 52 Units Subcutaneous daily ) 12 mL 2     blood glucose monitoring (NO BRAND SPECIFIED) meter device kit Use to test blood sugar 4 X times daily or as directed. (Patient taking differently: 1 kit Use to test blood sugar 4 X times daily or as directed.) 1 kit 0     blood glucose monitoring (NO BRAND SPECIFIED) test strip 1 strip by In Vitro route 4 times daily Test as directed. Please dispense three months and three months of refills. Thank you. (Patient taking differently: 1 strip by In Vitro route 4 times daily Test as directed. Please dispense three months and three months of refills. Thank you.) 360 each 3     Blood Pressure Monitor KIT 1 each daily Monitor home blood pressure as instructed by physician.  Dispense [place] blood pressure kit. 1 kit 0     calcium carbonate (TUMS) 500 MG chewable tablet Take 3-4 chew tab by mouth daily as needed.       clopidogrel (PLAVIX) 75 MG tablet Take 1 tablet (75 mg) by mouth daily 30 tablet 11     COMPRESSION STOCKINGS 2 each daily Apply one 10-15 mmHg compression stocking to each lower extgmierty during the day and remove before bedtime. 4 each 2     cyclobenzaprine (FLEXERIL) 10 MG tablet Take 1 tablet (10 mg) by mouth 2 times daily as needed for muscle spasms 60 tablet 2     cycloSPORINE (RESTASIS) 0.05 % ophthalmic emulsion Place 1 drop into both eyes 2 times daily 60 each 11     cycloSPORINE (RESTASIS) 0.05 % ophthalmic emulsion Place 1 drop into the right eye every 12 hours       dicyclomine (BENTYL) 20 MG tablet TAKE 1 TABLET(20 MG) BY MOUTH FOUR TIMES DAILY AS NEEDED 60 tablet 3     diphenhydrAMINE (BENADRYL) 25 MG capsule TK 1 TO 2 CS PO QHS  4     EPIPEN 2-RIKY 0.3 MG/0.3ML injection INJECT 0.3  MG INTO THE MUSCLE PRF ANAPHYALAXIS  0     fexofenadine (ALLEGRA) 180 MG tablet Take 1 tablet by mouth daily as needed. 90 tablet 3     desonide (DESOWEN) 0.05 % cream Apply topically 2 times daily       ferrous gluconate (FERGON) 324 (38 Fe) MG tablet Take 1 tablet (324 mg) by mouth 2 times daily (with meals) (Patient not taking: Reported on 10/29/2019) 180 tablet 3     fluocinolone (SYNALAR) 0.01 % solution Apply topically daily as needed       fluticasone-vilanterol (BREO ELLIPTA) 100-25 MCG/INH inhaler Inhale 1 puff into the lungs daily       folic acid (FOLVITE) 1 MG tablet Take 1 tablet by mouth daily (Patient not taking: Reported on 10/29/2019) 90 tablet 3     hydroxychloroquine (PLAQUENIL) 200 MG tablet Take 2 tablets (400 mg) by mouth daily 180 tablet 1     insulin pen needle (BD MARCK U/F) 32G X 4 MM USE DAILY OR AS DIRECTED 300 each 3     ketoconazole (NIZORAL) 2 % shampoo Apply topically daily as needed       lifitegrast (XIIDRA) 5 % opthalmic solution Place 1 drop into both eyes 2 times daily (Patient not taking: Reported on 6/3/2019) 20 each 3     lisinopril-hydrochlorothiazide (PRINZIDE/ZESTORETIC) 20-25 MG tablet Take 1 tablet by mouth daily 30 tablet 11     Magnesium Oxide -Mg Supplement 250 MG TABS TK 1 T PO BID. REDUCE IF STOOLS LOOSEN  11     metFORMIN (GLUCOPHAGE-XR) 500 MG 24 hr tablet TAKE 2 TABLETS(1000 MG) BY MOUTH DAILY WITH DINNER 180 tablet 0     metoclopramide (REGLAN) 10 MG tablet Take 1 tablet (10 mg) by mouth 4 times daily (before meals and nightly) 90 tablet 0     metoprolol succinate ER (TOPROL-XL) 100 MG 24 hr tablet Take 1 tablet (100 mg) by mouth daily 30 tablet 11     metroNIDAZOLE (NORITATE) 1 % cream Apply topically daily       montelukast (SINGULAIR) 10 MG tablet Take 1 tablet (10 mg) by mouth At Bedtime 30 tablet 1     Multiple Vitamin (DAILY-GERALD) TABS Take 1 tablet by mouth daily  0     nitroGLYcerin (NITROSTAT) 0.4 MG sublingual tablet Place 1 tablet (0.4 mg) under the  tongue every 5 minutes as needed for chest pain 25 tablet 1     nystatin (MYCOSTATIN) 358236 UNITS TABS tablet TK 2 TS PO BID  0     ondansetron (ZOFRAN-ODT) 8 MG ODT tab Take 1 tablet (8 mg) by mouth every 8 hours as needed for nausea 60 tablet 1     pantoprazole (PROTONIX) 40 MG EC tablet Take 1 tablet (40 mg) by mouth daily Pork free tablets. 30 tablet 1     pravastatin (PRAVACHOL) 40 MG tablet Take 1 tablet (40 mg) by mouth daily 90 tablet 2     ranitidine (ZANTAC) 150 MG tablet Take 1 tablet (150 mg) by mouth 2 times daily 180 tablet 1     spironolactone (ALDACTONE) 50 MG tablet Take 1 tablet (50 mg) by mouth daily . Take additional 0.5 tablets by mouth once daily as needed for weight gain. 90 tablet 2     sucralfate (CARAFATE) 1 GM tablet Take 1 tablet (1 g) by mouth 4 times daily 120 tablet 3     traMADol (ULTRAM) 50 MG tablet Take 1 tablet (50 mg) by mouth every 8 hours as needed for moderate pain 60 tablet 3     Triamcinolone Acetonide (NASACORT ALLERGY 24HR NA)        vitamin D (ERGOCALCIFEROL) 01354 UNIT capsule Take 1 capsule (50,000 Units) by mouth every 7 days Need a Vitamin D level in 2 months. (Patient taking differently: Take 50,000 Units by mouth every 7 days Need a Vitamin D level in 2 months.) 8 capsule 0     No facility-administered encounter medications on file as of 10/29/2019.        Allergies   Allergen Reactions     Amoxicillin-Pot Clavulanate      Augmentin      Unknown possible hives and edema     Azithromycin      Diatrizoate Other (See Comments)     Pt wants this listed because she is allergic to shellfish      Imitrex [Sumatriptan]      Severe face/neck/chest tightness and flushing side effects      Penicillins Hives     Unknown      Pork Allergy      Stomach pain, cramping, diarrhea, itching, nausea and headaches     Shellfish Allergy Hives and Swelling     Sulfa Drugs Hives and Swelling     Zithromax [Azithromycin Dihydrate] Swelling     Unknown          Ph.E:    Vitals:    10/29/19  1417   BP: 122/75   BP Location: Right arm   Patient Position: Sitting   Cuff Size: Adult Regular   Pulse: 79   Temp: 98.2  F (36.8  C)   TempSrc: Oral   SpO2: 99%   Weight: 75.7 kg (166 lb 12.8 oz)           Constitutional: WD/WN. Pleasant. In no acute distress.   Eyes: Swelling around R eye significantly improved, not clear swelling today. EOMI. Mild fullness still appreciable on R globe.  HEENT: No oral ulcers or thrush. Normal salivary pool.  Lymph: No cervical, supraclavicular, or axillary LAD.  Chest: Clear to auscultation bilaterally, Normal work of breathing.  CV: RRR, no murmurs/ rubs or gallops. No edema, clubbing or cyanosis.   GI: Abdomen is soft and non tender.  MS: No synovitis or joint tenderness. Cool joints. No joint deformities. Full ROM of the joints. No nodules.   Skin: No rashes or lesions noted. No malar rash.  Neuro: A&O x 3. Grossly non focal, muscular power 5/5 in all ext  Psych: NL affect and mood    Assessment/ plan:    1-Seropositive non-erosive RA (arthritis, AM stiffness, high CRP, RF 26 but re-check neg 3/2015 on HCQ) Dx 5/2013, FMS, new pleuritic CP 3/20-15-5/2015 resolved on steroids. GPA. She is on  mg bid since 5/2014. She tolerates it well and it's helping some but not enough to control all her pain. Continues having flare ups of joint pain, swelling also has FMS. Joint sx are getting worse. Had high ESR/CRP in 3/2015 and new pleuritic CP between 3/2015-5/2015 which resolved after taking medrol dose pack prescribed 5/20/2015. Her pleuritic CP could be related to her RA. Then stopped tramadol as it blames it for recent episodes of CP.    In the past, MTX was recommended, she declined.    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 Etiology of mass unclear, but it's inflammatory and related to pt's  underlying autoimmune disease (inflammatory arthritis, serositis). 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    Her work up showed borderline + ALLI and slightly elevated MPO. I told her prednisone is not good for her diabetes and weight gain. Recommended a trial of MTX as next step. Discussed risks on details. I called her neuro-ophthalmologist at last visit who agreed with trial of MTX (she suspects pseudo-sarcoidosis or sarcoid like disease but no granuloma and ACE not elevated).     Unfortunately despite all my efforts to explain risks vs benefits (more than risks) of MTX as steroid sparing gent, Janine declined to try MTX. I was very concerned about her R orbital inflammatory mass as there is high chance of flare up off steroids and steroid causes her weigh gain and uncontrolled diabetes. I even offered her other steroid sparing gents like imuran. She declined that as well.    Her inflammation around R orbit has recurred now. On 4/7/2017, I spent quite amount of time discussing a trial of MTX. After discussing risks/benefits, she agreed to try it.     She is s/p Tx with MTX 10 mg po qwk 4/2017-5/2017. No benefits and more GI SEs, more hair loss and headaches since start of MTX. No benefits. I called and spoke with her neuro-ophthalmologist who recommended a second opinion from neuro-ophthalmology at the . I suspect her presentation to be ANCA vasculitis related but wanted to repeat imaging and get neuro-ophthalmology eval here. She was recommended to have repeat biopsy to r/o lymphoma.    In 5/2017, prescribed her prednisone 40-30-20-10 mg a day each for 3 days then stop (she declined higher dose and longer taper despite my concern for worsening swelling around orbit), Her R campos-orbital edema significantly improved. MTX was stopped in 5/2017 given side effects. Plan was to start imuran 50 mg po qd (NL TPMT); however we decided to hold off till she gets  biopsy done.    Repeat R lacrimal gland biopsy in 8/2017 showed non specific inflammation, it ruled out lymphoma. Her R orbital swelling is improved but still present. After her biopsy I contacted her to schedule a f/u visit with me to discuss plan of care but she declined till today's visit.    She did not tolerate AZA because of GI SEs.    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     This is definitely an inflammatory process and is steroid responsive, she had local kenalog inj in 8/2017 at the time of biopsy which has helped. Given her diabetes and long term SE of prednisone, highly recommend steroid sparing agent to prevent progression/recurrence of R campos-orbital swelling. Since she did not tolerate MTX and AZA, recommend rituximab as next step.     In 2/2018, I spent 30 minutes discussing test results, plan of care, rituximab SEs including rare risk of PML. She declined rituximab with concern for SEs.    Unfortunately lost f/u sine 2/2018. In 9/2018, was back with her R eye being much worse, swelling around R orbit was severe and is pushing down her eye. She decided to see an ophthalmologist at [place], was seen 8/29, was told to have GPA.    Janine is frustrated with her eye condition, saying nobody told her that she has GPA or Wegener's which is a serious condition and people could die from it.    I reminded her that I discussed the Dx of ANCA-vasculitis which is just another name for Wegener's with her many times but she always declined induction Tx rituximab at last visit in 9/2018. I put her on prednisone 30 mg every day in 9/2018, now she is off, stopped 6 wk after surgery. Does not like SEs including worsening BG.    CT chest/abdomen/pelvis 4/2017 was neg for malignancy. Labs in  4/2017 showed +p-ANCA and MPO with NL ESR/CRP. Repeat MPO was positive in 6/2017.    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 Shah and Dr. Valdez at [place]. 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 Not happy with results, on my exam, her eye swelling/pain significantly improved. She wants to transfer care to here, I advised her to make an appointment with Dr. Saulo GREEN for f/u and referred her to Dr. Vasquez to assess if she has sinus involvement by GPA given facial pain.    After my original recommendation to receive rituximab (FDA approved for GPA) in 2/2018. She finally agreed to it, had 1 gr q2wk x 2 on 12/12/2018 and 12/26/2018. Had minor allergic reaction which was managed by extra dose of steroid and benadryl. She refuses to do prednisone taper given h/o diabetes, GIB on prednisone. I told her it would take 1 mo for rituximab to show benefit, if she continues to have active disease, recommend trial of cytoxan.     Patient received Rituxan again (6/3/19 & 6/17/19) 6 months after first round. Repeat CT scan show continued inflammatory changes in the R orbit, but decreased proptosis. We discussed cytoxan again but she said it is out of question and she declined considering it. Therefore, will continue with q6mo rituximab with hope that inflammation goes down. Patient is not on any prednisone. Headaches have improved since surgery and rituximab treatment but she reports pain/swelling around R eye, not much swelling on exam.    Today's plan:    Follow up with Dr. Vernon needs to be scheduled in 1/2020 or sooner    Lidocaine cream and ketoprofen powder for knee  pain    Labs today, if worsened labs, recommend to give rituximab 1 gram every 2 weeks x 2 in Nov 2019; otherwise we'll do it in December 2019 as  planned      Continue accupuncture (referral was placed as it helps with chronic pain).     My impression is that her arthralgias are a combination of IA, fibromyalgia and chronic pain.   Stopped HCQ at last visit, shortly after resumed taking it as arthralgia recurred. Continue HCQ. Eye exam is updated.    2-Fad pads. She was seen by endocrinology and cushing was ruled out in 4/2014. Was advised to do f/u for enlarged thyroid/thyroid nodules.    3-Hair loss. F/U with dermatology    4-Chronic pain. F/U with pain clinic    5-Concerns of subclinical hyperthyroidism: TSH is barely minimal, chart review shows that those lowest levels are since April 2014. At last visit, discussed Endocrinology ref which pt wants to hold off in this visit.     6-Vit D deficiency. Was replaced with vit D 50, 000 units po qwk x 12 wk then 2000 units every day    7-Upper extremity swelling. Recent mammogram without suggestion of malignancy. No LAD of axillary region. Arms appear normal on physical exam, however patient reports intermittent swelling.  - Referral to lymphedema clinic was done in the past.      PLAN: Return in 3-4 months       MEDICATION CHANGES: as above        Orders Placed This Encounter   Procedures     AST     ALT     Vasculitis panel     Albumin level     CBC with platelets differential     Creatinine     CRP inflammation     UA with Microscopic reflex to Culture     Protein  random urine with Creat Ratio     Creatinine random urine     Erythrocyte sedimentation rate auto     CD19 B Cell Count     ANCA IgG by IFA with Reflex to Titer     Immunoglobulins A G and M     Vitamin D Deficiency     Vitamin B12     Ferritin     Magnesium     Again, thank you for allowing me to participate in the care of your patient.      Sincerely,    Majo Mckinnon MD

## 2019-10-29 NOTE — LETTER
Patient:  Janine Cornell  :   1965  MRN:     [id number]        Ms.Lisa CHELLE Cornell  [address] [facility] [address]        2019    Dear ,    We are writing to inform you of your test results. It is normal.      Results for orders placed or performed in visit on 10/29/19   Magnesium   Result Value Ref Range    Magnesium 1.7 1.6 - 2.3 mg/dL     *Note: Due to a large number of results and/or encounters for the requested time period, some results have not been displayed. A complete set of results can be found in Results Review.       Majo Mckinnon MD

## 2019-10-29 NOTE — LETTER
Patient:  Janine Cornell  :   1965  MRN:     [id number]        Ms.Lisa CHELLE Cornell  [address] [facility] [address]        2019    Dear ,    We are writing to inform you of your test results. Labs are actually stable. Please go a head and schedule your rituximab for first week of December. I faxed a vitamin D prescription to your pharmacy to replace low vitamin D.      Results for orders placed or performed in visit on 10/29/19   Ferritin   Result Value Ref Range    Ferritin 67 8 - 252 ng/mL   Vitamin B12   Result Value Ref Range    Vitamin B12 592 193 - 986 pg/mL   Vitamin D Deficiency   Result Value Ref Range    Vitamin D Deficiency screening 29 20 - 75 ug/L   Immunoglobulins A G and M   Result Value Ref Range     (L) 695 - 1,620 mg/dL     70 - 380 mg/dL    IGM 46 (L) 60 - 265 mg/dL   ANCA IgG by IFA with Reflex to Titer   Result Value Ref Range    Neutrophil Cytoplasmic Antibody <1:10 <1:10 [titer]    Neutrophil Cytoplasmic Antibody Pattern       The ANCA IFA is <1:10.  No further testing will be performed.   CD19 B Cell Count   Result Value Ref Range    CD19 B Cells <1 (L) 6 - 27 %    Absolute CD19 <1 (L) 107 - 698 cells/uL   Erythrocyte sedimentation rate auto   Result Value Ref Range    Sed Rate 10 0 - 30 mm/h   Protein  random urine with Creat Ratio   Result Value Ref Range    Protein Random Urine 0.26 g/L    Protein Total Urine g/gr Creatinine 0.13 0 - 0.2 g/g Cr   UA with Microscopic reflex to Culture   Result Value Ref Range    Color Urine Yellow     Appearance Urine Clear     Glucose Urine 50 (A) NEG^Negative mg/dL    Bilirubin Urine Negative NEG^Negative    Ketones Urine 5 (A) NEG^Negative mg/dL    Specific Gravity Urine 1.023 1.003 - 1.035    Blood Urine Negative NEG^Negative    pH Urine 5.0 5.0 - 7.0 pH    Protein Albumin Urine Negative NEG^Negative mg/dL    Urobilinogen mg/dL 0.0 0.0 - 2.0 mg/dL    Nitrite Urine Negative NEG^Negative    Leukocyte  Esterase Urine Negative NEG^Negative    Source Unspecified Urine     WBC Urine <1 0 - 5 /HPF    RBC Urine 2 0 - 2 /HPF    Mucous Urine Present (A) NEG^Negative /LPF    Hyaline Casts 5 (H) 0 - 2 /LPF   CRP inflammation   Result Value Ref Range    CRP Inflammation <2.9 0.0 - 8.0 mg/L   Creatinine   Result Value Ref Range    Creatinine 1.04 0.52 - 1.04 mg/dL    GFR Estimate 61 >60 mL/min/[1.73_m2]    GFR Estimate If Black 71 >60 mL/min/[1.73_m2]   CBC with platelets differential   Result Value Ref Range    WBC 11.6 (H) 4.0 - 11.0 10e9/L    RBC Count 4.73 3.8 - 5.2 10e12/L    Hemoglobin 12.3 11.7 - 15.7 g/dL    Hematocrit 39.1 35.0 - 47.0 %    MCV 83 78 - 100 fl    MCH 26.0 (L) 26.5 - 33.0 pg    MCHC 31.5 31.5 - 36.5 g/dL    RDW 13.8 10.0 - 15.0 %    Platelet Count 371 150 - 450 10e9/L    Diff Method Automated Method     % Neutrophils 75.6 %    % Lymphocytes 14.8 %    % Monocytes 5.3 %    % Eosinophils 3.5 %    % Basophils 0.5 %    % Immature Granulocytes 0.3 %    Nucleated RBCs 0 0 /100    Absolute Neutrophil 8.8 (H) 1.6 - 8.3 10e9/L    Absolute Lymphocytes 1.7 0.8 - 5.3 10e9/L    Absolute Monocytes 0.6 0.0 - 1.3 10e9/L    Absolute Eosinophils 0.4 0.0 - 0.7 10e9/L    Absolute Basophils 0.1 0.0 - 0.2 10e9/L    Abs Immature Granulocytes 0.0 0 - 0.4 10e9/L    Absolute Nucleated RBC 0.0    Albumin level   Result Value Ref Range    Albumin 4.3 3.4 - 5.0 g/dL   Vasculitis panel   Result Value Ref Range    Myeloperoxidase Antibody IgG 1.2 (H) 0.0 - 0.9 AI    Proteinase 3 Antibody IgG <0.2 0.0 - 0.9 AI   ALT   Result Value Ref Range    ALT 26 0 - 50 U/L   AST   Result Value Ref Range    AST 17 0 - 45 U/L   Creatinine urine calculation only   Result Value Ref Range    Creatinine Urine 207 mg/dL     *Note: Due to a large number of results and/or encounters for the requested time period, some results have not been displayed. A complete set of results can be found in Results Review.       Majo Mckinnon MD

## 2019-10-30 DIAGNOSIS — E55.9 VITAMIN D DEFICIENCY: ICD-10-CM

## 2019-10-30 LAB
ANCA AB PATTERN SER IF-IMP: NORMAL
C-ANCA TITR SER IF: NORMAL {TITER}
CD19 CELLS # BLD: <1 CELLS/UL (ref 107–698)
CD19 CELLS NFR BLD: <1 % (ref 6–27)
DEPRECATED CALCIDIOL+CALCIFEROL SERPL-MC: 29 UG/L (ref 20–75)
IGA SERPL-MCNC: 238 MG/DL (ref 70–380)
IGG SERPL-MCNC: 682 MG/DL (ref 695–1620)
IGM SERPL-MCNC: 46 MG/DL (ref 60–265)
MYELOPEROXIDASE AB SER-ACNC: 1.2 AI (ref 0–0.9)
PROTEINASE3 IGG SER-ACNC: <0.2 AI (ref 0–0.9)

## 2019-10-30 RX ORDER — ERGOCALCIFEROL 1.25 MG/1
50000 CAPSULE, LIQUID FILLED ORAL
Qty: 12 CAPSULE | Refills: 0 | Status: SHIPPED | OUTPATIENT
Start: 2019-10-30 | End: 2020-02-04

## 2019-10-30 NOTE — TELEPHONE ENCOUNTER
A prior authorization is needed for the following compounded medications prescribed.  Please complete a prior authorization with the information included below.    Medication:Ketoprofen 10% PLO (SSLS)    Ingredients                                                                  NDCs                                                Quantities                       Ketoprofen Powd                                                 [address]-89                                              6.0g  Salt stable LS ADV Cream                                  [address]-28                                              54.0g                                                                                                RX #:[id number]  Reason for Rejection: Product Not Covered    Pharmacy Insurance plan: [facility]  BIN #:[id number]  ID #:[id number]  PCN #:[place]  Phone #:[phone]      Pharmacy NPI:[id number]      Please advise the pharmacy when the prior authorization is approved or denied.     Thank you for your time.    Jennifer Quintana    Compounding Pharmacy Technician  [facility]   [address] [place] [id number]   Phone: [phone]  Fax: [phone]

## 2019-10-31 NOTE — RESULT ENCOUNTER NOTE
Labs are actually stable. Please go a head and schedule your rituximab for first week of December. I faxed a vitamin D prescription to your pharmacy to replace low vitamin D.

## 2019-11-01 NOTE — TELEPHONE ENCOUNTER
Central Prior Authorization Team   Phone: [phone]      PA Initiation via Fax    Medication: Ketoprofen 10% PLO (SSLS)  Insurance Company: [employer] - Phone [phone] Fax [phone]  Pharmacy Filling the Rx: [facility] - [place], [place] - [address]  Filling Pharmacy Phone: [phone]  Filling Pharmacy Fax:    Start Date: 11/1/2019

## 2019-11-04 ENCOUNTER — TELEPHONE (OUTPATIENT)
Dept: RHEUMATOLOGY | Facility: CLINIC | Age: 54
End: 2019-11-04

## 2019-11-04 DIAGNOSIS — R10.13 ABDOMINAL PAIN, EPIGASTRIC: ICD-10-CM

## 2019-11-04 RX ORDER — METHYLPREDNISOLONE SODIUM SUCCINATE 125 MG/2ML
125 INJECTION, POWDER, LYOPHILIZED, FOR SOLUTION INTRAMUSCULAR; INTRAVENOUS ONCE
Status: CANCELLED | OUTPATIENT
Start: 2019-12-01

## 2019-11-04 RX ORDER — ACETAMINOPHEN 325 MG/1
650 TABLET ORAL ONCE
Status: CANCELLED
Start: 2019-12-01

## 2019-11-04 NOTE — TELEPHONE ENCOUNTER
PRIOR AUTHORIZATION DENIED    Medication: Ketoprofen 10% PLO (SSLS)-DENIED    Denial Date: 11/1/2019    Denial Rational:            Appeal Information:

## 2019-11-04 NOTE — TELEPHONE ENCOUNTER
We are writing to inform you of your test results. Labs are actually stable. Please go a head and schedule your rituximab for first week of December. I faxed a vitamin D prescription to your pharmacy to replace low vitamin D.    Infusion plan cued up for provider to review and sign.  Will notify pt once infusion plan signed.    Desiree Godinez RN  Rheumatology Clinic

## 2019-11-05 RX ORDER — SUCRALFATE 1 G/1
1 TABLET ORAL 4 TIMES DAILY
Qty: 360 TABLET | Refills: 0 | Status: SHIPPED | OUTPATIENT
Start: 2019-11-05 | End: 2021-05-18

## 2019-11-05 NOTE — TELEPHONE ENCOUNTER
sucralfate (CARAFATE) 1 GM tablet     Last Written Prescription Date:  11/1/20108  Last Fill Quantity: 120,   # refills: 3  Last Office Visit : 11/9/2018  Future Office visit:  None  #360 Tabs,  0 Refills sent to pharmacy for Pt care.  11/6/2019      Georgia Mercedes RN  Central Triage Red Flags/Med Refills

## 2019-11-05 NOTE — TELEPHONE ENCOUNTER
Called and spoke with pt, she got letter and is aware to schedule rituximab and that she should take Vitamin D.  Pt will schedule rituximab herself.    Desiree Godinez RN  Rheumatology Clinic

## 2019-11-06 ENCOUNTER — TELEPHONE (OUTPATIENT)
Dept: RHEUMATOLOGY | Facility: CLINIC | Age: 54
End: 2019-11-06

## 2019-11-06 NOTE — TELEPHONE ENCOUNTER
Prior Authorization Retail Medication Request    Medication/Dose: Voltaren (diclofenac) 1% gel  ICD code (if different than what is on RX):    Previously Tried and Failed:  Ketoprofen not approved  Rationale:  Topical to help with joint pain    Insurance Name:  [facility]/[employer]  Insurance ID:  [id number]       Pharmacy Information (if different than what is on RX)  Name:    Phone:

## 2019-11-07 NOTE — TELEPHONE ENCOUNTER
Central Prior Authorization Team   [phone]    PA Initiation    Medication: Voltaren (diclofenac) 1% gel  Insurance Company: [employer] - Phone [phone] Fax [phone]  Pharmacy Filling the Rx: [id number] [address] AT [address] [place] [address] & [facility]  Filling Pharmacy Phone: [phone]  Filling Pharmacy Fax: [phone]  Start Date: 11/7/2019

## 2019-11-08 ENCOUNTER — TELEPHONE (OUTPATIENT)
Dept: RHEUMATOLOGY | Facility: CLINIC | Age: 54
End: 2019-11-08

## 2019-11-08 NOTE — TELEPHONE ENCOUNTER
Called and updated pt about Voltaren gel. She will .     Desiree Godinez RN  Rheumatology Clinic

## 2019-11-08 NOTE — TELEPHONE ENCOUNTER
Pt is wondering what the plan is for her rituximab going forward, if she is going to have it every 4 or 6 months?  The reason she is asking is that if she is going to have it every 4 months, she is requesting that we put the infusion plan in place as soon as the 1st of the year comes around. She would like to be able to set up appts in as much of advance as possible.    Desiree Godinez RN  Rheumatology Clinic

## 2019-11-12 ENCOUNTER — TELEPHONE (OUTPATIENT)
Dept: FAMILY MEDICINE | Facility: CLINIC | Age: 54
End: 2019-11-12

## 2019-11-12 DIAGNOSIS — K58.8 OTHER IRRITABLE BOWEL SYNDROME: ICD-10-CM

## 2019-11-12 RX ORDER — DICYCLOMINE HCL 20 MG
TABLET ORAL
Qty: 60 TABLET | Refills: 0 | Status: SHIPPED | OUTPATIENT
Start: 2019-11-12 | End: 2023-01-19

## 2019-11-12 NOTE — TELEPHONE ENCOUNTER
----- Message from Sally Slaughter RN sent at 11/12/2019  2:40 PM CST -----  Regarding: scheduling - pt of Dr. Hitchcock  Please call to schedule.    Patient is overdue for annual clinic visit - unless otherwise indicated patient needs to be scheduled with 1st available.    Patient may need labs and or imaging - please check with RN care coordinator.    Thanks    I do not need any follow up on the scheduling of this appointment unless the patient will no longer be receiving care in our clinic.

## 2019-11-12 NOTE — TELEPHONE ENCOUNTER
Tried calling patient, no answered. Left message with Primary Care clinic number requesting patient to call back to schedule annual appointment.

## 2019-11-15 NOTE — TELEPHONE ENCOUNTER
Majo Mckinnon MD Beard, Madeline, RN   Caller: Unspecified (1 week ago,  3:48 PM)             Every 6 months for now, might change it to [id number] based on her response to next rituximab.      Left message with information above.      Desiree Godinez RN  Rheumatology Clinic

## 2019-12-05 ENCOUNTER — INFUSION THERAPY VISIT (OUTPATIENT)
Dept: INFUSION THERAPY | Facility: CLINIC | Age: 54
End: 2019-12-05
Attending: INTERNAL MEDICINE
Payer: COMMERCIAL

## 2019-12-05 VITALS
SYSTOLIC BLOOD PRESSURE: 130 MMHG | HEART RATE: 85 BPM | RESPIRATION RATE: 18 BRPM | WEIGHT: 169.97 LBS | TEMPERATURE: 98.1 F | OXYGEN SATURATION: 98 % | BODY MASS INDEX: 30.11 KG/M2 | DIASTOLIC BLOOD PRESSURE: 74 MMHG

## 2019-12-05 DIAGNOSIS — I77.82 ANCA-ASSOCIATED VASCULITIS (H): ICD-10-CM

## 2019-12-05 DIAGNOSIS — M31.30 GRANULOMATOSIS WITH POLYANGIITIS WITHOUT RENAL INVOLVEMENT (H): Primary | ICD-10-CM

## 2019-12-05 LAB
CD19 CELLS # BLD: <1 CELLS/UL (ref 107–698)
CD19 CELLS NFR BLD: <1 % (ref 6–27)
IGA SERPL-MCNC: 214 MG/DL (ref 70–380)
IGG SERPL-MCNC: 616 MG/DL (ref 695–1620)
IGM SERPL-MCNC: 33 MG/DL (ref 60–265)

## 2019-12-05 PROCEDURE — 25800030 ZZH RX IP 258 OP 636: Performed by: INTERNAL MEDICINE

## 2019-12-05 PROCEDURE — 82784 ASSAY IGA/IGD/IGG/IGM EACH: CPT | Performed by: INTERNAL MEDICINE

## 2019-12-05 PROCEDURE — 96413 CHEMO IV INFUSION 1 HR: CPT

## 2019-12-05 PROCEDURE — 25000128 H RX IP 250 OP 636: Performed by: INTERNAL MEDICINE

## 2019-12-05 PROCEDURE — 96375 TX/PRO/DX INJ NEW DRUG ADDON: CPT

## 2019-12-05 PROCEDURE — 96415 CHEMO IV INFUSION ADDL HR: CPT

## 2019-12-05 PROCEDURE — 96376 TX/PRO/DX INJ SAME DRUG ADON: CPT

## 2019-12-05 PROCEDURE — 86355 B CELLS TOTAL COUNT: CPT | Performed by: INTERNAL MEDICINE

## 2019-12-05 PROCEDURE — 96367 TX/PROPH/DG ADDL SEQ IV INF: CPT

## 2019-12-05 PROCEDURE — 25000132 ZZH RX MED GY IP 250 OP 250 PS 637: Performed by: INTERNAL MEDICINE

## 2019-12-05 RX ORDER — ACETAMINOPHEN 325 MG/1
650 TABLET ORAL ONCE
Status: CANCELLED
Start: 2019-12-19

## 2019-12-05 RX ORDER — METHYLPREDNISOLONE SODIUM SUCCINATE 125 MG/2ML
125 INJECTION, POWDER, LYOPHILIZED, FOR SOLUTION INTRAMUSCULAR; INTRAVENOUS ONCE
Status: COMPLETED | OUTPATIENT
Start: 2019-12-05 | End: 2019-12-05

## 2019-12-05 RX ORDER — METHYLPREDNISOLONE SODIUM SUCCINATE 125 MG/2ML
125 INJECTION, POWDER, LYOPHILIZED, FOR SOLUTION INTRAMUSCULAR; INTRAVENOUS ONCE
Status: CANCELLED | OUTPATIENT
Start: 2019-12-19

## 2019-12-05 RX ORDER — DIPHENHYDRAMINE HYDROCHLORIDE 50 MG/ML
25 INJECTION INTRAMUSCULAR; INTRAVENOUS ONCE
Status: COMPLETED | OUTPATIENT
Start: 2019-12-05 | End: 2019-12-05

## 2019-12-05 RX ORDER — ACETAMINOPHEN 325 MG/1
650 TABLET ORAL ONCE
Status: COMPLETED | OUTPATIENT
Start: 2019-12-05 | End: 2019-12-05

## 2019-12-05 RX ADMIN — DIPHENHYDRAMINE HYDROCHLORIDE 50 MG: 50 INJECTION INTRAMUSCULAR; INTRAVENOUS at 09:26

## 2019-12-05 RX ADMIN — ACETAMINOPHEN 650 MG: 325 TABLET, FILM COATED ORAL at 09:05

## 2019-12-05 RX ADMIN — METHYLPREDNISOLONE SODIUM SUCCINATE 125 MG: 125 INJECTION, POWDER, FOR SOLUTION INTRAMUSCULAR; INTRAVENOUS at 09:07

## 2019-12-05 RX ADMIN — RITUXIMAB 1000 MG: 10 INJECTION, SOLUTION INTRAVENOUS at 09:56

## 2019-12-05 RX ADMIN — SODIUM CHLORIDE 250 ML: 9 INJECTION, SOLUTION INTRAVENOUS at 09:26

## 2019-12-05 RX ADMIN — DIPHENHYDRAMINE HYDROCHLORIDE 25 MG: 50 INJECTION INTRAMUSCULAR; INTRAVENOUS at 14:35

## 2019-12-05 NOTE — LETTER
Patient:  Janine Cornell  :   1965  MRN:     [id number]        Ms.Lisa CHELLE Cornell  [address] [facility] [id number]        2019    Dear ,    We are writing to inform you of your test results. They are stable.      Results for orders placed or performed in visit on 19   CD19 B Cell Count     Status: None   Result Value Ref Range    CD19 B Cells <1 6 - 27 %    Absolute CD19 <1 107 - 698 cells/uL   Immunoglobulins A G and M     Status: Abnormal   Result Value Ref Range     (L) 695 - 1,620 mg/dL     70 - 380 mg/dL    IGM 33 (L) 60 - 265 mg/dL       Majo Mckinnon MD

## 2019-12-05 NOTE — PROGRESS NOTES
Infusion Nursing Note:  Janine Whitmoreey presents today for rituximab.    Patient seen by provider today: No   present during visit today: Not Applicable.    Note: N/A.    Intravenous Access:  Peripheral IV placed.    Treatment Conditions:  Biological Infusion Checklist:  ~~~ NOTE: If the patient answers yes to any of the questions below, hold the infusion and contact ordering provider or on-call provider.    1. Have you recently had an elevated temperature, fever, chills, productive cough, coughing for 3 weeks or longer or hemoptysis, abnormal vital signs, night sweats,  chest pain or have you noticed a decrease in your appetite, unexplained weight loss or fatigue? No.  Chronic night sweats  2. Do you have any open wounds or new incisions? No  3. Do you have any recent or upcoming hospitalizations, surgeries or dental procedures? No  4. Do you currently have or recently have had any signs of illness or infection or are you on any antibiotics? No  5. Have you had any new, sudden or worsening abdominal pain? No  6. Have you or anyone in your household received a live vaccination in the past 4 weeks? Please note:  No live vaccines while on biologic/chemotherapy until 6 months after the last treatment.  Patient can receive the flu vaccine (shot only) and the pneumovax.  It is optimal for the patient to get these vaccines mid cycle, but they can be given at any time as long as it is not on the day of the infusion. No  7. Have you recently been diagnosed with any new nervous system diseases (ie. Multiple sclerosis, Guillain [place], seizures, neurological changes) or cancer diagnosis? No  8. Are you on any form of radiation or chemotherapy? No  9. Are you pregnant or breast feeding or do you have plans of pregnancy in the future? No  10. Have you been having any signs of worsening depression or suicidal ideations?  (benlysta only) No  11. Have there been any other new onset medical symptoms? No        Post Infusion  Assessment:  Patient tolerated infusion poorly due to : mild itching on chest, mild chest tightness.  Skin non reddened, non raised.. O2 sats WNL.  Rituxan was paused for approximately 30 minutes and 25mg IV benadryl given. Symptoms resolved (though patient experienced vein discomfort up her arm with benadryl) and rituxan was resumed.  Blood return noted pre and post infusion.  Site patent and intact, free from redness, edema or discomfort.  No evidence of extravasations.  Access discontinued per protocol.  Biologic Infusion Post Education: Call the triage nurse at your clinic or seek medical attention if you have chills and/or temperature greater than or equal to 100.5, uncontrolled nausea/vomiting, diarrhea, constipation, dizziness, shortness of breath, chest pain, heart palpitations, weakness or any other new or concerning symptoms, questions or concerns.  You cannot have any live virus vaccines prior to or during treatment or up to 6 months post infusion.  If you have an upcoming surgery, medical procedure or dental procedure during treatment, this should be discussed with your ordering physician and your surgeon/dentist.  If you are having any concerning symptom, if you are unsure if you should get your next infusion or wish to speak to a provider before your next infusion, please call your care coordinator or triage nurse at your clinic to notify them so we can adequately serve you.       Discharge Plan:   Discharge instructions reviewed with: Patient.  Copy of AVS reviewed with patient and/or family.  Patient will return in 2 weeks for next appointment.  Patient discharged in stable condition accompanied by: self.  Departure Mode: Ambulatory.    Andra Cornejo

## 2019-12-15 ENCOUNTER — HEALTH MAINTENANCE LETTER (OUTPATIENT)
Age: 54
End: 2019-12-15

## 2019-12-19 ENCOUNTER — INFUSION THERAPY VISIT (OUTPATIENT)
Dept: INFUSION THERAPY | Facility: CLINIC | Age: 54
End: 2019-12-19
Attending: INTERNAL MEDICINE
Payer: COMMERCIAL

## 2019-12-19 VITALS
DIASTOLIC BLOOD PRESSURE: 55 MMHG | WEIGHT: 167.99 LBS | SYSTOLIC BLOOD PRESSURE: 112 MMHG | HEIGHT: 63 IN | TEMPERATURE: 98.3 F | BODY MASS INDEX: 29.77 KG/M2 | HEART RATE: 89 BPM | OXYGEN SATURATION: 99 % | RESPIRATION RATE: 18 BRPM

## 2019-12-19 DIAGNOSIS — M31.30 GRANULOMATOSIS WITH POLYANGIITIS WITHOUT RENAL INVOLVEMENT (H): Primary | ICD-10-CM

## 2019-12-19 DIAGNOSIS — I77.82 ANCA-ASSOCIATED VASCULITIS (H): ICD-10-CM

## 2019-12-19 PROCEDURE — 96413 CHEMO IV INFUSION 1 HR: CPT

## 2019-12-19 PROCEDURE — 96375 TX/PRO/DX INJ NEW DRUG ADDON: CPT

## 2019-12-19 PROCEDURE — 25000132 ZZH RX MED GY IP 250 OP 250 PS 637: Performed by: INTERNAL MEDICINE

## 2019-12-19 PROCEDURE — 25000128 H RX IP 250 OP 636: Performed by: INTERNAL MEDICINE

## 2019-12-19 PROCEDURE — 96415 CHEMO IV INFUSION ADDL HR: CPT

## 2019-12-19 PROCEDURE — 25800030 ZZH RX IP 258 OP 636: Performed by: INTERNAL MEDICINE

## 2019-12-19 RX ORDER — ACETAMINOPHEN 325 MG/1
650 TABLET ORAL ONCE
Status: COMPLETED | OUTPATIENT
Start: 2019-12-19 | End: 2019-12-19

## 2019-12-19 RX ORDER — METHYLPREDNISOLONE SODIUM SUCCINATE 125 MG/2ML
125 INJECTION, POWDER, LYOPHILIZED, FOR SOLUTION INTRAMUSCULAR; INTRAVENOUS ONCE
Status: COMPLETED | OUTPATIENT
Start: 2019-12-19 | End: 2019-12-19

## 2019-12-19 RX ORDER — METHYLPREDNISOLONE SODIUM SUCCINATE 125 MG/2ML
125 INJECTION, POWDER, LYOPHILIZED, FOR SOLUTION INTRAMUSCULAR; INTRAVENOUS ONCE
Status: CANCELLED | OUTPATIENT
Start: 2019-12-19

## 2019-12-19 RX ORDER — ACETAMINOPHEN 325 MG/1
650 TABLET ORAL ONCE
Status: CANCELLED
Start: 2019-12-19

## 2019-12-19 RX ADMIN — DIPHENHYDRAMINE HYDROCHLORIDE 50 MG: 50 INJECTION INTRAMUSCULAR; INTRAVENOUS at 09:08

## 2019-12-19 RX ADMIN — RITUXIMAB 1000 MG: 10 INJECTION, SOLUTION INTRAVENOUS at 09:55

## 2019-12-19 RX ADMIN — SODIUM CHLORIDE 250 ML: 9 INJECTION, SOLUTION INTRAVENOUS at 09:00

## 2019-12-19 RX ADMIN — METHYLPREDNISOLONE SODIUM SUCCINATE 125 MG: 125 INJECTION, POWDER, FOR SOLUTION INTRAMUSCULAR; INTRAVENOUS at 09:35

## 2019-12-19 RX ADMIN — ACETAMINOPHEN 975 MG: 325 TABLET, FILM COATED ORAL at 08:38

## 2019-12-19 ASSESSMENT — MIFFLIN-ST. JEOR: SCORE: 1336.13

## 2019-12-19 ASSESSMENT — PAIN SCALES - GENERAL: PAINLEVEL: MODERATE PAIN (5)

## 2019-12-19 NOTE — PROGRESS NOTES
Infusion Nursing Note:  Janine Cornell presents today for rituximab infusion #2.    Patient seen by provider today: No   present during visit today: Not Applicable.    Note: Has appointment to see Dr. Chahal in Februrary.  States she had fatigue and HA for several days after last infusion.    Intravenous Access:  Peripheral IV placed.    Treatment Conditions:  Biological Infusion Checklist:  ~~~ NOTE: If the patient answers yes to any of the questions below, hold the infusion and contact ordering provider or on-call provider.    1. Have you recently had an elevated temperature, fever, chills, productive cough, coughing for 3 weeks or longer or hemoptysis, abnormal vital signs, night sweats,  chest pain or have you noticed a decrease in your appetite, unexplained weight loss or fatigue? No  2. Do you have any open wounds or new incisions? No  3. Do you have any recent or upcoming hospitalizations, surgeries or dental procedures? No  4. Do you currently have or recently have had any signs of illness or infection or are you on any antibiotics? No  5. Have you had any new, sudden or worsening abdominal pain? No  6. Have you or anyone in your household received a live vaccination in the past 4 weeks? Please note:  No live vaccines while on biologic/chemotherapy until 6 months after the last treatment.  Patient can receive the flu vaccine (shot only) and the pneumovax.  It is optimal for the patient to get these vaccines mid cycle, but they can be given at any time as long as it is not on the day of the infusion. No  7. Have you recently been diagnosed with any new nervous system diseases (ie. Multiple sclerosis, Guillain [place], seizures, neurological changes) or cancer diagnosis? No  8. Are you on any form of radiation or chemotherapy? No  9. Are you pregnant or breast feeding or do you have plans of pregnancy in the future? No  10. Have you been having any signs of worsening depression or suicidal  ideations?  (benlysta only) No  11. Have there been any other new onset medical symptoms? No        Post Infusion Assessment:  Biologic Infusion Post Education: Call the triage nurse at your clinic or seek medical attention if you have chills and/or temperature greater than or equal to 100.5, uncontrolled nausea/vomiting, diarrhea, constipation, dizziness, shortness of breath, chest pain, heart palpitations, weakness or any other new or concerning symptoms, questions or concerns.  You cannot have any live virus vaccines prior to or during treatment or up to 6 months post infusion.  If you have an upcoming surgery, medical procedure or dental procedure during treatment, this should be discussed with your ordering physician and your surgeon/dentist.  If you are having any concerning symptom, if you are unsure if you should get your next infusion or wish to speak to a provider before your next infusion, please call your care coordinator or triage nurse at your clinic to notify them so we can adequately serve you.       Discharge Plan:   Discharge instructions reviewed with: Patient.  Patient discharged in stable condition accompanied by: self.  Departure Mode: Ambulatory.    Andra Cornejo

## 2020-01-06 ENCOUNTER — ALLIED HEALTH/NURSE VISIT (OUTPATIENT)
Dept: OPHTHALMOLOGY | Facility: CLINIC | Age: 55
End: 2020-01-06
Attending: OPHTHALMOLOGY
Payer: COMMERCIAL

## 2020-01-06 DIAGNOSIS — H53.10 SUBJECTIVE VISUAL DISTURBANCE: Primary | ICD-10-CM

## 2020-01-06 DIAGNOSIS — M35.9 UNDIFFERENTIATED CONNECTIVE TISSUE DISEASE (H): ICD-10-CM

## 2020-01-06 DIAGNOSIS — H53.10 SUBJECTIVE VISUAL DISTURBANCE: ICD-10-CM

## 2020-01-06 DIAGNOSIS — H53.40 VISUAL FIELD DEFECT: ICD-10-CM

## 2020-01-06 DIAGNOSIS — H05.10 IDIOPATHIC ORBITAL INFLAMMATORY SYNDROME: Primary | ICD-10-CM

## 2020-01-06 PROCEDURE — 92133 CPTRZD OPH DX IMG PST SGM ON: CPT | Mod: ZF | Performed by: OPHTHALMOLOGY

## 2020-01-06 PROCEDURE — 92083 EXTENDED VISUAL FIELD XM: CPT | Mod: ZF | Performed by: OPHTHALMOLOGY

## 2020-01-06 PROCEDURE — G0463 HOSPITAL OUTPT CLINIC VISIT: HCPCS | Mod: ZF | Performed by: TECHNICIAN/TECHNOLOGIST

## 2020-01-06 PROCEDURE — 92015 DETERMINE REFRACTIVE STATE: CPT | Mod: ZF | Performed by: TECHNICIAN/TECHNOLOGIST

## 2020-01-06 RX ORDER — TRAMADOL HYDROCHLORIDE 50 MG/1
50 TABLET ORAL EVERY 8 HOURS PRN
Qty: 60 TABLET | Refills: 0 | Status: SHIPPED | OUTPATIENT
Start: 2020-01-06 | End: 2020-03-04

## 2020-01-06 ASSESSMENT — VISUAL ACUITY
OS_CC: 20/20
CORRECTION_TYPE: GLASSES
OD_CC: 20/20
METHOD: SNELLEN - LINEAR

## 2020-01-06 ASSESSMENT — REFRACTION_WEARINGRX
OS_SPHERE: -3.75
OS_CYLINDER: +1.25
OD_SPHERE: -3.50
OD_AXIS: 140
OS_AXIS: 010
OD_CYLINDER: +1.00
SPECS_TYPE: PAL

## 2020-01-06 ASSESSMENT — SLIT LAMP EXAM - LIDS: COMMENTS: NORMAL

## 2020-01-06 ASSESSMENT — REFRACTION_MANIFEST
OD_CYLINDER: +1.00
OS_CYLINDER: +1.25
OS_SPHERE: -4.00
OS_AXIS: 008
OD_AXIS: 140
OD_SPHERE: -3.75

## 2020-01-06 ASSESSMENT — EXTERNAL EXAM - RIGHT EYE: OD_EXAM: NORMAL

## 2020-01-06 ASSESSMENT — CONF VISUAL FIELD
METHOD: COUNTING FINGERS
OS_NORMAL: 1

## 2020-01-06 ASSESSMENT — TONOMETRY
IOP_METHOD: ICARE
OS_IOP_MMHG: 21
OD_IOP_MMHG: 20

## 2020-01-06 ASSESSMENT — EXTERNAL EXAM - LEFT EYE: OS_EXAM: NORMAL

## 2020-01-06 ASSESSMENT — CUP TO DISC RATIO
OD_RATIO: 0
OS_RATIO: 0

## 2020-01-06 NOTE — Clinical Note
1/6/2020       RE: Janine Cornell  [address] [facility] [address]     Dear Colleague,    Thank you for referring your patient, Janine Cornell, to the EYE CLINIC at [facility]. Please see a copy of my visit note below.           Assessment & Plan     Janine Cornell is a 54 year old female with the following diagnoses:   1. Idiopathic orbital inflammatory syndrome    2. Subjective visual disturbance    3. Visual field defect       Follow up GPA associated idiopathic orbital inflammatory syndrome RIGHT eye.  Last visit was 6 months ago.   We had noted worsening inflammation on CT orbits since 2017 and thinning of the retinal nerve fiber layer over time.  She had rituxan in December 2018 and June 2019 and December 2019.  At her last visit, the proptosis was much better RIGHT eye and the visual acuity was stable and  The visual field was better.  We repeated CT scan on 7/11/19 and this showed no substantial change in inflammation but reduced proptosis. Today, she feels like she is about the same.       Her visual acuity is 20/20 RIGHT eye (better) and 20/20 LEFT eye (stable).  Her color vision is normal both eyes.  Her visual field shows a nasal step/arcuate (MD -2.7) RIGHT eye (stabie) and normal LEFT eye.  Her retinal nerve fiber layer is 61 RIGHT eye (stable). Her RIGHT eye is 86 (stable).  Ghassan exophthalmometry is 16/16 at 101 (better).       It is my impression that her proptosis is gone in the RIGHT eye.  Her visual acuity is better.  The visual field is stable.  I think she has had a good response to rituxan.   follow up 6 months sooner as needed for worsening symptoms.           Attending Physician Attestation:  Complete documentation of historical and exam elements from today's encounter can be found in the full encounter summary report (not reduplicated in this progress note).  I personally obtained the chief complaint(s) and history of present illness.  I  confirmed and edited as necessary the review of systems, past medical/surgical history, family history, social history, and examination findings as documented by others; and I examined the patient myself.  I personally reviewed the relevant tests, images, and reports as documented above.  I formulated and edited as necessary the assessment and plan and discussed the findings and management plan with the patient and family. - Jas Vernon MD      Again, thank you for allowing me to participate in the care of your patient.      Sincerely,    Jas Vernon MD

## 2020-01-06 NOTE — PROGRESS NOTES
Assessment & Plan     Janine Cornell is a 54 year old female with the following diagnoses:   1. Idiopathic orbital inflammatory syndrome    2. Subjective visual disturbance    3. Visual field defect       Follow up GPA associated idiopathic orbital inflammatory syndrome RIGHT eye.  Last visit was 6 months ago.  We had noted worsening inflammation on CT orbits since 2017 and thinning of the retinal nerve fiber layer over time.  She had rituxan in December 2018 and June 2019 and December 2019.  At her last visit, the proptosis was much better RIGHT eye and the visual acuity was stable and  The visual field was better.  We repeated CT scan on 7/11/19 and this showed no substantial change in inflammation but reduced proptosis. Today, she feels like she is about the same.       Her visual acuity is 20/20 RIGHT eye (better) and 20/20 LEFT eye (stable).  Her color vision is normal both eyes.  Her visual field shows a nasal step/arcuate (MD -2.7) RIGHT eye (stabie) and normal LEFT eye.  Her retinal nerve fiber layer is 61 RIGHT eye (stable). Her RIGHT eye is 86 (stable).  Ghassan exophthalmometry is 16/16 at 101 (better).       It is my impression that her proptosis is gone in the RIGHT eye.  Her visual acuity is better.  The visual field is stable.  I think she has had a good response to rituxan.  follow up 6 months sooner as needed for worsening symptoms.           Attending Physician Attestation:  Complete documentation of historical and exam elements from today's encounter can be found in the full encounter summary report (not reduplicated in this progress note).  I personally obtained the chief complaint(s) and history of present illness.  I confirmed and edited as necessary the review of systems, past medical/surgical history, family history, social history, and examination findings as documented by others; and I examined the patient myself.  I personally reviewed the relevant tests, images, and reports as  documented above.  I formulated and edited as necessary the assessment and plan and discussed the findings and management plan with the patient and family. - Jas Vernon MD

## 2020-01-06 NOTE — NURSING NOTE
"Chief Complaint(s) and History of Present Illness(es)     Follow Up     In both eyes (Visual field defect).              Comments     Patient reports subjective visual disturbances with visual changes all the time. States in the dark, left eye only see shapes and shadows and right eye see some sort of \"illuminating\"    TIFF Michael 1/6/2020 1:11 PM                "

## 2020-01-06 NOTE — LETTER
2020         RE:  :  MRN: Janine Cornell  1965  [id number]     Dear Dr. Mckinnon:     Your patient, Janine Cornell, returned for neuro-ophthalmic follow up. My assessment and plan are below.  For further details, please see my attached clinic note.      Assessment & Plan     Janine Cornell is a 54 year old female with the following diagnoses:   1. Idiopathic orbital inflammatory syndrome    2. Subjective visual disturbance    3. Visual field defect       Follow up GPA associated idiopathic orbital inflammatory syndrome RIGHT eye.  Last visit was 6 months ago. We had noted worsening inflammation on CT orbits since  and thinning of the retinal nerve fiber layer over time.  She had rituxan in 2018 and 2019 and 2019.  At her last visit, the proptosis was much better RIGHT eye and the visual acuity was stable and  The visual field was better.  We repeated CT scan on 19 and this showed no substantial change in inflammation but reduced proptosis. Today, she feels like she is about the same.       Her visual acuity is 20/20 RIGHT eye (better) and 20/20 LEFT eye (stable).  Her color vision is normal both eyes.  Her visual field shows a nasal step/arcuate (MD -2.7) RIGHT eye (stabie) and normal LEFT eye.  Her retinal nerve fiber layer is 61 RIGHT eye (stable). Her RIGHT eye is 86 (stable).  Ghassan exophthalmometry is 16/16 at 101 (better).       It is my impression that her proptosis is gone in the RIGHT eye.  Her visual acuity is better.  The visual field is stable.  I think she has had a good response to rituxan.   follow up 6 months sooner as needed for worsening symptoms.      Again, thank you for allowing me to participate in the care of your patient.      Sincerely,    Jas Vernon MD  Professor  Ophthalmology Residency   Director of Neuro-Ophthalmology  [employer] [facility] [employer] Endowed Chair  Departments of Ophthalmology, Neurology, and Neurosurgery  [facility]  [facility] [address], [place]  [address]  T - [phone]  F - [phone]  JENNY - [email or web address]      CC: Majo Mckinnon MD  [address]  [facility] [address]  VIA In Basket     Charis Holbrook MD  [address] [address]  VIA In Basket     Kash Solano MD  [address] [facility] [address]  VIA In Basket

## 2020-01-06 NOTE — TELEPHONE ENCOUNTER
Last Seen: 10/29/2019  Next Appointment: 2/21/2020  Medication: tramadol 50mg  Last Filled: 10/29/2019  Qty: #60     reviewed as follows:      Request sent to provider for review and signature.    Beulah Fortune CMA   1/6/2020 9:13 AM

## 2020-01-19 DIAGNOSIS — I10 HYPERTENSION GOAL BP (BLOOD PRESSURE) < 140/90: ICD-10-CM

## 2020-01-20 RX ORDER — SPIRONOLACTONE 50 MG/1
TABLET, FILM COATED ORAL
Qty: 90 TABLET | Refills: 2 | Status: SHIPPED | OUTPATIENT
Start: 2020-01-20 | End: 2020-02-05

## 2020-01-20 NOTE — TELEPHONE ENCOUNTER
SPIRONOLACTONE 50MG TABLETS     Last Written Prescription Date:  12/28/2018  Last Fill Quantity: 90,   # refills: 2  Last Office Visit :  7/11/2019  Future Office visit:  Nonew  90 Tabs, 2 Refills sent to pharmacy for Pt care.    Georgia Mercedes RN  Central Triage Red Flags/Med Refills

## 2020-01-31 DIAGNOSIS — I25.10 CORONARY ARTERY DISEASE INVOLVING NATIVE HEART WITHOUT ANGINA PECTORIS, UNSPECIFIED VESSEL OR LESION TYPE: ICD-10-CM

## 2020-02-02 NOTE — TELEPHONE ENCOUNTER
7/11/2019  [facility]   Milan Peace MD   Cardiology     pravastatin (PRAVACHOL) 40 MG tablet     Last Written Prescription Date:  12/28/18  Last Fill Quantity: 90,   # refills: 2  Last Office Visit :7/11/19  Future Office visit: none    Routing refill request to provider for review/approval because: gap in med rf.

## 2020-02-03 ENCOUNTER — TELEPHONE (OUTPATIENT)
Dept: CARDIOLOGY | Facility: CLINIC | Age: 55
End: 2020-02-03

## 2020-02-03 DIAGNOSIS — I10 HYPERTENSION GOAL BP (BLOOD PRESSURE) < 140/90: ICD-10-CM

## 2020-02-03 DIAGNOSIS — I25.10 CORONARY ARTERY DISEASE INVOLVING NATIVE HEART WITHOUT ANGINA PECTORIS, UNSPECIFIED VESSEL OR LESION TYPE: ICD-10-CM

## 2020-02-03 NOTE — TELEPHONE ENCOUNTER
[facility] Call Center    Phone Message    May a detailed message be left on voicemail: yes     Reason for Call: Other: pt calling because she has some questions about possibly being seen sooner. Please call the pt back as soon as possible to discuss.     Action Taken: Message routed to:  [facility] ([facility] cardiology    Travel Screening: Negative

## 2020-02-04 DIAGNOSIS — E55.9 VITAMIN D DEFICIENCY: ICD-10-CM

## 2020-02-04 RX ORDER — ERGOCALCIFEROL 1.25 MG/1
50000 CAPSULE, LIQUID FILLED ORAL
Qty: 12 CAPSULE | Refills: 0 | Status: SHIPPED | OUTPATIENT
Start: 2020-02-04 | End: 2021-01-17

## 2020-02-04 RX ORDER — PRAVASTATIN SODIUM 40 MG
40 TABLET ORAL DAILY
Qty: 90 TABLET | Refills: 1 | Status: SHIPPED | OUTPATIENT
Start: 2020-02-04 | End: 2020-02-05

## 2020-02-04 NOTE — TELEPHONE ENCOUNTER
vitamin D2 (ERGOCALCIFEROL) 41042 units (1250 mcg) capsule      Last Written Prescription Date:  10/30/19  Last Fill Quantity: 12,   # refills: 0  Last Office Visit : 10/29/19  Future Office visit:  2/21/20    Routing refill request to provider for review/approval because:  Dose exceeds protocol parameters.

## 2020-02-05 RX ORDER — PRAVASTATIN SODIUM 40 MG
40 TABLET ORAL DAILY
Qty: 30 TABLET | Refills: 5 | Status: SHIPPED | OUTPATIENT
Start: 2020-02-05 | End: 2020-07-06

## 2020-02-05 RX ORDER — SPIRONOLACTONE 50 MG/1
TABLET, FILM COATED ORAL
Qty: 30 TABLET | Refills: 6 | Status: SHIPPED | OUTPATIENT
Start: 2020-02-05 | End: 2020-06-22

## 2020-02-05 NOTE — TELEPHONE ENCOUNTER
"Called and spoke with Pt, who reported she has been having an increase in sxs since December.  She noted she has several diseases, which makes it difficult to determine what is cardiac related, and she would like to see Dr Peace.      Pt primary complaints are fatigue and bilateral arm heaviness and pain at the top of her arm.  She states the heaviness and pain are intermittent, but occur frequently.  Pt also noted that with the pain she gets swelling in her forearm.  She currently uses ice and numbing cream, which help, but doesn't resolve the pain.  Heaviness also occurs bilaterally in legs.     She reported she has has CP \"a couple times\" since December.  She has used nitroglycerin, and noted he did provide relief.  No recent episodes of CP noted; however, Pt is unsure of timeline and exact frequency.  She denied this radiated to her back or neck.     Pt also reports intermittent indigestion, but noted she is unsure if this is food related.  Pt has asthma and noted she gets SOB \"quiet a lot\".  She denied any change in SOB from her baseline.     Pt has hx of NSTEMI.  She stated she was experiencing sxs for months prior to her heart attach.  She noted at that time she was experiencing chest pressure, SOB, indigestion and pain in one arm.  She reported today feeling like her present sxs are different than those experienced at that time.    Pt offered appt with Dr Peace 02/06 at 2:30 PM, which she accepted.  Appt scheduled.    Pt verbalized understanding, agreed to current plan and denied any further questions.    Selena Underwood LPN    "

## 2020-02-06 ENCOUNTER — HOSPITAL ENCOUNTER (OUTPATIENT)
Facility: CLINIC | Age: 55
End: 2020-02-06
Attending: INTERNAL MEDICINE | Admitting: INTERNAL MEDICINE
Payer: COMMERCIAL

## 2020-02-06 ENCOUNTER — OFFICE VISIT (OUTPATIENT)
Dept: CARDIOLOGY | Facility: CLINIC | Age: 55
End: 2020-02-06
Attending: INTERNAL MEDICINE
Payer: COMMERCIAL

## 2020-02-06 VITALS
DIASTOLIC BLOOD PRESSURE: 72 MMHG | SYSTOLIC BLOOD PRESSURE: 103 MMHG | HEIGHT: 63 IN | OXYGEN SATURATION: 99 % | WEIGHT: 170 LBS | HEART RATE: 80 BPM | BODY MASS INDEX: 30.12 KG/M2

## 2020-02-06 DIAGNOSIS — I21.4 NSTEMI (NON-ST ELEVATED MYOCARDIAL INFARCTION) (H): ICD-10-CM

## 2020-02-06 DIAGNOSIS — I10 HYPERTENSION GOAL BP (BLOOD PRESSURE) < 140/90: ICD-10-CM

## 2020-02-06 DIAGNOSIS — I25.10 ASCVD (ARTERIOSCLEROTIC CARDIOVASCULAR DISEASE): ICD-10-CM

## 2020-02-06 DIAGNOSIS — I25.5 ISCHEMIC CARDIOMYOPATHY: ICD-10-CM

## 2020-02-06 DIAGNOSIS — I25.10 CAD S/P PERCUTANEOUS CORONARY ANGIOPLASTY: ICD-10-CM

## 2020-02-06 DIAGNOSIS — R07.89 CHEST DISCOMFORT: ICD-10-CM

## 2020-02-06 DIAGNOSIS — R07.89 CHEST DISCOMFORT: Primary | ICD-10-CM

## 2020-02-06 DIAGNOSIS — Z98.61 CAD S/P PERCUTANEOUS CORONARY ANGIOPLASTY: ICD-10-CM

## 2020-02-06 DIAGNOSIS — E78.5 HYPERLIPIDEMIA LDL GOAL <70: ICD-10-CM

## 2020-02-06 DIAGNOSIS — E11.9 TYPE 2 DIABETES, HBA1C GOAL < 8% (H): ICD-10-CM

## 2020-02-06 LAB — INTERPRETATION ECG - MUSE: NORMAL

## 2020-02-06 PROCEDURE — 99214 OFFICE O/P EST MOD 30 MIN: CPT | Mod: 25 | Performed by: INTERNAL MEDICINE

## 2020-02-06 PROCEDURE — 93005 ELECTROCARDIOGRAM TRACING: CPT | Mod: ZF

## 2020-02-06 PROCEDURE — G0463 HOSPITAL OUTPT CLINIC VISIT: HCPCS | Mod: 25,ZF

## 2020-02-06 RX ORDER — POTASSIUM CHLORIDE 1500 MG/1
20 TABLET, EXTENDED RELEASE ORAL
Status: CANCELLED | OUTPATIENT
Start: 2020-02-06

## 2020-02-06 RX ORDER — LIDOCAINE 40 MG/G
CREAM TOPICAL
Status: CANCELLED | OUTPATIENT
Start: 2020-02-06

## 2020-02-06 RX ORDER — SODIUM CHLORIDE 9 MG/ML
INJECTION, SOLUTION INTRAVENOUS CONTINUOUS
Status: CANCELLED | OUTPATIENT
Start: 2020-02-06

## 2020-02-06 RX ORDER — POTASSIUM CHLORIDE 1500 MG/1
40 TABLET, EXTENDED RELEASE ORAL
Status: CANCELLED | OUTPATIENT
Start: 2020-02-06

## 2020-02-06 RX ORDER — NITROGLYCERIN 0.4 MG/1
0.4 TABLET SUBLINGUAL EVERY 5 MIN PRN
Qty: 25 TABLET | Refills: 3 | Status: SHIPPED | OUTPATIENT
Start: 2020-02-06 | End: 2020-07-06

## 2020-02-06 ASSESSMENT — PAIN SCALES - GENERAL: PAINLEVEL: MODERATE PAIN (4)

## 2020-02-06 ASSESSMENT — MIFFLIN-ST. JEOR: SCORE: 1345.24

## 2020-02-06 NOTE — PROGRESS NOTES
HPI:     Ms Janine Cornell, who is a 54 yr -American patient with DM2, Hypothyroidism, PCOS, Dyslipidemia, HTN, and CAD S/P NSTEMI s/p KATHERINE to the mLAD in 2011 and RCA in 2016 comes for follow-up and chest discomfort.      Patient has a positive RA factor and fibromyalgia (see recent visit rheumatology)    Patient reports that she had chest discomfort at rest this morning radiating the left arm that was relieved with nitroglycerin x2. She states took nitroglycerin most recently 1 month ago. She also reports dyspnea on exertion and diaphoresis.     She has many chronic symptoms that appear to be stable at this time.    PAST MEDICAL HISTORY:  Past Medical History:   Diagnosis Date     Abnormal glandular Papanicolaou smear of cervix 1992     Allergic rhinitis     Allergy, airborne subst     Arthritis      ASCVD (arteriosclerotic cardiovascular disease)      Chronic pain      Chronic pancreatitis (H)     idiopathic, Tx: PPI, antioxidants, pancreatic enzymes     Common migraine      Coronary artery disease      Costochondritis      Difficulty in walking(719.7)      Dyspnea on exertion      Ectasia, mammary duct     [facility], persistent nipple discharge     Elevated fasting glucose      Gastro-oesophageal reflux disease      Hernia      History of angina      Hyperlipidemia LDL goal < 100      Hypertension goal BP (blood pressure) < 140/90     Essential hypertension     Iron deficiency anemia      Ischemic cardiomyopathy      Menorrhagia      Migraine headaches      Mild persistent asthma      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 Matias, neg w/u, Rx'd w/prednisone     Ventral hernia, unspecified, without mention of obstruction or gangrene 7/2012       CURRENT MEDICATIONS:  Current Outpatient Medications   Medication Sig Dispense Refill     acetaminophen (TYLENOL) 325 MG tablet Take 1-2 tablets (325-650 mg) by mouth every 6 hours as needed for pain (headache) 250 tablet 0     albuterol (2.5 MG/3ML) 0.083% neb solution INL 1 VIAL VIA NEBULIZATION Q 4 TO 6 HOURS PRN  1     albuterol (PROAIR HFA, PROVENTIL HFA, VENTOLIN HFA) 108 (90 BASE) MCG/ACT inhaler Inhale 2 puffs into the lungs every 6 hours as needed for shortness of breath / dyspnea or wheezing 3 Inhaler 1     aspirin (ASPIRIN LOW DOSE) 81 MG EC tablet Take 1 tablet (81 mg) by mouth daily 30 tablet 11     blood glucose monitoring (NO BRAND SPECIFIED) meter device kit Use to test blood sugar 4 X times daily or as directed. (Patient taking differently: 1 kit Use to test blood sugar 4 X times daily or as directed.) 1 kit 0     blood glucose monitoring (NO BRAND SPECIFIED) test strip 1 strip by In Vitro route 4 times daily Test as directed. Please dispense three months and three months of refills. Thank you. (Patient taking differently: 1 strip by In Vitro route 4 times daily Test as directed. Please dispense three months and three months of refills. Thank you.) 360 each 3     Blood Pressure Monitor KIT 1 each daily Monitor home blood pressure as instructed by physician.  Dispense [place] blood pressure kit. 1 kit 0     calcium carbonate (TUMS) 500 MG chewable tablet Take 3-4 chew tab by mouth daily as needed.       clopidogrel (PLAVIX) 75 MG tablet Take 1 tablet (75 mg) by mouth daily 30 tablet 11      COMPRESSION STOCKINGS 2 each daily Apply one 10-15 mmHg compression stocking to each lower extgmierty during the day and remove before bedtime. 4 each 2     cyclobenzaprine (FLEXERIL) 10 MG tablet Take 1 tablet (10 mg) by mouth 2 times daily as needed for muscle spasms 60 tablet 2     cycloSPORINE (RESTASIS) 0.05 % ophthalmic emulsion Place 1 drop into both eyes 2 times daily 60 each 11     cycloSPORINE (RESTASIS) 0.05 % ophthalmic emulsion Place 1 drop into the right eye every 12 hours       desonide (DESOWEN) 0.05 % cream Apply topically 2 times daily       diclofenac (VOLTAREN) 1 % topical gel Apply 2 g topically 4 times daily Apply to affected joints 100 g 3     dicyclomine (BENTYL) 20 MG tablet TAKE 1 TABLET(20 MG) BY MOUTH FOUR TIMES DAILY AS NEEDED. Pls schedule clinic appt for refills. 60 tablet 0     diphenhydrAMINE (BENADRYL) 25 MG capsule TK 1 TO 2 CS PO QHS  4     EPIPEN 2-RIKY 0.3 MG/0.3ML injection INJECT 0.3 MG INTO THE MUSCLE PRF ANAPHYALAXIS  0     fexofenadine (ALLEGRA) 180 MG tablet Take 1 tablet by mouth daily as needed. 90 tablet 3     fluocinolone (SYNALAR) 0.01 % solution Apply topically daily as needed       fluticasone-vilanterol (BREO ELLIPTA) 100-25 MCG/INH inhaler Inhale 1 puff into the lungs daily       hydroxychloroquine (PLAQUENIL) 200 MG tablet Take 2 tablets (400 mg) by mouth daily 180 tablet 1     insulin pen needle (BD MARCK U/F) 32G X 4 MM USE DAILY OR AS DIRECTED 300 each 3     ketoconazole (NIZORAL) 2 % shampoo Apply topically daily as needed       Ketoprofen POWD 1 g 2 times daily as needed (prn pain) 500 g 3     lidocaine (LMX4) 4 % external cream Apply topically once as needed for mild pain (prn pain) 30 g 3     lifitegrast (XIIDRA) 5 % opthalmic solution Place 1 drop into both eyes 2 times daily 20 each 3     lisinopril-hydrochlorothiazide (PRINZIDE/ZESTORETIC) 20-25 MG tablet Take 1 tablet by mouth daily 30 tablet 11     metFORMIN (GLUCOPHAGE-XR) 500 MG 24 hr tablet TAKE 2  TABLETS(1000 MG) BY MOUTH DAILY WITH DINNER (Patient taking differently: Take 2,000 mg by mouth daily ) 180 tablet 0     metoclopramide (REGLAN) 10 MG tablet Take 1 tablet (10 mg) by mouth 4 times daily (before meals and nightly) 90 tablet 0     metoprolol succinate ER (TOPROL-XL) 100 MG 24 hr tablet Take 1 tablet (100 mg) by mouth daily 30 tablet 11     metroNIDAZOLE (NORITATE) 1 % cream Apply topically daily       montelukast (SINGULAIR) 10 MG tablet Take 1 tablet (10 mg) by mouth At Bedtime 30 tablet 1     Multiple Vitamin (DAILY-GERALD) TABS Take 1 tablet by mouth daily  0     nitroGLYcerin (NITROSTAT) 0.4 MG sublingual tablet Place 1 tablet (0.4 mg) under the tongue every 5 minutes as needed for chest pain 25 tablet 1     nystatin (MYCOSTATIN) 946769 UNITS TABS tablet TK 2 TS PO BID  0     ondansetron (ZOFRAN-ODT) 8 MG ODT tab Take 1 tablet (8 mg) by mouth every 8 hours as needed for nausea 60 tablet 1     pantoprazole (PROTONIX) 40 MG EC tablet Take 1 tablet (40 mg) by mouth daily Pork free tablets. (Patient taking differently: Take 40 mg by mouth as needed Pork free tablets.) 30 tablet 1     pravastatin (PRAVACHOL) 40 MG tablet Take 1 tablet (40 mg) by mouth daily 30 tablet 5     spironolactone (ALDACTONE) 50 MG tablet TAKE 1 TABLET BY MOUTH EVERY DAY TAKE ADDITIONAL 1/2 TABLET BY MOUTH EVERY DAY AS NEEDED FOR WEIGHT GAIN 30 tablet 6     sucralfate (CARAFATE) 1 GM tablet Take 1 tablet (1 g) by mouth 4 times daily 360 tablet 0     traMADol (ULTRAM) 50 MG tablet Take 1 tablet (50 mg) by mouth every 8 hours as needed for moderate pain 60 tablet 0     Triamcinolone Acetonide (NASACORT ALLERGY 24HR NA)        vitamin D2 (ERGOCALCIFEROL) 31402 units (1250 mcg) capsule Take 1 capsule (50,000 Units) by mouth every 7 days 12 capsule 0     ferrous gluconate (FERGON) 324 (38 Fe) MG tablet Take 1 tablet (324 mg) by mouth 2 times daily (with meals) (Patient not taking: Reported on 10/29/2019) 180 tablet 3     folic acid  (FOLVITE) 1 MG tablet Take 1 tablet by mouth daily (Patient not taking: Reported on 10/29/2019) 90 tablet 3     Magnesium Oxide -Mg Supplement 250 MG TABS TK 1 T PO BID. REDUCE IF STOOLS LOOSEN  11     ranitidine (ZANTAC) 150 MG tablet Take 1 tablet (150 mg) by mouth 2 times daily (Patient not taking: Reported on 10/29/2019) 180 tablet 1       PAST SURGICAL HISTORY:  Past Surgical History:   Procedure Laterality Date     C ECHO HEART XTHORACIC,COMPLETE, W/O DOPPLER  04    [facility] WNL, EF 60%     C/SECTION, LOW TRANSVERSE           CARDIAC SURGERY      cardiac stent- recent in 16; 4 other stents     DILATION AND CURETTAGE N/A 2016    Procedure: DILATION AND CURETTAGE;  Surgeon: Nahed Butler MD;  Location: [facility]     HC UGI ENDOSCOPY W EUS  08    Dr. Pastrana, possible chronic pancreatitis, fatty liver     HERNIA REPAIR  2012    done at [facility] [place]     INSERT INTRAUTERINE DEVICE N/A 2016    Procedure: INSERT INTRAUTERINE DEVICE;  Surgeon: Nahed Butler MD;  Location: [facility]     INT UTERINE FIBRIOD EMBOLIZATION  10/29/2014     LAPAROSCOPIC CHOLECYSTECTOMY  08    Dr. Arnol GRUBBS TX, CERVICAL      s/p LEEP     ORBITOTOMY Right 3/15/2016    Procedure: ORBITOTOMY;  Surgeon: Myron Cyr MD;  Location: [facility]     ORBITOTOMY Right 2017    Procedure: ORBITOTOMY;  RIGHT ORBITOTOMY AND BIOPSY;  Surgeon: Charis Holbrook MD;  Location: [facility]     REPAIR PTOSIS Right 2017    Procedure: REPAIR PTOSIS;  RIGHT UPPER LID PTOSIS REPAIR;  Surgeon: Myron Cyr MD;  Location: [facility]     UPPER GI ENDOSCOPY  10/21/08    mild gastritis, Dr. Hidalgo       ALLERGIES     Allergies   Allergen Reactions     Amoxicillin-Pot Clavulanate      Augmentin      Unknown possible hives and edema     Azithromycin      Diatrizoate Other (See Comments)     Pt wants this listed because she is allergic to shellfish      Imitrex [Sumatriptan]      Severe face/neck/chest tightness  and flushing side effects      Penicillins Hives     Unknown      Pork Allergy      Stomach pain, cramping, diarrhea, itching, nausea and headaches     Shellfish Allergy Hives and Swelling     Sulfa Drugs Hives and Swelling     Zithromax [Azithromycin Dihydrate] Swelling     Unknown        FAMILY HISTORY:  Family History   Problem Relation Age of Onset     Heart Disease Father 50        heart attack     Cerebrovascular Disease Father      Diabetes Father      Hypertension Father      Depression Father      C.A.D. Father      Hypertension Mother      Arthritis Mother      Heart Disease Mother         long qt syndrome     Thyroid Disease Mother      C.A.D. Mother      Heart Disease Brother 15        MI at 15, 16.      Diabetes Maternal Uncle      Hypertension Maternal Aunt      Hypertension Maternal Uncle      Arthritis Brother         he passed away, had severe arthritis at age 11     Arthritis Maternal Uncle      Eye Disorder Maternal Uncle         cataracts     Neurologic Disorder Sister         migraines     Neurologic Disorder Sister         migraines     Respiratory Son         asthma     Cerebrovascular Disease Maternal Uncle      C.A.D. Brother      Family History Negative Other         neg for RA, SLE     Unknown/Adopted No family hx of         unknown neurological issues in her family, mother was adopted     Skin Cancer No family hx of         No known family hx of skin cancer       SOCIAL HISTORY:  Social History     Socioeconomic History     Marital status: Single     Spouse name: None     Number of children: 1     Years of education: None     Highest education level: None   Occupational History     Employer: NONE    Social Needs     Financial resource strain: None     Food insecurity:     Worry: None     Inability: None     Transportation needs:     Medical: None     Non-medical: None   Tobacco Use     Smoking status: Current Every Day Smoker     Packs/day: 0.20     Years: 1.00     Pack years:  0.20     Types: Cigarettes     Last attempt to quit: 2/1/2016     Years since quitting: 3.4     Smokeless tobacco: Never Used   Substance and Sexual Activity     Alcohol use: No     Alcohol/week: 0.0 oz     Drug use: No     Sexual activity: Not Currently   Lifestyle     Physical activity:     Days per week: None     Minutes per session: None     Stress: None   Relationships     Social connections:     Talks on phone: None     Gets together: None     Attends Congregational service: None     Active member of club or organization: None     Attends meetings of clubs or organizations: None     Relationship status: None     Intimate partner violence:     Fear of current or ex partner: None     Emotionally abused: None     Physically abused: None     Forced sexual activity: None   Other Topics Concern     Parent/sibling w/ CABG, MI or angioplasty before 65F 55M? Yes   Social History Narrative    Balanced Diet - sometimes    Osteoporosis Prevention Measures - Dairy servings per day: 2 servings weekly    Regular Exercise -  Yes Describe walking 4 times a week    Dental Exam - NO    Seatbelts used - Yes    Self Breast Exam - Yes    Abuse: Current or Past (Physical, Sexual or Emotional)- No    Do you have any concerns about STD's -  No    Do you feel safe in your environment - No    Guns stored in the home - No    Sunscreen used - Yes    Lipids -  YES - Date: 053102    Glucose -  YES - Date: 012804    Eye Exam - YES - Date: one year ago    Colon Cancer Screening - No    Hemoccults - NO    Pap Test -  YES - Date: 070904, remote history of LEEP    Mammography - YES - Date: last spring, would like to discuss, needs a referral to [facility]    DEXA - NO    Immunizations reviewed and up to date - Yes, last td given in 1997 or 1998       ROS:   Constitutional: No fever, chills, or sweats. Intermittent weight gain/loss   ENT: No visual disturbance, ear ache, epistaxis, sore throat  Allergies/Immunologic: Negative.  "  Respiratory: No cough, hemoptysia  Cardiovascular: As per HPI  GI: No nausea, vomiting, hematemesis, melena, or hematochezia  : No urinary frequency, dysuria, or hematuria  Integument: Negative  Psychiatric: Negative  Neuro: Negative  Endocrinology: Negative   Musculoskeletal: Negative    EXAM:  /72 (BP Location: Left arm, Patient Position: Chair, Cuff Size: Adult Regular)   Pulse 80   Ht 1.6 m (5' 3\")   Wt 77.1 kg (170 lb)   SpO2 99%   BMI 30.11 kg/m    In general, the patient is a pleasant female in no apparent distress.    HEENT: NC/AT.  PERRLA.  EOMI.  Sclerae white, not injected.  Nares clear.  Pharynx without erythema or exudate.  Dentition intact.    Neck: No adenopathy.  No thyromegaly. Carotids +4/4 bilaterally without bruits.  No jugular venous distension.   Heart: RRR. Normal S1, S2 splits physiologically. No murmur, rub, click, or gallop. The PMI is in the 5th ICS in the midclavicular line. There is no heave.    Lungs: CTA.  No ronchi, wheezes, rales.  No dullness to percussion.   Abdomen: Soft, nontender, nondistended. No organomegaly.  No bruits.   Extremities: No clubbing, cyanosis, or edema.  The pulses are +4/4 at the radial, brachial, femoral, popliteal, DP, and PT sites bilaterally.  No bruits are noted.  Neurologic: Alert and oriented to person/place/time, normal speech, gait and affect  Skin: No petechiae, purpura or rash.      BP at the end of visit: 128/86     Labs:  LIPID RESULTS:  Lab Results   Component Value Date    CHOL 132 07/11/2019    HDL 40 (L) 07/11/2019    LDL 62 07/11/2019    TRIG 149 07/11/2019    CHOLHDLRATIO 3.5 07/29/2015    NHDL 92 07/11/2019       LIVER ENZYME RESULTS:  Lab Results   Component Value Date    AST 17 10/29/2019    ALT 26 10/29/2019       CBC RESULTS:  Lab Results   Component Value Date    WBC 11.6 (H) 10/29/2019    RBC 4.73 10/29/2019    HGB 12.3 10/29/2019    HCT 39.1 10/29/2019    MCV 83 10/29/2019    MCH 26.0 (L) 10/29/2019    MCHC 31.5 " 10/29/2019    RDW 13.8 10/29/2019     10/29/2019       BMP RESULTS:  Lab Results   Component Value Date     07/11/2019    POTASSIUM 3.9 07/11/2019    CHLORIDE 102 07/11/2019    CO2 26 07/11/2019    ANIONGAP 6 07/11/2019     (H) 07/11/2019    BUN 22 07/11/2019    CR 1.04 10/29/2019    GFRESTIMATED 61 10/29/2019    GFRESTBLACK 71 10/29/2019    KATHY 9.3 07/11/2019        A1C RESULTS:  Lab Results   Component Value Date    A1C 9.2 (H) 03/06/2019       INR RESULTS:  Lab Results   Component Value Date    INR 0.97 08/25/2016    INR 0.98 05/20/2015       Procedures:   Echocardiogram 07-11-19  Global and regional left ventricular function is normal with an EF of 55-60%.  Mild LVH.  Left ventricular diastolic function is indeterminate.  Right ventricular function, chamber size, wall motion, and thickness are  normal.  No significant valve pathology.  Normal estimated right atrial pressure.  Cannot estimate PA pressure.  No pericardial effusion.     There has been no change.  This study was compared with the study from 7/18/2018  _____________________________________________________________________________  __        Left Ventricle  Global and regional left ventricular function is normal with an EF of 55-60%.  Mild concentric wall thickening consistent with left ventricular hypertrophy  is present. Left ventricular diastolic function is indeterminate. No regional  wall motion abnormalities are seen.     Right Ventricle  Right ventricular function, chamber size, wall motion, and thickness are  normal.     Atria  The right atria appears normal. Mild left atrial enlargement is present.     Mitral Valve  Mitral leaflet thickness is normal . Trace mitral insufficiency is present.        Aortic Valve  Aortic valve is normal in structure and function. No significant dysfunction.  Not visualized well.     Tricuspid Valve  The tricuspid valve is normal. Trace tricuspid insufficiency is present. The  peak velocity of  the tricuspid regurgitant jet is not obtainable. Pulmonary  artery systolic pressure cannot be assessed.     Pulmonic Valve  The pulmonic valve is normal. Trace pulmonic insufficiency is present.     Vessels  The aorta root is normal. The thoracic aorta is normal. The inferior vena cava  is normal. IVC diameter <2.1 cm collapsing >50% with sniff suggests a normal  RA pressure of 3 mmHg.     Pericardium  Prominent epicardial fat is noted. No pericardial effusion is present.        Compared to Previous Study  There has been no change. This study was compared with the study from  2018 .  _____________________________________________________________________________  __  MMode/2D Measurements & Calculations  IVSd: 1.2 cm     LVIDd: 4.3 cm  LVIDs: 3.0 cm  LVPWd: 1.1 cm  FS: 29.3 %  LV mass(C)d: 170.1 grams  LV mass(C)dI: 96.3 grams/m2  Ao root diam: 2.8 cm  asc Aorta Diam: 2.8 cm  LVOT diam: 2.0 cm  LVOT area: 3.2 cm2  RWT: 0.51        Doppler Measurements & Calculations  MV E max ash: 66.0 cm/sec  MV A max ash: 85.9 cm/sec  MV E/A: 0.77  MV dec slope: 288.0 cm/sec2  MV dec time: 0.23 sec  E/E' av.8  Lateral E/e': 7.7  Medial E/e': 11.9          Assessment and Plan:   54 yr -American patient with DM2, Hypothyroidism, PCOS, Dyslipidemia, HTN, and CAD S/P NSTEMI s/p KATHERINE to the mLAD in  and RCA in  comes for follow-up and chest discomfort relieved with nitroglycerin    #CAD s/p KATHERINE to mLAD and RCA  #Angina  Given patient's strong history of CAD and typical symptoms she has a high likelihood of progressive CAD.    - EKG in clinic:sin rythm        - will schedule coronary angiogram (planned 2020)  - continue with same medical regimen        I evaluated  and examined patient with CV fellow. I disucssed results and future plan with patient. I agree with CV fellow's note to make it a more cohesive document.    Milan Peace MD, PhD  Professor of Medicine  Division of Cardiology      CC  Patient Care Team:  Kash Solano MD as PCP - General (Family Practice)  Milan Peace MD as MD (Cardiology)  Majo Mckinnon MD as MD (Rheumatology)  Jas Vernon MD as MD (Ophthalmology)  Jas Vernon MD as Referring Physician (Ophthalmology)  Charis Holbrook MD as Resident (Ophthalmology)  Katia Pastor APRN CNP as Assigned PCP  Sangeetha Vasquez MD as MD (Otolaryngology)  Selena Underwood LPN as Nurse Coordinator (Cardiology)  DARLING THOMPSON

## 2020-02-06 NOTE — LETTER
2/6/2020      RE: Janine Cornell  [address] [facility] [id number]       Dear Colleague,    Thank you for the opportunity to participate in the care of your patient, Janine Cornell, at the [facility] HEART Formerly [facility] at [facility]. Please see a copy of my visit note below.    HPI:     Ms Janine Cornell, who is a 54 yr -American patient with DM2, Hypothyroidism, PCOS, Dyslipidemia, HTN, and CAD S/P NSTEMI s/p KATHERINE to the mLAD in 2011 and RCA in 2016 comes for follow-up and chest discomfort.      Patient has a positive RA factor and fibromyalgia (see recent visit rheumatology)    Patient reports that she had chest discomfort at rest this morning radiating the left arm that was relieved with nitroglycerin x2. She states took nitroglycerin most recently 1 month ago. She also reports dyspnea on exertion and diaphoresis.     She has many chronic symptoms that appear to be stable at this time.    PAST MEDICAL HISTORY:  Past Medical History:   Diagnosis Date     Abnormal glandular Papanicolaou smear of cervix 1992     Allergic rhinitis     Allergy, airborne subst     Arthritis      ASCVD (arteriosclerotic cardiovascular disease)      Chronic pain      Chronic pancreatitis (H)     idiopathic, Tx: PPI, antioxidants, pancreatic enzymes     Common migraine      Coronary artery disease      Costochondritis      Difficulty in walking(719.7)      Dyspnea on exertion      Ectasia, mammary duct     [facility], persistent nipple discharge     Elevated fasting glucose      Gastro-oesophageal reflux disease      Hernia      History of angina      Hyperlipidemia LDL goal < 100      Hypertension goal BP (blood pressure) < 140/90     Essential hypertension     Iron deficiency anemia      Ischemic cardiomyopathy      Menorrhagia      Migraine headaches      Mild persistent asthma      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 Matias, neg w/u, Rx'd w/prednisone     Ventral hernia, unspecified, without mention of obstruction or gangrene 7/2012       CURRENT MEDICATIONS:  Current Outpatient Medications   Medication Sig Dispense Refill     acetaminophen (TYLENOL) 325 MG tablet Take 1-2 tablets (325-650 mg) by mouth every 6 hours as needed for pain (headache) 250 tablet 0     albuterol (2.5 MG/3ML) 0.083% neb solution INL 1 VIAL VIA NEBULIZATION Q 4 TO 6 HOURS PRN  1     albuterol (PROAIR HFA, PROVENTIL HFA, VENTOLIN HFA) 108 (90 BASE) MCG/ACT inhaler Inhale 2 puffs into the lungs every 6 hours as needed for shortness of breath / dyspnea or wheezing 3 Inhaler 1     aspirin (ASPIRIN LOW DOSE) 81 MG EC tablet Take 1 tablet (81 mg) by mouth daily 30 tablet 11     blood glucose monitoring (NO BRAND SPECIFIED) meter device kit Use to test blood sugar 4 X times daily or as directed. (Patient taking differently: 1 kit Use to test blood sugar 4 X times daily or as directed.) 1 kit 0     blood glucose monitoring (NO BRAND SPECIFIED) test strip 1 strip by In Vitro route 4 times daily Test as directed. Please dispense three months and three months of refills. Thank you. (Patient taking differently: 1 strip by In Vitro route 4 times daily Test as directed. Please dispense three months and three months of refills. Thank you.) 360 each 3     Blood Pressure  Monitor KIT 1 each daily Monitor home blood pressure as instructed by physician.  Dispense [place] blood pressure kit. 1 kit 0     calcium carbonate (TUMS) 500 MG chewable tablet Take 3-4 chew tab by mouth daily as needed.       clopidogrel (PLAVIX) 75 MG tablet Take 1 tablet (75 mg) by mouth daily 30 tablet 11     COMPRESSION STOCKINGS 2 each daily Apply one 10-15 mmHg compression stocking to each lower extgmierty during the day and remove before bedtime. 4 each 2     cyclobenzaprine (FLEXERIL) 10 MG tablet Take 1 tablet (10 mg) by mouth 2 times daily as needed for muscle spasms 60 tablet 2     cycloSPORINE (RESTASIS) 0.05 % ophthalmic emulsion Place 1 drop into both eyes 2 times daily 60 each 11     cycloSPORINE (RESTASIS) 0.05 % ophthalmic emulsion Place 1 drop into the right eye every 12 hours       desonide (DESOWEN) 0.05 % cream Apply topically 2 times daily       diclofenac (VOLTAREN) 1 % topical gel Apply 2 g topically 4 times daily Apply to affected joints 100 g 3     dicyclomine (BENTYL) 20 MG tablet TAKE 1 TABLET(20 MG) BY MOUTH FOUR TIMES DAILY AS NEEDED. Pls schedule clinic appt for refills. 60 tablet 0     diphenhydrAMINE (BENADRYL) 25 MG capsule TK 1 TO 2 CS PO QHS  4     EPIPEN 2-RIKY 0.3 MG/0.3ML injection INJECT 0.3 MG INTO THE MUSCLE PRF ANAPHYALAXIS  0     fexofenadine (ALLEGRA) 180 MG tablet Take 1 tablet by mouth daily as needed. 90 tablet 3     fluocinolone (SYNALAR) 0.01 % solution Apply topically daily as needed       fluticasone-vilanterol (BREO ELLIPTA) 100-25 MCG/INH inhaler Inhale 1 puff into the lungs daily       hydroxychloroquine (PLAQUENIL) 200 MG tablet Take 2 tablets (400 mg) by mouth daily 180 tablet 1     insulin pen needle (BD MARCK U/F) 32G X 4 MM USE DAILY OR AS DIRECTED 300 each 3     ketoconazole (NIZORAL) 2 % shampoo Apply topically daily as needed       Ketoprofen POWD 1 g 2 times daily as needed (prn pain) 500 g 3     lidocaine (LMX4) 4 % external cream Apply topically once  as needed for mild pain (prn pain) 30 g 3     lifitegrast (XIIDRA) 5 % opthalmic solution Place 1 drop into both eyes 2 times daily 20 each 3     lisinopril-hydrochlorothiazide (PRINZIDE/ZESTORETIC) 20-25 MG tablet Take 1 tablet by mouth daily 30 tablet 11     metFORMIN (GLUCOPHAGE-XR) 500 MG 24 hr tablet TAKE 2 TABLETS(1000 MG) BY MOUTH DAILY WITH DINNER (Patient taking differently: Take 2,000 mg by mouth daily ) 180 tablet 0     metoclopramide (REGLAN) 10 MG tablet Take 1 tablet (10 mg) by mouth 4 times daily (before meals and nightly) 90 tablet 0     metoprolol succinate ER (TOPROL-XL) 100 MG 24 hr tablet Take 1 tablet (100 mg) by mouth daily 30 tablet 11     metroNIDAZOLE (NORITATE) 1 % cream Apply topically daily       montelukast (SINGULAIR) 10 MG tablet Take 1 tablet (10 mg) by mouth At Bedtime 30 tablet 1     Multiple Vitamin (DAILY-GERALD) TABS Take 1 tablet by mouth daily  0     nitroGLYcerin (NITROSTAT) 0.4 MG sublingual tablet Place 1 tablet (0.4 mg) under the tongue every 5 minutes as needed for chest pain 25 tablet 1     nystatin (MYCOSTATIN) 620791 UNITS TABS tablet TK 2 TS PO BID  0     ondansetron (ZOFRAN-ODT) 8 MG ODT tab Take 1 tablet (8 mg) by mouth every 8 hours as needed for nausea 60 tablet 1     pantoprazole (PROTONIX) 40 MG EC tablet Take 1 tablet (40 mg) by mouth daily Pork free tablets. (Patient taking differently: Take 40 mg by mouth as needed Pork free tablets.) 30 tablet 1     pravastatin (PRAVACHOL) 40 MG tablet Take 1 tablet (40 mg) by mouth daily 30 tablet 5     spironolactone (ALDACTONE) 50 MG tablet TAKE 1 TABLET BY MOUTH EVERY DAY TAKE ADDITIONAL 1/2 TABLET BY MOUTH EVERY DAY AS NEEDED FOR WEIGHT GAIN 30 tablet 6     sucralfate (CARAFATE) 1 GM tablet Take 1 tablet (1 g) by mouth 4 times daily 360 tablet 0     traMADol (ULTRAM) 50 MG tablet Take 1 tablet (50 mg) by mouth every 8 hours as needed for moderate pain 60 tablet 0     Triamcinolone Acetonide (NASACORT ALLERGY 24HR NA)         vitamin D2 (ERGOCALCIFEROL) 96166 units (1250 mcg) capsule Take 1 capsule (50,000 Units) by mouth every 7 days 12 capsule 0     ferrous gluconate (FERGON) 324 (38 Fe) MG tablet Take 1 tablet (324 mg) by mouth 2 times daily (with meals) (Patient not taking: Reported on 10/29/2019) 180 tablet 3     folic acid (FOLVITE) 1 MG tablet Take 1 tablet by mouth daily (Patient not taking: Reported on 10/29/2019) 90 tablet 3     Magnesium Oxide -Mg Supplement 250 MG TABS TK 1 T PO BID. REDUCE IF STOOLS LOOSEN  11     ranitidine (ZANTAC) 150 MG tablet Take 1 tablet (150 mg) by mouth 2 times daily (Patient not taking: Reported on 10/29/2019) 180 tablet 1       PAST SURGICAL HISTORY:  Past Surgical History:   Procedure Laterality Date     C ECHO HEART XTHORACIC,COMPLETE, W/O DOPPLER  04    [facility] WNL, EF 60%     C/SECTION, LOW TRANSVERSE           CARDIAC SURGERY      cardiac stent- recent in 16; 4 other stents     DILATION AND CURETTAGE N/A 2016    Procedure: DILATION AND CURETTAGE;  Surgeon: Nahed Butler MD;  Location: [facility] OR     HC UGI ENDOSCOPY W EUS  08    Dr. Pastrana, possible chronic pancreatitis, fatty liver     HERNIA REPAIR  2012    done at [facility]     INSERT INTRAUTERINE DEVICE N/A 2016    Procedure: INSERT INTRAUTERINE DEVICE;  Surgeon: Nahed Butler MD;  Location: [facility] OR     INT UTERINE FIBRIOD EMBOLIZATION  10/29/2014     LAPAROSCOPIC CHOLECYSTECTOMY  08    Dr. Arnol GRUBBS TX, CERVICAL      s/p LEEP     ORBITOTOMY Right 3/15/2016    Procedure: ORBITOTOMY;  Surgeon: Myron Cyr MD;  Location: [facility]     ORBITOTOMY Right 2017    Procedure: ORBITOTOMY;  RIGHT ORBITOTOMY AND BIOPSY;  Surgeon: Charis Holbrook MD;  Location: [facility]     REPAIR PTOSIS Right 2017    Procedure: REPAIR PTOSIS;  RIGHT UPPER LID PTOSIS REPAIR;  Surgeon: Myron Cyr MD;  Location: [facility]     UPPER GI ENDOSCOPY  10/21/08    mild gastritis, Dr. Hidalgo        ALLERGIES     Allergies   Allergen Reactions     Amoxicillin-Pot Clavulanate      Augmentin      Unknown possible hives and edema     Azithromycin      Diatrizoate Other (See Comments)     Pt wants this listed because she is allergic to shellfish      Imitrex [Sumatriptan]      Severe face/neck/chest tightness and flushing side effects      Penicillins Hives     Unknown      Pork Allergy      Stomach pain, cramping, diarrhea, itching, nausea and headaches     Shellfish Allergy Hives and Swelling     Sulfa Drugs Hives and Swelling     Zithromax [Azithromycin Dihydrate] Swelling     Unknown        FAMILY HISTORY:  Family History   Problem Relation Age of Onset     Heart Disease Father 50        heart attack     Cerebrovascular Disease Father      Diabetes Father      Hypertension Father      Depression Father      C.A.D. Father      Hypertension Mother      Arthritis Mother      Heart Disease Mother         long qt syndrome     Thyroid Disease Mother      C.A.D. Mother      Heart Disease Brother 15        MI at 15, 16.      Diabetes Maternal Uncle      Hypertension Maternal Aunt      Hypertension Maternal Uncle      Arthritis Brother         he passed away, had severe arthritis at age 11     Arthritis Maternal Uncle      Eye Disorder Maternal Uncle         cataracts     Neurologic Disorder Sister         migraines     Neurologic Disorder Sister         migraines     Respiratory Son         asthma     Cerebrovascular Disease Maternal Uncle      C.A.D. Brother      Family History Negative Other         neg for RA, SLE     Unknown/Adopted No family hx of         unknown neurological issues in her family, mother was adopted     Skin Cancer No family hx of         No known family hx of skin cancer       SOCIAL HISTORY:  Social History     Socioeconomic History     Marital status: Single     Spouse name: None     Number of children: 1     Years of education: None     Highest education level: None    Occupational History     Employer: NONE    Social Needs     Financial resource strain: None     Food insecurity:     Worry: None     Inability: None     Transportation needs:     Medical: None     Non-medical: None   Tobacco Use     Smoking status: Current Every Day Smoker     Packs/day: 0.20     Years: 1.00     Pack years: 0.20     Types: Cigarettes     Last attempt to quit: 2/1/2016     Years since quitting: 3.4     Smokeless tobacco: Never Used   Substance and Sexual Activity     Alcohol use: No     Alcohol/week: 0.0 oz     Drug use: No     Sexual activity: Not Currently   Lifestyle     Physical activity:     Days per week: None     Minutes per session: None     Stress: None   Relationships     Social connections:     Talks on phone: None     Gets together: None     Attends [employer] service: None     Active member of club or organization: None     Attends meetings of clubs or organizations: None     Relationship status: None     Intimate partner violence:     Fear of current or ex partner: None     Emotionally abused: None     Physically abused: None     Forced sexual activity: None   Other Topics Concern     Parent/sibling w/ CABG, MI or angioplasty before 65F 55M? Yes   Social History Narrative    Balanced Diet - sometimes    Osteoporosis Prevention Measures - Dairy servings per day: 2 servings weekly    Regular Exercise -  Yes Describe walking 4 times a week    Dental Exam - NO    Seatbelts used - Yes    Self Breast Exam - Yes    Abuse: Current or Past (Physical, Sexual or Emotional)- No    Do you have any concerns about STD's -  No    Do you feel safe in your environment - No    Guns stored in the home - No    Sunscreen used - Yes    Lipids -  YES - Date: 053102    Glucose -  YES - Date: 012804    Eye Exam - YES - Date: one year ago    Colon Cancer Screening - No    Hemoccults - NO    Pap Test -  YES - Date: 070904, remote history of LEEP    Mammography - YES - Date: last spring, would like to discuss,  "needs a referral to [facility]    DEXA - NO    Immunizations reviewed and up to date - Yes, last td given in 1997 or 1998       ROS:   Constitutional: No fever, chills, or sweats. Intermittent weight gain/loss   ENT: No visual disturbance, ear ache, epistaxis, sore throat  Allergies/Immunologic: Negative.   Respiratory: No cough, hemoptysia  Cardiovascular: As per HPI  GI: No nausea, vomiting, hematemesis, melena, or hematochezia  : No urinary frequency, dysuria, or hematuria  Integument: Negative  Psychiatric: Negative  Neuro: Negative  Endocrinology: Negative   Musculoskeletal: Negative    EXAM:  /72 (BP Location: Left arm, Patient Position: Chair, Cuff Size: Adult Regular)   Pulse 80   Ht 1.6 m (5' 3\")   Wt 77.1 kg (170 lb)   SpO2 99%   BMI 30.11 kg/m     In general, the patient is a pleasant female in no apparent distress.    HEENT: NC/AT.  PERRLA.  EOMI.  Sclerae white, not injected.  Nares clear.  Pharynx without erythema or exudate.  Dentition intact.    Neck: No adenopathy.  No thyromegaly. Carotids +4/4 bilaterally without bruits.  No jugular venous distension.   Heart: RRR. Normal S1, S2 splits physiologically. No murmur, rub, click, or gallop. The PMI is in the 5th ICS in the midclavicular line. There is no heave.    Lungs: CTA.  No ronchi, wheezes, rales.  No dullness to percussion.   Abdomen: Soft, nontender, nondistended. No organomegaly.  No bruits.   Extremities: No clubbing, cyanosis, or edema.  The pulses are +4/4 at the radial, brachial, femoral, popliteal, DP, and PT sites bilaterally.  No bruits are noted.  Neurologic: Alert and oriented to person/place/time, normal speech, gait and affect  Skin: No petechiae, purpura or rash.      BP at the end of visit: 128/86     Labs:  LIPID RESULTS:  Lab Results   Component Value Date    CHOL 132 07/11/2019    HDL 40 (L) 07/11/2019    LDL 62 07/11/2019    TRIG 149 07/11/2019    CHOLHDLRATIO 3.5 07/29/2015    NHDL 92 07/11/2019 "       LIVER ENZYME RESULTS:  Lab Results   Component Value Date    AST 17 10/29/2019    ALT 26 10/29/2019       CBC RESULTS:  Lab Results   Component Value Date    WBC 11.6 (H) 10/29/2019    RBC 4.73 10/29/2019    HGB 12.3 10/29/2019    HCT 39.1 10/29/2019    MCV 83 10/29/2019    MCH 26.0 (L) 10/29/2019    MCHC 31.5 10/29/2019    RDW 13.8 10/29/2019     10/29/2019       BMP RESULTS:  Lab Results   Component Value Date     07/11/2019    POTASSIUM 3.9 07/11/2019    CHLORIDE 102 07/11/2019    CO2 26 07/11/2019    ANIONGAP 6 07/11/2019     (H) 07/11/2019    BUN 22 07/11/2019    CR 1.04 10/29/2019    GFRESTIMATED 61 10/29/2019    GFRESTBLACK 71 10/29/2019    KATHY 9.3 07/11/2019        A1C RESULTS:  Lab Results   Component Value Date    A1C 9.2 (H) 03/06/2019       INR RESULTS:  Lab Results   Component Value Date    INR 0.97 08/25/2016    INR 0.98 05/20/2015       Procedures:   Echocardiogram 07-11-19  Global and regional left ventricular function is normal with an EF of 55-60%.  Mild LVH.  Left ventricular diastolic function is indeterminate.  Right ventricular function, chamber size, wall motion, and thickness are  normal.  No significant valve pathology.  Normal estimated right atrial pressure.  Cannot estimate PA pressure.  No pericardial effusion.     There has been no change.  This study was compared with the study from 7/18/2018  _____________________________________________________________________________  __        Left Ventricle  Global and regional left ventricular function is normal with an EF of 55-60%.  Mild concentric wall thickening consistent with left ventricular hypertrophy  is present. Left ventricular diastolic function is indeterminate. No regional  wall motion abnormalities are seen.     Right Ventricle  Right ventricular function, chamber size, wall motion, and thickness are  normal.     Atria  The right atria appears normal. Mild left atrial enlargement is present.     Mitral  Valve  Mitral leaflet thickness is normal . Trace mitral insufficiency is present.        Aortic Valve  Aortic valve is normal in structure and function. No significant dysfunction.  Not visualized well.     Tricuspid Valve  The tricuspid valve is normal. Trace tricuspid insufficiency is present. The  peak velocity of the tricuspid regurgitant jet is not obtainable. Pulmonary  artery systolic pressure cannot be assessed.     Pulmonic Valve  The pulmonic valve is normal. Trace pulmonic insufficiency is present.     Vessels  The aorta root is normal. The thoracic aorta is normal. The inferior vena cava  is normal. IVC diameter <2.1 cm collapsing >50% with sniff suggests a normal  RA pressure of 3 mmHg.     Pericardium  Prominent epicardial fat is noted. No pericardial effusion is present.        Compared to Previous Study  There has been no change. This study was compared with the study from  2018 .  _____________________________________________________________________________  __  MMode/2D Measurements & Calculations  IVSd: 1.2 cm     LVIDd: 4.3 cm  LVIDs: 3.0 cm  LVPWd: 1.1 cm  FS: 29.3 %  LV mass(C)d: 170.1 grams  LV mass(C)dI: 96.3 grams/m2  Ao root diam: 2.8 cm  asc Aorta Diam: 2.8 cm  LVOT diam: 2.0 cm  LVOT area: 3.2 cm2  RWT: 0.51        Doppler Measurements & Calculations  MV E max ash: 66.0 cm/sec  MV A max ash: 85.9 cm/sec  MV E/A: 0.77  MV dec slope: 288.0 cm/sec2  MV dec time: 0.23 sec  E/E' av.8  Lateral E/e': 7.7  Medial E/e': 11.9          Assessment and Plan:   54 yr -American patient with DM2, Hypothyroidism, PCOS, Dyslipidemia, HTN, and CAD S/P NSTEMI s/p KATHERINE to the mLAD in  and RCA in 2016 comes for follow-up and chest discomfort relieved with nitroglycerin    #CAD s/p KATHERINE to mLAD and RCA  #Angina  Given patient's strong history of CAD and typical symptoms she has a high likelihood of progressive CAD.    - EKG in clinic:sin rythm        - will schedule coronary angiogram  (planned January 25th 2020)  - continue with same medical regimen        I evaluated  and examined patient with CV fellow. I disucssed results and future plan with patient. I agree with CV fellow's note to make it a more cohesive document.    Milan Peace MD, PhD  Professor of Medicine  Division of Cardiology     CC  Patient Care Team:  Kash Solano MD as PCP - General (Family Practice)  Milan Peace MD as MD (Cardiology)  Majo Mckinnon MD as MD (Rheumatology)  Jas Venron MD as Referring Physician (Ophthalmology)  Charis Holbrook MD as Resident (Ophthalmology)  Katia Pastor, KAYLYNN CNP as Assigned PCP  Sangeetha Vasquez MD as MD (Otolaryngology)  Selena Underwood LPN as Nurse Coordinator (Cardiology)  DARLING THOMPSON

## 2020-02-06 NOTE — NURSING NOTE
Left Heart Catheterization: Patient given Coronary Angiogram and Angioplasty: A Patient's Guide booklet. Patient was instructed regarding left heart catheterization, including discussion of the indication, procedure, preparation, intra-procedural steps, and recovery at home. Patient demonstrated understanding of this information and agreed to call with further questions or concerns.  Med Reconcile: Reviewed and verified all current medications with the patient. The updated medication list was printed and given to the patient.  Return Appointment: Patient given instructions regarding scheduling next clinic visit. Patient demonstrated understanding of this information and agreed to call with further questions or concerns.  Patient stated she understood all health information given and agreed to call with further questions or concerns.    Selena Underwood LPN

## 2020-02-06 NOTE — PATIENT INSTRUCTIONS
Patient Instructions:  It was a pleasure to see you in the cardiology clinic today.      If you have any questions, you can reach my nurse, Selena CORBETT LPN, at [phone].  Press Option #1 for the [facility], and then press Option #4 for nursing.    We are encouraging the use of [employer] to communicate with your HealthCare Provider    Medication Changes: None.     Recommendations: None.    Studies Ordered: A Coronary Angiogram.    You are scheduled for a Coronary Angiogram on January 25th [place] [address]. You are to check in at the [facility] Waiting Area at 11 AM.     When you arrive you will be escorted back to the pre procedure area called 2A. Here they will insert an IV, draw labs, and obtain a short medical history. Please bring an updated list of your current medications.    A physician will come and talk with you about the procedure and obtain consent.    A nurse from the Cardiac Catheterization Lab will then escort you to the procedure area. You will be receiving sedation during the procedure so you will need someone to drive you to and from the hospital.    After the procedure you will recover for a short period (2 - 6 hours) on 6B. You will be discharged with instructions for post procedure care. However, depending on what the angiogram shows you may have to have stents placed and this might require an overnight stay. We ask that you bring a small bag of necessities for your comfort if you would need to stay overnight. DO NOT BRING ANY VALUABLES!      Pre procedure instructions:    1. Make arrangements to have a  as you will not be allowed to drive following the procedure. Someone should stay with you the night after your procedure.  2.  Do not eat any solid food or milk products for 8 hours prior to the exam. You may drink clear liquids until 2 hours prior to the exam. Clear liquids include the  following: water, Jell-O, clear broth, apple juice or any non-carbonated drink that you can see through (no pop!).   3. Do not drink alcohol or smoke 24 hours prior to test.  4. Hold these meds:  Do not take your oral diabetic medication or short acting insulin the day of the procedure. Do not take metformin the day of and for 2 days following, contact your PCP regarding glucose control during this time.  Hold your warfarin (2-3 days prior), pradaxa (2 days prior), Eliquis/Xarelto 1 day prior.   6. You may take your other morning medications as prescribed with a sip of water. If you currently take an aspirin, continue taking your same dose. If not, take a 325 mg aspirin the day before and the day of your procedure.     The results from today include: EKG.    Please follow up: With Dr. Peace based on results of testing.  Follow up with a nurse practitioner approximately 2-4 weeks following angiogram.    Sincerely,    Milan Peace MD     If you have an urgent need after hours (8:00 am to 4:30 pm) please call [phone] and ask for the cardiology fellow on call.

## 2020-02-09 DIAGNOSIS — D50.9 IDA (IRON DEFICIENCY ANEMIA): ICD-10-CM

## 2020-02-12 RX ORDER — FERROUS GLUCONATE 324(38)MG
324 TABLET ORAL 2 TIMES DAILY WITH MEALS
Qty: 180 TABLET | Refills: 0 | Status: ON HOLD | OUTPATIENT
Start: 2020-02-12 | End: 2021-05-28

## 2020-02-12 NOTE — TELEPHONE ENCOUNTER
Tried calling patient, no answered. Left message with Primary Care clinic number requesting patient to call back to schedule annual appointment with PCP

## 2020-02-12 NOTE — TELEPHONE ENCOUNTER
Last Clinic Visit: 11/9/18, no visits scheduled, provided 90 day victorino RX and routed to clinic to contact to schedule per protocol

## 2020-02-20 ENCOUNTER — TELEPHONE (OUTPATIENT)
Dept: RHEUMATOLOGY | Facility: CLINIC | Age: 55
End: 2020-02-20

## 2020-02-21 ENCOUNTER — ANCILLARY PROCEDURE (OUTPATIENT)
Dept: GENERAL RADIOLOGY | Facility: CLINIC | Age: 55
End: 2020-02-21
Attending: INTERNAL MEDICINE
Payer: COMMERCIAL

## 2020-02-21 ENCOUNTER — OFFICE VISIT (OUTPATIENT)
Dept: RHEUMATOLOGY | Facility: CLINIC | Age: 55
End: 2020-02-21
Attending: INTERNAL MEDICINE
Payer: COMMERCIAL

## 2020-02-21 VITALS
DIASTOLIC BLOOD PRESSURE: 71 MMHG | WEIGHT: 172.7 LBS | OXYGEN SATURATION: 98 % | SYSTOLIC BLOOD PRESSURE: 107 MMHG | BODY MASS INDEX: 30.59 KG/M2 | HEART RATE: 87 BPM

## 2020-02-21 DIAGNOSIS — Z51.81 ENCOUNTER FOR MEDICATION MONITORING: ICD-10-CM

## 2020-02-21 DIAGNOSIS — M31.30 GRANULOMATOSIS WITH POLYANGIITIS WITHOUT RENAL INVOLVEMENT (H): ICD-10-CM

## 2020-02-21 DIAGNOSIS — M31.30 GRANULOMATOSIS WITH POLYANGIITIS WITHOUT RENAL INVOLVEMENT (H): Primary | ICD-10-CM

## 2020-02-21 DIAGNOSIS — E55.9 VITAMIN D DEFICIENCY: ICD-10-CM

## 2020-02-21 LAB
ALBUMIN SERPL-MCNC: 4 G/DL (ref 3.4–5)
ALBUMIN UR-MCNC: NEGATIVE MG/DL
ALT SERPL W P-5'-P-CCNC: 31 U/L (ref 0–50)
APPEARANCE UR: CLEAR
AST SERPL W P-5'-P-CCNC: 19 U/L (ref 0–45)
BASOPHILS # BLD AUTO: 0.1 10E9/L (ref 0–0.2)
BASOPHILS NFR BLD AUTO: 0.4 %
BILIRUB UR QL STRIP: NEGATIVE
CD19 CELLS # BLD: <1 CELLS/UL (ref 107–698)
CD19 CELLS NFR BLD: <1 % (ref 6–27)
COLOR UR AUTO: YELLOW
CREAT SERPL-MCNC: 1.05 MG/DL (ref 0.52–1.04)
CREAT UR-MCNC: 243 MG/DL
CRP SERPL-MCNC: <2.9 MG/L (ref 0–8)
DEPRECATED CALCIDIOL+CALCIFEROL SERPL-MC: 32 UG/L (ref 20–75)
DIFFERENTIAL METHOD BLD: ABNORMAL
EOSINOPHIL # BLD AUTO: 0.4 10E9/L (ref 0–0.7)
EOSINOPHIL NFR BLD AUTO: 2.3 %
ERYTHROCYTE [DISTWIDTH] IN BLOOD BY AUTOMATED COUNT: 13.5 % (ref 10–15)
ERYTHROCYTE [SEDIMENTATION RATE] IN BLOOD BY WESTERGREN METHOD: 10 MM/H (ref 0–30)
GFR SERPL CREATININE-BSD FRML MDRD: 60 ML/MIN/{1.73_M2}
GLUCOSE UR STRIP-MCNC: >499 MG/DL
HCT VFR BLD AUTO: 38.6 % (ref 35–47)
HGB BLD-MCNC: 12.4 G/DL (ref 11.7–15.7)
HGB UR QL STRIP: NEGATIVE
HYALINE CASTS #/AREA URNS LPF: 13 /LPF (ref 0–2)
IGA SERPL-MCNC: 233 MG/DL (ref 84–499)
IGG SERPL-MCNC: 690 MG/DL (ref 610–1616)
IGM SERPL-MCNC: 49 MG/DL (ref 35–242)
IMM GRANULOCYTES # BLD: 0.1 10E9/L (ref 0–0.4)
IMM GRANULOCYTES NFR BLD: 0.7 %
KETONES UR STRIP-MCNC: 5 MG/DL
LEUKOCYTE ESTERASE UR QL STRIP: NEGATIVE
LYMPHOCYTES # BLD AUTO: 1.9 10E9/L (ref 0.8–5.3)
LYMPHOCYTES NFR BLD AUTO: 12.3 %
MCH RBC QN AUTO: 25.9 PG (ref 26.5–33)
MCHC RBC AUTO-ENTMCNC: 32.1 G/DL (ref 31.5–36.5)
MCV RBC AUTO: 81 FL (ref 78–100)
MONOCYTES # BLD AUTO: 0.9 10E9/L (ref 0–1.3)
MONOCYTES NFR BLD AUTO: 5.9 %
MUCOUS THREADS #/AREA URNS LPF: PRESENT /LPF
MYELOPEROXIDASE AB SER-ACNC: 1.2 AI (ref 0–0.9)
NEUTROPHILS # BLD AUTO: 12.1 10E9/L (ref 1.6–8.3)
NEUTROPHILS NFR BLD AUTO: 78.4 %
NITRATE UR QL: NEGATIVE
NRBC # BLD AUTO: 0 10*3/UL
NRBC BLD AUTO-RTO: 0 /100
PH UR STRIP: 5 PH (ref 5–7)
PLATELET # BLD AUTO: 379 10E9/L (ref 150–450)
PROT UR-MCNC: 0.45 G/L
PROT/CREAT 24H UR: 0.19 G/G CR (ref 0–0.2)
PROTEINASE3 IGG SER-ACNC: <0.2 AI (ref 0–0.9)
RBC # BLD AUTO: 4.79 10E12/L (ref 3.8–5.2)
RBC #/AREA URNS AUTO: <1 /HPF (ref 0–2)
SOURCE: ABNORMAL
SP GR UR STRIP: 1.02 (ref 1–1.03)
SQUAMOUS #/AREA URNS AUTO: 1 /HPF (ref 0–1)
UROBILINOGEN UR STRIP-MCNC: 0 MG/DL (ref 0–2)
WBC # BLD AUTO: 15.4 10E9/L (ref 4–11)
WBC #/AREA URNS AUTO: 1 /HPF (ref 0–5)

## 2020-02-21 PROCEDURE — 85025 COMPLETE CBC W/AUTO DIFF WBC: CPT | Performed by: INTERNAL MEDICINE

## 2020-02-21 PROCEDURE — 81001 URINALYSIS AUTO W/SCOPE: CPT | Performed by: INTERNAL MEDICINE

## 2020-02-21 PROCEDURE — 83516 IMMUNOASSAY NONANTIBODY: CPT | Performed by: INTERNAL MEDICINE

## 2020-02-21 PROCEDURE — 82565 ASSAY OF CREATININE: CPT | Performed by: INTERNAL MEDICINE

## 2020-02-21 PROCEDURE — 82784 ASSAY IGA/IGD/IGG/IGM EACH: CPT | Performed by: INTERNAL MEDICINE

## 2020-02-21 PROCEDURE — 86140 C-REACTIVE PROTEIN: CPT | Performed by: INTERNAL MEDICINE

## 2020-02-21 PROCEDURE — G0463 HOSPITAL OUTPT CLINIC VISIT: HCPCS | Mod: ZF

## 2020-02-21 PROCEDURE — 82040 ASSAY OF SERUM ALBUMIN: CPT | Performed by: INTERNAL MEDICINE

## 2020-02-21 PROCEDURE — 85652 RBC SED RATE AUTOMATED: CPT | Performed by: INTERNAL MEDICINE

## 2020-02-21 PROCEDURE — 83876 ASSAY MYELOPEROXIDASE: CPT | Performed by: INTERNAL MEDICINE

## 2020-02-21 PROCEDURE — 36415 COLL VENOUS BLD VENIPUNCTURE: CPT | Performed by: INTERNAL MEDICINE

## 2020-02-21 PROCEDURE — 84450 TRANSFERASE (AST) (SGOT): CPT | Performed by: INTERNAL MEDICINE

## 2020-02-21 PROCEDURE — 86255 FLUORESCENT ANTIBODY SCREEN: CPT | Performed by: INTERNAL MEDICINE

## 2020-02-21 PROCEDURE — 84156 ASSAY OF PROTEIN URINE: CPT | Performed by: INTERNAL MEDICINE

## 2020-02-21 PROCEDURE — 82306 VITAMIN D 25 HYDROXY: CPT | Performed by: INTERNAL MEDICINE

## 2020-02-21 PROCEDURE — 86355 B CELLS TOTAL COUNT: CPT | Performed by: INTERNAL MEDICINE

## 2020-02-21 PROCEDURE — 84460 ALANINE AMINO (ALT) (SGPT): CPT | Performed by: INTERNAL MEDICINE

## 2020-02-21 ASSESSMENT — PAIN SCALES - GENERAL: PAINLEVEL: SEVERE PAIN (6)

## 2020-02-21 NOTE — TELEPHONE ENCOUNTER
Spoke to patient , called with a reminder about there upcoming appointment , also instructed to bring medications or medication list.    Aide Blackwell CMA

## 2020-02-21 NOTE — NURSING NOTE
Chief Complaint   Patient presents with     RECHECK     Seronegative RA           /71 (BP Location: Left arm, Patient Position: Sitting, Cuff Size: Adult Regular)   Pulse 87   Wt 78.3 kg (172 lb 11.2 oz)   SpO2 98%   BMI 30.59 kg/m  ;        Amada Vizcaino CMA    2/21/2020 10:33 AM

## 2020-02-21 NOTE — PROGRESS NOTES
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 Cornell is a 49 yo AAF who was referred to our clinic for evaluation and management of her joint pain in setting of positive RF 26.    Her joint symptoms first started more than a year ago, but over last 6-8 months fluctuating some good and bad days . All her joints are involved. Over last 5 months, hands became swollen, warm and painful. Tylenol does not help. Ketoprofen did not help. Was told to avoid NSAIDs given ACS. Reports AM/PM stiffness x 2-3 hr, can't make full fist when wakes up in AM.    She feels tired all the time. Reports worsening hair loss and unchanged  facial rash. Gets red sore flaky rash over cheeks across her face which is intermittent diagnosed Rosacea and is prescribed metronidazole gel which she has not started using. Reports occasional oral ulcers. Hands feel cold with red discoloration last winter. Has occasional dysphagia to both solids and liquid. Has dry eyes, is using OTC allergy eyedrops. Has chronic abdominal pain. Has h/o pancreatitis. Sometime has anxiety. Occasionally has numbness, tingling in fingers/toes. Has back and spine issues, her whole back and neck hurts, different areas at different time. She is wondering if she has FMS. Has hard time sleeping, has never been diagnosed with BRENNA. She does not know if he snores. She was found to have slightly positive RF in 2/2013. Has microcytic anemia.     Her arthralgia gets worse with drop in temperature. Reports body ache. Activity makes her pain worse. Her joint swelling isintermittent. Her body pain and joint pain is the same. Reports AM stiffness x 3 hr.    She has been doing acupuncture x few months and it is helping with her pain. She thinks that the combination of plaquenil and acupuncture is helpful but overall she  notice 30% in her symptoms relief.     Takes tylenol and tramadol on occasion for pain.    She decided to use different formulation of metformin which helped with her abdominal pain. I referred her to GI for evaluation of elevated lipase and abdominal pain. She decided to see GI in the future.     She is on  mg qd since 5/2014, tolerates it well and it helps her some. Denies taking any gabapentin due to chest pain and headches. She has had referral her for water aerobics, but she can't begin until she's healed from recent surgery in 10/2014. She thinks HCQ is helping but not enough to control all her pain, reports 2-3 hr of AM stiffness with ongoing diffuse arthralgia/myalgia along with intermittent swelling of hands. She does see her acupuncture person and it helps with her pain, has not started water aerobics yet. Has got her flu shot.     10/2015: Reports having pleuritic CP in 3/2015, was prescribed Lidoderm patches first. It did not help. Took prednisone (?dose) by her own, pain got better but it recurred off prednisone and gradually got worse to the point that she could not stand the pain anymore, was seen in ER in 5/2015, was treated with medrol dose pack which helped. Reports pain over hips, knees, ankles and fingers. Pain gets worse with walking. Reports myalgia, swelling of the arms. Pain over neck, shoulders. Has AM stiffness x 3-4 hr. Fingers swell up and become painful. Can't remember if steroid helped with joint pain. She had headaches, severe CP when she took tramadol and gabapentin and stopped taking them, sx resolved but she can't tell which one caused SEs but thinks that probably it was gabapentin. She has good days and tries to be more active but activity aggravates the pain. Headaches are intermittent. HCQ helps some not enough to control all the pain. No HCQ SEs. Had eye exam around July 2015. Flexeril helps but PCP wants to change flexeril to zanaflex, reporting that she is NOT  comfortable with the change. Acupuncture still helps an she continued to  do that. Concerned about fat pads she has in supraclavicular area, has h/o thyroid nodules. Continues having hair loss, takes iron for iron deficiency.     2/2016: She has 2 major complaints today, increased joint pain/swelling in hands/feet and recent Dx of optic neuritis in R eye, reports having similar problem about 20 yr ago, now recurred. Has a spot in R eye vision x long term, was seen by ophthalmology few days ago and was told to have optic neuritis. Gets joint pain, muscle pain. Can't  objects, hands are swollen. Knees, hips and ankles are painful. Reports more arthralgia. AM stiffness/ pain is 3-4 hr. She wants me to talk to her ophthalmologist directly.    She had botox inj for migraines which did not help her. She is going to have angiogram next wk, had to take more nitro for CP recently.     4/29/2016: She reports being on steroid taper x 3 since last visit. Reportedly, had orbit MRI, it showed swelling/inflamamtion of R lacrimal glands. She had painful swelling of her R eye, cause her headaches. Botox inj made her headaches worse. She is being dealing with this since 1/2016, with severe headaches and pain/swelling around R eye. Had biopsy in 3/2016. She is frustrated as prednisone causes her weight gain and her BG is difficult to control on it.    5/2016: At last visit, was prescribed MTX 10 mg po qwk to take along with FA 1 mg qd. She decided not to take MTX, is afraid of side effects, believes all these medications would harm her. She also believes that botox caused her inflammation around R eye. No major flare up of per-orbital inflamamtion since last visit but continues to have swelling around her R eye.    11/2016: Reports diffuse body ache, arthralgia, myalgia especially with weather change. No major flare up of campos-orbital inflammation but her face/around R eye swells up from time to time. Reports 2 episodes of CP over  past 2 mo, nitro sometimes helps and sometimes does not help. She has asthma and costochondritis and she has hard time to distinguish the origin of pain. Sometimes knees and fingers swell up. Her stiffness is sometimes all day.    4/2017:  Reports having sinusitis since last visit. Her R eye is dry and is using eyedrops to keep it moist. Reports having pain in ears. Was treated with antibiotics by PCP, it got better then it got worse. Reports catching infections easily. Then started having pressure over eyes with pain/headaches (exact start date is unknown). Pain gradually got worse and became persistent. Had sinus CT.     4/7/2017: Having a flare up of swelling, pain around her R orbit. Has not tried MTX yet.    5/2017: On MTX 10 mg po qwk since 4/7/2017, reports increased stomach pain and nausea and new headaches since start of MTX and it's not helping. Pain/swelling around R orbit is worse.    6/2017: She finished prednisone taper prescribed at last visit, R campos-orbital swelling significantly improved. 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    2/2018: R campos-orbital swelling has improved. Repeat R lacrimal gland biopsy in 8/2017 showed non specific inflammation with no lymphoma. She is off steroid. Did not tolerate AZA due to N/V and abdominal pain.    Is back with recurrence of R campos-orbital sweling x past 2 months. Pain really got worse over past 3wk, requries     9/2018: Declined ritiximab at last visit, lost f/u since 2/2018. Went to [facility] and was seen on 8/29 by Dr. 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 Reportedly he ordered labs and recommended surgery since the inflammation of the R eye is back and is pushing the eye down. Janine took some prednsione 30 mg every day few days a go for pain.    3/2019: She had lateral  orbitotomy and debulking orbital mass right eye on 9/26/18 with Dr. Shah and Dr. Valdez at [place]. 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 There were no complications according to the op note.    Janine finally agreed to receive rituximab for her GPA. She had it as 1 gr q2wk x 2 on 12/12/2018 and 12/26/2018. Had minor allergic reaction which was managed by IV solu cortef and benadryl. She is off prednisone about 6wk after surgery. Had bleeding ulcer from prednsione. Has pain over sinus, ears. Still has residual pain, swelling around her R eye is concerned about it. Wants to transfer her care to ophthalmology here as it's closer to her.    Cold induced sweating congestion joint pain  Facial swelling  allegy doctor clear ear pft ok doxy sinusitis     (7/12/19):  Patient completed her 2 rounds of rituximab in June. She tolerated well. She was recently seen by Dr. Vernon in [facility] and repeat CT scan of orbits shows some decrease in proptosis. She reports that her R eye still intermittently feels puffy. She also mentions intermittent swelling in her arms. Unclear of any triggers. Limbs are also heavy with muscle aches and some joint pains. Exacerbated with activity.    10/29/2019:    Not feeling well with headaches, diffuse arthralgia and myalgia being worse over past 2 months. Headaches wax and wane and never go away. Gets swelling around her R eyes with numbness/tingling of R cheek.    Gets hives randomly, over trunk, extremities.    Has shortness of breath on walking fast. Gets cough from asthma, nothing new. No hemoptysis. Fingers start to hurt and itch with drop in T.      Today 2/21/2020:    Not feeling well. Reports SOB, pleuritic CP, dry cough since early Feb. Feels retaining fluid. Gets headaches. Her cardiologist recommended cardiac cath. Tylenol is not helping. T was 101 last wk, not today, took tylenol to drop tylenol and used ice pack on her head and had lots of water and tea, has poor  appetite. Feeling better but cough is still there, worse in AM and night. First had blood in sputum but does not have it anymore. Has diffuse arthralgia, myalgia, worse x past 3 months due to cold weather. Fatigue is worse. No pleuritic CP today. No fevers today but feels short of breath a lot (became chronic), worse with activity.    No rash.    Her eye swelling/pseudotumor is quiet, still has residual numbness from surgery, gets headaches.    Saw Dr. Vernon in 1/2020, Dr. Vernon thought that pseudotumor responded to rituximab.      Last rituximab was in 12/5/2019 and 12/19/2019.    Component      Latest Ref Rng 2/28/2013 2/28/2013          12:14 PM 12:14 PM   WBC      4.0 - 11.0 10e9/L     RBC Count      3.8 - 5.2 10e12/L     Hemoglobin      11.7 - 15.7 g/dL     Hematocrit      35.0 - 47.0 %     MCV      78 - 100 fl     MCH      26.5 - 33.0 pg     MCHC      31.5 - 36.5 g/dL     RDW      10.0 - 15.0 %     Platelet Count      150 - 450 10e9/L     Specimen Description           Lyme Screen IgG and IgM           Vitamin D Deficiency screening      30 - 75 ug/L     Ferritin      10 - 300 ng/mL     Sed Rate      0 - 20 mm/h     ALLI Screen by EIA      <1.0     Rheumatoid Factor      0 - 14 IU/mL     CK Total      30 - 225 U/L  78   Uric Acid      2.5 - 7.5 mg/dL 6.7      Component      Latest Ref Rng 2/28/2013          12:14 PM   WBC      4.0 - 11.0 10e9/L    RBC Count      3.8 - 5.2 10e12/L    Hemoglobin      11.7 - 15.7 g/dL    Hematocrit      35.0 - 47.0 %    MCV      78 - 100 fl    MCH      26.5 - 33.0 pg    MCHC      31.5 - 36.5 g/dL    RDW      10.0 - 15.0 %    Platelet Count      150 - 450 10e9/L    Specimen Description       Serum   Lyme Screen IgG and IgM       Test value: <0.75....Interpretation: Negative....If you highly suspect Lyme . . .   Vitamin D Deficiency screening      30 - 75 ug/L    Ferritin      10 - 300 ng/mL    Sed Rate      0 - 20 mm/h    ALLI Screen by EIA      <1.0    Rheumatoid Factor      0 - 14  IU/mL    CK Total      30 - 225 U/L    Uric Acid      2.5 - 7.5 mg/dL      Component      Latest Ref Rng 2/28/2013 2/28/2013 2/28/2013 2/28/2013          12:14 PM 12:14 PM 12:14 PM 12:14 PM   WBC      4.0 - 11.0 10e9/L       RBC Count      3.8 - 5.2 10e12/L       Hemoglobin      11.7 - 15.7 g/dL       Hematocrit      35.0 - 47.0 %       MCV      78 - 100 fl       MCH      26.5 - 33.0 pg       MCHC      31.5 - 36.5 g/dL       RDW      10.0 - 15.0 %       Platelet Count      150 - 450 10e9/L       Specimen Description             Lyme Screen IgG and IgM             Vitamin D Deficiency screening      30 - 75 ug/L       Ferritin      10 - 300 ng/mL    10   Sed Rate      0 - 20 mm/h   23 (H)    ALLI Screen by EIA      <1.0  <1.0 . . .     Rheumatoid Factor      0 - 14 IU/mL 26 (H)      CK Total      30 - 225 U/L       Uric Acid      2.5 - 7.5 mg/dL         Component      Latest Ref Rng 2/28/2013 2/28/2013          12:14 PM 12:14 PM   WBC      4.0 - 11.0 10e9/L 14.1 (H)    RBC Count      3.8 - 5.2 10e12/L 4.55    Hemoglobin      11.7 - 15.7 g/dL 10.7 (L)    Hematocrit      35.0 - 47.0 % 33.3 (L)    MCV      78 - 100 fl 73 (L)    MCH      26.5 - 33.0 pg 23.5 (L)    MCHC      31.5 - 36.5 g/dL 32.1    RDW      10.0 - 15.0 % 18.1 (H)    Platelet Count      150 - 450 10e9/L 407    Specimen Description           Lyme Screen IgG and IgM           Vitamin D Deficiency screening      30 - 75 ug/L  32   Ferritin      10 - 300 ng/mL     Sed Rate      0 - 20 mm/h     ALLI Screen by EIA      <1.0     Rheumatoid Factor      0 - 14 IU/mL     CK Total      30 - 225 U/L     Uric Acid      2.5 - 7.5 mg/dL          MRI CERVICAL SPINE WITHOUT CONTRAST 4/3/2013 12:47 PM    HISTORY: Cervicalgia. Degeneration of cervical intervertebral disc.  MRI cervical spine. Evaluate bilateral supraclavicular lymph nodes.  Clinical enlargement.    TECHNIQUE: Multiplanar multisequence MRI of the cervical spine  without gadolinium contrast.    COMPARISON:  None.    FINDINGS: The patient has a developmentally small central canal.  Images of the cervical cord reveals small areas of T2 hyperintensity  within the cervical cord. There is a small area of high signal  intensity at the C1 level. There is also a small area of high signal  intensity at the C6-C7 level. The area of high signal at C6-C7 is  located at an area of central spinal stenosis. This may be due to  myelomalacia. The area of high signal at C1-C2 is indeterminate.    C2-C3: Normal disc, facet joints, spinal canal and neural foramina.    C3-C4: Normal disc, facet joints, spinal canal and neural foramina.    C4-C5: Normal disc, facet joints, spinal canal and neural foramina.    C5-C6: There is degeneration of the disc. There is a mild annular  disc bulge. The central canal is mild-moderately narrowed. The  residual AP diameter of the central spinal canal measures  approximately 9 mm.    C6-C7: There is degeneration of the disc. There is loss of disc space  height. There is a diffuse annular disc bulge. There are associated  posterior osteophytes. There are severe central spinal stenosis at  this level. The residual AP diameter of the central spinal canal is  only about 6 mm. There is significant flattening of the cord. There  is high signal in the cord at this level consistent with  myelomalacia. There is moderate to severe right foraminal stenosis  due to and uncinate spur.    C7-T1: Normal disc, facet joints, spinal canal and neural foramina.            Result Impression       IMPRESSION:  1. Severe central spinal stenosis at C6-C7 due to a developmentally  small canal and due to a bulging disc and associated posterior  osteophyte. There is flattening of the cord at this level and high  signal in the cord at this level consistent with myelomalacia.  2. There is a small, indeterminate 2-3 mm area of high signal  intensity in the cord at the C1 level. This could be due to gliosis  secondary to a previous  infectious or inflammatory process.  Demyelinating disease could also have a similar appearance. Clinical  correlation suggested.    GAIL MORE MD     MRI LEFT UPPER EXTREMITY NON-JOINT WITHOUT CONTRAST, MRI RIGHT UPPER  EXTREMITY NON-JOINT WITHOUT CONTRAST April 3, 2013 1:32 PM    HISTORY: Cervicalgia. Degeneration of cervical intervertebral disc.    TECHNIQUE: Multiplanar, multisequence imaging of the brachial plexus  was performed without gadolinium contrast enhancement.    COMPARISON: None.    FINDINGS: No mass lesions are seen. No inflammatory process is  identified. The roots, trunks, branches, and divisions of both the  right and left brachial plexus appear normal. No adenopathy is seen.  The anterior scalene muscles and the middle scalene muscles appear  normal. Nodules are seen within the thyroid gland. The largest nodule  is seen in the left lobe of the thyroid. This measures about 1.8 cm  in diameter.            Result Impression       IMPRESSION:  1. Both the right and left brachial plexus regions appear normal.  2. Thyroid nodules. The largest nodule is in the left lobe of the  thyroid. It measures about 1.8 cm in diameter.       XR WRIST BILATERAL G/E 3 VIEWS    Narrative:        EXAMINATION:  1. Each hand 3 views  2. Each wrist 3 views     DATE: 5/1/2013     HISTORY: Arthropathy with concern for rheumatoid.     FINDINGS: 3 views of each hand and 3 views of each wrist are obtained  without prior for comparison. Alignment is normal. There is no  fracture or acute bone abnormality. No distinct erosions are seen.  Some spurring of the distal radial ulnar joint is noted on the right.       Impression:     IMPRESSION:  1. No evidence of an inflammatory arthropathy involving either hand  or wrist.     VIELKA GUEVARA MD         XR FOOT BILATERAL G/E 3 VIEWS    Narrative:        EXAMINATION:  1. Each foot 3 views  2. Each ankle 3 views  3. Sacroiliac joint series     DATE: 5/1/2013     HISTORY: Arthropathy;  concern for rheumatoid.     FINDINGS: No prior for comparison.     A frontal and bilateral oblique evaluation of the sacroiliac joints  shows no malalignment. Some patchy sclerosis is identified along the  central aspect of both sacroiliac joints, which is favored to be  degenerative. No distinct erosions are seen. The visualized portion  of the hip joint spaces appear maintained, with no erosive changes  identified. Mild endplate spurring is noted in the lower lumbar  spine.     3 views of each foot and 3 views of each ankle show no evidence of  acute fracture or dislocation. The metatarsal phalangeal,  tarsometatarsal and intertarsal joint spaces appear maintained. No  erosions are identified. There is no sign of acroosteolysis. The  ankle mortise and talar dome are intact bilaterally. Minimal spurring  is noted along the tip of the medial malleolus bilaterally.       Impression:     IMPRESSION:  1. Patchy sclerosis identified along the central aspect of both  sacroiliac joints, which is favored to be degenerative. No distinct  erosions are seen.  2. No evidence of an inflammatory arthropathy in either foot or ankle.     VIELKA GUEVARA MD     5/2013  CBC WITH PLATELETS DIFFERENTIAL       Component Value Range    WBC 11.3 (*) 4.0 - 11.0 10e9/L    RBC Count 4.56  3.8 - 5.2 10e12/L    Hemoglobin 11.1 (*) 11.7 - 15.7 g/dL    Hematocrit 34.3 (*) 35.0 - 47.0 %    MCV 75 (*) 78 - 100 fl    MCH 24.3 (*) 26.5 - 33.0 pg    MCHC 32.4  31.5 - 36.5 g/dL    RDW 16.1 (*) 10.0 - 15.0 %    Platelet Count 315  150 - 450 10e9/L    Diff Method Automated Method      % Neutrophils 71.6  40 - 75 %    % Lymphocytes 20.9  20 - 48 %    % Monocytes 4.3  0 - 12 %    % Eosinophils 2.8  0 - 6 %    % Basophils 0.2  0 - 2 %    % Immature Granulocytes 0.2  0 - 0.4 %    Absolute Neutrophil 8.1  1.6 - 8.3 10e9/L    Absolute Lymphocytes 2.4  0.8 - 5.3 10e9/L    Absolute Monoctyes 0.5  0.0 - 1.3 10e9/L    Absolute Eosinophils 0.3  0.0 - 0.7 10e9/L     Absolute Basophils 0.0  0.0 - 0.2 10e9/L    Abs Immature Granulocytes 0.0  0 - 0.03 10e9/L   AMYLASE       Component Value Range    Amylase 103  30 - 110 U/L   COMPREHENSIVE METABOLIC PANEL       Component Value Range    Sodium 144  133 - 144 mmol/L    Potassium 3.8  3.4 - 5.3 mmol/L    Chloride 105  94 - 109 mmol/L    Carbon Dioxide 23  20 - 32 mmol/L    Anion Gap 17  6 - 17 mmol/L    Glucose 173 (*) 60 - 99 mg/dL    Urea Nitrogen 13  5 - 24 mg/dL    Creatinine 0.83  0.52 - 1.04 mg/dL    GFR Estimate 74  >60 mL/min/1.7m2    GFR Estimate If Black 90  >60 mL/min/1.7m2    Calcium 9.7  8.5 - 10.4 mg/dL    Bilirubin Total 0.4  0.2 - 1.3 mg/dL    Albumin 4.3  3.9 - 5.1 g/dL    Protein Total 7.8  6.8 - 8.8 g/dL    Alkaline Phosphatase 66  40 - 150 U/L    ALT 36  0 - 50 U/L    AST 28  0 - 45 U/L   CK TOTAL       Component Value Range    CK Total 66  30 - 225 U/L   CRP INFLAMMATION       Component Value Range    CRP Inflammation 10.4 (*) 0.0 - 8.0 mg/L   LIPASE       Component Value Range    Lipase 353 (*) 20 - 250 U/L   ERYTHROCYTE SEDIMENTATION RATE AUTO       Component Value Range    Sed Rate 26 (*) 0 - 20 mm/h   ROUTINE UA WITH MICROSCOPIC REFLEX TO CULTURE       Component Value Range    Color Urine Yellow      Appearance Urine Slightly Cloudy      Glucose Urine 30 (*) NEG mg/dL    Bilirubin Urine Negative  NEG    Ketones Urine 5 (*) NEG mg/dL    Specific Gravity Urine 1.026  1.003 - 1.035    Blood Urine Trace (*) NEG    pH Urine 5.0  5.0 - 7.0 pH    Protein Albumin Urine 10 (*) NEG mg/dL    Urobilinogen mg/dL Normal  0.0 - 2.0 mg/dL    Nitrite Urine Negative  NEG    Leukocyte Esterase Urine Negative  NEG    Source Midstream Urine      WBC Urine 1  0 - 2 /HPF    RBC Urine 4 (*) 0 - 2 /HPF    Squamous Epithelial /HPF Urine <1  0 - 1 /HPF    Mucous Urine Present (*) NEG /LPF    Hyaline Casts 3 (*) 0 - 2 /LPF    Calcium Oxalate Moderate (*) NEG /HPF   COMPLEMENT C3       Component Value Range    Complement C3 143  76 - 169  mg/dL   COMPLEMENT C4       Component Value Range    Complement C4 31  15 - 50 mg/dL   HEPATITIS C ANTIBODY       Component Value Range    Hepatitis C Antibody Negative  NEG   CARDIOLIPIN ANTIBODY IGG AND IGM       Component Value Range    Cardiolipin IgG Marline    0 - 15.0 GPL    Value: <15.0      Interpretation:  Negative    Cardiolipin IgM Marline    0 - 12.5 MPL    Value: <12.5      Interpretation:  Negative   LUPUS PANEL       Component Value Range    Lupus Result    NEG    Value: Negative      (Note)      COMMENTS:      The INR is normal.      APTT is normal.  1:2 Mix is not indicated.      DRVVT Screen is normal.      Thrombin time is normal.      NEGATIVE TEST; A LUPUS ANTICOAGULANT WAS NOT DETECTED IN THIS      SPECIMEN WITHIN THE LIMITS OF THE TESTING REPERTOIRE.      If the clinical picture is strongly suggestive of an antiphospholipid      syndrome, recommend anticardiolipin and beta-2-glycoprotein (IgG and      IgM) antibody tests.      Leela Franks M.D.  [phone]      5/2/2013            INR =  0.93 N = 0.86-1.14  (No additional charge)      TT = 15.7 N = 13.0-19.0 sec  (No additional charge)            APTT'S:    Seconds      Reagent =  Stago LA      Normal  =  38      Patient  =  35      1:2 Mix  =  N/A      Reference =  31-43             DILUTE MADELINE VIPER VENOM TEST:      DRVVT Screen Ratio = 0.76 Normal = <1.21         IMMUNOGLOBULIN G SUBCLASSES       Component Value Range    IGG 1030  695 - 1620 mg/dL    IgG1 488  300 - 856 mg/dL    IgG2 325  158 - 761 mg/dL    IgG3 47  24 - 192 mg/dL    IgG4 18  11 - 86 mg/dL   SUNNY ANTIBODY PANEL       Component Value Range    Ribonucleic Protein IgG Antibody 0      Smith Antibody IgG 1      SSA (RO) Antibody IgG 4      SSB (LA) Antibody IgG 0      Scleroderma Antibody IgG 0     BETA 2 GLYCOPROTEIN ANTIBODIES IGG IGM       Component Value Range    Beta-2-Glycopro IgG 1      Beta-2-Glycopro IgM 5     CYCLIC CIT PEPT IGG       Component Value Range     Cyclic Cit Pept IgG/IgA    <20 UNITS    Value: <20      Interpretation:  Negative   DNA DOUBLE STRANDED ANTIBODIES       Component Value Range    DNA-ds    0 - 29 IU/mL    Value: <15      Interpretation:  Negative       CT CHEST PULMONARY EMBOLISM W CONTRAST 5/20/2015 4:57 PM  HISTORY: Pain. SOB. Elevated d-dimer.   TECHNIQUE: 65 mL Isovue 370. Axial images with coronal  reconstructions.  COMPARISON: None.  FINDINGS: Calcified granuloma with surrounding scarring in the  posterolateral left upper lobe. Triangular shaped opacity at the right  lung base in the lateral right middle lobe could represent some  scarring, atelectasis or infiltrate. There is also some scarring or  atelectasis in the posteromedial right lung base. Lungs otherwise  clear.  The pulmonary arteries are well opacified. No CT evidence for acute  pulmonary embolus. No aortic dissection.  Diffuse fatty infiltration of the liver.  IMPRESSION  IMPRESSION:   1. No pulmonary embolus identified.  2. Small focus of atelectasis, infiltrate or scarring in the lateral  right middle lobe.  3. Old granulomatous disease.  4. Otherwise negative.  SILVERIO VAZQUEZ MD    Results for FAVIO MARTINEZ (MRN [id number]) as of 10/30/2015 17:00   Ref. Range 8/21/2012 09:06 5/22/2013 14:22 4/14/2014 12:06 9/11/2014 12:35 12/4/2014 16:38   TSH Latest Range: 0.40-4.00 mU/L 0.83 0.43 0.27 (L) 0.23 (L) 0.22 (L)     Component      Latest Ref Rng 10/30/2015   WBC      4.0 - 11.0 10e9/L 13.4 (H)   RBC Count      3.8 - 5.2 10e12/L 4.76   Hemoglobin      11.7 - 15.7 g/dL 12.4   Hematocrit      35.0 - 47.0 % 37.9   MCV      78 - 100 fl 80   MCH      26.5 - 33.0 pg 26.1 (L)   MCHC      31.5 - 36.5 g/dL 32.7   RDW      10.0 - 15.0 % 14.5   Platelet Count      150 - 450 10e9/L 324   Diff Method       Automated Method   % Neutrophils       67.7   % Lymphocytes       22.7   % Monocytes       6.3   % Eosinophils       2.7   % Basophils       0.4   % Immature Granulocytes       0.2   Absolute  Neutrophil      1.6 - 8.3 10e9/L 9.1 (H)   Absolute Lymphocytes      0.8 - 5.3 10e9/L 3.1   Absolute Monoctyes      0.0 - 1.3 10e9/L 0.9   Absolute Eosinophils      0.0 - 0.7 10e9/L 0.4   Absolute Basophils      0.0 - 0.2 10e9/L 0.1   Abs Immature Granulocytes      0 - 0.4 10e9/L 0.0   Creatinine      0.52 - 1.04 mg/dL 1.21 (H)   GFR Estimate      >60 mL/min/1.7m2 47 (L)   GFR Estimate If Black      >60 mL/min/1.7m2 57 (L)   Iron      35 - 180 ug/dL 70   Iron Binding Cap      240 - 430 ug/dL 428   Iron Saturation Index      15 - 46 % 16   Albumin      3.4 - 5.0 g/dL 4.6   ALT      0 - 50 U/L 29   AST      0 - 45 U/L 21   CRP Inflammation      0.0 - 8.0 mg/L <2.9   Sed Rate      0 - 20 mm/h 8   Vitamin D Deficiency screening      20 - 75 ug/L 46   Ferritin      8 - 252 ng/mL 18   TSH      0.40 - 4.00 mU/L 0.49   T4 Free      0.76 - 1.46 ng/dL 1.06   Free T3      2.3 - 4.2 pg/mL 2.8     Component      Latest Ref Rng 11/17/2015   Testosterone Total      8 - 60 ng/dL 19   Sex Hormone Binding Globulin      30 - 135 nmol/L 32   Free Testosterone Calculated      0.11 - 0.58 ng/dL 0.34   Vitamin A       0.61   Retinol Palmitate       <0.02 . . .   Vitamin A Interp       Normal . . .   Thyroglobulin Antibody      <40 IU/mL <20   Thyroid Peroxidase Antibody      <35 IU/mL 31   DHEA Sulfate      35 - 430 ug/dL 101   Zinc       68     Component      Latest Ref Rng 1/27/2016   Sodium      133 - 144 mmol/L 135   Potassium      3.4 - 5.3 mmol/L 4.0   Chloride      94 - 109 mmol/L 104   Carbon Dioxide      20 - 32 mmol/L 24   Anion Gap      3 - 14 mmol/L 7   Glucose      70 - 99 mg/dL 88   Urea Nitrogen      7 - 30 mg/dL 20   Creatinine      0.52 - 1.04 mg/dL 1.13 (H)   GFR Estimate      >60 mL/min/1.7m2 51 (L)   GFR Estimate If Black      >60 mL/min/1.7m2 62   Calcium      8.5 - 10.1 mg/dL 9.4   Bilirubin Total      0.2 - 1.3 mg/dL 0.7   Albumin      3.4 - 5.0 g/dL 4.3   Protein Total      6.8 - 8.8 g/dL 7.7   Alkaline  "Phosphatase      40 - 150 U/L 66   ALT      0 - 50 U/L 24   AST      0 - 45 U/L 18   Cholesterol      <200 mg/dL 112   Triglycerides      <150 mg/dL 100   HDL Cholesterol      >49 mg/dL 34 (L)   LDL Cholesterol Calculated      <100 mg/dL 58   Non HDL Cholesterol      <130 mg/dL 78   N-Terminal Pro Bnp      0 - 125 pg/mL 29   Hemoglobin A1C      4.3 - 6.0 % 7.0 (H)   Amylase      30 - 110 U/L 60   Lipase      73 - 393 U/L 394 (H)   Troponin I ES      0.000 - 0.045 ug/L <0.015 . . .     Component      Latest Ref Rng 2/24/2016   Angiotensin Converting Enzyme       <5 (L) . . .   Neutrophil Cytoplasmic IgG Antibody       <1:20 . . .   Sed Rate      0 - 20 mm/h 86 (H)     Copath Report      Patient Name: FAVIO MARTINEZ   MR#: [id number]   Specimen #: [id number]   Collected: 3/15/2016   Received: 3/15/2016   Reported: 3/17/2016 13:33   Ordering Phy(s): PARVEEN ENRIQUEZ     ORIGINAL REPORT FOLLOWS ADDENDUM  ADDENDUM     TO ORIGINAL REPORT   Status: Signed Out   Date Ordered:3/18/2016   Date Complete:3/18/2016   Date Reported:3/18/2016 12:06   Signed Out By: Marianne Godfrey MD     INTERPRETATION:   This addendum is done to incorporate the results of fungal (GMS) stains   done on the specimen.  Specimen is negative for fungal organisms.  The   original final diagnosis remains unchanged.     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  Negative for malignancy(Please   see microscopic description)     Electronically signed out by:     Marianne Godfrey MD     CLINICAL HISTORY:   right orbital mass     GROSS:   The specimen is received labeled \"right orbital biopsy\" and consists of   red-tan nodule measuring 1.5 x 0.9 x 0.6 cm with one smooth side and   opposite rough side consistent with periosteum.  The " specimen is   bisected revealing homogenous pale tan fleshy cut surface.  Touch   preparations are prepared, air dried and fixed, portion of specimen is   submitted in RPMI for possible lymphoma workup [facility],   ([facility], [place], [place] ).  The remainder is entirely submitted.   (Dictated by: Marianne Godfrey MD 3/15/2016 04:45 PM)     MICROSCOPIC:     Specimen consists of portion of lacrimal gland with acute and chronic   inflammation.  Focal area of microabscess formation associated with   small areas of necrosis are also present.  Inflammation is seen   extending to the periorbital adipose tissue forming panniculitis.   Specimen is negative for malignancy.  Samples sent for immunophenotyping   to [facility], ([facility] [place] [place] [facility], [place], [place] ) reveals no evidence   of monoclonality or aberrant antigen expression.  A GMS (fungal) stain   is pending and results will be reported as an addendum.     CPT Codes:   A: 12315-YN4, 21438-PDYDM, SOH, 66542-OXNHW, [address]642-UTKI     TESTING LAB LOCATION:   [address]  [address] [phone]     COLLECTION SITE:   Client: [facility]   Location: [facility] (S)            Component      Latest Ref Rng 4/29/2016   Nucleated RBCs      0 /100 0   Absolute Neutrophil      1.6 - 8.3 10e9/L 8.9 (H)   Absolute Lymphocytes      0.8 - 5.3 10e9/L 3.2   Absolute Monocytes      0.0 - 1.3 10e9/L 0.8   Absolute Eosinophils      0.0 - 0.7 10e9/L 0.2   Absolute Basophils      0.0 - 0.2 10e9/L 0.1   Abs Immature Granulocytes      0 - 0.4 10e9/L 0.1   Absolute Nucleated RBC       0.0   IGG      695 - 1620 mg/dL 836   IgG1      300 - 856 mg/dL 280 (L)   IgG2      158 - 761 mg/dL 277   IgG3      24 - 192 mg/dL 35   IgG4      11 - 86 mg/dL 16   RNP Antibody IgG      0.0 - 0.9 AI <0.2 . . .   Smith SUNNY Antibody IgG      0.0 - 0.9 AI <0.2 . . .   SSA (Ro) (SUNNY) Antibody, IgG      0.0 - 0.9 AI <0.2 . . .   SSB (La) (SUNNY)  Antibody, IgG      0.0 - 0.9 AI <0.2 . . .   Scleroderma Antibody Scl-70 SUNNY IgG      0.0 - 0.9 AI <0.2 . . .   Cholesterol      <200 mg/dL 154   Triglycerides      <150 mg/dL 220 (H)   HDL Cholesterol      >49 mg/dL 42 (L)   LDL Cholesterol Calculated      <100 mg/dL 67   Non HDL Cholesterol      <130 mg/dL 111   M Tuberculosis Result      NEG Negative   M Tuberculosis Antigen Value       0.00   Albumin      3.4 - 5.0 g/dL 4.3   ALT      0 - 50 U/L 30   AST      0 - 45 U/L 10   Complement C3      76 - 169 mg/dL 157   Complement C4      15 - 50 mg/dL 32   CRP Inflammation      0.0 - 8.0 mg/L <2.9   Sed Rate      0 - 20 mm/h 2   DNA-ds      <10 IU/mL 1   Cyclic Citrullinated Peptide Antibody, IgG      <7 U/mL 1   Rheumatoid Factor      <20 IU/mL <20   Proteinase 3 Antibody IgG      0.0 - 0.9 AI <0.2 . . .   Myeloperoxidase Antibody IgG      0.0 - 0.9 AI 2.5 (H)   Vitamin D Deficiency screening      20 - 75 ug/L 24   Hemoglobin A1C      4.3 - 6.0 % 7.9 (H)   ALLI by IFA IgG       1:40 (A) . . .     Component      Latest Ref Rng & Units 4/7/2017   WBC      4.0 - 11.0 10e9/L 11.3 (H)   RBC Count      3.8 - 5.2 10e12/L 4.77   Hemoglobin      11.7 - 15.7 g/dL 12.5   Hematocrit      35.0 - 47.0 % 38.7   MCV      78 - 100 fl 81   MCH      26.5 - 33.0 pg 26.2 (L)   MCHC      31.5 - 36.5 g/dL 32.3   RDW      10.0 - 15.0 % 13.2   Platelet Count      150 - 450 10e9/L 347   Diff Method       Automated Method   % Neutrophils      % 67.1   % Lymphocytes      % 22.9   % Monocytes      % 6.2   % Eosinophils      % 3.0   % Basophils      % 0.4   % Immature Granulocytes      % 0.4   Nucleated RBCs      0 /100 0   Absolute Neutrophil      1.6 - 8.3 10e9/L 7.5   Absolute Lymphocytes      0.8 - 5.3 10e9/L 2.6   Absolute Monocytes      0.0 - 1.3 10e9/L 0.7   Absolute Eosinophils      0.0 - 0.7 10e9/L 0.3   Absolute Basophils      0.0 - 0.2 10e9/L 0.1   Abs Immature Granulocytes      0 - 0.4 10e9/L 0.1   Absolute Nucleated RBC       0.0    IGG      695 - 1620 mg/dL 962   IgG1      300 - 856 mg/dL Test sent to [facility]. See ARMISC.   IgG2      158 - 761 mg/dL Test sent to [facility]. See ARMISC.   IgG3      24 - 192 mg/dL Test sent to [facility]. See ARMISC.   IgG4      11 - 86 mg/dL Test sent to [facility]. See ARMISC.   Result       SEE NOTE . . .   Test Name       IGG SUBCLASSES   Send Outs Misc Test Code       25755   Send Outs Misc Test Specimen       Serum   Creatinine      0.52 - 1.04 mg/dL 1.20 (H)   GFR Estimate      >60 mL/min/1.7m2 47 (L)   GFR Estimate If Black      >60 mL/min/1.7m2 57 (L)   Creatinine Urine      mg/dL    Albumin Urine mg/L      mg/L    Albumin Urine mg/g Cr      0 - 25 mg/g Cr    Myeloperoxidase Antibody IgG      0.0 - 0.9 AI 2.9 (H)   Proteinase 3 Antibody IgG      0.0 - 0.9 AI <0.2 . . .   Neutrophil Cytoplasmic IgG Antibody       1:80 (A) . . .   Angiotensin Converting Enzyme       <5 (L) . . .   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      Comparison: MRI brain 5/29/2013      Technique:   Orbits: Axial and coronal T1-weighted, and coronal T2-weighted images  obtained without intravenous contrast. Post-intravenous contrast  (using gadolinium) sagittal FLAIR, were obtained with fat-saturation,  focused on the orbits.  Brain: Axial susceptibility-weighted and FLAIR sequences were obtained  of the whole brain without intravenous contrast, and postcontrast  axial T1-weighted images were obtained through  the whole brain.   Contrast: 7.5mL Gadavist     Findings:  New asymmetric enlargement of the right lacrimal gland and enhancement  of the right lacrimal gland with surrounding ill-defined crescentic T2  hyperintense signal and enhancement within the right superior  superotemporal orbit, predominantly involving the extraconal space  with slight intraconal extension. No definite associated restricted  diffusion. No discrete extraocular muscle enlargement. Normal  symmetric optic nerve signal. Cavernous sinuses and orbital apices are  normal. Globes are normal.     Whole brain images are symmetric minimal leukoaraiosis and generalized  parenchymal volume loss. Ventricles are not enlarged. No intracranial  hemorrhage, mass effect, midline shift or abnormal extra axial fluid  collection. No abnormal foci of intracranial enhancement or restricted  diffusion. Paranasal sinuses and mastoid air cells are clear.            Impression:    New abnormal enlargement of the right lacrimal gland with surrounding  ill-defined T2 hyperintense signal and enhancement centered in the  superotemporal right orbital fat, which may represent   infectious/inflammatory dacryadenitis, orbital pseudotumor, lymphoma,  carcinoma, sarcoidosis or IgG4 disease..     I have personally reviewed the examination and initial interpretation  and I agree with the findings.     PATRICIA BRANTLEY MD      Component      Latest Ref Rng & Units 6/1/2017   WBC      4.0 - 11.0 10e9/L 12.0 (H)   RBC Count      3.8 - 5.2 10e12/L 5.18   Hemoglobin      11.7 - 15.7 g/dL 13.8   Hematocrit      35.0 - 47.0 % 41.0   MCV      78 - 100 fl 79   MCH      26.5 - 33.0 pg 26.6   MCHC      31.5 - 36.5 g/dL 33.7   RDW      10.0 - 15.0 % 14.8   Platelet Count      150 - 450 10e9/L 322   Diff Method       Automated Method   % Neutrophils      % 76.7   % Lymphocytes      % 16.5   % Monocytes      % 4.6   % Eosinophils      % 1.9   % Basophils      % 0.3   Absolute Neutrophil       "1.6 - 8.3 10e9/L 9.2 (H)   Absolute Lymphocytes      0.8 - 5.3 10e9/L 2.0   Absolute Monocytes      0.0 - 1.3 10e9/L 0.6   Absolute Eosinophils      0.0 - 0.7 10e9/L 0.2   Absolute Basophils      0.0 - 0.2 10e9/L 0.0   Color Urine       Yellow   Appearance Urine       Clear   Glucose Urine      NEG mg/dL Negative   Bilirubin Urine      NEG Negative   Ketones Urine      NEG mg/dL Negative   Specific Gravity Urine      1.003 - 1.035 1.010   pH Urine      5.0 - 7.0 pH 5.5   Protein Albumin Urine      NEG mg/dL Negative   Urobilinogen Urine      0.2 - 1.0 EU/dL 0.2   Nitrite Urine      NEG Negative   Blood Urine      NEG Negative   Leukocyte Esterase Urine      NEG Negative   Source       Midstream Urine   WBC Urine      0 - 2 /HPF O - 2   RBC Urine      0 - 2 /HPF O - 2   Squamous Epithelial /LPF Urine      FEW /LPF Few   Bacteria Urine      NEG /HPF Few (A)   Creatinine      0.52 - 1.04 mg/dL 1.19 (H)   GFR Estimate      >60 mL/min/1.7m2 48 (L)   GFR Estimate If Black      >60 mL/min/1.7m2 58 (L)   Protein Random Urine      g/L <0.05   Protein Total Urine g/gr Creatinine      0 - 0.2 g/g Cr Unable to calculate due to low value   Myeloperoxidase Antibody IgG      0.0 - 0.9 AI 1.9 (H)   Proteinase 3 Antibody IgG      0.0 - 0.9 AI <0.2 . . .   ALT      0 - 50 U/L 29   AST      0 - 45 U/L 18   Albumin      3.4 - 5.0 g/dL 4.6   CRP Inflammation      0.0 - 8.0 mg/L 6.1   Sed Rate      0 - 30 mm/h 13   Creatinine Urine Random      mg/dL 54   Neutrophil Cytoplasmic IgG Antibody       <1:20 . . .   Creatinine Urine      mg/dL 54       Patient Name: FAVIO MARTINEZ   MR#: [id number]   Specimen #: [id number]   Collected: 8/4/2017   Received: 8/4/2017   Reported: 8/9/2017 16:19   Ordering Phy(s): CRISTHIAN FLORES     For improved result formatting, select 'View Enhanced Report Format'   under Linked Documents section.     SPECIMEN(S):   Eye, right lacrimal gland     FINAL DIAGNOSIS:   Eye, right, \"lacrimal gland\", biopsy   - Dense " fibrosis and patchy inflammation   - No residual lacrimal gland seen     COMMENT:   Sections show tissue involved by dense fibrosis and patchy inflammation.   There is no morphologic or immunohistochemical evidence of lymphoma. By   separate report, concurrent flow cytometry studies ([id number]),   performed at the [facility], show polytypic B cells and no   aberrant immunophenotype on T cells. Immunohistochemical stains for IgG   and IgG-4 were performed, which provide no support for IgG-4-related   disease. Some of these changes may be related to prior surgery. Sjögren   disease is not excluded. There is no evidence of malignancy. Dr. Max Conway has reviewed this case and concurs.     Electronically signed out by:     Antonella Beth M.D.   INDICATION:  Right eye edema. Reported history of right orbital biopsy yielding pathology consistent with idiopathic inflammation.    TECHNIQUE:  Noncontrast CT images were obtained through the brain and facial bones.    COMPARISON:  CT sinus 03/27/2017, MRI brain and orbits 02/15/2016.     FINDINGS:  CT facial bones:   Large soft tissue lesion centered within the lateral intraconal and extraconal right orbit has significantly increased in size compared to the CT dated 03/27/2017. The lesion is inseparable from the right superior, lateral, and inferior rectus muscles, as well as the right lacrimal gland. The lesion demonstrates extension posteriorly slightly proximal to the orbital apex, as well as extension into the medial orbit superiorly and anteriorly. Mass effect includes medial bowing of the optic nerve sheath complex and pronounced proptosis of the right globe.  No mass is identified in the right orbit.  Torus palatinus.   Minimal mucosal thickening in the ethmoid air cells. The remainder of the visualized paranasal sinuses are clear.  CT brain:  The ventricles and sulci within normal limits for patient age. No mass effect or midline shift. The  gray-white differentiation is maintained.  No acute intracranial hemorrhage or pathologic extra-axial fluid collection.  The calvarium is intact. The mastoid air cells are clear.    IMPRESSION:  1. Large soft tissue lesion centered within the lateral intraconal and extraconal right orbit has significantly increased in size compared to the CT dated 03/27/2017. The lesion is inseparable from the right lacrimal gland and rectus musculature. Mass effect includes medial bowing of the optic nerve sheath complex and pronounced proptosis of the right globe. Given provided history, findings may represent a progressive inflammatory etiology (pseudotumor). Other differential considerations, such as sarcoidosis or lymphoma, could also have this appearance.  2. No acute intracranial hemorrhage or intracranial mass effect.    Please note that all CT scans at this facility use dose modulation, iterative reconstruction, and/or weight-based dosing when appropriate to reduce radiation dose to as low as reasonably achievable.    Dictated by Charles Ward MD @ Jul 16 2018  3:54PM    Signed by Dr. Charles Ward @ Jul 16 2018  4:20PM    CT ORBITAL WO CONTRAST 7/11/2019 3:42 PM     History: orbital pseudotumor; Subjective visual disturbance; Visual  field defect; Idiopathic orbital inflammatory syndrome  ICD-10: Subjective visual disturbance; Visual field defect; Idiopathic  orbital inflammatory syndrome     Comparison: CT 3/6/2019 and 7/16/2018, MRI brain 5/9/2017                                                                   Impression:   1. 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  2. Slightly decreased right orbital proptosis since 3/6/2019.  Component      Latest Ref Rng & Units 10/29/2019   WBC      4.0 - 11.0 10e9/L 11.6 (H)   RBC Count       3.8 - 5.2 10e12/L 4.73   Hemoglobin      11.7 - 15.7 g/dL 12.3   Hematocrit      35.0 - 47.0 % 39.1   MCV      78 - 100 fl 83   MCH      26.5 - 33.0 pg 26.0 (L)   MCHC      31.5 - 36.5 g/dL 31.5   RDW      10.0 - 15.0 % 13.8   Platelet Count      150 - 450 10e9/L 371   Diff Method       Automated Method   % Neutrophils      % 75.6   % Lymphocytes      % 14.8   % Monocytes      % 5.3   % Eosinophils      % 3.5   % Basophils      % 0.5   % Immature Granulocytes      % 0.3   Nucleated RBCs      0 /100 0   Absolute Neutrophil      1.6 - 8.3 10e9/L 8.8 (H)   Absolute Lymphocytes      0.8 - 5.3 10e9/L 1.7   Absolute Monocytes      0.0 - 1.3 10e9/L 0.6   Absolute Eosinophils      0.0 - 0.7 10e9/L 0.4   Absolute Basophils      0.0 - 0.2 10e9/L 0.1   Abs Immature Granulocytes      0 - 0.4 10e9/L 0.0   Absolute Nucleated RBC       0.0   Color Urine       Yellow   Appearance Urine       Clear   Glucose Urine      NEG:Negative mg/dL 50 (A)   Bilirubin Urine      NEG:Negative Negative   Ketones Urine      NEG:Negative mg/dL 5 (A)   Specific Gravity Urine      1.003 - 1.035 1.023   Blood Urine      NEG:Negative Negative   pH Urine      5.0 - 7.0 pH 5.0   Protein Albumin Urine      NEG:Negative mg/dL Negative   Urobilinogen mg/dL      0.0 - 2.0 mg/dL 0.0   Nitrite Urine      NEG:Negative Negative   Leukocyte Esterase Urine      NEG:Negative Negative   Source       Unspecified Urine   WBC Urine      0 - 5 /HPF <1   RBC Urine      0 - 2 /HPF 2   Mucous Urine      NEG:Negative /LPF Present (A)   Hyaline Casts      0 - 2 /LPF 5 (H)   IGG      695 - 1,620 mg/dL 682 (L)   IGA      70 - 380 mg/dL 238   IGM      60 - 265 mg/dL 46 (L)   Creatinine      0.52 - 1.04 mg/dL 1.04   GFR Estimate      >60 mL/min/1.73:m2 61   GFR Estimate If Black      >60 mL/min/1.73:m2 71   Neutrophil Cytoplasmic Antibody      <1:10 titer <1:10   Neutrophil Cytoplasmic Antibody Pattern       The ANCA IFA is <1:10.  No further testing will be performed.   CD19 B  Cells      6 - 27 % <1 (L)   Absolute CD19      107 - 698 cells/uL <1 (L)   Protein Random Urine      g/L 0.26   Protein Total Urine g/gr Creatinine      0 - 0.2 g/g Cr 0.13   Myeloperoxidase Antibody IgG      0.0 - 0.9 AI 1.2 (H)   Proteinase 3 Antibody IgG      0.0 - 0.9 AI <0.2   Magnesium      1.6 - 2.3 mg/dL 1.7   Ferritin      8 - 252 ng/mL 67   Vitamin B12      193 - 986 pg/mL 592   Vitamin D Deficiency screening      20 - 75 ug/L 29   Sed Rate      0 - 30 mm/h 10   CRP Inflammation      0.0 - 8.0 mg/L <2.9   Albumin      3.4 - 5.0 g/dL 4.3   ALT      0 - 50 U/L 26   AST      0 - 45 U/L 17   Creatinine Urine      mg/dL 207     Component      Latest Ref Rng & Units 12/5/2019   WBC      4.0 - 11.0 10e9/L    RBC Count      3.8 - 5.2 10e12/L    Hemoglobin      11.7 - 15.7 g/dL    Hematocrit      35.0 - 47.0 %    MCV      78 - 100 fl    MCH      26.5 - 33.0 pg    MCHC      31.5 - 36.5 g/dL    RDW      10.0 - 15.0 %    Platelet Count      150 - 450 10e9/L    Diff Method          % Neutrophils      %    % Lymphocytes      %    % Monocytes      %    % Eosinophils      %    % Basophils      %    % Immature Granulocytes      %    Nucleated RBCs      0 /100    Absolute Neutrophil      1.6 - 8.3 10e9/L    Absolute Lymphocytes      0.8 - 5.3 10e9/L    Absolute Monocytes      0.0 - 1.3 10e9/L    Absolute Eosinophils      0.0 - 0.7 10e9/L    Absolute Basophils      0.0 - 0.2 10e9/L    Abs Immature Granulocytes      0 - 0.4 10e9/L    Absolute Nucleated RBC          Color Urine          Appearance Urine          Glucose Urine      NEG:Negative mg/dL    Bilirubin Urine      NEG:Negative    Ketones Urine      NEG:Negative mg/dL    Specific Gravity Urine      1.003 - 1.035    Blood Urine      NEG:Negative    pH Urine      5.0 - 7.0 pH    Protein Albumin Urine      NEG:Negative mg/dL    Urobilinogen mg/dL      0.0 - 2.0 mg/dL    Nitrite Urine      NEG:Negative    Leukocyte Esterase Urine      NEG:Negative    Source          WBC  Urine      0 - 5 /HPF    RBC Urine      0 - 2 /HPF    Mucous Urine      NEG:Negative /LPF    Hyaline Casts      0 - 2 /LPF    IGG      695 - 1,620 mg/dL 616 (L)   IGA      70 - 380 mg/dL 214   IGM      60 - 265 mg/dL 33 (L)   Creatinine      0.52 - 1.04 mg/dL    GFR Estimate      >60 mL/min/1.73:m2    GFR Estimate If Black      >60 mL/min/1.73:m2    Neutrophil Cytoplasmic Antibody      <1:10 titer    Neutrophil Cytoplasmic Antibody Pattern          CD19 B Cells      6 - 27 % <1   Absolute CD19      107 - 698 cells/uL <1   Protein Random Urine      g/L    Protein Total Urine g/gr Creatinine      0 - 0.2 g/g Cr    Myeloperoxidase Antibody IgG      0.0 - 0.9 AI    Proteinase 3 Antibody IgG      0.0 - 0.9 AI    Magnesium      1.6 - 2.3 mg/dL    Ferritin      8 - 252 ng/mL    Vitamin B12      193 - 986 pg/mL    Vitamin D Deficiency screening      20 - 75 ug/L    Sed Rate      0 - 30 mm/h    CRP Inflammation      0.0 - 8.0 mg/L    Albumin      3.4 - 5.0 g/dL    ALT      0 - 50 U/L    AST      0 - 45 U/L    Creatinine Urine      mg/dL        ROS:  A comprehensive ROS was done, positives are per HPI.        HISTORY REVIEW:  Past Medical History:   Diagnosis Date     Abnormal glandular Papanicolaou smear of cervix 1992     Allergic rhinitis     Allergy, airborne subst     Arthritis      ASCVD (arteriosclerotic cardiovascular disease)      Chronic pain      Chronic pancreatitis (H)     idiopathic, Tx: PPI, antioxidants, pancreatic enzymes     Common migraine      Coronary artery disease      Costochondritis      Difficulty in walking(719.7)      Dyspnea on exertion      Ectasia, mammary duct     [facility], persistent nipple discharge     Elevated fasting glucose      Gastro-oesophageal reflux disease      Hernia      History of angina      Hyperlipidemia LDL goal < 100      Hypertension goal BP (blood pressure) < 140/90     Essential hypertension     Iron deficiency anemia      Ischemic cardiomyopathy       Menorrhagia      Migraine headaches      Mild persistent asthma      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 Matias, neg w/u, Rx'd w/prednisone     Ventral hernia, unspecified, without mention of obstruction or gangrene 2012     Past Surgical History:   Procedure Laterality Date     C ECHO HEART XTHORACIC,COMPLETE, W/O DOPPLER  04    [facility] WNL, EF 60%     C/SECTION, LOW TRANSVERSE           CARDIAC SURGERY      cardiac stent- recent in 16; 4 other stents     DILATION AND CURETTAGE N/A 2016    Procedure: DILATION AND CURETTAGE;  Surgeon: Nahed Butler MD;  Location: [facility]     HC UGI ENDOSCOPY W EUS  08    Dr. Pastrana, possible chronic pancreatitis, fatty liver     HERNIA REPAIR  2012    done [place] [facility] [place]     INSERT INTRAUTERINE DEVICE N/A 2016    Procedure: INSERT INTRAUTERINE DEVICE;  Surgeon: Nahed Butler MD;  Location: [facility]     INT UTERINE FIBRIOD EMBOLIZATION  10/29/2014     LAPAROSCOPIC CHOLECYSTECTOMY  08    Dr. Arnol GRUBBS TX, CERVICAL      s/p LEEP     ORBITOTOMY Right 3/15/2016    Procedure: ORBITOTOMY;  Surgeon: Myron Cyr MD;  Location: [facility]     ORBITOTOMY Right 2017    Procedure: ORBITOTOMY;  RIGHT ORBITOTOMY AND BIOPSY;  Surgeon: Charis Holbrook MD;  Location: [facility]     REPAIR  PTOSIS Right 2017    Procedure: REPAIR PTOSIS;  RIGHT UPPER LID PTOSIS REPAIR;  Surgeon: Myron Cyr MD;  Location: [facility]     UPPER GI ENDOSCOPY  10/21/08    mild gastritis, Dr. Hidalgo     Family History   Problem Relation Age of Onset     Heart Disease Father 50        heart attack     Cerebrovascular Disease Father      Diabetes Father      Hypertension Father      Depression Father      C.A.D. Father      Hypertension Mother      Arthritis Mother      Heart Disease Mother         long qt syndrome     Thyroid Disease Mother      C.A.D. Mother      Heart Disease Brother 15        MI at 15, 16.      Diabetes Maternal Uncle      Hypertension Maternal Aunt      Hypertension Maternal Uncle      Arthritis Brother         he passed away, had severe arthritis at age 11     Arthritis Maternal Uncle      Eye Disorder Maternal Uncle         cataracts     Neurologic Disorder Sister         migraines     Neurologic Disorder Sister         migraines     Respiratory Son         asthma     Cerebrovascular Disease Maternal Uncle      C.A.D. Brother      Family History Negative Other         neg for RA, SLE     Unknown/Adopted No family hx of         unknown neurological issues in her family, mother was adopted     Skin Cancer No family hx of         No known family hx of skin cancer     Social History     Socioeconomic History     Marital status: Single     Spouse name: Not on file     Number of children: 1     Years of education: Not on file     Highest education level: Not on file   Occupational History     Employer: NONE    Social Needs     Financial resource strain: Not on file     Food insecurity:     Worry: Not on file     Inability: Not on file     Transportation needs:     Medical: Not on file     Non-medical: Not on file   Tobacco Use     Smoking status: Current Every Day Smoker     Packs/day: 0.20     Years: 1.00     Pack years: 0.20     Types: Cigarettes     Last attempt to quit: 2016      Years since quittin.0     Smokeless tobacco: Never Used   Substance and Sexual Activity     Alcohol use: No     Alcohol/week: 0.0 standard drinks     Drug use: No     Sexual activity: Not Currently   Lifestyle     Physical activity:     Days per week: Not on file     Minutes per session: Not on file     Stress: Not on file   Relationships     Social connections:     Talks on phone: Not on file     Gets together: Not on file     Attends [employer] service: Not on file     Active member of club or organization: Not on file     Attends meetings of clubs or organizations: Not on file     Relationship status: Not on file     Intimate partner violence:     Fear of current or ex partner: Not on file     Emotionally abused: Not on file     Physically abused: Not on file     Forced sexual activity: Not on file   Other Topics Concern     Parent/sibling w/ CABG, MI or angioplasty before 65F 55M? Yes   Social History Narrative    Balanced Diet - sometimes    Osteoporosis Prevention Measures - Dairy servings per day: 2 servings weekly    Regular Exercise -  Yes Describe walking 4 times a week    Dental Exam - NO    Seatbelts used - Yes    Self Breast Exam - Yes    Abuse: Current or Past (Physical, Sexual or Emotional)- No    Do you have any concerns about STD's -  No    Do you feel safe in your environment - No    Guns stored in the home - No    Sunscreen used - Yes    Lipids -  YES - Date:     Glucose -  YES - Date:     Eye Exam - YES - Date: one year ago    Colon Cancer Screening - No    Hemoccults - NO    Pap Test -  YES - Date: , remote history of LEEP    Mammography - YES - Date: last spring, would like to discuss, needs [facility]    DEXA - NO    Immunizations reviewed and up to date - Yes, last td given in  or      Patient Active Problem List   Diagnosis     Seasonal affective disorder (H)     Allergic rhinitis     PCOS (polycystic ovarian syndrome)     Moderate  persistent asthma     Chronic pancreatitis (H)     Hypertension goal BP (blood pressure) < 140/90     Common migraine     NSTEMI (non-ST elevated myocardial infarction) (H)     Hyperlipidemia LDL goal <70     ASCVD (arteriosclerotic cardiovascular disease)     GERD (gastroesophageal reflux disease)     Ischemic cardiomyopathy     Hypertensive heart disease     Uterine leiomyoma     Iron deficiency anemia     Costochondritis     Vitamin D deficiency     Breast cancer screening     Spinal stenosis in cervical region     Fibromyalgia     Seronegative rheumatoid arthritis (H)     Type 2 diabetes, HbA1C goal < 8% (H)     Type 2 diabetes mellitus with other specified complication (H)     Hyperlipidemia associated with type 2 diabetes mellitus (H)     Hypertension associated with diabetes (H)     Overweight with body mass index (BMI) 25.0-29.9     Tobacco use disorder     Telogen effluvium     CAD S/P percutaneous coronary angioplasty     Status post coronary angiogram     ANCA-associated vasculitis (H)     Wegener's vasculitis (H)     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 All misscarriages were in first trimester. H/o OC use in the past. 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    PMHx, FHx, SHx were reviewed, unchanged.      Outpatient Encounter Medications as of 2020   Medication Sig Dispense Refill     acetaminophen (TYLENOL) 325 MG tablet Take 1-2 tablets (325-650 mg) by mouth every 6 hours as needed for pain (headache) 250 tablet 0     albuterol (2.5 MG/3ML) 0.083% neb solution INL 1 VIAL VIA NEBULIZATION Q 4 TO 6 HOURS PRN  1     albuterol (PROAIR HFA, PROVENTIL HFA, VENTOLIN HFA) 108 (90 BASE) MCG/ACT inhaler Inhale 2 puffs into the lungs every 6 hours as needed for shortness of breath / dyspnea or wheezing 3 Inhaler 1     aspirin (ASPIRIN LOW DOSE) 81 MG EC tablet Take 1 tablet (81 mg) by mouth daily 30 tablet 11     blood  glucose monitoring (NO BRAND SPECIFIED) meter device kit Use to test blood sugar 4 X times daily or as directed. (Patient taking differently: 1 kit Use to test blood sugar 4 X times daily or as directed.) 1 kit 0     blood glucose monitoring (NO BRAND SPECIFIED) test strip 1 strip by In Vitro route 4 times daily Test as directed. Please dispense three months and three months of refills. Thank you. (Patient taking differently: 1 strip by In Vitro route 4 times daily Test as directed. Please dispense three months and three months of refills. Thank you.) 360 each 3     Blood Pressure Monitor KIT 1 each daily Monitor home blood pressure as instructed by physician.  Dispense [place] blood pressure kit. 1 kit 0     calcium carbonate (TUMS) 500 MG chewable tablet Take 3-4 chew tab by mouth daily as needed.       clopidogrel (PLAVIX) 75 MG tablet Take 1 tablet (75 mg) by mouth daily 30 tablet 11     COMPRESSION STOCKINGS 2 each daily Apply one 10-15 mmHg compression stocking to each lower extgmierty during the day and remove before bedtime. 4 each 2     cyclobenzaprine (FLEXERIL) 10 MG tablet Take 1 tablet (10 mg) by mouth 2 times daily as needed for muscle spasms 60 tablet 2     cycloSPORINE (RESTASIS) 0.05 % ophthalmic emulsion Place 1 drop into both eyes 2 times daily 60 each 11     cycloSPORINE (RESTASIS) 0.05 % ophthalmic emulsion Place 1 drop into the right eye every 12 hours       desonide (DESOWEN) 0.05 % cream Apply topically 2 times daily       diclofenac (VOLTAREN) 1 % topical gel Apply 2 g topically 4 times daily Apply to affected joints 100 g 3     dicyclomine (BENTYL) 20 MG tablet TAKE 1 TABLET(20 MG) BY MOUTH FOUR TIMES DAILY AS NEEDED. Pls schedule clinic appt for refills. 60 tablet 0     diphenhydrAMINE (BENADRYL) 25 MG capsule TK 1 TO 2 CS PO QHS  4     EPIPEN 2-RIKY 0.3 MG/0.3ML injection INJECT 0.3 MG INTO THE MUSCLE PRF ANAPHYALAXIS  0     ferrous gluconate (FERGON) 324 (38 Fe) MG tablet Take 1 tablet (324  mg) by mouth 2 times daily (with meals) DO NOT CRUSH. Absorbed best on an empty stomach. If stomach upset occurs, can take with meals. For additional refills, please schedule a follow-up appointment with Dr Solano at [phone] 180 tablet 0     fexofenadine (ALLEGRA) 180 MG tablet Take 1 tablet by mouth daily as needed. 90 tablet 3     fluocinolone (SYNALAR) 0.01 % solution Apply topically daily as needed       fluticasone-vilanterol (BREO ELLIPTA) 100-25 MCG/INH inhaler Inhale 1 puff into the lungs daily       hydroxychloroquine (PLAQUENIL) 200 MG tablet Take 2 tablets (400 mg) by mouth daily 180 tablet 1     insulin pen needle (BD MARCK U/F) 32G X 4 MM USE DAILY OR AS DIRECTED 300 each 3     ketoconazole (NIZORAL) 2 % shampoo Apply topically daily as needed       Ketoprofen POWD 1 g 2 times daily as needed (prn pain) 500 g 3     lidocaine (LMX4) 4 % external cream Apply topically once as needed for mild pain (prn pain) 30 g 3     lifitegrast (XIIDRA) 5 % opthalmic solution Place 1 drop into both eyes 2 times daily 20 each 3     lisinopril-hydrochlorothiazide (PRINZIDE/ZESTORETIC) 20-25 MG tablet Take 1 tablet by mouth daily 30 tablet 11     Magnesium Oxide -Mg Supplement 250 MG TABS TK 1 T PO BID. REDUCE IF STOOLS LOOSEN  11     metFORMIN (GLUCOPHAGE-XR) 500 MG 24 hr tablet TAKE 2 TABLETS(1000 MG) BY MOUTH DAILY WITH DINNER (Patient taking differently: Take 2,000 mg by mouth daily ) 180 tablet 0     metoclopramide (REGLAN) 10 MG tablet Take 1 tablet (10 mg) by mouth 4 times daily (before meals and nightly) 90 tablet 0     metoprolol succinate ER (TOPROL-XL) 100 MG 24 hr tablet Take 1 tablet (100 mg) by mouth daily 30 tablet 11     metroNIDAZOLE (NORITATE) 1 % cream Apply topically daily       montelukast (SINGULAIR) 10 MG tablet Take 1 tablet (10 mg) by mouth At Bedtime 30 tablet 1     Multiple Vitamin (DAILY-GERALD) TABS Take 1 tablet by mouth daily  0     nitroGLYcerin (NITROSTAT) 0.4 MG sublingual tablet Place  1 tablet (0.4 mg) under the tongue every 5 minutes as needed for chest pain . After 3 doses, if pain persists call 911. 25 tablet 3     nystatin (MYCOSTATIN) 942388 UNITS TABS tablet TK 2 TS PO BID  0     ondansetron (ZOFRAN-ODT) 8 MG ODT tab Take 1 tablet (8 mg) by mouth every 8 hours as needed for nausea 60 tablet 1     pantoprazole (PROTONIX) 40 MG EC tablet Take 1 tablet (40 mg) by mouth daily Pork free tablets. (Patient taking differently: Take 40 mg by mouth as needed Pork free tablets.) 30 tablet 1     pravastatin (PRAVACHOL) 40 MG tablet Take 1 tablet (40 mg) by mouth daily 30 tablet 5     spironolactone (ALDACTONE) 50 MG tablet TAKE 1 TABLET BY MOUTH EVERY DAY TAKE ADDITIONAL 1/2 TABLET BY MOUTH EVERY DAY AS NEEDED FOR WEIGHT GAIN 30 tablet 6     sucralfate (CARAFATE) 1 GM tablet Take 1 tablet (1 g) by mouth 4 times daily 360 tablet 0     traMADol (ULTRAM) 50 MG tablet Take 1 tablet (50 mg) by mouth every 8 hours as needed for moderate pain 60 tablet 0     Triamcinolone Acetonide (NASACORT ALLERGY 24HR NA)        vitamin D2 (ERGOCALCIFEROL) 38884 units (1250 mcg) capsule Take 1 capsule (50,000 Units) by mouth every 7 days 12 capsule 0     folic acid (FOLVITE) 1 MG tablet Take 1 tablet by mouth daily (Patient not taking: Reported on 10/29/2019) 90 tablet 3     ranitidine (ZANTAC) 150 MG tablet Take 1 tablet (150 mg) by mouth 2 times daily (Patient not taking: Reported on 10/29/2019) 180 tablet 1     No facility-administered encounter medications on file as of 2/21/2020.        Allergies   Allergen Reactions     Amoxicillin-Pot Clavulanate      Augmentin      Unknown possible hives and edema     Azithromycin      Diatrizoate Other (See Comments)     Pt wants this listed because she is allergic to shellfish      Imitrex [Sumatriptan]      Severe face/neck/chest tightness and flushing side effects      Penicillins Hives     Unknown      Pork Allergy      Stomach pain, cramping, diarrhea, itching, nausea and  headaches     Shellfish Allergy Hives and Swelling     Sulfa Drugs Hives and Swelling     Zithromax [Azithromycin Dihydrate] Swelling     Unknown          Ph.E:    Vitals:    02/21/20 0941   BP: 107/71   BP Location: Left arm   Patient Position: Sitting   Cuff Size: Adult Regular   Pulse: 87   SpO2: 98%   Weight: 78.3 kg (172 lb 11.2 oz)           Constitutional: WD/WN. Pleasant. In no acute distress.   Eyes: Swelling around R eye significantly improved, not clear swelling today. EOMI.   HEENT: No oral ulcers or thrush. Normal salivary pool.  Lymph: No cervical, supraclavicular, or axillary LAD.  Chest: Clear to auscultation bilaterally, Normal work of breathing.  CV: RRR, no murmurs/ rubs or gallops. No edema, clubbing or cyanosis.   GI: Abdomen is soft and non tender.  MS: No synovitis or joint tenderness. Cool joints. No joint deformities. Full ROM of the joints. No nodules.   Skin: No rashes or lesions noted. No malar rash.  Neuro: A&O x 3. Grossly non focal, muscular power 5/5 in all ext  Psych: NL affect and mood    Assessment/ plan:    1-Seropositive non-erosive RA (arthritis, AM stiffness, high CRP, RF 26 but re-check neg 3/2015 on HCQ) Dx 5/2013, FMS, new pleuritic CP 3/20-15-5/2015 resolved on steroids. GPA. She is on  mg bid since 5/2014. She tolerates it well and it's helping some but not enough to control all her pain. Continues having flare ups of joint pain, swelling also has FMS. Joint sx are getting worse. Had high ESR/CRP in 3/2015 and new pleuritic CP between 3/2015-5/2015 which resolved after taking medrol dose pack prescribed 5/20/2015. Her pleuritic CP could be related to her RA. Then stopped tramadol as it blames it for recent episodes of CP.    In the past, MTX was recommended, she declined.    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 Etiology of mass unclear, but it's inflammatory and related to pt's underlying autoimmune disease (inflammatory arthritis, serositis). 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    Her work up showed borderline + ALLI and slightly elevated MPO. I told her prednisone is not good for her diabetes and weight gain. Recommended a trial of MTX as next step. Discussed risks on details. I called her neuro-ophthalmologist at last visit who agreed with trial of MTX (she suspects pseudo-sarcoidosis or sarcoid like disease but no granuloma and ACE not elevated).     Unfortunately despite all my efforts to explain risks vs benefits (more than risks) of MTX as steroid sparing gent, Janine declined to try MTX. I was very concerned about her R orbital inflammatory mass as there is high chance of flare up off steroids and steroid causes her weigh gain and uncontrolled diabetes. I even offered her other steroid sparing gents like imuran. She declined that as well.    Her inflammation around R orbit has recurred now. On 4/7/2017, I spent quite amount of time discussing a trial of MTX. After discussing risks/benefits, she agreed to try it.     She is s/p Tx with MTX 10 mg po qwk 4/2017-5/2017. No benefits and more GI SEs, more hair loss and headaches since start of MTX. No benefits. I called and spoke with her neuro-ophthalmologist who recommended a second opinion from neuro-ophthalmology at the . I suspect her presentation to be ANCA vasculitis related but wanted to repeat imaging and get neuro-ophthalmology eval here. She was recommended to have repeat biopsy to r/o lymphoma.    In 5/2017, prescribed her prednisone 40-30-20-10 mg a day each for 3 days then stop (she declined higher dose and longer taper despite my concern for worsening swelling around orbit), Her R campos-orbital edema significantly improved. MTX was stopped in 5/2017 given side effects. Plan  was to start imuran 50 mg po qd (NL TPMT); however we decided to hold off till she gets biopsy done.    Repeat R lacrimal gland biopsy in 8/2017 showed non specific inflammation, it ruled out lymphoma. Her R orbital swelling is improved but still present. After her biopsy I contacted her to schedule a f/u visit with me to discuss plan of care but she declined till today's visit.    She did not tolerate AZA because of GI SEs.    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     This is definitely an inflammatory process and is steroid responsive, she had local kenalog inj in 8/2017 at the time of biopsy which has helped. Given her diabetes and long term SE of prednisone, highly recommend steroid sparing agent to prevent progression/recurrence of R campos-orbital swelling. Since she did not tolerate MTX and AZA, recommend rituximab as next step.     In 2/2018, I spent 30 minutes discussing test results, plan of care, rituximab SEs including rare risk of PML. She declined rituximab with concern for SEs.    Unfortunately lost f/u sine 2/2018. In 9/2018, was back with her R eye being much worse, swelling around R orbit was severe and is pushing down her eye. She decided to see an ophthalmologist at [place], was seen 8/29, was told to have GPA.    Janine is frustrated with her eye condition, saying nobody told her that she has GPA or Wegener's which is a serious condition and people could die from it.    I reminded her that I discussed the Dx of ANCA-vasculitis which is just another name for Wegener's with her many times but she always declined induction Tx rituximab at last visit in 9/2018. I put her on prednisone 30 mg every day in 9/2018, now she is off, stopped 6 wk after surgery. Does not like SEs  including worsening BG.    CT chest/abdomen/pelvis 4/2017 was neg for malignancy. 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 Repeat MPO was positive in 6/2017.    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 Shah and Dr. Valdze at [place]. 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 Not happy with results, on my exam, her eye swelling/pain significantly improved. She wants to transfer care to here, I advised her to make an appointment with Dr. Saulo GREEN for f/u and referred her to Dr. Vasquez to assess if she has sinus involvement by GPA given facial pain.    After my original recommendation to receive rituximab (FDA approved for GPA) in 2/2018. She finally agreed to it, had 1 gr q2wk x 2 on 12/12/2018 and 12/26/2018. Had minor allergic reaction which was managed by extra dose of steroid and benadryl. She refuses to do prednisone taper given h/o diabetes, GIB on prednisone. I told her it would take 1 mo for rituximab to show benefit, if she continues to have active disease, recommend trial of cytoxan.     Patient received Rituxan again (6/3/19 & 6/17/19) 6 months after first round and last on 12/5/2019 and 12/19/2019. Repeat CT scan show continued inflammatory changes in the R orbit, but decreased proptosis. We discussed cytoxan again but she said it is out of question and she declined considering it. Therefore, we continued with q6mo rituximab with hope that inflammation goes down. Patient is not on any prednisone. 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 Per eye exam 1/2020, responding to rituximab, no longer has proptosis, will continue with rituximab. Has resp sx since early Feb.    Today reports hemoptysis (muscous was clear with streaks of blood from coughing s hard, son was sick 1st, Janine had flu shot)) which is resolved, now dry cough, fever is resolved, no longer has CP. Is scheduled to get cardiac cath next wk but wants to cancel as thinks it's  invasive. Reports diffuse edema.    10/2019 labs were stable with NL ESR/CRP, MPO slightly above NL range.    I don't think lung sx are due to GPA as she just had rituximab in 12/2019 and sx are improving without any intervention, but will get a CXR and consider chest CT. Will check labs.    Today's plan:    CXR today    Labs today    Follow up with Dr. Vernon     Lidocaine cream and ketoprofen powder for knee  pain    Labs today, recommend to give rituximab 1 gram every 2 weeks x 2 in 6/2020 with very slow infusion to avoid reaction.    Continue accupuncture (referral was placed as it helps with chronic pain).     My impression is that her arthralgias are a combination of IA, fibromyalgia and chronic pain.  Stopped HCQ at last visit, shortly after resumed taking it as arthralgia recurred. Continue HCQ. Eye exam is updated.    2-Fad pads. She was seen by endocrinology and cushing was ruled out in 4/2014. Was advised to do f/u for enlarged thyroid/thyroid nodules.    3-Hair loss. F/U with dermatology    4-Chronic pain. F/U with pain clinic    5-Concerns of subclinical hyperthyroidism: TSH is barely minimal, chart review shows that those lowest levels are since April 2014. At last visit, discussed Endocrinology ref which pt wants to hold off in this visit.     6-Vit D deficiency. Was replaced with vit D 50, 000 units po qwk x 12 wk then 2000 units every day    7-Upper extremity swelling. Recent mammogram without suggestion of malignancy. No LAD of axillary region. Arms appear normal on physical exam, however patient reports intermittent swelling.  - Referral to lymphedema clinic was done in the past.      PLAN: Return in 3-4 months       MEDICATION CHANGES: as above        Orders Placed This Encounter   Procedures     X-ray Chest 2 views     AST     ALT     Myeloperoxidase and Proteinase 3 Panel     Albumin level     CBC with platelets differential     Creatinine     CRP inflammation     UA with Microscopic reflex to  Culture     Protein  random urine with Creat Ratio     Creatinine random urine     Erythrocyte sedimentation rate auto     CD19 B Cell Count     ANCA IgG by IFA with Reflex to Titer     Immunoglobulins A G and M     Vitamin D Deficiency

## 2020-02-21 NOTE — LETTER
Patient:  Janine Cornell  :   1965  MRN:     [id number]        Ms.Lisa CHELLE Cornell  [address] [facility] [address]        2020    Dear ,    We are writing to inform you of your test results. Chest x-ray is clear. Don't recommend chest CT if your symptoms are already improving.      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 CT chest 3/6/2019.    FINDINGS:   PA and lateral views of the chest. The trachea is midline. Cardiac  silhouette is within normal limits. There are no focal airspace  opacities. No pleural effusion or pneumothorax. No acute osseous  abnormality. Surgical clips in the right upper quadrant. Coronary  stent. Calcified granuloma in the left lung.      Impression    IMPRESSION:   No acute airspace disease.    I have personally reviewed the examination and initial interpretation  and I agree with the findings.    CAIT BURGESS MD   Results for orders placed or performed in visit on 20   Erythrocyte sedimentation rate auto     Status: None   Result Value Ref Range    Sed Rate 10 0 - 30 mm/h   Protein  random urine with Creat Ratio     Status: None   Result Value Ref Range    Protein Random Urine 0.45 g/L    Protein Total Urine g/gr Creatinine 0.19 0 - 0.2 g/g Cr   UA with Microscopic reflex to Culture     Status: Abnormal   Result Value Ref Range    Color Urine Yellow     Appearance Urine Clear     Glucose Urine >499 (A) NEG^Negative mg/dL    Bilirubin Urine Negative NEG^Negative    Ketones Urine 5 (A) NEG^Negative mg/dL    Specific Gravity Urine 1.023 1.003 - 1.035    Blood Urine Negative NEG^Negative    pH Urine 5.0 5.0 - 7.0 pH    Protein Albumin Urine Negative NEG^Negative mg/dL     Urobilinogen mg/dL 0.0 0.0 - 2.0 mg/dL    Nitrite Urine Negative NEG^Negative    Leukocyte Esterase Urine Negative NEG^Negative    Source Midstream Urine     WBC Urine 1 0 - 5 /HPF    RBC Urine <1 0 - 2 /HPF    Squamous Epithelial /HPF Urine 1 0 - 1 /HPF    Mucous Urine Present (A) NEG^Negative /LPF    Hyaline Casts 13 (H) 0 - 2 /LPF   CRP inflammation     Status: None   Result Value Ref Range    CRP Inflammation <2.9 0.0 - 8.0 mg/L   Creatinine     Status: Abnormal   Result Value Ref Range    Creatinine 1.05 (H) 0.52 - 1.04 mg/dL    GFR Estimate 60 (L) >60 mL/min/[1.73_m2]    GFR Estimate If Black 70 >60 mL/min/[1.73_m2]   CBC with platelets differential     Status: Abnormal   Result Value Ref Range    WBC 15.4 (H) 4.0 - 11.0 10e9/L    RBC Count 4.79 3.8 - 5.2 10e12/L    Hemoglobin 12.4 11.7 - 15.7 g/dL    Hematocrit 38.6 35.0 - 47.0 %    MCV 81 78 - 100 fl    MCH 25.9 (L) 26.5 - 33.0 pg    MCHC 32.1 31.5 - 36.5 g/dL    RDW 13.5 10.0 - 15.0 %    Platelet Count 379 150 - 450 10e9/L    Diff Method Automated Method     % Neutrophils 78.4 %    % Lymphocytes 12.3 %    % Monocytes 5.9 %    % Eosinophils 2.3 %    % Basophils 0.4 %    % Immature Granulocytes 0.7 %    Nucleated RBCs 0 0 /100    Absolute Neutrophil 12.1 (H) 1.6 - 8.3 10e9/L    Absolute Lymphocytes 1.9 0.8 - 5.3 10e9/L    Absolute Monocytes 0.9 0.0 - 1.3 10e9/L    Absolute Eosinophils 0.4 0.0 - 0.7 10e9/L    Absolute Basophils 0.1 0.0 - 0.2 10e9/L    Abs Immature Granulocytes 0.1 0 - 0.4 10e9/L    Absolute Nucleated RBC 0.0    Albumin level     Status: None   Result Value Ref Range    Albumin 4.0 3.4 - 5.0 g/dL   ALT     Status: None   Result Value Ref Range    ALT 31 0 - 50 U/L   AST     Status: None   Result Value Ref Range    AST 19 0 - 45 U/L   Creatinine urine calculation only     Status: None   Result Value Ref Range    Creatinine Urine 243 mg/dL       Majo Mckinnon MD

## 2020-02-21 NOTE — LETTER
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 Cornell is a 49 yo AAF who was referred to our clinic for evaluation and management of her joint pain in setting of positive RF 26.    Her joint symptoms first started more than a year ago, but over last 6-8 months fluctuating some good and bad days . All her joints are involved. Over last 5 months, hands became swollen, warm and painful. Tylenol does not help. Ketoprofen did not help. Was told to avoid NSAIDs given ACS. Reports AM/PM stiffness x 2-3 hr, can't make full fist when wakes up in AM.    She feels tired all the time. Reports worsening hair loss and unchanged  facial rash. Gets red sore flaky rash over cheeks across her face which is intermittent diagnosed Rosacea and is prescribed metronidazole gel which she has not started using. Reports occasional oral ulcers. Hands feel cold with red discoloration last winter. Has occasional dysphagia to both solids and liquid. Has dry eyes, is using OTC allergy eyedrops. Has chronic abdominal pain. Has h/o pancreatitis. Sometime has anxiety. Occasionally has numbness, tingling in fingers/toes. Has back and spine issues, her whole back and neck hurts, different areas at different time. She is wondering if she has FMS. Has hard time sleeping, has never been diagnosed with BRENNA. She does not know if he snores. She was found to have slightly positive RF in 2/2013. Has microcytic anemia.     Her arthralgia gets worse with drop in temperature. Reports body ache. Activity makes her pain worse. Her joint swelling isintermittent. Her body pain and joint pain is the same. Reports AM stiffness x 3 hr.    She has been doing acupuncture x few months and it is helping with her pain. She  thinks that the combination of plaquenil and acupuncture is helpful but overall she notice 30% in her symptoms relief.     Takes tylenol and tramadol on occasion for pain.    She decided to use different formulation of metformin which helped with her abdominal pain. I referred her to GI for evaluation of elevated lipase and abdominal pain. She decided to see GI in the future.     She is on  mg qd since 5/2014, tolerates it well and it helps her some. Denies taking any gabapentin due to chest pain and headches. She has had referral her for water aerobics, but she can't begin until she's healed from recent surgery in 10/2014. She thinks HCQ is helping but not enough to control all her pain, reports 2-3 hr of AM stiffness with ongoing diffuse arthralgia/myalgia along with intermittent swelling of hands. She does see her acupuncture person and it helps with her pain, has not started water aerobics yet. Has got her flu shot.     10/2015: Reports having pleuritic CP in 3/2015, was prescribed Lidoderm patches first. It did not help. Took prednisone (?dose) by her own, pain got better but it recurred off prednisone and gradually got worse to the point that she could not stand the pain anymore, was seen in ER in 5/2015, was treated with medrol dose pack which helped. Reports pain over hips, knees, ankles and fingers. Pain gets worse with walking. Reports myalgia, swelling of the arms. Pain over neck, shoulders. Has AM stiffness x 3-4 hr. Fingers swell up and become painful. Can't remember if steroid helped with joint pain. She had headaches, severe CP when she took tramadol and gabapentin and stopped taking them, sx resolved but she can't tell which one caused SEs but thinks that probably it was gabapentin. She has good days and tries to be more active but activity aggravates the pain. Headaches are intermittent. HCQ helps some not enough to control all the pain. No HCQ SEs. Had eye exam around July 2015. Flexeril  helps but PCP wants to change flexeril to zanaflex, reporting that she is NOT comfortable with the change. Acupuncture still helps an she continued to  do that. Concerned about fat pads she has in supraclavicular area, has h/o thyroid nodules. Continues having hair loss, takes iron for iron deficiency.     2/2016: She has 2 major complaints today, increased joint pain/swelling in hands/feet and recent Dx of optic neuritis in R eye, reports having similar problem about 20 yr ago, now recurred. Has a spot in R eye vision x long term, was seen by ophthalmology few days ago and was told to have optic neuritis. Gets joint pain, muscle pain. Can't  objects, hands are swollen. Knees, hips and ankles are painful. Reports more arthralgia. AM stiffness/ pain is 3-4 hr. She wants me to talk to her ophthalmologist directly.    She had botox inj for migraines which did not help her. She is going to have angiogram next wk, had to take more nitro for CP recently.     4/29/2016: She reports being on steroid taper x 3 since last visit. Reportedly, had orbit MRI, it showed swelling/inflamamtion of R lacrimal glands. She had painful swelling of her R eye, cause her headaches. Botox inj made her headaches worse. She is being dealing with this since 1/2016, with severe headaches and pain/swelling around R eye. Had biopsy in 3/2016. She is frustrated as prednisone causes her weight gain and her BG is difficult to control on it.    5/2016: At last visit, was prescribed MTX 10 mg po qwk to take along with FA 1 mg qd. She decided not to take MTX, is afraid of side effects, believes all these medications would harm her. She also believes that botox caused her inflammation around R eye. No major flare up of per-orbital inflamamtion since last visit but continues to have swelling around her R eye.    11/2016: Reports diffuse body ache, arthralgia, myalgia especially with weather change. No major flare up of campos-orbital inflammation but  her face/around R eye swells up from time to time. Reports 2 episodes of CP over past 2 mo, nitro sometimes helps and sometimes does not help. She has asthma and costochondritis and she has hard time to distinguish the origin of pain. Sometimes knees and fingers swell up. Her stiffness is sometimes all day.    4/2017:  Reports having sinusitis since last visit. Her R eye is dry and is using eyedrops to keep it moist. Reports having pain in ears. Was treated with antibiotics by PCP, it got better then it got worse. Reports catching infections easily. Then started having pressure over eyes with pain/headaches (exact start date is unknown). Pain gradually got worse and became persistent. Had sinus CT.     4/7/2017: Having a flare up of swelling, pain around her R orbit. Has not tried MTX yet.    5/2017: On MTX 10 mg po qwk since 4/7/2017, reports increased stomach pain and nausea and new headaches since start of MTX and it's not helping. Pain/swelling around R orbit is worse.    6/2017: She finished prednisone taper prescribed at last visit, R campos-orbital swelling significantly improved. 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    2/2018: R campos-orbital swelling has improved. Repeat R lacrimal gland biopsy in 8/2017 showed non specific inflammation with no lymphoma. She is off steroid. Did not tolerate AZA due to N/V and abdominal pain.    Is back with recurrence of R campos-orbital sweling x past 2 months. Pain really got worse over past 3wk, requries     9/2018: Declined ritiximab at last visit, lost f/u since 2/2018. Went to [place] and was seen on 8/29 by Dr. 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 Reportedly he ordered labs and recommended surgery since the inflammation of the R eye is back and is pushing the eye down. Janine took  some prednsione 30 mg every day few days a go for pain.    3/2019: She had lateral orbitotomy and debulking orbital mass right eye on 9/26/18 with Dr. Shah and Dr. Valdez at [place]. 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 There were no complications according to the op note.    Janine finally agreed to receive rituximab for her GPA. She had it as 1 gr q2wk x 2 on 12/12/2018 and 12/26/2018. Had minor allergic reaction which was managed by IV solu cortef and benadryl. She is off prednisone about 6wk after surgery. Had bleeding ulcer from prednsione. Has pain over sinus, ears. Still has residual pain, swelling around her R eye is concerned about it. Wants to transfer her care to ophthalmology here as it's closer to her.    Cold induced sweating congestion joint pain  Facial swelling  allegy doctor clear ear pft ok doxy sinusitis     (7/12/19):  Patient completed her 2 rounds of rituximab in June. She tolerated well. She was recently seen by Dr. Vernon in Opth[facility] and repeat CT scan of orbits shows some decrease in proptosis. She reports that her R eye still intermittently feels puffy. She also mentions intermittent swelling in her arms. Unclear of any triggers. Limbs are also heavy with muscle aches and some joint pains. Exacerbated with activity.    10/29/2019:    Not feeling well with headaches, diffuse arthralgia and myalgia being worse over past 2 months. Headaches wax and wane and never go away. Gets swelling around her R eyes with numbness/tingling of R cheek.    Gets hives randomly, over trunk, extremities.    Has shortness of breath on walking fast. Gets cough from asthma, nothing new. No hemoptysis. Fingers start to hurt and itch with drop in T.      Today 2/21/2020:    Not feeling well. Reports SOB, pleuritic CP, dry cough since early Feb. Feels retaining fluid. Gets headaches. Her cardiologist recommended cardiac cath. Tylenol is not helping. T was 101 last wk, not today, took tylenol to drop  tylenol and used ice pack on her head and had lots of water and tea, has poor appetite. Feeling better but cough is still there, worse in AM and night. First had blood in sputum but does not have it anymore. Has diffuse arthralgia, myalgia, worse x past 3 months due to cold weather. Fatigue is worse. No pleuritic CP today. No fevers today but feels short of breath a lot (became chronic), worse with activity.    No rash.    Her eye swelling/pseudotumor is quiet, still has residual numbness from surgery, gets headaches.    Saw Dr. Vernon in 1/2020, Dr. Vernon thought that pseudotumor responded to rituximab.      Last rituximab was in 12/5/2019 and 12/19/2019.    Component      Latest Ref Rng 2/28/2013 2/28/2013          12:14 PM 12:14 PM   WBC      4.0 - 11.0 10e9/L     RBC Count      3.8 - 5.2 10e12/L     Hemoglobin      11.7 - 15.7 g/dL     Hematocrit      35.0 - 47.0 %     MCV      78 - 100 fl     MCH      26.5 - 33.0 pg     MCHC      31.5 - 36.5 g/dL     RDW      10.0 - 15.0 %     Platelet Count      150 - 450 10e9/L     Specimen Description           Lyme Screen IgG and IgM           Vitamin D Deficiency screening      30 - 75 ug/L     Ferritin      10 - 300 ng/mL     Sed Rate      0 - 20 mm/h     ALLI Screen by EIA      <1.0     Rheumatoid Factor      0 - 14 IU/mL     CK Total      30 - 225 U/L  78   Uric Acid      2.5 - 7.5 mg/dL 6.7      Component      Latest Ref Rng 2/28/2013          12:14 PM   WBC      4.0 - 11.0 10e9/L    RBC Count      3.8 - 5.2 10e12/L    Hemoglobin      11.7 - 15.7 g/dL    Hematocrit      35.0 - 47.0 %    MCV      78 - 100 fl    MCH      26.5 - 33.0 pg    MCHC      31.5 - 36.5 g/dL    RDW      10.0 - 15.0 %    Platelet Count      150 - 450 10e9/L    Specimen Description       Serum   Lyme Screen IgG and IgM       Test value: <0.75....Interpretation: Negative....If you highly suspect Lyme . . .   Vitamin D Deficiency screening      30 - 75 ug/L    Ferritin      10 - 300 ng/mL    Sed Rate      0  - 20 mm/h    ALLI Screen by EIA      <1.0    Rheumatoid Factor      0 - 14 IU/mL    CK Total      30 - 225 U/L    Uric Acid      2.5 - 7.5 mg/dL      Component      Latest Ref Rng 2/28/2013 2/28/2013 2/28/2013 2/28/2013          12:14 PM 12:14 PM 12:14 PM 12:14 PM   WBC      4.0 - 11.0 10e9/L       RBC Count      3.8 - 5.2 10e12/L       Hemoglobin      11.7 - 15.7 g/dL       Hematocrit      35.0 - 47.0 %       MCV      78 - 100 fl       MCH      26.5 - 33.0 pg       MCHC      31.5 - 36.5 g/dL       RDW      10.0 - 15.0 %       Platelet Count      150 - 450 10e9/L       Specimen Description             Lyme Screen IgG and IgM             Vitamin D Deficiency screening      30 - 75 ug/L       Ferritin      10 - 300 ng/mL    10   Sed Rate      0 - 20 mm/h   23 (H)    ALLI Screen by EIA      <1.0  <1.0 . . .     Rheumatoid Factor      0 - 14 IU/mL 26 (H)      CK Total      30 - 225 U/L       Uric Acid      2.5 - 7.5 mg/dL         Component      Latest Ref Rng 2/28/2013 2/28/2013          12:14 PM 12:14 PM   WBC      4.0 - 11.0 10e9/L 14.1 (H)    RBC Count      3.8 - 5.2 10e12/L 4.55    Hemoglobin      11.7 - 15.7 g/dL 10.7 (L)    Hematocrit      35.0 - 47.0 % 33.3 (L)    MCV      78 - 100 fl 73 (L)    MCH      26.5 - 33.0 pg 23.5 (L)    MCHC      31.5 - 36.5 g/dL 32.1    RDW      10.0 - 15.0 % 18.1 (H)    Platelet Count      150 - 450 10e9/L 407    Specimen Description           Lyme Screen IgG and IgM           Vitamin D Deficiency screening      30 - 75 ug/L  32   Ferritin      10 - 300 ng/mL     Sed Rate      0 - 20 mm/h     ALLI Screen by EIA      <1.0     Rheumatoid Factor      0 - 14 IU/mL     CK Total      30 - 225 U/L     Uric Acid      2.5 - 7.5 mg/dL          MRI CERVICAL SPINE WITHOUT CONTRAST 4/3/2013 12:47 PM    HISTORY: Cervicalgia. Degeneration of cervical intervertebral disc.  MRI cervical spine. Evaluate bilateral supraclavicular lymph nodes.  Clinical enlargement.    TECHNIQUE: Multiplanar multisequence  MRI of the cervical spine  without gadolinium contrast.    COMPARISON: None.    FINDINGS: The patient has a developmentally small central canal.  Images of the cervical cord reveals small areas of T2 hyperintensity  within the cervical cord. There is a small area of high signal  intensity at the C1 level. There is also a small area of high signal  intensity at the C6-C7 level. The area of high signal at C6-C7 is  located at an area of central spinal stenosis. This may be due to  myelomalacia. The area of high signal at C1-C2 is indeterminate.    C2-C3: Normal disc, facet joints, spinal canal and neural foramina.    C3-C4: Normal disc, facet joints, spinal canal and neural foramina.    C4-C5: Normal disc, facet joints, spinal canal and neural foramina.    C5-C6: There is degeneration of the disc. There is a mild annular  disc bulge. The central canal is mild-moderately narrowed. The  residual AP diameter of the central spinal canal measures  approximately 9 mm.    C6-C7: There is degeneration of the disc. There is loss of disc space  height. There is a diffuse annular disc bulge. There are associated  posterior osteophytes. There are severe central spinal stenosis at  this level. The residual AP diameter of the central spinal canal is  only about 6 mm. There is significant flattening of the cord. There  is high signal in the cord at this level consistent with  myelomalacia. There is moderate to severe right foraminal stenosis  due to and uncinate spur.    C7-T1: Normal disc, facet joints, spinal canal and neural foramina.            Result Impression       IMPRESSION:  1. Severe central spinal stenosis at C6-C7 due to a developmentally  small canal and due to a bulging disc and associated posterior  osteophyte. There is flattening of the cord at this level and high  signal in the cord at this level consistent with myelomalacia.  2. There is a small, indeterminate 2-3 mm area of high signal  intensity in the cord at  the C1 level. This could be due to gliosis  secondary to a previous infectious or inflammatory process.  Demyelinating disease could also have a similar appearance. Clinical  correlation suggested.    GAIL MORE MD     MRI LEFT UPPER EXTREMITY NON-JOINT WITHOUT CONTRAST, MRI RIGHT UPPER  EXTREMITY NON-JOINT WITHOUT CONTRAST April 3, 2013 1:32 PM    HISTORY: Cervicalgia. Degeneration of cervical intervertebral disc.    TECHNIQUE: Multiplanar, multisequence imaging of the brachial plexus  was performed without gadolinium contrast enhancement.    COMPARISON: None.    FINDINGS: No mass lesions are seen. No inflammatory process is  identified. The roots, trunks, branches, and divisions of both the  right and left brachial plexus appear normal. No adenopathy is seen.  The anterior scalene muscles and the middle scalene muscles appear  normal. Nodules are seen within the thyroid gland. The largest nodule  is seen in the left lobe of the thyroid. This measures about 1.8 cm  in diameter.            Result Impression       IMPRESSION:  1. Both the right and left brachial plexus regions appear normal.  2. Thyroid nodules. The largest nodule is in the left lobe of the  thyroid. It measures about 1.8 cm in diameter.       XR WRIST BILATERAL G/E 3 VIEWS    Narrative:        EXAMINATION:  1. Each hand 3 views  2. Each wrist 3 views     DATE: 5/1/2013     HISTORY: Arthropathy with concern for rheumatoid.     FINDINGS: 3 views of each hand and 3 views of each wrist are obtained  without prior for comparison. Alignment is normal. There is no  fracture or acute bone abnormality. No distinct erosions are seen.  Some spurring of the distal radial ulnar joint is noted on the right.       Impression:     IMPRESSION:  1. No evidence of an inflammatory arthropathy involving either hand  or wrist.     VIELKA GUEVARA MD         XR FOOT BILATERAL G/E 3 VIEWS    Narrative:        EXAMINATION:  1. Each foot 3 views  2. Each ankle 3 views  3.  Sacroiliac joint series     DATE: 5/1/2013     HISTORY: Arthropathy; concern for rheumatoid.     FINDINGS: No prior for comparison.     A frontal and bilateral oblique evaluation of the sacroiliac joints  shows no malalignment. Some patchy sclerosis is identified along the  central aspect of both sacroiliac joints, which is favored to be  degenerative. No distinct erosions are seen. The visualized portion  of the hip joint spaces appear maintained, with no erosive changes  identified. Mild endplate spurring is noted in the lower lumbar  spine.     3 views of each foot and 3 views of each ankle show no evidence of  acute fracture or dislocation. The metatarsal phalangeal,  tarsometatarsal and intertarsal joint spaces appear maintained. No  erosions are identified. There is no sign of acroosteolysis. The  ankle mortise and talar dome are intact bilaterally. Minimal spurring  is noted along the tip of the medial malleolus bilaterally.       Impression:     IMPRESSION:  1. Patchy sclerosis identified along the central aspect of both  sacroiliac joints, which is favored to be degenerative. No distinct  erosions are seen.  2. No evidence of an inflammatory arthropathy in either foot or ankle.     VIELKA GUEVARA MD     5/2013  CBC WITH PLATELETS DIFFERENTIAL       Component Value Range    WBC 11.3 (*) 4.0 - 11.0 10e9/L    RBC Count 4.56  3.8 - 5.2 10e12/L    Hemoglobin 11.1 (*) 11.7 - 15.7 g/dL    Hematocrit 34.3 (*) 35.0 - 47.0 %    MCV 75 (*) 78 - 100 fl    MCH 24.3 (*) 26.5 - 33.0 pg    MCHC 32.4  31.5 - 36.5 g/dL    RDW 16.1 (*) 10.0 - 15.0 %    Platelet Count 315  150 - 450 10e9/L    Diff Method Automated Method      % Neutrophils 71.6  40 - 75 %    % Lymphocytes 20.9  20 - 48 %    % Monocytes 4.3  0 - 12 %    % Eosinophils 2.8  0 - 6 %    % Basophils 0.2  0 - 2 %    % Immature Granulocytes 0.2  0 - 0.4 %    Absolute Neutrophil 8.1  1.6 - 8.3 10e9/L    Absolute Lymphocytes 2.4  0.8 - 5.3 10e9/L    Absolute Monoctyes 0.5   0.0 - 1.3 10e9/L    Absolute Eosinophils 0.3  0.0 - 0.7 10e9/L    Absolute Basophils 0.0  0.0 - 0.2 10e9/L    Abs Immature Granulocytes 0.0  0 - 0.03 10e9/L   AMYLASE       Component Value Range    Amylase 103  30 - 110 U/L   COMPREHENSIVE METABOLIC PANEL       Component Value Range    Sodium 144  133 - 144 mmol/L    Potassium 3.8  3.4 - 5.3 mmol/L    Chloride 105  94 - 109 mmol/L    Carbon Dioxide 23  20 - 32 mmol/L    Anion Gap 17  6 - 17 mmol/L    Glucose 173 (*) 60 - 99 mg/dL    Urea Nitrogen 13  5 - 24 mg/dL    Creatinine 0.83  0.52 - 1.04 mg/dL    GFR Estimate 74  >60 mL/min/1.7m2    GFR Estimate If Black 90  >60 mL/min/1.7m2    Calcium 9.7  8.5 - 10.4 mg/dL    Bilirubin Total 0.4  0.2 - 1.3 mg/dL    Albumin 4.3  3.9 - 5.1 g/dL    Protein Total 7.8  6.8 - 8.8 g/dL    Alkaline Phosphatase 66  40 - 150 U/L    ALT 36  0 - 50 U/L    AST 28  0 - 45 U/L   CK TOTAL       Component Value Range    CK Total 66  30 - 225 U/L   CRP INFLAMMATION       Component Value Range    CRP Inflammation 10.4 (*) 0.0 - 8.0 mg/L   LIPASE       Component Value Range    Lipase 353 (*) 20 - 250 U/L   ERYTHROCYTE SEDIMENTATION RATE AUTO       Component Value Range    Sed Rate 26 (*) 0 - 20 mm/h   ROUTINE UA WITH MICROSCOPIC REFLEX TO CULTURE       Component Value Range    Color Urine Yellow      Appearance Urine Slightly Cloudy      Glucose Urine 30 (*) NEG mg/dL    Bilirubin Urine Negative  NEG    Ketones Urine 5 (*) NEG mg/dL    Specific Gravity Urine 1.026  1.003 - 1.035    Blood Urine Trace (*) NEG    pH Urine 5.0  5.0 - 7.0 pH    Protein Albumin Urine 10 (*) NEG mg/dL    Urobilinogen mg/dL Normal  0.0 - 2.0 mg/dL    Nitrite Urine Negative  NEG    Leukocyte Esterase Urine Negative  NEG    Source Midstream Urine      WBC Urine 1  0 - 2 /HPF    RBC Urine 4 (*) 0 - 2 /HPF    Squamous Epithelial /HPF Urine <1  0 - 1 /HPF    Mucous Urine Present (*) NEG /LPF    Hyaline Casts 3 (*) 0 - 2 /LPF    Calcium Oxalate Moderate (*) NEG /HPF    COMPLEMENT C3       Component Value Range    Complement C3 143  76 - 169 mg/dL   COMPLEMENT C4       Component Value Range    Complement C4 31  15 - 50 mg/dL   HEPATITIS C ANTIBODY       Component Value Range    Hepatitis C Antibody Negative  NEG   CARDIOLIPIN ANTIBODY IGG AND IGM       Component Value Range    Cardiolipin IgG Marline    0 - 15.0 GPL    Value: <15.0      Interpretation:  Negative    Cardiolipin IgM Marline    0 - 12.5 MPL    Value: <12.5      Interpretation:  Negative   LUPUS PANEL       Component Value Range    Lupus Result    NEG    Value: Negative      (Note)      COMMENTS:      The INR is normal.      APTT is normal.  1:2 Mix is not indicated.      DRVVT Screen is normal.      Thrombin time is normal.      NEGATIVE TEST; A LUPUS ANTICOAGULANT WAS NOT DETECTED IN THIS      SPECIMEN WITHIN THE LIMITS OF THE TESTING REPERTOIRE.      If the clinical picture is strongly suggestive of an antiphospholipid      syndrome, recommend anticardiolipin and beta-2-glycoprotein (IgG and      IgM) antibody tests.      Leela Franks M.D.  [phone]      5/2/2013            INR =  0.93 N = 0.86-1.14  (No additional charge)      TT = 15.7 N = 13.0-19.0 sec  (No additional charge)            APTT'S:    Seconds      Reagent =  Stago LA      Normal  =  38      Patient  =  35      1:2 Mix  =  N/A      Reference =  31-43             DILUTE MADELINE VIPER VENOM TEST:      DRVVT Screen Ratio = 0.76 Normal = <1.21         IMMUNOGLOBULIN G SUBCLASSES       Component Value Range    IGG 1030  695 - 1620 mg/dL    IgG1 488  300 - 856 mg/dL    IgG2 325  158 - 761 mg/dL    IgG3 47  24 - 192 mg/dL    IgG4 18  11 - 86 mg/dL   SUNNY ANTIBODY PANEL       Component Value Range    Ribonucleic Protein IgG Antibody 0      Smith Antibody IgG 1      SSA (RO) Antibody IgG 4      SSB (LA) Antibody IgG 0      Scleroderma Antibody IgG 0     BETA 2 GLYCOPROTEIN ANTIBODIES IGG IGM       Component Value Range    Beta-2-Glycopro IgG 1       Beta-2-Glycopro IgM 5     CYCLIC CIT PEPT IGG       Component Value Range    Cyclic Cit Pept IgG/IgA    <20 UNITS    Value: <20      Interpretation:  Negative   DNA DOUBLE STRANDED ANTIBODIES       Component Value Range    DNA-ds    0 - 29 IU/mL    Value: <15      Interpretation:  Negative       CT CHEST PULMONARY EMBOLISM W CONTRAST 5/20/2015 4:57 PM  HISTORY: Pain. SOB. Elevated d-dimer.   TECHNIQUE: 65 mL Isovue 370. Axial images with coronal  reconstructions.  COMPARISON: None.  FINDINGS: Calcified granuloma with surrounding scarring in the  posterolateral left upper lobe. Triangular shaped opacity at the right  lung base in the lateral right middle lobe could represent some  scarring, atelectasis or infiltrate. There is also some scarring or  atelectasis in the posteromedial right lung base. Lungs otherwise  clear.  The pulmonary arteries are well opacified. No CT evidence for acute  pulmonary embolus. No aortic dissection.  Diffuse fatty infiltration of the liver.  IMPRESSION  IMPRESSION:   1. No pulmonary embolus identified.  2. Small focus of atelectasis, infiltrate or scarring in the lateral  right middle lobe.  3. Old granulomatous disease.  4. Otherwise negative.  SILVERIO VAZQUEZ MD    Results for FAVIO MARTINEZ (MRN [id number]) as of 10/30/2015 17:00   Ref. Range 8/21/2012 09:06 5/22/2013 14:22 4/14/2014 12:06 9/11/2014 12:35 12/4/2014 16:38   TSH Latest Range: 0.40-4.00 mU/L 0.83 0.43 0.27 (L) 0.23 (L) 0.22 (L)     Component      Latest Ref Rng 10/30/2015   WBC      4.0 - 11.0 10e9/L 13.4 (H)   RBC Count      3.8 - 5.2 10e12/L 4.76   Hemoglobin      11.7 - 15.7 g/dL 12.4   Hematocrit      35.0 - 47.0 % 37.9   MCV      78 - 100 fl 80   MCH      26.5 - 33.0 pg 26.1 (L)   MCHC      31.5 - 36.5 g/dL 32.7   RDW      10.0 - 15.0 % 14.5   Platelet Count      150 - 450 10e9/L 324   Diff Method       Automated Method   % Neutrophils       67.7   % Lymphocytes       22.7   % Monocytes       6.3   % Eosinophils        2.7   % Basophils       0.4   % Immature Granulocytes       0.2   Absolute Neutrophil      1.6 - 8.3 10e9/L 9.1 (H)   Absolute Lymphocytes      0.8 - 5.3 10e9/L 3.1   Absolute Monoctyes      0.0 - 1.3 10e9/L 0.9   Absolute Eosinophils      0.0 - 0.7 10e9/L 0.4   Absolute Basophils      0.0 - 0.2 10e9/L 0.1   Abs Immature Granulocytes      0 - 0.4 10e9/L 0.0   Creatinine      0.52 - 1.04 mg/dL 1.21 (H)   GFR Estimate      >60 mL/min/1.7m2 47 (L)   GFR Estimate If Black      >60 mL/min/1.7m2 57 (L)   Iron      35 - 180 ug/dL 70   Iron Binding Cap      240 - 430 ug/dL 428   Iron Saturation Index      15 - 46 % 16   Albumin      3.4 - 5.0 g/dL 4.6   ALT      0 - 50 U/L 29   AST      0 - 45 U/L 21   CRP Inflammation      0.0 - 8.0 mg/L <2.9   Sed Rate      0 - 20 mm/h 8   Vitamin D Deficiency screening      20 - 75 ug/L 46   Ferritin      8 - 252 ng/mL 18   TSH      0.40 - 4.00 mU/L 0.49   T4 Free      0.76 - 1.46 ng/dL 1.06   Free T3      2.3 - 4.2 pg/mL 2.8     Component      Latest Ref Rng 11/17/2015   Testosterone Total      8 - 60 ng/dL 19   Sex Hormone Binding Globulin      30 - 135 nmol/L 32   Free Testosterone Calculated      0.11 - 0.58 ng/dL 0.34   Vitamin A       0.61   Retinol Palmitate       <0.02 . . .   Vitamin A Interp       Normal . . .   Thyroglobulin Antibody      <40 IU/mL <20   Thyroid Peroxidase Antibody      <35 IU/mL 31   DHEA Sulfate      35 - 430 ug/dL 101   Zinc       68     Component      Latest Ref Rng 1/27/2016   Sodium      133 - 144 mmol/L 135   Potassium      3.4 - 5.3 mmol/L 4.0   Chloride      94 - 109 mmol/L 104   Carbon Dioxide      20 - 32 mmol/L 24   Anion Gap      3 - 14 mmol/L 7   Glucose      70 - 99 mg/dL 88   Urea Nitrogen      7 - 30 mg/dL 20   Creatinine      0.52 - 1.04 mg/dL 1.13 (H)   GFR Estimate      >60 mL/min/1.7m2 51 (L)   GFR Estimate If Black      >60 mL/min/1.7m2 62   Calcium      8.5 - 10.1 mg/dL 9.4   Bilirubin Total      0.2 - 1.3 mg/dL 0.7   Albumin      3.4  "- 5.0 g/dL 4.3   Protein Total      6.8 - 8.8 g/dL 7.7   Alkaline Phosphatase      40 - 150 U/L 66   ALT      0 - 50 U/L 24   AST      0 - 45 U/L 18   Cholesterol      <200 mg/dL 112   Triglycerides      <150 mg/dL 100   HDL Cholesterol      >49 mg/dL 34 (L)   LDL Cholesterol Calculated      <100 mg/dL 58   Non HDL Cholesterol      <130 mg/dL 78   N-Terminal Pro Bnp      0 - 125 pg/mL 29   Hemoglobin A1C      4.3 - 6.0 % 7.0 (H)   Amylase      30 - 110 U/L 60   Lipase      73 - 393 U/L 394 (H)   Troponin I ES      0.000 - 0.045 ug/L <0.015 . . .     Component      Latest Ref Rng 2/24/2016   Angiotensin Converting Enzyme       <5 (L) . . .   Neutrophil Cytoplasmic IgG Antibody       <1:20 . . .   Sed Rate      0 - 20 mm/h 86 (H)     Copath Report      Patient Name: FAVIO MARTINEZ   MR#: [id number]   Specimen #: [id number]   Collected: 3/15/2016   Received: 3/15/2016   Reported: 3/17/2016 13:33   Ordering Phy(s): PARVEEN ENRIQUEZ     ORIGINAL REPORT FOLLOWS ADDENDUM  ADDENDUM     TO ORIGINAL REPORT   Status: Signed Out   Date Ordered:3/18/2016   Date Complete:3/18/2016   Date Reported:3/18/2016 12:06   Signed Out By: Marianne Godfrey MD     INTERPRETATION:   This addendum is done to incorporate the results of fungal (GMS) stains   done on the specimen.  Specimen is negative for fungal organisms.  The   original final diagnosis remains unchanged.     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  Negative for malignancy(Please   see microscopic description)     Electronically signed out by:     Marianne Godfrey MD     CLINICAL HISTORY:   right orbital mass     GROSS:   The specimen is received labeled \"right orbital biopsy\" and consists of   red-tan nodule measuring 1.5 x 0.9 x 0.6 cm with one smooth " side and   opposite rough side consistent with periosteum.  The specimen is   bisected revealing homogenous pale tan fleshy cut surface.  Touch   preparations are prepared, air dried and fixed, portion of specimen is   submitted in RPMI for possible lymphoma workup [facility],   ([facility], [place], [place] ).  The remainder is entirely submitted.   (Dictated by: Marianne Godfrey MD 3/15/2016 04:45 PM)     MICROSCOPIC:     Specimen consists of portion of lacrimal gland with acute and chronic   inflammation.  Focal area of microabscess formation associated with   small areas of necrosis are also present.  Inflammation is seen   extending to the periorbital adipose tissue forming panniculitis.   Specimen is negative for malignancy.  Samples sent for immunophenotyping   to [facility], ([facility], [place], [place] ) reveals no evidence   of monoclonality or aberrant antigen expression.  A GMS (fungal) stain   is pending and results will be reported as an addendum.     CPT Codes:   A: 60851-HJ0, 67455-FOBMG, SOH, 70117-FPDOM, 15787-RCKN     TESTING LAB LOCATION:   [address]  [id number] [phone]     COLLECTION SITE:   Client: [facility]   [facility]            Component      Latest Ref Rng 4/29/2016   Nucleated RBCs      0 /100 0   Absolute Neutrophil      1.6 - 8.3 10e9/L 8.9 (H)   Absolute Lymphocytes      0.8 - 5.3 10e9/L 3.2   Absolute Monocytes      0.0 - 1.3 10e9/L 0.8   Absolute Eosinophils      0.0 - 0.7 10e9/L 0.2   Absolute Basophils      0.0 - 0.2 10e9/L 0.1   Abs Immature Granulocytes      0 - 0.4 10e9/L 0.1   Absolute Nucleated RBC       0.0   IGG      695 - 1620 mg/dL 836   IgG1      300 - 856 mg/dL 280 (L)   IgG2      158 - 761 mg/dL 277   IgG3      24 - 192 mg/dL 35   IgG4      11 - 86 mg/dL 16   RNP Antibody IgG      0.0 - 0.9 AI <0.2 . . .   Smith SUNNY Antibody IgG      0.0 - 0.9 AI <0.2 . . .   SSA (Ro)  (SUNNY) Antibody, IgG      0.0 - 0.9 AI <0.2 . . .   SSB (La) (SUNNY) Antibody, IgG      0.0 - 0.9 AI <0.2 . . .   Scleroderma Antibody Scl-70 SUNNY IgG      0.0 - 0.9 AI <0.2 . . .   Cholesterol      <200 mg/dL 154   Triglycerides      <150 mg/dL 220 (H)   HDL Cholesterol      >49 mg/dL 42 (L)   LDL Cholesterol Calculated      <100 mg/dL 67   Non HDL Cholesterol      <130 mg/dL 111   M Tuberculosis Result      NEG Negative   M Tuberculosis Antigen Value       0.00   Albumin      3.4 - 5.0 g/dL 4.3   ALT      0 - 50 U/L 30   AST      0 - 45 U/L 10   Complement C3      76 - 169 mg/dL 157   Complement C4      15 - 50 mg/dL 32   CRP Inflammation      0.0 - 8.0 mg/L <2.9   Sed Rate      0 - 20 mm/h 2   DNA-ds      <10 IU/mL 1   Cyclic Citrullinated Peptide Antibody, IgG      <7 U/mL 1   Rheumatoid Factor      <20 IU/mL <20   Proteinase 3 Antibody IgG      0.0 - 0.9 AI <0.2 . . .   Myeloperoxidase Antibody IgG      0.0 - 0.9 AI 2.5 (H)   Vitamin D Deficiency screening      20 - 75 ug/L 24   Hemoglobin A1C      4.3 - 6.0 % 7.9 (H)   ALLI by IFA IgG       1:40 (A) . . .     Component      Latest Ref Rng & Units 4/7/2017   WBC      4.0 - 11.0 10e9/L 11.3 (H)   RBC Count      3.8 - 5.2 10e12/L 4.77   Hemoglobin      11.7 - 15.7 g/dL 12.5   Hematocrit      35.0 - 47.0 % 38.7   MCV      78 - 100 fl 81   MCH      26.5 - 33.0 pg 26.2 (L)   MCHC      31.5 - 36.5 g/dL 32.3   RDW      10.0 - 15.0 % 13.2   Platelet Count      150 - 450 10e9/L 347   Diff Method       Automated Method   % Neutrophils      % 67.1   % Lymphocytes      % 22.9   % Monocytes      % 6.2   % Eosinophils      % 3.0   % Basophils      % 0.4   % Immature Granulocytes      % 0.4   Nucleated RBCs      0 /100 0   Absolute Neutrophil      1.6 - 8.3 10e9/L 7.5   Absolute Lymphocytes      0.8 - 5.3 10e9/L 2.6   Absolute Monocytes      0.0 - 1.3 10e9/L 0.7   Absolute Eosinophils      0.0 - 0.7 10e9/L 0.3   Absolute Basophils      0.0 - 0.2 10e9/L 0.1   Abs Immature  Granulocytes      0 - 0.4 10e9/L 0.1   Absolute Nucleated RBC       0.0   IGG      695 - 1620 mg/dL 962   IgG1      300 - 856 mg/dL Test sent to [facility]. See ARMISC.   IgG2      158 - 761 mg/dL Test sent to [facility]. See ARMISC.   IgG3      24 - 192 mg/dL Test sent to [facility]. See ARMISC.   IgG4      11 - 86 mg/dL Test sent to [facility]. See ARMISC.   Result       SEE NOTE . . .   Test Name       IGG SUBCLASSES   Send Outs Misc Test Code       09575   Send Outs Misc Test Specimen       Serum   Creatinine      0.52 - 1.04 mg/dL 1.20 (H)   GFR Estimate      >60 mL/min/1.7m2 47 (L)   GFR Estimate If Black      >60 mL/min/1.7m2 57 (L)   Creatinine Urine      mg/dL    Albumin Urine mg/L      mg/L    Albumin Urine mg/g Cr      0 - 25 mg/g Cr    Myeloperoxidase Antibody IgG      0.0 - 0.9 AI 2.9 (H)   Proteinase 3 Antibody IgG      0.0 - 0.9 AI <0.2 . . .   Neutrophil Cytoplasmic IgG Antibody       1:80 (A) . . .   Angiotensin Converting Enzyme       <5 (L) . . .   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      Comparison: MRI brain 5/29/2013      Technique:   Orbits: Axial and coronal T1-weighted, and coronal T2-weighted images  obtained without intravenous contrast. Post-intravenous contrast  (using gadolinium) sagittal FLAIR, were obtained with fat-saturation,  focused on the orbits.  Brain: Axial susceptibility-weighted and FLAIR sequences were obtained  of the whole brain without intravenous  contrast, and postcontrast  axial T1-weighted images were obtained through the whole brain.   Contrast: 7.5mL Gadavist     Findings:  New asymmetric enlargement of the right lacrimal gland and enhancement  of the right lacrimal gland with surrounding ill-defined crescentic T2  hyperintense signal and enhancement within the right superior  superotemporal orbit, predominantly involving the extraconal space  with slight intraconal extension. No definite associated restricted  diffusion. No discrete extraocular muscle enlargement. Normal  symmetric optic nerve signal. Cavernous sinuses and orbital apices are  normal. Globes are normal.     Whole brain images are symmetric minimal leukoaraiosis and generalized  parenchymal volume loss. Ventricles are not enlarged. No intracranial  hemorrhage, mass effect, midline shift or abnormal extra axial fluid  collection. No abnormal foci of intracranial enhancement or restricted  diffusion. Paranasal sinuses and mastoid air cells are clear.            Impression:    New abnormal enlargement of the right lacrimal gland with surrounding  ill-defined T2 hyperintense signal and enhancement centered in the  superotemporal right orbital fat, which may represent   infectious/inflammatory dacryadenitis, orbital pseudotumor, lymphoma,  carcinoma, sarcoidosis or IgG4 disease..     I have personally reviewed the examination and initial interpretation  and I agree with the findings.     PATRICIA BRANTLEY MD      Component      Latest Ref Rng & Units 6/1/2017   WBC      4.0 - 11.0 10e9/L 12.0 (H)   RBC Count      3.8 - 5.2 10e12/L 5.18   Hemoglobin      11.7 - 15.7 g/dL 13.8   Hematocrit      35.0 - 47.0 % 41.0   MCV      78 - 100 fl 79   MCH      26.5 - 33.0 pg 26.6   MCHC      31.5 - 36.5 g/dL 33.7   RDW      10.0 - 15.0 % 14.8   Platelet Count      150 - 450 10e9/L 322   Diff Method       Automated Method   % Neutrophils      % 76.7   % Lymphocytes      % 16.5   % Monocytes      % 4.6   %  Eosinophils      % 1.9   % Basophils      % 0.3   Absolute Neutrophil      1.6 - 8.3 10e9/L 9.2 (H)   Absolute Lymphocytes      0.8 - 5.3 10e9/L 2.0   Absolute Monocytes      0.0 - 1.3 10e9/L 0.6   Absolute Eosinophils      0.0 - 0.7 10e9/L 0.2   Absolute Basophils      0.0 - 0.2 10e9/L 0.0   Color Urine       Yellow   Appearance Urine       Clear   Glucose Urine      NEG mg/dL Negative   Bilirubin Urine      NEG Negative   Ketones Urine      NEG mg/dL Negative   Specific Gravity Urine      1.003 - 1.035 1.010   pH Urine      5.0 - 7.0 pH 5.5   Protein Albumin Urine      NEG mg/dL Negative   Urobilinogen Urine      0.2 - 1.0 EU/dL 0.2   Nitrite Urine      NEG Negative   Blood Urine      NEG Negative   Leukocyte Esterase Urine      NEG Negative   Source       Midstream Urine   WBC Urine      0 - 2 /HPF O - 2   RBC Urine      0 - 2 /HPF O - 2   Squamous Epithelial /LPF Urine      FEW /LPF Few   Bacteria Urine      NEG /HPF Few (A)   Creatinine      0.52 - 1.04 mg/dL 1.19 (H)   GFR Estimate      >60 mL/min/1.7m2 48 (L)   GFR Estimate If Black      >60 mL/min/1.7m2 58 (L)   Protein Random Urine      g/L <0.05   Protein Total Urine g/gr Creatinine      0 - 0.2 g/g Cr Unable to calculate due to low value   Myeloperoxidase Antibody IgG      0.0 - 0.9 AI 1.9 (H)   Proteinase 3 Antibody IgG      0.0 - 0.9 AI <0.2 . . .   ALT      0 - 50 U/L 29   AST      0 - 45 U/L 18   Albumin      3.4 - 5.0 g/dL 4.6   CRP Inflammation      0.0 - 8.0 mg/L 6.1   Sed Rate      0 - 30 mm/h 13   Creatinine Urine Random      mg/dL 54   Neutrophil Cytoplasmic IgG Antibody       <1:20 . . .   Creatinine Urine      mg/dL 54       Patient Name: FAVIO MARTINEZ   MR#: [id number]   Specimen #: [id number]   Collected: 8/4/2017   Received: 8/4/2017   Reported: 8/9/2017 16:19   Ordering Phy(s): CRISTHIAN FLORES     For improved result formatting, select 'View Enhanced Report Format'   under Linked Documents section.     SPECIMEN(S):   Eye, right lacrimal  "gland     FINAL DIAGNOSIS:   Eye, right, \"lacrimal gland\", biopsy   - Dense fibrosis and patchy inflammation   - No residual lacrimal gland seen     COMMENT:   Sections show tissue involved by dense fibrosis and patchy inflammation.   There is no morphologic or immunohistochemical evidence of lymphoma. By   separate report, concurrent flow cytometry studies ([id number]),   performed at the [facility], show polytypic B cells and no   aberrant immunophenotype on T cells. Immunohistochemical stains for IgG   and IgG-4 were performed, which provide no support for IgG-4-related   disease. Some of these changes may be related to prior surgery. Sjögren   disease is not excluded. There is no evidence of malignancy. Dr. Max Conway has reviewed this case and concurs.     Electronically signed out by:     Antonella Beth M.D.   INDICATION:  Right eye edema. Reported history of right orbital biopsy yielding pathology consistent with idiopathic inflammation.    TECHNIQUE:  Noncontrast CT images were obtained through the brain and facial bones.    COMPARISON:  CT sinus 03/27/2017, MRI brain and orbits 02/15/2016.     FINDINGS:  CT facial bones:   Large soft tissue lesion centered within the lateral intraconal and extraconal right orbit has significantly increased in size compared to the CT dated 03/27/2017. The lesion is inseparable from the right superior, lateral, and inferior rectus muscles, as well as the right lacrimal gland. The lesion demonstrates extension posteriorly slightly proximal to the orbital apex, as well as extension into the medial orbit superiorly and anteriorly. Mass effect includes medial bowing of the optic nerve sheath complex and pronounced proptosis of the right globe.  No mass is identified in the right orbit.  Torus palatinus.   Minimal mucosal thickening in the ethmoid air cells. The remainder of the visualized paranasal sinuses are clear.  CT brain:  The ventricles and sulci within " normal limits for patient age. No mass effect or midline shift. The gray-white differentiation is maintained.  No acute intracranial hemorrhage or pathologic extra-axial fluid collection.  The calvarium is intact. The mastoid air cells are clear.    IMPRESSION:  1. Large soft tissue lesion centered within the lateral intraconal and extraconal right orbit has significantly increased in size compared to the CT dated 03/27/2017. The lesion is inseparable from the right lacrimal gland and rectus musculature. Mass effect includes medial bowing of the optic nerve sheath complex and pronounced proptosis of the right globe. Given provided history, findings may represent a progressive inflammatory etiology (pseudotumor). Other differential considerations, such as sarcoidosis or lymphoma, could also have this appearance.  2. No acute intracranial hemorrhage or intracranial mass effect.    Please note that all CT scans at this facility use dose modulation, iterative reconstruction, and/or weight-based dosing when appropriate to reduce radiation dose to as low as reasonably achievable.    Dictated by Charles Ward MD @ Jul 16 2018  3:54PM    Signed by Dr. Charles Ward @ Jul 16 2018  4:20PM    CT ORBITAL WO CONTRAST 7/11/2019 3:42 PM     History: orbital pseudotumor; Subjective visual disturbance; Visual  field defect; Idiopathic orbital inflammatory syndrome  ICD-10: Subjective visual disturbance; Visual field defect; Idiopathic  orbital inflammatory syndrome     Comparison: CT 3/6/2019 and 7/16/2018, MRI brain 5/9/2017                                                                   Impression:   1. 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  2. Slightly decreased right orbital proptosis since 3/6/2019.  Component      Latest Ref Rng & Units  10/29/2019   WBC      4.0 - 11.0 10e9/L 11.6 (H)   RBC Count      3.8 - 5.2 10e12/L 4.73   Hemoglobin      11.7 - 15.7 g/dL 12.3   Hematocrit      35.0 - 47.0 % 39.1   MCV      78 - 100 fl 83   MCH      26.5 - 33.0 pg 26.0 (L)   MCHC      31.5 - 36.5 g/dL 31.5   RDW      10.0 - 15.0 % 13.8   Platelet Count      150 - 450 10e9/L 371   Diff Method       Automated Method   % Neutrophils      % 75.6   % Lymphocytes      % 14.8   % Monocytes      % 5.3   % Eosinophils      % 3.5   % Basophils      % 0.5   % Immature Granulocytes      % 0.3   Nucleated RBCs      0 /100 0   Absolute Neutrophil      1.6 - 8.3 10e9/L 8.8 (H)   Absolute Lymphocytes      0.8 - 5.3 10e9/L 1.7   Absolute Monocytes      0.0 - 1.3 10e9/L 0.6   Absolute Eosinophils      0.0 - 0.7 10e9/L 0.4   Absolute Basophils      0.0 - 0.2 10e9/L 0.1   Abs Immature Granulocytes      0 - 0.4 10e9/L 0.0   Absolute Nucleated RBC       0.0   Color Urine       Yellow   Appearance Urine       Clear   Glucose Urine      NEG:Negative mg/dL 50 (A)   Bilirubin Urine      NEG:Negative Negative   Ketones Urine      NEG:Negative mg/dL 5 (A)   Specific Gravity Urine      1.003 - 1.035 1.023   Blood Urine      NEG:Negative Negative   pH Urine      5.0 - 7.0 pH 5.0   Protein Albumin Urine      NEG:Negative mg/dL Negative   Urobilinogen mg/dL      0.0 - 2.0 mg/dL 0.0   Nitrite Urine      NEG:Negative Negative   Leukocyte Esterase Urine      NEG:Negative Negative   Source       Unspecified Urine   WBC Urine      0 - 5 /HPF <1   RBC Urine      0 - 2 /HPF 2   Mucous Urine      NEG:Negative /LPF Present (A)   Hyaline Casts      0 - 2 /LPF 5 (H)   IGG      695 - 1,620 mg/dL 682 (L)   IGA      70 - 380 mg/dL 238   IGM      60 - 265 mg/dL 46 (L)   Creatinine      0.52 - 1.04 mg/dL 1.04   GFR Estimate      >60 mL/min/1.73:m2 61   GFR Estimate If Black      >60 mL/min/1.73:m2 71   Neutrophil Cytoplasmic Antibody      <1:10 titer <1:10   Neutrophil Cytoplasmic Antibody Pattern       The  ANCA IFA is <1:10.  No further testing will be performed.   CD19 B Cells      6 - 27 % <1 (L)   Absolute CD19      107 - 698 cells/uL <1 (L)   Protein Random Urine      g/L 0.26   Protein Total Urine g/gr Creatinine      0 - 0.2 g/g Cr 0.13   Myeloperoxidase Antibody IgG      0.0 - 0.9 AI 1.2 (H)   Proteinase 3 Antibody IgG      0.0 - 0.9 AI <0.2   Magnesium      1.6 - 2.3 mg/dL 1.7   Ferritin      8 - 252 ng/mL 67   Vitamin B12      193 - 986 pg/mL 592   Vitamin D Deficiency screening      20 - 75 ug/L 29   Sed Rate      0 - 30 mm/h 10   CRP Inflammation      0.0 - 8.0 mg/L <2.9   Albumin      3.4 - 5.0 g/dL 4.3   ALT      0 - 50 U/L 26   AST      0 - 45 U/L 17   Creatinine Urine      mg/dL 207     Component      Latest Ref Rng & Units 12/5/2019   WBC      4.0 - 11.0 10e9/L    RBC Count      3.8 - 5.2 10e12/L    Hemoglobin      11.7 - 15.7 g/dL    Hematocrit      35.0 - 47.0 %    MCV      78 - 100 fl    MCH      26.5 - 33.0 pg    MCHC      31.5 - 36.5 g/dL    RDW      10.0 - 15.0 %    Platelet Count      150 - 450 10e9/L    Diff Method          % Neutrophils      %    % Lymphocytes      %    % Monocytes      %    % Eosinophils      %    % Basophils      %    % Immature Granulocytes      %    Nucleated RBCs      0 /100    Absolute Neutrophil      1.6 - 8.3 10e9/L    Absolute Lymphocytes      0.8 - 5.3 10e9/L    Absolute Monocytes      0.0 - 1.3 10e9/L    Absolute Eosinophils      0.0 - 0.7 10e9/L    Absolute Basophils      0.0 - 0.2 10e9/L    Abs Immature Granulocytes      0 - 0.4 10e9/L    Absolute Nucleated RBC          Color Urine          Appearance Urine          Glucose Urine      NEG:Negative mg/dL    Bilirubin Urine      NEG:Negative    Ketones Urine      NEG:Negative mg/dL    Specific Gravity Urine      1.003 - 1.035    Blood Urine      NEG:Negative    pH Urine      5.0 - 7.0 pH    Protein Albumin Urine      NEG:Negative mg/dL    Urobilinogen mg/dL      0.0 - 2.0 mg/dL    Nitrite Urine      NEG:Negative     Leukocyte Esterase Urine      NEG:Negative    Source          WBC Urine      0 - 5 /HPF    RBC Urine      0 - 2 /HPF    Mucous Urine      NEG:Negative /LPF    Hyaline Casts      0 - 2 /LPF    IGG      695 - 1,620 mg/dL 616 (L)   IGA      70 - 380 mg/dL 214   IGM      60 - 265 mg/dL 33 (L)   Creatinine      0.52 - 1.04 mg/dL    GFR Estimate      >60 mL/min/1.73:m2    GFR Estimate If Black      >60 mL/min/1.73:m2    Neutrophil Cytoplasmic Antibody      <1:10 titer    Neutrophil Cytoplasmic Antibody Pattern          CD19 B Cells      6 - 27 % <1   Absolute CD19      107 - 698 cells/uL <1   Protein Random Urine      g/L    Protein Total Urine g/gr Creatinine      0 - 0.2 g/g Cr    Myeloperoxidase Antibody IgG      0.0 - 0.9 AI    Proteinase 3 Antibody IgG      0.0 - 0.9 AI    Magnesium      1.6 - 2.3 mg/dL    Ferritin      8 - 252 ng/mL    Vitamin B12      193 - 986 pg/mL    Vitamin D Deficiency screening      20 - 75 ug/L    Sed Rate      0 - 30 mm/h    CRP Inflammation      0.0 - 8.0 mg/L    Albumin      3.4 - 5.0 g/dL    ALT      0 - 50 U/L    AST      0 - 45 U/L    Creatinine Urine      mg/dL        ROS:  A comprehensive ROS was done, positives are per HPI.        HISTORY REVIEW:  Past Medical History:   Diagnosis Date     Abnormal glandular Papanicolaou smear of cervix 1992     Allergic rhinitis     Allergy, airborne subst     Arthritis      ASCVD (arteriosclerotic cardiovascular disease)      Chronic pain      Chronic pancreatitis (H)     idiopathic, Tx: PPI, antioxidants, pancreatic enzymes     Common migraine      Coronary artery disease      Costochondritis      Difficulty in walking(719.7)      Dyspnea on exertion      Ectasia, mammary duct     [facility], persistent nipple discharge     Elevated fasting glucose      Gastro-oesophageal reflux disease      Hernia      History of angina      Hyperlipidemia LDL goal < 100      Hypertension goal BP (blood pressure) < 140/90     Essential  hypertension     Iron deficiency anemia      Ischemic cardiomyopathy      Menorrhagia      Migraine headaches      Mild persistent asthma      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 Matias, neg w/u, Rx'd w/prednisone     Ventral hernia, unspecified, without mention of obstruction or gangrene 2012     Past Surgical History:   Procedure Laterality Date     C ECHO HEART XTHORACIC,COMPLETE, W/O DOPPLER  04    [facility] WNL, EF 60%     C/SECTION, LOW TRANSVERSE           CARDIAC SURGERY      cardiac stent- recent in 16; 4 other stents     DILATION AND CURETTAGE N/A 2016    Procedure: DILATION AND CURETTAGE;  Surgeon: Nahed Butler MD;  Location: [facility]     HC UGI ENDOSCOPY W EUS  08    Dr. Pastrana, possible chronic pancreatitis, fatty liver     HERNIA REPAIR  2012    done at [facility] [place]     INSERT INTRAUTERINE DEVICE N/A 2016    Procedure: INSERT INTRAUTERINE DEVICE;  Surgeon: Nahed Butler MD;  Location: [facility]     INT UTERINE FIBRIOD EMBOLIZATION  10/29/2014     LAPAROSCOPIC CHOLECYSTECTOMY  08    Dr. Arnol GRUBBS TX, CERVICAL      s/p LEEP     ORBITOTOMY Right 3/15/2016    Procedure: ORBITOTOMY;  Surgeon: Myron Cyr MD;  Location: [facility]     ORBITOTOMY Right 2017    Procedure: ORBITOTOMY;  RIGHT ORBITOTOMY  AND BIOPSY;  Surgeon: Charis Holbrook MD;  Location:  [facility]     REPAIR PTOSIS Right 2017    Procedure: REPAIR PTOSIS;  RIGHT UPPER LID PTOSIS REPAIR;  Surgeon: Myron Cyr MD;  Location:  [facility]     UPPER GI ENDOSCOPY  10/21/08    mild gastritis, Dr. Hidalgo     Family History   Problem Relation Age of Onset     Heart Disease Father 50        heart attack     Cerebrovascular Disease Father      Diabetes Father      Hypertension Father      Depression Father      C.A.D. Father      Hypertension Mother      Arthritis Mother      Heart Disease Mother         long qt syndrome     Thyroid Disease Mother      C.A.D. Mother      Heart Disease Brother 15        MI at 15, 16.      Diabetes Maternal Uncle      Hypertension Maternal Aunt      Hypertension Maternal Uncle      Arthritis Brother         he passed away, had severe arthritis at age 11     Arthritis Maternal Uncle      Eye Disorder Maternal Uncle         cataracts     Neurologic Disorder Sister         migraines     Neurologic Disorder Sister         migraines     Respiratory Son         asthma     Cerebrovascular Disease Maternal Uncle      C.A.D. Brother      Family History Negative Other         neg for RA, SLE     Unknown/Adopted No family hx of         unknown neurological issues in her family, mother was adopted     Skin Cancer No family hx of         No known family hx of skin cancer     Social History     Socioeconomic History     Marital status: Single     Spouse name: Not on file     Number of children: 1     Years of education: Not on file     Highest education level: Not on file   Occupational History     Employer: NONE    Social Needs     Financial resource strain: Not on file     Food insecurity:     Worry: Not on file     Inability: Not on file     Transportation needs:     Medical: Not on file     Non-medical: Not on file   Tobacco Use     Smoking status: Current Every Day Smoker     Packs/day: 0.20     Years: 1.00     Pack  years: 0.20     Types: Cigarettes     Last attempt to quit: 2016     Years since quittin.0     Smokeless tobacco: Never Used   Substance and Sexual Activity     Alcohol use: No     Alcohol/week: 0.0 standard drinks     Drug use: No     Sexual activity: Not Currently   Lifestyle     Physical activity:     Days per week: Not on file     Minutes per session: Not on file     Stress: Not on file   Relationships     Social connections:     Talks on phone: Not on file     Gets together: Not on file     Attends Sabianism service: Not on file     Active member of club or organization: Not on file     Attends meetings of clubs or organizations: Not on file     Relationship status: Not on file     Intimate partner violence:     Fear of current or ex partner: Not on file     Emotionally abused: Not on file     Physically abused: Not on file     Forced sexual activity: Not on file   Other Topics Concern     Parent/sibling w/ CABG, MI or angioplasty before 65F 55M? Yes   Social History Narrative    Balanced Diet - sometimes    Osteoporosis Prevention Measures - Dairy servings per day: 2 servings weekly    Regular Exercise -  Yes Describe walking 4 times a week    Dental Exam - NO    Seatbelts used - Yes    Self Breast Exam - Yes    Abuse: Current or Past (Physical, Sexual or Emotional)- No    Do you have any concerns about STD's -  No    Do you feel safe in your environment - No    Guns stored in the home - No    Sunscreen used - Yes    Lipids -  YES - Date:     Glucose -  YES - Date:     Eye Exam - YES - Date: one year ago    Colon Cancer Screening - No    Hemoccults - NO    Pap Test -  YES - Date: , remote history of LEEP    Mammography - YES - Date: last spring, would like to discuss, needs a referral [facility]    DEXA - NO    Immunizations reviewed and up to date - Yes, last td given in  or      Patient Active Problem List   Diagnosis     Seasonal affective disorder (H)      Allergic rhinitis     PCOS (polycystic ovarian syndrome)     Moderate persistent asthma     Chronic pancreatitis (H)     Hypertension goal BP (blood pressure) < 140/90     Common migraine     NSTEMI (non-ST elevated myocardial infarction) (H)     Hyperlipidemia LDL goal <70     ASCVD (arteriosclerotic cardiovascular disease)     GERD (gastroesophageal reflux disease)     Ischemic cardiomyopathy     Hypertensive heart disease     Uterine leiomyoma     Iron deficiency anemia     Costochondritis     Vitamin D deficiency     Breast cancer screening     Spinal stenosis in cervical region     Fibromyalgia     Seronegative rheumatoid arthritis (H)     Type 2 diabetes, HbA1C goal < 8% (H)     Type 2 diabetes mellitus with other specified complication (H)     Hyperlipidemia associated with type 2 diabetes mellitus (H)     Hypertension associated with diabetes (H)     Overweight with body mass index (BMI) 25.0-29.9     Tobacco use disorder     Telogen effluvium     CAD S/P percutaneous coronary angioplasty     Status post coronary angiogram     ANCA-associated vasculitis (H)     Wegener's vasculitis (H)     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 All misscarriages were in first trimester. H/o OC use in the past. 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    PMHx, FHx, SHx were reviewed, unchanged.      Outpatient Encounter Medications as of 2020   Medication Sig Dispense Refill     acetaminophen (TYLENOL) 325 MG tablet Take 1-2 tablets (325-650 mg) by mouth every 6 hours as needed for pain (headache) 250 tablet 0     albuterol (2.5 MG/3ML) 0.083% neb solution INL 1 VIAL VIA NEBULIZATION Q 4 TO 6 HOURS PRN  1     albuterol (PROAIR HFA, PROVENTIL HFA, VENTOLIN HFA) 108 (90 BASE) MCG/ACT inhaler Inhale 2 puffs into the lungs every 6 hours as needed for shortness of breath / dyspnea or wheezing 3 Inhaler 1     aspirin (ASPIRIN LOW DOSE) 81 MG  EC tablet Take 1 tablet (81 mg) by mouth daily 30 tablet 11     blood glucose monitoring (NO BRAND SPECIFIED) meter device kit Use to test blood sugar 4 X times daily or as directed. (Patient taking differently: 1 kit Use to test blood sugar 4 X times daily or as directed.) 1 kit 0     blood glucose monitoring (NO BRAND SPECIFIED) test strip 1 strip by In Vitro route 4 times daily Test as directed. Please dispense three months and three months of refills. Thank you. (Patient taking differently: 1 strip by In Vitro route 4 times daily Test as directed. Please dispense three months and three months of refills. Thank you.) 360 each 3     Blood Pressure Monitor KIT 1 each daily Monitor home blood pressure as instructed by physician.  Dispense [place] blood pressure kit. 1 kit 0     calcium carbonate (TUMS) 500 MG chewable tablet Take 3-4 chew tab by mouth daily as needed.       clopidogrel (PLAVIX) 75 MG tablet Take 1 tablet (75 mg) by mouth daily 30 tablet 11     COMPRESSION STOCKINGS 2 each daily Apply one 10-15 mmHg compression stocking to each lower extgmierty during the day and remove before bedtime. 4 each 2     cyclobenzaprine (FLEXERIL) 10 MG tablet Take 1 tablet (10 mg) by mouth 2 times daily as needed for muscle spasms 60 tablet 2     cycloSPORINE (RESTASIS) 0.05 % ophthalmic emulsion Place 1 drop into both eyes 2 times daily 60 each 11     cycloSPORINE (RESTASIS) 0.05 % ophthalmic emulsion Place 1 drop into the right eye every 12 hours       desonide (DESOWEN) 0.05 % cream Apply topically 2 times daily       diclofenac (VOLTAREN) 1 % topical gel Apply 2 g topically 4 times daily Apply to affected joints 100 g 3     dicyclomine (BENTYL) 20 MG tablet TAKE 1 TABLET(20 MG) BY MOUTH FOUR TIMES DAILY AS NEEDED. Pls schedule clinic appt for refills. 60 tablet 0     diphenhydrAMINE (BENADRYL) 25 MG capsule TK 1 TO 2 CS PO QHS  4     EPIPEN 2-RIKY 0.3 MG/0.3ML injection INJECT 0.3 MG INTO THE MUSCLE PRF ANAPHYALAXIS  0      ferrous gluconate (FERGON) 324 (38 Fe) MG tablet Take 1 tablet (324 mg) by mouth 2 times daily (with meals) DO NOT CRUSH. Absorbed best on an empty stomach. If stomach upset occurs, can take with meals. For additional refills, please schedule a follow-up appointment with Dr Solano at [phone] 180 tablet 0     fexofenadine (ALLEGRA) 180 MG tablet Take 1 tablet by mouth daily as needed. 90 tablet 3     fluocinolone (SYNALAR) 0.01 % solution Apply topically daily as needed       fluticasone-vilanterol (BREO ELLIPTA) 100-25 MCG/INH inhaler Inhale 1 puff into the lungs daily       hydroxychloroquine (PLAQUENIL) 200 MG tablet Take 2 tablets (400 mg) by mouth daily 180 tablet 1     insulin pen needle (BD MARCK U/F) 32G X 4 MM USE DAILY OR AS DIRECTED 300 each 3     ketoconazole (NIZORAL) 2 % shampoo Apply topically daily as needed       Ketoprofen POWD 1 g 2 times daily as needed (prn pain) 500 g 3     lidocaine (LMX4) 4 % external cream Apply topically once as needed for mild pain (prn pain) 30 g 3     lifitegrast (XIIDRA) 5 % opthalmic solution Place 1 drop into both eyes 2 times daily 20 each 3     lisinopril-hydrochlorothiazide (PRINZIDE/ZESTORETIC) 20-25 MG tablet Take 1 tablet by mouth daily 30 tablet 11     Magnesium Oxide -Mg Supplement 250 MG TABS TK 1 T PO BID. REDUCE IF STOOLS LOOSEN  11     metFORMIN (GLUCOPHAGE-XR) 500 MG 24 hr tablet TAKE 2 TABLETS(1000 MG) BY MOUTH DAILY WITH DINNER (Patient taking differently: Take 2,000 mg by mouth daily ) 180 tablet 0     metoclopramide (REGLAN) 10 MG tablet Take 1 tablet (10 mg) by mouth 4 times daily (before meals and nightly) 90 tablet 0     metoprolol succinate ER (TOPROL-XL) 100 MG 24 hr tablet Take 1 tablet (100 mg) by mouth daily 30 tablet 11     metroNIDAZOLE (NORITATE) 1 % cream Apply topically daily       montelukast (SINGULAIR) 10 MG tablet Take 1 tablet (10 mg) by mouth At Bedtime 30 tablet 1     Multiple Vitamin (DAILY-GERALD) TABS Take 1 tablet by  mouth daily  0     nitroGLYcerin (NITROSTAT) 0.4 MG sublingual tablet Place 1 tablet (0.4 mg) under the tongue every 5 minutes as needed for chest pain . After 3 doses, if pain persists call 911. 25 tablet 3     nystatin (MYCOSTATIN) 156702 UNITS TABS tablet TK 2 TS PO BID  0     ondansetron (ZOFRAN-ODT) 8 MG ODT tab Take 1 tablet (8 mg) by mouth every 8 hours as needed for nausea 60 tablet 1     pantoprazole (PROTONIX) 40 MG EC tablet Take 1 tablet (40 mg) by mouth daily Pork free tablets. (Patient taking differently: Take 40 mg by mouth as needed Pork free tablets.) 30 tablet 1     pravastatin (PRAVACHOL) 40 MG tablet Take 1 tablet (40 mg) by mouth daily 30 tablet 5     spironolactone (ALDACTONE) 50 MG tablet TAKE 1 TABLET BY MOUTH EVERY DAY TAKE ADDITIONAL 1/2 TABLET BY MOUTH EVERY DAY AS NEEDED FOR WEIGHT GAIN 30 tablet 6     sucralfate (CARAFATE) 1 GM tablet Take 1 tablet (1 g) by mouth 4 times daily 360 tablet 0     traMADol (ULTRAM) 50 MG tablet Take 1 tablet (50 mg) by mouth every 8 hours as needed for moderate pain 60 tablet 0     Triamcinolone Acetonide (NASACORT ALLERGY 24HR NA)        vitamin D2 (ERGOCALCIFEROL) 35115 units (1250 mcg) capsule Take 1 capsule (50,000 Units) by mouth every 7 days 12 capsule 0     folic acid (FOLVITE) 1 MG tablet Take 1 tablet by mouth daily (Patient not taking: Reported on 10/29/2019) 90 tablet 3     ranitidine (ZANTAC) 150 MG tablet Take 1 tablet (150 mg) by mouth 2 times daily (Patient not taking: Reported on 10/29/2019) 180 tablet 1     No facility-administered encounter medications on file as of 2/21/2020.        Allergies   Allergen Reactions     Amoxicillin-Pot Clavulanate      Augmentin      Unknown possible hives and edema     Azithromycin      Diatrizoate Other (See Comments)     Pt wants this listed because she is allergic to shellfish      Imitrex [Sumatriptan]      Severe face/neck/chest tightness and flushing side effects      Penicillins Hives     Unknown       Pork Allergy      Stomach pain, cramping, diarrhea, itching, nausea and headaches     Shellfish Allergy Hives and Swelling     Sulfa Drugs Hives and Swelling     Zithromax [Azithromycin Dihydrate] Swelling     Unknown          Ph.E:    Vitals:    02/21/20 0941   BP: 107/71   BP Location: Left arm   Patient Position: Sitting   Cuff Size: Adult Regular   Pulse: 87   SpO2: 98%   Weight: 78.3 kg (172 lb 11.2 oz)           Constitutional: WD/WN. Pleasant. In no acute distress.   Eyes: Swelling around R eye significantly improved, not clear swelling today. EOMI.   HEENT: No oral ulcers or thrush. Normal salivary pool.  Lymph: No cervical, supraclavicular, or axillary LAD.  Chest: Clear to auscultation bilaterally, Normal work of breathing.  CV: RRR, no murmurs/ rubs or gallops. No edema, clubbing or cyanosis.   GI: Abdomen is soft and non tender.  MS: No synovitis or joint tenderness. Cool joints. No joint deformities. Full ROM of the joints. No nodules.   Skin: No rashes or lesions noted. No malar rash.  Neuro: A&O x 3. Grossly non focal, muscular power 5/5 in all ext  Psych: NL affect and mood    Assessment/ plan:    1-Seropositive non-erosive RA (arthritis, AM stiffness, high CRP, RF 26 but re-check neg 3/2015 on HCQ) Dx 5/2013, FMS, new pleuritic CP 3/20-15-5/2015 resolved on steroids. GPA. She is on  mg bid since 5/2014. She tolerates it well and it's helping some but not enough to control all her pain. Continues having flare ups of joint pain, swelling also has FMS. Joint sx are getting worse. Had high ESR/CRP in 3/2015 and new pleuritic CP between 3/2015-5/2015 which resolved after taking medrol dose pack prescribed 5/20/2015. Her pleuritic CP could be related to her RA. Then stopped tramadol as it blames it for recent episodes of CP.    In the past, MTX was recommended, she declined.    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 Etiology of mass unclear, but it's inflammatory and related to pt's underlying autoimmune disease (inflammatory arthritis, serositis). 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    Her work up showed borderline + ALLI and slightly elevated MPO. I told her prednisone is not good for her diabetes and weight gain. Recommended a trial of MTX as next step. Discussed risks on details. I called her neuro-ophthalmologist at last visit who agreed with trial of MTX (she suspects pseudo-sarcoidosis or sarcoid like disease but no granuloma and ACE not elevated).     Unfortunately despite all my efforts to explain risks vs benefits (more than risks) of MTX as steroid sparing gent, Janine declined to try MTX. I was very concerned about her R orbital inflammatory mass as there is high chance of flare up off steroids and steroid causes her weigh gain and uncontrolled diabetes. I even offered her other steroid sparing gents like imuran. She declined that as well.    Her inflammation around R orbit has recurred now. On 4/7/2017, I spent quite amount of time discussing a trial of MTX. After discussing risks/benefits, she agreed to try it.     She is s/p Tx with MTX 10 mg po qwk 4/2017-5/2017. No benefits and more GI SEs, more hair loss and headaches since start of MTX. No benefits. I called and spoke with her neuro-ophthalmologist who recommended a second opinion from neuro-ophthalmology at the . I suspect her presentation to be ANCA vasculitis related but wanted to repeat imaging and get neuro-ophthalmology eval here. She was recommended to have repeat biopsy to r/o lymphoma.    In 5/2017, prescribed her prednisone 40-30-20-10 mg a day each for 3 days then stop (she declined higher dose and longer taper despite my concern for worsening swelling around orbit), Her R campos-orbital edema  significantly improved. MTX was stopped in 5/2017 given side effects. Plan was to start imuran 50 mg po qd (NL TPMT); however we decided to hold off till she gets biopsy done.    Repeat R lacrimal gland biopsy in 8/2017 showed non specific inflammation, it ruled out lymphoma. Her R orbital swelling is improved but still present. After her biopsy I contacted her to schedule a f/u visit with me to discuss plan of care but she declined till today's visit.    She did not tolerate AZA because of GI SEs.    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     This is definitely an inflammatory process and is steroid responsive, she had local kenalog inj in 8/2017 at the time of biopsy which has helped. Given her diabetes and long term SE of prednisone, highly recommend steroid sparing agent to prevent progression/recurrence of R campos-orbital swelling. Since she did not tolerate MTX and AZA, recommend rituximab as next step.     In 2/2018, I spent 30 minutes discussing test results, plan of care, rituximab SEs including rare risk of PML. She declined rituximab with concern for SEs.    Unfortunately lost f/u sine 2/2018. In 9/2018, was back with her R eye being much worse, swelling around R orbit was severe and is pushing down her eye. She decided to see an ophthalmologist at [facility], was seen 8/29, was told to have GPA.    Janine is frustrated with her eye condition, saying nobody told her that she has GPA or Wegener's which is a serious condition and people could die from it.    I reminded her that I discussed the Dx of ANCA-vasculitis which is just another name for Wegener's with her many times but she always declined induction Tx rituximab at last visit in 9/2018. I put her on prednisone 30 mg every day  in 9/2018, now she is off, stopped 6 wk after surgery. Does not like SEs including worsening BG.    CT chest/abdomen/pelvis 4/2017 was neg for malignancy. 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 Repeat MPO was positive in 6/2017.    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 Shah and Dr. Valdez at [facility] [facility]. 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 Not happy with results, on my exam, her eye swelling/pain significantly improved. She wants to transfer care to here, I advised her to make an appointment with Dr. Saulo GREEN for f/u and referred her to Dr. Vasquez to assess if she has sinus involvement by GPA given facial pain.    After my original recommendation to receive rituximab (FDA approved for GPA) in 2/2018. She finally agreed to it, had 1 gr q2wk x 2 on 12/12/2018 and 12/26/2018. Had minor allergic reaction which was managed by extra dose of steroid and benadryl. She refuses to do prednisone taper given h/o diabetes, GIB on prednisone. I told her it would take 1 mo for rituximab to show benefit, if she continues to have active disease, recommend trial of cytoxan.     Patient received Rituxan again (6/3/19 & 6/17/19) 6 months after first round and last on 12/5/2019 and 12/19/2019. Repeat CT scan show continued inflammatory changes in the R orbit, but decreased proptosis. We discussed cytoxan again but she said it is out of question and she declined considering it. Therefore, we continued with q6mo rituximab with hope that inflammation goes down. Patient is not on any prednisone. 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 Per eye exam 1/2020, responding to rituximab, no longer has proptosis, will continue with rituximab. Has resp sx since early Feb.    Today reports hemoptysis (muscous was clear with streaks of blood from coughing s hard, son was sick 1st, Janine had flu shot)) which is resolved, now dry cough, fever is resolved, no longer has CP. Is  scheduled to get cardiac cath next wk but wants to cancel as thinks it's invasive. Reports diffuse edema.    10/2019 labs were stable with NL ESR/CRP, MPO slightly above NL range.    I don't think lung sx are due to GPA as she just had rituximab in 12/2019 and sx are improving without any intervention, but will get a CXR and consider chest CT. Will check labs.    Today's plan:    CXR today    Labs today    Follow up with Dr. Vernon     Lidocaine cream and ketoprofen powder for knee  pain    Labs today, recommend to give rituximab 1 gram every 2 weeks x 2 in 6/2020 with very slow infusion to avoid reaction.    Continue accupuncture (referral was placed as it helps with chronic pain).     My impression is that her arthralgias are a combination of IA, fibromyalgia and chronic pain.  Stopped HCQ at last visit, shortly after resumed taking it as arthralgia recurred. Continue HCQ. Eye exam is updated.    2-Fad pads. She was seen by endocrinology and cushing was ruled out in 4/2014. Was advised to do f/u for enlarged thyroid/thyroid nodules.    3-Hair loss. F/U with dermatology    4-Chronic pain. F/U with pain clinic    5-Concerns of subclinical hyperthyroidism: TSH is barely minimal, chart review shows that those lowest levels are since April 2014. At last visit, discussed Endocrinology ref which pt wants to hold off in this visit.     6-Vit D deficiency. Was replaced with vit D 50, 000 units po qwk x 12 wk then 2000 units every day    7-Upper extremity swelling. Recent mammogram without suggestion of malignancy. No LAD of axillary region. Arms appear normal on physical exam, however patient reports intermittent swelling.  - Referral to lymphedema clinic was done in the past.      PLAN: Return in 3-4 months       MEDICATION CHANGES: as above        Orders Placed This Encounter   Procedures     X-ray Chest 2 views     AST     ALT     Myeloperoxidase and Proteinase 3 Panel     Albumin level     CBC with platelets differential      Creatinine     CRP inflammation     UA with Microscopic reflex to Culture     Protein  random urine with Creat Ratio     Creatinine random urine     Erythrocyte sedimentation rate auto     CD19 B Cell Count     ANCA IgG by IFA with Reflex to Titer     Immunoglobulins A G and M     Vitamin D Deficiency       Majo Mckinnon MD

## 2020-02-24 LAB
ANCA AB PATTERN SER IF-IMP: NORMAL
C-ANCA TITR SER IF: NORMAL {TITER}

## 2020-02-26 ENCOUNTER — TELEPHONE (OUTPATIENT)
Dept: RHEUMATOLOGY | Facility: CLINIC | Age: 55
End: 2020-02-26

## 2020-02-26 NOTE — TELEPHONE ENCOUNTER
----- Message from Majo Mckinnon MD sent at 2/21/2020 10:18 AM CST -----  Plan to do rituximab 1 gr q2wk x 2 very slow in 6/2020

## 2020-03-01 RX ORDER — ACETAMINOPHEN 325 MG/1
650 TABLET ORAL ONCE
Status: CANCELLED
Start: 2020-03-01

## 2020-03-01 RX ORDER — METHYLPREDNISOLONE SODIUM SUCCINATE 125 MG/2ML
125 INJECTION, POWDER, LYOPHILIZED, FOR SOLUTION INTRAMUSCULAR; INTRAVENOUS ONCE
Status: CANCELLED | OUTPATIENT
Start: 2020-03-01

## 2020-03-03 DIAGNOSIS — M31.30 WEGENER'S VASCULITIS: ICD-10-CM

## 2020-03-03 RX ORDER — ACETAMINOPHEN 325 MG/1
325-650 TABLET ORAL EVERY 6 HOURS PRN
Qty: 250 TABLET | Refills: 0 | Status: SHIPPED | OUTPATIENT
Start: 2020-03-03 | End: 2021-01-21

## 2020-03-03 NOTE — TELEPHONE ENCOUNTER
acetaminophen (TYLENOL) 325 MG tablet  Take 1-2 tablets (325-650 mg) by mouth every 6 hours as needed for pain (headache)  Last Written Prescription Date:  7/31/2019  Last Fill Quantity: 250,   # refills: 0  Last Office Visit : 2/21/20  Future Office visit:  71/20    Refilled per rheum protocol

## 2020-03-04 DIAGNOSIS — M35.9 UNDIFFERENTIATED CONNECTIVE TISSUE DISEASE (H): ICD-10-CM

## 2020-03-04 RX ORDER — TRAMADOL HYDROCHLORIDE 50 MG/1
50 TABLET ORAL EVERY 8 HOURS PRN
Qty: 60 TABLET | Refills: 0 | Status: SHIPPED | OUTPATIENT
Start: 2020-03-04 | End: 2020-05-04

## 2020-03-04 NOTE — TELEPHONE ENCOUNTER
Last Seen: 2/21/2020  Next Appointment: 7/1/2020  Medication: tramadol 50mg  Last Filled: 1/31/2020  Qty: #60     reviewed as follows:      Request sent to provider for review and signature.    Beulah Fortune CMA   3/4/2020 11:02 AM

## 2020-03-09 NOTE — TELEPHONE ENCOUNTER
Pt returned call, advised pt that infusion orders are in place and she can set up appts when ready, pt will call when she can to schedule for June.    Desiree Godinez RN  [facility]

## 2020-03-22 ENCOUNTER — HEALTH MAINTENANCE LETTER (OUTPATIENT)
Age: 55
End: 2020-03-22

## 2020-05-01 DIAGNOSIS — E55.9 VITAMIN D DEFICIENCY: Primary | ICD-10-CM

## 2020-05-01 NOTE — TELEPHONE ENCOUNTER
vitamin D2 (ERGOCALCIFEROL) 40392 units (1250 mcg) capsule      Last Written Prescription Date:  2/4/20  Last Fill Quantity: 12,   # refills: 0  Last Office Visit : 2/21/20  Future Office visit:  7/1/20    Routing refill request to provider for review/approval because:  Not on refill protocol

## 2020-05-04 ENCOUNTER — TELEPHONE (OUTPATIENT)
Dept: RHEUMATOLOGY | Facility: CLINIC | Age: 55
End: 2020-05-04

## 2020-05-04 DIAGNOSIS — Z51.81 ENCOUNTER FOR MEDICATION MONITORING: ICD-10-CM

## 2020-05-04 DIAGNOSIS — M35.9 UNDIFFERENTIATED CONNECTIVE TISSUE DISEASE (H): ICD-10-CM

## 2020-05-04 DIAGNOSIS — M31.30 GRANULOMATOSIS WITH POLYANGIITIS WITHOUT RENAL INVOLVEMENT (H): ICD-10-CM

## 2020-05-04 DIAGNOSIS — E55.9 VITAMIN D DEFICIENCY: Primary | ICD-10-CM

## 2020-05-04 RX ORDER — ERGOCALCIFEROL 1.25 MG/1
50000 CAPSULE, LIQUID FILLED ORAL
Qty: 12 CAPSULE | Refills: 0 | OUTPATIENT
Start: 2020-05-04

## 2020-05-04 RX ORDER — CHOLECALCIFEROL (VITAMIN D3) 50 MCG
1 TABLET ORAL DAILY
Qty: 90 TABLET | Refills: 1 | Status: SHIPPED | OUTPATIENT
Start: 2020-05-04 | End: 2021-05-18

## 2020-05-04 RX ORDER — TRAMADOL HYDROCHLORIDE 50 MG/1
50 TABLET ORAL EVERY 8 HOURS PRN
Qty: 60 TABLET | Refills: 0 | Status: SHIPPED | OUTPATIENT
Start: 2020-05-04 | End: 2020-07-24

## 2020-05-04 NOTE — TELEPHONE ENCOUNTER
Pt was switched from Vitamin d weekly to vitamin d daily, she is concerned that her vitamin d level will drop.  She is wondering if she can stay on the weekly vitamin d?    She is concerned about getting Rituximab.  She is having quite a bit of bone and muscle pain.  She is having swelling in her lower extremities.  She does have some tingling her lower extremities as well.  Mostly in her left leg, mid thigh down to ankles, prickly painful feeling.  Different from neuropathy pain.  She is concerned about going to infusion center if she doesn't need to.  She is not sure that the rituximab will help this.      Pt is also wondering when her next set of labs should be done?  She is trying to group as many things together as possible.    Desiree Godinez RN  [facility]

## 2020-05-04 NOTE — TELEPHONE ENCOUNTER
Last Seen: 2/21/2020  Next Appointment: 7/1/2020  Medication: tramadol 50mg  Last Filled: 3/5/2020  Qty: #60     reviewed as follows:      Request sent to provider for review and signature.    Beulah Fortune CMA   5/4/2020 1:20 PM

## 2020-06-02 DIAGNOSIS — M19.90 INFLAMMATORY ARTHRITIS: ICD-10-CM

## 2020-06-03 ENCOUNTER — TELEPHONE (OUTPATIENT)
Dept: RHEUMATOLOGY | Facility: CLINIC | Age: 55
End: 2020-06-03
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
## 2020-06-04 RX ORDER — HYDROXYCHLOROQUINE SULFATE 200 MG/1
400 TABLET, FILM COATED ORAL DAILY
Qty: 180 TABLET | Refills: 0 | Status: SHIPPED | OUTPATIENT
Start: 2020-06-04 | End: 2020-09-02

## 2020-06-04 NOTE — TELEPHONE ENCOUNTER
Last Clinic Visit: 2/21/2020  [facility]  Last Eye exam: 1-6-20

## 2020-06-04 NOTE — TELEPHONE ENCOUNTER
Majo Mckinnon MD Beard, Madeline, RN    Caller: Unspecified (Yesterday, 11:39 AM)               No med change, ok  to give rituximab at the end of Dec instead of early Jan.      Left message requesting return call.    Desiree Godinez RN  R[facility]umatology Clinic

## 2020-06-11 DIAGNOSIS — M19.90 INFLAMMATORY ARTHRITIS: ICD-10-CM

## 2020-06-12 RX ORDER — HYDROXYCHLOROQUINE SULFATE 200 MG/1
400 TABLET, FILM COATED ORAL DAILY
Qty: 180 TABLET | Refills: 0 | OUTPATIENT
Start: 2020-06-12

## 2020-06-12 NOTE — TELEPHONE ENCOUNTER
hydroxychloroquine (PLAQUENIL) 200 MG tablet     hydroxychloroquine (PLAQUENIL) 200 MG tablet  180 tablet  0  6/4/2020   No    Sig - Route: Take 2 tablets (400 mg) by mouth daily (Annual eye exam/plaquenil screening) - Oral    Sent to pharmacy as: Hydroxychloroquine Sulfate 200 MG Oral Tablet (Plaquenil)    Class: E-Prescribe    Order: [id number]    E-Prescribing Status: Receipt confirmed by pharmacy (6/4/2020  9:31 AM CDT)        Georgia Mercedes RN  Central Triage Red Flags/Med Refills

## 2020-06-18 ENCOUNTER — INFUSION THERAPY VISIT (OUTPATIENT)
Dept: INFUSION THERAPY | Facility: CLINIC | Age: 55
End: 2020-06-18
Attending: INTERNAL MEDICINE
Payer: COMMERCIAL

## 2020-06-18 VITALS
OXYGEN SATURATION: 99 % | SYSTOLIC BLOOD PRESSURE: 98 MMHG | HEART RATE: 86 BPM | WEIGHT: 165.5 LBS | TEMPERATURE: 98.6 F | BODY MASS INDEX: 29.32 KG/M2 | DIASTOLIC BLOOD PRESSURE: 73 MMHG

## 2020-06-18 DIAGNOSIS — Z79.4 TYPE 2 DIABETES MELLITUS WITH INSULIN THERAPY (H): ICD-10-CM

## 2020-06-18 DIAGNOSIS — I25.5 ISCHEMIC CARDIOMYOPATHY: ICD-10-CM

## 2020-06-18 DIAGNOSIS — E11.69 HYPERLIPIDEMIA ASSOCIATED WITH TYPE 2 DIABETES MELLITUS (H): ICD-10-CM

## 2020-06-18 DIAGNOSIS — I25.10 CAD S/P PERCUTANEOUS CORONARY ANGIOPLASTY: ICD-10-CM

## 2020-06-18 DIAGNOSIS — Z98.61 CAD S/P PERCUTANEOUS CORONARY ANGIOPLASTY: ICD-10-CM

## 2020-06-18 DIAGNOSIS — E78.5 HYPERLIPIDEMIA LDL GOAL <70: ICD-10-CM

## 2020-06-18 DIAGNOSIS — I77.82 ANCA-ASSOCIATED VASCULITIS (H): ICD-10-CM

## 2020-06-18 DIAGNOSIS — E11.9 TYPE 2 DIABETES MELLITUS WITH INSULIN THERAPY (H): ICD-10-CM

## 2020-06-18 DIAGNOSIS — I11.9 HYPERTENSIVE HEART DISEASE WITHOUT HEART FAILURE: ICD-10-CM

## 2020-06-18 DIAGNOSIS — E55.9 VITAMIN D DEFICIENCY: ICD-10-CM

## 2020-06-18 DIAGNOSIS — I21.4 NSTEMI (NON-ST ELEVATED MYOCARDIAL INFARCTION) (H): ICD-10-CM

## 2020-06-18 DIAGNOSIS — I25.10 ASCVD (ARTERIOSCLEROTIC CARDIOVASCULAR DISEASE): ICD-10-CM

## 2020-06-18 DIAGNOSIS — E78.5 HYPERLIPIDEMIA ASSOCIATED WITH TYPE 2 DIABETES MELLITUS (H): ICD-10-CM

## 2020-06-18 DIAGNOSIS — M31.30 GRANULOMATOSIS WITH POLYANGIITIS WITHOUT RENAL INVOLVEMENT (H): Primary | ICD-10-CM

## 2020-06-18 DIAGNOSIS — I10 HYPERTENSION GOAL BP (BLOOD PRESSURE) < 140/90: ICD-10-CM

## 2020-06-18 LAB
ALBUMIN SERPL-MCNC: 3.6 G/DL (ref 3.4–5)
ALBUMIN SERPL-MCNC: 3.6 G/DL (ref 3.4–5)
ALBUMIN UR-MCNC: 30 MG/DL
ALP SERPL-CCNC: 72 U/L (ref 40–150)
ALT SERPL W P-5'-P-CCNC: 29 U/L (ref 0–50)
ALT SERPL W P-5'-P-CCNC: 30 U/L (ref 0–50)
ANION GAP SERPL CALCULATED.3IONS-SCNC: 5 MMOL/L (ref 3–14)
APPEARANCE UR: ABNORMAL
AST SERPL W P-5'-P-CCNC: 23 U/L (ref 0–45)
AST SERPL W P-5'-P-CCNC: 26 U/L (ref 0–45)
BASOPHILS # BLD AUTO: 0 10E9/L (ref 0–0.2)
BASOPHILS NFR BLD AUTO: 0.3 %
BILIRUB SERPL-MCNC: 0.3 MG/DL (ref 0.2–1.3)
BILIRUB UR QL STRIP: NEGATIVE
BUN SERPL-MCNC: 32 MG/DL (ref 7–30)
CALCIUM SERPL-MCNC: 8.6 MG/DL (ref 8.5–10.1)
CHLORIDE SERPL-SCNC: 107 MMOL/L (ref 94–109)
CHOLEST SERPL-MCNC: 102 MG/DL
CO2 SERPL-SCNC: 25 MMOL/L (ref 20–32)
COLOR UR AUTO: ABNORMAL
CREAT SERPL-MCNC: 1.8 MG/DL (ref 0.52–1.04)
CREAT SERPL-MCNC: 1.83 MG/DL (ref 0.52–1.04)
CREAT UR-MCNC: 385 MG/DL
CRP SERPL-MCNC: 4.8 MG/L (ref 0–8)
DIFFERENTIAL METHOD BLD: ABNORMAL
EOSINOPHIL # BLD AUTO: 0 10E9/L (ref 0–0.7)
EOSINOPHIL NFR BLD AUTO: 0.1 %
ERYTHROCYTE [DISTWIDTH] IN BLOOD BY AUTOMATED COUNT: 13.2 % (ref 10–15)
ERYTHROCYTE [SEDIMENTATION RATE] IN BLOOD BY WESTERGREN METHOD: 18 MM/H (ref 0–30)
GFR SERPL CREATININE-BSD FRML MDRD: 31 ML/MIN/{1.73_M2}
GFR SERPL CREATININE-BSD FRML MDRD: 31 ML/MIN/{1.73_M2}
GLUCOSE SERPL-MCNC: 250 MG/DL (ref 70–99)
GLUCOSE UR STRIP-MCNC: NEGATIVE MG/DL
HBA1C MFR BLD: 8 % (ref 0–5.6)
HCT VFR BLD AUTO: 35.9 % (ref 35–47)
HDLC SERPL-MCNC: 30 MG/DL
HGB BLD-MCNC: 11.7 G/DL (ref 11.7–15.7)
HGB UR QL STRIP: NEGATIVE
HYALINE CASTS #/AREA URNS LPF: 38 /LPF (ref 0–2)
IMM GRANULOCYTES # BLD: 0 10E9/L (ref 0–0.4)
IMM GRANULOCYTES NFR BLD: 0.3 %
KETONES UR STRIP-MCNC: 5 MG/DL
LDLC SERPL CALC-MCNC: 50 MG/DL
LEUKOCYTE ESTERASE UR QL STRIP: NEGATIVE
LYMPHOCYTES # BLD AUTO: 0.5 10E9/L (ref 0.8–5.3)
LYMPHOCYTES NFR BLD AUTO: 4.5 %
MCH RBC QN AUTO: 26.5 PG (ref 26.5–33)
MCHC RBC AUTO-ENTMCNC: 32.6 G/DL (ref 31.5–36.5)
MCV RBC AUTO: 81 FL (ref 78–100)
MONOCYTES # BLD AUTO: 0.1 10E9/L (ref 0–1.3)
MONOCYTES NFR BLD AUTO: 0.7 %
MUCOUS THREADS #/AREA URNS LPF: PRESENT /LPF
NEUTROPHILS # BLD AUTO: 10.5 10E9/L (ref 1.6–8.3)
NEUTROPHILS NFR BLD AUTO: 94.1 %
NITRATE UR QL: NEGATIVE
NONHDLC SERPL-MCNC: 71 MG/DL
NRBC # BLD AUTO: 0 10*3/UL
NRBC BLD AUTO-RTO: 0 /100
PH UR STRIP: 5 PH (ref 5–7)
PLATELET # BLD AUTO: 348 10E9/L (ref 150–450)
POTASSIUM SERPL-SCNC: 5.4 MMOL/L (ref 3.4–5.3)
PROT SERPL-MCNC: 7.1 G/DL (ref 6.8–8.8)
PROT UR-MCNC: 0.73 G/L
PROT/CREAT 24H UR: 0.19 G/G CR (ref 0–0.2)
RBC # BLD AUTO: 4.42 10E12/L (ref 3.8–5.2)
RBC #/AREA URNS AUTO: 3 /HPF (ref 0–2)
SODIUM SERPL-SCNC: 136 MMOL/L (ref 133–144)
SOURCE: ABNORMAL
SP GR UR STRIP: 1.03 (ref 1–1.03)
SQUAMOUS #/AREA URNS AUTO: 1 /HPF (ref 0–1)
TRIGL SERPL-MCNC: 104 MG/DL
UROBILINOGEN UR STRIP-MCNC: 0 MG/DL (ref 0–2)
WBC # BLD AUTO: 11.1 10E9/L (ref 4–11)
WBC #/AREA URNS AUTO: 3 /HPF (ref 0–5)

## 2020-06-18 PROCEDURE — 82784 ASSAY IGA/IGD/IGG/IGM EACH: CPT | Performed by: INTERNAL MEDICINE

## 2020-06-18 PROCEDURE — 84450 TRANSFERASE (AST) (SGOT): CPT | Performed by: INTERNAL MEDICINE

## 2020-06-18 PROCEDURE — 84156 ASSAY OF PROTEIN URINE: CPT | Performed by: INTERNAL MEDICINE

## 2020-06-18 PROCEDURE — 81001 URINALYSIS AUTO W/SCOPE: CPT | Performed by: INTERNAL MEDICINE

## 2020-06-18 PROCEDURE — 82040 ASSAY OF SERUM ALBUMIN: CPT | Performed by: INTERNAL MEDICINE

## 2020-06-18 PROCEDURE — 80053 COMPREHEN METABOLIC PANEL: CPT | Performed by: INTERNAL MEDICINE

## 2020-06-18 PROCEDURE — 83876 ASSAY MYELOPEROXIDASE: CPT | Performed by: INTERNAL MEDICINE

## 2020-06-18 PROCEDURE — 83516 IMMUNOASSAY NONANTIBODY: CPT | Performed by: INTERNAL MEDICINE

## 2020-06-18 PROCEDURE — 85025 COMPLETE CBC W/AUTO DIFF WBC: CPT | Performed by: INTERNAL MEDICINE

## 2020-06-18 PROCEDURE — 96413 CHEMO IV INFUSION 1 HR: CPT

## 2020-06-18 PROCEDURE — 25000128 H RX IP 250 OP 636: Mod: ZF | Performed by: INTERNAL MEDICINE

## 2020-06-18 PROCEDURE — 86140 C-REACTIVE PROTEIN: CPT | Performed by: INTERNAL MEDICINE

## 2020-06-18 PROCEDURE — 96415 CHEMO IV INFUSION ADDL HR: CPT

## 2020-06-18 PROCEDURE — 83036 HEMOGLOBIN GLYCOSYLATED A1C: CPT | Performed by: INTERNAL MEDICINE

## 2020-06-18 PROCEDURE — 80061 LIPID PANEL: CPT | Performed by: INTERNAL MEDICINE

## 2020-06-18 PROCEDURE — 82565 ASSAY OF CREATININE: CPT | Performed by: INTERNAL MEDICINE

## 2020-06-18 PROCEDURE — 86255 FLUORESCENT ANTIBODY SCREEN: CPT | Performed by: INTERNAL MEDICINE

## 2020-06-18 PROCEDURE — 25800030 ZZH RX IP 258 OP 636: Mod: ZF | Performed by: INTERNAL MEDICINE

## 2020-06-18 PROCEDURE — 85652 RBC SED RATE AUTOMATED: CPT | Performed by: INTERNAL MEDICINE

## 2020-06-18 PROCEDURE — 82306 VITAMIN D 25 HYDROXY: CPT | Performed by: INTERNAL MEDICINE

## 2020-06-18 PROCEDURE — 86355 B CELLS TOTAL COUNT: CPT | Performed by: INTERNAL MEDICINE

## 2020-06-18 PROCEDURE — 25000132 ZZH RX MED GY IP 250 OP 250 PS 637: Mod: ZF | Performed by: INTERNAL MEDICINE

## 2020-06-18 PROCEDURE — 84460 ALANINE AMINO (ALT) (SGPT): CPT | Performed by: INTERNAL MEDICINE

## 2020-06-18 PROCEDURE — 96375 TX/PRO/DX INJ NEW DRUG ADDON: CPT

## 2020-06-18 RX ORDER — METHYLPREDNISOLONE SODIUM SUCCINATE 125 MG/2ML
125 INJECTION, POWDER, LYOPHILIZED, FOR SOLUTION INTRAMUSCULAR; INTRAVENOUS ONCE
Status: COMPLETED | OUTPATIENT
Start: 2020-06-18 | End: 2020-06-18

## 2020-06-18 RX ORDER — METHYLPREDNISOLONE SODIUM SUCCINATE 125 MG/2ML
125 INJECTION, POWDER, LYOPHILIZED, FOR SOLUTION INTRAMUSCULAR; INTRAVENOUS ONCE
Status: CANCELLED | OUTPATIENT
Start: 2020-07-02

## 2020-06-18 RX ORDER — ACETAMINOPHEN 325 MG/1
650 TABLET ORAL ONCE
Status: COMPLETED | OUTPATIENT
Start: 2020-06-18 | End: 2020-06-18

## 2020-06-18 RX ORDER — ACETAMINOPHEN 325 MG/1
650 TABLET ORAL ONCE
Status: CANCELLED
Start: 2020-07-02

## 2020-06-18 RX ADMIN — RITUXIMAB 1000 MG: 10 INJECTION, SOLUTION INTRAVENOUS at 09:33

## 2020-06-18 RX ADMIN — METHYLPREDNISOLONE SODIUM SUCCINATE 125 MG: 125 INJECTION, POWDER, FOR SOLUTION INTRAMUSCULAR; INTRAVENOUS at 09:01

## 2020-06-18 RX ADMIN — ACETAMINOPHEN 650 MG: 325 TABLET ORAL at 09:01

## 2020-06-18 RX ADMIN — DIPHENHYDRAMINE HYDROCHLORIDE 50 MG: 50 INJECTION, SOLUTION INTRAMUSCULAR; INTRAVENOUS at 09:01

## 2020-06-18 ASSESSMENT — PAIN SCALES - GENERAL: PAINLEVEL: MODERATE PAIN (4)

## 2020-06-18 NOTE — LETTER
6/18/2020         RE: Janine Cornell  [address] [address]        Dear Colleague,    Thank you for referring your patient, Janine Cornell, to the [facility]. Please see a copy of my visit note below.    Nursing Note  Janine Cornell [facility] for:   Chief Complaint   Patient presents with     Infusion     riTUXimab (RITUXAN)     During [facility]'s [facility] appointment, orders from Dr. Mckinnon were completed.  Frequency: today is dose 1 of 2 total every 14 days.    Progress note:  Patient identification verified by name and date of birth.  Assessment completed.  Vitals recorded in Doc Flowsheets.  Patient was provided with education regarding medication/procedure and possible side effects.  Patient verbalized understanding.     present during visit today: Not Applicable.    Treatment Conditions: ~~~ NOTE: If the patient answers yes to any of the questions below, hold the infusion and contact ordering provider or on-call provider.    1. Have you recently had an elevated temperature, fever, chills, productive cough, coughing for 3 weeks or longer or hemoptysis, abnormal vital signs, night sweats,  chest pain or have you noticed a decrease in your appetite, unexplained weight loss or fatigue? No  2. Do you have any open wounds or new incisions? No  3. Do you have any recent or upcoming hospitalizations, surgeries or dental procedures? No  4. Do you currently have or recently have had any signs of illness or infection or are you on any antibiotics? No  5. Have you had any new, sudden or worsening abdominal pain? No  6. Have you or anyone in your household received a live vaccination in the past 4 weeks? Please note:  No live vaccines while on biologic/chemotherapy until 6 months after the last treatment.  Patient can receive the flu vaccine (shot only) and the  pneumovax.  It is optimal for the patient to get these vaccines mid cycle, but they can be given at any time as long as it is not on the day of the infusion. No  7. Have you recently been diagnosed with any new nervous system diseases (ie. Multiple sclerosis, Guillain [place], seizures, neurological changes) or cancer diagnosis? No  8. Are you on any form of radiation or chemotherapy? No  9. Are you pregnant or breast feeding or do you have plans of pregnancy in the future? No  10. Have you been having any signs of worsening depression or suicidal ideations?  (benlysta only) No  11. Have there been any other new onset medical symptoms? No      Premedications: administered per order.    Drug Waste Record: No    Infusion length and rate:  5.5 hours. start at 50 mg/hr, if no reaction increase rate 50 mg/hr every30 minutes to max of 200 mg/hour      Labs: were drawn per orders (Dr. Mckinnon wanted per labs drawn in open orders. Patient requested Dr. Peace labs to be drawn for upcoming appointment).    Vascular access: peripheral IV placed today.    Post Infusion Assessment:  Patient tolerated infusion without incident.     Discharge Plan:   Follow up plan of care with: ongoing infusions at [facility]. and primary care provider,  Discharge instructions were reviewed with patient.  Patient/representative verbalized understanding of discharge instructions and all questions answered.  Patient discharged from [facility] in stable condition.    Jane Robb RN    Administrations This Visit     acetaminophen (TYLENOL) tablet 650 mg     Admin Date  06/18/2020 Action  Given Dose  650 mg Route  Oral Administered By  Jane Robb RN          diphenhydrAMINE (BENADRYL) 50 mg in sodium chloride 0.9 % 51 mL intermittent infusion     Admin Date  06/18/2020 Action  New Bag Dose  50 mg Rate  204 mL/hr Route  Intravenous Administered By  Jane Robb RN           methylPREDNISolone sodium succinate (solu-MEDROL) injection 125 mg     Admin Date  06/18/2020 Action  Given Dose  125 mg Route  Intravenous Administered By  Jane Robb RN          riTUXimab (RITUXAN) 1,000 mg in sodium chloride 0.9 % 1,000 mL NON-oncology use     Admin Date  06/18/2020 Action  New Bag Dose  1000 mg Route  Intravenous Administered By  Jane Robb RN                BP (P) 95/61   Pulse (P) 85   Temp (P) 98.8  F (37.1  C) (Oral)   Resp (P) 18   Wt 75.1 kg (165 lb 8 oz)   SpO2 (P) 99%   BMI 29.32 kg/m          Again, thank you for allowing me to participate in the care of your patient.        Sincerely,        [facility] [place]

## 2020-06-18 NOTE — LETTER
June 29, 2020      Janine Cornell  [address] [address]        Dear ,    We are writing to inform you of your test results.    They are stable.    Resulted Orders   Vitamin D Deficiency   Result Value Ref Range    Vitamin D Deficiency screening 34 20 - 75 ug/L      Comment:      Season, race, dietary intake, and treatment affect the concentration of   25-hydroxy-Vitamin D. Values may decrease during winter months and increase   during summer months. Values 20-29 ug/L may indicate Vitamin D insufficiency   and values <20 ug/L may indicate Vitamin D deficiency.  Vitamin D determination is routinely performed by an immunoassay specific for   25 hydroxyvitamin D3.  If an individual is on vitamin D2 (ergocalciferol)   supplementation, please specify 25 OH vitamin D2 and D3 level determination by   LCMSMS test [id number].     Immunoglobulins A G and M   Result Value Ref Range     610 - 1,616 mg/dL     84 - 499 mg/dL    IGM 41 35 - 242 mg/dL   ANCA IgG by IFA with Reflex to Titer   Result Value Ref Range    Neutrophil Cytoplasmic Antibody <1:10 <1:10 [titer]    Neutrophil Cytoplasmic Antibody Pattern       The ANCA IFA is <1:10.  No further testing will be performed.   CD19 B Cell Count   Result Value Ref Range    CD19 B Cells <1 6 - 27 %    Absolute CD19 <1 107 - 698 cells/uL   Erythrocyte sedimentation rate auto   Result Value Ref Range    Sed Rate 18 0 - 30 mm/h   CRP inflammation   Result Value Ref Range    CRP Inflammation 4.8 0.0 - 8.0 mg/L   Creatinine   Result Value Ref Range    Creatinine 1.83 (H) 0.52 - 1.04 mg/dL    GFR Estimate 31 (L) >60 mL/min/[1.73_m2]      Comment:      Non  GFR Calc  Starting 12/18/2018, serum creatinine based estimated GFR (eGFR) will be   calculated using the Chronic Kidney Disease Epidemiology Collaboration   (CKD-EPI) equation.      GFR Estimate If Black 36 (L) >60 mL/min/[1.73_m2]      Comment:       GFR  Calc  Starting 12/18/2018, serum creatinine based estimated GFR (eGFR) will be   calculated using the Chronic Kidney Disease Epidemiology Collaboration   (CKD-EPI) equation.     CBC with platelets differential   Result Value Ref Range    WBC 11.1 (H) 4.0 - 11.0 10e9/L    RBC Count 4.42 3.8 - 5.2 10e12/L    Hemoglobin 11.7 11.7 - 15.7 g/dL    Hematocrit 35.9 35.0 - 47.0 %    MCV 81 78 - 100 fl    MCH 26.5 26.5 - 33.0 pg    MCHC 32.6 31.5 - 36.5 g/dL    RDW 13.2 10.0 - 15.0 %    Platelet Count 348 150 - 450 10e9/L    Diff Method Automated Method     % Neutrophils 94.1 %    % Lymphocytes 4.5 %    % Monocytes 0.7 %    % Eosinophils 0.1 %    % Basophils 0.3 %    % Immature Granulocytes 0.3 %    Nucleated RBCs 0 0 /100    Absolute Neutrophil 10.5 (H) 1.6 - 8.3 10e9/L    Absolute Lymphocytes 0.5 (L) 0.8 - 5.3 10e9/L    Absolute Monocytes 0.1 0.0 - 1.3 10e9/L    Absolute Eosinophils 0.0 0.0 - 0.7 10e9/L    Absolute Basophils 0.0 0.0 - 0.2 10e9/L    Abs Immature Granulocytes 0.0 0 - 0.4 10e9/L    Absolute Nucleated RBC 0.0    Albumin level   Result Value Ref Range    Albumin 3.6 3.4 - 5.0 g/dL   Myeloperoxidase and Proteinase 3 Panel   Result Value Ref Range    Myeloperoxidase Antibody IgG 1.2 (H) 0.0 - 0.9 AI      Comment:      Positive  Antibody index (AI) values reflect qualitative changes in antibody   concentration that cannot be directly associated with clinical condition or   disease state.      Proteinase 3 Antibody IgG <0.2 0.0 - 0.9 AI      Comment:      Negative  Antibody index (AI) values reflect qualitative changes in antibody   concentration that cannot be directly associated with clinical condition or   disease state.     AST   Result Value Ref Range    AST 23 0 - 45 U/L   ALT   Result Value Ref Range    ALT 30 0 - 50 U/L   Protein  random urine with Creat Ratio   Result Value Ref Range    Protein Random Urine 0.73 g/L    Protein Total Urine g/gr Creatinine 0.19 0 - 0.2 g/g Cr   UA with Microscopic reflex to  Culture   Result Value Ref Range    Color Urine Gwen     Appearance Urine Slightly Cloudy     Glucose Urine Negative NEG^Negative mg/dL    Bilirubin Urine Negative NEG^Negative    Ketones Urine 5 (A) NEG^Negative mg/dL    Specific Gravity Urine 1.030 1.003 - 1.035    Blood Urine Negative NEG^Negative    pH Urine 5.0 5.0 - 7.0 pH    Protein Albumin Urine 30 (A) NEG^Negative mg/dL    Urobilinogen mg/dL 0.0 0.0 - 2.0 mg/dL    Nitrite Urine Negative NEG^Negative    Leukocyte Esterase Urine Negative NEG^Negative    Source Midstream Urine     WBC Urine 3 0 - 5 /HPF    RBC Urine 3 (H) 0 - 2 /HPF    Squamous Epithelial /HPF Urine 1 0 - 1 /HPF    Mucous Urine Present (A) NEG^Negative /LPF    Hyaline Casts 38 (H) 0 - 2 /LPF   Creatinine urine calculation only   Result Value Ref Range    Creatinine Urine 385 mg/dL       If you have any questions or concerns, please call the clinic at the number listed above.       Sincerely,        Majo Mckinnon MD

## 2020-06-18 NOTE — PROGRESS NOTES
Nursing Note  Janine Cornell [facility] for:   Chief Complaint   Patient presents with     Infusion     riTUXimab (RITUXAN)     During [facility]'s [facility] appointment, orders from Dr. Mckinnon were completed.  Frequency: today is dose 1 of 2 total every 14 days.    Progress note:  Patient identification verified by name and date of birth.  Assessment completed.  Vitals recorded in Doc Flowsheets.  Patient was provided with education regarding medication/procedure and possible side effects.  Patient verbalized understanding.     present during visit today: Not Applicable.    Treatment Conditions: ~~~ NOTE: If the patient answers yes to any of the questions below, hold the infusion and contact ordering provider or on-call provider.    1. Have you recently had an elevated temperature, fever, chills, productive cough, coughing for 3 weeks or longer or hemoptysis, abnormal vital signs, night sweats,  chest pain or have you noticed a decrease in your appetite, unexplained weight loss or fatigue? No  2. Do you have any open wounds or new incisions? No  3. Do you have any recent or upcoming hospitalizations, surgeries or dental procedures? No  4. Do you currently have or recently have had any signs of illness or infection or are you on any antibiotics? No  5. Have you had any new, sudden or worsening abdominal pain? No  6. Have you or anyone in your household received a live vaccination in the past 4 weeks? Please note:  No live vaccines while on biologic/chemotherapy until 6 months after the last treatment.  Patient can receive the flu vaccine (shot only) and the pneumovax.  It is optimal for the patient to get these vaccines mid cycle, but they can be given at any time as long as it is not on the day of the infusion. No  7. Have you recently been diagnosed with any new nervous system diseases (ie. Multiple sclerosis, Guillain [place],  seizures, neurological changes) or cancer diagnosis? No  8. Are you on any form of radiation or chemotherapy? No  9. Are you pregnant or breast feeding or do you have plans of pregnancy in the future? No  10. Have you been having any signs of worsening depression or suicidal ideations?  (benlysta only) No  11. Have there been any other new onset medical symptoms? No      Premedications: administered per order.    Drug Waste Record: No    Infusion length and rate:  5.5 hours. start at 50 mg/hr, if no reaction increase rate 50 mg/hr every30 minutes to max of 200 mg/hour      Labs: were drawn per orders (Dr. Mckinnon wanted per labs drawn in open orders. Patient requested Dr. Peace labs to be drawn for upcoming appointment).    Vascular access: peripheral IV placed today.    Post Infusion Assessment:  Patient tolerated infusion without incident.     Discharge Plan:   Follow up plan of care with: ongoing infusions [facility]. and primary care provider,  Discharge instructions were reviewed with patient.  Patient/representative verbalized understanding of discharge instructions and all questions answered.  Patient discharged [facility] in stable condition.    Jane Robb RN    Administrations This Visit     acetaminophen (TYLENOL) tablet 650 mg     Admin Date  06/18/2020 Action  Given Dose  650 mg Route  Oral Administered By  Jane Robb RN          diphenhydrAMINE (BENADRYL) 50 mg in sodium chloride 0.9 % 51 mL intermittent infusion     Admin Date  06/18/2020 Action  New Bag Dose  50 mg Rate  204 mL/hr Route  Intravenous Administered By  Jane Robb RN          methylPREDNISolone sodium succinate (solu-MEDROL) injection 125 mg     Admin Date  06/18/2020 Action  Given Dose  125 mg Route  Intravenous Administered By  Jane Robb RN          riTUXimab (RITUXAN) 1,000 mg in sodium chloride 0.9 % 1,000 mL NON-oncology use      Admin Date  06/18/2020 Action  New Bag Dose  1000 mg Route  Intravenous Administered By  Jane Robb RN                BP (P) 95/61   Pulse (P) 85   Temp (P) 98.8  F (37.1  C) (Oral)   Resp (P) 18   Wt 75.1 kg (165 lb 8 oz)   SpO2 (P) 99%   BMI 29.32 kg/m

## 2020-06-18 NOTE — PATIENT INSTRUCTIONS
Patient Education     Rituximab Solution for injection  What is this medicine?  RITUXIMAB (ri TUX i mab) is a monoclonal antibody. This medicine changes the way the body's immune system works. It is used commonly to treat non-Hodgkin lymphoma and other conditions. In cancer cells, this drug targets a specific protein within cancer cells and stops the cancer cells from growing. It is also used to treat rheumatoid arthritis (RA). In RA, this medicine slows the inflammatory process and help reduce joint pain and swelling. This medicine is often used with other cancer or arthritis medications.  This medicine may be used for other purposes; ask your health care provider or pharmacist if you have questions.  What should I tell my health care provider before I take this medicine?  They need to know if you have any of these conditions:    blood disorders    heart disease    history of hepatitis B    infection (especially a virus infection such as chickenpox, cold sores, or herpes)    irregular heartbeat    kidney disease    lung or breathing disease, like asthma    lupus    an unusual or allergic reaction to rituximab, mouse proteins, other medicines, foods, dyes, or preservatives    pregnant or trying to get pregnant    breast-feeding  How should I use this medicine?  This medicine is for infusion into a vein. It is administered in a hospital or clinic by a specially trained health care professional.  A special MedGuide will be given to you by the pharmacist with each prescription and refill. Be sure to read this information carefully each time.  Talk to your pediatrician regarding the use of this medicine in children. This medicine is not approved for use in children.  Overdosage: If you think you have taken too much of this medicine contact a poison control center or emergency room at once.  NOTE: This medicine is only for you. Do not share this medicine with others.  What if I miss a dose?  It is important not to miss a  dose. Call your doctor or health care professional if you are unable to keep an appointment.  What may interact with this medicine?    cisplatin    medicines for blood pressure    some other medicines for arthritis    vaccines  This list may not describe all possible interactions. Give your health care provider a list of all the medicines, herbs, non-prescription drugs, or dietary supplements you use. Also tell them if you smoke, drink alcohol, or use illegal drugs. Some items may interact with your medicine.  What should I watch for while using this medicine?  Report any side effects that you notice during your treatment right away, such as changes in your breathing, fever, chills, dizziness or lightheadedness. These effects are more common with the first dose.  Visit your prescriber or health care professional for checks on your progress. You will need to have regular blood work. Report any other side effects. The side effects of this medicine can continue after you finish your treatment. Continue your course of treatment even though you feel ill unless your doctor tells you to stop.  Call your doctor or health care professional for advice if you get a fever, chills or sore throat, or other symptoms of a cold or flu. Do not treat yourself. This drug decreases your body's ability to fight infections. Try to avoid being around people who are sick.  This medicine may increase your risk to bruise or bleed. Call your doctor or health care professional if you notice any unusual bleeding.  Be careful brushing and flossing your teeth or using a toothpick because you may get an infection or bleed more easily. If you have any dental work done, tell your dentist you are receiving this medicine.  Avoid taking products that contain aspirin, acetaminophen, ibuprofen, naproxen, or ketoprofen unless instructed by your doctor. These medicines may hide a fever.  Do not become pregnant while taking this medicine. Women should inform  their doctor if they wish to become pregnant or think they might be pregnant. There is a potential for serious side effects to an unborn child. Talk to your health care professional or pharmacist for more information. Do not breast-feed an infant while taking this medicine.  What side effects may I notice from receiving this medicine?  Side effects that you should report to your doctor or health care professional as soon as possible:    allergic reactions like skin rash, itching or hives, swelling of the face, lips, or tongue    low blood counts - this medicine may decrease the number of white blood cells, red blood cells and platelets. You may be at increased risk for infections and bleeding.    signs of infection - fever or chills, cough, sore throat, pain or difficulty passing urine    signs of decreased platelets or bleeding - bruising, pinpoint red spots on the skin, black, tarry stools, blood in the urine    signs of decreased red blood cells - unusually weak or tired, fainting spells, lightheadedness    breathing problems    confused, not responsive    chest pain    fast, irregular heartbeat    feeling faint or lightheaded, falls    mouth sores    redness, blistering, peeling or loosening of the skin, including inside the mouth    stomach pain    swelling of the ankles, feet, or hands    trouble passing urine or change in the amount of urine  Side effects that usually do not require medical attention (report to your doctor or other health care professional if they continue or are bothersome):    anxiety    headache    loss of appetite    muscle aches    nausea    night sweats  This list may not describe all possible side effects. Call your doctor for medical advice about side effects. You may report side effects to FDA at 1-219-PUU-7863.  Where should I keep my medicine?  This drug is given in a hospital or clinic and will not be stored at home.  NOTE:This sheet is a summary. It may not cover all possible  information. If you have questions about this medicine, talk to your doctor, pharmacist, or health care provider. Copyright  2016 [employer]

## 2020-06-19 LAB
CD19 CELLS # BLD: <1 CELLS/UL (ref 107–698)
CD19 CELLS NFR BLD: <1 % (ref 6–27)
DEPRECATED CALCIDIOL+CALCIFEROL SERPL-MC: 34 UG/L (ref 20–75)
IGA SERPL-MCNC: 224 MG/DL (ref 84–499)
IGG SERPL-MCNC: 689 MG/DL (ref 610–1616)
IGM SERPL-MCNC: 41 MG/DL (ref 35–242)
MYELOPEROXIDASE AB SER-ACNC: 1.2 AI (ref 0–0.9)
PROTEINASE3 IGG SER-ACNC: <0.2 AI (ref 0–0.9)

## 2020-06-21 LAB
ANCA AB PATTERN SER IF-IMP: NORMAL
C-ANCA TITR SER IF: NORMAL {TITER}

## 2020-06-22 ENCOUNTER — VIRTUAL VISIT (OUTPATIENT)
Dept: CARDIOLOGY | Facility: CLINIC | Age: 55
End: 2020-06-22
Attending: INTERNAL MEDICINE
Payer: COMMERCIAL

## 2020-06-22 DIAGNOSIS — I10 HYPERTENSION GOAL BP (BLOOD PRESSURE) < 140/90: ICD-10-CM

## 2020-06-22 PROCEDURE — 99214 OFFICE O/P EST MOD 30 MIN: CPT | Mod: 95 | Performed by: INTERNAL MEDICINE

## 2020-06-22 RX ORDER — SPIRONOLACTONE 25 MG/1
TABLET ORAL
Qty: 30 TABLET | Refills: 5 | Status: SHIPPED | OUTPATIENT
Start: 2020-06-22 | End: 2020-07-06

## 2020-06-22 NOTE — PROGRESS NOTES
"  Janine Cornell is a 55 year old female who is being evaluated via a billable telephone visit.       The patient has been notified of following:      \"This telephone visit will be conducted via a call between you and your physician/provider. We have found that certain health care needs can be provided without the need for a physical exam.  This service lets us provide the care you need with a short phone conversation.  If a prescription is necessary we can send it directly to your pharmacy.  If lab work is needed we can place an order for that and you can then stop by our lab to have the test done at a later time.     Telephone visits are billed at different rates depending on your insurance coverage. During this emergency period, for some insurers they may be billed the same as an in-person visit.  Please reach out to your insurance provider with any questions.     If during the course of the call the physician/provider feels a telephone visit is not appropriate, you will not be charged for this service.\"     Patient has given verbal consent for Telephone visit?  Yes     What phone number would you like to be contacted at? [phone]     How would you like to obtain your AVS? Mail a copy    HPI:      Ms Janine Cornell, who is a 55 yr -American patient with DM2, Hypothyroidism, PCOS, Dyslipidemia, HTN, and CAD S/P NSTEMI s/p KATHERINE to the mLAD in 2011 and RCA in 2016 comes for follow-up.      Patient has a positive RA factor and fibromyalgia (see recent visit rheumatology).       Patient reports that she has chest discomfort, but this is relatively stable.  She feels relatively tired - however her complaints have been relatively stable. Her BP is lower as before resulting in less energy.  She denies palpitations, intermittent claudication. She has many chronic symptoms that appear to be stable at this time.     PAST MEDICAL HISTORY:  Past Medical History[]Expand by Default        Past Medical History: "   Diagnosis Date     Abnormal glandular Papanicolaou smear of cervix 1992     Allergic rhinitis       Allergy, airborne subst     Arthritis       ASCVD (arteriosclerotic cardiovascular disease)       Chronic pain       Chronic pancreatitis (H)       idiopathic, Tx: PPI, antioxidants, pancreatic enzymes     Common migraine       Coronary artery disease       Costochondritis       Difficulty in walking(719.7)       Dyspnea on exertion       Ectasia, mammary duct       [facility], [facility] n[facility] discharge     Elevated fasting glucose       Gastro-oesophageal reflux disease       Hernia       History of angina       Hyperlipidemia LDL goal < 100       Hypertension goal BP (blood pressure) < 140/90       Essential hypertension     Iron deficiency anemia       Ischemic cardiomyopathy       Menorrhagia       Migraine headaches       Mild persistent asthma       Neuritis optic 1997     subacute autoimmune retrobulbar neuritis, Dr. 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 Matias, neg w/u, Rx'd w/prednisone     Ventral hernia, unspecified, without mention of obstruction or gangrene 7/2012           CURRENT MEDICATIONS:  Current Outpatient Prescriptions[]Expand by Default          Current Outpatient Medications   Medication Sig Dispense Refill     acetaminophen (TYLENOL) 325 MG tablet Take 1-2 tablets (325-650 mg) by  mouth every 6 hours as needed for pain (headache) 250 tablet 0     albuterol (2.5 MG/3ML) 0.083% neb solution INL 1 VIAL VIA NEBULIZATION Q 4 TO 6 HOURS PRN   1     albuterol (PROAIR HFA, PROVENTIL HFA, VENTOLIN HFA) 108 (90 BASE) MCG/ACT inhaler Inhale 2 puffs into the lungs every 6 hours as needed for shortness of breath / dyspnea or wheezing 3 Inhaler 1     aspirin (ASPIRIN LOW DOSE) 81 MG EC tablet Take 1 tablet (81 mg) by mouth daily 30 tablet 11     blood glucose monitoring (NO BRAND SPECIFIED) meter device kit Use to test blood sugar 4 X times daily or as directed. (Patient taking differently: 1 kit Use to test blood sugar 4 X times daily or as directed.) 1 kit 0     blood glucose monitoring (NO BRAND SPECIFIED) test strip 1 strip by In Vitro route 4 times daily Test as directed. Please dispense three months and three months of refills. Thank you. (Patient taking differently: 1 strip by In Vitro route 4 times daily Test as directed. Please dispense three months and three months of refills. Thank you.) 360 each 3     Blood Pressure Monitor KIT 1 each daily Monitor home blood pressure as instructed by physician.  Dispense [place] blood pressure kit. 1 kit 0     calcium carbonate (TUMS) 500 MG chewable tablet Take 3-4 chew tab by mouth daily as needed.         clopidogrel (PLAVIX) 75 MG tablet Take 1 tablet (75 mg) by mouth daily 30 tablet 11     COMPRESSION STOCKINGS 2 each daily Apply one 10-15 mmHg compression stocking to each lower extgmierty during the day and remove before bedtime. 4 each 2     cyclobenzaprine (FLEXERIL) 10 MG tablet Take 1 tablet (10 mg) by mouth 2 times daily as needed for muscle spasms 60 tablet 2     cycloSPORINE (RESTASIS) 0.05 % ophthalmic emulsion Place 1 drop into both eyes 2 times daily 60 each 11     cycloSPORINE (RESTASIS) 0.05 % ophthalmic emulsion Place 1 drop into the right eye every 12 hours         desonide (DESOWEN) 0.05 % cream Apply topically 2 times daily          diclofenac (VOLTAREN) 1 % topical gel Apply 2 g topically 4 times daily Apply to affected joints 100 g 3     dicyclomine (BENTYL) 20 MG tablet TAKE 1 TABLET(20 MG) BY MOUTH FOUR TIMES DAILY AS NEEDED. Pls schedule clinic appt for refills. 60 tablet 0     diphenhydrAMINE (BENADRYL) 25 MG capsule TK 1 TO 2 CS PO QHS   4     EPIPEN 2-RIKY 0.3 MG/0.3ML injection INJECT 0.3 MG INTO THE MUSCLE PRF ANAPHYALAXIS   0     fexofenadine (ALLEGRA) 180 MG tablet Take 1 tablet by mouth daily as needed. 90 tablet 3     fluocinolone (SYNALAR) 0.01 % solution Apply topically daily as needed         fluticasone-vilanterol (BREO ELLIPTA) 100-25 MCG/INH inhaler Inhale 1 puff into the lungs daily         hydroxychloroquine (PLAQUENIL) 200 MG tablet Take 2 tablets (400 mg) by mouth daily 180 tablet 1     insulin pen needle (BD MARCK U/F) 32G X 4 MM USE DAILY OR AS DIRECTED 300 each 3     ketoconazole (NIZORAL) 2 % shampoo Apply topically daily as needed         Ketoprofen POWD 1 g 2 times daily as needed (prn pain) 500 g 3     lidocaine (LMX4) 4 % external cream Apply topically once as needed for mild pain (prn pain) 30 g 3     lifitegrast (XIIDRA) 5 % opthalmic solution Place 1 drop into both eyes 2 times daily 20 each 3     lisinopril-hydrochlorothiazide (PRINZIDE/ZESTORETIC) 20-25 MG tablet Take 1 tablet by mouth daily 30 tablet 11     metFORMIN (GLUCOPHAGE-XR) 500 MG 24 hr tablet TAKE 2 TABLETS(1000 MG) BY MOUTH DAILY WITH DINNER (Patient taking differently: Take 2,000 mg by mouth daily ) 180 tablet 0     metoclopramide (REGLAN) 10 MG tablet Take 1 tablet (10 mg) by mouth 4 times daily (before meals and nightly) 90 tablet 0     metoprolol succinate ER (TOPROL-XL) 100 MG 24 hr tablet Take 1 tablet (100 mg) by mouth daily 30 tablet 11     metroNIDAZOLE (NORITATE) 1 % cream Apply topically daily         montelukast (SINGULAIR) 10 MG tablet Take 1 tablet (10 mg) by mouth At Bedtime 30 tablet 1     Multiple Vitamin (DAILY-GERALD) TABS Take 1  tablet by mouth daily   0     nitroGLYcerin (NITROSTAT) 0.4 MG sublingual tablet Place 1 tablet (0.4 mg) under the tongue every 5 minutes as needed for chest pain 25 tablet 1     nystatin (MYCOSTATIN) 703456 UNITS TABS tablet TK 2 TS PO BID   0     ondansetron (ZOFRAN-ODT) 8 MG ODT tab Take 1 tablet (8 mg) by mouth every 8 hours as needed for nausea 60 tablet 1     pantoprazole (PROTONIX) 40 MG EC tablet Take 1 tablet (40 mg) by mouth daily Pork free tablets. (Patient taking differently: Take 40 mg by mouth as needed Pork free tablets.) 30 tablet 1     pravastatin (PRAVACHOL) 40 MG tablet Take 1 tablet (40 mg) by mouth daily 30 tablet 5     spironolactone (ALDACTONE) 50 MG tablet TAKE 1 TABLET BY MOUTH EVERY DAY TAKE ADDITIONAL 1/2 TABLET BY MOUTH EVERY DAY AS NEEDED FOR WEIGHT GAIN 30 tablet 6     sucralfate (CARAFATE) 1 GM tablet Take 1 tablet (1 g) by mouth 4 times daily 360 tablet 0     traMADol (ULTRAM) 50 MG tablet Take 1 tablet (50 mg) by mouth every 8 hours as needed for moderate pain 60 tablet 0     Triamcinolone Acetonide (NASACORT ALLERGY 24HR NA)           vitamin D2 (ERGOCALCIFEROL) 19334 units (1250 mcg) capsule Take 1 capsule (50,000 Units) by mouth every 7 days 12 capsule 0     ferrous gluconate (FERGON) 324 (38 Fe) MG tablet Take 1 tablet (324 mg) by mouth 2 times daily (with meals) (Patient not taking: Reported on 10/29/2019) 180 tablet 3     folic acid (FOLVITE) 1 MG tablet Take 1 tablet by mouth daily (Patient not taking: Reported on 10/29/2019) 90 tablet 3     Magnesium Oxide -Mg Supplement 250 MG TABS TK 1 T PO BID. REDUCE IF STOOLS LOOSEN   11     ranitidine (ZANTAC) 150 MG tablet Take 1 tablet (150 mg) by mouth 2 times daily (Patient not taking: Reported on 10/29/2019) 180 tablet 1           PAST SURGICAL HISTORY:  Past Surgical History[]Expand by Default         Past Surgical History:   Procedure Laterality Date     C ECHO HEART XTHORACIC,COMPLETE, W/O DOPPLER   2/4/04     [facility]  WNL, EF 60%     C/SECTION, LOW TRANSVERSE             CARDIAC SURGERY         cardiac stent- recent in 16; 4 other stents     DILATION AND CURETTAGE N/A 2016     Procedure: DILATION AND CURETTAGE;  Surgeon: Nahed Butler MD;  Location: [facility]     HC UGI ENDOSCOPY W EUS   08     Dr. Pastrana, possible chronic pancreatitis, fatty liver     HERNIA REPAIR   2012     done [place] [facility] [place]     INSERT INTRAUTERINE DEVICE N/A 2016     Procedure: INSERT INTRAUTERINE DEVICE;  Surgeon: Nahed Butler MD;  Location: [facility]     INT UTERINE FIBRIOD EMBOLIZATION   10/29/2014     LAPAROSCOPIC CHOLECYSTECTOMY   08     Dr. Arnol GRUBBS TX, CERVICAL         s/p LEEP     ORBITOTOMY Right 3/15/2016     Procedure: ORBITOTOMY;  Surgeon: Myron Cyr MD;  Location: [facility] [place]     ORBITOTOMY Right 2017     Procedure: ORBITOTOMY;  RIGHT ORBITOTOMY AND BIOPSY;  Surgeon: Charis Holbrook MD;  Location: [facility] [place]     REPAIR PTOSIS Right 2017     Procedure: REPAIR PTOSIS;  RIGHT UPPER LID PTOSIS REPAIR;  Surgeon: Myron Cyr MD;  Location: [facility]     UPPER GI ENDOSCOPY   10/21/08     mild gastritis, Dr. Hidalgo           ALLERGIES           Allergies   Allergen Reactions     Amoxicillin-Pot Clavulanate       Augmentin         Unknown possible hives and edema     Azithromycin       Diatrizoate Other (See Comments)       Pt wants this listed because she is allergic to shellfish      Imitrex [Sumatriptan]         Severe face/neck/chest tightness and flushing side effects      Penicillins Hives       Unknown      Pork Allergy         Stomach pain, cramping, diarrhea, itching, nausea and headaches     Shellfish Allergy Hives and Swelling     Sulfa Drugs Hives and Swelling     Zithromax [Azithromycin Dihydrate] Swelling       Unknown         FAMILY HISTORY:  Family History[]Expand by Default   Family History   Problem Relation Age of Onset     Heart Disease Father 50         heart attack      Cerebrovascular Disease Father       Diabetes Father       Hypertension Father       Depression Father       C.A.D. Father       Hypertension Mother       Arthritis Mother       Heart Disease Mother           long qt syndrome     Thyroid Disease Mother       C.A.D. Mother       Heart Disease Brother 15         MI at 15, 16.      Diabetes Maternal Uncle       Hypertension Maternal Aunt       Hypertension Maternal Uncle       Arthritis Brother           he passed away, had severe arthritis at age 11     Arthritis Maternal Uncle       Eye Disorder Maternal Uncle           cataracts     Neurologic Disorder Sister           migraines     Neurologic Disorder Sister           migraines     Respiratory Son           asthma     Cerebrovascular Disease Maternal Uncle       C.A.D. Brother       Family History Negative Other           neg for RA, SLE     Unknown/Adopted No family hx of           unknown neurological issues in her family, mother was adopted     Skin Cancer No family hx of           No known family hx of skin cancer           SOCIAL HISTORY:  Social History            Socioeconomic History     Marital status: Single       Spouse name: None     Number of children: 1     Years of education: None     Highest education level: None   Occupational History       Employer: NONE    Social Needs     Financial resource strain: None     Food insecurity:       Worry: None       Inability: None     Transportation needs:       Medical: None       Non-medical: None   Tobacco Use     Smoking status: Current Every Day Smoker       Packs/day: 0.20       Years: 1.00       Pack years: 0.20       Types: Cigarettes       Last attempt to quit: 2016       Years since quitting: 3.4     Smokeless tobacco: Never Used   Substance and Sexual Activity     Alcohol use: No       Alcohol/week: 0.0 oz     Drug use: No     Sexual activity: Not Currently   Lifestyle     Physical activity:       Days per week: None       Minutes per  session: None     Stress: None   Relationships     Social connections:       Talks on phone: None       Gets together: None       Attends Gnosticist service: None       Active member of club or organization: None       Attends meetings of clubs or organizations: None       Relationship status: None     Intimate partner violence:       Fear of current or ex partner: None       Emotionally abused: None       Physically abused: None       Forced sexual activity: None   Other Topics Concern     Parent/sibling w/ CABG, MI or angioplasty before 65F 55M? Yes   Social History Narrative     Balanced Diet - sometimes     Osteoporosis Prevention Measures - Dairy servings per day: 2 servings weekly     Regular Exercise -  Yes Describe walking 4 times a week     Dental Exam - NO     Seatbelts used - Yes     Self Breast Exam - Yes     Abuse: Current or Past (Physical, Sexual or Emotional)- No     Do you have any concerns about STD's -  No     Do you feel safe in your environment - No     Guns stored in the home - No     Sunscreen used - Yes     Lipids -  YES - Date: 053102     Glucose -  YES - Date: 012804     Eye Exam - YES - Date: one year ago     Colon Cancer Screening - No     Hemoccults - NO     Pap Test -  YES - Date: 070904, remote history of LEEP     Mammography - YES - Date: last spring, would like to discuss, needs a referral to [facility]     DEXA - NO     Immunizations reviewed and up to date - Yes, last td given in 1997 or 1998         ROS:   Constitutional: No fever, chills, or sweats. Intermittent weight gain/loss   ENT: No visual disturbance, ear ache, epistaxis, sore throat  Allergies/Immunologic: Negative.   Respiratory: No cough, hemoptysia  Cardiovascular: As per HPI  GI: No nausea, vomiting, hematemesis, melena, or hematochezia  : No urinary frequency, dysuria, or hematuria  Integument: Negative  Psychiatric: Negative  Neuro: Negative  Endocrinology: Negative   Musculoskeletal:  Negative      Labs   Ref. Range 6/18/2020 15:00   Sodium Latest Ref Range: 133 - 144 mmol/L 136   Potassium Latest Ref Range: 3.4 - 5.3 mmol/L 5.4 (H)   Chloride Latest Ref Range: 94 - 109 mmol/L 107   Carbon Dioxide Latest Ref Range: 20 - 32 mmol/L 25   Urea Nitrogen Latest Ref Range: 7 - 30 mg/dL 32 (H)   Creatinine Latest Ref Range: 0.52 - 1.04 mg/dL 1.80 (H)   GFR Estimate Latest Ref Range: >60 mL/min/1.73_m2 31 (L)   GFR Estimate If Black Latest Ref Range: >60 mL/min/1.73_m2 36 (L)   Calcium Latest Ref Range: 8.5 - 10.1 mg/dL 8.6   Anion Gap Latest Ref Range: 3 - 14 mmol/L 5   Albumin Latest Ref Range: 3.4 - 5.0 g/dL 3.6   Protein Total Latest Ref Range: 6.8 - 8.8 g/dL 7.1   Bilirubin Total Latest Ref Range: 0.2 - 1.3 mg/dL 0.3   Alkaline Phosphatase Latest Ref Range: 40 - 150 U/L 72   ALT Latest Ref Range: 0 - 50 U/L 29   AST Latest Ref Range: 0 - 45 U/L 26   Hemoglobin A1C Latest Ref Range: 0 - 5.6 % 8.0 (H)   Cholesterol Latest Ref Range: <200 mg/dL 102   HDL Cholesterol Latest Ref Range: >49 mg/dL 30 (L)   LDL Cholesterol Calculated Latest Ref Range: <100 mg/dL 50   Non HDL Cholesterol Latest Ref Range: <130 mg/dL 71   Triglycerides Latest Ref Range: <150 mg/dL 104   Glucose Latest Ref Range: 70 - 99 mg/dL 250 (H)     Assessment and Plan     We discussed the results with patient.  We discussed the importance of a heart healthy diet and  Lifestyle.  We discussed the importance of a diabetes diet.    Medication Changes:   - Reduce spironolactone to 0.5 tablet (25 mg) every day.  - If blood pressure is below 120 mmHg, decrease lisinopril-hydrochlorothiazide to 0.5 tablet (10 mg -12.5 mg).  Contact clinic if this occurs.  - Stop Aspirin for a few days to see if your GI upset improves.  Contact clinic to update on status of symptoms after this time.  - Continue Plavix     Recommendations:   - Complete non-fasting metabolic panel in one week.  - Continue to monitor your home blood pressures and pulse daily.        Studies Ordered: Echocardiogram in about one week.     The results from today include: Labs.    Follow-up labs   Ref. Range 7/2/2020 08:28   Sodium Latest Ref Range: 133 - 144 mmol/L 138   Potassium Latest Ref Range: 3.4 - 5.3 mmol/L 3.3 (L)   Chloride Latest Ref Range: 94 - 109 mmol/L 105   Carbon Dioxide Latest Ref Range: 20 - 32 mmol/L 25   Urea Nitrogen Latest Ref Range: 7 - 30 mg/dL 35 (H)   Creatinine Latest Ref Range: 0.52 - 1.04 mg/dL 1.47 (H)   GFR Estimate Latest Ref Range: >60 mL/min/1.73_m2 40 (L)   GFR Estimate If Black Latest Ref Range: >60 mL/min/1.73_m2 46 (L)   Calcium Latest Ref Range: 8.5 - 10.1 mg/dL 9.5   Anion Gap Latest Ref Range: 3 - 14 mmol/L 8   Albumin Latest Ref Range: 3.4 - 5.0 g/dL 4.4   Protein Total Latest Ref Range: 6.8 - 8.8 g/dL 8.0   Bilirubin Total Latest Ref Range: 0.2 - 1.3 mg/dL 0.4   Alkaline Phosphatase Latest Ref Range: 40 - 150 U/L 86   ALT Latest Ref Range: 0 - 50 U/L 34   AST Latest Ref Range: 0 - 45 U/L 23   Glucose Latest Ref Range: 70 - 99 mg/dL 150 (H)            Please follow up: With Dr. Peace based on results of testing.      30 min were spent directly with patient.    Milan Peace MD, PhD  Professor of Medicine  Division of Cardiology

## 2020-06-22 NOTE — LETTER
6/22/2020      RE: Janine Cornell  [address] [facility] [id number]       Dear Colleague,    Thank you for the opportunity to participate in the care of your patient, Janine Cornell, at the [facility] at [facility]. Please see a copy of my visit note below.      Janine Cornell is a 55 year old female who is being evaluated via a billable telephone visit.      HPI:      Ms Janine Cornell, who is a 55 yr -American patient with DM2, Hypothyroidism, PCOS, Dyslipidemia, HTN, and CAD S/P NSTEMI s/p KATHERINE to the mLAD in 2011 and RCA in 2016 comes for follow-up.      Patient has a positive RA factor and fibromyalgia (see recent visit rheumatology).       Patient reports that she has chest discomfort, but this is relatively stable.  She feels relatively tired - however her complaints have been relatively stable. Her BP is lower as before resulting in less energy.  She denies palpitations, intermittent claudication. She has many chronic symptoms that appear to be stable at this time.     PAST MEDICAL HISTORY:  Past Medical History[]Expand by Default        Past Medical History:   Diagnosis Date     Abnormal glandular Papanicolaou smear of cervix 1992     Allergic rhinitis       Allergy, airborne subst     Arthritis       ASCVD (arteriosclerotic cardiovascular disease)       Chronic pain       Chronic pancreatitis (H)       idiopathic, Tx: PPI, antioxidants, pancreatic enzymes     Common migraine       Coronary artery disease       Costochondritis       Difficulty in walking(719.7)       Dyspnea on exertion       Ectasia, mammary duct       [facility], persistent nipple discharge     Elevated fasting glucose       Gastro-oesophageal reflux disease       Hernia       History of angina       Hyperlipidemia LDL goal < 100       Hypertension goal BP (blood pressure) < 140/90       Essential hypertension     Iron deficiency anemia       Ischemic  cardiomyopathy       Menorrhagia       Migraine headaches       Mild persistent asthma       Neuritis optic 1997     subacute autoimmune retrobulbar neuritis, Dr. 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 Matias, neg w/u, Rx'd w/prednisone     Ventral hernia, unspecified, without mention of obstruction or gangrene 7/2012           CURRENT MEDICATIONS:  Current Outpatient Prescriptions[]Expand by Default          Current Outpatient Medications   Medication Sig Dispense Refill     acetaminophen (TYLENOL) 325 MG tablet Take 1-2 tablets (325-650 mg) by mouth every 6 hours as needed for pain (headache) 250 tablet 0     albuterol (2.5 MG/3ML) 0.083% neb solution INL 1 VIAL VIA NEBULIZATION Q 4 TO 6 HOURS PRN   1     albuterol (PROAIR HFA, PROVENTIL HFA, VENTOLIN HFA) 108 (90 BASE) MCG/ACT inhaler Inhale 2 puffs into the lungs every 6 hours as needed for shortness of breath / dyspnea or wheezing 3 Inhaler 1     aspirin (ASPIRIN LOW DOSE) 81 MG EC tablet Take 1 tablet (81 mg) by mouth daily 30 tablet 11     blood glucose monitoring (NO BRAND SPECIFIED) meter device kit Use to test blood sugar 4 X times daily or as directed. (Patient taking differently: 1 kit Use to test blood sugar 4 X times daily or as directed.) 1 kit 0     blood glucose monitoring (NO BRAND SPECIFIED) test strip 1 strip by In Vitro route 4 times daily Test as  directed. Please dispense three months and three months of refills. Thank you. (Patient taking differently: 1 strip by In Vitro route 4 times daily Test as directed. Please dispense three months and three months of refills. Thank you.) 360 each 3     Blood Pressure Monitor KIT 1 each daily Monitor home blood pressure as instructed by physician.  Dispense [facility] blood pressure kit. 1 kit 0     calcium carbonate (TUMS) 500 MG chewable tablet Take 3-4 chew tab by mouth daily as needed.         clopidogrel (PLAVIX) 75 MG tablet Take 1 tablet (75 mg) by mouth daily 30 tablet 11     COMPRESSION STOCKINGS 2 each daily Apply one 10-15 mmHg compression stocking to each lower extgmierty during the day and remove before bedtime. 4 each 2     cyclobenzaprine (FLEXERIL) 10 MG tablet Take 1 tablet (10 mg) by mouth 2 times daily as needed for muscle spasms 60 tablet 2     cycloSPORINE (RESTASIS) 0.05 % ophthalmic emulsion Place 1 drop into both eyes 2 times daily 60 each 11     cycloSPORINE (RESTASIS) 0.05 % ophthalmic emulsion Place 1 drop into the right eye every 12 hours         desonide (DESOWEN) 0.05 % cream Apply topically 2 times daily         diclofenac (VOLTAREN) 1 % topical gel Apply 2 g topically 4 times daily Apply to affected joints 100 g 3     dicyclomine (BENTYL) 20 MG tablet TAKE 1 TABLET(20 MG) BY MOUTH FOUR TIMES DAILY AS NEEDED. Pls schedule clinic appt for refills. 60 tablet 0     diphenhydrAMINE (BENADRYL) 25 MG capsule TK 1 TO 2 CS PO QHS   4     EPIPEN 2-RIKY 0.3 MG/0.3ML injection INJECT 0.3 MG INTO THE MUSCLE PRF ANAPHYALAXIS   0     fexofenadine (ALLEGRA) 180 MG tablet Take 1 tablet by mouth daily as needed. 90 tablet 3     fluocinolone (SYNALAR) 0.01 % solution Apply topically daily as needed         fluticasone-vilanterol (BREO ELLIPTA) 100-25 MCG/INH inhaler Inhale 1 puff into the lungs daily         hydroxychloroquine (PLAQUENIL) 200 MG tablet Take 2 tablets (400 mg) by mouth daily 180 tablet 1      insulin pen needle (BD MARCK U/F) 32G X 4 MM USE DAILY OR AS DIRECTED 300 each 3     ketoconazole (NIZORAL) 2 % shampoo Apply topically daily as needed         Ketoprofen POWD 1 g 2 times daily as needed (prn pain) 500 g 3     lidocaine (LMX4) 4 % external cream Apply topically once as needed for mild pain (prn pain) 30 g 3     lifitegrast (XIIDRA) 5 % opthalmic solution Place 1 drop into both eyes 2 times daily 20 each 3     lisinopril-hydrochlorothiazide (PRINZIDE/ZESTORETIC) 20-25 MG tablet Take 1 tablet by mouth daily 30 tablet 11     metFORMIN (GLUCOPHAGE-XR) 500 MG 24 hr tablet TAKE 2 TABLETS(1000 MG) BY MOUTH DAILY WITH DINNER (Patient taking differently: Take 2,000 mg by mouth daily ) 180 tablet 0     metoclopramide (REGLAN) 10 MG tablet Take 1 tablet (10 mg) by mouth 4 times daily (before meals and nightly) 90 tablet 0     metoprolol succinate ER (TOPROL-XL) 100 MG 24 hr tablet Take 1 tablet (100 mg) by mouth daily 30 tablet 11     metroNIDAZOLE (NORITATE) 1 % cream Apply topically daily         montelukast (SINGULAIR) 10 MG tablet Take 1 tablet (10 mg) by mouth At Bedtime 30 tablet 1     Multiple Vitamin (DAILY-GERALD) TABS Take 1 tablet by mouth daily   0     nitroGLYcerin (NITROSTAT) 0.4 MG sublingual tablet Place 1 tablet (0.4 mg) under the tongue every 5 minutes as needed for chest pain 25 tablet 1     nystatin (MYCOSTATIN) 968247 UNITS TABS tablet TK 2 TS PO BID   0     ondansetron (ZOFRAN-ODT) 8 MG ODT tab Take 1 tablet (8 mg) by mouth every 8 hours as needed for nausea 60 tablet 1     pantoprazole (PROTONIX) 40 MG EC tablet Take 1 tablet (40 mg) by mouth daily Pork free tablets. (Patient taking differently: Take 40 mg by mouth as needed Pork free tablets.) 30 tablet 1     pravastatin (PRAVACHOL) 40 MG tablet Take 1 tablet (40 mg) by mouth daily 30 tablet 5     spironolactone (ALDACTONE) 50 MG tablet TAKE 1 TABLET BY MOUTH EVERY DAY TAKE ADDITIONAL 1/2 TABLET BY MOUTH EVERY DAY AS NEEDED FOR WEIGHT GAIN  30 tablet 6     sucralfate (CARAFATE) 1 GM tablet Take 1 tablet (1 g) by mouth 4 times daily 360 tablet 0     traMADol (ULTRAM) 50 MG tablet Take 1 tablet (50 mg) by mouth every 8 hours as needed for moderate pain 60 tablet 0     Triamcinolone Acetonide (NASACORT ALLERGY 24HR NA)           vitamin D2 (ERGOCALCIFEROL) 74821 units (1250 mcg) capsule Take 1 capsule (50,000 Units) by mouth every 7 days 12 capsule 0     ferrous gluconate (FERGON) 324 (38 Fe) MG tablet Take 1 tablet (324 mg) by mouth 2 times daily (with meals) (Patient not taking: Reported on 10/29/2019) 180 tablet 3     folic acid (FOLVITE) 1 MG tablet Take 1 tablet by mouth daily (Patient not taking: Reported on 10/29/2019) 90 tablet 3     Magnesium Oxide -Mg Supplement 250 MG TABS TK 1 T PO BID. REDUCE IF STOOLS LOOSEN   11     ranitidine (ZANTAC) 150 MG tablet Take 1 tablet (150 mg) by mouth 2 times daily (Patient not taking: Reported on 10/29/2019) 180 tablet 1           PAST SURGICAL HISTORY:  Past Surgical History[]Expand by Default         Past Surgical History:   Procedure Laterality Date     C ECHO HEART XTHORACIC,COMPLETE, W/O DOPPLER   04     [facility] WNL, EF 60%     C/SECTION, LOW TRANSVERSE             CARDIAC SURGERY         cardiac stent- recent in 16; 4 other stents     DILATION AND CURETTAGE N/A 2016     Procedure: DILATION AND CURETTAGE;  Surgeon: Nahed Butler MD;  Location: [facility] OR     HC UGI ENDOSCOPY W EUS   08     Dr. Pastrana, possible chronic pancreatitis, fatty liver     HERNIA REPAIR   2012     done at [facility] [place] [facility]     INSERT INTRAUTERINE DEVICE N/A 2016     Procedure: INSERT INTRAUTERINE DEVICE;  Surgeon: Nahed Butler MD;  Location: [facility]     INT UTERINE FIBRIOD EMBOLIZATION   10/29/2014     LAPAROSCOPIC CHOLECYSTECTOMY   08     Dr. Arnol GRUBBS TX, CERVICAL         s/p LEEP     ORBITOTOMY Right 3/15/2016     Procedure: ORBITOTOMY;  Surgeon: Myron Cyr MD;   Location:  [facility]     ORBITOTOMY Right 2017     Procedure: ORBITOTOMY;  RIGHT ORBITOTOMY AND BIOPSY;  Surgeon: Charis Holbrook MD;  Location: [facility]     REPAIR PTOSIS Right 2017     Procedure: REPAIR PTOSIS;  RIGHT UPPER LID PTOSIS REPAIR;  Surgeon: Myron Cyr MD;  Location: [facility]     UPPER GI ENDOSCOPY   10/21/08     mild gastritis, Dr. Hidalgo           ALLERGIES           Allergies   Allergen Reactions     Amoxicillin-Pot Clavulanate       Augmentin         Unknown possible hives and edema     Azithromycin       Diatrizoate Other (See Comments)       Pt wants this listed because she is allergic to shellfish      Imitrex [Sumatriptan]         Severe face/neck/chest tightness and flushing side effects      Penicillins Hives       Unknown      Pork Allergy         Stomach pain, cramping, diarrhea, itching, nausea and headaches     Shellfish Allergy Hives and Swelling     Sulfa Drugs Hives and Swelling     Zithromax [Azithromycin Dihydrate] Swelling       Unknown         FAMILY HISTORY:  Family History[]Expand by Default         Family History   Problem Relation Age of Onset     Heart Disease Father 50         heart attack     Cerebrovascular Disease Father       Diabetes Father       Hypertension Father       Depression Father       C.A.D. Father       Hypertension Mother       Arthritis Mother       Heart Disease Mother           long qt syndrome     Thyroid Disease Mother       C.A.D. Mother       Heart Disease Brother 15         MI at 15, 16.      Diabetes Maternal Uncle       Hypertension Maternal Aunt       Hypertension Maternal Uncle       Arthritis Brother           he passed away, had severe arthritis at age 11     Arthritis Maternal Uncle       Eye Disorder Maternal Uncle           cataracts     Neurologic Disorder Sister           migraines     Neurologic Disorder Sister           migraines     Respiratory Son           asthma     Cerebrovascular Disease Maternal Uncle        ALCON.SUZE.D. Brother       Family History Negative Other           neg for RA, SLE     Unknown/Adopted No family hx of           unknown neurological issues in her family, mother was adopted     Skin Cancer No family hx of           No known family hx of skin cancer           SOCIAL HISTORY:  Social History            Socioeconomic History     Marital status: Single       Spouse name: None     Number of children: 1     Years of education: None     Highest education level: None   Occupational History       Employer: NONE    Social Needs     Financial resource strain: None     Food insecurity:       Worry: None       Inability: None     Transportation needs:       Medical: None       Non-medical: None   Tobacco Use     Smoking status: Current Every Day Smoker       Packs/day: 0.20       Years: 1.00       Pack years: 0.20       Types: Cigarettes       Last attempt to quit: 2/1/2016       Years since quitting: 3.4     Smokeless tobacco: Never Used   Substance and Sexual Activity     Alcohol use: No       Alcohol/week: 0.0 oz     Drug use: No     Sexual activity: Not Currently   Lifestyle     Physical activity:       Days per week: None       Minutes per session: None     Stress: None   Relationships     Social connections:       Talks on phone: None       Gets together: None       Attends Oriental orthodox service: None       Active member of club or organization: None       Attends meetings of clubs or organizations: None       Relationship status: None     Intimate partner violence:       Fear of current or ex partner: None       Emotionally abused: None       Physically abused: None       Forced sexual activity: None   Other Topics Concern     Parent/sibling w/ CABG, MI or angioplasty before 65F 55M? Yes   Social History Narrative     Balanced Diet - sometimes     Osteoporosis Prevention Measures - Dairy servings per day: 2 servings weekly     Regular Exercise -  Yes Describe walking 4 times a week     Dental Exam - NO      Seatbelts used - Yes     Self Breast Exam - Yes     Abuse: Current or Past (Physical, Sexual or Emotional)- No     Do you have any concerns about STD's -  No     Do you feel safe in your environment - No     Guns stored in the home - No     Sunscreen used - Yes     Lipids -  YES - Date: 053102     Glucose -  YES - Date: 012804     Eye Exam - YES - Date: one year ago     Colon Cancer Screening - No     Hemoccults - NO     Pap Test -  YES - Date: 070904, remote history of LEEP     Mammography - YES - Date: last spring, would like to discuss, needs a referral to [facility]     DEXA - NO     Immunizations reviewed and up to date - Yes, last td given in 1997 or 1998         ROS:   Constitutional: No fever, chills, or sweats. Intermittent weight gain/loss   ENT: No visual disturbance, ear ache, epistaxis, sore throat  Allergies/Immunologic: Negative.   Respiratory: No cough, hemoptysia  Cardiovascular: As per HPI  GI: No nausea, vomiting, hematemesis, melena, or hematochezia  : No urinary frequency, dysuria, or hematuria  Integument: Negative  Psychiatric: Negative  Neuro: Negative  Endocrinology: Negative   Musculoskeletal: Negative      Labs   Ref. Range 6/18/2020 15:00   Sodium Latest Ref Range: 133 - 144 mmol/L 136   Potassium Latest Ref Range: 3.4 - 5.3 mmol/L 5.4 (H)   Chloride Latest Ref Range: 94 - 109 mmol/L 107   Carbon Dioxide Latest Ref Range: 20 - 32 mmol/L 25   Urea Nitrogen Latest Ref Range: 7 - 30 mg/dL 32 (H)   Creatinine Latest Ref Range: 0.52 - 1.04 mg/dL 1.80 (H)   GFR Estimate Latest Ref Range: >60 mL/min/1.73_m2 31 (L)   GFR Estimate If Black Latest Ref Range: >60 mL/min/1.73_m2 36 (L)   Calcium Latest Ref Range: 8.5 - 10.1 mg/dL 8.6   Anion Gap Latest Ref Range: 3 - 14 mmol/L 5   Albumin Latest Ref Range: 3.4 - 5.0 g/dL 3.6   Protein Total Latest Ref Range: 6.8 - 8.8 g/dL 7.1   Bilirubin Total Latest Ref Range: 0.2 - 1.3 mg/dL 0.3   Alkaline Phosphatase Latest Ref Range: 40 - 150  U/L 72   ALT Latest Ref Range: 0 - 50 U/L 29   AST Latest Ref Range: 0 - 45 U/L 26   Hemoglobin A1C Latest Ref Range: 0 - 5.6 % 8.0 (H)   Cholesterol Latest Ref Range: <200 mg/dL 102   HDL Cholesterol Latest Ref Range: >49 mg/dL 30 (L)   LDL Cholesterol Calculated Latest Ref Range: <100 mg/dL 50   Non HDL Cholesterol Latest Ref Range: <130 mg/dL 71   Triglycerides Latest Ref Range: <150 mg/dL 104   Glucose Latest Ref Range: 70 - 99 mg/dL 250 (H)     Assessment and Plan     We discussed the results with patient.  We discussed the importance of a heart healthy diet and  Lifestyle.  We discussed the importance of a diabetes diet.    Medication Changes:   - Reduce spironolactone to 0.5 tablet (25 mg) every day.  - If blood pressure is below 120 mmHg, decrease lisinopril-hydrochlorothiazide to 0.5 tablet (10 mg -12.5 mg).  Contact clinic if this occurs.  - Stop Aspirin for a few days to see if your GI upset improves.  Contact clinic to update on status of symptoms after this time.  - Continue Plavix     Recommendations:   - Complete non-fasting metabolic panel in one week.  - Continue to monitor your home blood pressures and pulse daily.       Studies Ordered: Echocardiogram in about one week.     The results from today include: Labs.    Follow-up labs   Ref. Range 7/2/2020 08:28   Sodium Latest Ref Range: 133 - 144 mmol/L 138   Potassium Latest Ref Range: 3.4 - 5.3 mmol/L 3.3 (L)   Chloride Latest Ref Range: 94 - 109 mmol/L 105   Carbon Dioxide Latest Ref Range: 20 - 32 mmol/L 25   Urea Nitrogen Latest Ref Range: 7 - 30 mg/dL 35 (H)   Creatinine Latest Ref Range: 0.52 - 1.04 mg/dL 1.47 (H)   GFR Estimate Latest Ref Range: >60 mL/min/1.73_m2 40 (L)   GFR Estimate If Black Latest Ref Range: >60 mL/min/1.73_m2 46 (L)   Calcium Latest Ref Range: 8.5 - 10.1 mg/dL 9.5   Anion Gap Latest Ref Range: 3 - 14 mmol/L 8   Albumin Latest Ref Range: 3.4 - 5.0 g/dL 4.4   Protein Total Latest Ref Range: 6.8 - 8.8 g/dL 8.0   Bilirubin  Total Latest Ref Range: 0.2 - 1.3 mg/dL 0.4   Alkaline Phosphatase Latest Ref Range: 40 - 150 U/L 86   ALT Latest Ref Range: 0 - 50 U/L 34   AST Latest Ref Range: 0 - 45 U/L 23   Glucose Latest Ref Range: 70 - 99 mg/dL 150 (H)       Please follow up: With Dr. Peace based on results of testing.      30 min were spent directly with patient.    Milan Peace MD, PhD  Professor of Medicine  Division of Cardiology    Please do not hesitate to contact me if you have any questions/concerns.     Sincerely,     Milan Peace MD

## 2020-06-29 NOTE — TELEPHONE ENCOUNTER
Left message letting pt know she can get her Rituximab in December.    Desiree Godinez RN  [facility]

## 2020-07-01 ENCOUNTER — VIRTUAL VISIT (OUTPATIENT)
Dept: RHEUMATOLOGY | Facility: CLINIC | Age: 55
End: 2020-07-01
Attending: INTERNAL MEDICINE
Payer: COMMERCIAL

## 2020-07-01 DIAGNOSIS — M31.30 GRANULOMATOSIS WITH POLYANGIITIS, UNSPECIFIED WHETHER RENAL INVOLVEMENT (H): Primary | ICD-10-CM

## 2020-07-01 DIAGNOSIS — Z51.81 ENCOUNTER FOR MEDICATION MONITORING: ICD-10-CM

## 2020-07-01 DIAGNOSIS — E78.5 HYPERLIPIDEMIA LDL GOAL <70: ICD-10-CM

## 2020-07-01 DIAGNOSIS — I10 HTN (HYPERTENSION): ICD-10-CM

## 2020-07-01 RX ORDER — FLUOCINONIDE 0.5 MG/G
OINTMENT TOPICAL PRN
COMMUNITY
End: 2022-08-19

## 2020-07-01 ASSESSMENT — PAIN SCALES - GENERAL: PAINLEVEL: MILD PAIN (3)

## 2020-07-01 NOTE — PROGRESS NOTES
"Janine Cornell is a 55 year old female who is being evaluated via a billable telephone visit.      The patient has been notified of following:     \"This telephone visit will be conducted via a call between you and your physician/provider. We have found that certain health care needs can be provided without the need for a physical exam.  This service lets us provide the care you need with a short phone conversation.  If a prescription is necessary we can send it directly to your pharmacy.  If lab work is needed we can place an order for that and you can then stop by our lab to have the test done at a later time.    Telephone visits are billed at different rates depending on your insurance coverage. During this emergency period, for some insurers they may be billed the same as an in-person visit.  Please reach out to your insurance provider with any questions.    If during the course of the call the physician/provider feels a telephone visit is not appropriate, you will not be charged for this service.\"    Patient has given verbal consent for Telephone visit?  Yes    What phone number would you like to be contacted at? [phone]    How would you like to obtain your AVS? Mail a copy    Phone call duration: 15 minutes    Majo Mckinnon MD      If you call pt first time and doesn't answer, please call back.  Mei Hawkins CMA  7/1/2020 2:36 PM    "

## 2020-07-01 NOTE — PROGRESS NOTES
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 Cornell is a 49 yo AAF who was referred to our clinic for evaluation and management of her joint pain in setting of positive RF 26.    Her joint symptoms first started more than a year ago, but over last 6-8 months fluctuating some good and bad days . All her joints are involved. Over last 5 months, hands became swollen, warm and painful. Tylenol does not help. Ketoprofen did not help. Was told to avoid NSAIDs given ACS. Reports AM/PM stiffness x 2-3 hr, can't make full fist when wakes up in AM.    She feels tired all the time. Reports worsening hair loss and unchanged  facial rash. Gets red sore flaky rash over cheeks across her face which is intermittent diagnosed Rosacea and is prescribed metronidazole gel which she has not started using. Reports occasional oral ulcers. Hands feel cold with red discoloration last winter. Has occasional dysphagia to both solids and liquid. Has dry eyes, is using OTC allergy eyedrops. Has chronic abdominal pain. Has h/o pancreatitis. Sometime has anxiety. Occasionally has numbness, tingling in fingers/toes. Has back and spine issues, her whole back and neck hurts, different areas at different time. She is wondering if she has FMS. Has hard time sleeping, has never been diagnosed with BRENNA. She does not know if he snores. She was found to have slightly positive RF in 2/2013. Has microcytic anemia.     Her arthralgia gets worse with drop in temperature. Reports body ache. Activity makes her pain worse. Her joint swelling isintermittent. Her body pain and joint pain is the same. Reports AM stiffness x 3 hr.    She has been doing acupuncture x few months and it is helping with her pain. She thinks  that the combination of plaquenil and acupuncture is helpful but overall she notice 30% in her symptoms relief.     Takes tylenol and tramadol on occasion for pain.    She decided to use different formulation of metformin which helped with her abdominal pain. I referred her to GI for evaluation of elevated lipase and abdominal pain. She decided to see GI in the future.     She is on  mg qd since 5/2014, tolerates it well and it helps her some. Denies taking any gabapentin due to chest pain and headches. She has had referral her for water aerobics, but she can't begin until she's healed from recent surgery in 10/2014. She thinks HCQ is helping but not enough to control all her pain, reports 2-3 hr of AM stiffness with ongoing diffuse arthralgia/myalgia along with intermittent swelling of hands. She does see her acupuncture person and it helps with her pain, has not started water aerobics yet. Has got her flu shot.     10/2015: Reports having pleuritic CP in 3/2015, was prescribed Lidoderm patches first. It did not help. Took prednisone (?dose) by her own, pain got better but it recurred off prednisone and gradually got worse to the point that she could not stand the pain anymore, was seen in ER in 5/2015, was treated with medrol dose pack which helped. Reports pain over hips, knees, ankles and fingers. Pain gets worse with walking. Reports myalgia, swelling of the arms. Pain over neck, shoulders. Has AM stiffness x 3-4 hr. Fingers swell up and become painful. Can't remember if steroid helped with joint pain. She had headaches, severe CP when she took tramadol and gabapentin and stopped taking them, sx resolved but she can't tell which one caused SEs but thinks that probably it was gabapentin. She has good days and tries to be more active but activity aggravates the pain. Headaches are intermittent. HCQ helps some not enough to control all the pain. No HCQ SEs. Had eye exam around July 2015. Flexeril helps but  PCP wants to change flexeril to zanaflex, reporting that she is NOT comfortable with the change. Acupuncture still helps an she continued to  do that. Concerned about fat pads she has in supraclavicular area, has h/o thyroid nodules. Continues having hair loss, takes iron for iron deficiency.     2/2016: She has 2 major complaints today, increased joint pain/swelling in hands/feet and recent Dx of optic neuritis in R eye, reports having similar problem about 20 yr ago, now recurred. Has a spot in R eye vision x long term, was seen by ophthalmology few days ago and was told to have optic neuritis. Gets joint pain, muscle pain. Can't  objects, hands are swollen. Knees, hips and ankles are painful. Reports more arthralgia. AM stiffness/ pain is 3-4 hr. She wants me to talk to her ophthalmologist directly.    She had botox inj for migraines which did not help her. She is going to have angiogram next wk, had to take more nitro for CP recently.     4/29/2016: She reports being on steroid taper x 3 since last visit. Reportedly, had orbit MRI, it showed swelling/inflamamtion of R lacrimal glands. She had painful swelling of her R eye, cause her headaches. Botox inj made her headaches worse. She is being dealing with this since 1/2016, with severe headaches and pain/swelling around R eye. Had biopsy in 3/2016. She is frustrated as prednisone causes her weight gain and her BG is difficult to control on it.    5/2016: At last visit, was prescribed MTX 10 mg po qwk to take along with FA 1 mg qd. She decided not to take MTX, is afraid of side effects, believes all these medications would harm her. She also believes that botox caused her inflammation around R eye. No major flare up of per-orbital inflamamtion since last visit but continues to have swelling around her R eye.    11/2016: Reports diffuse body ache, arthralgia, myalgia especially with weather change. No major flare up of campos-orbital inflammation but her  face/around R eye swells up from time to time. Reports 2 episodes of CP over past 2 mo, nitro sometimes helps and sometimes does not help. She has asthma and costochondritis and she has hard time to distinguish the origin of pain. Sometimes knees and fingers swell up. Her stiffness is sometimes all day.    4/2017:  Reports having sinusitis since last visit. Her R eye is dry and is using eyedrops to keep it moist. Reports having pain in ears. Was treated with antibiotics by PCP, it got better then it got worse. Reports catching infections easily. Then started having pressure over eyes with pain/headaches (exact start date is unknown). Pain gradually got worse and became persistent. Had sinus CT.     4/7/2017: Having a flare up of swelling, pain around her R orbit. Has not tried MTX yet.    5/2017: On MTX 10 mg po qwk since 4/7/2017, reports increased stomach pain and nausea and new headaches since start of MTX and it's not helping. Pain/swelling around R orbit is worse.    6/2017: She finished prednisone taper prescribed at last visit, R campos-orbital swelling significantly improved. 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    2/2018: R campos-orbital swelling has improved. Repeat R lacrimal gland biopsy in 8/2017 showed non specific inflammation with no lymphoma. She is off steroid. Did not tolerate AZA due to N/V and abdominal pain.    Is back with recurrence of R campos-orbital sweling x past 2 months. Pain really got worse over past 3wk, requries     9/2018: Declined ritiximab at last visit, lost f/u since 2/2018. Went to [place] and was seen on 8/29 by Dr. 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 Reportedly he ordered labs and recommended surgery since the inflammation of the R eye is back and is pushing the eye down. Janine took some  prednsione 30 mg every day few days a go for pain.    3/2019: She had lateral orbitotomy and debulking orbital mass right eye on 9/26/18 with Dr. Shah and Dr. Valdez at [place]. 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 There were no complications according to the op note.    Janine finally agreed to receive rituximab for her GPA. She had it as 1 gr q2wk x 2 on 12/12/2018 and 12/26/2018. Had minor allergic reaction which was managed by IV solu cortef and benadryl. She is off prednisone about 6wk after surgery. Had bleeding ulcer from prednsione. Has pain over sinus, ears. Still has residual pain, swelling around her R eye is concerned about it. Wants to transfer her care to ophthalmology here as it's closer to her.    Cold induced sweating congestion joint pain  Facial swelling  allegy doctor clear ear pft ok doxy sinusitis     (7/12/19):  Patient completed her 2 rounds of rituximab in June. She tolerated well. She was recently seen by Dr. Vernon in [facility] and repeat CT scan of orbits shows some decrease in proptosis. She reports that her R eye still intermittently feels puffy. She also mentions intermittent swelling in her arms. Unclear of any triggers. Limbs are also heavy with muscle aches and some joint pains. Exacerbated with activity.    10/29/2019:    Not feeling well with headaches, diffuse arthralgia and myalgia being worse over past 2 months. Headaches wax and wane and never go away. Gets swelling around her R eyes with numbness/tingling of R cheek.    Gets hives randomly, over trunk, extremities.    Has shortness of breath on walking fast. Gets cough from asthma, nothing new. No hemoptysis. Fingers start to hurt and itch with drop in T.       2/21/2020:    Not feeling well. Reports SOB, pleuritic CP, dry cough since early Feb. Feels retaining fluid. Gets headaches. Her cardiologist recommended cardiac cath. Tylenol is not helping. T was 101 last wk, not today, took tylenol to drop tylenol and  used ice pack on her head and had lots of water and tea, has poor appetite. Feeling better but cough is still there, worse in AM and night. First had blood in sputum but does not have it anymore. Has diffuse arthralgia, myalgia, worse x past 3 months due to cold weather. Fatigue is worse. No pleuritic CP today. No fevers today but feels short of breath a lot (became chronic), worse with activity.    No rash.    Her eye swelling/pseudotumor is quiet, still has residual numbness from surgery, gets headaches.    Saw Dr. Vernon in 1/2020, Dr. Vernon thought that pseudotumor responded to rituximab.      Last rituximab was in 12/5/2019 and 12/19/2019.    Today 2/21/2020:    Overall stable. No flare up. No change since in symptoms last visit. Last rituximab was 1 gr on 6/18/2020, next scheduled for 7/2/2020.    Component      Latest Ref Rng 2/28/2013 2/28/2013          12:14 PM 12:14 PM   WBC      4.0 - 11.0 10e9/L     RBC Count      3.8 - 5.2 10e12/L     Hemoglobin      11.7 - 15.7 g/dL     Hematocrit      35.0 - 47.0 %     MCV      78 - 100 fl     MCH      26.5 - 33.0 pg     MCHC      31.5 - 36.5 g/dL     RDW      10.0 - 15.0 %     Platelet Count      150 - 450 10e9/L     Specimen Description           Lyme Screen IgG and IgM           Vitamin D Deficiency screening      30 - 75 ug/L     Ferritin      10 - 300 ng/mL     Sed Rate      0 - 20 mm/h     ALLI Screen by EIA      <1.0     Rheumatoid Factor      0 - 14 IU/mL     CK Total      30 - 225 U/L  78   Uric Acid      2.5 - 7.5 mg/dL 6.7      Component      Latest Ref Rng 2/28/2013          12:14 PM   WBC      4.0 - 11.0 10e9/L    RBC Count      3.8 - 5.2 10e12/L    Hemoglobin      11.7 - 15.7 g/dL    Hematocrit      35.0 - 47.0 %    MCV      78 - 100 fl    MCH      26.5 - 33.0 pg    MCHC      31.5 - 36.5 g/dL    RDW      10.0 - 15.0 %    Platelet Count      150 - 450 10e9/L    Specimen Description       Serum   Lyme Screen IgG and IgM       Test value: <0.75....Interpretation:  Negative....If you highly suspect Lyme . . .   Vitamin D Deficiency screening      30 - 75 ug/L    Ferritin      10 - 300 ng/mL    Sed Rate      0 - 20 mm/h    ALLI Screen by EIA      <1.0    Rheumatoid Factor      0 - 14 IU/mL    CK Total      30 - 225 U/L    Uric Acid      2.5 - 7.5 mg/dL      Component      Latest Ref Rng 2/28/2013 2/28/2013 2/28/2013 2/28/2013          12:14 PM 12:14 PM 12:14 PM 12:14 PM   WBC      4.0 - 11.0 10e9/L       RBC Count      3.8 - 5.2 10e12/L       Hemoglobin      11.7 - 15.7 g/dL       Hematocrit      35.0 - 47.0 %       MCV      78 - 100 fl       MCH      26.5 - 33.0 pg       MCHC      31.5 - 36.5 g/dL       RDW      10.0 - 15.0 %       Platelet Count      150 - 450 10e9/L       Specimen Description             Lyme Screen IgG and IgM             Vitamin D Deficiency screening      30 - 75 ug/L       Ferritin      10 - 300 ng/mL    10   Sed Rate      0 - 20 mm/h   23 (H)    ALLI Screen by EIA      <1.0  <1.0 . . .     Rheumatoid Factor      0 - 14 IU/mL 26 (H)      CK Total      30 - 225 U/L       Uric Acid      2.5 - 7.5 mg/dL         Component      Latest Ref Rn 2/28/2013 2/28/2013          12:14 PM 12:14 PM   WBC      4.0 - 11.0 10e9/L 14.1 (H)    RBC Count      3.8 - 5.2 10e12/L 4.55    Hemoglobin      11.7 - 15.7 g/dL 10.7 (L)    Hematocrit      35.0 - 47.0 % 33.3 (L)    MCV      78 - 100 fl 73 (L)    MCH      26.5 - 33.0 pg 23.5 (L)    MCHC      31.5 - 36.5 g/dL 32.1    RDW      10.0 - 15.0 % 18.1 (H)    Platelet Count      150 - 450 10e9/L 407    Specimen Description           Lyme Screen IgG and IgM           Vitamin D Deficiency screening      30 - 75 ug/L  32   Ferritin      10 - 300 ng/mL     Sed Rate      0 - 20 mm/h     ALLI Screen by EIA      <1.0     Rheumatoid Factor      0 - 14 IU/mL     CK Total      30 - 225 U/L     Uric Acid      2.5 - 7.5 mg/dL          MRI CERVICAL SPINE WITHOUT CONTRAST 4/3/2013 12:47 PM    HISTORY: Cervicalgia. Degeneration of cervical  intervertebral disc.  MRI cervical spine. Evaluate bilateral supraclavicular lymph nodes.  Clinical enlargement.    TECHNIQUE: Multiplanar multisequence MRI of the cervical spine  without gadolinium contrast.    COMPARISON: None.    FINDINGS: The patient has a developmentally small central canal.  Images of the cervical cord reveals small areas of T2 hyperintensity  within the cervical cord. There is a small area of high signal  intensity at the C1 level. There is also a small area of high signal  intensity at the C6-C7 level. The area of high signal at C6-C7 is  located at an area of central spinal stenosis. This may be due to  myelomalacia. The area of high signal at C1-C2 is indeterminate.    C2-C3: Normal disc, facet joints, spinal canal and neural foramina.    C3-C4: Normal disc, facet joints, spinal canal and neural foramina.    C4-C5: Normal disc, facet joints, spinal canal and neural foramina.    C5-C6: There is degeneration of the disc. There is a mild annular  disc bulge. The central canal is mild-moderately narrowed. The  residual AP diameter of the central spinal canal measures  approximately 9 mm.    C6-C7: There is degeneration of the disc. There is loss of disc space  height. There is a diffuse annular disc bulge. There are associated  posterior osteophytes. There are severe central spinal stenosis at  this level. The residual AP diameter of the central spinal canal is  only about 6 mm. There is significant flattening of the cord. There  is high signal in the cord at this level consistent with  myelomalacia. There is moderate to severe right foraminal stenosis  due to and uncinate spur.    C7-T1: Normal disc, facet joints, spinal canal and neural foramina.            Result Impression       IMPRESSION:  1. Severe central spinal stenosis at C6-C7 due to a developmentally  small canal and due to a bulging disc and associated posterior  osteophyte. There is flattening of the cord at this level and  high  signal in the cord at this level consistent with myelomalacia.  2. There is a small, indeterminate 2-3 mm area of high signal  intensity in the cord at the C1 level. This could be due to gliosis  secondary to a previous infectious or inflammatory process.  Demyelinating disease could also have a similar appearance. Clinical  correlation suggested.    GAIL MORE MD     MRI LEFT UPPER EXTREMITY NON-JOINT WITHOUT CONTRAST, MRI RIGHT UPPER  EXTREMITY NON-JOINT WITHOUT CONTRAST April 3, 2013 1:32 PM    HISTORY: Cervicalgia. Degeneration of cervical intervertebral disc.    TECHNIQUE: Multiplanar, multisequence imaging of the brachial plexus  was performed without gadolinium contrast enhancement.    COMPARISON: None.    FINDINGS: No mass lesions are seen. No inflammatory process is  identified. The roots, trunks, branches, and divisions of both the  right and left brachial plexus appear normal. No adenopathy is seen.  The anterior scalene muscles and the middle scalene muscles appear  normal. Nodules are seen within the thyroid gland. The largest nodule  is seen in the left lobe of the thyroid. This measures about 1.8 cm  in diameter.            Result Impression       IMPRESSION:  1. Both the right and left brachial plexus regions appear normal.  2. Thyroid nodules. The largest nodule is in the left lobe of the  thyroid. It measures about 1.8 cm in diameter.       XR WRIST BILATERAL G/E 3 VIEWS    Narrative:        EXAMINATION:  1. Each hand 3 views  2. Each wrist 3 views     DATE: 5/1/2013     HISTORY: Arthropathy with concern for rheumatoid.     FINDINGS: 3 views of each hand and 3 views of each wrist are obtained  without prior for comparison. Alignment is normal. There is no  fracture or acute bone abnormality. No distinct erosions are seen.  Some spurring of the distal radial ulnar joint is noted on the right.       Impression:     IMPRESSION:  1. No evidence of an inflammatory arthropathy involving either  hand  or wrist.     VIELKA GUEVARA MD         XR FOOT BILATERAL G/E 3 VIEWS    Narrative:        EXAMINATION:  1. Each foot 3 views  2. Each ankle 3 views  3. Sacroiliac joint series     DATE: 5/1/2013     HISTORY: Arthropathy; concern for rheumatoid.     FINDINGS: No prior for comparison.     A frontal and bilateral oblique evaluation of the sacroiliac joints  shows no malalignment. Some patchy sclerosis is identified along the  central aspect of both sacroiliac joints, which is favored to be  degenerative. No distinct erosions are seen. The visualized portion  of the hip joint spaces appear maintained, with no erosive changes  identified. Mild endplate spurring is noted in the lower lumbar  spine.     3 views of each foot and 3 views of each ankle show no evidence of  acute fracture or dislocation. The metatarsal phalangeal,  tarsometatarsal and intertarsal joint spaces appear maintained. No  erosions are identified. There is no sign of acroosteolysis. The  ankle mortise and talar dome are intact bilaterally. Minimal spurring  is noted along the tip of the medial malleolus bilaterally.       Impression:     IMPRESSION:  1. Patchy sclerosis identified along the central aspect of both  sacroiliac joints, which is favored to be degenerative. No distinct  erosions are seen.  2. No evidence of an inflammatory arthropathy in either foot or ankle.     VIELKA GUEVARA MD     5/2013  CBC WITH PLATELETS DIFFERENTIAL       Component Value Range    WBC 11.3 (*) 4.0 - 11.0 10e9/L    RBC Count 4.56  3.8 - 5.2 10e12/L    Hemoglobin 11.1 (*) 11.7 - 15.7 g/dL    Hematocrit 34.3 (*) 35.0 - 47.0 %    MCV 75 (*) 78 - 100 fl    MCH 24.3 (*) 26.5 - 33.0 pg    MCHC 32.4  31.5 - 36.5 g/dL    RDW 16.1 (*) 10.0 - 15.0 %    Platelet Count 315  150 - 450 10e9/L    Diff Method Automated Method      % Neutrophils 71.6  40 - 75 %    % Lymphocytes 20.9  20 - 48 %    % Monocytes 4.3  0 - 12 %    % Eosinophils 2.8  0 - 6 %    % Basophils 0.2  0 - 2 %    %  Immature Granulocytes 0.2  0 - 0.4 %    Absolute Neutrophil 8.1  1.6 - 8.3 10e9/L    Absolute Lymphocytes 2.4  0.8 - 5.3 10e9/L    Absolute Monoctyes 0.5  0.0 - 1.3 10e9/L    Absolute Eosinophils 0.3  0.0 - 0.7 10e9/L    Absolute Basophils 0.0  0.0 - 0.2 10e9/L    Abs Immature Granulocytes 0.0  0 - 0.03 10e9/L   AMYLASE       Component Value Range    Amylase 103  30 - 110 U/L   COMPREHENSIVE METABOLIC PANEL       Component Value Range    Sodium 144  133 - 144 mmol/L    Potassium 3.8  3.4 - 5.3 mmol/L    Chloride 105  94 - 109 mmol/L    Carbon Dioxide 23  20 - 32 mmol/L    Anion Gap 17  6 - 17 mmol/L    Glucose 173 (*) 60 - 99 mg/dL    Urea Nitrogen 13  5 - 24 mg/dL    Creatinine 0.83  0.52 - 1.04 mg/dL    GFR Estimate 74  >60 mL/min/1.7m2    GFR Estimate If Black 90  >60 mL/min/1.7m2    Calcium 9.7  8.5 - 10.4 mg/dL    Bilirubin Total 0.4  0.2 - 1.3 mg/dL    Albumin 4.3  3.9 - 5.1 g/dL    Protein Total 7.8  6.8 - 8.8 g/dL    Alkaline Phosphatase 66  40 - 150 U/L    ALT 36  0 - 50 U/L    AST 28  0 - 45 U/L   CK TOTAL       Component Value Range    CK Total 66  30 - 225 U/L   CRP INFLAMMATION       Component Value Range    CRP Inflammation 10.4 (*) 0.0 - 8.0 mg/L   LIPASE       Component Value Range    Lipase 353 (*) 20 - 250 U/L   ERYTHROCYTE SEDIMENTATION RATE AUTO       Component Value Range    Sed Rate 26 (*) 0 - 20 mm/h   ROUTINE UA WITH MICROSCOPIC REFLEX TO CULTURE       Component Value Range    Color Urine Yellow      Appearance Urine Slightly Cloudy      Glucose Urine 30 (*) NEG mg/dL    Bilirubin Urine Negative  NEG    Ketones Urine 5 (*) NEG mg/dL    Specific Gravity Urine 1.026  1.003 - 1.035    Blood Urine Trace (*) NEG    pH Urine 5.0  5.0 - 7.0 pH    Protein Albumin Urine 10 (*) NEG mg/dL    Urobilinogen mg/dL Normal  0.0 - 2.0 mg/dL    Nitrite Urine Negative  NEG    Leukocyte Esterase Urine Negative  NEG    Source Midstream Urine      WBC Urine 1  0 - 2 /HPF    RBC Urine 4 (*) 0 - 2 /HPF    Squamous  Epithelial /HPF Urine <1  0 - 1 /HPF    Mucous Urine Present (*) NEG /LPF    Hyaline Casts 3 (*) 0 - 2 /LPF    Calcium Oxalate Moderate (*) NEG /HPF   COMPLEMENT C3       Component Value Range    Complement C3 143  76 - 169 mg/dL   COMPLEMENT C4       Component Value Range    Complement C4 31  15 - 50 mg/dL   HEPATITIS C ANTIBODY       Component Value Range    Hepatitis C Antibody Negative  NEG   CARDIOLIPIN ANTIBODY IGG AND IGM       Component Value Range    Cardiolipin IgG Marline    0 - 15.0 GPL    Value: <15.0      Interpretation:  Negative    Cardiolipin IgM Marline    0 - 12.5 MPL    Value: <12.5      Interpretation:  Negative   LUPUS PANEL       Component Value Range    Lupus Result    NEG    Value: Negative      (Note)      COMMENTS:      The INR is normal.      APTT is normal.  1:2 Mix is not indicated.      DRVVT Screen is normal.      Thrombin time is normal.      NEGATIVE TEST; A LUPUS ANTICOAGULANT WAS NOT DETECTED IN THIS      SPECIMEN WITHIN THE LIMITS OF THE TESTING REPERTOIRE.      If the clinical picture is strongly suggestive of an antiphospholipid      syndrome, recommend anticardiolipin and beta-2-glycoprotein (IgG and      IgM) antibody tests.      Leela Franks M.D.  [phone]      5/2/2013            INR =  0.93 N = 0.86-1.14  (No additional charge)      TT = 15.7 N = 13.0-19.0 sec  (No additional charge)            APTT'S:    Seconds      Reagent =  Stago LA      Normal  =  38      Patient  =  35      1:2 Mix  =  N/A      Reference =  31-43             DILUTE MADELINE VIPER VENOM TEST:      DRVVT Screen Ratio = 0.76 Normal = <1.21         IMMUNOGLOBULIN G SUBCLASSES       Component Value Range    IGG 1030  695 - 1620 mg/dL    IgG1 488  300 - 856 mg/dL    IgG2 325  158 - 761 mg/dL    IgG3 47  24 - 192 mg/dL    IgG4 18  11 - 86 mg/dL   SUNNY ANTIBODY PANEL       Component Value Range    Ribonucleic Protein IgG Antibody 0      Smith Antibody IgG 1      SSA (RO) Antibody IgG 4      SSB (LA)  Antibody IgG 0      Scleroderma Antibody IgG 0     BETA 2 GLYCOPROTEIN ANTIBODIES IGG IGM       Component Value Range    Beta-2-Glycopro IgG 1      Beta-2-Glycopro IgM 5     CYCLIC CIT PEPT IGG       Component Value Range    Cyclic Cit Pept IgG/IgA    <20 UNITS    Value: <20      Interpretation:  Negative   DNA DOUBLE STRANDED ANTIBODIES       Component Value Range    DNA-ds    0 - 29 IU/mL    Value: <15      Interpretation:  Negative       CT CHEST PULMONARY EMBOLISM W CONTRAST 5/20/2015 4:57 PM  HISTORY: Pain. SOB. Elevated d-dimer.   TECHNIQUE: 65 mL Isovue 370. Axial images with coronal  reconstructions.  COMPARISON: None.  FINDINGS: Calcified granuloma with surrounding scarring in the  posterolateral left upper lobe. Triangular shaped opacity at the right  lung base in the lateral right middle lobe could represent some  scarring, atelectasis or infiltrate. There is also some scarring or  atelectasis in the posteromedial right lung base. Lungs otherwise  clear.  The pulmonary arteries are well opacified. No CT evidence for acute  pulmonary embolus. No aortic dissection.  Diffuse fatty infiltration of the liver.  IMPRESSION  IMPRESSION:   1. No pulmonary embolus identified.  2. Small focus of atelectasis, infiltrate or scarring in the lateral  right middle lobe.  3. Old granulomatous disease.  4. Otherwise negative.  SILVERIO VAZQUEZ MD    Results for FAVIO MARTINEZ (MRN [id number]) as of 10/30/2015 17:00   Ref. Range 8/21/2012 09:06 5/22/2013 14:22 4/14/2014 12:06 9/11/2014 12:35 12/4/2014 16:38   TSH Latest Range: 0.40-4.00 mU/L 0.83 0.43 0.27 (L) 0.23 (L) 0.22 (L)     Component      Latest Ref Rng 10/30/2015   WBC      4.0 - 11.0 10e9/L 13.4 (H)   RBC Count      3.8 - 5.2 10e12/L 4.76   Hemoglobin      11.7 - 15.7 g/dL 12.4   Hematocrit      35.0 - 47.0 % 37.9   MCV      78 - 100 fl 80   MCH      26.5 - 33.0 pg 26.1 (L)   MCHC      31.5 - 36.5 g/dL 32.7   RDW      10.0 - 15.0 % 14.5   Platelet Count      150 -  450 10e9/L 324   Diff Method       Automated Method   % Neutrophils       67.7   % Lymphocytes       22.7   % Monocytes       6.3   % Eosinophils       2.7   % Basophils       0.4   % Immature Granulocytes       0.2   Absolute Neutrophil      1.6 - 8.3 10e9/L 9.1 (H)   Absolute Lymphocytes      0.8 - 5.3 10e9/L 3.1   Absolute Monoctyes      0.0 - 1.3 10e9/L 0.9   Absolute Eosinophils      0.0 - 0.7 10e9/L 0.4   Absolute Basophils      0.0 - 0.2 10e9/L 0.1   Abs Immature Granulocytes      0 - 0.4 10e9/L 0.0   Creatinine      0.52 - 1.04 mg/dL 1.21 (H)   GFR Estimate      >60 mL/min/1.7m2 47 (L)   GFR Estimate If Black      >60 mL/min/1.7m2 57 (L)   Iron      35 - 180 ug/dL 70   Iron Binding Cap      240 - 430 ug/dL 428   Iron Saturation Index      15 - 46 % 16   Albumin      3.4 - 5.0 g/dL 4.6   ALT      0 - 50 U/L 29   AST      0 - 45 U/L 21   CRP Inflammation      0.0 - 8.0 mg/L <2.9   Sed Rate      0 - 20 mm/h 8   Vitamin D Deficiency screening      20 - 75 ug/L 46   Ferritin      8 - 252 ng/mL 18   TSH      0.40 - 4.00 mU/L 0.49   T4 Free      0.76 - 1.46 ng/dL 1.06   Free T3      2.3 - 4.2 pg/mL 2.8     Component      Latest Ref Rng 11/17/2015   Testosterone Total      8 - 60 ng/dL 19   Sex Hormone Binding Globulin      30 - 135 nmol/L 32   Free Testosterone Calculated      0.11 - 0.58 ng/dL 0.34   Vitamin A       0.61   Retinol Palmitate       <0.02 . . .   Vitamin A Interp       Normal . . .   Thyroglobulin Antibody      <40 IU/mL <20   Thyroid Peroxidase Antibody      <35 IU/mL 31   DHEA Sulfate      35 - 430 ug/dL 101   Zinc       68     Component      Latest Ref Rng 1/27/2016   Sodium      133 - 144 mmol/L 135   Potassium      3.4 - 5.3 mmol/L 4.0   Chloride      94 - 109 mmol/L 104   Carbon Dioxide      20 - 32 mmol/L 24   Anion Gap      3 - 14 mmol/L 7   Glucose      70 - 99 mg/dL 88   Urea Nitrogen      7 - 30 mg/dL 20   Creatinine      0.52 - 1.04 mg/dL 1.13 (H)   GFR Estimate      >60 mL/min/1.7m2 51  (L)   GFR Estimate If Black      >60 mL/min/1.7m2 62   Calcium      8.5 - 10.1 mg/dL 9.4   Bilirubin Total      0.2 - 1.3 mg/dL 0.7   Albumin      3.4 - 5.0 g/dL 4.3   Protein Total      6.8 - 8.8 g/dL 7.7   Alkaline Phosphatase      40 - 150 U/L 66   ALT      0 - 50 U/L 24   AST      0 - 45 U/L 18   Cholesterol      <200 mg/dL 112   Triglycerides      <150 mg/dL 100   HDL Cholesterol      >49 mg/dL 34 (L)   LDL Cholesterol Calculated      <100 mg/dL 58   Non HDL Cholesterol      <130 mg/dL 78   N-Terminal Pro Bnp      0 - 125 pg/mL 29   Hemoglobin A1C      4.3 - 6.0 % 7.0 (H)   Amylase      30 - 110 U/L 60   Lipase      73 - 393 U/L 394 (H)   Troponin I ES      0.000 - 0.045 ug/L <0.015 . . .     Component      Latest Ref Rng 2/24/2016   Angiotensin Converting Enzyme       <5 (L) . . .   Neutrophil Cytoplasmic IgG Antibody       <1:20 . . .   Sed Rate      0 - 20 mm/h 86 (H)     Copath Report      Patient Name: FAVIO MARTINEZ   MR#: [id number]   Specimen #: [id number]   Collected: 3/15/2016   Received: 3/15/2016   Reported: 3/17/2016 13:33   Ordering Phy(s): PARVEEN ENRIQUEZ     ORIGINAL REPORT FOLLOWS ADDENDUM  ADDENDUM     TO ORIGINAL REPORT   Status: Signed Out   Date Ordered:3/18/2016   Date Complete:3/18/2016   Date Reported:3/18/2016 12:06   Signed Out By: Marianne Godfrey MD     INTERPRETATION:   This addendum is done to incorporate the results of fungal (GMS) stains   done on the specimen.  Specimen is negative for fungal organisms.  The   original final diagnosis remains unchanged.     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  Negative for malignancy(Please   see microscopic description)     Electronically signed out by:     Marianne Godfrey MD     CLINICAL HISTORY:   right orbital  "mass     GROSS:   The specimen is received labeled \"right orbital biopsy\" and consists of   red-tan nodule measuring 1.5 x 0.9 x 0.6 cm with one smooth side and   opposite rough side consistent with periosteum.  The specimen is   bisected revealing homogenous pale tan fleshy cut surface.  Touch   preparations are prepared, air dried and fixed, portion of specimen is   submitted in RPMI for possible lymphoma workup [facility],   ([facility], [place], [place] ).  The remainder is entirely submitted.   (Dictated by: Marianne Godfrey MD 3/15/2016 04:45 PM)     MICROSCOPIC:     Specimen consists of portion of lacrimal gland with acute and chronic   inflammation.  Focal area of microabscess formation associated with   small areas of necrosis are also present.  Inflammation is seen   extending to the periorbital adipose tissue forming panniculitis.   Specimen is negative for malignancy.  Samples sent for immunophenotyping   to [facility], ([facility], [place], [place] ) reveals no evidence   of monoclonality or aberrant antigen expression.  A GMS (fungal) stain   is pending and results will be reported as an addendum.     CPT Codes:   A: 90146-LI6, 50107-JCWZJ, SOH, 98220-HCAGF, 05528-EMUQ     TESTING LAB LOCATION:   [address]  [address] [phone]     COLLECTION SITE:   Client[facility]   [facility]            Component      Latest Ref Rng 4/29/2016   Nucleated RBCs      0 /100 0   Absolute Neutrophil      1.6 - 8.3 10e9/L 8.9 (H)   Absolute Lymphocytes      0.8 - 5.3 10e9/L 3.2   Absolute Monocytes      0.0 - 1.3 10e9/L 0.8   Absolute Eosinophils      0.0 - 0.7 10e9/L 0.2   Absolute Basophils      0.0 - 0.2 10e9/L 0.1   Abs Immature Granulocytes      0 - 0.4 10e9/L 0.1   Absolute Nucleated RBC       0.0   IGG      695 - 1620 mg/dL 836   IgG1      300 - 856 mg/dL 280 (L)   IgG2      158 - 761 mg/dL 277   IgG3      24 - 192 " mg/dL 35   IgG4      11 - 86 mg/dL 16   RNP Antibody IgG      0.0 - 0.9 AI <0.2 . . .   Smith SUNNY Antibody IgG      0.0 - 0.9 AI <0.2 . . .   SSA (Ro) (SUNNY) Antibody, IgG      0.0 - 0.9 AI <0.2 . . .   SSB (La) (SUNNY) Antibody, IgG      0.0 - 0.9 AI <0.2 . . .   Scleroderma Antibody Scl-70 SUNNY IgG      0.0 - 0.9 AI <0.2 . . .   Cholesterol      <200 mg/dL 154   Triglycerides      <150 mg/dL 220 (H)   HDL Cholesterol      >49 mg/dL 42 (L)   LDL Cholesterol Calculated      <100 mg/dL 67   Non HDL Cholesterol      <130 mg/dL 111   M Tuberculosis Result      NEG Negative   M Tuberculosis Antigen Value       0.00   Albumin      3.4 - 5.0 g/dL 4.3   ALT      0 - 50 U/L 30   AST      0 - 45 U/L 10   Complement C3      76 - 169 mg/dL 157   Complement C4      15 - 50 mg/dL 32   CRP Inflammation      0.0 - 8.0 mg/L <2.9   Sed Rate      0 - 20 mm/h 2   DNA-ds      <10 IU/mL 1   Cyclic Citrullinated Peptide Antibody, IgG      <7 U/mL 1   Rheumatoid Factor      <20 IU/mL <20   Proteinase 3 Antibody IgG      0.0 - 0.9 AI <0.2 . . .   Myeloperoxidase Antibody IgG      0.0 - 0.9 AI 2.5 (H)   Vitamin D Deficiency screening      20 - 75 ug/L 24   Hemoglobin A1C      4.3 - 6.0 % 7.9 (H)   ALLI by IFA IgG       1:40 (A) . . .     Component      Latest Ref Rng & Units 4/7/2017   WBC      4.0 - 11.0 10e9/L 11.3 (H)   RBC Count      3.8 - 5.2 10e12/L 4.77   Hemoglobin      11.7 - 15.7 g/dL 12.5   Hematocrit      35.0 - 47.0 % 38.7   MCV      78 - 100 fl 81   MCH      26.5 - 33.0 pg 26.2 (L)   MCHC      31.5 - 36.5 g/dL 32.3   RDW      10.0 - 15.0 % 13.2   Platelet Count      150 - 450 10e9/L 347   Diff Method       Automated Method   % Neutrophils      % 67.1   % Lymphocytes      % 22.9   % Monocytes      % 6.2   % Eosinophils      % 3.0   % Basophils      % 0.4   % Immature Granulocytes      % 0.4   Nucleated RBCs      0 /100 0   Absolute Neutrophil      1.6 - 8.3 10e9/L 7.5   Absolute Lymphocytes      0.8 - 5.3 10e9/L 2.6   Absolute  Monocytes      0.0 - 1.3 10e9/L 0.7   Absolute Eosinophils      0.0 - 0.7 10e9/L 0.3   Absolute Basophils      0.0 - 0.2 10e9/L 0.1   Abs Immature Granulocytes      0 - 0.4 10e9/L 0.1   Absolute Nucleated RBC       0.0   IGG      695 - 1620 mg/dL 962   IgG1      300 - 856 mg/dL Test sent to [facility]. See ARMISC.   IgG2      158 - 761 mg/dL Test sent to [facility]. See ARMISC.   IgG3      24 - 192 mg/dL Test sent to [facility]. See ARMISC.   IgG4      11 - 86 mg/dL Test sent to [facility]. See ARMISC.   Result       SEE NOTE . . .   Test Name       IGG SUBCLASSES   Send Outs Misc Test Code       35049   Send Outs Misc Test Specimen       Serum   Creatinine      0.52 - 1.04 mg/dL 1.20 (H)   GFR Estimate      >60 mL/min/1.7m2 47 (L)   GFR Estimate If Black      >60 mL/min/1.7m2 57 (L)   Creatinine Urine      mg/dL    Albumin Urine mg/L      mg/L    Albumin Urine mg/g Cr      0 - 25 mg/g Cr    Myeloperoxidase Antibody IgG      0.0 - 0.9 AI 2.9 (H)   Proteinase 3 Antibody IgG      0.0 - 0.9 AI <0.2 . . .   Neutrophil Cytoplasmic IgG Antibody       1:80 (A) . . .   Angiotensin Converting Enzyme       <5 (L) . . .   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      Comparison: MRI brain 5/29/2013      Technique:   Orbits: Axial and coronal T1-weighted, and coronal T2-weighted images  obtained without intravenous contrast. Post-intravenous contrast  (using gadolinium) sagittal FLAIR, were obtained with  fat-saturation,  focused on the orbits.  Brain: Axial susceptibility-weighted and FLAIR sequences were obtained  of the whole brain without intravenous contrast, and postcontrast  axial T1-weighted images were obtained through the whole brain.   Contrast: 7.5mL Gadavist     Findings:  New asymmetric enlargement of the right lacrimal gland and enhancement  of the right lacrimal gland with surrounding ill-defined crescentic T2  hyperintense signal and enhancement within the right superior  superotemporal orbit, predominantly involving the extraconal space  with slight intraconal extension. No definite associated restricted  diffusion. No discrete extraocular muscle enlargement. Normal  symmetric optic nerve signal. Cavernous sinuses and orbital apices are  normal. Globes are normal.     Whole brain images are symmetric minimal leukoaraiosis and generalized  parenchymal volume loss. Ventricles are not enlarged. No intracranial  hemorrhage, mass effect, midline shift or abnormal extra axial fluid  collection. No abnormal foci of intracranial enhancement or restricted  diffusion. Paranasal sinuses and mastoid air cells are clear.            Impression:    New abnormal enlargement of the right lacrimal gland with surrounding  ill-defined T2 hyperintense signal and enhancement centered in the  superotemporal right orbital fat, which may represent   infectious/inflammatory dacryadenitis, orbital pseudotumor, lymphoma,  carcinoma, sarcoidosis or IgG4 disease..     I have personally reviewed the examination and initial interpretation  and I agree with the findings.     PATRICIA BRANTLEY MD      Component      Latest Ref Rng & Units 6/1/2017   WBC      4.0 - 11.0 10e9/L 12.0 (H)   RBC Count      3.8 - 5.2 10e12/L 5.18   Hemoglobin      11.7 - 15.7 g/dL 13.8   Hematocrit      35.0 - 47.0 % 41.0   MCV      78 - 100 fl 79   MCH      26.5 - 33.0 pg 26.6   MCHC      31.5 - 36.5 g/dL 33.7   RDW      10.0 - 15.0 % 14.8   Platelet  Count      150 - 450 10e9/L 322   Diff Method       Automated Method   % Neutrophils      % 76.7   % Lymphocytes      % 16.5   % Monocytes      % 4.6   % Eosinophils      % 1.9   % Basophils      % 0.3   Absolute Neutrophil      1.6 - 8.3 10e9/L 9.2 (H)   Absolute Lymphocytes      0.8 - 5.3 10e9/L 2.0   Absolute Monocytes      0.0 - 1.3 10e9/L 0.6   Absolute Eosinophils      0.0 - 0.7 10e9/L 0.2   Absolute Basophils      0.0 - 0.2 10e9/L 0.0   Color Urine       Yellow   Appearance Urine       Clear   Glucose Urine      NEG mg/dL Negative   Bilirubin Urine      NEG Negative   Ketones Urine      NEG mg/dL Negative   Specific Gravity Urine      1.003 - 1.035 1.010   pH Urine      5.0 - 7.0 pH 5.5   Protein Albumin Urine      NEG mg/dL Negative   Urobilinogen Urine      0.2 - 1.0 EU/dL 0.2   Nitrite Urine      NEG Negative   Blood Urine      NEG Negative   Leukocyte Esterase Urine      NEG Negative   Source       Midstream Urine   WBC Urine      0 - 2 /HPF O - 2   RBC Urine      0 - 2 /HPF O - 2   Squamous Epithelial /LPF Urine      FEW /LPF Few   Bacteria Urine      NEG /HPF Few (A)   Creatinine      0.52 - 1.04 mg/dL 1.19 (H)   GFR Estimate      >60 mL/min/1.7m2 48 (L)   GFR Estimate If Black      >60 mL/min/1.7m2 58 (L)   Protein Random Urine      g/L <0.05   Protein Total Urine g/gr Creatinine      0 - 0.2 g/g Cr Unable to calculate due to low value   Myeloperoxidase Antibody IgG      0.0 - 0.9 AI 1.9 (H)   Proteinase 3 Antibody IgG      0.0 - 0.9 AI <0.2 . . .   ALT      0 - 50 U/L 29   AST      0 - 45 U/L 18   Albumin      3.4 - 5.0 g/dL 4.6   CRP Inflammation      0.0 - 8.0 mg/L 6.1   Sed Rate      0 - 30 mm/h 13   Creatinine Urine Random      mg/dL 54   Neutrophil Cytoplasmic IgG Antibody       <1:20 . . .   Creatinine Urine      mg/dL 54       Patient Name: FAVIO MARTINEZ   MR#: [id number]   Specimen #: [id number]   Collected: 8/4/2017   Received: 8/4/2017   Reported: 8/9/2017 16:19   Ordering Phy(s): ALI  "SANDRA     For improved result formatting, select 'View Enhanced Report Format'   under Linked Documents section.     SPECIMEN(S):   Eye, right lacrimal gland     FINAL DIAGNOSIS:   Eye, right, \"lacrimal gland\", biopsy   - Dense fibrosis and patchy inflammation   - No residual lacrimal gland seen     COMMENT:   Sections show tissue involved by dense fibrosis and patchy inflammation.   There is no morphologic or immunohistochemical evidence of lymphoma. By   separate report, concurrent flow cytometry studies ([id number]),   performed at the [facility], show polytypic B cells and no   aberrant immunophenotype on T cells. Immunohistochemical stains for IgG   and IgG-4 were performed, which provide no support for IgG-4-related   disease. Some of these changes may be related to prior surgery. Sjögren   disease is not excluded. There is no evidence of malignancy. Dr. Max Conway has reviewed this case and concurs.     Electronically signed out by:     Antonella Beth M.D.   INDICATION:  Right eye edema. Reported history of right orbital biopsy yielding pathology consistent with idiopathic inflammation.    TECHNIQUE:  Noncontrast CT images were obtained through the brain and facial bones.    COMPARISON:  CT sinus 03/27/2017, MRI brain and orbits 02/15/2016.     FINDINGS:  CT facial bones:   Large soft tissue lesion centered within the lateral intraconal and extraconal right orbit has significantly increased in size compared to the CT dated 03/27/2017. The lesion is inseparable from the right superior, lateral, and inferior rectus muscles, as well as the right lacrimal gland. The lesion demonstrates extension posteriorly slightly proximal to the orbital apex, as well as extension into the medial orbit superiorly and anteriorly. Mass effect includes medial bowing of the optic nerve sheath complex and pronounced proptosis of the right globe.  No mass is identified in the right orbit.  Torus palatinus. "   Minimal mucosal thickening in the ethmoid air cells. The remainder of the visualized paranasal sinuses are clear.  CT brain:  The ventricles and sulci within normal limits for patient age. No mass effect or midline shift. The gray-white differentiation is maintained.  No acute intracranial hemorrhage or pathologic extra-axial fluid collection.  The calvarium is intact. The mastoid air cells are clear.    IMPRESSION:  1. Large soft tissue lesion centered within the lateral intraconal and extraconal right orbit has significantly increased in size compared to the CT dated 03/27/2017. The lesion is inseparable from the right lacrimal gland and rectus musculature. Mass effect includes medial bowing of the optic nerve sheath complex and pronounced proptosis of the right globe. Given provided history, findings may represent a progressive inflammatory etiology (pseudotumor). Other differential considerations, such as sarcoidosis or lymphoma, could also have this appearance.  2. No acute intracranial hemorrhage or intracranial mass effect.    Please note that all CT scans at this facility use dose modulation, iterative reconstruction, and/or weight-based dosing when appropriate to reduce radiation dose to as low as reasonably achievable.    Dictated by Charles Ward MD @ Jul 16 2018  3:54PM    Signed by Dr. Charles Ward @ Jul 16 2018  4:20PM    CT ORBITAL WO CONTRAST 7/11/2019 3:42 PM     History: orbital pseudotumor; Subjective visual disturbance; Visual  field defect; Idiopathic orbital inflammatory syndrome  ICD-10: Subjective visual disturbance; Visual field defect; Idiopathic  orbital inflammatory syndrome     Comparison: CT 3/6/2019 and 7/16/2018, MRI brain 5/9/2017                                                                   Impression:   1. 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  2. Slightly decreased right orbital proptosis since 3/6/2019.  Component      Latest Ref Rng & Units 10/29/2019   WBC      4.0 - 11.0 10e9/L 11.6 (H)   RBC Count      3.8 - 5.2 10e12/L 4.73   Hemoglobin      11.7 - 15.7 g/dL 12.3   Hematocrit      35.0 - 47.0 % 39.1   MCV      78 - 100 fl 83   MCH      26.5 - 33.0 pg 26.0 (L)   MCHC      31.5 - 36.5 g/dL 31.5   RDW      10.0 - 15.0 % 13.8   Platelet Count      150 - 450 10e9/L 371   Diff Method       Automated Method   % Neutrophils      % 75.6   % Lymphocytes      % 14.8   % Monocytes      % 5.3   % Eosinophils      % 3.5   % Basophils      % 0.5   % Immature Granulocytes      % 0.3   Nucleated RBCs      0 /100 0   Absolute Neutrophil      1.6 - 8.3 10e9/L 8.8 (H)   Absolute Lymphocytes      0.8 - 5.3 10e9/L 1.7   Absolute Monocytes      0.0 - 1.3 10e9/L 0.6   Absolute Eosinophils      0.0 - 0.7 10e9/L 0.4   Absolute Basophils      0.0 - 0.2 10e9/L 0.1   Abs Immature Granulocytes      0 - 0.4 10e9/L 0.0   Absolute Nucleated RBC       0.0   Color Urine       Yellow   Appearance Urine       Clear   Glucose Urine      NEG:Negative mg/dL 50 (A)   Bilirubin Urine      NEG:Negative Negative   Ketones Urine      NEG:Negative mg/dL 5 (A)   Specific Gravity Urine      1.003 - 1.035 1.023   Blood Urine      NEG:Negative Negative   pH Urine      5.0 - 7.0 pH 5.0   Protein Albumin Urine      NEG:Negative mg/dL Negative   Urobilinogen mg/dL      0.0 - 2.0 mg/dL 0.0   Nitrite Urine      NEG:Negative Negative   Leukocyte Esterase Urine      NEG:Negative Negative   Source       Unspecified Urine   WBC Urine      0 - 5 /HPF <1   RBC Urine      0 - 2 /HPF 2   Mucous Urine      NEG:Negative /LPF Present (A)   Hyaline Casts      0 - 2 /LPF 5 (H)   IGG      695 - 1,620 mg/dL 682 (L)   IGA      70 - 380 mg/dL 238   IGM      60 - 265 mg/dL 46 (L)   Creatinine      0.52 - 1.04 mg/dL 1.04   GFR Estimate      >60  mL/min/1.73:m2 61   GFR Estimate If Black      >60 mL/min/1.73:m2 71   Neutrophil Cytoplasmic Antibody      <1:10 titer <1:10   Neutrophil Cytoplasmic Antibody Pattern       The ANCA IFA is <1:10.  No further testing will be performed.   CD19 B Cells      6 - 27 % <1 (L)   Absolute CD19      107 - 698 cells/uL <1 (L)   Protein Random Urine      g/L 0.26   Protein Total Urine g/gr Creatinine      0 - 0.2 g/g Cr 0.13   Myeloperoxidase Antibody IgG      0.0 - 0.9 AI 1.2 (H)   Proteinase 3 Antibody IgG      0.0 - 0.9 AI <0.2   Magnesium      1.6 - 2.3 mg/dL 1.7   Ferritin      8 - 252 ng/mL 67   Vitamin B12      193 - 986 pg/mL 592   Vitamin D Deficiency screening      20 - 75 ug/L 29   Sed Rate      0 - 30 mm/h 10   CRP Inflammation      0.0 - 8.0 mg/L <2.9   Albumin      3.4 - 5.0 g/dL 4.3   ALT      0 - 50 U/L 26   AST      0 - 45 U/L 17   Creatinine Urine      mg/dL 207     Component      Latest Ref Rng & Units 12/5/2019   WBC      4.0 - 11.0 10e9/L    RBC Count      3.8 - 5.2 10e12/L    Hemoglobin      11.7 - 15.7 g/dL    Hematocrit      35.0 - 47.0 %    MCV      78 - 100 fl    MCH      26.5 - 33.0 pg    MCHC      31.5 - 36.5 g/dL    RDW      10.0 - 15.0 %    Platelet Count      150 - 450 10e9/L    Diff Method          % Neutrophils      %    % Lymphocytes      %    % Monocytes      %    % Eosinophils      %    % Basophils      %    % Immature Granulocytes      %    Nucleated RBCs      0 /100    Absolute Neutrophil      1.6 - 8.3 10e9/L    Absolute Lymphocytes      0.8 - 5.3 10e9/L    Absolute Monocytes      0.0 - 1.3 10e9/L    Absolute Eosinophils      0.0 - 0.7 10e9/L    Absolute Basophils      0.0 - 0.2 10e9/L    Abs Immature Granulocytes      0 - 0.4 10e9/L    Absolute Nucleated RBC          Color Urine          Appearance Urine          Glucose Urine      NEG:Negative mg/dL    Bilirubin Urine      NEG:Negative    Ketones Urine      NEG:Negative mg/dL    Specific Gravity Urine      1.003 - 1.035    Blood  Urine      NEG:Negative    pH Urine      5.0 - 7.0 pH    Protein Albumin Urine      NEG:Negative mg/dL    Urobilinogen mg/dL      0.0 - 2.0 mg/dL    Nitrite Urine      NEG:Negative    Leukocyte Esterase Urine      NEG:Negative    Source          WBC Urine      0 - 5 /HPF    RBC Urine      0 - 2 /HPF    Mucous Urine      NEG:Negative /LPF    Hyaline Casts      0 - 2 /LPF    IGG      695 - 1,620 mg/dL 616 (L)   IGA      70 - 380 mg/dL 214   IGM      60 - 265 mg/dL 33 (L)   Creatinine      0.52 - 1.04 mg/dL    GFR Estimate      >60 mL/min/1.73:m2    GFR Estimate If Black      >60 mL/min/1.73:m2    Neutrophil Cytoplasmic Antibody      <1:10 titer    Neutrophil Cytoplasmic Antibody Pattern          CD19 B Cells      6 - 27 % <1   Absolute CD19      107 - 698 cells/uL <1   Protein Random Urine      g/L    Protein Total Urine g/gr Creatinine      0 - 0.2 g/g Cr    Myeloperoxidase Antibody IgG      0.0 - 0.9 AI    Proteinase 3 Antibody IgG      0.0 - 0.9 AI    Magnesium      1.6 - 2.3 mg/dL    Ferritin      8 - 252 ng/mL    Vitamin B12      193 - 986 pg/mL    Vitamin D Deficiency screening      20 - 75 ug/L    Sed Rate      0 - 30 mm/h    CRP Inflammation      0.0 - 8.0 mg/L    Albumin      3.4 - 5.0 g/dL    ALT      0 - 50 U/L    AST      0 - 45 U/L    Creatinine Urine      mg/dL      Component      Latest Ref Rng & Units 6/18/2020   WBC      4.0 - 11.0 10e9/L 11.1 (H)   RBC Count      3.8 - 5.2 10e12/L 4.42   Hemoglobin      11.7 - 15.7 g/dL 11.7   Hematocrit      35.0 - 47.0 % 35.9   MCV      78 - 100 fl 81   MCH      26.5 - 33.0 pg 26.5   MCHC      31.5 - 36.5 g/dL 32.6   RDW      10.0 - 15.0 % 13.2   Platelet Count      150 - 450 10e9/L 348   Diff Method       Automated Method   % Neutrophils      % 94.1   % Lymphocytes      % 4.5   % Monocytes      % 0.7   % Eosinophils      % 0.1   % Basophils      % 0.3   % Immature Granulocytes      % 0.3   Nucleated RBCs      0 /100 0   Absolute Neutrophil      1.6 - 8.3 10e9/L  10.5 (H)   Absolute Lymphocytes      0.8 - 5.3 10e9/L 0.5 (L)   Absolute Monocytes      0.0 - 1.3 10e9/L 0.1   Absolute Eosinophils      0.0 - 0.7 10e9/L 0.0   Absolute Basophils      0.0 - 0.2 10e9/L 0.0   Abs Immature Granulocytes      0 - 0.4 10e9/L 0.0   Absolute Nucleated RBC       0.0   Color Urine       Gwen   Appearance Urine       Slightly Cloudy   Glucose Urine      NEG:Negative mg/dL Negative   Bilirubin Urine      NEG:Negative Negative   Ketones Urine      NEG:Negative mg/dL 5 (A)   Specific Gravity Urine      1.003 - 1.035 1.030   Blood Urine      NEG:Negative Negative   pH Urine      5.0 - 7.0 pH 5.0   Protein Albumin Urine      NEG:Negative mg/dL 30 (A)   Urobilinogen mg/dL      0.0 - 2.0 mg/dL 0.0   Nitrite Urine      NEG:Negative Negative   Leukocyte Esterase Urine      NEG:Negative Negative   Source       Midstream Urine   WBC Urine      0 - 5 /HPF 3   RBC Urine      0 - 2 /HPF 3 (H)   Squamous Epithelial /HPF Urine      0 - 1 /HPF 1   Mucous Urine      NEG:Negative /LPF Present (A)   Hyaline Casts      0 - 2 /LPF 38 (H)   IGG      610 - 1,616 mg/dL 689   IGA      84 - 499 mg/dL 224   IGM      35 - 242 mg/dL 41   Creatinine      0.52 - 1.04 mg/dL 1.83 (H)   GFR Estimate      >60 mL/min/1.73:m2 31 (L)   GFR Estimate If Black      >60 mL/min/1.73:m2 36 (L)   Neutrophil Cytoplasmic Antibody      <1:10 titer <1:10   Neutrophil Cytoplasmic Antibody Pattern       The ANCA IFA is <1:10.  No further testing will be performed.   CD19 B Cells      6 - 27 % <1   Absolute CD19      107 - 698 cells/uL <1   Myeloperoxidase Antibody IgG      0.0 - 0.9 AI 1.2 (H)   Proteinase 3 Antibody IgG      0.0 - 0.9 AI <0.2   Protein Random Urine      g/L 0.73   Protein Total Urine g/gr Creatinine      0 - 0.2 g/g Cr 0.19   Vitamin D Deficiency screening      20 - 75 ug/L 34   Sed Rate      0 - 30 mm/h 18   CRP Inflammation      0.0 - 8.0 mg/L 4.8   Albumin      3.4 - 5.0 g/dL 3.6   AST      0 - 45 U/L 23   ALT      0 - 50  U/L 30   Creatinine Urine      mg/dL 385     ROS:  A comprehensive ROS was done, positives are per HPI.        HISTORY REVIEW:  Past Medical History:   Diagnosis Date     Abnormal glandular Papanicolaou smear of cervix 1992     Allergic rhinitis     Allergy, airborne subst     Arthritis      ASCVD (arteriosclerotic cardiovascular disease)      Chronic pain      Chronic pancreatitis (H)     idiopathic, Tx: PPI, antioxidants, pancreatic enzymes     Common migraine      Coronary artery disease      Costochondritis      Difficulty in walking(719.7)      Dyspnea on exertion      Ectasia, mammary duct     [facility], persistent nipple discharge     Elevated fasting glucose      Gastro-oesophageal reflux disease      Hernia      History of angina      Hyperlipidemia LDL goal < 100      Hypertension goal BP (blood pressure) < 140/90     Essential hypertension     Iron deficiency anemia      Ischemic cardiomyopathy      Menorrhagia      Migraine headaches      Mild persistent asthma      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 Matias, neg w/u, Rx'd w/prednisone     Ventral hernia, unspecified, without mention of obstruction or gangrene 7/2012     Past Surgical History:   Procedure Laterality Date     C ECHO HEART XTHORACIC,COMPLETE, W/O DOPPLER  2/4/04    [facility] WNL, EF 60%     C/SECTION, LOW TRANSVERSE           CARDIAC SURGERY      cardiac stent- recent in 16; 4 other stents     DILATION AND CURETTAGE N/A 2016    Procedure: DILATION AND CURETTAGE;  Surgeon: Nahed Butler MD;  Location: [facility]     HC UGI ENDOSCOPY W EUS  08    Dr. Pastrana, possible chronic pancreatitis, fatty liver     HERNIA REPAIR  2012    done at [facility] [place]     INSERT INTRAUTERINE DEVICE N/A 2016    Procedure: INSERT INTRAUTERINE DEVICE;  Surgeon: Nahed Butler MD;  Location: [facility]     INT UTERINE FIBRIOD EMBOLIZATION  10/29/2014     LAPAROSCOPIC CHOLECYSTECTOMY  08    Dr. Arnol GRUBBS TX, CERVICAL      s/p LEEP     ORBITOTOMY Right 3/15/2016    Procedure: ORBITOTOMY;  Surgeon: Myron Cyr MD;  [facility]     ORBITOTOMY Right 2017    Procedure: ORBITOTOMY;  RIGHT ORBITOTOMY AND BIOPSY;  Surgeon: Charis Holbrook MD;  [facility]     REPAIR PTOSIS Right 2017    Procedure: REPAIR PTOSIS;  RIGHT UPPER LID PTOSIS REPAIR;  Surgeon: Myron Cyr MD;  Location: [facility]     UPPER GI ENDOSCOPY  10/21/08    mild gastritis, Dr. Hidalgo     Family History   Problem Relation Age of Onset     Heart Disease Father 50        heart attack     Cerebrovascular Disease Father      Diabetes Father      Hypertension Father      Depression Father      C.A.D. Father      Hypertension Mother      Arthritis Mother      Heart Disease Mother         long qt syndrome     Thyroid Disease Mother      C.A.D. Mother      Heart Disease Brother 15        MI at 15, 16.      Diabetes Maternal Uncle      Hypertension Maternal Aunt      Hypertension Maternal Uncle      Arthritis Brother         he passed away, had severe arthritis at age 11     Arthritis Maternal Uncle      Eye Disorder Maternal Uncle         cataracts     Neurologic Disorder Sister         migraines     Neurologic Disorder Sister         migraines     Respiratory Son          asthma     Cerebrovascular Disease Maternal Uncle      C.A.D. Brother      Family History Negative Other         neg for RA, SLE     Unknown/Adopted No family hx of         unknown neurological issues in her family, mother was adopted     Skin Cancer No family hx of         No known family hx of skin cancer     Social History     Socioeconomic History     Marital status: Single     Spouse name: Not on file     Number of children: 1     Years of education: Not on file     Highest education level: Not on file   Occupational History     Employer: NONE    Social Needs     Financial resource strain: Not on file     Food insecurity     Worry: Not on file     Inability: Not on file     Transportation needs     Medical: Not on file     Non-medical: Not on file   Tobacco Use     Smoking status: Current Every Day Smoker     Packs/day: 0.20     Years: 1.00     Pack years: 0.20     Types: Cigarettes     Last attempt to quit: 2016     Years since quittin.4     Smokeless tobacco: Never Used   Substance and Sexual Activity     Alcohol use: No     Alcohol/week: 0.0 standard drinks     Drug use: No     Sexual activity: Not Currently   Lifestyle     Physical activity     Days per week: Not on file     Minutes per session: Not on file     Stress: Not on file   Relationships     Social connections     Talks on phone: Not on file     Gets together: Not on file     Attends Cheondoism service: Not on file     Active member of club or organization: Not on file     Attends meetings of clubs or organizations: Not on file     Relationship status: Not on file     Intimate partner violence     Fear of current or ex partner: Not on file     Emotionally abused: Not on file     Physically abused: Not on file     Forced sexual activity: Not on file   Other Topics Concern     Parent/sibling w/ CABG, MI or angioplasty before 65F 55M? Yes   Social History Narrative    Balanced Diet - sometimes    Osteoporosis Prevention Measures - Dairy  servings per day: 2 servings weekly    Regular Exercise -  Yes Describe walking 4 times a week    Dental Exam - NO    Seatbelts used - Yes    Self Breast Exam - Yes    Abuse: Current or Past (Physical, Sexual or Emotional)- No    Do you have any concerns about STD's -  No    Do you feel safe in your environment - No    Guns stored in the home - No    Sunscreen used - Yes    Lipids -  YES - Date: 053102    Glucose -  YES - Date: 012804    Eye Exam - YES - Date: one year ago    Colon Cancer Screening - No    Hemoccults - NO    Pap Test -  YES - Date: 070904, remote history of LEEP    Mammography - YES - Date: last spring, would like to discuss, [place]    DEXA - NO    Immunizations reviewed and up to date - Yes, last td given in 1997 or 1998     Patient Active Problem List   Diagnosis     Seasonal affective disorder (H)     Allergic rhinitis     PCOS (polycystic ovarian syndrome)     Moderate persistent asthma     Chronic pancreatitis (H)     Hypertension goal BP (blood pressure) < 140/90     Common migraine     NSTEMI (non-ST elevated myocardial infarction) (H)     Hyperlipidemia LDL goal <70     ASCVD (arteriosclerotic cardiovascular disease)     GERD (gastroesophageal reflux disease)     Ischemic cardiomyopathy     Hypertensive heart disease     Uterine leiomyoma     Iron deficiency anemia     Costochondritis     Vitamin D deficiency     Breast cancer screening     Spinal stenosis in cervical region     Fibromyalgia     Seronegative rheumatoid arthritis (H)     Type 2 diabetes, HbA1C goal < 8% (H)     Type 2 diabetes mellitus with other specified complication (H)     Hyperlipidemia associated with type 2 diabetes mellitus (H)     Hypertension associated with diabetes (H)     Overweight with body mass index (BMI) 25.0-29.9     Tobacco use disorder     Telogen effluvium     CAD S/P percutaneous coronary angioplasty     Status post coronary angiogram     ANCA-associated  vasculitis (H)     Wegener's vasculitis (H)     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 All misscarriages were in first trimester. H/o OC use in the past. 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    PMHx, FHx, SHx were reviewed, unchanged.      Outpatient Encounter Medications as of 2020   Medication Sig Dispense Refill     acetaminophen (TYLENOL) 325 MG tablet Take 1-2 tablets (325-650 mg) by mouth every 6 hours as needed for pain (headache) 250 tablet 0     albuterol (2.5 MG/3ML) 0.083% neb solution INL 1 VIAL VIA NEBULIZATION Q 4 TO 6 HOURS PRN  1     albuterol (PROAIR HFA, PROVENTIL HFA, VENTOLIN HFA) 108 (90 BASE) MCG/ACT inhaler Inhale 2 puffs into the lungs every 6 hours as needed for shortness of breath / dyspnea or wheezing 3 Inhaler 1     aspirin (ASPIRIN LOW DOSE) 81 MG EC tablet Take 1 tablet (81 mg) by mouth daily 30 tablet 11     blood glucose monitoring (NO BRAND SPECIFIED) meter device kit Use to test blood sugar 4 X times daily or as directed. (Patient taking differently: 1 kit Use to test blood sugar 4 X times daily or as directed.) 1 kit 0     blood glucose monitoring (NO BRAND SPECIFIED) test strip 1 strip by In Vitro route 4 times daily Test as directed. Please dispense three months and three months of refills. Thank you. (Patient taking differently: 1 strip by In Vitro route 4 times daily Test as directed. Please dispense three months and three months of refills. Thank you.) 360 each 3     Blood Pressure Monitor KIT 1 each daily Monitor home blood pressure as instructed by physician.  Dispense [place] blood pressure kit. 1 kit 0     calcium carbonate (TUMS) 500 MG chewable tablet Take 3-4 chew tab by mouth daily as needed.       clopidogrel (PLAVIX) 75 MG tablet Take 1 tablet (75 mg) by mouth daily 30 tablet 11     COMPRESSION STOCKINGS 2 each daily Apply one 10-15 mmHg compression stocking to each lower  extgmierty during the day and remove before bedtime. 4 each 2     cyclobenzaprine (FLEXERIL) 10 MG tablet Take 1 tablet (10 mg) by mouth 2 times daily as needed for muscle spasms 60 tablet 2     cycloSPORINE (RESTASIS) 0.05 % ophthalmic emulsion Place 1 drop into both eyes 2 times daily 60 each 11     cycloSPORINE (RESTASIS) 0.05 % ophthalmic emulsion Place 1 drop into the right eye every 12 hours       desonide (DESOWEN) 0.05 % cream Apply topically 2 times daily       diclofenac (VOLTAREN) 1 % topical gel Apply 2 g topically 4 times daily Apply to affected joints 100 g 3     dicyclomine (BENTYL) 20 MG tablet TAKE 1 TABLET(20 MG) BY MOUTH FOUR TIMES DAILY AS NEEDED. Pls schedule clinic appt for refills. 60 tablet 0     diphenhydrAMINE (BENADRYL) 25 MG capsule TK 1 TO 2 CS PO QHS  4     EPIPEN 2-RIKY 0.3 MG/0.3ML injection INJECT 0.3 MG INTO THE MUSCLE PRF ANAPHYALAXIS  0     ferrous gluconate (FERGON) 324 (38 Fe) MG tablet Take 1 tablet (324 mg) by mouth 2 times daily (with meals) DO NOT CRUSH. Absorbed best on an empty stomach. If stomach upset occurs, can take with meals. For additional refills, please schedule a follow-up appointment with Dr Solano at [phone] 180 tablet 0     fexofenadine (ALLEGRA) 180 MG tablet Take 1 tablet by mouth daily as needed. 90 tablet 3     fluocinolone (SYNALAR) 0.01 % solution Apply topically daily as needed       fluticasone-vilanterol (BREO ELLIPTA) 100-25 MCG/INH inhaler Inhale 1 puff into the lungs daily       folic acid (FOLVITE) 1 MG tablet Take 1 tablet by mouth daily (Patient not taking: Reported on 10/29/2019) 90 tablet 3     hydroxychloroquine (PLAQUENIL) 200 MG tablet Take 2 tablets (400 mg) by mouth daily (Annual eye exam/plaquenil screening) 180 tablet 0     insulin pen needle (BD MARCK U/F) 32G X 4 MM USE DAILY OR AS DIRECTED 300 each 3     ketoconazole (NIZORAL) 2 % shampoo Apply topically daily as needed       Ketoprofen POWD 1 g 2 times daily as needed (prn  pain) 500 g 3     lidocaine (LMX4) 4 % external cream Apply topically once as needed for mild pain (prn pain) 30 g 3     lifitegrast (XIIDRA) 5 % opthalmic solution Place 1 drop into both eyes 2 times daily 20 each 3     lisinopril-hydrochlorothiazide (PRINZIDE/ZESTORETIC) 20-25 MG tablet Take 1 tablet by mouth daily 30 tablet 11     Magnesium Oxide -Mg Supplement 250 MG TABS TK 1 T PO BID. REDUCE IF STOOLS LOOSEN  11     metFORMIN (GLUCOPHAGE-XR) 500 MG 24 hr tablet TAKE 2 TABLETS(1000 MG) BY MOUTH DAILY WITH DINNER (Patient taking differently: Take 2,000 mg by mouth daily ) 180 tablet 0     metoclopramide (REGLAN) 10 MG tablet Take 1 tablet (10 mg) by mouth 4 times daily (before meals and nightly) 90 tablet 0     metoprolol succinate ER (TOPROL-XL) 100 MG 24 hr tablet Take 1 tablet (100 mg) by mouth daily 30 tablet 11     metroNIDAZOLE (NORITATE) 1 % cream Apply topically daily       montelukast (SINGULAIR) 10 MG tablet Take 1 tablet (10 mg) by mouth At Bedtime 30 tablet 1     Multiple Vitamin (DAILY-GERALD) TABS Take 1 tablet by mouth daily  0     nitroGLYcerin (NITROSTAT) 0.4 MG sublingual tablet Place 1 tablet (0.4 mg) under the tongue every 5 minutes as needed for chest pain . After 3 doses, if pain persists call 911. 25 tablet 3     nystatin (MYCOSTATIN) 488139 UNITS TABS tablet TK 2 TS PO BID  0     ondansetron (ZOFRAN-ODT) 8 MG ODT tab Take 1 tablet (8 mg) by mouth every 8 hours as needed for nausea 60 tablet 1     pantoprazole (PROTONIX) 40 MG EC tablet Take 1 tablet (40 mg) by mouth daily Pork free tablets. (Patient taking differently: Take 40 mg by mouth as needed Pork free tablets.) 30 tablet 1     pravastatin (PRAVACHOL) 40 MG tablet Take 1 tablet (40 mg) by mouth daily 30 tablet 5     ranitidine (ZANTAC) 150 MG tablet Take 1 tablet (150 mg) by mouth 2 times daily (Patient not taking: Reported on 10/29/2019) 180 tablet 1     spironolactone (ALDACTONE) 25 MG tablet TAKE 1 TABLET BY MOUTH EVERY DAY TAKE  ADDITIONAL 1/2 TABLET BY MOUTH EVERY DAY AS NEEDED FOR WEIGHT GAIN 30 tablet 5     sucralfate (CARAFATE) 1 GM tablet Take 1 tablet (1 g) by mouth 4 times daily 360 tablet 0     traMADol (ULTRAM) 50 MG tablet Take 1 tablet (50 mg) by mouth every 8 hours as needed for moderate pain 60 tablet 0     Triamcinolone Acetonide (NASACORT ALLERGY 24HR NA)        vitamin D2 (ERGOCALCIFEROL) 61920 units (1250 mcg) capsule Take 1 capsule (50,000 Units) by mouth every 7 days 12 capsule 0     vitamin D3 (CHOLECALCIFEROL) 2000 units (50 mcg) tablet Take 1 tablet (2,000 Units) by mouth daily 90 tablet 1     No facility-administered encounter medications on file as of 7/1/2020.        Allergies   Allergen Reactions     Amoxicillin-Pot Clavulanate      Augmentin      Unknown possible hives and edema     Azithromycin      Diatrizoate Other (See Comments)     Pt wants this listed because she is allergic to shellfish      Imitrex [Sumatriptan]      Severe face/neck/chest tightness and flushing side effects      Penicillins Hives     Unknown      Pork Allergy      Stomach pain, cramping, diarrhea, itching, nausea and headaches     Shellfish Allergy Hives and Swelling     Sulfa Drugs Hives and Swelling     Zithromax [Azithromycin Dihydrate] Swelling     Unknown          Ph.E:    Not done, phone visit    Assessment/ plan:    1-Seropositive non-erosive RA (arthritis, AM stiffness, high CRP, RF 26 but re-check neg 3/2015 on HCQ) Dx 5/2013, FMS, new pleuritic CP 3/20-15-5/2015 resolved on steroids. GPA. She is on  mg bid since 5/2014. She tolerates it well and it's helping some but not enough to control all her pain. Continues having flare ups of joint pain, swelling also has FMS. Joint sx are getting worse. Had high ESR/CRP in 3/2015 and new pleuritic CP between 3/2015-5/2015 which resolved after taking medrol dose pack prescribed 5/20/2015. Her pleuritic CP could be related to her RA. Then stopped tramadol as it blames it for recent  episodes of CP.    In the past, MTX was recommended, she declined.    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 Etiology of mass unclear, but it's inflammatory and related to pt's underlying autoimmune disease (inflammatory arthritis, serositis). 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    Her work up showed borderline + ALLI and slightly elevated MPO. I told her prednisone is not good for her diabetes and weight gain. Recommended a trial of MTX as next step. Discussed risks on details. I called her neuro-ophthalmologist at last visit who agreed with trial of MTX (she suspects pseudo-sarcoidosis or sarcoid like disease but no granuloma and ACE not elevated).     Unfortunately despite all my efforts to explain risks vs benefits (more than risks) of MTX as steroid sparing gent, Janine declined to try MTX. I was very concerned about her R orbital inflammatory mass as there is high chance of flare up off steroids and steroid causes her weigh gain and uncontrolled diabetes. I even offered her other steroid sparing gents like imuran. She declined that as well.    Her inflammation around R orbit has recurred now. On 4/7/2017, I spent quite amount of time discussing a trial of MTX. After discussing risks/benefits, she agreed to try it.     She is s/p Tx with MTX 10 mg po qwk 4/2017-5/2017. No benefits and more GI SEs, more hair loss and headaches since start of MTX. No benefits. I called and spoke with her neuro-ophthalmologist who recommended a second opinion from neuro-ophthalmology at the . I suspect her presentation to be ANCA vasculitis related but wanted to repeat imaging and get neuro-ophthalmology eval here. She was recommended to have repeat biopsy to r/o lymphoma.    In 5/2017, prescribed  her prednisone 40-30-20-10 mg a day each for 3 days then stop (she declined higher dose and longer taper despite my concern for worsening swelling around orbit), Her R campos-orbital edema significantly improved. MTX was stopped in 5/2017 given side effects. Plan was to start imuran 50 mg po qd (NL TPMT); however we decided to hold off till she gets biopsy done.    Repeat R lacrimal gland biopsy in 8/2017 showed non specific inflammation, it ruled out lymphoma. Her R orbital swelling is improved but still present. After her biopsy I contacted her to schedule a f/u visit with me to discuss plan of care but she declined till today's visit.    She did not tolerate AZA because of GI SEs.    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     This is definitely an inflammatory process and is steroid responsive, she had local kenalog inj in 8/2017 at the time of biopsy which has helped. Given her diabetes and long term SE of prednisone, highly recommend steroid sparing agent to prevent progression/recurrence of R campos-orbital swelling. Since she did not tolerate MTX and AZA, recommend rituximab as next step.     In 2/2018, I spent 30 minutes discussing test results, plan of care, rituximab SEs including rare risk of PML. She declined rituximab with concern for SEs.    Unfortunately lost f/u sine 2/2018. In 9/2018, was back with her R eye being much worse, swelling around R orbit was severe and is pushing down her eye. She decided to see an ophthalmologist at [place], was seen 8/29, was told to have GPA.    Janine is frustrated with her eye condition, saying nobody told her that she has GPA or Wegener's which is a serious condition and people could die from it.    I reminded her that I discussed the Dx of  ANCA-vasculitis which is just another name for Wegener's with her many times but she always declined induction Tx rituximab at last visit in 9/2018. I put her on prednisone 30 mg every day in 9/2018, now she is off, stopped 6 wk after surgery. Does not like SEs including worsening BG.    CT chest/abdomen/pelvis 4/2017 was neg for malignancy. 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 Repeat MPO was positive in 6/2017.    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 Shah and Dr. Valdez at [place]. 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 Not happy with results, on my exam, her eye swelling/pain significantly improved. She wants to transfer care to here, I advised her to make an appointment with Dr. Saulo GREEN for f/u and referred her to Dr. Vasquez to assess if she has sinus involvement by GPA given facial pain.    After my original recommendation to receive rituximab (FDA approved for GPA) in 2/2018. She finally agreed to it, had 1 gr q2wk x 2 on 12/12/2018 and 12/26/2018. Had minor allergic reaction which was managed by extra dose of steroid and benadryl. She refuses to do prednisone taper given h/o diabetes, GIB on prednisone. I told her it would take 1 mo for rituximab to show benefit, if she continues to have active disease, recommend trial of cytoxan.     Patient received Rituxan again (6/3/19 & 6/17/19) 6 months after first round and last on 12/5/2019 and 12/19/2019. Repeat CT scan show continued inflammatory changes in the R orbit, but decreased proptosis. We discussed cytoxan again but she said it is out of question and she declined considering it. Therefore, we continued with q6mo rituximab with hope that inflammation goes down. Patient is not on any prednisone. 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 Per eye exam 1/2020, responding to rituximab, no longer has proptosis, will continue with rituximab. Last rituximab was 1 gr on 6/18/2020, next is due  on 7/2/2020.    Stable GPA. Stable labs.    Today's plan:    Return in late 9/2020 or early 10/2020 to discuss next dose of rituximab (very slow to avoid reaction)     mg bid with annual eye exam    Follow up with Dr. Vernon     Lidocaine cream and ketoprofen powder for knee pain      Continue accupuncture (referral was placed as it helps with chronic pain).     My impression is that her arthralgias are a combination of IA, fibromyalgia and chronic pain.  Stopped HCQ at last visit, shortly after resumed taking it as arthralgia recurred. Continue HCQ. Eye exam is updated.    2-Fad pads. She was seen by endocrinology and cushing was ruled out in 4/2014. Was advised to do f/u for enlarged thyroid/thyroid nodules.    3-Hair loss. F/U with dermatology    4-Chronic pain. F/U with pain clinic    5-Concerns of subclinical hyperthyroidism: TSH is barely minimal, chart review shows that those lowest levels are since April 2014. At last visit, discussed Endocrinology ref which pt wants to hold off in this visit.     6-Vit D deficiency. Was replaced with vit D 50, 000 units po qwk x 12 wk then 2000 units every day    7-Upper extremity swelling. Recent mammogram without suggestion of malignancy. No LAD of axillary region. Arms appear normal on physical exam, however patient reports intermittent swelling.  - Referral to lymphedema clinic was done in the past.      PLAN: Return in 3-4 months       MEDICATION CHANGES: as above          Phone visit: 15 min    Majo Mckinnon MD      Orders Placed This Encounter   Procedures     AST     ALT     Myeloperoxidase and Proteinase 3 Panel     Albumin level     CBC with platelets differential     Creatinine     CRP inflammation     UA with Microscopic reflex to Culture     Protein  random urine with Creat Ratio     Creatinine random urine     Erythrocyte sedimentation rate auto     CD19 B Cell Count     ANCA IgG by IFA with Reflex to Titer     Immunoglobulins A G and M

## 2020-07-01 NOTE — LETTER
7/1/2020       RE: Janine Cornell  [address] [id number]     Dear Colleague,    Thank you for referring your patient, Janine Cornell, to the [facility] RHEUMATOLOGY at [facility]. Please see a copy of my visit note below.    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 Cornell is a 51 yo AAF who was referred to our clinic for evaluation and management of her joint pain in setting of positive RF 26.    Her joint symptoms first started more than a year ago, but over last 6-8 months fluctuating some good and bad days . All her joints are involved. Over last 5 months, hands became swollen, warm and painful. Tylenol does not help. Ketoprofen did not help. Was told to avoid NSAIDs given ACS. Reports AM/PM stiffness x 2-3 hr, can't make full fist when wakes up in AM.    She feels tired all the time. Reports worsening hair loss and unchanged  facial rash. Gets red sore flaky rash over cheeks across her face which is intermittent diagnosed Rosacea and is prescribed metronidazole gel which she has not started using. Reports occasional oral ulcers. Hands feel cold with red discoloration last winter. Has occasional dysphagia to both solids and liquid. Has dry eyes, is using OTC allergy eyedrops. Has chronic abdominal pain. Has h/o pancreatitis. Sometime has anxiety. Occasionally has numbness, tingling in fingers/toes. Has back and spine issues, her whole back and neck hurts, different areas at different time. She is wondering if she has FMS. Has hard time sleeping, has never been diagnosed with BRENNA. She does not know if he snores. She was found to have slightly positive RF in 2/2013. Has microcytic anemia.     Her arthralgia  gets worse with drop in temperature. Reports body ache. Activity makes her pain worse. Her joint swelling isintermittent. Her body pain and joint pain is the same. Reports AM stiffness x 3 hr.    She has been doing acupuncture x few months and it is helping with her pain. She thinks that the combination of plaquenil and acupuncture is helpful but overall she notice 30% in her symptoms relief.     Takes tylenol and tramadol on occasion for pain.    She decided to use different formulation of metformin which helped with her abdominal pain. I referred her to GI for evaluation of elevated lipase and abdominal pain. She decided to see GI in the future.     She is on  mg qd since 5/2014, tolerates it well and it helps her some. Denies taking any gabapentin due to chest pain and headches. She has had referral her for water aerobics, but she can't begin until she's healed from recent surgery in 10/2014. She thinks HCQ is helping but not enough to control all her pain, reports 2-3 hr of AM stiffness with ongoing diffuse arthralgia/myalgia along with intermittent swelling of hands. She does see her acupuncture person and it helps with her pain, has not started water aerobics yet. Has got her flu shot.     10/2015: Reports having pleuritic CP in 3/2015, was prescribed Lidoderm patches first. It did not help. Took prednisone (?dose) by her own, pain got better but it recurred off prednisone and gradually got worse to the point that she could not stand the pain anymore, was seen in ER in 5/2015, was treated with medrol dose pack which helped. Reports pain over hips, knees, ankles and fingers. Pain gets worse with walking. Reports myalgia, swelling of the arms. Pain over neck, shoulders. Has AM stiffness x 3-4 hr. Fingers swell up and become painful. Can't remember if steroid helped with joint pain. She had headaches, severe CP when she took tramadol and gabapentin and stopped taking them, sx resolved but she can't tell  which one caused SEs but thinks that probably it was gabapentin. She has good days and tries to be more active but activity aggravates the pain. Headaches are intermittent. HCQ helps some not enough to control all the pain. No HCQ SEs. Had eye exam around July 2015. Flexeril helps but PCP wants to change flexeril to zanaflex, reporting that she is NOT comfortable with the change. Acupuncture still helps an she continued to  do that. Concerned about fat pads she has in supraclavicular area, has h/o thyroid nodules. Continues having hair loss, takes iron for iron deficiency.     2/2016: She has 2 major complaints today, increased joint pain/swelling in hands/feet and recent Dx of optic neuritis in R eye, reports having similar problem about 20 yr ago, now recurred. Has a spot in R eye vision x long term, was seen by ophthalmology few days ago and was told to have optic neuritis. Gets joint pain, muscle pain. Can't  objects, hands are swollen. Knees, hips and ankles are painful. Reports more arthralgia. AM stiffness/ pain is 3-4 hr. She wants me to talk to her ophthalmologist directly.    She had botox inj for migraines which did not help her. She is going to have angiogram next wk, had to take more nitro for CP recently.     4/29/2016: She reports being on steroid taper x 3 since last visit. Reportedly, had orbit MRI, it showed swelling/inflamamtion of R lacrimal glands. She had painful swelling of her R eye, cause her headaches. Botox inj made her headaches worse. She is being dealing with this since 1/2016, with severe headaches and pain/swelling around R eye. Had biopsy in 3/2016. She is frustrated as prednisone causes her weight gain and her BG is difficult to control on it.    5/2016: At last visit, was prescribed MTX 10 mg po qwk to take along with FA 1 mg qd. She decided not to take MTX, is afraid of side effects, believes all these medications would harm her. She also believes that botox caused her  inflammation around R eye. No major flare up of per-orbital inflamamtion since last visit but continues to have swelling around her R eye.    11/2016: Reports diffuse body ache, arthralgia, myalgia especially with weather change. No major flare up of campos-orbital inflammation but her face/around R eye swells up from time to time. Reports 2 episodes of CP over past 2 mo, nitro sometimes helps and sometimes does not help. She has asthma and costochondritis and she has hard time to distinguish the origin of pain. Sometimes knees and fingers swell up. Her stiffness is sometimes all day.    4/2017:  Reports having sinusitis since last visit. Her R eye is dry and is using eyedrops to keep it moist. Reports having pain in ears. Was treated with antibiotics by PCP, it got better then it got worse. Reports catching infections easily. Then started having pressure over eyes with pain/headaches (exact start date is unknown). Pain gradually got worse and became persistent. Had sinus CT.     4/7/2017: Having a flare up of swelling, pain around her R orbit. Has not tried MTX yet.    5/2017: On MTX 10 mg po qwk since 4/7/2017, reports increased stomach pain and nausea and new headaches since start of MTX and it's not helping. Pain/swelling around R orbit is worse.    6/2017: She finished prednisone taper prescribed at last visit, R campos-orbital swelling significantly improved. 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    2/2018: R campos-orbital swelling has improved. Repeat R lacrimal gland biopsy in 8/2017 showed non specific inflammation with no lymphoma. She is off steroid. Did not tolerate AZA due to N/V and abdominal pain.    Is back with recurrence of R campos-orbital sweling x past 2 months. Pain really got worse over past 3wk, requries     9/2018: Declined ritiximab at last  visit, lost f/u since 2/2018. Went to [facility] and was seen on 8/29 by Dr. 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 Reportedly he ordered labs and recommended surgery since the inflammation of the R eye is back and is pushing the eye down. Janine took some prednsione 30 mg every day few days a go for pain.    3/2019: She had lateral orbitotomy and debulking orbital mass right eye on 9/26/18 with Dr. Shah and Dr. Valdez at [facility]. 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 There were no complications according to the op note.    Janine finally agreed to receive rituximab for her GPA. She had it as 1 gr q2wk x 2 on 12/12/2018 and 12/26/2018. Had minor allergic reaction which was managed by IV solu cortef and benadryl. She is off prednisone about 6wk after surgery. Had bleeding ulcer from prednsione. Has pain over sinus, ears. Still has residual pain, swelling around her R eye is concerned about it. Wants to transfer her care to ophthalmology here as it's closer to her.    Cold induced sweating congestion joint pain  Facial swelling  allegy doctor clear ear pft ok doxy sinusitis     (7/12/19):  Patient completed her 2 rounds of rituximab in June. She tolerated well. She was recently seen by Dr. Vernon in [facility] and repeat CT scan of orbits shows some decrease in proptosis. She reports that her R eye still intermittently feels puffy. She also mentions intermittent swelling in her arms. Unclear of any triggers. Limbs are also heavy with muscle aches and some joint pains. Exacerbated with activity.    10/29/2019:    Not feeling well with headaches, diffuse arthralgia and myalgia being worse over past 2 months. Headaches wax and wane and never go away. Gets swelling around her R eyes with numbness/tingling of R cheek.    Gets hives randomly, over trunk, extremities.    Has shortness of breath on walking fast. Gets cough from asthma, nothing new. No hemoptysis. Fingers start to  hurt and itch with drop in T.       2/21/2020:    Not feeling well. Reports SOB, pleuritic CP, dry cough since early Feb. Feels retaining fluid. Gets headaches. Her cardiologist recommended cardiac cath. Tylenol is not helping. T was 101 last wk, not today, took tylenol to drop tylenol and used ice pack on her head and had lots of water and tea, has poor appetite. Feeling better but cough is still there, worse in AM and night. First had blood in sputum but does not have it anymore. Has diffuse arthralgia, myalgia, worse x past 3 months due to cold weather. Fatigue is worse. No pleuritic CP today. No fevers today but feels short of breath a lot (became chronic), worse with activity.    No rash.    Her eye swelling/pseudotumor is quiet, still has residual numbness from surgery, gets headaches.    Saw Dr. Vernon in 1/2020, Dr. Vernon thought that pseudotumor responded to rituximab.      Last rituximab was in 12/5/2019 and 12/19/2019.    Today 2/21/2020:    Overall stable. No flare up. No change since in symptoms last visit. Last rituximab was 1 gr on 6/18/2020, next scheduled for 7/2/2020.    Component      Latest Ref Rng 2/28/2013 2/28/2013          12:14 PM 12:14 PM   WBC      4.0 - 11.0 10e9/L     RBC Count      3.8 - 5.2 10e12/L     Hemoglobin      11.7 - 15.7 g/dL     Hematocrit      35.0 - 47.0 %     MCV      78 - 100 fl     MCH      26.5 - 33.0 pg     MCHC      31.5 - 36.5 g/dL     RDW      10.0 - 15.0 %     Platelet Count      150 - 450 10e9/L     Specimen Description           Lyme Screen IgG and IgM           Vitamin D Deficiency screening      30 - 75 ug/L     Ferritin      10 - 300 ng/mL     Sed Rate      0 - 20 mm/h     ALLI Screen by EIA      <1.0     Rheumatoid Factor      0 - 14 IU/mL     CK Total      30 - 225 U/L  78   Uric Acid      2.5 - 7.5 mg/dL 6.7      Component      Latest Ref Rng 2/28/2013          12:14 PM   WBC      4.0 - 11.0 10e9/L    RBC Count      3.8 - 5.2 10e12/L    Hemoglobin      11.7 -  15.7 g/dL    Hematocrit      35.0 - 47.0 %    MCV      78 - 100 fl    MCH      26.5 - 33.0 pg    MCHC      31.5 - 36.5 g/dL    RDW      10.0 - 15.0 %    Platelet Count      150 - 450 10e9/L    Specimen Description       Serum   Lyme Screen IgG and IgM       Test value: <0.75....Interpretation: Negative....If you highly suspect Lyme . . .   Vitamin D Deficiency screening      30 - 75 ug/L    Ferritin      10 - 300 ng/mL    Sed Rate      0 - 20 mm/h    ALLI Screen by EIA      <1.0    Rheumatoid Factor      0 - 14 IU/mL    CK Total      30 - 225 U/L    Uric Acid      2.5 - 7.5 mg/dL      Component      Latest Ref Rng 2/28/2013 2/28/2013 2/28/2013 2/28/2013          12:14 PM 12:14 PM 12:14 PM 12:14 PM   WBC      4.0 - 11.0 10e9/L       RBC Count      3.8 - 5.2 10e12/L       Hemoglobin      11.7 - 15.7 g/dL       Hematocrit      35.0 - 47.0 %       MCV      78 - 100 fl       MCH      26.5 - 33.0 pg       MCHC      31.5 - 36.5 g/dL       RDW      10.0 - 15.0 %       Platelet Count      150 - 450 10e9/L       Specimen Description             Lyme Screen IgG and IgM             Vitamin D Deficiency screening      30 - 75 ug/L       Ferritin      10 - 300 ng/mL    10   Sed Rate      0 - 20 mm/h   23 (H)    ALLI Screen by EIA      <1.0  <1.0 . . .     Rheumatoid Factor      0 - 14 IU/mL 26 (H)      CK Total      30 - 225 U/L       Uric Acid      2.5 - 7.5 mg/dL         Component      Latest Ref Rng 2/28/2013 2/28/2013          12:14 PM 12:14 PM   WBC      4.0 - 11.0 10e9/L 14.1 (H)    RBC Count      3.8 - 5.2 10e12/L 4.55    Hemoglobin      11.7 - 15.7 g/dL 10.7 (L)    Hematocrit      35.0 - 47.0 % 33.3 (L)    MCV      78 - 100 fl 73 (L)    MCH      26.5 - 33.0 pg 23.5 (L)    MCHC      31.5 - 36.5 g/dL 32.1    RDW      10.0 - 15.0 % 18.1 (H)    Platelet Count      150 - 450 10e9/L 407    Specimen Description           Lyme Screen IgG and IgM           Vitamin D Deficiency screening      30 - 75 ug/L  32   Ferritin      10 - 300  ng/mL     Sed Rate      0 - 20 mm/h     ALLI Screen by EIA      <1.0     Rheumatoid Factor      0 - 14 IU/mL     CK Total      30 - 225 U/L     Uric Acid      2.5 - 7.5 mg/dL          MRI CERVICAL SPINE WITHOUT CONTRAST 4/3/2013 12:47 PM    HISTORY: Cervicalgia. Degeneration of cervical intervertebral disc.  MRI cervical spine. Evaluate bilateral supraclavicular lymph nodes.  Clinical enlargement.    TECHNIQUE: Multiplanar multisequence MRI of the cervical spine  without gadolinium contrast.    COMPARISON: None.    FINDINGS: The patient has a developmentally small central canal.  Images of the cervical cord reveals small areas of T2 hyperintensity  within the cervical cord. There is a small area of high signal  intensity at the C1 level. There is also a small area of high signal  intensity at the C6-C7 level. The area of high signal at C6-C7 is  located at an area of central spinal stenosis. This may be due to  myelomalacia. The area of high signal at C1-C2 is indeterminate.    C2-C3: Normal disc, facet joints, spinal canal and neural foramina.    C3-C4: Normal disc, facet joints, spinal canal and neural foramina.    C4-C5: Normal disc, facet joints, spinal canal and neural foramina.    C5-C6: There is degeneration of the disc. There is a mild annular  disc bulge. The central canal is mild-moderately narrowed. The  residual AP diameter of the central spinal canal measures  approximately 9 mm.    C6-C7: There is degeneration of the disc. There is loss of disc space  height. There is a diffuse annular disc bulge. There are associated  posterior osteophytes. There are severe central spinal stenosis at  this level. The residual AP diameter of the central spinal canal is  only about 6 mm. There is significant flattening of the cord. There  is high signal in the cord at this level consistent with  myelomalacia. There is moderate to severe right foraminal stenosis  due to and uncinate spur.    C7-T1: Normal disc, facet  joints, spinal canal and neural foramina.            Result Impression       IMPRESSION:  1. Severe central spinal stenosis at C6-C7 due to a developmentally  small canal and due to a bulging disc and associated posterior  osteophyte. There is flattening of the cord at this level and high  signal in the cord at this level consistent with myelomalacia.  2. There is a small, indeterminate 2-3 mm area of high signal  intensity in the cord at the C1 level. This could be due to gliosis  secondary to a previous infectious or inflammatory process.  Demyelinating disease could also have a similar appearance. Clinical  correlation suggested.    GAIL MORE MD     MRI LEFT UPPER EXTREMITY NON-JOINT WITHOUT CONTRAST, MRI RIGHT UPPER  EXTREMITY NON-JOINT WITHOUT CONTRAST April 3, 2013 1:32 PM    HISTORY: Cervicalgia. Degeneration of cervical intervertebral disc.    TECHNIQUE: Multiplanar, multisequence imaging of the brachial plexus  was performed without gadolinium contrast enhancement.    COMPARISON: None.    FINDINGS: No mass lesions are seen. No inflammatory process is  identified. The roots, trunks, branches, and divisions of both the  right and left brachial plexus appear normal. No adenopathy is seen.  The anterior scalene muscles and the middle scalene muscles appear  normal. Nodules are seen within the thyroid gland. The largest nodule  is seen in the left lobe of the thyroid. This measures about 1.8 cm  in diameter.            Result Impression       IMPRESSION:  1. Both the right and left brachial plexus regions appear normal.  2. Thyroid nodules. The largest nodule is in the left lobe of the  thyroid. It measures about 1.8 cm in diameter.       XR WRIST BILATERAL G/E 3 VIEWS    Narrative:        EXAMINATION:  1. Each hand 3 views  2. Each wrist 3 views     DATE: 5/1/2013     HISTORY: Arthropathy with concern for rheumatoid.     FINDINGS: 3 views of each hand and 3 views of each wrist are obtained  without prior for  comparison. Alignment is normal. There is no  fracture or acute bone abnormality. No distinct erosions are seen.  Some spurring of the distal radial ulnar joint is noted on the right.       Impression:     IMPRESSION:  1. No evidence of an inflammatory arthropathy involving either hand  or wrist.     VIELKA GUEVARA MD         XR FOOT BILATERAL G/E 3 VIEWS    Narrative:        EXAMINATION:  1. Each foot 3 views  2. Each ankle 3 views  3. Sacroiliac joint series     DATE: 5/1/2013     HISTORY: Arthropathy; concern for rheumatoid.     FINDINGS: No prior for comparison.     A frontal and bilateral oblique evaluation of the sacroiliac joints  shows no malalignment. Some patchy sclerosis is identified along the  central aspect of both sacroiliac joints, which is favored to be  degenerative. No distinct erosions are seen. The visualized portion  of the hip joint spaces appear maintained, with no erosive changes  identified. Mild endplate spurring is noted in the lower lumbar  spine.     3 views of each foot and 3 views of each ankle show no evidence of  acute fracture or dislocation. The metatarsal phalangeal,  tarsometatarsal and intertarsal joint spaces appear maintained. No  erosions are identified. There is no sign of acroosteolysis. The  ankle mortise and talar dome are intact bilaterally. Minimal spurring  is noted along the tip of the medial malleolus bilaterally.       Impression:     IMPRESSION:  1. Patchy sclerosis identified along the central aspect of both  sacroiliac joints, which is favored to be degenerative. No distinct  erosions are seen.  2. No evidence of an inflammatory arthropathy in either foot or ankle.     VIELKA GUEVARA MD     5/2013  CBC WITH PLATELETS DIFFERENTIAL       Component Value Range    WBC 11.3 (*) 4.0 - 11.0 10e9/L    RBC Count 4.56  3.8 - 5.2 10e12/L    Hemoglobin 11.1 (*) 11.7 - 15.7 g/dL    Hematocrit 34.3 (*) 35.0 - 47.0 %    MCV 75 (*) 78 - 100 fl    MCH 24.3 (*) 26.5 - 33.0 pg    MCHC  32.4  31.5 - 36.5 g/dL    RDW 16.1 (*) 10.0 - 15.0 %    Platelet Count 315  150 - 450 10e9/L    Diff Method Automated Method      % Neutrophils 71.6  40 - 75 %    % Lymphocytes 20.9  20 - 48 %    % Monocytes 4.3  0 - 12 %    % Eosinophils 2.8  0 - 6 %    % Basophils 0.2  0 - 2 %    % Immature Granulocytes 0.2  0 - 0.4 %    Absolute Neutrophil 8.1  1.6 - 8.3 10e9/L    Absolute Lymphocytes 2.4  0.8 - 5.3 10e9/L    Absolute Monoctyes 0.5  0.0 - 1.3 10e9/L    Absolute Eosinophils 0.3  0.0 - 0.7 10e9/L    Absolute Basophils 0.0  0.0 - 0.2 10e9/L    Abs Immature Granulocytes 0.0  0 - 0.03 10e9/L   AMYLASE       Component Value Range    Amylase 103  30 - 110 U/L   COMPREHENSIVE METABOLIC PANEL       Component Value Range    Sodium 144  133 - 144 mmol/L    Potassium 3.8  3.4 - 5.3 mmol/L    Chloride 105  94 - 109 mmol/L    Carbon Dioxide 23  20 - 32 mmol/L    Anion Gap 17  6 - 17 mmol/L    Glucose 173 (*) 60 - 99 mg/dL    Urea Nitrogen 13  5 - 24 mg/dL    Creatinine 0.83  0.52 - 1.04 mg/dL    GFR Estimate 74  >60 mL/min/1.7m2    GFR Estimate If Black 90  >60 mL/min/1.7m2    Calcium 9.7  8.5 - 10.4 mg/dL    Bilirubin Total 0.4  0.2 - 1.3 mg/dL    Albumin 4.3  3.9 - 5.1 g/dL    Protein Total 7.8  6.8 - 8.8 g/dL    Alkaline Phosphatase 66  40 - 150 U/L    ALT 36  0 - 50 U/L    AST 28  0 - 45 U/L   CK TOTAL       Component Value Range    CK Total 66  30 - 225 U/L   CRP INFLAMMATION       Component Value Range    CRP Inflammation 10.4 (*) 0.0 - 8.0 mg/L   LIPASE       Component Value Range    Lipase 353 (*) 20 - 250 U/L   ERYTHROCYTE SEDIMENTATION RATE AUTO       Component Value Range    Sed Rate 26 (*) 0 - 20 mm/h   ROUTINE UA WITH MICROSCOPIC REFLEX TO CULTURE       Component Value Range    Color Urine Yellow      Appearance Urine Slightly Cloudy      Glucose Urine 30 (*) NEG mg/dL    Bilirubin Urine Negative  NEG    Ketones Urine 5 (*) NEG mg/dL    Specific Gravity Urine 1.026  1.003 - 1.035    Blood Urine Trace (*) NEG    pH  Urine 5.0  5.0 - 7.0 pH    Protein Albumin Urine 10 (*) NEG mg/dL    Urobilinogen mg/dL Normal  0.0 - 2.0 mg/dL    Nitrite Urine Negative  NEG    Leukocyte Esterase Urine Negative  NEG    Source Midstream Urine      WBC Urine 1  0 - 2 /HPF    RBC Urine 4 (*) 0 - 2 /HPF    Squamous Epithelial /HPF Urine <1  0 - 1 /HPF    Mucous Urine Present (*) NEG /LPF    Hyaline Casts 3 (*) 0 - 2 /LPF    Calcium Oxalate Moderate (*) NEG /HPF   COMPLEMENT C3       Component Value Range    Complement C3 143  76 - 169 mg/dL   COMPLEMENT C4       Component Value Range    Complement C4 31  15 - 50 mg/dL   HEPATITIS C ANTIBODY       Component Value Range    Hepatitis C Antibody Negative  NEG   CARDIOLIPIN ANTIBODY IGG AND IGM       Component Value Range    Cardiolipin IgG Marline    0 - 15.0 GPL    Value: <15.0      Interpretation:  Negative    Cardiolipin IgM Marline    0 - 12.5 MPL    Value: <12.5      Interpretation:  Negative   LUPUS PANEL       Component Value Range    Lupus Result    NEG    Value: Negative      (Note)      COMMENTS:      The INR is normal.      APTT is normal.  1:2 Mix is not indicated.      DRVVT Screen is normal.      Thrombin time is normal.      NEGATIVE TEST; A LUPUS ANTICOAGULANT WAS NOT DETECTED IN THIS      SPECIMEN WITHIN THE LIMITS OF THE TESTING REPERTOIRE.      If the clinical picture is strongly suggestive of an antiphospholipid      syndrome, recommend anticardiolipin and beta-2-glycoprotein (IgG and      IgM) antibody tests.      Leela Franks M.D.  [phone]      5/2/2013            INR =  0.93 N = 0.86-1.14  (No additional charge)      TT = 15.7 N = 13.0-19.0 sec  (No additional charge)            APTT'S:    Seconds      Reagent =  Stago LA      Normal  =  38      Patient  =  35      1:2 Mix  =  N/A      Reference =  31-43             DILUTE MADELINE VIPER VENOM TEST:      DRVVT Screen Ratio = 0.76 Normal = <1.21         IMMUNOGLOBULIN G SUBCLASSES       Component Value Range    IGG 1030  695 -  1620 mg/dL    IgG1 488  300 - 856 mg/dL    IgG2 325  158 - 761 mg/dL    IgG3 47  24 - 192 mg/dL    IgG4 18  11 - 86 mg/dL   SUNNY ANTIBODY PANEL       Component Value Range    Ribonucleic Protein IgG Antibody 0      Smith Antibody IgG 1      SSA (RO) Antibody IgG 4      SSB (LA) Antibody IgG 0      Scleroderma Antibody IgG 0     BETA 2 GLYCOPROTEIN ANTIBODIES IGG IGM       Component Value Range    Beta-2-Glycopro IgG 1      Beta-2-Glycopro IgM 5     CYCLIC CIT PEPT IGG       Component Value Range    Cyclic Cit Pept IgG/IgA    <20 UNITS    Value: <20      Interpretation:  Negative   DNA DOUBLE STRANDED ANTIBODIES       Component Value Range    DNA-ds    0 - 29 IU/mL    Value: <15      Interpretation:  Negative       CT CHEST PULMONARY EMBOLISM W CONTRAST 5/20/2015 4:57 PM  HISTORY: Pain. SOB. Elevated d-dimer.   TECHNIQUE: 65 mL Isovue 370. Axial images with coronal  reconstructions.  COMPARISON: None.  FINDINGS: Calcified granuloma with surrounding scarring in the  posterolateral left upper lobe. Triangular shaped opacity at the right  lung base in the lateral right middle lobe could represent some  scarring, atelectasis or infiltrate. There is also some scarring or  atelectasis in the posteromedial right lung base. Lungs otherwise  clear.  The pulmonary arteries are well opacified. No CT evidence for acute  pulmonary embolus. No aortic dissection.  Diffuse fatty infiltration of the liver.  IMPRESSION  IMPRESSION:   1. No pulmonary embolus identified.  2. Small focus of atelectasis, infiltrate or scarring in the lateral  right middle lobe.  3. Old granulomatous disease.  4. Otherwise negative.  SILVERIO VAZQUEZ MD    Results for FAVIO MARTINEZ (MRN [id number]) as of 10/30/2015 17:00   Ref. Range 8/21/2012 09:06 5/22/2013 14:22 4/14/2014 12:06 9/11/2014 12:35 12/4/2014 16:38   TSH Latest Range: 0.40-4.00 mU/L 0.83 0.43 0.27 (L) 0.23 (L) 0.22 (L)     Component      Latest Ref Rng 10/30/2015   WBC      4.0 - 11.0 10e9/L  13.4 (H)   RBC Count      3.8 - 5.2 10e12/L 4.76   Hemoglobin      11.7 - 15.7 g/dL 12.4   Hematocrit      35.0 - 47.0 % 37.9   MCV      78 - 100 fl 80   MCH      26.5 - 33.0 pg 26.1 (L)   MCHC      31.5 - 36.5 g/dL 32.7   RDW      10.0 - 15.0 % 14.5   Platelet Count      150 - 450 10e9/L 324   Diff Method       Automated Method   % Neutrophils       67.7   % Lymphocytes       22.7   % Monocytes       6.3   % Eosinophils       2.7   % Basophils       0.4   % Immature Granulocytes       0.2   Absolute Neutrophil      1.6 - 8.3 10e9/L 9.1 (H)   Absolute Lymphocytes      0.8 - 5.3 10e9/L 3.1   Absolute Monoctyes      0.0 - 1.3 10e9/L 0.9   Absolute Eosinophils      0.0 - 0.7 10e9/L 0.4   Absolute Basophils      0.0 - 0.2 10e9/L 0.1   Abs Immature Granulocytes      0 - 0.4 10e9/L 0.0   Creatinine      0.52 - 1.04 mg/dL 1.21 (H)   GFR Estimate      >60 mL/min/1.7m2 47 (L)   GFR Estimate If Black      >60 mL/min/1.7m2 57 (L)   Iron      35 - 180 ug/dL 70   Iron Binding Cap      240 - 430 ug/dL 428   Iron Saturation Index      15 - 46 % 16   Albumin      3.4 - 5.0 g/dL 4.6   ALT      0 - 50 U/L 29   AST      0 - 45 U/L 21   CRP Inflammation      0.0 - 8.0 mg/L <2.9   Sed Rate      0 - 20 mm/h 8   Vitamin D Deficiency screening      20 - 75 ug/L 46   Ferritin      8 - 252 ng/mL 18   TSH      0.40 - 4.00 mU/L 0.49   T4 Free      0.76 - 1.46 ng/dL 1.06   Free T3      2.3 - 4.2 pg/mL 2.8     Component      Latest Ref Rn 11/17/2015   Testosterone Total      8 - 60 ng/dL 19   Sex Hormone Binding Globulin      30 - 135 nmol/L 32   Free Testosterone Calculated      0.11 - 0.58 ng/dL 0.34   Vitamin A       0.61   Retinol Palmitate       <0.02 . . .   Vitamin A Interp       Normal . . .   Thyroglobulin Antibody      <40 IU/mL <20   Thyroid Peroxidase Antibody      <35 IU/mL 31   DHEA Sulfate      35 - 430 ug/dL 101   Zinc       68     Component      Latest Ref Rng 1/27/2016   Sodium      133 - 144 mmol/L 135   Potassium      3.4 -  5.3 mmol/L 4.0   Chloride      94 - 109 mmol/L 104   Carbon Dioxide      20 - 32 mmol/L 24   Anion Gap      3 - 14 mmol/L 7   Glucose      70 - 99 mg/dL 88   Urea Nitrogen      7 - 30 mg/dL 20   Creatinine      0.52 - 1.04 mg/dL 1.13 (H)   GFR Estimate      >60 mL/min/1.7m2 51 (L)   GFR Estimate If Black      >60 mL/min/1.7m2 62   Calcium      8.5 - 10.1 mg/dL 9.4   Bilirubin Total      0.2 - 1.3 mg/dL 0.7   Albumin      3.4 - 5.0 g/dL 4.3   Protein Total      6.8 - 8.8 g/dL 7.7   Alkaline Phosphatase      40 - 150 U/L 66   ALT      0 - 50 U/L 24   AST      0 - 45 U/L 18   Cholesterol      <200 mg/dL 112   Triglycerides      <150 mg/dL 100   HDL Cholesterol      >49 mg/dL 34 (L)   LDL Cholesterol Calculated      <100 mg/dL 58   Non HDL Cholesterol      <130 mg/dL 78   N-Terminal Pro Bnp      0 - 125 pg/mL 29   Hemoglobin A1C      4.3 - 6.0 % 7.0 (H)   Amylase      30 - 110 U/L 60   Lipase      73 - 393 U/L 394 (H)   Troponin I ES      0.000 - 0.045 ug/L <0.015 . . .     Component      Latest Ref Rng 2/24/2016   Angiotensin Converting Enzyme       <5 (L) . . .   Neutrophil Cytoplasmic IgG Antibody       <1:20 . . .   Sed Rate      0 - 20 mm/h 86 (H)     Copath Report      Patient Name: FAVIO MARTINEZ   MR#: [id number]   Specimen #: [id number]   Collected: 3/15/2016   Received: 3/15/2016   Reported: 3/17/2016 13:33   Ordering Phy(s): PARVEEN ENRIQUEZ     ORIGINAL REPORT FOLLOWS ADDENDUM  ADDENDUM     TO ORIGINAL REPORT   Status: Signed Out   Date Ordered:3/18/2016   Date Complete:3/18/2016   Date Reported:3/18/2016 12:06   Signed Out By: Marianne Godfrey MD     INTERPRETATION:   This addendum is done to incorporate the results of fungal (GMS) stains   done on the specimen.  Specimen is negative for fungal organisms.  The   original final diagnosis remains unchanged.     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  Negative for malignancy(Please   see microscopic description)     Electronically signed out by:     Marianne Godfrey MD     CLINICAL HISTORY:   right orbital mass     GROSS:   The specimen is received labeled \"right orbital biopsy\" and consists of   red-tan nodule measuring 1.5 x 0.9 x 0.6 cm with one smooth side and   opposite rough side consistent with periosteum.  The specimen is   bisected revealing homogenous pale tan fleshy cut surface.  Touch   preparations are prepared, air dried and fixed, portion of specimen is   submitted in RPMI for possible lymphoma workup [facility],   ([facility], [place], [place] ).  The remainder is entirely submitted.   (Dictated by: Marianne Godfrey MD 3/15/2016 04:45 PM)     MICROSCOPIC:     Specimen consists of portion of lacrimal gland with acute and chronic   inflammation.  Focal area of microabscess formation associated with   small areas of necrosis are also present.  Inflammation is seen   extending to the periorbital adipose tissue forming panniculitis.   Specimen is negative for malignancy.  Samples sent for immunophenotyping   to [facility], ([facility], [place], [place] ) reveals no evidence   of monoclonality or aberrant antigen expression.  A GMS (fungal) stain   is pending and results will be reported as an addendum.     CPT Codes:   A: 16810-VF5, 51631-PIWJP, SOH, 36234-UFXSC, 21493-GUDI     TESTING LAB LOCATION:   [address]  [id number] [phone]     COLLECTION SITE:   [facility]   [facility]            Component      Latest Ref Rng 4/29/2016   Nucleated RBCs      0 /100 0   Absolute Neutrophil      1.6 - 8.3 10e9/L 8.9 (H)   Absolute Lymphocytes      0.8 - 5.3 10e9/L 3.2   Absolute Monocytes      0.0 - 1.3 10e9/L 0.8   Absolute Eosinophils      " 0.0 - 0.7 10e9/L 0.2   Absolute Basophils      0.0 - 0.2 10e9/L 0.1   Abs Immature Granulocytes      0 - 0.4 10e9/L 0.1   Absolute Nucleated RBC       0.0   IGG      695 - 1620 mg/dL 836   IgG1      300 - 856 mg/dL 280 (L)   IgG2      158 - 761 mg/dL 277   IgG3      24 - 192 mg/dL 35   IgG4      11 - 86 mg/dL 16   RNP Antibody IgG      0.0 - 0.9 AI <0.2 . . .   Smith SUNNY Antibody IgG      0.0 - 0.9 AI <0.2 . . .   SSA (Ro) (SUNNY) Antibody, IgG      0.0 - 0.9 AI <0.2 . . .   SSB (La) (SUNNY) Antibody, IgG      0.0 - 0.9 AI <0.2 . . .   Scleroderma Antibody Scl-70 SUNNY IgG      0.0 - 0.9 AI <0.2 . . .   Cholesterol      <200 mg/dL 154   Triglycerides      <150 mg/dL 220 (H)   HDL Cholesterol      >49 mg/dL 42 (L)   LDL Cholesterol Calculated      <100 mg/dL 67   Non HDL Cholesterol      <130 mg/dL 111   M Tuberculosis Result      NEG Negative   M Tuberculosis Antigen Value       0.00   Albumin      3.4 - 5.0 g/dL 4.3   ALT      0 - 50 U/L 30   AST      0 - 45 U/L 10   Complement C3      76 - 169 mg/dL 157   Complement C4      15 - 50 mg/dL 32   CRP Inflammation      0.0 - 8.0 mg/L <2.9   Sed Rate      0 - 20 mm/h 2   DNA-ds      <10 IU/mL 1   Cyclic Citrullinated Peptide Antibody, IgG      <7 U/mL 1   Rheumatoid Factor      <20 IU/mL <20   Proteinase 3 Antibody IgG      0.0 - 0.9 AI <0.2 . . .   Myeloperoxidase Antibody IgG      0.0 - 0.9 AI 2.5 (H)   Vitamin D Deficiency screening      20 - 75 ug/L 24   Hemoglobin A1C      4.3 - 6.0 % 7.9 (H)   ALLI by IFA IgG       1:40 (A) . . .     Component      Latest Ref Rng & Units 4/7/2017   WBC      4.0 - 11.0 10e9/L 11.3 (H)   RBC Count      3.8 - 5.2 10e12/L 4.77   Hemoglobin      11.7 - 15.7 g/dL 12.5   Hematocrit      35.0 - 47.0 % 38.7   MCV      78 - 100 fl 81   MCH      26.5 - 33.0 pg 26.2 (L)   MCHC      31.5 - 36.5 g/dL 32.3   RDW      10.0 - 15.0 % 13.2   Platelet Count      150 - 450 10e9/L 347   Diff Method       Automated Method   % Neutrophils      % 67.1   %  Lymphocytes      % 22.9   % Monocytes      % 6.2   % Eosinophils      % 3.0   % Basophils      % 0.4   % Immature Granulocytes      % 0.4   Nucleated RBCs      0 /100 0   Absolute Neutrophil      1.6 - 8.3 10e9/L 7.5   Absolute Lymphocytes      0.8 - 5.3 10e9/L 2.6   Absolute Monocytes      0.0 - 1.3 10e9/L 0.7   Absolute Eosinophils      0.0 - 0.7 10e9/L 0.3   Absolute Basophils      0.0 - 0.2 10e9/L 0.1   Abs Immature Granulocytes      0 - 0.4 10e9/L 0.1   Absolute Nucleated RBC       0.0   IGG      695 - 1620 mg/dL 962   IgG1      300 - 856 mg/dL Test sent to [facility]. See ARMISC.   IgG2      158 - 761 mg/dL Test sent to [facility]. See ARMISC.   IgG3      24 - 192 mg/dL Test sent to [facility]. See ARMISC.   IgG4      11 - 86 mg/dL Test sent to [facility]. See ARMISC.   Result       SEE NOTE . . .   Test Name       IGG SUBCLASSES   Send Outs Misc Test Code       12243   Send Outs Misc Test Specimen       Serum   Creatinine      0.52 - 1.04 mg/dL 1.20 (H)   GFR Estimate      >60 mL/min/1.7m2 47 (L)   GFR Estimate If Black      >60 mL/min/1.7m2 57 (L)   Creatinine Urine      mg/dL    Albumin Urine mg/L      mg/L    Albumin Urine mg/g Cr      0 - 25 mg/g Cr    Myeloperoxidase Antibody IgG      0.0 - 0.9 AI 2.9 (H)   Proteinase 3 Antibody IgG      0.0 - 0.9 AI <0.2 . . .   Neutrophil Cytoplasmic IgG Antibody       1:80 (A) . . .   Angiotensin Converting Enzyme       <5 (L) . . .   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      Comparison: MRI brain 5/29/2013      Technique:   Orbits: Axial and coronal T1-weighted, and coronal T2-weighted images  obtained without intravenous contrast. Post-intravenous contrast  (using gadolinium) sagittal FLAIR, were obtained with fat-saturation,  focused on the orbits.  Brain: Axial susceptibility-weighted and FLAIR sequences were obtained  of the whole brain without intravenous contrast, and postcontrast  axial T1-weighted images were obtained through the whole brain.   Contrast: 7.5mL Gadavist     Findings:  New asymmetric enlargement of the right lacrimal gland and enhancement  of the right lacrimal gland with surrounding ill-defined crescentic T2  hyperintense signal and enhancement within the right superior  superotemporal orbit, predominantly involving the extraconal space  with slight intraconal extension. No definite associated restricted  diffusion. No discrete extraocular muscle enlargement. Normal  symmetric optic nerve signal. Cavernous sinuses and orbital apices are  normal. Globes are normal.     Whole brain images are symmetric minimal leukoaraiosis and generalized  parenchymal volume loss. Ventricles are not enlarged. No intracranial  hemorrhage, mass effect, midline shift or abnormal extra axial fluid  collection. No abnormal foci of intracranial enhancement or restricted  diffusion. Paranasal sinuses and mastoid air cells are clear.            Impression:    New abnormal enlargement of the right lacrimal gland with surrounding  ill-defined T2 hyperintense signal and enhancement centered in the  superotemporal right orbital fat, which may represent   infectious/inflammatory dacryadenitis, orbital pseudotumor, lymphoma,  carcinoma, sarcoidosis or IgG4 disease..     I have personally reviewed the examination and initial interpretation  and I agree with the findings.     PATRICIA BRANTLEY MD      Component      Latest Ref Rng & Units 6/1/2017   WBC      4.0 -  11.0 10e9/L 12.0 (H)   RBC Count      3.8 - 5.2 10e12/L 5.18   Hemoglobin      11.7 - 15.7 g/dL 13.8   Hematocrit      35.0 - 47.0 % 41.0   MCV      78 - 100 fl 79   MCH      26.5 - 33.0 pg 26.6   MCHC      31.5 - 36.5 g/dL 33.7   RDW      10.0 - 15.0 % 14.8   Platelet Count      150 - 450 10e9/L 322   Diff Method       Automated Method   % Neutrophils      % 76.7   % Lymphocytes      % 16.5   % Monocytes      % 4.6   % Eosinophils      % 1.9   % Basophils      % 0.3   Absolute Neutrophil      1.6 - 8.3 10e9/L 9.2 (H)   Absolute Lymphocytes      0.8 - 5.3 10e9/L 2.0   Absolute Monocytes      0.0 - 1.3 10e9/L 0.6   Absolute Eosinophils      0.0 - 0.7 10e9/L 0.2   Absolute Basophils      0.0 - 0.2 10e9/L 0.0   Color Urine       Yellow   Appearance Urine       Clear   Glucose Urine      NEG mg/dL Negative   Bilirubin Urine      NEG Negative   Ketones Urine      NEG mg/dL Negative   Specific Gravity Urine      1.003 - 1.035 1.010   pH Urine      5.0 - 7.0 pH 5.5   Protein Albumin Urine      NEG mg/dL Negative   Urobilinogen Urine      0.2 - 1.0 EU/dL 0.2   Nitrite Urine      NEG Negative   Blood Urine      NEG Negative   Leukocyte Esterase Urine      NEG Negative   Source       Midstream Urine   WBC Urine      0 - 2 /HPF O - 2   RBC Urine      0 - 2 /HPF O - 2   Squamous Epithelial /LPF Urine      FEW /LPF Few   Bacteria Urine      NEG /HPF Few (A)   Creatinine      0.52 - 1.04 mg/dL 1.19 (H)   GFR Estimate      >60 mL/min/1.7m2 48 (L)   GFR Estimate If Black      >60 mL/min/1.7m2 58 (L)   Protein Random Urine      g/L <0.05   Protein Total Urine g/gr Creatinine      0 - 0.2 g/g Cr Unable to calculate due to low value   Myeloperoxidase Antibody IgG      0.0 - 0.9 AI 1.9 (H)   Proteinase 3 Antibody IgG      0.0 - 0.9 AI <0.2 . . .   ALT      0 - 50 U/L 29   AST      0 - 45 U/L 18   Albumin      3.4 - 5.0 g/dL 4.6   CRP Inflammation      0.0 - 8.0 mg/L 6.1   Sed Rate      0 - 30 mm/h 13   Creatinine Urine Random       "mg/dL 54   Neutrophil Cytoplasmic IgG Antibody       <1:20 . . .   Creatinine Urine      mg/dL 54       Patient Name: FAVIO MARTINEZ   MR#: [id number]   Specimen #: [id number]   Collected: 8/4/2017   Received: 8/4/2017   Reported: 8/9/2017 16:19   Ordering Phy(s): CRISTHIAN FLORES     For improved result formatting, select 'View Enhanced Report Format'   under Linked Documents section.     SPECIMEN(S):   Eye, right lacrimal gland     FINAL DIAGNOSIS:   Eye, right, \"lacrimal gland\", biopsy   - Dense fibrosis and patchy inflammation   - No residual lacrimal gland seen     COMMENT:   Sections show tissue involved by dense fibrosis and patchy inflammation.   There is no morphologic or immunohistochemical evidence of lymphoma. By   separate report, concurrent flow cytometry studies ([id number]),   performed at the [facility], show polytypic B cells and no   aberrant immunophenotype on T cells. Immunohistochemical stains for IgG   and IgG-4 were performed, which provide no support for IgG-4-related   disease. Some of these changes may be related to prior surgery. Sjögren   disease is not excluded. There is no evidence of malignancy. Dr. Max Conway has reviewed this case and concurs.     Electronically signed out by:     Antonella Beth M.D.   INDICATION:  Right eye edema. Reported history of right orbital biopsy yielding pathology consistent with idiopathic inflammation.    TECHNIQUE:  Noncontrast CT images were obtained through the brain and facial bones.    COMPARISON:  CT sinus 03/27/2017, MRI brain and orbits 02/15/2016.     FINDINGS:  CT facial bones:   Large soft tissue lesion centered within the lateral intraconal and extraconal right orbit has significantly increased in size compared to the CT dated 03/27/2017. The lesion is inseparable from the right superior, lateral, and inferior rectus muscles, as well as the right lacrimal gland. The lesion demonstrates extension posteriorly slightly proximal " to the orbital apex, as well as extension into the medial orbit superiorly and anteriorly. Mass effect includes medial bowing of the optic nerve sheath complex and pronounced proptosis of the right globe.  No mass is identified in the right orbit.  Torus palatinus.   Minimal mucosal thickening in the ethmoid air cells. The remainder of the visualized paranasal sinuses are clear.  CT brain:  The ventricles and sulci within normal limits for patient age. No mass effect or midline shift. The gray-white differentiation is maintained.  No acute intracranial hemorrhage or pathologic extra-axial fluid collection.  The calvarium is intact. The mastoid air cells are clear.    IMPRESSION:  1. Large soft tissue lesion centered within the lateral intraconal and extraconal right orbit has significantly increased in size compared to the CT dated 03/27/2017. The lesion is inseparable from the right lacrimal gland and rectus musculature. Mass effect includes medial bowing of the optic nerve sheath complex and pronounced proptosis of the right globe. Given provided history, findings may represent a progressive inflammatory etiology (pseudotumor). Other differential considerations, such as sarcoidosis or lymphoma, could also have this appearance.  2. No acute intracranial hemorrhage or intracranial mass effect.    Please note that all CT scans at this facility use dose modulation, iterative reconstruction, and/or weight-based dosing when appropriate to reduce radiation dose to as low as reasonably achievable.    Dictated by Charles Ward MD @ Jul 16 2018  3:54PM    Signed by Dr. Charles Ward @ Jul 16 2018  4:20PM    CT ORBITAL WO CONTRAST 7/11/2019 3:42 PM     History: orbital pseudotumor; Subjective visual disturbance; Visual  field defect; Idiopathic orbital inflammatory syndrome  ICD-10: Subjective visual disturbance; Visual field defect; Idiopathic  orbital inflammatory syndrome     Comparison: CT 3/6/2019 and 7/16/2018, MRI  brain 5/9/2017                                                                   Impression:   1. 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  2. Slightly decreased right orbital proptosis since 3/6/2019.  Component      Latest Ref Rng & Units 10/29/2019   WBC      4.0 - 11.0 10e9/L 11.6 (H)   RBC Count      3.8 - 5.2 10e12/L 4.73   Hemoglobin      11.7 - 15.7 g/dL 12.3   Hematocrit      35.0 - 47.0 % 39.1   MCV      78 - 100 fl 83   MCH      26.5 - 33.0 pg 26.0 (L)   MCHC      31.5 - 36.5 g/dL 31.5   RDW      10.0 - 15.0 % 13.8   Platelet Count      150 - 450 10e9/L 371   Diff Method       Automated Method   % Neutrophils      % 75.6   % Lymphocytes      % 14.8   % Monocytes      % 5.3   % Eosinophils      % 3.5   % Basophils      % 0.5   % Immature Granulocytes      % 0.3   Nucleated RBCs      0 /100 0   Absolute Neutrophil      1.6 - 8.3 10e9/L 8.8 (H)   Absolute Lymphocytes      0.8 - 5.3 10e9/L 1.7   Absolute Monocytes      0.0 - 1.3 10e9/L 0.6   Absolute Eosinophils      0.0 - 0.7 10e9/L 0.4   Absolute Basophils      0.0 - 0.2 10e9/L 0.1   Abs Immature Granulocytes      0 - 0.4 10e9/L 0.0   Absolute Nucleated RBC       0.0   Color Urine       Yellow   Appearance Urine       Clear   Glucose Urine      NEG:Negative mg/dL 50 (A)   Bilirubin Urine      NEG:Negative Negative   Ketones Urine      NEG:Negative mg/dL 5 (A)   Specific Gravity Urine      1.003 - 1.035 1.023   Blood Urine      NEG:Negative Negative   pH Urine      5.0 - 7.0 pH 5.0   Protein Albumin Urine      NEG:Negative mg/dL Negative   Urobilinogen mg/dL      0.0 - 2.0 mg/dL 0.0   Nitrite Urine      NEG:Negative Negative   Leukocyte Esterase Urine      NEG:Negative Negative   Source       Unspecified Urine   WBC Urine      0 - 5 /HPF <1   RBC Urine      0 - 2 /HPF 2    Mucous Urine      NEG:Negative /LPF Present (A)   Hyaline Casts      0 - 2 /LPF 5 (H)   IGG      695 - 1,620 mg/dL 682 (L)   IGA      70 - 380 mg/dL 238   IGM      60 - 265 mg/dL 46 (L)   Creatinine      0.52 - 1.04 mg/dL 1.04   GFR Estimate      >60 mL/min/1.73:m2 61   GFR Estimate If Black      >60 mL/min/1.73:m2 71   Neutrophil Cytoplasmic Antibody      <1:10 titer <1:10   Neutrophil Cytoplasmic Antibody Pattern       The ANCA IFA is <1:10.  No further testing will be performed.   CD19 B Cells      6 - 27 % <1 (L)   Absolute CD19      107 - 698 cells/uL <1 (L)   Protein Random Urine      g/L 0.26   Protein Total Urine g/gr Creatinine      0 - 0.2 g/g Cr 0.13   Myeloperoxidase Antibody IgG      0.0 - 0.9 AI 1.2 (H)   Proteinase 3 Antibody IgG      0.0 - 0.9 AI <0.2   Magnesium      1.6 - 2.3 mg/dL 1.7   Ferritin      8 - 252 ng/mL 67   Vitamin B12      193 - 986 pg/mL 592   Vitamin D Deficiency screening      20 - 75 ug/L 29   Sed Rate      0 - 30 mm/h 10   CRP Inflammation      0.0 - 8.0 mg/L <2.9   Albumin      3.4 - 5.0 g/dL 4.3   ALT      0 - 50 U/L 26   AST      0 - 45 U/L 17   Creatinine Urine      mg/dL 207     Component      Latest Ref Rng & Units 12/5/2019   WBC      4.0 - 11.0 10e9/L    RBC Count      3.8 - 5.2 10e12/L    Hemoglobin      11.7 - 15.7 g/dL    Hematocrit      35.0 - 47.0 %    MCV      78 - 100 fl    MCH      26.5 - 33.0 pg    MCHC      31.5 - 36.5 g/dL    RDW      10.0 - 15.0 %    Platelet Count      150 - 450 10e9/L    Diff Method          % Neutrophils      %    % Lymphocytes      %    % Monocytes      %    % Eosinophils      %    % Basophils      %    % Immature Granulocytes      %    Nucleated RBCs      0 /100    Absolute Neutrophil      1.6 - 8.3 10e9/L    Absolute Lymphocytes      0.8 - 5.3 10e9/L    Absolute Monocytes      0.0 - 1.3 10e9/L    Absolute Eosinophils      0.0 - 0.7 10e9/L    Absolute Basophils      0.0 - 0.2 10e9/L    Abs Immature Granulocytes      0 - 0.4 10e9/L     Absolute Nucleated RBC          Color Urine          Appearance Urine          Glucose Urine      NEG:Negative mg/dL    Bilirubin Urine      NEG:Negative    Ketones Urine      NEG:Negative mg/dL    Specific Gravity Urine      1.003 - 1.035    Blood Urine      NEG:Negative    pH Urine      5.0 - 7.0 pH    Protein Albumin Urine      NEG:Negative mg/dL    Urobilinogen mg/dL      0.0 - 2.0 mg/dL    Nitrite Urine      NEG:Negative    Leukocyte Esterase Urine      NEG:Negative    Source          WBC Urine      0 - 5 /HPF    RBC Urine      0 - 2 /HPF    Mucous Urine      NEG:Negative /LPF    Hyaline Casts      0 - 2 /LPF    IGG      695 - 1,620 mg/dL 616 (L)   IGA      70 - 380 mg/dL 214   IGM      60 - 265 mg/dL 33 (L)   Creatinine      0.52 - 1.04 mg/dL    GFR Estimate      >60 mL/min/1.73:m2    GFR Estimate If Black      >60 mL/min/1.73:m2    Neutrophil Cytoplasmic Antibody      <1:10 titer    Neutrophil Cytoplasmic Antibody Pattern          CD19 B Cells      6 - 27 % <1   Absolute CD19      107 - 698 cells/uL <1   Protein Random Urine      g/L    Protein Total Urine g/gr Creatinine      0 - 0.2 g/g Cr    Myeloperoxidase Antibody IgG      0.0 - 0.9 AI    Proteinase 3 Antibody IgG      0.0 - 0.9 AI    Magnesium      1.6 - 2.3 mg/dL    Ferritin      8 - 252 ng/mL    Vitamin B12      193 - 986 pg/mL    Vitamin D Deficiency screening      20 - 75 ug/L    Sed Rate      0 - 30 mm/h    CRP Inflammation      0.0 - 8.0 mg/L    Albumin      3.4 - 5.0 g/dL    ALT      0 - 50 U/L    AST      0 - 45 U/L    Creatinine Urine      mg/dL      Component      Latest Ref Rng & Units 6/18/2020   WBC      4.0 - 11.0 10e9/L 11.1 (H)   RBC Count      3.8 - 5.2 10e12/L 4.42   Hemoglobin      11.7 - 15.7 g/dL 11.7   Hematocrit      35.0 - 47.0 % 35.9   MCV      78 - 100 fl 81   MCH      26.5 - 33.0 pg 26.5   MCHC      31.5 - 36.5 g/dL 32.6   RDW      10.0 - 15.0 % 13.2   Platelet Count      150 - 450 10e9/L 348   Diff Method       Automated  Method   % Neutrophils      % 94.1   % Lymphocytes      % 4.5   % Monocytes      % 0.7   % Eosinophils      % 0.1   % Basophils      % 0.3   % Immature Granulocytes      % 0.3   Nucleated RBCs      0 /100 0   Absolute Neutrophil      1.6 - 8.3 10e9/L 10.5 (H)   Absolute Lymphocytes      0.8 - 5.3 10e9/L 0.5 (L)   Absolute Monocytes      0.0 - 1.3 10e9/L 0.1   Absolute Eosinophils      0.0 - 0.7 10e9/L 0.0   Absolute Basophils      0.0 - 0.2 10e9/L 0.0   Abs Immature Granulocytes      0 - 0.4 10e9/L 0.0   Absolute Nucleated RBC       0.0   Color Urine       Gwen   Appearance Urine       Slightly Cloudy   Glucose Urine      NEG:Negative mg/dL Negative   Bilirubin Urine      NEG:Negative Negative   Ketones Urine      NEG:Negative mg/dL 5 (A)   Specific Gravity Urine      1.003 - 1.035 1.030   Blood Urine      NEG:Negative Negative   pH Urine      5.0 - 7.0 pH 5.0   Protein Albumin Urine      NEG:Negative mg/dL 30 (A)   Urobilinogen mg/dL      0.0 - 2.0 mg/dL 0.0   Nitrite Urine      NEG:Negative Negative   Leukocyte Esterase Urine      NEG:Negative Negative   Source       Midstream Urine   WBC Urine      0 - 5 /HPF 3   RBC Urine      0 - 2 /HPF 3 (H)   Squamous Epithelial /HPF Urine      0 - 1 /HPF 1   Mucous Urine      NEG:Negative /LPF Present (A)   Hyaline Casts      0 - 2 /LPF 38 (H)   IGG      610 - 1,616 mg/dL 689   IGA      84 - 499 mg/dL 224   IGM      35 - 242 mg/dL 41   Creatinine      0.52 - 1.04 mg/dL 1.83 (H)   GFR Estimate      >60 mL/min/1.73:m2 31 (L)   GFR Estimate If Black      >60 mL/min/1.73:m2 36 (L)   Neutrophil Cytoplasmic Antibody      <1:10 titer <1:10   Neutrophil Cytoplasmic Antibody Pattern       The ANCA IFA is <1:10.  No further testing will be performed.   CD19 B Cells      6 - 27 % <1   Absolute CD19      107 - 698 cells/uL <1   Myeloperoxidase Antibody IgG      0.0 - 0.9 AI 1.2 (H)   Proteinase 3 Antibody IgG      0.0 - 0.9 AI <0.2   Protein Random Urine      g/L 0.73   Protein Total  Urine g/gr Creatinine      0 - 0.2 g/g Cr 0.19   Vitamin D Deficiency screening      20 - 75 ug/L 34   Sed Rate      0 - 30 mm/h 18   CRP Inflammation      0.0 - 8.0 mg/L 4.8   Albumin      3.4 - 5.0 g/dL 3.6   AST      0 - 45 U/L 23   ALT      0 - 50 U/L 30   Creatinine Urine      mg/dL 385     ROS:  A comprehensive ROS was done, positives are per HPI.        HISTORY REVIEW:  Past Medical History:   Diagnosis Date     Abnormal glandular Papanicolaou smear of cervix 1992     Allergic rhinitis     Allergy, airborne subst     Arthritis      ASCVD (arteriosclerotic cardiovascular disease)      Chronic pain      Chronic pancreatitis (H)     idiopathic, Tx: PPI, antioxidants, pancreatic enzymes     Common migraine      Coronary artery disease      Costochondritis      Difficulty in walking(719.7)      Dyspnea on exertion      Ectasia, mammary duct     [facility], persistent nipple discharge     Elevated fasting glucose      Gastro-oesophageal reflux disease      Hernia      History of angina      Hyperlipidemia LDL goal < 100      Hypertension goal BP (blood pressure) < 140/90     Essential hypertension     Iron deficiency anemia      Ischemic cardiomyopathy      Menorrhagia      Migraine headaches      Mild persistent asthma      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 Matias, neg w/u, Rx'd w/prednisone     Ventral hernia, unspecified, without mention of obstruction or gangrene 2012     Past Surgical History:   Procedure Laterality Date     C ECHO HEART XTHORACIC,COMPLETE, W/O DOPPLER  04    [facility] WNL, EF 60%     C/SECTION, LOW TRANSVERSE           CARDIAC SURGERY      cardiac stent- recent in 16; 4 other stents     DILATION AND CURETTAGE N/A 2016    Procedure: DILATION AND CURETTAGE;  Surgeon: Nahed Butler MD;  Location: [facility]     HC UGI ENDOSCOPY W EUS  08    Dr. Pastrana, possible chronic pancreatitis, fatty liver     HERNIA REPAIR  2012    [place] [facility] [place]     INSERT INTRAUTERINE DEVICE N/A 2016    Procedure: INSERT INTRAUTERINE DEVICE;  Surgeon: Nahed Butler MD;  Location: [facility]     INT UTERINE FIBRIOD EMBOLIZATION  10/29/2014     LAPAROSCOPIC CHOLECYSTECTOMY  08    Dr. Arnol GRUBBS TX, CERVICAL      s/p LEEP     ORBITOTOMY Right 3/15/2016    Procedure: ORBITOTOMY;  Surgeon: Myron Cyr MD;  [facility]     ORBITOTOMY Right 2017    Procedure: ORBITOTOMY;  RIGHT ORBITOTOMY AND BIOPSY;  Surgeon: Charis Holbrook MD;  [facility]     REPAIR PTOSIS Right 2017    Procedure: REPAIR PTOSIS;  RIGHT UPPER LID PTOSIS REPAIR;  Surgeon: Myron Cyr MD;  Location: [facility]     UPPER GI ENDOSCOPY  10/21/08    mild gastritis, Dr. Hidalgo     Family History   Problem Relation Age of Onset     Heart Disease Father 50        heart attack     Cerebrovascular Disease Father      Diabetes Father      Hypertension Father      Depression Father      C.A.D. Father      Hypertension Mother      Arthritis Mother      Heart Disease Mother         long qt syndrome     Thyroid Disease Mother      C.A.D. Mother      Heart Disease Brother 15        MI at 15, 16.      Diabetes Maternal Uncle      Hypertension Maternal Aunt      Hypertension Maternal Uncle      Arthritis Brother         he  passed away, had severe arthritis at age 11     Arthritis Maternal Uncle      Eye Disorder Maternal Uncle         cataracts     Neurologic Disorder Sister         migraines     Neurologic Disorder Sister         migraines     Respiratory Son         asthma     Cerebrovascular Disease Maternal Uncle      C.A.D. Brother      Family History Negative Other         neg for RA, SLE     Unknown/Adopted No family hx of         unknown neurological issues in her family, mother was adopted     Skin Cancer No family hx of         No known family hx of skin cancer     Social History     Socioeconomic History     Marital status: Single     Spouse name: Not on file     Number of children: 1     Years of education: Not on file     Highest education level: Not on file   Occupational History     Employer: NONE    Social Needs     Financial resource strain: Not on file     Food insecurity     Worry: Not on file     Inability: Not on file     Transportation needs     Medical: Not on file     Non-medical: Not on file   Tobacco Use     Smoking status: Current Every Day Smoker     Packs/day: 0.20     Years: 1.00     Pack years: 0.20     Types: Cigarettes     Last attempt to quit: 2016     Years since quittin.4     Smokeless tobacco: Never Used   Substance and Sexual Activity     Alcohol use: No     Alcohol/week: 0.0 standard drinks     Drug use: No     Sexual activity: Not Currently   Lifestyle     Physical activity     Days per week: Not on file     Minutes per session: Not on file     Stress: Not on file   Relationships     Social connections     Talks on phone: Not on file     Gets together: Not on file     Attends Synagogue service: Not on file     Active member of club or organization: Not on file     Attends meetings of clubs or organizations: Not on file     Relationship status: Not on file     Intimate partner violence     Fear of current or ex partner: Not on file     Emotionally abused: Not on file     Physically  abused: Not on file     Forced sexual activity: Not on file   Other Topics Concern     Parent/sibling w/ CABG, MI or angioplasty before 65F 55M? Yes   Social History Narrative    Balanced Diet - sometimes    Osteoporosis Prevention Measures - Dairy servings per day: 2 servings weekly    Regular Exercise -  Yes Describe walking 4 times a week    Dental Exam - NO    Seatbelts used - Yes    Self Breast Exam - Yes    Abuse: Current or Past (Physical, Sexual or Emotional)- No    Do you have any concerns about STD's -  No    Do you feel safe in your environment - No    Guns stored in the home - No    Sunscreen used - Yes    Lipids -  YES - Date: 053102    Glucose -  YES - Date: 012804    Eye Exam - YES - Date: one year ago    Colon Cancer Screening - No    Hemoccults - NO    Pap Test -  YES - Date: 070904, remote history of LEEP    Mammography - YES - Date: last spring, would like to discuss, needs a referral to [facility]    DEXA - NO    Immunizations reviewed and up to date - Yes, last td given in 1997 or 1998     Patient Active Problem List   Diagnosis     Seasonal affective disorder (H)     Allergic rhinitis     PCOS (polycystic ovarian syndrome)     Moderate persistent asthma     Chronic pancreatitis (H)     Hypertension goal BP (blood pressure) < 140/90     Common migraine     NSTEMI (non-ST elevated myocardial infarction) (H)     Hyperlipidemia LDL goal <70     ASCVD (arteriosclerotic cardiovascular disease)     GERD (gastroesophageal reflux disease)     Ischemic cardiomyopathy     Hypertensive heart disease     Uterine leiomyoma     Iron deficiency anemia     Costochondritis     Vitamin D deficiency     Breast cancer screening     Spinal stenosis in cervical region     Fibromyalgia     Seronegative rheumatoid arthritis (H)     Type 2 diabetes, HbA1C goal < 8% (H)     Type 2 diabetes mellitus with other specified complication (H)     Hyperlipidemia associated with type 2 diabetes mellitus (H)      Hypertension associated with diabetes (H)     Overweight with body mass index (BMI) 25.0-29.9     Tobacco use disorder     Telogen effluvium     CAD S/P percutaneous coronary angioplasty     Status post coronary angiogram     ANCA-associated vasculitis (H)     Wegener's vasculitis (H)     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 All misscarriages were in first trimester. H/o OC use in the past. 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    PMHx, FHx, SHx were reviewed, unchanged.      Outpatient Encounter Medications as of 2020   Medication Sig Dispense Refill     acetaminophen (TYLENOL) 325 MG tablet Take 1-2 tablets (325-650 mg) by mouth every 6 hours as needed for pain (headache) 250 tablet 0     albuterol (2.5 MG/3ML) 0.083% neb solution INL 1 VIAL VIA NEBULIZATION Q 4 TO 6 HOURS PRN  1     albuterol (PROAIR HFA, PROVENTIL HFA, VENTOLIN HFA) 108 (90 BASE) MCG/ACT inhaler Inhale 2 puffs into the lungs every 6 hours as needed for shortness of breath / dyspnea or wheezing 3 Inhaler 1     aspirin (ASPIRIN LOW DOSE) 81 MG EC tablet Take 1 tablet (81 mg) by mouth daily 30 tablet 11     blood glucose monitoring (NO BRAND SPECIFIED) meter device kit Use to test blood sugar 4 X times daily or as directed. (Patient taking differently: 1 kit Use to test blood sugar 4 X times daily or as directed.) 1 kit 0     blood glucose monitoring (NO BRAND SPECIFIED) test strip 1 strip by In Vitro route 4 times daily Test as directed. Please dispense three months and three months of refills. Thank you. (Patient taking differently: 1 strip by In Vitro route 4 times daily Test as directed. Please dispense three months and three months of refills. Thank you.) 360 each 3     Blood Pressure Monitor KIT 1 each daily Monitor home blood pressure as instructed by physician.  Dispense [place] blood pressure kit. 1 kit 0     calcium carbonate (TUMS) 500 MG chewable  tablet Take 3-4 chew tab by mouth daily as needed.       clopidogrel (PLAVIX) 75 MG tablet Take 1 tablet (75 mg) by mouth daily 30 tablet 11     COMPRESSION STOCKINGS 2 each daily Apply one 10-15 mmHg compression stocking to each lower extgmierty during the day and remove before bedtime. 4 each 2     cyclobenzaprine (FLEXERIL) 10 MG tablet Take 1 tablet (10 mg) by mouth 2 times daily as needed for muscle spasms 60 tablet 2     cycloSPORINE (RESTASIS) 0.05 % ophthalmic emulsion Place 1 drop into both eyes 2 times daily 60 each 11     cycloSPORINE (RESTASIS) 0.05 % ophthalmic emulsion Place 1 drop into the right eye every 12 hours       desonide (DESOWEN) 0.05 % cream Apply topically 2 times daily       diclofenac (VOLTAREN) 1 % topical gel Apply 2 g topically 4 times daily Apply to affected joints 100 g 3     dicyclomine (BENTYL) 20 MG tablet TAKE 1 TABLET(20 MG) BY MOUTH FOUR TIMES DAILY AS NEEDED. Pls schedule clinic appt for refills. 60 tablet 0     diphenhydrAMINE (BENADRYL) 25 MG capsule TK 1 TO 2 CS PO QHS  4     EPIPEN 2-RIKY 0.3 MG/0.3ML injection INJECT 0.3 MG INTO THE MUSCLE PRF ANAPHYALAXIS  0     ferrous gluconate (FERGON) 324 (38 Fe) MG tablet Take 1 tablet (324 mg) by mouth 2 times daily (with meals) DO NOT CRUSH. Absorbed best on an empty stomach. If stomach upset occurs, can take with meals. For additional refills, please schedule a follow-up appointment with Dr Solano at [phone] 180 tablet 0     fexofenadine (ALLEGRA) 180 MG tablet Take 1 tablet by mouth daily as needed. 90 tablet 3     fluocinolone (SYNALAR) 0.01 % solution Apply topically daily as needed       fluticasone-vilanterol (BREO ELLIPTA) 100-25 MCG/INH inhaler Inhale 1 puff into the lungs daily       folic acid (FOLVITE) 1 MG tablet Take 1 tablet by mouth daily (Patient not taking: Reported on 10/29/2019) 90 tablet 3     hydroxychloroquine (PLAQUENIL) 200 MG tablet Take 2 tablets (400 mg) by mouth daily (Annual eye exam/plaquenil  screening) 180 tablet 0     insulin pen needle (BD MARCK U/F) 32G X 4 MM USE DAILY OR AS DIRECTED 300 each 3     ketoconazole (NIZORAL) 2 % shampoo Apply topically daily as needed       Ketoprofen POWD 1 g 2 times daily as needed (prn pain) 500 g 3     lidocaine (LMX4) 4 % external cream Apply topically once as needed for mild pain (prn pain) 30 g 3     lifitegrast (XIIDRA) 5 % opthalmic solution Place 1 drop into both eyes 2 times daily 20 each 3     lisinopril-hydrochlorothiazide (PRINZIDE/ZESTORETIC) 20-25 MG tablet Take 1 tablet by mouth daily 30 tablet 11     Magnesium Oxide -Mg Supplement 250 MG TABS TK 1 T PO BID. REDUCE IF STOOLS LOOSEN  11     metFORMIN (GLUCOPHAGE-XR) 500 MG 24 hr tablet TAKE 2 TABLETS(1000 MG) BY MOUTH DAILY WITH DINNER (Patient taking differently: Take 2,000 mg by mouth daily ) 180 tablet 0     metoclopramide (REGLAN) 10 MG tablet Take 1 tablet (10 mg) by mouth 4 times daily (before meals and nightly) 90 tablet 0     metoprolol succinate ER (TOPROL-XL) 100 MG 24 hr tablet Take 1 tablet (100 mg) by mouth daily 30 tablet 11     metroNIDAZOLE (NORITATE) 1 % cream Apply topically daily       montelukast (SINGULAIR) 10 MG tablet Take 1 tablet (10 mg) by mouth At Bedtime 30 tablet 1     Multiple Vitamin (DAILY-GERALD) TABS Take 1 tablet by mouth daily  0     nitroGLYcerin (NITROSTAT) 0.4 MG sublingual tablet Place 1 tablet (0.4 mg) under the tongue every 5 minutes as needed for chest pain . After 3 doses, if pain persists call 911. 25 tablet 3     nystatin (MYCOSTATIN) 200129 UNITS TABS tablet TK 2 TS PO BID  0     ondansetron (ZOFRAN-ODT) 8 MG ODT tab Take 1 tablet (8 mg) by mouth every 8 hours as needed for nausea 60 tablet 1     pantoprazole (PROTONIX) 40 MG EC tablet Take 1 tablet (40 mg) by mouth daily Pork free tablets. (Patient taking differently: Take 40 mg by mouth as needed Pork free tablets.) 30 tablet 1     pravastatin (PRAVACHOL) 40 MG tablet Take 1 tablet (40 mg) by mouth daily 30  tablet 5     ranitidine (ZANTAC) 150 MG tablet Take 1 tablet (150 mg) by mouth 2 times daily (Patient not taking: Reported on 10/29/2019) 180 tablet 1     spironolactone (ALDACTONE) 25 MG tablet TAKE 1 TABLET BY MOUTH EVERY DAY TAKE ADDITIONAL 1/2 TABLET BY MOUTH EVERY DAY AS NEEDED FOR WEIGHT GAIN 30 tablet 5     sucralfate (CARAFATE) 1 GM tablet Take 1 tablet (1 g) by mouth 4 times daily 360 tablet 0     traMADol (ULTRAM) 50 MG tablet Take 1 tablet (50 mg) by mouth every 8 hours as needed for moderate pain 60 tablet 0     Triamcinolone Acetonide (NASACORT ALLERGY 24HR NA)        vitamin D2 (ERGOCALCIFEROL) 69363 units (1250 mcg) capsule Take 1 capsule (50,000 Units) by mouth every 7 days 12 capsule 0     vitamin D3 (CHOLECALCIFEROL) 2000 units (50 mcg) tablet Take 1 tablet (2,000 Units) by mouth daily 90 tablet 1     No facility-administered encounter medications on file as of 7/1/2020.        Allergies   Allergen Reactions     Amoxicillin-Pot Clavulanate      Augmentin      Unknown possible hives and edema     Azithromycin      Diatrizoate Other (See Comments)     Pt wants this listed because she is allergic to shellfish      Imitrex [Sumatriptan]      Severe face/neck/chest tightness and flushing side effects      Penicillins Hives     Unknown      Pork Allergy      Stomach pain, cramping, diarrhea, itching, nausea and headaches     Shellfish Allergy Hives and Swelling     Sulfa Drugs Hives and Swelling     Zithromax [Azithromycin Dihydrate] Swelling     Unknown          Ph.E:    Not done, phone visit    Assessment/ plan:    1-Seropositive non-erosive RA (arthritis, AM stiffness, high CRP, RF 26 but re-check neg 3/2015 on HCQ) Dx 5/2013, FMS, new pleuritic CP 3/20-15-5/2015 resolved on steroids. GPA. She is on  mg bid since 5/2014. She tolerates it well and it's helping some but not enough to control all her pain. Continues having flare ups of joint pain, swelling also has FMS. Joint sx are getting worse.  Had high ESR/CRP in 3/2015 and new pleuritic CP between 3/2015-5/2015 which resolved after taking medrol dose pack prescribed 5/20/2015. Her pleuritic CP could be related to her RA. Then stopped tramadol as it blames it for recent episodes of CP.    In the past, MTX was recommended, she declined.    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 Etiology of mass unclear, but it's inflammatory and related to pt's underlying autoimmune disease (inflammatory arthritis, serositis). 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    Her work up showed borderline + ALLI and slightly elevated MPO. I told her prednisone is not good for her diabetes and weight gain. Recommended a trial of MTX as next step. Discussed risks on details. I called her neuro-ophthalmologist at last visit who agreed with trial of MTX (she suspects pseudo-sarcoidosis or sarcoid like disease but no granuloma and ACE not elevated).     Unfortunately despite all my efforts to explain risks vs benefits (more than risks) of MTX as steroid sparing gent, Janine declined to try MTX. I was very concerned about her R orbital inflammatory mass as there is high chance of flare up off steroids and steroid causes her weigh gain and uncontrolled diabetes. I even offered her other steroid sparing gents like imuran. She declined that as well.    Her inflammation around R orbit has recurred now. On 4/7/2017, I spent quite amount of time discussing a trial of MTX. After discussing risks/benefits, she agreed to try it.     She is s/p Tx with MTX 10 mg po qwk 4/2017-5/2017. No benefits and more GI SEs, more hair loss and headaches since start of MTX. No benefits. I called and spoke with her neuro-ophthalmologist who recommended a second opinion from  neuro-ophthalmology at the . I suspect her presentation to be ANCA vasculitis related but wanted to repeat imaging and get neuro-ophthalmology eval here. She was recommended to have repeat biopsy to r/o lymphoma.    In 5/2017, prescribed her prednisone 40-30-20-10 mg a day each for 3 days then stop (she declined higher dose and longer taper despite my concern for worsening swelling around orbit), Her R campos-orbital edema significantly improved. MTX was stopped in 5/2017 given side effects. Plan was to start imuran 50 mg po qd (NL TPMT); however we decided to hold off till she gets biopsy done.    Repeat R lacrimal gland biopsy in 8/2017 showed non specific inflammation, it ruled out lymphoma. Her R orbital swelling is improved but still present. After her biopsy I contacted her to schedule a f/u visit with me to discuss plan of care but she declined till today's visit.    She did not tolerate AZA because of GI SEs.    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     This is definitely an inflammatory process and is steroid responsive, she had local kenalog inj in 8/2017 at the time of biopsy which has helped. Given her diabetes and long term SE of prednisone, highly recommend steroid sparing agent to prevent progression/recurrence of R campos-orbital swelling. Since she did not tolerate MTX and AZA, recommend rituximab as next step.     In 2/2018, I spent 30 minutes discussing test results, plan of care, rituximab SEs including rare risk of PML. She declined rituximab with concern for SEs.    Unfortunately lost f/u sine 2/2018. In 9/2018, was back with her R eye being much worse, swelling around R orbit was severe and is pushing down her eye. She decided to see an ophthalmologist at [place], was  seen 8/29, was told to have GPA.    Janine is frustrated with her eye condition, saying nobody told her that she has GPA or Wegener's which is a serious condition and people could die from it.    I reminded her that I discussed the Dx of ANCA-vasculitis which is just another name for Wegener's with her many times but she always declined induction Tx rituximab at last visit in 9/2018. I put her on prednisone 30 mg every day in 9/2018, now she is off, stopped 6 wk after surgery. Does not like SEs including worsening BG.    CT chest/abdomen/pelvis 4/2017 was neg for malignancy. 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 Repeat MPO was positive in 6/2017.    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 Shah and Dr. Valdez at [place]. 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 Not happy with results, on my exam, her eye swelling/pain significantly improved. She wants to transfer care to here, I advised her to make an appointment with Dr. Saulo GREEN for f/u and referred her to Dr. Vasquez to assess if she has sinus involvement by GPA given facial pain.    After my original recommendation to receive rituximab (FDA approved for GPA) in 2/2018. She finally agreed to it, had 1 gr q2wk x 2 on 12/12/2018 and 12/26/2018. Had minor allergic reaction which was managed by extra dose of steroid and benadryl. She refuses to do prednisone taper given h/o diabetes, GIB on prednisone. I told her it would take 1 mo for rituximab to show benefit, if she continues to have active disease, recommend trial of cytoxan.     Patient received Rituxan again (6/3/19 & 6/17/19) 6 months after first round and last on 12/5/2019 and 12/19/2019. Repeat CT scan show continued inflammatory changes in the R orbit, but decreased proptosis. We discussed cytoxan again but she said it is out of question and she declined considering it. Therefore, we continued with q6mo rituximab with hope that inflammation goes down. Patient is not on  any prednisone. 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 Per eye exam 1/2020, responding to rituximab, no longer has proptosis, will continue with rituximab. Last rituximab was 1 gr on 6/18/2020, next is due on 7/2/2020.    Stable GPA. Stable labs.    Today's plan:    Return in late 9/2020 or early 10/2020 to discuss next dose of rituximab (very slow to avoid reaction)     mg bid with annual eye exam    Follow up with Dr. Vernon     Lidocaine cream and ketoprofen powder for knee pain      Continue accupuncture (referral was placed as it helps with chronic pain).     My impression is that her arthralgias are a combination of IA, fibromyalgia and chronic pain.  Stopped HCQ at last visit, shortly after resumed taking it as arthralgia recurred. Continue HCQ. Eye exam is updated.    2-Fad pads. She was seen by endocrinology and cushing was ruled out in 4/2014. Was advised to do f/u for enlarged thyroid/thyroid nodules.    3-Hair loss. F/U with dermatology    4-Chronic pain. F/U with pain clinic    5-Concerns of subclinical hyperthyroidism: TSH is barely minimal, chart review shows that those lowest levels are since April 2014. At last visit, discussed Endocrinology ref which pt wants to hold off in this visit.     6-Vit D deficiency. Was replaced with vit D 50, 000 units po qwk x 12 wk then 2000 units every day    7-Upper extremity swelling. Recent mammogram without suggestion of malignancy. No LAD of axillary region. Arms appear normal on physical exam, however patient reports intermittent swelling.  - Referral to lymphedema clinic was done in the past.      PLAN: Return in 3-4 months       MEDICATION CHANGES: as above          Phone visit: 15 min    Majo Mckinnon MD      Orders Placed This Encounter   Procedures     AST     ALT     Myeloperoxidase and Proteinase 3 Panel     Albumin level     CBC with platelets differential     Creatinine     CRP inflammation     UA with Microscopic  "reflex to Culture     Protein  random urine with Creat Ratio     Creatinine random urine     Erythrocyte sedimentation rate auto     CD19 B Cell Count     ANCA IgG by IFA with Reflex to Titer     Immunoglobulins A G and M         Janine Cornell is a 55 year old female who is being evaluated via a billable telephone visit.      The patient has been notified of following:     \"This telephone visit will be conducted via a call between you and your physician/provider. We have found that certain health care needs can be provided without the need for a physical exam.  This service lets us provide the care you need with a short phone conversation.  If a prescription is necessary we can send it directly to your pharmacy.  If lab work is needed we can place an order for that and you can then stop by our lab to have the test done at a later time.    Telephone visits are billed at different rates depending on your insurance coverage. During this emergency period, for some insurers they may be billed the same as an in-person visit.  Please reach out to your insurance provider with any questions.    If during the course of the call the physician/provider feels a telephone visit is not appropriate, you will not be charged for this service.\"    Patient has given verbal consent for Telephone visit?  Yes    What phone number would you like to be contacted at? [phone]    How would you like to obtain your AVS? Mail a copy    Phone call duration: 15 minutes    Majo Mckinnon MD      If you call pt first time and doesn't answer, please call back.  Mei Hawkins CMA  7/1/2020 2:36 PM      "

## 2020-07-02 ENCOUNTER — TELEPHONE (OUTPATIENT)
Dept: CARDIOLOGY | Facility: CLINIC | Age: 55
End: 2020-07-02

## 2020-07-02 ENCOUNTER — INFUSION THERAPY VISIT (OUTPATIENT)
Dept: INFUSION THERAPY | Facility: CLINIC | Age: 55
End: 2020-07-02
Attending: INTERNAL MEDICINE
Payer: COMMERCIAL

## 2020-07-02 VITALS
OXYGEN SATURATION: 99 % | WEIGHT: 162.7 LBS | SYSTOLIC BLOOD PRESSURE: 104 MMHG | RESPIRATION RATE: 16 BRPM | TEMPERATURE: 98.6 F | BODY MASS INDEX: 28.82 KG/M2 | HEART RATE: 90 BPM | DIASTOLIC BLOOD PRESSURE: 66 MMHG

## 2020-07-02 DIAGNOSIS — I21.4 NSTEMI (NON-ST ELEVATED MYOCARDIAL INFARCTION) (H): ICD-10-CM

## 2020-07-02 DIAGNOSIS — M31.30 GRANULOMATOSIS WITH POLYANGIITIS WITHOUT RENAL INVOLVEMENT (H): ICD-10-CM

## 2020-07-02 DIAGNOSIS — I25.10 CORONARY ARTERY DISEASE INVOLVING NATIVE HEART WITHOUT ANGINA PECTORIS, UNSPECIFIED VESSEL OR LESION TYPE: ICD-10-CM

## 2020-07-02 DIAGNOSIS — I10 HYPERTENSION, UNSPECIFIED TYPE: ICD-10-CM

## 2020-07-02 DIAGNOSIS — I77.82 ANCA-ASSOCIATED VASCULITIS (H): ICD-10-CM

## 2020-07-02 DIAGNOSIS — I10 HYPERTENSION GOAL BP (BLOOD PRESSURE) < 140/90: Primary | ICD-10-CM

## 2020-07-02 DIAGNOSIS — I10 HYPERTENSION GOAL BP (BLOOD PRESSURE) < 140/90: ICD-10-CM

## 2020-07-02 DIAGNOSIS — I10 BENIGN ESSENTIAL HYPERTENSION: ICD-10-CM

## 2020-07-02 LAB
ALBUMIN SERPL-MCNC: 4.4 G/DL (ref 3.4–5)
ALP SERPL-CCNC: 86 U/L (ref 40–150)
ALT SERPL W P-5'-P-CCNC: 34 U/L (ref 0–50)
ANION GAP SERPL CALCULATED.3IONS-SCNC: 8 MMOL/L (ref 3–14)
AST SERPL W P-5'-P-CCNC: 23 U/L (ref 0–45)
BILIRUB SERPL-MCNC: 0.4 MG/DL (ref 0.2–1.3)
BUN SERPL-MCNC: 35 MG/DL (ref 7–30)
CALCIUM SERPL-MCNC: 9.5 MG/DL (ref 8.5–10.1)
CHLORIDE SERPL-SCNC: 105 MMOL/L (ref 94–109)
CO2 SERPL-SCNC: 25 MMOL/L (ref 20–32)
CREAT SERPL-MCNC: 1.47 MG/DL (ref 0.52–1.04)
GFR SERPL CREATININE-BSD FRML MDRD: 40 ML/MIN/{1.73_M2}
GLUCOSE SERPL-MCNC: 150 MG/DL (ref 70–99)
POTASSIUM SERPL-SCNC: 3.3 MMOL/L (ref 3.4–5.3)
PROT SERPL-MCNC: 8 G/DL (ref 6.8–8.8)
SODIUM SERPL-SCNC: 138 MMOL/L (ref 133–144)

## 2020-07-02 PROCEDURE — 96375 TX/PRO/DX INJ NEW DRUG ADDON: CPT

## 2020-07-02 PROCEDURE — 80053 COMPREHEN METABOLIC PANEL: CPT | Performed by: INTERNAL MEDICINE

## 2020-07-02 PROCEDURE — 25000128 H RX IP 250 OP 636: Mod: ZF | Performed by: INTERNAL MEDICINE

## 2020-07-02 PROCEDURE — 25800030 ZZH RX IP 258 OP 636: Mod: ZF | Performed by: INTERNAL MEDICINE

## 2020-07-02 PROCEDURE — 96413 CHEMO IV INFUSION 1 HR: CPT

## 2020-07-02 PROCEDURE — 96415 CHEMO IV INFUSION ADDL HR: CPT

## 2020-07-02 PROCEDURE — 96367 TX/PROPH/DG ADDL SEQ IV INF: CPT

## 2020-07-02 PROCEDURE — 25000132 ZZH RX MED GY IP 250 OP 250 PS 637: Mod: ZF | Performed by: INTERNAL MEDICINE

## 2020-07-02 RX ORDER — ACETAMINOPHEN 325 MG/1
650 TABLET ORAL ONCE
Status: CANCELLED
Start: 2020-07-02

## 2020-07-02 RX ORDER — METHYLPREDNISOLONE SODIUM SUCCINATE 125 MG/2ML
125 INJECTION, POWDER, LYOPHILIZED, FOR SOLUTION INTRAMUSCULAR; INTRAVENOUS ONCE
Status: CANCELLED | OUTPATIENT
Start: 2020-07-02

## 2020-07-02 RX ORDER — METHYLPREDNISOLONE SODIUM SUCCINATE 125 MG/2ML
125 INJECTION, POWDER, LYOPHILIZED, FOR SOLUTION INTRAMUSCULAR; INTRAVENOUS ONCE
Status: COMPLETED | OUTPATIENT
Start: 2020-07-02 | End: 2020-07-02

## 2020-07-02 RX ORDER — ACETAMINOPHEN 325 MG/1
650 TABLET ORAL ONCE
Status: COMPLETED | OUTPATIENT
Start: 2020-07-02 | End: 2020-07-02

## 2020-07-02 RX ADMIN — METHYLPREDNISOLONE SODIUM SUCCINATE 125 MG: 125 INJECTION, POWDER, FOR SOLUTION INTRAMUSCULAR; INTRAVENOUS at 08:48

## 2020-07-02 RX ADMIN — DIPHENHYDRAMINE HYDROCHLORIDE 50 MG: 50 INJECTION, SOLUTION INTRAMUSCULAR; INTRAVENOUS at 08:51

## 2020-07-02 RX ADMIN — ACETAMINOPHEN 650 MG: 325 TABLET ORAL at 08:47

## 2020-07-02 RX ADMIN — RITUXIMAB 1000 MG: 10 INJECTION, SOLUTION INTRAVENOUS at 09:25

## 2020-07-02 ASSESSMENT — PAIN SCALES - GENERAL: PAINLEVEL: MILD PAIN (3)

## 2020-07-02 NOTE — PATIENT INSTRUCTIONS
Dear Janine Cornell    Thank you for choosing [facility] ([facility]) for your infusion.  The following information is a summary of our appointment as well as important reminders.      EDUCATION POST BIOLOGICAL/CHEMOTHERAPY INFUSION  Call the triage nurse at your clinic or seek medical attention if you have chills and/or temperature greater than or equal to 100.5, uncontrolled nausea/vomiting, diarrhea, constipation, dizziness, shortness of breath, chest pain, heart palpitations, weakness or any other new or concerning symptoms, questions or concerns.  You can not have any live virus vaccines prior to or during treatment or up to 6 months post infusion.  If you have an upcoming surgery, medical procedure or dental procedure during treatment, this should be discussed with your ordering physician and your surgeon/dentist.  If you are having any concerning symptom, if you are unsure if you should get your next infusion or wish to speak to a provider before your next infusion, please call your care coordinator or triage nurse at your clinic to notify them so we can adequately serve you.    Patient Education     Rituximab Solution for injection  What is this medicine?  RITUXIMAB (ri TUX i mab) is a monoclonal antibody. This medicine changes the way the body's immune system works. It is used commonly to treat non-Hodgkin lymphoma and other conditions. In cancer cells, this drug targets a specific protein within cancer cells and stops the cancer cells from growing. It is also used to treat rheumatoid arthritis (RA). In RA, this medicine slows the inflammatory process and help reduce joint pain and swelling. This medicine is often used with other cancer or arthritis medications.  This medicine may be used for other purposes; ask your health care provider or pharmacist if you have questions.  What should I tell my health care provider before I take this medicine?  They  need to know if you have any of these conditions:    blood disorders    heart disease    history of hepatitis B    infection (especially a virus infection such as chickenpox, cold sores, or herpes)    irregular heartbeat    kidney disease    lung or breathing disease, like asthma    lupus    an unusual or allergic reaction to rituximab, mouse proteins, other medicines, foods, dyes, or preservatives    pregnant or trying to get pregnant    breast-feeding  How should I use this medicine?  This medicine is for infusion into a vein. It is administered in a hospital or clinic by a specially trained health care professional.  A special MedGuide will be given to you by the pharmacist with each prescription and refill. Be sure to read this information carefully each time.  Talk to your pediatrician regarding the use of this medicine in children. This medicine is not approved for use in children.  Overdosage: If you think you have taken too much of this medicine contact a poison control center or emergency room at once.  NOTE: This medicine is only for you. Do not share this medicine with others.  What if I miss a dose?  It is important not to miss a dose. Call your doctor or health care professional if you are unable to keep an appointment.  What may interact with this medicine?    cisplatin    medicines for blood pressure    some other medicines for arthritis    vaccines  This list may not describe all possible interactions. Give your health care provider a list of all the medicines, herbs, non-prescription drugs, or dietary supplements you use. Also tell them if you smoke, drink alcohol, or use illegal drugs. Some items may interact with your medicine.  What should I watch for while using this medicine?  Report any side effects that you notice during your treatment right away, such as changes in your breathing, fever, chills, dizziness or lightheadedness. These effects are more common with the first dose.  Visit your  prescriber or health care professional for checks on your progress. You will need to have regular blood work. Report any other side effects. The side effects of this medicine can continue after you finish your treatment. Continue your course of treatment even though you feel ill unless your doctor tells you to stop.  Call your doctor or health care professional for advice if you get a fever, chills or sore throat, or other symptoms of a cold or flu. Do not treat yourself. This drug decreases your body's ability to fight infections. Try to avoid being around people who are sick.  This medicine may increase your risk to bruise or bleed. Call your doctor or health care professional if you notice any unusual bleeding.  Be careful brushing and flossing your teeth or using a toothpick because you may get an infection or bleed more easily. If you have any dental work done, tell your dentist you are receiving this medicine.  Avoid taking products that contain aspirin, acetaminophen, ibuprofen, naproxen, or ketoprofen unless instructed by your doctor. These medicines may hide a fever.  Do not become pregnant while taking this medicine. Women should inform their doctor if they wish to become pregnant or think they might be pregnant. There is a potential for serious side effects to an unborn child. Talk to your health care professional or pharmacist for more information. Do not breast-feed an infant while taking this medicine.  What side effects may I notice from receiving this medicine?  Side effects that you should report to your doctor or health care professional as soon as possible:    allergic reactions like skin rash, itching or hives, swelling of the face, lips, or tongue    low blood counts - this medicine may decrease the number of white blood cells, red blood cells and platelets. You may be at increased risk for infections and bleeding.    signs of infection - fever or chills, cough, sore throat, pain or difficulty  passing urine    signs of decreased platelets or bleeding - bruising, pinpoint red spots on the skin, black, tarry stools, blood in the urine    signs of decreased red blood cells - unusually weak or tired, fainting spells, lightheadedness    breathing problems    confused, not responsive    chest pain    fast, irregular heartbeat    feeling faint or lightheaded, falls    mouth sores    redness, blistering, peeling or loosening of the skin, including inside the mouth    stomach pain    swelling of the ankles, feet, or hands    trouble passing urine or change in the amount of urine  Side effects that usually do not require medical attention (report to your doctor or other health care professional if they continue or are bothersome):    anxiety    headache    loss of appetite    muscle aches    nausea    night sweats  This list may not describe all possible side effects. Call your doctor for medical advice about side effects. You may report side effects to FDA at 7-041-XID-1801.  Where should I keep my medicine?  This drug is given in a hospital or clinic and will not be stored at home.  NOTE:This sheet is a summary. It may not cover all possible information. If you have questions about this medicine, talk to your doctor, pharmacist, or health care provider. Copyright  2016 Gold Standard         We look forward in seeing you on your next appointment here at [facility] ([facility]).  Please don t hesitate to call us at [phone] to reschedule any of your appointments or to speak with one of the [facility] registered nurses.  It was a pleasure taking care of you today.    Sincerely,    [facility]  [address] [place] [address]  [address]  Phone:  [phone]

## 2020-07-02 NOTE — LETTER
7/2/2020         RE: Janine Cornell  [address] [facility] [address]        Dear Colleague,    Thank you for referring your patient, Janine Cornell, to the [facility] [place] [facility]. Please see a copy of my visit note below.    Nursing Note  Janine Cornell [facility] for:   Chief Complaint   Patient presents with     Infusion     Rituxan     During today's [facility] appointment, orders from Dr. Majo Mckinnon were completed.  Frequency: every 2 weeks, dose 2/2    Progress note:  Patient identification verified by name and date of birth.  Assessment completed.  Vitals recorded in Doc Flowsheets.  Patient was provided with education regarding medication/procedure and possible side effects.  Patient verbalized understanding.     present during visit today: Not Applicable.    Patient stated she experiences fatigue for a few days after last infusion and dizziness but she could not tell RN if this was specific to the last infusion or if it happens after most (she could not remember). After reviewing previous RN notes it seems like it happens after most infusions for patient.     Treatment Conditions: ~~~ NOTE: If the patient answers yes to any of the questions below, hold the infusion and contact ordering provider or on-call provider.    1. Have you recently had an elevated temperature, fever, chills, productive cough, coughing for 3 weeks or longer or hemoptysis, abnormal vital signs, night sweats,  chest pain or have you noticed a decrease in your appetite, unexplained weight loss or fatigue? No, not outside patient's baseline  2. Do you have any open wounds or new incisions? No  3. Do you have any recent or upcoming hospitalizations, surgeries or dental procedures? No  4. Do you currently have or recently have had any signs of illness or infection or are you on any antibiotics?  No  5. Have you had any new, sudden or worsening abdominal pain? Yes, about a month. radiates to back, worses when drink and eat. constant pain.  --Dr. Mckinnon paged. Ok to go ahead with Rituxan today.   6. Have you or anyone in your household received a live vaccination in the past 4 weeks? Please note:  No live vaccines while on biologic/chemotherapy until 6 months after the last treatment.  Patient can receive the flu vaccine (shot only) and the pneumovax.  It is optimal for the patient to get these vaccines mid cycle, but they can be given at any time as long as it is not on the day of the infusion. No  7. Have you recently been diagnosed with any new nervous system diseases (ie. Multiple sclerosis, Guillain [place], seizures, neurological changes) or cancer diagnosis? No  8. Are you on any form of radiation or chemotherapy? No  9. Are you pregnant or breast feeding or do you have plans of pregnancy in the future? No  10. Have you been having any signs of worsening depression or suicidal ideations?  (benlysta only) No  11. Have there been any other new onset medical symptoms? No      Premedications: administered per order.    Drug Waste Record: No    Infusion length and rate:  infusion starts at 50 ml/hr, then increased by 50 ml/hr every 30 minutes to final rate of 200 ml/hr. Patient states she does not tolerate higher dosages    Labs: were drawn per orders.     Vascular access: peripheral IV placed today.    Patient's BP monitored throughout infusion per patient request as patient was concerned about it dropping throughout as she felt lightheaded after she got home from the last infusion. Patient's BP remained consistent throughout. She was concerned about it being low but it was WNL. Told to follow up with cardiologist if concerned.     POST-INFUSION OF BIOLOGICAL MEDICATION:     Reviewed with patient.  Given biologic medication or medication hand-out. Inform patient if any fever, chills or signs of infection,  new symptoms, abdominal pain, heart palpitations, shortness of breath, reaction, weakness, neurological changes, seek medical attention immediately and should not receive infusions. No live virus vaccines prior to or during treatment or up to 6 months post infusion. If the patient has an upcoming procedure or surgery, this should be discussed with the rheumatologist and surgeon or provider.    Post Infusion Assessment:  Patient tolerated infusion without incident.  Blood return noted pre and post infusion.  Site patent and intact, free from redness, edema or discomfort.  No evidence of extravasations.     Discharge Plan:   Follow up plan of care with: ongoing infusions at [facility].  Discharge instructions were reviewed with patient. AVS printed and given to patient.   Patient/representative verbalized understanding of discharge instructions and all questions answered.  Patient discharged from [facility] in stable condition.    Charisse Martell RN       Administrations This Visit     acetaminophen (TYLENOL) tablet 650 mg     Admin Date  07/02/2020 Action  Given Dose  650 mg Route  Oral Administered By  Charisse Martell RN          diphenhydrAMINE (BENADRYL) 50 mg in sodium chloride 0.9 % 56 mL intermittent infusion     Admin Date  07/02/2020 Action  New Bag Dose  50 mg Rate  224 mL/hr Route  Intravenous Administered By  Charisse Martell RN          methylPREDNISolone sodium succinate (solu-MEDROL) injection 125 mg     Admin Date  07/02/2020 Action  Given Dose  125 mg Route  Intravenous Administered By  Charisse Martell RN          riTUXimab (RITUXAN) 1,000 mg in sodium chloride 0.9 % 1,000 mL NON-oncology use     Admin Date  07/02/2020 Action  New Bag Dose  1000 mg Route  Intravenous Administered By  Charisse Martell RN                /69   Pulse 90   Temp 98.6  F (37  C) (Oral)   Wt 73.8 kg (162 lb 11.2 oz)   SpO2 99%   BMI 28.82 kg/m        Again,  thank you for allowing me to participate in the care of your patient.        Sincerely,        [facility]

## 2020-07-02 NOTE — TELEPHONE ENCOUNTER
Pt was in [facility] for infusion appt and came to cardiology clinic to get AVS.   She had questions on medications so clinic coordinator, Jane, contacted writer.    Spoke with Pt.  He noted cutting cole 25 mg tabs in half is too difficult as the pills crumble.  Discussed 25 mg is the smallest dosage.    Pt also noted at her last two infusions her BPs remained low at 95/61 and today 104/66.    Informed Pt the above would be discussed with Dr Peace for recommendations on med changes.  Pt requested a call from writer tomorrow to discuss recommendations.    Pt verbalized understanding, agreed to current plan and denied any further questions.    Selena Underwood LPN

## 2020-07-02 NOTE — PROGRESS NOTES
Nursing Note  Janine Cornell presents [facility] for:   Chief Complaint   Patient presents with     Infusion     Rituxan     During today's [facility] appointment, orders from Dr. Majo Mckinnon were completed.  Frequency: every 2 weeks, dose 2/2    Progress note:  Patient identification verified by name and date of birth.  Assessment completed.  Vitals recorded in Doc Flowsheets.  Patient was provided with education regarding medication/procedure and possible side effects.  Patient verbalized understanding.     present during visit today: Not Applicable.    Patient stated she experiences fatigue for a few days after last infusion and dizziness but she could not tell RN if this was specific to the last infusion or if it happens after most (she could not remember). After reviewing previous RN notes it seems like it happens after most infusions for patient.     Treatment Conditions: ~~~ NOTE: If the patient answers yes to any of the questions below, hold the infusion and contact ordering provider or on-call provider.    1. Have you recently had an elevated temperature, fever, chills, productive cough, coughing for 3 weeks or longer or hemoptysis, abnormal vital signs, night sweats,  chest pain or have you noticed a decrease in your appetite, unexplained weight loss or fatigue? No, not outside patient's baseline  2. Do you have any open wounds or new incisions? No  3. Do you have any recent or upcoming hospitalizations, surgeries or dental procedures? No  4. Do you currently have or recently have had any signs of illness or infection or are you on any antibiotics? No  5. Have you had any new, sudden or worsening abdominal pain? Yes, about a month. radiates to back, worses when drink and eat. constant pain.  --Dr. Mckinnon paged. Ok to go ahead with Rituxan today.   6. Have you or anyone in your household received a live vaccination in the past  4 weeks? Please note:  No live vaccines while on biologic/chemotherapy until 6 months after the last treatment.  Patient can receive the flu vaccine (shot only) and the pneumovax.  It is optimal for the patient to get these vaccines mid cycle, but they can be given at any time as long as it is not on the day of the infusion. No  7. Have you recently been diagnosed with any new nervous system diseases (ie. Multiple sclerosis, Guillain [place], seizures, neurological changes) or cancer diagnosis? No  8. Are you on any form of radiation or chemotherapy? No  9. Are you pregnant or breast feeding or do you have plans of pregnancy in the future? No  10. Have you been having any signs of worsening depression or suicidal ideations?  (benlysta only) No  11. Have there been any other new onset medical symptoms? No      Premedications: administered per order.    Drug Waste Record: No    Infusion length and rate:  infusion starts at 50 ml/hr, then increased by 50 ml/hr every 30 minutes to final rate of 200 ml/hr. Patient states she does not tolerate higher dosages    Labs: were drawn per orders.     Vascular access: peripheral IV placed today.    Patient's BP monitored throughout infusion per patient request as patient was concerned about it dropping throughout as she felt lightheaded after she got home from the last infusion. Patient's BP remained consistent throughout. She was concerned about it being low but it was WNL. Told to follow up with cardiologist if concerned.     POST-INFUSION OF BIOLOGICAL MEDICATION:     Reviewed with patient.  Given biologic medication or medication hand-out. Inform patient if any fever, chills or signs of infection, new symptoms, abdominal pain, heart palpitations, shortness of breath, reaction, weakness, neurological changes, seek medical attention immediately and should not receive infusions. No live virus vaccines prior to or during treatment or up to 6 months post infusion. If the patient has  an upcoming procedure or surgery, this should be discussed with the rheumatologist and surgeon or provider.    Post Infusion Assessment:  Patient tolerated infusion without incident.  Blood return noted pre and post infusion.  Site patent and intact, free from redness, edema or discomfort.  No evidence of extravasations.     Discharge Plan:   Follow up plan of care with: ongoing infusions at [facility].  Discharge instructions were reviewed with patient. AVS printed and given to patient.   Patient/representative verbalized understanding of discharge instructions and all questions answered.  Patient discharged from [facility] in stable condition.    Charisse Martell RN       Administrations This Visit     acetaminophen (TYLENOL) tablet 650 mg     Admin Date  07/02/2020 Action  Given Dose  650 mg Route  Oral Administered By  Charisse Martell RN          diphenhydrAMINE (BENADRYL) 50 mg in sodium chloride 0.9 % 56 mL intermittent infusion     Admin Date  07/02/2020 Action  New Bag Dose  50 mg Rate  224 mL/hr Route  Intravenous Administered By  Charisse Martell RN          methylPREDNISolone sodium succinate (solu-MEDROL) injection 125 mg     Admin Date  07/02/2020 Action  Given Dose  125 mg Route  Intravenous Administered By  Charisse Martell RN          riTUXimab (RITUXAN) 1,000 mg in sodium chloride 0.9 % 1,000 mL NON-oncology use     Admin Date  07/02/2020 Action  New Bag Dose  1000 mg Route  Intravenous Administered By  Charisse Martell RN                /69   Pulse 90   Temp 98.6  F (37  C) (Oral)   Wt 73.8 kg (162 lb 11.2 oz)   SpO2 99%   BMI 28.82 kg/m

## 2020-07-05 RX ORDER — ASPIRIN 81 MG/1
TABLET, COATED ORAL
Qty: 30 TABLET | Refills: 11 | Status: SHIPPED | OUTPATIENT
Start: 2020-07-05 | End: 2021-07-13

## 2020-07-06 RX ORDER — LISINOPRIL 10 MG/1
10 TABLET ORAL DAILY
Qty: 30 TABLET | Refills: 11 | Status: SHIPPED | OUTPATIENT
Start: 2020-07-06 | End: 2020-07-27

## 2020-07-06 RX ORDER — CLOPIDOGREL BISULFATE 75 MG/1
75 TABLET ORAL DAILY
Qty: 30 TABLET | Refills: 11 | Status: SHIPPED | OUTPATIENT
Start: 2020-07-06 | End: 2021-07-13

## 2020-07-06 RX ORDER — METOPROLOL SUCCINATE 100 MG/1
100 TABLET, EXTENDED RELEASE ORAL DAILY
Qty: 30 TABLET | Refills: 11 | Status: SHIPPED | OUTPATIENT
Start: 2020-07-06 | End: 2021-07-13

## 2020-07-06 RX ORDER — SPIRONOLACTONE 25 MG/1
25 TABLET ORAL DAILY
Qty: 30 TABLET | Refills: 11 | Status: SHIPPED | OUTPATIENT
Start: 2020-07-06 | End: 2021-07-13

## 2020-07-06 RX ORDER — PRAVASTATIN SODIUM 40 MG
40 TABLET ORAL DAILY
Qty: 30 TABLET | Refills: 11 | Status: SHIPPED | OUTPATIENT
Start: 2020-07-06 | End: 2021-07-13

## 2020-07-06 RX ORDER — NITROGLYCERIN 0.4 MG/1
0.4 TABLET SUBLINGUAL EVERY 5 MIN PRN
Qty: 25 TABLET | Refills: 3 | Status: SHIPPED | OUTPATIENT
Start: 2020-07-06 | End: 2022-07-07

## 2020-07-06 NOTE — TELEPHONE ENCOUNTER
Date: 7/6/2020    Time of Call: 5:49 PM     Diagnosis:  HTN     [ TORB ] Ordering provider: Dr Milan Peace  Order:   - Stop Lisinopril-hydrochlorothiazide  - Restart Spironolactone 25 mg every day  - Start Lisinopril 10 mg every day      Order received by: Selena Underwood LPN     Follow-up/additional notes: Per routing comment.  Called and spoke with Pt regarding recommendations.  Discussed ASA recommendations.  Orders placed.    Pt verbalized understanding, agreed to current plan and denied any further questions.

## 2020-07-24 DIAGNOSIS — M35.9 UNDIFFERENTIATED CONNECTIVE TISSUE DISEASE (H): ICD-10-CM

## 2020-07-24 NOTE — TELEPHONE ENCOUNTER
Medication: tramadol 50mg  Last Office Visit Date: 7/1/2020  Future Office Visit Date: none scheduled  Last Prescription Fill Date: 5/4/2020  Last Fill Quantity: #60     reviewed as follows:      Request sent to provider for review and signature.    Beulah Fortune CMA   7/24/2020 10:01 AM

## 2020-07-25 RX ORDER — TRAMADOL HYDROCHLORIDE 50 MG/1
50 TABLET ORAL EVERY 8 HOURS PRN
Qty: 60 TABLET | Refills: 3 | Status: SHIPPED | OUTPATIENT
Start: 2020-07-25 | End: 2020-12-30

## 2020-07-27 ENCOUNTER — TELEPHONE (OUTPATIENT)
Dept: CARDIOLOGY | Facility: CLINIC | Age: 55
End: 2020-07-27

## 2020-07-27 DIAGNOSIS — I10 HYPERTENSION GOAL BP (BLOOD PRESSURE) < 140/90: Primary | ICD-10-CM

## 2020-07-27 NOTE — TELEPHONE ENCOUNTER
Date: 7/27/2020    Time of Call: 3:31 PM     Diagnosis: Bilat LE Edema, HTN     [ TORB ] Ordering provider: Dr Milan Peace  Order:   - Continue Spironolactone  - Continue Lisinopril-hydrochlorothiazide 20-25 as you restarted last week  - Stop lisinopril   - CMP 1 week     Order received by: Selena Underwood LPN     Follow-up/additional notes: Per telephone call.  Called and spoke with Pt.  Discussed recommendations to remain at changes she had made last week and monitor and record BP bid and also obtain CMP.  Noted writer would contact Pt 07/31 to obtain readings.

## 2020-07-27 NOTE — TELEPHONE ENCOUNTER
[facility] Call Center    Phone Message    May a detailed message be left on voicemail: yes     Reason for Call: Other: Pt calling in she stated since her last visit with Dr Peace she has gained weight is having horrible swelling and pain in her legs and feet, headaches and high blood pressure. She is wondering if this is from her medication changes Dr Peace made. Please call back to discuss further, thank you!      Action Taken: Message routed to:  [facility] cardio     Travel Screening: Not Applicable

## 2020-07-27 NOTE — TELEPHONE ENCOUNTER
Called and spoke with Pt.  She noted she has not frequently been checking her weights, but since her visit with Dr Peace she noted her clothes have been fitting tighter and she has had swelling bilaterally in her feet, ankles and leg up to her knees.  She noted her socks are leaving significant indents.      Pt decided to take home weight yesterday and reported it was 179.4#.  The previous weight Pt recorded was from 07/02 and was 161#.    Pt denied SOB, with the exception of her asthma flaring up occassionally.  Pt also noted the past few weeks she has woke up with a headache.  She reported she occasionally will also get headaches in the afternoons/early evenings as well.  Pt noted for the last week she has been monitoring her BP bid d/t her headaches and noted her readings have been elevated.  SBP ranging from 138-160 mmHg, to DBP at 78-94.  A couple recent readings provided were 156/93 and 148/90.    Pt informed the above would be shared with Dr Peace; however, he is currently attending.  Pt will be contacted with recommendations.  Pt verbalized understanding, agreed to current plan and denied any further questions.    Selena Underwood LPN

## 2020-08-04 NOTE — TELEPHONE ENCOUNTER
Called and left  for Pt requesting a return call to clinic to provide home BPs and pulse readings.  Clinic telephone provided.    Selena Underwood LPN

## 2020-08-31 DIAGNOSIS — I10 HYPERTENSION, UNSPECIFIED TYPE: ICD-10-CM

## 2020-09-02 DIAGNOSIS — M19.90 INFLAMMATORY ARTHRITIS: ICD-10-CM

## 2020-09-04 RX ORDER — HYDROXYCHLOROQUINE SULFATE 200 MG/1
400 TABLET, FILM COATED ORAL DAILY
Qty: 180 TABLET | Refills: 1 | Status: SHIPPED | OUTPATIENT
Start: 2020-09-04 | End: 2021-01-17

## 2020-09-04 RX ORDER — LISINOPRIL AND HYDROCHLOROTHIAZIDE 20; 25 MG/1; MG/1
1 TABLET ORAL DAILY
Qty: 30 TABLET | Refills: 11 | OUTPATIENT
Start: 2020-09-04

## 2020-09-04 NOTE — TELEPHONE ENCOUNTER
lisinopril-hydrochlorothiazide (PRINZIDE/ZESTORETIC) 20-25 MG tablet   Last Written Prescription Date:  7/11/19  Last Fill Quantity: 30,   # refills: 11  Last Office Visit : 6/22/20  Future Office visit: none        med end date 7/6/20. Refused. Stop Lisinopril-hydrochlorothiazide  - Restart Spironolactone 25 mg every day

## 2020-09-04 NOTE — TELEPHONE ENCOUNTER
hydroxychloroquine (PLAQUENIL) 200 MG tablet   Plaquenil      Last Written Prescription Date:  6/4/2020  Last Fill Quantity: 180,   # refills: 0  Last Office Visit: 7/1/2020  Future Office visit: None  Last Eye Exam:1/6/2020  180 Tabs, 1 Refill sent to hakna Mercedes RN  Central Triage Red Flags/Med Refills]

## 2020-09-09 DIAGNOSIS — I10 HYPERTENSION, UNSPECIFIED TYPE: ICD-10-CM

## 2020-09-13 RX ORDER — LISINOPRIL AND HYDROCHLOROTHIAZIDE 20; 25 MG/1; MG/1
1 TABLET ORAL DAILY
Qty: 30 TABLET | Refills: 11 | OUTPATIENT
Start: 2020-09-13

## 2020-09-16 ENCOUNTER — TELEPHONE (OUTPATIENT)
Dept: RHEUMATOLOGY | Facility: CLINIC | Age: 55
End: 2020-09-16

## 2020-09-16 NOTE — TELEPHONE ENCOUNTER
Patient left a message on my voicemail regarding a form that she needs to have filled out.    Call placed to patient returning her call with no success. Left a message for her to call back to discuss form.    Beulah Fortune CMA   9/16/2020 2:11 PM

## 2020-10-22 ENCOUNTER — TELEPHONE (OUTPATIENT)
Dept: RHEUMATOLOGY | Facility: CLINIC | Age: 55
End: 2020-10-22

## 2020-10-22 NOTE — TELEPHONE ENCOUNTER
Pt has been given a follow up appt or 1/5/2021, but she is wondering if she is good to get her next dose of Rituximab.  Will send message to Dr. Mckinnon to see what is needed.    Desiree Godinez RN  [facility]

## 2020-10-23 NOTE — TELEPHONE ENCOUNTER
Called and spoke with pt, she will call and make an appt to have labs done next week. Will follow up with her after labs are completed.    Desiree Godinez RN  [facility]

## 2020-10-23 NOTE — TELEPHONE ENCOUNTER
Discussed with Dr. Mckinnon, pt needs to have labs completed before ordering Rituximab.    Left message requesting that she get labs completed.    Desiree Godinez RN  [facility]

## 2020-10-27 ENCOUNTER — ANCILLARY PROCEDURE (OUTPATIENT)
Dept: MAMMOGRAPHY | Facility: CLINIC | Age: 55
End: 2020-10-27
Attending: FAMILY MEDICINE
Payer: COMMERCIAL

## 2020-10-27 ENCOUNTER — TELEPHONE (OUTPATIENT)
Dept: RHEUMATOLOGY | Facility: CLINIC | Age: 55
End: 2020-10-27

## 2020-10-27 DIAGNOSIS — Z12.31 SCREENING MAMMOGRAM, ENCOUNTER FOR: ICD-10-CM

## 2020-10-27 DIAGNOSIS — M31.30 GRANULOMATOSIS WITH POLYANGIITIS, UNSPECIFIED WHETHER RENAL INVOLVEMENT (H): Primary | ICD-10-CM

## 2020-10-27 DIAGNOSIS — M31.30 GRANULOMATOSIS WITH POLYANGIITIS, UNSPECIFIED WHETHER RENAL INVOLVEMENT (H): ICD-10-CM

## 2020-10-27 DIAGNOSIS — Z51.81 ENCOUNTER FOR MEDICATION MONITORING: ICD-10-CM

## 2020-10-27 DIAGNOSIS — I10 HYPERTENSION GOAL BP (BLOOD PRESSURE) < 140/90: ICD-10-CM

## 2020-10-27 LAB
ALBUMIN SERPL-MCNC: 4.4 G/DL (ref 3.4–5)
ALBUMIN UR-MCNC: NEGATIVE MG/DL
ALP SERPL-CCNC: 79 U/L (ref 40–150)
ALT SERPL W P-5'-P-CCNC: 42 U/L (ref 0–50)
ANION GAP SERPL CALCULATED.3IONS-SCNC: 7 MMOL/L (ref 3–14)
APPEARANCE UR: ABNORMAL
AST SERPL W P-5'-P-CCNC: 35 U/L (ref 0–45)
BASOPHILS # BLD AUTO: 0 10E9/L (ref 0–0.2)
BASOPHILS NFR BLD AUTO: 0.3 %
BILIRUB SERPL-MCNC: 0.4 MG/DL (ref 0.2–1.3)
BILIRUB UR QL STRIP: NEGATIVE
BUN SERPL-MCNC: 20 MG/DL (ref 7–30)
CALCIUM SERPL-MCNC: 9.4 MG/DL (ref 8.5–10.1)
CHLORIDE SERPL-SCNC: 105 MMOL/L (ref 94–109)
CO2 SERPL-SCNC: 26 MMOL/L (ref 20–32)
COLOR UR AUTO: YELLOW
CREAT SERPL-MCNC: 1.13 MG/DL (ref 0.52–1.04)
CREAT UR-MCNC: 186 MG/DL
CRP SERPL-MCNC: 5.5 MG/L (ref 0–8)
DIFFERENTIAL METHOD BLD: ABNORMAL
EOSINOPHIL # BLD AUTO: 0.3 10E9/L (ref 0–0.7)
EOSINOPHIL NFR BLD AUTO: 3.4 %
ERYTHROCYTE [DISTWIDTH] IN BLOOD BY AUTOMATED COUNT: 14.2 % (ref 10–15)
ERYTHROCYTE [SEDIMENTATION RATE] IN BLOOD BY WESTERGREN METHOD: 10 MM/H (ref 0–30)
GFR SERPL CREATININE-BSD FRML MDRD: 55 ML/MIN/{1.73_M2}
GLUCOSE SERPL-MCNC: 148 MG/DL (ref 70–99)
GLUCOSE UR STRIP-MCNC: NEGATIVE MG/DL
HCT VFR BLD AUTO: 39.3 % (ref 35–47)
HGB BLD-MCNC: 12.7 G/DL (ref 11.7–15.7)
HGB UR QL STRIP: NEGATIVE
IMM GRANULOCYTES # BLD: 0 10E9/L (ref 0–0.4)
IMM GRANULOCYTES NFR BLD: 0.2 %
KETONES UR STRIP-MCNC: NEGATIVE MG/DL
LEUKOCYTE ESTERASE UR QL STRIP: NEGATIVE
LYMPHOCYTES # BLD AUTO: 1.3 10E9/L (ref 0.8–5.3)
LYMPHOCYTES NFR BLD AUTO: 13.9 %
MCH RBC QN AUTO: 25.7 PG (ref 26.5–33)
MCHC RBC AUTO-ENTMCNC: 32.3 G/DL (ref 31.5–36.5)
MCV RBC AUTO: 79 FL (ref 78–100)
MONOCYTES # BLD AUTO: 0.5 10E9/L (ref 0–1.3)
MONOCYTES NFR BLD AUTO: 5.1 %
MUCOUS THREADS #/AREA URNS LPF: PRESENT /LPF
NEUTROPHILS # BLD AUTO: 7.4 10E9/L (ref 1.6–8.3)
NEUTROPHILS NFR BLD AUTO: 77.1 %
NITRATE UR QL: NEGATIVE
NRBC # BLD AUTO: 0 10*3/UL
NRBC BLD AUTO-RTO: 0 /100
PH UR STRIP: 5 PH (ref 5–7)
PLATELET # BLD AUTO: 394 10E9/L (ref 150–450)
POTASSIUM SERPL-SCNC: 3.7 MMOL/L (ref 3.4–5.3)
PROT SERPL-MCNC: 7.6 G/DL (ref 6.8–8.8)
PROT UR-MCNC: 0.2 G/L
PROT/CREAT 24H UR: 0.11 G/G CR (ref 0–0.2)
RBC # BLD AUTO: 4.95 10E12/L (ref 3.8–5.2)
RBC #/AREA URNS AUTO: 2 /HPF (ref 0–2)
SODIUM SERPL-SCNC: 138 MMOL/L (ref 133–144)
SOURCE: ABNORMAL
SP GR UR STRIP: 1.02 (ref 1–1.03)
SQUAMOUS #/AREA URNS AUTO: 2 /HPF (ref 0–1)
UROBILINOGEN UR STRIP-MCNC: 0 MG/DL (ref 0–2)
WBC # BLD AUTO: 9.5 10E9/L (ref 4–11)
WBC #/AREA URNS AUTO: 1 /HPF (ref 0–5)

## 2020-10-27 PROCEDURE — 86140 C-REACTIVE PROTEIN: CPT | Performed by: PATHOLOGY

## 2020-10-27 PROCEDURE — 80053 COMPREHEN METABOLIC PANEL: CPT | Performed by: PATHOLOGY

## 2020-10-27 PROCEDURE — 83876 ASSAY MYELOPEROXIDASE: CPT | Mod: 90 | Performed by: PATHOLOGY

## 2020-10-27 PROCEDURE — 99000 SPECIMEN HANDLING OFFICE-LAB: CPT | Performed by: PATHOLOGY

## 2020-10-27 PROCEDURE — 81001 URINALYSIS AUTO W/SCOPE: CPT | Performed by: PATHOLOGY

## 2020-10-27 PROCEDURE — 83516 IMMUNOASSAY NONANTIBODY: CPT | Mod: 90 | Performed by: PATHOLOGY

## 2020-10-27 PROCEDURE — 86355 B CELLS TOTAL COUNT: CPT | Mod: 90 | Performed by: PATHOLOGY

## 2020-10-27 PROCEDURE — 77067 SCR MAMMO BI INCL CAD: CPT | Mod: GC

## 2020-10-27 PROCEDURE — 84156 ASSAY OF PROTEIN URINE: CPT | Performed by: PATHOLOGY

## 2020-10-27 PROCEDURE — 36415 COLL VENOUS BLD VENIPUNCTURE: CPT | Performed by: PATHOLOGY

## 2020-10-27 PROCEDURE — 82784 ASSAY IGA/IGD/IGG/IGM EACH: CPT | Mod: 90 | Performed by: PATHOLOGY

## 2020-10-27 PROCEDURE — 85025 COMPLETE CBC W/AUTO DIFF WBC: CPT | Performed by: PATHOLOGY

## 2020-10-27 PROCEDURE — 85652 RBC SED RATE AUTOMATED: CPT | Performed by: PATHOLOGY

## 2020-10-27 PROCEDURE — 86255 FLUORESCENT ANTIBODY SCREEN: CPT | Mod: 90 | Performed by: PATHOLOGY

## 2020-10-27 NOTE — LETTER
October 29, 2020      Janine Cornell  [address] [id number]        Dear ,    We are writing to inform you of your test results.    They look good.    Resulted Orders   Immunoglobulins A G and M   Result Value Ref Range     610 - 1,616 mg/dL     84 - 499 mg/dL    IGM 36 35 - 242 mg/dL   ANCA IgG by IFA with Reflex to Titer   Result Value Ref Range    Neutrophil Cytoplasmic Antibody <1:10 <1:10 [titer]    Neutrophil Cytoplasmic Antibody Pattern       The ANCA IFA is <1:10.  No further testing will be performed.   CD19 B Cell Count   Result Value Ref Range    CD19 B Cells <1 6 - 27 %    Absolute CD19 <1 107 - 698 cells/uL   Erythrocyte sedimentation rate auto   Result Value Ref Range    Sed Rate 10 0 - 30 mm/h   Protein  random urine with Creat Ratio   Result Value Ref Range    Protein Random Urine 0.20 g/L    Protein Total Urine g/gr Creatinine 0.11 0 - 0.2 g/g Cr   UA with Microscopic reflex to Culture   Result Value Ref Range    Color Urine Yellow     Appearance Urine Slightly Cloudy     Glucose Urine Negative NEG^Negative mg/dL    Bilirubin Urine Negative NEG^Negative    Ketones Urine Negative NEG^Negative mg/dL    Specific Gravity Urine 1.023 1.003 - 1.035    Blood Urine Negative NEG^Negative    pH Urine 5.0 5.0 - 7.0 pH    Protein Albumin Urine Negative NEG^Negative mg/dL    Urobilinogen mg/dL 0.0 0.0 - 2.0 mg/dL    Nitrite Urine Negative NEG^Negative    Leukocyte Esterase Urine Negative NEG^Negative    Source Midstream Urine     WBC Urine 1 0 - 5 /HPF    RBC Urine 2 0 - 2 /HPF    Squamous Epithelial /HPF Urine 2 (H) 0 - 1 /HPF    Mucous Urine Present (A) NEG^Negative /LPF   CRP inflammation   Result Value Ref Range    CRP Inflammation 5.5 0.0 - 8.0 mg/L   CBC with platelets differential   Result Value Ref Range    WBC 9.5 4.0 - 11.0 10e9/L    RBC Count 4.95 3.8 - 5.2 10e12/L    Hemoglobin 12.7 11.7 - 15.7 g/dL    Hematocrit 39.3 35.0 - 47.0 %    MCV 79 78 - 100 fl     MCH 25.7 (L) 26.5 - 33.0 pg    MCHC 32.3 31.5 - 36.5 g/dL    RDW 14.2 10.0 - 15.0 %    Platelet Count 394 150 - 450 10e9/L    Diff Method Automated Method     % Neutrophils 77.1 %    % Lymphocytes 13.9 %    % Monocytes 5.1 %    % Eosinophils 3.4 %    % Basophils 0.3 %    % Immature Granulocytes 0.2 %    Nucleated RBCs 0 0 /100    Absolute Neutrophil 7.4 1.6 - 8.3 10e9/L    Absolute Lymphocytes 1.3 0.8 - 5.3 10e9/L    Absolute Monocytes 0.5 0.0 - 1.3 10e9/L    Absolute Eosinophils 0.3 0.0 - 0.7 10e9/L    Absolute Basophils 0.0 0.0 - 0.2 10e9/L    Abs Immature Granulocytes 0.0 0 - 0.4 10e9/L    Absolute Nucleated RBC 0.0    Myeloperoxidase and Proteinase 3 Panel   Result Value Ref Range    Myeloperoxidase Antibody IgG 1.3 (H) 0.0 - 0.9 AI      Comment:      Positive  Antibody index (AI) values reflect qualitative changes in antibody   concentration that cannot be directly associated with clinical condition or   disease state.      Proteinase 3 Antibody IgG <0.2 0.0 - 0.9 AI      Comment:      Negative  Antibody index (AI) values reflect qualitative changes in antibody   concentration that cannot be directly associated with clinical condition or   disease state.     Creatinine urine calculation only   Result Value Ref Range    Creatinine Urine 186 mg/dL       If you have any questions or concerns, please call the clinic at the number listed above.       Sincerely,        Majo Mckinnon MD

## 2020-10-27 NOTE — TELEPHONE ENCOUNTER
Pt states that she is having significant amount of pain in her lower back and legs.  She feels like they are compressing on top of each other.  She is also having a lot of weakness in her hands and arms. She feels her arms getting warm and then swelling.  She finds it hard to  things.  She says that her hands are always painful.  She does notice that occ, she does get sick from the tramadol, she will vomit in the middle of night.  This is when she has not taken it for a couple of days and then restarts tramadol.   She is also having some vertigo.  She feels off balance and feels like it is getting worse.      Pain has been off and on all summer, but over the last month or so, things have gotten really bad.  She says that normally with weather changes she notices increase in pain in her body.  But she is noticing it is worse this time, and now it is including the nausea and dizziness. She also notes that brain fog is worse this time around.      She says that she notices that towards that last couple of months before her Rituximab, she feels like it is wearing off and that her symptoms get better after the rituximab.    Will send message to Dr. Mckinnon, will update pt after I hear from her and pt states it is ok to leave a message.    Desiree Godinez RN  [facility]

## 2020-10-28 LAB
ANCA AB PATTERN SER IF-IMP: NORMAL
C-ANCA TITR SER IF: NORMAL {TITER}
CD19 CELLS # BLD: <1 CELLS/UL (ref 107–698)
CD19 CELLS NFR BLD: <1 % (ref 6–27)
IGA SERPL-MCNC: 191 MG/DL (ref 84–499)
IGG SERPL-MCNC: 729 MG/DL (ref 610–1616)
IGM SERPL-MCNC: 36 MG/DL (ref 35–242)
MYELOPEROXIDASE AB SER-ACNC: 1.3 AI (ref 0–0.9)
PROTEINASE3 IGG SER-ACNC: <0.2 AI (ref 0–0.9)

## 2020-10-29 NOTE — TELEPHONE ENCOUNTER
Majo Mckinnon MD Beard, Desiree, RN   Caller: Unspecified (2 days ago,  3:22 PM)             If she gets these sx when rituximb wears off and is usual for her, could wait to get the rituximab.     If acute and new neurologic sx, should go to ER to get spine MRI     Called and spoke with pt, she feels that these may be more symptoms of her Rituximab wearing off.  If things get too bad, she will go to ER.      Pt's labs are back, will see when Dr. Mckinnon would like to set up her infusions.    Desiree Godinez RN  [facility]

## 2020-10-29 NOTE — TELEPHONE ENCOUNTER
Form has been faxed to [employer] per request.  Scanned into chart and original has been mailed to pt.    Desiree Godinez RN  [facility]

## 2020-11-02 NOTE — TELEPHONE ENCOUNTER
Majo Mckinnon MD Beard, Madeline, RN   Caller: Unspecified (4 days ago,  4:49 PM)             Yes early Dec given sx, 1 gram only given CD19<1 and stable labs.      Infusion set up for provider to review and sign.    Desiree Godinez RN  [facility]

## 2020-11-11 NOTE — TELEPHONE ENCOUNTER
Infusion has been scheduled, need to set up COVID test.  Left message requesting return call from pt.    Desiree Godinez RN  [facility]

## 2020-11-13 NOTE — TELEPHONE ENCOUNTER
Called and spoke with patient have confirmed date and time of infusion and covid test. Clarified reason for going to ER if pain gets worse.  She feels that her sciatic nerve is out of whack and she is hoping that her symptoms will subside after the infusion.      Desiree Godinez RN  [facility]

## 2020-12-23 DIAGNOSIS — M19.90 INFLAMMATORY ARTHRITIS: ICD-10-CM

## 2020-12-23 RX ORDER — HYDROXYCHLOROQUINE SULFATE 200 MG/1
400 TABLET, FILM COATED ORAL DAILY
Qty: 180 TABLET | Refills: 1 | Status: CANCELLED | OUTPATIENT
Start: 2020-12-23

## 2020-12-29 NOTE — TELEPHONE ENCOUNTER
Call to [facility] pharmacy after receiving paper refill request, message left of this prescription having been sent 9/4/20 for #180 with 1 refill, no further authorization should be needed at this time, as should last for another 2 months

## 2020-12-30 DIAGNOSIS — M35.9 UNDIFFERENTIATED CONNECTIVE TISSUE DISEASE (H): ICD-10-CM

## 2020-12-30 NOTE — TELEPHONE ENCOUNTER
Medication: tramadol 50  mg    Last Office Visit Date: 7/1/2020  Future Office Visit Date: 1/15/2021  Last Prescription Fill Date: 11/18/2020  Last Fill Quantity: #60     reviewed as follows:      Request sent to provider for review and signature.    Beluah Fortune CMA   12/30/2020 11:19 AM

## 2020-12-31 RX ORDER — TRAMADOL HYDROCHLORIDE 50 MG/1
50 TABLET ORAL EVERY 8 HOURS PRN
Qty: 60 TABLET | Refills: 3 | Status: SHIPPED | OUTPATIENT
Start: 2020-12-31 | End: 2021-04-22

## 2021-01-15 ENCOUNTER — HEALTH MAINTENANCE LETTER (OUTPATIENT)
Age: 56
End: 2021-01-15

## 2021-01-15 ENCOUNTER — VIRTUAL VISIT (OUTPATIENT)
Dept: RHEUMATOLOGY | Facility: CLINIC | Age: 56
End: 2021-01-15
Attending: INTERNAL MEDICINE
Payer: COMMERCIAL

## 2021-01-15 DIAGNOSIS — E55.9 VITAMIN D DEFICIENCY: ICD-10-CM

## 2021-01-15 DIAGNOSIS — M19.90 INFLAMMATORY ARTHRITIS: ICD-10-CM

## 2021-01-15 DIAGNOSIS — M31.30 GRANULOMATOSIS WITH POLYANGIITIS, UNSPECIFIED WHETHER RENAL INVOLVEMENT (H): Primary | ICD-10-CM

## 2021-01-15 DIAGNOSIS — Z51.81 ENCOUNTER FOR MEDICATION MONITORING: ICD-10-CM

## 2021-01-15 PROCEDURE — 99214 OFFICE O/P EST MOD 30 MIN: CPT | Mod: 95 | Performed by: INTERNAL MEDICINE

## 2021-01-15 ASSESSMENT — PAIN SCALES - GENERAL: PAINLEVEL: MILD PAIN (3)

## 2021-01-15 NOTE — LETTER
1/15/2021       RE: Janine Cornell  [address] [facility] [id number]     Dear Colleague,    Thank you for referring your patient, Janine Cornell, to the [facility]HE[facility] [place] at [facility]. Please see a copy of my visit note below.    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 Cornell is a 51 yo AAF who was referred to our clinic for evaluation and management of her joint pain in setting of positive RF 26.    Her joint symptoms first started more than a year ago, but over last 6-8 months fluctuating some good and bad days . All her joints are involved. Over last 5 months, hands became swollen, warm and painful. Tylenol does not help. Ketoprofen did not help. Was told to avoid NSAIDs given ACS. Reports AM/PM stiffness x 2-3 hr, can't make full fist when wakes up in AM.    She feels tired all the time. Reports worsening hair loss and unchanged  facial rash. Gets red sore flaky rash over cheeks across her face which is intermittent diagnosed Rosacea and is prescribed metronidazole gel which she has not started using. Reports occasional oral ulcers. Hands feel cold with red discoloration last winter. Has occasional dysphagia to both solids and liquid. Has dry eyes, is using OTC allergy eyedrops. Has chronic abdominal pain. Has h/o pancreatitis. Sometime has anxiety. Occasionally has numbness, tingling in fingers/toes. Has back and spine issues, her whole back and neck hurts, different areas at different time. She is wondering if she has FMS. Has hard time sleeping, has never been diagnosed with BRENNA. She does not know if he snores. She was found to  have slightly positive RF in 2/2013. Has microcytic anemia.     Her arthralgia gets worse with drop in temperature. Reports body ache. Activity makes her pain worse. Her joint swelling isintermittent. Her body pain and joint pain is the same. Reports AM stiffness x 3 hr.    She has been doing acupuncture x few months and it is helping with her pain. She thinks that the combination of plaquenil and acupuncture is helpful but overall she notice 30% in her symptoms relief.     Takes tylenol and tramadol on occasion for pain.    She decided to use different formulation of metformin which helped with her abdominal pain. I referred her to GI for evaluation of elevated lipase and abdominal pain. She decided to see GI in the future.     She is on  mg qd since 5/2014, tolerates it well and it helps her some. Denies taking any gabapentin due to chest pain and headches. She has had referral her for water aerobics, but she can't begin until she's healed from recent surgery in 10/2014. She thinks HCQ is helping but not enough to control all her pain, reports 2-3 hr of AM stiffness with ongoing diffuse arthralgia/myalgia along with intermittent swelling of hands. She does see her acupuncture person and it helps with her pain, has not started water aerobics yet. Has got her flu shot.     10/2015: Reports having pleuritic CP in 3/2015, was prescribed Lidoderm patches first. It did not help. Took prednisone (?dose) by her own, pain got better but it recurred off prednisone and gradually got     worse to the point that she could not stand the pain anymore, was seen in ER in 5/2015, was treated with medrol dose pack which helped. Reports pain over hips, knees, ankles and fingers. Pain gets worse with walking. Reports myalgia, swelling of the arms. Pain over neck, shoulders. Has AM stiffness x 3-4 hr. Fingers swell up and become painful. Can't remember if steroid helped with joint pain. She had headaches, severe CP when she took  tramadol and gabapentin and stopped taking them, sx resolved but she can't tell which one caused SEs but thinks that probably it was gabapentin. She has good days and tries to be more active but activity aggravates the pain. Headaches are intermittent. HCQ helps some not enough to control all the pain. No HCQ SEs. Had eye exam around July 2015. Flexeril helps but PCP wants to change flexeril to zanaflex, reporting that she is NOT comfortable with the change. Acupuncture still helps an she continued to  do that. Concerned about fat pads she has in supraclavicular area, has h/o thyroid nodules. Continues having hair loss, takes iron for iron deficiency.     2/2016: She has 2 major complaints today, increased joint pain/swelling in hands/feet and recent Dx of optic neuritis in R eye, reports having similar problem about 20 yr ago, now recurred. Has a spot in R eye vision x long term, was seen by ophthalmology few days ago and was told to have optic neuritis. Gets joint pain, muscle pain. Can't  objects, hands are swollen. Knees, hips and ankles are painful. Reports more arthralgia. AM stiffness/ pain is 3-4 hr. She wants me to talk to her ophthalmologist directly.    She had botox inj for migraines which did not help her. She is going to have angiogram next wk, had to take more nitro for CP recently.     4/29/2016: She reports being on steroid taper x 3 since last visit. Reportedly, had orbit MRI, it showed swelling/inflamamtion of R lacrimal glands. She had painful swelling of her R eye, cause her headaches. Botox inj made her headaches worse. She is being dealing with this since 1/2016, with severe headaches and pain/swelling around R eye. Had biopsy in 3/2016. She is frustrated as prednisone causes her weight gain and her BG is difficult to control on it.    5/2016: At last visit, was prescribed MTX 10 mg po qwk to take along with FA 1 mg qd. She decided not to take MTX, is afraid of side effects, believes all  these medications would harm her. She also believes that botox caused her inflammation around R eye. No major flare up of per-orbital inflamamtion since last visit but continues to have swelling around her R eye.    11/2016: Reports diffuse body ache, arthralgia, myalgia especially with weather change. No major flare up of campos-orbital inflammation but her face/around R eye swells up from time to time. Reports 2 episodes of CP over past 2 mo, nitro sometimes helps and sometimes does not help. She has asthma and costochondritis and she has hard time to distinguish the origin of pain. Sometimes knees and fingers swell up. Her stiffness is sometimes all day.    4/2017:  Reports having sinusitis since last visit. Her R eye is dry and is using eyedrops to keep it moist. Reports having pain in ears. Was treated with antibiotics by PCP, it got better then it got worse. Reports catching infections easily. Then started having pressure over eyes with pain/headaches (exact start date is unknown). Pain gradually got worse and became persistent. Had sinus CT.     4/7/2017: Having a flare up of swelling, pain around her R orbit. Has not tried MTX yet.    5/2017: On MTX 10 mg po qwk since 4/7/2017, reports increased stomach pain and nausea and new headaches since start of MTX and it's not helping. Pain/swelling around R orbit is worse.    6/2017: She finished prednisone taper prescribed at last visit, R campos-orbital swelling significantly improved. 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    2/2018: R campos-orbital swelling has improved. Repeat R lacrimal gland biopsy in 8/2017 showed non specific inflammation with no lymphoma. She is off steroid. Did not tolerate AZA due to N/V and abdominal pain.    Is back with recurrence of R campos-orbital sweling x past 2 months. Pain really got  worse over past 3wk, requries     9/2018: Declined ritiximab at last visit, lost f/u since 2/2018. Went to [facility] and was seen on 8/29 by Dr. 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 Reportedly he ordered labs and recommended surgery since the inflammation of the R eye is back and is pushing the eye down. Janine took some prednsione 30 mg every day few days a go for pain.    3/2019: She had lateral orbitotomy and debulking orbital mass right eye on 9/26/18 with Dr. Shah and Dr. Valdez at [facility]. 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 There were no complications according to the op note.    Janine finally agreed to receive rituximab for her GPA. She had it as 1 gr q2wk x 2 on 12/12/2018 and 12/26/2018. Had minor allergic reaction which was managed by IV solu cortef and benadryl. She is off prednisone about 6wk after surgery. Had bleeding ulcer from prednsione. Has pain over sinus, ears. Still has residual pain, swelling around her R eye is concerned about it. Wants to transfer her care to ophthalmology here as it's closer to her.    Cold induced sweating congestion joint pain  Facial swelling  allegy doctor clear ear pft ok doxy sinusitis     (7/12/19):  Patient completed her 2 rounds of rituximab in June. She tolerated well. She was recently seen by Dr. Vernon in Optho and repeat CT scan of orbits shows some decrease in proptosis. She reports that her R eye still intermittently feels puffy. She also mentions intermittent swelling in her arms. Unclear of any triggers. Limbs are also heavy with muscle aches and some joint pains. Exacerbated with activity.    10/29/2019:    Not feeling well with headaches, diffuse arthralgia and myalgia being worse over past 2 months. Headaches wax and wane and never go away. Gets swelling around her R eyes with numbness/tingling of R cheek.    Gets hives randomly, over trunk, extremities.    Has shortness of breath on walking fast.  Gets cough from asthma, nothing new. No hemoptysis. Fingers start to hurt and itch with drop in T.       2/21/2020:    Not feeling well. Reports SOB, pleuritic CP, dry cough since early Feb. Feels retaining fluid. Gets headaches. Her cardiologist recommended cardiac cath. Tylenol is not helping. T was 101 last wk, not today, took tylenol to drop tylenol and used ice pack on her head and had lots of water and tea, has poor appetite. Feeling better but cough is still there, worse in AM and night. First had blood in sputum but does not have it anymore. Has diffuse arthralgia, myalgia, worse x past 3 months due to cold weather. Fatigue is worse. No pleuritic CP today. No fevers today but feels short of breath a lot (became chronic), worse with activity.    No rash.    Her eye swelling/pseudotumor is quiet, still has residual numbness from surgery, gets headaches.    Saw Dr. Vernon in 1/2020, Dr. Vernon thought that pseudotumor responded to rituximab.      Last rituximab was in 12/5/2019 and 12/19/2019.    Today 2/21/2020:    Overall stable. No flare up. No change since in symptoms last visit. Last rituximab was 1 gr on 6/18/2020, next scheduled for 7/2/2020.    Today 1/15/2021: Not feeling well today. She ahs been feeling poorly since Monday. Somebody was sick around her.  She started coughing on Monday, it has been a dry cough. Once or twice had clear sputum, no hemoptysis. Has stickle in her throat, had some discomoft in her chest yesterday, it is better today.  No fevers. Feeling achy.       Towards the  End of 3-4 months of rituximab, her eye starts to swell up. Gets more swelling of hands, feet and starts getting headaches.    rituximab 5 mo next mo    Wrists, r knee, feet are swollen. sometiems it's hard to get up. 1-2 wk     Skin rash            Component      Latest Ref Rng 2/28/2013 2/28/2013          12:14 PM 12:14 PM   WBC      4.0 - 11.0 10e9/L     RBC Count      3.8 - 5.2 10e12/L     Hemoglobin      11.7 - 15.7  g/dL     Hematocrit      35.0 - 47.0 %     MCV      78 - 100 fl     MCH      26.5 - 33.0 pg     MCHC      31.5 - 36.5 g/dL     RDW      10.0 - 15.0 %     Platelet Count      150 - 450 10e9/L     Specimen Description           Lyme Screen IgG and IgM           Vitamin D Deficiency screening      30 - 75 ug/L     Ferritin      10 - 300 ng/mL     Sed Rate      0 - 20 mm/h     ALLI Screen by EIA      <1.0     Rheumatoid Factor      0 - 14 IU/mL     CK Total      30 - 225 U/L  78   Uric Acid      2.5 - 7.5 mg/dL 6.7      Component      Latest Ref Rng 2/28/2013          12:14 PM   WBC      4.0 - 11.0 10e9/L    RBC Count      3.8 - 5.2 10e12/L    Hemoglobin      11.7 - 15.7 g/dL    Hematocrit      35.0 - 47.0 %    MCV      78 - 100 fl    MCH      26.5 - 33.0 pg    MCHC      31.5 - 36.5 g/dL    RDW      10.0 - 15.0 %    Platelet Count      150 - 450 10e9/L    Specimen Description       Serum   Lyme Screen IgG and IgM       Test value: <0.75....Interpretation: Negative....If you highly suspect Lyme . . .   Vitamin D Deficiency screening      30 - 75 ug/L    Ferritin      10 - 300 ng/mL    Sed Rate      0 - 20 mm/h    ALLI Screen by EIA      <1.0    Rheumatoid Factor      0 - 14 IU/mL    CK Total      30 - 225 U/L    Uric Acid      2.5 - 7.5 mg/dL      Component      Latest Ref Rng 2/28/2013 2/28/2013 2/28/2013 2/28/2013          12:14 PM 12:14 PM 12:14 PM 12:14 PM   WBC      4.0 - 11.0 10e9/L       RBC Count      3.8 - 5.2 10e12/L       Hemoglobin      11.7 - 15.7 g/dL       Hematocrit      35.0 - 47.0 %       MCV      78 - 100 fl       MCH      26.5 - 33.0 pg       MCHC      31.5 - 36.5 g/dL       RDW      10.0 - 15.0 %       Platelet Count      150 - 450 10e9/L       Specimen Description             Lyme Screen IgG and IgM             Vitamin D Deficiency screening      30 - 75 ug/L       Ferritin      10 - 300 ng/mL    10   Sed Rate      0 - 20 mm/h   23 (H)    ALLI Screen by EIA      <1.0  <1.0 . . .     Rheumatoid Factor       0 - 14 IU/mL 26 (H)      CK Total      30 - 225 U/L       Uric Acid      2.5 - 7.5 mg/dL         Component      Latest Ref Rng 2/28/2013 2/28/2013          12:14 PM 12:14 PM   WBC      4.0 - 11.0 10e9/L 14.1 (H)    RBC Count      3.8 - 5.2 10e12/L 4.55    Hemoglobin      11.7 - 15.7 g/dL 10.7 (L)    Hematocrit      35.0 - 47.0 % 33.3 (L)    MCV      78 - 100 fl 73 (L)    MCH      26.5 - 33.0 pg 23.5 (L)    MCHC      31.5 - 36.5 g/dL 32.1    RDW      10.0 - 15.0 % 18.1 (H)    Platelet Count      150 - 450 10e9/L 407    Specimen Description           Lyme Screen IgG and IgM           Vitamin D Deficiency screening      30 - 75 ug/L  32   Ferritin      10 - 300 ng/mL     Sed Rate      0 - 20 mm/h     ALLI Screen by EIA      <1.0     Rheumatoid Factor      0 - 14 IU/mL     CK Total      30 - 225 U/L     Uric Acid      2.5 - 7.5 mg/dL          MRI CERVICAL SPINE WITHOUT CONTRAST 4/3/2013 12:47 PM    HISTORY: Cervicalgia. Degeneration of cervical intervertebral disc.  MRI cervical spine. Evaluate bilateral supraclavicular lymph nodes.  Clinical enlargement.    TECHNIQUE: Multiplanar multisequence MRI of the cervical spine  without gadolinium contrast.    COMPARISON: None.    FINDINGS: The patient has a developmentally small central canal.  Images of the cervical cord reveals small areas of T2 hyperintensity  within the cervical cord. There is a small area of high signal  intensity at the C1 level. There is also a small area of high signal  intensity at the C6-C7 level. The area of high signal at C6-C7 is  located at an area of central spinal stenosis. This may be due to  myelomalacia. The area of high signal at C1-C2 is indeterminate.    C2-C3: Normal disc, facet joints, spinal canal and neural foramina.    C3-C4: Normal disc, facet joints, spinal canal and neural foramina.    C4-C5: Normal disc, facet joints, spinal canal and neural foramina.    C5-C6: There is degeneration of the disc. There is a mild annular  disc  bulge. The central canal is mild-moderately narrowed. The  residual AP diameter of the central spinal canal measures  approximately 9 mm.    C6-C7: There is degeneration of the disc. There is loss of disc space  height. There is a diffuse annular disc bulge. There are associated  posterior osteophytes. There are severe central spinal stenosis at  this level. The residual AP diameter of the central spinal canal is  only about 6 mm. There is significant flattening of the cord. There  is high signal in the cord at this level consistent with  myelomalacia. There is moderate to severe right foraminal stenosis  due to and uncinate spur.    C7-T1: Normal disc, facet joints, spinal canal and neural foramina.            Result Impression       IMPRESSION:  1. Severe central spinal stenosis at C6-C7 due to a developmentally  small canal and due to a bulging disc and associated posterior  osteophyte. There is flattening of the cord at this level and high  signal in the cord at this level consistent with myelomalacia.  2. There is a small, indeterminate 2-3 mm area of high signal  intensity in the cord at the C1 level. This could be due to gliosis  secondary to a previous infectious or inflammatory process.  Demyelinating disease could also have a similar appearance. Clinical  correlation suggested.    GAIL MORE MD     MRI LEFT UPPER EXTREMITY NON-JOINT WITHOUT CONTRAST, MRI RIGHT UPPER  EXTREMITY NON-JOINT WITHOUT CONTRAST April 3, 2013 1:32 PM    HISTORY: Cervicalgia. Degeneration of cervical intervertebral disc.    TECHNIQUE: Multiplanar, multisequence imaging of the brachial plexus  was performed without gadolinium contrast enhancement.    COMPARISON: None.    FINDINGS: No mass lesions are seen. No inflammatory process is  identified. The roots, trunks, branches, and divisions of both the  right and left brachial plexus appear normal. No adenopathy is seen.  The anterior scalene muscles and the middle scalene muscles  appear  normal. Nodules are seen within the thyroid gland. The largest nodule  is seen in the left lobe of the thyroid. This measures about 1.8 cm  in diameter.            Result Impression       IMPRESSION:  1. Both the right and left brachial plexus regions appear normal.  2. Thyroid nodules. The largest nodule is in the left lobe of the  thyroid. It measures about 1.8 cm in diameter.       XR WRIST BILATERAL G/E 3 VIEWS    Narrative:        EXAMINATION:  1. Each hand 3 views  2. Each wrist 3 views     DATE: 5/1/2013     HISTORY: Arthropathy with concern for rheumatoid.     FINDINGS: 3 views of each hand and 3 views of each wrist are obtained  without prior for comparison. Alignment is normal. There is no  fracture or acute bone abnormality. No distinct erosions are seen.  Some spurring of the distal radial ulnar joint is noted on the right.       Impression:     IMPRESSION:  1. No evidence of an inflammatory arthropathy involving either hand  or wrist.     VIELKA GUEVARA MD         XR FOOT BILATERAL G/E 3 VIEWS    Narrative:        EXAMINATION:  1. Each foot 3 views  2. Each ankle 3 views  3. Sacroiliac joint series     DATE: 5/1/2013     HISTORY: Arthropathy; concern for rheumatoid.     FINDINGS: No prior for comparison.     A frontal and bilateral oblique evaluation of the sacroiliac joints  shows no malalignment. Some patchy sclerosis is identified along the  central aspect of both sacroiliac joints, which is favored to be  degenerative. No distinct erosions are seen. The visualized portion  of the hip joint spaces appear maintained, with no erosive changes  identified. Mild endplate spurring is noted in the lower lumbar  spine.     3 views of each foot and 3 views of each ankle show no evidence of  acute fracture or dislocation. The metatarsal phalangeal,  tarsometatarsal and intertarsal joint spaces appear maintained. No  erosions are identified. There is no sign of acroosteolysis. The  ankle mortise and talar  dome are intact bilaterally. Minimal spurring  is noted along the tip of the medial malleolus bilaterally.       Impression:     IMPRESSION:  1. Patchy sclerosis identified along the central aspect of both  sacroiliac joints, which is favored to be degenerative. No distinct  erosions are seen.  2. No evidence of an inflammatory arthropathy in either foot or ankle.     VIELKA GUEVARA MD     5/2013  CBC WITH PLATELETS DIFFERENTIAL       Component Value Range    WBC 11.3 (*) 4.0 - 11.0 10e9/L    RBC Count 4.56  3.8 - 5.2 10e12/L    Hemoglobin 11.1 (*) 11.7 - 15.7 g/dL    Hematocrit 34.3 (*) 35.0 - 47.0 %    MCV 75 (*) 78 - 100 fl    MCH 24.3 (*) 26.5 - 33.0 pg    MCHC 32.4  31.5 - 36.5 g/dL    RDW 16.1 (*) 10.0 - 15.0 %    Platelet Count 315  150 - 450 10e9/L    Diff Method Automated Method      % Neutrophils 71.6  40 - 75 %    % Lymphocytes 20.9  20 - 48 %    % Monocytes 4.3  0 - 12 %    % Eosinophils 2.8  0 - 6 %    % Basophils 0.2  0 - 2 %    % Immature Granulocytes 0.2  0 - 0.4 %    Absolute Neutrophil 8.1  1.6 - 8.3 10e9/L    Absolute Lymphocytes 2.4  0.8 - 5.3 10e9/L    Absolute Monoctyes 0.5  0.0 - 1.3 10e9/L    Absolute Eosinophils 0.3  0.0 - 0.7 10e9/L    Absolute Basophils 0.0  0.0 - 0.2 10e9/L    Abs Immature Granulocytes 0.0  0 - 0.03 10e9/L   AMYLASE       Component Value Range    Amylase 103  30 - 110 U/L   COMPREHENSIVE METABOLIC PANEL       Component Value Range    Sodium 144  133 - 144 mmol/L    Potassium 3.8  3.4 - 5.3 mmol/L    Chloride 105  94 - 109 mmol/L    Carbon Dioxide 23  20 - 32 mmol/L    Anion Gap 17  6 - 17 mmol/L    Glucose 173 (*) 60 - 99 mg/dL    Urea Nitrogen 13  5 - 24 mg/dL    Creatinine 0.83  0.52 - 1.04 mg/dL    GFR Estimate 74  >60 mL/min/1.7m2    GFR Estimate If Black 90  >60 mL/min/1.7m2    Calcium 9.7  8.5 - 10.4 mg/dL    Bilirubin Total 0.4  0.2 - 1.3 mg/dL    Albumin 4.3  3.9 - 5.1 g/dL    Protein Total 7.8  6.8 - 8.8 g/dL    Alkaline Phosphatase 66  40 - 150 U/L    ALT 36  0 - 50  U/L    AST 28  0 - 45 U/L   CK TOTAL       Component Value Range    CK Total 66  30 - 225 U/L   CRP INFLAMMATION       Component Value Range    CRP Inflammation 10.4 (*) 0.0 - 8.0 mg/L   LIPASE       Component Value Range    Lipase 353 (*) 20 - 250 U/L   ERYTHROCYTE SEDIMENTATION RATE AUTO       Component Value Range    Sed Rate 26 (*) 0 - 20 mm/h   ROUTINE UA WITH MICROSCOPIC REFLEX TO CULTURE       Component Value Range    Color Urine Yellow      Appearance Urine Slightly Cloudy      Glucose Urine 30 (*) NEG mg/dL    Bilirubin Urine Negative  NEG    Ketones Urine 5 (*) NEG mg/dL    Specific Gravity Urine 1.026  1.003 - 1.035    Blood Urine Trace (*) NEG    pH Urine 5.0  5.0 - 7.0 pH    Protein Albumin Urine 10 (*) NEG mg/dL    Urobilinogen mg/dL Normal  0.0 - 2.0 mg/dL    Nitrite Urine Negative  NEG    Leukocyte Esterase Urine Negative  NEG    Source Midstream Urine      WBC Urine 1  0 - 2 /HPF    RBC Urine 4 (*) 0 - 2 /HPF    Squamous Epithelial /HPF Urine <1  0 - 1 /HPF    Mucous Urine Present (*) NEG /LPF    Hyaline Casts 3 (*) 0 - 2 /LPF    Calcium Oxalate Moderate (*) NEG /HPF   COMPLEMENT C3       Component Value Range    Complement C3 143  76 - 169 mg/dL   COMPLEMENT C4       Component Value Range    Complement C4 31  15 - 50 mg/dL   HEPATITIS C ANTIBODY       Component Value Range    Hepatitis C Antibody Negative  NEG   CARDIOLIPIN ANTIBODY IGG AND IGM       Component Value Range    Cardiolipin IgG Marline    0 - 15.0 GPL    Value: <15.0      Interpretation:  Negative    Cardiolipin IgM Marline    0 - 12.5 MPL    Value: <12.5      Interpretation:  Negative   LUPUS PANEL       Component Value Range    Lupus Result    NEG    Value: Negative      (Note)      COMMENTS:      The INR is normal.      APTT is normal.  1:2 Mix is not indicated.      DRVVT Screen is normal.      Thrombin time is normal.      NEGATIVE TEST; A LUPUS ANTICOAGULANT WAS NOT DETECTED IN THIS      SPECIMEN WITHIN THE LIMITS OF THE TESTING  REPERTOIRE.      If the clinical picture is strongly suggestive of an antiphospholipid      syndrome, recommend anticardiolipin and beta-2-glycoprotein (IgG and      IgM) antibody tests.      Leela Franks M.D.  [phone]      5/2/2013            INR =  0.93 N = 0.86-1.14  (No additional charge)      TT = 15.7 N = 13.0-19.0 sec  (No additional charge)            APTT'S:    Seconds      Reagent =  Stago LA      Normal  =  38      Patient  =  35      1:2 Mix  =  N/A      Reference =  [id number]             DILUTE MADELINE VIPER VENOM TEST:      DRVVT Screen Ratio = 0.76 Normal = <1.21         IMMUNOGLOBULIN G SUBCLASSES       Component Value Range    IGG 1030  695 - 1620 mg/dL    IgG1 488  300 - 856 mg/dL    IgG2 325  158 - 761 mg/dL    IgG3 47  24 - 192 mg/dL    IgG4 18  11 - 86 mg/dL   SUNNY ANTIBODY PANEL       Component Value Range    Ribonucleic Protein IgG Antibody 0      Smith Antibody IgG 1      SSA (RO) Antibody IgG 4      SSB (LA) Antibody IgG 0      Scleroderma Antibody IgG 0     BETA 2 GLYCOPROTEIN ANTIBODIES IGG IGM       Component Value Range    Beta-2-Glycopro IgG 1      Beta-2-Glycopro IgM 5     CYCLIC CIT PEPT IGG       Component Value Range    Cyclic Cit Pept IgG/IgA    <20 UNITS    Value: <20      Interpretation:  Negative   DNA DOUBLE STRANDED ANTIBODIES       Component Value Range    DNA-ds    0 - 29 IU/mL    Value: <15      Interpretation:  Negative       CT CHEST PULMONARY EMBOLISM W CONTRAST 5/20/2015 4:57 PM  HISTORY: Pain. SOB. Elevated d-dimer.   TECHNIQUE: 65 mL Isovue 370. Axial images with coronal  reconstructions.  COMPARISON: None.  FINDINGS: Calcified granuloma with surrounding scarring in the  posterolateral left upper lobe. Triangular shaped opacity at the right  lung base in the lateral right middle lobe could represent some  scarring, atelectasis or infiltrate. There is also some scarring or  atelectasis in the posteromedial right lung base. Lungs otherwise  clear.  The pulmonary  arteries are well opacified. No CT evidence for acute  pulmonary embolus. No aortic dissection.  Diffuse fatty infiltration of the liver.  IMPRESSION  IMPRESSION:   1. No pulmonary embolus identified.  2. Small focus of atelectasis, infiltrate or scarring in the lateral  right middle lobe.  3. Old granulomatous disease.  4. Otherwise negative.  SILVERIO VAZQUEZ MD    Results for FAVIO MARTINEZ (MRN [id number]) as of 10/30/2015 17:00   Ref. Range 8/21/2012 09:06 5/22/2013 14:22 4/14/2014 12:06 9/11/2014 12:35 12/4/2014 16:38   TSH Latest Range: 0.40-4.00 mU/L 0.83 0.43 0.27 (L) 0.23 (L) 0.22 (L)     Component      Latest Ref Rng 10/30/2015   WBC      4.0 - 11.0 10e9/L 13.4 (H)   RBC Count      3.8 - 5.2 10e12/L 4.76   Hemoglobin      11.7 - 15.7 g/dL 12.4   Hematocrit      35.0 - 47.0 % 37.9   MCV      78 - 100 fl 80   MCH      26.5 - 33.0 pg 26.1 (L)   MCHC      31.5 - 36.5 g/dL 32.7   RDW      10.0 - 15.0 % 14.5   Platelet Count      150 - 450 10e9/L 324   Diff Method       Automated Method   % Neutrophils       67.7   % Lymphocytes       22.7   % Monocytes       6.3   % Eosinophils       2.7   % Basophils       0.4   % Immature Granulocytes       0.2   Absolute Neutrophil      1.6 - 8.3 10e9/L 9.1 (H)   Absolute Lymphocytes      0.8 - 5.3 10e9/L 3.1   Absolute Monoctyes      0.0 - 1.3 10e9/L 0.9   Absolute Eosinophils      0.0 - 0.7 10e9/L 0.4   Absolute Basophils      0.0 - 0.2 10e9/L 0.1   Abs Immature Granulocytes      0 - 0.4 10e9/L 0.0   Creatinine      0.52 - 1.04 mg/dL 1.21 (H)   GFR Estimate      >60 mL/min/1.7m2 47 (L)   GFR Estimate If Black      >60 mL/min/1.7m2 57 (L)   Iron      35 - 180 ug/dL 70   Iron Binding Cap      240 - 430 ug/dL 428   Iron Saturation Index      15 - 46 % 16   Albumin      3.4 - 5.0 g/dL 4.6   ALT      0 - 50 U/L 29   AST      0 - 45 U/L 21   CRP Inflammation      0.0 - 8.0 mg/L <2.9   Sed Rate      0 - 20 mm/h 8   Vitamin D Deficiency screening      20 - 75 ug/L 46   Ferritin       8 - 252 ng/mL 18   TSH      0.40 - 4.00 mU/L 0.49   T4 Free      0.76 - 1.46 ng/dL 1.06   Free T3      2.3 - 4.2 pg/mL 2.8     Component      Latest Ref Rng 11/17/2015   Testosterone Total      8 - 60 ng/dL 19   Sex Hormone Binding Globulin      30 - 135 nmol/L 32   Free Testosterone Calculated      0.11 - 0.58 ng/dL 0.34   Vitamin A       0.61   Retinol Palmitate       <0.02 . . .   Vitamin A Interp       Normal . . .   Thyroglobulin Antibody      <40 IU/mL <20   Thyroid Peroxidase Antibody      <35 IU/mL 31   DHEA Sulfate      35 - 430 ug/dL 101   Zinc       68     Component      Latest Ref Rng 1/27/2016   Sodium      133 - 144 mmol/L 135   Potassium      3.4 - 5.3 mmol/L 4.0   Chloride      94 - 109 mmol/L 104   Carbon Dioxide      20 - 32 mmol/L 24   Anion Gap      3 - 14 mmol/L 7   Glucose      70 - 99 mg/dL 88   Urea Nitrogen      7 - 30 mg/dL 20   Creatinine      0.52 - 1.04 mg/dL 1.13 (H)   GFR Estimate      >60 mL/min/1.7m2 51 (L)   GFR Estimate If Black      >60 mL/min/1.7m2 62   Calcium      8.5 - 10.1 mg/dL 9.4   Bilirubin Total      0.2 - 1.3 mg/dL 0.7   Albumin      3.4 - 5.0 g/dL 4.3   Protein Total      6.8 - 8.8 g/dL 7.7   Alkaline Phosphatase      40 - 150 U/L 66   ALT      0 - 50 U/L 24   AST      0 - 45 U/L 18   Cholesterol      <200 mg/dL 112   Triglycerides      <150 mg/dL 100   HDL Cholesterol      >49 mg/dL 34 (L)   LDL Cholesterol Calculated      <100 mg/dL 58   Non HDL Cholesterol      <130 mg/dL 78   N-Terminal Pro Bnp      0 - 125 pg/mL 29   Hemoglobin A1C      4.3 - 6.0 % 7.0 (H)   Amylase      30 - 110 U/L 60   Lipase      73 - 393 U/L 394 (H)   Troponin I ES      0.000 - 0.045 ug/L <0.015 . . .     Component      Latest Ref Rng 2/24/2016   Angiotensin Converting Enzyme       <5 (L) . . .   Neutrophil Cytoplasmic IgG Antibody       <1:20 . . .   Sed Rate      0 - 20 mm/h 86 (H)     Copath Report      Patient Name: FAVIO MARTINEZ   MR#: [id number]   Specimen #: [id number]   Collected:  "3/15/2016   Received: 3/15/2016   Reported: 3/17/2016 13:33   Ordering Phy(s): PARVEEN GRACE ZOE     ORIGINAL REPORT FOLLOWS ADDENDUM  ADDENDUM     TO ORIGINAL REPORT   Status: Signed Out   Date Ordered:3/18/2016   Date Complete:3/18/2016   Date Reported:3/18/2016 12:06   Signed Out By: Marianne Godfrey MD     INTERPRETATION:   This addendum is done to incorporate the results of fungal (GMS) stains   done on the specimen.  Specimen is negative for fungal organisms.  The   original final diagnosis remains unchanged.     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  Negative for malignancy(Please   see microscopic description)     Electronically signed out by:     Marianne Godfrey MD     CLINICAL HISTORY:   right orbital mass     GROSS:   The specimen is received labeled \"right orbital biopsy\" and consists of   red-tan nodule measuring 1.5 x 0.9 x 0.6 cm with one smooth side and   opposite rough side consistent with periosteum.  The specimen is   bisected revealing homogenous pale tan fleshy cut surface.  Touch   preparations are prepared, air dried and fixed, portion of specimen is   submitted in RPMI for possible lymphoma workup [facility],   ([email or web address] [employer], [place], [place] ).  The remainder is entirely submitted.   (Dictated by: Marianne Godfrey MD 3/15/2016 04:45 PM)     MICROSCOPIC:     Specimen consists of portion of lacrimal gland with acute and chronic   inflammation.  Focal area of microabscess formation associated with   small areas of necrosis are also present.  Inflammation is seen   extending to the periorbital adipose tissue forming panniculitis.   Specimen is negative for malignancy.  Samples sent for immunophenotyping   to [facility], ([employer], [place], [place] ) reveals no " evidence   of monoclonality or aberrant antigen expression.  A GMS (fungal) stain   is pending and results will be reported as an addendum.     CPT Codes:   A: 82263-IW8, 79544-MDXDN, SOH, 31409-SBGBI, 58067-QGXO     TESTING LAB LOCATION:   [address]  [id number] [phone]     COLLECTION SITE:   Client: [facility]   Location: [facility] (S)            Component      Latest Ref Rng 4/29/2016   Nucleated RBCs      0 /100 0   Absolute Neutrophil      1.6 - 8.3 10e9/L 8.9 (H)   Absolute Lymphocytes      0.8 - 5.3 10e9/L 3.2   Absolute Monocytes      0.0 - 1.3 10e9/L 0.8   Absolute Eosinophils      0.0 - 0.7 10e9/L 0.2   Absolute Basophils      0.0 - 0.2 10e9/L 0.1   Abs Immature Granulocytes      0 - 0.4 10e9/L 0.1   Absolute Nucleated RBC       0.0   IGG      695 - 1620 mg/dL 836   IgG1      300 - 856 mg/dL 280 (L)   IgG2      158 - 761 mg/dL 277   IgG3      24 - 192 mg/dL 35   IgG4      11 - 86 mg/dL 16   RNP Antibody IgG      0.0 - 0.9 AI <0.2 . . .   Smith SUNNY Antibody IgG      0.0 - 0.9 AI <0.2 . . .   SSA (Ro) (SUNNY) Antibody, IgG      0.0 - 0.9 AI <0.2 . . .   SSB (La) (SUNNY) Antibody, IgG      0.0 - 0.9 AI <0.2 . . .   Scleroderma Antibody Scl-70 SUNNY IgG      0.0 - 0.9 AI <0.2 . . .   Cholesterol      <200 mg/dL 154   Triglycerides      <150 mg/dL 220 (H)   HDL Cholesterol      >49 mg/dL 42 (L)   LDL Cholesterol Calculated      <100 mg/dL 67   Non HDL Cholesterol      <130 mg/dL 111   M Tuberculosis Result      NEG Negative   M Tuberculosis Antigen Value       0.00   Albumin      3.4 - 5.0 g/dL 4.3   ALT      0 - 50 U/L 30   AST      0 - 45 U/L 10   Complement C3      76 - 169 mg/dL 157   Complement C4      15 - 50 mg/dL 32   CRP Inflammation      0.0 - 8.0 mg/L <2.9   Sed Rate      0 - 20 mm/h 2   DNA-ds      <10 IU/mL 1   Cyclic Citrullinated Peptide Antibody, IgG      <7 U/mL 1   Rheumatoid Factor      <20 IU/mL <20   Proteinase 3  Antibody IgG      0.0 - 0.9 AI <0.2 . . .   Myeloperoxidase Antibody IgG      0.0 - 0.9 AI 2.5 (H)   Vitamin D Deficiency screening      20 - 75 ug/L 24   Hemoglobin A1C      4.3 - 6.0 % 7.9 (H)   ALLI by IFA IgG       1:40 (A) . . .     Component      Latest Ref Rng & Units 4/7/2017   WBC      4.0 - 11.0 10e9/L 11.3 (H)   RBC Count      3.8 - 5.2 10e12/L 4.77   Hemoglobin      11.7 - 15.7 g/dL 12.5   Hematocrit      35.0 - 47.0 % 38.7   MCV      78 - 100 fl 81   MCH      26.5 - 33.0 pg 26.2 (L)   MCHC      31.5 - 36.5 g/dL 32.3   RDW      10.0 - 15.0 % 13.2   Platelet Count      150 - 450 10e9/L 347   Diff Method       Automated Method   % Neutrophils      % 67.1   % Lymphocytes      % 22.9   % Monocytes      % 6.2   % Eosinophils      % 3.0   % Basophils      % 0.4   % Immature Granulocytes      % 0.4   Nucleated RBCs      0 /100 0   Absolute Neutrophil      1.6 - 8.3 10e9/L 7.5   Absolute Lymphocytes      0.8 - 5.3 10e9/L 2.6   Absolute Monocytes      0.0 - 1.3 10e9/L 0.7   Absolute Eosinophils      0.0 - 0.7 10e9/L 0.3   Absolute Basophils      0.0 - 0.2 10e9/L 0.1   Abs Immature Granulocytes      0 - 0.4 10e9/L 0.1   Absolute Nucleated RBC       0.0   IGG      695 - 1620 mg/dL 962   IgG1      300 - 856 mg/dL Test sent to [facility]. See ARMISC.   IgG2      158 - 761 mg/dL Test sent to [facility]. See ARMISC.   IgG3      24 - 192 mg/dL Test sent to [facility]. See ARMISC.   IgG4      11 - 86 mg/dL Test sent to [facility]. See ARMISC.   Result       SEE NOTE . . .   Test Name       IGG SUBCLASSES   Send Outs Misc Test Code       80671   Send Outs Misc Test Specimen       Serum   Creatinine      0.52 - 1.04 mg/dL 1.20 (H)   GFR Estimate      >60 mL/min/1.7m2 47 (L)   GFR Estimate If Black      >60 mL/min/1.7m2 57 (L)   Creatinine Urine      mg/dL    Albumin Urine mg/L      mg/L    Albumin Urine mg/g Cr      0 - 25 mg/g Cr    Myeloperoxidase Antibody IgG      0.0 - 0.9 AI 2.9 (H)   Proteinase 3 Antibody IgG      0.0 - 0.9 AI <0.2 . . .    Neutrophil Cytoplasmic IgG Antibody       1:80 (A) . . .   Angiotensin Converting Enzyme       <5 (L) . . .   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      Comparison: MRI brain 5/29/2013      Technique:   Orbits: Axial and coronal T1-weighted, and coronal T2-weighted images  obtained without intravenous contrast. Post-intravenous contrast  (using gadolinium) sagittal FLAIR, were obtained with fat-saturation,  focused on the orbits.  Brain: Axial susceptibility-weighted and FLAIR sequences were obtained  of the whole brain without intravenous contrast, and postcontrast  axial T1-weighted images were obtained through the whole brain.   Contrast: 7.5mL Gadavist     Findings:  New asymmetric enlargement of the right lacrimal gland and enhancement  of the right lacrimal gland with surrounding ill-defined crescentic T2  hyperintense signal and enhancement within the right superior  superotemporal orbit, predominantly involving the extraconal space  with slight intraconal extension. No definite associated restricted  diffusion. No discrete extraocular muscle enlargement. Normal  symmetric optic nerve signal. Cavernous sinuses and orbital apices are  normal. Globes are normal.     Whole brain images are symmetric minimal leukoaraiosis and generalized  parenchymal volume loss. Ventricles are not enlarged. No intracranial  hemorrhage, mass effect, midline shift or abnormal extra  axial fluid  collection. No abnormal foci of intracranial enhancement or restricted  diffusion. Paranasal sinuses and mastoid air cells are clear.            Impression:    New abnormal enlargement of the right lacrimal gland with surrounding  ill-defined T2 hyperintense signal and enhancement centered in the  superotemporal right orbital fat, which may represent   infectious/inflammatory dacryadenitis, orbital pseudotumor, lymphoma,  carcinoma, sarcoidosis or IgG4 disease..     I have personally reviewed the examination and initial interpretation  and I agree with the findings.     PATRICIA BRANTLEY MD      Component      Latest Ref Rng & Units 6/1/2017   WBC      4.0 - 11.0 10e9/L 12.0 (H)   RBC Count      3.8 - 5.2 10e12/L 5.18   Hemoglobin      11.7 - 15.7 g/dL 13.8   Hematocrit      35.0 - 47.0 % 41.0   MCV      78 - 100 fl 79   MCH      26.5 - 33.0 pg 26.6   MCHC      31.5 - 36.5 g/dL 33.7   RDW      10.0 - 15.0 % 14.8   Platelet Count      150 - 450 10e9/L 322   Diff Method       Automated Method   % Neutrophils      % 76.7   % Lymphocytes      % 16.5   % Monocytes      % 4.6   % Eosinophils      % 1.9   % Basophils      % 0.3   Absolute Neutrophil      1.6 - 8.3 10e9/L 9.2 (H)   Absolute Lymphocytes      0.8 - 5.3 10e9/L 2.0   Absolute Monocytes      0.0 - 1.3 10e9/L 0.6   Absolute Eosinophils      0.0 - 0.7 10e9/L 0.2   Absolute Basophils      0.0 - 0.2 10e9/L 0.0   Color Urine       Yellow   Appearance Urine       Clear   Glucose Urine      NEG mg/dL Negative   Bilirubin Urine      NEG Negative   Ketones Urine      NEG mg/dL Negative   Specific Gravity Urine      1.003 - 1.035 1.010   pH Urine      5.0 - 7.0 pH 5.5   Protein Albumin Urine      NEG mg/dL Negative   Urobilinogen Urine      0.2 - 1.0 EU/dL 0.2   Nitrite Urine      NEG Negative   Blood Urine      NEG Negative   Leukocyte Esterase Urine      NEG Negative   Source       Midstream Urine   WBC Urine      0 - 2 /HPF O - 2   RBC Urine      0 - 2  "/HPF O - 2   Squamous Epithelial /LPF Urine      FEW /LPF Few   Bacteria Urine      NEG /HPF Few (A)   Creatinine      0.52 - 1.04 mg/dL 1.19 (H)   GFR Estimate      >60 mL/min/1.7m2 48 (L)   GFR Estimate If Black      >60 mL/min/1.7m2 58 (L)   Protein Random Urine      g/L <0.05   Protein Total Urine g/gr Creatinine      0 - 0.2 g/g Cr Unable to calculate due to low value   Myeloperoxidase Antibody IgG      0.0 - 0.9 AI 1.9 (H)   Proteinase 3 Antibody IgG      0.0 - 0.9 AI <0.2 . . .   ALT      0 - 50 U/L 29   AST      0 - 45 U/L 18   Albumin      3.4 - 5.0 g/dL 4.6   CRP Inflammation      0.0 - 8.0 mg/L 6.1   Sed Rate      0 - 30 mm/h 13   Creatinine Urine Random      mg/dL 54   Neutrophil Cytoplasmic IgG Antibody       <1:20 . . .   Creatinine Urine      mg/dL 54       Patient Name: FAVIO MARTINEZ   MR#: [id number]   Specimen #: [id number]   Collected: 8/4/2017   Received: 8/4/2017   Reported: 8/9/2017 16:19   Ordering Phy(s): CRISTHIAN FLORES     For improved result formatting, select 'View Enhanced Report Format'   under Linked Documents section.     SPECIMEN(S):   Eye, right lacrimal gland     FINAL DIAGNOSIS:   Eye, right, \"lacrimal gland\", biopsy   - Dense fibrosis and patchy inflammation   - No residual lacrimal gland seen     COMMENT:   Sections show tissue involved by dense fibrosis and patchy inflammation.   There is no morphologic or immunohistochemical evidence of lymphoma. By   separate report, concurrent flow cytometry studies ([id number]),   performed at the [facility], show polytypic B cells and no   aberrant immunophenotype on T cells. Immunohistochemical stains for IgG   and IgG-4 were performed, which provide no support for IgG-4-related   disease. Some of these changes may be related to prior surgery. Sjögren   disease is not excluded. There is no evidence of malignancy. Dr. Max Conway has reviewed this case and concurs.     Electronically signed out by:     Antonella Beth M.D. "   INDICATION:  Right eye edema. Reported history of right orbital biopsy yielding pathology consistent with idiopathic inflammation.    TECHNIQUE:  Noncontrast CT images were obtained through the brain and facial bones.    COMPARISON:  CT sinus 03/27/2017, MRI brain and orbits 02/15/2016.     FINDINGS:  CT facial bones:   Large soft tissue lesion centered within the lateral intraconal and extraconal right orbit has significantly increased in size compared to the CT dated 03/27/2017. The lesion is inseparable from the right superior, lateral, and inferior rectus muscles, as well as the right lacrimal gland. The lesion demonstrates extension posteriorly slightly proximal to the orbital apex, as well as extension into the medial orbit superiorly and anteriorly. Mass effect includes medial bowing of the optic nerve sheath complex and pronounced proptosis of the right globe.  No mass is identified in the right orbit.  Torus palatinus.   Minimal mucosal thickening in the ethmoid air cells. The remainder of the visualized paranasal sinuses are clear.  CT brain:  The ventricles and sulci within normal limits for patient age. No mass effect or midline shift. The gray-white differentiation is maintained.  No acute intracranial hemorrhage or pathologic extra-axial fluid collection.  The calvarium is intact. The mastoid air cells are clear.    IMPRESSION:  1. Large soft tissue lesion centered within the lateral intraconal and extraconal right orbit has significantly increased in size compared to the CT dated 03/27/2017. The lesion is inseparable from the right lacrimal gland and rectus musculature. Mass effect includes medial bowing of the optic nerve sheath complex and pronounced proptosis of the right globe. Given provided history, findings may represent a progressive inflammatory etiology (pseudotumor). Other differential considerations, such as sarcoidosis or lymphoma, could also have this appearance.  2. No acute  intracranial hemorrhage or intracranial mass effect.    Please note that all CT scans at this facility use dose modulation, iterative reconstruction, and/or weight-based dosing when appropriate to reduce radiation dose to as low as reasonably achievable.    Dictated by Charles Ward MD @ Jul 16 2018  3:54PM    Signed by Dr. Charles Ward @ Jul 16 2018  4:20PM    CT ORBITAL WO CONTRAST 7/11/2019 3:42 PM     History: orbital pseudotumor; Subjective visual disturbance; Visual  field defect; Idiopathic orbital inflammatory syndrome  ICD-10: Subjective visual disturbance; Visual field defect; Idiopathic  orbital inflammatory syndrome     Comparison: CT 3/6/2019 and 7/16/2018, MRI brain 5/9/2017                                                                   Impression:   1. 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  2. Slightly decreased right orbital proptosis since 3/6/2019.  Component      Latest Ref Rng & Units 10/29/2019   WBC      4.0 - 11.0 10e9/L 11.6 (H)   RBC Count      3.8 - 5.2 10e12/L 4.73   Hemoglobin      11.7 - 15.7 g/dL 12.3   Hematocrit      35.0 - 47.0 % 39.1   MCV      78 - 100 fl 83   MCH      26.5 - 33.0 pg 26.0 (L)   MCHC      31.5 - 36.5 g/dL 31.5   RDW      10.0 - 15.0 % 13.8   Platelet Count      150 - 450 10e9/L 371   Diff Method       Automated Method   % Neutrophils      % 75.6   % Lymphocytes      % 14.8   % Monocytes      % 5.3   % Eosinophils      % 3.5   % Basophils      % 0.5   % Immature Granulocytes      % 0.3   Nucleated RBCs      0 /100 0   Absolute Neutrophil      1.6 - 8.3 10e9/L 8.8 (H)   Absolute Lymphocytes      0.8 - 5.3 10e9/L 1.7   Absolute Monocytes      0.0 - 1.3 10e9/L 0.6   Absolute Eosinophils      0.0 - 0.7 10e9/L 0.4   Absolute Basophils      0.0 - 0.2 10e9/L 0.1   Abs Immature  Granulocytes      0 - 0.4 10e9/L 0.0   Absolute Nucleated RBC       0.0   Color Urine       Yellow   Appearance Urine       Clear   Glucose Urine      NEG:Negative mg/dL 50 (A)   Bilirubin Urine      NEG:Negative Negative   Ketones Urine      NEG:Negative mg/dL 5 (A)   Specific Gravity Urine      1.003 - 1.035 1.023   Blood Urine      NEG:Negative Negative   pH Urine      5.0 - 7.0 pH 5.0   Protein Albumin Urine      NEG:Negative mg/dL Negative   Urobilinogen mg/dL      0.0 - 2.0 mg/dL 0.0   Nitrite Urine      NEG:Negative Negative   Leukocyte Esterase Urine      NEG:Negative Negative   Source       Unspecified Urine   WBC Urine      0 - 5 /HPF <1   RBC Urine      0 - 2 /HPF 2   Mucous Urine      NEG:Negative /LPF Present (A)   Hyaline Casts      0 - 2 /LPF 5 (H)   IGG      695 - 1,620 mg/dL 682 (L)   IGA      70 - 380 mg/dL 238   IGM      60 - 265 mg/dL 46 (L)   Creatinine      0.52 - 1.04 mg/dL 1.04   GFR Estimate      >60 mL/min/1.73:m2 61   GFR Estimate If Black      >60 mL/min/1.73:m2 71   Neutrophil Cytoplasmic Antibody      <1:10 titer <1:10   Neutrophil Cytoplasmic Antibody Pattern       The ANCA IFA is <1:10.  No further testing will be performed.   CD19 B Cells      6 - 27 % <1 (L)   Absolute CD19      107 - 698 cells/uL <1 (L)   Protein Random Urine      g/L 0.26   Protein Total Urine g/gr Creatinine      0 - 0.2 g/g Cr 0.13   Myeloperoxidase Antibody IgG      0.0 - 0.9 AI 1.2 (H)   Proteinase 3 Antibody IgG      0.0 - 0.9 AI <0.2   Magnesium      1.6 - 2.3 mg/dL 1.7   Ferritin      8 - 252 ng/mL 67   Vitamin B12      193 - 986 pg/mL 592   Vitamin D Deficiency screening      20 - 75 ug/L 29   Sed Rate      0 - 30 mm/h 10   CRP Inflammation      0.0 - 8.0 mg/L <2.9   Albumin      3.4 - 5.0 g/dL 4.3   ALT      0 - 50 U/L 26   AST      0 - 45 U/L 17   Creatinine Urine      mg/dL 207     Component      Latest Ref Rng & Units 12/5/2019   WBC      4.0 - 11.0 10e9/L    RBC Count      3.8 - 5.2 10e12/L     Hemoglobin      11.7 - 15.7 g/dL    Hematocrit      35.0 - 47.0 %    MCV      78 - 100 fl    MCH      26.5 - 33.0 pg    MCHC      31.5 - 36.5 g/dL    RDW      10.0 - 15.0 %    Platelet Count      150 - 450 10e9/L    Diff Method          % Neutrophils      %    % Lymphocytes      %    % Monocytes      %    % Eosinophils      %    % Basophils      %    % Immature Granulocytes      %    Nucleated RBCs      0 /100    Absolute Neutrophil      1.6 - 8.3 10e9/L    Absolute Lymphocytes      0.8 - 5.3 10e9/L    Absolute Monocytes      0.0 - 1.3 10e9/L    Absolute Eosinophils      0.0 - 0.7 10e9/L    Absolute Basophils      0.0 - 0.2 10e9/L    Abs Immature Granulocytes      0 - 0.4 10e9/L    Absolute Nucleated RBC          Color Urine          Appearance Urine          Glucose Urine      NEG:Negative mg/dL    Bilirubin Urine      NEG:Negative    Ketones Urine      NEG:Negative mg/dL    Specific Gravity Urine      1.003 - 1.035    Blood Urine      NEG:Negative    pH Urine      5.0 - 7.0 pH    Protein Albumin Urine      NEG:Negative mg/dL    Urobilinogen mg/dL      0.0 - 2.0 mg/dL    Nitrite Urine      NEG:Negative    Leukocyte Esterase Urine      NEG:Negative    Source          WBC Urine      0 - 5 /HPF    RBC Urine      0 - 2 /HPF    Mucous Urine      NEG:Negative /LPF    Hyaline Casts      0 - 2 /LPF    IGG      695 - 1,620 mg/dL 616 (L)   IGA      70 - 380 mg/dL 214   IGM      60 - 265 mg/dL 33 (L)   Creatinine      0.52 - 1.04 mg/dL    GFR Estimate      >60 mL/min/1.73:m2    GFR Estimate If Black      >60 mL/min/1.73:m2    Neutrophil Cytoplasmic Antibody      <1:10 titer    Neutrophil Cytoplasmic Antibody Pattern          CD19 B Cells      6 - 27 % <1   Absolute CD19      107 - 698 cells/uL <1   Protein Random Urine      g/L    Protein Total Urine g/gr Creatinine      0 - 0.2 g/g Cr    Myeloperoxidase Antibody IgG      0.0 - 0.9 AI    Proteinase 3 Antibody IgG      0.0 - 0.9 AI    Magnesium      1.6 - 2.3 mg/dL     Ferritin      8 - 252 ng/mL    Vitamin B12      193 - 986 pg/mL    Vitamin D Deficiency screening      20 - 75 ug/L    Sed Rate      0 - 30 mm/h    CRP Inflammation      0.0 - 8.0 mg/L    Albumin      3.4 - 5.0 g/dL    ALT      0 - 50 U/L    AST      0 - 45 U/L    Creatinine Urine      mg/dL      Component      Latest Ref Rng & Units 6/18/2020   WBC      4.0 - 11.0 10e9/L 11.1 (H)   RBC Count      3.8 - 5.2 10e12/L 4.42   Hemoglobin      11.7 - 15.7 g/dL 11.7   Hematocrit      35.0 - 47.0 % 35.9   MCV      78 - 100 fl 81   MCH      26.5 - 33.0 pg 26.5   MCHC      31.5 - 36.5 g/dL 32.6   RDW      10.0 - 15.0 % 13.2   Platelet Count      150 - 450 10e9/L 348   Diff Method       Automated Method   % Neutrophils      % 94.1   % Lymphocytes      % 4.5   % Monocytes      % 0.7   % Eosinophils      % 0.1   % Basophils      % 0.3   % Immature Granulocytes      % 0.3   Nucleated RBCs      0 /100 0   Absolute Neutrophil      1.6 - 8.3 10e9/L 10.5 (H)   Absolute Lymphocytes      0.8 - 5.3 10e9/L 0.5 (L)   Absolute Monocytes      0.0 - 1.3 10e9/L 0.1   Absolute Eosinophils      0.0 - 0.7 10e9/L 0.0   Absolute Basophils      0.0 - 0.2 10e9/L 0.0   Abs Immature Granulocytes      0 - 0.4 10e9/L 0.0   Absolute Nucleated RBC       0.0   Color Urine       Gwen   Appearance Urine       Slightly Cloudy   Glucose Urine      NEG:Negative mg/dL Negative   Bilirubin Urine      NEG:Negative Negative   Ketones Urine      NEG:Negative mg/dL 5 (A)   Specific Gravity Urine      1.003 - 1.035 1.030   Blood Urine      NEG:Negative Negative   pH Urine      5.0 - 7.0 pH 5.0   Protein Albumin Urine      NEG:Negative mg/dL 30 (A)   Urobilinogen mg/dL      0.0 - 2.0 mg/dL 0.0   Nitrite Urine      NEG:Negative Negative   Leukocyte Esterase Urine      NEG:Negative Negative   Source       Midstream Urine   WBC Urine      0 - 5 /HPF 3   RBC Urine      0 - 2 /HPF 3 (H)   Squamous Epithelial /HPF Urine      0 - 1 /HPF 1   Mucous Urine      NEG:Negative  /LPF Present (A)   Hyaline Casts      0 - 2 /LPF 38 (H)   IGG      610 - 1,616 mg/dL 689   IGA      84 - 499 mg/dL 224   IGM      35 - 242 mg/dL 41   Creatinine      0.52 - 1.04 mg/dL 1.83 (H)   GFR Estimate      >60 mL/min/1.73:m2 31 (L)   GFR Estimate If Black      >60 mL/min/1.73:m2 36 (L)   Neutrophil Cytoplasmic Antibody      <1:10 titer <1:10   Neutrophil Cytoplasmic Antibody Pattern       The ANCA IFA is <1:10.  No further testing will be performed.   CD19 B Cells      6 - 27 % <1   Absolute CD19      107 - 698 cells/uL <1   Myeloperoxidase Antibody IgG      0.0 - 0.9 AI 1.2 (H)   Proteinase 3 Antibody IgG      0.0 - 0.9 AI <0.2   Protein Random Urine      g/L 0.73   Protein Total Urine g/gr Creatinine      0 - 0.2 g/g Cr 0.19   Vitamin D Deficiency screening      20 - 75 ug/L 34   Sed Rate      0 - 30 mm/h 18   CRP Inflammation      0.0 - 8.0 mg/L 4.8   Albumin      3.4 - 5.0 g/dL 3.6   AST      0 - 45 U/L 23   ALT      0 - 50 U/L 30   Creatinine Urine      mg/dL 385     ROS:  A comprehensive ROS was done, positives are per HPI.        HISTORY REVIEW:  Past Medical History:   Diagnosis Date     Abnormal glandular Papanicolaou smear of cervix 1992     Allergic rhinitis     Allergy, airborne subst     Arthritis      ASCVD (arteriosclerotic cardiovascular disease)      Chronic pain      Chronic pancreatitis (H)     idiopathic, Tx: PPI, antioxidants, pancreatic enzymes     Common migraine      Coronary artery disease      Costochondritis      Difficulty in walking(719.7)      Dyspnea on exertion      Ectasia, mammary duct     [facility], persistent nipple discharge     Elevated fasting glucose      Gastro-oesophageal reflux disease      Hernia      History of angina      Hyperlipidemia LDL goal < 100      Hypertension goal BP (blood pressure) < 140/90     Essential hypertension     Iron deficiency anemia      Ischemic cardiomyopathy      Menorrhagia      Migraine headaches      Mild persistent  asthma      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 Matias, neg w/u, Rx'd w/prednisone     Ventral hernia, unspecified, without mention of obstruction or gangrene 2012     Past Surgical History:   Procedure Laterality Date     C ECHO HEART XTHORACIC,COMPLETE, W/O DOPPLER  04    [facility] WNL, EF 60%     C/SECTION, LOW TRANSVERSE           CARDIAC SURGERY      cardiac stent- recent in 16; 4 other stents     DILATION AND CURETTAGE N/A 2016    Procedure: DILATION AND CURETTAGE;  Surgeon: Nahed Butler MD;  Location: [facility]     HC UGI ENDOSCOPY W EUS  08    Dr. Pastrana, possible chronic pancreatitis, fatty liver     HERNIA REPAIR  2012    done at [facility] [place]     INSERT INTRAUTERINE DEVICE N/A 2016    Procedure: INSERT INTRAUTERINE DEVICE;  Surgeon: Nahed Butler MD;  Location: [facility]     INT UTERINE FIBRIOD EMBOLIZATION  10/29/2014     LAPAROSCOPIC CHOLECYSTECTOMY  08    Dr. Arnol GRUBBS TX, CERVICAL      s/p LEEP     ORBITOTOMY Right 3/15/2016    Procedure: ORBITOTOMY;  Surgeon: Myron Cyr MD;  Location: [facility]     ORBITOTOMY Right 2017    Procedure: ORBITOTOMY;  RIGHT ORBITOTOMY AND BIOPSY;  Surgeon: Charis Holbrook MD;  Location: [facility]     REPAIR PTOSIS Right 2017    Procedure: REPAIR PTOSIS;   RIGHT UPPER LID PTOSIS REPAIR;  Surgeon: Myron Cyr MD;  Location: [facility]     UPPER GI ENDOSCOPY  10/21/08    mild gastritis, Dr. Hidalgo     Family History   Problem Relation Age of Onset     Heart Disease Father 50        heart attack     Cerebrovascular Disease Father      Diabetes Father      Hypertension Father      Depression Father      C.A.D. Father      Hypertension Mother      Arthritis Mother      Heart Disease Mother         long qt syndrome     Thyroid Disease Mother      C.A.D. Mother      Heart Disease Brother 15        MI at 15, 16.      Diabetes Maternal Uncle      Hypertension Maternal Aunt      Hypertension Maternal Uncle      Arthritis Brother         he passed away, had severe arthritis at age 11     Arthritis Maternal Uncle      Eye Disorder Maternal Uncle         cataracts     Neurologic Disorder Sister         migraines     Neurologic Disorder Sister         migraines     Respiratory Son         asthma     Cerebrovascular Disease Maternal Uncle      C.A.D. Brother      Family History Negative Other         neg for RA, SLE     Unknown/Adopted No family hx of         unknown neurological issues in her family, mother was adopted     Skin Cancer No family hx of         No known family hx of skin cancer     Social History     Socioeconomic History     Marital status: Single     Spouse name: Not on file     Number of children: 1     Years of education: Not on file     Highest education level: Not on file   Occupational History     Employer: NONE    Social Needs     Financial resource strain: Not on file     Food insecurity     Worry: Not on file     Inability: Not on file     Transportation needs     Medical: Not on file     Non-medical: Not on file   Tobacco Use     Smoking status: Current Every Day Smoker     Packs/day: 0.20     Years: 1.00     Pack years: 0.20     Types: Cigarettes     Last attempt to quit: 2016     Years since quittin.9     Smokeless tobacco: Never  Used   Substance and Sexual Activity     Alcohol use: No     Alcohol/week: 0.0 standard drinks     Drug use: No     Sexual activity: Not Currently   Lifestyle     Physical activity     Days per week: Not on file     Minutes per session: Not on file     Stress: Not on file   Relationships     Social connections     Talks on phone: Not on file     Gets together: Not on file     Attends [employer]ism service: Not on file     Active member of club or organization: Not on file     Attends meetings of clubs or organizations: Not on file     Relationship status: Not on file     Intimate partner violence     Fear of current or ex partner: Not on file     Emotionally abused: Not on file     Physically abused: Not on file     Forced sexual activity: Not on file   Other Topics Concern     Parent/sibling w/ CABG, MI or angioplasty before 65F 55M? Yes   Social History Narrative    Balanced Diet - sometimes    Osteoporosis Prevention Measures - Dairy servings per day: 2 servings weekly    Regular Exercise -  Yes Describe walking 4 times a week    Dental Exam - NO    Seatbelts used - Yes    Self Breast Exam - Yes    Abuse: Current or Past (Physical, Sexual or Emotional)- No    Do you have any concerns about STD's -  No    Do you feel safe in your environment - No    Guns stored in the home - No    Sunscreen used - Yes    Lipids -  YES - Date: 053102    Glucose -  YES - Date: 012804    Eye Exam - YES - Date: one year ago    Colon Cancer Screening - No    Hemoccults - NO    Pap Test -  YES - Date: 070904, remote history of LEEP    Mammography - YES - Date: last spring, would like to discuss, needs [facility]    DEXA - NO    Immunizations reviewed and up to date - Yes, last td given in 1997 or 1998     Patient Active Problem List   Diagnosis     Seasonal affective disorder (H)     Allergic rhinitis     PCOS (polycystic ovarian syndrome)     Moderate persistent asthma     Chronic pancreatitis (H)      Hypertension goal BP (blood pressure) < 140/90     Common migraine     NSTEMI (non-ST elevated myocardial infarction) (H)     Hyperlipidemia LDL goal <70     ASCVD (arteriosclerotic cardiovascular disease)     GERD (gastroesophageal reflux disease)     Ischemic cardiomyopathy     Hypertensive heart disease     Uterine leiomyoma     Iron deficiency anemia     Costochondritis     Vitamin D deficiency     Breast cancer screening     Spinal stenosis in cervical region     Fibromyalgia     Seronegative rheumatoid arthritis (H)     Type 2 diabetes, HbA1C goal < 8% (H)     Type 2 diabetes mellitus with other specified complication (H)     Hyperlipidemia associated with type 2 diabetes mellitus (H)     Hypertension associated with diabetes (H)     Overweight with body mass index (BMI) 25.0-29.9     Tobacco use disorder     Telogen effluvium     CAD S/P percutaneous coronary angioplasty     Status post coronary angiogram     ANCA-associated vasculitis (H)     Wegener's vasculitis (H)     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 All misscarriages were in first trimester. H/o OC use in the past. 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    PMHx, FHx, SHx were reviewed, unchanged.      Outpatient Encounter Medications as of 1/15/2021   Medication Sig Dispense Refill     acetaminophen (TYLENOL) 325 MG tablet Take 1-2 tablets (325-650 mg) by mouth every 6 hours as needed for pain (headache) 250 tablet 0     albuterol (2.5 MG/3ML) 0.083% neb solution INL 1 VIAL VIA NEBULIZATION Q 4 TO 6 HOURS PRN  1     albuterol (PROAIR HFA, PROVENTIL HFA, VENTOLIN HFA) 108 (90 BASE) MCG/ACT inhaler Inhale 2 puffs into the lungs every 6 hours as needed for shortness of breath / dyspnea or wheezing 3 Inhaler 1     ASPIRIN LOW DOSE 81 MG EC tablet TAKE 1 TABLET(81 MG) BY MOUTH DAILY 30 tablet 11     blood glucose monitoring (NO BRAND SPECIFIED) meter device kit Use to  test blood sugar 4 X times daily or as directed. (Patient taking differently: 1 kit Use to test blood sugar 4 X times daily or as directed.) 1 kit 0     blood glucose monitoring (NO BRAND SPECIFIED) test strip 1 strip by In Vitro route 4 times daily Test as directed. Please dispense three months and three months of refills. Thank you. (Patient taking differently: 1 strip by In Vitro route 4 times daily Test as directed. Please dispense three months and three months of refills. Thank you.) 360 each 3     Blood Pressure Monitor KIT 1 each daily Monitor home blood pressure as instructed by physician.  Dispense [place] blood pressure kit. 1 kit 0     calcium carbonate (TUMS) 500 MG chewable tablet Take 3-4 chew tab by mouth daily as needed.       clopidogrel (PLAVIX) 75 MG tablet Take 1 tablet (75 mg) by mouth daily 30 tablet 11     COMPRESSION STOCKINGS 2 each daily Apply one 10-15 mmHg compression stocking to each lower extgmierty during the day and remove before bedtime. 4 each 2     cyclobenzaprine (FLEXERIL) 10 MG tablet Take 1 tablet (10 mg) by mouth 2 times daily as needed for muscle spasms 60 tablet 2     cycloSPORINE (RESTASIS) 0.05 % ophthalmic emulsion Place 1 drop into both eyes 2 times daily (Patient not taking: Reported on 7/1/2020) 60 each 11     cycloSPORINE (RESTASIS) 0.05 % ophthalmic emulsion Place 1 drop into the right eye every 12 hours       desonide (DESOWEN) 0.05 % cream Apply topically 2 times daily       diclofenac (VOLTAREN) 1 % topical gel Apply 2 g topically 4 times daily Apply to affected joints (Patient not taking: Reported on 7/1/2020) 100 g 3     dicyclomine (BENTYL) 20 MG tablet TAKE 1 TABLET(20 MG) BY MOUTH FOUR TIMES DAILY AS NEEDED. Pls schedule clinic appt for refills. 60 tablet 0     diphenhydrAMINE (BENADRYL) 25 MG capsule TK 1 TO 2 CS PO QHS  4     EPIPEN 2-RIKY 0.3 MG/0.3ML injection INJECT 0.3 MG INTO THE MUSCLE PRF ANAPHYALAXIS  0     ferrous gluconate (FERGON) 324 (38 Fe) MG  tablet Take 1 tablet (324 mg) by mouth 2 times daily (with meals) DO NOT CRUSH. Absorbed best on an empty stomach. If stomach upset occurs, can take with meals. For additional refills, please schedule a follow-up appointment with Dr Solano at [phone] (Patient not taking: Reported on 7/1/2020) 180 tablet 0     fexofenadine (ALLEGRA) 180 MG tablet Take 1 tablet by mouth daily as needed. (Patient not taking: Reported on 7/1/2020) 90 tablet 3     fluocinolone (SYNALAR) 0.01 % solution Apply topically daily as needed       fluocinonide (LIDEX) 0.05 % external ointment Apply topically as needed       fluticasone-vilanterol (BREO ELLIPTA) 100-25 MCG/INH inhaler Inhale 1 puff into the lungs daily       folic acid (FOLVITE) 1 MG tablet Take 1 tablet by mouth daily (Patient not taking: Reported on 10/29/2019) 90 tablet 3     hydroxychloroquine (PLAQUENIL) 200 MG tablet Take 2 tablets (400 mg) by mouth daily (Annual eye exam/plaquenil screening) 180 tablet 1     insulin pen needle (BD MARCK U/F) 32G X 4 MM USE DAILY OR AS DIRECTED 300 each 3     ketoconazole (NIZORAL) 2 % shampoo Apply topically daily as needed       Ketoprofen POWD 1 g 2 times daily as needed (prn pain) (Patient not taking: Reported on 7/1/2020) 500 g 3     lidocaine (LMX4) 4 % external cream Apply topically once as needed for mild pain (prn pain) (Patient not taking: Reported on 7/1/2020) 30 g 3     lifitegrast (XIIDRA) 5 % opthalmic solution Place 1 drop into both eyes 2 times daily (Patient not taking: Reported on 7/1/2020) 20 each 3     lisinopril-hydrochlorothiazide (ZESTORETIC) 20-25 MG tablet Take 1 tablet by mouth daily 30 tablet 6     Magnesium Oxide -Mg Supplement 250 MG TABS TK 1 T PO BID. REDUCE IF STOOLS LOOSEN  11     metFORMIN (GLUCOPHAGE-XR) 500 MG 24 hr tablet TAKE 2 TABLETS(1000 MG) BY MOUTH DAILY WITH DINNER (Patient taking differently: Take 2,000 mg by mouth daily ) 180 tablet 0     metoclopramide (REGLAN) 10 MG tablet Take 1 tablet  (10 mg) by mouth 4 times daily (before meals and nightly) 90 tablet 0     metoprolol succinate ER (TOPROL-XL) 100 MG 24 hr tablet Take 1 tablet (100 mg) by mouth daily 30 tablet 11     metroNIDAZOLE (NORITATE) 1 % cream Apply topically daily       montelukast (SINGULAIR) 10 MG tablet Take 1 tablet (10 mg) by mouth At Bedtime 30 tablet 1     Multiple Vitamin (DAILY-GERALD) TABS Take 1 tablet by mouth daily  0     nitroGLYcerin (NITROSTAT) 0.4 MG sublingual tablet Place 1 tablet (0.4 mg) under the tongue every 5 minutes as needed for chest pain . After 3 doses, if pain persists call 911. 25 tablet 3     nystatin (MYCOSTATIN) 755935 UNITS TABS tablet TK 2 TS PO BID  0     ondansetron (ZOFRAN-ODT) 8 MG ODT tab Take 1 tablet (8 mg) by mouth every 8 hours as needed for nausea 60 tablet 1     pantoprazole (PROTONIX) 40 MG EC tablet Take 1 tablet (40 mg) by mouth daily Pork free tablets. (Patient taking differently: Take 40 mg by mouth as needed Pork free tablets.) 30 tablet 1     pravastatin (PRAVACHOL) 40 MG tablet Take 1 tablet (40 mg) by mouth daily 30 tablet 11     ranitidine (ZANTAC) 150 MG tablet Take 1 tablet (150 mg) by mouth 2 times daily (Patient not taking: Reported on 10/29/2019) 180 tablet 1     spironolactone (ALDACTONE) 25 MG tablet Take 1 tablet (25 mg) by mouth daily TAKE 1 TABLET BY MOUTH EVERY DAY TAKE ADDITIONAL 1/2 TABLET BY MOUTH EVERY DAY AS NEEDED FOR WEIGHT GAIN 30 tablet 11     sucralfate (CARAFATE) 1 GM tablet Take 1 tablet (1 g) by mouth 4 times daily 360 tablet 0     traMADol (ULTRAM) 50 MG tablet Take 1 tablet (50 mg) by mouth every 8 hours as needed for moderate pain 60 tablet 3     Triamcinolone Acetonide (NASACORT ALLERGY 24HR NA)        vitamin D2 (ERGOCALCIFEROL) 75193 units (1250 mcg) capsule Take 1 capsule (50,000 Units) by mouth every 7 days (Patient not taking: Reported on 7/1/2020) 12 capsule 0     vitamin D3 (CHOLECALCIFEROL) 2000 units (50 mcg) tablet Take 1 tablet (2,000 Units) by  mouth daily (Patient not taking: Reported on 7/1/2020) 90 tablet 1     No facility-administered encounter medications on file as of 1/15/2021.        Allergies   Allergen Reactions     Amoxicillin-Pot Clavulanate      Augmentin      Unknown possible hives and edema     Azithromycin      Diatrizoate Other (See Comments)     Pt wants this listed because she is allergic to shellfish      Imitrex [Sumatriptan]      Severe face/neck/chest tightness and flushing side effects      Penicillins Hives     Unknown      Pork Allergy      Stomach pain, cramping, diarrhea, itching, nausea and headaches     Shellfish Allergy Hives and Swelling     Sulfa Drugs Hives and Swelling     Zithromax [Azithromycin Dihydrate] Swelling     Unknown          Ph.E:    Not done, phone visit    Assessment/ plan:    1-Seropositive non-erosive RA (arthritis, AM stiffness, high CRP, RF 26 but re-check neg 3/2015 on HCQ) Dx 5/2013, FMS, new pleuritic CP 3/20-15-5/2015 resolved on steroids. GPA. She is on  mg bid since 5/2014. She tolerates it well and it's helping some but not enough to control all her pain. Continues having flare ups of joint pain, swelling also has FMS. Joint sx are getting worse. Had high ESR/CRP in 3/2015 and new pleuritic CP between 3/2015-5/2015 which resolved after taking medrol dose pack prescribed 5/20/2015. Her pleuritic CP could be related to her RA. Then stopped tramadol as it blames it for recent episodes of CP.    In the past, MTX was recommended, she declined.    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 Etiology of mass unclear, but it's inflammatory and related to pt's underlying autoimmune disease (inflammatory arthritis, serositis). 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    Her  work up showed borderline + ALLI and slightly elevated MPO. I told her prednisone is not good for her diabetes and weight gain. Recommended a trial of MTX as next step. Discussed risks on details. I called her neuro-ophthalmologist at last visit who agreed with trial of MTX (she suspects pseudo-sarcoidosis or sarcoid like disease but no granuloma and ACE not elevated).     Unfortunately despite all my efforts to explain risks vs benefits (more than risks) of MTX as steroid sparing gent, Janine declined to try MTX. I was very concerned about her R orbital inflammatory mass as there is high chance of flare up off steroids and steroid causes her weigh gain and uncontrolled diabetes. I even offered her other steroid sparing gents like imuran. She declined that as well.    Her inflammation around R orbit has recurred now. On 4/7/2017, I spent quite amount of time discussing a trial of MTX. After discussing risks/benefits, she agreed to try it.     She is s/p Tx with MTX 10 mg po qwk 4/2017-5/2017. No benefits and more GI SEs, more hair loss and headaches since start of MTX. No benefits. I called and spoke with her neuro-ophthalmologist who recommended a second opinion from neuro-ophthalmology at the . I suspect her presentation to be ANCA vasculitis related but wanted to repeat imaging and get neuro-ophthalmology eval here. She was recommended to have repeat biopsy to r/o lymphoma.    In 5/2017, prescribed her prednisone 40-30-20-10 mg a day each for 3 days then stop (she declined higher dose and longer taper despite my concern for worsening swelling around orbit), Her R campos-orbital edema significantly improved. MTX was stopped in 5/2017 given side effects. Plan was to start imuran 50 mg po qd (NL TPMT); however we decided to hold off till she gets biopsy done.    Repeat R lacrimal gland biopsy in 8/2017 showed non specific inflammation, it ruled out lymphoma. Her R orbital swelling is improved but still present. After  her biopsy I contacted her to schedule a f/u visit with me to discuss plan of care but she declined till today's visit.    She did not tolerate AZA because of GI SEs.    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     This is definitely an inflammatory process and is steroid responsive, she had local kenalog inj in 8/2017 at the time of biopsy which has helped. Given her diabetes and long term SE of prednisone, highly recommend steroid sparing agent to prevent progression/recurrence of R campos-orbital swelling. Since she did not tolerate MTX and AZA, recommend rituximab as next step.     In 2/2018, I spent 30 minutes discussing test results, plan of care, rituximab SEs including rare risk of PML. She declined rituximab with concern for SEs.    Unfortunately lost f/u sine 2/2018. In 9/2018, was back with her R eye being much worse, swelling around R orbit was severe and is pushing down her eye. She decided to see an ophthalmologist at [place], was seen 8/29, was told to have GPA.    Janine is frustrated with her eye condition, saying nobody told her that she has GPA or Wegener's which is a serious condition and people could die from it.    I reminded her that I discussed the Dx of ANCA-vasculitis which is just another name for Wegener's with her many times but she always declined induction Tx rituximab at last visit in 9/2018. I put her on prednisone 30 mg every day in 9/2018, now she is off, stopped 6 wk after surgery. Does not like SEs including worsening BG.    CT chest/abdomen/pelvis 4/2017 was neg for malignancy. 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 Repeat MPO was positive in 6/2017.    She is s/p lateral orbitotomy and debulking orbital mass right eye on 9/26/18 with   [facility] and Dr. Valdez at [place]. 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 Not happy with results, on my exam, her eye swelling/pain significantly improved. She wants to transfer care to here, I advised her to make an appointment with Dr. Saulo GREEN for f/u and referred her to Dr. Vasquez to assess if she has sinus involvement by GPA given facial pain.    After my original recommendation to receive rituximab (FDA approved for GPA) in 2/2018. She finally agreed to it, had 1 gr q2wk x 2 on 12/12/2018 and 12/26/2018. Had minor allergic reaction which was managed by extra dose of steroid and benadryl. She refuses to do prednisone taper given h/o diabetes, GIB on prednisone. I told her it would take 1 mo for rituximab to show benefit, if she continues to have active disease, recommend trial of cytoxan.     Patient received Rituxan again (6/3/19 & 6/17/19) 6 months after first round and last on 12/5/2019 and 12/19/2019. Repeat CT scan show continued inflammatory changes in the R orbit, but decreased proptosis. We discussed cytoxan again but she said it is out of question and she declined considering it. Therefore, we continued with q6mo rituximab with hope that inflammation goes down. Patient is not on any prednisone. 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 Per eye exam 1/2020, responding to rituximab, no longer has proptosis, will continue with rituximab. Last rituximab was 1 gr on 6/18/2020, next is due on 7/2/2020.    Stable GPA. Stable labs.    Today's plan:    Return in late 9/2020 or early 10/2020 to discuss next dose of rituximab (very slow to avoid reaction)     mg bid with annual eye exam    Follow up with Dr. Vernon     Lidocaine cream and ketoprofen powder for knee pain      Continue accupuncture (referral was placed as it helps with chronic pain).     My impression is that her arthralgias are a combination of IA, fibromyalgia and chronic pain.  Stopped HCQ at last visit, shortly after resumed taking it  as arthralgia recurred. Continue HCQ. Eye exam is updated.    2-Fad pads. She was seen by endocrinology and cushing was ruled out in 4/2014. Was advised to do f/u for enlarged thyroid/thyroid nodules.    3-Hair loss. F/U with dermatology    4-Chronic pain. F/U with pain clinic    5-Concerns of subclinical hyperthyroidism: TSH is barely minimal, chart review shows that those lowest levels are since April 2014. At last visit, discussed Endocrinology ref which pt wants to hold off in this visit.     6-Vit D deficiency. Was replaced with vit D 50, 000 units po qwk x 12 wk then 2000 units every day    7-Upper extremity swelling. Recent mammogram without suggestion of malignancy. No LAD of axillary region. Arms appear normal on physical exam, however patient reports intermittent swelling.  - Referral to lymphedema clinic was done in the past.      PLAN: Return in 3-4 months       MEDICATION CHANGES: as above          Phone visit: 15 min    Majo Mckinnon MD      No orders of the defined types were placed in this encounter.        Janine is a 55 year old who is being evaluated via a billable telephone visit.      What phone number would you like to be contacted at? [phone]  How would you like to obtain your AVS? Mail a copy  Phone call duration: *** minutes    Again, thank you for allowing me to participate in the care of your patient.      Sincerely,    Majo Mckinnon MD

## 2021-01-15 NOTE — PROGRESS NOTES
Rheumatology F/U Virtual Visit Note    Last visit note:7/1/2020    Today's visit date: 1/15/2021    Reason for visit: F/U GPA    (this is a phone visit due to COVID-19 outbreak), patient agreed      HPI     Ms. 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 She is on HCQ since 5/2014. On rituximab since 12/20218 with great response. Previously tried and did not tolerate MTX, AZA.      She had lateral orbitotomy and debulking orbital mass right eye on 9/26/18 with Dr. Shah and Dr. Valdez at [place]. 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 There were no complications according to the op note.    Last rituximab was 1 gr on 6/18/2020, 7/2/2020.    Today 1/15/2021: Not feeling well today. She has been feeling poorly since Monday. Somebody was sick around her.  She started coughing on Monday, it has been a dry cough. Once or twice had clear sputum, no hemoptysis. Has stickle in her throat, had some discomfort in her chest yesterday, it is better today.  No fevers. Feeling achy.    Towards the end of 3-4 months of rituximab, her eye starts to swell up. Gets more swelling of hands, feet and starts getting headaches.    Wrists, R knee, feet are swollen. sometimes it's hard to get up. This is going on x past 1-2 wk.        Component      Latest Ref Rng 2/28/2013 2/28/2013          12:14 PM 12:14 PM   WBC      4.0 - 11.0 10e9/L     RBC Count      3.8 - 5.2 10e12/L     Hemoglobin      11.7 - 15.7 g/dL     Hematocrit      35.0 - 47.0 %     MCV      78 - 100 fl     MCH      26.5 - 33.0 pg     MCHC      31.5 - 36.5 g/dL     RDW      10.0 - 15.0 %     Platelet Count      150 - 450 10e9/L     Specimen Description           Lyme Screen IgG and IgM           Vitamin D Deficiency screening      30 - 75 ug/L     Ferritin      10 - 300 ng/mL     Sed Rate      0 - 20 mm/h     ALLI Screen by EIA      <1.0      Rheumatoid Factor      0 - 14 IU/mL     CK Total      30 - 225 U/L  78   Uric Acid      2.5 - 7.5 mg/dL 6.7      Component      Latest Ref Rng 2/28/2013          12:14 PM   WBC      4.0 - 11.0 10e9/L    RBC Count      3.8 - 5.2 10e12/L    Hemoglobin      11.7 - 15.7 g/dL    Hematocrit      35.0 - 47.0 %    MCV      78 - 100 fl    MCH      26.5 - 33.0 pg    MCHC      31.5 - 36.5 g/dL    RDW      10.0 - 15.0 %    Platelet Count      150 - 450 10e9/L    Specimen Description       Serum   Lyme Screen IgG and IgM       Test value: <0.75....Interpretation: Negative....If you highly suspect Lyme . . .   Vitamin D Deficiency screening      30 - 75 ug/L    Ferritin      10 - 300 ng/mL    Sed Rate      0 - 20 mm/h    ALLI Screen by EIA      <1.0    Rheumatoid Factor      0 - 14 IU/mL    CK Total      30 - 225 U/L    Uric Acid      2.5 - 7.5 mg/dL      Component      Latest Ref Rng 2/28/2013 2/28/2013 2/28/2013 2/28/2013          12:14 PM 12:14 PM 12:14 PM 12:14 PM   WBC      4.0 - 11.0 10e9/L       RBC Count      3.8 - 5.2 10e12/L       Hemoglobin      11.7 - 15.7 g/dL       Hematocrit      35.0 - 47.0 %       MCV      78 - 100 fl       MCH      26.5 - 33.0 pg       MCHC      31.5 - 36.5 g/dL       RDW      10.0 - 15.0 %       Platelet Count      150 - 450 10e9/L       Specimen Description             Lyme Screen IgG and IgM             Vitamin D Deficiency screening      30 - 75 ug/L       Ferritin      10 - 300 ng/mL    10   Sed Rate      0 - 20 mm/h   23 (H)    ALLI Screen by EIA      <1.0  <1.0 . . .     Rheumatoid Factor      0 - 14 IU/mL 26 (H)      CK Total      30 - 225 U/L       Uric Acid      2.5 - 7.5 mg/dL         5/2013  CBC WITH PLATELETS DIFFERENTIAL       Component Value Range    WBC 11.3 (*) 4.0 - 11.0 10e9/L    RBC Count 4.56  3.8 - 5.2 10e12/L    Hemoglobin 11.1 (*) 11.7 - 15.7 g/dL    Hematocrit 34.3 (*) 35.0 - 47.0 %    MCV 75 (*) 78 - 100 fl    MCH 24.3 (*) 26.5 - 33.0 pg    MCHC 32.4  31.5 - 36.5 g/dL     RDW 16.1 (*) 10.0 - 15.0 %    Platelet Count 315  150 - 450 10e9/L    Diff Method Automated Method      % Neutrophils 71.6  40 - 75 %    % Lymphocytes 20.9  20 - 48 %    % Monocytes 4.3  0 - 12 %    % Eosinophils 2.8  0 - 6 %    % Basophils 0.2  0 - 2 %    % Immature Granulocytes 0.2  0 - 0.4 %    Absolute Neutrophil 8.1  1.6 - 8.3 10e9/L    Absolute Lymphocytes 2.4  0.8 - 5.3 10e9/L    Absolute Monoctyes 0.5  0.0 - 1.3 10e9/L    Absolute Eosinophils 0.3  0.0 - 0.7 10e9/L    Absolute Basophils 0.0  0.0 - 0.2 10e9/L    Abs Immature Granulocytes 0.0  0 - 0.03 10e9/L   AMYLASE       Component Value Range    Amylase 103  30 - 110 U/L   COMPREHENSIVE METABOLIC PANEL       Component Value Range    Sodium 144  133 - 144 mmol/L    Potassium 3.8  3.4 - 5.3 mmol/L    Chloride 105  94 - 109 mmol/L    Carbon Dioxide 23  20 - 32 mmol/L    Anion Gap 17  6 - 17 mmol/L    Glucose 173 (*) 60 - 99 mg/dL    Urea Nitrogen 13  5 - 24 mg/dL    Creatinine 0.83  0.52 - 1.04 mg/dL    GFR Estimate 74  >60 mL/min/1.7m2    GFR Estimate If Black 90  >60 mL/min/1.7m2    Calcium 9.7  8.5 - 10.4 mg/dL    Bilirubin Total 0.4  0.2 - 1.3 mg/dL    Albumin 4.3  3.9 - 5.1 g/dL    Protein Total 7.8  6.8 - 8.8 g/dL    Alkaline Phosphatase 66  40 - 150 U/L    ALT 36  0 - 50 U/L    AST 28  0 - 45 U/L   CK TOTAL       Component Value Range    CK Total 66  30 - 225 U/L   CRP INFLAMMATION       Component Value Range    CRP Inflammation 10.4 (*) 0.0 - 8.0 mg/L   LIPASE       Component Value Range    Lipase 353 (*) 20 - 250 U/L   ERYTHROCYTE SEDIMENTATION RATE AUTO       Component Value Range    Sed Rate 26 (*) 0 - 20 mm/h   ROUTINE UA WITH MICROSCOPIC REFLEX TO CULTURE       Component Value Range    Color Urine Yellow      Appearance Urine Slightly Cloudy      Glucose Urine 30 (*) NEG mg/dL    Bilirubin Urine Negative  NEG    Ketones Urine 5 (*) NEG mg/dL    Specific Gravity Urine 1.026  1.003 - 1.035    Blood Urine Trace (*) NEG    pH Urine 5.0  5.0 - 7.0  pH    Protein Albumin Urine 10 (*) NEG mg/dL    Urobilinogen mg/dL Normal  0.0 - 2.0 mg/dL    Nitrite Urine Negative  NEG    Leukocyte Esterase Urine Negative  NEG    Source Midstream Urine      WBC Urine 1  0 - 2 /HPF    RBC Urine 4 (*) 0 - 2 /HPF    Squamous Epithelial /HPF Urine <1  0 - 1 /HPF    Mucous Urine Present (*) NEG /LPF    Hyaline Casts 3 (*) 0 - 2 /LPF    Calcium Oxalate Moderate (*) NEG /HPF   COMPLEMENT C3       Component Value Range    Complement C3 143  76 - 169 mg/dL   COMPLEMENT C4       Component Value Range    Complement C4 31  15 - 50 mg/dL   HEPATITIS C ANTIBODY       Component Value Range    Hepatitis C Antibody Negative  NEG   CARDIOLIPIN ANTIBODY IGG AND IGM       Component Value Range    Cardiolipin IgG Marline    0 - 15.0 GPL    Value: <15.0      Interpretation:  Negative    Cardiolipin IgM Marline    0 - 12.5 MPL    Value: <12.5      Interpretation:  Negative   LUPUS PANEL       Component Value Range    Lupus Result    NEG    Value: Negative      (Note)      COMMENTS:      The INR is normal.      APTT is normal.  1:2 Mix is not indicated.      DRVVT Screen is normal.      Thrombin time is normal.      NEGATIVE TEST; A LUPUS ANTICOAGULANT WAS NOT DETECTED IN THIS      SPECIMEN WITHIN THE LIMITS OF THE TESTING REPERTOIRE.      If the clinical picture is strongly suggestive of an antiphospholipid      syndrome, recommend anticardiolipin and beta-2-glycoprotein (IgG and      IgM) antibody tests.      Leela Fransk M.D.  [phone]      5/2/2013            INR =  0.93 N = 0.86-1.14  (No additional charge)      TT = 15.7 N = 13.0-19.0 sec  (No additional charge)            APTT'S:    Seconds      Reagent =  Stago LA      Normal  =  38      Patient  =  34      1:2 Mix  =  N/A      Reference =  31-43             DILUTE MADELINE VIPER VENOM TEST:      DRVVT Screen Ratio = 0.76 Normal = <1.21         IMMUNOGLOBULIN G SUBCLASSES       Component Value Range    IGG 1030  695 - 1620 mg/dL    IgG1 488   300 - 856 mg/dL    IgG2 325  158 - 761 mg/dL    IgG3 47  24 - 192 mg/dL    IgG4 18  11 - 86 mg/dL   SUNNY ANTIBODY PANEL       Component Value Range    Ribonucleic Protein IgG Antibody 0      Smith Antibody IgG 1      SSA (RO) Antibody IgG 4      SSB (LA) Antibody IgG 0      Scleroderma Antibody IgG 0     BETA 2 GLYCOPROTEIN ANTIBODIES IGG IGM       Component Value Range    Beta-2-Glycopro IgG 1      Beta-2-Glycopro IgM 5     CYCLIC CIT PEPT IGG       Component Value Range    Cyclic Cit Pept IgG/IgA    <20 UNITS    Value: <20      Interpretation:  Negative   DNA DOUBLE STRANDED ANTIBODIES       Component Value Range    DNA-ds    0 - 29 IU/mL    Value: <15      Interpretation:  Negative       CT CHEST PULMONARY EMBOLISM W CONTRAST 5/20/2015 4:57 PM  HISTORY: Pain. SOB. Elevated d-dimer.   TECHNIQUE: 65 mL Isovue 370. Axial images with coronal  reconstructions.  COMPARISON: None.  FINDINGS: Calcified granuloma with surrounding scarring in the  posterolateral left upper lobe. Triangular shaped opacity at the right  lung base in the lateral right middle lobe could represent some  scarring, atelectasis or infiltrate. There is also some scarring or  atelectasis in the posteromedial right lung base. Lungs otherwise  clear.  The pulmonary arteries are well opacified. No CT evidence for acute  pulmonary embolus. No aortic dissection.  Diffuse fatty infiltration of the liver.  IMPRESSION  IMPRESSION:   1. No pulmonary embolus identified.  2. Small focus of atelectasis, infiltrate or scarring in the lateral  right middle lobe.  3. Old granulomatous disease.  4. Otherwise negative.  SILVERIO VAZQUEZ MD    Copath Report      Patient Name: FAVIO MARTINEZ   MR#: [id number]   Specimen #: [id number]   Collected: 3/15/2016   Received: 3/15/2016   Reported: 3/17/2016 13:33   Ordering Phy(s): PARVEEN ENRIQUEZ     ORIGINAL REPORT FOLLOWS ADDENDUM  ADDENDUM     TO ORIGINAL REPORT   Status: Signed Out   Date Ordered:3/18/2016   Date  "Complete:3/18/2016   Date Reported:3/18/2016 12:06   Signed Out By: Marianne Godfrey MD     INTERPRETATION:   This addendum is done to incorporate the results of fungal (GMS) stains   done on the specimen.  Specimen is negative for fungal organisms.  The   original final diagnosis remains unchanged.     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  Negative for malignancy(Please   see microscopic description)     Electronically signed out by:     Marianne Godfrey MD     CLINICAL HISTORY:   right orbital mass     GROSS:   The specimen is received labeled \"right orbital biopsy\" and consists of   red-tan nodule measuring 1.5 x 0.9 x 0.6 cm with one smooth side and   opposite rough side consistent with periosteum.  The specimen is   bisected revealing homogenous pale tan fleshy cut surface.  Touch   preparations are prepared, air dried and fixed, portion of specimen is   submitted in RPMI for possible lymphoma workup [facility],   ([email or web address], [place], [place] ).  The remainder is entirely submitted.   (Dictated by: Marianne Godfrey MD 3/15/2016 04:45 PM)     MICROSCOPIC:     Specimen consists of portion of lacrimal gland with acute and chronic   inflammation.  Focal area of microabscess formation associated with   small areas of necrosis are also present.  Inflammation is seen   extending to the periorbital adipose tissue forming panniculitis.   Specimen is negative for malignancy.  Samples sent for immunophenotyping   to [facility], ([employer], [place], [place] ) reveals no evidence   of monoclonality or aberrant antigen expression.  A GMS (fungal) stain   is pending and results will be reported as an addendum.     CPT Codes:   A: 28130-YL6, 69736-SMGUP, SOH, 84990-VHVHH, 66371-FEWB "     TESTING LAB LOCATION:   [place] [address] [place]  [id number] [phone]     COLLECTION SITE:   Client: [facility]   Location: [facility] (S)            Component      Latest Ref Rng 4/29/2016   Nucleated RBCs      0 /100 0   Absolute Neutrophil      1.6 - 8.3 10e9/L 8.9 (H)   Absolute Lymphocytes      0.8 - 5.3 10e9/L 3.2   Absolute Monocytes      0.0 - 1.3 10e9/L 0.8   Absolute Eosinophils      0.0 - 0.7 10e9/L 0.2   Absolute Basophils      0.0 - 0.2 10e9/L 0.1   Abs Immature Granulocytes      0 - 0.4 10e9/L 0.1   Absolute Nucleated RBC       0.0   IGG      695 - 1620 mg/dL 836   IgG1      300 - 856 mg/dL 280 (L)   IgG2      158 - 761 mg/dL 277   IgG3      24 - 192 mg/dL 35   IgG4      11 - 86 mg/dL 16   RNP Antibody IgG      0.0 - 0.9 AI <0.2 . . .   Smith SUNNY Antibody IgG      0.0 - 0.9 AI <0.2 . . .   SSA (Ro) (SUNNY) Antibody, IgG      0.0 - 0.9 AI <0.2 . . .   SSB (La) (SUNNY) Antibody, IgG      0.0 - 0.9 AI <0.2 . . .   Scleroderma Antibody Scl-70 SUNNY IgG      0.0 - 0.9 AI <0.2 . . .   Cholesterol      <200 mg/dL 154   Triglycerides      <150 mg/dL 220 (H)   HDL Cholesterol      >49 mg/dL 42 (L)   LDL Cholesterol Calculated      <100 mg/dL 67   Non HDL Cholesterol      <130 mg/dL 111   M Tuberculosis Result      NEG Negative   M Tuberculosis Antigen Value       0.00   Albumin      3.4 - 5.0 g/dL 4.3   ALT      0 - 50 U/L 30   AST      0 - 45 U/L 10   Complement C3      76 - 169 mg/dL 157   Complement C4      15 - 50 mg/dL 32   CRP Inflammation      0.0 - 8.0 mg/L <2.9   Sed Rate      0 - 20 mm/h 2   DNA-ds      <10 IU/mL 1   Cyclic Citrullinated Peptide Antibody, IgG      <7 U/mL 1   Rheumatoid Factor      <20 IU/mL <20   Proteinase 3 Antibody IgG      0.0 - 0.9 AI <0.2 . . .   Myeloperoxidase Antibody IgG      0.0 - 0.9 AI 2.5 (H)   Vitamin D Deficiency screening      20 - 75 ug/L 24   Hemoglobin A1C      4.3 - 6.0 % 7.9 (H)   ALLI by IFA IgG        1:40 (A) . . .       ROS: A comprehensive ROS was done. Positives are per HPI.          HISTORY REVIEW:  Past Medical History:   Diagnosis Date     Abnormal glandular Papanicolaou smear of cervix 1992     Allergic rhinitis     Allergy, airborne subst     Arthritis      ASCVD (arteriosclerotic cardiovascular disease)      Chronic pain      Chronic pancreatitis (H)     idiopathic, Tx: PPI, antioxidants, pancreatic enzymes     Common migraine      Coronary artery disease      Costochondritis      Difficulty in walking(719.7)      Dyspnea on exertion      Ectasia, mammary duct     [facility], persistent nipple discharge     Elevated fasting glucose      Gastro-oesophageal reflux disease      Hernia      History of angina      Hyperlipidemia LDL goal < 100      Hypertension goal BP (blood pressure) < 140/90     Essential hypertension     Iron deficiency anemia      Ischemic cardiomyopathy      Menorrhagia      Migraine headaches      Mild persistent asthma      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 Matias, neg w/u, Rx'd w/prednisone     Ventral hernia, unspecified, without mention of obstruction or gangrene 7/2012     Past Surgical History:   Procedure Laterality Date     C ECHO HEART XTHORACIC,COMPLETE, W/O DOPPLER  2/4/04    [facility] WNL, EF 60%      C/SECTION, LOW TRANSVERSE           CARDIAC SURGERY      cardiac stent- recent in 16; 4 other stents     DILATION AND CURETTAGE N/A 2016    Procedure: DILATION AND CURETTAGE;  Surgeon: Nahed Butler MD;  Location: [facility]     HC UGI ENDOSCOPY W EUS  08    Dr. Pastrana, possible chronic pancreatitis, fatty liver     HERNIA REPAIR  2012    [place] [facility] [place]     INSERT INTRAUTERINE DEVICE N/A 2016    Procedure: INSERT INTRAUTERINE DEVICE;  Surgeon: Nahed Butler MD;  Location: [facility]     INT UTERINE FIBRIOD EMBOLIZATION  10/29/2014     LAPAROSCOPIC CHOLECYSTECTOMY  08    Dr. Arnol GRUBBS TX, CERVICAL      s/p LEEP     ORBITOTOMY Right 3/15/2016    Procedure: ORBITOTOMY;  Surgeon: Myron Cyr MD;  [facility]     ORBITOTOMY Right 2017    Procedure: ORBITOTOMY;  RIGHT ORBITOTOMY AND BIOPSY;  Surgeon: Charis Holbrook MD;  [facility]     REPAIR PTOSIS Right 2017    Procedure: REPAIR PTOSIS;  RIGHT UPPER LID PTOSIS REPAIR;  Surgeon: Myron Cyr MD;  Location: [facility]     UPPER GI ENDOSCOPY  10/21/08    mild gastritis, Dr. Hidalgo     Family History   Problem Relation Age of Onset     Heart Disease Father 50        heart attack     Cerebrovascular Disease Father      Diabetes Father      Hypertension Father      Depression Father      C.A.D. Father      Hypertension Mother      Arthritis Mother      Heart Disease Mother         long qt syndrome     Thyroid Disease Mother      C.A.D. Mother      Heart Disease Brother 15        MI at 15, 16.      Diabetes Maternal Uncle      Hypertension Maternal Aunt      Hypertension Maternal Uncle      Arthritis Brother         he passed away, had severe arthritis at age 11     Arthritis Maternal Uncle      Eye Disorder Maternal Uncle         cataracts     Neurologic Disorder Sister         migraines     Neurologic Disorder Sister         migraines     Respiratory Son         asthma     Cerebrovascular  Disease Maternal Uncle      C.A.D. Brother      Family History Negative Other         neg for RA, SLE     Unknown/Adopted No family hx of         unknown neurological issues in her family, mother was adopted     Skin Cancer No family hx of         No known family hx of skin cancer     Social History     Socioeconomic History     Marital status: Single     Spouse name: Not on file     Number of children: 1     Years of education: Not on file     Highest education level: Not on file   Occupational History     Employer: NONE    Social Needs     Financial resource strain: Not on file     Food insecurity     Worry: Not on file     Inability: Not on file     Transportation needs     Medical: Not on file     Non-medical: Not on file   Tobacco Use     Smoking status: Current Every Day Smoker     Packs/day: 0.20     Years: 1.00     Pack years: 0.20     Types: Cigarettes     Last attempt to quit: 2016     Years since quittin.9     Smokeless tobacco: Never Used   Substance and Sexual Activity     Alcohol use: No     Alcohol/week: 0.0 standard drinks     Drug use: No     Sexual activity: Not Currently   Lifestyle     Physical activity     Days per week: Not on file     Minutes per session: Not on file     Stress: Not on file   Relationships     Social connections     Talks on phone: Not on file     Gets together: Not on file     Attends Christian service: Not on file     Active member of club or organization: Not on file     Attends meetings of clubs or organizations: Not on file     Relationship status: Not on file     Intimate partner violence     Fear of current or ex partner: Not on file     Emotionally abused: Not on file     Physically abused: Not on file     Forced sexual activity: Not on file   Other Topics Concern     Parent/sibling w/ CABG, MI or angioplasty before 65F 55M? Yes   Social History Narrative    Balanced Diet - sometimes    Osteoporosis Prevention Measures - Dairy servings per day: 2 servings  weekly    Regular Exercise -  Yes Describe walking 4 times a week    Dental Exam - NO    Seatbelts used - Yes    Self Breast Exam - Yes    Abuse: Current or Past (Physical, Sexual or Emotional)- No    Do you have any concerns about STD's -  No    Do you feel safe in your environment - No    Guns stored in the home - No    Sunscreen used - Yes    Lipids -  YES - Date: 053102    Glucose -  YES - Date: 012804    Eye Exam - YES - Date: one year ago    Colon Cancer Screening - No    Hemoccults - NO    Pap Test -  YES - Date: 070904, remote history of LEEP    Mammography - YES - Date: last spring, would like to discuss, needs a referral to [facility]    DEXA - NO    Immunizations reviewed and up to date - Yes, last td given in 1997 or 1998     Patient Active Problem List   Diagnosis     Seasonal affective disorder (H)     Allergic rhinitis     PCOS (polycystic ovarian syndrome)     Moderate persistent asthma     Chronic pancreatitis (H)     Hypertension goal BP (blood pressure) < 140/90     Common migraine     NSTEMI (non-ST elevated myocardial infarction) (H)     Hyperlipidemia LDL goal <70     ASCVD (arteriosclerotic cardiovascular disease)     GERD (gastroesophageal reflux disease)     Ischemic cardiomyopathy     Hypertensive heart disease     Uterine leiomyoma     Iron deficiency anemia     Costochondritis     Vitamin D deficiency     Breast cancer screening     Spinal stenosis in cervical region     Fibromyalgia     Seronegative rheumatoid arthritis (H)     Type 2 diabetes, HbA1C goal < 8% (H)     Type 2 diabetes mellitus with other specified complication (H)     Hyperlipidemia associated with type 2 diabetes mellitus (H)     Hypertension associated with diabetes (H)     Overweight with body mass index (BMI) 25.0-29.9     Tobacco use disorder     Telogen effluvium     CAD S/P percutaneous coronary angioplasty     Status post coronary angiogram     ANCA-associated vasculitis (H)     Wegener's  vasculitis (H)     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 All misscarriages were in first trimester. H/o OC use in the past. 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    PMHx, FHx, SHx were reviewed, unchanged.      Outpatient Encounter Medications as of 1/15/2021   Medication Sig Dispense Refill     acetaminophen (TYLENOL) 325 MG tablet Take 1-2 tablets (325-650 mg) by mouth every 6 hours as needed for pain (headache) 250 tablet 0     albuterol (2.5 MG/3ML) 0.083% neb solution INL 1 VIAL VIA NEBULIZATION Q 4 TO 6 HOURS PRN  1     albuterol (PROAIR HFA, PROVENTIL HFA, VENTOLIN HFA) 108 (90 BASE) MCG/ACT inhaler Inhale 2 puffs into the lungs every 6 hours as needed for shortness of breath / dyspnea or wheezing 3 Inhaler 1     ASPIRIN LOW DOSE 81 MG EC tablet TAKE 1 TABLET(81 MG) BY MOUTH DAILY 30 tablet 11     blood glucose monitoring (NO BRAND SPECIFIED) meter device kit Use to test blood sugar 4 X times daily or as directed. (Patient taking differently: 1 kit Use to test blood sugar 4 X times daily or as directed.) 1 kit 0     blood glucose monitoring (NO BRAND SPECIFIED) test strip 1 strip by In Vitro route 4 times daily Test as directed. Please dispense three months and three months of refills. Thank you. (Patient taking differently: 1 strip by In Vitro route 4 times daily Test as directed. Please dispense three months and three months of refills. Thank you.) 360 each 3     Blood Pressure Monitor KIT 1 each daily Monitor home blood pressure as instructed by physician.  Dispense [place] blood pressure kit. 1 kit 0     calcium carbonate (TUMS) 500 MG chewable tablet Take 3-4 chew tab by mouth daily as needed.       clopidogrel (PLAVIX) 75 MG tablet Take 1 tablet (75 mg) by mouth daily 30 tablet 11     COMPRESSION STOCKINGS 2 each daily Apply one 10-15 mmHg compression stocking to each lower extgmierty during the day and remove before  bedtime. 4 each 2     cyclobenzaprine (FLEXERIL) 10 MG tablet Take 1 tablet (10 mg) by mouth 2 times daily as needed for muscle spasms 60 tablet 2     cycloSPORINE (RESTASIS) 0.05 % ophthalmic emulsion Place 1 drop into both eyes 2 times daily (Patient not taking: Reported on 7/1/2020) 60 each 11     cycloSPORINE (RESTASIS) 0.05 % ophthalmic emulsion Place 1 drop into the right eye every 12 hours       desonide (DESOWEN) 0.05 % cream Apply topically 2 times daily       diclofenac (VOLTAREN) 1 % topical gel Apply 2 g topically 4 times daily Apply to affected joints (Patient not taking: Reported on 7/1/2020) 100 g 3     dicyclomine (BENTYL) 20 MG tablet TAKE 1 TABLET(20 MG) BY MOUTH FOUR TIMES DAILY AS NEEDED. Pls schedule clinic appt for refills. 60 tablet 0     diphenhydrAMINE (BENADRYL) 25 MG capsule TK 1 TO 2 CS PO QHS  4     EPIPEN 2-RIKY 0.3 MG/0.3ML injection INJECT 0.3 MG INTO THE MUSCLE PRF ANAPHYALAXIS  0     ferrous gluconate (FERGON) 324 (38 Fe) MG tablet Take 1 tablet (324 mg) by mouth 2 times daily (with meals) DO NOT CRUSH. Absorbed best on an empty stomach. If stomach upset occurs, can take with meals. For additional refills, please schedule a follow-up appointment with Dr Solano at [phone] (Patient not taking: Reported on 7/1/2020) 180 tablet 0     fexofenadine (ALLEGRA) 180 MG tablet Take 1 tablet by mouth daily as needed. (Patient not taking: Reported on 7/1/2020) 90 tablet 3     fluocinolone (SYNALAR) 0.01 % solution Apply topically daily as needed       fluocinonide (LIDEX) 0.05 % external ointment Apply topically as needed       fluticasone-vilanterol (BREO ELLIPTA) 100-25 MCG/INH inhaler Inhale 1 puff into the lungs daily       folic acid (FOLVITE) 1 MG tablet Take 1 tablet by mouth daily (Patient not taking: Reported on 10/29/2019) 90 tablet 3     hydroxychloroquine (PLAQUENIL) 200 MG tablet Take 2 tablets (400 mg) by mouth daily (Annual eye exam/plaquenil screening) 180 tablet 1      insulin pen needle (BD MARCK U/F) 32G X 4 MM USE DAILY OR AS DIRECTED 300 each 3     ketoconazole (NIZORAL) 2 % shampoo Apply topically daily as needed       Ketoprofen POWD 1 g 2 times daily as needed (prn pain) (Patient not taking: Reported on 7/1/2020) 500 g 3     lidocaine (LMX4) 4 % external cream Apply topically once as needed for mild pain (prn pain) (Patient not taking: Reported on 7/1/2020) 30 g 3     lifitegrast (XIIDRA) 5 % opthalmic solution Place 1 drop into both eyes 2 times daily (Patient not taking: Reported on 7/1/2020) 20 each 3     lisinopril-hydrochlorothiazide (ZESTORETIC) 20-25 MG tablet Take 1 tablet by mouth daily 30 tablet 6     Magnesium Oxide -Mg Supplement 250 MG TABS TK 1 T PO BID. REDUCE IF STOOLS LOOSEN  11     metFORMIN (GLUCOPHAGE-XR) 500 MG 24 hr tablet TAKE 2 TABLETS(1000 MG) BY MOUTH DAILY WITH DINNER (Patient taking differently: Take 2,000 mg by mouth daily ) 180 tablet 0     metoclopramide (REGLAN) 10 MG tablet Take 1 tablet (10 mg) by mouth 4 times daily (before meals and nightly) 90 tablet 0     metoprolol succinate ER (TOPROL-XL) 100 MG 24 hr tablet Take 1 tablet (100 mg) by mouth daily 30 tablet 11     metroNIDAZOLE (NORITATE) 1 % cream Apply topically daily       montelukast (SINGULAIR) 10 MG tablet Take 1 tablet (10 mg) by mouth At Bedtime 30 tablet 1     Multiple Vitamin (DAILY-GERALD) TABS Take 1 tablet by mouth daily  0     nitroGLYcerin (NITROSTAT) 0.4 MG sublingual tablet Place 1 tablet (0.4 mg) under the tongue every 5 minutes as needed for chest pain . After 3 doses, if pain persists call 911. 25 tablet 3     nystatin (MYCOSTATIN) 153790 UNITS TABS tablet TK 2 TS PO BID  0     ondansetron (ZOFRAN-ODT) 8 MG ODT tab Take 1 tablet (8 mg) by mouth every 8 hours as needed for nausea 60 tablet 1     pantoprazole (PROTONIX) 40 MG EC tablet Take 1 tablet (40 mg) by mouth daily Pork free tablets. (Patient taking differently: Take 40 mg by mouth as needed Pork free tablets.) 30  tablet 1     pravastatin (PRAVACHOL) 40 MG tablet Take 1 tablet (40 mg) by mouth daily 30 tablet 11     ranitidine (ZANTAC) 150 MG tablet Take 1 tablet (150 mg) by mouth 2 times daily (Patient not taking: Reported on 10/29/2019) 180 tablet 1     spironolactone (ALDACTONE) 25 MG tablet Take 1 tablet (25 mg) by mouth daily TAKE 1 TABLET BY MOUTH EVERY DAY TAKE ADDITIONAL 1/2 TABLET BY MOUTH EVERY DAY AS NEEDED FOR WEIGHT GAIN 30 tablet 11     sucralfate (CARAFATE) 1 GM tablet Take 1 tablet (1 g) by mouth 4 times daily 360 tablet 0     traMADol (ULTRAM) 50 MG tablet Take 1 tablet (50 mg) by mouth every 8 hours as needed for moderate pain 60 tablet 3     Triamcinolone Acetonide (NASACORT ALLERGY 24HR NA)        vitamin D2 (ERGOCALCIFEROL) 27978 units (1250 mcg) capsule Take 1 capsule (50,000 Units) by mouth every 7 days (Patient not taking: Reported on 7/1/2020) 12 capsule 0     vitamin D3 (CHOLECALCIFEROL) 2000 units (50 mcg) tablet Take 1 tablet (2,000 Units) by mouth daily (Patient not taking: Reported on 7/1/2020) 90 tablet 1     No facility-administered encounter medications on file as of 1/15/2021.        Allergies   Allergen Reactions     Amoxicillin-Pot Clavulanate      Augmentin      Unknown possible hives and edema     Azithromycin      Diatrizoate Other (See Comments)     Pt wants this listed because she is allergic to shellfish      Imitrex [Sumatriptan]      Severe face/neck/chest tightness and flushing side effects      Penicillins Hives     Unknown      Pork Allergy      Stomach pain, cramping, diarrhea, itching, nausea and headaches     Shellfish Allergy Hives and Swelling     Sulfa Drugs Hives and Swelling     Zithromax [Azithromycin Dihydrate] Swelling     Unknown          Ph.E:    Not done, phone visit    Assessment/ plan:    1-Seropositive non-erosive RA (arthritis, AM stiffness, high CRP, RF 26 but re-check neg 3/2015 on HCQ) Dx 5/2013, FMS, new pleuritic CP 3/20-15-5/2015 resolved on steroids. She  is on  mg bid since 5/2014. She tolerates it well and it's helping some but not enough to control all her pain. Continues having flare ups of joint pain, could be GPA related. Some pain is due to FMS.    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 Prednisone was not a good option for her given weight gain, diabetes. She tried and did not tolerate MTX, AZA.    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 Repeat MPO was positive in 6/2017.    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 Shah and Dr. Valdez at [place]. 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     She is on rituximab since 12/12/2018 with great response, minor allergic reaction which could be managed by pre-meds and extra steroid dose.      Stable GPA but rituximab does not last 6 months, will give it every 5 months. According to ACR guideline could give every 4-6 months. Stable labs.    Today's plan:     mg bid with annual eye exam    Follow up with Dr. Vernon     Rituximab is currently scheduled for 1/19, 2/2/2021.     Labs one month after 2nd rituximab    My impression is that her arthralgias are a combination of RA/ IA sec GPA, fibromyalgia and chronic pain.  Stopped HCQ at last visit, shortly after resumed taking it as arthralgia recurred.     2-Fad pads. She was seen by endocrinology and cushing was ruled out in 4/2014. Was advised to do f/u for enlarged thyroid/thyroid nodules.    3-Hair loss. F/U with dermatology    4-Chronic pain. F/U with pain clinic    5-Concerns of subclinical hyperthyroidism: TSH is barely minimal, chart review shows that those lowest levels are since April 2014. At previous visits, discussed Endocrinology ref which pt wants to hold off in this visit.     6-Vit D deficiency. Was replaced with vit D 50, 000  units po qwk x 12 wk then 2000 units every day    7-Upper extremity swelling. Recent mammogram without suggestion of malignancy. No LAD of axillary region. Arms appear normal on physical exam, however patient reports intermittent swelling.  - Referral to lymphedema clinic was done in the past.    8-Discussed COVID vaccine:    There is no data about use of COVID-19 vaccines in patients with pregnancy, autoimmune disorders or immunosuppressed as they were excluded from the trials. However from all the available information, we think it should be safe and OK to take as it is not a live vaccine. Concerns include potential risk of autoimmune disease flare up or not being very effective.    The data/information about COVID vaccine is evolving, please contact us when the vaccine becomes available to you for an update/recommendation how to handle rheumatology medications around the time of vaccination.    She would think about it and consider it.    8-URI. She is scheduled to have COVID test tomorrow prior to rituximab.      PLAN: Return in 3 months       MEDICATION CHANGES: as above          Phone visit: 25 min    Majo Mckinnon MD      Orders Placed This Encounter   Procedures     AST     ALT     Myeloperoxidase and Proteinase 3 Panel     Albumin level     CBC with platelets differential     Creatinine     CRP inflammation     UA with Microscopic reflex to Culture     Protein  random urine with Creat Ratio     Creatinine random urine     Erythrocyte sedimentation rate auto     CD19 B Cell Count     ANCA IgG by IFA with Reflex to Titer     Immunoglobulins A G and M     Vitamin D Deficiency

## 2021-01-15 NOTE — PROGRESS NOTES
Janine is a 55 year old who is being evaluated via a billable telephone visit.      What phone number would you like to be contacted at? [phone]  How would you like to obtain your AVS? Mail a copy  Phone call duration: 25 minutes

## 2021-01-16 DIAGNOSIS — M31.30 GRANULOMATOSIS WITH POLYANGIITIS, UNSPECIFIED WHETHER RENAL INVOLVEMENT (H): ICD-10-CM

## 2021-01-16 PROCEDURE — U0003 INFECTIOUS AGENT DETECTION BY NUCLEIC ACID (DNA OR RNA); SEVERE ACUTE RESPIRATORY SYNDROME CORONAVIRUS 2 (SARS-COV-2) (CORONAVIRUS DISEASE [COVID-19]), AMPLIFIED PROBE TECHNIQUE, MAKING USE OF HIGH THROUGHPUT TECHNOLOGIES AS DESCRIBED BY CMS-2020-01-R: HCPCS | Performed by: INTERNAL MEDICINE

## 2021-01-16 PROCEDURE — U0005 INFEC AGEN DETEC AMPLI PROBE: HCPCS | Performed by: INTERNAL MEDICINE

## 2021-01-17 RX ORDER — ERGOCALCIFEROL 1.25 MG/1
50000 CAPSULE, LIQUID FILLED ORAL
Qty: 12 CAPSULE | Refills: 0 | Status: SHIPPED | OUTPATIENT
Start: 2021-01-17 | End: 2021-05-10

## 2021-01-17 RX ORDER — HYDROXYCHLOROQUINE SULFATE 200 MG/1
400 TABLET, FILM COATED ORAL DAILY
Qty: 180 TABLET | Refills: 0 | Status: SHIPPED | OUTPATIENT
Start: 2021-01-17 | End: 2021-04-21

## 2021-01-18 LAB
SARS-COV-2 RNA RESP QL NAA+PROBE: NOT DETECTED
SPECIMEN SOURCE: NORMAL

## 2021-01-19 ENCOUNTER — INFUSION THERAPY VISIT (OUTPATIENT)
Dept: INFUSION THERAPY | Facility: CLINIC | Age: 56
End: 2021-01-19
Attending: INTERNAL MEDICINE
Payer: COMMERCIAL

## 2021-01-19 ENCOUNTER — TELEPHONE (OUTPATIENT)
Dept: CALL CENTER | Age: 56
End: 2021-01-19

## 2021-01-19 VITALS
DIASTOLIC BLOOD PRESSURE: 93 MMHG | TEMPERATURE: 98.7 F | RESPIRATION RATE: 16 BRPM | WEIGHT: 166.4 LBS | SYSTOLIC BLOOD PRESSURE: 158 MMHG | HEART RATE: 72 BPM | BODY MASS INDEX: 29.48 KG/M2 | OXYGEN SATURATION: 100 %

## 2021-01-19 DIAGNOSIS — I77.82 ANCA-ASSOCIATED VASCULITIS (H): ICD-10-CM

## 2021-01-19 DIAGNOSIS — M31.30 GRANULOMATOSIS WITH POLYANGIITIS WITHOUT RENAL INVOLVEMENT (H): Primary | ICD-10-CM

## 2021-01-19 PROCEDURE — 96409 CHEMO IV PUSH SNGL DRUG: CPT

## 2021-01-19 PROCEDURE — 250N000011 HC RX IP 250 OP 636: Performed by: INTERNAL MEDICINE

## 2021-01-19 PROCEDURE — 96375 TX/PRO/DX INJ NEW DRUG ADDON: CPT

## 2021-01-19 PROCEDURE — 96374 THER/PROPH/DIAG INJ IV PUSH: CPT

## 2021-01-19 PROCEDURE — 258N000003 HC RX IP 258 OP 636: Performed by: INTERNAL MEDICINE

## 2021-01-19 PROCEDURE — 250N000013 HC RX MED GY IP 250 OP 250 PS 637: Performed by: INTERNAL MEDICINE

## 2021-01-19 PROCEDURE — 96367 TX/PROPH/DG ADDL SEQ IV INF: CPT

## 2021-01-19 RX ORDER — ACETAMINOPHEN 325 MG/1
650 TABLET ORAL ONCE
Status: COMPLETED | OUTPATIENT
Start: 2021-01-19 | End: 2021-01-19

## 2021-01-19 RX ORDER — ACETAMINOPHEN 325 MG/1
650 TABLET ORAL ONCE
Status: CANCELLED
Start: 2021-02-02

## 2021-01-19 RX ORDER — ACETAMINOPHEN 325 MG/1
650 TABLET ORAL ONCE
Status: CANCELLED
Start: 2021-01-19

## 2021-01-19 RX ORDER — METHYLPREDNISOLONE SODIUM SUCCINATE 125 MG/2ML
125 INJECTION, POWDER, LYOPHILIZED, FOR SOLUTION INTRAMUSCULAR; INTRAVENOUS ONCE
Status: CANCELLED
Start: 2021-02-02

## 2021-01-19 RX ORDER — METHYLPREDNISOLONE SODIUM SUCCINATE 125 MG/2ML
125 INJECTION, POWDER, LYOPHILIZED, FOR SOLUTION INTRAMUSCULAR; INTRAVENOUS ONCE
Status: CANCELLED
Start: 2021-01-19

## 2021-01-19 RX ORDER — METHYLPREDNISOLONE SODIUM SUCCINATE 125 MG/2ML
125 INJECTION, POWDER, LYOPHILIZED, FOR SOLUTION INTRAMUSCULAR; INTRAVENOUS ONCE
Status: COMPLETED | OUTPATIENT
Start: 2021-01-19 | End: 2021-01-19

## 2021-01-19 RX ADMIN — RITUXIMAB-ABBS 1000 MG: 10 INJECTION, SOLUTION INTRAVENOUS at 12:21

## 2021-01-19 RX ADMIN — DIPHENHYDRAMINE HYDROCHLORIDE 50 MG: 50 INJECTION, SOLUTION INTRAMUSCULAR; INTRAVENOUS at 11:50

## 2021-01-19 RX ADMIN — ACETAMINOPHEN 650 MG: 325 TABLET ORAL at 11:47

## 2021-01-19 RX ADMIN — METHYLPREDNISOLONE SODIUM SUCCINATE 125 MG: 125 INJECTION, POWDER, FOR SOLUTION INTRAMUSCULAR; INTRAVENOUS at 11:48

## 2021-01-19 RX ADMIN — SODIUM CHLORIDE 250 ML: 9 INJECTION, SOLUTION INTRAVENOUS at 12:28

## 2021-01-19 NOTE — LETTER
1/19/2021         RE: Janine Cornell  [address] [id number]        Dear Colleague,    Thank you for referring your patient, Janine Cornell, to the [facility]. Please see a copy of my visit note below.    Nursing Note  Janine Cornell [facility]: Rituxan  During [facility]'s [facility] appointment, orders from Dr. Mckinnon were completed.  Frequency: today is dose 1 of 2 total; every 2 weeks. Pt has received infusion several times in the past.    Progress note:  Patient identification verified by name and date of birth.  Assessment completed.  Vitals recorded in Doc Flowsheets.  Patient was provided with education regarding medication/procedure and possible side effects.  Patient verbalized understanding.     present during visit today: Not Applicable.    Treatment Conditions: ~~~ NOTE: If the patient answers yes to any of the questions below, hold the infusion and contact ordering provider or on-call provider.    1. Have you recently had an elevated temperature, fever, chills, productive cough, coughing for 3 weeks or longer or hemoptysis, abnormal vital signs, night sweats,  chest pain or have you noticed a decrease in your appetite, unexplained weight loss or fatigue? Yes, night sweats, not new. MD aware  2. Do you have any open wounds or new incisions? No  3. Do you have any recent or upcoming hospitalizations, surgeries or dental procedures? No  4. Do you currently have or recently have had any signs of illness or infection or are you on any antibiotics? No  5. Have you had any new, sudden or worsening abdominal pain? No  6. Have you or anyone in your household received a live vaccination in the past 4 weeks? Please note:  No live vaccines while on biologic/chemotherapy until 6 months after the last treatment.  Patient can receive the flu vaccine (shot only)  and the pneumovax.  It is optimal for the patient to get these vaccines mid cycle, but they can be given at any time as long as it is not on the day of the infusion. No  7. Have you recently been diagnosed with any new nervous system diseases (ie. Multiple sclerosis, Guillain [place], seizures, neurological changes) or cancer diagnosis? No  8. Are you on any form of radiation or chemotherapy? No  9. Are you pregnant or breast feeding or do you have plans of pregnancy in the future? No  10. Have you been having any signs of worsening depression or suicidal ideations?  (benlysta only) No  11. Have there been any other new onset medical symptoms? No      Premedications: administered per order.    Drug Waste Record: No    Infusion length and rate:  infusion starts at 50 ml/hr, then increased by 50 ml/hr every 30 minutes to final rate of 200 ml/hr. (per historical tolerance and pt preference)    Labs: were not ordered for this appointment.    Vascular access: peripheral IV placed today.    Is the next appt scheduled? yes  Asymptomatic COVID test completed? Pt was tested on 1/16/21    Post Infusion Assessment:  Patient tolerated infusion without incident.     Discharge Plan:   Follow up plan of care with: ongoing infusions at [facility].  Discharge instructions were reviewed with patient.  Patient/representative verbalized understanding of discharge instructions and all questions answered.  Patient discharged [facility] in stable condition.    Evelin Campbell RN       Administrations This Visit     0.9% sodium chloride BOLUS     Admin Date  01/19/2021 Action  New Bag Dose  250 mL Route  Intravenous Administered By  Evelin Campbell RN          acetaminophen (TYLENOL) tablet 650 mg     Admin Date  01/19/2021 Action  Given Dose  650 mg Route  Oral Administered By  Evelin Campbell RN          diphenhydrAMINE (BENADRYL) 50 mg in sodium chloride 0.9 % 51 mL  intermittent infusion     Admin Date  01/19/2021 Action  New Bag Dose  50 mg Rate  204 mL/hr Route  Intravenous Administered By  Evelin Campbell RN          methylPREDNISolone sodium succinate (solu-MEDROL) injection 125 mg     Admin Date  01/19/2021 Action  Given Dose  125 mg Route  Intravenous Administered By  Evelin Campbell RN          riTUXimab-abbs (TRUXIMA) 1,000 mg in sodium chloride 0.9 % 1,000 mL infusion for NON-oncology use     Admin Date  01/19/2021 Action  New Bag Dose  1,000 mg Route  Intravenous Administered By  Evelin Campbell RN                Wt 75.5 kg (166 lb 6.4 oz)   BMI 29.48 kg/m          Again, thank you for allowing me to participate in the care of your patient.        Sincerely,        [facility] [employer]

## 2021-01-19 NOTE — TELEPHONE ENCOUNTER
Reviewed test results, shows that COVID is not detected. Called and updated pt, she will go to her infusion today.    Desiree Godinez RN  Rheumatology Clinic

## 2021-01-19 NOTE — TELEPHONE ENCOUNTER
Patient is calling about a covid test she had done on Saturday. She is scheduled for a procedure today at 11 and has not received her covid results back yet so she is unsure if she can proceed with the procedure. This is a procedure that Dr Mckinnon ordered and so she is calling our office wanting to speak to a nurse right away. I tried reaching the Vocera  Twice, but was hung up on both times. Please call patient to discuss whether or not she will be able to proceed with the procedure today.

## 2021-01-19 NOTE — PROGRESS NOTES
Nursing Note  Janine Cornell presents [facility] for: Rituxan  During [facility]'s [facility] appointment, orders from Dr. Mckinnon were completed.  Frequency: today is dose 1 of 2 total; every 2 weeks. Pt has received infusion several times in the past.    Progress note:  Patient identification verified by name and date of birth.  Assessment completed.  Vitals recorded in Doc Flowsheets.  Patient was provided with education regarding medication/procedure and possible side effects.  Patient verbalized understanding.     present during visit today: Not Applicable.    Treatment Conditions: ~~~ NOTE: If the patient answers yes to any of the questions below, hold the infusion and contact ordering provider or on-call provider.    1. Have you recently had an elevated temperature, fever, chills, productive cough, coughing for 3 weeks or longer or hemoptysis, abnormal vital signs, night sweats,  chest pain or have you noticed a decrease in your appetite, unexplained weight loss or fatigue? Yes, night sweats, not new. MD aware  2. Do you have any open wounds or new incisions? No  3. Do you have any recent or upcoming hospitalizations, surgeries or dental procedures? No  4. Do you currently have or recently have had any signs of illness or infection or are you on any antibiotics? No  5. Have you had any new, sudden or worsening abdominal pain? No  6. Have you or anyone in your household received a live vaccination in the past 4 weeks? Please note:  No live vaccines while on biologic/chemotherapy until 6 months after the last treatment.  Patient can receive the flu vaccine (shot only) and the pneumovax.  It is optimal for the patient to get these vaccines mid cycle, but they can be given at any time as long as it is not on the day of the infusion. No  7. Have you recently been diagnosed with any new nervous system diseases (ie. Multiple sclerosis, Guillain  [place], seizures, neurological changes) or cancer diagnosis? No  8. Are you on any form of radiation or chemotherapy? No  9. Are you pregnant or breast feeding or do you have plans of pregnancy in the future? No  10. Have you been having any signs of worsening depression or suicidal ideations?  (benlysta only) No  11. Have there been any other new onset medical symptoms? No      Premedications: administered per order.    Drug Waste Record: No    Infusion length and rate:  infusion starts at 50 ml/hr, then increased by 50 ml/hr every 30 minutes to final rate of 200 ml/hr. (per historical tolerance and pt preference)    Labs: were not ordered for this appointment.    Vascular access: peripheral IV placed today.    Is the next appt scheduled? yes  Asymptomatic COVID test completed? Pt was tested on 1/16/21    Post Infusion Assessment:  Patient tolerated infusion without incident.     Discharge Plan:   Follow up plan of care with: ongoing infusions [facility].  Discharge instructions were reviewed with patient.  Patient/representative verbalized understanding of discharge instructions and all questions answered.  Patient discharged [facility] in stable condition.    Evelin Campbell RN       Administrations This Visit     0.9% sodium chloride BOLUS     Admin Date  01/19/2021 Action  New Bag Dose  250 mL Route  Intravenous Administered By  Evelin Campbell RN          acetaminophen (TYLENOL) tablet 650 mg     Admin Date  01/19/2021 Action  Given Dose  650 mg Route  Oral Administered By  Evelin Campbell RN          diphenhydrAMINE (BENADRYL) 50 mg in sodium chloride 0.9 % 51 mL intermittent infusion     Admin Date  01/19/2021 Action  New Bag Dose  50 mg Rate  204 mL/hr Route  Intravenous Administered By  Evelin Campbell RN          methylPREDNISolone sodium succinate (solu-MEDROL) injection 125 mg     Admin Date  01/19/2021 Action  Given Dose  125 mg  Route  Intravenous Administered By  Evelin Campbell RN          riTUXimab-abbs (TRUXIMA) 1,000 mg in sodium chloride 0.9 % 1,000 mL infusion for NON-oncology use     Admin Date  01/19/2021 Action  New Bag Dose  1,000 mg Route  Intravenous Administered By  Evelin Campbell RN                Wt 75.5 kg (166 lb 6.4 oz)   BMI 29.48 kg/m

## 2021-01-21 DIAGNOSIS — M31.30 WEGENER'S VASCULITIS: ICD-10-CM

## 2021-01-21 RX ORDER — ACETAMINOPHEN 325 MG/1
325-650 TABLET ORAL EVERY 6 HOURS PRN
Qty: 250 TABLET | Refills: 4 | Status: SHIPPED | OUTPATIENT
Start: 2021-01-21

## 2021-02-02 ENCOUNTER — INFUSION THERAPY VISIT (OUTPATIENT)
Dept: INFUSION THERAPY | Facility: CLINIC | Age: 56
End: 2021-02-02
Attending: INTERNAL MEDICINE
Payer: COMMERCIAL

## 2021-02-02 VITALS
TEMPERATURE: 98.6 F | RESPIRATION RATE: 16 BRPM | WEIGHT: 159.8 LBS | SYSTOLIC BLOOD PRESSURE: 116 MMHG | DIASTOLIC BLOOD PRESSURE: 76 MMHG | OXYGEN SATURATION: 99 % | HEART RATE: 81 BPM | BODY MASS INDEX: 28.31 KG/M2

## 2021-02-02 DIAGNOSIS — I77.82 ANCA-ASSOCIATED VASCULITIS (H): ICD-10-CM

## 2021-02-02 DIAGNOSIS — L29.9 ITCHING: ICD-10-CM

## 2021-02-02 DIAGNOSIS — M31.30 GRANULOMATOSIS WITH POLYANGIITIS WITHOUT RENAL INVOLVEMENT (H): Primary | ICD-10-CM

## 2021-02-02 DIAGNOSIS — K59.09 OTHER CONSTIPATION: Primary | ICD-10-CM

## 2021-02-02 PROCEDURE — 96365 THER/PROPH/DIAG IV INF INIT: CPT

## 2021-02-02 PROCEDURE — 96367 TX/PROPH/DG ADDL SEQ IV INF: CPT

## 2021-02-02 PROCEDURE — 999N000128 HC STATISTIC PERIPHERAL IV START W/O US GUIDANCE

## 2021-02-02 PROCEDURE — 96366 THER/PROPH/DIAG IV INF ADDON: CPT

## 2021-02-02 PROCEDURE — 250N000013 HC RX MED GY IP 250 OP 250 PS 637: Performed by: INTERNAL MEDICINE

## 2021-02-02 PROCEDURE — 96415 CHEMO IV INFUSION ADDL HR: CPT

## 2021-02-02 PROCEDURE — 250N000011 HC RX IP 250 OP 636: Performed by: INTERNAL MEDICINE

## 2021-02-02 PROCEDURE — 96413 CHEMO IV INFUSION 1 HR: CPT

## 2021-02-02 PROCEDURE — 96375 TX/PRO/DX INJ NEW DRUG ADDON: CPT

## 2021-02-02 PROCEDURE — 258N000003 HC RX IP 258 OP 636: Performed by: INTERNAL MEDICINE

## 2021-02-02 RX ORDER — ACETAMINOPHEN 325 MG/1
650 TABLET ORAL ONCE
Status: COMPLETED | OUTPATIENT
Start: 2021-02-02 | End: 2021-02-02

## 2021-02-02 RX ORDER — DIPHENHYDRAMINE HCL 25 MG
25 CAPSULE ORAL
Qty: 30 CAPSULE | Refills: 2 | Status: SHIPPED | OUTPATIENT
Start: 2021-02-02 | End: 2023-04-25

## 2021-02-02 RX ORDER — ACETAMINOPHEN 325 MG/1
650 TABLET ORAL ONCE
Status: CANCELLED
Start: 2021-02-02

## 2021-02-02 RX ORDER — METHYLPREDNISOLONE SODIUM SUCCINATE 125 MG/2ML
125 INJECTION, POWDER, LYOPHILIZED, FOR SOLUTION INTRAMUSCULAR; INTRAVENOUS ONCE
Status: COMPLETED | OUTPATIENT
Start: 2021-02-02 | End: 2021-02-02

## 2021-02-02 RX ORDER — SENNOSIDES 8.6 MG
TABLET ORAL
Qty: 60 TABLET | Refills: 1 | Status: SHIPPED | OUTPATIENT
Start: 2021-02-02 | End: 2021-05-11

## 2021-02-02 RX ORDER — METHYLPREDNISOLONE SODIUM SUCCINATE 125 MG/2ML
125 INJECTION, POWDER, LYOPHILIZED, FOR SOLUTION INTRAMUSCULAR; INTRAVENOUS ONCE
Status: CANCELLED
Start: 2021-02-02

## 2021-02-02 RX ADMIN — METHYLPREDNISOLONE SODIUM SUCCINATE 125 MG: 125 INJECTION, POWDER, FOR SOLUTION INTRAMUSCULAR; INTRAVENOUS at 11:10

## 2021-02-02 RX ADMIN — RITUXIMAB-ABBS 1000 MG: 10 INJECTION, SOLUTION INTRAVENOUS at 11:35

## 2021-02-02 RX ADMIN — HYDROCORTISONE SODIUM SUCCINATE 100 MG: 100 INJECTION, POWDER, FOR SOLUTION INTRAMUSCULAR; INTRAVENOUS at 17:15

## 2021-02-02 RX ADMIN — DIPHENHYDRAMINE HYDROCHLORIDE 50 MG: 50 INJECTION, SOLUTION INTRAMUSCULAR; INTRAVENOUS at 11:13

## 2021-02-02 RX ADMIN — ACETAMINOPHEN 650 MG: 325 TABLET ORAL at 10:50

## 2021-02-02 NOTE — PATIENT INSTRUCTIONS
Dear Janine Cornell    Thank you for choosing [facility] ([facility]) for your infusion.  The following information is a summary of our appointment as well as important reminders.      Remember to  prescriptions from pharmacy today. Labs to be rechecked in 1 month.       EDUCATION POST BIOLOGICAL/CHEMOTHERAPY INFUSION  Call the triage nurse at your clinic or seek medical attention if you have chills and/or temperature greater than or equal to 100.5, uncontrolled nausea/vomiting, diarrhea, constipation, dizziness, shortness of breath, chest pain, heart palpitations, weakness or any other new or concerning symptoms, questions or concerns.  You can not have any live virus vaccines prior to or during treatment or up to 6 months post infusion.  If you have an upcoming surgery, medical procedure or dental procedure during treatment, this should be discussed with your ordering physician and your surgeon/dentist.  If you are having any concerning symptom, if you are unsure if you should get your next infusion or wish to speak to a provider before your next infusion, please call your care coordinator or triage nurse at your clinic to notify them so we can adequately serve you.    We look forward in seeing you on your next appointment here at [facility] ([facility]).  Please don t hesitate to call us at [phone] to reschedule any of your appointments or to speak with one of the [facility] registered nurses.  It was a pleasure taking care of you today.    Sincerely,    [facility]  [facility]  [address] [place] [address]  [address]  Phone:  [phone]

## 2021-02-02 NOTE — LETTER
2/2/2021         RE: Janine Cornell  [address] [facility] [id number]        Dear Colleague,    Thank you for referring your patient, Janine Cornell, to the [facility]. Please see a copy of my visit note below.    Nursing Note  Janine Cornell [facility]:   Chief Complaint   Patient presents with     Infusion     IV rituximab     During [facility]'s [facility] appointment, orders from Dr. Mckinnon were completed.  Frequency: today is dose 2 of 2 total    Progress note:  Patient identification verified by name and date of birth.  Assessment completed.  Vitals recorded in Doc Flowsheets.  Patient was provided with education regarding medication/procedure and possible side effects.  Patient verbalized understanding.     present during visit today: Not Applicable.    Treatment Conditions: ~~~ NOTE: If the patient answers yes to any of the questions below, hold the infusion and contact ordering provider or on-call provider.    1. Have you recently had an elevated temperature, fever, chills, productive cough, coughing for 3 weeks or longer or hemoptysis, abnormal vital signs, night sweats,  chest pain or have you noticed a decrease in your appetite, unexplained weight loss or fatigue? Yes, continued night sweats. Provider aware  2. Do you have any open wounds or new incisions? No  3. Do you have any recent or upcoming hospitalizations, surgeries or dental procedures? No  4. Do you currently have or recently have had any signs of illness or infection or are you on any antibiotics? No  5. Have you had any new, sudden or worsening abdominal pain? No  6. Have you or anyone in your household received a live vaccination in the past 4 weeks? Please note:  No live vaccines while on biologic/chemotherapy until 6 months after the last treatment.  Patient can receive the flu vaccine (shot  only) and the pneumovax.  It is optimal for the patient to get these vaccines mid cycle, but they can be given at any time as long as it is not on the day of the infusion. No  7. Have you recently been diagnosed with any new nervous system diseases (ie. Multiple sclerosis, Guillain [place], seizures, neurological changes) or cancer diagnosis? No  8. Are you on any form of radiation or chemotherapy? No  9. Are you pregnant or breast feeding or do you have plans of pregnancy in the future? No  10. Have you been having any signs of worsening depression or suicidal ideations?  (benlysta only) No  11. Have there been any other new onset medical symptoms? Yes, see below.   Pt c/o headache that developed couple days after last rituximab infusion, lasted about 4 days. Also c/o L hand swelling which started several days after last infusion, not at site of IV. Patient stated she experience impaired  strength during that time. Hand swelling has since improved but not completely resolved.  She also stated she started having mild, diffuse itching the night of her last infusion. States she is usually on daily Benadryl and had run out. Still does not have a home supply. Requesting a prescription for Benadryl and a stool softener.   Dr. Mckinnon was paged and above discussed with her. Scripts sent to pharmacy by Dr. Mckinnon. Patient to also have additional 100 mg IV Solu-Cortef at end of rituximab infusion today and to follow up with PCP if constipation does not improve. This information was discussed with patient.     Premedications: administered per order.    Drug Waste Record: No    Infusion length and rate:  infusion given over approximately 5.75 hours.  infusion starts at 50 ml/hr, then increased by 50 ml/hr every 30 minutes to final rate of 200 ml/hr (per historical tolerance and pt preference)    Labs: not drawn, per Dr. Mckinnon- to have drawn 1 month after rituximab completed. Reminded patient.     Vascular access: peripheral  IV was placed by vascular access nurse.    Is the next appt scheduled? No, orders completed.  Asymptomatic COVID test completed? Completed 1/16.    Post Infusion Assessment:  Patient tolerated infusion without incident.  Blood return noted pre and post infusion.  Site patent and intact, free from redness, edema or discomfort.  No evidence of extravasations.  Access discontinued per protocol.     POST-INFUSION OF BIOLOGICAL MEDICATION:  Reviewed with patient.  Given biologic medication or medication hand-out. Inform patient if any fever, chills or signs of infection, new symptoms, abdominal pain, heart palpitations, shortness of breath, reaction, weakness, neurological changes, seek medical attention immediately and should not receive infusions. No live virus vaccines prior to or during treatment or up to 6 months post infusion. If the patient has an upcoming procedure or surgery, this should be discussed with the rheumatologist and surgeon or provider.    Discharge Plan:   AVS given to patient.   Follow up plan of care with: ordering provider.  Discharge instructions were reviewed with patient.  Patient/representative verbalized understanding of discharge instructions and all questions answered.  [facility] in [facility] condition.    Administrations This Visit     acetaminophen (TYLENOL) tablet 650 mg     Admin Date  02/02/2021 Action  Given Dose  650 mg Route  Oral Administered By  Abhay Montoya RN          diphenhydrAMINE (BENADRYL) 50 mg in sodium chloride 0.9 % 51 mL intermittent infusion     Admin Date  02/02/2021 Action  New Bag Dose  50 mg Rate  204 mL/hr Route  Intravenous Administered By  Abhay Montoya RN          hydrocortisone sodium succinate PF (solu-CORTEF) injection 100 mg     Admin Date  02/02/2021 Action  Given Dose  100 mg Route  Intravenous Administered By  Abhay Montoya RN          methylPREDNISolone sodium succinate (solu-MEDROL)  "injection 125 mg     Admin Date  02/02/2021 Action  Given Dose  125 mg Route  Intravenous Administered By  Abhay Montoya, GEORGIE          riTUXimab-abbs (TRUXIMA) 1,000 mg in sodium chloride 0.9 % 1,000 mL infusion for NON-oncology use     Admin Date  02/02/2021 Action  New Bag Dose  1,000 mg Route  Intravenous Administered By  Abhay Montoya, RN              Vital signs:  Temp: 98.6  F (37  C) Temp src: Oral BP: 123/85 Pulse: 69   Resp: 18 SpO2: 99 % O2 Device: None (Room air)     Weight: 72.5 kg (159 lb 12.8 oz)  Estimated body mass index is 28.31 kg/m  as calculated from the following:    Height as of 2/6/20: 1.6 m (5' 3\").    Weight as of this encounter: 72.5 kg (159 lb 12.8 oz).              Again, thank you for allowing me to participate in the care of your patient.        Sincerely,        [facility]    "

## 2021-02-02 NOTE — PROGRESS NOTES
Nursing Note  Janine Cornell [facility] for:   Chief Complaint   Patient presents with     Infusion     IV rituximab     During [facility] appointment, orders from Dr. Mckinnon were completed.  Frequency: today is dose 2 of 2 total    Progress note:  Patient identification verified by name and date of birth.  Assessment completed.  Vitals recorded in Doc Flowsheets.  Patient was provided with education regarding medication/procedure and possible side effects.  Patient verbalized understanding.     present during visit today: Not Applicable.    Treatment Conditions: ~~~ NOTE: If the patient answers yes to any of the questions below, hold the infusion and contact ordering provider or on-call provider.    1. Have you recently had an elevated temperature, fever, chills, productive cough, coughing for 3 weeks or longer or hemoptysis, abnormal vital signs, night sweats,  chest pain or have you noticed a decrease in your appetite, unexplained weight loss or fatigue? Yes, continued night sweats. Provider aware  2. Do you have any open wounds or new incisions? No  3. Do you have any recent or upcoming hospitalizations, surgeries or dental procedures? No  4. Do you currently have or recently have had any signs of illness or infection or are you on any antibiotics? No  5. Have you had any new, sudden or worsening abdominal pain? No  6. Have you or anyone in your household received a live vaccination in the past 4 weeks? Please note:  No live vaccines while on biologic/chemotherapy until 6 months after the last treatment.  Patient can receive the flu vaccine (shot only) and the pneumovax.  It is optimal for the patient to get these vaccines mid cycle, but they can be given at any time as long as it is not on the day of the infusion. No  7. Have you recently been diagnosed with any new nervous system diseases (ie. Multiple sclerosis,  Guillain [place], seizures, neurological changes) or cancer diagnosis? No  8. Are you on any form of radiation or chemotherapy? No  9. Are you pregnant or breast feeding or do you have plans of pregnancy in the future? No  10. Have you been having any signs of worsening depression or suicidal ideations?  (benlysta only) No  11. Have there been any other new onset medical symptoms? Yes, see below.   Pt c/o headache that developed couple days after last rituximab infusion, lasted about 4 days. Also c/o L hand swelling which started several days after last infusion, not at site of IV. Patient stated she experience impaired  strength during that time. Hand swelling has since improved but not completely resolved.  She also stated she started having mild, diffuse itching the night of her last infusion. States she is usually on daily Benadryl and had run out. Still does not have a home supply. Requesting a prescription for Benadryl and a stool softener.   Dr. Mckinnon was paged and above discussed with her. Scripts sent to pharmacy by Dr. Mckinnon. Patient to also have additional 100 mg IV Solu-Cortef at end of rituximab infusion today and to follow up with PCP if constipation does not improve. This information was discussed with patient.     Premedications: administered per order.    Drug Waste Record: No    Infusion length and rate:  infusion given over approximately 5.75 hours.  infusion starts at 50 ml/hr, then increased by 50 ml/hr every 30 minutes to final rate of 200 ml/hr (per historical tolerance and pt preference)    Labs: not drawn, per Dr. Mckinnon- to have drawn 1 month after rituximab completed. Reminded patient.     Vascular access: peripheral IV was placed by vascular access nurse.    Is the next appt scheduled? No, orders completed.  Asymptomatic COVID test completed? Completed 1/16.    Post Infusion Assessment:  Patient tolerated infusion without incident.  Blood return noted pre and post infusion.  Site  patent and intact, free from redness, edema or discomfort.  No evidence of extravasations.  Access discontinued per protocol.     POST-INFUSION OF BIOLOGICAL MEDICATION:  Reviewed with patient.  Given biologic medication or medication hand-out. Inform patient if any fever, chills or signs of infection, new symptoms, abdominal pain, heart palpitations, shortness of breath, reaction, weakness, neurological changes, seek medical attention immediately and should not receive infusions. No live virus vaccines prior to or during treatment or up to 6 months post infusion. If the patient has an upcoming procedure or surgery, this should be discussed with the rheumatologist and surgeon or provider.    Discharge Plan:   AVS given to patient.   Follow up plan of care with: ordering provider.  Discharge instructions were reviewed with patient.  Patient/representative verbalized understanding of discharge instructions and all questions answered.  [facility] in [facility] condition.    Administrations This Visit     acetaminophen (TYLENOL) tablet 650 mg     Admin Date  02/02/2021 Action  Given Dose  650 mg Route  Oral Administered By  Abhay Montoya RN          diphenhydrAMINE (BENADRYL) 50 mg in sodium chloride 0.9 % 51 mL intermittent infusion     Admin Date  02/02/2021 Action  New Bag Dose  50 mg Rate  204 mL/hr Route  Intravenous Administered By  Abhay Montoya RN          hydrocortisone sodium succinate PF (solu-CORTEF) injection 100 mg     Admin Date  02/02/2021 Action  Given Dose  100 mg Route  Intravenous Administered By  Abhay Montoya RN          methylPREDNISolone sodium succinate (solu-MEDROL) injection 125 mg     Admin Date  02/02/2021 Action  Given Dose  125 mg Route  Intravenous Administered By  Abhay Montoya RN          riTUXimab-abbs (TRUXIMA) 1,000 mg in sodium chloride 0.9 % 1,000 mL infusion for NON-oncology use     Admin Date  02/02/2021 Action  New Bag  "Dose  1,000 mg Route  Intravenous Administered By  Abhay Montoya RN              Vital signs:  Temp: 98.6  F (37  C) Temp src: Oral BP: 123/85 Pulse: 69   Resp: 18 SpO2: 99 % O2 Device: None (Room air)     Weight: 72.5 kg (159 lb 12.8 oz)  Estimated body mass index is 28.31 kg/m  as calculated from the following:    Height as of 2/6/20: 1.6 m (5' 3\").    Weight as of this encounter: 72.5 kg (159 lb 12.8 oz).          "

## 2021-04-02 ENCOUNTER — TELEPHONE (OUTPATIENT)
Dept: RHEUMATOLOGY | Facility: CLINIC | Age: 56
End: 2021-04-02

## 2021-04-02 NOTE — TELEPHONE ENCOUNTER
[facility] Call Center    Phone Message    May a detailed message be left on voicemail: yes     Reason for Call: Other: Patient would like to see Dr. Mckinnon sooner than her next available in August. She is stating she needs to be seen in June or July.      Action Taken: Message routed to:  [facility] ([facility] Rheum    Travel Screening: Not Applicable

## 2021-04-05 NOTE — TELEPHONE ENCOUNTER
LMTCB x1    Desiree Godinez, RN  P Scheduling Adult Rheumatology   Caller: Unspecified (3 days ago, 12:10 PM)             She can have a spot on 7/30 at 8 or 8:30.     Thanks Desiree

## 2021-04-06 ENCOUNTER — TELEPHONE (OUTPATIENT)
Dept: RHEUMATOLOGY | Facility: CLINIC | Age: 56
End: 2021-04-06

## 2021-04-06 NOTE — TELEPHONE ENCOUNTER
"Patient called to schedule Ret Video appt with Dr Mckinnon for her Rituxin infusion.   Appt was offered to her for 8/5/21.   Pt was very upset because she states she needs an earlier appt so that she can get her infusion earlier because she is having \"complications and issues\".  Pt would not schedule for 8/5/21.    Thank you  "

## 2021-04-08 NOTE — TELEPHONE ENCOUNTER
Called and updated pt with date and time of appt in April. Have scheduled her for August as well.    Desiree Godinez RN  Rheumatology Clinic

## 2021-04-08 NOTE — TELEPHONE ENCOUNTER
4/21 at 12:30 pm, we will not have any meeting that day. I understand why she is upset. I saw her in 1/2021 and at check out, asked for 3 months follow up which would be April.

## 2021-04-20 NOTE — TELEPHONE ENCOUNTER
Pt was contacted by [facility] scheduling staff on 4/8/21 and was scheduled for an appointment with Dr. Mckinnon for 4/21/21 and 8/20/21.    Does the [facility] scheduling call center still need to reach out to Pt to move up the August appointment?

## 2021-04-20 NOTE — TELEPHONE ENCOUNTER
Pt has been rescheduled for 4/21.  Pt was happy with reschedule.    Desiree Godinez RN  Rheumatology Clinic

## 2021-04-21 ENCOUNTER — VIRTUAL VISIT (OUTPATIENT)
Dept: RHEUMATOLOGY | Facility: CLINIC | Age: 56
End: 2021-04-21
Attending: INTERNAL MEDICINE
Payer: COMMERCIAL

## 2021-04-21 DIAGNOSIS — M79.7 FIBROMYALGIA: ICD-10-CM

## 2021-04-21 DIAGNOSIS — M31.30 GRANULOMATOSIS WITH POLYANGIITIS WITHOUT RENAL INVOLVEMENT (H): Primary | ICD-10-CM

## 2021-04-21 DIAGNOSIS — M19.90 INFLAMMATORY ARTHRITIS: ICD-10-CM

## 2021-04-21 DIAGNOSIS — M35.9 UNDIFFERENTIATED CONNECTIVE TISSUE DISEASE (H): ICD-10-CM

## 2021-04-21 PROCEDURE — 99214 OFFICE O/P EST MOD 30 MIN: CPT | Mod: 95 | Performed by: INTERNAL MEDICINE

## 2021-04-21 ASSESSMENT — PAIN SCALES - GENERAL: PAINLEVEL: SEVERE PAIN (6)

## 2021-04-21 NOTE — PROGRESS NOTES
Janine is a 55 year old who is being evaluated via a billable telephone visit.      What phone number would you like to be contacted at? [phone]  How would you like to obtain your AVS? Kenna  Phone call duration: 27 minutes    Rheumatology F/U Virtual Visit Note    Last visit note: 1/15/2021    Today's visit date: 4/21/2021    Reason for visit: F/U GPA      HPI     Ms. 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 She is on HCQ since 5/2014. On rituximab since 12/20218 with great response. Previously tried and did not tolerate MTX, AZA.      She had lateral orbitotomy and debulking orbital mass right eye on 9/26/18 with Dr. Shah and Dr. Valdez at [facility]. 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 There were no complications according to the op note.      Today 4/21/2021: Had last rituximab 1 gram on 1/19, 2/2/2021. Reports rituximab starts to wear off earlier, has more sx this time. Eye swelling is intermittent. Gets indigestion/ GERD sx with constipation after the infusion.    She reports having asthma, swelling of neck, arms. She thinks that it could be from her vasculitis. She is worried about going down on rituximab dose.     Otherwise unchanged sx, no SOB or hemoptysis or persistent cough, or blood in urine.    Somedays her knees and hips hurt a lot, feel like bone on bone in her knees, especially on changing position.      Component      Latest Ref Rng 2/28/2013 2/28/2013          12:14 PM 12:14 PM   WBC      4.0 - 11.0 10e9/L     RBC Count      3.8 - 5.2 10e12/L     Hemoglobin      11.7 - 15.7 g/dL     Hematocrit      35.0 - 47.0 %     MCV      78 - 100 fl     MCH      26.5 - 33.0 pg     MCHC      31.5 - 36.5 g/dL     RDW      10.0 - 15.0 %     Platelet Count      150 - 450 10e9/L     Specimen Description           Lyme Screen IgG and IgM           Vitamin D Deficiency screening       30 - 75 ug/L     Ferritin      10 - 300 ng/mL     Sed Rate      0 - 20 mm/h     ALLI Screen by EIA      <1.0     Rheumatoid Factor      0 - 14 IU/mL     CK Total      30 - 225 U/L  78   Uric Acid      2.5 - 7.5 mg/dL 6.7      Component      Latest Ref Rng 2/28/2013          12:14 PM   WBC      4.0 - 11.0 10e9/L    RBC Count      3.8 - 5.2 10e12/L    Hemoglobin      11.7 - 15.7 g/dL    Hematocrit      35.0 - 47.0 %    MCV      78 - 100 fl    MCH      26.5 - 33.0 pg    MCHC      31.5 - 36.5 g/dL    RDW      10.0 - 15.0 %    Platelet Count      150 - 450 10e9/L    Specimen Description       Serum   Lyme Screen IgG and IgM       Test value: <0.75....Interpretation: Negative....If you highly suspect Lyme . . .   Vitamin D Deficiency screening      30 - 75 ug/L    Ferritin      10 - 300 ng/mL    Sed Rate      0 - 20 mm/h    ALLI Screen by EIA      <1.0    Rheumatoid Factor      0 - 14 IU/mL    CK Total      30 - 225 U/L    Uric Acid      2.5 - 7.5 mg/dL      Component      Latest Ref Rng 2/28/2013 2/28/2013 2/28/2013 2/28/2013          12:14 PM 12:14 PM 12:14 PM 12:14 PM   WBC      4.0 - 11.0 10e9/L       RBC Count      3.8 - 5.2 10e12/L       Hemoglobin      11.7 - 15.7 g/dL       Hematocrit      35.0 - 47.0 %       MCV      78 - 100 fl       MCH      26.5 - 33.0 pg       MCHC      31.5 - 36.5 g/dL       RDW      10.0 - 15.0 %       Platelet Count      150 - 450 10e9/L       Specimen Description             Lyme Screen IgG and IgM             Vitamin D Deficiency screening      30 - 75 ug/L       Ferritin      10 - 300 ng/mL    10   Sed Rate      0 - 20 mm/h   23 (H)    ALLI Screen by EIA      <1.0  <1.0 . . .     Rheumatoid Factor      0 - 14 IU/mL 26 (H)      CK Total      30 - 225 U/L       Uric Acid      2.5 - 7.5 mg/dL         5/2013  CBC WITH PLATELETS DIFFERENTIAL       Component Value Range    WBC 11.3 (*) 4.0 - 11.0 10e9/L    RBC Count 4.56  3.8 - 5.2 10e12/L    Hemoglobin 11.1 (*) 11.7 - 15.7 g/dL    Hematocrit  34.3 (*) 35.0 - 47.0 %    MCV 75 (*) 78 - 100 fl    MCH 24.3 (*) 26.5 - 33.0 pg    MCHC 32.4  31.5 - 36.5 g/dL    RDW 16.1 (*) 10.0 - 15.0 %    Platelet Count 315  150 - 450 10e9/L    Diff Method Automated Method      % Neutrophils 71.6  40 - 75 %    % Lymphocytes 20.9  20 - 48 %    % Monocytes 4.3  0 - 12 %    % Eosinophils 2.8  0 - 6 %    % Basophils 0.2  0 - 2 %    % Immature Granulocytes 0.2  0 - 0.4 %    Absolute Neutrophil 8.1  1.6 - 8.3 10e9/L    Absolute Lymphocytes 2.4  0.8 - 5.3 10e9/L    Absolute Monoctyes 0.5  0.0 - 1.3 10e9/L    Absolute Eosinophils 0.3  0.0 - 0.7 10e9/L    Absolute Basophils 0.0  0.0 - 0.2 10e9/L    Abs Immature Granulocytes 0.0  0 - 0.03 10e9/L   AMYLASE       Component Value Range    Amylase 103  30 - 110 U/L   COMPREHENSIVE METABOLIC PANEL       Component Value Range    Sodium 144  133 - 144 mmol/L    Potassium 3.8  3.4 - 5.3 mmol/L    Chloride 105  94 - 109 mmol/L    Carbon Dioxide 23  20 - 32 mmol/L    Anion Gap 17  6 - 17 mmol/L    Glucose 173 (*) 60 - 99 mg/dL    Urea Nitrogen 13  5 - 24 mg/dL    Creatinine 0.83  0.52 - 1.04 mg/dL    GFR Estimate 74  >60 mL/min/1.7m2    GFR Estimate If Black 90  >60 mL/min/1.7m2    Calcium 9.7  8.5 - 10.4 mg/dL    Bilirubin Total 0.4  0.2 - 1.3 mg/dL    Albumin 4.3  3.9 - 5.1 g/dL    Protein Total 7.8  6.8 - 8.8 g/dL    Alkaline Phosphatase 66  40 - 150 U/L    ALT 36  0 - 50 U/L    AST 28  0 - 45 U/L   CK TOTAL       Component Value Range    CK Total 66  30 - 225 U/L   CRP INFLAMMATION       Component Value Range    CRP Inflammation 10.4 (*) 0.0 - 8.0 mg/L   LIPASE       Component Value Range    Lipase 353 (*) 20 - 250 U/L   ERYTHROCYTE SEDIMENTATION RATE AUTO       Component Value Range    Sed Rate 26 (*) 0 - 20 mm/h   ROUTINE UA WITH MICROSCOPIC REFLEX TO CULTURE       Component Value Range    Color Urine Yellow      Appearance Urine Slightly Cloudy      Glucose Urine 30 (*) NEG mg/dL    Bilirubin Urine Negative  NEG    Ketones Urine 5 (*) NEG  mg/dL    Specific Gravity Urine 1.026  1.003 - 1.035    Blood Urine Trace (*) NEG    pH Urine 5.0  5.0 - 7.0 pH    Protein Albumin Urine 10 (*) NEG mg/dL    Urobilinogen mg/dL Normal  0.0 - 2.0 mg/dL    Nitrite Urine Negative  NEG    Leukocyte Esterase Urine Negative  NEG    Source Midstream Urine      WBC Urine 1  0 - 2 /HPF    RBC Urine 4 (*) 0 - 2 /HPF    Squamous Epithelial /HPF Urine <1  0 - 1 /HPF    Mucous Urine Present (*) NEG /LPF    Hyaline Casts 3 (*) 0 - 2 /LPF    Calcium Oxalate Moderate (*) NEG /HPF   COMPLEMENT C3       Component Value Range    Complement C3 143  76 - 169 mg/dL   COMPLEMENT C4       Component Value Range    Complement C4 31  15 - 50 mg/dL   HEPATITIS C ANTIBODY       Component Value Range    Hepatitis C Antibody Negative  NEG   CARDIOLIPIN ANTIBODY IGG AND IGM       Component Value Range    Cardiolipin IgG Marline    0 - 15.0 GPL    Value: <15.0      Interpretation:  Negative    Cardiolipin IgM Marline    0 - 12.5 MPL    Value: <12.5      Interpretation:  Negative   LUPUS PANEL       Component Value Range    Lupus Result    NEG    Value: Negative      (Note)      COMMENTS:      The INR is normal.      APTT is normal.  1:2 Mix is not indicated.      DRVVT Screen is normal.      Thrombin time is normal.      NEGATIVE TEST; A LUPUS ANTICOAGULANT WAS NOT DETECTED IN THIS      SPECIMEN WITHIN THE LIMITS OF THE TESTING REPERTOIRE.      If the clinical picture is strongly suggestive of an antiphospholipid      syndrome, recommend anticardiolipin and beta-2-glycoprotein (IgG and      IgM) antibody tests.      Leela Franks M.D.  [phone]      5/2/2013            INR =  0.93 N = 0.86-1.14  (No additional charge)      TT = 15.7 N = 13.0-19.0 sec  (No additional charge)            APTT'S:    Seconds      Reagent =  Stago LA      Normal  =  38      Patient  =  34      1:2 Mix  =  N/A      Reference =  31-43             DILUTE MADELINE VIPER VENOM TEST:      DRVVT Screen Ratio = 0.76 Normal =  <1.21         IMMUNOGLOBULIN G SUBCLASSES       Component Value Range    IGG 1030  695 - 1620 mg/dL    IgG1 488  300 - 856 mg/dL    IgG2 325  158 - 761 mg/dL    IgG3 47  24 - 192 mg/dL    IgG4 18  11 - 86 mg/dL   SUNNY ANTIBODY PANEL       Component Value Range    Ribonucleic Protein IgG Antibody 0      Smith Antibody IgG 1      SSA (RO) Antibody IgG 4      SSB (LA) Antibody IgG 0      Scleroderma Antibody IgG 0     BETA 2 GLYCOPROTEIN ANTIBODIES IGG IGM       Component Value Range    Beta-2-Glycopro IgG 1      Beta-2-Glycopro IgM 5     CYCLIC CIT PEPT IGG       Component Value Range    Cyclic Cit Pept IgG/IgA    <20 UNITS    Value: <20      Interpretation:  Negative   DNA DOUBLE STRANDED ANTIBODIES       Component Value Range    DNA-ds    0 - 29 IU/mL    Value: <15      Interpretation:  Negative       CT CHEST PULMONARY EMBOLISM W CONTRAST 5/20/2015 4:57 PM  HISTORY: Pain. SOB. Elevated d-dimer.   TECHNIQUE: 65 mL Isovue 370. Axial images with coronal  reconstructions.  COMPARISON: None.  FINDINGS: Calcified granuloma with surrounding scarring in the  posterolateral left upper lobe. Triangular shaped opacity at the right  lung base in the lateral right middle lobe could represent some  scarring, atelectasis or infiltrate. There is also some scarring or  atelectasis in the posteromedial right lung base. Lungs otherwise  clear.  The pulmonary arteries are well opacified. No CT evidence for acute  pulmonary embolus. No aortic dissection.  Diffuse fatty infiltration of the liver.  IMPRESSION  IMPRESSION:   1. No pulmonary embolus identified.  2. Small focus of atelectasis, infiltrate or scarring in the lateral  right middle lobe.  3. Old granulomatous disease.  4. Otherwise negative.  SILVERIO VAZQUEZ MD    Copath Report      Patient Name: FAVIO MARTINEZ   MR#: [id number]   Specimen #: [id number]   Collected: 3/15/2016   Received: 3/15/2016   Reported: 3/17/2016 13:33   Ordering Phy(s): PARVEEN ENRIQUEZ     ORIGINAL  "REPORT FOLLOWS ADDENDUM  ADDENDUM     TO ORIGINAL REPORT   Status: Signed Out   Date Ordered:3/18/2016   Date Complete:3/18/2016   Date Reported:3/18/2016 12:06   Signed Out By: Marianne Godfrey MD     INTERPRETATION:   This addendum is done to incorporate the results of fungal (GMS) stains   done on the specimen.  Specimen is negative for fungal organisms.  The   original final diagnosis remains unchanged.     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  Negative for malignancy(Please   see microscopic description)     Electronically signed out by:     Marianne Godfrey MD     CLINICAL HISTORY:   right orbital mass     GROSS:   The specimen is received labeled \"right orbital biopsy\" and consists of   red-tan nodule measuring 1.5 x 0.9 x 0.6 cm with one smooth side and   opposite rough side consistent with periosteum.  The specimen is   bisected revealing homogenous pale tan fleshy cut surface.  Touch   preparations are prepared, air dried and fixed, portion of specimen is   submitted in RPMI for possible lymphoma workup [facility],   ([employer], [place], [place] ).  The remainder is entirely submitted.   (Dictated by: Marianne Godfrey MD 3/15/2016 04:45 PM)     MICROSCOPIC:     Specimen consists of portion of lacrimal gland with acute and chronic   inflammation.  Focal area of microabscess formation associated with   small areas of necrosis are also present.  Inflammation is seen   extending to the periorbital adipose tissue forming panniculitis.   Specimen is negative for malignancy.  Samples sent for immunophenotyping   to [facility], ([employer] [facility], [place], [place] ) reveals no evidence   of monoclonality or aberrant antigen expression.  A GMS (fungal) stain   is pending and results will " be reported as an addendum.     CPT Codes:   A: 87207-BV6, 71614-BQOUS, SOH, 03573-GBJKQ, 70802-IIIT     TESTING LAB LOCATION:   [address]  [id number] [phone]     COLLECTION SITE:   Client: [facility]   Location: [facility] (S)            Component      Latest Ref Rng 4/29/2016   Nucleated RBCs      0 /100 0   Absolute Neutrophil      1.6 - 8.3 10e9/L 8.9 (H)   Absolute Lymphocytes      0.8 - 5.3 10e9/L 3.2   Absolute Monocytes      0.0 - 1.3 10e9/L 0.8   Absolute Eosinophils      0.0 - 0.7 10e9/L 0.2   Absolute Basophils      0.0 - 0.2 10e9/L 0.1   Abs Immature Granulocytes      0 - 0.4 10e9/L 0.1   Absolute Nucleated RBC       0.0   IGG      695 - 1620 mg/dL 836   IgG1      300 - 856 mg/dL 280 (L)   IgG2      158 - 761 mg/dL 277   IgG3      24 - 192 mg/dL 35   IgG4      11 - 86 mg/dL 16   RNP Antibody IgG      0.0 - 0.9 AI <0.2 . . .   Smith SUNNY Antibody IgG      0.0 - 0.9 AI <0.2 . . .   SSA (Ro) (SUNNY) Antibody, IgG      0.0 - 0.9 AI <0.2 . . .   SSB (La) (SUNNY) Antibody, IgG      0.0 - 0.9 AI <0.2 . . .   Scleroderma Antibody Scl-70 SUNNY IgG      0.0 - 0.9 AI <0.2 . . .   Cholesterol      <200 mg/dL 154   Triglycerides      <150 mg/dL 220 (H)   HDL Cholesterol      >49 mg/dL 42 (L)   LDL Cholesterol Calculated      <100 mg/dL 67   Non HDL Cholesterol      <130 mg/dL 111   M Tuberculosis Result      NEG Negative   M Tuberculosis Antigen Value       0.00   Albumin      3.4 - 5.0 g/dL 4.3   ALT      0 - 50 U/L 30   AST      0 - 45 U/L 10   Complement C3      76 - 169 mg/dL 157   Complement C4      15 - 50 mg/dL 32   CRP Inflammation      0.0 - 8.0 mg/L <2.9   Sed Rate      0 - 20 mm/h 2   DNA-ds      <10 IU/mL 1   Cyclic Citrullinated Peptide Antibody, IgG      <7 U/mL 1   Rheumatoid Factor      <20 IU/mL <20   Proteinase 3 Antibody IgG      0.0 - 0.9 AI <0.2 . . .   Myeloperoxidase Antibody IgG      0.0 - 0.9 AI 2.5 (H)   Vitamin D  Deficiency screening      20 - 75 ug/L 24   Hemoglobin A1C      4.3 - 6.0 % 7.9 (H)   ALLI by IFA IgG       1:40 (A) . . .     Component      Latest Ref Rng & Units 10/27/2020   WBC      4.0 - 11.0 10e9/L 9.5   RBC Count      3.8 - 5.2 10e12/L 4.95   Hemoglobin      11.7 - 15.7 g/dL 12.7   Hematocrit      35.0 - 47.0 % 39.3   MCV      78 - 100 fl 79   MCH      26.5 - 33.0 pg 25.7 (L)   MCHC      31.5 - 36.5 g/dL 32.3   RDW      10.0 - 15.0 % 14.2   Platelet Count      150 - 450 10e9/L 394   Diff Method       Automated Method   % Neutrophils      % 77.1   % Lymphocytes      % 13.9   % Monocytes      % 5.1   % Eosinophils      % 3.4   % Basophils      % 0.3   % Immature Granulocytes      % 0.2   Nucleated RBCs      0 /100 0   Absolute Neutrophil      1.6 - 8.3 10e9/L 7.4   Absolute Lymphocytes      0.8 - 5.3 10e9/L 1.3   Absolute Monocytes      0.0 - 1.3 10e9/L 0.5   Absolute Eosinophils      0.0 - 0.7 10e9/L 0.3   Absolute Basophils      0.0 - 0.2 10e9/L 0.0   Abs Immature Granulocytes      0 - 0.4 10e9/L 0.0   Absolute Nucleated RBC       0.0   Color Urine       Yellow   Appearance Urine       Slightly Cloudy   Glucose Urine      NEG:Negative mg/dL Negative   Bilirubin Urine      NEG:Negative Negative   Ketones Urine      NEG:Negative mg/dL Negative   Specific Gravity Urine      1.003 - 1.035 1.023   Blood Urine      NEG:Negative Negative   pH Urine      5.0 - 7.0 pH 5.0   Protein Albumin Urine      NEG:Negative mg/dL Negative   Urobilinogen mg/dL      0.0 - 2.0 mg/dL 0.0   Nitrite Urine      NEG:Negative Negative   Leukocyte Esterase Urine      NEG:Negative Negative   Source       Midstream Urine   WBC Urine      0 - 5 /HPF 1   RBC Urine      0 - 2 /HPF 2   Squamous Epithelial /HPF Urine      0 - 1 /HPF 2 (H)   Mucous Urine      NEG:Negative /LPF Present (A)   Sodium      133 - 144 mmol/L 138   Potassium      3.4 - 5.3 mmol/L 3.7   Chloride      94 - 109 mmol/L 105   Carbon Dioxide      20 - 32 mmol/L 26   Anion  Gap      3 - 14 mmol/L 7   Glucose      70 - 99 mg/dL 148 (H)   Urea Nitrogen      7 - 30 mg/dL 20   Creatinine      0.52 - 1.04 mg/dL 1.13 (H)   GFR Estimate      >60 mL/min/1.73:m2 55 (L)   GFR Estimate If Black      >60 mL/min/1.73:m2 64   Calcium      8.5 - 10.1 mg/dL 9.4   Bilirubin Total      0.2 - 1.3 mg/dL 0.4   Albumin      3.4 - 5.0 g/dL 4.4   Protein Total      6.8 - 8.8 g/dL 7.6   Alkaline Phosphatase      40 - 150 U/L 79   ALT      0 - 50 U/L 42   AST      0 - 45 U/L 35   IGG      610 - 1,616 mg/dL 729   IGA      84 - 499 mg/dL 191   IGM      35 - 242 mg/dL 36   Neutrophil Cytoplasmic Antibody      <1:10 titer <1:10   Neutrophil Cytoplasmic Antibody Pattern       The ANCA IFA is <1:10.  No further testing will be performed.   CD19 B Cells      6 - 27 % <1   Absolute CD19      107 - 698 cells/uL <1   Protein Random Urine      g/L 0.20   Protein Total Urine g/gr Creatinine      0 - 0.2 g/g Cr 0.11   Myeloperoxidase Antibody IgG      0.0 - 0.9 AI 1.3 (H)   Proteinase 3 Antibody IgG      0.0 - 0.9 AI <0.2   Sed Rate      0 - 30 mm/h 10   CRP Inflammation      0.0 - 8.0 mg/L 5.5   Creatinine Urine      mg/dL 186       ROS: A comprehensive ROS was done. Positives are per HPI.          HISTORY REVIEW:  Past Medical History:   Diagnosis Date     Abnormal glandular Papanicolaou smear of cervix 1992     Allergic rhinitis     Allergy, airborne subst     Arthritis      ASCVD (arteriosclerotic cardiovascular disease)      Chronic pain      Chronic pancreatitis (H)     idiopathic, Tx: PPI, antioxidants, pancreatic enzymes     Common migraine      Coronary artery disease      Costochondritis      Difficulty in walking(719.7)      Dyspnea on exertion      Ectasia, mammary duct     [facility], persistent nipple discharge     Elevated fasting glucose      Gastro-oesophageal reflux disease      Hernia      History of angina      Hyperlipidemia LDL goal < 100      Hypertension goal BP (blood pressure) <  140/90     Essential hypertension     Iron deficiency anemia      Ischemic cardiomyopathy      Menorrhagia      Migraine headaches      Mild persistent asthma      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 Matias, neg w/u, Rx'd w/prednisone     Ventral hernia, unspecified, without mention of obstruction or gangrene 2012     Past Surgical History:   Procedure Laterality Date     C ECHO HEART XTHORACIC,COMPLETE, W/O DOPPLER  04    [facility] WNL, EF 60%     C/SECTION, LOW TRANSVERSE           CARDIAC SURGERY      cardiac stent- recent in 16; 4 other stents     DILATION AND CURETTAGE N/A 2016    Procedure: DILATION AND CURETTAGE;  Surgeon: Nahed Butler MD;  Location: [facility] OR     HC UGI ENDOSCOPY W EUS  08    Dr. Pastrana, possible chronic pancreatitis, fatty liver     HERNIA REPAIR  2012    [place] [facility] [place]     INSERT INTRAUTERINE DEVICE N/A 2016    Procedure: INSERT INTRAUTERINE DEVICE;  Surgeon: Nahed Butler MD;  Location: [facility] OR     INT UTERINE FIBRIOD EMBOLIZATION  10/29/2014     LAPAROSCOPIC CHOLECYSTECTOMY  08    Dr. Arnol GRUBBS TX, CERVICAL      s/p LEEP     ORBITOTOMY Right 3/15/2016    Procedure: ORBITOTOMY;  Surgeon: Myron Cyr MD;  Location: [facility]     ORBITOTOMY Right 2017    Procedure:  ORBITOTOMY;  RIGHT ORBITOTOMY AND BIOPSY;  Surgeon: Charis Holbrook MD;  Location:  [facility]     REPAIR PTOSIS Right 2017    Procedure: REPAIR PTOSIS;  RIGHT UPPER LID PTOSIS REPAIR;  Surgeon: Myron Cyr MD;  Location: [facility]     UPPER GI ENDOSCOPY  10/21/08    mild gastritis, Dr. Hidalgo     Family History   Problem Relation Age of Onset     Heart Disease Father 50        heart attack     Cerebrovascular Disease Father      Diabetes Father      Hypertension Father      Depression Father      C.A.D. Father      Hypertension Mother      Arthritis Mother      Heart Disease Mother         long qt syndrome     Thyroid Disease Mother      C.A.D. Mother      Heart Disease Brother 15        MI at 15, 16.      Diabetes Maternal Uncle      Hypertension Maternal Aunt      Hypertension Maternal Uncle      Arthritis Brother         he passed away, had severe arthritis at age 11     Arthritis Maternal Uncle      Eye Disorder Maternal Uncle         cataracts     Neurologic Disorder Sister         migraines     Neurologic Disorder Sister         migraines     Respiratory Son         asthma     Cerebrovascular Disease Maternal Uncle      C.A.D. Brother      Family History Negative Other         neg for RA, SLE     Unknown/Adopted No family hx of         unknown neurological issues in her family, mother was adopted     Skin Cancer No family hx of         No known family hx of skin cancer     Social History     Socioeconomic History     Marital status: Single     Spouse name: Not on file     Number of children: 1     Years of education: Not on file     Highest education level: Not on file   Occupational History     Employer: NONE    Social Needs     Financial resource strain: Not on file     Food insecurity     Worry: Not on file     Inability: Not on file     Transportation needs     Medical: Not on file     Non-medical: Not on file   Tobacco Use     Smoking status: Current Every Day Smoker     Packs/day: 0.20      Years: 1.00     Pack years: 0.20     Types: Cigarettes     Last attempt to quit: 2016     Years since quittin.2     Smokeless tobacco: Never Used   Substance and Sexual Activity     Alcohol use: No     Alcohol/week: 0.0 standard drinks     Drug use: No     Sexual activity: Not Currently   Lifestyle     Physical activity     Days per week: Not on file     Minutes per session: Not on file     Stress: Not on file   Relationships     Social connections     Talks on phone: Not on file     Gets together: Not on file     Attends Jew service: Not on file     Active member of club or organization: Not on file     Attends meetings of clubs or organizations: Not on file     Relationship status: Not on file     Intimate partner violence     Fear of current or ex partner: Not on file     Emotionally abused: Not on file     Physically abused: Not on file     Forced sexual activity: Not on file   Other Topics Concern     Parent/sibling w/ CABG, MI or angioplasty before 65F 55M? Yes   Social History Narrative    Balanced Diet - sometimes    Osteoporosis Prevention Measures - Dairy servings per day: 2 servings weekly    Regular Exercise -  Yes Describe walking 4 times a week    Dental Exam - NO    Seatbelts used - Yes    Self Breast Exam - Yes    Abuse: Current or Past (Physical, Sexual or Emotional)- No    Do you have any concerns about STD's -  No    Do you feel safe in your environment - No    Guns stored in the home - No    Sunscreen used - Yes    Lipids -  YES - Date:     Glucose -  YES - Date:     Eye Exam - YES - Date: one year ago    Colon Cancer Screening - No    Hemoccults - NO    Pap Test -  YES - Date: , remote history of LEEP    Mammography - YES - Date: last spring, would like to discuss, needs a referral to [facility]    DEXA - NO    Immunizations reviewed and up to date - Yes, last td given in  or      Patient Active Problem List   Diagnosis     Seasonal  affective disorder (H)     Allergic rhinitis     PCOS (polycystic ovarian syndrome)     Moderate persistent asthma     Chronic pancreatitis (H)     Hypertension goal BP (blood pressure) < 140/90     Common migraine     NSTEMI (non-ST elevated myocardial infarction) (H)     Hyperlipidemia LDL goal <70     ASCVD (arteriosclerotic cardiovascular disease)     GERD (gastroesophageal reflux disease)     Ischemic cardiomyopathy     Hypertensive heart disease     Uterine leiomyoma     Iron deficiency anemia     Costochondritis     Vitamin D deficiency     Breast cancer screening     Spinal stenosis in cervical region     Fibromyalgia     Seronegative rheumatoid arthritis (H)     Type 2 diabetes, HbA1C goal < 8% (H)     Type 2 diabetes mellitus with other specified complication (H)     Hyperlipidemia associated with type 2 diabetes mellitus (H)     Hypertension associated with diabetes (H)     Overweight with body mass index (BMI) 25.0-29.9     Tobacco use disorder     Telogen effluvium     CAD S/P percutaneous coronary angioplasty     Status post coronary angiogram     ANCA-associated vasculitis (H)     Wegener's vasculitis (H)     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 All misscarriages were in first trimester. H/o OC use in the past. 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    PMHx, FHx, SHx were reviewed, unchanged.      Outpatient Encounter Medications as of 2021   Medication Sig Dispense Refill     acetaminophen (TYLENOL) 325 MG tablet Take 1-2 tablets (325-650 mg) by mouth every 6 hours as needed for pain (headache) 250 tablet 4     albuterol (2.5 MG/3ML) 0.083% neb solution INL 1 VIAL VIA NEBULIZATION Q 4 TO 6 HOURS PRN  1     albuterol (PROAIR HFA, PROVENTIL HFA, VENTOLIN HFA) 108 (90 BASE) MCG/ACT inhaler Inhale 2 puffs into the lungs every 6 hours as needed for shortness of breath / dyspnea or wheezing 3 Inhaler 1     ASPIRIN LOW  DOSE 81 MG EC tablet TAKE 1 TABLET(81 MG) BY MOUTH DAILY 30 tablet 11     blood glucose monitoring (NO BRAND SPECIFIED) meter device kit Use to test blood sugar 4 X times daily or as directed. (Patient taking differently: 1 kit Use to test blood sugar 4 X times daily or as directed.) 1 kit 0     blood glucose monitoring (NO BRAND SPECIFIED) test strip 1 strip by In Vitro route 4 times daily Test as directed. Please dispense three months and three months of refills. Thank you. (Patient taking differently: 1 strip by In Vitro route 4 times daily Test as directed. Please dispense three months and three months of refills. Thank you.) 360 each 3     Blood Pressure Monitor KIT 1 each daily Monitor home blood pressure as instructed by physician.  Dispense [place] blood pressure kit. 1 kit 0     calcium carbonate (TUMS) 500 MG chewable tablet Take 3-4 chew tab by mouth daily as needed.       clopidogrel (PLAVIX) 75 MG tablet Take 1 tablet (75 mg) by mouth daily 30 tablet 11     COMPRESSION STOCKINGS 2 each daily Apply one 10-15 mmHg compression stocking to each lower extgmierty during the day and remove before bedtime. 4 each 2     cyclobenzaprine (FLEXERIL) 10 MG tablet Take 1 tablet (10 mg) by mouth 2 times daily as needed for muscle spasms 60 tablet 2     cycloSPORINE (RESTASIS) 0.05 % ophthalmic emulsion Place 1 drop into both eyes 2 times daily 60 each 11     cycloSPORINE (RESTASIS) 0.05 % ophthalmic emulsion Place 1 drop into the right eye every 12 hours       desonide (DESOWEN) 0.05 % cream Apply topically 2 times daily       diclofenac (VOLTAREN) 1 % topical gel Apply 2 g topically 4 times daily Apply to affected joints 100 g 3     dicyclomine (BENTYL) 20 MG tablet TAKE 1 TABLET(20 MG) BY MOUTH FOUR TIMES DAILY AS NEEDED. Pls schedule clinic appt for refills. 60 tablet 0     diphenhydrAMINE (BENADRYL) 25 MG capsule Take 1 capsule (25 mg) by mouth nightly as needed for itching or allergies 30 capsule 2     EPIPEN 2-RIKY  0.3 MG/0.3ML injection INJECT 0.3 MG INTO THE MUSCLE PRF ANAPHYALAXIS  0     ferrous gluconate (FERGON) 324 (38 Fe) MG tablet Take 1 tablet (324 mg) by mouth 2 times daily (with meals) DO NOT CRUSH. Absorbed best on an empty stomach. If stomach upset occurs, can take with meals. For additional refills, please schedule a follow-up appointment with Dr Solano at [phone] 180 tablet 0     fexofenadine (ALLEGRA) 180 MG tablet Take 1 tablet by mouth daily as needed. 90 tablet 3     fluocinolone (SYNALAR) 0.01 % solution Apply topically daily as needed       fluocinonide (LIDEX) 0.05 % external ointment Apply topically as needed       fluticasone-vilanterol (BREO ELLIPTA) 100-25 MCG/INH inhaler Inhale 1 puff into the lungs daily       folic acid (FOLVITE) 1 MG tablet Take 1 tablet by mouth daily 90 tablet 3     hydroxychloroquine (PLAQUENIL) 200 MG tablet Take 2 tablets (400 mg) by mouth daily (Annual eye exam/plaquenil screening) 180 tablet 0     insulin pen needle (BD MARCK U/F) 32G X 4 MM USE DAILY OR AS DIRECTED 300 each 3     ketoconazole (NIZORAL) 2 % shampoo Apply topically daily as needed       Ketoprofen POWD 1 g 2 times daily as needed (prn pain) 500 g 3     lidocaine (LMX4) 4 % external cream Apply topically once as needed for mild pain (prn pain) 30 g 3     lifitegrast (XIIDRA) 5 % opthalmic solution Place 1 drop into both eyes 2 times daily 20 each 3     lisinopril-hydrochlorothiazide (ZESTORETIC) 20-25 MG tablet Take 1 tablet by mouth daily 30 tablet 6     Magnesium Oxide -Mg Supplement 250 MG TABS TK 1 T PO BID. REDUCE IF STOOLS LOOSEN  11     metFORMIN (GLUCOPHAGE-XR) 500 MG 24 hr tablet TAKE 2 TABLETS(1000 MG) BY MOUTH DAILY WITH DINNER (Patient taking differently: Take 2,000 mg by mouth daily ) 180 tablet 0     metoclopramide (REGLAN) 10 MG tablet Take 1 tablet (10 mg) by mouth 4 times daily (before meals and nightly) 90 tablet 0     metoprolol succinate ER (TOPROL-XL) 100 MG 24 hr tablet Take 1  tablet (100 mg) by mouth daily 30 tablet 11     metroNIDAZOLE (NORITATE) 1 % cream Apply topically daily       montelukast (SINGULAIR) 10 MG tablet Take 1 tablet (10 mg) by mouth At Bedtime 30 tablet 1     Multiple Vitamin (DAILY-GERALD) TABS Take 1 tablet by mouth daily  0     nitroGLYcerin (NITROSTAT) 0.4 MG sublingual tablet Place 1 tablet (0.4 mg) under the tongue every 5 minutes as needed for chest pain . After 3 doses, if pain persists call 911. 25 tablet 3     nystatin (MYCOSTATIN) 915340 UNITS TABS tablet TK 2 TS PO BID  0     ondansetron (ZOFRAN-ODT) 8 MG ODT tab Take 1 tablet (8 mg) by mouth every 8 hours as needed for nausea 60 tablet 1     pantoprazole (PROTONIX) 40 MG EC tablet Take 1 tablet (40 mg) by mouth daily Pork free tablets. (Patient taking differently: Take 40 mg by mouth as needed Pork free tablets.) 30 tablet 1     pravastatin (PRAVACHOL) 40 MG tablet Take 1 tablet (40 mg) by mouth daily 30 tablet 11     ranitidine (ZANTAC) 150 MG tablet Take 1 tablet (150 mg) by mouth 2 times daily 180 tablet 1     sennosides (SENOKOT) 8.6 MG tablet 1-2 tabs a day as needed for constipation 60 tablet 1     spironolactone (ALDACTONE) 25 MG tablet Take 1 tablet (25 mg) by mouth daily TAKE 1 TABLET BY MOUTH EVERY DAY TAKE ADDITIONAL 1/2 TABLET BY MOUTH EVERY DAY AS NEEDED FOR WEIGHT GAIN 30 tablet 11     sucralfate (CARAFATE) 1 GM tablet Take 1 tablet (1 g) by mouth 4 times daily 360 tablet 0     traMADol (ULTRAM) 50 MG tablet Take 1 tablet (50 mg) by mouth every 8 hours as needed for moderate pain 60 tablet 3     Triamcinolone Acetonide (NASACORT ALLERGY 24HR NA)        vitamin D2 (ERGOCALCIFEROL) 08039 units (1250 mcg) capsule Take 1 capsule (50,000 Units) by mouth every 7 days 12 capsule 0     vitamin D3 (CHOLECALCIFEROL) 2000 units (50 mcg) tablet Take 1 tablet (2,000 Units) by mouth daily 90 tablet 1     No facility-administered encounter medications on file as of 4/21/2021.        Allergies   Allergen  Reactions     Amoxicillin-Pot Clavulanate      Augmentin      Unknown possible hives and edema     Azithromycin      Diatrizoate Other (See Comments)     Pt wants this listed because she is allergic to shellfish      Imitrex [Sumatriptan]      Severe face/neck/chest tightness and flushing side effects      Penicillins Hives     Unknown      Pork Allergy      Stomach pain, cramping, diarrhea, itching, nausea and headaches     Shellfish Allergy Hives and Swelling     Sulfa Drugs Hives and Swelling     Zithromax [Azithromycin Dihydrate] Swelling     Unknown          Ph.E:    Not done, phone visit    Assessment/ plan:    1-Seropositive non-erosive RA (arthritis, AM stiffness, high CRP, RF 26 but re-check neg 3/2015 on HCQ) Dx 5/2013, FMS, new pleuritic CP 3/20-15-5/2015 resolved on steroids. She is on  mg bid since 5/2014. She tolerates it well and it's helping some but not enough to control all her pain. Continues having flare ups of joint pain, could be GPA related. Some pain is due to FMS.    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 Prednisone was not a good option for her given weight gain, diabetes. She tried and did not tolerate MTX, AZA.    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 Repeat MPO was positive in 6/2017.    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 Shah and Dr. Valdez at [facility]. 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     She is on rituximab since 12/12/2018 with great response, minor allergic reaction which could be managed by pre-meds and extra steroid dose.      Stable GPA but rituximab does not last 6 months, will give it every 5 months. According to ACR guideline could give every 4-6 months. Stable labs. Last rituximab 1 gram was given on  1/19, 2/2/2021.    Today's  plan:     mg bid with annual eye exam    Labs    Orbit CT for follow up    Eye exam follow up    Plan to do rituximab 500 mg one dose at the end of May 2021      My impression is that her arthralgias are a combination of RA/ IA sec GPA, fibromyalgia and chronic pain.  Stopped HCQ in the past, shortly after resumed taking it as arthralgia recurred.     2-Fad pads. She was seen by endocrinology and cushing was ruled out in 4/2014. Was advised to do f/u for enlarged thyroid/thyroid nodules.    3-Hair loss. F/U with dermatology    4-Chronic pain. F/U with pain clinic    5-Concerns of subclinical hyperthyroidism: TSH is barely minimal, chart review shows that those lowest levels are since April 2014. At previous visits, discussed Endocrinology ref which pt wants to hold off in this visit.     6-Vit D deficiency. Was replaced with vit D 50, 000 units po qwk x 12 wk then 2000 units every day    7-Upper extremity swelling. Recent mammogram without suggestion of malignancy. No LAD of axillary region. Arms appear normal on physical exam, however patient reports intermittent swelling.  - Referral to lymphedema clinic was done in the past.    8-Discussed COVID vaccine:    There is no data about use of COVID-19 vaccines in patients with autoimmune disorders or immunosuppressed as they were excluded from the trials. However from all the available information, we think it should be safe and OK to take as it is not a live vaccine. Concerns include potential risk of autoimmune disease flare up or not being very effective.        Keep 8/20 appointment and make in person 5/10 at 3 pm to examine her in person      Orders Placed This Encounter   Procedures     CT Orbits wo Contrast       Majo Mckinnon MD

## 2021-04-21 NOTE — PATIENT INSTRUCTIONS
Labs    Orbit CT    Eye exam follow up    Plan to do rituximab 500 mg one dose at the end of May    Keep 8/20 appointment and make in person 5/10 at 3 pm

## 2021-04-21 NOTE — LETTER
4/21/2021       RE: Janine Cornell  [address] [facility] [address]     Dear Colleague,    Thank you for referring your patient, Janine Cornell, to the [facility] RHEUMATOLOGY [facility] [place] at [facility]. Please see a copy of my visit note below.    Janine is a 55 year old who is being evaluated via a billable telephone visit.      What phone number would you like to be contacted at? [phone]  How would you like to obtain your AVS? MyChart  Phone call duration: 27 minutes    Rheumatology F/U Virtual Visit Note    Last visit note: 1/15/2021    Today's visit date: 4/21/2021    Reason for visit: F/U GPA      HPI     Ms. 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 She is on HCQ since 5/2014. On rituximab since 12/20218 with great response. Previously tried and did not tolerate MTX, AZA.      She had lateral orbitotomy and debulking orbital mass right eye on 9/26/18 with Dr. Shah and Dr. Valdez at [facility]. 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 There were no complications according to the op note.      Today 4/21/2021: Had last rituximab 1 gram on 1/19, 2/2/2021. Reports rituximab starts to wear off earlier, has more sx this time. Eye swelling is intermittent. Gets indigestion/ GERD sx with constipation after the infusion.    She reports having asthma, swelling of neck, arms. She thinks that it could be from her vasculitis. She is worried about going down on rituximab dose.     Otherwise unchanged sx, no SOB or hemoptysis or persistent cough, or blood in urine.    Somedays her knees and hips hurt a lot, feel like bone on bone in her knees, especially on changing position.      Component      Latest Ref Rng 2/28/2013 2/28/2013          12:14 PM 12:14 PM   WBC      4.0 - 11.0 10e9/L     RBC Count      3.8 - 5.2 10e12/L      Hemoglobin      11.7 - 15.7 g/dL     Hematocrit      35.0 - 47.0 %     MCV      78 - 100 fl     MCH      26.5 - 33.0 pg     MCHC      31.5 - 36.5 g/dL     RDW      10.0 - 15.0 %     Platelet Count      150 - 450 10e9/L     Specimen Description           Lyme Screen IgG and IgM           Vitamin D Deficiency screening      30 - 75 ug/L     Ferritin      10 - 300 ng/mL     Sed Rate      0 - 20 mm/h     ALLI Screen by EIA      <1.0     Rheumatoid Factor      0 - 14 IU/mL     CK Total      30 - 225 U/L  78   Uric Acid      2.5 - 7.5 mg/dL 6.7      Component      Latest Ref Rng 2/28/2013          12:14 PM   WBC      4.0 - 11.0 10e9/L    RBC Count      3.8 - 5.2 10e12/L    Hemoglobin      11.7 - 15.7 g/dL    Hematocrit      35.0 - 47.0 %    MCV      78 - 100 fl    MCH      26.5 - 33.0 pg    MCHC      31.5 - 36.5 g/dL    RDW      10.0 - 15.0 %    Platelet Count      150 - 450 10e9/L    Specimen Description       Serum   Lyme Screen IgG and IgM       Test value: <0.75....Interpretation: Negative....If you highly suspect Lyme . . .   Vitamin D Deficiency screening      30 - 75 ug/L    Ferritin      10 - 300 ng/mL    Sed Rate      0 - 20 mm/h    ALLI Screen by EIA      <1.0    Rheumatoid Factor      0 - 14 IU/mL    CK Total      30 - 225 U/L    Uric Acid      2.5 - 7.5 mg/dL      Component      Latest Ref Rng 2/28/2013 2/28/2013 2/28/2013 2/28/2013          12:14 PM 12:14 PM 12:14 PM 12:14 PM   WBC      4.0 - 11.0 10e9/L       RBC Count      3.8 - 5.2 10e12/L       Hemoglobin      11.7 - 15.7 g/dL       Hematocrit      35.0 - 47.0 %       MCV      78 - 100 fl       MCH      26.5 - 33.0 pg       MCHC      31.5 - 36.5 g/dL       RDW      10.0 - 15.0 %       Platelet Count      150 - 450 10e9/L       Specimen Description             Lyme Screen IgG and IgM             Vitamin D Deficiency screening      30 - 75 ug/L       Ferritin      10 - 300 ng/mL    10   Sed Rate      0 - 20 mm/h   23 (H)    ALLI Screen by EIA      <1.0  <1.0  . . .     Rheumatoid Factor      0 - 14 IU/mL 26 (H)      CK Total      30 - 225 U/L       Uric Acid      2.5 - 7.5 mg/dL         5/2013  CBC WITH PLATELETS DIFFERENTIAL       Component Value Range    WBC 11.3 (*) 4.0 - 11.0 10e9/L    RBC Count 4.56  3.8 - 5.2 10e12/L    Hemoglobin 11.1 (*) 11.7 - 15.7 g/dL    Hematocrit 34.3 (*) 35.0 - 47.0 %    MCV 75 (*) 78 - 100 fl    MCH 24.3 (*) 26.5 - 33.0 pg    MCHC 32.4  31.5 - 36.5 g/dL    RDW 16.1 (*) 10.0 - 15.0 %    Platelet Count 315  150 - 450 10e9/L    Diff Method Automated Method      % Neutrophils 71.6  40 - 75 %    % Lymphocytes 20.9  20 - 48 %    % Monocytes 4.3  0 - 12 %    % Eosinophils 2.8  0 - 6 %    % Basophils 0.2  0 - 2 %    % Immature Granulocytes 0.2  0 - 0.4 %    Absolute Neutrophil 8.1  1.6 - 8.3 10e9/L    Absolute Lymphocytes 2.4  0.8 - 5.3 10e9/L    Absolute Monoctyes 0.5  0.0 - 1.3 10e9/L    Absolute Eosinophils 0.3  0.0 - 0.7 10e9/L    Absolute Basophils 0.0  0.0 - 0.2 10e9/L    Abs Immature Granulocytes 0.0  0 - 0.03 10e9/L   AMYLASE       Component Value Range    Amylase 103  30 - 110 U/L   COMPREHENSIVE METABOLIC PANEL       Component Value Range    Sodium 144  133 - 144 mmol/L    Potassium 3.8  3.4 - 5.3 mmol/L    Chloride 105  94 - 109 mmol/L    Carbon Dioxide 23  20 - 32 mmol/L    Anion Gap 17  6 - 17 mmol/L    Glucose 173 (*) 60 - 99 mg/dL    Urea Nitrogen 13  5 - 24 mg/dL    Creatinine 0.83  0.52 - 1.04 mg/dL    GFR Estimate 74  >60 mL/min/1.7m2    GFR Estimate If Black 90  >60 mL/min/1.7m2    Calcium 9.7  8.5 - 10.4 mg/dL    Bilirubin Total 0.4  0.2 - 1.3 mg/dL    Albumin 4.3  3.9 - 5.1 g/dL    Protein Total 7.8  6.8 - 8.8 g/dL    Alkaline Phosphatase 66  40 - 150 U/L    ALT 36  0 - 50 U/L    AST 28  0 - 45 U/L   CK TOTAL       Component Value Range    CK Total 66  30 - 225 U/L   CRP INFLAMMATION       Component Value Range    CRP Inflammation 10.4 (*) 0.0 - 8.0 mg/L   LIPASE       Component Value Range    Lipase 353 (*) 20 - 250 U/L    ERYTHROCYTE SEDIMENTATION RATE AUTO       Component Value Range    Sed Rate 26 (*) 0 - 20 mm/h   ROUTINE UA WITH MICROSCOPIC REFLEX TO CULTURE       Component Value Range    Color Urine Yellow      Appearance Urine Slightly Cloudy      Glucose Urine 30 (*) NEG mg/dL    Bilirubin Urine Negative  NEG    Ketones Urine 5 (*) NEG mg/dL    Specific Gravity Urine 1.026  1.003 - 1.035    Blood Urine Trace (*) NEG    pH Urine 5.0  5.0 - 7.0 pH    Protein Albumin Urine 10 (*) NEG mg/dL    Urobilinogen mg/dL Normal  0.0 - 2.0 mg/dL    Nitrite Urine Negative  NEG    Leukocyte Esterase Urine Negative  NEG    Source Midstream Urine      WBC Urine 1  0 - 2 /HPF    RBC Urine 4 (*) 0 - 2 /HPF    Squamous Epithelial /HPF Urine <1  0 - 1 /HPF    Mucous Urine Present (*) NEG /LPF    Hyaline Casts 3 (*) 0 - 2 /LPF    Calcium Oxalate Moderate (*) NEG /HPF   COMPLEMENT C3       Component Value Range    Complement C3 143  76 - 169 mg/dL   COMPLEMENT C4       Component Value Range    Complement C4 31  15 - 50 mg/dL   HEPATITIS C ANTIBODY       Component Value Range    Hepatitis C Antibody Negative  NEG   CARDIOLIPIN ANTIBODY IGG AND IGM       Component Value Range    Cardiolipin IgG Marline    0 - 15.0 GPL    Value: <15.0      Interpretation:  Negative    Cardiolipin IgM Marline    0 - 12.5 MPL    Value: <12.5      Interpretation:  Negative   LUPUS PANEL       Component Value Range    Lupus Result    NEG    Value: Negative      (Note)      COMMENTS:      The INR is normal.      APTT is normal.  1:2 Mix is not indicated.      DRVVT Screen is normal.      Thrombin time is normal.      NEGATIVE TEST; A LUPUS ANTICOAGULANT WAS NOT DETECTED IN THIS      SPECIMEN WITHIN THE LIMITS OF THE TESTING REPERTOIRE.      If the clinical picture is strongly suggestive of an antiphospholipid      syndrome, recommend anticardiolipin and beta-2-glycoprotein (IgG and      IgM) antibody tests.      Leela Franks M.D.  [phone]      5/2/2013            INR  =  0.93 N = 0.86-1.14  (No additional charge)      TT = 15.7 N = 13.0-19.0 sec  (No additional charge)            APTT'S:    Seconds      Reagent =  Stago LA      Normal  =  38      Patient  =  34      1:2 Mix  =  N/A      Reference =  31-43             DILUTE MADELINE VIPER VENOM TEST:      DRVVT Screen Ratio = 0.76 Normal = <1.21         IMMUNOGLOBULIN G SUBCLASSES       Component Value Range    IGG 1030  695 - 1620 mg/dL    IgG1 488  300 - 856 mg/dL    IgG2 325  158 - 761 mg/dL    IgG3 47  24 - 192 mg/dL    IgG4 18  11 - 86 mg/dL   SUNNY ANTIBODY PANEL       Component Value Range    Ribonucleic Protein IgG Antibody 0      Smith Antibody IgG 1      SSA (RO) Antibody IgG 4      SSB (LA) Antibody IgG 0      Scleroderma Antibody IgG 0     BETA 2 GLYCOPROTEIN ANTIBODIES IGG IGM       Component Value Range    Beta-2-Glycopro IgG 1      Beta-2-Glycopro IgM 5     CYCLIC CIT PEPT IGG       Component Value Range    Cyclic Cit Pept IgG/IgA    <20 UNITS    Value: <20      Interpretation:  Negative   DNA DOUBLE STRANDED ANTIBODIES       Component Value Range    DNA-ds    0 - 29 IU/mL    Value: <15      Interpretation:  Negative       CT CHEST PULMONARY EMBOLISM W CONTRAST 5/20/2015 4:57 PM  HISTORY: Pain. SOB. Elevated d-dimer.   TECHNIQUE: 65 mL Isovue 370. Axial images with coronal  reconstructions.  COMPARISON: None.  FINDINGS: Calcified granuloma with surrounding scarring in the  posterolateral left upper lobe. Triangular shaped opacity at the right  lung base in the lateral right middle lobe could represent some  scarring, atelectasis or infiltrate. There is also some scarring or  atelectasis in the posteromedial right lung base. Lungs otherwise  clear.  The pulmonary arteries are well opacified. No CT evidence for acute  pulmonary embolus. No aortic dissection.  Diffuse fatty infiltration of the liver.  IMPRESSION  IMPRESSION:   1. No pulmonary embolus identified.  2. Small focus of atelectasis, infiltrate or scarring in  "the lateral  right middle lobe.  3. Old granulomatous disease.  4. Otherwise negative.  SILVERIO VAZQUEZ MD    Copath Report      Patient Name: FAVIO MARTINEZ   MR#: [id number]   Specimen #: [id number]   Collected: 3/15/2016   Received: 3/15/2016   Reported: 3/17/2016 13:33   Ordering Phy(s): PARVEEN ENRIQUEZ     ORIGINAL REPORT FOLLOWS ADDENDUM  ADDENDUM     TO ORIGINAL REPORT   Status: Signed Out   Date Ordered:3/18/2016   Date Complete:3/18/2016   Date Reported:3/18/2016 12:06   Signed Out By: Marianne Godfrey MD     INTERPRETATION:   This addendum is done to incorporate the results of fungal (GMS) stains   done on the specimen.  Specimen is negative for fungal organisms.  The   original final diagnosis remains unchanged.     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  Negative for malignancy(Please   see microscopic description)     Electronically signed out by:     Marianne Godfrey MD     CLINICAL HISTORY:   right orbital mass     GROSS:   The specimen is received labeled \"right orbital biopsy\" and consists of   red-tan nodule measuring 1.5 x 0.9 x 0.6 cm with one smooth side and   opposite rough side consistent with periosteum.  The specimen is   bisected revealing homogenous pale tan fleshy cut surface.  Touch   preparations are prepared, air dried and fixed, portion of specimen is   submitted in RPMI for possible lymphoma workup [facility],   ([facility], [place], [place] ).  The remainder is entirely submitted.   (Dictated by: Marianne Godfrey MD 3/15/2016 04:45 PM)     MICROSCOPIC:     Specimen consists of portion of lacrimal gland with acute and chronic   inflammation.  Focal area of microabscess formation associated with   small areas of necrosis are also present.  Inflammation is " seen   extending to the periorbital adipose tissue forming panniculitis.   Specimen is negative for malignancy.  Samples sent for immunophenotyping   to [facility], ([facility], [place], [place] ) reveals no evidence   of monoclonality or aberrant antigen expression.  A GMS (fungal) stain   is pending and results will be reported as an addendum.     CPT Codes:   A: 06589-OP8, 34118-WVRIE, SOH, 59497-GTTGI, 41334-SERG     TESTING LAB LOCATION:   [address]  [id number] [phone]     COLLECTION SITE:   [facility]   [facility]            Component      Latest Ref Rng 4/29/2016   Nucleated RBCs      0 /100 0   Absolute Neutrophil      1.6 - 8.3 10e9/L 8.9 (H)   Absolute Lymphocytes      0.8 - 5.3 10e9/L 3.2   Absolute Monocytes      0.0 - 1.3 10e9/L 0.8   Absolute Eosinophils      0.0 - 0.7 10e9/L 0.2   Absolute Basophils      0.0 - 0.2 10e9/L 0.1   Abs Immature Granulocytes      0 - 0.4 10e9/L 0.1   Absolute Nucleated RBC       0.0   IGG      695 - 1620 mg/dL 836   IgG1      300 - 856 mg/dL 280 (L)   IgG2      158 - 761 mg/dL 277   IgG3      24 - 192 mg/dL 35   IgG4      11 - 86 mg/dL 16   RNP Antibody IgG      0.0 - 0.9 AI <0.2 . . .   Smith SUNNY Antibody IgG      0.0 - 0.9 AI <0.2 . . .   SSA (Ro) (SUNNY) Antibody, IgG      0.0 - 0.9 AI <0.2 . . .   SSB (La) (SUNNY) Antibody, IgG      0.0 - 0.9 AI <0.2 . . .   Scleroderma Antibody Scl-70 SUNNY IgG      0.0 - 0.9 AI <0.2 . . .   Cholesterol      <200 mg/dL 154   Triglycerides      <150 mg/dL 220 (H)   HDL Cholesterol      >49 mg/dL 42 (L)   LDL Cholesterol Calculated      <100 mg/dL 67   Non HDL Cholesterol      <130 mg/dL 111   M Tuberculosis Result      NEG Negative   M Tuberculosis Antigen Value       0.00   Albumin      3.4 - 5.0 g/dL 4.3   ALT      0 - 50 U/L 30   AST      0 - 45 U/L 10   Complement C3      76 - 169 mg/dL 157   Complement C4      15 - 50 mg/dL 32   CRP  Inflammation      0.0 - 8.0 mg/L <2.9   Sed Rate      0 - 20 mm/h 2   DNA-ds      <10 IU/mL 1   Cyclic Citrullinated Peptide Antibody, IgG      <7 U/mL 1   Rheumatoid Factor      <20 IU/mL <20   Proteinase 3 Antibody IgG      0.0 - 0.9 AI <0.2 . . .   Myeloperoxidase Antibody IgG      0.0 - 0.9 AI 2.5 (H)   Vitamin D Deficiency screening      20 - 75 ug/L 24   Hemoglobin A1C      4.3 - 6.0 % 7.9 (H)   ALLI by IFA IgG       1:40 (A) . . .     Component      Latest Ref Rng & Units 10/27/2020   WBC      4.0 - 11.0 10e9/L 9.5   RBC Count      3.8 - 5.2 10e12/L 4.95   Hemoglobin      11.7 - 15.7 g/dL 12.7   Hematocrit      35.0 - 47.0 % 39.3   MCV      78 - 100 fl 79   MCH      26.5 - 33.0 pg 25.7 (L)   MCHC      31.5 - 36.5 g/dL 32.3   RDW      10.0 - 15.0 % 14.2   Platelet Count      150 - 450 10e9/L 394   Diff Method       Automated Method   % Neutrophils      % 77.1   % Lymphocytes      % 13.9   % Monocytes      % 5.1   % Eosinophils      % 3.4   % Basophils      % 0.3   % Immature Granulocytes      % 0.2   Nucleated RBCs      0 /100 0   Absolute Neutrophil      1.6 - 8.3 10e9/L 7.4   Absolute Lymphocytes      0.8 - 5.3 10e9/L 1.3   Absolute Monocytes      0.0 - 1.3 10e9/L 0.5   Absolute Eosinophils      0.0 - 0.7 10e9/L 0.3   Absolute Basophils      0.0 - 0.2 10e9/L 0.0   Abs Immature Granulocytes      0 - 0.4 10e9/L 0.0   Absolute Nucleated RBC       0.0   Color Urine       Yellow   Appearance Urine       Slightly Cloudy   Glucose Urine      NEG:Negative mg/dL Negative   Bilirubin Urine      NEG:Negative Negative   Ketones Urine      NEG:Negative mg/dL Negative   Specific Gravity Urine      1.003 - 1.035 1.023   Blood Urine      NEG:Negative Negative   pH Urine      5.0 - 7.0 pH 5.0   Protein Albumin Urine      NEG:Negative mg/dL Negative   Urobilinogen mg/dL      0.0 - 2.0 mg/dL 0.0   Nitrite Urine      NEG:Negative Negative   Leukocyte Esterase Urine      NEG:Negative Negative   Source       Midstream Urine   WBC  Urine      0 - 5 /HPF 1   RBC Urine      0 - 2 /HPF 2   Squamous Epithelial /HPF Urine      0 - 1 /HPF 2 (H)   Mucous Urine      NEG:Negative /LPF Present (A)   Sodium      133 - 144 mmol/L 138   Potassium      3.4 - 5.3 mmol/L 3.7   Chloride      94 - 109 mmol/L 105   Carbon Dioxide      20 - 32 mmol/L 26   Anion Gap      3 - 14 mmol/L 7   Glucose      70 - 99 mg/dL 148 (H)   Urea Nitrogen      7 - 30 mg/dL 20   Creatinine      0.52 - 1.04 mg/dL 1.13 (H)   GFR Estimate      >60 mL/min/1.73:m2 55 (L)   GFR Estimate If Black      >60 mL/min/1.73:m2 64   Calcium      8.5 - 10.1 mg/dL 9.4   Bilirubin Total      0.2 - 1.3 mg/dL 0.4   Albumin      3.4 - 5.0 g/dL 4.4   Protein Total      6.8 - 8.8 g/dL 7.6   Alkaline Phosphatase      40 - 150 U/L 79   ALT      0 - 50 U/L 42   AST      0 - 45 U/L 35   IGG      610 - 1,616 mg/dL 729   IGA      84 - 499 mg/dL 191   IGM      35 - 242 mg/dL 36   Neutrophil Cytoplasmic Antibody      <1:10 titer <1:10   Neutrophil Cytoplasmic Antibody Pattern       The ANCA IFA is <1:10.  No further testing will be performed.   CD19 B Cells      6 - 27 % <1   Absolute CD19      107 - 698 cells/uL <1   Protein Random Urine      g/L 0.20   Protein Total Urine g/gr Creatinine      0 - 0.2 g/g Cr 0.11   Myeloperoxidase Antibody IgG      0.0 - 0.9 AI 1.3 (H)   Proteinase 3 Antibody IgG      0.0 - 0.9 AI <0.2   Sed Rate      0 - 30 mm/h 10   CRP Inflammation      0.0 - 8.0 mg/L 5.5   Creatinine Urine      mg/dL 186       ROS: A comprehensive ROS was done. Positives are per HPI.          HISTORY REVIEW:  Past Medical History:   Diagnosis Date     Abnormal glandular Papanicolaou smear of cervix 1992     Allergic rhinitis     Allergy, airborne subst     Arthritis      ASCVD (arteriosclerotic cardiovascular disease)      Chronic pain      Chronic pancreatitis (H)     idiopathic, Tx: PPI, antioxidants, pancreatic enzymes     Common migraine      Coronary artery disease      Costochondritis      Difficulty  in walking(719.7)      Dyspnea on exertion      Ectasia, mammary duct     [facility], persistent nipple discharge     Elevated fasting glucose      Gastro-oesophageal reflux disease      Hernia      History of angina      Hyperlipidemia LDL goal < 100      Hypertension goal BP (blood pressure) < 140/90     Essential hypertension     Iron deficiency anemia      Ischemic cardiomyopathy      Menorrhagia      Migraine headaches      Mild persistent asthma      Neuritis optic     subacute autoimmune retrobulbar neuritis, 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 Matias, neg w/u, Rx'd w/prednisone     Ventral hernia, unspecified, without mention of obstruction or gangrene 2012     Past Surgical History:   Procedure Laterality Date     C ECHO HEART XTHORACIC,COMPLETE, W/O DOPPLER  04    [facility] WNL, EF 60%     C/SECTION, LOW TRANSVERSE           CARDIAC SURGERY      cardiac stent- recent in 16; 4 other stents     DILATION AND CURETTAGE N/A 2016    Procedure: DILATION AND CURETTAGE;  Surgeon: Nahed Butler MD;  Location:  OR      UGI ENDOSCOPY W EUS  08    Dr. Pastrana, possible chronic pancreatitis, fatty liver     HERNIA REPAIR  2012    done at [facility]     INSERT INTRAUTERINE DEVICE N/A 2016    Procedure: INSERT INTRAUTERINE DEVICE;  Surgeon: Nahed Butler MD;   Location: [facility]     INT UTERINE FIBRIOD EMBOLIZATION  10/29/2014     LAPAROSCOPIC CHOLECYSTECTOMY  08    Dr. Arnol GRUBBS TX, CERVICAL      s/p LEEP     ORBITOTOMY Right 3/15/2016    Procedure: ORBITOTOMY;  Surgeon: Myron Cyr MD;  Location: [facility]     ORBITOTOMY Right 2017    Procedure: ORBITOTOMY;  RIGHT ORBITOTOMY AND BIOPSY;  Surgeon: Charis Holbrook MD;  Location:  [facility]     REPAIR PTOSIS Right 2017    Procedure: REPAIR PTOSIS;  RIGHT UPPER LID PTOSIS REPAIR;  Surgeon: Myron Cyr MD;  Location: [facility]     UPPER GI ENDOSCOPY  10/21/08    mild gastritis, Dr. Hidalgo     Family History   Problem Relation Age of Onset     Heart Disease Father 50        heart attack     Cerebrovascular Disease Father      Diabetes Father      Hypertension Father      Depression Father      C.A.D. Father      Hypertension Mother      Arthritis Mother      Heart Disease Mother         long qt syndrome     Thyroid Disease Mother      C.A.D. Mother      Heart Disease Brother 15        MI at 15, 16.      Diabetes Maternal Uncle      Hypertension Maternal Aunt      Hypertension Maternal Uncle      Arthritis Brother         he passed away, had severe arthritis at age 11     Arthritis Maternal Uncle      Eye Disorder Maternal Uncle         cataracts     Neurologic Disorder Sister         migraines     Neurologic Disorder Sister         migraines     Respiratory Son         asthma     Cerebrovascular Disease Maternal Uncle      C.A.D. Brother      Family History Negative Other         neg for RA, SLE     Unknown/Adopted No family hx of         unknown neurological issues in her family, mother was adopted     Skin Cancer No family hx of         No known family hx of skin cancer     Social History     Socioeconomic History     Marital status: Single     Spouse name: Not on file     Number of children: 1     Years of education: Not on file     Highest education level: Not on file   Occupational  History     Employer: NONE    Social Needs     Financial resource strain: Not on file     Food insecurity     Worry: Not on file     Inability: Not on file     Transportation needs     Medical: Not on file     Non-medical: Not on file   Tobacco Use     Smoking status: Current Every Day Smoker     Packs/day: 0.20     Years: 1.00     Pack years: 0.20     Types: Cigarettes     Last attempt to quit: 2016     Years since quittin.2     Smokeless tobacco: Never Used   Substance and Sexual Activity     Alcohol use: No     Alcohol/week: 0.0 standard drinks     Drug use: No     Sexual activity: Not Currently   Lifestyle     Physical activity     Days per week: Not on file     Minutes per session: Not on file     Stress: Not on file   Relationships     Social connections     Talks on phone: Not on file     Gets together: Not on file     Attends Hindu service: Not on file     Active member of club or organization: Not on file     Attends meetings of clubs or organizations: Not on file     Relationship status: Not on file     Intimate partner violence     Fear of current or ex partner: Not on file     Emotionally abused: Not on file     Physically abused: Not on file     Forced sexual activity: Not on file   Other Topics Concern     Parent/sibling w/ CABG, MI or angioplasty before 65F 55M? Yes   Social History Narrative    Balanced Diet - sometimes    Osteoporosis Prevention Measures - Dairy servings per day: 2 servings weekly    Regular Exercise -  Yes Describe walking 4 times a week    Dental Exam - NO    Seatbelts used - Yes    Self Breast Exam - Yes    Abuse: Current or Past (Physical, Sexual or Emotional)- No    Do you have any concerns about STD's -  No    Do you feel safe in your environment - No    Guns stored in the home - No    Sunscreen used - Yes    Lipids -  YES - Date:     Glucose -  YES - Date:     Eye Exam - YES - Date: one year ago    Colon Cancer Screening - No    Hemoccults - NO    Pap  Test -  YES - Date: , remote history of LEEP    Mammography - YES - Date: last spring, would like to discuss, needs [facility]    DEXA - NO    Immunizations reviewed and up to date - Yes, last td given in  or      Patient Active Problem List   Diagnosis     Seasonal affective disorder (H)     Allergic rhinitis     PCOS (polycystic ovarian syndrome)     Moderate persistent asthma     Chronic pancreatitis (H)     Hypertension goal BP (blood pressure) < 140/90     Common migraine     NSTEMI (non-ST elevated myocardial infarction) (H)     Hyperlipidemia LDL goal <70     ASCVD (arteriosclerotic cardiovascular disease)     GERD (gastroesophageal reflux disease)     Ischemic cardiomyopathy     Hypertensive heart disease     Uterine leiomyoma     Iron deficiency anemia     Costochondritis     Vitamin D deficiency     Breast cancer screening     Spinal stenosis in cervical region     Fibromyalgia     Seronegative rheumatoid arthritis (H)     Type 2 diabetes, HbA1C goal < 8% (H)     Type 2 diabetes mellitus with other specified complication (H)     Hyperlipidemia associated with type 2 diabetes mellitus (H)     Hypertension associated with diabetes (H)     Overweight with body mass index (BMI) 25.0-29.9     Tobacco use disorder     Telogen effluvium     CAD S/P percutaneous coronary angioplasty     Status post coronary angiogram     ANCA-associated vasculitis (H)     Wegener's vasculitis (H)     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 All misscarriages were in first trimester. H/o OC use in the past. 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    PMHx, FHx, SHx were reviewed, unchanged.      Outpatient Encounter Medications as of 2021   Medication Sig Dispense Refill     acetaminophen (TYLENOL) 325 MG tablet Take 1-2 tablets (325-650 mg) by mouth every 6 hours as needed for pain (headache) 250 tablet 4      albuterol (2.5 MG/3ML) 0.083% neb solution INL 1 VIAL VIA NEBULIZATION Q 4 TO 6 HOURS PRN  1     albuterol (PROAIR HFA, PROVENTIL HFA, VENTOLIN HFA) 108 (90 BASE) MCG/ACT inhaler Inhale 2 puffs into the lungs every 6 hours as needed for shortness of breath / dyspnea or wheezing 3 Inhaler 1     ASPIRIN LOW DOSE 81 MG EC tablet TAKE 1 TABLET(81 MG) BY MOUTH DAILY 30 tablet 11     blood glucose monitoring (NO BRAND SPECIFIED) meter device kit Use to test blood sugar 4 X times daily or as directed. (Patient taking differently: 1 kit Use to test blood sugar 4 X times daily or as directed.) 1 kit 0     blood glucose monitoring (NO BRAND SPECIFIED) test strip 1 strip by In Vitro route 4 times daily Test as directed. Please dispense three months and three months of refills. Thank you. (Patient taking differently: 1 strip by In Vitro route 4 times daily Test as directed. Please dispense three months and three months of refills. Thank you.) 360 each 3     Blood Pressure Monitor KIT 1 each daily Monitor home blood pressure as instructed by physician.  Dispense [facility] blood pressure kit. 1 kit 0     calcium carbonate (TUMS) 500 MG chewable tablet Take 3-4 chew tab by mouth daily as needed.       clopidogrel (PLAVIX) 75 MG tablet Take 1 tablet (75 mg) by mouth daily 30 tablet 11     COMPRESSION STOCKINGS 2 each daily Apply one 10-15 mmHg compression stocking to each lower extgmierty during the day and remove before bedtime. 4 each 2     cyclobenzaprine (FLEXERIL) 10 MG tablet Take 1 tablet (10 mg) by mouth 2 times daily as needed for muscle spasms 60 tablet 2     cycloSPORINE (RESTASIS) 0.05 % ophthalmic emulsion Place 1 drop into both eyes 2 times daily 60 each 11     cycloSPORINE (RESTASIS) 0.05 % ophthalmic emulsion Place 1 drop into the right eye every 12 hours       desonide (DESOWEN) 0.05 % cream Apply topically 2 times daily       diclofenac (VOLTAREN) 1 % topical gel Apply 2 g topically 4 times daily Apply to affected  joints 100 g 3     dicyclomine (BENTYL) 20 MG tablet TAKE 1 TABLET(20 MG) BY MOUTH FOUR TIMES DAILY AS NEEDED. Pls schedule clinic appt for refills. 60 tablet 0     diphenhydrAMINE (BENADRYL) 25 MG capsule Take 1 capsule (25 mg) by mouth nightly as needed for itching or allergies 30 capsule 2     EPIPEN 2-RIKY 0.3 MG/0.3ML injection INJECT 0.3 MG INTO THE MUSCLE PRF ANAPHYALAXIS  0     ferrous gluconate (FERGON) 324 (38 Fe) MG tablet Take 1 tablet (324 mg) by mouth 2 times daily (with meals) DO NOT CRUSH. Absorbed best on an empty stomach. If stomach upset occurs, can take with meals. For additional refills, please schedule a follow-up appointment with Dr Solano at [phone] 180 tablet 0     fexofenadine (ALLEGRA) 180 MG tablet Take 1 tablet by mouth daily as needed. 90 tablet 3     fluocinolone (SYNALAR) 0.01 % solution Apply topically daily as needed       fluocinonide (LIDEX) 0.05 % external ointment Apply topically as needed       fluticasone-vilanterol (BREO ELLIPTA) 100-25 MCG/INH inhaler Inhale 1 puff into the lungs daily       folic acid (FOLVITE) 1 MG tablet Take 1 tablet by mouth daily 90 tablet 3     hydroxychloroquine (PLAQUENIL) 200 MG tablet Take 2 tablets (400 mg) by mouth daily (Annual eye exam/plaquenil screening) 180 tablet 0     insulin pen needle (BD MARCK U/F) 32G X 4 MM USE DAILY OR AS DIRECTED 300 each 3     ketoconazole (NIZORAL) 2 % shampoo Apply topically daily as needed       Ketoprofen POWD 1 g 2 times daily as needed (prn pain) 500 g 3     lidocaine (LMX4) 4 % external cream Apply topically once as needed for mild pain (prn pain) 30 g 3     lifitegrast (XIIDRA) 5 % opthalmic solution Place 1 drop into both eyes 2 times daily 20 each 3     lisinopril-hydrochlorothiazide (ZESTORETIC) 20-25 MG tablet Take 1 tablet by mouth daily 30 tablet 6     Magnesium Oxide -Mg Supplement 250 MG TABS TK 1 T PO BID. REDUCE IF STOOLS LOOSEN  11     metFORMIN (GLUCOPHAGE-XR) 500 MG 24 hr tablet TAKE 2  TABLETS(1000 MG) BY MOUTH DAILY WITH DINNER (Patient taking differently: Take 2,000 mg by mouth daily ) 180 tablet 0     metoclopramide (REGLAN) 10 MG tablet Take 1 tablet (10 mg) by mouth 4 times daily (before meals and nightly) 90 tablet 0     metoprolol succinate ER (TOPROL-XL) 100 MG 24 hr tablet Take 1 tablet (100 mg) by mouth daily 30 tablet 11     metroNIDAZOLE (NORITATE) 1 % cream Apply topically daily       montelukast (SINGULAIR) 10 MG tablet Take 1 tablet (10 mg) by mouth At Bedtime 30 tablet 1     Multiple Vitamin (DAILY-GERALD) TABS Take 1 tablet by mouth daily  0     nitroGLYcerin (NITROSTAT) 0.4 MG sublingual tablet Place 1 tablet (0.4 mg) under the tongue every 5 minutes as needed for chest pain . After 3 doses, if pain persists call 911. 25 tablet 3     nystatin (MYCOSTATIN) 465497 UNITS TABS tablet TK 2 TS PO BID  0     ondansetron (ZOFRAN-ODT) 8 MG ODT tab Take 1 tablet (8 mg) by mouth every 8 hours as needed for nausea 60 tablet 1     pantoprazole (PROTONIX) 40 MG EC tablet Take 1 tablet (40 mg) by mouth daily Pork free tablets. (Patient taking differently: Take 40 mg by mouth as needed Pork free tablets.) 30 tablet 1     pravastatin (PRAVACHOL) 40 MG tablet Take 1 tablet (40 mg) by mouth daily 30 tablet 11     ranitidine (ZANTAC) 150 MG tablet Take 1 tablet (150 mg) by mouth 2 times daily 180 tablet 1     sennosides (SENOKOT) 8.6 MG tablet 1-2 tabs a day as needed for constipation 60 tablet 1     spironolactone (ALDACTONE) 25 MG tablet Take 1 tablet (25 mg) by mouth daily TAKE 1 TABLET BY MOUTH EVERY DAY TAKE ADDITIONAL 1/2 TABLET BY MOUTH EVERY DAY AS NEEDED FOR WEIGHT GAIN 30 tablet 11     sucralfate (CARAFATE) 1 GM tablet Take 1 tablet (1 g) by mouth 4 times daily 360 tablet 0     traMADol (ULTRAM) 50 MG tablet Take 1 tablet (50 mg) by mouth every 8 hours as needed for moderate pain 60 tablet 3     Triamcinolone Acetonide (NASACORT ALLERGY 24HR NA)        vitamin D2 (ERGOCALCIFEROL) 83890 units  (1250 mcg) capsule Take 1 capsule (50,000 Units) by mouth every 7 days 12 capsule 0     vitamin D3 (CHOLECALCIFEROL) 2000 units (50 mcg) tablet Take 1 tablet (2,000 Units) by mouth daily 90 tablet 1     No facility-administered encounter medications on file as of 4/21/2021.        Allergies   Allergen Reactions     Amoxicillin-Pot Clavulanate      Augmentin      Unknown possible hives and edema     Azithromycin      Diatrizoate Other (See Comments)     Pt wants this listed because she is allergic to shellfish      Imitrex [Sumatriptan]      Severe face/neck/chest tightness and flushing side effects      Penicillins Hives     Unknown      Pork Allergy      Stomach pain, cramping, diarrhea, itching, nausea and headaches     Shellfish Allergy Hives and Swelling     Sulfa Drugs Hives and Swelling     Zithromax [Azithromycin Dihydrate] Swelling     Unknown          Ph.E:    Not done, phone visit    Assessment/ plan:    1-Seropositive non-erosive RA (arthritis, AM stiffness, high CRP, RF 26 but re-check neg 3/2015 on HCQ) Dx 5/2013, FMS, new pleuritic CP 3/20-15-5/2015 resolved on steroids. She is on  mg bid since 5/2014. She tolerates it well and it's helping some but not enough to control all her pain. Continues having flare ups of joint pain, could be GPA related. Some pain is due to FMS.    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 Prednisone was not a good option for her given weight gain, diabetes. She tried and did not tolerate MTX, AZA.    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 Repeat MPO was positive in 6/2017.    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 Shah and Dr. Valdez at [place]. 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     She is on rituximab since 12/12/2018 with great response,  minor allergic reaction which could be managed by pre-meds and extra steroid dose.      Stable GPA but rituximab does not last 6 months, will give it every 5 months. According to ACR guideline could give every 4-6 months. Stable labs. Last rituximab 1 gram was given on  1/19, 2/2/2021.    Today's plan:     mg bid with annual eye exam    Labs    Orbit CT for follow up    Eye exam follow up    Plan to do rituximab 500 mg one dose at the end of May 2021      My impression is that her arthralgias are a combination of RA/ IA sec GPA, fibromyalgia and chronic pain.  Stopped HCQ in the past, shortly after resumed taking it as arthralgia recurred.     2-Fad pads. She was seen by endocrinology and cushing was ruled out in 4/2014. Was advised to do f/u for enlarged thyroid/thyroid nodules.    3-Hair loss. F/U with dermatology    4-Chronic pain. F/U with pain clinic    5-Concerns of subclinical hyperthyroidism: TSH is barely minimal, chart review shows that those lowest levels are since April 2014. At previous visits, discussed Endocrinology ref which pt wants to hold off in this visit.     6-Vit D deficiency. Was replaced with vit D 50, 000 units po qwk x 12 wk then 2000 units every day    7-Upper extremity swelling. Recent mammogram without suggestion of malignancy. No LAD of axillary region. Arms appear normal on physical exam, however patient reports intermittent swelling.  - Referral to lymphedema clinic was done in the past.    8-Discussed COVID vaccine:    There is no data about use of COVID-19 vaccines in patients with autoimmune disorders or immunosuppressed as they were excluded from the trials. However from all the available information, we think it should be safe and OK to take as it is not a live vaccine. Concerns include potential risk of autoimmune disease flare up or not being very effective.        Keep 8/20 appointment and make in person 5/10 at 3 pm to examine her in person      Orders Placed This  Encounter   Procedures     CT Orbits wo Contrast       Parastoo Pratibha CARRINGTON

## 2021-04-22 RX ORDER — CYCLOBENZAPRINE HCL 10 MG
10 TABLET ORAL 2 TIMES DAILY PRN
Qty: 60 TABLET | Refills: 3 | Status: SHIPPED | OUTPATIENT
Start: 2021-04-22 | End: 2021-11-05

## 2021-04-22 RX ORDER — TRAMADOL HYDROCHLORIDE 50 MG/1
50 TABLET ORAL EVERY 8 HOURS PRN
Qty: 60 TABLET | Refills: 3 | Status: SHIPPED | OUTPATIENT
Start: 2021-04-22 | End: 2021-11-01

## 2021-04-22 RX ORDER — HYDROXYCHLOROQUINE SULFATE 200 MG/1
400 TABLET, FILM COATED ORAL DAILY
Qty: 180 TABLET | Refills: 0 | Status: SHIPPED | OUTPATIENT
Start: 2021-04-22 | End: 2021-06-17

## 2021-05-07 DIAGNOSIS — Z51.81 ENCOUNTER FOR MEDICATION MONITORING: ICD-10-CM

## 2021-05-07 DIAGNOSIS — E55.9 VITAMIN D DEFICIENCY: ICD-10-CM

## 2021-05-07 DIAGNOSIS — M31.30 GRANULOMATOSIS WITH POLYANGIITIS, UNSPECIFIED WHETHER RENAL INVOLVEMENT (H): ICD-10-CM

## 2021-05-07 LAB
ALBUMIN SERPL-MCNC: 4.1 G/DL (ref 3.4–5)
ALT SERPL W P-5'-P-CCNC: 28 U/L (ref 0–50)
AST SERPL W P-5'-P-CCNC: 18 U/L (ref 0–45)
BASOPHILS # BLD AUTO: 0.1 10E9/L (ref 0–0.2)
BASOPHILS NFR BLD AUTO: 0.7 %
CREAT SERPL-MCNC: 0.96 MG/DL (ref 0.52–1.04)
CRP SERPL-MCNC: <2.9 MG/L (ref 0–8)
DEPRECATED CALCIDIOL+CALCIFEROL SERPL-MC: 41 UG/L (ref 20–75)
DIFFERENTIAL METHOD BLD: ABNORMAL
EOSINOPHIL # BLD AUTO: 0.5 10E9/L (ref 0–0.7)
EOSINOPHIL NFR BLD AUTO: 5.3 %
ERYTHROCYTE [DISTWIDTH] IN BLOOD BY AUTOMATED COUNT: 13.7 % (ref 10–15)
ERYTHROCYTE [SEDIMENTATION RATE] IN BLOOD BY WESTERGREN METHOD: 9 MM/H (ref 0–30)
GFR SERPL CREATININE-BSD FRML MDRD: 66 ML/MIN/{1.73_M2}
HCT VFR BLD AUTO: 39.7 % (ref 35–47)
HGB BLD-MCNC: 12.7 G/DL (ref 11.7–15.7)
IMM GRANULOCYTES # BLD: 0 10E9/L (ref 0–0.4)
IMM GRANULOCYTES NFR BLD: 0.2 %
LYMPHOCYTES # BLD AUTO: 1.8 10E9/L (ref 0.8–5.3)
LYMPHOCYTES NFR BLD AUTO: 20.7 %
MCH RBC QN AUTO: 26 PG (ref 26.5–33)
MCHC RBC AUTO-ENTMCNC: 32 G/DL (ref 31.5–36.5)
MCV RBC AUTO: 81 FL (ref 78–100)
MONOCYTES # BLD AUTO: 0.7 10E9/L (ref 0–1.3)
MONOCYTES NFR BLD AUTO: 7.8 %
NEUTROPHILS # BLD AUTO: 5.8 10E9/L (ref 1.6–8.3)
NEUTROPHILS NFR BLD AUTO: 65.3 %
NRBC # BLD AUTO: 0 10*3/UL
NRBC BLD AUTO-RTO: 0 /100
PLATELET # BLD AUTO: 336 10E9/L (ref 150–450)
RBC # BLD AUTO: 4.89 10E12/L (ref 3.8–5.2)
WBC # BLD AUTO: 8.9 10E9/L (ref 4–11)

## 2021-05-07 PROCEDURE — 82784 ASSAY IGA/IGD/IGG/IGM EACH: CPT | Performed by: INTERNAL MEDICINE

## 2021-05-07 PROCEDURE — 82306 VITAMIN D 25 HYDROXY: CPT | Performed by: INTERNAL MEDICINE

## 2021-05-07 PROCEDURE — 83876 ASSAY MYELOPEROXIDASE: CPT | Performed by: INTERNAL MEDICINE

## 2021-05-07 PROCEDURE — 86255 FLUORESCENT ANTIBODY SCREEN: CPT | Performed by: INTERNAL MEDICINE

## 2021-05-07 PROCEDURE — 85652 RBC SED RATE AUTOMATED: CPT | Performed by: INTERNAL MEDICINE

## 2021-05-07 PROCEDURE — 83516 IMMUNOASSAY NONANTIBODY: CPT | Performed by: INTERNAL MEDICINE

## 2021-05-07 PROCEDURE — 85025 COMPLETE CBC W/AUTO DIFF WBC: CPT | Performed by: INTERNAL MEDICINE

## 2021-05-07 PROCEDURE — 86355 B CELLS TOTAL COUNT: CPT | Performed by: INTERNAL MEDICINE

## 2021-05-07 PROCEDURE — 84460 ALANINE AMINO (ALT) (SGPT): CPT | Performed by: INTERNAL MEDICINE

## 2021-05-07 PROCEDURE — 82565 ASSAY OF CREATININE: CPT | Performed by: INTERNAL MEDICINE

## 2021-05-07 PROCEDURE — 82040 ASSAY OF SERUM ALBUMIN: CPT | Performed by: INTERNAL MEDICINE

## 2021-05-07 PROCEDURE — 36415 COLL VENOUS BLD VENIPUNCTURE: CPT | Performed by: INTERNAL MEDICINE

## 2021-05-07 PROCEDURE — 84450 TRANSFERASE (AST) (SGOT): CPT | Performed by: INTERNAL MEDICINE

## 2021-05-07 PROCEDURE — 86140 C-REACTIVE PROTEIN: CPT | Performed by: INTERNAL MEDICINE

## 2021-05-07 NOTE — LETTER
May 19, 2021      Janine Cornell  [address] [facility] [address]        Dear ,    We are writing to inform you of your test results.    Improved MPO vasculitis marker and (GFR) kidney function.    Resulted Orders   Vitamin D Deficiency   Result Value Ref Range    Vitamin D Deficiency screening 41 20 - 75 ug/L      Comment:      Season, race, dietary intake, and treatment affect the concentration of   25-hydroxy-Vitamin D. Values may decrease during winter months and increase   during summer months. Values 20-29 ug/L may indicate Vitamin D insufficiency   and values <20 ug/L may indicate Vitamin D deficiency.  Vitamin D determination is routinely performed by an immunoassay specific for   25 hydroxyvitamin D3.  If an individual is on vitamin D2 (ergocalciferol)   supplementation, please specify 25 OH vitamin D2 and D3 level determination by   LCMSMS test [id number].     Immunoglobulins A G and M   Result Value Ref Range     610 - 1,616 mg/dL     84 - 499 mg/dL    IGM 36 35 - 242 mg/dL   ANCA IgG by IFA with Reflex to Titer   Result Value Ref Range    Neutrophil Cytoplasmic Antibody <1:10 <1:10 [titer]    Neutrophil Cytoplasmic Antibody Pattern       The ANCA IFA is <1:10.  No further testing will be performed.   CD19 B Cell Count   Result Value Ref Range    CD19 B Cells <1 (L) 6 - 27 %    Absolute CD19 <1 (L) 107 - 698 cells/uL   Erythrocyte sedimentation rate auto   Result Value Ref Range    Sed Rate 9 0 - 30 mm/h   CRP inflammation   Result Value Ref Range    CRP Inflammation <2.9 0.0 - 8.0 mg/L   Creatinine   Result Value Ref Range    Creatinine 0.96 0.52 - 1.04 mg/dL    GFR Estimate 66 >60 mL/min/[1.73_m2]      Comment:      Non  GFR Calc  Starting 12/18/2018, serum creatinine based estimated GFR (eGFR) will be   calculated using the Chronic Kidney Disease Epidemiology Collaboration   (CKD-EPI) equation.      GFR Estimate If Black 77 >60 mL/min/[1.73_m2]      Comment:        GFR Calc  Starting 12/18/2018, serum creatinine based estimated GFR (eGFR) will be   calculated using the Chronic Kidney Disease Epidemiology Collaboration   (CKD-EPI) equation.     CBC with platelets differential   Result Value Ref Range    WBC 8.9 4.0 - 11.0 10e9/L    RBC Count 4.89 3.8 - 5.2 10e12/L    Hemoglobin 12.7 11.7 - 15.7 g/dL    Hematocrit 39.7 35.0 - 47.0 %    MCV 81 78 - 100 fl    MCH 26.0 (L) 26.5 - 33.0 pg    MCHC 32.0 31.5 - 36.5 g/dL    RDW 13.7 10.0 - 15.0 %    Platelet Count 336 150 - 450 10e9/L    Diff Method Automated Method     % Neutrophils 65.3 %    % Lymphocytes 20.7 %    % Monocytes 7.8 %    % Eosinophils 5.3 %    % Basophils 0.7 %    % Immature Granulocytes 0.2 %    Nucleated RBCs 0 0 /100    Absolute Neutrophil 5.8 1.6 - 8.3 10e9/L    Absolute Lymphocytes 1.8 0.8 - 5.3 10e9/L    Absolute Monocytes 0.7 0.0 - 1.3 10e9/L    Absolute Eosinophils 0.5 0.0 - 0.7 10e9/L    Absolute Basophils 0.1 0.0 - 0.2 10e9/L    Abs Immature Granulocytes 0.0 0 - 0.4 10e9/L    Absolute Nucleated RBC 0.0    Albumin level   Result Value Ref Range    Albumin 4.1 3.4 - 5.0 g/dL   Myeloperoxidase and Proteinase 3 Panel   Result Value Ref Range    Myeloperoxidase Antibody IgG 1.1 (H) 0.0 - 0.9 AI      Comment:      Positive  Antibody index (AI) values reflect qualitative changes in antibody   concentration that cannot be directly associated with clinical condition or   disease state.      Proteinase 3 Antibody IgG <0.2 0.0 - 0.9 AI      Comment:      Negative  Antibody index (AI) values reflect qualitative changes in antibody   concentration that cannot be directly associated with clinical condition or   disease state.     ALT   Result Value Ref Range    ALT 28 0 - 50 U/L   AST   Result Value Ref Range    AST 18 0 - 45 U/L       If you have any questions or concerns, please call the clinic at the number listed above.       Sincerely,      Majo Mckinnon MD

## 2021-05-08 LAB
CD19 CELLS # BLD: <1 CELLS/UL (ref 107–698)
CD19 CELLS NFR BLD: <1 % (ref 6–27)

## 2021-05-09 ENCOUNTER — HEALTH MAINTENANCE LETTER (OUTPATIENT)
Age: 56
End: 2021-05-09

## 2021-05-10 ENCOUNTER — TELEPHONE (OUTPATIENT)
Dept: RHEUMATOLOGY | Facility: CLINIC | Age: 56
End: 2021-05-10

## 2021-05-10 ENCOUNTER — OFFICE VISIT (OUTPATIENT)
Dept: RHEUMATOLOGY | Facility: CLINIC | Age: 56
End: 2021-05-10
Attending: INTERNAL MEDICINE
Payer: COMMERCIAL

## 2021-05-10 VITALS
WEIGHT: 169.9 LBS | DIASTOLIC BLOOD PRESSURE: 98 MMHG | OXYGEN SATURATION: 99 % | BODY MASS INDEX: 30.1 KG/M2 | SYSTOLIC BLOOD PRESSURE: 158 MMHG | HEART RATE: 87 BPM

## 2021-05-10 DIAGNOSIS — M19.90 INFLAMMATORY ARTHRITIS: ICD-10-CM

## 2021-05-10 DIAGNOSIS — E11.9 TYPE 2 DIABETES MELLITUS NOT AT GOAL (H): ICD-10-CM

## 2021-05-10 DIAGNOSIS — E55.9 VITAMIN D DEFICIENCY: ICD-10-CM

## 2021-05-10 DIAGNOSIS — M31.30 GRANULOMATOSIS WITH POLYANGIITIS WITHOUT RENAL INVOLVEMENT (H): Primary | ICD-10-CM

## 2021-05-10 DIAGNOSIS — K59.09 OTHER CONSTIPATION: ICD-10-CM

## 2021-05-10 LAB
MYELOPEROXIDASE AB SER-ACNC: 1.1 AI (ref 0–0.9)
PROTEINASE3 IGG SER-ACNC: <0.2 AI (ref 0–0.9)

## 2021-05-10 PROCEDURE — G0463 HOSPITAL OUTPT CLINIC VISIT: HCPCS

## 2021-05-10 PROCEDURE — 99214 OFFICE O/P EST MOD 30 MIN: CPT | Performed by: INTERNAL MEDICINE

## 2021-05-10 RX ORDER — PNV NO.95/FERROUS FUM/FOLIC AC 28MG-0.8MG
250 TABLET ORAL 2 TIMES DAILY
Qty: 60 TABLET | Refills: 3 | Status: SHIPPED | OUTPATIENT
Start: 2021-05-10 | End: 2021-11-23

## 2021-05-10 RX ORDER — FOLIC ACID 1 MG/1
TABLET ORAL
Qty: 90 TABLET | Refills: 3 | Status: SHIPPED | OUTPATIENT
Start: 2021-05-10 | End: 2022-04-06

## 2021-05-10 RX ORDER — MULTIVITAMIN WITH FOLIC ACID 400 MCG
1 TABLET ORAL DAILY
Qty: 90 TABLET | Refills: 0 | Status: SHIPPED | OUTPATIENT
Start: 2021-05-10 | End: 2022-07-28

## 2021-05-10 RX ORDER — ERGOCALCIFEROL 1.25 MG/1
50000 CAPSULE, LIQUID FILLED ORAL
Qty: 12 CAPSULE | Refills: 0 | Status: SHIPPED | OUTPATIENT
Start: 2021-05-10 | End: 2021-11-01

## 2021-05-10 RX ORDER — CALCIUM CARBONATE 500 MG/1
3-4 TABLET, CHEWABLE ORAL DAILY PRN
Qty: 90 TABLET | Refills: 3 | Status: SHIPPED | OUTPATIENT
Start: 2021-05-10

## 2021-05-10 ASSESSMENT — PAIN SCALES - GENERAL: PAINLEVEL: SEVERE PAIN (7)

## 2021-05-10 NOTE — LETTER
5/10/2021       RE: Janine Cornell  [address] [facility] [address]     Dear Colleague,    Thank you for referring your patient, Janine Cornell, to the [facility] [place] at [facility]. Please see a copy of my visit note below.    Rheumatology F/U Note    Last visit note: 4/21/2021    Today's visit date: 5/10/2021    Reason for in person visit: F/U GPA      HPI     Ms. 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 She is on HCQ since 5/2014. On rituximab since 12/20218 with great response. Previously tried and did not tolerate MTX, AZA.      She had lateral orbitotomy and debulking orbital mass right eye on 9/26/18 with Dr. Shah and Dr. Valdez at [place]. 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 There were no complications according to the op note.      4/21/2021: Had last rituximab 1 gram on 1/19, 2/2/2021. Reports rituximab starts to wear off earlier, has more sx this time. Eye swelling is intermittent. Gets indigestion/ GERD sx with constipation after the infusion.    She reports having asthma, swelling of neck, arms. She thinks that it could be from her vasculitis. She is worried about going down on rituximab dose.     Otherwise unchanged sx, no SOB or hemoptysis or persistent cough, or     Somedays her knees and hips hurt a lot, feel like bone on bone in her knees, especially on changing position.    Today 5/10/2021:      Joint ache, knees hurt, R elbow hurts, R arm hurts, R hand hurts. Has lots of swelling in her joints especially hands.    Depending on weather, joints jhurt, sometimes pain is 7/10.    Has problem with taking stairs and balance.    Gets off balance.     R eye gets tinglin, swelling.    Gets out of breath, gets worse with seasonal allergies.    Over past month and half, took nitro more,  like 8 times.    No hemooptysis, no hematuria.    Has allergies.    Component      Latest Ref Rng 2/28/2013 2/28/2013          12:14 PM 12:14 PM   WBC      4.0 - 11.0 10e9/L     RBC Count      3.8 - 5.2 10e12/L     Hemoglobin      11.7 - 15.7 g/dL     Hematocrit      35.0 - 47.0 %     MCV      78 - 100 fl     MCH      26.5 - 33.0 pg     MCHC      31.5 - 36.5 g/dL     RDW      10.0 - 15.0 %     Platelet Count      150 - 450 10e9/L     Specimen Description           Lyme Screen IgG and IgM           Vitamin D Deficiency screening      30 - 75 ug/L     Ferritin      10 - 300 ng/mL     Sed Rate      0 - 20 mm/h     ALLI Screen by EIA      <1.0     Rheumatoid Factor      0 - 14 IU/mL     CK Total      30 - 225 U/L  78   Uric Acid      2.5 - 7.5 mg/dL 6.7      Component      Latest Ref Rn 2/28/2013          12:14 PM   WBC      4.0 - 11.0 10e9/L    RBC Count      3.8 - 5.2 10e12/L    Hemoglobin      11.7 - 15.7 g/dL    Hematocrit      35.0 - 47.0 %    MCV      78 - 100 fl    MCH      26.5 - 33.0 pg    MCHC      31.5 - 36.5 g/dL    RDW      10.0 - 15.0 %    Platelet Count      150 - 450 10e9/L    Specimen Description       Serum   Lyme Screen IgG and IgM       Test value: <0.75....Interpretation: Negative....If you highly suspect Lyme . . .   Vitamin D Deficiency screening      30 - 75 ug/L    Ferritin      10 - 300 ng/mL    Sed Rate      0 - 20 mm/h    ALLI Screen by EIA      <1.0    Rheumatoid Factor      0 - 14 IU/mL    CK Total      30 - 225 U/L    Uric Acid      2.5 - 7.5 mg/dL      Component      Latest Ref Rng 2/28/2013 2/28/2013 2/28/2013 2/28/2013          12:14 PM 12:14 PM 12:14 PM 12:14 PM   WBC      4.0 - 11.0 10e9/L       RBC Count      3.8 - 5.2 10e12/L       Hemoglobin      11.7 - 15.7 g/dL       Hematocrit      35.0 - 47.0 %       MCV      78 - 100 fl       MCH      26.5 - 33.0 pg       MCHC      31.5 - 36.5 g/dL       RDW      10.0 - 15.0 %       Platelet Count      150 - 450 10e9/L       Specimen  Description             Lyme Screen IgG and IgM             Vitamin D Deficiency screening      30 - 75 ug/L       Ferritin      10 - 300 ng/mL    10   Sed Rate      0 - 20 mm/h   23 (H)    ALLI Screen by EIA      <1.0  <1.0 . . .     Rheumatoid Factor      0 - 14 IU/mL 26 (H)      CK Total      30 - 225 U/L       Uric Acid      2.5 - 7.5 mg/dL         5/2013  CBC WITH PLATELETS DIFFERENTIAL       Component Value Range    WBC 11.3 (*) 4.0 - 11.0 10e9/L    RBC Count 4.56  3.8 - 5.2 10e12/L    Hemoglobin 11.1 (*) 11.7 - 15.7 g/dL    Hematocrit 34.3 (*) 35.0 - 47.0 %    MCV 75 (*) 78 - 100 fl    MCH 24.3 (*) 26.5 - 33.0 pg    MCHC 32.4  31.5 - 36.5 g/dL    RDW 16.1 (*) 10.0 - 15.0 %    Platelet Count 315  150 - 450 10e9/L    Diff Method Automated Method      % Neutrophils 71.6  40 - 75 %    % Lymphocytes 20.9  20 - 48 %    % Monocytes 4.3  0 - 12 %    % Eosinophils 2.8  0 - 6 %    % Basophils 0.2  0 - 2 %    % Immature Granulocytes 0.2  0 - 0.4 %    Absolute Neutrophil 8.1  1.6 - 8.3 10e9/L    Absolute Lymphocytes 2.4  0.8 - 5.3 10e9/L    Absolute Monoctyes 0.5  0.0 - 1.3 10e9/L    Absolute Eosinophils 0.3  0.0 - 0.7 10e9/L    Absolute Basophils 0.0  0.0 - 0.2 10e9/L    Abs Immature Granulocytes 0.0  0 - 0.03 10e9/L   AMYLASE       Component Value Range    Amylase 103  30 - 110 U/L   COMPREHENSIVE METABOLIC PANEL       Component Value Range    Sodium 144  133 - 144 mmol/L    Potassium 3.8  3.4 - 5.3 mmol/L    Chloride 105  94 - 109 mmol/L    Carbon Dioxide 23  20 - 32 mmol/L    Anion Gap 17  6 - 17 mmol/L    Glucose 173 (*) 60 - 99 mg/dL    Urea Nitrogen 13  5 - 24 mg/dL    Creatinine 0.83  0.52 - 1.04 mg/dL    GFR Estimate 74  >60 mL/min/1.7m2    GFR Estimate If Black 90  >60 mL/min/1.7m2    Calcium 9.7  8.5 - 10.4 mg/dL    Bilirubin Total 0.4  0.2 - 1.3 mg/dL    Albumin 4.3  3.9 - 5.1 g/dL    Protein Total 7.8  6.8 - 8.8 g/dL    Alkaline Phosphatase 66  40 - 150 U/L    ALT 36  0 - 50 U/L    AST 28  0 - 45 U/L   CK  TOTAL       Component Value Range    CK Total 66  30 - 225 U/L   CRP INFLAMMATION       Component Value Range    CRP Inflammation 10.4 (*) 0.0 - 8.0 mg/L   LIPASE       Component Value Range    Lipase 353 (*) 20 - 250 U/L   ERYTHROCYTE SEDIMENTATION RATE AUTO       Component Value Range    Sed Rate 26 (*) 0 - 20 mm/h   ROUTINE UA WITH MICROSCOPIC REFLEX TO CULTURE       Component Value Range    Color Urine Yellow      Appearance Urine Slightly Cloudy      Glucose Urine 30 (*) NEG mg/dL    Bilirubin Urine Negative  NEG    Ketones Urine 5 (*) NEG mg/dL    Specific Gravity Urine 1.026  1.003 - 1.035    Blood Urine Trace (*) NEG    pH Urine 5.0  5.0 - 7.0 pH    Protein Albumin Urine 10 (*) NEG mg/dL    Urobilinogen mg/dL Normal  0.0 - 2.0 mg/dL    Nitrite Urine Negative  NEG    Leukocyte Esterase Urine Negative  NEG    Source Midstream Urine      WBC Urine 1  0 - 2 /HPF    RBC Urine 4 (*) 0 - 2 /HPF    Squamous Epithelial /HPF Urine <1  0 - 1 /HPF    Mucous Urine Present (*) NEG /LPF    Hyaline Casts 3 (*) 0 - 2 /LPF    Calcium Oxalate Moderate (*) NEG /HPF   COMPLEMENT C3       Component Value Range    Complement C3 143  76 - 169 mg/dL   COMPLEMENT C4       Component Value Range    Complement C4 31  15 - 50 mg/dL   HEPATITIS C ANTIBODY       Component Value Range    Hepatitis C Antibody Negative  NEG   CARDIOLIPIN ANTIBODY IGG AND IGM       Component Value Range    Cardiolipin IgG Marline    0 - 15.0 GPL    Value: <15.0      Interpretation:  Negative    Cardiolipin IgM Marline    0 - 12.5 MPL    Value: <12.5      Interpretation:  Negative   LUPUS PANEL       Component Value Range    Lupus Result    NEG    Value: Negative      (Note)      COMMENTS:      The INR is normal.      APTT is normal.  1:2 Mix is not indicated.      DRVVT Screen is normal.      Thrombin time is normal.      NEGATIVE TEST; A LUPUS ANTICOAGULANT WAS NOT DETECTED IN THIS      SPECIMEN WITHIN THE LIMITS OF THE TESTING REPERTOIRE.      If the clinical  picture is strongly suggestive of an antiphospholipid      syndrome, recommend anticardiolipin and beta-2-glycoprotein (IgG and      IgM) antibody tests.      Leela Franks M.D.  [phone]      5/2/2013            INR =  0.93 N = 0.86-1.14  (No additional charge)      TT = 15.7 N = 13.0-19.0 sec  (No additional charge)            APTT'S:    Seconds      Reagent =  Stago LA      Normal  =  38      Patient  =  34      1:2 Mix  =  N/A      Reference =  31-43             DILUTE MADELINE VIPER VENOM TEST:      DRVVT Screen Ratio = 0.76 Normal = <1.21         IMMUNOGLOBULIN G SUBCLASSES       Component Value Range    IGG 1030  695 - 1620 mg/dL    IgG1 488  300 - 856 mg/dL    IgG2 325  158 - 761 mg/dL    IgG3 47  24 - 192 mg/dL    IgG4 18  11 - 86 mg/dL   SUNNY ANTIBODY PANEL       Component Value Range    Ribonucleic Protein IgG Antibody 0      Smith Antibody IgG 1      SSA (RO) Antibody IgG 4      SSB (LA) Antibody IgG 0      Scleroderma Antibody IgG 0     BETA 2 GLYCOPROTEIN ANTIBODIES IGG IGM       Component Value Range    Beta-2-Glycopro IgG 1      Beta-2-Glycopro IgM 5     CYCLIC CIT PEPT IGG       Component Value Range    Cyclic Cit Pept IgG/IgA    <20 UNITS    Value: <20      Interpretation:  Negative   DNA DOUBLE STRANDED ANTIBODIES       Component Value Range    DNA-ds    0 - 29 IU/mL    Value: <15      Interpretation:  Negative       CT CHEST PULMONARY EMBOLISM W CONTRAST 5/20/2015 4:57 PM  HISTORY: Pain. SOB. Elevated d-dimer.   TECHNIQUE: 65 mL Isovue 370. Axial images with coronal  reconstructions.  COMPARISON: None.  FINDINGS: Calcified granuloma with surrounding scarring in the  posterolateral left upper lobe. Triangular shaped opacity at the right  lung base in the lateral right middle lobe could represent some  scarring, atelectasis or infiltrate. There is also some scarring or  atelectasis in the posteromedial right lung base. Lungs otherwise  clear.  The pulmonary arteries are well opacified. No  "CT evidence for acute  pulmonary embolus. No aortic dissection.  Diffuse fatty infiltration of the liver.  IMPRESSION  IMPRESSION:   1. No pulmonary embolus identified.  2. Small focus of atelectasis, infiltrate or scarring in the lateral  right middle lobe.  3. Old granulomatous disease.  4. Otherwise negative.  SILVERIO VAZQUEZ MD    Copath Report      Patient Name: FAVIO MARTINEZ   MR#: [id number]   Specimen #: [id number]   Collected: 3/15/2016   Received: 3/15/2016   Reported: 3/17/2016 13:33   Ordering Phy(s): PARVEEN ENRIQUEZ     ORIGINAL REPORT FOLLOWS ADDENDUM  ADDENDUM     TO ORIGINAL REPORT   Status: Signed Out   Date Ordered:3/18/2016   Date Complete:3/18/2016   Date Reported:3/18/2016 12:06   Signed Out By: Marianne Godfrey MD     INTERPRETATION:   This addendum is done to incorporate the results of fungal (GMS) stains   done on the specimen.  Specimen is negative for fungal organisms.  The   original final diagnosis remains unchanged.     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  Negative for malignancy(Please   see microscopic description)     Electronically signed out by:     Marianne Godfrey MD     CLINICAL HISTORY:   right orbital mass     GROSS:   The specimen is received labeled \"right orbital biopsy\" and consists of   red-tan nodule measuring 1.5 x 0.9 x 0.6 cm with one smooth side and   opposite rough side consistent with periosteum.  The specimen is   bisected revealing homogenous pale tan fleshy cut surface.  Touch   preparations are prepared, air dried and fixed, portion of specimen is   submitted in RPMI for possible lymphoma workup [facility],   ([facility], [place], [place] ).  The remainder is entirely submitted.   (Dictated by: Marianne Godfrey MD 3/15/2016 04:45 " PM)     MICROSCOPIC:     Specimen consists of portion of lacrimal gland with acute and chronic   inflammation.  Focal area of microabscess formation associated with   small areas of necrosis are also present.  Inflammation is seen   extending to the periorbital adipose tissue forming panniculitis.   Specimen is negative for malignancy.  Samples sent for immunophenotyping   to [facility], ([facility], [place], [place] ) reveals no evidence   of monoclonality or aberrant antigen expression.  A GMS (fungal) stain   is pending and results will be reported as an addendum.     CPT Codes:   A: 83102-WK7, 79891-EWZTK, SOH, 78747-TUSED, 32870-RORU     TESTING LAB LOCATION:   [address]  [address] [phone]     COLLECTION SITE:   Client: [facility]   Location: [facility] (S)            Component      Latest Ref Rng 4/29/2016   Nucleated RBCs      0 /100 0   Absolute Neutrophil      1.6 - 8.3 10e9/L 8.9 (H)   Absolute Lymphocytes      0.8 - 5.3 10e9/L 3.2   Absolute Monocytes      0.0 - 1.3 10e9/L 0.8   Absolute Eosinophils      0.0 - 0.7 10e9/L 0.2   Absolute Basophils      0.0 - 0.2 10e9/L 0.1   Abs Immature Granulocytes      0 - 0.4 10e9/L 0.1   Absolute Nucleated RBC       0.0   IGG      695 - 1620 mg/dL 836   IgG1      300 - 856 mg/dL 280 (L)   IgG2      158 - 761 mg/dL 277   IgG3      24 - 192 mg/dL 35   IgG4      11 - 86 mg/dL 16   RNP Antibody IgG      0.0 - 0.9 AI <0.2 . . .   Smith SUNNY Antibody IgG      0.0 - 0.9 AI <0.2 . . .   SSA (Ro) (SUNNY) Antibody, IgG      0.0 - 0.9 AI <0.2 . . .   SSB (La) (SUNNY) Antibody, IgG      0.0 - 0.9 AI <0.2 . . .   Scleroderma Antibody Scl-70 SUNNY IgG      0.0 - 0.9 AI <0.2 . . .   Cholesterol      <200 mg/dL 154   Triglycerides      <150 mg/dL 220 (H)   HDL Cholesterol      >49 mg/dL 42 (L)   LDL Cholesterol Calculated      <100 mg/dL 67   Non HDL Cholesterol      <130 mg/dL 111   M Tuberculosis Result       NEG Negative   M Tuberculosis Antigen Value       0.00   Albumin      3.4 - 5.0 g/dL 4.3   ALT      0 - 50 U/L 30   AST      0 - 45 U/L 10   Complement C3      76 - 169 mg/dL 157   Complement C4      15 - 50 mg/dL 32   CRP Inflammation      0.0 - 8.0 mg/L <2.9   Sed Rate      0 - 20 mm/h 2   DNA-ds      <10 IU/mL 1   Cyclic Citrullinated Peptide Antibody, IgG      <7 U/mL 1   Rheumatoid Factor      <20 IU/mL <20   Proteinase 3 Antibody IgG      0.0 - 0.9 AI <0.2 . . .   Myeloperoxidase Antibody IgG      0.0 - 0.9 AI 2.5 (H)   Vitamin D Deficiency screening      20 - 75 ug/L 24   Hemoglobin A1C      4.3 - 6.0 % 7.9 (H)   ALLI by IFA IgG       1:40 (A) . . .     Component      Latest Ref Rng & Units 1/16/2021   WBC      4.0 - 11.0 10e9/L    RBC Count      3.8 - 5.2 10e12/L    Hemoglobin      11.7 - 15.7 g/dL    Hematocrit      35.0 - 47.0 %    MCV      78 - 100 fl    MCH      26.5 - 33.0 pg    MCHC      31.5 - 36.5 g/dL    RDW      10.0 - 15.0 %    Platelet Count      150 - 450 10e9/L    Diff Method          % Neutrophils      %    % Lymphocytes      %    % Monocytes      %    % Eosinophils      %    % Basophils      %    % Immature Granulocytes      %    Nucleated RBCs      0 /100    Absolute Neutrophil      1.6 - 8.3 10e9/L    Absolute Lymphocytes      0.8 - 5.3 10e9/L    Absolute Monocytes      0.0 - 1.3 10e9/L    Absolute Eosinophils      0.0 - 0.7 10e9/L    Absolute Basophils      0.0 - 0.2 10e9/L    Abs Immature Granulocytes      0 - 0.4 10e9/L    Absolute Nucleated RBC          IGG      610 - 1,616 mg/dL    IGA      84 - 499 mg/dL    IGM      35 - 242 mg/dL    Creatinine      0.52 - 1.04 mg/dL    GFR Estimate      >60 mL/min/1.73:m2    GFR Estimate If Black      >60 mL/min/1.73:m2    COVID-19 Virus PCR to [facility] - Source       Nasopharyngeal   COVID-19 Virus PCR to [facility] - Result       Not Detected   Neutrophil Cytoplasmic Antibody      <1:10 titer    Neutrophil Cytoplasmic Antibody Pattern          CD19 B  Cells      6 - 27 %    Absolute CD19      107 - 698 cells/uL    Myeloperoxidase Antibody IgG      0.0 - 0.9 AI    Proteinase 3 Antibody IgG      0.0 - 0.9 AI    Vitamin D Deficiency screening      20 - 75 ug/L    Sed Rate      0 - 30 mm/h    CRP Inflammation      0.0 - 8.0 mg/L    Albumin      3.4 - 5.0 g/dL    ALT      0 - 50 U/L    AST      0 - 45 U/L      Component      Latest Ref Rng & Units 5/7/2021   WBC      4.0 - 11.0 10e9/L 8.9   RBC Count      3.8 - 5.2 10e12/L 4.89   Hemoglobin      11.7 - 15.7 g/dL 12.7   Hematocrit      35.0 - 47.0 % 39.7   MCV      78 - 100 fl 81   MCH      26.5 - 33.0 pg 26.0 (L)   MCHC      31.5 - 36.5 g/dL 32.0   RDW      10.0 - 15.0 % 13.7   Platelet Count      150 - 450 10e9/L 336   Diff Method       Automated Method   % Neutrophils      % 65.3   % Lymphocytes      % 20.7   % Monocytes      % 7.8   % Eosinophils      % 5.3   % Basophils      % 0.7   % Immature Granulocytes      % 0.2   Nucleated RBCs      0 /100 0   Absolute Neutrophil      1.6 - 8.3 10e9/L 5.8   Absolute Lymphocytes      0.8 - 5.3 10e9/L 1.8   Absolute Monocytes      0.0 - 1.3 10e9/L 0.7   Absolute Eosinophils      0.0 - 0.7 10e9/L 0.5   Absolute Basophils      0.0 - 0.2 10e9/L 0.1   Abs Immature Granulocytes      0 - 0.4 10e9/L 0.0   Absolute Nucleated RBC       0.0   IGG      610 - 1,616 mg/dL 649   IGA      84 - 499 mg/dL 199   IGM      35 - 242 mg/dL 36   Creatinine      0.52 - 1.04 mg/dL 0.96   GFR Estimate      >60 mL/min/1.73:m2 66   GFR Estimate If Black      >60 mL/min/1.73:m2 77   COVID-19 Virus PCR to [facility] - Source          COVID-19 Virus PCR to [facility] - Result          Neutrophil Cytoplasmic Antibody      <1:10 titer <1:10   Neutrophil Cytoplasmic Antibody Pattern       The ANCA IFA is <1:10.  No further testing will be performed.   CD19 B Cells      6 - 27 % <1 (L)   Absolute CD19      107 - 698 cells/uL <1 (L)   Myeloperoxidase Antibody IgG      0.0 - 0.9 AI 1.1 (H)   Proteinase 3 Antibody IgG       0.0 - 0.9 AI <0.2   Vitamin D Deficiency screening      20 - 75 ug/L 41   Sed Rate      0 - 30 mm/h 9   CRP Inflammation      0.0 - 8.0 mg/L <2.9   Albumin      3.4 - 5.0 g/dL 4.1   ALT      0 - 50 U/L 28   AST      0 - 45 U/L 18     ROS: A comprehensive ROS was done. Positives are per HPI.          HISTORY REVIEW:  Past Medical History:   Diagnosis Date     Abnormal glandular Papanicolaou smear of cervix 1992     Allergic rhinitis     Allergy, airborne subst     Arthritis      ASCVD (arteriosclerotic cardiovascular disease)      Chronic pain      Chronic pancreatitis (H)     idiopathic, Tx: PPI, antioxidants, pancreatic enzymes     Common migraine      Coronary artery disease      Costochondritis      Difficulty in walking(719.7)      Dyspnea on exertion      Ectasia, mammary duct     [facility], persistent nipple discharge     Elevated fasting glucose      Gastro-oesophageal reflux disease      Hernia      History of angina      Hyperlipidemia LDL goal < 100      Hypertension goal BP (blood pressure) < 140/90     Essential hypertension     Iron deficiency anemia      Ischemic cardiomyopathy      Menorrhagia      Migraine headaches      Mild persistent asthma      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 Matias, neg w/u, Rx'd w/prednisone     Ventral  hernia, unspecified, without mention of obstruction or gangrene 2012     Past Surgical History:   Procedure Laterality Date     C ECHO HEART XTHORACIC,COMPLETE, W/O DOPPLER  04    [facility] WNL, EF 60%     C/SECTION, LOW TRANSVERSE           CARDIAC SURGERY      cardiac stent- recent in 16; 4 other stents     DILATION AND CURETTAGE N/A 2016    Procedure: DILATION AND CURETTAGE;  Surgeon: Nahed Butler MD;  Location: [facility]     HC UGI ENDOSCOPY W EUS  08    Dr. Pastrana, possible chronic pancreatitis, fatty liver     HERNIA REPAIR  2012    done at [facility]     INSERT INTRAUTERINE DEVICE N/A 2016    Procedure: INSERT INTRAUTERINE DEVICE;  Surgeon: Nahed Butler MD;  Location: [facility]     INT UTERINE FIBRIOD EMBOLIZATION  10/29/2014     LAPAROSCOPIC CHOLECYSTECTOMY  08    Dr. Arnol GRUBBS TX, CERVICAL      s/p LEEP     ORBITOTOMY Right 3/15/2016    Procedure: ORBITOTOMY;  Surgeon: Myron Cyr MD;  Location:  [facility]     ORBITOTOMY Right 2017    Procedure: ORBITOTOMY;  RIGHT ORBITOTOMY AND BIOPSY;  Surgeon: Charis Holbrook MD;  Location: [facility]     REPAIR PTOSIS Right 2017    Procedure: REPAIR PTOSIS;  RIGHT UPPER LID PTOSIS REPAIR;  Surgeon: Myron Cyr MD;  Location[facility]     UPPER GI ENDOSCOPY  10/21/08    mild gastritis, Dr. Hidalgo     Family History   Problem Relation Age of Onset     Heart Disease Father 50        heart attack     Cerebrovascular Disease Father      Diabetes Father      Hypertension Father      Depression Father      C.A.D. Father      Hypertension Mother      Arthritis Mother      Heart Disease Mother         long qt syndrome     Thyroid Disease Mother      C.A.D. Mother      Heart Disease Brother 15        MI at 15, 16.      Diabetes Maternal Uncle      Hypertension Maternal Aunt      Hypertension Maternal Uncle      Arthritis Brother         he passed away, had severe arthritis at age 11     Arthritis  Maternal Uncle      Eye Disorder Maternal Uncle         cataracts     Neurologic Disorder Sister         migraines     Neurologic Disorder Sister         migraines     Respiratory Son         asthma     Cerebrovascular Disease Maternal Uncle      C.A.D. Brother      Family History Negative Other         neg for RA, SLE     Unknown/Adopted No family hx of         unknown neurological issues in her family, mother was adopted     Skin Cancer No family hx of         No known family hx of skin cancer     Social History     Socioeconomic History     Marital status: Single     Spouse name: Not on file     Number of children: 1     Years of education: Not on file     Highest education level: Not on file   Occupational History     Employer: NONE    Social Needs     Financial resource strain: Not on file     Food insecurity     Worry: Not on file     Inability: Not on file     Transportation needs     Medical: Not on file     Non-medical: Not on file   Tobacco Use     Smoking status: Current Every Day Smoker     Packs/day: 0.20     Years: 1.00     Pack years: 0.20     Types: Cigarettes     Last attempt to quit: 2016     Years since quittin.2     Smokeless tobacco: Never Used   Substance and Sexual Activity     Alcohol use: No     Alcohol/week: 0.0 standard drinks     Drug use: No     Sexual activity: Not Currently   Lifestyle     Physical activity     Days per week: Not on file     Minutes per session: Not on file     Stress: Not on file   Relationships     Social connections     Talks on phone: Not on file     Gets together: Not on file     Attends [employer] service: Not on file     Active member of club or organization: Not on file     Attends meetings of clubs or organizations: Not on file     Relationship status: Not on file     Intimate partner violence     Fear of current or ex partner: Not on file     Emotionally abused: Not on file     Physically abused: Not on file     Forced sexual activity: Not on file    Other Topics Concern     Parent/sibling w/ CABG, MI or angioplasty before 65F 55M? Yes   Social History Narrative    Balanced Diet - sometimes    Osteoporosis Prevention Measures - Dairy servings per day: 2 servings weekly    Regular Exercise -  Yes Describe walking 4 times a week    Dental Exam - NO    Seatbelts used - Yes    Self Breast Exam - Yes    Abuse: Current or Past (Physical, Sexual or Emotional)- No    Do you have any concerns about STD's -  No    Do you feel safe in your environment - No    Guns stored in the home - No    Sunscreen used - Yes    Lipids -  YES - Date: 053102    Glucose -  YES - Date: 012804    Eye Exam - YES - Date: one year ago    Colon Cancer Screening - No    Hemoccults - NO    Pap Test -  YES - Date: 070904, remote history of LEEP    Mammography - YES - Date: last spring, would like to discuss, needs a referral to [facility]    DEXA - NO    Immunizations reviewed and up to date - Yes, last td given in 1997 or 1998     Patient Active Problem List   Diagnosis     Seasonal affective disorder (H)     Allergic rhinitis     PCOS (polycystic ovarian syndrome)     Moderate persistent asthma     Chronic pancreatitis (H)     Hypertension goal BP (blood pressure) < 140/90     Common migraine     NSTEMI (non-ST elevated myocardial infarction) (H)     Hyperlipidemia LDL goal <70     ASCVD (arteriosclerotic cardiovascular disease)     GERD (gastroesophageal reflux disease)     Ischemic cardiomyopathy     Hypertensive heart disease     Uterine leiomyoma     Iron deficiency anemia     Costochondritis     Vitamin D deficiency     Breast cancer screening     Spinal stenosis in cervical region     Fibromyalgia     Seronegative rheumatoid arthritis (H)     Type 2 diabetes, HbA1C goal < 8% (H)     Type 2 diabetes mellitus with other specified complication (H)     Hyperlipidemia associated with type 2 diabetes mellitus (H)     Hypertension associated with diabetes (H)     Overweight with  body mass index (BMI) 25.0-29.9     Tobacco use disorder     Telogen effluvium     CAD S/P percutaneous coronary angioplasty     Status post coronary angiogram     ANCA-associated vasculitis (H)     Wegener's vasculitis (H)     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 All misscarriages were in first trimester. H/o OC use in the past. 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    PMHx, FHx, SHx were reviewed, unchanged.      Outpatient Encounter Medications as of 5/10/2021   Medication Sig Dispense Refill     acetaminophen (TYLENOL) 325 MG tablet Take 1-2 tablets (325-650 mg) by mouth every 6 hours as needed for pain (headache) 250 tablet 4     albuterol (2.5 MG/3ML) 0.083% neb solution INL 1 VIAL VIA NEBULIZATION Q 4 TO 6 HOURS PRN  1     albuterol (PROAIR HFA, PROVENTIL HFA, VENTOLIN HFA) 108 (90 BASE) MCG/ACT inhaler Inhale 2 puffs into the lungs every 6 hours as needed for shortness of breath / dyspnea or wheezing 3 Inhaler 1     ASPIRIN LOW DOSE 81 MG EC tablet TAKE 1 TABLET(81 MG) BY MOUTH DAILY 30 tablet 11     blood glucose monitoring (NO BRAND SPECIFIED) meter device kit Use to test blood sugar 4 X times daily or as directed. (Patient taking differently: 1 kit Use to test blood sugar 4 X times daily or as directed.) 1 kit 0     blood glucose monitoring (NO BRAND SPECIFIED) test strip 1 strip by In Vitro route 4 times daily Test as directed. Please dispense three months and three months of refills. Thank you. (Patient taking differently: 1 strip by In Vitro route 4 times daily Test as directed. Please dispense three months and three months of refills. Thank you.) 360 each 3     Blood Pressure Monitor KIT 1 each daily Monitor home blood pressure as instructed by physician.  Dispense [place] blood pressure kit. 1 kit 0     calcium carbonate (TUMS) 500 MG chewable tablet Take 3-4 chew tab by mouth daily as needed.       clopidogrel  (PLAVIX) 75 MG tablet Take 1 tablet (75 mg) by mouth daily 30 tablet 11     COMPRESSION STOCKINGS 2 each daily Apply one 10-15 mmHg compression stocking to each lower extgmierty during the day and remove before bedtime. 4 each 2     cyclobenzaprine (FLEXERIL) 10 MG tablet Take 1 tablet (10 mg) by mouth 2 times daily as needed for muscle spasms 60 tablet 3     cycloSPORINE (RESTASIS) 0.05 % ophthalmic emulsion Place 1 drop into both eyes 2 times daily 60 each 11     cycloSPORINE (RESTASIS) 0.05 % ophthalmic emulsion Place 1 drop into the right eye every 12 hours       desonide (DESOWEN) 0.05 % cream Apply topically 2 times daily       diclofenac (VOLTAREN) 1 % topical gel Apply 2 g topically 4 times daily Apply to affected joints 100 g 3     dicyclomine (BENTYL) 20 MG tablet TAKE 1 TABLET(20 MG) BY MOUTH FOUR TIMES DAILY AS NEEDED. Pls schedule clinic appt for refills. 60 tablet 0     diphenhydrAMINE (BENADRYL) 25 MG capsule Take 1 capsule (25 mg) by mouth nightly as needed for itching or allergies 30 capsule 2     EPIPEN 2-RIKY 0.3 MG/0.3ML injection INJECT 0.3 MG INTO THE MUSCLE PRF ANAPHYALAXIS  0     ferrous gluconate (FERGON) 324 (38 Fe) MG tablet Take 1 tablet (324 mg) by mouth 2 times daily (with meals) DO NOT CRUSH. Absorbed best on an empty stomach. If stomach upset occurs, can take with meals. For additional refills, please schedule a follow-up appointment with Dr Solano at [phone] 180 tablet 0     fexofenadine (ALLEGRA) 180 MG tablet Take 1 tablet by mouth daily as needed. 90 tablet 3     fluocinolone (SYNALAR) 0.01 % solution Apply topically daily as needed       fluocinonide (LIDEX) 0.05 % external ointment Apply topically as needed       fluticasone-vilanterol (BREO ELLIPTA) 100-25 MCG/INH inhaler Inhale 1 puff into the lungs daily       folic acid (FOLVITE) 1 MG tablet Take 1 tablet by mouth daily 90 tablet 3     hydroxychloroquine (PLAQUENIL) 200 MG tablet Take 2 tablets (400 mg) by mouth daily  (Annual eye exam/plaquenil screening) 180 tablet 0     insulin pen needle (BD MARCK U/F) 32G X 4 MM USE DAILY OR AS DIRECTED 300 each 3     ketoconazole (NIZORAL) 2 % shampoo Apply topically daily as needed       Ketoprofen POWD 1 g 2 times daily as needed (prn pain) 500 g 3     lidocaine (LMX4) 4 % external cream Apply topically once as needed for mild pain (prn pain) 30 g 3     lifitegrast (XIIDRA) 5 % opthalmic solution Place 1 drop into both eyes 2 times daily 20 each 3     lisinopril-hydrochlorothiazide (ZESTORETIC) 20-25 MG tablet Take 1 tablet by mouth daily 30 tablet 6     Magnesium Oxide -Mg Supplement 250 MG TABS TK 1 T PO BID. REDUCE IF STOOLS LOOSEN  11     metFORMIN (GLUCOPHAGE-XR) 500 MG 24 hr tablet TAKE 2 TABLETS(1000 MG) BY MOUTH DAILY WITH DINNER (Patient taking differently: Take 2,000 mg by mouth daily ) 180 tablet 0     metoclopramide (REGLAN) 10 MG tablet Take 1 tablet (10 mg) by mouth 4 times daily (before meals and nightly) 90 tablet 0     metoprolol succinate ER (TOPROL-XL) 100 MG 24 hr tablet Take 1 tablet (100 mg) by mouth daily 30 tablet 11     metroNIDAZOLE (NORITATE) 1 % cream Apply topically daily       montelukast (SINGULAIR) 10 MG tablet Take 1 tablet (10 mg) by mouth At Bedtime 30 tablet 1     Multiple Vitamin (DAILY-GERALD) TABS Take 1 tablet by mouth daily  0     nitroGLYcerin (NITROSTAT) 0.4 MG sublingual tablet Place 1 tablet (0.4 mg) under the tongue every 5 minutes as needed for chest pain . After 3 doses, if pain persists call 911. 25 tablet 3     nystatin (MYCOSTATIN) 344489 UNITS TABS tablet TK 2 TS PO BID  0     ondansetron (ZOFRAN-ODT) 8 MG ODT tab Take 1 tablet (8 mg) by mouth every 8 hours as needed for nausea 60 tablet 1     pantoprazole (PROTONIX) 40 MG EC tablet Take 1 tablet (40 mg) by mouth daily Pork free tablets. (Patient taking differently: Take 40 mg by mouth as needed Pork free tablets.) 30 tablet 1     pravastatin (PRAVACHOL) 40 MG tablet Take 1 tablet (40 mg) by  mouth daily 30 tablet 11     ranitidine (ZANTAC) 150 MG tablet Take 1 tablet (150 mg) by mouth 2 times daily 180 tablet 1     sennosides (SENOKOT) 8.6 MG tablet 1-2 tabs a day as needed for constipation 60 tablet 1     spironolactone (ALDACTONE) 25 MG tablet Take 1 tablet (25 mg) by mouth daily TAKE 1 TABLET BY MOUTH EVERY DAY TAKE ADDITIONAL 1/2 TABLET BY MOUTH EVERY DAY AS NEEDED FOR WEIGHT GAIN 30 tablet 11     sucralfate (CARAFATE) 1 GM tablet Take 1 tablet (1 g) by mouth 4 times daily 360 tablet 0     traMADol (ULTRAM) 50 MG tablet Take 1 tablet (50 mg) by mouth every 8 hours as needed for moderate pain 60 tablet 3     Triamcinolone Acetonide (NASACORT ALLERGY 24HR NA)        vitamin D2 (ERGOCALCIFEROL) 48444 units (1250 mcg) capsule Take 1 capsule (50,000 Units) by mouth every 7 days 12 capsule 0     vitamin D3 (CHOLECALCIFEROL) 2000 units (50 mcg) tablet Take 1 tablet (2,000 Units) by mouth daily 90 tablet 1     No facility-administered encounter medications on file as of 5/10/2021.        Allergies   Allergen Reactions     Amoxicillin-Pot Clavulanate      Augmentin      Unknown possible hives and edema     Azithromycin      Diatrizoate Other (See Comments)     Pt wants this listed because she is allergic to shellfish      Imitrex [Sumatriptan]      Severe face/neck/chest tightness and flushing side effects      Penicillins Hives     Unknown      Pork Allergy      Stomach pain, cramping, diarrhea, itching, nausea and headaches     Shellfish Allergy Hives and Swelling     Sulfa Drugs Hives and Swelling     Zithromax [Azithromycin Dihydrate] Swelling     Unknown          Ph.E:    Constitutional: WD/WN. Pleasant. In no acute distress.   Eyes: Swelling around R eye significantly improved, not clear swelling today. EOMI.   HEENT: No oral ulcers or thrush. Normal salivary pool.  Lymph: No cervical, supraclavicular LAD.  Chest: Clear to auscultation bilaterally  CV: RRR, no murmurs/ rubs or gallops. No edema,  clubbing or cyanosis.   GI: Abdomen is soft and non tender.  MS: No synovitis or joint tenderness. Cool joints.    Skin: No rashes or lesions noted. No malar rash.  Neuro: A&O x 3. Grossly non focal, muscular power 5/5 in all ext  Psych: NL affect and mood    Assessment/ plan:    1-Seropositive non-erosive RA (arthritis, AM stiffness, high CRP, RF 26 but re-check neg 3/2015 on HCQ) Dx 5/2013, FMS, new pleuritic CP 3/20-15-5/2015 resolved on steroids. She is on  mg bid since 5/2014. She tolerates it well and it's helping some but not enough to control all her pain. Continues having flare ups of joint pain, could be GPA related. Some pain is due to FMS.    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 Prednisone was not a good option for her given weight gain, diabetes. She tried and did not tolerate MTX, AZA.    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 Repeat MPO was positive in 6/2017.    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 Shah and Dr. Valdez at [place]. 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     She is on rituximab since 12/12/2018 with great response, minor allergic reaction which could be managed by pre-meds and extra steroid dose.      Stable GPA but rituximab does not last 6 months, will give it every 5 months. According to ACR guideline could give every 4-6 months. Stable labs. Improved eye swelling but like to get follow up orbit CT.    Had last rituximab 1 gram on 1/19, 2/2/2021.      Today's plan:     mg bid with annual eye exam    Follow up with Dr. Saulo lópez for joint pain    Rituximab 1 gram at the end of this month    Please schedule CT of the orbit    Eye exam    Keep 8/20/2021 appointment      My impression is that her arthralgias are a combination of RA/ IA  sec GPA, fibromyalgia and chronic pain.  Stopped HCQ in the past shortly after resumed taking it as arthralgia recurred.     2-Fad pads. She was seen by endocrinology and cushing was ruled out in 4/2014. Was advised to do f/u for enlarged thyroid/thyroid nodules.    3-Hair loss. F/U with dermatology    4-Chronic pain. F/U with pain clinic    5-Concerns of subclinical hyperthyroidism: TSH is barely minimal, chart review shows that those lowest levels are since April 2014. At previous visits, discussed Endocrinology ref which pt wants to hold off in this visit.     6-Vit D deficiency. Was replaced with vit D 50, 000 units po qwk x 12 wk then 2000 units every day    7-Upper extremity swelling. Recent mammogram without suggestion of malignancy. No LAD of axillary region. Arms appear normal on physical exam, however patient reports intermittent swelling.  - Referral to lymphedema clinic was done in the past.            Majo Mckinnon MD      Orders Placed This Encounter   Procedures     CT Orbits wo Contrast

## 2021-05-10 NOTE — PATIENT INSTRUCTIONS
Voltaren gel for joint pain    Rituximab 1 gram at the end of this month    Please schedule CT of the orbit    Eye exam    Keep 8/20/2021 appointment

## 2021-05-10 NOTE — TELEPHONE ENCOUNTER
----- Message from Majo Mckinnon MD sent at 5/10/2021  3:45 PM CDT -----  Regarding: rituximab 1 gram one dose at the end of May  Could you help me set that up with pre-meds? Thanks

## 2021-05-10 NOTE — PROGRESS NOTES
Rheumatology F/U Note    Last visit note: 4/21/2021    Today's visit date: 5/10/2021    Reason for in person visit: F/U GPA      HPI     Ms. 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 She is on HCQ since 5/2014. On rituximab since 12/20218 with great response. Previously tried and did not tolerate MTX, AZA.      She had lateral orbitotomy and debulking orbital mass right eye on 9/26/18 with Dr. Shah and Dr. Valdez at [place]. 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 There were no complications according to the op note.      4/21/2021: Had last rituximab 1 gram on 1/19, 2/2/2021. Reports rituximab starts to wear off earlier, has more sx this time. Eye swelling is intermittent. Gets indigestion/ GERD sx with constipation after the infusion.    She reports having asthma, swelling of neck, arms. She thinks that it could be from her vasculitis. She is worried about going down on rituximab dose.     Otherwise unchanged sx, no SOB or hemoptysis or persistent cough, or     Somedays her knees and hips hurt a lot, feel like bone on bone in her knees, especially on changing position.    Today 5/10/2021:      Joint ache, knees hurt, R elbow hurts, R arm hurts, R hand hurts. Has lots of swelling in her joints especially hands.    Depending on weather, joints jhurt, sometimes pain is 7/10.    Has problem with taking stairs and balance.    Gets off balance.     R eye gets tinglin, swelling.    Gets out of breath, gets worse with seasonal allergies.    Over past month and half, took nitro more, like 8 times.    No hemooptysis, no hematuria.    Has allergies.    Component      Latest Ref Rng 2/28/2013 2/28/2013          12:14 PM 12:14 PM   WBC      4.0 - 11.0 10e9/L     RBC Count      3.8 - 5.2 10e12/L     Hemoglobin      11.7 - 15.7 g/dL     Hematocrit      35.0 - 47.0 %     MCV      78 - 100 fl     MCH       26.5 - 33.0 pg     MCHC      31.5 - 36.5 g/dL     RDW      10.0 - 15.0 %     Platelet Count      150 - 450 10e9/L     Specimen Description           Lyme Screen IgG and IgM           Vitamin D Deficiency screening      30 - 75 ug/L     Ferritin      10 - 300 ng/mL     Sed Rate      0 - 20 mm/h     ALLI Screen by EIA      <1.0     Rheumatoid Factor      0 - 14 IU/mL     CK Total      30 - 225 U/L  78   Uric Acid      2.5 - 7.5 mg/dL 6.7      Component      Latest Ref [facility] 2/28/2013          12:14 PM   WBC      4.0 - 11.0 10e9/L    RBC Count      3.8 - 5.2 10e12/L    Hemoglobin      11.7 - 15.7 g/dL    Hematocrit      35.0 - 47.0 %    MCV      78 - 100 fl    MCH      26.5 - 33.0 pg    MCHC      31.5 - 36.5 g/dL    RDW      10.0 - 15.0 %    Platelet Count      150 - 450 10e9/L    Specimen Description       Serum   Lyme Screen IgG and IgM       Test value: <0.75....Interpretation: Negative....If you highly suspect Lyme . . .   Vitamin D Deficiency screening      30 - 75 ug/L    Ferritin      10 - 300 ng/mL    Sed Rate      0 - 20 mm/h    ALLI Screen by EIA      <1.0    Rheumatoid Factor      0 - 14 IU/mL    CK Total      30 - 225 U/L    Uric Acid      2.5 - 7.5 mg/dL      Component      Latest Ref [facility] 2/28/2013 2/28/2013 2/28/2013 2/28/2013          12:14 PM 12:14 PM 12:14 PM 12:14 PM   WBC      4.0 - 11.0 10e9/L       RBC Count      3.8 - 5.2 10e12/L       Hemoglobin      11.7 - 15.7 g/dL       Hematocrit      35.0 - 47.0 %       MCV      78 - 100 fl       MCH      26.5 - 33.0 pg       MCHC      31.5 - 36.5 g/dL       RDW      10.0 - 15.0 %       Platelet Count      150 - 450 10e9/L       Specimen Description             Lyme Screen IgG and IgM             Vitamin D Deficiency screening      30 - 75 ug/L       Ferritin      10 - 300 ng/mL    10   Sed Rate      0 - 20 mm/h   23 (H)    ALLI Screen by EIA      <1.0  <1.0 . . .     Rheumatoid Factor      0 - 14 IU/mL 26 (H)      CK Total      30 - 225 U/L       Uric Acid       2.5 - 7.5 mg/dL         5/2013  CBC WITH PLATELETS DIFFERENTIAL       Component Value Range    WBC 11.3 (*) 4.0 - 11.0 10e9/L    RBC Count 4.56  3.8 - 5.2 10e12/L    Hemoglobin 11.1 (*) 11.7 - 15.7 g/dL    Hematocrit 34.3 (*) 35.0 - 47.0 %    MCV 75 (*) 78 - 100 fl    MCH 24.3 (*) 26.5 - 33.0 pg    MCHC 32.4  31.5 - 36.5 g/dL    RDW 16.1 (*) 10.0 - 15.0 %    Platelet Count 315  150 - 450 10e9/L    Diff Method Automated Method      % Neutrophils 71.6  40 - 75 %    % Lymphocytes 20.9  20 - 48 %    % Monocytes 4.3  0 - 12 %    % Eosinophils 2.8  0 - 6 %    % Basophils 0.2  0 - 2 %    % Immature Granulocytes 0.2  0 - 0.4 %    Absolute Neutrophil 8.1  1.6 - 8.3 10e9/L    Absolute Lymphocytes 2.4  0.8 - 5.3 10e9/L    Absolute Monoctyes 0.5  0.0 - 1.3 10e9/L    Absolute Eosinophils 0.3  0.0 - 0.7 10e9/L    Absolute Basophils 0.0  0.0 - 0.2 10e9/L    Abs Immature Granulocytes 0.0  0 - 0.03 10e9/L   AMYLASE       Component Value Range    Amylase 103  30 - 110 U/L   COMPREHENSIVE METABOLIC PANEL       Component Value Range    Sodium 144  133 - 144 mmol/L    Potassium 3.8  3.4 - 5.3 mmol/L    Chloride 105  94 - 109 mmol/L    Carbon Dioxide 23  20 - 32 mmol/L    Anion Gap 17  6 - 17 mmol/L    Glucose 173 (*) 60 - 99 mg/dL    Urea Nitrogen 13  5 - 24 mg/dL    Creatinine 0.83  0.52 - 1.04 mg/dL    GFR Estimate 74  >60 mL/min/1.7m2    GFR Estimate If Black 90  >60 mL/min/1.7m2    Calcium 9.7  8.5 - 10.4 mg/dL    Bilirubin Total 0.4  0.2 - 1.3 mg/dL    Albumin 4.3  3.9 - 5.1 g/dL    Protein Total 7.8  6.8 - 8.8 g/dL    Alkaline Phosphatase 66  40 - 150 U/L    ALT 36  0 - 50 U/L    AST 28  0 - 45 U/L   CK TOTAL       Component Value Range    CK Total 66  30 - 225 U/L   CRP INFLAMMATION       Component Value Range    CRP Inflammation 10.4 (*) 0.0 - 8.0 mg/L   LIPASE       Component Value Range    Lipase 353 (*) 20 - 250 U/L   ERYTHROCYTE SEDIMENTATION RATE AUTO       Component Value Range    Sed Rate 26 (*) 0 - 20 mm/h   ROUTINE UA  WITH MICROSCOPIC REFLEX TO CULTURE       Component Value Range    Color Urine Yellow      Appearance Urine Slightly Cloudy      Glucose Urine 30 (*) NEG mg/dL    Bilirubin Urine Negative  NEG    Ketones Urine 5 (*) NEG mg/dL    Specific Gravity Urine 1.026  1.003 - 1.035    Blood Urine Trace (*) NEG    pH Urine 5.0  5.0 - 7.0 pH    Protein Albumin Urine 10 (*) NEG mg/dL    Urobilinogen mg/dL Normal  0.0 - 2.0 mg/dL    Nitrite Urine Negative  NEG    Leukocyte Esterase Urine Negative  NEG    Source Midstream Urine      WBC Urine 1  0 - 2 /HPF    RBC Urine 4 (*) 0 - 2 /HPF    Squamous Epithelial /HPF Urine <1  0 - 1 /HPF    Mucous Urine Present (*) NEG /LPF    Hyaline Casts 3 (*) 0 - 2 /LPF    Calcium Oxalate Moderate (*) NEG /HPF   COMPLEMENT C3       Component Value Range    Complement C3 143  76 - 169 mg/dL   COMPLEMENT C4       Component Value Range    Complement C4 31  15 - 50 mg/dL   HEPATITIS C ANTIBODY       Component Value Range    Hepatitis C Antibody Negative  NEG   CARDIOLIPIN ANTIBODY IGG AND IGM       Component Value Range    Cardiolipin IgG Marline    0 - 15.0 GPL    Value: <15.0      Interpretation:  Negative    Cardiolipin IgM Marline    0 - 12.5 MPL    Value: <12.5      Interpretation:  Negative   LUPUS PANEL       Component Value Range    Lupus Result    NEG    Value: Negative      (Note)      COMMENTS:      The INR is normal.      APTT is normal.  1:2 Mix is not indicated.      DRVVT Screen is normal.      Thrombin time is normal.      NEGATIVE TEST; A LUPUS ANTICOAGULANT WAS NOT DETECTED IN THIS      SPECIMEN WITHIN THE LIMITS OF THE TESTING REPERTOIRE.      If the clinical picture is strongly suggestive of an antiphospholipid      syndrome, recommend anticardiolipin and beta-2-glycoprotein (IgG and      IgM) antibody tests.      Leela Franks M.D.  [phone]      5/2/2013            INR =  0.93 N = 0.86-1.14  (No additional charge)      TT = 15.7 N = 13.0-19.0 sec  (No additional charge)             APTT'S:    Seconds      Reagent =  Stago LA      Normal  =  38      Patient  =  34      1:2 Mix  =  N/A      Reference =  31-43             DILUTE MADELINE VIPER VENOM TEST:      DRVVT Screen Ratio = 0.76 Normal = <1.21         IMMUNOGLOBULIN G SUBCLASSES       Component Value Range    IGG 1030  695 - 1620 mg/dL    IgG1 488  300 - 856 mg/dL    IgG2 325  158 - 761 mg/dL    IgG3 47  24 - 192 mg/dL    IgG4 18  11 - 86 mg/dL   SUNNY ANTIBODY PANEL       Component Value Range    Ribonucleic Protein IgG Antibody 0      Smith Antibody IgG 1      SSA (RO) Antibody IgG 4      SSB (LA) Antibody IgG 0      Scleroderma Antibody IgG 0     BETA 2 GLYCOPROTEIN ANTIBODIES IGG IGM       Component Value Range    Beta-2-Glycopro IgG 1      Beta-2-Glycopro IgM 5     CYCLIC CIT PEPT IGG       Component Value Range    Cyclic Cit Pept IgG/IgA    <20 UNITS    Value: <20      Interpretation:  Negative   DNA DOUBLE STRANDED ANTIBODIES       Component Value Range    DNA-ds    0 - 29 IU/mL    Value: <15      Interpretation:  Negative       CT CHEST PULMONARY EMBOLISM W CONTRAST 5/20/2015 4:57 PM  HISTORY: Pain. SOB. Elevated d-dimer.   TECHNIQUE: 65 mL Isovue 370. Axial images with coronal  reconstructions.  COMPARISON: None.  FINDINGS: Calcified granuloma with surrounding scarring in the  posterolateral left upper lobe. Triangular shaped opacity at the right  lung base in the lateral right middle lobe could represent some  scarring, atelectasis or infiltrate. There is also some scarring or  atelectasis in the posteromedial right lung base. Lungs otherwise  clear.  The pulmonary arteries are well opacified. No CT evidence for acute  pulmonary embolus. No aortic dissection.  Diffuse fatty infiltration of the liver.  IMPRESSION  IMPRESSION:   1. No pulmonary embolus identified.  2. Small focus of atelectasis, infiltrate or scarring in the lateral  right middle lobe.  3. Old granulomatous disease.  4. Otherwise negative.  SILVERIO VAZQUEZ  "MD Nava Report      Patient Name: FAVIO MARTINEZ   MR#: [id number]   Specimen #: [id number]   Collected: 3/15/2016   Received: 3/15/2016   Reported: 3/17/2016 13:33   Ordering Phy(s): PARVEEN ENRIQUEZ     ORIGINAL REPORT FOLLOWS ADDENDUM  ADDENDUM     TO ORIGINAL REPORT   Status: Signed Out   Date Ordered:3/18/2016   Date Complete:3/18/2016   Date Reported:3/18/2016 12:06   Signed Out By: Marianne Godfrey MD     INTERPRETATION:   This addendum is done to incorporate the results of fungal (GMS) stains   done on the specimen.  Specimen is negative for fungal organisms.  The   original final diagnosis remains unchanged.     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  Negative for malignancy(Please   see microscopic description)     Electronically signed out by:     Marianne Godfrey MD     CLINICAL HISTORY:   right orbital mass     GROSS:   The specimen is received labeled \"right orbital biopsy\" and consists of   red-tan nodule measuring 1.5 x 0.9 x 0.6 cm with one smooth side and   opposite rough side consistent with periosteum.  The specimen is   bisected revealing homogenous pale tan fleshy cut surface.  Touch   preparations are prepared, air dried and fixed, portion of specimen is   submitted in RPMI for possible lymphoma workup [facility],   ([facility], [place], [place] ).  The remainder is entirely submitted.   (Dictated by: Marianne Godfrey MD 3/15/2016 04:45 PM)     MICROSCOPIC:     Specimen consists of portion of lacrimal gland with acute and chronic   inflammation.  Focal area of microabscess formation associated with   small areas of necrosis are also present.  Inflammation is seen   extending to the periorbital adipose tissue forming panniculitis.   Specimen is negative for " malignancy.  Samples sent for immunophenotyping   to [facility], ([facility], [place], [place] ) reveals no evidence   of monoclonality or aberrant antigen expression.  A GMS (fungal) stain   is pending and results will be reported as an addendum.     CPT Codes:   A: 60056-EQ9, 17995-LQPQT, SOH, 51291-VOAQN, 96008-INUZ     TESTING LAB LOCATION:   [address]  [address] [phone]     COLLECTION SITE:   [facility]   [facility]            Component      Latest Ref Rng 4/29/2016   Nucleated RBCs      0 /100 0   Absolute Neutrophil      1.6 - 8.3 10e9/L 8.9 (H)   Absolute Lymphocytes      0.8 - 5.3 10e9/L 3.2   Absolute Monocytes      0.0 - 1.3 10e9/L 0.8   Absolute Eosinophils      0.0 - 0.7 10e9/L 0.2   Absolute Basophils      0.0 - 0.2 10e9/L 0.1   Abs Immature Granulocytes      0 - 0.4 10e9/L 0.1   Absolute Nucleated RBC       0.0   IGG      695 - 1620 mg/dL 836   IgG1      300 - 856 mg/dL 280 (L)   IgG2      158 - 761 mg/dL 277   IgG3      24 - 192 mg/dL 35   IgG4      11 - 86 mg/dL 16   RNP Antibody IgG      0.0 - 0.9 AI <0.2 . . .   Smith SUNNY Antibody IgG      0.0 - 0.9 AI <0.2 . . .   SSA (Ro) (SUNNY) Antibody, IgG      0.0 - 0.9 AI <0.2 . . .   SSB (La) (SUNNY) Antibody, IgG      0.0 - 0.9 AI <0.2 . . .   Scleroderma Antibody Scl-70 SUNNY IgG      0.0 - 0.9 AI <0.2 . . .   Cholesterol      <200 mg/dL 154   Triglycerides      <150 mg/dL 220 (H)   HDL Cholesterol      >49 mg/dL 42 (L)   LDL Cholesterol Calculated      <100 mg/dL 67   Non HDL Cholesterol      <130 mg/dL 111   M Tuberculosis Result      NEG Negative   M Tuberculosis Antigen Value       0.00   Albumin      3.4 - 5.0 g/dL 4.3   ALT      0 - 50 U/L 30   AST      0 - 45 U/L 10   Complement C3      76 - 169 mg/dL 157   Complement C4      15 - 50 mg/dL 32   CRP Inflammation      0.0 - 8.0 mg/L <2.9   Sed Rate      0 - 20 mm/h 2   DNA-ds      <10 IU/mL 1   Cyclic  Citrullinated Peptide Antibody, IgG      <7 U/mL 1   Rheumatoid Factor      <20 IU/mL <20   Proteinase 3 Antibody IgG      0.0 - 0.9 AI <0.2 . . .   Myeloperoxidase Antibody IgG      0.0 - 0.9 AI 2.5 (H)   Vitamin D Deficiency screening      20 - 75 ug/L 24   Hemoglobin A1C      4.3 - 6.0 % 7.9 (H)   ALLI by IFA IgG       1:40 (A) . . .     Component      Latest Ref Rng & Units 1/16/2021   WBC      4.0 - 11.0 10e9/L    RBC Count      3.8 - 5.2 10e12/L    Hemoglobin      11.7 - 15.7 g/dL    Hematocrit      35.0 - 47.0 %    MCV      78 - 100 fl    MCH      26.5 - 33.0 pg    MCHC      31.5 - 36.5 g/dL    RDW      10.0 - 15.0 %    Platelet Count      150 - 450 10e9/L    Diff Method          % Neutrophils      %    % Lymphocytes      %    % Monocytes      %    % Eosinophils      %    % Basophils      %    % Immature Granulocytes      %    Nucleated RBCs      0 /100    Absolute Neutrophil      1.6 - 8.3 10e9/L    Absolute Lymphocytes      0.8 - 5.3 10e9/L    Absolute Monocytes      0.0 - 1.3 10e9/L    Absolute Eosinophils      0.0 - 0.7 10e9/L    Absolute Basophils      0.0 - 0.2 10e9/L    Abs Immature Granulocytes      0 - 0.4 10e9/L    Absolute Nucleated RBC          IGG      610 - 1,616 mg/dL    IGA      84 - 499 mg/dL    IGM      35 - 242 mg/dL    Creatinine      0.52 - 1.04 mg/dL    GFR Estimate      >60 mL/min/1.73:m2    GFR Estimate If Black      >60 mL/min/1.73:m2    COVID-19 Virus PCR to [facility] - Source       Nasopharyngeal   COVID-19 Virus PCR to [facility] - Result       Not Detected   Neutrophil Cytoplasmic Antibody      <1:10 titer    Neutrophil Cytoplasmic Antibody Pattern          CD19 B Cells      6 - 27 %    Absolute CD19      107 - 698 cells/uL    Myeloperoxidase Antibody IgG      0.0 - 0.9 AI    Proteinase 3 Antibody IgG      0.0 - 0.9 AI    Vitamin D Deficiency screening      20 - 75 ug/L    Sed Rate      0 - 30 mm/h    CRP Inflammation      0.0 - 8.0 mg/L    Albumin      3.4 - 5.0 g/dL    ALT      0 - 50  U/L    AST      0 - 45 U/L      Component      Latest Ref Rng & Units 5/7/2021   WBC      4.0 - 11.0 10e9/L 8.9   RBC Count      3.8 - 5.2 10e12/L 4.89   Hemoglobin      11.7 - 15.7 g/dL 12.7   Hematocrit      35.0 - 47.0 % 39.7   MCV      78 - 100 fl 81   MCH      26.5 - 33.0 pg 26.0 (L)   MCHC      31.5 - 36.5 g/dL 32.0   RDW      10.0 - 15.0 % 13.7   Platelet Count      150 - 450 10e9/L 336   Diff Method       Automated Method   % Neutrophils      % 65.3   % Lymphocytes      % 20.7   % Monocytes      % 7.8   % Eosinophils      % 5.3   % Basophils      % 0.7   % Immature Granulocytes      % 0.2   Nucleated RBCs      0 /100 0   Absolute Neutrophil      1.6 - 8.3 10e9/L 5.8   Absolute Lymphocytes      0.8 - 5.3 10e9/L 1.8   Absolute Monocytes      0.0 - 1.3 10e9/L 0.7   Absolute Eosinophils      0.0 - 0.7 10e9/L 0.5   Absolute Basophils      0.0 - 0.2 10e9/L 0.1   Abs Immature Granulocytes      0 - 0.4 10e9/L 0.0   Absolute Nucleated RBC       0.0   IGG      610 - 1,616 mg/dL 649   IGA      84 - 499 mg/dL 199   IGM      35 - 242 mg/dL 36   Creatinine      0.52 - 1.04 mg/dL 0.96   GFR Estimate      >60 mL/min/1.73:m2 66   GFR Estimate If Black      >60 mL/min/1.73:m2 77   COVID-19 Virus PCR to [facility] - Source          COVID-19 Virus PCR to [facility] - Result          Neutrophil Cytoplasmic Antibody      <1:10 titer <1:10   Neutrophil Cytoplasmic Antibody Pattern       The ANCA IFA is <1:10.  No further testing will be performed.   CD19 B Cells      6 - 27 % <1 (L)   Absolute CD19      107 - 698 cells/uL <1 (L)   Myeloperoxidase Antibody IgG      0.0 - 0.9 AI 1.1 (H)   Proteinase 3 Antibody IgG      0.0 - 0.9 AI <0.2   Vitamin D Deficiency screening      20 - 75 ug/L 41   Sed Rate      0 - 30 mm/h 9   CRP Inflammation      0.0 - 8.0 mg/L <2.9   Albumin      3.4 - 5.0 g/dL 4.1   ALT      0 - 50 U/L 28   AST      0 - 45 U/L 18     ROS: A comprehensive ROS was done. Positives are per HPI.          HISTORY REVIEW:  Past  Medical History:   Diagnosis Date     Abnormal glandular Papanicolaou smear of cervix      Allergic rhinitis     Allergy, airborne subst     Arthritis      ASCVD (arteriosclerotic cardiovascular disease)      Chronic pain      Chronic pancreatitis (H)     idiopathic, Tx: PPI, antioxidants, pancreatic enzymes     Common migraine      Coronary artery disease      Costochondritis      Difficulty in walking(719.7)      Dyspnea on exertion      Ectasia, mammary duct     [facility], persistent nipple discharge     Elevated fasting glucose      Gastro-oesophageal reflux disease      Hernia      History of angina      Hyperlipidemia LDL goal < 100      Hypertension goal BP (blood pressure) < 140/90     Essential hypertension     Iron deficiency anemia      Ischemic cardiomyopathy      Menorrhagia      Migraine headaches      Mild persistent asthma      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 Matias, neg w/u, Rx'd w/prednisone     Ventral hernia, unspecified, without mention of obstruction or gangrene 2012     Past Surgical History:   Procedure Laterality Date     C ECHO HEART XTHORACIC,COMPLETE, W/O DOPPLER  04    [facility] WNL, EF 60%     C/SECTION, LOW TRANSVERSE           CARDIAC SURGERY      cardiac stent- recent in 16; 4  other stents     DILATION AND CURETTAGE N/A 2016    Procedure: DILATION AND CURETTAGE;  Surgeon: Nahed Butler MD;  Location: [facility] OR     HC UGI ENDOSCOPY W EUS  08    Dr. Pastrana, possible chronic pancreatitis, fatty liver     HERNIA REPAIR  2012    done at [facility]     INSERT INTRAUTERINE DEVICE N/A 2016    Procedure: INSERT INTRAUTERINE DEVICE;  Surgeon: Nahed Butler MD;  Location: [facility] OR     INT UTERINE FIBRIOD EMBOLIZATION  10/29/2014     LAPAROSCOPIC CHOLECYSTECTOMY  08    Dr. Arnol GRUBBS TX, CERVICAL      s/p LEEP     ORBITOTOMY Right 3/15/2016    Procedure: ORBITOTOMY;  Surgeon: Myron Cyr MD;  Location:  [facility]     ORBITOTOMY Right 2017    Procedure: ORBITOTOMY;  RIGHT ORBITOTOMY AND BIOPSY;  Surgeon: Charis Holbrook MD;  Location: [facility]     REPAIR PTOSIS Right 2017    Procedure: REPAIR PTOSIS;  RIGHT UPPER LID PTOSIS REPAIR;  Surgeon: Myron Cyr MD;  Location: [facility]     UPPER GI ENDOSCOPY  10/21/08    mild gastritis, Dr. Hidalgo     Family History   Problem Relation Age of Onset     Heart Disease Father 50        heart attack     Cerebrovascular Disease Father      Diabetes Father      Hypertension Father      Depression Father      C.A.D. Father      Hypertension Mother      Arthritis Mother      Heart Disease Mother         long qt syndrome     Thyroid Disease Mother      C.A.D. Mother      Heart Disease Brother 15        MI at 15, 16.      Diabetes Maternal Uncle      Hypertension Maternal Aunt      Hypertension Maternal Uncle      Arthritis Brother         he passed away, had severe arthritis at age 11     Arthritis Maternal Uncle      Eye Disorder Maternal Uncle         cataracts     Neurologic Disorder Sister         migraines     Neurologic Disorder Sister         migraines     Respiratory Son         asthma     Cerebrovascular Disease Maternal Uncle      C.A.D. Brother      Family History Negative Other         neg for RA, SLE      Unknown/Adopted No family hx of         unknown neurological issues in her family, mother was adopted     Skin Cancer No family hx of         No known family hx of skin cancer     Social History     Socioeconomic History     Marital status: Single     Spouse name: Not on file     Number of children: 1     Years of education: Not on file     Highest education level: Not on file   Occupational History     Employer: NONE    Social Needs     Financial resource strain: Not on file     Food insecurity     Worry: Not on file     Inability: Not on file     Transportation needs     Medical: Not on file     Non-medical: Not on file   Tobacco Use     Smoking status: Current Every Day Smoker     Packs/day: 0.20     Years: 1.00     Pack years: 0.20     Types: Cigarettes     Last attempt to quit: 2016     Years since quittin.2     Smokeless tobacco: Never Used   Substance and Sexual Activity     Alcohol use: No     Alcohol/week: 0.0 standard drinks     Drug use: No     Sexual activity: Not Currently   Lifestyle     Physical activity     Days per week: Not on file     Minutes per session: Not on file     Stress: Not on file   Relationships     Social connections     Talks on phone: Not on file     Gets together: Not on file     Attends [employer] service: Not on file     Active member of club or organization: Not on file     Attends meetings of clubs or organizations: Not on file     Relationship status: Not on file     Intimate partner violence     Fear of current or ex partner: Not on file     Emotionally abused: Not on file     Physically abused: Not on file     Forced sexual activity: Not on file   Other Topics Concern     Parent/sibling w/ CABG, MI or angioplasty before 65F 55M? Yes   Social History Narrative    Balanced Diet - sometimes    Osteoporosis Prevention Measures - Dairy servings per day: 2 servings weekly    Regular Exercise -  Yes Describe walking 4 times a week    Dental Exam - NO    Seatbelts used - Yes     Self Breast Exam - Yes    Abuse: Current or Past (Physical, Sexual or Emotional)- No    Do you have any concerns about STD's -  No    Do you feel safe in your environment - No    Guns stored in the home - No    Sunscreen used - Yes    Lipids -  YES - Date: 053102    Glucose -  YES - Date: 012804    Eye Exam - YES - Date: one year ago    Colon Cancer Screening - No    Hemoccults - NO    Pap Test -  YES - Date: 070904, remote history of LEEP    Mammography - YES - Date: last spring, would like to discuss, needs [facility]    DEXA - NO    Immunizations reviewed and up to date - Yes, last td given in 1997 or 1998     Patient Active Problem List   Diagnosis     Seasonal affective disorder (H)     Allergic rhinitis     PCOS (polycystic ovarian syndrome)     Moderate persistent asthma     Chronic pancreatitis (H)     Hypertension goal BP (blood pressure) < 140/90     Common migraine     NSTEMI (non-ST elevated myocardial infarction) (H)     Hyperlipidemia LDL goal <70     ASCVD (arteriosclerotic cardiovascular disease)     GERD (gastroesophageal reflux disease)     Ischemic cardiomyopathy     Hypertensive heart disease     Uterine leiomyoma     Iron deficiency anemia     Costochondritis     Vitamin D deficiency     Breast cancer screening     Spinal stenosis in cervical region     Fibromyalgia     Seronegative rheumatoid arthritis (H)     Type 2 diabetes, HbA1C goal < 8% (H)     Type 2 diabetes mellitus with other specified complication (H)     Hyperlipidemia associated with type 2 diabetes mellitus (H)     Hypertension associated with diabetes (H)     Overweight with body mass index (BMI) 25.0-29.9     Tobacco use disorder     Telogen effluvium     CAD S/P percutaneous coronary angioplasty     Status post coronary angiogram     ANCA-associated vasculitis (H)     Wegener's vasculitis (H)     Type 1 diabetes mellitus with complications (H)     Chest discomfort       Pregnancy Hx: She is  . All misscarriages were in first trimester. H/o OC use in the past. 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    PMHx, FHx, SHx were reviewed, unchanged.      Outpatient Encounter Medications as of 5/10/2021   Medication Sig Dispense Refill     acetaminophen (TYLENOL) 325 MG tablet Take 1-2 tablets (325-650 mg) by mouth every 6 hours as needed for pain (headache) 250 tablet 4     albuterol (2.5 MG/3ML) 0.083% neb solution INL 1 VIAL VIA NEBULIZATION Q 4 TO 6 HOURS PRN  1     albuterol (PROAIR HFA, PROVENTIL HFA, VENTOLIN HFA) 108 (90 BASE) MCG/ACT inhaler Inhale 2 puffs into the lungs every 6 hours as needed for shortness of breath / dyspnea or wheezing 3 Inhaler 1     ASPIRIN LOW DOSE 81 MG EC tablet TAKE 1 TABLET(81 MG) BY MOUTH DAILY 30 tablet 11     blood glucose monitoring (NO BRAND SPECIFIED) meter device kit Use to test blood sugar 4 X times daily or as directed. (Patient taking differently: 1 kit Use to test blood sugar 4 X times daily or as directed.) 1 kit 0     blood glucose monitoring (NO BRAND SPECIFIED) test strip 1 strip by In Vitro route 4 times daily Test as directed. Please dispense three months and three months of refills. Thank you. (Patient taking differently: 1 strip by In Vitro route 4 times daily Test as directed. Please dispense three months and three months of refills. Thank you.) 360 each 3     Blood Pressure Monitor KIT 1 each daily Monitor home blood pressure as instructed by physician.  Dispense [place] blood pressure kit. 1 kit 0     calcium carbonate (TUMS) 500 MG chewable tablet Take 3-4 chew tab by mouth daily as needed.       clopidogrel (PLAVIX) 75 MG tablet Take 1 tablet (75 mg) by mouth daily 30 tablet 11     COMPRESSION STOCKINGS 2 each daily Apply one 10-15 mmHg compression stocking to each lower extgmierty during the day and remove before bedtime. 4 each 2     cyclobenzaprine (FLEXERIL) 10 MG tablet Take 1 tablet (10 mg) by mouth 2 times daily as needed  for muscle spasms 60 tablet 3     cycloSPORINE (RESTASIS) 0.05 % ophthalmic emulsion Place 1 drop into both eyes 2 times daily 60 each 11     cycloSPORINE (RESTASIS) 0.05 % ophthalmic emulsion Place 1 drop into the right eye every 12 hours       desonide (DESOWEN) 0.05 % cream Apply topically 2 times daily       diclofenac (VOLTAREN) 1 % topical gel Apply 2 g topically 4 times daily Apply to affected joints 100 g 3     dicyclomine (BENTYL) 20 MG tablet TAKE 1 TABLET(20 MG) BY MOUTH FOUR TIMES DAILY AS NEEDED. Pls schedule clinic appt for refills. 60 tablet 0     diphenhydrAMINE (BENADRYL) 25 MG capsule Take 1 capsule (25 mg) by mouth nightly as needed for itching or allergies 30 capsule 2     EPIPEN 2-RIKY 0.3 MG/0.3ML injection INJECT 0.3 MG INTO THE MUSCLE PRF ANAPHYALAXIS  0     ferrous gluconate (FERGON) 324 (38 Fe) MG tablet Take 1 tablet (324 mg) by mouth 2 times daily (with meals) DO NOT CRUSH. Absorbed best on an empty stomach. If stomach upset occurs, can take with meals. For additional refills, please schedule a follow-up appointment with Dr Solano at [phone] 180 tablet 0     fexofenadine (ALLEGRA) 180 MG tablet Take 1 tablet by mouth daily as needed. 90 tablet 3     fluocinolone (SYNALAR) 0.01 % solution Apply topically daily as needed       fluocinonide (LIDEX) 0.05 % external ointment Apply topically as needed       fluticasone-vilanterol (BREO ELLIPTA) 100-25 MCG/INH inhaler Inhale 1 puff into the lungs daily       folic acid (FOLVITE) 1 MG tablet Take 1 tablet by mouth daily 90 tablet 3     hydroxychloroquine (PLAQUENIL) 200 MG tablet Take 2 tablets (400 mg) by mouth daily (Annual eye exam/plaquenil screening) 180 tablet 0     insulin pen needle (BD MARCK U/F) 32G X 4 MM USE DAILY OR AS DIRECTED 300 each 3     ketoconazole (NIZORAL) 2 % shampoo Apply topically daily as needed       Ketoprofen POWD 1 g 2 times daily as needed (prn pain) 500 g 3     lidocaine (LMX4) 4 % external cream Apply  topically once as needed for mild pain (prn pain) 30 g 3     lifitegrast (XIIDRA) 5 % opthalmic solution Place 1 drop into both eyes 2 times daily 20 each 3     lisinopril-hydrochlorothiazide (ZESTORETIC) 20-25 MG tablet Take 1 tablet by mouth daily 30 tablet 6     Magnesium Oxide -Mg Supplement 250 MG TABS TK 1 T PO BID. REDUCE IF STOOLS LOOSEN  11     metFORMIN (GLUCOPHAGE-XR) 500 MG 24 hr tablet TAKE 2 TABLETS(1000 MG) BY MOUTH DAILY WITH DINNER (Patient taking differently: Take 2,000 mg by mouth daily ) 180 tablet 0     metoclopramide (REGLAN) 10 MG tablet Take 1 tablet (10 mg) by mouth 4 times daily (before meals and nightly) 90 tablet 0     metoprolol succinate ER (TOPROL-XL) 100 MG 24 hr tablet Take 1 tablet (100 mg) by mouth daily 30 tablet 11     metroNIDAZOLE (NORITATE) 1 % cream Apply topically daily       montelukast (SINGULAIR) 10 MG tablet Take 1 tablet (10 mg) by mouth At Bedtime 30 tablet 1     Multiple Vitamin (DAILY-GERALD) TABS Take 1 tablet by mouth daily  0     nitroGLYcerin (NITROSTAT) 0.4 MG sublingual tablet Place 1 tablet (0.4 mg) under the tongue every 5 minutes as needed for chest pain . After 3 doses, if pain persists call 911. 25 tablet 3     nystatin (MYCOSTATIN) 366361 UNITS TABS tablet TK 2 TS PO BID  0     ondansetron (ZOFRAN-ODT) 8 MG ODT tab Take 1 tablet (8 mg) by mouth every 8 hours as needed for nausea 60 tablet 1     pantoprazole (PROTONIX) 40 MG EC tablet Take 1 tablet (40 mg) by mouth daily Pork free tablets. (Patient taking differently: Take 40 mg by mouth as needed Pork free tablets.) 30 tablet 1     pravastatin (PRAVACHOL) 40 MG tablet Take 1 tablet (40 mg) by mouth daily 30 tablet 11     ranitidine (ZANTAC) 150 MG tablet Take 1 tablet (150 mg) by mouth 2 times daily 180 tablet 1     sennosides (SENOKOT) 8.6 MG tablet 1-2 tabs a day as needed for constipation 60 tablet 1     spironolactone (ALDACTONE) 25 MG tablet Take 1 tablet (25 mg) by mouth daily TAKE 1 TABLET BY MOUTH  EVERY DAY TAKE ADDITIONAL 1/2 TABLET BY MOUTH EVERY DAY AS NEEDED FOR WEIGHT GAIN 30 tablet 11     sucralfate (CARAFATE) 1 GM tablet Take 1 tablet (1 g) by mouth 4 times daily 360 tablet 0     traMADol (ULTRAM) 50 MG tablet Take 1 tablet (50 mg) by mouth every 8 hours as needed for moderate pain 60 tablet 3     Triamcinolone Acetonide (NASACORT ALLERGY 24HR NA)        vitamin D2 (ERGOCALCIFEROL) 55945 units (1250 mcg) capsule Take 1 capsule (50,000 Units) by mouth every 7 days 12 capsule 0     vitamin D3 (CHOLECALCIFEROL) 2000 units (50 mcg) tablet Take 1 tablet (2,000 Units) by mouth daily 90 tablet 1     No facility-administered encounter medications on file as of 5/10/2021.        Allergies   Allergen Reactions     Amoxicillin-Pot Clavulanate      Augmentin      Unknown possible hives and edema     Azithromycin      Diatrizoate Other (See Comments)     Pt wants this listed because she is allergic to shellfish      Imitrex [Sumatriptan]      Severe face/neck/chest tightness and flushing side effects      Penicillins Hives     Unknown      Pork Allergy      Stomach pain, cramping, diarrhea, itching, nausea and headaches     Shellfish Allergy Hives and Swelling     Sulfa Drugs Hives and Swelling     Zithromax [Azithromycin Dihydrate] Swelling     Unknown          Ph.E:    Constitutional: WD/WN. Pleasant. In no acute distress.   Eyes: Swelling around R eye significantly improved, not clear swelling today. EOMI.   HEENT: No oral ulcers or thrush. Normal salivary pool.  Lymph: No cervical, supraclavicular LAD.  Chest: Clear to auscultation bilaterally  CV: RRR, no murmurs/ rubs or gallops. No edema, clubbing or cyanosis.   GI: Abdomen is soft and non tender.  MS: No synovitis or joint tenderness. Cool joints.    Skin: No rashes or lesions noted. No malar rash.  Neuro: A&O x 3. Grossly non focal, muscular power 5/5 in all ext  Psych: NL affect and mood    Assessment/ plan:    1-Seropositive non-erosive RA (arthritis, AM  stiffness, high CRP, RF 26 but re-check neg 3/2015 on HCQ) Dx 5/2013, FMS, new pleuritic CP 3/20-15-5/2015 resolved on steroids. She is on  mg bid since 5/2014. She tolerates it well and it's helping some but not enough to control all her pain. Continues having flare ups of joint pain, could be GPA related. Some pain is due to FMS.    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 Prednisone was not a good option for her given weight gain, diabetes. She tried and did not tolerate MTX, AZA.    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 Repeat MPO was positive in 6/2017.    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 Shah and Dr. Valdez at [place]. 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     She is on rituximab since 12/12/2018 with great response, minor allergic reaction which could be managed by pre-meds and extra steroid dose.      Stable GPA but rituximab does not last 6 months, will give it every 5 months. According to ACR guideline could give every 4-6 months. Stable labs. Improved eye swelling but like to get follow up orbit CT.    Had last rituximab 1 gram on 1/19, 2/2/2021.      Today's plan:     mg bid with annual eye exam    Follow up with Dr. Saulo lópez for joint pain    Rituximab 1 gram at the end of this month    Please schedule CT of the orbit    Eye exam    Keep 8/20/2021 appointment      My impression is that her arthralgias are a combination of RA/ IA sec GPA, fibromyalgia and chronic pain.  Stopped HCQ in the past shortly after resumed taking it as arthralgia recurred.     2-Fad pads. She was seen by endocrinology and cushing was ruled out in 4/2014. Was advised to do f/u for enlarged thyroid/thyroid nodules.    3-Hair loss. F/U with dermatology    4-Chronic pain. F/U  with pain clinic    5-Concerns of subclinical hyperthyroidism: TSH is barely minimal, chart review shows that those lowest levels are since April 2014. At previous visits, discussed Endocrinology ref which pt wants to hold off in this visit.     6-Vit D deficiency. Was replaced with vit D 50, 000 units po qwk x 12 wk then 2000 units every day    7-Upper extremity swelling. Recent mammogram without suggestion of malignancy. No LAD of axillary region. Arms appear normal on physical exam, however patient reports intermittent swelling.  - Referral to lymphedema clinic was done in the past.            Majo Mckinnon MD      Orders Placed This Encounter   Procedures     CT Orbits wo Contrast

## 2021-05-10 NOTE — NURSING NOTE
Chief Complaint   Patient presents with     RECHECK     follow up         BP (!) 158/98 (BP Location: Left arm, Patient Position: Sitting, Cuff Size: Adult Regular)   Pulse 87   Wt 77.1 kg (169 lb 14.4 oz)   SpO2 99%   BMI 30.10 kg/m        Brian Pierce, EMT

## 2021-05-11 LAB
ANCA AB PATTERN SER IF-IMP: NORMAL
C-ANCA TITR SER IF: NORMAL {TITER}
IGA SERPL-MCNC: 199 MG/DL (ref 84–499)
IGG SERPL-MCNC: 649 MG/DL (ref 610–1616)
IGM SERPL-MCNC: 36 MG/DL (ref 35–242)

## 2021-05-11 RX ORDER — SENNOSIDES 8.6 MG
TABLET ORAL
Qty: 60 TABLET | Refills: 1 | Status: SHIPPED | OUTPATIENT
Start: 2021-05-11

## 2021-05-11 RX ORDER — ACETAMINOPHEN 325 MG/1
650 TABLET ORAL ONCE
Status: CANCELLED
Start: 2021-05-11

## 2021-05-11 RX ORDER — METHYLPREDNISOLONE SODIUM SUCCINATE 125 MG/2ML
125 INJECTION, POWDER, LYOPHILIZED, FOR SOLUTION INTRAMUSCULAR; INTRAVENOUS ONCE
Status: CANCELLED
Start: 2021-05-11

## 2021-05-11 NOTE — TELEPHONE ENCOUNTER
sennosides (SENOKOT) 8.6 MG tablet  Last Written Prescription Date:  2/2/21  Last Fill Quantity: 60   # refills: 1  Last Office Visit : 5/10/21  Future Office visit:  8/20/21    Routing refill request to provider for review/approval because: not on protocol

## 2021-05-13 NOTE — TELEPHONE ENCOUNTER
Pt is scheduled for Rituximab on 6/4 at 8:30 am.  Unable to get pt in any sooner. Left message requesting return call.    Desiree Godinez RN  [facility]

## 2021-05-18 RX ORDER — METFORMIN HCL 500 MG
2000 TABLET, EXTENDED RELEASE 24 HR ORAL
COMMUNITY
End: 2022-03-03

## 2021-05-18 NOTE — TELEPHONE ENCOUNTER
Med list reconciled per protocol.    Pt updated with a voicemail.    Desiree Godinez RN  [facility]

## 2021-05-18 NOTE — TELEPHONE ENCOUNTER
Called and spoke with pt, she is going to reschedule her appt, she would like to push is out further.    Pt is frustrated as her med list is not cleaned up, she is not taking several of her meds, asked for them to be removed and they were not removed from her med list.    Will see if this is something we are able to do.    Desiree Godinez RN  [facility]ology [facility]

## 2021-05-21 ENCOUNTER — TELEPHONE (OUTPATIENT)
Dept: FAMILY MEDICINE | Facility: CLINIC | Age: 56
End: 2021-05-21

## 2021-05-21 ENCOUNTER — TELEPHONE (OUTPATIENT)
Dept: RHEUMATOLOGY | Facility: CLINIC | Age: 56
End: 2021-05-21

## 2021-05-21 NOTE — TELEPHONE ENCOUNTER
Marianne Donnelly  You 43 minutes ago (12:43 PM)     No. After reviewing her chart I just changed the appointment to arrive instead of no-show.  I also went into the letters and void the no-show letter.    Message text      Called and left patient a message of the above information.     Beulah Fortune, [facility]   5/21/2021 1:28 PM

## 2021-05-21 NOTE — TELEPHONE ENCOUNTER
[facility] Call Center    Phone Message    May a detailed message be left on voicemail: yes     Reason for Call: Other: Pt received letter in the mail   Pt is calling stating she received a letter stating she miss her most recent appointment with Rheumatology.  Pt states she did not miss any appointment and that she did see Dr. Mckinnon at her last appt on 5/10/21.      Pt states she wants to make sure this is not shown up on her record that she missed an appt with Dr. Mckinnon.      Action Taken: Message routed to:  [facility] adult [facility]ology

## 2021-05-21 NOTE — TELEPHONE ENCOUNTER
[facility] Call Center    Phone Message    May a detailed message be left on voicemail: yes     Reason for Call: Other: .  Patient is asking for permission for a virtual appointment for stomach pains. She would not like to come into clinic unless she Absolutely has to.    Action Taken: Message routed to:  [facility]    Travel Screening: Not Applicable

## 2021-05-24 ENCOUNTER — APPOINTMENT (OUTPATIENT)
Dept: ULTRASOUND IMAGING | Facility: CLINIC | Age: 56
End: 2021-05-24
Attending: EMERGENCY MEDICINE
Payer: COMMERCIAL

## 2021-05-24 ENCOUNTER — HOSPITAL ENCOUNTER (INPATIENT)
Facility: CLINIC | Age: 56
LOS: 3 days | Discharge: HOME | End: 2021-05-28
Attending: EMERGENCY MEDICINE | Admitting: INTERNAL MEDICINE
Payer: COMMERCIAL

## 2021-05-24 DIAGNOSIS — K85.90 ACUTE PANCREATITIS, UNSPECIFIED COMPLICATION STATUS, UNSPECIFIED PANCREATITIS TYPE: ICD-10-CM

## 2021-05-24 DIAGNOSIS — R35.0 FREQUENCY OF URINATION: ICD-10-CM

## 2021-05-24 DIAGNOSIS — Z20.822 CONTACT WITH AND (SUSPECTED) EXPOSURE TO COVID-19: ICD-10-CM

## 2021-05-24 DIAGNOSIS — D72.829 LEUKOCYTOSIS, UNSPECIFIED TYPE: ICD-10-CM

## 2021-05-24 DIAGNOSIS — E87.6 HYPOKALEMIA: ICD-10-CM

## 2021-05-24 DIAGNOSIS — R10.13 ABDOMINAL PAIN, EPIGASTRIC: ICD-10-CM

## 2021-05-24 DIAGNOSIS — R35.0 URINARY FREQUENCY: ICD-10-CM

## 2021-05-24 LAB
ALBUMIN SERPL-MCNC: 4.3 G/DL (ref 3.4–5)
ALBUMIN UR-MCNC: NEGATIVE MG/DL
ALP SERPL-CCNC: 104 U/L (ref 40–150)
ALT SERPL W P-5'-P-CCNC: 30 U/L (ref 0–50)
ANION GAP SERPL CALCULATED.3IONS-SCNC: 10 MMOL/L (ref 3–14)
APPEARANCE UR: CLEAR
AST SERPL W P-5'-P-CCNC: 15 U/L (ref 0–45)
BASOPHILS # BLD AUTO: 0.1 10E9/L (ref 0–0.2)
BASOPHILS NFR BLD AUTO: 0.4 %
BILIRUB SERPL-MCNC: 0.3 MG/DL (ref 0.2–1.3)
BILIRUB UR QL STRIP: NEGATIVE
BUN SERPL-MCNC: 19 MG/DL (ref 7–30)
CALCIUM SERPL-MCNC: 9.9 MG/DL (ref 8.5–10.1)
CHLORIDE SERPL-SCNC: 100 MMOL/L (ref 94–109)
CO2 SERPL-SCNC: 26 MMOL/L (ref 20–32)
COLOR UR AUTO: ABNORMAL
CREAT SERPL-MCNC: 0.92 MG/DL (ref 0.52–1.04)
DIFFERENTIAL METHOD BLD: ABNORMAL
EOSINOPHIL # BLD AUTO: 0.3 10E9/L (ref 0–0.7)
EOSINOPHIL NFR BLD AUTO: 1.9 %
ERYTHROCYTE [DISTWIDTH] IN BLOOD BY AUTOMATED COUNT: 13 % (ref 10–15)
GFR SERPL CREATININE-BSD FRML MDRD: 70 ML/MIN/{1.73_M2}
GLUCOSE BLDC GLUCOMTR-MCNC: 316 MG/DL (ref 70–99)
GLUCOSE SERPL-MCNC: 347 MG/DL (ref 70–99)
GLUCOSE UR STRIP-MCNC: >1000 MG/DL
HCG UR QL: NEGATIVE
HCT VFR BLD AUTO: 41.8 % (ref 35–47)
HGB BLD-MCNC: 13.9 G/DL (ref 11.7–15.7)
HGB UR QL STRIP: NEGATIVE
IMM GRANULOCYTES # BLD: 0 10E9/L (ref 0–0.4)
IMM GRANULOCYTES NFR BLD: 0.2 %
INTERNAL QC OK POCT: YES
KETONES UR STRIP-MCNC: 10 MG/DL
LEUKOCYTE ESTERASE UR QL STRIP: NEGATIVE
LIPASE SERPL-CCNC: 407 U/L (ref 73–393)
LYMPHOCYTES # BLD AUTO: 2 10E9/L (ref 0.8–5.3)
LYMPHOCYTES NFR BLD AUTO: 15 %
MCH RBC QN AUTO: 25.8 PG (ref 26.5–33)
MCHC RBC AUTO-ENTMCNC: 33.3 G/DL (ref 31.5–36.5)
MCV RBC AUTO: 78 FL (ref 78–100)
MONOCYTES # BLD AUTO: 1.1 10E9/L (ref 0–1.3)
MONOCYTES NFR BLD AUTO: 7.9 %
NEUTROPHILS # BLD AUTO: 10 10E9/L (ref 1.6–8.3)
NEUTROPHILS NFR BLD AUTO: 74.6 %
NITRATE UR QL: NEGATIVE
NRBC # BLD AUTO: 0 10*3/UL
NRBC BLD AUTO-RTO: 0 /100
PH UR STRIP: 5.5 PH (ref 5–7)
PLATELET # BLD AUTO: 416 10E9/L (ref 150–450)
POTASSIUM SERPL-SCNC: 3.3 MMOL/L (ref 3.4–5.3)
PROT SERPL-MCNC: 8 G/DL (ref 6.8–8.8)
RBC # BLD AUTO: 5.39 10E12/L (ref 3.8–5.2)
SODIUM SERPL-SCNC: 136 MMOL/L (ref 133–144)
SOURCE: ABNORMAL
SP GR UR STRIP: 1.02 (ref 1–1.03)
TROPONIN I SERPL-MCNC: <0.015 UG/L (ref 0–0.04)
UROBILINOGEN UR STRIP-MCNC: NORMAL MG/DL (ref 0–2)
WBC # BLD AUTO: 13.5 10E9/L (ref 4–11)

## 2021-05-24 PROCEDURE — 999N001017 HC STATISTIC GLUCOSE BY METER IP

## 2021-05-24 PROCEDURE — 81025 URINE PREGNANCY TEST: CPT | Performed by: EMERGENCY MEDICINE

## 2021-05-24 PROCEDURE — 93010 ELECTROCARDIOGRAM REPORT: CPT | Performed by: EMERGENCY MEDICINE

## 2021-05-24 PROCEDURE — 83605 ASSAY OF LACTIC ACID: CPT | Performed by: EMERGENCY MEDICINE

## 2021-05-24 PROCEDURE — 36415 COLL VENOUS BLD VENIPUNCTURE: CPT | Performed by: EMERGENCY MEDICINE

## 2021-05-24 PROCEDURE — 84484 ASSAY OF TROPONIN QUANT: CPT | Performed by: EMERGENCY MEDICINE

## 2021-05-24 PROCEDURE — 83690 ASSAY OF LIPASE: CPT | Performed by: EMERGENCY MEDICINE

## 2021-05-24 PROCEDURE — 76705 ECHO EXAM OF ABDOMEN: CPT

## 2021-05-24 PROCEDURE — 99285 EMERGENCY DEPT VISIT HI MDM: CPT | Mod: 25 | Performed by: EMERGENCY MEDICINE

## 2021-05-24 PROCEDURE — 93005 ELECTROCARDIOGRAM TRACING: CPT | Performed by: EMERGENCY MEDICINE

## 2021-05-24 PROCEDURE — 85025 COMPLETE CBC W/AUTO DIFF WBC: CPT | Performed by: EMERGENCY MEDICINE

## 2021-05-24 PROCEDURE — 81003 URINALYSIS AUTO W/O SCOPE: CPT | Performed by: EMERGENCY MEDICINE

## 2021-05-24 PROCEDURE — C9803 HOPD COVID-19 SPEC COLLECT: HCPCS | Performed by: EMERGENCY MEDICINE

## 2021-05-24 PROCEDURE — 80053 COMPREHEN METABOLIC PANEL: CPT | Performed by: EMERGENCY MEDICINE

## 2021-05-24 RX ORDER — KETOROLAC TROMETHAMINE 15 MG/ML
15 INJECTION, SOLUTION INTRAMUSCULAR; INTRAVENOUS ONCE
Status: COMPLETED | OUTPATIENT
Start: 2021-05-24 | End: 2021-05-25

## 2021-05-24 RX ORDER — POTASSIUM CHLORIDE 750 MG/1
40 TABLET, EXTENDED RELEASE ORAL ONCE
Status: COMPLETED | OUTPATIENT
Start: 2021-05-24 | End: 2021-05-25

## 2021-05-24 RX ORDER — SODIUM CHLORIDE 9 MG/ML
INJECTION, SOLUTION INTRAVENOUS CONTINUOUS
Status: DISCONTINUED | OUTPATIENT
Start: 2021-05-25 | End: 2021-05-25

## 2021-05-24 RX ORDER — ONDANSETRON 2 MG/ML
4 INJECTION INTRAMUSCULAR; INTRAVENOUS EVERY 30 MIN PRN
Status: DISCONTINUED | OUTPATIENT
Start: 2021-05-24 | End: 2021-05-25

## 2021-05-24 ASSESSMENT — MIFFLIN-ST. JEOR: SCORE: 1292.62

## 2021-05-25 ENCOUNTER — APPOINTMENT (OUTPATIENT)
Dept: MRI IMAGING | Facility: CLINIC | Age: 56
End: 2021-05-25
Attending: INTERNAL MEDICINE
Payer: COMMERCIAL

## 2021-05-25 ENCOUNTER — APPOINTMENT (OUTPATIENT)
Dept: CT IMAGING | Facility: CLINIC | Age: 56
End: 2021-05-25
Attending: EMERGENCY MEDICINE
Payer: COMMERCIAL

## 2021-05-25 ENCOUNTER — APPOINTMENT (OUTPATIENT)
Dept: CARDIOLOGY | Facility: CLINIC | Age: 56
End: 2021-05-25
Attending: INTERNAL MEDICINE
Payer: COMMERCIAL

## 2021-05-25 PROBLEM — D72.829 LEUKOCYTOSIS, UNSPECIFIED TYPE: Status: ACTIVE | Noted: 2021-05-25

## 2021-05-25 PROBLEM — E87.6 HYPOKALEMIA: Status: ACTIVE | Noted: 2021-05-25

## 2021-05-25 PROBLEM — R10.13 ABDOMINAL PAIN, EPIGASTRIC: Status: ACTIVE | Noted: 2021-05-25

## 2021-05-25 PROBLEM — R35.0 URINARY FREQUENCY: Status: ACTIVE | Noted: 2021-05-25

## 2021-05-25 PROBLEM — K85.90 ACUTE PANCREATITIS, UNSPECIFIED COMPLICATION STATUS, UNSPECIFIED PANCREATITIS TYPE: Status: ACTIVE | Noted: 2021-05-25

## 2021-05-25 LAB
GLUCOSE BLDC GLUCOMTR-MCNC: 112 MG/DL (ref 70–99)
GLUCOSE BLDC GLUCOMTR-MCNC: 85 MG/DL (ref 70–99)
GLUCOSE BLDC GLUCOMTR-MCNC: 90 MG/DL (ref 70–99)
INTERPRETATION ECG - MUSE: NORMAL
LABORATORY COMMENT REPORT: NORMAL
LACTATE BLD-SCNC: 1.2 MMOL/L (ref 0.7–2)
SARS-COV-2 RNA RESP QL NAA+PROBE: NEGATIVE
SPECIMEN SOURCE: NORMAL

## 2021-05-25 PROCEDURE — 96374 THER/PROPH/DIAG INJ IV PUSH: CPT | Performed by: EMERGENCY MEDICINE

## 2021-05-25 PROCEDURE — 250N000011 HC RX IP 250 OP 636: Performed by: PEDIATRICS

## 2021-05-25 PROCEDURE — 250N000013 HC RX MED GY IP 250 OP 250 PS 637: Performed by: EMERGENCY MEDICINE

## 2021-05-25 PROCEDURE — 96376 TX/PRO/DX INJ SAME DRUG ADON: CPT | Performed by: EMERGENCY MEDICINE

## 2021-05-25 PROCEDURE — 99254 IP/OBS CNSLTJ NEW/EST MOD 60: CPT | Mod: GC | Performed by: INTERNAL MEDICINE

## 2021-05-25 PROCEDURE — 96361 HYDRATE IV INFUSION ADD-ON: CPT | Performed by: EMERGENCY MEDICINE

## 2021-05-25 PROCEDURE — 250N000011 HC RX IP 250 OP 636: Performed by: EMERGENCY MEDICINE

## 2021-05-25 PROCEDURE — 250N000009 HC RX 250: Performed by: INTERNAL MEDICINE

## 2021-05-25 PROCEDURE — 250N000013 HC RX MED GY IP 250 OP 250 PS 637: Performed by: STUDENT IN AN ORGANIZED HEALTH CARE EDUCATION/TRAINING PROGRAM

## 2021-05-25 PROCEDURE — 74183 MRI ABD W/O CNTR FLWD CNTR: CPT | Mod: 26 | Performed by: RADIOLOGY

## 2021-05-25 PROCEDURE — 120N000002 HC R&B MED SURG/OB UMMC

## 2021-05-25 PROCEDURE — 250N000009 HC RX 250: Performed by: EMERGENCY MEDICINE

## 2021-05-25 PROCEDURE — 250N000013 HC RX MED GY IP 250 OP 250 PS 637: Performed by: INTERNAL MEDICINE

## 2021-05-25 PROCEDURE — 96375 TX/PRO/DX INJ NEW DRUG ADDON: CPT | Performed by: EMERGENCY MEDICINE

## 2021-05-25 PROCEDURE — 93306 TTE W/DOPPLER COMPLETE: CPT

## 2021-05-25 PROCEDURE — 258N000003 HC RX IP 258 OP 636

## 2021-05-25 PROCEDURE — 258N000003 HC RX IP 258 OP 636: Performed by: STUDENT IN AN ORGANIZED HEALTH CARE EDUCATION/TRAINING PROGRAM

## 2021-05-25 PROCEDURE — 74183 MRI ABD W/O CNTR FLWD CNTR: CPT

## 2021-05-25 PROCEDURE — A9585 GADOBUTROL INJECTION: HCPCS | Performed by: INTERNAL MEDICINE

## 2021-05-25 PROCEDURE — 93306 TTE W/DOPPLER COMPLETE: CPT | Mod: 26 | Performed by: INTERNAL MEDICINE

## 2021-05-25 PROCEDURE — 87635 SARS-COV-2 COVID-19 AMP PRB: CPT | Performed by: EMERGENCY MEDICINE

## 2021-05-25 PROCEDURE — 258N000003 HC RX IP 258 OP 636: Performed by: EMERGENCY MEDICINE

## 2021-05-25 PROCEDURE — 250N000011 HC RX IP 250 OP 636: Performed by: INTERNAL MEDICINE

## 2021-05-25 PROCEDURE — 999N001017 HC STATISTIC GLUCOSE BY METER IP

## 2021-05-25 PROCEDURE — 255N000002 HC RX 255 OP 636: Performed by: INTERNAL MEDICINE

## 2021-05-25 PROCEDURE — 74176 CT ABD & PELVIS W/O CONTRAST: CPT

## 2021-05-25 PROCEDURE — 99223 1ST HOSP IP/OBS HIGH 75: CPT | Mod: AI | Performed by: INTERNAL MEDICINE

## 2021-05-25 RX ORDER — LIDOCAINE/PRILOCAINE 2.5 %-2.5%
1 CREAM (GRAM) TOPICAL
Status: COMPLETED | OUTPATIENT
Start: 2021-05-25 | End: 2021-05-25

## 2021-05-25 RX ORDER — METOPROLOL SUCCINATE 100 MG/1
100 TABLET, EXTENDED RELEASE ORAL DAILY
Status: DISCONTINUED | OUTPATIENT
Start: 2021-05-25 | End: 2021-05-28 | Stop reason: HOSPADM

## 2021-05-25 RX ORDER — ERGOCALCIFEROL 1.25 MG/1
50000 CAPSULE, LIQUID FILLED ORAL
Status: DISCONTINUED | OUTPATIENT
Start: 2021-05-25 | End: 2021-05-28 | Stop reason: HOSPADM

## 2021-05-25 RX ORDER — SPIRONOLACTONE 25 MG/1
25 TABLET ORAL DAILY
Status: DISCONTINUED | OUTPATIENT
Start: 2021-05-25 | End: 2021-05-28 | Stop reason: HOSPADM

## 2021-05-25 RX ORDER — SODIUM CHLORIDE, SODIUM LACTATE, POTASSIUM CHLORIDE, CALCIUM CHLORIDE 600; 310; 30; 20 MG/100ML; MG/100ML; MG/100ML; MG/100ML
INJECTION, SOLUTION INTRAVENOUS CONTINUOUS
Status: ACTIVE | OUTPATIENT
Start: 2021-05-25 | End: 2021-05-25

## 2021-05-25 RX ORDER — GADOBUTROL 604.72 MG/ML
7.5 INJECTION INTRAVENOUS ONCE
Status: COMPLETED | OUTPATIENT
Start: 2021-05-25 | End: 2021-05-25

## 2021-05-25 RX ORDER — HYDROCHLOROTHIAZIDE 25 MG/1
25 TABLET ORAL DAILY
Status: DISCONTINUED | OUTPATIENT
Start: 2021-05-25 | End: 2021-05-28 | Stop reason: CLARIF

## 2021-05-25 RX ORDER — FLUTICASONE PROPIONATE 50 MCG
2 SPRAY, SUSPENSION (ML) NASAL DAILY
Status: DISCONTINUED | OUTPATIENT
Start: 2021-05-25 | End: 2021-05-25

## 2021-05-25 RX ORDER — METOPROLOL SUCCINATE 100 MG/1
100 TABLET, EXTENDED RELEASE ORAL DAILY
Status: DISCONTINUED | OUTPATIENT
Start: 2021-05-25 | End: 2021-05-25

## 2021-05-25 RX ORDER — DEXTROSE MONOHYDRATE 25 G/50ML
25-50 INJECTION, SOLUTION INTRAVENOUS
Status: DISCONTINUED | OUTPATIENT
Start: 2021-05-25 | End: 2021-05-28 | Stop reason: HOSPADM

## 2021-05-25 RX ORDER — FERROUS GLUCONATE 324(38)MG
324 TABLET ORAL 2 TIMES DAILY WITH MEALS
Status: DISCONTINUED | OUTPATIENT
Start: 2021-05-25 | End: 2021-05-25

## 2021-05-25 RX ORDER — NALOXONE HYDROCHLORIDE 0.4 MG/ML
0.2 INJECTION, SOLUTION INTRAMUSCULAR; INTRAVENOUS; SUBCUTANEOUS
Status: DISCONTINUED | OUTPATIENT
Start: 2021-05-25 | End: 2021-05-28 | Stop reason: HOSPADM

## 2021-05-25 RX ORDER — HYDROCHLOROTHIAZIDE 25 MG/1
25 TABLET ORAL DAILY
Status: DISCONTINUED | OUTPATIENT
Start: 2021-05-25 | End: 2021-05-25

## 2021-05-25 RX ORDER — ONDANSETRON 4 MG/1
4 TABLET, ORALLY DISINTEGRATING ORAL EVERY 6 HOURS PRN
Status: DISCONTINUED | OUTPATIENT
Start: 2021-05-25 | End: 2021-05-28 | Stop reason: HOSPADM

## 2021-05-25 RX ORDER — PNV NO.95/FERROUS FUM/FOLIC AC 28MG-0.8MG
250 TABLET ORAL 2 TIMES DAILY
Status: DISCONTINUED | OUTPATIENT
Start: 2021-05-25 | End: 2021-05-25

## 2021-05-25 RX ORDER — NALOXONE HYDROCHLORIDE 0.4 MG/ML
0.4 INJECTION, SOLUTION INTRAMUSCULAR; INTRAVENOUS; SUBCUTANEOUS
Status: DISCONTINUED | OUTPATIENT
Start: 2021-05-25 | End: 2021-05-28 | Stop reason: HOSPADM

## 2021-05-25 RX ORDER — CYCLOBENZAPRINE HCL 10 MG
10 TABLET ORAL 2 TIMES DAILY PRN
Status: DISCONTINUED | OUTPATIENT
Start: 2021-05-25 | End: 2021-05-28 | Stop reason: HOSPADM

## 2021-05-25 RX ORDER — AMOXICILLIN 250 MG
1 CAPSULE ORAL 2 TIMES DAILY
Status: DISCONTINUED | OUTPATIENT
Start: 2021-05-25 | End: 2021-05-28 | Stop reason: HOSPADM

## 2021-05-25 RX ORDER — PROCHLORPERAZINE MALEATE 5 MG
10 TABLET ORAL EVERY 6 HOURS PRN
Status: DISCONTINUED | OUTPATIENT
Start: 2021-05-25 | End: 2021-05-28 | Stop reason: HOSPADM

## 2021-05-25 RX ORDER — HYDROXYCHLOROQUINE SULFATE 200 MG/1
400 TABLET, FILM COATED ORAL DAILY
Status: DISCONTINUED | OUTPATIENT
Start: 2021-05-25 | End: 2021-05-25

## 2021-05-25 RX ORDER — LISINOPRIL AND HYDROCHLOROTHIAZIDE 20; 25 MG/1; MG/1
1 TABLET ORAL DAILY
Status: DISCONTINUED | OUTPATIENT
Start: 2021-05-25 | End: 2021-05-25

## 2021-05-25 RX ORDER — HYDROMORPHONE HYDROCHLORIDE 1 MG/ML
0.5 INJECTION, SOLUTION INTRAMUSCULAR; INTRAVENOUS; SUBCUTANEOUS ONCE
Status: COMPLETED | OUTPATIENT
Start: 2021-05-25 | End: 2021-05-25

## 2021-05-25 RX ORDER — ALBUTEROL SULFATE 90 UG/1
2 AEROSOL, METERED RESPIRATORY (INHALATION) EVERY 6 HOURS PRN
Status: DISCONTINUED | OUTPATIENT
Start: 2021-05-25 | End: 2021-05-28 | Stop reason: HOSPADM

## 2021-05-25 RX ORDER — LISINOPRIL 20 MG/1
20 TABLET ORAL DAILY
Status: DISCONTINUED | OUTPATIENT
Start: 2021-05-25 | End: 2021-05-28 | Stop reason: CLARIF

## 2021-05-25 RX ORDER — DIPHENHYDRAMINE HCL 50 MG
50 CAPSULE ORAL ONCE
Status: COMPLETED | OUTPATIENT
Start: 2021-05-25 | End: 2021-05-25

## 2021-05-25 RX ORDER — FEXOFENADINE HCL 180 MG/1
180 TABLET ORAL DAILY PRN
Status: DISCONTINUED | OUTPATIENT
Start: 2021-05-25 | End: 2021-05-28 | Stop reason: HOSPADM

## 2021-05-25 RX ORDER — CYCLOSPORINE 0.5 MG/ML
1 EMULSION OPHTHALMIC EVERY 12 HOURS
Status: DISCONTINUED | OUTPATIENT
Start: 2021-05-25 | End: 2021-05-25

## 2021-05-25 RX ORDER — CLOPIDOGREL BISULFATE 75 MG/1
75 TABLET ORAL DAILY
Status: DISCONTINUED | OUTPATIENT
Start: 2021-05-25 | End: 2021-05-25

## 2021-05-25 RX ORDER — LIDOCAINE 40 MG/G
CREAM TOPICAL
Status: DISCONTINUED | OUTPATIENT
Start: 2021-05-25 | End: 2021-05-28 | Stop reason: HOSPADM

## 2021-05-25 RX ORDER — NICOTINE POLACRILEX 4 MG
15-30 LOZENGE BUCCAL
Status: DISCONTINUED | OUTPATIENT
Start: 2021-05-25 | End: 2021-05-28 | Stop reason: HOSPADM

## 2021-05-25 RX ORDER — AMOXICILLIN 250 MG
2 CAPSULE ORAL 2 TIMES DAILY
Status: DISCONTINUED | OUTPATIENT
Start: 2021-05-25 | End: 2021-05-28 | Stop reason: HOSPADM

## 2021-05-25 RX ORDER — FOLIC ACID 1 MG/1
1 TABLET ORAL DAILY
Status: DISCONTINUED | OUTPATIENT
Start: 2021-05-25 | End: 2021-05-28 | Stop reason: HOSPADM

## 2021-05-25 RX ORDER — HYDROMORPHONE HYDROCHLORIDE 1 MG/ML
.3-.5 INJECTION, SOLUTION INTRAMUSCULAR; INTRAVENOUS; SUBCUTANEOUS
Status: DISCONTINUED | OUTPATIENT
Start: 2021-05-25 | End: 2021-05-28 | Stop reason: HOSPADM

## 2021-05-25 RX ORDER — LORAZEPAM 2 MG/ML
0.5 INJECTION INTRAMUSCULAR ONCE
Status: COMPLETED | OUTPATIENT
Start: 2021-05-25 | End: 2021-05-25

## 2021-05-25 RX ORDER — OXYCODONE HYDROCHLORIDE 5 MG/1
5 TABLET ORAL EVERY 4 HOURS PRN
Status: DISCONTINUED | OUTPATIENT
Start: 2021-05-25 | End: 2021-05-28 | Stop reason: HOSPADM

## 2021-05-25 RX ORDER — ASPIRIN 81 MG/1
81 TABLET ORAL DAILY
Status: DISCONTINUED | OUTPATIENT
Start: 2021-05-25 | End: 2021-05-28 | Stop reason: HOSPADM

## 2021-05-25 RX ORDER — ALBUTEROL SULFATE 0.83 MG/ML
2.5 SOLUTION RESPIRATORY (INHALATION) EVERY 6 HOURS PRN
Status: DISCONTINUED | OUTPATIENT
Start: 2021-05-25 | End: 2021-05-28 | Stop reason: HOSPADM

## 2021-05-25 RX ORDER — FAMOTIDINE 20 MG/1
20 TABLET, FILM COATED ORAL 2 TIMES DAILY PRN
Qty: 20 TABLET | Refills: 0 | Status: SHIPPED | OUTPATIENT
Start: 2021-05-25 | End: 2021-05-28

## 2021-05-25 RX ORDER — PROCHLORPERAZINE 25 MG
25 SUPPOSITORY, RECTAL RECTAL EVERY 12 HOURS PRN
Status: DISCONTINUED | OUTPATIENT
Start: 2021-05-25 | End: 2021-05-28 | Stop reason: HOSPADM

## 2021-05-25 RX ORDER — FOLIC ACID 1 MG/1
1 TABLET ORAL DAILY
Status: DISCONTINUED | OUTPATIENT
Start: 2021-05-25 | End: 2021-05-25

## 2021-05-25 RX ORDER — ONDANSETRON 2 MG/ML
4 INJECTION INTRAMUSCULAR; INTRAVENOUS EVERY 6 HOURS PRN
Status: DISCONTINUED | OUTPATIENT
Start: 2021-05-25 | End: 2021-05-28 | Stop reason: HOSPADM

## 2021-05-25 RX ORDER — SODIUM CHLORIDE, SODIUM LACTATE, POTASSIUM CHLORIDE, CALCIUM CHLORIDE 600; 310; 30; 20 MG/100ML; MG/100ML; MG/100ML; MG/100ML
INJECTION, SOLUTION INTRAVENOUS
Status: DISCONTINUED
Start: 2021-05-25 | End: 2021-05-25 | Stop reason: HOSPADM

## 2021-05-25 RX ORDER — ONDANSETRON 4 MG/1
4 TABLET, FILM COATED ORAL EVERY 8 HOURS PRN
Qty: 15 TABLET | Refills: 0 | Status: SHIPPED | OUTPATIENT
Start: 2021-05-25 | End: 2021-05-28

## 2021-05-25 RX ORDER — PRAVASTATIN SODIUM 40 MG
40 TABLET ORAL DAILY
Status: DISCONTINUED | OUTPATIENT
Start: 2021-05-25 | End: 2021-05-25

## 2021-05-25 RX ORDER — CALCIUM CARBONATE 500 MG/1
1000 TABLET, CHEWABLE ORAL 2 TIMES DAILY PRN
Status: DISCONTINUED | OUTPATIENT
Start: 2021-05-25 | End: 2021-05-28 | Stop reason: HOSPADM

## 2021-05-25 RX ORDER — HYDROXYCHLOROQUINE SULFATE 200 MG/1
400 TABLET, FILM COATED ORAL DAILY
Status: DISCONTINUED | OUTPATIENT
Start: 2021-05-25 | End: 2021-05-28 | Stop reason: HOSPADM

## 2021-05-25 RX ORDER — POLYETHYLENE GLYCOL 3350 17 G/17G
17 POWDER, FOR SOLUTION ORAL DAILY PRN
Status: DISCONTINUED | OUTPATIENT
Start: 2021-05-25 | End: 2021-05-28 | Stop reason: HOSPADM

## 2021-05-25 RX ORDER — NITROGLYCERIN 0.4 MG/1
0.4 TABLET SUBLINGUAL EVERY 5 MIN PRN
Status: DISCONTINUED | OUTPATIENT
Start: 2021-05-25 | End: 2021-05-28 | Stop reason: HOSPADM

## 2021-05-25 RX ORDER — PRAVASTATIN SODIUM 40 MG
40 TABLET ORAL DAILY
Status: DISCONTINUED | OUTPATIENT
Start: 2021-05-25 | End: 2021-05-28 | Stop reason: HOSPADM

## 2021-05-25 RX ORDER — CLOPIDOGREL BISULFATE 75 MG/1
75 TABLET ORAL DAILY
Status: DISCONTINUED | OUTPATIENT
Start: 2021-05-25 | End: 2021-05-28 | Stop reason: HOSPADM

## 2021-05-25 RX ORDER — LISINOPRIL 20 MG/1
20 TABLET ORAL DAILY
Status: DISCONTINUED | OUTPATIENT
Start: 2021-05-25 | End: 2021-05-25

## 2021-05-25 RX ORDER — SPIRONOLACTONE 25 MG/1
25 TABLET ORAL DAILY
Status: DISCONTINUED | OUTPATIENT
Start: 2021-05-25 | End: 2021-05-25

## 2021-05-25 RX ORDER — MONTELUKAST SODIUM 10 MG/1
10 TABLET ORAL AT BEDTIME
Status: DISCONTINUED | OUTPATIENT
Start: 2021-05-25 | End: 2021-05-28 | Stop reason: HOSPADM

## 2021-05-25 RX ORDER — HYDROCODONE BITARTRATE AND ACETAMINOPHEN 5; 325 MG/1; MG/1
1 TABLET ORAL EVERY 6 HOURS PRN
Qty: 10 TABLET | Refills: 0 | Status: SHIPPED | OUTPATIENT
Start: 2021-05-25 | End: 2021-05-28

## 2021-05-25 RX ORDER — ASPIRIN 81 MG/1
81 TABLET ORAL DAILY
Status: DISCONTINUED | OUTPATIENT
Start: 2021-05-25 | End: 2021-05-25

## 2021-05-25 RX ADMIN — FOLIC ACID 1 MG: 1 TABLET ORAL at 20:55

## 2021-05-25 RX ADMIN — HYDROXYCHLOROQUINE SULFATE 400 MG: 200 TABLET, FILM COATED ORAL at 20:53

## 2021-05-25 RX ADMIN — ASPIRIN 81 MG: 81 TABLET, COATED ORAL at 20:54

## 2021-05-25 RX ADMIN — GADOBUTROL 7.5 ML: 604.72 INJECTION INTRAVENOUS at 18:50

## 2021-05-25 RX ADMIN — LISINOPRIL 20 MG: 20 TABLET ORAL at 21:03

## 2021-05-25 RX ADMIN — PRAVASTATIN SODIUM 40 MG: 40 TABLET ORAL at 20:54

## 2021-05-25 RX ADMIN — LORAZEPAM 0.5 MG: 2 INJECTION INTRAMUSCULAR; INTRAVENOUS at 17:12

## 2021-05-25 RX ADMIN — SODIUM CHLORIDE: 9 INJECTION, SOLUTION INTRAVENOUS at 02:52

## 2021-05-25 RX ADMIN — DIPHENHYDRAMINE HYDROCHLORIDE 50 MG: 50 CAPSULE ORAL at 04:26

## 2021-05-25 RX ADMIN — ERGOCALCIFEROL 50000 UNITS: 1.25 CAPSULE, LIQUID FILLED ORAL at 20:55

## 2021-05-25 RX ADMIN — OXYCODONE HYDROCHLORIDE 5 MG: 5 TABLET ORAL at 16:29

## 2021-05-25 RX ADMIN — SPIRONOLACTONE 25 MG: 25 TABLET ORAL at 20:54

## 2021-05-25 RX ADMIN — OMEPRAZOLE 20 MG: 20 CAPSULE, DELAYED RELEASE ORAL at 10:55

## 2021-05-25 RX ADMIN — POTASSIUM CHLORIDE 40 MEQ: 750 TABLET, EXTENDED RELEASE ORAL at 02:56

## 2021-05-25 RX ADMIN — KETOROLAC TROMETHAMINE 15 MG: 15 INJECTION, SOLUTION INTRAMUSCULAR; INTRAVENOUS at 01:38

## 2021-05-25 RX ADMIN — HYDROMORPHONE HYDROCHLORIDE 0.5 MG: 1 INJECTION, SOLUTION INTRAMUSCULAR; INTRAVENOUS; SUBCUTANEOUS at 01:38

## 2021-05-25 RX ADMIN — HYDROMORPHONE HYDROCHLORIDE 0.5 MG: 1 INJECTION, SOLUTION INTRAMUSCULAR; INTRAVENOUS; SUBCUTANEOUS at 05:28

## 2021-05-25 RX ADMIN — LIDOCAINE AND PRILOCAINE 1 G: 25; 25 CREAM TOPICAL at 00:12

## 2021-05-25 RX ADMIN — SODIUM CHLORIDE 1000 ML: 9 INJECTION, SOLUTION INTRAVENOUS at 01:27

## 2021-05-25 RX ADMIN — MONTELUKAST 10 MG: 10 TABLET, FILM COATED ORAL at 21:02

## 2021-05-25 RX ADMIN — HUMAN SECRETIN 14.96 MCG: 16 INJECTION, POWDER, LYOPHILIZED, FOR SOLUTION INTRAVENOUS at 17:50

## 2021-05-25 RX ADMIN — ONDANSETRON 4 MG: 2 INJECTION INTRAMUSCULAR; INTRAVENOUS at 01:39

## 2021-05-25 RX ADMIN — DOCUSATE SODIUM 50 MG AND SENNOSIDES 8.6 MG 1 TABLET: 8.6; 5 TABLET, FILM COATED ORAL at 20:54

## 2021-05-25 RX ADMIN — LIDOCAINE HYDROCHLORIDE 30 ML: 20 SOLUTION ORAL; TOPICAL at 10:50

## 2021-05-25 RX ADMIN — SODIUM CHLORIDE, POTASSIUM CHLORIDE, SODIUM LACTATE AND CALCIUM CHLORIDE: 600; 310; 30; 20 INJECTION, SOLUTION INTRAVENOUS at 10:01

## 2021-05-25 RX ADMIN — SODIUM CHLORIDE, POTASSIUM CHLORIDE, SODIUM LACTATE AND CALCIUM CHLORIDE 1000 ML: 600; 310; 30; 20 INJECTION, SOLUTION INTRAVENOUS at 05:30

## 2021-05-25 RX ADMIN — DOCUSATE SODIUM 50 MG AND SENNOSIDES 8.6 MG 1 TABLET: 8.6; 5 TABLET, FILM COATED ORAL at 10:55

## 2021-05-25 RX ADMIN — OMEPRAZOLE 20 MG: 20 CAPSULE, DELAYED RELEASE ORAL at 21:02

## 2021-05-25 RX ADMIN — HUMAN SECRETIN 0.2 MCG: 16 INJECTION, POWDER, LYOPHILIZED, FOR SOLUTION INTRAVENOUS at 17:50

## 2021-05-25 RX ADMIN — Medication 200 MG: at 20:53

## 2021-05-25 RX ADMIN — OXYCODONE HYDROCHLORIDE 5 MG: 5 TABLET ORAL at 20:54

## 2021-05-25 RX ADMIN — CLOPIDOGREL BISULFATE 75 MG: 75 TABLET ORAL at 20:54

## 2021-05-25 RX ADMIN — METOPROLOL SUCCINATE 100 MG: 100 TABLET, EXTENDED RELEASE ORAL at 20:53

## 2021-05-25 ASSESSMENT — ACTIVITIES OF DAILY LIVING (ADL)
VISION_MANAGEMENT: GLASSSES
ADLS_ACUITY_SCORE: 13
WEAR_GLASSES_OR_BLIND: YES
ADLS_ACUITY_SCORE: 13
TOILETING_ISSUES: NO
DOING_ERRANDS_INDEPENDENTLY_DIFFICULTY: NO
WALKING_OR_CLIMBING_STAIRS_DIFFICULTY: NO
DIFFICULTY_EATING/SWALLOWING: NO
CONCENTRATING,_REMEMBERING_OR_MAKING_DECISIONS_DIFFICULTY: YES
DRESSING/BATHING_DIFFICULTY: NO
ADLS_ACUITY_SCORE: 13
FALL_HISTORY_WITHIN_LAST_SIX_MONTHS: YES
ADLS_ACUITY_SCORE: 13
DIFFICULTY_COMMUNICATING: NO

## 2021-05-25 ASSESSMENT — MIFFLIN-ST. JEOR: SCORE: 1317.57

## 2021-05-25 NOTE — PLAN OF CARE
Came from [place] ED at 7am. VSS on RA. SBA. Clear liquid diet, not taking much in. Intermittent nausea. GI cocktail given to try and help with abdominal pain. Heat pads in use. Voiding spontaneously. No BM, +BS. PIV infusing IVMF. BG ACHS. BG 85 at noon- apple juice given. Pt becoming more irritated throughout the day. Respected pt wishes on wanting some sleep. See provider notification. Pt resting between cares.  Continue POC.     Update: pt upset about noise and commotion in room from roommate. Requested to leave room for a while. Staff helped walk pt to sunroom.

## 2021-05-25 NOTE — ED NOTES
Pt states she was able to keep down some ice chips and willing to try oral fluids. Gingerale given.

## 2021-05-25 NOTE — ED NOTES
Pt states she wants lidocaine for IV placement. And states she will only only RN to look at her L hand which appears previously venipunctured and bruised.

## 2021-05-25 NOTE — ED PROVIDER NOTES
History     Chief Complaint   Patient presents with     Abdominal Pain     c/o pain mid upper abdominal pain to umbilical area and RUQ. Pain radiates to her back. Pain is worse after eating. Onset was may 10th     Nausea & Vomiting     c/o intermittent nausea and episodes of vomiting at home. Unable to keep food down.      HPI  Janine Cornell is a 55 year old female with a past medical history of PCOS, chronic pancreatitis, hypertension, ischemic cardiomyopathy, fibromyalgia, type 2 diabetes, migraines, coronary artery disease, Wegener's, hyperlipidemia who presents to the emergency department with a chief complaint of abdominal pain.  Located in the middle upper abdomen and radiating to the umbilical area and right upper quadrant.  It radiates to her back.  Pain worsens after eating.  Started on May 10.  Also associated with intermittent nausea and episodes of vomiting at home.  Patient states she is unable to keep food down today.  No associated fevers on arrival to the emergency department, however the patient states she has been having sweating episodes at home.  She also notes some urinary frequency.  She states she often has constipation/diarrhea, most recently more constipation.  Patient states she has had problems with her gallbladder and pancreas in the past.  She states she has had a cholecystectomy.    I have reviewed the Medications, Allergies, Past Medical and Surgical History, and Social History in the [employer] system.    Past Medical History:   Diagnosis Date     Abnormal glandular Papanicolaou smear of cervix 1992     Allergic rhinitis     Allergy, airborne subst     Arthritis      ASCVD (arteriosclerotic cardiovascular disease)      Chronic pain      Chronic pancreatitis (H)     idiopathic, Tx: PPI, antioxidants, pancreatic enzymes     Common migraine      Coronary artery disease      Costochondritis      Difficulty in walking(719.7)      Dyspnea on exertion      Ectasia, mammary duct     [facility], [facility] n[facility] discharge     Elevated fasting glucose      Gastro-oesophageal reflux disease      Hernia      History of angina      Hyperlipidemia LDL goal < 100      Hypertension goal BP (blood pressure) < 140/90     Essential hypertension     Iron deficiency anemia      Ischemic cardiomyopathy      Menorrhagia      Migraine headaches      Mild persistent asthma      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 Matias, neg w/u, Rx'd w/prednisone     Ventral hernia, unspecified, without mention of obstruction or gangrene 2012     Past Surgical History:   Procedure Laterality Date     C ECHO HEART XTHORACIC,COMPLETE, W/O DOPPLER  04    [facility] WNL, EF 60%     C/SECTION, LOW TRANSVERSE           CARDIAC SURGERY      cardiac stent- recent in 16; 4 other stents     DILATION AND CURETTAGE N/A 2016    Procedure: DILATION AND CURETTAGE;  Surgeon: Nahed Butler MD;  Location: [facility]     HC UGI ENDOSCOPY W EUS  08    Dr. Pastrana, possible chronic pancreatitis, fatty liver     [place] [facility] [place] INTRAUTERINE DEVICE N/A 2016    Procedure: INSERT INTRAUTERINE DEVICE;  Surgeon: Nahed Butler MD;  Location: [facility]     INT UTERINE FIBRIOD EMBOLIZATION  10/29/2014     LAPAROSCOPIC  CHOLECYSTECTOMY  5/28/08    Dr. Arnol GRUBBS TX, CERVICAL      s/p LEEP     ORBITOTOMY Right 3/15/2016    Procedure: ORBITOTOMY;  Surgeon: Myron Cyr MD;  Location: [facility]     ORBITOTOMY Right 8/4/2017    Procedure: ORBITOTOMY;  RIGHT ORBITOTOMY AND BIOPSY;  Surgeon: Charis Holbrook MD;  Location: [facility]     REPAIR PTOSIS Right 11/17/2017    Procedure: REPAIR PTOSIS;  RIGHT UPPER LID PTOSIS REPAIR;  Surgeon: Myron Cyr MD;  [facility]     UPPER GI ENDOSCOPY  10/21/08    mild gastritis, Dr. Hidalgo     No current facility-administered medications for this encounter.      Current Outpatient Medications   Medication     acetaminophen (TYLENOL) 325 MG tablet     albuterol (2.5 MG/3ML) 0.083% neb solution     albuterol (PROAIR HFA, PROVENTIL HFA, VENTOLIN HFA) 108 (90 BASE) MCG/ACT inhaler     ASPIRIN LOW DOSE 81 MG EC tablet     blood glucose monitoring (NO BRAND SPECIFIED) meter device kit     blood glucose monitoring (NO BRAND SPECIFIED) test strip     Blood Pressure Monitor KIT     calcium carbonate (TUMS) 500 MG chewable tablet     clopidogrel (PLAVIX) 75 MG tablet     COMPRESSION STOCKINGS     cyclobenzaprine (FLEXERIL) 10 MG tablet     cycloSPORINE (RESTASIS) 0.05 % ophthalmic emulsion     desonide (DESOWEN) 0.05 % cream     dicyclomine (BENTYL) 20 MG tablet     diphenhydrAMINE (BENADRYL) 25 MG capsule     EPIPEN 2-RIKY 0.3 MG/0.3ML injection     ferrous gluconate (FERGON) 324 (38 Fe) MG tablet     fexofenadine (ALLEGRA) 180 MG tablet     fluocinolone (SYNALAR) 0.01 % solution     fluocinonide (LIDEX) 0.05 % external ointment     fluticasone-vilanterol (BREO ELLIPTA) 100-25 MCG/INH inhaler     folic acid (FOLVITE) 1 MG tablet     hydroxychloroquine (PLAQUENIL) 200 MG tablet     insulin pen needle (BD MARCK U/F) 32G X 4 MM     ketoconazole (NIZORAL) 2 % shampoo     lidocaine (LMX4) 4 % external cream     lisinopril-hydrochlorothiazide (ZESTORETIC) 20-25 MG tablet     Magnesium Oxide -Mg  Supplement 250 MG tablet     metFORMIN (GLUCOPHAGE-XR) 500 MG 24 hr tablet     metoprolol succinate ER (TOPROL-XL) 100 MG 24 hr tablet     metroNIDAZOLE (NORITATE) 1 % cream     montelukast (SINGULAIR) 10 MG tablet     Multiple Vitamin (DAILY-GERALD) TABS     nitroGLYcerin (NITROSTAT) 0.4 MG sublingual tablet     nystatin (MYCOSTATIN) 513714 UNITS TABS tablet     pravastatin (PRAVACHOL) 40 MG tablet     sennosides (SENOKOT) 8.6 MG tablet     spironolactone (ALDACTONE) 25 MG tablet     traMADol (ULTRAM) 50 MG tablet     Triamcinolone Acetonide (NASACORT ALLERGY 24HR NA)     vitamin D2 (ERGOCALCIFEROL) 18848 units (1250 mcg) capsule     Allergies   Allergen Reactions     Amoxicillin-Pot Clavulanate      Augmentin      Unknown possible hives and edema     Azithromycin      Diatrizoate Other (See Comments)     Pt wants this listed because she is allergic to shellfish      Imitrex [Sumatriptan]      Severe face/neck/chest tightness and flushing side effects      Penicillins Hives     Unknown      Pork Allergy      Stomach pain, cramping, diarrhea, itching, nausea and headaches     Shellfish Allergy Hives and Swelling     Sulfa Drugs Hives and Swelling     Zithromax [Azithromycin Dihydrate] Swelling     Unknown      Past medical history, past surgical history, medications, and allergies were reviewed with the patient. Additional pertinent items: None    Social History     Socioeconomic History     Marital status: Single     Spouse name: Not on file     Number of children: 1     Years of education: Not on file     Highest education level: Not on file   Occupational History     Employer: NONE    Social Needs     Financial resource strain: Not on file     Food insecurity     Worry: Not on file     Inability: Not on file     Transportation needs     Medical: Not on file     Non-medical: Not on file   Tobacco Use     Smoking status: Current Every Day Smoker     Packs/day: 0.20     Years: 1.00     Pack years: 0.20     Types:  Cigarettes     Last attempt to quit: 2016     Years since quittin.3     Smokeless tobacco: Never Used   Substance and Sexual Activity     Alcohol use: No     Alcohol/week: 0.0 standard drinks     Drug use: No     Sexual activity: Not Currently   Lifestyle     Physical activity     Days per week: Not on file     Minutes per session: Not on file     Stress: Not on file   Relationships     Social connections     Talks on phone: Not on file     Gets together: Not on file     Attends Tenriism service: Not on file     Active member of club or organization: Not on file     Attends meetings of clubs or organizations: Not on file     Relationship status: Not on file     Intimate partner violence     Fear of current or ex partner: Not on file     Emotionally abused: Not on file     Physically abused: Not on file     Forced sexual activity: Not on file   Other Topics Concern     Parent/sibling w/ CABG, MI or angioplasty before 65F 55M? Yes   Social History Narrative    Balanced Diet - sometimes    Osteoporosis Prevention Measures - Dairy servings per day: 2 servings weekly    Regular Exercise -  Yes Describe walking 4 times a week    Dental Exam - NO    Seatbelts used - Yes    Self Breast Exam - Yes    Abuse: Current or Past (Physical, Sexual or Emotional)- No    Do you have any concerns about STD's -  No    Do you feel safe in your environment - No    Guns stored in the home - No    Sunscreen used - Yes    Lipids -  YES - Date:     Glucose -  YES - Date:     Eye Exam - YES - Date: one year ago    Colon Cancer Screening - No    Hemoccults - NO    Pap Test -  YES - Date: , remote history of LEEP    Mammography - YES - Date: last spring, would like to discuss, needs a referral to [facility]    DEXA - NO    Immunizations reviewed and up to date - Yes, last td given in  or      Social history was reviewed with the patient. Additional pertinent items: None    Review of  "Systems  General: No fevers or chills  Skin: No rash or diaphoresis  Eyes: No eye redness or discharge  Ears/Nose/Throat: No rhinorrhea or nasal congestion  Respiratory: No cough or SOB  Cardiovascular: No chest pain or palpitations  Gastrointestinal: See HPI  Genitourinary: Positive for urinary frequency, no hematuria, or dysuria  Musculoskeletal: No arthralgias or myalgias  Neurologic: No numbness or weakness  Hematologic/Lymphatic/Immunologic: No leg swelling, no easy bruising/bleeding  Endocrine: No polyuria/polydypsia    A complete review of systems was performed with pertinent positives and negatives noted in the HPI, and all other systems negative.    Physical Exam   BP: (!) 139/96  Pulse: 116  Temp: 97.7  F (36.5  C)  Resp: 20  Height: 160 cm (5' 3\")  Weight: 72.3 kg (159 lb 8 oz)  SpO2: 99 %      General: Well nourished, well developed, NAD  HEENT: EOMI, anicteric. NCAT, MMM  Neck: no jugular venous distension, supple, nl ROM  Cardiac: Tachycardic rate, regular rhythm. No murmurs, rubs, or gallops. Normal S1, S2.  Intact peripheral pulses  Pulm: CTAB, no stridor, wheezes, rales, rhonchi  Abd: Soft, tenderness to palpation in the epigastric region without rebound or guarding, no CVA tenderness, nondistended.  No masses palpated.    Skin: Warm and dry to the touch.  No rash  Extremities: No LE edema, no cyanosis, w/w/p  Neuro: A&Ox3, no gross focal deficits    ED Course        Procedures                EKG Interpretation:      Interpreted by Jane Alvarez MD  Time reviewed:2254   Symptoms at time of EKG: Epigastric abdominal pain  Rhythm: normal sinus   Rate: normal, 94 bpm  Axis: Left  Ectopy: none  Conduction: normal  ST Segments/ T Waves: No ST-T wave changes  Q Waves: none  LAE  Comparison to prior: Unchanged from February 6, 2020    Clinical Impression: abnormal EKG                  Labs Ordered and Resulted from Time of ED Arrival Up to the Time of Departure from the ED   CBC WITH PLATELETS " DIFFERENTIAL - Abnormal; Notable for the following components:       Result Value    WBC 13.5 (*)     RBC Count 5.39 (*)     MCH 25.8 (*)     Absolute Neutrophil 10.0 (*)     All other components within normal limits   COMPREHENSIVE METABOLIC PANEL - Abnormal; Notable for the following components:    Potassium 3.3 (*)     Glucose 347 (*)     All other components within normal limits   LIPASE - Abnormal; Notable for the following components:    Lipase 407 (*)     All other components within normal limits   UA MACROSCOPIC WITH REFLEX TO MICRO AND CULTURE - Abnormal; Notable for the following components:    Glucose Urine >1000 (*)     Ketones Urine 10 (*)     All other components within normal limits   GLUCOSE BY METER - Abnormal; Notable for the following components:    Glucose 316 (*)     All other components within normal limits   HCG QUAL URINE POCT - Normal   TROPONIN I   LACTIC ACID WHOLE BLOOD   PERIPHERAL IV CATHETER            Results for orders placed or performed during the hospital encounter of 05/24/21 (from the past 24 hour(s))   CBC with platelets differential   Result Value Ref Range    WBC 13.5 (H) 4.0 - 11.0 10e9/L    RBC Count 5.39 (H) 3.8 - 5.2 10e12/L    Hemoglobin 13.9 11.7 - 15.7 g/dL    Hematocrit 41.8 35.0 - 47.0 %    MCV 78 78 - 100 fl    MCH 25.8 (L) 26.5 - 33.0 pg    MCHC 33.3 31.5 - 36.5 g/dL    RDW 13.0 10.0 - 15.0 %    Platelet Count 416 150 - 450 10e9/L    Diff Method Automated Method     % Neutrophils 74.6 %    % Lymphocytes 15.0 %    % Monocytes 7.9 %    % Eosinophils 1.9 %    % Basophils 0.4 %    % Immature Granulocytes 0.2 %    Nucleated RBCs 0 0 /100    Absolute Neutrophil 10.0 (H) 1.6 - 8.3 10e9/L    Absolute Lymphocytes 2.0 0.8 - 5.3 10e9/L    Absolute Monocytes 1.1 0.0 - 1.3 10e9/L    Absolute Eosinophils 0.3 0.0 - 0.7 10e9/L    Absolute Basophils 0.1 0.0 - 0.2 10e9/L    Abs Immature Granulocytes 0.0 0 - 0.4 10e9/L    Absolute Nucleated RBC 0.0    Comprehensive metabolic panel    Result Value Ref Range    Sodium 136 133 - 144 mmol/L    Potassium 3.3 (L) 3.4 - 5.3 mmol/L    Chloride 100 94 - 109 mmol/L    Carbon Dioxide 26 20 - 32 mmol/L    Anion Gap 10 3 - 14 mmol/L    Glucose 347 (H) 70 - 99 mg/dL    Urea Nitrogen 19 7 - 30 mg/dL    Creatinine 0.92 0.52 - 1.04 mg/dL    GFR Estimate 70 >60 mL/min/[1.73_m2]    GFR Estimate If Black 81 >60 mL/min/[1.73_m2]    Calcium 9.9 8.5 - 10.1 mg/dL    Bilirubin Total 0.3 0.2 - 1.3 mg/dL    Albumin 4.3 3.4 - 5.0 g/dL    Protein Total 8.0 6.8 - 8.8 g/dL    Alkaline Phosphatase 104 40 - 150 U/L    ALT 30 0 - 50 U/L    AST 15 0 - 45 U/L   Lipase   Result Value Ref Range    Lipase 407 (H) 73 - 393 U/L   Troponin I   Result Value Ref Range    Troponin I ES <0.015 0.000 - 0.045 ug/L   UA reflex to Microscopic and Culture    Specimen: Urine clean catch; Unspecified Urine   Result Value Ref Range    Color Urine Light Yellow     Appearance Urine Clear     Glucose Urine >1000 (A) NEG^Negative mg/dL    Bilirubin Urine Negative NEG^Negative    Ketones Urine 10 (A) NEG^Negative mg/dL    Specific Gravity Urine 1.020 1.003 - 1.035    Blood Urine Negative NEG^Negative    pH Urine 5.5 5.0 - 7.0 pH    Protein Albumin Urine Negative NEG^Negative mg/dL    Urobilinogen mg/dL Normal 0.0 - 2.0 mg/dL    Nitrite Urine Negative NEG^Negative    Leukocyte Esterase Urine Negative NEG^Negative    Source Unspecified Urine    hCG qual urine POCT   Result Value Ref Range    HCG Qual Urine Negative neg    Internal QC OK Yes    Glucose by meter   Result Value Ref Range    Glucose 316 (H) 70 - 99 mg/dL   Abdomen US, limited (RUQ only)    Narrative    EXAM: US ABDOMEN LIMITED  LOCATION: [facility]  DATE/TIME: 5/24/2021 10:43 PM    INDICATION: Right upper quadrant pain after eating  COMPARISON: CT abdomen and pelvis 10/30/2018  TECHNIQUE: Limited abdominal ultrasound.    FINDINGS:    GALLBLADDER: Cholecystectomy.     BILE DUCTS: No biliary dilatation. The common duct measures  3 mm.    LIVER: Normal parenchyma is echogenic consistent with fatty infiltration. No focal mass.    RIGHT KIDNEY: No hydronephrosis.    PANCREAS: The parenchyma is hypoechoic with slightly lobular contour.    No ascites.      Impression    IMPRESSION:  1.  Cholecystectomy.  2.  Fatty liver.  3.  Hypoechoic and slightly lobular contour to the pancreas. No definite mass. CT recommended for further evaluation.         *Note: Due to a large number of results and/or encounters for the requested time period, some results have not been displayed. A complete set of results can be found in Results Review.       Labs, vital signs, and imaging studies were reviewed by me.    Medications   0.9% sodium chloride BOLUS (has no administration in time range)     Followed by   sodium chloride 0.9% infusion (has no administration in time range)   ondansetron (ZOFRAN) injection 4 mg (has no administration in time range)   ketorolac (TORADOL) injection 15 mg (has no administration in time range)   potassium chloride ER (KLOR-CON M) CR tablet 40 mEq (has no administration in time range)       Assessments & Plan (with Medical Decision Making)   Janine Cornell is a 55 year old female who presents with epigastric abdominal pain.  Differential diagnosis includes pancreatitis, gastritis/gastroenteritis, cholelithiasis, cholecystitis, urinary tract infection, nephrolithiasis, diverticulitis, ACS.  Labs, EKG, right upper quadrant ultrasound ordered to further evaluate the patient.  IV fluids and medications to treat patient's symptoms were ordered in the emergency department.    Laboratory work-up is remarkable for leukocytosis, mildly elevated lipase at 407, potassium 3.3 (this was replaced in the emergency department), glucose 316, glucose urea and ketonuria are present, urinalysis is not consistent with UTI.     EKG is unchanged from prior.    Ultrasound shows fatty liver, hypoechoic/slightly lobular contour to pancreas without definite  mass, CT is recommended for further evaluation    I have reviewed the nursing notes.    I have reviewed the findings, diagnosis, plan and need for follow up with the patient.    Patient to be signed out to oncoming provider, Dr. Bullock. CT of the abdomen is pending at this time.  If negative, patient will need to be p.o. challenge to determine disposition.  If CT is negative and patient is able to pass a p.o. challenge, likely discharge home with follow-up with PCP with instructions to return to the emergency department should symptoms worsen.    New Prescriptions    No medications on file       Final diagnoses:   Abdominal pain, epigastric   Leukocytosis, unspecified type   Urinary frequency   Hypokalemia     Jane Alvarez MD  5/24/2021   [facility] EMERGENCY DEPARTMENT     Jane Alvarez MD  05/25/21 0043

## 2021-05-25 NOTE — PROVIDER NOTIFICATION
"Notified MD at 0811 AM regarding Pt refusing lab draw. states \"she wants no pokes\".  Pt also refusing morning meds. States she only takes pain meds as needed in the morning and insulin.     Dr. Crow came to see pt.       Update:     Notified Dr. Crow 12:23pm-Pt very upset. She wants me to let you know is is getting very irritated with not being able to eat and not being able to sleep. Upset with having to get BG drawn when not eating. Current BG 85.    Spoke with Dr. Crow- no new orders.    Gave pt some apple juice for BG.     Will continue to monitor.         "

## 2021-05-25 NOTE — PHARMACY-ADMISSION MEDICATION HISTORY
Admission Medication History Completed by Pharmacy    See [facility] Admission Navigator for allergy information, preferred outpatient pharmacy, prior to admission medications and immunization status.     Medication History Sources:     Patient, sure scripts    Changes made to PTA medication list (reason):    Added: insulin glargine 58 units QAM    Deleted: Restasis eye drops, desonide 0.05% cream, Nasacort nasal spray, Breo Ellipta inhaler, lidocaine 4% LMX cream, metronidazole cream, nytatin tablet    Changed: ferrous gluconate- pt reports not currently taking but may start again if her labs dictate need- left on list and marked not taking.    Additional Information:    Pt states she takes ALL medications at night with exception of her Lantus which is QAM.      Has been out of her weekly vitamin D capsule for a few weeks.      Prior to Admission medications    Medication Sig Last Dose Taking? Auth Provider   diphenhydrAMINE (BENADRYL) 25 MG capsule Take 1 capsule (25 mg) by mouth nightly as needed for itching or allergies 5/24/2021 at Unknown time Yes Majo Mckinnon MD   insulin glargine (LANTUS PEN) 100 UNIT/ML pen Inject 58 Units Subcutaneous every morning 5/24/2021 at am Yes Unknown, Entered By History   traMADol (ULTRAM) 50 MG tablet Take 1 tablet (50 mg) by mouth every 8 hours as needed for moderate pain Past Week at Unknown time Yes Majo Mckinnon MD   vitamin D2 (ERGOCALCIFEROL) 16439 units (1250 mcg) capsule Take 1 capsule (50,000 Units) by mouth every 7 days Past Month at Unknown time Yes Majo Mckinnon MD   acetaminophen (TYLENOL) 325 MG tablet Take 1-2 tablets (325-650 mg) by mouth every 6 hours as needed for pain (headache) prn at prn  Majo Mckinnon MD   albuterol (2.5 MG/3ML) 0.083% neb solution INL 1 VIAL VIA NEBULIZATION Q 4 TO 6 HOURS PRN prn at prn  Reported, Patient   albuterol (PROAIR HFA, PROVENTIL HFA, VENTOLIN HFA) 108 (90 BASE) MCG/ACT inhaler Inhale 2 puffs into the lungs every 6  hours as needed for shortness of breath / dyspnea or wheezing prn at prn  Georgia Xavier MD   ASPIRIN LOW DOSE 81 MG EC tablet TAKE 1 TABLET(81 MG) BY MOUTH DAILY 5/23/2021 at pm  Milan Peace MD   calcium carbonate (TUMS) 500 MG chewable tablet Take 3-4 tablets (1,500-2,000 mg) by mouth daily as needed prn at prn  Majo Mckinnon MD   clopidogrel (PLAVIX) 75 MG tablet Take 1 tablet (75 mg) by mouth daily 5/23/2021 at pm  Milan Peace MD   cyclobenzaprine (FLEXERIL) 10 MG tablet Take 1 tablet (10 mg) by mouth 2 times daily as needed for muscle spasms prn at prn  Majo Mckinnon MD   dicyclomine (BENTYL) 20 MG tablet TAKE 1 TABLET(20 MG) BY MOUTH FOUR TIMES DAILY AS NEEDED. Pls schedule clinic appt for refills. prn at prn  Kash Solano MD   EPIPEN 2-RIKY 0.3 MG/0.3ML injection INJECT 0.3 MG INTO THE MUSCLE PRF ANAPHYALAXIS prn at prn  Reported, Patient   ferrous gluconate (FERGON) 324 (38 Fe) MG tablet Take 1 tablet (324 mg) by mouth 2 times daily (with meals) DO NOT CRUSH. Absorbed best on an empty stomach. If stomach upset occurs, can take with meals. For additional refills, please schedule a follow-up appointment with Dr Solano at [phone]  Patient not taking: Reported on 5/25/2021 Not Taking at Unknown time  Kash Solano MD   fexofenadine (ALLEGRA) 180 MG tablet Take 1 tablet by mouth daily as needed. prn at prn  Evelin Jonas APRN CNP   fluocinolone (SYNALAR) 0.01 % solution Apply topically daily as needed   Reported, Patient   fluocinonide (LIDEX) 0.05 % external ointment Apply topically as needed   Reported, Patient   folic acid (FOLVITE) 1 MG tablet Take 1 tablet by mouth daily 5/23/2021 at pm  Majo Mckinnon MD   hydroxychloroquine (PLAQUENIL) 200 MG tablet Take 2 tablets (400 mg) by mouth daily (Annual eye exam/plaquenil screening) 5/23/2021 at pm  Majo Mckinnon MD   ketoconazole (NIZORAL) 2 % shampoo Apply topically daily as needed prn at prn  Reported,  Patient   lisinopril-hydrochlorothiazide (ZESTORETIC) 20-25 MG tablet Take 1 tablet by mouth daily 5/23/2021 at pm  Milan Peace MD   Magnesium Oxide -Mg Supplement 250 MG tablet Take 1 tablet (250 mg) by mouth 2 times daily  Patient taking differently: Take 250 mg by mouth daily  5/23/2021 at pm  Majo Mckinnon MD   metFORMIN (GLUCOPHAGE-XR) 500 MG 24 hr tablet Take 2,000 mg by mouth daily (with dinner) 5/23/2021 at pm  Reported, Patient   metoprolol succinate ER (TOPROL-XL) 100 MG 24 hr tablet Take 1 tablet (100 mg) by mouth daily 5/23/2021 at pm  Milan Peace MD   montelukast (SINGULAIR) 10 MG tablet Take 1 tablet (10 mg) by mouth At Bedtime 5/23/2021 at pm  Kash Solano MD   Multiple Vitamin (DAILY-GERALD) TABS Take 1 tablet by mouth daily 5/23/2021 at pm  Majo Mckinnon MD   nitroGLYcerin (NITROSTAT) 0.4 MG sublingual tablet Place 1 tablet (0.4 mg) under the tongue every 5 minutes as needed for chest pain . After 3 doses, if pain persists call 911. prn at prn  Milan Peace MD   pravastatin (PRAVACHOL) 40 MG tablet Take 1 tablet (40 mg) by mouth daily 5/23/2021 at pm  Milan Peace MD   sennosides (SENOKOT) 8.6 MG tablet 1-2 tabs a day as needed for constipation prn at prn  Majo Mckinnon MD   spironolactone (ALDACTONE) 25 MG tablet Take 1 tablet (25 mg) by mouth daily TAKE 1 TABLET BY MOUTH EVERY DAY TAKE ADDITIONAL 1/2 TABLET BY MOUTH EVERY DAY AS NEEDED FOR WEIGHT GAIN 5/23/2021 at pm  Milan Peace MD       Date completed: 05/25/21    Medication history completed by: Bette Giang, PharmD, BCPS

## 2021-05-25 NOTE — H&P
"[facility]    History and Physical - Hospitalist Service, Gold Night       Date of Admission:  5/24/2021    Assessment & Plan Janine Cornell is a 55 year old female admitted on 5/24/2021. 56 yo F with PCOS, Chronic Idiopathic Pancreatitis, GPA, HFrEF (35%),CAD on DAPT, HTN, HLP, T2DM, Fibromyalgia. To [facility] ED with eipgastric pain and intolerance to PO for several weeks worse with eating. Lipase ~400. CT showing pancreatic head inflammation c/w pancreatitis. Last seen by Dr. Marcus in 2009 for chronic pancreatitis.    Acute on Chronic Idiopathic Pancreatitis - epigastric pain and imaging positive. Has had an extensive workup of her chronic postprandial abdominal pain, bloating, and early satiety including gastric emptying study (3/09) and MRI/MRCP (3/09) in addition to EGD (10/08) and endoscopic ultrasound (11/08).  MRCP was suggestive of idiopathic chronic pancreatitis. Unable to take Creon due to \"allergy to pork in the capsules\"  History of Cholecystectomy  - Panc/Bili GI Consult, last seen by Dr. Marcus 2009  - Clear liquid diet  - MRI pancreas (with secretin)  - IVF LR at 100 ml / hr x 10 hours (1L)  - hold tramadol in lieu of dilauded    CAD s/p PCI to LAD,D2 and Prox RCA 11/2/11  History of NSTEMI  Ischemic Cardiomyopathy, HF with recovered EF - EF 35-40% 10/31/11, Echo on 7/2018 normal  Recent episode of atypical chest pain.   - continue PTA DAPT, pravastatin 40 mg daily  - Metoprolol succinate 100 mg po daily  - ACEi as part of antihypertensive combo pill noted below  - spironolactone 25 mg daily  - Nitroglycerine as home med, will order if having anginal symptoms  - Echo given recent episode of chest pain, likely would benefit from an outpatient cardiology eval.     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 Shah and Dr. Valdez at [facility]. 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  Type 2 Diabetes Mellitus - hold home metformin, medium resistance sliding scale insulin  Hypertension - continue Lisinopril-hydrochlorothiazide (20-25 mg daily),   Hyperlipidemia - statin as above  Iron Deficiency Anemia - on iron replacement  Vitamin D deficiency - continue VitD replacement  Polycystic Ovarian Syndrome - hold metformin, treat BG as above  Xerophthalmia, OD - continue Restasis   Fibromyalgia / IBS - hold dicyclomine     Diet:   NPO  DVT Prophylaxis: Enoxaparin (Lovenox) SQ  Sanchez Catheter: not present  Code Status:   Full Code         Disposition Plan   Expected discharge: 2 - 3 days, recommended to prior living arrangement once adequate pain management/ tolerating PO medications.  Entered: Charles Parker DO 05/25/2021, 7:45 AM     The patient's care was discussed with the Bedside Nurse, Patient and GI Consultant.    Charles Parker DO  [facility]  Contact information available via [facility] Paging/Directory  Please see sign in/sign out for up to date coverage information    ______________________________________________________________________    Chief Complaint   Abdominal Pain and Intolerance to Oral intake.    History is obtained from the patient    History of Present Illness   Janine Cornell is a 55 year old female admitted on 5/24/2021. 56 yo F with PCOS, Chronic Idiopathic Pancreatitis, GPA diagnosed on Right Orbital Mass resection, HFrecovered EF (35% in 2011 normalized in 2018),CAD on DAPT, HTN, HLP, T2DM, Fibromyalgia. To [facility] ED with eipgastric pain  and intolerance to PO for several weeks worse with eating. Lipase ~400. CT showing pancreatic head inflammation c/w pancreatitis. Last seen by Dr. Marcus in 2009 for chronic pancreatitis.    At bedside patient states she was feeling well until approximately 3 weeks ago when she noticed worsening abdominal pain.  It is sharp, bandlike across the upper abdomen, with associated nausea.  Worse with eating.  Better slightly with pepto bismol.  She hasn't had pain like this in several years.      Additionally she notes that she had several episodes of substernal sharp chest pain approximately 4-6 weeks ago.  They lasted minutes and resolved with nitroglycerin.  They were not associated with activity, and she does not have symptoms associated with activity, however she has never had pain that came that frequently or that strong.  She does follow with cardiology and takes her medications regularly.     Review of Systems    The 10 point Review of Systems is negative other than noted in the HPI or here.     Past Medical History    I have reviewed this patient's medical history and updated it with pertinent information if needed.   Past Medical History:   Diagnosis Date     Abnormal glandular Papanicolaou smear of cervix 1992     Allergic rhinitis     Allergy, airborne subst     Arthritis      ASCVD (arteriosclerotic cardiovascular disease)      Chronic pain      Chronic pancreatitis (H)     idiopathic, Tx: PPI, antioxidants, pancreatic enzymes     Common migraine      Coronary artery disease      Costochondritis      Difficulty in walking(719.7)      Dyspnea on exertion      Ectasia, mammary duct     [facility], [facility] n[facility] discharge     Elevated fasting glucose      Gastro-oesophageal reflux disease      Hernia      History of angina      Hyperlipidemia LDL goal < 100      Hypertension goal BP (blood pressure) < 140/90     Essential hypertension     Iron deficiency anemia      Ischemic cardiomyopathy       Menorrhagia      Migraine headaches      Mild persistent asthma      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 Matias, neg w/u, Rx'd w/prednisone     Ventral hernia, unspecified, without mention of obstruction or gangrene 2012       Past Surgical History   I have reviewed this patient's surgical history and updated it with pertinent information if needed.  Past Surgical History:   Procedure Laterality Date     C ECHO HEART XTHORACIC,COMPLETE, W/O DOPPLER  04    [facility] WNL, EF 60%     C/SECTION, LOW TRANSVERSE           CARDIAC SURGERY      cardiac stent- recent in 16; 4 other stents     DILATION AND CURETTAGE N/A 2016    Procedure: DILATION AND CURETTAGE;  Surgeon: Nahed Butler MD;  Location: [facility] OR     HC UGI ENDOSCOPY W EUS  08    Dr. Pastrana, possible chronic pancreatitis, fatty liver     HERNIA REPAIR  2012    done at [facility]     INSERT INTRAUTERINE DEVICE N/A 2016    Procedure: INSERT INTRAUTERINE DEVICE;  Surgeon: Nahed Butler MD;  Location: [facility] OR     INT UTERINE FIBRIOD EMBOLIZATION  10/29/2014     LAPAROSCOPIC CHOLECYSTECTOMY  08    Dr. Arnol GRUBBS TX, CERVICAL      s/p LEEP     ORBITOTOMY Right 3/15/2016    Procedure: ORBITOTOMY;  Surgeon: Myron Cyr MD;  Location:  [facility]     ORBITOTOMY  Right 2017    Procedure: ORBITOTOMY;  RIGHT ORBITOTOMY AND BIOPSY;  Surgeon: Charis Holbrook MD;  Location:  [facility]     REPAIR PTOSIS Right 2017    Procedure: REPAIR PTOSIS;  RIGHT UPPER LID PTOSIS REPAIR;  Surgeon: Myron Cyr MD;  Location: [facility]     UPPER GI ENDOSCOPY  10/21/08    mild gastritis, Dr. Hidalgo       Social History   I have reviewed this patient's social history and updated it with pertinent information if needed.  Social History     Tobacco Use     Smoking status: Current Every Day Smoker     Packs/day: 0.20     Years: 1.00     Pack years: 0.20     Types: Cigarettes     Last attempt to quit: 2016     Years since quittin.3     Smokeless tobacco: Never Used   Substance Use Topics     Alcohol use: No     Alcohol/week: 0.0 standard drinks     Drug use: No       Family History   I have reviewed this patient's family history and updated it with pertinent information if needed.  Family History   Problem Relation Age of Onset     Heart Disease Father 50        heart attack     Cerebrovascular Disease Father      Diabetes Father      Hypertension Father      Depression Father      C.A.D. Father      Hypertension Mother      Arthritis Mother      Heart Disease Mother         long qt syndrome     Thyroid Disease Mother      C.A.D. Mother      Heart Disease Brother 15        MI at 15, 16.      Diabetes Maternal Uncle      Hypertension Maternal Aunt      Hypertension Maternal Uncle      Arthritis Brother         he passed away, had severe arthritis at age 11     Arthritis Maternal Uncle      Eye Disorder Maternal Uncle         cataracts     Neurologic Disorder Sister         migraines     Neurologic Disorder Sister         migraines     Respiratory Son         asthma     Cerebrovascular Disease Maternal Uncle      C.A.D. Brother      Family History Negative Other         neg for RA, SLE     Unknown/Adopted No family hx of         unknown neurological issues in her family,  mother was adopted     Skin Cancer No family hx of         No known family hx of skin cancer       Prior to Admission Medications   Prior to Admission Medications   Prescriptions Last Dose Informant Patient Reported? Taking?   ASPIRIN LOW DOSE 81 MG EC tablet   No No   Sig: TAKE 1 TABLET(81 MG) BY MOUTH DAILY   Blood Pressure Monitor KIT   No No   Si each daily Monitor home blood pressure as instructed by physician.  Dispense [place] blood pressure kit.   COMPRESSION STOCKINGS   No No   Si each daily Apply one 10-15 mmHg compression stocking to each lower extgmierty during the day and remove before bedtime.   EPIPEN 2-RIKY 0.3 MG/0.3ML injection   Yes No   Sig: INJECT 0.3 MG INTO THE MUSCLE PRF ANAPHYALAXIS   Magnesium Oxide -Mg Supplement 250 MG tablet   No No   Sig: Take 1 tablet (250 mg) by mouth 2 times daily   Multiple Vitamin (DAILY-GERALD) TABS   No No   Sig: Take 1 tablet by mouth daily   Triamcinolone Acetonide (NASACORT ALLERGY 24HR NA)   Yes No   acetaminophen (TYLENOL) 325 MG tablet   No No   Sig: Take 1-2 tablets (325-650 mg) by mouth every 6 hours as needed for pain (headache)   albuterol (2.5 MG/3ML) 0.083% neb solution   Yes No   Sig: INL 1 VIAL VIA NEBULIZATION Q 4 TO 6 HOURS PRN   albuterol (PROAIR HFA, PROVENTIL HFA, VENTOLIN HFA) 108 (90 BASE) MCG/ACT inhaler  Self No No   Sig: Inhale 2 puffs into the lungs every 6 hours as needed for shortness of breath / dyspnea or wheezing   blood glucose monitoring (NO BRAND SPECIFIED) meter device kit  Self No No   Sig: Use to test blood sugar 4 X times daily or as directed.   Patient taking differently: 1 kit Use to test blood sugar 4 X times daily or as directed.   blood glucose monitoring (NO BRAND SPECIFIED) test strip  Self No No   Si strip by In Vitro route 4 times daily Test as directed. Please dispense three months and three months of refills. Thank you.   Patient taking differently: 1 strip by In Vitro route 4 times daily Test as directed.  Please dispense three months and three months of refills. Thank you.   calcium carbonate (TUMS) 500 MG chewable tablet   No No   Sig: Take 3-4 tablets (1,500-2,000 mg) by mouth daily as needed   clopidogrel (PLAVIX) 75 MG tablet   No No   Sig: Take 1 tablet (75 mg) by mouth daily   cycloSPORINE (RESTASIS) 0.05 % ophthalmic emulsion   Yes No   Sig: Place 1 drop into the right eye every 12 hours   cyclobenzaprine (FLEXERIL) 10 MG tablet   No No   Sig: Take 1 tablet (10 mg) by mouth 2 times daily as needed for muscle spasms   desonide (DESOWEN) 0.05 % cream  Self Yes No   Sig: Apply topically 2 times daily   dicyclomine (BENTYL) 20 MG tablet   No No   Sig: TAKE 1 TABLET(20 MG) BY MOUTH FOUR TIMES DAILY AS NEEDED. Pls schedule clinic appt for refills.   diphenhydrAMINE (BENADRYL) 25 MG capsule   No No   Sig: Take 1 capsule (25 mg) by mouth nightly as needed for itching or allergies   ferrous gluconate (FERGON) 324 (38 Fe) MG tablet   No No   Sig: Take 1 tablet (324 mg) by mouth 2 times daily (with meals) DO NOT CRUSH. Absorbed best on an empty stomach. If stomach upset occurs, can take with meals. For additional refills, please schedule a follow-up appointment with Dr Solano at [phone]   fexofenadine (ALLEGRA) 180 MG tablet  Self No No   Sig: Take 1 tablet by mouth daily as needed.   fluocinolone (SYNALAR) 0.01 % solution   Yes No   Sig: Apply topically daily as needed   fluocinonide (LIDEX) 0.05 % external ointment   Yes No   Sig: Apply topically as needed   fluticasone-vilanterol (BREO ELLIPTA) 100-25 MCG/INH inhaler   Yes No   Sig: Inhale 1 puff into the lungs daily   folic acid (FOLVITE) 1 MG tablet   No No   Sig: Take 1 tablet by mouth daily   hydroxychloroquine (PLAQUENIL) 200 MG tablet   No No   Sig: Take 2 tablets (400 mg) by mouth daily (Annual eye exam/plaquenil screening)   insulin pen needle (BD MARCK U/F) 32G X 4 MM  Self No No   Sig: USE DAILY OR AS DIRECTED   ketoconazole (NIZORAL) 2 % shampoo  Self  Yes No   Sig: Apply topically daily as needed   lidocaine (LMX4) 4 % external cream   No No   Sig: Apply topically once as needed for mild pain (prn pain)   lisinopril-hydrochlorothiazide (ZESTORETIC) 20-25 MG tablet   No No   Sig: Take 1 tablet by mouth daily   metFORMIN (GLUCOPHAGE-XR) 500 MG 24 hr tablet   Yes No   Sig: Take 2,000 mg by mouth daily (with dinner)   metoprolol succinate ER (TOPROL-XL) 100 MG 24 hr tablet   No No   Sig: Take 1 tablet (100 mg) by mouth daily   metroNIDAZOLE (NORITATE) 1 % cream  Self Yes No   Sig: Apply topically daily   montelukast (SINGULAIR) 10 MG tablet  Self No No   Sig: Take 1 tablet (10 mg) by mouth At Bedtime   nitroGLYcerin (NITROSTAT) 0.4 MG sublingual tablet   No No   Sig: Place 1 tablet (0.4 mg) under the tongue every 5 minutes as needed for chest pain . After 3 doses, if pain persists call 911.   nystatin (MYCOSTATIN) 613055 UNITS TABS tablet   Yes No   Sig: TK 2 TS PO BID   pravastatin (PRAVACHOL) 40 MG tablet   No No   Sig: Take 1 tablet (40 mg) by mouth daily   sennosides (SENOKOT) 8.6 MG tablet   No No   Si-2 tabs a day as needed for constipation   spironolactone (ALDACTONE) 25 MG tablet   No No   Sig: Take 1 tablet (25 mg) by mouth daily TAKE 1 TABLET BY MOUTH EVERY DAY TAKE ADDITIONAL 1/2 TABLET BY MOUTH EVERY DAY AS NEEDED FOR WEIGHT GAIN   traMADol (ULTRAM) 50 MG tablet   No No   Sig: Take 1 tablet (50 mg) by mouth every 8 hours as needed for moderate pain   vitamin D2 (ERGOCALCIFEROL) 60266 units (1250 mcg) capsule   No No   Sig: Take 1 capsule (50,000 Units) by mouth every 7 days      Facility-Administered Medications: None     Allergies   Allergies   Allergen Reactions     Amoxicillin-Pot Clavulanate      Augmentin      Unknown possible hives and edema     Azithromycin      Diatrizoate Other (See Comments)     Pt wants this listed because she is allergic to shellfish      Imitrex [Sumatriptan]      Severe face/neck/chest tightness and flushing side effects       Penicillins Hives     Unknown      Pork Allergy      Stomach pain, cramping, diarrhea, itching, nausea and headaches     Shellfish Allergy Hives and Swelling     Sulfa Drugs Hives and Swelling     Zithromax [Azithromycin Dihydrate] Swelling     Unknown        Physical Exam   Vital Signs: Temp: 97.7  F (36.5  C) Temp src: Oral BP: (!) 141/82 Pulse: 76   Resp: 16 SpO2: 96 % O2 Device: None (Room air)    Weight: 165 lbs 0 oz    General Appearance: NAD  Eyes: PERRL, EOMI  HEENT:Moist, oropharynx clear  Respiratory: CTA b/l  Cardiovascular: RRR S1/S2, no m,r,g  GI: soft, tender, +BS  Skin: bruising on the right arm  Musculoskeletal: no active arthritis appearing currently.  Pain along the anterior right forearm  Neurologic: CN II-XII grossly intact  Psychiatric: A&Ox3    Data   Data reviewed today: I reviewed all medications, new labs and imaging results over the last 24 hours. I personally reviewed no images or EKG's today.    Recent Labs   Lab 05/24/21 2135   WBC 13.5*   HGB 13.9   MCV 78         POTASSIUM 3.3*   CHLORIDE 100   CO2 26   BUN 19   CR 0.92   ANIONGAP 10   KATHY 9.9   *   ALBUMIN 4.3   PROTTOTAL 8.0   BILITOTAL 0.3   ALKPHOS 104   ALT 30   AST 15   LIPASE 407*   TROPI <0.015     Most Recent 3 CBC's:  Recent Labs   Lab Test 05/24/21 2135 05/07/21  1508 10/27/20  1620   WBC 13.5* 8.9 9.5   HGB 13.9 12.7 12.7   MCV 78 81 79    336 394     Most Recent 3 BMP's:  Recent Labs   Lab Test 05/24/21 2135 05/07/21  1508 10/27/20  1620 07/02/20  0828     --  138 138   POTASSIUM 3.3*  --  3.7 3.3*   CHLORIDE 100  --  105 105   CO2 26  --  26 25   BUN 19  --  20 35*   CR 0.92 0.96 1.13* 1.47*   ANIONGAP 10  --  7 8   KATHY 9.9  --  9.4 9.5   *  --  148* 150*     Most Recent 2 LFT's:  Recent Labs   Lab Test 05/24/21 2135 05/07/21  1508 10/27/20  1620   AST 15 18 35   ALT 30 28 42   ALKPHOS 104  --  79   BILITOTAL 0.3  --  0.4     Most Recent 3 INR's:  Recent Labs   Lab Test  08/25/16  1713 05/20/15  1543 10/29/14  1018   INR 0.97 0.98 0.99     Recent Results (from the past 24 hour(s))   Abdomen US, limited (RUQ only)    Narrative    EXAM: US ABDOMEN LIMITED  LOCATION: [facility]  DATE/TIME: 5/24/2021 10:43 PM    INDICATION: Right upper quadrant pain after eating  COMPARISON: CT abdomen and pelvis 10/30/2018  TECHNIQUE: Limited abdominal ultrasound.    FINDINGS:    GALLBLADDER: Cholecystectomy.     BILE DUCTS: No biliary dilatation. The common duct measures 3 mm.    LIVER: Normal parenchyma is echogenic consistent with fatty infiltration. No focal mass.    RIGHT KIDNEY: No hydronephrosis.    PANCREAS: The parenchyma is hypoechoic with slightly lobular contour.    No ascites.      Impression    IMPRESSION:  1.  Cholecystectomy.  2.  Fatty liver.  3.  Hypoechoic and slightly lobular contour to the pancreas. No definite mass. CT recommended for further evaluation.       Abd/pelvis CT no contrast - Stone Protocol    Narrative    EXAM: CT ABDOMEN PELVIS W/O CONTRAST  LOCATION: [facility]  DATE/TIME: 5/25/2021 12:47 AM    INDICATION: Nausea/vomiting  COMPARISON: Limited abdominal ultrasound yesterday. CT abdomen and pelvis 10/30/2018.  TECHNIQUE: CT scan of the abdomen and pelvis was performed without IV contrast. Multiplanar reformats were obtained. Dose reduction techniques were used.  CONTRAST: None.    FINDINGS:   LOWER CHEST: Advanced coronary artery calcification incompletely imaged.    HEPATOBILIARY: Postcholecystectomy. Normal noncontrast liver and bile ducts.    PANCREAS: Pancreatic head appears mildly enlarged and slightly inflamed, findings suggesting acute pancreatitis. A pancreatic mass is not reliably visualized by noncontrast technique. Normal caliber pancreatic duct.    SPLEEN: Normal.    ADRENAL GLANDS: Normal.    KIDNEYS/BLADDER: Normal.    BOWEL: Mild colonic diverticulosis. No obstruction or inflammation. Normal appendix. No free fluid or  gas.    LYMPH NODES: Normal.    VASCULATURE: Mild aortoiliac atherosclerosis.    PELVIC ORGANS: Calcified uterine fibroids.    MUSCULOSKELETAL: Degenerative changes of the spine.      Impression    IMPRESSION:   1.  Pancreatic head appears mildly enlarged and slightly inflamed, findings suggesting acute pancreatitis. Clinical correlation recommended. A pancreatic mass is not visualized by noncontrast technique. If this is of concern, a follow-up scan with   contrast could be performed.  2.  Fibroid uterus.  3.  Advanced coronary artery calcification incompletely imaged.  4.  Postcholecystectomy.  5.  Mild colonic diverticulosis.     Echo Complete    Narrative    [id number]  [id number]  [id number]  [id number]^LEEROY^ANNE MARIE^EVAN     [facility]  Echocardiography Laboratory  [address] [place], [place] [address]     Name: FAVIO MARTNIEZ  MRN: [id number]  : 1965  Study Date: 2021 10:24 AM  Age: 55 yrs  Gender: Female  Patient Location: [facility] [place] [place]  Reason For Study: Angina Pectoris  Ordering Physician: ANNE MARIE UMAÑA  Performed By: Ernesto Diaz     BSA: 1.8 m2  Height: 63 in  Weight: 165 lb  HR: 95  BP: 141/82 mmHg  ______________________________________________________________________________  Procedure  Complete Portable Echo Adult. Echocardiogram with two-dimensional, color and  spectral Doppler performed.  ______________________________________________________________________________  Interpretation Summary  Global and regional left ventricular function is normal with an EF of 60-65%.  Global right ventricular function is normal.  No significant valvular abnormalities present.  The inferior vena cava was normal in size with preserved respiratory  variability.  No pericardial effusion is present.     ______________________________________________________________________________  Left Ventricle  Left ventricular size is normal. Left ventricular wall thickness is  normal.  Global and regional left ventricular function is normal with an EF of 60-65%.  Left ventricular diastolic function is normal. No regional wall motion  abnormalities are seen.     Right Ventricle  The right ventricle is normal size. Global right ventricular function is  normal.     Atria  Both atria appear normal.     Mitral Valve  The mitral valve is normal.     Aortic Valve  Aortic valve is normal in structure and function.     Tricuspid Valve  The tricuspid valve is normal. Trace tricuspid insufficiency is present. The  peak velocity of the tricuspid regurgitant jet is not obtainable. Pulmonary  artery systolic pressure cannot be assessed.     Pulmonic Valve  The pulmonic valve is normal.     Vessels  The aorta root is normal. The inferior vena cava was normal in size with  preserved respiratory variability.     Pericardium  No pericardial effusion is present.     Miscellaneous  No significant valvular abnormalities present.     Compared to Previous Study  This study was compared with the study from 7.11.19 . No significant changes  noted.  ______________________________________________________________________________  MMode/2D Measurements & Calculations  IVSd: 0.92 cm  LVIDd: 4.0 cm  LVIDs: 2.2 cm  LVPWd: 1.2 cm  FS: 44.5 %  LV mass(C)d: 137.1 grams  LV mass(C)dI: 76.9 grams/m2  asc Aorta Diam: 3.1 cm  LVOT diam: 1.9 cm  LVOT area: 2.9 cm2  LA Volume Index (BP): 32.0 ml/m2     RWT: 0.58  TAPSE: 2.5 cm     Doppler Measurements & Calculations  MV E max ash: 85.2 cm/sec  MV A max ash: 107.6 cm/sec  MV E/A: 0.79  MV dec slope: 473.1 cm/sec2  MV dec time: 0.18 sec     ______________________________________________________________________________  Report approved by: Lis Haines 05/25/2021 12:14 PM

## 2021-05-25 NOTE — ED NOTES
Sepsis BPA alert has fired and lactate was ordered Yes   Dr. Gomez was informed. Lactate was ordered.   Vitals 24 hr (last day)     Date/Time Temp Resp Pulse BP    05/24/21 2029  97.7 (36.5)  20  116  (!) 139/96            WBC   Date Value Ref Range Status   05/24/2021 13.5 (H) 4.0 - 11.0 10e9/L Final

## 2021-05-25 NOTE — ED NOTES
"Pt states she doesn't want a blood draw unless absolutely needed and mentioned how \"dry\" she is with the vomiting.  However states she doesn't want to get \"poked\" at all.  MD notified lactic acid was not drawn earlier in patient care.   "

## 2021-05-25 NOTE — PROGRESS NOTES
[facility]  Transfer Triage Note    Date of call: 05/25/21  Time of call: 4:46 AM    Is pandemic COVID-19 a concern? NO    Reason for transfer: Patient has established care here  Further diagnostic work up, management, and consultation for specialized care   Diagnosis: Intolerance to Oral Intake and Chronic Pancreatitis    Outside Records: Available  Additional records requested to be faxed to [phone].    Stability of Patient: Patient is vitally stable, with no critical labs, and will likely remain stable throughout the transfer process  ICU: No    Expected Time of Arrival for Transfer: 0-8 hours    Arrival Location:  [address] [address] Phone: [phone]    Recommendations for Management and Stabilization: Not needed    Additional Comments     56 yo F with PCOS, Chronic Idiopathic Pancreatitis, GPA, ICM, HTN, HLP, T2DM, CAD, Fibromyalgia. To [facility] with eipgastric pain and intolerance to PO for several weeks worse with eating. Lipase ~400. CT showing pancreatic head inflammation c/w pancreatitis. Last seen by Dr. Marcus in 2009 for chronic pancreatitis.    Charles Parker DO

## 2021-05-25 NOTE — CONSULTS
Gastroenterology Consultation  Janine Cornell [id number] 55 year old 1965 5/25/2021        Date of Admission: 5/24/2021  Requesting physician: Charles Francois DO       Reason for Consultation:   acute on chronic pancreatitis, last seen by Dr Marcus in 2009         Assessment and Plan:   Janine Cornell is a 55 year old female with PMHx of HFrEF (35%), CAD on DAPT, granulomatosis with polyangiitis, PCOS, fibromyalgia and chronic pancreatitis who presents with acute on chronic pain.     #. Acute on Chronic Abdominal Pain  #. Acute on Chronic Pancreatitis   BISAP score: 0  Patient presented with acute pain epigastric pain for 2 weeks prior to admission w/nausea and emesis. Known history of chronic pancreatitis with MR secretin scan 2009 suggestive of chronic pancreatitis. EUS 2008 without ductal abnormalities, but pancreas with appearance consistent with chronic pancreatitis. CT w/o contrast on admission with mild enlargement around pancreatic head w/slight inflammation; no contrast was given due to patient preference. Given 2 out of 3 criteria (epigastric abdominal + imaging findings), meets acute on chronic pancreatitis criteria. Patient with ongoing tobacco use, which may contribute to acute on chronic pancreatitis. Patient denies alcohol use. S/p cholecystectomy.          Recommendations:   - F/u PETH  - Recommend MRI with secretin to further evaluate acute on chronic pancreatitis   - NPO for treatment of pancreatitis   * IV fluids while NPO for treatment of acute on chronic pancreatitis   * As pain improves, can advance diet as tolerated  - Recommend adding standing miralax qdaily in addition to senna BID  - Analgesics per primary team; recommend PO options initially and then offering IV options when PO is ineffective  - Anti-emetics & electrolyte repletion per primary team  - Recommend outpatient tobacco cessation follow up     It has been a pleasure to participate in the care of this patient.  Patient  discussed with GI staff, Dr. Epstein. Please do not hesitate to contact the GI service with any questions or concerns.     Jennifer Horan (Lizzie)  Gastroenterology Fellow  Pager [phone]         HPI:   Janine Cornell is a 55 year old female with PMHx of HFrEF (35%), CAD on DAPT, granulomatosis with polyangiitis, PCOS, fibromyalgia and chronic pancreatitis who presents with acute on chronic pain.     - 11/2008: EUS done, suggestive of chronic pancreatitis, no ductal abnormalities; see below  - 3/2009: Last seen by Dr. Marcus, thought to have probably idiopathic chronic pancreatitis.   - 8/30/2013: The advanced GI department reached out to her, she was not interested in follow up at that time.    Currently:  - Patient mentions that for the last two weeks she has been having epigastric and RUQ abdominal pain associated with nausea and emesis; unclear trigger two weeks ago. The pain is worse after eating, radiates to the back. She has been having nausea for > 2 weeks, but unclear timeline.   - She has lost weight in the last 2 weeks due to poor appetite, 5-10 lbs  - She has not seen any GI doctor since 2009 when she saw Dr. Marcus  - Denies any fevers or chills  - She mentions that she is chronically constipated. Denies melena or hematochezia.   - Ongoing tobacco use, denies alcohol.          Past Medical History:     Past Medical History:   Diagnosis Date     Abnormal glandular Papanicolaou smear of cervix 1992     Allergic rhinitis     Allergy, airborne subst     Arthritis      ASCVD (arteriosclerotic cardiovascular disease)      Chronic pain      Chronic pancreatitis (H)     idiopathic, Tx: PPI, antioxidants, pancreatic enzymes     Common migraine      Coronary artery disease      Costochondritis      Difficulty in walking(719.7)      Dyspnea on exertion      Ectasia, mammary duct     [facility], persistent nipple discharge     Elevated fasting glucose      Gastro-oesophageal reflux disease       Hernia      History of angina      Hyperlipidemia LDL goal < 100      Hypertension goal BP (blood pressure) < 140/90     Essential hypertension     Iron deficiency anemia      Ischemic cardiomyopathy      Menorrhagia      Migraine headaches      Mild persistent asthma      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 Matias, neg w/u, Rx'd w/prednisone     Ventral hernia, unspecified, without mention of obstruction or gangrene 2012          Past Surgical History:     Past Surgical History:   Procedure Laterality Date     C ECHO HEART XTHORACIC,COMPLETE, W/O DOPPLER  04    [facility] WNL, EF 60%     C/SECTION, LOW TRANSVERSE           CARDIAC SURGERY      cardiac stent- recent in 16; 4 other stents     DILATION AND CURETTAGE N/A 2016    Procedure: DILATION AND CURETTAGE;  Surgeon: Nahed Butler MD;  Location: [facility] OR     HC UGI ENDOSCOPY W EUS  08    Dr. Pastrana, possible chronic pancreatitis, fatty liver     HERNIA REPAIR  2012    done at [facility]     INSERT INTRAUTERINE DEVICE N/A 2016    Procedure: INSERT INTRAUTERINE DEVICE;  Surgeon: Nahed Butler MD;  Location: [facility] OR     INT UTERINE FIBRIOD EMBOLIZATION  10/29/2014     LAPAROSCOPIC CHOLECYSTECTOMY  08    Dr. Arnol GRUBBS TX, CERVICAL      s/p LEEP     ORBITOTOMY Right  3/15/2016    Procedure: ORBITOTOMY;  Surgeon: Myron Cyr MD;  Location: [facility]     ORBITOTOMY Right 8/4/2017    Procedure: ORBITOTOMY;  RIGHT ORBITOTOMY AND BIOPSY;  Surgeon: Charis Holbrook MD;  Location: [facility]     REPAIR PTOSIS Right 11/17/2017    Procedure: REPAIR PTOSIS;  RIGHT UPPER LID PTOSIS REPAIR;  Surgeon: Myron Cyr MD;  Location: [facility]     UPPER GI ENDOSCOPY  10/21/08    mild gastritis, Dr. Hidalgo          Medications:     Current Facility-Administered Medications   Medication     albuterol (PROAIR HFA/PROVENTIL HFA/VENTOLIN HFA) 108 (90 Base) MCG/ACT inhaler 2 puff     albuterol (PROVENTIL) neb solution 2.5 mg     aspirin EC tablet 81 mg     calcium carbonate (TUMS) chewable tablet 1,000 mg     clopidogrel (PLAVIX) tablet 75 mg     cyclobenzaprine (FLEXERIL) tablet 10 mg     cycloSPORINE (RESTASIS) 0.05 % ophthalmic emulsion 1 drop     glucose gel 15-30 g    Or     dextrose 50 % injection 25-50 mL    Or     glucagon injection 1 mg     enoxaparin ANTICOAGULANT (LOVENOX) injection 40 mg     ferrous gluconate (FERGON) tablet 324 mg     fexofenadine (ALLEGRA) tablet 180 mg     fluticasone (FLONASE) 50 MCG/ACT spray 2 spray     fluticasone-vilanterol (BREO ELLIPTA) 100-25 MCG/INH inhaler 1 puff     folic acid (FOLVITE) tablet 1 mg     lisinopril (ZESTRIL) tablet 20 mg    Or     hydrochlorothiazide (HYDRODIURIL) tablet 25 mg     HYDROmorphone (PF) (DILAUDID) injection 0.3-0.5 mg     hydroxychloroquine (PLAQUENIL) tablet 400 mg     insulin aspart (NovoLOG) injection (RAPID ACTING)     lactated ringers infusion     lidocaine (LMX4) cream     lidocaine (XYLOCAINE) 2 % 15 mL, alum & mag hydroxide-simethicone (MAALOX) 15 mL GI Cocktail     lidocaine 1 % 0.1-1 mL     magnesium oxide (MAG-OX) half-tab 200 mg     melatonin tablet 1 mg     metoprolol succinate ER (TOPROL-XL) 24 hr tablet 100 mg     montelukast (SINGULAIR) tablet 10 mg     naloxone (NARCAN) injection 0.2 mg    Or     naloxone  (NARCAN) injection 0.4 mg    Or     naloxone (NARCAN) injection 0.2 mg    Or     naloxone (NARCAN) injection 0.4 mg     nitroGLYcerin (NITROSTAT) sublingual tablet 0.4 mg     omeprazole (priLOSEC) CR capsule 20 mg     ondansetron (ZOFRAN-ODT) ODT tab 4 mg    Or     ondansetron (ZOFRAN) injection 4 mg     oxyCODONE (ROXICODONE) tablet 5 mg     polyethylene glycol (MIRALAX) Packet 17 g     pravastatin (PRAVACHOL) tablet 40 mg     prochlorperazine (COMPAZINE) injection 10 mg    Or     prochlorperazine (COMPAZINE) tablet 10 mg    Or     prochlorperazine (COMPAZINE) suppository 25 mg     senna-docusate (SENOKOT-S/PERICOLACE) 8.6-50 MG per tablet 1 tablet    Or     senna-docusate (SENOKOT-S/PERICOLACE) 8.6-50 MG per tablet 2 tablet     sodium chloride (PF) 0.9% PF flush 3 mL     sodium chloride (PF) 0.9% PF flush 3 mL     spironolactone (ALDACTONE) tablet 25 mg     vitamin D2 (ERGOCALCIFEROL) 74453 units (1250 mcg) capsule 50,000 Units          Allergies     Allergies   Allergen Reactions     Amoxicillin-Pot Clavulanate      Augmentin      Unknown possible hives and edema     Azithromycin      Diatrizoate Other (See Comments)     Pt wants this listed because she is allergic to shellfish      Imitrex [Sumatriptan]      Severe face/neck/chest tightness and flushing side effects      Penicillins Hives     Unknown      Pork Allergy      Stomach pain, cramping, diarrhea, itching, nausea and headaches     Shellfish Allergy Hives and Swelling     Sulfa Drugs Hives and Swelling     Zithromax [Azithromycin Dihydrate] Swelling     Unknown             Social History:   - Single, lives alone; has an aide attendant at home  - Tobacco: ongoing use, will not specify how much   - Alcohol: denies  - Was not interested in providing any other social history        Family History:   Reviewed and edited as appropriate  Family History   Problem Relation Age of Onset     Heart Disease Father 50        heart attack     Cerebrovascular Disease  "Father      Diabetes Father      Hypertension Father      Depression Father      C.A.D. Father      Hypertension Mother      Arthritis Mother      Heart Disease Mother         long qt syndrome     Thyroid Disease Mother      C.A.D. Mother      Heart Disease Brother 15        MI at 15, 16.      Diabetes Maternal Uncle      Hypertension Maternal Aunt      Hypertension Maternal Uncle      Arthritis Brother         he passed away, had severe arthritis at age 11     Arthritis Maternal Uncle      Eye Disorder Maternal Uncle         cataracts     Neurologic Disorder Sister         migraines     Neurologic Disorder Sister         migraines     Respiratory Son         asthma     Cerebrovascular Disease Maternal Uncle      C.A.D. Brother      Family History Negative Other         neg for RA, SLE     Unknown/Adopted No family hx of         unknown neurological issues in her family, mother was adopted     Skin Cancer No family hx of         No known family hx of skin cancer          Review of Systems:   A complete 10 point review of systems was obtained.  Please see the HPI for pertinent positives and negatives.  All other systems were reviewed and were found to be negative.          Physical Exam:   VS:  BP (!) 141/82 (BP Location: Right arm)   Pulse 76   Temp 97.7  F (36.5  C) (Oral)   Resp 16   Ht 1.6 m (5' 3\")   Wt 74.8 kg (165 lb)   SpO2 96%   BMI 29.23 kg/m      Wt:   Wt Readings from Last 2 Encounters:   21 74.8 kg (165 lb)   05/10/21 77.1 kg (169 lb 14.4 oz)      Constitutional: Lying in bed, no acute distress.   Eyes: Conjunctiva anicteric  HEENT: Mask in place  CV: No Yara edema  Respiratory: Breathing comfortably on ambient air  Abd: Nondistended, tender to palpation in epigastric and RUQ area.  Skin: warm, perfused, no jaundice  Neuro: A&O x 3         Laboratory:   BMP  Recent Labs   Lab 21  2135      POTASSIUM 3.3*   CHLORIDE 100   KATHY 9.9   CO2 26   BUN 19   CR 0.92   * "     CBC  Recent Labs   Lab 05/24/21 2135   WBC 13.5*   RBC 5.39*   HGB 13.9   HCT 41.8   MCV 78   MCH 25.8*   MCHC 33.3   RDW 13.0        INRNo lab results found in last 7 days.  LFTs  Recent Labs   Lab 05/24/21 2135   ALKPHOS 104   AST 15   ALT 30   BILITOTAL 0.3   PROTTOTAL 8.0   ALBUMIN 4.3      PANC  Recent Labs   Lab 05/24/21 2135   LIPASE 407*     Imaging/Procedures/Studies:   CT Abdomen w/no Contrast 5/24/21:  1.  Pancreatic head appears mildly enlarged and slightly inflamed, findings suggesting acute pancreatitis. Clinical correlation recommended. A pancreatic mass is not visualized by noncontrast technique. If this is of concern, a follow-up scan with   contrast could be performed.  2.  Fibroid uterus.  3.  Advanced coronary artery calcification incompletely imaged.  4.  Postcholecystectomy.  5.  Mild colonic diverticulosis.    MR Secretin 3/2009  Impression:   1. Decreased signal intensity of pancreatic parenchyma on T1-weighted   images consistent with history of chronic pancreatitis.   2. No evidence for obstructing lesions within the pancreatic duct or   pancreas divisum.     EUS 11/2008:

## 2021-05-25 NOTE — ED NOTES
"PT making multiple c/o about care while this RN off unit. Pt c/o having to put call light on, getting an oral med instead of IV, and people laughing at the desk after she requested to have door closed due to a pt screaming and causing anxiety. Pt stated, \"Don't you medicate people around here?\" PT notes she does not want to wait here for hours for admit. Pt notified she has abed on 7c and report can be called after covid test resulted. Pt requests to speak to MD before doing test. MD notified.  "

## 2021-05-25 NOTE — ED NOTES
Pt informed of remaining oral meds for potassium replacement.  Pt was able to keep down some fluids ([place]) and states her nausea is improved at this time and wants to attempt to take the potassium orally with warm water.  Pt was able to take 1 of the 4 pills and states she wants to try to slowly take the others and will let RN know if unable to take remainder.

## 2021-05-25 NOTE — ED NOTES
EMLA cream placed on L hand pt declines having RN look anywhere else for an IV start and declines having EMLA cream placed on R hand.

## 2021-05-25 NOTE — ED NOTES
RN into attempt IV placed. EDT in room as well to take the patient to CT.  PT states she would like to wait on IV placement and go to CT first.

## 2021-05-25 NOTE — DISCHARGE INSTRUCTIONS
TODAY'S VISIT:  You were seen today for abdominal pain   -   - If you had any labs or imaging/radiology tests performed today, you should also discuss these tests with your usual provider.     FOLLOW-UP:  Please make an appointment to follow up with:  - Your Primary Care Provider. If you do not have a PCP, please call the Primary Care Center (phone: [phone] for an appointment    - Have your provider review the results from today's visit with you again to make sure no further follow-up or additional testing is needed based on those results.     RETURN TO THE EMERGENCY DEPARTMENT  Return to the Emergency Department at any time for any new or worsening symptoms or any concerns.

## 2021-05-25 NOTE — ED NOTES
The patient was accepted at shift change signout with a plan to reassess after the patient attempted a p.o. trial to see how she was doing.  It was hoped that she would be able to discharge home with oral medications if she was able to pass her p.o. trial.  I did reevaluate her a couple of times, and both times she said that she was really feeling pretty miserable, continued to have nausea and pain.  She did not feel she was able to discharge home, will be admitted for further management to internal medicine on the [facility].    Dictation Disclaimer: Some of this Note has been completed with voice-recognition dictation software. Although errors are generally corrected real-time, there is the potential for a rare error to be present in the completed chart.       Nieves Bullokc MD  05/25/21 041

## 2021-05-26 LAB
ALBUMIN SERPL-MCNC: 3.8 G/DL (ref 3.4–5)
ALP SERPL-CCNC: 87 U/L (ref 40–150)
ALT SERPL W P-5'-P-CCNC: 32 U/L (ref 0–50)
ANION GAP SERPL CALCULATED.3IONS-SCNC: 5 MMOL/L (ref 3–14)
AST SERPL W P-5'-P-CCNC: 24 U/L (ref 0–45)
BASOPHILS # BLD AUTO: 0.1 10E9/L (ref 0–0.2)
BASOPHILS NFR BLD AUTO: 0.6 %
BILIRUB SERPL-MCNC: 0.6 MG/DL (ref 0.2–1.3)
BUN SERPL-MCNC: 9 MG/DL (ref 7–30)
CALCIUM SERPL-MCNC: 9.1 MG/DL (ref 8.5–10.1)
CHLORIDE SERPL-SCNC: 108 MMOL/L (ref 94–109)
CO2 SERPL-SCNC: 27 MMOL/L (ref 20–32)
CREAT SERPL-MCNC: 0.95 MG/DL (ref 0.52–1.04)
DIFFERENTIAL METHOD BLD: ABNORMAL
EOSINOPHIL # BLD AUTO: 0.5 10E9/L (ref 0–0.7)
EOSINOPHIL NFR BLD AUTO: 5.2 %
ERYTHROCYTE [DISTWIDTH] IN BLOOD BY AUTOMATED COUNT: 13.3 % (ref 10–15)
GFR SERPL CREATININE-BSD FRML MDRD: 68 ML/MIN/{1.73_M2}
GLUCOSE BLDC GLUCOMTR-MCNC: 128 MG/DL (ref 70–99)
GLUCOSE BLDC GLUCOMTR-MCNC: 214 MG/DL (ref 70–99)
GLUCOSE BLDC GLUCOMTR-MCNC: 219 MG/DL (ref 70–99)
GLUCOSE BLDC GLUCOMTR-MCNC: 79 MG/DL (ref 70–99)
GLUCOSE BLDC GLUCOMTR-MCNC: 84 MG/DL (ref 70–99)
GLUCOSE BLDC GLUCOMTR-MCNC: 94 MG/DL (ref 70–99)
GLUCOSE SERPL-MCNC: 137 MG/DL (ref 70–99)
HCT VFR BLD AUTO: 35.7 % (ref 35–47)
HGB BLD-MCNC: 11.7 G/DL (ref 11.7–15.7)
IMM GRANULOCYTES # BLD: 0 10E9/L (ref 0–0.4)
IMM GRANULOCYTES NFR BLD: 0.2 %
LYMPHOCYTES # BLD AUTO: 1.8 10E9/L (ref 0.8–5.3)
LYMPHOCYTES NFR BLD AUTO: 20.7 %
MCH RBC QN AUTO: 25.9 PG (ref 26.5–33)
MCHC RBC AUTO-ENTMCNC: 32.8 G/DL (ref 31.5–36.5)
MCV RBC AUTO: 79 FL (ref 78–100)
MONOCYTES # BLD AUTO: 0.7 10E9/L (ref 0–1.3)
MONOCYTES NFR BLD AUTO: 8.3 %
NEUTROPHILS # BLD AUTO: 5.8 10E9/L (ref 1.6–8.3)
NEUTROPHILS NFR BLD AUTO: 65 %
NRBC # BLD AUTO: 0 10*3/UL
NRBC BLD AUTO-RTO: 0 /100
PLATELET # BLD AUTO: 320 10E9/L (ref 150–450)
POTASSIUM SERPL-SCNC: 3.3 MMOL/L (ref 3.4–5.3)
PROT SERPL-MCNC: 6.7 G/DL (ref 6.8–8.8)
RBC # BLD AUTO: 4.52 10E12/L (ref 3.8–5.2)
SODIUM SERPL-SCNC: 140 MMOL/L (ref 133–144)
WBC # BLD AUTO: 8.9 10E9/L (ref 4–11)

## 2021-05-26 PROCEDURE — 120N000002 HC R&B MED SURG/OB UMMC

## 2021-05-26 PROCEDURE — 250N000011 HC RX IP 250 OP 636: Performed by: STUDENT IN AN ORGANIZED HEALTH CARE EDUCATION/TRAINING PROGRAM

## 2021-05-26 PROCEDURE — 85025 COMPLETE CBC W/AUTO DIFF WBC: CPT | Performed by: STUDENT IN AN ORGANIZED HEALTH CARE EDUCATION/TRAINING PROGRAM

## 2021-05-26 PROCEDURE — 250N000013 HC RX MED GY IP 250 OP 250 PS 637: Performed by: HOSPITALIST

## 2021-05-26 PROCEDURE — 36415 COLL VENOUS BLD VENIPUNCTURE: CPT | Performed by: STUDENT IN AN ORGANIZED HEALTH CARE EDUCATION/TRAINING PROGRAM

## 2021-05-26 PROCEDURE — 250N000012 HC RX MED GY IP 250 OP 636 PS 637: Performed by: STUDENT IN AN ORGANIZED HEALTH CARE EDUCATION/TRAINING PROGRAM

## 2021-05-26 PROCEDURE — 99233 SBSQ HOSP IP/OBS HIGH 50: CPT | Performed by: INTERNAL MEDICINE

## 2021-05-26 PROCEDURE — 250N000009 HC RX 250: Performed by: HOSPITALIST

## 2021-05-26 PROCEDURE — 250N000009 HC RX 250: Performed by: STUDENT IN AN ORGANIZED HEALTH CARE EDUCATION/TRAINING PROGRAM

## 2021-05-26 PROCEDURE — 80053 COMPREHEN METABOLIC PANEL: CPT | Performed by: STUDENT IN AN ORGANIZED HEALTH CARE EDUCATION/TRAINING PROGRAM

## 2021-05-26 PROCEDURE — 250N000013 HC RX MED GY IP 250 OP 250 PS 637: Performed by: STUDENT IN AN ORGANIZED HEALTH CARE EDUCATION/TRAINING PROGRAM

## 2021-05-26 PROCEDURE — 250N000013 HC RX MED GY IP 250 OP 250 PS 637: Performed by: INTERNAL MEDICINE

## 2021-05-26 PROCEDURE — 999N001017 HC STATISTIC GLUCOSE BY METER IP

## 2021-05-26 PROCEDURE — 99232 SBSQ HOSP IP/OBS MODERATE 35: CPT | Mod: GC | Performed by: INTERNAL MEDICINE

## 2021-05-26 RX ORDER — POLYETHYLENE GLYCOL 3350 17 G/17G
17 POWDER, FOR SOLUTION ORAL DAILY
Status: DISCONTINUED | OUTPATIENT
Start: 2021-05-26 | End: 2021-05-28 | Stop reason: HOSPADM

## 2021-05-26 RX ADMIN — OXYCODONE HYDROCHLORIDE 5 MG: 5 TABLET ORAL at 03:21

## 2021-05-26 RX ADMIN — LIDOCAINE HYDROCHLORIDE 30 ML: 20 SOLUTION ORAL; TOPICAL at 23:03

## 2021-05-26 RX ADMIN — LIDOCAINE: 40 CREAM TOPICAL at 11:59

## 2021-05-26 RX ADMIN — CLOPIDOGREL BISULFATE 75 MG: 75 TABLET ORAL at 22:15

## 2021-05-26 RX ADMIN — PRAVASTATIN SODIUM 40 MG: 40 TABLET ORAL at 22:15

## 2021-05-26 RX ADMIN — OXYCODONE HYDROCHLORIDE 5 MG: 5 TABLET ORAL at 07:53

## 2021-05-26 RX ADMIN — DOCUSATE SODIUM 50 MG AND SENNOSIDES 8.6 MG 1 TABLET: 8.6; 5 TABLET, FILM COATED ORAL at 07:59

## 2021-05-26 RX ADMIN — FOLIC ACID 1 MG: 1 TABLET ORAL at 22:16

## 2021-05-26 RX ADMIN — HYDROMORPHONE HYDROCHLORIDE 0.3 MG: 1 INJECTION, SOLUTION INTRAMUSCULAR; INTRAVENOUS; SUBCUTANEOUS at 06:08

## 2021-05-26 RX ADMIN — Medication 200 MG: at 22:15

## 2021-05-26 RX ADMIN — OXYCODONE HYDROCHLORIDE 5 MG: 5 TABLET ORAL at 22:11

## 2021-05-26 RX ADMIN — METOPROLOL SUCCINATE 100 MG: 100 TABLET, EXTENDED RELEASE ORAL at 01:00

## 2021-05-26 RX ADMIN — ASPIRIN 81 MG: 81 TABLET, COATED ORAL at 22:15

## 2021-05-26 RX ADMIN — SPIRONOLACTONE 25 MG: 25 TABLET ORAL at 22:14

## 2021-05-26 RX ADMIN — OMEPRAZOLE 20 MG: 20 CAPSULE, DELAYED RELEASE ORAL at 07:52

## 2021-05-26 RX ADMIN — OMEPRAZOLE 20 MG: 20 CAPSULE, DELAYED RELEASE ORAL at 17:03

## 2021-05-26 RX ADMIN — INSULIN ASPART 2 UNITS: 100 INJECTION, SOLUTION INTRAVENOUS; SUBCUTANEOUS at 22:08

## 2021-05-26 RX ADMIN — POLYETHYLENE GLYCOL 3350 17 G: 17 POWDER, FOR SOLUTION ORAL at 17:02

## 2021-05-26 RX ADMIN — HYDROCHLOROTHIAZIDE 25 MG: 25 TABLET ORAL at 22:15

## 2021-05-26 RX ADMIN — HYDROXYCHLOROQUINE SULFATE 400 MG: 200 TABLET, FILM COATED ORAL at 22:15

## 2021-05-26 ASSESSMENT — ACTIVITIES OF DAILY LIVING (ADL)
ADLS_ACUITY_SCORE: 13

## 2021-05-26 NOTE — PLAN OF CARE
Time: 5175-4509    Reason for Admission: Epgastric pain and intolerance to PO for several weeks worse with eating.    Activity: Independent    Neuro: A&O x4. Calm and cooperative this shift.     GI/: Voiding spontaneously without difficulty. No BM this shift.    Diet: Clear Liquids, tolerating.     Incisions/Drains: None    IV Access: L PIV infusing TKO.     Labs: B    Vitals: Slightly hypertensive but within parameters. AOVSS on RA.    Pain: Pt reporting abdominal pain. PRN oxycodone given x2.     New changes this shift: Pt had MRI done this shift.     Plan: Continue with plan of care.

## 2021-05-26 NOTE — PLAN OF CARE
AVSS on room air. Continued to complain of abdominal pain - Oxycodone given x1 and using hot packs. Advanced to a regular diet, denied nausea. PIV saline locked. Up ad brandi. Voiding spontaneously. Passing gas. LBM 5/23. Senna and Miralax given. Patient requested EMLA cream prior to lab draw. PIV saline locked. Continue with plan of care.

## 2021-05-26 NOTE — CONSULTS
Care Management Initial Consult    General Information  Assessment completed with: Patient(wanted to rest and asked for me to talk with bedside RN, ), (patient, bedside RN and chart review.)     Primary Care Provider verified and updated as needed: Yes   Readmission within the last 30 days:        Reason for Consult: discharge planning  Advance Care Planning:       General Information Comments: (Ms Cornell usually follows up in the outpatient setting prn.)    Communication Assessment  Patient's communication style: (Speaks English)    Hearing Difficulty or Deaf: no   Wear Glasses or Blind: yes    Cognitive  Cognitive/Neuro/Behavioral: WDL                      Living Environment:   People in home:     Current living Arrangements: Private home in [place].  Able to return to prior arrangements:  Yes, patient is up and independent.     Current Resources:   Patient receiving home care services:  No     Community Resources:    Equipment currently used at home: none  Supplies currently used at home:  non        Financial Concerns: No concerns identified       Lifestyle & Psychosocial Needs: (From Chart Flow Sheet)        Socioeconomic History     Marital status: Single     Spouse name: Not on file     Number of children: 1     Years of education: Not on file     Highest education level: Not on file   Occupational History     Employer: NONE      Tobacco Use     Smoking status: Current Every Day Smoker     Packs/day: 0.20     Years: 1.00     Pack years: 0.20     Types: Cigarettes     Last attempt to quit: 2016     Years since quittin.3     Smokeless tobacco: Never Used   Substance and Sexual Activity     Alcohol use: No     Alcohol/week: 0.0 standard drinks     Drug use: No     Sexual activity: Not Currently       Functional Status:  Prior to admission patient needed assistance: No    Chemical Dependency Status: See above flow sheet.        Values/Beliefs:  Spiritual, Cultural Beliefs, Buddhism Practices, Values  that affect care: no               Additional Information:    Inpatient work up continues per MD team.  Care Management Team will continue to follow for transition needs prn.    BRIAN Lipscomb.S.N., R.N., P.H.N..  Care Coordinator     Pager   [facility]

## 2021-05-26 NOTE — PROGRESS NOTES
Gastroenterology Follow up Note       Assessment and Plan:   Janine Cornell is a 55 year old female with PMHx of HFrEF (35%), CAD on DAPT, granulomatosis with polyangiitis, PCOS, fibromyalgia and chronic pancreatitis who presents with acute on chronic pain.     #. Acute on Chronic Abdominal Pain  #. Acute on Chronic Pancreatitis   BISAP score: 0  Patient presented with acute pain epigastric pain for 2 weeks prior to admission w/nausea and emesis. Known history of chronic pancreatitis with MR secretin scan 2009 suggestive of chronic pancreatitis. EUS 2008 without ductal abnormalities, but pancreas with appearance consistent with chronic pancreatitis. CT w/o contrast on admission with mild enlargement around pancreatic head w/slight inflammation; no contrast was given due to patient preference. Given 2 out of 3 criteria (epigastric abdominal + imaging findings), meets acute on chronic pancreatitis criteria. Patient with ongoing tobacco use, which may contribute to acute on chronic pancreatitis. Patient denies alcohol use. S/p cholecystectomy.     MRI w/secretin on 5/26 - unable to interpret secretin component of the study because patient unable to complete the study. Patient with mild campos-pancreatic stranding on top chronic pancreatitis. There is evidence of pancreatic stricturing and a mass like abnormality in the pancreatic head.    Currently, ongoing abdominal pain. Able to tolerate minimal PO.          Recommendations:   - F/u PETH  - Advance diet as tolerated                * IV fluids as needed   - Analgesics, anti-emetics & electrolyte repletion per primary team  - Bowel regimen per primary team while on opioids  - Recommend outpatient tobacco cessation follow up     Outpatient:  - Plan for follow up with Dr. Epstein in 4 weeks to determine next steps, consider EUS and consideration of ERCP given MRI suggestive of pancreatic duct strictures; message sent to schedulers to assist    It has been a pleasure to  participate in the care of this patient.  Patient discussed with GI staff, Dr. Jean.  Please do not hesitate to contact the GI service with any questions or concerns.     Jennifer Horan (Lizzie)  Gastroenterology Fellow  Pager [phone]         Subjective/Objective:   - No acute events overnight  - No fevers or chills  - Ongoing abdominal pain, unable to state on a scale of 1-10, epigastric in area. Denies any associated nausea/emesis  - Had sherbet overnight, tolerated okay  - Denies any bowel movements in the last 24 hours          Medications:     Current Facility-Administered Medications   Medication     albuterol (PROAIR HFA/PROVENTIL HFA/VENTOLIN HFA) 108 (90 Base) MCG/ACT inhaler 2 puff     albuterol (PROVENTIL) neb solution 2.5 mg     aspirin EC tablet 81 mg     calcium carbonate (TUMS) chewable tablet 1,000 mg     clopidogrel (PLAVIX) tablet 75 mg     cyclobenzaprine (FLEXERIL) tablet 10 mg     glucose gel 15-30 g    Or     dextrose 50 % injection 25-50 mL    Or     glucagon injection 1 mg     enoxaparin ANTICOAGULANT (LOVENOX) injection 40 mg     fexofenadine (ALLEGRA) tablet 180 mg     folic acid (FOLVITE) tablet 1 mg     lisinopril (ZESTRIL) tablet 20 mg    Or     hydrochlorothiazide (HYDRODIURIL) tablet 25 mg     HYDROmorphone (PF) (DILAUDID) injection 0.3-0.5 mg     hydroxychloroquine (PLAQUENIL) tablet 400 mg     insulin aspart (NovoLOG) injection (RAPID ACTING)     lidocaine (LMX4) cream     lidocaine (XYLOCAINE) 2 % 15 mL, alum & mag hydroxide-simethicone (MAALOX) 15 mL GI Cocktail     lidocaine 1 % 0.1-1 mL     magnesium oxide (MAG-OX) half-tab 200 mg     melatonin tablet 1 mg     metoprolol succinate ER (TOPROL-XL) 24 hr tablet 100 mg     montelukast (SINGULAIR) tablet 10 mg     naloxone (NARCAN) injection 0.2 mg    Or     naloxone (NARCAN) injection 0.4 mg    Or     naloxone (NARCAN) injection 0.2 mg    Or     naloxone (NARCAN) injection 0.4 mg     nitroGLYcerin (NITROSTAT) sublingual tablet 0.4  "mg     omeprazole (priLOSEC) CR capsule 20 mg     ondansetron (ZOFRAN-ODT) ODT tab 4 mg    Or     ondansetron (ZOFRAN) injection 4 mg     oxyCODONE (ROXICODONE) tablet 5 mg     polyethylene glycol (MIRALAX) Packet 17 g     pravastatin (PRAVACHOL) tablet 40 mg     prochlorperazine (COMPAZINE) injection 10 mg    Or     prochlorperazine (COMPAZINE) tablet 10 mg    Or     prochlorperazine (COMPAZINE) suppository 25 mg     senna-docusate (SENOKOT-S/PERICOLACE) 8.6-50 MG per tablet 1 tablet    Or     senna-docusate (SENOKOT-S/PERICOLACE) 8.6-50 MG per tablet 2 tablet     sodium chloride (PF) 0.9% PF flush 3 mL     sodium chloride (PF) 0.9% PF flush 3 mL     spironolactone (ALDACTONE) tablet 25 mg     vitamin D2 (ERGOCALCIFEROL) 89545 units (1250 mcg) capsule 50,000 Units            Physical Exam:   VS:  /76 (BP Location: Left arm)   Pulse 79   Temp 98.2  F (36.8  C) (Temporal)   Resp 16   Ht 1.6 m (5' 3\")   Wt 74.8 kg (165 lb)   SpO2 99%   BMI 29.23 kg/m      Wt:   Wt Readings from Last 2 Encounters:   05/25/21 74.8 kg (165 lb)   05/10/21 77.1 kg (169 lb 14.4 oz)      Constitutional: Lying in bed, no acute distress.   Eyes: Conjunctiva anicteric  CV: No Yara edema  Respiratory: Breathing comfortably on ambient air  Abd: Nondistended, tender to palpation in epigastric and RUQ area.  Skin: warm, perfused, no jaundice  Neuro: A&O x 3         Laboratory:   No labs today thus far    Imaging/Procedures/Studies:   MRI Secretin 5/25/21:  IMPRESSION:  In the background of chronic pancreatitis (as identified over the  lower T1 signal of the pancreatic parenchyma along with ectatic side  branches with scattered areas of stricturing along the pancreatic  ductal course), there is mild peripancreatic stranding in the region  of pancreatic head which would be in keeping with superimposed mild  pancreatitis.     Masslike abnormality in the pancreatic head region with associated  dilatation of ductal branches in the pancreatic " uncinate process (new  finding since 2009). Although this could represent focal inflammatory  process, possibility of underlying neoplasm is not entirely excluded.  Patient would benefit from GI consultation for consideration of  possible inpatient or outpatient EUS evaluation.

## 2021-05-26 NOTE — PROGRESS NOTES
"[facility]    Medicine Progress Note - Hospitalist Service, Gold 10       Date of Admission:  5/24/2021  Assessment & Plan       Janine Cornell is a 55 year old female admitted on 5/24/2021. 54 yo F with PCOS, Chronic Idiopathic Pancreatitis, GPA, HFrEF (35%),CAD on DAPT, HTN, HLP, T2DM, Fibromyalgia. To [facility] ED with eipgastric pain and intolerance to PO for several weeks worse with eating. Lipase ~400. CT showing pancreatic head inflammation c/w pancreatitis. Last seen by Dr. Marcus in 2009 for chronic pancreatitis.    Plan for today:  Advance diet as tolerated  Prioritize PO medications over IV  MRI report consistent with acute on chronic pancreatitis  Will need follow up EUS +/- ERCP in 4 weeks to ensure focus of inflammation resolves    Acute on Chronic Idiopathic Pancreatitis - epigastric pain and imaging positive. Has had an extensive workup of her chronic postprandial abdominal pain, bloating, and early satiety including gastric emptying study (3/09) and MRI/MRCP (3/09) in addition to EGD (10/08) and endoscopic ultrasound (11/08).  MRCP was suggestive of idiopathic chronic pancreatitis. Unable to take Creon due to \"allergy to pork in the capsules\"  History of Cholecystectomy  - Panc/Bili GI Consult, last seen by Dr. Marcus 2009 but has had chronic symptoms persistently since  - Clear liquid diet ADAT  - MRI pancreas (with secretin) consistent with the diagnosis  - Will need EUS +/- ERCP in 4 weeks after acute phase has resolved.   - hold tramadol in lieu of dilauded    CAD s/p PCI to LAD,D2 and Prox RCA 11/2/11  History of NSTEMI  Ischemic Cardiomyopathy, HF with recovered EF - EF 35-40% 10/31/11, Echo on 7/2018 normal  Recent episode of atypical chest pain.   - continue PTA DAPT, pravastatin 40 mg daily  - Metoprolol succinate 100 mg po daily  - ACEi as part of antihypertensive combo pill noted below  - spironolactone 25 mg daily  - Nitroglycerine as home " med, will order if having anginal symptoms  - Echo is reassuring    Moderate Persistent Asthma   Allergic Rhinitis  - Continue fluticasone-vilanterol (Breo Ellipta)  - Continue Albuterol MDI and Nebs PRN  - Continue Singulair, Triamcinolone Nasal Spray, fexofenidine    Rheumatoid Arthritis - Seropositive non-erosive RA (arthritis, AM stiffness, high CRP, RF 26 but re-check neg 3/2015 on HCQ) Dx 5/2013  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 Shah and Dr. Valdez at [place]. 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  Type 2 Diabetes Mellitus - hold home metformin, medium resistance sliding scale insulin  Hypertension - continue Lisinopril-hydrochlorothiazide (20-25 mg daily),   Hyperlipidemia - statin as above  Iron Deficiency Anemia - on iron replacement  Vitamin D deficiency - continue VitD replacement  Polycystic Ovarian Syndrome - hold metformin, treat BG as above  Xerophthalmia, OD - continue Restasis   Fibromyalgia / IBS - hold dicyclomine       Diet: Regular Diet Adult    DVT Prophylaxis: Heparin SQ  Sanchez Catheter: not present  Code Status: Full Code           Disposition Plan   Expected discharge: Tomorrow, recommended to prior living arrangement once adequate pain management/ tolerating PO medications.  Entered: Jas Crow MD 05/26/2021, 4:43 PM       The patient's care was discussed with the Bedside Nurse, Patient and GI Consultant.    Jas Crow MD  Hospitalist Service, [address]  Contact information available via [facility] Paging/Directory  Please see sign in/sign out for up to date coverage information  ______________________________________________________________________    Interval History   Patient seen and examined.  No acute overnight events.  Pain is still significant, moderately well  controlled with medications.  Some nausea, no HA, dizziness, CP, difficulty breathing or other symptoms.     Data reviewed today: I reviewed all medications, new labs and imaging results over the last 24 hours. I personally reviewed no images or EKG's today.    Physical Exam   Vital Signs: Temp: 98.6  F (37  C) Temp src: Temporal BP: 139/81 Pulse: 72   Resp: 18 SpO2: 98 % O2 Device: None (Room air)    Weight: 165 lbs 0 oz  General Appearance: NAD  Respiratory: CTA b/l  Cardiovascular: RRR S1/S2, no m,r,g  GI: soft, tender, +BS  Skin: no rashes  Other: No pitting edema    Data   Recent Labs   Lab 05/26/21  1335 05/24/21  2135   WBC 8.9 13.5*   HGB 11.7 13.9   MCV 79 78    416    136   POTASSIUM 3.3* 3.3*   CHLORIDE 108 100   CO2 27 26   BUN 9 19   CR 0.95 0.92   ANIONGAP 5 10   KATHY 9.1 9.9   * 347*   ALBUMIN 3.8 4.3   PROTTOTAL 6.7* 8.0   BILITOTAL 0.6 0.3   ALKPHOS 87 104   ALT 32 30   AST 24 15   LIPASE  --  407*   TROPI  --  <0.015     Recent Results (from the past 24 hour(s))   MR Pancreas wo & w Contrast    Narrative    PRELIMINARY REPORT - The following report is a preliminary  interpretation.    Edematous pancreatic head/uncinate process as previous. No emergent  findings.    FINAL REPORT by Attending:  Liver MRI with secretin enhanced MRCP: 5/25/2021.    CLINICAL HISTORY:  Acute pancreatitis.    TECHNIQUE:  Images were acquired with and without intravenous contrast  through the abdomen. The following MR images were acquired: TrueFISP,  multiplanar T2 weighted, axial T1 in/out of phase, axial fat-saturated  T1, diffusion-weighted. Multiplanar T1-weighted images with fat  saturation were before contrast administration and at multiple time  points following the administration of intravenous contrast. Contrast  dose: 7.5 mL Gadavist intravenously.    Patient also received secretin, however postsecretin images were not  acquired as patient became uncomfortable. Patient was transported back  to  the inpatient unit.    FINDINGS:    Comparison study: CT abdomen and pelvis 5/25/2021. Ultrasound abdomen  5/24/2021.    FINDINGS:   MRCP:     There is no pancreas divisum. The pancreatic duct  does not appear  abnormally dilated, and  no sidebranches are visualized . The  pancreatic duct measures up to 4 mm, however does demonstrate  irregularity with stricturing in the pancreatic tail and pancreatic  body. Scattered areas of ectatic side branches are also noted.    The secretin was administered, however postsecretin images were not  acquired as patient was not feeling well immediately after the  injection.    No intra-or extra hepatic biliary dilation.  The common bile duct  measures 7 mm.     Pancreas: Overall lower T1 signal on precontrast images. Minimal  peripancreatic stranding in the region of pancreatic head, as also  seen on recent CT exam. There is 2.6 x 2.1 cm ill-defined masslike  abnormality in the enteric head region, but demonstrates slight  hypointensity on arterial phase compared to surrounding pancreatic  parenchyma. This also demonstrates mild restrictive diffusion and T2  slight hypointensity compared to surrounding pancreatic parenchyma.  Furthermore, there are dilated ductal branches in the uncinate process  as seen on image 7 series 14 with transition at the level of the  masslike abnormality. These dilated ductal branches are newly  appreciated since 2009 MRI exam.    Liver: No suspicious liver lesion. The hepatic vasculature is patent.  No definite hepatic steatosis or hepatitis and cirrhosis.    Gallbladder: Surgically absent.    Spleen: Normal.    Kidneys: No suspicious renal lesion. There is no hydronephrosis.    Adrenal glands: Normal.    Bowel: The visualized bowel loops are unremarkable. Normal appendix is  visualized.    Lymph nodes: There is no abdominal lymphadenopathy.    Blood vessels: No aneurysmal dilatation of abdominal aorta.    Lung bases: Clear.    Bones and soft tissues:  No suspicious lesion in the visualized  skeleton.    Mesentery and abdominal wall: Unremarkable, with exception of minimal  peripancreatic stranding in the region of pancreatic head.    Ascites: None.      Impression    IMPRESSION:  In the background of chronic pancreatitis (as identified over the  lower T1 signal of the pancreatic parenchyma along with ectatic side  branches with scattered areas of stricturing along the pancreatic  ductal course), there is mild peripancreatic stranding in the region  of pancreatic head which would be in keeping with superimposed mild  pancreatitis.    Masslike abnormality in the pancreatic head region with associated  dilatation of ductal branches in the pancreatic uncinate process (new  finding since 2009). Although this could represent focal inflammatory  process, possibility of underlying neoplasm is not entirely excluded.  Patient would benefit from GI consultation for consideration of  possible inpatient or outpatient EUS evaluation.    I have personally reviewed the examination and initial interpretation  and I agree with the findings.    CHIO METZGER MD     Medications       aspirin  81 mg Oral Daily     clopidogrel  75 mg Oral Daily     enoxaparin ANTICOAGULANT  40 mg Subcutaneous Q24H     folic acid  1 mg Oral Daily     lisinopril  20 mg Oral Daily    Or     hydrochlorothiazide  25 mg Oral Daily     hydroxychloroquine  400 mg Oral Daily     insulin aspart  1-6 Units Subcutaneous Q4H     magnesium oxide  200 mg Oral BID     metoprolol succinate ER  100 mg Oral Daily     montelukast  10 mg Oral At Bedtime     omeprazole  20 mg Oral BID AC     polyethylene glycol  17 g Oral Daily     pravastatin  40 mg Oral Daily     senna-docusate  1 tablet Oral BID    Or     senna-docusate  2 tablet Oral BID     sodium chloride (PF)  3 mL Intracatheter Q8H     spironolactone  25 mg Oral Daily     vitamin D2  50,000 Units Oral Q7 Days

## 2021-05-26 NOTE — PLAN OF CARE
Plan of Care Note    Reason for Admission: Abdominal Pain  Access Lines: Left Peripheral IV  Incisions/Drains: None  VS: Stable on RA  Diet: Clear Liquid  Activity: Up ad Veena  Pain Management: Upper abdominal Pain managed with PRN Oxycodone 5mg given @ 321  GI/: Voids spontaneously  Neuro: Alert and oriented x4  Team: Gold 10  Pertinent Labs: Blood Sugar 85 @ 330      Shift Summary  Patient was very frustrated that she couldn't get any sleep all day. Pt has swelling on right arm. Cold and Warm compress given to reduce swelling. Gave Pt aromatherapy to help with anxiety and sleep. Pt was able to get to sleep and slept most of the shift. Continue with POC.

## 2021-05-27 LAB
GLUCOSE BLDC GLUCOMTR-MCNC: 236 MG/DL (ref 70–99)
GLUCOSE BLDC GLUCOMTR-MCNC: 241 MG/DL (ref 70–99)
GLUCOSE BLDC GLUCOMTR-MCNC: 263 MG/DL (ref 70–99)
GLUCOSE BLDC GLUCOMTR-MCNC: 321 MG/DL (ref 70–99)
GLUCOSE BLDC GLUCOMTR-MCNC: 340 MG/DL (ref 70–99)

## 2021-05-27 PROCEDURE — 250N000013 HC RX MED GY IP 250 OP 250 PS 637: Performed by: STUDENT IN AN ORGANIZED HEALTH CARE EDUCATION/TRAINING PROGRAM

## 2021-05-27 PROCEDURE — 250N000013 HC RX MED GY IP 250 OP 250 PS 637: Performed by: INTERNAL MEDICINE

## 2021-05-27 PROCEDURE — 999N001017 HC STATISTIC GLUCOSE BY METER IP

## 2021-05-27 PROCEDURE — 250N000013 HC RX MED GY IP 250 OP 250 PS 637: Performed by: HOSPITALIST

## 2021-05-27 PROCEDURE — 250N000012 HC RX MED GY IP 250 OP 636 PS 637: Performed by: INTERNAL MEDICINE

## 2021-05-27 PROCEDURE — 99232 SBSQ HOSP IP/OBS MODERATE 35: CPT | Performed by: INTERNAL MEDICINE

## 2021-05-27 PROCEDURE — 99207 PR CDG-MDM COMPONENT: MEETS MODERATE - DOWN CODED: CPT | Performed by: INTERNAL MEDICINE

## 2021-05-27 PROCEDURE — 120N000002 HC R&B MED SURG/OB UMMC

## 2021-05-27 PROCEDURE — 250N000009 HC RX 250: Performed by: INTERNAL MEDICINE

## 2021-05-27 RX ORDER — SUCRALFATE ORAL 1 G/10ML
1 SUSPENSION ORAL
Status: DISCONTINUED | OUTPATIENT
Start: 2021-05-27 | End: 2021-05-28 | Stop reason: HOSPADM

## 2021-05-27 RX ORDER — DEXTROSE MONOHYDRATE 25 G/50ML
25-50 INJECTION, SOLUTION INTRAVENOUS
Status: DISCONTINUED | OUTPATIENT
Start: 2021-05-27 | End: 2021-05-28 | Stop reason: HOSPADM

## 2021-05-27 RX ORDER — NICOTINE POLACRILEX 4 MG
15-30 LOZENGE BUCCAL
Status: DISCONTINUED | OUTPATIENT
Start: 2021-05-27 | End: 2021-05-28 | Stop reason: HOSPADM

## 2021-05-27 RX ADMIN — INSULIN ASPART 3 UNITS: 100 INJECTION, SOLUTION INTRAVENOUS; SUBCUTANEOUS at 03:23

## 2021-05-27 RX ADMIN — SUCRALFATE 1 G: 1 SUSPENSION ORAL at 22:18

## 2021-05-27 RX ADMIN — OMEPRAZOLE 20 MG: 20 CAPSULE, DELAYED RELEASE ORAL at 16:41

## 2021-05-27 RX ADMIN — LISINOPRIL 20 MG: 20 TABLET ORAL at 23:50

## 2021-05-27 RX ADMIN — SPIRONOLACTONE 25 MG: 25 TABLET ORAL at 22:22

## 2021-05-27 RX ADMIN — LIDOCAINE HYDROCHLORIDE 30 ML: 20 SOLUTION ORAL; TOPICAL at 08:50

## 2021-05-27 RX ADMIN — POLYETHYLENE GLYCOL 3350 17 G: 17 POWDER, FOR SOLUTION ORAL at 23:48

## 2021-05-27 RX ADMIN — OXYCODONE HYDROCHLORIDE 5 MG: 5 TABLET ORAL at 08:50

## 2021-05-27 RX ADMIN — ASPIRIN 81 MG: 81 TABLET, COATED ORAL at 22:22

## 2021-05-27 RX ADMIN — Medication 200 MG: at 22:20

## 2021-05-27 RX ADMIN — SUCRALFATE 1 G: 1 SUSPENSION ORAL at 16:42

## 2021-05-27 RX ADMIN — PRAVASTATIN SODIUM 40 MG: 40 TABLET ORAL at 22:21

## 2021-05-27 RX ADMIN — HYDROXYCHLOROQUINE SULFATE 400 MG: 200 TABLET, FILM COATED ORAL at 22:17

## 2021-05-27 RX ADMIN — METOPROLOL SUCCINATE 100 MG: 100 TABLET, EXTENDED RELEASE ORAL at 22:23

## 2021-05-27 RX ADMIN — FOLIC ACID 1 MG: 1 TABLET ORAL at 22:21

## 2021-05-27 RX ADMIN — INSULIN ASPART 2 UNITS: 100 INJECTION, SOLUTION INTRAVENOUS; SUBCUTANEOUS at 17:09

## 2021-05-27 RX ADMIN — CLOPIDOGREL BISULFATE 75 MG: 75 TABLET ORAL at 22:18

## 2021-05-27 RX ADMIN — INSULIN GLARGINE 30 UNITS: 100 INJECTION, SOLUTION SUBCUTANEOUS at 11:51

## 2021-05-27 RX ADMIN — SUCRALFATE 1 G: 1 SUSPENSION ORAL at 09:01

## 2021-05-27 ASSESSMENT — ACTIVITIES OF DAILY LIVING (ADL)
ADLS_ACUITY_SCORE: 13

## 2021-05-27 NOTE — PROGRESS NOTES
Called for chest pain.Patient reports this is not a new chest pain. She has had it before- however, she is unable to differentiate if this feels like her costochondritis pain/ her cardiac pain or her GERD related pain.   Started after taking pills, felt intense pressure in epigastrium followed by left-sided chest pain. Reviewed her chart with cardiac w/u done- ECHO w/o WMA and EKG unremarkable.  - Gave her GI cocktail with improvement in her symptoms.   - Shared decision was made to defer another EKG and troponin given improvement in chest pain with GI cocktail    Plan  - Sucralfate QID and GI cocktail PRN  - If recurrent chest pain, will obtain EKG and Trop

## 2021-05-27 NOTE — PLAN OF CARE
"BP (!) 139/97 (BP Location: Left arm)   Pulse 68   Temp 97.5  F (36.4  C) (Temporal)   Resp 16   Ht 1.6 m (5' 3\")   Wt 74.8 kg (165 lb)   SpO2 99%   BMI 29.23 kg/m       A&Ox4, BP elevated OVSS RA. Pt expressed frustration multiple times throughout shift in regards to her medication orders/regimen. Writer informed the team, provider spoke with patient this afternoon but still has questions/concerns for team at this time. Writer is awaiting page back.  and 321, s/s insulin administered per parameters. Regular diet, tolerated well. No BM this evening, voiding spontaneously without saving. Denied PRN pain medications during PM shift. Ambulates independently. Continue POC.  "

## 2021-05-27 NOTE — PLAN OF CARE
"AVSS. Continued to report frustration with lack of sleep. Abdominal pain managed with Oxycodone and GI cocktail. On a regular diet, denied nausea but reported some increased pain with eating.  and 340. Home Lantus restarted. Refused Novolog because she \"doesn't take it at home.\" MD notified. Up ad brandi in room. Voiding, not saving. Denied gas and BM. MD aware of lack of IV access. Continue with plan of care.    "

## 2021-05-27 NOTE — PROGRESS NOTES
Gastroenterology Follow up Note       Assessment and Plan:   Janine Cornell is a 55 year old female with PMHx of HFrEF (35%), CAD on DAPT, granulomatosis with polyangiitis, PCOS, fibromyalgia and chronic pancreatitis who presents with acute on chronic pain.     #. Acute on Chronic Abdominal Pain  #. Acute on Chronic Pancreatitis   BISAP score: 0    Patient presented with acute pain epigastric pain for 2 weeks prior to admission w/nausea and emesis. Known history of chronic pancreatitis with MR secretin scan 2009 suggestive of chronic pancreatitis. EUS 2008 without ductal abnormalities, but pancreas with appearance consistent with chronic pancreatitis. CT w/o contrast on admission with mild enlargement around pancreatic head w/slight inflammation; no contrast was given due to patient preference. Given 2 out of 3 criteria (epigastric abdominal + imaging findings), meets acute on chronic pancreatitis criteria. Patient with ongoing tobacco use, which may contribute to acute on chronic pancreatitis. Patient denies alcohol use. S/p cholecystectomy.     Given granulomatosis with polyangiitis, there is a concern that the etiology of pancreatitis may be related to autoimmune pancreatitis. The appearance of the pancreas is not typical of type 1 autoimmune pancreatitis. Cannot exclude type II, however she has been on rituximab.- which is part of the treatment for type 2 autoimmune pancreatitis.     MRI w/secretin on 5/26 - unable to interpret secretin component of the study because patient unable to complete the study. Patient with mild campos-pancreatic stranding on top chronic pancreatitis. There is evidence of pancreatic stricturing and a mass like abnormality in the pancreatic head.    Ongoing abdominal pain. In the last 24 hours received: IV dilaudid 0.3 x1 and oxycodone 5mg x 3. Minimal PO intake.           Recommendations:   - If patient willing, consider ordering a serum lipase to evaluate for ongoing inflammation  -  Analgesics, anti-emetics & electrolyte repletion per primary team  - Bowel regimen per primary team while on opioids  - A PETH test was ordered, but canceled; primary team to reconsider ordering   - Recommend outpatient tobacco cessation follow up     Outpatient:  - Plan for follow up with Dr. Epstein in 4 weeks to determine next steps, consider EUS and consideration of ERCP given MRI suggestive of pancreatic duct strictures; message sent to schedulers to assist with scheduling    It has been a pleasure to participate in the care of this patient.  Patient discussed with GI staff, Dr. Jean.  Please do not hesitate to contact the GI service with any questions or concerns.     Jennifer Horan (Lizzie)  Gastroenterology Fellow  Pager [phone]         Subjective/Objective:   - No acute events overnight  - No fevers or chills   - Didn't sleep well due to distractions in her room  - Ongoing abdominal pain, worse since 2am; hasn't gotten pain medications since 10p on 5/26         Medications:     Current Facility-Administered Medications   Medication     albuterol (PROAIR HFA/PROVENTIL HFA/VENTOLIN HFA) 108 (90 Base) MCG/ACT inhaler 2 puff     albuterol (PROVENTIL) neb solution 2.5 mg     aspirin EC tablet 81 mg     calcium carbonate (TUMS) chewable tablet 1,000 mg     clopidogrel (PLAVIX) tablet 75 mg     cyclobenzaprine (FLEXERIL) tablet 10 mg     glucose gel 15-30 g    Or     dextrose 50 % injection 25-50 mL    Or     glucagon injection 1 mg     enoxaparin ANTICOAGULANT (LOVENOX) injection 40 mg     fexofenadine (ALLEGRA) tablet 180 mg     folic acid (FOLVITE) tablet 1 mg     lisinopril (ZESTRIL) tablet 20 mg    Or     hydrochlorothiazide (HYDRODIURIL) tablet 25 mg     HYDROmorphone (PF) (DILAUDID) injection 0.3-0.5 mg     hydroxychloroquine (PLAQUENIL) tablet 400 mg     insulin aspart (NovoLOG) injection (RAPID ACTING)     lidocaine (LMX4) cream     lidocaine (XYLOCAINE) 2 % 15 mL, alum & mag hydroxide-simethicone  "(MAALOX) 15 mL GI Cocktail     lidocaine 1 % 0.1-1 mL     magnesium oxide (MAG-OX) half-tab 200 mg     melatonin tablet 1 mg     metoprolol succinate ER (TOPROL-XL) 24 hr tablet 100 mg     montelukast (SINGULAIR) tablet 10 mg     naloxone (NARCAN) injection 0.2 mg    Or     naloxone (NARCAN) injection 0.4 mg    Or     naloxone (NARCAN) injection 0.2 mg    Or     naloxone (NARCAN) injection 0.4 mg     nitroGLYcerin (NITROSTAT) sublingual tablet 0.4 mg     omeprazole (priLOSEC) CR capsule 20 mg     ondansetron (ZOFRAN-ODT) ODT tab 4 mg    Or     ondansetron (ZOFRAN) injection 4 mg     oxyCODONE (ROXICODONE) tablet 5 mg     polyethylene glycol (MIRALAX) Packet 17 g     polyethylene glycol (MIRALAX) Packet 17 g     pravastatin (PRAVACHOL) tablet 40 mg     prochlorperazine (COMPAZINE) injection 10 mg    Or     prochlorperazine (COMPAZINE) tablet 10 mg    Or     prochlorperazine (COMPAZINE) suppository 25 mg     senna-docusate (SENOKOT-S/PERICOLACE) 8.6-50 MG per tablet 1 tablet    Or     senna-docusate (SENOKOT-S/PERICOLACE) 8.6-50 MG per tablet 2 tablet     sodium chloride (PF) 0.9% PF flush 3 mL     sodium chloride (PF) 0.9% PF flush 3 mL     spironolactone (ALDACTONE) tablet 25 mg     sucralfate (CARAFATE) suspension 1 g     vitamin D2 (ERGOCALCIFEROL) 62259 units (1250 mcg) capsule 50,000 Units            Physical Exam:   VS:  BP (!) 159/93 (BP Location: Right arm)   Pulse 71   Temp 98.6  F (37  C) (Oral)   Resp 18   Ht 1.6 m (5' 3\")   Wt 74.8 kg (165 lb)   SpO2 100%   BMI 29.23 kg/m      Wt:   Wt Readings from Last 2 Encounters:   05/25/21 74.8 kg (165 lb)   05/10/21 77.1 kg (169 lb 14.4 oz)      Patient deferred exam    Constitutional: Lying in bed  Eyes: Conjunctiva anicteric  Respiratory: Breathing comfortably on ambient air  Skin: warm, perfused, no jaundice         Laboratory:   No labs today - only blood glucose    Imaging/Procedures/Studies:   No imaging in the last 24 hours    "

## 2021-05-27 NOTE — PROGRESS NOTES
Took over cares of pt @ 1900. Pt BG for prior nurse was 219, no insulin given per pt request. Pt deferred all 2000 meds, requesting them be given @ bedtime per her home regimen. BG rechecked @ 2200 and again @ 0300 and insulin given per parameters. Pt complained of L sided chest pain after taking her night pills. Pt has history of some chest pain but was unable to differentiate this pain. Paged out to team, Dr. Hand ordered GI cocktail and planned to follow up at bedside. GI cocktail did provide relief. Pt stated she prefers to avoid the Oxy when possible but did agree to take at bedtime. VSS on RA. Up ad brandi and walked the hallways tonight. BS active, no reported BM overnight. Voiding spontaneously, not saving.

## 2021-05-27 NOTE — PROGRESS NOTES
"[facility]    Medicine Progress Note - Hospitalist Service, Gold 10       Date of Admission:  5/24/2021  Assessment & Plan     Janine Cornell is a 55 year old female admitted on 5/24/2021. 56 yo F with PCOS, Chronic Idiopathic Pancreatitis, GPA, HFrEF (35%),CAD on DAPT, HTN, HLP, T2DM, Fibromyalgia. To [facility] [place] ED with eipgastric pain and intolerance to PO for several weeks worse with eating. Lipase ~400. CT showing pancreatic head inflammation c/w pancreatitis.     Plan for today:  Advance diet as tolerated  Okay for no PIV  Upper GI series with SBFT tomorrow morning given post prandial fullness and difficulty eating (per patient).  If she feels better this can be cancelled.   MRI report consistent with acute on chronic pancreatitis  Will need follow up EUS +/- ERCP in 4 weeks to ensure focus of inflammation resolves  Encourage taking the novolog while her lantus is uptitrated.      Acute on Chronic Idiopathic Pancreatitis - epigastric pain and imaging positive. Has had an extensive workup of her chronic postprandial abdominal pain, bloating, and early satiety including gastric emptying study (3/09) and MRI/MRCP (3/09) in addition to EGD (10/08) and endoscopic ultrasound (11/08).  MRCP was suggestive of idiopathic chronic pancreatitis. Unable to take Creon due to \"allergy to pork in the capsules\"    History of Cholecystectomy  - Panc/Bili GI Consult, last seen by Dr. Marcus 2009 but has had chronic symptoms persistently since  - Clear liquid diet ADAT  - MRI pancreas (with secretin) consistent with the diagnosis  - Will need EUS +/- ERCP in 4 weeks after acute phase has resolved.   - hold tramadol in lieu of dilauded    CAD s/p PCI to LAD,D2 and Prox RCA 11/2/11  History of NSTEMI  Ischemic Cardiomyopathy, HF with recovered EF - EF 35-40% 10/31/11, Echo on 7/2018 normal  Recent episode of atypical chest pain.   - continue PTA DAPT, pravastatin 40 mg daily  - Metoprolol " succinate 100 mg po daily  - ACEi as part of antihypertensive combo pill noted below  - spironolactone 25 mg daily  - Nitroglycerine as home med, will order if having anginal symptoms  - Echo is reassuring    Moderate Persistent Asthma   Allergic Rhinitis  - Continue fluticasone-vilanterol (Breo Ellipta)  - Continue Albuterol MDI and Nebs PRN  - Continue Singulair, Triamcinolone Nasal Spray, fexofenidine    Rheumatoid Arthritis - Seropositive non-erosive RA (arthritis, AM stiffness, high CRP, RF 26 but re-check neg 3/2015 on HCQ) Dx 5/2013  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 Shah and Dr. Valdez at [place]. 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  Type 2 Diabetes Mellitus - hold home metformin, medium resistance sliding scale insulin  Hypertension - continue Lisinopril-hydrochlorothiazide (20-25 mg daily),   Hyperlipidemia - statin as above  Iron Deficiency Anemia - on iron replacement  Vitamin D deficiency - continue VitD replacement  Polycystic Ovarian Syndrome - hold metformin, treat BG as above  Xerophthalmia, OD - continue Restasis   Fibromyalgia / IBS - hold dicyclomine       Diet: Regular Diet Adult    DVT Prophylaxis: Heparin SQ  Sanchez Catheter: not present  Code Status: Full Code           Disposition Plan   Expected discharge: Tomorrow, recommended to prior living arrangement once adequate pain management/ tolerating PO medications.  Entered: Jas rCow MD 05/27/2021, 4:46 PM       The patient's care was discussed with the Bedside Nurse and Patient.    Jas Crow MD  Hospitalist Service, [address]  Contact information available via [facility] Paging/Directory  Please see sign in/sign out for up to date coverage  information  ______________________________________________________________________    Interval History   Patient seen and examined, no acute overnight events.  Still with abdominal pain with mild nausea and significant fullness.  Chest pain has resolved.      Data reviewed today: I reviewed all medications, new labs and imaging results over the last 24 hours. I personally reviewed no images or EKG's today.    Physical Exam   Vital Signs: Temp: 97.5  F (36.4  C) Temp src: Tympanic BP: (!) 139/97 Pulse: 68   Resp: 16 SpO2: 99 % O2 Device: None (Room air)    Weight: 165 lbs 0 oz  General Appearance: NAD  Respiratory: CTA b/l  Cardiovascular: RRR S1/S2, no m,r,g  GI: soft, NT, ND, +BS  Skin: no rashes  Other: No edema     Data   Recent Labs   Lab 05/26/21  1335 05/24/21  2135   WBC 8.9 13.5*   HGB 11.7 13.9   MCV 79 78    416    136   POTASSIUM 3.3* 3.3*   CHLORIDE 108 100   CO2 27 26   BUN 9 19   CR 0.95 0.92   ANIONGAP 5 10   KATHY 9.1 9.9   * 347*   ALBUMIN 3.8 4.3   PROTTOTAL 6.7* 8.0   BILITOTAL 0.6 0.3   ALKPHOS 87 104   ALT 32 30   AST 24 15   LIPASE  --  407*   TROPI  --  <0.015     No results found for this or any previous visit (from the past 24 hour(s)).  Medications       aspirin  81 mg Oral Daily     clopidogrel  75 mg Oral Daily     enoxaparin ANTICOAGULANT  40 mg Subcutaneous Q24H     folic acid  1 mg Oral Daily     lisinopril  20 mg Oral Daily    Or     hydrochlorothiazide  25 mg Oral Daily     hydroxychloroquine  400 mg Oral Daily     insulin aspart  1-7 Units Subcutaneous TID AC     insulin aspart  1-5 Units Subcutaneous At Bedtime     insulin glargine  30 Units Subcutaneous Daily     magnesium oxide  200 mg Oral BID     metoprolol succinate ER  100 mg Oral Daily     montelukast  10 mg Oral At Bedtime     omeprazole  20 mg Oral BID AC     polyethylene glycol  17 g Oral Daily     pravastatin  40 mg Oral Daily     senna-docusate  1 tablet Oral BID    Or     senna-docusate  2 tablet  Oral BID     sodium chloride (PF)  3 mL Intracatheter Q8H     spironolactone  25 mg Oral Daily     sucralfate  1 g Oral 4x Daily AC & HS     vitamin D2  50,000 Units Oral Q7 Days

## 2021-05-27 NOTE — PROGRESS NOTES
CLINICAL NUTRITION SERVICES - ASSESSMENT NOTE     Nutrition Prescription    RECOMMENDATIONS FOR MDs/PROVIDERS TO ORDER:  1. Consider low fat diet given chronic pancreatitis if appropriate (however, pt reports she generally leans towards low fat diet at baseline)    2. K+ was 3.3 (L) yesterday, replace/recheck per provider discretion  3. Consider Endocrine consult pending ongoing BG trends    Malnutrition Status:    Non-severe malnutrition in the context of acute illness    Recommendations already ordered by Registered Dietitian (RD):  PRN supplements    Future/Additional Recommendations:  1. Monitor PO intakes/wt trends.  Offer scheduled snacks or supplements if pt agreeable/needed.    2. Please alert RD if pt would like additional nutrition education before discharge     REASON FOR ASSESSMENT  Janine Cornell is a/an 55 year old female assessed by the dietitian for Malnutrition screening tool score >/=2    NUTRITION HISTORY  Patient reports decreased appetite for the past several days 2/2 pain.  Pt reports a shellfish and pork allergies, denies any known food allergies.  Pt avoids most milk/dairy products too due to likely lactose intolerance/GI issues (can tolerate small amounts); has thought about trying Boost or Ensure in the past when she's had pain flares/decreased PO intakes, but was worried she wouldn't tolerate it.  Pt says pain happened after all PO intakes, no foods/beverages worse than others.  Pt eats lean meats/fishes for source of protein, at times small amounts of dairy.    Listed allergies include pork and shellfish - pt reports she cannot take Creon due to pork allergy (Creon is pork derived).  Does not appear fecal elastase has been checked per my chart review.  PMH includes chronic idiopathic pancreatitis, HFrEF (35%), HTN, and T2DM.    CURRENT NUTRITION ORDERS  Diet: Regular  Intake/Tolerance: was clear liquids 5/25, advanced to regular diet yesterday 5/26.  Pt reports improved PO intake since  "yesterday since new meds started by her primary team (GI cocktail seems to have really helped)    LABS  Labs reviewed  - BGs 200s-300s since yesterday afternoon    MEDICATIONS  Medications reviewed  - Folic acid  - Medium sliding scale insulin TID before meals + at bedtime  - Lantus (30 units at 1000 daily)  - Spironolactone  - Carafate TID before meals + nightly  - Vitamin D2 (50,000 units every 7 days)    ANTHROPOMETRICS  Height: 160 cm (5' 3\")  Most Recent Weight: 74.8 kg (165 lb)    IBW: 52.3 kg   BMI: Overweight BMI 25-29.9  Weight History: 10 lb wt loss over the past 2 weeks (5.9% wt loss)  Wt Readings from Last 10 Encounters:   05/25/21 74.8 kg (165 lb)   05/24/21 72.3 kg (159 lb 8 oz)   05/10/21 77.1 kg (169 lb 14.4 oz)   02/02/21 72.5 kg (159 lb 12.8 oz)   01/19/21 75.5 kg (166 lb 6.4 oz)   07/02/20 73.8 kg (162 lb 11.2 oz)   06/18/20 75.1 kg (165 lb 8 oz)   02/21/20 78.3 kg (172 lb 11.2 oz)   02/06/20 77.1 kg (170 lb)   12/19/19 76.2 kg (167 lb 15.9 oz)   12/05/19 77.1 kg (169 lb 15.6 oz)      Dosing Weight: 57 kg (adjusted based on actual/admit wt 72.3 kg on 5/24 and IBW)    ASSESSED NUTRITION NEEDS  Estimated Energy Needs: 4427-4903 kcals/day (25 - 30 kcals/kg)  Justification: Maintenance  Estimated Protein Needs: 68-86 grams protein/day (1.2 - 1.5 grams of pro/kg)  Justification: Hypercatabolism with acute illness and Repletion  Estimated Fluid Needs: (1 mL/kcal)   Justification: Maintenance, or other per provider pending fluid status    PHYSICAL FINDINGS  See malnutrition section below.    MALNUTRITION  % Intake: < 75% for > 7 days (non-severe)  % Weight Loss: > 2% in 1 week (severe)  Subcutaneous Fat Loss: None observed  Muscle Loss: Generalized mild muscle losses from baseline per patient  Fluid Accumulation/Edema: None noted per chart review  Malnutrition Diagnosis: Non-severe malnutrition in the context of acute illness    NUTRITION DIAGNOSIS  Inadequate oral intake related to decreased appetite " 2/2 pain as evidenced by poor PO intake and unintentional 10 lb wt loss the past several days PTA      INTERVENTIONS  Implementation  Nutrition Education: Provided education on role of RD and nutrition POC.  Discussed diet strategies for when having pancreatitis flare/pain (low fat; small/frequent meals; protein with meals).  Provided handout Pancreatitis Nutrition Therapy.  Went over some dairy free supplement drinks available, will send up a [employer] ONS for patient to try today.    Medical food supplement therapy: see above    Goals  Patient to consume % of nutritionally adequate meal trays TID, or the equivalent with supplements/snacks.     Monitoring/Evaluation  Progress toward goals will be monitored and evaluated per protocol.     Spring Cooley RD, LD  Pager: [phone]  Units covered: 7C (all beds) and 5A (beds 9121 through 5211-2)

## 2021-05-28 ENCOUNTER — APPOINTMENT (OUTPATIENT)
Dept: ULTRASOUND IMAGING | Facility: CLINIC | Age: 56
End: 2021-05-28
Attending: INTERNAL MEDICINE
Payer: COMMERCIAL

## 2021-05-28 ENCOUNTER — APPOINTMENT (OUTPATIENT)
Dept: GENERAL RADIOLOGY | Facility: CLINIC | Age: 56
End: 2021-05-28
Attending: INTERNAL MEDICINE
Payer: COMMERCIAL

## 2021-05-28 VITALS
HEART RATE: 70 BPM | DIASTOLIC BLOOD PRESSURE: 93 MMHG | RESPIRATION RATE: 16 BRPM | BODY MASS INDEX: 29.23 KG/M2 | TEMPERATURE: 97 F | SYSTOLIC BLOOD PRESSURE: 162 MMHG | WEIGHT: 165 LBS | HEIGHT: 63 IN | OXYGEN SATURATION: 99 %

## 2021-05-28 LAB
ALBUMIN SERPL-MCNC: 3.7 G/DL (ref 3.4–5)
ALP SERPL-CCNC: 95 U/L (ref 40–150)
ALT SERPL W P-5'-P-CCNC: 28 U/L (ref 0–50)
ANION GAP SERPL CALCULATED.3IONS-SCNC: 5 MMOL/L (ref 3–14)
AST SERPL W P-5'-P-CCNC: 20 U/L (ref 0–45)
BILIRUB SERPL-MCNC: 0.4 MG/DL (ref 0.2–1.3)
BUN SERPL-MCNC: 12 MG/DL (ref 7–30)
CALCIUM SERPL-MCNC: 9.4 MG/DL (ref 8.5–10.1)
CHLORIDE SERPL-SCNC: 106 MMOL/L (ref 94–109)
CO2 SERPL-SCNC: 27 MMOL/L (ref 20–32)
CREAT SERPL-MCNC: 1 MG/DL (ref 0.52–1.04)
CRP SERPL-MCNC: 4.9 MG/L (ref 0–8)
ERYTHROCYTE [DISTWIDTH] IN BLOOD BY AUTOMATED COUNT: 12.8 % (ref 10–15)
GFR SERPL CREATININE-BSD FRML MDRD: 64 ML/MIN/{1.73_M2}
GLUCOSE BLDC GLUCOMTR-MCNC: 256 MG/DL (ref 70–99)
GLUCOSE BLDC GLUCOMTR-MCNC: 315 MG/DL (ref 70–99)
GLUCOSE SERPL-MCNC: 223 MG/DL (ref 70–99)
HCT VFR BLD AUTO: 36.7 % (ref 35–47)
HGB BLD-MCNC: 12.5 G/DL (ref 11.7–15.7)
MAGNESIUM SERPL-MCNC: 1.9 MG/DL (ref 1.6–2.3)
MCH RBC QN AUTO: 26.4 PG (ref 26.5–33)
MCHC RBC AUTO-ENTMCNC: 34.1 G/DL (ref 31.5–36.5)
MCV RBC AUTO: 77 FL (ref 78–100)
PHOSPHATE SERPL-MCNC: 3.3 MG/DL (ref 2.5–4.5)
PLATELET # BLD AUTO: 344 10E9/L (ref 150–450)
POTASSIUM SERPL-SCNC: 3.5 MMOL/L (ref 3.4–5.3)
PROT SERPL-MCNC: 6.9 G/DL (ref 6.8–8.8)
RBC # BLD AUTO: 4.74 10E12/L (ref 3.8–5.2)
SODIUM SERPL-SCNC: 138 MMOL/L (ref 133–144)
WBC # BLD AUTO: 9 10E9/L (ref 4–11)

## 2021-05-28 PROCEDURE — 93971 EXTREMITY STUDY: CPT | Mod: 26 | Performed by: RADIOLOGY

## 2021-05-28 PROCEDURE — 250N000013 HC RX MED GY IP 250 OP 250 PS 637: Performed by: INTERNAL MEDICINE

## 2021-05-28 PROCEDURE — 85027 COMPLETE CBC AUTOMATED: CPT | Performed by: INTERNAL MEDICINE

## 2021-05-28 PROCEDURE — 82565 ASSAY OF CREATININE: CPT | Performed by: STUDENT IN AN ORGANIZED HEALTH CARE EDUCATION/TRAINING PROGRAM

## 2021-05-28 PROCEDURE — 93971 EXTREMITY STUDY: CPT | Mod: RT

## 2021-05-28 PROCEDURE — 83735 ASSAY OF MAGNESIUM: CPT | Performed by: INTERNAL MEDICINE

## 2021-05-28 PROCEDURE — 250N000013 HC RX MED GY IP 250 OP 250 PS 637: Performed by: HOSPITALIST

## 2021-05-28 PROCEDURE — 250N000009 HC RX 250: Performed by: INTERNAL MEDICINE

## 2021-05-28 PROCEDURE — 250N000012 HC RX MED GY IP 250 OP 636 PS 637: Performed by: INTERNAL MEDICINE

## 2021-05-28 PROCEDURE — 74018 RADEX ABDOMEN 1 VIEW: CPT | Mod: 26 | Performed by: RADIOLOGY

## 2021-05-28 PROCEDURE — 250N000013 HC RX MED GY IP 250 OP 250 PS 637: Performed by: STUDENT IN AN ORGANIZED HEALTH CARE EDUCATION/TRAINING PROGRAM

## 2021-05-28 PROCEDURE — 84100 ASSAY OF PHOSPHORUS: CPT | Performed by: INTERNAL MEDICINE

## 2021-05-28 PROCEDURE — 74018 RADEX ABDOMEN 1 VIEW: CPT

## 2021-05-28 PROCEDURE — 80053 COMPREHEN METABOLIC PANEL: CPT | Performed by: INTERNAL MEDICINE

## 2021-05-28 PROCEDURE — 86140 C-REACTIVE PROTEIN: CPT | Performed by: INTERNAL MEDICINE

## 2021-05-28 PROCEDURE — 36415 COLL VENOUS BLD VENIPUNCTURE: CPT | Performed by: INTERNAL MEDICINE

## 2021-05-28 PROCEDURE — 999N001017 HC STATISTIC GLUCOSE BY METER IP

## 2021-05-28 RX ORDER — HYDROCHLOROTHIAZIDE 25 MG/1
25 TABLET ORAL EVERY EVENING
Status: DISCONTINUED | OUTPATIENT
Start: 2021-05-28 | End: 2021-05-28 | Stop reason: HOSPADM

## 2021-05-28 RX ORDER — LISINOPRIL 20 MG/1
20 TABLET ORAL EVERY EVENING
Status: DISCONTINUED | OUTPATIENT
Start: 2021-05-28 | End: 2021-05-28 | Stop reason: HOSPADM

## 2021-05-28 RX ORDER — POLYETHYLENE GLYCOL 3350 17 G/17G
17 POWDER, FOR SOLUTION ORAL DAILY
Qty: 510 G
Start: 2021-05-28 | End: 2021-05-28

## 2021-05-28 RX ORDER — POLYETHYLENE GLYCOL 3350 17 G/17G
17 POWDER, FOR SOLUTION ORAL DAILY
Qty: 225 G | Refills: 0 | Status: SHIPPED | OUTPATIENT
Start: 2021-05-28 | End: 2022-02-08

## 2021-05-28 RX ORDER — SUCRALFATE ORAL 1 G/10ML
1 SUSPENSION ORAL
Qty: 1200 ML | Refills: 0 | Status: SHIPPED | OUTPATIENT
Start: 2021-05-28 | End: 2021-06-27

## 2021-05-28 RX ORDER — FAMOTIDINE 20 MG/1
20 TABLET, FILM COATED ORAL 2 TIMES DAILY PRN
Qty: 20 TABLET | Refills: 0 | Status: SHIPPED | OUTPATIENT
Start: 2021-05-28 | End: 2023-06-21

## 2021-05-28 RX ORDER — OXYCODONE HYDROCHLORIDE 5 MG/1
5 TABLET ORAL EVERY 6 HOURS PRN
Qty: 16 TABLET | Refills: 0 | Status: SHIPPED | OUTPATIENT
Start: 2021-05-28 | End: 2021-06-01

## 2021-05-28 RX ORDER — ONDANSETRON 4 MG/1
4 TABLET, ORALLY DISINTEGRATING ORAL EVERY 8 HOURS PRN
Qty: 12 TABLET | Refills: 0 | Status: SHIPPED | OUTPATIENT
Start: 2021-05-28 | End: 2022-08-19

## 2021-05-28 RX ADMIN — HYDROCHLOROTHIAZIDE 25 MG: 25 TABLET ORAL at 00:03

## 2021-05-28 RX ADMIN — OXYCODONE HYDROCHLORIDE 5 MG: 5 TABLET ORAL at 01:01

## 2021-05-28 RX ADMIN — Medication 200 MG: at 08:16

## 2021-05-28 RX ADMIN — SUCRALFATE 1 G: 1 SUSPENSION ORAL at 12:37

## 2021-05-28 RX ADMIN — OMEPRAZOLE 20 MG: 20 CAPSULE, DELAYED RELEASE ORAL at 08:22

## 2021-05-28 RX ADMIN — INSULIN GLARGINE 58 UNITS: 100 INJECTION, SOLUTION SUBCUTANEOUS at 08:18

## 2021-05-28 RX ADMIN — LIDOCAINE HYDROCHLORIDE 30 ML: 20 SOLUTION ORAL; TOPICAL at 01:55

## 2021-05-28 RX ADMIN — LISINOPRIL 20 MG: 20 TABLET ORAL at 00:03

## 2021-05-28 RX ADMIN — SUCRALFATE 1 G: 1 SUSPENSION ORAL at 08:17

## 2021-05-28 ASSESSMENT — ACTIVITIES OF DAILY LIVING (ADL)
ADLS_ACUITY_SCORE: 13

## 2021-05-28 NOTE — PROVIDER NOTIFICATION
Gold cross-cover notified re: Elevated BP and BG, R throbbing arm pain and increased epigastric pain radiating to back.  Also notified of incorrect BP med order which was corrected by pharmacist and given at 0000.    Spoke with JOANA Hand MD.  No new orders received.  Will offer GI cocktail, continue to monitor, notify of any changes.

## 2021-05-28 NOTE — PROGRESS NOTES
"Sent page to Dr. Crow re: patient not passing flatus, also complaining of left arm pain and swelling. Trace swelling at most on right shoulder. Dr. Crow returned call stating team will rule out DVT with ultrasound of right arm. Will also do abdominal x-ray to assess constipation.     Patient refusing novolog insulin as sliding scale for blood sugar coverage. FYI sent to Dr. Crow, who acknowledged that patient is refusing novolog.     Follow up: scans WNL, plan to discharge patient today. AOVSS, BP (!) 162/93 (BP Location: Left arm)   Pulse 70   Temp 97  F (36.1  C) (Oral)   Resp 16   Ht 1.6 m (5' 3\")   Wt 74.8 kg (165 lb)   SpO2 99%   BMI 29.23 kg/m  .       Discharge  D: Orders for discharge and outpatient medications written and reviewed with patient   I: home medications and return to clinic schedule provided, discharge instructions and parameters for calling provided. Health Care Provider reviewed with teach back. Patient left at 1430 accompanied by Anisha ROACH RN   A: Patient/family verbalized understanding and was ready for discharge   P: patient discharged to pharmacy, then home with son.   "

## 2021-05-28 NOTE — PLAN OF CARE
"Alert, oriented, UAL in room. No IV access, team aware.  BP elevated, OVSS, see provider notification note.   Expressed frustration, feels she is not being listened to questions regarding meds, especially BP and diabetes meds.  C/o R arm discomfort \"throbbing\" that has been getting worse; also had an episode of abd bloating and epigastric pain that radiated to the back. After oxycodone, GI cocktail, heat and repositioning patient appears to be sleeping comfortably.  Drinking lots of water and voiding lg amounts.  Reg diet, tolerating fairly.   PLAN: Possible discharge today if pain managed with PO meds.  "

## 2021-05-28 NOTE — PROGRESS NOTES
Gastroenterology Follow up Note       Assessment and Plan:   Janine Cornell is a 55 year old female with PMHx of HFrEF (35%), CAD on DAPT, granulomatosis with polyangiitis, PCOS, fibromyalgia and chronic pancreatitis who presents with acute on chronic pain.     #. Acute on Chronic Abdominal Pain  #. Acute on Chronic Pancreatitis   BISAP score: 0    Patient presented with acute pain epigastric pain for 2 weeks prior to admission w/nausea and emesis. Known history of chronic pancreatitis with MR secretin scan 2009 suggestive of chronic pancreatitis. EUS 2008 without ductal abnormalities, but pancreas with appearance consistent with chronic pancreatitis. CT w/o contrast on admission with mild enlargement around pancreatic head w/slight inflammation; no contrast was given due to patient preference. Given 2 out of 3 criteria (epigastric abdominal + imaging findings), meets acute on chronic pancreatitis criteria. Patient with ongoing tobacco use, which may contribute to acute on chronic pancreatitis. Patient denies alcohol use. S/p cholecystectomy.     Given granulomatosis with polyangiitis, there is a concern that the etiology of pancreatitis may be related to autoimmune pancreatitis. The appearance of the pancreas is not typical of type 1 autoimmune pancreatitis. Cannot exclude type II, however she has been on rituximab.- which is part of the treatment for type 2 autoimmune pancreatitis.     MRI w/secretin on 5/26 - unable to interpret secretin component of the study because patient unable to complete the study. Patient with mild campos-pancreatic stranding on top chronic pancreatitis. There is evidence of pancreatic stricturing and a mass like abnormality in the pancreatic head.    Abdominal pain improving, no longer requiring IV dilaudid in the last 24 hours. Oxycodone 5mg x1 in the last 24 hours. PO intake increasing with elevated blood glucose levels.          Recommendations:   No new recommendations per GI    -  Analgesics, anti-emetics & electrolyte repletion per primary team  - Bowel regimen per primary team while on opioids  - Recommend outpatient tobacco cessation follow up     Outpatient:  - Plan for follow up with Dr. Epstein in 4 weeks to determine next steps, consider EUS and consideration of ERCP given MRI suggestive of pancreatic duct strictures; message sent to schedulers to assist with scheduling    It has been a pleasure to participate in the care of this patient.  Patient discussed with GI staff, Dr. Jean.  Please do not hesitate to contact the GI service with any questions or concerns.     Jennifer Horan (Lizzie)  Gastroenterology Fellow  Pager [phone]         Subjective/Objective:   - No acute events overnight  - Mentions new onset right upper extremity swelling  - Expressed frustration about elevated blood pressures, concerned that unilateral swelling is related to hypertension   - Increased PO intake in the last 24 hours, decreased pain medication requirements  - No fevers or chills         Medications:     Current Facility-Administered Medications   Medication     albuterol (PROAIR HFA/PROVENTIL HFA/VENTOLIN HFA) 108 (90 Base) MCG/ACT inhaler 2 puff     albuterol (PROVENTIL) neb solution 2.5 mg     aspirin EC tablet 81 mg     calcium carbonate (TUMS) chewable tablet 1,000 mg     clopidogrel (PLAVIX) tablet 75 mg     cyclobenzaprine (FLEXERIL) tablet 10 mg     glucose gel 15-30 g    Or     dextrose 50 % injection 25-50 mL    Or     glucagon injection 1 mg     glucose gel 15-30 g    Or     dextrose 50 % injection 25-50 mL    Or     glucagon injection 1 mg     enoxaparin ANTICOAGULANT (LOVENOX) injection 40 mg     fexofenadine (ALLEGRA) tablet 180 mg     folic acid (FOLVITE) tablet 1 mg     lisinopril (ZESTRIL) tablet 20 mg    And     hydrochlorothiazide (HYDRODIURIL) tablet 25 mg     HYDROmorphone (PF) (DILAUDID) injection 0.3-0.5 mg     hydroxychloroquine (PLAQUENIL) tablet 400 mg     insulin aspart  "(NovoLOG) injection (RAPID ACTING)     insulin aspart (NovoLOG) injection (RAPID ACTING)     insulin glargine (LANTUS PEN) injection 58 Units     lidocaine (LMX4) cream     lidocaine (XYLOCAINE) 2 % 15 mL, alum & mag hydroxide-simethicone (MAALOX) 15 mL GI Cocktail     lidocaine 1 % 0.1-1 mL     magnesium oxide (MAG-OX) half-tab 200 mg     melatonin tablet 1 mg     metoprolol succinate ER (TOPROL-XL) 24 hr tablet 100 mg     montelukast (SINGULAIR) tablet 10 mg     naloxone (NARCAN) injection 0.2 mg    Or     naloxone (NARCAN) injection 0.4 mg    Or     naloxone (NARCAN) injection 0.2 mg    Or     naloxone (NARCAN) injection 0.4 mg     nitroGLYcerin (NITROSTAT) sublingual tablet 0.4 mg     omeprazole (priLOSEC) CR capsule 20 mg     ondansetron (ZOFRAN-ODT) ODT tab 4 mg    Or     ondansetron (ZOFRAN) injection 4 mg     oxyCODONE (ROXICODONE) tablet 5 mg     polyethylene glycol (MIRALAX) Packet 17 g     polyethylene glycol (MIRALAX) Packet 17 g     pravastatin (PRAVACHOL) tablet 40 mg     prochlorperazine (COMPAZINE) injection 10 mg    Or     prochlorperazine (COMPAZINE) tablet 10 mg    Or     prochlorperazine (COMPAZINE) suppository 25 mg     senna-docusate (SENOKOT-S/PERICOLACE) 8.6-50 MG per tablet 1 tablet    Or     senna-docusate (SENOKOT-S/PERICOLACE) 8.6-50 MG per tablet 2 tablet     sodium chloride (PF) 0.9% PF flush 3 mL     sodium chloride (PF) 0.9% PF flush 3 mL     spironolactone (ALDACTONE) tablet 25 mg     sucralfate (CARAFATE) suspension 1 g     vitamin D2 (ERGOCALCIFEROL) 57348 units (1250 mcg) capsule 50,000 Units            Physical Exam:   VS:  BP (!) 162/93 (BP Location: Left arm)   Pulse 70   Temp 97  F (36.1  C) (Oral)   Resp 16   Ht 1.6 m (5' 3\")   Wt 74.8 kg (165 lb)   SpO2 99%   BMI 29.23 kg/m      Wt:   Wt Readings from Last 2 Encounters:   05/25/21 74.8 kg (165 lb)   05/10/21 77.1 kg (169 lb 14.4 oz)      Constitutional: Lying in bed  Eyes: Conjunctiva anicteric  Respiratory: Breathing " comfortably on ambient air  Skin: warm, perfused, no jaundice         Laboratory:     BMP  Recent Labs   Lab Test 05/28/21  0752 05/26/21  1335 05/24/21 2135 05/07/21  1508 10/27/20  1620    140 136  --  138   POTASSIUM 3.5 3.3* 3.3*  --  3.7   CHLORIDE 106 108 100  --  105   KATHY 9.4 9.1 9.9  --  9.4   CO2 27 27 26  --  26   BUN 12 9 19  --  20   CR 1.00 0.95 0.92 0.96 1.13*   * 137* 347*  --  148*     CBC  Recent Labs   Lab Test 05/28/21 0752 05/26/21  1335 05/24/21 2135 05/07/21  1508   WBC 9.0 8.9 13.5* 8.9   RBC 4.74 4.52 5.39* 4.89   HGB 12.5 11.7 13.9 12.7   HCT 36.7 35.7 41.8 39.7   MCV 77* 79 78 81   MCH 26.4* 25.9* 25.8* 26.0*   MCHC 34.1 32.8 33.3 32.0   RDW 12.8 13.3 13.0 13.7    320 416 336     Liver Function Studies   Recent Labs   Lab Test 05/28/21 0752   PROTTOTAL 6.9   ALBUMIN 3.7   BILITOTAL 0.4   ALKPHOS 95   AST 20   ALT 28      Imaging/Procedures/Studies:   No imaging in the last 24 hours

## 2021-05-29 ENCOUNTER — PATIENT OUTREACH (OUTPATIENT)
Dept: CARE COORDINATION | Facility: CLINIC | Age: 56
End: 2021-05-29

## 2021-06-01 NOTE — PROGRESS NOTES
[facility]: Post-Discharge Note  SITUATION                                                      Admission:    Admission Date: 05/24/21   Reason for Admission: Acute on Chronic Abdominal Pain  Discharge:   Discharge Date: 05/28/21  Discharge Diagnosis: Acute on Chronic Abdominal Pain    BACKGROUND                                                      You were admitted with worsening abdominal pain with  nausea which was attributed to an acute flare of your chronic  pancreatitis. While you were in the hospital you had an MRI  which confirmed the diagnosis. You had your diet advanced  over the next several days and you slowly improved. You  labs remained stable and you were tolerating a normal diet  at the time of discharge    ASSESSMENT      Discharge Assessment  Patient reports symptoms are: Unchanged  Does the patient have all of their medications?: Yes  Does patient know what their new medications are for?: Yes  Does patient have a follow-up appointment scheduled?: No  Does patient have any other questions or concerns?: No    Post-op  Did the patient have surgery or a procedure: No  Fever: No  Chills: No  Eating & Drinking: unable to tolerate solid foods  PO Intake: soft foods  Bowel Function: normal  Urinary Status: voiding without complaint/concerns        PLAN                                                      1 Outpatient Plan:  Follow up with Kash Solano MD (Family Medicine) in 1 week (5/31/2021)  2 Follow up with [facility] (EMERGENCY MEDICINE); If symptoms worsen      Future Appointments   Date Time Provider Department Center   6/16/2021 11:00 AM [facility] INFUSION NURSE [facility]   8/20/2021  1:00 PM Majo Mckinnon MD [facility]           [facility]cal[facility]

## 2021-06-02 ENCOUNTER — TELEPHONE (OUTPATIENT)
Dept: GASTROENTEROLOGY | Facility: CLINIC | Age: 56
End: 2021-06-02

## 2021-06-02 NOTE — TELEPHONE ENCOUNTER
Called Pt to schedule her for a follow up appt with Dr. Epstein post hospitalization. Per Pt, she will be seeing a different doctor and deferred scheduling appt at this time.    Mahesh Arguelles LPN

## 2021-06-16 ENCOUNTER — INFUSION THERAPY VISIT (OUTPATIENT)
Dept: INFUSION THERAPY | Facility: CLINIC | Age: 56
End: 2021-06-16
Attending: INTERNAL MEDICINE
Payer: COMMERCIAL

## 2021-06-16 VITALS
HEART RATE: 88 BPM | OXYGEN SATURATION: 99 % | DIASTOLIC BLOOD PRESSURE: 94 MMHG | BODY MASS INDEX: 29.72 KG/M2 | WEIGHT: 167.8 LBS | TEMPERATURE: 99 F | SYSTOLIC BLOOD PRESSURE: 163 MMHG | RESPIRATION RATE: 18 BRPM

## 2021-06-16 DIAGNOSIS — I77.82 ANCA-ASSOCIATED VASCULITIS (H): ICD-10-CM

## 2021-06-16 DIAGNOSIS — M31.30 GRANULOMATOSIS WITH POLYANGIITIS WITHOUT RENAL INVOLVEMENT (H): Primary | ICD-10-CM

## 2021-06-16 PROCEDURE — 96375 TX/PRO/DX INJ NEW DRUG ADDON: CPT

## 2021-06-16 PROCEDURE — 96365 THER/PROPH/DIAG IV INF INIT: CPT

## 2021-06-16 PROCEDURE — 96366 THER/PROPH/DIAG IV INF ADDON: CPT

## 2021-06-16 PROCEDURE — 250N000013 HC RX MED GY IP 250 OP 250 PS 637: Performed by: INTERNAL MEDICINE

## 2021-06-16 PROCEDURE — 96367 TX/PROPH/DG ADDL SEQ IV INF: CPT

## 2021-06-16 PROCEDURE — 250N000011 HC RX IP 250 OP 636: Performed by: INTERNAL MEDICINE

## 2021-06-16 PROCEDURE — 258N000003 HC RX IP 258 OP 636: Performed by: INTERNAL MEDICINE

## 2021-06-16 RX ORDER — METHYLPREDNISOLONE SODIUM SUCCINATE 125 MG/2ML
125 INJECTION, POWDER, LYOPHILIZED, FOR SOLUTION INTRAMUSCULAR; INTRAVENOUS ONCE
Status: COMPLETED | OUTPATIENT
Start: 2021-06-16 | End: 2021-06-16

## 2021-06-16 RX ORDER — METHYLPREDNISOLONE SODIUM SUCCINATE 125 MG/2ML
125 INJECTION, POWDER, LYOPHILIZED, FOR SOLUTION INTRAMUSCULAR; INTRAVENOUS ONCE
Status: CANCELLED
Start: 2021-06-16

## 2021-06-16 RX ORDER — ACETAMINOPHEN 325 MG/1
650 TABLET ORAL ONCE
Status: COMPLETED | OUTPATIENT
Start: 2021-06-16 | End: 2021-06-16

## 2021-06-16 RX ORDER — ACETAMINOPHEN 325 MG/1
650 TABLET ORAL ONCE
Status: CANCELLED
Start: 2021-06-16

## 2021-06-16 RX ADMIN — RITUXIMAB-ABBS 1000 MG: 10 INJECTION, SOLUTION INTRAVENOUS at 12:24

## 2021-06-16 RX ADMIN — DIPHENHYDRAMINE HYDROCHLORIDE 50 MG: 50 INJECTION, SOLUTION INTRAMUSCULAR; INTRAVENOUS at 11:42

## 2021-06-16 RX ADMIN — METHYLPREDNISOLONE SODIUM SUCCINATE 125 MG: 125 INJECTION, POWDER, FOR SOLUTION INTRAMUSCULAR; INTRAVENOUS at 11:39

## 2021-06-16 RX ADMIN — ACETAMINOPHEN 650 MG: 325 TABLET ORAL at 11:33

## 2021-06-16 NOTE — LETTER
6/16/2021         RE: Janine Cornell  [address] [address]        Dear Colleague,    Thank you for referring your patient, Janine Cornell, to the [facility]. Please see a copy of my visit note below.    Nursing Note  Janine Cornell [facility] for: Rituxan  During today's [facility] appointment, orders from Dr. Mckinnon were completed.  Frequency: once (pt has received previously)  Progress note:  Patient identification verified by name and date of birth.  Assessment completed.  Vitals recorded in Doc Flowsheets.  Patient was provided with education regarding medication/procedure and possible side effects.  Patient verbalized understanding.     present during visit today: Not Applicable.    Treatment Conditions: ~~~ NOTE: If the patient answers yes to any of the questions below, hold the infusion and contact ordering provider or on-call provider.    1. Have you recently had an elevated temperature, fever, chills, productive cough, coughing for 3 weeks or longer or hemoptysis, abnormal vital signs, night sweats,  chest pain or have you noticed a decrease in your appetite, unexplained weight loss or fatigue? No  2. Do you have any open wounds or new incisions? No  3. Do you have any recent or upcoming hospitalizations, surgeries or dental procedures? No  4. Do you currently have or recently have had any signs of illness or infection or are you on any antibiotics? No  5. Have you had any new, sudden or worsening abdominal pain? No  6. Have you or anyone in your household received a live vaccination in the past 4 weeks? Please note:  No live vaccines while on biologic/chemotherapy until 6 months after the last treatment.  Patient can receive the flu vaccine (shot only) and the pneumovax.  It is optimal for the patient to get these vaccines mid cycle, but they can be given  at any time as long as it is not on the day of the infusion. No  7. Have you recently been diagnosed with any new nervous system diseases (ie. Multiple sclerosis, Guillain [place], seizures, neurological changes) or cancer diagnosis? No  8. Are you on any form of radiation or chemotherapy? No  9. Are you pregnant or breast feeding or do you have plans of pregnancy in the future? No  10. Have you been having any signs of worsening depression or suicidal ideations?  (benlysta only) No  11. Have there been any other new onset medical symptoms? No      Premedications: administered per order.    Drug Waste Record: No    Infusion length and rate:  infusion starts at 50 ml/hr, then increased by 50 ml/hr every 30 minutes to final rate of 200 ml/hr. (per pt preference based on previous history)    Labs: were not ordered for this appointment.    Vascular access: peripheral IV placed today.    Is the next appt scheduled? Not needed for now   Asymptomatic COVID test completed? no    Post Infusion Assessment:  Patient tolerated infusion without incident.     Discharge Plan:   Follow up plan of care with: ongoing infusions at [facility].  Discharge instructions were reviewed with patient.  Patient/representative verbalized understanding of discharge instructions and all questions answered.  Patient discharged from [facility] in stable condition.    Evelin Campbell RN       Administrations This Visit     acetaminophen (TYLENOL) tablet 650 mg     Admin Date  06/16/2021 Action  Given Dose  650 mg Route  Oral Administered By  Evelin Campbell RN          diphenhydrAMINE (BENADRYL) 50 mg in sodium chloride 0.9 % 51 mL intermittent infusion     Admin Date  06/16/2021 Action  New Bag Dose  50 mg Rate  204 mL/hr Route  Intravenous Administered By  Evelin Campbell RN          methylPREDNISolone sodium succinate (solu-MEDROL) injection 125 mg     Admin Date  06/16/2021  Action  Given Dose  125 mg Route  Intravenous Administered By  Evelin Campbell RN          riTUXimab-abbs (TRUXIMA) 1,000 mg in sodium chloride 0.9 % 1,000 mL infusion for NON-oncology use     Admin Date  06/16/2021 Action  New Bag Dose  1,000 mg Route  Intravenous Administered By  Evelin Campbell RN                Again, thank you for allowing me to participate in the care of your patient.        Sincerely,        [facility]

## 2021-06-16 NOTE — PROGRESS NOTES
Nursing Note  Janine CHELLE Cornell presents today [facility] for: Rituxan  During [facility] appointment, orders from Dr. Mckinnon were completed.  Frequency: once (pt has received previously)  Progress note:  Patient identification verified by name and date of birth.  Assessment completed.  Vitals recorded in Doc Flowsheets.  Patient was provided with education regarding medication/procedure and possible side effects.  Patient verbalized understanding.     present during visit today: Not Applicable.    Treatment Conditions: ~~~ NOTE: If the patient answers yes to any of the questions below, hold the infusion and contact ordering provider or on-call provider.    1. Have you recently had an elevated temperature, fever, chills, productive cough, coughing for 3 weeks or longer or hemoptysis, abnormal vital signs, night sweats,  chest pain or have you noticed a decrease in your appetite, unexplained weight loss or fatigue? No  2. Do you have any open wounds or new incisions? No  3. Do you have any recent or upcoming hospitalizations, surgeries or dental procedures? No  4. Do you currently have or recently have had any signs of illness or infection or are you on any antibiotics? No  5. Have you had any new, sudden or worsening abdominal pain? No  6. Have you or anyone in your household received a live vaccination in the past 4 weeks? Please note:  No live vaccines while on biologic/chemotherapy until 6 months after the last treatment.  Patient can receive the flu vaccine (shot only) and the pneumovax.  It is optimal for the patient to get these vaccines mid cycle, but they can be given at any time as long as it is not on the day of the infusion. No  7. Have you recently been diagnosed with any new nervous system diseases (ie. Multiple sclerosis, Guillain [place], seizures, neurological changes) or cancer diagnosis? No  8. Are you on any form of  radiation or chemotherapy? No  9. Are you pregnant or breast feeding or do you have plans of pregnancy in the future? No  10. Have you been having any signs of worsening depression or suicidal ideations?  (benlysta only) No  11. Have there been any other new onset medical symptoms? No      Premedications: administered per order.    Drug Waste Record: No    Infusion length and rate:  infusion starts at 50 ml/hr, then increased by 50 ml/hr every 30 minutes to final rate of 200 ml/hr. (per pt preference based on previous history)    Labs: were not ordered for this appointment.    Vascular access: peripheral IV placed today.    Is the next appt scheduled? Not needed for now   Asymptomatic COVID test completed? no    Post Infusion Assessment:  Patient tolerated infusion without incident.     Discharge Plan:   Follow up plan of care with: ongoing infusions at [facility].  Discharge instructions were reviewed with patient.  Patient/representative verbalized understanding of discharge instructions and all questions answered.  Patient discharged [facility] in stable condition.    Evelin Campbell RN       Administrations This Visit     acetaminophen (TYLENOL) tablet 650 mg     Admin Date  06/16/2021 Action  Given Dose  650 mg Route  Oral Administered By  Evelin Campbell RN          diphenhydrAMINE (BENADRYL) 50 mg in sodium chloride 0.9 % 51 mL intermittent infusion     Admin Date  06/16/2021 Action  New Bag Dose  50 mg Rate  204 mL/hr Route  Intravenous Administered By  Evelin Campbell RN          methylPREDNISolone sodium succinate (solu-MEDROL) injection 125 mg     Admin Date  06/16/2021 Action  Given Dose  125 mg Route  Intravenous Administered By  Evelin Campbell RN          riTUXimab-abbs (TRUXIMA) 1,000 mg in sodium chloride 0.9 % 1,000 mL infusion for NON-oncology use     Admin Date  06/16/2021 Action  New Bag Dose  1,000 mg Route  Intravenous  Administered By  Evelin Campbell RN

## 2021-06-17 DIAGNOSIS — M19.90 INFLAMMATORY ARTHRITIS: ICD-10-CM

## 2021-06-17 DIAGNOSIS — E55.9 VITAMIN D DEFICIENCY: ICD-10-CM

## 2021-06-17 NOTE — LETTER
Janine Cornell  [address] [address]    Dear Janine Cornell,     Regular eye exams are required while taking hydroxychloroquine (Plaquenil). Eye exams should be completed by an eye specialist who is experienced in monitoring for hydroxychloroquine toxicity (a rare effect of the drug that can damage your eyes and vision).  These may be yearly or as determined by your eye specialist.     Although vision problems and loss of sight while taking hydroxychloroquine are very rare, notify your doctor immediately if you notice changes in your vision. The goal of screening is to detect toxicity before your vision is significantly or noticeably impacted. Failing to get proper screening exams puts you at risk of vision changes which may or may not be reversible.    Per the American Academy of Ophthalmology recommendations (2016), screening tests performed may include a 10-2 visual field test, spectral-domain optical coherence tomography (SD OCT), or other screening tests as determined by the eye specialist, including a multifocal electroretinogram (mfERG) or fundus auto-fluorescence (FAF).    We received a refill request from your pharmacy. A copy of your current eye exam was not found in your medical record. Your hydroxychloroquine prescription has been refilled with a limited supply pending confirmation you have had an eye exam to test for hydroxychloroquine toxicity.      We encourage you to bring this letter to your eye exam to discuss the exams that will be performed during your visit. Please request your eye clinic fax or mail a copy of your eye exam report to our clinic indicating that testing was completed for hydroxychloroquine toxicity screening. The exam notes must specifically comment on the potential for hydroxychloroquine toxicity and outline recommended follow-up.    If you have questions about hydroxychloroquine or the information in this letter, please call the clinic at [phone] or talk to  your provider at your next office visit.                        2    Eye Specialist Letter  Dear Eye Specialist,  To ensure safe use of Plaquenil (hydroxychloroquine), we are requesting your assistance in providing the following information. Please return this form to our clinic via mail or fax, or incorporate this information into your visit summary. Your note must indicate whether or not there is evidence of toxicity from Plaquenil use. For questions regarding this form, please call [phone].  Patient Name:   :             Date of Exam:    The following exams were completed during this visit in accordance with the American Academy of Ophthalmology recommendations (2016):  ? 10-2 automated visual field  ? Spectral-domain optical coherence tomography (SD OCT)  ? Multifocal electroretinogram (mfERG)  ? Fundus autofluorescence (FAF)  ? Other (please specify)  Please select from the following:  ? The findings from the above exams are not suggestive of toxicity from Plaquenil (hydroxychloroquine).  ? The findings from the above exams may suggest toxicity from Plaquenil (hydroxychloroquine).  Directly contact the clinic and fax this form.  Please provide additional guidance on whether or not the medication may be continued from your perspective:  Date of next recommended Plaquenil (hydroxychloroquine) screening eye exam:   ? 5 years  ? 1 year  ? 6 months  Other (please specify):   Eye Specialist Name (print):                                                                Date:  Eye Specialist Signature:

## 2021-06-18 RX ORDER — MULTIVITAMIN WITH FOLIC ACID 400 MCG
1 TABLET ORAL DAILY
Qty: 90 TABLET | Refills: 0 | OUTPATIENT
Start: 2021-06-18

## 2021-06-18 RX ORDER — ERGOCALCIFEROL 1.25 MG/1
50000 CAPSULE, LIQUID FILLED ORAL
Qty: 12 CAPSULE | Refills: 0 | OUTPATIENT
Start: 2021-06-18

## 2021-06-18 NOTE — TELEPHONE ENCOUNTER
hydroxychloroquine (PLAQUENIL) 200 MG tablet      Last Written Prescription Date:  4-22-21  Last Fill Quantity: 180,   # refills: 0  Last Office Visit : 5-10-21  Future Office visit:  8-20-21   last eye exam: 1-6-20  Eye letter sent      Routing refill request to provider for review/approval because:  Overdue eye exam      DUPLICATE: RF DENIED  Multiple Vitamin (DAILY-GERALD) TABS  Last Rx: 5-10-21 for 90 tab w/0RF  vitamin D2 (ERGOCALCIFEROL) 06100 units (1250 mcg) capsule  last rx: 5-10-21 for 12 tab w/0RF

## 2021-06-20 RX ORDER — HYDROXYCHLOROQUINE SULFATE 200 MG/1
400 TABLET, FILM COATED ORAL DAILY
Qty: 180 TABLET | Refills: 0 | Status: SHIPPED | OUTPATIENT
Start: 2021-06-20 | End: 2021-12-16

## 2021-06-30 NOTE — DISCHARGE SUMMARY
[facility]  Hospitalist Discharge Summary      Date of Admission:  5/24/2021  Date of Discharge:  5/28/2021  2:53 PM  Discharging Provider: Jas Crow MD  Discharge Team: Hospitalist Service, Gold 10    Discharge Diagnoses   Acute on chronic pancreatitis  PCOS  GPA  Ischemic cardiomyopathy with normal cardiac function  CAD  Moderate persistent asthma  RA  DM2  HTN  HLD      Follow-ups Needed After Discharge   Follow-up Appointments     Adult [facility] Follow-up and recommended labs and tests      Follow up with primary care provider, Kash Solano, within 7 days   for hospital follow- up.  No follow up labs or test are needed.    Additionally, follow up with GI in 2-4 weeks to discuss an ERCP.       Appointments on [place] and/or [facility] (with [facility] or [facility]   provider or service). Call [phone] if you haven't heard regarding   these appointments within 7 days of discharge.             Unresulted Labs Ordered in the Past 30 Days of this Admission     No orders found from 4/24/2021 to 5/25/2021.      These results will be followed up by N/A    Discharge Disposition   Discharged to home  Condition at discharge: Stable    Hospital Course   Janine Cornell is a 55 year old female admitted on 5/24/2021. 54 yo F with PCOS, Chronic Idiopathic Pancreatitis, GPA, HFrEF (35%),CAD on DAPT, HTN, HLP, T2DM, Fibromyalgia. To [facility] ED with eipgastric pain and intolerance to PO for several weeks worse with eating. Lipase ~400. CT showing pancreatic head inflammation c/w pancreatitis.     Acute on Chronic Idiopathic Pancreatitis - epigastric pain and imaging positive. Has had an extensive workup of her chronic postprandial abdominal pain, bloating, and early satiety including gastric emptying study (3/09) and MRI/MRCP (3/09) in addition to EGD (10/08) and endoscopic ultrasound (11/08).  MRCP was suggestive of idiopathic chronic pancreatitis. Unable to take  "Creon due to \"allergy to pork in the capsules\"  Her pain slowly improved and she was able to take in a regular diet with oral pain medications, and felt safe to discharge with close outpatient followup.       History of Cholecystectomy  - Panc/Bili GI Consult, last seen by Dr. Marcus 2009 but has had chronic symptoms persistently since  - Clear liquid diet ADAT  - MRI pancreas (with secretin) consistent with the diagnosis  - Will need EUS +/- ERCP in 4 weeks after acute phase has resolved.   - hold tramadol in lieu of dilauded     CAD s/p PCI to LAD,D2 and Prox RCA 11/2/11  History of NSTEMI  Ischemic Cardiomyopathy, HF with recovered EF - EF 35-40% 10/31/11, Echo on 7/2018 normal  Recent episode of atypical chest pain.   - continue PTA DAPT, pravastatin 40 mg daily  - Metoprolol succinate 100 mg po daily  - ACEi as part of antihypertensive combo pill noted below  - spironolactone 25 mg daily  - Nitroglycerine as home med, will order if having anginal symptoms  - Echo is reassuring     Moderate Persistent Asthma   Allergic Rhinitis  - Continue fluticasone-vilanterol (Breo Ellipta)  - Continue Albuterol MDI and Nebs PRN  - Continue Singulair, Triamcinolone Nasal Spray, fexofenidine     Rheumatoid Arthritis - Seropositive non-erosive RA (arthritis, AM stiffness, high CRP, RF 26 but re-check neg 3/2015 on HCQ) Dx 5/2013  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 Shah and Dr. Valdez at [place]. 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  Type 2 Diabetes Mellitus - hold home metformin, medium resistance sliding scale insulin  Hypertension - continue Lisinopril-hydrochlorothiazide (20-25 mg daily),   Hyperlipidemia - statin as above  Iron Deficiency Anemia - on iron replacement  Vitamin D deficiency - continue VitD replacement  Polycystic Ovarian Syndrome - hold metformin, treat BG as " above  Xerophthalmia, OD - continue Restasis   Fibromyalgia / IBS - hold dicyclomine    Consultations This Hospital Stay   GI PANCREATICOBILIARY ADULT IP CONSULT  CARE MANAGEMENT / SOCIAL WORK IP CONSULT    Code Status   Full Code    Time Spent on this Encounter   I, Jas Crow MD, personally saw the patient today and spent less than or equal to 30 minutes discharging this patient.       Jas Crow MD  [facility] [address] [place] [address]  Phone: [phone]  ______________________________________________________________________    Physical Exam   Vital Signs:                   Weight: 165 lbs 0 oz   Vitals reviewed  General Appearance: NAD  Respiratory: CTA b/l  Cardiovascular: RRR S1/S2, no m,r,g  GI: soft, NT, ND, +BS  Skin: no rashes  Other: No edema        Primary Care Physician   Kash Solano    Discharge Orders      Reason for your hospital stay    You were admitted with worsening abdominal pain with nausea which was attributed to an acute flare of your chronic pancreatitis. While you were in the hospital you had an MRI which confirmed the diagnosis.  You had your diet advanced over the next several days and you slowly improved.  You labs remained stable and you were tolerating a normal diet at the time of discharge     Adult [facility] Follow-up and recommended labs and tests    Follow up with primary care provider, Kash Solano, within 7 days for hospital follow- up.  No follow up labs or test are needed.  Additionally, follow up with GI in 2-4 weeks to discuss an ERCP.       Appointments on [place] and/or [facility] (with [facility] or [facility] provider or service). Call [phone] if you haven't heard regarding these appointments within 7 days of discharge.     Activity    Your activity upon discharge: activity as tolerated     When to contact your care team    Call your primary doctor if you have any of the following: fever,  chills, worsening chest pain, worsening abdominal pain that does not keshawn, nausea/vomiting, or other symptoms.     Discharge Instructions    Take miralax twice a day while needing pain meds. Follow up with both GI to discuss the possible risks and benefits of an ERCP and discuss with your PCP about your shoulder pain.     Full Code     Diet    Follow this diet upon discharge: Orders Placed This Encounter      Snacks/Supplements Adult: Other; Pt may order supplements PRN; Between Meals      Regular Diet Adult       Significant Results and Procedures   Most Recent 3 CBC's:  Recent Labs   Lab Test 05/28/21  0752 05/26/21  1335 05/24/21  2135   WBC 9.0 8.9 13.5*   HGB 12.5 11.7 13.9   MCV 77* 79 78    320 416     Most Recent 3 BMP's:  Recent Labs   Lab Test 05/28/21  0752 05/26/21  1335 05/24/21  2135    140 136   POTASSIUM 3.5 3.3* 3.3*   CHLORIDE 106 108 100   CO2 27 27 26   BUN 12 9 19   CR 1.00 0.95 0.92   ANIONGAP 5 5 10   KATHY 9.4 9.1 9.9   * 137* 347*     Most Recent 2 LFT's:  Recent Labs   Lab Test 05/28/21  0752 05/26/21  1335   AST 20 24   ALT 28 32   ALKPHOS 95 87   BILITOTAL 0.4 0.6       Discharge Medications   Discharge Medication List as of 5/28/2021  1:11 PM      START taking these medications    Details   oxyCODONE (ROXICODONE) 5 MG tablet Take 1 tablet (5 mg) by mouth every 6 hours as needed for moderate to severe pain or severe pain (use before dilaudid), Disp-16 tablet, R-0, Local Print      sucralfate (CARAFATE) 1 GM/10ML suspension Take 10 mLs (1 g) by mouth 4 times daily (before meals and nightly), Disp-1200 mL, R-0, E-Prescribe      famotidine (PEPCID) 20 MG tablet Take 1 tablet (20 mg) by mouth 2 times daily as needed (abdominal pain), Disp-20 tablet, R-0, Local Print      HYDROcodone-acetaminophen (NORCO) 5-325 MG tablet Take 1 tablet by mouth every 6 hours as needed for severe pain, Disp-10 tablet, R-0, Local Print      ondansetron (ZOFRAN) 4 MG tablet Take 1 tablet (4 mg)  by mouth every 8 hours as needed, Disp-15 tablet, R-0, Local Print      polyethylene glycol (MIRALAX) 17 GM/Dose powder Take 17 g by mouth daily, Disp-510 g, No Print Out         CONTINUE these medications which have NOT CHANGED    Details   diphenhydrAMINE (BENADRYL) 25 MG capsule Take 1 capsule (25 mg) by mouth nightly as needed for itching or allergies, Disp-30 capsule, R-2, E-Prescribe      insulin glargine (LANTUS PEN) 100 UNIT/ML pen Inject 58 Units Subcutaneous every morning, HistoricalIf Lantus is not covered by insurance, may substitute Basaglar at same dose and frequency.        traMADol (ULTRAM) 50 MG tablet Take 1 tablet (50 mg) by mouth every 8 hours as needed for moderate pain, Disp-60 tablet, R-3, E-Prescribe      vitamin D2 (ERGOCALCIFEROL) 94245 units (1250 mcg) capsule Take 1 capsule (50,000 Units) by mouth every 7 days, Disp-12 capsule, R-0, E-Prescribe      acetaminophen (TYLENOL) 325 MG tablet Take 1-2 tablets (325-650 mg) by mouth every 6 hours as needed for pain (headache), Disp-250 tablet, R-4, E-Prescribe      albuterol (2.5 MG/3ML) 0.083% neb solution INL 1 VIAL VIA NEBULIZATION Q 4 TO 6 HOURS PRN, R-1, Historical      albuterol (PROAIR HFA, PROVENTIL HFA, VENTOLIN HFA) 108 (90 BASE) MCG/ACT inhaler Inhale 2 puffs into the lungs every 6 hours as needed for shortness of breath / dyspnea or wheezing, Disp-3 Inhaler, R-1, E-PrescribeProfile Rx: patient will contact pharmacy when needed      ASPIRIN LOW DOSE 81 MG EC tablet TAKE 1 TABLET(81 MG) BY MOUTH DAILY, Disp-30 tablet,R-11, E-Prescribe      blood glucose monitoring (NO BRAND SPECIFIED) meter device kit Use to test blood sugar 4 X times daily or as directed.Disp-1 kit, [id number]      blood glucose monitoring (NO BRAND SPECIFIED) test strip 1 strip by In Vitro route 4 times daily Test as directed. Please dispense three months and three months of refills. Thank you., Disp-360 each, R-3, E-Prescribe      Blood Pressure Monitor KIT 1  each daily Monitor home blood pressure as instructed by physician.  Dispense [place] blood pressure kit., Disp-1 kit, R-0, Local Print      calcium carbonate (TUMS) 500 MG chewable tablet Take 3-4 tablets (1,500-2,000 mg) by mouth daily as needed, Disp-90 tablet, R-3, E-Prescribe      clopidogrel (PLAVIX) 75 MG tablet Take 1 tablet (75 mg) by mouth daily, Disp-30 tablet,R-11, E-Prescribe      COMPRESSION STOCKINGS 2 each daily Apply one 10-15 mmHg compression stocking to each lower extgmierty during the day and remove before bedtime., Disp-4 each, R-2, Local Print      cyclobenzaprine (FLEXERIL) 10 MG tablet Take 1 tablet (10 mg) by mouth 2 times daily as needed for muscle spasms, Disp-60 tablet, R-3, E-Prescribe      dicyclomine (BENTYL) 20 MG tablet TAKE 1 TABLET(20 MG) BY MOUTH FOUR TIMES DAILY AS NEEDED. Pls schedule clinic appt for refills., Disp-60 tablet, R-0, E-Prescribe      EPIPEN 2-RIKY 0.3 MG/0.3ML injection INJECT 0.3 MG INTO THE MUSCLE PRF ANAPHYALAXIS, R-0, MINA, Historical      fexofenadine (ALLEGRA) 180 MG tablet Take 1 tablet by mouth daily as needed., Disp-90 tablet, R-3, E-Prescribe      fluocinolone (SYNALAR) 0.01 % solution Apply topically daily as neededHistorical      fluocinonide (LIDEX) 0.05 % external ointment Apply topically as neededHistorical      folic acid (FOLVITE) 1 MG tablet Take 1 tablet by mouth daily, Disp-90 tablet, R-3, E-Prescribe      insulin pen needle (BD MARCK U/F) 32G X 4 MM USE DAILY OR AS DIRECTEDDisp-300 each, [id number]      ketoconazole (NIZORAL) 2 % shampoo Apply topically daily as neededHistorical      lisinopril-hydrochlorothiazide (ZESTORETIC) 20-25 MG tablet Take 1 tablet by mouth daily, Disp-30 tablet, R-6, E-Prescribe      Magnesium Oxide -Mg Supplement 250 MG tablet Take 1 tablet (250 mg) by mouth 2 times daily, Disp-60 tablet, R-3, E-Prescribe      metFORMIN (GLUCOPHAGE-XR) 500 MG 24 hr tablet Take 2,000 mg by mouth daily (with dinner), Historical       metoprolol succinate ER (TOPROL-XL) 100 MG 24 hr tablet Take 1 tablet (100 mg) by mouth daily, Disp-30 tablet,R-11, E-Prescribe      montelukast (SINGULAIR) 10 MG tablet Take 1 tablet (10 mg) by mouth At Bedtime, Disp-30 tablet, R-1, E-Prescribe      Multiple Vitamin (DAILY-GERALD) TABS Take 1 tablet by mouth daily, Disp-90 tablet, R-0, E-Prescribe      nitroGLYcerin (NITROSTAT) 0.4 MG sublingual tablet Place 1 tablet (0.4 mg) under the tongue every 5 minutes as needed for chest pain . After 3 doses, if pain persists call 911., Disp-25 tablet,R-3, E-Prescribe      pravastatin (PRAVACHOL) 40 MG tablet Take 1 tablet (40 mg) by mouth daily, Disp-30 tablet,R-11, E-Prescribe      sennosides (SENOKOT) 8.6 MG tablet 1-2 tabs a day as needed for constipation, Disp-60 tablet, R-1, E-Prescribe      spironolactone (ALDACTONE) 25 MG tablet Take 1 tablet (25 mg) by mouth daily TAKE 1 TABLET BY MOUTH EVERY DAY TAKE ADDITIONAL 1/2 TABLET BY MOUTH EVERY DAY AS NEEDED FOR WEIGHT GAIN, Disp-30 tablet,R-11, E-Prescribe      hydroxychloroquine (PLAQUENIL) 200 MG tablet Take 2 tablets (400 mg) by mouth daily (Annual eye exam/plaquenil screening), Disp-180 tablet, R-0, E-Prescribe         STOP taking these medications       ferrous gluconate (FERGON) 324 (38 Fe) MG tablet Comments:   Reason for Stopping:             Allergies   Allergies   Allergen Reactions     Amoxicillin-Pot Clavulanate      Augmentin      Unknown possible hives and edema     Azithromycin      Diatrizoate Other (See Comments)     Pt wants this listed because she is allergic to shellfish      Imitrex [Sumatriptan]      Severe face/neck/chest tightness and flushing side effects      Penicillins Hives     Unknown      Pork Allergy      Stomach pain, cramping, diarrhea, itching, nausea and headaches     Shellfish Allergy Hives and Swelling     Sulfa Drugs Hives and Swelling     Zithromax [Azithromycin Dihydrate] Swelling     Unknown

## 2021-07-09 ENCOUNTER — TELEPHONE (OUTPATIENT)
Dept: CARDIOLOGY | Facility: CLINIC | Age: 56
End: 2021-07-09

## 2021-07-09 DIAGNOSIS — I10 HYPERTENSION, UNSPECIFIED TYPE: ICD-10-CM

## 2021-07-09 DIAGNOSIS — I10 BENIGN ESSENTIAL HYPERTENSION: ICD-10-CM

## 2021-07-09 DIAGNOSIS — E78.5 HYPERLIPIDEMIA LDL GOAL <70: ICD-10-CM

## 2021-07-09 DIAGNOSIS — I25.10 CORONARY ARTERY DISEASE INVOLVING NATIVE HEART WITHOUT ANGINA PECTORIS, UNSPECIFIED VESSEL OR LESION TYPE: ICD-10-CM

## 2021-07-09 DIAGNOSIS — I10 HYPERTENSION GOAL BP (BLOOD PRESSURE) < 140/90: ICD-10-CM

## 2021-07-09 DIAGNOSIS — I10 HTN (HYPERTENSION): ICD-10-CM

## 2021-07-09 DIAGNOSIS — I21.4 NSTEMI (NON-ST ELEVATED MYOCARDIAL INFARCTION) (H): ICD-10-CM

## 2021-07-09 NOTE — TELEPHONE ENCOUNTER
[facility] Call Center    Phone Message    May a detailed message be left on voicemail: yes     Reason for Call: Other: Pt calling because she has some questions regarding her medications. She reports that they do not all have the same amount of refills and she has been running out of some medications prior to the refill. She specified that she has been taking spironolactone, and the prescription states take an extra 1/2 tablet if necessary but when she does that, she ends up running out prior to her refill. She would like a call back to discuss her medication refills and if she needs an appointment with .    Action Taken: Message routed to:  [facility] cardio    Travel Screening: Not Applicable

## 2021-07-13 ENCOUNTER — TELEPHONE (OUTPATIENT)
Dept: CARDIOLOGY | Facility: CLINIC | Age: 56
End: 2021-07-13

## 2021-07-13 ENCOUNTER — TELEPHONE (OUTPATIENT)
Dept: RHEUMATOLOGY | Facility: CLINIC | Age: 56
End: 2021-07-13

## 2021-07-13 DIAGNOSIS — E78.5 HYPERLIPIDEMIA LDL GOAL <70: ICD-10-CM

## 2021-07-13 DIAGNOSIS — I10 HYPERTENSION, UNSPECIFIED TYPE: ICD-10-CM

## 2021-07-13 DIAGNOSIS — I10 HTN (HYPERTENSION): ICD-10-CM

## 2021-07-13 DIAGNOSIS — M31.30 GRANULOMATOSIS WITH POLYANGIITIS WITHOUT RENAL INVOLVEMENT (H): Primary | ICD-10-CM

## 2021-07-13 RX ORDER — METOPROLOL SUCCINATE 100 MG/1
100 TABLET, EXTENDED RELEASE ORAL DAILY
Qty: 90 TABLET | Refills: 0 | Status: SHIPPED | OUTPATIENT
Start: 2021-07-13 | End: 2021-07-20

## 2021-07-13 RX ORDER — PRAVASTATIN SODIUM 40 MG
40 TABLET ORAL DAILY
Qty: 90 TABLET | Refills: 0 | Status: SHIPPED | OUTPATIENT
Start: 2021-07-13 | End: 2021-07-20

## 2021-07-13 RX ORDER — CLOPIDOGREL BISULFATE 75 MG/1
75 TABLET ORAL DAILY
Qty: 90 TABLET | Refills: 0 | Status: SHIPPED | OUTPATIENT
Start: 2021-07-13 | End: 2021-07-20

## 2021-07-13 RX ORDER — SPIRONOLACTONE 25 MG/1
25 TABLET ORAL DAILY
Qty: 135 TABLET | Refills: 0 | Status: SHIPPED | OUTPATIENT
Start: 2021-07-13 | End: 2021-07-20

## 2021-07-13 NOTE — TELEPHONE ENCOUNTER
[facility] Call Center    Phone Message    May a detailed message be left on voicemail: no     Reason for Call: Other: Janine called to schedule a telephone appointment with Dr Peace on 7/19/2021 at 6:30pm. She was in the hospital the end of May and numerous lab draws were done, she would like to know if a lab appointment is still necessary prior to her telephone visit on 7/19/2021. Also, Janine would like a refill on 2 more Rx's. aspirin (ASPIRIN LOW DOSE) 81 MG EC tablet and lisinopril-hydrochlorothiazide (ZESTORETIC) 20-25 MG tablet. Please reach out to Janine to provide clarification on what labs are required.      Action Taken: Message routed to:  [facility] Cardiology    Travel Screening: Not Applicable

## 2021-07-13 NOTE — TELEPHONE ENCOUNTER
Last Clinic Visit: Cardiology.6/22/20  90 day victorino refill sent to the pharmacy - including instructions for patient to call the clinic and schedule an appointment/labs and B/P check .

## 2021-07-13 NOTE — TELEPHONE ENCOUNTER
Janine calling to report she has not gotten a call back from a nurse. She is needing a call back asap (requesting a call back today) regarding the med. Please reach out to Janine as soon as possible, as she has not gotten a refill on her med and has been off it for awhile. Thank you!

## 2021-07-13 NOTE — TELEPHONE ENCOUNTER
ASPIRIN 81MG EC LOW DOSE TABLETS  Last Written Prescription Date:  7/5/2020  Last Fill Quantity: 30,   # refills: 11  Last Office Visit : 6/22/2021  Future Office visit:  None  30 Tabs, 11 Refills sent to pharm 7/13/2021      Georgia Mercedes RN  Central Triage Red Flags/Med Refills

## 2021-07-13 NOTE — TELEPHONE ENCOUNTER
Dr Paredes, Ophthalmologist from [facility] would like to speak to someone about the pt. Please jd Dr Paredes back at [phone].

## 2021-07-14 ENCOUNTER — TELEPHONE (OUTPATIENT)
Dept: OPHTHALMOLOGY | Facility: CLINIC | Age: 56
End: 2021-07-14

## 2021-07-14 VITALS — BODY MASS INDEX: 29.72 KG/M2 | WEIGHT: 167.77 LBS

## 2021-07-14 NOTE — TELEPHONE ENCOUNTER
Majo Mckinnon MD Beard, Desiree, RN  Caller: Unspecified (Yesterday, 12:29 PM)  Agree that she should stop the HCQ, we wait for Dr. Vernon's evalo decide about resuming, thanks     Left message requesting return call.    Desiree Godinez RN  Rheumatology Clinic

## 2021-07-14 NOTE — TELEPHONE ENCOUNTER
Called and spoke with Dr. Paredes, he completed the OCT on Monday, and it showed some potential early plaquenil changes in the left eye.  OCT on right was stable.  Completed VF testing on Tuesday, showed stable right eye, as she has changes related to her vasculitis in that eye, and nothing in her left eye.  He is concerned about the OCT testing as she has been on Plaquenil for > 5 years now. He is recommending that she see Dr Vernon at the  for FAF testing to evaluate for toxicity. She is not sure that she wants to do this, she would almost rather just stop plaquenil and just look at another drug to use.  Per Dr. Paredes, pt has currently stopped her plaquenil.     Pt remains on rituximab every 4-6 months for vasculitis. And is on plaquenil for her RA. Discussed with atul, she should stop plaquenil at this point and he really recommends that she see ophthalmology here for follow up, there is nothing we can do, but confirm if there is toxicity, but this is how to catch it early so that we can prevent any changes.    Left message requesting pt return call.      Desiree Godinez RN  Rheumatology Clinic

## 2021-07-14 NOTE — TELEPHONE ENCOUNTER
Plaquenil Eye Exam    Date of last eye exam specifically commenting on HCQ toxicity: 7/13/2021  Clinic: [facility]  Phone Number: [phone]  Ophthalmologist: Dr. Paredes  Toxicity: May suggest toxicity, recommend Fundus Autofluorescence with Dr. Vernon at [facility]  Records scanned to chart: Yes    Routing to provider for review.      Beulah Fortune CMA   7/14/2021 9:43 AM

## 2021-07-14 NOTE — TELEPHONE ENCOUNTER
Message received by triage to assist in scheduling an eye exam for possible plaquenil toxicity    Dr. Morelos able to see tomorrow    Called pt at 1200 and pt states had eye exam with Dr. Paredes at [facility] and had questions on why needs an eye exam-- prefers not to schedule at this time    Pt unsure if additional test needed per Dr. Paredes that can be performed here.  Dr. Paredes was to reach out to Dr. Mckinnon to confirm to review the eye exam.    Will ask for clarification with referring clinic.    Reggie Linares RN 12:06 PM 07/14/21

## 2021-07-15 NOTE — TELEPHONE ENCOUNTER
Pt is holding Plaquenil at this time. She is willing to see whoever we need her to here.  She will have FAF done, and then is willing to do whatever is suggested.      She would like Dr. Mckinnon to just review her chart for her hospital admit at the end of May and see if there are any questions.    Desiree Godinez RN  Rheumatology Clinic

## 2021-07-16 RX ORDER — LISINOPRIL AND HYDROCHLOROTHIAZIDE 20; 25 MG/1; MG/1
1 TABLET ORAL DAILY
Qty: 30 TABLET | Refills: 1 | Status: SHIPPED | OUTPATIENT
Start: 2021-07-16 | End: 2021-07-20

## 2021-07-16 NOTE — TELEPHONE ENCOUNTER
Called and left  for Pt requesting a return call to clinic to discuss inquiry.  Clinic telephone provided.    Requested prescriptions sent to Pt pharmacy.  No labs were previously requested by Dr Peace, but likely he will want an updated fasting lipid panel, but this could be completed after visit.    Selena Underwood LPN

## 2021-07-16 NOTE — TELEPHONE ENCOUNTER
Spoke to pt at 0920    Offered appt next week with Dr. Morelos and pt prefers to see Dr. Vernon again    Schedule with Dr. Vernon august 2nd at 3 PM--9th floor PWB    Pt aware of date/time/location    Reggie Linares RN 9:28 AM 07/23/21    ---      [phone]    Left message with direct number    Reggie Linares RN 12:14 PM 07/22/21    ----      [phone] home/cell  Left message with direct number at [phone]    Reggie Linares RN 9:40 AM 07/21/21    ---      Reviewed with rheumatology and pt seen by Dr. Paredes who recommended additional testing to check for plaquenil toxicity (clinic did not have all equipment at their location-- FAF needed per Rheumatology)    After review recommendation would be to schedule with Dr. Morelos or retina provider for second opinion/testing for plaquenil toxicity.    Call twice and left message with pt-- last time at 1125 today to review scheduling options for next week    Direct number provided     Reggie Linares RN 11:29 AM 07/16/21

## 2021-07-19 ENCOUNTER — VIRTUAL VISIT (OUTPATIENT)
Dept: CARDIOLOGY | Facility: CLINIC | Age: 56
End: 2021-07-19
Attending: INTERNAL MEDICINE
Payer: COMMERCIAL

## 2021-07-19 DIAGNOSIS — I25.10 ASCVD (ARTERIOSCLEROTIC CARDIOVASCULAR DISEASE): ICD-10-CM

## 2021-07-19 DIAGNOSIS — R07.89 CHEST DISCOMFORT: ICD-10-CM

## 2021-07-19 DIAGNOSIS — I10 HYPERTENSION GOAL BP (BLOOD PRESSURE) < 140/90: Primary | ICD-10-CM

## 2021-07-19 DIAGNOSIS — E11.9 TYPE 2 DIABETES, HBA1C GOAL < 8% (H): ICD-10-CM

## 2021-07-19 DIAGNOSIS — Z98.61 CAD S/P PERCUTANEOUS CORONARY ANGIOPLASTY: ICD-10-CM

## 2021-07-19 DIAGNOSIS — I10 HYPERTENSION, UNSPECIFIED TYPE: ICD-10-CM

## 2021-07-19 DIAGNOSIS — E78.5 HYPERLIPIDEMIA LDL GOAL <70: ICD-10-CM

## 2021-07-19 DIAGNOSIS — I10 ESSENTIAL HYPERTENSION: ICD-10-CM

## 2021-07-19 DIAGNOSIS — I25.10 CAD S/P PERCUTANEOUS CORONARY ANGIOPLASTY: ICD-10-CM

## 2021-07-19 DIAGNOSIS — I10 BENIGN ESSENTIAL HYPERTENSION: ICD-10-CM

## 2021-07-19 DIAGNOSIS — I25.10 CORONARY ARTERY DISEASE INVOLVING NATIVE HEART WITHOUT ANGINA PECTORIS, UNSPECIFIED VESSEL OR LESION TYPE: ICD-10-CM

## 2021-07-19 DIAGNOSIS — I25.5 ISCHEMIC CARDIOMYOPATHY: ICD-10-CM

## 2021-07-19 PROCEDURE — 99214 OFFICE O/P EST MOD 30 MIN: CPT | Mod: 95 | Performed by: INTERNAL MEDICINE

## 2021-07-19 NOTE — LETTER
7/19/2021      RE: Janine Cornell  [address] [facility] [address]       Dear Colleague,    Thank you for the opportunity to participate in the care of your patient, Janine Cornell, at the [facility]. Please see a copy of my visit note below.    Patient is eligible for a phone visit      PI:     I had the privilege to evaluate during a phone  Ms Janine Cornell, who is a 56 yr -American patient with DM2, Hypothyroidism, PCOS, Dyslipidemia, HTN, and CAD S/P NSTEMI s/p KATHERINE to the mLAD in 2011 and RCA in 2016      Patient has a positive RA factor and fibromyalgia and follows with Dr Rodriguez for the effect of plaquenil on her retina.     Since the COVID19 period started,patient has been more sedentary.  Patient reports that she has chest discomfort, but this is relatively stable.  She feels relatively tired - however her complaints have been relatively stable. Her BP has been higher as before resulting in less energy.  She denies palpitations, intermittent claudication. She has many chronic symptoms that appear to be stable at this time.       PAST MEDICAL HISTORY:  Past Medical History:   Diagnosis Date     Abnormal glandular Papanicolaou smear of cervix 1992     Allergic rhinitis     Allergy, airborne subst     Arthritis      ASCVD (arteriosclerotic cardiovascular disease)      Chronic pain      Chronic pancreatitis (H)     idiopathic, Tx: PPI, antioxidants, pancreatic enzymes     Common migraine      Coronary artery disease      Costochondritis      Difficulty in walking(719.7)      Dyspnea on exertion      Ectasia, mammary duct     [facility], persistent nipple discharge     Elevated fasting glucose      Gastro-oesophageal reflux disease      Granulomatosis with polyangiitis (H)      Hernia      History of angina      Hyperlipidemia LDL goal < 100      Hypertension goal BP (blood pressure) < 140/90      Essential hypertension     Iron deficiency anemia      Ischemic cardiomyopathy      Menorrhagia      Migraine headaches      Mild persistent asthma      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 Matias, neg w/u, Rx'd w/prednisone     Ventral hernia, unspecified, without mention of obstruction or gangrene 07/2012       CURRENT MEDICATIONS:  Current Outpatient Medications   Medication Sig Dispense Refill     aspirin (ASPIRIN LOW DOSE) 81 MG EC tablet Take 1 tablet (81 mg) by mouth daily . 30-day refills only. 30 tablet 11     clopidogrel (PLAVIX) 75 MG tablet Take 1 tablet (75 mg) by mouth daily . 30-day refills only. 30 tablet 11     lisinopril-hydrochlorothiazide (ZESTORETIC) 20-25 MG tablet Take 1 tablet by mouth daily . 30-day refills only. 30 tablet 11     metoprolol succinate ER (TOPROL-XL) 100 MG 24 hr tablet Take 1 tablet (100 mg) by mouth daily . 30-day refills only. 30 tablet 11     pravastatin (PRAVACHOL) 40 MG tablet Take 1 tablet (40 mg) by mouth daily . 30-day refills only. 30 tablet 11     spironolactone (ALDACTONE) 25 MG tablet Take 1 tablet (25 mg) by mouth daily . Take one additional 0.5 tab daily as needed for weight gain. 30-day refills only. 30 tablet 11     acetaminophen (TYLENOL) 325 MG tablet Take 1-2 tablets (325-650 mg) by mouth every 6 hours  as needed for pain (headache) 250 tablet 4     albuterol (2.5 MG/3ML) 0.083% neb solution INL 1 VIAL VIA NEBULIZATION Q 4 TO 6 HOURS PRN  1     albuterol (PROAIR HFA, PROVENTIL HFA, VENTOLIN HFA) 108 (90 BASE) MCG/ACT inhaler Inhale 2 puffs into the lungs every 6 hours as needed for shortness of breath / dyspnea or wheezing 3 Inhaler 1     blood glucose monitoring (NO BRAND SPECIFIED) meter device kit Use to test blood sugar 4 X times daily or as directed. (Patient taking differently: 1 kit Use to test blood sugar 4 X times daily or as directed.) 1 kit 0     blood glucose monitoring (NO BRAND SPECIFIED) test strip 1 strip by In Vitro route 4 times daily Test as directed. Please dispense three months and three months of refills. Thank you. (Patient taking differently: 1 strip by In Vitro route 4 times daily Test as directed. Please dispense three months and three months of refills. Thank you.) 360 each 3     Blood Pressure Monitor KIT 1 each daily Monitor home blood pressure as instructed by physician.  Dispense [place] blood pressure kit. 1 kit 0     calcium carbonate (TUMS) 500 MG chewable tablet Take 3-4 tablets (1,500-2,000 mg) by mouth daily as needed 90 tablet 3     COMPRESSION STOCKINGS 2 each daily Apply one 10-15 mmHg compression stocking to each lower extgmierty during the day and remove before bedtime. 4 each 2     cyclobenzaprine (FLEXERIL) 10 MG tablet Take 1 tablet (10 mg) by mouth 2 times daily as needed for muscle spasms 60 tablet 3     dicyclomine (BENTYL) 20 MG tablet TAKE 1 TABLET(20 MG) BY MOUTH FOUR TIMES DAILY AS NEEDED. Pls schedule clinic appt for refills. 60 tablet 0     diphenhydrAMINE (BENADRYL) 25 MG capsule Take 1 capsule (25 mg) by mouth nightly as needed for itching or allergies 30 capsule 2     EPIPEN 2-RIKY 0.3 MG/0.3ML injection INJECT 0.3 MG INTO THE MUSCLE PRF ANAPHYALAXIS  0     famotidine (PEPCID) 20 MG tablet Take 1 tablet (20 mg) by mouth 2 times daily as needed (abdominal pain)  20 tablet 0     fexofenadine (ALLEGRA) 180 MG tablet Take 1 tablet by mouth daily as needed. 90 tablet 3     fluocinolone (SYNALAR) 0.01 % solution Apply topically daily as needed       fluocinonide (LIDEX) 0.05 % external ointment Apply topically as needed       fluticasone-vilanterol (BREO ELLIPTA) 200-25 MCG/INH inhaler Inhale 1 puff into the lungs daily       folic acid (FOLVITE) 1 MG tablet Take 1 tablet by mouth daily 90 tablet 3     hydroxychloroquine (PLAQUENIL) 200 MG tablet Take 2 tablets (400 mg) by mouth daily (Annual eye exam/plaquenil screening) 180 tablet 0     insulin glargine (LANTUS PEN) 100 UNIT/ML pen Inject 58 Units Subcutaneous every morning       insulin pen needle (BD MARCK U/F) 32G X 4 MM USE DAILY OR AS DIRECTED 300 each 3     ketoconazole (NIZORAL) 2 % shampoo Apply topically daily as needed       Magnesium Oxide -Mg Supplement 250 MG tablet Take 1 tablet (250 mg) by mouth 2 times daily (Patient taking differently: Take 250 mg by mouth daily ) 60 tablet 3     metFORMIN (GLUCOPHAGE-XR) 500 MG 24 hr tablet Take 2,000 mg by mouth daily (with dinner)       montelukast (SINGULAIR) 10 MG tablet Take 1 tablet (10 mg) by mouth At Bedtime 30 tablet 1     Multiple Vitamin (DAILY-GERALD) TABS Take 1 tablet by mouth daily 90 tablet 0     nitroGLYcerin (NITROSTAT) 0.4 MG sublingual tablet Place 1 tablet (0.4 mg) under the tongue every 5 minutes as needed for chest pain . After 3 doses, if pain persists call 911. 25 tablet 3     ondansetron (ZOFRAN-ODT) 4 MG ODT tab Take 1 tablet (4 mg) by mouth every 8 hours as needed for nausea 12 tablet 0     polyethylene glycol (MIRALAX) 17 GM/Dose powder Take 17 g by mouth daily 225 g 0     sennosides (SENOKOT) 8.6 MG tablet 1-2 tabs a day as needed for constipation 60 tablet 1     traMADol (ULTRAM) 50 MG tablet Take 1 tablet (50 mg) by mouth every 8 hours as needed for moderate pain 60 tablet 3     vitamin D2 (ERGOCALCIFEROL) 33502 units (1250 mcg) capsule Take 1  capsule (50,000 Units) by mouth every 7 days 12 capsule 0     vitamin D3 (CHOLECALCIFEROL) 50 mcg (2000 units) tablet Take 1 tablet (50 mcg) by mouth daily 90 tablet 1       PAST SURGICAL HISTORY:  Past Surgical History:   Procedure Laterality Date     C ECHO HEART XTHORACIC,COMPLETE, W/O DOPPLER  04    [facility] WNL, EF 60%     C/SECTION, LOW TRANSVERSE           CARDIAC SURGERY      cardiac stent- recent in 16; 4 other stents     DILATION AND CURETTAGE N/A 2016    Procedure: DILATION AND CURETTAGE;  Surgeon: Nahed Butler MD;  Location: [facility] OR     HC UGI ENDOSCOPY W EUS  08    Dr. Pastrana, possible chronic pancreatitis, fatty liver     HERNIA REPAIR  2012    done at [facility]     INSERT INTRAUTERINE DEVICE N/A 2016    Procedure: INSERT INTRAUTERINE DEVICE;  Surgeon: Nahed Butler MD;  Location: [facility] OR     INT UTERINE FIBRIOD EMBOLIZATION  10/29/2014     LAPAROSCOPIC CHOLECYSTECTOMY  08    Dr. Arnol GRUBBS TX, CERVICAL      s/p LEEP     ORBITOTOMY Right 3/15/2016    Procedure: ORBITOTOMY;  Surgeon: Myron Cyr MD;  Location: [facility]     ORBITOTOMY Right 2017    Procedure: ORBITOTOMY;  RIGHT ORBITOTOMY AND BIOPSY;  Surgeon: Charis Holbrook MD;  Location: [facility]     REPAIR PTOSIS Right 2017    Procedure: REPAIR PTOSIS;  RIGHT UPPER LID PTOSIS REPAIR;  Surgeon: Myron Cyr MD;  Location: [facility]     UPPER GI ENDOSCOPY  10/21/08    mild gastritis, Dr. Hidalgo       ALLERGIES     Allergies   Allergen Reactions     Amoxicillin-Pot Clavulanate      Augmentin      Unknown possible hives and edema     Azithromycin      Diatrizoate Other (See Comments)     Pt wants this listed because she is allergic to shellfish      Imitrex [Sumatriptan]      Severe face/neck/chest tightness and flushing side effects      Penicillins Hives     Unknown      Pork Allergy      Stomach pain, cramping, diarrhea, itching, nausea and headaches     Shellfish Allergy  Hives and Swelling     Sulfa Drugs Hives and Swelling     Zithromax [Azithromycin Dihydrate] Swelling     Unknown        FAMILY HISTORY:  Family History   Problem Relation Age of Onset     Heart Disease Father 50        heart attack     Cerebrovascular Disease Father      Diabetes Father      Hypertension Father      Depression Father      C.A.D. Father      Hypertension Mother      Arthritis Mother      Heart Disease Mother         long qt syndrome     Thyroid Disease Mother      C.A.D. Mother      Heart Disease Brother 15        MI at 15, 16.      Diabetes Maternal Uncle      Hypertension Maternal Aunt      Hypertension Maternal Uncle      Arthritis Brother         he passed away, had severe arthritis at age 11     Arthritis Maternal Uncle      Eye Disorder Maternal Uncle         cataracts     Neurologic Disorder Sister         migraines     Neurologic Disorder Sister         migraines     Respiratory Son         asthma     Cerebrovascular Disease Maternal Uncle      C.A.D. Brother      Family History Negative Other         neg for RA, SLE     Unknown/Adopted No family hx of         unknown neurological issues in her family, mother was adopted     Skin Cancer No family hx of         No known family hx of skin cancer       SOCIAL HISTORY:  Social History     Socioeconomic History     Marital status: Single     Spouse name: Not on file     Number of children: 1     Years of education: Not on file     Highest education level: Not on file   Occupational History     Employer: NONE    Tobacco Use     Smoking status: Current Every Day Smoker     Packs/day: 0.20     Years: 1.00     Pack years: 0.20     Types: Cigarettes     Last attempt to quit: 2016     Years since quittin.5     Smokeless tobacco: Never Used   Substance and Sexual Activity     Alcohol use: No     Alcohol/week: 0.0 standard drinks     Drug use: No     Sexual activity: Not Currently   Other Topics Concern     Parent/sibling w/ CABG, MI or  angioplasty before 65F 55M? Yes   Social History Narrative    Balanced Diet - sometimes    Osteoporosis Prevention Measures - Dairy servings per day: 2 servings weekly    Regular Exercise -  Yes Describe walking 4 times a week    Dental Exam - NO    Seatbelts used - Yes    Self Breast Exam - Yes    Abuse: Current or Past (Physical, Sexual or Emotional)- No    Do you have any concerns about STD's -  No    Do you feel safe in your environment - No    Guns stored in the home - No    Sunscreen used - Yes    Lipids -  YES - Date: 053102    Glucose -  YES - Date: 012804    Eye Exam - YES - Date: one year ago    Colon Cancer Screening - No    Hemoccults - NO    Pap Test -  YES - Date: 070904, remote history of LEEP    Mammography - YES - Date: last spring, would like to discuss, needs [facility]    DEXA - NO    Immunizations reviewed and up to date - Yes, last td given in 1997 or 1998     Social Determinants of Health     Financial Resource Strain:      Difficulty of Paying Living Expenses:    Food Insecurity:      Worried About Running Out of Food in the Last Year:      Ran Out of Food in the Last Year:    Transportation Needs:      Lack of Transportation (Medical):      Lack of Transportation (Non-Medical):    Physical Activity:      Days of Exercise per Week:      Minutes of Exercise per Session:    Stress:      Feeling of Stress :    Social Connections:      Frequency of Communication with Friends and Family:      Frequency of Social Gatherings with Friends and Family:      Attends L[employer] Services:      Active Member of Clubs or Organizations:      Attends Club or Organization Meetings:      Marital Status:    Intimate Partner Violence:      Fear of Current or Ex-Partner:      Emotionally Abused:      Physically Abused:      Sexually Abused:        ROS:   Constitutional: No fever, chills, or sweats. No weight gain/loss   ENT: No visual disturbance, ear ache, epistaxis, sore  throat  Allergies/Immunologic: Negative.   Respiratory: No cough, hemoptysia  Cardiovascular: As per HPI  GI: No nausea, vomiting, hematemesis, melena, or hematochezia  : No urinary frequency, dysuria, or hematuria  Integument: Negative  Psychiatric: Negative  Neuro: Negative  Endocrinology: Negative   Musculoskeletal: Negative    EXAM:  There were no vitals taken for this visit.  Virtual visit  Labs:  LIPID RESULTS:  Lab Results   Component Value Date    CHOL 102 06/18/2020    HDL 30 (L) 06/18/2020    LDL 50 06/18/2020    TRIG 104 06/18/2020    CHOLHDLRATIO 3.5 07/29/2015    NHDL 71 06/18/2020       LIVER ENZYME RESULTS:  Lab Results   Component Value Date    AST 20 05/28/2021    ALT 28 05/28/2021       CBC RESULTS:  Lab Results   Component Value Date    WBC 9.0 05/28/2021    RBC 4.74 05/28/2021    HGB 12.5 05/28/2021    HCT 36.7 05/28/2021    MCV 77 (L) 05/28/2021    MCH 26.4 (L) 05/28/2021    MCHC 34.1 05/28/2021    RDW 12.8 05/28/2021     05/28/2021       BMP RESULTS:  Lab Results   Component Value Date     05/28/2021    POTASSIUM 3.5 05/28/2021    CHLORIDE 106 05/28/2021    CO2 27 05/28/2021    ANIONGAP 5 05/28/2021     (H) 05/28/2021    BUN 12 05/28/2021    CR 1.00 05/28/2021    GFRESTIMATED 64 05/28/2021    GFRESTBLACK 74 05/28/2021    KATHY 9.4 05/28/2021        A1C RESULTS:  Lab Results   Component Value Date    A1C 8.0 (H) 06/18/2020       INR RESULTS:  Lab Results   Component Value Date    INR 0.97 08/25/2016    INR 0.98 05/20/2015         Assessment and Plan:     We discussed the results with patient.  We discussed the importance of a heart healthy lifestyle and diet, especially diabetes diet.  We continue with same medical regimen.  Follow-up within 1 year.      30 min were spent directly with patient during phone visit.    Milan Peace MD, PhD  Professor of Medicine  Division of Cardiology        CC  Patient Care Team:  Kash Solano MD as PCP - General (Family  Practice)  Milan Peace MD as MD (Cardiology)  Majo Mckinnon MD as MD (Rheumatology)  Jas Vernon MD as MD (Ophthalmology)  Jas Vernon MD as Referring Physician (Ophthalmology)  Charis Holbrook MD as Resident (Ophthalmology)  Sangeetha Vasquez MD as MD (Otolaryngology)  Selena Underwood LPN as Nurse Coordinator (Cardiology)  Majo Mckinnon MD as Assigned Rheumatology Provider  Kash Solano MD as Assigned PCP  DARLING THOMPSON

## 2021-07-20 RX ORDER — LISINOPRIL AND HYDROCHLOROTHIAZIDE 20; 25 MG/1; MG/1
1 TABLET ORAL DAILY
Qty: 30 TABLET | Refills: 11 | Status: SHIPPED | OUTPATIENT
Start: 2021-07-20 | End: 2022-07-07

## 2021-07-20 RX ORDER — PRAVASTATIN SODIUM 40 MG
40 TABLET ORAL DAILY
Qty: 30 TABLET | Refills: 11 | Status: SHIPPED | OUTPATIENT
Start: 2021-07-20 | End: 2022-07-07

## 2021-07-20 RX ORDER — METOPROLOL SUCCINATE 100 MG/1
100 TABLET, EXTENDED RELEASE ORAL DAILY
Qty: 30 TABLET | Refills: 11 | Status: SHIPPED | OUTPATIENT
Start: 2021-07-20 | End: 2022-07-07

## 2021-07-20 RX ORDER — CLOPIDOGREL BISULFATE 75 MG/1
75 TABLET ORAL DAILY
Qty: 30 TABLET | Refills: 11 | Status: SHIPPED | OUTPATIENT
Start: 2021-07-20 | End: 2022-07-07

## 2021-07-20 RX ORDER — SPIRONOLACTONE 25 MG/1
25 TABLET ORAL DAILY
Qty: 30 TABLET | Refills: 11 | Status: SHIPPED | OUTPATIENT
Start: 2021-07-20 | End: 2021-11-04

## 2021-07-20 NOTE — PATIENT INSTRUCTIONS
Patient Instructions:  It was a pleasure to see you in the cardiology clinic today.      If you have any questions, you can reach my nurse, Selena CORBETT LPN, at [phone].  Press Option #1 for the [facility], and then press Option #4 for nursing.    We are encouraging the use of Virtual [employer] to communicate with your HealthCare Provider    Medication Changes: None.    Recommendations: Fasting labs in one year prior to seeing Dr Peace.    Studies Ordered: None.    The results from today include: None.    Please follow up: With Dr. Peace in one year.    Sincerely,    Milan Peace MD     If you have an urgent need after hours (8:00 am to 4:30 pm) please call [phone] and ask for the cardiology fellow on call.

## 2021-07-21 ENCOUNTER — TELEPHONE (OUTPATIENT)
Dept: EDUCATION SERVICES | Facility: CLINIC | Age: 56
End: 2021-07-21

## 2021-07-21 NOTE — TELEPHONE ENCOUNTER
Returned Janine's call. She wanted to make an appt with someone if they still worked here, but I couldn't tell what name she said. I told her if she wanted to make an appt with diabetes education she would first need a referral from her PCP. I do not see that se has ever met with us though.    Selena Gavin RN, [facility]

## 2021-07-22 DIAGNOSIS — E55.9 VITAMIN D DEFICIENCY: Primary | ICD-10-CM

## 2021-07-26 RX ORDER — ERGOCALCIFEROL 1.25 MG/1
50000 CAPSULE, LIQUID FILLED ORAL
Qty: 12 CAPSULE | Refills: 0 | OUTPATIENT
Start: 2021-07-26

## 2021-07-26 RX ORDER — CHOLECALCIFEROL (VITAMIN D3) 50 MCG
1 TABLET ORAL DAILY
Qty: 90 TABLET | Refills: 1 | Status: SHIPPED | OUTPATIENT
Start: 2021-07-26 | End: 2022-08-19

## 2021-07-26 NOTE — TELEPHONE ENCOUNTER
MD Majo  You 24 minutes ago (12:01 PM)   PF  Plz refill as 2000 units every day, #90 with1 refill.

## 2021-07-26 NOTE — TELEPHONE ENCOUNTER
vitamin D2 (ERGOCALCIFEROL) 94757 units (1250 mcg) capsule      Last Written Prescription Date:  5/10/2021  Last Fill Quantity: 12 cap,   # refills: 0  Last Office Visit : 5/10/2021  Future Office visit:  8/20/2021    Routing refill request to provider for review/approval because:  Dose exceeds medication protocol parameters.

## 2021-07-28 ENCOUNTER — TELEPHONE (OUTPATIENT)
Dept: OPHTHALMOLOGY | Facility: CLINIC | Age: 56
End: 2021-07-28

## 2021-07-28 NOTE — TELEPHONE ENCOUNTER
Spoke to patient as upcoming appointment was incorrectly scheduled by Reggie and needed to be rescheduled.  We could see patient same day at 2:30 instead of 3:00 pm. Patient stated that was not going to work for her.  Scheduled next available and added to wait list.      Angela Pretty on 7/28/2021 at 3:00 PM

## 2021-08-08 NOTE — PROGRESS NOTES
Patient is eligible for a phone visit      PI:     I had the privilege to evaluate during a phone  Ms Janine Cornell, who is a 56 yr -American patient with DM2, Hypothyroidism, PCOS, Dyslipidemia, HTN, and CAD S/P NSTEMI s/p KATHERINE to the mLAD in 2011 and RCA in 2016      Patient has a positive RA factor and fibromyalgia and follows with Dr Rodrgiuez for the effect of plaquenil on her retina.     Since the COVID19 period started,patient has been more sedentary.  Patient reports that she has chest discomfort, but this is relatively stable.  She feels relatively tired - however her complaints have been relatively stable. Her BP has been higher as before resulting in less energy.  She denies palpitations, intermittent claudication. She has many chronic symptoms that appear to be stable at this time.       PAST MEDICAL HISTORY:  Past Medical History:   Diagnosis Date     Abnormal glandular Papanicolaou smear of cervix 1992     Allergic rhinitis     Allergy, airborne subst     Arthritis      ASCVD (arteriosclerotic cardiovascular disease)      Chronic pain      Chronic pancreatitis (H)     idiopathic, Tx: PPI, antioxidants, pancreatic enzymes     Common migraine      Coronary artery disease      Costochondritis      Difficulty in walking(719.7)      Dyspnea on exertion      Ectasia, mammary duct     [facility], persistent nipple discharge     Elevated fasting glucose      Gastro-oesophageal reflux disease      Granulomatosis with polyangiitis (H)      Hernia      History of angina      Hyperlipidemia LDL goal < 100      Hypertension goal BP (blood pressure) < 140/90     Essential hypertension     Iron deficiency anemia      Ischemic cardiomyopathy      Menorrhagia      Migraine headaches      Mild persistent asthma      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 Matias, neg w/u, Rx'd w/prednisone     Ventral hernia, unspecified, without mention of obstruction or gangrene 07/2012       CURRENT MEDICATIONS:  Current Outpatient Medications   Medication Sig Dispense Refill     aspirin (ASPIRIN LOW DOSE) 81 MG EC tablet Take 1 tablet (81 mg) by mouth daily . 30-day refills only. 30 tablet 11     clopidogrel (PLAVIX) 75 MG tablet Take 1 tablet (75 mg) by mouth daily . 30-day refills only. 30 tablet 11     lisinopril-hydrochlorothiazide (ZESTORETIC) 20-25 MG tablet Take 1 tablet by mouth daily . 30-day refills only. 30 tablet 11     metoprolol succinate ER (TOPROL-XL) 100 MG 24 hr tablet Take 1 tablet (100 mg) by mouth daily . 30-day refills only. 30 tablet 11     pravastatin (PRAVACHOL) 40 MG tablet Take 1 tablet (40 mg) by mouth daily . 30-day refills only. 30 tablet 11     spironolactone (ALDACTONE) 25 MG tablet Take 1 tablet (25 mg) by mouth daily . Take one additional 0.5 tab daily as needed for weight gain. 30-day refills only. 30 tablet 11     acetaminophen (TYLENOL) 325 MG tablet Take 1-2 tablets (325-650 mg) by mouth every 6 hours as needed for pain (headache) 250 tablet 4     albuterol (2.5 MG/3ML) 0.083% neb solution INL 1 VIAL VIA NEBULIZATION Q 4 TO 6 HOURS PRN  1     albuterol (PROAIR HFA, PROVENTIL HFA, VENTOLIN HFA) 108 (90 BASE) MCG/ACT inhaler Inhale 2 puffs into the lungs every 6 hours as needed for shortness of breath / dyspnea or wheezing 3 Inhaler 1      blood glucose monitoring (NO BRAND SPECIFIED) meter device kit Use to test blood sugar 4 X times daily or as directed. (Patient taking differently: 1 kit Use to test blood sugar 4 X times daily or as directed.) 1 kit 0     blood glucose monitoring (NO BRAND SPECIFIED) test strip 1 strip by In Vitro route 4 times daily Test as directed. Please dispense three months and three months of refills. Thank you. (Patient taking differently: 1 strip by In Vitro route 4 times daily Test as directed. Please dispense three months and three months of refills. Thank you.) 360 each 3     Blood Pressure Monitor KIT 1 each daily Monitor home blood pressure as instructed by physician.  Dispense [place] blood pressure kit. 1 kit 0     calcium carbonate (TUMS) 500 MG chewable tablet Take 3-4 tablets (1,500-2,000 mg) by mouth daily as needed 90 tablet 3     COMPRESSION STOCKINGS 2 each daily Apply one 10-15 mmHg compression stocking to each lower extgmierty during the day and remove before bedtime. 4 each 2     cyclobenzaprine (FLEXERIL) 10 MG tablet Take 1 tablet (10 mg) by mouth 2 times daily as needed for muscle spasms 60 tablet 3     dicyclomine (BENTYL) 20 MG tablet TAKE 1 TABLET(20 MG) BY MOUTH FOUR TIMES DAILY AS NEEDED. Pls schedule clinic appt for refills. 60 tablet 0     diphenhydrAMINE (BENADRYL) 25 MG capsule Take 1 capsule (25 mg) by mouth nightly as needed for itching or allergies 30 capsule 2     EPIPEN 2-RIKY 0.3 MG/0.3ML injection INJECT 0.3 MG INTO THE MUSCLE PRF ANAPHYALAXIS  0     famotidine (PEPCID) 20 MG tablet Take 1 tablet (20 mg) by mouth 2 times daily as needed (abdominal pain) 20 tablet 0     fexofenadine (ALLEGRA) 180 MG tablet Take 1 tablet by mouth daily as needed. 90 tablet 3     fluocinolone (SYNALAR) 0.01 % solution Apply topically daily as needed       fluocinonide (LIDEX) 0.05 % external ointment Apply topically as needed       fluticasone-vilanterol (BREO ELLIPTA) 200-25 MCG/INH inhaler Inhale 1 puff  into the lungs daily       folic acid (FOLVITE) 1 MG tablet Take 1 tablet by mouth daily 90 tablet 3     hydroxychloroquine (PLAQUENIL) 200 MG tablet Take 2 tablets (400 mg) by mouth daily (Annual eye exam/plaquenil screening) 180 tablet 0     insulin glargine (LANTUS PEN) 100 UNIT/ML pen Inject 58 Units Subcutaneous every morning       insulin pen needle (BD MARCK U/F) 32G X 4 MM USE DAILY OR AS DIRECTED 300 each 3     ketoconazole (NIZORAL) 2 % shampoo Apply topically daily as needed       Magnesium Oxide -Mg Supplement 250 MG tablet Take 1 tablet (250 mg) by mouth 2 times daily (Patient taking differently: Take 250 mg by mouth daily ) 60 tablet 3     metFORMIN (GLUCOPHAGE-XR) 500 MG 24 hr tablet Take 2,000 mg by mouth daily (with dinner)       montelukast (SINGULAIR) 10 MG tablet Take 1 tablet (10 mg) by mouth At Bedtime 30 tablet 1     Multiple Vitamin (DAILY-GERALD) TABS Take 1 tablet by mouth daily 90 tablet 0     nitroGLYcerin (NITROSTAT) 0.4 MG sublingual tablet Place 1 tablet (0.4 mg) under the tongue every 5 minutes as needed for chest pain . After 3 doses, if pain persists call 911. 25 tablet 3     ondansetron (ZOFRAN-ODT) 4 MG ODT tab Take 1 tablet (4 mg) by mouth every 8 hours as needed for nausea 12 tablet 0     polyethylene glycol (MIRALAX) 17 GM/Dose powder Take 17 g by mouth daily 225 g 0     sennosides (SENOKOT) 8.6 MG tablet 1-2 tabs a day as needed for constipation 60 tablet 1     traMADol (ULTRAM) 50 MG tablet Take 1 tablet (50 mg) by mouth every 8 hours as needed for moderate pain 60 tablet 3     vitamin D2 (ERGOCALCIFEROL) 34009 units (1250 mcg) capsule Take 1 capsule (50,000 Units) by mouth every 7 days 12 capsule 0     vitamin D3 (CHOLECALCIFEROL) 50 mcg (2000 units) tablet Take 1 tablet (50 mcg) by mouth daily 90 tablet 1       PAST SURGICAL HISTORY:  Past Surgical History:   Procedure Laterality Date     C ECHO HEART XTHORACIC,COMPLETE, W/O DOPPLER  2/4/04    [facility] WNL, EF 60%      C/SECTION, LOW TRANSVERSE           CARDIAC SURGERY      cardiac stent- recent in 16; 4 other stents     DILATION AND CURETTAGE N/A 2016    Procedure: DILATION AND CURETTAGE;  Surgeon: Nahed Butler MD;  Location: [facility]     HC UGI ENDOSCOPY W EUS  08    Dr. Pastrana, possible chronic pancreatitis, fatty liver     HERNIA [place]PA[place]  2012    [place] [facility]     INSERT INTRAUTERINE DEVICE N/A 2016    Procedure: INSERT INTRAUTERINE DEVICE;  Surgeon: Nahed Butler MD;  Location: [facility] OR     INT UTERINE FIBRIOD EMBOLIZATION  10/29/2014     LAPAROSCOPIC CHOLECYSTECTOMY  08    Dr. Arnol GRUBBS TX, CERVICAL      s/p LEEP     ORBITOTOMY Right 3/15/2016    Procedure: ORBITOTOMY;  Surgeon: Myron Cyr MD;  Location: [facility]     ORBITOTOMY Right 2017    Procedure: ORBITOTOMY;  RIGHT ORBITOTOMY AND BIOPSY;  Surgeon: Charis Holbrook MD;  Location: [facility]     REPAIR PTOSIS Right 2017    Procedure: REPAIR PTOSIS;  RIGHT UPPER LID PTOSIS REPAIR;  Surgeon: Myron Cyr MD;  Location: [facility]     UPPER GI ENDOSCOPY  10/21/08    mild gastritis, Dr. Hidalgo       ALLERGIES     Allergies   Allergen Reactions     Amoxicillin-Pot Clavulanate      Augmentin      Unknown possible hives and edema     Azithromycin      Diatrizoate Other (See Comments)     Pt wants this listed because she is allergic to shellfish      Imitrex [Sumatriptan]      Severe face/neck/chest tightness and flushing side effects      Penicillins Hives     Unknown      Pork Allergy      Stomach pain, cramping, diarrhea, itching, nausea and headaches     Shellfish Allergy Hives and Swelling     Sulfa Drugs Hives and Swelling     Zithromax [Azithromycin Dihydrate] Swelling     Unknown        FAMILY HISTORY:  Family History   Problem Relation Age of Onset     Heart Disease Father 50        heart attack     Cerebrovascular Disease Father      Diabetes Father      Hypertension Father      Depression Father       ALCON.A.CHELLE. Father      Hypertension Mother      Arthritis Mother      Heart Disease Mother         long qt syndrome     Thyroid Disease Mother      C.A.D. Mother      Heart Disease Brother 15        MI at 15, 16.      Diabetes Maternal Uncle      Hypertension Maternal Aunt      Hypertension Maternal Uncle      Arthritis Brother         he passed away, had severe arthritis at age 11     Arthritis Maternal Uncle      Eye Disorder Maternal Uncle         cataracts     Neurologic Disorder Sister         migraines     Neurologic Disorder Sister         migraines     Respiratory Son         asthma     Cerebrovascular Disease Maternal Uncle      C.A.D. Brother      Family History Negative Other         neg for RA, SLE     Unknown/Adopted No family hx of         unknown neurological issues in her family, mother was adopted     Skin Cancer No family hx of         No known family hx of skin cancer       SOCIAL HISTORY:  Social History     Socioeconomic History     Marital status: Single     Spouse name: Not on file     Number of children: 1     Years of education: Not on file     Highest education level: Not on file   Occupational History     Employer: NONE    Tobacco Use     Smoking status: Current Every Day Smoker     Packs/day: 0.20     Years: 1.00     Pack years: 0.20     Types: Cigarettes     Last attempt to quit: 2016     Years since quittin.5     Smokeless tobacco: Never Used   Substance and Sexual Activity     Alcohol use: No     Alcohol/week: 0.0 standard drinks     Drug use: No     Sexual activity: Not Currently   Other Topics Concern     Parent/sibling w/ CABG, MI or angioplasty before 65F 55M? Yes   Social History Narrative    Balanced Diet - sometimes    Osteoporosis Prevention Measures - Dairy servings per day: 2 servings weekly    Regular Exercise -  Yes Describe walking 4 times a week    Dental Exam - NO    Seatbelts used - Yes    Self Breast Exam - Yes    Abuse: Current or Past (Physical, Sexual or  Emotional)- No    Do you have any concerns about STD's -  No    Do you feel safe in your environment - No    Guns stored in the home - No    Sunscreen used - Yes    Lipids -  YES - Date: 053102    Glucose -  YES - Date: 012804    Eye Exam - YES - Date: one year ago    Colon Cancer Screening - No    Hemoccults - NO    Pap Test -  YES - Date: 070904, remote history of LEEP    Mammography - YES - Date: last spring, would like to discuss, needs a referral to [facility]    DEXA - NO    Immunizations reviewed and up to date - Yes, last td given in 1997 or 1998     Social Determinants of Health     Financial Resource Strain:      Difficulty of Paying Living Expenses:    Food Insecurity:      Worried About Running Out of Food in the Last Year:      Ran Out of Food in the Last Year:    Transportation Needs:      Lack of Transportation (Medical):      Lack of Transportation (Non-Medical):    Physical Activity:      Days of Exercise per Week:      Minutes of Exercise per Session:    Stress:      Feeling of Stress :    Social Connections:      Frequency of Communication with Friends and Family:      Frequency of Social Gatherings with Friends and Family:      Attends [employer] Services:      Active Member of Clubs or Organizations:      Attends Club or Organization Meetings:      Marital Status:    Intimate Partner Violence:      Fear of Current or Ex-Partner:      Emotionally Abused:      Physically Abused:      Sexually Abused:        ROS:   Constitutional: No fever, chills, or sweats. No weight gain/loss   ENT: No visual disturbance, ear ache, epistaxis, sore throat  Allergies/Immunologic: Negative.   Respiratory: No cough, hemoptysia  Cardiovascular: As per HPI  GI: No nausea, vomiting, hematemesis, melena, or hematochezia  : No urinary frequency, dysuria, or hematuria  Integument: Negative  Psychiatric: Negative  Neuro: Negative  Endocrinology: Negative   Musculoskeletal: Negative    EXAM:  There were no  vitals taken for this visit.  Virtual visit  Labs:  LIPID RESULTS:  Lab Results   Component Value Date    CHOL 102 06/18/2020    HDL 30 (L) 06/18/2020    LDL 50 06/18/2020    TRIG 104 06/18/2020    CHOLHDLRATIO 3.5 07/29/2015    NHDL 71 06/18/2020       LIVER ENZYME RESULTS:  Lab Results   Component Value Date    AST 20 05/28/2021    ALT 28 05/28/2021       CBC RESULTS:  Lab Results   Component Value Date    WBC 9.0 05/28/2021    RBC 4.74 05/28/2021    HGB 12.5 05/28/2021    HCT 36.7 05/28/2021    MCV 77 (L) 05/28/2021    MCH 26.4 (L) 05/28/2021    MCHC 34.1 05/28/2021    RDW 12.8 05/28/2021     05/28/2021       BMP RESULTS:  Lab Results   Component Value Date     05/28/2021    POTASSIUM 3.5 05/28/2021    CHLORIDE 106 05/28/2021    CO2 27 05/28/2021    ANIONGAP 5 05/28/2021     (H) 05/28/2021    BUN 12 05/28/2021    CR 1.00 05/28/2021    GFRESTIMATED 64 05/28/2021    GFRESTBLACK 74 05/28/2021    KATHY 9.4 05/28/2021        A1C RESULTS:  Lab Results   Component Value Date    A1C 8.0 (H) 06/18/2020       INR RESULTS:  Lab Results   Component Value Date    INR 0.97 08/25/2016    INR 0.98 05/20/2015         Assessment and Plan:     We discussed the results with patient.  We discussed the importance of a heart healthy lifestyle and diet, especially diabetes diet.  We continue with same medical regimen.  Follow-up within 1 year.      30 min were spent directly with patient during phone visit.    Milan Peace MD, PhD  Professor of Medicine  Division of Cardiology        CC  Patient Care Team:  Kash Solano MD as PCP - General (Family Practice)  Milan Peace MD as MD (Cardiology)  Majo Mckinnon MD as MD (Rheumatology)  Jas Vernon MD as MD (Ophthalmology)  Jas Vernon MD as Referring Physician (Ophthalmology)  Charis Holbrook MD as Resident (Ophthalmology)  Sangeetha Vasquez MD as MD (Otolaryngology)  Selena Underwood LPN as Nurse Coordinator (Cardiology)  Kobi,  Milan ARCINIEGA MD as Assigned Heart and Vascular Provider  Majo Mckinnon MD as Assigned Rheumatology Provider  Kash Solano MD as Assigned PCP  DARLING THOMPSON

## 2021-08-15 NOTE — PROGRESS NOTES
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 Cornell is a 51 yo AAF who was referred to our clinic for evaluation and management of her joint pain in setting of positive RF 26.    Her joint symptoms first started more than a year ago, but over last 6-8 months fluctuating some good and bad days . All her joints are involved. Over last 5 months, hands became swollen, warm and painful. Tylenol does not help. Ketoprofen did not help. Was told to avoid NSAIDs given ACS. Reports AM/PM stiffness x 2-3 hr, can't make full fist when wakes up in AM.    She feels tired all the time. Reports worsening hair loss and unchanged  facial rash. Gets red sore flaky rash over cheeks across her face which is intermittent diagnosed Rosacea and is prescribed metronidazole gel which she has not started using. Reports occasional oral ulcers. Hands feel cold with red discoloration last winter. Has occasional dysphagia to both solids and liquid. Has dry eyes, is using OTC allergy eyedrops. Has chronic abdominal pain. Has h/o pancreatitis. Sometime has anxiety. Occasionally has numbness, tingling in fingers/toes. Has back and spine issues, her whole back and neck hurts, different areas at different time. She is wondering if she has FMS. Has hard time sleeping, has never been diagnosed with BRENNA. She does not know if he snores. She was found to have slightly positive RF in 2/2013. Has microcytic anemia.     Her arthralgia gets worse with drop in temperature. Reports body ache. Activity makes her pain worse. Her joint swelling isintermittent. Her body pain and joint pain is the same. Reports AM stiffness x 3 hr.    She has been doing acupuncture x few months and it is helping with her pain. She thinks that the combination of plaquenil and acupuncture is helpful but overall she notice  30% in her symptoms relief.     Takes tylenol and tramadol on occasion for pain.    She decided to use different formulation of metformin which helped with her abdominal pain. I referred her to GI for evaluation of elevated lipase and abdominal pain. She decided to see GI in the future.     She is on  mg qd since 5/2014, tolerates it well and it helps her some. Denies taking any gabapentin due to chest pain and headches. She has had referral her for water aerobics, but she can't begin until she's healed from recent surgery in 10/2014. She thinks HCQ is helping but not enough to control all her pain, reports 2-3 hr of AM stiffness with ongoing diffuse arthralgia/myalgia along with intermittent swelling of hands. She does see her acupuncture person and it helps with her pain, has not started water aerobics yet. Has got her flu shot.     10/2015: Reports having pleuritic CP in 3/2015, was prescribed Lidoderm patches first. It did not help. Took prednisone (?dose) by her own, pain got better but it recurred off prednisone and gradually got worse to the point that she could not stand the pain anymore, was seen in ER in 5/2015, was treated with medrol dose pack which helped. Reports pain over hips, knees, ankles and fingers. Pain gets worse with walking. Reports myalgia, swelling of the arms. Pain over neck, shoulders. Has AM stiffness x 3-4 hr. Fingers swell up and become painful. Can't remember if steroid helped with joint pain. She had headaches, severe CP when she took tramadol and gabapentin and stopped taking them, sx resolved but she can't tell which one caused SEs but thinks that probably it was gabapentin. She has good days and tries to be more active but activity aggravates the pain. Headaches are intermittent. HCQ helps some not enough to control all the pain. No HCQ SEs. Had eye exam around July 2015. Flexeril helps but PCP wants to change flexeril to zanaflex, reporting that she is NOT comfortable with  the change. Acupuncture still helps an she continued to  do that. Concerned about fat pads she has in supraclavicular area, has h/o thyroid nodules. Continues having hair loss, takes iron for iron deficiency.     2/2016: She has 2 major complaints today, increased joint pain/swelling in hands/feet and recent Dx of optic neuritis in R eye, reports having similar problem about 20 yr ago, now recurred. Has a spot in R eye vision x long term, was seen by ophthalmology few days ago and was told to have optic neuritis. Gets joint pain, muscle pain. Can't  objects, hands are swollen. Knees, hips and ankles are painful. Reports more arthralgia. AM stiffness/ pain is 3-4 hr. She wants me to talk to her ophthalmologist directly.    She had botox inj for migraines which did not help her. She is going to have angiogram next wk, had to take more nitro for CP recently.     4/29/2016: She reports being on steroid taper x 3 since last visit. Reportedly, had orbit MRI, it showed swelling/inflamamtion of R lacrimal glands. She had painful swelling of her R eye, cause her headaches. Botox inj made her headaches worse. She is being dealing with this since 1/2016, with severe headaches and pain/swelling around R eye. Had biopsy in 3/2016. She is frustrated as prednisone causes her weight gain and her BG is difficult to control on it.    5/2016: At last visit, was prescribed MTX 10 mg po qwk to take along with FA 1 mg qd. She decided not to take MTX, is afraid of side effects, believes all these medications would harm her. She also believes that botox caused her inflammation around R eye. No major flare up of per-orbital inflamamtion since last visit but continues to have swelling around her R eye.    11/2016: Reports diffuse body ache, arthralgia, myalgia especially with weather change. No major flare up of campos-orbital inflammation but her face/around R eye swells up from time to time. Reports 2 episodes of CP over past 2 mo, nitro  sometimes helps and sometimes does not help. She has asthma and costochondritis and she has hard time to distinguish the origin of pain. Sometimes knees and fingers swell up. Her stiffness is sometimes all day.    4/2017:  Reports having sinusitis since last visit. Her R eye is dry and is using eyedrops to keep it moist. Reports having pain in ears. Was treated with antibiotics by PCP, it got better then it got worse. Reports catching infections easily. Then started having pressure over eyes with pain/headaches (exact start date is unknown). Pain gradually got worse and became persistent. Had sinus CT.     4/7/2017: Having a flare up of swelling, pain around her R orbit. Has not tried MTX yet.    5/2017: On MTX 10 mg po qwk since 4/7/2017, reports increased stomach pain and nausea and new headaches since start of MTX and it's not helping. Pain/swelling around R orbit is worse.    6/2017: She finished prednisone taper prescribed at last visit, R campos-orbital swelling significantly improved. 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    2/2018: R campos-orbital swelling has improved. Repeat R lacrimal gland biopsy in 8/2017 showed non specific inflammation with no lymphoma. She is off steroid. Did not tolerate AZA due to N/V and abdominal pain.    Is back with recurrence of R campos-orbital sweling x past 2 months. Pain really got worse over past 3wk, requries     9/2018: Declined ritiximab at last visit, lost f/u since 2/2018. Went to [place] and was seen on 8/29 by Dr. 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 Reportedly he ordered labs and recommended surgery since the inflammation of the R eye is back and is pushing the eye down. Janine took some prednsione 30 mg every day few days a go for pain.    3/2019: She had lateral orbitotomy and  debulking orbital mass right eye on 9/26/18 with Dr. Shah and Dr. Valdez at [facility]. 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 There were no complications according to the op note.    Janine finally agreed to receive rituximab for her GPA. She had it as 1 gr q2wk x 2 on 12/12/2018 and 12/26/2018. Had minor allergic reaction which was managed by IV solu cortef and benadryl. She is off prednisone about 6wk after surgery. Had bleeding ulcer from prednsione. Has pain over sinus, ears. Still has residual pain, swelling around her R eye is concerned about it. Wants to transfer her care to ophthalmology here as it's closer to her.    Cold induced sweating congestion joint pain  Facial swelling  allegy doctor clear ear pft ok doxy sinusitis    Today (7/12/19):  Patient completed her 2 rounds of rituximab in June. She tolerated well. She was recently seen by Dr. Vernon in Optho and repeat CT scan of orbits shows some decrease in proptosis. She reports that her R eye still intermittently feels puffy. She also mentions intermittent swelling in her arms. Unclear of any triggers. Limbs are also heavy with muscle aches and some joint pains. Exacerbated with activity.      Component      Latest Ref Rng 2/28/2013 2/28/2013          12:14 PM 12:14 PM   WBC      4.0 - 11.0 10e9/L     RBC Count      3.8 - 5.2 10e12/L     Hemoglobin      11.7 - 15.7 g/dL     Hematocrit      35.0 - 47.0 %     MCV      78 - 100 fl     MCH      26.5 - 33.0 pg     MCHC      31.5 - 36.5 g/dL     RDW      10.0 - 15.0 %     Platelet Count      150 - 450 10e9/L     Specimen Description           Lyme Screen IgG and IgM           Vitamin D Deficiency screening      30 - 75 ug/L     Ferritin      10 - 300 ng/mL     Sed Rate      0 - 20 mm/h     ALLI Screen by EIA      <1.0     Rheumatoid Factor      0 - 14 IU/mL     CK Total      30 - 225 U/L  78   Uric Acid      2.5 - 7.5 mg/dL 6.7      Component      Latest Ref Rng 2/28/2013          12:14 PM    WBC      4.0 - 11.0 10e9/L    RBC Count      3.8 - 5.2 10e12/L    Hemoglobin      11.7 - 15.7 g/dL    Hematocrit      35.0 - 47.0 %    MCV      78 - 100 fl    MCH      26.5 - 33.0 pg    MCHC      31.5 - 36.5 g/dL    RDW      10.0 - 15.0 %    Platelet Count      150 - 450 10e9/L    Specimen Description       Serum   Lyme Screen IgG and IgM       Test value: <0.75....Interpretation: Negative....If you highly suspect Lyme . . .   Vitamin D Deficiency screening      30 - 75 ug/L    Ferritin      10 - 300 ng/mL    Sed Rate      0 - 20 mm/h    ALLI Screen by EIA      <1.0    Rheumatoid Factor      0 - 14 IU/mL    CK Total      30 - 225 U/L    Uric Acid      2.5 - 7.5 mg/dL      Component      Latest Ref Rng 2/28/2013 2/28/2013 2/28/2013 2/28/2013          12:14 PM 12:14 PM 12:14 PM 12:14 PM   WBC      4.0 - 11.0 10e9/L       RBC Count      3.8 - 5.2 10e12/L       Hemoglobin      11.7 - 15.7 g/dL       Hematocrit      35.0 - 47.0 %       MCV      78 - 100 fl       MCH      26.5 - 33.0 pg       MCHC      31.5 - 36.5 g/dL       RDW      10.0 - 15.0 %       Platelet Count      150 - 450 10e9/L       Specimen Description             Lyme Screen IgG and IgM             Vitamin D Deficiency screening      30 - 75 ug/L       Ferritin      10 - 300 ng/mL    10   Sed Rate      0 - 20 mm/h   23 (H)    ALLI Screen by EIA      <1.0  <1.0 . . .     Rheumatoid Factor      0 - 14 IU/mL 26 (H)      CK Total      30 - 225 U/L       Uric Acid      2.5 - 7.5 mg/dL         Component      Latest Ref Rng 2/28/2013 2/28/2013          12:14 PM 12:14 PM   WBC      4.0 - 11.0 10e9/L 14.1 (H)    RBC Count      3.8 - 5.2 10e12/L 4.55    Hemoglobin      11.7 - 15.7 g/dL 10.7 (L)    Hematocrit      35.0 - 47.0 % 33.3 (L)    MCV      78 - 100 fl 73 (L)    MCH      26.5 - 33.0 pg 23.5 (L)    MCHC      31.5 - 36.5 g/dL 32.1    RDW      10.0 - 15.0 % 18.1 (H)    Platelet Count      150 - 450 10e9/L 407    Specimen Description           Lyme Screen IgG and  IgM           Vitamin D Deficiency screening      30 - 75 ug/L  32   Ferritin      10 - 300 ng/mL     Sed Rate      0 - 20 mm/h     ALLI Screen by EIA      <1.0     Rheumatoid Factor      0 - 14 IU/mL     CK Total      30 - 225 U/L     Uric Acid      2.5 - 7.5 mg/dL          MRI CERVICAL SPINE WITHOUT CONTRAST 4/3/2013 12:47 PM    HISTORY: Cervicalgia. Degeneration of cervical intervertebral disc.  MRI cervical spine. Evaluate bilateral supraclavicular lymph nodes.  Clinical enlargement.    TECHNIQUE: Multiplanar multisequence MRI of the cervical spine  without gadolinium contrast.    COMPARISON: None.    FINDINGS: The patient has a developmentally small central canal.  Images of the cervical cord reveals small areas of T2 hyperintensity  within the cervical cord. There is a small area of high signal  intensity at the C1 level. There is also a small area of high signal  intensity at the C6-C7 level. The area of high signal at C6-C7 is  located at an area of central spinal stenosis. This may be due to  myelomalacia. The area of high signal at C1-C2 is indeterminate.    C2-C3: Normal disc, facet joints, spinal canal and neural foramina.    C3-C4: Normal disc, facet joints, spinal canal and neural foramina.    C4-C5: Normal disc, facet joints, spinal canal and neural foramina.    C5-C6: There is degeneration of the disc. There is a mild annular  disc bulge. The central canal is mild-moderately narrowed. The  residual AP diameter of the central spinal canal measures  approximately 9 mm.    C6-C7: There is degeneration of the disc. There is loss of disc space  height. There is a diffuse annular disc bulge. There are associated  posterior osteophytes. There are severe central spinal stenosis at  this level. The residual AP diameter of the central spinal canal is  only about 6 mm. There is significant flattening of the cord. There  is high signal in the cord at this level consistent with  myelomalacia. There is moderate to  severe right foraminal stenosis  due to and uncinate spur.    C7-T1: Normal disc, facet joints, spinal canal and neural foramina.            Result Impression       IMPRESSION:  1. Severe central spinal stenosis at C6-C7 due to a developmentally  small canal and due to a bulging disc and associated posterior  osteophyte. There is flattening of the cord at this level and high  signal in the cord at this level consistent with myelomalacia.  2. There is a small, indeterminate 2-3 mm area of high signal  intensity in the cord at the C1 level. This could be due to gliosis  secondary to a previous infectious or inflammatory process.  Demyelinating disease could also have a similar appearance. Clinical  correlation suggested.    GAIL MORE MD     MRI LEFT UPPER EXTREMITY NON-JOINT WITHOUT CONTRAST, MRI RIGHT UPPER  EXTREMITY NON-JOINT WITHOUT CONTRAST April 3, 2013 1:32 PM    HISTORY: Cervicalgia. Degeneration of cervical intervertebral disc.    TECHNIQUE: Multiplanar, multisequence imaging of the brachial plexus  was performed without gadolinium contrast enhancement.    COMPARISON: None.    FINDINGS: No mass lesions are seen. No inflammatory process is  identified. The roots, trunks, branches, and divisions of both the  right and left brachial plexus appear normal. No adenopathy is seen.  The anterior scalene muscles and the middle scalene muscles appear  normal. Nodules are seen within the thyroid gland. The largest nodule  is seen in the left lobe of the thyroid. This measures about 1.8 cm  in diameter.            Result Impression       IMPRESSION:  1. Both the right and left brachial plexus regions appear normal.  2. Thyroid nodules. The largest nodule is in the left lobe of the  thyroid. It measures about 1.8 cm in diameter.       XR WRIST BILATERAL G/E 3 VIEWS    Narrative:        EXAMINATION:  1. Each hand 3 views  2. Each wrist 3 views     DATE: 5/1/2013     HISTORY: Arthropathy with concern for rheumatoid.      FINDINGS: 3 views of each hand and 3 views of each wrist are obtained  without prior for comparison. Alignment is normal. There is no  fracture or acute bone abnormality. No distinct erosions are seen.  Some spurring of the distal radial ulnar joint is noted on the right.       Impression:     IMPRESSION:  1. No evidence of an inflammatory arthropathy involving either hand  or wrist.     VIELKA GUEVARA MD         XR FOOT BILATERAL G/E 3 VIEWS    Narrative:        EXAMINATION:  1. Each foot 3 views  2. Each ankle 3 views  3. Sacroiliac joint series     DATE: 5/1/2013     HISTORY: Arthropathy; concern for rheumatoid.     FINDINGS: No prior for comparison.     A frontal and bilateral oblique evaluation of the sacroiliac joints  shows no malalignment. Some patchy sclerosis is identified along the  central aspect of both sacroiliac joints, which is favored to be  degenerative. No distinct erosions are seen. The visualized portion  of the hip joint spaces appear maintained, with no erosive changes  identified. Mild endplate spurring is noted in the lower lumbar  spine.     3 views of each foot and 3 views of each ankle show no evidence of  acute fracture or dislocation. The metatarsal phalangeal,  tarsometatarsal and intertarsal joint spaces appear maintained. No  erosions are identified. There is no sign of acroosteolysis. The  ankle mortise and talar dome are intact bilaterally. Minimal spurring  is noted along the tip of the medial malleolus bilaterally.       Impression:     IMPRESSION:  1. Patchy sclerosis identified along the central aspect of both  sacroiliac joints, which is favored to be degenerative. No distinct  erosions are seen.  2. No evidence of an inflammatory arthropathy in either foot or ankle.     VIELKA GUEVARA MD     5/2013  CBC WITH PLATELETS DIFFERENTIAL       Component Value Range    WBC 11.3 (*) 4.0 - 11.0 10e9/L    RBC Count 4.56  3.8 - 5.2 10e12/L    Hemoglobin 11.1 (*) 11.7 - 15.7 g/dL    Hematocrit  34.3 (*) 35.0 - 47.0 %    MCV 75 (*) 78 - 100 fl    MCH 24.3 (*) 26.5 - 33.0 pg    MCHC 32.4  31.5 - 36.5 g/dL    RDW 16.1 (*) 10.0 - 15.0 %    Platelet Count 315  150 - 450 10e9/L    Diff Method Automated Method      % Neutrophils 71.6  40 - 75 %    % Lymphocytes 20.9  20 - 48 %    % Monocytes 4.3  0 - 12 %    % Eosinophils 2.8  0 - 6 %    % Basophils 0.2  0 - 2 %    % Immature Granulocytes 0.2  0 - 0.4 %    Absolute Neutrophil 8.1  1.6 - 8.3 10e9/L    Absolute Lymphocytes 2.4  0.8 - 5.3 10e9/L    Absolute Monoctyes 0.5  0.0 - 1.3 10e9/L    Absolute Eosinophils 0.3  0.0 - 0.7 10e9/L    Absolute Basophils 0.0  0.0 - 0.2 10e9/L    Abs Immature Granulocytes 0.0  0 - 0.03 10e9/L   AMYLASE       Component Value Range    Amylase 103  30 - 110 U/L   COMPREHENSIVE METABOLIC PANEL       Component Value Range    Sodium 144  133 - 144 mmol/L    Potassium 3.8  3.4 - 5.3 mmol/L    Chloride 105  94 - 109 mmol/L    Carbon Dioxide 23  20 - 32 mmol/L    Anion Gap 17  6 - 17 mmol/L    Glucose 173 (*) 60 - 99 mg/dL    Urea Nitrogen 13  5 - 24 mg/dL    Creatinine 0.83  0.52 - 1.04 mg/dL    GFR Estimate 74  >60 mL/min/1.7m2    GFR Estimate If Black 90  >60 mL/min/1.7m2    Calcium 9.7  8.5 - 10.4 mg/dL    Bilirubin Total 0.4  0.2 - 1.3 mg/dL    Albumin 4.3  3.9 - 5.1 g/dL    Protein Total 7.8  6.8 - 8.8 g/dL    Alkaline Phosphatase 66  40 - 150 U/L    ALT 36  0 - 50 U/L    AST 28  0 - 45 U/L   CK TOTAL       Component Value Range    CK Total 66  30 - 225 U/L   CRP INFLAMMATION       Component Value Range    CRP Inflammation 10.4 (*) 0.0 - 8.0 mg/L   LIPASE       Component Value Range    Lipase 353 (*) 20 - 250 U/L   ERYTHROCYTE SEDIMENTATION RATE AUTO       Component Value Range    Sed Rate 26 (*) 0 - 20 mm/h   ROUTINE UA WITH MICROSCOPIC REFLEX TO CULTURE       Component Value Range    Color Urine Yellow      Appearance Urine Slightly Cloudy      Glucose Urine 30 (*) NEG mg/dL    Bilirubin Urine Negative  NEG    Ketones Urine 5 (*) NEG  mg/dL    Specific Gravity Urine 1.026  1.003 - 1.035    Blood Urine Trace (*) NEG    pH Urine 5.0  5.0 - 7.0 pH    Protein Albumin Urine 10 (*) NEG mg/dL    Urobilinogen mg/dL Normal  0.0 - 2.0 mg/dL    Nitrite Urine Negative  NEG    Leukocyte Esterase Urine Negative  NEG    Source Midstream Urine      WBC Urine 1  0 - 2 /HPF    RBC Urine 4 (*) 0 - 2 /HPF    Squamous Epithelial /HPF Urine <1  0 - 1 /HPF    Mucous Urine Present (*) NEG /LPF    Hyaline Casts 3 (*) 0 - 2 /LPF    Calcium Oxalate Moderate (*) NEG /HPF   COMPLEMENT C3       Component Value Range    Complement C3 143  76 - 169 mg/dL   COMPLEMENT C4       Component Value Range    Complement C4 31  15 - 50 mg/dL   HEPATITIS C ANTIBODY       Component Value Range    Hepatitis C Antibody Negative  NEG   CARDIOLIPIN ANTIBODY IGG AND IGM       Component Value Range    Cardiolipin IgG Marline    0 - 15.0 GPL    Value: <15.0      Interpretation:  Negative    Cardiolipin IgM Marline    0 - 12.5 MPL    Value: <12.5      Interpretation:  Negative   LUPUS PANEL       Component Value Range    Lupus Result    NEG    Value: Negative      (Note)      COMMENTS:      The INR is normal.      APTT is normal.  1:2 Mix is not indicated.      DRVVT Screen is normal.      Thrombin time is normal.      NEGATIVE TEST; A LUPUS ANTICOAGULANT WAS NOT DETECTED IN THIS      SPECIMEN WITHIN THE LIMITS OF THE TESTING REPERTOIRE.      If the clinical picture is strongly suggestive of an antiphospholipid      syndrome, recommend anticardiolipin and beta-2-glycoprotein (IgG and      IgM) antibody tests.      Leela Franks M.D.  [phone]      5/2/2013            INR =  0.93 N = 0.86-1.14  (No additional charge)      TT = 15.7 N = 13.0-19.0 sec  (No additional charge)            APTT'S:    Seconds      Reagent =  Stago LA      Normal  =  38      Patient  =  34      1:2 Mix  =  N/A      Reference =  31-43             DILUTE MADELINE VIPER VENOM TEST:      DRVVT Screen Ratio = 0.76 Normal =  <1.21         IMMUNOGLOBULIN G SUBCLASSES       Component Value Range    IGG 1030  695 - 1620 mg/dL    IgG1 488  300 - 856 mg/dL    IgG2 325  158 - 761 mg/dL    IgG3 47  24 - 192 mg/dL    IgG4 18  11 - 86 mg/dL   SUNNY ANTIBODY PANEL       Component Value Range    Ribonucleic Protein IgG Antibody 0      Smith Antibody IgG 1      SSA (RO) Antibody IgG 4      SSB (LA) Antibody IgG 0      Scleroderma Antibody IgG 0     BETA 2 GLYCOPROTEIN ANTIBODIES IGG IGM       Component Value Range    Beta-2-Glycopro IgG 1      Beta-2-Glycopro IgM 5     CYCLIC CIT PEPT IGG       Component Value Range    Cyclic Cit Pept IgG/IgA    <20 UNITS    Value: <20      Interpretation:  Negative   DNA DOUBLE STRANDED ANTIBODIES       Component Value Range    DNA-ds    0 - 29 IU/mL    Value: <15      Interpretation:  Negative       CT CHEST PULMONARY EMBOLISM W CONTRAST 5/20/2015 4:57 PM  HISTORY: Pain. SOB. Elevated d-dimer.   TECHNIQUE: 65 mL Isovue 370. Axial images with coronal  reconstructions.  COMPARISON: None.  FINDINGS: Calcified granuloma with surrounding scarring in the  posterolateral left upper lobe. Triangular shaped opacity at the right  lung base in the lateral right middle lobe could represent some  scarring, atelectasis or infiltrate. There is also some scarring or  atelectasis in the posteromedial right lung base. Lungs otherwise  clear.  The pulmonary arteries are well opacified. No CT evidence for acute  pulmonary embolus. No aortic dissection.  Diffuse fatty infiltration of the liver.  IMPRESSION  IMPRESSION:   1. No pulmonary embolus identified.  2. Small focus of atelectasis, infiltrate or scarring in the lateral  right middle lobe.  3. Old granulomatous disease.  4. Otherwise negative.  SILVERIO VAZQUEZ MD    Results for FAVIO MARTINEZ (MRN [id number]) as of 10/30/2015 17:00   Ref. Range 8/21/2012 09:06 5/22/2013 14:22 4/14/2014 12:06 9/11/2014 12:35 12/4/2014 16:38   TSH Latest Range: 0.40-4.00 mU/L 0.83 0.43 0.27 (L) 0.23  (L) 0.22 (L)     Component      Latest Ref Rng 10/30/2015   WBC      4.0 - 11.0 10e9/L 13.4 (H)   RBC Count      3.8 - 5.2 10e12/L 4.76   Hemoglobin      11.7 - 15.7 g/dL 12.4   Hematocrit      35.0 - 47.0 % 37.9   MCV      78 - 100 fl 80   MCH      26.5 - 33.0 pg 26.1 (L)   MCHC      31.5 - 36.5 g/dL 32.7   RDW      10.0 - 15.0 % 14.5   Platelet Count      150 - 450 10e9/L 324   Diff Method       Automated Method   % Neutrophils       67.7   % Lymphocytes       22.7   % Monocytes       6.3   % Eosinophils       2.7   % Basophils       0.4   % Immature Granulocytes       0.2   Absolute Neutrophil      1.6 - 8.3 10e9/L 9.1 (H)   Absolute Lymphocytes      0.8 - 5.3 10e9/L 3.1   Absolute Monoctyes      0.0 - 1.3 10e9/L 0.9   Absolute Eosinophils      0.0 - 0.7 10e9/L 0.4   Absolute Basophils      0.0 - 0.2 10e9/L 0.1   Abs Immature Granulocytes      0 - 0.4 10e9/L 0.0   Creatinine      0.52 - 1.04 mg/dL 1.21 (H)   GFR Estimate      >60 mL/min/1.7m2 47 (L)   GFR Estimate If Black      >60 mL/min/1.7m2 57 (L)   Iron      35 - 180 ug/dL 70   Iron Binding Cap      240 - 430 ug/dL 428   Iron Saturation Index      15 - 46 % 16   Albumin      3.4 - 5.0 g/dL 4.6   ALT      0 - 50 U/L 29   AST      0 - 45 U/L 21   CRP Inflammation      0.0 - 8.0 mg/L <2.9   Sed Rate      0 - 20 mm/h 8   Vitamin D Deficiency screening      20 - 75 ug/L 46   Ferritin      8 - 252 ng/mL 18   TSH      0.40 - 4.00 mU/L 0.49   T4 Free      0.76 - 1.46 ng/dL 1.06   Free T3      2.3 - 4.2 pg/mL 2.8     Component      Latest Ref Rn 11/17/2015   Testosterone Total      8 - 60 ng/dL 19   Sex Hormone Binding Globulin      30 - 135 nmol/L 32   Free Testosterone Calculated      0.11 - 0.58 ng/dL 0.34   Vitamin A       0.61   Retinol Palmitate       <0.02 . . .   Vitamin A Interp       Normal . . .   Thyroglobulin Antibody      <40 IU/mL <20   Thyroid Peroxidase Antibody      <35 IU/mL 31   DHEA Sulfate      35 - 430 ug/dL 101   Zinc       68     Component       Latest Ref Rng 1/27/2016   Sodium      133 - 144 mmol/L 135   Potassium      3.4 - 5.3 mmol/L 4.0   Chloride      94 - 109 mmol/L 104   Carbon Dioxide      20 - 32 mmol/L 24   Anion Gap      3 - 14 mmol/L 7   Glucose      70 - 99 mg/dL 88   Urea Nitrogen      7 - 30 mg/dL 20   Creatinine      0.52 - 1.04 mg/dL 1.13 (H)   GFR Estimate      >60 mL/min/1.7m2 51 (L)   GFR Estimate If Black      >60 mL/min/1.7m2 62   Calcium      8.5 - 10.1 mg/dL 9.4   Bilirubin Total      0.2 - 1.3 mg/dL 0.7   Albumin      3.4 - 5.0 g/dL 4.3   Protein Total      6.8 - 8.8 g/dL 7.7   Alkaline Phosphatase      40 - 150 U/L 66   ALT      0 - 50 U/L 24   AST      0 - 45 U/L 18   Cholesterol      <200 mg/dL 112   Triglycerides      <150 mg/dL 100   HDL Cholesterol      >49 mg/dL 34 (L)   LDL Cholesterol Calculated      <100 mg/dL 58   Non HDL Cholesterol      <130 mg/dL 78   N-Terminal Pro Bnp      0 - 125 pg/mL 29   Hemoglobin A1C      4.3 - 6.0 % 7.0 (H)   Amylase      30 - 110 U/L 60   Lipase      73 - 393 U/L 394 (H)   Troponin I ES      0.000 - 0.045 ug/L <0.015 . . .     Component      Latest Ref Rng 2/24/2016   Angiotensin Converting Enzyme       <5 (L) . . .   Neutrophil Cytoplasmic IgG Antibody       <1:20 . . .   Sed Rate      0 - 20 mm/h 86 (H)     Copath Report      Patient Name: FAVIO MARTINEZ   MR#: [id number]   Specimen #: [id number]   Collected: 3/15/2016   Received: 3/15/2016   Reported: 3/17/2016 13:33   Ordering Phy(s): PAREVEN ENRIQUEZ     ORIGINAL REPORT FOLLOWS ADDENDUM  ADDENDUM     TO ORIGINAL REPORT   Status: Signed Out   Date Ordered:3/18/2016   Date Complete:3/18/2016   Date Reported:3/18/2016 12:06   Signed Out By: Marianne Godfrey MD     INTERPRETATION:   This addendum is done to incorporate the results of fungal (GMS) stains   done on the specimen.  Specimen is negative for fungal organisms.  The   original final diagnosis remains unchanged.     __________________________________________     ORIGINAL  "REPORT:     SPECIMEN(S):   Right orbital biopsy     FINAL DIAGNOSIS:   Right orbital mass, biopsy-   - Portion of lacrimal gland with acute and chronic dacryoadenitis   associated with microabscess formation.  Negative for malignancy(Please   see microscopic description)     Electronically signed out by:     Marianne Godfrey MD     CLINICAL HISTORY:   right orbital mass     GROSS:   The specimen is received labeled \"right orbital biopsy\" and consists of   red-tan nodule measuring 1.5 x 0.9 x 0.6 cm with one smooth side and   opposite rough side consistent with periosteum.  The specimen is   bisected revealing homogenous pale tan fleshy cut surface.  Touch   preparations are prepared, air dried and fixed, portion of specimen is   submitted in RPMI for possible lymphoma workup [facility],   ([facility] [employer], [place], [place] ).  The remainder is entirely submitted.   (Dictated by: Marianne Godfrey MD 3/15/2016 04:45 PM)     MICROSCOPIC:     Specimen consists of portion of lacrimal gland with acute and chronic   inflammation.  Focal area of microabscess formation associated with   small areas of necrosis are also present.  Inflammation is seen   extending to the periorbital adipose tissue forming panniculitis.   Specimen is negative for malignancy.  Samples sent for immunophenotyping   to [facility], ([facility], [place], [place] ) reveals no evidence   of monoclonality or aberrant antigen expression.  A GMS (fungal) stain   is pending and results will be reported as an addendum.     CPT Codes:   A: 87440-AY0, 28363-CQWQB, SOH, 66612-PISZI, 06837-ZPFW     TESTING LAB LOCATION:   [address]  [address] [phone]     COLLECTION SITE:   [facility]   [facility]            Component      Latest Ref Rng 4/29/2016   Nucleated RBCs      0 /100 0   Absolute Neutrophil      1.6 - 8.3 10e9/L 8.9 (H)   Absolute Lymphocytes      0.8 - " 5.3 10e9/L 3.2   Absolute Monocytes      0.0 - 1.3 10e9/L 0.8   Absolute Eosinophils      0.0 - 0.7 10e9/L 0.2   Absolute Basophils      0.0 - 0.2 10e9/L 0.1   Abs Immature Granulocytes      0 - 0.4 10e9/L 0.1   Absolute Nucleated RBC       0.0   IGG      695 - 1620 mg/dL 836   IgG1      300 - 856 mg/dL 280 (L)   IgG2      158 - 761 mg/dL 277   IgG3      24 - 192 mg/dL 35   IgG4      11 - 86 mg/dL 16   RNP Antibody IgG      0.0 - 0.9 AI <0.2 . . .   Smith SUNNY Antibody IgG      0.0 - 0.9 AI <0.2 . . .   SSA (Ro) (SUNNY) Antibody, IgG      0.0 - 0.9 AI <0.2 . . .   SSB (La) (SUNNY) Antibody, IgG      0.0 - 0.9 AI <0.2 . . .   Scleroderma Antibody Scl-70 SUNNY IgG      0.0 - 0.9 AI <0.2 . . .   Cholesterol      <200 mg/dL 154   Triglycerides      <150 mg/dL 220 (H)   HDL Cholesterol      >49 mg/dL 42 (L)   LDL Cholesterol Calculated      <100 mg/dL 67   Non HDL Cholesterol      <130 mg/dL 111   M Tuberculosis Result      NEG Negative   M Tuberculosis Antigen Value       0.00   Albumin      3.4 - 5.0 g/dL 4.3   ALT      0 - 50 U/L 30   AST      0 - 45 U/L 10   Complement C3      76 - 169 mg/dL 157   Complement C4      15 - 50 mg/dL 32   CRP Inflammation      0.0 - 8.0 mg/L <2.9   Sed Rate      0 - 20 mm/h 2   DNA-ds      <10 IU/mL 1   Cyclic Citrullinated Peptide Antibody, IgG      <7 U/mL 1   Rheumatoid Factor      <20 IU/mL <20   Proteinase 3 Antibody IgG      0.0 - 0.9 AI <0.2 . . .   Myeloperoxidase Antibody IgG      0.0 - 0.9 AI 2.5 (H)   Vitamin D Deficiency screening      20 - 75 ug/L 24   Hemoglobin A1C      4.3 - 6.0 % 7.9 (H)   ALLI by IFA IgG       1:40 (A) . . .     Component      Latest Ref Rng & Units 4/7/2017   WBC      4.0 - 11.0 10e9/L 11.3 (H)   RBC Count      3.8 - 5.2 10e12/L 4.77   Hemoglobin      11.7 - 15.7 g/dL 12.5   Hematocrit      35.0 - 47.0 % 38.7   MCV      78 - 100 fl 81   MCH      26.5 - 33.0 pg 26.2 (L)   MCHC      31.5 - 36.5 g/dL 32.3   RDW      10.0 - 15.0 % 13.2   Platelet Count      150 -  450 10e9/L 347   Diff Method       Automated Method   % Neutrophils      % 67.1   % Lymphocytes      % 22.9   % Monocytes      % 6.2   % Eosinophils      % 3.0   % Basophils      % 0.4   % Immature Granulocytes      % 0.4   Nucleated RBCs      0 /100 0   Absolute Neutrophil      1.6 - 8.3 10e9/L 7.5   Absolute Lymphocytes      0.8 - 5.3 10e9/L 2.6   Absolute Monocytes      0.0 - 1.3 10e9/L 0.7   Absolute Eosinophils      0.0 - 0.7 10e9/L 0.3   Absolute Basophils      0.0 - 0.2 10e9/L 0.1   Abs Immature Granulocytes      0 - 0.4 10e9/L 0.1   Absolute Nucleated RBC       0.0   IGG      695 - 1620 mg/dL 962   IgG1      300 - 856 mg/dL Test sent to [facility]. See ARMISC.   IgG2      158 - 761 mg/dL Test sent to [facility]. See ARMISC.   IgG3      24 - 192 mg/dL Test sent to [facility]. See ARMISC.   IgG4      11 - 86 mg/dL Test sent to [facility]. See ARMISC.   Result       SEE NOTE . . .   Test Name       IGG SUBCLASSES   Send Outs Misc Test Code       53927   Send Outs Misc Test Specimen       Serum   Creatinine      0.52 - 1.04 mg/dL 1.20 (H)   GFR Estimate      >60 mL/min/1.7m2 47 (L)   GFR Estimate If Black      >60 mL/min/1.7m2 57 (L)   Creatinine Urine      mg/dL    Albumin Urine mg/L      mg/L    Albumin Urine mg/g Cr      0 - 25 mg/g Cr    Myeloperoxidase Antibody IgG      0.0 - 0.9 AI 2.9 (H)   Proteinase 3 Antibody IgG      0.0 - 0.9 AI <0.2 . . .   Neutrophil Cytoplasmic IgG Antibody       1:80 (A) . . .   Angiotensin Converting Enzyme       <5 (L) . . .   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      Comparison: MRI brain 5/29/2013      Technique:   Orbits: Axial and coronal T1-weighted, and coronal T2-weighted images  obtained without intravenous contrast. Post-intravenous contrast  (using gadolinium) sagittal FLAIR, were obtained with fat-saturation,  focused on the orbits.  Brain: Axial susceptibility-weighted and FLAIR sequences were obtained  of the whole brain without intravenous contrast, and postcontrast  axial T1-weighted images were obtained through the whole brain.   Contrast: 7.5mL Gadavist     Findings:  New asymmetric enlargement of the right lacrimal gland and enhancement  of the right lacrimal gland with surrounding ill-defined crescentic T2  hyperintense signal and enhancement within the right superior  superotemporal orbit, predominantly involving the extraconal space  with slight intraconal extension. No definite associated restricted  diffusion. No discrete extraocular muscle enlargement. Normal  symmetric optic nerve signal. Cavernous sinuses and orbital apices are  normal. Globes are normal.     Whole brain images are symmetric minimal leukoaraiosis and generalized  parenchymal volume loss. Ventricles are not enlarged. No intracranial  hemorrhage, mass effect, midline shift or abnormal extra axial fluid  collection. No abnormal foci of intracranial enhancement or restricted  diffusion. Paranasal sinuses and mastoid air cells are clear.            Impression:    New abnormal enlargement of the right lacrimal gland with surrounding  ill-defined T2 hyperintense signal and enhancement centered in the  superotemporal right orbital fat, which may represent   infectious/inflammatory dacryadenitis, orbital pseudotumor, lymphoma,  carcinoma, sarcoidosis or IgG4 disease..     I have personally reviewed the examination and initial interpretation  and I agree with the findings.     PATRICIA  MD KARON      Component      Latest Ref Rng & Units 6/1/2017   WBC      4.0 - 11.0 10e9/L 12.0 (H)   RBC Count      3.8 - 5.2 10e12/L 5.18   Hemoglobin      11.7 - 15.7 g/dL 13.8   Hematocrit      35.0 - 47.0 % 41.0   MCV      78 - 100 fl 79   MCH      26.5 - 33.0 pg 26.6   MCHC      31.5 - 36.5 g/dL 33.7   RDW      10.0 - 15.0 % 14.8   Platelet Count      150 - 450 10e9/L 322   Diff Method       Automated Method   % Neutrophils      % 76.7   % Lymphocytes      % 16.5   % Monocytes      % 4.6   % Eosinophils      % 1.9   % Basophils      % 0.3   Absolute Neutrophil      1.6 - 8.3 10e9/L 9.2 (H)   Absolute Lymphocytes      0.8 - 5.3 10e9/L 2.0   Absolute Monocytes      0.0 - 1.3 10e9/L 0.6   Absolute Eosinophils      0.0 - 0.7 10e9/L 0.2   Absolute Basophils      0.0 - 0.2 10e9/L 0.0   Color Urine       Yellow   Appearance Urine       Clear   Glucose Urine      NEG mg/dL Negative   Bilirubin Urine      NEG Negative   Ketones Urine      NEG mg/dL Negative   Specific Gravity Urine      1.003 - 1.035 1.010   pH Urine      5.0 - 7.0 pH 5.5   Protein Albumin Urine      NEG mg/dL Negative   Urobilinogen Urine      0.2 - 1.0 EU/dL 0.2   Nitrite Urine      NEG Negative   Blood Urine      NEG Negative   Leukocyte Esterase Urine      NEG Negative   Source       Midstream Urine   WBC Urine      0 - 2 /HPF O - 2   RBC Urine      0 - 2 /HPF O - 2   Squamous Epithelial /LPF Urine      FEW /LPF Few   Bacteria Urine      NEG /HPF Few (A)   Creatinine      0.52 - 1.04 mg/dL 1.19 (H)   GFR Estimate      >60 mL/min/1.7m2 48 (L)   GFR Estimate If Black      >60 mL/min/1.7m2 58 (L)   Protein Random Urine      g/L <0.05   Protein Total Urine g/gr Creatinine      0 - 0.2 g/g Cr Unable to calculate due to low value   Myeloperoxidase Antibody IgG      0.0 - 0.9 AI 1.9 (H)   Proteinase 3 Antibody IgG      0.0 - 0.9 AI <0.2 . . .   ALT      0 - 50 U/L 29   AST      0 - 45 U/L 18   Albumin      3.4 - 5.0 g/dL 4.6   CRP Inflammation      0.0  "- 8.0 mg/L 6.1   Sed Rate      0 - 30 mm/h 13   Creatinine Urine Random      mg/dL 54   Neutrophil Cytoplasmic IgG Antibody       <1:20 . . .   Creatinine Urine      mg/dL 54       Patient Name: FAVIO MARTINEZ   MR#: [id number]   Specimen #: [id number]   Collected: 8/4/2017   Received: 8/4/2017   Reported: 8/9/2017 16:19   Ordering Phy(s): CRISTHIAN FLORES     For improved result formatting, select 'View Enhanced Report Format'   under Linked Documents section.     SPECIMEN(S):   Eye, right lacrimal gland     FINAL DIAGNOSIS:   Eye, right, \"lacrimal gland\", biopsy   - Dense fibrosis and patchy inflammation   - No residual lacrimal gland seen     COMMENT:   Sections show tissue involved by dense fibrosis and patchy inflammation.   There is no morphologic or immunohistochemical evidence of lymphoma. By   separate report, concurrent flow cytometry studies ([id number]),   performed at the [facility], show polytypic B cells and no   aberrant immunophenotype on T cells. Immunohistochemical stains for IgG   and IgG-4 were performed, which provide no support for IgG-4-related   disease. Some of these changes may be related to prior surgery. Sjögren   disease is not excluded. There is no evidence of malignancy. Dr. Max Conway has reviewed this case and concurs.     Electronically signed out by:     Antonella Beth M.D.   INDICATION:  Right eye edema. Reported history of right orbital biopsy yielding pathology consistent with idiopathic inflammation.    TECHNIQUE:  Noncontrast CT images were obtained through the brain and facial bones.    COMPARISON:  CT sinus 03/27/2017, MRI brain and orbits 02/15/2016.     FINDINGS:  CT facial bones:   Large soft tissue lesion centered within the lateral intraconal and extraconal right orbit has significantly increased in size compared to the CT dated 03/27/2017. The lesion is inseparable from the right superior, lateral, and inferior rectus muscles, as well as the right " lacrimal gland. The lesion demonstrates extension posteriorly slightly proximal to the orbital apex, as well as extension into the medial orbit superiorly and anteriorly. Mass effect includes medial bowing of the optic nerve sheath complex and pronounced proptosis of the right globe.  No mass is identified in the right orbit.  Torus palatinus.   Minimal mucosal thickening in the ethmoid air cells. The remainder of the visualized paranasal sinuses are clear.  CT brain:  The ventricles and sulci within normal limits for patient age. No mass effect or midline shift. The gray-white differentiation is maintained.  No acute intracranial hemorrhage or pathologic extra-axial fluid collection.  The calvarium is intact. The mastoid air cells are clear.    IMPRESSION:  1. Large soft tissue lesion centered within the lateral intraconal and extraconal right orbit has significantly increased in size compared to the CT dated 03/27/2017. The lesion is inseparable from the right lacrimal gland and rectus musculature. Mass effect includes medial bowing of the optic nerve sheath complex and pronounced proptosis of the right globe. Given provided history, findings may represent a progressive inflammatory etiology (pseudotumor). Other differential considerations, such as sarcoidosis or lymphoma, could also have this appearance.  2. No acute intracranial hemorrhage or intracranial mass effect.    Please note that all CT scans at this facility use dose modulation, iterative reconstruction, and/or weight-based dosing when appropriate to reduce radiation dose to as low as reasonably achievable.    Dictated by Charles Ward MD @ Jul 16 2018  3:54PM    Signed by Dr. Charles Ward @ Jul 16 2018  4:20PM    CT ORBITAL WO CONTRAST 7/11/2019 3:42 PM     History: orbital pseudotumor; Subjective visual disturbance; Visual  field defect; Idiopathic orbital inflammatory syndrome  ICD-10: Subjective visual disturbance; Visual field defect;  Idiopathic  orbital inflammatory syndrome     Comparison: CT 3/6/2019 and 7/16/2018, MRI brain 5/9/2017                                                                   Impression:   1. 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  2. Slightly decreased right orbital proptosis since 3/6/2019.      ROS:  A comprehensive ROS was done, positives are per HPI.        HISTORY REVIEW:  Past Medical History:   Diagnosis Date     Abnormal glandular Papanicolaou smear of cervix 1992     Allergic rhinitis     Allergy, airborne subst     Arthritis      ASCVD (arteriosclerotic cardiovascular disease)      Chronic pain      Chronic pancreatitis (H)     idiopathic, Tx: PPI, antioxidants, pancreatic enzymes     Common migraine      Coronary artery disease      Costochondritis      Difficulty in walking(719.7)      Dyspnea on exertion      Ectasia, mammary duct     [facility], persistent nipple discharge     Elevated fasting glucose      Gastro-oesophageal reflux disease      Hernia      History of angina      Hyperlipidemia LDL goal < 100      Hypertension goal BP (blood pressure) < 140/90     Essential hypertension     Iron deficiency anemia      Ischemic cardiomyopathy      Menorrhagia      Migraine headaches      Mild persistent asthma      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 Matias, neg w/u, Rx'd w/prednisone     Ventral hernia, unspecified, without mention of obstruction or gangrene 2012     Past Surgical History:   Procedure Laterality Date     C ECHO HEART XTHORACIC,COMPLETE, W/O DOPPLER  04    [facility] WNL, EF 60%     C/SECTION, LOW TRANSVERSE           CARDIAC SURGERY      cardiac stent- recent in 16; 4 other stents     DILATION AND CURETTAGE N/A 2016    Procedure: DILATION AND CURETTAGE;  Surgeon: Nahed Butler MD;  Location: [facility] OR     HC UGI ENDOSCOPY W EUS  08    Dr. Pastrana, possible chronic pancreatitis, fatty liver     HERNIA REPAIR  2012    done at [facility]     INSERT INTRAUTERINE DEVICE N/A 2016    Procedure: INSERT INTRAUTERINE DEVICE;  Surgeon: Nahed Butler MD;  Location: [facility]     INT UTERINE FIBRIOD EMBOLIZATION  10/29/2014     LAPAROSCOPIC CHOLECYSTECTOMY  08    Dr. Arnol GRUBBS TX, CERVICAL      s/p LEEP     ORBITOTOMY Right 3/15/2016    Procedure: ORBITOTOMY;  Surgeon: Myron Cyr MD;  Location: [facility]     ORBITOTOMY Right 2017    Procedure: ORBITOTOMY;  RIGHT ORBITOTOMY AND BIOPSY;  Surgeon: Charis Holbrook MD;  Location: [facility]     REPAIR PTOSIS Right 2017    Procedure: REPAIR PTOSIS;  RIGHT UPPER LID PTOSIS REPAIR;  Surgeon: Myron Cyr MD;  [facility]     UPPER GI ENDOSCOPY  10/21/08    mild gastritis, Dr. Hidalgo     Family History   Problem Relation Age of Onset     Heart Disease Father 50        heart attack     Cerebrovascular Disease Father      Diabetes Father      Hypertension Father      Depression Father      C.A.D. Father      Hypertension Mother      Arthritis Mother      Heart Disease Mother         long qt syndrome     Thyroid Disease Mother      C.A.D. Mother       Heart Disease Brother 15        MI at 15, 16.      Diabetes Maternal Uncle      Hypertension Maternal Aunt      Hypertension Maternal Uncle      Arthritis Brother         he passed away, had severe arthritis at age 11     Arthritis Maternal Uncle      Eye Disorder Maternal Uncle         cataracts     Neurologic Disorder Sister         migraines     Neurologic Disorder Sister         migraines     Respiratory Son         asthma     Cerebrovascular Disease Maternal Uncle      C.A.D. Brother      Family History Negative Other         neg for RA, SLE     Unknown/Adopted No family hx of         unknown neurological issues in her family, mother was adopted     Skin Cancer No family hx of         No known family hx of skin cancer     Social History     Socioeconomic History     Marital status: Single     Spouse name: Not on file     Number of children: 1     Years of education: Not on file     Highest education level: Not on file   Occupational History     Employer: NONE    Social Needs     Financial resource strain: Not on file     Food insecurity:     Worry: Not on file     Inability: Not on file     Transportation needs:     Medical: Not on file     Non-medical: Not on file   Tobacco Use     Smoking status: Current Every Day Smoker     Packs/day: 0.20     Years: 1.00     Pack years: 0.20     Types: Cigarettes     Last attempt to quit: 2016     Years since quitting: 3.4     Smokeless tobacco: Never Used   Substance and Sexual Activity     Alcohol use: No     Alcohol/week: 0.0 oz     Drug use: No     Sexual activity: Not Currently   Lifestyle     Physical activity:     Days per week: Not on file     Minutes per session: Not on file     Stress: Not on file   Relationships     Social connections:     Talks on phone: Not on file     Gets together: Not on file     Attends Gnosticist service: Not on file     Active member of club or organization: Not on file     Attends meetings of clubs or organizations: Not on  file     Relationship status: Not on file     Intimate partner violence:     Fear of current or ex partner: Not on file     Emotionally abused: Not on file     Physically abused: Not on file     Forced sexual activity: Not on file   Other Topics Concern     Parent/sibling w/ CABG, MI or angioplasty before 65F 55M? Yes   Social History Narrative    Balanced Diet - sometimes    Osteoporosis Prevention Measures - Dairy servings per day: 2 servings weekly    Regular Exercise -  Yes Describe walking 4 times a week    Dental Exam - NO    Seatbelts used - Yes    Self Breast Exam - Yes    Abuse: Current or Past (Physical, Sexual or Emotional)- No    Do you have any concerns about STD's -  No    Do you feel safe in your environment - No    Guns stored in the home - No    Sunscreen used - Yes    Lipids -  YES - Date: 053102    Glucose -  YES - Date: 012804    Eye Exam - YES - Date: one year ago    Colon Cancer Screening - No    Hemoccults - NO    Pap Test -  YES - Date: 070904, remote history of LEEP    Mammography - YES - Date: last spring, would like to discuss, needs a referral to [facility]    DEXA - NO    Immunizations reviewed and up to date - Yes, last td given in 1997 or 1998     Patient Active Problem List   Diagnosis     Seasonal affective disorder (H)     Allergic rhinitis     PCOS (polycystic ovarian syndrome)     Moderate persistent asthma     Chronic pancreatitis (H)     Hypertension goal BP (blood pressure) < 140/90     Common migraine     NSTEMI (non-ST elevated myocardial infarction) (H)     Hyperlipidemia LDL goal <70     ASCVD (arteriosclerotic cardiovascular disease)     GERD (gastroesophageal reflux disease)     Ischemic cardiomyopathy     Hypertensive heart disease     Uterine leiomyoma     Iron deficiency anemia     Costochondritis     Vitamin D deficiency     Breast cancer screening     Spinal stenosis in cervical region     Fibromyalgia     Seronegative rheumatoid arthritis (H)      Type 2 diabetes, HbA1C goal < 8% (H)     Type 2 diabetes mellitus with other specified complication (H)     Hyperlipidemia associated with type 2 diabetes mellitus (H)     Hypertension associated with diabetes (H)     Overweight with body mass index (BMI) 25.0-29.9     Tobacco use disorder     Telogen effluvium     CAD S/P percutaneous coronary angioplasty     Status post coronary angiogram     ANCA-associated vasculitis (H)     Wegener's vasculitis (H)     Type 1 diabetes mellitus with complications (H)       Pregnancy Hx: She is . All misscarriages were in first trimester. H/o OC use in the past. 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    PMHx, FHx, SHx were reviewed, unchanged.      Outpatient Encounter Medications as of 2019   Medication Sig Dispense Refill     acetaminophen (TYLENOL) 325 MG tablet Take 1-2 tablets (325-650 mg) by mouth every 6 hours as needed for pain (headache) 250 tablet 0     albuterol (2.5 MG/3ML) 0.083% neb solution INL 1 VIAL VIA NEBULIZATION Q 4 TO 6 HOURS PRN  1     albuterol (PROAIR HFA, PROVENTIL HFA, VENTOLIN HFA) 108 (90 BASE) MCG/ACT inhaler Inhale 2 puffs into the lungs every 6 hours as needed for shortness of breath / dyspnea or wheezing 3 Inhaler 1     ASPIRIN LOW DOSE 81 MG EC tablet Take 1 tablet (81 mg) by mouth daily 30 tablet 11     BASAGLAR 100 UNIT/ML injection Inject 40 Units Subcutaneous daily 12 mL 2     blood glucose monitoring (NO BRAND SPECIFIED) meter device kit Use to test blood sugar 4 X times daily or as directed. (Patient taking differently: 1 kit Use to test blood sugar 4 X times daily or as directed.) 1 kit 0     blood glucose monitoring (NO BRAND SPECIFIED) test strip 1 strip by In Vitro route 4 times daily Test as directed. Please dispense three months and three months of refills. Thank you. (Patient taking differently: 1 strip by In Vitro route 4 times daily Test as directed. Please dispense three months and three months of  detailed exam refills. Thank you.) 360 each 3     Blood Pressure Monitor KIT 1 each daily Monitor home blood pressure as instructed by physician.  Dispense [place] blood pressure kit. 1 kit 0     calcium carbonate (TUMS) 500 MG chewable tablet Take 3-4 chew tab by mouth daily as needed.       clopidogrel (PLAVIX) 75 MG tablet Take 1 tablet (75 mg) by mouth daily 30 tablet 11     COMPRESSION STOCKINGS 2 each daily Apply one 10-15 mmHg compression stocking to each lower extgmierty during the day and remove before bedtime. 4 each 2     cyclobenzaprine (FLEXERIL) 10 MG tablet Take 1 tablet (10 mg) by mouth 2 times daily as needed for muscle spasms 60 tablet 2     cycloSPORINE (RESTASIS) 0.05 % ophthalmic emulsion Place 1 drop into both eyes 2 times daily 60 each 11     cycloSPORINE (RESTASIS) 0.05 % ophthalmic emulsion Place 1 drop into the right eye every 12 hours       desonide (DESOWEN) 0.05 % cream Apply topically 2 times daily       dicyclomine (BENTYL) 20 MG tablet TAKE 1 TABLET(20 MG) BY MOUTH FOUR TIMES DAILY AS NEEDED 60 tablet 3     diphenhydrAMINE (BENADRYL) 25 MG capsule TK 1 TO 2 CS PO QHS  4     EPIPEN 2-RIKY 0.3 MG/0.3ML injection INJECT 0.3 MG INTO THE MUSCLE PRF ANAPHYALAXIS  0     ferrous gluconate (FERGON) 324 (38 Fe) MG tablet Take 1 tablet (324 mg) by mouth 2 times daily (with meals) 180 tablet 3     fexofenadine (ALLEGRA) 180 MG tablet Take 1 tablet by mouth daily as needed. 90 tablet 3     fluocinolone (SYNALAR) 0.01 % solution Apply topically daily as needed       fluticasone-vilanterol (BREO ELLIPTA) 100-25 MCG/INH inhaler Inhale 1 puff into the lungs daily       folic acid (FOLVITE) 1 MG tablet Take 1 tablet by mouth daily 90 tablet 3     hydroxychloroquine (PLAQUENIL) 200 MG tablet Take 2 tablets (400 mg) by mouth daily 180 tablet 1     insulin pen needle (BD MARCK U/F) 32G X 4 MM USE DAILY OR AS DIRECTED 300 each 3     ketoconazole (NIZORAL) 2 % shampoo Apply topically daily as needed        lisinopril-hydrochlorothiazide (PRINZIDE/ZESTORETIC) 20-25 MG tablet Take 1 tablet by mouth daily 30 tablet 11     Magnesium Oxide -Mg Supplement 250 MG TABS TK 1 T PO BID. REDUCE IF STOOLS LOOSEN  11     metFORMIN (GLUCOPHAGE-XR) 500 MG 24 hr tablet TAKE 2 TABLETS(1000 MG) BY MOUTH DAILY WITH DINNER 180 tablet 0     metoclopramide (REGLAN) 10 MG tablet Take 1 tablet (10 mg) by mouth 4 times daily (before meals and nightly) 90 tablet 0     metoprolol succinate ER (TOPROL-XL) 100 MG 24 hr tablet Take 1 tablet (100 mg) by mouth daily 30 tablet 11     metroNIDAZOLE (NORITATE) 1 % cream Apply topically daily       montelukast (SINGULAIR) 10 MG tablet Take 1 tablet (10 mg) by mouth At Bedtime 30 tablet 1     Multiple Vitamin (DAILY-GERALD) TABS Take 1 tablet by mouth daily  0     nitroGLYcerin (NITROSTAT) 0.4 MG sublingual tablet Place 1 tablet (0.4 mg) under the tongue every 5 minutes as needed for chest pain 25 tablet 1     nystatin (MYCOSTATIN) 459093 UNITS TABS tablet TK 2 TS PO BID  0     ondansetron (ZOFRAN-ODT) 8 MG ODT tab Take 1 tablet (8 mg) by mouth every 8 hours as needed for nausea 60 tablet 1     pantoprazole (PROTONIX) 40 MG EC tablet Take 1 tablet (40 mg) by mouth daily Pork free tablets. 30 tablet 1     pravastatin (PRAVACHOL) 40 MG tablet Take 1 tablet (40 mg) by mouth daily 90 tablet 2     ranitidine (ZANTAC) 150 MG tablet Take 1 tablet (150 mg) by mouth 2 times daily 180 tablet 1     spironolactone (ALDACTONE) 50 MG tablet Take 1 tablet (50 mg) by mouth daily . Take additional 0.5 tablets by mouth once daily as needed for weight gain. 90 tablet 2     sucralfate (CARAFATE) 1 GM tablet Take 1 tablet (1 g) by mouth 4 times daily 120 tablet 3     traMADol (ULTRAM) 50 MG tablet Take 1 tablet (50 mg) by mouth every 8 hours as needed for moderate pain 60 tablet 3     Triamcinolone Acetonide (NASACORT ALLERGY 24HR NA)        vitamin D (ERGOCALCIFEROL) 77046 UNIT capsule Take 1 capsule (50,000 Units) by mouth  "every 7 days Need a Vitamin D level in 2 months. (Patient taking differently: Take 50,000 Units by mouth every 7 days Need a Vitamin D level in 2 months.) 8 capsule 0     lifitegrast (XIIDRA) 5 % opthalmic solution Place 1 drop into both eyes 2 times daily (Patient not taking: Reported on 6/3/2019) 20 each 3     [] order for DME 1 Device once for 1 dose Equipment being ordered: Wedge pillow 1 each 0     No facility-administered encounter medications on file as of 2019.        Allergies   Allergen Reactions     Amoxicillin-Pot Clavulanate      Augmentin      Unknown possible hives and edema     Azithromycin      Diatrizoate Other (See Comments)     Pt wants this listed because she is allergic to shellfish      Imitrex [Sumatriptan]      Severe face/neck/chest tightness and flushing side effects      Penicillins Hives     Unknown      Pork Allergy      Stomach pain, cramping, diarrhea, itching, nausea and headaches     Shellfish Allergy Hives and Swelling     Sulfa Drugs Hives and Swelling     Zithromax [Azithromycin Dihydrate] Swelling     Unknown          Ph.E:    Vitals:    19 1430 19 1434   BP: 156/81 125/85   Pulse: 83    SpO2: 98%    Weight: 77.3 kg (170 lb 6.4 oz)    Height: 1.6 m (5' 3\")            Constitutional: WD/WN. Pleasant. In no acute distress.   Eyes: Swelling around R eye significantly improved since last visit. EOMI. Mild fullness still appreciable on R globe.  HEENT: No oral ulcers or thrush. Normal salivary pool.  Lymph: No cervical, supraclavicular, or axillary LAD.  Chest: Clear to auscultation bilaterally, Normal work of breathing.  CV: RRR, no murmurs/ rubs or gallops. No edema, clubbing or cyanosis.   GI: Abdomen is soft and non tender.  MS: No synovitis or joint tenderness. Cool joints. No joint deformities. Full ROM of the joints. No nodules.   Skin: No rashes or lesions noted. No malar rash.  Neuro: A&O x 3. Grossly non focal, muscular power 5/5 in all ext  Psych: " NL affect and mood    Assessment/ plan:    1-Seropositive non-erosive RA (arthritis, AM stiffness, high CRP, RF 26 but re-check neg 3/2015 on HCQ) Dx 5/2013, FMS, new pleuritic CP 3/20-15-5/2015 resolved on steroids. GPA. She is on  mg bid since 5/2014. She tolerates it well and it's helping some but not enough to control all her pain. Continues having flare ups of joint pain, swelling also has FMS. Joint sx are getting worse. Had high ESR/CRP in 3/2015 and new pleuritic CP between 3/2015-5/2015 which resolved after taking medrol dose pack prescribed 5/20/2015. Her pleuritic CP could be related to her RA. Then stopped tramadol as it blames it for recent episodes of CP.    In the past, MTX was recommended, she declined.    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 Etiology of mass unclear, but it's inflammatory and related to pt's underlying autoimmune disease (inflammatory arthritis, serositis). 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    Her work up showed borderline + ALLI and slightly elevated MPO. I told her prednisone is not good for her diabetes and weight gain. Recommended a trial of MTX as next step. Discussed risks on details. I called her neuro-ophthalmologist at last visit who agreed with trial of MTX (she suspects pseudo-sarcoidosis or sarcoid like disease but no granuloma and ACE not elevated).     Unfortunately despite all my efforts to explain risks vs benefits (more than risks) of MTX as steroid sparing gent, Janine declined to try MTX. I was very concerned about her R orbital inflammatory mass as there is high chance of flare up off steroids and steroid causes her weigh gain and uncontrolled diabetes. I even offered her other steroid sparing gents like imuran. She  declined that as well.    Her inflammation around R orbit has recurred now. On 4/7/2017, I spent quite amount of time discussing a trial of MTX. After discussing risks/benefits, she agreed to try it.     She is s/p Tx with MTX 10 mg po qwk 4/2017-5/2017. No benefits and more GI SEs, more hair loss and headaches since start of MTX. No benefits. I called and spoke with her neuro-ophthalmologist who recommended a second opinion from neuro-ophthalmology at the . I suspect her presentation to be ANCA vasculitis related but wanted to repeat imaging and get neuro-ophthalmology eval here. She was recommended to have repeat biopsy to r/o lymphoma.    In 5/2017, prescribed her prednisone 40-30-20-10 mg a day each for 3 days then stop (she declined higher dose and longer taper despite my concern for worsening swelling around orbit), Her R campos-orbital edema significantly improved. MTX was stopped in 5/2017 given side effects. Plan was to start imuran 50 mg po qd (NL TPMT); however we decided to hold off till she gets biopsy done.    Repeat R lacrimal gland biopsy in 8/2017 showed non specific inflammation, it ruled out lymphoma. Her R orbital swelling is improved but still present. After her biopsy I contacted her to schedule a f/u visit with me to discuss plan of care but she declined till today's visit.    She did not tolerate AZA because of GI SEs.    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     This is definitely an inflammatory process and is steroid responsive, she had local kenalog inj in 8/2017 at the time of biopsy which has helped. Given her diabetes and long term SE of prednisone, highly recommend steroid sparing agent to prevent progression/recurrence of R campos-orbital swelling. Since  she did not tolerate MTX and AZA, recommend rituximab as next step.     In 2/2018, I spent 30 minutes discussing test results, plan of care, rituximab SEs including rare risk of PML. She declined rituximab with concern for SEs.    Unfortunately lost f/u sine 2/2018. In 9/2018, was back with her R eye being much worse, swelling around R orbit was severe and is pushing down her eye. She decided to see an ophthalmologist at [place], was seen 8/29, was told to have GPA.    Janine is frustrated with her eye condition, saying nobody told her that she has GPA or Wegener's which is a serious condition and people could die from it.    I reminded her that I discussed the Dx of ANCA-vasculitis which is just another name for Wegener's with her many times but she always declined induction Tx rituximab at last visit in 9/2018. I put her on prednisone 30 mg every day in 9/2018, now she is off, stopped 6 wk after surgery. Does not like SEs including worsening BG.    CT chest/abdomen/pelvis 4/2017 was neg for malignancy. 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 Repeat MPO was positive in 6/2017.    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 Shah and Dr. Valdez at [place]. 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 Not happy with results, on my exam, her eye swelling/pain significantly improved. She wants to transfer care to here, I advised her to make an appointment with Dr. Saulo GREEN for f/u and referred her to Dr. Vasquez to assess if she has sinus involvement by GPA given facial pain.    After my original recommendation to receive rituximab (FDA approved for GPA) in 2/2018. She finally agreed to it, had 1 gr q2wk x 2 on 12/12/2018 and 12/26/2018. Had minor allergic reaction which was managed by extra dose of steroid and benadryl. She refuses to do prednisone taper given h/o diabetes, GIB on prednisone. I told her it would take 1 mo for rituximab to show benefit, if she continues to have active disease,  recommend trial of cytoxan.     Patient received Rituxan again (6/3/19 & 6/17/19) 6 months after first round. Repeat CT scan show continued inflammatory changes in the R orbit, but decreased proptosis. We discussed cytoxan again but she said it is out of question and she declined considering it. Therefore, will continue with q6mo rituximab with hope that inflammation goes down. Patient is not on any prednisone. Headaches have improved since surgery and rituximab treatment.  - Continue q6 month Rituximab infusions. Next due in December, 2019.  - Immunoglobulins, vasculitis labs, and rituximab monitoring labs ordered.    Continue accupuncture (referral was placed as it helps with chronic pain).     My impression is that her arthralgias are a combination of IA, fibromyalgia and chronic pain.  Stopped HCQ at last visit, shortly after resumed taking it as arthralgia recurred. Continue HCQ. Eye exam is updated.    2-Fad pads. She was seen by endocrinology and cushing was ruled out in 4/2014. Was advised to do f/u for enlarged thyroid/thyroid nodules.    3-Hair loss. F/U with dermatology    4-Chronic pain. F/U with pain clinic    5-Concerns of subclinical hyperthyroidism: TSH is barely minimal, chart review shows that those lowest levels are since April 2014. At last visit, discussed Endocrinology ref which pt wants to hold off in this visit.     6-Vit D deficiency. Was replaced with vit D 50, 000 units po qwk x 12 wk then 2000 units every day    7-Upper extremity swelling. Recent mammogram without suggestion of malignancy. No LAD of axillary region. Arms appear normal on physical exam, however patient reports intermittent swelling.  - Referral to lymphedema clinic.      PLAN: Follow up in 4-5 months.    MEDICATION CHANGES: none. Plan to repeat rituximab in 12/2019    Patient was seen and plan formulated with staff rheumatologist, Dr. Mckinnon.    Sadi Lee  Internal Medicine, PGY3      Attending Note: I saw and evaluated  the patient with Dr. Lee. I agree with the assessment and plan.    Majo Mckinnon MD      Orders Placed This Encounter   Procedures     CBC with platelets differential     CRP inflammation     Routine UA with Micro Reflex to Culture     Protein  random urine with Creat Ratio     Creatinine random urine     Erythrocyte sedimentation rate auto     ANCA IgG by IFA with Reflex to Titer     ANCA Vasculitis panel     Immunoglobulins A G and M     CD19 B Cell Count (B-lymphocyte antigen)     LYMPHEDEMA THERAPY REFERRAL

## 2021-08-20 ENCOUNTER — VIRTUAL VISIT (OUTPATIENT)
Dept: RHEUMATOLOGY | Facility: CLINIC | Age: 56
End: 2021-08-20
Attending: INTERNAL MEDICINE
Payer: COMMERCIAL

## 2021-08-20 DIAGNOSIS — M06.00 SERONEGATIVE RHEUMATOID ARTHRITIS (H): Primary | ICD-10-CM

## 2021-08-20 DIAGNOSIS — Z51.81 MEDICATION MONITORING ENCOUNTER: ICD-10-CM

## 2021-08-20 DIAGNOSIS — M31.30 GRANULOMATOSIS WITH POLYANGIITIS WITHOUT RENAL INVOLVEMENT (H): ICD-10-CM

## 2021-08-20 DIAGNOSIS — E55.9 VITAMIN D DEFICIENCY: ICD-10-CM

## 2021-08-20 PROCEDURE — 99214 OFFICE O/P EST MOD 30 MIN: CPT | Mod: 95 | Performed by: INTERNAL MEDICINE

## 2021-08-20 ASSESSMENT — PAIN SCALES - GENERAL: PAINLEVEL: MODERATE PAIN (5)

## 2021-08-20 NOTE — PROGRESS NOTES
Janine is a 56 year old who is being evaluated via a billable telephone visit.      What phone number would you like to be contacted at? [phone]  How would you like to obtain your AVS? Kenna  Phone call duration: 22 minutes    Rheumatology F/U Note    Last visit note: 5/10/2021    Today's visit date: 8/20/2021    Reason for in person visit: F/U GPA      HPI     Ms. 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 She is on HCQ since 5/2014. On rituximab since 12/20218 with great response. Previously tried and did not tolerate MTX, AZA.      She had lateral orbitotomy and debulking orbital mass right eye on 9/26/18 with Dr. Shah and Dr. Valdez at [place]. 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 There were no complications according to the op note.      4/21/2021: Had last rituximab 1 gram on 1/19, 2/2/2021. Reports rituximab starts to wear off earlier, has more sx this time. Eye swelling is intermittent. Gets indigestion/ GERD sx with constipation after the infusion.    She reports having asthma, swelling of neck, arms. She thinks that it could be from her vasculitis. She is worried about going down on rituximab dose.     Otherwise unchanged sx, no SOB or hemoptysis or persistent cough, or     Somedays her knees and hips hurt a lot, feel like bone on bone in her knees, especially on changing position.    5/10/2021:      Joint ache, knees hurt, R elbow hurts, R arm hurts, R hand hurts. Has lots of swelling in her joints especially hands.    Depending on weather, joints jhurt, sometimes pain is 7/10.    Has problem with taking stairs and balance.    Gets off balance.     R eye gets tinglin, swelling.    Gets out of breath, gets worse with seasonal allergies.    Over past month and half, took nitro more, like 8 times.    No hemooptysis, no hematuria.    Has allergies.      Today 8/20/2021: Janine  has pain over arms, knees. Some days, feel stiff all day long. Some days can't do stairs. Hard to tell if there is swelling as has more water retention during summer.    Some days, eye swells up like today. No fevers, SOB, CP or cough.    Has rosacea but some days wakes up with heat rash over sun. Her face is peeling.    Sometimes can't lift things or grab things with pain from elbow to hands. Has shoulder and neck pain but this forearm pain is new.    Stopped HCQ in mid July due to concern for potential HCQ toxicity on OCT, her eye exam with Dr. Vernon has been re-scheduled and upcoming on 9/14 to address HCQ toxicity, pain is not worse off HCQ.    Not COVID vaccinated but is cautious.    Component      Latest Ref Rng 2/28/2013 2/28/2013          12:14 PM 12:14 PM   WBC      4.0 - 11.0 10e9/L     RBC Count      3.8 - 5.2 10e12/L     Hemoglobin      11.7 - 15.7 g/dL     Hematocrit      35.0 - 47.0 %     MCV      78 - 100 fl     MCH      26.5 - 33.0 pg     MCHC      31.5 - 36.5 g/dL     RDW      10.0 - 15.0 %     Platelet Count      150 - 450 10e9/L     Specimen Description           Lyme Screen IgG and IgM           Vitamin D Deficiency screening      30 - 75 ug/L     Ferritin      10 - 300 ng/mL     Sed Rate      0 - 20 mm/h     ALLI Screen by EIA      <1.0     Rheumatoid Factor      0 - 14 IU/mL     CK Total      30 - 225 U/L  78   Uric Acid      2.5 - 7.5 mg/dL 6.7      Component      Latest Ref Rng 2/28/2013          12:14 PM   WBC      4.0 - 11.0 10e9/L    RBC Count      3.8 - 5.2 10e12/L    Hemoglobin      11.7 - 15.7 g/dL    Hematocrit      35.0 - 47.0 %    MCV      78 - 100 fl    MCH      26.5 - 33.0 pg    MCHC      31.5 - 36.5 g/dL    RDW      10.0 - 15.0 %    Platelet Count      150 - 450 10e9/L    Specimen Description       Serum   Lyme Screen IgG and IgM       Test value: <0.75....Interpretation: Negative....If you highly suspect Lyme . . .   Vitamin D Deficiency screening      30 - 75 ug/L    Ferritin       10 - 300 ng/mL    Sed Rate      0 - 20 mm/h    ALLI Screen by EIA      <1.0    Rheumatoid Factor      0 - 14 IU/mL    CK Total      30 - 225 U/L    Uric Acid      2.5 - 7.5 mg/dL      Component      Latest Ref Rng 2/28/2013 2/28/2013 2/28/2013 2/28/2013          12:14 PM 12:14 PM 12:14 PM 12:14 PM   WBC      4.0 - 11.0 10e9/L       RBC Count      3.8 - 5.2 10e12/L       Hemoglobin      11.7 - 15.7 g/dL       Hematocrit      35.0 - 47.0 %       MCV      78 - 100 fl       MCH      26.5 - 33.0 pg       MCHC      31.5 - 36.5 g/dL       RDW      10.0 - 15.0 %       Platelet Count      150 - 450 10e9/L       Specimen Description             Lyme Screen IgG and IgM             Vitamin D Deficiency screening      30 - 75 ug/L       Ferritin      10 - 300 ng/mL    10   Sed Rate      0 - 20 mm/h   23 (H)    ALLI Screen by EIA      <1.0  <1.0 . . .     Rheumatoid Factor      0 - 14 IU/mL 26 (H)      CK Total      30 - 225 U/L       Uric Acid      2.5 - 7.5 mg/dL         5/2013  CBC WITH PLATELETS DIFFERENTIAL       Component Value Range    WBC 11.3 (*) 4.0 - 11.0 10e9/L    RBC Count 4.56  3.8 - 5.2 10e12/L    Hemoglobin 11.1 (*) 11.7 - 15.7 g/dL    Hematocrit 34.3 (*) 35.0 - 47.0 %    MCV 75 (*) 78 - 100 fl    MCH 24.3 (*) 26.5 - 33.0 pg    MCHC 32.4  31.5 - 36.5 g/dL    RDW 16.1 (*) 10.0 - 15.0 %    Platelet Count 315  150 - 450 10e9/L    Diff Method Automated Method      % Neutrophils 71.6  40 - 75 %    % Lymphocytes 20.9  20 - 48 %    % Monocytes 4.3  0 - 12 %    % Eosinophils 2.8  0 - 6 %    % Basophils 0.2  0 - 2 %    % Immature Granulocytes 0.2  0 - 0.4 %    Absolute Neutrophil 8.1  1.6 - 8.3 10e9/L    Absolute Lymphocytes 2.4  0.8 - 5.3 10e9/L    Absolute Monoctyes 0.5  0.0 - 1.3 10e9/L    Absolute Eosinophils 0.3  0.0 - 0.7 10e9/L    Absolute Basophils 0.0  0.0 - 0.2 10e9/L    Abs Immature Granulocytes 0.0  0 - 0.03 10e9/L   AMYLASE       Component Value Range    Amylase 103  30 - 110 U/L   COMPREHENSIVE METABOLIC PANEL        Component Value Range    Sodium 144  133 - 144 mmol/L    Potassium 3.8  3.4 - 5.3 mmol/L    Chloride 105  94 - 109 mmol/L    Carbon Dioxide 23  20 - 32 mmol/L    Anion Gap 17  6 - 17 mmol/L    Glucose 173 (*) 60 - 99 mg/dL    Urea Nitrogen 13  5 - 24 mg/dL    Creatinine 0.83  0.52 - 1.04 mg/dL    GFR Estimate 74  >60 mL/min/1.7m2    GFR Estimate If Black 90  >60 mL/min/1.7m2    Calcium 9.7  8.5 - 10.4 mg/dL    Bilirubin Total 0.4  0.2 - 1.3 mg/dL    Albumin 4.3  3.9 - 5.1 g/dL    Protein Total 7.8  6.8 - 8.8 g/dL    Alkaline Phosphatase 66  40 - 150 U/L    ALT 36  0 - 50 U/L    AST 28  0 - 45 U/L   CK TOTAL       Component Value Range    CK Total 66  30 - 225 U/L   CRP INFLAMMATION       Component Value Range    CRP Inflammation 10.4 (*) 0.0 - 8.0 mg/L   LIPASE       Component Value Range    Lipase 353 (*) 20 - 250 U/L   ERYTHROCYTE SEDIMENTATION RATE AUTO       Component Value Range    Sed Rate 26 (*) 0 - 20 mm/h   ROUTINE UA WITH MICROSCOPIC REFLEX TO CULTURE       Component Value Range    Color Urine Yellow      Appearance Urine Slightly Cloudy      Glucose Urine 30 (*) NEG mg/dL    Bilirubin Urine Negative  NEG    Ketones Urine 5 (*) NEG mg/dL    Specific Gravity Urine 1.026  1.003 - 1.035    Blood Urine Trace (*) NEG    pH Urine 5.0  5.0 - 7.0 pH    Protein Albumin Urine 10 (*) NEG mg/dL    Urobilinogen mg/dL Normal  0.0 - 2.0 mg/dL    Nitrite Urine Negative  NEG    Leukocyte Esterase Urine Negative  NEG    Source Midstream Urine      WBC Urine 1  0 - 2 /HPF    RBC Urine 4 (*) 0 - 2 /HPF    Squamous Epithelial /HPF Urine <1  0 - 1 /HPF    Mucous Urine Present (*) NEG /LPF    Hyaline Casts 3 (*) 0 - 2 /LPF    Calcium Oxalate Moderate (*) NEG /HPF   COMPLEMENT C3       Component Value Range    Complement C3 143  76 - 169 mg/dL   COMPLEMENT C4       Component Value Range    Complement C4 31  15 - 50 mg/dL   HEPATITIS C ANTIBODY       Component Value Range    Hepatitis C Antibody Negative  NEG   CARDIOLIPIN  ANTIBODY IGG AND IGM       Component Value Range    Cardiolipin IgG Marline    0 - 15.0 GPL    Value: <15.0      Interpretation:  Negative    Cardiolipin IgM Marline    0 - 12.5 MPL    Value: <12.5      Interpretation:  Negative   LUPUS PANEL       Component Value Range    Lupus Result    NEG    Value: Negative      (Note)      COMMENTS:      The INR is normal.      APTT is normal.  1:2 Mix is not indicated.      DRVVT Screen is normal.      Thrombin time is normal.      NEGATIVE TEST; A LUPUS ANTICOAGULANT WAS NOT DETECTED IN THIS      SPECIMEN WITHIN THE LIMITS OF THE TESTING REPERTOIRE.      If the clinical picture is strongly suggestive of an antiphospholipid      syndrome, recommend anticardiolipin and beta-2-glycoprotein (IgG and      IgM) antibody tests.      Leela Franks M.D.  [phone]      5/2/2013            INR =  0.93 N = 0.86-1.14  (No additional charge)      TT = 15.7 N = 13.0-19.0 sec  (No additional charge)            APTT'S:    Seconds      Reagent =  Stago LA      Normal  =  38      Patient  =  34      1:2 Mix  =  N/A      Reference =  31-43             DILUTE MADELINE VIPER VENOM TEST:      DRVVT Screen Ratio = 0.76 Normal = <1.21         IMMUNOGLOBULIN G SUBCLASSES       Component Value Range    IGG 1030  695 - 1620 mg/dL    IgG1 488  300 - 856 mg/dL    IgG2 325  158 - 761 mg/dL    IgG3 47  24 - 192 mg/dL    IgG4 18  11 - 86 mg/dL   SUNNY ANTIBODY PANEL       Component Value Range    Ribonucleic Protein IgG Antibody 0      Smith Antibody IgG 1      SSA (RO) Antibody IgG 4      SSB (LA) Antibody IgG 0      Scleroderma Antibody IgG 0     BETA 2 GLYCOPROTEIN ANTIBODIES IGG IGM       Component Value Range    Beta-2-Glycopro IgG 1      Beta-2-Glycopro IgM 5     CYCLIC CIT PEPT IGG       Component Value Range    Cyclic Cit Pept IgG/IgA    <20 UNITS    Value: <20      Interpretation:  Negative   DNA DOUBLE STRANDED ANTIBODIES       Component Value Range    DNA-ds    0 - 29 IU/mL    Value: <15       Interpretation:  Negative       CT CHEST PULMONARY EMBOLISM W CONTRAST 5/20/2015 4:57 PM  HISTORY: Pain. SOB. Elevated d-dimer.   TECHNIQUE: 65 mL Isovue 370. Axial images with coronal  reconstructions.  COMPARISON: None.  FINDINGS: Calcified granuloma with surrounding scarring in the  posterolateral left upper lobe. Triangular shaped opacity at the right  lung base in the lateral right middle lobe could represent some  scarring, atelectasis or infiltrate. There is also some scarring or  atelectasis in the posteromedial right lung base. Lungs otherwise  clear.  The pulmonary arteries are well opacified. No CT evidence for acute  pulmonary embolus. No aortic dissection.  Diffuse fatty infiltration of the liver.  IMPRESSION  IMPRESSION:   1. No pulmonary embolus identified.  2. Small focus of atelectasis, infiltrate or scarring in the lateral  right middle lobe.  3. Old granulomatous disease.  4. Otherwise negative.  SILVERIO VAZQUEZ MD    Copath Report      Patient Name: FAVIO MARTINEZ   MR#: [id number]   Specimen #: [id number]   Collected: 3/15/2016   Received: 3/15/2016   Reported: 3/17/2016 13:33   Ordering Phy(s): PARVEEN ENRIQUEZ     ORIGINAL REPORT FOLLOWS ADDENDUM  ADDENDUM     TO ORIGINAL REPORT   Status: Signed Out   Date Ordered:3/18/2016   Date Complete:3/18/2016   Date Reported:3/18/2016 12:06   Signed Out By: Marianne Godfrey MD     INTERPRETATION:   This addendum is done to incorporate the results of fungal (GMS) stains   done on the specimen.  Specimen is negative for fungal organisms.  The   original final diagnosis remains unchanged.     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  Negative for malignancy(Please   see microscopic description)     Electronically signed out by:  "    Marianne Godfrey MD     CLINICAL HISTORY:   right orbital mass     GROSS:   The specimen is received labeled \"right orbital biopsy\" and consists of   red-tan nodule measuring 1.5 x 0.9 x 0.6 cm with one smooth side and   opposite rough side consistent with periosteum.  The specimen is   bisected revealing homogenous pale tan fleshy cut surface.  Touch   preparations are prepared, air dried and fixed, portion of specimen is   submitted in RPMI for possible lymphoma workup [facility],   ([facility] [employer], [place], [place] ).  The remainder is entirely submitted.   (Dictated by: Marianne Godfrey MD 3/15/2016 04:45 PM)     MICROSCOPIC:     Specimen consists of portion of lacrimal gland with acute and chronic   inflammation.  Focal area of microabscess formation associated with   small areas of necrosis are also present.  Inflammation is seen   extending to the periorbital adipose tissue forming panniculitis.   Specimen is negative for malignancy.  Samples sent for immunophenotyping   to [facility], ([facility], [place], [place] ) reveals no evidence   of monoclonality or aberrant antigen expression.  A GMS (fungal) stain   is pending and results will be reported as an addendum.     CPT Codes:   A: 01593-BX0, 60167-EGWUK, SOH, 02380-FDONI, 92383-UBLA     TESTING LAB LOCATION:   [address]  [address] [phone]     COLLECTION SITE:   [facility]   [facility]            Component      Latest Ref Rng 4/29/2016   Nucleated RBCs      0 /100 0   Absolute Neutrophil      1.6 - 8.3 10e9/L 8.9 (H)   Absolute Lymphocytes      0.8 - 5.3 10e9/L 3.2   Absolute Monocytes      0.0 - 1.3 10e9/L 0.8   Absolute Eosinophils      0.0 - 0.7 10e9/L 0.2   Absolute Basophils      0.0 - 0.2 10e9/L 0.1   Abs Immature Granulocytes      0 - 0.4 10e9/L 0.1   Absolute Nucleated RBC       0.0   IGG      695 - 1620 mg/dL 836   IgG1      300 - 856 mg/dL " 280 (L)   IgG2      158 - 761 mg/dL 277   IgG3      24 - 192 mg/dL 35   IgG4      11 - 86 mg/dL 16   RNP Antibody IgG      0.0 - 0.9 AI <0.2 . . .   Smith SUNNY Antibody IgG      0.0 - 0.9 AI <0.2 . . .   SSA (Ro) (SUNNY) Antibody, IgG      0.0 - 0.9 AI <0.2 . . .   SSB (La) (SUNNY) Antibody, IgG      0.0 - 0.9 AI <0.2 . . .   Scleroderma Antibody Scl-70 SUNNY IgG      0.0 - 0.9 AI <0.2 . . .   Cholesterol      <200 mg/dL 154   Triglycerides      <150 mg/dL 220 (H)   HDL Cholesterol      >49 mg/dL 42 (L)   LDL Cholesterol Calculated      <100 mg/dL 67   Non HDL Cholesterol      <130 mg/dL 111   M Tuberculosis Result      NEG Negative   M Tuberculosis Antigen Value       0.00   Albumin      3.4 - 5.0 g/dL 4.3   ALT      0 - 50 U/L 30   AST      0 - 45 U/L 10   Complement C3      76 - 169 mg/dL 157   Complement C4      15 - 50 mg/dL 32   CRP Inflammation      0.0 - 8.0 mg/L <2.9   Sed Rate      0 - 20 mm/h 2   DNA-ds      <10 IU/mL 1   Cyclic Citrullinated Peptide Antibody, IgG      <7 U/mL 1   Rheumatoid Factor      <20 IU/mL <20   Proteinase 3 Antibody IgG      0.0 - 0.9 AI <0.2 . . .   Myeloperoxidase Antibody IgG      0.0 - 0.9 AI 2.5 (H)   Vitamin D Deficiency screening      20 - 75 ug/L 24   Hemoglobin A1C      4.3 - 6.0 % 7.9 (H)   ALLI by IFA IgG       1:40 (A) . . .     Component      Latest Ref Rng & Units 1/16/2021   WBC      4.0 - 11.0 10e9/L    RBC Count      3.8 - 5.2 10e12/L    Hemoglobin      11.7 - 15.7 g/dL    Hematocrit      35.0 - 47.0 %    MCV      78 - 100 fl    MCH      26.5 - 33.0 pg    MCHC      31.5 - 36.5 g/dL    RDW      10.0 - 15.0 %    Platelet Count      150 - 450 10e9/L    Diff Method          % Neutrophils      %    % Lymphocytes      %    % Monocytes      %    % Eosinophils      %    % Basophils      %    % Immature Granulocytes      %    Nucleated RBCs      0 /100    Absolute Neutrophil      1.6 - 8.3 10e9/L    Absolute Lymphocytes      0.8 - 5.3 10e9/L    Absolute Monocytes      0.0 - 1.3  10e9/L    Absolute Eosinophils      0.0 - 0.7 10e9/L    Absolute Basophils      0.0 - 0.2 10e9/L    Abs Immature Granulocytes      0 - 0.4 10e9/L    Absolute Nucleated RBC          IGG      610 - 1,616 mg/dL    IGA      84 - 499 mg/dL    IGM      35 - 242 mg/dL    Creatinine      0.52 - 1.04 mg/dL    GFR Estimate      >60 mL/min/1.73:m2    GFR Estimate If Black      >60 mL/min/1.73:m2    COVID-19 Virus PCR to [facility] - Source       Nasopharyngeal   COVID-19 Virus PCR to [facility] - Result       Not Detected   Neutrophil Cytoplasmic Antibody      <1:10 titer    Neutrophil Cytoplasmic Antibody Pattern          CD19 B Cells      6 - 27 %    Absolute CD19      107 - 698 cells/uL    Myeloperoxidase Antibody IgG      0.0 - 0.9 AI    Proteinase 3 Antibody IgG      0.0 - 0.9 AI    Vitamin D Deficiency screening      20 - 75 ug/L    Sed Rate      0 - 30 mm/h    CRP Inflammation      0.0 - 8.0 mg/L    Albumin      3.4 - 5.0 g/dL    ALT      0 - 50 U/L    AST      0 - 45 U/L      Component      Latest Ref Rng & Units 5/7/2021   WBC      4.0 - 11.0 10e9/L 8.9   RBC Count      3.8 - 5.2 10e12/L 4.89   Hemoglobin      11.7 - 15.7 g/dL 12.7   Hematocrit      35.0 - 47.0 % 39.7   MCV      78 - 100 fl 81   MCH      26.5 - 33.0 pg 26.0 (L)   MCHC      31.5 - 36.5 g/dL 32.0   RDW      10.0 - 15.0 % 13.7   Platelet Count      150 - 450 10e9/L 336   Diff Method       Automated Method   % Neutrophils      % 65.3   % Lymphocytes      % 20.7   % Monocytes      % 7.8   % Eosinophils      % 5.3   % Basophils      % 0.7   % Immature Granulocytes      % 0.2   Nucleated RBCs      0 /100 0   Absolute Neutrophil      1.6 - 8.3 10e9/L 5.8   Absolute Lymphocytes      0.8 - 5.3 10e9/L 1.8   Absolute Monocytes      0.0 - 1.3 10e9/L 0.7   Absolute Eosinophils      0.0 - 0.7 10e9/L 0.5   Absolute Basophils      0.0 - 0.2 10e9/L 0.1   Abs Immature Granulocytes      0 - 0.4 10e9/L 0.0   Absolute Nucleated RBC       0.0   IGG      610 - 1,616 mg/dL 649    IGA      84 - 499 mg/dL 199   IGM      35 - 242 mg/dL 36   Creatinine      0.52 - 1.04 mg/dL 0.96   GFR Estimate      >60 mL/min/1.73:m2 66   GFR Estimate If Black      >60 mL/min/1.73:m2 77   COVID-19 Virus PCR to [facility] - Source          COVID-19 Virus PCR to [facility] - Result          Neutrophil Cytoplasmic Antibody      <1:10 titer <1:10   Neutrophil Cytoplasmic Antibody Pattern       The ANCA IFA is <1:10.  No further testing will be performed.   CD19 B Cells      6 - 27 % <1 (L)   Absolute CD19      107 - 698 cells/uL <1 (L)   Myeloperoxidase Antibody IgG      0.0 - 0.9 AI 1.1 (H)   Proteinase 3 Antibody IgG      0.0 - 0.9 AI <0.2   Vitamin D Deficiency screening      20 - 75 ug/L 41   Sed Rate      0 - 30 mm/h 9   CRP Inflammation      0.0 - 8.0 mg/L <2.9   Albumin      3.4 - 5.0 g/dL 4.1   ALT      0 - 50 U/L 28   AST      0 - 45 U/L 18     ROS: A comprehensive ROS was done. Positives are per HPI.          HISTORY REVIEW:  Past Medical History:   Diagnosis Date     Abnormal glandular Papanicolaou smear of cervix 1992     Allergic rhinitis     Allergy, airborne subst     Arthritis      ASCVD (arteriosclerotic cardiovascular disease)      Chronic pain      Chronic pancreatitis (H)     idiopathic, Tx: PPI, antioxidants, pancreatic enzymes     Common migraine      Coronary artery disease      Costochondritis      Difficulty in walking(719.7)      Dyspnea on exertion      Ectasia, mammary duct     [facility] nipple discharge     Elevated fasting glucose      Gastro-oesophageal reflux disease      Granulomatosis with polyangiitis (H)      Hernia      History of angina      Hyperlipidemia LDL goal < 100      Hypertension goal BP (blood pressure) < 140/90     Essential hypertension     Iron deficiency anemia      Ischemic cardiomyopathy      Menorrhagia      Migraine headaches      Mild persistent asthma      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 Matias, neg w/u, Rx'd w/prednisone     Ventral hernia, unspecified, without mention of obstruction or gangrene 2012     Past Surgical History:   Procedure Laterality Date     C ECHO HEART XTHORACIC,COMPLETE, W/O DOPPLER  04    [facility] WNL, EF 60%     C/SECTION, LOW TRANSVERSE           CARDIAC SURGERY      cardiac stent- recent in 16; 4 other stents     DILATION AND CURETTAGE N/A 2016    Procedure: DILATION AND CURETTAGE;  Surgeon: Nahed Butler MD;  Location: [facility] OR     HC UGI ENDOSCOPY W EUS  08    Dr. Pastrana, possible chronic pancreatitis, fatty liver     HERNIA REPAIR  2012    done at [facility]     INSERT INTRAUTERINE DEVICE N/A 2016    Procedure: INSERT INTRAUTERINE DEVICE;  Surgeon: Nahed Butler MD;  Location: [facility] OR     INT UTERINE FIBRIOD EMBOLIZATION  10/29/2014     LAPAROSCOPIC CHOLECYSTECTOMY  08    Dr. Arnol GRUBBS TX, CERVICAL      s/p LEEP     ORBITOTOMY Right 3/15/2016    Procedure: ORBITOTOMY;  Surgeon: Myron Cyr MD;  Location: [facility]     ORBITOTOMY Right 2017    Procedure: ORBITOTOMY;  RIGHT ORBITOTOMY AND BIOPSY;  Surgeon: Charis Holbrook MD;  Location: [facility]     REPAIR PTOSIS Right 2017    Procedure: REPAIR PTOSIS;  RIGHT UPPER LID PTOSIS REPAIR;  Surgeon: Myron Cyr MD;  Location: [facility]      UPPER GI ENDOSCOPY  10/21/08    mild gastritis, Dr. Hidalgo     Family History   Problem Relation Age of Onset     Heart Disease Father 50        heart attack     Cerebrovascular Disease Father      Diabetes Father      Hypertension Father      Depression Father      C.A.D. Father      Hypertension Mother      Arthritis Mother      Heart Disease Mother         long qt syndrome     Thyroid Disease Mother      C.A.D. Mother      Heart Disease Brother 15        MI at 15, 16.      Diabetes Maternal Uncle      Hypertension Maternal Aunt      Hypertension Maternal Uncle      Arthritis Brother         he passed away, had severe arthritis at age 11     Arthritis Maternal Uncle      Eye Disorder Maternal Uncle         cataracts     Neurologic Disorder Sister         migraines     Neurologic Disorder Sister         migraines     Respiratory Son         asthma     Cerebrovascular Disease Maternal Uncle      C.A.D. Brother      Family History Negative Other         neg for RA, SLE     Unknown/Adopted No family hx of         unknown neurological issues in her family, mother was adopted     Skin Cancer No family hx of         No known family hx of skin cancer     Social History     Socioeconomic History     Marital status: Single     Spouse name: Not on file     Number of children: 1     Years of education: Not on file     Highest education level: Not on file   Occupational History     Employer: NONE    Tobacco Use     Smoking status: Current Every Day Smoker     Packs/day: 0.20     Years: 1.00     Pack years: 0.20     Types: Cigarettes     Last attempt to quit: 2016     Years since quittin.5     Smokeless tobacco: Never Used   Substance and Sexual Activity     Alcohol use: No     Alcohol/week: 0.0 standard drinks     Drug use: No     Sexual activity: Not Currently   Other Topics Concern     Parent/sibling w/ CABG, MI or angioplasty before 65F 55M? Yes   Social History Narrative    Balanced Diet - sometimes     Osteoporosis Prevention Measures - Dairy servings per day: 2 servings weekly    Regular Exercise -  Yes Describe walking 4 times a week    Dental Exam - NO    Seatbelts used - Yes    Self Breast Exam - Yes    Abuse: Current or Past (Physical, Sexual or Emotional)- No    Do you have any concerns about STD's -  No    Do you feel safe in your environment - No    Guns stored in the home - No    Sunscreen used - Yes    Lipids -  YES - Date: 053102    Glucose -  YES - Date: 012804    Eye Exam - YES - Date: one year ago    Colon Cancer Screening - No    Hemoccults - NO    Pap Test -  YES - Date: 070904, remote history of LEEP    Mammography - YES - Date: last spring, would like to discuss, needs a referral to [facility] [place]    DEXA - NO    Immunizations reviewed and up to date - Yes, last td given in 1997 or 1998     Social Determinants of Health     Financial Resource Strain:      Difficulty of Paying Living Expenses:    Food Insecurity:      Worried About Running Out of Food in the Last Year:      Ran Out of Food in the Last Year:    Transportation Needs:      Lack of Transportation (Medical):      Lack of Transportation (Non-Medical):    Physical Activity:      Days of Exercise per Week:      Minutes of Exercise per Session:    Stress:      Feeling of Stress :    Social Connections:      Frequency of Communication with Friends and Family:      Frequency of Social Gatherings with Friends and Family:      Attends Sabianism Services:      Active Member of Clubs or Organizations:      Attends Club or Organization Meetings:      Marital Status:    Intimate Partner Violence:      Fear of Current or Ex-Partner:      Emotionally Abused:      Physically Abused:      Sexually Abused:      Patient Active Problem List   Diagnosis     Seasonal affective disorder (H)     Allergic rhinitis     PCOS (polycystic ovarian syndrome)     Moderate persistent asthma     Chronic pancreatitis (H)     Hypertension goal BP (blood  pressure) < 140/90     Common migraine     NSTEMI (non-ST elevated myocardial infarction) (H)     Hyperlipidemia LDL goal <70     ASCVD (arteriosclerotic cardiovascular disease)     GERD (gastroesophageal reflux disease)     Ischemic cardiomyopathy     Hypertensive heart disease     Uterine leiomyoma     Iron deficiency anemia     Costochondritis     Vitamin D deficiency     Breast cancer screening     Spinal stenosis in cervical region     Fibromyalgia     Seronegative rheumatoid arthritis (H)     Type 2 diabetes, HbA1C goal < 8% (H)     Type 2 diabetes mellitus with other specified complication (H)     Hyperlipidemia associated with type 2 diabetes mellitus (H)     Hypertension associated with diabetes (H)     Overweight with body mass index (BMI) 25.0-29.9     Tobacco use disorder     Telogen effluvium     CAD S/P percutaneous coronary angioplasty     Status post coronary angiogram     ANCA-associated vasculitis (H)     Wegener's vasculitis (H)     Type 1 diabetes mellitus with complications (H)     Chest discomfort     Urinary frequency     Abdominal pain, epigastric     Hypokalemia     Leukocytosis, unspecified type     Acute pancreatitis, unspecified complication status, unspecified pancreatitis type       Pregnancy Hx: She is . All misscarriages were in first trimester. H/o OC use in the past. 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    PMHx, FHx, SHx were reviewed, unchanged.      Outpatient Encounter Medications as of 2021   Medication Sig Dispense Refill     acetaminophen (TYLENOL) 325 MG tablet Take 1-2 tablets (325-650 mg) by mouth every 6 hours as needed for pain (headache) 250 tablet 4     albuterol (2.5 MG/3ML) 0.083% neb solution INL 1 VIAL VIA NEBULIZATION Q 4 TO 6 HOURS PRN  1     albuterol (PROAIR HFA, PROVENTIL HFA, VENTOLIN HFA) 108 (90 BASE) MCG/ACT inhaler Inhale 2 puffs into the lungs every 6 hours as needed for shortness of breath / dyspnea or wheezing 3 Inhaler 1      aspirin (ASPIRIN LOW DOSE) 81 MG EC tablet Take 1 tablet (81 mg) by mouth daily . 30-day refills only. 30 tablet 11     blood glucose monitoring (NO BRAND SPECIFIED) meter device kit Use to test blood sugar 4 X times daily or as directed. (Patient taking differently: 1 kit Use to test blood sugar 4 X times daily or as directed.) 1 kit 0     blood glucose monitoring (NO BRAND SPECIFIED) test strip 1 strip by In Vitro route 4 times daily Test as directed. Please dispense three months and three months of refills. Thank you. (Patient taking differently: 1 strip by In Vitro route 4 times daily Test as directed. Please dispense three months and three months of refills. Thank you.) 360 each 3     Blood Pressure Monitor KIT 1 each daily Monitor home blood pressure as instructed by physician.  Dispense [place] blood pressure kit. 1 kit 0     calcium carbonate (TUMS) 500 MG chewable tablet Take 3-4 tablets (1,500-2,000 mg) by mouth daily as needed 90 tablet 3     clopidogrel (PLAVIX) 75 MG tablet Take 1 tablet (75 mg) by mouth daily . 30-day refills only. 30 tablet 11     COMPRESSION STOCKINGS 2 each daily Apply one 10-15 mmHg compression stocking to each lower extgmierty during the day and remove before bedtime. 4 each 2     cyclobenzaprine (FLEXERIL) 10 MG tablet Take 1 tablet (10 mg) by mouth 2 times daily as needed for muscle spasms 60 tablet 3     dicyclomine (BENTYL) 20 MG tablet TAKE 1 TABLET(20 MG) BY MOUTH FOUR TIMES DAILY AS NEEDED. Pls schedule clinic appt for refills. 60 tablet 0     diphenhydrAMINE (BENADRYL) 25 MG capsule Take 1 capsule (25 mg) by mouth nightly as needed for itching or allergies 30 capsule 2     EPIPEN 2-RIKY 0.3 MG/0.3ML injection INJECT 0.3 MG INTO THE MUSCLE PRF ANAPHYALAXIS  0     famotidine (PEPCID) 20 MG tablet Take 1 tablet (20 mg) by mouth 2 times daily as needed (abdominal pain) 20 tablet 0     fexofenadine (ALLEGRA) 180 MG tablet Take 1 tablet by mouth daily as needed. 90 tablet 3      fluocinolone (SYNALAR) 0.01 % solution Apply topically daily as needed       fluocinonide (LIDEX) 0.05 % external ointment Apply topically as needed       fluticasone-vilanterol (BREO ELLIPTA) 200-25 MCG/INH inhaler Inhale 1 puff into the lungs daily       folic acid (FOLVITE) 1 MG tablet Take 1 tablet by mouth daily 90 tablet 3     hydroxychloroquine (PLAQUENIL) 200 MG tablet Take 2 tablets (400 mg) by mouth daily (Annual eye exam/plaquenil screening) 180 tablet 0     insulin glargine (LANTUS PEN) 100 UNIT/ML pen Inject 58 Units Subcutaneous every morning       insulin pen needle (BD MARCK U/F) 32G X 4 MM USE DAILY OR AS DIRECTED 300 each 3     ketoconazole (NIZORAL) 2 % shampoo Apply topically daily as needed       lisinopril-hydrochlorothiazide (ZESTORETIC) 20-25 MG tablet Take 1 tablet by mouth daily . 30-day refills only. 30 tablet 11     Magnesium Oxide -Mg Supplement 250 MG tablet Take 1 tablet (250 mg) by mouth 2 times daily (Patient taking differently: Take 250 mg by mouth daily ) 60 tablet 3     metFORMIN (GLUCOPHAGE-XR) 500 MG 24 hr tablet Take 2,000 mg by mouth daily (with dinner)       metoprolol succinate ER (TOPROL-XL) 100 MG 24 hr tablet Take 1 tablet (100 mg) by mouth daily . 30-day refills only. 30 tablet 11     montelukast (SINGULAIR) 10 MG tablet Take 1 tablet (10 mg) by mouth At Bedtime 30 tablet 1     Multiple Vitamin (DAILY-GERALD) TABS Take 1 tablet by mouth daily 90 tablet 0     nitroGLYcerin (NITROSTAT) 0.4 MG sublingual tablet Place 1 tablet (0.4 mg) under the tongue every 5 minutes as needed for chest pain . After 3 doses, if pain persists call 911. 25 tablet 3     ondansetron (ZOFRAN-ODT) 4 MG ODT tab Take 1 tablet (4 mg) by mouth every 8 hours as needed for nausea 12 tablet 0     polyethylene glycol (MIRALAX) 17 GM/Dose powder Take 17 g by mouth daily 225 g 0     pravastatin (PRAVACHOL) 40 MG tablet Take 1 tablet (40 mg) by mouth daily . 30-day refills only. 30 tablet 11     sennosides  (SENOKOT) 8.6 MG tablet 1-2 tabs a day as needed for constipation 60 tablet 1     spironolactone (ALDACTONE) 25 MG tablet Take 1 tablet (25 mg) by mouth daily . Take one additional 0.5 tab daily as needed for weight gain. 30-day refills only. 30 tablet 11     traMADol (ULTRAM) 50 MG tablet Take 1 tablet (50 mg) by mouth every 8 hours as needed for moderate pain 60 tablet 3     vitamin D2 (ERGOCALCIFEROL) 36868 units (1250 mcg) capsule Take 1 capsule (50,000 Units) by mouth every 7 days 12 capsule 0     vitamin D3 (CHOLECALCIFEROL) 50 mcg (2000 units) tablet Take 1 tablet (50 mcg) by mouth daily 90 tablet 1     No facility-administered encounter medications on file as of 8/20/2021.       Allergies   Allergen Reactions     Amoxicillin-Pot Clavulanate      Augmentin      Unknown possible hives and edema     Azithromycin      Diatrizoate Other (See Comments)     Pt wants this listed because she is allergic to shellfish      Imitrex [Sumatriptan]      Severe face/neck/chest tightness and flushing side effects      Penicillins Hives     Unknown      Pork Allergy      Stomach pain, cramping, diarrhea, itching, nausea and headaches     Shellfish Allergy Hives and Swelling     Sulfa Drugs Hives and Swelling     Zithromax [Azithromycin Dihydrate] Swelling     Unknown          Ph.E:    Not done    Assessment/ plan:    1-Seropositive non-erosive RA (arthritis, AM stiffness, high CRP, RF 26 but re-check neg 3/2015 on HCQ) Dx 5/2013, FMS, new pleuritic CP 3/20-15-5/2015 resolved on steroids. She is on  mg bid since 5/2014. She tolerates it well and it's helping some but not enough to control all her pain. Continues having flare ups of joint pain, could be GPA related. Some pain is due to FMS.    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 Prednisone  was not a good option for her given weight gain, diabetes. She tried and did not tolerate MTX, AZA.    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 Repeat MPO was positive in 6/2017.    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 Shah and Dr. Valdez at [place]. 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     She is on rituximab since 12/12/2018 with great response, minor allergic reaction which could be managed by pre-meds and extra steroid dose.      Stable GPA but rituximab does not last 6 months. Has lots of joint pain, eye swells up from time to time, orbit CT and labs not done yet. According to ACR guideline could give every 4-6 months. Stable 5/2021 labs.     Had last rituximab 1 gram one dose only on 6/16/2021.      My impression is that her arthralgias are a combination of RA/ IA sec GPA, fibromyalgia and chronic pain.  Stopped HCQ, reports no worsening pain off HCQ.    2-Fad pads. She was seen by endocrinology and cushing was ruled out in 4/2014. Was advised to do f/u for enlarged thyroid/thyroid nodules.    3-Hair loss. F/U with dermatology    4-Chronic pain. F/U with pain clinic    5-Vit D deficiency. Was replaced with vit D 50, 000 units po qwk x 12 wk then 2000 units every day    6-Discussed COVID vaccination, timing with rituximab to get max benefit,s he will consider.    7-?HCQ toxicity on OCT 7/2021, now off HCQ, has upcoming eye exam with dr. Vernon on 9/14.      Today's plan:    Labs, orbit CT on 9/14/2021 on the same day of eye exam with Dr. Vernon, will coordinate then she could do all in one trip.    Covid pfizer vaccine in mid September 2021    Rituximab 1 gram in mid October 2021    Return in 3-4 months (in person)      Majo Mckinnon MD

## 2021-08-20 NOTE — PATIENT INSTRUCTIONS
Labs, orbit CT on 9/14/2021    Covid pfizer vaccine in mid September 2021    Rituximab 1 gram in mid October 2021    Return in 3-4 months (in person)

## 2021-08-20 NOTE — LETTER
8/20/2021       RE: Janine Cornell  [address] [facility] [address]     Dear Colleague,    Thank you for referring your patient, Janine Cornell, to the [facility] [place] at [facility]. Please see a copy of my visit note below.    Janine is a 56 year old who is being evaluated via a billable telephone visit.      What phone number would you like to be contacted at? [phone]  How would you like to obtain your AVS? MyChart  Phone call duration: 22 minutes    Rheumatology F/U Note    Last visit note: 5/10/2021    Today's visit date: 8/20/2021    Reason for in person visit: F/U GPA      HPI     Ms. 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 She is on HCQ since 5/2014. On rituximab since 12/20218 with great response. Previously tried and did not tolerate MTX, AZA.      She had lateral orbitotomy and debulking orbital mass right eye on 9/26/18 with Dr. Shah and Dr. Valdez at [place]. 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 There were no complications according to the op note.      4/21/2021: Had last rituximab 1 gram on 1/19, 2/2/2021. Reports rituximab starts to wear off earlier, has more sx this time. Eye swelling is intermittent. Gets indigestion/ GERD sx with constipation after the infusion.    She reports having asthma, swelling of neck, arms. She thinks that it could be from her vasculitis. She is worried about going down on rituximab dose.     Otherwise unchanged sx, no SOB or hemoptysis or persistent cough, or     Somedays her knees and hips hurt a lot, feel like bone on bone in her knees, especially on changing position.    5/10/2021:      Joint ache, knees hurt, R elbow hurts, R arm hurts, R hand hurts. Has lots of swelling in her joints especially hands.    Depending on weather, joints jhurt, sometimes  pain is 7/10.    Has problem with taking stairs and balance.    Gets off balance.     R eye gets tinglin, swelling.    Gets out of breath, gets worse with seasonal allergies.    Over past month and half, took nitro more, like 8 times.    No hemooptysis, no hematuria.    Has allergies.      Today 8/20/2021: Janine has pain over arms, knees. Some days, feel stiff all day long. Some days can't do stairs. Hard to tell if there is swelling as has more water retention during summer.    Some days, eye swells up like today. No fevers, SOB, CP or cough.    Has rosacea but some days wakes up with heat rash over sun. Her face is peeling.    Sometimes can't lift things or grab things with pain from elbow to hands. Has shoulder and neck pain but this forearm pain is new.    Stopped HCQ in mid July due to concern for potential HCQ toxicity on OCT, her eye exam with Dr. Vernon has been re-scheduled and upcoming on 9/14 to address HCQ toxicity, pain is not worse off HCQ.    Not COVID vaccinated but is cautious.    Component      Latest Ref Rng 2/28/2013 2/28/2013          12:14 PM 12:14 PM   WBC      4.0 - 11.0 10e9/L     RBC Count      3.8 - 5.2 10e12/L     Hemoglobin      11.7 - 15.7 g/dL     Hematocrit      35.0 - 47.0 %     MCV      78 - 100 fl     MCH      26.5 - 33.0 pg     MCHC      31.5 - 36.5 g/dL     RDW      10.0 - 15.0 %     Platelet Count      150 - 450 10e9/L     Specimen Description           Lyme Screen IgG and IgM           Vitamin D Deficiency screening      30 - 75 ug/L     Ferritin      10 - 300 ng/mL     Sed Rate      0 - 20 mm/h     ALLI Screen by EIA      <1.0     Rheumatoid Factor      0 - 14 IU/mL     CK Total      30 - 225 U/L  78   Uric Acid      2.5 - 7.5 mg/dL 6.7      Component      Latest Ref Rng 2/28/2013          12:14 PM   WBC      4.0 - 11.0 10e9/L    RBC Count      3.8 - 5.2 10e12/L    Hemoglobin      11.7 - 15.7 g/dL    Hematocrit      35.0 - 47.0 %    MCV      78 - 100 fl    MCH      26.5 - 33.0 pg     MCHC      31.5 - 36.5 g/dL    RDW      10.0 - 15.0 %    Platelet Count      150 - 450 10e9/L    Specimen Description       Serum   Lyme Screen IgG and IgM       Test value: <0.75....Interpretation: Negative....If you highly suspect Lyme . . .   Vitamin D Deficiency screening      30 - 75 ug/L    Ferritin      10 - 300 ng/mL    Sed Rate      0 - 20 mm/h    ALLI Screen by EIA      <1.0    Rheumatoid Factor      0 - 14 IU/mL    CK Total      30 - 225 U/L    Uric Acid      2.5 - 7.5 mg/dL      Component      Latest Ref Rng 2/28/2013 2/28/2013 2/28/2013 2/28/2013          12:14 PM 12:14 PM 12:14 PM 12:14 PM   WBC      4.0 - 11.0 10e9/L       RBC Count      3.8 - 5.2 10e12/L       Hemoglobin      11.7 - 15.7 g/dL       Hematocrit      35.0 - 47.0 %       MCV      78 - 100 fl       MCH      26.5 - 33.0 pg       MCHC      31.5 - 36.5 g/dL       RDW      10.0 - 15.0 %       Platelet Count      150 - 450 10e9/L       Specimen Description             Lyme Screen IgG and IgM             Vitamin D Deficiency screening      30 - 75 ug/L       Ferritin      10 - 300 ng/mL    10   Sed Rate      0 - 20 mm/h   23 (H)    ALLI Screen by EIA      <1.0  <1.0 . . .     Rheumatoid Factor      0 - 14 IU/mL 26 (H)      CK Total      30 - 225 U/L       Uric Acid      2.5 - 7.5 mg/dL         5/2013  CBC WITH PLATELETS DIFFERENTIAL       Component Value Range    WBC 11.3 (*) 4.0 - 11.0 10e9/L    RBC Count 4.56  3.8 - 5.2 10e12/L    Hemoglobin 11.1 (*) 11.7 - 15.7 g/dL    Hematocrit 34.3 (*) 35.0 - 47.0 %    MCV 75 (*) 78 - 100 fl    MCH 24.3 (*) 26.5 - 33.0 pg    MCHC 32.4  31.5 - 36.5 g/dL    RDW 16.1 (*) 10.0 - 15.0 %    Platelet Count 315  150 - 450 10e9/L    Diff Method Automated Method      % Neutrophils 71.6  40 - 75 %    % Lymphocytes 20.9  20 - 48 %    % Monocytes 4.3  0 - 12 %    % Eosinophils 2.8  0 - 6 %    % Basophils 0.2  0 - 2 %    % Immature Granulocytes 0.2  0 - 0.4 %    Absolute Neutrophil 8.1  1.6 - 8.3 10e9/L    Absolute  Lymphocytes 2.4  0.8 - 5.3 10e9/L    Absolute Monoctyes 0.5  0.0 - 1.3 10e9/L    Absolute Eosinophils 0.3  0.0 - 0.7 10e9/L    Absolute Basophils 0.0  0.0 - 0.2 10e9/L    Abs Immature Granulocytes 0.0  0 - 0.03 10e9/L   AMYLASE       Component Value Range    Amylase 103  30 - 110 U/L   COMPREHENSIVE METABOLIC PANEL       Component Value Range    Sodium 144  133 - 144 mmol/L    Potassium 3.8  3.4 - 5.3 mmol/L    Chloride 105  94 - 109 mmol/L    Carbon Dioxide 23  20 - 32 mmol/L    Anion Gap 17  6 - 17 mmol/L    Glucose 173 (*) 60 - 99 mg/dL    Urea Nitrogen 13  5 - 24 mg/dL    Creatinine 0.83  0.52 - 1.04 mg/dL    GFR Estimate 74  >60 mL/min/1.7m2    GFR Estimate If Black 90  >60 mL/min/1.7m2    Calcium 9.7  8.5 - 10.4 mg/dL    Bilirubin Total 0.4  0.2 - 1.3 mg/dL    Albumin 4.3  3.9 - 5.1 g/dL    Protein Total 7.8  6.8 - 8.8 g/dL    Alkaline Phosphatase 66  40 - 150 U/L    ALT 36  0 - 50 U/L    AST 28  0 - 45 U/L   CK TOTAL       Component Value Range    CK Total 66  30 - 225 U/L   CRP INFLAMMATION       Component Value Range    CRP Inflammation 10.4 (*) 0.0 - 8.0 mg/L   LIPASE       Component Value Range    Lipase 353 (*) 20 - 250 U/L   ERYTHROCYTE SEDIMENTATION RATE AUTO       Component Value Range    Sed Rate 26 (*) 0 - 20 mm/h   ROUTINE UA WITH MICROSCOPIC REFLEX TO CULTURE       Component Value Range    Color Urine Yellow      Appearance Urine Slightly Cloudy      Glucose Urine 30 (*) NEG mg/dL    Bilirubin Urine Negative  NEG    Ketones Urine 5 (*) NEG mg/dL    Specific Gravity Urine 1.026  1.003 - 1.035    Blood Urine Trace (*) NEG    pH Urine 5.0  5.0 - 7.0 pH    Protein Albumin Urine 10 (*) NEG mg/dL    Urobilinogen mg/dL Normal  0.0 - 2.0 mg/dL    Nitrite Urine Negative  NEG    Leukocyte Esterase Urine Negative  NEG    Source Midstream Urine      WBC Urine 1  0 - 2 /HPF    RBC Urine 4 (*) 0 - 2 /HPF    Squamous Epithelial /HPF Urine <1  0 - 1 /HPF    Mucous Urine Present (*) NEG /LPF    Hyaline Casts 3 (*)  0 - 2 /LPF    Calcium Oxalate Moderate (*) NEG /HPF   COMPLEMENT C3       Component Value Range    Complement C3 143  76 - 169 mg/dL   COMPLEMENT C4       Component Value Range    Complement C4 31  15 - 50 mg/dL   HEPATITIS C ANTIBODY       Component Value Range    Hepatitis C Antibody Negative  NEG   CARDIOLIPIN ANTIBODY IGG AND IGM       Component Value Range    Cardiolipin IgG Marline    0 - 15.0 GPL    Value: <15.0      Interpretation:  Negative    Cardiolipin IgM Marline    0 - 12.5 MPL    Value: <12.5      Interpretation:  Negative   LUPUS PANEL       Component Value Range    Lupus Result    NEG    Value: Negative      (Note)      COMMENTS:      The INR is normal.      APTT is normal.  1:2 Mix is not indicated.      DRVVT Screen is normal.      Thrombin time is normal.      NEGATIVE TEST; A LUPUS ANTICOAGULANT WAS NOT DETECTED IN THIS      SPECIMEN WITHIN THE LIMITS OF THE TESTING REPERTOIRE.      If the clinical picture is strongly suggestive of an antiphospholipid      syndrome, recommend anticardiolipin and beta-2-glycoprotein (IgG and      IgM) antibody tests.      Leela Franks M.D.  [phone]      5/2/2013            INR =  0.93 N = 0.86-1.14  (No additional charge)      TT = 15.7 N = 13.0-19.0 sec  (No additional charge)            APTT'S:    Seconds      Reagent =  Stago LA      Normal  =  38      Patient  =  34      1:2 Mix  =  N/A      Reference =  31-43             DILUTE MADELINE VIPER VENOM TEST:      DRVVT Screen Ratio = 0.76 Normal = <1.21         IMMUNOGLOBULIN G SUBCLASSES       Component Value Range    IGG 1030  695 - 1620 mg/dL    IgG1 488  300 - 856 mg/dL    IgG2 325  158 - 761 mg/dL    IgG3 47  24 - 192 mg/dL    IgG4 18  11 - 86 mg/dL   SUNNY ANTIBODY PANEL       Component Value Range    Ribonucleic Protein IgG Antibody 0      Smith Antibody IgG 1      SSA (RO) Antibody IgG 4      SSB (LA) Antibody IgG 0      Scleroderma Antibody IgG 0     BETA 2 GLYCOPROTEIN ANTIBODIES IGG IGM        Component Value Range    Beta-2-Glycopro IgG 1      Beta-2-Glycopro IgM 5     CYCLIC CIT PEPT IGG       Component Value Range    Cyclic Cit Pept IgG/IgA    <20 UNITS    Value: <20      Interpretation:  Negative   DNA DOUBLE STRANDED ANTIBODIES       Component Value Range    DNA-ds    0 - 29 IU/mL    Value: <15      Interpretation:  Negative       CT CHEST PULMONARY EMBOLISM W CONTRAST 5/20/2015 4:57 PM  HISTORY: Pain. SOB. Elevated d-dimer.   TECHNIQUE: 65 mL Isovue 370. Axial images with coronal  reconstructions.  COMPARISON: None.  FINDINGS: Calcified granuloma with surrounding scarring in the  posterolateral left upper lobe. Triangular shaped opacity at the right  lung base in the lateral right middle lobe could represent some  scarring, atelectasis or infiltrate. There is also some scarring or  atelectasis in the posteromedial right lung base. Lungs otherwise  clear.  The pulmonary arteries are well opacified. No CT evidence for acute  pulmonary embolus. No aortic dissection.  Diffuse fatty infiltration of the liver.  IMPRESSION  IMPRESSION:   1. No pulmonary embolus identified.  2. Small focus of atelectasis, infiltrate or scarring in the lateral  right middle lobe.  3. Old granulomatous disease.  4. Otherwise negative.  SILVERIO VAZQUEZ MD    Copath Report      Patient Name: FAVIO MARTINEZ   MR#: [id number]   Specimen #: [id number]   Collected: 3/15/2016   Received: 3/15/2016   Reported: 3/17/2016 13:33   Ordering Phy(s): PARVEEN ENRIQUEZ     ORIGINAL REPORT FOLLOWS ADDENDUM  ADDENDUM     TO ORIGINAL REPORT   Status: Signed Out   Date Ordered:3/18/2016   Date Complete:3/18/2016   Date Reported:3/18/2016 12:06   Signed Out By: Marianne Godfrey MD     INTERPRETATION:   This addendum is done to incorporate the results of fungal (GMS) stains   done on the specimen.  Specimen is negative for fungal organisms.  The   original final diagnosis remains unchanged.     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  Negative for malignancy(Please   see microscopic description)     Electronically signed out by:     Marianne Godfrey MD     CLINICAL HISTORY:   right orbital mass     GROSS:   The specimen is received labeled \"right orbital biopsy\" and consists of   red-tan nodule measuring 1.5 x 0.9 x 0.6 cm with one smooth side and   opposite rough side consistent with periosteum.  The specimen is   bisected revealing homogenous pale tan fleshy cut surface.  Touch   preparations are prepared, air dried and fixed, portion of specimen is   submitted in RPMI for possible lymphoma workup [facility],   ([facility], [place], [place] ).  The remainder is entirely submitted.   (Dictated by: Marianne Godfrey MD 3/15/2016 04:45 PM)     MICROSCOPIC:     Specimen consists of portion of lacrimal gland with acute and chronic   inflammation.  Focal area of microabscess formation associated with   small areas of necrosis are also present.  Inflammation is seen   extending to the periorbital adipose tissue forming panniculitis.   Specimen is negative for malignancy.  Samples sent for immunophenotyping   to [facility], ([facility], [place], [place] ) reveals no evidence   of monoclonality or aberrant antigen expression.  A GMS (fungal) stain   is pending and results will be reported as an addendum.     CPT Codes:   A: 78218-FI8, 60731-VPKTM, SOH, 52287-XVTJO, 95548-BAUM     TESTING LAB LOCATION:   [address]  [address] [phone]     COLLECTION SITE:   Client: [facility]   Location: [facility] (S)            Component      Latest Ref Rng 4/29/2016   Nucleated RBCs      0 /100 0   Absolute Neutrophil      1.6 - 8.3 10e9/L 8.9 (H)   Absolute " Lymphocytes      0.8 - 5.3 10e9/L 3.2   Absolute Monocytes      0.0 - 1.3 10e9/L 0.8   Absolute Eosinophils      0.0 - 0.7 10e9/L 0.2   Absolute Basophils      0.0 - 0.2 10e9/L 0.1   Abs Immature Granulocytes      0 - 0.4 10e9/L 0.1   Absolute Nucleated RBC       0.0   IGG      695 - 1620 mg/dL 836   IgG1      300 - 856 mg/dL 280 (L)   IgG2      158 - 761 mg/dL 277   IgG3      24 - 192 mg/dL 35   IgG4      11 - 86 mg/dL 16   RNP Antibody IgG      0.0 - 0.9 AI <0.2 . . .   Smith SUNNY Antibody IgG      0.0 - 0.9 AI <0.2 . . .   SSA (Ro) (SUNNY) Antibody, IgG      0.0 - 0.9 AI <0.2 . . .   SSB (La) (SUNNY) Antibody, IgG      0.0 - 0.9 AI <0.2 . . .   Scleroderma Antibody Scl-70 SUNNY IgG      0.0 - 0.9 AI <0.2 . . .   Cholesterol      <200 mg/dL 154   Triglycerides      <150 mg/dL 220 (H)   HDL Cholesterol      >49 mg/dL 42 (L)   LDL Cholesterol Calculated      <100 mg/dL 67   Non HDL Cholesterol      <130 mg/dL 111   M Tuberculosis Result      NEG Negative   M Tuberculosis Antigen Value       0.00   Albumin      3.4 - 5.0 g/dL 4.3   ALT      0 - 50 U/L 30   AST      0 - 45 U/L 10   Complement C3      76 - 169 mg/dL 157   Complement C4      15 - 50 mg/dL 32   CRP Inflammation      0.0 - 8.0 mg/L <2.9   Sed Rate      0 - 20 mm/h 2   DNA-ds      <10 IU/mL 1   Cyclic Citrullinated Peptide Antibody, IgG      <7 U/mL 1   Rheumatoid Factor      <20 IU/mL <20   Proteinase 3 Antibody IgG      0.0 - 0.9 AI <0.2 . . .   Myeloperoxidase Antibody IgG      0.0 - 0.9 AI 2.5 (H)   Vitamin D Deficiency screening      20 - 75 ug/L 24   Hemoglobin A1C      4.3 - 6.0 % 7.9 (H)   ALLI by IFA IgG       1:40 (A) . . .     Component      Latest Ref Rng & Units 1/16/2021   WBC      4.0 - 11.0 10e9/L    RBC Count      3.8 - 5.2 10e12/L    Hemoglobin      11.7 - 15.7 g/dL    Hematocrit      35.0 - 47.0 %    MCV      78 - 100 fl    MCH      26.5 - 33.0 pg    MCHC      31.5 - 36.5 g/dL    RDW      10.0 - 15.0 %    Platelet Count      150 - 450 10e9/L     Diff Method          % Neutrophils      %    % Lymphocytes      %    % Monocytes      %    % Eosinophils      %    % Basophils      %    % Immature Granulocytes      %    Nucleated RBCs      0 /100    Absolute Neutrophil      1.6 - 8.3 10e9/L    Absolute Lymphocytes      0.8 - 5.3 10e9/L    Absolute Monocytes      0.0 - 1.3 10e9/L    Absolute Eosinophils      0.0 - 0.7 10e9/L    Absolute Basophils      0.0 - 0.2 10e9/L    Abs Immature Granulocytes      0 - 0.4 10e9/L    Absolute Nucleated RBC          IGG      610 - 1,616 mg/dL    IGA      84 - 499 mg/dL    IGM      35 - 242 mg/dL    Creatinine      0.52 - 1.04 mg/dL    GFR Estimate      >60 mL/min/1.73:m2    GFR Estimate If Black      >60 mL/min/1.73:m2    COVID-19 Virus PCR to [facility] - Source       Nasopharyngeal   COVID-19 Virus PCR to [facility] - Result       Not Detected   Neutrophil Cytoplasmic Antibody      <1:10 titer    Neutrophil Cytoplasmic Antibody Pattern          CD19 B Cells      6 - 27 %    Absolute CD19      107 - 698 cells/uL    Myeloperoxidase Antibody IgG      0.0 - 0.9 AI    Proteinase 3 Antibody IgG      0.0 - 0.9 AI    Vitamin D Deficiency screening      20 - 75 ug/L    Sed Rate      0 - 30 mm/h    CRP Inflammation      0.0 - 8.0 mg/L    Albumin      3.4 - 5.0 g/dL    ALT      0 - 50 U/L    AST      0 - 45 U/L      Component      Latest Ref Rng & Units 5/7/2021   WBC      4.0 - 11.0 10e9/L 8.9   RBC Count      3.8 - 5.2 10e12/L 4.89   Hemoglobin      11.7 - 15.7 g/dL 12.7   Hematocrit      35.0 - 47.0 % 39.7   MCV      78 - 100 fl 81   MCH      26.5 - 33.0 pg 26.0 (L)   MCHC      31.5 - 36.5 g/dL 32.0   RDW      10.0 - 15.0 % 13.7   Platelet Count      150 - 450 10e9/L 336   Diff Method       Automated Method   % Neutrophils      % 65.3   % Lymphocytes      % 20.7   % Monocytes      % 7.8   % Eosinophils      % 5.3   % Basophils      % 0.7   % Immature Granulocytes      % 0.2   Nucleated RBCs      0 /100 0   Absolute Neutrophil      1.6 - 8.3  10e9/L 5.8   Absolute Lymphocytes      0.8 - 5.3 10e9/L 1.8   Absolute Monocytes      0.0 - 1.3 10e9/L 0.7   Absolute Eosinophils      0.0 - 0.7 10e9/L 0.5   Absolute Basophils      0.0 - 0.2 10e9/L 0.1   Abs Immature Granulocytes      0 - 0.4 10e9/L 0.0   Absolute Nucleated RBC       0.0   IGG      610 - 1,616 mg/dL 649   IGA      84 - 499 mg/dL 199   IGM      35 - 242 mg/dL 36   Creatinine      0.52 - 1.04 mg/dL 0.96   GFR Estimate      >60 mL/min/1.73:m2 66   GFR Estimate If Black      >60 mL/min/1.73:m2 77   COVID-19 Virus PCR to [facility] - Source          COVID-19 Virus PCR to [facility] - Result          Neutrophil Cytoplasmic Antibody      <1:10 titer <1:10   Neutrophil Cytoplasmic Antibody Pattern       The ANCA IFA is <1:10.  No further testing will be performed.   CD19 B Cells      6 - 27 % <1 (L)   Absolute CD19      107 - 698 cells/uL <1 (L)   Myeloperoxidase Antibody IgG      0.0 - 0.9 AI 1.1 (H)   Proteinase 3 Antibody IgG      0.0 - 0.9 AI <0.2   Vitamin D Deficiency screening      20 - 75 ug/L 41   Sed Rate      0 - 30 mm/h 9   CRP Inflammation      0.0 - 8.0 mg/L <2.9   Albumin      3.4 - 5.0 g/dL 4.1   ALT      0 - 50 U/L 28   AST      0 - 45 U/L 18     ROS: A comprehensive ROS was done. Positives are per HPI.          HISTORY REVIEW:  Past Medical History:   Diagnosis Date     Abnormal glandular Papanicolaou smear of cervix 1992     Allergic rhinitis     Allergy, airborne subst     Arthritis      ASCVD (arteriosclerotic cardiovascular disease)      Chronic pain      Chronic pancreatitis (H)     idiopathic, Tx: PPI, antioxidants, pancreatic enzymes     Common migraine      Coronary artery disease      Costochondritis      Difficulty in walking(719.7)      Dyspnea on exertion      Ectasia, mammary duct     [facility], persistent nipple discharge     Elevated fasting glucose      Gastro-oesophageal reflux disease      Granulomatosis with polyangiitis (H)      Hernia      History of angina       Hyperlipidemia LDL goal < 100      Hypertension goal BP (blood pressure) < 140/90     Essential hypertension     Iron deficiency anemia      Ischemic cardiomyopathy      Menorrhagia      Migraine headaches      Mild persistent asthma      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 Matias, neg w/u, Rx'd w/prednisone     Ventral hernia, unspecified, without mention of obstruction or gangrene 2012     Past Surgical History:   Procedure Laterality Date     C ECHO HEART XTHORACIC,COMPLETE, W/O DOPPLER  04    [facility] WNL, EF 60%     C/SECTION, LOW TRANSVERSE           CARDIAC SURGERY      cardiac stent- recent in 16; 4 other stents     DILATION AND CURETTAGE N/A 2016    Procedure: DILATION AND CURETTAGE;  Surgeon: Nahed Butler MD;  Location: [facility]     HC UGI ENDOSCOPY W EUS  08    Dr. Pastrana, possible chronic pancreatitis, fatty liver     HERNIA REPAIR  2012    done at [facility]     INSERT INTRAUTERINE DEVICE N/A 2016    Procedure: INSERT INTRAUTERINE DEVICE;  Surgeon: Nahed Butler MD;  Location: [facility] OR     INT UTERINE FIBRIOD EMBOLIZATION  10/29/2014     LAPAROSCOPIC CHOLECYSTECTOMY  08    Dr. Arnol GRUBBS TX, CERVICAL      s/p LEEP     ORBITOTOMY Right 3/15/2016    Procedure: ORBITOTOMY;  Surgeon: Ger  Myron Montalvo MD;  Location: [facility]     ORBITOTOMY Right 2017    Procedure: ORBITOTOMY;  RIGHT ORBITOTOMY AND BIOPSY;  Surgeon: Charis Holbrook MD;  Location: [facility]     REPAIR PTOSIS Right 2017    Procedure: REPAIR PTOSIS;  RIGHT UPPER LID PTOSIS REPAIR;  Surgeon: Myron Cyr MD;  Location[facility]     UPPER GI ENDOSCOPY  10/21/08    mild gastritis, Dr. Hidalgo     Family History   Problem Relation Age of Onset     Heart Disease Father 50        heart attack     Cerebrovascular Disease Father      Diabetes Father      Hypertension Father      Depression Father      C.A.D. Father      Hypertension Mother      Arthritis Mother      Heart Disease Mother         long qt syndrome     Thyroid Disease Mother      C.A.D. Mother      Heart Disease Brother 15        MI at 15, 16.      Diabetes Maternal Uncle      Hypertension Maternal Aunt      Hypertension Maternal Uncle      Arthritis Brother         he passed away, had severe arthritis at age 11     Arthritis Maternal Uncle      Eye Disorder Maternal Uncle         cataracts     Neurologic Disorder Sister         migraines     Neurologic Disorder Sister         migraines     Respiratory Son         asthma     Cerebrovascular Disease Maternal Uncle      C.A.D. Brother      Family History Negative Other         neg for RA, SLE     Unknown/Adopted No family hx of         unknown neurological issues in her family, mother was adopted     Skin Cancer No family hx of         No known family hx of skin cancer     Social History     Socioeconomic History     Marital status: Single     Spouse name: Not on file     Number of children: 1     Years of education: Not on file     Highest education level: Not on file   Occupational History     Employer: NONE    Tobacco Use     Smoking status: Current Every Day Smoker     Packs/day: 0.20     Years: 1.00     Pack years: 0.20     Types: Cigarettes     Last attempt to quit: 2016     Years since quittin.5      Smokeless tobacco: Never Used   Substance and Sexual Activity     Alcohol use: No     Alcohol/week: 0.0 standard drinks     Drug use: No     Sexual activity: Not Currently   Other Topics Concern     Parent/sibling w/ CABG, MI or angioplasty before 65F 55M? Yes   Social History Narrative    Balanced Diet - sometimes    Osteoporosis Prevention Measures - Dairy servings per day: 2 servings weekly    Regular Exercise -  Yes Describe walking 4 times a week    Dental Exam - NO    Seatbelts used - Yes    Self Breast Exam - Yes    Abuse: Current or Past (Physical, Sexual or Emotional)- No    Do you have any concerns about STD's -  No    Do you feel safe in your environment - No    Guns stored in the home - No    Sunscreen used - Yes    Lipids -  YES - Date: 053102    Glucose -  YES - Date: 012804    Eye Exam - YES - Date: one year ago    Colon Cancer Screening - No    Hemoccults - NO    Pap Test -  YES - Date: 070904, remote history of LEEP    Mammography - YES - Date: last spring, would like to discuss, needs a referral to [facility] center    DEXA - NO    Immunizations reviewed and up to date - Yes, last td given in 1997 or 1998     Social Determinants of Health     Financial Resource Strain:      Difficulty of Paying Living Expenses:    Food Insecurity:      Worried About Running Out of Food in the Last Year:      Ran Out of Food in the Last Year:    Transportation Needs:      Lack of Transportation (Medical):      Lack of Transportation (Non-Medical):    Physical Activity:      Days of Exercise per Week:      Minutes of Exercise per Session:    Stress:      Feeling of Stress :    Social Connections:      Frequency of Communication with Friends and Family:      Frequency of Social Gatherings with Friends and Family:      Attends [employer] witness Services:      Active Member of Clubs or Organizations:      Attends Club or Organization Meetings:      Marital Status:    Intimate Partner Violence:      Fear of Current or  Ex-Partner:      Emotionally Abused:      Physically Abused:      Sexually Abused:      Patient Active Problem List   Diagnosis     Seasonal affective disorder (H)     Allergic rhinitis     PCOS (polycystic ovarian syndrome)     Moderate persistent asthma     Chronic pancreatitis (H)     Hypertension goal BP (blood pressure) < 140/90     Common migraine     NSTEMI (non-ST elevated myocardial infarction) (H)     Hyperlipidemia LDL goal <70     ASCVD (arteriosclerotic cardiovascular disease)     GERD (gastroesophageal reflux disease)     Ischemic cardiomyopathy     Hypertensive heart disease     Uterine leiomyoma     Iron deficiency anemia     Costochondritis     Vitamin D deficiency     Breast cancer screening     Spinal stenosis in cervical region     Fibromyalgia     Seronegative rheumatoid arthritis (H)     Type 2 diabetes, HbA1C goal < 8% (H)     Type 2 diabetes mellitus with other specified complication (H)     Hyperlipidemia associated with type 2 diabetes mellitus (H)     Hypertension associated with diabetes (H)     Overweight with body mass index (BMI) 25.0-29.9     Tobacco use disorder     Telogen effluvium     CAD S/P percutaneous coronary angioplasty     Status post coronary angiogram     ANCA-associated vasculitis (H)     Wegener's vasculitis (H)     Type 1 diabetes mellitus with complications (H)     Chest discomfort     Urinary frequency     Abdominal pain, epigastric     Hypokalemia     Leukocytosis, unspecified type     Acute pancreatitis, unspecified complication status, unspecified pancreatitis type       Pregnancy Hx: She is . All misscarriages were in first trimester. H/o OC use in the past. 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    PMHx, FHx, SHx were reviewed, unchanged.      Outpatient Encounter Medications as of 2021   Medication Sig Dispense Refill     acetaminophen (TYLENOL) 325 MG tablet Take 1-2 tablets (325-650 mg) by mouth every 6 hours as needed for pain  (headache) 250 tablet 4     albuterol (2.5 MG/3ML) 0.083% neb solution INL 1 VIAL VIA NEBULIZATION Q 4 TO 6 HOURS PRN  1     albuterol (PROAIR HFA, PROVENTIL HFA, VENTOLIN HFA) 108 (90 BASE) MCG/ACT inhaler Inhale 2 puffs into the lungs every 6 hours as needed for shortness of breath / dyspnea or wheezing 3 Inhaler 1     aspirin (ASPIRIN LOW DOSE) 81 MG EC tablet Take 1 tablet (81 mg) by mouth daily . 30-day refills only. 30 tablet 11     blood glucose monitoring (NO BRAND SPECIFIED) meter device kit Use to test blood sugar 4 X times daily or as directed. (Patient taking differently: 1 kit Use to test blood sugar 4 X times daily or as directed.) 1 kit 0     blood glucose monitoring (NO BRAND SPECIFIED) test strip 1 strip by In Vitro route 4 times daily Test as directed. Please dispense three months and three months of refills. Thank you. (Patient taking differently: 1 strip by In Vitro route 4 times daily Test as directed. Please dispense three months and three months of refills. Thank you.) 360 each 3     Blood Pressure Monitor KIT 1 each daily Monitor home blood pressure as instructed by physician.  Dispense [place] blood pressure kit. 1 kit 0     calcium carbonate (TUMS) 500 MG chewable tablet Take 3-4 tablets (1,500-2,000 mg) by mouth daily as needed 90 tablet 3     clopidogrel (PLAVIX) 75 MG tablet Take 1 tablet (75 mg) by mouth daily . 30-day refills only. 30 tablet 11     COMPRESSION STOCKINGS 2 each daily Apply one 10-15 mmHg compression stocking to each lower extgmierty during the day and remove before bedtime. 4 each 2     cyclobenzaprine (FLEXERIL) 10 MG tablet Take 1 tablet (10 mg) by mouth 2 times daily as needed for muscle spasms 60 tablet 3     dicyclomine (BENTYL) 20 MG tablet TAKE 1 TABLET(20 MG) BY MOUTH FOUR TIMES DAILY AS NEEDED. Pls schedule clinic appt for refills. 60 tablet 0     diphenhydrAMINE (BENADRYL) 25 MG capsule Take 1 capsule (25 mg) by mouth nightly as needed for itching or allergies  30 capsule 2     EPIPEN 2-RIKY 0.3 MG/0.3ML injection INJECT 0.3 MG INTO THE MUSCLE PRF ANAPHYALAXIS  0     famotidine (PEPCID) 20 MG tablet Take 1 tablet (20 mg) by mouth 2 times daily as needed (abdominal pain) 20 tablet 0     fexofenadine (ALLEGRA) 180 MG tablet Take 1 tablet by mouth daily as needed. 90 tablet 3     fluocinolone (SYNALAR) 0.01 % solution Apply topically daily as needed       fluocinonide (LIDEX) 0.05 % external ointment Apply topically as needed       fluticasone-vilanterol (BREO ELLIPTA) 200-25 MCG/INH inhaler Inhale 1 puff into the lungs daily       folic acid (FOLVITE) 1 MG tablet Take 1 tablet by mouth daily 90 tablet 3     hydroxychloroquine (PLAQUENIL) 200 MG tablet Take 2 tablets (400 mg) by mouth daily (Annual eye exam/plaquenil screening) 180 tablet 0     insulin glargine (LANTUS PEN) 100 UNIT/ML pen Inject 58 Units Subcutaneous every morning       insulin pen needle (BD MARCK U/F) 32G X 4 MM USE DAILY OR AS DIRECTED 300 each 3     ketoconazole (NIZORAL) 2 % shampoo Apply topically daily as needed       lisinopril-hydrochlorothiazide (ZESTORETIC) 20-25 MG tablet Take 1 tablet by mouth daily . 30-day refills only. 30 tablet 11     Magnesium Oxide -Mg Supplement 250 MG tablet Take 1 tablet (250 mg) by mouth 2 times daily (Patient taking differently: Take 250 mg by mouth daily ) 60 tablet 3     metFORMIN (GLUCOPHAGE-XR) 500 MG 24 hr tablet Take 2,000 mg by mouth daily (with dinner)       metoprolol succinate ER (TOPROL-XL) 100 MG 24 hr tablet Take 1 tablet (100 mg) by mouth daily . 30-day refills only. 30 tablet 11     montelukast (SINGULAIR) 10 MG tablet Take 1 tablet (10 mg) by mouth At Bedtime 30 tablet 1     Multiple Vitamin (DAILY-GERALD) TABS Take 1 tablet by mouth daily 90 tablet 0     nitroGLYcerin (NITROSTAT) 0.4 MG sublingual tablet Place 1 tablet (0.4 mg) under the tongue every 5 minutes as needed for chest pain . After 3 doses, if pain persists call 911. 25 tablet 3     ondansetron  (ZOFRAN-ODT) 4 MG ODT tab Take 1 tablet (4 mg) by mouth every 8 hours as needed for nausea 12 tablet 0     polyethylene glycol (MIRALAX) 17 GM/Dose powder Take 17 g by mouth daily 225 g 0     pravastatin (PRAVACHOL) 40 MG tablet Take 1 tablet (40 mg) by mouth daily . 30-day refills only. 30 tablet 11     sennosides (SENOKOT) 8.6 MG tablet 1-2 tabs a day as needed for constipation 60 tablet 1     spironolactone (ALDACTONE) 25 MG tablet Take 1 tablet (25 mg) by mouth daily . Take one additional 0.5 tab daily as needed for weight gain. 30-day refills only. 30 tablet 11     traMADol (ULTRAM) 50 MG tablet Take 1 tablet (50 mg) by mouth every 8 hours as needed for moderate pain 60 tablet 3     vitamin D2 (ERGOCALCIFEROL) 40970 units (1250 mcg) capsule Take 1 capsule (50,000 Units) by mouth every 7 days 12 capsule 0     vitamin D3 (CHOLECALCIFEROL) 50 mcg (2000 units) tablet Take 1 tablet (50 mcg) by mouth daily 90 tablet 1     No facility-administered encounter medications on file as of 8/20/2021.       Allergies   Allergen Reactions     Amoxicillin-Pot Clavulanate      Augmentin      Unknown possible hives and edema     Azithromycin      Diatrizoate Other (See Comments)     Pt wants this listed because she is allergic to shellfish      Imitrex [Sumatriptan]      Severe face/neck/chest tightness and flushing side effects      Penicillins Hives     Unknown      Pork Allergy      Stomach pain, cramping, diarrhea, itching, nausea and headaches     Shellfish Allergy Hives and Swelling     Sulfa Drugs Hives and Swelling     Zithromax [Azithromycin Dihydrate] Swelling     Unknown          Ph.E:    Not done    Assessment/ plan:    1-Seropositive non-erosive RA (arthritis, AM stiffness, high CRP, RF 26 but re-check neg 3/2015 on HCQ) Dx 5/2013, FMS, new pleuritic CP 3/20-15-5/2015 resolved on steroids. She is on  mg bid since 5/2014. She tolerates it well and it's helping some but not enough to control all her pain.  Continues having flare ups of joint pain, could be GPA related. Some pain is due to FMS.    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 Prednisone was not a good option for her given weight gain, diabetes. She tried and did not tolerate MTX, AZA.    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 Repeat MPO was positive in 6/2017.    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 Shah and Dr. Valdez at [facility]. 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     She is on rituximab since 12/12/2018 with great response, minor allergic reaction which could be managed by pre-meds and extra steroid dose.      Stable GPA but rituximab does not last 6 months. Has lots of joint pain, eye swells up from time to time, orbit CT and labs not done yet. According to ACR guideline could give every 4-6 months. Stable 5/2021 labs.     Had last rituximab 1 gram one dose only on 6/16/2021.      My impression is that her arthralgias are a combination of RA/ IA sec GPA, fibromyalgia and chronic pain.  Stopped HCQ, reports no worsening pain off HCQ.    2-Fad pads. She was seen by endocrinology and cushing was ruled out in 4/2014. Was advised to do f/u for enlarged thyroid/thyroid nodules.    3-Hair loss. F/U with dermatology    4-Chronic pain. F/U with pain clinic    5-Vit D deficiency. Was replaced with vit D 50, 000 units po qwk x 12 wk then 2000 units every day    6-Discussed COVID vaccination, timing with rituximab to get max benefit,s he will consider.    7-?HCQ toxicity on OCT 7/2021, now off HCQ, has upcoming eye exam with dr. Vernon on 9/14.      Today's plan:    Labs, orbit CT on 9/14/2021 on the same day of eye exam with Dr. Vernon, will coordinate then she could do all in one trip.    Covid pfizer vaccine in mid September 2021    Rituximab  1 gram in mid October 2021    Return in 3-4 months (in person)      Majo Mckinnon MD

## 2021-08-29 ENCOUNTER — HEALTH MAINTENANCE LETTER (OUTPATIENT)
Age: 56
End: 2021-08-29

## 2021-09-02 NOTE — NURSING NOTE
"No chief complaint on file.      Initial /75  Pulse 91  Temp 98.7  F (37.1  C) (Oral)  Ht 1.6 m (5' 3\")  Wt 74.4 kg (164 lb)  LMP  (LMP Unknown)  BMI 29.05 kg/m2 Estimated body mass index is 29.05 kg/(m^2) as calculated from the following:    Height as of this encounter: 1.6 m (5' 3\").    Weight as of this encounter: 74.4 kg (164 lb).  Medication Reconciliation: complete   Angela Cain, UZAIR     " [address]        NAME: Puneet Perez is a 6 m o  male  : 2019    MRN: [id number]  DATE: 2021  TIME: 6:20 PM    Assessment and Plan   Fever, unspecified fever cause [R50 9]  1  Fever, unspecified fever cause  acetaminophen (TYLENOL) rectal suppository 120 mg   2  Other non-recurrent acute nonsuppurative otitis media of right ear  amoxicillin (AMOXIL) 400 MG/5ML suspension   3  Acute pharyngitis, unspecified etiology           Patient Instructions     Patient Instructions   Right ear and throat showed bright redness  Will prescribe Amoxicillin x7 days  Can give probiotic with the antibiotic  Give tylenol and motrin for the fever  Increase fluids  Can return to  after 24 hours fever free with out any help from tylenol or motrin  Follow up with pediatrician as needed  Proceed to ER if symptoms worsen        Follow up with PCP in 3-5 days  Proceed to  ER if symptoms worsen  Chief Complaint     Chief Complaint   Patient presents with    Fever         History of Present Illness       5 mo old male presents with mom for a fever that started this afternoon at   Mom was called and informed he had a temp of 101 and had to come be picked up and evaluated  Per mom pt was "a little cranky" this morning but not warm or ill appearing  There is no associated cough, diarrhea or vomiting  No known sick contacts, or COVID exposures  Pt has been eating and drinking normally  UTD on vaccines  Review of Systems   Review of Systems   Unable to perform ROS: Age   Constitutional: Positive for crying and fever  Negative for appetite change  HENT: Negative for congestion and sneezing  Respiratory: Negative for cough  Gastrointestinal: Negative for diarrhea and vomiting           Current Medications       Current Outpatient Medications:     amoxicillin (AMOXIL) 400 MG/5ML suspension, Take 5 9 mL (472 mg total) by mouth 2 (two) times a day for 7 days, Disp: 82 6 mL, Rfl: 0    Current Facility-Administered Medications:     acetaminophen (TYLENOL) rectal suppository 120 mg, 120 mg, Rectal, Once, [employer], PA-C    Current Allergies     Allergies as of 09/02/2021    (No Known Allergies)            The following portions of the patient's history were reviewed and updated as appropriate: allergies, current medications, past family history, past medical history, past social history, past surgical history and problem list      No past medical history on file  No past surgical history on file  No family history on file  Medications have been verified  Objective   Pulse (!) 138   Temp (!) 102 8 °F (39 3 °C)   Resp (!) 22   Ht 30" (76 2 cm)   Wt 10 4 kg (22 lb 14 5 oz)   SpO2 100%   BMI 17 89 kg/m²        Physical Exam     Physical Exam  Vitals and nursing note reviewed  Constitutional:       General: He is active  He is irritable  HENT:      Head: Normocephalic and atraumatic  Right Ear: Tympanic membrane is erythematous  Tympanic membrane is not bulging  Left Ear: Tympanic membrane normal       Nose: Nose normal       Mouth/Throat:      Mouth: Mucous membranes are moist       Pharynx: Posterior oropharyngeal erythema ( bright erythema, no petechiae or swelling) present  Eyes:      Extraocular Movements: Extraocular movements intact  Pupils: Pupils are equal, round, and reactive to light  Cardiovascular:      Rate and Rhythm: Normal rate and regular rhythm  Pulses: Normal pulses  Heart sounds: Normal heart sounds  Pulmonary:      Effort: Pulmonary effort is normal       Breath sounds: Normal breath sounds  Skin:     General: Skin is warm  Neurological:      Mental Status: He is alert

## 2021-09-14 ENCOUNTER — ANCILLARY PROCEDURE (OUTPATIENT)
Dept: CT IMAGING | Facility: CLINIC | Age: 56
End: 2021-09-14
Attending: INTERNAL MEDICINE
Payer: COMMERCIAL

## 2021-09-14 ENCOUNTER — OFFICE VISIT (OUTPATIENT)
Dept: OPHTHALMOLOGY | Facility: CLINIC | Age: 56
End: 2021-09-14
Attending: OPHTHALMOLOGY
Payer: COMMERCIAL

## 2021-09-14 ENCOUNTER — LAB (OUTPATIENT)
Dept: LAB | Facility: CLINIC | Age: 56
End: 2021-09-14
Payer: COMMERCIAL

## 2021-09-14 DIAGNOSIS — Z79.899 LONG-TERM USE OF PLAQUENIL: Primary | ICD-10-CM

## 2021-09-14 DIAGNOSIS — H47.211 PRIMARY OPTIC ATROPHY OF RIGHT EYE: Primary | ICD-10-CM

## 2021-09-14 DIAGNOSIS — M31.30 GRANULOMATOSIS WITH POLYANGIITIS WITHOUT RENAL INVOLVEMENT (H): ICD-10-CM

## 2021-09-14 DIAGNOSIS — H05.10 IDIOPATHIC ORBITAL INFLAMMATORY SYNDROME: ICD-10-CM

## 2021-09-14 DIAGNOSIS — I77.82 ANCA-ASSOCIATED VASCULITIS (H): ICD-10-CM

## 2021-09-14 DIAGNOSIS — Z79.899 LONG-TERM USE OF PLAQUENIL: ICD-10-CM

## 2021-09-14 LAB
ALBUMIN SERPL-MCNC: 4.2 G/DL (ref 3.4–5)
ALBUMIN UR-MCNC: NEGATIVE MG/DL
ALT SERPL W P-5'-P-CCNC: 40 U/L (ref 0–50)
APPEARANCE UR: CLEAR
AST SERPL W P-5'-P-CCNC: 18 U/L (ref 0–45)
BASOPHILS # BLD AUTO: 0 10E3/UL (ref 0–0.2)
BASOPHILS NFR BLD AUTO: 0 %
BILIRUB UR QL STRIP: NEGATIVE
CD19 CELLS # BLD: <1 CELLS/UL (ref 107–698)
CD19 CELLS NFR BLD: <1 % (ref 6–27)
COLOR UR AUTO: YELLOW
CREAT SERPL-MCNC: 0.88 MG/DL (ref 0.52–1.04)
CREAT UR-MCNC: 210 MG/DL
CRP SERPL-MCNC: <2.9 MG/L (ref 0–8)
EOSINOPHIL # BLD AUTO: 0.5 10E3/UL (ref 0–0.7)
EOSINOPHIL NFR BLD AUTO: 5 %
ERYTHROCYTE [DISTWIDTH] IN BLOOD BY AUTOMATED COUNT: 13.4 % (ref 10–15)
ERYTHROCYTE [SEDIMENTATION RATE] IN BLOOD BY WESTERGREN METHOD: 8 MM/HR (ref 0–30)
GFR SERPL CREATININE-BSD FRML MDRD: 74 ML/MIN/1.73M2
GLUCOSE UR STRIP-MCNC: >499 MG/DL
HCT VFR BLD AUTO: 37.9 % (ref 35–47)
HGB BLD-MCNC: 12.8 G/DL (ref 11.7–15.7)
HGB UR QL STRIP: NEGATIVE
HYALINE CASTS: 1 /LPF
IMM GRANULOCYTES # BLD: 0 10E3/UL
IMM GRANULOCYTES NFR BLD: 0 %
KETONES UR STRIP-MCNC: 5 MG/DL
LEUKOCYTE ESTERASE UR QL STRIP: ABNORMAL
LYMPHOCYTES # BLD AUTO: 2.1 10E3/UL (ref 0.8–5.3)
LYMPHOCYTES NFR BLD AUTO: 22 %
MCH RBC QN AUTO: 25.5 PG (ref 26.5–33)
MCHC RBC AUTO-ENTMCNC: 33.8 G/DL (ref 31.5–36.5)
MCV RBC AUTO: 76 FL (ref 78–100)
MONOCYTES # BLD AUTO: 0.8 10E3/UL (ref 0–1.3)
MONOCYTES NFR BLD AUTO: 8 %
MUCOUS THREADS #/AREA URNS LPF: PRESENT /LPF
NEUTROPHILS # BLD AUTO: 6.5 10E3/UL (ref 1.6–8.3)
NEUTROPHILS NFR BLD AUTO: 65 %
NITRATE UR QL: NEGATIVE
NRBC # BLD AUTO: 0 10E3/UL
NRBC BLD AUTO-RTO: 0 /100
PH UR STRIP: 5 [PH] (ref 5–7)
PLATELET # BLD AUTO: 284 10E3/UL (ref 150–450)
PROT UR-MCNC: 0.25 G/L
PROT/CREAT 24H UR: 0.12 G/G CR (ref 0–0.2)
RBC # BLD AUTO: 5.01 10E6/UL (ref 3.8–5.2)
RBC URINE: 1 /HPF
SP GR UR STRIP: 1.02 (ref 1–1.03)
SQUAMOUS EPITHELIAL: 2 /HPF
UROBILINOGEN UR STRIP-MCNC: NORMAL MG/DL
WBC # BLD AUTO: 9.9 10E3/UL (ref 4–11)
WBC URINE: 2 /HPF

## 2021-09-14 PROCEDURE — 81001 URINALYSIS AUTO W/SCOPE: CPT | Performed by: PATHOLOGY

## 2021-09-14 PROCEDURE — 86255 FLUORESCENT ANTIBODY SCREEN: CPT | Mod: 90 | Performed by: PATHOLOGY

## 2021-09-14 PROCEDURE — 82040 ASSAY OF SERUM ALBUMIN: CPT | Performed by: PATHOLOGY

## 2021-09-14 PROCEDURE — 92082 INTERMEDIATE VISUAL FIELD XM: CPT | Performed by: OPHTHALMOLOGY

## 2021-09-14 PROCEDURE — 70480 CT ORBIT/EAR/FOSSA W/O DYE: CPT | Performed by: RADIOLOGY

## 2021-09-14 PROCEDURE — 85025 COMPLETE CBC W/AUTO DIFF WBC: CPT | Performed by: PATHOLOGY

## 2021-09-14 PROCEDURE — 82784 ASSAY IGA/IGD/IGG/IGM EACH: CPT | Mod: 90 | Performed by: PATHOLOGY

## 2021-09-14 PROCEDURE — 36415 COLL VENOUS BLD VENIPUNCTURE: CPT | Performed by: PATHOLOGY

## 2021-09-14 PROCEDURE — 86256 FLUORESCENT ANTIBODY TITER: CPT | Mod: 90 | Performed by: PATHOLOGY

## 2021-09-14 PROCEDURE — 84156 ASSAY OF PROTEIN URINE: CPT | Performed by: PATHOLOGY

## 2021-09-14 PROCEDURE — 86355 B CELLS TOTAL COUNT: CPT | Mod: 90 | Performed by: PATHOLOGY

## 2021-09-14 PROCEDURE — 86140 C-REACTIVE PROTEIN: CPT | Performed by: PATHOLOGY

## 2021-09-14 PROCEDURE — 84450 TRANSFERASE (AST) (SGOT): CPT | Performed by: PATHOLOGY

## 2021-09-14 PROCEDURE — 83516 IMMUNOASSAY NONANTIBODY: CPT | Mod: 90 | Performed by: PATHOLOGY

## 2021-09-14 PROCEDURE — G0463 HOSPITAL OUTPT CLINIC VISIT: HCPCS | Mod: 25

## 2021-09-14 PROCEDURE — 83876 ASSAY MYELOPEROXIDASE: CPT | Mod: 90 | Performed by: PATHOLOGY

## 2021-09-14 PROCEDURE — 84460 ALANINE AMINO (ALT) (SGPT): CPT | Performed by: PATHOLOGY

## 2021-09-14 PROCEDURE — 92014 COMPRE OPH EXAM EST PT 1/>: CPT | Performed by: OPHTHALMOLOGY

## 2021-09-14 PROCEDURE — 82565 ASSAY OF CREATININE: CPT | Performed by: PATHOLOGY

## 2021-09-14 PROCEDURE — 92133 CPTRZD OPH DX IMG PST SGM ON: CPT | Performed by: OPHTHALMOLOGY

## 2021-09-14 PROCEDURE — 85652 RBC SED RATE AUTOMATED: CPT | Performed by: PATHOLOGY

## 2021-09-14 ASSESSMENT — CONF VISUAL FIELD
OS_NORMAL: 1
OD_NORMAL: 1

## 2021-09-14 ASSESSMENT — TONOMETRY
IOP_METHOD: ICARE
OD_IOP_MMHG: 12
OS_IOP_MMHG: 15

## 2021-09-14 ASSESSMENT — VISUAL ACUITY
OS_CC: 20/20
METHOD: SNELLEN - LINEAR
OD_CC: 20/20

## 2021-09-14 ASSESSMENT — REFRACTION_WEARINGRX
OS_AXIS: 004
OS_SPHERE: -3.25
OD_ADD: +2.50
OD_AXIS: 167
OS_CYLINDER: +1.00
OD_SPHERE: -3.00
OD_CYLINDER: +1.25
OS_ADD: +2.50
SPECS_TYPE: 2MONTHS

## 2021-09-14 NOTE — PROGRESS NOTES
Assessment & Plan     Janine Cornell is a 56 year old female with the following diagnoses:   1. Primary optic atrophy of right eye    2. Long-term use of Plaquenil    3. ANCA-associated vasculitis (H)    4. Idiopathic orbital inflammatory syndrome         Patient was last seen on 1/6/2020 for follow up of GPA associated idiopathic orbital inflammatory syndrome right eye.  Last visit her proptosis was gone and visual acuity was better after starting Rituxan.  Her visual field was stable.  Some diplopia     Seen by Dr. Paredes and he recommended additional testing to look for plaquenil toxicity.  She stopped in June after being told she may have Plaquenil toxicity.  She had been on 5-6 years.  She is not sure if it was beneficial.      It is my impression that patient has no evidence of plaquenil toxicity.  If needed, then she could restart plaquenil.  She has optic atrophy RIGHT eye secondary to her previous orbital inflammatory syndrome, which appears to be quiescent today.                 Attending Physician Attestation:  Complete documentation of historical and exam elements from today's encounter can be found in the full encounter summary report (not reduplicated in this progress note).  I personally obtained the chief complaint(s) and history of present illness.  I confirmed and edited as necessary the review of systems, past medical/surgical history, family history, social history, and examination findings as documented by others; and I examined the patient myself.  I personally reviewed the relevant tests, images, and reports as documented above.  I formulated and edited as necessary the assessment and plan and discussed the findings and management plan with the patient and family. - Jas Vernon MD

## 2021-09-15 LAB
ANCA AB PATTERN SER IF-IMP: NORMAL
C-ANCA TITR SER IF: NORMAL {TITER}
IGA SERPL-MCNC: 201 MG/DL (ref 84–499)
IGG SERPL-MCNC: 590 MG/DL (ref 610–1616)
IGM SERPL-MCNC: 36 MG/DL (ref 35–242)

## 2021-09-15 ASSESSMENT — SLIT LAMP EXAM - LIDS
COMMENTS: NORMAL
COMMENTS: NORMAL

## 2021-09-15 ASSESSMENT — EXTERNAL EXAM - RIGHT EYE: OD_EXAM: NORMAL

## 2021-09-15 ASSESSMENT — EXTERNAL EXAM - LEFT EYE: OS_EXAM: NORMAL

## 2021-09-15 ASSESSMENT — CUP TO DISC RATIO
OD_RATIO: 0
OS_RATIO: 0

## 2021-09-17 ENCOUNTER — TELEPHONE (OUTPATIENT)
Dept: RHEUMATOLOGY | Facility: CLINIC | Age: 56
End: 2021-09-17

## 2021-09-17 LAB
MYELOPEROXIDASE AB SER IA-ACNC: 0.7 U/ML
MYELOPEROXIDASE AB SER IA-ACNC: NEGATIVE
PROTEINASE3 AB SER IA-ACNC: <1 U/ML
PROTEINASE3 AB SER IA-ACNC: NEGATIVE

## 2021-09-17 NOTE — TELEPHONE ENCOUNTER
Form Request Documentation    Type of form: Request for Medical Opinion  Date form received: Patient dropped off in person 9/16/2021  Date form is due: 9/24/2021  Provider: Pratibha  Last appointment: 9/20/2021  Next appointment: not scheduled  Have we filled out this form in the past? Yes, 10/30/2020  Information needed from patient: none  Status of form: placed in provider signature folder. Provider will be in clinic 9/22/2020    Beulah Fortune CMA   9/17/2021 2:44 PM

## 2021-09-22 DIAGNOSIS — E55.9 VITAMIN D DEFICIENCY: ICD-10-CM

## 2021-09-23 ENCOUNTER — TELEPHONE (OUTPATIENT)
Dept: OPHTHALMOLOGY | Facility: CLINIC | Age: 56
End: 2021-09-23

## 2021-09-23 NOTE — TELEPHONE ENCOUNTER
Form completed, signed and faxed to [facility] at [phone]. A copy has been placed in the mail to the patient. Patient notified via [employer].     Beulah Fortune CMA   9/23/2021 2:57 PM

## 2021-09-23 NOTE — TELEPHONE ENCOUNTER
[facility] Call Center    Phone Message    May a detailed message be left on voicemail: yes     Reason for Call: Form or Letter   Type or form/letter needing completion: Letter regarding medical necessity for blue tint to be placed on glasses for insurance coverage, to include diagnostic codes as well.  Provider: Dr. Vernon  Date form needed: ASAP  Once completed: Fax form to: [phone]      Action Taken: Message routed to:  [facility] EYE    Travel Screening: Not Applicable

## 2021-09-23 NOTE — LETTER
2021            RE: Janine Cornell  : 1965  MRN: [id number]    To Whom It May Concern:    I am the ophthalmologist treating Janine oCrnell.  She suffers from extreme photophobia (ICD-10 H53.149) due to orbital inflammatory syndrome (ICD-10 H05.10). FL-41 tinted glasses and Blutech lenses have been shown to improve light sensitivity and thus improve quality of life.  Janine Cornell would benefit from FL-41 or Blutech tinted glasses.  Please consider providing coverage of FL-41 or Blutech tinted lenses for this patient as they are medically necessary.    If you have any questions please feel free to call my office at number above.    Sincerely,        Jas Vernon MD  Professor, Neuro-Ophthalmology  Department of Ophthalmology and Visual Neurosciences  [facility]

## 2021-09-24 NOTE — TELEPHONE ENCOUNTER
VIT  D2  [id number]  Last Written Prescription Date:  5/10/21  Last Fill Quantity: 12,   # refills: 0  Last Office Visit : 8/20/21  Future Office visit:  NONE  Routing refill request to provider for review/approval because:  Drug not on the refill protocol

## 2021-09-27 RX ORDER — CHOLECALCIFEROL (VITAMIN D3) 125 MCG
2000 TABLET ORAL DAILY
Qty: 90 TABLET | Refills: 1 | Status: SHIPPED | OUTPATIENT
Start: 2021-09-27 | End: 2023-04-25

## 2021-09-27 RX ORDER — ERGOCALCIFEROL 1.25 MG/1
CAPSULE, LIQUID FILLED ORAL
Qty: 12 CAPSULE | Refills: 0 | OUTPATIENT
Start: 2021-09-27

## 2021-10-08 ENCOUNTER — TELEPHONE (OUTPATIENT)
Dept: RHEUMATOLOGY | Facility: CLINIC | Age: 56
End: 2021-10-08

## 2021-10-08 DIAGNOSIS — I77.82 ANCA-ASSOCIATED VASCULITIS (H): ICD-10-CM

## 2021-10-08 DIAGNOSIS — M31.30 GRANULOMATOSIS WITH POLYANGIITIS WITHOUT RENAL INVOLVEMENT (H): Primary | ICD-10-CM

## 2021-10-08 DIAGNOSIS — E55.9 VITAMIN D DEFICIENCY: ICD-10-CM

## 2021-10-10 RX ORDER — DIPHENHYDRAMINE HCL 25 MG
50 CAPSULE ORAL ONCE
Status: CANCELLED
Start: 2021-10-10

## 2021-10-10 RX ORDER — ACETAMINOPHEN 325 MG/1
650 TABLET ORAL ONCE
Status: CANCELLED
Start: 2021-10-10

## 2021-10-10 RX ORDER — MEPERIDINE HYDROCHLORIDE 25 MG/ML
25 INJECTION INTRAMUSCULAR; INTRAVENOUS; SUBCUTANEOUS EVERY 30 MIN PRN
Status: CANCELLED | OUTPATIENT
Start: 2021-10-10

## 2021-10-10 RX ORDER — METHYLPREDNISOLONE SODIUM SUCCINATE 125 MG/2ML
125 INJECTION, POWDER, LYOPHILIZED, FOR SOLUTION INTRAMUSCULAR; INTRAVENOUS
Status: CANCELLED
Start: 2021-10-10

## 2021-10-10 RX ORDER — METHYLPREDNISOLONE SODIUM SUCCINATE 125 MG/2ML
125 INJECTION, POWDER, LYOPHILIZED, FOR SOLUTION INTRAMUSCULAR; INTRAVENOUS ONCE
Status: CANCELLED
Start: 2021-10-10

## 2021-10-10 RX ORDER — HEPARIN SODIUM,PORCINE 10 UNIT/ML
5 VIAL (ML) INTRAVENOUS
Status: CANCELLED | OUTPATIENT
Start: 2021-10-10

## 2021-10-10 RX ORDER — NALOXONE HYDROCHLORIDE 0.4 MG/ML
0.2 INJECTION, SOLUTION INTRAMUSCULAR; INTRAVENOUS; SUBCUTANEOUS
Status: CANCELLED | OUTPATIENT
Start: 2021-10-10

## 2021-10-10 RX ORDER — HEPARIN SODIUM (PORCINE) LOCK FLUSH IV SOLN 100 UNIT/ML 100 UNIT/ML
5 SOLUTION INTRAVENOUS
Status: CANCELLED | OUTPATIENT
Start: 2021-10-10

## 2021-10-10 RX ORDER — EPINEPHRINE 1 MG/ML
0.3 INJECTION, SOLUTION, CONCENTRATE INTRAVENOUS EVERY 5 MIN PRN
Status: CANCELLED | OUTPATIENT
Start: 2021-10-10

## 2021-10-10 RX ORDER — DIPHENHYDRAMINE HYDROCHLORIDE 50 MG/ML
50 INJECTION INTRAMUSCULAR; INTRAVENOUS
Status: CANCELLED
Start: 2021-10-10

## 2021-10-10 RX ORDER — ALBUTEROL SULFATE 0.83 MG/ML
2.5 SOLUTION RESPIRATORY (INHALATION)
Status: CANCELLED | OUTPATIENT
Start: 2021-10-10

## 2021-10-10 RX ORDER — ALBUTEROL SULFATE 90 UG/1
1-2 AEROSOL, METERED RESPIRATORY (INHALATION)
Status: CANCELLED
Start: 2021-10-10

## 2021-10-11 NOTE — TELEPHONE ENCOUNTER
Message sent to [facility]  asking them to call myself or pt to schedule for this month.    Desiree Godinez RN  Rheumatology Clinic

## 2021-10-11 NOTE — TELEPHONE ENCOUNTER
Pt has an appt at [facility] on 10/18 @ 8 am.  Attempted to call pt, no answer.    Desiree Godinez RN  Rheumatology Clinic

## 2021-10-12 NOTE — TELEPHONE ENCOUNTER
Pt did not want to wait until her next set of labs in 2 months.  Ordered vitamin D level for Monday with her infusion.    Desiree Godinez RN  Rheumatology Clinic

## 2021-10-12 NOTE — TELEPHONE ENCOUNTER
Called and updated pt with date and time of infusion.    Desiree Godinez RN  Rheumatology Clinic

## 2021-10-18 ENCOUNTER — INFUSION THERAPY VISIT (OUTPATIENT)
Dept: INFUSION THERAPY | Facility: CLINIC | Age: 56
End: 2021-10-18
Attending: INTERNAL MEDICINE
Payer: COMMERCIAL

## 2021-10-18 VITALS
SYSTOLIC BLOOD PRESSURE: 127 MMHG | RESPIRATION RATE: 16 BRPM | BODY MASS INDEX: 30.5 KG/M2 | OXYGEN SATURATION: 98 % | WEIGHT: 172.2 LBS | DIASTOLIC BLOOD PRESSURE: 75 MMHG | TEMPERATURE: 98.4 F | HEART RATE: 77 BPM

## 2021-10-18 DIAGNOSIS — I77.82 ANCA-ASSOCIATED VASCULITIS (H): Primary | ICD-10-CM

## 2021-10-18 DIAGNOSIS — E55.9 VITAMIN D DEFICIENCY: ICD-10-CM

## 2021-10-18 DIAGNOSIS — M31.30 GRANULOMATOSIS WITH POLYANGIITIS (H): ICD-10-CM

## 2021-10-18 DIAGNOSIS — M31.30 GRANULOMATOSIS WITH POLYANGIITIS WITHOUT RENAL INVOLVEMENT (H): ICD-10-CM

## 2021-10-18 LAB
CD19 CELLS # BLD: <1 CELLS/UL (ref 107–698)
CD19 CELLS NFR BLD: <1 % (ref 6–27)
DEPRECATED CALCIDIOL+CALCIFEROL SERPL-MC: 29 UG/L (ref 20–75)
IGA SERPL-MCNC: 179 MG/DL (ref 84–499)
IGG SERPL-MCNC: 581 MG/DL (ref 610–1616)
IGM SERPL-MCNC: 35 MG/DL (ref 35–242)

## 2021-10-18 PROCEDURE — 96366 THER/PROPH/DIAG IV INF ADDON: CPT

## 2021-10-18 PROCEDURE — 96367 TX/PROPH/DG ADDL SEQ IV INF: CPT

## 2021-10-18 PROCEDURE — 86355 B CELLS TOTAL COUNT: CPT | Performed by: INTERNAL MEDICINE

## 2021-10-18 PROCEDURE — 250N000013 HC RX MED GY IP 250 OP 250 PS 637: Performed by: INTERNAL MEDICINE

## 2021-10-18 PROCEDURE — 258N000003 HC RX IP 258 OP 636: Performed by: INTERNAL MEDICINE

## 2021-10-18 PROCEDURE — 96375 TX/PRO/DX INJ NEW DRUG ADDON: CPT

## 2021-10-18 PROCEDURE — 82784 ASSAY IGA/IGD/IGG/IGM EACH: CPT | Performed by: INTERNAL MEDICINE

## 2021-10-18 PROCEDURE — 96365 THER/PROPH/DIAG IV INF INIT: CPT

## 2021-10-18 PROCEDURE — 82306 VITAMIN D 25 HYDROXY: CPT

## 2021-10-18 PROCEDURE — 250N000011 HC RX IP 250 OP 636: Performed by: INTERNAL MEDICINE

## 2021-10-18 PROCEDURE — 36415 COLL VENOUS BLD VENIPUNCTURE: CPT | Performed by: INTERNAL MEDICINE

## 2021-10-18 RX ORDER — EPINEPHRINE 1 MG/ML
0.3 INJECTION, SOLUTION, CONCENTRATE INTRAVENOUS EVERY 5 MIN PRN
Status: CANCELLED | OUTPATIENT
Start: 2021-10-18

## 2021-10-18 RX ORDER — DIPHENHYDRAMINE HYDROCHLORIDE 50 MG/ML
50 INJECTION INTRAMUSCULAR; INTRAVENOUS
Status: CANCELLED
Start: 2021-10-18

## 2021-10-18 RX ORDER — METHYLPREDNISOLONE SODIUM SUCCINATE 125 MG/2ML
125 INJECTION, POWDER, LYOPHILIZED, FOR SOLUTION INTRAMUSCULAR; INTRAVENOUS ONCE
Status: COMPLETED | OUTPATIENT
Start: 2021-10-18 | End: 2021-10-18

## 2021-10-18 RX ORDER — HEPARIN SODIUM,PORCINE 10 UNIT/ML
5 VIAL (ML) INTRAVENOUS
Status: CANCELLED | OUTPATIENT
Start: 2021-10-18

## 2021-10-18 RX ORDER — ACETAMINOPHEN 325 MG/1
650 TABLET ORAL ONCE
Status: COMPLETED | OUTPATIENT
Start: 2021-10-18 | End: 2021-10-18

## 2021-10-18 RX ORDER — DIPHENHYDRAMINE HCL 25 MG
50 CAPSULE ORAL ONCE
Status: CANCELLED
Start: 2021-10-18

## 2021-10-18 RX ORDER — METHYLPREDNISOLONE SODIUM SUCCINATE 125 MG/2ML
125 INJECTION, POWDER, LYOPHILIZED, FOR SOLUTION INTRAMUSCULAR; INTRAVENOUS ONCE
Status: CANCELLED
Start: 2021-10-18

## 2021-10-18 RX ORDER — METHYLPREDNISOLONE SODIUM SUCCINATE 125 MG/2ML
125 INJECTION, POWDER, LYOPHILIZED, FOR SOLUTION INTRAMUSCULAR; INTRAVENOUS
Status: CANCELLED
Start: 2021-10-18

## 2021-10-18 RX ORDER — MEPERIDINE HYDROCHLORIDE 25 MG/ML
25 INJECTION INTRAMUSCULAR; INTRAVENOUS; SUBCUTANEOUS EVERY 30 MIN PRN
Status: CANCELLED | OUTPATIENT
Start: 2021-10-18

## 2021-10-18 RX ORDER — ALBUTEROL SULFATE 0.83 MG/ML
2.5 SOLUTION RESPIRATORY (INHALATION)
Status: CANCELLED | OUTPATIENT
Start: 2021-10-18

## 2021-10-18 RX ORDER — NALOXONE HYDROCHLORIDE 0.4 MG/ML
0.2 INJECTION, SOLUTION INTRAMUSCULAR; INTRAVENOUS; SUBCUTANEOUS
Status: CANCELLED | OUTPATIENT
Start: 2021-10-18

## 2021-10-18 RX ORDER — ALBUTEROL SULFATE 90 UG/1
1-2 AEROSOL, METERED RESPIRATORY (INHALATION)
Status: CANCELLED
Start: 2021-10-18

## 2021-10-18 RX ORDER — ACETAMINOPHEN 325 MG/1
650 TABLET ORAL ONCE
Status: CANCELLED
Start: 2021-10-18

## 2021-10-18 RX ORDER — HEPARIN SODIUM (PORCINE) LOCK FLUSH IV SOLN 100 UNIT/ML 100 UNIT/ML
5 SOLUTION INTRAVENOUS
Status: CANCELLED | OUTPATIENT
Start: 2021-10-18

## 2021-10-18 RX ADMIN — DIPHENHYDRAMINE HYDROCHLORIDE 50 MG: 50 INJECTION, SOLUTION INTRAMUSCULAR; INTRAVENOUS at 09:18

## 2021-10-18 RX ADMIN — ACETAMINOPHEN 650 MG: 325 TABLET, FILM COATED ORAL at 08:55

## 2021-10-18 RX ADMIN — METHYLPREDNISOLONE SODIUM SUCCINATE 125 MG: 125 INJECTION, POWDER, FOR SOLUTION INTRAMUSCULAR; INTRAVENOUS at 08:57

## 2021-10-18 RX ADMIN — RITUXIMAB-ABBS 1000 MG: 10 INJECTION, SOLUTION INTRAVENOUS at 09:58

## 2021-10-18 ASSESSMENT — PAIN SCALES - GENERAL: PAINLEVEL: MODERATE PAIN (5)

## 2021-10-18 NOTE — PROGRESS NOTES
Infusion Nursing Note:  Janine CHELLE Whitmoreey presents today for rituxan.    Patient seen by provider today: No   present during visit today: Not Applicable.    Note: Patient denies any new concerns at this time.      Intravenous Access:  Labs drawn without difficulty.  Peripheral IV placed.    Treatment Conditions:  Biological Infusion Checklist:  ~~~ NOTE: If the patient answers yes to any of the questions below, hold the infusion and contact ordering provider or on-call provider.    1. Have you recently had an elevated temperature, fever, chills, productive cough, coughing for 3 weeks or longer or hemoptysis, abnormal vital signs, night sweats,  chest pain or have you noticed a decrease in your appetite, unexplained weight loss or fatigue? No  2. Do you have any open wounds or new incisions? No  3. Do you have any recent or upcoming hospitalizations, surgeries or dental procedures? No  4. Do you currently have or recently have had any signs of illness or infection or are you on any antibiotics? No  5. Have you had any new, sudden or worsening abdominal pain? No  6. Have you or anyone in your household received a live vaccination in the past 4 weeks? Please note:  No live vaccines while on biologic/chemotherapy until 6 months after the last treatment.  Patient can receive the flu vaccine (shot only) and the pneumovax.  It is optimal for the patient to get these vaccines mid cycle, but they can be given at any time as long as it is not on the day of the infusion. No  7. Have you recently been diagnosed with any new nervous system diseases (ie. Multiple sclerosis, Guillain [place], seizures, neurological changes) or cancer diagnosis? No  8. Are you on any form of radiation or chemotherapy? No  9. Are you pregnant or breast feeding or do you have plans of pregnancy in the future? No  10. Have you been having any signs of worsening depression or suicidal ideations?  (benlysta only) No  11. Have there been any other  new onset medical symptoms? No        Post Infusion Assessment:  Patient tolerated infusion without incident.  rituxan started at 50cc/hr and increased by 50cc/hr to max rate of 200cc/hr.  Blood return noted pre and post infusion.  Site patent and intact, free from redness, edema or discomfort.  No evidence of extravasations.  Access discontinued per protocol.       Discharge Plan:   Patient discharged in stable condition accompanied by: self.  Departure Mode: Ambulatory.  Will follow up with Dr. Chahal.    Nahed Yang RN

## 2021-10-18 NOTE — LETTER
December 25, 2021      Janineyael Cornell  [address] [place] [address]        Dear ,    We are writing to inform you of your test results.    CD19 is low, means rituximab is still in your system. Recommend to continue taking vitamin D 2000 units a day.    Resulted Orders   CD19 B Cell Count   Result Value Ref Range    CD19% B Cells <1 (L) 6 - 27 %    Absolute CD19, B Cells <1 (L) 107 - 698 cells/uL   Immunoglobulins A G and M   Result Value Ref Range    Immunoglobulin G 581 (L) 610-1,616 mg/dL    Immunoglobulin A 179 84 - 499 mg/dL    Immunoglobulin M 35 35 - 242 mg/dL   Vitamin D Deficiency   Result Value Ref Range    Vitamin D, Total (25-Hydroxy) 29 20 - 75 ug/L    Narrative    Season, race, dietary intake, and treatment affect the concentration of 25-hydroxy-Vitamin D. Values may decrease during winter months and increase during summer months. Values 20-29 ug/L may indicate Vitamin D insufficiency and values <20 ug/L may indicate Vitamin D deficiency.    Vitamin D determination is routinely performed by an immunoassay specific for 25 hydroxyvitamin D3.  If an individual is on vitamin D2(ergocalciferol) supplementation, please specify 25 OH vitamin D2 and D3 level determination by LCMSMS test [id number].         If you have any questions or concerns, please call the clinic at the number listed above.       Sincerely,      Majo Mckinnon MD

## 2021-10-28 DIAGNOSIS — M35.9 UNDIFFERENTIATED CONNECTIVE TISSUE DISEASE (H): ICD-10-CM

## 2021-10-28 DIAGNOSIS — E55.9 VITAMIN D DEFICIENCY: ICD-10-CM

## 2021-10-29 NOTE — TELEPHONE ENCOUNTER
TRAMADOL 50MG TABLETS      Last Written Prescription Date:  4-22-20  Last Fill Quantity: 60,   # refills: 3  Last Office Visit : 8-20-21  Future Office visit:  none    Routing refill request to provider for review/approval because:  Drug not on the [facility] or [facility] refill protocol or controlled substance      VITAMIN D2 50,000IU (ERGO) CAP RX      Last Written Prescription Date:  5-10-21  Last Fill Quantity: 12,   # refills: 0     also on current med list: Ergocalciferol (VITAMIN D2) 50 MCG (2000 UT) TABS  Routing refill request to provider for review/approval because:  Med not on protocol

## 2021-11-01 RX ORDER — ERGOCALCIFEROL 1.25 MG/1
CAPSULE, LIQUID FILLED ORAL
Qty: 12 CAPSULE | Refills: 0 | Status: SHIPPED | OUTPATIENT
Start: 2021-11-01 | End: 2021-12-07

## 2021-11-01 RX ORDER — TRAMADOL HYDROCHLORIDE 50 MG/1
TABLET ORAL
Qty: 60 TABLET | Refills: 3 | Status: SHIPPED | OUTPATIENT
Start: 2021-11-01 | End: 2022-05-31

## 2021-11-02 ENCOUNTER — TELEPHONE (OUTPATIENT)
Dept: CARDIOLOGY | Facility: CLINIC | Age: 56
End: 2021-11-02

## 2021-11-02 NOTE — TELEPHONE ENCOUNTER
LM for patient to call back. RX states to do 30 day refills only and patient should have enough refills until 07/2022.

## 2021-11-02 NOTE — TELEPHONE ENCOUNTER
[facility] Call Center    Phone Message    May a detailed message be left on voicemail: yes     Reason for Call: Other: Janine calling stating that the spironolactone (ALDACTONE) 25 MG tablet is a 30 day supply, however, she normally gets 45 tablets so that if she needs to take an additonal half tablet as needed, she can. She states the past few months, she is only getting 30 tablets when orginally she is suppose to get 45. Please call and discuss. Thank you!     Action Taken: Message routed to:  [facility] Cardio    Travel Screening: Not Applicable

## 2021-11-02 NOTE — TELEPHONE ENCOUNTER
[facility] Call Center    Phone Message    May a detailed message be left on voicemail: yes     Reason for Call: Other: Pt would like a call back as she is not able to get her medication when needed and not getting enough for her refills and she would like to discuss asap as she is running out of medications, She would lke proper amount and more refills     Action Taken: Message routed to:  [facility] ([facility]): Cardio    Travel Screening: Not Applicable

## 2021-11-04 DIAGNOSIS — I10 HYPERTENSION GOAL BP (BLOOD PRESSURE) < 140/90: ICD-10-CM

## 2021-11-04 RX ORDER — SPIRONOLACTONE 25 MG/1
25 TABLET ORAL DAILY
Qty: 45 TABLET | Refills: 11 | Status: SHIPPED | OUTPATIENT
Start: 2021-11-04 | End: 2022-07-07

## 2021-11-04 NOTE — TELEPHONE ENCOUNTER
Spoke to patient and relayed message that her spironolactone was corrected and refilled for 45 day supply to cover her as needed for weight gain.     Updated notes in chart in Medication List Comments and Snap Shot.     Patient states understanding and agrees to call with any questions or concerns.

## 2021-11-05 DIAGNOSIS — I77.82 ANCA-ASSOCIATED VASCULITIS (H): Primary | ICD-10-CM

## 2021-11-05 DIAGNOSIS — M79.7 FIBROMYALGIA: ICD-10-CM

## 2021-11-05 RX ORDER — CYCLOBENZAPRINE HCL 10 MG
10 TABLET ORAL 2 TIMES DAILY PRN
Qty: 60 TABLET | Refills: 3 | Status: SHIPPED | OUTPATIENT
Start: 2021-11-05 | End: 2023-12-07

## 2021-11-05 NOTE — TELEPHONE ENCOUNTER
Pt states that she feels very out of sorts and her joints are very painful right now. She is struggling to make it up the stairs without stopping. She is wondering if it is time to revisit some type of therapy, whether it be pool or acupuncture or chiropractic.  She feels like things are getting worse.  She would like a referral for pool therapy so she could get started on it before she sees Dr. Mckinnon.    Desiree Godinez RN  [facility]

## 2021-11-21 DIAGNOSIS — M19.90 INFLAMMATORY ARTHRITIS: ICD-10-CM

## 2021-11-22 NOTE — NURSING NOTE
"Chief Complaint   Patient presents with     RECHECK     Follow up for Seronegative rheumatoid arthritis        Initial /82  Pulse 72  Ht 1.6 m (5' 3\")  Wt 76.2 kg (168 lb)  SpO2 99%  BMI 29.76 kg/m2 Estimated body mass index is 29.76 kg/(m^2) as calculated from the following:    Height as of this encounter: 1.6 m (5' 3\").    Weight as of this encounter: 76.2 kg (168 lb).  Medication Reconciliation: complete   Hnanah Whitehead CMA     " no

## 2021-11-23 NOTE — TELEPHONE ENCOUNTER
MAGNESIUM 250MG TABLETS  Last Written Prescription Date:  5/10/2021  Last Fill Quantity: 60,   # refills: 3  Last Office Visit : 8/20/2021  Future Office visit:  12/16/2021    Routing refill request to provider for review/approval because:  Clarify orders.     Is Pt taking 250 Mg's a day?  Or 500 Mg's a day?  Order in chart has 2 different sets of directions.  Drug not on the [facility], [facility] or  [facility] refill protocol or controlled substance      Georgia Mercedes RN  Central Triage Red Flags/Med Refills

## 2021-11-25 RX ORDER — MULTIVITAMIN WITH IRON
TABLET ORAL
Qty: 60 TABLET | Refills: 1 | Status: SHIPPED | OUTPATIENT
Start: 2021-11-25 | End: 2022-02-01

## 2021-12-02 ENCOUNTER — ANCILLARY PROCEDURE (OUTPATIENT)
Dept: MAMMOGRAPHY | Facility: CLINIC | Age: 56
End: 2021-12-02
Attending: FAMILY MEDICINE
Payer: COMMERCIAL

## 2021-12-02 DIAGNOSIS — Z12.31 VISIT FOR SCREENING MAMMOGRAM: ICD-10-CM

## 2021-12-02 PROCEDURE — 77067 SCR MAMMO BI INCL CAD: CPT | Mod: GC

## 2021-12-04 DIAGNOSIS — E55.9 VITAMIN D DEFICIENCY: ICD-10-CM

## 2021-12-07 NOTE — TELEPHONE ENCOUNTER
VITAMIN D2 50,000IU (ERGO) CAP RX      Last Written Prescription Date:  11-1-2021  Last Fill Quantity: 12,   # refills: 0  Last Office Visit : 8-  Future Office visit:  12-    Routing refill request to provider for review/approval because:  NOTE: pt has 2 other active Vitamin D orders on medication list in addition to this.        Kathleen M Doege RN

## 2021-12-08 NOTE — TELEPHONE ENCOUNTER
Spoke with patient who stated that she is currently taking the 50,000 units of vitamin D and does need ar efill on this one.     Patient is also wondering if Dr. Mckinnon needs her to get her labs done prior to her appointment with her on 12/16/2021. Current orders are in chart and they were last done 10/18/2021.    Beulah Fortune, UZAIR   12/8/2021 11:30 AM

## 2021-12-09 RX ORDER — ERGOCALCIFEROL 1.25 MG/1
50000 CAPSULE, LIQUID FILLED ORAL WEEKLY
Qty: 12 CAPSULE | Refills: 0 | Status: SHIPPED | OUTPATIENT
Start: 2021-12-09 | End: 2022-03-07

## 2021-12-16 ENCOUNTER — OFFICE VISIT (OUTPATIENT)
Dept: RHEUMATOLOGY | Facility: CLINIC | Age: 56
End: 2021-12-16
Attending: INTERNAL MEDICINE
Payer: COMMERCIAL

## 2021-12-16 VITALS
DIASTOLIC BLOOD PRESSURE: 92 MMHG | HEART RATE: 69 BPM | WEIGHT: 174.9 LBS | SYSTOLIC BLOOD PRESSURE: 134 MMHG | OXYGEN SATURATION: 96 % | BODY MASS INDEX: 30.98 KG/M2

## 2021-12-16 DIAGNOSIS — I77.82 ANCA-ASSOCIATED VASCULITIS (H): ICD-10-CM

## 2021-12-16 DIAGNOSIS — E55.9 VITAMIN D DEFICIENCY: Primary | ICD-10-CM

## 2021-12-16 DIAGNOSIS — M17.10 ARTHRITIS OF KNEE: ICD-10-CM

## 2021-12-16 DIAGNOSIS — Z51.81 MEDICATION MONITORING ENCOUNTER: ICD-10-CM

## 2021-12-16 PROCEDURE — 99214 OFFICE O/P EST MOD 30 MIN: CPT | Performed by: INTERNAL MEDICINE

## 2021-12-16 ASSESSMENT — PAIN SCALES - GENERAL: PAINLEVEL: MODERATE PAIN (5)

## 2021-12-16 NOTE — LETTER
12/16/2021       RE: Janine Cornell  [address]  [facility] [address]     Dear Colleague,    Thank you for referring your patient, Janine Cornell, to the [facility] RHEUMATOLOGY CLINIC [place] at [facility]. Please see a copy of my visit note below.    Rheumatology F/U In Person Visit Note    Last visit note: 8/20/2021    Today's visit date: 12/16/2021    Reason for in person visit: F/U GPA      HPI     Ms. 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 She is on HCQ since 5/2014. On rituximab since 12/20218 with great response. Previously tried and did not tolerate MTX, AZA.      She had lateral orbitotomy and debulking orbital mass right eye on 9/26/18 with Dr. Shah and Dr. Valdez at [facility]. 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 There were no complications according to the op note.      4/21/2021: Had last rituximab 1 gram on 1/19, 2/2/2021. Reports rituximab starts to wear off earlier, has more sx this time. Eye swelling is intermittent. Gets indigestion/ GERD sx with constipation after the infusion.    She reports having asthma, swelling of neck, arms. She thinks that it could be from her vasculitis. She is worried about going down on rituximab dose.     Otherwise unchanged sx, no SOB or hemoptysis or persistent cough, or     Somedays her knees and hips hurt a lot, feel like bone on bone in her knees, especially on changing position.    5/10/2021:      Joint ache, knees hurt, R elbow hurts, R arm hurts, R hand hurts. Has lots of swelling in her joints especially hands.    Depending on weather, joints jhurt, sometimes pain is 7/10.    Has problem with taking stairs and balance.    Gets off balance.     R eye gets tinglin, swelling.    Gets out of breath, gets worse with seasonal allergies.    Over past month and half, took nitro  more, like 8 times.    No hemooptysis, no hematuria.    Has allergies.      8/20/2021: Janine has pain over arms, knees. Some days, feel stiff all day long. Some days can't do stairs. Hard to tell if there is swelling as has more water retention during summer.    Some days, eye swells up like today. No fevers, SOB, CP or cough.    Has rosacea but some days wakes up with heat rash over sun. Her face is peeling.    Sometimes can't lift things or grab things with pain from elbow to hands. Has shoulder and neck pain but this forearm pain is new.    Stopped HCQ in mid July due to concern for potential HCQ toxicity on OCT, her eye exam with Dr. Vernon has been re-scheduled and upcoming on 9/14 to address HCQ toxicity, pain is not worse off HCQ.    Not COVID vaccinated but is cautious.      Today 12/16/2021: Janine presents for follow up. Reports ESOB with cold, has ongoing joint pain. R eye pain is intermittent.    Reports headaches after rituximab 1 gram on 10/18/2021.    Last week, her R knee was hurting.      Component      Latest Ref Rng 2/28/2013 2/28/2013          12:14 PM 12:14 PM   WBC      4.0 - 11.0 10e9/L     RBC Count      3.8 - 5.2 10e12/L     Hemoglobin      11.7 - 15.7 g/dL     Hematocrit      35.0 - 47.0 %     MCV      78 - 100 fl     MCH      26.5 - 33.0 pg     MCHC      31.5 - 36.5 g/dL     RDW      10.0 - 15.0 %     Platelet Count      150 - 450 10e9/L     Specimen Description           Lyme Screen IgG and IgM           Vitamin D Deficiency screening      30 - 75 ug/L     Ferritin      10 - 300 ng/mL     Sed Rate      0 - 20 mm/h     ALLI Screen by EIA      <1.0     Rheumatoid Factor      0 - 14 IU/mL     CK Total      30 - 225 U/L  78   Uric Acid      2.5 - 7.5 mg/dL 6.7      Component      Latest Ref Rng 2/28/2013          12:14 PM   WBC      4.0 - 11.0 10e9/L    RBC Count      3.8 - 5.2 10e12/L    Hemoglobin      11.7 - 15.7 g/dL    Hematocrit      35.0 - 47.0 %    MCV      78 - 100 fl    MCH      26.5 -  33.0 pg    MCHC      31.5 - 36.5 g/dL    RDW      10.0 - 15.0 %    Platelet Count      150 - 450 10e9/L    Specimen Description       Serum   Lyme Screen IgG and IgM       Test value: <0.75....Interpretation: Negative....If you highly suspect Lyme . . .   Vitamin D Deficiency screening      30 - 75 ug/L    Ferritin      10 - 300 ng/mL    Sed Rate      0 - 20 mm/h    ALLI Screen by EIA      <1.0    Rheumatoid Factor      0 - 14 IU/mL    CK Total      30 - 225 U/L    Uric Acid      2.5 - 7.5 mg/dL      Component      Latest Ref Rng 2/28/2013 2/28/2013 2/28/2013 2/28/2013          12:14 PM 12:14 PM 12:14 PM 12:14 PM   WBC      4.0 - 11.0 10e9/L       RBC Count      3.8 - 5.2 10e12/L       Hemoglobin      11.7 - 15.7 g/dL       Hematocrit      35.0 - 47.0 %       MCV      78 - 100 fl       MCH      26.5 - 33.0 pg       MCHC      31.5 - 36.5 g/dL       RDW      10.0 - 15.0 %       Platelet Count      150 - 450 10e9/L       Specimen Description             Lyme Screen IgG and IgM             Vitamin D Deficiency screening      30 - 75 ug/L       Ferritin      10 - 300 ng/mL    10   Sed Rate      0 - 20 mm/h   23 (H)    ALLI Screen by EIA      <1.0  <1.0 . . .     Rheumatoid Factor      0 - 14 IU/mL 26 (H)      CK Total      30 - 225 U/L       Uric Acid      2.5 - 7.5 mg/dL         5/2013  CBC WITH PLATELETS DIFFERENTIAL       Component Value Range    WBC 11.3 (*) 4.0 - 11.0 10e9/L    RBC Count 4.56  3.8 - 5.2 10e12/L    Hemoglobin 11.1 (*) 11.7 - 15.7 g/dL    Hematocrit 34.3 (*) 35.0 - 47.0 %    MCV 75 (*) 78 - 100 fl    MCH 24.3 (*) 26.5 - 33.0 pg    MCHC 32.4  31.5 - 36.5 g/dL    RDW 16.1 (*) 10.0 - 15.0 %    Platelet Count 315  150 - 450 10e9/L    Diff Method Automated Method      % Neutrophils 71.6  40 - 75 %    % Lymphocytes 20.9  20 - 48 %    % Monocytes 4.3  0 - 12 %    % Eosinophils 2.8  0 - 6 %    % Basophils 0.2  0 - 2 %    % Immature Granulocytes 0.2  0 - 0.4 %    Absolute Neutrophil 8.1  1.6 - 8.3 10e9/L     Absolute Lymphocytes 2.4  0.8 - 5.3 10e9/L    Absolute Monoctyes 0.5  0.0 - 1.3 10e9/L    Absolute Eosinophils 0.3  0.0 - 0.7 10e9/L    Absolute Basophils 0.0  0.0 - 0.2 10e9/L    Abs Immature Granulocytes 0.0  0 - 0.03 10e9/L   AMYLASE       Component Value Range    Amylase 103  30 - 110 U/L   COMPREHENSIVE METABOLIC PANEL       Component Value Range    Sodium 144  133 - 144 mmol/L    Potassium 3.8  3.4 - 5.3 mmol/L    Chloride 105  94 - 109 mmol/L    Carbon Dioxide 23  20 - 32 mmol/L    Anion Gap 17  6 - 17 mmol/L    Glucose 173 (*) 60 - 99 mg/dL    Urea Nitrogen 13  5 - 24 mg/dL    Creatinine 0.83  0.52 - 1.04 mg/dL    GFR Estimate 74  >60 mL/min/1.7m2    GFR Estimate If Black 90  >60 mL/min/1.7m2    Calcium 9.7  8.5 - 10.4 mg/dL    Bilirubin Total 0.4  0.2 - 1.3 mg/dL    Albumin 4.3  3.9 - 5.1 g/dL    Protein Total 7.8  6.8 - 8.8 g/dL    Alkaline Phosphatase 66  40 - 150 U/L    ALT 36  0 - 50 U/L    AST 28  0 - 45 U/L   CK TOTAL       Component Value Range    CK Total 66  30 - 225 U/L   CRP INFLAMMATION       Component Value Range    CRP Inflammation 10.4 (*) 0.0 - 8.0 mg/L   LIPASE       Component Value Range    Lipase 353 (*) 20 - 250 U/L   ERYTHROCYTE SEDIMENTATION RATE AUTO       Component Value Range    Sed Rate 26 (*) 0 - 20 mm/h   ROUTINE UA WITH MICROSCOPIC REFLEX TO CULTURE       Component Value Range    Color Urine Yellow      Appearance Urine Slightly Cloudy      Glucose Urine 30 (*) NEG mg/dL    Bilirubin Urine Negative  NEG    Ketones Urine 5 (*) NEG mg/dL    Specific Gravity Urine 1.026  1.003 - 1.035    Blood Urine Trace (*) NEG    pH Urine 5.0  5.0 - 7.0 pH    Protein Albumin Urine 10 (*) NEG mg/dL    Urobilinogen mg/dL Normal  0.0 - 2.0 mg/dL    Nitrite Urine Negative  NEG    Leukocyte Esterase Urine Negative  NEG    Source Midstream Urine      WBC Urine 1  0 - 2 /HPF    RBC Urine 4 (*) 0 - 2 /HPF    Squamous Epithelial /HPF Urine <1  0 - 1 /HPF    Mucous Urine Present (*) NEG /LPF    Hyaline  Casts 3 (*) 0 - 2 /LPF    Calcium Oxalate Moderate (*) NEG /HPF   COMPLEMENT C3       Component Value Range    Complement C3 143  76 - 169 mg/dL   COMPLEMENT C4       Component Value Range    Complement C4 31  15 - 50 mg/dL   HEPATITIS C ANTIBODY       Component Value Range    Hepatitis C Antibody Negative  NEG   CARDIOLIPIN ANTIBODY IGG AND IGM       Component Value Range    Cardiolipin IgG Marline    0 - 15.0 GPL    Value: <15.0      Interpretation:  Negative    Cardiolipin IgM Marline    0 - 12.5 MPL    Value: <12.5      Interpretation:  Negative   LUPUS PANEL       Component Value Range    Lupus Result    NEG    Value: Negative      (Note)      COMMENTS:      The INR is normal.      APTT is normal.  1:2 Mix is not indicated.      DRVVT Screen is normal.      Thrombin time is normal.      NEGATIVE TEST; A LUPUS ANTICOAGULANT WAS NOT DETECTED IN THIS      SPECIMEN WITHIN THE LIMITS OF THE TESTING REPERTOIRE.      If the clinical picture is strongly suggestive of an antiphospholipid      syndrome, recommend anticardiolipin and beta-2-glycoprotein (IgG and      IgM) antibody tests.      Leela Franks M.D.  [phone]      5/2/2013            INR =  0.93 N = 0.86-1.14  (No additional charge)      TT = 15.7 N = 13.0-19.0 sec  (No additional charge)            APTT'S:    Seconds      Reagent =  Stago LA      Normal  =  38      Patient  =  34      1:2 Mix  =  N/A      Reference =  31-43             DILUTE MADELINE VIPER VENOM TEST:      DRVVT Screen Ratio = 0.76 Normal = <1.21         IMMUNOGLOBULIN G SUBCLASSES       Component Value Range    IGG 1030  695 - 1620 mg/dL    IgG1 488  300 - 856 mg/dL    IgG2 325  158 - 761 mg/dL    IgG3 47  24 - 192 mg/dL    IgG4 18  11 - 86 mg/dL   SUNNY ANTIBODY PANEL       Component Value Range    Ribonucleic Protein IgG Antibody 0      Smith Antibody IgG 1      SSA (RO) Antibody IgG 4      SSB (LA) Antibody IgG 0      Scleroderma Antibody IgG 0     BETA 2 GLYCOPROTEIN ANTIBODIES IGG IGM        Component Value Range    Beta-2-Glycopro IgG 1      Beta-2-Glycopro IgM 5     CYCLIC CIT PEPT IGG       Component Value Range    Cyclic Cit Pept IgG/IgA    <20 UNITS    Value: <20      Interpretation:  Negative   DNA DOUBLE STRANDED ANTIBODIES       Component Value Range    DNA-ds    0 - 29 IU/mL    Value: <15      Interpretation:  Negative       CT CHEST PULMONARY EMBOLISM W CONTRAST 5/20/2015 4:57 PM  HISTORY: Pain. SOB. Elevated d-dimer.   TECHNIQUE: 65 mL Isovue 370. Axial images with coronal  reconstructions.  COMPARISON: None.  FINDINGS: Calcified granuloma with surrounding scarring in the  posterolateral left upper lobe. Triangular shaped opacity at the right  lung base in the lateral right middle lobe could represent some  scarring, atelectasis or infiltrate. There is also some scarring or  atelectasis in the posteromedial right lung base. Lungs otherwise  clear.  The pulmonary arteries are well opacified. No CT evidence for acute  pulmonary embolus. No aortic dissection.  Diffuse fatty infiltration of the liver.  IMPRESSION  IMPRESSION:   1. No pulmonary embolus identified.  2. Small focus of atelectasis, infiltrate or scarring in the lateral  right middle lobe.  3. Old granulomatous disease.  4. Otherwise negative.  SILVERIO VAZQUEZ MD    Copath Report      Patient Name: FAVIO MARTINEZ   MR#: [id number]   Specimen #: [id number]   Collected: 3/15/2016   Received: 3/15/2016   Reported: 3/17/2016 13:33   Ordering Phy(s): PARVEEN ENRIQUEZ     ORIGINAL REPORT FOLLOWS ADDENDUM  ADDENDUM     TO ORIGINAL REPORT   Status: Signed Out   Date Ordered:3/18/2016   Date Complete:3/18/2016   Date Reported:3/18/2016 12:06   Signed Out By: Marianne Godfrey MD     INTERPRETATION:   This addendum is done to incorporate the results of fungal (GMS) stains   done on the specimen.  Specimen is negative for fungal organisms.  The   original final diagnosis remains unchanged.     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  Negative for malignancy(Please   see microscopic description)     Electronically signed out by:     Marianne Godfrey MD     CLINICAL HISTORY:   right orbital mass     GROSS:   The specimen is received labeled \"right orbital biopsy\" and consists of   red-tan nodule measuring 1.5 x 0.9 x 0.6 cm with one smooth side and   opposite rough side consistent with periosteum.  The specimen is   bisected revealing homogenous pale tan fleshy cut surface.  Touch   preparations are prepared, air dried and fixed, portion of specimen is   submitted in RPMI for possible lymphoma workup [facility],   ([facility], [place], [place] ).  The remainder is entirely submitted.   (Dictated by: Marianne Godfrey MD 3/15/2016 04:45 PM)     MICROSCOPIC:     Specimen consists of portion of lacrimal gland with acute and chronic   inflammation.  Focal area of microabscess formation associated with   small areas of necrosis are also present.  Inflammation is seen   extending to the periorbital adipose tissue forming panniculitis.   Specimen is negative for malignancy.  Samples sent for immunophenotyping   to [facility], ([facility], [place], [place] ) reveals no evidence   of monoclonality or aberrant antigen expression.  A GMS (fungal) stain   is pending and results will be reported as an addendum.     CPT Codes:   A: 21420-TR3, 41916-JPAYY, SOH, 92337-TFQGB, 86340-RBJN     TESTING LAB LOCATION:   [address]  [address] [phone]     COLLECTION SITE:   Client: [facility]   Location: [facility] (S)            Component      Latest Ref Rng 4/29/2016   Nucleated RBCs      0 /100 0   Absolute Neutrophil      1.6 - 8.3 10e9/L 8.9 (H)   Absolute " Lymphocytes      0.8 - 5.3 10e9/L 3.2   Absolute Monocytes      0.0 - 1.3 10e9/L 0.8   Absolute Eosinophils      0.0 - 0.7 10e9/L 0.2   Absolute Basophils      0.0 - 0.2 10e9/L 0.1   Abs Immature Granulocytes      0 - 0.4 10e9/L 0.1   Absolute Nucleated RBC       0.0   IGG      695 - 1620 mg/dL 836   IgG1      300 - 856 mg/dL 280 (L)   IgG2      158 - 761 mg/dL 277   IgG3      24 - 192 mg/dL 35   IgG4      11 - 86 mg/dL 16   RNP Antibody IgG      0.0 - 0.9 AI <0.2 . . .   Smith SUNNY Antibody IgG      0.0 - 0.9 AI <0.2 . . .   SSA (Ro) (SUNNY) Antibody, IgG      0.0 - 0.9 AI <0.2 . . .   SSB (La) (SUNNY) Antibody, IgG      0.0 - 0.9 AI <0.2 . . .   Scleroderma Antibody Scl-70 SUNNY IgG      0.0 - 0.9 AI <0.2 . . .   Cholesterol      <200 mg/dL 154   Triglycerides      <150 mg/dL 220 (H)   HDL Cholesterol      >49 mg/dL 42 (L)   LDL Cholesterol Calculated      <100 mg/dL 67   Non HDL Cholesterol      <130 mg/dL 111   M Tuberculosis Result      NEG Negative   M Tuberculosis Antigen Value       0.00   Albumin      3.4 - 5.0 g/dL 4.3   ALT      0 - 50 U/L 30   AST      0 - 45 U/L 10   Complement C3      76 - 169 mg/dL 157   Complement C4      15 - 50 mg/dL 32   CRP Inflammation      0.0 - 8.0 mg/L <2.9   Sed Rate      0 - 20 mm/h 2   DNA-ds      <10 IU/mL 1   Cyclic Citrullinated Peptide Antibody, IgG      <7 U/mL 1   Rheumatoid Factor      <20 IU/mL <20   Proteinase 3 Antibody IgG      0.0 - 0.9 AI <0.2 . . .   Myeloperoxidase Antibody IgG      0.0 - 0.9 AI 2.5 (H)   Vitamin D Deficiency screening      20 - 75 ug/L 24   Hemoglobin A1C      4.3 - 6.0 % 7.9 (H)   ALLI by IFA IgG       1:40 (A) . . .     Component      Latest Ref Rng & Units 1/16/2021   WBC      4.0 - 11.0 10e9/L    RBC Count      3.8 - 5.2 10e12/L    Hemoglobin      11.7 - 15.7 g/dL    Hematocrit      35.0 - 47.0 %    MCV      78 - 100 fl    MCH      26.5 - 33.0 pg    MCHC      31.5 - 36.5 g/dL    RDW      10.0 - 15.0 %    Platelet Count      150 - 450 10e9/L     Diff Method          % Neutrophils      %    % Lymphocytes      %    % Monocytes      %    % Eosinophils      %    % Basophils      %    % Immature Granulocytes      %    Nucleated RBCs      0 /100    Absolute Neutrophil      1.6 - 8.3 10e9/L    Absolute Lymphocytes      0.8 - 5.3 10e9/L    Absolute Monocytes      0.0 - 1.3 10e9/L    Absolute Eosinophils      0.0 - 0.7 10e9/L    Absolute Basophils      0.0 - 0.2 10e9/L    Abs Immature Granulocytes      0 - 0.4 10e9/L    Absolute Nucleated RBC          IGG      610 - 1,616 mg/dL    IGA      84 - 499 mg/dL    IGM      35 - 242 mg/dL    Creatinine      0.52 - 1.04 mg/dL    GFR Estimate      >60 mL/min/1.73:m2    GFR Estimate If Black      >60 mL/min/1.73:m2    COVID-19 Virus PCR to [facility] - Source       Nasopharyngeal   COVID-19 Virus PCR to [facility] - Result       Not Detected   Neutrophil Cytoplasmic Antibody      <1:10 titer    Neutrophil Cytoplasmic Antibody Pattern          CD19 B Cells      6 - 27 %    Absolute CD19      107 - 698 cells/uL    Myeloperoxidase Antibody IgG      0.0 - 0.9 AI    Proteinase 3 Antibody IgG      0.0 - 0.9 AI    Vitamin D Deficiency screening      20 - 75 ug/L    Sed Rate      0 - 30 mm/h    CRP Inflammation      0.0 - 8.0 mg/L    Albumin      3.4 - 5.0 g/dL    ALT      0 - 50 U/L    AST      0 - 45 U/L      Component      Latest Ref Rng & Units 5/7/2021   WBC      4.0 - 11.0 10e9/L 8.9   RBC Count      3.8 - 5.2 10e12/L 4.89   Hemoglobin      11.7 - 15.7 g/dL 12.7   Hematocrit      35.0 - 47.0 % 39.7   MCV      78 - 100 fl 81   MCH      26.5 - 33.0 pg 26.0 (L)   MCHC      31.5 - 36.5 g/dL 32.0   RDW      10.0 - 15.0 % 13.7   Platelet Count      150 - 450 10e9/L 336   Diff Method       Automated Method   % Neutrophils      % 65.3   % Lymphocytes      % 20.7   % Monocytes      % 7.8   % Eosinophils      % 5.3   % Basophils      % 0.7   % Immature Granulocytes      % 0.2   Nucleated RBCs      0 /100 0   Absolute Neutrophil      1.6 - 8.3  10e9/L 5.8   Absolute Lymphocytes      0.8 - 5.3 10e9/L 1.8   Absolute Monocytes      0.0 - 1.3 10e9/L 0.7   Absolute Eosinophils      0.0 - 0.7 10e9/L 0.5   Absolute Basophils      0.0 - 0.2 10e9/L 0.1   Abs Immature Granulocytes      0 - 0.4 10e9/L 0.0   Absolute Nucleated RBC       0.0   IGG      610 - 1,616 mg/dL 649   IGA      84 - 499 mg/dL 199   IGM      35 - 242 mg/dL 36   Creatinine      0.52 - 1.04 mg/dL 0.96   GFR Estimate      >60 mL/min/1.73:m2 66   GFR Estimate If Black      >60 mL/min/1.73:m2 77   COVID-19 Virus PCR to [facility] - Source          COVID-19 Virus PCR to [facility] - Result          Neutrophil Cytoplasmic Antibody      <1:10 titer <1:10   Neutrophil Cytoplasmic Antibody Pattern       The ANCA IFA is <1:10.  No further testing will be performed.   CD19 B Cells      6 - 27 % <1 (L)   Absolute CD19      107 - 698 cells/uL <1 (L)   Myeloperoxidase Antibody IgG      0.0 - 0.9 AI 1.1 (H)   Proteinase 3 Antibody IgG      0.0 - 0.9 AI <0.2   Vitamin D Deficiency screening      20 - 75 ug/L 41   Sed Rate      0 - 30 mm/h 9   CRP Inflammation      0.0 - 8.0 mg/L <2.9   Albumin      3.4 - 5.0 g/dL 4.1   ALT      0 - 50 U/L 28   AST      0 - 45 U/L 18     ROS: A comprehensive ROS was done. Positives are per HPI.          HISTORY REVIEW:  Past Medical History:   Diagnosis Date     Abnormal glandular Papanicolaou smear of cervix 1992     Allergic rhinitis     Allergy, airborne subst     Arthritis      ASCVD (arteriosclerotic cardiovascular disease)      Chronic pain      Chronic pancreatitis (H)     idiopathic, Tx: PPI, antioxidants, pancreatic enzymes     Common migraine      Coronary artery disease      Costochondritis      Difficulty in walking(719.7)      Dyspnea on exertion      Ectasia, mammary duct     [facility], persistent nipple discharge     Elevated fasting glucose      Gastro-oesophageal reflux disease      Granulomatosis with polyangiitis (H)      Hernia      History of angina       Hyperlipidemia LDL goal < 100      Hypertension goal BP (blood pressure) < 140/90     Essential hypertension     Iron deficiency anemia      Ischemic cardiomyopathy      Menorrhagia      Migraine headaches      Mild persistent asthma      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 Matias, neg w/u, Rx'd w/prednisone     Ventral hernia, unspecified, without mention of obstruction or gangrene 2012     Past Surgical History:   Procedure Laterality Date     C ECHO HEART XTHORACIC,COMPLETE, W/O DOPPLER  04    [facility] WNL, EF 60%     C/SECTION, LOW TRANSVERSE           CARDIAC SURGERY      cardiac stent- recent in 16; 4 other stents     DILATION AND CURETTAGE N/A 2016    Procedure: DILATION AND CURETTAGE;  Surgeon: Nahed Butler MD;  Location: [facility]     HC UGI ENDOSCOPY W EUS  08    Dr. Pastrana, possible chronic pancreatitis, fatty liver     HERNIA REPAIR  2012    done at [facility] [place]     INSERT INTRAUTERINE DEVICE N/A 2016    Procedure: INSERT INTRAUTERINE DEVICE;  Surgeon: Nahed Butler MD;  Location: [facility]     INT UTERINE FIBRIOD EMBOLIZATION  10/29/2014     LAPAROSCOPIC CHOLECYSTECTOMY  08    Dr. Arnol GRUBBS TX, CERVICAL      s/p LEEP     ORBITOTOMY Right 3/15/2016    Procedure: ORBITOTOMY;  Surgeon: Ger  Myron Montalvo MD;  Location: [facility]     ORBITOTOMY Right 2017    Procedure: ORBITOTOMY;  RIGHT ORBITOTOMY AND BIOPSY;  Surgeon: Charis Holbrook MD;  Location: [facility]     REPAIR PTOSIS Right 2017    Procedure: REPAIR PTOSIS;  RIGHT UPPER LID PTOSIS REPAIR;  Surgeon: Myron Cyr MD;  Location: [facility]     UPPER GI ENDOSCOPY  10/21/08    mild gastritis, Dr. Hidalgo     Family History   Problem Relation Age of Onset     Heart Disease Father 50        heart attack     Cerebrovascular Disease Father      Diabetes Father      Hypertension Father      Depression Father      C.A.D. Father      Hypertension Mother      Arthritis Mother      Heart Disease Mother         long qt syndrome     Thyroid Disease Mother      C.A.D. Mother      Heart Disease Brother 15        MI at 15, 16.      Diabetes Maternal Uncle      Hypertension Maternal Aunt      Hypertension Maternal Uncle      Arthritis Brother         he passed away, had severe arthritis at age 11     Arthritis Maternal Uncle      Eye Disorder Maternal Uncle         cataracts     Neurologic Disorder Sister         migraines     Neurologic Disorder Sister         migraines     Respiratory Son         asthma     Cerebrovascular Disease Maternal Uncle      C.A.D. Brother      Family History Negative Other         neg for RA, SLE     Unknown/Adopted No family hx of         unknown neurological issues in her family, mother was adopted     Skin Cancer No family hx of         No known family hx of skin cancer     Social History     Socioeconomic History     Marital status: Single     Spouse name: Not on file     Number of children: 1     Years of education: Not on file     Highest education level: Not on file   Occupational History     Employer: NONE    Tobacco Use     Smoking status: Current Every Day Smoker     Packs/day: 0.20     Years: 1.00     Pack years: 0.20     Types: Cigarettes     Last attempt to quit: 2016     Years since quittin.8      Smokeless tobacco: Never Used   Substance and Sexual Activity     Alcohol use: No     Alcohol/week: 0.0 standard drinks     Drug use: No     Sexual activity: Not Currently   Other Topics Concern     Parent/sibling w/ CABG, MI or angioplasty before 65F 55M? Yes   Social History Narrative    Balanced Diet - sometimes    Osteoporosis Prevention Measures - Dairy servings per day: 2 servings weekly    Regular Exercise -  Yes Describe walking 4 times a week    Dental Exam - NO    Seatbelts used - Yes    Self Breast Exam - Yes    Abuse: Current or Past (Physical, Sexual or Emotional)- No    Do you have any concerns about STD's -  No    Do you feel safe in your environment - No    Guns stored in the home - No    Sunscreen used - Yes    Lipids -  YES - Date: 053102    Glucose -  YES - Date: 012804    Eye Exam - YES - Date: one year ago    Colon Cancer Screening - No    Hemoccults - NO    Pap Test -  YES - Date: 070904, remote history of LEEP    Mammography - YES - Date: last spring, would like to discuss, needs a referral to [facility]    DEXA - NO    Immunizations reviewed and up to date - Yes, last td given in 1997 or 1998     Social Determinants of Health     Financial Resource Strain: Not on file   Food Insecurity: Not on file   Transportation Needs: Not on file   Physical Activity: Not on file   Stress: Not on file   Social Connections: Not on file   Intimate Partner Violence: Not on file   Housing Stability: Not on file     Patient Active Problem List   Diagnosis     Seasonal affective disorder (H)     Allergic rhinitis     PCOS (polycystic ovarian syndrome)     Moderate persistent asthma     Chronic pancreatitis (H)     Hypertension goal BP (blood pressure) < 140/90     Common migraine     NSTEMI (non-ST elevated myocardial infarction) (H)     Hyperlipidemia LDL goal <70     ASCVD (arteriosclerotic cardiovascular disease)     GERD (gastroesophageal reflux disease)     Ischemic cardiomyopathy      Hypertensive heart disease     Uterine leiomyoma     Iron deficiency anemia     Costochondritis     Vitamin D deficiency     Breast cancer screening     Spinal stenosis in cervical region     Fibromyalgia     Seronegative rheumatoid arthritis (H)     Type 2 diabetes, HbA1C goal < 8% (H)     Type 2 diabetes mellitus with other specified complication (H)     Hyperlipidemia associated with type 2 diabetes mellitus (H)     Hypertension associated with diabetes (H)     Overweight with body mass index (BMI) 25.0-29.9     Tobacco use disorder     Telogen effluvium     CAD S/P percutaneous coronary angioplasty     Status post coronary angiogram     ANCA-associated vasculitis (H)     Wegener's vasculitis     Type 1 diabetes mellitus with complications (H)     Chest discomfort     Urinary frequency     Abdominal pain, epigastric     Hypokalemia     Leukocytosis, unspecified type     Acute pancreatitis, unspecified complication status, unspecified pancreatitis type       Pregnancy Hx: She is . All misscarriages were in first trimester. H/o OC use in the past. 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    PMHx, FHx, SHx were reviewed, unchanged.      Outpatient Encounter Medications as of 2021   Medication Sig Dispense Refill     acetaminophen (TYLENOL) 325 MG tablet Take 1-2 tablets (325-650 mg) by mouth every 6 hours as needed for pain (headache) 250 tablet 4     albuterol (2.5 MG/3ML) 0.083% neb solution INL 1 VIAL VIA NEBULIZATION Q 4 TO 6 HOURS PRN  1     albuterol (PROAIR HFA, PROVENTIL HFA, VENTOLIN HFA) 108 (90 BASE) MCG/ACT inhaler Inhale 2 puffs into the lungs every 6 hours as needed for shortness of breath / dyspnea or wheezing 3 Inhaler 1     aspirin (ASPIRIN LOW DOSE) 81 MG EC tablet Take 1 tablet (81 mg) by mouth daily . 30-day refills only. 30 tablet 11     blood glucose monitoring (NO BRAND SPECIFIED) meter device kit Use to test blood sugar 4 X times daily or as directed. (Patient  taking differently: 1 kit Use to test blood sugar 4 X times daily or as directed.) 1 kit 0     blood glucose monitoring (NO BRAND SPECIFIED) test strip 1 strip by In Vitro route 4 times daily Test as directed. Please dispense three months and three months of refills. Thank you. (Patient taking differently: 1 strip by In Vitro route 4 times daily Test as directed. Please dispense three months and three months of refills. Thank you.) 360 each 3     Blood Pressure Monitor KIT 1 each daily Monitor home blood pressure as instructed by physician.  Dispense [place] blood pressure kit. 1 kit 0     calcium carbonate (TUMS) 500 MG chewable tablet Take 3-4 tablets (1,500-2,000 mg) by mouth daily as needed 90 tablet 3     clopidogrel (PLAVIX) 75 MG tablet Take 1 tablet (75 mg) by mouth daily . 30-day refills only. 30 tablet 11     cyclobenzaprine (FLEXERIL) 10 MG tablet Take 1 tablet (10 mg) by mouth 2 times daily as needed for muscle spasms 60 tablet 3     diphenhydrAMINE (BENADRYL) 25 MG capsule Take 1 capsule (25 mg) by mouth nightly as needed for itching or allergies 30 capsule 2     EPIPEN 2-RIKY 0.3 MG/0.3ML injection INJECT 0.3 MG INTO THE MUSCLE PRF ANAPHYALAXIS  0     famotidine (PEPCID) 20 MG tablet Take 1 tablet (20 mg) by mouth 2 times daily as needed (abdominal pain) 20 tablet 0     fexofenadine (ALLEGRA) 180 MG tablet Take 1 tablet by mouth daily as needed. 90 tablet 3     folic acid (FOLVITE) 1 MG tablet Take 1 tablet by mouth daily 90 tablet 3     insulin glargine (LANTUS PEN) 100 UNIT/ML pen Inject 58 Units Subcutaneous every morning       insulin pen needle (BD MARCK U/F) 32G X 4 MM USE DAILY OR AS DIRECTED 300 each 3     lisinopril-hydrochlorothiazide (ZESTORETIC) 20-25 MG tablet Take 1 tablet by mouth daily . 30-day refills only. 30 tablet 11     magnesium 250 MG tablet TAKE 1 TABLET(250 MG) BY MOUTH TWICE DAILY 60 tablet 1     metFORMIN (GLUCOPHAGE-XR) 500 MG 24 hr tablet Take 2,000 mg by mouth daily (with  dinner)       metoprolol succinate ER (TOPROL-XL) 100 MG 24 hr tablet Take 1 tablet (100 mg) by mouth daily . 30-day refills only. 30 tablet 11     Multiple Vitamin (DAILY-GERALD) TABS Take 1 tablet by mouth daily 90 tablet 0     nitroGLYcerin (NITROSTAT) 0.4 MG sublingual tablet Place 1 tablet (0.4 mg) under the tongue every 5 minutes as needed for chest pain . After 3 doses, if pain persists call 911. 25 tablet 3     ondansetron (ZOFRAN-ODT) 4 MG ODT tab Take 1 tablet (4 mg) by mouth every 8 hours as needed for nausea 12 tablet 0     polyethylene glycol (MIRALAX) 17 GM/Dose powder Take 17 g by mouth daily 225 g 0     pravastatin (PRAVACHOL) 40 MG tablet Take 1 tablet (40 mg) by mouth daily . 30-day refills only. 30 tablet 11     sennosides (SENOKOT) 8.6 MG tablet 1-2 tabs a day as needed for constipation 60 tablet 1     spironolactone (ALDACTONE) 25 MG tablet Take 1 tablet (25 mg) by mouth daily . Take one additional 0.5 tab daily as needed for weight gain. 45 day refills. 45 tablet 11     traMADol (ULTRAM) 50 MG tablet TAKE 1 TABLET(50 MG) BY MOUTH EVERY 8 HOURS AS NEEDED FOR MODERATE PAIN 60 tablet 3     vitamin D2 (ERGOCALCIFEROL) 63451 units (1250 mcg) capsule Take 1 capsule (50,000 Units) by mouth once a week 12 capsule 0     COMPRESSION STOCKINGS 2 each daily Apply one 10-15 mmHg compression stocking to each lower extgmierty during the day and remove before bedtime. (Patient not taking: Reported on 10/18/2021) 4 each 2     dicyclomine (BENTYL) 20 MG tablet TAKE 1 TABLET(20 MG) BY MOUTH FOUR TIMES DAILY AS NEEDED. Pls schedule clinic appt for refills. (Patient not taking: Reported on 10/18/2021) 60 tablet 0     Ergocalciferol (VITAMIN D2) 50 MCG (2000 UT) TABS Take 2,000 Units by mouth daily (Patient not taking: Reported on 10/18/2021) 90 tablet 1     fluocinolone (SYNALAR) 0.01 % solution Apply topically daily as needed (Patient not taking: Reported on 10/18/2021)       fluocinonide (LIDEX) 0.05 % external  ointment Apply topically as needed (Patient not taking: Reported on 10/18/2021)       fluticasone-vilanterol (BREO ELLIPTA) 200-25 MCG/INH inhaler Inhale 1 puff into the lungs daily (Patient not taking: Reported on 10/18/2021)       hydroxychloroquine (PLAQUENIL) 200 MG tablet Take 2 tablets (400 mg) by mouth daily (Annual eye exam/plaquenil screening) (Patient not taking: Reported on 10/18/2021) 180 tablet 0     ketoconazole (NIZORAL) 2 % shampoo Apply topically daily as needed (Patient not taking: Reported on 10/18/2021)       montelukast (SINGULAIR) 10 MG tablet Take 1 tablet (10 mg) by mouth At Bedtime (Patient not taking: Reported on 10/18/2021) 30 tablet 1     vitamin D3 (CHOLECALCIFEROL) 50 mcg (2000 units) tablet Take 1 tablet (50 mcg) by mouth daily (Patient not taking: Reported on 10/18/2021) 90 tablet 1     No facility-administered encounter medications on file as of 12/16/2021.       Allergies   Allergen Reactions     Amoxicillin-Pot Clavulanate      Augmentin      Unknown possible hives and edema     Azithromycin      Diatrizoate Other (See Comments)     Pt wants this listed because she is allergic to shellfish      Imitrex [Sumatriptan]      Severe face/neck/chest tightness and flushing side effects      Penicillins Hives     Unknown      Pork Allergy      Stomach pain, cramping, diarrhea, itching, nausea and headaches     Shellfish Allergy Hives and Swelling     Sulfa Drugs Hives and Swelling     Zithromax [Azithromycin Dihydrate] Swelling     Unknown          Ph.E:    BP (!) 134/92   Pulse 69   Wt 79.3 kg (174 lb 14.4 oz)   SpO2 96%   BMI 30.98 kg/m    GA: NAD, pleasant  HEENT: nl conj, sclera, EOMI. No campos-orbital edema  Chest: CTAB  CV: no M/R/G, RRR  Abdomen: soft, NT  MSK: painful ROM of hands on making fist  Skin: no rash  Neuro: non focal  Psych: nl affect        Assessment/ plan:    1-Seropositive non-erosive RA (arthritis, AM stiffness, high CRP, RF 26 but re-check neg 3/2015 on HCQ) Dx  5/2013, FMS, new pleuritic CP 3/20-15-5/2015 resolved on steroids. She is on  mg bid since 5/2014. She tolerates it well and it's helping some but not enough to control all her pain. Continues having flare ups of joint pain, could be GPA related. Some pain is due to FMS.    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 Prednisone was not a good option for her given weight gain, diabetes. She tried and did not tolerate MTX, AZA.    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 Repeat MPO was positive in 6/2017.    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 Shah and Dr. Valdez at [facility]. 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     She is on rituximab since 12/12/2018 with great response, minor allergic reaction which could be managed by pre-meds and extra steroid dose.      Stable GPA but rituximab does not last 6 months. Has lots of joint pain, repeat orbit CT showed improvement. According to ACR guideline could give every 4-6 months. Stable labs. We could move up next rituximab from 4/2022 to 2/2022 if joint pain gets worse.    Had last rituximab 1 gram one dose only on 10/18/2021.      My impression is that her arthralgias are a combination of RA/ IA sec GPA, fibromyalgia and chronic pain.     2-Fad pads. She was seen by endocrinology and cushing was ruled out in 4/2014. Was advised to do f/u for enlarged thyroid/thyroid nodules.    3-Hair loss. F/U with dermatology    4-Chronic pain. F/U with pain clinic    5-Vit D deficiency. Was replaced with vit D 50, 000 units po qwk x 12 wk then 2000 units every day    6-Discussed COVID vaccination, timing with rituximab to get max benefit, she will consider. Discussed monoclonal Ab.    7-?HCQ toxicity on OCT 7/2021, now off HCQ, could explain increased  arthralgia      Today's plan:    Rituximab 1 gram in 4/2022, but will move it up to 2/2022 if joint pain gets worse    Labs in 2/2022    Consider zanaflex    [email or web address]    Return in 3-4 months (in person)        Majo Mckinnon MD

## 2021-12-16 NOTE — PATIENT INSTRUCTIONS
Rituximab 1 gram in 4/2022, but will move it up to 2/2022 if joint pain gets worse    Labs in 2/2022    Consider zanaflex    [email or web address]    Return in 3-4 months (in person)

## 2021-12-16 NOTE — PROGRESS NOTES
Rheumatology F/U In Person Visit Note    Last visit note: 8/20/2021    Today's visit date: 12/16/2021    Reason for in person visit: F/U GPA      HPI     Ms. 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 She is on HCQ since 5/2014. On rituximab since 12/20218 with great response. Previously tried and did not tolerate MTX, AZA.      She had lateral orbitotomy and debulking orbital mass right eye on 9/26/18 with Dr. Shah and Dr. Valdez at [facility]. 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 There were no complications according to the op note.      4/21/2021: Had last rituximab 1 gram on 1/19, 2/2/2021. Reports rituximab starts to wear off earlier, has more sx this time. Eye swelling is intermittent. Gets indigestion/ GERD sx with constipation after the infusion.    She reports having asthma, swelling of neck, arms. She thinks that it could be from her vasculitis. She is worried about going down on rituximab dose.     Otherwise unchanged sx, no SOB or hemoptysis or persistent cough, or     Somedays her knees and hips hurt a lot, feel like bone on bone in her knees, especially on changing position.    5/10/2021:      Joint ache, knees hurt, R elbow hurts, R arm hurts, R hand hurts. Has lots of swelling in her joints especially hands.    Depending on weather, joints jhurt, sometimes pain is 7/10.    Has problem with taking stairs and balance.    Gets off balance.     R eye gets tinglin, swelling.    Gets out of breath, gets worse with seasonal allergies.    Over past month and half, took nitro more, like 8 times.    No hemooptysis, no hematuria.    Has allergies.      8/20/2021: Janine has pain over arms, knees. Some days, feel stiff all day long. Some days can't do stairs. Hard to tell if there is swelling as has more water retention during summer.    Some days, eye swells up like today. No fevers,  SOB, CP or cough.    Has rosacea but some days wakes up with heat rash over sun. Her face is peeling.    Sometimes can't lift things or grab things with pain from elbow to hands. Has shoulder and neck pain but this forearm pain is new.    Stopped HCQ in mid July due to concern for potential HCQ toxicity on OCT, her eye exam with Dr. Vernon has been re-scheduled and upcoming on 9/14 to address HCQ toxicity, pain is not worse off HCQ.    Not COVID vaccinated but is cautious.      Today 12/16/2021: Janine presents for follow up. Reports ESOB with cold, has ongoing joint pain. R eye pain is intermittent.    Reports headaches after rituximab 1 gram on 10/18/2021.    Last week, her R knee was hurting.      Component      Latest Ref Rng 2/28/2013 2/28/2013          12:14 PM 12:14 PM   WBC      4.0 - 11.0 10e9/L     RBC Count      3.8 - 5.2 10e12/L     Hemoglobin      11.7 - 15.7 g/dL     Hematocrit      35.0 - 47.0 %     MCV      78 - 100 fl     MCH      26.5 - 33.0 pg     MCHC      31.5 - 36.5 g/dL     RDW      10.0 - 15.0 %     Platelet Count      150 - 450 10e9/L     Specimen Description           Lyme Screen IgG and IgM           Vitamin D Deficiency screening      30 - 75 ug/L     Ferritin      10 - 300 ng/mL     Sed Rate      0 - 20 mm/h     ALLI Screen by EIA      <1.0     Rheumatoid Factor      0 - 14 IU/mL     CK Total      30 - 225 U/L  78   Uric Acid      2.5 - 7.5 mg/dL 6.7      Component      Latest Ref Rng 2/28/2013          12:14 PM   WBC      4.0 - 11.0 10e9/L    RBC Count      3.8 - 5.2 10e12/L    Hemoglobin      11.7 - 15.7 g/dL    Hematocrit      35.0 - 47.0 %    MCV      78 - 100 fl    MCH      26.5 - 33.0 pg    MCHC      31.5 - 36.5 g/dL    RDW      10.0 - 15.0 %    Platelet Count      150 - 450 10e9/L    Specimen Description       Serum   Lyme Screen IgG and IgM       Test value: <0.75....Interpretation: Negative....If you highly suspect Lyme . . .   Vitamin D Deficiency screening      30 - 75 ug/L     Ferritin      10 - 300 ng/mL    Sed Rate      0 - 20 mm/h    ALLI Screen by EIA      <1.0    Rheumatoid Factor      0 - 14 IU/mL    CK Total      30 - 225 U/L    Uric Acid      2.5 - 7.5 mg/dL      Component      Latest Ref Rng 2/28/2013 2/28/2013 2/28/2013 2/28/2013          12:14 PM 12:14 PM 12:14 PM 12:14 PM   WBC      4.0 - 11.0 10e9/L       RBC Count      3.8 - 5.2 10e12/L       Hemoglobin      11.7 - 15.7 g/dL       Hematocrit      35.0 - 47.0 %       MCV      78 - 100 fl       MCH      26.5 - 33.0 pg       MCHC      31.5 - 36.5 g/dL       RDW      10.0 - 15.0 %       Platelet Count      150 - 450 10e9/L       Specimen Description             Lyme Screen IgG and IgM             Vitamin D Deficiency screening      30 - 75 ug/L       Ferritin      10 - 300 ng/mL    10   Sed Rate      0 - 20 mm/h   23 (H)    ALLI Screen by EIA      <1.0  <1.0 . . .     Rheumatoid Factor      0 - 14 IU/mL 26 (H)      CK Total      30 - 225 U/L       Uric Acid      2.5 - 7.5 mg/dL         5/2013  CBC WITH PLATELETS DIFFERENTIAL       Component Value Range    WBC 11.3 (*) 4.0 - 11.0 10e9/L    RBC Count 4.56  3.8 - 5.2 10e12/L    Hemoglobin 11.1 (*) 11.7 - 15.7 g/dL    Hematocrit 34.3 (*) 35.0 - 47.0 %    MCV 75 (*) 78 - 100 fl    MCH 24.3 (*) 26.5 - 33.0 pg    MCHC 32.4  31.5 - 36.5 g/dL    RDW 16.1 (*) 10.0 - 15.0 %    Platelet Count 315  150 - 450 10e9/L    Diff Method Automated Method      % Neutrophils 71.6  40 - 75 %    % Lymphocytes 20.9  20 - 48 %    % Monocytes 4.3  0 - 12 %    % Eosinophils 2.8  0 - 6 %    % Basophils 0.2  0 - 2 %    % Immature Granulocytes 0.2  0 - 0.4 %    Absolute Neutrophil 8.1  1.6 - 8.3 10e9/L    Absolute Lymphocytes 2.4  0.8 - 5.3 10e9/L    Absolute Monoctyes 0.5  0.0 - 1.3 10e9/L    Absolute Eosinophils 0.3  0.0 - 0.7 10e9/L    Absolute Basophils 0.0  0.0 - 0.2 10e9/L    Abs Immature Granulocytes 0.0  0 - 0.03 10e9/L   AMYLASE       Component Value Range    Amylase 103  30 - 110 U/L   COMPREHENSIVE  METABOLIC PANEL       Component Value Range    Sodium 144  133 - 144 mmol/L    Potassium 3.8  3.4 - 5.3 mmol/L    Chloride 105  94 - 109 mmol/L    Carbon Dioxide 23  20 - 32 mmol/L    Anion Gap 17  6 - 17 mmol/L    Glucose 173 (*) 60 - 99 mg/dL    Urea Nitrogen 13  5 - 24 mg/dL    Creatinine 0.83  0.52 - 1.04 mg/dL    GFR Estimate 74  >60 mL/min/1.7m2    GFR Estimate If Black 90  >60 mL/min/1.7m2    Calcium 9.7  8.5 - 10.4 mg/dL    Bilirubin Total 0.4  0.2 - 1.3 mg/dL    Albumin 4.3  3.9 - 5.1 g/dL    Protein Total 7.8  6.8 - 8.8 g/dL    Alkaline Phosphatase 66  40 - 150 U/L    ALT 36  0 - 50 U/L    AST 28  0 - 45 U/L   CK TOTAL       Component Value Range    CK Total 66  30 - 225 U/L   CRP INFLAMMATION       Component Value Range    CRP Inflammation 10.4 (*) 0.0 - 8.0 mg/L   LIPASE       Component Value Range    Lipase 353 (*) 20 - 250 U/L   ERYTHROCYTE SEDIMENTATION RATE AUTO       Component Value Range    Sed Rate 26 (*) 0 - 20 mm/h   ROUTINE UA WITH MICROSCOPIC REFLEX TO CULTURE       Component Value Range    Color Urine Yellow      Appearance Urine Slightly Cloudy      Glucose Urine 30 (*) NEG mg/dL    Bilirubin Urine Negative  NEG    Ketones Urine 5 (*) NEG mg/dL    Specific Gravity Urine 1.026  1.003 - 1.035    Blood Urine Trace (*) NEG    pH Urine 5.0  5.0 - 7.0 pH    Protein Albumin Urine 10 (*) NEG mg/dL    Urobilinogen mg/dL Normal  0.0 - 2.0 mg/dL    Nitrite Urine Negative  NEG    Leukocyte Esterase Urine Negative  NEG    Source Midstream Urine      WBC Urine 1  0 - 2 /HPF    RBC Urine 4 (*) 0 - 2 /HPF    Squamous Epithelial /HPF Urine <1  0 - 1 /HPF    Mucous Urine Present (*) NEG /LPF    Hyaline Casts 3 (*) 0 - 2 /LPF    Calcium Oxalate Moderate (*) NEG /HPF   COMPLEMENT C3       Component Value Range    Complement C3 143  76 - 169 mg/dL   COMPLEMENT C4       Component Value Range    Complement C4 31  15 - 50 mg/dL   HEPATITIS C ANTIBODY       Component Value Range    Hepatitis C Antibody Negative  NEG    CARDIOLIPIN ANTIBODY IGG AND IGM       Component Value Range    Cardiolipin IgG Marline    0 - 15.0 GPL    Value: <15.0      Interpretation:  Negative    Cardiolipin IgM Marline    0 - 12.5 MPL    Value: <12.5      Interpretation:  Negative   LUPUS PANEL       Component Value Range    Lupus Result    NEG    Value: Negative      (Note)      COMMENTS:      The INR is normal.      APTT is normal.  1:2 Mix is not indicated.      DRVVT Screen is normal.      Thrombin time is normal.      NEGATIVE TEST; A LUPUS ANTICOAGULANT WAS NOT DETECTED IN THIS      SPECIMEN WITHIN THE LIMITS OF THE TESTING REPERTOIRE.      If the clinical picture is strongly suggestive of an antiphospholipid      syndrome, recommend anticardiolipin and beta-2-glycoprotein (IgG and      IgM) antibody tests.      Leela Franks M.D.  [phone]      5/2/2013            INR =  0.93 N = 0.86-1.14  (No additional charge)      TT = 15.7 N = 13.0-19.0 sec  (No additional charge)            APTT'S:    Seconds      Reagent =  Stago LA      Normal  =  38      Patient  =  34      1:2 Mix  =  N/A      Reference =  31-43             DILUTE MADELINE VIPER VENOM TEST:      DRVVT Screen Ratio = 0.76 Normal = <1.21         IMMUNOGLOBULIN G SUBCLASSES       Component Value Range    IGG 1030  695 - 1620 mg/dL    IgG1 488  300 - 856 mg/dL    IgG2 325  158 - 761 mg/dL    IgG3 47  24 - 192 mg/dL    IgG4 18  11 - 86 mg/dL   SUNNY ANTIBODY PANEL       Component Value Range    Ribonucleic Protein IgG Antibody 0      Smith Antibody IgG 1      SSA (RO) Antibody IgG 4      SSB (LA) Antibody IgG 0      Scleroderma Antibody IgG 0     BETA 2 GLYCOPROTEIN ANTIBODIES IGG IGM       Component Value Range    Beta-2-Glycopro IgG 1      Beta-2-Glycopro IgM 5     CYCLIC CIT PEPT IGG       Component Value Range    Cyclic Cit Pept IgG/IgA    <20 UNITS    Value: <20      Interpretation:  Negative   DNA DOUBLE STRANDED ANTIBODIES       Component Value Range    DNA-ds    0 - 29 IU/mL    Value:  <15      Interpretation:  Negative       CT CHEST PULMONARY EMBOLISM W CONTRAST 5/20/2015 4:57 PM  HISTORY: Pain. SOB. Elevated d-dimer.   TECHNIQUE: 65 mL Isovue 370. Axial images with coronal  reconstructions.  COMPARISON: None.  FINDINGS: Calcified granuloma with surrounding scarring in the  posterolateral left upper lobe. Triangular shaped opacity at the right  lung base in the lateral right middle lobe could represent some  scarring, atelectasis or infiltrate. There is also some scarring or  atelectasis in the posteromedial right lung base. Lungs otherwise  clear.  The pulmonary arteries are well opacified. No CT evidence for acute  pulmonary embolus. No aortic dissection.  Diffuse fatty infiltration of the liver.  IMPRESSION  IMPRESSION:   1. No pulmonary embolus identified.  2. Small focus of atelectasis, infiltrate or scarring in the lateral  right middle lobe.  3. Old granulomatous disease.  4. Otherwise negative.  SILVERIO VAZQUEZ MD    Copath Report      Patient Name: FAVIO MARTINEZ   MR#: [id number]   Specimen #: [id number]   Collected: 3/15/2016   Received: 3/15/2016   Reported: 3/17/2016 13:33   Ordering Phy(s): PARVEEN ENRIQUEZ     ORIGINAL REPORT FOLLOWS ADDENDUM  ADDENDUM     TO ORIGINAL REPORT   Status: Signed Out   Date Ordered:3/18/2016   Date Complete:3/18/2016   Date Reported:3/18/2016 12:06   Signed Out By: Marianne Godfrey MD     INTERPRETATION:   This addendum is done to incorporate the results of fungal (GMS) stains   done on the specimen.  Specimen is negative for fungal organisms.  The   original final diagnosis remains unchanged.     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  Negative for malignancy(Please   see microscopic description)     Electronically signed out  "by:     Marianne Godfrey MD     CLINICAL HISTORY:   right orbital mass     GROSS:   The specimen is received labeled \"right orbital biopsy\" and consists of   red-tan nodule measuring 1.5 x 0.9 x 0.6 cm with one smooth side and   opposite rough side consistent with periosteum.  The specimen is   bisected revealing homogenous pale tan fleshy cut surface.  Touch   preparations are prepared, air dried and fixed, portion of specimen is   submitted in RPMI for possible lymphoma workup [facility],   ([facility], [place], [place] ).  The remainder is entirely submitted.   (Dictated by: Marianne Godfrey MD 3/15/2016 04:45 PM)     MICROSCOPIC:     Specimen consists of portion of lacrimal gland with acute and chronic   inflammation.  Focal area of microabscess formation associated with   small areas of necrosis are also present.  Inflammation is seen   extending to the periorbital adipose tissue forming panniculitis.   Specimen is negative for malignancy.  Samples sent for immunophenotyping   to [facility], ([facility], [place], [place] ) reveals no evidence   of monoclonality or aberrant antigen expression.  A GMS (fungal) stain   is pending and results will be reported as an addendum.     CPT Codes:   A: 31620-LP9, 07200-VOLPQ, SOH, 64105-MYNOI, 68566-RXKK     TESTING LAB LOCATION:   [address]  [address] [phone]     COLLECTION SITE:   [facility]   [facility]            Component      Latest Ref Rng 4/29/2016   Nucleated RBCs      0 /100 0   Absolute Neutrophil      1.6 - 8.3 10e9/L 8.9 (H)   Absolute Lymphocytes      0.8 - 5.3 10e9/L 3.2   Absolute Monocytes      0.0 - 1.3 10e9/L 0.8   Absolute Eosinophils      0.0 - 0.7 10e9/L 0.2   Absolute Basophils      0.0 - 0.2 10e9/L 0.1   Abs Immature Granulocytes      0 - 0.4 10e9/L 0.1   Absolute Nucleated RBC       0.0   IGG      695 - 1620 mg/dL 836   IgG1      300 - 856 " mg/dL 280 (L)   IgG2      158 - 761 mg/dL 277   IgG3      24 - 192 mg/dL 35   IgG4      11 - 86 mg/dL 16   RNP Antibody IgG      0.0 - 0.9 AI <0.2 . . .   Smith SUNNY Antibody IgG      0.0 - 0.9 AI <0.2 . . .   SSA (Ro) (SUNNY) Antibody, IgG      0.0 - 0.9 AI <0.2 . . .   SSB (La) (SUNNY) Antibody, IgG      0.0 - 0.9 AI <0.2 . . .   Scleroderma Antibody Scl-70 SUNNY IgG      0.0 - 0.9 AI <0.2 . . .   Cholesterol      <200 mg/dL 154   Triglycerides      <150 mg/dL 220 (H)   HDL Cholesterol      >49 mg/dL 42 (L)   LDL Cholesterol Calculated      <100 mg/dL 67   Non HDL Cholesterol      <130 mg/dL 111   M Tuberculosis Result      NEG Negative   M Tuberculosis Antigen Value       0.00   Albumin      3.4 - 5.0 g/dL 4.3   ALT      0 - 50 U/L 30   AST      0 - 45 U/L 10   Complement C3      76 - 169 mg/dL 157   Complement C4      15 - 50 mg/dL 32   CRP Inflammation      0.0 - 8.0 mg/L <2.9   Sed Rate      0 - 20 mm/h 2   DNA-ds      <10 IU/mL 1   Cyclic Citrullinated Peptide Antibody, IgG      <7 U/mL 1   Rheumatoid Factor      <20 IU/mL <20   Proteinase 3 Antibody IgG      0.0 - 0.9 AI <0.2 . . .   Myeloperoxidase Antibody IgG      0.0 - 0.9 AI 2.5 (H)   Vitamin D Deficiency screening      20 - 75 ug/L 24   Hemoglobin A1C      4.3 - 6.0 % 7.9 (H)   ALLI by IFA IgG       1:40 (A) . . .     Component      Latest Ref Rng & Units 1/16/2021   WBC      4.0 - 11.0 10e9/L    RBC Count      3.8 - 5.2 10e12/L    Hemoglobin      11.7 - 15.7 g/dL    Hematocrit      35.0 - 47.0 %    MCV      78 - 100 fl    MCH      26.5 - 33.0 pg    MCHC      31.5 - 36.5 g/dL    RDW      10.0 - 15.0 %    Platelet Count      150 - 450 10e9/L    Diff Method          % Neutrophils      %    % Lymphocytes      %    % Monocytes      %    % Eosinophils      %    % Basophils      %    % Immature Granulocytes      %    Nucleated RBCs      0 /100    Absolute Neutrophil      1.6 - 8.3 10e9/L    Absolute Lymphocytes      0.8 - 5.3 10e9/L    Absolute Monocytes      0.0 -  1.3 10e9/L    Absolute Eosinophils      0.0 - 0.7 10e9/L    Absolute Basophils      0.0 - 0.2 10e9/L    Abs Immature Granulocytes      0 - 0.4 10e9/L    Absolute Nucleated RBC          IGG      610 - 1,616 mg/dL    IGA      84 - 499 mg/dL    IGM      35 - 242 mg/dL    Creatinine      0.52 - 1.04 mg/dL    GFR Estimate      >60 mL/min/1.73:m2    GFR Estimate If Black      >60 mL/min/1.73:m2    COVID-19 Virus PCR to [facility] - Source       Nasopharyngeal   COVID-19 Virus PCR to [facility] - Result       Not Detected   Neutrophil Cytoplasmic Antibody      <1:10 titer    Neutrophil Cytoplasmic Antibody Pattern          CD19 B Cells      6 - 27 %    Absolute CD19      107 - 698 cells/uL    Myeloperoxidase Antibody IgG      0.0 - 0.9 AI    Proteinase 3 Antibody IgG      0.0 - 0.9 AI    Vitamin D Deficiency screening      20 - 75 ug/L    Sed Rate      0 - 30 mm/h    CRP Inflammation      0.0 - 8.0 mg/L    Albumin      3.4 - 5.0 g/dL    ALT      0 - 50 U/L    AST      0 - 45 U/L      Component      Latest Ref Rng & Units 5/7/2021   WBC      4.0 - 11.0 10e9/L 8.9   RBC Count      3.8 - 5.2 10e12/L 4.89   Hemoglobin      11.7 - 15.7 g/dL 12.7   Hematocrit      35.0 - 47.0 % 39.7   MCV      78 - 100 fl 81   MCH      26.5 - 33.0 pg 26.0 (L)   MCHC      31.5 - 36.5 g/dL 32.0   RDW      10.0 - 15.0 % 13.7   Platelet Count      150 - 450 10e9/L 336   Diff Method       Automated Method   % Neutrophils      % 65.3   % Lymphocytes      % 20.7   % Monocytes      % 7.8   % Eosinophils      % 5.3   % Basophils      % 0.7   % Immature Granulocytes      % 0.2   Nucleated RBCs      0 /100 0   Absolute Neutrophil      1.6 - 8.3 10e9/L 5.8   Absolute Lymphocytes      0.8 - 5.3 10e9/L 1.8   Absolute Monocytes      0.0 - 1.3 10e9/L 0.7   Absolute Eosinophils      0.0 - 0.7 10e9/L 0.5   Absolute Basophils      0.0 - 0.2 10e9/L 0.1   Abs Immature Granulocytes      0 - 0.4 10e9/L 0.0   Absolute Nucleated RBC       0.0   IGG      610 - 1,616 mg/dL 649    IGA      84 - 499 mg/dL 199   IGM      35 - 242 mg/dL 36   Creatinine      0.52 - 1.04 mg/dL 0.96   GFR Estimate      >60 mL/min/1.73:m2 66   GFR Estimate If Black      >60 mL/min/1.73:m2 77   COVID-19 Virus PCR to [facility] - Source          COVID-19 Virus PCR to [facility] - Result          Neutrophil Cytoplasmic Antibody      <1:10 titer <1:10   Neutrophil Cytoplasmic Antibody Pattern       The ANCA IFA is <1:10.  No further testing will be performed.   CD19 B Cells      6 - 27 % <1 (L)   Absolute CD19      107 - 698 cells/uL <1 (L)   Myeloperoxidase Antibody IgG      0.0 - 0.9 AI 1.1 (H)   Proteinase 3 Antibody IgG      0.0 - 0.9 AI <0.2   Vitamin D Deficiency screening      20 - 75 ug/L 41   Sed Rate      0 - 30 mm/h 9   CRP Inflammation      0.0 - 8.0 mg/L <2.9   Albumin      3.4 - 5.0 g/dL 4.1   ALT      0 - 50 U/L 28   AST      0 - 45 U/L 18     ROS: A comprehensive ROS was done. Positives are per HPI.          HISTORY REVIEW:  Past Medical History:   Diagnosis Date     Abnormal glandular Papanicolaou smear of cervix 1992     Allergic rhinitis     Allergy, airborne subst     Arthritis      ASCVD (arteriosclerotic cardiovascular disease)      Chronic pain      Chronic pancreatitis (H)     idiopathic, Tx: PPI, antioxidants, pancreatic enzymes     Common migraine      Coronary artery disease      Costochondritis      Difficulty in walking(719.7)      Dyspnea on exertion      Ectasia, mammary duct     [facility], persistent nipple discharge     Elevated fasting glucose      Gastro-oesophageal reflux disease      Granulomatosis with polyangiitis (H)      Hernia      History of angina      Hyperlipidemia LDL goal < 100      Hypertension goal BP (blood pressure) < 140/90     Essential hypertension     Iron deficiency anemia      Ischemic cardiomyopathy      Menorrhagia      Migraine headaches      Mild persistent asthma      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 Matias, neg w/u, Rx'd w/prednisone     Ventral hernia, unspecified, without mention of obstruction or gangrene 2012     Past Surgical History:   Procedure Laterality Date     C ECHO HEART XTHORACIC,COMPLETE, W/O DOPPLER  04    [facility] WNL, EF 60%     C/SECTION, LOW TRANSVERSE           CARDIAC SURGERY      cardiac stent- recent in 16; 4 other stents     DILATION AND CURETTAGE N/A 2016    Procedure: DILATION AND CURETTAGE;  Surgeon: Nahed Butler MD;  Location: [facility] OR     HC UGI ENDOSCOPY W EUS  08    Dr. Pastrana, possible chronic pancreatitis, fatty liver     HERNIA REPAIR  2012    done at [facility]     INSERT INTRAUTERINE DEVICE N/A 2016    Procedure: INSERT INTRAUTERINE DEVICE;  Surgeon: Nahed Butler MD;  Location: [facility] OR     INT UTERINE FIBRIOD EMBOLIZATION  10/29/2014     LAPAROSCOPIC CHOLECYSTECTOMY  08    Dr. Arnol GRUBBS TX, CERVICAL      s/p LEEP     ORBITOTOMY Right 3/15/2016    Procedure: ORBITOTOMY;  Surgeon: Myron Cyr MD;  Location: [facility]     ORBITOTOMY Right 2017    Procedure: ORBITOTOMY;  RIGHT ORBITOTOMY AND BIOPSY;  Surgeon: Charis Holbrook MD;  Location: [facility]     REPAIR PTOSIS Right 2017    Procedure: REPAIR PTOSIS;  RIGHT UPPER LID PTOSIS REPAIR;  Surgeon: Myron Cyr MD;  Location: [facility]      UPPER GI ENDOSCOPY  10/21/08    mild gastritis, Dr. Hidalgo     Family History   Problem Relation Age of Onset     Heart Disease Father 50        heart attack     Cerebrovascular Disease Father      Diabetes Father      Hypertension Father      Depression Father      C.A.D. Father      Hypertension Mother      Arthritis Mother      Heart Disease Mother         long qt syndrome     Thyroid Disease Mother      C.A.D. Mother      Heart Disease Brother 15        MI at 15, 16.      Diabetes Maternal Uncle      Hypertension Maternal Aunt      Hypertension Maternal Uncle      Arthritis Brother         he passed away, had severe arthritis at age 11     Arthritis Maternal Uncle      Eye Disorder Maternal Uncle         cataracts     Neurologic Disorder Sister         migraines     Neurologic Disorder Sister         migraines     Respiratory Son         asthma     Cerebrovascular Disease Maternal Uncle      C.A.D. Brother      Family History Negative Other         neg for RA, SLE     Unknown/Adopted No family hx of         unknown neurological issues in her family, mother was adopted     Skin Cancer No family hx of         No known family hx of skin cancer     Social History     Socioeconomic History     Marital status: Single     Spouse name: Not on file     Number of children: 1     Years of education: Not on file     Highest education level: Not on file   Occupational History     Employer: NONE    Tobacco Use     Smoking status: Current Every Day Smoker     Packs/day: 0.20     Years: 1.00     Pack years: 0.20     Types: Cigarettes     Last attempt to quit: 2016     Years since quittin.8     Smokeless tobacco: Never Used   Substance and Sexual Activity     Alcohol use: No     Alcohol/week: 0.0 standard drinks     Drug use: No     Sexual activity: Not Currently   Other Topics Concern     Parent/sibling w/ CABG, MI or angioplasty before 65F 55M? Yes   Social History Narrative    Balanced Diet - sometimes     Osteoporosis Prevention Measures - Dairy servings per day: 2 servings weekly    Regular Exercise -  Yes Describe walking 4 times a week    Dental Exam - NO    Seatbelts used - Yes    Self Breast Exam - Yes    Abuse: Current or Past (Physical, Sexual or Emotional)- No    Do you have any concerns about STD's -  No    Do you feel safe in your environment - No    Guns stored in the home - No    Sunscreen used - Yes    Lipids -  YES - Date: 053102    Glucose -  YES - Date: 012804    Eye Exam - YES - Date: one year ago    Colon Cancer Screening - No    Hemoccults - NO    Pap Test -  YES - Date: [id number], remote history of LEEP    Mammography - YES - Date: last spring, would like to discuss, needs a referral to [facility]    DEXA - NO    Immunizations reviewed and up to date - Yes, last td given in 1997 or 1998     Social Determinants of Health     Financial Resource Strain: Not on file   Food Insecurity: Not on file   Transportation Needs: Not on file   Physical Activity: Not on file   Stress: Not on file   Social Connections: Not on file   Intimate Partner Violence: Not on file   Housing Stability: Not on file     Patient Active Problem List   Diagnosis     Seasonal affective disorder (H)     Allergic rhinitis     PCOS (polycystic ovarian syndrome)     Moderate persistent asthma     Chronic pancreatitis (H)     Hypertension goal BP (blood pressure) < 140/90     Common migraine     NSTEMI (non-ST elevated myocardial infarction) (H)     Hyperlipidemia LDL goal <70     ASCVD (arteriosclerotic cardiovascular disease)     GERD (gastroesophageal reflux disease)     Ischemic cardiomyopathy     Hypertensive heart disease     Uterine leiomyoma     Iron deficiency anemia     Costochondritis     Vitamin D deficiency     Breast cancer screening     Spinal stenosis in cervical region     Fibromyalgia     Seronegative rheumatoid arthritis (H)     Type 2 diabetes, HbA1C goal < 8% (H)     Type 2 diabetes mellitus with  other specified complication (H)     Hyperlipidemia associated with type 2 diabetes mellitus (H)     Hypertension associated with diabetes (H)     Overweight with body mass index (BMI) 25.0-29.9     Tobacco use disorder     Telogen effluvium     CAD S/P percutaneous coronary angioplasty     Status post coronary angiogram     ANCA-associated vasculitis (H)     Wegener's vasculitis     Type 1 diabetes mellitus with complications (H)     Chest discomfort     Urinary frequency     Abdominal pain, epigastric     Hypokalemia     Leukocytosis, unspecified type     Acute pancreatitis, unspecified complication status, unspecified pancreatitis type       Pregnancy Hx: She is . All misscarriages were in first trimester. H/o OC use in the past. 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    PMHx, FHx, SHx were reviewed, unchanged.      Outpatient Encounter Medications as of 2021   Medication Sig Dispense Refill     acetaminophen (TYLENOL) 325 MG tablet Take 1-2 tablets (325-650 mg) by mouth every 6 hours as needed for pain (headache) 250 tablet 4     albuterol (2.5 MG/3ML) 0.083% neb solution INL 1 VIAL VIA NEBULIZATION Q 4 TO 6 HOURS PRN  1     albuterol (PROAIR HFA, PROVENTIL HFA, VENTOLIN HFA) 108 (90 BASE) MCG/ACT inhaler Inhale 2 puffs into the lungs every 6 hours as needed for shortness of breath / dyspnea or wheezing 3 Inhaler 1     aspirin (ASPIRIN LOW DOSE) 81 MG EC tablet Take 1 tablet (81 mg) by mouth daily . 30-day refills only. 30 tablet 11     blood glucose monitoring (NO BRAND SPECIFIED) meter device kit Use to test blood sugar 4 X times daily or as directed. (Patient taking differently: 1 kit Use to test blood sugar 4 X times daily or as directed.) 1 kit 0     blood glucose monitoring (NO BRAND SPECIFIED) test strip 1 strip by In Vitro route 4 times daily Test as directed. Please dispense three months and three months of refills. Thank you. (Patient taking differently: 1 strip by In  Vitro route 4 times daily Test as directed. Please dispense three months and three months of refills. Thank you.) 360 each 3     Blood Pressure Monitor KIT 1 each daily Monitor home blood pressure as instructed by physician.  Dispense [place] blood pressure kit. 1 kit 0     calcium carbonate (TUMS) 500 MG chewable tablet Take 3-4 tablets (1,500-2,000 mg) by mouth daily as needed 90 tablet 3     clopidogrel (PLAVIX) 75 MG tablet Take 1 tablet (75 mg) by mouth daily . 30-day refills only. 30 tablet 11     cyclobenzaprine (FLEXERIL) 10 MG tablet Take 1 tablet (10 mg) by mouth 2 times daily as needed for muscle spasms 60 tablet 3     diphenhydrAMINE (BENADRYL) 25 MG capsule Take 1 capsule (25 mg) by mouth nightly as needed for itching or allergies 30 capsule 2     EPIPEN 2-RIKY 0.3 MG/0.3ML injection INJECT 0.3 MG INTO THE MUSCLE PRF ANAPHYALAXIS  0     famotidine (PEPCID) 20 MG tablet Take 1 tablet (20 mg) by mouth 2 times daily as needed (abdominal pain) 20 tablet 0     fexofenadine (ALLEGRA) 180 MG tablet Take 1 tablet by mouth daily as needed. 90 tablet 3     folic acid (FOLVITE) 1 MG tablet Take 1 tablet by mouth daily 90 tablet 3     insulin glargine (LANTUS PEN) 100 UNIT/ML pen Inject 58 Units Subcutaneous every morning       insulin pen needle (BD MARCK U/F) 32G X 4 MM USE DAILY OR AS DIRECTED 300 each 3     lisinopril-hydrochlorothiazide (ZESTORETIC) 20-25 MG tablet Take 1 tablet by mouth daily . 30-day refills only. 30 tablet 11     magnesium 250 MG tablet TAKE 1 TABLET(250 MG) BY MOUTH TWICE DAILY 60 tablet 1     metFORMIN (GLUCOPHAGE-XR) 500 MG 24 hr tablet Take 2,000 mg by mouth daily (with dinner)       metoprolol succinate ER (TOPROL-XL) 100 MG 24 hr tablet Take 1 tablet (100 mg) by mouth daily . 30-day refills only. 30 tablet 11     Multiple Vitamin (DAILY-GERALD) TABS Take 1 tablet by mouth daily 90 tablet 0     nitroGLYcerin (NITROSTAT) 0.4 MG sublingual tablet Place 1 tablet (0.4 mg) under the tongue every  5 minutes as needed for chest pain . After 3 doses, if pain persists call 911. 25 tablet 3     ondansetron (ZOFRAN-ODT) 4 MG ODT tab Take 1 tablet (4 mg) by mouth every 8 hours as needed for nausea 12 tablet 0     polyethylene glycol (MIRALAX) 17 GM/Dose powder Take 17 g by mouth daily 225 g 0     pravastatin (PRAVACHOL) 40 MG tablet Take 1 tablet (40 mg) by mouth daily . 30-day refills only. 30 tablet 11     sennosides (SENOKOT) 8.6 MG tablet 1-2 tabs a day as needed for constipation 60 tablet 1     spironolactone (ALDACTONE) 25 MG tablet Take 1 tablet (25 mg) by mouth daily . Take one additional 0.5 tab daily as needed for weight gain. 45 day refills. 45 tablet 11     traMADol (ULTRAM) 50 MG tablet TAKE 1 TABLET(50 MG) BY MOUTH EVERY 8 HOURS AS NEEDED FOR MODERATE PAIN 60 tablet 3     vitamin D2 (ERGOCALCIFEROL) 40538 units (1250 mcg) capsule Take 1 capsule (50,000 Units) by mouth once a week 12 capsule 0     COMPRESSION STOCKINGS 2 each daily Apply one 10-15 mmHg compression stocking to each lower extgmierty during the day and remove before bedtime. (Patient not taking: Reported on 10/18/2021) 4 each 2     dicyclomine (BENTYL) 20 MG tablet TAKE 1 TABLET(20 MG) BY MOUTH FOUR TIMES DAILY AS NEEDED. Pls schedule clinic appt for refills. (Patient not taking: Reported on 10/18/2021) 60 tablet 0     Ergocalciferol (VITAMIN D2) 50 MCG (2000 UT) TABS Take 2,000 Units by mouth daily (Patient not taking: Reported on 10/18/2021) 90 tablet 1     fluocinolone (SYNALAR) 0.01 % solution Apply topically daily as needed (Patient not taking: Reported on 10/18/2021)       fluocinonide (LIDEX) 0.05 % external ointment Apply topically as needed (Patient not taking: Reported on 10/18/2021)       fluticasone-vilanterol (BREO ELLIPTA) 200-25 MCG/INH inhaler Inhale 1 puff into the lungs daily (Patient not taking: Reported on 10/18/2021)       hydroxychloroquine (PLAQUENIL) 200 MG tablet Take 2 tablets (400 mg) by mouth daily (Annual eye  exam/plaquenil screening) (Patient not taking: Reported on 10/18/2021) 180 tablet 0     ketoconazole (NIZORAL) 2 % shampoo Apply topically daily as needed (Patient not taking: Reported on 10/18/2021)       montelukast (SINGULAIR) 10 MG tablet Take 1 tablet (10 mg) by mouth At Bedtime (Patient not taking: Reported on 10/18/2021) 30 tablet 1     vitamin D3 (CHOLECALCIFEROL) 50 mcg (2000 units) tablet Take 1 tablet (50 mcg) by mouth daily (Patient not taking: Reported on 10/18/2021) 90 tablet 1     No facility-administered encounter medications on file as of 12/16/2021.       Allergies   Allergen Reactions     Amoxicillin-Pot Clavulanate      Augmentin      Unknown possible hives and edema     Azithromycin      Diatrizoate Other (See Comments)     Pt wants this listed because she is allergic to shellfish      Imitrex [Sumatriptan]      Severe face/neck/chest tightness and flushing side effects      Penicillins Hives     Unknown      Pork Allergy      Stomach pain, cramping, diarrhea, itching, nausea and headaches     Shellfish Allergy Hives and Swelling     Sulfa Drugs Hives and Swelling     Zithromax [Azithromycin Dihydrate] Swelling     Unknown          Ph.E:    BP (!) 134/92   Pulse 69   Wt 79.3 kg (174 lb 14.4 oz)   SpO2 96%   BMI 30.98 kg/m    GA: NAD, pleasant  HEENT: nl conj, sclera, EOMI. No campos-orbital edema  Chest: CTAB  CV: no M/R/G, RRR  Abdomen: soft, NT  MSK: painful ROM of hands on making fist  Skin: no rash  Neuro: non focal  Psych: nl affect        Assessment/ plan:    1-Seropositive non-erosive RA (arthritis, AM stiffness, high CRP, RF 26 but re-check neg 3/2015 on HCQ) Dx 5/2013, FMS, new pleuritic CP 3/20-15-5/2015 resolved on steroids. She is on  mg bid since 5/2014. She tolerates it well and it's helping some but not enough to control all her pain. Continues having flare ups of joint pain, could be GPA related. Some pain is due to FMS.    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 Prednisone was not a good option for her given weight gain, diabetes. She tried and did not tolerate MTX, AZA.    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 Repeat MPO was positive in 6/2017.    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 Shah and Dr. Valdez at [place]. 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     She is on rituximab since 12/12/2018 with great response, minor allergic reaction which could be managed by pre-meds and extra steroid dose.      Stable GPA but rituximab does not last 6 months. Has lots of joint pain, repeat orbit CT showed improvement. According to ACR guideline could give every 4-6 months. Stable labs. We could move up next rituximab from 4/2022 to 2/2022 if joint pain gets worse.    Had last rituximab 1 gram one dose only on 10/18/2021.      My impression is that her arthralgias are a combination of RA/ IA sec GPA, fibromyalgia and chronic pain.     2-Fad pads. She was seen by endocrinology and cushing was ruled out in 4/2014. Was advised to do f/u for enlarged thyroid/thyroid nodules.    3-Hair loss. F/U with dermatology    4-Chronic pain. F/U with pain clinic    5-Vit D deficiency. Was replaced with vit D 50, 000 units po qwk x 12 wk then 2000 units every day    6-Discussed COVID vaccination, timing with rituximab to get max benefit, she will consider. Discussed monoclonal Ab.    7-?HCQ toxicity on OCT 7/2021, now off HCQ, could explain increased arthralgia      Today's plan:    Rituximab 1 gram in 4/2022, but will move it up to 2/2022 if joint pain gets worse    Labs in 2/2022    Consider zanaflex    [email or web address].[email or web address]/[email or web address]/[email or web address]/meds.[email or web address]    Return in 3-4 months (in person)        Majo Mckinnon MD

## 2021-12-27 ENCOUNTER — TELEPHONE (OUTPATIENT)
Dept: RHEUMATOLOGY | Facility: CLINIC | Age: 56
End: 2021-12-27
Payer: COMMERCIAL

## 2021-12-27 DIAGNOSIS — I77.82 ANCA-ASSOCIATED VASCULITIS (H): ICD-10-CM

## 2021-12-27 DIAGNOSIS — M31.30 GRANULOMATOSIS WITH POLYANGIITIS WITHOUT RENAL INVOLVEMENT (H): Primary | ICD-10-CM

## 2021-12-27 NOTE — TELEPHONE ENCOUNTER
----- Message from Majo Mckinnon MD sent at 12/16/2021 10:18 AM CST -----  Regarding: rituxiamb  Rituximab 1 gram in 4/2022, but will move it up to 2/2022 if joint pain gets worse

## 2021-12-28 RX ORDER — HEPARIN SODIUM (PORCINE) LOCK FLUSH IV SOLN 100 UNIT/ML 100 UNIT/ML
5 SOLUTION INTRAVENOUS
Status: CANCELLED | OUTPATIENT
Start: 2022-02-01

## 2021-12-28 RX ORDER — HEPARIN SODIUM,PORCINE 10 UNIT/ML
5 VIAL (ML) INTRAVENOUS
Status: CANCELLED | OUTPATIENT
Start: 2022-02-01

## 2021-12-28 RX ORDER — EPINEPHRINE 1 MG/ML
0.3 INJECTION, SOLUTION, CONCENTRATE INTRAVENOUS EVERY 5 MIN PRN
Status: CANCELLED | OUTPATIENT
Start: 2022-02-01

## 2021-12-28 RX ORDER — MEPERIDINE HYDROCHLORIDE 25 MG/ML
25 INJECTION INTRAMUSCULAR; INTRAVENOUS; SUBCUTANEOUS EVERY 30 MIN PRN
Status: CANCELLED | OUTPATIENT
Start: 2022-02-01

## 2021-12-28 RX ORDER — ALBUTEROL SULFATE 0.83 MG/ML
2.5 SOLUTION RESPIRATORY (INHALATION)
Status: CANCELLED | OUTPATIENT
Start: 2022-02-01

## 2021-12-28 RX ORDER — METHYLPREDNISOLONE SODIUM SUCCINATE 125 MG/2ML
125 INJECTION, POWDER, LYOPHILIZED, FOR SOLUTION INTRAMUSCULAR; INTRAVENOUS
Status: CANCELLED
Start: 2022-02-01

## 2021-12-28 RX ORDER — NALOXONE HYDROCHLORIDE 0.4 MG/ML
0.2 INJECTION, SOLUTION INTRAMUSCULAR; INTRAVENOUS; SUBCUTANEOUS
Status: CANCELLED | OUTPATIENT
Start: 2022-02-01

## 2021-12-28 RX ORDER — DIPHENHYDRAMINE HYDROCHLORIDE 50 MG/ML
50 INJECTION INTRAMUSCULAR; INTRAVENOUS
Status: CANCELLED
Start: 2022-02-01

## 2021-12-28 RX ORDER — METHYLPREDNISOLONE SODIUM SUCCINATE 125 MG/2ML
125 INJECTION, POWDER, LYOPHILIZED, FOR SOLUTION INTRAMUSCULAR; INTRAVENOUS ONCE
Status: CANCELLED | OUTPATIENT
Start: 2022-02-01

## 2021-12-28 RX ORDER — ALBUTEROL SULFATE 90 UG/1
1-2 AEROSOL, METERED RESPIRATORY (INHALATION)
Status: CANCELLED
Start: 2022-02-01

## 2021-12-28 RX ORDER — ACETAMINOPHEN 325 MG/1
650 TABLET ORAL ONCE
Status: CANCELLED
Start: 2022-02-01

## 2022-01-29 DIAGNOSIS — M19.90 INFLAMMATORY ARTHRITIS: ICD-10-CM

## 2022-02-01 RX ORDER — MULTIVITAMIN WITH IRON
TABLET ORAL
Qty: 60 TABLET | Refills: 1 | Status: SHIPPED | OUTPATIENT
Start: 2022-02-01 | End: 2022-07-28

## 2022-02-01 NOTE — TELEPHONE ENCOUNTER
Magnesium 250 mg twice per day  Last Written Prescription Date:  11/25/2021  Last Fill Quantity: 60,   # refills: 1  Last Office Visit : 12/16/2021  Future Office visit:  4/22/2022    Routing refill request to provider for review/approval because:  Not on protocol    Last Magnesium drawn 5/28/2021

## 2022-02-04 ENCOUNTER — OFFICE VISIT (OUTPATIENT)
Dept: FAMILY MEDICINE | Facility: CLINIC | Age: 57
End: 2022-02-04
Payer: COMMERCIAL

## 2022-02-04 ENCOUNTER — LAB (OUTPATIENT)
Dept: LAB | Facility: CLINIC | Age: 57
End: 2022-02-04
Payer: COMMERCIAL

## 2022-02-04 VITALS
BODY MASS INDEX: 29.59 KG/M2 | DIASTOLIC BLOOD PRESSURE: 78 MMHG | HEART RATE: 121 BPM | WEIGHT: 167 LBS | HEIGHT: 63 IN | OXYGEN SATURATION: 99 % | SYSTOLIC BLOOD PRESSURE: 114 MMHG | RESPIRATION RATE: 16 BRPM

## 2022-02-04 DIAGNOSIS — R10.13 EPIGASTRIC PAIN: ICD-10-CM

## 2022-02-04 DIAGNOSIS — Z51.81 MEDICATION MONITORING ENCOUNTER: ICD-10-CM

## 2022-02-04 DIAGNOSIS — N76.0 VAGINITIS AND VULVOVAGINITIS: ICD-10-CM

## 2022-02-04 DIAGNOSIS — R10.13 EPIGASTRIC PAIN: Primary | ICD-10-CM

## 2022-02-04 DIAGNOSIS — R61 NIGHT SWEATS: ICD-10-CM

## 2022-02-04 DIAGNOSIS — E11.9 TYPE 2 DIABETES, HBA1C GOAL < 8% (H): ICD-10-CM

## 2022-02-04 DIAGNOSIS — Z00.00 HEALTH CARE MAINTENANCE: ICD-10-CM

## 2022-02-04 DIAGNOSIS — I77.82 ANCA-ASSOCIATED VASCULITIS (H): ICD-10-CM

## 2022-02-04 DIAGNOSIS — E55.9 VITAMIN D DEFICIENCY: ICD-10-CM

## 2022-02-04 LAB
ALBUMIN SERPL-MCNC: 4.6 G/DL (ref 3.4–5)
ALBUMIN UR-MCNC: NEGATIVE MG/DL
ALP SERPL-CCNC: 119 U/L (ref 40–150)
ALT SERPL W P-5'-P-CCNC: 42 U/L (ref 0–50)
ANION GAP SERPL CALCULATED.3IONS-SCNC: 8 MMOL/L (ref 3–14)
APPEARANCE UR: ABNORMAL
AST SERPL W P-5'-P-CCNC: 19 U/L (ref 0–45)
BASOPHILS # BLD AUTO: 0.1 10E3/UL (ref 0–0.2)
BASOPHILS NFR BLD AUTO: 0 %
BILIRUB SERPL-MCNC: 0.4 MG/DL (ref 0.2–1.3)
BILIRUB UR QL STRIP: NEGATIVE
BUN SERPL-MCNC: 23 MG/DL (ref 7–30)
CALCIUM SERPL-MCNC: 9.7 MG/DL (ref 8.5–10.1)
CHLORIDE BLD-SCNC: 102 MMOL/L (ref 94–109)
CO2 SERPL-SCNC: 26 MMOL/L (ref 20–32)
COLOR UR AUTO: YELLOW
CREAT SERPL-MCNC: 0.93 MG/DL (ref 0.52–1.04)
CREAT UR-MCNC: 113 MG/DL
CRP SERPL-MCNC: 6 MG/L (ref 0–8)
EOSINOPHIL # BLD AUTO: 0.1 10E3/UL (ref 0–0.7)
EOSINOPHIL NFR BLD AUTO: 1 %
ERYTHROCYTE [DISTWIDTH] IN BLOOD BY AUTOMATED COUNT: 13.2 % (ref 10–15)
ERYTHROCYTE [SEDIMENTATION RATE] IN BLOOD BY WESTERGREN METHOD: 10 MM/HR (ref 0–30)
GFR SERPL CREATININE-BSD FRML MDRD: 72 ML/MIN/1.73M2
GLUCOSE BLD-MCNC: 322 MG/DL (ref 70–99)
GLUCOSE UR STRIP-MCNC: >499 MG/DL
HBA1C MFR BLD: 10.8 % (ref 0–5.6)
HCT VFR BLD AUTO: 42.1 % (ref 35–47)
HGB BLD-MCNC: 13.9 G/DL (ref 11.7–15.7)
HGB UR QL STRIP: NEGATIVE
IMM GRANULOCYTES # BLD: 0.1 10E3/UL
IMM GRANULOCYTES NFR BLD: 0 %
KETONES UR STRIP-MCNC: 20 MG/DL
LEUKOCYTE ESTERASE UR QL STRIP: ABNORMAL
LIPASE SERPL-CCNC: 154 U/L (ref 73–393)
LYMPHOCYTES # BLD AUTO: 2.3 10E3/UL (ref 0.8–5.3)
LYMPHOCYTES NFR BLD AUTO: 14 %
MCH RBC QN AUTO: 25.1 PG (ref 26.5–33)
MCHC RBC AUTO-ENTMCNC: 33 G/DL (ref 31.5–36.5)
MCV RBC AUTO: 76 FL (ref 78–100)
MONOCYTES # BLD AUTO: 0.7 10E3/UL (ref 0–1.3)
MONOCYTES NFR BLD AUTO: 5 %
MUCOUS THREADS #/AREA URNS LPF: PRESENT /LPF
NEUTROPHILS # BLD AUTO: 13.2 10E3/UL (ref 1.6–8.3)
NEUTROPHILS NFR BLD AUTO: 80 %
NITRATE UR QL: NEGATIVE
NRBC # BLD AUTO: 0 10E3/UL
NRBC BLD AUTO-RTO: 0 /100
PH UR STRIP: 5 [PH] (ref 5–7)
PLATELET # BLD AUTO: 405 10E3/UL (ref 150–450)
POTASSIUM BLD-SCNC: 3.8 MMOL/L (ref 3.4–5.3)
PROT SERPL-MCNC: 8 G/DL (ref 6.8–8.8)
PROT UR-MCNC: 0.14 G/L
PROT/CREAT 24H UR: 0.12 G/G CR (ref 0–0.2)
RBC # BLD AUTO: 5.53 10E6/UL (ref 3.8–5.2)
RBC URINE: 19 /HPF
SODIUM SERPL-SCNC: 136 MMOL/L (ref 133–144)
SP GR UR STRIP: 1.03 (ref 1–1.03)
SQUAMOUS EPITHELIAL: 1 /HPF
T4 FREE SERPL-MCNC: 1.27 NG/DL (ref 0.76–1.46)
TSH SERPL DL<=0.005 MIU/L-ACNC: 0.36 MU/L (ref 0.4–4)
UROBILINOGEN UR STRIP-MCNC: NORMAL MG/DL
WBC # BLD AUTO: 16.4 10E3/UL (ref 4–11)
WBC URINE: 95 /HPF

## 2022-02-04 PROCEDURE — 83690 ASSAY OF LIPASE: CPT | Performed by: PATHOLOGY

## 2022-02-04 PROCEDURE — 87086 URINE CULTURE/COLONY COUNT: CPT | Mod: 90 | Performed by: PATHOLOGY

## 2022-02-04 PROCEDURE — 84439 ASSAY OF FREE THYROXINE: CPT | Performed by: PATHOLOGY

## 2022-02-04 PROCEDURE — 81001 URINALYSIS AUTO W/SCOPE: CPT | Performed by: PATHOLOGY

## 2022-02-04 PROCEDURE — 36415 COLL VENOUS BLD VENIPUNCTURE: CPT | Performed by: PATHOLOGY

## 2022-02-04 PROCEDURE — 86355 B CELLS TOTAL COUNT: CPT | Mod: 90 | Performed by: PATHOLOGY

## 2022-02-04 PROCEDURE — 99215 OFFICE O/P EST HI 40 MIN: CPT | Mod: 25 | Performed by: FAMILY MEDICINE

## 2022-02-04 PROCEDURE — 83036 HEMOGLOBIN GLYCOSYLATED A1C: CPT | Performed by: PATHOLOGY

## 2022-02-04 PROCEDURE — 86256 FLUORESCENT ANTIBODY TITER: CPT | Mod: 90 | Performed by: PATHOLOGY

## 2022-02-04 PROCEDURE — 86140 C-REACTIVE PROTEIN: CPT | Performed by: PATHOLOGY

## 2022-02-04 PROCEDURE — 91305 COVID-19,PF,PFIZER (12+ YRS): CPT | Performed by: FAMILY MEDICINE

## 2022-02-04 PROCEDURE — 86036 ANCA SCREEN EACH ANTIBODY: CPT | Mod: 90 | Performed by: PATHOLOGY

## 2022-02-04 PROCEDURE — 85652 RBC SED RATE AUTOMATED: CPT | Performed by: PATHOLOGY

## 2022-02-04 PROCEDURE — 84156 ASSAY OF PROTEIN URINE: CPT | Performed by: PATHOLOGY

## 2022-02-04 PROCEDURE — 82784 ASSAY IGA/IGD/IGG/IGM EACH: CPT | Mod: 90 | Performed by: PATHOLOGY

## 2022-02-04 PROCEDURE — 0051A COVID-19,PF,PFIZER (12+ YRS): CPT | Performed by: FAMILY MEDICINE

## 2022-02-04 RX ORDER — ESOMEPRAZOLE MAGNESIUM 40 MG/1
40 CAPSULE, DELAYED RELEASE ORAL
Qty: 30 CAPSULE | Refills: 3 | Status: ON HOLD | OUTPATIENT
Start: 2022-02-04 | End: 2022-03-31 | Stop reason: DRUGHIGH

## 2022-02-04 RX ORDER — SUCRALFATE ORAL 1 G/10ML
1 SUSPENSION ORAL 4 TIMES DAILY
Qty: 1200 ML | Refills: 1 | Status: SHIPPED | OUTPATIENT
Start: 2022-02-04 | End: 2022-02-08

## 2022-02-04 RX ORDER — DILTIAZEM HYDROCHLORIDE 60 MG/1
TABLET, FILM COATED ORAL
COMMUNITY
Start: 2022-02-03 | End: 2022-08-19

## 2022-02-04 ASSESSMENT — ASTHMA QUESTIONNAIRES
QUESTION_3 LAST FOUR WEEKS HOW OFTEN DID YOUR ASTHMA SYMPTOMS (WHEEZING, COUGHING, SHORTNESS OF BREATH, CHEST TIGHTNESS OR PAIN) WAKE YOU UP AT NIGHT OR EARLIER THAN USUAL IN THE MORNING: ONCE OR TWICE
ACT_TOTALSCORE: 16
QUESTION_5 LAST FOUR WEEKS HOW WOULD YOU RATE YOUR ASTHMA CONTROL: WELL CONTROLLED
QUESTION_1 LAST FOUR WEEKS HOW MUCH OF THE TIME DID YOUR ASTHMA KEEP YOU FROM GETTING AS MUCH DONE AT WORK, SCHOOL OR AT HOME: A LITTLE OF THE TIME
ACT_TOTALSCORE: 16
QUESTION_2 LAST FOUR WEEKS HOW OFTEN HAVE YOU HAD SHORTNESS OF BREATH: MORE THAN ONCE A DAY
QUESTION_4 LAST FOUR WEEKS HOW OFTEN HAVE YOU USED YOUR RESCUE INHALER OR NEBULIZER MEDICATION (SUCH AS ALBUTEROL): TWO OR THREE TIMES PER WEEK

## 2022-02-04 ASSESSMENT — MIFFLIN-ST. JEOR: SCORE: 1321.64

## 2022-02-04 ASSESSMENT — PAIN SCALES - GENERAL: PAINLEVEL: MODERATE PAIN (5)

## 2022-02-04 NOTE — PROGRESS NOTES
"  Assessment & Plan     Epigastric pain  Better w/ GI cocktail quickly, signicant pain from about 5 to about 1 over 10. Last May pancreatitis, so need to eval that too. Labs today. Resume PPI/carafate. See me in four days, consider then see GI, imaging timur pancreas, or endoscopic study.  - lidocaine (viscous) (XYLOCAINE) 2 % 15 mL, alum & mag hydroxide-simethicone (MAALOX) 15 mL GI Cocktail  - CBC with platelets differential; Future  - Helicobacter pylori Antigen Stool; Future  - Comprehensive metabolic panel; Future  - Lipase; Future  - UA with Micro reflex to Culture; Future  - esomeprazole (NEXIUM) 40 MG DR capsule; Take 1 capsule (40 mg) by mouth every morning (before breakfast) Take 30-60 minutes before eating.  - sucralfate (CARAFATE) 1 GM/10ML suspension; Take 10 mLs (1 g) by mouth 4 times daily    Health care maintenance  Due  - COVID-19,PF,PFIZER (12+ Yrs GRAY LABEL)    Type 2 diabetes, HbA1C goal < 8% (H)  Due  - Hemoglobin A1c; Future  - TSH with free T4 reflex; Future    Vaginitis and vulvovaginitis  Discuss again next time could be bv, took diflucan hi dose no better; discussed several issues make her more prone to yeast vaginally/thrush (might need oral nystatin)    Night sweats  Review again next week      If do endoscopic consider colonoscopy then too          66 minutes spent on the date of the encounter doing chart review, history and exam, documentation and further activities per the note    Tobacco Cessation:   reports that she has been smoking cigarettes. She has a 0.20 pack-year smoking history. She has never used smokeless tobacco.    BMI:   Estimated body mass index is 29.58 kg/m  as calculated from the following:    Height as of this encounter: 1.6 m (5' 3\").    Weight as of this encounter: 75.8 kg (167 lb).     Kash Solano MD  [facility]    Adrienne Mehta is a 56 year old who presents for the following health issues     HPI Here for f/u  I " have not seen over three years but having GI sx that remind her of when we last met  See that note  Overall controls sx w/ PPI, carafate, bentyl, reglan  Out of all of these  Last G empty test I can see was negative  Last spring admitted w/ similar sx, pancreatitis, was to have outpt GI f/u but has not (eus or ercp), notes rvwd  Has epig pain, radiates to back all the time worse if eats  No recent egd or colonoscopy (should do for age anyway)  No blood or melena of stool  Stools are either mucousy or normal   Has autoimmune disorder (rheum notes rvwd) but it is not known to impact GI system as of yet at least  Here w/ son-describe lots of stress in various homes they've lived in the last few years  Getting vaginitis, tried extensive antifungals no help  No STD risks, sexual activity remote  Occasional night sweats, drenching, but not nightly  Wt varies, no defniite down trend  Due for DM labs, they run hi at home 2-300  Gets remicade IV from Rheum last October per pt next in three weeks  Open to covid immunizations  Always some level of GI symptoms    Past Medical History:   Diagnosis Date     Abnormal glandular Papanicolaou smear of cervix 1992     Allergic rhinitis     Allergy, airborne subst     Arthritis      ASCVD (arteriosclerotic cardiovascular disease)      Chronic pain      Chronic pancreatitis (H)     idiopathic, Tx: PPI, antioxidants, pancreatic enzymes     Common migraine      Coronary artery disease      Costochondritis      Difficulty in walking(719.7)      Dyspnea on exertion      Ectasia, mammary duct     [facility], persistent nipple discharge     Elevated fasting glucose      Gastro-oesophageal reflux disease      Granulomatosis with polyangiitis (H)      Hernia      History of angina      Hyperlipidemia LDL goal < 100      Hypertension goal BP (blood pressure) < 140/90     Essential hypertension     Iron deficiency anemia      Ischemic cardiomyopathy      Menorrhagia      Migraine  headaches      Mild persistent asthma      Neuritis optic     subacute autoimmune retrobulbar neuritis, Dr. 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 Matias, neg w/u, Rx'd w/prednisone     Ventral hernia, unspecified, without mention of obstruction or gangrene 2012     Past Surgical History:   Procedure Laterality Date     C/SECTION, LOW TRANSVERSE           CARDIAC SURGERY      cardiac stent- recent in 16; 4 other stents     DILATION AND CURETTAGE N/A 2016    Procedure: DILATION AND CURETTAGE;  Surgeon: Nahed Butler MD;  Location: [facility] OR     HC UGI ENDOSCOPY W EUS  08    Dr. Pastrana, possible chronic pancreatitis, fatty liver     HERNIA REPAIR  2012    done at [facility]     INSERT INTRAUTERINE DEVICE N/A 2016    Procedure: INSERT INTRAUTERINE DEVICE;  Surgeon: Nahed Butler MD;  Location: [facility] OR     INT UTERINE FIBRIOD EMBOLIZATION  10/29/2014     LAPAROSCOPIC CHOLECYSTECTOMY  08    Dr. Arnol GRUBBS TX, CERVICAL      s/p LEEP     ORBITOTOMY Right 3/15/2016    Procedure: ORBITOTOMY;  Surgeon: Myron Cyr MD;  Location: [facility]     ORBITOTOMY Right 2017    Procedure: ORBITOTOMY;  RIGHT ORBITOTOMY AND BIOPSY;  Surgeon: Charis Holbrook MD;  Location: [facility]     REPAIR PTOSIS Right 2017    Procedure: REPAIR PTOSIS;  RIGHT UPPER LID PTOSIS REPAIR;  Surgeon: Myron Cyr  MD Selvin;  Location: [facility]     UPPER GI ENDOSCOPY  10/21/08    mild gastritis, Dr. Rocky [facility] ECHO HEART XTHORACIC,COMPLETE, W/O DOPPLER  2/4/04    [facility] WNL, EF 60%     Current Outpatient Medications   Medication     acetaminophen (TYLENOL) 325 MG tablet     albuterol (2.5 MG/3ML) 0.083% neb solution     albuterol (PROAIR HFA, PROVENTIL HFA, VENTOLIN HFA) 108 (90 BASE) MCG/ACT inhaler     aspirin (ASPIRIN LOW DOSE) 81 MG EC tablet     blood glucose monitoring (NO BRAND SPECIFIED) meter device kit     blood glucose monitoring (NO BRAND SPECIFIED) test strip     Blood Pressure Monitor KIT     calcium carbonate (TUMS) 500 MG chewable tablet     clopidogrel (PLAVIX) 75 MG tablet     cyclobenzaprine (FLEXERIL) 10 MG tablet     diphenhydrAMINE (BENADRYL) 25 MG capsule     EPIPEN 2-RIKY 0.3 MG/0.3ML injection     esomeprazole (NEXIUM) 40 MG DR capsule     famotidine (PEPCID) 20 MG tablet     fexofenadine (ALLEGRA) 180 MG tablet     folic acid (FOLVITE) 1 MG tablet     insulin glargine (LANTUS PEN) 100 UNIT/ML pen     insulin pen needle (BD MARCK U/F) 32G X 4 MM     lisinopril-hydrochlorothiazide (ZESTORETIC) 20-25 MG tablet     magnesium 250 MG tablet     metFORMIN (GLUCOPHAGE-XR) 500 MG 24 hr tablet     metoprolol succinate ER (TOPROL-XL) 100 MG 24 hr tablet     Multiple Vitamin (DAILY-GERALD) TABS     nitroGLYcerin (NITROSTAT) 0.4 MG sublingual tablet     ondansetron (ZOFRAN-ODT) 4 MG ODT tab     polyethylene glycol (MIRALAX) 17 GM/Dose powder     pravastatin (PRAVACHOL) 40 MG tablet     sennosides (SENOKOT) 8.6 MG tablet     spironolactone (ALDACTONE) 25 MG tablet     sucralfate (CARAFATE) 1 GM/10ML suspension     SYMBICORT 80-4.5 MCG/ACT Inhaler     traMADol (ULTRAM) 50 MG tablet     vitamin D2 (ERGOCALCIFEROL) 52620 units (1250 mcg) capsule     COMPRESSION STOCKINGS     dicyclomine (BENTYL) 20 MG tablet     Ergocalciferol (VITAMIN D2) 50 MCG (2000 UT) TABS     fluocinolone (SYNALAR) 0.01 % solution  "    fluocinonide (LIDEX) 0.05 % external ointment     fluticasone-vilanterol (BREO ELLIPTA) 200-25 MCG/INH inhaler     ketoconazole (NIZORAL) 2 % shampoo     montelukast (SINGULAIR) 10 MG tablet     vitamin D3 (CHOLECALCIFEROL) 50 mcg (2000 units) tablet     No current facility-administered medications for this visit.     Allergies   Allergen Reactions     Amoxicillin-Pot Clavulanate      Augmentin      Unknown possible hives and edema     Azithromycin      Diatrizoate Other (See Comments)     Pt wants this listed because she is allergic to shellfish      Imitrex [Sumatriptan]      Severe face/neck/chest tightness and flushing side effects      Penicillins Hives     Unknown      Pork Allergy      Stomach pain, cramping, diarrhea, itching, nausea and headaches     Shellfish Allergy Hives and Swelling     Sulfa Drugs Hives and Swelling     Zithromax [Azithromycin Dihydrate] Swelling     Unknown          Review of Systems         Objective    /78 (BP Location: Right arm, Patient Position: Sitting, Cuff Size: Adult Regular)   Pulse (!) 121   Resp 16   Ht 1.6 m (5' 3\")   Wt 75.8 kg (167 lb)   SpO2 99%   BMI 29.58 kg/m    Body mass index is 29.58 kg/m .  Physical Exam   GENERAL: healthy, alert and no distress  HENT: ear canals and TM's normal, nose and mouth without ulcers or lesions except whitish coating on tongue  NECK: no adenopathy, no asymmetry, masses, or scars and thyroid normal to palpation  RESP: lungs clear to auscultation - no rales, rhonchi or wheezes  CV: regular rate and rhythm, normal S1 S2, no S3 or S4, no murmur, click or rub, no peripheral edema and peripheral pulses strong  ABDOMEN: soft, not distended but can be, very tender epig no rebound/guard, no hepatosplenomegaly, no masses and bowel sounds normal  MS: no gross musculoskeletal defects noted, no edema                "

## 2022-02-04 NOTE — NURSING NOTE
Janine Cornell is a 56 year old female patient that presents today in clinic for the following:    Chief Complaint   Patient presents with     Gastrointestinal Problem     Discuss GI issues that have been going on for a couple months     The patient's allergies and medications were reviewed as noted. A set of vitals were recorded as noted without incident. The patient does not have any other questions for the provider.    Michelle Hills, EMT at 2:47 PM on 2/4/2022

## 2022-02-05 LAB
BACTERIA UR CULT: NORMAL
CD19 CELLS # BLD: <1 CELLS/UL (ref 107–698)
CD19 CELLS NFR BLD: <1 % (ref 6–27)

## 2022-02-07 LAB
ANCA AB PATTERN SER IF-IMP: NORMAL
C-ANCA TITR SER IF: NORMAL {TITER}
IGA SERPL-MCNC: 222 MG/DL (ref 84–499)
IGG SERPL-MCNC: 628 MG/DL (ref 610–1616)
IGM SERPL-MCNC: 36 MG/DL (ref 35–242)

## 2022-02-08 ENCOUNTER — TELEPHONE (OUTPATIENT)
Dept: INTERNAL MEDICINE | Facility: CLINIC | Age: 57
End: 2022-02-08

## 2022-02-08 ENCOUNTER — ANCILLARY PROCEDURE (OUTPATIENT)
Dept: CT IMAGING | Facility: CLINIC | Age: 57
End: 2022-02-08
Attending: FAMILY MEDICINE
Payer: COMMERCIAL

## 2022-02-08 ENCOUNTER — OFFICE VISIT (OUTPATIENT)
Dept: FAMILY MEDICINE | Facility: CLINIC | Age: 57
End: 2022-02-08
Payer: COMMERCIAL

## 2022-02-08 ENCOUNTER — LAB (OUTPATIENT)
Dept: LAB | Facility: CLINIC | Age: 57
End: 2022-02-08
Payer: COMMERCIAL

## 2022-02-08 VITALS
TEMPERATURE: 98.1 F | RESPIRATION RATE: 16 BRPM | HEART RATE: 99 BPM | BODY MASS INDEX: 28.81 KG/M2 | OXYGEN SATURATION: 99 % | HEIGHT: 63 IN | SYSTOLIC BLOOD PRESSURE: 116 MMHG | DIASTOLIC BLOOD PRESSURE: 82 MMHG | WEIGHT: 162.6 LBS

## 2022-02-08 DIAGNOSIS — Z00.00 HEALTH CARE MAINTENANCE: ICD-10-CM

## 2022-02-08 DIAGNOSIS — R10.13 EPIGASTRIC PAIN: ICD-10-CM

## 2022-02-08 DIAGNOSIS — E11.9 TYPE 2 DIABETES, HBA1C GOAL < 8% (H): ICD-10-CM

## 2022-02-08 DIAGNOSIS — B37.0 THRUSH: ICD-10-CM

## 2022-02-08 DIAGNOSIS — R79.89 LOW TSH LEVEL: ICD-10-CM

## 2022-02-08 DIAGNOSIS — R11.0 NAUSEA: ICD-10-CM

## 2022-02-08 DIAGNOSIS — K59.00 CONSTIPATION, UNSPECIFIED CONSTIPATION TYPE: Primary | ICD-10-CM

## 2022-02-08 DIAGNOSIS — K59.00 CONSTIPATION, UNSPECIFIED CONSTIPATION TYPE: ICD-10-CM

## 2022-02-08 DIAGNOSIS — R21 RASH: ICD-10-CM

## 2022-02-08 LAB
ALBUMIN SERPL-MCNC: 4.2 G/DL (ref 3.4–5)
ALP SERPL-CCNC: 126 U/L (ref 40–150)
ALT SERPL W P-5'-P-CCNC: 31 U/L (ref 0–50)
ANION GAP SERPL CALCULATED.3IONS-SCNC: 12 MMOL/L (ref 3–14)
AST SERPL W P-5'-P-CCNC: 12 U/L (ref 0–45)
BASOPHILS # BLD AUTO: 0.1 10E3/UL (ref 0–0.2)
BASOPHILS NFR BLD AUTO: 0 %
BILIRUB SERPL-MCNC: 0.5 MG/DL (ref 0.2–1.3)
BUN SERPL-MCNC: 16 MG/DL (ref 7–30)
CALCIUM SERPL-MCNC: 10.3 MG/DL (ref 8.5–10.1)
CHLORIDE BLD-SCNC: 99 MMOL/L (ref 94–109)
CHOLEST SERPL-MCNC: 109 MG/DL
CO2 SERPL-SCNC: 25 MMOL/L (ref 20–32)
CREAT SERPL-MCNC: 1.17 MG/DL (ref 0.52–1.04)
EOSINOPHIL # BLD AUTO: 0.2 10E3/UL (ref 0–0.7)
EOSINOPHIL NFR BLD AUTO: 1 %
ERYTHROCYTE [DISTWIDTH] IN BLOOD BY AUTOMATED COUNT: 13 % (ref 10–15)
FASTING STATUS PATIENT QL REPORTED: NO
GFR SERPL CREATININE-BSD FRML MDRD: 55 ML/MIN/1.73M2
GLUCOSE BLD-MCNC: 332 MG/DL (ref 70–99)
HCT VFR BLD AUTO: 44.7 % (ref 35–47)
HDLC SERPL-MCNC: 38 MG/DL
HGB BLD-MCNC: 14.3 G/DL (ref 11.7–15.7)
IMM GRANULOCYTES # BLD: 0.1 10E3/UL
IMM GRANULOCYTES NFR BLD: 0 %
LDLC SERPL CALC-MCNC: 43 MG/DL
LIPASE SERPL-CCNC: 163 U/L (ref 73–393)
LYMPHOCYTES # BLD AUTO: 2.3 10E3/UL (ref 0.8–5.3)
LYMPHOCYTES NFR BLD AUTO: 15 %
MCH RBC QN AUTO: 25.1 PG (ref 26.5–33)
MCHC RBC AUTO-ENTMCNC: 32 G/DL (ref 31.5–36.5)
MCV RBC AUTO: 79 FL (ref 78–100)
MONOCYTES # BLD AUTO: 1.1 10E3/UL (ref 0–1.3)
MONOCYTES NFR BLD AUTO: 7 %
NEUTROPHILS # BLD AUTO: 11.9 10E3/UL (ref 1.6–8.3)
NEUTROPHILS NFR BLD AUTO: 77 %
NONHDLC SERPL-MCNC: 71 MG/DL
NRBC # BLD AUTO: 0 10E3/UL
NRBC BLD AUTO-RTO: 0 /100
PLATELET # BLD AUTO: 407 10E3/UL (ref 150–450)
POTASSIUM BLD-SCNC: 3.5 MMOL/L (ref 3.4–5.3)
PROT SERPL-MCNC: 8.1 G/DL (ref 6.8–8.8)
RBC # BLD AUTO: 5.69 10E6/UL (ref 3.8–5.2)
SODIUM SERPL-SCNC: 136 MMOL/L (ref 133–144)
T3 SERPL-MCNC: 117 NG/DL (ref 60–181)
TRIGL SERPL-MCNC: 141 MG/DL
WBC # BLD AUTO: 15.6 10E3/UL (ref 4–11)

## 2022-02-08 PROCEDURE — 83690 ASSAY OF LIPASE: CPT | Performed by: PATHOLOGY

## 2022-02-08 PROCEDURE — 74176 CT ABD & PELVIS W/O CONTRAST: CPT | Mod: GC | Performed by: RADIOLOGY

## 2022-02-08 PROCEDURE — 80053 COMPREHEN METABOLIC PANEL: CPT | Performed by: PATHOLOGY

## 2022-02-08 PROCEDURE — 80061 LIPID PANEL: CPT | Performed by: PATHOLOGY

## 2022-02-08 PROCEDURE — 36415 COLL VENOUS BLD VENIPUNCTURE: CPT | Performed by: PATHOLOGY

## 2022-02-08 PROCEDURE — 99215 OFFICE O/P EST HI 40 MIN: CPT | Performed by: FAMILY MEDICINE

## 2022-02-08 PROCEDURE — 84480 ASSAY TRIIODOTHYRONINE (T3): CPT | Mod: 90 | Performed by: PATHOLOGY

## 2022-02-08 PROCEDURE — 85025 COMPLETE CBC W/AUTO DIFF WBC: CPT | Performed by: PATHOLOGY

## 2022-02-08 RX ORDER — NYSTATIN 100000/ML
500000 SUSPENSION, ORAL (FINAL DOSE FORM) ORAL 4 TIMES DAILY
Qty: 200 ML | Refills: 0 | Status: SHIPPED | OUTPATIENT
Start: 2022-02-08 | End: 2022-08-19

## 2022-02-08 RX ORDER — POLYETHYLENE GLYCOL 3350 17 G/17G
1 POWDER, FOR SOLUTION ORAL DAILY
Qty: 507 G | Refills: 1 | Status: SHIPPED | OUTPATIENT
Start: 2022-02-08 | End: 2022-08-19

## 2022-02-08 RX ORDER — PRENATAL VIT 91/IRON/FOLIC/DHA 28-975-200
COMBINATION PACKAGE (EA) ORAL 2 TIMES DAILY
Qty: 42 G | Refills: 1 | Status: SHIPPED | OUTPATIENT
Start: 2022-02-08 | End: 2022-02-11

## 2022-02-08 RX ORDER — ONDANSETRON 8 MG/1
8 TABLET, ORALLY DISINTEGRATING ORAL EVERY 8 HOURS PRN
Qty: 20 TABLET | Refills: 1 | Status: SHIPPED | OUTPATIENT
Start: 2022-02-08 | End: 2022-02-08

## 2022-02-08 RX ORDER — ONDANSETRON 8 MG/1
8 TABLET, ORALLY DISINTEGRATING ORAL EVERY 8 HOURS PRN
Qty: 20 TABLET | Refills: 1 | Status: SHIPPED | OUTPATIENT
Start: 2022-02-08 | End: 2022-03-03

## 2022-02-08 RX ORDER — POLYETHYLENE GLYCOL 3350 17 G/17G
1 POWDER, FOR SOLUTION ORAL DAILY
Qty: 507 G | Refills: 1 | Status: SHIPPED | OUTPATIENT
Start: 2022-02-08 | End: 2022-02-08

## 2022-02-08 RX ORDER — NYSTATIN 100000/ML
500000 SUSPENSION, ORAL (FINAL DOSE FORM) ORAL 4 TIMES DAILY
Qty: 200 ML | Refills: 0 | Status: SHIPPED | OUTPATIENT
Start: 2022-02-08 | End: 2022-02-08

## 2022-02-08 RX ORDER — SUCRALFATE ORAL 1 G/10ML
1 SUSPENSION ORAL 4 TIMES DAILY
Qty: 1200 ML | Refills: 1 | Status: SHIPPED | OUTPATIENT
Start: 2022-02-08 | End: 2022-08-19

## 2022-02-08 ASSESSMENT — PAIN SCALES - GENERAL: PAINLEVEL: NO PAIN (0)

## 2022-02-08 ASSESSMENT — MIFFLIN-ST. JEOR: SCORE: 1301.68

## 2022-02-08 NOTE — PROGRESS NOTES
Assessment & Plan     Constipation, unspecified constipation type  Recent onset  - polyethylene glycol (MIRALAX) 17 GM/Dose powder; Take 17 g (1 capful) by mouth daily    Epigastric pain  Today, GI cocktail actually caused more pain. Cannot use contast (shellfish allergy)  - CBC with platelets differential; Future  - Comprehensive metabolic panel; Future  - Lipase; Future  - UA with Micro reflex to Culture; Future  - lidocaine (viscous) (XYLOCAINE) 2 % 15 mL, alum & mag hydroxide-simethicone (MAALOX) 15 mL GI Cocktail  - CT Abdomen/Pelvis w/o contrast; Future  - Adult Gastro Ref - Procedure Only; Future    Type 2 diabetes, HbA1C goal < 8% (H)  Due, needs help controlling  - Lipid panel reflex to direct LDL Fasting; Future  - PHARMACY ([facility]) REFERRAL    Rash  Will use prn  - terbinafine (LAMISIL) 1 % external cream; Apply topically 2 times daily    Thrush  Try clinically c/w thrush, at risk w/ DM  - nystatin (MYCOSTATIN) 092284 UNIT/ML suspension; Take 5 mLs (500,000 Units) by mouth 4 times daily    Health care maintenance  EGD for symptoms, start PPI continue sucralfate (which helps partly), do colonoscopy too  - Adult Gastro Ref - Procedure Only; Future    Low TSH level  See HPI  - T3 total; Future    Nausea  Add   - ondansetron (ZOFRAN-ODT) 8 MG ODT tab; Take 1 tablet (8 mg) by mouth every 8 hours as needed for nausea    In visit, given severe symptoms and lack of PO I offered to send her to ER, get IVF and further eval perhaps admit. She is hesitant to go to ER/inpt. She did agree to contiue carafate, start taking the PPI, and set up EGD/colonoscopy.    After labs back I asked RN to call and suggest same-go to ER- as we now see she is down 4.5 lbs in four days (wt was not availabe till after I saw her), and now bmp suggests dehydration (did not last week). I don't have explanation for the leukocytosis.        65 minutes spent on the date of the encounter doing chart review, history and exam, documentation  "and further activities per the note    Tobacco Cessation:   reports that she has been smoking cigarettes. She has a 0.20 pack-year smoking history. She has never used smokeless tobacco.    BMI:   Estimated body mass index is 28.8 kg/m  as calculated from the following:    Height as of this encounter: 1.6 m (5' 3\").    Weight as of this encounter: 73.8 kg (162 lb 9.6 oz).     Kash Solano MD  [facility] [place]    Adrienne Mehta is a 56 year old who presents for the following health issues     HPI Here w/ son.  Pain about same, epigastric to back. Did not try PPI yet, read package insert was afraid to. Some nausea no vomit. Any po intake hurts epigastrically. No fever. No cough or cold symptoms, discussed. H/o pancreatitis, first about fifteen years ago.    No stool last three days. No red blood or melena of stool. Usually not constipated.     DM: poor control, discussed. On Lantus 62 units/day.    Due for routine colonoscopy    Down 4.5 lbs in four days    Also notes white coat on tongue, rashes that come/go under breasts/on feet. Tried Diflucan from outside clinic without relief.    Got Pfizer shot last week. Left shoulder. Pain in shoulder since.    Slightly low tsh last week, normal t4, will do t3.    No acute UTI symptoms, discussed,     Did not do h pylori test yet    Last visit GI cocktail helped significantly for a few hours.    Past Medical History:   Diagnosis Date     Abnormal glandular Papanicolaou smear of cervix 1992     Allergic rhinitis     Allergy, airborne subst     Arthritis      ASCVD (arteriosclerotic cardiovascular disease)      Chronic pain      Chronic pancreatitis (H)     idiopathic, Tx: PPI, antioxidants, pancreatic enzymes     Common migraine      Coronary artery disease      Costochondritis      Difficulty in walking(719.7)      Dyspnea on exertion      Ectasia, mammary duct     [facility], [facility] nipple discharge     Elevated " fasting glucose      Gastro-oesophageal reflux disease      Granulomatosis with polyangiitis (H)      Hernia      History of angina      Hyperlipidemia LDL goal < 100      Hypertension goal BP (blood pressure) < 140/90     Essential hypertension     Iron deficiency anemia      Ischemic cardiomyopathy      Menorrhagia      Migraine headaches      Mild persistent asthma      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 Matias, neg w/u, Rx'd w/prednisone     Ventral hernia, unspecified, without mention of obstruction or gangrene 2012     Past Surgical History:   Procedure Laterality Date     C/SECTION, LOW TRANSVERSE           CARDIAC SURGERY      cardiac stent- recent in 16; 4 other stents     DILATION AND CURETTAGE N/A 2016    Procedure: DILATION AND CURETTAGE;  Surgeon: Nahed Butler MD;  Location: [facility] OR     [facility] UGI ENDOSCOPY W EUS  08    Dr. Pastrana, possible chronic pancreatitis, fatty liver     [place] [facility] [place] INTRAUTERINE DEVICE N/A 2016    Procedure: INSERT INTRAUTERINE DEVICE;  Surgeon: Nahed Butler MD;  Location: [facility]     INT UTERINE FIBRIOD EMBOLIZATION  10/29/2014     LAPAROSCOPIC CHOLECYSTECTOMY  08    Dr. Arnol GRUBBS TX, CERVICAL      s/p LEEP     ORBITOTOMY Right 3/15/2016     Procedure: ORBITOTOMY;  Surgeon: Myron Cyr MD;  Location: [facility]     ORBITOTOMY Right 8/4/2017    Procedure: ORBITOTOMY;  RIGHT ORBITOTOMY AND BIOPSY;  Surgeon: Charis Holbrook MD;  Location: [facility]     REPAIR PTOSIS Right 11/17/2017    Procedure: REPAIR PTOSIS;  RIGHT UPPER LID PTOSIS REPAIR;  Surgeon: Myron Cyr MD;  Location:  [facility]     UPPER GI ENDOSCOPY  10/21/08    mild gastritis, Dr. Rocky [facility] E[facility] HEART XTHORACIC,COMPLETE, W/O DOPPLER  2/4/04    [facility] WNL, EF 60%     Current Outpatient Medications   Medication     acetaminophen (TYLENOL) 325 MG tablet     albuterol (2.5 MG/3ML) 0.083% neb solution     albuterol (PROAIR HFA, PROVENTIL HFA, VENTOLIN HFA) 108 (90 BASE) MCG/ACT inhaler     aspirin (ASPIRIN LOW DOSE) 81 MG EC tablet     blood glucose monitoring (NO BRAND SPECIFIED) meter device kit     blood glucose monitoring (NO BRAND SPECIFIED) test strip     Blood Pressure Monitor KIT     calcium carbonate (TUMS) 500 MG chewable tablet     clopidogrel (PLAVIX) 75 MG tablet     COMPRESSION STOCKINGS     cyclobenzaprine (FLEXERIL) 10 MG tablet     dicyclomine (BENTYL) 20 MG tablet     diphenhydrAMINE (BENADRYL) 25 MG capsule     EPIPEN 2-RIKY 0.3 MG/0.3ML injection     Ergocalciferol (VITAMIN D2) 50 MCG (2000 UT) TABS     esomeprazole (NEXIUM) 40 MG DR capsule     famotidine (PEPCID) 20 MG tablet     fexofenadine (ALLEGRA) 180 MG tablet     fluocinolone (SYNALAR) 0.01 % solution     fluocinonide (LIDEX) 0.05 % external ointment     fluticasone-vilanterol (BREO ELLIPTA) 200-25 MCG/INH inhaler     folic acid (FOLVITE) 1 MG tablet     insulin glargine (LANTUS PEN) 100 UNIT/ML pen     insulin pen needle (BD MARCK U/F) 32G X 4 MM     ketoconazole (NIZORAL) 2 % shampoo     lisinopril-hydrochlorothiazide (ZESTORETIC) 20-25 MG tablet     magnesium 250 MG tablet     metFORMIN (GLUCOPHAGE-XR) 500 MG 24 hr tablet     metoprolol succinate ER (TOPROL-XL) 100 MG 24 hr tablet      "montelukast (SINGULAIR) 10 MG tablet     Multiple Vitamin (DAILY-GERALD) TABS     nitroGLYcerin (NITROSTAT) 0.4 MG sublingual tablet     nystatin (MYCOSTATIN) 709141 UNIT/ML suspension     ondansetron (ZOFRAN-ODT) 4 MG ODT tab     ondansetron (ZOFRAN-ODT) 8 MG ODT tab     polyethylene glycol (MIRALAX) 17 GM/Dose powder     polyethylene glycol (MIRALAX) 17 GM/Dose powder     pravastatin (PRAVACHOL) 40 MG tablet     sennosides (SENOKOT) 8.6 MG tablet     spironolactone (ALDACTONE) 25 MG tablet     sucralfate (CARAFATE) 1 GM/10ML suspension     SYMBICORT 80-4.5 MCG/ACT Inhaler     terbinafine (LAMISIL) 1 % external cream     traMADol (ULTRAM) 50 MG tablet     vitamin D2 (ERGOCALCIFEROL) 89732 units (1250 mcg) capsule     vitamin D3 (CHOLECALCIFEROL) 50 mcg (2000 units) tablet     No current facility-administered medications for this visit.     Allergies   Allergen Reactions     Amoxicillin-Pot Clavulanate      Augmentin      Unknown possible hives and edema     Azithromycin      Diatrizoate Other (See Comments)     Pt wants this listed because she is allergic to shellfish      Imitrex [Sumatriptan]      Severe face/neck/chest tightness and flushing side effects      Penicillins Hives     Unknown      Pork Allergy      Stomach pain, cramping, diarrhea, itching, nausea and headaches     Shellfish Allergy Hives and Swelling     Sulfa Drugs Hives and Swelling     Zithromax [Azithromycin Dihydrate] Swelling     Unknown                      Review of Systems         Objective    /82   Pulse 99   Temp 98.1  F (36.7  C) (Oral)   Resp 16   Ht 1.6 m (5' 3\")   Wt 73.8 kg (162 lb 9.6 oz)   SpO2 99%   BMI 28.80 kg/m    Body mass index is 28.8 kg/m .  Physical Exam   GENERAL: awake, alert, holds abdomen off and on  ABDOMEN: soft, tender epigastrium no rebound or guarding, no hepatosplenomegaly, no masses and bowel sounds normal  MS: no gross musculoskeletal defects noted, no edema  Oral white coat on tongue  Skin; no rash " now

## 2022-02-08 NOTE — TELEPHONE ENCOUNTER
Pt was informed the results and recommendations. She is reluctant to go to ED again, saying she has some important personal things to do. But it having worsening sxs, she will go to ED.    [facility]  -------------------------------------------------------------------------------------        ----- Message from Kash Solano MD sent at 2/8/2022 12:52 PM CST -----  Can you call her    I saw her Friday and today    Nothing on CT to explain pain    WBC up, but I have not found infection to explain it    Her wt is down 4.5 lbs since last Friday, and now kidney blood test shows mild dehydration signs it did not show last week; this is all consistent w/ lack of food/fluids due to pain she told me about    I discussed today w/ her could go to ER, she is hesitant to. She was going to continue sucralfate, add Nexium, and set up EGD/colonoscopy I ordered.     Given labs showing some dehydration and the severity of her symtpoms, she could go to ER, possibly get admitted for a day or two and get tests done inpatient. Suggest that. If not, go prn in meantime, she has appt w/ me in a week.

## 2022-02-08 NOTE — NURSING NOTE
Chief Complaint   Patient presents with     Recheck Medication     Patient comes in for a follow up.         Luis Manuel Pugh MA on 2/8/2022 at 7:36 AM

## 2022-02-09 ENCOUNTER — TELEPHONE (OUTPATIENT)
Dept: FAMILY MEDICINE | Facility: CLINIC | Age: 57
End: 2022-02-09
Payer: COMMERCIAL

## 2022-02-09 DIAGNOSIS — R21 RASH: ICD-10-CM

## 2022-02-09 NOTE — TELEPHONE ENCOUNTER
[facility] Call Center    Phone Message    May a detailed message be left on voicemail: yes     Reason for Call: Other: Patient was calling regarding her appointment the other day and medications that were discussed. Please call back.     Action Taken: Message routed to:  [facility]    Travel Screening: Not Applicable

## 2022-02-11 ENCOUNTER — TELEPHONE (OUTPATIENT)
Dept: RHEUMATOLOGY | Facility: CLINIC | Age: 57
End: 2022-02-11
Payer: COMMERCIAL

## 2022-02-11 ENCOUNTER — TELEPHONE (OUTPATIENT)
Dept: GASTROENTEROLOGY | Facility: CLINIC | Age: 57
End: 2022-02-11
Payer: COMMERCIAL

## 2022-02-11 RX ORDER — PRENATAL VIT 91/IRON/FOLIC/DHA 28-975-200
COMBINATION PACKAGE (EA) ORAL 2 TIMES DAILY
Qty: 42 G | Refills: 1 | Status: SHIPPED | OUTPATIENT
Start: 2022-02-11 | End: 2024-06-05

## 2022-02-11 NOTE — TELEPHONE ENCOUNTER
Screening Questions  Blue=prep questions Red=location Green=sedation   1. Are you active on mychart? n    2. What insurance is in the chart? [employer]     3.  Ordering/Referring Provider: pedro    4. BMI 28.8, If greater than 40 review exclusion criteria also will need EXTENDED PREP    5.  Respiratory Screening (If yes to any of the following HOSPITAL setting only):     Do you use daily home oxygen? n  Do you have mod to severe Obstructive Sleep Apnea? n (can be seen at [facility] or hospital setting)    Do you have Pulmonary Hypertension? n   Do you have UNCONTROLLED asthma? n    6. Have you had a heart or lung transplant? n  (If yes, please review exclusion criteria)    7. Are you currently on dialysis?n  (If yes, schedule in HOSPITAL setting only)(If yes, please send Golytely prep)    8. Do you have chronic kidney disease? n (If yes, please send Golytely prep)    9. Have you had a stroke or Transient ischemic attack (TIA) within 6 months? n (If yes, do not schedule at [facility])    10. In the past 6 months, have you had any heart related issues including cardiomyopathy or heart attack? n (If yes, please review exclusion criteria)           If yes, did it require cardiac stenting or other implantable device?n  (If yes, please review exclusion criteria)      11. Do you have any implantable devices in your body (pacemaker, defib, LVAD)? n (If yes, schedule at [facility])    12. Do you take nitroglycerin? If yes, how often? y as needed (if yes, schedule at HOSPITAL setting)    13. Are you currently taking any blood thinners?clabitagril (If yes- inform patient to follow up with PCP or provider for follow up instructions)     14. Are you a diabetic? y (If yes, please send Golytely prep)    15. (Females) Are you currently pregnant?   If yes, how many weeks?      16. Are you taking any prescription pain medications on a routine schedule? tramadol If yes, MAC sedation and patient will need EXTENDED PREP.    17. Do you have any chemical  dependencies such as alcohol, street drugs, or methadone? n If yes, MAC sedation     18. Do you have any history of post-traumatic stress syndrome, severe anxiety or history of psychosis? ptsd  If yes, MAC sedation.     19. Do you transfer independently? At times    20.  Do you have any issues with constipation? n   If yes, pt will need EXTENDED PREP     21. Preferred Pharmacy for Pre Prescription     Scheduling Details    Which Colonoscopy Prep was Sent?:   Type of Procedure Scheduled:   Surgeon:   Date of Procedure:   Location:   Caller (Please ask for phone number if not scheduled by patient):       Sedation Type:   Conscious Sedation- Needs  for 6 hours after the procedure  MAC/General-Needs  for 24 hours after procedure    Pre-op Required at [facility] [place], [place], [place] and OR for MAC sedation:   (if yes advise patient they will need a pre-op prior to procedure)      Informed patient they will need an adult    Cannot take any type of public or medical transportation alone    Pre-Procedure Covid test to be completed at [facility] or Externally:     Confirmed Nurse will call to complete assessment     Additional comments: Have nurse look over patients chart before scheduling to determine what location to schedule the patient at due to the patient not sure of some screening questions ([facility] NEED EXTENDED PREP)

## 2022-02-11 NOTE — TELEPHONE ENCOUNTER
Pt was informed that pharmacy will deliver the medications to her TODAY.    Endoscopy dept called the pt for the scheduling, but she told them to call later.    She was informed the recommendations as below. She agreed to have [facility] referral as her blood sugar should be controlled.    She will keep the appt on 2/15/22.

## 2022-02-11 NOTE — TELEPHONE ENCOUNTER
[facility] Call Center    Phone Message    May a detailed message be left on voicemail: yes     Reason for Call: Other: Pt is still waiting for [facility].  Pt stated the pharmacy cuts the delivery off at 3pm. Please call to discuss further     Action Taken: Message routed to:  [facility]    Travel Screening: Not Applicable

## 2022-02-11 NOTE — TELEPHONE ENCOUNTER
I think at last appt I gave her oral nysatatin to swish and spit for thrush, and it looks like we've now sent in topical lamisil. She'd told me that she'd tried Diflucan and it did not help, otherwise I would have given it to her to try.   The biggest thing is her sugars are much higher, and yeast is harder to manage kat blood sugar is high. She has a referral to pharmD but no appt, help her set one up if you can.   My thought was to try the nystatin for the mouth yeast, and the lamisil topical she can use for any spots on her body, meanwhile trying to get blood sugars down.

## 2022-02-11 NOTE — TELEPHONE ENCOUNTER
Pt called into clinic, she was upset as the infusion nurses could not answer her questions about the covid vaccine and when she should get her rituiximab in relation to the 2nd vaccine.      Pt got her first vaccine on 2/4/22 and is due for her second on 2/25.  She is also due for her Rituximab on 2/24.  She is currently struggling with arm pain and feels that the rituximab will help that.  She feels that she may want to wait for the second covid vaccine until 2 weeks after she gets Rituximab.    We discussed that the best chance of her building antibodies is for her to get the vaccine and then Rituximab 2 weeks after. If she does it the other way around, she likely will not build antibodies at all.  She is also aware that due to being on rituximab that she may not build antibodies at all. We did discuss the Evusheld a little but she was not receptive to that information.    Let her know it is ultimately up to her what she wants to do.  She was not crazy about getting the vaccine in the first place.     Desiree Godinez RN  [facility]

## 2022-02-11 NOTE — CONFIDENTIAL NOTE
Left a message to the pt to call me back.  She may leave the detailed message to the call center for the prompt response. I will keep an eye on the message.

## 2022-02-11 NOTE — TELEPHONE ENCOUNTER
[facility] Call Center    Phone Message    May a detailed message be left on voicemail: yes     Reason for Call: Other: Patient called regarding questions she has on medication that was discussed at the last appointment but has not been sent out yet. She has other questions as well but would not state what they were.     Action Taken: Message routed to:  [facility]    Travel Screening: Not Applicable

## 2022-02-11 NOTE — TELEPHONE ENCOUNTER
Dr. Solano    Did she talk about her yeast infection on 2/8/22 ?  She want the Rx for this. Thank you.    [facility]

## 2022-02-14 PROCEDURE — 99000 SPECIMEN HANDLING OFFICE-LAB: CPT | Performed by: PATHOLOGY

## 2022-02-14 PROCEDURE — 87338 HPYLORI STOOL AG IA: CPT | Mod: 90 | Performed by: PATHOLOGY

## 2022-02-15 ENCOUNTER — LAB (OUTPATIENT)
Dept: LAB | Facility: CLINIC | Age: 57
End: 2022-02-15
Payer: COMMERCIAL

## 2022-02-15 ENCOUNTER — OFFICE VISIT (OUTPATIENT)
Dept: FAMILY MEDICINE | Facility: CLINIC | Age: 57
End: 2022-02-15
Payer: COMMERCIAL

## 2022-02-15 ENCOUNTER — OFFICE VISIT (OUTPATIENT)
Dept: ORTHOPEDICS | Facility: CLINIC | Age: 57
End: 2022-02-15
Payer: COMMERCIAL

## 2022-02-15 VITALS
OXYGEN SATURATION: 99 % | WEIGHT: 165.4 LBS | SYSTOLIC BLOOD PRESSURE: 99 MMHG | BODY MASS INDEX: 29.3 KG/M2 | HEART RATE: 86 BPM | RESPIRATION RATE: 16 BRPM | HEIGHT: 63 IN | DIASTOLIC BLOOD PRESSURE: 67 MMHG | TEMPERATURE: 97.8 F

## 2022-02-15 VITALS — BODY MASS INDEX: 29.23 KG/M2 | HEIGHT: 63 IN | WEIGHT: 165 LBS

## 2022-02-15 DIAGNOSIS — R10.13 EPIGASTRIC PAIN: Primary | ICD-10-CM

## 2022-02-15 DIAGNOSIS — E83.52 SERUM CALCIUM ELEVATED: ICD-10-CM

## 2022-02-15 DIAGNOSIS — M25.512 ACUTE PAIN OF LEFT SHOULDER: Primary | ICD-10-CM

## 2022-02-15 DIAGNOSIS — E86.0 DEHYDRATION: ICD-10-CM

## 2022-02-15 DIAGNOSIS — E11.9 TYPE 2 DIABETES, HBA1C GOAL < 8% (H): ICD-10-CM

## 2022-02-15 DIAGNOSIS — R10.13 EPIGASTRIC PAIN: ICD-10-CM

## 2022-02-15 DIAGNOSIS — N76.0 VAGINITIS AND VULVOVAGINITIS: ICD-10-CM

## 2022-02-15 DIAGNOSIS — M79.622 PAIN OF LEFT UPPER ARM: ICD-10-CM

## 2022-02-15 DIAGNOSIS — R79.89 LOW TSH LEVEL: ICD-10-CM

## 2022-02-15 DIAGNOSIS — D72.829 LEUKOCYTOSIS, UNSPECIFIED TYPE: ICD-10-CM

## 2022-02-15 LAB
ALBUMIN UR-MCNC: NEGATIVE MG/DL
ANION GAP SERPL CALCULATED.3IONS-SCNC: 9 MMOL/L (ref 3–14)
APPEARANCE UR: CLEAR
BASOPHILS # BLD AUTO: 0.1 10E3/UL (ref 0–0.2)
BASOPHILS NFR BLD AUTO: 1 %
BILIRUB UR QL STRIP: NEGATIVE
BUN SERPL-MCNC: 20 MG/DL (ref 7–30)
CA-I BLD-MCNC: 5.1 MG/DL (ref 4.4–5.2)
CALCIUM SERPL-MCNC: 9.8 MG/DL (ref 8.5–10.1)
CHLORIDE BLD-SCNC: 95 MMOL/L (ref 94–109)
CO2 SERPL-SCNC: 28 MMOL/L (ref 20–32)
COLOR UR AUTO: YELLOW
CREAT SERPL-MCNC: 1 MG/DL (ref 0.52–1.04)
EOSINOPHIL # BLD AUTO: 0.3 10E3/UL (ref 0–0.7)
EOSINOPHIL NFR BLD AUTO: 2 %
ERYTHROCYTE [DISTWIDTH] IN BLOOD BY AUTOMATED COUNT: 12.7 % (ref 10–15)
GFR SERPL CREATININE-BSD FRML MDRD: 66 ML/MIN/1.73M2
GLUCOSE BLD-MCNC: 383 MG/DL (ref 70–99)
GLUCOSE UR STRIP-MCNC: >499 MG/DL
HCT VFR BLD AUTO: 40.1 % (ref 35–47)
HGB BLD-MCNC: 13 G/DL (ref 11.7–15.7)
HGB UR QL STRIP: NEGATIVE
IMM GRANULOCYTES # BLD: 0.1 10E3/UL
IMM GRANULOCYTES NFR BLD: 0 %
KETONES UR STRIP-MCNC: NEGATIVE MG/DL
LEUKOCYTE ESTERASE UR QL STRIP: ABNORMAL
LYMPHOCYTES # BLD AUTO: 1.6 10E3/UL (ref 0.8–5.3)
LYMPHOCYTES NFR BLD AUTO: 11 %
MCH RBC QN AUTO: 25.4 PG (ref 26.5–33)
MCHC RBC AUTO-ENTMCNC: 32.4 G/DL (ref 31.5–36.5)
MCV RBC AUTO: 78 FL (ref 78–100)
MONOCYTES # BLD AUTO: 0.6 10E3/UL (ref 0–1.3)
MONOCYTES NFR BLD AUTO: 4 %
NEUTROPHILS # BLD AUTO: 12.2 10E3/UL (ref 1.6–8.3)
NEUTROPHILS NFR BLD AUTO: 82 %
NITRATE UR QL: NEGATIVE
NRBC # BLD AUTO: 0 10E3/UL
NRBC BLD AUTO-RTO: 0 /100
PH UR STRIP: 5 [PH] (ref 5–7)
PLATELET # BLD AUTO: 459 10E3/UL (ref 150–450)
POTASSIUM BLD-SCNC: 3.9 MMOL/L (ref 3.4–5.3)
RBC # BLD AUTO: 5.12 10E6/UL (ref 3.8–5.2)
RBC URINE: 2 /HPF
SODIUM SERPL-SCNC: 132 MMOL/L (ref 133–144)
SP GR UR STRIP: 1.02 (ref 1–1.03)
SQUAMOUS EPITHELIAL: <1 /HPF
UROBILINOGEN UR STRIP-MCNC: NORMAL MG/DL
WBC # BLD AUTO: 14.9 10E3/UL (ref 4–11)
WBC URINE: 5 /HPF

## 2022-02-15 PROCEDURE — 85025 COMPLETE CBC W/AUTO DIFF WBC: CPT | Performed by: PATHOLOGY

## 2022-02-15 PROCEDURE — 80048 BASIC METABOLIC PNL TOTAL CA: CPT | Performed by: PATHOLOGY

## 2022-02-15 PROCEDURE — 81001 URINALYSIS AUTO W/SCOPE: CPT | Performed by: PATHOLOGY

## 2022-02-15 PROCEDURE — 36415 COLL VENOUS BLD VENIPUNCTURE: CPT | Performed by: PATHOLOGY

## 2022-02-15 PROCEDURE — 99203 OFFICE O/P NEW LOW 30 MIN: CPT | Performed by: FAMILY MEDICINE

## 2022-02-15 PROCEDURE — 99215 OFFICE O/P EST HI 40 MIN: CPT | Performed by: FAMILY MEDICINE

## 2022-02-15 PROCEDURE — 82330 ASSAY OF CALCIUM: CPT | Performed by: PATHOLOGY

## 2022-02-15 RX ORDER — TERCONAZOLE 80 MG/1
80 SUPPOSITORY VAGINAL AT BEDTIME
Qty: 3 SUPPOSITORY | Refills: 1 | Status: SHIPPED | OUTPATIENT
Start: 2022-02-15 | End: 2022-08-19

## 2022-02-15 ASSESSMENT — MIFFLIN-ST. JEOR
SCORE: 1314.38
SCORE: 1312.57

## 2022-02-15 ASSESSMENT — PAIN SCALES - GENERAL: PAINLEVEL: NO PAIN (0)

## 2022-02-15 NOTE — NURSING NOTE
Chief Complaint   Patient presents with     RECHECK     Patient comes in for a follow up for GI issues.         Luis Manuel Puhg MA on 2/15/2022 at 10:42 AM

## 2022-02-15 NOTE — LETTER
2/15/2022      RE: Janine Cornell  [address]  [facility] [address]       CHIEF COMPLAINT:  Pain of the Right Arm       HISTORY OF PRESENT ILLNESS  Ms. Cornell is a pleasant 56 year old year old female who presents to clinic today with right arm pain.  Janine explains that she received the pfizer vaccine on 2/4/22 and has noticed shoulder and elbow pain    Onset: gradual  Location: left shoulder and left elbow  Quality:  constant  Duration: 11 days   Severity: 7/10 at worst  Timing:constant  Modifying factors:  resting and non-use makes it better, movement and use makes it worse  Associated signs & symptoms: pain  Previous similar pain: No  Treatments to date: heat, ice, tramadol     Additional history: as documented    Review of Systems:    Have you recently had a a fever, chills, weight loss? No    Do you have any vision problems? No    Do you have any chest pain or edema? No    Do you have any shortness of breath or wheezing?  Yes, asthma     Do you have stomach problems? No    Do you have any numbness or focal weakness? Yes, arm    Do you have diabetes? Yes, type 2    Do you have problems with bleeding or clotting? No    Do you have an rashes or other skin lesions? No    MEDICAL HISTORY  Patient Active Problem List   Diagnosis     Seasonal affective disorder (H)     Allergic rhinitis     PCOS (polycystic ovarian syndrome)     Moderate persistent asthma     Chronic pancreatitis (H)     Hypertension goal BP (blood pressure) < 140/90     Common migraine     NSTEMI (non-ST elevated myocardial infarction) (H)     Hyperlipidemia LDL goal <70     ASCVD (arteriosclerotic cardiovascular disease)     GERD (gastroesophageal reflux disease)     Ischemic cardiomyopathy     Hypertensive heart disease     Uterine leiomyoma     Iron deficiency anemia     Costochondritis     Vitamin D deficiency     Breast cancer screening     Spinal stenosis in cervical region     Fibromyalgia     Seronegative rheumatoid arthritis (H)      Type 2 diabetes, HbA1C goal < 8% (H)     Type 2 diabetes mellitus with other specified complication (H)     Hyperlipidemia associated with type 2 diabetes mellitus (H)     Hypertension associated with diabetes (H)     Overweight with body mass index (BMI) 25.0-29.9     Tobacco use disorder     Telogen effluvium     CAD S/P percutaneous coronary angioplasty     Status post coronary angiogram     ANCA-associated vasculitis (H)     Wegener's vasculitis     Type 1 diabetes mellitus with complications (H)     Chest discomfort     Urinary frequency     Abdominal pain, epigastric     Hypokalemia     Leukocytosis, unspecified type     Acute pancreatitis, unspecified complication status, unspecified pancreatitis type       Current Outpatient Medications   Medication Sig Dispense Refill     acetaminophen (TYLENOL) 325 MG tablet Take 1-2 tablets (325-650 mg) by mouth every 6 hours as needed for pain (headache) 250 tablet 4     albuterol (2.5 MG/3ML) 0.083% neb solution INL 1 VIAL VIA NEBULIZATION Q 4 TO 6 HOURS PRN  1     albuterol (PROAIR HFA, PROVENTIL HFA, VENTOLIN HFA) 108 (90 BASE) MCG/ACT inhaler Inhale 2 puffs into the lungs every 6 hours as needed for shortness of breath / dyspnea or wheezing 3 Inhaler 1     aspirin (ASPIRIN LOW DOSE) 81 MG EC tablet Take 1 tablet (81 mg) by mouth daily . 30-day refills only. 30 tablet 11     blood glucose monitoring (NO BRAND SPECIFIED) meter device kit Use to test blood sugar 4 X times daily or as directed. (Patient taking differently: 1 kit Use to test blood sugar 4 X times daily or as directed.) 1 kit 0     blood glucose monitoring (NO BRAND SPECIFIED) test strip 1 strip by In Vitro route 4 times daily Test as directed. Please dispense three months and three months of refills. Thank you. (Patient taking differently: 1 strip by In Vitro route 4 times daily Test as directed. Please dispense three months and three months of refills. Thank you.) 360 each 3     Blood Pressure Monitor  KIT 1 each daily Monitor home blood pressure as instructed by physician.  Dispense [place] blood pressure kit. 1 kit 0     calcium carbonate (TUMS) 500 MG chewable tablet Take 3-4 tablets (1,500-2,000 mg) by mouth daily as needed 90 tablet 3     clopidogrel (PLAVIX) 75 MG tablet Take 1 tablet (75 mg) by mouth daily . 30-day refills only. 30 tablet 11     COMPRESSION STOCKINGS 2 each daily Apply one 10-15 mmHg compression stocking to each lower extgmierty during the day and remove before bedtime. 4 each 2     cyclobenzaprine (FLEXERIL) 10 MG tablet Take 1 tablet (10 mg) by mouth 2 times daily as needed for muscle spasms 60 tablet 3     dicyclomine (BENTYL) 20 MG tablet TAKE 1 TABLET(20 MG) BY MOUTH FOUR TIMES DAILY AS NEEDED. Pls schedule clinic appt for refills. 60 tablet 0     diphenhydrAMINE (BENADRYL) 25 MG capsule Take 1 capsule (25 mg) by mouth nightly as needed for itching or allergies 30 capsule 2     EPIPEN 2-RIKY 0.3 MG/0.3ML injection INJECT 0.3 MG INTO THE MUSCLE PRF ANAPHYALAXIS  0     Ergocalciferol (VITAMIN D2) 50 MCG (2000 UT) TABS Take 2,000 Units by mouth daily 90 tablet 1     esomeprazole (NEXIUM) 40 MG DR capsule Take 1 capsule (40 mg) by mouth every morning (before breakfast) Take 30-60 minutes before eating. 30 capsule 3     famotidine (PEPCID) 20 MG tablet Take 1 tablet (20 mg) by mouth 2 times daily as needed (abdominal pain) 20 tablet 0     fexofenadine (ALLEGRA) 180 MG tablet Take 1 tablet by mouth daily as needed. 90 tablet 3     fluocinolone (SYNALAR) 0.01 % solution Apply topically daily as needed        fluocinonide (LIDEX) 0.05 % external ointment Apply topically as needed        fluticasone-vilanterol (BREO ELLIPTA) 200-25 MCG/INH inhaler Inhale 1 puff into the lungs daily        folic acid (FOLVITE) 1 MG tablet Take 1 tablet by mouth daily 90 tablet 3     insulin glargine (LANTUS PEN) 100 UNIT/ML pen Inject 58 Units Subcutaneous every morning       insulin pen needle (BD MARCK U/F) 32G X  4 MM USE DAILY OR AS DIRECTED 300 each 3     ketoconazole (NIZORAL) 2 % shampoo Apply topically daily as needed        lisinopril-hydrochlorothiazide (ZESTORETIC) 20-25 MG tablet Take 1 tablet by mouth daily . 30-day refills only. 30 tablet 11     magnesium 250 MG tablet TAKE 1 TABLET(250 MG) BY MOUTH TWICE DAILY 60 tablet 1     metFORMIN (GLUCOPHAGE-XR) 500 MG 24 hr tablet Take 2,000 mg by mouth daily (with dinner)       metoprolol succinate ER (TOPROL-XL) 100 MG 24 hr tablet Take 1 tablet (100 mg) by mouth daily . 30-day refills only. 30 tablet 11     montelukast (SINGULAIR) 10 MG tablet Take 1 tablet (10 mg) by mouth At Bedtime 30 tablet 1     Multiple Vitamin (DAILY-GERALD) TABS Take 1 tablet by mouth daily 90 tablet 0     nitroGLYcerin (NITROSTAT) 0.4 MG sublingual tablet Place 1 tablet (0.4 mg) under the tongue every 5 minutes as needed for chest pain . After 3 doses, if pain persists call 911. 25 tablet 3     nystatin (MYCOSTATIN) 414938 UNIT/ML suspension Take 5 mLs (500,000 Units) by mouth 4 times daily 200 mL 0     ondansetron (ZOFRAN-ODT) 4 MG ODT tab Take 1 tablet (4 mg) by mouth every 8 hours as needed for nausea 12 tablet 0     ondansetron (ZOFRAN-ODT) 8 MG ODT tab Take 1 tablet (8 mg) by mouth every 8 hours as needed for nausea 20 tablet 1     polyethylene glycol (MIRALAX) 17 GM/Dose powder Take 17 g (1 capful) by mouth daily 507 g 1     pravastatin (PRAVACHOL) 40 MG tablet Take 1 tablet (40 mg) by mouth daily . 30-day refills only. 30 tablet 11     sennosides (SENOKOT) 8.6 MG tablet 1-2 tabs a day as needed for constipation 60 tablet 1     spironolactone (ALDACTONE) 25 MG tablet Take 1 tablet (25 mg) by mouth daily . Take one additional 0.5 tab daily as needed for weight gain. 45 day refills. 45 tablet 11     sucralfate (CARAFATE) 1 GM/10ML suspension Take 10 mLs (1 g) by mouth 4 times daily 1200 mL 1     SYMBICORT 80-4.5 MCG/ACT Inhaler        terbinafine (LAMISIL) 1 % external cream Apply topically 2  times daily 42 g 1     terconazole (TERAZOL 3) 80 MG vaginal suppository Place 1 suppository (80 mg) vaginally At Bedtime 3 suppository 1     traMADol (ULTRAM) 50 MG tablet TAKE 1 TABLET(50 MG) BY MOUTH EVERY 8 HOURS AS NEEDED FOR MODERATE PAIN 60 tablet 3     vitamin D2 (ERGOCALCIFEROL) 34240 units (1250 mcg) capsule Take 1 capsule (50,000 Units) by mouth once a week 12 capsule 0     vitamin D3 (CHOLECALCIFEROL) 50 mcg (2000 units) tablet Take 1 tablet (50 mcg) by mouth daily 90 tablet 1       Allergies   Allergen Reactions     Amoxicillin-Pot Clavulanate      Augmentin      Unknown possible hives and edema     Azithromycin      Diatrizoate Other (See Comments)     Pt wants this listed because she is allergic to shellfish      Imitrex [Sumatriptan]      Severe face/neck/chest tightness and flushing side effects      Penicillins Hives     Unknown      Pork Allergy      Stomach pain, cramping, diarrhea, itching, nausea and headaches     Shellfish Allergy Hives and Swelling     Sulfa Drugs Hives and Swelling     Zithromax [Azithromycin Dihydrate] Swelling     Unknown        Family History   Problem Relation Age of Onset     Heart Disease Father 50        heart attack     Cerebrovascular Disease Father      Diabetes Father      Hypertension Father      Depression Father      C.A.D. Father      Hypertension Mother      Arthritis Mother      Heart Disease Mother         long qt syndrome     Thyroid Disease Mother      C.A.D. Mother      Heart Disease Brother 15        MI at 15, 16.      Diabetes Maternal Uncle      Hypertension Maternal Aunt      Hypertension Maternal Uncle      Arthritis Brother         he passed away, had severe arthritis at age 11     Arthritis Maternal Uncle      Eye Disorder Maternal Uncle         cataracts     Neurologic Disorder Sister         migraines     Neurologic Disorder Sister         migraines     Respiratory Son         asthma     Cerebrovascular Disease Maternal Uncle      C.A.D.  "Brother      Family History Negative Other         neg for RA, SLE     Unknown/Adopted No family hx of         unknown neurological issues in her family, mother was adopted     Skin Cancer No family hx of         No known family hx of skin cancer       Additional medical/Social/Surgical histories reviewed in [facility] and updated as appropriate.       PHYSICAL EXAM  Ht 1.6 m (5' 3\")   Wt 74.8 kg (165 lb)   BMI 29.23 kg/m      General  - normal appearance, in no obvious distress  Musculoskeletal - Right shoulder  - inspection: normal bone and joint alignment, no obvious deformity, no scapular winging, no AC step-off  - palpation: mildly tender RC insertion, normal clavicle, non-tender AC  - ROM: Limited in all planes of motion due to pain.  - strength: 5/5  strength, 5/5 in all shoulder planes  - special tests: limited due to inability to bring arm to 90 degrees  Skin - No injection site erythema, induration or visible swelling.  Neuro  - no sensory or motor deficit, grossly normal coordination, normal muscle tone    IMAGING : XR deferred today    Hemoglobin A1c 10.8 2/4/22    ASSESSMENT & PLAN  Ms. Cornell is a 56 year old year old female who presents to clinic today with acute right shoulder pain over the past 12 days.  Suspected possible localized inflammation persisting as a result of booster vaccine.  Less likely subacromial bursitis iatrogenic to injection.  No cutaneous findings to suggest infection at site. No systemic sx.    Diagnosis: Acute pain of right shoulder. Uncontrolled DM2    -Continue with icing of the shoulder  -Rest and provided sling today for temporary immobilization  -Tramadol as prescribed by PCP  -Passive gravity driven pendulum exercises  -Referral to formal PT to gradually increase AAROM and AROM  -Consideration for subacromial injection if persisting and better control of glucose is achieved.  Unfortunately a1c at 10.8 limits us. She has referral with PharmD to address.    It was a " pleasure seeing Janine today.    Nilesh Peralta DO, CAQSM  Primary Care Sports Medicine

## 2022-02-15 NOTE — PROGRESS NOTES
"  Assessment & Plan     Epigastric pain  Cont meds as is, pain down from \"ten plus\" over ten to 1/10. Await H pylori labs.   - Adult Gastro Ref - Procedure Only; Future  - CBC with platelets differential; Future  - Basic metabolic panel; Future    Serum calcium elevated  Elevated calcium last week, today wnl  - Ionized Calcium; Future    Vaginitis and vulvovaginitis  Emiric, if not better consider could it be BV, discussed  - terconazole (TERAZOL 3) 80 MG vaginal suppository; Place 1 suppository (80 mg) vaginally At Bedtime    Pain of left upper arm  Post covid shot. Will ask Sp Med to see today  - Orthopedic  Referral; Future    Leukocytosis, unspecified type  No clear source, no recent pred use  - Lab Blood Morphology Pathologist Review; Future  Consider bc although no systemic symptoms    History and labs last visit c/w dehydration; probably better po liquids since, she declines going to ER, will redo BMP today pending    DM poor control discussed could make yeast harder to manage and any infection, work w/ pharmD      65 minutes spent on the date of the encounter doing chart review, history and exam, documentation and further activities per the note    Tobacco Cessation:   reports that she has been smoking cigarettes. She has a 0.20 pack-year smoking history. She has never used smokeless tobacco.    BMI:   Estimated body mass index is 29.3 kg/m  as calculated from the following:    Height as of this encounter: 1.6 m (5' 3\").    Weight as of this encounter: 75 kg (165 lb 6.4 oz).     Kash Solano MD  [facility] [place]    Adrienne Mehta is a 56 year old who presents for the following health issues     HPI Here w/ son   1-abd pain better. On PPI and carafate both. Appetite low might be a bit better. Occasional nausea, no vomit five days. Normal stools. No fevers. She does not want to do colonoscopy at this time (see last notes/orders), would do EGD alone, will " "proceed w/ that, even with improving symtpoms might find source, also, she has H pylori test pending.  2-Thrush, same to a bit better, started oral nystatin a few days ago  3-rash: got topical lotrimin, starting  4-some vag drg/itch, in past was yeast, not better on Diflucan from outside MD    Discussed hi BG's increases risk of yeast anywhere    DM: elevated, discussed, has pharmD appt will ask for staff to help make appt    Not on steroids of late, WBC up, no clear source, will recheck, after last visit did recommend ER visit given that and possible dehydration on labs. Did not go. Probably better water intake at least on close questioning today. Recheck labs.    Due for Remicade, might put off to get second Covid shot    In 1-2 days after first Covid shot lots of pain left shoulder where got shot, now traveled down to elbow, not clearly swollen red warm but is tender, hurts and worse w/ movement shoulder or elbow    Weight stable    Calcium up last lab recheck      Review of Systems         Objective    BP 99/67   Pulse 86   Temp 97.8  F (36.6  C) (Oral)   Resp 16   Ht 1.6 m (5' 3\")   Wt 75 kg (165 lb 6.4 oz)   SpO2 99%   BMI 29.30 kg/m    Body mass index is 29.3 kg/m .  Physical Exam   GENERAL: healthy, alert and no distress  ABDOMEN: soft, nontender, no hepatosplenomegaly, no masses and bowel sounds normal  MS: No red, warm, swollen but very tender left shoulder/elbow, pain in both limits ROM to partial, forearm/wrist/hand normal ortho/n/v exam, no deformity                  "

## 2022-02-15 NOTE — PATIENT INSTRUCTIONS
1. Pendulum stretch    Do this exercise first. Relax your shoulders. Stand and lean over slightly, allowing the affected arm to hang down. Swing the arm in a small [place] -- about a foot in diameter. Perform 10 revolutions in each direction, once a day. As your symptoms improve, increase the diameter of your swing, but never force it. When you're ready for more, increase the stretch by holding a light weight (three to five pounds) in the swinging arm.    2. Towel stretch    Hold one end of a three-foot-long towel behind your back and grab the opposite end with your other hand. Hold the towel in a horizontal position. Use your good arm to pull the affected arm upward to stretch it. You can also do an advanced version of this exercise with the towel draped over your good shoulder. Hold the bottom of the towel with the affected arm and pull it toward the lower back with the unaffected arm. Do this 10 to 20 times a day.        3. Finger walk    Face a wall three-quarters of an arm's length away. Reach out and touch the wall at waist level with the fingertips of the affected arm. With your elbow slightly bent, slowly walk your fingers up the wall, spider-like, until you've raised your arm as far as you comfortably can. Your fingers should be doing the work, not your shoulder muscles. Slowly lower the arm (with the help of the good arm, if necessary) and repeat. Perform this exercise 10 to 20 times a day.        4. Cross-body reach    Sit or stand. Use your good arm to lift your affected arm at the elbow, and bring it up and across your body, exerting gentle pressure to stretch the shoulder. Hold the stretch for 15 to 20 seconds. Do this 10 to 20 times per day.        5. Armpit stretch    Using your good arm, lift the affected arm onto a shelf about breast-high. Gently bend your knees, opening up the armpit. Deepen your knee bend slightly, gently stretching the armpit, and then straighten. With each knee bend,  stretch a little further, but don't force it. Do this 10 to 20 times each day.    Starting to strengthen    As your range of motion improves, add rotator cuff-strengthening exercises. Be sure to warm up your shoulder and do your stretching exercises before you perform strengthening exercises.        6. Outward rotation    Hold a rubber exercise band between your hands with your elbows at a 90-degree angle close to your sides. Rotate the lower part of the affected arm outward two or three inches and hold for five seconds. Repeat 10 to 15 times, once a day.      7. Inward rotation    Stand next to a closed door, and hook one end of a rubber exercise band around the doorknob. Hold the other end with the hand of the affected arm, holding your elbow at a 90-degree angle. Pull the band toward your body two or three inches and hold for five seconds. Repeat 10 to 15 times, once a day.        For more exercises to improve your balance and prevent falls, increase your flexibility, and even help relieve arthritis, back, and knee pain, buy the [facility] Report Stretchin exercises to improve flexibility and reduce pain.    Reference: [email or web address]-[email or web address]

## 2022-02-15 NOTE — PROGRESS NOTES
CHIEF COMPLAINT:  Pain of the Right Arm       HISTORY OF PRESENT ILLNESS  Ms. Cornell is a pleasant 56 year old year old female who presents to clinic today with right arm pain.  Janine explains that she received the pfizer vaccine on 2/4/22 and has noticed shoulder and elbow pain    Onset: gradual  Location: left shoulder and left elbow  Quality:  constant  Duration: 11 days   Severity: 7/10 at worst  Timing:constant  Modifying factors:  resting and non-use makes it better, movement and use makes it worse  Associated signs & symptoms: pain  Previous similar pain: No  Treatments to date: heat, ice, tramadol     Additional history: as documented    Review of Systems:    Have you recently had a a fever, chills, weight loss? No    Do you have any vision problems? No    Do you have any chest pain or edema? No    Do you have any shortness of breath or wheezing?  Yes, asthma     Do you have stomach problems? No    Do you have any numbness or focal weakness? Yes, arm    Do you have diabetes? Yes, type 2    Do you have problems with bleeding or clotting? No    Do you have an rashes or other skin lesions? No    MEDICAL HISTORY  Patient Active Problem List   Diagnosis     Seasonal affective disorder (H)     Allergic rhinitis     PCOS (polycystic ovarian syndrome)     Moderate persistent asthma     Chronic pancreatitis (H)     Hypertension goal BP (blood pressure) < 140/90     Common migraine     NSTEMI (non-ST elevated myocardial infarction) (H)     Hyperlipidemia LDL goal <70     ASCVD (arteriosclerotic cardiovascular disease)     GERD (gastroesophageal reflux disease)     Ischemic cardiomyopathy     Hypertensive heart disease     Uterine leiomyoma     Iron deficiency anemia     Costochondritis     Vitamin D deficiency     Breast cancer screening     Spinal stenosis in cervical region     Fibromyalgia     Seronegative rheumatoid arthritis (H)     Type 2 diabetes, HbA1C goal < 8% (H)     Type 2 diabetes mellitus with other  specified complication (H)     Hyperlipidemia associated with type 2 diabetes mellitus (H)     Hypertension associated with diabetes (H)     Overweight with body mass index (BMI) 25.0-29.9     Tobacco use disorder     Telogen effluvium     CAD S/P percutaneous coronary angioplasty     Status post coronary angiogram     ANCA-associated vasculitis (H)     Wegener's vasculitis     Type 1 diabetes mellitus with complications (H)     Chest discomfort     Urinary frequency     Abdominal pain, epigastric     Hypokalemia     Leukocytosis, unspecified type     Acute pancreatitis, unspecified complication status, unspecified pancreatitis type       Current Outpatient Medications   Medication Sig Dispense Refill     acetaminophen (TYLENOL) 325 MG tablet Take 1-2 tablets (325-650 mg) by mouth every 6 hours as needed for pain (headache) 250 tablet 4     albuterol (2.5 MG/3ML) 0.083% neb solution INL 1 VIAL VIA NEBULIZATION Q 4 TO 6 HOURS PRN  1     albuterol (PROAIR HFA, PROVENTIL HFA, VENTOLIN HFA) 108 (90 BASE) MCG/ACT inhaler Inhale 2 puffs into the lungs every 6 hours as needed for shortness of breath / dyspnea or wheezing 3 Inhaler 1     aspirin (ASPIRIN LOW DOSE) 81 MG EC tablet Take 1 tablet (81 mg) by mouth daily . 30-day refills only. 30 tablet 11     blood glucose monitoring (NO BRAND SPECIFIED) meter device kit Use to test blood sugar 4 X times daily or as directed. (Patient taking differently: 1 kit Use to test blood sugar 4 X times daily or as directed.) 1 kit 0     blood glucose monitoring (NO BRAND SPECIFIED) test strip 1 strip by In Vitro route 4 times daily Test as directed. Please dispense three months and three months of refills. Thank you. (Patient taking differently: 1 strip by In Vitro route 4 times daily Test as directed. Please dispense three months and three months of refills. Thank you.) 360 each 3     Blood Pressure Monitor KIT 1 each daily Monitor home blood pressure as instructed by  physician.  Dispense [facility] blood pressure kit. 1 kit 0     calcium carbonate (TUMS) 500 MG chewable tablet Take 3-4 tablets (1,500-2,000 mg) by mouth daily as needed 90 tablet 3     clopidogrel (PLAVIX) 75 MG tablet Take 1 tablet (75 mg) by mouth daily . 30-day refills only. 30 tablet 11     COMPRESSION STOCKINGS 2 each daily Apply one 10-15 mmHg compression stocking to each lower extgmierty during the day and remove before bedtime. 4 each 2     cyclobenzaprine (FLEXERIL) 10 MG tablet Take 1 tablet (10 mg) by mouth 2 times daily as needed for muscle spasms 60 tablet 3     dicyclomine (BENTYL) 20 MG tablet TAKE 1 TABLET(20 MG) BY MOUTH FOUR TIMES DAILY AS NEEDED. Pls schedule clinic appt for refills. 60 tablet 0     diphenhydrAMINE (BENADRYL) 25 MG capsule Take 1 capsule (25 mg) by mouth nightly as needed for itching or allergies 30 capsule 2     EPIPEN 2-RIKY 0.3 MG/0.3ML injection INJECT 0.3 MG INTO THE MUSCLE PRF ANAPHYALAXIS  0     Ergocalciferol (VITAMIN D2) 50 MCG (2000 UT) TABS Take 2,000 Units by mouth daily 90 tablet 1     esomeprazole (NEXIUM) 40 MG DR capsule Take 1 capsule (40 mg) by mouth every morning (before breakfast) Take 30-60 minutes before eating. 30 capsule 3     famotidine (PEPCID) 20 MG tablet Take 1 tablet (20 mg) by mouth 2 times daily as needed (abdominal pain) 20 tablet 0     fexofenadine (ALLEGRA) 180 MG tablet Take 1 tablet by mouth daily as needed. 90 tablet 3     fluocinolone (SYNALAR) 0.01 % solution Apply topically daily as needed        fluocinonide (LIDEX) 0.05 % external ointment Apply topically as needed        fluticasone-vilanterol (BREO ELLIPTA) 200-25 MCG/INH inhaler Inhale 1 puff into the lungs daily        folic acid (FOLVITE) 1 MG tablet Take 1 tablet by mouth daily 90 tablet 3     insulin glargine (LANTUS PEN) 100 UNIT/ML pen Inject 58 Units Subcutaneous every morning       insulin pen needle (BD MARCK U/F) 32G X 4 MM USE DAILY OR AS DIRECTED 300 each 3     ketoconazole  (NIZORAL) 2 % shampoo Apply topically daily as needed        lisinopril-hydrochlorothiazide (ZESTORETIC) 20-25 MG tablet Take 1 tablet by mouth daily . 30-day refills only. 30 tablet 11     magnesium 250 MG tablet TAKE 1 TABLET(250 MG) BY MOUTH TWICE DAILY 60 tablet 1     metFORMIN (GLUCOPHAGE-XR) 500 MG 24 hr tablet Take 2,000 mg by mouth daily (with dinner)       metoprolol succinate ER (TOPROL-XL) 100 MG 24 hr tablet Take 1 tablet (100 mg) by mouth daily . 30-day refills only. 30 tablet 11     montelukast (SINGULAIR) 10 MG tablet Take 1 tablet (10 mg) by mouth At Bedtime 30 tablet 1     Multiple Vitamin (DAILY-GERALD) TABS Take 1 tablet by mouth daily 90 tablet 0     nitroGLYcerin (NITROSTAT) 0.4 MG sublingual tablet Place 1 tablet (0.4 mg) under the tongue every 5 minutes as needed for chest pain . After 3 doses, if pain persists call 911. 25 tablet 3     nystatin (MYCOSTATIN) 067363 UNIT/ML suspension Take 5 mLs (500,000 Units) by mouth 4 times daily 200 mL 0     ondansetron (ZOFRAN-ODT) 4 MG ODT tab Take 1 tablet (4 mg) by mouth every 8 hours as needed for nausea 12 tablet 0     ondansetron (ZOFRAN-ODT) 8 MG ODT tab Take 1 tablet (8 mg) by mouth every 8 hours as needed for nausea 20 tablet 1     polyethylene glycol (MIRALAX) 17 GM/Dose powder Take 17 g (1 capful) by mouth daily 507 g 1     pravastatin (PRAVACHOL) 40 MG tablet Take 1 tablet (40 mg) by mouth daily . 30-day refills only. 30 tablet 11     sennosides (SENOKOT) 8.6 MG tablet 1-2 tabs a day as needed for constipation 60 tablet 1     spironolactone (ALDACTONE) 25 MG tablet Take 1 tablet (25 mg) by mouth daily . Take one additional 0.5 tab daily as needed for weight gain. 45 day refills. 45 tablet 11     sucralfate (CARAFATE) 1 GM/10ML suspension Take 10 mLs (1 g) by mouth 4 times daily 1200 mL 1     SYMBICORT 80-4.5 MCG/ACT Inhaler        terbinafine (LAMISIL) 1 % external cream Apply topically 2 times daily 42 g 1     terconazole (TERAZOL 3) 80 MG  vaginal suppository Place 1 suppository (80 mg) vaginally At Bedtime 3 suppository 1     traMADol (ULTRAM) 50 MG tablet TAKE 1 TABLET(50 MG) BY MOUTH EVERY 8 HOURS AS NEEDED FOR MODERATE PAIN 60 tablet 3     vitamin D2 (ERGOCALCIFEROL) 59045 units (1250 mcg) capsule Take 1 capsule (50,000 Units) by mouth once a week 12 capsule 0     vitamin D3 (CHOLECALCIFEROL) 50 mcg (2000 units) tablet Take 1 tablet (50 mcg) by mouth daily 90 tablet 1       Allergies   Allergen Reactions     Amoxicillin-Pot Clavulanate      Augmentin      Unknown possible hives and edema     Azithromycin      Diatrizoate Other (See Comments)     Pt wants this listed because she is allergic to shellfish      Imitrex [Sumatriptan]      Severe face/neck/chest tightness and flushing side effects      Penicillins Hives     Unknown      Pork Allergy      Stomach pain, cramping, diarrhea, itching, nausea and headaches     Shellfish Allergy Hives and Swelling     Sulfa Drugs Hives and Swelling     Zithromax [Azithromycin Dihydrate] Swelling     Unknown        Family History   Problem Relation Age of Onset     Heart Disease Father 50        heart attack     Cerebrovascular Disease Father      Diabetes Father      Hypertension Father      Depression Father      C.A.D. Father      Hypertension Mother      Arthritis Mother      Heart Disease Mother         long qt syndrome     Thyroid Disease Mother      C.A.D. Mother      Heart Disease Brother 15        MI at 15, 16.      Diabetes Maternal Uncle      Hypertension Maternal Aunt      Hypertension Maternal Uncle      Arthritis Brother         he passed away, had severe arthritis at age 11     Arthritis Maternal Uncle      Eye Disorder Maternal Uncle         cataracts     Neurologic Disorder Sister         migraines     Neurologic Disorder Sister         migraines     Respiratory Son         asthma     Cerebrovascular Disease Maternal Uncle      C.A.D. Brother      Family History Negative Other          "neg for RA, SLE     Unknown/Adopted No family hx of         unknown neurological issues in her family, mother was adopted     Skin Cancer No family hx of         No known family hx of skin cancer       Additional medical/Social/Surgical histories reviewed in [facility] and updated as appropriate.       PHYSICAL EXAM  Ht 1.6 m (5' 3\")   Wt 74.8 kg (165 lb)   BMI 29.23 kg/m      General  - normal appearance, in no obvious distress  Musculoskeletal - Right shoulder  - inspection: normal bone and joint alignment, no obvious deformity, no scapular winging, no AC step-off  - palpation: mildly tender RC insertion, normal clavicle, non-tender AC  - ROM: Limited in all planes of motion due to pain.  - strength: 5/5  strength, 5/5 in all shoulder planes  - special tests: limited due to inability to bring arm to 90 degrees  Skin - No injection site erythema, induration or visible swelling.  Neuro  - no sensory or motor deficit, grossly normal coordination, normal muscle tone    IMAGING : XR deferred today    Hemoglobin A1c 10.8 2/4/22    ASSESSMENT & PLAN  Ms. Cornell is a 56 year old year old female who presents to clinic today with acute right shoulder pain over the past 12 days.  Suspected possible localized inflammation persisting as a result of booster vaccine.  Less likely subacromial bursitis iatrogenic to injection.  No cutaneous findings to suggest infection at site. No systemic sx.    Diagnosis: Acute pain of right shoulder. Uncontrolled DM2    -Continue with icing of the shoulder  -Rest and provided sling today for temporary immobilization  -Tramadol as prescribed by PCP  -Passive gravity driven pendulum exercises  -Referral to formal PT to gradually increase AAROM and AROM  -Consideration for subacromial injection if persisting and better control of glucose is achieved.  Unfortunately a1c at 10.8 limits us. She has referral with PharmD to address.    It was a pleasure seeing Janine today.    Nilesh Peralta, DO, " CAQSM  Primary Care Sports Medicine

## 2022-02-16 LAB — H PYLORI AG STL QL IA: NEGATIVE

## 2022-02-17 PROBLEM — M31.30 GRANULOMATOSIS WITH POLYANGIITIS (H): Status: ACTIVE | Noted: 2018-11-29

## 2022-02-21 DIAGNOSIS — R31.9 HEMATURIA, UNSPECIFIED TYPE: ICD-10-CM

## 2022-02-21 DIAGNOSIS — I77.82 ANCA-ASSOCIATED VASCULITIS (H): Primary | ICD-10-CM

## 2022-02-25 ENCOUNTER — ALLIED HEALTH/NURSE VISIT (OUTPATIENT)
Dept: INTERNAL MEDICINE | Facility: CLINIC | Age: 57
End: 2022-02-25
Payer: COMMERCIAL

## 2022-02-25 DIAGNOSIS — Z23 NEED FOR VACCINATION: Primary | ICD-10-CM

## 2022-02-25 PROCEDURE — 91305 COVID-19,PF,PFIZER (12+ YRS): CPT

## 2022-02-25 PROCEDURE — 0052A COVID-19,PF,PFIZER (12+ YRS): CPT

## 2022-02-25 NOTE — NURSING NOTE
"Janine Cornell was brought back into clinic to receive her second dose of the Pfizer Covid-19 Vaccine. In the rooming process, patient expressed a pain in her arm that she suspected was related to her first shot. Patient described a tension that started in the crux of the arm that extended on the medial side of the bicep. Patient reports reduced function of left arm. Writer asked if patient if patient wanted to continue with getting the second shot today on three separate occasions. Patient responded, \"that's why I am here.\" After reviewing pre-screening questions, patient gave no indication that she would be disqualified for her shot. Writer continued with administration. Patient then received the Pfizer COVID-19 Vaccine today in clinic at the request of Dr. Solano. The immunization was given under the supervision of Dr. Solano if assistance was needed. The immunization site was cleaned with an alcohol prep wipe. The immunization was given without incident--see immunization list for administration details. No swelling or redness was observed at the site of injection after the immunization was given. The patient was advised to remain in [facility] [place] for 15 minutes after the injection in case of an averse reaction.     Eleazar Sneed, EMT at 3:26 PM on 2/25/2022  "

## 2022-02-28 ENCOUNTER — TELEPHONE (OUTPATIENT)
Dept: FAMILY MEDICINE | Facility: CLINIC | Age: 57
End: 2022-02-28
Payer: COMMERCIAL

## 2022-02-28 NOTE — TELEPHONE ENCOUNTER
100-102 since Friday night  Arm pain still hurting but getting better  Yeast like symptoms resolved, some discharge still happening more watery now  Urine has an odd smell, denies burning, urgency. Color is pale yellow to yellow     Patient would like to do telephone appointment, as she is not feeling well, to discuss things further instead of coming in if she does not have to. Laney Haddad LPN 2/28/2022 12:54 PM

## 2022-02-28 NOTE — TELEPHONE ENCOUNTER
If possible see what current symptoms are  Her white count was up and I did not have a good explanation for that, and was unable to add on the peripheral smear  Stool test for stomach infection was normal  Sodium borderline low could be from BP med side effect  I'd like at least to double check those things  Ideally I'd see her back as soon as possible too but if she'd prefer no appt now, let's at least call her back Friday and see how she is doing

## 2022-02-28 NOTE — TELEPHONE ENCOUNTER
She does not want anyone else to call her to set up any more appointments. She is not feeling well at the moment and will call us when she is feeling better. She would like a nurse or someone from PCP's team to call her to go over recent lab results. Call back number is

## 2022-03-01 ENCOUNTER — VIRTUAL VISIT (OUTPATIENT)
Dept: PHARMACY | Facility: CLINIC | Age: 57
End: 2022-03-01
Attending: FAMILY MEDICINE
Payer: COMMERCIAL

## 2022-03-01 DIAGNOSIS — E11.9 TYPE 2 DIABETES, HBA1C GOAL < 7% (H): ICD-10-CM

## 2022-03-01 PROCEDURE — 99605 MTMS BY PHARM NP 15 MIN: CPT | Performed by: PHARMACIST

## 2022-03-01 PROCEDURE — 99607 MTMS BY PHARM ADDL 15 MIN: CPT | Performed by: PHARMACIST

## 2022-03-01 RX ORDER — PEN NEEDLE, DIABETIC 32GX 5/32"
NEEDLE, DISPOSABLE MISCELLANEOUS
Qty: 300 EACH | Refills: 3 | Status: SHIPPED | OUTPATIENT
Start: 2022-03-01 | End: 2023-03-28

## 2022-03-01 NOTE — PATIENT INSTRUCTIONS
Recommendations from today's MTM visit:                                                    MTM (medication therapy management) is a service provided by a clinical pharmacist designed to help you get the most of out of your medicines.   Today we reviewed what your medicines are for, how to know if they are working, that your medicines are safe and how to make your medicine regimen as easy as possible.      1.Increase Lantus to 68 units daily.     Follow-up: No follow-ups on file.    It was great to speak with you today.  I value your experience and would be very thankful for your time with providing feedback on our clinic survey. You may receive a survey via email or text message in the next few days.     To schedule another MTM appointment, please call the clinic directly or you may call the MTM scheduling line at [phone] or toll-free at [phone].     My Clinical Pharmacist's contact information:                                                      Please feel free to contact me with any questions or concerns you have.      Nilesh Blue, Pharm. D., [facility]  Medication Therapy Management Pharmacist  Direct Voicemail: [phone]

## 2022-03-01 NOTE — PROGRESS NOTES
"Medication Therapy Management (MTM) Encounter    ASSESSMENT:                            Medication Adherence/Access: unable to assess    Type 2 Diabetes: Patient is not meeting A1c goal of < 7%. Self monitoring of blood glucose is not at goal of fasting  mg/dL. She was resistant to discussing changes with me. She should follow her practice of not taking insulin if she isnt eating. Will increase Insulin by 10% today.      PLAN:                            1.Increase Lantus to 68 units daily.     Follow-up: Return in about 30 days (around 3/31/2022).    SUBJECTIVE/OBJECTIVE:                          Janine Cornell is a 56 year old female called for an initial visit. She was referred to me from Kash Solano MD.      Reason for visit: diabetes management.    Allergies/ADRs: Reviewed in chart  Past Medical History: Reviewed in chart  Tobacco: She reports that she has been smoking cigarettes. She has a 0.20 pack-year smoking history. She has never used smokeless tobacco.Tobacco Cessation Action Plan:   not discussed at this visit    Alcohol: not discussed      Medication Adherence/Access:  patient declines to talk about medications not related to her diabetes. Patient is resistant to discuss medications with me.     Type 2 Diabetes:  Currently taking Lantus 62 units every morning, and metformin ER 2000 mg daily. Patient refuses to answer questions about side effects  Blood sugar monitorin-3 time(s) daily. Ranges (patient reported):   It varies anywhere from in the 100s to the 200s. Mostly takes her blood sugars in the morning. Blood sugar was 233 mg/dL this morning. She has been running a fever since Friday since she got the last vaccine shot. She has been in tremendous pain. If im in pain I dont eat and if I dont eat I dont take my insulin. She reports that on occasions she has bottomed out. Over the last weekend she didn't eat and her blood sugars were \"133, 167, 118, 97.\" Continuous glucose monitors have " "been explained to her before and is not interested in using this. She is not interested in meal time insulin right now because it would be a waste of \"time, money, and efforts.\" She feels that she would not be able to manage using mealtime insulin. Previously tried osmotic metformin and had \"severe\" stomach pain.    Depends on if shes sick if there are other things physically wrong. She states that she gets steroids when she gets infusions and she is frustrated that doctors \"pass her along\" and she is taken aback that she has been referred to me. \"There has been tremendous stress...and stress tends to drive my blood sugar up.\"  Symptoms of low blood sugar? shaky, Frequency of lows- quite a few times over the last year.     Diet/Exercise: My diet has changed over the past 2 years. I don't need help with how to eat. Some vegetables make her blood sugar go high.   Aspirin: patient declined to discuss  Statin: patient declined to discuss  ACEi/ARB: patient declined to discuss  Urine Albumin:   Lab Results   Component Value Date    UMALCR 6.87 11/15/2017      Lab Results   Component Value Date    A1C 10.8 02/04/2022    A1C 8.0 06/18/2020    A1C 9.2 03/06/2019    A1C 9.6 11/09/2018    A1C 8.4 11/15/2017    A1C 8.8 06/28/2017     Today's Vitals: There were no vitals taken for this visit.  ----------------      I spent 54 minutes with this patient today. All changes were made via collaborative practice agreement with Kash Solano MD. A copy of the visit note was provided to the patient's provider(s).    The patient declined a summary of these recommendations.     Nilesh Blue, Pharm. D., [facility]  Medication Therapy Management Pharmacist  Direct Voicemail: [phone]'    Telemedicine Visit Details  Type of service:  Telephone visit  Start Time: 11:02 am  End Time: 11:56 am  Originating Location (patient location): Home  Distant Location (provider location):  [facility]     Medication Therapy " Recommendations  Type 2 diabetes, HbA1c goal < 7% (H)    Current Medication: insulin glargine (LANTUS PEN) 100 UNIT/ML pen   Rationale: Dose too low - Dosage too low - Effectiveness   Recommendation: Increase Dose   Status: Accepted per CPA

## 2022-03-01 NOTE — Clinical Note
Hello,    I had an hour long conversation with this patient today about her diabetes. Increased her insulin. Refused to discuss any other medications with me and also refused short acting insulin. Will plan to continue to increase Lantus although she is likely overbasalized.    Nilesh

## 2022-03-02 DIAGNOSIS — E55.9 VITAMIN D DEFICIENCY: ICD-10-CM

## 2022-03-03 ENCOUNTER — VIRTUAL VISIT (OUTPATIENT)
Dept: FAMILY MEDICINE | Facility: CLINIC | Age: 57
End: 2022-03-03
Payer: COMMERCIAL

## 2022-03-03 DIAGNOSIS — R14.0 ABDOMINAL BLOATING: Primary | ICD-10-CM

## 2022-03-03 DIAGNOSIS — N76.0 VAGINITIS AND VULVOVAGINITIS: ICD-10-CM

## 2022-03-03 DIAGNOSIS — D72.829 LEUKOCYTOSIS, UNSPECIFIED TYPE: ICD-10-CM

## 2022-03-03 DIAGNOSIS — E11.9 TYPE 2 DIABETES, HBA1C GOAL < 8% (H): ICD-10-CM

## 2022-03-03 DIAGNOSIS — R19.5 LOOSE STOOLS: ICD-10-CM

## 2022-03-03 DIAGNOSIS — R11.0 NAUSEA: ICD-10-CM

## 2022-03-03 PROCEDURE — 99443 PR PHYSICIAN TELEPHONE EVALUATION 21-30 MIN: CPT | Mod: 95 | Performed by: FAMILY MEDICINE

## 2022-03-03 RX ORDER — ONDANSETRON 8 MG/1
8 TABLET, ORALLY DISINTEGRATING ORAL EVERY 8 HOURS PRN
Qty: 20 TABLET | Refills: 1 | Status: SHIPPED | OUTPATIENT
Start: 2022-03-03 | End: 2022-08-19

## 2022-03-03 RX ORDER — METRONIDAZOLE 500 MG/1
500 TABLET ORAL 2 TIMES DAILY
Qty: 14 TABLET | Refills: 0 | Status: SHIPPED | OUTPATIENT
Start: 2022-03-03 | End: 2022-03-10

## 2022-03-03 RX ORDER — METFORMIN HCL 500 MG
2000 TABLET, EXTENDED RELEASE 24 HR ORAL
Qty: 120 TABLET | Refills: 11 | Status: SHIPPED | OUTPATIENT
Start: 2022-03-03 | End: 2023-03-28

## 2022-03-03 NOTE — PROGRESS NOTES
"                Janine is a 56 year old who is being evaluated via a billable telephone visit.      What phone number would you like to be contacted at? In [facility]  How would you like to obtain your AVS? MyChart    Assessment & Plan     Abdominal bloating  Do SIBO test  - Adult Gastro Ref - Procedure Only; Future    Loose stools  Same. Offered stool tests c diff, o/p, stool culture, she wants to wait. Same w/ ffered colonoscopy  - Adult Gastro Ref - Procedure Only; Future    Leukocytosis, unspecified type  No source ID yet  - CBC with platelets differential; Future  - Comprehensive metabolic panel; Future  - Blood Culture Peripheral Blood; Future  - Lyme Disease Marline with reflex to WB; Future  - Lab Blood Morphology Pathologist Review; Future    Type 2 diabetes, HbA1C goal < 8% (H)  Needs refill, working w/ pharmD now  - metFORMIN (GLUCOPHAGE-XR) 500 MG 24 hr tablet; Take 4 tablets (2,000 mg) by mouth daily (with dinner)    Vaginitis and vulvovaginitis  Offered gyn she'd rather empiric try flagyl  - metroNIDAZOLE (FLAGYL) 500 MG tablet; Take 1 tablet (500 mg) by mouth 2 times daily for 7 days    Nausea  Hell[facility] declines EGD for now  - ondansetron (ZOFRAN-ODT) 8 MG ODT tab; Take 1 tablet (8 mg) by mouth every 8 hours as needed for nausea    Arm pain she'll try the PIT    55 minutes spent on the date of the encounter doing chart review, history and exam, documentation and further activities per the note    BMI:   Estimated body mass index is 29.23 kg/m  as calculated from the following:    Height as of 2/15/22: 1.6 m (5' 3\").    Weight as of 2/15/22: 74.8 kg (165 lb).     Kash Solano MD  [facility] [place]    Subjective   Janine is a 56 year old who presents for the following health issues     HPI 1-GI: pain gone. However, has early satiety, immediate bloat if eats. Gradually looser stools not hematochezia or melena, eg three loose stools today already we talked at one pm. Appetite intact " but as above hard to eat. Cannot do home wt's, no wt loss on our scales.   2-right arm pain started after the covid shot, see Sp Med note, still very painful, starts PT soon. Per pt offered steroid burst by ortho but does not want to take due to DM  3-DM: very variable numbers that don't seem to track w/ calorie intake per pt  Working w/ pharmD now.  4-She does not want to to EGD now, or colonoscoy (ideally both, EGD for upper GI sx, colonoscopy for change bowel habit w/ autoimmune history perhaps IBD). Neg G empty in past a few times for similar sx she recalls. Never did SIBO tests, discussed SIBO.  5-vaginitis sx still there but changed to watery drg only post yeast med. Never had BV.  6-hi WBC no cause known. No fevers now. Further w/u needed. Will add p smear, blood culture. Lyme. W/ looser stools I suggest stool tests she wants to not do at this point.    Does get some nausea zofran helps    Past Medical History:   Diagnosis Date     Abnormal glandular Papanicolaou smear of cervix 1992     Allergic rhinitis     Allergy, airborne subst     Arthritis      ASCVD (arteriosclerotic cardiovascular disease)      Chronic pain      Chronic pancreatitis (H)     idiopathic, Tx: PPI, antioxidants, pancreatic enzymes     Common migraine      Coronary artery disease      Costochondritis      Difficulty in walking(719.7)      Dyspnea on exertion      Ectasia, mammary duct     [facility], persistent nipple discharge     Elevated fasting glucose      Gastro-oesophageal reflux disease      Granulomatosis with polyangiitis (H)      Hernia      History of angina      Hyperlipidemia LDL goal < 100      Hypertension goal BP (blood pressure) < 140/90     Essential hypertension     Iron deficiency anemia      Ischemic cardiomyopathy      Menorrhagia      Migraine headaches      Mild persistent asthma      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 Matias, neg w/u, Rx'd w/prednisone     Ventral hernia, unspecified, without mention of obstruction or gangrene 2012     Past Surgical History:   Procedure Laterality Date     C/SECTION, LOW TRANSVERSE           CARDIAC SURGERY      cardiac stent- recent in 16; 4 other stents     DILATION AND CURETTAGE N/A 2016    Procedure: DILATION AND CURETTAGE;  Surgeon: Nahed Butler MD;  Location: [facility] OR     HC UGI ENDOSCOPY W EUS  08    Dr. Pastrana, possible chronic pancreatitis, fatty liver     HERNIA REPAIR  2012    done at [facility]     INSERT INTRAUTERINE DEVICE N/A 2016    Procedure: INSERT INTRAUTERINE DEVICE;  Surgeon: Nahed Butler MD;  Location: [facility] OR     INT UTERINE FIBRIOD EMBOLIZATION  10/29/2014     LAPAROSCOPIC CHOLECYSTECTOMY  08    Dr. Arnol GRUBBS TX, CERVICAL      s/p LEEP     ORBITOTOMY Right 3/15/2016    Procedure: ORBITOTOMY;  Surgeon: Myron Cyr MD;  Location: [facility]     ORBITOTOMY Right 2017    Procedure: ORBITOTOMY;  RIGHT ORBITOTOMY AND BIOPSY;  Surgeon: Charis Holbrook MD;  Location: [facility]     REPAIR PTOSIS Right 2017    Procedure: REPAIR PTOSIS;  RIGHT UPPER LID PTOSIS REPAIR;  Surgeon: Myron Cyr MD;  Location: [facility]     UPPER GI ENDOSCOPY  10/21/08    mild gastritis, Dr. Rocky [facility] XTH[facility]ACIC,COMPLETE, W/O DOPPLER  04     [facility] WNL, EF 60%     Current Outpatient Medications   Medication     acetaminophen (TYLENOL) 325 MG tablet     albuterol (2.5 MG/3ML) 0.083% neb solution     albuterol (PROAIR HFA, PROVENTIL HFA, VENTOLIN HFA) 108 (90 BASE) MCG/ACT inhaler     aspirin (ASPIRIN LOW DOSE) 81 MG EC tablet     blood glucose (NO BRAND SPECIFIED) lancets standard     blood glucose (NO BRAND SPECIFIED) test strip     blood glucose monitoring (NO BRAND SPECIFIED) meter device kit     blood glucose monitoring (NO BRAND SPECIFIED) test strip     Blood Pressure Monitor KIT     calcium carbonate (TUMS) 500 MG chewable tablet     clopidogrel (PLAVIX) 75 MG tablet     COMPRESSION STOCKINGS     cyclobenzaprine (FLEXERIL) 10 MG tablet     dicyclomine (BENTYL) 20 MG tablet     diphenhydrAMINE (BENADRYL) 25 MG capsule     EPIPEN 2-RIKY 0.3 MG/0.3ML injection     Ergocalciferol (VITAMIN D2) 50 MCG (2000 UT) TABS     esomeprazole (NEXIUM) 40 MG DR capsule     famotidine (PEPCID) 20 MG tablet     fexofenadine (ALLEGRA) 180 MG tablet     fluocinolone (SYNALAR) 0.01 % solution     fluocinonide (LIDEX) 0.05 % external ointment     fluticasone-vilanterol (BREO ELLIPTA) 200-25 MCG/INH inhaler     folic acid (FOLVITE) 1 MG tablet     insulin glargine (LANTUS PEN) 100 UNIT/ML pen     insulin pen needle (BD MARCK U/F) 32G X 4 MM miscellaneous     ketoconazole (NIZORAL) 2 % shampoo     lisinopril-hydrochlorothiazide (ZESTORETIC) 20-25 MG tablet     magnesium 250 MG tablet     metFORMIN (GLUCOPHAGE-XR) 500 MG 24 hr tablet     metoprolol succinate ER (TOPROL-XL) 100 MG 24 hr tablet     metroNIDAZOLE (FLAGYL) 500 MG tablet     montelukast (SINGULAIR) 10 MG tablet     Multiple Vitamin (DAILY-GERALD) TABS     nitroGLYcerin (NITROSTAT) 0.4 MG sublingual tablet     nystatin (MYCOSTATIN) 004689 UNIT/ML suspension     ondansetron (ZOFRAN-ODT) 4 MG ODT tab     ondansetron (ZOFRAN-ODT) 8 MG ODT tab     polyethylene glycol (MIRALAX) 17 GM/Dose powder      pravastatin (PRAVACHOL) 40 MG tablet     sennosides (SENOKOT) 8.6 MG tablet     spironolactone (ALDACTONE) 25 MG tablet     sucralfate (CARAFATE) 1 GM/10ML suspension     SYMBICORT 80-4.5 MCG/ACT Inhaler     terbinafine (LAMISIL) 1 % external cream     terconazole (TERAZOL 3) 80 MG vaginal suppository     traMADol (ULTRAM) 50 MG tablet     vitamin D2 (ERGOCALCIFEROL) 36057 units (1250 mcg) capsule     vitamin D3 (CHOLECALCIFEROL) 50 mcg (2000 units) tablet     No current facility-administered medications for this visit.     Allergies   Allergen Reactions     Amoxicillin-Pot Clavulanate      Augmentin      Unknown possible hives and edema     Azithromycin      Diatrizoate Other (See Comments)     Pt wants this listed because she is allergic to shellfish      Imitrex [Sumatriptan]      Severe face/neck/chest tightness and flushing side effects      Penicillins Hives     Unknown      Pork Allergy      Stomach pain, cramping, diarrhea, itching, nausea and headaches     Shellfish Allergy Hives and Swelling     Sulfa Drugs Hives and Swelling     Zithromax [Azithromycin Dihydrate] Swelling     Unknown        Review of Systems         Objective           Vitals:  No vitals were obtained today due to virtual visit.    Physical Exam   healthy, alert and no distress  PSYCH: Alert and oriented times 3; coherent speech, normal   rate and volume, able to articulate logical thoughts, able   to abstract reason, no tangential thoughts, no hallucinations   or delusions  Her affect is normal  RESP: No cough, no audible wheezing, able to talk in full sentences  Remainder of exam unable to be completed due to telephone visits            Phone call duration: 47 minutes

## 2022-03-04 ENCOUNTER — HOSPITAL ENCOUNTER (EMERGENCY)
Facility: CLINIC | Age: 57
Discharge: HOME | End: 2022-03-04
Attending: EMERGENCY MEDICINE | Admitting: EMERGENCY MEDICINE
Payer: COMMERCIAL

## 2022-03-04 ENCOUNTER — APPOINTMENT (OUTPATIENT)
Dept: ULTRASOUND IMAGING | Facility: CLINIC | Age: 57
End: 2022-03-04
Attending: EMERGENCY MEDICINE
Payer: COMMERCIAL

## 2022-03-04 ENCOUNTER — NURSE TRIAGE (OUTPATIENT)
Dept: NURSING | Facility: CLINIC | Age: 57
End: 2022-03-04
Payer: COMMERCIAL

## 2022-03-04 VITALS
DIASTOLIC BLOOD PRESSURE: 81 MMHG | HEART RATE: 100 BPM | RESPIRATION RATE: 18 BRPM | OXYGEN SATURATION: 97 % | TEMPERATURE: 96.7 F | SYSTOLIC BLOOD PRESSURE: 140 MMHG

## 2022-03-04 DIAGNOSIS — M79.602 BILATERAL ARM PAIN: ICD-10-CM

## 2022-03-04 DIAGNOSIS — M79.601 BILATERAL ARM PAIN: ICD-10-CM

## 2022-03-04 DIAGNOSIS — Z79.4 ENCOUNTER FOR LONG-TERM (CURRENT) USE OF INSULIN (H): ICD-10-CM

## 2022-03-04 DIAGNOSIS — R73.9 HYPERGLYCEMIA: ICD-10-CM

## 2022-03-04 LAB
ALBUMIN SERPL-MCNC: 4.6 G/DL (ref 3.4–5)
ALP SERPL-CCNC: 128 U/L (ref 40–150)
ALT SERPL W P-5'-P-CCNC: 29 U/L (ref 0–50)
ANION GAP SERPL CALCULATED.3IONS-SCNC: 8 MMOL/L (ref 3–14)
AST SERPL W P-5'-P-CCNC: 14 U/L (ref 0–45)
BASOPHILS # BLD AUTO: 0.1 10E3/UL (ref 0–0.2)
BASOPHILS NFR BLD AUTO: 1 %
BILIRUB SERPL-MCNC: 0.4 MG/DL (ref 0.2–1.3)
BUN SERPL-MCNC: 21 MG/DL (ref 7–30)
CALCIUM SERPL-MCNC: 10.8 MG/DL (ref 8.5–10.1)
CHLORIDE BLD-SCNC: 104 MMOL/L (ref 94–109)
CO2 SERPL-SCNC: 26 MMOL/L (ref 20–32)
CREAT SERPL-MCNC: 0.86 MG/DL (ref 0.52–1.04)
EOSINOPHIL # BLD AUTO: 0.3 10E3/UL (ref 0–0.7)
EOSINOPHIL NFR BLD AUTO: 3 %
ERYTHROCYTE [DISTWIDTH] IN BLOOD BY AUTOMATED COUNT: 13.6 % (ref 10–15)
GFR SERPL CREATININE-BSD FRML MDRD: 79 ML/MIN/1.73M2
GLUCOSE BLD-MCNC: 165 MG/DL (ref 70–99)
HCT VFR BLD AUTO: 42.8 % (ref 35–47)
HGB BLD-MCNC: 13.8 G/DL (ref 11.7–15.7)
IMM GRANULOCYTES # BLD: 0 10E3/UL
IMM GRANULOCYTES NFR BLD: 0 %
LIPASE SERPL-CCNC: 113 U/L (ref 73–393)
LYMPHOCYTES # BLD AUTO: 3.6 10E3/UL (ref 0.8–5.3)
LYMPHOCYTES NFR BLD AUTO: 30 %
MCH RBC QN AUTO: 25 PG (ref 26.5–33)
MCHC RBC AUTO-ENTMCNC: 32.2 G/DL (ref 31.5–36.5)
MCV RBC AUTO: 78 FL (ref 78–100)
MONOCYTES # BLD AUTO: 0.9 10E3/UL (ref 0–1.3)
MONOCYTES NFR BLD AUTO: 8 %
NEUTROPHILS # BLD AUTO: 7.3 10E3/UL (ref 1.6–8.3)
NEUTROPHILS NFR BLD AUTO: 58 %
NRBC # BLD AUTO: 0 10E3/UL
NRBC BLD AUTO-RTO: 0 /100
PLATELET # BLD AUTO: 512 10E3/UL (ref 150–450)
POTASSIUM BLD-SCNC: 3.6 MMOL/L (ref 3.4–5.3)
PROT SERPL-MCNC: 8.8 G/DL (ref 6.8–8.8)
RBC # BLD AUTO: 5.52 10E6/UL (ref 3.8–5.2)
SODIUM SERPL-SCNC: 138 MMOL/L (ref 133–144)
WBC # BLD AUTO: 12.3 10E3/UL (ref 4–11)

## 2022-03-04 PROCEDURE — 96360 HYDRATION IV INFUSION INIT: CPT

## 2022-03-04 PROCEDURE — 93970 EXTREMITY STUDY: CPT | Mod: 26 | Performed by: STUDENT IN AN ORGANIZED HEALTH CARE EDUCATION/TRAINING PROGRAM

## 2022-03-04 PROCEDURE — 93970 EXTREMITY STUDY: CPT

## 2022-03-04 PROCEDURE — 36415 COLL VENOUS BLD VENIPUNCTURE: CPT | Performed by: EMERGENCY MEDICINE

## 2022-03-04 PROCEDURE — 83690 ASSAY OF LIPASE: CPT | Performed by: EMERGENCY MEDICINE

## 2022-03-04 PROCEDURE — 80053 COMPREHEN METABOLIC PANEL: CPT | Performed by: EMERGENCY MEDICINE

## 2022-03-04 PROCEDURE — 258N000003 HC RX IP 258 OP 636: Performed by: EMERGENCY MEDICINE

## 2022-03-04 PROCEDURE — 85025 COMPLETE CBC W/AUTO DIFF WBC: CPT | Performed by: EMERGENCY MEDICINE

## 2022-03-04 PROCEDURE — 99284 EMERGENCY DEPT VISIT MOD MDM: CPT | Performed by: EMERGENCY MEDICINE

## 2022-03-04 PROCEDURE — 99284 EMERGENCY DEPT VISIT MOD MDM: CPT | Mod: 25

## 2022-03-04 RX ADMIN — SODIUM CHLORIDE 1000 ML: 900 INJECTION, SOLUTION INTRAVENOUS at 17:29

## 2022-03-04 ASSESSMENT — ENCOUNTER SYMPTOMS
NECK PAIN: 1
DYSURIA: 0
ABDOMINAL PAIN: 1
APPETITE CHANGE: 1
NAUSEA: 1
VOMITING: 0
SHORTNESS OF BREATH: 1

## 2022-03-04 NOTE — DISCHARGE INSTRUCTIONS
Please make an appointment to follow up with Your Primary Care Provider in 5-7 days for further evaluation and continued care.    Please return the emergency department if you have fever, shortness of breath, chest pain, any other concerns.

## 2022-03-04 NOTE — ED PROVIDER NOTES
"      [place] [facility]MENT ([facility])  3/04/22      History     Chief Complaint   Patient presents with     Arm Pain     both arms     HPI  Janine Cornell is a 56 year old female with a past medical history significant for type 2 diabetes, chronic pancreatitis, CAD s/p stented coronary artery, granulomatosis with polyangiitis, arthritis, chronic pain, hypertension, hyperlipidemia, PCOS, unspecified cerebral artery occlusion with cerebral infarction who presents to the ED for evaluation of left arm pain and swelling.  Per [facility] records, patient received the Pfizer COVID-19 vaccine (02/04/2022, 02/25/2022).  Since then she states she has had swelling in the left arm where she received the shots as well as pain.  She states that after she received the vaccine she could not move her arm due to to the swelling.  She also reports lumps in the arms.  She states since then it has improved but not gone away completely.  She states she feels like her \"tendons are pulling away\" and her arm is constantly flexing.  States she never had an IV in that arm.  She states she received both her shots in the left arm.  States she has never had this type of swelling before.  She also reports swelling and pain in her supraclavicular area.  Patient also reports similar symptoms on the right arm, they are less severe than the right.  Denies any fall or trauma or injury to either arm.    She also reports that her blood sugar has been high, 377 approximately this morning around 8-9 AM.  States she has not eaten much today due to stomach issues lasting a few months.  She also notes that her weight fluctuates between 5-18 pounds.  She also reports her abdomen feels \"bloated\".  Reports upper abdominal pain. States she was feeling feverish earlier and took Tylenol, which did not help her symptoms.  States she has shortness of breath because she has asthma, unchanged from baseline.  Endorses nausea, no vomiting. Denies " dysuria.    She is diabetic and reports that she has been taking her insulin.  She states that in February she was given medications that helped her stomach problems though does not remember which medications it was.  Endorses abdominal surgery history; states she has had several abdominal surgeries, cannot remember what they were.  Per chart review her abdominal surgeries include , laparoscopic cholecystectomy, and hernia repair as well as previous endoscopies, D&C, LEEP procedure.  She endorses still having her uterus and ovaries.  States she has an IUD.  States she is urinating okay and taking diuretics.  States she also has fibromyalgia and rheumatoid arthritis.      CT ABDOMEN PELVIS W/O CONTRAST  2022   IMPRESSION:  1. Unremarkable noncontrast appearance of the pancreas without  significant adjacent inflammatory change.  2. Increased fat deposition throughout the wall of the ascending  colon. Possibly sequela of previous inflammatory insult.  3. Diverticulosis without evidence of diverticulitis.    Wt Readings from Last 4 Encounters:   02/15/22 74.8 kg (165 lb)   02/15/22 75 kg (165 lb 6.4 oz)   22 73.8 kg (162 lb 9.6 oz)   22 75.8 kg (167 lb)       Past Medical History  Past Medical History:   Diagnosis Date     Abnormal glandular Papanicolaou smear of cervix      Allergic rhinitis     Allergy, airborne subst     Arthritis      ASCVD (arteriosclerotic cardiovascular disease)      Chronic pain      Chronic pancreatitis (H)     idiopathic, Tx: PPI, antioxidants, pancreatic enzymes     Common migraine      Coronary artery disease      Costochondritis      Difficulty in walking(719.7)      Dyspnea on exertion      Ectasia, mammary duct     [facility], persistent nipple discharge     Elevated fasting glucose      Gastro-oesophageal reflux disease      Granulomatosis with polyangiitis (H)      Hernia      History of angina      Hyperlipidemia LDL goal < 100       Hypertension goal BP (blood pressure) < 140/90     Essential hypertension     Iron deficiency anemia      Ischemic cardiomyopathy      Menorrhagia      Migraine headaches      Mild persistent asthma      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 Matias, neg w/u, Rx'd w/prednisone     Ventral hernia, unspecified, without mention of obstruction or gangrene 2012     Past Surgical History:   Procedure Laterality Date     C/SECTION, LOW TRANSVERSE           CARDIAC SURGERY      cardiac stent- recent in 16; 4 other stents     DILATION AND CURETTAGE N/A 2016    Procedure: DILATION AND CURETTAGE;  Surgeon: Nahed Butler MD;  Location: [facility] OR     HC UGI ENDOSCOPY W EUS  08    Dr. Pastrana, possible chronic pancreatitis, fatty liver     HERNIA REPAIR  2012    [place] [facility] [place]     INSERT INTRAUTERINE DEVICE N/A 2016    Procedure: INSERT INTRAUTERINE DEVICE;  Surgeon: Nahed Butler MD;  Location: [facility] OR     INT UTERINE FIBRIOD EMBOLIZATION  10/29/2014     LAPAROSCOPIC CHOLECYSTECTOMY  08    Dr. Arnol GRUBBS TX, CERVICAL      s/p LEEP     ORBITOTOMY Right 3/15/2016    Procedure: ORBITOTOMY;  Surgeon: Myron Cyr MD;  Location: [facility]     ORBITOTOMY Right 2017    Procedure: ORBITOTOMY;  RIGHT ORBITOTOMY AND BIOPSY;  Surgeon:  Charis Holbrook MD;  Location:  [facility]     REPAIR PTOSIS Right 11/17/2017    Procedure: REPAIR PTOSIS;  RIGHT UPPER LID PTOSIS REPAIR;  Surgeon: Myron Cyr MD;  Location:  [facility]     UPPER GI ENDOSCOPY  10/21/08    mild gastritis, Dr. Rocky [facility]ART XTHORACIC,COMPLETE, W/O DOPPLER  2/4/04    [facility] WNL, EF 60%     acetaminophen (TYLENOL) 325 MG tablet  albuterol (2.5 MG/3ML) 0.083% neb solution  albuterol (PROAIR HFA, PROVENTIL HFA, VENTOLIN HFA) 108 (90 BASE) MCG/ACT inhaler  aspirin (ASPIRIN LOW DOSE) 81 MG EC tablet  blood glucose (NO BRAND SPECIFIED) lancets standard  blood glucose (NO BRAND SPECIFIED) test strip  blood glucose monitoring (NO BRAND SPECIFIED) meter device kit  blood glucose monitoring (NO BRAND SPECIFIED) test strip  Blood Pressure Monitor KIT  calcium carbonate (TUMS) 500 MG chewable tablet  clopidogrel (PLAVIX) 75 MG tablet  COMPRESSION STOCKINGS  cyclobenzaprine (FLEXERIL) 10 MG tablet  dicyclomine (BENTYL) 20 MG tablet  diphenhydrAMINE (BENADRYL) 25 MG capsule  EPIPEN 2-RIKY 0.3 MG/0.3ML injection  Ergocalciferol (VITAMIN D2) 50 MCG (2000 UT) TABS  esomeprazole (NEXIUM) 40 MG DR capsule  famotidine (PEPCID) 20 MG tablet  fexofenadine (ALLEGRA) 180 MG tablet  fluocinolone (SYNALAR) 0.01 % solution  fluocinonide (LIDEX) 0.05 % external ointment  fluticasone-vilanterol (BREO ELLIPTA) 200-25 MCG/INH inhaler  folic acid (FOLVITE) 1 MG tablet  insulin glargine (LANTUS PEN) 100 UNIT/ML pen  insulin pen needle (BD MARCK U/F) 32G X 4 MM miscellaneous  ketoconazole (NIZORAL) 2 % shampoo  lisinopril-hydrochlorothiazide (ZESTORETIC) 20-25 MG tablet  magnesium 250 MG tablet  metFORMIN (GLUCOPHAGE-XR) 500 MG 24 hr tablet  metoprolol succinate ER (TOPROL-XL) 100 MG 24 hr tablet  metroNIDAZOLE (FLAGYL) 500 MG tablet  montelukast (SINGULAIR) 10 MG tablet  Multiple Vitamin (DAILY-GERALD) TABS  nitroGLYcerin (NITROSTAT) 0.4 MG sublingual tablet  nystatin (MYCOSTATIN) 299778  UNIT/ML suspension  ondansetron (ZOFRAN-ODT) 4 MG ODT tab  ondansetron (ZOFRAN-ODT) 8 MG ODT tab  polyethylene glycol (MIRALAX) 17 GM/Dose powder  pravastatin (PRAVACHOL) 40 MG tablet  sennosides (SENOKOT) 8.6 MG tablet  spironolactone (ALDACTONE) 25 MG tablet  sucralfate (CARAFATE) 1 GM/10ML suspension  SYMBICORT 80-4.5 MCG/ACT Inhaler  terbinafine (LAMISIL) 1 % external cream  terconazole (TERAZOL 3) 80 MG vaginal suppository  traMADol (ULTRAM) 50 MG tablet  vitamin D2 (ERGOCALCIFEROL) 61683 units (1250 mcg) capsule  vitamin D3 (CHOLECALCIFEROL) 50 mcg (2000 units) tablet      Allergies   Allergen Reactions     Amoxicillin-Pot Clavulanate      Augmentin      Unknown possible hives and edema     Azithromycin      Diatrizoate Other (See Comments)     Pt wants this listed because she is allergic to shellfish      Imitrex [Sumatriptan]      Severe face/neck/chest tightness and flushing side effects      Penicillins Hives     Unknown      Pork Allergy      Stomach pain, cramping, diarrhea, itching, nausea and headaches     Shellfish Allergy Hives and Swelling     Sulfa Drugs Hives and Swelling     Zithromax [Azithromycin Dihydrate] Swelling     Unknown      Family History  Family History   Problem Relation Age of Onset     Heart Disease Father 50        heart attack     Cerebrovascular Disease Father      Diabetes Father      Hypertension Father      Depression Father      C.A.D. Father      Hypertension Mother      Arthritis Mother      Heart Disease Mother         long qt syndrome     Thyroid Disease Mother      C.A.D. Mother      Heart Disease Brother 15        MI at 15, 16.      Diabetes Maternal Uncle      Hypertension Maternal Aunt      Hypertension Maternal Uncle      Arthritis Brother         he passed away, had severe arthritis at age 11     Arthritis Maternal Uncle      Eye Disorder Maternal Uncle         cataracts     Neurologic Disorder Sister         migraines     Neurologic Disorder Sister          migraines     Respiratory Son         asthma     Cerebrovascular Disease Maternal Uncle      C.A.D. Brother      Family History Negative Other         neg for RA, SLE     Unknown/Adopted No family hx of         unknown neurological issues in her family, mother was adopted     Skin Cancer No family hx of         No known family hx of skin cancer     Social History   Social History     Tobacco Use     Smoking status: Current Every Day Smoker     Packs/day: 0.20     Years: 1.00     Pack years: 0.20     Types: Cigarettes     Last attempt to quit: 2016     Years since quittin.0     Smokeless tobacco: Never Used   Substance Use Topics     Alcohol use: No     Alcohol/week: 0.0 standard drinks     Drug use: No      Past medical history, past surgical history, medications, allergies, family history, and social history were reviewed with the patient. No additional pertinent items.       Review of Systems   Constitutional: Positive for appetite change.   Respiratory: Positive for shortness of breath.    Gastrointestinal: Positive for abdominal pain and nausea. Negative for vomiting.   Genitourinary: Negative for dysuria.   Musculoskeletal: Positive for neck pain.        Left arm pain and swelling   All other systems reviewed and are negative.    A complete review of systems was performed with pertinent positives and negatives noted in the HPI, and all other systems negative.    Physical Exam   BP: (!) 146/99  Pulse: 108  Temp: (!) 96.7  F (35.9  C)  Resp: 18  SpO2: 99 %  Physical Exam    GEN:  Alert, well developed, no acute distress  HEENT:  PERRL, EOMI, Mucous membranes are moist.   Cardio:  RRR, no murmur, radial pulses equal bilaterally  PULM:  Lungs clear, good air movement, no wheezes, rales   Abd:  Soft, normal bowel sounds, diffuse tenderness with very light palpation  Musculoskeletal: Exam is difficult, because patient has tenderness out of proportion to the exam.  She reports decreased range of motion and  does not cooperative with range of motion testing on exam, however, when distracted, has normal range of motion of the left arm including at the left elbow, shoulder, wrist.  Voluntarily moves the arm to remove her sweatshirt with normal range of motion.  There is no erythema or induration in either arm, no area of fluctuance.  Patient does not allow palpation for lymph node checks as she does not tolerate palpation under her arms.  I do not appreciate any asymmetric swelling with 1 arm over the other.  Neuro:  Alert and oriented X3, Follows commands, moving all extremities spontaneously.  By observation, has normal strength in the hands and full range of motion of the hands and wrist.  Skin:  Warm, dry    ED Course     3:43 PM  The patient was seen and examined by Jennifer Costa MD in Room HW.     Procedures          Labs were reviewed by me and results are shown below.  No sign of DKA.  Bilateral upper extremity ultrasound was done to evaluate for upper extremity DVT or abscess.  Preliminary report is negative, still waiting for radiology final report.         Results for orders placed or performed during the hospital encounter of 03/04/22   Comprehensive metabolic panel     Status: Abnormal   Result Value Ref Range    Sodium 138 133 - 144 mmol/L    Potassium 3.6 3.4 - 5.3 mmol/L    Chloride 104 94 - 109 mmol/L    Carbon Dioxide (CO2) 26 20 - 32 mmol/L    Anion Gap 8 3 - 14 mmol/L    Urea Nitrogen 21 7 - 30 mg/dL    Creatinine 0.86 0.52 - 1.04 mg/dL    Calcium 10.8 (H) 8.5 - 10.1 mg/dL    Glucose 165 (H) 70 - 99 mg/dL    Alkaline Phosphatase 128 40 - 150 U/L    AST 14 0 - 45 U/L    ALT 29 0 - 50 U/L    Protein Total 8.8 6.8 - 8.8 g/dL    Albumin 4.6 3.4 - 5.0 g/dL    Bilirubin Total 0.4 0.2 - 1.3 mg/dL    GFR Estimate 79 >60 mL/min/1.73m2   Lipase     Status: Normal   Result Value Ref Range    Lipase 113 73 - 393 U/L   CBC with platelets and differential     Status: Abnormal   Result Value Ref Range     WBC Count 12.3 (H) 4.0 - 11.0 10e3/uL    RBC Count 5.52 (H) 3.80 - 5.20 10e6/uL    Hemoglobin 13.8 11.7 - 15.7 g/dL    Hematocrit 42.8 35.0 - 47.0 %    MCV 78 78 - 100 fL    MCH 25.0 (L) 26.5 - 33.0 pg    MCHC 32.2 31.5 - 36.5 g/dL    RDW 13.6 10.0 - 15.0 %    Platelet Count 512 (H) 150 - 450 10e3/uL    % Neutrophils 58 %    % Lymphocytes 30 %    % Monocytes 8 %    % Eosinophils 3 %    % Basophils 1 %    % Immature Granulocytes 0 %    NRBCs per 100 WBC 0 <1 /100    Absolute Neutrophils 7.3 1.6 - 8.3 10e3/uL    Absolute Lymphocytes 3.6 0.8 - 5.3 10e3/uL    Absolute Monocytes 0.9 0.0 - 1.3 10e3/uL    Absolute Eosinophils 0.3 0.0 - 0.7 10e3/uL    Absolute Basophils 0.1 0.0 - 0.2 10e3/uL    Absolute Immature Granulocytes 0.0 <=0.4 10e3/uL    Absolute NRBCs 0.0 10e3/uL   CBC with platelets differential     Status: Abnormal    Narrative    The following orders were created for panel order CBC with platelets differential.  Procedure                               Abnormality         Status                     ---------                               -----------         ------                     CBC with platelets and d...[[id number]]  Abnormal            Final result                 Please view results for these tests on the individual orders.     Medications   0.9% sodium chloride BOLUS (1,000 mLs Intravenous New Bag 3/4/22 3570)        Assessments & Plan (with Medical Decision Making)   Patient presents with bilateral arm pain after getting her Covid vaccine, left arm is more painful than the right and Covid vaccine shots were both in the left arm.  There is no sign of infection, abscess, vascular compromise, neuro compromise.  Initial ultrasounds appear negative for DVT, will wait for final read from radiology.  Patient has been having some ongoing abdominal pain, no concerns seen on her labs today.  Will refer her back to her primary care physician for ongoing work-up for this.  Her abdominal pain is not new, she has had  recent CT scan for it.  Blood sugar is not dangerously high, no signs of DKA.  Will advise ongoing management of her diabetes outpatient.    I have reviewed the nursing notes. I have reviewed the findings, diagnosis, plan and need for follow up with the patient.    New Prescriptions    No medications on file       Final diagnoses:   Bilateral arm pain   Hyperglycemia     I, Lilia Kidd, am serving as a trained medical scribe to document services personally performed by Jennifer Costa MD based on the provider's statements to me on March 4, 2022.  This document has been checked and approved by the attending provider.    I, Jennifer Costa MD, was physically present and have reviewed and verified the accuracy of this note documented by Lilia Kidd, medical scribe.      Jennifer Costa MD        --  Jennifer Costa  [facility] EMERGENCY DEPARTMENT  3/4/2022     Jennifer Costa MD  03/04/22 0798

## 2022-03-04 NOTE — ED TRIAGE NOTES
Pt has had bilateral arm swelling since COVID Vaccines the last month. Pain in lymph nodes. Having some Blood sugar changes as well

## 2022-03-04 NOTE — TELEPHONE ENCOUNTER
"*Patient calling for concern swelling at clavicle and  7/10 arm swelling and pain.  Patient had appt yesterday video/ [facility].  -Also reporting BG of 350  And 377 before medications and food today, and increased night sweats.   -No thermometer, denies fever.  -Recommend ED to evaluate elevated BG and escalating arm pain and swelling, ? Infection, vasculitis( on Rituxan last 10/18/21), IDDM.  -First COVID-19 vaccine 2/4/22  -Started having bad pain in LEFT arm, underneath arm down to fingers  - this pain has been consistent since  - The pain awakens her at night  -She was recommended to have PT but has not begun.  -She has been hurting since immunization.  -She sees area at LEFTelbow that looks swollen  -She feels like her arms are like\" Demarco arms\"  -She feels like her arms are flexing and her body is heavy and she isn't sleeping.  She saw Dr Peralta in [facility] for this 2/15/22, has not begun PT  She takes tylenol for pain    Second COVID-19 injection - she reported initial fever 100-102.2      -She has bad headaches along with the fever. She has misplaced her thermometer. Feels afebrile now  -Night sweats have increased in frequency since second injection.     Also reviews:0830  - this morning,  45  -Has not taken insulin 68 units yet as she has not eaten yet.      Ref. Range 2/4/2022 16:35   Hemoglobin A1C Latest Ref Range: 0.0 - 5.6 % 10.8 (H)           Reason for Disposition    Patient wants to be seen    Blood glucose > 240 mg/dL (13.3 mmol/L) AND urine ketones moderate-large (or more than 1+)    Additional Information    Negative: Shock suspected (e.g., cold/pale/clammy skin, too weak to stand, low BP, rapid pulse)    Negative: Similar pain previously and it was from 'heart attack'    Negative: Similar pain previously from 'angina' and not relieved by nitroglycerin    Negative: Sounds like a life-threatening emergency to the triager    Negative: Followed an injury to arm    Negative: Chest " "pain    Negative: Wound looks infected    Negative: Elbow pain is the main symptom    Negative: Difficulty breathing or unusual sweating (e.g., sweating without exertion)    Negative: Chest pain lasting longer than 5 minutes    Negative: Age > 40 and no obvious cause for pain, pain still present even when not moving the arm    Negative: Fever and red area (or area very tender to touch)    Negative: Fever and swollen joint    Negative: Entire arm is swollen    Negative: Patient sounds very sick or weak to the triager    Negative: Red area or streak and large (> 2 in. or 5 cm)    Negative: Cast on wrist or arm and now increasing pain    Negative: Weakness (i.e., loss of strength) in hand or fingers    Negative: Arm pains with exertion (e.g., occurs with walking; goes away on resting)    Negative: Painful rash with multiple small blisters grouped together (i.e., dermatomal distribution or 'band' or 'stripe')    Negative: Looks like a boil, infected sore, deep ulcer, or other infected rash (spreading redness, pus)    Negative: Localized rash is very painful (no fever)    Negative: Numbness (i.e., loss of sensation) in hand or fingers    Negative: Localized pain, redness or hard lump along vein    Negative: Hand or wrist pain is the main symptom    Negative: Unconscious or difficult to awaken    Negative: Acting confused (e.g., disoriented, slurred speech)    Negative: Very weak (can't stand)    Negative: Sounds like a life-threatening emergency to the triager    Negative: Vomiting and signs of dehydration (e.g., very dry mouth, lightheaded, dark urine)    Negative: Blood glucose > 240 mg/dL (13.3 mmol/L) and rapid breathing    Answer Assessment - Initial Assessment Questions  1. BLOOD GLUCOSE: \"What is your blood glucose level?\"       350  2. ONSET: \"When did you check the blood glucose?\"      8 AM  3. USUAL RANGE: \"What is your glucose level usually?\" (e.g., usual fasting morning value, usual evening value)      All " "over the place.   4. KETONES: \"Do you check for ketones (urine or blood test strips)?\" If yes, ask: \"What does the test show now?\"       No  5. TYPE 1 or 2: type 2  6. INSULIN:  Lantus, dose increased to 68 unit(s) yesterday  7. DIABETES PILLS: metformin- taking  8. OTHER SYMPTOMS: constellation of symptoms, leg and body\" heaviness', no SOB. Main c/o is arm pain L>R bilaterally.    Protocols used: ARM PAIN-A-OH, DIABETES - HIGH BLOOD SUGAR-A-OH      "

## 2022-03-07 ENCOUNTER — TELEPHONE (OUTPATIENT)
Dept: GASTROENTEROLOGY | Facility: CLINIC | Age: 57
End: 2022-03-07
Payer: COMMERCIAL

## 2022-03-07 ENCOUNTER — TELEPHONE (OUTPATIENT)
Dept: PHARMACY | Facility: CLINIC | Age: 57
End: 2022-03-07
Payer: COMMERCIAL

## 2022-03-07 DIAGNOSIS — Z11.59 ENCOUNTER FOR SCREENING FOR OTHER VIRAL DISEASES: Primary | ICD-10-CM

## 2022-03-07 RX ORDER — ERGOCALCIFEROL 1.25 MG/1
CAPSULE, LIQUID FILLED ORAL
Qty: 12 CAPSULE | Refills: 0 | Status: SHIPPED | OUTPATIENT
Start: 2022-03-07 | End: 2022-07-28

## 2022-03-07 NOTE — TELEPHONE ENCOUNTER
Screening Questions  BlueKIND OF PREP RedLOCATION [review exclusion criteria] GreenSEDATION TYPE     1. Have you had a positive covid test in the last 90 days?  No      2. Are you active on mychart?  No     3. What insurance is in the chart? [employer]     3.  Ordering/Referring Provider: Kash Solano MD     4. BMI 29.2 [BMI OVER 40-EXTENDED PREP]  If greater than 40 review exclusion criteria [PAC APPT IF @ [facility]]    5.  Respiratory Screening :  [If yes to any of the following HOSPITAL setting only]     Do you use daily home oxygen? no     Do you have mod to severe Obstructive Sleep Apnea? No   [OKAY @ [facility] SH PH RI]   Do you have Pulmonary Hypertension? YES   Do you have UNCONTROLLED asthma? No       6. Have you had a heart or lung transplant? No       7. Are you currently on dialysis? No  [ If yes, G-PREP & HOSPITAL setting only]     8. Do you have chronic kidney disease? No  [ If yes, G-PREP ]    9. Have you had a stroke or Transient ischemic attack (TIA) within 6 months? no (If yes, do not schedule at [facility])    10. In the past 6 months, have you had any heart related issues including cardiomyopathy or heart attack? YES        If yes, did it require cardiac stenting or other implantable device?   Yes does have stents    11. Do you have any implantable devices in your body (pacemaker, defib, LVAD)?  (If yes, schedule at [facility])    12. Do you take nitroglycerin? YES   If yes, how often? As needed  (if yes, HOSPITAL setting ONLY)    13. Are you currently taking any blood thinners? Yes  [IF YES, INFORM PATIENT TO FOLLOW UP W/ ORDERING PROVIDER FOR BRIDGING INSTRUCTIONS]     14. Are you a diabetic? YES   [ If yes, G-PREP ]    15. [FEMALES] Are you currently pregnant?     If yes, how many weeks?     16. Are you taking any prescription pain medications on a routine schedule? YES    [ If yes, EXTENDED PREP.] [If yes, MAC]    17. Do you have any chemical dependencies such as alcohol, street drugs, or methadone?  No   [If yes, MAC]    18. Do you have any history of post-traumatic stress syndrome, severe anxiety or history of psychosis? YES -PTSD and Anxitey    [If yes, MAC]    19. Do you transfer independently?  Depending on pain level        20.  Do you have any issues with constipation?    [ If yes, EXTENDED PREP.]    21. Preferred LOCAL Pharmacy for Pre Prescription       Scheduling Details      Caller : Janine Cornell  (Please ask for phone number if not scheduled by patient)    Type of Procedure Scheduled: EGD only per her request. She does not want to do the colonoscopy   Which Colonoscopy Prep was Sent?:   AIDEE CF PATIENTS & GROEN'S PATIENTS NEEDS EXTENDED PREP  Surgeon: CLEMENTINA  Date of Procedure: 3/31/2022  Location: [facility]      Sedation Type: MAC- due to tramadal use and mental health issues.   Conscious Sedation- Needs  for 6 hours after the procedure  MAC/General-Needs  for 24 hours after procedure    Pre-op Required at [facility] [place], [place] and OR for MAC sedation: YES  (advise patient they will need a pre-op prior to procedure -)      Informed patient they will need an adult  yes   Cannot take any type of public or medical transportation alone    Pre-Procedure Covid test to be completed at [facility] or Externally: yes at [facility]    Confirmed Nurse will call to complete assessment yes     Additional comments: no

## 2022-03-07 NOTE — TELEPHONE ENCOUNTER
VITAMIN D2 50,000IU (ERGO) CAP RX      Last Written Prescription Date:  12-9-21  Last Fill Quantity: 12,   # refills: 0  Last Office Visit : 12-16-21  Future Office visit:  4-22-22    Routing refill request to provider for review/approval because:  Med not on protocol

## 2022-03-07 NOTE — TELEPHONE ENCOUNTER
PHARMACY TELEPHONE ENCOUNTER:     Reason: Discuss recommendation to receive Evusheld        I called this patient today as I was asked to discuss the recommendation to receive Evusheld due to her current therapy of Evusheld. I began the conversation to discuss this recommendation that is coming from Dr. Mckinnon. She responded that she was not interested in a new medication for COVID and would not even be open to discussing it.     Nilesh Blue, Pharm. D.   Medication Therapy Management Pharmacist

## 2022-03-25 ENCOUNTER — TELEPHONE (OUTPATIENT)
Dept: GASTROENTEROLOGY | Facility: CLINIC | Age: 57
End: 2022-03-25

## 2022-03-25 ENCOUNTER — OFFICE VISIT (OUTPATIENT)
Dept: FAMILY MEDICINE | Facility: CLINIC | Age: 57
End: 2022-03-25
Payer: COMMERCIAL

## 2022-03-25 VITALS
SYSTOLIC BLOOD PRESSURE: 124 MMHG | HEIGHT: 63 IN | OXYGEN SATURATION: 98 % | DIASTOLIC BLOOD PRESSURE: 85 MMHG | HEART RATE: 78 BPM | BODY MASS INDEX: 31.36 KG/M2 | WEIGHT: 177 LBS

## 2022-03-25 DIAGNOSIS — I25.10 ASCVD (ARTERIOSCLEROTIC CARDIOVASCULAR DISEASE): ICD-10-CM

## 2022-03-25 DIAGNOSIS — D72.829 LEUKOCYTOSIS, UNSPECIFIED TYPE: Primary | ICD-10-CM

## 2022-03-25 DIAGNOSIS — Z01.818 PREOP GENERAL PHYSICAL EXAM: ICD-10-CM

## 2022-03-25 PROCEDURE — 99215 OFFICE O/P EST HI 40 MIN: CPT | Performed by: FAMILY MEDICINE

## 2022-03-25 NOTE — TELEPHONE ENCOUNTER
Attempted to contact patient regarding upcoming EGD procedure on 3.31.2022 for pre assessment questions. No answer.     Left message to return call to [phone] #[phone]    Covid test scheduled: 3.28.2022    Pre op exam scheduled: 3.25.2022 with Kash Solano MD    Arrival time: 0730    Facility location: [facility]    Sedation type: MAC    Diabetic? Yes    Indication for procedure: epigastric pain    Anticoagulants: Plavix. Holding interval of 5 days.  Pt is not mychart active.  Anticoagulation telephone encounter sent to Milan Peace MD re: plavix.    Corrie Barker RN

## 2022-03-25 NOTE — NURSING NOTE
Janine Cornell is a 56 year old female patient that presents today in clinic for the following:    No chief complaint on file.    The patient's allergies and medications were reviewed as noted. A set of vitals were recorded as noted without incident. The patient does not have any other questions for the provider.    Eleazar Sneed, EMT at 3:11 PM on 3/25/2022

## 2022-03-25 NOTE — PROGRESS NOTES
[facility] [address]  [address] [facility] [address]  Phone: [phone]  Fax: [phone]  Primary Provider: Kash Harrison  Pre-op Performing Provider: KASH HARRISON      PREOPERATIVE EVALUATION:  Today's date: 3/25/2022    Janine Cornell is a 56 year old female who presents for a preoperative evaluation.    Surgical Information:  EGD Dr Zaki Nichole Bijan [facility] March 31 2002  Where patient plans to recover: At home with family  Fax number for surgical facility: Note does not need to be faxed, will be available electronically in Epic.    Type of Anesthesia Anticipated: to be determined    Assessment & Plan     The proposed surgical procedure is considered LOW risk.    Leukocytosis, unspecified type  Do April six  - CBC with platelets differential; Future  - Lab Blood Morphology Pathologist Review; Future    ASCVD (arteriosclerotic cardiovascular disease)  Due for visit  - Adult Cardiology Eval  Referral         Risks and Recommendations:  The patient has the following additional risks and recommendations for perioperative complications:   - No identified additional risk factors other than previously addressed    Medication Instructions:  See HPI    RECOMMENDATION:  APPROVAL GIVEN to proceed with proposed procedure, without further diagnostic evaluation.      45 minutes spent on the date of the encounter doing chart review, history and exam, documentation and further activities per the note      Subjective     HPI related to upcoming procedure: Epigastric discomfort, unable to eat normally even small amounts of food cause discomfort, early satiety, bloating. Has DM 2. We've considered gastric empty or SIBO tests, start with this. Due for colonoscopy but she elects not to do that now. Wt stable. Does have nausea, does have an appetite. Stools vary from one normal firm stool a day to six loose stools, no blood or melena.     Arm pain  slowly improved, was severe after Covid vaccines     Autoimmune disorder see Rheum notes, next rituxan infusion April six.    Elevated WBC unclear cause, will redo labs soon    H/o CAD no sx now but due for cardio f/u    Reviewd meds  She takes all pills in the gene  The gene before she will skip her metformin, the am of EGD will take just 1/2 dose insulin  She is on Plavix and aspirin both.   She will stop Plavix now till done w/ EGD  Per pt she was told by GI  to continue aspirin I've messaged the person who has been reaching out from GI to clarify    Past Medical History:   Diagnosis Date     Abnormal glandular Papanicolaou smear of cervix 1992     Allergic rhinitis     Allergy, airborne subst     Arthritis      ASCVD (arteriosclerotic cardiovascular disease)      Chronic pain      Chronic pancreatitis (H)     idiopathic, Tx: PPI, antioxidants, pancreatic enzymes     Common migraine      Coronary artery disease      Costochondritis      Difficulty in walking(719.7)      Dyspnea on exertion      Ectasia, mammary duct     [facility], persistent n[facility] discharge     Elevated fasting glucose      Gastro-oesophageal reflux disease      Granulomatosis with polyangiitis (H)      Hernia      History of angina      Hyperlipidemia LDL goal < 100      Hypertension goal BP (blood pressure) < 140/90     Essential hypertension     Iron deficiency anemia      Ischemic cardiomyopathy      Menorrhagia      Migraine headaches      Mild persistent asthma      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 Matias, neg w/u, Rx'd w/prednisone     Ventral hernia, unspecified, without mention of obstruction or gangrene 2012     Past Surgical History:   Procedure Laterality Date     C/SECTION, LOW TRANSVERSE           CARDIAC SURGERY      cardiac stent- recent in 16; 4 other stents     DILATION AND CURETTAGE N/A 2016    Procedure: DILATION AND CURETTAGE;  Surgeon: Nahed Butler MD;  [facility]     HC UGI ENDOSCOPY W EUS  08    Dr. Pastrana, possible chronic pancreatitis, fatty liver     HERNIA REPAIR  2012    done at [facility] [place]     INSERT INTRAUTERINE DEVICE N/A 2016    Procedure: INSERT INTRAUTERINE DEVICE;  Surgeon: Nahed Butler MD;  [facility]     INT UTERINE FIBRIOD EMBOLIZATION  10/29/2014     LAPAROSCOPIC CHOLECYSTECTOMY  08    Dr. Arnol GRUBBS TX, CERVICAL      s/p LEEP     ORBITOTOMY Right 3/15/2016    Procedure: ORBITOTOMY;  Surgeon: Myron Cyr MD;  [facility]     ORBITOTOMY Right 2017    Procedure: ORBITOTOMY;  RIGHT ORBITOTOMY AND BIOPSY;  Surgeon: Charis Holbrook MD;  [facility]     REPAIR PTOSIS Right 2017    Procedure: REPAIR PTOSIS;  RIGHT UPPER LID PTOSIS REPAIR;  Surgeon: Myron Cyr MD;  Location: [facility]     UPPER GI ENDOSCOPY  10/21/08    mild gastritis, Dr. Rocky [facility] ECHO HEART XTHORACIC,COMPLETE, W/O DOPPLER  04    [facility] WNL, EF 60%     Current Outpatient Medications   Medication     acetaminophen (TYLENOL) 325 MG tablet     albuterol (2.5 MG/3ML) 0.083% neb solution     albuterol (PROAIR HFA, PROVENTIL HFA, VENTOLIN HFA) 108 (90 BASE) MCG/ACT inhaler     aspirin (ASPIRIN LOW DOSE) 81 MG EC tablet     blood glucose (NO BRAND SPECIFIED)  lancets standard     blood glucose (NO BRAND SPECIFIED) test strip     blood glucose monitoring (NO BRAND SPECIFIED) meter device kit     blood glucose monitoring (NO BRAND SPECIFIED) test strip     Blood Pressure Monitor KIT     calcium carbonate (TUMS) 500 MG chewable tablet     clopidogrel (PLAVIX) 75 MG tablet     COMPRESSION STOCKINGS     cyclobenzaprine (FLEXERIL) 10 MG tablet     dicyclomine (BENTYL) 20 MG tablet     diphenhydrAMINE (BENADRYL) 25 MG capsule     EPIPEN 2-RIKY 0.3 MG/0.3ML injection     Ergocalciferol (VITAMIN D2) 50 MCG (2000 UT) TABS     esomeprazole (NEXIUM) 40 MG DR capsule     famotidine (PEPCID) 20 MG tablet     fexofenadine (ALLEGRA) 180 MG tablet     fluocinolone (SYNALAR) 0.01 % solution     fluocinonide (LIDEX) 0.05 % external ointment     fluticasone-vilanterol (BREO ELLIPTA) 200-25 MCG/INH inhaler     folic acid (FOLVITE) 1 MG tablet     insulin glargine (LANTUS PEN) 100 UNIT/ML pen     insulin pen needle (BD MARCK U/F) 32G X 4 MM miscellaneous     ketoconazole (NIZORAL) 2 % shampoo     lisinopril-hydrochlorothiazide (ZESTORETIC) 20-25 MG tablet     magnesium 250 MG tablet     metFORMIN (GLUCOPHAGE-XR) 500 MG 24 hr tablet     metoprolol succinate ER (TOPROL-XL) 100 MG 24 hr tablet     montelukast (SINGULAIR) 10 MG tablet     Multiple Vitamin (DAILY-GERALD) TABS     nitroGLYcerin (NITROSTAT) 0.4 MG sublingual tablet     nystatin (MYCOSTATIN) 123021 UNIT/ML suspension     ondansetron (ZOFRAN-ODT) 4 MG ODT tab     ondansetron (ZOFRAN-ODT) 8 MG ODT tab     polyethylene glycol (MIRALAX) 17 GM/Dose powder     pravastatin (PRAVACHOL) 40 MG tablet     sennosides (SENOKOT) 8.6 MG tablet     spironolactone (ALDACTONE) 25 MG tablet     sucralfate (CARAFATE) 1 GM/10ML suspension     SYMBICORT 80-4.5 MCG/ACT Inhaler     terbinafine (LAMISIL) 1 % external cream     terconazole (TERAZOL 3) 80 MG vaginal suppository     traMADol (ULTRAM) 50 MG tablet     vitamin D2 (ERGOCALCIFEROL) 76039 units (1250  mcg) capsule     vitamin D3 (CHOLECALCIFEROL) 50 mcg (2000 units) tablet     No current facility-administered medications for this visit.     Allergies   Allergen Reactions     Amoxicillin-Pot Clavulanate      Augmentin      Unknown possible hives and edema     Azithromycin      Diatrizoate Other (See Comments)     Pt wants this listed because she is allergic to shellfish      Imitrex [Sumatriptan]      Severe face/neck/chest tightness and flushing side effects      Penicillins Hives     Unknown      Pork Allergy      Stomach pain, cramping, diarrhea, itching, nausea and headaches     Shellfish Allergy Hives and Swelling     Sulfa Drugs Hives and Swelling     Zithromax [Azithromycin Dihydrate] Swelling     Unknown          Preop Questions 3/25/2022   1. Have you ever had a heart attack or stroke? YES - 2018 last   2. Have you ever had surgery on your heart or blood vessels, such as a stent placement, a coronary artery bypass, or surgery on an artery in your head, neck, heart, or legs? YES - Same   3. Do you have chest pain with activity? No    4. Do you have a history of  heart failure? No   5. Do you currently have a cold, bronchitis or symptoms of other infection? No   6. Do you have a cough, shortness of breath, or wheezing? No   7. Do you or anyone in your family have previous history of blood clots? No   8. Do you or does anyone in your family have a serious bleeding problem such as prolonged bleeding following surgeries or cuts? No   9. Have you ever had problems with anemia or been told to take iron pills? YES - Not on iron, not currently anemia   10. Have you had any abnormal blood loss such as black, tarry or bloody stools, or abnormal vaginal bleeding? No   11. Have you ever had a blood transfusion? No   12. Are you willing to have a blood transfusion if it is medically needed before, during, or after your surgery? Yes   13. Have you or any of your relatives ever had problems with anesthesia? YES - nausea    14. Do you have sleep apnea, excessive snoring or daytime drowsiness? No   15. Do you have any artifical heart valves or other implanted medical devices like a pacemaker, defibrillator, or continuous glucose monitor? No   16. Do you have artificial joints? No   17. Are you allergic to latex? No   18. Is there any chance that you may be pregnant? No       Health Care Directive:  Patient does not have a Health Care Directive or Living Will: Discussed advance care planning with patient; however, patient declined at this time.    Preoperative Review of :   reviewed - controlled substances reflected in medication list.      Status of Chronic Conditions:  See problem list for active medical problems.  Problems all longstanding and stable, except as noted/documented.  See ROS for pertinent symptoms related to these conditions.      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     Priority: Medium     CAD S/P percutaneous coronary angioplasty 02/09/2016     Priority: Medium     Status post coronary angiogram 02/09/2016     Priority: Medium     Telogen effluvium 11/17/2015     Priority: Medium     Tobacco use disorder  10/29/2015     Priority: Medium     Overweight with body mass index (BMI) 25.0-29.9 10/20/2015     Priority: Medium     Type 2 diabetes mellitus with other specified complication (H) 10/19/2015     Priority: Medium     Hyperlipidemia associated with type 2 diabetes mellitus (H) 10/19/2015     Priority: Medium     Hypertension associated with diabetes (H) 10/19/2015     Priority: Medium     Type 2 diabetes, HbA1C goal < 8% (H) 07/17/2014     Priority: Medium     Goal < 8 based on ASCVD/accord study       Seronegative rheumatoid arthritis (H) 01/24/2014     Priority: Medium     Fibromyalgia 10/20/2013     Priority: Medium     Spinal stenosis in cervical region 08/19/2013     Priority: Medium     Breast cancer screening 07/31/2013     Priority: Medium     Vitamin D deficiency 11/30/2012     Priority: Medium     Costochondritis      Priority: Medium     1/12       Uterine leiomyoma 06/08/2012     Priority: Medium     (Problem list name updated by automated process. Provider to review and confirm.)       Iron deficiency anemia 06/08/2012     Priority: Medium     Ischemic cardiomyopathy 02/02/2012     Priority: Medium     EF 35-40% 10/31/11 with acute NSTEMI.  Persistent mid septal and basal inferior hypokinesis with normal LV EF on f/u echo 1/20/12.       Hypertensive heart disease 02/02/2012     Priority: Medium     GERD (gastroesophageal reflux disease) 11/09/2011     Priority: Medium     NSTEMI (non-ST elevated myocardial infarction) (H) 11/01/2011     Priority: Medium     Jovan-septal MI       Hyperlipidemia LDL goal <70 11/01/2011     Priority: Medium     -Lipids 11/1/11:  , , , HDL 32       ASCVD (arteriosclerotic cardiovascular disease) 11/01/2011     Priority: Medium     PCI with stenting of LAD, D2, and prox RCA 11/2/11.   (Right dominant system with no left main dz.)       Common migraine 06/01/2011     Priority: Medium     (Problem list name updated by automated process. Provider to review  and confirm.)       Hypertension goal BP (blood pressure) < 140/90      Priority: Medium     Chronic pancreatitis (H)      Priority: Medium     10/08:  idiopathic, Dr. Hidalgo, Dr. Marcus, Dr. Preston Pastrana. Tx: PPI, antioxidants, pancreatic enzymes.    Has had an extensive workup of her chronic postprandial abdominal pain, bloating, and early satiety including gastric emptying study (3/09) and MRI/MRCP (3/09) in addition to EGD (10/08) and endoscopic ultrasound (11/08).  MRCP was suggestive of idiopathic chronic pancreatitis.   I do have records from Dr. Marcus at [facility].  She was referred to him by Dr. Hidalgo and Dr. Pastrana (though I don't have their records).  She was prescribed antioxidant vitamins and pancreatic enzymes as well as her PPI.  She states she had an allergic reaction to the enzymes (creon) which she attributes to the fact that there is pork in the capsules.  She is frustrated that no one has cured her of her pain.         PCOS (polycystic ovarian syndrome)      Priority: Medium     Moderate persistent asthma      Priority: Medium     Seasonal affective disorder (H)      Priority: Medium     SAD       Allergic rhinitis      Priority: Medium     Allergy, airborne subst        Past Medical History:   Diagnosis Date     Abnormal glandular Papanicolaou smear of cervix 1992     Allergic rhinitis     Allergy, airborne subst     Arthritis      ASCVD (arteriosclerotic cardiovascular disease)      Chronic pain      Chronic pancreatitis (H)     idiopathic, Tx: PPI, antioxidants, pancreatic enzymes     Common migraine      Coronary artery disease      Costochondritis      Difficulty in walking(719.7)      Dyspnea on exertion      Ectasia, mammary duct     [facility], persistent [facility] discharge     Elevated fasting glucose      Gastro-oesophageal reflux disease      Granulomatosis with polyangiitis (H)      Hernia      History of angina      Hyperlipidemia LDL goal < 100      Hypertension goal  BP (blood pressure) < 140/90     Essential hypertension     Iron deficiency anemia      Ischemic cardiomyopathy      Menorrhagia      Migraine headaches      Mild persistent asthma      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 Matias, neg w/u, Rx'd w/prednisone     Ventral hernia, unspecified, without mention of obstruction or gangrene 2012     Past Surgical History:   Procedure Laterality Date     C/SECTION, LOW TRANSVERSE           CARDIAC SURGERY      cardiac stent- recent in 16; 4 other stents     DILATION AND CURETTAGE N/A 2016    Procedure: DILATION AND CURETTAGE;  Surgeon: Nahed Butler MD;  [facility]     HC UGI ENDOSCOPY W EUS  08    Dr. Pastrana, possible chronic pancreatitis, fatty liver     [place] [facility] [place] INTRAUTERINE DEVICE N/A 2016    Procedure: INSERT INTRAUTERINE DEVICE;  Surgeon: Nahed Butler MD;  [facility]     INT UTERINE FIBRIOD EMBOLIZATION  10/29/2014     LAPAROSCOPIC CHOLECYSTECTOMY  08    Dr. Arnol GRUBBS TX, CERVICAL      s/p LEEP     ORBITOTOMY Right 3/15/2016    Procedure: ORBITOTOMY;  Surgeon: Myron Cyr MD;  Location: [facility]     ORBITOTOMY Right 2017    Procedure: ORBITOTOMY;  RIGHT ORBITOTOMY AND BIOPSY;  Surgeon: Yusef  MD Charis;  Location: [facility]     REPAIR PTOSIS Right 11/17/2017    Procedure: REPAIR PTOSIS;  RIGHT UPPER LID PTOSIS REPAIR;  Surgeon: Myron Cyr MD;  Location: [facility]     UPPER GI ENDOSCOPY  10/21/08    mild gastritis, Dr. Rocky CALDERA ECHO HEART XTHORACIC,COMPLETE, W/O DOPPLER  2/4/04    [facility] WNL, EF 60%     Current Outpatient Medications   Medication Sig Dispense Refill     acetaminophen (TYLENOL) 325 MG tablet Take 1-2 tablets (325-650 mg) by mouth every 6 hours as needed for pain (headache) 250 tablet 4     albuterol (2.5 MG/3ML) 0.083% neb solution INL 1 VIAL VIA NEBULIZATION Q 4 TO 6 HOURS PRN  1     albuterol (PROAIR HFA, PROVENTIL HFA, VENTOLIN HFA) 108 (90 BASE) MCG/ACT inhaler Inhale 2 puffs into the lungs every 6 hours as needed for shortness of breath / dyspnea or wheezing 3 Inhaler 1     aspirin (ASPIRIN LOW DOSE) 81 MG EC tablet Take 1 tablet (81 mg) by mouth daily . 30-day refills only. 30 tablet 11     blood glucose (NO BRAND SPECIFIED) lancets standard Use to test blood sugar 1-4 times daily or as directed. 400 each 3     blood glucose (NO BRAND SPECIFIED) test strip Use to test blood sugar 1-4 times daily or as directed. 400 strip 3     blood glucose monitoring (NO BRAND SPECIFIED) meter device kit Use to test blood sugar 4 X times daily or as directed. (Patient taking differently: 1 kit Use to test blood sugar 4 X times daily or as directed.) 1 kit 0     blood glucose monitoring (NO BRAND SPECIFIED) test strip 1 strip by In Vitro route 4 times daily Test as directed. Please dispense three months and three months of refills. Thank you. (Patient taking differently: 1 strip by In Vitro route 4 times daily Test as directed. Please dispense three months and three months of refills. Thank you.) 360 each 3     Blood Pressure Monitor KIT 1 each daily Monitor home blood pressure as instructed by physician.  Dispense [place] blood pressure kit. 1 kit 0     calcium carbonate (TUMS) 500  MG chewable tablet Take 3-4 tablets (1,500-2,000 mg) by mouth daily as needed 90 tablet 3     clopidogrel (PLAVIX) 75 MG tablet Take 1 tablet (75 mg) by mouth daily . 30-day refills only. 30 tablet 11     COMPRESSION STOCKINGS 2 each daily Apply one 10-15 mmHg compression stocking to each lower extgmierty during the day and remove before bedtime. 4 each 2     cyclobenzaprine (FLEXERIL) 10 MG tablet Take 1 tablet (10 mg) by mouth 2 times daily as needed for muscle spasms 60 tablet 3     dicyclomine (BENTYL) 20 MG tablet TAKE 1 TABLET(20 MG) BY MOUTH FOUR TIMES DAILY AS NEEDED. Pls schedule clinic appt for refills. 60 tablet 0     diphenhydrAMINE (BENADRYL) 25 MG capsule Take 1 capsule (25 mg) by mouth nightly as needed for itching or allergies 30 capsule 2     EPIPEN 2-RIKY 0.3 MG/0.3ML injection INJECT 0.3 MG INTO THE MUSCLE PRF ANAPHYALAXIS  0     Ergocalciferol (VITAMIN D2) 50 MCG (2000 UT) TABS Take 2,000 Units by mouth daily 90 tablet 1     esomeprazole (NEXIUM) 40 MG DR capsule Take 1 capsule (40 mg) by mouth every morning (before breakfast) Take 30-60 minutes before eating. 30 capsule 3     famotidine (PEPCID) 20 MG tablet Take 1 tablet (20 mg) by mouth 2 times daily as needed (abdominal pain) 20 tablet 0     fexofenadine (ALLEGRA) 180 MG tablet Take 1 tablet by mouth daily as needed. 90 tablet 3     fluocinolone (SYNALAR) 0.01 % solution Apply topically daily as needed        fluocinonide (LIDEX) 0.05 % external ointment Apply topically as needed        fluticasone-vilanterol (BREO ELLIPTA) 200-25 MCG/INH inhaler Inhale 1 puff into the lungs daily        folic acid (FOLVITE) 1 MG tablet Take 1 tablet by mouth daily 90 tablet 3     insulin glargine (LANTUS PEN) 100 UNIT/ML pen Inject 68 Units Subcutaneous every morning Or as directed. 75 mL 3     insulin pen needle (BD MARCK U/F) 32G X 4 MM miscellaneous USE DAILY OR AS DIRECTED 300 each 3     ketoconazole (NIZORAL) 2 % shampoo Apply topically daily as needed         lisinopril-hydrochlorothiazide (ZESTORETIC) 20-25 MG tablet Take 1 tablet by mouth daily . 30-day refills only. 30 tablet 11     magnesium 250 MG tablet TAKE 1 TABLET(250 MG) BY MOUTH TWICE DAILY 60 tablet 1     metFORMIN (GLUCOPHAGE-XR) 500 MG 24 hr tablet Take 4 tablets (2,000 mg) by mouth daily (with dinner) 120 tablet 11     metoprolol succinate ER (TOPROL-XL) 100 MG 24 hr tablet Take 1 tablet (100 mg) by mouth daily . 30-day refills only. 30 tablet 11     montelukast (SINGULAIR) 10 MG tablet Take 1 tablet (10 mg) by mouth At Bedtime 30 tablet 1     Multiple Vitamin (DAILY-GERALD) TABS Take 1 tablet by mouth daily 90 tablet 0     nitroGLYcerin (NITROSTAT) 0.4 MG sublingual tablet Place 1 tablet (0.4 mg) under the tongue every 5 minutes as needed for chest pain . After 3 doses, if pain persists call 911. 25 tablet 3     nystatin (MYCOSTATIN) 693209 UNIT/ML suspension Take 5 mLs (500,000 Units) by mouth 4 times daily 200 mL 0     ondansetron (ZOFRAN-ODT) 4 MG ODT tab Take 1 tablet (4 mg) by mouth every 8 hours as needed for nausea 12 tablet 0     ondansetron (ZOFRAN-ODT) 8 MG ODT tab Take 1 tablet (8 mg) by mouth every 8 hours as needed for nausea 20 tablet 1     polyethylene glycol (MIRALAX) 17 GM/Dose powder Take 17 g (1 capful) by mouth daily 507 g 1     pravastatin (PRAVACHOL) 40 MG tablet Take 1 tablet (40 mg) by mouth daily . 30-day refills only. 30 tablet 11     sennosides (SENOKOT) 8.6 MG tablet 1-2 tabs a day as needed for constipation 60 tablet 1     spironolactone (ALDACTONE) 25 MG tablet Take 1 tablet (25 mg) by mouth daily . Take one additional 0.5 tab daily as needed for weight gain. 45 day refills. 45 tablet 11     sucralfate (CARAFATE) 1 GM/10ML suspension Take 10 mLs (1 g) by mouth 4 times daily 1200 mL 1     SYMBICORT 80-4.5 MCG/ACT Inhaler        terbinafine (LAMISIL) 1 % external cream Apply topically 2 times daily 42 g 1     terconazole (TERAZOL 3) 80 MG vaginal suppository Place 1  suppository (80 mg) vaginally At Bedtime 3 suppository 1     traMADol (ULTRAM) 50 MG tablet TAKE 1 TABLET(50 MG) BY MOUTH EVERY 8 HOURS AS NEEDED FOR MODERATE PAIN 60 tablet 3     vitamin D2 (ERGOCALCIFEROL) 73288 units (1250 mcg) capsule TAKE 1 CAPSULE BY MOUTH 1 TIME A WEEK 12 capsule 0     vitamin D3 (CHOLECALCIFEROL) 50 mcg (2000 units) tablet Take 1 tablet (50 mcg) by mouth daily 90 tablet 1       Allergies   Allergen Reactions     Amoxicillin-Pot Clavulanate      Augmentin      Unknown possible hives and edema     Azithromycin      Diatrizoate Other (See Comments)     Pt wants this listed because she is allergic to shellfish      Imitrex [Sumatriptan]      Severe face/neck/chest tightness and flushing side effects      Penicillins Hives     Unknown      Pork Allergy      Stomach pain, cramping, diarrhea, itching, nausea and headaches     Shellfish Allergy Hives and Swelling     Sulfa Drugs Hives and Swelling     Zithromax [Azithromycin Dihydrate] Swelling     Unknown         Social History     Tobacco Use     Smoking status: Current Every Day Smoker     Packs/day: 0.20     Years: 1.00     Pack years: 0.20     Types: Cigarettes     Last attempt to quit: 2016     Years since quittin.1     Smokeless tobacco: Never Used   Substance Use Topics     Alcohol use: No     Alcohol/week: 0.0 standard drinks     Family History   Problem Relation Age of Onset     Heart Disease Father 50        heart attack     Cerebrovascular Disease Father      Diabetes Father      Hypertension Father      Depression Father      C.A.D. Father      Hypertension Mother      Arthritis Mother      Heart Disease Mother         long qt syndrome     Thyroid Disease Mother      C.A.D. Mother      Heart Disease Brother 15        MI at 15, 16.      Diabetes Maternal Uncle      Hypertension Maternal Aunt      Hypertension Maternal Uncle      Arthritis Brother         he passed away, had severe arthritis at age 11     Arthritis  "Maternal Uncle      Eye Disorder Maternal Uncle         cataracts     Neurologic Disorder Sister         migraines     Neurologic Disorder Sister         migraines     Respiratory Son         asthma     Cerebrovascular Disease Maternal Uncle      C.A.D. Brother      Family History Negative Other         neg for RA, SLE     Unknown/Adopted No family hx of         unknown neurological issues in her family, mother was adopted     Skin Cancer No family hx of         No known family hx of skin cancer     History   Drug Use No         Objective     /85 (BP Location: Right arm, Patient Position: Sitting, Cuff Size: Adult Regular)   Pulse 78   Ht 1.6 m (5' 3\")   Wt 80.3 kg (177 lb)   SpO2 98%   BMI 31.35 kg/m      Physical Exam    GENERAL APPEARANCE: healthy, alert and no distress     EYES: EOMI, PERRL     HENT: ear canals and TM's normal and nose and mouth without ulcers or lesions     NECK: no adenopathy, no asymmetry, masses, or scars and thyroid normal to palpation     RESP: lungs clear to auscultation - no rales, rhonchi or wheezes     CV: regular rates and rhythm, normal S1 S2, no S3 or S4 and no murmur, click or rub     ABDOMEN:  soft, nontender, no HSM or masses and bowel sounds normal     MS: extremities normal- no gross deformities noted, no evidence of inflammation in joints, FROM in all extremities.     SKIN: no suspicious lesions or rashes     NEURO: Normal strength and tone, sensory exam grossly normal, mentation intact and speech normal     PSYCH: mentation appears normal. and affect normal/bright     LYMPHATICS: No cervical adenopathy    Recent Labs   Lab Test 03/04/22  1607 02/15/22  1323 02/08/22  0918 02/04/22  1635 06/18/20  1520 06/18/20  1500   HGB 13.8 13.0   < > 13.9   < >  --    * 459*   < > 405   < >  --     132*   < > 136   < > 136   POTASSIUM 3.6 3.9   < > 3.8   < > 5.4*   CR 0.86 1.00   < > 0.93   < > 1.80*   A1C  --   --   --  10.8*  --  8.0*    < > = values in this " interval not displayed.        Diagnostics:  Early March labs rvwd, last EKG rvwd, no new tests needed today    Revised Cardiac Risk Index (RCRI):  The patient has the following serious cardiovascular risks for perioperative complications:   - Coronary Artery Disease (MI, positive stress test, angina, Qs on EKG) = 1 point     RCRI Interpretation: 1 point: Class II (low risk - 0.9% complication rate)           Signed Electronically by: Kash Solano MD  Copy of this evaluation report is provided to requesting physician.

## 2022-03-25 NOTE — TELEPHONE ENCOUNTER
Hello,     We wanted to reach out because your patient is having a Upper endoscopy on March / 31 / 2022. The Gastroenterology Service Line is advising that their Plavix be held for 5 days prior to their appointment.     We have advised the patient of these hold recommendations and to follow up with their prescribing/managing provider.     Please contact us if this is NOT advised, please provider alternative recommendations for hold intervals.     Thank you,     Pre-Assessment Endoscopy Nursing Team  P Endoscopy Nurse Harjinder ([id number])

## 2022-03-28 ENCOUNTER — LAB (OUTPATIENT)
Dept: LAB | Facility: CLINIC | Age: 57
End: 2022-03-28
Payer: COMMERCIAL

## 2022-03-28 DIAGNOSIS — Z11.59 ENCOUNTER FOR SCREENING FOR OTHER VIRAL DISEASES: ICD-10-CM

## 2022-03-28 LAB — SARS-COV-2 RNA RESP QL NAA+PROBE: NEGATIVE

## 2022-03-28 PROCEDURE — U0003 INFECTIOUS AGENT DETECTION BY NUCLEIC ACID (DNA OR RNA); SEVERE ACUTE RESPIRATORY SYNDROME CORONAVIRUS 2 (SARS-COV-2) (CORONAVIRUS DISEASE [COVID-19]), AMPLIFIED PROBE TECHNIQUE, MAKING USE OF HIGH THROUGHPUT TECHNOLOGIES AS DESCRIBED BY CMS-2020-01-R: HCPCS | Mod: 90 | Performed by: PATHOLOGY

## 2022-03-28 PROCEDURE — U0005 INFEC AGEN DETEC AMPLI PROBE: HCPCS | Mod: 90 | Performed by: PATHOLOGY

## 2022-03-28 PROCEDURE — 99000 SPECIMEN HANDLING OFFICE-LAB: CPT | Performed by: PATHOLOGY

## 2022-03-28 NOTE — TELEPHONE ENCOUNTER
"\"Agree to stop plavix before GI endoscopy for 5 days and restart then if there is no contra-indication     Thanks     DD\"    Corrie Barker RN    "

## 2022-03-28 NOTE — TELEPHONE ENCOUNTER
Second attempt for pre-assessment prior to upcoming EGD.    No answer.  Left message to return call [phone] #2    Pt was given instruction to hold her plavix for 5 days prior to procedure per pre-op visit and 3.25.2022 Anticoagulation telephone encounter.    Corrie Barker RN

## 2022-03-29 ENCOUNTER — TELEPHONE (OUTPATIENT)
Dept: INTERNAL MEDICINE | Facility: CLINIC | Age: 57
End: 2022-03-29
Payer: COMMERCIAL

## 2022-03-29 NOTE — TELEPHONE ENCOUNTER
Patient returned call.     Pre assessment questions completed for upcoming EGD procedure scheduled on 3/31/22    COVID test scheduled 3/28/22    Pre-op scheduled 3/25/22    Reviewed procedural arrival time 0730 and facility location [facility].    Designated  policy reviewed. Instructed to have someone stay 24 hours post procedure.     Reviewed EGD prep instructions with patient. NPO six hours prior to procedure.     Anticoagulation/blood thinners? Plavix. Patient has been holding 5 days.     Patient was confused as to why endoscopy instructions did not indicate to stop ASA but they received a call today to stop. Informed patient to follow recommended instructions to stop ASA if that is what was directed.     Patient wanting to know what to do with insulin dosing. Advised patient to consult with primary or diabetic educator regarding diabetic medication. Patient verbalized understanding.     Patient verbalized understanding and had no questions or concerns at this time.    Flaca Verdugo RN

## 2022-03-29 NOTE — TELEPHONE ENCOUNTER
Left a detailed message to the pt regarding the recommendation.    Soon-Mi  -----------------------------------------------------------------------------------        ----- Message from Kash Solano MD sent at 3/28/2022  9:23 AM CDT -----  She has upcoming EGD  Can you let her know stop aspirin now  ----- Message -----  From: Corrie Barker RN  Sent: 3/28/2022   9:13 AM CDT  To: MD Dr. Russ Dobbins,    I have been unsuccessful in reaching out to this patient for pre-assessment prior to her EGD.  We typically have pt's hold their ASA for 1-2 days prior to procedure.    Thanks,  Corrie Barker RN  Pre-Assessment Endoscopy  ----- Message -----  From: Kash Solano MD  Sent: 3/25/2022   5:53 PM CDT  To: Corrie Barker RN    I'm her primary  Did preop today  She is on Plavix and aspirin  I told her to hold both, when I said that, she said someone from GI told her it is ok to continue aspirin  I saw your name as someone who'd reached out to her, do you know? Typically I thought had to hold it.Thanks  Horace

## 2022-03-30 ENCOUNTER — TELEPHONE (OUTPATIENT)
Dept: FAMILY MEDICINE | Facility: CLINIC | Age: 57
End: 2022-03-30
Payer: COMMERCIAL

## 2022-03-30 ENCOUNTER — ANESTHESIA EVENT (OUTPATIENT)
Dept: GASTROENTEROLOGY | Facility: CLINIC | Age: 57
End: 2022-03-30
Payer: COMMERCIAL

## 2022-03-31 ENCOUNTER — ANESTHESIA (OUTPATIENT)
Dept: GASTROENTEROLOGY | Facility: CLINIC | Age: 57
End: 2022-03-31
Payer: COMMERCIAL

## 2022-03-31 ENCOUNTER — HOSPITAL ENCOUNTER (OUTPATIENT)
Facility: CLINIC | Age: 57
Discharge: HOME | End: 2022-03-31
Attending: INTERNAL MEDICINE | Admitting: INTERNAL MEDICINE
Payer: COMMERCIAL

## 2022-03-31 VITALS
BODY MASS INDEX: 30.86 KG/M2 | SYSTOLIC BLOOD PRESSURE: 147 MMHG | DIASTOLIC BLOOD PRESSURE: 89 MMHG | HEIGHT: 63 IN | HEART RATE: 75 BPM | TEMPERATURE: 98.7 F | RESPIRATION RATE: 14 BRPM | WEIGHT: 174.16 LBS | OXYGEN SATURATION: 100 %

## 2022-03-31 DIAGNOSIS — K26.3 DUODENAL ULCER, ACUTE: Primary | ICD-10-CM

## 2022-03-31 LAB
GLUCOSE BLDC GLUCOMTR-MCNC: 190 MG/DL (ref 70–99)
UPPER GI ENDOSCOPY: NORMAL

## 2022-03-31 PROCEDURE — 258N000003 HC RX IP 258 OP 636: Performed by: ANESTHESIOLOGY

## 2022-03-31 PROCEDURE — 82962 GLUCOSE BLOOD TEST: CPT

## 2022-03-31 PROCEDURE — 88305 TISSUE EXAM BY PATHOLOGIST: CPT | Mod: 26 | Performed by: PATHOLOGY

## 2022-03-31 PROCEDURE — 250N000011 HC RX IP 250 OP 636: Performed by: INTERNAL MEDICINE

## 2022-03-31 PROCEDURE — 250N000009 HC RX 250: Performed by: REGISTERED NURSE

## 2022-03-31 PROCEDURE — 370N000017 HC ANESTHESIA TECHNICAL FEE, PER MIN: Performed by: INTERNAL MEDICINE

## 2022-03-31 PROCEDURE — 88305 TISSUE EXAM BY PATHOLOGIST: CPT | Mod: TC | Performed by: INTERNAL MEDICINE

## 2022-03-31 PROCEDURE — 43239 EGD BIOPSY SINGLE/MULTIPLE: CPT | Performed by: INTERNAL MEDICINE

## 2022-03-31 PROCEDURE — 250N000011 HC RX IP 250 OP 636: Performed by: ANESTHESIOLOGY

## 2022-03-31 RX ORDER — OXYCODONE HYDROCHLORIDE 5 MG/1
5 TABLET ORAL EVERY 4 HOURS PRN
Status: DISCONTINUED | OUTPATIENT
Start: 2022-03-31 | End: 2022-03-31 | Stop reason: HOSPADM

## 2022-03-31 RX ORDER — PROPOFOL 10 MG/ML
INJECTION, EMULSION INTRAVENOUS CONTINUOUS PRN
Status: DISCONTINUED | OUTPATIENT
Start: 2022-03-31 | End: 2022-03-31

## 2022-03-31 RX ORDER — NALOXONE HYDROCHLORIDE 0.4 MG/ML
0.4 INJECTION, SOLUTION INTRAMUSCULAR; INTRAVENOUS; SUBCUTANEOUS
Status: DISCONTINUED | OUTPATIENT
Start: 2022-03-31 | End: 2022-03-31 | Stop reason: HOSPADM

## 2022-03-31 RX ORDER — FENTANYL CITRATE 50 UG/ML
25 INJECTION, SOLUTION INTRAMUSCULAR; INTRAVENOUS EVERY 5 MIN PRN
Status: DISCONTINUED | OUTPATIENT
Start: 2022-03-31 | End: 2022-03-31 | Stop reason: HOSPADM

## 2022-03-31 RX ORDER — PROCHLORPERAZINE MALEATE 10 MG
10 TABLET ORAL EVERY 6 HOURS PRN
Status: DISCONTINUED | OUTPATIENT
Start: 2022-03-31 | End: 2022-03-31 | Stop reason: HOSPADM

## 2022-03-31 RX ORDER — NALOXONE HYDROCHLORIDE 0.4 MG/ML
0.2 INJECTION, SOLUTION INTRAMUSCULAR; INTRAVENOUS; SUBCUTANEOUS
Status: DISCONTINUED | OUTPATIENT
Start: 2022-03-31 | End: 2022-03-31 | Stop reason: HOSPADM

## 2022-03-31 RX ORDER — HYDRALAZINE HYDROCHLORIDE 20 MG/ML
10 INJECTION INTRAMUSCULAR; INTRAVENOUS
Status: COMPLETED | OUTPATIENT
Start: 2022-03-31 | End: 2022-03-31

## 2022-03-31 RX ORDER — FENTANYL CITRATE 50 UG/ML
25 INJECTION, SOLUTION INTRAMUSCULAR; INTRAVENOUS
Status: DISCONTINUED | OUTPATIENT
Start: 2022-03-31 | End: 2022-03-31 | Stop reason: HOSPADM

## 2022-03-31 RX ORDER — PROPOFOL 10 MG/ML
INJECTION, EMULSION INTRAVENOUS PRN
Status: DISCONTINUED | OUTPATIENT
Start: 2022-03-31 | End: 2022-03-31

## 2022-03-31 RX ORDER — SODIUM CHLORIDE, SODIUM LACTATE, POTASSIUM CHLORIDE, CALCIUM CHLORIDE 600; 310; 30; 20 MG/100ML; MG/100ML; MG/100ML; MG/100ML
INJECTION, SOLUTION INTRAVENOUS CONTINUOUS
Status: DISCONTINUED | OUTPATIENT
Start: 2022-03-31 | End: 2022-03-31 | Stop reason: HOSPADM

## 2022-03-31 RX ORDER — FLUMAZENIL 0.1 MG/ML
0.2 INJECTION, SOLUTION INTRAVENOUS
Status: DISCONTINUED | OUTPATIENT
Start: 2022-03-31 | End: 2022-03-31 | Stop reason: HOSPADM

## 2022-03-31 RX ORDER — ONDANSETRON 2 MG/ML
4 INJECTION INTRAMUSCULAR; INTRAVENOUS EVERY 6 HOURS PRN
Status: DISCONTINUED | OUTPATIENT
Start: 2022-03-31 | End: 2022-03-31 | Stop reason: HOSPADM

## 2022-03-31 RX ORDER — ONDANSETRON 2 MG/ML
4 INJECTION INTRAMUSCULAR; INTRAVENOUS
Status: COMPLETED | OUTPATIENT
Start: 2022-03-31 | End: 2022-03-31

## 2022-03-31 RX ORDER — LIDOCAINE 40 MG/G
CREAM TOPICAL
Status: DISCONTINUED | OUTPATIENT
Start: 2022-03-31 | End: 2022-03-31 | Stop reason: HOSPADM

## 2022-03-31 RX ORDER — ONDANSETRON 4 MG/1
4 TABLET, ORALLY DISINTEGRATING ORAL EVERY 6 HOURS PRN
Status: DISCONTINUED | OUTPATIENT
Start: 2022-03-31 | End: 2022-03-31 | Stop reason: HOSPADM

## 2022-03-31 RX ORDER — ONDANSETRON 2 MG/ML
4 INJECTION INTRAMUSCULAR; INTRAVENOUS EVERY 30 MIN PRN
Status: DISCONTINUED | OUTPATIENT
Start: 2022-03-31 | End: 2022-03-31 | Stop reason: HOSPADM

## 2022-03-31 RX ORDER — SODIUM CHLORIDE, SODIUM LACTATE, POTASSIUM CHLORIDE, CALCIUM CHLORIDE 600; 310; 30; 20 MG/100ML; MG/100ML; MG/100ML; MG/100ML
INJECTION, SOLUTION INTRAVENOUS CONTINUOUS PRN
Status: DISCONTINUED | OUTPATIENT
Start: 2022-03-31 | End: 2022-03-31

## 2022-03-31 RX ORDER — ESOMEPRAZOLE MAGNESIUM 40 MG/1
40 CAPSULE, DELAYED RELEASE ORAL
Qty: 90 CAPSULE | Refills: 0 | Status: SHIPPED | OUTPATIENT
Start: 2022-03-31 | End: 2022-05-14

## 2022-03-31 RX ORDER — ONDANSETRON 4 MG/1
4 TABLET, ORALLY DISINTEGRATING ORAL EVERY 30 MIN PRN
Status: DISCONTINUED | OUTPATIENT
Start: 2022-03-31 | End: 2022-03-31 | Stop reason: HOSPADM

## 2022-03-31 RX ORDER — HYDROMORPHONE HYDROCHLORIDE 1 MG/ML
0.2 INJECTION, SOLUTION INTRAMUSCULAR; INTRAVENOUS; SUBCUTANEOUS EVERY 5 MIN PRN
Status: DISCONTINUED | OUTPATIENT
Start: 2022-03-31 | End: 2022-03-31 | Stop reason: HOSPADM

## 2022-03-31 RX ADMIN — PROPOFOL 150 MCG/KG/MIN: 10 INJECTION, EMULSION INTRAVENOUS at 09:40

## 2022-03-31 RX ADMIN — HYDRALAZINE HYDROCHLORIDE 2 MG: 20 INJECTION INTRAMUSCULAR; INTRAVENOUS at 09:37

## 2022-03-31 RX ADMIN — PROPOFOL 60 MG: 10 INJECTION, EMULSION INTRAVENOUS at 09:42

## 2022-03-31 RX ADMIN — SODIUM CHLORIDE, POTASSIUM CHLORIDE, SODIUM LACTATE AND CALCIUM CHLORIDE: 600; 310; 30; 20 INJECTION, SOLUTION INTRAVENOUS at 09:37

## 2022-03-31 RX ADMIN — ONDANSETRON 4 MG: 2 INJECTION INTRAMUSCULAR; INTRAVENOUS at 08:15

## 2022-03-31 RX ADMIN — PROPOFOL 20 MG: 10 INJECTION, EMULSION INTRAVENOUS at 09:44

## 2022-03-31 RX ADMIN — TOPICAL ANESTHETIC 1 SPRAY: 200 SPRAY DENTAL; PERIODONTAL at 09:30

## 2022-03-31 NOTE — DISCHARGE INSTRUCTIONS
Discharge Instructions after  Upper Endoscopy (EGD)    Activity and Diet  You were given medicine for pain. You may be dizzy or sleepy.  For 24 hours:    Do not drive or use heavy equipment.    Do not make important decisions.    Do not drink any alcohol.  ___ You may return to your regular diet.    Discomfort  You may have a sore throat for 2 to 3 days. It may help to:    Avoid hot liquids for 24 hours.    Use sore throat lozenges.    Gargle as needed with salt water up to 4 times a day. Mix 1 cup of warm water  with 1 teaspoon of salt. Do not swallow.  ___ Your esophagus was dilated (opened) or banded during the exam:    Drink only cool liquids for the rest of the day. Eat a soft diet for the next few days.    You may have a sore chest for 2 to 3 days.    You may take Tylenol (acetaminophen) for pain unless your doctor has told you not to.    Do not take aspirin or ibuprofen (Advil, Motrin) or other NSAIDS  (anti-inflammatory drugs) for ___ days.    Follow-up  ___ We took small tissue samples for study. If you do not have a follow-up visit scheduled,  call your provider s office in 2 weeks for the results.    Other instructions________________________________________________________    When to call us:  Problems are rare. Call right away if you have:    Unusual throat pain or trouble swallowing    Unusual pain in belly or chest that is not relieved by belching or passing air    Black stools (tar-like looking bowel movement)    Temperature above 100.6  F. (37.5  C).    If you vomit blood or have severe pain, go to an emergency room.    If you have questions, call:  Monday to Friday, 8 a.m. to 4:30 p.m.: Central Scheduling Department:[phone]    After hours: Hospital: [phone] (Ask for the GI fellow on call)

## 2022-03-31 NOTE — OR NURSING
Procedure: EGD with biopsies  Sedation:monitored anesthesia care  Specimens: x 1, sent to lab.   O2: per CRNA  Tolerated procedure: well  Pt to recovery area in stable condition accompanied by RN.   Other:  none    Gwen Marquez RN

## 2022-03-31 NOTE — ANESTHESIA PREPROCEDURE EVALUATION
Anesthesia Pre-Procedure Evaluation    Patient: Janine Cornell   MRN: [id number] : 1965        Procedure : Procedure(s):  ESOPHAGOGASTRODUODENOSCOPY (EGD)          Past Medical History:   Diagnosis Date     Abnormal glandular Papanicolaou smear of cervix      Allergic rhinitis     Allergy, airborne subst     Arthritis      ASCVD (arteriosclerotic cardiovascular disease)      Chronic pain      Chronic pancreatitis (H)     idiopathic, Tx: PPI, antioxidants, pancreatic enzymes     Common migraine      Coronary artery disease      Costochondritis      Difficulty in walking(719.7)      Dyspnea on exertion      Ectasia, mammary duct     [facility], persistent nipple discharge     Elevated fasting glucose      Gastro-oesophageal reflux disease      Granulomatosis with polyangiitis (H)      Hernia      History of angina      Hyperlipidemia LDL goal < 100      Hypertension goal BP (blood pressure) < 140/90     Essential hypertension     Iron deficiency anemia      Ischemic cardiomyopathy      Menorrhagia      Migraine headaches      Mild persistent asthma      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 Matias, neg w/u, Rx'd w/prednisone     Ventral hernia, unspecified, without mention of obstruction or gangrene 2012      Past Surgical History:    Procedure Laterality Date     C/SECTION, LOW TRANSVERSE           CARDIAC SURGERY      cardiac stent- recent in 16; 4 other stents     DILATION AND CURETTAGE N/A 2016    Procedure: DILATION AND CURETTAGE;  Surgeon: Nahed Butler MD;  Location: [facility] OR     [facility] UGI ENDOSCOPY W EUS  08    Dr. Pastrana, possible chronic pancreatitis, fatty liver     HERNIA REPAIR  2012    done at [facility]     INSERT INTRAUTERINE DEVICE N/A 2016    Procedure: INSERT INTRAUTERINE DEVICE;  Surgeon: Nahed Butler MD;  Location: [facility] OR     INT UTERINE FIBRIOD EMBOLIZATION  10/29/2014     LAPAROSCOPIC CHOLECYSTECTOMY  08    Dr. Arnol GRUBBS TX, CERVICAL      s/p LEEP     ORBITOTOMY Right 3/15/2016    Procedure: ORBITOTOMY;  Surgeon: Myron Cyr MD;  Location:  [facility]     ORBITOTOMY Right 2017    Procedure: ORBITOTOMY;  RIGHT ORBITOTOMY AND BIOPSY;  Surgeon: Charis Holbrook MD;  [facility]     REPAIR PTOSIS Right 2017    Procedure: REPAIR PTOSIS;  RIGHT UPPER LID PTOSIS REPAIR;  Surgeon: Myron Cyr MD;  Location: [facility]     UPPER GI ENDOSCOPY  10/21/08    mild gastritis, Dr. Rocky [facility] ECHO HEART XTHORACIC,COMPLETE, W/O DOPPLER  04    [facility] WNL, EF 60%      Allergies   Allergen Reactions     Amoxicillin-Pot Clavulanate      Augmentin      Unknown possible hives and edema     Azithromycin      Diatrizoate Other (See Comments)     Pt wants this listed because she is allergic to shellfish      Imitrex [Sumatriptan]      Severe face/neck/chest tightness and flushing side effects      Penicillins Hives     Unknown      Pork Allergy      Stomach pain, cramping, diarrhea, itching, nausea and headaches     Shellfish Allergy Hives and Swelling     Sulfa Drugs Hives and Swelling     Zithromax [Azithromycin Dihydrate] Swelling     Unknown       Social History     Tobacco Use     Smoking status: Current Every Day Smoker     Packs/day: 0.20     Years: 1.00      Pack years: 0.20     Types: Cigarettes     Last attempt to quit: 2016     Years since quittin.1     Smokeless tobacco: Never Used   Substance Use Topics     Alcohol use: No     Alcohol/week: 0.0 standard drinks      Wt Readings from Last 1 Encounters:   22 79 kg (174 lb 2.6 oz)        Anesthesia Evaluation            ROS/MED HX  ENT/Pulmonary:     (+) asthma     Neurologic:     (+) migraines, CVA, TIA,     Cardiovascular:     (+) hypertension--CAD ---ROBERTS.     METS/Exercise Tolerance:     Hematologic:       Musculoskeletal:       GI/Hepatic:     (+) GERD,     Renal/Genitourinary:       Endo:     (+) type II DM, Obesity,     Psychiatric/Substance Use:       Infectious Disease:       Malignancy:       Other:      (+) , H/O Chronic Pain,        Physical Exam    Airway        Mallampati: II   TM distance: > 3 FB   Neck ROM: full   Mouth opening: > 3 cm    Respiratory Devices and Support         Dental  no notable dental history         Cardiovascular   cardiovascular exam normal          Pulmonary   pulmonary exam normal                OUTSIDE LABS:  CBC:   Lab Results   Component Value Date    WBC 12.3 (H) 2022    WBC 14.9 (H) 02/15/2022    HGB 13.8 2022    HGB 13.0 02/15/2022    HCT 42.8 2022    HCT 40.1 02/15/2022     (H) 2022     (H) 02/15/2022     BMP:   Lab Results   Component Value Date     2022     (L) 02/15/2022    POTASSIUM 3.6 2022    POTASSIUM 3.9 02/15/2022    CHLORIDE 104 2022    CHLORIDE 95 02/15/2022    CO2 26 2022    CO2 28 02/15/2022    BUN 21 2022    BUN 20 02/15/2022    CR 0.86 2022    CR 1.00 02/15/2022     (H) 2022     (H) 2022     COAGS:   Lab Results   Component Value Date    PTT 30 2016    INR 0.97 2016     POC:   Lab Results   Component Value Date     (H) 2021    HCG Negative 2021    HCGS Negative 2017     HEPATIC:   Lab Results    Component Value Date    ALBUMIN 4.6 03/04/2022    PROTTOTAL 8.8 03/04/2022    ALT 29 03/04/2022    AST 14 03/04/2022    ALKPHOS 128 03/04/2022    BILITOTAL 0.4 03/04/2022     OTHER:   Lab Results   Component Value Date    LACT 1.2 05/24/2021    A1C 10.8 (H) 02/04/2022    KATHY 10.8 (H) 03/04/2022    PHOS 3.3 05/28/2021    MAG 1.9 05/28/2021    LIPASE 113 03/04/2022    AMYLASE 60 01/27/2016    TSH 0.36 (L) 02/04/2022    T4 1.27 02/04/2022    T3 117 02/08/2022    CRP 6.0 02/04/2022    SED 10 02/04/2022       Anesthesia Plan    ASA Status:  3   NPO Status:  NPO Appropriate    Anesthesia Type: MAC.     - Reason for MAC: chronic cardiopulmonary disease, immobility needed, straight local not clinically adequate              Consents    Anesthesia Plan(s) and associated risks, benefits, and realistic alternatives discussed. Questions answered and patient/representative(s) expressed understanding.     - Discussed: Risks, Benefits and Alternatives for BOTH SEDATION and the PROCEDURE were discussed     - Discussed with:  Patient      - Extended Intubation/Ventilatory Support Discussed: No.      - Patient is DNR/DNI Status: No    Use of blood products discussed: No .     Postoperative Care    Pain management: IV analgesics.   PONV prophylaxis: Ondansetron (or other 5HT-3), Dexamethasone or Solumedrol     Comments:           H&P reviewed: Unable to attach H&P to encounter due to EHR limitations. H&P Update: appropriate H&P reviewed, patient examined. No interval changes since H&P (within 30 days).         Joe Ruby MD

## 2022-03-31 NOTE — LETTER
April 5, 2022      Janine Cornell  [address] [facility] [address]        Dear MsEfrenKraig,    The pathology results returned from the biopsies taken at your recent endoscopy.    The duodenum (small bowel) biopsies showed changes consistent with an ulcer.  There was no evidence of infection or malignancy.      You should continue esomeprazole twice per day as directed.  No additional testing is needed at this time.    Sincerely,               Enzo Haines MD   [facility], [place], ENDOSCOPY  [address] [facility] [address]  Phone: [phone]

## 2022-03-31 NOTE — ANESTHESIA POSTPROCEDURE EVALUATION
Patient: Janine Cornell    Procedure: Procedure(s):  ESOPHAGOGASTRODUODENOSCOPY, WITH BIOPSY       Anesthesia Type:  MAC    Note:  Disposition: Outpatient   Postop Pain Control: Uneventful            Sign Out: Well controlled pain   PONV: No   Neuro/Psych: Uneventful            Sign Out: Acceptable/Baseline neuro status   Airway/Respiratory: Uneventful            Sign Out: Acceptable/Baseline resp. status   CV/Hemodynamics: Uneventful            Sign Out: Acceptable CV status; No obvious hypovolemia; No obvious fluid overload   Other NRE: NONE   DID A NON-ROUTINE EVENT OCCUR? No           Last vitals:  Vitals Value Taken Time   /82 03/31/22 1020   Temp 37.1  C (98.7  F) 03/31/22 1010   Pulse 72 03/31/22 1020   Resp 14 03/31/22 1020   SpO2 100 % 03/31/22 1020       Electronically Signed By: Joe Ruby MD  March 31, 2022  10:29 AM

## 2022-03-31 NOTE — ANESTHESIA POSTPROCEDURE EVALUATION
Patient: Janine Cornell    Procedure: Procedure(s):  ESOPHAGOGASTRODUODENOSCOPY, WITH BIOPSY       Anesthesia Type:  MAC    Note:  Disposition: Outpatient   Postop Pain Control: Uneventful            Sign Out: Well controlled pain   PONV: No   Neuro/Psych: Uneventful            Sign Out: Acceptable/Baseline neuro status   Airway/Respiratory: Uneventful            Sign Out: Acceptable/Baseline resp. status   CV/Hemodynamics: Uneventful            Sign Out: Acceptable CV status; No obvious hypovolemia; No obvious fluid overload   Other NRE: NONE   DID A NON-ROUTINE EVENT OCCUR? No           Last vitals:  Vitals Value Taken Time   BP     Temp     Pulse     Resp     SpO2         Electronically Signed By: Joe Ruby MD  March 31, 2022  10:17 AM

## 2022-03-31 NOTE — ANESTHESIA CARE TRANSFER NOTE
Patient: Janine Cornell    Procedure: Procedure(s):  ESOPHAGOGASTRODUODENOSCOPY, WITH BIOPSY       Diagnosis: Epigastric pain [R10.13]  Diagnosis Additional Information: No value filed.    Anesthesia Type:   MAC     Note:    Oropharynx: oropharynx clear of all foreign objects and spontaneously breathing  Level of Consciousness: awake  Oxygen Supplementation: room air    Independent Airway: airway patency satisfactory and stable  Dentition: dentition unchanged  Vital Signs Stable: post-procedure vital signs reviewed and stable  Report to RN Given: handoff report given  Patient transferred to: Phase II    Handoff Report: Identifed the Patient, Identified the Reponsible Provider, Reviewed the pertinent medical history, Discussed the surgical course, Reviewed Intra-OP anesthesia mangement and issues during anesthesia, Set expectations for post-procedure period and Allowed opportunity for questions and acknowledgement of understanding      Vitals:  Vitals Value Taken Time   /88 1005 3/31/22   Temp     Pulse 69 1005 3/31/22   Resp     SpO2 100% 1005 3/31/22       Electronically Signed By: KAYLYNN Alejandro CRNA  March 31, 2022  10:06 AM

## 2022-04-02 LAB
PATH REPORT.COMMENTS IMP SPEC: NORMAL
PATH REPORT.FINAL DX SPEC: NORMAL
PATH REPORT.GROSS SPEC: NORMAL
PATH REPORT.MICROSCOPIC SPEC OTHER STN: NORMAL
PATH REPORT.RELEVANT HX SPEC: NORMAL
PHOTO IMAGE: NORMAL

## 2022-04-05 DIAGNOSIS — M19.90 INFLAMMATORY ARTHRITIS: ICD-10-CM

## 2022-04-06 ENCOUNTER — INFUSION THERAPY VISIT (OUTPATIENT)
Dept: INFUSION THERAPY | Facility: CLINIC | Age: 57
End: 2022-04-06
Attending: INTERNAL MEDICINE
Payer: COMMERCIAL

## 2022-04-06 VITALS
BODY MASS INDEX: 30.89 KG/M2 | WEIGHT: 174.4 LBS | SYSTOLIC BLOOD PRESSURE: 126 MMHG | OXYGEN SATURATION: 98 % | TEMPERATURE: 98.3 F | DIASTOLIC BLOOD PRESSURE: 65 MMHG | HEART RATE: 97 BPM | RESPIRATION RATE: 16 BRPM

## 2022-04-06 DIAGNOSIS — I77.82 ANCA-ASSOCIATED VASCULITIS (H): ICD-10-CM

## 2022-04-06 DIAGNOSIS — M31.30 GRANULOMATOSIS WITH POLYANGIITIS WITHOUT RENAL INVOLVEMENT (H): Primary | ICD-10-CM

## 2022-04-06 PROCEDURE — 258N000003 HC RX IP 258 OP 636: Performed by: INTERNAL MEDICINE

## 2022-04-06 PROCEDURE — 96375 TX/PRO/DX INJ NEW DRUG ADDON: CPT

## 2022-04-06 PROCEDURE — 250N000013 HC RX MED GY IP 250 OP 250 PS 637: Performed by: INTERNAL MEDICINE

## 2022-04-06 PROCEDURE — 96367 TX/PROPH/DG ADDL SEQ IV INF: CPT

## 2022-04-06 PROCEDURE — 96415 CHEMO IV INFUSION ADDL HR: CPT

## 2022-04-06 PROCEDURE — 96413 CHEMO IV INFUSION 1 HR: CPT

## 2022-04-06 PROCEDURE — 250N000011 HC RX IP 250 OP 636: Performed by: INTERNAL MEDICINE

## 2022-04-06 RX ORDER — ACETAMINOPHEN 325 MG/1
650 TABLET ORAL ONCE
Status: COMPLETED | OUTPATIENT
Start: 2022-04-06 | End: 2022-04-06

## 2022-04-06 RX ORDER — FOLIC ACID 1 MG/1
1 TABLET ORAL DAILY
Qty: 90 TABLET | Refills: 2 | Status: SHIPPED | OUTPATIENT
Start: 2022-04-06 | End: 2022-08-19

## 2022-04-06 RX ORDER — HEPARIN SODIUM (PORCINE) LOCK FLUSH IV SOLN 100 UNIT/ML 100 UNIT/ML
5 SOLUTION INTRAVENOUS
Status: CANCELLED | OUTPATIENT
Start: 2022-04-06

## 2022-04-06 RX ORDER — HEPARIN SODIUM,PORCINE 10 UNIT/ML
5 VIAL (ML) INTRAVENOUS
Status: CANCELLED | OUTPATIENT
Start: 2022-04-06

## 2022-04-06 RX ORDER — ACETAMINOPHEN 325 MG/1
650 TABLET ORAL ONCE
Status: CANCELLED
Start: 2022-04-06

## 2022-04-06 RX ORDER — METHYLPREDNISOLONE SODIUM SUCCINATE 125 MG/2ML
125 INJECTION, POWDER, LYOPHILIZED, FOR SOLUTION INTRAMUSCULAR; INTRAVENOUS ONCE
Status: CANCELLED | OUTPATIENT
Start: 2022-04-06

## 2022-04-06 RX ORDER — EPINEPHRINE 1 MG/ML
0.3 INJECTION, SOLUTION, CONCENTRATE INTRAVENOUS EVERY 5 MIN PRN
Status: CANCELLED | OUTPATIENT
Start: 2022-04-06

## 2022-04-06 RX ORDER — MEPERIDINE HYDROCHLORIDE 25 MG/ML
25 INJECTION INTRAMUSCULAR; INTRAVENOUS; SUBCUTANEOUS EVERY 30 MIN PRN
Status: CANCELLED | OUTPATIENT
Start: 2022-04-06

## 2022-04-06 RX ORDER — METHYLPREDNISOLONE SODIUM SUCCINATE 125 MG/2ML
125 INJECTION, POWDER, LYOPHILIZED, FOR SOLUTION INTRAMUSCULAR; INTRAVENOUS ONCE
Status: COMPLETED | OUTPATIENT
Start: 2022-04-06 | End: 2022-04-06

## 2022-04-06 RX ORDER — NALOXONE HYDROCHLORIDE 0.4 MG/ML
0.2 INJECTION, SOLUTION INTRAMUSCULAR; INTRAVENOUS; SUBCUTANEOUS
Status: CANCELLED | OUTPATIENT
Start: 2022-04-06

## 2022-04-06 RX ORDER — DIPHENHYDRAMINE HYDROCHLORIDE 50 MG/ML
50 INJECTION INTRAMUSCULAR; INTRAVENOUS
Status: CANCELLED
Start: 2022-04-06

## 2022-04-06 RX ORDER — METHYLPREDNISOLONE SODIUM SUCCINATE 125 MG/2ML
125 INJECTION, POWDER, LYOPHILIZED, FOR SOLUTION INTRAMUSCULAR; INTRAVENOUS
Status: CANCELLED
Start: 2022-04-06

## 2022-04-06 RX ORDER — ALBUTEROL SULFATE 90 UG/1
1-2 AEROSOL, METERED RESPIRATORY (INHALATION)
Status: CANCELLED
Start: 2022-04-06

## 2022-04-06 RX ORDER — ALBUTEROL SULFATE 0.83 MG/ML
2.5 SOLUTION RESPIRATORY (INHALATION)
Status: CANCELLED | OUTPATIENT
Start: 2022-04-06

## 2022-04-06 RX ADMIN — ACETAMINOPHEN 650 MG: 325 TABLET, FILM COATED ORAL at 10:09

## 2022-04-06 RX ADMIN — METHYLPREDNISOLONE SODIUM SUCCINATE 125 MG: 125 INJECTION, POWDER, FOR SOLUTION INTRAMUSCULAR; INTRAVENOUS at 10:10

## 2022-04-06 RX ADMIN — DIPHENHYDRAMINE HYDROCHLORIDE 50 MG: 50 INJECTION, SOLUTION INTRAMUSCULAR; INTRAVENOUS at 10:20

## 2022-04-06 RX ADMIN — RITUXIMAB-ABBS 1000 MG: 10 INJECTION, SOLUTION INTRAVENOUS at 10:47

## 2022-04-06 NOTE — PROGRESS NOTES
"Infusion Nursing Note:  Janine Cornell presents today for Rituximab.    Patient seen by provider today: No   present during visit today: Not Applicable.    Note: Patient rated current headache 5/10. Patient c/o increasing fatigue, neuropathy in bi-lat hands and feet, increasing dizziness, blurred vision and increasing pain and swelling in right eye with headaches, SOB with exertion, continuing tinnitus, diarrhea (Hx IBS), moderate to severe muscle and joint pain that limits her activity.        Intravenous Access:  Peripheral IV placed left hand.    Treatment Conditions:  Biological Infusion Checklist:  ~~~ NOTE: If the patient answers yes to any of the questions below, hold the infusion and contact ordering provider or on-call provider.    1. Have you recently had an elevated temperature, fever, chills, productive cough, coughing for 3 weeks or longer or hemoptysis, abnormal vital signs, night sweats,  chest pain or have you noticed a decrease in your appetite, unexplained weight loss or fatigue? Yes, Fatigue and Night Sweats.  2. Do you have any open wounds or new incisions? No  3. Do you have any recent or upcoming hospitalizations, surgeries or dental procedures? No  4. Do you currently have or recently have had any signs of illness or infection or are you on any antibiotics? Yes, \"My WBC elevevated for quite awhile\" states patient.   5. Have you had any new, sudden or worsening abdominal pain? No  6. Have you or anyone in your household received a live vaccination in the past 4 weeks? Please note:  No live vaccines while on biologic/chemotherapy until 6 months after the last treatment.  Patient can receive the flu vaccine (shot only) and the pneumovax.  It is optimal for the patient to get these vaccines mid cycle, but they can be given at any time as long as it is not on the day of the infusion. No  7. Have you recently been diagnosed with any new nervous system diseases (ie. Multiple sclerosis, " "Guillain Ponder, seizures, neurological changes) or cancer diagnosis? No  8. Are you on any form of radiation or chemotherapy? No  9. Are you pregnant or breast feeding or do you have plans of pregnancy in the future? No  10. Have you been having any signs of worsening depression or suicidal ideations?  (benlysta only) No  11. Have there been any other new onset medical symptoms? No   Patient had 2 \"yes\" answers, Dr. Mckinnon called, replied that those conditions are chronic, and it is OK to start infusion today.         Post Infusion Assessment:  Patient tolerated infusion without incident.  Site patent and intact, free from redness, edema or discomfort.  No evidence of extravasations.  Access discontinued per protocol.       Discharge Plan:   Discharge instructions reviewed with: Patient.  Patient and/or family verbalized understanding of discharge instructions and all questions answered.  Patient discharged in stable condition accompanied by: self.  Departure Mode: Ambulatory.      Ese White RN                      "

## 2022-04-22 ENCOUNTER — OFFICE VISIT (OUTPATIENT)
Dept: RHEUMATOLOGY | Facility: CLINIC | Age: 57
End: 2022-04-22
Attending: INTERNAL MEDICINE
Payer: COMMERCIAL

## 2022-04-22 VITALS
TEMPERATURE: 98.4 F | SYSTOLIC BLOOD PRESSURE: 118 MMHG | HEART RATE: 74 BPM | RESPIRATION RATE: 16 BRPM | DIASTOLIC BLOOD PRESSURE: 81 MMHG | WEIGHT: 177 LBS | OXYGEN SATURATION: 99 % | BODY MASS INDEX: 31.35 KG/M2

## 2022-04-22 DIAGNOSIS — R21 FACIAL RASH: ICD-10-CM

## 2022-04-22 DIAGNOSIS — B37.0 THRUSH: ICD-10-CM

## 2022-04-22 DIAGNOSIS — Z51.81 ENCOUNTER FOR MEDICATION MONITORING: ICD-10-CM

## 2022-04-22 DIAGNOSIS — I77.82 ANCA-ASSOCIATED VASCULITIS (H): Primary | ICD-10-CM

## 2022-04-22 DIAGNOSIS — B37.31 VAGINAL YEAST INFECTION: ICD-10-CM

## 2022-04-22 PROCEDURE — G0463 HOSPITAL OUTPT CLINIC VISIT: HCPCS

## 2022-04-22 PROCEDURE — 99215 OFFICE O/P EST HI 40 MIN: CPT | Performed by: INTERNAL MEDICINE

## 2022-04-22 RX ORDER — FLUCONAZOLE 200 MG/1
200 TABLET ORAL DAILY
Qty: 14 TABLET | Refills: 0 | Status: SHIPPED | OUTPATIENT
Start: 2022-04-22 | End: 2022-05-14

## 2022-04-22 ASSESSMENT — PAIN SCALES - GENERAL: PAINLEVEL: NO PAIN (0)

## 2022-04-22 NOTE — PATIENT INSTRUCTIONS
Labs on 5/4/2022    Plan for repeat rituximab 1000 mg one dose in early October with less steroid (solumedrol 80 mg IV)    Fluconazole 200 mg once daily for 8 to 14 days     Try nizoral shampoo    Derm referral    Follow up with Dr. Saulo HALEY follow up    Return in Aug (in person)

## 2022-04-22 NOTE — PROGRESS NOTES
Rheumatology F/U In Person Visit Note    Last visit note: 12/16/2021    Today's visit date: 4/22/2022    Reason for in person visit: F/U GPA      HPI     Ms. 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 She is on HCQ since 5/2014. On rituximab since 12/20218 with great response. Previously tried and did not tolerate MTX, AZA.      She had lateral orbitotomy and debulking orbital mass right eye on 9/26/18 with Dr. Shah and Dr. Valdez at [place]. 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 There were no complications according to the op note.      4/21/2021: Had last rituximab 1 gram on 1/19, 2/2/2021. Reports rituximab starts to wear off earlier, has more sx this time. Eye swelling is intermittent. Gets indigestion/ GERD sx with constipation after the infusion.    She reports having asthma, swelling of neck, arms. She thinks that it could be from her vasculitis. She is worried about going down on rituximab dose.     Otherwise unchanged sx, no SOB or hemoptysis or persistent cough, or     Somedays her knees and hips hurt a lot, feel like bone on bone in her knees, especially on changing position.    5/10/2021:      Joint ache, knees hurt, R elbow hurts, R arm hurts, R hand hurts. Has lots of swelling in her joints especially hands.    Depending on weather, joints jhurt, sometimes pain is 7/10.    Has problem with taking stairs and balance.    Gets off balance.     R eye gets tinglin, swelling.    Gets out of breath, gets worse with seasonal allergies.    Over past month and half, took nitro more, like 8 times.    No hemooptysis, no hematuria.    Has allergies.      8/20/2021: Janine has pain over arms, knees. Some days, feel stiff all day long. Some days can't do stairs. Hard to tell if there is swelling as has more water retention during summer.    Some days, eye swells up like today. No fevers,  SOB, CP or cough.    Has rosacea but some days wakes up with heat rash over sun. Her face is peeling.    Sometimes can't lift things or grab things with pain from elbow to hands. Has shoulder and neck pain but this forearm pain is new.    Stopped HCQ in mid July due to concern for potential HCQ toxicity on OCT, her eye exam with Dr. Vernon has been re-scheduled and upcoming on 9/14 to address HCQ toxicity, pain is not worse off HCQ.    Not COVID vaccinated but is cautious.      12/16/2021: Janine presents for follow up. Reports ESOB with cold, has ongoing joint pain. R eye pain is intermittent.    Reports headaches after rituximab 1 gram on 10/18/2021.    Last week, her R knee was hurting.      Today 4/22/2022: Janine presents for follow up.    Each rituximab infusion causes vaginal yeast infection and thrush related to rise in BG. Her BG has stayed in higher level since last rituximab. Has more ache and pain, myalgia and arthralgia. Recently has had headaches with high BP. Had last rituximab 1000 mg on 4/6.    Her R eye is itching and swelling up.    Today, her BG is 177.    Has gained 20 lbs since Feb 2022.    Had severe pain in her arm after covid vaccine, it took a month to get better.        Component      Latest Ref Rng 2/28/2013 2/28/2013          12:14 PM 12:14 PM   WBC      4.0 - 11.0 10e9/L     RBC Count      3.8 - 5.2 10e12/L     Hemoglobin      11.7 - 15.7 g/dL     Hematocrit      35.0 - 47.0 %     MCV      78 - 100 fl     MCH      26.5 - 33.0 pg     MCHC      31.5 - 36.5 g/dL     RDW      10.0 - 15.0 %     Platelet Count      150 - 450 10e9/L     Specimen Description           Lyme Screen IgG and IgM           Vitamin D Deficiency screening      30 - 75 ug/L     Ferritin      10 - 300 ng/mL     Sed Rate      0 - 20 mm/h     ALLI Screen by EIA      <1.0     Rheumatoid Factor      0 - 14 IU/mL     CK Total      30 - 225 U/L  78   Uric Acid      2.5 - 7.5 mg/dL 6.7      Component      Latest Ref Rng 2/28/2013           12:14 PM   WBC      4.0 - 11.0 10e9/L    RBC Count      3.8 - 5.2 10e12/L    Hemoglobin      11.7 - 15.7 g/dL    Hematocrit      35.0 - 47.0 %    MCV      78 - 100 fl    MCH      26.5 - 33.0 pg    MCHC      31.5 - 36.5 g/dL    RDW      10.0 - 15.0 %    Platelet Count      150 - 450 10e9/L    Specimen Description       Serum   Lyme Screen IgG and IgM       Test value: <0.75....Interpretation: Negative....If you highly suspect Lyme . . .   Vitamin D Deficiency screening      30 - 75 ug/L    Ferritin      10 - 300 ng/mL    Sed Rate      0 - 20 mm/h    ALLI Screen by EIA      <1.0    Rheumatoid Factor      0 - 14 IU/mL    CK Total      30 - 225 U/L    Uric Acid      2.5 - 7.5 mg/dL      Component      Latest Ref Rng 2/28/2013 2/28/2013 2/28/2013 2/28/2013          12:14 PM 12:14 PM 12:14 PM 12:14 PM   WBC      4.0 - 11.0 10e9/L       RBC Count      3.8 - 5.2 10e12/L       Hemoglobin      11.7 - 15.7 g/dL       Hematocrit      35.0 - 47.0 %       MCV      78 - 100 fl       MCH      26.5 - 33.0 pg       MCHC      31.5 - 36.5 g/dL       RDW      10.0 - 15.0 %       Platelet Count      150 - 450 10e9/L       Specimen Description             Lyme Screen IgG and IgM             Vitamin D Deficiency screening      30 - 75 ug/L       Ferritin      10 - 300 ng/mL    10   Sed Rate      0 - 20 mm/h   23 (H)    ALLI Screen by EIA      <1.0  <1.0 . . .     Rheumatoid Factor      0 - 14 IU/mL 26 (H)      CK Total      30 - 225 U/L       Uric Acid      2.5 - 7.5 mg/dL         5/2013  CBC WITH PLATELETS DIFFERENTIAL       Component Value Range    WBC 11.3 (*) 4.0 - 11.0 10e9/L    RBC Count 4.56  3.8 - 5.2 10e12/L    Hemoglobin 11.1 (*) 11.7 - 15.7 g/dL    Hematocrit 34.3 (*) 35.0 - 47.0 %    MCV 75 (*) 78 - 100 fl    MCH 24.3 (*) 26.5 - 33.0 pg    MCHC 32.4  31.5 - 36.5 g/dL    RDW 16.1 (*) 10.0 - 15.0 %    Platelet Count 315  150 - 450 10e9/L    Diff Method Automated Method      % Neutrophils 71.6  40 - 75 %    % Lymphocytes 20.9   20 - 48 %    % Monocytes 4.3  0 - 12 %    % Eosinophils 2.8  0 - 6 %    % Basophils 0.2  0 - 2 %    % Immature Granulocytes 0.2  0 - 0.4 %    Absolute Neutrophil 8.1  1.6 - 8.3 10e9/L    Absolute Lymphocytes 2.4  0.8 - 5.3 10e9/L    Absolute Monoctyes 0.5  0.0 - 1.3 10e9/L    Absolute Eosinophils 0.3  0.0 - 0.7 10e9/L    Absolute Basophils 0.0  0.0 - 0.2 10e9/L    Abs Immature Granulocytes 0.0  0 - 0.03 10e9/L   AMYLASE       Component Value Range    Amylase 103  30 - 110 U/L   COMPREHENSIVE METABOLIC PANEL       Component Value Range    Sodium 144  133 - 144 mmol/L    Potassium 3.8  3.4 - 5.3 mmol/L    Chloride 105  94 - 109 mmol/L    Carbon Dioxide 23  20 - 32 mmol/L    Anion Gap 17  6 - 17 mmol/L    Glucose 173 (*) 60 - 99 mg/dL    Urea Nitrogen 13  5 - 24 mg/dL    Creatinine 0.83  0.52 - 1.04 mg/dL    GFR Estimate 74  >60 mL/min/1.7m2    GFR Estimate If Black 90  >60 mL/min/1.7m2    Calcium 9.7  8.5 - 10.4 mg/dL    Bilirubin Total 0.4  0.2 - 1.3 mg/dL    Albumin 4.3  3.9 - 5.1 g/dL    Protein Total 7.8  6.8 - 8.8 g/dL    Alkaline Phosphatase 66  40 - 150 U/L    ALT 36  0 - 50 U/L    AST 28  0 - 45 U/L   CK TOTAL       Component Value Range    CK Total 66  30 - 225 U/L   CRP INFLAMMATION       Component Value Range    CRP Inflammation 10.4 (*) 0.0 - 8.0 mg/L   LIPASE       Component Value Range    Lipase 353 (*) 20 - 250 U/L   ERYTHROCYTE SEDIMENTATION RATE AUTO       Component Value Range    Sed Rate 26 (*) 0 - 20 mm/h   ROUTINE UA WITH MICROSCOPIC REFLEX TO CULTURE       Component Value Range    Color Urine Yellow      Appearance Urine Slightly Cloudy      Glucose Urine 30 (*) NEG mg/dL    Bilirubin Urine Negative  NEG    Ketones Urine 5 (*) NEG mg/dL    Specific Gravity Urine 1.026  1.003 - 1.035    Blood Urine Trace (*) NEG    pH Urine 5.0  5.0 - 7.0 pH    Protein Albumin Urine 10 (*) NEG mg/dL    Urobilinogen mg/dL Normal  0.0 - 2.0 mg/dL    Nitrite Urine Negative  NEG    Leukocyte Esterase Urine Negative   NEG    Source Midstream Urine      WBC Urine 1  0 - 2 /HPF    RBC Urine 4 (*) 0 - 2 /HPF    Squamous Epithelial /HPF Urine <1  0 - 1 /HPF    Mucous Urine Present (*) NEG /LPF    Hyaline Casts 3 (*) 0 - 2 /LPF    Calcium Oxalate Moderate (*) NEG /HPF   COMPLEMENT C3       Component Value Range    Complement C3 143  76 - 169 mg/dL   COMPLEMENT C4       Component Value Range    Complement C4 31  15 - 50 mg/dL   HEPATITIS C ANTIBODY       Component Value Range    Hepatitis C Antibody Negative  NEG   CARDIOLIPIN ANTIBODY IGG AND IGM       Component Value Range    Cardiolipin IgG Marline    0 - 15.0 GPL    Value: <15.0      Interpretation:  Negative    Cardiolipin IgM Marline    0 - 12.5 MPL    Value: <12.5      Interpretation:  Negative   LUPUS PANEL       Component Value Range    Lupus Result    NEG    Value: Negative      (Note)      COMMENTS:      The INR is normal.      APTT is normal.  1:2 Mix is not indicated.      DRVVT Screen is normal.      Thrombin time is normal.      NEGATIVE TEST; A LUPUS ANTICOAGULANT WAS NOT DETECTED IN THIS      SPECIMEN WITHIN THE LIMITS OF THE TESTING REPERTOIRE.      If the clinical picture is strongly suggestive of an antiphospholipid      syndrome, recommend anticardiolipin and beta-2-glycoprotein (IgG and      IgM) antibody tests.      Leela Franks M.D.  [phone]      5/2/2013            INR =  0.93 N = 0.86-1.14  (No additional charge)      TT = 15.7 N = 13.0-19.0 sec  (No additional charge)            APTT'S:    Seconds      Reagent =  Stago LA      Normal  =  38      Patient  =  34      1:2 Mix  =  N/A      Reference =  31-43             DILUTE MADELINE VIPER VENOM TEST:      DRVVT Screen Ratio = 0.76 Normal = <1.21         IMMUNOGLOBULIN G SUBCLASSES       Component Value Range    IGG 1030  695 - 1620 mg/dL    IgG1 488  300 - 856 mg/dL    IgG2 325  158 - 761 mg/dL    IgG3 47  24 - 192 mg/dL    IgG4 18  11 - 86 mg/dL   SUNNY ANTIBODY PANEL       Component Value Range     Ribonucleic Protein IgG Antibody 0      Smith Antibody IgG 1      SSA (RO) Antibody IgG 4      SSB (LA) Antibody IgG 0      Scleroderma Antibody IgG 0     BETA 2 GLYCOPROTEIN ANTIBODIES IGG IGM       Component Value Range    Beta-2-Glycopro IgG 1      Beta-2-Glycopro IgM 5     CYCLIC CIT PEPT IGG       Component Value Range    Cyclic Cit Pept IgG/IgA    <20 UNITS    Value: <20      Interpretation:  Negative   DNA DOUBLE STRANDED ANTIBODIES       Component Value Range    DNA-ds    0 - 29 IU/mL    Value: <15      Interpretation:  Negative       CT CHEST PULMONARY EMBOLISM W CONTRAST 5/20/2015 4:57 PM  HISTORY: Pain. SOB. Elevated d-dimer.   TECHNIQUE: 65 mL Isovue 370. Axial images with coronal  reconstructions.  COMPARISON: None.  FINDINGS: Calcified granuloma with surrounding scarring in the  posterolateral left upper lobe. Triangular shaped opacity at the right  lung base in the lateral right middle lobe could represent some  scarring, atelectasis or infiltrate. There is also some scarring or  atelectasis in the posteromedial right lung base. Lungs otherwise  clear.  The pulmonary arteries are well opacified. No CT evidence for acute  pulmonary embolus. No aortic dissection.  Diffuse fatty infiltration of the liver.  IMPRESSION  IMPRESSION:   1. No pulmonary embolus identified.  2. Small focus of atelectasis, infiltrate or scarring in the lateral  right middle lobe.  3. Old granulomatous disease.  4. Otherwise negative.  SILVERIO VAZQUEZ MD    Copath Report      Patient Name: FAVIO MARTINEZ   MR#: [id number]   Specimen #: [id number]   Collected: 3/15/2016   Received: 3/15/2016   Reported: 3/17/2016 13:33   Ordering Phy(s): PARVEEN ENRIQUEZ     ORIGINAL REPORT FOLLOWS ADDENDUM  ADDENDUM     TO ORIGINAL REPORT   Status: Signed Out   Date Ordered:3/18/2016   Date Complete:3/18/2016   Date Reported:3/18/2016 12:06   Signed Out By: Marianne Godfrey MD     INTERPRETATION:   This addendum is done to incorporate the  "results of fungal (GMS) stains   done on the specimen.  Specimen is negative for fungal organisms.  The   original final diagnosis remains unchanged.     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  Negative for malignancy(Please   see microscopic description)     Electronically signed out by:     Marianne Godfrey MD     CLINICAL HISTORY:   right orbital mass     GROSS:   The specimen is received labeled \"right orbital biopsy\" and consists of   red-tan nodule measuring 1.5 x 0.9 x 0.6 cm with one smooth side and   opposite rough side consistent with periosteum.  The specimen is   bisected revealing homogenous pale tan fleshy cut surface.  Touch   preparations are prepared, air dried and fixed, portion of specimen is   submitted in RPMI for possible lymphoma workup [facility],   ([employer], [place], [place] ).  The remainder is entirely submitted.   (Dictated by: Marianne Godfrey MD 3/15/2016 04:45 PM)     MICROSCOPIC:     Specimen consists of portion of lacrimal gland with acute and chronic   inflammation.  Focal area of microabscess formation associated with   small areas of necrosis are also present.  Inflammation is seen   extending to the periorbital adipose tissue forming panniculitis.   Specimen is negative for malignancy.  Samples sent for immunophenotyping   to "[facility], ([employer], [place], [place] ) reveals no evidence   of monoclonality or aberrant antigen expression.  A GMS (fungal) stain   is pending and results will be reported as an addendum.     CPT Codes:   A: 09016-IU2, 50422-RVDLF, SOH, 73016-JBVAO, 30527-XKWU     TESTING LAB LOCATION:   [address]  [address] [phone]     COLLECTION SITE:   Client: [facility] " [facility]   Location: [facility] (S)            Component      Latest Ref Rng 4/29/2016   Nucleated RBCs      0 /100 0   Absolute Neutrophil      1.6 - 8.3 10e9/L 8.9 (H)   Absolute Lymphocytes      0.8 - 5.3 10e9/L 3.2   Absolute Monocytes      0.0 - 1.3 10e9/L 0.8   Absolute Eosinophils      0.0 - 0.7 10e9/L 0.2   Absolute Basophils      0.0 - 0.2 10e9/L 0.1   Abs Immature Granulocytes      0 - 0.4 10e9/L 0.1   Absolute Nucleated RBC       0.0   IGG      695 - 1620 mg/dL 836   IgG1      300 - 856 mg/dL 280 (L)   IgG2      158 - 761 mg/dL 277   IgG3      24 - 192 mg/dL 35   IgG4      11 - 86 mg/dL 16   RNP Antibody IgG      0.0 - 0.9 AI <0.2 . . .   Smith SUNNY Antibody IgG      0.0 - 0.9 AI <0.2 . . .   SSA (Ro) (SUNNY) Antibody, IgG      0.0 - 0.9 AI <0.2 . . .   SSB (La) (SUNNY) Antibody, IgG      0.0 - 0.9 AI <0.2 . . .   Scleroderma Antibody Scl-70 SUNNY IgG      0.0 - 0.9 AI <0.2 . . .   Cholesterol      <200 mg/dL 154   Triglycerides      <150 mg/dL 220 (H)   HDL Cholesterol      >49 mg/dL 42 (L)   LDL Cholesterol Calculated      <100 mg/dL 67   Non HDL Cholesterol      <130 mg/dL 111   M Tuberculosis Result      NEG Negative   M Tuberculosis Antigen Value       0.00   Albumin      3.4 - 5.0 g/dL 4.3   ALT      0 - 50 U/L 30   AST      0 - 45 U/L 10   Complement C3      76 - 169 mg/dL 157   Complement C4      15 - 50 mg/dL 32   CRP Inflammation      0.0 - 8.0 mg/L <2.9   Sed Rate      0 - 20 mm/h 2   DNA-ds      <10 IU/mL 1   Cyclic Citrullinated Peptide Antibody, IgG      <7 U/mL 1   Rheumatoid Factor      <20 IU/mL <20   Proteinase 3 Antibody IgG      0.0 - 0.9 AI <0.2 . . .   Myeloperoxidase Antibody IgG      0.0 - 0.9 AI 2.5 (H)   Vitamin D Deficiency screening      20 - 75 ug/L 24   Hemoglobin A1C      4.3 - 6.0 % 7.9 (H)   ALLI by IFA IgG       1:40 (A) . . .     Component      Latest Ref Rng & Units 1/16/2021   WBC      4.0 - 11.0 10e9/L    RBC Count      3.8 - 5.2 10e12/L    Hemoglobin      11.7 -  15.7 g/dL    Hematocrit      35.0 - 47.0 %    MCV      78 - 100 fl    MCH      26.5 - 33.0 pg    MCHC      31.5 - 36.5 g/dL    RDW      10.0 - 15.0 %    Platelet Count      150 - 450 10e9/L    Diff Method          % Neutrophils      %    % Lymphocytes      %    % Monocytes      %    % Eosinophils      %    % Basophils      %    % Immature Granulocytes      %    Nucleated RBCs      0 /100    Absolute Neutrophil      1.6 - 8.3 10e9/L    Absolute Lymphocytes      0.8 - 5.3 10e9/L    Absolute Monocytes      0.0 - 1.3 10e9/L    Absolute Eosinophils      0.0 - 0.7 10e9/L    Absolute Basophils      0.0 - 0.2 10e9/L    Abs Immature Granulocytes      0 - 0.4 10e9/L    Absolute Nucleated RBC          IGG      610 - 1,616 mg/dL    IGA      84 - 499 mg/dL    IGM      35 - 242 mg/dL    Creatinine      0.52 - 1.04 mg/dL    GFR Estimate      >60 mL/min/1.73:m2    GFR Estimate If Black      >60 mL/min/1.73:m2    COVID-19 Virus PCR to [facility] - Source       Nasopharyngeal   COVID-19 Virus PCR to [facility] - Result       Not Detected   Neutrophil Cytoplasmic Antibody      <1:10 titer    Neutrophil Cytoplasmic Antibody Pattern          CD19 B Cells      6 - 27 %    Absolute CD19      107 - 698 cells/uL    Myeloperoxidase Antibody IgG      0.0 - 0.9 AI    Proteinase 3 Antibody IgG      0.0 - 0.9 AI    Vitamin D Deficiency screening      20 - 75 ug/L    Sed Rate      0 - 30 mm/h    CRP Inflammation      0.0 - 8.0 mg/L    Albumin      3.4 - 5.0 g/dL    ALT      0 - 50 U/L    AST      0 - 45 U/L      Component      Latest Ref Rng & Units 5/7/2021   WBC      4.0 - 11.0 10e9/L 8.9   RBC Count      3.8 - 5.2 10e12/L 4.89   Hemoglobin      11.7 - 15.7 g/dL 12.7   Hematocrit      35.0 - 47.0 % 39.7   MCV      78 - 100 fl 81   MCH      26.5 - 33.0 pg 26.0 (L)   MCHC      31.5 - 36.5 g/dL 32.0   RDW      10.0 - 15.0 % 13.7   Platelet Count      150 - 450 10e9/L 336   Diff Method       Automated Method   % Neutrophils      % 65.3   % Lymphocytes       % 20.7   % Monocytes      % 7.8   % Eosinophils      % 5.3   % Basophils      % 0.7   % Immature Granulocytes      % 0.2   Nucleated RBCs      0 /100 0   Absolute Neutrophil      1.6 - 8.3 10e9/L 5.8   Absolute Lymphocytes      0.8 - 5.3 10e9/L 1.8   Absolute Monocytes      0.0 - 1.3 10e9/L 0.7   Absolute Eosinophils      0.0 - 0.7 10e9/L 0.5   Absolute Basophils      0.0 - 0.2 10e9/L 0.1   Abs Immature Granulocytes      0 - 0.4 10e9/L 0.0   Absolute Nucleated RBC       0.0   IGG      610 - 1,616 mg/dL 649   IGA      84 - 499 mg/dL 199   IGM      35 - 242 mg/dL 36   Creatinine      0.52 - 1.04 mg/dL 0.96   GFR Estimate      >60 mL/min/1.73:m2 66   GFR Estimate If Black      >60 mL/min/1.73:m2 77   COVID-19 Virus PCR to [facility] - Source          COVID-19 Virus PCR to [facility] - Result          Neutrophil Cytoplasmic Antibody      <1:10 titer <1:10   Neutrophil Cytoplasmic Antibody Pattern       The ANCA IFA is <1:10.  No further testing will be performed.   CD19 B Cells      6 - 27 % <1 (L)   Absolute CD19      107 - 698 cells/uL <1 (L)   Myeloperoxidase Antibody IgG      0.0 - 0.9 AI 1.1 (H)   Proteinase 3 Antibody IgG      0.0 - 0.9 AI <0.2   Vitamin D Deficiency screening      20 - 75 ug/L 41   Sed Rate      0 - 30 mm/h 9   CRP Inflammation      0.0 - 8.0 mg/L <2.9   Albumin      3.4 - 5.0 g/dL 4.1   ALT      0 - 50 U/L 28   AST      0 - 45 U/L 18     ROS: A comprehensive ROS was done. Positives are per HPI.          HISTORY REVIEW:  Past Medical History:   Diagnosis Date     Abnormal glandular Papanicolaou smear of cervix 1992     Allergic rhinitis     Allergy, airborne subst     Arthritis      ASCVD (arteriosclerotic cardiovascular disease)      Chronic pain      Chronic pancreatitis (H)     idiopathic, Tx: PPI, antioxidants, pancreatic enzymes     Common migraine      Coronary artery disease      Costochondritis      Difficulty in walking(719.7)      Dyspnea on exertion      Ectasia, mammary duct     [facility]     Elevated fasting glucose      Gastro-oesophageal reflux disease      Granulomatosis with polyangiitis (H)      Hernia      History of angina      Hyperlipidemia LDL goal < 100      Hypertension goal BP (blood pressure) < 140/90     Essential hypertension     Iron deficiency anemia      Ischemic cardiomyopathy      Menorrhagia      Migraine headaches      Mild persistent asthma      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 Matias, neg w/u, Rx'd w/prednisone     Ventral hernia, unspecified, without mention of obstruction or gangrene 2012     Past Surgical History:   Procedure Laterality Date     C/SECTION, LOW TRANSVERSE           CARDIAC SURGERY      cardiac stent- recent in 16; 4 other stents     DILATION AND CURETTAGE N/A 2016    Procedure: DILATION AND CURETTAGE;  Surgeon: Nahed Butler MD;  Location: [facility]     ESOPHAGOSCOPY, GASTROSCOPY, DUODENOSCOPY (EGD), COMBINED N/A 3/31/2022    Procedure: ESOPHAGOGASTRODUODENOSCOPY, WITH BIOPSY;  Surgeon: Enzo Sesay MD;  [facility]     [facility] UGI ENDOSCOPY W EUS  08    Dr. Pastrana, possible chronic pancreatitis, fatty liver     [place] [facility] [place] INTRAUTERINE DEVICE N/A 2016    Procedure: INSERT  INTRAUTERINE DEVICE;  Surgeon: Nahed Butler MD;  Location: [facility]     INT UTERINE FIBRIOD EMBOLIZATION  10/29/2014     LAPAROSCOPIC CHOLECYSTECTOMY  08    Dr. Arnol GRUBBS TX, CERVICAL      s/p LEEP     ORBITOTOMY Right 3/15/2016    Procedure: ORBITOTOMY;  Surgeon: Myron Cyr MD;  [facility]     ORBITOTOMY Right 2017    Procedure: ORBITOTOMY;  RIGHT ORBITOTOMY AND BIOPSY;  Surgeon: Charis Holbrook MD;  Location:  [facility]     REPAIR PTOSIS Right 2017    Procedure: REPAIR PTOSIS;  RIGHT UPPER LID PTOSIS REPAIR;  Surgeon: Myron Cyr MD;  Location:  [facility]     UPPER GI ENDOSCOPY  10/21/08    mild gastritis, Dr. Rocky [facility] ECHO HEART XTHORACIC,COMPLETE, W/O DOPPLER  04    [facility] WNL, EF 60%     Family History   Problem Relation Age of Onset     Heart Disease Father 50        heart attack     Cerebrovascular Disease Father      Diabetes Father      Hypertension Father      Depression Father      C.A.D. Father      Hypertension Mother      Arthritis Mother      Heart Disease Mother         long qt syndrome     Thyroid Disease Mother      C.A.D. Mother      Heart Disease Brother 15        MI at 15, 16.      Diabetes Maternal Uncle      Hypertension Maternal Aunt      Hypertension Maternal Uncle      Arthritis Brother         he passed away, had severe arthritis at age 11     Arthritis Maternal Uncle      Eye Disorder Maternal Uncle         cataracts     Neurologic Disorder Sister         migraines     Neurologic Disorder Sister         migraines     Respiratory Son         asthma     Cerebrovascular Disease Maternal Uncle      C.A.D. Brother      Family History Negative Other         neg for RA, SLE     Unknown/Adopted No family hx of         unknown neurological issues in her family, mother was adopted     Skin Cancer No family hx of         No known family hx of skin cancer     Social History     Socioeconomic History     Marital status: Single      Spouse name: Not on file     Number of children: 1     Years of education: Not on file     Highest education level: Not on file   Occupational History     Employer: NONE    Tobacco Use     Smoking status: Current Some Day Smoker     Packs/day: 0.20     Years: 1.00     Pack years: 0.20     Types: Cigarettes     Last attempt to quit: 2016     Years since quittin.2     Smokeless tobacco: Never Used   Substance and Sexual Activity     Alcohol use: No     Alcohol/week: 0.0 standard drinks     Drug use: No     Sexual activity: Not Currently   Other Topics Concern     Parent/sibling w/ CABG, MI or angioplasty before 65F 55M? Yes   Social History Narrative    Balanced Diet - sometimes    Osteoporosis Prevention Measures - Dairy servings per day: 2 servings weekly    Regular Exercise -  Yes Describe walking 4 times a week    Dental Exam - NO    Seatbelts used - Yes    Self Breast Exam - Yes    Abuse: Current or Past (Physical, Sexual or Emotional)- No    Do you have any concerns about STD's -  No    Do you feel safe in your environment - No    Guns stored in the home - No    Sunscreen used - Yes    Lipids -  YES - Date:     Glucose -  YES - Date:     Eye Exam - YES - Date: one year ago    Colon Cancer Screening - No    Hemoccults - NO    Pap Test -  YES - Date: , remote history of LEEP    Mammography - YES - Date: last spring, would like to discuss, needs a referral to [facility] center    DEXA - NO    Immunizations reviewed and up to date - Yes, last td given in  or      Social Determinants of Health     Financial Resource Strain: Not on file   Food Insecurity: Not on file   Transportation Needs: Not on file   Physical Activity: Not on file   Stress: Not on file   Social Connections: Not on file   Intimate Partner Violence: Not on file   Housing Stability: Not on file     Patient Active Problem List   Diagnosis     Seasonal affective disorder (H)     Allergic rhinitis     PCOS  (polycystic ovarian syndrome)     Moderate persistent asthma     Chronic pancreatitis (H)     Hypertension goal BP (blood pressure) < 140/90     Common migraine     NSTEMI (non-ST elevated myocardial infarction) (H)     Hyperlipidemia LDL goal <70     ASCVD (arteriosclerotic cardiovascular disease)     GERD (gastroesophageal reflux disease)     Ischemic cardiomyopathy     Hypertensive heart disease     Uterine leiomyoma     Iron deficiency anemia     Costochondritis     Vitamin D deficiency     Breast cancer screening     Spinal stenosis in cervical region     Fibromyalgia     Seronegative rheumatoid arthritis (H)     Type 2 diabetes, HbA1C goal < 8% (H)     Type 2 diabetes mellitus with other specified complication (H)     Hyperlipidemia associated with type 2 diabetes mellitus (H)     Hypertension associated with diabetes (H)     Overweight with body mass index (BMI) 25.0-29.9     Tobacco use disorder     Telogen effluvium     CAD S/P percutaneous coronary angioplasty     Status post coronary angiogram     ANCA-associated vasculitis (H)     Wegener's vasculitis     Type 1 diabetes mellitus with complications (H)     Chest discomfort     Urinary frequency     Abdominal pain, epigastric     Hypokalemia     Leukocytosis, unspecified type     Acute pancreatitis, unspecified complication status, unspecified pancreatitis type       Pregnancy Hx: She is . All misscarriages were in first trimester. H/o OC use in the past. 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    PMHx, FHx, SHx were reviewed, unchanged.      Outpatient Encounter Medications as of 2022   Medication Sig Dispense Refill     acetaminophen (TYLENOL) 325 MG tablet Take 1-2 tablets (325-650 mg) by mouth every 6 hours as needed for pain (headache) 250 tablet 4     albuterol (2.5 MG/3ML) 0.083% neb solution INL 1 VIAL VIA NEBULIZATION Q 4 TO 6 HOURS PRN  1     albuterol (PROAIR HFA, PROVENTIL HFA, VENTOLIN HFA) 108 (90 BASE) MCG/ACT  inhaler Inhale 2 puffs into the lungs every 6 hours as needed for shortness of breath / dyspnea or wheezing 3 Inhaler 1     aspirin (ASPIRIN LOW DOSE) 81 MG EC tablet Take 1 tablet (81 mg) by mouth daily . 30-day refills only. 30 tablet 11     blood glucose (NO BRAND SPECIFIED) lancets standard Use to test blood sugar 1-4 times daily or as directed. 400 each 3     blood glucose (NO BRAND SPECIFIED) test strip Use to test blood sugar 1-4 times daily or as directed. 400 strip 3     blood glucose monitoring (NO BRAND SPECIFIED) meter device kit Use to test blood sugar 4 X times daily or as directed. (Patient taking differently: 1 kit Use to test blood sugar 4 X times daily or as directed.) 1 kit 0     blood glucose monitoring (NO BRAND SPECIFIED) test strip 1 strip by In Vitro route 4 times daily Test as directed. Please dispense three months and three months of refills. Thank you. (Patient taking differently: 1 strip by In Vitro route 4 times daily Test as directed. Please dispense three months and three months of refills. Thank you.) 360 each 3     Blood Pressure Monitor KIT 1 each daily Monitor home blood pressure as instructed by physician.  Dispense [place] blood pressure kit. 1 kit 0     calcium carbonate (TUMS) 500 MG chewable tablet Take 3-4 tablets (1,500-2,000 mg) by mouth daily as needed 90 tablet 3     clopidogrel (PLAVIX) 75 MG tablet Take 1 tablet (75 mg) by mouth daily . 30-day refills only. 30 tablet 11     COMPRESSION STOCKINGS 2 each daily Apply one 10-15 mmHg compression stocking to each lower extgmierty during the day and remove before bedtime. 4 each 2     cyclobenzaprine (FLEXERIL) 10 MG tablet Take 1 tablet (10 mg) by mouth 2 times daily as needed for muscle spasms 60 tablet 3     diphenhydrAMINE (BENADRYL) 25 MG capsule Take 1 capsule (25 mg) by mouth nightly as needed for itching or allergies 30 capsule 2     EPIPEN 2-RIKY 0.3 MG/0.3ML injection INJECT 0.3 MG INTO THE MUSCLE PRF ANAPHYALAXIS  0      esomeprazole (NEXIUM) 40 MG DR capsule Take 1 capsule (40 mg) by mouth every morning (before breakfast) Take 30-60 minutes before eating.  Further prescriptions to be filled by primary care provider. 90 capsule 0     famotidine (PEPCID) 20 MG tablet Take 1 tablet (20 mg) by mouth 2 times daily as needed (abdominal pain) 20 tablet 0     fexofenadine (ALLEGRA) 180 MG tablet Take 1 tablet by mouth daily as needed. 90 tablet 3     fluocinonide (LIDEX) 0.05 % external ointment Apply topically as needed        folic acid (FOLVITE) 1 MG tablet Take 1 tablet (1 mg) by mouth daily 90 tablet 2     insulin glargine (LANTUS PEN) 100 UNIT/ML pen Inject 68 Units Subcutaneous every morning Or as directed. 75 mL 3     insulin pen needle (BD MARCK U/F) 32G X 4 MM miscellaneous USE DAILY OR AS DIRECTED 300 each 3     lisinopril-hydrochlorothiazide (ZESTORETIC) 20-25 MG tablet Take 1 tablet by mouth daily . 30-day refills only. 30 tablet 11     magnesium 250 MG tablet TAKE 1 TABLET(250 MG) BY MOUTH TWICE DAILY 60 tablet 1     metFORMIN (GLUCOPHAGE-XR) 500 MG 24 hr tablet Take 4 tablets (2,000 mg) by mouth daily (with dinner) 120 tablet 11     metoprolol succinate ER (TOPROL-XL) 100 MG 24 hr tablet Take 1 tablet (100 mg) by mouth daily . 30-day refills only. 30 tablet 11     Multiple Vitamin (DAILY-GERALD) TABS Take 1 tablet by mouth daily 90 tablet 0     nitroGLYcerin (NITROSTAT) 0.4 MG sublingual tablet Place 1 tablet (0.4 mg) under the tongue every 5 minutes as needed for chest pain . After 3 doses, if pain persists call 911. 25 tablet 3     nystatin (MYCOSTATIN) 408138 UNIT/ML suspension Take 5 mLs (500,000 Units) by mouth 4 times daily 200 mL 0     ondansetron (ZOFRAN-ODT) 4 MG ODT tab Take 1 tablet (4 mg) by mouth every 8 hours as needed for nausea 12 tablet 0     ondansetron (ZOFRAN-ODT) 8 MG ODT tab Take 1 tablet (8 mg) by mouth every 8 hours as needed for nausea 20 tablet 1     pravastatin (PRAVACHOL) 40 MG tablet Take 1  tablet (40 mg) by mouth daily . 30-day refills only. 30 tablet 11     sennosides (SENOKOT) 8.6 MG tablet 1-2 tabs a day as needed for constipation 60 tablet 1     spironolactone (ALDACTONE) 25 MG tablet Take 1 tablet (25 mg) by mouth daily . Take one additional 0.5 tab daily as needed for weight gain. 45 day refills. 45 tablet 11     sucralfate (CARAFATE) 1 GM/10ML suspension Take 10 mLs (1 g) by mouth 4 times daily 1200 mL 1     SYMBICORT 80-4.5 MCG/ACT Inhaler        terbinafine (LAMISIL) 1 % external cream Apply topically 2 times daily 42 g 1     traMADol (ULTRAM) 50 MG tablet TAKE 1 TABLET(50 MG) BY MOUTH EVERY 8 HOURS AS NEEDED FOR MODERATE PAIN 60 tablet 3     vitamin D2 (ERGOCALCIFEROL) 74516 units (1250 mcg) capsule TAKE 1 CAPSULE BY MOUTH 1 TIME A WEEK 12 capsule 0     dicyclomine (BENTYL) 20 MG tablet TAKE 1 TABLET(20 MG) BY MOUTH FOUR TIMES DAILY AS NEEDED. Pls schedule clinic appt for refills. (Patient not taking: No sig reported) 60 tablet 0     Ergocalciferol (VITAMIN D2) 50 MCG (2000 UT) TABS Take 2,000 Units by mouth daily (Patient not taking: No sig reported) 90 tablet 1     fluocinolone (SYNALAR) 0.01 % solution Apply topically daily as needed  (Patient not taking: No sig reported)       fluticasone-vilanterol (BREO ELLIPTA) 200-25 MCG/INH inhaler Inhale 1 puff into the lungs daily  (Patient not taking: No sig reported)       ketoconazole (NIZORAL) 2 % shampoo Apply topically daily as needed  (Patient not taking: No sig reported)       montelukast (SINGULAIR) 10 MG tablet Take 1 tablet (10 mg) by mouth At Bedtime (Patient not taking: No sig reported) 30 tablet 1     polyethylene glycol (MIRALAX) 17 GM/Dose powder Take 17 g (1 capful) by mouth daily (Patient not taking: No sig reported) 507 g 1     terconazole (TERAZOL 3) 80 MG vaginal suppository Place 1 suppository (80 mg) vaginally At Bedtime (Patient not taking: No sig reported) 3 suppository 1     vitamin D3 (CHOLECALCIFEROL) 50 mcg (2000  units) tablet Take 1 tablet (50 mcg) by mouth daily (Patient not taking: No sig reported) 90 tablet 1     No facility-administered encounter medications on file as of 4/22/2022.       Allergies   Allergen Reactions     Amoxicillin-Pot Clavulanate      Augmentin      Unknown possible hives and edema     Azithromycin      Diatrizoate Other (See Comments)     Pt wants this listed because she is allergic to shellfish      Imitrex [Sumatriptan]      Severe face/neck/chest tightness and flushing side effects      Penicillins Hives     Unknown      Pork Allergy      Stomach pain, cramping, diarrhea, itching, nausea and headaches     Shellfish Allergy Hives and Swelling     Sulfa Drugs Hives and Swelling     Zithromax [Azithromycin Dihydrate] Swelling     Unknown          Ph.E:    /81   Pulse 74   Temp 98.4  F (36.9  C)   Resp 16   Wt 80.3 kg (177 lb)   SpO2 99%   BMI 31.35 kg/m    GA: NAD, pleasant  HEENT: nl conj, sclera, EOMI. No campos-orbital edema. +oral thrush  Chest: CTAB  CV: no M/R/G, RRR  Abdomen: soft, NT  MSK: no active synovitis or joint tenderness  Skin: no rash  Neuro: non focal  Psych: nl affect        Assessment/ plan:    1-Seropositive non-erosive RA (arthritis, AM stiffness, high CRP, RF 26 but re-check neg 3/2015 on HCQ) Dx 5/2013, FMS, new pleuritic CP 3/20-15-5/2015 resolved on steroids. She is on  mg bid since 5/2014. She tolerates it well and it's helping some but not enough to control all her pain. Continues having flare ups of joint pain, could be GPA related. Some pain is due to FMS.    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 Prednisone was not a good option for her given weight gain, diabetes. She tried and did not tolerate MTX, AZA.    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 Repeat MPO  was positive in 6/2017.    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 Shah and Dr. Valdez at [place]. 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     She is on rituximab since 12/12/2018 with great response, minor allergic reaction which could be managed by pre-meds and extra steroid dose.      Stable GPA but rituximab does not last 6 months. Had lots of joint pain before last rituximab, repeat orbit CT showed improvement. According to ACR guideline could give every 4-6 months. Stable labs.     Had last rituximab 1 gram one dose only on 4/6/2022. GPA responds to rituximab maintenance tx; however had high BG followed by vaginal yeast infection+thrush after solumedrol pre-med, will treat candida with fluconazole and will reduce solumedrol dose in future.    Recommended evusheld given b cell depletion tx as rituximab blocks the response to covid vaccine, she is concerned about side effects. I advised her to discuss with [facility] pharmacist.    My impression is that her arthralgias are a combination of RA/ IA sec GPA, fibromyalgia and chronic pain.     2-Fad pads. She was seen by endocrinology and cushing was ruled out in 4/2014. Was advised to do f/u for enlarged thyroid/thyroid nodules.    3-Hair loss. F/U with dermatology    4-Chronic pain. F/U with pain clinic    5-Vit D deficiency. Was replaced with vit D 50, 000 units po qwk x 12 wk then 2000 units every day    6-Discussed COVID vaccination, timing with rituximab to get max benefit, she had severe sore arm.    7-?HCQ toxicity on OCT 7/2021, now off HCQ, could explain increased arthralgia      Today's plan:    Labs on 5/4/2022    Plan for repeat rituximab 1000 mg one dose in early October with less steroid (solumedrol 80 mg IV)    Fluconazole 200 mg once daily for 8 to 14 days     Try nizoral shampoo    Derm referral    Follow up with Dr. Vernon    [facility] follow up    Return in Aug (in person)      Majo Mckinnon MD

## 2022-04-22 NOTE — NURSING NOTE
Chief Complaint   Patient presents with     Consult     Follow Up     /81   Pulse 74   Temp 98.4  F (36.9  C)   Resp 16   Wt 80.3 kg (177 lb)   SpO2 99%   BMI 31.35 kg/m    Hema Flores on 4/22/2022 at 1:34 PM

## 2022-04-25 ENCOUNTER — VIRTUAL VISIT (OUTPATIENT)
Dept: PHARMACY | Facility: CLINIC | Age: 57
End: 2022-04-25
Payer: COMMERCIAL

## 2022-04-25 DIAGNOSIS — Z53.9 ERRONEOUS ENCOUNTER--DISREGARD: Primary | ICD-10-CM

## 2022-05-14 ENCOUNTER — LAB (OUTPATIENT)
Dept: LAB | Facility: CLINIC | Age: 57
End: 2022-05-14
Payer: COMMERCIAL

## 2022-05-14 ENCOUNTER — OFFICE VISIT (OUTPATIENT)
Dept: FAMILY MEDICINE | Facility: CLINIC | Age: 57
End: 2022-05-14

## 2022-05-14 VITALS
DIASTOLIC BLOOD PRESSURE: 86 MMHG | SYSTOLIC BLOOD PRESSURE: 125 MMHG | WEIGHT: 171.8 LBS | OXYGEN SATURATION: 99 % | BODY MASS INDEX: 30.44 KG/M2 | TEMPERATURE: 98.3 F | HEART RATE: 71 BPM | HEIGHT: 63 IN | RESPIRATION RATE: 12 BRPM

## 2022-05-14 DIAGNOSIS — D72.829 LEUKOCYTOSIS, UNSPECIFIED TYPE: ICD-10-CM

## 2022-05-14 DIAGNOSIS — E11.9 TYPE 2 DIABETES, HBA1C GOAL < 8% (H): Primary | ICD-10-CM

## 2022-05-14 DIAGNOSIS — B37.0 THRUSH: ICD-10-CM

## 2022-05-14 DIAGNOSIS — B37.31 VAGINAL YEAST INFECTION: ICD-10-CM

## 2022-05-14 DIAGNOSIS — R31.9 HEMATURIA, UNSPECIFIED TYPE: ICD-10-CM

## 2022-05-14 DIAGNOSIS — K26.9 DUODENAL ULCER WITHOUT HEMORRHAGE OR PERFORATION AND WITHOUT OBSTRUCTION: ICD-10-CM

## 2022-05-14 DIAGNOSIS — K26.3 DUODENAL ULCER, ACUTE: ICD-10-CM

## 2022-05-14 DIAGNOSIS — I77.82 ANCA-ASSOCIATED VASCULITIS (H): ICD-10-CM

## 2022-05-14 DIAGNOSIS — E11.9 TYPE 2 DIABETES, HBA1C GOAL < 8% (H): ICD-10-CM

## 2022-05-14 DIAGNOSIS — E11.9 TYPE 2 DIABETES, HBA1C GOAL < 7% (H): ICD-10-CM

## 2022-05-14 DIAGNOSIS — F41.9 ANXIETY: ICD-10-CM

## 2022-05-14 DIAGNOSIS — Z51.81 ENCOUNTER FOR MEDICATION MONITORING: ICD-10-CM

## 2022-05-14 DIAGNOSIS — G47.10 HYPERSOMNOLENCE: ICD-10-CM

## 2022-05-14 LAB
ALBUMIN SERPL-MCNC: 4.1 G/DL (ref 3.4–5)
ALBUMIN SERPL-MCNC: 4.1 G/DL (ref 3.4–5)
ALP SERPL-CCNC: 92 U/L (ref 40–150)
ALT SERPL W P-5'-P-CCNC: 37 U/L (ref 0–50)
ALT SERPL W P-5'-P-CCNC: 38 U/L (ref 0–50)
ANION GAP SERPL CALCULATED.3IONS-SCNC: 10 MMOL/L (ref 3–14)
AST SERPL W P-5'-P-CCNC: 22 U/L (ref 0–45)
AST SERPL W P-5'-P-CCNC: 22 U/L (ref 0–45)
BASOPHILS # BLD AUTO: 0.1 10E3/UL (ref 0–0.2)
BASOPHILS NFR BLD AUTO: 1 %
BILIRUB SERPL-MCNC: 0.3 MG/DL (ref 0.2–1.3)
BUN SERPL-MCNC: 23 MG/DL (ref 7–30)
CALCIUM SERPL-MCNC: 9.8 MG/DL (ref 8.5–10.1)
CD19 CELLS # BLD: 0 CELLS/UL (ref 107–698)
CD19 CELLS NFR BLD: <1 % (ref 6–27)
CHLORIDE BLD-SCNC: 100 MMOL/L (ref 94–109)
CO2 SERPL-SCNC: 26 MMOL/L (ref 20–32)
CREAT SERPL-MCNC: 1.07 MG/DL (ref 0.52–1.04)
CREAT SERPL-MCNC: 1.07 MG/DL (ref 0.52–1.04)
CRP SERPL-MCNC: 3.8 MG/L (ref 0–8)
EOSINOPHIL # BLD AUTO: 0.4 10E3/UL (ref 0–0.7)
EOSINOPHIL NFR BLD AUTO: 3 %
ERYTHROCYTE [DISTWIDTH] IN BLOOD BY AUTOMATED COUNT: 13.8 % (ref 10–15)
ERYTHROCYTE [SEDIMENTATION RATE] IN BLOOD BY WESTERGREN METHOD: 10 MM/HR (ref 0–30)
FERRITIN SERPL-MCNC: 76 NG/ML (ref 8–252)
GFR SERPL CREATININE-BSD FRML MDRD: 61 ML/MIN/1.73M2
GFR SERPL CREATININE-BSD FRML MDRD: 61 ML/MIN/1.73M2
GLUCOSE BLD-MCNC: 211 MG/DL (ref 70–99)
HBA1C MFR BLD: 11.2 % (ref 0–5.6)
HCT VFR BLD AUTO: 42.5 % (ref 35–47)
HGB BLD-MCNC: 13.7 G/DL (ref 11.7–15.7)
IMM GRANULOCYTES # BLD: 0 10E3/UL
IMM GRANULOCYTES NFR BLD: 0 %
IRON SATN MFR SERPL: 16 % (ref 15–46)
IRON SERPL-MCNC: 56 UG/DL (ref 35–180)
LYMPHOCYTES # BLD AUTO: 3.6 10E3/UL (ref 0.8–5.3)
LYMPHOCYTES NFR BLD AUTO: 26 %
MCH RBC QN AUTO: 25.5 PG (ref 26.5–33)
MCHC RBC AUTO-ENTMCNC: 32.2 G/DL (ref 31.5–36.5)
MCV RBC AUTO: 79 FL (ref 78–100)
MONOCYTES # BLD AUTO: 1 10E3/UL (ref 0–1.3)
MONOCYTES NFR BLD AUTO: 8 %
NEUTROPHILS # BLD AUTO: 8.7 10E3/UL (ref 1.6–8.3)
NEUTROPHILS NFR BLD AUTO: 62 %
NRBC # BLD AUTO: 0 10E3/UL
NRBC BLD AUTO-RTO: 0 /100
PLATELET # BLD AUTO: 401 10E3/UL (ref 150–450)
POTASSIUM BLD-SCNC: 4 MMOL/L (ref 3.4–5.3)
PROT SERPL-MCNC: 7.8 G/DL (ref 6.8–8.8)
RBC # BLD AUTO: 5.38 10E6/UL (ref 3.8–5.2)
RETICS # AUTO: 0.06 10E6/UL (ref 0.03–0.1)
RETICS/RBC NFR AUTO: 1.2 % (ref 0.5–2)
SODIUM SERPL-SCNC: 136 MMOL/L (ref 133–144)
TIBC SERPL-MCNC: 347 UG/DL (ref 240–430)
TSH SERPL DL<=0.005 MIU/L-ACNC: 1.48 MU/L (ref 0.4–4)
VIT B12 SERPL-MCNC: 507 PG/ML (ref 193–986)
VIT B12 SERPL-MCNC: 533 PG/ML (ref 193–986)
WBC # BLD AUTO: 13.9 10E3/UL (ref 4–11)

## 2022-05-14 PROCEDURE — 36415 COLL VENOUS BLD VENIPUNCTURE: CPT | Performed by: PATHOLOGY

## 2022-05-14 PROCEDURE — 86256 FLUORESCENT ANTIBODY TITER: CPT | Mod: 90 | Performed by: PATHOLOGY

## 2022-05-14 PROCEDURE — 85652 RBC SED RATE AUTOMATED: CPT | Performed by: PATHOLOGY

## 2022-05-14 PROCEDURE — 86355 B CELLS TOTAL COUNT: CPT | Mod: 90 | Performed by: PATHOLOGY

## 2022-05-14 PROCEDURE — 85060 BLOOD SMEAR INTERPRETATION: CPT | Performed by: STUDENT IN AN ORGANIZED HEALTH CARE EDUCATION/TRAINING PROGRAM

## 2022-05-14 PROCEDURE — 83516 IMMUNOASSAY NONANTIBODY: CPT | Mod: 90 | Performed by: PATHOLOGY

## 2022-05-14 PROCEDURE — 99215 OFFICE O/P EST HI 40 MIN: CPT | Performed by: FAMILY MEDICINE

## 2022-05-14 PROCEDURE — 86036 ANCA SCREEN EACH ANTIBODY: CPT | Mod: 90 | Performed by: PATHOLOGY

## 2022-05-14 PROCEDURE — 82306 VITAMIN D 25 HYDROXY: CPT | Mod: 90 | Performed by: PATHOLOGY

## 2022-05-14 PROCEDURE — 86140 C-REACTIVE PROTEIN: CPT | Performed by: PATHOLOGY

## 2022-05-14 PROCEDURE — 83036 HEMOGLOBIN GLYCOSYLATED A1C: CPT | Performed by: PATHOLOGY

## 2022-05-14 PROCEDURE — 86618 LYME DISEASE ANTIBODY: CPT | Mod: 90 | Performed by: PATHOLOGY

## 2022-05-14 PROCEDURE — 99000 SPECIMEN HANDLING OFFICE-LAB: CPT | Performed by: PATHOLOGY

## 2022-05-14 PROCEDURE — 82728 ASSAY OF FERRITIN: CPT | Performed by: PATHOLOGY

## 2022-05-14 PROCEDURE — 82784 ASSAY IGA/IGD/IGG/IGM EACH: CPT | Mod: 90 | Performed by: PATHOLOGY

## 2022-05-14 PROCEDURE — 83876 ASSAY MYELOPEROXIDASE: CPT | Mod: 90 | Performed by: PATHOLOGY

## 2022-05-14 PROCEDURE — 87040 BLOOD CULTURE FOR BACTERIA: CPT | Mod: 90 | Performed by: PATHOLOGY

## 2022-05-14 PROCEDURE — 82607 VITAMIN B-12: CPT | Performed by: PATHOLOGY

## 2022-05-14 PROCEDURE — 85045 AUTOMATED RETICULOCYTE COUNT: CPT | Performed by: PATHOLOGY

## 2022-05-14 PROCEDURE — 83550 IRON BINDING TEST: CPT | Performed by: PATHOLOGY

## 2022-05-14 PROCEDURE — 80050 GENERAL HEALTH PANEL: CPT | Performed by: PATHOLOGY

## 2022-05-14 RX ORDER — FLUCONAZOLE 200 MG/1
200 TABLET ORAL DAILY
Qty: 14 TABLET | Refills: 0 | Status: SHIPPED | OUTPATIENT
Start: 2022-05-14 | End: 2022-06-24

## 2022-05-14 RX ORDER — ESOMEPRAZOLE MAGNESIUM 40 MG/1
40 CAPSULE, DELAYED RELEASE ORAL 2 TIMES DAILY
Qty: 180 CAPSULE | Refills: 0 | Status: SHIPPED | OUTPATIENT
Start: 2022-05-14 | End: 2023-12-21

## 2022-05-14 ASSESSMENT — PAIN SCALES - GENERAL: PAINLEVEL: NO PAIN (0)

## 2022-05-14 ASSESSMENT — PATIENT HEALTH QUESTIONNAIRE - PHQ9
SUM OF ALL RESPONSES TO PHQ QUESTIONS 1-9: 8
5. POOR APPETITE OR OVEREATING: SEVERAL DAYS

## 2022-05-14 ASSESSMENT — ANXIETY QUESTIONNAIRES
3. WORRYING TOO MUCH ABOUT DIFFERENT THINGS: MORE THAN HALF THE DAYS
2. NOT BEING ABLE TO STOP OR CONTROL WORRYING: MORE THAN HALF THE DAYS
7. FEELING AFRAID AS IF SOMETHING AWFUL MIGHT HAPPEN: MORE THAN HALF THE DAYS
GAD7 TOTAL SCORE: 11
5. BEING SO RESTLESS THAT IT IS HARD TO SIT STILL: NOT AT ALL
1. FEELING NERVOUS, ANXIOUS, OR ON EDGE: NEARLY EVERY DAY
IF YOU CHECKED OFF ANY PROBLEMS ON THIS QUESTIONNAIRE, HOW DIFFICULT HAVE THESE PROBLEMS MADE IT FOR YOU TO DO YOUR WORK, TAKE CARE OF THINGS AT HOME, OR GET ALONG WITH OTHER PEOPLE: SOMEWHAT DIFFICULT
6. BECOMING EASILY ANNOYED OR IRRITABLE: SEVERAL DAYS

## 2022-05-14 NOTE — PROGRESS NOTES
"    Assessment & Plan     Thrush  Take prn rituxan/DM puther at risk  - fluconazole (DIFLUCAN) 200 MG tablet; Take 1 tablet (200 mg) by mouth daily    Vaginal yeast infection  Same  - fluconazole (DIFLUCAN) 200 MG tablet; Take 1 tablet (200 mg) by mouth daily    Type 2 diabetes, HbA1C goal < 8% (H)  Due  - Hemoglobin A1c; Future  - TSH with free T4 reflex; Future    Duodenal ulcer, acute  Rvwd ulcer/GI rec's  - esomeprazole (NEXIUM) 40 MG DR capsule; Take 1 capsule (40 mg) by mouth 2 times daily Take 30-60 minutes before eating.    Duodenal ulcer without hemorrhage or perforation and without obstruction  Same  - esomeprazole (NEXIUM) 40 MG DR capsule; Take 1 capsule (40 mg) by mouth 2 times daily Take 30-60 minutes before eating.    Anxiety  Consider H psychology    Hypersomnolence  Consider another BRENNA eval, discussed    Leukocytosis, unspecified type  If labs don't explain mightneed to rvw w/ rheum,jayson, ID        47 minutes spent on the date of the encounter doing chart review, history and exam, documentation and further activities per the note    BMI:   Estimated body mass index is 30.43 kg/m  as calculated from the following:    Height as of this encounter: 1.6 m (5' 3\").    Weight as of this encounter: 77.9 kg (171 lb 12.8 oz).     Kash Solano MD  [facility] [place]    Adrienne Mehta is a 56 year old who presents for the following health issues     HPI Here in f/u  1-ulcers on egd, takes ppi but not daily or bid as GI wanted, some nausea, will start that  Path does not mention H pylori  2-is on baby asa daily, has CAD, no sx but needs something,on plavix too, sees cardio in a mo, I've messaged her cardio and discussed w/ her if it'd be ok to stop it in case it triggered her ulcers, for heart she might need it in which case I'd leave her on daily hi dose h2 or ppi  3-DM due for labs worked on insulin w/ pharmD  4-hypersomnolent neg BRENNA eval six years ago, always could " fall asleep  5-no steroids since IV dose w/ April rituxan, cbc stilll wbc hi, labs pending, if neg I'll consider e consult heme or ID  Or rvw w/ rheum  Other than nausea ID ROS neg today  6=vaginitis longer course diflucan helped/tolerated      PHQ is eight, ALETHEA is 11, no SI, she has a lot of healthrelated anxiety she is aware    Past Medical History:   Diagnosis Date     Abnormal glandular Papanicolaou smear of cervix 1992     Allergic rhinitis     Allergy, airborne subst     Arthritis      ASCVD (arteriosclerotic cardiovascular disease)      Chronic pain      Chronic pancreatitis (H)     idiopathic, Tx: PPI, antioxidants, pancreatic enzymes     Common migraine      Coronary artery disease      Costochondritis      Difficulty in walking(719.7)      Dyspnea on exertion      Ectasia, mammary duct     [facility], persistent nipple discharge     Elevated fasting glucose      Gastro-oesophageal reflux disease      Granulomatosis with polyangiitis (H)      Hernia      History of angina      Hyperlipidemia LDL goal < 100      Hypertension goal BP (blood pressure) < 140/90     Essential hypertension     Iron deficiency anemia      Ischemic cardiomyopathy      Menorrhagia      Migraine headaches      Mild persistent asthma      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  disc/optic nerve, Dr. Matias, neg w/u, Rx'd w/prednisone     Ventral hernia, unspecified, without mention of obstruction or gangrene 2012     Past Surgical History:   Procedure Laterality Date     C/SECTION, LOW TRANSVERSE           CARDIAC SURGERY      cardiac stent- recent in 16; 4 other stents     DILATION AND CURETTAGE N/A 2016    Procedure: DILATION AND CURETTAGE;  Surgeon: Nahed Butler MD;  Location: [facility]     ESOPHAGOSCOPY, GASTROSCOPY, DUODENOSCOPY (EGD), COMBINED N/A 3/31/2022    Procedure: ESOPHAGOGASTRODUODENOSCOPY, WITH BIOPSY;  Surgeon: Enzo Sesay MD;  Location:  GI     [facility] UGI ENDOSCOPY W EUS  08    Dr. Pastrana, possible chronic pancreatitis, fatty liver     HERNIA REPAIR  2012    done at [facility]     INSERT INTRAUTERINE DEVICE N/A 2016    Procedure: INSERT INTRAUTERINE DEVICE;  Surgeon: Nahed Butler MD;  Location: [facility] OR     INT UTERINE FIBRIOD EMBOLIZATION  10/29/2014     LAPAROSCOPIC CHOLECYSTECTOMY  08    Dr. Arnol GRUBBS TX, CERVICAL      s/p LEEP     ORBITOTOMY Right 3/15/2016    Procedure: ORBITOTOMY;  Surgeon: Myron Cyr MD;  Location: [facility]     ORBITOTOMY Right 2017    Procedure: ORBITOTOMY;  RIGHT ORBITOTOMY AND BIOPSY;  Surgeon: Charis Holbrook MD;  Location: [facility]     REPAIR PTOSIS Right 2017    Procedure: REPAIR PTOSIS;  RIGHT UPPER LID PTOSIS REPAIR;  Surgeon: Myron Cyr MD;  Location: [facility]     UPPER GI ENDOSCOPY  10/21/08    mild gastritis, Dr. Rocky [facility] XTH[facility]AC[facility],COMPLETE, W/O DOPPLER  04    [facility] WNL, EF 60%     Current Outpatient Medications   Medication     acetaminophen (TYLENOL) 325 MG tablet     albuterol (2.5 MG/3ML) 0.083% neb solution     albuterol (PROAIR HFA, PROVENTIL HFA, VENTOLIN HFA) 108 (90 BASE) MCG/ACT inhaler     aspirin (ASPIRIN LOW DOSE) 81 MG EC tablet     blood glucose (NO BRAND SPECIFIED) lancets standard     blood glucose (NO BRAND  SPECIFIED) test strip     blood glucose monitoring (NO BRAND SPECIFIED) meter device kit     blood glucose monitoring (NO BRAND SPECIFIED) test strip     Blood Pressure Monitor KIT     calcium carbonate (TUMS) 500 MG chewable tablet     clopidogrel (PLAVIX) 75 MG tablet     COMPRESSION STOCKINGS     cyclobenzaprine (FLEXERIL) 10 MG tablet     dicyclomine (BENTYL) 20 MG tablet     diphenhydrAMINE (BENADRYL) 25 MG capsule     EPIPEN 2-RIKY 0.3 MG/0.3ML injection     Ergocalciferol (VITAMIN D2) 50 MCG (2000 UT) TABS     esomeprazole (NEXIUM) 40 MG DR capsule     famotidine (PEPCID) 20 MG tablet     fexofenadine (ALLEGRA) 180 MG tablet     fluconazole (DIFLUCAN) 200 MG tablet     fluocinolone (SYNALAR) 0.01 % solution     fluocinonide (LIDEX) 0.05 % external ointment     fluticasone-vilanterol (BREO ELLIPTA) 200-25 MCG/INH inhaler     folic acid (FOLVITE) 1 MG tablet     insulin glargine (LANTUS PEN) 100 UNIT/ML pen     insulin pen needle (BD MARCK U/F) 32G X 4 MM miscellaneous     ketoconazole (NIZORAL) 2 % shampoo     lisinopril-hydrochlorothiazide (ZESTORETIC) 20-25 MG tablet     magnesium 250 MG tablet     metFORMIN (GLUCOPHAGE-XR) 500 MG 24 hr tablet     metoprolol succinate ER (TOPROL-XL) 100 MG 24 hr tablet     montelukast (SINGULAIR) 10 MG tablet     Multiple Vitamin (DAILY-GERALD) TABS     nitroGLYcerin (NITROSTAT) 0.4 MG sublingual tablet     nystatin (MYCOSTATIN) 091277 UNIT/ML suspension     ondansetron (ZOFRAN-ODT) 4 MG ODT tab     ondansetron (ZOFRAN-ODT) 8 MG ODT tab     polyethylene glycol (MIRALAX) 17 GM/Dose powder     pravastatin (PRAVACHOL) 40 MG tablet     sennosides (SENOKOT) 8.6 MG tablet     spironolactone (ALDACTONE) 25 MG tablet     sucralfate (CARAFATE) 1 GM/10ML suspension     SYMBICORT 80-4.5 MCG/ACT Inhaler     terbinafine (LAMISIL) 1 % external cream     terconazole (TERAZOL 3) 80 MG vaginal suppository     traMADol (ULTRAM) 50 MG tablet     vitamin D2 (ERGOCALCIFEROL) 63225 units (1250 mcg)  "capsule     vitamin D3 (CHOLECALCIFEROL) 50 mcg (2000 units) tablet     No current facility-administered medications for this visit.     Allergies   Allergen Reactions     Amoxicillin-Pot Clavulanate      Augmentin      Unknown possible hives and edema     Azithromycin      Diatrizoate Other (See Comments)     Pt wants this listed because she is allergic to shellfish      Imitrex [Sumatriptan]      Severe face/neck/chest tightness and flushing side effects      Penicillins Hives     Unknown      Pork Allergy      Stomach pain, cramping, diarrhea, itching, nausea and headaches     Shellfish Allergy Hives and Swelling     Sulfa Drugs Hives and Swelling     Zithromax [Azithromycin Dihydrate] Swelling     Unknown            Review of Systems         Objective    /86   Pulse 71   Temp 98.3  F (36.8  C) (Oral)   Resp 12   Ht 1.6 m (5' 3\")   Wt 77.9 kg (171 lb 12.8 oz)   SpO2 99%   BMI 30.43 kg/m    Body mass index is 30.43 kg/m .  Physical Exam   GENERAL: healthy, alert and no distress  NECK: no adenopathy, no asymmetry, masses, or scars and thyroid normal to palpation  RESP: lungs clear to auscultation - no rales, rhonchi or wheezes  CV: regular rate and rhythm, normal S1 S2, no S3 or S4, no murmur, click or rub, no peripheral edema and peripheral pulses strong  ABDOMEN: soft, nontender, no hepatosplenomegaly, no masses and bowel sounds normal  MS: no gross musculoskeletal defects noted, no edema                "

## 2022-05-14 NOTE — NURSING NOTE
Chief Complaint   Patient presents with     Gastrointestinal Problem     Patient comes in for a follow up for GI issus.         Luis Manuel Pugh MA on 5/14/2022 at 9:21 AM

## 2022-05-15 ASSESSMENT — ANXIETY QUESTIONNAIRES: GAD7 TOTAL SCORE: 11

## 2022-05-16 ENCOUNTER — TELEPHONE (OUTPATIENT)
Dept: FAMILY MEDICINE | Facility: CLINIC | Age: 57
End: 2022-05-16
Payer: COMMERCIAL

## 2022-05-16 ENCOUNTER — TELEPHONE (OUTPATIENT)
Dept: CARDIOLOGY | Facility: CLINIC | Age: 57
End: 2022-05-16
Payer: COMMERCIAL

## 2022-05-16 LAB
ANCA AB PATTERN SER IF-IMP: NORMAL
B BURGDOR IGG+IGM SER QL: 0.14
C-ANCA TITR SER IF: NORMAL {TITER}
DEPRECATED CALCIDIOL+CALCIFEROL SERPL-MC: 35 UG/L (ref 20–75)
IGA SERPL-MCNC: 205 MG/DL (ref 84–499)
IGG SERPL-MCNC: 605 MG/DL (ref 610–1616)
IGM SERPL-MCNC: 33 MG/DL (ref 35–242)

## 2022-05-16 NOTE — TELEPHONE ENCOUNTER
----- Message from Kash Solano MD sent at 5/16/2022  8:16 AM CDT -----  Thanks!  Brenna can you let her know to stop the aspirin and just keep on the plavix, in hopes of letting ulcers heal  ----- Message -----  From: Milan Peace MD  Sent: 5/15/2022   9:22 PM CDT  To: Kash Solano MD, Yesys Calzada RN    Thanks Jennifer    We can stop aspirin and only keep clopidogrel    DD  ----- Message -----  From: Kash Solano MD  Sent: 5/14/2022   9:39 AM CDT  To: MD Milan Colbert, I am just seeing Janine. She sees you in a month. No cardiac symptoms. Recent diagnosis duodenal ulcers, no H pylori. Looking at her meds, she is of course on a baby aspirin daily for many years. I do not know if it is the trigger, and we discussed that it might not be safe for her to stop it given her cardiac history, but if you could talk about that with her would be appreciated.  Thanks  Horace

## 2022-05-16 NOTE — TELEPHONE ENCOUNTER
[facility] Call Center    Phone Message    May a detailed message be left on voicemail: yes     Reason for Call: Other: patient was returning call to RN. She stated if she doesnt answer the first time to try again or 2 right after because sometimes her phone has a hard time connecting.  tried backline twice with no answer.    Action Taken: Other: Cardiology    Travel Screening: Not Applicable

## 2022-05-16 NOTE — TELEPHONE ENCOUNTER
[facility] Call Center    Phone Message    May a detailed message be left on voicemail: yes     Reason for Call: Other: patient said she got a call around 2:30 from the cardiology office. Please return call.     Action Taken: Other: Cardiology    Travel Screening: Not Applicable

## 2022-05-16 NOTE — TELEPHONE ENCOUNTER
Spoke to the patient to relay message from Cardiologist and PCP. Patient states that she is not comfortable with that and is not sure that she will be doing that. Pt inquired as to what they will be doing for her in place of her stopping the aspirin, because it will be putting her at risk if she stops the aspirin.     Relayed to patient that the recommendation was coming from her cardiologist and PCP was agreeable to recommendation, considering her recent diagnosis of duodenal ulcers and unsure if the aspirin was possibly a trigger of dx.    Pt stated that when she spoke to PCP they discussed that she would talk to her cardiologist when she sees him(cardiologist). Patient states she will not make the change in medication right now.   Laney Haddad LPN 5/16/2022 1:13 PM

## 2022-05-16 NOTE — PROGRESS NOTES
Called pt. Notified her that, per Dr. Solano and Dr. Peace, she should stop aspirin for now d/t duodenal ulcers.    Pt verbalized understanding.

## 2022-05-17 ENCOUNTER — TELEPHONE (OUTPATIENT)
Dept: FAMILY MEDICINE | Facility: CLINIC | Age: 57
End: 2022-05-17
Payer: COMMERCIAL

## 2022-05-17 LAB
MYELOPEROXIDASE AB SER IA-ACNC: 0.5 U/ML
MYELOPEROXIDASE AB SER IA-ACNC: NEGATIVE
PROTEINASE3 AB SER IA-ACNC: <1 U/ML
PROTEINASE3 AB SER IA-ACNC: NEGATIVE

## 2022-05-17 NOTE — TELEPHONE ENCOUNTER
Left message with [facility] number to call back to schedule a follow up with Dr Solano after patient see Dr Peace per provider last visit 5/14/22.

## 2022-05-19 LAB
BACTERIA BLD CULT: NO GROWTH
PATH REPORT.COMMENTS IMP SPEC: NORMAL
PATH REPORT.COMMENTS IMP SPEC: NORMAL
PATH REPORT.FINAL DX SPEC: NORMAL
PATH REPORT.MICROSCOPIC SPEC OTHER STN: NORMAL
PATH REPORT.MICROSCOPIC SPEC OTHER STN: NORMAL
PATH REPORT.RELEVANT HX SPEC: NORMAL

## 2022-05-30 DIAGNOSIS — M35.9 UNDIFFERENTIATED CONNECTIVE TISSUE DISEASE (H): ICD-10-CM

## 2022-05-31 NOTE — TELEPHONE ENCOUNTER
TRAMADOL 50MG TABLETS  Last Written Prescription Date:   11/1/2021  Last Fill Quantity: 60,   # refills: 3  Last Office Visit :  4/22/2022  Future Office visit:   8/19/2022    Routing refill request to provider for review/approval because:  Drug not on the [facility], [facility] or [facility] refill protocol or controlled substance      Georgia Mercedes RN  Central Triage Red Flags/Med Refills

## 2022-06-01 ENCOUNTER — TELEPHONE (OUTPATIENT)
Dept: RHEUMATOLOGY | Facility: CLINIC | Age: 57
End: 2022-06-01
Payer: COMMERCIAL

## 2022-06-01 NOTE — TELEPHONE ENCOUNTER
Pt called and left a message letting me know that her eye is swelling, started in the cheek, and under her eye.  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    Called pt to get more information.  It is her right eye that is currently swelling, and it is a good portion of the right side of her face as well. She states that it started 2 days ago with her cheek being sore over the cheek bone, then she noticed what looked like a blister under her eye.  She has been using warm and cold packs and can get the swelling to go down through out the day, but over night it starts to swell again, and when she wakes the eye can be almost swollen shut.  She states she does not have any vision problems but will notice a mucus like film over the eye.  She also feels that she is more light sensitive now than she was before.     She did have a sty in the left eye last week, so she did try sty ointment earlier this week, but it had no effect.    Will send to Dr. Mckinnon to review and advise and then will update pt.    Desiree Godinez RN  Rheumatology Clinic

## 2022-06-02 ENCOUNTER — VIRTUAL VISIT (OUTPATIENT)
Dept: PHARMACY | Facility: CLINIC | Age: 57
End: 2022-06-02
Payer: COMMERCIAL

## 2022-06-02 ENCOUNTER — TELEPHONE (OUTPATIENT)
Dept: OPHTHALMOLOGY | Facility: CLINIC | Age: 57
End: 2022-06-02
Payer: COMMERCIAL

## 2022-06-02 DIAGNOSIS — E11.9 TYPE 2 DIABETES, HBA1C GOAL < 7% (H): ICD-10-CM

## 2022-06-02 PROCEDURE — 99606 MTMS BY PHARM EST 15 MIN: CPT | Performed by: PHARMACIST

## 2022-06-02 PROCEDURE — 99607 MTMS BY PHARM ADDL 15 MIN: CPT | Performed by: PHARMACIST

## 2022-06-02 NOTE — PROGRESS NOTES
Medication Therapy Management (MTM) Encounter    ASSESSMENT:                            Medication Adherence/Access: No issues identified    Type 2 Diabetes: Patient is not meeting A1c goal of < 7%. Self monitoring of blood glucose is not at goal of fasting  mg/dL. Would benefit from increased basal insulin and initiation of an SGLT2i. Mealtime insulin would also be beneficial or splitting into two long acting doses for better absorption but the patient declined additional doses of insulin.     PLAN:                            1. Increase Lantus to 75 units once daily.   2. I will message Dr. Solano about initiating Jardiance 10 mg. (Approved by PCP)    Follow-up: Return in about 1 month (around 2022).    SUBJECTIVE/OBJECTIVE:                          Janine Cornell is a 56 year old female called for a follow-up visit.  Today's visit is a follow-up MTM visit from 22     Reason for visit: diabetes follow-up.    Allergies/ADRs: Reviewed in chart  Past Medical History: Reviewed in chart  Tobacco: She reports that she has been smoking cigarettes. She has a 0.20 pack-year smoking history. She has never used smokeless tobacco.Tobacco Cessation Action Plan:   Not discussed      Medication Adherence/Access: no issues reported    Type 2 Diabetes:  Currently taking Lantus 70 units every morning, and metformin ER 2000 mg daily. Patient refuses to answer questions about side effects.  Blood sugar monitorin-3 time(s) daily. Ranges (patient reported): Mostly takes her blood sugars in the morning. On the low end she has seen down to 130 and the high end up to 300 mg/dL. Hasnt seen under 100 in over a month.     Continuous glucose monitors have been explained to her before and is not interested in using this. She is not interested in meal time insulin right now because it would be something she wouldn't be able to keep track of. She feels that she would not be able to manage using mealtime insulin. Previously  "tried osmotic metformin and had \"severe\" stomach pain.    Discussed SGLT2 however she has recurring yeast infections. Despite this she is interested in starting an SGLT2-inhibitor     Symptoms of low blood sugar?None recently  Diet/Exercise: My diet has changed over the past 2 years. I don't need help with how to eat. Some vegetables make her blood sugar go high.   Aspirin: patient declined to discuss  Statin: patient declined to discuss  ACEi/ARB: patient declined to discuss  Urine Albumin:     Lab Results   Component Value Date    UMALCR 6.87 11/15/2017      Lab Results   Component Value Date    A1C 11.2 05/14/2022    A1C 10.8 02/04/2022    A1C 8.0 06/18/2020    A1C 9.2 03/06/2019    A1C 9.6 11/09/2018    A1C 8.4 11/15/2017    A1C 8.8 06/28/2017       Today's Vitals: There were no vitals taken for this visit.  ----------------      I spent 16 minutes with this patient today. All changes were made via collaborative practice agreement with Kash Solano MD. A copy of the visit note was provided to the patient's provider(s).    The patient declined a summary of these recommendations.     Nilesh Blue, Pharm. D., BCACP  Medication Therapy Management Pharmacist  Direct Voicemail: [phone]      Telemedicine Visit Details  Type of service:  Telephone visit  Start Time: 11:00 am  End Time: 11:16 AM  Originating Location (patient location): [place]  Distant Location (provider location):  [facility]     Medication Therapy Recommendations  Type 2 diabetes, HbA1c goal < 7% (H)    Current Medication: insulin glargine (LANTUS PEN) 100 UNIT/ML pen   Rationale: Dose too low - Dosage too low - Effectiveness   Recommendation: Increase Dose   Status: Accepted per CPA          Rationale: Synergistic therapy - Needs additional medication therapy - Indication   Recommendation: Start Medication - Jardiance 10 MG Tabs   Status: Accepted per Provider              "

## 2022-06-02 NOTE — TELEPHONE ENCOUNTER
Duplicate encounter from today    Will document in other encounter    Reggie Linares RN 1:47 PM 06/02/22    ---        Message received by triage from rheumatology regarding new eye swelling    H/o orbital inflammation syndrome    Will reach out to pt today    Reggie Linares RN 1:39 PM 06/02/22

## 2022-06-02 NOTE — TELEPHONE ENCOUNTER
Dr. Mckinnon recommends that pt see ophthalmology.      Left message for ophthalmology triage nurse.      Updated pt, that Dr. Mckinnon is recommending that she discuss with ophthalmology.  Have left a message.    Desiree Godinez RN  Rheumatology Clinic

## 2022-06-02 NOTE — TELEPHONE ENCOUNTER
Spoke to pt at 1425    Right eye swelling around eye and to cheek started on Sunday/monday    Pt using warm and cold compresses and swelling way down per patient    Tissue was real red and had a blister like sac on tissue around eye.    -- Today some puffiness   Sac of fluid dissipated  Slight redness    less mucus in eye  No redness on white part of eye    Intermittent blurring of vision with mucus    No other vision changes    No noted proptosis    H/o idiopathic orbital inflammation syndrome    Pt on Rituxin 2-4 times a year  No oral prednisone    Offered appt tomorrow in acute clinic and pt declining as symptoms have mostly resolve now.    Reviewed recommendation to see eye provider for swelling and h/o Vasculitis    Pt states vision stable and symptoms resolved and would like to hold on appt    Pt currently scheduled in august at this time with Dr. Vernon and offerred earlier appt June 23rd with Dr. Vernon and pt holding on earlier scheduling    Reviewed to contact clinic for any worsening symptoms/vision changes    Reviewed [phone] option 4 for information to page on call eye provider if would occur after hours/weekend.    Reggie Linares RN 2:38 PM 06/02/22

## 2022-06-04 RX ORDER — TRAMADOL HYDROCHLORIDE 50 MG/1
50 TABLET ORAL EVERY 8 HOURS PRN
Qty: 60 TABLET | Refills: 3 | Status: SHIPPED | OUTPATIENT
Start: 2022-06-04 | End: 2022-12-30

## 2022-06-17 ENCOUNTER — TELEPHONE (OUTPATIENT)
Dept: INTERNAL MEDICINE | Facility: CLINIC | Age: 57
End: 2022-06-17

## 2022-06-17 ENCOUNTER — NURSE TRIAGE (OUTPATIENT)
Dept: NURSING | Facility: CLINIC | Age: 57
End: 2022-06-17
Payer: COMMERCIAL

## 2022-06-17 ENCOUNTER — LAB (OUTPATIENT)
Dept: LAB | Facility: CLINIC | Age: 57
End: 2022-06-17
Payer: COMMERCIAL

## 2022-06-17 DIAGNOSIS — N39.0 URINARY TRACT INFECTION: Primary | ICD-10-CM

## 2022-06-17 DIAGNOSIS — I77.82 ANCA-ASSOCIATED VASCULITIS (H): ICD-10-CM

## 2022-06-17 DIAGNOSIS — R30.0 DYSURIA: Primary | ICD-10-CM

## 2022-06-17 DIAGNOSIS — Z51.81 ENCOUNTER FOR MEDICATION MONITORING: ICD-10-CM

## 2022-06-17 LAB
ALBUMIN UR-MCNC: 30 MG/DL
APPEARANCE UR: ABNORMAL
BACTERIA #/AREA URNS HPF: ABNORMAL /HPF
BILIRUB UR QL STRIP: NEGATIVE
COLOR UR AUTO: ABNORMAL
CREAT UR-MCNC: 46 MG/DL
GLUCOSE UR STRIP-MCNC: 150 MG/DL
HGB UR QL STRIP: ABNORMAL
KETONES UR STRIP-MCNC: NEGATIVE MG/DL
LEUKOCYTE ESTERASE UR QL STRIP: ABNORMAL
NITRATE UR QL: NEGATIVE
PH UR STRIP: 8 [PH] (ref 5–7)
PROT UR-MCNC: 0.65 G/L
PROT/CREAT 24H UR: 1.41 G/G CR (ref 0–0.2)
RBC URINE: >182 /HPF
SP GR UR STRIP: 1.01 (ref 1–1.03)
UROBILINOGEN UR STRIP-MCNC: NORMAL MG/DL
WBC URINE: >182 /HPF

## 2022-06-17 PROCEDURE — 87086 URINE CULTURE/COLONY COUNT: CPT | Mod: 90 | Performed by: PATHOLOGY

## 2022-06-17 PROCEDURE — 84156 ASSAY OF PROTEIN URINE: CPT | Performed by: PATHOLOGY

## 2022-06-17 PROCEDURE — 99000 SPECIMEN HANDLING OFFICE-LAB: CPT | Performed by: PATHOLOGY

## 2022-06-17 PROCEDURE — 81001 URINALYSIS AUTO W/SCOPE: CPT | Performed by: PATHOLOGY

## 2022-06-17 RX ORDER — NITROFURANTOIN 25; 75 MG/1; MG/1
100 CAPSULE ORAL 2 TIMES DAILY
Qty: 14 CAPSULE | Refills: 0 | Status: SHIPPED | OUTPATIENT
Start: 2022-06-17 | End: 2022-08-19

## 2022-06-17 NOTE — TELEPHONE ENCOUNTER
I reviewed the result of UA/UC and it shows UTI, but the final culture is in process.  Per clinic protocol, I sent macrobid after reviewing allergy list. Pt was informed.

## 2022-06-17 NOTE — TELEPHONE ENCOUNTER
Nurse Triage SBAR    Is this a 2nd Level Triage?   Yes      Situation:  Urinary symptoms    Background/Assessment:     Pt reporting, having burning, urgency, frequency upon urination.  Hot sweats, cold sweats and the chills for the last few days.   Today when Pt woke up.  She had blood in her urine when she urinated.       The symptoms are getting worse.    Some times she feels like she has to urinate and can not make it to the restroom.   Other times she has profuse pain in the bladder area. It feels like pin needles are poking her in the urinary bladder.     Pt has a urine cup at home with the labels.  Pt would like to drop off a urine sample at the lab today.         Could the Provider place an order for a UA/UC in the computer.  So the Pt could drop off a specimen.        I had suggested Urgent Care.  But, Pt decline Urgent Care and wanted to drop off a lab sample to have it tested today for bacteria.    Please call Pt when Lab has been placed.  So she can drop off a specimen at the lab.    Thank you     Georgia Mercedes RN  Central Triage Red Flags/Med Refills      Protocol Recommended Disposition:   See Today In Office    Reason for Disposition    Age > 50 years    Additional Information    Negative: Shock suspected (e.g., cold/pale/clammy skin, too weak to stand, low BP, rapid pulse)    Negative: Sounds like a life-threatening emergency to the triager    Negative: Unable to urinate (or only a few drops) and bladder feels very full    Negative: Vomiting    Negative: Patient sounds very sick or weak to the triager    Negative: SEVERE pain with urination    Negative: Fever > 100.4 F (38.0 C)    Negative: Side (flank) or lower back pain present    Negative: Taking antibiotic > 24 hours for UTI and fever persists    Negative: Taking antibiotic > 3 days for UTI and painful urination not improved    Negative: Unusual vaginal discharge    Negative: > 2 UTIs in last year    Negative: Patient is worried about sexually  transmitted disease (STD)    Protocols used: URINATION PAIN - FEMALE-A-[place]

## 2022-06-19 LAB — BACTERIA UR CULT: NO GROWTH

## 2022-06-21 ENCOUNTER — TELEPHONE (OUTPATIENT)
Dept: RHEUMATOLOGY | Facility: CLINIC | Age: 57
End: 2022-06-21

## 2022-06-21 NOTE — TELEPHONE ENCOUNTER
Left message requesting that pt return call to discuss lab results.  Let her know it is not urgent.    Desiree Godinez RN  Rheumatology Clinic

## 2022-06-21 NOTE — TELEPHONE ENCOUNTER
----- Message from Majo Mckinnon MD sent at 6/20/2022 12:30 PM CDT -----  Negative urine culture, but you have protein and blood in the urine. Any UTI symptoms?

## 2022-06-22 NOTE — TELEPHONE ENCOUNTER
Majo Mckinnon MD Beard, Madeline, RN  Caller: Unspecified (Yesterday,  4:02 PM)  Ok, thank you, she could do follow up with PCP then for UTI and yeats infection.     Called and updated pt.    Desiree Godinez RN  Rheumatology Clinic

## 2022-06-22 NOTE — TELEPHONE ENCOUNTER
Called and spoke with pt, she is having s/s of UTI, she did call her PCP, was given macrobid and now has developed a yeast infection.  She is having discharge, itching and is a yeast infection, does develop them with antibiotics frequently.  She has tried fluconozole pill, it works as long as she is on it, but as soon as she stops fluconozole, it returns.  In the past she has tried both the cream and pill at the same time and it hasn't made a difference.      Pt had actually called in on Friday for a UTI.    Desiree Godinez RN  Rheumatology Clinic

## 2022-06-24 ENCOUNTER — TELEPHONE (OUTPATIENT)
Dept: FAMILY MEDICINE | Facility: CLINIC | Age: 57
End: 2022-06-24

## 2022-06-24 DIAGNOSIS — B37.31 VAGINAL YEAST INFECTION: ICD-10-CM

## 2022-06-24 DIAGNOSIS — B37.0 THRUSH: ICD-10-CM

## 2022-06-24 RX ORDER — FLUCONAZOLE 200 MG/1
200 TABLET ORAL DAILY
Qty: 14 TABLET | Refills: 0 | Status: SHIPPED | OUTPATIENT
Start: 2022-06-24 | End: 2022-08-19

## 2022-06-24 NOTE — TELEPHONE ENCOUNTER
RN left voice message letting patient know that Rx was filled by Dr. Solano.    Marianne Steele RN on 6/24/2022 at 1:58 PM

## 2022-06-24 NOTE — TELEPHONE ENCOUNTER
RN called patient and she asked for a refill of fluconazole (DIFLUCAN) 200 MG tablet for another yeast infection, last filled on 5/14/22.    Mraianne Steele RN on 6/24/2022 at 11:58 AM

## 2022-06-24 NOTE — TELEPHONE ENCOUNTER
[facility] Call Center    Phone Message    May a detailed message be left on voicemail: yes     Reason for Call: Other: Pt requesting call back from Dr. Solano's nurse to discuss a medication     Action Taken: Message routed to:  [facility] pcc

## 2022-07-07 ENCOUNTER — OFFICE VISIT (OUTPATIENT)
Dept: CARDIOLOGY | Facility: CLINIC | Age: 57
End: 2022-07-07
Attending: INTERNAL MEDICINE
Payer: COMMERCIAL

## 2022-07-07 VITALS
SYSTOLIC BLOOD PRESSURE: 128 MMHG | BODY MASS INDEX: 31.96 KG/M2 | DIASTOLIC BLOOD PRESSURE: 79 MMHG | HEART RATE: 78 BPM | HEIGHT: 62 IN | OXYGEN SATURATION: 99 % | WEIGHT: 173.7 LBS

## 2022-07-07 DIAGNOSIS — I10 BENIGN ESSENTIAL HYPERTENSION: ICD-10-CM

## 2022-07-07 DIAGNOSIS — I25.10 ASCVD (ARTERIOSCLEROTIC CARDIOVASCULAR DISEASE): ICD-10-CM

## 2022-07-07 DIAGNOSIS — I21.4 NSTEMI (NON-ST ELEVATED MYOCARDIAL INFARCTION) (H): ICD-10-CM

## 2022-07-07 DIAGNOSIS — I10 HYPERTENSION, UNSPECIFIED TYPE: ICD-10-CM

## 2022-07-07 DIAGNOSIS — I10 HYPERTENSION GOAL BP (BLOOD PRESSURE) < 140/90: ICD-10-CM

## 2022-07-07 DIAGNOSIS — E78.5 HYPERLIPIDEMIA LDL GOAL <70: ICD-10-CM

## 2022-07-07 DIAGNOSIS — I25.10 CORONARY ARTERY DISEASE INVOLVING NATIVE HEART WITHOUT ANGINA PECTORIS, UNSPECIFIED VESSEL OR LESION TYPE: ICD-10-CM

## 2022-07-07 PROCEDURE — G0463 HOSPITAL OUTPT CLINIC VISIT: HCPCS

## 2022-07-07 PROCEDURE — 99214 OFFICE O/P EST MOD 30 MIN: CPT | Performed by: INTERNAL MEDICINE

## 2022-07-07 RX ORDER — PRAVASTATIN SODIUM 40 MG
40 TABLET ORAL DAILY
Qty: 30 TABLET | Refills: 11 | Status: SHIPPED | OUTPATIENT
Start: 2022-07-07 | End: 2023-06-29

## 2022-07-07 RX ORDER — CLOPIDOGREL BISULFATE 75 MG/1
75 TABLET ORAL DAILY
Qty: 30 TABLET | Refills: 11 | Status: SHIPPED | OUTPATIENT
Start: 2022-07-07 | End: 2023-06-29

## 2022-07-07 RX ORDER — SPIRONOLACTONE 25 MG/1
25 TABLET ORAL DAILY
Qty: 45 TABLET | Refills: 11 | Status: SHIPPED | OUTPATIENT
Start: 2022-07-07 | End: 2023-08-28

## 2022-07-07 RX ORDER — LISINOPRIL AND HYDROCHLOROTHIAZIDE 20; 25 MG/1; MG/1
1 TABLET ORAL DAILY
Qty: 30 TABLET | Refills: 11 | Status: SHIPPED | OUTPATIENT
Start: 2022-07-07 | End: 2023-06-29

## 2022-07-07 RX ORDER — METOPROLOL SUCCINATE 100 MG/1
100 TABLET, EXTENDED RELEASE ORAL DAILY
Qty: 30 TABLET | Refills: 11 | Status: SHIPPED | OUTPATIENT
Start: 2022-07-07 | End: 2023-06-29

## 2022-07-07 RX ORDER — NITROGLYCERIN 0.4 MG/1
0.4 TABLET SUBLINGUAL EVERY 5 MIN PRN
Qty: 25 TABLET | Refills: 3 | Status: SHIPPED | OUTPATIENT
Start: 2022-07-07 | End: 2024-06-05

## 2022-07-07 ASSESSMENT — PAIN SCALES - GENERAL: PAINLEVEL: NO PAIN (0)

## 2022-07-07 NOTE — PATIENT INSTRUCTIONS
July 7, 2022    Cardiology Provider You Saw During Your Visit: Dr. Peace      Medication Changes: None      Follow Up: In 1 year with fasting labs and echocardiogram prior      Follow the American Heart Association Diet and Lifestyle Recommendations:  -Limit saturated fat, trans fat, sodium, red meat, sweets and sugar-sweetened beverages. If you choose to eat red meat, compare labels and select the leanest cuts available.  -Aim for at least 150 minutes of moderate physical activity or 75 minutes of vigorous physical activity - or an equal combination of both - each week.      To Reach Us:  -During business hours: [phone], press option # 1 to schedule an appointment or to leave a message for your care team.     -After hours, weekends or holidays: [phone], press option #4 and ask to speak to the on-call cardiologist. Inform them you are a patient at the [place].      Yessy Calzada RN  Cardiology Care Coordinator - General Cardiology  [facility]

## 2022-07-07 NOTE — NURSING NOTE
Chief Complaint   Patient presents with     Follow Up     Return Cardiology- Duprez follow up     Vitals were taken and medications reconciled.    José Antonio Latif, EMT  4:16 PM

## 2022-07-07 NOTE — LETTER
7/7/2022      RE: Janine Cornell  [address]  [facility] [address]       Dear Colleague,    Thank you for the opportunity to participate in the care of your patient, Janine Cornell, at the [facility] [place] [facility]. Please see a copy of my visit note below.    HPI:     I had the privilege to evaluate and examine during a phone  Ms Janine Cornell, who is a 56 yr -American patient with DM2, Hypothyroidism, PCOS, Dyslipidemia, HTN, and CAD S/P NSTEMI s/p KATHERINE to the mLAD in 2011 and RCA in 2016      Patient has a positive RA factor and fibromyalgia and follows with Dr Rodriguez for the effect of plaquenil on her retina.     Since the COVID19 period started,patient has been more sedentary.  Patient reports that she has chest discomfort, but this is relatively stable.  She feels relatively tired - however her complaints have been relatively stable. Her BP has been higher as before resulting in less energy. She explains that during COVID19 period - she has been much sedentary.  She denies palpitations, intermittent claudication. She has many chronic symptoms that appear to be stable at this time.       PAST MEDICAL HISTORY:  Past Medical History:   Diagnosis Date     Abnormal glandular Papanicolaou smear of cervix 1992     Allergic rhinitis     Allergy, airborne subst     Arthritis      ASCVD (arteriosclerotic cardiovascular disease)      Chronic pain      Chronic pancreatitis (H)     idiopathic, Tx: PPI, antioxidants, pancreatic enzymes     Common migraine      Coronary artery disease      Costochondritis      Difficulty in walking(719.7)      Dyspnea on exertion      Ectasia, mammary duct     [facility], persistent nipple discharge     Elevated fasting glucose      Gastro-oesophageal reflux disease      Granulomatosis with polyangiitis (H)      Hernia      History of angina      Hyperlipidemia LDL goal < 100      Hypertension  goal BP (blood pressure) < 140/90     Essential hypertension     Iron deficiency anemia      Ischemic cardiomyopathy      Menorrhagia      Migraine headaches      Mild persistent asthma      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 Matias, neg w/u, Rx'd w/prednisone     Ventral hernia, unspecified, without mention of obstruction or gangrene 07/2012       CURRENT MEDICATIONS:  Current Outpatient Medications   Medication Sig Dispense Refill     acetaminophen (TYLENOL) 325 MG tablet Take 1-2 tablets (325-650 mg) by mouth every 6 hours as needed for pain (headache) 250 tablet 4     albuterol (2.5 MG/3ML) 0.083% neb solution INL 1 VIAL VIA NEBULIZATION Q 4 TO 6 HOURS PRN  1     albuterol (PROAIR HFA, PROVENTIL HFA, VENTOLIN HFA) 108 (90 BASE) MCG/ACT inhaler Inhale 2 puffs into the lungs every 6 hours as needed for shortness of breath / dyspnea or wheezing 3 Inhaler 1     blood glucose (NO BRAND SPECIFIED) lancets standard Use to test blood sugar 1-4 times daily or as directed. 400 each 3     blood glucose (NO BRAND SPECIFIED) test strip Use to test blood sugar 1-4 times daily or as directed. 400 strip 3     blood glucose monitoring (NO BRAND SPECIFIED) meter device kit Use to test blood sugar 4 X times daily or as directed. (Patient taking differently: 1 kit Use to  test blood sugar 4 X times daily or as directed.) 1 kit 0     blood glucose monitoring (NO BRAND SPECIFIED) test strip 1 strip by In Vitro route 4 times daily Test as directed. Please dispense three months and three months of refills. Thank you. (Patient taking differently: 1 strip by In Vitro route 4 times daily Test as directed. Please dispense three months and three months of refills. Thank you.) 360 each 3     Blood Pressure Monitor KIT 1 each daily Monitor home blood pressure as instructed by physician.  Dispense [place] blood pressure kit. 1 kit 0     calcium carbonate (TUMS) 500 MG chewable tablet Take 3-4 tablets (1,500-2,000 mg) by mouth daily as needed 90 tablet 3     clopidogrel (PLAVIX) 75 MG tablet Take 1 tablet (75 mg) by mouth daily . 30-day refills only. 30 tablet 11     COMPRESSION STOCKINGS 2 each daily Apply one 10-15 mmHg compression stocking to each lower extgmierty during the day and remove before bedtime. 4 each 2     cyclobenzaprine (FLEXERIL) 10 MG tablet Take 1 tablet (10 mg) by mouth 2 times daily as needed for muscle spasms 60 tablet 3     dicyclomine (BENTYL) 20 MG tablet TAKE 1 TABLET(20 MG) BY MOUTH FOUR TIMES DAILY AS NEEDED. Pls schedule clinic appt for refills. 60 tablet 0     diphenhydrAMINE (BENADRYL) 25 MG capsule Take 1 capsule (25 mg) by mouth nightly as needed for itching or allergies 30 capsule 2     EPIPEN 2-RIKY 0.3 MG/0.3ML injection INJECT 0.3 MG INTO THE MUSCLE PRF ANAPHYALAXIS  0     Ergocalciferol (VITAMIN D2) 50 MCG (2000 UT) TABS Take 2,000 Units by mouth daily 90 tablet 1     esomeprazole (NEXIUM) 40 MG DR capsule Take 1 capsule (40 mg) by mouth 2 times daily Take 30-60 minutes before eating. 180 capsule 0     fexofenadine (ALLEGRA) 180 MG tablet Take 1 tablet by mouth daily as needed. 90 tablet 3     fluconazole (DIFLUCAN) 200 MG tablet Take 1 tablet (200 mg) by mouth daily 14 tablet 0     folic acid (FOLVITE) 1 MG tablet Take 1 tablet (1 mg) by mouth daily 90 tablet  2     insulin glargine (LANTUS PEN) 100 UNIT/ML pen Inject 75 Units Subcutaneous every morning Or as directed. 75 mL 3     insulin pen needle (BD MARCK U/F) 32G X 4 MM miscellaneous USE DAILY OR AS DIRECTED 300 each 3     ketoconazole (NIZORAL) 2 % shampoo Apply topically daily as needed       lisinopril-hydrochlorothiazide (ZESTORETIC) 20-25 MG tablet Take 1 tablet by mouth daily . 30-day refills only. 30 tablet 11     magnesium 250 MG tablet TAKE 1 TABLET(250 MG) BY MOUTH TWICE DAILY 60 tablet 1     metFORMIN (GLUCOPHAGE-XR) 500 MG 24 hr tablet Take 4 tablets (2,000 mg) by mouth daily (with dinner) 120 tablet 11     metoprolol succinate ER (TOPROL XL) 100 MG 24 hr tablet Take 1 tablet (100 mg) by mouth daily . 30-day refills only. 30 tablet 11     Multiple Vitamin (DAILY-GERALD) TABS Take 1 tablet by mouth daily 90 tablet 0     nitroGLYcerin (NITROSTAT) 0.4 MG sublingual tablet Place 1 tablet (0.4 mg) under the tongue every 5 minutes as needed for chest pain . After 3 doses, if pain persists call 911. 25 tablet 3     ondansetron (ZOFRAN-ODT) 4 MG ODT tab Take 1 tablet (4 mg) by mouth every 8 hours as needed for nausea 12 tablet 0     ondansetron (ZOFRAN-ODT) 8 MG ODT tab Take 1 tablet (8 mg) by mouth every 8 hours as needed for nausea 20 tablet 1     polyethylene glycol (MIRALAX) 17 GM/Dose powder Take 17 g (1 capful) by mouth daily 507 g 1     pravastatin (PRAVACHOL) 40 MG tablet Take 1 tablet (40 mg) by mouth daily . 30-day refills only. 30 tablet 11     sennosides (SENOKOT) 8.6 MG tablet 1-2 tabs a day as needed for constipation 60 tablet 1     spironolactone (ALDACTONE) 25 MG tablet Take 1 tablet (25 mg) by mouth daily . Take one additional 0.5 tab daily as needed for weight gain. 45 day refills. 45 tablet 11     sucralfate (CARAFATE) 1 GM/10ML suspension Take 10 mLs (1 g) by mouth 4 times daily 1200 mL 1     SYMBICORT 80-4.5 MCG/ACT Inhaler        terbinafine (LAMISIL) 1 % external cream Apply topically 2 times  daily 42 g 1     traMADol (ULTRAM) 50 MG tablet Take 1 tablet (50 mg) by mouth every 8 hours as needed for severe pain 60 tablet 3     vitamin D2 (ERGOCALCIFEROL) 50882 units (1250 mcg) capsule TAKE 1 CAPSULE BY MOUTH 1 TIME A WEEK 12 capsule 0     empagliflozin (JARDIANCE) 10 MG TABS tablet Take 1 tablet (10 mg) by mouth daily (Patient not taking: No sig reported) 30 tablet 4     famotidine (PEPCID) 20 MG tablet Take 1 tablet (20 mg) by mouth 2 times daily as needed (abdominal pain) (Patient not taking: No sig reported) 20 tablet 0     fluocinolone (SYNALAR) 0.01 % solution Apply topically daily as needed (Patient not taking: No sig reported)       fluocinonide (LIDEX) 0.05 % external ointment Apply topically as needed  (Patient not taking: No sig reported)       fluticasone-vilanterol (BREO ELLIPTA) 200-25 MCG/INH inhaler Inhale 1 puff into the lungs daily (Patient not taking: No sig reported)       montelukast (SINGULAIR) 10 MG tablet Take 1 tablet (10 mg) by mouth At Bedtime (Patient not taking: No sig reported) 30 tablet 1     nitroFURantoin macrocrystal-monohydrate (MACROBID) 100 MG capsule Take 1 capsule (100 mg) by mouth 2 times daily (Patient not taking: No sig reported) 14 capsule 0     nystatin (MYCOSTATIN) 802791 UNIT/ML suspension Take 5 mLs (500,000 Units) by mouth 4 times daily (Patient not taking: No sig reported) 200 mL 0     terconazole (TERAZOL 3) 80 MG vaginal suppository Place 1 suppository (80 mg) vaginally At Bedtime (Patient not taking: No sig reported) 3 suppository 1     vitamin D3 (CHOLECALCIFEROL) 50 mcg (2000 units) tablet Take 1 tablet (50 mcg) by mouth daily (Patient not taking: No sig reported) 90 tablet 1       PAST SURGICAL HISTORY:  Past Surgical History:   Procedure Laterality Date     C/SECTION, LOW TRANSVERSE           CARDIAC SURGERY      cardiac stent- recent in 16; 4 other stents     DILATION AND CURETTAGE N/A 2016    Procedure: DILATION AND CURETTAGE;   Surgeon: Nahed Butler MD;  Location: [facility]     ESOPHAGOSCOPY, GASTROSCOPY, DUODENOSCOPY (EGD), COMBINED N/A 3/31/2022    Procedure: ESOPHAGOGASTRODUODENOSCOPY, WITH BIOPSY;  Surgeon: Enzo Sesay MD;  Location:  GI     [facility] UGI ENDOSCOPY W EUS  11/7/08    Dr. Pastrana, possible chronic pancreatitis, fatty liver     [place] [facility] [place] INTRAUTERINE DEVICE N/A 7/6/2016    Procedure: INSERT INTRAUTERINE DEVICE;  Surgeon: Nahed Butler MD;  Location: [facility] OR     INT UTERINE FIBRIOD EMBOLIZATION  10/29/2014     LAPAROSCOPIC CHOLECYSTECTOMY  5/28/08    Dr. Arnol GRUBBS TX, CERVICAL      s/p LEEP     ORBITOTOMY Right 3/15/2016    Procedure: ORBITOTOMY;  Surgeon: Myron Cyr MD;  Location:  [facility]     ORBITOTOMY Right 8/4/2017    Procedure: ORBITOTOMY;  RIGHT ORBITOTOMY AND BIOPSY;  Surgeon: Charis Holbrook MD;  Location: [facility]     REPAIR PTOSIS Right 11/17/2017    Procedure: REPAIR PTOSIS;  RIGHT UPPER LID PTOSIS REPAIR;  Surgeon: Myron Cyr MD;  Location: [facility]     UPPER GI ENDOSCOPY  10/21/08    mild gastritis, Dr. Rocky [facility] ECHO HEART XTHORACIC,COMPLETE, W/O DOPPLER  2/4/04    [facility] WNL, EF 60%       ALLERGIES     Allergies   Allergen Reactions     Amoxicillin-Pot Clavulanate      Augmentin      Unknown possible hives and edema     Azithromycin      Diatrizoate Other (See Comments)     Pt wants this listed because she is allergic to shellfish      Imitrex [Sumatriptan]      Severe face/neck/chest tightness and flushing side effects      Penicillins Hives     Unknown      Pork Allergy      Stomach pain, cramping, diarrhea, itching, nausea and headaches     Shellfish Allergy Hives and Swelling     Sulfa Drugs Hives and Swelling     Zithromax [Azithromycin Dihydrate] Swelling     Unknown        FAMILY HISTORY:  Family History   Problem Relation Age of Onset     Heart Disease Father 50        heart attack     Cerebrovascular Disease  Father      Diabetes Father      Hypertension Father      Depression Father      C.A.D. Father      Hypertension Mother      Arthritis Mother      Heart Disease Mother         long qt syndrome     Thyroid Disease Mother      C.A.D. Mother      Heart Disease Brother 15        MI at 15, 16.      Diabetes Maternal Uncle      Hypertension Maternal Aunt      Hypertension Maternal Uncle      Arthritis Brother         he passed away, had severe arthritis at age 11     Arthritis Maternal Uncle      Eye Disorder Maternal Uncle         cataracts     Neurologic Disorder Sister         migraines     Neurologic Disorder Sister         migraines     Respiratory Son         asthma     Cerebrovascular Disease Maternal Uncle      C.A.D. Brother      Family History Negative Other         neg for RA, SLE     Unknown/Adopted No family hx of         unknown neurological issues in her family, mother was adopted     Skin Cancer No family hx of         No known family hx of skin cancer       SOCIAL HISTORY:  Social History     Socioeconomic History     Marital status: Single     Spouse name: None     Number of children: 1     Years of education: None     Highest education level: None   Occupational History     Employer: NONE    Tobacco Use     Smoking status: Current Some Day Smoker     Packs/day: 0.20     Years: 1.00     Pack years: 0.20     Types: Cigarettes     Last attempt to quit: 2016     Years since quittin.4     Smokeless tobacco: Never Used   Substance and Sexual Activity     Alcohol use: No     Alcohol/week: 0.0 standard drinks     Drug use: No     Sexual activity: Not Currently   Other Topics Concern     Parent/sibling w/ CABG, MI or angioplasty before 65F 55M? Yes   Social History Narrative    Balanced Diet - sometimes    Osteoporosis Prevention Measures - Dairy servings per day: 2 servings weekly    Regular Exercise -  Yes Describe walking 4 times a week    Dental Exam - NO    Seatbelts used - Yes    Self  "Breast Exam - Yes    Abuse: Current or Past (Physical, Sexual or Emotional)- No    Do you have any concerns about STD's -  No    Do you feel safe in your environment - No    Guns stored in the home - No    Sunscreen used - Yes    Lipids -  YES - Date: 053102    Glucose -  YES - Date: 012804    Eye Exam - YES - Date: one year ago    Colon Cancer Screening - No    Hemoccults - NO    Pap Test -  YES - Date: 070904, remote history of LEEP    Mammography - YES - Date: last spring, would like to discuss, needs a referral to [facility]    DEXA - NO    Immunizations reviewed and up to date - Yes, last td given in 1997 or 1998       ROS:   Constitutional: No fever, chills, or sweats. No weight gain/loss   ENT: No visual disturbance, ear ache, epistaxis, sore throat  Allergies/Immunologic: Negative.   Respiratory: No cough, hemoptysia  Cardiovascular: As per HPI  GI: No nausea, vomiting, hematemesis, melena, or hematochezia  : No urinary frequency, dysuria, or hematuria  Integument: Negative  Psychiatric: Negative  Neuro: Negative  Endocrinology: Negative   Musculoskeletal: Negative    EXAM:  /79 (BP Location: Right arm, Patient Position: Chair, Cuff Size: Adult Regular)   Pulse 78   Ht 1.575 m (5' 2\")   Wt 78.8 kg (173 lb 11.2 oz)   SpO2 99%   BMI 31.77 kg/m    In general, the patient is a pleasant female in no apparent distress.    HEENT: NC/AT.  PERRLA.  EOMI.  Sclerae white, not injected.  Nares clear.  Pharynx without erythema or exudate.  Dentition intact.    Neck: No adenopathy.  No thyromegaly. Carotids +4/4 bilaterally without bruits.  No jugular venous distension.   Heart: RRR. Normal S1, S2 splits physiologically. No murmur, rub, click, or gallop. The PMI is in the 5th ICS in the midclavicular line. There is no heave.    Lungs: CTA.  No ronchi, wheezes, rales.  No dullness to percussion.   Abdomen: Soft, nontender, nondistended. No organomegaly.  No bruits.   Extremities: No clubbing, " cyanosis, or edema.  The pulses are +4/4 at the radial, brachial, femoral, popliteal, DP, and PT sites bilaterally.  No bruits are noted.  Neurologic: Alert and oriented to person/place/time, normal speech, gait and affect  Skin: No petechiae, purpura or rash.    Labs:  LIPID RESULTS:  Lab Results   Component Value Date    CHOL 109 02/08/2022    CHOL 102 06/18/2020    HDL 38 (L) 02/08/2022    HDL 30 (L) 06/18/2020    LDL 43 02/08/2022    LDL 50 06/18/2020    TRIG 141 02/08/2022    TRIG 104 06/18/2020    CHOLHDLRATIO 3.5 07/29/2015    NHDL 71 02/08/2022    NHDL 71 06/18/2020       LIVER ENZYME RESULTS:  Lab Results   Component Value Date    AST 22 05/14/2022    AST 22 05/14/2022    AST 20 05/28/2021    ALT 37 05/14/2022    ALT 38 05/14/2022    ALT 28 05/28/2021       CBC RESULTS:  Lab Results   Component Value Date    WBC 12.0 (H) 07/08/2022    WBC 9.0 05/28/2021    RBC 4.94 07/08/2022    RBC 4.74 05/28/2021    HGB 12.6 07/08/2022    HGB 12.5 05/28/2021    HCT 39.6 07/08/2022    HCT 36.7 05/28/2021    MCV 80 07/08/2022    MCV 77 (L) 05/28/2021    MCH 25.5 (L) 07/08/2022    MCH 26.4 (L) 05/28/2021    MCHC 31.8 07/08/2022    MCHC 34.1 05/28/2021    RDW 13.6 07/08/2022    RDW 12.8 05/28/2021     07/08/2022     05/28/2021       BMP RESULTS:  Lab Results   Component Value Date     07/08/2022     05/28/2021    POTASSIUM 3.9 07/08/2022    POTASSIUM 3.5 05/28/2021    CHLORIDE 108 07/08/2022    CHLORIDE 106 05/28/2021    CO2 25 07/08/2022    CO2 27 05/28/2021    ANIONGAP 10 07/08/2022    ANIONGAP 5 05/28/2021     (H) 07/08/2022     (H) 05/28/2021    BUN 17 07/08/2022    BUN 12 05/28/2021    CR 1.01 07/08/2022    CR 1.00 05/28/2021    GFRESTIMATED 65 07/08/2022    GFRESTIMATED 64 05/28/2021    GFRESTBLACK 74 05/28/2021    KATHY 9.3 07/08/2022    KATHY 9.4 05/28/2021        A1C RESULTS:  Lab Results   Component Value Date    A1C 11.2 (H) 05/14/2022    A1C 8.0 (H) 06/18/2020       INR  RESULTS:  Lab Results   Component Value Date    INR 0.97 08/25/2016    INR 0.98 05/20/2015           Assessment and Plan:     We discussed the results with patient.  We discussed the importance of a heart healthy diet and lifestyle.  We discussed following points with patient:      Medication Changes: None        Follow Up: In 1 year with fasting labs and echocardiogram prior    Milan Peace MD, PhD  Professor of Medicine  Division of Cardiology         CC  Patient Care Team:  Kash Solano MD as PCP - General (Family Practice)  Milan Peace MD as MD (Cardiology)  Majo Mckinnon MD as MD (Rheumatology)  Jas Vernon MD as MD (Ophthalmology)  Jas Vernon MD as Referring Physician (Ophthalmology)  Charis Holbrook MD as Resident (Ophthalmology)  Sangeetha Vasquez MD as MD (Otolaryngology)  Milan Peace MD as Assigned Heart and Vascular Provider  Majo Mckinnon MD as Assigned Rheumatology Provider  Kash Solano MD as Assigned PCP  Nilesh Peralta DO as Assigned Musculoskeletal Provider  Yessy Calzada RN as Specialty Care Coordinator (Cardiology)  Jas Vernon MD as Assigned Surgical Provider  Nilesh Blue RPH as Assigned MTM Pharmacist

## 2022-07-07 NOTE — NURSING NOTE
July 7, 2022    Medication Changes: None      Follow Up: In 1 year with fasting labs and echocardiogram prior    Patient verbalized understanding of all health information given and agreed to call with further questions or concerns.      Yessy Calzada RN

## 2022-07-08 ENCOUNTER — OFFICE VISIT (OUTPATIENT)
Dept: FAMILY MEDICINE | Facility: CLINIC | Age: 57
End: 2022-07-08
Payer: COMMERCIAL

## 2022-07-08 ENCOUNTER — LAB (OUTPATIENT)
Dept: LAB | Facility: CLINIC | Age: 57
End: 2022-07-08
Payer: COMMERCIAL

## 2022-07-08 VITALS
SYSTOLIC BLOOD PRESSURE: 114 MMHG | OXYGEN SATURATION: 98 % | HEART RATE: 78 BPM | WEIGHT: 175 LBS | DIASTOLIC BLOOD PRESSURE: 78 MMHG | BODY MASS INDEX: 32.01 KG/M2

## 2022-07-08 DIAGNOSIS — G56.01 CARPAL TUNNEL SYNDROME OF RIGHT WRIST: Primary | ICD-10-CM

## 2022-07-08 DIAGNOSIS — E11.59 HYPERTENSION ASSOCIATED WITH DIABETES (H): ICD-10-CM

## 2022-07-08 DIAGNOSIS — M54.31 BILATERAL SCIATICA: ICD-10-CM

## 2022-07-08 DIAGNOSIS — I15.2 HYPERTENSION ASSOCIATED WITH DIABETES (H): ICD-10-CM

## 2022-07-08 DIAGNOSIS — K26.3 DUODENAL ULCER, ACUTE: ICD-10-CM

## 2022-07-08 DIAGNOSIS — M54.32 BILATERAL SCIATICA: ICD-10-CM

## 2022-07-08 LAB
ANION GAP SERPL CALCULATED.3IONS-SCNC: 10 MMOL/L (ref 3–14)
BASOPHILS # BLD AUTO: 0.1 10E3/UL (ref 0–0.2)
BASOPHILS NFR BLD AUTO: 0 %
BUN SERPL-MCNC: 17 MG/DL (ref 7–30)
CALCIUM SERPL-MCNC: 9.3 MG/DL (ref 8.5–10.1)
CHLORIDE BLD-SCNC: 108 MMOL/L (ref 94–109)
CO2 SERPL-SCNC: 25 MMOL/L (ref 20–32)
CREAT SERPL-MCNC: 1.01 MG/DL (ref 0.52–1.04)
EOSINOPHIL # BLD AUTO: 0.3 10E3/UL (ref 0–0.7)
EOSINOPHIL NFR BLD AUTO: 3 %
ERYTHROCYTE [DISTWIDTH] IN BLOOD BY AUTOMATED COUNT: 13.6 % (ref 10–15)
GFR SERPL CREATININE-BSD FRML MDRD: 65 ML/MIN/1.73M2
GLUCOSE BLD-MCNC: 103 MG/DL (ref 70–99)
HCT VFR BLD AUTO: 39.6 % (ref 35–47)
HGB BLD-MCNC: 12.6 G/DL (ref 11.7–15.7)
IMM GRANULOCYTES # BLD: 0 10E3/UL
IMM GRANULOCYTES NFR BLD: 0 %
LYMPHOCYTES # BLD AUTO: 2.7 10E3/UL (ref 0.8–5.3)
LYMPHOCYTES NFR BLD AUTO: 22 %
MCH RBC QN AUTO: 25.5 PG (ref 26.5–33)
MCHC RBC AUTO-ENTMCNC: 31.8 G/DL (ref 31.5–36.5)
MCV RBC AUTO: 80 FL (ref 78–100)
MONOCYTES # BLD AUTO: 1.1 10E3/UL (ref 0–1.3)
MONOCYTES NFR BLD AUTO: 9 %
NEUTROPHILS # BLD AUTO: 7.9 10E3/UL (ref 1.6–8.3)
NEUTROPHILS NFR BLD AUTO: 66 %
NRBC # BLD AUTO: 0 10E3/UL
NRBC BLD AUTO-RTO: 0 /100
PLATELET # BLD AUTO: 350 10E3/UL (ref 150–450)
POTASSIUM BLD-SCNC: 3.9 MMOL/L (ref 3.4–5.3)
RBC # BLD AUTO: 4.94 10E6/UL (ref 3.8–5.2)
SODIUM SERPL-SCNC: 143 MMOL/L (ref 133–144)
WBC # BLD AUTO: 12 10E3/UL (ref 4–11)

## 2022-07-08 PROCEDURE — 80048 BASIC METABOLIC PNL TOTAL CA: CPT | Performed by: PATHOLOGY

## 2022-07-08 PROCEDURE — 85025 COMPLETE CBC W/AUTO DIFF WBC: CPT | Performed by: PATHOLOGY

## 2022-07-08 PROCEDURE — 99214 OFFICE O/P EST MOD 30 MIN: CPT | Performed by: FAMILY MEDICINE

## 2022-07-08 PROCEDURE — 36415 COLL VENOUS BLD VENIPUNCTURE: CPT | Performed by: PATHOLOGY

## 2022-07-08 ASSESSMENT — PAIN SCALES - GENERAL: PAINLEVEL: NO PAIN (0)

## 2022-07-08 NOTE — PROGRESS NOTES
"    Assessment & Plan     Carpal tunnel syndrome of right wrist  Try splint, see Sp Med  - MISCELLANEOUS DME SUPPLY OR ACCESSORY, NOT OTHERWISE SPECIFIED  - Orthopedic  Referral; Future    Hypertension associated with diabetes (H)  Due  - Basic metabolic panel; Future  - CBC with platelets differential; Future    Bilateral sciatica  Discuss w/ Sp Med. Given parking form.  - Orthopedic  Referral; Future    Ulcer: improved symptomatically. Check cbc. Cont PPI.      32 minutes spent on the date of the encounter doing chart review, history and exam, documentation and further activities per the note    BMI:   Estimated body mass index is 32.01 kg/m  as calculated from the following:    Height as of 7/7/22: 1.575 m (5' 2\").    Weight as of this encounter: 79.4 kg (175 lb).     Kash Solano MD  [facility] [place]    Adrienne Mehta is a 57 year old, presenting for the following health issues:  Recheck Medication      HPI Here for a few things.  One, drives, hard to cross parking lots due to her many medical issues, I do [place] parking form and give to her  Two, Gradually notes more numb R cts distribution, not weak  Three, due for cbc recheck hi wbc  Four, still on PPI, reviewed to stay on it, occasional abdomen pain better than in the spring  Five, sciatica, flares off and on, worse leg pain bilat lately  Past Medical History:   Diagnosis Date     Abnormal glandular Papanicolaou smear of cervix 1992     Allergic rhinitis     Allergy, airborne subst     Arthritis      ASCVD (arteriosclerotic cardiovascular disease)      Chronic pain      Chronic pancreatitis (H)     idiopathic, Tx: PPI, antioxidants, pancreatic enzymes     Common migraine      Coronary artery disease      Costochondritis      Difficulty in walking(719.7)      Dyspnea on exertion      Ectasia, mammary duct     [facility], persistent nipple discharge     Elevated fasting glucose      " Gastro-oesophageal reflux disease      Granulomatosis with polyangiitis (H)      Hernia      History of angina      Hyperlipidemia LDL goal < 100      Hypertension goal BP (blood pressure) < 140/90     Essential hypertension     Iron deficiency anemia      Ischemic cardiomyopathy      Menorrhagia      Migraine headaches      Mild persistent asthma      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 Matias, neg w/u, Rx'd w/prednisone     Ventral hernia, unspecified, without mention of obstruction or gangrene 2012     Past Surgical History:   Procedure Laterality Date     C/SECTION, LOW TRANSVERSE           CARDIAC SURGERY      cardiac stent- recent in 16; 4 other stents     DILATION AND CURETTAGE N/A 2016    Procedure: DILATION AND CURETTAGE;  Surgeon: Nahed Butler MD;  Location: [facility]     ESOPHAGOSCOPY, GASTROSCOPY, DUODENOSCOPY (EGD), COMBINED N/A 3/31/2022    Procedure: ESOPHAGOGASTRODUODENOSCOPY, WITH BIOPSY;  Surgeon: Enzo Sesay MD;  [facility]: [facility]     [facility] UGI ENDOSCOPY W EUS  08    Dr. Pastrana, possible chronic pancreatitis, fatty liver     HERNIA REPAIR  2012    done at [facility]     INSERT INTRAUTERINE DEVICE N/A 2016    Procedure: INSERT INTRAUTERINE DEVICE;  Surgeon: Nahed Butler MD;  Location: [facility] OR     INT  UTERINE FIBRIOD EMBOLIZATION  10/29/2014     LAPAROSCOPIC CHOLECYSTECTOMY  5/28/08    Dr. Arnol GRUBBS TX, CERVICAL      s/p LEEP     ORBITOTOMY Right 3/15/2016    Procedure: ORBITOTOMY;  Surgeon: Myron Cyr MD;  [facility]     ORBITOTOMY Right 8/4/2017    Procedure: ORBITOTOMY;  RIGHT ORBITOTOMY AND BIOPSY;  Surgeon: Charis Holbrook MD;  Location:  [facility]     REPAIR PTOSIS Right 11/17/2017    Procedure: REPAIR PTOSIS;  RIGHT UPPER LID PTOSIS REPAIR;  Surgeon: Myron Cyr MD;  Location:  [facility]     UPPER GI ENDOSCOPY  10/21/08    mild gastritis, Dr. Rocky [facility] ECHO HEART XTHORACIC,COMPLETE, W/O DOPPLER  2/4/04    [facility] WNL, EF 60%     Current Outpatient Medications   Medication     acetaminophen (TYLENOL) 325 MG tablet     albuterol (2.5 MG/3ML) 0.083% neb solution     albuterol (PROAIR HFA, PROVENTIL HFA, VENTOLIN HFA) 108 (90 BASE) MCG/ACT inhaler     blood glucose (NO BRAND SPECIFIED) lancets standard     blood glucose (NO BRAND SPECIFIED) test strip     blood glucose monitoring (NO BRAND SPECIFIED) meter device kit     blood glucose monitoring (NO BRAND SPECIFIED) test strip     Blood Pressure Monitor KIT     calcium carbonate (TUMS) 500 MG chewable tablet     clopidogrel (PLAVIX) 75 MG tablet     COMPRESSION STOCKINGS     cyclobenzaprine (FLEXERIL) 10 MG tablet     dicyclomine (BENTYL) 20 MG tablet     diphenhydrAMINE (BENADRYL) 25 MG capsule     EPIPEN 2-RIKY 0.3 MG/0.3ML injection     Ergocalciferol (VITAMIN D2) 50 MCG (2000 UT) TABS     esomeprazole (NEXIUM) 40 MG DR capsule     fexofenadine (ALLEGRA) 180 MG tablet     fluconazole (DIFLUCAN) 200 MG tablet     folic acid (FOLVITE) 1 MG tablet     insulin glargine (LANTUS PEN) 100 UNIT/ML pen     insulin pen needle (BD MARCK U/F) 32G X 4 MM miscellaneous     ketoconazole (NIZORAL) 2 % shampoo     lisinopril-hydrochlorothiazide (ZESTORETIC) 20-25 MG tablet     magnesium 250 MG tablet     metFORMIN (GLUCOPHAGE-XR) 500  MG 24 hr tablet     metoprolol succinate ER (TOPROL XL) 100 MG 24 hr tablet     Multiple Vitamin (DAILY-GERALD) TABS     nitroGLYcerin (NITROSTAT) 0.4 MG sublingual tablet     ondansetron (ZOFRAN-ODT) 4 MG ODT tab     ondansetron (ZOFRAN-ODT) 8 MG ODT tab     polyethylene glycol (MIRALAX) 17 GM/Dose powder     pravastatin (PRAVACHOL) 40 MG tablet     sennosides (SENOKOT) 8.6 MG tablet     spironolactone (ALDACTONE) 25 MG tablet     sucralfate (CARAFATE) 1 GM/10ML suspension     SYMBICORT 80-4.5 MCG/ACT Inhaler     terbinafine (LAMISIL) 1 % external cream     traMADol (ULTRAM) 50 MG tablet     vitamin D2 (ERGOCALCIFEROL) 16831 units (1250 mcg) capsule     empagliflozin (JARDIANCE) 10 MG TABS tablet     famotidine (PEPCID) 20 MG tablet     fluocinolone (SYNALAR) 0.01 % solution     fluocinonide (LIDEX) 0.05 % external ointment     fluticasone-vilanterol (BREO ELLIPTA) 200-25 MCG/INH inhaler     montelukast (SINGULAIR) 10 MG tablet     nitroFURantoin macrocrystal-monohydrate (MACROBID) 100 MG capsule     nystatin (MYCOSTATIN) 012738 UNIT/ML suspension     terconazole (TERAZOL 3) 80 MG vaginal suppository     vitamin D3 (CHOLECALCIFEROL) 50 mcg (2000 units) tablet     No current facility-administered medications for this visit.     Allergies   Allergen Reactions     Amoxicillin-Pot Clavulanate      Augmentin      Unknown possible hives and edema     Azithromycin      Diatrizoate Other (See Comments)     Pt wants this listed because she is allergic to shellfish      Imitrex [Sumatriptan]      Severe face/neck/chest tightness and flushing side effects      Penicillins Hives     Unknown      Pork Allergy      Stomach pain, cramping, diarrhea, itching, nausea and headaches     Shellfish Allergy Hives and Swelling     Sulfa Drugs Hives and Swelling     Zithromax [Azithromycin Dihydrate] Swelling     Unknown        Review of Systems         Objective    /78 (BP Location: Right arm, Patient Position: Sitting, Cuff Size:  Adult Regular)   Pulse 78   Wt 79.4 kg (175 lb)   SpO2 98%   BMI 32.01 kg/m    Body mass index is 32.01 kg/m .  Physical Exam   GENERAL: healthy, alert and no distress  ABDOMEN: soft, nontender, no hepatosplenomegaly, no masses and bowel sounds normal  MS: no gross musculoskeletal defects noted, no edema                .  ..

## 2022-07-08 NOTE — NURSING NOTE
Chief Complaint   Patient presents with     Recheck Medication     Blood pressure 114/78, pulse 78, weight 79.4 kg (175 lb), SpO2 98 %, not currently breastfeeding.    Luis Galindo on 7/8/2022 at 2:55 PM

## 2022-07-16 NOTE — PROGRESS NOTES
HPI:     I had the privilege to evaluate and examine during a phone  Ms Janine Cornell, who is a 56 yr -American patient with DM2, Hypothyroidism, PCOS, Dyslipidemia, HTN, and CAD S/P NSTEMI s/p KATHERINE to the mLAD in 2011 and RCA in 2016      Patient has a positive RA factor and fibromyalgia and follows with Dr Rodriguez for the effect of plaquenil on her retina.     Since the COVID19 period started,patient has been more sedentary.  Patient reports that she has chest discomfort, but this is relatively stable.  She feels relatively tired - however her complaints have been relatively stable. Her BP has been higher as before resulting in less energy. She explains that during COVID19 period - she has been much sedentary.  She denies palpitations, intermittent claudication. She has many chronic symptoms that appear to be stable at this time.       PAST MEDICAL HISTORY:  Past Medical History:   Diagnosis Date     Abnormal glandular Papanicolaou smear of cervix 1992     Allergic rhinitis     Allergy, airborne subst     Arthritis      ASCVD (arteriosclerotic cardiovascular disease)      Chronic pain      Chronic pancreatitis (H)     idiopathic, Tx: PPI, antioxidants, pancreatic enzymes     Common migraine      Coronary artery disease      Costochondritis      Difficulty in walking(719.7)      Dyspnea on exertion      Ectasia, mammary duct     [facility], persistent nipple discharge     Elevated fasting glucose      Gastro-oesophageal reflux disease      Granulomatosis with polyangiitis (H)      Hernia      History of angina      Hyperlipidemia LDL goal < 100      Hypertension goal BP (blood pressure) < 140/90     Essential hypertension     Iron deficiency anemia      Ischemic cardiomyopathy      Menorrhagia      Migraine headaches      Mild persistent asthma      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 Matias, neg w/u, Rx'd w/prednisone     Ventral hernia, unspecified, without mention of obstruction or gangrene 07/2012       CURRENT MEDICATIONS:  Current Outpatient Medications   Medication Sig Dispense Refill     acetaminophen (TYLENOL) 325 MG tablet Take 1-2 tablets (325-650 mg) by mouth every 6 hours as needed for pain (headache) 250 tablet 4     albuterol (2.5 MG/3ML) 0.083% neb solution INL 1 VIAL VIA NEBULIZATION Q 4 TO 6 HOURS PRN  1     albuterol (PROAIR HFA, PROVENTIL HFA, VENTOLIN HFA) 108 (90 BASE) MCG/ACT inhaler Inhale 2 puffs into the lungs every 6 hours as needed for shortness of breath / dyspnea or wheezing 3 Inhaler 1     blood glucose (NO BRAND SPECIFIED) lancets standard Use to test blood sugar 1-4 times daily or as directed. 400 each 3     blood glucose (NO BRAND SPECIFIED) test strip Use to test blood sugar 1-4 times daily or as directed. 400 strip 3     blood glucose monitoring (NO BRAND SPECIFIED) meter device kit Use to test blood sugar 4 X times daily or as directed. (Patient taking differently: 1 kit Use to test blood sugar 4 X times daily or as directed.) 1 kit 0     blood glucose monitoring (NO BRAND SPECIFIED) test strip 1 strip by In Vitro route 4 times daily Test as directed. Please dispense three months and three months of refills. Thank you. (Patient taking differently: 1 strip by In Vitro route 4 times daily Test as directed.  Please dispense three months and three months of refills. Thank you.) 360 each 3     Blood Pressure Monitor KIT 1 each daily Monitor home blood pressure as instructed by physician.  Dispense [facility] blood pressure kit. 1 kit 0     calcium carbonate (TUMS) 500 MG chewable tablet Take 3-4 tablets (1,500-2,000 mg) by mouth daily as needed 90 tablet 3     clopidogrel (PLAVIX) 75 MG tablet Take 1 tablet (75 mg) by mouth daily . 30-day refills only. 30 tablet 11     COMPRESSION STOCKINGS 2 each daily Apply one 10-15 mmHg compression stocking to each lower extgmierty during the day and remove before bedtime. 4 each 2     cyclobenzaprine (FLEXERIL) 10 MG tablet Take 1 tablet (10 mg) by mouth 2 times daily as needed for muscle spasms 60 tablet 3     dicyclomine (BENTYL) 20 MG tablet TAKE 1 TABLET(20 MG) BY MOUTH FOUR TIMES DAILY AS NEEDED. Pls schedule clinic appt for refills. 60 tablet 0     diphenhydrAMINE (BENADRYL) 25 MG capsule Take 1 capsule (25 mg) by mouth nightly as needed for itching or allergies 30 capsule 2     EPIPEN 2-RIKY 0.3 MG/0.3ML injection INJECT 0.3 MG INTO THE MUSCLE PRF ANAPHYALAXIS  0     Ergocalciferol (VITAMIN D2) 50 MCG (2000 UT) TABS Take 2,000 Units by mouth daily 90 tablet 1     esomeprazole (NEXIUM) 40 MG DR capsule Take 1 capsule (40 mg) by mouth 2 times daily Take 30-60 minutes before eating. 180 capsule 0     fexofenadine (ALLEGRA) 180 MG tablet Take 1 tablet by mouth daily as needed. 90 tablet 3     fluconazole (DIFLUCAN) 200 MG tablet Take 1 tablet (200 mg) by mouth daily 14 tablet 0     folic acid (FOLVITE) 1 MG tablet Take 1 tablet (1 mg) by mouth daily 90 tablet 2     insulin glargine (LANTUS PEN) 100 UNIT/ML pen Inject 75 Units Subcutaneous every morning Or as directed. 75 mL 3     insulin pen needle (BD MARCK U/F) 32G X 4 MM miscellaneous USE DAILY OR AS DIRECTED 300 each 3     ketoconazole (NIZORAL) 2 % shampoo Apply topically daily as needed       lisinopril-hydrochlorothiazide  (ZESTORETIC) 20-25 MG tablet Take 1 tablet by mouth daily . 30-day refills only. 30 tablet 11     magnesium 250 MG tablet TAKE 1 TABLET(250 MG) BY MOUTH TWICE DAILY 60 tablet 1     metFORMIN (GLUCOPHAGE-XR) 500 MG 24 hr tablet Take 4 tablets (2,000 mg) by mouth daily (with dinner) 120 tablet 11     metoprolol succinate ER (TOPROL XL) 100 MG 24 hr tablet Take 1 tablet (100 mg) by mouth daily . 30-day refills only. 30 tablet 11     Multiple Vitamin (DAILY-GERALD) TABS Take 1 tablet by mouth daily 90 tablet 0     nitroGLYcerin (NITROSTAT) 0.4 MG sublingual tablet Place 1 tablet (0.4 mg) under the tongue every 5 minutes as needed for chest pain . After 3 doses, if pain persists call 911. 25 tablet 3     ondansetron (ZOFRAN-ODT) 4 MG ODT tab Take 1 tablet (4 mg) by mouth every 8 hours as needed for nausea 12 tablet 0     ondansetron (ZOFRAN-ODT) 8 MG ODT tab Take 1 tablet (8 mg) by mouth every 8 hours as needed for nausea 20 tablet 1     polyethylene glycol (MIRALAX) 17 GM/Dose powder Take 17 g (1 capful) by mouth daily 507 g 1     pravastatin (PRAVACHOL) 40 MG tablet Take 1 tablet (40 mg) by mouth daily . 30-day refills only. 30 tablet 11     sennosides (SENOKOT) 8.6 MG tablet 1-2 tabs a day as needed for constipation 60 tablet 1     spironolactone (ALDACTONE) 25 MG tablet Take 1 tablet (25 mg) by mouth daily . Take one additional 0.5 tab daily as needed for weight gain. 45 day refills. 45 tablet 11     sucralfate (CARAFATE) 1 GM/10ML suspension Take 10 mLs (1 g) by mouth 4 times daily 1200 mL 1     SYMBICORT 80-4.5 MCG/ACT Inhaler        terbinafine (LAMISIL) 1 % external cream Apply topically 2 times daily 42 g 1     traMADol (ULTRAM) 50 MG tablet Take 1 tablet (50 mg) by mouth every 8 hours as needed for severe pain 60 tablet 3     vitamin D2 (ERGOCALCIFEROL) 28499 units (1250 mcg) capsule TAKE 1 CAPSULE BY MOUTH 1 TIME A WEEK 12 capsule 0     empagliflozin (JARDIANCE) 10 MG TABS tablet Take 1 tablet (10 mg) by mouth  daily (Patient not taking: No sig reported) 30 tablet 4     famotidine (PEPCID) 20 MG tablet Take 1 tablet (20 mg) by mouth 2 times daily as needed (abdominal pain) (Patient not taking: No sig reported) 20 tablet 0     fluocinolone (SYNALAR) 0.01 % solution Apply topically daily as needed (Patient not taking: No sig reported)       fluocinonide (LIDEX) 0.05 % external ointment Apply topically as needed  (Patient not taking: No sig reported)       fluticasone-vilanterol (BREO ELLIPTA) 200-25 MCG/INH inhaler Inhale 1 puff into the lungs daily (Patient not taking: No sig reported)       montelukast (SINGULAIR) 10 MG tablet Take 1 tablet (10 mg) by mouth At Bedtime (Patient not taking: No sig reported) 30 tablet 1     nitroFURantoin macrocrystal-monohydrate (MACROBID) 100 MG capsule Take 1 capsule (100 mg) by mouth 2 times daily (Patient not taking: No sig reported) 14 capsule 0     nystatin (MYCOSTATIN) 101669 UNIT/ML suspension Take 5 mLs (500,000 Units) by mouth 4 times daily (Patient not taking: No sig reported) 200 mL 0     terconazole (TERAZOL 3) 80 MG vaginal suppository Place 1 suppository (80 mg) vaginally At Bedtime (Patient not taking: No sig reported) 3 suppository 1     vitamin D3 (CHOLECALCIFEROL) 50 mcg (2000 units) tablet Take 1 tablet (50 mcg) by mouth daily (Patient not taking: No sig reported) 90 tablet 1       PAST SURGICAL HISTORY:  Past Surgical History:   Procedure Laterality Date     C/SECTION, LOW TRANSVERSE           CARDIAC SURGERY      cardiac stent- recent in 16; 4 other stents     DILATION AND CURETTAGE N/A 2016    Procedure: DILATION AND CURETTAGE;  Surgeon: Nahed Butler MD;  Location: [facility]     ESOPHAGOSCOPY, GASTROSCOPY, DUODENOSCOPY (EGD), COMBINED N/A 3/31/2022    Procedure: ESOPHAGOGASTRODUODENOSCOPY, WITH BIOPSY;  Surgeon: Enzo Sesay MD;  Location:  GI     [facility] UGI ENDOSCOPY W EUS  08    Dr. Pastrana, possible chronic pancreatitis, fatty  liver     HERNIA REPAIR  2012    done at [facility]     INSERT INTRAUTERINE DEVICE N/A 2016    Procedure: INSERT INTRAUTERINE DEVICE;  Surgeon: Nahed Butler MD;  Location: [facility] OR     INT UTERINE FIBRIOD EMBOLIZATION  10/29/2014     LAPAROSCOPIC CHOLECYSTECTOMY  08    Dr. Arnol GRUBBS TX, CERVICAL      s/p LEEP     ORBITOTOMY Right 3/15/2016    Procedure: ORBITOTOMY;  Surgeon: Myron Cyr MD;  Location:  [facility]     ORBITOTOMY Right 2017    Procedure: ORBITOTOMY;  RIGHT ORBITOTOMY AND BIOPSY;  Surgeon: Charis Holbrook MD;  Location: [facility]     REPAIR PTOSIS Right 2017    Procedure: REPAIR PTOSIS;  RIGHT UPPER LID PTOSIS REPAIR;  Surgeon: Myron Cyr MD;  Location: [facility]     UPPER GI ENDOSCOPY  10/21/08    mild gastritis, Dr. Rocky [facility] ECHO HEART XTHORACIC,COMPLETE, W/O DOPPLER  04    [facility] WNL, EF 60%       ALLERGIES     Allergies   Allergen Reactions     Amoxicillin-Pot Clavulanate      Augmentin      Unknown possible hives and edema     Azithromycin      Diatrizoate Other (See Comments)     Pt wants this listed because she is allergic to shellfish      Imitrex [Sumatriptan]      Severe face/neck/chest tightness and flushing side effects      Penicillins Hives     Unknown      Pork Allergy      Stomach pain, cramping, diarrhea, itching, nausea and headaches     Shellfish Allergy Hives and Swelling     Sulfa Drugs Hives and Swelling     Zithromax [Azithromycin Dihydrate] Swelling     Unknown        FAMILY HISTORY:  Family History   Problem Relation Age of Onset     Heart Disease Father 50        heart attack     Cerebrovascular Disease Father      Diabetes Father      Hypertension Father      Depression Father      C.A.D. Father      Hypertension Mother      Arthritis Mother      Heart Disease Mother         long qt syndrome     Thyroid Disease Mother      C.A.D. Mother      Heart Disease Brother 15        MI at 15, 16.      Diabetes Maternal  Uncle      Hypertension Maternal Aunt      Hypertension Maternal Uncle      Arthritis Brother         he passed away, had severe arthritis at age 11     Arthritis Maternal Uncle      Eye Disorder Maternal Uncle         cataracts     Neurologic Disorder Sister         migraines     Neurologic Disorder Sister         migraines     Respiratory Son         asthma     Cerebrovascular Disease Maternal Uncle      C.A.D. Brother      Family History Negative Other         neg for RA, SLE     Unknown/Adopted No family hx of         unknown neurological issues in her family, mother was adopted     Skin Cancer No family hx of         No known family hx of skin cancer       SOCIAL HISTORY:  Social History     Socioeconomic History     Marital status: Single     Spouse name: None     Number of children: 1     Years of education: None     Highest education level: None   Occupational History     Employer: NONE    Tobacco Use     Smoking status: Current Some Day Smoker     Packs/day: 0.20     Years: 1.00     Pack years: 0.20     Types: Cigarettes     Last attempt to quit: 2016     Years since quittin.4     Smokeless tobacco: Never Used   Substance and Sexual Activity     Alcohol use: No     Alcohol/week: 0.0 standard drinks     Drug use: No     Sexual activity: Not Currently   Other Topics Concern     Parent/sibling w/ CABG, MI or angioplasty before 65F 55M? Yes   Social History Narrative    Balanced Diet - sometimes    Osteoporosis Prevention Measures - Dairy servings per day: 2 servings weekly    Regular Exercise -  Yes Describe walking 4 times a week    Dental Exam - NO    Seatbelts used - Yes    Self Breast Exam - Yes    Abuse: Current or Past (Physical, Sexual or Emotional)- No    Do you have any concerns about STD's -  No    Do you feel safe in your environment - No    Guns stored in the home - No    Sunscreen used - Yes    Lipids -  YES - Date:     Glucose -  YES - Date:     Eye Exam - YES - Date: one year  "ago    Colon Cancer Screening - No    Hemoccults - NO    Pap Test -  YES - Date: 070904, remote history of LEEP    Mammography - YES - Date: last spring, would like to discuss, needs [facility]    DEXA - NO    Immunizations reviewed and up to date - Yes, last td given in 1997 or 1998       ROS:   Constitutional: No fever, chills, or sweats. No weight gain/loss   ENT: No visual disturbance, ear ache, epistaxis, sore throat  Allergies/Immunologic: Negative.   Respiratory: No cough, hemoptysia  Cardiovascular: As per HPI  GI: No nausea, vomiting, hematemesis, melena, or hematochezia  : No urinary frequency, dysuria, or hematuria  Integument: Negative  Psychiatric: Negative  Neuro: Negative  Endocrinology: Negative   Musculoskeletal: Negative    EXAM:  /79 (BP Location: Right arm, Patient Position: Chair, Cuff Size: Adult Regular)   Pulse 78   Ht 1.575 m (5' 2\")   Wt 78.8 kg (173 lb 11.2 oz)   SpO2 99%   BMI 31.77 kg/m    In general, the patient is a pleasant female in no apparent distress.    HEENT: NC/AT.  PERRLA.  EOMI.  Sclerae white, not injected.  Nares clear.  Pharynx without erythema or exudate.  Dentition intact.    Neck: No adenopathy.  No thyromegaly. Carotids +4/4 bilaterally without bruits.  No jugular venous distension.   Heart: RRR. Normal S1, S2 splits physiologically. No murmur, rub, click, or gallop. The PMI is in the 5th ICS in the midclavicular line. There is no heave.    Lungs: CTA.  No ronchi, wheezes, rales.  No dullness to percussion.   Abdomen: Soft, nontender, nondistended. No organomegaly.  No bruits.   Extremities: No clubbing, cyanosis, or edema.  The pulses are +4/4 at the radial, brachial, femoral, popliteal, DP, and PT sites bilaterally.  No bruits are noted.  Neurologic: Alert and oriented to person/place/time, normal speech, gait and affect  Skin: No petechiae, purpura or rash.    Labs:  LIPID RESULTS:  Lab Results   Component Value Date    " CHOL 109 02/08/2022    CHOL 102 06/18/2020    HDL 38 (L) 02/08/2022    HDL 30 (L) 06/18/2020    LDL 43 02/08/2022    LDL 50 06/18/2020    TRIG 141 02/08/2022    TRIG 104 06/18/2020    CHOLHDLRATIO 3.5 07/29/2015    NHDL 71 02/08/2022    NHDL 71 06/18/2020       LIVER ENZYME RESULTS:  Lab Results   Component Value Date    AST 22 05/14/2022    AST 22 05/14/2022    AST 20 05/28/2021    ALT 37 05/14/2022    ALT 38 05/14/2022    ALT 28 05/28/2021       CBC RESULTS:  Lab Results   Component Value Date    WBC 12.0 (H) 07/08/2022    WBC 9.0 05/28/2021    RBC 4.94 07/08/2022    RBC 4.74 05/28/2021    HGB 12.6 07/08/2022    HGB 12.5 05/28/2021    HCT 39.6 07/08/2022    HCT 36.7 05/28/2021    MCV 80 07/08/2022    MCV 77 (L) 05/28/2021    MCH 25.5 (L) 07/08/2022    MCH 26.4 (L) 05/28/2021    MCHC 31.8 07/08/2022    MCHC 34.1 05/28/2021    RDW 13.6 07/08/2022    RDW 12.8 05/28/2021     07/08/2022     05/28/2021       BMP RESULTS:  Lab Results   Component Value Date     07/08/2022     05/28/2021    POTASSIUM 3.9 07/08/2022    POTASSIUM 3.5 05/28/2021    CHLORIDE 108 07/08/2022    CHLORIDE 106 05/28/2021    CO2 25 07/08/2022    CO2 27 05/28/2021    ANIONGAP 10 07/08/2022    ANIONGAP 5 05/28/2021     (H) 07/08/2022     (H) 05/28/2021    BUN 17 07/08/2022    BUN 12 05/28/2021    CR 1.01 07/08/2022    CR 1.00 05/28/2021    GFRESTIMATED 65 07/08/2022    GFRESTIMATED 64 05/28/2021    GFRESTBLACK 74 05/28/2021    KATHY 9.3 07/08/2022    KATHY 9.4 05/28/2021        A1C RESULTS:  Lab Results   Component Value Date    A1C 11.2 (H) 05/14/2022    A1C 8.0 (H) 06/18/2020       INR RESULTS:  Lab Results   Component Value Date    INR 0.97 08/25/2016    INR 0.98 05/20/2015           Assessment and Plan:     We discussed the results with patient.  We discussed the importance of a heart healthy diet and lifestyle.  We discussed following points with patient:      Medication Changes: None        Follow Up: In 1  year with fasting labs and echocardiogram prior    Milan Peace MD, PhD  Professor of Medicine  Division of Cardiology         CC  Patient Care Team:  Kash Solano MD as PCP - General (Family Practice)  Milan Peace MD as MD (Cardiology)  Majo Mckinnon MD as MD (Rheumatology)  Jas Vernon MD as MD (Ophthalmology)  Jas Vernon MD as Referring Physician (Ophthalmology)  Charis Holbrook MD as Resident (Ophthalmology)  Sangeetha Vasquez MD as MD (Otolaryngology)  Milan Peace MD as Assigned Heart and Vascular Provider  Majo Mckinnon MD as Assigned Rheumatology Provider  Kash Solano MD as Assigned PCP  Nilesh Peralta DO as Assigned Musculoskeletal Provider  Yessy Calzada RN as Specialty Care Coordinator (Cardiology)  Jas Vernon MD as Assigned Surgical Provider  Nilesh lBue [facility] as Assigned MTM Pharmacist  MILAN PEACE

## 2022-07-27 DIAGNOSIS — I10 BENIGN ESSENTIAL HYPERTENSION: ICD-10-CM

## 2022-07-27 DIAGNOSIS — E55.9 VITAMIN D DEFICIENCY: ICD-10-CM

## 2022-07-27 DIAGNOSIS — M19.90 INFLAMMATORY ARTHRITIS: ICD-10-CM

## 2022-07-27 DIAGNOSIS — I10 HTN (HYPERTENSION): ICD-10-CM

## 2022-07-27 DIAGNOSIS — I10 HYPERTENSION, UNSPECIFIED TYPE: ICD-10-CM

## 2022-07-27 DIAGNOSIS — E78.5 HYPERLIPIDEMIA LDL GOAL <70: ICD-10-CM

## 2022-07-27 DIAGNOSIS — I25.10 CORONARY ARTERY DISEASE INVOLVING NATIVE HEART WITHOUT ANGINA PECTORIS, UNSPECIFIED VESSEL OR LESION TYPE: ICD-10-CM

## 2022-07-27 RX ORDER — PRAVASTATIN SODIUM 40 MG
TABLET ORAL
Qty: 30 TABLET | Refills: 11 | OUTPATIENT
Start: 2022-07-27

## 2022-07-27 RX ORDER — ASPIRIN 81 MG/1
TABLET, COATED ORAL
Qty: 30 TABLET | Refills: 11 | OUTPATIENT
Start: 2022-07-27

## 2022-07-27 RX ORDER — CLOPIDOGREL BISULFATE 75 MG/1
TABLET ORAL
Qty: 30 TABLET | Refills: 11 | OUTPATIENT
Start: 2022-07-27

## 2022-07-27 RX ORDER — METOPROLOL SUCCINATE 100 MG/1
TABLET, EXTENDED RELEASE ORAL
Qty: 30 TABLET | Refills: 11 | OUTPATIENT
Start: 2022-07-27

## 2022-07-28 NOTE — TELEPHONE ENCOUNTER
VITAMIN D2 50,000IU (ERGO) CAP RX  Last Written Prescription Date:   3/7/2022  Last Fill Quantity: 12,   # refills: 0  Last Office Visit :  4/22/2022  Future Office visit:   8/19/2022  Routing refill request to provider for review/approval because:  Drug not on the [facility], [facility] or [facility] refill protocol or controlled substance    MAGNESIUM 250MG TABLETS  Last Written Prescription Date:   2/1/2022  Last Fill Quantity: 60,   # refills: 1  Last Office Visit :  4/22/2022  Future Office visit:   8/19/2022  Routing refill request to provider for review/approval because:  Drug not on the [facility], [facility] or [facility] refill protocol or controlled substance      DAILY-GERALD TABLETS  Last Written Prescription Date:   5/10/2021  Last Fill Quantity: 90,   # refills: 0  Last Office Visit :  4/22/2022  Future Office visit:   8/19/2022  Routing refill request to provider for review/approval because:  Drug not on the [facility], [facility] or [facility] refill protocol or controlled substance      Georgia Mercedes RN  Central Triage Red Flags/Med Refills

## 2022-08-01 RX ORDER — ERGOCALCIFEROL 1.25 MG/1
50000 CAPSULE, LIQUID FILLED ORAL WEEKLY
Qty: 4 CAPSULE | Refills: 0 | Status: SHIPPED | OUTPATIENT
Start: 2022-08-01 | End: 2022-08-19

## 2022-08-01 RX ORDER — MULTIVITAMIN WITH IRON
1 TABLET ORAL 2 TIMES DAILY
Qty: 60 TABLET | Refills: 0 | Status: SHIPPED | OUTPATIENT
Start: 2022-08-01 | End: 2022-09-01

## 2022-08-01 RX ORDER — MULTIVITAMIN WITH FOLIC ACID 400 MCG
1 TABLET ORAL DAILY
Qty: 90 TABLET | Refills: 1 | Status: SHIPPED | OUTPATIENT
Start: 2022-08-01 | End: 2022-08-19

## 2022-08-08 ENCOUNTER — TELEPHONE (OUTPATIENT)
Dept: PHARMACY | Facility: CLINIC | Age: 57
End: 2022-08-08

## 2022-08-08 NOTE — TELEPHONE ENCOUNTER
PHARMACY TELEPHONE ENCOUNTER:     Reason: Medication questions.      Reports Zofran has not been helpful for her recently.   Reports that her blood sugars have been averaging about 100-140s in the mornings. Reports that she has had a yeast infection    Will reach out to Dr. Solano about Reglan.      Nilesh Blue, Pharm. D.   Medication Therapy Management Pharmacist

## 2022-08-19 ENCOUNTER — LAB (OUTPATIENT)
Dept: LAB | Facility: CLINIC | Age: 57
End: 2022-08-19
Payer: COMMERCIAL

## 2022-08-19 ENCOUNTER — OFFICE VISIT (OUTPATIENT)
Dept: FAMILY MEDICINE | Facility: CLINIC | Age: 57
End: 2022-08-19
Payer: COMMERCIAL

## 2022-08-19 ENCOUNTER — OFFICE VISIT (OUTPATIENT)
Dept: PHARMACY | Facility: CLINIC | Age: 57
End: 2022-08-19
Payer: COMMERCIAL

## 2022-08-19 ENCOUNTER — OFFICE VISIT (OUTPATIENT)
Dept: RHEUMATOLOGY | Facility: CLINIC | Age: 57
End: 2022-08-19
Attending: INTERNAL MEDICINE
Payer: COMMERCIAL

## 2022-08-19 VITALS
WEIGHT: 176 LBS | HEIGHT: 62 IN | SYSTOLIC BLOOD PRESSURE: 106 MMHG | TEMPERATURE: 98.3 F | DIASTOLIC BLOOD PRESSURE: 72 MMHG | RESPIRATION RATE: 18 BRPM | HEART RATE: 72 BPM | OXYGEN SATURATION: 99 % | BODY MASS INDEX: 32.39 KG/M2

## 2022-08-19 VITALS
BODY MASS INDEX: 32.17 KG/M2 | DIASTOLIC BLOOD PRESSURE: 67 MMHG | SYSTOLIC BLOOD PRESSURE: 93 MMHG | OXYGEN SATURATION: 97 % | HEART RATE: 69 BPM | WEIGHT: 175.9 LBS

## 2022-08-19 DIAGNOSIS — E11.9 TYPE 2 DIABETES, HBA1C GOAL < 8% (H): ICD-10-CM

## 2022-08-19 DIAGNOSIS — E11.9 TYPE 2 DIABETES, HBA1C GOAL < 7% (H): Primary | ICD-10-CM

## 2022-08-19 DIAGNOSIS — I77.82 ANCA-ASSOCIATED VASCULITIS (H): ICD-10-CM

## 2022-08-19 DIAGNOSIS — Z51.81 ENCOUNTER FOR MEDICATION MONITORING: ICD-10-CM

## 2022-08-19 DIAGNOSIS — M54.50 LOW BACK PAIN OF OVER 3 MONTHS DURATION: ICD-10-CM

## 2022-08-19 DIAGNOSIS — M25.50 MULTIPLE JOINT PAIN: ICD-10-CM

## 2022-08-19 DIAGNOSIS — M54.2 NECK PAIN: ICD-10-CM

## 2022-08-19 DIAGNOSIS — R11.0 NAUSEA: ICD-10-CM

## 2022-08-19 DIAGNOSIS — M31.30 GRANULOMATOSIS WITH POLYANGIITIS WITHOUT RENAL INVOLVEMENT (H): ICD-10-CM

## 2022-08-19 DIAGNOSIS — E55.9 VITAMIN D DEFICIENCY: Primary | ICD-10-CM

## 2022-08-19 DIAGNOSIS — K26.9 DUODENAL ULCER WITHOUT HEMORRHAGE OR PERFORATION AND WITHOUT OBSTRUCTION: ICD-10-CM

## 2022-08-19 DIAGNOSIS — E11.9 TYPE 2 DIABETES, HBA1C GOAL < 8% (H): Primary | ICD-10-CM

## 2022-08-19 DIAGNOSIS — E55.9 VITAMIN D DEFICIENCY: ICD-10-CM

## 2022-08-19 DIAGNOSIS — M79.10 MYALGIA: ICD-10-CM

## 2022-08-19 DIAGNOSIS — R51.9 NONINTRACTABLE HEADACHE, UNSPECIFIED CHRONICITY PATTERN, UNSPECIFIED HEADACHE TYPE: ICD-10-CM

## 2022-08-19 DIAGNOSIS — M19.90 INFLAMMATORY ARTHRITIS: ICD-10-CM

## 2022-08-19 LAB
ALBUMIN SERPL-MCNC: 4.2 G/DL (ref 3.4–5)
ALBUMIN UR-MCNC: NEGATIVE MG/DL
ALT SERPL W P-5'-P-CCNC: 34 U/L (ref 0–50)
APPEARANCE UR: CLEAR
AST SERPL W P-5'-P-CCNC: 16 U/L (ref 0–45)
BASOPHILS # BLD AUTO: 0.1 10E3/UL (ref 0–0.2)
BASOPHILS NFR BLD AUTO: 0 %
BILIRUB UR QL STRIP: NEGATIVE
CD19 CELLS # BLD: <1 CELLS/UL (ref 107–698)
CD19 CELLS NFR BLD: <1 % (ref 6–27)
COLOR UR AUTO: ABNORMAL
CREAT SERPL-MCNC: 1.24 MG/DL (ref 0.52–1.04)
CREAT UR-MCNC: 128 MG/DL
CRP SERPL-MCNC: 9.1 MG/L (ref 0–8)
DEPRECATED CALCIDIOL+CALCIFEROL SERPL-MC: 36 UG/L (ref 20–75)
EOSINOPHIL # BLD AUTO: 0.4 10E3/UL (ref 0–0.7)
EOSINOPHIL NFR BLD AUTO: 3 %
ERYTHROCYTE [DISTWIDTH] IN BLOOD BY AUTOMATED COUNT: 14.1 % (ref 10–15)
ERYTHROCYTE [SEDIMENTATION RATE] IN BLOOD BY WESTERGREN METHOD: 15 MM/HR (ref 0–30)
GFR SERPL CREATININE-BSD FRML MDRD: 51 ML/MIN/1.73M2
GLUCOSE UR STRIP-MCNC: 1000 MG/DL
HBA1C MFR BLD: 10.3 % (ref 0–5.6)
HCT VFR BLD AUTO: 40.4 % (ref 35–47)
HGB BLD-MCNC: 12.8 G/DL (ref 11.7–15.7)
HGB UR QL STRIP: NEGATIVE
IMM GRANULOCYTES # BLD: 0.1 10E3/UL
IMM GRANULOCYTES NFR BLD: 0 %
KETONES UR STRIP-MCNC: NEGATIVE MG/DL
LEUKOCYTE ESTERASE UR QL STRIP: NEGATIVE
LYMPHOCYTES # BLD AUTO: 2.5 10E3/UL (ref 0.8–5.3)
LYMPHOCYTES NFR BLD AUTO: 20 %
MCH RBC QN AUTO: 25.3 PG (ref 26.5–33)
MCHC RBC AUTO-ENTMCNC: 31.7 G/DL (ref 31.5–36.5)
MCV RBC AUTO: 80 FL (ref 78–100)
MONOCYTES # BLD AUTO: 0.8 10E3/UL (ref 0–1.3)
MONOCYTES NFR BLD AUTO: 6 %
MYELOPEROXIDASE AB SER IA-ACNC: 0.7 U/ML
MYELOPEROXIDASE AB SER IA-ACNC: NEGATIVE
NEUTROPHILS # BLD AUTO: 8.8 10E3/UL (ref 1.6–8.3)
NEUTROPHILS NFR BLD AUTO: 71 %
NITRATE UR QL: NEGATIVE
NRBC # BLD AUTO: 0 10E3/UL
NRBC BLD AUTO-RTO: 0 /100
PH UR STRIP: 5 [PH] (ref 5–7)
PLATELET # BLD AUTO: 387 10E3/UL (ref 150–450)
PROT UR-MCNC: 0.11 G/L
PROT/CREAT 24H UR: 0.09 G/G CR (ref 0–0.2)
PROTEINASE3 AB SER IA-ACNC: <1 U/ML
PROTEINASE3 AB SER IA-ACNC: NEGATIVE
RBC # BLD AUTO: 5.06 10E6/UL (ref 3.8–5.2)
RBC URINE: 1 /HPF
SP GR UR STRIP: 1.02 (ref 1–1.03)
SQUAMOUS EPITHELIAL: <1 /HPF
UROBILINOGEN UR STRIP-MCNC: NORMAL MG/DL
WBC # BLD AUTO: 12.6 10E3/UL (ref 4–11)
WBC URINE: 5 /HPF

## 2022-08-19 PROCEDURE — 84156 ASSAY OF PROTEIN URINE: CPT | Performed by: PATHOLOGY

## 2022-08-19 PROCEDURE — 99214 OFFICE O/P EST MOD 30 MIN: CPT | Performed by: INTERNAL MEDICINE

## 2022-08-19 PROCEDURE — 36415 COLL VENOUS BLD VENIPUNCTURE: CPT | Performed by: PATHOLOGY

## 2022-08-19 PROCEDURE — 81001 URINALYSIS AUTO W/SCOPE: CPT | Performed by: PATHOLOGY

## 2022-08-19 PROCEDURE — 83516 IMMUNOASSAY NONANTIBODY: CPT | Mod: 90 | Performed by: PATHOLOGY

## 2022-08-19 PROCEDURE — 84460 ALANINE AMINO (ALT) (SGPT): CPT | Performed by: PATHOLOGY

## 2022-08-19 PROCEDURE — 86140 C-REACTIVE PROTEIN: CPT | Performed by: PATHOLOGY

## 2022-08-19 PROCEDURE — 99000 SPECIMEN HANDLING OFFICE-LAB: CPT | Performed by: PATHOLOGY

## 2022-08-19 PROCEDURE — 86036 ANCA SCREEN EACH ANTIBODY: CPT | Mod: 90 | Performed by: PATHOLOGY

## 2022-08-19 PROCEDURE — 82040 ASSAY OF SERUM ALBUMIN: CPT | Performed by: PATHOLOGY

## 2022-08-19 PROCEDURE — 83036 HEMOGLOBIN GLYCOSYLATED A1C: CPT | Performed by: PATHOLOGY

## 2022-08-19 PROCEDURE — 82784 ASSAY IGA/IGD/IGG/IGM EACH: CPT | Mod: 90 | Performed by: PATHOLOGY

## 2022-08-19 PROCEDURE — 86256 FLUORESCENT ANTIBODY TITER: CPT | Mod: 90 | Performed by: PATHOLOGY

## 2022-08-19 PROCEDURE — 84450 TRANSFERASE (AST) (SGOT): CPT | Performed by: PATHOLOGY

## 2022-08-19 PROCEDURE — 99214 OFFICE O/P EST MOD 30 MIN: CPT | Performed by: FAMILY MEDICINE

## 2022-08-19 PROCEDURE — 82565 ASSAY OF CREATININE: CPT | Performed by: PATHOLOGY

## 2022-08-19 PROCEDURE — 85025 COMPLETE CBC W/AUTO DIFF WBC: CPT | Performed by: PATHOLOGY

## 2022-08-19 PROCEDURE — 83876 ASSAY MYELOPEROXIDASE: CPT | Mod: 90 | Performed by: PATHOLOGY

## 2022-08-19 PROCEDURE — G0463 HOSPITAL OUTPT CLINIC VISIT: HCPCS

## 2022-08-19 PROCEDURE — 99606 MTMS BY PHARM EST 15 MIN: CPT | Performed by: PHARMACIST

## 2022-08-19 PROCEDURE — 86355 B CELLS TOTAL COUNT: CPT | Mod: 90 | Performed by: PATHOLOGY

## 2022-08-19 PROCEDURE — 82306 VITAMIN D 25 HYDROXY: CPT | Mod: 90 | Performed by: PATHOLOGY

## 2022-08-19 PROCEDURE — 85652 RBC SED RATE AUTOMATED: CPT | Performed by: PATHOLOGY

## 2022-08-19 RX ORDER — AZELASTINE 1 MG/ML
SPRAY, METERED NASAL
COMMUNITY
Start: 2022-08-03 | End: 2023-03-09

## 2022-08-19 RX ORDER — ALBUTEROL SULFATE 0.83 MG/ML
2.5 SOLUTION RESPIRATORY (INHALATION)
Status: CANCELLED | OUTPATIENT
Start: 2022-09-01

## 2022-08-19 RX ORDER — ERGOCALCIFEROL 1.25 MG/1
50000 CAPSULE, LIQUID FILLED ORAL WEEKLY
Qty: 4 CAPSULE | Refills: 0 | Status: SHIPPED | OUTPATIENT
Start: 2022-08-19 | End: 2022-11-03

## 2022-08-19 RX ORDER — LEVOCETIRIZINE DIHYDROCHLORIDE 5 MG/1
5 TABLET, FILM COATED ORAL PRN
COMMUNITY
Start: 2022-08-03 | End: 2024-06-05

## 2022-08-19 RX ORDER — DIPHENHYDRAMINE HCL 25 MG/1
TABLET ORAL
COMMUNITY
Start: 2022-08-08 | End: 2022-08-19

## 2022-08-19 RX ORDER — EPINEPHRINE 1 MG/ML
0.3 INJECTION, SOLUTION, CONCENTRATE INTRAVENOUS EVERY 5 MIN PRN
Status: CANCELLED | OUTPATIENT
Start: 2022-09-01

## 2022-08-19 RX ORDER — MEPERIDINE HYDROCHLORIDE 25 MG/ML
25 INJECTION INTRAMUSCULAR; INTRAVENOUS; SUBCUTANEOUS EVERY 30 MIN PRN
Status: CANCELLED | OUTPATIENT
Start: 2022-09-01

## 2022-08-19 RX ORDER — SUCRALFATE 1 G/1
1 TABLET ORAL 4 TIMES DAILY
Qty: 120 TABLET | Refills: 3 | Status: SHIPPED | OUTPATIENT
Start: 2022-08-19 | End: 2023-01-19

## 2022-08-19 RX ORDER — METHYLPREDNISOLONE SODIUM SUCCINATE 125 MG/2ML
125 INJECTION, POWDER, LYOPHILIZED, FOR SOLUTION INTRAMUSCULAR; INTRAVENOUS
Status: CANCELLED
Start: 2022-09-01

## 2022-08-19 RX ORDER — FLUTICASONE PROPIONATE AND SALMETEROL 50; 250 UG/1; UG/1
1 POWDER RESPIRATORY (INHALATION) 2 TIMES DAILY
COMMUNITY
Start: 2022-08-08

## 2022-08-19 RX ORDER — PROCHLORPERAZINE MALEATE 5 MG
5 TABLET ORAL EVERY 6 HOURS PRN
Qty: 60 TABLET | Refills: 3 | Status: SHIPPED | OUTPATIENT
Start: 2022-08-19 | End: 2024-02-09

## 2022-08-19 RX ORDER — FLUTICASONE PROPIONATE 50 MCG
1 SPRAY, SUSPENSION (ML) NASAL PRN
COMMUNITY
Start: 2022-08-03

## 2022-08-19 RX ORDER — MULTIVITAMIN WITH FOLIC ACID 400 MCG
1 TABLET ORAL DAILY
Qty: 90 TABLET | Refills: 1 | Status: SHIPPED | OUTPATIENT
Start: 2022-08-19 | End: 2023-02-04

## 2022-08-19 RX ORDER — ALBUTEROL SULFATE 90 UG/1
1-2 AEROSOL, METERED RESPIRATORY (INHALATION)
Status: CANCELLED
Start: 2022-09-01

## 2022-08-19 RX ORDER — FOLIC ACID 1 MG/1
1 TABLET ORAL DAILY
Qty: 90 TABLET | Refills: 2 | Status: SHIPPED | OUTPATIENT
Start: 2022-08-19 | End: 2023-02-06

## 2022-08-19 RX ORDER — ACETAMINOPHEN 325 MG/1
650 TABLET ORAL ONCE
Status: CANCELLED
Start: 2022-09-01

## 2022-08-19 RX ORDER — DIPHENHYDRAMINE HYDROCHLORIDE 50 MG/ML
50 INJECTION INTRAMUSCULAR; INTRAVENOUS
Status: CANCELLED
Start: 2022-09-01

## 2022-08-19 RX ORDER — HEPARIN SODIUM,PORCINE 10 UNIT/ML
5 VIAL (ML) INTRAVENOUS
Status: CANCELLED | OUTPATIENT
Start: 2022-09-01

## 2022-08-19 RX ORDER — METHYLPREDNISOLONE SODIUM SUCCINATE 125 MG/2ML
80 INJECTION, POWDER, LYOPHILIZED, FOR SOLUTION INTRAMUSCULAR; INTRAVENOUS ONCE
Status: CANCELLED | OUTPATIENT
Start: 2022-09-01

## 2022-08-19 RX ORDER — HEPARIN SODIUM (PORCINE) LOCK FLUSH IV SOLN 100 UNIT/ML 100 UNIT/ML
5 SOLUTION INTRAVENOUS
Status: CANCELLED | OUTPATIENT
Start: 2022-09-01

## 2022-08-19 RX ORDER — OLOPATADINE HYDROCHLORIDE 1 MG/ML
1 SOLUTION/ DROPS OPHTHALMIC PRN
COMMUNITY
Start: 2022-08-03

## 2022-08-19 ASSESSMENT — PAIN SCALES - GENERAL: PAINLEVEL: MILD PAIN (3)

## 2022-08-19 NOTE — PATIENT INSTRUCTIONS
Labs today    Switch zofran to compazine    Rituximab 1000 mg one dose in September 2022 with less steroid (solumedrol 80 mg)    Return in 4-5 months (in person, new lots could be used)

## 2022-08-19 NOTE — LETTER
8/19/2022       RE: Janine Cornell  [address]  [facility] [address]     Dear Colleague,    Thank you for referring your patient, Janine Cornell, to the [facility] RHEUMATOLOGY CLINIC [place] at [facility]. Please see a copy of my visit note below.    Rheumatology F/U In Person Visit Note    Last visit note: 4/22/2022    Today's visit date: 08/19/2022     Reason for in person visit: F/U GPA    HPI     Ms. 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 She is on HCQ since 5/2014. On rituximab since 12/20218 with great response. Previously tried and did not tolerate MTX, AZA.    She had lateral orbitotomy and debulking orbital mass right eye on 9/26/18 with Dr. Shah and Dr. Valdez at [place]. 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 There were no complications according to the op note.    Today, 08/19/2022  Reports fatigue and headaches. Headache worsens after taking Zofran for nausea. Joint symptoms come and go. Last rituximab on 4/6/22. Rituximab is helpful but feels like it wears off prior to the 6 months. Next appointment scheduled for October 2022. Develops intermittent vaginal yeast infection and thrush related to rising blood glucose. Right eye without symptoms. No new fevers, chills, skin changes. Son is wondering if she takes herbal supplements for headaches will this interact with any of her medications. Scheduled to meet with pharmacist today.        4/22/2022:   Each rituximab infusion causes vaginal yeast infection and thrush related to rise in BG. Her BG has stayed in higher level since last rituximab. Has more ache and pain, myalgia and arthralgia. Recently has had headaches with high BP. Had last rituximab 1000 mg on 4/6.Her R eye is itching and swelling up.  Today, her BG is 177.Has gained 20 lbs since Feb 2022. Had severe  pain in her arm after covid vaccine, it took a month to get better.      12/16/2021: Janine presents for follow up. Reports ESOB with cold, has ongoing joint pain. R eye pain is intermittent.  Reports headaches after rituximab 1 gram on 10/18/2021.  Last week, her R knee was hurting.      8/20/2021: Janine has pain over arms, knees. Some days, feel stiff all day long. Some days can't do stairs. Hard to tell if there is swelling as has more water retention during summer.  Some days, eye swells up like today. No fevers, SOB, CP or cough.  Has rosacea but some days wakes up with heat rash over sun. Her face is peeling.  Sometimes can't lift things or grab things with pain from elbow to hands. Has shoulder and neck pain but this forearm pain is new.  Stopped HCQ in mid July due to concern for potential HCQ toxicity on OCT, her eye exam with Dr. Vernon has been re-scheduled and upcoming on 9/14 to address HCQ toxicity, pain is not worse off HCQ.  Not COVID vaccinated but is cautious.      5/10/2021: Joint ache, knees hurt, R elbow hurts, R arm hurts, R hand hurts. Has lots of swelling in her joints especially hands.    Depending on weather, joints jhurt, sometimes pain is 7/10.  Has problem with taking stairs and balance.  Gets off balance.   R eye gets tinglin, swelling.  Gets out of breath, gets worse with seasonal allergies.  Over past month and half, took nitro more, like 8 times.  No hemooptysis, no hematuria.  Has allergies.      4/21/2021: Had last rituximab 1 gram on 1/19, 2/2/2021. Reports rituximab starts to wear off earlier, has more sx this time. Eye swelling is intermittent. Gets indigestion/ GERD sx with constipation after the infusion.  She reports having asthma, swelling of neck, arms. She thinks that it could be from her vasculitis. She is worried about going down on rituximab dose.   Otherwise unchanged sx, no SOB or hemoptysis or persistent cough, or   Somedays her knees and hips hurt a lot, feel like bone on  bone in her knees, especially on changing position.      Component      Latest Ref Rng 2/28/2013 2/28/2013          12:14 PM 12:14 PM   WBC      4.0 - 11.0 10e9/L     RBC Count      3.8 - 5.2 10e12/L     Hemoglobin      11.7 - 15.7 g/dL     Hematocrit      35.0 - 47.0 %     MCV      78 - 100 fl     MCH      26.5 - 33.0 pg     MCHC      31.5 - 36.5 g/dL     RDW      10.0 - 15.0 %     Platelet Count      150 - 450 10e9/L     Specimen Description           Lyme Screen IgG and IgM           Vitamin D Deficiency screening      30 - 75 ug/L     Ferritin      10 - 300 ng/mL     Sed Rate      0 - 20 mm/h     ALLI Screen by EIA      <1.0     Rheumatoid Factor      0 - 14 IU/mL     CK Total      30 - 225 U/L  78   Uric Acid      2.5 - 7.5 mg/dL 6.7      Component      Latest Ref Rng 2/28/2013          12:14 PM   WBC      4.0 - 11.0 10e9/L    RBC Count      3.8 - 5.2 10e12/L    Hemoglobin      11.7 - 15.7 g/dL    Hematocrit      35.0 - 47.0 %    MCV      78 - 100 fl    MCH      26.5 - 33.0 pg    MCHC      31.5 - 36.5 g/dL    RDW      10.0 - 15.0 %    Platelet Count      150 - 450 10e9/L    Specimen Description       Serum   Lyme Screen IgG and IgM       Test value: <0.75....Interpretation: Negative....If you highly suspect Lyme . . .   Vitamin D Deficiency screening      30 - 75 ug/L    Ferritin      10 - 300 ng/mL    Sed Rate      0 - 20 mm/h    ALLI Screen by EIA      <1.0    Rheumatoid Factor      0 - 14 IU/mL    CK Total      30 - 225 U/L    Uric Acid      2.5 - 7.5 mg/dL      Component      Latest Ref Rng 2/28/2013 2/28/2013 2/28/2013 2/28/2013          12:14 PM 12:14 PM 12:14 PM 12:14 PM   WBC      4.0 - 11.0 10e9/L       RBC Count      3.8 - 5.2 10e12/L       Hemoglobin      11.7 - 15.7 g/dL       Hematocrit      35.0 - 47.0 %       MCV      78 - 100 fl       MCH      26.5 - 33.0 pg       MCHC      31.5 - 36.5 g/dL       RDW      10.0 - 15.0 %       Platelet Count      150 - 450 10e9/L       Specimen Description              Lyme Screen IgG and IgM             Vitamin D Deficiency screening      30 - 75 ug/L       Ferritin      10 - 300 ng/mL    10   Sed Rate      0 - 20 mm/h   23 (H)    ALLI Screen by EIA      <1.0  <1.0 . . .     Rheumatoid Factor      0 - 14 IU/mL 26 (H)      CK Total      30 - 225 U/L       Uric Acid      2.5 - 7.5 mg/dL         5/2013  CBC WITH PLATELETS DIFFERENTIAL       Component Value Range    WBC 11.3 (*) 4.0 - 11.0 10e9/L    RBC Count 4.56  3.8 - 5.2 10e12/L    Hemoglobin 11.1 (*) 11.7 - 15.7 g/dL    Hematocrit 34.3 (*) 35.0 - 47.0 %    MCV 75 (*) 78 - 100 fl    MCH 24.3 (*) 26.5 - 33.0 pg    MCHC 32.4  31.5 - 36.5 g/dL    RDW 16.1 (*) 10.0 - 15.0 %    Platelet Count 315  150 - 450 10e9/L    Diff Method Automated Method      % Neutrophils 71.6  40 - 75 %    % Lymphocytes 20.9  20 - 48 %    % Monocytes 4.3  0 - 12 %    % Eosinophils 2.8  0 - 6 %    % Basophils 0.2  0 - 2 %    % Immature Granulocytes 0.2  0 - 0.4 %    Absolute Neutrophil 8.1  1.6 - 8.3 10e9/L    Absolute Lymphocytes 2.4  0.8 - 5.3 10e9/L    Absolute Monoctyes 0.5  0.0 - 1.3 10e9/L    Absolute Eosinophils 0.3  0.0 - 0.7 10e9/L    Absolute Basophils 0.0  0.0 - 0.2 10e9/L    Abs Immature Granulocytes 0.0  0 - 0.03 10e9/L   AMYLASE       Component Value Range    Amylase 103  30 - 110 U/L   COMPREHENSIVE METABOLIC PANEL       Component Value Range    Sodium 144  133 - 144 mmol/L    Potassium 3.8  3.4 - 5.3 mmol/L    Chloride 105  94 - 109 mmol/L    Carbon Dioxide 23  20 - 32 mmol/L    Anion Gap 17  6 - 17 mmol/L    Glucose 173 (*) 60 - 99 mg/dL    Urea Nitrogen 13  5 - 24 mg/dL    Creatinine 0.83  0.52 - 1.04 mg/dL    GFR Estimate 74  >60 mL/min/1.7m2    GFR Estimate If Black 90  >60 mL/min/1.7m2    Calcium 9.7  8.5 - 10.4 mg/dL    Bilirubin Total 0.4  0.2 - 1.3 mg/dL    Albumin 4.3  3.9 - 5.1 g/dL    Protein Total 7.8  6.8 - 8.8 g/dL    Alkaline Phosphatase 66  40 - 150 U/L    ALT 36  0 - 50 U/L    AST 28  0 - 45 U/L   CK TOTAL       Component  Value Range    CK Total 66  30 - 225 U/L   CRP INFLAMMATION       Component Value Range    CRP Inflammation 10.4 (*) 0.0 - 8.0 mg/L   LIPASE       Component Value Range    Lipase 353 (*) 20 - 250 U/L   ERYTHROCYTE SEDIMENTATION RATE AUTO       Component Value Range    Sed Rate 26 (*) 0 - 20 mm/h   ROUTINE UA WITH MICROSCOPIC REFLEX TO CULTURE       Component Value Range    Color Urine Yellow      Appearance Urine Slightly Cloudy      Glucose Urine 30 (*) NEG mg/dL    Bilirubin Urine Negative  NEG    Ketones Urine 5 (*) NEG mg/dL    Specific Gravity Urine 1.026  1.003 - 1.035    Blood Urine Trace (*) NEG    pH Urine 5.0  5.0 - 7.0 pH    Protein Albumin Urine 10 (*) NEG mg/dL    Urobilinogen mg/dL Normal  0.0 - 2.0 mg/dL    Nitrite Urine Negative  NEG    Leukocyte Esterase Urine Negative  NEG    Source Midstream Urine      WBC Urine 1  0 - 2 /HPF    RBC Urine 4 (*) 0 - 2 /HPF    Squamous Epithelial /HPF Urine <1  0 - 1 /HPF    Mucous Urine Present (*) NEG /LPF    Hyaline Casts 3 (*) 0 - 2 /LPF    Calcium Oxalate Moderate (*) NEG /HPF   COMPLEMENT C3       Component Value Range    Complement C3 143  76 - 169 mg/dL   COMPLEMENT C4       Component Value Range    Complement C4 31  15 - 50 mg/dL   HEPATITIS C ANTIBODY       Component Value Range    Hepatitis C Antibody Negative  NEG   CARDIOLIPIN ANTIBODY IGG AND IGM       Component Value Range    Cardiolipin IgG Marline    0 - 15.0 GPL    Value: <15.0      Interpretation:  Negative    Cardiolipin IgM Marline    0 - 12.5 MPL    Value: <12.5      Interpretation:  Negative   LUPUS PANEL       Component Value Range    Lupus Result    NEG    Value: Negative      (Note)      COMMENTS:      The INR is normal.      APTT is normal.  1:2 Mix is not indicated.      DRVVT Screen is normal.      Thrombin time is normal.      NEGATIVE TEST; A LUPUS ANTICOAGULANT WAS NOT DETECTED IN THIS      SPECIMEN WITHIN THE LIMITS OF THE TESTING REPERTOIRE.      If the clinical picture is strongly  suggestive of an antiphospholipid      syndrome, recommend anticardiolipin and beta-2-glycoprotein (IgG and      IgM) antibody tests.      Leela Franks M.D.  [phone]      5/2/2013            INR =  0.93 N = 0.86-1.14  (No additional charge)      TT = 15.7 N = 13.0-19.0 sec  (No additional charge)            APTT'S:    Seconds      Reagent =  Stago LA      Normal  =  38      Patient  =  34      1:2 Mix  =  N/A      Reference =  31-43             DILUTE MADELINE VIPER VENOM TEST:      DRVVT Screen Ratio = 0.76 Normal = <1.21         IMMUNOGLOBULIN G SUBCLASSES       Component Value Range    IGG 1030  695 - 1620 mg/dL    IgG1 488  300 - 856 mg/dL    IgG2 325  158 - 761 mg/dL    IgG3 47  24 - 192 mg/dL    IgG4 18  11 - 86 mg/dL   SUNNY ANTIBODY PANEL       Component Value Range    Ribonucleic Protein IgG Antibody 0      Smith Antibody IgG 1      SSA (RO) Antibody IgG 4      SSB (LA) Antibody IgG 0      Scleroderma Antibody IgG 0     BETA 2 GLYCOPROTEIN ANTIBODIES IGG IGM       Component Value Range    Beta-2-Glycopro IgG 1      Beta-2-Glycopro IgM 5     CYCLIC CIT PEPT IGG       Component Value Range    Cyclic Cit Pept IgG/IgA    <20 UNITS    Value: <20      Interpretation:  Negative   DNA DOUBLE STRANDED ANTIBODIES       Component Value Range    DNA-ds    0 - 29 IU/mL    Value: <15      Interpretation:  Negative       CT CHEST PULMONARY EMBOLISM W CONTRAST 5/20/2015 4:57 PM  HISTORY: Pain. SOB. Elevated d-dimer.   TECHNIQUE: 65 mL Isovue 370. Axial images with coronal  reconstructions.  COMPARISON: None.  FINDINGS: Calcified granuloma with surrounding scarring in the  posterolateral left upper lobe. Triangular shaped opacity at the right  lung base in the lateral right middle lobe could represent some  scarring, atelectasis or infiltrate. There is also some scarring or  atelectasis in the posteromedial right lung base. Lungs otherwise  clear.  The pulmonary arteries are well opacified. No CT evidence for  "acute  pulmonary embolus. No aortic dissection.  Diffuse fatty infiltration of the liver.  IMPRESSION  IMPRESSION:   1. No pulmonary embolus identified.  2. Small focus of atelectasis, infiltrate or scarring in the lateral  right middle lobe.  3. Old granulomatous disease.  4. Otherwise negative.  SILVERIO VAZQUEZ MD    Copath Report      Patient Name: FAVIO MARTINEZ   MR#: [id number]   Specimen #: [id number]   Collected: 3/15/2016   Received: 3/15/2016   Reported: 3/17/2016 13:33   Ordering Phy(s): PARVEEN ENRIQUEZ     ORIGINAL REPORT FOLLOWS ADDENDUM  ADDENDUM     TO ORIGINAL REPORT   Status: Signed Out   Date Ordered:3/18/2016   Date Complete:3/18/2016   Date Reported:3/18/2016 12:06   Signed Out By: Marianne Godfrey MD     INTERPRETATION:   This addendum is done to incorporate the results of fungal (GMS) stains   done on the specimen.  Specimen is negative for fungal organisms.  The   original final diagnosis remains unchanged.     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  Negative for malignancy(Please   see microscopic description)     Electronically signed out by:     Marianne Godfrey MD     CLINICAL HISTORY:   right orbital mass     GROSS:   The specimen is received labeled \"right orbital biopsy\" and consists of   red-tan nodule measuring 1.5 x 0.9 x 0.6 cm with one smooth side and   opposite rough side consistent with periosteum.  The specimen is   bisected revealing homogenous pale tan fleshy cut surface.  Touch   preparations are prepared, air dried and fixed, portion of specimen is   submitted in RPMI for possible lymphoma workup [facility],   ([facility], [place], [place] ).  The remainder is entirely submitted.   (Dictated by: Marianne Godfrey MD 3/15/2016 04:45 PM) "     MICROSCOPIC:     Specimen consists of portion of lacrimal gland with acute and chronic   inflammation.  Focal area of microabscess formation associated with   small areas of necrosis are also present.  Inflammation is seen   extending to the periorbital adipose tissue forming panniculitis.   Specimen is negative for malignancy.  Samples sent for immunophenotyping   to [facility], ([facility], [place], [place] ) reveals no evidence   of monoclonality or aberrant antigen expression.  A GMS (fungal) stain   is pending and results will be reported as an addendum.     CPT Codes:   A: 08439-OB7, 95818-SGDGG, SOH, 97835-SMSOQ, 72330-QWRI     TESTING LAB LOCATION:   [address]  [address] [phone]     COLLECTION SITE:   [facility]   [facility]            Component      Latest Ref Rng 4/29/2016   Nucleated RBCs      0 /100 0   Absolute Neutrophil      1.6 - 8.3 10e9/L 8.9 (H)   Absolute Lymphocytes      0.8 - 5.3 10e9/L 3.2   Absolute Monocytes      0.0 - 1.3 10e9/L 0.8   Absolute Eosinophils      0.0 - 0.7 10e9/L 0.2   Absolute Basophils      0.0 - 0.2 10e9/L 0.1   Abs Immature Granulocytes      0 - 0.4 10e9/L 0.1   Absolute Nucleated RBC       0.0   IGG      695 - 1620 mg/dL 836   IgG1      300 - 856 mg/dL 280 (L)   IgG2      158 - 761 mg/dL 277   IgG3      24 - 192 mg/dL 35   IgG4      11 - 86 mg/dL 16   RNP Antibody IgG      0.0 - 0.9 AI <0.2 . . .   Smith SUNNY Antibody IgG      0.0 - 0.9 AI <0.2 . . .   SSA (Ro) (SUNNY) Antibody, IgG      0.0 - 0.9 AI <0.2 . . .   SSB (La) (SUNNY) Antibody, IgG      0.0 - 0.9 AI <0.2 . . .   Scleroderma Antibody Scl-70 SUNNY IgG      0.0 - 0.9 AI <0.2 . . .   Cholesterol      <200 mg/dL 154   Triglycerides      <150 mg/dL 220 (H)   HDL Cholesterol      >49 mg/dL 42 (L)   LDL Cholesterol Calculated      <100 mg/dL 67   Non HDL Cholesterol      <130 mg/dL 111   M Tuberculosis Result      NEG  Negative   M Tuberculosis Antigen Value       0.00   Albumin      3.4 - 5.0 g/dL 4.3   ALT      0 - 50 U/L 30   AST      0 - 45 U/L 10   Complement C3      76 - 169 mg/dL 157   Complement C4      15 - 50 mg/dL 32   CRP Inflammation      0.0 - 8.0 mg/L <2.9   Sed Rate      0 - 20 mm/h 2   DNA-ds      <10 IU/mL 1   Cyclic Citrullinated Peptide Antibody, IgG      <7 U/mL 1   Rheumatoid Factor      <20 IU/mL <20   Proteinase 3 Antibody IgG      0.0 - 0.9 AI <0.2 . . .   Myeloperoxidase Antibody IgG      0.0 - 0.9 AI 2.5 (H)   Vitamin D Deficiency screening      20 - 75 ug/L 24   Hemoglobin A1C      4.3 - 6.0 % 7.9 (H)   ALLI by IFA IgG       1:40 (A) . . .     Component      Latest Ref Rng & Units 1/16/2021   WBC      4.0 - 11.0 10e9/L    RBC Count      3.8 - 5.2 10e12/L    Hemoglobin      11.7 - 15.7 g/dL    Hematocrit      35.0 - 47.0 %    MCV      78 - 100 fl    MCH      26.5 - 33.0 pg    MCHC      31.5 - 36.5 g/dL    RDW      10.0 - 15.0 %    Platelet Count      150 - 450 10e9/L    Diff Method          % Neutrophils      %    % Lymphocytes      %    % Monocytes      %    % Eosinophils      %    % Basophils      %    % Immature Granulocytes      %    Nucleated RBCs      0 /100    Absolute Neutrophil      1.6 - 8.3 10e9/L    Absolute Lymphocytes      0.8 - 5.3 10e9/L    Absolute Monocytes      0.0 - 1.3 10e9/L    Absolute Eosinophils      0.0 - 0.7 10e9/L    Absolute Basophils      0.0 - 0.2 10e9/L    Abs Immature Granulocytes      0 - 0.4 10e9/L    Absolute Nucleated RBC          IGG      610 - 1,616 mg/dL    IGA      84 - 499 mg/dL    IGM      35 - 242 mg/dL    Creatinine      0.52 - 1.04 mg/dL    GFR Estimate      >60 mL/min/1.73:m2    GFR Estimate If Black      >60 mL/min/1.73:m2    COVID-19 Virus PCR to [facility] - Source       Nasopharyngeal   COVID-19 Virus PCR to [facility] - Result       Not Detected   Neutrophil Cytoplasmic Antibody      <1:10 titer    Neutrophil Cytoplasmic Antibody Pattern          CD19 B  Cells      6 - 27 %    Absolute CD19      107 - 698 cells/uL    Myeloperoxidase Antibody IgG      0.0 - 0.9 AI    Proteinase 3 Antibody IgG      0.0 - 0.9 AI    Vitamin D Deficiency screening      20 - 75 ug/L    Sed Rate      0 - 30 mm/h    CRP Inflammation      0.0 - 8.0 mg/L    Albumin      3.4 - 5.0 g/dL    ALT      0 - 50 U/L    AST      0 - 45 U/L      Component      Latest Ref Rng & Units 5/7/2021   WBC      4.0 - 11.0 10e9/L 8.9   RBC Count      3.8 - 5.2 10e12/L 4.89   Hemoglobin      11.7 - 15.7 g/dL 12.7   Hematocrit      35.0 - 47.0 % 39.7   MCV      78 - 100 fl 81   MCH      26.5 - 33.0 pg 26.0 (L)   MCHC      31.5 - 36.5 g/dL 32.0   RDW      10.0 - 15.0 % 13.7   Platelet Count      150 - 450 10e9/L 336   Diff Method       Automated Method   % Neutrophils      % 65.3   % Lymphocytes      % 20.7   % Monocytes      % 7.8   % Eosinophils      % 5.3   % Basophils      % 0.7   % Immature Granulocytes      % 0.2   Nucleated RBCs      0 /100 0   Absolute Neutrophil      1.6 - 8.3 10e9/L 5.8   Absolute Lymphocytes      0.8 - 5.3 10e9/L 1.8   Absolute Monocytes      0.0 - 1.3 10e9/L 0.7   Absolute Eosinophils      0.0 - 0.7 10e9/L 0.5   Absolute Basophils      0.0 - 0.2 10e9/L 0.1   Abs Immature Granulocytes      0 - 0.4 10e9/L 0.0   Absolute Nucleated RBC       0.0   IGG      610 - 1,616 mg/dL 649   IGA      84 - 499 mg/dL 199   IGM      35 - 242 mg/dL 36   Creatinine      0.52 - 1.04 mg/dL 0.96   GFR Estimate      >60 mL/min/1.73:m2 66   GFR Estimate If Black      >60 mL/min/1.73:m2 77   COVID-19 Virus PCR to [facility] - Source          COVID-19 Virus PCR to [facility] - Result          Neutrophil Cytoplasmic Antibody      <1:10 titer <1:10   Neutrophil Cytoplasmic Antibody Pattern       The ANCA IFA is <1:10.  No further testing will be performed.   CD19 B Cells      6 - 27 % <1 (L)   Absolute CD19      107 - 698 cells/uL <1 (L)   Myeloperoxidase Antibody IgG      0.0 - 0.9 AI 1.1 (H)   Proteinase 3 Antibody IgG       0.0 - 0.9 AI <0.2   Vitamin D Deficiency screening      20 - 75 ug/L 41   Sed Rate      0 - 30 mm/h 9   CRP Inflammation      0.0 - 8.0 mg/L <2.9   Albumin      3.4 - 5.0 g/dL 4.1   ALT      0 - 50 U/L 28   AST      0 - 45 U/L 18     Lab on 07/08/2022   Component Date Value Ref Range Status     Sodium 07/08/2022 143  133 - 144 mmol/L Final     Potassium 07/08/2022 3.9  3.4 - 5.3 mmol/L Final     Chloride 07/08/2022 108  94 - 109 mmol/L Final     Carbon Dioxide (CO2) 07/08/2022 25  20 - 32 mmol/L Final     Anion Gap 07/08/2022 10  3 - 14 mmol/L Final     Urea Nitrogen 07/08/2022 17  7 - 30 mg/dL Final     Creatinine 07/08/2022 1.01  0.52 - 1.04 mg/dL Final     Calcium 07/08/2022 9.3  8.5 - 10.1 mg/dL Final     Glucose 07/08/2022 103 (A) 70 - 99 mg/dL Final     GFR Estimate 07/08/2022 65  >60 mL/min/1.73m2 Final    Effective December 21, 2021 eGFRcr in adults is calculated using the 2021 CKD-EPI creatinine equation which includes age and gender (Ryan et al., [place], DOI: 10.1056/QXROly9916143)     WBC Count 07/08/2022 12.0 (A) 4.0 - 11.0 10e3/uL Final     RBC Count 07/08/2022 4.94  3.80 - 5.20 10e6/uL Final     Hemoglobin 07/08/2022 12.6  11.7 - 15.7 g/dL Final     Hematocrit 07/08/2022 39.6  35.0 - 47.0 % Final     MCV 07/08/2022 80  78 - 100 fL Final     MCH 07/08/2022 25.5 (A) 26.5 - 33.0 pg Final     MCHC 07/08/2022 31.8  31.5 - 36.5 g/dL Final     RDW 07/08/2022 13.6  10.0 - 15.0 % Final     Platelet Count 07/08/2022 350  150 - 450 10e3/uL Final     % Neutrophils 07/08/2022 66  % Final     % Lymphocytes 07/08/2022 22  % Final     % Monocytes 07/08/2022 9  % Final     % Eosinophils 07/08/2022 3  % Final     % Basophils 07/08/2022 0  % Final     % Immature Granulocytes 07/08/2022 0  % Final     NRBCs per 100 WBC 07/08/2022 0  <1 /100 Final     Absolute Neutrophils 07/08/2022 7.9  1.6 - 8.3 10e3/uL Final     Absolute Lymphocytes 07/08/2022 2.7  0.8 - 5.3 10e3/uL Final     Absolute Monocytes 07/08/2022 1.1  0.0 -  1.3 10e3/uL Final     Absolute Eosinophils 07/08/2022 0.3  0.0 - 0.7 10e3/uL Final     Absolute Basophils 07/08/2022 0.1  0.0 - 0.2 10e3/uL Final     Absolute Immature Granulocytes 07/08/2022 0.0  <=0.4 10e3/uL Final     Absolute NRBCs 07/08/2022 0.0  10e3/uL Final       ROS: A comprehensive ROS was done. Positives are per HPI.      HISTORY REVIEW:  Past Medical History:   Diagnosis Date     Abnormal glandular Papanicolaou smear of cervix 1992     Allergic rhinitis     Allergy, airborne subst     Arthritis      ASCVD (arteriosclerotic cardiovascular disease)      Chronic pain      Chronic pancreatitis (H)     idiopathic, Tx: PPI, antioxidants, pancreatic enzymes     Common migraine      Coronary artery disease      Costochondritis      Difficulty in walking(719.7)      Dyspnea on exertion      Ectasia, mammary duct     [facility], persistent nipple discharge     Elevated fasting glucose      Gastro-oesophageal reflux disease      Granulomatosis with polyangiitis (H)      Hernia      History of angina      Hyperlipidemia LDL goal < 100      Hypertension goal BP (blood pressure) < 140/90     Essential hypertension     Iron deficiency anemia      Ischemic cardiomyopathy      Menorrhagia      Migraine headaches      Mild persistent asthma      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 Matias, neg w/u, Rx'd w/prednisone     Ventral hernia, unspecified, without mention of obstruction or gangrene 2012     Past Surgical History:   Procedure Laterality Date     C/SECTION, LOW TRANSVERSE           CARDIAC SURGERY      cardiac stent- recent in 16; 4 other stents     DILATION AND CURETTAGE N/A 2016    Procedure: DILATION AND CURETTAGE;  Surgeon: Nahed Butler MD;  Location: [facility] OR     ESOPHAGOSCOPY, GASTROSCOPY, DUODENOSCOPY (EGD), COMBINED N/A 3/31/2022    Procedure: ESOPHAGOGASTRODUODENOSCOPY, WITH BIOPSY;  Surgeon: Enzo Sesay MD;  Location:  GI     [facility] ENDOSCOPY W EUS  08    Dr. Pastrana, possible chronic pancreatitis, fatty liver     HERNIA REPAIR  2012    done at [facility] [place]     INSERT INTRAUTERINE DEVICE N/A 2016    Procedure: INSERT INTRAUTERINE DEVICE;  Surgeon: Nahed Butler MD;  Location: [facility]     INT UTERINE FIBRIOD EMBOLIZATION  10/29/2014     LAPAROSCOPIC CHOLECYSTECTOMY  08    Dr. Arnol GRUBBS TX, CERVICAL      s/p LEEP     ORBITOTOMY Right 3/15/2016    Procedure: ORBITOTOMY;  Surgeon: Myron Cyr MD;  Location: [facility]     ORBITOTOMY Right 2017    Procedure: ORBITOTOMY;  RIGHT ORBITOTOMY AND BIOPSY;  Surgeon: Charis Holbrook MD;  Location: [facility]     REPAIR PTOSIS Right 2017    Procedure: REPAIR PTOSIS;  RIGHT UPPER LID PTOSIS REPAIR;  Surgeon: Myron Cyr MD;  Location: [facility]     UPPER GI ENDOSCOPY  10/21/08    mild gastritis, Dr. Rocky [facility] ECHO HEART XTHORACIC,COMPLETE, W/O DOPPLER  04    [facility] WNL, EF 60%     Family History   Problem Relation Age of Onset     Heart Disease Father 50        heart attack     Cerebrovascular Disease Father      Diabetes Father      Hypertension Father      Depression Father      C.A.D. Father      Hypertension Mother      Arthritis Mother      Heart Disease Mother         long qt syndrome     Thyroid Disease  Mother      C.A.CHELLE. Mother      Heart Disease Brother 15        MI at 15, 16.      Diabetes Maternal Uncle      Hypertension Maternal Aunt      Hypertension Maternal Uncle      Arthritis Brother         he passed away, had severe arthritis at age 11     Arthritis Maternal Uncle      Eye Disorder Maternal Uncle         cataracts     Neurologic Disorder Sister         migraines     Neurologic Disorder Sister         migraines     Respiratory Son         asthma     Cerebrovascular Disease Maternal Uncle      C.A.D. Brother      Family History Negative Other         neg for RA, SLE     Unknown/Adopted No family hx of         unknown neurological issues in her family, mother was adopted     Skin Cancer No family hx of         No known family hx of skin cancer     Social History     Socioeconomic History     Marital status: Single     Spouse name: Not on file     Number of children: 1     Years of education: Not on file     Highest education level: Not on file   Occupational History     Employer: NONE    Tobacco Use     Smoking status: Current Some Day Smoker     Packs/day: 0.20     Years: 1.00     Pack years: 0.20     Types: Cigarettes     Last attempt to quit: 2016     Years since quittin.5     Smokeless tobacco: Never Used   Substance and Sexual Activity     Alcohol use: No     Alcohol/week: 0.0 standard drinks     Drug use: No     Sexual activity: Not Currently   Other Topics Concern     Parent/sibling w/ CABG, MI or angioplasty before 65F 55M? Yes   Social History Narrative    Balanced Diet - sometimes    Osteoporosis Prevention Measures - Dairy servings per day: 2 servings weekly    Regular Exercise -  Yes Describe walking 4 times a week    Dental Exam - NO    Seatbelts used - Yes    Self Breast Exam - Yes    Abuse: Current or Past (Physical, Sexual or Emotional)- No    Do you have any concerns about STD's -  No    Do you feel safe in your environment - No    Guns stored in the home - No    Sunscreen  used - Yes    Lipids -  YES - Date: 053102    Glucose -  YES - Date: 096045    Eye Exam - YES - Date: one year ago    Colon Cancer Screening - No    Hemoccults - NO    Pap Test -  YES - Date: 070904, remote history of LEEP    Mammography - YES - Date: last spring, would like to discuss, needs a referral to [facility]    DEXA - NO    Immunizations reviewed and up to date - Yes, last td given in 1997 or 1998     Social Determinants of Health     Financial Resource Strain: Not on file   Food Insecurity: Not on file   Transportation Needs: Not on file   Physical Activity: Not on file   Stress: Not on file   Social Connections: Not on file   Intimate Partner Violence: Not on file   Housing Stability: Not on file     Patient Active Problem List   Diagnosis     Seasonal affective disorder (H)     Allergic rhinitis     PCOS (polycystic ovarian syndrome)     Moderate persistent asthma     Chronic pancreatitis (H)     Hypertension goal BP (blood pressure) < 140/90     Common migraine     NSTEMI (non-ST elevated myocardial infarction) (H)     Hyperlipidemia LDL goal <70     ASCVD (arteriosclerotic cardiovascular disease)     GERD (gastroesophageal reflux disease)     Ischemic cardiomyopathy     Hypertensive heart disease     Uterine leiomyoma     Iron deficiency anemia     Costochondritis     Vitamin D deficiency     Breast cancer screening     Spinal stenosis in cervical region     Fibromyalgia     Seronegative rheumatoid arthritis (H)     Type 2 diabetes, HbA1C goal < 8% (H)     Type 2 diabetes mellitus with other specified complication (H)     Hyperlipidemia associated with type 2 diabetes mellitus (H)     Hypertension associated with diabetes (H)     Overweight with body mass index (BMI) 25.0-29.9     Tobacco use disorder     Telogen effluvium     CAD S/P percutaneous coronary angioplasty     Status post coronary angiogram     ANCA-associated vasculitis (H)     Wegener's vasculitis     Type 1 diabetes  mellitus with complications (H)     Chest discomfort     Urinary frequency     Abdominal pain, epigastric     Hypokalemia     Leukocytosis, unspecified type     Acute pancreatitis, unspecified complication status, unspecified pancreatitis type       Pregnancy Hx: She is . All miscarriages were in first trimester. H/o OC use in the past. 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    PMHx, FHx, SHx were reviewed, unchanged.    Outpatient Encounter Medications as of 2022   Medication Sig Dispense Refill     acetaminophen (TYLENOL) 325 MG tablet Take 1-2 tablets (325-650 mg) by mouth every 6 hours as needed for pain (headache) 250 tablet 4     albuterol (2.5 MG/3ML) 0.083% neb solution INL 1 VIAL VIA NEBULIZATION Q 4 TO 6 HOURS PRN  1     albuterol (PROAIR HFA, PROVENTIL HFA, VENTOLIN HFA) 108 (90 BASE) MCG/ACT inhaler Inhale 2 puffs into the lungs every 6 hours as needed for shortness of breath / dyspnea or wheezing 3 Inhaler 1     blood glucose (NO BRAND SPECIFIED) lancets standard Use to test blood sugar 1-4 times daily or as directed. 400 each 3     blood glucose (NO BRAND SPECIFIED) test strip Use to test blood sugar 1-4 times daily or as directed. 400 strip 3     Blood Pressure Monitor KIT 1 each daily Monitor home blood pressure as instructed by physician.  Dispense [place] blood pressure kit. 1 kit 0     calcium carbonate (TUMS) 500 MG chewable tablet Take 3-4 tablets (1,500-2,000 mg) by mouth daily as needed 90 tablet 3     clopidogrel (PLAVIX) 75 MG tablet Take 1 tablet (75 mg) by mouth daily . 30-day refills only. 30 tablet 11     COMPRESSION STOCKINGS 2 each daily Apply one 10-15 mmHg compression stocking to each lower extgmierty during the day and remove before bedtime. 4 each 2     cyclobenzaprine (FLEXERIL) 10 MG tablet Take 1 tablet (10 mg) by mouth 2 times daily as needed for muscle spasms 60 tablet 3     dicyclomine (BENTYL) 20 MG tablet TAKE 1 TABLET(20 MG) BY MOUTH FOUR TIMES  DAILY AS NEEDED. Pls schedule clinic appt for refills. 60 tablet 0     diphenhydrAMINE (BENADRYL) 25 MG capsule Take 1 capsule (25 mg) by mouth nightly as needed for itching or allergies 30 capsule 2     EPIPEN 2-RIKY 0.3 MG/0.3ML injection INJECT 0.3 MG INTO THE MUSCLE PRF ANAPHYALAXIS  0     Ergocalciferol (VITAMIN D2) 50 MCG (2000 UT) TABS Take 2,000 Units by mouth daily 90 tablet 1     esomeprazole (NEXIUM) 40 MG DR capsule Take 1 capsule (40 mg) by mouth 2 times daily Take 30-60 minutes before eating. 180 capsule 0     fexofenadine (ALLEGRA) 180 MG tablet Take 1 tablet by mouth daily as needed. 90 tablet 3     folic acid (FOLVITE) 1 MG tablet Take 1 tablet (1 mg) by mouth daily 90 tablet 2     insulin glargine (LANTUS PEN) 100 UNIT/ML pen Inject 75 Units Subcutaneous every morning Or as directed. 75 mL 3     insulin pen needle (BD MARCK U/F) 32G X 4 MM miscellaneous USE DAILY OR AS DIRECTED 300 each 3     ketoconazole (NIZORAL) 2 % shampoo Apply topically daily as needed       lisinopril-hydrochlorothiazide (ZESTORETIC) 20-25 MG tablet Take 1 tablet by mouth daily . 30-day refills only. 30 tablet 11     magnesium 250 MG tablet Take 1 tablet (250 mg) by mouth 2 times daily 60 tablet 0     metFORMIN (GLUCOPHAGE-XR) 500 MG 24 hr tablet Take 4 tablets (2,000 mg) by mouth daily (with dinner) 120 tablet 11     metoprolol succinate ER (TOPROL XL) 100 MG 24 hr tablet Take 1 tablet (100 mg) by mouth daily . 30-day refills only. 30 tablet 11     Multiple Vitamin (DAILY-GERALD MULTIVITAMIN) TABS Take 1 tablet by mouth daily 90 tablet 1     nitroGLYcerin (NITROSTAT) 0.4 MG sublingual tablet Place 1 tablet (0.4 mg) under the tongue every 5 minutes as needed for chest pain . After 3 doses, if pain persists call 911. 25 tablet 3     pravastatin (PRAVACHOL) 40 MG tablet Take 1 tablet (40 mg) by mouth daily . 30-day refills only. 30 tablet 11     prochlorperazine (COMPAZINE) 5 MG tablet Take 1 tablet (5 mg) by mouth every 6 hours  as needed for nausea or vomiting 60 tablet 3     sennosides (SENOKOT) 8.6 MG tablet 1-2 tabs a day as needed for constipation 60 tablet 1     spironolactone (ALDACTONE) 25 MG tablet Take 1 tablet (25 mg) by mouth daily . Take one additional 0.5 tab daily as needed for weight gain. 45 day refills. 45 tablet 11     terbinafine (LAMISIL) 1 % external cream Apply topically 2 times daily 42 g 1     traMADol (ULTRAM) 50 MG tablet Take 1 tablet (50 mg) by mouth every 8 hours as needed for severe pain 60 tablet 3     vitamin D2 (ERGOCALCIFEROL) 75524 units (1250 mcg) capsule Take 1 capsule (50,000 Units) by mouth once a week 4 capsule 0     [DISCONTINUED] blood glucose monitoring (NO BRAND SPECIFIED) meter device kit Use to test blood sugar 4 X times daily or as directed. (Patient taking differently: 1 kit Use to test blood sugar 4 X times daily or as directed.) 1 kit 0     [DISCONTINUED] blood glucose monitoring (NO BRAND SPECIFIED) test strip 1 strip by In Vitro route 4 times daily Test as directed. Please dispense three months and three months of refills. Thank you. (Patient taking differently: 1 strip by In Vitro route 4 times daily Test as directed. Please dispense three months and three months of refills. Thank you.) 360 each 3     [DISCONTINUED] fluconazole (DIFLUCAN) 200 MG tablet Take 1 tablet (200 mg) by mouth daily 14 tablet 0     [DISCONTINUED] polyethylene glycol (MIRALAX) 17 GM/Dose powder Take 17 g (1 capful) by mouth daily 507 g 1     [DISCONTINUED] sucralfate (CARAFATE) 1 GM/10ML suspension Take 10 mLs (1 g) by mouth 4 times daily 1200 mL 1     [DISCONTINUED] SYMBICORT 80-4.5 MCG/ACT Inhaler        empagliflozin (JARDIANCE) 10 MG TABS tablet Take 1 tablet (10 mg) by mouth daily (Patient not taking: No sig reported) 30 tablet 4     famotidine (PEPCID) 20 MG tablet Take 1 tablet (20 mg) by mouth 2 times daily as needed (abdominal pain) (Patient not taking: No sig reported) 20 tablet 0     [DISCONTINUED]  fluocinolone (SYNALAR) 0.01 % solution Apply topically daily as needed (Patient not taking: No sig reported)       [DISCONTINUED] fluocinonide (LIDEX) 0.05 % external ointment Apply topically as needed  (Patient not taking: No sig reported)       [DISCONTINUED] fluticasone-vilanterol (BREO ELLIPTA) 200-25 MCG/INH inhaler Inhale 1 puff into the lungs daily (Patient not taking: No sig reported)       [DISCONTINUED] folic acid (FOLVITE) 1 MG tablet Take 1 tablet (1 mg) by mouth daily 90 tablet 2     [DISCONTINUED] montelukast (SINGULAIR) 10 MG tablet Take 1 tablet (10 mg) by mouth At Bedtime (Patient not taking: No sig reported) 30 tablet 1     [DISCONTINUED] Multiple Vitamin (DAILY-GERALD MULTIVITAMIN) TABS Take 1 tablet by mouth daily 90 tablet 1     [DISCONTINUED] nitroFURantoin macrocrystal-monohydrate (MACROBID) 100 MG capsule Take 1 capsule (100 mg) by mouth 2 times daily (Patient not taking: No sig reported) 14 capsule 0     [DISCONTINUED] nystatin (MYCOSTATIN) 489410 UNIT/ML suspension Take 5 mLs (500,000 Units) by mouth 4 times daily (Patient not taking: No sig reported) 200 mL 0     [DISCONTINUED] ondansetron (ZOFRAN-ODT) 4 MG ODT tab Take 1 tablet (4 mg) by mouth every 8 hours as needed for nausea 12 tablet 0     [DISCONTINUED] ondansetron (ZOFRAN-ODT) 8 MG ODT tab Take 1 tablet (8 mg) by mouth every 8 hours as needed for nausea 20 tablet 1     [DISCONTINUED] terconazole (TERAZOL 3) 80 MG vaginal suppository Place 1 suppository (80 mg) vaginally At Bedtime (Patient not taking: No sig reported) 3 suppository 1     [DISCONTINUED] vitamin D2 (ERGOCALCIFEROL) 16909 units (1250 mcg) capsule Take 1 capsule (50,000 Units) by mouth once a week 4 capsule 0     [DISCONTINUED] vitamin D3 (CHOLECALCIFEROL) 50 mcg (2000 units) tablet Take 1 tablet (50 mcg) by mouth daily (Patient not taking: No sig reported) 90 tablet 1     No facility-administered encounter medications on file as of 8/19/2022.       Allergies   Allergen  Reactions     Amoxicillin-Pot Clavulanate      Augmentin      Unknown possible hives and edema     Azithromycin      Diatrizoate Other (See Comments)     Pt wants this listed because she is allergic to shellfish      Imitrex [Sumatriptan]      Severe face/neck/chest tightness and flushing side effects      Penicillins Hives     Unknown      Pork Allergy      Stomach pain, cramping, diarrhea, itching, nausea and headaches     Shellfish Allergy Hives and Swelling     Sulfa Drugs Hives and Swelling     Zithromax [Azithromycin Dihydrate] Swelling     Unknown        Ph.E:    BP 93/67   Pulse 69   Wt 79.8 kg (175 lb 14.4 oz)   SpO2 97%   BMI 32.17 kg/m    GA: NAD, cooperative  HEENT: nl conj, sclera, EOMI. No campos-orbital edema  Chest: CTAB  CV: no M/R/G, RRR  Abdomen: soft, NT  MSK: no active synovitis or joint tenderness  Skin: no rash  Neuro: non focal  Psych: flat affect    Assessment/Plan:    1-Seropositive non-erosive RA (arthritis, AM stiffness, high CRP, RF 26 but re-check neg 3/2015 on HCQ) Dx 5/2013, FMS, new pleuritic CP 3/20-15-5/2015 resolved on steroids. She is on  mg bid since 5/2014. She tolerates it well and it's helping some but not enough to control all her pain. Continued having flare ups of joint pain, could be GPA related. Some pain is due to FMS.My impression is that her arthralgias are a combination of RA/ IA sec GPA, fibromyalgia and chronic pain.     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 Prednisone was not a good option for her given weight gain, diabetes. She tried and did not tolerate MTX, AZA.    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 Repeat MPO was positive in 6/2017.    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 Shah and Dr. Valdez at [facility].  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     She is on rituximab since 12/12/2018 with great response, minor allergic reaction which could be managed by pre-meds and extra steroid dose. Developed high BG and vaginal yeast infections after solumedrol premed. Treated candida with fluconazole. Will decrease solumerdol dose to IV 80 mg prior to receiving rituximab. 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 However, feels Rituximab effects wear off around 4 months. According to ACR guidelines can give every 4-6 months. Pt elected to received this upcoming September which will be approximately 5 months from last dose. Repeat Orbit CT in September 2021 demonstrated improvement.     Last visit in April 2022-- Recommended evusheld given b cell depletion tx as rituximab blocks the response to covid vaccine, she is concerned about side effects. I advised her to discuss with [facility] pharmacist.      2-Fat pads. She was seen by endocrinology and cushing was ruled out in 4/2014. Was advised to do f/u for enlarged thyroid/thyroid nodules.  3-Hair loss. Continue with dermatology  4-Chronic pain. F/U with pain clinic  5-Vit D deficiency. Was replaced with vit D 50, 000 units po qwk x 12 wk then 2000 units every day  6-Discussed COVID vaccination, timing with rituximab to get max benefit, she had severe sore arm.  7-?HCQ toxicity on OCT 7/2021, now off HCQ, could explain increased arthralgia  8- Chronic Headaches. Symptoms worsen after taking zofran. Has received compazine in hospital in the past without adverse effect. Will have patient trial Compazine       Today's plan:    -Labs today   -Plan for repeat rituximab 1000 mg one dose in early September with less steroid (solumedrol 80 mg IV)  -Compazine as needed for nausea  -Follow up with Pharmacist to discuss potential interactions of herbal supplements with current medication regimen  -Follow up with Dr. Vernon  -[facility] follow up  -Return in 6 months (in person)      I saw and examined the  patient with Dr. Mckinnon. I acted as scribe for Dr. Mckinnon.    Kamryn Murphy MS4    Attending Note: I saw and examined the patient with medical student Kamryn. This note was written by her who acted as scribe for me. I agree with findings and recommendations written in this note. The note reflects decisions made by me.          Majo Mckinnon MD

## 2022-08-19 NOTE — PROGRESS NOTES
Rheumatology F/U In Person Visit Note    Last visit note: 4/22/2022    Today's visit date: 08/19/2022     Reason for in person visit: F/U GPA    HPI     Ms. 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 She is on HCQ since 5/2014. On rituximab since 12/20218 with great response. Previously tried and did not tolerate MTX, AZA.    She had lateral orbitotomy and debulking orbital mass right eye on 9/26/18 with Dr. Shah and Dr. Valdez at [facility]. 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 There were no complications according to the op note.    Today, 08/19/2022  Reports fatigue and headaches. Headache worsens after taking Zofran for nausea. Joint symptoms come and go. Last rituximab on 4/6/22. Rituximab is helpful but feels like it wears off prior to the 6 months. Next appointment scheduled for October 2022. Develops intermittent vaginal yeast infection and thrush related to rising blood glucose. Right eye without symptoms. No new fevers, chills, skin changes. Son is wondering if she takes herbal supplements for headaches will this interact with any of her medications. Scheduled to meet with pharmacist today.        4/22/2022:   Each rituximab infusion causes vaginal yeast infection and thrush related to rise in BG. Her BG has stayed in higher level since last rituximab. Has more ache and pain, myalgia and arthralgia. Recently has had headaches with high BP. Had last rituximab 1000 mg on 4/6.Her R eye is itching and swelling up.  Today, her BG is 177.Has gained 20 lbs since Feb 2022. Had severe pain in her arm after covid vaccine, it took a month to get better.      12/16/2021: Janine presents for follow up. Reports ESOB with cold, has ongoing joint pain. R eye pain is intermittent.  Reports headaches after rituximab 1 gram on 10/18/2021.  Last week, her R knee was hurting.      8/20/2021: Janine has  pain over arms, knees. Some days, feel stiff all day long. Some days can't do stairs. Hard to tell if there is swelling as has more water retention during summer.  Some days, eye swells up like today. No fevers, SOB, CP or cough.  Has rosacea but some days wakes up with heat rash over sun. Her face is peeling.  Sometimes can't lift things or grab things with pain from elbow to hands. Has shoulder and neck pain but this forearm pain is new.  Stopped HCQ in mid July due to concern for potential HCQ toxicity on OCT, her eye exam with Dr. Vernon has been re-scheduled and upcoming on 9/14 to address HCQ toxicity, pain is not worse off HCQ.  Not COVID vaccinated but is cautious.      5/10/2021: Joint ache, knees hurt, R elbow hurts, R arm hurts, R hand hurts. Has lots of swelling in her joints especially hands.    Depending on weather, joints jhurt, sometimes pain is 7/10.  Has problem with taking stairs and balance.  Gets off balance.   R eye gets tinglin, swelling.  Gets out of breath, gets worse with seasonal allergies.  Over past month and half, took nitro more, like 8 times.  No hemooptysis, no hematuria.  Has allergies.      4/21/2021: Had last rituximab 1 gram on 1/19, 2/2/2021. Reports rituximab starts to wear off earlier, has more sx this time. Eye swelling is intermittent. Gets indigestion/ GERD sx with constipation after the infusion.  She reports having asthma, swelling of neck, arms. She thinks that it could be from her vasculitis. She is worried about going down on rituximab dose.   Otherwise unchanged sx, no SOB or hemoptysis or persistent cough, or   Somedays her knees and hips hurt a lot, feel like bone on bone in her knees, especially on changing position.      Component      Latest Ref Rng 2/28/2013 2/28/2013          12:14 PM 12:14 PM   WBC      4.0 - 11.0 10e9/L     RBC Count      3.8 - 5.2 10e12/L     Hemoglobin      11.7 - 15.7 g/dL     Hematocrit      35.0 - 47.0 %     MCV      78 - 100 fl     MCH       26.5 - 33.0 pg     MCHC      31.5 - 36.5 g/dL     RDW      10.0 - 15.0 %     Platelet Count      150 - 450 10e9/L     Specimen Description           Lyme Screen IgG and IgM           Vitamin D Deficiency screening      30 - 75 ug/L     Ferritin      10 - 300 ng/mL     Sed Rate      0 - 20 mm/h     ALLI Screen by EIA      <1.0     Rheumatoid Factor      0 - 14 IU/mL     CK Total      30 - 225 U/L  78   Uric Acid      2.5 - 7.5 mg/dL 6.7      Component      Latest Ref [facility] 2/28/2013          12:14 PM   WBC      4.0 - 11.0 10e9/L    RBC Count      3.8 - 5.2 10e12/L    Hemoglobin      11.7 - 15.7 g/dL    Hematocrit      35.0 - 47.0 %    MCV      78 - 100 fl    MCH      26.5 - 33.0 pg    MCHC      31.5 - 36.5 g/dL    RDW      10.0 - 15.0 %    Platelet Count      150 - 450 10e9/L    Specimen Description       Serum   Lyme Screen IgG and IgM       Test value: <0.75....Interpretation: Negative....If you highly suspect Lyme . . .   Vitamin D Deficiency screening      30 - 75 ug/L    Ferritin      10 - 300 ng/mL    Sed Rate      0 - 20 mm/h    ALLI Screen by EIA      <1.0    Rheumatoid Factor      0 - 14 IU/mL    CK Total      30 - 225 U/L    Uric Acid      2.5 - 7.5 mg/dL      Component      Latest Ref [facility] 2/28/2013 2/28/2013 2/28/2013 2/28/2013          12:14 PM 12:14 PM 12:14 PM 12:14 PM   WBC      4.0 - 11.0 10e9/L       RBC Count      3.8 - 5.2 10e12/L       Hemoglobin      11.7 - 15.7 g/dL       Hematocrit      35.0 - 47.0 %       MCV      78 - 100 fl       MCH      26.5 - 33.0 pg       MCHC      31.5 - 36.5 g/dL       RDW      10.0 - 15.0 %       Platelet Count      150 - 450 10e9/L       Specimen Description             Lyme Screen IgG and IgM             Vitamin D Deficiency screening      30 - 75 ug/L       Ferritin      10 - 300 ng/mL    10   Sed Rate      0 - 20 mm/h   23 (H)    ALLI Screen by EIA      <1.0  <1.0 . . .     Rheumatoid Factor      0 - 14 IU/mL 26 (H)      CK Total      30 - 225 U/L       Uric Acid       2.5 - 7.5 mg/dL         5/2013  CBC WITH PLATELETS DIFFERENTIAL       Component Value Range    WBC 11.3 (*) 4.0 - 11.0 10e9/L    RBC Count 4.56  3.8 - 5.2 10e12/L    Hemoglobin 11.1 (*) 11.7 - 15.7 g/dL    Hematocrit 34.3 (*) 35.0 - 47.0 %    MCV 75 (*) 78 - 100 fl    MCH 24.3 (*) 26.5 - 33.0 pg    MCHC 32.4  31.5 - 36.5 g/dL    RDW 16.1 (*) 10.0 - 15.0 %    Platelet Count 315  150 - 450 10e9/L    Diff Method Automated Method      % Neutrophils 71.6  40 - 75 %    % Lymphocytes 20.9  20 - 48 %    % Monocytes 4.3  0 - 12 %    % Eosinophils 2.8  0 - 6 %    % Basophils 0.2  0 - 2 %    % Immature Granulocytes 0.2  0 - 0.4 %    Absolute Neutrophil 8.1  1.6 - 8.3 10e9/L    Absolute Lymphocytes 2.4  0.8 - 5.3 10e9/L    Absolute Monoctyes 0.5  0.0 - 1.3 10e9/L    Absolute Eosinophils 0.3  0.0 - 0.7 10e9/L    Absolute Basophils 0.0  0.0 - 0.2 10e9/L    Abs Immature Granulocytes 0.0  0 - 0.03 10e9/L   AMYLASE       Component Value Range    Amylase 103  30 - 110 U/L   COMPREHENSIVE METABOLIC PANEL       Component Value Range    Sodium 144  133 - 144 mmol/L    Potassium 3.8  3.4 - 5.3 mmol/L    Chloride 105  94 - 109 mmol/L    Carbon Dioxide 23  20 - 32 mmol/L    Anion Gap 17  6 - 17 mmol/L    Glucose 173 (*) 60 - 99 mg/dL    Urea Nitrogen 13  5 - 24 mg/dL    Creatinine 0.83  0.52 - 1.04 mg/dL    GFR Estimate 74  >60 mL/min/1.7m2    GFR Estimate If Black 90  >60 mL/min/1.7m2    Calcium 9.7  8.5 - 10.4 mg/dL    Bilirubin Total 0.4  0.2 - 1.3 mg/dL    Albumin 4.3  3.9 - 5.1 g/dL    Protein Total 7.8  6.8 - 8.8 g/dL    Alkaline Phosphatase 66  40 - 150 U/L    ALT 36  0 - 50 U/L    AST 28  0 - 45 U/L   CK TOTAL       Component Value Range    CK Total 66  30 - 225 U/L   CRP INFLAMMATION       Component Value Range    CRP Inflammation 10.4 (*) 0.0 - 8.0 mg/L   LIPASE       Component Value Range    Lipase 353 (*) 20 - 250 U/L   ERYTHROCYTE SEDIMENTATION RATE AUTO       Component Value Range    Sed Rate 26 (*) 0 - 20 mm/h   ROUTINE UA  WITH MICROSCOPIC REFLEX TO CULTURE       Component Value Range    Color Urine Yellow      Appearance Urine Slightly Cloudy      Glucose Urine 30 (*) NEG mg/dL    Bilirubin Urine Negative  NEG    Ketones Urine 5 (*) NEG mg/dL    Specific Gravity Urine 1.026  1.003 - 1.035    Blood Urine Trace (*) NEG    pH Urine 5.0  5.0 - 7.0 pH    Protein Albumin Urine 10 (*) NEG mg/dL    Urobilinogen mg/dL Normal  0.0 - 2.0 mg/dL    Nitrite Urine Negative  NEG    Leukocyte Esterase Urine Negative  NEG    Source Midstream Urine      WBC Urine 1  0 - 2 /HPF    RBC Urine 4 (*) 0 - 2 /HPF    Squamous Epithelial /HPF Urine <1  0 - 1 /HPF    Mucous Urine Present (*) NEG /LPF    Hyaline Casts 3 (*) 0 - 2 /LPF    Calcium Oxalate Moderate (*) NEG /HPF   COMPLEMENT C3       Component Value Range    Complement C3 143  76 - 169 mg/dL   COMPLEMENT C4       Component Value Range    Complement C4 31  15 - 50 mg/dL   HEPATITIS C ANTIBODY       Component Value Range    Hepatitis C Antibody Negative  NEG   CARDIOLIPIN ANTIBODY IGG AND IGM       Component Value Range    Cardiolipin IgG Marline    0 - 15.0 GPL    Value: <15.0      Interpretation:  Negative    Cardiolipin IgM Marline    0 - 12.5 MPL    Value: <12.5      Interpretation:  Negative   LUPUS PANEL       Component Value Range    Lupus Result    NEG    Value: Negative      (Note)      COMMENTS:      The INR is normal.      APTT is normal.  1:2 Mix is not indicated.      DRVVT Screen is normal.      Thrombin time is normal.      NEGATIVE TEST; A LUPUS ANTICOAGULANT WAS NOT DETECTED IN THIS      SPECIMEN WITHIN THE LIMITS OF THE TESTING REPERTOIRE.      If the clinical picture is strongly suggestive of an antiphospholipid      syndrome, recommend anticardiolipin and beta-2-glycoprotein (IgG and      IgM) antibody tests.      Leela Franks M.D.  [phone]      5/2/2013            INR =  0.93 N = 0.86-1.14  (No additional charge)      TT = 15.7 N = 13.0-19.0 sec  (No additional charge)             APTT'S:    Seconds      Reagent =  Stago LA      Normal  =  38      Patient  =  34      1:2 Mix  =  N/A      Reference =  31-43             DILUTE MADELINE VIPER VENOM TEST:      DRVVT Screen Ratio = 0.76 Normal = <1.21         IMMUNOGLOBULIN G SUBCLASSES       Component Value Range    IGG 1030  695 - 1620 mg/dL    IgG1 488  300 - 856 mg/dL    IgG2 325  158 - 761 mg/dL    IgG3 47  24 - 192 mg/dL    IgG4 18  11 - 86 mg/dL   SUNNY ANTIBODY PANEL       Component Value Range    Ribonucleic Protein IgG Antibody 0      Smith Antibody IgG 1      SSA (RO) Antibody IgG 4      SSB (LA) Antibody IgG 0      Scleroderma Antibody IgG 0     BETA 2 GLYCOPROTEIN ANTIBODIES IGG IGM       Component Value Range    Beta-2-Glycopro IgG 1      Beta-2-Glycopro IgM 5     CYCLIC CIT PEPT IGG       Component Value Range    Cyclic Cit Pept IgG/IgA    <20 UNITS    Value: <20      Interpretation:  Negative   DNA DOUBLE STRANDED ANTIBODIES       Component Value Range    DNA-ds    0 - 29 IU/mL    Value: <15      Interpretation:  Negative       CT CHEST PULMONARY EMBOLISM W CONTRAST 5/20/2015 4:57 PM  HISTORY: Pain. SOB. Elevated d-dimer.   TECHNIQUE: 65 mL Isovue 370. Axial images with coronal  reconstructions.  COMPARISON: None.  FINDINGS: Calcified granuloma with surrounding scarring in the  posterolateral left upper lobe. Triangular shaped opacity at the right  lung base in the lateral right middle lobe could represent some  scarring, atelectasis or infiltrate. There is also some scarring or  atelectasis in the posteromedial right lung base. Lungs otherwise  clear.  The pulmonary arteries are well opacified. No CT evidence for acute  pulmonary embolus. No aortic dissection.  Diffuse fatty infiltration of the liver.  IMPRESSION  IMPRESSION:   1. No pulmonary embolus identified.  2. Small focus of atelectasis, infiltrate or scarring in the lateral  right middle lobe.  3. Old granulomatous disease.  4. Otherwise negative.  SILVERIO VAZQUEZ  "MD Nava Report      Patient Name: FAVIO MARTINEZ   MR#: [id number]   Specimen #: [id number]   Collected: 3/15/2016   Received: 3/15/2016   Reported: 3/17/2016 13:33   Ordering Phy(s): PARVEEN ENRIQUEZ     ORIGINAL REPORT FOLLOWS ADDENDUM  ADDENDUM     TO ORIGINAL REPORT   Status: Signed Out   Date Ordered:3/18/2016   Date Complete:3/18/2016   Date Reported:3/18/2016 12:06   Signed Out By: Marianne Godfrey MD     INTERPRETATION:   This addendum is done to incorporate the results of fungal (GMS) stains   done on the specimen.  Specimen is negative for fungal organisms.  The   original final diagnosis remains unchanged.     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  Negative for malignancy(Please   see microscopic description)     Electronically signed out by:     Marianne Godfrey MD     CLINICAL HISTORY:   right orbital mass     GROSS:   The specimen is received labeled \"right orbital biopsy\" and consists of   red-tan nodule measuring 1.5 x 0.9 x 0.6 cm with one smooth side and   opposite rough side consistent with periosteum.  The specimen is   bisected revealing homogenous pale tan fleshy cut surface.  Touch   preparations are prepared, air dried and fixed, portion of specimen is   submitted in RPMI for possible lymphoma workup [facility],   ([facility], [place], [place] ).  The remainder is entirely submitted.   (Dictated by: Marianne Godfrey MD 3/15/2016 04:45 PM)     MICROSCOPIC:     Specimen consists of portion of lacrimal gland with acute and chronic   inflammation.  Focal area of microabscess formation associated with   small areas of necrosis are also present.  Inflammation is seen   extending to the periorbital adipose tissue forming panniculitis.   Specimen is negative for " malignancy.  Samples sent for immunophenotyping   to [facility], ([facility], [place], [place] ) reveals no evidence   of monoclonality or aberrant antigen expression.  A GMS (fungal) stain   is pending and results will be reported as an addendum.     CPT Codes:   A: 92398-HE6, 85706-YRVJS, SOH, 51471-NYSMM, 82042-ABLR     TESTING LAB LOCATION:   [address]  [address] [phone]     COLLECTION SITE:   [facility]   [facility]            Component      Latest Ref Rng 4/29/2016   Nucleated RBCs      0 /100 0   Absolute Neutrophil      1.6 - 8.3 10e9/L 8.9 (H)   Absolute Lymphocytes      0.8 - 5.3 10e9/L 3.2   Absolute Monocytes      0.0 - 1.3 10e9/L 0.8   Absolute Eosinophils      0.0 - 0.7 10e9/L 0.2   Absolute Basophils      0.0 - 0.2 10e9/L 0.1   Abs Immature Granulocytes      0 - 0.4 10e9/L 0.1   Absolute Nucleated RBC       0.0   IGG      695 - 1620 mg/dL 836   IgG1      300 - 856 mg/dL 280 (L)   IgG2      158 - 761 mg/dL 277   IgG3      24 - 192 mg/dL 35   IgG4      11 - 86 mg/dL 16   RNP Antibody IgG      0.0 - 0.9 AI <0.2 . . .   Smith SUNNY Antibody IgG      0.0 - 0.9 AI <0.2 . . .   SSA (Ro) (SUNNY) Antibody, IgG      0.0 - 0.9 AI <0.2 . . .   SSB (La) (SUNNY) Antibody, IgG      0.0 - 0.9 AI <0.2 . . .   Scleroderma Antibody Scl-70 SUNNY IgG      0.0 - 0.9 AI <0.2 . . .   Cholesterol      <200 mg/dL 154   Triglycerides      <150 mg/dL 220 (H)   HDL Cholesterol      >49 mg/dL 42 (L)   LDL Cholesterol Calculated      <100 mg/dL 67   Non HDL Cholesterol      <130 mg/dL 111   M Tuberculosis Result      NEG Negative   M Tuberculosis Antigen Value       0.00   Albumin      3.4 - 5.0 g/dL 4.3   ALT      0 - 50 U/L 30   AST      0 - 45 U/L 10   Complement C3      76 - 169 mg/dL 157   Complement C4      15 - 50 mg/dL 32   CRP Inflammation      0.0 - 8.0 mg/L <2.9   Sed Rate      0 - 20 mm/h 2   DNA-ds      <10 IU/mL 1   Cyclic  Citrullinated Peptide Antibody, IgG      <7 U/mL 1   Rheumatoid Factor      <20 IU/mL <20   Proteinase 3 Antibody IgG      0.0 - 0.9 AI <0.2 . . .   Myeloperoxidase Antibody IgG      0.0 - 0.9 AI 2.5 (H)   Vitamin D Deficiency screening      20 - 75 ug/L 24   Hemoglobin A1C      4.3 - 6.0 % 7.9 (H)   ALLI by IFA IgG       1:40 (A) . . .     Component      Latest Ref Rng & Units 1/16/2021   WBC      4.0 - 11.0 10e9/L    RBC Count      3.8 - 5.2 10e12/L    Hemoglobin      11.7 - 15.7 g/dL    Hematocrit      35.0 - 47.0 %    MCV      78 - 100 fl    MCH      26.5 - 33.0 pg    MCHC      31.5 - 36.5 g/dL    RDW      10.0 - 15.0 %    Platelet Count      150 - 450 10e9/L    Diff Method          % Neutrophils      %    % Lymphocytes      %    % Monocytes      %    % Eosinophils      %    % Basophils      %    % Immature Granulocytes      %    Nucleated RBCs      0 /100    Absolute Neutrophil      1.6 - 8.3 10e9/L    Absolute Lymphocytes      0.8 - 5.3 10e9/L    Absolute Monocytes      0.0 - 1.3 10e9/L    Absolute Eosinophils      0.0 - 0.7 10e9/L    Absolute Basophils      0.0 - 0.2 10e9/L    Abs Immature Granulocytes      0 - 0.4 10e9/L    Absolute Nucleated RBC          IGG      610 - 1,616 mg/dL    IGA      84 - 499 mg/dL    IGM      35 - 242 mg/dL    Creatinine      0.52 - 1.04 mg/dL    GFR Estimate      >60 mL/min/1.73:m2    GFR Estimate If Black      >60 mL/min/1.73:m2    COVID-19 Virus PCR to [facility] - Source       Nasopharyngeal   COVID-19 Virus PCR to [facility] - Result       Not Detected   Neutrophil Cytoplasmic Antibody      <1:10 titer    Neutrophil Cytoplasmic Antibody Pattern          CD19 B Cells      6 - 27 %    Absolute CD19      107 - 698 cells/uL    Myeloperoxidase Antibody IgG      0.0 - 0.9 AI    Proteinase 3 Antibody IgG      0.0 - 0.9 AI    Vitamin D Deficiency screening      20 - 75 ug/L    Sed Rate      0 - 30 mm/h    CRP Inflammation      0.0 - 8.0 mg/L    Albumin      3.4 - 5.0 g/dL    ALT      0 - 50  U/L    AST      0 - 45 U/L      Component      Latest Ref Rng & Units 5/7/2021   WBC      4.0 - 11.0 10e9/L 8.9   RBC Count      3.8 - 5.2 10e12/L 4.89   Hemoglobin      11.7 - 15.7 g/dL 12.7   Hematocrit      35.0 - 47.0 % 39.7   MCV      78 - 100 fl 81   MCH      26.5 - 33.0 pg 26.0 (L)   MCHC      31.5 - 36.5 g/dL 32.0   RDW      10.0 - 15.0 % 13.7   Platelet Count      150 - 450 10e9/L 336   Diff Method       Automated Method   % Neutrophils      % 65.3   % Lymphocytes      % 20.7   % Monocytes      % 7.8   % Eosinophils      % 5.3   % Basophils      % 0.7   % Immature Granulocytes      % 0.2   Nucleated RBCs      0 /100 0   Absolute Neutrophil      1.6 - 8.3 10e9/L 5.8   Absolute Lymphocytes      0.8 - 5.3 10e9/L 1.8   Absolute Monocytes      0.0 - 1.3 10e9/L 0.7   Absolute Eosinophils      0.0 - 0.7 10e9/L 0.5   Absolute Basophils      0.0 - 0.2 10e9/L 0.1   Abs Immature Granulocytes      0 - 0.4 10e9/L 0.0   Absolute Nucleated RBC       0.0   IGG      610 - 1,616 mg/dL 649   IGA      84 - 499 mg/dL 199   IGM      35 - 242 mg/dL 36   Creatinine      0.52 - 1.04 mg/dL 0.96   GFR Estimate      >60 mL/min/1.73:m2 66   GFR Estimate If Black      >60 mL/min/1.73:m2 77   COVID-19 Virus PCR to [facility] - Source          COVID-19 Virus PCR to [facility] - Result          Neutrophil Cytoplasmic Antibody      <1:10 titer <1:10   Neutrophil Cytoplasmic Antibody Pattern       The ANCA IFA is <1:10.  No further testing will be performed.   CD19 B Cells      6 - 27 % <1 (L)   Absolute CD19      107 - 698 cells/uL <1 (L)   Myeloperoxidase Antibody IgG      0.0 - 0.9 AI 1.1 (H)   Proteinase 3 Antibody IgG      0.0 - 0.9 AI <0.2   Vitamin D Deficiency screening      20 - 75 ug/L 41   Sed Rate      0 - 30 mm/h 9   CRP Inflammation      0.0 - 8.0 mg/L <2.9   Albumin      3.4 - 5.0 g/dL 4.1   ALT      0 - 50 U/L 28   AST      0 - 45 U/L 18     Lab on 07/08/2022   Component Date Value Ref Range Status     Sodium 07/08/2022 143  133 -  144 mmol/L Final     Potassium 07/08/2022 3.9  3.4 - 5.3 mmol/L Final     Chloride 07/08/2022 108  94 - 109 mmol/L Final     Carbon Dioxide (CO2) 07/08/2022 25  20 - 32 mmol/L Final     Anion Gap 07/08/2022 10  3 - 14 mmol/L Final     Urea Nitrogen 07/08/2022 17  7 - 30 mg/dL Final     Creatinine 07/08/2022 1.01  0.52 - 1.04 mg/dL Final     Calcium 07/08/2022 9.3  8.5 - 10.1 mg/dL Final     Glucose 07/08/2022 103 (A) 70 - 99 mg/dL Final     GFR Estimate 07/08/2022 65  >60 mL/min/1.73m2 Final    Effective December 21, 2021 eGFRcr in adults is calculated using the 2021 CKD-EPI creatinine equation which includes age and gender (Ryan et al., [place], DOI: 10.1056/EKRUda4293529)     WBC Count 07/08/2022 12.0 (A) 4.0 - 11.0 10e3/uL Final     RBC Count 07/08/2022 4.94  3.80 - 5.20 10e6/uL Final     Hemoglobin 07/08/2022 12.6  11.7 - 15.7 g/dL Final     Hematocrit 07/08/2022 39.6  35.0 - 47.0 % Final     MCV 07/08/2022 80  78 - 100 fL Final     MCH 07/08/2022 25.5 (A) 26.5 - 33.0 pg Final     MCHC 07/08/2022 31.8  31.5 - 36.5 g/dL Final     RDW 07/08/2022 13.6  10.0 - 15.0 % Final     Platelet Count 07/08/2022 350  150 - 450 10e3/uL Final     % Neutrophils 07/08/2022 66  % Final     % Lymphocytes 07/08/2022 22  % Final     % Monocytes 07/08/2022 9  % Final     % Eosinophils 07/08/2022 3  % Final     % Basophils 07/08/2022 0  % Final     % Immature Granulocytes 07/08/2022 0  % Final     NRBCs per 100 WBC 07/08/2022 0  <1 /100 Final     Absolute Neutrophils 07/08/2022 7.9  1.6 - 8.3 10e3/uL Final     Absolute Lymphocytes 07/08/2022 2.7  0.8 - 5.3 10e3/uL Final     Absolute Monocytes 07/08/2022 1.1  0.0 - 1.3 10e3/uL Final     Absolute Eosinophils 07/08/2022 0.3  0.0 - 0.7 10e3/uL Final     Absolute Basophils 07/08/2022 0.1  0.0 - 0.2 10e3/uL Final     Absolute Immature Granulocytes 07/08/2022 0.0  <=0.4 10e3/uL Final     Absolute NRBCs 07/08/2022 0.0  10e3/uL Final       ROS: A comprehensive ROS was done. Positives are per  HPI.      HISTORY REVIEW:  Past Medical History:   Diagnosis Date     Abnormal glandular Papanicolaou smear of cervix      Allergic rhinitis     Allergy, airborne subst     Arthritis      ASCVD (arteriosclerotic cardiovascular disease)      Chronic pain      Chronic pancreatitis (H)     idiopathic, Tx: PPI, antioxidants, pancreatic enzymes     Common migraine      Coronary artery disease      Costochondritis      Difficulty in walking(719.7)      Dyspnea on exertion      Ectasia, mammary duct     [facility], persistent nipple discharge     Elevated fasting glucose      Gastro-oesophageal reflux disease      Granulomatosis with polyangiitis (H)      Hernia      History of angina      Hyperlipidemia LDL goal < 100      Hypertension goal BP (blood pressure) < 140/90     Essential hypertension     Iron deficiency anemia      Ischemic cardiomyopathy      Menorrhagia      Migraine headaches      Mild persistent asthma      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 Matias, neg w/u, Rx'd w/prednisone     Ventral hernia, unspecified, without mention of obstruction or gangrene 2012     Past Surgical History:   Procedure Laterality Date     C/SECTION, LOW TRANSVERSE           CARDIAC SURGERY      cardiac stent- recent in 16; 4 other  stents     DILATION AND CURETTAGE N/A 2016    Procedure: DILATION AND CURETTAGE;  Surgeon: Nahed Butler MD;  Location: [facility]     ESOPHAGOSCOPY, GASTROSCOPY, DUODENOSCOPY (EGD), COMBINED N/A 3/31/2022    Procedure: ESOPHAGOGASTRODUODENOSCOPY, WITH BIOPSY;  Surgeon: Enzo Sesay MD;  Location: [facility]     HC UGI ENDOSCOPY W EUS  08    Dr. Pastrana, possible chronic pancreatitis, fatty liver     HERNIA REPAIR  2012    done at [facility]     INSERT INTRAUTERINE DEVICE N/A 2016    Procedure: INSERT INTRAUTERINE DEVICE;  Surgeon: Nahed Butler MD;  Location: [facility]     INT UTERINE FIBRIOD EMBOLIZATION  10/29/2014     LAPAROSCOPIC CHOLECYSTECTOMY  08    Dr. Arnol GRUBBS TX, CERVICAL      s/p LEEP     ORBITOTOMY Right 3/15/2016    Procedure: ORBITOTOMY;  Surgeon: Myron Cyr MD;  [facility]     ORBITOTOMY Right 2017    Procedure: ORBITOTOMY;  RIGHT ORBITOTOMY AND BIOPSY;  Surgeon: Charis Holbrook MD;  [facility]     REPAIR PTOSIS Right 2017    Procedure: REPAIR PTOSIS;  RIGHT UPPER LID PTOSIS REPAIR;  Surgeon: Myron Cyr MD;  Location: [facility]     UPPER GI ENDOSCOPY  10/21/08    mild gastritis, Dr. Rocky [facility] ECHO HEART XTHORACIC,COMPLETE, W/O DOPPLER  04    [facility] WNL, EF 60%     Family History   Problem Relation Age of Onset     Heart Disease Father 50        heart attack     Cerebrovascular Disease Father      Diabetes Father      Hypertension Father      Depression Father      C.A.D. Father      Hypertension Mother      Arthritis Mother      Heart Disease Mother         long qt syndrome     Thyroid Disease Mother      C.A.D. Mother      Heart Disease Brother 15        MI at 15, 16.      Diabetes Maternal Uncle      Hypertension Maternal Aunt      Hypertension Maternal Uncle      Arthritis Brother         he passed away, had severe arthritis at age 11     Arthritis Maternal Uncle      Eye Disorder Maternal Uncle          cataracts     Neurologic Disorder Sister         migraines     Neurologic Disorder Sister         migraines     Respiratory Son         asthma     Cerebrovascular Disease Maternal Uncle      C.A.D. Brother      Family History Negative Other         neg for RA, SLE     Unknown/Adopted No family hx of         unknown neurological issues in her family, mother was adopted     Skin Cancer No family hx of         No known family hx of skin cancer     Social History     Socioeconomic History     Marital status: Single     Spouse name: Not on file     Number of children: 1     Years of education: Not on file     Highest education level: Not on file   Occupational History     Employer: NONE    Tobacco Use     Smoking status: Current Some Day Smoker     Packs/day: 0.20     Years: 1.00     Pack years: 0.20     Types: Cigarettes     Last attempt to quit: 2016     Years since quittin.5     Smokeless tobacco: Never Used   Substance and Sexual Activity     Alcohol use: No     Alcohol/week: 0.0 standard drinks     Drug use: No     Sexual activity: Not Currently   Other Topics Concern     Parent/sibling w/ CABG, MI or angioplasty before 65F 55M? Yes   Social History Narrative    Balanced Diet - sometimes    Osteoporosis Prevention Measures - Dairy servings per day: 2 servings weekly    Regular Exercise -  Yes Describe walking 4 times a week    Dental Exam - NO    Seatbelts used - Yes    Self Breast Exam - Yes    Abuse: Current or Past (Physical, Sexual or Emotional)- No    Do you have any concerns about STD's -  No    Do you feel safe in your environment - No    Guns stored in the home - No    Sunscreen used - Yes    Lipids -  YES - Date:     Glucose -  YES - Date:     Eye Exam - YES - Date: one year ago    Colon Cancer Screening - No    Hemoccults - NO    Pap Test -  YES - Date: , remote history of LEEP    Mammography - YES - Date: last spring, would like to discuss, needs a referral to [facility]    DEXA - NO    Immunizations reviewed and up to date - Yes, last td given in  or      Social Determinants of Health     Financial Resource Strain: Not on file   Food Insecurity: Not on file   Transportation Needs: Not on file   Physical Activity: Not on file   Stress: Not on file   Social Connections: Not on file   Intimate Partner Violence: Not on file   Housing Stability: Not on file     Patient Active Problem List   Diagnosis     Seasonal affective disorder (H)     Allergic rhinitis     PCOS (polycystic ovarian syndrome)     Moderate persistent asthma     Chronic pancreatitis (H)     Hypertension goal BP (blood pressure) < 140/90     Common migraine     NSTEMI (non-ST elevated myocardial infarction) (H)     Hyperlipidemia LDL goal <70     ASCVD (arteriosclerotic cardiovascular disease)     GERD (gastroesophageal reflux disease)     Ischemic cardiomyopathy     Hypertensive heart disease     Uterine leiomyoma     Iron deficiency anemia     Costochondritis     Vitamin D deficiency     Breast cancer screening     Spinal stenosis in cervical region     Fibromyalgia     Seronegative rheumatoid arthritis (H)     Type 2 diabetes, HbA1C goal < 8% (H)     Type 2 diabetes mellitus with other specified complication (H)     Hyperlipidemia associated with type 2 diabetes mellitus (H)     Hypertension associated with diabetes (H)     Overweight with body mass index (BMI) 25.0-29.9     Tobacco use disorder     Telogen effluvium     CAD S/P percutaneous coronary angioplasty     Status post coronary angiogram     ANCA-associated vasculitis (H)     Wegener's vasculitis     Type 1 diabetes mellitus with complications (H)     Chest discomfort     Urinary frequency     Abdominal pain, epigastric     Hypokalemia     Leukocytosis, unspecified type     Acute pancreatitis, unspecified complication status, unspecified pancreatitis type       Pregnancy Hx: She is . All miscarriages were in first trimester. H/o OC use in  the past. Stopped OC in 1992 after having sudden blindness of R eye.    PMHx, FHx, SHx were reviewed, unchanged.    Outpatient Encounter Medications as of 8/19/2022   Medication Sig Dispense Refill     acetaminophen (TYLENOL) 325 MG tablet Take 1-2 tablets (325-650 mg) by mouth every 6 hours as needed for pain (headache) 250 tablet 4     albuterol (2.5 MG/3ML) 0.083% neb solution INL 1 VIAL VIA NEBULIZATION Q 4 TO 6 HOURS PRN  1     albuterol (PROAIR HFA, PROVENTIL HFA, VENTOLIN HFA) 108 (90 BASE) MCG/ACT inhaler Inhale 2 puffs into the lungs every 6 hours as needed for shortness of breath / dyspnea or wheezing 3 Inhaler 1     blood glucose (NO BRAND SPECIFIED) lancets standard Use to test blood sugar 1-4 times daily or as directed. 400 each 3     blood glucose (NO BRAND SPECIFIED) test strip Use to test blood sugar 1-4 times daily or as directed. 400 strip 3     Blood Pressure Monitor KIT 1 each daily Monitor home blood pressure as instructed by physician.  Dispense [place] blood pressure kit. 1 kit 0     calcium carbonate (TUMS) 500 MG chewable tablet Take 3-4 tablets (1,500-2,000 mg) by mouth daily as needed 90 tablet 3     clopidogrel (PLAVIX) 75 MG tablet Take 1 tablet (75 mg) by mouth daily . 30-day refills only. 30 tablet 11     COMPRESSION STOCKINGS 2 each daily Apply one 10-15 mmHg compression stocking to each lower extgmierty during the day and remove before bedtime. 4 each 2     cyclobenzaprine (FLEXERIL) 10 MG tablet Take 1 tablet (10 mg) by mouth 2 times daily as needed for muscle spasms 60 tablet 3     dicyclomine (BENTYL) 20 MG tablet TAKE 1 TABLET(20 MG) BY MOUTH FOUR TIMES DAILY AS NEEDED. Pls schedule clinic appt for refills. 60 tablet 0     diphenhydrAMINE (BENADRYL) 25 MG capsule Take 1 capsule (25 mg) by mouth nightly as needed for itching or allergies 30 capsule 2     EPIPEN 2-RIKY 0.3 MG/0.3ML injection INJECT 0.3 MG INTO THE MUSCLE PRF ANAPHYALAXIS  0     Ergocalciferol (VITAMIN D2) 50 MCG (2000  UT) TABS Take 2,000 Units by mouth daily 90 tablet 1     esomeprazole (NEXIUM) 40 MG DR capsule Take 1 capsule (40 mg) by mouth 2 times daily Take 30-60 minutes before eating. 180 capsule 0     fexofenadine (ALLEGRA) 180 MG tablet Take 1 tablet by mouth daily as needed. 90 tablet 3     folic acid (FOLVITE) 1 MG tablet Take 1 tablet (1 mg) by mouth daily 90 tablet 2     insulin glargine (LANTUS PEN) 100 UNIT/ML pen Inject 75 Units Subcutaneous every morning Or as directed. 75 mL 3     insulin pen needle (BD MARCK U/F) 32G X 4 MM miscellaneous USE DAILY OR AS DIRECTED 300 each 3     ketoconazole (NIZORAL) 2 % shampoo Apply topically daily as needed       lisinopril-hydrochlorothiazide (ZESTORETIC) 20-25 MG tablet Take 1 tablet by mouth daily . 30-day refills only. 30 tablet 11     magnesium 250 MG tablet Take 1 tablet (250 mg) by mouth 2 times daily 60 tablet 0     metFORMIN (GLUCOPHAGE-XR) 500 MG 24 hr tablet Take 4 tablets (2,000 mg) by mouth daily (with dinner) 120 tablet 11     metoprolol succinate ER (TOPROL XL) 100 MG 24 hr tablet Take 1 tablet (100 mg) by mouth daily . 30-day refills only. 30 tablet 11     Multiple Vitamin (DAILY-GERALD MULTIVITAMIN) TABS Take 1 tablet by mouth daily 90 tablet 1     nitroGLYcerin (NITROSTAT) 0.4 MG sublingual tablet Place 1 tablet (0.4 mg) under the tongue every 5 minutes as needed for chest pain . After 3 doses, if pain persists call 911. 25 tablet 3     pravastatin (PRAVACHOL) 40 MG tablet Take 1 tablet (40 mg) by mouth daily . 30-day refills only. 30 tablet 11     prochlorperazine (COMPAZINE) 5 MG tablet Take 1 tablet (5 mg) by mouth every 6 hours as needed for nausea or vomiting 60 tablet 3     sennosides (SENOKOT) 8.6 MG tablet 1-2 tabs a day as needed for constipation 60 tablet 1     spironolactone (ALDACTONE) 25 MG tablet Take 1 tablet (25 mg) by mouth daily . Take one additional 0.5 tab daily as needed for weight gain. 45 day refills. 45 tablet 11     terbinafine (LAMISIL)  1 % external cream Apply topically 2 times daily 42 g 1     traMADol (ULTRAM) 50 MG tablet Take 1 tablet (50 mg) by mouth every 8 hours as needed for severe pain 60 tablet 3     vitamin D2 (ERGOCALCIFEROL) 35312 units (1250 mcg) capsule Take 1 capsule (50,000 Units) by mouth once a week 4 capsule 0     [DISCONTINUED] blood glucose monitoring (NO BRAND SPECIFIED) meter device kit Use to test blood sugar 4 X times daily or as directed. (Patient taking differently: 1 kit Use to test blood sugar 4 X times daily or as directed.) 1 kit 0     [DISCONTINUED] blood glucose monitoring (NO BRAND SPECIFIED) test strip 1 strip by In Vitro route 4 times daily Test as directed. Please dispense three months and three months of refills. Thank you. (Patient taking differently: 1 strip by In Vitro route 4 times daily Test as directed. Please dispense three months and three months of refills. Thank you.) 360 each 3     [DISCONTINUED] fluconazole (DIFLUCAN) 200 MG tablet Take 1 tablet (200 mg) by mouth daily 14 tablet 0     [DISCONTINUED] polyethylene glycol (MIRALAX) 17 GM/Dose powder Take 17 g (1 capful) by mouth daily 507 g 1     [DISCONTINUED] sucralfate (CARAFATE) 1 GM/10ML suspension Take 10 mLs (1 g) by mouth 4 times daily 1200 mL 1     [DISCONTINUED] SYMBICORT 80-4.5 MCG/ACT Inhaler        empagliflozin (JARDIANCE) 10 MG TABS tablet Take 1 tablet (10 mg) by mouth daily (Patient not taking: No sig reported) 30 tablet 4     famotidine (PEPCID) 20 MG tablet Take 1 tablet (20 mg) by mouth 2 times daily as needed (abdominal pain) (Patient not taking: No sig reported) 20 tablet 0     [DISCONTINUED] fluocinolone (SYNALAR) 0.01 % solution Apply topically daily as needed (Patient not taking: No sig reported)       [DISCONTINUED] fluocinonide (LIDEX) 0.05 % external ointment Apply topically as needed  (Patient not taking: No sig reported)       [DISCONTINUED] fluticasone-vilanterol (BREO ELLIPTA) 200-25 MCG/INH inhaler Inhale 1 puff into  the lungs daily (Patient not taking: No sig reported)       [DISCONTINUED] folic acid (FOLVITE) 1 MG tablet Take 1 tablet (1 mg) by mouth daily 90 tablet 2     [DISCONTINUED] montelukast (SINGULAIR) 10 MG tablet Take 1 tablet (10 mg) by mouth At Bedtime (Patient not taking: No sig reported) 30 tablet 1     [DISCONTINUED] Multiple Vitamin (DAILY-GERALD MULTIVITAMIN) TABS Take 1 tablet by mouth daily 90 tablet 1     [DISCONTINUED] nitroFURantoin macrocrystal-monohydrate (MACROBID) 100 MG capsule Take 1 capsule (100 mg) by mouth 2 times daily (Patient not taking: No sig reported) 14 capsule 0     [DISCONTINUED] nystatin (MYCOSTATIN) 873217 UNIT/ML suspension Take 5 mLs (500,000 Units) by mouth 4 times daily (Patient not taking: No sig reported) 200 mL 0     [DISCONTINUED] ondansetron (ZOFRAN-ODT) 4 MG ODT tab Take 1 tablet (4 mg) by mouth every 8 hours as needed for nausea 12 tablet 0     [DISCONTINUED] ondansetron (ZOFRAN-ODT) 8 MG ODT tab Take 1 tablet (8 mg) by mouth every 8 hours as needed for nausea 20 tablet 1     [DISCONTINUED] terconazole (TERAZOL 3) 80 MG vaginal suppository Place 1 suppository (80 mg) vaginally At Bedtime (Patient not taking: No sig reported) 3 suppository 1     [DISCONTINUED] vitamin D2 (ERGOCALCIFEROL) 52616 units (1250 mcg) capsule Take 1 capsule (50,000 Units) by mouth once a week 4 capsule 0     [DISCONTINUED] vitamin D3 (CHOLECALCIFEROL) 50 mcg (2000 units) tablet Take 1 tablet (50 mcg) by mouth daily (Patient not taking: No sig reported) 90 tablet 1     No facility-administered encounter medications on file as of 8/19/2022.       Allergies   Allergen Reactions     Amoxicillin-Pot Clavulanate      Augmentin      Unknown possible hives and edema     Azithromycin      Diatrizoate Other (See Comments)     Pt wants this listed because she is allergic to shellfish      Imitrex [Sumatriptan]      Severe face/neck/chest tightness and flushing side effects      Penicillins Hives     Unknown       Pork Allergy      Stomach pain, cramping, diarrhea, itching, nausea and headaches     Shellfish Allergy Hives and Swelling     Sulfa Drugs Hives and Swelling     Zithromax [Azithromycin Dihydrate] Swelling     Unknown        Ph.E:    BP 93/67   Pulse 69   Wt 79.8 kg (175 lb 14.4 oz)   SpO2 97%   BMI 32.17 kg/m    GA: NAD, cooperative  HEENT: nl conj, sclera, EOMI. No campos-orbital edema  Chest: CTAB  CV: no M/R/G, RRR  Abdomen: soft, NT  MSK: no active synovitis or joint tenderness  Skin: no rash  Neuro: non focal  Psych: flat affect    Assessment/Plan:    1-Seropositive non-erosive RA (arthritis, AM stiffness, high CRP, RF 26 but re-check neg 3/2015 on HCQ) Dx 5/2013, FMS, new pleuritic CP 3/20-15-5/2015 resolved on steroids. She is on  mg bid since 5/2014. She tolerates it well and it's helping some but not enough to control all her pain. Continued having flare ups of joint pain, could be GPA related. Some pain is due to FMS.My impression is that her arthralgias are a combination of RA/ IA sec GPA, fibromyalgia and chronic pain.     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 Prednisone was not a good option for her given weight gain, diabetes. She tried and did not tolerate MTX, AZA.    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 Repeat MPO was positive in 6/2017.    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 Shah and Dr. Valdez at [facility]. 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     She is on rituximab since 12/12/2018 with great response, minor allergic reaction which could be managed by pre-meds and extra steroid dose. Developed high BG and vaginal yeast infections after solumedrol premed. Treated candida with fluconazole. Will decrease solumerdol dose to IV 80 mg prior to receiving rituximab.  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 However, feels Rituximab effects wear off around 4 months. According to ACR guidelines can give every 4-6 months. Pt elected to received this upcoming September which will be approximately 5 months from last dose. Repeat Orbit CT in September 2021 demonstrated improvement.     Last visit in April 2022-- Recommended evusheld given b cell depletion tx as rituximab blocks the response to covid vaccine, she is concerned about side effects. I advised her to discuss with [facility] pharmacist.      2-Fat pads. She was seen by endocrinology and cushing was ruled out in 4/2014. Was advised to do f/u for enlarged thyroid/thyroid nodules.  3-Hair loss. Continue with dermatology  4-Chronic pain. F/U with pain clinic  5-Vit D deficiency. Was replaced with vit D 50, 000 units po qwk x 12 wk then 2000 units every day  6-Discussed COVID vaccination, timing with rituximab to get max benefit, she had severe sore arm.  7-?HCQ toxicity on OCT 7/2021, now off HCQ, could explain increased arthralgia  8- Chronic Headaches. Symptoms worsen after taking zofran. Has received compazine in hospital in the past without adverse effect. Will have patient trial Compazine       Today's plan:    -Labs today   -Plan for repeat rituximab 1000 mg one dose in early September with less steroid (solumedrol 80 mg IV)  -Compazine as needed for nausea  -Follow up with Pharmacist to discuss potential interactions of herbal supplements with current medication regimen  -Follow up with Dr. Vernon  -[facility] follow up  -Return in 6 months (in person)      I saw and examined the patient with Dr. Mckinnon. I acted as scribe for Dr. Mckinnon.    Kamryn Murphy MS4    Attending Note: I saw and examined the patient with medical student Kamryn. This note was written by her who acted as scribe for me. I agree with findings and recommendations written in this note. The note reflects decisions made by me.          Majo Mckinnon  MD

## 2022-08-19 NOTE — NURSING NOTE
Janine Cornell is a 57 year old female patient that presents today in clinic for the following:    Chief Complaint   Patient presents with     Follow Up     Pt here for follow up     Headache     Nausea     The patient's allergies and medications were reviewed as noted. A set of vitals were recorded as noted without incident. The patient does not have any other questions for the provider.    Michelle Hills, EMT at 9:12 AM on 8/19/2022

## 2022-08-19 NOTE — PROGRESS NOTES
Medication Therapy Management (MTM) Encounter    ASSESSMENT:                            Medication Adherence/Access: No issues identified    Type 2 Diabetes: Patient is not meeting A1c goal of < 7%. Self monitoring of blood glucose is at goal of fasting  mg/dL. Offered to decrease insulin by a few units in order to decrease risk for low blood sugars but she was happy with her current control and wants to stay on the current dose. A1c still elevated, but has only been on Jardiance for 3 weeks. Will have her bring in blood sugar monitor to next visit to see how her numbers are on Jardiance as the A1c is a picture of the last 3 months.     PLAN:                            1. Decrease lantus by 3 units if you have low blood sugars.   2. Bring in meter to next appointment.     Follow-up: Return in about 1 month (around 2022) for Follow up, with me, in person.    SUBJECTIVE/OBJECTIVE:                          Janine Cornell is a 57 year old female coming in for a follow-up visit. Patient saw provider prior to our visit today. Today's visit is a follow-up MTM visit from 22     Reason for visit: diabetes follow-up.    Allergies/ADRs: Reviewed in chart  Past Medical History: Reviewed in chart  Tobacco: She reports that she has been smoking cigarettes. She has a 0.20 pack-year smoking history. She has never used smokeless tobacco.Nicotine/Tobacco Cessation Plan:   not discussed      Medication Adherence/Access: no issues reported    Type 2 Diabetes:  Currently taking Lantus 75 units every morning, Jardiance 10 mg(started at the beginning of August) , and metformin ER 2000 mg daily. Patient refuses to answer questions about side effects.  Blood sugar monitorin-3 time(s) daily. Ranges (patient reported): Reports that blood sugars have been around 120 mg/dL. Has seen it around 160-180 mg/dL.      Continuous glucose monitors have been explained to her before and is not interested in using this. She is not  "interested in meal time insulin right now because it would be something she wouldn't be able to keep track of. She feels that she would not be able to manage using mealtime insulin. Previously tried osmotic metformin and had \"severe\" stomach pain.    Symptoms of low blood sugar? Reports she has had some low blood sugars recently. Has happened twice in the last month. Gets lightheaded, sees \"TV static\"    Urine Albumin:     Lab Results   Component Value Date    UMALCR 6.87 11/15/2017      Lab Results   Component Value Date    A1C 10.3 08/19/2022    A1C 11.2 05/14/2022    A1C 10.8 02/04/2022    A1C 8.0 06/18/2020    A1C 9.2 03/06/2019    A1C 9.6 11/09/2018    A1C 8.4 11/15/2017    A1C 8.8 06/28/2017       Today's Vitals: There were no vitals taken for this visit.  ----------------      I spent 15 minutes with this patient today. All changes were made via collaborative practice agreement with Kash Solano MD. A copy of the visit note was provided to the patient's provider(s).    The patient was given a summary of these recommendations. See Provider note/AVS from today.     Nilesh Blue, Pharm. D., [facility]  Medication Therapy Management Pharmacist  Direct Voicemail: [phone]     Medication Therapy Recommendations  No medication therapy recommendations to display     "

## 2022-08-19 NOTE — NURSING NOTE
Chief Complaint   Patient presents with     RECHECK     4 month follow up     Blood pressure 93/67, pulse 69, weight 79.8 kg (175 lb 14.4 oz), SpO2 97 %, not currently breastfeeding.    Brianne Dotson

## 2022-08-19 NOTE — PROGRESS NOTES
"    Assessment & Plan     Type 2 diabetes, HbA1C goal < 8% (H)  See HPI  - Hemoglobin A1c; Future    Nonintractable headache, unspecified chronicity pattern, unspecified headache type  Discuss w/ Neuro, Neuro-eye, try Pool PT again  - Adult Neurology  Referral  - Physical Therapy Referral; Future    Neck pain  Same  - Physical Therapy Referral; Future    Low back pain of over 3 months duration  Same  - Physical Therapy Referral; Future    Multiple joint pain  Same  - Physical Therapy Referral; Future    Myalgia  Same  - Physical Therapy Referral; Future    ANCA-associated vasculitis (H)  Same  - Physical Therapy Referral; Future    Duodenal ulcer without hemorrhage or perforation and without obstruction  Try tab instead of liquid, cont ppi  - sucralfate (CARAFATE) 1 GM tablet; Take 1 tablet (1 g) by mouth 4 times daily      33 minutes spent on the date of the encounter doing chart review, history and exam, documentation and further activities per the note    BMI:   Estimated body mass index is 32.19 kg/m  as calculated from the following:    Height as of this encounter: 1.575 m (5' 2\").    Weight as of this encounter: 79.8 kg (176 lb).     Return in about 1 month (around 9/19/2022).    Kash Solano MD  [facility] [place]    Adrienne Mehta is a 57 year old, presenting for the following health issues:  Follow Up (Pt here for follow up), Headache, and Nausea      HPI Here for a few things  Here w/ son  1-HA chornic decades worse of late due for neuro f/u also seems centered around R eye/lateral area R eye, sees neuro-eye MD in a few days, saw Rheum today labs of Rheum pending  2-DM: due for labs, still too high, today I review w/ pharmD will increase jaridance consider novolog  3-Nausea: carafate helps, has liquid, thinks pills work better, also switching from zofran to phenergan as thinks zofran worsens HA  4-lots of pain besides HA, also all joints hurts, myalgis " diffusely, in past pool PT helped, discussed, she'd like to retry  5-ulcers: no recent abdomen pain, I encouraged her to keep taking the newium anyway  For immune suppressed state I encourage a covid shot and prev20, she'll think about  Past Medical History:   Diagnosis Date     Abnormal glandular Papanicolaou smear of cervix 1992     Allergic rhinitis     Allergy, airborne subst     Arthritis      ASCVD (arteriosclerotic cardiovascular disease)      Chronic pain      Chronic pancreatitis (H)     idiopathic, Tx: PPI, antioxidants, pancreatic enzymes     Common migraine      Coronary artery disease      Costochondritis      Difficulty in walking(719.7)      Dyspnea on exertion      Ectasia, mammary duct     [facility], persistent nipple discharge     Elevated fasting glucose      Gastro-oesophageal reflux disease      Granulomatosis with polyangiitis (H)      Hernia      History of angina      Hyperlipidemia LDL goal < 100      Hypertension goal BP (blood pressure) < 140/90     Essential hypertension     Iron deficiency anemia      Ischemic cardiomyopathy      Menorrhagia      Migraine headaches      Mild persistent asthma      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 Matias, neg w/u, Rx'd w/prednisone     Ventral hernia, unspecified, without mention  of obstruction or gangrene 2012     Past Surgical History:   Procedure Laterality Date     C/SECTION, LOW TRANSVERSE           CARDIAC SURGERY      cardiac stent- recent in 16; 4 other stents     DILATION AND CURETTAGE N/A 2016    Procedure: DILATION AND CURETTAGE;  Surgeon: Nahed Butler MD;  Location: [facility]     ESOPHAGOSCOPY, GASTROSCOPY, DUODENOSCOPY (EGD), COMBINED N/A 3/31/2022    Procedure: ESOPHAGOGASTRODUODENOSCOPY, WITH BIOPSY;  Surgeon: Enzo Sesay MD;  [facility]     HC UGI ENDOSCOPY W EUS  08    Dr. Pastrana, possible chronic pancreatitis, fatty liver     HERNIA REPAIR  2012    done at [facility]     INSERT INTRAUTERINE DEVICE N/A 2016    Procedure: INSERT INTRAUTERINE DEVICE;  Surgeon: Nahed Butler MD;  [facility]: [facility]     INT UTERINE FIBRIOD EMBOLIZATION  10/29/2014     LAPAROSCOPIC CHOLECYSTECTOMY  08    Dr. Arnol GRUBBS TX, CERVICAL      s/p LEEP     ORBITOTOMY Right 3/15/2016    Procedure: ORBITOTOMY;  Surgeon: Myron Cyr MD;  Location: [facility]     ORBITOTOMY Right 2017    Procedure: ORBITOTOMY;  RIGHT ORBITOTOMY AND BIOPSY;  Surgeon: Charis Holbrook MD;  Location: [facility]     REPAIR PTOSIS Right 2017    Procedure: REPAIR PTOSIS;  RIGHT UPPER LID PTOSIS REPAIR;  Surgeon: Myron Cyr MD;  Location: [facility]     UPPER GI ENDOSCOPY  10/21/08    mild gastritis, Dr. Rocky [facility] ECHO HEART XTHORACIC,COMPLETE, W/O DOPPLER  04    [facility] WNL, EF 60%     Current Outpatient Medications   Medication     acetaminophen (TYLENOL) 325 MG tablet     albuterol (2.5 MG/3ML) 0.083% neb solution     albuterol (PROAIR HFA, PROVENTIL HFA, VENTOLIN HFA) 108 (90 BASE) MCG/ACT inhaler     azelastine (ASTELIN) 0.1 % nasal spray     blood glucose (NO BRAND SPECIFIED) lancets standard     blood glucose (NO BRAND SPECIFIED) test strip     Blood Pressure Monitor KIT     calcium carbonate (TUMS) 500 MG chewable tablet      clopidogrel (PLAVIX) 75 MG tablet     COMPRESSION STOCKINGS     cyclobenzaprine (FLEXERIL) 10 MG tablet     dicyclomine (BENTYL) 20 MG tablet     diphenhydrAMINE (BENADRYL) 25 MG capsule     EPIPEN 2-RIKY 0.3 MG/0.3ML injection     Ergocalciferol (VITAMIN D2) 50 MCG (2000 UT) TABS     esomeprazole (NEXIUM) 40 MG DR capsule     fexofenadine (ALLEGRA) 180 MG tablet     fluticasone (FLONASE) 50 MCG/ACT nasal spray     folic acid (FOLVITE) 1 MG tablet     insulin glargine (LANTUS PEN) 100 UNIT/ML pen     insulin pen needle (BD MARCK U/F) 32G X 4 MM miscellaneous     ketoconazole (NIZORAL) 2 % shampoo     lisinopril-hydrochlorothiazide (ZESTORETIC) 20-25 MG tablet     magnesium 250 MG tablet     metFORMIN (GLUCOPHAGE-XR) 500 MG 24 hr tablet     metoprolol succinate ER (TOPROL XL) 100 MG 24 hr tablet     Multiple Vitamin (DAILY-GERALD MULTIVITAMIN) TABS     nitroGLYcerin (NITROSTAT) 0.4 MG sublingual tablet     olopatadine (PATANOL) 0.1 % ophthalmic solution     pravastatin (PRAVACHOL) 40 MG tablet     prochlorperazine (COMPAZINE) 5 MG tablet     sennosides (SENOKOT) 8.6 MG tablet     spironolactone (ALDACTONE) 25 MG tablet     sucralfate (CARAFATE) 1 GM tablet     terbinafine (LAMISIL) 1 % external cream     traMADol (ULTRAM) 50 MG tablet     vitamin D2 (ERGOCALCIFEROL) 46820 units (1250 mcg) capsule     ADVAIR DISKUS 250-50 MCG/ACT inhaler     empagliflozin (JARDIANCE) 10 MG TABS tablet     famotidine (PEPCID) 20 MG tablet     levocetirizine (XYZAL) 5 MG tablet     No current facility-administered medications for this visit.     Allergies   Allergen Reactions     Amoxicillin-Pot Clavulanate      Augmentin      Unknown possible hives and edema     Azithromycin      Diatrizoate Other (See Comments)     Pt wants this listed because she is allergic to shellfish      Imitrex [Sumatriptan]      Severe face/neck/chest tightness and flushing side effects      Penicillins Hives     Unknown      Pork Allergy      Stomach pain,  "cramping, diarrhea, itching, nausea and headaches     Shellfish Allergy Hives and Swelling     Sulfa Drugs Hives and Swelling     Zithromax [Azithromycin Dihydrate] Swelling     Unknown            Review of Systems         Objective    /72 (BP Location: Right arm, Patient Position: Sitting, Cuff Size: Adult Large)   Pulse 72   Temp 98.3  F (36.8  C) (Oral)   Resp 18   Ht 1.575 m (5' 2\")   Wt 79.8 kg (176 lb)   SpO2 99%   BMI 32.19 kg/m    Body mass index is 32.19 kg/m .  Physical Exam   GENERAL: healthy, alert and no distress  MS: no gross musculoskeletal defects noted, no edema          .  ..        "

## 2022-08-22 ENCOUNTER — ANCILLARY PROCEDURE (OUTPATIENT)
Dept: CT IMAGING | Facility: CLINIC | Age: 57
End: 2022-08-22
Attending: OPHTHALMOLOGY
Payer: COMMERCIAL

## 2022-08-22 ENCOUNTER — OFFICE VISIT (OUTPATIENT)
Dept: OPHTHALMOLOGY | Facility: CLINIC | Age: 57
End: 2022-08-22
Attending: OPHTHALMOLOGY
Payer: COMMERCIAL

## 2022-08-22 DIAGNOSIS — H05.10 IDIOPATHIC ORBITAL INFLAMMATORY SYNDROME: Primary | ICD-10-CM

## 2022-08-22 DIAGNOSIS — Z79.899 OTHER LONG TERM (CURRENT) DRUG THERAPY: ICD-10-CM

## 2022-08-22 DIAGNOSIS — H47.211 PRIMARY OPTIC ATROPHY OF RIGHT EYE: ICD-10-CM

## 2022-08-22 DIAGNOSIS — Z79.899 OTHER LONG TERM (CURRENT) DRUG THERAPY: Primary | ICD-10-CM

## 2022-08-22 DIAGNOSIS — I77.82 ANCA-ASSOCIATED VASCULITIS (H): ICD-10-CM

## 2022-08-22 LAB
ANCA AB PATTERN SER IF-IMP: NORMAL
C-ANCA TITR SER IF: NORMAL {TITER}
IGA SERPL-MCNC: 204 MG/DL (ref 84–499)
IGG SERPL-MCNC: 641 MG/DL (ref 610–1616)
IGM SERPL-MCNC: 32 MG/DL (ref 35–242)

## 2022-08-22 PROCEDURE — 92133 CPTRZD OPH DX IMG PST SGM ON: CPT | Performed by: OPHTHALMOLOGY

## 2022-08-22 PROCEDURE — 92082 INTERMEDIATE VISUAL FIELD XM: CPT | Performed by: OPHTHALMOLOGY

## 2022-08-22 PROCEDURE — G0463 HOSPITAL OUTPT CLINIC VISIT: HCPCS | Mod: 25 | Performed by: TECHNICIAN/TECHNOLOGIST

## 2022-08-22 PROCEDURE — 92014 COMPRE OPH EXAM EST PT 1/>: CPT | Mod: GC | Performed by: OPHTHALMOLOGY

## 2022-08-22 PROCEDURE — 92134 CPTRZ OPH DX IMG PST SGM RTA: CPT | Performed by: OPHTHALMOLOGY

## 2022-08-22 PROCEDURE — 99207 OCT OPTIC NERVE RNFL SPECTRALIS OU (BOTH EYES): CPT | Mod: 26 | Performed by: OPHTHALMOLOGY

## 2022-08-22 PROCEDURE — 92082 INTERMEDIATE VISUAL FIELD XM: CPT | Mod: 26 | Performed by: OPHTHALMOLOGY

## 2022-08-22 PROCEDURE — 70480 CT ORBIT/EAR/FOSSA W/O DYE: CPT | Mod: GC | Performed by: STUDENT IN AN ORGANIZED HEALTH CARE EDUCATION/TRAINING PROGRAM

## 2022-08-22 PROCEDURE — 92134 CPTRZ OPH DX IMG PST SGM RTA: CPT | Mod: 26 | Performed by: OPHTHALMOLOGY

## 2022-08-22 ASSESSMENT — VISUAL ACUITY
METHOD: SNELLEN - LINEAR
OD_CC+: -2
CORRECTION_TYPE: GLASSES
OS_CC: 20/20
OD_CC: 20/20

## 2022-08-22 ASSESSMENT — CUP TO DISC RATIO
OS_RATIO: 0.2
OD_RATIO: 0.1

## 2022-08-22 ASSESSMENT — CONF VISUAL FIELD
METHOD: COUNTING FINGERS
OS_NORMAL: 1
OD_NORMAL: 1

## 2022-08-22 ASSESSMENT — REFRACTION_MANIFEST
OS_CYLINDER: +1.00
OD_SPHERE: -3.25
OD_CYLINDER: +1.25
OS_SPHERE: -3.50
OS_AXIS: 170
OD_AXIS: 165

## 2022-08-22 ASSESSMENT — EXTERNAL EXAM - LEFT EYE: OS_EXAM: NORMAL

## 2022-08-22 ASSESSMENT — TONOMETRY
OS_IOP_MMHG: 22
IOP_METHOD: ICARE
OD_IOP_MMHG: 21

## 2022-08-22 ASSESSMENT — SLIT LAMP EXAM - LIDS
COMMENTS: NORMAL
COMMENTS: NORMAL

## 2022-08-22 ASSESSMENT — EXTERNAL EXAM - RIGHT EYE: OD_EXAM: NORMAL

## 2022-08-22 NOTE — PROGRESS NOTES
Assessment & Plan     Janine Cornell is a 57 year old female with the following diagnoses:   1. Idiopathic orbital inflammatory syndrome    2. Other long term (current) drug therapy    3. Primary optic atrophy of right eye    4. ANCA-associated vasculitis (H)       GPA Dx 9/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 She has h/o + RF RA, FMS. She is on HCQ since 5/2014, which was stopped in 6/2021 with concern for OCT changes. On rituximab since 12/20218 with great response. Previously tried and did not tolerate MTX, AZA.    Since last visit 9/2021 she notes ongoing headache, sensitivity to light, and pain in the eyes (OD>OS) that worsens with eye movement. She was wearing transitions lenses for a time, which seemed to lose their efficacy over time. She was subsequently wearing lightly tinted glasses, which did not seem dark enough to help with light sensitivity.      OCT RNFL shows stable superior thinning OD, essentially normal thickness OS. OCT Macula with stable normal architecture. OVF with improved inferonasal depression from 9/2021.    It is my impression that patient has no evidence of plaquenil toxicity.  If needed, then she could restart plaquenil.  She has optic atrophy RIGHT eye secondary to her previous orbital inflammatory syndrome, which remains quiescent today.         She has pain today and her last CT orbit showed improving orbital pseudotumor. Will obtain repeat CT orbit today.      Will give new prescription with FL41 moderate tint.             Attending Physician Attestation:  Complete documentation of historical and exam elements from today's encounter can be found in the full encounter summary report (not reduplicated in this progress note).  I personally obtained the chief complaint(s) and history of present illness.  I confirmed and edited as necessary the review of systems, past medical/surgical history, family history, social history, and examination findings as  documented by others; and I examined the patient myself.  I personally reviewed the relevant tests, images, and reports as documented above.  I formulated and edited as necessary the assessment and plan and discussed the findings and management plan with the patient and family. I personally reviewed the ophthalmic test(s) associated with this encounter, agree with the interpretation(s) as documented by the resident/fellow, and have edited the corresponding report(s) as necessary.  - Jas Giron MD   PGY5

## 2022-08-22 NOTE — PATIENT INSTRUCTIONS
"Recommendations from today's MTM visit:                                                       1. Decrease lantus by 3 units if you have low blood sugars.   2. Bring in meter to next appointment.     Follow-up: Return in about 1 month (around 9/21/2022) for Follow up, with me, in person.    It was great speaking with you today.  I value your experience and would be very thankful for your time in providing feedback in our clinic survey. In the next few days, you may receive an email or text message from [employer] with a link to a survey related to your  clinical pharmacist.\"     To schedule another MTM appointment, please call the clinic directly or you may call the MTM scheduling line at [phone] or toll-free at [phone].     My Clinical Pharmacist's contact information:                                                      Please feel free to contact me with any questions or concerns you have.      Nilesh Blue, Pharm. D., BCACP  Medication Therapy Management Pharmacist  Direct Voicemail: [phone]     "

## 2022-08-24 ENCOUNTER — TELEPHONE (OUTPATIENT)
Dept: INTERNAL MEDICINE | Facility: CLINIC | Age: 57
End: 2022-08-24

## 2022-08-24 ENCOUNTER — TELEPHONE (OUTPATIENT)
Dept: RHEUMATOLOGY | Facility: CLINIC | Age: 57
End: 2022-08-24

## 2022-08-24 DIAGNOSIS — M79.7 FIBROMYALGIA: Primary | ICD-10-CM

## 2022-08-24 DIAGNOSIS — M06.00 SERONEGATIVE RHEUMATOID ARTHRITIS (H): ICD-10-CM

## 2022-08-24 NOTE — TELEPHONE ENCOUNTER
From received from patient for [facility] - Request for Medical opinion. Patient stated there is no change form when the form was completed in the past.     Form has been completed, signed and faxed to [facility] at [phone]. Copy has been placed in the mail to the patient.      Beulah Fortune CMA   8/24/2022 2:05 PM

## 2022-08-24 NOTE — TELEPHONE ENCOUNTER
Pain referral placed. Pt will be contacted for the scheduling.    [facility]  ----------------------------------------------------------------------------        ----- Message from Kash Solano MD sent at 8/24/2022  8:25 AM CDT -----  Can you see if Janine could get to [place] [facility] in our system? If so place order she was interested in acupuncture I guess they have good provider there  ----- Message -----  From: Paige Tiwari APRN CNP  Sent: 8/23/2022  11:57 AM CDT  To: MD Omer Dobbins - I am not sure if my previous message to you was sent or not... weird!    Anyhow, if this is a repeat, sorry!    Viry Salcido out of the [facility] is excellent. She is on maternity leave right now, but it is my understanding she has someone covering her. Put in a pain management  referral and select acupuncture. I think patients have to call to schedule, and the number should be on the referral.     If [place] is too far, there is a great provider out of [place], but I cannot find her contact info at the moment. She is external, so let me know if I need to hunt the info down for you.     Hope you're having a great week so far!    Paige   ----- Message -----  From: Kash Solano MD  Sent: 8/19/2022   9:40 AM CDT  To: KAYLYNN Sarah CNP hope it's all good  Question: she has autoimmune disease, lots of pain  Acupuncture has helped  Do we have somewhere in our system for that?  Jeri Vu

## 2022-08-26 ENCOUNTER — TELEPHONE (OUTPATIENT)
Dept: OPHTHALMOLOGY | Facility: CLINIC | Age: 57
End: 2022-08-26

## 2022-08-26 NOTE — TELEPHONE ENCOUNTER
Spoke to patient regarding CT results showing no change since last year.  Will make her a copy of her report as it did show some dental concerns that she can discuss with her dentist.      Angela Pretty on 8/26/2022 at 3:31 PM

## 2022-08-30 ENCOUNTER — TELEPHONE (OUTPATIENT)
Dept: PHARMACY | Facility: CLINIC | Age: 57
End: 2022-08-30

## 2022-08-30 DIAGNOSIS — B37.31 CANDIDIASIS OF VAGINA: Primary | ICD-10-CM

## 2022-08-30 DIAGNOSIS — M31.30 GRANULOMATOSIS WITH POLYANGIITIS WITHOUT RENAL INVOLVEMENT (H): Primary | ICD-10-CM

## 2022-08-30 NOTE — TELEPHONE ENCOUNTER
PHARMACY TELEPHONE ENCOUNTER:     Reason: Schedule visit     Reports that she has been doing well with blood sugars on the Jardiance and got a yeast infection. Will route to PCP. Will recommend treating yeast infection and discontinuing SGLT2 if this problem occurs again as patient would like to stay on the medication due to its efficacy.      Nilesh Blue, Pharm. D.   Medication Therapy Management Pharmacist

## 2022-08-31 RX ORDER — FLUCONAZOLE 150 MG/1
150 TABLET ORAL ONCE
Qty: 1 TABLET | Refills: 1 | Status: SHIPPED | OUTPATIENT
Start: 2022-08-31 | End: 2022-08-31

## 2022-08-31 NOTE — TELEPHONE ENCOUNTER
Ok to treat yeast infection sight unseen, ok Difulcan 150 mg tab one po times one one refill re: vaginital yeast infection

## 2022-09-01 DIAGNOSIS — M19.90 INFLAMMATORY ARTHRITIS: ICD-10-CM

## 2022-09-01 RX ORDER — MULTIVITAMIN WITH IRON
TABLET ORAL
Qty: 60 TABLET | Refills: 3 | Status: SHIPPED | OUTPATIENT
Start: 2022-09-01 | End: 2023-12-07

## 2022-09-01 NOTE — TELEPHONE ENCOUNTER
Medication/Dose: magnesium 250 MG tablet    Last Written : 8/1/22  Last Quantity: 60, # refills: 0  Last Office Visit :  8/19/22  Pending appointment: None    Prescription not on Protocol, and routed to provider    ISHA FinleyN, RN

## 2022-09-02 DIAGNOSIS — B37.31 VAGINAL YEAST INFECTION: ICD-10-CM

## 2022-09-02 DIAGNOSIS — N76.0 VAGINITIS AND VULVOVAGINITIS: ICD-10-CM

## 2022-09-02 DIAGNOSIS — B37.0 THRUSH: ICD-10-CM

## 2022-09-06 RX ORDER — FLUCONAZOLE 200 MG/1
200 TABLET ORAL DAILY
Qty: 14 TABLET | Refills: 0 | OUTPATIENT
Start: 2022-09-06

## 2022-09-06 RX ORDER — TERCONAZOLE 80 MG/1
80 SUPPOSITORY VAGINAL AT BEDTIME
Qty: 3 SUPPOSITORY | Refills: 1 | Status: CANCELLED | OUTPATIENT
Start: 2022-09-06

## 2022-09-06 NOTE — TELEPHONE ENCOUNTER
8/19/2022  [facility] Kash Rivre MD    Family Medicine     terconazole (TERAZOL 3) 80 MG vaginal suppository      Last Written Prescription Date:  2/15/22  Last Fill Quantity: 3   # refills:1      Routing refill request to provider for review/approval because:  terconazole End date 8/19/22 by other provider.

## 2022-09-07 ENCOUNTER — INFUSION THERAPY VISIT (OUTPATIENT)
Dept: INFUSION THERAPY | Facility: CLINIC | Age: 57
End: 2022-09-07
Attending: INTERNAL MEDICINE
Payer: COMMERCIAL

## 2022-09-07 VITALS
SYSTOLIC BLOOD PRESSURE: 121 MMHG | OXYGEN SATURATION: 98 % | DIASTOLIC BLOOD PRESSURE: 75 MMHG | WEIGHT: 173.3 LBS | TEMPERATURE: 98.7 F | RESPIRATION RATE: 16 BRPM | HEART RATE: 75 BPM | BODY MASS INDEX: 31.7 KG/M2

## 2022-09-07 DIAGNOSIS — M31.30 GRANULOMATOSIS WITH POLYANGIITIS WITHOUT RENAL INVOLVEMENT (H): ICD-10-CM

## 2022-09-07 DIAGNOSIS — I77.82 ANCA-ASSOCIATED VASCULITIS (H): Primary | ICD-10-CM

## 2022-09-07 LAB
CREAT SERPL-MCNC: 1.35 MG/DL (ref 0.52–1.04)
GFR SERPL CREATININE-BSD FRML MDRD: 46 ML/MIN/1.73M2

## 2022-09-07 PROCEDURE — 250N000013 HC RX MED GY IP 250 OP 250 PS 637: Performed by: INTERNAL MEDICINE

## 2022-09-07 PROCEDURE — 96415 CHEMO IV INFUSION ADDL HR: CPT

## 2022-09-07 PROCEDURE — 36415 COLL VENOUS BLD VENIPUNCTURE: CPT

## 2022-09-07 PROCEDURE — 999N000285 HC STATISTIC VASC ACCESS LAB DRAW WITH PIV START

## 2022-09-07 PROCEDURE — 999N000127 HC STATISTIC PERIPHERAL IV START W US GUIDANCE

## 2022-09-07 PROCEDURE — 258N000003 HC RX IP 258 OP 636: Performed by: INTERNAL MEDICINE

## 2022-09-07 PROCEDURE — 96375 TX/PRO/DX INJ NEW DRUG ADDON: CPT

## 2022-09-07 PROCEDURE — 250N000011 HC RX IP 250 OP 636: Performed by: INTERNAL MEDICINE

## 2022-09-07 PROCEDURE — 82565 ASSAY OF CREATININE: CPT

## 2022-09-07 PROCEDURE — 96367 TX/PROPH/DG ADDL SEQ IV INF: CPT

## 2022-09-07 PROCEDURE — 96413 CHEMO IV INFUSION 1 HR: CPT

## 2022-09-07 RX ORDER — ACETAMINOPHEN 325 MG/1
650 TABLET ORAL ONCE
Status: CANCELLED
Start: 2022-09-21

## 2022-09-07 RX ORDER — METHYLPREDNISOLONE SODIUM SUCCINATE 125 MG/2ML
80 INJECTION, POWDER, LYOPHILIZED, FOR SOLUTION INTRAMUSCULAR; INTRAVENOUS ONCE
Status: CANCELLED | OUTPATIENT
Start: 2022-09-21

## 2022-09-07 RX ORDER — EPINEPHRINE 1 MG/ML
0.3 INJECTION, SOLUTION, CONCENTRATE INTRAVENOUS EVERY 5 MIN PRN
Status: CANCELLED | OUTPATIENT
Start: 2022-09-21

## 2022-09-07 RX ORDER — HEPARIN SODIUM (PORCINE) LOCK FLUSH IV SOLN 100 UNIT/ML 100 UNIT/ML
5 SOLUTION INTRAVENOUS
Status: CANCELLED | OUTPATIENT
Start: 2022-09-21

## 2022-09-07 RX ORDER — DIPHENHYDRAMINE HYDROCHLORIDE 50 MG/ML
50 INJECTION INTRAMUSCULAR; INTRAVENOUS
Status: CANCELLED
Start: 2022-09-21

## 2022-09-07 RX ORDER — HEPARIN SODIUM,PORCINE 10 UNIT/ML
5 VIAL (ML) INTRAVENOUS
Status: CANCELLED | OUTPATIENT
Start: 2022-09-21

## 2022-09-07 RX ORDER — METHYLPREDNISOLONE SODIUM SUCCINATE 125 MG/2ML
125 INJECTION, POWDER, LYOPHILIZED, FOR SOLUTION INTRAMUSCULAR; INTRAVENOUS
Status: CANCELLED
Start: 2022-09-21

## 2022-09-07 RX ORDER — ALBUTEROL SULFATE 90 UG/1
1-2 AEROSOL, METERED RESPIRATORY (INHALATION)
Status: CANCELLED
Start: 2022-09-21

## 2022-09-07 RX ORDER — ACETAMINOPHEN 325 MG/1
650 TABLET ORAL ONCE
Status: COMPLETED | OUTPATIENT
Start: 2022-09-07 | End: 2022-09-07

## 2022-09-07 RX ORDER — MEPERIDINE HYDROCHLORIDE 25 MG/ML
25 INJECTION INTRAMUSCULAR; INTRAVENOUS; SUBCUTANEOUS EVERY 30 MIN PRN
Status: CANCELLED | OUTPATIENT
Start: 2022-09-21

## 2022-09-07 RX ORDER — METHYLPREDNISOLONE SODIUM SUCCINATE 125 MG/2ML
80 INJECTION, POWDER, LYOPHILIZED, FOR SOLUTION INTRAMUSCULAR; INTRAVENOUS ONCE
Status: COMPLETED | OUTPATIENT
Start: 2022-09-07 | End: 2022-09-07

## 2022-09-07 RX ORDER — ALBUTEROL SULFATE 0.83 MG/ML
2.5 SOLUTION RESPIRATORY (INHALATION)
Status: CANCELLED | OUTPATIENT
Start: 2022-09-21

## 2022-09-07 RX ADMIN — RITUXIMAB-ABBS 1000 MG: 10 INJECTION, SOLUTION INTRAVENOUS at 11:06

## 2022-09-07 RX ADMIN — ACETAMINOPHEN 650 MG: 325 TABLET, FILM COATED ORAL at 09:48

## 2022-09-07 RX ADMIN — METHYLPREDNISOLONE SODIUM SUCCINATE 81.25 MG: 125 INJECTION, POWDER, FOR SOLUTION INTRAMUSCULAR; INTRAVENOUS at 09:50

## 2022-09-07 RX ADMIN — DIPHENHYDRAMINE HYDROCHLORIDE 50 MG: 50 INJECTION, SOLUTION INTRAMUSCULAR; INTRAVENOUS at 09:55

## 2022-09-07 ASSESSMENT — PAIN SCALES - GENERAL: PAINLEVEL: MILD PAIN (3)

## 2022-09-07 NOTE — LETTER
September 9, 2022      Janine Cornell  [address] [facility] [address]        Dear ,    We are writing to inform you of your test results.    Kidney function is still slightly down. Could I refer you to nephrology?    Resulted Orders   Creatinine   Result Value Ref Range    Creatinine 1.35 (H) 0.52 - 1.04 mg/dL    GFR Estimate 46 (L) >60 mL/min/1.73m2      Comment:      Effective December 21, 2021 eGFRcr in adults is calculated using the 2021 CKD-EPI creatinine equation which includes age and gender ( et al., NEJM, DOI: 10.1056/VFHTsf8731100)       If you have any questions or concerns, please call the clinic at the number listed above.       Sincerely,      Majo Mckinnon MD

## 2022-09-07 NOTE — PROGRESS NOTES
Infusion Nursing Note:  Janine Cornell presents today for rituxan.    Patient seen by provider today: No   present during visit today: Not Applicable.    Note: No new concerns at this time.    Intravenous Access:  Peripheral IV placed X2, causing pain even with good blood return.  Vascular access called to place IV and lab draw.    Treatment Conditions:  Biological Infusion Checklist:  ~~~ NOTE: If the patient answers yes to any of the questions below, hold the infusion and contact ordering provider or on-call provider.    1. Have you recently had an elevated temperature, fever, chills, productive cough, coughing for 3 weeks or longer or hemoptysis, abnormal vital signs, night sweats,  chest pain or have you noticed a decrease in your appetite, unexplained weight loss or fatigue? No  2. Do you have any open wounds or new incisions? No  3. Do you have any recent or upcoming hospitalizations, surgeries or dental procedures? No  4. Do you currently have or recently have had any signs of illness or infection or are you on any antibiotics? No  5. Have you had any new, sudden or worsening abdominal pain? No  6. Have you or anyone in your household received a live vaccination in the past 4 weeks? Please note:  No live vaccines while on biologic/chemotherapy until 6 months after the last treatment.  Patient can receive the flu vaccine (shot only) and the pneumovax.  It is optimal for the patient to get these vaccines mid cycle, but they can be given at any time as long as it is not on the day of the infusion. No  7. Have you recently been diagnosed with any new nervous system diseases (ie. Multiple sclerosis, Guillain [place], seizures, neurological changes) or cancer diagnosis? No  8. Are you on any form of radiation or chemotherapy? No  9. Are you pregnant or breast feeding or do you have plans of pregnancy in the future? No  10. Have you been having any signs of worsening depression or suicidal ideations?   (benlysta only) No  11. Have there been any other new onset medical symptoms? No      Post Infusion Assessment:  Premeds given and Rituxan started at 50ml/hr and titrated to max rate of 200ml/hr  Patient tolerated infusion without incident.  Blood return noted pre and post infusion.  Site patent and intact, free from redness, edema or discomfort.  No evidence of extravasations.  Access discontinued per protocol.  Biologic Infusion Post Education: Call the triage nurse at your clinic or seek medical attention if you have chills and/or temperature greater than or equal to 100.5, uncontrolled nausea/vomiting, diarrhea, constipation, dizziness, shortness of breath, chest pain, heart palpitations, weakness or any other new or concerning symptoms, questions or concerns.  You cannot have any live virus vaccines prior to or during treatment or up to 6 months post infusion.  If you have an upcoming surgery, medical procedure or dental procedure during treatment, this should be discussed with your ordering physician and your surgeon/dentist.  If you are having any concerning symptom, if you are unsure if you should get your next infusion or wish to speak to a provider before your next infusion, please call your care coordinator or triage nurse at your clinic to notify them so we can adequately serve you.     Discharge Plan:   Patient discharged in stable condition accompanied by: self.  Departure Mode: Ambulatory.  Will follow up with Dr. Mckinnon.    Nahed Yang RN

## 2022-09-13 NOTE — TELEPHONE ENCOUNTER
9/14/2022-Request for images faxed to [facility]-MR @ 718am  -[facility] Images now in PACS-MR @ 303pm    FUTURE VISIT INFORMATION      FUTURE VISIT INFORMATION:    Date: 9/15/2022    Time: 10am    Location: [facility] [place] [facility]  REFERRAL INFORMATION:    Referring provider:  Dr. Solano    Referring providers clinic:  [facility]     Reason for visit/diagnosis  Headaches     RECORDS REQUESTED FROM:       Clinic name Comments Records Status Imaging Status   Internal Dr. Solano-8/19/2022    MR Brain-5/9/2017    CT Head-3/6/2019    [facility]         [facility]  Care Everywhere Requested

## 2022-09-14 ENCOUNTER — TELEPHONE (OUTPATIENT)
Dept: INTERNAL MEDICINE | Facility: CLINIC | Age: 57
End: 2022-09-14

## 2022-09-14 DIAGNOSIS — N76.0 VAGINITIS AND VULVOVAGINITIS: ICD-10-CM

## 2022-09-14 DIAGNOSIS — R94.4 ABNORMAL RENAL FUNCTION TEST: Primary | ICD-10-CM

## 2022-09-14 NOTE — TELEPHONE ENCOUNTER
Dr. Solano    You recommended to refer the pt to nephrology due to declined kidney function.    Pt declined the referral saying, her kidney function is declined due to jardiance and sh will discuss with PCP next week during the appt.    She also thinks she is having yeast infection from jardiance and requests terconazole (TERAZOL 3) 80 MG vaginal suppository.  Rx is pended for your review.    Soon-[facility]   STEROTACTIC LOC BREAST BIOPSY RIGHT  11/17/2020    ISH STEROTACTIC LOC BREAST BIOPSY RIGHT 11/17/2020 [facility] Bishnu Olesya Boucher [id number]    TUBAL LIGATION  1978    YAG CAPSULOTOMY Left 11/16/2010    YAG CAPSULOTOMY Right 12/21/2010       Current Outpatient Medications   Medication Sig Dispense Refill    valsartan-hydroCHLOROthiazide (DIOVAN-HCT) 320-25 MG per tablet TAKE 1 TABLET BY MOUTH DAILY 90 tablet 3    omeprazole (PRILOSEC) 40 MG delayed release capsule TAKE 1 CAPSULE BY MOUTH DAILY 90 capsule 1    atorvastatin (LIPITOR) 10 MG tablet TAKE 1 TABLET BY MOUTH EVERY NIGHT 90 tablet 3    denosumab (PROLIA) 60 MG/ML SOSY SC injection Inject 1 mL into the skin every 6 months 1 mL 1    olopatadine (PATADAY) 0.2 % SOLN ophthalmic solution Place 1 drop into both eyes daily       Brimonidine Tartrate 0.025 % SOLN Place 1 drop into both eyes daily       fluticasone (FLONASE) 50 MCG/ACT nasal spray 1 spray by Each Nostril route daily (Patient taking differently: 1 spray by Each Nostril route daily as needed ) 2 Bottle 1    ketoconazole (NIZORAL) 2 % cream Apply topically daily as needed Apply topically daily.  Cholecalciferol (VITAMIN D3) 2000 UNITS CAPS TAKE 1 CAPSULE BY MOUTH DAILY 30 capsule 0    Omega-3 Fatty Acids (FISH OIL PO) Take  by mouth daily.  polyethyl glycol-propyl glycol 0.4-0.3 % (SYSTANE) 0.4-0.3 % ophthalmic solution Place 1 drop into both eyes as needed for Dry Eyes       multivitamin (THERAGRAN) per tablet Take 1 tablet by mouth daily.  Calcium Carbonate-Vit D-Min (CALTRATE 600+D PLUS) 600-400 MG-UNIT TABS Take  by mouth 2 times daily. No current facility-administered medications for this visit.        Social History     Socioeconomic History    Marital status:      Spouse name: Karolina Ricks Number of children: 3    Years of education: Not on file    Highest education level: Not on file   Occupational History    Occupation: Clerical     Comment: Ana Garcia retiring  end     Employer: [employer] Occupation: retired   Tobacco Use    Smoking status: Passive Smoke Exposure - Never Smoker    Smokeless tobacco: Never Used    Tobacco comment: exposed to second hand smoke   Vaping Use    Vaping Use: Never used   Substance and Sexual Activity    Alcohol use: No    Drug use: Never    Sexual activity: Yes     Partners: Male   Other Topics Concern    Not on file   Social History Narrative     3 living children , 1 daughter in [place]    2 others in area    Her kids Dad     Her second  has been in kids lives for 36 + yrs. Daughter 50,  suddenly 17    Mother  last July age 80, dementia        Pt does not drive. Takes bus. Lives in [place]. [facility]     Social Determinants of Health     Financial Resource Strain: Medium Risk    Difficulty of Paying Living Expenses: Somewhat hard   Food Insecurity: No Food Insecurity    Worried About Running Out of Food in the Last Year: Never true    Roshan of Food in the Last Year: Never true   Transportation Needs:     Lack of Transportation (Medical): Not on file    Lack of Transportation (Non-Medical):  Not on file   Physical Activity:     Days of Exercise per Week: Not on file    Minutes of Exercise per Session: Not on file   Stress:     Feeling of Stress : Not on file   Social Connections:     Frequency of Communication with Friends and Family: Not on file    Frequency of Social Gatherings with Friends and Family: Not on file    Attends Bahai Services: Not on file    Active Member of Clubs or Organizations: Not on file    Attends Club or Organization Meetings: Not on file    Marital Status: Not on file   Intimate Partner Violence:     Fear of Current or Ex-Partner: Not on file    Emotionally Abused: Not on file    Physically Abused: Not on file    Sexually Abused: Not on file   Housing Stability:     Unable to Pay for Housing in the Last Year: Not on file    Number of Places Lived in the Last Year: Not on file    Unstable Housing in the Last Year: Not on file       Objective:   Physical Exam    Left breast - 1 cm mass upper inner quadrant, nontender, no ecchymosis. 8 mm mass 5:00, 2 cmfn. Right breast - Normal contour, no masses, no nipple changes. No cervical or axillary adenopathy. Assessment:      Diagnosis Orders   1. Hematoma of left breast     2. Mass of lower outer quadrant of left breast            Plan:     Mammogram was reviewed. Palpable masses are stable. At the present time, there is no concern for breast cancer. Continue monthly self breast exam, f/u with me in 6 months with mammogram.      On this date 06/13/22 I have spent 20 minutes reviewing previous notes, test results, and face to face with the patient discussing the diagnosis and importance of compliance with the treatment plan as well as documenting on the day of the visit.      Electronically signed by Michael Holt MD on 6/13/2022 at 2:12 PM

## 2022-09-15 ENCOUNTER — PRE VISIT (OUTPATIENT)
Dept: NEUROLOGY | Facility: CLINIC | Age: 57
End: 2022-09-15

## 2022-09-15 ENCOUNTER — OFFICE VISIT (OUTPATIENT)
Dept: NEUROLOGY | Facility: CLINIC | Age: 57
End: 2022-09-15
Payer: COMMERCIAL

## 2022-09-15 VITALS
WEIGHT: 174.5 LBS | OXYGEN SATURATION: 98 % | BODY MASS INDEX: 31.92 KG/M2 | DIASTOLIC BLOOD PRESSURE: 83 MMHG | SYSTOLIC BLOOD PRESSURE: 116 MMHG | HEART RATE: 76 BPM

## 2022-09-15 DIAGNOSIS — G89.4 CHRONIC PAIN SYNDROME: Primary | ICD-10-CM

## 2022-09-15 DIAGNOSIS — G43.709 CHRONIC MIGRAINE WITHOUT AURA WITHOUT STATUS MIGRAINOSUS, NOT INTRACTABLE: ICD-10-CM

## 2022-09-15 PROCEDURE — 99204 OFFICE O/P NEW MOD 45 MIN: CPT | Performed by: NURSE PRACTITIONER

## 2022-09-15 RX ORDER — RIBOFLAVIN (VITAMIN B2) 100 MG
200 TABLET ORAL DAILY
Qty: 30 TABLET | Refills: 11 | Status: SHIPPED | OUTPATIENT
Start: 2022-09-15 | End: 2023-03-09

## 2022-09-15 RX ORDER — TERCONAZOLE 80 MG/1
80 SUPPOSITORY VAGINAL AT BEDTIME
Qty: 3 SUPPOSITORY | Refills: 1 | Status: SHIPPED | OUTPATIENT
Start: 2022-09-15 | End: 2023-04-25

## 2022-09-15 ASSESSMENT — PAIN SCALES - GENERAL: PAINLEVEL: MILD PAIN (3)

## 2022-09-15 NOTE — PATIENT INSTRUCTIONS
Headache prevention options reviewed-  Amitriptyline or Topiramate, Cefaly device, Qulipta   First step-Acupuncture referral and vitamin B2 200 mg daily for migraine headache prevention-per patient's choice.     Headache rescue treatment as needed -Ubrelvy  -see information provided     Return-send [employer] message or call after researching treatment options

## 2022-09-15 NOTE — PROGRESS NOTES
"    Adrienne Mehta is a 57 year old presenting for the following health issues:  Consult (New headache)  Initial Headache Clinic visit 5/8/2013, see note for details. Patient was seen at [facility] for headache management many years ago. History of a chronic pain interfering with her life and daily activities.   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   Headaches are ongoing at least half of the month. Localized -\"different places\" but also on the \"right\" side and \"painful\"   Cannot be out in the day light-very light sensitive, noise and smell sensitive, nausea.   Headaches since very young  FH-mother and sister -headaches  Headache treatment -Tried Botox and headache significantly increased pain after getting Botox -never will do it again   Patient reports that she is not interested in medications with a short safety history including newly FDA approved medications-CGRP  Not interested in taking in anything that would cause a \"bunch of \"side effects.   Patient would like to do her own research and information provided with options -traditional and new treatments available.   Does not like to be a quinea pig for medications. Wonders of alternative approaches.   Severe reaction to sumatriptan and zofran ODT -reaction triggers headache but regular ondansetron Ok.   Gabapentin -made headaches worse  Lyrica-possible side effects  Fatigue due to chronic pain and underlined   Acupuncture in the past and helpful and stopped because insurance limited coverage  Patient is more of the natural type of person and does not respond to western medications.     Headache prevention options reviewed-  Amitriptyline or Topiramate, Cefaly device, Qulipta or would referr to [facility] with Jaida" Jessie PharmD    First step-Acupuncture referral and vitamin B2 200 mg daily for migraine headache prevention-per patient's choice.     Headache rescue treatment as needed -Ubrelvy  -see information provided     Return-send [employer] message or call after researching treatment options    Chronic pain syndrome-headaches, neck. History of spinal stenosis -talk to Dr Solano       Objective    /83 (BP Location: Right arm, Patient Position: Sitting, Cuff Size: Adult Regular)   Pulse 76   Wt 79.2 kg (174 lb 8 oz)   SpO2 98%   BMI 31.92 kg/m    Body mass index is 31.92 kg/m .  Physical Exam   GENERAL: healthy, alert and no distress  NECK: no adenopathy, no asymmetry, masses, or scars and thyroid normal to palpation  NEURO: Normal strength and tone, sensory exam grossly normal, mentation intact, speech normal, cranial nerves 2-12 intact, DTR's normal and symmetric  and Romberg normal  PSYCH: mentation appears normal, affect normal    I discussed all my recommendations with Janine Cornell who verbalizes understanding and comfortable with the plan.  All of patient's questions were answered from the best of my knowledge.  Patient is in agreement with the plan.     45 minutes spent on the date of the encounter doing video access, chart  review, exam, results review,  meds review, treatment plan, documentation and further activities as noted above    KAYLYNN Dejesus, CNP [facility]  Headache certified  [facility]

## 2022-09-15 NOTE — LETTER
"9/15/2022       RE: Janine Cornell  [address]  [facility] [address]     Dear Colleague,    Thank you for referring your patient, Janine Cornell, to the [facility] [place] at [facility]. Please see a copy of my visit note below.      Subjective   Janine is a 57 year old presenting for the following health issues:  Consult (New headache)  Initial Headache Clinic visit 5/8/2013, see note for details. Patient was seen at [facility] for headache management many years ago. History of a chronic pain interfering with her life and daily activities.   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   Headaches are ongoing at least half of the month. Localized -\"different places\" but also on the \"right\" side and \"painful\"   Cannot be out in the day light-very light sensitive, noise and smell sensitive, nausea.   Headaches since very young  FH-mother and sister -headaches  Headache treatment -Tried Botox and headache significantly increased pain after getting Botox -never will do it again   Patient reports that she is not interested in medications with a short safety history including newly FDA approved medications-CGRP  Not interested in taking in anything that would cause a \"bunch of \"side effects.   Patient would like to do her own research and information provided with options -traditional and new treatments available.   Does not like to be a quinea pig for medications. Wonders of alternative approaches.   Severe reaction to sumatriptan and zofran ODT -reaction triggers headache but regular ondansetron Ok.   Gabapentin -made headaches worse  Lyrica-possible side effects  Fatigue due to chronic pain and underlined   Acupuncture in the past and " helpful and stopped because insurance limited coverage  Patient is more of the natural type of person and does not respond to western medications.     Headache prevention options reviewed-  Amitriptyline or Topiramate, Cefaly device, Qulipta or would referr to [facility] with Jaida Roman PharmD    First step-Acupuncture referral and vitamin B2 200 mg daily for migraine headache prevention-per patient's choice.     Headache rescue treatment as needed -Ubrelvy  -see information provided     Return-send [employer] message or call after researching treatment options    Chronic pain syndrome-headaches, neck. History of spinal stenosis -talk to Dr Solano       Objective    /83 (BP Location: Right arm, Patient Position: Sitting, Cuff Size: Adult Regular)   Pulse 76   Wt 79.2 kg (174 lb 8 oz)   SpO2 98%   BMI 31.92 kg/m    Body mass index is 31.92 kg/m .  Physical Exam   GENERAL: healthy, alert and no distress  NECK: no adenopathy, no asymmetry, masses, or scars and thyroid normal to palpation  NEURO: Normal strength and tone, sensory exam grossly normal, mentation intact, speech normal, cranial nerves 2-12 intact, DTR's normal and symmetric  and Romberg normal  PSYCH: mentation appears normal, affect normal    I discussed all my recommendations with Janine Cornell who verbalizes understanding and comfortable with the plan.  All of patient's questions were answered from the best of my knowledge.  Patient is in agreement with the plan.       45 minutes spent on the date of the encounter doing video access, chart  review, exam, results review,  meds review, treatment plan, documentation and further activities as noted above    KAYLYNN Dejesus, CNP [facility]  Headache certified  [facility]

## 2022-09-21 ENCOUNTER — TELEPHONE (OUTPATIENT)
Dept: RHEUMATOLOGY | Facility: CLINIC | Age: 57
End: 2022-09-21

## 2022-09-21 ENCOUNTER — OFFICE VISIT (OUTPATIENT)
Dept: FAMILY MEDICINE | Facility: CLINIC | Age: 57
End: 2022-09-21
Payer: COMMERCIAL

## 2022-09-21 VITALS
DIASTOLIC BLOOD PRESSURE: 81 MMHG | SYSTOLIC BLOOD PRESSURE: 124 MMHG | HEIGHT: 62 IN | OXYGEN SATURATION: 98 % | BODY MASS INDEX: 32.33 KG/M2 | WEIGHT: 175.7 LBS | HEART RATE: 69 BPM | TEMPERATURE: 98.2 F

## 2022-09-21 DIAGNOSIS — M54.50 LOW BACK PAIN OF OVER 3 MONTHS DURATION: ICD-10-CM

## 2022-09-21 DIAGNOSIS — M25.50 MULTIPLE JOINT PAIN: ICD-10-CM

## 2022-09-21 DIAGNOSIS — E11.9 TYPE 2 DIABETES, HBA1C GOAL < 8% (H): ICD-10-CM

## 2022-09-21 DIAGNOSIS — R51.9 NONINTRACTABLE HEADACHE, UNSPECIFIED CHRONICITY PATTERN, UNSPECIFIED HEADACHE TYPE: Primary | ICD-10-CM

## 2022-09-21 DIAGNOSIS — M54.2 NECK PAIN: ICD-10-CM

## 2022-09-21 DIAGNOSIS — R11.0 NAUSEA: ICD-10-CM

## 2022-09-21 DIAGNOSIS — M79.10 MYALGIA: ICD-10-CM

## 2022-09-21 DIAGNOSIS — R21 RASH: ICD-10-CM

## 2022-09-21 PROCEDURE — 99215 OFFICE O/P EST HI 40 MIN: CPT | Performed by: FAMILY MEDICINE

## 2022-09-21 NOTE — PROGRESS NOTES
"  Assessment & Plan     Nonintractable headache, unspecified chronicity pattern, unspecified headache type  Pool PT  - Physical Therapy Referral; Future  I'm investigating acupuncture options     Low back pain of over 3 months duration  Same  - Physical Therapy Referral; Future    Neck pain  Same  - Physical Therapy Referral; Future    Multiple joint pain  Same  - Physical Therapy Referral; Future    Myalgia  Same  - Physical Therapy Referral; Future    Rash  F/u if recurs    Type 2 diabetes, HbA1C goal < 8% (H)  I'm communicating w/ MTM on non jardiance options    Nausea: cont current meds, see me one month      We did review [facility] integtrative notes in visits  51 minutes spent on the date of the encounter doing chart review, history and exam, documentation and further activities per the note    BMI:   Estimated body mass index is 32.14 kg/m  as calculated from the following:    Height as of this encounter: 1.575 m (5' 2\").    Weight as of this encounter: 79.7 kg (175 lb 11.2 oz).     Return in about 1 month (around 10/21/2022).    Kash Solano MD  [facility] [place]    Adrienne Mehta is a 57 year old, presenting for the following health issues:  Follow Up      HPI Here in f/u  Reviewed interval notes:  Neuro-eye  Neuro  Pt considerning new HA regimen but has not started  Unable to find accessible pool PT location, we discuss [facility], she can likely get there, given referral/info for her multiple areas of pain I think this would be a good trial  Likewise she is open to acupuncture (suggested by Neuro also), unsure where to go, I'll check on that and get back to her  GI: nausea comes goes, appetite varies but can be very good at times, no new GI sx since last visit here, compazine and regular zofran help/tolerated w nausea, dissolve zofran actually a HA trigger she identified.     She has found that she is very sensitive to sounds and smells    She used to see " integrative clinic at [facility] and they had her on oral nystatin, she thinks she might've done better overall on that    Since on Jardiance since early Auugst, creatinine up had UTI had yeast infection, she suspects Jardiance and will stop it, I'm communicating with MTM on options for DM control    Has had some transient red skin lesions, self limited, unclear cause, with underlying autoimmune disorder possibly linked, discussed, none now    Past Medical History:   Diagnosis Date     Abnormal glandular Papanicolaou smear of cervix 1992     Allergic rhinitis     Allergy, airborne subst     Arthritis      ASCVD (arteriosclerotic cardiovascular disease)      Chronic pain      Chronic pancreatitis (H)     idiopathic, Tx: PPI, antioxidants, pancreatic enzymes     Common migraine      Coronary artery disease      Costochondritis      Difficulty in walking(719.7)      Dyspnea on exertion      Ectasia, mammary duct     [facility], persistent nipple discharge     Elevated fasting glucose      Gastro-oesophageal reflux disease      Granulomatosis with polyangiitis (H)      Hernia      History of angina      Hyperlipidemia LDL goal < 100      Hypertension goal BP (blood pressure) < 140/90     Essential hypertension     Iron deficiency anemia      Ischemic cardiomyopathy      Menorrhagia      Migraine headaches      Mild persistent asthma      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 Matias, neg w/u, Rx'd w/prednisone     Ventral hernia, unspecified, without mention of obstruction or gangrene 2012     Past Surgical History:   Procedure Laterality Date     C/SECTION, LOW TRANSVERSE           CARDIAC SURGERY      cardiac stent- recent in 16; 4 other stents     DILATION AND CURETTAGE N/A 2016    Procedure: DILATION AND CURETTAGE;  Surgeon: Nahed Butler MD;  Location: [facility]     ESOPHAGOSCOPY, GASTROSCOPY, DUODENOSCOPY (EGD), COMBINED N/A 3/31/2022    Procedure: ESOPHAGOGASTRODUODENOSCOPY, WITH BIOPSY;  Surgeon: Enzo Sesay MD;  Location:  GI      UGI ENDOSCOPY W EUS  08    Dr. Pastrana, possible chronic pancreatitis, fatty liver     HERNIA REPAIR  2012    done at [facility]     INSERT INTRAUTERINE DEVICE N/A 2016    Procedure: INSERT INTRAUTERINE DEVICE;  Surgeon: Nahed Butler MD;  Location: [facility] OR     INT UTERINE FIBRIOD EMBOLIZATION  10/29/2014     LAPAROSCOPIC CHOLECYSTECTOMY  08    Dr. Arnol GRUBBS TX, CERVICAL      s/p LEEP     ORBITOTOMY Right 3/15/2016    Procedure: ORBITOTOMY;  Surgeon: Myron Cyr MD;  Location: [facility]     ORBITOTOMY Right 2017    Procedure: ORBITOTOMY;  RIGHT ORBITOTOMY AND BIOPSY;  Surgeon: Charis Holbrook MD;  Location: [facility]     REPAIR PTOSIS Right 2017    Procedure: REPAIR PTOSIS;  RIGHT UPPER LID PTOSIS REPAIR;  Surgeon: Myron Cyr MD;  Location: [facility]     UPPER GI ENDOSCOPY  10/21/08    mild gastritis, Dr. Rocky [facility] ECHO HEART XTHORACIC,COMPLETE, W/O DOPPLER  04    [facility] WNL, EF 60%     Current Outpatient Medications   Medication     acetaminophen (TYLENOL) 325 MG tablet     ADVAIR DISKUS 250-50 MCG/ACT inhaler     albuterol (2.5 MG/3ML) 0.083% neb solution     albuterol (PROAIR HFA, PROVENTIL HFA, VENTOLIN HFA) 108 (90 BASE)  MCG/ACT inhaler     azelastine (ASTELIN) 0.1 % nasal spray     blood glucose (NO BRAND SPECIFIED) lancets standard     blood glucose (NO BRAND SPECIFIED) test strip     Blood Pressure Monitor KIT     calcium carbonate (TUMS) 500 MG chewable tablet     clopidogrel (PLAVIX) 75 MG tablet     COMPRESSION STOCKINGS     cyclobenzaprine (FLEXERIL) 10 MG tablet     dicyclomine (BENTYL) 20 MG tablet     diphenhydrAMINE (BENADRYL) 25 MG capsule     empagliflozin (JARDIANCE) 10 MG TABS tablet     EPIPEN 2-RIKY 0.3 MG/0.3ML injection     Ergocalciferol (VITAMIN D2) 50 MCG (2000 UT) TABS     esomeprazole (NEXIUM) 40 MG DR capsule     famotidine (PEPCID) 20 MG tablet     fexofenadine (ALLEGRA) 180 MG tablet     fluticasone (FLONASE) 50 MCG/ACT nasal spray     folic acid (FOLVITE) 1 MG tablet     insulin glargine (LANTUS PEN) 100 UNIT/ML pen     insulin pen needle (BD MARCK U/F) 32G X 4 MM miscellaneous     levocetirizine (XYZAL) 5 MG tablet     lisinopril-hydrochlorothiazide (ZESTORETIC) 20-25 MG tablet     magnesium 250 MG tablet     metFORMIN (GLUCOPHAGE-XR) 500 MG 24 hr tablet     metoprolol succinate ER (TOPROL XL) 100 MG 24 hr tablet     Multiple Vitamin (DAILY-GERALD MULTIVITAMIN) TABS     nitroGLYcerin (NITROSTAT) 0.4 MG sublingual tablet     olopatadine (PATANOL) 0.1 % ophthalmic solution     pravastatin (PRAVACHOL) 40 MG tablet     prochlorperazine (COMPAZINE) 5 MG tablet     sennosides (SENOKOT) 8.6 MG tablet     spironolactone (ALDACTONE) 25 MG tablet     sucralfate (CARAFATE) 1 GM tablet     terbinafine (LAMISIL) 1 % external cream     terconazole (TERAZOL 3) 80 MG vaginal suppository     traMADol (ULTRAM) 50 MG tablet     vitamin B-2 (RIBOFLAVIN) 100 MG TABS tablet     vitamin D2 (ERGOCALCIFEROL) 56212 units (1250 mcg) capsule     ketoconazole (NIZORAL) 2 % shampoo     No current facility-administered medications for this visit.     Allergies   Allergen Reactions     Amoxicillin-Pot Clavulanate      Artificial Sweetner  "(Informational Only)  Other (See Comments)     Increased headache     Augmentin      Unknown possible hives and edema     Azithromycin      Diatrizoate Other (See Comments)     Pt wants this listed because she is allergic to shellfish      Imitrex [Sumatriptan]      Severe face/neck/chest tightness and flushing side effects      Penicillins Hives     Unknown      Pork Allergy      Stomach pain, cramping, diarrhea, itching, nausea and headaches     Shellfish Allergy Hives and Swelling     Sulfa Drugs Hives and Swelling     Zithromax [Azithromycin Dihydrate] Swelling     Unknown            Review of Systems         Objective    /81 (BP Location: Right arm, Patient Position: Sitting, Cuff Size: Adult Regular)   Pulse 69   Temp 98.2  F (36.8  C) (Oral)   Ht 1.575 m (5' 2\")   Wt 79.7 kg (175 lb 11.2 oz)   SpO2 98%   BMI 32.14 kg/m    Body mass index is 32.14 kg/m .  Physical Exam   GENERAL: healthy, alert and no distress  NECK: no adenopathy, no asymmetry, masses, or scars and thyroid normal to palpation  RESP: lungs clear to auscultation - no rales, rhonchi or wheezes  CV: regular rate and rhythm, normal S1 S2, no S3 or S4, no murmur, click or rub, no peripheral edema and peripheral pulses strong  ABDOMEN: soft, nontender, no hepatosplenomegaly, no masses and bowel sounds normal  MS: no gross musculoskeletal defects noted, no edema  SKIN: no suspicious lesions or rashes                        "

## 2022-09-21 NOTE — TELEPHONE ENCOUNTER
Called and spoke with pt, she started Jardiance 8/1, she said her blood work has changed since then.     She was a little upset that the providers are not talking to each other, but does want us to note that the Jardiance appears to be changing her labs.    Desiree Godinez RN  Rheumatology Clinic

## 2022-09-21 NOTE — TELEPHONE ENCOUNTER
----- Message from Majo Mckinnon MD sent at 9/9/2022  3:33 PM CDT -----  Kidney function is still slightly down. Could I refer you to nephrology?

## 2022-09-21 NOTE — NURSING NOTE
Janine Cornell is a 57 year old female that presents in clinic today for the following:     Chief Complaint   Patient presents with     Follow Up       The patient's allergies and medications were reviewed. The patient's vitals were obtained without incident. The patient does not have any other questions for the provider.     Kash Brothers, EMT at 1:12 PM on 9/21/2022.  Primary Care Clinic: [phone]

## 2022-09-27 ENCOUNTER — TELEPHONE (OUTPATIENT)
Dept: INTERNAL MEDICINE | Facility: CLINIC | Age: 57
End: 2022-09-27

## 2022-09-27 NOTE — TELEPHONE ENCOUNTER
Left a detailed message to the pt regarding this matter and call me back for there thought.    Soon-Mi  ----------------------------------------------------------------------------        ----- Message from Kash Solano MD sent at 9/26/2022  1:22 PM CDT -----  Regarding: RE: acup  Thx!  Soon joanie Mehta knows we are looking into this  Can you let her know our acupuncture option is at a pain clinic in [place], if she can make it there place referral re: headache, with acupunture mentioned on referral  Jeri Vu  ----- Message -----  From: Paige Tiwari APRN CNP  Sent: 9/26/2022  12:40 PM CDT  To: Kash Solano MD  Subject: RE: acup                                         Happy Monday!    The pain group has someone out in [place]. You can refer by placing a pain management referral order, then acupuncture should be an option on there. Patient should be contacted to schedule (that's what I am told!).     Hope all is well!    Paige   ----- Message -----  From: Kash Solano MD  Sent: 9/21/2022   5:02 PM CDT  To: KAYLYNN Sarah CNP  Subject: acup                                             Hey general question  Janine has gobs of issues, many autoimmune, sees Rheum, Neuro suggested acupuncuture for HA, she's not sure where to go, do we have anyone in [facility]? I think I asked before but forget.  Jeri Vu

## 2022-10-06 ENCOUNTER — TELEPHONE (OUTPATIENT)
Dept: FAMILY MEDICINE | Facility: CLINIC | Age: 57
End: 2022-10-06

## 2022-10-06 NOTE — TELEPHONE ENCOUNTER
[facility] Call Center    Phone Message    May a detailed message be left on voicemail: yes     Reason for Call: Other: Per call from Calixto Nunez, requesting Physical Therapy order faxed to [phone], patient scheduled 10/20.      Action Taken: Message routed to:  [facility]    Travel Screening: Not Applicable

## 2022-10-06 NOTE — TELEPHONE ENCOUNTER
PT referral faxed to number below.      Chaitanya Cantrell CMA ([facility]) at 2:07 PM on 10/6/2022

## 2022-10-21 ENCOUNTER — VIRTUAL VISIT (OUTPATIENT)
Dept: FAMILY MEDICINE | Facility: CLINIC | Age: 57
End: 2022-10-21
Payer: COMMERCIAL

## 2022-10-21 DIAGNOSIS — M54.50 LOW BACK PAIN OF OVER 3 MONTHS DURATION: ICD-10-CM

## 2022-10-21 DIAGNOSIS — M25.50 MULTIPLE JOINT PAIN: ICD-10-CM

## 2022-10-21 DIAGNOSIS — E11.9 TYPE 2 DIABETES, HBA1C GOAL < 8% (H): Primary | ICD-10-CM

## 2022-10-21 PROCEDURE — 99443 PR PHYSICIAN TELEPHONE EVALUATION 21-30 MIN: CPT | Mod: 95 | Performed by: FAMILY MEDICINE

## 2022-10-21 NOTE — PROGRESS NOTES
"   06/29/20 1600   Group Therapy Session   Group Attendance attended group session   Total Time (minutes) 50   Group Type psychotherapeutic   Patient Participation/Contribution cooperative with task;discussed personal experience with topic;listened actively;offered helpful suggestions to peers   Psychotherapy goal: Self-reflection through the use of the \"[facility]\" activity.    Ivelisse reports having \"mixed feelings...good news but confusing.\" She presents in a pleasant mood. Affect congruent. She was actively engaged in the group activity and thoughtfully processed her responses.  When writer inquired about thoughts or insights into the activity and their responses, Ivelisse actively shared her thoughts with the group. Reports the activity gave her some clarity and helped her to name what is important to her.  " "  Janine is a 57 year old who is being evaluated via a billable telephone visit.      What phone number would you like to be contacted at? [phone]  How would you like to obtain your AVS? Mail   Phone call duration: 22 minutes  Janine is a 57 year old who is being evaluated via a billable telephone visit.        Assessment & Plan     Type 2 diabetes, HbA1C goal < 8% (H)  I've communicated w/ pharmD we will reach out, see me in a mo    Low back pain of over 3 months duration  Do the pool PT    Multiple joint pain  Same        25 minutes spent on the date of the encounter doing chart review, history and exam, documentation and further activities per the note    BMI:   Estimated body mass index is 32.14 kg/m  as calculated from the following:    Height as of 9/21/22: 1.575 m (5' 2\").    Weight as of 9/21/22: 79.7 kg (175 lb 11.2 oz).     Return in about 1 month (around 11/21/2022).    Kash Solano MD  [facility]   Janine is a 57 year old, presenting for the following health issues:  Telephone (Follow up appt)      HPI   1-did not yet do pool PT, rvwd concept, she has info and referral, see my last note, rationale rvwd, she agrees to call and do  2-still on jardiance (we'd discussed stopping last visit, also, refills would've run out about now), on insulin and metformin, does home bg states vary a great deal, due for PharmD recheck, discussed. Has had a few more yeast infxn no recent UTI, she is worried about creat rising  Past Medical History:   Diagnosis Date     Abnormal glandular Papanicolaou smear of cervix 1992     Allergic rhinitis     Allergy, airborne subst     Arthritis      ASCVD (arteriosclerotic cardiovascular disease)      Chronic pain      Chronic pancreatitis (H)     idiopathic, Tx: PPI, antioxidants, pancreatic enzymes     Common migraine      Coronary artery disease      Costochondritis      Difficulty in walking(719.7)      Dyspnea on exertion  "     Ectasia, mammary duct     [facility], persistent n[facility] discharge     Elevated fasting glucose      Gastro-oesophageal reflux disease      Granulomatosis with polyangiitis (H)      Hernia      History of angina      Hyperlipidemia LDL goal < 100      Hypertension goal BP (blood pressure) < 140/90     Essential hypertension     Iron deficiency anemia      Ischemic cardiomyopathy      Menorrhagia      Migraine headaches      Mild persistent asthma      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 Matias, neg w/u, Rx'd w/prednisone     Ventral hernia, unspecified, without mention of obstruction or gangrene 2012     Past Surgical History:   Procedure Laterality Date     C/SECTION, LOW TRANSVERSE           CARDIAC SURGERY      cardiac stent- recent in 16; 4 other stents     DILATION AND CURETTAGE N/A 2016    Procedure: DILATION AND CURETTAGE;  Surgeon: Nahed Butler MD;  [facility]     ESOPHAGOSCOPY, GASTROSCOPY, DUODENOSCOPY (EGD), COMBINED N/A 3/31/2022    Procedure: ESOPHAGOGASTRODUODENOSCOPY, WITH BIOPSY;  Surgeon: Enzo Sesay MD;  Location:  GI     HC UGI ENDOSCOPY W EUS  08    Dr. Pastrana, possible chronic pancreatitis, fatty liver     [place] [facility] [place] INTRAUTERINE  DEVICE N/A 7/6/2016    Procedure: INSERT INTRAUTERINE DEVICE;  Surgeon: Nahed Butler MD;  [facility]     INT UTERINE FIBRIOD EMBOLIZATION  10/29/2014     LAPAROSCOPIC CHOLECYSTECTOMY  5/28/08    Dr. Arnol GRUBBS TX, CERVICAL      s/p LEEP     ORBITOTOMY Right 3/15/2016    Procedure: ORBITOTOMY;  Surgeon: Myron Cyr MD;  [facility]     ORBITOTOMY Right 8/4/2017    Procedure: ORBITOTOMY;  RIGHT ORBITOTOMY AND BIOPSY;  Surgeon: Charis Holbrook MD;  [facility]     REPAIR PTOSIS Right 11/17/2017    Procedure: REPAIR PTOSIS;  RIGHT UPPER LID PTOSIS REPAIR;  Surgeon: Myron Cyr MD;  Location: [facility]     UPPER GI ENDOSCOPY  10/21/08    mild gastritis, Dr. Rocky CALDERA ECHO HEART XTHORACIC,COMPLETE, W/O DOPPLER  2/4/04    [facility] WNL, EF 60%     Current Outpatient Medications   Medication     acetaminophen (TYLENOL) 325 MG tablet     ADVAIR DISKUS 250-50 MCG/ACT inhaler     albuterol (2.5 MG/3ML) 0.083% neb solution     albuterol (PROAIR HFA, PROVENTIL HFA, VENTOLIN HFA) 108 (90 BASE) MCG/ACT inhaler     azelastine (ASTELIN) 0.1 % nasal spray     blood glucose (NO BRAND SPECIFIED) lancets standard     blood glucose (NO BRAND SPECIFIED) test strip     Blood Pressure Monitor KIT     calcium carbonate (TUMS) 500 MG chewable tablet     clopidogrel (PLAVIX) 75 MG tablet     COMPRESSION STOCKINGS     cyclobenzaprine (FLEXERIL) 10 MG tablet     dicyclomine (BENTYL) 20 MG tablet     diphenhydrAMINE (BENADRYL) 25 MG capsule     empagliflozin (JARDIANCE) 10 MG TABS tablet     EPIPEN 2-RIKY 0.3 MG/0.3ML injection     Ergocalciferol (VITAMIN D2) 50 MCG (2000 UT) TABS     esomeprazole (NEXIUM) 40 MG DR capsule     famotidine (PEPCID) 20 MG tablet     fexofenadine (ALLEGRA) 180 MG tablet     fluticasone (FLONASE) 50 MCG/ACT nasal spray     folic acid (FOLVITE) 1 MG tablet     insulin glargine (LANTUS PEN) 100 UNIT/ML pen     insulin pen needle (BD MARCK U/F) 32G X 4 MM  "miscellaneous     ketoconazole (NIZORAL) 2 % shampoo     levocetirizine (XYZAL) 5 MG tablet     lisinopril-hydrochlorothiazide (ZESTORETIC) 20-25 MG tablet     magnesium 250 MG tablet     metFORMIN (GLUCOPHAGE-XR) 500 MG 24 hr tablet     metoprolol succinate ER (TOPROL XL) 100 MG 24 hr tablet     Multiple Vitamin (DAILY-GERALD MULTIVITAMIN) TABS     nitroGLYcerin (NITROSTAT) 0.4 MG sublingual tablet     olopatadine (PATANOL) 0.1 % ophthalmic solution     pravastatin (PRAVACHOL) 40 MG tablet     prochlorperazine (COMPAZINE) 5 MG tablet     sennosides (SENOKOT) 8.6 MG tablet     spironolactone (ALDACTONE) 25 MG tablet     sucralfate (CARAFATE) 1 GM tablet     terbinafine (LAMISIL) 1 % external cream     terconazole (TERAZOL 3) 80 MG vaginal suppository     traMADol (ULTRAM) 50 MG tablet     vitamin B-2 (RIBOFLAVIN) 100 MG TABS tablet     vitamin D2 (ERGOCALCIFEROL) 69727 units (1250 mcg) capsule     No current facility-administered medications for this visit.     Allergies   Allergen Reactions     Amoxicillin-Pot Clavulanate      Artificial Sweetner (Informational Only)  Other (See Comments)     Increased headache     Augmentin      Unknown possible hives and edema     Azithromycin      Diatrizoate Other (See Comments)     Pt wants this listed because she is allergic to shellfish      Imitrex [Sumatriptan]      Severe face/neck/chest tightness and flushing side effects      Penicillins Hives     Unknown      Pork Allergy      Stomach pain, cramping, diarrhea, itching, nausea and headaches     Shellfish Allergy Hives and Swelling     Sulfa Drugs Hives and Swelling     Zithromax [Azithromycin Dihydrate] Swelling     Unknown        Review of Systems         Objective    Vitals - Patient Reported  Weight (Patient Reported): 78 kg (172 lb)  Height (Patient Reported): 157.5 cm (5' 2\")  BMI (Based on Pt Reported Ht/Wt): 31.46  Pain Score: Mild Pain (3)  Pain Loc: Abdomen        Physical Exam   healthy, alert and no " distress  PSYCH: Alert and oriented times 3; coherent speech, normal   rate and volume, able to articulate logical thoughts, able   to abstract reason, no tangential thoughts, no hallucinations   or delusions  Her affect is normal  RESP: No cough, no audible wheezing, able to talk in full sentences  Remainder of exam unable to be completed due to telephone visits      Phone call duration: 22 minutes

## 2022-10-29 DIAGNOSIS — N76.0 VAGINITIS AND VULVOVAGINITIS: ICD-10-CM

## 2022-10-30 DIAGNOSIS — E55.9 VITAMIN D DEFICIENCY: ICD-10-CM

## 2022-11-03 RX ORDER — METRONIDAZOLE 500 MG/1
500 TABLET ORAL 2 TIMES DAILY
Qty: 14 TABLET | Refills: 0 | OUTPATIENT
Start: 2022-11-03

## 2022-11-03 NOTE — TELEPHONE ENCOUNTER
METRONIDAZOLE 500MG TABLETS      Last Written Prescription Date:  3/3/22  Last Fill Quantity: 14,   # refills: 0  Last Office Visit : 10/21/22  Future Office visit:  none    Routing refill request to provider for review/approval because:  Drug not active on patient's medication list  Thank-you, Nahed CAMPBELL RN Medication Refill Team

## 2022-11-03 NOTE — TELEPHONE ENCOUNTER
VITAMIN D2 50,000IU (ERGO) CAP RX    Last Written Prescription Date:  8/19/22  Last Fill Quantity: 4,   # refills: 0  Last Office Visit : 8/19/22  Future Office visit:  2/16/23    Routing refill request to provider for review/approval because:  Drug not on the Rheumatology refill protocol   Thank-you, Nahed CAMPBELL RN Medication Refill Team

## 2022-11-04 RX ORDER — ERGOCALCIFEROL 1.25 MG/1
50000 CAPSULE, LIQUID FILLED ORAL WEEKLY
Qty: 12 CAPSULE | Refills: 0 | Status: SHIPPED | OUTPATIENT
Start: 2022-11-04 | End: 2023-02-27

## 2022-11-22 ENCOUNTER — TELEPHONE (OUTPATIENT)
Dept: FAMILY MEDICINE | Facility: CLINIC | Age: 57
End: 2022-11-22

## 2022-11-22 NOTE — TELEPHONE ENCOUNTER
LVM for patient to schedule LAB orders placed by Dr. Solano for 11/23.  Patient was provided with contact information to call for scheduling.

## 2022-11-22 NOTE — TELEPHONE ENCOUNTER
[facility] Call Center    Phone Message    May a detailed message be left on voicemail: yes     Reason for Call: Other: Pt requesting orders for pool therapy be faxed to [phone]

## 2022-11-22 NOTE — TELEPHONE ENCOUNTER
PT pool therapy referral faxed via rightfax.      Chaitanya Cantrell CMA ([facility]) at 10:32 AM on 11/22/2022

## 2022-11-23 ENCOUNTER — OFFICE VISIT (OUTPATIENT)
Dept: FAMILY MEDICINE | Facility: CLINIC | Age: 57
End: 2022-11-23
Payer: COMMERCIAL

## 2022-11-23 ENCOUNTER — LAB (OUTPATIENT)
Dept: LAB | Facility: CLINIC | Age: 57
End: 2022-11-23
Attending: FAMILY MEDICINE
Payer: COMMERCIAL

## 2022-11-23 ENCOUNTER — LAB (OUTPATIENT)
Dept: LAB | Facility: CLINIC | Age: 57
End: 2022-11-23
Payer: COMMERCIAL

## 2022-11-23 VITALS
SYSTOLIC BLOOD PRESSURE: 114 MMHG | WEIGHT: 172.1 LBS | OXYGEN SATURATION: 98 % | HEART RATE: 79 BPM | BODY MASS INDEX: 31.67 KG/M2 | HEIGHT: 62 IN | DIASTOLIC BLOOD PRESSURE: 74 MMHG

## 2022-11-23 DIAGNOSIS — R53.83 FATIGUE, UNSPECIFIED TYPE: ICD-10-CM

## 2022-11-23 DIAGNOSIS — E11.9 TYPE 2 DIABETES, HBA1C GOAL < 8% (H): Primary | ICD-10-CM

## 2022-11-23 DIAGNOSIS — M79.10 MYALGIA: ICD-10-CM

## 2022-11-23 DIAGNOSIS — E11.9 TYPE 2 DIABETES, HBA1C GOAL < 8% (H): ICD-10-CM

## 2022-11-23 LAB
ALBUMIN SERPL BCG-MCNC: 4.8 G/DL (ref 3.5–5.2)
ALP SERPL-CCNC: 93 U/L (ref 35–104)
ALT SERPL W P-5'-P-CCNC: 32 U/L (ref 10–35)
ANION GAP SERPL CALCULATED.3IONS-SCNC: 13 MMOL/L (ref 7–15)
AST SERPL W P-5'-P-CCNC: 23 U/L (ref 10–35)
BASOPHILS # BLD AUTO: 0.1 10E3/UL (ref 0–0.2)
BASOPHILS NFR BLD AUTO: 0 %
BILIRUB SERPL-MCNC: 0.3 MG/DL
BUN SERPL-MCNC: 21.9 MG/DL (ref 6–20)
CALCIUM SERPL-MCNC: 10.1 MG/DL (ref 8.6–10)
CHLORIDE SERPL-SCNC: 99 MMOL/L (ref 98–107)
CREAT SERPL-MCNC: 1.18 MG/DL (ref 0.51–0.95)
CRP SERPL-MCNC: 5.88 MG/L
DEPRECATED HCO3 PLAS-SCNC: 26 MMOL/L (ref 22–29)
EOSINOPHIL # BLD AUTO: 0.3 10E3/UL (ref 0–0.7)
EOSINOPHIL NFR BLD AUTO: 3 %
ERYTHROCYTE [DISTWIDTH] IN BLOOD BY AUTOMATED COUNT: 14.5 % (ref 10–15)
ERYTHROCYTE [SEDIMENTATION RATE] IN BLOOD BY WESTERGREN METHOD: 9 MM/HR (ref 0–30)
GFR SERPL CREATININE-BSD FRML MDRD: 54 ML/MIN/1.73M2
GLUCOSE SERPL-MCNC: 232 MG/DL (ref 70–99)
HBA1C MFR BLD: 8.5 %
HCT VFR BLD AUTO: 43.3 % (ref 35–47)
HGB BLD-MCNC: 13.8 G/DL (ref 11.7–15.7)
IMM GRANULOCYTES # BLD: 0 10E3/UL
IMM GRANULOCYTES NFR BLD: 0 %
LYMPHOCYTES # BLD AUTO: 2.3 10E3/UL (ref 0.8–5.3)
LYMPHOCYTES NFR BLD AUTO: 19 %
MCH RBC QN AUTO: 25 PG (ref 26.5–33)
MCHC RBC AUTO-ENTMCNC: 31.9 G/DL (ref 31.5–36.5)
MCV RBC AUTO: 79 FL (ref 78–100)
MONOCYTES # BLD AUTO: 0.8 10E3/UL (ref 0–1.3)
MONOCYTES NFR BLD AUTO: 7 %
NEUTROPHILS # BLD AUTO: 8.7 10E3/UL (ref 1.6–8.3)
NEUTROPHILS NFR BLD AUTO: 71 %
NRBC # BLD AUTO: 0 10E3/UL
NRBC BLD AUTO-RTO: 0 /100
PLATELET # BLD AUTO: 359 10E3/UL (ref 150–450)
POTASSIUM SERPL-SCNC: 4.7 MMOL/L (ref 3.4–5.3)
PROT SERPL-MCNC: 7.5 G/DL (ref 6.4–8.3)
PTH-INTACT SERPL-MCNC: 85 PG/ML (ref 15–65)
RBC # BLD AUTO: 5.51 10E6/UL (ref 3.8–5.2)
SODIUM SERPL-SCNC: 138 MMOL/L (ref 136–145)
WBC # BLD AUTO: 12.2 10E3/UL (ref 4–11)

## 2022-11-23 PROCEDURE — 85652 RBC SED RATE AUTOMATED: CPT | Performed by: PATHOLOGY

## 2022-11-23 PROCEDURE — 80053 COMPREHEN METABOLIC PANEL: CPT | Performed by: PATHOLOGY

## 2022-11-23 PROCEDURE — 90686 IIV4 VACC NO PRSV 0.5 ML IM: CPT | Performed by: FAMILY MEDICINE

## 2022-11-23 PROCEDURE — 85025 COMPLETE CBC W/AUTO DIFF WBC: CPT | Performed by: PATHOLOGY

## 2022-11-23 PROCEDURE — 90471 IMMUNIZATION ADMIN: CPT | Performed by: FAMILY MEDICINE

## 2022-11-23 PROCEDURE — 86140 C-REACTIVE PROTEIN: CPT | Performed by: PATHOLOGY

## 2022-11-23 PROCEDURE — 83970 ASSAY OF PARATHORMONE: CPT | Performed by: PATHOLOGY

## 2022-11-23 PROCEDURE — 99214 OFFICE O/P EST MOD 30 MIN: CPT | Mod: 25 | Performed by: FAMILY MEDICINE

## 2022-11-23 PROCEDURE — 36415 COLL VENOUS BLD VENIPUNCTURE: CPT | Performed by: PATHOLOGY

## 2022-11-23 PROCEDURE — 83036 HEMOGLOBIN GLYCOSYLATED A1C: CPT | Performed by: FAMILY MEDICINE

## 2022-11-23 RX ORDER — DIPHENHYDRAMINE HCL 25 MG/1
25 TABLET ORAL AT BEDTIME
COMMUNITY
Start: 2022-10-29

## 2022-11-23 ASSESSMENT — ASTHMA QUESTIONNAIRES: ACT_TOTALSCORE: 11

## 2022-11-23 NOTE — PROGRESS NOTES
"    Assessment & Plan     Type 2 diabetes, HbA1C goal < 8% (H)  Stop Jardiance. Start Januvia. Cont metformin, Lantus as is. See Endo.   - Adult Endocrinology  Referral; Future  - sitagliptin (JANUVIA) 50 MG tablet; Take 1 tablet (50 mg) by mouth daily  - FLU VAC PRESRV FREE QUAD SPLIT VIR 3+YRS IM  Lengthy review all types of meds for DM  Fatigue, unspecified type  Labs. TSH wnl May, consider redo.     Could consider eval for mood disturance, BRENNA also    Myalgia  Labs.   - Erythrocyte sedimentation rate; Future  - CRP inflammation; Future  - Parathyroid Hormone Intact; Future  - Comprehensive metabolic panel; Future  - CBC with platelets differential; Future      34 minutes spent on the date of the encounter doing chart review, history and exam, documentation and further activities per the note    BMI:   Estimated body mass index is 31.47 kg/m  as calculated from the following:    Height as of this encounter: 1.575 m (5' 2.01\").    Weight as of this encounter: 78.1 kg (172 lb 1.6 oz).     Return in about 1 month (around 12/23/2022).    Kash Solano MD  [facility] [place]    Adrienne Mehta is a 57 year old, presenting for the following health issues:  Follow Up (Pt here for follow up.)      HPI   Here in f/u  DM main focus today  Since on Jardiance (got rx in June she thinks she started in in August) has had infections, , we will stop it  Rvwd DM options at length, meds of all classes  Will start medium dose Januvia, cont metformin/Lantus  Rvwd can up titrate insulin, she is hesitant re: wt gain potential, and low bg potential  On ace, pravachol  Also rvwd managing it w/ me vs me/mtm vs endo, she would prefer endo, called herself, cannot get appt till May (six months), was seen there for other issues in past but many years ago. I'll put in referral for her.   Pain: myalgias and what she calls bone pain body wide, gradully  Worse this fall. Has FM, RA, DDD. On " rituximab.  Due for shots, multiple, she'll do one today. Flu.  [facility] visit soon, pap    Past Medical History:   Diagnosis Date     Abnormal glandular Papanicolaou smear of cervix      Allergic rhinitis     Allergy, airborne subst     Arthritis      ASCVD (arteriosclerotic cardiovascular disease)      Chronic pain      Chronic pancreatitis (H)     idiopathic, Tx: PPI, antioxidants, pancreatic enzymes     Common migraine      Coronary artery disease      Costochondritis      Difficulty in walking(719.7)      Dyspnea on exertion      Ectasia, mammary duct     [facility], persistent n[facility] discharge     Elevated fasting glucose      Gastro-oesophageal reflux disease      Granulomatosis with polyangiitis (H)      Hernia      History of angina      Hyperlipidemia LDL goal < 100      Hypertension goal BP (blood pressure) < 140/90     Essential hypertension     Iron deficiency anemia      Ischemic cardiomyopathy      Menorrhagia      Migraine headaches      Mild persistent asthma      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 Matias, neg w/u, Rx'd w/prednisone     Ventral hernia, unspecified, without mention of obstruction or gangrene 2012     Past Surgical History:   Procedure Laterality Date     C/SECTION, LOW TRANSVERSE            CARDIAC SURGERY      cardiac stent- recent in 2/9/16; 4 other stents     DILATION AND CURETTAGE N/A 7/6/2016    Procedure: DILATION AND CURETTAGE;  Surgeon: Nahed Butler MD;  [facility]     ESOPHAGOSCOPY, GASTROSCOPY, DUODENOSCOPY (EGD), COMBINED N/A 3/31/2022    Procedure: ESOPHAGOGASTRODUODENOSCOPY, WITH BIOPSY;  Surgeon: Enzo Sesay MD;  [facility]     HC UGI ENDOSCOPY W EUS  11/7/08    Dr. Pastrana, possible chronic pancreatitis, fatty liver     HERNIA REPAIR  12/2012    [place] [facility] [place]     INSERT INTRAUTERINE DEVICE N/A 7/6/2016    Procedure: INSERT INTRAUTERINE DEVICE;  Surgeon: Nahed Butler MD;  [facility]     INT UTERINE FIBRIOD EMBOLIZATION  10/29/2014     LAPAROSCOPIC CHOLECYSTECTOMY  5/28/08    Dr. Arnol GRUBBS TX, CERVICAL      s/p LEEP     ORBITOTOMY Right 3/15/2016    Procedure: ORBITOTOMY;  Surgeon: Myron Cyr MD;  [facility]     ORBITOTOMY Right 8/4/2017    Procedure: ORBITOTOMY;  RIGHT ORBITOTOMY AND BIOPSY;  Surgeon: Charis Holbrook MD;  [facility]     REPAIR PTOSIS Right 11/17/2017    Procedure: REPAIR PTOSIS;  RIGHT UPPER LID PTOSIS REPAIR;  Surgeon: Myron Cyr MD;  Location: [facility]     UPPER GI ENDOSCOPY  10/21/08    mild gastritis, Dr. Hidalgo     ALCON ECHO HEART XTHORACIC,COMPLETE, W/O DOPPLER  2/4/04    [facility] WNL, EF 60%     Current Outpatient Medications   Medication     sitagliptin (JANUVIA) 50 MG tablet     acetaminophen (TYLENOL) 325 MG tablet     ADVAIR DISKUS 250-50 MCG/ACT inhaler     albuterol (2.5 MG/3ML) 0.083% neb solution     albuterol (PROAIR HFA, PROVENTIL HFA, VENTOLIN HFA) 108 (90 BASE) MCG/ACT inhaler     azelastine (ASTELIN) 0.1 % nasal spray     BANOPHEN 25 MG tablet     blood glucose (NO BRAND SPECIFIED) lancets standard     blood glucose (NO BRAND SPECIFIED) test strip     Blood Pressure Monitor KIT     calcium carbonate (TUMS) 500 MG chewable tablet     clopidogrel (PLAVIX) 75 MG tablet      COMPRESSION STOCKINGS     cyclobenzaprine (FLEXERIL) 10 MG tablet     dicyclomine (BENTYL) 20 MG tablet     diphenhydrAMINE (BENADRYL) 25 MG capsule     empagliflozin (JARDIANCE) 10 MG TABS tablet     EPIPEN 2-RIKY 0.3 MG/0.3ML injection     Ergocalciferol (VITAMIN D2) 50 MCG (2000 UT) TABS     esomeprazole (NEXIUM) 40 MG DR capsule     famotidine (PEPCID) 20 MG tablet     fexofenadine (ALLEGRA) 180 MG tablet     fluticasone (FLONASE) 50 MCG/ACT nasal spray     folic acid (FOLVITE) 1 MG tablet     insulin glargine (LANTUS PEN) 100 UNIT/ML pen     insulin pen needle (BD MARCK U/F) 32G X 4 MM miscellaneous     ketoconazole (NIZORAL) 2 % shampoo     levocetirizine (XYZAL) 5 MG tablet     lisinopril-hydrochlorothiazide (ZESTORETIC) 20-25 MG tablet     magnesium 250 MG tablet     metFORMIN (GLUCOPHAGE-XR) 500 MG 24 hr tablet     metoprolol succinate ER (TOPROL XL) 100 MG 24 hr tablet     Multiple Vitamin (DAILY-GERALD MULTIVITAMIN) TABS     nitroGLYcerin (NITROSTAT) 0.4 MG sublingual tablet     olopatadine (PATANOL) 0.1 % ophthalmic solution     pravastatin (PRAVACHOL) 40 MG tablet     prochlorperazine (COMPAZINE) 5 MG tablet     sennosides (SENOKOT) 8.6 MG tablet     spironolactone (ALDACTONE) 25 MG tablet     sucralfate (CARAFATE) 1 GM tablet     terbinafine (LAMISIL) 1 % external cream     terconazole (TERAZOL 3) 80 MG vaginal suppository     traMADol (ULTRAM) 50 MG tablet     vitamin B-2 (RIBOFLAVIN) 100 MG TABS tablet     vitamin D2 (ERGOCALCIFEROL) 89234 units (1250 mcg) capsule     No current facility-administered medications for this visit.     Allergies   Allergen Reactions     Amoxicillin-Pot Clavulanate      Artificial Sweetner (Informational Only)  Other (See Comments)     Increased headache     Augmentin      Unknown possible hives and edema     Azithromycin      Diatrizoate Other (See Comments)     Pt wants this listed because she is allergic to shellfish      Imitrex [Sumatriptan]      Severe face/neck/chest  "tightness and flushing side effects      Penicillins Hives     Unknown      Pork Allergy      Stomach pain, cramping, diarrhea, itching, nausea and headaches     Shellfish Allergy Hives and Swelling     Sulfa Drugs Hives and Swelling     Zithromax [Azithromycin Dihydrate] Swelling     Unknown        Review of Systems         Objective    /74 (BP Location: Right arm, Patient Position: Sitting, Cuff Size: Adult Large)   Pulse 79   Ht 1.575 m (5' 2.01\")   Wt 78.1 kg (172 lb 1.6 oz)   SpO2 98%   BMI 31.47 kg/m    Body mass index is 31.47 kg/m .  Physical Exam   GENERAL: healthy, alert and no distress  MS: no gross musculoskeletal defects noted, no edema                  "

## 2022-11-23 NOTE — NURSING NOTE
"Janine Cornell is a 57 year old female patient that presents today in clinic for the following:    Chief Complaint   Patient presents with     Follow Up     Pt here for follow up.     The patient's allergies and medications were reviewed as noted. A set of vitals were recorded as noted without incident: /74 (BP Location: Right arm, Patient Position: Sitting, Cuff Size: Adult Large)   Pulse 79   Ht 1.575 m (5' 2.01\")   Wt 78.1 kg (172 lb 1.6 oz)   SpO2 98%   BMI 31.47 kg/m  . The patient does not have any other questions for the provider.    Kasandra Fletcher, EMT at 1:37 PM on 11/23/2022  "

## 2022-11-28 ENCOUNTER — TELEPHONE (OUTPATIENT)
Dept: FAMILY MEDICINE | Facility: CLINIC | Age: 57
End: 2022-11-28

## 2022-11-28 NOTE — TELEPHONE ENCOUNTER
[facility] Call Center    Phone Message    May a detailed message be left on voicemail: yes     Reason for Call: Other: Patient calling requesting a return call to discuss her visit on 11/23/2022 and the medication Januvia. Please call thank you.      Action Taken: Message routed to:  [facility]    Travel Screening: Not Applicable

## 2022-12-01 NOTE — TELEPHONE ENCOUNTER
Spoke with pt. She does not want to take januvia as this med is possibly causing pancreatitis.     Also please put the result note on labs on 11/23/22. We will mail the result letter to home.    Her endocrinology appt is on 5/3/23, she is not happy that she needs to wait that long, I sent the message to endocrinology to see what I can do for the sooner appt.

## 2022-12-02 NOTE — TELEPHONE ENCOUNTER
Result letter was mailed to home address today and pt was informed. According to the endocrinology note she declined a virtual appt for sooner opening and I am trying to help her for the sooner appt AGAIN, but she is not appreciative.

## 2022-12-11 DIAGNOSIS — I10 HYPERTENSION GOAL BP (BLOOD PRESSURE) < 140/90: ICD-10-CM

## 2022-12-13 RX ORDER — SPIRONOLACTONE 25 MG/1
TABLET ORAL
Qty: 45 TABLET | Refills: 11 | OUTPATIENT
Start: 2022-12-13

## 2022-12-15 ENCOUNTER — OFFICE VISIT (OUTPATIENT)
Dept: OBGYN | Facility: CLINIC | Age: 57
End: 2022-12-15
Attending: OBSTETRICS & GYNECOLOGY
Payer: COMMERCIAL

## 2022-12-15 ENCOUNTER — TELEPHONE (OUTPATIENT)
Dept: OBGYN | Facility: CLINIC | Age: 57
End: 2022-12-15

## 2022-12-15 VITALS
WEIGHT: 164 LBS | HEIGHT: 62 IN | SYSTOLIC BLOOD PRESSURE: 108 MMHG | DIASTOLIC BLOOD PRESSURE: 76 MMHG | BODY MASS INDEX: 30.18 KG/M2 | HEART RATE: 85 BPM

## 2022-12-15 DIAGNOSIS — Z12.4 SCREENING FOR MALIGNANT NEOPLASM OF CERVIX: ICD-10-CM

## 2022-12-15 DIAGNOSIS — N95.2 ATROPHIC VAGINITIS: ICD-10-CM

## 2022-12-15 DIAGNOSIS — Z01.419 ENCOUNTER FOR GYNECOLOGICAL EXAMINATION WITHOUT ABNORMAL FINDING: Primary | ICD-10-CM

## 2022-12-15 DIAGNOSIS — Z65.9 OTHER SOCIAL STRESSOR: ICD-10-CM

## 2022-12-15 LAB
CLUE CELLS: NEGATIVE
TRICHOMONAS (WET PREP): NEGATIVE
WBC (WET PREP): POSITIVE
YEAST (WET PREP): NEGATIVE

## 2022-12-15 PROCEDURE — 99213 OFFICE O/P EST LOW 20 MIN: CPT | Performed by: OBSTETRICS & GYNECOLOGY

## 2022-12-15 PROCEDURE — G0145 SCR C/V CYTO,THINLAYER,RESCR: HCPCS | Performed by: OBSTETRICS & GYNECOLOGY

## 2022-12-15 PROCEDURE — 87624 HPV HI-RISK TYP POOLED RSLT: CPT | Performed by: OBSTETRICS & GYNECOLOGY

## 2022-12-15 PROCEDURE — 87210 SMEAR WET MOUNT SALINE/INK: CPT | Performed by: OBSTETRICS & GYNECOLOGY

## 2022-12-15 RX ORDER — FLUCONAZOLE 150 MG/1
TABLET ORAL
COMMUNITY
Start: 2022-11-29 | End: 2022-12-15

## 2022-12-15 RX ORDER — ESTRADIOL 10 UG/1
10 INSERT VAGINAL
Qty: 8 TABLET | Refills: 11 | Status: SHIPPED | OUTPATIENT
Start: 2022-12-15 | End: 2023-03-09

## 2022-12-15 ASSESSMENT — PATIENT HEALTH QUESTIONNAIRE - PHQ9
SUM OF ALL RESPONSES TO PHQ QUESTIONS 1-9: 9
5. POOR APPETITE OR OVEREATING: MORE THAN HALF THE DAYS

## 2022-12-15 ASSESSMENT — ANXIETY QUESTIONNAIRES
GAD7 TOTAL SCORE: 16
2. NOT BEING ABLE TO STOP OR CONTROL WORRYING: NEARLY EVERY DAY
1. FEELING NERVOUS, ANXIOUS, OR ON EDGE: NEARLY EVERY DAY
GAD7 TOTAL SCORE: 16
5. BEING SO RESTLESS THAT IT IS HARD TO SIT STILL: NOT AT ALL
3. WORRYING TOO MUCH ABOUT DIFFERENT THINGS: NEARLY EVERY DAY
6. BECOMING EASILY ANNOYED OR IRRITABLE: MORE THAN HALF THE DAYS
7. FEELING AFRAID AS IF SOMETHING AWFUL MIGHT HAPPEN: NEARLY EVERY DAY

## 2022-12-15 ASSESSMENT — PAIN SCALES - GENERAL: PAINLEVEL: NO PAIN (0)

## 2022-12-15 NOTE — LETTER
12/15/2022       RE: Janine Cornell  [address]  [facility] [address]     Dear Colleague,    Thank you for referring your patient, Janine Cornell, to the [facility] [place] [facility]. Please see a copy of my visit note below.    Chief Complaint   Patient presents with     Physical     Annual exam   Rand Guerrero LPN      Progress Note    SUBJECTIVE:  Janine Cornell is an 57 year old  , who requests a breast and pelvic exam.  She is struggling with issues surrounding her housing and this is creating significant stress, sadness and anxiety.  She used to live just a half block from where Chemo Ortiz was murdered. She had to move following that and feels she lost quite a bit in the move and is not comfortable in her current housing due to health concerns.  She feels her mood issues are predominately related to this issue and agrees to speak with social work.  She declines mental health referral or to initiate selective serotonin reuptake inhibitor.    Patient is followed by Dr. Solano for primary care.    Concerns today include: Since  has lots of issues with yeast infections, thrush, skin.  Has not had good control of diabetes and working Dr. Solano to improve control.  Can't get in to see endocrine until May, 2023.    Today having some vaginal discharge.  Took Diflucan a week ago.    Had D&C and IUD placement in .  Has rare spotting, no regular bleeding.  Having significant vasomotor symptoms.  Not sexually active, has not noted vaginal dryness.  Has urinary urgency with episodes of incontinence and enuresis.     Prefers to wait until next year to remove IUD (7 years after placement), concerned if removes and not menopausal she would be miserable.    Menstrual History:  Menstrual History 2017   LAST MENSTRUAL PERIOD 5/15/2015 2015 2016 2016 2017        Last    Lab Results   Component Value Date    PAP NIL 07/28/2016     History of abnormal Pap smear: NO - age 30-65 PAP every 5 years with negative HPV co-testing recommended    Last   Lab Results   Component Value Date    HPV16 Negative 07/28/2016     Last   Lab Results   Component Value Date    HPV18 Negative 07/28/2016     Last   Lab Results   Component Value Date    HRHPV Negative 07/28/2016       Mammogram current: has scheduled for 12/19/22    HISTORY:  Prescription Medications as of 12/15/2022       Rx Number Disp Refills Start End Last Dispensed Date Next Fill Date Owning Pharmacy    acetaminophen (TYLENOL) 325 MG tablet  250 tablet 4 1/21/2021    [facility] ST[facility] #[id number] [place] [id number] AT [address] [place] [address]    Sig: Take 1-2 tablets (325-650 mg) by mouth every 6 hours as needed for pain (headache)    Class: E-Prescribe    Route: Oral    ADVAIR DISKUS 250-50 MCG/ACT inhaler    8/8/2022        Class: Historical    albuterol (2.5 MG/3ML) 0.083% neb solution   1 10/21/2016        Sig: INL 1 VIAL VIA NEBULIZATION Q 4 TO 6 HOURS PRN    Class: Historical    albuterol (PROAIR HFA, PROVENTIL HFA, VENTOLIN HFA) 108 (90 BASE) MCG/ACT inhaler  3 Inhaler 1 10/6/2014    [facility] #[id number] [place], [place] [id number] [facility] [address] [place] [address]    Sig: Inhale 2 puffs into the lungs every 6 hours as needed for shortness of breath / dyspnea or wheezing    Class: E-Prescribe    Notes to Pharmacy: Profile Rx: patient will contact pharmacy when needed    Route: Inhalation    azelastine (ASTELIN) 0.1 % nasal spray    8/3/2022    [facility] #[id number] [place] [id number] [facility] [address] [place] [address]    Sig: USE 1 TO 2 SPRAYS IN EACH NOSTRIL TWICE DAILY AS NEEDED    Class: Historical    BANOPHEN 25 MG tablet    10/29/2022        Sig: Take 25 mg by mouth At Bedtime    Class: Historical    Route: Oral    calcium carbonate (TUMS) 500 MG chewable  tablet  90 tablet 3 5/10/2021    [id number] - [place], [place] - [address] AT [address]    Sig: Take 3-4 tablets (1,500-2,000 mg) by mouth daily as needed    Class: E-Prescribe    Route: Oral    cyclobenzaprine (FLEXERIL) 10 MG tablet  60 tablet 3 11/5/2021    [facility] #[id number] [place] [id number] [facility] AT [address] [place] [address]    Sig: Take 1 tablet (10 mg) by mouth 2 times daily as needed for muscle spasms    Class: E-Prescribe    Route: Oral    dicyclomine (BENTYL) 20 MG tablet  60 tablet 0 11/12/2019    [facility] #[id number] [place] [address] AT [address] [place] [address]    Sig: TAKE 1 TABLET(20 MG) BY MOUTH FOUR TIMES DAILY AS NEEDED. [facility] schedule clinic appt for refills.    Class: E-Prescribe    diphenhydrAMINE (BENADRYL) 25 MG capsule  30 capsule 2 2/2/2021    [place], [place] - [address] [place] [address] [phone]    Sig: Take 1 capsule (25 mg) by mouth nightly as needed for itching or allergies    Class: E-Prescribe    Route: Oral    Ergocalciferol (VITAMIN D2) 50 MCG (2000 UT) TABS  90 tablet 1 9/27/2021    [facility] #[id number] [facility] [address] [facility] AT [address] [place] [address]    Sig: Take 2,000 Units by mouth daily    Class: E-Prescribe    Route: Oral    esomeprazole (NEXIUM) 40 MG DR capsule  180 capsule 0 5/14/2022    [facility] #[id number] [place], [place] - [address] [facility] AT [address] [place] [address]    Sig: Take 1 capsule (40 mg) by mouth 2 times daily Take 30-60 minutes before eating.    Class: E-Prescribe    Route: Oral    famotidine (PEPCID) 20 MG tablet  20 tablet 0 5/28/2021    [place], [place] - [address]    Sig: Take 1 tablet (20 mg) by mouth 2 times daily as needed (abdominal pain)    Class: E-Prescribe    Route: Oral    fexofenadine (ALLEGRA) 180 MG tablet  90 tablet 3 9/7/2012     [facility]ORE #[id number] [place], [place] [address]E AT [address] [place] [address]    Sig: Take 1 tablet by mouth daily as needed.    Class: E-Prescribe    Route: Oral    fluticasone (FLONASE) 50 MCG/ACT nasal spray    8/3/2022    [facility] #[id number] [facility] [address] AT [address] [place] [address]    Sig: SHAKE LIQUID AND USE 1 TO 2 SPRAYS IN EACH NOSTRIL EVERY DAY    Class: Historical    folic acid (FOLVITE) 1 MG tablet  90 tablet 2 2022    [facility] #[id number] [facility] [address] [place] [address] AVE AT [address] [place] [address]    Sig: Take 1 tablet (1 mg) by mouth daily    Class: E-Prescribe    Route: Oral    insulin glargine (LANTUS PEN) 100 UNIT/ML pen  75 mL 3 2022    [facility] STORE #[id number] [place] [address] [address] [place] [address] AVE AT [address] [place] [address]    Sig: Inject 75 Units Subcutaneous every morning Or as directed.    Class: E-Prescribe    Notes to Pharmacy: If Lantus is not covered by insurance, may substitute Basaglar or Semglee or other insulin glargine product per insurance preference at same dose and frequency.      Route: Subcutaneous    ketoconazole (NIZORAL) 2 % shampoo            Sig: Apply topically daily as needed    Class: Historical    Route: Topical    levocetirizine (XYZAL) 5 MG tablet    8/3/2022        Sig: Take 5 mg by mouth daily    Class: Historical    Route: Oral    levonorgestrel (MIRENA) 20 MCG/DAY IUD            Si each by Intrauterine route once    Class: Historical    Route: Intrauterine    lisinopril-hydrochlorothiazide (ZESTORETIC) 20-25 MG tablet  30 tablet 11 2022    [id number] [place] [address] [address] [facility] AT [address] [place] [address]    Sig: Take 1 tablet by mouth daily . 30-day refills only.    Class: E-Prescribe    Route: Oral    magnesium 250 MG tablet  60 tablet 3 2022    [facility][id number] [facility] [address] AT [address] [place] [address]  [employer]    Sig: TAKE 1 TABLET(250 MG) BY MOUTH TWICE DAILY    Class: E-Prescribe    metFORMIN (GLUCOPHAGE-XR) 500 MG 24 hr tablet  120 tablet 11 3/3/2022    [facility] STORE #[id number] [place], [place] [address]E AT [address] [place] [address]    Sig: Take 4 tablets (2,000 mg) by mouth daily (with dinner)    Class: E-Prescribe    Route: Oral    metoprolol succinate ER (TOPROL XL) 100 MG 24 hr tablet  30 tablet 11 2022    [facility] STORE #[id number] [place] [address] AVE AT [address] [place] [address]    Sig: Take 1 tablet (100 mg) by mouth daily . 30-day refills only.    Class: E-Prescribe    Route: Oral    Multiple Vitamin (DAILY-GERALD MULTIVITAMIN) TABS  90 tablet 1 2022    [facility] #[id number] [place] [address]E AT [address] [place] [address]    Sig: Take 1 tablet by mouth daily    Class: E-Prescribe    Route: Oral    nitroGLYcerin (NITROSTAT) 0.4 MG sublingual tablet  25 tablet 3 2022    [facility] ST[facility] #[id number] [place] [address]E AT [address] [place] [address]    Sig: Place 1 tablet (0.4 mg) under the tongue every 5 minutes as needed for chest pain . After 3 doses, if pain persists call 911.    Class: E-Prescribe    Route: Sublingual    olopatadine (PATANOL) 0.1 % ophthalmic solution    8/3/2022    [facility] #[address] [place], [place] [address]E AT [address] [place] [address]    Class: Historical    pravastatin (PRAVACHOL) 40 MG tablet  30 tablet 11 2022    [facility] STORE #[id number] [place], [place] [address]E AT [address] [place] [address]    Sig: Take 1 tablet (40 mg) by mouth daily . 30-day refills only.    Class: E-Prescribe    Route: Oral    sennosides (SENOKOT) 8.6 MG tablet  60 tablet 1 2021    [facility] #[id number] - [facility], [place] - [address] [facility] AT [address] [address]    Si-2 tabs a day as needed for constipation    Class: E-Prescribe     sitagliptin (JANUVIA) 50 MG tablet  90 tablet 3 11/23/2022    [facility] #[id number] [facility] [address] AT [address] [place] [address]    Sig: Take 1 tablet (50 mg) by mouth daily    Class: E-Prescribe    Route: Oral    Prior authorization: Closed - Prior Authorization not required for patient/medication    spironolactone (ALDACTONE) 25 MG tablet  45 tablet 11 7/7/2022    [facility] #[id number] [facility] [address] AT [address] [place] [address]    Sig: Take 1 tablet (25 mg) by mouth daily . Take one additional 0.5 tab daily as needed for weight gain. 45 day refills.    Class: E-Prescribe    Route: Oral    sucralfate (CARAFATE) 1 GM tablet  120 tablet 3 8/19/2022    [facility] #[id number] [facility] [address] AT [address] [place] [address]    Sig: Take 1 tablet (1 g) by mouth 4 times daily    Class: E-Prescribe    Route: Oral    terbinafine (LAMISIL) 1 % external cream  42 g 1 2/11/2022    [address]    Sig: Apply topically 2 times daily    Class: E-Prescribe    Notes to Pharmacy: Please deliver to home address.    Route: Topical    terconazole (TERAZOL 3) 80 MG vaginal suppository  3 suppository 1 9/15/2022    [facility] #[id number] [place] [address] [address] AT [address] [place] [address]    Sig: Place 1 suppository (80 mg) vaginally At Bedtime    Class: E-Prescribe    Route: Vaginal    traMADol (ULTRAM) 50 MG tablet  60 tablet 3 6/4/2022    [facility] ST[facility] #[id number] [facility] [address] AT [address] [place] [address]    Sig: Take 1 tablet (50 mg) by mouth every 8 hours as needed for severe pain    Class: E-Prescribe    Route: Oral    Prior authorization: Approved    vitamin B-2 (RIBOFLAVIN) 100 MG TABS tablet  30 tablet 11 9/15/2022    [facility] ST[facility] #[id number] [facility] [address] AT [address] [place] [address]    Sig:  Take 2 tablets (200 mg) by mouth daily    Class: E-Prescribe    Notes to Pharmacy: Please check for any potential allergens -artificial sweeteners, pork, dairy. Please let patient know. Thank you    Route: Oral    vitamin D2 (ERGOCALCIFEROL) 97405 units (1250 mcg) capsule  12 capsule 0 2022    [facility] #[id number] [facility] [address] [place] [address] AVE AT [address] [place] [address]    Sig: Take 1 capsule (50,000 Units) by mouth once a week    Class: E-Prescribe    Route: Oral    blood glucose (NO BRAND SPECIFIED) lancets standard  400 each 3 3/1/2022    [facility] ST[facility] #[id number] [facility] [address] [place] [address] AVE AT [address] [place] [address]    Sig: Use to test blood sugar 1-4 times daily or as directed.    Class: E-Prescribe    Notes to Pharmacy: Patient has supply. Just sending in update prescription.    blood glucose (NO BRAND SPECIFIED) test strip  400 strip 3 3/1/2022    [facility] #[id number] [place] [address] [address] [place], [place] [address]E AT [address] [place] [address]    Sig: Use to test blood sugar 1-4 times daily or as directed.    Class: E-Prescribe    Blood Pressure Monitor KIT  1 kit 0 2019    [facility] #[id number] [place] [address] [address] [place], [place] [address]E AT [address] [place] [address]    Si each daily Monitor home blood pressure as instructed by physician.  Dispense [facility]on blood pressure kit.    Class: Local Print    Route: Does not apply    clopidogrel (PLAVIX) 75 MG tablet  30 tablet 11 2022    [facility] #[id number] [facility] [address] [place], [place] [address]E AT [address] [place] [address]    Sig: Take 1 tablet (75 mg) by mouth daily . 30-day refills only.    Class: E-Prescribe    Route: Oral    COMPRESSION STOCKINGS  4 each 2 2019    [facility][id number] [facility] [address] [place], [place] [address]E AT [address] [place] [address]    Si each daily Apply one 10-15 mmHg compression stocking to each lower extgmierty during the day and remove before bedtime.    Class:  Local Print    Route: Does not apply    empagliflozin (JARDIANCE) 10 MG TABS tablet  30 tablet 4 6/3/2022    [facility] #[id number] [place] [address] AT [address] [place] [address]    Sig: Take 1 tablet (10 mg) by mouth daily    Class: E-Prescribe    Route: Oral    EPIPEN 2-RIKY 0.3 MG/0.3ML injection   0 9/22/2016        Sig: INJECT 0.3 MG INTO THE MUSCLE PRF ANAPHYALAXIS    Class: Historical    insulin pen needle (BD MARCK U/F) 32G X 4 MM miscellaneous  300 each 3 3/1/2022    [facility] #[id number] - [facility] [address] AT [address] [place] [address]    Sig: USE DAILY OR AS DIRECTED    Class: E-Prescribe    prochlorperazine (COMPAZINE) 5 MG tablet  60 tablet 3 8/19/2022    [facility] #[id number] [facility] [address] AT [address] [place] [address]    Sig: Take 1 tablet (5 mg) by mouth every 6 hours as needed for nausea or vomiting    Class: E-Prescribe    Route: Oral        Allergies   Allergen Reactions     Amoxicillin-Pot Clavulanate      Artificial Sweetner (Informational Only)  Other (See Comments)     Increased headache     Augmentin      Unknown possible hives and edema     Azithromycin      Diatrizoate Other (See Comments)     Pt wants this listed because she is allergic to shellfish      Imitrex [Sumatriptan]      Severe face/neck/chest tightness and flushing side effects      Penicillins Hives     Unknown      Pork Allergy      Stomach pain, cramping, diarrhea, itching, nausea and headaches     Shellfish Allergy Hives and Swelling     Sulfa Drugs Hives and Swelling     Zithromax [Azithromycin Dihydrate] Swelling     Unknown      Immunization History   Administered Date(s) Administered     COVID-19 Vaccine 12+ (Pfizer 2022) 02/04/2022, 02/25/2022     Flu, Unspecified 11/16/1994     Influenza (IIV3) PF 01/11/2001, 12/08/2005, 11/30/2006, 01/07/2008, 10/13/2008, 10/21/2011, 11/21/2012, 10/10/2013, 10/14/2016     Influenza Vaccine >6 months  (Alfuria,Fluzone) 10/06/2014, 2015, 10/04/2017, 2018, 2022     Pneumo Conj 13-V (2010&after) 2016     TDAP Vaccine (Adacel) 2006     TDAP Vaccine (Boostrix) 2016       OB History    Para Term  AB Living   4 1 1 0 3 1   SAB IAB Ectopic Multiple Live Births   0 3 0 0 1     Past Medical History:   Diagnosis Date     Abnormal glandular Papanicolaou smear of cervix      Allergic rhinitis     Allergy, airborne subst     Arthritis      ASCVD (arteriosclerotic cardiovascular disease)      Chronic pain      Chronic pancreatitis (H)     idiopathic, Tx: PPI, antioxidants, pancreatic enzymes     Common migraine      Coronary artery disease      Costochondritis      Difficulty in walking(719.7)      Dyspnea on exertion      Ectasia, mammary duct     [facility], persistent nipple discharge     Elevated fasting glucose      Gastro-oesophageal reflux disease      Granulomatosis with polyangiitis (H)      Hernia      History of angina      Hyperlipidemia LDL goal < 100      Hypertension goal BP (blood pressure) < 140/90     Essential hypertension     Iron deficiency anemia      Ischemic cardiomyopathy      Menorrhagia      Migraine headaches      Mild persistent asthma      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 Matias, neg w/u, Rx'd w/prednisone     Ventral hernia, unspecified, without mention of obstruction or gangrene 2012     Past Surgical History:   Procedure Laterality Date     C/SECTION, LOW TRANSVERSE  1996         CARDIAC SURGERY      cardiac stent- recent in 16; 4 other stents     DILATION AND CURETTAGE N/A 2016    Procedure: DILATION AND CURETTAGE;  Surgeon: Nahed Butler MD;  Location: [facility]     ESOPHAGOSCOPY, GASTROSCOPY, DUODENOSCOPY (EGD), COMBINED N/A 2022    Procedure: ESOPHAGOGASTRODUODENOSCOPY, WITH BIOPSY;  Surgeon: Enzo Sesay MD;  Location:  GI     [facility] UGI ENDOSCOPY W EUS  2008    Dr. Pastrana, possible chronic pancreatitis, fatty liver     HERNIA REPAIR  2012    done at [facility]     INSERT INTRAUTERINE DEVICE N/A 2016    Procedure: INSERT INTRAUTERINE DEVICE;  Surgeon: Nahed Butler MD;  Location: [facility] OR     INT UTERINE FIBRIOD EMBOLIZATION  10/29/2014     LAPAROSCOPIC CHOLECYSTECTOMY  2008    Dr. Arnol GRUBBS TX, CERVICAL  1992    s/p HUMERA, done at [facility] in [place].     ORBITOTOMY Right 03/15/2016    Procedure: ORBITOTOMY;  Surgeon: Myron Cyr MD;  Location: [facility]     ORBITOTOMY Right 2017    Procedure: ORBITOTOMY;  RIGHT ORBITOTOMY AND BIOPSY;  Surgeon: Charis Holbrook MD;  Location: [facility]     REPAIR PTOSIS Right 2017    Procedure: REPAIR PTOSIS;  RIGHT UPPER LID PTOSIS REPAIR;  Surgeon: Myron Cyr MD;  Location: [facility]     UPPER GI ENDOSCOPY  10/21/2008    mild gastritis, Dr. Rocky [facility]A ECHO HEART XTHORACIC,COMPLETE, W/O DOPPLER  2004    [facility] WNL, EF 60%     Family History   Problem Relation Age of Onset     Heart Disease Father 50        heart attack     Cerebrovascular Disease Father      Diabetes Father      Hypertension Father      Depression Father      C.A.D. Father      Hypertension Mother      Arthritis Mother      Heart Disease Mother          long qt syndrome     Thyroid Disease Mother      C.A.D. Mother      Heart Disease Brother 15        MI at 15, 16.      Diabetes Maternal Uncle      Hypertension Maternal Aunt      Hypertension Maternal Uncle      Arthritis Brother         he passed away, had severe arthritis at age 11     Arthritis Maternal Uncle      Eye Disorder Maternal Uncle         cataracts     Neurologic Disorder Sister         migraines     Neurologic Disorder Sister         migraines     Respiratory Son         asthma     Cerebrovascular Disease Maternal Uncle      C.A.D. Brother      Family History Negative Other         neg for RA, SLE     Unknown/Adopted No family hx of         unknown neurological issues in her family, mother was adopted     Skin Cancer No family hx of         No known family hx of skin cancer     Social History     Socioeconomic History     Marital status: Single     Spouse name: None     Number of children: 1     Years of education: None     Highest education level: None   Occupational History     Employer: NONE    Tobacco Use     Smoking status: Some Days     Packs/day: 0.20     Years: 1.00     Pack years: 0.20     Types: Cigarettes     Last attempt to quit: 2016     Years since quittin.8     Smokeless tobacco: Never   Substance and Sexual Activity     Alcohol use: No     Alcohol/week: 0.0 standard drinks     Drug use: No     Sexual activity: Not Currently   Other Topics Concern     Parent/sibling w/ CABG, MI or angioplasty before 65F 55M? Yes   Social History Narrative    Balanced Diet - sometimes    Osteoporosis Prevention Measures - Dairy servings per day: 2 servings weekly    Regular Exercise -  Yes Describe walking 4 times a week    Dental Exam - NO    Seatbelts used - Yes    Self Breast Exam - Yes    Abuse: Current or Past (Physical, Sexual or Emotional)- No    Do you have any concerns about STD's -  No    Do you feel safe in your environment - No    Guns stored in the home - No     "Sunscreen used - Yes    Lipids -  YES - Date: 053102    Glucose -  YES - Date: 040409    Eye Exam - YES - Date: one year ago    Colon Cancer Screening - No    Hemoccults - NO    Pap Test -  YES - Date: 070904, remote history of LEEP    Mammography - YES - Date: last spring, would like to discuss, needs a referral to [facility]    DEXA - NO    Immunizations reviewed and up to date - Yes, last td given in 1997 or 1998       ROS    EXAM:  Blood pressure 108/76, pulse 85, height 1.575 m (5' 2\"), weight 74.4 kg (164 lb), not currently breastfeeding. Body mass index is 30 kg/m .  General appearance: Pleasant female in no acute distress.     BREAST EXAM:  Breast: Without visible skin changes. No dimpling or lesions seen.   Breasts supple, non-tender with palpation, no dominant mass, nodularity, or nipple discharge noted bilaterally. Axillary nodes negative.      PELVIC EXAM:  EG/BUS: Normal genital architecture without lesions, erythema or abnormal secretions Bartholin's, Urethra, [place]'s normal   Urethral meatus: normal    Urethra: no masses, tenderness, or scarring    Bladder: no masses or tenderness    Vagina: atrophic, thin, dry with creamy, odorless and yellow  secretions  Cervix: no lesions and IUD strings extend 2 cm from external os.  Uterus: nodular 10 wk size  Adnexa: Within normal limits and No masses, nodularity, tenderness  Rectum:anus normal     Wet prep: neg yeast and clue and trich, +++ WBC, elevated pH, basal cells present, c/w atrophic vaginitis    ASSESSMENT:  Encounter Diagnoses   Name Primary?     Encounter for gynecological examination without abnormal finding Yes     Screening for malignant neoplasm of cervix      Atrophic vaginitis      Other social stressor       57 year old Female Pelvic and Breast Exam  Atrophic vaginitis  History of recurrent yeast infections  Housing stress      PLAN:   Orders Placed This Encounter   Procedures     Pelvic and Breast Exam Procedure []     " Pap Smear Exam []     Social work referral sent, patient appreciative    Discussed wet prep findings, recommend vaginal estradiol twice weekly, if unable to manage symptoms or recurrent yeast diagnosed, then consider course of weekly diflucan x 6 months.  Patient aware that controlling DM will help decrease recurrence of yeast infections.  Return in one year/PRN for preventive care or problems/concerns.     Verbalized understanding and agreement with visit plan.    Nahed Butler MD

## 2022-12-15 NOTE — PROGRESS NOTES
Progress Note    SUBJECTIVE:  Janine Cornell is an 57 year old  , who requests a breast and pelvic exam.  She is struggling with issues surrounding her housing and this is creating significant stress, sadness and anxiety.  She used to live just a half block from where Chemo Ortiz was murdered. She had to move following that and feels she lost quite a bit in the move and is not comfortable in her current housing due to health concerns.  She feels her mood issues are predominately related to this issue and agrees to speak with social work.  She declines mental health referral or to initiate selective serotonin reuptake inhibitor.    Patient is followed by Dr. Solano for primary care.    Concerns today include: Since  has lots of issues with yeast infections, thrush, skin.  Has not had good control of diabetes and working Dr. Solano to improve control.  Can't get in to see endocrine until May, 2023.    Today having some vaginal discharge.  Took Diflucan a week ago.    Had D&C and IUD placement in .  Has rare spotting, no regular bleeding.  Having significant vasomotor symptoms.  Not sexually active, has not noted vaginal dryness.  Has urinary urgency with episodes of incontinence and enuresis.     Prefers to wait until next year to remove IUD (7 years after placement), concerned if removes and not menopausal she would be miserable.    Menstrual History:  Menstrual History 2017   LAST MENSTRUAL PERIOD 5/15/2015 2015 2016 2016 2017       Last    Lab Results   Component Value Date    PAP NIL 2016     History of abnormal Pap smear: NO - age 30-65 PAP every 5 years with negative HPV co-testing recommended    Last   Lab Results   Component Value Date    HPV16 Negative 2016     Last   Lab Results   Component Value Date    HPV18 Negative 2016     Last   Lab Results   Component Value Date    HRHPV Negative  07/28/2016       Mammogram current: has scheduled for 12/19/22    HISTORY:  Prescription Medications as of 12/15/2022       Rx Number Disp Refills Start End Last Dispensed Date Next Fill Date Owning Pharmacy    acetaminophen (TYLENOL) 325 MG tablet  250 tablet 4 1/21/2021    [id number] [place], [place] - [facility] AT [address] [place] [address]    Sig: Take 1-2 tablets (325-650 mg) by mouth every 6 hours as needed for pain (headache)    Class: E-Prescribe    Route: Oral    ADVAIR DISKUS 250-50 MCG/ACT inhaler    8/8/2022        Class: Historical    albuterol (2.5 MG/3ML) 0.083% neb solution   1 10/21/2016        Sig: INL 1 VIAL VIA NEBULIZATION Q 4 TO 6 HOURS PRN    Class: Historical    albuterol (PROAIR HFA, PROVENTIL HFA, VENTOLIN HFA) 108 (90 BASE) MCG/ACT inhaler  3 Inhaler 1 10/6/2014    [id number] [place] [address] AT [address] [place] [address]    Sig: Inhale 2 puffs into the lungs every 6 hours as needed for shortness of breath / dyspnea or wheezing    Class: E-Prescribe    Notes to Pharmacy: Profile Rx: patient will contact pharmacy when needed    Route: Inhalation    azelastine (ASTELIN) 0.1 % nasal spray    8/3/2022    [facility] #[id number] [place] [id number] AT [address] [place] [address]    Sig: USE 1 TO 2 SPRAYS IN EACH NOSTRIL TWICE DAILY AS NEEDED    Class: Historical    BANOPHEN 25 MG tablet    10/29/2022        Sig: Take 25 mg by mouth At Bedtime    Class: Historical    Route: Oral    calcium carbonate (TUMS) 500 MG chewable tablet  90 tablet 3 5/10/2021    [facility] ST[facility] #[id number] [facility] [address] [facility] AT [address]    Sig: Take 3-4 tablets (1,500-2,000 mg) by mouth daily as needed    Class: E-Prescribe    Route: Oral    cyclobenzaprine (FLEXERIL) 10 MG tablet  60 tablet 3 11/5/2021    [facility] ST[facility] #[id number] [facility] AT [address] &  [facility]    Sig: Take 1 tablet (10 mg) by mouth 2 times daily as needed for muscle spasms    Class: E-Prescribe    Route: Oral    dicyclomine (BENTYL) 20 MG tablet  60 tablet 0 11/12/2019    [facility] #[id number] [facility] [address] AT [address] [place] [address]    Sig: TAKE 1 TABLET(20 MG) BY MOUTH FOUR TIMES DAILY AS NEEDED. [facility] schedule clinic appt for refills.    Class: E-Prescribe    diphenhydrAMINE (BENADRYL) 25 MG capsule  30 capsule 2 2/2/2021    [facility] [place], [place] - [address] [facility] [address]    Sig: Take 1 capsule (25 mg) by mouth nightly as needed for itching or allergies    Class: E-Prescribe    Route: Oral    Ergocalciferol (VITAMIN D2) 50 MCG (2000 UT) TABS  90 tablet 1 9/27/2021    [facility] #[id number] [place], [place] - [address] AT [address] [place] [address]    Sig: Take 2,000 Units by mouth daily    Class: E-Prescribe    Route: Oral    esomeprazole (NEXIUM) 40 MG DR capsule  180 capsule 0 5/14/2022    [id number] [address] AT [address] [place] [address]    Sig: Take 1 capsule (40 mg) by mouth 2 times daily Take 30-60 minutes before eating.    Class: E-Prescribe    Route: Oral    famotidine (PEPCID) 20 MG tablet  20 tablet 0 5/28/2021    [facility] [place], [place] - [address] [facility]    Sig: Take 1 tablet (20 mg) by mouth 2 times daily as needed (abdominal pain)    Class: E-Prescribe    Route: Oral    fexofenadine (ALLEGRA) 180 MG tablet  90 tablet 3 9/7/2012    [id number] [place], [place] - [address] [facility] [address] AT [address] [place] [address]    Sig: Take 1 tablet by mouth daily as needed.    Class: E-Prescribe    Route: Oral    fluticasone (FLONASE) 50 MCG/ACT nasal spray    8/3/2022    [facility][id number] [place], [place] - [address] [facility] [address] AT [address] [place] [address]    Sig: SHAKE LIQUID AND USE 1 TO 2 SPRAYS IN EACH  NOSTRIL EVERY DAY    Class: Historical    folic acid (FOLVITE) 1 MG tablet  90 tablet 2 2022    [facility] ST[facility] #[id number] [place] [address] AVE AT [address] [place] [address]    Sig: Take 1 tablet (1 mg) by mouth daily    Class: E-Prescribe    Route: Oral    insulin glargine (LANTUS PEN) 100 UNIT/ML pen  75 mL 3 2022    [facility] STORE #[id number] [place] [address] AVE AT [address] [place] [address]    Sig: Inject 75 Units Subcutaneous every morning Or as directed.    Class: E-Prescribe    Notes to Pharmacy: If Lantus is not covered by insurance, may substitute Basaglar or Semglee or other insulin glargine product per insurance preference at same dose and frequency.      Route: Subcutaneous    ketoconazole (NIZORAL) 2 % shampoo            Sig: Apply topically daily as needed    Class: Historical    Route: Topical    levocetirizine (XYZAL) 5 MG tablet    8/3/2022        Sig: Take 5 mg by mouth daily    Class: Historical    Route: Oral    levonorgestrel (MIRENA) 20 MCG/DAY IUD            Si each by Intrauterine route once    Class: Historical    Route: Intrauterine    lisinopril-hydrochlorothiazide (ZESTORETIC) 20-25 MG tablet  30 tablet 11 2022    [facility] STORE #[id number] [place], [place] [address]E AT [address] [place] [address]    Sig: Take 1 tablet by mouth daily . 30-day refills only.    Class: E-Prescribe    Route: Oral    magnesium 250 MG tablet  60 tablet 3 2022    [facility] STORE #[id number] [place] [address] AVE AT [address] [place] [address]    Sig: TAKE 1 TABLET(250 MG) BY MOUTH TWICE DAILY    Class: E-Prescribe    metFORMIN (GLUCOPHAGE-XR) 500 MG 24 hr tablet  120 tablet 11 3/3/2022    [facility] STORE #[id number] [place], [place] [address]E AT [address] [place] [address]    Sig: Take 4 tablets (2,000 mg) by mouth daily (with dinner)    Class: E-Prescribe    Route: Oral    metoprolol succinate ER (TOPROL XL) 100  MG 24 hr tablet  30 tablet 11 2022    [facility] DR[facility] STORE #[id number] [place], [place] [address] AT [address] [place] [address]    Sig: Take 1 tablet (100 mg) by mouth daily . 30-day refills only.    Class: E-Prescribe    Route: Oral    Multiple Vitamin (DAILY-GERALD MULTIVITAMIN) TABS  90 tablet 1 2022    [facility] ST[facility] #[address] [place], [place] [address]E AT [address] [place] [address]    Sig: Take 1 tablet by mouth daily    Class: E-Prescribe    Route: Oral    nitroGLYcerin (NITROSTAT) 0.4 MG sublingual tablet  25 tablet 3 2022    [facility] ST[facility] #[address] [place], [place] [address] AT [address] [place] [address]    Sig: Place 1 tablet (0.4 mg) under the tongue every 5 minutes as needed for chest pain . After 3 doses, if pain persists call 911.    Class: E-Prescribe    Route: Sublingual    olopatadine (PATANOL) 0.1 % ophthalmic solution    8/3/2022    [facility] #[address] [place], [place] [address] AT [address] [place] [address]    Class: Historical    pravastatin (PRAVACHOL) 40 MG tablet  30 tablet 11 2022    [facility] STORE #[address] [place] [address] AT [address] [place] [address]    Sig: Take 1 tablet (40 mg) by mouth daily . 30-day refills only.    Class: E-Prescribe    Route: Oral    sennosides (SENOKOT) 8.6 MG tablet  60 tablet 1 2021    [facility] #[id number] [facility], [place] - [address] [facility]    Si-2 tabs a day as needed for constipation    Class: E-Prescribe    sitagliptin (JANUVIA) 50 MG tablet  90 tablet 3 2022    [facility] ST[facility] #[id number] [place], [place] [address] [facility] AT [address] [place] [address]    Sig: Take 1 tablet (50 mg) by mouth daily    Class: E-Prescribe    Route: Oral    Prior authorization: Closed - Prior Authorization not required for patient/medication    spironolactone (ALDACTONE) 25 MG tablet  45 tablet 11 2022     [facility] STORE #[id number] [place], [place] [address] AT [address] [place] [address]    Sig: Take 1 tablet (25 mg) by mouth daily . Take one additional 0.5 tab daily as needed for weight gain. 45 day refills.    Class: E-Prescribe    Route: Oral    sucralfate (CARAFATE) 1 GM tablet  120 tablet 3 8/19/2022    [facility] STORE #[id number] [place] [address] [address] AT [address] [place] [address]    Sig: Take 1 tablet (1 g) by mouth 4 times daily    Class: E-Prescribe    Route: Oral    terbinafine (LAMISIL) 1 % external cream  42 g 1 2/11/2022    [address]    Sig: Apply topically 2 times daily    Class: E-Prescribe    Notes to Pharmacy: Please deliver to home address.    Route: Topical    terconazole (TERAZOL 3) 80 MG vaginal suppository  3 suppository 1 9/15/2022    [facility] #[id number] [place], [place] [address] [facility] AT [address] [place] [address]    Sig: Place 1 suppository (80 mg) vaginally At Bedtime    Class: E-Prescribe    Route: Vaginal    traMADol (ULTRAM) 50 MG tablet  60 tablet 3 6/4/2022    [facility] #[id number] [place], [place] [address] AT [address] [place] [address]    Sig: Take 1 tablet (50 mg) by mouth every 8 hours as needed for severe pain    Class: E-Prescribe    Route: Oral    Prior authorization: Approved    vitamin B-2 (RIBOFLAVIN) 100 MG TABS tablet  30 tablet 11 9/15/2022    [facility] #[id number] [place], [place] [address] AT [address] [place] [address]    Sig: Take 2 tablets (200 mg) by mouth daily    Class: E-Prescribe    Notes to Pharmacy: Please check for any potential allergens -artificial sweeteners, pork, dairy. Please let patient know. Thank you    Route: Oral    vitamin D2 (ERGOCALCIFEROL) 49553 units (1250 mcg) capsule  12 capsule 0 11/4/2022    [facility] #[id number] [place] [address] AT [address] [place] [address]     Sig: Take 1 capsule (50,000 Units) by mouth once a week    Class: E-Prescribe    Route: Oral    blood glucose (NO BRAND SPECIFIED) lancets standard  400 each 3 3/1/2022    [facility] #[id number] [place] [address] AVE AT [address] [place] [address]    Sig: Use to test blood sugar 1-4 times daily or as directed.    Class: E-Prescribe    Notes to Pharmacy: Patient has supply. Just sending in update prescription.    blood glucose (NO BRAND SPECIFIED) test strip  400 strip 3 3/1/2022    [facility] STORE #[id number] [facility] [address] [place] [address] AVE AT [address] [place] [address]    Sig: Use to test blood sugar 1-4 times daily or as directed.    Class: E-Prescribe    Blood Pressure Monitor KIT  1 kit 0 2019    [facility] #[id number] [place] [address] [address] [place], [place] [address]E AT [address] [place] [address]    Si each daily Monitor home blood pressure as instructed by physician.  Dispense O[place]on blood pressure kit.    Class: Local Print    Route: Does not apply    clopidogrel (PLAVIX) 75 MG tablet  30 tablet 11 2022    [facility] #[id number] [facility] [address] [place] [address] AVE AT [address] [place] [address]    Sig: Take 1 tablet (75 mg) by mouth daily . 30-day refills only.    Class: E-Prescribe    Route: Oral    COMPRESSION STOCKINGS  4 each 2 2019    [facility] #[id number] [place], [place] [address]E AT [address] [place] [address]    Si each daily Apply one 10-15 mmHg compression stocking to each lower extgmierty during the day and remove before bedtime.    Class: Local Print    Route: Does not apply    empagliflozin (JARDIANCE) 10 MG TABS tablet  30 tablet 4 6/3/2022    [facility][id number] [facility] [address] [place] [address] AVE AT [address] [place] [address]    Sig: Take 1 tablet (10 mg) by mouth daily    Class: E-Prescribe    Route: Oral    EPIPEN 2-RIKY 0.3 MG/0.3ML injection   0 2016        Sig: INJECT 0.3 MG INTO THE MUSCLE PRF ANAPHYALAXIS     Class: Historical    insulin pen needle (BD MARCK U/F) 32G X 4 MM miscellaneous  300 each 3 3/1/2022    [facility][id number] - [place] [address] AT [address] [place] [address]    Sig: USE DAILY OR AS DIRECTED    Class: E-Prescribe    prochlorperazine (COMPAZINE) 5 MG tablet  60 tablet 3 2022    [facility][id number] - [place] [address] [facility] AT [address] [place] [address]    Sig: Take 1 tablet (5 mg) by mouth every 6 hours as needed for nausea or vomiting    Class: E-Prescribe    Route: Oral        Allergies   Allergen Reactions     Amoxicillin-Pot Clavulanate      Artificial Sweetner (Informational Only)  Other (See Comments)     Increased headache     Augmentin      Unknown possible hives and edema     Azithromycin      Diatrizoate Other (See Comments)     Pt wants this listed because she is allergic to shellfish      Imitrex [Sumatriptan]      Severe face/neck/chest tightness and flushing side effects      Penicillins Hives     Unknown      Pork Allergy      Stomach pain, cramping, diarrhea, itching, nausea and headaches     Shellfish Allergy Hives and Swelling     Sulfa Drugs Hives and Swelling     Zithromax [Azithromycin Dihydrate] Swelling     Unknown      Immunization History   Administered Date(s) Administered     COVID-19 Vaccine 12+ (Pfizer ) 2022, 2022     Flu, Unspecified 1994     Influenza (IIV3) PF 2001, 2005, 2006, 2008, 10/13/2008, 10/21/2011, 2012, 10/10/2013, 10/14/2016     Influenza Vaccine >6 months (Alfuria,Fluzone) 10/06/2014, 2015, 10/04/2017, 2018, 2022     Pneumo Conj 13-V (2010&after) 2016     TDAP Vaccine (Adacel) 2006     TDAP Vaccine (Boostrix) 2016       OB History    Para Term  AB Living   4 1 1 0 3 1   SAB IAB Ectopic Multiple Live Births   0 3 0 0 1     Past Medical History:   Diagnosis Date     Abnormal glandular Papanicolaou smear  of cervix      Allergic rhinitis     Allergy, airborne subst     Arthritis      ASCVD (arteriosclerotic cardiovascular disease)      Chronic pain      Chronic pancreatitis (H)     idiopathic, Tx: PPI, antioxidants, pancreatic enzymes     Common migraine      Coronary artery disease      Costochondritis      Difficulty in walking(719.7)      Dyspnea on exertion      Ectasia, mammary duct     followed by [facility], persistent nipple discharge     Elevated fasting glucose      Gastro-oesophageal reflux disease      Granulomatosis with polyangiitis (H)      Hernia      History of angina      Hyperlipidemia LDL goal < 100      Hypertension goal BP (blood pressure) < 140/90     Essential hypertension     Iron deficiency anemia      Ischemic cardiomyopathy      Menorrhagia      Migraine headaches      Mild persistent asthma      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 Matias, neg w/u, Rx'd w/prednisone     Ventral hernia, unspecified, without mention of obstruction or gangrene 2012     Past Surgical History:   Procedure Laterality Date     C/SECTION, LOW TRANSVERSE  1996         CARDIAC SURGERY      cardiac stent- recent in 16; 4 other stents     DILATION AND CURETTAGE N/A 2016    Procedure: DILATION AND CURETTAGE;  Surgeon: Carrie  Nahed Bolden MD;  Location: [facility]     ESOPHAGOSCOPY, GASTROSCOPY, DUODENOSCOPY (EGD), COMBINED N/A 2022    Procedure: ESOPHAGOGASTRODUODENOSCOPY, WITH BIOPSY;  Surgeon: Enzo Sesay MD;  Location[facility] GI     HC UGI ENDOSCOPY W EUS  2008    Dr. Pastrana, possible chronic pancreatitis, fatty liver     HERNIA REPAIR  2012    [place] [facility]     INSERT INTRAUTERINE DEVICE N/A 2016    Procedure: INSERT INTRAUTERINE DEVICE;  Surgeon: Nahed [place] MD;  Location: [facility] OR     INT UTERINE FIBRIOD EMBOLIZATION  10/29/2014     LAPAROSCOPIC CHOLECYSTECTOMY  2008    Dr. Arnol GRUBBS TX, CERVICAL  1992    s/p HUMERA, done at [facility] in [place].     ORBITOTOMY Right 03/15/2016    Procedure: ORBITOTOMY;  Surgeon: Myron Cyr MD;  Location:  [facility]     ORBITOTOMY Right 2017    Procedure: ORBITOTOMY;  RIGHT ORBITOTOMY AND BIOPSY;  Surgeon: Charis Holbrook MD;  Location: [facility]     REPAIR PTOSIS Right 2017    Procedure: REPAIR PTOSIS;  RIGHT UPPER LID PTOSIS REPAIR;  Surgeon: Myron Cyr MD;  Location: [facility]     UPPER GI ENDOSCOPY  10/21/2008    mild gastritis, Dr. Rocky [facility] ECHO HEART XTHORACIC,COMPLETE, W/O DOPPLER  2004    [facility] WNL, EF 60%     Family History   Problem Relation Age of Onset     Heart Disease Father 50        heart attack     Cerebrovascular Disease Father      Diabetes Father      Hypertension Father      Depression Father      C.A.D. Father      Hypertension Mother      Arthritis Mother      Heart Disease Mother         long qt syndrome     Thyroid Disease Mother      C.A.D. Mother      Heart Disease Brother 15        MI at 15, 16.      Diabetes Maternal Uncle      Hypertension Maternal Aunt      Hypertension Maternal Uncle      Arthritis Brother         he passed away, had severe arthritis at age 11     Arthritis Maternal Uncle      Eye Disorder Maternal Uncle         cataracts     Neurologic  Disorder Sister         migraines     Neurologic Disorder Sister         migraines     Respiratory Son         asthma     Cerebrovascular Disease Maternal Uncle      C.A.D. Brother      Family History Negative Other         neg for RA, SLE     Unknown/Adopted No family hx of         unknown neurological issues in her family, mother was adopted     Skin Cancer No family hx of         No known family hx of skin cancer     Social History     Socioeconomic History     Marital status: Single     Spouse name: None     Number of children: 1     Years of education: None     Highest education level: None   Occupational History     Employer: NONE    Tobacco Use     Smoking status: Some Days     Packs/day: 0.20     Years: 1.00     Pack years: 0.20     Types: Cigarettes     Last attempt to quit: 2016     Years since quittin.8     Smokeless tobacco: Never   Substance and Sexual Activity     Alcohol use: No     Alcohol/week: 0.0 standard drinks     Drug use: No     Sexual activity: Not Currently   Other Topics Concern     Parent/sibling w/ CABG, MI or angioplasty before 65F 55M? Yes   Social History Narrative    Balanced Diet - sometimes    Osteoporosis Prevention Measures - Dairy servings per day: 2 servings weekly    Regular Exercise -  Yes Describe walking 4 times a week    Dental Exam - NO    Seatbelts used - Yes    Self Breast Exam - Yes    Abuse: Current or Past (Physical, Sexual or Emotional)- No    Do you have any concerns about STD's -  No    Do you feel safe in your environment - No    Guns stored in the home - No    Sunscreen used - Yes    Lipids -  YES - Date:     Glucose -  YES - Date:     Eye Exam - YES - Date: one year ago    Colon Cancer Screening - No    Hemoccults - NO    Pap Test -  YES - Date: , remote history of LEEP    Mammography - YES - Date: last spring, would like to discuss, needs [facility] referral to [facility]    DEXA - NO    Immunizations reviewed and up to date -  "Yes, last td given in 1997 or 1998       ROS    EXAM:  Blood pressure 108/76, pulse 85, height 1.575 m (5' 2\"), weight 74.4 kg (164 lb), not currently breastfeeding. Body mass index is 30 kg/m .  General appearance: Pleasant female in no acute distress.     BREAST EXAM:  Breast: Without visible skin changes. No dimpling or lesions seen.   Breasts supple, non-tender with palpation, no dominant mass, nodularity, or nipple discharge noted bilaterally. Axillary nodes negative.      PELVIC EXAM:  EG/BUS: Normal genital architecture without lesions, erythema or abnormal secretions Bartholin's, Urethra, [place]'s normal   Urethral meatus: normal    Urethra: no masses, tenderness, or scarring    Bladder: no masses or tenderness    Vagina: atrophic, thin, dry with creamy, odorless and yellow  secretions  Cervix: no lesions and IUD strings extend 2 cm from external os.  Uterus: nodular 10 wk size  Adnexa: Within normal limits and No masses, nodularity, tenderness  Rectum:anus normal     Wet prep: neg yeast and clue and trich, +++ WBC, elevated pH, basal cells present, c/w atrophic vaginitis    ASSESSMENT:  Encounter Diagnoses   Name Primary?     Encounter for gynecological examination without abnormal finding Yes     Screening for malignant neoplasm of cervix      Atrophic vaginitis      Other social stressor       57 year old Female Pelvic and Breast Exam  Atrophic vaginitis  History of recurrent yeast infections  Housing stress      PLAN:   Orders Placed This Encounter   Procedures     Pelvic and Breast Exam Procedure []     Pap Smear Exam []     Social work referral sent, patient appreciative    Discussed wet prep findings, recommend vaginal estradiol twice weekly, if unable to manage symptoms or recurrent yeast diagnosed, then consider course of weekly diflucan x 6 months.  Patient aware that controlling DM will help decrease recurrence of yeast infections.  Return in one year/PRN for preventive care or " problems/concerns.     Verbalized understanding and agreement with visit plan.

## 2022-12-15 NOTE — TELEPHONE ENCOUNTER
Called and spoke with Lety Dotson () regarding this patient.  Dr Butler has placed an Epic referral..     Pt scored high on PHQ-9, feels that most of her stress and depression are related to housing insecurity    Lety will reach out

## 2022-12-17 ENCOUNTER — OFFICE VISIT (OUTPATIENT)
Dept: FAMILY MEDICINE | Facility: CLINIC | Age: 57
End: 2022-12-17
Payer: COMMERCIAL

## 2022-12-17 VITALS
BODY MASS INDEX: 31.09 KG/M2 | OXYGEN SATURATION: 99 % | DIASTOLIC BLOOD PRESSURE: 68 MMHG | HEART RATE: 74 BPM | SYSTOLIC BLOOD PRESSURE: 100 MMHG | WEIGHT: 170 LBS

## 2022-12-17 DIAGNOSIS — E11.9 TYPE 2 DIABETES, HBA1C GOAL < 8% (H): ICD-10-CM

## 2022-12-17 DIAGNOSIS — R05.9 COUGH, UNSPECIFIED TYPE: Primary | ICD-10-CM

## 2022-12-17 LAB
FLUAV AG SPEC QL IA: NEGATIVE
FLUBV AG SPEC QL IA: NEGATIVE

## 2022-12-17 PROCEDURE — 99213 OFFICE O/P EST LOW 20 MIN: CPT | Mod: CS | Performed by: FAMILY MEDICINE

## 2022-12-17 PROCEDURE — 87804 INFLUENZA ASSAY W/OPTIC: CPT | Performed by: PATHOLOGY

## 2022-12-17 PROCEDURE — U0003 INFECTIOUS AGENT DETECTION BY NUCLEIC ACID (DNA OR RNA); SEVERE ACUTE RESPIRATORY SYNDROME CORONAVIRUS 2 (SARS-COV-2) (CORONAVIRUS DISEASE [COVID-19]), AMPLIFIED PROBE TECHNIQUE, MAKING USE OF HIGH THROUGHPUT TECHNOLOGIES AS DESCRIBED BY CMS-2020-01-R: HCPCS | Performed by: FAMILY MEDICINE

## 2022-12-17 NOTE — LETTER
December 19, 2022      Janine Cornell  [address] [facility] [address]        Dear ,    We are writing to inform you of your test results.    Jainne  Here is the result I just called you about  Since symptoms for nearly two weeks we won't use meds  Call if worse, but I think if you were going to get very ill from this it would have happened already    Resulted Orders   Influenza A/B antigen - Rapid Nasal Swab, Clinic Collect   Result Value Ref Range    Influenza A antigen Negative Negative    Influenza B antigen Negative Negative    Narrative    Test results must be correlated with clinical data. If necessary, results should be confirmed by a molecular assay or viral culture.   Symptomatic COVID-19 Virus (Coronavirus) by PCR Nose   Result Value Ref Range    SARS CoV2 PCR Positive (A) Negative      Comment:      POSITIVE: SARS-CoV-2 (COVID-19) RNA detected, presumed positive.    Narrative    Testing was performed using the UI Robotima SARS-CoV-2 Assay on the  [employer] Instrument System. Additional information about this  Emergency Use Authorization (EUA) assay can be found via the Lab  Guide. This test should be ordered for the detection of SARS-CoV-2 in  individuals who meet SARS-CoV-2 clinical and/or epidemiological  criteria. Test performance is unknown in asymptomatic patients. This  test is for in vitro diagnostic use under the FDA EUA for  laboratories certified under CLIA to perform high complexity testing.  This test has not been FDA cleared or approved. A negative result  does not rule out the presence of PCR inhibitors in the specimen or  target RNA in concentration below the limit of detection for the  assay. The possibility of a false negative should be considered if  the patient's recent exposure or clinical presentation suggests  COVID-19. This test was validated by the [facility]. This laboratory is certified under  the Clinical Laboratory  Improvement Amendments of 1988 (CLIA-88) as  qualified to perform high complexity laboratory testing.       If you have any questions or concerns, please call the clinic at the number listed above.       Sincerely,      Kash Solano MD           room air

## 2022-12-17 NOTE — LETTER
December 19, 2022      Janine Cornell  [address] [facility] [address]        Dear ,    We are writing to inform you of your test results.    Janine  Here is the result I just called you about  Since symptoms for nearly two weeks we won't use meds  Call if worse, but I think if you were going to get very ill from this it would have happened already    Resulted Orders   Influenza A/B antigen - Rapid Nasal Swab, Clinic Collect   Result Value Ref Range    Influenza A antigen Negative Negative    Influenza B antigen Negative Negative    Narrative    Test results must be correlated with clinical data. If necessary, results should be confirmed by a molecular assay or viral culture.   Symptomatic COVID-19 Virus (Coronavirus) by PCR Nose   Result Value Ref Range    SARS CoV2 PCR Positive (A) Negative      Comment:      POSITIVE: SARS-CoV-2 (COVID-19) RNA detected, presumed positive.    Narrative    Testing was performed using the Ensynima SARS-CoV-2 Assay on the  [employer] Instrument System. Additional information about this  Emergency Use Authorization (EUA) assay can be found via the Lab  Guide. This test should be ordered for the detection of SARS-CoV-2 in  individuals who meet SARS-CoV-2 clinical and/or epidemiological  criteria. Test performance is unknown in asymptomatic patients. This  test is for in vitro diagnostic use under the FDA EUA for  laboratories certified under CLIA to perform high complexity testing.  This test has not been FDA cleared or approved. A negative result  does not rule out the presence of PCR inhibitors in the specimen or  target RNA in concentration below the limit of detection for the  assay. The possibility of a false negative should be considered if  the patient's recent exposure or clinical presentation suggests  COVID-19. This test was validated by the [facility]. This laboratory is certified under  the Clinical Laboratory  Improvement Amendments of 1988 (CLIA-88) as  qualified to perform high complexity laboratory testing.       If you have any questions or concerns, please call the clinic at the number listed above.       Sincerely,      Kash Solano MD

## 2022-12-17 NOTE — NURSING NOTE
Chief Complaint   Patient presents with     Recheck Medication     Pt here to follow up from a month ago       Magali Bahena CMA, EMT at 10:54 AM on 12/17/2022.

## 2022-12-17 NOTE — PROGRESS NOTES
"  Assessment & Plan     Cough, unspecified type  Likely viral, swabs, conservative therapy, call if worse  - Influenza A/B antigen - Rapid Nasal Swab, Clinic Collect  - Symptomatic COVID-19 Virus (Coronavirus) by PCR Nasopharyngeal; Future  - Symptomatic COVID-19 Virus (Coronavirus) by PCR Nose    Type 2 diabetes, HbA1C goal < 8% (H)  Cont as is, monitor labs later in winter            25 minutes spent on the date of the encounter doing chart review, history and exam, documentation and further activities per the note    BMI:   Estimated body mass index is 31.09 kg/m  as calculated from the following:    Height as of 12/15/22: 1.575 m (5' 2\").    Weight as of this encounter: 77.1 kg (170 lb).     Return in about 1 month (around 1/17/2023).    Kash Solano MD  [facility] [place]    Adrienne Mehta is a 57 year old, presenting for the following health issues:  Recheck Medication (Pt here to follow up from a month ago)      HPI Here in f/u  About ten days uri symptoms, cough no fever sob, has ent congestion no pain  DM: last hgba1c better not at goal but much closer, on glargine and jardiance, not on januvia, pt prefers in person Endo visit so far none available till next spring  No acute c/o beyond URI sx  Past Medical History:   Diagnosis Date     Abnormal glandular Papanicolaou smear of cervix 1992     Allergic rhinitis     Allergy, airborne subst     Arthritis      ASCVD (arteriosclerotic cardiovascular disease)      Chronic pain      Chronic pancreatitis (H)     idiopathic, Tx: PPI, antioxidants, pancreatic enzymes     Common migraine      Coronary artery disease      Costochondritis      Difficulty in walking(719.7)      Dyspnea on exertion      Ectasia, mammary duct     [facility], persistent nipple discharge     Elevated fasting glucose      Gastro-oesophageal reflux disease      Granulomatosis with polyangiitis (H)      Hernia      History of angina      " Hyperlipidemia LDL goal < 100      Hypertension goal BP (blood pressure) < 140/90     Essential hypertension     Iron deficiency anemia      Ischemic cardiomyopathy      Menorrhagia      Migraine headaches      Mild persistent asthma      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 Matias, neg w/u, Rx'd w/prednisone     Ventral hernia, unspecified, without mention of obstruction or gangrene 2012     Past Surgical History:   Procedure Laterality Date     C/SECTION, LOW TRANSVERSE  1996         CARDIAC SURGERY      cardiac stent- recent in 16; 4 other stents     DILATION AND CURETTAGE N/A 2016    Procedure: DILATION AND CURETTAGE;  Surgeon: Nahed Bulter MD;  Location: [facility]     ESOPHAGOSCOPY, GASTROSCOPY, DUODENOSCOPY (EGD), COMBINED N/A 2022    Procedure: ESOPHAGOGASTRODUODENOSCOPY, WITH BIOPSY;  Surgeon: Enzo Sesay MD;  Location:  GI     HC UGI ENDOSCOPY W EUS  2008    Dr. Pastrana, possible chronic pancreatitis, fatty liver     HERNIA REPAIR  2012    done at [facility]     INSERT INTRAUTERINE DEVICE N/A 2016    Procedure: INSERT INTRAUTERINE DEVICE;  Surgeon: Nahed Butler MD;  Location: [facility] OR     INT UTERINE FIBRIOD EMBOLIZATION  10/29/2014     LAPAROSCOPIC CHOLECYSTECTOMY  2008    Dr. Ambrose      LEEP TX, CERVICAL  1992    s/p LEEP, done at [facility] in [place].     ORBITOTOMY Right 03/15/2016    Procedure: ORBITOTOMY;  Surgeon: Myron Cyr MD;  Location: [facility]     ORBITOTOMY Right 08/04/2017    Procedure: ORBITOTOMY;  RIGHT ORBITOTOMY AND BIOPSY;  Surgeon: Charis Holbrook MD;  Location: [facility]     REPAIR PTOSIS Right 11/17/2017    Procedure: REPAIR PTOSIS;  RIGHT UPPER LID PTOSIS REPAIR;  Surgeon: Myron Cyr MD;  Location: [facility]     UPPER GI ENDOSCOPY  10/21/2008    mild gastritis, Dr. Rocky [facility] ECHO HEART XTHORACIC,COMPLETE, W/O DOPPLER  02/04/2004    [facility] WNL, EF 60%     Current Outpatient Medications   Medication     acetaminophen (TYLENOL) 325 MG tablet     ADVAIR DISKUS 250-50 MCG/ACT inhaler     albuterol (2.5 MG/3ML) 0.083% neb solution     albuterol (PROAIR HFA, PROVENTIL HFA, VENTOLIN HFA) 108 (90 BASE) MCG/ACT inhaler     azelastine (ASTELIN) 0.1 % nasal spray     BANOPHEN 25 MG tablet     blood glucose (NO BRAND SPECIFIED) lancets standard     blood glucose (NO BRAND SPECIFIED) test strip     Blood Pressure Monitor KIT     calcium carbonate (TUMS) 500 MG chewable tablet     clopidogrel (PLAVIX) 75 MG tablet     COMPRESSION STOCKINGS     cyclobenzaprine (FLEXERIL) 10 MG tablet     dicyclomine (BENTYL) 20 MG tablet     diphenhydrAMINE (BENADRYL) 25 MG capsule     empagliflozin (JARDIANCE) 10 MG TABS tablet     EPIPEN 2-RIKY 0.3 MG/0.3ML injection     Ergocalciferol (VITAMIN D2) 50 MCG (2000 UT) TABS     esomeprazole (NEXIUM) 40 MG DR capsule     estradiol (VAGIFEM) 10 MCG TABS vaginal tablet     famotidine (PEPCID) 20 MG tablet     fexofenadine (ALLEGRA) 180 MG tablet     fluticasone (FLONASE) 50 MCG/ACT nasal spray     folic acid (FOLVITE) 1 MG tablet     insulin glargine (LANTUS PEN) 100 UNIT/ML pen     insulin pen needle (BD MARCK U/F) 32G X 4 MM miscellaneous     ketoconazole (NIZORAL) 2 % shampoo     levocetirizine (XYZAL) 5 MG tablet      levonorgestrel (MIRENA) 20 MCG/DAY IUD     lisinopril-hydrochlorothiazide (ZESTORETIC) 20-25 MG tablet     magnesium 250 MG tablet     metFORMIN (GLUCOPHAGE-XR) 500 MG 24 hr tablet     metoprolol succinate ER (TOPROL XL) 100 MG 24 hr tablet     Multiple Vitamin (DAILY-GERALD MULTIVITAMIN) TABS     nitroGLYcerin (NITROSTAT) 0.4 MG sublingual tablet     olopatadine (PATANOL) 0.1 % ophthalmic solution     pravastatin (PRAVACHOL) 40 MG tablet     prochlorperazine (COMPAZINE) 5 MG tablet     sennosides (SENOKOT) 8.6 MG tablet     spironolactone (ALDACTONE) 25 MG tablet     sucralfate (CARAFATE) 1 GM tablet     terbinafine (LAMISIL) 1 % external cream     terconazole (TERAZOL 3) 80 MG vaginal suppository     traMADol (ULTRAM) 50 MG tablet     vitamin B-2 (RIBOFLAVIN) 100 MG TABS tablet     vitamin D2 (ERGOCALCIFEROL) 30055 units (1250 mcg) capsule     No current facility-administered medications for this visit.     Allergies   Allergen Reactions     Amoxicillin-Pot Clavulanate      Artificial Sweetner (Informational Only)  Other (See Comments)     Increased headache     Augmentin      Unknown possible hives and edema     Azithromycin      Diatrizoate Other (See Comments)     Pt wants this listed because she is allergic to shellfish      Imitrex [Sumatriptan]      Severe face/neck/chest tightness and flushing side effects      Penicillins Hives     Unknown      Pork Allergy      Stomach pain, cramping, diarrhea, itching, nausea and headaches     Shellfish Allergy Hives and Swelling     Sulfa Drugs Hives and Swelling     Zithromax [Azithromycin Dihydrate] Swelling     Unknown              Review of Systems         Objective    /68 (BP Location: Right arm, Patient Position: Sitting, Cuff Size: Adult Large)   Pulse 74   Wt 77.1 kg (170 lb)   SpO2 99%   BMI 31.09 kg/m    Body mass index is 31.09 kg/m .  Physical Exam   GENERAL: healthy, alert and no distress  EYES: Eyes grossly normal to inspection, PERRL and  conjunctivae and sclerae normal  HENT: ear canals and TM's normal, nose and mouth without ulcers or lesions  NECK: no adenopathy, no asymmetry, masses, or scars and thyroid normal to palpation  RESP: lungs clear to auscultation - no rales, rhonchi or wheezes  CV: regular rate and rhythm, normal S1 S2, no S3 or S4, no murmur, click or rub, no peripheral edema and peripheral pulses strong  MS: no gross musculoskeletal defects noted, no edema

## 2022-12-18 ENCOUNTER — TELEPHONE (OUTPATIENT)
Dept: NURSING | Facility: CLINIC | Age: 57
End: 2022-12-18

## 2022-12-18 LAB — SARS-COV-2 RNA RESP QL NAA+PROBE: POSITIVE

## 2022-12-18 NOTE — TELEPHONE ENCOUNTER
Patient classified as COVID treatment eligible by Epic high risk algorithm:  Yes    Coronavirus (COVID-19) Notification    Reason for call  Notify of POSITIVE COVID-19 lab result, assess symptoms,  review [facility] recommendations    Lab Result   Lab test for 2019-nCoV rRt-PCR or SARS-COV-2 PCR  Oropharyngeal AND/OR nasopharyngeal swabs were POSITIVE for 2019-nCoV RNA [OR] SARS-COV-2 RNA (COVID-19) RNA     We have been unable to reach patient by phone at this time to notify of their Positive COVID-19 result.     [facility] for results.        A Positive COVID-19 letter will be sent via [employer] or the mail.    Shannan Pagan

## 2022-12-19 ENCOUNTER — TELEPHONE (OUTPATIENT)
Dept: RHEUMATOLOGY | Facility: CLINIC | Age: 57
End: 2022-12-19

## 2022-12-19 LAB
BKR LAB AP GYN ADEQUACY: NORMAL
BKR LAB AP GYN INTERPRETATION: NORMAL
BKR LAB AP HPV REFLEX: NORMAL
BKR LAB AP PREVIOUS ABNORMAL: NORMAL
PATH REPORT.COMMENTS IMP SPEC: NORMAL
PATH REPORT.COMMENTS IMP SPEC: NORMAL
PATH REPORT.RELEVANT HX SPEC: NORMAL

## 2022-12-19 NOTE — TELEPHONE ENCOUNTER
----- Message from Jaswant Brown sent at 12/19/2022  1:04 PM CST -----  Regarding: Rescheduling  Hello,    I called this patient to reschedule her appointment with Dr. Mckinnon on 2/16/23. She did not want to schedule an appointment further out but still wanted to be put on the waitlist to be scheduled sooner. She also requested that someone reach out to her about getting in sooner if that's possible at all.       Thank you,      Jaswant

## 2022-12-19 NOTE — TELEPHONE ENCOUNTER
I called and spoke with the patient about rescheduling her appt on 2/16/23 with Dr. Mckinnon. The next available appt is in June. Pt did not want to be scheduled out that far but still wants to be on the waitlist and contacted if something opens up sooner. Appt on 2/16 will be cancelled.

## 2022-12-20 ENCOUNTER — PATIENT OUTREACH (OUTPATIENT)
Dept: CARE COORDINATION | Facility: CLINIC | Age: 57
End: 2022-12-20

## 2022-12-20 NOTE — TELEPHONE ENCOUNTER
Majo Mckinnon MD Beard, Madeline, RN  Caller: Unspecified (Yesterday,  1:20 PM)  1/25 at 12 pm add on in person.

## 2022-12-20 NOTE — PROGRESS NOTES
Clinic Care Coordination Contact  [facility]/Voicemail     Clinical Data:  CC Outreach    Outreach attempted on 12/20/22; total outreach attempts x 1:    CC left message on Janine's voicemail with call back information and requested return call.    Additional Information:  Referral received for housing resources.     Status: Patient is on  CC panel, status as identified.     Plan: [facility] to continue outreach attempts.        SKY Rangel ([place])  , Care Coordination  [facility]  [facility]  [facility]  [email or web address]   Office: [phone]

## 2022-12-21 LAB
HUMAN PAPILLOMA VIRUS 16 DNA: NEGATIVE
HUMAN PAPILLOMA VIRUS 18 DNA: NEGATIVE
HUMAN PAPILLOMA VIRUS FINAL DIAGNOSIS: NORMAL
HUMAN PAPILLOMA VIRUS OTHER HR: NEGATIVE

## 2022-12-28 DIAGNOSIS — M35.9 UNDIFFERENTIATED CONNECTIVE TISSUE DISEASE (H): ICD-10-CM

## 2022-12-28 DIAGNOSIS — E11.9 TYPE 2 DIABETES, HBA1C GOAL < 7% (H): ICD-10-CM

## 2022-12-28 NOTE — TELEPHONE ENCOUNTER
traMADol (ULTRAM) 50 MG tablet 60 tablet 3 6/4/2022         Last Written Prescription Date:  6-4-2022  Last Fill Quantity: 60,   # refills: 3  Last Office Visit : 8-  Future Office visit:  1-    Routing refill request to provider for review/approval because:  CONTROLLED MEDICATION      Kathleen M Doege RN

## 2022-12-30 RX ORDER — TRAMADOL HYDROCHLORIDE 50 MG/1
TABLET ORAL
Qty: 60 TABLET | Refills: 3 | Status: SHIPPED | OUTPATIENT
Start: 2022-12-30 | End: 2023-12-07

## 2022-12-31 NOTE — TELEPHONE ENCOUNTER
Last Clinic Visit: 12/17/2022  [facility] [place]

## 2023-01-19 ENCOUNTER — LAB (OUTPATIENT)
Dept: LAB | Facility: CLINIC | Age: 58
End: 2023-01-19
Payer: COMMERCIAL

## 2023-01-19 ENCOUNTER — ANCILLARY PROCEDURE (OUTPATIENT)
Dept: MAMMOGRAPHY | Facility: CLINIC | Age: 58
End: 2023-01-19
Payer: COMMERCIAL

## 2023-01-19 ENCOUNTER — ANCILLARY PROCEDURE (OUTPATIENT)
Dept: GENERAL RADIOLOGY | Facility: CLINIC | Age: 58
End: 2023-01-19
Attending: FAMILY MEDICINE
Payer: COMMERCIAL

## 2023-01-19 ENCOUNTER — OFFICE VISIT (OUTPATIENT)
Dept: FAMILY MEDICINE | Facility: CLINIC | Age: 58
End: 2023-01-19
Payer: COMMERCIAL

## 2023-01-19 ENCOUNTER — ANCILLARY PROCEDURE (OUTPATIENT)
Dept: ULTRASOUND IMAGING | Facility: CLINIC | Age: 58
End: 2023-01-19
Attending: FAMILY MEDICINE
Payer: COMMERCIAL

## 2023-01-19 VITALS
WEIGHT: 169.9 LBS | TEMPERATURE: 98.2 F | HEART RATE: 81 BPM | BODY MASS INDEX: 31.27 KG/M2 | OXYGEN SATURATION: 98 % | SYSTOLIC BLOOD PRESSURE: 116 MMHG | HEIGHT: 62 IN | DIASTOLIC BLOOD PRESSURE: 80 MMHG

## 2023-01-19 DIAGNOSIS — R05.9 COUGH, UNSPECIFIED TYPE: ICD-10-CM

## 2023-01-19 DIAGNOSIS — K58.8 OTHER IRRITABLE BOWEL SYNDROME: ICD-10-CM

## 2023-01-19 DIAGNOSIS — E07.89 THYROID PAIN: ICD-10-CM

## 2023-01-19 DIAGNOSIS — E83.52 HYPERCALCEMIA: Primary | ICD-10-CM

## 2023-01-19 DIAGNOSIS — R50.9 FEVER, UNSPECIFIED FEVER CAUSE: ICD-10-CM

## 2023-01-19 DIAGNOSIS — E83.52 HYPERCALCEMIA: ICD-10-CM

## 2023-01-19 DIAGNOSIS — L65.9 HAIR LOSS: ICD-10-CM

## 2023-01-19 DIAGNOSIS — E11.9 TYPE 2 DIABETES, HBA1C GOAL < 7% (H): ICD-10-CM

## 2023-01-19 DIAGNOSIS — E55.9 VITAMIN D DEFICIENCY: ICD-10-CM

## 2023-01-19 DIAGNOSIS — B37.9 YEAST INFECTION: ICD-10-CM

## 2023-01-19 DIAGNOSIS — K26.9 DUODENAL ULCER WITHOUT HEMORRHAGE OR PERFORATION AND WITHOUT OBSTRUCTION: ICD-10-CM

## 2023-01-19 DIAGNOSIS — Z12.31 VISIT FOR SCREENING MAMMOGRAM: ICD-10-CM

## 2023-01-19 LAB
ALBUMIN SERPL BCG-MCNC: 4.9 G/DL (ref 3.5–5.2)
ALBUMIN UR-MCNC: NEGATIVE MG/DL
ALP SERPL-CCNC: 95 U/L (ref 35–104)
ALT SERPL W P-5'-P-CCNC: 26 U/L (ref 10–35)
ANION GAP SERPL CALCULATED.3IONS-SCNC: 14 MMOL/L (ref 7–15)
APPEARANCE UR: CLEAR
AST SERPL W P-5'-P-CCNC: 23 U/L (ref 10–35)
BASOPHILS # BLD AUTO: 0.1 10E3/UL (ref 0–0.2)
BASOPHILS NFR BLD AUTO: 0 %
BILIRUB SERPL-MCNC: 0.3 MG/DL
BILIRUB UR QL STRIP: NEGATIVE
BUN SERPL-MCNC: 23.6 MG/DL (ref 6–20)
CA-I BLD-MCNC: 4.9 MG/DL (ref 4.4–5.2)
CALCIUM SERPL-MCNC: 10.3 MG/DL (ref 8.6–10)
CHLORIDE SERPL-SCNC: 98 MMOL/L (ref 98–107)
COLOR UR AUTO: ABNORMAL
CREAT SERPL-MCNC: 1.09 MG/DL (ref 0.51–0.95)
DEPRECATED HCO3 PLAS-SCNC: 25 MMOL/L (ref 22–29)
EOSINOPHIL # BLD AUTO: 0.2 10E3/UL (ref 0–0.7)
EOSINOPHIL NFR BLD AUTO: 1 %
ERYTHROCYTE [DISTWIDTH] IN BLOOD BY AUTOMATED COUNT: 15.4 % (ref 10–15)
FERRITIN SERPL-MCNC: 70 NG/ML (ref 11–328)
GFR SERPL CREATININE-BSD FRML MDRD: 59 ML/MIN/1.73M2
GLUCOSE SERPL-MCNC: 232 MG/DL (ref 70–99)
GLUCOSE UR STRIP-MCNC: >=1000 MG/DL
HCT VFR BLD AUTO: 41.9 % (ref 35–47)
HGB BLD-MCNC: 13.4 G/DL (ref 11.7–15.7)
HGB UR QL STRIP: NEGATIVE
IMM GRANULOCYTES # BLD: 0.1 10E3/UL
IMM GRANULOCYTES NFR BLD: 0 %
IRON BINDING CAPACITY (ROCHE): 362 UG/DL (ref 240–430)
IRON SATN MFR SERPL: 14 % (ref 15–46)
IRON SERPL-MCNC: 51 UG/DL (ref 37–145)
KETONES UR STRIP-MCNC: 10 MG/DL
LEUKOCYTE ESTERASE UR QL STRIP: NEGATIVE
LYMPHOCYTES # BLD AUTO: 2.6 10E3/UL (ref 0.8–5.3)
LYMPHOCYTES NFR BLD AUTO: 16 %
MCH RBC QN AUTO: 24.9 PG (ref 26.5–33)
MCHC RBC AUTO-ENTMCNC: 32 G/DL (ref 31.5–36.5)
MCV RBC AUTO: 78 FL (ref 78–100)
MONOCYTES # BLD AUTO: 0.7 10E3/UL (ref 0–1.3)
MONOCYTES NFR BLD AUTO: 4 %
NEUTROPHILS # BLD AUTO: 12.6 10E3/UL (ref 1.6–8.3)
NEUTROPHILS NFR BLD AUTO: 79 %
NITRATE UR QL: NEGATIVE
NRBC # BLD AUTO: 0 10E3/UL
NRBC BLD AUTO-RTO: 0 /100
PH UR STRIP: 6 [PH] (ref 5–7)
PLATELET # BLD AUTO: 383 10E3/UL (ref 150–450)
POTASSIUM SERPL-SCNC: 4 MMOL/L (ref 3.4–5.3)
PROT SERPL-MCNC: 7.7 G/DL (ref 6.4–8.3)
PTH-INTACT SERPL-MCNC: 51 PG/ML (ref 15–65)
RBC # BLD AUTO: 5.39 10E6/UL (ref 3.8–5.2)
SODIUM SERPL-SCNC: 137 MMOL/L (ref 136–145)
SP GR UR STRIP: 1.03 (ref 1–1.03)
TSH SERPL DL<=0.005 MIU/L-ACNC: 0.4 UIU/ML (ref 0.3–4.2)
UROBILINOGEN UR STRIP-MCNC: NORMAL MG/DL
WBC # BLD AUTO: 16.1 10E3/UL (ref 4–11)

## 2023-01-19 PROCEDURE — 99215 OFFICE O/P EST HI 40 MIN: CPT | Performed by: FAMILY MEDICINE

## 2023-01-19 PROCEDURE — 80050 GENERAL HEALTH PANEL: CPT | Performed by: PATHOLOGY

## 2023-01-19 PROCEDURE — 83550 IRON BINDING TEST: CPT | Performed by: PATHOLOGY

## 2023-01-19 PROCEDURE — 81003 URINALYSIS AUTO W/O SCOPE: CPT | Performed by: PATHOLOGY

## 2023-01-19 PROCEDURE — 71046 X-RAY EXAM CHEST 2 VIEWS: CPT | Mod: GC | Performed by: RADIOLOGY

## 2023-01-19 PROCEDURE — 83970 ASSAY OF PARATHORMONE: CPT | Performed by: PATHOLOGY

## 2023-01-19 PROCEDURE — 82306 VITAMIN D 25 HYDROXY: CPT | Performed by: INTERNAL MEDICINE

## 2023-01-19 PROCEDURE — 82728 ASSAY OF FERRITIN: CPT | Performed by: FAMILY MEDICINE

## 2023-01-19 PROCEDURE — 82330 ASSAY OF CALCIUM: CPT | Performed by: PATHOLOGY

## 2023-01-19 PROCEDURE — 76536 US EXAM OF HEAD AND NECK: CPT | Mod: GC | Performed by: RADIOLOGY

## 2023-01-19 PROCEDURE — 83540 ASSAY OF IRON: CPT | Performed by: PATHOLOGY

## 2023-01-19 PROCEDURE — 77067 SCR MAMMO BI INCL CAD: CPT | Mod: GC | Performed by: STUDENT IN AN ORGANIZED HEALTH CARE EDUCATION/TRAINING PROGRAM

## 2023-01-19 PROCEDURE — 36415 COLL VENOUS BLD VENIPUNCTURE: CPT | Performed by: PATHOLOGY

## 2023-01-19 RX ORDER — SUCRALFATE 1 G/1
1 TABLET ORAL 4 TIMES DAILY
Qty: 120 TABLET | Refills: 3 | Status: SHIPPED | OUTPATIENT
Start: 2023-01-19 | End: 2023-06-21

## 2023-01-19 RX ORDER — LEVOFLOXACIN 500 MG/1
500 TABLET, FILM COATED ORAL DAILY
Qty: 7 TABLET | Refills: 0 | Status: SHIPPED | OUTPATIENT
Start: 2023-01-19 | End: 2023-04-25

## 2023-01-19 RX ORDER — FLUCONAZOLE 200 MG/1
200 TABLET ORAL DAILY
Qty: 7 TABLET | Refills: 0 | Status: SHIPPED | OUTPATIENT
Start: 2023-01-19 | End: 2023-04-25

## 2023-01-19 RX ORDER — LEVOFLOXACIN 500 MG/1
500 TABLET, FILM COATED ORAL DAILY
Qty: 7 TABLET | Refills: 0 | Status: SHIPPED | OUTPATIENT
Start: 2023-01-19 | End: 2023-01-19

## 2023-01-19 RX ORDER — DICYCLOMINE HCL 20 MG
TABLET ORAL
Qty: 60 TABLET | Refills: 0 | Status: SHIPPED | OUTPATIENT
Start: 2023-01-19 | End: 2023-06-21

## 2023-01-19 NOTE — NURSING NOTE
Janine Cornell is a 57 year old female patient that presents today in clinic for the following:    Chief Complaint   Patient presents with     Follow Up     Pt here to follow up on recent Covid infection; pt states burning in extremities     Generalized Body Aches     Cough     Fatigue     Headache     The patient's allergies and medications were reviewed as noted. A set of vitals were recorded as noted without incident. The patient does not have any other questions for the provider.    Michelle Hills, EMT at 2:03 PM on 1/19/2023

## 2023-01-20 NOTE — PROGRESS NOTES
"  Assessment & Plan     Other irritable bowel syndrome  Helps spasm pain, occasionally has. No blood or melena. Appetite mildly reduced but wt stable, occasional nausea timur am's.  - dicyclomine (BENTYL) 20 MG tablet; TAKE 1 TABLET(20 MG) BY MOUTH FOUR TIMES DAILY AS NEEDED.    Duodenal ulcer without hemorrhage or perforation and without obstruction  Helps nausea.  - sucralfate (CARAFATE) 1 GM tablet; Take 1 tablet (1 g) by mouth 4 times daily    Type 2 diabetes, HbA1c goal < 7% (H)  Cont as is, sees Endo soon  - insulin glargine (LANTUS PEN) 100 UNIT/ML pen; Inject 75 Units Subcutaneous every morning Or as directed.    Hypercalcemia  WOrk up further  - Comprehensive metabolic panel (BMP + Alb, Alk Phos, ALT, AST, Total. Bili, TP); Future  - Ionized Calcium; Future  - Parathyroid Hormone Intact; Future    Thyroid pain  Discussed  - TSH with free T4 reflex; Future  - US Thyroid; Future    Hair loss    - Iron and iron binding capacity; Future  - Ferritin; Future  - CBC with platelets and differential; Future    Cough, unspecified type    - XR Chest 2 Views; Future  - levofloxacin (LEVAQUIN) 500 MG tablet; Take 1 tablet (500 mg) by mouth daily    Fever, unspecified fever cause  Immune suppressed  - UA macro with reflex to Microscopic and Culture - Clinc Collect    Yeast infection  For atbx  - fluconazole (DIFLUCAN) 200 MG tablet; Take 1 tablet (200 mg) by mouth daily      42 minutes spent on the date of the encounter doing chart review, history and exam, documentation and further activities per the note    BMI:   Estimated body mass index is 31.08 kg/m  as calculated from the following:    Height as of this encounter: 1.575 m (5' 2\").    Weight as of this encounter: 77.1 kg (169 lb 14.4 oz).     No follow-ups on file.    Kash Solano MD  [facility] [place]    Adrienne Mehta is a 57 year old, presenting for the following health issues:  Follow Up (Pt here to follow up on recent " Covid infection; pt states burning in extremities), Generalized Body Aches, Cough, Fatigue, and Headache      HPI Here for a few things  One, recent covid, still fevers over 100, cough w/ clear phlegm, some SOB, some sinus congestion, no ST now but some ear pain off and on, very fatigued  Is immune suppressed, per pt on routine rituximab IV from [facility] (sees them next week)  Two, since covid notes burning sensation distal extremities, overall worse body myalgias  Three, elevated calcium noted, reviewed, further labs ordered  Four, she notes gets vag yeast if on atbx always  Five, scalp hair loss since covid  Six, DM, needs med refill  Seven, notes for abdomen pain carafate and bentyl help, tolerated, will continue    Past Medical History:   Diagnosis Date     Abnormal glandular Papanicolaou smear of cervix 1992     Allergic rhinitis     Allergy, airborne subst     Arthritis      ASCVD (arteriosclerotic cardiovascular disease)      Chronic pain      Chronic pancreatitis (H)     idiopathic, Tx: PPI, antioxidants, pancreatic enzymes     Common migraine      Coronary artery disease      Costochondritis      Difficulty in walking(719.7)      Dyspnea on exertion      Ectasia, mammary duct     [facility], persistent nipple discharge     Elevated fasting glucose      Gastro-oesophageal reflux disease      Granulomatosis with polyangiitis (H)      Hernia      History of angina      Hyperlipidemia LDL goal < 100      Hypertension goal BP (blood pressure) < 140/90     Essential hypertension     Iron deficiency anemia      Ischemic cardiomyopathy      Menorrhagia      Migraine headaches      Mild persistent asthma      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 Matias, neg w/u, Rx'd w/prednisone     Ventral hernia, unspecified, without mention of obstruction or gangrene 2012     Past Surgical History:   Procedure Laterality Date     C/SECTION, LOW TRANSVERSE  1996         CARDIAC SURGERY      cardiac stent- recent in 16; 4 other stents     DILATION AND CURETTAGE N/A 2016    Procedure: DILATION AND CURETTAGE;  Surgeon: Nahed Butler MD;  Location: [facility]     ESOPHAGOSCOPY, GASTROSCOPY, DUODENOSCOPY (EGD), COMBINED N/A 2022    Procedure: ESOPHAGOGASTRODUODENOSCOPY, WITH BIOPSY;  Surgeon: Enzo Sesay MD;  Location:  GI     [facility] UGI ENDOSCOPY W EUS  2008    Dr. Pastrana, possible chronic pancreatitis, fatty liver     HERNIA REPAIR  2012    done at [facility] [place]     INSERT INTRAUTERINE DEVICE N/A 2016    Procedure: INSERT INTRAUTERINE DEVICE;  Surgeon: Nahed Butler MD;  Location: [facility]     INT UTERINE FIBRIOD EMBOLIZATION  10/29/2014     LAPAROSCOPIC CHOLECYSTECTOMY  2008    Dr. Arnol GRUBBS TX, CERVICAL  1992    s/p HUMERA, done at [facility] in [place].     ORBITOTOMY Right 03/15/2016    Procedure: ORBITOTOMY;  Surgeon: Myron Cyr MD;  Location: [facility]     ORBITOTOMY Right 2017    Procedure: ORBITOTOMY;  RIGHT ORBITOTOMY AND BIOPSY;  Surgeon: Charis Holbrook MD;  Location: [facility]     REPAIR PTOSIS Right 2017    Procedure: REPAIR PTOSIS;  RIGHT UPPER LID PTOSIS REPAIR;  Surgeon: Myron Cyr MD;  Location: [facility]     UPPER GI ENDOSCOPY  10/21/2008    mild gastritis,  Dr. Hidalgo     [facility] ECHO HEART XTHORACIC,COMPLETE, W/O DOPPLER  02/04/2004    [facility] WNL, EF 60%     Current Outpatient Medications   Medication     acetaminophen (TYLENOL) 325 MG tablet     ADVAIR DISKUS 250-50 MCG/ACT inhaler     albuterol (2.5 MG/3ML) 0.083% neb solution     albuterol (PROAIR HFA, PROVENTIL HFA, VENTOLIN HFA) 108 (90 BASE) MCG/ACT inhaler     BANOPHEN 25 MG tablet     blood glucose (NO BRAND SPECIFIED) lancets standard     blood glucose (NO BRAND SPECIFIED) test strip     Blood Pressure Monitor KIT     calcium carbonate (TUMS) 500 MG chewable tablet     clopidogrel (PLAVIX) 75 MG tablet     COMPRESSION STOCKINGS     cyclobenzaprine (FLEXERIL) 10 MG tablet     dicyclomine (BENTYL) 20 MG tablet     diphenhydrAMINE (BENADRYL) 25 MG capsule     empagliflozin (JARDIANCE) 10 MG TABS tablet     EPIPEN 2-RIKY 0.3 MG/0.3ML injection     esomeprazole (NEXIUM) 40 MG DR capsule     estradiol (VAGIFEM) 10 MCG TABS vaginal tablet     famotidine (PEPCID) 20 MG tablet     fexofenadine (ALLEGRA) 180 MG tablet     fluconazole (DIFLUCAN) 200 MG tablet     fluticasone (FLONASE) 50 MCG/ACT nasal spray     folic acid (FOLVITE) 1 MG tablet     insulin glargine (LANTUS PEN) 100 UNIT/ML pen     insulin pen needle (BD MARCK U/F) 32G X 4 MM miscellaneous     ketoconazole (NIZORAL) 2 % shampoo     levocetirizine (XYZAL) 5 MG tablet     levofloxacin (LEVAQUIN) 500 MG tablet     levonorgestrel (MIRENA) 20 MCG/DAY IUD     lisinopril-hydrochlorothiazide (ZESTORETIC) 20-25 MG tablet     magnesium 250 MG tablet     metFORMIN (GLUCOPHAGE-XR) 500 MG 24 hr tablet     metoprolol succinate ER (TOPROL XL) 100 MG 24 hr tablet     Multiple Vitamin (DAILY-GERALD MULTIVITAMIN) TABS     nitroGLYcerin (NITROSTAT) 0.4 MG sublingual tablet     olopatadine (PATANOL) 0.1 % ophthalmic solution     pravastatin (PRAVACHOL) 40 MG tablet     prochlorperazine (COMPAZINE) 5 MG tablet     sennosides (SENOKOT) 8.6 MG tablet     spironolactone  "(ALDACTONE) 25 MG tablet     sucralfate (CARAFATE) 1 GM tablet     terbinafine (LAMISIL) 1 % external cream     traMADol (ULTRAM) 50 MG tablet     vitamin D2 (ERGOCALCIFEROL) 68789 units (1250 mcg) capsule     azelastine (ASTELIN) 0.1 % nasal spray     Ergocalciferol (VITAMIN D2) 50 MCG (2000 UT) TABS     terconazole (TERAZOL 3) 80 MG vaginal suppository     vitamin B-2 (RIBOFLAVIN) 100 MG TABS tablet     No current facility-administered medications for this visit.     Allergies   Allergen Reactions     Amoxicillin-Pot Clavulanate      Artificial Sweetner (Informational Only)  Other (See Comments)     Increased headache     Augmentin      Unknown possible hives and edema     Azithromycin      Diatrizoate Other (See Comments)     Pt wants this listed because she is allergic to shellfish      Imitrex [Sumatriptan]      Severe face/neck/chest tightness and flushing side effects      Penicillins Hives     Unknown      Pork Allergy      Stomach pain, cramping, diarrhea, itching, nausea and headaches     Shellfish Allergy Hives and Swelling     Sulfa Drugs Hives and Swelling     Zithromax [Azithromycin Dihydrate] Swelling     Unknown      No obvious UTI sx         Review of Systems         Objective    /80 (BP Location: Right arm, Patient Position: Sitting, Cuff Size: Adult Large)   Pulse 81   Temp 98.2  F (36.8  C) (Oral)   Ht 1.575 m (5' 2\")   Wt 77.1 kg (169 lb 14.4 oz)   SpO2 98%   BMI 31.08 kg/m    Body mass index is 31.08 kg/m .  Physical Exam   GENERAL: healthy, alert and no distress  EYES: Eyes grossly normal to inspection, PERRL and conjunctivae and sclerae normal  HENT: ear canals and TM's normal, nose and mouth without ulcers or lesions  NECK: no adenopathy, no asymmetry, masses, or scars and thyroid mildly enlarged and tender  RESP: lungs clear to auscultation - no rales, rhonchi or wheezes  CV: regular rate and rhythm, normal S1 S2, no S3 or S4, no murmur, click or rub, no peripheral edema and " peripheral pulses strong  ABDOMEN: soft, nontender, no hepatosplenomegaly, no masses and bowel sounds normal  MS: no gross musculoskeletal defects noted, no edema

## 2023-01-24 ENCOUNTER — TELEPHONE (OUTPATIENT)
Dept: RHEUMATOLOGY | Facility: CLINIC | Age: 58
End: 2023-01-24
Payer: COMMERCIAL

## 2023-01-24 NOTE — TELEPHONE ENCOUNTER
Message left for patient that Dr. Mckinnon is fine with a phone visit tomorrow and she is sorry you are not feelign well. Will update appointment.    ISHA PhillipsN, RN  RN Care Coordinator Rheumatology

## 2023-01-24 NOTE — TELEPHONE ENCOUNTER
Pt called into clinic.  She continues to not feel well from her covid infection in December. She is wondering if she can switch to a phone visit for tomorrow?    Will discuss with Dr. Mckinnon, she is ok if we leave a message letting her know for sure after Dr. Mckinnon says yes or no.    Desiree Godinez RN  Rheumatology Clinic

## 2023-01-24 NOTE — TELEPHONE ENCOUNTER
Majo Mckinnon MD Beard, Madeline, RN  Cc: Krissy Taylor RN  Caller: Unspecified (Today, 10:03 AM)  Ok to switch to phone visit tomorrow, I am sorry she is not feeling well. Thanks               Left message letting pt know that we switched her to telephone visit with Dr. Mckinnon.    Desiree Godinez RN  Rheumatology Clinic

## 2023-01-25 ENCOUNTER — TELEPHONE (OUTPATIENT)
Dept: RHEUMATOLOGY | Facility: CLINIC | Age: 58
End: 2023-01-25

## 2023-01-25 ENCOUNTER — VIRTUAL VISIT (OUTPATIENT)
Dept: RHEUMATOLOGY | Facility: CLINIC | Age: 58
End: 2023-01-25
Attending: INTERNAL MEDICINE
Payer: COMMERCIAL

## 2023-01-25 DIAGNOSIS — M54.2 NECK PAIN: ICD-10-CM

## 2023-01-25 DIAGNOSIS — I77.82 ANCA-ASSOCIATED VASCULITIS (H): Primary | ICD-10-CM

## 2023-01-25 DIAGNOSIS — M17.10 ARTHRITIS OF KNEE: ICD-10-CM

## 2023-01-25 DIAGNOSIS — E55.9 VITAMIN D DEFICIENCY: ICD-10-CM

## 2023-01-25 LAB — DEPRECATED CALCIDIOL+CALCIFEROL SERPL-MC: 44 UG/L (ref 20–75)

## 2023-01-25 PROCEDURE — 99442 PR PHYSICIAN TELEPHONE EVALUATION 11-20 MIN: CPT | Mod: 93 | Performed by: INTERNAL MEDICINE

## 2023-01-25 NOTE — PROGRESS NOTES
Janine is a 57 year old who is being evaluated via a billable telephone visit.      What phone number would you like to be contacted at? [phone]  How would you like to obtain your AVS? Mail a copy    [facility] Location (provider location):  On-site  Phone call duration: 17 minutes     Rheumatology F/U Virtual Visit Note    Last visit note:08/19/2022     Today's visit date: 1/25/2023    Reason for in person visit: F/U GPA    HPI     Ms. 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 She is on HCQ since 5/2014. On rituximab since 12/20218 with great response. Previously tried and did not tolerate MTX, AZA.    She had lateral orbitotomy and debulking orbital mass right eye on 9/26/18 with Dr. Shah and Dr. Valdez at [facility]. 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 There were no complications according to the op note.    Today 1/25/2023: Janine is doing a phone visit for follow up, was feeling ill and switched in person to phone visit. She got sick around Thanksgiving, saw her PCP on 12/17, was tested positive for COVID, it took a longtime to feel better, did not take paxlovid as it was already past 2 weeks. Since then, she has not been feeling well. Has headaches, body ache, her eyes especially R eye look pink, they burn and itch a lot. Has sense of smell but can not taste her food. She is very tired, hardly leaves her house. Last time, left house for doctor visit, was tired and sore. Has tingling and pinprick feeling over arms and legs. Has upcoming follow up with PCP on 2/10. She had thyroid US for thyroid nodules. She had abnormal screening mammogram on 1/9, will go back for diagnostic mammogram and US of L breast on 1/31.    08/19/2022  Reports fatigue and headaches. Headache worsens after taking Zofran for nausea. Joint symptoms come and go. Last rituximab on 4/6/22. Rituximab is helpful but  feels like it wears off prior to the 6 months. Next appointment scheduled for October 2022. Develops intermittent vaginal yeast infection and thrush related to rising blood glucose. Right eye without symptoms. No new fevers, chills, skin changes. Son is wondering if she takes herbal supplements for headaches will this interact with any of her medications. Scheduled to meet with pharmacist today.        4/22/2022:   Each rituximab infusion causes vaginal yeast infection and thrush related to rise in BG. Her BG has stayed in higher level since last rituximab. Has more ache and pain, myalgia and arthralgia. Recently has had headaches with high BP. Had last rituximab 1000 mg on 4/6.Her R eye is itching and swelling up.  Today, her BG is 177.Has gained 20 lbs since Feb 2022. Had severe pain in her arm after covid vaccine, it took a month to get better.      12/16/2021: Janine presents for follow up. Reports ESOB with cold, has ongoing joint pain. R eye pain is intermittent.  Reports headaches after rituximab 1 gram on 10/18/2021.  Last week, her R knee was hurting.      8/20/2021: Janine has pain over arms, knees. Some days, feel stiff all day long. Some days can't do stairs. Hard to tell if there is swelling as has more water retention during summer.  Some days, eye swells up like today. No fevers, SOB, CP or cough.  Has rosacea but some days wakes up with heat rash over sun. Her face is peeling.  Sometimes can't lift things or grab things with pain from elbow to hands. Has shoulder and neck pain but this forearm pain is new.  Stopped HCQ in mid July due to concern for potential HCQ toxicity on OCT, her eye exam with Dr. Vernon has been re-scheduled and upcoming on 9/14 to address HCQ toxicity, pain is not worse off HCQ.  Not COVID vaccinated but is cautious.      5/10/2021: Joint ache, knees hurt, R elbow hurts, R arm hurts, R hand hurts. Has lots of swelling in her joints especially hands.    Depending on weather, joints  jhurt, sometimes pain is 7/10.  Has problem with taking stairs and balance.  Gets off balance.   R eye gets tinglin, swelling.  Gets out of breath, gets worse with seasonal allergies.  Over past month and half, took nitro more, like 8 times.  No hemooptysis, no hematuria.  Has allergies.      4/21/2021: Had last rituximab 1 gram on 1/19, 2/2/2021. Reports rituximab starts to wear off earlier, has more sx this time. Eye swelling is intermittent. Gets indigestion/ GERD sx with constipation after the infusion.  She reports having asthma, swelling of neck, arms. She thinks that it could be from her vasculitis. She is worried about going down on rituximab dose.   Otherwise unchanged sx, no SOB or hemoptysis or persistent cough, or   Somedays her knees and hips hurt a lot, feel like bone on bone in her knees, especially on changing position.      Component      Latest Ref Rng 2/28/2013 2/28/2013          12:14 PM 12:14 PM   WBC      4.0 - 11.0 10e9/L     RBC Count      3.8 - 5.2 10e12/L     Hemoglobin      11.7 - 15.7 g/dL     Hematocrit      35.0 - 47.0 %     MCV      78 - 100 fl     MCH      26.5 - 33.0 pg     MCHC      31.5 - 36.5 g/dL     RDW      10.0 - 15.0 %     Platelet Count      150 - 450 10e9/L     Specimen Description           Lyme Screen IgG and IgM           Vitamin D Deficiency screening      30 - 75 ug/L     Ferritin      10 - 300 ng/mL     Sed Rate      0 - 20 mm/h     ALLI Screen by EIA      <1.0     Rheumatoid Factor      0 - 14 IU/mL     CK Total      30 - 225 U/L  78   Uric Acid      2.5 - 7.5 mg/dL 6.7      Component      Latest Ref Rng 2/28/2013          12:14 PM   WBC      4.0 - 11.0 10e9/L    RBC Count      3.8 - 5.2 10e12/L    Hemoglobin      11.7 - 15.7 g/dL    Hematocrit      35.0 - 47.0 %    MCV      78 - 100 fl    MCH      26.5 - 33.0 pg    MCHC      31.5 - 36.5 g/dL    RDW      10.0 - 15.0 %    Platelet Count      150 - 450 10e9/L    Specimen Description       Serum   Lyme Screen IgG and  IgM       Test value: <0.75....Interpretation: Negative....If you highly suspect Lyme . . .   Vitamin D Deficiency screening      30 - 75 ug/L    Ferritin      10 - 300 ng/mL    Sed Rate      0 - 20 mm/h    ALLI Screen by EIA      <1.0    Rheumatoid Factor      0 - 14 IU/mL    CK Total      30 - 225 U/L    Uric Acid      2.5 - 7.5 mg/dL      Component      Latest Ref Rng 2/28/2013 2/28/2013 2/28/2013 2/28/2013          12:14 PM 12:14 PM 12:14 PM 12:14 PM   WBC      4.0 - 11.0 10e9/L       RBC Count      3.8 - 5.2 10e12/L       Hemoglobin      11.7 - 15.7 g/dL       Hematocrit      35.0 - 47.0 %       MCV      78 - 100 fl       MCH      26.5 - 33.0 pg       MCHC      31.5 - 36.5 g/dL       RDW      10.0 - 15.0 %       Platelet Count      150 - 450 10e9/L       Specimen Description             Lyme Screen IgG and IgM             Vitamin D Deficiency screening      30 - 75 ug/L       Ferritin      10 - 300 ng/mL    10   Sed Rate      0 - 20 mm/h   23 (H)    ALLI Screen by EIA      <1.0  <1.0 . . .     Rheumatoid Factor      0 - 14 IU/mL 26 (H)      CK Total      30 - 225 U/L       Uric Acid      2.5 - 7.5 mg/dL         5/2013  CBC WITH PLATELETS DIFFERENTIAL       Component Value Range    WBC 11.3 (*) 4.0 - 11.0 10e9/L    RBC Count 4.56  3.8 - 5.2 10e12/L    Hemoglobin 11.1 (*) 11.7 - 15.7 g/dL    Hematocrit 34.3 (*) 35.0 - 47.0 %    MCV 75 (*) 78 - 100 fl    MCH 24.3 (*) 26.5 - 33.0 pg    MCHC 32.4  31.5 - 36.5 g/dL    RDW 16.1 (*) 10.0 - 15.0 %    Platelet Count 315  150 - 450 10e9/L    Diff Method Automated Method      % Neutrophils 71.6  40 - 75 %    % Lymphocytes 20.9  20 - 48 %    % Monocytes 4.3  0 - 12 %    % Eosinophils 2.8  0 - 6 %    % Basophils 0.2  0 - 2 %    % Immature Granulocytes 0.2  0 - 0.4 %    Absolute Neutrophil 8.1  1.6 - 8.3 10e9/L    Absolute Lymphocytes 2.4  0.8 - 5.3 10e9/L    Absolute Monoctyes 0.5  0.0 - 1.3 10e9/L    Absolute Eosinophils 0.3  0.0 - 0.7 10e9/L    Absolute Basophils 0.0  0.0 -  0.2 10e9/L    Abs Immature Granulocytes 0.0  0 - 0.03 10e9/L   AMYLASE       Component Value Range    Amylase 103  30 - 110 U/L   COMPREHENSIVE METABOLIC PANEL       Component Value Range    Sodium 144  133 - 144 mmol/L    Potassium 3.8  3.4 - 5.3 mmol/L    Chloride 105  94 - 109 mmol/L    Carbon Dioxide 23  20 - 32 mmol/L    Anion Gap 17  6 - 17 mmol/L    Glucose 173 (*) 60 - 99 mg/dL    Urea Nitrogen 13  5 - 24 mg/dL    Creatinine 0.83  0.52 - 1.04 mg/dL    GFR Estimate 74  >60 mL/min/1.7m2    GFR Estimate If Black 90  >60 mL/min/1.7m2    Calcium 9.7  8.5 - 10.4 mg/dL    Bilirubin Total 0.4  0.2 - 1.3 mg/dL    Albumin 4.3  3.9 - 5.1 g/dL    Protein Total 7.8  6.8 - 8.8 g/dL    Alkaline Phosphatase 66  40 - 150 U/L    ALT 36  0 - 50 U/L    AST 28  0 - 45 U/L   CK TOTAL       Component Value Range    CK Total 66  30 - 225 U/L   CRP INFLAMMATION       Component Value Range    CRP Inflammation 10.4 (*) 0.0 - 8.0 mg/L   LIPASE       Component Value Range    Lipase 353 (*) 20 - 250 U/L   ERYTHROCYTE SEDIMENTATION RATE AUTO       Component Value Range    Sed Rate 26 (*) 0 - 20 mm/h   ROUTINE UA WITH MICROSCOPIC REFLEX TO CULTURE       Component Value Range    Color Urine Yellow      Appearance Urine Slightly Cloudy      Glucose Urine 30 (*) NEG mg/dL    Bilirubin Urine Negative  NEG    Ketones Urine 5 (*) NEG mg/dL    Specific Gravity Urine 1.026  1.003 - 1.035    Blood Urine Trace (*) NEG    pH Urine 5.0  5.0 - 7.0 pH    Protein Albumin Urine 10 (*) NEG mg/dL    Urobilinogen mg/dL Normal  0.0 - 2.0 mg/dL    Nitrite Urine Negative  NEG    Leukocyte Esterase Urine Negative  NEG    Source Midstream Urine      WBC Urine 1  0 - 2 /HPF    RBC Urine 4 (*) 0 - 2 /HPF    Squamous Epithelial /HPF Urine <1  0 - 1 /HPF    Mucous Urine Present (*) NEG /LPF    Hyaline Casts 3 (*) 0 - 2 /LPF    Calcium Oxalate Moderate (*) NEG /HPF   COMPLEMENT C3       Component Value Range    Complement C3 143  76 - 169 mg/dL   COMPLEMENT C4        Component Value Range    Complement C4 31  15 - 50 mg/dL   HEPATITIS C ANTIBODY       Component Value Range    Hepatitis C Antibody Negative  NEG   CARDIOLIPIN ANTIBODY IGG AND IGM       Component Value Range    Cardiolipin IgG Marline    0 - 15.0 GPL    Value: <15.0      Interpretation:  Negative    Cardiolipin IgM Marline    0 - 12.5 MPL    Value: <12.5      Interpretation:  Negative   LUPUS PANEL       Component Value Range    Lupus Result    NEG    Value: Negative      (Note)      COMMENTS:      The INR is normal.      APTT is normal.  1:2 Mix is not indicated.      DRVVT Screen is normal.      Thrombin time is normal.      NEGATIVE TEST; A LUPUS ANTICOAGULANT WAS NOT DETECTED IN THIS      SPECIMEN WITHIN THE LIMITS OF THE TESTING REPERTOIRE.      If the clinical picture is strongly suggestive of an antiphospholipid      syndrome, recommend anticardiolipin and beta-2-glycoprotein (IgG and      IgM) antibody tests.      Leela Franks M.D.  [phone]      5/2/2013            INR =  0.93 N = 0.86-1.14  (No additional charge)      TT = 15.7 N = 13.0-19.0 sec  (No additional charge)            APTT'S:    Seconds      Reagent =  Stago LA      Normal  =  38      Patient  =  34      1:2 Mix  =  N/A      Reference =  31-43             DILUTE MADELINE VIPER VENOM TEST:      DRVVT Screen Ratio = 0.76 Normal = <1.21         IMMUNOGLOBULIN G SUBCLASSES       Component Value Range    IGG 1030  695 - 1620 mg/dL    IgG1 488  300 - 856 mg/dL    IgG2 325  158 - 761 mg/dL    IgG3 47  24 - 192 mg/dL    IgG4 18  11 - 86 mg/dL   SUNNY ANTIBODY PANEL       Component Value Range    Ribonucleic Protein IgG Antibody 0      Smith Antibody IgG 1      SSA (RO) Antibody IgG 4      SSB (LA) Antibody IgG 0      Scleroderma Antibody IgG 0     BETA 2 GLYCOPROTEIN ANTIBODIES IGG IGM       Component Value Range    Beta-2-Glycopro IgG 1      Beta-2-Glycopro IgM 5     CYCLIC CIT PEPT IGG       Component Value Range    Cyclic Cit Pept IgG/IgA    <20  UNITS    Value: <20      Interpretation:  Negative   DNA DOUBLE STRANDED ANTIBODIES       Component Value Range    DNA-ds    0 - 29 IU/mL    Value: <15      Interpretation:  Negative       CT CHEST PULMONARY EMBOLISM W CONTRAST 5/20/2015 4:57 PM  HISTORY: Pain. SOB. Elevated d-dimer.   TECHNIQUE: 65 mL Isovue 370. Axial images with coronal  reconstructions.  COMPARISON: None.  FINDINGS: Calcified granuloma with surrounding scarring in the  posterolateral left upper lobe. Triangular shaped opacity at the right  lung base in the lateral right middle lobe could represent some  scarring, atelectasis or infiltrate. There is also some scarring or  atelectasis in the posteromedial right lung base. Lungs otherwise  clear.  The pulmonary arteries are well opacified. No CT evidence for acute  pulmonary embolus. No aortic dissection.  Diffuse fatty infiltration of the liver.  IMPRESSION  IMPRESSION:   1. No pulmonary embolus identified.  2. Small focus of atelectasis, infiltrate or scarring in the lateral  right middle lobe.  3. Old granulomatous disease.  4. Otherwise negative.  SILVERIO VAZQUEZ MD    Copath Report      Patient Name: FAVIO MARTINEZ   MR#: [id number]   Specimen #: [id number]   Collected: 3/15/2016   Received: 3/15/2016   Reported: 3/17/2016 13:33   Ordering Phy(s): PARVEEN ENRIQUEZ     ORIGINAL REPORT FOLLOWS ADDENDUM  ADDENDUM     TO ORIGINAL REPORT   Status: Signed Out   Date Ordered:3/18/2016   Date Complete:3/18/2016   Date Reported:3/18/2016 12:06   Signed Out By: Marianne Godfrey MD     INTERPRETATION:   This addendum is done to incorporate the results of fungal (GMS) stains   done on the specimen.  Specimen is negative for fungal organisms.  The   original final diagnosis remains unchanged.     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  Negative for malignancy(Please   see microscopic description)     Electronically signed out by:     Marianne Godfrey MD     CLINICAL HISTORY:   right orbital mass     GROSS:   The specimen is received labeled \"right orbital biopsy\" and consists of   red-tan nodule measuring 1.5 x 0.9 x 0.6 cm with one smooth side and   opposite rough side consistent with periosteum.  The specimen is   bisected revealing homogenous pale tan fleshy cut surface.  Touch   preparations are prepared, air dried and fixed, portion of specimen is   submitted in RPMI for possible lymphoma workup [facility],   ([facility], [place], [place] ).  The remainder is entirely submitted.   (Dictated by: Marianne Godfrey MD 3/15/2016 04:45 PM)     MICROSCOPIC:     Specimen consists of portion of lacrimal gland with acute and chronic   inflammation.  Focal area of microabscess formation associated with   small areas of necrosis are also present.  Inflammation is seen   extending to the periorbital adipose tissue forming panniculitis.   Specimen is negative for malignancy.  Samples sent for immunophenotyping   to [facility], ([facility] [employer] [facility], [place], [place] ) reveals no evidence   of monoclonality or aberrant antigen expression.  A GMS (fungal) stain   is pending and results will be reported as an addendum.     CPT Codes:   A: 51234-FP8, 48047-QIFDW, SOH, 00917-MHJOI, 59107-BIML     TESTING LAB LOCATION:   [address]  [id number] [phone]     COLLECTION SITE:   [facility]   [facility]            Component      Latest Ref Rng 4/29/2016   Nucleated RBCs      0 /100 0   Absolute Neutrophil      1.6 - 8.3 10e9/L 8.9 (H)   Absolute Lymphocytes      0.8 - 5.3 10e9/L 3.2   Absolute Monocytes      0.0 - 1.3 10e9/L 0.8   Absolute Eosinophils      0.0 - 0.7 10e9/L 0.2   Absolute Basophils      0.0 - 0.2 " 10e9/L 0.1   Abs Immature Granulocytes      0 - 0.4 10e9/L 0.1   Absolute Nucleated RBC       0.0   IGG      695 - 1620 mg/dL 836   IgG1      300 - 856 mg/dL 280 (L)   IgG2      158 - 761 mg/dL 277   IgG3      24 - 192 mg/dL 35   IgG4      11 - 86 mg/dL 16   RNP Antibody IgG      0.0 - 0.9 AI <0.2 . . .   Smith SUNNY Antibody IgG      0.0 - 0.9 AI <0.2 . . .   SSA (Ro) (SUNNY) Antibody, IgG      0.0 - 0.9 AI <0.2 . . .   SSB (La) (SUNNY) Antibody, IgG      0.0 - 0.9 AI <0.2 . . .   Scleroderma Antibody Scl-70 SUNNY IgG      0.0 - 0.9 AI <0.2 . . .   Cholesterol      <200 mg/dL 154   Triglycerides      <150 mg/dL 220 (H)   HDL Cholesterol      >49 mg/dL 42 (L)   LDL Cholesterol Calculated      <100 mg/dL 67   Non HDL Cholesterol      <130 mg/dL 111   M Tuberculosis Result      NEG Negative   M Tuberculosis Antigen Value       0.00   Albumin      3.4 - 5.0 g/dL 4.3   ALT      0 - 50 U/L 30   AST      0 - 45 U/L 10   Complement C3      76 - 169 mg/dL 157   Complement C4      15 - 50 mg/dL 32   CRP Inflammation      0.0 - 8.0 mg/L <2.9   Sed Rate      0 - 20 mm/h 2   DNA-ds      <10 IU/mL 1   Cyclic Citrullinated Peptide Antibody, IgG      <7 U/mL 1   Rheumatoid Factor      <20 IU/mL <20   Proteinase 3 Antibody IgG      0.0 - 0.9 AI <0.2 . . .   Myeloperoxidase Antibody IgG      0.0 - 0.9 AI 2.5 (H)   Vitamin D Deficiency screening      20 - 75 ug/L 24   Hemoglobin A1C      4.3 - 6.0 % 7.9 (H)   ALLI by IFA IgG       1:40 (A) . . .     Component      Latest Ref Rng & Units 1/16/2021   WBC      4.0 - 11.0 10e9/L    RBC Count      3.8 - 5.2 10e12/L    Hemoglobin      11.7 - 15.7 g/dL    Hematocrit      35.0 - 47.0 %    MCV      78 - 100 fl    MCH      26.5 - 33.0 pg    MCHC      31.5 - 36.5 g/dL    RDW      10.0 - 15.0 %    Platelet Count      150 - 450 10e9/L    Diff Method          % Neutrophils      %    % Lymphocytes      %    % Monocytes      %    % Eosinophils      %    % Basophils      %    % Immature Granulocytes      %     Nucleated RBCs      0 /100    Absolute Neutrophil      1.6 - 8.3 10e9/L    Absolute Lymphocytes      0.8 - 5.3 10e9/L    Absolute Monocytes      0.0 - 1.3 10e9/L    Absolute Eosinophils      0.0 - 0.7 10e9/L    Absolute Basophils      0.0 - 0.2 10e9/L    Abs Immature Granulocytes      0 - 0.4 10e9/L    Absolute Nucleated RBC          IGG      610 - 1,616 mg/dL    IGA      84 - 499 mg/dL    IGM      35 - 242 mg/dL    Creatinine      0.52 - 1.04 mg/dL    GFR Estimate      >60 mL/min/1.73:m2    GFR Estimate If Black      >60 mL/min/1.73:m2    COVID-19 Virus PCR to [facility] - Source       Nasopharyngeal   COVID-19 Virus PCR to [facility] - Result       Not Detected   Neutrophil Cytoplasmic Antibody      <1:10 titer    Neutrophil Cytoplasmic Antibody Pattern          CD19 B Cells      6 - 27 %    Absolute CD19      107 - 698 cells/uL    Myeloperoxidase Antibody IgG      0.0 - 0.9 AI    Proteinase 3 Antibody IgG      0.0 - 0.9 AI    Vitamin D Deficiency screening      20 - 75 ug/L    Sed Rate      0 - 30 mm/h    CRP Inflammation      0.0 - 8.0 mg/L    Albumin      3.4 - 5.0 g/dL    ALT      0 - 50 U/L    AST      0 - 45 U/L      Component      Latest Ref Rng & Units 5/7/2021   WBC      4.0 - 11.0 10e9/L 8.9   RBC Count      3.8 - 5.2 10e12/L 4.89   Hemoglobin      11.7 - 15.7 g/dL 12.7   Hematocrit      35.0 - 47.0 % 39.7   MCV      78 - 100 fl 81   MCH      26.5 - 33.0 pg 26.0 (L)   MCHC      31.5 - 36.5 g/dL 32.0   RDW      10.0 - 15.0 % 13.7   Platelet Count      150 - 450 10e9/L 336   Diff Method       Automated Method   % Neutrophils      % 65.3   % Lymphocytes      % 20.7   % Monocytes      % 7.8   % Eosinophils      % 5.3   % Basophils      % 0.7   % Immature Granulocytes      % 0.2   Nucleated RBCs      0 /100 0   Absolute Neutrophil      1.6 - 8.3 10e9/L 5.8   Absolute Lymphocytes      0.8 - 5.3 10e9/L 1.8   Absolute Monocytes      0.0 - 1.3 10e9/L 0.7   Absolute Eosinophils      0.0 - 0.7 10e9/L 0.5   Absolute  Basophils      0.0 - 0.2 10e9/L 0.1   Abs Immature Granulocytes      0 - 0.4 10e9/L 0.0   Absolute Nucleated RBC       0.0   IGG      610 - 1,616 mg/dL 649   IGA      84 - 499 mg/dL 199   IGM      35 - 242 mg/dL 36   Creatinine      0.52 - 1.04 mg/dL 0.96   GFR Estimate      >60 mL/min/1.73:m2 66   GFR Estimate If Black      >60 mL/min/1.73:m2 77   COVID-19 Virus PCR to [facility] - Source          COVID-19 Virus PCR to [facility] - Result          Neutrophil Cytoplasmic Antibody      <1:10 titer <1:10   Neutrophil Cytoplasmic Antibody Pattern       The ANCA IFA is <1:10.  No further testing will be performed.   CD19 B Cells      6 - 27 % <1 (L)   Absolute CD19      107 - 698 cells/uL <1 (L)   Myeloperoxidase Antibody IgG      0.0 - 0.9 AI 1.1 (H)   Proteinase 3 Antibody IgG      0.0 - 0.9 AI <0.2   Vitamin D Deficiency screening      20 - 75 ug/L 41   Sed Rate      0 - 30 mm/h 9   CRP Inflammation      0.0 - 8.0 mg/L <2.9   Albumin      3.4 - 5.0 g/dL 4.1   ALT      0 - 50 U/L 28   AST      0 - 45 U/L 18     Lab on 07/08/2022   Component Date Value Ref Range Status     Sodium 07/08/2022 143  133 - 144 mmol/L Final     Potassium 07/08/2022 3.9  3.4 - 5.3 mmol/L Final     Chloride 07/08/2022 108  94 - 109 mmol/L Final     Carbon Dioxide (CO2) 07/08/2022 25  20 - 32 mmol/L Final     Anion Gap 07/08/2022 10  3 - 14 mmol/L Final     Urea Nitrogen 07/08/2022 17  7 - 30 mg/dL Final     Creatinine 07/08/2022 1.01  0.52 - 1.04 mg/dL Final     Calcium 07/08/2022 9.3  8.5 - 10.1 mg/dL Final     Glucose 07/08/2022 103 (A) 70 - 99 mg/dL Final     GFR Estimate 07/08/2022 65  >60 mL/min/1.73m2 Final    Effective December 21, 2021 eGFRcr in adults is calculated using the 2021 CKD-EPI creatinine equation which includes age and gender (Ryan et al., NEJ, DOI: 10.1056/PQBJrk4154493)     WBC Count 07/08/2022 12.0 (A) 4.0 - 11.0 10e3/uL Final     RBC Count 07/08/2022 4.94  3.80 - 5.20 10e6/uL Final     Hemoglobin 07/08/2022 12.6  11.7 -  15.7 g/dL Final     Hematocrit 07/08/2022 39.6  35.0 - 47.0 % Final     MCV 07/08/2022 80  78 - 100 fL Final     MCH 07/08/2022 25.5 (A) 26.5 - 33.0 pg Final     MCHC 07/08/2022 31.8  31.5 - 36.5 g/dL Final     RDW 07/08/2022 13.6  10.0 - 15.0 % Final     Platelet Count 07/08/2022 350  150 - 450 10e3/uL Final     % Neutrophils 07/08/2022 66  % Final     % Lymphocytes 07/08/2022 22  % Final     % Monocytes 07/08/2022 9  % Final     % Eosinophils 07/08/2022 3  % Final     % Basophils 07/08/2022 0  % Final     % Immature Granulocytes 07/08/2022 0  % Final     NRBCs per 100 WBC 07/08/2022 0  <1 /100 Final     Absolute Neutrophils 07/08/2022 7.9  1.6 - 8.3 10e3/uL Final     Absolute Lymphocytes 07/08/2022 2.7  0.8 - 5.3 10e3/uL Final     Absolute Monocytes 07/08/2022 1.1  0.0 - 1.3 10e3/uL Final     Absolute Eosinophils 07/08/2022 0.3  0.0 - 0.7 10e3/uL Final     Absolute Basophils 07/08/2022 0.1  0.0 - 0.2 10e3/uL Final     Absolute Immature Granulocytes 07/08/2022 0.0  <=0.4 10e3/uL Final     Absolute NRBCs 07/08/2022 0.0  10e3/uL Final     Component      Latest Ref Rng & Units 8/19/2022   WBC      4.0 - 11.0 10e3/uL 12.6 (H)   RBC Count      3.80 - 5.20 10e6/uL 5.06   Hemoglobin      11.7 - 15.7 g/dL 12.8   Hematocrit      35.0 - 47.0 % 40.4   MCV      78 - 100 fL 80   MCH      26.5 - 33.0 pg 25.3 (L)   MCHC      31.5 - 36.5 g/dL 31.7   RDW      10.0 - 15.0 % 14.1   Platelet Count      150 - 450 10e3/uL 387   % Neutrophils      % 71   % Lymphocytes      % 20   % Monocytes      % 6   % Eosinophils      % 3   % Basophils      % 0   % Immature Granulocytes      % 0   NRBCs per 100 WBC      <1 /100 0   Absolute Neutrophils      1.6 - 8.3 10e3/uL 8.8 (H)   Absolute Lymphocytes      0.8 - 5.3 10e3/uL 2.5   Absolute Monocytes      0.0 - 1.3 10e3/uL 0.8   Absolute Eosinophils      0.0 - 0.7 10e3/uL 0.4   Absolute Basophils      0.0 - 0.2 10e3/uL 0.1   Absolute Immature Granulocytes      <=0.4 10e3/uL 0.1   Absolute NRBCs       10e3/uL 0.0   Color Urine      Colorless, Straw, Light Yellow, Yellow Straw   Appearance Urine      Clear Clear   Glucose Urine      Negative mg/dL 1000 (A)   Bilirubin Urine      Negative Negative   Ketones Urine      Negative mg/dL Negative   Specific Gravity Urine      1.003 - 1.035 1.020   Blood Urine      Negative Negative   pH Urine      5.0 - 7.0 5.0   Protein Albumin Urine      Negative mg/dL Negative   Urobilinogen mg/dL      Normal, 2.0 mg/dL Normal   Nitrite Urine      Negative Negative   Leukocyte Esterase Urine      Negative Negative   RBC Urine      <=2 /HPF 1   WBC Urine      <=5 /HPF 5   Squamous Epithelial /HPF Urine      <=1 /HPF <1   MPO Marline IgG Instrument Value      <3.5 U/mL 0.7   Myeloperoxidase Antibody IgG      Negative Negative   Proteinase 3 Marline IgG Instrument Value      <2.0 U/mL <1.0   Proteinase 3 Antibody IgG      Negative Negative   Protein Random Urine      g/L 0.11   Protein Total Urine g/gr Creatinine      0.00 - 0.20 g/g Cr 0.09   Creatinine Urine      mg/dL 128   IGG      610 - 1,616 mg/dL 641   IGA      84 - 499 mg/dL 204   IGM      35 - 242 mg/dL 32 (L)   Creatinine      0.52 - 1.04 mg/dL 1.24 (H)   GFR Estimate      >60 mL/min/1.73m2 51 (L)   CD19 B Cells      6 - 27 % <1 (L)   Absolute CD19      107 - 698 cells/uL <1 (L)   Neutrophil Cytoplasmic Antibody      <1:10 <1:10   Neutrophil Cytoplasmic Antibody Pattern       The ANCA IFA is <1:10.  No further testing will be performed.   AST      0 - 45 U/L 16   ALT      0 - 50 U/L 34   Albumin      3.4 - 5.0 g/dL 4.2   CRP Inflammation      0.0 - 8.0 mg/L 9.1 (H)   Sed Rate      0 - 30 mm/hr 15   Vitamin D Deficiency screening      20 - 75 ug/L 36     Component      Latest Ref Rng & Units 1/19/2023   WBC      4.0 - 11.0 10e3/uL 16.1 (H)   RBC Count      3.80 - 5.20 10e6/uL 5.39 (H)   Hemoglobin      11.7 - 15.7 g/dL 13.4   Hematocrit      35.0 - 47.0 % 41.9   MCV      78 - 100 fL 78   MCH      26.5 - 33.0 pg 24.9 (L)   MCHC       31.5 - 36.5 g/dL 32.0   RDW      10.0 - 15.0 % 15.4 (H)   Platelet Count      150 - 450 10e3/uL 383   % Neutrophils      % 79   % Lymphocytes      % 16   % Monocytes      % 4   % Eosinophils      % 1   % Basophils      % 0   % Immature Granulocytes      % 0   NRBCs per 100 WBC      <1 /100 0   Absolute Neutrophils      1.6 - 8.3 10e3/uL 12.6 (H)   Absolute Lymphocytes      0.8 - 5.3 10e3/uL 2.6   Absolute Monocytes      0.0 - 1.3 10e3/uL 0.7   Absolute Eosinophils      0.0 - 0.7 10e3/uL 0.2   Absolute Basophils      0.0 - 0.2 10e3/uL 0.1   Absolute Immature Granulocytes      <=0.4 10e3/uL 0.1   Absolute NRBCs      10e3/uL 0.0   Sodium      136 - 145 mmol/L 137   Potassium      3.4 - 5.3 mmol/L 4.0   Chloride      98 - 107 mmol/L 98   Carbon Dioxide (CO2)      22 - 29 mmol/L 25   Anion Gap      7 - 15 mmol/L 14   Urea Nitrogen      6.0 - 20.0 mg/dL 23.6 (H)   Creatinine      0.51 - 0.95 mg/dL 1.09 (H)   Calcium      8.6 - 10.0 mg/dL 10.3 (H)   Glucose      70 - 99 mg/dL 232 (H)   Alkaline Phosphatase      35 - 104 U/L 95   AST      10 - 35 U/L 23   ALT      10 - 35 U/L 26   Protein Total      6.4 - 8.3 g/dL 7.7   Albumin      3.5 - 5.2 g/dL 4.9   Bilirubin Total      <=1.2 mg/dL 0.3   GFR Estimate      >60 mL/min/1.73m2 59 (L)   Color Urine      Colorless, Straw, Light Yellow, Yellow Light Yellow   Appearance Urine      Clear Clear   Glucose Urine      Negative mg/dL >=1000 (A)   Bilirubin Urine      Negative Negative   Ketones Urine      Negative mg/dL 10 (A)   Specific Gravity Urine      1.003 - 1.035 1.033   Blood Urine      Negative Negative   pH Urine      5.0 - 7.0 6.0   Protein Albumin Urine      Negative mg/dL Negative   Urobilinogen mg/dL      Normal, 2.0 mg/dL Normal   Nitrite Urine      Negative Negative   Leukocyte Esterase Urine      Negative Negative   Iron      37 - 145 ug/dL 51   Iron Binding Capacity      240 - 430 ug/dL 362   Iron Sat Index      15 - 46 % 14 (L)   Parathyroid Hormone Intact      15  - 65 pg/mL 51   Calcium Ionized Whole Blood      4.4 - 5.2 mg/dL 4.9   Ferritin      11 - 328 ng/mL 70   TSH      0.30 - 4.20 uIU/mL 0.40     ROS: A comprehensive ROS was done. Positives are per HPI.      HISTORY REVIEW:  Past Medical History:   Diagnosis Date     Abnormal glandular Papanicolaou smear of cervix 1992     Allergic rhinitis     Allergy, airborne subst     Arthritis      ASCVD (arteriosclerotic cardiovascular disease)      Chronic pain      Chronic pancreatitis (H)     idiopathic, Tx: PPI, antioxidants, pancreatic enzymes     Common migraine      Coronary artery disease      Costochondritis      Difficulty in walking(719.7)      Dyspnea on exertion      Ectasia, mammary duct     [facility], persistent nipple discharge     Elevated fasting glucose      Gastro-oesophageal reflux disease      Granulomatosis with polyangiitis (H)      Hernia      History of angina      Hyperlipidemia LDL goal < 100      Hypertension goal BP (blood pressure) < 140/90     Essential hypertension     Iron deficiency anemia      Ischemic cardiomyopathy      Menorrhagia      Migraine headaches      Mild persistent asthma      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 Matias, neg w/u, Rx'd w/prednisone     Ventral hernia, unspecified, without mention of obstruction  or gangrene 2012     Past Surgical History:   Procedure Laterality Date     C/SECTION, LOW TRANSVERSE  1996         CARDIAC SURGERY      cardiac stent- recent in 16; 4 other stents     DILATION AND CURETTAGE N/A 2016    Procedure: DILATION AND CURETTAGE;  Surgeon: Nahed Butler MD;  Location: [facility]     ESOPHAGOSCOPY, GASTROSCOPY, DUODENOSCOPY (EGD), COMBINED N/A 2022    Procedure: ESOPHAGOGASTRODUODENOSCOPY, WITH BIOPSY;  Surgeon: Enzo Sesay MD;  [facility]     HC UGI ENDOSCOPY W EUS  2008    Dr. Pastrana, possible chronic pancreatitis, fatty liver     HERNIA REPAIR  2012    done at [facility] [place]     INSERT INTRAUTERINE DEVICE N/A 2016    Procedure: INSERT INTRAUTERINE DEVICE;  Surgeon: Nahed Butler MD;  [facility]: [facility]     INT UTERINE FIBRIOD EMBOLIZATION  10/29/2014     LAPAROSCOPIC CHOLECYSTECTOMY  2008    Dr. Arnol GRUBBS TX, CERVICAL  1992    s/p GORDONP, done at [facility] in [place].     ORBITOTOMY Right 03/15/2016    Procedure: ORBITOTOMY;  Surgeon: Myron Cyr MD;  Location: [facility]     ORBITOTOMY Right 2017    Procedure: ORBITOTOMY;  RIGHT ORBITOTOMY AND BIOPSY;  Surgeon: Charis Holbrook MD;  Location: [facility]     REPAIR PTOSIS Right 2017    Procedure: REPAIR PTOSIS;  RIGHT UPPER LID PTOSIS REPAIR;  Surgeon: Myron Cyr MD;  Location: [facility]     UPPER GI ENDOSCOPY  10/21/2008    mild gastritis, Dr. Rocky CALDERA ECHO HEART XTHORACIC,COMPLETE, W/O DOPPLER  2004    [facility] WNL, EF 60%     Family History   Problem Relation Age of Onset     Heart Disease Father 50        heart attack     Cerebrovascular Disease Father      Diabetes Father      Hypertension Father      Depression Father      C.A.D. Father      Hypertension Mother      Arthritis Mother      Heart Disease Mother         long qt syndrome     Thyroid Disease Mother      C.A.D. Mother      Heart Disease Brother 15         MI at 15, 16.      Diabetes Maternal Uncle      Hypertension Maternal Aunt      Hypertension Maternal Uncle      Arthritis Brother         he passed away, had severe arthritis at age 11     Arthritis Maternal Uncle      Eye Disorder Maternal Uncle         cataracts     Neurologic Disorder Sister         migraines     Neurologic Disorder Sister         migraines     Respiratory Son         asthma     Cerebrovascular Disease Maternal Uncle      C.A.D. Brother      Family History Negative Other         neg for RA, SLE     Unknown/Adopted No family hx of         unknown neurological issues in her family, mother was adopted     Skin Cancer No family hx of         No known family hx of skin cancer     Social History     Socioeconomic History     Marital status: Single     Spouse name: Not on file     Number of children: 1     Years of education: Not on file     Highest education level: Not on file   Occupational History     Employer: NONE    Tobacco Use     Smoking status: Some Days     Packs/day: 0.20     Years: 1.00     Pack years: 0.20     Types: Cigarettes     Last attempt to quit: 2016     Years since quittin.9     Smokeless tobacco: Never   Substance and Sexual Activity     Alcohol use: No     Alcohol/week: 0.0 standard drinks     Drug use: No     Sexual activity: Not Currently   Other Topics Concern     Parent/sibling w/ CABG, MI or angioplasty before 65F 55M? Yes   Social History Narrative    Balanced Diet - sometimes    Osteoporosis Prevention Measures - Dairy servings per day: 2 servings weekly    Regular Exercise -  Yes Describe walking 4 times a week    Dental Exam - NO    Seatbelts used - Yes    Self Breast Exam - Yes    Abuse: Current or Past (Physical, Sexual or Emotional)- No    Do you have any concerns about STD's -  No    Do you feel safe in your environment - No    Guns stored in the home - No    Sunscreen used - Yes    Lipids -  YES - Date:     Glucose -  YES - Date:      Eye Exam - YES - Date: one year ago    Colon Cancer Screening - No    Hemoccults - NO    Pap Test -  YES - Date: 070904, remote history of LEEP    Mammography - YES - Date: last spring, would like to discuss, needs a referral to [facility] center    DEXA - NO    Immunizations reviewed and up to date - Yes, last td given in 1997 or 1998     Social Determinants of Health     Financial Resource Strain: Not on file   Food Insecurity: Not on file   Transportation Needs: Not on file   Physical Activity: Not on file   Stress: Not on file   Social Connections: Not on file   Intimate Partner Violence: Not on file   Housing Stability: Not on file     Patient Active Problem List   Diagnosis     Seasonal affective disorder (H)     Allergic rhinitis     PCOS (polycystic ovarian syndrome)     Moderate persistent asthma     Chronic pancreatitis (H)     Hypertension goal BP (blood pressure) < 140/90     Common migraine     NSTEMI (non-ST elevated myocardial infarction) (H)     Hyperlipidemia LDL goal <70     ASCVD (arteriosclerotic cardiovascular disease)     GERD (gastroesophageal reflux disease)     Ischemic cardiomyopathy     Hypertensive heart disease     Uterine leiomyoma     Iron deficiency anemia     Costochondritis     Vitamin D deficiency     Breast cancer screening     Spinal stenosis in cervical region     Fibromyalgia     Seronegative rheumatoid arthritis (H)     Type 2 diabetes, HbA1C goal < 8% (H)     Type 2 diabetes mellitus with other specified complication (H)     Hyperlipidemia associated with type 2 diabetes mellitus (H)     Hypertension associated with diabetes (H)     Overweight with body mass index (BMI) 25.0-29.9     Tobacco use disorder     Telogen effluvium     CAD S/P percutaneous coronary angioplasty     Status post coronary angiogram     ANCA-associated vasculitis     Wegener's vasculitis     Type 1 diabetes mellitus with complications (H)     Chest discomfort     Urinary frequency     Abdominal  pain, epigastric     Hypokalemia     Leukocytosis, unspecified type     Acute pancreatitis, unspecified complication status, unspecified pancreatitis type       Pregnancy Hx: She is . All miscarriages were in first trimester. H/o OC use in the past. 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    PMHx, FHx, SHx were reviewed, unchanged.    Outpatient Encounter Medications as of 2023   Medication Sig Dispense Refill     acetaminophen (TYLENOL) 325 MG tablet Take 1-2 tablets (325-650 mg) by mouth every 6 hours as needed for pain (headache) 250 tablet 4     ADVAIR DISKUS 250-50 MCG/ACT inhaler        albuterol (2.5 MG/3ML) 0.083% neb solution INL 1 VIAL VIA NEBULIZATION Q 4 TO 6 HOURS PRN  1     albuterol (PROAIR HFA, PROVENTIL HFA, VENTOLIN HFA) 108 (90 BASE) MCG/ACT inhaler Inhale 2 puffs into the lungs every 6 hours as needed for shortness of breath / dyspnea or wheezing 3 Inhaler 1     azelastine (ASTELIN) 0.1 % nasal spray USE 1 TO 2 SPRAYS IN EACH NOSTRIL TWICE DAILY AS NEEDED (Patient not taking: Reported on 2023)       BANOPHEN 25 MG tablet Take 25 mg by mouth At Bedtime       blood glucose (NO BRAND SPECIFIED) lancets standard Use to test blood sugar 1-4 times daily or as directed. 400 each 3     blood glucose (NO BRAND SPECIFIED) test strip Use to test blood sugar 1-4 times daily or as directed. 400 strip 3     Blood Pressure Monitor KIT 1 each daily Monitor home blood pressure as instructed by physician.  Dispense [facility] blood pressure kit. 1 kit 0     calcium carbonate (TUMS) 500 MG chewable tablet Take 3-4 tablets (1,500-2,000 mg) by mouth daily as needed 90 tablet 3     clopidogrel (PLAVIX) 75 MG tablet Take 1 tablet (75 mg) by mouth daily . 30-day refills only. 30 tablet 11     COMPRESSION STOCKINGS 2 each daily Apply one 10-15 mmHg compression stocking to each lower extgmierty during the day and remove before bedtime. 4 each 2     cyclobenzaprine (FLEXERIL) 10 MG tablet Take 1  tablet (10 mg) by mouth 2 times daily as needed for muscle spasms 60 tablet 3     dicyclomine (BENTYL) 20 MG tablet TAKE 1 TABLET(20 MG) BY MOUTH FOUR TIMES DAILY AS NEEDED. 60 tablet 0     diphenhydrAMINE (BENADRYL) 25 MG capsule Take 1 capsule (25 mg) by mouth nightly as needed for itching or allergies 30 capsule 2     empagliflozin (JARDIANCE) 10 MG TABS tablet Take 1 tablet (10 mg) by mouth daily 90 tablet 3     EPIPEN 2-RIKY 0.3 MG/0.3ML injection INJECT 0.3 MG INTO THE MUSCLE PRF ANAPHYALAXIS  0     Ergocalciferol (VITAMIN D2) 50 MCG (2000 UT) TABS Take 2,000 Units by mouth daily (Patient not taking: Reported on 1/19/2023) 90 tablet 1     esomeprazole (NEXIUM) 40 MG DR capsule Take 1 capsule (40 mg) by mouth 2 times daily Take 30-60 minutes before eating. 180 capsule 0     estradiol (VAGIFEM) 10 MCG TABS vaginal tablet Place 1 tablet (10 mcg) vaginally twice a week 8 tablet 11     famotidine (PEPCID) 20 MG tablet Take 1 tablet (20 mg) by mouth 2 times daily as needed (abdominal pain) 20 tablet 0     fexofenadine (ALLEGRA) 180 MG tablet Take 1 tablet by mouth daily as needed. 90 tablet 3     fluconazole (DIFLUCAN) 200 MG tablet Take 1 tablet (200 mg) by mouth daily 7 tablet 0     fluticasone (FLONASE) 50 MCG/ACT nasal spray SHAKE LIQUID AND USE 1 TO 2 SPRAYS IN EACH NOSTRIL EVERY DAY       folic acid (FOLVITE) 1 MG tablet Take 1 tablet (1 mg) by mouth daily 90 tablet 2     insulin glargine (LANTUS PEN) 100 UNIT/ML pen Inject 75 Units Subcutaneous every morning Or as directed. 75 mL 3     insulin pen needle (BD MARCK U/F) 32G X 4 MM miscellaneous USE DAILY OR AS DIRECTED 300 each 3     ketoconazole (NIZORAL) 2 % shampoo Apply topically daily as needed       levocetirizine (XYZAL) 5 MG tablet Take 5 mg by mouth daily       levofloxacin (LEVAQUIN) 500 MG tablet Take 1 tablet (500 mg) by mouth daily 7 tablet 0     levonorgestrel (MIRENA) 20 MCG/DAY IUD 1 each by Intrauterine route once        lisinopril-hydrochlorothiazide (ZESTORETIC) 20-25 MG tablet Take 1 tablet by mouth daily . 30-day refills only. 30 tablet 11     magnesium 250 MG tablet TAKE 1 TABLET(250 MG) BY MOUTH TWICE DAILY 60 tablet 3     metFORMIN (GLUCOPHAGE-XR) 500 MG 24 hr tablet Take 4 tablets (2,000 mg) by mouth daily (with dinner) 120 tablet 11     metoprolol succinate ER (TOPROL XL) 100 MG 24 hr tablet Take 1 tablet (100 mg) by mouth daily . 30-day refills only. 30 tablet 11     Multiple Vitamin (DAILY-GERALD MULTIVITAMIN) TABS Take 1 tablet by mouth daily 90 tablet 1     nitroGLYcerin (NITROSTAT) 0.4 MG sublingual tablet Place 1 tablet (0.4 mg) under the tongue every 5 minutes as needed for chest pain . After 3 doses, if pain persists call 911. 25 tablet 3     olopatadine (PATANOL) 0.1 % ophthalmic solution        pravastatin (PRAVACHOL) 40 MG tablet Take 1 tablet (40 mg) by mouth daily . 30-day refills only. 30 tablet 11     prochlorperazine (COMPAZINE) 5 MG tablet Take 1 tablet (5 mg) by mouth every 6 hours as needed for nausea or vomiting 60 tablet 3     sennosides (SENOKOT) 8.6 MG tablet 1-2 tabs a day as needed for constipation 60 tablet 1     spironolactone (ALDACTONE) 25 MG tablet Take 1 tablet (25 mg) by mouth daily . Take one additional 0.5 tab daily as needed for weight gain. 45 day refills. 45 tablet 11     sucralfate (CARAFATE) 1 GM tablet Take 1 tablet (1 g) by mouth 4 times daily 120 tablet 3     terbinafine (LAMISIL) 1 % external cream Apply topically 2 times daily 42 g 1     terconazole (TERAZOL 3) 80 MG vaginal suppository Place 1 suppository (80 mg) vaginally At Bedtime (Patient not taking: Reported on 1/19/2023) 3 suppository 1     traMADol (ULTRAM) 50 MG tablet TAKE 1 TABLET(50 MG) BY MOUTH EVERY 8 HOURS AS NEEDED FOR SEVERE PAIN 60 tablet 3     vitamin B-2 (RIBOFLAVIN) 100 MG TABS tablet Take 2 tablets (200 mg) by mouth daily (Patient not taking: Reported on 1/19/2023) 30 tablet 11     vitamin D2 (ERGOCALCIFEROL)  51791 units (1250 mcg) capsule Take 1 capsule (50,000 Units) by mouth once a week 12 capsule 0     No facility-administered encounter medications on file as of 1/25/2023.       Allergies   Allergen Reactions     Amoxicillin-Pot Clavulanate      Artificial Sweetner (Informational Only)  Other (See Comments)     Increased headache     Augmentin      Unknown possible hives and edema     Azithromycin      Diatrizoate Other (See Comments)     Pt wants this listed because she is allergic to shellfish      Imitrex [Sumatriptan]      Severe face/neck/chest tightness and flushing side effects      Penicillins Hives     Unknown      Pork Allergy      Stomach pain, cramping, diarrhea, itching, nausea and headaches     Shellfish Allergy Hives and Swelling     Sulfa Drugs Hives and Swelling     Zithromax [Azithromycin Dihydrate] Swelling     Unknown        Ph.E:    No resp distress on talking over the phone    Assessment/Plan:    1-Seropositive non-erosive RA (arthritis, AM stiffness, high CRP, RF 26 but re-check neg 3/2015 on HCQ) Dx 5/2013, FMS, new pleuritic CP 3/20-15-5/2015 resolved on steroids. She is on  mg bid since 5/2014. She tolerates it well and it's helping some but not enough to control all her pain. Continued having flare ups of joint pain, could be GPA related. Some pain is due to FMS.My impression is that her arthralgias are a combination of RA/ IA sec GPA, fibromyalgia and chronic pain.     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 Prednisone was not a good option for her given weight gain, diabetes. She tried and did not tolerate MTX, AZA.    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 Repeat MPO was positive in 6/2017.    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 Shah  and Dr. Valdez at [place]. 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     She is on rituximab since 12/12/2018 with great response, minor allergic reaction which could be managed by pre-meds and extra steroid dose. Developed high BG and vaginal yeast infections after solumedrol premed. Treated candida with fluconazole. Will decrease solumerdol dose to IV 80 mg prior to receiving rituximab. 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 However, feels Rituximab effects wear off around 4 months. According to ACR guidelines can give every 4-6 months. Pt elected to received this upcoming September which will be approximately 5 months from last dose. Repeat Orbit CT in September 2021 demonstrated improvement.     Last visit in April 2022-- Recommended evusheld given b cell depletion tx as rituximab blocks the response to covid vaccine, she is concerned about side effects. I advised her to discuss with [facility] pharmacist.      Today 1/25/2023: Janine has been ill since Dx of COVID on 12/17/2022. Her most recent labs were reviewed, stable vasculitis labs in 8/2022 with CD19<1, neg vasculitis markers. In 1/2023, no anemia and nl thyroid function test. I ordered vit D as add on. 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 She does need to follow up on abnormal thyroid US and mammogram as well. Our plan for ANCA vasculitis was to give rituximab 1 gram [id number] as benefit did not last 6 months with last rituximab on 9/7/2022, but today we both agreed to give next dose in March after 6 months to let her body heal from COVID. I will see her in person, in early march to determine if it is ok to give another dose of rituxiamb. 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 Also ordered shower chair,  commode per her request given significant fatigue, body pain.    2-Fat pads. She was seen by endocrinology and cushing was ruled out in 4/2014. Was advised to do f/u for enlarged thyroid/thyroid nodules.  3-Hair loss. Continue with dermatology  4-Chronic pain. F/U with pain clinic  5-Vit D deficiency. Was replaced with vit D 50, 000 units po qwk x 12 wk then 2000 units every day  6-Discussed COVID vaccination, timing with rituximab to get max benefit, she had severe sore arm.  7-?HCQ toxicity on OCT 7/2021, now off HCQ, could explain increased arthralgia  8- Chronic Headaches. Symptoms worsen after taking zofran. Has received compazine in hospital in the past without adverse effect. Will have patient trial Compazine       Today's plan:    Plan to repeat rituximab 1 gram in March, when you feel better. Plan for less steroid with rituximab infusions (solumedrol 80 mg IV)    X-ray of C spine and labs on 2/10    Shower bath chair, commode order to be mailed    Return in person 3/9 at noon add on      Orders Placed This Encounter   Procedures     X-ray Cervical spine 2-3 vws     Myeloperoxidase and Proteinase 3 Panel     CRP inflammation     Erythrocyte sedimentation rate auto     CD19 B Cell Count     ANCA IgG by IFA with Reflex to Titer     Immunoglobulins A G and M     Vitamin D Deficiency     Miscellaneous Order for DME - ONLY FOR DME     Miscellaneous Order for DME - ONLY FOR DME       Majo Mckinnon MD

## 2023-01-25 NOTE — NURSING NOTE
Patient declined individual allergy and medication review by support staff because patient stated she was just seen 01/19/2023 & nothing has changed.       PT stated that she has covid and extreme neck and back pain.

## 2023-01-25 NOTE — LETTER
1/25/2023       RE: Janine Cornell  [address]  [facility] [address]     Dear Colleague,    Thank you for referring your patient, Janine Cornell, to the [facility] RHEUMATOLOGY CLINIC [place] at [facility]. Please see a copy of my visit note below.    Janine is a 57 year old who is being evaluated via a billable telephone visit.      What phone number would you like to be contacted at? [phone]  How would you like to obtain your AVS? Mail a copy    [facility]istant Location (provider location):  On-site  Phone call duration: 17 minutes     Rheumatology F/U Virtual Visit Note    Last visit note:08/19/2022     Today's visit date: 1/25/2023    Reason for in person visit: F/U GPA    HPI     Ms. 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 She is on HCQ since 5/2014. On rituximab since 12/20218 with great response. Previously tried and did not tolerate MTX, AZA.    She had lateral orbitotomy and debulking orbital mass right eye on 9/26/18 with Dr. Shah and Dr. Valdez at [place]. 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 There were no complications according to the op note.    Today 1/25/2023: Janine is doing a phone visit for follow up, was feeling ill and switched in person to phone visit. She got sick around Thanksgiving, saw her PCP on 12/17, was tested positive for COVID, it took a longtime to feel better, did not take paxlovid as it was already past 2 weeks. Since then, she has not been feeling well. Has headaches, body ache, her eyes especially R eye look pink, they burn and itch a lot. Has sense of smell but can not taste her food. She is very tired, hardly leaves her house. Last time, left house for doctor visit, was tired and sore. Has tingling and pinprick feeling over arms and legs. Has upcoming follow up with PCP on 2/10. She had thyroid  US for thyroid nodules. She had abnormal screening mammogram on 1/9, will go back for diagnostic mammogram and US of L breast on 1/31.    08/19/2022  Reports fatigue and headaches. Headache worsens after taking Zofran for nausea. Joint symptoms come and go. Last rituximab on 4/6/22. Rituximab is helpful but feels like it wears off prior to the 6 months. Next appointment scheduled for October 2022. Develops intermittent vaginal yeast infection and thrush related to rising blood glucose. Right eye without symptoms. No new fevers, chills, skin changes. Son is wondering if she takes herbal supplements for headaches will this interact with any of her medications. Scheduled to meet with pharmacist today.        4/22/2022:   Each rituximab infusion causes vaginal yeast infection and thrush related to rise in BG. Her BG has stayed in higher level since last rituximab. Has more ache and pain, myalgia and arthralgia. Recently has had headaches with high BP. Had last rituximab 1000 mg on 4/6.Her R eye is itching and swelling up.  Today, her BG is 177.Has gained 20 lbs since Feb 2022. Had severe pain in her arm after covid vaccine, it took a month to get better.      12/16/2021: Janine presents for follow up. Reports ESOB with cold, has ongoing joint pain. R eye pain is intermittent.  Reports headaches after rituximab 1 gram on 10/18/2021.  Last week, her R knee was hurting.      8/20/2021: Janine has pain over arms, knees. Some days, feel stiff all day long. Some days can't do stairs. Hard to tell if there is swelling as has more water retention during summer.  Some days, eye swells up like today. No fevers, SOB, CP or cough.  Has rosacea but some days wakes up with heat rash over sun. Her face is peeling.  Sometimes can't lift things or grab things with pain from elbow to hands. Has shoulder and neck pain but this forearm pain is new.  Stopped HCQ in mid July due to concern for potential HCQ toxicity on OCT, her eye exam with   Saulo has been re-scheduled and upcoming on 9/14 to address HCQ toxicity, pain is not worse off HCQ.  Not COVID vaccinated but is cautious.      5/10/2021: Joint ache, knees hurt, R elbow hurts, R arm hurts, R hand hurts. Has lots of swelling in her joints especially hands.    Depending on weather, joints jhurt, sometimes pain is 7/10.  Has problem with taking stairs and balance.  Gets off balance.   R eye gets tinglin, swelling.  Gets out of breath, gets worse with seasonal allergies.  Over past month and half, took nitro more, like 8 times.  No hemooptysis, no hematuria.  Has allergies.      4/21/2021: Had last rituximab 1 gram on 1/19, 2/2/2021. Reports rituximab starts to wear off earlier, has more sx this time. Eye swelling is intermittent. Gets indigestion/ GERD sx with constipation after the infusion.  She reports having asthma, swelling of neck, arms. She thinks that it could be from her vasculitis. She is worried about going down on rituximab dose.   Otherwise unchanged sx, no SOB or hemoptysis or persistent cough, or   Somedays her knees and hips hurt a lot, feel like bone on bone in her knees, especially on changing position.      Component      Latest Ref Rng 2/28/2013 2/28/2013          12:14 PM 12:14 PM   WBC      4.0 - 11.0 10e9/L     RBC Count      3.8 - 5.2 10e12/L     Hemoglobin      11.7 - 15.7 g/dL     Hematocrit      35.0 - 47.0 %     MCV      78 - 100 fl     MCH      26.5 - 33.0 pg     MCHC      31.5 - 36.5 g/dL     RDW      10.0 - 15.0 %     Platelet Count      150 - 450 10e9/L     Specimen Description           Lyme Screen IgG and IgM           Vitamin D Deficiency screening      30 - 75 ug/L     Ferritin      10 - 300 ng/mL     Sed Rate      0 - 20 mm/h     ALLI Screen by EIA      <1.0     Rheumatoid Factor      0 - 14 IU/mL     CK Total      30 - 225 U/L  78   Uric Acid      2.5 - 7.5 mg/dL 6.7      Component      Latest Ref Rng 2/28/2013          12:14 PM   WBC      4.0 - 11.0 10e9/L    RBC  Count      3.8 - 5.2 10e12/L    Hemoglobin      11.7 - 15.7 g/dL    Hematocrit      35.0 - 47.0 %    MCV      78 - 100 fl    MCH      26.5 - 33.0 pg    MCHC      31.5 - 36.5 g/dL    RDW      10.0 - 15.0 %    Platelet Count      150 - 450 10e9/L    Specimen Description       Serum   Lyme Screen IgG and IgM       Test value: <0.75....Interpretation: Negative....If you highly suspect Lyme . . .   Vitamin D Deficiency screening      30 - 75 ug/L    Ferritin      10 - 300 ng/mL    Sed Rate      0 - 20 mm/h    ALLI Screen by EIA      <1.0    Rheumatoid Factor      0 - 14 IU/mL    CK Total      30 - 225 U/L    Uric Acid      2.5 - 7.5 mg/dL      Component      Latest Ref Rng 2/28/2013 2/28/2013 2/28/2013 2/28/2013          12:14 PM 12:14 PM 12:14 PM 12:14 PM   WBC      4.0 - 11.0 10e9/L       RBC Count      3.8 - 5.2 10e12/L       Hemoglobin      11.7 - 15.7 g/dL       Hematocrit      35.0 - 47.0 %       MCV      78 - 100 fl       MCH      26.5 - 33.0 pg       MCHC      31.5 - 36.5 g/dL       RDW      10.0 - 15.0 %       Platelet Count      150 - 450 10e9/L       Specimen Description             Lyme Screen IgG and IgM             Vitamin D Deficiency screening      30 - 75 ug/L       Ferritin      10 - 300 ng/mL    10   Sed Rate      0 - 20 mm/h   23 (H)    ALLI Screen by EIA      <1.0  <1.0 . . .     Rheumatoid Factor      0 - 14 IU/mL 26 (H)      CK Total      30 - 225 U/L       Uric Acid      2.5 - 7.5 mg/dL         5/2013  CBC WITH PLATELETS DIFFERENTIAL       Component Value Range    WBC 11.3 (*) 4.0 - 11.0 10e9/L    RBC Count 4.56  3.8 - 5.2 10e12/L    Hemoglobin 11.1 (*) 11.7 - 15.7 g/dL    Hematocrit 34.3 (*) 35.0 - 47.0 %    MCV 75 (*) 78 - 100 fl    MCH 24.3 (*) 26.5 - 33.0 pg    MCHC 32.4  31.5 - 36.5 g/dL    RDW 16.1 (*) 10.0 - 15.0 %    Platelet Count 315  150 - 450 10e9/L    Diff Method Automated Method      % Neutrophils 71.6  40 - 75 %    % Lymphocytes 20.9  20 - 48 %    % Monocytes 4.3  0 - 12 %    %  Eosinophils 2.8  0 - 6 %    % Basophils 0.2  0 - 2 %    % Immature Granulocytes 0.2  0 - 0.4 %    Absolute Neutrophil 8.1  1.6 - 8.3 10e9/L    Absolute Lymphocytes 2.4  0.8 - 5.3 10e9/L    Absolute Monoctyes 0.5  0.0 - 1.3 10e9/L    Absolute Eosinophils 0.3  0.0 - 0.7 10e9/L    Absolute Basophils 0.0  0.0 - 0.2 10e9/L    Abs Immature Granulocytes 0.0  0 - 0.03 10e9/L   AMYLASE       Component Value Range    Amylase 103  30 - 110 U/L   COMPREHENSIVE METABOLIC PANEL       Component Value Range    Sodium 144  133 - 144 mmol/L    Potassium 3.8  3.4 - 5.3 mmol/L    Chloride 105  94 - 109 mmol/L    Carbon Dioxide 23  20 - 32 mmol/L    Anion Gap 17  6 - 17 mmol/L    Glucose 173 (*) 60 - 99 mg/dL    Urea Nitrogen 13  5 - 24 mg/dL    Creatinine 0.83  0.52 - 1.04 mg/dL    GFR Estimate 74  >60 mL/min/1.7m2    GFR Estimate If Black 90  >60 mL/min/1.7m2    Calcium 9.7  8.5 - 10.4 mg/dL    Bilirubin Total 0.4  0.2 - 1.3 mg/dL    Albumin 4.3  3.9 - 5.1 g/dL    Protein Total 7.8  6.8 - 8.8 g/dL    Alkaline Phosphatase 66  40 - 150 U/L    ALT 36  0 - 50 U/L    AST 28  0 - 45 U/L   CK TOTAL       Component Value Range    CK Total 66  30 - 225 U/L   CRP INFLAMMATION       Component Value Range    CRP Inflammation 10.4 (*) 0.0 - 8.0 mg/L   LIPASE       Component Value Range    Lipase 353 (*) 20 - 250 U/L   ERYTHROCYTE SEDIMENTATION RATE AUTO       Component Value Range    Sed Rate 26 (*) 0 - 20 mm/h   ROUTINE UA WITH MICROSCOPIC REFLEX TO CULTURE       Component Value Range    Color Urine Yellow      Appearance Urine Slightly Cloudy      Glucose Urine 30 (*) NEG mg/dL    Bilirubin Urine Negative  NEG    Ketones Urine 5 (*) NEG mg/dL    Specific Gravity Urine 1.026  1.003 - 1.035    Blood Urine Trace (*) NEG    pH Urine 5.0  5.0 - 7.0 pH    Protein Albumin Urine 10 (*) NEG mg/dL    Urobilinogen mg/dL Normal  0.0 - 2.0 mg/dL    Nitrite Urine Negative  NEG    Leukocyte Esterase Urine Negative  NEG    Source Midstream Urine      WBC Urine  1  0 - 2 /HPF    RBC Urine 4 (*) 0 - 2 /HPF    Squamous Epithelial /HPF Urine <1  0 - 1 /HPF    Mucous Urine Present (*) NEG /LPF    Hyaline Casts 3 (*) 0 - 2 /LPF    Calcium Oxalate Moderate (*) NEG /HPF   COMPLEMENT C3       Component Value Range    Complement C3 143  76 - 169 mg/dL   COMPLEMENT C4       Component Value Range    Complement C4 31  15 - 50 mg/dL   HEPATITIS C ANTIBODY       Component Value Range    Hepatitis C Antibody Negative  NEG   CARDIOLIPIN ANTIBODY IGG AND IGM       Component Value Range    Cardiolipin IgG Marline    0 - 15.0 GPL    Value: <15.0      Interpretation:  Negative    Cardiolipin IgM Marline    0 - 12.5 MPL    Value: <12.5      Interpretation:  Negative   LUPUS PANEL       Component Value Range    Lupus Result    NEG    Value: Negative      (Note)      COMMENTS:      The INR is normal.      APTT is normal.  1:2 Mix is not indicated.      DRVVT Screen is normal.      Thrombin time is normal.      NEGATIVE TEST; A LUPUS ANTICOAGULANT WAS NOT DETECTED IN THIS      SPECIMEN WITHIN THE LIMITS OF THE TESTING REPERTOIRE.      If the clinical picture is strongly suggestive of an antiphospholipid      syndrome, recommend anticardiolipin and beta-2-glycoprotein (IgG and      IgM) antibody tests.      Leela Franks M.D.  [phone]      5/2/2013            INR =  0.93 N = 0.86-1.14  (No additional charge)      TT = 15.7 N = 13.0-19.0 sec  (No additional charge)            APTT'S:    Seconds      Reagent =  Stago LA      Normal  =  38      Patient  =  34      1:2 Mix  =  N/A      Reference =  31-43             DILUTE MADELINE VIPER VENOM TEST:      DRVVT Screen Ratio = 0.76 Normal = <1.21         IMMUNOGLOBULIN G SUBCLASSES       Component Value Range    IGG 1030  695 - 1620 mg/dL    IgG1 488  300 - 856 mg/dL    IgG2 325  158 - 761 mg/dL    IgG3 47  24 - 192 mg/dL    IgG4 18  11 - 86 mg/dL   SUNNY ANTIBODY PANEL       Component Value Range    Ribonucleic Protein IgG Antibody 0      Smith  Antibody IgG 1      SSA (RO) Antibody IgG 4      SSB (LA) Antibody IgG 0      Scleroderma Antibody IgG 0     BETA 2 GLYCOPROTEIN ANTIBODIES IGG IGM       Component Value Range    Beta-2-Glycopro IgG 1      Beta-2-Glycopro IgM 5     CYCLIC CIT PEPT IGG       Component Value Range    Cyclic Cit Pept IgG/IgA    <20 UNITS    Value: <20      Interpretation:  Negative   DNA DOUBLE STRANDED ANTIBODIES       Component Value Range    DNA-ds    0 - 29 IU/mL    Value: <15      Interpretation:  Negative       CT CHEST PULMONARY EMBOLISM W CONTRAST 5/20/2015 4:57 PM  HISTORY: Pain. SOB. Elevated d-dimer.   TECHNIQUE: 65 mL Isovue 370. Axial images with coronal  reconstructions.  COMPARISON: None.  FINDINGS: Calcified granuloma with surrounding scarring in the  posterolateral left upper lobe. Triangular shaped opacity at the right  lung base in the lateral right middle lobe could represent some  scarring, atelectasis or infiltrate. There is also some scarring or  atelectasis in the posteromedial right lung base. Lungs otherwise  clear.  The pulmonary arteries are well opacified. No CT evidence for acute  pulmonary embolus. No aortic dissection.  Diffuse fatty infiltration of the liver.  IMPRESSION  IMPRESSION:   1. No pulmonary embolus identified.  2. Small focus of atelectasis, infiltrate or scarring in the lateral  right middle lobe.  3. Old granulomatous disease.  4. Otherwise negative.  SILVERIO VAZQUEZ MD    Copath Report      Patient Name: FAVIO MARTINEZ   MR#: [id number]   Specimen #: [id number]   Collected: 3/15/2016   Received: 3/15/2016   Reported: 3/17/2016 13:33   Ordering Phy(s): PARVEEN ENRIQUEZ     ORIGINAL REPORT FOLLOWS ADDENDUM  ADDENDUM     TO ORIGINAL REPORT   Status: Signed Out   Date Ordered:3/18/2016   Date Complete:3/18/2016   Date Reported:3/18/2016 12:06   Signed Out By: Marianne Godfrey MD     INTERPRETATION:   This addendum is done to incorporate the results of fungal (GMS) stains   done on the  "specimen.  Specimen is negative for fungal organisms.  The   original final diagnosis remains unchanged.     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  Negative for malignancy(Please   see microscopic description)     Electronically signed out by:     Marianne Godfrey MD     CLINICAL HISTORY:   right orbital mass     GROSS:   The specimen is received labeled \"right orbital biopsy\" and consists of   red-tan nodule measuring 1.5 x 0.9 x 0.6 cm with one smooth side and   opposite rough side consistent with periosteum.  The specimen is   bisected revealing homogenous pale tan fleshy cut surface.  Touch   preparations are prepared, air dried and fixed, portion of specimen is   submitted in RPMI for possible lymphoma workup [facility],   ([facility], [place], [place] ).  The remainder is entirely submitted.   (Dictated by: Marianne Godfrey MD 3/15/2016 04:45 PM)     MICROSCOPIC:     Specimen consists of portion of lacrimal gland with acute and chronic   inflammation.  Focal area of microabscess formation associated with   small areas of necrosis are also present.  Inflammation is seen   extending to the periorbital adipose tissue forming panniculitis.   Specimen is negative for malignancy.  Samples sent for immunophenotyping   to [facility], ([facility], [place], [place] ) reveals no evidence   of monoclonality or aberrant antigen expression.  A GMS (fungal) stain   is pending and results will be reported as an addendum.     CPT Codes:   A: 06519-LS1, 65142-ZFFCB, SOH, 83460-DNXBZ, 64056-UIFI     TESTING LAB LOCATION:   [address]  [address] [phone]     COLLECTION SITE:   [facility]   [facility]    "         Component      Latest Ref Rng 4/29/2016   Nucleated RBCs      0 /100 0   Absolute Neutrophil      1.6 - 8.3 10e9/L 8.9 (H)   Absolute Lymphocytes      0.8 - 5.3 10e9/L 3.2   Absolute Monocytes      0.0 - 1.3 10e9/L 0.8   Absolute Eosinophils      0.0 - 0.7 10e9/L 0.2   Absolute Basophils      0.0 - 0.2 10e9/L 0.1   Abs Immature Granulocytes      0 - 0.4 10e9/L 0.1   Absolute Nucleated RBC       0.0   IGG      695 - 1620 mg/dL 836   IgG1      300 - 856 mg/dL 280 (L)   IgG2      158 - 761 mg/dL 277   IgG3      24 - 192 mg/dL 35   IgG4      11 - 86 mg/dL 16   RNP Antibody IgG      0.0 - 0.9 AI <0.2 . . .   Smith SUNNY Antibody IgG      0.0 - 0.9 AI <0.2 . . .   SSA (Ro) (SUNNY) Antibody, IgG      0.0 - 0.9 AI <0.2 . . .   SSB (La) (SUNNY) Antibody, IgG      0.0 - 0.9 AI <0.2 . . .   Scleroderma Antibody Scl-70 SUNNY IgG      0.0 - 0.9 AI <0.2 . . .   Cholesterol      <200 mg/dL 154   Triglycerides      <150 mg/dL 220 (H)   HDL Cholesterol      >49 mg/dL 42 (L)   LDL Cholesterol Calculated      <100 mg/dL 67   Non HDL Cholesterol      <130 mg/dL 111   M Tuberculosis Result      NEG Negative   M Tuberculosis Antigen Value       0.00   Albumin      3.4 - 5.0 g/dL 4.3   ALT      0 - 50 U/L 30   AST      0 - 45 U/L 10   Complement C3      76 - 169 mg/dL 157   Complement C4      15 - 50 mg/dL 32   CRP Inflammation      0.0 - 8.0 mg/L <2.9   Sed Rate      0 - 20 mm/h 2   DNA-ds      <10 IU/mL 1   Cyclic Citrullinated Peptide Antibody, IgG      <7 U/mL 1   Rheumatoid Factor      <20 IU/mL <20   Proteinase 3 Antibody IgG      0.0 - 0.9 AI <0.2 . . .   Myeloperoxidase Antibody IgG      0.0 - 0.9 AI 2.5 (H)   Vitamin D Deficiency screening      20 - 75 ug/L 24   Hemoglobin A1C      4.3 - 6.0 % 7.9 (H)   ALLI by IFA IgG       1:40 (A) . . .     Component      Latest Ref Rng & Units 1/16/2021   WBC      4.0 - 11.0 10e9/L    RBC Count      3.8 - 5.2 10e12/L    Hemoglobin      11.7 - 15.7 g/dL    Hematocrit      35.0 - 47.0 %    MCV       78 - 100 fl    MCH      26.5 - 33.0 pg    MCHC      31.5 - 36.5 g/dL    RDW      10.0 - 15.0 %    Platelet Count      150 - 450 10e9/L    Diff Method          % Neutrophils      %    % Lymphocytes      %    % Monocytes      %    % Eosinophils      %    % Basophils      %    % Immature Granulocytes      %    Nucleated RBCs      0 /100    Absolute Neutrophil      1.6 - 8.3 10e9/L    Absolute Lymphocytes      0.8 - 5.3 10e9/L    Absolute Monocytes      0.0 - 1.3 10e9/L    Absolute Eosinophils      0.0 - 0.7 10e9/L    Absolute Basophils      0.0 - 0.2 10e9/L    Abs Immature Granulocytes      0 - 0.4 10e9/L    Absolute Nucleated RBC          IGG      610 - 1,616 mg/dL    IGA      84 - 499 mg/dL    IGM      35 - 242 mg/dL    Creatinine      0.52 - 1.04 mg/dL    GFR Estimate      >60 mL/min/1.73:m2    GFR Estimate If Black      >60 mL/min/1.73:m2    COVID-19 Virus PCR to [facility] - Source       Nasopharyngeal   COVID-19 Virus PCR to [facility] - Result       Not Detected   Neutrophil Cytoplasmic Antibody      <1:10 titer    Neutrophil Cytoplasmic Antibody Pattern          CD19 B Cells      6 - 27 %    Absolute CD19      107 - 698 cells/uL    Myeloperoxidase Antibody IgG      0.0 - 0.9 AI    Proteinase 3 Antibody IgG      0.0 - 0.9 AI    Vitamin D Deficiency screening      20 - 75 ug/L    Sed Rate      0 - 30 mm/h    CRP Inflammation      0.0 - 8.0 mg/L    Albumin      3.4 - 5.0 g/dL    ALT      0 - 50 U/L    AST      0 - 45 U/L      Component      Latest Ref Rng & Units 5/7/2021   WBC      4.0 - 11.0 10e9/L 8.9   RBC Count      3.8 - 5.2 10e12/L 4.89   Hemoglobin      11.7 - 15.7 g/dL 12.7   Hematocrit      35.0 - 47.0 % 39.7   MCV      78 - 100 fl 81   MCH      26.5 - 33.0 pg 26.0 (L)   MCHC      31.5 - 36.5 g/dL 32.0   RDW      10.0 - 15.0 % 13.7   Platelet Count      150 - 450 10e9/L 336   Diff Method       Automated Method   % Neutrophils      % 65.3   % Lymphocytes      % 20.7   % Monocytes      % 7.8   % Eosinophils       % 5.3   % Basophils      % 0.7   % Immature Granulocytes      % 0.2   Nucleated RBCs      0 /100 0   Absolute Neutrophil      1.6 - 8.3 10e9/L 5.8   Absolute Lymphocytes      0.8 - 5.3 10e9/L 1.8   Absolute Monocytes      0.0 - 1.3 10e9/L 0.7   Absolute Eosinophils      0.0 - 0.7 10e9/L 0.5   Absolute Basophils      0.0 - 0.2 10e9/L 0.1   Abs Immature Granulocytes      0 - 0.4 10e9/L 0.0   Absolute Nucleated RBC       0.0   IGG      610 - 1,616 mg/dL 649   IGA      84 - 499 mg/dL 199   IGM      35 - 242 mg/dL 36   Creatinine      0.52 - 1.04 mg/dL 0.96   GFR Estimate      >60 mL/min/1.73:m2 66   GFR Estimate If Black      >60 mL/min/1.73:m2 77   COVID-19 Virus PCR to [facility] - Source          COVID-19 Virus PCR to [facility] - Result          Neutrophil Cytoplasmic Antibody      <1:10 titer <1:10   Neutrophil Cytoplasmic Antibody Pattern       The ANCA IFA is <1:10.  No further testing will be performed.   CD19 B Cells      6 - 27 % <1 (L)   Absolute CD19      107 - 698 cells/uL <1 (L)   Myeloperoxidase Antibody IgG      0.0 - 0.9 AI 1.1 (H)   Proteinase 3 Antibody IgG      0.0 - 0.9 AI <0.2   Vitamin D Deficiency screening      20 - 75 ug/L 41   Sed Rate      0 - 30 mm/h 9   CRP Inflammation      0.0 - 8.0 mg/L <2.9   Albumin      3.4 - 5.0 g/dL 4.1   ALT      0 - 50 U/L 28   AST      0 - 45 U/L 18     Lab on 07/08/2022   Component Date Value Ref Range Status     Sodium 07/08/2022 143  133 - 144 mmol/L Final     Potassium 07/08/2022 3.9  3.4 - 5.3 mmol/L Final     Chloride 07/08/2022 108  94 - 109 mmol/L Final     Carbon Dioxide (CO2) 07/08/2022 25  20 - 32 mmol/L Final     Anion Gap 07/08/2022 10  3 - 14 mmol/L Final     Urea Nitrogen 07/08/2022 17  7 - 30 mg/dL Final     Creatinine 07/08/2022 1.01  0.52 - 1.04 mg/dL Final     Calcium 07/08/2022 9.3  8.5 - 10.1 mg/dL Final     Glucose 07/08/2022 103 (A) 70 - 99 mg/dL Final     GFR Estimate 07/08/2022 65  >60 mL/min/1.73m2 Final    Effective December 21, 2021  eGFRcr in adults is calculated using the 2021 CKD-EPI creatinine equation which includes age and gender (Ryan et al., [facility], [place], DOI: 10.1056/KFSTgb4719861)     WBC Count 07/08/2022 12.0 (A) 4.0 - 11.0 10e3/uL Final     RBC Count 07/08/2022 4.94  3.80 - 5.20 10e6/uL Final     Hemoglobin 07/08/2022 12.6  11.7 - 15.7 g/dL Final     Hematocrit 07/08/2022 39.6  35.0 - 47.0 % Final     MCV 07/08/2022 80  78 - 100 fL Final     MCH 07/08/2022 25.5 (A) 26.5 - 33.0 pg Final     MCHC 07/08/2022 31.8  31.5 - 36.5 g/dL Final     RDW 07/08/2022 13.6  10.0 - 15.0 % Final     Platelet Count 07/08/2022 350  150 - 450 10e3/uL Final     % Neutrophils 07/08/2022 66  % Final     % Lymphocytes 07/08/2022 22  % Final     % Monocytes 07/08/2022 9  % Final     % Eosinophils 07/08/2022 3  % Final     % Basophils 07/08/2022 0  % Final     % Immature Granulocytes 07/08/2022 0  % Final     NRBCs per 100 WBC 07/08/2022 0  <1 /100 Final     Absolute Neutrophils 07/08/2022 7.9  1.6 - 8.3 10e3/uL Final     Absolute Lymphocytes 07/08/2022 2.7  0.8 - 5.3 10e3/uL Final     Absolute Monocytes 07/08/2022 1.1  0.0 - 1.3 10e3/uL Final     Absolute Eosinophils 07/08/2022 0.3  0.0 - 0.7 10e3/uL Final     Absolute Basophils 07/08/2022 0.1  0.0 - 0.2 10e3/uL Final     Absolute Immature Granulocytes 07/08/2022 0.0  <=0.4 10e3/uL Final     Absolute NRBCs 07/08/2022 0.0  10e3/uL Final     Component      Latest Ref Rng & Units 8/19/2022   WBC      4.0 - 11.0 10e3/uL 12.6 (H)   RBC Count      3.80 - 5.20 10e6/uL 5.06   Hemoglobin      11.7 - 15.7 g/dL 12.8   Hematocrit      35.0 - 47.0 % 40.4   MCV      78 - 100 fL 80   MCH      26.5 - 33.0 pg 25.3 (L)   MCHC      31.5 - 36.5 g/dL 31.7   RDW      10.0 - 15.0 % 14.1   Platelet Count      150 - 450 10e3/uL 387   % Neutrophils      % 71   % Lymphocytes      % 20   % Monocytes      % 6   % Eosinophils      % 3   % Basophils      % 0   % Immature Granulocytes      % 0   NRBCs per 100 WBC      <1 /100 0   Absolute  Neutrophils      1.6 - 8.3 10e3/uL 8.8 (H)   Absolute Lymphocytes      0.8 - 5.3 10e3/uL 2.5   Absolute Monocytes      0.0 - 1.3 10e3/uL 0.8   Absolute Eosinophils      0.0 - 0.7 10e3/uL 0.4   Absolute Basophils      0.0 - 0.2 10e3/uL 0.1   Absolute Immature Granulocytes      <=0.4 10e3/uL 0.1   Absolute NRBCs      10e3/uL 0.0   Color Urine      Colorless, Straw, Light Yellow, Yellow Straw   Appearance Urine      Clear Clear   Glucose Urine      Negative mg/dL 1000 (A)   Bilirubin Urine      Negative Negative   Ketones Urine      Negative mg/dL Negative   Specific Gravity Urine      1.003 - 1.035 1.020   Blood Urine      Negative Negative   pH Urine      5.0 - 7.0 5.0   Protein Albumin Urine      Negative mg/dL Negative   Urobilinogen mg/dL      Normal, 2.0 mg/dL Normal   Nitrite Urine      Negative Negative   Leukocyte Esterase Urine      Negative Negative   RBC Urine      <=2 /HPF 1   WBC Urine      <=5 /HPF 5   Squamous Epithelial /HPF Urine      <=1 /HPF <1   MPO Marline IgG Instrument Value      <3.5 U/mL 0.7   Myeloperoxidase Antibody IgG      Negative Negative   Proteinase 3 Marline IgG Instrument Value      <2.0 U/mL <1.0   Proteinase 3 Antibody IgG      Negative Negative   Protein Random Urine      g/L 0.11   Protein Total Urine g/gr Creatinine      0.00 - 0.20 g/g Cr 0.09   Creatinine Urine      mg/dL 128   IGG      610 - 1,616 mg/dL 641   IGA      84 - 499 mg/dL 204   IGM      35 - 242 mg/dL 32 (L)   Creatinine      0.52 - 1.04 mg/dL 1.24 (H)   GFR Estimate      >60 mL/min/1.73m2 51 (L)   CD19 B Cells      6 - 27 % <1 (L)   Absolute CD19      107 - 698 cells/uL <1 (L)   Neutrophil Cytoplasmic Antibody      <1:10 <1:10   Neutrophil Cytoplasmic Antibody Pattern       The ANCA IFA is <1:10.  No further testing will be performed.   AST      0 - 45 U/L 16   ALT      0 - 50 U/L 34   Albumin      3.4 - 5.0 g/dL 4.2   CRP Inflammation      0.0 - 8.0 mg/L 9.1 (H)   Sed Rate      0 - 30 mm/hr 15   Vitamin D Deficiency  screening      20 - 75 ug/L 36     Component      Latest Ref Rng & Units 1/19/2023   WBC      4.0 - 11.0 10e3/uL 16.1 (H)   RBC Count      3.80 - 5.20 10e6/uL 5.39 (H)   Hemoglobin      11.7 - 15.7 g/dL 13.4   Hematocrit      35.0 - 47.0 % 41.9   MCV      78 - 100 fL 78   MCH      26.5 - 33.0 pg 24.9 (L)   MCHC      31.5 - 36.5 g/dL 32.0   RDW      10.0 - 15.0 % 15.4 (H)   Platelet Count      150 - 450 10e3/uL 383   % Neutrophils      % 79   % Lymphocytes      % 16   % Monocytes      % 4   % Eosinophils      % 1   % Basophils      % 0   % Immature Granulocytes      % 0   NRBCs per 100 WBC      <1 /100 0   Absolute Neutrophils      1.6 - 8.3 10e3/uL 12.6 (H)   Absolute Lymphocytes      0.8 - 5.3 10e3/uL 2.6   Absolute Monocytes      0.0 - 1.3 10e3/uL 0.7   Absolute Eosinophils      0.0 - 0.7 10e3/uL 0.2   Absolute Basophils      0.0 - 0.2 10e3/uL 0.1   Absolute Immature Granulocytes      <=0.4 10e3/uL 0.1   Absolute NRBCs      10e3/uL 0.0   Sodium      136 - 145 mmol/L 137   Potassium      3.4 - 5.3 mmol/L 4.0   Chloride      98 - 107 mmol/L 98   Carbon Dioxide (CO2)      22 - 29 mmol/L 25   Anion Gap      7 - 15 mmol/L 14   Urea Nitrogen      6.0 - 20.0 mg/dL 23.6 (H)   Creatinine      0.51 - 0.95 mg/dL 1.09 (H)   Calcium      8.6 - 10.0 mg/dL 10.3 (H)   Glucose      70 - 99 mg/dL 232 (H)   Alkaline Phosphatase      35 - 104 U/L 95   AST      10 - 35 U/L 23   ALT      10 - 35 U/L 26   Protein Total      6.4 - 8.3 g/dL 7.7   Albumin      3.5 - 5.2 g/dL 4.9   Bilirubin Total      <=1.2 mg/dL 0.3   GFR Estimate      >60 mL/min/1.73m2 59 (L)   Color Urine      Colorless, Straw, Light Yellow, Yellow Light Yellow   Appearance Urine      Clear Clear   Glucose Urine      Negative mg/dL >=1000 (A)   Bilirubin Urine      Negative Negative   Ketones Urine      Negative mg/dL 10 (A)   Specific Gravity Urine      1.003 - 1.035 1.033   Blood Urine      Negative Negative   pH Urine      5.0 - 7.0 6.0   Protein Albumin Urine       Negative mg/dL Negative   Urobilinogen mg/dL      Normal, 2.0 mg/dL Normal   Nitrite Urine      Negative Negative   Leukocyte Esterase Urine      Negative Negative   Iron      37 - 145 ug/dL 51   Iron Binding Capacity      240 - 430 ug/dL 362   Iron Sat Index      15 - 46 % 14 (L)   Parathyroid Hormone Intact      15 - 65 pg/mL 51   Calcium Ionized Whole Blood      4.4 - 5.2 mg/dL 4.9   Ferritin      11 - 328 ng/mL 70   TSH      0.30 - 4.20 uIU/mL 0.40     ROS: A comprehensive ROS was done. Positives are per HPI.      HISTORY REVIEW:  Past Medical History:   Diagnosis Date     Abnormal glandular Papanicolaou smear of cervix 1992     Allergic rhinitis     Allergy, airborne subst     Arthritis      ASCVD (arteriosclerotic cardiovascular disease)      Chronic pain      Chronic pancreatitis (H)     idiopathic, Tx: PPI, antioxidants, pancreatic enzymes     Common migraine      Coronary artery disease      Costochondritis      Difficulty in walking(719.7)      Dyspnea on exertion      Ectasia, mammary duct     [facility], persistent nipple discharge     Elevated fasting glucose      Gastro-oesophageal reflux disease      Granulomatosis with polyangiitis (H)      Hernia      History of angina      Hyperlipidemia LDL goal < 100      Hypertension goal BP (blood pressure) < 140/90     Essential hypertension     Iron deficiency anemia      Ischemic cardiomyopathy      Menorrhagia      Migraine headaches      Mild persistent asthma      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 Matias, neg w/u, Rx'd w/prednisone     Ventral hernia, unspecified, without mention of obstruction or gangrene 2012     Past Surgical History:   Procedure Laterality Date     C/SECTION, LOW TRANSVERSE  1996         CARDIAC SURGERY      cardiac stent- recent in 16; 4 other stents     DILATION AND CURETTAGE N/A 2016    Procedure: DILATION AND CURETTAGE;  Surgeon: Nahed Butler MD;  Location: [facility]     ESOPHAGOSCOPY, GASTROSCOPY, DUODENOSCOPY (EGD), COMBINED N/A 2022    Procedure: ESOPHAGOGASTRODUODENOSCOPY, WITH BIOPSY;  Surgeon: Enzo Sesay MD;  Location:  GI     HC UGI ENDOSCOPY W EUS  2008    Dr. Pastrana, possible chronic pancreatitis, fatty liver     HERNIA REPAIR  2012    done at [facility]     INSERT INTRAUTERINE DEVICE N/A 2016    Procedure: INSERT INTRAUTERINE DEVICE;  Surgeon: Nahed Butler MD;  Location: [facility] OR     INT UTERINE FIBRIOD EMBOLIZATION  10/29/2014     LAPAROSCOPIC CHOLECYSTECTOMY  2008    Dr. Arnol GRUBBS TX, CERVICAL  1992    s/p HUMERA, done at [facility] in [place].     ORBITOTOMY Right 03/15/2016    Procedure: ORBITOTOMY;  Surgeon: Myron Cyr MD;  Location: [facility]     ORBITOTOMY Right 2017    Procedure: ORBITOTOMY;  RIGHT ORBITOTOMY AND BIOPSY;  Surgeon: Charis Holbrook MD;  Location: [facility]     REPAIR PTOSIS Right 2017    Procedure: REPAIR PTOSIS;  RIGHT UPPER LID PTOSIS REPAIR;  Surgeon: Myron Cyr MD;  [facility]     UPPER GI ENDOSCOPY  10/21/2008    mild gastritis, Dr. Rocky CALDERA ECHO HEART XTHORACIC,COMPLETE, W/O DOPPLER  2004    [facility] WNL, EF 60%     Family History   Problem Relation Age of Onset     Heart Disease Father  50        heart attack     Cerebrovascular Disease Father      Diabetes Father      Hypertension Father      Depression Father      C.A.D. Father      Hypertension Mother      Arthritis Mother      Heart Disease Mother         long qt syndrome     Thyroid Disease Mother      C.A.D. Mother      Heart Disease Brother 15        MI at 15, 16.      Diabetes Maternal Uncle      Hypertension Maternal Aunt      Hypertension Maternal Uncle      Arthritis Brother         he passed away, had severe arthritis at age 11     Arthritis Maternal Uncle      Eye Disorder Maternal Uncle         cataracts     Neurologic Disorder Sister         migraines     Neurologic Disorder Sister         migraines     Respiratory Son         asthma     Cerebrovascular Disease Maternal Uncle      C.A.D. Brother      Family History Negative Other         neg for RA, SLE     Unknown/Adopted No family hx of         unknown neurological issues in her family, mother was adopted     Skin Cancer No family hx of         No known family hx of skin cancer     Social History     Socioeconomic History     Marital status: Single     Spouse name: Not on file     Number of children: 1     Years of education: Not on file     Highest education level: Not on file   Occupational History     Employer: NONE    Tobacco Use     Smoking status: Some Days     Packs/day: 0.20     Years: 1.00     Pack years: 0.20     Types: Cigarettes     Last attempt to quit: 2016     Years since quittin.9     Smokeless tobacco: Never   Substance and Sexual Activity     Alcohol use: No     Alcohol/week: 0.0 standard drinks     Drug use: No     Sexual activity: Not Currently   Other Topics Concern     Parent/sibling w/ CABG, MI or angioplasty before 65F 55M? Yes   Social History Narrative    Balanced Diet - sometimes    Osteoporosis Prevention Measures - Dairy servings per day: 2 servings weekly    Regular Exercise -  Yes Describe walking 4 times a week    Dental Exam - NO     Seatbelts used - Yes    Self Breast Exam - Yes    Abuse: Current or Past (Physical, Sexual or Emotional)- No    Do you have any concerns about STD's -  No    Do you feel safe in your environment - No    Guns stored in the home - No    Sunscreen used - Yes    Lipids -  YES - Date: 053102    Glucose -  YES - Date: 012804    Eye Exam - YES - Date: one year ago    Colon Cancer Screening - No    Hemoccults - NO    Pap Test -  YES - Date: 070904, remote history of LEEP    Mammography - YES - Date: last spring, would like to discuss, needs a referral to [facility] [place]    DEXA - NO    Immunizations reviewed and up to date - Yes, last td given in 1997 or 1998     Social Determinants of Health     Financial Resource Strain: Not on file   Food Insecurity: Not on file   Transportation Needs: Not on file   Physical Activity: Not on file   Stress: Not on file   Social Connections: Not on file   Intimate Partner Violence: Not on file   Housing Stability: Not on file     Patient Active Problem List   Diagnosis     Seasonal affective disorder (H)     Allergic rhinitis     PCOS (polycystic ovarian syndrome)     Moderate persistent asthma     Chronic pancreatitis (H)     Hypertension goal BP (blood pressure) < 140/90     Common migraine     NSTEMI (non-ST elevated myocardial infarction) (H)     Hyperlipidemia LDL goal <70     ASCVD (arteriosclerotic cardiovascular disease)     GERD (gastroesophageal reflux disease)     Ischemic cardiomyopathy     Hypertensive heart disease     Uterine leiomyoma     Iron deficiency anemia     Costochondritis     Vitamin D deficiency     Breast cancer screening     Spinal stenosis in cervical region     Fibromyalgia     Seronegative rheumatoid arthritis (H)     Type 2 diabetes, HbA1C goal < 8% (H)     Type 2 diabetes mellitus with other specified complication (H)     Hyperlipidemia associated with type 2 diabetes mellitus (H)     Hypertension associated with diabetes (H)     Overweight with  body mass index (BMI) 25.0-29.9     Tobacco use disorder     Telogen effluvium     CAD S/P percutaneous coronary angioplasty     Status post coronary angiogram     ANCA-associated vasculitis     Wegener's vasculitis     Type 1 diabetes mellitus with complications (H)     Chest discomfort     Urinary frequency     Abdominal pain, epigastric     Hypokalemia     Leukocytosis, unspecified type     Acute pancreatitis, unspecified complication status, unspecified pancreatitis type       Pregnancy Hx: She is . All miscarriages were in first trimester. H/o OC use in the past. 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    PMHx, FHx, SHx were reviewed, unchanged.    Outpatient Encounter Medications as of 2023   Medication Sig Dispense Refill     acetaminophen (TYLENOL) 325 MG tablet Take 1-2 tablets (325-650 mg) by mouth every 6 hours as needed for pain (headache) 250 tablet 4     ADVAIR DISKUS 250-50 MCG/ACT inhaler        albuterol (2.5 MG/3ML) 0.083% neb solution INL 1 VIAL VIA NEBULIZATION Q 4 TO 6 HOURS PRN  1     albuterol (PROAIR HFA, PROVENTIL HFA, VENTOLIN HFA) 108 (90 BASE) MCG/ACT inhaler Inhale 2 puffs into the lungs every 6 hours as needed for shortness of breath / dyspnea or wheezing 3 Inhaler 1     azelastine (ASTELIN) 0.1 % nasal spray USE 1 TO 2 SPRAYS IN EACH NOSTRIL TWICE DAILY AS NEEDED (Patient not taking: Reported on 2023)       BANOPHEN 25 MG tablet Take 25 mg by mouth At Bedtime       blood glucose (NO BRAND SPECIFIED) lancets standard Use to test blood sugar 1-4 times daily or as directed. 400 each 3     blood glucose (NO BRAND SPECIFIED) test strip Use to test blood sugar 1-4 times daily or as directed. 400 strip 3     Blood Pressure Monitor KIT 1 each daily Monitor home blood pressure as instructed by physician.  Dispense [place] blood pressure kit. 1 kit 0     calcium carbonate (TUMS) 500 MG chewable tablet Take 3-4 tablets (1,500-2,000 mg) by mouth daily as needed 90  tablet 3     clopidogrel (PLAVIX) 75 MG tablet Take 1 tablet (75 mg) by mouth daily . 30-day refills only. 30 tablet 11     COMPRESSION STOCKINGS 2 each daily Apply one 10-15 mmHg compression stocking to each lower extgmierty during the day and remove before bedtime. 4 each 2     cyclobenzaprine (FLEXERIL) 10 MG tablet Take 1 tablet (10 mg) by mouth 2 times daily as needed for muscle spasms 60 tablet 3     dicyclomine (BENTYL) 20 MG tablet TAKE 1 TABLET(20 MG) BY MOUTH FOUR TIMES DAILY AS NEEDED. 60 tablet 0     diphenhydrAMINE (BENADRYL) 25 MG capsule Take 1 capsule (25 mg) by mouth nightly as needed for itching or allergies 30 capsule 2     empagliflozin (JARDIANCE) 10 MG TABS tablet Take 1 tablet (10 mg) by mouth daily 90 tablet 3     EPIPEN 2-RIKY 0.3 MG/0.3ML injection INJECT 0.3 MG INTO THE MUSCLE PRF ANAPHYALAXIS  0     Ergocalciferol (VITAMIN D2) 50 MCG (2000 UT) TABS Take 2,000 Units by mouth daily (Patient not taking: Reported on 1/19/2023) 90 tablet 1     esomeprazole (NEXIUM) 40 MG DR capsule Take 1 capsule (40 mg) by mouth 2 times daily Take 30-60 minutes before eating. 180 capsule 0     estradiol (VAGIFEM) 10 MCG TABS vaginal tablet Place 1 tablet (10 mcg) vaginally twice a week 8 tablet 11     famotidine (PEPCID) 20 MG tablet Take 1 tablet (20 mg) by mouth 2 times daily as needed (abdominal pain) 20 tablet 0     fexofenadine (ALLEGRA) 180 MG tablet Take 1 tablet by mouth daily as needed. 90 tablet 3     fluconazole (DIFLUCAN) 200 MG tablet Take 1 tablet (200 mg) by mouth daily 7 tablet 0     fluticasone (FLONASE) 50 MCG/ACT nasal spray SHAKE LIQUID AND USE 1 TO 2 SPRAYS IN EACH NOSTRIL EVERY DAY       folic acid (FOLVITE) 1 MG tablet Take 1 tablet (1 mg) by mouth daily 90 tablet 2     insulin glargine (LANTUS PEN) 100 UNIT/ML pen Inject 75 Units Subcutaneous every morning Or as directed. 75 mL 3     insulin pen needle (BD MARCK U/F) 32G X 4 MM miscellaneous USE DAILY OR AS DIRECTED 300 each 3      ketoconazole (NIZORAL) 2 % shampoo Apply topically daily as needed       levocetirizine (XYZAL) 5 MG tablet Take 5 mg by mouth daily       levofloxacin (LEVAQUIN) 500 MG tablet Take 1 tablet (500 mg) by mouth daily 7 tablet 0     levonorgestrel (MIRENA) 20 MCG/DAY IUD 1 each by Intrauterine route once       lisinopril-hydrochlorothiazide (ZESTORETIC) 20-25 MG tablet Take 1 tablet by mouth daily . 30-day refills only. 30 tablet 11     magnesium 250 MG tablet TAKE 1 TABLET(250 MG) BY MOUTH TWICE DAILY 60 tablet 3     metFORMIN (GLUCOPHAGE-XR) 500 MG 24 hr tablet Take 4 tablets (2,000 mg) by mouth daily (with dinner) 120 tablet 11     metoprolol succinate ER (TOPROL XL) 100 MG 24 hr tablet Take 1 tablet (100 mg) by mouth daily . 30-day refills only. 30 tablet 11     Multiple Vitamin (DAILY-GERALD MULTIVITAMIN) TABS Take 1 tablet by mouth daily 90 tablet 1     nitroGLYcerin (NITROSTAT) 0.4 MG sublingual tablet Place 1 tablet (0.4 mg) under the tongue every 5 minutes as needed for chest pain . After 3 doses, if pain persists call 911. 25 tablet 3     olopatadine (PATANOL) 0.1 % ophthalmic solution        pravastatin (PRAVACHOL) 40 MG tablet Take 1 tablet (40 mg) by mouth daily . 30-day refills only. 30 tablet 11     prochlorperazine (COMPAZINE) 5 MG tablet Take 1 tablet (5 mg) by mouth every 6 hours as needed for nausea or vomiting 60 tablet 3     sennosides (SENOKOT) 8.6 MG tablet 1-2 tabs a day as needed for constipation 60 tablet 1     spironolactone (ALDACTONE) 25 MG tablet Take 1 tablet (25 mg) by mouth daily . Take one additional 0.5 tab daily as needed for weight gain. 45 day refills. 45 tablet 11     sucralfate (CARAFATE) 1 GM tablet Take 1 tablet (1 g) by mouth 4 times daily 120 tablet 3     terbinafine (LAMISIL) 1 % external cream Apply topically 2 times daily 42 g 1     terconazole (TERAZOL 3) 80 MG vaginal suppository Place 1 suppository (80 mg) vaginally At Bedtime (Patient not taking: Reported on 1/19/2023) 3  suppository 1     traMADol (ULTRAM) 50 MG tablet TAKE 1 TABLET(50 MG) BY MOUTH EVERY 8 HOURS AS NEEDED FOR SEVERE PAIN 60 tablet 3     vitamin B-2 (RIBOFLAVIN) 100 MG TABS tablet Take 2 tablets (200 mg) by mouth daily (Patient not taking: Reported on 1/19/2023) 30 tablet 11     vitamin D2 (ERGOCALCIFEROL) 66232 units (1250 mcg) capsule Take 1 capsule (50,000 Units) by mouth once a week 12 capsule 0     No facility-administered encounter medications on file as of 1/25/2023.       Allergies   Allergen Reactions     Amoxicillin-Pot Clavulanate      Artificial Sweetner (Informational Only)  Other (See Comments)     Increased headache     Augmentin      Unknown possible hives and edema     Azithromycin      Diatrizoate Other (See Comments)     Pt wants this listed because she is allergic to shellfish      Imitrex [Sumatriptan]      Severe face/neck/chest tightness and flushing side effects      Penicillins Hives     Unknown      Pork Allergy      Stomach pain, cramping, diarrhea, itching, nausea and headaches     Shellfish Allergy Hives and Swelling     Sulfa Drugs Hives and Swelling     Zithromax [Azithromycin Dihydrate] Swelling     Unknown        Ph.E:    No resp distress on talking over the phone    Assessment/Plan:    1-Seropositive non-erosive RA (arthritis, AM stiffness, high CRP, RF 26 but re-check neg 3/2015 on HCQ) Dx 5/2013, FMS, new pleuritic CP 3/20-15-5/2015 resolved on steroids. She is on  mg bid since 5/2014. She tolerates it well and it's helping some but not enough to control all her pain. Continued having flare ups of joint pain, could be GPA related. Some pain is due to FMS.My impression is that her arthralgias are a combination of RA/ IA sec GPA, fibromyalgia and chronic pain.     On 4/29/2016, presented with new R orbital inflammatory mass, biopsy showed panniculitis with no infection or malignancy, it's very responsive to high dose steroid and recured as soon as patient tapered prednisone  off. Prednisone was not a good option for her given weight gain, diabetes. She tried and did not tolerate MTX, AZA.    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 Repeat MPO was positive in 6/2017.    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 Shah and Dr. Valdez at [facility]. 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     She is on rituximab since 12/12/2018 with great response, minor allergic reaction which could be managed by pre-meds and extra steroid dose. Developed high BG and vaginal yeast infections after solumedrol premed. Treated candida with fluconazole. Will decrease solumerdol dose to IV 80 mg prior to receiving rituximab. 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 However, feels Rituximab effects wear off around 4 months. According to ACR guidelines can give every 4-6 months. Pt elected to received this upcoming September which will be approximately 5 months from last dose. Repeat Orbit CT in September 2021 demonstrated improvement.     Last visit in April 2022-- Recommended marie given b cell depletion tx as rituximab blocks the response to covid vaccine, she is concerned about side effects. I advised her to discuss with [facility] pharmacist.      Today 1/25/2023: Janine has been ill since Dx of COVID on 12/17/2022. Her most recent labs were reviewed, stable vasculitis labs in 8/2022 with CD19<1, neg vasculitis markers. In 1/2023, no anemia and nl thyroid function test. I ordered vit D as add on. 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 She does need to follow up on abnormal thyroid US and mammogram as well. Our plan for ANCA vasculitis was to give rituximab 1 gram g1mwhvor as benefit did not last 6 months with last rituximab on 9/7/2022, but today we both agreed to give next dose in March after 6 months  to let her body heal from COVID. I will see her in person, in early march to determine if it is ok to give another dose of rituxiamb. 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 Also ordered shower chair, commode per her request given significant fatigue, body pain.    2-Fat pads. She was seen by endocrinology and cushing was ruled out in 4/2014. Was advised to do f/u for enlarged thyroid/thyroid nodules.  3-Hair loss. Continue with dermatology  4-Chronic pain. F/U with pain clinic  5-Vit D deficiency. Was replaced with vit D 50, 000 units po qwk x 12 wk then 2000 units every day  6-Discussed COVID vaccination, timing with rituximab to get max benefit, she had severe sore arm.  7-?HCQ toxicity on OCT 7/2021, now off HCQ, could explain increased arthralgia  8- Chronic Headaches. Symptoms worsen after taking zofran. Has received compazine in hospital in the past without adverse effect. Will have patient trial Compazine       Today's plan:    Plan to repeat rituximab 1 gram in March, when you feel better. Plan for less steroid with rituximab infusions (solumedrol 80 mg IV)    X-ray of C spine and labs on 2/10    Shower bath chair, commode order to be mailed    Return in person 3/9 at noon add on      Orders Placed This Encounter   Procedures     X-ray Cervical spine 2-3 vws     Myeloperoxidase and Proteinase 3 Panel     CRP inflammation     Erythrocyte sedimentation rate auto     CD19 B Cell Count     ANCA IgG by IFA with Reflex to Titer     Immunoglobulins A G and M     Vitamin D Deficiency     Miscellaneous Order for DME - ONLY FOR DME     Miscellaneous Order for DME - ONLY FOR DME       Majo Mckinnon MD

## 2023-01-25 NOTE — TELEPHONE ENCOUNTER
Call to patient to confirm that I am mailing the DME orders to her.  Confirmed appointment with patient on 3/9 at 12:00, will get Dr. Mckinnon's template open and appointment scheduled.    ISHA PhillipsN, RN  RN Care Coordinator Rheumatology

## 2023-01-25 NOTE — PATIENT INSTRUCTIONS
Plan to repeat rituximab 1 gram in March, when you feel better    X-ray of C spine and labs on 2/10    Shower bath chair, commode order to be mailed    Return in person 3/9 at noon add on

## 2023-01-31 ENCOUNTER — ANCILLARY PROCEDURE (OUTPATIENT)
Dept: MAMMOGRAPHY | Facility: CLINIC | Age: 58
End: 2023-01-31
Attending: FAMILY MEDICINE
Payer: COMMERCIAL

## 2023-01-31 DIAGNOSIS — R92.8 ABNORMAL MAMMOGRAM OF LEFT BREAST: ICD-10-CM

## 2023-01-31 DIAGNOSIS — M54.2 NECK PAIN: Primary | ICD-10-CM

## 2023-01-31 PROCEDURE — G0279 TOMOSYNTHESIS, MAMMO: HCPCS | Mod: LT | Performed by: STUDENT IN AN ORGANIZED HEALTH CARE EDUCATION/TRAINING PROGRAM

## 2023-01-31 PROCEDURE — 76642 ULTRASOUND BREAST LIMITED: CPT | Mod: LT | Performed by: STUDENT IN AN ORGANIZED HEALTH CARE EDUCATION/TRAINING PROGRAM

## 2023-01-31 PROCEDURE — 77065 DX MAMMO INCL CAD UNI: CPT | Mod: LT | Performed by: STUDENT IN AN ORGANIZED HEALTH CARE EDUCATION/TRAINING PROGRAM

## 2023-02-01 DIAGNOSIS — M19.90 INFLAMMATORY ARTHRITIS: ICD-10-CM

## 2023-02-02 ENCOUNTER — PATIENT OUTREACH (OUTPATIENT)
Dept: CARE COORDINATION | Facility: CLINIC | Age: 58
End: 2023-02-02
Payer: COMMERCIAL

## 2023-02-02 DIAGNOSIS — M19.90 INFLAMMATORY ARTHRITIS: ICD-10-CM

## 2023-02-02 NOTE — PROGRESS NOTES
Clinic Care Coordination Contact  [facility]/Voicemail     Clinical Data:  CC Outreach  Outreach attempted on 02/02/23; total outreach attempts x 2:    CC left message on Janine's voicemail with call back information and requested return call.   Status: Patient is on  CC panel, status as identified.   Plan: [facility] to continue outreach attempts.        SKY Rangel ([place])  , Care Coordination  [facility]  [facility]  [facility]  [email or web address]   Office: [phone]

## 2023-02-04 RX ORDER — MULTIVITAMIN WITH FOLIC ACID 400 MCG
1 TABLET ORAL DAILY
Qty: 90 TABLET | Refills: 1 | Status: SHIPPED | OUTPATIENT
Start: 2023-02-04 | End: 2023-12-21

## 2023-02-04 NOTE — TELEPHONE ENCOUNTER
TAB-A-GERALD TABLETS  Last Written Prescription Date:   8/19/2022  Last Fill Quantity: 90,   # refills: 1  Last Office Visit :  1/25/2023  Future Office visit:   3/9/2023  90 Tabs, 1 Refill sent to hakan Mercedes RN  Central Triage Red Flags/Med Refills

## 2023-02-06 RX ORDER — FOLIC ACID 1 MG/1
1 TABLET ORAL DAILY
Qty: 90 TABLET | Refills: 3 | Status: SHIPPED | OUTPATIENT
Start: 2023-02-06 | End: 2023-12-07

## 2023-02-08 NOTE — TELEPHONE ENCOUNTER
RECORDS RECEIVED FROM: internal    DATE RECEIVED: 5.2.23    NOTES (FOR ALL VISITS) STATUS DETAILS   OFFICE NOTES from referring provider internal   Kash Solano MD   OFFICE NOTES from other specialist internal  8/19/22 [place]      ED NOTES internal  3.4.22 I[place]    OPERATIVE REPORT  (thyroid, pituitary, adrenal, parathyroid)     MEDICATION LIST internal     IMAGING        XR (Chest) internal  1.19.23   CT (HEAD/NECK/CHEST/ABDOMEN) internal  2/8/22, 5.25.21      ULTRASOUND (HEAD/NECK) internal  1.19.23   LABS     DIABETES: HBGA1C, CREATININE, FASTING LIPIDS, MICROALBUMIN URINE, POTASSIUM, TSH, T4    THYROID: TSH, T4, CBC, THYRODLONULIN, TOTAL T3, FREE T4, CALCITONIN, CEA internal

## 2023-02-10 ENCOUNTER — OFFICE VISIT (OUTPATIENT)
Dept: FAMILY MEDICINE | Facility: CLINIC | Age: 58
End: 2023-02-10
Payer: COMMERCIAL

## 2023-02-10 ENCOUNTER — ANCILLARY PROCEDURE (OUTPATIENT)
Dept: CT IMAGING | Facility: CLINIC | Age: 58
End: 2023-02-10
Attending: FAMILY MEDICINE
Payer: COMMERCIAL

## 2023-02-10 ENCOUNTER — ANCILLARY PROCEDURE (OUTPATIENT)
Dept: GENERAL RADIOLOGY | Facility: CLINIC | Age: 58
End: 2023-02-10
Attending: INTERNAL MEDICINE
Payer: COMMERCIAL

## 2023-02-10 ENCOUNTER — LAB (OUTPATIENT)
Dept: LAB | Facility: CLINIC | Age: 58
End: 2023-02-10
Payer: COMMERCIAL

## 2023-02-10 VITALS
OXYGEN SATURATION: 97 % | BODY MASS INDEX: 31.06 KG/M2 | WEIGHT: 168.8 LBS | SYSTOLIC BLOOD PRESSURE: 118 MMHG | DIASTOLIC BLOOD PRESSURE: 75 MMHG | HEIGHT: 62 IN | HEART RATE: 80 BPM

## 2023-02-10 DIAGNOSIS — R10.10 UPPER ABDOMINAL PAIN: Primary | ICD-10-CM

## 2023-02-10 DIAGNOSIS — D72.829 LEUKOCYTOSIS, UNSPECIFIED TYPE: ICD-10-CM

## 2023-02-10 DIAGNOSIS — E04.1 THYROID NODULE: ICD-10-CM

## 2023-02-10 DIAGNOSIS — K62.3 RECTAL PROLAPSE: ICD-10-CM

## 2023-02-10 DIAGNOSIS — I77.82 ANCA-ASSOCIATED VASCULITIS (H): ICD-10-CM

## 2023-02-10 DIAGNOSIS — M54.2 NECK PAIN: ICD-10-CM

## 2023-02-10 DIAGNOSIS — R10.10 UPPER ABDOMINAL PAIN: ICD-10-CM

## 2023-02-10 DIAGNOSIS — R21 RASH: ICD-10-CM

## 2023-02-10 DIAGNOSIS — M17.10 ARTHRITIS OF KNEE: ICD-10-CM

## 2023-02-10 LAB
ALBUMIN SERPL BCG-MCNC: 4.8 G/DL (ref 3.5–5.2)
ALP SERPL-CCNC: 86 U/L (ref 35–104)
ALT SERPL W P-5'-P-CCNC: 30 U/L (ref 10–35)
ANION GAP SERPL CALCULATED.3IONS-SCNC: 16 MMOL/L (ref 7–15)
AST SERPL W P-5'-P-CCNC: 26 U/L (ref 10–35)
BASOPHILS # BLD AUTO: 0 10E3/UL (ref 0–0.2)
BASOPHILS NFR BLD AUTO: 0 %
BILIRUB SERPL-MCNC: 0.2 MG/DL
BUN SERPL-MCNC: 24.6 MG/DL (ref 6–20)
CALCIUM SERPL-MCNC: 10.5 MG/DL (ref 8.6–10)
CHLORIDE SERPL-SCNC: 99 MMOL/L (ref 98–107)
CREAT SERPL-MCNC: 1.07 MG/DL (ref 0.51–0.95)
CRP SERPL-MCNC: 6.67 MG/L
DEPRECATED HCO3 PLAS-SCNC: 23 MMOL/L (ref 22–29)
EOSINOPHIL # BLD AUTO: 0.2 10E3/UL (ref 0–0.7)
EOSINOPHIL NFR BLD AUTO: 2 %
ERYTHROCYTE [DISTWIDTH] IN BLOOD BY AUTOMATED COUNT: 15.3 % (ref 10–15)
ERYTHROCYTE [SEDIMENTATION RATE] IN BLOOD BY WESTERGREN METHOD: 12 MM/HR (ref 0–30)
GFR SERPL CREATININE-BSD FRML MDRD: 61 ML/MIN/1.73M2
GLUCOSE SERPL-MCNC: 205 MG/DL (ref 70–99)
HCT VFR BLD AUTO: 40.7 % (ref 35–47)
HGB BLD-MCNC: 13.2 G/DL (ref 11.7–15.7)
IMM GRANULOCYTES # BLD: 0 10E3/UL
IMM GRANULOCYTES NFR BLD: 0 %
LIPASE SERPL-CCNC: 26 U/L (ref 13–60)
LYMPHOCYTES # BLD AUTO: 2.7 10E3/UL (ref 0.8–5.3)
LYMPHOCYTES NFR BLD AUTO: 23 %
MCH RBC QN AUTO: 24.9 PG (ref 26.5–33)
MCHC RBC AUTO-ENTMCNC: 32.4 G/DL (ref 31.5–36.5)
MCV RBC AUTO: 77 FL (ref 78–100)
MONOCYTES # BLD AUTO: 0.8 10E3/UL (ref 0–1.3)
MONOCYTES NFR BLD AUTO: 6 %
NEUTROPHILS # BLD AUTO: 8.1 10E3/UL (ref 1.6–8.3)
NEUTROPHILS NFR BLD AUTO: 69 %
NRBC # BLD AUTO: 0 10E3/UL
NRBC BLD AUTO-RTO: 0 /100
PLATELET # BLD AUTO: 415 10E3/UL (ref 150–450)
POTASSIUM SERPL-SCNC: 4.1 MMOL/L (ref 3.4–5.3)
PROT SERPL-MCNC: 7.6 G/DL (ref 6.4–8.3)
RBC # BLD AUTO: 5.3 10E6/UL (ref 3.8–5.2)
RETICS # AUTO: 0.07 10E6/UL (ref 0.03–0.1)
RETICS/RBC NFR AUTO: 1.4 % (ref 0.5–2)
SODIUM SERPL-SCNC: 138 MMOL/L (ref 136–145)
WBC # BLD AUTO: 11.9 10E3/UL (ref 4–11)

## 2023-02-10 PROCEDURE — 82784 ASSAY IGA/IGD/IGG/IGM EACH: CPT | Mod: 90 | Performed by: PATHOLOGY

## 2023-02-10 PROCEDURE — 85652 RBC SED RATE AUTOMATED: CPT | Performed by: PATHOLOGY

## 2023-02-10 PROCEDURE — 86036 ANCA SCREEN EACH ANTIBODY: CPT | Mod: 90 | Performed by: PATHOLOGY

## 2023-02-10 PROCEDURE — 86037 ANCA TITER EACH ANTIBODY: CPT | Mod: 90 | Performed by: PATHOLOGY

## 2023-02-10 PROCEDURE — 83690 ASSAY OF LIPASE: CPT | Mod: 90 | Performed by: PATHOLOGY

## 2023-02-10 PROCEDURE — 86140 C-REACTIVE PROTEIN: CPT | Mod: 90 | Performed by: PATHOLOGY

## 2023-02-10 PROCEDURE — 36415 COLL VENOUS BLD VENIPUNCTURE: CPT | Performed by: PATHOLOGY

## 2023-02-10 PROCEDURE — 86355 B CELLS TOTAL COUNT: CPT | Mod: 90 | Performed by: PATHOLOGY

## 2023-02-10 PROCEDURE — 99215 OFFICE O/P EST HI 40 MIN: CPT | Performed by: FAMILY MEDICINE

## 2023-02-10 PROCEDURE — 99000 SPECIMEN HANDLING OFFICE-LAB: CPT | Performed by: PATHOLOGY

## 2023-02-10 PROCEDURE — 83876 ASSAY MYELOPEROXIDASE: CPT | Mod: 90 | Performed by: PATHOLOGY

## 2023-02-10 PROCEDURE — 85045 AUTOMATED RETICULOCYTE COUNT: CPT | Performed by: PATHOLOGY

## 2023-02-10 PROCEDURE — 83516 IMMUNOASSAY NONANTIBODY: CPT | Mod: 90 | Performed by: PATHOLOGY

## 2023-02-10 PROCEDURE — 74176 CT ABD & PELVIS W/O CONTRAST: CPT | Mod: GC | Performed by: RADIOLOGY

## 2023-02-10 PROCEDURE — 99207 BLOOD MORPHOLOGY PATHOLOGIST REVIEW: CPT | Performed by: STUDENT IN AN ORGANIZED HEALTH CARE EDUCATION/TRAINING PROGRAM

## 2023-02-10 PROCEDURE — 80053 COMPREHEN METABOLIC PANEL: CPT | Mod: 90 | Performed by: PATHOLOGY

## 2023-02-10 PROCEDURE — 72040 X-RAY EXAM NECK SPINE 2-3 VW: CPT | Mod: GC | Performed by: RADIOLOGY

## 2023-02-10 PROCEDURE — 85025 COMPLETE CBC W/AUTO DIFF WBC: CPT | Performed by: PATHOLOGY

## 2023-02-10 RX ORDER — BENZOCAINE/MENTHOL 6 MG-10 MG
LOZENGE MUCOUS MEMBRANE 2 TIMES DAILY
Qty: 60 G | Refills: 1 | Status: SHIPPED | OUTPATIENT
Start: 2023-02-10 | End: 2023-02-10

## 2023-02-10 RX ORDER — BENZOCAINE/MENTHOL 6 MG-10 MG
LOZENGE MUCOUS MEMBRANE 2 TIMES DAILY
Qty: 60 G | Refills: 1 | Status: SHIPPED | OUTPATIENT
Start: 2023-02-10 | End: 2024-06-05

## 2023-02-10 NOTE — PROGRESS NOTES
"  Assessment & Plan     Upper abdominal pain  Possible pancreatitis relapse had in past feels somewhat like that  - CBC with platelets and differential; Future  - Comprehensive metabolic panel (BMP + Alb, Alk Phos, ALT, AST, Total. Bili, TP); Future  - Lipase; Future  - CT Abdomen Pelvis w/o Contrast; Future    Rectal prolapse  Not now and not today so no exam done but description sounds c/w   - Adult Colorectal Surgery  Referral; Future    Leukocytosis, unspecified type  Unexplained  - Lab Blood Morphology Pathologist Review; Future    Rash  Seb derm most likley consider butterfly  - hydrocortisone (CORTAID) 1 % external cream; Apply topically 2 times daily    Thyroid nodule  I'm communicating w/ Endo to see if a way to address this and DM at Endo visits        45 minutes spent on the date of the encounter doing chart review, history and exam, documentation and further activities per the note    BMI:   Estimated body mass index is 30.87 kg/m  as calculated from the following:    Height as of this encounter: 1.575 m (5' 2\").    Weight as of this encounter: 76.6 kg (168 lb 12.8 oz).     Return in about 1 month (around 3/10/2023).    Kash Solano MD  [facility] [place]    Adrienne Mehta is a 57 year old, presenting for the following health issues:  Follow Up and Derm Problem      HPI Here in f/u  Ongoing nausea all the time, occasional upper abdomen pain, worse last few days, no clear triggers but she notes sugars rise along w/ worse abd pain even though no extra calorie intakie. No vomit or change bowel habit.  Recent thyroid US rvwd, I suggest speciality opinion as slightly enlarged nodules, she asks if can see Endo MD to manage both that and DM, sees PA for DM now, will enquire on her behalf.  Facial rash chronic/worse, red cheeks nose eyelids ears, some flakey, told in past rosacea this seems different has autoimmune dx but not lupus  Chronic notes w/ stool " something about 2 inches long comes out of rectum has to be pushed back in  Hi wbc recently unclear why  No new c/o  Past Medical History:   Diagnosis Date     Abnormal glandular Papanicolaou smear of cervix 1992     Allergic rhinitis     Allergy, airborne subst     Arthritis      ASCVD (arteriosclerotic cardiovascular disease)      Chronic pain      Chronic pancreatitis (H)     idiopathic, Tx: PPI, antioxidants, pancreatic enzymes     Common migraine      Coronary artery disease      Costochondritis      Difficulty in walking(719.7)      Dyspnea on exertion      Ectasia, mammary duct     [facility], persistent nipple discharge     Elevated fasting glucose      Gastro-oesophageal reflux disease      Granulomatosis with polyangiitis (H)      Hernia      History of angina      Hyperlipidemia LDL goal < 100      Hypertension goal BP (blood pressure) < 140/90     Essential hypertension     Iron deficiency anemia      Ischemic cardiomyopathy      Menorrhagia      Migraine headaches      Mild persistent asthma      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 Matias, neg w/u, Rx'd w/prednisone     Ventral hernia, unspecified, without mention of obstruction or gangrene 07/2012     Past Surgical History:   Procedure Laterality Date     C/SECTION, LOW TRANSVERSE   1996         CARDIAC SURGERY      cardiac stent- recent in 16; 4 other stents     DILATION AND CURETTAGE N/A 2016    Procedure: DILATION AND CURETTAGE;  Surgeon: Nahed Butler MD;  [facility]: [facility]     ESOPHAGOSCOPY, GASTROSCOPY, DUODENOSCOPY (EGD), COMBINED N/A 2022    Procedure: ESOPHAGOGASTRODUODENOSCOPY, WITH BIOPSY;  Surgeon: Enzo Sesay MD;  [facility]     HC UGI ENDOSCOPY W EUS  2008    Dr. Pastrana, possible chronic pancreatitis, fatty liver     HERNIA REPAIR  2012    done at [facility]     INSERT INTRAUTERINE DEVICE N/A 2016    Procedure: INSERT INTRAUTERINE DEVICE;  Surgeon: Nahed Butler MD;  [facility]     INT UTERINE FIBRIOD EMBOLIZATION  10/29/2014     LAPAROSCOPIC CHOLECYSTECTOMY  2008    Dr. Arnol GRUBBS TX, CERVICAL  1992    s/p GORDONP, done at [facility] in [place].     ORBITOTOMY Right 03/15/2016    Procedure: ORBITOTOMY;  Surgeon: Myron Cyr MD;  [facility]     ORBITOTOMY Right 2017    Procedure: ORBITOTOMY;  RIGHT ORBITOTOMY AND BIOPSY;  Surgeon: Charis Holbrook MD;  Location: [facility]     REPAIR PTOSIS Right 2017    Procedure: REPAIR PTOSIS;  RIGHT UPPER LID PTOSIS REPAIR;  Surgeon: Myron Cyr MD;  Location: [facility]     UPPER GI ENDOSCOPY  10/21/2008    mild gastritis, Dr. Rocky CALDERA ECHO HEART XTHORACIC,COMPLETE, W/O DOPPLER  2004    [facility] WNL, EF 60%     Current Outpatient Medications   Medication     acetaminophen (TYLENOL) 325 MG tablet     ADVAIR DISKUS 250-50 MCG/ACT inhaler     albuterol (2.5 MG/3ML) 0.083% neb solution     albuterol (PROAIR HFA, PROVENTIL HFA, VENTOLIN HFA) 108 (90 BASE) MCG/ACT inhaler     BANOPHEN 25 MG tablet     blood glucose (NO BRAND SPECIFIED) lancets standard     blood glucose (NO BRAND SPECIFIED) test strip     Blood Pressure Monitor KIT     calcium carbonate (TUMS) 500 MG chewable tablet     clopidogrel (PLAVIX)  75 MG tablet     COMPRESSION STOCKINGS     cyclobenzaprine (FLEXERIL) 10 MG tablet     dicyclomine (BENTYL) 20 MG tablet     diphenhydrAMINE (BENADRYL) 25 MG capsule     empagliflozin (JARDIANCE) 10 MG TABS tablet     EPIPEN 2-RIKY 0.3 MG/0.3ML injection     esomeprazole (NEXIUM) 40 MG DR capsule     famotidine (PEPCID) 20 MG tablet     fexofenadine (ALLEGRA) 180 MG tablet     fluconazole (DIFLUCAN) 200 MG tablet     fluticasone (FLONASE) 50 MCG/ACT nasal spray     folic acid (FOLVITE) 1 MG tablet     hydrocortisone (CORTAID) 1 % external cream     insulin glargine (LANTUS PEN) 100 UNIT/ML pen     insulin pen needle (BD MARCK U/F) 32G X 4 MM miscellaneous     ketoconazole (NIZORAL) 2 % shampoo     levocetirizine (XYZAL) 5 MG tablet     levofloxacin (LEVAQUIN) 500 MG tablet     levonorgestrel (MIRENA) 20 MCG/DAY IUD     lisinopril-hydrochlorothiazide (ZESTORETIC) 20-25 MG tablet     magnesium 250 MG tablet     metFORMIN (GLUCOPHAGE-XR) 500 MG 24 hr tablet     metoprolol succinate ER (TOPROL XL) 100 MG 24 hr tablet     Multiple Vitamin (TAB-A-GERALD) TABS     nitroGLYcerin (NITROSTAT) 0.4 MG sublingual tablet     olopatadine (PATANOL) 0.1 % ophthalmic solution     pravastatin (PRAVACHOL) 40 MG tablet     prochlorperazine (COMPAZINE) 5 MG tablet     sennosides (SENOKOT) 8.6 MG tablet     spironolactone (ALDACTONE) 25 MG tablet     sucralfate (CARAFATE) 1 GM tablet     terbinafine (LAMISIL) 1 % external cream     traMADol (ULTRAM) 50 MG tablet     vitamin D2 (ERGOCALCIFEROL) 01419 units (1250 mcg) capsule     azelastine (ASTELIN) 0.1 % nasal spray     Ergocalciferol (VITAMIN D2) 50 MCG (2000 UT) TABS     estradiol (VAGIFEM) 10 MCG TABS vaginal tablet     terconazole (TERAZOL 3) 80 MG vaginal suppository     vitamin B-2 (RIBOFLAVIN) 100 MG TABS tablet     No current facility-administered medications for this visit.     Allergies   Allergen Reactions     Amoxicillin-Pot Clavulanate      Artificial Sweetner (Informational  "Only)  Other (See Comments)     Increased headache     Augmentin      Unknown possible hives and edema     Azithromycin      Diatrizoate Other (See Comments)     Pt wants this listed because she is allergic to shellfish      Imitrex [Sumatriptan]      Severe face/neck/chest tightness and flushing side effects      Penicillins Hives     Unknown      Pork Allergy      Stomach pain, cramping, diarrhea, itching, nausea and headaches     Shellfish Allergy Hives and Swelling     Sulfa Drugs Hives and Swelling     Zithromax [Azithromycin Dihydrate] Swelling     Unknown        Review of Systems         Objective    /75 (BP Location: Right arm, Patient Position: Sitting, Cuff Size: Adult Regular)   Pulse 80   Ht 1.575 m (5' 2\")   Wt 76.6 kg (168 lb 12.8 oz)   SpO2 97%   BMI 30.87 kg/m    Body mass index is 30.87 kg/m .  Physical Exam   GENERAL: healthy, alert and no distress  Mild red across nose and both cheeks no focal lesions  ABDOMEN: soft, mild tender upper, no hepatosplenomegaly, no masses and bowel sounds normal  MS: no gross musculoskeletal defects noted, no edema            "

## 2023-02-10 NOTE — NURSING NOTE
Janine Cornell is a 57 year old female patient that presents today in clinic for the following:    Chief Complaint   Patient presents with     Follow Up     Derm Problem     The patient's allergies and medications were reviewed as noted. A set of vitals were recorded as noted without incident. The patient does not have any other questions for the provider.    Michelle Hills, EMT at 2:38 PM on 2/10/2023

## 2023-02-11 LAB
CD19 CELLS # BLD: <1 CELLS/UL (ref 107–698)
CD19 CELLS NFR BLD: <1 % (ref 6–27)

## 2023-02-13 ENCOUNTER — TELEPHONE (OUTPATIENT)
Dept: RHEUMATOLOGY | Facility: CLINIC | Age: 58
End: 2023-02-13
Payer: COMMERCIAL

## 2023-02-13 ENCOUNTER — PATIENT OUTREACH (OUTPATIENT)
Dept: CARE COORDINATION | Facility: CLINIC | Age: 58
End: 2023-02-13
Payer: COMMERCIAL

## 2023-02-13 DIAGNOSIS — M47.812 OSTEOARTHRITIS OF CERVICAL SPINE, UNSPECIFIED SPINAL OSTEOARTHRITIS COMPLICATION STATUS: ICD-10-CM

## 2023-02-13 DIAGNOSIS — M19.90 INFLAMMATORY ARTHRITIS: ICD-10-CM

## 2023-02-13 DIAGNOSIS — M47.812 CERVICAL SPINE DEGENERATION: Primary | ICD-10-CM

## 2023-02-13 LAB
ANCA AB PATTERN SER IF-IMP: NORMAL
C-ANCA TITR SER IF: NORMAL {TITER}
IGA SERPL-MCNC: 197 MG/DL (ref 84–499)
IGG SERPL-MCNC: 680 MG/DL (ref 610–1616)
IGM SERPL-MCNC: 30 MG/DL (ref 35–242)
PATH REPORT.COMMENTS IMP SPEC: NORMAL
PATH REPORT.COMMENTS IMP SPEC: NORMAL
PATH REPORT.FINAL DX SPEC: NORMAL
PATH REPORT.MICROSCOPIC SPEC OTHER STN: NORMAL
PATH REPORT.MICROSCOPIC SPEC OTHER STN: NORMAL
PATH REPORT.RELEVANT HX SPEC: NORMAL

## 2023-02-13 NOTE — PROGRESS NOTES
Clinic Care Coordination Contact    Situation: Patient chart reviewed by care coordinator.    Background: Referral received regarding housing concerns.     Assessment: Patient was unable to be reached x2.     Plan/Recommendations: No further outreaches will be made at this time unless a new referral is made or a change in the patient's status occurs.  Janine was provided with [facility] contact information within voicemail messages and encouraged to call with any questions or concerns.  LIT CC to mail [facility] letter.       SKY Rangel ([place])  , Care Coordination  [facility]  [facility]  [facility]  [email or web address]   Office: [phone]

## 2023-02-13 NOTE — LETTER
[facility] CARE COORDINATION    February 13, 2023    Janine Cornell  [address] [place] [facility] [address]      Dear Janine,        I am a clinic care coordinator who works with Nahed Butler MD with the [facility]. I recently tried to call and was unable to reach you. Below is a description of clinic care coordination and how I can further assist you.       The goal of clinic care coordination is to help you manage your health and improve access to the health care system. Our team works alongside your provider to assist you in determining your health and social needs. We can help you obtain health care and community resources, providing you with necessary information and education. We can work with you through any barriers and develop a care plan that helps coordinate and strengthen the communication between you and your care team.    Please feel free to contact me with any questions or concerns regarding care coordination and what we can offer.      We are focused on providing you with the highest-quality healthcare experience possible.    Sincerely,     SKY Rangel ([place])  , Care Coordination  [facility] Pediatric [facility]ty Clinics  [facility]  [facility]  [email or web address]   Office: [phone]

## 2023-02-13 NOTE — TELEPHONE ENCOUNTER
Message left for patient to call this RN back directly since patient does not have MyCHart.  Will review message from Dr. Mckinnon when patient calls back.    ISHA PhillipsN, RN  RN Care Coordinator Rheumatology        ----- Message from Majo Mckinnon MD sent at 2/13/2023 11:06 AM CST -----  There is no evidence of inflammatory arthritis on these joint x-rays, just mechanical or degenerative arthritis. Please let me know if you need a referral to spine doctor.

## 2023-02-14 ENCOUNTER — PATIENT OUTREACH (OUTPATIENT)
Dept: INTERNAL MEDICINE | Facility: CLINIC | Age: 58
End: 2023-02-14
Payer: COMMERCIAL

## 2023-02-14 ENCOUNTER — TELEPHONE (OUTPATIENT)
Dept: ENDOCRINOLOGY | Facility: CLINIC | Age: 58
End: 2023-02-14

## 2023-02-14 ENCOUNTER — TELEPHONE (OUTPATIENT)
Dept: FAMILY MEDICINE | Facility: CLINIC | Age: 58
End: 2023-02-14
Payer: COMMERCIAL

## 2023-02-14 DIAGNOSIS — E11.9 TYPE 2 DIABETES, HBA1C GOAL < 8% (H): Primary | ICD-10-CM

## 2023-02-14 DIAGNOSIS — E04.1 THYROID NODULE: ICD-10-CM

## 2023-02-14 LAB
MYELOPEROXIDASE AB SER IA-ACNC: 0.8 U/ML
MYELOPEROXIDASE AB SER IA-ACNC: NEGATIVE
PROTEINASE3 AB SER IA-ACNC: <1 U/ML
PROTEINASE3 AB SER IA-ACNC: NEGATIVE

## 2023-02-14 NOTE — TELEPHONE ENCOUNTER
Patient Outreach RN Care Coordination Note        OBJECTIVE        Multiple thyroid nodules found from US thyroid on 1/19/23, pt needs to see endocrinology. Also her diabetes are not under control with the current meds and she has concerns about her current diabete meds.        ASSESSMENT / PLAN        Dr. Sloano wants the pt to see Endocrinology MD for both condition. I placed the new referral and will try to schedule her with MD. Pt is aware.        Aly-Ramila Ferrera RNCC  Primary Care Clinic  Phone [phone]  Fax     [phone]

## 2023-02-14 NOTE — TELEPHONE ENCOUNTER
[facility] Call Center    Phone Message    May a detailed message be left on voicemail: yes     Reason for Call: Other: Patient is being referred to be seen for Thyroid nodule; type 2 DM. Ref by Dr Solano. Patient is currently scheduled on 8/2/23 at 1:00pm with Dr Antonio. Sending encounter to clinic for review. Please call patient to schedule if sooner opening is needed. Patient does prefer to see female provider. Of note, patient is scheduled in May with Krissy Danielle for diabetes but new referral was placed to additionally be seen for the thyroid nodule. Patient stated she would prefer to see a provider that can see patient for both diagnosis but declined to cancel the appointment with Krissy at the time of call to patient     Action Taken: Message routed to:  [facility] Endocrinology    Travel Screening: Not Applicable

## 2023-02-14 NOTE — TELEPHONE ENCOUNTER
LVM w/ endo scheduling number for pt to change upcoming 5/2/23 appointment to be seen by an MD. Forwarded message to Endo pool to reach out.

## 2023-02-14 NOTE — TELEPHONE ENCOUNTER
----- Message from Katy Ferrera RN sent at 2/14/2023  9:19 AM CST -----  I placed the new referral for endocrinology. Pt needs to see MD for both diabetes and thyroid nodules. Please schedule with an MD and call the pt. Thank you.    Katy

## 2023-02-14 NOTE — TELEPHONE ENCOUNTER
Patient returned call to this RN.    Reviewed message from Dr. Mckinnon-    There is no evidence of inflammatory arthritis on these joint x-rays, just mechanical or degenerative arthritis. Please let me know if you need a referral to spine doctor.    Patient said she would like a referral to spine doctor.  Patient said she will discuss in March at her appt with Dr. Mckinnon.    AUGUSTINE Phillips, RN  RN Care Coordinator Rheumatology

## 2023-02-23 DIAGNOSIS — E55.9 VITAMIN D DEFICIENCY: ICD-10-CM

## 2023-02-24 ENCOUNTER — TELEPHONE (OUTPATIENT)
Dept: GASTROENTEROLOGY | Facility: CLINIC | Age: 58
End: 2023-02-24
Payer: COMMERCIAL

## 2023-02-24 ENCOUNTER — TELEPHONE (OUTPATIENT)
Dept: FAMILY MEDICINE | Facility: CLINIC | Age: 58
End: 2023-02-24
Payer: COMMERCIAL

## 2023-02-24 ENCOUNTER — LAB (OUTPATIENT)
Dept: LAB | Facility: CLINIC | Age: 58
End: 2023-02-24
Payer: COMMERCIAL

## 2023-02-24 DIAGNOSIS — N39.0 UTI (URINARY TRACT INFECTION): Primary | ICD-10-CM

## 2023-02-24 DIAGNOSIS — R30.0 DYSURIA: ICD-10-CM

## 2023-02-24 LAB
ALBUMIN UR-MCNC: NEGATIVE MG/DL
APPEARANCE UR: CLEAR
BILIRUB UR QL STRIP: NEGATIVE
COLOR UR AUTO: ABNORMAL
GLUCOSE UR STRIP-MCNC: >=1000 MG/DL
HGB UR QL STRIP: ABNORMAL
KETONES UR STRIP-MCNC: NEGATIVE MG/DL
LEUKOCYTE ESTERASE UR QL STRIP: ABNORMAL
MUCOUS THREADS #/AREA URNS LPF: PRESENT /LPF
NITRATE UR QL: NEGATIVE
PH UR STRIP: 6.5 [PH] (ref 5–7)
RBC URINE: 81 /HPF
RENAL TUB EPI: 1 /HPF
SP GR UR STRIP: 1.02 (ref 1–1.03)
SQUAMOUS EPITHELIAL: 1 /HPF
TRANSITIONAL EPI: <1 /HPF
UROBILINOGEN UR STRIP-MCNC: NORMAL MG/DL
WBC URINE: 47 /HPF

## 2023-02-24 PROCEDURE — 87086 URINE CULTURE/COLONY COUNT: CPT | Mod: 90 | Performed by: PATHOLOGY

## 2023-02-24 PROCEDURE — 99000 SPECIMEN HANDLING OFFICE-LAB: CPT | Performed by: PATHOLOGY

## 2023-02-24 PROCEDURE — 81001 URINALYSIS AUTO W/SCOPE: CPT | Performed by: PATHOLOGY

## 2023-02-24 RX ORDER — NITROFURANTOIN 25; 75 MG/1; MG/1
100 CAPSULE ORAL 2 TIMES DAILY
Qty: 14 CAPSULE | Refills: 0 | Status: SHIPPED | OUTPATIENT
Start: 2023-02-24 | End: 2023-03-03

## 2023-02-24 NOTE — TELEPHONE ENCOUNTER
Screening Questions  BLUE  KIND OF PREP RED  LOCATION [review exclusion criteria] GREEN  SEDATION TYPE        n Are you active on mychart?       [place] Ordering/Referring Provider?        [employer] What type of coverage do you have?      n Have you had a positive covid test in the last 14 days?     30.7 1. BMI  [BMI 40+ - review exclusion criteria]    y  2. Are you able to give consent for your medical care? [IF NO,RN REVIEW]          y 3. Are you taking any prescription pain medications on a routine schedule   (ex narcotics: oxycodone, roxicodone, oxycontin,  and percocet)? [RN Review]        tramadol  3a. EXTENDED PREP What kind of prescription?     n 4. Do you have any chemical dependencies such as alcohol, street drugs, or methadone?        **If yes 3- 5 , please schedule with MAC sedation.**          IF YES TO ANY 6 - 10 - HOSPITAL SETTING ONLY.     n 6.   Do you need assistance transferring?     n 7.   Have you had a heart or lung transplant?    n 8.   Are you currently on dialysis?   n 9.   Do you use daily home oxygen?   n 10. Do you take nitroglycerin?   10a. If yes, how often?     11. [FEMALES]  n Are you currently pregnant?    11a. n If yes, how many weeks? [ Greater than 12 weeks, OR NEEDED]    n 12. Do you have Pulmonary Hypertension? *NEED PAC APPT AT [facility] w/ MAC*     n 13. [review exclusion criteria]  Do you have any implantable devices in your body (pacemaker, defib, LVAD)?    n 14. In the past 6 months, have you had any heart related issues including cardiomyopathy or heart attack?     14a. n If yes, did it require cardiac stenting if so when?     n15. Have you had a stroke or Transient ischemic attack (TIA - aka  mini stroke ) within 6 months?      n 16. Do you have mod to severe Obstructive Sleep Apnea?  [Hospital only]    n 17. Do you have SEVERE AND UNCONTROLLED asthma? *NEED PAC APPT AT [facility] w/MAC*     y plavix 18. Are you currently taking any blood thinners?     18a. If yes, inform patient  "to \"follow up w/ ordering provider for bridging instructions.\"    n 19. Do you take the medication Phentermine?    19a. If yes, \"Hold for 7 days before procedure.  Please consult your prescribing provider if you have questions about holding this medication.\"     n  20. Do you have chronic kidney disease?      y  21. Do you have a diagnosis of diabetes?     n  22. On a regular basis do you go 3-5 days between bowel movements?      23. Preferred LOCAL Pharmacy for Pre Prescription    [ LIST ONLY ONE PHARMACY]        [id number] - [address] AT [address] [place] [address]          - CLOSING REMINDERS -    Informed patient they will need an adult    Cannot take any type of public or medical transportation alone    Conscious Sedation- Needs  for 6 hours after the procedure       MAC/General-Needs  for 24 hours after procedure    Pre-Procedure Covid test to be completed [[facility] PCR Testing Required]    Confirmed Nurse will call to complete assessment       - SCHEDULING DETAILS -  n Hospital Setting Required? If yes, what is the exclusion?:    [place]  Surgeon    4/25/23  Date of Procedure  Lower Endoscopy [Colonoscopy]  Type of Procedure Scheduled  [facility] [place] Location   GOLYTELY EXTENDED - If you answer yes to questions #1, #3, #22 (Chuck Raderen and CF pts)Which Colonoscopy Prep was Sent?     CS Sedation Type     n PAC / Pre-op Required                 "

## 2023-02-24 NOTE — TELEPHONE ENCOUNTER
Spoke with pt. She has been having urinary frequency, urgency and burning sense for 3 days. She is allergic to sulfa and she had macrobid last year with good result. Therefore I will send macrobid x 7 days per protocol. When she come and  the med, she wants to have UA done. UA/UC order placed.

## 2023-02-24 NOTE — TELEPHONE ENCOUNTER
Spoke with pt over the phone and scheduled sooner appointment with Dr. Norman using ELSA per Dr. Smith. Pt stated she was notable to do a video visit and was scheduled for first available in person ELSA with Dr. Norman as a result.  Esequiel Keith on 2/24/2023 at 1:06 PM

## 2023-02-24 NOTE — TELEPHONE ENCOUNTER
To schedulers:  please  move forward on the schedule to open non-call week ELSA space with Dr Norman, using 2 back to back 30 minute ELSA spaces such as are currently available on 3/27, 3/30, 4/3, 4/4, 4/6, 4/10, 4/11, 4/12, 4/17, 4/18, 4/19, 4/20  Thanks  Christie Smith MD  Endocrine Triage

## 2023-02-24 NOTE — TELEPHONE ENCOUNTER
[facility] Call Center    Phone Message    May a detailed message be left on voicemail: yes     Reason for Call: Other: Patient would like to speak to someone on the care team about possible bladder infection. Please call the patient back asap      Action Taken: Message routed to:  [facility] pcp    Travel Screening: Not Applicable

## 2023-02-26 LAB — BACTERIA UR CULT: NORMAL

## 2023-02-27 RX ORDER — ERGOCALCIFEROL 1.25 MG/1
50000 CAPSULE, LIQUID FILLED ORAL
Qty: 4 CAPSULE | Refills: 0 | Status: SHIPPED | OUTPATIENT
Start: 2023-02-27 | End: 2023-12-07

## 2023-02-27 NOTE — TELEPHONE ENCOUNTER
vitamin D2 (ERGOCALCIFEROL) 64018 units (1250 mcg) capsule      Last Written Prescription Date:  11/4/2022  Last Fill Quantity: 12 cap,   # refills: 0  Last Office Visit : 1/25/2023  [facility]  Future Office visit:  3/9/2023    Routing refill request to provider for review/approval because:  Dose exceeds medication protocol parameters.  - pended Rx as last ordered

## 2023-02-27 NOTE — TELEPHONE ENCOUNTER
RECORDS RECEIVED FROM: internal    DATE RECEIVED: 4.12.23    NOTES (FOR ALL VISITS) STATUS DETAILS   OFFICE NOTES from referring provider internal   Dr Solano.   OFFICE NOTES from other specialist internal  8/19/22 [place]    ED NOTES internal  3.4.22 [place]    OPERATIVE REPORT  (thyroid, pituitary, adrenal, parathyroid)     MEDICATION LIST internal     IMAGING        XR (Chest) internal  1.19.23   CT (HEAD/NECK/CHEST/ABDOMEN) internal  2.10.23, 5.25.21,      ULTRASOUND (HEAD/NECK) internal  1.119.23    LABS     DIABETES: HBGA1C, CREATININE, FASTING LIPIDS, MICROALBUMIN URINE, POTASSIUM, TSH, T4    THYROID: TSH, T4, CBC, THYRODLONULIN, TOTAL T3, FREE T4, CALCITONIN, CEA internal

## 2023-03-09 ENCOUNTER — OFFICE VISIT (OUTPATIENT)
Dept: RHEUMATOLOGY | Facility: CLINIC | Age: 58
End: 2023-03-09
Attending: INTERNAL MEDICINE
Payer: COMMERCIAL

## 2023-03-09 ENCOUNTER — TELEPHONE (OUTPATIENT)
Dept: RHEUMATOLOGY | Facility: CLINIC | Age: 58
End: 2023-03-09

## 2023-03-09 ENCOUNTER — TELEPHONE (OUTPATIENT)
Dept: GASTROENTEROLOGY | Facility: CLINIC | Age: 58
End: 2023-03-09

## 2023-03-09 ENCOUNTER — LAB (OUTPATIENT)
Dept: LAB | Facility: CLINIC | Age: 58
End: 2023-03-09
Payer: COMMERCIAL

## 2023-03-09 ENCOUNTER — OFFICE VISIT (OUTPATIENT)
Dept: FAMILY MEDICINE | Facility: CLINIC | Age: 58
End: 2023-03-09
Payer: COMMERCIAL

## 2023-03-09 VITALS
HEART RATE: 76 BPM | TEMPERATURE: 98.8 F | OXYGEN SATURATION: 96 % | HEIGHT: 62 IN | SYSTOLIC BLOOD PRESSURE: 118 MMHG | DIASTOLIC BLOOD PRESSURE: 80 MMHG | WEIGHT: 169.75 LBS | BODY MASS INDEX: 31.24 KG/M2

## 2023-03-09 VITALS
DIASTOLIC BLOOD PRESSURE: 80 MMHG | TEMPERATURE: 98.8 F | OXYGEN SATURATION: 96 % | WEIGHT: 169.8 LBS | HEART RATE: 76 BPM | BODY MASS INDEX: 31.06 KG/M2 | SYSTOLIC BLOOD PRESSURE: 118 MMHG

## 2023-03-09 DIAGNOSIS — D72.829 LEUKOCYTOSIS, UNSPECIFIED TYPE: ICD-10-CM

## 2023-03-09 DIAGNOSIS — R35.0 URINE FREQUENCY: ICD-10-CM

## 2023-03-09 DIAGNOSIS — M47.812 OSTEOARTHRITIS OF CERVICAL SPINE, UNSPECIFIED SPINAL OSTEOARTHRITIS COMPLICATION STATUS: ICD-10-CM

## 2023-03-09 DIAGNOSIS — M31.30 GRANULOMATOSIS WITH POLYANGIITIS WITHOUT RENAL INVOLVEMENT (H): Primary | ICD-10-CM

## 2023-03-09 DIAGNOSIS — I77.82 ANCA-ASSOCIATED VASCULITIS (H): ICD-10-CM

## 2023-03-09 DIAGNOSIS — K57.30 DIVERTICULOSIS OF LARGE INTESTINE WITHOUT HEMORRHAGE: ICD-10-CM

## 2023-03-09 DIAGNOSIS — R14.0 ABDOMINAL BLOATING: ICD-10-CM

## 2023-03-09 DIAGNOSIS — M25.531 RIGHT WRIST PAIN: ICD-10-CM

## 2023-03-09 DIAGNOSIS — R10.13 EPIGASTRIC PAIN: Primary | ICD-10-CM

## 2023-03-09 DIAGNOSIS — Z51.81 MEDICATION MONITORING ENCOUNTER: ICD-10-CM

## 2023-03-09 DIAGNOSIS — I77.82 ANCA-ASSOCIATED VASCULITIS (H): Primary | ICD-10-CM

## 2023-03-09 DIAGNOSIS — R19.5 LOOSE STOOLS: ICD-10-CM

## 2023-03-09 LAB
ALBUMIN UR-MCNC: NEGATIVE MG/DL
APPEARANCE UR: CLEAR
BASOPHILS # BLD AUTO: 0.1 10E3/UL (ref 0–0.2)
BASOPHILS NFR BLD AUTO: 0 %
BILIRUB UR QL STRIP: NEGATIVE
COLOR UR AUTO: ABNORMAL
EOSINOPHIL # BLD AUTO: 0.3 10E3/UL (ref 0–0.7)
EOSINOPHIL NFR BLD AUTO: 2 %
ERYTHROCYTE [DISTWIDTH] IN BLOOD BY AUTOMATED COUNT: 15.1 % (ref 10–15)
GLUCOSE UR STRIP-MCNC: >=1000 MG/DL
HCT VFR BLD AUTO: 40.9 % (ref 35–47)
HGB BLD-MCNC: 13.3 G/DL (ref 11.7–15.7)
HGB UR QL STRIP: NEGATIVE
IMM GRANULOCYTES # BLD: 0.1 10E3/UL
IMM GRANULOCYTES NFR BLD: 0 %
KETONES UR STRIP-MCNC: NEGATIVE MG/DL
LEUKOCYTE ESTERASE UR QL STRIP: NEGATIVE
LYMPHOCYTES # BLD AUTO: 2.6 10E3/UL (ref 0.8–5.3)
LYMPHOCYTES NFR BLD AUTO: 19 %
MCH RBC QN AUTO: 25.2 PG (ref 26.5–33)
MCHC RBC AUTO-ENTMCNC: 32.5 G/DL (ref 31.5–36.5)
MCV RBC AUTO: 78 FL (ref 78–100)
MONOCYTES # BLD AUTO: 0.9 10E3/UL (ref 0–1.3)
MONOCYTES NFR BLD AUTO: 6 %
NEUTROPHILS # BLD AUTO: 9.7 10E3/UL (ref 1.6–8.3)
NEUTROPHILS NFR BLD AUTO: 73 %
NITRATE UR QL: NEGATIVE
NRBC # BLD AUTO: 0 10E3/UL
NRBC BLD AUTO-RTO: 0 /100
PH UR STRIP: 5.5 [PH] (ref 5–7)
PLATELET # BLD AUTO: 368 10E3/UL (ref 150–450)
RBC # BLD AUTO: 5.27 10E6/UL (ref 3.8–5.2)
SP GR UR STRIP: 1.03 (ref 1–1.03)
UROBILINOGEN UR STRIP-MCNC: NORMAL MG/DL
WBC # BLD AUTO: 13.6 10E3/UL (ref 4–11)

## 2023-03-09 PROCEDURE — 81003 URINALYSIS AUTO W/O SCOPE: CPT | Performed by: PATHOLOGY

## 2023-03-09 PROCEDURE — 36415 COLL VENOUS BLD VENIPUNCTURE: CPT | Performed by: PATHOLOGY

## 2023-03-09 PROCEDURE — 85025 COMPLETE CBC W/AUTO DIFF WBC: CPT | Performed by: PATHOLOGY

## 2023-03-09 PROCEDURE — 99215 OFFICE O/P EST HI 40 MIN: CPT | Performed by: FAMILY MEDICINE

## 2023-03-09 PROCEDURE — 99214 OFFICE O/P EST MOD 30 MIN: CPT | Performed by: INTERNAL MEDICINE

## 2023-03-09 PROCEDURE — G0463 HOSPITAL OUTPT CLINIC VISIT: HCPCS | Performed by: INTERNAL MEDICINE

## 2023-03-09 ASSESSMENT — PAIN SCALES - GENERAL: PAINLEVEL: MODERATE PAIN (5)

## 2023-03-09 NOTE — NURSING NOTE
"Janine Cornell is a 57 year old female patient that presents today in clinic for the following:    Chief Complaint   Patient presents with     RECHECK     Follow up.     The patient's allergies and medications were reviewed as noted. A set of vitals were recorded as noted without incident: /80   Pulse 76   Temp 98.8  F (37.1  C)   Ht 1.575 m (5' 2\")   Wt 77 kg (169 lb 12.1 oz)   SpO2 96%   BMI 31.05 kg/m  . The patient does not have any other questions for the provider.    Gato Hemphill, EMT at 1:10 PM on 3/9/2023.  Primary care clinic: [phone]  "

## 2023-03-09 NOTE — PATIENT INSTRUCTIONS
Spine referral    Rituximab 1 gram one dose only this month    Labs in May 2023    Follow up eye exam 8/2023    Return in person in about 5-6 months

## 2023-03-09 NOTE — LETTER
3/9/2023       RE: Janine Cornell  [address]  [facility] [address]     Dear Colleague,    Thank you for referring your patient, Janine Cornell, to the [facility] RHEUMATOLOGY CLINIC [place] [facility]. Please see a copy of my visit note below.    Rheumatology F/U In Person Visit Note    Last visit note: 1/25/2023    Today's visit date: 3/9/2023    Reason for in person visit: F/U GPA    HPI     Ms. 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 She is s/p tx with HCQ since 5/2014, now off due to abnormal eye exam. On rituximab since 12/20218 with great response. Previously tried and did not tolerate MTX, AZA.    She had lateral orbitotomy and debulking orbital mass right eye on 9/26/18 with Dr. Shah and Dr. Valdez at [place]. 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 There were no complications according to the op note.    Today 3/9/2023: Reports pain/swelling of her hands. S/p last rituximab 1 gram on 9/7/2022.       1/25/2023: Janine is doing a phone visit for follow up, was feeling ill and switched in person to phone visit. She got sick around Thanksgiving, saw her PCP on 12/17, was tested positive for COVID, it took a longtime to feel better, did not take paxlovid as it was already past 2 weeks. Since then, she has not been feeling well. Has headaches, body ache, her eyes especially R eye look pink, they burn and itch a lot. Has sense of smell but can not taste her food. She is very tired, hardly leaves her house. Last time, left house for doctor visit, was tired and sore. Has tingling and pinprick feeling over arms and legs. Has upcoming follow up with PCP on 2/10. She had thyroid US for thyroid nodules. She had abnormal screening mammogram on 1/9, will go back for diagnostic mammogram and US of L breast on  1/31.    08/19/2022  Reports fatigue and headaches. Headache worsens after taking Zofran for nausea. Joint symptoms come and go. Last rituximab on 4/6/22. Rituximab is helpful but feels like it wears off prior to the 6 months. Next appointment scheduled for October 2022. Develops intermittent vaginal yeast infection and thrush related to rising blood glucose. Right eye without symptoms. No new fevers, chills, skin changes. Son is wondering if she takes herbal supplements for headaches will this interact with any of her medications. Scheduled to meet with pharmacist today.        4/22/2022:   Each rituximab infusion causes vaginal yeast infection and thrush related to rise in BG. Her BG has stayed in higher level since last rituximab. Has more ache and pain, myalgia and arthralgia. Recently has had headaches with high BP. Had last rituximab 1000 mg on 4/6.Her R eye is itching and swelling up.  Today, her BG is 177.Has gained 20 lbs since Feb 2022. Had severe pain in her arm after covid vaccine, it took a month to get better.      12/16/2021: Janine presents for follow up. Reports ESOB with cold, has ongoing joint pain. R eye pain is intermittent.  Reports headaches after rituximab 1 gram on 10/18/2021.  Last week, her R knee was hurting.      8/20/2021: Janine has pain over arms, knees. Some days, feel stiff all day long. Some days can't do stairs. Hard to tell if there is swelling as has more water retention during summer.  Some days, eye swells up like today. No fevers, SOB, CP or cough.  Has rosacea but some days wakes up with heat rash over sun. Her face is peeling.  Sometimes can't lift things or grab things with pain from elbow to hands. Has shoulder and neck pain but this forearm pain is new.  Stopped HCQ in mid July due to concern for potential HCQ toxicity on OCT, her eye exam with Dr. Vernon has been re-scheduled and upcoming on 9/14 to address HCQ toxicity, pain is not worse off HCQ.  Not COVID vaccinated but is  cautious.      5/10/2021: Joint ache, knees hurt, R elbow hurts, R arm hurts, R hand hurts. Has lots of swelling in her joints especially hands.    Depending on weather, joints jhurt, sometimes pain is 7/10.  Has problem with taking stairs and balance.  Gets off balance.   R eye gets tinglin, swelling.  Gets out of breath, gets worse with seasonal allergies.  Over past month and half, took nitro more, like 8 times.  No hemooptysis, no hematuria.  Has allergies.      4/21/2021: Had last rituximab 1 gram on 1/19, 2/2/2021. Reports rituximab starts to wear off earlier, has more sx this time. Eye swelling is intermittent. Gets indigestion/ GERD sx with constipation after the infusion.  She reports having asthma, swelling of neck, arms. She thinks that it could be from her vasculitis. She is worried about going down on rituximab dose.   Otherwise unchanged sx, no SOB or hemoptysis or persistent cough, or   Somedays her knees and hips hurt a lot, feel like bone on bone in her knees, especially on changing position.      Component      Latest Ref Rng 2/28/2013 2/28/2013          12:14 PM 12:14 PM   WBC      4.0 - 11.0 10e9/L     RBC Count      3.8 - 5.2 10e12/L     Hemoglobin      11.7 - 15.7 g/dL     Hematocrit      35.0 - 47.0 %     MCV      78 - 100 fl     MCH      26.5 - 33.0 pg     MCHC      31.5 - 36.5 g/dL     RDW      10.0 - 15.0 %     Platelet Count      150 - 450 10e9/L     Specimen Description           Lyme Screen IgG and IgM           Vitamin D Deficiency screening      30 - 75 ug/L     Ferritin      10 - 300 ng/mL     Sed Rate      0 - 20 mm/h     ALLI Screen by EIA      <1.0     Rheumatoid Factor      0 - 14 IU/mL     CK Total      30 - 225 U/L  78   Uric Acid      2.5 - 7.5 mg/dL 6.7      Component      Latest Ref Rng 2/28/2013          12:14 PM   WBC      4.0 - 11.0 10e9/L    RBC Count      3.8 - 5.2 10e12/L    Hemoglobin      11.7 - 15.7 g/dL    Hematocrit      35.0 - 47.0 %    MCV      78 - 100 fl     MCH      26.5 - 33.0 pg    MCHC      31.5 - 36.5 g/dL    RDW      10.0 - 15.0 %    Platelet Count      150 - 450 10e9/L    Specimen Description       Serum   Lyme Screen IgG and IgM       Test value: <0.75....Interpretation: Negative....If you highly suspect Lyme . . .   Vitamin D Deficiency screening      30 - 75 ug/L    Ferritin      10 - 300 ng/mL    Sed Rate      0 - 20 mm/h    ALLI Screen by EIA      <1.0    Rheumatoid Factor      0 - 14 IU/mL    CK Total      30 - 225 U/L    Uric Acid      2.5 - 7.5 mg/dL      Component      Latest Ref Rng 2/28/2013 2/28/2013 2/28/2013 2/28/2013          12:14 PM 12:14 PM 12:14 PM 12:14 PM   WBC      4.0 - 11.0 10e9/L       RBC Count      3.8 - 5.2 10e12/L       Hemoglobin      11.7 - 15.7 g/dL       Hematocrit      35.0 - 47.0 %       MCV      78 - 100 fl       MCH      26.5 - 33.0 pg       MCHC      31.5 - 36.5 g/dL       RDW      10.0 - 15.0 %       Platelet Count      150 - 450 10e9/L       Specimen Description             Lyme Screen IgG and IgM             Vitamin D Deficiency screening      30 - 75 ug/L       Ferritin      10 - 300 ng/mL    10   Sed Rate      0 - 20 mm/h   23 (H)    ALLI Screen by EIA      <1.0  <1.0 . . .     Rheumatoid Factor      0 - 14 IU/mL 26 (H)      CK Total      30 - 225 U/L       Uric Acid      2.5 - 7.5 mg/dL         5/2013  CBC WITH PLATELETS DIFFERENTIAL       Component Value Range    WBC 11.3 (*) 4.0 - 11.0 10e9/L    RBC Count 4.56  3.8 - 5.2 10e12/L    Hemoglobin 11.1 (*) 11.7 - 15.7 g/dL    Hematocrit 34.3 (*) 35.0 - 47.0 %    MCV 75 (*) 78 - 100 fl    MCH 24.3 (*) 26.5 - 33.0 pg    MCHC 32.4  31.5 - 36.5 g/dL    RDW 16.1 (*) 10.0 - 15.0 %    Platelet Count 315  150 - 450 10e9/L    Diff Method Automated Method      % Neutrophils 71.6  40 - 75 %    % Lymphocytes 20.9  20 - 48 %    % Monocytes 4.3  0 - 12 %    % Eosinophils 2.8  0 - 6 %    % Basophils 0.2  0 - 2 %    % Immature Granulocytes 0.2  0 - 0.4 %    Absolute Neutrophil 8.1  1.6 -  8.3 10e9/L    Absolute Lymphocytes 2.4  0.8 - 5.3 10e9/L    Absolute Monoctyes 0.5  0.0 - 1.3 10e9/L    Absolute Eosinophils 0.3  0.0 - 0.7 10e9/L    Absolute Basophils 0.0  0.0 - 0.2 10e9/L    Abs Immature Granulocytes 0.0  0 - 0.03 10e9/L   AMYLASE       Component Value Range    Amylase 103  30 - 110 U/L   COMPREHENSIVE METABOLIC PANEL       Component Value Range    Sodium 144  133 - 144 mmol/L    Potassium 3.8  3.4 - 5.3 mmol/L    Chloride 105  94 - 109 mmol/L    Carbon Dioxide 23  20 - 32 mmol/L    Anion Gap 17  6 - 17 mmol/L    Glucose 173 (*) 60 - 99 mg/dL    Urea Nitrogen 13  5 - 24 mg/dL    Creatinine 0.83  0.52 - 1.04 mg/dL    GFR Estimate 74  >60 mL/min/1.7m2    GFR Estimate If Black 90  >60 mL/min/1.7m2    Calcium 9.7  8.5 - 10.4 mg/dL    Bilirubin Total 0.4  0.2 - 1.3 mg/dL    Albumin 4.3  3.9 - 5.1 g/dL    Protein Total 7.8  6.8 - 8.8 g/dL    Alkaline Phosphatase 66  40 - 150 U/L    ALT 36  0 - 50 U/L    AST 28  0 - 45 U/L   CK TOTAL       Component Value Range    CK Total 66  30 - 225 U/L   CRP INFLAMMATION       Component Value Range    CRP Inflammation 10.4 (*) 0.0 - 8.0 mg/L   LIPASE       Component Value Range    Lipase 353 (*) 20 - 250 U/L   ERYTHROCYTE SEDIMENTATION RATE AUTO       Component Value Range    Sed Rate 26 (*) 0 - 20 mm/h   ROUTINE UA WITH MICROSCOPIC REFLEX TO CULTURE       Component Value Range    Color Urine Yellow      Appearance Urine Slightly Cloudy      Glucose Urine 30 (*) NEG mg/dL    Bilirubin Urine Negative  NEG    Ketones Urine 5 (*) NEG mg/dL    Specific Gravity Urine 1.026  1.003 - 1.035    Blood Urine Trace (*) NEG    pH Urine 5.0  5.0 - 7.0 pH    Protein Albumin Urine 10 (*) NEG mg/dL    Urobilinogen mg/dL Normal  0.0 - 2.0 mg/dL    Nitrite Urine Negative  NEG    Leukocyte Esterase Urine Negative  NEG    Source Midstream Urine      WBC Urine 1  0 - 2 /HPF    RBC Urine 4 (*) 0 - 2 /HPF    Squamous Epithelial /HPF Urine <1  0 - 1 /HPF    Mucous Urine Present (*) NEG /LPF     Hyaline Casts 3 (*) 0 - 2 /LPF    Calcium Oxalate Moderate (*) NEG /HPF   COMPLEMENT C3       Component Value Range    Complement C3 143  76 - 169 mg/dL   COMPLEMENT C4       Component Value Range    Complement C4 31  15 - 50 mg/dL   HEPATITIS C ANTIBODY       Component Value Range    Hepatitis C Antibody Negative  NEG   CARDIOLIPIN ANTIBODY IGG AND IGM       Component Value Range    Cardiolipin IgG Marline    0 - 15.0 GPL    Value: <15.0      Interpretation:  Negative    Cardiolipin IgM Marline    0 - 12.5 MPL    Value: <12.5      Interpretation:  Negative   LUPUS PANEL       Component Value Range    Lupus Result    NEG    Value: Negative      (Note)      COMMENTS:      The INR is normal.      APTT is normal.  1:2 Mix is not indicated.      DRVVT Screen is normal.      Thrombin time is normal.      NEGATIVE TEST; A LUPUS ANTICOAGULANT WAS NOT DETECTED IN THIS      SPECIMEN WITHIN THE LIMITS OF THE TESTING REPERTOIRE.      If the clinical picture is strongly suggestive of an antiphospholipid      syndrome, recommend anticardiolipin and beta-2-glycoprotein (IgG and      IgM) antibody tests.      Leela Franks M.D.  [phone]      5/2/2013            INR =  0.93 N = 0.86-1.14  (No additional charge)      TT = 15.7 N = 13.0-19.0 sec  (No additional charge)            APTT'S:    Seconds      Reagent =  Stago LA      Normal  =  38      Patient  =  34      1:2 Mix  =  N/A      Reference =  31-43             DILUTE MADELINE VIPER VENOM TEST:      DRVVT Screen Ratio = 0.76 Normal = <1.21         IMMUNOGLOBULIN G SUBCLASSES       Component Value Range    IGG 1030  695 - 1620 mg/dL    IgG1 488  300 - 856 mg/dL    IgG2 325  158 - 761 mg/dL    IgG3 47  24 - 192 mg/dL    IgG4 18  11 - 86 mg/dL   SUNNY ANTIBODY PANEL       Component Value Range    Ribonucleic Protein IgG Antibody 0      Smith Antibody IgG 1      SSA (RO) Antibody IgG 4      SSB (LA) Antibody IgG 0      Scleroderma Antibody IgG 0     BETA 2 GLYCOPROTEIN  ANTIBODIES IGG IGM       Component Value Range    Beta-2-Glycopro IgG 1      Beta-2-Glycopro IgM 5     CYCLIC CIT PEPT IGG       Component Value Range    Cyclic Cit Pept IgG/IgA    <20 UNITS    Value: <20      Interpretation:  Negative   DNA DOUBLE STRANDED ANTIBODIES       Component Value Range    DNA-ds    0 - 29 IU/mL    Value: <15      Interpretation:  Negative       CT CHEST PULMONARY EMBOLISM W CONTRAST 5/20/2015 4:57 PM  HISTORY: Pain. SOB. Elevated d-dimer.   TECHNIQUE: 65 mL Isovue 370. Axial images with coronal  reconstructions.  COMPARISON: None.  FINDINGS: Calcified granuloma with surrounding scarring in the  posterolateral left upper lobe. Triangular shaped opacity at the right  lung base in the lateral right middle lobe could represent some  scarring, atelectasis or infiltrate. There is also some scarring or  atelectasis in the posteromedial right lung base. Lungs otherwise  clear.  The pulmonary arteries are well opacified. No CT evidence for acute  pulmonary embolus. No aortic dissection.  Diffuse fatty infiltration of the liver.  IMPRESSION  IMPRESSION:   1. No pulmonary embolus identified.  2. Small focus of atelectasis, infiltrate or scarring in the lateral  right middle lobe.  3. Old granulomatous disease.  4. Otherwise negative.  SILVERIO VAZQUEZ MD    Copath Report      Patient Name: FAVIO MARTINEZ   MR#: [id number]   Specimen #: [id number]   Collected: 3/15/2016   Received: 3/15/2016   Reported: 3/17/2016 13:33   Ordering Phy(s): PARVEEN ENRIQUEZ     ORIGINAL REPORT FOLLOWS ADDENDUM  ADDENDUM     TO ORIGINAL REPORT   Status: Signed Out   Date Ordered:3/18/2016   Date Complete:3/18/2016   Date Reported:3/18/2016 12:06   Signed Out By: Marianne Godfrey MD     INTERPRETATION:   This addendum is done to incorporate the results of fungal (GMS) stains   done on the specimen.  Specimen is negative for fungal organisms.  The   original final diagnosis remains unchanged.  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  Negative for malignancy(Please   see microscopic description)     Electronically signed out by:     Marianne Godfrey MD     CLINICAL HISTORY:   right orbital mass     GROSS:   The specimen is received labeled \"right orbital biopsy\" and consists of   red-tan nodule measuring 1.5 x 0.9 x 0.6 cm with one smooth side and   opposite rough side consistent with periosteum.  The specimen is   bisected revealing homogenous pale tan fleshy cut surface.  Touch   preparations are prepared, air dried and fixed, portion of specimen is   submitted in RPMI for possible lymphoma workup [facility],   ([employer], [place], [place] ).  The remainder is entirely submitted.   (Dictated by: Marianne Godfrey MD 3/15/2016 04:45 PM)     MICROSCOPIC:     Specimen consists of portion of lacrimal gland with acute and chronic   inflammation.  Focal area of microabscess formation associated with   small areas of necrosis are also present.  Inflammation is seen   extending to the periorbital adipose tissue forming panniculitis.   Specimen is negative for malignancy.  Samples sent for immunophenotyping   to [facility], ([employer], [place], [place] ) reveals no evidence   of monoclonality or aberrant antigen expression.  A GMS (fungal) stain   is pending and results will be reported as an addendum.     CPT Codes:   A: 94175-BU2, 46530-VBTEE, SOH, 65328-TIBCZ, 80106-DSBF     TESTING LAB LOCATION:   [address]  [address] [phone]     COLLECTION SITE:   [facility]   [facility]            Component      Latest Ref Rng 4/29/2016   Nucleated RBCs      0 /100 0   Absolute Neutrophil     "  1.6 - 8.3 10e9/L 8.9 (H)   Absolute Lymphocytes      0.8 - 5.3 10e9/L 3.2   Absolute Monocytes      0.0 - 1.3 10e9/L 0.8   Absolute Eosinophils      0.0 - 0.7 10e9/L 0.2   Absolute Basophils      0.0 - 0.2 10e9/L 0.1   Abs Immature Granulocytes      0 - 0.4 10e9/L 0.1   Absolute Nucleated RBC       0.0   IGG      695 - 1620 mg/dL 836   IgG1      300 - 856 mg/dL 280 (L)   IgG2      158 - 761 mg/dL 277   IgG3      24 - 192 mg/dL 35   IgG4      11 - 86 mg/dL 16   RNP Antibody IgG      0.0 - 0.9 AI <0.2 . . .   Smith SUNNY Antibody IgG      0.0 - 0.9 AI <0.2 . . .   SSA (Ro) (SUNNY) Antibody, IgG      0.0 - 0.9 AI <0.2 . . .   SSB (La) (SUNNY) Antibody, IgG      0.0 - 0.9 AI <0.2 . . .   Scleroderma Antibody Scl-70 SUNNY IgG      0.0 - 0.9 AI <0.2 . . .   Cholesterol      <200 mg/dL 154   Triglycerides      <150 mg/dL 220 (H)   HDL Cholesterol      >49 mg/dL 42 (L)   LDL Cholesterol Calculated      <100 mg/dL 67   Non HDL Cholesterol      <130 mg/dL 111   M Tuberculosis Result      NEG Negative   M Tuberculosis Antigen Value       0.00   Albumin      3.4 - 5.0 g/dL 4.3   ALT      0 - 50 U/L 30   AST      0 - 45 U/L 10   Complement C3      76 - 169 mg/dL 157   Complement C4      15 - 50 mg/dL 32   CRP Inflammation      0.0 - 8.0 mg/L <2.9   Sed Rate      0 - 20 mm/h 2   DNA-ds      <10 IU/mL 1   Cyclic Citrullinated Peptide Antibody, IgG      <7 U/mL 1   Rheumatoid Factor      <20 IU/mL <20   Proteinase 3 Antibody IgG      0.0 - 0.9 AI <0.2 . . .   Myeloperoxidase Antibody IgG      0.0 - 0.9 AI 2.5 (H)   Vitamin D Deficiency screening      20 - 75 ug/L 24   Hemoglobin A1C      4.3 - 6.0 % 7.9 (H)   ALLI by IFA IgG       1:40 (A) . . .     Component      Latest Ref Rng & Units 1/16/2021   WBC      4.0 - 11.0 10e9/L    RBC Count      3.8 - 5.2 10e12/L    Hemoglobin      11.7 - 15.7 g/dL    Hematocrit      35.0 - 47.0 %    MCV      78 - 100 fl    MCH      26.5 - 33.0 pg    MCHC      31.5 - 36.5 g/dL    RDW      10.0 - 15.0 %     Platelet Count      150 - 450 10e9/L    Diff Method          % Neutrophils      %    % Lymphocytes      %    % Monocytes      %    % Eosinophils      %    % Basophils      %    % Immature Granulocytes      %    Nucleated RBCs      0 /100    Absolute Neutrophil      1.6 - 8.3 10e9/L    Absolute Lymphocytes      0.8 - 5.3 10e9/L    Absolute Monocytes      0.0 - 1.3 10e9/L    Absolute Eosinophils      0.0 - 0.7 10e9/L    Absolute Basophils      0.0 - 0.2 10e9/L    Abs Immature Granulocytes      0 - 0.4 10e9/L    Absolute Nucleated RBC          IGG      610 - 1,616 mg/dL    IGA      84 - 499 mg/dL    IGM      35 - 242 mg/dL    Creatinine      0.52 - 1.04 mg/dL    GFR Estimate      >60 mL/min/1.73:m2    GFR Estimate If Black      >60 mL/min/1.73:m2    COVID-19 Virus PCR to [facility] - Source       Nasopharyngeal   COVID-19 Virus PCR to [facility] - Result       Not Detected   Neutrophil Cytoplasmic Antibody      <1:10 titer    Neutrophil Cytoplasmic Antibody Pattern          CD19 B Cells      6 - 27 %    Absolute CD19      107 - 698 cells/uL    Myeloperoxidase Antibody IgG      0.0 - 0.9 AI    Proteinase 3 Antibody IgG      0.0 - 0.9 AI    Vitamin D Deficiency screening      20 - 75 ug/L    Sed Rate      0 - 30 mm/h    CRP Inflammation      0.0 - 8.0 mg/L    Albumin      3.4 - 5.0 g/dL    ALT      0 - 50 U/L    AST      0 - 45 U/L      Component      Latest Ref Rng & Units 5/7/2021   WBC      4.0 - 11.0 10e9/L 8.9   RBC Count      3.8 - 5.2 10e12/L 4.89   Hemoglobin      11.7 - 15.7 g/dL 12.7   Hematocrit      35.0 - 47.0 % 39.7   MCV      78 - 100 fl 81   MCH      26.5 - 33.0 pg 26.0 (L)   MCHC      31.5 - 36.5 g/dL 32.0   RDW      10.0 - 15.0 % 13.7   Platelet Count      150 - 450 10e9/L 336   Diff Method       Automated Method   % Neutrophils      % 65.3   % Lymphocytes      % 20.7   % Monocytes      % 7.8   % Eosinophils      % 5.3   % Basophils      % 0.7   % Immature Granulocytes      % 0.2   Nucleated RBCs      0  /100 0   Absolute Neutrophil      1.6 - 8.3 10e9/L 5.8   Absolute Lymphocytes      0.8 - 5.3 10e9/L 1.8   Absolute Monocytes      0.0 - 1.3 10e9/L 0.7   Absolute Eosinophils      0.0 - 0.7 10e9/L 0.5   Absolute Basophils      0.0 - 0.2 10e9/L 0.1   Abs Immature Granulocytes      0 - 0.4 10e9/L 0.0   Absolute Nucleated RBC       0.0   IGG      610 - 1,616 mg/dL 649   IGA      84 - 499 mg/dL 199   IGM      35 - 242 mg/dL 36   Creatinine      0.52 - 1.04 mg/dL 0.96   GFR Estimate      >60 mL/min/1.73:m2 66   GFR Estimate If Black      >60 mL/min/1.73:m2 77   COVID-19 Virus PCR to [facility] - Source          COVID-19 Virus PCR to [facility] - Result          Neutrophil Cytoplasmic Antibody      <1:10 titer <1:10   Neutrophil Cytoplasmic Antibody Pattern       The ANCA IFA is <1:10.  No further testing will be performed.   CD19 B Cells      6 - 27 % <1 (L)   Absolute CD19      107 - 698 cells/uL <1 (L)   Myeloperoxidase Antibody IgG      0.0 - 0.9 AI 1.1 (H)   Proteinase 3 Antibody IgG      0.0 - 0.9 AI <0.2   Vitamin D Deficiency screening      20 - 75 ug/L 41   Sed Rate      0 - 30 mm/h 9   CRP Inflammation      0.0 - 8.0 mg/L <2.9   Albumin      3.4 - 5.0 g/dL 4.1   ALT      0 - 50 U/L 28   AST      0 - 45 U/L 18     Lab on 07/08/2022   Component Date Value Ref Range Status     Sodium 07/08/2022 143  133 - 144 mmol/L Final     Potassium 07/08/2022 3.9  3.4 - 5.3 mmol/L Final     Chloride 07/08/2022 108  94 - 109 mmol/L Final     Carbon Dioxide (CO2) 07/08/2022 25  20 - 32 mmol/L Final     Anion Gap 07/08/2022 10  3 - 14 mmol/L Final     Urea Nitrogen 07/08/2022 17  7 - 30 mg/dL Final     Creatinine 07/08/2022 1.01  0.52 - 1.04 mg/dL Final     Calcium 07/08/2022 9.3  8.5 - 10.1 mg/dL Final     Glucose 07/08/2022 103 (A) 70 - 99 mg/dL Final     GFR Estimate 07/08/2022 65  >60 mL/min/1.73m2 Final    Effective December 21, 2021 eGFRcr in adults is calculated using the 2021 CKD-EPI creatinine equation which includes age and  gender (Ryan montes al., [facility], DOI: 10.1056/JARGrb6138885)     WBC Count 07/08/2022 12.0 (A) 4.0 - 11.0 10e3/uL Final     RBC Count 07/08/2022 4.94  3.80 - 5.20 10e6/uL Final     Hemoglobin 07/08/2022 12.6  11.7 - 15.7 g/dL Final     Hematocrit 07/08/2022 39.6  35.0 - 47.0 % Final     MCV 07/08/2022 80  78 - 100 fL Final     MCH 07/08/2022 25.5 (A) 26.5 - 33.0 pg Final     MCHC 07/08/2022 31.8  31.5 - 36.5 g/dL Final     RDW 07/08/2022 13.6  10.0 - 15.0 % Final     Platelet Count 07/08/2022 350  150 - 450 10e3/uL Final     % Neutrophils 07/08/2022 66  % Final     % Lymphocytes 07/08/2022 22  % Final     % Monocytes 07/08/2022 9  % Final     % Eosinophils 07/08/2022 3  % Final     % Basophils 07/08/2022 0  % Final     % Immature Granulocytes 07/08/2022 0  % Final     NRBCs per 100 WBC 07/08/2022 0  <1 /100 Final     Absolute Neutrophils 07/08/2022 7.9  1.6 - 8.3 10e3/uL Final     Absolute Lymphocytes 07/08/2022 2.7  0.8 - 5.3 10e3/uL Final     Absolute Monocytes 07/08/2022 1.1  0.0 - 1.3 10e3/uL Final     Absolute Eosinophils 07/08/2022 0.3  0.0 - 0.7 10e3/uL Final     Absolute Basophils 07/08/2022 0.1  0.0 - 0.2 10e3/uL Final     Absolute Immature Granulocytes 07/08/2022 0.0  <=0.4 10e3/uL Final     Absolute NRBCs 07/08/2022 0.0  10e3/uL Final     Component      Latest Ref Rng & Units 8/19/2022   WBC      4.0 - 11.0 10e3/uL 12.6 (H)   RBC Count      3.80 - 5.20 10e6/uL 5.06   Hemoglobin      11.7 - 15.7 g/dL 12.8   Hematocrit      35.0 - 47.0 % 40.4   MCV      78 - 100 fL 80   MCH      26.5 - 33.0 pg 25.3 (L)   MCHC      31.5 - 36.5 g/dL 31.7   RDW      10.0 - 15.0 % 14.1   Platelet Count      150 - 450 10e3/uL 387   % Neutrophils      % 71   % Lymphocytes      % 20   % Monocytes      % 6   % Eosinophils      % 3   % Basophils      % 0   % Immature Granulocytes      % 0   NRBCs per 100 WBC      <1 /100 0   Absolute Neutrophils      1.6 - 8.3 10e3/uL 8.8 (H)   Absolute Lymphocytes      0.8 - 5.3 10e3/uL 2.5   Absolute  Monocytes      0.0 - 1.3 10e3/uL 0.8   Absolute Eosinophils      0.0 - 0.7 10e3/uL 0.4   Absolute Basophils      0.0 - 0.2 10e3/uL 0.1   Absolute Immature Granulocytes      <=0.4 10e3/uL 0.1   Absolute NRBCs      10e3/uL 0.0   Color Urine      Colorless, Straw, Light Yellow, Yellow Straw   Appearance Urine      Clear Clear   Glucose Urine      Negative mg/dL 1000 (A)   Bilirubin Urine      Negative Negative   Ketones Urine      Negative mg/dL Negative   Specific Gravity Urine      1.003 - 1.035 1.020   Blood Urine      Negative Negative   pH Urine      5.0 - 7.0 5.0   Protein Albumin Urine      Negative mg/dL Negative   Urobilinogen mg/dL      Normal, 2.0 mg/dL Normal   Nitrite Urine      Negative Negative   Leukocyte Esterase Urine      Negative Negative   RBC Urine      <=2 /HPF 1   WBC Urine      <=5 /HPF 5   Squamous Epithelial /HPF Urine      <=1 /HPF <1   MPO Marline IgG Instrument Value      <3.5 U/mL 0.7   Myeloperoxidase Antibody IgG      Negative Negative   Proteinase 3 Marline IgG Instrument Value      <2.0 U/mL <1.0   Proteinase 3 Antibody IgG      Negative Negative   Protein Random Urine      g/L 0.11   Protein Total Urine g/gr Creatinine      0.00 - 0.20 g/g Cr 0.09   Creatinine Urine      mg/dL 128   IGG      610 - 1,616 mg/dL 641   IGA      84 - 499 mg/dL 204   IGM      35 - 242 mg/dL 32 (L)   Creatinine      0.52 - 1.04 mg/dL 1.24 (H)   GFR Estimate      >60 mL/min/1.73m2 51 (L)   CD19 B Cells      6 - 27 % <1 (L)   Absolute CD19      107 - 698 cells/uL <1 (L)   Neutrophil Cytoplasmic Antibody      <1:10 <1:10   Neutrophil Cytoplasmic Antibody Pattern       The ANCA IFA is <1:10.  No further testing will be performed.   AST      0 - 45 U/L 16   ALT      0 - 50 U/L 34   Albumin      3.4 - 5.0 g/dL 4.2   CRP Inflammation      0.0 - 8.0 mg/L 9.1 (H)   Sed Rate      0 - 30 mm/hr 15   Vitamin D Deficiency screening      20 - 75 ug/L 36     Component      Latest Ref Rng & Units 1/19/2023   WBC      4.0 - 11.0  10e3/uL 16.1 (H)   RBC Count      3.80 - 5.20 10e6/uL 5.39 (H)   Hemoglobin      11.7 - 15.7 g/dL 13.4   Hematocrit      35.0 - 47.0 % 41.9   MCV      78 - 100 fL 78   MCH      26.5 - 33.0 pg 24.9 (L)   MCHC      31.5 - 36.5 g/dL 32.0   RDW      10.0 - 15.0 % 15.4 (H)   Platelet Count      150 - 450 10e3/uL 383   % Neutrophils      % 79   % Lymphocytes      % 16   % Monocytes      % 4   % Eosinophils      % 1   % Basophils      % 0   % Immature Granulocytes      % 0   NRBCs per 100 WBC      <1 /100 0   Absolute Neutrophils      1.6 - 8.3 10e3/uL 12.6 (H)   Absolute Lymphocytes      0.8 - 5.3 10e3/uL 2.6   Absolute Monocytes      0.0 - 1.3 10e3/uL 0.7   Absolute Eosinophils      0.0 - 0.7 10e3/uL 0.2   Absolute Basophils      0.0 - 0.2 10e3/uL 0.1   Absolute Immature Granulocytes      <=0.4 10e3/uL 0.1   Absolute NRBCs      10e3/uL 0.0   Sodium      136 - 145 mmol/L 137   Potassium      3.4 - 5.3 mmol/L 4.0   Chloride      98 - 107 mmol/L 98   Carbon Dioxide (CO2)      22 - 29 mmol/L 25   Anion Gap      7 - 15 mmol/L 14   Urea Nitrogen      6.0 - 20.0 mg/dL 23.6 (H)   Creatinine      0.51 - 0.95 mg/dL 1.09 (H)   Calcium      8.6 - 10.0 mg/dL 10.3 (H)   Glucose      70 - 99 mg/dL 232 (H)   Alkaline Phosphatase      35 - 104 U/L 95   AST      10 - 35 U/L 23   ALT      10 - 35 U/L 26   Protein Total      6.4 - 8.3 g/dL 7.7   Albumin      3.5 - 5.2 g/dL 4.9   Bilirubin Total      <=1.2 mg/dL 0.3   GFR Estimate      >60 mL/min/1.73m2 59 (L)   Color Urine      Colorless, Straw, Light Yellow, Yellow Light Yellow   Appearance Urine      Clear Clear   Glucose Urine      Negative mg/dL >=1000 (A)   Bilirubin Urine      Negative Negative   Ketones Urine      Negative mg/dL 10 (A)   Specific Gravity Urine      1.003 - 1.035 1.033   Blood Urine      Negative Negative   pH Urine      5.0 - 7.0 6.0   Protein Albumin Urine      Negative mg/dL Negative   Urobilinogen mg/dL      Normal, 2.0 mg/dL Normal   Nitrite Urine      Negative  Negative   Leukocyte Esterase Urine      Negative Negative   Iron      37 - 145 ug/dL 51   Iron Binding Capacity      240 - 430 ug/dL 362   Iron Sat Index      15 - 46 % 14 (L)   Parathyroid Hormone Intact      15 - 65 pg/mL 51   Calcium Ionized Whole Blood      4.4 - 5.2 mg/dL 4.9   Ferritin      11 - 328 ng/mL 70   TSH      0.30 - 4.20 uIU/mL 0.40   3 views cervical spine radiographs 2/10/2023 4:10 PM     History: neck pain; Neck pain     Comparison: MRI cervical spine 4/14/2015.     Findings:  AP, lateral, and odontoid views of the cervical spine were obtained.     Down to the level of C7 is well visualized on the lateral view(s). The  cervicothoracic junction is partially visualized.     There is no acute osseous abnormality. No prevertebral soft tissue  swelling. Normal cervical lordosis is maintained.       Moderate loss of intervertebral disc height at C6-7. Mild loss of disc  height at C5-6. Additional multilevel degenerative changes including  endplate osteophytosis and uncinate hypertrophy. Dens is obscured by  the overlying maxilla on the odontoid view.     The visualized lung apices are clear.                                                                      Impression:  Multilevel cervical degenerative changes, greatest at C6-7.     I have personally reviewed the examination and initial interpretation  and I agree with the findings.     TASHI KELLY MD      Component      Latest Ref Rng & Units 2/10/2023   WBC      4.0 - 11.0 10e3/uL 11.9 (H)   RBC Count      3.80 - 5.20 10e6/uL 5.30 (H)   Hemoglobin      11.7 - 15.7 g/dL 13.2   Hematocrit      35.0 - 47.0 % 40.7   MCV      78 - 100 fL 77 (L)   MCH      26.5 - 33.0 pg 24.9 (L)   MCHC      31.5 - 36.5 g/dL 32.4   RDW      10.0 - 15.0 % 15.3 (H)   Platelet Count      150 - 450 10e3/uL 415   % Neutrophils      % 69   % Lymphocytes      % 23   % Monocytes      % 6   % Eosinophils      % 2   % Basophils      % 0   % Immature Granulocytes      % 0    NRBCs per 100 WBC      <1 /100 0   Absolute Neutrophils      1.6 - 8.3 10e3/uL 8.1   Absolute Lymphocytes      0.8 - 5.3 10e3/uL 2.7   Absolute Monocytes      0.0 - 1.3 10e3/uL 0.8   Absolute Eosinophils      0.0 - 0.7 10e3/uL 0.2   Absolute Basophils      0.0 - 0.2 10e3/uL 0.0   Absolute Immature Granulocytes      <=0.4 10e3/uL 0.0   Absolute NRBCs      10e3/uL 0.0   Sodium      136 - 145 mmol/L 138   Potassium      3.4 - 5.3 mmol/L 4.1   Chloride      98 - 107 mmol/L 99   Carbon Dioxide (CO2)      22 - 29 mmol/L 23   Anion Gap      7 - 15 mmol/L 16 (H)   Urea Nitrogen      6.0 - 20.0 mg/dL 24.6 (H)   Creatinine      0.51 - 0.95 mg/dL 1.07 (H)   Calcium      8.6 - 10.0 mg/dL 10.5 (H)   Glucose      70 - 99 mg/dL 205 (H)   Alkaline Phosphatase      35 - 104 U/L 86   AST      10 - 35 U/L 26   ALT      10 - 35 U/L 30   Protein Total      6.4 - 8.3 g/dL 7.6   Albumin      3.5 - 5.2 g/dL 4.8   Bilirubin Total      <=1.2 mg/dL 0.2   GFR Estimate      >60 mL/min/1.73m2 61   MPO Marline IgG Instrument Value      <3.5 U/mL 0.8   Myeloperoxidase Antibody IgG      Negative Negative   Proteinase 3 Marline IgG Instrument Value      <2.0 U/mL <1.0   Proteinase 3 Antibody IgG      Negative Negative   IGG      610 - 1,616 mg/dL 680   IGA      84 - 499 mg/dL 197   IGM      35 - 242 mg/dL 30 (L)   CD19 B Cells      6 - 27 % <1 (L)   Absolute CD19      107 - 698 cells/uL <1 (L)   Neutrophil Cytoplasmic Antibody      <1:10 <1:10   Neutrophil Cytoplasmic Antibody Pattern       The ANCA IFA is <1:10.  No further testing will be performed.   % Retic      0.5 - 2.0 % 1.4   Absolute Retic      0.025 - 0.095 10e6/uL 0.073   CRP Inflammation      <5.00 mg/L 6.67 (H)   Sed Rate      0 - 30 mm/hr 12   Lipase      13 - 60 U/L 26     ROS: A comprehensive ROS was done. Positives are per HPI.      HISTORY REVIEW:  Past Medical History:   Diagnosis Date     Abnormal glandular Papanicolaou smear of cervix 1992     Allergic rhinitis     Allergy, airborne  subst     Arthritis      ASCVD (arteriosclerotic cardiovascular disease)      Chronic pain      Chronic pancreatitis (H)     idiopathic, Tx: PPI, antioxidants, pancreatic enzymes     Common migraine      Coronary artery disease      Costochondritis      Difficulty in walking(719.7)      Dyspnea on exertion      Ectasia, mammary duct     [facility], persistent nipple discharge     Elevated fasting glucose      Gastro-oesophageal reflux disease      Granulomatosis with polyangiitis (H)      Hernia      History of angina      Hyperlipidemia LDL goal < 100      Hypertension goal BP (blood pressure) < 140/90     Essential hypertension     Iron deficiency anemia      Ischemic cardiomyopathy      Menorrhagia      Migraine headaches      Mild persistent asthma      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 Matias, neg w/u, Rx'd w/prednisone     Ventral hernia, unspecified, without mention of obstruction or gangrene 2012     Past Surgical History:   Procedure Laterality Date     C/SECTION, LOW TRANSVERSE  1996         CARDIAC SURGERY      cardiac stent- recent in 16; 4 other stents     DILATION AND CURETTAGE N/A 2016    Procedure: DILATION AND CURETTAGE;  Surgeon: Nahed Butler MD;  Location: [facility]     ESOPHAGOSCOPY,  GASTROSCOPY, DUODENOSCOPY (EGD), COMBINED N/A 2022    Procedure: ESOPHAGOGASTRODUODENOSCOPY, WITH BIOPSY;  Surgeon: Enzo Sesay MD;  Location:  GI     [facility] UGI ENDOSCOPY W EUS  2008    Dr. Pastrana, possible chronic pancreatitis, fatty liver     HERNIA REPAIR  2012    done at [facility]     INSERT INTRAUTERINE DEVICE N/A 2016    Procedure: INSERT INTRAUTERINE DEVICE;  Surgeon: Nahed Butler MD;  Location: [facility] OR     INT UTERINE FIBRIOD EMBOLIZATION  10/29/2014     LAPAROSCOPIC CHOLECYSTECTOMY  2008    Dr. Arnol GRUBBS TX, CERVICAL  1992    s/p LEEP, done at [facility] in [place].     ORBITOTOMY Right 03/15/2016    Procedure: ORBITOTOMY;  Surgeon: Myron Cyr MD;  Location:  [facility]     ORBITOTOMY Right 2017    Procedure: ORBITOTOMY;  RIGHT ORBITOTOMY AND BIOPSY;  Surgeon: Charis Holbrook MD;  Location: [facility]     REPAIR PTOSIS Right 2017    Procedure: REPAIR PTOSIS;  RIGHT UPPER LID PTOSIS REPAIR;  Surgeon: Myron Cyr MD;  Location: [facility]     UPPER GI ENDOSCOPY  10/21/2008    mild gastritis, Dr. Rocky CALDERA ECHO HEART XTHORACIC,COMPLETE, W/O DOPPLER  2004    [facility] WNL, EF 60%     Family History   Problem Relation Age of Onset     Heart Disease Father 50        heart attack     Cerebrovascular Disease Father      Diabetes Father      Hypertension Father      Depression Father      C.A.D. Father      Hypertension Mother      Arthritis Mother      Heart Disease Mother         long qt syndrome     Thyroid Disease Mother      C.A.D. Mother      Heart Disease Brother 15        MI at 15, 16.      Diabetes Maternal Uncle      Hypertension Maternal Aunt      Hypertension Maternal Uncle      Arthritis Brother         he passed away, had severe arthritis at age 11     Arthritis Maternal Uncle      Eye Disorder Maternal Uncle         cataracts     Neurologic Disorder Sister         migraines     Neurologic Disorder  Sister         migraines     Respiratory Son         asthma     Cerebrovascular Disease Maternal Uncle      C.A.D. Brother      Family History Negative Other         neg for RA, SLE     Unknown/Adopted No family hx of         unknown neurological issues in her family, mother was adopted     Skin Cancer No family hx of         No known family hx of skin cancer     Social History     Socioeconomic History     Marital status: Single     Spouse name: Not on file     Number of children: 1     Years of education: Not on file     Highest education level: Not on file   Occupational History     Employer: NONE    Tobacco Use     Smoking status: Some Days     Packs/day: 0.20     Years: 1.00     Pack years: 0.20     Types: Cigarettes     Last attempt to quit: 2016     Years since quittin.1     Smokeless tobacco: Never   Substance and Sexual Activity     Alcohol use: No     Alcohol/week: 0.0 standard drinks     Drug use: No     Sexual activity: Not Currently   Other Topics Concern     Parent/sibling w/ CABG, MI or angioplasty before 65F 55M? Yes   Social History Narrative    Balanced Diet - sometimes    Osteoporosis Prevention Measures - Dairy servings per day: 2 servings weekly    Regular Exercise -  Yes Describe walking 4 times a week    Dental Exam - NO    Seatbelts used - Yes    Self Breast Exam - Yes    Abuse: Current or Past (Physical, Sexual or Emotional)- No    Do you have any concerns about STD's -  No    Do you feel safe in your environment - No    Guns stored in the home - No    Sunscreen used - Yes    Lipids -  YES - Date:     Glucose -  YES - Date:     Eye Exam - YES - Date: one year ago    Colon Cancer Screening - No    Hemoccults - NO    Pap Test -  YES - Date: , remote history of LEEP    Mammography - YES - Date: last spring, would like to discuss, needs a referral to [facility]    DEXA - NO    Immunizations reviewed and up to date - Yes, last td given in  or       Social Determinants of Health     Financial Resource Strain: Not on file   Food Insecurity: Not on file   Transportation Needs: Not on file   Physical Activity: Not on file   Stress: Not on file   Social Connections: Not on file   Intimate Partner Violence: Not on file   Housing Stability: Not on file     Patient Active Problem List   Diagnosis     Seasonal affective disorder (H)     Allergic rhinitis     PCOS (polycystic ovarian syndrome)     Moderate persistent asthma     Chronic pancreatitis (H)     Hypertension goal BP (blood pressure) < 140/90     Common migraine     NSTEMI (non-ST elevated myocardial infarction) (H)     Hyperlipidemia LDL goal <70     ASCVD (arteriosclerotic cardiovascular disease)     GERD (gastroesophageal reflux disease)     Ischemic cardiomyopathy     Hypertensive heart disease     Uterine leiomyoma     Iron deficiency anemia     Costochondritis     Vitamin D deficiency     Breast cancer screening     Spinal stenosis in cervical region     Fibromyalgia     Seronegative rheumatoid arthritis (H)     Type 2 diabetes, HbA1C goal < 8% (H)     Type 2 diabetes mellitus with other specified complication (H)     Hyperlipidemia associated with type 2 diabetes mellitus (H)     Hypertension associated with diabetes (H)     Overweight with body mass index (BMI) 25.0-29.9     Tobacco use disorder     Telogen effluvium     CAD S/P percutaneous coronary angioplasty     Status post coronary angiogram     ANCA-associated vasculitis     Wegener's vasculitis     Type 1 diabetes mellitus with complications (H)     Chest discomfort     Urinary frequency     Abdominal pain, epigastric     Hypokalemia     Leukocytosis, unspecified type     Acute pancreatitis, unspecified complication status, unspecified pancreatitis type       Pregnancy Hx: She is . All miscarriages were in first trimester. H/o OC use in the past. 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    PMHx, FHx, SHx were reviewed,  unchanged.    Outpatient Encounter Medications as of 3/9/2023   Medication Sig Dispense Refill     acetaminophen (TYLENOL) 325 MG tablet Take 1-2 tablets (325-650 mg) by mouth every 6 hours as needed for pain (headache) 250 tablet 4     ADVAIR DISKUS 250-50 MCG/ACT inhaler        albuterol (2.5 MG/3ML) 0.083% neb solution INL 1 VIAL VIA NEBULIZATION Q 4 TO 6 HOURS PRN  1     albuterol (PROAIR HFA, PROVENTIL HFA, VENTOLIN HFA) 108 (90 BASE) MCG/ACT inhaler Inhale 2 puffs into the lungs every 6 hours as needed for shortness of breath / dyspnea or wheezing 3 Inhaler 1     BANOPHEN 25 MG tablet Take 25 mg by mouth At Bedtime       blood glucose (NO BRAND SPECIFIED) lancets standard Use to test blood sugar 1-4 times daily or as directed. 400 each 3     blood glucose (NO BRAND SPECIFIED) test strip Use to test blood sugar 1-4 times daily or as directed. 400 strip 3     Blood Pressure Monitor KIT 1 each daily Monitor home blood pressure as instructed by physician.  Dispense [place] blood pressure kit. 1 kit 0     calcium carbonate (TUMS) 500 MG chewable tablet Take 3-4 tablets (1,500-2,000 mg) by mouth daily as needed 90 tablet 3     clopidogrel (PLAVIX) 75 MG tablet Take 1 tablet (75 mg) by mouth daily . 30-day refills only. 30 tablet 11     COMPRESSION STOCKINGS 2 each daily Apply one 10-15 mmHg compression stocking to each lower extgmierty during the day and remove before bedtime. 4 each 2     cyclobenzaprine (FLEXERIL) 10 MG tablet Take 1 tablet (10 mg) by mouth 2 times daily as needed for muscle spasms 60 tablet 3     dicyclomine (BENTYL) 20 MG tablet TAKE 1 TABLET(20 MG) BY MOUTH FOUR TIMES DAILY AS NEEDED. 60 tablet 0     diphenhydrAMINE (BENADRYL) 25 MG capsule Take 1 capsule (25 mg) by mouth nightly as needed for itching or allergies 30 capsule 2     empagliflozin (JARDIANCE) 10 MG TABS tablet Take 1 tablet (10 mg) by mouth daily 90 tablet 3     EPIPEN 2-RIKY 0.3 MG/0.3ML injection INJECT 0.3 MG INTO THE MUSCLE PRF  ANAPHYALAXIS  0     Ergocalciferol (VITAMIN D2) 50 MCG (2000 UT) TABS Take 2,000 Units by mouth daily 90 tablet 1     esomeprazole (NEXIUM) 40 MG DR capsule Take 1 capsule (40 mg) by mouth 2 times daily Take 30-60 minutes before eating. 180 capsule 0     famotidine (PEPCID) 20 MG tablet Take 1 tablet (20 mg) by mouth 2 times daily as needed (abdominal pain) 20 tablet 0     fexofenadine (ALLEGRA) 180 MG tablet Take 1 tablet by mouth daily as needed. 90 tablet 3     fluconazole (DIFLUCAN) 200 MG tablet Take 1 tablet (200 mg) by mouth daily 7 tablet 0     fluticasone (FLONASE) 50 MCG/ACT nasal spray SHAKE LIQUID AND USE 1 TO 2 SPRAYS IN EACH NOSTRIL EVERY DAY       folic acid (FOLVITE) 1 MG tablet Take 1 tablet (1 mg) by mouth daily 90 tablet 3     hydrocortisone (CORTAID) 1 % external cream Apply topically 2 times daily 60 g 1     insulin glargine (LANTUS PEN) 100 UNIT/ML pen Inject 75 Units Subcutaneous every morning Or as directed. 75 mL 3     insulin pen needle (BD MARCK U/F) 32G X 4 MM miscellaneous USE DAILY OR AS DIRECTED 300 each 3     ketoconazole (NIZORAL) 2 % shampoo Apply topically daily as needed       levocetirizine (XYZAL) 5 MG tablet Take 5 mg by mouth daily       levofloxacin (LEVAQUIN) 500 MG tablet Take 1 tablet (500 mg) by mouth daily 7 tablet 0     levonorgestrel (MIRENA) 20 MCG/DAY IUD 1 each by Intrauterine route once       lisinopril-hydrochlorothiazide (ZESTORETIC) 20-25 MG tablet Take 1 tablet by mouth daily . 30-day refills only. 30 tablet 11     magnesium 250 MG tablet TAKE 1 TABLET(250 MG) BY MOUTH TWICE DAILY 60 tablet 3     metFORMIN (GLUCOPHAGE-XR) 500 MG 24 hr tablet Take 4 tablets (2,000 mg) by mouth daily (with dinner) 120 tablet 11     metoprolol succinate ER (TOPROL XL) 100 MG 24 hr tablet Take 1 tablet (100 mg) by mouth daily . 30-day refills only. 30 tablet 11     Multiple Vitamin (TAB-A-GERALD) TABS Take 1 tablet by mouth daily 90 tablet 1     nitroGLYcerin (NITROSTAT) 0.4 MG  sublingual tablet Place 1 tablet (0.4 mg) under the tongue every 5 minutes as needed for chest pain . After 3 doses, if pain persists call 911. 25 tablet 3     olopatadine (PATANOL) 0.1 % ophthalmic solution        pravastatin (PRAVACHOL) 40 MG tablet Take 1 tablet (40 mg) by mouth daily . 30-day refills only. 30 tablet 11     prochlorperazine (COMPAZINE) 5 MG tablet Take 1 tablet (5 mg) by mouth every 6 hours as needed for nausea or vomiting 60 tablet 3     sennosides (SENOKOT) 8.6 MG tablet 1-2 tabs a day as needed for constipation 60 tablet 1     spironolactone (ALDACTONE) 25 MG tablet Take 1 tablet (25 mg) by mouth daily . Take one additional 0.5 tab daily as needed for weight gain. 45 day refills. 45 tablet 11     sucralfate (CARAFATE) 1 GM tablet Take 1 tablet (1 g) by mouth 4 times daily 120 tablet 3     terbinafine (LAMISIL) 1 % external cream Apply topically 2 times daily 42 g 1     terconazole (TERAZOL 3) 80 MG vaginal suppository Place 1 suppository (80 mg) vaginally At Bedtime 3 suppository 1     traMADol (ULTRAM) 50 MG tablet TAKE 1 TABLET(50 MG) BY MOUTH EVERY 8 HOURS AS NEEDED FOR SEVERE PAIN 60 tablet 3     vitamin D2 (ERGOCALCIFEROL) 29308 units (1250 mcg) capsule Take 1 capsule (50,000 Units) by mouth every 7 days 4 capsule 0     [DISCONTINUED] azelastine (ASTELIN) 0.1 % nasal spray USE 1 TO 2 SPRAYS IN EACH NOSTRIL TWICE DAILY AS NEEDED (Patient not taking: Reported on 1/19/2023)       [DISCONTINUED] estradiol (VAGIFEM) 10 MCG TABS vaginal tablet Place 1 tablet (10 mcg) vaginally twice a week (Patient not taking: Reported on 2/10/2023) 8 tablet 11     [DISCONTINUED] vitamin B-2 (RIBOFLAVIN) 100 MG TABS tablet Take 2 tablets (200 mg) by mouth daily (Patient not taking: Reported on 1/19/2023) 30 tablet 11     No facility-administered encounter medications on file as of 3/9/2023.       Allergies   Allergen Reactions     Amoxicillin-Pot Clavulanate      Artificial Sweetner (Informational Only)  Other  (See Comments)     Increased headache     Augmentin      Unknown possible hives and edema     Azithromycin      Diatrizoate Other (See Comments)     Pt wants this listed because she is allergic to shellfish      Imitrex [Sumatriptan]      Severe face/neck/chest tightness and flushing side effects      Penicillins Hives     Unknown      Pork Allergy      Stomach pain, cramping, diarrhea, itching, nausea and headaches     Shellfish Allergy Hives and Swelling     Sulfa Drugs Hives and Swelling     Zithromax [Azithromycin Dihydrate] Swelling     Unknown        Ph.E:    /80   Pulse 76   Temp 98.8  F (37.1  C)   Wt 77 kg (169 lb 12.8 oz)   SpO2 96%   BMI 31.06 kg/m      GA: NAD, cooperative, son present  HEENT: nl conj, sclera, EOMI. No campos-orbital edema  Chest: CTAB  CV: no M/R/G, RRR  Abdomen: soft, NT  MSK: + active synovitis and joint tenderness over B/L 2nd/3rd MCPs  Skin: no rash  Neuro: non focal  Psych: nl affect    Assessment/Plan:    1-Seropositive non-erosive RA (arthritis, AM stiffness, high CRP, RF 26 but re-check neg 3/2015 on HCQ) Dx 5/2013, FMS, new pleuritic CP 3/20-15-5/2015 resolved on steroids. She is on  mg bid since 5/2014. She tolerates it well and it's helping some but not enough to control all her pain. Continued having flare ups of joint pain, could be GPA related. Some pain is due to FMS.My impression is that her arthralgias are a combination of RA/ IA sec GPA, fibromyalgia and chronic pain.     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 Prednisone was not a good option for her given weight gain, diabetes. She tried and did not tolerate MTX, AZA.    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 Repeat MPO was positive in 6/2017.    She is s/p lateral orbitotomy and debulking orbital mass right  eye on 9/26/18 with Dr. Shah and Dr. Valdez at [facility]. 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     She is on rituximab since 12/12/2018 with great response, minor allergic reaction which could be managed by pre-meds and extra steroid dose. Developed high BG and vaginal yeast infections after solumedrol premed. Treated candida with fluconazole. Will decrease solumerdol dose to IV 80 mg prior to receiving rituximab. 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 However, feels Rituximab effects wear off around 4 months. According to ACR guidelines can give every 4-6 months. Pt elected to received this upcoming September which will be approximately 5 months from last dose. Repeat Orbit CT in September 2021 demonstrated improvement.     Last visit in April 2022-- Recommended evusheld given b cell depletion tx as rituximab blocks the response to covid vaccine, she is concerned about side effects. I advised her to discuss with [facility] pharmacist.      1/25/2023: Janine has been ill since Dx of COVID on 12/17/2022. Her most recent labs were reviewed, stable vasculitis labs in 8/2022 with CD19<1, neg vasculitis markers. In 1/2023, no anemia and nl thyroid function test. I ordered vit D as add on. 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 She does need to follow up on abnormal thyroid US and mammogram as well. Our plan for ANCA vasculitis was to give rituximab 1 gram e0shmlla as benefit did not last 6 months with last rituximab on 9/7/2022, but today we both agreed to give next dose in March after 6 months to let her body heal from COVID. I will see her in person, in early march to determine if it is ok to give another dose of rituxiamb. 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  Also ordered shower chair, commode per her request given significant fatigue, body pain.    Today 3/9/2023: S/p last rituximab 1 gram on 9/7/2022. 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 Stable labs and eye inflammation.    2-Fat pads. She was seen by endocrinology and cushing was ruled out in 4/2014. Was advised to do f/u for enlarged thyroid/thyroid nodules.  3-Hair loss. Continue with dermatology  4-Chronic pain. F/U with pain clinic  5-Vit D deficiency. Was replaced with vit D 50, 000 units po qwk x 12 wk then 2000 units every day  6-?HCQ toxicity on OCT 7/2021, now off HCQ, could explain increased arthralgia  7- Chronic Headaches. Symptoms worsen after taking zofran. Has received compazine in hospital in the past without adverse effect. Will have patient trial Compazine       Today's plan:    Spine referral for abnormal C spine (DJD) in 2/2023.    Rituximab 1 gram one dose only this month    Labs in May 2023    Follow up eye exam 8/2023    Return in person in about 5-6 months    Orders Placed This Encounter   Procedures     AST     ALT     Myeloperoxidase and Proteinase 3 Panel     Albumin level     Creatinine     CRP inflammation     UA with Microscopic reflex to Culture     Protein  random urine     Creatinine random urine     Erythrocyte sedimentation rate auto     CD19 B Cell Count     ANCA IgG by IFA with Reflex to Titer     Immunoglobulins A G and M     Spine  Referral     CBC with Platelets & Differential       Majo Mckinnon MD

## 2023-03-09 NOTE — PROGRESS NOTES
"  Assessment & Plan     Epigastric pain  EGD cont PPI; consider gastric empty  - Adult GI  Referral - Procedure Only; Future    Loose stools  Colonoscopy as planned, also, stool tests.   - Adult GI  Referral - Procedure Only; Future  - Stool Enteric Bacteria and Virus Panel  - Stool Ova and Parasite; Future  - Cryptosporidium/Giardia Immunoassay; Future  - CBC with platelets and differential; Future    Abdominal bloating  Sibo discussed, test.   - Adult GI  Referral - Procedure Only; Future  - CBC with platelets and differential; Future    Right wrist pain  Helps  - WRIST SPLINT    Urine frequency  Recheck  - UA Macro with Reflex to Micro and Culture - lab collect; Future    Diverticulosis of large intestine without hemorrhage  Discussed concept, sx if problem occurs    Leukocytosis, unspecified type  If neg stool tests we discussed e consult hematology        48 minutes spent on the date of the encounter doing chart review, history and exam, documentation and further activities per the note    BMI:   Estimated body mass index is 31.05 kg/m  as calculated from the following:    Height as of this encounter: 1.575 m (5' 2\").    Weight as of this encounter: 77 kg (169 lb 12.1 oz).     Return in about 1 month (around 4/9/2023).    Kash Solano MD  [facility] [place]    Adrienne Mehta is a 57 year old, presenting for the following health issues:  RECHECK (Follow up.)      HPI Here w/ son.  1-still upper abdomen pain. A year ago, ulcers on EGD; on PPI since. Wt overall stable. Some nausea. Seems to fill up easily/quickly; no known gastroparesis, does have DM. Upon awakening in am, or after any po intake, has urge to stool-no blood or melena of stool but it varies from normal consistency to loose or even watery. Recalls years ago being told has IBS.   2-self reported sx c/w rectal prolapse, has colonoscopy next month. Given change bowle habits, we will do " stool tests for infection, and I've also in visit reached out to colonoscopy MD to see if can evaluate bowel for this change; she does have vasculitis, in past never known to affect bowel function.   3-she also notes very bloated and gassy, in setting these sx, and loose stools, we discuss concept SIBO and she agrees to be tested  4-DM: see endo soon for this and also for thyroid nodules   5-recent UTI, sx mainly resolved, no blood or dysuria some frequency  6-R wrist pain, chornic, better w/ velcro splint, wearing out  7-discuss concept diverticulosis; no sx, noted on ct, she wished to review, discussed problems that can occur  8-leukocytosis; no known infection, does have the loose stools we will check stool tests and colonoscopy. Some upper abdomen pain, will redo EGD from last March. Currently no resp or  sx. Not on steroids. P smear reviewed.  9-just saw rheum, notes not back yet, per pt no change, doing rituximab soon  Past Medical History:   Diagnosis Date     Abnormal glandular Papanicolaou smear of cervix 1992     Allergic rhinitis     Allergy, airborne subst     Arthritis      ASCVD (arteriosclerotic cardiovascular disease)      Chronic pain      Chronic pancreatitis (H)     idiopathic, Tx: PPI, antioxidants, pancreatic enzymes     Common migraine      Coronary artery disease      Costochondritis      Difficulty in walking(719.7)      Dyspnea on exertion      Ectasia, mammary duct     [facility], persistent nipple discharge     Elevated fasting glucose      Gastro-oesophageal reflux disease      Granulomatosis with polyangiitis (H)      Hernia      History of angina      Hyperlipidemia LDL goal < 100      Hypertension goal BP (blood pressure) < 140/90     Essential hypertension     Iron deficiency anemia      Ischemic cardiomyopathy      Menorrhagia      Migraine headaches      Mild persistent asthma      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 Matias, neg w/u, Rx'd w/prednisone     Ventral hernia, unspecified, without mention of obstruction or gangrene 2012     Past Surgical History:   Procedure Laterality Date     C/SECTION, LOW TRANSVERSE  1996         CARDIAC SURGERY      cardiac stent- recent in 16; 4 other stents     DILATION AND CURETTAGE N/A 2016    Procedure: DILATION AND CURETTAGE;  Surgeon: Nahed Butler MD;  Location: [facility]     ESOPHAGOSCOPY, GASTROSCOPY, DUODENOSCOPY (EGD), COMBINED N/A 2022    Procedure: ESOPHAGOGASTRODUODENOSCOPY, WITH BIOPSY;  Surgeon: Enzo Sesay MD;  Location: [facility]     HC UGI ENDOSCOPY W EUS  2008    Dr. Pastrana, possible chronic pancreatitis, fatty liver     HERNIA REPAIR  2012    [place] [facility] [place]     INSERT INTRAUTERINE DEVICE N/A 2016    Procedure: INSERT INTRAUTERINE DEVICE;  Surgeon: Nahed Butler MD;  Location: [facility]     INT UTERINE FIBRIOD EMBOLIZATION  10/29/2014     LAPAROSCOPIC CHOLECYSTECTOMY  2008    Dr. Arnol GRUBBS TX, CERVICAL  1992    s/p HUMERA, [place] [facility] [place].     ORBITOTOMY Right 03/15/2016    Procedure: ORBITOTOMY;  Surgeon: Myron Cyr MD;  Location: [facility]     ORBITOTOMY Right 2017    Procedure: ORBITOTOMY;  RIGHT ORBITOTOMY AND BIOPSY;  Surgeon: Charis Holbrook MD;   Location:  [facility]     REPAIR PTOSIS Right 11/17/2017    Procedure: REPAIR PTOSIS;  RIGHT UPPER LID PTOSIS REPAIR;  Surgeon: Myron Cyr MD;  Location:  [facility]     UPPER GI ENDOSCOPY  10/21/2008    mild gastritis, Dr. Rocky [facility]A ECHO HEART XTHORACIC,COMPLETE, W/O DOPPLER  02/04/2004    [facility] WNL, EF 60%     Current Outpatient Medications   Medication     acetaminophen (TYLENOL) 325 MG tablet     ADVAIR DISKUS 250-50 MCG/ACT inhaler     albuterol (2.5 MG/3ML) 0.083% neb solution     albuterol (PROAIR HFA, PROVENTIL HFA, VENTOLIN HFA) 108 (90 BASE) MCG/ACT inhaler     BANOPHEN 25 MG tablet     blood glucose (NO BRAND SPECIFIED) lancets standard     blood glucose (NO BRAND SPECIFIED) test strip     Blood Pressure Monitor KIT     calcium carbonate (TUMS) 500 MG chewable tablet     clopidogrel (PLAVIX) 75 MG tablet     COMPRESSION STOCKINGS     cyclobenzaprine (FLEXERIL) 10 MG tablet     dicyclomine (BENTYL) 20 MG tablet     diphenhydrAMINE (BENADRYL) 25 MG capsule     empagliflozin (JARDIANCE) 10 MG TABS tablet     EPIPEN 2-RIKY 0.3 MG/0.3ML injection     Ergocalciferol (VITAMIN D2) 50 MCG (2000 UT) TABS     esomeprazole (NEXIUM) 40 MG DR capsule     famotidine (PEPCID) 20 MG tablet     fexofenadine (ALLEGRA) 180 MG tablet     fluconazole (DIFLUCAN) 200 MG tablet     fluticasone (FLONASE) 50 MCG/ACT nasal spray     folic acid (FOLVITE) 1 MG tablet     hydrocortisone (CORTAID) 1 % external cream     insulin glargine (LANTUS PEN) 100 UNIT/ML pen     insulin pen needle (BD MARCK U/F) 32G X 4 MM miscellaneous     ketoconazole (NIZORAL) 2 % shampoo     levocetirizine (XYZAL) 5 MG tablet     levofloxacin (LEVAQUIN) 500 MG tablet     levonorgestrel (MIRENA) 20 MCG/DAY IUD     lisinopril-hydrochlorothiazide (ZESTORETIC) 20-25 MG tablet     magnesium 250 MG tablet     metFORMIN (GLUCOPHAGE-XR) 500 MG 24 hr tablet     metoprolol succinate ER (TOPROL XL) 100 MG 24 hr tablet     Multiple Vitamin (TAB-A-GERALD)  "TABS     nitroGLYcerin (NITROSTAT) 0.4 MG sublingual tablet     olopatadine (PATANOL) 0.1 % ophthalmic solution     pravastatin (PRAVACHOL) 40 MG tablet     prochlorperazine (COMPAZINE) 5 MG tablet     sennosides (SENOKOT) 8.6 MG tablet     spironolactone (ALDACTONE) 25 MG tablet     sucralfate (CARAFATE) 1 GM tablet     terbinafine (LAMISIL) 1 % external cream     terconazole (TERAZOL 3) 80 MG vaginal suppository     traMADol (ULTRAM) 50 MG tablet     vitamin D2 (ERGOCALCIFEROL) 49480 units (1250 mcg) capsule     No current facility-administered medications for this visit.     Allergies   Allergen Reactions     Amoxicillin-Pot Clavulanate      Artificial Sweetner (Informational Only)  Other (See Comments)     Increased headache     Augmentin      Unknown possible hives and edema     Azithromycin      Diatrizoate Other (See Comments)     Pt wants this listed because she is allergic to shellfish      Imitrex [Sumatriptan]      Severe face/neck/chest tightness and flushing side effects      Penicillins Hives     Unknown      Pork Allergy      Stomach pain, cramping, diarrhea, itching, nausea and headaches     Shellfish Allergy Hives and Swelling     Sulfa Drugs Hives and Swelling     Zithromax [Azithromycin Dihydrate] Swelling     Unknown            Review of Systems         Objective    /80   Pulse 76   Temp 98.8  F (37.1  C)   Ht 1.575 m (5' 2\")   Wt 77 kg (169 lb 12.1 oz)   SpO2 96%   BMI 31.05 kg/m    Body mass index is 31.05 kg/m .  Physical Exam   GENERAL: healthy, alert and no distress  NECK: no adenopathy, no asymmetry, masses, or scars and thyroid normal to palpation  RESP: lungs clear to auscultation - no rales, rhonchi or wheezes  CV: regular rate and rhythm, normal S1 S2, no S3 or S4, no murmur, click or rub, no peripheral edema and peripheral pulses strong  ABDOMEN: soft, mild upper tender, no hepatosplenomegaly, no masses and bowel sounds normal  MS: no gross musculoskeletal defects noted, no " edema

## 2023-03-09 NOTE — TELEPHONE ENCOUNTER
Dr. Solano entered EGD order, to be done same time as colonoscopy. He also entered HBT order. Patient currently scheduled w. Dr. Yeager, but does not have room to add EGD. Will need to be moved to another day. Staff message below:    Lashawn Yeager MD  P Endoscopy Scheduling Pool  Félix Gonzalez for the quick reply. I don't actually know this patient, so I think the procedures could be done by any GI provider. I think the PCP just wants to make sure both procedures happen together.      If the procedure does get scheduled with another provider, can you let me know who it is and I can pass along some patient info her PCP gave me.     ThanksMary               Previous Messages       ----- Message -----   From: Sharri Collado   Sent: 3/9/2023   2:49 PM CST   To: Lashawn Yeager MD     Your next opening is 5/16. If it can be with another provider, we can do it the first week in May, but your schedule is completely booked the day that she is scheduled with you.     ThanksCarlos   Endoscopy Scheduling Team   ----- Message -----   From: Lashawn Yeager MD   Sent: 3/9/2023   2:02 PM CST   To: Endoscopy Scheduling Pool          This patient's PCP sent me an Epic message inquiring as to whether an EGD could be added on to the patient's already planned colonoscopy (in April).      They have placed an order.      It seems appropriate for her to get an EGD, but will defer to you all on the scheduling team as to whether there is time on the day she is currently scheduled or if she'll have to be moved to another date to accommodate the additional procedure.      Thanks in advanceMary

## 2023-03-09 NOTE — PROGRESS NOTES
Rheumatology F/U In Person Visit Note    Last visit note: 1/25/2023    Today's visit date: 3/9/2023    Reason for in person visit: F/U GPA    HPI     Ms. 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 She is s/p tx with HCQ since 5/2014, now off due to abnormal eye exam. On rituximab since 12/20218 with great response. Previously tried and did not tolerate MTX, AZA.    She had lateral orbitotomy and debulking orbital mass right eye on 9/26/18 with Dr. Shah and Dr. Valdez at [place]. 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 There were no complications according to the op note.    Today 3/9/2023: Reports pain/swelling of her hands. S/p last rituximab 1 gram on 9/7/2022.       1/25/2023: Janine is doing a phone visit for follow up, was feeling ill and switched in person to phone visit. She got sick around Thanksgiving, saw her PCP on 12/17, was tested positive for COVID, it took a longtime to feel better, did not take paxlovid as it was already past 2 weeks. Since then, she has not been feeling well. Has headaches, body ache, her eyes especially R eye look pink, they burn and itch a lot. Has sense of smell but can not taste her food. She is very tired, hardly leaves her house. Last time, left house for doctor visit, was tired and sore. Has tingling and pinprick feeling over arms and legs. Has upcoming follow up with PCP on 2/10. She had thyroid US for thyroid nodules. She had abnormal screening mammogram on 1/9, will go back for diagnostic mammogram and US of L breast on 1/31.    08/19/2022  Reports fatigue and headaches. Headache worsens after taking Zofran for nausea. Joint symptoms come and go. Last rituximab on 4/6/22. Rituximab is helpful but feels like it wears off prior to the 6 months. Next appointment scheduled for October 2022. Develops intermittent vaginal yeast infection and thrush  related to rising blood glucose. Right eye without symptoms. No new fevers, chills, skin changes. Son is wondering if she takes herbal supplements for headaches will this interact with any of her medications. Scheduled to meet with pharmacist today.        4/22/2022:   Each rituximab infusion causes vaginal yeast infection and thrush related to rise in BG. Her BG has stayed in higher level since last rituximab. Has more ache and pain, myalgia and arthralgia. Recently has had headaches with high BP. Had last rituximab 1000 mg on 4/6.Her R eye is itching and swelling up.  Today, her BG is 177.Has gained 20 lbs since Feb 2022. Had severe pain in her arm after covid vaccine, it took a month to get better.      12/16/2021: Janine presents for follow up. Reports ESOB with cold, has ongoing joint pain. R eye pain is intermittent.  Reports headaches after rituximab 1 gram on 10/18/2021.  Last week, her R knee was hurting.      8/20/2021: Janine has pain over arms, knees. Some days, feel stiff all day long. Some days can't do stairs. Hard to tell if there is swelling as has more water retention during summer.  Some days, eye swells up like today. No fevers, SOB, CP or cough.  Has rosacea but some days wakes up with heat rash over sun. Her face is peeling.  Sometimes can't lift things or grab things with pain from elbow to hands. Has shoulder and neck pain but this forearm pain is new.  Stopped HCQ in mid July due to concern for potential HCQ toxicity on OCT, her eye exam with Dr. Vernon has been re-scheduled and upcoming on 9/14 to address HCQ toxicity, pain is not worse off HCQ.  Not COVID vaccinated but is cautious.      5/10/2021: Joint ache, knees hurt, R elbow hurts, R arm hurts, R hand hurts. Has lots of swelling in her joints especially hands.    Depending on weather, joints jhurt, sometimes pain is 7/10.  Has problem with taking stairs and balance.  Gets off balance.   R eye gets tinglin, swelling.  Gets out of breath, gets  worse with seasonal allergies.  Over past month and half, took nitro more, like 8 times.  No hemooptysis, no hematuria.  Has allergies.      4/21/2021: Had last rituximab 1 gram on 1/19, 2/2/2021. Reports rituximab starts to wear off earlier, has more sx this time. Eye swelling is intermittent. Gets indigestion/ GERD sx with constipation after the infusion.  She reports having asthma, swelling of neck, arms. She thinks that it could be from her vasculitis. She is worried about going down on rituximab dose.   Otherwise unchanged sx, no SOB or hemoptysis or persistent cough, or   Somedays her knees and hips hurt a lot, feel like bone on bone in her knees, especially on changing position.      Component      Latest Ref Rng 2/28/2013 2/28/2013          12:14 PM 12:14 PM   WBC      4.0 - 11.0 10e9/L     RBC Count      3.8 - 5.2 10e12/L     Hemoglobin      11.7 - 15.7 g/dL     Hematocrit      35.0 - 47.0 %     MCV      78 - 100 fl     MCH      26.5 - 33.0 pg     MCHC      31.5 - 36.5 g/dL     RDW      10.0 - 15.0 %     Platelet Count      150 - 450 10e9/L     Specimen Description           Lyme Screen IgG and IgM           Vitamin D Deficiency screening      30 - 75 ug/L     Ferritin      10 - 300 ng/mL     Sed Rate      0 - 20 mm/h     ALLI Screen by EIA      <1.0     Rheumatoid Factor      0 - 14 IU/mL     CK Total      30 - 225 U/L  78   Uric Acid      2.5 - 7.5 mg/dL 6.7      Component      Latest Ref Rng 2/28/2013          12:14 PM   WBC      4.0 - 11.0 10e9/L    RBC Count      3.8 - 5.2 10e12/L    Hemoglobin      11.7 - 15.7 g/dL    Hematocrit      35.0 - 47.0 %    MCV      78 - 100 fl    MCH      26.5 - 33.0 pg    MCHC      31.5 - 36.5 g/dL    RDW      10.0 - 15.0 %    Platelet Count      150 - 450 10e9/L    Specimen Description       Serum   Lyme Screen IgG and IgM       Test value: <0.75....Interpretation: Negative....If you highly suspect Lyme . . .   Vitamin D Deficiency screening      30 - 75 ug/L    Ferritin       10 - 300 ng/mL    Sed Rate      0 - 20 mm/h    ALLI Screen by EIA      <1.0    Rheumatoid Factor      0 - 14 IU/mL    CK Total      30 - 225 U/L    Uric Acid      2.5 - 7.5 mg/dL      Component      Latest Ref Rng 2/28/2013 2/28/2013 2/28/2013 2/28/2013          12:14 PM 12:14 PM 12:14 PM 12:14 PM   WBC      4.0 - 11.0 10e9/L       RBC Count      3.8 - 5.2 10e12/L       Hemoglobin      11.7 - 15.7 g/dL       Hematocrit      35.0 - 47.0 %       MCV      78 - 100 fl       MCH      26.5 - 33.0 pg       MCHC      31.5 - 36.5 g/dL       RDW      10.0 - 15.0 %       Platelet Count      150 - 450 10e9/L       Specimen Description             Lyme Screen IgG and IgM             Vitamin D Deficiency screening      30 - 75 ug/L       Ferritin      10 - 300 ng/mL    10   Sed Rate      0 - 20 mm/h   23 (H)    ALLI Screen by EIA      <1.0  <1.0 . . .     Rheumatoid Factor      0 - 14 IU/mL 26 (H)      CK Total      30 - 225 U/L       Uric Acid      2.5 - 7.5 mg/dL         5/2013  CBC WITH PLATELETS DIFFERENTIAL       Component Value Range    WBC 11.3 (*) 4.0 - 11.0 10e9/L    RBC Count 4.56  3.8 - 5.2 10e12/L    Hemoglobin 11.1 (*) 11.7 - 15.7 g/dL    Hematocrit 34.3 (*) 35.0 - 47.0 %    MCV 75 (*) 78 - 100 fl    MCH 24.3 (*) 26.5 - 33.0 pg    MCHC 32.4  31.5 - 36.5 g/dL    RDW 16.1 (*) 10.0 - 15.0 %    Platelet Count 315  150 - 450 10e9/L    Diff Method Automated Method      % Neutrophils 71.6  40 - 75 %    % Lymphocytes 20.9  20 - 48 %    % Monocytes 4.3  0 - 12 %    % Eosinophils 2.8  0 - 6 %    % Basophils 0.2  0 - 2 %    % Immature Granulocytes 0.2  0 - 0.4 %    Absolute Neutrophil 8.1  1.6 - 8.3 10e9/L    Absolute Lymphocytes 2.4  0.8 - 5.3 10e9/L    Absolute Monoctyes 0.5  0.0 - 1.3 10e9/L    Absolute Eosinophils 0.3  0.0 - 0.7 10e9/L    Absolute Basophils 0.0  0.0 - 0.2 10e9/L    Abs Immature Granulocytes 0.0  0 - 0.03 10e9/L   AMYLASE       Component Value Range    Amylase 103  30 - 110 U/L   COMPREHENSIVE METABOLIC  PANEL       Component Value Range    Sodium 144  133 - 144 mmol/L    Potassium 3.8  3.4 - 5.3 mmol/L    Chloride 105  94 - 109 mmol/L    Carbon Dioxide 23  20 - 32 mmol/L    Anion Gap 17  6 - 17 mmol/L    Glucose 173 (*) 60 - 99 mg/dL    Urea Nitrogen 13  5 - 24 mg/dL    Creatinine 0.83  0.52 - 1.04 mg/dL    GFR Estimate 74  >60 mL/min/1.7m2    GFR Estimate If Black 90  >60 mL/min/1.7m2    Calcium 9.7  8.5 - 10.4 mg/dL    Bilirubin Total 0.4  0.2 - 1.3 mg/dL    Albumin 4.3  3.9 - 5.1 g/dL    Protein Total 7.8  6.8 - 8.8 g/dL    Alkaline Phosphatase 66  40 - 150 U/L    ALT 36  0 - 50 U/L    AST 28  0 - 45 U/L   CK TOTAL       Component Value Range    CK Total 66  30 - 225 U/L   CRP INFLAMMATION       Component Value Range    CRP Inflammation 10.4 (*) 0.0 - 8.0 mg/L   LIPASE       Component Value Range    Lipase 353 (*) 20 - 250 U/L   ERYTHROCYTE SEDIMENTATION RATE AUTO       Component Value Range    Sed Rate 26 (*) 0 - 20 mm/h   ROUTINE UA WITH MICROSCOPIC REFLEX TO CULTURE       Component Value Range    Color Urine Yellow      Appearance Urine Slightly Cloudy      Glucose Urine 30 (*) NEG mg/dL    Bilirubin Urine Negative  NEG    Ketones Urine 5 (*) NEG mg/dL    Specific Gravity Urine 1.026  1.003 - 1.035    Blood Urine Trace (*) NEG    pH Urine 5.0  5.0 - 7.0 pH    Protein Albumin Urine 10 (*) NEG mg/dL    Urobilinogen mg/dL Normal  0.0 - 2.0 mg/dL    Nitrite Urine Negative  NEG    Leukocyte Esterase Urine Negative  NEG    Source Midstream Urine      WBC Urine 1  0 - 2 /HPF    RBC Urine 4 (*) 0 - 2 /HPF    Squamous Epithelial /HPF Urine <1  0 - 1 /HPF    Mucous Urine Present (*) NEG /LPF    Hyaline Casts 3 (*) 0 - 2 /LPF    Calcium Oxalate Moderate (*) NEG /HPF   COMPLEMENT C3       Component Value Range    Complement C3 143  76 - 169 mg/dL   COMPLEMENT C4       Component Value Range    Complement C4 31  15 - 50 mg/dL   HEPATITIS C ANTIBODY       Component Value Range    Hepatitis C Antibody Negative  NEG    CARDIOLIPIN ANTIBODY IGG AND IGM       Component Value Range    Cardiolipin IgG Marline    0 - 15.0 GPL    Value: <15.0      Interpretation:  Negative    Cardiolipin IgM Marline    0 - 12.5 MPL    Value: <12.5      Interpretation:  Negative   LUPUS PANEL       Component Value Range    Lupus Result    NEG    Value: Negative      (Note)      COMMENTS:      The INR is normal.      APTT is normal.  1:2 Mix is not indicated.      DRVVT Screen is normal.      Thrombin time is normal.      NEGATIVE TEST; A LUPUS ANTICOAGULANT WAS NOT DETECTED IN THIS      SPECIMEN WITHIN THE LIMITS OF THE TESTING REPERTOIRE.      If the clinical picture is strongly suggestive of an antiphospholipid      syndrome, recommend anticardiolipin and beta-2-glycoprotein (IgG and      IgM) antibody tests.      Leela Franks M.D.  [phone]      5/2/2013            INR =  0.93 N = 0.86-1.14  (No additional charge)      TT = 15.7 N = 13.0-19.0 sec  (No additional charge)            APTT'S:    Seconds      Reagent =  Stago LA      Normal  =  38      Patient  =  34      1:2 Mix  =  N/A      Reference =  31-43             DILUTE MADELINE VIPER VENOM TEST:      DRVVT Screen Ratio = 0.76 Normal = <1.21         IMMUNOGLOBULIN G SUBCLASSES       Component Value Range    IGG 1030  695 - 1620 mg/dL    IgG1 488  300 - 856 mg/dL    IgG2 325  158 - 761 mg/dL    IgG3 47  24 - 192 mg/dL    IgG4 18  11 - 86 mg/dL   SUNNY ANTIBODY PANEL       Component Value Range    Ribonucleic Protein IgG Antibody 0      Smith Antibody IgG 1      SSA (RO) Antibody IgG 4      SSB (LA) Antibody IgG 0      Scleroderma Antibody IgG 0     BETA 2 GLYCOPROTEIN ANTIBODIES IGG IGM       Component Value Range    Beta-2-Glycopro IgG 1      Beta-2-Glycopro IgM 5     CYCLIC CIT PEPT IGG       Component Value Range    Cyclic Cit Pept IgG/IgA    <20 UNITS    Value: <20      Interpretation:  Negative   DNA DOUBLE STRANDED ANTIBODIES       Component Value Range    DNA-ds    0 - 29 IU/mL    Value:  <15      Interpretation:  Negative       CT CHEST PULMONARY EMBOLISM W CONTRAST 5/20/2015 4:57 PM  HISTORY: Pain. SOB. Elevated d-dimer.   TECHNIQUE: 65 mL Isovue 370. Axial images with coronal  reconstructions.  COMPARISON: None.  FINDINGS: Calcified granuloma with surrounding scarring in the  posterolateral left upper lobe. Triangular shaped opacity at the right  lung base in the lateral right middle lobe could represent some  scarring, atelectasis or infiltrate. There is also some scarring or  atelectasis in the posteromedial right lung base. Lungs otherwise  clear.  The pulmonary arteries are well opacified. No CT evidence for acute  pulmonary embolus. No aortic dissection.  Diffuse fatty infiltration of the liver.  IMPRESSION  IMPRESSION:   1. No pulmonary embolus identified.  2. Small focus of atelectasis, infiltrate or scarring in the lateral  right middle lobe.  3. Old granulomatous disease.  4. Otherwise negative.  SILVERIO VAZQUEZ MD    Copath Report      Patient Name: FAVIO MARTINEZ   MR#: [id number]   Specimen #: [id number]   Collected: 3/15/2016   Received: 3/15/2016   Reported: 3/17/2016 13:33   Ordering Phy(s): PARVEEN ENRIQUEZ     ORIGINAL REPORT FOLLOWS ADDENDUM  ADDENDUM     TO ORIGINAL REPORT   Status: Signed Out   Date Ordered:3/18/2016   Date Complete:3/18/2016   Date Reported:3/18/2016 12:06   Signed Out By: Marianne Godfrey MD     INTERPRETATION:   This addendum is done to incorporate the results of fungal (GMS) stains   done on the specimen.  Specimen is negative for fungal organisms.  The   original final diagnosis remains unchanged.     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  Negative for malignancy(Please   see microscopic description)     Electronically signed out  "by:     Marianne Godfrey MD     CLINICAL HISTORY:   right orbital mass     GROSS:   The specimen is received labeled \"right orbital biopsy\" and consists of   red-tan nodule measuring 1.5 x 0.9 x 0.6 cm with one smooth side and   opposite rough side consistent with periosteum.  The specimen is   bisected revealing homogenous pale tan fleshy cut surface.  Touch   preparations are prepared, air dried and fixed, portion of specimen is   submitted in RPMI for possible lymphoma workup [facility],   ([facility], [place], [place] ).  The remainder is entirely submitted.   (Dictated by: Marianne Godfrey MD 3/15/2016 04:45 PM)     MICROSCOPIC:     Specimen consists of portion of lacrimal gland with acute and chronic   inflammation.  Focal area of microabscess formation associated with   small areas of necrosis are also present.  Inflammation is seen   extending to the periorbital adipose tissue forming panniculitis.   Specimen is negative for malignancy.  Samples sent for immunophenotyping   to [facility], ([facility], [place], [place] ) reveals no evidence   of monoclonality or aberrant antigen expression.  A GMS (fungal) stain   is pending and results will be reported as an addendum.     CPT Codes:   A: 63056-EU2, 17545-HXFON, SOH, 32885-BLSMD, 79863-JKJH     TESTING LAB LOCATION:   [address]  [address] [phone]     COLLECTION SITE:   [facility]   [facility]            Component      Latest Ref Rng 4/29/2016   Nucleated RBCs      0 /100 0   Absolute Neutrophil      1.6 - 8.3 10e9/L 8.9 (H)   Absolute Lymphocytes      0.8 - 5.3 10e9/L 3.2   Absolute Monocytes      0.0 - 1.3 10e9/L 0.8   Absolute Eosinophils      0.0 - 0.7 10e9/L 0.2   Absolute Basophils      0.0 - 0.2 10e9/L 0.1   Abs Immature Granulocytes      0 - 0.4 10e9/L 0.1   Absolute Nucleated RBC       0.0   IGG      695 - 1620 mg/dL 836   IgG1      300 - 856 " mg/dL 280 (L)   IgG2      158 - 761 mg/dL 277   IgG3      24 - 192 mg/dL 35   IgG4      11 - 86 mg/dL 16   RNP Antibody IgG      0.0 - 0.9 AI <0.2 . . .   Smith SUNNY Antibody IgG      0.0 - 0.9 AI <0.2 . . .   SSA (Ro) (SUNNY) Antibody, IgG      0.0 - 0.9 AI <0.2 . . .   SSB (La) (SUNNY) Antibody, IgG      0.0 - 0.9 AI <0.2 . . .   Scleroderma Antibody Scl-70 SUNNY IgG      0.0 - 0.9 AI <0.2 . . .   Cholesterol      <200 mg/dL 154   Triglycerides      <150 mg/dL 220 (H)   HDL Cholesterol      >49 mg/dL 42 (L)   LDL Cholesterol Calculated      <100 mg/dL 67   Non HDL Cholesterol      <130 mg/dL 111   M Tuberculosis Result      NEG Negative   M Tuberculosis Antigen Value       0.00   Albumin      3.4 - 5.0 g/dL 4.3   ALT      0 - 50 U/L 30   AST      0 - 45 U/L 10   Complement C3      76 - 169 mg/dL 157   Complement C4      15 - 50 mg/dL 32   CRP Inflammation      0.0 - 8.0 mg/L <2.9   Sed Rate      0 - 20 mm/h 2   DNA-ds      <10 IU/mL 1   Cyclic Citrullinated Peptide Antibody, IgG      <7 U/mL 1   Rheumatoid Factor      <20 IU/mL <20   Proteinase 3 Antibody IgG      0.0 - 0.9 AI <0.2 . . .   Myeloperoxidase Antibody IgG      0.0 - 0.9 AI 2.5 (H)   Vitamin D Deficiency screening      20 - 75 ug/L 24   Hemoglobin A1C      4.3 - 6.0 % 7.9 (H)   ALLI by IFA IgG       1:40 (A) . . .     Component      Latest Ref Rng & Units 1/16/2021   WBC      4.0 - 11.0 10e9/L    RBC Count      3.8 - 5.2 10e12/L    Hemoglobin      11.7 - 15.7 g/dL    Hematocrit      35.0 - 47.0 %    MCV      78 - 100 fl    MCH      26.5 - 33.0 pg    MCHC      31.5 - 36.5 g/dL    RDW      10.0 - 15.0 %    Platelet Count      150 - 450 10e9/L    Diff Method          % Neutrophils      %    % Lymphocytes      %    % Monocytes      %    % Eosinophils      %    % Basophils      %    % Immature Granulocytes      %    Nucleated RBCs      0 /100    Absolute Neutrophil      1.6 - 8.3 10e9/L    Absolute Lymphocytes      0.8 - 5.3 10e9/L    Absolute Monocytes      0.0 -  1.3 10e9/L    Absolute Eosinophils      0.0 - 0.7 10e9/L    Absolute Basophils      0.0 - 0.2 10e9/L    Abs Immature Granulocytes      0 - 0.4 10e9/L    Absolute Nucleated RBC          IGG      610 - 1,616 mg/dL    IGA      84 - 499 mg/dL    IGM      35 - 242 mg/dL    Creatinine      0.52 - 1.04 mg/dL    GFR Estimate      >60 mL/min/1.73:m2    GFR Estimate If Black      >60 mL/min/1.73:m2    COVID-19 Virus PCR to [facility] - Source       Nasopharyngeal   COVID-19 Virus PCR to [facility] - Result       Not Detected   Neutrophil Cytoplasmic Antibody      <1:10 titer    Neutrophil Cytoplasmic Antibody Pattern          CD19 B Cells      6 - 27 %    Absolute CD19      107 - 698 cells/uL    Myeloperoxidase Antibody IgG      0.0 - 0.9 AI    Proteinase 3 Antibody IgG      0.0 - 0.9 AI    Vitamin D Deficiency screening      20 - 75 ug/L    Sed Rate      0 - 30 mm/h    CRP Inflammation      0.0 - 8.0 mg/L    Albumin      3.4 - 5.0 g/dL    ALT      0 - 50 U/L    AST      0 - 45 U/L      Component      Latest Ref Rng & Units 5/7/2021   WBC      4.0 - 11.0 10e9/L 8.9   RBC Count      3.8 - 5.2 10e12/L 4.89   Hemoglobin      11.7 - 15.7 g/dL 12.7   Hematocrit      35.0 - 47.0 % 39.7   MCV      78 - 100 fl 81   MCH      26.5 - 33.0 pg 26.0 (L)   MCHC      31.5 - 36.5 g/dL 32.0   RDW      10.0 - 15.0 % 13.7   Platelet Count      150 - 450 10e9/L 336   Diff Method       Automated Method   % Neutrophils      % 65.3   % Lymphocytes      % 20.7   % Monocytes      % 7.8   % Eosinophils      % 5.3   % Basophils      % 0.7   % Immature Granulocytes      % 0.2   Nucleated RBCs      0 /100 0   Absolute Neutrophil      1.6 - 8.3 10e9/L 5.8   Absolute Lymphocytes      0.8 - 5.3 10e9/L 1.8   Absolute Monocytes      0.0 - 1.3 10e9/L 0.7   Absolute Eosinophils      0.0 - 0.7 10e9/L 0.5   Absolute Basophils      0.0 - 0.2 10e9/L 0.1   Abs Immature Granulocytes      0 - 0.4 10e9/L 0.0   Absolute Nucleated RBC       0.0   IGG      610 - 1,616 mg/dL 649    IGA      84 - 499 mg/dL 199   IGM      35 - 242 mg/dL 36   Creatinine      0.52 - 1.04 mg/dL 0.96   GFR Estimate      >60 mL/min/1.73:m2 66   GFR Estimate If Black      >60 mL/min/1.73:m2 77   COVID-19 Virus PCR to [facility] - Source          COVID-19 Virus PCR to [facility] - Result          Neutrophil Cytoplasmic Antibody      <1:10 titer <1:10   Neutrophil Cytoplasmic Antibody Pattern       The ANCA IFA is <1:10.  No further testing will be performed.   CD19 B Cells      6 - 27 % <1 (L)   Absolute CD19      107 - 698 cells/uL <1 (L)   Myeloperoxidase Antibody IgG      0.0 - 0.9 AI 1.1 (H)   Proteinase 3 Antibody IgG      0.0 - 0.9 AI <0.2   Vitamin D Deficiency screening      20 - 75 ug/L 41   Sed Rate      0 - 30 mm/h 9   CRP Inflammation      0.0 - 8.0 mg/L <2.9   Albumin      3.4 - 5.0 g/dL 4.1   ALT      0 - 50 U/L 28   AST      0 - 45 U/L 18     Lab on 07/08/2022   Component Date Value Ref Range Status     Sodium 07/08/2022 143  133 - 144 mmol/L Final     Potassium 07/08/2022 3.9  3.4 - 5.3 mmol/L Final     Chloride 07/08/2022 108  94 - 109 mmol/L Final     Carbon Dioxide (CO2) 07/08/2022 25  20 - 32 mmol/L Final     Anion Gap 07/08/2022 10  3 - 14 mmol/L Final     Urea Nitrogen 07/08/2022 17  7 - 30 mg/dL Final     Creatinine 07/08/2022 1.01  0.52 - 1.04 mg/dL Final     Calcium 07/08/2022 9.3  8.5 - 10.1 mg/dL Final     Glucose 07/08/2022 103 (A) 70 - 99 mg/dL Final     GFR Estimate 07/08/2022 65  >60 mL/min/1.73m2 Final    Effective December 21, 2021 eGFRcr in adults is calculated using the 2021 CKD-EPI creatinine equation which includes age and gender (Ryan montes al., NEJ, DOI: 10.1056/NTOMgc8517863)     WBC Count 07/08/2022 12.0 (A) 4.0 - 11.0 10e3/uL Final     RBC Count 07/08/2022 4.94  3.80 - 5.20 10e6/uL Final     Hemoglobin 07/08/2022 12.6  11.7 - 15.7 g/dL Final     Hematocrit 07/08/2022 39.6  35.0 - 47.0 % Final     MCV 07/08/2022 80  78 - 100 fL Final     MCH 07/08/2022 25.5 (A) 26.5 - 33.0 pg Final      MCHC 07/08/2022 31.8  31.5 - 36.5 g/dL Final     RDW 07/08/2022 13.6  10.0 - 15.0 % Final     Platelet Count 07/08/2022 350  150 - 450 10e3/uL Final     % Neutrophils 07/08/2022 66  % Final     % Lymphocytes 07/08/2022 22  % Final     % Monocytes 07/08/2022 9  % Final     % Eosinophils 07/08/2022 3  % Final     % Basophils 07/08/2022 0  % Final     % Immature Granulocytes 07/08/2022 0  % Final     NRBCs per 100 WBC 07/08/2022 0  <1 /100 Final     Absolute Neutrophils 07/08/2022 7.9  1.6 - 8.3 10e3/uL Final     Absolute Lymphocytes 07/08/2022 2.7  0.8 - 5.3 10e3/uL Final     Absolute Monocytes 07/08/2022 1.1  0.0 - 1.3 10e3/uL Final     Absolute Eosinophils 07/08/2022 0.3  0.0 - 0.7 10e3/uL Final     Absolute Basophils 07/08/2022 0.1  0.0 - 0.2 10e3/uL Final     Absolute Immature Granulocytes 07/08/2022 0.0  <=0.4 10e3/uL Final     Absolute NRBCs 07/08/2022 0.0  10e3/uL Final     Component      Latest Ref Rng & Units 8/19/2022   WBC      4.0 - 11.0 10e3/uL 12.6 (H)   RBC Count      3.80 - 5.20 10e6/uL 5.06   Hemoglobin      11.7 - 15.7 g/dL 12.8   Hematocrit      35.0 - 47.0 % 40.4   MCV      78 - 100 fL 80   MCH      26.5 - 33.0 pg 25.3 (L)   MCHC      31.5 - 36.5 g/dL 31.7   RDW      10.0 - 15.0 % 14.1   Platelet Count      150 - 450 10e3/uL 387   % Neutrophils      % 71   % Lymphocytes      % 20   % Monocytes      % 6   % Eosinophils      % 3   % Basophils      % 0   % Immature Granulocytes      % 0   NRBCs per 100 WBC      <1 /100 0   Absolute Neutrophils      1.6 - 8.3 10e3/uL 8.8 (H)   Absolute Lymphocytes      0.8 - 5.3 10e3/uL 2.5   Absolute Monocytes      0.0 - 1.3 10e3/uL 0.8   Absolute Eosinophils      0.0 - 0.7 10e3/uL 0.4   Absolute Basophils      0.0 - 0.2 10e3/uL 0.1   Absolute Immature Granulocytes      <=0.4 10e3/uL 0.1   Absolute NRBCs      10e3/uL 0.0   Color Urine      Colorless, Straw, Light Yellow, Yellow Straw   Appearance Urine      Clear Clear   Glucose Urine      Negative mg/dL 1000 (A)    Bilirubin Urine      Negative Negative   Ketones Urine      Negative mg/dL Negative   Specific Gravity Urine      1.003 - 1.035 1.020   Blood Urine      Negative Negative   pH Urine      5.0 - 7.0 5.0   Protein Albumin Urine      Negative mg/dL Negative   Urobilinogen mg/dL      Normal, 2.0 mg/dL Normal   Nitrite Urine      Negative Negative   Leukocyte Esterase Urine      Negative Negative   RBC Urine      <=2 /HPF 1   WBC Urine      <=5 /HPF 5   Squamous Epithelial /HPF Urine      <=1 /HPF <1   MPO Marline IgG Instrument Value      <3.5 U/mL 0.7   Myeloperoxidase Antibody IgG      Negative Negative   Proteinase 3 Marline IgG Instrument Value      <2.0 U/mL <1.0   Proteinase 3 Antibody IgG      Negative Negative   Protein Random Urine      g/L 0.11   Protein Total Urine g/gr Creatinine      0.00 - 0.20 g/g Cr 0.09   Creatinine Urine      mg/dL 128   IGG      610 - 1,616 mg/dL 641   IGA      84 - 499 mg/dL 204   IGM      35 - 242 mg/dL 32 (L)   Creatinine      0.52 - 1.04 mg/dL 1.24 (H)   GFR Estimate      >60 mL/min/1.73m2 51 (L)   CD19 B Cells      6 - 27 % <1 (L)   Absolute CD19      107 - 698 cells/uL <1 (L)   Neutrophil Cytoplasmic Antibody      <1:10 <1:10   Neutrophil Cytoplasmic Antibody Pattern       The ANCA IFA is <1:10.  No further testing will be performed.   AST      0 - 45 U/L 16   ALT      0 - 50 U/L 34   Albumin      3.4 - 5.0 g/dL 4.2   CRP Inflammation      0.0 - 8.0 mg/L 9.1 (H)   Sed Rate      0 - 30 mm/hr 15   Vitamin D Deficiency screening      20 - 75 ug/L 36     Component      Latest Ref Rng & Units 1/19/2023   WBC      4.0 - 11.0 10e3/uL 16.1 (H)   RBC Count      3.80 - 5.20 10e6/uL 5.39 (H)   Hemoglobin      11.7 - 15.7 g/dL 13.4   Hematocrit      35.0 - 47.0 % 41.9   MCV      78 - 100 fL 78   MCH      26.5 - 33.0 pg 24.9 (L)   MCHC      31.5 - 36.5 g/dL 32.0   RDW      10.0 - 15.0 % 15.4 (H)   Platelet Count      150 - 450 10e3/uL 383   % Neutrophils      % 79   % Lymphocytes      % 16   %  Monocytes      % 4   % Eosinophils      % 1   % Basophils      % 0   % Immature Granulocytes      % 0   NRBCs per 100 WBC      <1 /100 0   Absolute Neutrophils      1.6 - 8.3 10e3/uL 12.6 (H)   Absolute Lymphocytes      0.8 - 5.3 10e3/uL 2.6   Absolute Monocytes      0.0 - 1.3 10e3/uL 0.7   Absolute Eosinophils      0.0 - 0.7 10e3/uL 0.2   Absolute Basophils      0.0 - 0.2 10e3/uL 0.1   Absolute Immature Granulocytes      <=0.4 10e3/uL 0.1   Absolute NRBCs      10e3/uL 0.0   Sodium      136 - 145 mmol/L 137   Potassium      3.4 - 5.3 mmol/L 4.0   Chloride      98 - 107 mmol/L 98   Carbon Dioxide (CO2)      22 - 29 mmol/L 25   Anion Gap      7 - 15 mmol/L 14   Urea Nitrogen      6.0 - 20.0 mg/dL 23.6 (H)   Creatinine      0.51 - 0.95 mg/dL 1.09 (H)   Calcium      8.6 - 10.0 mg/dL 10.3 (H)   Glucose      70 - 99 mg/dL 232 (H)   Alkaline Phosphatase      35 - 104 U/L 95   AST      10 - 35 U/L 23   ALT      10 - 35 U/L 26   Protein Total      6.4 - 8.3 g/dL 7.7   Albumin      3.5 - 5.2 g/dL 4.9   Bilirubin Total      <=1.2 mg/dL 0.3   GFR Estimate      >60 mL/min/1.73m2 59 (L)   Color Urine      Colorless, Straw, Light Yellow, Yellow Light Yellow   Appearance Urine      Clear Clear   Glucose Urine      Negative mg/dL >=1000 (A)   Bilirubin Urine      Negative Negative   Ketones Urine      Negative mg/dL 10 (A)   Specific Gravity Urine      1.003 - 1.035 1.033   Blood Urine      Negative Negative   pH Urine      5.0 - 7.0 6.0   Protein Albumin Urine      Negative mg/dL Negative   Urobilinogen mg/dL      Normal, 2.0 mg/dL Normal   Nitrite Urine      Negative Negative   Leukocyte Esterase Urine      Negative Negative   Iron      37 - 145 ug/dL 51   Iron Binding Capacity      240 - 430 ug/dL 362   Iron Sat Index      15 - 46 % 14 (L)   Parathyroid Hormone Intact      15 - 65 pg/mL 51   Calcium Ionized Whole Blood      4.4 - 5.2 mg/dL 4.9   Ferritin      11 - 328 ng/mL 70   TSH      0.30 - 4.20 uIU/mL 0.40   3 views cervical  spine radiographs 2/10/2023 4:10 PM     History: neck pain; Neck pain     Comparison: MRI cervical spine 4/14/2015.     Findings:  AP, lateral, and odontoid views of the cervical spine were obtained.     Down to the level of C7 is well visualized on the lateral view(s). The  cervicothoracic junction is partially visualized.     There is no acute osseous abnormality. No prevertebral soft tissue  swelling. Normal cervical lordosis is maintained.       Moderate loss of intervertebral disc height at C6-7. Mild loss of disc  height at C5-6. Additional multilevel degenerative changes including  endplate osteophytosis and uncinate hypertrophy. Dens is obscured by  the overlying maxilla on the odontoid view.     The visualized lung apices are clear.                                                                      Impression:  Multilevel cervical degenerative changes, greatest at C6-7.     I have personally reviewed the examination and initial interpretation  and I agree with the findings.     TASHI KELLY MD      Component      Latest Ref Rng & Units 2/10/2023   WBC      4.0 - 11.0 10e3/uL 11.9 (H)   RBC Count      3.80 - 5.20 10e6/uL 5.30 (H)   Hemoglobin      11.7 - 15.7 g/dL 13.2   Hematocrit      35.0 - 47.0 % 40.7   MCV      78 - 100 fL 77 (L)   MCH      26.5 - 33.0 pg 24.9 (L)   MCHC      31.5 - 36.5 g/dL 32.4   RDW      10.0 - 15.0 % 15.3 (H)   Platelet Count      150 - 450 10e3/uL 415   % Neutrophils      % 69   % Lymphocytes      % 23   % Monocytes      % 6   % Eosinophils      % 2   % Basophils      % 0   % Immature Granulocytes      % 0   NRBCs per 100 WBC      <1 /100 0   Absolute Neutrophils      1.6 - 8.3 10e3/uL 8.1   Absolute Lymphocytes      0.8 - 5.3 10e3/uL 2.7   Absolute Monocytes      0.0 - 1.3 10e3/uL 0.8   Absolute Eosinophils      0.0 - 0.7 10e3/uL 0.2   Absolute Basophils      0.0 - 0.2 10e3/uL 0.0   Absolute Immature Granulocytes      <=0.4 10e3/uL 0.0   Absolute NRBCs      10e3/uL 0.0    Sodium      136 - 145 mmol/L 138   Potassium      3.4 - 5.3 mmol/L 4.1   Chloride      98 - 107 mmol/L 99   Carbon Dioxide (CO2)      22 - 29 mmol/L 23   Anion Gap      7 - 15 mmol/L 16 (H)   Urea Nitrogen      6.0 - 20.0 mg/dL 24.6 (H)   Creatinine      0.51 - 0.95 mg/dL 1.07 (H)   Calcium      8.6 - 10.0 mg/dL 10.5 (H)   Glucose      70 - 99 mg/dL 205 (H)   Alkaline Phosphatase      35 - 104 U/L 86   AST      10 - 35 U/L 26   ALT      10 - 35 U/L 30   Protein Total      6.4 - 8.3 g/dL 7.6   Albumin      3.5 - 5.2 g/dL 4.8   Bilirubin Total      <=1.2 mg/dL 0.2   GFR Estimate      >60 mL/min/1.73m2 61   MPO Marline IgG Instrument Value      <3.5 U/mL 0.8   Myeloperoxidase Antibody IgG      Negative Negative   Proteinase 3 Marline IgG Instrument Value      <2.0 U/mL <1.0   Proteinase 3 Antibody IgG      Negative Negative   IGG      610 - 1,616 mg/dL 680   IGA      84 - 499 mg/dL 197   IGM      35 - 242 mg/dL 30 (L)   CD19 B Cells      6 - 27 % <1 (L)   Absolute CD19      107 - 698 cells/uL <1 (L)   Neutrophil Cytoplasmic Antibody      <1:10 <1:10   Neutrophil Cytoplasmic Antibody Pattern       The ANCA IFA is <1:10.  No further testing will be performed.   % Retic      0.5 - 2.0 % 1.4   Absolute Retic      0.025 - 0.095 10e6/uL 0.073   CRP Inflammation      <5.00 mg/L 6.67 (H)   Sed Rate      0 - 30 mm/hr 12   Lipase      13 - 60 U/L 26     ROS: A comprehensive ROS was done. Positives are per HPI.      HISTORY REVIEW:  Past Medical History:   Diagnosis Date     Abnormal glandular Papanicolaou smear of cervix 1992     Allergic rhinitis     Allergy, airborne subst     Arthritis      ASCVD (arteriosclerotic cardiovascular disease)      Chronic pain      Chronic pancreatitis (H)     idiopathic, Tx: PPI, antioxidants, pancreatic enzymes     Common migraine      Coronary artery disease      Costochondritis      Difficulty in walking(719.7)      Dyspnea on exertion      Ectasia, mammary duct     [facility],  persistent nipple discharge     Elevated fasting glucose      Gastro-oesophageal reflux disease      Granulomatosis with polyangiitis (H)      Hernia      History of angina      Hyperlipidemia LDL goal < 100      Hypertension goal BP (blood pressure) < 140/90     Essential hypertension     Iron deficiency anemia      Ischemic cardiomyopathy      Menorrhagia      Migraine headaches      Mild persistent asthma      Neuritis optic     subacute autoimmune retrobulbar neuritis, Dr. 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 Matias, neg w/u, Rx'd w/prednisone     Ventral hernia, unspecified, without mention of obstruction or gangrene 2012     Past Surgical History:   Procedure Laterality Date     C/SECTION, LOW TRANSVERSE  1996         CARDIAC SURGERY      cardiac stent- recent in 16; 4 other stents     DILATION AND CURETTAGE N/A 2016    Procedure: DILATION AND CURETTAGE;  Surgeon: Nahed Butler MD;  Location: [facility]     ESOPHAGOSCOPY, GASTROSCOPY, DUODENOSCOPY (EGD), COMBINED N/A 2022    Procedure: ESOPHAGOGASTRODUODENOSCOPY, WITH BIOPSY;  Surgeon: Enzo Sesay MD;  Location:  GI     [facility] UGI ENDOSCOPY W EUS  2008    Dr. Pastrana, possible chronic pancreatitis, fatty liver     HERNIA REPAIR  2012    done at [facility]     INSERT INTRAUTERINE DEVICE N/A 2016    Procedure: INSERT  INTRAUTERINE DEVICE;  Surgeon: Nahed Butler MD;  Location: [facility]     INT UTERINE FIBRIOD EMBOLIZATION  10/29/2014     LAPAROSCOPIC CHOLECYSTECTOMY  2008    Dr. Arnol GRUBBS TX, CERVICAL  1992    s/p HUMERA, done at [facility] in [place].     ORBITOTOMY Right 03/15/2016    Procedure: ORBITOTOMY;  Surgeon: Myron Cyr MD;  Location:  [facility]     ORBITOTOMY Right 2017    Procedure: ORBITOTOMY;  RIGHT ORBITOTOMY AND BIOPSY;  Surgeon: Charis Holbrook MD;  Location: [facility]     REPAIR PTOSIS Right 2017    Procedure: REPAIR PTOSIS;  RIGHT UPPER LID PTOSIS REPAIR;  Surgeon: Myron Cyr MD;  Location: [facility]     UPPER GI ENDOSCOPY  10/21/2008    mild gastritis, Dr. Rocky CALDERA ECHO HEART XTHORACIC,COMPLETE, W/O DOPPLER  2004    [facility] WNL, EF 60%     Family History   Problem Relation Age of Onset     Heart Disease Father 50        heart attack     Cerebrovascular Disease Father      Diabetes Father      Hypertension Father      Depression Father      C.A.D. Father      Hypertension Mother      Arthritis Mother      Heart Disease Mother         long qt syndrome     Thyroid Disease Mother      C.A.D. Mother      Heart Disease Brother 15        MI at 15, 16.      Diabetes Maternal Uncle      Hypertension Maternal Aunt      Hypertension Maternal Uncle      Arthritis Brother         he passed away, had severe arthritis at age 11     Arthritis Maternal Uncle      Eye Disorder Maternal Uncle         cataracts     Neurologic Disorder Sister         migraines     Neurologic Disorder Sister         migraines     Respiratory Son         asthma     Cerebrovascular Disease Maternal Uncle      C.A.D. Brother      Family History Negative Other         neg for RA, SLE     Unknown/Adopted No family hx of         unknown neurological issues in her family, mother was adopted     Skin Cancer No family hx of         No known family hx of skin cancer     Social  History     Socioeconomic History     Marital status: Single     Spouse name: Not on file     Number of children: 1     Years of education: Not on file     Highest education level: Not on file   Occupational History     Employer: NONE    Tobacco Use     Smoking status: Some Days     Packs/day: 0.20     Years: 1.00     Pack years: 0.20     Types: Cigarettes     Last attempt to quit: 2016     Years since quittin.1     Smokeless tobacco: Never   Substance and Sexual Activity     Alcohol use: No     Alcohol/week: 0.0 standard drinks     Drug use: No     Sexual activity: Not Currently   Other Topics Concern     Parent/sibling w/ CABG, MI or angioplasty before 65F 55M? Yes   Social History Narrative    Balanced Diet - sometimes    Osteoporosis Prevention Measures - Dairy servings per day: 2 servings weekly    Regular Exercise -  Yes Describe walking 4 times a week    Dental Exam - NO    Seatbelts used - Yes    Self Breast Exam - Yes    Abuse: Current or Past (Physical, Sexual or Emotional)- No    Do you have any concerns about STD's -  No    Do you feel safe in your environment - No    Guns stored in the home - No    Sunscreen used - Yes    Lipids -  YES - Date:     Glucose -  YES - Date:     Eye Exam - YES - Date: one year ago    Colon Cancer Screening - No    Hemoccults - NO    Pap Test -  YES - Date: , remote history of LEEP    Mammography - YES - Date: last spring, would like to discuss, needs a referral to [facility]    DEXA - NO    Immunizations reviewed and up to date - Yes, last td given in  or      Social Determinants of Health     Financial Resource Strain: Not on file   Food Insecurity: Not on file   Transportation Needs: Not on file   Physical Activity: Not on file   Stress: Not on file   Social Connections: Not on file   Intimate Partner Violence: Not on file   Housing Stability: Not on file     Patient Active Problem List   Diagnosis     Seasonal affective  disorder (H)     Allergic rhinitis     PCOS (polycystic ovarian syndrome)     Moderate persistent asthma     Chronic pancreatitis (H)     Hypertension goal BP (blood pressure) < 140/90     Common migraine     NSTEMI (non-ST elevated myocardial infarction) (H)     Hyperlipidemia LDL goal <70     ASCVD (arteriosclerotic cardiovascular disease)     GERD (gastroesophageal reflux disease)     Ischemic cardiomyopathy     Hypertensive heart disease     Uterine leiomyoma     Iron deficiency anemia     Costochondritis     Vitamin D deficiency     Breast cancer screening     Spinal stenosis in cervical region     Fibromyalgia     Seronegative rheumatoid arthritis (H)     Type 2 diabetes, HbA1C goal < 8% (H)     Type 2 diabetes mellitus with other specified complication (H)     Hyperlipidemia associated with type 2 diabetes mellitus (H)     Hypertension associated with diabetes (H)     Overweight with body mass index (BMI) 25.0-29.9     Tobacco use disorder     Telogen effluvium     CAD S/P percutaneous coronary angioplasty     Status post coronary angiogram     ANCA-associated vasculitis     Wegener's vasculitis     Type 1 diabetes mellitus with complications (H)     Chest discomfort     Urinary frequency     Abdominal pain, epigastric     Hypokalemia     Leukocytosis, unspecified type     Acute pancreatitis, unspecified complication status, unspecified pancreatitis type       Pregnancy Hx: She is . All miscarriages were in first trimester. H/o OC use in the past. 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    PMHx, FHx, SHx were reviewed, unchanged.    Outpatient Encounter Medications as of 3/9/2023   Medication Sig Dispense Refill     acetaminophen (TYLENOL) 325 MG tablet Take 1-2 tablets (325-650 mg) by mouth every 6 hours as needed for pain (headache) 250 tablet 4     ADVAIR DISKUS 250-50 MCG/ACT inhaler        albuterol (2.5 MG/3ML) 0.083% neb solution INL 1 VIAL VIA NEBULIZATION Q 4 TO 6 HOURS PRN  1      albuterol (PROAIR HFA, PROVENTIL HFA, VENTOLIN HFA) 108 (90 BASE) MCG/ACT inhaler Inhale 2 puffs into the lungs every 6 hours as needed for shortness of breath / dyspnea or wheezing 3 Inhaler 1     BANOPHEN 25 MG tablet Take 25 mg by mouth At Bedtime       blood glucose (NO BRAND SPECIFIED) lancets standard Use to test blood sugar 1-4 times daily or as directed. 400 each 3     blood glucose (NO BRAND SPECIFIED) test strip Use to test blood sugar 1-4 times daily or as directed. 400 strip 3     Blood Pressure Monitor KIT 1 each daily Monitor home blood pressure as instructed by physician.  Dispense [place] blood pressure kit. 1 kit 0     calcium carbonate (TUMS) 500 MG chewable tablet Take 3-4 tablets (1,500-2,000 mg) by mouth daily as needed 90 tablet 3     clopidogrel (PLAVIX) 75 MG tablet Take 1 tablet (75 mg) by mouth daily . 30-day refills only. 30 tablet 11     COMPRESSION STOCKINGS 2 each daily Apply one 10-15 mmHg compression stocking to each lower extgmierty during the day and remove before bedtime. 4 each 2     cyclobenzaprine (FLEXERIL) 10 MG tablet Take 1 tablet (10 mg) by mouth 2 times daily as needed for muscle spasms 60 tablet 3     dicyclomine (BENTYL) 20 MG tablet TAKE 1 TABLET(20 MG) BY MOUTH FOUR TIMES DAILY AS NEEDED. 60 tablet 0     diphenhydrAMINE (BENADRYL) 25 MG capsule Take 1 capsule (25 mg) by mouth nightly as needed for itching or allergies 30 capsule 2     empagliflozin (JARDIANCE) 10 MG TABS tablet Take 1 tablet (10 mg) by mouth daily 90 tablet 3     EPIPEN 2-RIKY 0.3 MG/0.3ML injection INJECT 0.3 MG INTO THE MUSCLE PRF ANAPHYALAXIS  0     Ergocalciferol (VITAMIN D2) 50 MCG (2000 UT) TABS Take 2,000 Units by mouth daily 90 tablet 1     esomeprazole (NEXIUM) 40 MG DR capsule Take 1 capsule (40 mg) by mouth 2 times daily Take 30-60 minutes before eating. 180 capsule 0     famotidine (PEPCID) 20 MG tablet Take 1 tablet (20 mg) by mouth 2 times daily as needed (abdominal pain) 20 tablet 0      fexofenadine (ALLEGRA) 180 MG tablet Take 1 tablet by mouth daily as needed. 90 tablet 3     fluconazole (DIFLUCAN) 200 MG tablet Take 1 tablet (200 mg) by mouth daily 7 tablet 0     fluticasone (FLONASE) 50 MCG/ACT nasal spray SHAKE LIQUID AND USE 1 TO 2 SPRAYS IN EACH NOSTRIL EVERY DAY       folic acid (FOLVITE) 1 MG tablet Take 1 tablet (1 mg) by mouth daily 90 tablet 3     hydrocortisone (CORTAID) 1 % external cream Apply topically 2 times daily 60 g 1     insulin glargine (LANTUS PEN) 100 UNIT/ML pen Inject 75 Units Subcutaneous every morning Or as directed. 75 mL 3     insulin pen needle (BD MARCK U/F) 32G X 4 MM miscellaneous USE DAILY OR AS DIRECTED 300 each 3     ketoconazole (NIZORAL) 2 % shampoo Apply topically daily as needed       levocetirizine (XYZAL) 5 MG tablet Take 5 mg by mouth daily       levofloxacin (LEVAQUIN) 500 MG tablet Take 1 tablet (500 mg) by mouth daily 7 tablet 0     levonorgestrel (MIRENA) 20 MCG/DAY IUD 1 each by Intrauterine route once       lisinopril-hydrochlorothiazide (ZESTORETIC) 20-25 MG tablet Take 1 tablet by mouth daily . 30-day refills only. 30 tablet 11     magnesium 250 MG tablet TAKE 1 TABLET(250 MG) BY MOUTH TWICE DAILY 60 tablet 3     metFORMIN (GLUCOPHAGE-XR) 500 MG 24 hr tablet Take 4 tablets (2,000 mg) by mouth daily (with dinner) 120 tablet 11     metoprolol succinate ER (TOPROL XL) 100 MG 24 hr tablet Take 1 tablet (100 mg) by mouth daily . 30-day refills only. 30 tablet 11     Multiple Vitamin (TAB-A-GERALD) TABS Take 1 tablet by mouth daily 90 tablet 1     nitroGLYcerin (NITROSTAT) 0.4 MG sublingual tablet Place 1 tablet (0.4 mg) under the tongue every 5 minutes as needed for chest pain . After 3 doses, if pain persists call 911. 25 tablet 3     olopatadine (PATANOL) 0.1 % ophthalmic solution        pravastatin (PRAVACHOL) 40 MG tablet Take 1 tablet (40 mg) by mouth daily . 30-day refills only. 30 tablet 11     prochlorperazine (COMPAZINE) 5 MG tablet Take 1  tablet (5 mg) by mouth every 6 hours as needed for nausea or vomiting 60 tablet 3     sennosides (SENOKOT) 8.6 MG tablet 1-2 tabs a day as needed for constipation 60 tablet 1     spironolactone (ALDACTONE) 25 MG tablet Take 1 tablet (25 mg) by mouth daily . Take one additional 0.5 tab daily as needed for weight gain. 45 day refills. 45 tablet 11     sucralfate (CARAFATE) 1 GM tablet Take 1 tablet (1 g) by mouth 4 times daily 120 tablet 3     terbinafine (LAMISIL) 1 % external cream Apply topically 2 times daily 42 g 1     terconazole (TERAZOL 3) 80 MG vaginal suppository Place 1 suppository (80 mg) vaginally At Bedtime 3 suppository 1     traMADol (ULTRAM) 50 MG tablet TAKE 1 TABLET(50 MG) BY MOUTH EVERY 8 HOURS AS NEEDED FOR SEVERE PAIN 60 tablet 3     vitamin D2 (ERGOCALCIFEROL) 67998 units (1250 mcg) capsule Take 1 capsule (50,000 Units) by mouth every 7 days 4 capsule 0     [DISCONTINUED] azelastine (ASTELIN) 0.1 % nasal spray USE 1 TO 2 SPRAYS IN EACH NOSTRIL TWICE DAILY AS NEEDED (Patient not taking: Reported on 1/19/2023)       [DISCONTINUED] estradiol (VAGIFEM) 10 MCG TABS vaginal tablet Place 1 tablet (10 mcg) vaginally twice a week (Patient not taking: Reported on 2/10/2023) 8 tablet 11     [DISCONTINUED] vitamin B-2 (RIBOFLAVIN) 100 MG TABS tablet Take 2 tablets (200 mg) by mouth daily (Patient not taking: Reported on 1/19/2023) 30 tablet 11     No facility-administered encounter medications on file as of 3/9/2023.       Allergies   Allergen Reactions     Amoxicillin-Pot Clavulanate      Artificial Sweetner (Informational Only)  Other (See Comments)     Increased headache     Augmentin      Unknown possible hives and edema     Azithromycin      Diatrizoate Other (See Comments)     Pt wants this listed because she is allergic to shellfish      Imitrex [Sumatriptan]      Severe face/neck/chest tightness and flushing side effects      Penicillins Hives     Unknown      Pork Allergy      Stomach pain,  cramping, diarrhea, itching, nausea and headaches     Shellfish Allergy Hives and Swelling     Sulfa Drugs Hives and Swelling     Zithromax [Azithromycin Dihydrate] Swelling     Unknown        Ph.E:    /80   Pulse 76   Temp 98.8  F (37.1  C)   Wt 77 kg (169 lb 12.8 oz)   SpO2 96%   BMI 31.06 kg/m      GA: NAD, cooperative, son present  HEENT: nl conj, sclera, EOMI. No campos-orbital edema  Chest: CTAB  CV: no M/R/G, RRR  Abdomen: soft, NT  MSK: + active synovitis and joint tenderness over B/L 2nd/3rd MCPs  Skin: no rash  Neuro: non focal  Psych: nl affect    Assessment/Plan:    1-Seropositive non-erosive RA (arthritis, AM stiffness, high CRP, RF 26 but re-check neg 3/2015 on HCQ) Dx 5/2013, FMS, new pleuritic CP 3/20-15-5/2015 resolved on steroids. She is on  mg bid since 5/2014. She tolerates it well and it's helping some but not enough to control all her pain. Continued having flare ups of joint pain, could be GPA related. Some pain is due to FMS.My impression is that her arthralgias are a combination of RA/ IA sec GPA, fibromyalgia and chronic pain.     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 Prednisone was not a good option for her given weight gain, diabetes. She tried and did not tolerate MTX, AZA.    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 Repeat MPO was positive in 6/2017.    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 Shah and Dr. Valdez at [place]. 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     She is on rituximab since 12/12/2018 with great response, minor allergic reaction which could be managed by pre-meds and extra steroid dose. Developed high BG and vaginal yeast infections after solumedrol premed. Treated candida with fluconazole. Will decrease solumerdol dose to IV 80 mg  prior to receiving rituximab. 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 However, feels Rituximab effects wear off around 4 months. According to ACR guidelines can give every 4-6 months. Pt elected to received this upcoming September which will be approximately 5 months from last dose. Repeat Orbit CT in September 2021 demonstrated improvement.     Last visit in April 2022-- Recommended boeld given b cell depletion tx as rituximab blocks the response to covid vaccine, she is concerned about side effects. I advised her to discuss with [facility] pharmacist.      1/25/2023: Janine has been ill since Dx of COVID on 12/17/2022. Her most recent labs were reviewed, stable vasculitis labs in 8/2022 with CD19<1, neg vasculitis markers. In 1/2023, no anemia and nl thyroid function test. I ordered vit D as add on. 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 She does need to follow up on abnormal thyroid US and mammogram as well. Our plan for ANCA vasculitis was to give rituximab 1 gram p1demumu as benefit did not last 6 months with last rituximab on 9/7/2022, but today we both agreed to give next dose in March after 6 months to let her body heal from COVID. I will see her in person, in early march to determine if it is ok to give another dose of rituxiamb. 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 Also ordered shower chairroseanna per her request given significant fatigue, body pain.    Today 3/9/2023: S/p last rituximab 1 gram on 9/7/2022. 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 Stable labs and eye inflammation.    2-Fat pads. She was seen by endocrinology and cushing was ruled out in 4/2014. Was advised  to do f/u for enlarged thyroid/thyroid nodules.  3-Hair loss. Continue with dermatology  4-Chronic pain. F/U with pain clinic  5-Vit D deficiency. Was replaced with vit D 50, 000 units po qwk x 12 wk then 2000 units every day  6-?HCQ toxicity on OCT 7/2021, now off HCQ, could explain increased arthralgia  7- Chronic Headaches. Symptoms worsen after taking zofran. Has received compazine in hospital in the past without adverse effect. Will have patient trial Compazine       Today's plan:    Spine referral for abnormal C spine (DJD) in 2/2023.    Rituximab 1 gram one dose only this month    Labs in May 2023    Follow up eye exam 8/2023    Return in person in about 5-6 months    Orders Placed This Encounter   Procedures     AST     ALT     Myeloperoxidase and Proteinase 3 Panel     Albumin level     Creatinine     CRP inflammation     UA with Microscopic reflex to Culture     Protein  random urine     Creatinine random urine     Erythrocyte sedimentation rate auto     CD19 B Cell Count     ANCA IgG by IFA with Reflex to Titer     Immunoglobulins A G and M     Spine  Referral     CBC with Platelets & Differential       Majo Mckinnon MD

## 2023-03-09 NOTE — TELEPHONE ENCOUNTER
Rituximab therapy plan entered per direction of Dr. Mckinnon, Rituximab 1 gram  IV this month.    Krissy Taylor, BSN, RN  RN Care Coordinator Rheumatology

## 2023-03-09 NOTE — NURSING NOTE
Chief Complaint   Patient presents with     RECHECK     Return visit.     Blood pressure 118/80, pulse 76, temperature 98.8  F (37.1  C), weight 77 kg (169 lb 12.8 oz), SpO2 96 %, not currently breastfeeding.    MARIN JONES

## 2023-03-10 ENCOUNTER — TELEPHONE (OUTPATIENT)
Dept: RHEUMATOLOGY | Facility: CLINIC | Age: 58
End: 2023-03-10
Payer: COMMERCIAL

## 2023-03-10 RX ORDER — MEPERIDINE HYDROCHLORIDE 25 MG/ML
25 INJECTION INTRAMUSCULAR; INTRAVENOUS; SUBCUTANEOUS EVERY 30 MIN PRN
Status: CANCELLED | OUTPATIENT
Start: 2023-03-10

## 2023-03-10 RX ORDER — ALBUTEROL SULFATE 90 UG/1
1-2 AEROSOL, METERED RESPIRATORY (INHALATION)
Status: CANCELLED
Start: 2023-03-10

## 2023-03-10 RX ORDER — DIPHENHYDRAMINE HYDROCHLORIDE 50 MG/ML
50 INJECTION INTRAMUSCULAR; INTRAVENOUS
Status: CANCELLED
Start: 2023-03-10

## 2023-03-10 RX ORDER — EPINEPHRINE 1 MG/ML
0.3 INJECTION, SOLUTION, CONCENTRATE INTRAVENOUS EVERY 5 MIN PRN
Status: CANCELLED | OUTPATIENT
Start: 2023-03-10

## 2023-03-10 RX ORDER — METHYLPREDNISOLONE SODIUM SUCCINATE 125 MG/2ML
81.25 INJECTION, POWDER, LYOPHILIZED, FOR SOLUTION INTRAMUSCULAR; INTRAVENOUS ONCE
Status: CANCELLED | OUTPATIENT
Start: 2023-03-10

## 2023-03-10 RX ORDER — METHYLPREDNISOLONE SODIUM SUCCINATE 125 MG/2ML
125 INJECTION, POWDER, LYOPHILIZED, FOR SOLUTION INTRAMUSCULAR; INTRAVENOUS
Status: CANCELLED
Start: 2023-03-10

## 2023-03-10 RX ORDER — ACETAMINOPHEN 325 MG/1
650 TABLET ORAL ONCE
Status: CANCELLED
Start: 2023-03-10

## 2023-03-10 RX ORDER — ALBUTEROL SULFATE 0.83 MG/ML
2.5 SOLUTION RESPIRATORY (INHALATION)
Status: CANCELLED | OUTPATIENT
Start: 2023-03-10

## 2023-03-10 RX ORDER — HEPARIN SODIUM (PORCINE) LOCK FLUSH IV SOLN 100 UNIT/ML 100 UNIT/ML
5 SOLUTION INTRAVENOUS
Status: CANCELLED | OUTPATIENT
Start: 2023-03-10

## 2023-03-10 RX ORDER — HEPARIN SODIUM,PORCINE 10 UNIT/ML
5 VIAL (ML) INTRAVENOUS
Status: CANCELLED | OUTPATIENT
Start: 2023-03-10

## 2023-03-10 NOTE — TELEPHONE ENCOUNTER
Message left for patient to call this RN back.  [employer] is secured and okay to proceed with scheduling.   Message also sent to scheduling team to reach out to patient to schedule.    AUGUSTINE Phillips, RN  RN Care Coordinator Rheumatology

## 2023-03-12 PROCEDURE — 87177 OVA AND PARASITES SMEARS: CPT | Mod: 90 | Performed by: PATHOLOGY

## 2023-03-12 PROCEDURE — 99000 SPECIMEN HANDLING OFFICE-LAB: CPT | Performed by: PATHOLOGY

## 2023-03-12 PROCEDURE — 87328 CRYPTOSPORIDIUM AG IA: CPT | Mod: 90 | Performed by: PATHOLOGY

## 2023-03-12 PROCEDURE — 87329 GIARDIA AG IA: CPT | Mod: 90 | Performed by: PATHOLOGY

## 2023-03-12 PROCEDURE — 87493 C DIFF AMPLIFIED PROBE: CPT | Mod: 90 | Performed by: PATHOLOGY

## 2023-03-12 PROCEDURE — 87209 SMEAR COMPLEX STAIN: CPT | Mod: 90 | Performed by: PATHOLOGY

## 2023-03-13 LAB — C DIFF TOX B STL QL: NEGATIVE

## 2023-03-14 ENCOUNTER — TELEPHONE (OUTPATIENT)
Dept: RHEUMATOLOGY | Facility: CLINIC | Age: 58
End: 2023-03-14
Payer: COMMERCIAL

## 2023-03-14 LAB
C PARVUM AG STL QL IA: NEGATIVE
G LAMBLIA AG STL QL IA: NEGATIVE
O+P STL MICRO: NEGATIVE

## 2023-03-14 NOTE — TELEPHONE ENCOUNTER
Fax received from [employer] stating that medical records supporting the need for commode and shower chair are needed.     Orders for both the shower chair and commode as well as medical records have been successfully faxed to [phone].    Beulah Fortune CMA   3/14/2023 9:35 AM

## 2023-03-16 ENCOUNTER — OFFICE VISIT (OUTPATIENT)
Dept: OBGYN | Facility: CLINIC | Age: 58
End: 2023-03-16
Attending: OBSTETRICS & GYNECOLOGY
Payer: COMMERCIAL

## 2023-03-16 VITALS
DIASTOLIC BLOOD PRESSURE: 84 MMHG | HEART RATE: 89 BPM | SYSTOLIC BLOOD PRESSURE: 131 MMHG | WEIGHT: 170 LBS | BODY MASS INDEX: 31.09 KG/M2

## 2023-03-16 DIAGNOSIS — D25.1 INTRAMURAL LEIOMYOMA OF UTERUS: ICD-10-CM

## 2023-03-16 DIAGNOSIS — R93.5 ABNORMAL CT SCAN, PELVIS: ICD-10-CM

## 2023-03-16 DIAGNOSIS — Z30.431 IUD CHECK UP: Primary | ICD-10-CM

## 2023-03-16 DIAGNOSIS — N95.2 ATROPHIC VAGINITIS: ICD-10-CM

## 2023-03-16 PROCEDURE — 99214 OFFICE O/P EST MOD 30 MIN: CPT | Performed by: OBSTETRICS & GYNECOLOGY

## 2023-03-16 PROCEDURE — G0463 HOSPITAL OUTPT CLINIC VISIT: HCPCS | Performed by: OBSTETRICS & GYNECOLOGY

## 2023-03-16 RX ORDER — ESTRADIOL 10 UG/1
10 INSERT VAGINAL
Qty: 24 TABLET | Refills: 3 | Status: SHIPPED | OUTPATIENT
Start: 2023-03-16 | End: 2024-03-29

## 2023-03-16 RX ORDER — ACETAMINOPHEN 325 MG/1
975 TABLET ORAL ONCE
Status: CANCELLED | OUTPATIENT
Start: 2023-03-16 | End: 2023-03-16

## 2023-03-16 RX ORDER — DIAPER,BRIEF,INFANT-TODD,DISP
EACH MISCELLANEOUS
COMMUNITY
Start: 2023-02-16 | End: 2023-03-16

## 2023-03-16 ASSESSMENT — ANXIETY QUESTIONNAIRES
GAD7 TOTAL SCORE: 0
3. WORRYING TOO MUCH ABOUT DIFFERENT THINGS: NOT AT ALL
5. BEING SO RESTLESS THAT IT IS HARD TO SIT STILL: NOT AT ALL
1. FEELING NERVOUS, ANXIOUS, OR ON EDGE: NOT AT ALL
GAD7 TOTAL SCORE: 0
7. FEELING AFRAID AS IF SOMETHING AWFUL MIGHT HAPPEN: NOT AT ALL
6. BECOMING EASILY ANNOYED OR IRRITABLE: NOT AT ALL
2. NOT BEING ABLE TO STOP OR CONTROL WORRYING: NOT AT ALL

## 2023-03-16 ASSESSMENT — PATIENT HEALTH QUESTIONNAIRE - PHQ9
5. POOR APPETITE OR OVEREATING: NOT AT ALL
SUM OF ALL RESPONSES TO PHQ QUESTIONS 1-9: 0

## 2023-03-16 NOTE — PROGRESS NOTES
Janine returns for follow-up of atrophic vaginitis and follow-up of IUD finding on recent CT scan.  Patient was seen in December and had an exam with finding of atrophic vaginitis.  She used vagifem for a month and has had a significant improvement of vaginal discharge.  She was not aware that there were refills available, but she would like to restart.    Patient has a long history of upper abdominal pain and for this had a CT abdomen and pelvis on 2/10/23.  Notable findings include fibroid uterus with IUD at the fundus, but questionable embedment of the IUD.  This IUD was placed in the OR under ultrasound guidance in July of 2016.  She has not had any regular bleeding since but does have random spotting.  She may be menopausal given hot flashes and night sweats; however, she also associates this with low blood sugar readings.  Patient is anxious about trial of IUD removal in the office.  She has not tolerated pelvic exams in past and concerned it may be difficult to remove.    No other GYN concerns.  Had a recent normal pap smear in December, 2022.    Past Medical History:   Diagnosis Date     Abnormal glandular Papanicolaou smear of cervix 1992     Allergic rhinitis     Allergy, airborne subst     Arthritis      ASCVD (arteriosclerotic cardiovascular disease)      Chronic pain      Chronic pancreatitis (H)     idiopathic, Tx: PPI, antioxidants, pancreatic enzymes     Common migraine      Coronary artery disease      Costochondritis      Difficulty in walking(719.7)      Dyspnea on exertion      Ectasia, mammary duct     [facility], [facility] n[facility] discharge     Elevated fasting glucose      Gastro-oesophageal reflux disease      Granulomatosis with polyangiitis (H)      Hernia      History of angina      Hyperlipidemia LDL goal < 100      Hypertension goal BP (blood pressure) < 140/90     Essential hypertension     Iron deficiency anemia      Ischemic cardiomyopathy      Menorrhagia      Migraine  headaches      Mild persistent asthma      Neuritis optic     subacute autoimmune retrobulbar neuritis, Dr. 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 Matias, neg w/u, Rx'd w/prednisone     Ventral hernia, unspecified, without mention of obstruction or gangrene 2012     Past Surgical History:   Procedure Laterality Date     C/SECTION, LOW TRANSVERSE  1996         CARDIAC SURGERY      cardiac stent- recent in 16; 4 other stents     DILATION AND CURETTAGE N/A 2016    Procedure: DILATION AND CURETTAGE;  Surgeon: Nahed Butler MD;  [facility]     ESOPHAGOSCOPY, GASTROSCOPY, DUODENOSCOPY (EGD), COMBINED N/A 2022    Procedure: ESOPHAGOGASTRODUODENOSCOPY, WITH BIOPSY;  Surgeon: Enzo Sesay MD;  Location:  GI      UGI ENDOSCOPY W EUS  2008    Dr. Pastrana, possible chronic pancreatitis, fatty liver     [place] [facility] [place] INTRAUTERINE DEVICE N/A 2016    Procedure: INSERT INTRAUTERINE DEVICE;  Surgeon: Nahed Butler MD;  [facility]     INT UTERINE FIBRIOD EMBOLIZATION  10/29/2014     LAPAROSCOPIC CHOLECYSTECTOMY  2008    Dr. Arnol GRUBBS TX, CERVICAL  1992    s/p HUMERA, done at [facility] in [place].     ORBITOTOMY Right 03/15/2016    Procedure: ORBITOTOMY;  Surgeon: Myron Cyr MD;  Location: [facility]      ORBITOTOMY Right 2017    Procedure: ORBITOTOMY;  RIGHT ORBITOTOMY AND BIOPSY;  Surgeon: Charis Holbrook MD;  [facility]     REPAIR PTOSIS Right 2017    Procedure: REPAIR PTOSIS;  RIGHT UPPER LID PTOSIS REPAIR;  Surgeon: Myron Cyr MD;  Location: [facility]     UPPER GI ENDOSCOPY  10/21/2008    mild gastritis, Dr. Rocky CALDERA ECHO HEART XTHORACIC,COMPLETE, W/O DOPPLER  2004    [facility] WNL, EF 60%     Family History   Problem Relation Age of Onset     Heart Disease Father 50        heart attack     Cerebrovascular Disease Father      Diabetes Father      Hypertension Father      Depression Father      C.A.D. Father      Hypertension Mother      Arthritis Mother      Heart Disease Mother         long qt syndrome     Thyroid Disease Mother      C.A.D. Mother      Heart Disease Brother 15        MI at 15, 16.      Diabetes Maternal Uncle      Hypertension Maternal Aunt      Hypertension Maternal Uncle      Arthritis Brother         he passed away, had severe arthritis at age 11     Arthritis Maternal Uncle      Eye Disorder Maternal Uncle         cataracts     Neurologic Disorder Sister         migraines     Neurologic Disorder Sister         migraines     Respiratory Son         asthma     Cerebrovascular Disease Maternal Uncle      C.A.D. Brother      Family History Negative Other         neg for RA, SLE     Unknown/Adopted No family hx of         unknown neurological issues in her family, mother was adopted     Skin Cancer No family hx of         No known family hx of skin cancer     Physical exam:  /84   Pulse 89   Wt 77.1 kg (170 lb)   BMI 31.09 kg/m    Gen'l: appears in no acute distress, complains of pain in upper abdomen, worse on left side.  Patient declines pelvic exam, review of exam findings in December noted that IUD strings visible.    CT scan 2/10/23:   PELVIC ORGANS: Calcified uterine fibroids. IUD in fundal position,  oriented somewhat  vertically, indeterminate whether the tip is within  the endometrium or myometrium (series 4 image 59), especially on  noncontrast imaging. No pelvic masses or free fluid.   IMPRESSION:   1. Normal nonenhanced CT appearance of the pancreas. No acute  abnormality in the abdomen or pelvis.  2. Fibroid uterus with an IUD place. Questioning myometrial embedment  of the IUD, not well evaluated on noncontrast CT2. Recommend pelvic  ultrasound for further evaluation.    Assessment/Plan:  58 yo with h/o AUB that has been well controlled with Mirena IUD  Mirena IUD in place for 7 years, vaginal spotting  Fibroid uterus- known for many years seen on MRI in 2016  Atrophic vaginitis, responded to treatment with vagifem    - Offered trial of IUD removal in clinic, patient declined.  Concern for possible embedded IUD so reasonable to do in OR with hysteroscopy available if needed.  Patient prefers this approach.  - Counseled likely menopausal given no bleeding now 7 years after IUD placement. Given we will be in OR and patient having irregular spotting, likely related to IUD, but perhaps post-menopausal bleeding would plan EMB at that time as well.  - reviewed safety and benefit of vaginal estrogen and improvement in vaginal discharge symptoms.  Recommend she continue and new prescription sent.    Nahed Butler MD     On the day of this encounter 30 minutes of time was spent in consultation with face-to-face discussion, review of historical information, examination, documentation and post visit activities including communication with other providers and coordination of patient care.    Nahed Butler MD

## 2023-03-16 NOTE — LETTER
3/16/2023       RE: Janine Cornell  [address]  [place] [address]     Dear Colleague,    Thank you for referring your patient, Janine Cornell, to the [facility] [place] at [facility]. Please see a copy of my visit note below.    Janine returns for follow-up of atrophic vaginitis and follow-up of IUD finding on recent CT scan.  Patient was seen in December and had an exam with finding of atrophic vaginitis.  She used vagifem for a month and has had a significant improvement of vaginal discharge.  She was not aware that there were refills available, but she would like to restart.    Patient has a long history of upper abdominal pain and for this had a CT abdomen and pelvis on 2/10/23.  Notable findings include fibroid uterus with IUD at the fundus, but questionable embedment of the IUD.  This IUD was placed in the OR under ultrasound guidance in July of 2016.  She has not had any regular bleeding since but does have random spotting.  She may be menopausal given hot flashes and night sweats; however, she also associates this with low blood sugar readings.  Patient is anxious about trial of IUD removal in the office.  She has not tolerated pelvic exams in past and concerned it may be difficult to remove.    No other GYN concerns.  Had a recent normal pap smear in December, 2022.    Past Medical History:   Diagnosis Date     Abnormal glandular Papanicolaou smear of cervix 1992     Allergic rhinitis     Allergy, airborne subst     Arthritis      ASCVD (arteriosclerotic cardiovascular disease)      Chronic pain      Chronic pancreatitis (H)     idiopathic, Tx: PPI, antioxidants, pancreatic enzymes     Common migraine      Coronary artery disease      Costochondritis      Difficulty in walking(719.7)      Dyspnea on exertion      Ectasia, mammary duct     [facility], persistent nipple discharge     Elevated fasting glucose       Gastro-oesophageal reflux disease      Granulomatosis with polyangiitis (H)      Hernia      History of angina      Hyperlipidemia LDL goal < 100      Hypertension goal BP (blood pressure) < 140/90     Essential hypertension     Iron deficiency anemia      Ischemic cardiomyopathy      Menorrhagia      Migraine headaches      Mild persistent asthma      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 Matias, neg w/u, Rx'd w/prednisone     Ventral hernia, unspecified, without mention of obstruction or gangrene 2012     Past Surgical History:   Procedure Laterality Date     C/SECTION, LOW TRANSVERSE  1996         CARDIAC SURGERY      cardiac stent- recent in 16; 4 other stents     DILATION AND CURETTAGE N/A 2016    Procedure: DILATION AND CURETTAGE;  Surgeon: Nahed Butler MD;  [facility]     ESOPHAGOSCOPY, GASTROSCOPY, DUODENOSCOPY (EGD), COMBINED N/A 2022    Procedure: ESOPHAGOGASTRODUODENOSCOPY, WITH BIOPSY;  Surgeon: Enzo Sesay MD;  Location:  GI      UGI ENDOSCOPY W EUS  2008    Dr. Pastrana, possible chronic pancreatitis, fatty liver     [place] [facility] [place] INTRAUTERINE DEVICE N/A 2016    Procedure: INSERT INTRAUTERINE DEVICE;  Surgeon: Nahed Butler MD;  [facility]     INT UTERINE FIBRIOD EMBOLIZATION  10/29/2014     LAPAROSCOPIC CHOLECYSTECTOMY  2008    Dr. Arnol GRUBBS TX, CERVICAL  1992    s/p HUMERA, done at [facility] in [place].     ORBITOTOMY Right 03/15/2016    Procedure: ORBITOTOMY;  Surgeon: Myron Cyr MD;  Location: [facility]     ORBITOTOMY Right 2017    Procedure: ORBITOTOMY;  RIGHT ORBITOTOMY AND BIOPSY;  Surgeon: Charis Holbrook MD;  Location: [facility]     REPAIR PTOSIS Right 2017    Procedure: REPAIR PTOSIS;  RIGHT UPPER LID PTOSIS REPAIR;  Surgeon: Myron Cyr MD;  Location: [facility]     UPPER GI ENDOSCOPY  10/21/2008    mild gastritis, Dr. Rocky CALDERA ECHO HEART XTHORACIC,COMPLETE, W/O DOPPLER  2004    [facility] WNL, EF 60%     Family History   Problem Relation Age of Onset     Heart Disease Father 50        heart attack     Cerebrovascular Disease Father      Diabetes Father      Hypertension Father      Depression Father      C.A.D. Father      Hypertension Mother      Arthritis Mother      Heart Disease Mother         long qt syndrome     Thyroid Disease Mother      C.A.D. Mother      Heart Disease Brother 15        MI at 15, 16.      Diabetes Maternal Uncle      Hypertension Maternal Aunt      Hypertension Maternal Uncle      Arthritis Brother         he passed away, had severe arthritis at age 11     Arthritis Maternal Uncle      Eye Disorder Maternal Uncle         cataracts     Neurologic Disorder Sister         migraines     Neurologic Disorder Sister         migraines     Respiratory Son         asthma     Cerebrovascular Disease Maternal Uncle      C.A.D. Brother      Family History Negative Other         neg for RA, SLE     Unknown/Adopted No family hx of         unknown neurological issues in her family, mother was adopted     Skin Cancer No family hx of         No known family hx of skin cancer     Physical exam:  /84   Pulse 89   Wt 77.1 kg (170 lb)   BMI 31.09  kg/m    Gen'l: appears in no acute distress, complains of pain in upper abdomen, worse on left side.  Patient declines pelvic exam, review of exam findings in December noted that IUD strings visible.    CT scan 2/10/23:   PELVIC ORGANS: Calcified uterine fibroids. IUD in fundal position,  oriented somewhat vertically, indeterminate whether the tip is within  the endometrium or myometrium (series 4 image 59), especially on  noncontrast imaging. No pelvic masses or free fluid.   IMPRESSION:   1. Normal nonenhanced CT appearance of the pancreas. No acute  abnormality in the abdomen or pelvis.  2. Fibroid uterus with an IUD place. Questioning myometrial embedment  of the IUD, not well evaluated on noncontrast CT2. Recommend pelvic  ultrasound for further evaluation.    Assessment/Plan:  56 yo with h/o AUB that has been well controlled with Mirena IUD  Mirena IUD in place for 7 years, vaginal spotting  Fibroid uterus- known for many years seen on MRI in 2016  Atrophic vaginitis, responded to treatment with vagifem    - Offered trial of IUD removal in clinic, patient declined.  Concern for possible embedded IUD so reasonable to do in OR with hysteroscopy available if needed.  Patient prefers this approach.  - Counseled likely menopausal given no bleeding now 7 years after IUD placement. Given we will be in OR and patient having irregular spotting, likely related to IUD, but perhaps post-menopausal bleeding would plan EMB at that time as well.  - reviewed safety and benefit of vaginal estrogen and improvement in vaginal discharge symptoms.  Recommend she continue and new prescription sent.    Nahed Butler MD     On the day of this encounter 30 minutes of time was spent in consultation with face-to-face discussion, review of historical information, examination, documentation and post visit activities including communication with other providers and coordination of patient care.    Nahed Butler MD

## 2023-03-16 NOTE — PATIENT INSTRUCTIONS
Thank you for trusting us with your care!     If you need to contact us for questions about:  Symptoms, Scheduling & Medical Questions; Non-urgent (2-3 day response) [place] message, Urgent (needing response today) [phone] (if after 3:30pm next day response)   Prescriptions: Please call your Pharmacy   Billing: [place] [phone] or [facility]:[phone]

## 2023-03-17 ENCOUNTER — DOCUMENTATION ONLY (OUTPATIENT)
Dept: OBGYN | Facility: CLINIC | Age: 58
End: 2023-03-17
Payer: COMMERCIAL

## 2023-03-17 ENCOUNTER — TELEPHONE (OUTPATIENT)
Dept: OBGYN | Facility: CLINIC | Age: 58
End: 2023-03-17
Payer: COMMERCIAL

## 2023-03-17 NOTE — TELEPHONE ENCOUNTER
Left VM with PT to schedule surgery on 4/28.  This is the only available date for the next few months.

## 2023-03-17 NOTE — LETTER
23    Patient: Janine Cornell  YOB: 1965    Thank you for choosing [facility] for your surgical procedure.  You will enter the hospital as an outpatient, or same-day surgery patient, which means that you will enter the hospital the day of your surgery and leave that same day.    Your procedure is scheduled on:    Date: 2023    Time: 02:00 PM    Please arrive at: 12:00 PM    Procedure: EXAM UNDER ANESTHESIA AND REMOVAL OF IUD, ENDOMETRIAL BIOPSY, POSSIBLE HYSTEROSCOPY, DIAGNOSTIC     MD: Nahed Butler MD    Location:     [address] [address]      [phone]      [email or web address]    NOTE: The times noted above may change.  A nurse from the hospital pre-admission department will call to confirm your procedure.  He or she will answer any questions you have about the procedure and will provide you with instructions for taking medications the day of the procedure.  If you have not received a call, or if you have more questions please call us one working day before your procedure. If you need to cancel or reschedule your surgery please call the surgery scheduling line at [phone].    PRE-OPERATIVE VISIT    You are required to have a pre-op physical (sometimes called an H&P, or History and Physical) 10-30 days before your procedure. This can be done at your primary care clinic or pre-operative center.  -Please call your primary care clinic to schedule this appointment. If you do not have this exam, we may postpone your procedure.  -If you do not have a primary care clinic or provider, please contact the -Preoperative Assessment Center at [phone] or Nurse Practitioner's Clinic at [phone] to schedule this appointment.  -If you are having a , you do not need this exam.    FOLLOW-UP/POST OPERATIVE VISIT    Please call now  and schedule a follow-up postoperative appointment with your surgeon.     Your visit is due approximately 2-4 weeks after your procedure.  This is scheduled for  at 8:30am.     COVID TESTING    You do not need a COVID test prior to the procedure.    Cost of Care Estimates or Insurance Questions    If you have questions regarding a cost estimate or insurance coverage, please call one of our Financial Service representatives at [phone] or submit a Pricing Inquiry Request Form    Gynecological Surgery    If you are having a gynecological surgery, please review the packet of information listed here: Before and After Your Gynecological Surgery ([email or web address])     Section Surgery    If you are having a  section, please review the packet of information listed here: Before and After Your  Birth          ([email or web address])     Home Care after Major Surgery    If you are having major surgery in your abdomen or pelvis, please review the packet of information listed here: Home Care after Major Surgery ([email or web address])    Preparing for Your Surgery  Getting started  A nurse will call you to review your health history and instructions. They will give you an arrival time based on your scheduled surgery time. Please be ready to share:    Your doctor's clinic name and phone number    Your medical, surgical, and anesthesia history    A list of allergies and sensitivities    A list of medicines, including herbal treatments and over-the-counter drugs    Whether the patient has a legal guardian (ask how to send us the papers in advance)  Please tell us if you're pregnant--or if there's any chance you might be pregnant. Some surgeries may injure a fetus (unborn baby), so they require a pregnancy test. Surgeries that are safe for a fetus don't always need a test, and you can choose whether to have one.   If you have a child who's having surgery,  please ask for a copy of Preparing for Your Child's Surgery.    Preparing for surgery  1. Within 10 to 30 days of surgery: Have a pre-op exam (sometimes called an H&P, or History and Physical). This can be done at a clinic or pre-operative center.  ? If you're having a , you may not need this exam. Talk to your care team.  2. At your pre-op exam, talk to your care team about all medicines you take. If you need to stop any medicines before surgery, ask when to start taking them again.  ? We do this for your safety. Many medicines can make you bleed too much during surgery. Some change how well surgery (anesthesia) drugs work.  3. Call your insurance company to let them know you're having surgery. (If you don't have insurance, call [phone].)  4. Call your clinic if there's any change in your health. This includes signs of a cold or flu (sore throat, runny nose, cough, rash, fever). It also includes a scrape or scratch near the surgery site.  5. If you have questions on the day of surgery, call your hospital or surgery center.  Eating and drinking guidelines  For your safety: Unless your surgeon tells you otherwise, follow the guidelines below.    Eat and drink as usual until 8 hours before you arrive for surgery. After that, no food or milk.    Drink clear liquids until 2 hours before you arrive. These are liquids you can see through, like water, Gatorade, and Propel Water. They also include plain black coffee and tea (no cream or milk), candy, and breath mints. You can spit out gum when you arrive.    If you drink alcohol: Stop drinking it the night before surgery.    If your care team tells you to take medicine on the morning of surgery, it's okay to take it with a sip of water.  Preventing infection  1. Shower or bathe the night before and morning of your surgery. Follow the instructions your clinic gave you. (If no instructions, use regular soap.)  2. Don't shave or clip hair near your surgery site.  We'll remove the hair if needed.  3. Don't smoke or vape the morning of surgery. You may chew nicotine gum up to 2 hours before surgery. A nicotine patch is okay.  ? Note: Some surgeries require you to completely quit smoking and nicotine. Check with your surgeon.  4. Your care team will make every effort to keep you safe from infection. We will:  ? Clean our hands often with soap and water (or an alcohol-based hand rub).  ? Clean the skin at your surgery site with a special soap that kills germs.  ? Give you a special gown to keep you warm. (Cold raises the risk of infection.)  ? Wear special hair covers, masks, gowns and gloves during surgery.  ? Give antibiotic medicine, if prescribed. Not all surgeries need antibiotics.  What to bring on the day of surgery  1. Photo ID and insurance card  2. Copy of your health care directive, if you have one  3. Glasses and hearing aids (bring cases)  ? You can't wear contacts during surgery  4. Inhaler and eye drops, if you use them (tell us about these when you arrive)  5. CPAP machine or breathing device, if you use them  6. A few personal items, if spending the night  7. If you have . . .  ? A pacemaker, ICD (cardiac defibrillator) or other implant: Bring the ID card.  ? An implanted stimulator: Bring the remote control.  ? A legal guardian: Bring a copy of the certified (court-stamped) guardianship papers.  Please remove any jewelry, including body piercings. Leave jewelry and other valuables at home.  If you're going home the day of surgery    You must have a responsible adult drive you home. They should stay with you overnight as well.    If you don't have someone to stay with you, and you aren't safe to go home alone, we may keep you overnight. Insurance often won't pay for this.  After surgery  If it's hard to control your pain or you need more pain medicine, please call your surgeon's office.  Questions?   If you have any questions for your care team, list them here:  ________________________________________________________________________________________________________________________________________________________________________________________________________________________________________________________________________________________  For informational purposes only. Not to replace the advice of your health care provider. Copyright   2003, 2019 [facility]. All rights reserved. Clinically reviewed by Winnie Soliman MD. [employer] 112577 - REV 12/22.

## 2023-03-17 NOTE — TELEPHONE ENCOUNTER
Spoke with patient, scheduled surgery. Patient is aware of date, time, location, prep instructions, need for H&P within 30 days.    Type of surgery: EXAM UNDER ANESTHESIA AND REMOVAL OF IUD, ENDOMETRIAL BIOPSY, POSSIBLE HYSTEROSCOPY, DIAGNOSTIC   Location of surgery: [facility]  Date and time of surgery: 04/28/2023 2:00pm  Surgeon: Carrie  Pre-Op H&P Date:Will schedule with PCP  Post-Op Appt Date: 6/16/2023   Packet sent out: Yes  Pre-cert/Authorization completed:  No  Date: 03/17/2023

## 2023-03-23 DIAGNOSIS — E11.9 TYPE 2 DIABETES, HBA1C GOAL < 7% (H): ICD-10-CM

## 2023-03-24 ENCOUNTER — TELEPHONE (OUTPATIENT)
Dept: OBGYN | Facility: CLINIC | Age: 58
End: 2023-03-24
Payer: COMMERCIAL

## 2023-03-24 NOTE — TELEPHONE ENCOUNTER
3/9/2023  [facility] [place]   Kash Solano MD  Family Medicine

## 2023-03-24 NOTE — TELEPHONE ENCOUNTER
Left voicemail for patient that patient was referred to Preoperative Assessment Center (PAC) by Dr. Butler. Informed that they do anesthesia consult and pre-op physical (H&P). Left scheduling phone number for PAC clinic on patient's voicemail (*[phone]).    Keren Mahoney  Surgery Scheduler  [facility]

## 2023-03-24 NOTE — TELEPHONE ENCOUNTER
[facility] Call Center    Phone Message    May a detailed message be left on voicemail: yes     Reason for Call: Patient wanted writer to request to have a detailed message left on her voicemail if Dr. Butler wants her to go to PAC along with her PCP. She also stated the phone number for PAC she was given didn't go through when she tried calling. Thank you.    Action Taken: Message routed to:  Other: [facility]    Travel Screening: Not Applicable

## 2023-03-25 DIAGNOSIS — E11.9 TYPE 2 DIABETES, HBA1C GOAL < 7% (H): ICD-10-CM

## 2023-03-27 ENCOUNTER — TELEPHONE (OUTPATIENT)
Dept: GASTROENTEROLOGY | Facility: CLINIC | Age: 58
End: 2023-03-27
Payer: COMMERCIAL

## 2023-03-27 DIAGNOSIS — E55.9 VITAMIN D DEFICIENCY: ICD-10-CM

## 2023-03-27 NOTE — TELEPHONE ENCOUNTER
Caller: Janine  Reason for Reschedule/Cancellation (please be detailed, any staff messages or encounters to note?): add EGD to procedure      Prior to reschedule please review:    Ordering Provider:Kash Hitchcock    Sedation per order:Moderate    Does patient have any ASC Exclusions, please identify?: No      Notes on Cancelled Procedure:    Procedure:Lower Endoscopy [Colonoscopy]     Date: 4/25/2023    Location:[facility] [place]; [address] [place] [address], [place] [address]    Surgeon: Toy        Rescheduled: Yes    Procedure: Upper and Lower Endoscopy [EGD and Colonoscopy]     Date: 5/25/2023    Location:[facility] [place]; [address] [place] [address], [place] [address]    Surgeon: Ginny    Sedation Level Scheduled  MAC,  Reason for Sedation Level Block    Prep/Instructions updated and sent: Letter

## 2023-03-28 ENCOUNTER — INFUSION THERAPY VISIT (OUTPATIENT)
Dept: INFUSION THERAPY | Facility: CLINIC | Age: 58
End: 2023-03-28
Attending: INTERNAL MEDICINE
Payer: COMMERCIAL

## 2023-03-28 VITALS
DIASTOLIC BLOOD PRESSURE: 80 MMHG | SYSTOLIC BLOOD PRESSURE: 121 MMHG | HEART RATE: 82 BPM | OXYGEN SATURATION: 98 % | TEMPERATURE: 98.3 F | RESPIRATION RATE: 16 BRPM

## 2023-03-28 DIAGNOSIS — E11.9 TYPE 2 DIABETES, HBA1C GOAL < 8% (H): ICD-10-CM

## 2023-03-28 DIAGNOSIS — M31.30 GRANULOMATOSIS WITH POLYANGIITIS WITHOUT RENAL INVOLVEMENT (H): ICD-10-CM

## 2023-03-28 DIAGNOSIS — I77.82 ANCA-ASSOCIATED VASCULITIS (H): Primary | ICD-10-CM

## 2023-03-28 DIAGNOSIS — I77.82 ANCA-ASSOCIATED VASCULITIS (H): ICD-10-CM

## 2023-03-28 PROCEDURE — 96415 CHEMO IV INFUSION ADDL HR: CPT

## 2023-03-28 PROCEDURE — 96413 CHEMO IV INFUSION 1 HR: CPT

## 2023-03-28 PROCEDURE — 250N000013 HC RX MED GY IP 250 OP 250 PS 637: Performed by: INTERNAL MEDICINE

## 2023-03-28 PROCEDURE — 96375 TX/PRO/DX INJ NEW DRUG ADDON: CPT

## 2023-03-28 PROCEDURE — 258N000003 HC RX IP 258 OP 636: Performed by: INTERNAL MEDICINE

## 2023-03-28 PROCEDURE — 250N000011 HC RX IP 250 OP 636: Performed by: INTERNAL MEDICINE

## 2023-03-28 RX ORDER — METHYLPREDNISOLONE SODIUM SUCCINATE 125 MG/2ML
125 INJECTION, POWDER, LYOPHILIZED, FOR SOLUTION INTRAMUSCULAR; INTRAVENOUS
Status: CANCELLED
Start: 2023-03-28

## 2023-03-28 RX ORDER — PEN NEEDLE, DIABETIC 32GX 5/32"
NEEDLE, DISPOSABLE MISCELLANEOUS
Qty: 100 EACH | Refills: 3 | Status: SHIPPED | OUTPATIENT
Start: 2023-03-28 | End: 2024-06-07

## 2023-03-28 RX ORDER — EPINEPHRINE 1 MG/ML
0.3 INJECTION, SOLUTION, CONCENTRATE INTRAVENOUS EVERY 5 MIN PRN
Status: CANCELLED | OUTPATIENT
Start: 2023-03-28

## 2023-03-28 RX ORDER — ALBUTEROL SULFATE 90 UG/1
1-2 AEROSOL, METERED RESPIRATORY (INHALATION)
Status: CANCELLED
Start: 2023-03-28

## 2023-03-28 RX ORDER — ACETAMINOPHEN 325 MG/1
650 TABLET ORAL ONCE
Status: CANCELLED
Start: 2023-03-28

## 2023-03-28 RX ORDER — METHYLPREDNISOLONE SODIUM SUCCINATE 125 MG/2ML
81.25 INJECTION, POWDER, LYOPHILIZED, FOR SOLUTION INTRAMUSCULAR; INTRAVENOUS ONCE
Status: COMPLETED | OUTPATIENT
Start: 2023-03-28 | End: 2023-03-28

## 2023-03-28 RX ORDER — ALBUTEROL SULFATE 0.83 MG/ML
2.5 SOLUTION RESPIRATORY (INHALATION)
Status: CANCELLED | OUTPATIENT
Start: 2023-03-28

## 2023-03-28 RX ORDER — METHYLPREDNISOLONE SODIUM SUCCINATE 125 MG/2ML
81.25 INJECTION, POWDER, LYOPHILIZED, FOR SOLUTION INTRAMUSCULAR; INTRAVENOUS ONCE
Status: CANCELLED | OUTPATIENT
Start: 2023-03-28

## 2023-03-28 RX ORDER — METFORMIN HCL 500 MG
2000 TABLET, EXTENDED RELEASE 24 HR ORAL
Qty: 360 TABLET | Refills: 3 | Status: SHIPPED | OUTPATIENT
Start: 2023-03-28 | End: 2023-10-18

## 2023-03-28 RX ORDER — DIPHENHYDRAMINE HYDROCHLORIDE 50 MG/ML
50 INJECTION INTRAMUSCULAR; INTRAVENOUS
Status: CANCELLED
Start: 2023-03-28

## 2023-03-28 RX ORDER — HEPARIN SODIUM,PORCINE 10 UNIT/ML
5 VIAL (ML) INTRAVENOUS
Status: CANCELLED | OUTPATIENT
Start: 2023-03-28

## 2023-03-28 RX ORDER — MEPERIDINE HYDROCHLORIDE 25 MG/ML
25 INJECTION INTRAMUSCULAR; INTRAVENOUS; SUBCUTANEOUS EVERY 30 MIN PRN
Status: CANCELLED | OUTPATIENT
Start: 2023-03-28

## 2023-03-28 RX ORDER — HEPARIN SODIUM (PORCINE) LOCK FLUSH IV SOLN 100 UNIT/ML 100 UNIT/ML
5 SOLUTION INTRAVENOUS
Status: CANCELLED | OUTPATIENT
Start: 2023-03-28

## 2023-03-28 RX ORDER — ACETAMINOPHEN 325 MG/1
650 TABLET ORAL ONCE
Status: COMPLETED | OUTPATIENT
Start: 2023-03-28 | End: 2023-03-28

## 2023-03-28 RX ADMIN — METHYLPREDNISOLONE SODIUM SUCCINATE 81.25 MG: 125 INJECTION, POWDER, FOR SOLUTION INTRAMUSCULAR; INTRAVENOUS at 09:22

## 2023-03-28 RX ADMIN — DIPHENHYDRAMINE HYDROCHLORIDE 50 MG: 50 INJECTION, SOLUTION INTRAMUSCULAR; INTRAVENOUS at 09:41

## 2023-03-28 RX ADMIN — ACETAMINOPHEN 650 MG: 325 TABLET ORAL at 09:22

## 2023-03-28 RX ADMIN — RITUXIMAB-ABBS 1000 MG: 10 INJECTION, SOLUTION INTRAVENOUS at 10:31

## 2023-03-28 NOTE — PROGRESS NOTES
Infusion Nursing Note:  Janine Cornell presents today for Truxima infusion.      Patient seen by provider today: No   present during visit today: Not Applicable.    Note:    Intravenous Access:  Peripheral IV placed.    Treatment Conditions:  Biological Infusion Checklist:  ~~~ NOTE: If the patient answers yes to any of the questions below, hold the infusion and contact ordering provider or on-call provider.    1. Have you recently had an elevated temperature, fever, chills, productive cough, coughing for 3 weeks or longer or hemoptysis, abnormal vital signs, night sweats,  chest pain or have you noticed a decrease in your appetite, unexplained weight loss or fatigue? No  2. Do you have any open wounds or new incisions? No  3. Do you have any recent or upcoming hospitalizations, surgeries or dental procedures? No  4. Do you currently have or recently have had any signs of illness or infection or are you on any antibiotics? No  5. Have you had any new, sudden or worsening abdominal pain? No  6. Have you or anyone in your household received a live vaccination in the past 4 weeks? Please note:  No live vaccines while on biologic/chemotherapy until 6 months after the last treatment.  Patient can receive the flu vaccine (shot only) and the pneumovax.  It is optimal for the patient to get these vaccines mid cycle, but they can be given at any time as long as it is not on the day of the infusion. No  7. Have you recently been diagnosed with any new nervous system diseases (ie. Multiple sclerosis, Guillain Freedom, seizures, neurological changes) or cancer diagnosis? No  8. Are you on any form of radiation or chemotherapy? No  9. Are you pregnant or breast feeding or do you have plans of pregnancy in the future? No  10. Have you been having any signs of worsening depression or suicidal ideations?  (benlysta only) No  11. Have there been any other new onset medical symptoms? No      Post Infusion Assessment:  Patient  tolerated infusion without incident.  No evidence of extravasations.  Access discontinued per protocol.     Discharge Plan:   Discharge instructions reviewed with: Patient.  Patient and/or family verbalized understanding of discharge instructions and all questions answered.  Patient discharged in stable condition accompanied by: self.  Departure Mode: Ambulatory.      Carolina Patino RN

## 2023-03-29 RX ORDER — ERGOCALCIFEROL 1.25 MG/1
CAPSULE, LIQUID FILLED ORAL
Qty: 4 CAPSULE | OUTPATIENT
Start: 2023-03-29

## 2023-03-29 RX ORDER — CHOLECALCIFEROL (VITAMIN D3) 50 MCG
1 TABLET ORAL DAILY
Qty: 90 TABLET | Refills: 3 | Status: SHIPPED | OUTPATIENT
Start: 2023-03-29 | End: 2023-04-25

## 2023-03-29 NOTE — TELEPHONE ENCOUNTER
Response received from provider:    Majo Mckinnon MD  You 1 minute ago (11:44 AM)     PF  Plz refill as 2000 units every day, #90, 3 refills.      You  Majo Mckinnon MD 3 minutes ago (11:42 AM)     RA  High dose Vit D, given more than once per month - requires provider review        New order placed per Provider request for Vit D 2000 units daily. Requested refill refused due to adjustment in therapy.

## 2023-03-29 NOTE — TELEPHONE ENCOUNTER
VITAMIN D2 50,000IU (ERGO) CAP RX      Last Written Prescription Date:  2/27/23  Last Fill Quantity: 4,   # refills: 0  Last Office Visit : 3/9/23  Future Office visit:  4/12/23    Routing refill request to provider for review/approval because:  High dose Vit D, given more than once per month - requires provider review

## 2023-03-31 NOTE — TELEPHONE ENCOUNTER
Left voicemail for patient - per response from Dr. Butler, she would still like patient to have a PAC consult regardless of seeing PCP due to cardiac history. Left PAC scheduling number on voicemail for patient ([phone]).    Keren Mahoney  Surgery Scheduler  [facility]

## 2023-04-03 DIAGNOSIS — E55.9 VITAMIN D DEFICIENCY: ICD-10-CM

## 2023-04-03 NOTE — TELEPHONE ENCOUNTER
FUTURE VISIT INFORMATION      SURGERY INFORMATION:    Date: 23    Location: ur or    Surgeon:  Nahed Butler MD    Anesthesia Type:  MAC with Local    Procedure: EXAM UNDER ANESTHESIA Remove intrauterine device, ENDOMETRIAL BIOPSY , POSSIBLE HYSTEROSCOPY, DIAGNOSTIC    Consult: ov 3/16/23    RECORDS REQUESTED FROM:       Primary Care Provider: Kash Solano MD- [facility]    Pertinent Medical History: hypertension, NSTEMI, ASCVD, Ischemic cardiomyopathy, CAD    Most recent EKG+ Tracin21    Most recent ECHO: 22    Most recent PFT's: 3/27/17    Most recent Sleep Study:  10/5/16

## 2023-04-06 RX ORDER — ERGOCALCIFEROL 1.25 MG/1
CAPSULE, LIQUID FILLED ORAL
Qty: 4 CAPSULE | Refills: 0 | OUTPATIENT
Start: 2023-04-06

## 2023-04-11 ENCOUNTER — TELEPHONE (OUTPATIENT)
Dept: ENDOCRINOLOGY | Facility: CLINIC | Age: 58
End: 2023-04-11
Payer: COMMERCIAL

## 2023-04-12 ENCOUNTER — LAB (OUTPATIENT)
Dept: LAB | Facility: CLINIC | Age: 58
End: 2023-04-12
Payer: COMMERCIAL

## 2023-04-12 ENCOUNTER — PRE VISIT (OUTPATIENT)
Dept: ENDOCRINOLOGY | Facility: CLINIC | Age: 58
End: 2023-04-12

## 2023-04-12 ENCOUNTER — OFFICE VISIT (OUTPATIENT)
Dept: FAMILY MEDICINE | Facility: CLINIC | Age: 58
End: 2023-04-12
Payer: COMMERCIAL

## 2023-04-12 ENCOUNTER — OFFICE VISIT (OUTPATIENT)
Dept: ENDOCRINOLOGY | Facility: CLINIC | Age: 58
End: 2023-04-12
Attending: FAMILY MEDICINE
Payer: COMMERCIAL

## 2023-04-12 VITALS
HEART RATE: 99 BPM | DIASTOLIC BLOOD PRESSURE: 80 MMHG | SYSTOLIC BLOOD PRESSURE: 116 MMHG | WEIGHT: 164 LBS | BODY MASS INDEX: 30 KG/M2 | OXYGEN SATURATION: 99 %

## 2023-04-12 VITALS
WEIGHT: 164.5 LBS | HEART RATE: 99 BPM | OXYGEN SATURATION: 98 % | SYSTOLIC BLOOD PRESSURE: 116 MMHG | DIASTOLIC BLOOD PRESSURE: 80 MMHG | BODY MASS INDEX: 30.09 KG/M2

## 2023-04-12 DIAGNOSIS — R19.7 DIARRHEA, UNSPECIFIED TYPE: ICD-10-CM

## 2023-04-12 DIAGNOSIS — Z79.4 TYPE 2 DIABETES MELLITUS WITH OTHER CIRCULATORY COMPLICATION, WITH LONG-TERM CURRENT USE OF INSULIN (H): Primary | ICD-10-CM

## 2023-04-12 DIAGNOSIS — R19.7 DIARRHEA, UNSPECIFIED TYPE: Primary | ICD-10-CM

## 2023-04-12 DIAGNOSIS — D72.829 LEUKOCYTOSIS, UNSPECIFIED TYPE: ICD-10-CM

## 2023-04-12 DIAGNOSIS — E11.9 TYPE 2 DIABETES, HBA1C GOAL < 7% (H): ICD-10-CM

## 2023-04-12 DIAGNOSIS — E11.59 TYPE 2 DIABETES MELLITUS WITH OTHER CIRCULATORY COMPLICATION, WITH LONG-TERM CURRENT USE OF INSULIN (H): Primary | ICD-10-CM

## 2023-04-12 LAB
BASOPHILS # BLD AUTO: 0.1 10E3/UL (ref 0–0.2)
BASOPHILS NFR BLD AUTO: 0 %
EOSINOPHIL # BLD AUTO: 0.2 10E3/UL (ref 0–0.7)
EOSINOPHIL NFR BLD AUTO: 1 %
ERYTHROCYTE [DISTWIDTH] IN BLOOD BY AUTOMATED COUNT: 14.9 % (ref 10–15)
HCT VFR BLD AUTO: 43.7 % (ref 35–47)
HGB BLD-MCNC: 14.3 G/DL (ref 11.7–15.7)
IMM GRANULOCYTES # BLD: 0 10E3/UL
IMM GRANULOCYTES NFR BLD: 0 %
LYMPHOCYTES # BLD AUTO: 2.9 10E3/UL (ref 0.8–5.3)
LYMPHOCYTES NFR BLD AUTO: 21 %
MCH RBC QN AUTO: 25.4 PG (ref 26.5–33)
MCHC RBC AUTO-ENTMCNC: 32.7 G/DL (ref 31.5–36.5)
MCV RBC AUTO: 78 FL (ref 78–100)
MONOCYTES # BLD AUTO: 0.8 10E3/UL (ref 0–1.3)
MONOCYTES NFR BLD AUTO: 6 %
NEUTROPHILS # BLD AUTO: 10.2 10E3/UL (ref 1.6–8.3)
NEUTROPHILS NFR BLD AUTO: 72 %
NRBC # BLD AUTO: 0 10E3/UL
NRBC BLD AUTO-RTO: 0 /100
PLATELET # BLD AUTO: 397 10E3/UL (ref 150–450)
RBC # BLD AUTO: 5.64 10E6/UL (ref 3.8–5.2)
WBC # BLD AUTO: 14.2 10E3/UL (ref 4–11)

## 2023-04-12 PROCEDURE — 99205 OFFICE O/P NEW HI 60 MIN: CPT | Performed by: STUDENT IN AN ORGANIZED HEALTH CARE EDUCATION/TRAINING PROGRAM

## 2023-04-12 PROCEDURE — 36415 COLL VENOUS BLD VENIPUNCTURE: CPT | Performed by: PATHOLOGY

## 2023-04-12 PROCEDURE — 99214 OFFICE O/P EST MOD 30 MIN: CPT | Performed by: FAMILY MEDICINE

## 2023-04-12 PROCEDURE — 85025 COMPLETE CBC W/AUTO DIFF WBC: CPT | Performed by: PATHOLOGY

## 2023-04-12 ASSESSMENT — PAIN SCALES - GENERAL: PAINLEVEL: SEVERE PAIN (6)

## 2023-04-12 NOTE — NURSING NOTE
Janine Cornell is a 57 year old female that presents in clinic today for the following:     Chief Complaint   Patient presents with     Follow Up     Pt here for follow up and wants to discuss pain in extremities        The patient's allergies and medications were reviewed. The patient's vitals were obtained without incident. The patient does not have any other questions for the provider.     Demarco Vernon, EMT at 2:43 PM on 4/12/2023.  Primary Care Clinic: [phone]
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
## 2023-04-12 NOTE — PROGRESS NOTES
"    Assessment & Plan     Diarrhea, unspecified type    - Stool Enteric Bacteria and Virus Panel  - Ova and Parasite Exam Routine; Future  - CBC with platelets and differential; Future    Leukocytosis, unspecified type  Recheck    She will discuss thyroid and DM at Endo visit today  Reviewed her autoimmune disorders/notes/labs at length  Also upcoming procedures (Gyn, GI)  She will see me in June, rvw all        38 minutes spent by me on the date of the encounter doing chart review, history and exam, documentation and further activities per the note    BMI:   Estimated body mass index is 30.09 kg/m  as calculated from the following:    Height as of 3/9/23: 1.575 m (5' 2\").    Weight as of this encounter: 74.6 kg (164 lb 8 oz).     No follow-ups on file.    Kash Solano MD  [facility] [place]    Adrienne Mehta is a 57 year old, presenting for the following health issues:  Follow Up (Pt here for follow up and wants to discuss pain in extremities )         View : No data to display.              HPI   Here for a few things  One, reviewed at length autoimmune diagnosis, meds for, has a lot of myalgias and arthralgias, did recently get rituxan dose, has labs for in May  Two, has thyroid disease and DM, sees Endo right after me today, she plans to address both  Three, Gyn having procedure soon to remove IUD, going to PAC for preop, did not get preop materials, I've at her behest reached out to Gyn to get those to her  Four, doing egd/colonsocpy soon, likewise did not get materials/rx, on her behest I've reached out to GI to get those to her; still has diarrhea, will do second o and p, and multitest (ordered, not done). On description does not sound like fatty stool, not float  Five, hi wbc, no clear cause known recheck today  No ID sx on ROS  Past Medical History:   Diagnosis Date     Abnormal glandular Papanicolaou smear of cervix 1992     Allergic rhinitis     Allergy, airborne " subst     Arthritis      ASCVD (arteriosclerotic cardiovascular disease)      Chronic pain      Chronic pancreatitis (H)     idiopathic, Tx: PPI, antioxidants, pancreatic enzymes     Common migraine      Coronary artery disease      Costochondritis      Difficulty in walking(719.7)      Dyspnea on exertion      Ectasia, mammary duct     [facility], persistent nipple discharge     Elevated fasting glucose      Gastro-oesophageal reflux disease      Granulomatosis with polyangiitis (H)      Hernia      History of angina      Hyperlipidemia LDL goal < 100      Hypertension goal BP (blood pressure) < 140/90     Essential hypertension     Iron deficiency anemia      Ischemic cardiomyopathy      Menorrhagia      Migraine headaches      Mild persistent asthma      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 Matias, neg w/u, Rx'd w/prednisone     Ventral hernia, unspecified, without mention of obstruction or gangrene 2012     Past Surgical History:   Procedure Laterality Date     C/SECTION, LOW TRANSVERSE  1996         CARDIAC SURGERY      cardiac stent- recent in 16; 4 other stents     DILATION AND CURETTAGE N/A 2016    Procedure: DILATION AND CURETTAGE;  Surgeon: Nahed Butler MD;  Location: [facility]     ESOPHAGOSCOPY,  GASTROSCOPY, DUODENOSCOPY (EGD), COMBINED N/A 03/31/2022    Procedure: ESOPHAGOGASTRODUODENOSCOPY, WITH BIOPSY;  Surgeon: Enzo Sesay MD;  Location:  GI     [facility] UGI ENDOSCOPY W EUS  11/07/2008    Dr. Pastrana, possible chronic pancreatitis, fatty liver     HERNIA REPAIR  12/01/2012    done at [facility] [place]     INSERT INTRAUTERINE DEVICE N/A 07/06/2016    Procedure: INSERT INTRAUTERINE DEVICE;  Surgeon: Nahed Butler MD;  Location: [facility] OR     INT UTERINE FIBRIOD EMBOLIZATION  10/29/2014     LAPAROSCOPIC CHOLECYSTECTOMY  05/28/2008    Dr. Arnol GRUBBS TX, CERVICAL  1992    s/p LEEP, done at [facility] in [place].     ORBITOTOMY Right 03/15/2016    Procedure: ORBITOTOMY;  Surgeon: Myron Cyr MD;  Location:  [facility]     ORBITOTOMY Right 08/04/2017    Procedure: ORBITOTOMY;  RIGHT ORBITOTOMY AND BIOPSY;  Surgeon: Charis Holbrook MD;  Location: [facility]     REPAIR PTOSIS Right 11/17/2017    Procedure: REPAIR PTOSIS;  RIGHT UPPER LID PTOSIS REPAIR;  Surgeon: Myron Cyr MD;  [facility]     UPPER GI ENDOSCOPY  10/21/2008    mild gastritis, Dr. Rocky [facility] ECHO HEART XTHORACIC,COMPLETE, W/O DOPPLER  02/04/2004    [facility] WNL, EF 60%     Current Outpatient Medications   Medication     acetaminophen (TYLENOL) 325 MG tablet     ADVAIR DISKUS 250-50 MCG/ACT inhaler     albuterol (2.5 MG/3ML) 0.083% neb solution     albuterol (PROAIR HFA, PROVENTIL HFA, VENTOLIN HFA) 108 (90 BASE) MCG/ACT inhaler     BANOPHEN 25 MG tablet     blood glucose (NO BRAND SPECIFIED) lancets standard     blood glucose (NO BRAND SPECIFIED) test strip     Blood Pressure Monitor KIT     calcium carbonate (TUMS) 500 MG chewable tablet     clopidogrel (PLAVIX) 75 MG tablet     COMPRESSION STOCKINGS     cyclobenzaprine (FLEXERIL) 10 MG tablet     dicyclomine (BENTYL) 20 MG tablet     diphenhydrAMINE (BENADRYL) 25 MG capsule     EPIPEN 2-RIKY 0.3 MG/0.3ML injection     Ergocalciferol (VITAMIN D2) 50 MCG  (2000 UT) TABS     esomeprazole (NEXIUM) 40 MG DR capsule     estradiol (VAGIFEM) 10 MCG TABS vaginal tablet     famotidine (PEPCID) 20 MG tablet     fexofenadine (ALLEGRA) 180 MG tablet     fluticasone (FLONASE) 50 MCG/ACT nasal spray     folic acid (FOLVITE) 1 MG tablet     hydrocortisone (CORTAID) 1 % external cream     insulin pen needle (BD PEN NEEDLE MARCK 2ND GEN) 32G X 4 MM miscellaneous     levocetirizine (XYZAL) 5 MG tablet     levonorgestrel (MIRENA) 20 MCG/DAY IUD     lisinopril-hydrochlorothiazide (ZESTORETIC) 20-25 MG tablet     magnesium 250 MG tablet     metFORMIN (GLUCOPHAGE XR) 500 MG 24 hr tablet     metoprolol succinate ER (TOPROL XL) 100 MG 24 hr tablet     Multiple Vitamin (TAB-A-GERALD) TABS     nitroGLYcerin (NITROSTAT) 0.4 MG sublingual tablet     olopatadine (PATANOL) 0.1 % ophthalmic solution     pravastatin (PRAVACHOL) 40 MG tablet     prochlorperazine (COMPAZINE) 5 MG tablet     sennosides (SENOKOT) 8.6 MG tablet     spironolactone (ALDACTONE) 25 MG tablet     sucralfate (CARAFATE) 1 GM tablet     traMADol (ULTRAM) 50 MG tablet     vitamin D2 (ERGOCALCIFEROL) 86964 units (1250 mcg) capsule     vitamin D3 (CHOLECALCIFEROL) 50 mcg (2000 units) tablet     Continuous Blood Gluc  (FREESTYLE TO 2 READER) ALESHIA     Continuous Blood Gluc Sensor (FREESTYLE TO 2 SENSOR) MISC     empagliflozin (JARDIANCE) 10 MG TABS tablet     fluconazole (DIFLUCAN) 200 MG tablet     insulin glargine (LANTUS PEN) 100 UNIT/ML pen     ketoconazole (NIZORAL) 2 % shampoo     levofloxacin (LEVAQUIN) 500 MG tablet     terbinafine (LAMISIL) 1 % external cream     terconazole (TERAZOL 3) 80 MG vaginal suppository     No current facility-administered medications for this visit.     Allergies   Allergen Reactions     Amoxicillin-Pot Clavulanate      Artificial Sweetner (Informational Only)  Other (See Comments)     Increased headache     Augmentin      Unknown possible hives and edema     Azithromycin       Clavulanic Acid      Diatrizoate Other (See Comments)     Pt wants this listed because she is allergic to shellfish      Imitrex [Sumatriptan]      Severe face/neck/chest tightness and flushing side effects      Penicillins Hives     Unknown      Pork Allergy      Stomach pain, cramping, diarrhea, itching, nausea and headaches     Shellfish Allergy Hives and Swelling     Shellfish-Derived Products      Sulfa Drugs Hives and Swelling     Sulfatolamide      Zithromax [Azithromycin Dihydrate] Swelling     Unknown                Review of Systems         Objective    /80 (BP Location: Right arm, Patient Position: Sitting, Cuff Size: Adult Large)   Pulse 99   Wt 74.6 kg (164 lb 8 oz)   SpO2 98%   BMI 30.09 kg/m    Body mass index is 30.09 kg/m .  Physical Exam   GENERAL: healthy, alert and no distress  MS: no gross musculoskeletal defects noted, no edema
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
## 2023-04-14 ENCOUNTER — TELEPHONE (OUTPATIENT)
Dept: RHEUMATOLOGY | Facility: CLINIC | Age: 58
End: 2023-04-14
Payer: COMMERCIAL

## 2023-04-14 NOTE — TELEPHONE ENCOUNTER
Returned call to patient.  Patient has been trying to reach Charlotte for 2 weeks, previous RN, her voicemail says you can leave a message. Will have manager fix this issue.    Patient will fax the forms that need to be filled out on Monday. This RN discussed that Dr. Mckinnon is not in clinic until Thursday.  Will follow-up with patient once forms completed.    Provided patient with call number to this RN.    ISHA PhillipsN, RN  RN Care Coordinator Rheumatology

## 2023-04-14 NOTE — TELEPHONE ENCOUNTER
[facility] Call Center    Phone Message    May a detailed message be left on voicemail: yes     Reason for Call: Other: Pt is calling to speak to someone about her medical request form that is required for [facility] that is due by 4/20/  Please call pt back at [phone].    Action Taken: Message routed to:  [facility] ([facility] Rheumatology     Travel Screening: Not Applicable

## 2023-04-17 ENCOUNTER — TELEPHONE (OUTPATIENT)
Dept: ENDOCRINOLOGY | Facility: CLINIC | Age: 58
End: 2023-04-17

## 2023-04-17 NOTE — TELEPHONE ENCOUNTER
[facility] Call Center    Phone Message    May a detailed message be left on voicemail: yes     Reason for Call: Other: Patient would like to speak to her care team regarding her change to dosing on her Jardiance as well as getting the BG monitor, she does not feel comfortable with the change in dosage or the monitor, please advise thank you     Action Taken: Other: endo    Travel Screening: Not Applicable

## 2023-04-17 NOTE — TELEPHONE ENCOUNTER
Prior Authorization Retail Medication Request    Medication/Dose: [id number] MG TABS  .  Take 2 tablets (20 mg) by mouth daily     ICD code (if different than what is on RX):  E11.9    Previously Tried and Failed:    Rationale:  Type 2 diabetes, HbA1c goal < 7%     [facility]   Insurance ID:  [id number]       Pharmacy Information (if different than what is on RX)  Name:    Phone:

## 2023-04-19 NOTE — TELEPHONE ENCOUNTER
Form Request Documentation    Type of form: [facility] - Request for Medical Opinion  Date form received: 4/19/2023  Date form is due: 4/20/2023  Provider: Pratibha  Last appointment: 3/9/2023  Next appointment: 9/1/2023  Have we filled out this form in the past? Yes, 8/19/2022  Information needed from patient: none  Status of form: placed in provider signature folder. Provider in clinic 4/20/2023 to review form.    Beulah Fortune CMA   4/19/2023 12:04 PM

## 2023-04-20 NOTE — TELEPHONE ENCOUNTER
Form completed and faxed to [facility]. Call placed to patient without success. Left HIPPA compliant message asking patient to call for this information.     Beulah Fortune CMA   4/20/2023 3:22 PM

## 2023-04-21 NOTE — TELEPHONE ENCOUNTER
Central Prior Authorization Team   Phone: [phone]    PA Initiation    Medication: Jardiance -  Insurance Company: [employer] - Phone [phone] Fax [phone]  Pharmacy Filling the Rx: [facility] [place], [place] - [address] [place] [address] [phone]  Filling Pharmacy Phone: [phone]  Filling Pharmacy Fax:    Start Date: 4/21/2023    Initiate PA Quantity Limit by phone [phone] Case # [id number] **Be Advised: medication is covered quantity limit of 30 per Fill/Dispense **

## 2023-04-21 NOTE — TELEPHONE ENCOUNTER
Prior Authorization Not Needed per Insurance    Medication: Jardiance -PA NOT NEEDED   Insurance Company: [employer] - Phone [phone] Fax [phone]  Expected CoPay:      Pharmacy Filling the Rx: [facility] [place], [place] - [address] [place] [address] [phone]  Pharmacy Notified: No  Patient Notified: No    PA is Not Needed and medication is covered. Filing pharmacy has a paid claim medication quantity 30 for 15 day supply as noted below.

## 2023-04-24 NOTE — TELEPHONE ENCOUNTER
PRIOR AUTHORIZATION DENIED    Medication: Jardiance -PA QTY LIMIT DENIED     Denial Date: 4/21/2023    Denial Rational:         Appeal Information:

## 2023-04-25 ENCOUNTER — PRE VISIT (OUTPATIENT)
Dept: SURGERY | Facility: CLINIC | Age: 58
End: 2023-04-25

## 2023-04-25 ENCOUNTER — ANESTHESIA EVENT (OUTPATIENT)
Dept: SURGERY | Facility: CLINIC | Age: 58
End: 2023-04-25
Payer: COMMERCIAL

## 2023-04-25 ENCOUNTER — TELEPHONE (OUTPATIENT)
Dept: ENDOCRINOLOGY | Facility: CLINIC | Age: 58
End: 2023-04-25

## 2023-04-25 ENCOUNTER — OFFICE VISIT (OUTPATIENT)
Dept: SURGERY | Facility: CLINIC | Age: 58
End: 2023-04-25
Payer: COMMERCIAL

## 2023-04-25 VITALS
BODY MASS INDEX: 31.94 KG/M2 | TEMPERATURE: 98.3 F | SYSTOLIC BLOOD PRESSURE: 128 MMHG | HEIGHT: 62 IN | RESPIRATION RATE: 16 BRPM | DIASTOLIC BLOOD PRESSURE: 83 MMHG | OXYGEN SATURATION: 99 % | WEIGHT: 173.6 LBS | HEART RATE: 72 BPM

## 2023-04-25 DIAGNOSIS — Z01.818 PREOP EXAMINATION: Primary | ICD-10-CM

## 2023-04-25 DIAGNOSIS — Z30.431 IUD CHECK UP: ICD-10-CM

## 2023-04-25 DIAGNOSIS — D25.1 INTRAMURAL LEIOMYOMA OF UTERUS: ICD-10-CM

## 2023-04-25 DIAGNOSIS — R93.5 ABNORMAL CT SCAN, PELVIS: ICD-10-CM

## 2023-04-25 PROCEDURE — 99204 OFFICE O/P NEW MOD 45 MIN: CPT | Performed by: PHYSICIAN ASSISTANT

## 2023-04-25 ASSESSMENT — LIFESTYLE VARIABLES: TOBACCO_USE: 1

## 2023-04-25 ASSESSMENT — PAIN SCALES - GENERAL: PAINLEVEL: MODERATE PAIN (4)

## 2023-04-25 NOTE — TELEPHONE ENCOUNTER
Left message requesting blood sugar data for Krissy to review. Also asked for patient's email address and permission to connect patient to [employer].  Noemí Mendoza

## 2023-04-25 NOTE — TELEPHONE ENCOUNTER
Nothing further is needed  from Endocrine. The upcoming appointment with Krissy Danielle was cancelled. She did see Dr Norman already and will follow her protocol.   Plan:   - decrease lantus to 65 units daily, might need further decrease based on SMBG  - continue metformin  - increase jardiance to 20 mg daily   Nusrat Walker RN on 4/25/2023 at 3:43 PM

## 2023-04-25 NOTE — H&P
Pre-Operative H & P     CC:  Preoperative exam to assess for increased cardiopulmonary risk while undergoing surgery and anesthesia.    Date of Encounter: 4/25/2023  Primary Care Physician:  Kash Solano     Reason for visit: IUD check up; Abnormal CT scan, pelvis; Intramural leiomyoma of uterus      HPI  Janine Cornell is a 57 year old female who presents for pre-operative H & P in preparation for  Procedure Information     Case: [id number] Date/Time: 04/28/23 1400    Procedures:       EXAM UNDER ANESTHESIA (Vagina)      Remove intrauterine device, (Uterus)      ENDOMETRIAL BIOPSY (Uterus)      , POSSIBLE HYSTEROSCOPY, DIAGNOSTIC (Uterus)    Anesthesia type: MAC with Local    Diagnosis:       IUD check up [Z30.431]      Abnormal CT scan, pelvis [R93.5]      Intramural leiomyoma of uterus [D25.1]    Pre-op diagnosis:       IUD check up [Z30.431]      Abnormal CT scan, pelvis [R93.5]      Intramural leiomyoma of uterus [D25.1]    [facility]: [facility]    Providers: Nahed Butler MD          Patient is being evaluated for comorbid conditions of CAD, h/o NSTEMI, s/p coronary stenting in 2011 & 2016, HTN, HLD, ICM, asthma, allergic rhinitis, tobacco dependence, migraines, neuropathy, Granulomatosis with polyangiitis (GPA) s/p R lateral orbitotomy, DM2, hypothyroidism, iron deficiency, GERD, chronic pancreatitis, fibromyalgia, rheumatoid arthritis, PCOS, chronic anticoagulation.      Ms. Cornell has a long history of upper abdominal pain and for this had a CT abdomen and pelvis on 2/10/23.  Notable findings include fibroid uterus with IUD at the fundus, but questionable embedment of the IUD.  This IUD was placed in the OR under ultrasound guidance in July of 2016.  She has not had any regular bleeding since but does have random spotting.   Patient is anxious about trial of IUD removal in the office.  She has not tolerated pelvic exams in past and concerned it may be difficult to remove. She now presents for  the above procedure.      History was obtained from patient & chart review.      Hx of abnormal bleeding or anti-platelet use: on Plavix    Menstrual history: No LMP recorded (lmp unknown). (Menstrual status: IUD).:       Past Medical History  Past Medical History:   Diagnosis Date     Abnormal glandular Papanicolaou smear of cervix 1992     Allergic rhinitis     Allergy, airborne subst     Arthritis      ASCVD (arteriosclerotic cardiovascular disease)      Chronic pain      Chronic pancreatitis (H)     idiopathic, Tx: PPI, antioxidants, pancreatic enzymes     Common migraine      Coronary artery disease      Costochondritis      Difficulty in walking(719.7)      Dyspnea on exertion      Ectasia, mammary duct     [facility], persistent nipple discharge     Elevated fasting glucose      Gastro-oesophageal reflux disease      Granulomatosis with polyangiitis (H)      Hernia      History of angina      Hyperlipidemia LDL goal < 100      Hypertension goal BP (blood pressure) < 140/90     Essential hypertension     Iron deficiency anemia      Ischemic cardiomyopathy      Menorrhagia      Migraine headaches      Mild persistent asthma      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 Matias, neg w/u, Rx'd w/prednisone     Ventral hernia, unspecified, without  mention of obstruction or gangrene 2012       Past Surgical History  Past Surgical History:   Procedure Laterality Date     C/SECTION, LOW TRANSVERSE  1996         CARDIAC SURGERY      cardiac stent- recent in 16; 4 other stents     DILATION AND CURETTAGE N/A 2016    Procedure: DILATION AND CURETTAGE;  Surgeon: Nahed Butler MD;  [facility]     ESOPHAGOSCOPY, GASTROSCOPY, DUODENOSCOPY (EGD), COMBINED N/A 2022    Procedure: ESOPHAGOGASTRODUODENOSCOPY, WITH BIOPSY;  Surgeon: Enzo Sesay MD;  [facility]     HC UGI ENDOSCOPY W EUS  2008    Dr. Pastrana, possible chronic pancreatitis, fatty liver     [place] [facility] [place] INTRAUTERINE DEVICE N/A 2016    Procedure: INSERT INTRAUTERINE DEVICE;  Surgeon: Nahed Butler MD;  [facility]     INT UTERINE FIBRIOD EMBOLIZATION  10/29/2014     LAPAROSCOPIC CHOLECYSTECTOMY  2008    Dr. Arnol GRUBBS TX, CERVICAL  1992    s/p LEERAHUL, done at [facility] in [place].     ORBITOTOMY Right 03/15/2016    Procedure: ORBITOTOMY;  Surgeon: Myron Cyr MD;  Location: [facility]     ORBITOTOMY Right 2017    Procedure: ORBITOTOMY;  RIGHT ORBITOTOMY AND BIOPSY;  Surgeon: Charis Holbrook MD;  Location: [facility]     REPAIR PTOSIS Right 2017    Procedure: REPAIR PTOSIS;  RIGHT UPPER LID PTOSIS REPAIR;  Surgeon: Myron Cyr MD;  Location: [facility]     UPPER GI ENDOSCOPY  10/21/2008    mild gastritis, Dr. Rocky [facility] ECHO HEART XTHORACIC,COMPLETE, W/O DOPPLER  2004    [facility] WNL, EF 60%       Prior to Admission Medications  Current Outpatient Medications   Medication Sig Dispense Refill     acetaminophen (TYLENOL) 325 MG tablet Take 1-2 tablets (325-650 mg) by mouth every 6 hours as needed for pain (headache) 250 tablet 4     ADVAIR DISKUS 250-50 MCG/ACT inhaler Inhale 1 puff into the lungs 2 times daily       albuterol (2.5  MG/3ML) 0.083% neb solution INL 1 VIAL VIA NEBULIZATION Q 4 TO 6 HOURS PRN  1     albuterol (PROAIR HFA, PROVENTIL HFA, VENTOLIN HFA) 108 (90 BASE) MCG/ACT inhaler Inhale 2 puffs into the lungs every 6 hours as needed for shortness of breath / dyspnea or wheezing 3 Inhaler 1     BANOPHEN 25 MG tablet Take 25 mg by mouth At Bedtime       calcium carbonate (TUMS) 500 MG chewable tablet Take 3-4 tablets (1,500-2,000 mg) by mouth daily as needed 90 tablet 3     clopidogrel (PLAVIX) 75 MG tablet Take 1 tablet (75 mg) by mouth daily . 30-day refills only. (Patient taking differently: Take 75 mg by mouth At Bedtime . 30-day refills only.) 30 tablet 11     cyclobenzaprine (FLEXERIL) 10 MG tablet Take 1 tablet (10 mg) by mouth 2 times daily as needed for muscle spasms 60 tablet 3     empagliflozin (JARDIANCE) 10 MG TABS tablet Take 2 tablets (20 mg) by mouth daily (Patient taking differently: Take 20 mg by mouth At Bedtime) 90 tablet 3     EPIPEN 2-RIKY 0.3 MG/0.3ML injection INJECT 0.3 MG INTO THE MUSCLE PRF ANAPHYALAXIS  0     esomeprazole (NEXIUM) 40 MG DR capsule Take 1 capsule (40 mg) by mouth 2 times daily Take 30-60 minutes before eating. (Patient taking differently: Take 40 mg by mouth as needed Take 30-60 minutes before eating.) 180 capsule 0     estradiol (VAGIFEM) 10 MCG TABS vaginal tablet Place 1 tablet (10 mcg) vaginally twice a week 24 tablet 3     fluticasone (FLONASE) 50 MCG/ACT nasal spray Spray 1 spray in nostril as needed       folic acid (FOLVITE) 1 MG tablet Take 1 tablet (1 mg) by mouth daily (Patient taking differently: Take 1 mg by mouth At Bedtime) 90 tablet 3     hydrocortisone (CORTAID) 1 % external cream Apply topically 2 times daily (Patient taking differently: Apply topically as needed) 60 g 1     insulin glargine (LANTUS PEN) 100 UNIT/ML pen Inject 65 Units Subcutaneous every morning Or as directed. 75 mL 3     levocetirizine (XYZAL) 5 MG tablet Take 5 mg by mouth as needed       levonorgestrel  (MIRENA) 20 MCG/DAY IUD 1 each by Intrauterine route once       lisinopril-hydrochlorothiazide (ZESTORETIC) 20-25 MG tablet Take 1 tablet by mouth daily . 30-day refills only. (Patient taking differently: Take 1 tablet by mouth At Bedtime . 30-day refills only.) 30 tablet 11     magnesium 250 MG tablet TAKE 1 TABLET(250 MG) BY MOUTH TWICE DAILY (Patient taking differently: Take 1 tablet by mouth At Bedtime) 60 tablet 3     metFORMIN (GLUCOPHAGE XR) 500 MG 24 hr tablet Take 4 tablets (2,000 mg) by mouth daily (with dinner) (Patient taking differently: Take 2,000 mg by mouth At Bedtime) 360 tablet 3     metoprolol succinate ER (TOPROL XL) 100 MG 24 hr tablet Take 1 tablet (100 mg) by mouth daily . 30-day refills only. (Patient taking differently: Take 100 mg by mouth At Bedtime . 30-day refills only.) 30 tablet 11     Multiple Vitamin (TAB-A-GERALD) TABS Take 1 tablet by mouth daily (Patient taking differently: Take 1 tablet by mouth At Bedtime) 90 tablet 1     nitroGLYcerin (NITROSTAT) 0.4 MG sublingual tablet Place 1 tablet (0.4 mg) under the tongue every 5 minutes as needed for chest pain . After 3 doses, if pain persists call 911. 25 tablet 3     olopatadine (PATANOL) 0.1 % ophthalmic solution Place 1 drop into both eyes as needed       pravastatin (PRAVACHOL) 40 MG tablet Take 1 tablet (40 mg) by mouth daily . 30-day refills only. (Patient taking differently: Take 40 mg by mouth At Bedtime . 30-day refills only.) 30 tablet 11     prochlorperazine (COMPAZINE) 5 MG tablet Take 1 tablet (5 mg) by mouth every 6 hours as needed for nausea or vomiting 60 tablet 3     sennosides (SENOKOT) 8.6 MG tablet 1-2 tabs a day as needed for constipation 60 tablet 1     spironolactone (ALDACTONE) 25 MG tablet Take 1 tablet (25 mg) by mouth daily . Take one additional 0.5 tab daily as needed for weight gain. 45 day refills. (Patient taking differently: Take 25 mg by mouth At Bedtime . Take one additional 0.5 tab daily as needed for  weight gain. 45 day refills.) 45 tablet 11     sucralfate (CARAFATE) 1 GM tablet Take 1 tablet (1 g) by mouth 4 times daily (Patient taking differently: Take 1 g by mouth 4 times daily as needed) 120 tablet 3     terbinafine (LAMISIL) 1 % external cream Apply topically 2 times daily (Patient taking differently: Apply topically as needed) 42 g 1     traMADol (ULTRAM) 50 MG tablet TAKE 1 TABLET(50 MG) BY MOUTH EVERY 8 HOURS AS NEEDED FOR SEVERE PAIN 60 tablet 3     vitamin D2 (ERGOCALCIFEROL) 49037 units (1250 mcg) capsule Take 1 capsule (50,000 Units) by mouth every 7 days (Patient taking differently: Take 50,000 Units by mouth every 7 days Sunday) 4 capsule 0     blood glucose (NO BRAND SPECIFIED) lancets standard Use to test blood sugar 1-4 times daily or as directed. 400 each 3     blood glucose (NO BRAND SPECIFIED) test strip Use to test blood sugar 1-4 times daily or as directed. 400 strip 3     Blood Pressure Monitor KIT 1 each daily Monitor home blood pressure as instructed by physician.  Dispense [place] blood pressure kit. 1 kit 0     COMPRESSION STOCKINGS 2 each daily Apply one 10-15 mmHg compression stocking to each lower extgmierty during the day and remove before bedtime. 4 each 2     Continuous Blood Gluc Sensor (FREESTYLE TO 2 SENSOR) MISC Inject 1 each Subcutaneous every 14 days Use 1 sensor every 14 days. Use to read blood sugars per 's instructions. 2 each 5     dicyclomine (BENTYL) 20 MG tablet TAKE 1 TABLET(20 MG) BY MOUTH FOUR TIMES DAILY AS NEEDED. (Patient not taking: Reported on 4/25/2023) 60 tablet 0     diphenhydrAMINE (BENADRYL) 25 MG capsule Take 1 capsule (25 mg) by mouth nightly as needed for itching or allergies 30 capsule 2     famotidine (PEPCID) 20 MG tablet Take 1 tablet (20 mg) by mouth 2 times daily as needed (abdominal pain) (Patient not taking: Reported on 4/25/2023) 20 tablet 0     fexofenadine (ALLEGRA) 180 MG tablet Take 1 tablet by mouth daily as needed.  (Patient not taking: Reported on 2023) 90 tablet 3     fluconazole (DIFLUCAN) 200 MG tablet Take 1 tablet (200 mg) by mouth daily (Patient not taking: Reported on 2023) 7 tablet 0     insulin pen needle (BD PEN NEEDLE MARCK 2ND GEN) 32G X 4 MM miscellaneous Use one pen needle daily or as directed. 100 each 3     ketoconazole (NIZORAL) 2 % shampoo Apply topically daily as needed (Patient not taking: Reported on 2023)         Allergies  Allergies   Allergen Reactions     Amoxicillin-Pot Clavulanate      Unknown possible hives and edema     Amoxicillin-Pot Clavulanate      Artificial Sweetner (Informational Only)  Other (See Comments)     Increased headache     Azithromycin      Clavulanic Acid      Diatrizoate Other (See Comments)     Pt wants this listed because she is allergic to shellfish      Imitrex [Triptans]      Severe face/neck/chest tightness and flushing side effects      Penicillins Hives     Unknown      Pork Allergy      Stomach pain, cramping, diarrhea, itching, nausea and headaches     Shellfish Allergy Hives and Swelling     Shellfish-Derived Products      Sulfa Antibiotics Hives and Swelling     Sulfatolamide      Zithromax [Azithromycin Dihydrate] Swelling     Unknown        Social History  Social History     Socioeconomic History     Marital status: Single     Spouse name: Not on file     Number of children: 1     Years of education: Not on file     Highest education level: Not on file   Occupational History     Employer: NONE    Tobacco Use     Smoking status: Former     Packs/day: 0.20     Years: 1.00     Pack years: 0.20     Types: Cigarettes     Quit date: 2016     Years since quittin.2     Smokeless tobacco: Never   Vaping Use     Vaping status: Not on file   Substance and Sexual Activity     Alcohol use: No     Alcohol/week: 0.0 standard drinks of alcohol     Drug use: No     Sexual activity: Not Currently   Other Topics Concern     Parent/sibling w/ CABG, MI or  angioplasty before 65F 55M? Yes   Social History Narrative    Balanced Diet - sometimes    Osteoporosis Prevention Measures - Dairy servings per day: 2 servings weekly    Regular Exercise -  Yes Describe walking 4 times a week    Dental Exam - NO    Seatbelts used - Yes    Self Breast Exam - Yes    Abuse: Current or Past (Physical, Sexual or Emotional)- No    Do you have any concerns about STD's -  No    Do you feel safe in your environment - No    Guns stored in the home - No    Sunscreen used - Yes    Lipids -  YES - Date:     Glucose -  YES - Date:     Eye Exam - YES - Date: one year ago    Colon Cancer Screening - No    Hemoccults - NO    Pap Test -  YES - Date: , remote history of LEEP    Mammography - YES - Date: last spring, would like to discuss, needs a referral to [facility] [place]    DEXA - NO    Immunizations reviewed and up to date - Yes, last td given in  or      Social Determinants of Health     Financial Resource Strain: Not on file   Food Insecurity: Not on file   Transportation Needs: Not on file   Physical Activity: Not on file   Stress: Not on file   Social Connections: Not on file   Intimate Partner Violence: Not on file   Housing Stability: Not on file       Family History  Family History   Problem Relation Age of Onset     Hypertension Mother      Arthritis Mother      Heart Disease Mother         long qt syndrome     Thyroid Disease Mother      C.A.D. Mother      Heart Disease Father 50        heart attack     Cerebrovascular Disease Father      Diabetes Father      Hypertension Father      Depression Father      C.A.D. Father      Neurologic Disorder Sister         migraines     Neurologic Disorder Sister         migraines     Heart Disease Brother 15        MI at 15, 16.      Arthritis Brother         he passed away, had severe arthritis at age 11     C.A.D. Brother      Anesthesia Reaction Son         PONV     Respiratory Son         asthma      "Hypertension Maternal Aunt      Diabetes Maternal Uncle      Hypertension Maternal Uncle      Arthritis Maternal Uncle      Eye Disorder Maternal Uncle         cataracts     Cerebrovascular Disease Maternal Uncle      Family History Negative Other         neg for RA, SLE     Unknown/Adopted No family hx of         unknown neurological issues in her family, mother was adopted     Skin Cancer No family hx of         No known family hx of skin cancer     Deep Vein Thrombosis (DVT) No family hx of        Review of Systems  The complete review of systems is negative other than noted in the HPI or here.     Anesthesia Evaluation   Pt has had prior anesthetic.     History of anesthetic complications  - PONV.      ROS/MED HX  ENT/Pulmonary: Comment: Uses Advair daily. Last used albuterol one week ago.    (+) allergic rhinitis, tobacco use (smokes \"off & on\"), Intermittent, asthma Treatment: Inhaler daily,   (-) sleep apnea   Neurologic: Comment: Granulomatosis with polyangiitis (GPA) s/p R lateral orbitotomy    (+) peripheral neuropathy, - hands & feet, legs & arms. migraines,     Cardiovascular: Comment: Last seen by cardiology 7/7/22; stable.      (+) hypertension--CAD -past MI -stent-2011 & 2016. 3 Drug Eluting Stent. Taking blood thinners Instructions Given to patient: on Plavix. ROBERTS. Previous cardiac testing   Echo: Date: 5/25/21 Results:  Global and regional left ventricular function is normal with an EF of 60-65%.   Global right ventricular function is normal.   No significant valvular abnormalities present.   The inferior vena cava was normal in size with preserved respiratory variability.   No pericardial effusion is present.    Stress Test:  Date: 2017 Results:  Normal exercise echocardiogram without evidence of inducible ischemia.    Target heart rate was achieved. Heart rate and blood pressure response to exercise were normal. With stress, the left ventricular ejection fraction increased from 55-60% to greater " "than 65% and the left ventricular size decreased appropriately.    Normal functional capacity. No subjective symptoms to suggest ischemia.    There was no ECG evidence of ischemia.    No significant valve disease on screening doppler evaluation. The aortic root and visualized ascending aorta are normal.    ECG Reviewed:  Date: Results:    Cath:  Date: Results:      METS/Exercise Tolerance: 1 - Eating, dressing Comment: Able to walk one block. Endorses SOB, particularly with humid or cold weather. Diffuse joint pain, fatigue. Long-standing symptoms.   Hematologic:       Musculoskeletal: Comment: Rheumatoid arthritis, receives Rituximab Q6 months (last infusion 3/2023)    Fibromyalgia        GI/Hepatic: Comment: Chronic pancreatitis    GERD symptoms fluctuate, despite using Pepcid & Nexium. Has been mild for several months now.    (+) GERD,  (-) liver disease   Renal/Genitourinary:  - neg Renal ROS  (-) renal disease   Endo: Comment: PCOS      (+) type II DM, Last HgA1c: 8.5, date: 11/23/22, Using insulin, thyroid problem,  multinodular goiter, Obesity,     Psychiatric/Substance Use:  - neg psychiatric ROS     Infectious Disease:  - neg infectious disease ROS     Malignancy:  - neg malignancy ROS     Other:  - neg other ROS          /83 (BP Location: Right arm, Patient Position: Sitting, Cuff Size: Adult Large)   Pulse 72   Temp 98.3  F (36.8  C) (Oral)   Resp 16   Ht 1.575 m (5' 2\")   Wt 78.7 kg (173 lb 9.6 oz)   LMP  (LMP Unknown)   SpO2 99%   Breastfeeding No   BMI 31.75 kg/m      Physical Exam  Constitutional: Awake, alert, cooperative, no apparent distress, and appears stated age.  Eyes: Pupils equal, round and reactive to light, extra ocular muscles intact, sclera clear, conjunctiva normal.  HENT: Normocephalic, oral pharynx with moist mucus membranes, good dentition. No goiter appreciated. No removable dental hardware.  Respiratory: Clear to auscultation bilaterally, no crackles or wheezing. No " SOB when supine.  Cardiovascular: Regular rate and rhythm, normal S1 and S2, and no murmur noted.  Carotids +2, no bruits. No edema. Palpable pulses to radial, DP and PT arteries.   GI: Normal bowel sounds, soft, non-distended, non-tender, no masses palpated.    Lymph/Hematologic: No cervical lymphadenopathy and no supraclavicular lymphadenopathy.  Genitourinary:  deferred  Skin: Warm and dry.  No rashes.   Musculoskeletal: Limited extension ROM of neck. There is no redness, warmth, or swelling of the joints. Gross motor strength is diminished in LEs.    Neurologic: Awake, alert, oriented to name, place and time. Cranial nerves II-XII are grossly intact. Gait is normal. Ambulates from chair to exam table, seats self, lies supine and sits back up w/o assistance.  Neuropsychiatric: Calm, cooperative. Normal affect. Pleasant. Answers questions appropriately, follows commands w/o difficulty.        PRIOR LABS/DIAGNOSTIC STUDIES:    All labs and imaging personally reviewed        Stress test -  please see in ROS above     Echo result w/o MOPS 5/25/21: Interpretation SummaryGlobal and regional left ventricular function is normal with an EF of 60-65%.Global right ventricular function is normal.No significant valvular abnormalities present.The inferior vena cava was normal in size with preserved respiratoryvariability.No pericardial effusion is present.       The patient's records and results personally reviewed by this provider.       LABS signed/ held by Dr. Butler:  T&S, Hgb    Assessment      Janine Cornell is a 57 year old female seen as a PAC referral for risk assessment and optimization for anesthesia.    Plan/Recommendations  Pt will be optimized for the proposed procedure.  See below for details on the assessment, risk, and preoperative recommendations    NEUROLOGY  - No history of TIA, CVA or seizure  - Granulomatosis with polyangiitis (GPA) s/p R lateral orbitotomy    -Post Op delirium risk factors:  No risk  "identified    ENT  - No current airway concerns.  Will need to be reassessed day of surgery.  Mallampati: III  TM: > 3   - Decreased extension ROM of neck due to pain/ arthritis    CARDIAC  - CAD, s/p coronary stenting in 2011 & 2016  - Last seen by cardiology 7/7/22; stable, no additional testing indicated.  - HTN, will hold lisinopril-hydrochlorothiazide & spironolactone DOS  - Echo 5/25/21: EF 60-65%, normal function  - Stress test 2017: no ischemia    - METS (Metabolic Equivalents)  Able to walk one block. Endorses SOB, particularly with humid or cold weather. Diffuse joint pain, fatigue. Long-standing symptoms.    Patient CANNOT perform 4 METS exercise without symptoms            Total Score: 1    Functional Capacity: Unable to complete 4 METS      RCRI-Moderate risk: Class 3  6.6% complication rate            Total Score: 2    RCRI: CAD    RCRI: Diabetes        PULMONARY  BRENNA Low Risk            Total Score: 2    BRENNA: Hypertension    BRENNA: Over 50 ys old      - Asthma, Uses Advair daily. Last used albuterol one week ago.    - Tobacco History      History   Smoking Status     Former     Packs/day: 0.20     Years: 1.00     Types: Cigarettes     Quit date: 2/1/2016   Smokeless Tobacco     Never       GI  - GERD symptoms fluctuate, despite using Pepcid & Nexium. Has been mild for several months now.  PONV High Risk  Total Score: 4           1 AN PONV: Pt is Female    1 AN PONV: Patient is not a current smoker    1 AN PONV: Patient has history of PONV    1 AN PONV: Intended Post Op Opioids      - Hx pancreatitis      ENDOCRINE    - BMI: Estimated body mass index is 31.75 kg/m  as calculated from the following:    Height as of this encounter: 1.575 m (5' 2\").    Weight as of this encounter: 78.7 kg (173 lb 9.6 oz).  Overweight (BMI 25.0-29.9)  - IDDM, A1c on 11/23/22 was 8.5  - Hold morning oral hypoglycemic medications and short acting insulin DOS. Take 80% of last scheduled dose of long-acting insulin prior to " procedure.  Recommend close monitoring of the patient's blood glucose levels throughout the perioperative period.  - PCOS      HEME  VTE Low Risk 0.26%            Total Score: 0      - On Plavix, last dose 4/24      MSK  - Rheumatoid arthritis, receives Rituximab Q6 months (last infusion 3/2023)  - Fibromyalgia      The patient is aware that the final anesthesia plan will be decided by the assigned anesthesia provider on the date of service.    The patient is optimized for their procedure. AVS with information on surgery time/arrival time, meds and NPO status given by nursing staff. No further diagnostic testing indicated.      On the day of service:     Prep time: 16 minutes  Visit time: 26 minutes  Documentation time: 15 minutes  ------------------------------------------  Total time: 57 minutes      Maria Luisa Mendoza PA-C  Preoperative Assessment Center  [facility]  [facility] Center  Phone: [phone]  Fax: [phone]

## 2023-04-25 NOTE — TELEPHONE ENCOUNTER
Patient states that she does not email and does not want myChart.  Also would like a call back.  She states that she just saw provider a few weeks ago and is unsure of why info is again requested.

## 2023-04-25 NOTE — PATIENT INSTRUCTIONS
Preparing for Your Surgery      Name:  Janine Cornell   MRN:  [id number]   :  1965   Today's Date:  2023       Arriving for surgery:  Surgery date:  23  Arrival time:  12:00PM    Please come to:     [facility] [address]  [address] [place]  [address]    - parking is available in front of [facility] from 5:15AM to 8:00PM. If you prefer, park your car in the Green Lot.    -Proceed to the 3rd floor, check in at the Adult Surgery Waiting Lounge. [phone]    If an escort is needed stop at the Information Desk in the lobby. Inform the information person that you are here for surgery. An escort to the Adult Surgery Waiting Lounge will be provided.    What can I eat or drink?  -  You may eat and drink normally up to 8 hours prior to arrival time. (Until 23, 4AM)  -  You may have clear liquids until 2 hours prior to arrival time. (Until 23, 10AM)    Examples of clear liquids:  Water  Clear broth  Juices (apple, white grape, white cranberry  and cider) without pulp  Noncarbonated, powder based beverages  (lemonade and Mayo-Aid)  Sodas (Sprite, 7-Up, ginger ale and seltzer)  Coffee or tea (without milk or cream)  Gatorade    -  No Alcohol for at least 24 hours before surgery.     Which medicines can I take?    Hold Aspirin for 7 days before surgery.   Hold Multivitamins for 7 days before surgery.  Hold Supplements for 7 days before surgery.  Hold Ibuprofen (Advil, Motrin) for 1 day(s) before surgery--unless otherwise directed by surgeon.  Hold Naproxen (Aleve) for 4 days before surgery.  Hold Clopidogrel(Plavix) for 3 days prior to surgery, last dose 23.   Hold Jardiance for 3 days prior to surgery, last dose 23.     -  DO NOT take these medications the day of surgery:    TUMS    Estradiol vaginal tablet    Folic  Acid    Levocetirizine(Xyzal)    Senokot    Spironolactone(Aldactone)    Sucralfate(Carafate)        -  PLEASE TAKE these medications the day of surgery:    Take evening medications as usual the evening prior to surgery.     Please take 52 Units of Lantus Insulin the morning of surgery.     Acetaminophen(Tylenol) as needed    Advair Diskus Inhaler    Albuterol Inhaler as needed and bring the day of surgery    Cyclobenzaprine(Flexeril) as needed    Esomeprazole(Nexium)    Flonase Nasal spray as needed    Patanol eye drops    Prochlorperazine(Compazine) as needed    Tramadol(Ultram) as needed        How do I prepare myself?  - Please take 2 showers (one the night prior to surgery and one the morning of surgery) using Scrubcare or Hibiclens soap.    Use this soap only from the neck to your toes.     Leave the soap on your skin for one minute--then rinse thoroughly.      You may use your own shampoo and conditioner. No other hair products.   - Please remove all jewelry and body piercings.  - No lotions, deodorants or fragrance.  - No makeup or fingernail polish.   - Bring your ID and insurance card.    -If you have a Deep Brain Stimulator, Spinal Cord Stimulator, or any Neuro Stimulator device---you must bring the remote control to the hospital.      ALL PATIENTS GOING HOME THE SAME DAY OF SURGERY ARE REQUIRED TO HAVE A RESPONSIBLE ADULT TO DRIVE AND BE IN ATTENDANCE WITH THEM FOR 24 HOURS FOLLOWING SURGERY.    Covid testing policy as of 12/06/2022  Your surgeon will notify and schedule you for a COVID test if one is needed before surgery--please direct any questions or COVID symptoms to your surgeon      Questions or Concerns:    - For any questions regarding the day of surgery or your hospital stay, please contact the Pre Admission Nursing Office at [phone].       - If you have health changes between today and your surgery, please call your surgeon.       - For questions after surgery, please call your surgeons  office.           Current Visitor Guidelines       Surgeries and procedures: Adult patients can have 2 visitors all through the surgery process.     Visiting hours: 8 a.m. to 8:30 p.m.     Hospital: Adult patients and children under age 18 can have 4 visitor at a time       No visitors under the age of 5 are allowed for hospital patients.       Double occupancy rooms: Patients can have only two visitors at a time.       Patients confirmed or suspected to have symptoms of COVID 19 or flu:     No visitors allowed for adult patients.   Children (under age 18) can have 1 named visitor.     People who are sick or showing symptoms of COVID 19 or flu:    Are not allowed to visit patients--we can only make exceptions in special situations.       Please follow these guidelines for your visit:          Please maintain social distance          Masking is optional--however at times you may be asked to wear a mask for the safety of yourself and others     Clean your hands with alcohol hand . Do this when you arrive at and leave the building and patient room,    And again after you touch your mask or anything in the room.     Go directly to and from the room you are visiting.     Stay in the patient s room during your visit. Limit going to other places in the hospital as much as possible     Leave bags and jackets at home or in the car.     For everyone s health, please don t come and go during your visit. That includes for smoking   during your visit.

## 2023-04-28 ENCOUNTER — HOSPITAL ENCOUNTER (OUTPATIENT)
Facility: CLINIC | Age: 58
Discharge: HOME | End: 2023-04-28
Attending: OBSTETRICS & GYNECOLOGY | Admitting: OBSTETRICS & GYNECOLOGY
Payer: COMMERCIAL

## 2023-04-28 ENCOUNTER — ANESTHESIA (OUTPATIENT)
Dept: SURGERY | Facility: CLINIC | Age: 58
End: 2023-04-28
Payer: COMMERCIAL

## 2023-04-28 VITALS
HEART RATE: 62 BPM | TEMPERATURE: 98.2 F | SYSTOLIC BLOOD PRESSURE: 146 MMHG | DIASTOLIC BLOOD PRESSURE: 87 MMHG | WEIGHT: 171.96 LBS | BODY MASS INDEX: 31.64 KG/M2 | RESPIRATION RATE: 16 BRPM | OXYGEN SATURATION: 100 % | HEIGHT: 62 IN

## 2023-04-28 DIAGNOSIS — R93.5 ABNORMAL CT SCAN, PELVIS: ICD-10-CM

## 2023-04-28 DIAGNOSIS — D25.1 INTRAMURAL LEIOMYOMA OF UTERUS: ICD-10-CM

## 2023-04-28 DIAGNOSIS — Z30.431 IUD CHECK UP: ICD-10-CM

## 2023-04-28 LAB
ABO/RH(D): NORMAL
ANTIBODY SCREEN: NEGATIVE
GLUCOSE BLDC GLUCOMTR-MCNC: 108 MG/DL (ref 70–99)
GLUCOSE BLDC GLUCOMTR-MCNC: 86 MG/DL (ref 70–99)
HGB BLD-MCNC: 12.9 G/DL (ref 11.7–15.7)
SPECIMEN EXPIRATION DATE: NORMAL

## 2023-04-28 PROCEDURE — 88305 TISSUE EXAM BY PATHOLOGIST: CPT | Mod: 26 | Performed by: PATHOLOGY

## 2023-04-28 PROCEDURE — 86901 BLOOD TYPING SEROLOGIC RH(D): CPT | Performed by: OBSTETRICS & GYNECOLOGY

## 2023-04-28 PROCEDURE — 85018 HEMOGLOBIN: CPT | Performed by: OBSTETRICS & GYNECOLOGY

## 2023-04-28 PROCEDURE — 999N000141 HC STATISTIC PRE-PROCEDURE NURSING ASSESSMENT: Performed by: OBSTETRICS & GYNECOLOGY

## 2023-04-28 PROCEDURE — 710N000012 HC RECOVERY PHASE 2, PER MINUTE: Performed by: OBSTETRICS & GYNECOLOGY

## 2023-04-28 PROCEDURE — 258N000003 HC RX IP 258 OP 636: Performed by: NURSE ANESTHETIST, CERTIFIED REGISTERED

## 2023-04-28 PROCEDURE — 250N000011 HC RX IP 250 OP 636: Performed by: NURSE ANESTHETIST, CERTIFIED REGISTERED

## 2023-04-28 PROCEDURE — 86850 RBC ANTIBODY SCREEN: CPT | Performed by: OBSTETRICS & GYNECOLOGY

## 2023-04-28 PROCEDURE — 58301 REMOVE INTRAUTERINE DEVICE: CPT | Performed by: OBSTETRICS & GYNECOLOGY

## 2023-04-28 PROCEDURE — 272N000001 HC OR GENERAL SUPPLY STERILE: Performed by: OBSTETRICS & GYNECOLOGY

## 2023-04-28 PROCEDURE — 360N000075 HC SURGERY LEVEL 2, PER MIN: Performed by: OBSTETRICS & GYNECOLOGY

## 2023-04-28 PROCEDURE — 250N000009 HC RX 250: Performed by: NURSE ANESTHETIST, CERTIFIED REGISTERED

## 2023-04-28 PROCEDURE — 82962 GLUCOSE BLOOD TEST: CPT

## 2023-04-28 PROCEDURE — 250N000009 HC RX 250: Performed by: OBSTETRICS & GYNECOLOGY

## 2023-04-28 PROCEDURE — 58100 BIOPSY OF UTERUS LINING: CPT | Performed by: OBSTETRICS & GYNECOLOGY

## 2023-04-28 PROCEDURE — 370N000017 HC ANESTHESIA TECHNICAL FEE, PER MIN: Performed by: OBSTETRICS & GYNECOLOGY

## 2023-04-28 PROCEDURE — 250N000011 HC RX IP 250 OP 636: Performed by: ANESTHESIOLOGY

## 2023-04-28 PROCEDURE — 88305 TISSUE EXAM BY PATHOLOGIST: CPT | Mod: TC | Performed by: OBSTETRICS & GYNECOLOGY

## 2023-04-28 RX ORDER — FENTANYL CITRATE 50 UG/ML
25 INJECTION, SOLUTION INTRAMUSCULAR; INTRAVENOUS EVERY 5 MIN PRN
Status: DISCONTINUED | OUTPATIENT
Start: 2023-04-28 | End: 2023-04-28 | Stop reason: HOSPADM

## 2023-04-28 RX ORDER — KETOROLAC TROMETHAMINE 30 MG/ML
INJECTION, SOLUTION INTRAMUSCULAR; INTRAVENOUS PRN
Status: DISCONTINUED | OUTPATIENT
Start: 2023-04-28 | End: 2023-04-28

## 2023-04-28 RX ORDER — HYDROMORPHONE HYDROCHLORIDE 1 MG/ML
0.4 INJECTION, SOLUTION INTRAMUSCULAR; INTRAVENOUS; SUBCUTANEOUS EVERY 5 MIN PRN
Status: DISCONTINUED | OUTPATIENT
Start: 2023-04-28 | End: 2023-04-28 | Stop reason: HOSPADM

## 2023-04-28 RX ORDER — ACETAMINOPHEN 325 MG/1
975 TABLET ORAL ONCE
Status: DISCONTINUED | OUTPATIENT
Start: 2023-04-28 | End: 2023-04-28 | Stop reason: HOSPADM

## 2023-04-28 RX ORDER — SODIUM CHLORIDE, SODIUM LACTATE, POTASSIUM CHLORIDE, CALCIUM CHLORIDE 600; 310; 30; 20 MG/100ML; MG/100ML; MG/100ML; MG/100ML
INJECTION, SOLUTION INTRAVENOUS CONTINUOUS PRN
Status: DISCONTINUED | OUTPATIENT
Start: 2023-04-28 | End: 2023-04-28

## 2023-04-28 RX ORDER — DIPHENHYDRAMINE HCL 25 MG
25 CAPSULE ORAL EVERY 6 HOURS PRN
Status: DISCONTINUED | OUTPATIENT
Start: 2023-04-28 | End: 2023-04-28 | Stop reason: HOSPADM

## 2023-04-28 RX ORDER — ONDANSETRON 2 MG/ML
4 INJECTION INTRAMUSCULAR; INTRAVENOUS EVERY 30 MIN PRN
Status: DISCONTINUED | OUTPATIENT
Start: 2023-04-28 | End: 2023-04-28 | Stop reason: HOSPADM

## 2023-04-28 RX ORDER — ONDANSETRON 2 MG/ML
INJECTION INTRAMUSCULAR; INTRAVENOUS PRN
Status: DISCONTINUED | OUTPATIENT
Start: 2023-04-28 | End: 2023-04-28

## 2023-04-28 RX ORDER — DIMENHYDRINATE 50 MG/ML
25 INJECTION, SOLUTION INTRAMUSCULAR; INTRAVENOUS
Status: DISCONTINUED | OUTPATIENT
Start: 2023-04-28 | End: 2023-04-28 | Stop reason: HOSPADM

## 2023-04-28 RX ORDER — LABETALOL HYDROCHLORIDE 5 MG/ML
10 INJECTION, SOLUTION INTRAVENOUS
Status: DISCONTINUED | OUTPATIENT
Start: 2023-04-28 | End: 2023-04-28 | Stop reason: HOSPADM

## 2023-04-28 RX ORDER — IBUPROFEN 800 MG/1
800 TABLET, FILM COATED ORAL ONCE
Status: DISCONTINUED | OUTPATIENT
Start: 2023-04-28 | End: 2023-04-28 | Stop reason: HOSPADM

## 2023-04-28 RX ORDER — SODIUM CHLORIDE, SODIUM LACTATE, POTASSIUM CHLORIDE, CALCIUM CHLORIDE 600; 310; 30; 20 MG/100ML; MG/100ML; MG/100ML; MG/100ML
INJECTION, SOLUTION INTRAVENOUS CONTINUOUS
Status: DISCONTINUED | OUTPATIENT
Start: 2023-04-28 | End: 2023-04-28 | Stop reason: HOSPADM

## 2023-04-28 RX ORDER — DEXAMETHASONE SODIUM PHOSPHATE 4 MG/ML
4 INJECTION, SOLUTION INTRA-ARTICULAR; INTRALESIONAL; INTRAMUSCULAR; INTRAVENOUS; SOFT TISSUE
Status: DISCONTINUED | OUTPATIENT
Start: 2023-04-28 | End: 2023-04-28 | Stop reason: HOSPADM

## 2023-04-28 RX ORDER — ACETAMINOPHEN 325 MG/1
975 TABLET ORAL ONCE
Status: COMPLETED | OUTPATIENT
Start: 2023-04-28 | End: 2023-04-28

## 2023-04-28 RX ORDER — ONDANSETRON 4 MG/1
4 TABLET, ORALLY DISINTEGRATING ORAL EVERY 30 MIN PRN
Status: DISCONTINUED | OUTPATIENT
Start: 2023-04-28 | End: 2023-04-28 | Stop reason: HOSPADM

## 2023-04-28 RX ORDER — DEXAMETHASONE SODIUM PHOSPHATE 4 MG/ML
INJECTION, SOLUTION INTRA-ARTICULAR; INTRALESIONAL; INTRAMUSCULAR; INTRAVENOUS; SOFT TISSUE PRN
Status: DISCONTINUED | OUTPATIENT
Start: 2023-04-28 | End: 2023-04-28

## 2023-04-28 RX ORDER — FENTANYL CITRATE 50 UG/ML
INJECTION, SOLUTION INTRAMUSCULAR; INTRAVENOUS PRN
Status: DISCONTINUED | OUTPATIENT
Start: 2023-04-28 | End: 2023-04-28

## 2023-04-28 RX ORDER — PROPOFOL 10 MG/ML
INJECTION, EMULSION INTRAVENOUS PRN
Status: DISCONTINUED | OUTPATIENT
Start: 2023-04-28 | End: 2023-04-28

## 2023-04-28 RX ORDER — LIDOCAINE HYDROCHLORIDE 20 MG/ML
INJECTION, SOLUTION INFILTRATION; PERINEURAL PRN
Status: DISCONTINUED | OUTPATIENT
Start: 2023-04-28 | End: 2023-04-28

## 2023-04-28 RX ORDER — FENTANYL CITRATE 50 UG/ML
50 INJECTION, SOLUTION INTRAMUSCULAR; INTRAVENOUS EVERY 5 MIN PRN
Status: DISCONTINUED | OUTPATIENT
Start: 2023-04-28 | End: 2023-04-28 | Stop reason: HOSPADM

## 2023-04-28 RX ORDER — DIPHENHYDRAMINE HYDROCHLORIDE 50 MG/ML
25 INJECTION INTRAMUSCULAR; INTRAVENOUS EVERY 6 HOURS PRN
Status: DISCONTINUED | OUTPATIENT
Start: 2023-04-28 | End: 2023-04-28 | Stop reason: HOSPADM

## 2023-04-28 RX ORDER — HYDROMORPHONE HYDROCHLORIDE 1 MG/ML
0.2 INJECTION, SOLUTION INTRAMUSCULAR; INTRAVENOUS; SUBCUTANEOUS EVERY 5 MIN PRN
Status: DISCONTINUED | OUTPATIENT
Start: 2023-04-28 | End: 2023-04-28 | Stop reason: HOSPADM

## 2023-04-28 RX ORDER — LIDOCAINE HYDROCHLORIDE 10 MG/ML
INJECTION, SOLUTION EPIDURAL; INFILTRATION; INTRACAUDAL; PERINEURAL PRN
Status: DISCONTINUED | OUTPATIENT
Start: 2023-04-28 | End: 2023-04-28 | Stop reason: HOSPADM

## 2023-04-28 RX ORDER — PROPOFOL 10 MG/ML
INJECTION, EMULSION INTRAVENOUS CONTINUOUS PRN
Status: DISCONTINUED | OUTPATIENT
Start: 2023-04-28 | End: 2023-04-28

## 2023-04-28 RX ADMIN — LIDOCAINE HYDROCHLORIDE 50 MG: 20 INJECTION, SOLUTION INFILTRATION; PERINEURAL at 13:37

## 2023-04-28 RX ADMIN — PROPOFOL 60 MG: 10 INJECTION, EMULSION INTRAVENOUS at 13:37

## 2023-04-28 RX ADMIN — KETOROLAC TROMETHAMINE 30 MG: 30 INJECTION, SOLUTION INTRAMUSCULAR at 13:55

## 2023-04-28 RX ADMIN — FENTANYL CITRATE 25 MCG: 50 INJECTION, SOLUTION INTRAMUSCULAR; INTRAVENOUS at 13:37

## 2023-04-28 RX ADMIN — SODIUM CHLORIDE, POTASSIUM CHLORIDE, SODIUM LACTATE AND CALCIUM CHLORIDE: 600; 310; 30; 20 INJECTION, SOLUTION INTRAVENOUS at 13:31

## 2023-04-28 RX ADMIN — ONDANSETRON 4 MG: 2 INJECTION INTRAMUSCULAR; INTRAVENOUS at 13:37

## 2023-04-28 RX ADMIN — DEXAMETHASONE SODIUM PHOSPHATE 8 MG: 4 INJECTION, SOLUTION INTRA-ARTICULAR; INTRALESIONAL; INTRAMUSCULAR; INTRAVENOUS; SOFT TISSUE at 13:37

## 2023-04-28 RX ADMIN — MIDAZOLAM 2 MG: 1 INJECTION INTRAMUSCULAR; INTRAVENOUS at 13:31

## 2023-04-28 RX ADMIN — PROPOFOL 100 MCG/KG/MIN: 10 INJECTION, EMULSION INTRAVENOUS at 13:37

## 2023-04-28 RX ADMIN — ONDANSETRON 4 MG: 2 INJECTION INTRAMUSCULAR; INTRAVENOUS at 14:35

## 2023-04-28 ASSESSMENT — ACTIVITIES OF DAILY LIVING (ADL)
ADLS_ACUITY_SCORE: 35
ADLS_ACUITY_SCORE: 35

## 2023-04-28 ASSESSMENT — LIFESTYLE VARIABLES: TOBACCO_USE: 1

## 2023-04-28 NOTE — ANESTHESIA PREPROCEDURE EVALUATION
Pre-Operative H & P     CC:  Preoperative exam to assess for increased cardiopulmonary risk while undergoing surgery and anesthesia.    Date of Encounter: 4/25/2023  Primary Care Physician:  Kash Solano     Reason for visit: IUD check up; Abnormal CT scan, pelvis; Intramural leiomyoma of uterus      HPI  Janine Cornell is a 57 year old female who presents for pre-operative H & P in preparation for  Procedure Information     Case: [id number] Date/Time: 04/28/23 1325    Procedures:       EXAM UNDER ANESTHESIA (Vagina)      Remove intrauterine device, (Uterus)      ENDOMETRIAL BIOPSY (Uterus)      , POSSIBLE HYSTEROSCOPY, DIAGNOSTIC (Uterus)    Anesthesia type: MAC with Local    Diagnosis:       IUD check up [Z30.431]      Abnormal CT scan, pelvis [R93.5]      Intramural leiomyoma of uterus [D25.1]    Pre-op diagnosis:       IUD check up [Z30.431]      Abnormal CT scan, pelvis [R93.5]      Intramural leiomyoma of uterus [D25.1]    [facility]    Providers: Nahed Butler MD          Patient is being evaluated for comorbid conditions of CAD, h/o NSTEMI, s/p coronary stenting in 2011 & 2016, HTN, HLD, ICM, asthma, allergic rhinitis, tobacco dependence, migraines, neuropathy, Granulomatosis with polyangiitis (GPA) s/p R lateral orbitotomy, DM2, hypothyroidism, iron deficiency, GERD, chronic pancreatitis, fibromyalgia, rheumatoid arthritis, PCOS, chronic anticoagulation.      Ms. Cornell has a long history of upper abdominal pain and for this had a CT abdomen and pelvis on 2/10/23.  Notable findings include fibroid uterus with IUD at the fundus, but questionable embedment of the IUD.  This IUD was placed in the OR under ultrasound guidance in July of 2016.  She has not had any regular bleeding since but does have random spotting.   Patient is anxious about trial of IUD removal in the office.  She has not tolerated pelvic exams in past and concerned it may be difficult to remove. She now presents for  the above procedure.      History was obtained from patient & chart review.      Hx of abnormal bleeding or anti-platelet use: on Plavix    Menstrual history: No LMP recorded (lmp unknown). (Menstrual status: IUD).:       Past Medical History  Past Medical History:   Diagnosis Date     Abnormal glandular Papanicolaou smear of cervix 1992     Allergic rhinitis     Allergy, airborne subst     Arthritis      ASCVD (arteriosclerotic cardiovascular disease)      Chronic pain      Chronic pancreatitis (H)     idiopathic, Tx: PPI, antioxidants, pancreatic enzymes     Common migraine      Coronary artery disease      Costochondritis      Difficulty in walking(719.7)      Dyspnea on exertion      Ectasia, mammary duct     [facility], persistent nipple discharge     Elevated fasting glucose      Gastro-oesophageal reflux disease      Granulomatosis with polyangiitis (H)      Hernia      History of angina      Hyperlipidemia LDL goal < 100      Hypertension goal BP (blood pressure) < 140/90     Essential hypertension     Iron deficiency anemia      Ischemic cardiomyopathy      Menorrhagia      Migraine headaches      Mild persistent asthma      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 Matias, neg w/u, Rx'd w/prednisone     Ventral hernia, unspecified, without  mention of obstruction or gangrene 2012       Past Surgical History  Past Surgical History:   Procedure Laterality Date     C/SECTION, LOW TRANSVERSE  1996         CARDIAC SURGERY      cardiac stent- recent in 16; 4 other stents     DILATION AND CURETTAGE N/A 2016    Procedure: DILATION AND CURETTAGE;  Surgeon: Nahed Butler MD;  [facility]     ESOPHAGOSCOPY, GASTROSCOPY, DUODENOSCOPY (EGD), COMBINED N/A 2022    Procedure: ESOPHAGOGASTRODUODENOSCOPY, WITH BIOPSY;  Surgeon: Enzo Sesay MD;  [facility]     HC UGI ENDOSCOPY W EUS  2008    Dr. Pastrana, possible chronic pancreatitis, fatty liver     HERNIA REPAIR  2012    done at [facility]     INSERT INTRAUTERINE DEVICE N/A 2016    Procedure: INSERT INTRAUTERINE DEVICE;  Surgeon: Nahed Butler MD;  [facility]     INT UTERINE FIBRIOD EMBOLIZATION  10/29/2014     LAPAROSCOPIC CHOLECYSTECTOMY  2008    Dr. Arnol GRUBBS TX, CERVICAL  1992    s/p LEERAHUL, done at [facility] in [place].     ORBITOTOMY Right 03/15/2016    Procedure: ORBITOTOMY;  Surgeon: Myron Cyr MD;  Location: [facility]     ORBITOTOMY Right 2017    Procedure: ORBITOTOMY;  RIGHT ORBITOTOMY AND BIOPSY;  Surgeon: Charis Holbrook MD;  Location: [facility]     REPAIR PTOSIS Right 2017    Procedure: REPAIR PTOSIS;  RIGHT UPPER LID PTOSIS REPAIR;  Surgeon: Myron Cyr MD;  Location: [facility]     UPPER GI ENDOSCOPY  10/21/2008    mild gastritis, Dr. Rocky CALDERA ECHO HEART XTHORACIC,COMPLETE, W/O DOPPLER  2004    [facility] WNL, EF 60%       Prior to Admission Medications  No current outpatient medications on file.       Allergies  Allergies   Allergen Reactions     Amoxicillin-Pot Clavulanate      Unknown possible hives and edema     Amoxicillin-Pot Clavulanate      Artificial Sweetner (Informational Only)  Other (See Comments)     Increased headache     Azithromycin       Clavulanic Acid      Diatrizoate Other (See Comments)     Pt wants this listed because she is allergic to shellfish      Imitrex [Triptans]      Severe face/neck/chest tightness and flushing side effects      Penicillins Hives     Unknown      Pork Allergy      Stomach pain, cramping, diarrhea, itching, nausea and headaches     Shellfish Allergy Hives and Swelling     Shellfish-Derived Products      Sulfa Antibiotics Hives and Swelling     Sulfatolamide      Zithromax [Azithromycin Dihydrate] Swelling     Unknown        Social History  Social History     Socioeconomic History     Marital status: Single     Spouse name: Not on file     Number of children: 1     Years of education: Not on file     Highest education level: Not on file   Occupational History     Employer: NONE    Tobacco Use     Smoking status: Former     Packs/day: 0.20     Years: 1.00     Pack years: 0.20     Types: Cigarettes     Quit date: 2016     Years since quittin.2     Smokeless tobacco: Never   Vaping Use     Vaping status: Not on file   Substance and Sexual Activity     Alcohol use: No     Alcohol/week: 0.0 standard drinks of alcohol     Drug use: No     Sexual activity: Not Currently   Other Topics Concern     Parent/sibling w/ CABG, MI or angioplasty before 65F 55M? Yes   Social History Narrative    Balanced Diet - sometimes    Osteoporosis Prevention Measures - Dairy servings per day: 2 servings weekly    Regular Exercise -  Yes Describe walking 4 times a week    Dental Exam - NO    Seatbelts used - Yes    Self Breast Exam - Yes    Abuse: Current or Past (Physical, Sexual or Emotional)- No    Do you have any concerns about STD's -  No    Do you feel safe in your environment - No    Guns stored in the home - No    Sunscreen used - Yes    Lipids -  YES - Date:     Glucose -  YES - Date:     Eye Exam - YES - Date: one year ago    Colon Cancer Screening - No    Hemoccults - NO    Pap Test -  YES - Date: , remote  history of LEEP    Mammography - YES - Date: last spring, would like to discuss, needs a referral to [facility]    DEXA - NO    Immunizations reviewed and up to date - Yes, last td given in  or      Social Determinants of Health     Financial Resource Strain: Not on file   Food Insecurity: Not on file   Transportation Needs: Not on file   Physical Activity: Not on file   Stress: Not on file   Social Connections: Not on file   Intimate Partner Violence: Not on file   Housing Stability: Not on file       Family History  Family History   Problem Relation Age of Onset     Hypertension Mother      Arthritis Mother      Heart Disease Mother         long qt syndrome     Thyroid Disease Mother      C.A.D. Mother      Heart Disease Father 50        heart attack     Cerebrovascular Disease Father      Diabetes Father      Hypertension Father      Depression Father      C.A.D. Father      Neurologic Disorder Sister         migraines     Neurologic Disorder Sister         migraines     Heart Disease Brother 15        MI at 15, 16.      Arthritis Brother         he passed away, had severe arthritis at age 11     C.A.D. Brother      Anesthesia Reaction Son         PONV     Respiratory Son         asthma     Hypertension Maternal Aunt      Diabetes Maternal Uncle      Hypertension Maternal Uncle      Arthritis Maternal Uncle      Eye Disorder Maternal Uncle         cataracts     Cerebrovascular Disease Maternal Uncle      Family History Negative Other         neg for RA, SLE     Unknown/Adopted No family hx of         unknown neurological issues in her family, mother was adopted     Skin Cancer No family hx of         No known family hx of skin cancer     Deep Vein Thrombosis (DVT) No family hx of        Review of Systems  The complete review of systems is negative other than noted in the HPI or here.     Anesthesia Evaluation   Pt has had prior anesthetic.     History of anesthetic complications  -  "PONV.      ROS/MED HX  ENT/Pulmonary: Comment: Uses Advair daily. Last used albuterol one week ago.    (+) allergic rhinitis, tobacco use (smokes \"off & on\"), Intermittent, asthma Treatment: Inhaler daily,   (-) sleep apnea   Neurologic: Comment: Granulomatosis with polyangiitis (GPA) s/p R lateral orbitotomy    (+) peripheral neuropathy, - hands & feet, legs & arms. migraines,     Cardiovascular: Comment: Last seen by cardiology 7/7/22; stable.      (+) hypertension--CAD -past MI -stent-2011 & 2016. 3 Drug Eluting Stent. Taking blood thinners Instructions Given to patient: on Plavix. ROBERTS. Previous cardiac testing   Echo: Date: 5/25/21 Results:  Global and regional left ventricular function is normal with an EF of 60-65%.   Global right ventricular function is normal.   No significant valvular abnormalities present.   The inferior vena cava was normal in size with preserved respiratory variability.   No pericardial effusion is present.    Stress Test:  Date: 2017 Results:  Normal exercise echocardiogram without evidence of inducible ischemia.    Target heart rate was achieved. Heart rate and blood pressure response to exercise were normal. With stress, the left ventricular ejection fraction increased from 55-60% to greater than 65% and the left ventricular size decreased appropriately.    Normal functional capacity. No subjective symptoms to suggest ischemia.    There was no ECG evidence of ischemia.    No significant valve disease on screening doppler evaluation. The aortic root and visualized ascending aorta are normal.    ECG Reviewed:  Date: Results:    Cath:  Date: Results:      METS/Exercise Tolerance: 1 - Eating, dressing Comment: Able to walk one block. Endorses SOB, particularly with humid or cold weather. Diffuse joint pain, fatigue. Long-standing symptoms.   Hematologic:       Musculoskeletal: Comment: Rheumatoid arthritis, receives Rituximab Q6 months (last infusion 3/2023)    Fibromyalgia      "   GI/Hepatic: Comment: Chronic pancreatitis    GERD symptoms fluctuate, despite using Pepcid & Nexium. Has been mild for several months now.    (+) GERD,  (-) liver disease   Renal/Genitourinary:  - neg Renal ROS  (-) renal disease   Endo: Comment: PCOS      (+) type II DM, Last HgA1c: 8.5, date: 11/23/22, Using insulin, thyroid problem,  multinodular goiter, Obesity,     Psychiatric/Substance Use:  - neg psychiatric ROS     Infectious Disease:  - neg infectious disease ROS     Malignancy:  - neg malignancy ROS     Other:  - neg other ROS          LMP  (LMP Unknown)     Physical Exam  Constitutional: Awake, alert, cooperative, no apparent distress, and appears stated age.  Eyes: Pupils equal, round and reactive to light, extra ocular muscles intact, sclera clear, conjunctiva normal.  HENT: Normocephalic, oral pharynx with moist mucus membranes, good dentition. No goiter appreciated. No removable dental hardware.  Respiratory: Clear to auscultation bilaterally, no crackles or wheezing. No SOB when supine.  Cardiovascular: Regular rate and rhythm, normal S1 and S2, and no murmur noted.  Carotids +2, no bruits. No edema. Palpable pulses to radial, DP and PT arteries.   GI: Normal bowel sounds, soft, non-distended, non-tender, no masses palpated.    Lymph/Hematologic: No cervical lymphadenopathy and no supraclavicular lymphadenopathy.  Genitourinary:  deferred  Skin: Warm and dry.  No rashes.   Musculoskeletal: Limited extension ROM of neck. There is no redness, warmth, or swelling of the joints. Gross motor strength is diminished in LEs.    Neurologic: Awake, alert, oriented to name, place and time. Cranial nerves II-XII are grossly intact. Gait is normal. Ambulates from chair to exam table, seats self, lies supine and sits back up w/o assistance.  Neuropsychiatric: Calm, cooperative. Normal affect. Pleasant. Answers questions appropriately, follows commands w/o difficulty.        PRIOR LABS/DIAGNOSTIC STUDIES:    All  labs and imaging personally reviewed        Stress test -  please see in ROS above     Echo result w/o MOPS 5/25/21: Interpretation SummaryGlobal and regional left ventricular function is normal with an EF of 60-65%.Global right ventricular function is normal.No significant valvular abnormalities present.The inferior vena cava was normal in size with preserved respiratoryvariability.No pericardial effusion is present.       The patient's records and results personally reviewed by this provider.       LABS signed/ held by Dr. Butler:  T&S, Hgb    Assessment      Janine Cornell is a 57 year old female seen as a PAC referral for risk assessment and optimization for anesthesia.    Plan/Recommendations  Pt will be optimized for the proposed procedure.  See below for details on the assessment, risk, and preoperative recommendations    NEUROLOGY  - No history of TIA, CVA or seizure  - Granulomatosis with polyangiitis (GPA) s/p R lateral orbitotomy    -Post Op delirium risk factors:  No risk identified    ENT  - No current airway concerns.  Will need to be reassessed day of surgery.  Mallampati: III  TM: > 3   - Decreased extension ROM of neck due to pain/ arthritis    CARDIAC  - CAD, s/p coronary stenting in 2011 & 2016  - Last seen by cardiology 7/7/22; stable, no additional testing indicated.  - HTN, will hold lisinopril-hydrochlorothiazide & spironolactone DOS  - Echo 5/25/21: EF 60-65%, normal function  - Stress test 2017: no ischemia    - METS (Metabolic Equivalents)  Able to walk one block. Endorses SOB, particularly with humid or cold weather. Diffuse joint pain, fatigue. Long-standing symptoms.    Patient CANNOT perform 4 METS exercise without symptoms            Total Score: 1    Functional Capacity: Unable to complete 4 METS      RCRI-Moderate risk: Class 3  6.6% complication rate            Total Score: 2    RCRI: CAD    RCRI: Diabetes        PULMONARY  BRENNA Low Risk            Total Score: 2    BRENNA: Hypertension  "   BRENNA: Over 50 ys old      - Asthma, Uses Advair daily. Last used albuterol one week ago.    - Tobacco History      History   Smoking Status     Former     Packs/day: 0.20     Years: 1.00     Types: Cigarettes     Quit date: 2/1/2016   Smokeless Tobacco     Never       GI  - GERD symptoms fluctuate, despite using Pepcid & Nexium. Has been mild for several months now.  PONV High Risk  Total Score: 4           1 AN PONV: Pt is Female    1 AN PONV: Patient is not a current smoker    1 AN PONV: Patient has history of PONV    1 AN PONV: Intended Post Op Opioids      - Hx pancreatitis      ENDOCRINE    - BMI: Estimated body mass index is 31.44 kg/m  as calculated from the following:    Height as of an earlier encounter on 4/28/23: 1.575 m (5' 2.01\").    Weight as of an earlier encounter on 4/28/23: 78 kg (171 lb 15.3 oz).  Overweight (BMI 25.0-29.9)  - IDDM, A1c on 11/23/22 was 8.5  - Hold morning oral hypoglycemic medications and short acting insulin DOS. Take 80% of last scheduled dose of long-acting insulin prior to procedure.  Recommend close monitoring of the patient's blood glucose levels throughout the perioperative period.  - PCOS      HEME  VTE Low Risk 0.26%            Total Score: 0      - On Plavix, last dose 4/24      MSK  - Rheumatoid arthritis, receives Rituximab Q6 months (last infusion 3/2023)  - Fibromyalgia      The patient is aware that the final anesthesia plan will be decided by the assigned anesthesia provider on the date of service.    The patient is optimized for their procedure. AVS with information on surgery time/arrival time, meds and NPO status given by nursing staff. No further diagnostic testing indicated.      On the day of service:     Prep time: 16 minutes  Visit time: 26 minutes  Documentation time: 15 minutes  ------------------------------------------  Total time: 57 minutes      Maria Luisa Mendoza PA-C  Preoperative Assessment Center  [facility]  Clinic and Surgery " [place]  Phone: [phone]  Fax: [phone]    Physical Exam    Airway        Mallampati: III   TM distance: > 3 FB   Neck ROM: full   Mouth opening: > 3 cm    Respiratory Devices and Support         Dental           Cardiovascular   cardiovascular exam normal       Rhythm and rate: regular and normal     Pulmonary   pulmonary exam normal        breath sounds clear to auscultation             Anesthesia Plan    ASA Status:  3      Anesthesia Type: MAC.     - Reason for MAC: chronic cardiopulmonary disease, straight local not clinically adequate   Induction: Intravenous, Propofol.   Maintenance: TIVA.        Consents    Anesthesia Plan(s) and associated risks, benefits, and realistic alternatives discussed. Questions answered and patient/representative(s) expressed understanding.    - Discussed:     - Discussed with:  Patient      - Extended Intubation/Ventilatory Support Discussed: No.      - Patient is DNR/DNI Status: No    Use of blood products discussed: No .     Postoperative Care    Pain management: IV analgesics, Oral pain medications, Multi-modal analgesia.   PONV prophylaxis: Ondansetron (or other 5HT-3), Background Propofol Infusion     Comments:

## 2023-04-28 NOTE — DISCHARGE INSTRUCTIONS
Same-Day Surgery   Adult Discharge Orders & Instructions     For 24 hours after surgery:  Get plenty of rest.  A responsible adult must stay with you for at least 24 hours after you leave the hospital.   Pain medication can slow your reflexes. Do not drive or use heavy equipment.  If you have weakness or tingling, don't drive or use heavy equipment until this feeling goes away.  Mixing alcohol and pain medication can cause dizziness and slow your breathing. It can even be fatal. Do not drink alcohol while taking pain medication.  Avoid strenuous or risky activities.  Ask for help when climbing stairs.   You may feel lightheaded.  If so, sit for a few minutes before standing.  Have someone help you get up.   If you have nausea (feel sick to your stomach), drink only clear liquids such as apple juice, ginger ale, broth or 7-Up.  Rest may also help.  Be sure to drink enough fluids.  Move to a regular diet as you feel able. Take pain medications with a small amount of solid food, such as toast or crackers, to avoid nausea.   A slight fever is normal. Call the doctor if your fever is over 100 F (37.7 C) (taken under the tongue) or lasts longer than 24 hours.  You may have a dry mouth, muscle aches, trouble sleeping or a sore throat.  These symptoms should go away after 24 hours.  Do not make important or legal decisions.   Pain Management:      1. Take pain medication (if prescribed) for pain as directed by your physician.        2. WARNING: If the pain medication you have been prescribed contains Tylenol  (acetaminophen), DO NOT take additional doses of Tylenol (acetaminophen).     Call your doctor for any of the followin.  Signs of infection (fever, growing tenderness at the surgery site, severe pain, a large amount of drainage or bleeding, foul-smelling drainage, redness, swelling).    2.  It has been over 8 to 10 hours since surgery and you are still not able to urinate (pee).    3.  Headache for over 24  hours.    4.  Numbness, tingling or weakness the day after surgery (if you had spinal anesthesia).  To contact a doctor, call Dr. Nahed Butler at [facility] [phone]   or:  '   [phone] and ask for the Resident On Call for: OBGYN resident (answered 24 hours a day)  '   Emergency Department:  [facility]: [phone]  [facility]: [phone]               Rev. 10/2014

## 2023-04-28 NOTE — BRIEF OP NOTE
Gynecology Brief Op Note    Janine Cornell  [id number]    Date of Surgery: 4/28/2023    Surgeon: Nahed Butler MD    Assistants: Buck Roberto MD, MPH, MS (PGY-1)     Pre-operative Diagnoses: Malpositioned intrauterine device    Post-operative Diagnoses: same    Procedure: EUA, IUD removal, and EMB    Anesthesia: MAC    EBL: 5 cc  IVF: 400 cc  UO: Voided prior to case    Complications: None apparent  Findings:  Exam under anesthesia revealed anteverted uterus. Small, postmenopausal mobile with no adnexal masses. IUD expelled during vaginal prep by RN prior to start of case    Specimens: mIUD, endometrial samplings     Buck Roberto MD, MPH, MS (PGY1)   Obstetrics, Gynecology & Women's Health   Resident, PGY-1  04/28/2023 1:35 PM

## 2023-04-28 NOTE — ANESTHESIA CARE TRANSFER NOTE
Patient: Janine Cornell    Procedure: Procedure(s):  EXAM UNDER ANESTHESIA  Remove intrauterine device,  ENDOMETRIAL BIOPSY       Diagnosis: IUD check up [Z30.431]  Abnormal CT scan, pelvis [R93.5]  Intramural leiomyoma of uterus [D25.1]  Diagnosis Additional Information: No value filed.    Anesthesia Type:   MAC     Note:    Oropharynx: oropharynx clear of all foreign objects and spontaneously breathing  Level of Consciousness: drowsy  Oxygen Supplementation: room air    Independent Airway: airway patency satisfactory and stable  Dentition: dentition unchanged  Vital Signs Stable: post-procedure vital signs reviewed and stable  Report to RN Given: handoff report given  Patient transferred to: Phase II    Handoff Report: Identifed the Patient, Identified the Reponsible Provider, Reviewed the pertinent medical history, Discussed the surgical course, Reviewed Intra-OP anesthesia mangement and issues during anesthesia, Set expectations for post-procedure period and Allowed opportunity for questions and acknowledgement of understanding      Vitals:  Vitals Value Taken Time   /87 04/28/23 1405   Temp 36.7  C (98.1  F) 04/28/23 1405   Pulse 66 04/28/23 1405   Resp 18 04/28/23 1405   SpO2 96 % 04/28/23 1408   Vitals shown include unvalidated device data.    Electronically Signed By: KAYLYNN Mancuso CRNA  April 28, 2023  2:08 PM

## 2023-04-28 NOTE — LETTER
"May 2, 2023      Janine Cornell  [address] [facility] [address]        Dear ,    We are writing to inform you of your test results.    Your biopsy was completely normal.  I hope you recovered quickly after the procedure.  I will see you in June.  Take care.    Resulted Orders   Adult Type and Screen   Result Value Ref Range    ABO/RH(D) A POS     Antibody Screen Negative Negative    SPECIMEN EXPIRATION DATE [id number]    Surgical Pathology Exam   Result Value Ref Range    Case Report       Surgical Pathology Report                         Case: [id number]                                  Authorizing Provider:  Nahed Butler MD       Collected:           04/28/2023 01:54 PM          Ordering Location:     [facility]                 Received:            04/28/2023 02:24 PM          Pathologist:           Dona Garcia MD                                                             Specimen:    Endometrium                                                                                Final Diagnosis       Endometrium, curettage:  - Fragments of inactive endometrium, decidualized stroma consistent with exogenous progesterone effect  - Negative for hyperplasia or malignancy      Clinical Information       Patient is 57-year-old female with leiomyoma of the uterus on examination, presented for removal of IUD.      Gross Description       A(1). Endometrium, :  The specimen is received in formalin with proper patient identification, labeled \"endometrial curettings\".  The specimen consists of a 2.0 x 1.5 x 0.3 cm aggregate of pink-red hemorrhagic tissue and cloudy mucus which is wrapped and entirely submitted in cassette A1.       Microscopic Description       Microscopic examination is performed.  A resident/fellow in an ACGME accredited training program was involved in the initial review, preparation, " and/or interpretation of this case.  I, as the senior physician, attest that I: (i) reviewed patient clinical records if indicated; (ii) reviewed relevant lab test results; (iii) examined the relevant preparation(s) for the specimen(s); and (iv) agree with the report, diagnosis(es), and interpretation as documented by the resident/fellow and edited/confirmed by me. Reporting resident/fellow: [place] Luna.      Performing Labs       The technical component of this testing was completed at [facility]        Case Images         If you have any questions or concerns, please call the clinic at the number listed above.       Sincerely,      Nahed Butler MD

## 2023-04-28 NOTE — ANESTHESIA POSTPROCEDURE EVALUATION
Patient: Janine Cornell    Procedure: Procedure(s):  EXAM UNDER ANESTHESIA  Remove intrauterine device,  ENDOMETRIAL BIOPSY       Anesthesia Type:  MAC    Note:  Disposition: Outpatient   Postop Pain Control: Uneventful            Sign Out: Well controlled pain   PONV: No   Neuro/Psych: Uneventful            Sign Out: Acceptable/Baseline neuro status   Airway/Respiratory: Uneventful            Sign Out: Acceptable/Baseline resp. status   CV/Hemodynamics: Uneventful            Sign Out: Acceptable CV status; No obvious hypovolemia; No obvious fluid overload   Other NRE: NONE   DID A NON-ROUTINE EVENT OCCUR? No           Last vitals:  Vitals Value Taken Time   /84 04/28/23 1420   Temp 36.7  C (98.1  F) 04/28/23 1405   Pulse 61 04/28/23 1420   Resp 18 04/28/23 1405   SpO2 98 % 04/28/23 1425   Vitals shown include unvalidated device data.    Electronically Signed By: Cynthia Jacob MD  April 28, 2023  2:26 PM

## 2023-05-01 LAB
PATH REPORT.COMMENTS IMP SPEC: NORMAL
PATH REPORT.COMMENTS IMP SPEC: NORMAL
PATH REPORT.FINAL DX SPEC: NORMAL
PATH REPORT.GROSS SPEC: NORMAL
PATH REPORT.MICROSCOPIC SPEC OTHER STN: NORMAL
PATH REPORT.RELEVANT HX SPEC: NORMAL
PHOTO IMAGE: NORMAL

## 2023-05-02 ENCOUNTER — PRE VISIT (OUTPATIENT)
Dept: ENDOCRINOLOGY | Facility: CLINIC | Age: 58
End: 2023-05-02

## 2023-05-02 ENCOUNTER — TELEPHONE (OUTPATIENT)
Dept: OBGYN | Facility: CLINIC | Age: 58
End: 2023-05-02

## 2023-05-02 NOTE — TELEPHONE ENCOUNTER
I tried to reach patient to review pathology report.  Call went to voicemail.  I will send letter and patient has visit scheduled in June.    Nahed Butler MD

## 2023-05-03 NOTE — OP NOTE
[facility] Gynecology Operative Note    Pre-operative diagnosis: Malpositioned intrauterine device  Postmenopausal spotting   Post-operative diagnosis: Same   Procedure: Exam under anesthesia  IUD removal  Endometrial biopsy   Surgeon: Nahed Butler MD   Assistant(s): Buck Roberto MD, MPH, MS (PGY-1)   Anesthesia: MAC (monitored anesthesia care)  Paracervical block   Estimated blood loss: 5 mL   Total IV fluids: (See anesthesia record)  400ml   Blood transfusion: No transfusion was given during surgery   Total urine output: (See anesthesia record)  Voided prior to the case   Drains: None   Specimens: Endometrial biopsy   Findings: Small anteverted uterus, no adnexal masses, IUD strings palpable on exam and IUD was expelled with vaginal prep.   Complications: None   Condition: Stable   Details of procedure: Patient was brought to the OR, she was placed in lithotomy position.  A surgical time-out was completed. Once adequate sedation was achieved a bimanual exam was completed with the above findings.  She was prepped and draped in a sterile fashion. As the RN completed the vaginal prep the IUD strings were caught on the sponge and the IUD expelled intact.  A speculum was placed in the vagina and cervix visualized.  A paracervical block with 20cc of 1% lidocaine was administered.  The Pipelle was introduced through the cervix to the fundus and with 2 passes an adequate sample collected.  The tenaculum was removed and tenaculum sites were hemostatic with pressure.  All instruments were removed from the vagina and patient transferred to the PACU in stable condition.    Nahed Butler MD

## 2023-05-10 ENCOUNTER — TELEPHONE (OUTPATIENT)
Dept: GASTROENTEROLOGY | Facility: CLINIC | Age: 58
End: 2023-05-10

## 2023-05-10 DIAGNOSIS — R10.13 EPIGASTRIC PAIN: Primary | ICD-10-CM

## 2023-05-10 RX ORDER — BISACODYL 5 MG/1
TABLET, DELAYED RELEASE ORAL
Qty: 4 TABLET | Refills: 0 | Status: SHIPPED | OUTPATIENT
Start: 2023-05-10 | End: 2023-06-21

## 2023-05-10 NOTE — TELEPHONE ENCOUNTER
"Patient returned call    Pre assessment questions completed for upcoming Colonoscopy/Upper endoscopy (EGD) procedure scheduled on 5/25/23    COVID policy reviewed.     Pre-op exam? N/A    Reviewed procedural arrival time 0930, procedure time 1030 and facility location [facility]; [address] [place] [address], [address] Floor, [place] [address]    Designated  policy reviewed. Instructed to have someone stay 24 hours post procedure.     NSAIDs? No    Anticoagulation/blood thinners? Yes Clopidogrel (Plavix). Holding interval of 5 days. Patient was advised to reach out to prescribing provider to discuss holding blood thinner.     Electronic implanted devices? No    Diabetic? Yes - Patient to hold oral diabetic medications day of procedure Patient states she had an A1c drawn outside of  in April, not sure on the exact number but states it was \"8-something.\"     Procedure indication: epigastric pain    Bowel prep recommendation: Extended prep Golytely  Patient is still taking tramadol nearly every day.     Reviewed procedure prep instructions.     Patient states for previous colonoscopy she only had to drink magnesium citrate, did discuss that we do not have a prep that only involves that.     Prep instructions sent via letter, patient states she received a packet from [employer], she will double check to make sure the bowel prep instructions are in there. If not she will call back.      Bowel prep script originaly sent to [facility] in [place], per patient she would like it at  [place], [place] - [phone].     Patient verbalized understanding and had no questions or concerns at this time.    Nalini Preciado RN  Endoscopy Procedure Pre Assessment RN      "

## 2023-05-11 NOTE — IP AVS SNAPSHOT
MRN:[id number]                      After Visit Summary   8/4/2017    Janine Cornell    MRN: [id number]           Thank you!     Thank you for choosing [facility] for your care. Our goal is always to provide you with excellent care. Hearing back from our patients is one way we can continue to improve our services. Please take a few minutes to complete the written survey that you may receive in the mail after you visit with us. Thank you!        Patient Information     Date Of Birth          1965        About your hospital stay     You were admitted on:  August 4, 2017 [facility]    You were discharged on:  August 4, 2017       Who to Call     For medical emergencies, please call 911.  For non-urgent questions about your medical care, please call your primary care provider or clinic, [phone]  For questions related to your surgery, please call your surgery clinic        Attending Provider     Provider Charis Vinson MD Ophthalmology       Primary Care Provider Office Phone # Fax #    Kash Solano [phone]      After Care Instructions     Discharge Medication Instructions       Do NOT take aspirin or medications containing NSAIDS for 72 hours after procedure.            Ice to affected area       Apply cold pack for 15 minutes on, 15 minutes off, for 48 hours while awake.                  Your next 10 appointments already scheduled     Aug 15, 2017  1:45 PM CDT   (Arrive by 1:30 PM)   Post-Op with Charis Holbrook MD   [facility] ([facility])    [address] [place] [address]  4th Floor  [facility] [address]   [phone]            Dec 20, 2017  3:30 PM CST   Lab with  LAB   [facility] ([facility])    [address] [place] [address]  [address]st [facility] [address]   [phone]            Dec 20, 2017  4:00 PM CST   (Arrive by 3:45 PM)    Return Visit with Milan Peace MD   [facility] ([facility] [place])    [address]  3rd Floor  [facility] [address] [phone]              Further instructions from your care team       Same Day Surgery Discharge Instructions for  Sedation and General Anesthesia       It's not unusual to feel dizzy, light-headed or faint for up to 24 hours after surgery or while taking pain medication.  If you have these symptoms: sit for a few minutes before standing and have someone assist you when you get up to walk or use the bathroom.      You should rest and relax for the next 24 hours. We recommend you make arrangements to have an adult stay with you for at least 24 hours after your discharge.  Avoid hazardous and strenuous activity.      DO NOT DRIVE any vehicle or operate mechanical equipment for 24 hours following the end of your surgery.  Even though you may feel normal, your reactions may be affected by the medication you have received.      Do not drink alcoholic beverages for 24 hours following surgery.       Slowly progress to your regular diet as you feel able. It's not unusual to feel nauseated and/or vomit after receiving anesthesia.  If you develop these symptoms, drink clear liquids (apple juice, ginger ale, broth, 7-up, etc. ) until you feel better.  If your nausea and vomiting persists for 24 hours, please notify your surgeon.        All narcotic pain medications, along with inactivity and anesthesia, can cause constipation. Drinking plenty of liquids and increasing fiber intake will help.      For any questions of a medical nature, call your surgeon.      Do not make important decisions for 24 hours.      If you had general anesthesia, you may have a sore throat for a couple of days related to the breathing tube used during surgery.  You may use Cepacol lozenges to help with this discomfort.  If it worsens or if you develop a fever, contact your surgeon.        If you feel your pain is not well managed with the pain medications prescribed by your surgeon, please contact your surgeon's office to let them know so they can address your concerns.     Post-operative Instructions    Ophthalmic Plastic and Reconstructive Surgery  Mike Thompson M.D.  Charis Holbrook M.D.  Yesi Valdez M.D.    All instructions apply to the operated eye(s) or eyelid(s)      What to expect after surgery:    Thre will be some swelling, bruising, and likely a black eye (even into the lower eyelids and cheeks). Also expect crusting and discharge from the eye and/or incisions.     A small amount of surface bleeding is normal for the first 48 hours after surgery.    You may notice some bloody tears for the first few days after surgery. This is normal.    Your eye(s) and eyelid(s) may be painful and tender. This is normal after surgery. Use the pain medication as prescribed. If your pain does not improve despite the medication, contact the office.    Wound care and personal care:    If a patch or bandage has been placed, please leave this in place until seen in clinic. Prevent the bandage from getting wet.     Apply ice compresses 15 minutes on 15 minutes off while awake for the first 2 days after surgery, then switch to warm compresses 4 times a day until seen by your physician.     For warm packs you can place a cup of dry uncooked rice in a clean cotton sock. Place sock in microwave 30 seconds to one minute. Next place the warm sock into a plastic bag and wrap the bag with clean warm wet washcloth and place over operated eye.      You may shower or wash your hair the day after surgery. Do not bathe or go swimming for 1 week to prevent contamination of your wounds.    Do not apply make-up to the eyes or eyelids for 2 weeks after surgery.      Activity restrictions and driving:    Avoid heavy lifting, bending, exercise or strenuous activity for 1 week after surgery.    You may resume  other activities and return to work as tolerated.    You may not resume driving until have you stopped using narcotic pain medications(such as Norco, Percocet, Tylenol #3).    Medications:    Restart all your regular home medications and eye drops today. If you take Plavix or Aspirin on a regular basis, wait for 3 days after your surgery before restarting these in order to decrease the risk of bleeding complications.    Avoid aspirin and aspirin-like medications (Motrin, Aleve, Ibuprofen, [place] etc) for 5 days to reduce the risk of bleeding. You may take Tylenol (acetaminophen) for pain.    In addition to your home medications, take the following post-operative medications as prescribed by your physician:    Apply antibiotic ointment (erythromycin) to all sutures three times a day, and into the operated eye(s) at night.    Take 1 to 2 pain pills (norco or tylenol 3 as prescribed) as needed for pain up to every 4 hours.    The pain pills may make you drowsy. You must not drive a car, operate heavy machinery or drink alcohol while taking them.    The pain pills may cause constipation and nausea. Take them with some food to prevent a stomach upset. If you continue to experience nausea, call your physician.      WARNING: All the prescription pain medications listed above contain Tylenol (acetaminophen). You must not take more than 4,000 mg of acetaminophen per 24-hour period. This is equivalent to 6 tablets of Darvocet, 8 tablets of Vicodin, or 12 tablets of Norco, Percocet or Tylenol #3. If you take other over-the-counter medications containing acetaminophen, you must take the amount of acetaminophen into account and reduce the number of prescribed pain pills accordingly.    Contact information and follow-up:    Return to the Eye Clinic for a follow-up appointment with your physician as  scheduled. If no appointment has been scheduled, call [phone] for an  appointment with Dr. Thompson within 1 to 2 weeks  "from your date of surgery.      For severe pain, bleeding, or loss of vision, call the Eye Clinic at [phone].    After hours or on weekends and holidays, call [phone] and ask to speak with the ophthalmologist on call.    While you were at the hospital today you were given 650 mg of Tylenol. The recommended daily maximum dose is 4000 mg.       Pending Results     Date and Time Order Name Status Description    8/4/2017 1147 Leukemia Lymphoma Evaluation (Flow Cytometry) In process     8/4/2017 1147 Surgical pathology exam In process             Admission Information     Date & Time Provider Department Dept. Phone    8/4/2017 Charis Holbrook MD [facility] Same Day Surgery [phone]      Your Vitals Were     Blood Pressure Pulse Temperature Respirations Height Weight    101/70 73 95.2  F (35.1  C) (Temporal) 12 1.6 m (5' 3\") 73.6 kg (162 lb 4.8 oz)    Pulse Oximetry BMI (Body Mass Index)                96% 28.75 kg/m2          [employer] Information     [employer] gives you secure access to your electronic health record. If you see a primary care provider, you can also send messages to your care team and make appointments. If you have questions, please call your primary care clinic.  If you do not have a primary care provider, please call [phone] and they will assist you.        Care EveryWhere ID     This is your Care EveryWhere ID. This could be used by other organizations to access your [facility] medical records  [id number]        Equal Access to Services     MANDA QUINN : Hadii frederick harkinso Sohayali, waaxda luqadaha, qaybta kaalmada alex, gagandeep phillip . So [facility] [phone].    ATENCIÓN: Si habla español, tiene a burch disposición servicios gratuitos de asistencia lingüística. Llame al [phone].    We comply with applicable federal civil rights laws and [place] laws. We do not discriminate on the basis of race, color, national origin, age, disability sex, " sexual orientation or gender identity.               Review of your medicines      START taking        Dose / Directions    bacitracin ophthalmic ointment   Used for:  Postoperative eye state        Apply to incision line three times daily.   Quantity:  3.5 g   Refills:  0       HYDROcodone-acetaminophen 5-325 MG per tablet   Commonly known as:  NORCO   Used for:  Postoperative eye state   Notes to Patient:  You received 1 tablet at 1:35 pm.  You had a 2nd tablet at 2:15 pm        Dose:  1 tablet   Take 1 tablet by mouth every 6 hours as needed for pain Maximum of 4000 mg of acetaminophen in 24 hours.   Quantity:  10 tablet   Refills:  0         CONTINUE these medicines which may have CHANGED, or have new prescriptions. If we are uncertain of the size of tablets/capsules you have at home, strength may be listed as something that might have changed.        Dose / Directions    hydrALAZINE 25 MG tablet   Commonly known as:  APRESOLINE   This may have changed:    - when to take this  - additional instructions   Used for:  Essential hypertension        Dose:  12.5 mg   Take 0.5 tablets (12.5 mg) by mouth 3 times daily as needed , for systolic blood pressure >140 mmHg.   Quantity:  90 tablet   Refills:  3       vitamin D 38004 UNIT capsule   Commonly known as:  ERGOCALCIFEROL   This may have changed:  additional instructions   Used for:  Vitamin D deficiency        Dose:  54023 Units   Take 1 capsule (50,000 Units) by mouth every 7 days Need a Vitamin D level in 2 months.   Quantity:  8 capsule   Refills:  0         CONTINUE these medicines which have NOT CHANGED        Dose / Directions    acetaminophen 325 MG tablet   Commonly known as:  TYLENOL   Used for:  Other chronic pain, Headache(784.0)        Dose:  325-650 mg   Take 1-2 tablets (325-650 mg) by mouth every 6 hours as needed for pain (headache)   Quantity:  250 tablet   Refills:  0       AEROSPAN 80 MCG/ACT Aers oral inhaler   Generic drug:  flunisolide HFA         INHALE 2 PUFFS PO BID.  RINSE MOUTH AFTERWARDS   Refills:  4       * albuterol 108 (90 BASE) MCG/ACT Inhaler   Commonly known as:  PROAIR HFA/PROVENTIL HFA/VENTOLIN HFA        Dose:  2 puff   Inhale 2 puffs into the lungs every 6 hours as needed for shortness of breath / dyspnea or wheezing   Quantity:  3 Inhaler   Refills:  1       * albuterol (2.5 MG/3ML) 0.083% neb solution        INL 1 VIAL VIA NEBULIZATION Q 4 TO 6 HOURS PRN   Refills:  1       ASPIRIN LOW DOSE 81 MG EC tablet   Used for:  Cardiomyopathy, unspecified (H)   Generic drug:  aspirin        TAKE 1 TABLET BY MOUTH EVERY DAY   Quantity:  90 tablet   Refills:  3       blood glucose monitoring lancets   Used for:  Type 2 diabetes, HbA1c goal < 7% (H)        Use to test blood sugars 4 times daily or as directed.   Quantity:  4 Box   Refills:  3       blood glucose monitoring meter device kit   Commonly known as:  no brand specified   Used for:  Type 2 diabetes, HbA1c goal < 7% (H)        Use to test blood sugar 4 X times daily or as directed.   Quantity:  1 kit   Refills:  0       blood glucose monitoring test strip   Commonly known as:  no brand specified   Used for:  Type 2 diabetes, HbA1c goal < 7% (H)        Dose:  1 strip   1 strip by In Vitro route 4 times daily Test as directed. Please dispense three months and three months of refills. Thank you.   Quantity:  360 each   Refills:  3       clopidogrel 75 MG tablet   Commonly known as:  PLAVIX        Dose:  75 mg   Take 1 tablet (75 mg) by mouth daily   Quantity:  90 tablet   Refills:  3       * COMPRESSION STOCKINGS   Used for:  Bilateral lower extremity edema, Venous incompetence        Dose:  2 each   2 each daily   Quantity:  4 each   Refills:  2       * COMPRESSION STOCKINGS   Used for:  Venous incompetence, Bilateral lower extremity edema        Dose:  2 each   2 each daily Apply one 10-15 mmHg compression stocking to each lower extgmierty during the day and remove before bedtime.    Quantity:  4 each   Refills:  2       cyclobenzaprine 10 MG tablet   Commonly known as:  FLEXERIL   Used for:  Fibromyalgia        Dose:  10 mg   Take 1 tablet (10 mg) by mouth 2 times daily as needed for muscle spasms   Quantity:  180 tablet   Refills:  0       desonide 0.05 % cream   Commonly known as:  DESOWEN        Apply topically 2 times daily   Refills:  0       dicyclomine 20 MG tablet   Commonly known as:  BENTYL   Used for:  Other irritable bowel syndrome        Dose:  20 mg   Take 1 tablet (20 mg) by mouth 4 times daily as needed   Quantity:  60 tablet   Refills:  5       diphenhydrAMINE 25 MG capsule   Commonly known as:  BENADRYL        TK 1 TO 2 CS PO QHS   Refills:  4       EPIPEN 2-RIKY 0.3 MG/0.3ML injection 2-pack   Generic drug:  EPINEPHrine        INJECT 0.3 MG INTO THE MUSCLE PRF ANAPHYALAXIS   Refills:  0       ferrous gluconate 324 (38 FE) MG tablet   Commonly known as:  FERGON   Used for:  AILEEN (iron deficiency anemia)        Dose:  1 tablet   Take 1 tablet (324 mg) by mouth 2 times daily   Quantity:  180 tablet   Refills:  1       fexofenadine 180 MG tablet   Commonly known as:  ALLEGRA   Used for:  Chronic rhinitis        Dose:  180 mg   Take 1 tablet by mouth daily as needed.   Quantity:  90 tablet   Refills:  3       fluocinolone 0.01 % solution   Commonly known as:  SYNALAR        Apply topically daily as needed   Refills:  0       fluocinonide 0.05 % solution   Commonly known as:  LIDEX   Used for:  Telogen effluvium        Apply topically 2 times daily To areas of itch on the scalp as needed.   Quantity:  60 mL   Refills:  11       hydroxychloroquine 200 MG tablet   Commonly known as:  PLAQUENIL   Used for:  Inflammatory arthritis        Dose:  400 mg   Take 2 tablets (400 mg) by mouth daily EYE EXAM DUE/LETTER SENT   Quantity:  180 tablet   Refills:  0       insulin glargine 100 UNIT/ML injection   Used for:  Type 2 diabetes mellitus without complication (H)        Dose:  40 Units    Inject 40 Units Subcutaneous daily   Quantity:  18 mL   Refills:  3       insulin pen needle 32G X 4 MM   Commonly known as:  BD MARCK U/F   Used for:  Type 2 diabetes, HbA1c goal < 7% (H)        USE DAILY OR AS DIRECTED   Quantity:  300 each   Refills:  3       ketoconazole 2 % shampoo   Commonly known as:  NIZORAL        Apply topically daily as needed   Refills:  0       lidocaine 5 % ointment   Commonly known as:  XYLOCAINE   Used for:  Fibromyalgia, Rheumatoid arthritis involving both wrists with positive rheumatoid factor (H)        Dose:  1 inch   Apply 1 g topically 2 times daily as needed for moderate pain   Quantity:  50 g   Refills:  5       lisinopril-hydrochlorothiazide 20-25 MG per tablet   Commonly known as:  PRINZIDE/ZESTORETIC   Used for:  HTN (hypertension)        TAKE 1 TABLET BY MOUTH DAILY   Quantity:  90 tablet   Refills:  3       Magnesium Oxide -Mg Supplement 250 MG Tabs        TK 1 T PO BID. REDUCE IF STOOLS LOOSEN   Refills:  11       metFORMIN 500 MG 24 hr tablet   Commonly known as:  GLUCOPHAGE-XR   Used for:  Type 2 diabetes mellitus not at goal (H)        Dose:  1000 mg   Take 2 tablets (1,000 mg) by mouth daily (with dinner)   Quantity:  60 tablet   Refills:  11       metroNIDAZOLE 1 % cream   Commonly known as:  NORITATE        Apply topically daily   Refills:  0       mometasone 50 MCG/ACT spray   Commonly known as:  NASONEX        Dose:  2 spray   Spray 2 sprays into both nostrils daily   Refills:  0       montelukast 10 MG tablet   Commonly known as:  SINGULAIR   Used for:  Uncomplicated asthma, unspecified asthma severity        Dose:  10 mg   Take 1 tablet (10 mg) by mouth At Bedtime   Quantity:  30 tablet   Refills:  1       nitroGLYcerin 0.4 MG sublingual tablet   Commonly known as:  NITROSTAT   Used for:  NSTEMI (non-ST elevated myocardial infarction) (H)        Dose:  0.4 mg   Place 1 tablet (0.4 mg) under the tongue every 5 minutes as needed for chest pain   Quantity:  25  tablet   Refills:  1       nystatin 291446 UNITS Tabs tablet   Commonly known as:  MYCOSTATIN        TK 2 TS PO BID   Refills:  0       olopatadine 0.1 % ophthalmic solution   Commonly known as:  PATANOL   Used for:  Chronic rhinitis        Dose:  1 drop   Place 1 drop into both eyes 2 times daily as needed for allergies.   Quantity:  5 mL   Refills:  12       pravastatin 40 MG tablet   Commonly known as:  PRAVACHOL   Used for:  CAD (coronary artery disease)        Dose:  40 mg   Take 1 tablet (40 mg) by mouth daily   Quantity:  30 tablet   Refills:  11       ranitidine 150 MG tablet   Commonly known as:  ZANTAC   Used for:  Gastritis        Dose:  150 mg   Take 1 tablet (150 mg) by mouth 2 times daily   Quantity:  60 tablet   Refills:  2       spironolactone 25 MG tablet   Commonly known as:  ALDACTONE   Used for:  Hypertension goal BP (blood pressure) < 140/90        Dose:  50 mg   Take 2 tablets (50 mg) by mouth daily   Quantity:  60 tablet   Refills:  11       sucralfate 1 GM tablet   Commonly known as:  CARAFATE   Used for:  Abdominal pain, epigastric        Dose:  1 g   Take 1 tablet (1 g) by mouth 4 times daily as needed   Quantity:  120 tablet   Refills:  3       TOPROL XL PO        Dose:  100 mg   Take 100 mg by mouth daily   Refills:  0       traMADol 50 MG tablet   Commonly known as:  ULTRAM   Used for:  Undifferentiated connective tissue disease (H)        Dose:  50 mg   Take 1 tablet (50 mg) by mouth every 8 hours as needed for moderate pain   Quantity:  60 tablet   Refills:  3       TUMS 500 MG chewable tablet   Generic drug:  calcium carbonate        Dose:  3-4 chew tab   Take 3-4 chew tab by mouth daily as needed.   Refills:  0       ZOFRAN PO        Refills:  0       * Notice:  This list has 4 medication(s) that are the same as other medications prescribed for you. Read the directions carefully, and ask your doctor or other care provider to review them with you.         Where to get your medicines       These medications were sent to [facility] [place], [place] - [address]  [address], [place] [address]     Phone:  [phone]     bacitracin ophthalmic ointment         Some of these will need a paper prescription and others can be bought over the counter. Ask your nurse if you have questions.     Bring a paper prescription for each of these medications     HYDROcodone-acetaminophen 5-325 MG per tablet                Protect others around you: Learn how to safely use, store and throw away your medicines at www.disposemymeds.org.             Medication List: This is a list of all your medications and when to take them. Check marks below indicate your daily home schedule. Keep this list as a reference.      Medications           Morning Afternoon Evening Bedtime As Needed    acetaminophen 325 MG tablet   Commonly known as:  TYLENOL   Take 1-2 tablets (325-650 mg) by mouth every 6 hours as needed for pain (headache)                                AEROSPAN 80 MCG/ACT Aers oral inhaler   INHALE 2 PUFFS PO BID.  RINSE MOUTH AFTERWARDS   Generic drug:  flunisolide HFA                                * albuterol 108 (90 BASE) MCG/ACT Inhaler   Commonly known as:  PROAIR HFA/PROVENTIL HFA/VENTOLIN HFA   Inhale 2 puffs into the lungs every 6 hours as needed for shortness of breath / dyspnea or wheezing                                * albuterol (2.5 MG/3ML) 0.083% neb solution   INL 1 VIAL VIA NEBULIZATION Q 4 TO 6 HOURS PRN                                ASPIRIN LOW DOSE 81 MG EC tablet   TAKE 1 TABLET BY MOUTH EVERY DAY   Generic drug:  aspirin                                bacitracin ophthalmic ointment   Apply to incision line three times daily.   Last time this was given:  1 Tube on 8/4/2017 12:00 PM                                blood glucose monitoring lancets   Use to test blood sugars 4 times daily or as directed.                                blood glucose monitoring meter  device kit   Commonly known as:  no brand specified   Use to test blood sugar 4 X times daily or as directed.                                blood glucose monitoring test strip   Commonly known as:  no brand specified   1 strip by In Vitro route 4 times daily Test as directed. Please dispense three months and three months of refills. Thank you.                                clopidogrel 75 MG tablet   Commonly known as:  PLAVIX   Take 1 tablet (75 mg) by mouth daily                                * COMPRESSION STOCKINGS   2 each daily                                * COMPRESSION STOCKINGS   2 each daily Apply one 10-15 mmHg compression stocking to each lower extgmierty during the day and remove before bedtime.                                cyclobenzaprine 10 MG tablet   Commonly known as:  FLEXERIL   Take 1 tablet (10 mg) by mouth 2 times daily as needed for muscle spasms                                desonide 0.05 % cream   Commonly known as:  DESOWEN   Apply topically 2 times daily                                dicyclomine 20 MG tablet   Commonly known as:  BENTYL   Take 1 tablet (20 mg) by mouth 4 times daily as needed                                diphenhydrAMINE 25 MG capsule   Commonly known as:  BENADRYL   TK 1 TO 2 CS PO QHS                                EPIPEN 2-RIKY 0.3 MG/0.3ML injection 2-pack   INJECT 0.3 MG INTO THE MUSCLE PRF ANAPHYALAXIS   Generic drug:  EPINEPHrine                                ferrous gluconate 324 (38 FE) MG tablet   Commonly known as:  FERGON   Take 1 tablet (324 mg) by mouth 2 times daily                                fexofenadine 180 MG tablet   Commonly known as:  ALLEGRA   Take 1 tablet by mouth daily as needed.                                fluocinolone 0.01 % solution   Commonly known as:  SYNALAR   Apply topically daily as needed                                fluocinonide 0.05 % solution   Commonly known as:  LIDEX   Apply topically 2 times daily To areas of itch  on the scalp as needed.                                hydrALAZINE 25 MG tablet   Commonly known as:  APRESOLINE   Take 0.5 tablets (12.5 mg) by mouth 3 times daily as needed , for systolic blood pressure >140 mmHg.                                HYDROcodone-acetaminophen 5-325 MG per tablet   Commonly known as:  NORCO   Take 1 tablet by mouth every 6 hours as needed for pain Maximum of 4000 mg of acetaminophen in 24 hours.   Last time this was given:  1 tablet on 8/4/2017  2:14 PM   Notes to Patient:  You received 1 tablet at 1:35 pm.  You had a 2nd tablet at 2:15 pm                                hydroxychloroquine 200 MG tablet   Commonly known as:  PLAQUENIL   Take 2 tablets (400 mg) by mouth daily EYE EXAM DUE/LETTER SENT                                insulin glargine 100 UNIT/ML injection   Inject 40 Units Subcutaneous daily                                insulin pen needle 32G X 4 MM   Commonly known as:  BD MARCK U/F   USE DAILY OR AS DIRECTED                                ketoconazole 2 % shampoo   Commonly known as:  NIZORAL   Apply topically daily as needed                                lidocaine 5 % ointment   Commonly known as:  XYLOCAINE   Apply 1 g topically 2 times daily as needed for moderate pain                                lisinopril-hydrochlorothiazide 20-25 MG per tablet   Commonly known as:  PRINZIDE/ZESTORETIC   TAKE 1 TABLET BY MOUTH DAILY                                Magnesium Oxide -Mg Supplement 250 MG Tabs   TK 1 T PO BID. REDUCE IF STOOLS LOOSEN                                metFORMIN 500 MG 24 hr tablet   Commonly known as:  GLUCOPHAGE-XR   Take 2 tablets (1,000 mg) by mouth daily (with dinner)                                metroNIDAZOLE 1 % cream   Commonly known as:  NORITATE   Apply topically daily                                mometasone 50 MCG/ACT spray   Commonly known as:  NASONEX   Spray 2 sprays into both nostrils daily                                montelukast 10 MG  tablet   Commonly known as:  SINGULAIR   Take 1 tablet (10 mg) by mouth At Bedtime                                nitroGLYcerin 0.4 MG sublingual tablet   Commonly known as:  NITROSTAT   Place 1 tablet (0.4 mg) under the tongue every 5 minutes as needed for chest pain                                nystatin 173669 UNITS Tabs tablet   Commonly known as:  MYCOSTATIN   TK 2 TS PO BID                                olopatadine 0.1 % ophthalmic solution   Commonly known as:  PATANOL   Place 1 drop into both eyes 2 times daily as needed for allergies.                                pravastatin 40 MG tablet   Commonly known as:  PRAVACHOL   Take 1 tablet (40 mg) by mouth daily                                ranitidine 150 MG tablet   Commonly known as:  ZANTAC   Take 1 tablet (150 mg) by mouth 2 times daily                                spironolactone 25 MG tablet   Commonly known as:  ALDACTONE   Take 2 tablets (50 mg) by mouth daily                                sucralfate 1 GM tablet   Commonly known as:  CARAFATE   Take 1 tablet (1 g) by mouth 4 times daily as needed                                TOPROL XL PO   Take 100 mg by mouth daily                                traMADol 50 MG tablet   Commonly known as:  ULTRAM   Take 1 tablet (50 mg) by mouth every 8 hours as needed for moderate pain                                TUMS 500 MG chewable tablet   Take 3-4 chew tab by mouth daily as needed.   Generic drug:  calcium carbonate                                vitamin D 04108 UNIT capsule   Commonly known as:  ERGOCALCIFEROL   Take 1 capsule (50,000 Units) by mouth every 7 days Need a Vitamin D level in 2 months.                                SONJA PO                                * Notice:  This list has 4 medication(s) that are the same as other medications prescribed for you. Read the directions carefully, and ask your doctor or other care provider to review them with you.       PA/NP only visit

## 2023-05-25 ENCOUNTER — ANESTHESIA EVENT (OUTPATIENT)
Dept: SURGERY | Facility: AMBULATORY SURGERY CENTER | Age: 58
End: 2023-05-25
Payer: COMMERCIAL

## 2023-05-25 ENCOUNTER — HOSPITAL ENCOUNTER (OUTPATIENT)
Facility: AMBULATORY SURGERY CENTER | Age: 58
Discharge: HOME | End: 2023-05-25
Attending: INTERNAL MEDICINE
Payer: COMMERCIAL

## 2023-05-25 ENCOUNTER — ANESTHESIA (OUTPATIENT)
Dept: SURGERY | Facility: AMBULATORY SURGERY CENTER | Age: 58
End: 2023-05-25
Payer: COMMERCIAL

## 2023-05-25 VITALS
TEMPERATURE: 97.5 F | HEART RATE: 97 BPM | BODY MASS INDEX: 31.47 KG/M2 | OXYGEN SATURATION: 100 % | SYSTOLIC BLOOD PRESSURE: 115 MMHG | WEIGHT: 171 LBS | HEIGHT: 62 IN | DIASTOLIC BLOOD PRESSURE: 61 MMHG | RESPIRATION RATE: 12 BRPM

## 2023-05-25 VITALS — HEART RATE: 90 BPM

## 2023-05-25 DIAGNOSIS — R10.13 ABDOMINAL PAIN, EPIGASTRIC: ICD-10-CM

## 2023-05-25 DIAGNOSIS — Z12.11 ENCOUNTER FOR SCREENING COLONOSCOPY: Primary | ICD-10-CM

## 2023-05-25 LAB
COLONOSCOPY: NORMAL
GLUCOSE BLDC GLUCOMTR-MCNC: 248 MG/DL (ref 70–99)
GLUCOSE BLDC GLUCOMTR-MCNC: 255 MG/DL (ref 70–99)
UPPER GI ENDOSCOPY: NORMAL

## 2023-05-25 PROCEDURE — 45380 COLONOSCOPY AND BIOPSY: CPT | Mod: 33

## 2023-05-25 PROCEDURE — 88305 TISSUE EXAM BY PATHOLOGIST: CPT | Mod: 26 | Performed by: STUDENT IN AN ORGANIZED HEALTH CARE EDUCATION/TRAINING PROGRAM

## 2023-05-25 PROCEDURE — 82962 GLUCOSE BLOOD TEST: CPT | Performed by: PATHOLOGY

## 2023-05-25 PROCEDURE — 43239 EGD BIOPSY SINGLE/MULTIPLE: CPT

## 2023-05-25 PROCEDURE — 88305 TISSUE EXAM BY PATHOLOGIST: CPT | Mod: TC | Performed by: INTERNAL MEDICINE

## 2023-05-25 RX ORDER — LIDOCAINE 40 MG/G
CREAM TOPICAL
Status: DISCONTINUED | OUTPATIENT
Start: 2023-05-25 | End: 2023-05-26 | Stop reason: HOSPADM

## 2023-05-25 RX ORDER — ONDANSETRON 2 MG/ML
4 INJECTION INTRAMUSCULAR; INTRAVENOUS
Status: COMPLETED | OUTPATIENT
Start: 2023-05-25 | End: 2023-05-25

## 2023-05-25 RX ORDER — SODIUM CHLORIDE, SODIUM LACTATE, POTASSIUM CHLORIDE, CALCIUM CHLORIDE 600; 310; 30; 20 MG/100ML; MG/100ML; MG/100ML; MG/100ML
INJECTION, SOLUTION INTRAVENOUS CONTINUOUS PRN
Status: DISCONTINUED | OUTPATIENT
Start: 2023-05-25 | End: 2023-05-25

## 2023-05-25 RX ORDER — PROPOFOL 10 MG/ML
INJECTION, EMULSION INTRAVENOUS CONTINUOUS PRN
Status: DISCONTINUED | OUTPATIENT
Start: 2023-05-25 | End: 2023-05-25

## 2023-05-25 RX ORDER — GLYCOPYRROLATE 0.2 MG/ML
INJECTION, SOLUTION INTRAMUSCULAR; INTRAVENOUS PRN
Status: DISCONTINUED | OUTPATIENT
Start: 2023-05-25 | End: 2023-05-25

## 2023-05-25 RX ORDER — LIDOCAINE HYDROCHLORIDE 20 MG/ML
INJECTION, SOLUTION INFILTRATION; PERINEURAL PRN
Status: DISCONTINUED | OUTPATIENT
Start: 2023-05-25 | End: 2023-05-25

## 2023-05-25 RX ORDER — PROPOFOL 10 MG/ML
INJECTION, EMULSION INTRAVENOUS PRN
Status: DISCONTINUED | OUTPATIENT
Start: 2023-05-25 | End: 2023-05-25

## 2023-05-25 RX ADMIN — PROPOFOL 70 MG: 10 INJECTION, EMULSION INTRAVENOUS at 10:54

## 2023-05-25 RX ADMIN — SODIUM CHLORIDE, SODIUM LACTATE, POTASSIUM CHLORIDE, CALCIUM CHLORIDE: 600; 310; 30; 20 INJECTION, SOLUTION INTRAVENOUS at 09:55

## 2023-05-25 RX ADMIN — GLYCOPYRROLATE 0.2 MG: 0.2 INJECTION, SOLUTION INTRAMUSCULAR; INTRAVENOUS at 10:54

## 2023-05-25 RX ADMIN — ONDANSETRON 4 MG: 2 INJECTION INTRAMUSCULAR; INTRAVENOUS at 10:10

## 2023-05-25 RX ADMIN — LIDOCAINE HYDROCHLORIDE 80 MG: 20 INJECTION, SOLUTION INFILTRATION; PERINEURAL at 10:54

## 2023-05-25 RX ADMIN — PROPOFOL 200 MCG/KG/MIN: 10 INJECTION, EMULSION INTRAVENOUS at 10:54

## 2023-05-25 NOTE — ANESTHESIA CARE TRANSFER NOTE
Patient: Janine Cornell    Procedure: Procedure(s):  COLONOSCOPY, WITH POLYPECTOMY  ESOPHAGOGASTRODUODENOSCOPY, WITH BIOPSY       Diagnosis: Rectal prolapse [K62.3]  Diagnosis Additional Information: No value filed.    Anesthesia Type:   MAC     Note:    Oropharynx: oropharynx clear of all foreign objects  Level of Consciousness: awake  Oxygen Supplementation: room air    Independent Airway: airway patency satisfactory and stable  Dentition: dentition unchanged  Vital Signs Stable: post-procedure vital signs reviewed and stable    Patient transferred to: Phase II  Comments: VSS and WNL, comfortable, no PONV, report to Oli RN  Handoff Report: Identifed the Patient, Identified the Reponsible Provider, Reviewed the pertinent medical history, Discussed the surgical course, Reviewed Intra-OP anesthesia mangement and issues during anesthesia, Set expectations for post-procedure period and Allowed opportunity for questions and acknowledgement of understanding      Vitals:  Vitals Value Taken Time   BP     Temp     Pulse     Resp     SpO2         Electronically Signed By: KAYLYNN Higgins CRNA  May 25, 2023  11:53 AM   Diabetic Medication Protocol Passed 03/05/2023 09:03 AM   Protocol Details  HgBA1C procedure resulted in past 6 months    Last HgBA1C < 7.5    Microalbumin procedure in past 12 months or taking ACE/ARB    Appointment in past 6 or next 3 months        LOV 10/8/22     LAST LAB  9/30/22     LAST RX  12/5/22 90     Next OV No future appointments.       PROTOCOL pass

## 2023-05-25 NOTE — INTERVAL H&P NOTE
"I have reviewed the surgical (or preoperative) H&P that is linked to this encounter, and examined the patient. There are no significant changes    Clinical Conditions Present on Arrival:  Clinically Significant Risk Factors Present on Admission                 # Drug Induced Platelet Defect: home medication list includes an antiplatelet medication  # Obesity: Estimated body mass index is 31.28 kg/m  as calculated from the following:    Height as of this encounter: 1.575 m (5' 2\").    Weight as of this encounter: 77.6 kg (171 lb).       "

## 2023-05-25 NOTE — ANESTHESIA POSTPROCEDURE EVALUATION
Patient: Janine Cornell    Procedure: Procedure(s):  COLONOSCOPY, WITH POLYPECTOMY  ESOPHAGOGASTRODUODENOSCOPY, WITH BIOPSY       Anesthesia Type:  MAC    Note:  Disposition: Outpatient   Postop Pain Control: Uneventful            Sign Out: Well controlled pain   PONV: No   Neuro/Psych: Uneventful            Sign Out: Acceptable/Baseline neuro status   Airway/Respiratory: Uneventful            Sign Out: Acceptable/Baseline resp. status   CV/Hemodynamics: Uneventful            Sign Out: Acceptable CV status; No obvious hypovolemia; No obvious fluid overload   Other NRE: NONE   DID A NON-ROUTINE EVENT OCCUR? No           Last vitals:  Vitals Value Taken Time   /61 05/25/23 1215   Temp 36.4  C (97.5  F) 05/25/23 1215   Pulse 97 05/25/23 1215   Resp 12 05/25/23 1215   SpO2 100 % 05/25/23 1215       Electronically Signed By: Hector Gray MD, MD  May 25, 2023  1:58 PM

## 2023-05-25 NOTE — LETTER
May 30, 2023      Janine Cornell  [address] [facility] [address]        Dear ,    We are writing to inform you of your test results.  The biopsy tissue taken from your stomach showed chemical irritation.  This kind of pattern is often seen with aspirin and related medications.  It can also be caused by alcohol and natural irritants like bile.  We do not know whether this finding is at all related to any of your symptoms.     The results have been forwarded to your primary physician.  Please discuss with him or her whether any treatment or modification of your current medications is appropriate.     If you have any further questions, please feel free to make an appointment with our gastroenterology clinic.     Resulted Orders   Surgical Pathology Exam   Result Value Ref Range    Case Report       Surgical Pathology Report                         Case: [id number]                                  Authorizing Provider:  Percy Gross Collected:           05/25/2023 10:59 AM                                 MD DENA                                                                        Ordering Location:     [facility] OR  Received:            05/25/2023 11:53 AM                                 [place]                                                                  Pathologist:           Britni Simons MD                                                          Specimens:   A) - Other, Gastric Biopsy                                                                          B) - Large Intestine, Colon, Ascending Colon Polyp                                         Final Diagnosis       A. GASTRIC BIOPSY:  - Gastric mucosa with mild chronic inactive gastritis  - No H. pylori-like organisms identified on routine staining  - Negative for intestinal metaplasia or dysplasia    B. ASCENDING COLON, POLYP, POLYPECTOMY:  - Unremarkable colonic mucosa  - Negative for neoplastic polyp         "Clinical Information       Patient is a 57-year-old female with a history of iron deficiency anemia complaining of heartburn.        Gross Description       A(1). Other, Gastric Biopsy:  The specimen is received in formalin with proper patient identification, labeled \"gastric biopsy\".  The specimen consists of 3 irregular tan pieces of soft tissue ranging from 0.1-0.3 cm in greatest dimension which are entirely submitted in cassette A1.  B(2). Large Intestine, Colon, Ascending Colon Polyp:  The specimen is received in formalin with proper patient identification, labeled \"ascending colon polyp\".  The specimen consists of 2 irregular tan pieces of soft tissue, 0.2 cm and 0.3 cm in greatest dimension which are entirely submitted in cassette B1.      Microscopic Description       Microscopic examination was performed.       Performing Labs       The technical component of this testing was completed at [facility]        Case Images         If you have any questions or concerns, please call the clinic at the number listed above.       Sincerely,      Percy Gross MD            "

## 2023-05-25 NOTE — ANESTHESIA PREPROCEDURE EVALUATION
Anesthesia Pre-Procedure Evaluation    Patient: Janine Cornell   MRN: [id number] : 1965        Procedure : Procedure(s):  Colonoscopy  Esophagoscopy, gastroscopy, duodenoscopy (EGD), combined          Past Medical History:   Diagnosis Date     Abnormal glandular Papanicolaou smear of cervix      Allergic rhinitis     Allergy, airborne subst     Arthritis      ASCVD (arteriosclerotic cardiovascular disease)      Chronic pain      Chronic pancreatitis (H)     idiopathic, Tx: PPI, antioxidants, pancreatic enzymes     Common migraine      Coronary artery disease      Costochondritis      Difficulty in walking(719.7)      Dyspnea on exertion      Ectasia, mammary duct     [facility], persistent nipple discharge     Elevated fasting glucose      Gastro-oesophageal reflux disease      Granulomatosis with polyangiitis (H)      Hernia      History of angina      Hyperlipidemia LDL goal < 100      Hypertension goal BP (blood pressure) < 140/90     Essential hypertension     Iron deficiency anemia      Ischemic cardiomyopathy      Menorrhagia      Migraine headaches      Mild persistent asthma      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 Matias, neg w/u, Rx'd w/prednisone     Ventral hernia, unspecified, without mention of obstruction or gangrene  2012      Past Surgical History:   Procedure Laterality Date     C/SECTION, LOW TRANSVERSE  1996         CARDIAC SURGERY      cardiac stent- recent in 16; 4 other stents     DILATION AND CURETTAGE N/A 2016    Procedure: DILATION AND CURETTAGE;  Surgeon: Nahed Butler MD;  Location: [facility]     ENDOMETRIAL SAMPLING (BIOPSY) N/A 2023    Procedure: ENDOMETRIAL BIOPSY;  Surgeon: Nahed Butler MD;  Location: [facility]     ESOPHAGOSCOPY, GASTROSCOPY, DUODENOSCOPY (EGD), COMBINED N/A 2022    Procedure: ESOPHAGOGASTRODUODENOSCOPY, WITH BIOPSY;  Surgeon: Enzo Sesay MD;  [facility]     EXAM UNDER ANESTHESIA PELVIC N/A 2023    Procedure: EXAM UNDER ANESTHESIA;  Surgeon: Nahed Butler MD;  Location: [facility] OR     HC UGI ENDOSCOPY W EUS  2008    Dr. Pastrana, possible chronic pancreatitis, fatty liver     HERNIA REPAIR  2012    done at [facility]     INSERT INTRAUTERINE DEVICE N/A 2016    Procedure: INSERT INTRAUTERINE DEVICE;  Surgeon: Nahed Butler MD;  [facility]     INT UTERINE FIBRIOD EMBOLIZATION  10/29/2014     LAPAROSCOPIC CHOLECYSTECTOMY  2008    Dr. Arnol GRUBBS TX, CERVICAL  1992    s/p HUMERA, done at [facility] in [place].     ORBITOTOMY Right 03/15/2016    Procedure: ORBITOTOMY;  Surgeon: Myron Cyr MD;  Location: [facility]     ORBITOTOMY Right 2017    Procedure: ORBITOTOMY;  RIGHT ORBITOTOMY AND BIOPSY;  Surgeon: Charis Holbrook MD;  Location: [facility]     REMOVE INTRAUTERINE DEVICE N/A 2023    Procedure: Remove intrauterine device,;  Surgeon: Nahed Butler MD;  [facility]: [facility]     REPAIR PTOSIS Right 2017    Procedure: REPAIR PTOSIS;  RIGHT UPPER LID PTOSIS REPAIR;  Surgeon: Myron Cyr MD;  [facility]     UPPER GI ENDOSCOPY  10/21/2008    mild gastritis, Dr. Rocky CALDERA ECHO HEART XTHORACIC,COMPLETE, W/O DOPPLER  2004    [facility] WNL, EF 60%       Allergies   Allergen Reactions     Amoxicillin-Pot Clavulanate      Unknown possible hives and edema     Amoxicillin-Pot Clavulanate      Artificial Sweetner (Informational Only)  Other (See Comments)     Increased headache     Azithromycin      Clavulanic Acid      Diatrizoate Other (See Comments)     Pt wants this listed because she is allergic to shellfish      Imitrex [Triptans]      Severe face/neck/chest tightness and flushing side effects      Penicillins Hives     Unknown      Pork Allergy      Stomach pain, cramping, diarrhea, itching, nausea and headaches     Shellfish Allergy Hives and Swelling     Shellfish-Derived Products      Sulfa Antibiotics Hives and Swelling     Sulfatolamide      Zithromax [Azithromycin Dihydrate] Swelling     Unknown       Social History     Tobacco Use     Smoking status: Former     Packs/day: 0.20     Years: 1.00     Pack years: 0.20     Types: Cigarettes     Quit date: 2016     Years since quittin.3     Smokeless tobacco: Never   Vaping Use     Vaping status: Not on file   Substance Use Topics     Alcohol use: No     Alcohol/week: 0.0 standard drinks of alcohol      Wt Readings from Last 1 Encounters:   23 77.6 kg (171 lb)        Anesthesia Evaluation            ROS/MED HX  ENT/Pulmonary:     (+) Intermittent, asthma Treatment: Inhaler prn,      Neurologic:     (+) CVA, TIA,     Cardiovascular:     (+) hypertension--CAD angina-with excertion. --ROBERTS.     METS/Exercise Tolerance:     Hematologic:       Musculoskeletal:   (+) arthritis,     GI/Hepatic:     (+) GERD, Asymptomatic on medication, esophageal disease,     Renal/Genitourinary:       Endo:     (+) type II DM, Using insulin, - not using insulin pump. Obesity,     Psychiatric/Substance Use:     (+) psychiatric history anxiety and schizophrenia     Infectious Disease:       Malignancy:       Other:               OUTSIDE LABS:  CBC:   Lab Results   Component Value Date    WBC 14.2 (H) 2023    WBC 13.6  (H) 03/09/2023    HGB 12.9 04/28/2023    HGB 14.3 04/12/2023    HCT 43.7 04/12/2023    HCT 40.9 03/09/2023     04/12/2023     03/09/2023     BMP:   Lab Results   Component Value Date     02/10/2023     01/19/2023    POTASSIUM 4.1 02/10/2023    POTASSIUM 4.0 01/19/2023    CHLORIDE 99 02/10/2023    CHLORIDE 98 01/19/2023    CO2 23 02/10/2023    CO2 25 01/19/2023    BUN 24.6 (H) 02/10/2023    BUN 23.6 (H) 01/19/2023    CR 1.07 (H) 02/10/2023    CR 1.09 (H) 01/19/2023     (H) 05/25/2023    GLC 86 04/28/2023     COAGS:   Lab Results   Component Value Date    PTT 30 08/25/2016    INR 0.97 08/25/2016     POC:   Lab Results   Component Value Date     (H) 05/28/2021    HCG Negative 05/24/2021    HCGS Negative 08/04/2017     HEPATIC:   Lab Results   Component Value Date    ALBUMIN 4.8 02/10/2023    PROTTOTAL 7.6 02/10/2023    ALT 30 02/10/2023    AST 26 02/10/2023    ALKPHOS 86 02/10/2023    BILITOTAL 0.2 02/10/2023     OTHER:   Lab Results   Component Value Date    LACT 1.2 05/24/2021    A1C 8.5 (H) 11/23/2022    KATHY 10.5 (H) 02/10/2023    PHOS 3.3 05/28/2021    MAG 1.9 05/28/2021    LIPASE 26 02/10/2023    AMYLASE 60 01/27/2016    TSH 0.40 01/19/2023    T4 1.27 02/04/2022    T3 117 02/08/2022    CRP 9.1 (H) 08/19/2022    SED 12 02/10/2023       Anesthesia Plan    ASA Status:  3      Anesthesia Type: MAC.     - Reason for MAC: immobility needed, straight local not clinically adequate              Consents    Anesthesia Plan(s) and associated risks, benefits, and realistic alternatives discussed. Questions answered and patient/representative(s) expressed understanding.     - Discussed: Risks, Benefits and Alternatives for BOTH SEDATION and the PROCEDURE were discussed     - Discussed with:  Patient      - Extended Intubation/Ventilatory Support Discussed: No.      - Patient is DNR/DNI Status: No    Use of blood products discussed: No .     Postoperative Care    Pain management: IV  analgesics, Oral pain medications.   PONV prophylaxis: Ondansetron (or other 5HT-3), Dexamethasone or Solumedrol     Comments:                Hector Gray MD, MD

## 2023-05-25 NOTE — H&P
Janine Cornell  [id number]  female  57 year old      Reason for procedure/surgery: GERD/Screening colonoscopy.    Patient Active Problem List   Diagnosis     Seasonal affective disorder (H)     Allergic rhinitis     PCOS (polycystic ovarian syndrome)     Moderate persistent asthma     Chronic pancreatitis (H)     Hypertension goal BP (blood pressure) < 140/90     Common migraine     NSTEMI (non-ST elevated myocardial infarction) (H)     Hyperlipidemia LDL goal <70     ASCVD (arteriosclerotic cardiovascular disease)     GERD (gastroesophageal reflux disease)     Ischemic cardiomyopathy     Hypertensive heart disease     Uterine leiomyoma     Iron deficiency anemia     Costochondritis     Vitamin D deficiency     Breast cancer screening     Spinal stenosis in cervical region     Fibromyalgia     Seronegative rheumatoid arthritis (H)     Type 2 diabetes, HbA1C goal < 8% (H)     Type 2 diabetes mellitus with other specified complication (H)     Hyperlipidemia associated with type 2 diabetes mellitus (H)     Hypertension associated with diabetes (H)     Overweight with body mass index (BMI) 25.0-29.9     Tobacco use disorder     Telogen effluvium     CAD S/P percutaneous coronary angioplasty     Status post coronary angiogram     ANCA-associated vasculitis (H)     Wegener's vasculitis     Type 1 diabetes mellitus with complications (H)     Chest discomfort     Urinary frequency     Abdominal pain, epigastric     Hypokalemia     Leukocytosis, unspecified type     Acute pancreatitis, unspecified complication status, unspecified pancreatitis type       Past Surgical History:    Past Surgical History:   Procedure Laterality Date     C/SECTION, LOW TRANSVERSE  1996         CARDIAC SURGERY      cardiac stent- recent in 16; 4 other stents     DILATION AND CURETTAGE N/A 2016    Procedure: DILATION AND CURETTAGE;  Surgeon: Nahed Butler MD;  [facility]     ENDOMETRIAL SAMPLING (BIOPSY) N/A  4/28/2023    Procedure: ENDOMETRIAL BIOPSY;  Surgeon: Nahed Butler MD;  [facility]     ESOPHAGOSCOPY, GASTROSCOPY, DUODENOSCOPY (EGD), COMBINED N/A 03/31/2022    Procedure: ESOPHAGOGASTRODUODENOSCOPY, WITH BIOPSY;  Surgeon: Enzo Sesay MD;  [facility]     EXAM UNDER ANESTHESIA PELVIC N/A 4/28/2023    Procedure: EXAM UNDER ANESTHESIA;  Surgeon: Nahed Butler MD;  [facility]     HC UGI ENDOSCOPY W EUS  11/07/2008    Dr. Pastrana, possible chronic pancreatitis, fatty liver     HERNIA REPAIR  12/01/2012    done at [facility] [place]     INSERT INTRAUTERINE DEVICE N/A 07/06/2016    Procedure: INSERT INTRAUTERINE DEVICE;  Surgeon: Nahed Butler MD;  [facility]     INT UTERINE FIBRIOD EMBOLIZATION  10/29/2014     LAPAROSCOPIC CHOLECYSTECTOMY  05/28/2008    Dr. Arnol GRUBBS TX, CERVICAL  1992    s/p LEEP, done at [facility] in [place].     ORBITOTOMY Right 03/15/2016    Procedure: ORBITOTOMY;  Surgeon: Myorn Cyr MD;  Location: [facility]     ORBITOTOMY Right 08/04/2017    Procedure: ORBITOTOMY;  RIGHT ORBITOTOMY AND BIOPSY;  Surgeon: Charis Holbrook MD;  Location: [facility]     REMOVE INTRAUTERINE DEVICE N/A 4/28/2023    Procedure: Remove intrauterine device,;  Surgeon: Nahed Butler MD;  [facility]     REPAIR PTOSIS Right 11/17/2017    Procedure: REPAIR PTOSIS;  RIGHT UPPER LID PTOSIS REPAIR;  Surgeon: Myron Cyr MD;  Location: [facility]     UPPER GI ENDOSCOPY  10/21/2008    mild gastritis, Dr. Rocky CALDERA ECHO HEART XTHORACIC,COMPLETE, W/O DOPPLER  02/04/2004    [facility] WNL, EF 60%       Past Medical History:   Past Medical History:   Diagnosis Date     Abnormal glandular Papanicolaou smear of cervix 1992     Allergic rhinitis     Allergy, airborne subst     Arthritis      ASCVD (arteriosclerotic cardiovascular disease)      Chronic pain      Chronic pancreatitis (H)     idiopathic, Tx: PPI, antioxidants, pancreatic enzymes     Common  migraine      Coronary artery disease      Costochondritis      Difficulty in walking(719.7)      Dyspnea on exertion      Ectasia, mammary duct     [facility], [facility]     Elevated fasting glucose      Gastro-oesophageal reflux disease      Granulomatosis with polyangiitis (H)      Hernia      History of angina      Hyperlipidemia LDL goal < 100      Hypertension goal BP (blood pressure) < 140/90     Essential hypertension     Iron deficiency anemia      Ischemic cardiomyopathy      Menorrhagia      Migraine headaches      Mild persistent asthma      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 Matias, neg w/u, Rx'd w/prednisone     Ventral hernia, unspecified, without mention of obstruction or gangrene 2012       Social History:   Social History     Tobacco Use     Smoking status: Former     Packs/day: 0.20     Years: 1.00     Pack years: 0.20     Types: Cigarettes     Quit date: 2016     Years since quittin.3     Smokeless tobacco: Never   Vaping Use     Vaping status: Not on file   Substance Use Topics     Alcohol use: No     Alcohol/week: 0.0 standard drinks of alcohol       Family History:   Family History   Problem Relation Age of Onset     Hypertension Mother      Arthritis Mother      Heart Disease Mother          long qt syndrome     Thyroid Disease Mother      C.A.D. Mother      Heart Disease Father 50        heart attack     Cerebrovascular Disease Father      Diabetes Father      Hypertension Father      Depression Father      C.A.D. Father      Neurologic Disorder Sister         migraines     Neurologic Disorder Sister         migraines     Heart Disease Brother 15        MI at 15, 16.      Arthritis Brother         he passed away, had severe arthritis at age 11     C.A.D. Brother      Anesthesia Reaction Son         PONV     Respiratory Son         asthma     Hypertension Maternal Aunt      Diabetes Maternal Uncle      Hypertension Maternal Uncle      Arthritis Maternal Uncle      Eye Disorder Maternal Uncle         cataracts     Cerebrovascular Disease Maternal Uncle      Family History Negative Other         neg for RA, SLE     Unknown/Adopted No family hx of         unknown neurological issues in her family, mother was adopted     Skin Cancer No family hx of         No known family hx of skin cancer     Deep Vein Thrombosis (DVT) No family hx of        Allergies:   Allergies   Allergen Reactions     Amoxicillin-Pot Clavulanate      Unknown possible hives and edema     Amoxicillin-Pot Clavulanate      Artificial Sweetner (Informational Only)  Other (See Comments)     Increased headache     Azithromycin      Clavulanic Acid      Diatrizoate Other (See Comments)     Pt wants this listed because she is allergic to shellfish      Imitrex [Triptans]      Severe face/neck/chest tightness and flushing side effects      Penicillins Hives     Unknown      Pork Allergy      Stomach pain, cramping, diarrhea, itching, nausea and headaches     Shellfish Allergy Hives and Swelling     Shellfish-Derived Products      Sulfa Antibiotics Hives and Swelling     Sulfatolamide      Zithromax [Azithromycin Dihydrate] Swelling     Unknown        Active Medications:   Current Outpatient Medications   Medication Sig Dispense Refill      acetaminophen (TYLENOL) 325 MG tablet Take 1-2 tablets (325-650 mg) by mouth every 6 hours as needed for pain (headache) 250 tablet 4     ADVAIR DISKUS 250-50 MCG/ACT inhaler Inhale 1 puff into the lungs 2 times daily       albuterol (2.5 MG/3ML) 0.083% neb solution INL 1 VIAL VIA NEBULIZATION Q 4 TO 6 HOURS PRN  1     albuterol (PROAIR HFA, PROVENTIL HFA, VENTOLIN HFA) 108 (90 BASE) MCG/ACT inhaler Inhale 2 puffs into the lungs every 6 hours as needed for shortness of breath / dyspnea or wheezing 3 Inhaler 1     BANOPHEN 25 MG tablet Take 25 mg by mouth At Bedtime       bisacodyl (DULCOLAX) 5 MG EC tablet Two days prior to exam take two (2) tablets at 4pm. One day prior to exam take two (2) tablets at 4pm 4 tablet 0     blood glucose (NO BRAND SPECIFIED) lancets standard Use to test blood sugar 1-4 times daily or as directed. 400 each 3     blood glucose (NO BRAND SPECIFIED) test strip Use to test blood sugar 1-4 times daily or as directed. 400 strip 3     Blood Pressure Monitor KIT 1 each daily Monitor home blood pressure as instructed by physician.  Dispense [facility] blood pressure kit. 1 kit 0     calcium carbonate (TUMS) 500 MG chewable tablet Take 3-4 tablets (1,500-2,000 mg) by mouth daily as needed 90 tablet 3     clopidogrel (PLAVIX) 75 MG tablet Take 1 tablet (75 mg) by mouth daily . 30-day refills only. (Patient taking differently: Take 75 mg by mouth At Bedtime . 30-day refills only.) 30 tablet 11     COMPRESSION STOCKINGS 2 each daily Apply one 10-15 mmHg compression stocking to each lower extgmierty during the day and remove before bedtime. 4 each 2     Continuous Blood Gluc Sensor (FREESTYLE TO 2 SENSOR) [facility] Inject 1 each Subcutaneous every 14 days Use 1 sensor every 14 days. Use to read blood sugars per 's instructions. 2 each 5     cyclobenzaprine (FLEXERIL) 10 MG tablet Take 1 tablet (10 mg) by mouth 2 times daily as needed for muscle spasms 60 tablet 3     dicyclomine (BENTYL) 20  MG tablet TAKE 1 TABLET(20 MG) BY MOUTH FOUR TIMES DAILY AS NEEDED. 60 tablet 0     empagliflozin (JARDIANCE) 10 MG TABS tablet Take 2 tablets (20 mg) by mouth daily (Patient taking differently: Take 20 mg by mouth At Bedtime) 90 tablet 3     EPIPEN 2-RIKY 0.3 MG/0.3ML injection INJECT 0.3 MG INTO THE MUSCLE PRF ANAPHYALAXIS  0     esomeprazole (NEXIUM) 40 MG DR capsule Take 1 capsule (40 mg) by mouth 2 times daily Take 30-60 minutes before eating. (Patient taking differently: Take 40 mg by mouth as needed Take 30-60 minutes before eating.) 180 capsule 0     estradiol (VAGIFEM) 10 MCG TABS vaginal tablet Place 1 tablet (10 mcg) vaginally twice a week 24 tablet 3     famotidine (PEPCID) 20 MG tablet Take 1 tablet (20 mg) by mouth 2 times daily as needed (abdominal pain) 20 tablet 0     fexofenadine (ALLEGRA) 180 MG tablet Take 1 tablet by mouth daily as needed. 90 tablet 3     fluticasone (FLONASE) 50 MCG/ACT nasal spray Spray 1 spray in nostril as needed       folic acid (FOLVITE) 1 MG tablet Take 1 tablet (1 mg) by mouth daily (Patient taking differently: Take 1 mg by mouth At Bedtime) 90 tablet 3     hydrocortisone (CORTAID) 1 % external cream Apply topically 2 times daily (Patient taking differently: Apply topically as needed) 60 g 1     insulin glargine (LANTUS PEN) 100 UNIT/ML pen Inject 65 Units Subcutaneous every morning Or as directed. 75 mL 3     insulin pen needle (BD PEN NEEDLE MARCK 2ND GEN) 32G X 4 MM miscellaneous Use one pen needle daily or as directed. 100 each 3     ketoconazole (NIZORAL) 2 % shampoo Apply topically daily as needed       levocetirizine (XYZAL) 5 MG tablet Take 5 mg by mouth as needed       levonorgestrel (MIRENA) 20 MCG/DAY IUD 1 each by Intrauterine route once       lisinopril-hydrochlorothiazide (ZESTORETIC) 20-25 MG tablet Take 1 tablet by mouth daily . 30-day refills only. (Patient taking differently: Take 1 tablet by mouth At Bedtime . 30-day refills only.) 30 tablet 11      magnesium 250 MG tablet TAKE 1 TABLET(250 MG) BY MOUTH TWICE DAILY (Patient taking differently: Take 1 tablet by mouth At Bedtime) 60 tablet 3     metFORMIN (GLUCOPHAGE XR) 500 MG 24 hr tablet Take 4 tablets (2,000 mg) by mouth daily (with dinner) (Patient taking differently: Take 2,000 mg by mouth At Bedtime) 360 tablet 3     metoprolol succinate ER (TOPROL XL) 100 MG 24 hr tablet Take 1 tablet (100 mg) by mouth daily . 30-day refills only. (Patient taking differently: Take 100 mg by mouth At Bedtime . 30-day refills only.) 30 tablet 11     Multiple Vitamin (TAB-A-GERALD) TABS Take 1 tablet by mouth daily (Patient taking differently: Take 1 tablet by mouth At Bedtime) 90 tablet 1     nitroGLYcerin (NITROSTAT) 0.4 MG sublingual tablet Place 1 tablet (0.4 mg) under the tongue every 5 minutes as needed for chest pain . After 3 doses, if pain persists call 911. 25 tablet 3     olopatadine (PATANOL) 0.1 % ophthalmic solution Place 1 drop into both eyes as needed       polyethylene glycol (GOLYTELY) 236 g suspension Take as directed. Two days before your exam fill the first container with water. Cover and shake until mixed well. At 5:00pm drink one 8oz glass every 10-15 minutes until half (1/2) of the first container is empty. Store the remainder in the refrigerator. One day before your exam at 5:00pm drink the second half of the first container until it is gone. Before you go to bed mix the second container with water and put in refrigerator. Six hours before your check in time drink one 8oz glass every 10-15 minutes until half of container is empty. Discard the remainder of solution. 8000 mL 0     pravastatin (PRAVACHOL) 40 MG tablet Take 1 tablet (40 mg) by mouth daily . 30-day refills only. (Patient taking differently: Take 40 mg by mouth At Bedtime . 30-day refills only.) 30 tablet 11     prochlorperazine (COMPAZINE) 5 MG tablet Take 1 tablet (5 mg) by mouth every 6 hours as needed for nausea or vomiting 60 tablet 3  "    sennosides (SENOKOT) 8.6 MG tablet 1-2 tabs a day as needed for constipation 60 tablet 1     spironolactone (ALDACTONE) 25 MG tablet Take 1 tablet (25 mg) by mouth daily . Take one additional 0.5 tab daily as needed for weight gain. 45 day refills. (Patient taking differently: Take 25 mg by mouth At Bedtime . Take one additional 0.5 tab daily as needed for weight gain. 45 day refills.) 45 tablet 11     sucralfate (CARAFATE) 1 GM tablet Take 1 tablet (1 g) by mouth 4 times daily (Patient taking differently: Take 1 g by mouth 4 times daily as needed) 120 tablet 3     terbinafine (LAMISIL) 1 % external cream Apply topically 2 times daily (Patient taking differently: Apply topically as needed) 42 g 1     traMADol (ULTRAM) 50 MG tablet TAKE 1 TABLET(50 MG) BY MOUTH EVERY 8 HOURS AS NEEDED FOR SEVERE PAIN 60 tablet 3     vitamin D2 (ERGOCALCIFEROL) 69912 units (1250 mcg) capsule Take 1 capsule (50,000 Units) by mouth every 7 days (Patient taking differently: Take 50,000 Units by mouth every 7 days Sunday) 4 capsule 0       Systemic Review:   CONSTITUTIONAL: NEGATIVE for fever, chills, change in weight  ENT/MOUTH: NEGATIVE for ear, mouth and throat problems  RESP: NEGATIVE for significant cough or SOB  CV: NEGATIVE for chest pain, palpitations or peripheral edema    Physical Examination:   Vital Signs: /85 (BP Location: Right arm)   Pulse 81   Temp 96.8  F (36  C) (Temporal)   Resp 16   Ht 1.575 m (5' 2\")   Wt 77.6 kg (171 lb)   SpO2 96%   BMI 31.28 kg/m    GENERAL: healthy, alert and no distress  NECK: no adenopathy, no asymmetry, masses, or scars  RESP: lungs clear to auscultation - no rales, rhonchi or wheezes  CV: regular rate and rhythm, normal S1 S2, no S3 or S4, no murmur, click or rub, no peripheral edema and peripheral pulses strong  ABDOMEN: soft, nontender, no hepatosplenomegaly, no masses and bowel sounds normal  MS: no gross musculoskeletal defects noted, no edema    Plan: Appropriate to " proceed as scheduled.      Percy Gross MD  5/25/2023    PCP:  Kash Solano

## 2023-06-16 ENCOUNTER — OFFICE VISIT (OUTPATIENT)
Dept: OBGYN | Facility: CLINIC | Age: 58
End: 2023-06-16
Attending: OBSTETRICS & GYNECOLOGY
Payer: COMMERCIAL

## 2023-06-16 VITALS
DIASTOLIC BLOOD PRESSURE: 72 MMHG | HEART RATE: 84 BPM | BODY MASS INDEX: 30.36 KG/M2 | SYSTOLIC BLOOD PRESSURE: 113 MMHG | WEIGHT: 166 LBS

## 2023-06-16 DIAGNOSIS — R14.0 BLOATING: ICD-10-CM

## 2023-06-16 DIAGNOSIS — N93.9 ABNORMAL UTERINE BLEEDING (AUB): Primary | ICD-10-CM

## 2023-06-16 PROCEDURE — 99215 OFFICE O/P EST HI 40 MIN: CPT | Performed by: OBSTETRICS & GYNECOLOGY

## 2023-06-16 PROCEDURE — G0463 HOSPITAL OUTPT CLINIC VISIT: HCPCS | Performed by: OBSTETRICS & GYNECOLOGY

## 2023-06-16 RX ORDER — AZELASTINE 1 MG/ML
SPRAY, METERED NASAL
COMMUNITY
Start: 2023-04-28 | End: 2023-06-21

## 2023-06-16 ASSESSMENT — PATIENT HEALTH QUESTIONNAIRE - PHQ9
SUM OF ALL RESPONSES TO PHQ QUESTIONS 1-9: 0
5. POOR APPETITE OR OVEREATING: NOT AT ALL

## 2023-06-16 ASSESSMENT — ANXIETY QUESTIONNAIRES
IF YOU CHECKED OFF ANY PROBLEMS ON THIS QUESTIONNAIRE, HOW DIFFICULT HAVE THESE PROBLEMS MADE IT FOR YOU TO DO YOUR WORK, TAKE CARE OF THINGS AT HOME, OR GET ALONG WITH OTHER PEOPLE: NOT DIFFICULT AT ALL
GAD7 TOTAL SCORE: 0
5. BEING SO RESTLESS THAT IT IS HARD TO SIT STILL: NOT AT ALL
7. FEELING AFRAID AS IF SOMETHING AWFUL MIGHT HAPPEN: NOT AT ALL
3. WORRYING TOO MUCH ABOUT DIFFERENT THINGS: NOT AT ALL
GAD7 TOTAL SCORE: 0
1. FEELING NERVOUS, ANXIOUS, OR ON EDGE: NOT AT ALL
2. NOT BEING ABLE TO STOP OR CONTROL WORRYING: NOT AT ALL
6. BECOMING EASILY ANNOYED OR IRRITABLE: NOT AT ALL

## 2023-06-16 NOTE — PROGRESS NOTES
Janine returns to follow-up after EUA, EMB and removal of IUD.  She states she has had no bleeding since the IUD was removed.  She states that in general she just doesn't feel well.  She feel bloated, especially in her upper abdomen.  She had colonoscopy and endoscopy a couple weeks ago and she believes all the results were normal, but she still doesn't feel that the bloating she is experiencing is normal.  She feels like she had gained weight and she is overall frustrated.    Past Medical History:   Diagnosis Date     Abnormal glandular Papanicolaou smear of cervix 1992     Allergic rhinitis     Allergy, airborne subst     Arthritis      ASCVD (arteriosclerotic cardiovascular disease)      Chronic pain      Chronic pancreatitis (H)     idiopathic, Tx: PPI, antioxidants, pancreatic enzymes     Common migraine      Coronary artery disease      Costochondritis      Difficulty in walking(719.7)      Dyspnea on exertion      Ectasia, mammary duct     [facility], [facility] discharge     Elevated fasting glucose      Gastro-oesophageal reflux disease      Granulomatosis with polyangiitis (H)      Hernia      History of angina      Hyperlipidemia LDL goal < 100      Hypertension goal BP (blood pressure) < 140/90     Essential hypertension     Iron deficiency anemia      Ischemic cardiomyopathy      Menorrhagia      Migraine headaches      Mild persistent asthma      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 Matias, neg w/u, Rx'd w/prednisone     Ventral hernia, unspecified, without mention of obstruction or gangrene 2012     Past Surgical History:   Procedure Laterality Date     C/SECTION, LOW TRANSVERSE  1996         CARDIAC SURGERY      cardiac stent- recent in 16; 4 other stents     COLONOSCOPY N/A 2023    Procedure: COLONOSCOPY, WITH POLYPECTOMY;  Surgeon: Percy Gross MD;  Location: [facility]     DILATION AND CURETTAGE N/A 2016    Procedure: DILATION AND CURETTAGE;  Surgeon: Nahed Butler MD;  [facility]     ENDOMETRIAL SAMPLING (BIOPSY) N/A 2023    Procedure: ENDOMETRIAL BIOPSY;  Surgeon: Nahed Butler MD;  [facility]     ESOPHAGOSCOPY, GASTROSCOPY, DUODENOSCOPY (EGD), COMBINED N/A 2022    Procedure: ESOPHAGOGASTRODUODENOSCOPY, WITH BIOPSY;  Surgeon: Enzo Sesay MD;  [facility]     ESOPHAGOSCOPY, GASTROSCOPY, DUODENOSCOPY (EGD), COMBINED N/A 2023    Procedure: ESOPHAGOGASTRODUODENOSCOPY, WITH BIOPSY;  Surgeon: Percy Gross MD;  Location: [facility]     EXAM UNDER ANESTHESIA PELVIC N/A 2023    Procedure: EXAM UNDER ANESTHESIA;  Surgeon: Nahed Butler MD;  [facility]     HC UGI ENDOSCOPY W EUS  2008    Dr. Pastrana, possible chronic pancreatitis, fatty liver     HERNIA REPAIR  2012    done at [facility] [place]     INSERT INTRAUTERINE DEVICE N/A 2016    Procedure: INSERT INTRAUTERINE DEVICE;  Surgeon: Nahed Butler MD;  [facility]     INT UTERINE FIBRIOD EMBOLIZATION  10/29/2014     LAPAROSCOPIC CHOLECYSTECTOMY  2008    Dr. Arnol GRUBBS TX, CERVICAL  1992    s/p LEEP, done at [facility] in [place].     ORBITOTOMY Right 03/15/2016    Procedure: ORBITOTOMY;  Surgeon: Myron Cyr MD;   [facility]     ORBITOTOMY Right 08/04/2017    Procedure: ORBITOTOMY;  RIGHT ORBITOTOMY AND BIOPSY;  Surgeon: Charis Holbrook MD;  [facility]     REMOVE INTRAUTERINE DEVICE N/A 4/28/2023    Procedure: Remove intrauterine device,;  Surgeon: Nahed Butler MD;  [facility]     REPAIR PTOSIS Right 11/17/2017    Procedure: REPAIR PTOSIS;  RIGHT UPPER LID PTOSIS REPAIR;  Surgeon: Myron Cyr MD;  [facility]     UPPER GI ENDOSCOPY  10/21/2008    mild gastritis, Dr. Rocky CALDERA ECHO HEART XTHORACIC,COMPLETE, W/O DOPPLER  02/04/2004    [facility] WNL, EF 60%     Physical exam:  /72   Pulse 84   Wt 75.3 kg (166 lb)   LMP  (LMP Unknown)   BMI 30.36 kg/m    Gen'l: no acute distress    Review of vitals from encounters back to 2016 her weight is generally pretty stable with values consistently between 164 and 172.    Review of results of EMB:   - Fragments of inactive endometrium, decidualized stroma consistent with exogenous progesterone effect  - Negative for hyperplasia or malignancy    Assessment/Plan: 57 yo with history of AUB managed with Mirena IUD, now removed.    - reviewed that she is likely menopausal, I do not expect that she will have episodes of vaginal bleeding in the future and if she does to call our clinic for evaluation.  - patient with many health concerns.  I reviewed her vitals flowsheets with her and reassured her that this 10# weight fluctuation has been present for many years and is likely normal.  I encouraged her to discuss with Dr. Solano when she sees him next week.    On the day of this encounter at 40 minutes of time was spent in consultation with face-to-face discussion, review of historical information, examination, documentation and post visit activities including communication with other providers and coordination of patient care.    Nahed Butler MD

## 2023-06-16 NOTE — PATIENT INSTRUCTIONS
Thank you for trusting us with your care!     If you need to contact us for questions about:  Symptoms, Scheduling & Medical Questions; Non-urgent (2-3 day response) [place] message, Urgent (needing response today) [phone] (if after 3:30pm next day response)   Prescriptions: Please call your Pharmacy   Billing: [place] [phone] or [facility]:[phone]

## 2023-06-16 NOTE — LETTER
6/16/2023       RE: Janine Cornell  [address]  [facility] [address]     Dear Colleague,    Thank you for referring your patient, Janine Cornell, to the [facility] [place] at [facility]. Please see a copy of my visit note below.    Janine returns to follow-up after EUA, EMB and removal of IUD.  She states she has had no bleeding since the IUD was removed.  She states that in general she just doesn't feel well.  She feel bloated, especially in her upper abdomen.  She had colonoscopy and endoscopy a couple weeks ago and she believes all the results were normal, but she still doesn't feel that the bloating she is experiencing is normal.  She feels like she had gained weight and she is overall frustrated.    Past Medical History:   Diagnosis Date    Abnormal glandular Papanicolaou smear of cervix 1992    Allergic rhinitis     Allergy, airborne subst    Arthritis     ASCVD (arteriosclerotic cardiovascular disease)     Chronic pain     Chronic pancreatitis (H)     idiopathic, Tx: PPI, antioxidants, pancreatic enzymes    Common migraine     Coronary artery disease     Costochondritis     Difficulty in walking(719.7)     Dyspnea on exertion     Ectasia, mammary duct     [facility], persistent nipple discharge    Elevated fasting glucose     Gastro-oesophageal reflux disease     Granulomatosis with polyangiitis (H)     Hernia     History of angina     Hyperlipidemia LDL goal < 100     Hypertension goal BP (blood pressure) < 140/90     Essential hypertension    Iron deficiency anemia     Ischemic cardiomyopathy     Menorrhagia     Migraine headaches     Mild persistent asthma     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 Matias, neg w/u, Rx'd w/prednisone    Ventral hernia, unspecified, without mention of obstruction or gangrene 2012     Past Surgical History:   Procedure Laterality Date    C/SECTION, LOW TRANSVERSE  1996        CARDIAC SURGERY      cardiac stent- recent in 16; 4 other stents    COLONOSCOPY N/A 2023    Procedure: COLONOSCOPY, WITH POLYPECTOMY;  Surgeon: Percy Gross MD;  Location: [facility]    DILATION AND CURETTAGE N/A 2016    Procedure: DILATION AND CURETTAGE;  Surgeon: Nahed Butler MD;  [facility]    ENDOMETRIAL SAMPLING (BIOPSY) N/A 2023    Procedure: ENDOMETRIAL BIOPSY;  Surgeon: Nahed Butler MD;  [facility]    ESOPHAGOSCOPY, GASTROSCOPY, DUODENOSCOPY (EGD), COMBINED N/A 2022    Procedure: ESOPHAGOGASTRODUODENOSCOPY, WITH BIOPSY;  Surgeon: Enzo Sesay MD;  [facility]    ESOPHAGOSCOPY, GASTROSCOPY, DUODENOSCOPY (EGD), COMBINED N/A 2023    Procedure: ESOPHAGOGASTRODUODENOSCOPY, WITH BIOPSY;  Surgeon: Percy Gross MD;  Location: [facility] OR    EXAM UNDER ANESTHESIA PELVIC N/A 2023    Procedure: EXAM UNDER ANESTHESIA;  Surgeon: Nahed Butler MD;  [facility]    HC UGI ENDOSCOPY W EUS  2008    Dr. Pastrana, possible chronic pancreatitis, fatty liver    HERNIA REPAIR  2012    [place] [facility] [place]    INSERT INTRAUTERINE DEVICE N/A 2016    Procedure: INSERT INTRAUTERINE DEVICE;  Surgeon: Nahed Butler MD;  [facility]    INT UTERINE FIBRIOD EMBOLIZATION   10/29/2014    LAPAROSCOPIC CHOLECYSTECTOMY  05/28/2008    Dr. Arnol GRUBBS TX, CERVICAL  1992    s/p HUMERA, done at [facility] in [place].    ORBITOTOMY Right 03/15/2016    Procedure: ORBITOTOMY;  Surgeon: Myron Cyr MD;  Location: [facility]    ORBITOTOMY Right 08/04/2017    Procedure: ORBITOTOMY;  RIGHT ORBITOTOMY AND BIOPSY;  Surgeon: Charis Holbrook MD;  Location: [facility]    REMOVE INTRAUTERINE DEVICE N/A 4/28/2023    Procedure: Remove intrauterine device,;  Surgeon: Nahed Butler MD;  [facility]    REPAIR PTOSIS Right 11/17/2017    Procedure: REPAIR PTOSIS;  RIGHT UPPER LID PTOSIS REPAIR;  Surgeon: Myron Cyr MD;  Location: [facility]    UPPER GI ENDOSCOPY  10/21/2008    mild gastritis, Dr. Rocky CALDERA ECHO HEART XTHORACIC,COMPLETE, W/O DOPPLER  02/04/2004    [facility] WNL, EF 60%     Physical exam:  /72   Pulse 84   Wt 75.3 kg (166 lb)   LMP  (LMP Unknown)   BMI 30.36 kg/m    Gen'l: no acute distress    Review of vitals from encounters back to 2016 her weight is generally pretty stable with values consistently between 164 and 172.    Review of results of EMB:   - Fragments of inactive endometrium, decidualized stroma consistent with exogenous progesterone effect  - Negative for hyperplasia or malignancy    Assessment/Plan: 57 yo with history of AUB managed with Mirena IUD, now removed.    - reviewed that she is likely menopausal, I do not expect that she will have episodes of vaginal bleeding in the future and if she does to call our clinic for evaluation.  - patient with many health concerns.  I reviewed her vitals flowsheets with her and reassured her that this 10# weight fluctuation has been present for many years and is likely normal.  I encouraged her to discuss with Dr. Solano when she sees him next week.    On the day of this encounter at 40 minutes of time was spent in consultation with face-to-face discussion, review of historical  information, examination, documentation and post visit activities including communication with other providers and coordination of patient care.    Nahed Butler MD

## 2023-06-21 ENCOUNTER — OFFICE VISIT (OUTPATIENT)
Dept: FAMILY MEDICINE | Facility: CLINIC | Age: 58
End: 2023-06-21
Payer: COMMERCIAL

## 2023-06-21 ENCOUNTER — LAB (OUTPATIENT)
Dept: LAB | Facility: CLINIC | Age: 58
End: 2023-06-21
Payer: COMMERCIAL

## 2023-06-21 VITALS
HEIGHT: 62 IN | HEART RATE: 86 BPM | SYSTOLIC BLOOD PRESSURE: 117 MMHG | WEIGHT: 165.3 LBS | OXYGEN SATURATION: 99 % | BODY MASS INDEX: 30.42 KG/M2 | DIASTOLIC BLOOD PRESSURE: 75 MMHG

## 2023-06-21 DIAGNOSIS — R19.4 CHANGE IN BOWEL HABIT: ICD-10-CM

## 2023-06-21 DIAGNOSIS — K58.8 OTHER IRRITABLE BOWEL SYNDROME: ICD-10-CM

## 2023-06-21 DIAGNOSIS — R10.10 UPPER ABDOMINAL PAIN: ICD-10-CM

## 2023-06-21 DIAGNOSIS — M79.2 NEURALGIA: ICD-10-CM

## 2023-06-21 DIAGNOSIS — D72.829 LEUKOCYTOSIS, UNSPECIFIED TYPE: ICD-10-CM

## 2023-06-21 DIAGNOSIS — R19.7 DIARRHEA, UNSPECIFIED TYPE: ICD-10-CM

## 2023-06-21 DIAGNOSIS — E11.9 TYPE 2 DIABETES, HBA1C GOAL < 7% (H): ICD-10-CM

## 2023-06-21 DIAGNOSIS — K26.9 DUODENAL ULCER WITHOUT HEMORRHAGE OR PERFORATION AND WITHOUT OBSTRUCTION: ICD-10-CM

## 2023-06-21 DIAGNOSIS — R19.4 CHANGE IN BOWEL HABIT: Primary | ICD-10-CM

## 2023-06-21 DIAGNOSIS — I77.82 ANCA-ASSOCIATED VASCULITIS (H): ICD-10-CM

## 2023-06-21 DIAGNOSIS — Q45.3 PANCREATIC ABNORMALITY: ICD-10-CM

## 2023-06-21 DIAGNOSIS — Z51.81 MEDICATION MONITORING ENCOUNTER: ICD-10-CM

## 2023-06-21 LAB
ALBUMIN SERPL BCG-MCNC: 4.8 G/DL (ref 3.5–5.2)
ALP SERPL-CCNC: 89 U/L (ref 35–104)
ALT SERPL W P-5'-P-CCNC: 27 U/L (ref 0–50)
ANION GAP SERPL CALCULATED.3IONS-SCNC: 15 MMOL/L (ref 7–15)
AST SERPL W P-5'-P-CCNC: 23 U/L (ref 0–45)
BASOPHILS # BLD AUTO: 0.1 10E3/UL (ref 0–0.2)
BASOPHILS NFR BLD AUTO: 0 %
BILIRUB SERPL-MCNC: 0.3 MG/DL
BUN SERPL-MCNC: 24.3 MG/DL (ref 6–20)
CALCIUM SERPL-MCNC: 10 MG/DL (ref 8.6–10)
CHLORIDE SERPL-SCNC: 100 MMOL/L (ref 98–107)
CREAT SERPL-MCNC: 1.14 MG/DL (ref 0.51–0.95)
DEPRECATED HCO3 PLAS-SCNC: 22 MMOL/L (ref 22–29)
EOSINOPHIL # BLD AUTO: 0.4 10E3/UL (ref 0–0.7)
EOSINOPHIL NFR BLD AUTO: 2 %
ERYTHROCYTE [DISTWIDTH] IN BLOOD BY AUTOMATED COUNT: 14.6 % (ref 10–15)
GFR SERPL CREATININE-BSD FRML MDRD: 56 ML/MIN/1.73M2
GLUCOSE SERPL-MCNC: 242 MG/DL (ref 70–99)
HBA1C MFR BLD: 9.5 %
HCT VFR BLD AUTO: 44.1 % (ref 35–47)
HGB BLD-MCNC: 14.4 G/DL (ref 11.7–15.7)
IMM GRANULOCYTES # BLD: 0.1 10E3/UL
IMM GRANULOCYTES NFR BLD: 0 %
LIPASE SERPL-CCNC: 36 U/L (ref 13–60)
LYMPHOCYTES # BLD AUTO: 3.1 10E3/UL (ref 0.8–5.3)
LYMPHOCYTES NFR BLD AUTO: 20 %
MCH RBC QN AUTO: 25.2 PG (ref 26.5–33)
MCHC RBC AUTO-ENTMCNC: 32.7 G/DL (ref 31.5–36.5)
MCV RBC AUTO: 77 FL (ref 78–100)
MONOCYTES # BLD AUTO: 1 10E3/UL (ref 0–1.3)
MONOCYTES NFR BLD AUTO: 7 %
NEUTROPHILS # BLD AUTO: 10.9 10E3/UL (ref 1.6–8.3)
NEUTROPHILS NFR BLD AUTO: 71 %
NRBC # BLD AUTO: 0 10E3/UL
NRBC BLD AUTO-RTO: 0 /100
PLATELET # BLD AUTO: 395 10E3/UL (ref 150–450)
POTASSIUM SERPL-SCNC: 4.1 MMOL/L (ref 3.4–5.3)
PROT SERPL-MCNC: 7.8 G/DL (ref 6.4–8.3)
RBC # BLD AUTO: 5.72 10E6/UL (ref 3.8–5.2)
SODIUM SERPL-SCNC: 137 MMOL/L (ref 136–145)
TOTAL PROTEIN SERUM FOR ELP: 7.4 G/DL (ref 6.4–8.3)
VIT B12 SERPL-MCNC: 567 PG/ML (ref 232–1245)
WBC # BLD AUTO: 15.5 10E3/UL (ref 4–11)

## 2023-06-21 PROCEDURE — 84165 PROTEIN E-PHORESIS SERUM: CPT | Mod: 26

## 2023-06-21 PROCEDURE — 86140 C-REACTIVE PROTEIN: CPT | Performed by: PATHOLOGY

## 2023-06-21 PROCEDURE — 83690 ASSAY OF LIPASE: CPT | Performed by: PATHOLOGY

## 2023-06-21 PROCEDURE — 83036 HEMOGLOBIN GLYCOSYLATED A1C: CPT | Performed by: FAMILY MEDICINE

## 2023-06-21 PROCEDURE — 99000 SPECIMEN HANDLING OFFICE-LAB: CPT | Performed by: PATHOLOGY

## 2023-06-21 PROCEDURE — 36415 COLL VENOUS BLD VENIPUNCTURE: CPT | Performed by: PATHOLOGY

## 2023-06-21 PROCEDURE — 84155 ASSAY OF PROTEIN SERUM: CPT | Performed by: FAMILY MEDICINE

## 2023-06-21 PROCEDURE — 82607 VITAMIN B-12: CPT | Performed by: FAMILY MEDICINE

## 2023-06-21 PROCEDURE — 85025 COMPLETE CBC W/AUTO DIFF WBC: CPT | Performed by: PATHOLOGY

## 2023-06-21 PROCEDURE — 80053 COMPREHEN METABOLIC PANEL: CPT | Performed by: PATHOLOGY

## 2023-06-21 PROCEDURE — 84165 PROTEIN E-PHORESIS SERUM: CPT | Mod: TC | Performed by: PATHOLOGY

## 2023-06-21 PROCEDURE — 86364 TISS TRNSGLTMNASE EA IG CLAS: CPT | Performed by: FAMILY MEDICINE

## 2023-06-21 PROCEDURE — 99215 OFFICE O/P EST HI 40 MIN: CPT | Performed by: FAMILY MEDICINE

## 2023-06-21 RX ORDER — SUCRALFATE 1 G/1
1 TABLET ORAL 4 TIMES DAILY
Qty: 120 TABLET | Refills: 3 | Status: SHIPPED | OUTPATIENT
Start: 2023-06-21 | End: 2023-12-21

## 2023-06-21 RX ORDER — DICYCLOMINE HCL 20 MG
TABLET ORAL
Qty: 60 TABLET | Refills: 0 | Status: SHIPPED | OUTPATIENT
Start: 2023-06-21 | End: 2023-12-21

## 2023-06-21 NOTE — PROGRESS NOTES
"  Assessment & Plan     Change in bowel habit  Do one remaining infection test. Do pancreas function test. Labs as ordered. Given 2021 MR pancreas not normal, high wbc cause unknown, will get full abdomen MR.   - Stool Enteric Bacteria and Virus Panel  - CBC with platelets and differential; Future  - Tissue transglutaminase clover IgA and IgG; Future  - Comprehensive metabolic panel (BMP + Alb, Alk Phos, ALT, AST, Total. Bili, TP); Future  - Elastase Fecal; Future  - MR Abdomen w/o & w Contrast; Future    Upper abdominal pain  Consider g empty, Sibo test, GI e consult if needed  - CBC with platelets and differential; Future  - Comprehensive metabolic panel (BMP + Alb, Alk Phos, ALT, AST, Total. Bili, TP); Future  - Lipase; Future  - Elastase Fecal; Future  - MR Abdomen w/o & w Contrast; Future    Pancreatic abnormality  Same  - MR Abdomen w/o & w Contrast; Future    Neuralgia  Likely PN; given DM likely cause, although has autoimmune d/o too  - Vitamin B12; Future  - Adult Neurology  Referral; Future  - Protein electrophoresis; Future    Leukocytosis, unspecified type  Eval further, as not explained by egd/colonoscopy, and did not go away after Gyn procedure  - Protein electrophoresis; Future  - MR Abdomen w/o & w Contrast; Future    Type 2 diabetes, HbA1c goal < 7% (H)  Due  - Hemoglobin A1c; Future    Other irritable bowel syndrome  Helps mildly  - dicyclomine (BENTYL) 20 MG tablet; TAKE 1 TABLET(20 MG) BY MOUTH FOUR TIMES DAILY AS NEEDED.    Duodenal ulcer without hemorrhage or perforation and without obstruction  Same  - sucralfate (CARAFATE) 1 GM tablet; Take 1 tablet (1 g) by mouth 4 times daily    55 minutes spent by me on the date of the encounter doing chart review, history and exam, documentation and further activities per the note    BMI:   Estimated body mass index is 30.23 kg/m  as calculated from the following:    Height as of this encounter: 1.575 m (5' 2.01\").    Weight as of this encounter: 75 " "kg (165 lb 4.8 oz).     No follow-ups on file.    Kash Solano MD  [facility] [place]    Adrienne Mehta is a 58 year old, presenting for the following health issues:  Follow Up (Pt here for follow up and would like to discuss stomach issues and pain and \"odd sensations in feet\".)    HPI Here for a few things  One, ongoing GI symptoms. Appetie in intact. No n/v.. But if eats or drinks, even juice, no fat, gets epig pain that can go to the back, and gets very bloated. Within 30 minutes has to have BM, can be normal but often loose or liquid, no blood or melena. Sx seemed to overall worsen after prep for recent egd/colonoscopy; non diagnostic, some gastritis but not enough to explain her sx, and remains on hi dose PPI. Sucralfate and benty help mildly, requests refill  After inpt stay 2 years ago she deferred GI followup per [facility] phone notes post inpt stay; inpt GI advised consider ERCP or EUS  Not on steroids, wbc is hi w/ left shift, unexplained by recent EGD colonoscopy, and not better after Gyn procedure (had imbedded IUD extracted).  DM: saw new Shana CARRINGTON, she's considering if should f/u there or not. Will redo hgba1c today.  Thyroid: reviewed Endo notes about thyroid and about DM, labs utd  Pt defers covid booster    Pt recalls in past doing negative SIBO test and normal gastric empty (we discussed these tests as options too)    Has DM, was told in past has some PN, recently neuralgias up to thighs bilat in legs, and some in arms too.     Also notes overall more easily fatigued, and having ROBERTS, sees cardiology next week will discuss    Past Medical History:   Diagnosis Date     Abnormal glandular Papanicolaou smear of cervix 1992     Allergic rhinitis     Allergy, airborne subst     Arthritis      ASCVD (arteriosclerotic cardiovascular disease)      Chronic pain      Chronic pancreatitis (H)     idiopathic, Tx: PPI, antioxidants, pancreatic enzymes     Common migraine      " Coronary artery disease      Costochondritis      Difficulty in walking(719.7)      Dyspnea on exertion      Ectasia, mammary duct     [facility], persistent n[facility] discharge     Elevated fasting glucose      Gastro-oesophageal reflux disease      Granulomatosis with polyangiitis (H)      Hernia      History of angina      Hyperlipidemia LDL goal < 100      Hypertension goal BP (blood pressure) < 140/90     Essential hypertension     Iron deficiency anemia      Ischemic cardiomyopathy      Menorrhagia      Migraine headaches      Mild persistent asthma      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 Matias, neg w/u, Rx'd w/prednisone     Ventral hernia, unspecified, without mention of obstruction or gangrene 2012     Past Surgical History:   Procedure Laterality Date     C/SECTION, LOW TRANSVERSE  1996         CARDIAC SURGERY      cardiac stent- recent in 16; 4 other stents     COLONOSCOPY N/A 2023    Procedure: COLONOSCOPY, WITH POLYPECTOMY;  Surgeon: Percy Gross MD;  Location: [facility]     DILATION AND CURETTAGE N/A 2016    Procedure: DILATION AND CURETTAGE;  Surgeon: Nahed Butler MD;  [facility]     ENDOMETRIAL SAMPLING (BIOPSY) N/A 2023    Procedure: ENDOMETRIAL BIOPSY;  Surgeon:  Nahed Butler MD;  [facility]     ESOPHAGOSCOPY, GASTROSCOPY, DUODENOSCOPY (EGD), COMBINED N/A 03/31/2022    Procedure: ESOPHAGOGASTRODUODENOSCOPY, WITH BIOPSY;  Surgeon: Enzo Sesay MD;  [facility]     ESOPHAGOSCOPY, GASTROSCOPY, DUODENOSCOPY (EGD), COMBINED N/A 5/25/2023    Procedure: ESOPHAGOGASTRODUODENOSCOPY, WITH BIOPSY;  Surgeon: Percy Gross MD;  Location: [facility]     EXAM UNDER ANESTHESIA PELVIC N/A 4/28/2023    Procedure: EXAM UNDER ANESTHESIA;  Surgeon: Nahed Butler MD;  [facility]     HC UGI ENDOSCOPY W EUS  11/07/2008    Dr. Pastrana, possible chronic pancreatitis, fatty liver     HERNIA REPAIR  12/01/2012    done at [facility] [place]     INSERT INTRAUTERINE DEVICE N/A 07/06/2016    Procedure: INSERT INTRAUTERINE DEVICE;  Surgeon: Nahed Butler MD;  [facility]     INT UTERINE FIBRIOD EMBOLIZATION  10/29/2014     LAPAROSCOPIC CHOLECYSTECTOMY  05/28/2008    Dr. Arnol GRUBBS TX, CERVICAL  1992    s/p LEEP, done at [facility] in [place].     ORBITOTOMY Right 03/15/2016    Procedure: ORBITOTOMY;  Surgeon: Myron Cyr MD;  Location: [facility]     ORBITOTOMY Right 08/04/2017    Procedure: ORBITOTOMY;  RIGHT ORBITOTOMY AND BIOPSY;  Surgeon: Charis Holbrook MD;  Location: [facility]     REMOVE INTRAUTERINE DEVICE N/A 4/28/2023    Procedure: Remove intrauterine device,;  Surgeon: Nahed Butler MD;  [facility]     REPAIR PTOSIS Right 11/17/2017    Procedure: REPAIR PTOSIS;  RIGHT UPPER LID PTOSIS REPAIR;  Surgeon: Myron Cyr MD;  Location: [facility]     UPPER GI ENDOSCOPY  10/21/2008    mild gastritis, Dr. Rocky CALDERA ECHO HEART XTHORACIC,COMPLETE, W/O DOPPLER  02/04/2004    [facility] WNL, EF 60%     Current Outpatient Medications   Medication     dicyclomine (BENTYL) 20 MG tablet     sucralfate (CARAFATE) 1 GM tablet     acetaminophen (TYLENOL) 325 MG tablet     ADVAIR DISKUS 250-50 MCG/ACT inhaler     albuterol (2.5  MG/3ML) 0.083% neb solution     albuterol (PROAIR HFA, PROVENTIL HFA, VENTOLIN HFA) 108 (90 BASE) MCG/ACT inhaler     BANOPHEN 25 MG tablet     blood glucose (NO BRAND SPECIFIED) lancets standard     blood glucose (NO BRAND SPECIFIED) test strip     Blood Pressure Monitor KIT     calcium carbonate (TUMS) 500 MG chewable tablet     clopidogrel (PLAVIX) 75 MG tablet     cyclobenzaprine (FLEXERIL) 10 MG tablet     empagliflozin (JARDIANCE) 10 MG TABS tablet     EPIPEN 2-RIKY 0.3 MG/0.3ML injection     esomeprazole (NEXIUM) 40 MG DR capsule     estradiol (VAGIFEM) 10 MCG TABS vaginal tablet     fluticasone (FLONASE) 50 MCG/ACT nasal spray     folic acid (FOLVITE) 1 MG tablet     hydrocortisone (CORTAID) 1 % external cream     insulin glargine (LANTUS PEN) 100 UNIT/ML pen     insulin pen needle (BD PEN NEEDLE MARCK 2ND GEN) 32G X 4 MM miscellaneous     ketoconazole (NIZORAL) 2 % shampoo     levocetirizine (XYZAL) 5 MG tablet     lisinopril-hydrochlorothiazide (ZESTORETIC) 20-25 MG tablet     magnesium 250 MG tablet     metFORMIN (GLUCOPHAGE XR) 500 MG 24 hr tablet     metoprolol succinate ER (TOPROL XL) 100 MG 24 hr tablet     Multiple Vitamin (TAB-A-GERALD) TABS     nitroGLYcerin (NITROSTAT) 0.4 MG sublingual tablet     olopatadine (PATANOL) 0.1 % ophthalmic solution     pravastatin (PRAVACHOL) 40 MG tablet     prochlorperazine (COMPAZINE) 5 MG tablet     sennosides (SENOKOT) 8.6 MG tablet     spironolactone (ALDACTONE) 25 MG tablet     terbinafine (LAMISIL) 1 % external cream     traMADol (ULTRAM) 50 MG tablet     vitamin D2 (ERGOCALCIFEROL) 34756 units (1250 mcg) capsule     No current facility-administered medications for this visit.     Allergies   Allergen Reactions     Amoxicillin-Pot Clavulanate      Unknown possible hives and edema     Amoxicillin-Pot Clavulanate      Artificial Sweetner (Informational Only)  Other (See Comments)     Increased headache     Azithromycin      Clavulanic Acid      Diatrizoate Other  "(See Comments)     Pt wants this listed because she is allergic to shellfish      Imitrex [Triptans]      Severe face/neck/chest tightness and flushing side effects      Penicillins Hives     Unknown      Pork Allergy      Stomach pain, cramping, diarrhea, itching, nausea and headaches     Shellfish Allergy Hives and Swelling     Shellfish-Derived Products      Sulfa Antibiotics Hives and Swelling     Sulfatolamide      Zithromax [Azithromycin Dihydrate] Swelling     Unknown      Wt no sig changes        Review of Systems         Objective    /75 (BP Location: Right arm, Patient Position: Sitting, Cuff Size: Adult Regular)   Pulse 86   Ht 1.575 m (5' 2.01\")   Wt 75 kg (165 lb 4.8 oz)   LMP  (LMP Unknown)   SpO2 99%   BMI 30.23 kg/m    Body mass index is 30.23 kg/m .  Physical Exam   GENERAL: healthy, alert and no distress  RESP: lungs clear to auscultation - no rales, rhonchi or wheezes  CV: regular rate and rhythm, normal S1 S2, no S3 or S4, no murmur, click or rub, no peripheral edema and peripheral pulses strong  ABDOMEN: soft, nontender, no hepatosplenomegaly, no masses and bowel sounds normal  MS: no gross musculoskeletal defects noted, no edema            "

## 2023-06-21 NOTE — NURSING NOTE
"Janine Cornell is a 58 year old female patient that presents today in clinic for the following:    Chief Complaint   Patient presents with     Follow Up     Pt here for follow up and would like to discuss stomach issues and pain and \"odd sensations in feet\".     The patient's allergies and medications were reviewed as noted. A set of vitals were recorded as noted without incident: /75 (BP Location: Right arm, Patient Position: Sitting, Cuff Size: Adult Regular)   Pulse 86   Ht 1.575 m (5' 2.01\")   Wt 75 kg (165 lb 4.8 oz)   LMP  (LMP Unknown)   SpO2 99%   BMI 30.23 kg/m  . The patient does not have any other questions for the provider.    Kasandra Fletcher, EMT at 2:39 PM on 6/21/2023  "

## 2023-06-22 DIAGNOSIS — D72.829 LEUKOCYTOSIS, UNSPECIFIED TYPE: Primary | ICD-10-CM

## 2023-06-22 LAB
ALBUMIN SERPL ELPH-MCNC: 4.7 G/DL (ref 3.7–5.1)
ALPHA1 GLOB SERPL ELPH-MCNC: 0.2 G/DL (ref 0.2–0.4)
ALPHA2 GLOB SERPL ELPH-MCNC: 1 G/DL (ref 0.5–0.9)
B-GLOBULIN SERPL ELPH-MCNC: 0.9 G/DL (ref 0.6–1)
CRP SERPL-MCNC: 7.71 MG/L
GAMMA GLOB SERPL ELPH-MCNC: 0.6 G/DL (ref 0.7–1.6)
M PROTEIN SERPL ELPH-MCNC: 0 G/DL
PROT PATTERN SERPL ELPH-IMP: ABNORMAL
TTG IGA SER-ACNC: 0.2 U/ML
TTG IGG SER-ACNC: <0.6 U/ML

## 2023-06-26 ENCOUNTER — TELEPHONE (OUTPATIENT)
Dept: INTERNAL MEDICINE | Facility: CLINIC | Age: 58
End: 2023-06-26
Payer: COMMERCIAL

## 2023-06-26 DIAGNOSIS — E11.9 TYPE 2 DIABETES, HBA1C GOAL < 7% (H): ICD-10-CM

## 2023-06-26 NOTE — TELEPHONE ENCOUNTER
Spoke with pt and informed the results.   I relayed Dr. Mckinnon's message regarding the labs, but she is still confused why she needs to have those labs done now while her appt with Dr. Mckinnon is in September. I cannot answer that question, she needs to find out from Rheumatology.    She wants to wait to do MRI after 7/7/23. She will discuss about the new urint test and stool test during the appt on 7/7/23.   She agreed you discuss her diabetes with new endocrinologist.    Katy    ----------------------------------------------------------------      Majo Mckinnon MD Joo, Soon-Mi, RN  Yes, the labs that are showing under my name as future order, still need to get done. They were due in May.     -----------------------------------------------------------------------      ----- Message from Kash Solano MD sent at 6/23/2023  1:41 PM CDT -----  On labs I just did diabetes was worse, hgba1c 9.5  She recently met a new endocrinology MD, and was deciding if she'd keep working with this MD or not  Ask Janine if it is ok if I review worsened diabetes numbers with that MD, Dr Pickering    Also white count still up a bit, can be from infection, I thought maybe between recent stool tests, egd/colonoscopy, and Gyn procedure we'd either find a cause or it'd go away, but it is still elevated. Let's have her do the stool tests I ordered the other day as next step.     Also I ordered MR (we talked about this in visit), if not set up perhaps someone could help arrange it

## 2023-06-26 NOTE — TELEPHONE ENCOUNTER
insulin glargine (LANTUS PEN) 100 UNIT/ML pen  Last Written Prescription Date:   4/12/2023  Last Fill Quantity: 75,   # refills: 3  Last Office Visit :  4/12/2023  Future Office visit:   8/1/2023    Routing refill request to provider for review/approval because:  Not sure who to send this to at the present time.  Last filled by Dr. Staci Norman,    But pharmacy asking to PCP Dr. Solano to send a new order.  Please decide who will take over management of this med.  Thank you       Georgia Mercedes RN  Central Triage Red Flags/Med Refills

## 2023-06-27 ENCOUNTER — TELEPHONE (OUTPATIENT)
Dept: CARDIOLOGY | Facility: CLINIC | Age: 58
End: 2023-06-27
Payer: COMMERCIAL

## 2023-06-28 PROCEDURE — 87506 IADNA-DNA/RNA PROBE TQ 6-11: CPT | Performed by: FAMILY MEDICINE

## 2023-06-28 PROCEDURE — 99000 SPECIMEN HANDLING OFFICE-LAB: CPT | Performed by: PATHOLOGY

## 2023-06-28 PROCEDURE — 87209 SMEAR COMPLEX STAIN: CPT | Performed by: FAMILY MEDICINE

## 2023-06-28 PROCEDURE — 82653 EL-1 FECAL QUANTITATIVE: CPT | Performed by: FAMILY MEDICINE

## 2023-06-29 ENCOUNTER — LAB (OUTPATIENT)
Dept: LAB | Facility: CLINIC | Age: 58
End: 2023-06-29
Payer: COMMERCIAL

## 2023-06-29 ENCOUNTER — OFFICE VISIT (OUTPATIENT)
Dept: CARDIOLOGY | Facility: CLINIC | Age: 58
End: 2023-06-29
Attending: INTERNAL MEDICINE
Payer: COMMERCIAL

## 2023-06-29 ENCOUNTER — ANCILLARY PROCEDURE (OUTPATIENT)
Dept: CARDIOLOGY | Facility: CLINIC | Age: 58
End: 2023-06-29
Payer: COMMERCIAL

## 2023-06-29 VITALS
HEART RATE: 85 BPM | WEIGHT: 167.8 LBS | BODY MASS INDEX: 30.88 KG/M2 | HEIGHT: 62 IN | OXYGEN SATURATION: 98 % | SYSTOLIC BLOOD PRESSURE: 117 MMHG | DIASTOLIC BLOOD PRESSURE: 73 MMHG

## 2023-06-29 DIAGNOSIS — R19.4 CHANGE IN BOWEL HABITS: Primary | ICD-10-CM

## 2023-06-29 DIAGNOSIS — I10 HYPERTENSION GOAL BP (BLOOD PRESSURE) < 140/90: ICD-10-CM

## 2023-06-29 DIAGNOSIS — D72.829 LEUKOCYTOSIS, UNSPECIFIED TYPE: ICD-10-CM

## 2023-06-29 DIAGNOSIS — Z51.81 MEDICATION MONITORING ENCOUNTER: ICD-10-CM

## 2023-06-29 DIAGNOSIS — I77.82 ANCA-ASSOCIATED VASCULITIS (H): ICD-10-CM

## 2023-06-29 DIAGNOSIS — E78.5 HYPERLIPIDEMIA LDL GOAL <70: ICD-10-CM

## 2023-06-29 DIAGNOSIS — I25.10 CORONARY ARTERY DISEASE INVOLVING NATIVE HEART WITHOUT ANGINA PECTORIS, UNSPECIFIED VESSEL OR LESION TYPE: ICD-10-CM

## 2023-06-29 DIAGNOSIS — I10 HYPERTENSION, UNSPECIFIED TYPE: ICD-10-CM

## 2023-06-29 DIAGNOSIS — I10 BENIGN ESSENTIAL HYPERTENSION: ICD-10-CM

## 2023-06-29 LAB
ALBUMIN MFR UR ELPH: <6 MG/DL
ALBUMIN SERPL BCG-MCNC: 5.1 G/DL (ref 3.5–5.2)
ALBUMIN UR-MCNC: NEGATIVE MG/DL
ALP SERPL-CCNC: 87 U/L (ref 35–104)
ALT SERPL W P-5'-P-CCNC: 32 U/L (ref 0–50)
ANION GAP SERPL CALCULATED.3IONS-SCNC: 14 MMOL/L (ref 7–15)
APPEARANCE UR: CLEAR
AST SERPL W P-5'-P-CCNC: 28 U/L (ref 0–45)
BILIRUB SERPL-MCNC: 0.4 MG/DL
BILIRUB UR QL STRIP: NEGATIVE
BUN SERPL-MCNC: 22.7 MG/DL (ref 6–20)
C COLI+JEJUNI+LARI FUSA STL QL NAA+PROBE: NOT DETECTED
CALCIUM SERPL-MCNC: 10.1 MG/DL (ref 8.6–10)
CHLORIDE SERPL-SCNC: 97 MMOL/L (ref 98–107)
CHOLEST SERPL-MCNC: 161 MG/DL
COLOR UR AUTO: ABNORMAL
CREAT SERPL-MCNC: 1.16 MG/DL (ref 0.51–0.95)
CREAT UR-MCNC: 71.4 MG/DL
DEPRECATED HCO3 PLAS-SCNC: 26 MMOL/L (ref 22–29)
EC STX1 GENE STL QL NAA+PROBE: NOT DETECTED
EC STX2 GENE STL QL NAA+PROBE: NOT DETECTED
ERYTHROCYTE [SEDIMENTATION RATE] IN BLOOD BY WESTERGREN METHOD: 10 MM/HR (ref 0–30)
GFR SERPL CREATININE-BSD FRML MDRD: 55 ML/MIN/1.73M2
GLUCOSE SERPL-MCNC: 87 MG/DL (ref 70–99)
GLUCOSE UR STRIP-MCNC: >=1000 MG/DL
HDLC SERPL-MCNC: 42 MG/DL
HGB UR QL STRIP: NEGATIVE
KETONES UR STRIP-MCNC: NEGATIVE MG/DL
LDLC SERPL CALC-MCNC: 89 MG/DL
LDLC SERPL DIRECT ASSAY-MCNC: 97 MG/DL
LEUKOCYTE ESTERASE UR QL STRIP: NEGATIVE
LVEF ECHO: NORMAL
NITRATE UR QL: NEGATIVE
NONHDLC SERPL-MCNC: 119 MG/DL
NOROV GI+II ORF1-ORF2 JNC STL QL NAA+PR: NOT DETECTED
PH UR STRIP: 5 [PH] (ref 5–7)
POTASSIUM SERPL-SCNC: 3.5 MMOL/L (ref 3.4–5.3)
PROT SERPL-MCNC: 7.8 G/DL (ref 6.4–8.3)
PROT/CREAT 24H UR: NORMAL MG/G{CREAT}
RBC URINE: <1 /HPF
RVA NSP5 STL QL NAA+PROBE: NOT DETECTED
SALMONELLA SP RPOD STL QL NAA+PROBE: NOT DETECTED
SHIGELLA SP+EIEC IPAH STL QL NAA+PROBE: NOT DETECTED
SODIUM SERPL-SCNC: 137 MMOL/L (ref 136–145)
SP GR UR STRIP: 1.01 (ref 1–1.03)
SQUAMOUS EPITHELIAL: <1 /HPF
TRIGL SERPL-MCNC: 148 MG/DL
UROBILINOGEN UR STRIP-MCNC: NORMAL MG/DL
V CHOL+PARA RFBL+TRKH+TNAA STL QL NAA+PR: NOT DETECTED
WBC URINE: <1 /HPF
Y ENTERO RECN STL QL NAA+PROBE: NOT DETECTED

## 2023-06-29 PROCEDURE — 86335 IMMUNFIX E-PHORSIS/URINE/CSF: CPT | Mod: 26

## 2023-06-29 PROCEDURE — 99214 OFFICE O/P EST MOD 30 MIN: CPT | Mod: 25 | Performed by: INTERNAL MEDICINE

## 2023-06-29 PROCEDURE — 93306 TTE W/DOPPLER COMPLETE: CPT | Performed by: STUDENT IN AN ORGANIZED HEALTH CARE EDUCATION/TRAINING PROGRAM

## 2023-06-29 PROCEDURE — 85652 RBC SED RATE AUTOMATED: CPT | Performed by: PATHOLOGY

## 2023-06-29 PROCEDURE — 86355 B CELLS TOTAL COUNT: CPT | Performed by: INTERNAL MEDICINE

## 2023-06-29 PROCEDURE — 82784 ASSAY IGA/IGD/IGG/IGM EACH: CPT | Performed by: INTERNAL MEDICINE

## 2023-06-29 PROCEDURE — 86037 ANCA TITER EACH ANTIBODY: CPT | Performed by: INTERNAL MEDICINE

## 2023-06-29 PROCEDURE — 83721 ASSAY OF BLOOD LIPOPROTEIN: CPT | Performed by: INTERNAL MEDICINE

## 2023-06-29 PROCEDURE — 80053 COMPREHEN METABOLIC PANEL: CPT | Performed by: PATHOLOGY

## 2023-06-29 PROCEDURE — 81001 URINALYSIS AUTO W/SCOPE: CPT | Performed by: PATHOLOGY

## 2023-06-29 PROCEDURE — 36415 COLL VENOUS BLD VENIPUNCTURE: CPT | Performed by: PATHOLOGY

## 2023-06-29 PROCEDURE — 80061 LIPID PANEL: CPT | Performed by: PATHOLOGY

## 2023-06-29 PROCEDURE — 86335 IMMUNFIX E-PHORSIS/URINE/CSF: CPT | Performed by: STUDENT IN AN ORGANIZED HEALTH CARE EDUCATION/TRAINING PROGRAM

## 2023-06-29 PROCEDURE — 84156 ASSAY OF PROTEIN URINE: CPT | Performed by: PATHOLOGY

## 2023-06-29 PROCEDURE — 86036 ANCA SCREEN EACH ANTIBODY: CPT | Performed by: INTERNAL MEDICINE

## 2023-06-29 PROCEDURE — G0463 HOSPITAL OUTPT CLINIC VISIT: HCPCS | Performed by: INTERNAL MEDICINE

## 2023-06-29 PROCEDURE — 99000 SPECIMEN HANDLING OFFICE-LAB: CPT | Performed by: PATHOLOGY

## 2023-06-29 PROCEDURE — 83876 ASSAY MYELOPEROXIDASE: CPT | Performed by: INTERNAL MEDICINE

## 2023-06-29 RX ORDER — PRAVASTATIN SODIUM 40 MG
40 TABLET ORAL DAILY
Qty: 90 TABLET | Refills: 3 | Status: SHIPPED | OUTPATIENT
Start: 2023-06-29 | End: 2024-05-31

## 2023-06-29 RX ORDER — METOPROLOL SUCCINATE 100 MG/1
100 TABLET, EXTENDED RELEASE ORAL DAILY
Qty: 90 TABLET | Refills: 3 | Status: SHIPPED | OUTPATIENT
Start: 2023-06-29 | End: 2024-05-31

## 2023-06-29 RX ORDER — CLOPIDOGREL BISULFATE 75 MG/1
75 TABLET ORAL DAILY
Qty: 90 TABLET | Refills: 3 | Status: SHIPPED | OUTPATIENT
Start: 2023-06-29 | End: 2024-05-31

## 2023-06-29 RX ORDER — LISINOPRIL AND HYDROCHLOROTHIAZIDE 20; 25 MG/1; MG/1
1 TABLET ORAL DAILY
Qty: 90 TABLET | Refills: 3 | Status: SHIPPED | OUTPATIENT
Start: 2023-06-29 | End: 2024-05-31

## 2023-06-29 ASSESSMENT — PAIN SCALES - GENERAL: PAINLEVEL: NO PAIN (0)

## 2023-06-29 NOTE — PATIENT INSTRUCTIONS
June 29, 2023    Cardiology Provider You Saw During Your Visit: Dr. Peace      Medication Changes: None      Follow Up: With Dr. Peace in 6 months with fasting labs prior to visit        Follow the American Heart Association Diet and Lifestyle Recommendations:  -Limit saturated fat, trans fat, sodium, red meat, sweets and sugar-sweetened beverages. If you choose to eat red meat, compare labels and select the leanest cuts available.  -Aim for at least 150 minutes of moderate physical activity or 75 minutes of vigorous physical activity - or an equal combination of both - each week.      To Reach Us:  -During business hours: [phone], press option # 1 to schedule an appointment or to leave a message for your care team.     -After hours, weekends or holidays: [phone], press option #4 and ask to speak to the on-call cardiologist. Inform them you are a patient at the [facility].        **If you have a cardiac device, please make sure you schedule an in-person device check just prior to your cardiology provider appointments**        Yessy Calzada RN  Cardiology Care Coordinator - General Cardiology  [facility]

## 2023-06-29 NOTE — NURSING NOTE
June 29, 2023      Medication Changes: None      Follow Up: With Dr. Peace in 6 months with fasting labs prior to visit      Patient verbalized understanding of all health information given and agreed to call with further questions or concerns.      Yessy Calzada RN

## 2023-06-29 NOTE — LETTER
6/29/2023      RE: Janine Cornell  [address]  [facility] [address]       Dear Colleague,    Thank you for the opportunity to participate in the care of your patient, Janine Cornell, at the [facility] [place] at [facility]. Please see a copy of my visit note below.    HPI:   I had the privilege to evaluate and examine Ms Janine Cornell, who is a 58 yr -American patient with DM2, Hypothyroidism, PCOS, Dyslipidemia, HTN, and CAD S/P NSTEMI s/p KATHERINE to the mLAD in 2011 and RCA in 2016.       Patient has a positive RA factor and fibromyalgia and follows with Dr Rodriguez for the effect of plaquenil on her retina.    She was last seen in clinic in July 2022.  Today, she complains of profound fatigue and exertional dyspnea with chest pain limiting her activity quite a bit.  Denies coughing or wheezing.  No URI symptoms.  She knows occasional swelling in her bilateral arms and legs, she thinks it is related to the warmer weather.  She also reports intermittent discomfort across her chest and in the bilateral upper arms and thighs.  The symptoms have been ongoing for several months with no recent worsening.  She denies fevers, chills, palpitations or dizziness.    PAST MEDICAL HISTORY:  Past Medical History:   Diagnosis Date     Abnormal glandular Papanicolaou smear of cervix 1992     Allergic rhinitis     Allergy, airborne subst     Arthritis      ASCVD (arteriosclerotic cardiovascular disease)      Chronic pain      Chronic pancreatitis (H)     idiopathic, Tx: PPI, antioxidants, pancreatic enzymes     Common migraine      Coronary artery disease      Costochondritis      Difficulty in walking(719.7)      Dyspnea on exertion      Ectasia, mammary duct     [facility], persistent nipple discharge     Elevated fasting glucose      Gastro-oesophageal reflux disease      Granulomatosis with polyangiitis (H)      Hernia      History of angina       Hyperlipidemia LDL goal < 100      Hypertension goal BP (blood pressure) < 140/90     Essential hypertension     Iron deficiency anemia      Ischemic cardiomyopathy      Menorrhagia      Migraine headaches      Mild persistent asthma      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 Matias, neg w/u, Rx'd w/prednisone     Ventral hernia, unspecified, without mention of obstruction or gangrene 07/2012       CURRENT MEDICATIONS:  Current Outpatient Medications   Medication Sig Dispense Refill     acetaminophen (TYLENOL) 325 MG tablet Take 1-2 tablets (325-650 mg) by mouth every 6 hours as needed for pain (headache) 250 tablet 4     ADVAIR DISKUS 250-50 MCG/ACT inhaler Inhale 1 puff into the lungs 2 times daily       albuterol (2.5 MG/3ML) 0.083% neb solution INL 1 VIAL VIA NEBULIZATION Q 4 TO 6 HOURS PRN  1     albuterol (PROAIR HFA, PROVENTIL HFA, VENTOLIN HFA) 108 (90 BASE) MCG/ACT inhaler Inhale 2 puffs into the lungs every 6 hours as needed for shortness of breath / dyspnea or wheezing 3 Inhaler 1     BANOPHEN 25 MG tablet Take 25 mg by mouth At Bedtime       blood glucose (NO BRAND SPECIFIED) lancets standard Use to test blood sugar 1-4 times daily or as directed. 400 each 3     blood glucose (NO BRAND SPECIFIED) test strip Use to test blood sugar 1-4 times daily  or as directed. 400 strip 3     Blood Pressure Monitor KIT 1 each daily Monitor home blood pressure as instructed by physician.  Dispense [facility] blood pressure kit. 1 kit 0     calcium carbonate (TUMS) 500 MG chewable tablet Take 3-4 tablets (1,500-2,000 mg) by mouth daily as needed 90 tablet 3     clopidogrel (PLAVIX) 75 MG tablet Take 1 tablet (75 mg) by mouth daily . 30-day refills only. (Patient taking differently: Take 75 mg by mouth At Bedtime . 30-day refills only.) 30 tablet 11     cyclobenzaprine (FLEXERIL) 10 MG tablet Take 1 tablet (10 mg) by mouth 2 times daily as needed for muscle spasms 60 tablet 3     dicyclomine (BENTYL) 20 MG tablet TAKE 1 TABLET(20 MG) BY MOUTH FOUR TIMES DAILY AS NEEDED. 60 tablet 0     empagliflozin (JARDIANCE) 10 MG TABS tablet Take 2 tablets (20 mg) by mouth daily (Patient taking differently: Take 20 mg by mouth At Bedtime) 90 tablet 3     EPIPEN 2-RIKY 0.3 MG/0.3ML injection INJECT 0.3 MG INTO THE MUSCLE PRF ANAPHYALAXIS  0     esomeprazole (NEXIUM) 40 MG DR capsule Take 1 capsule (40 mg) by mouth 2 times daily Take 30-60 minutes before eating. (Patient taking differently: Take 40 mg by mouth as needed Take 30-60 minutes before eating.) 180 capsule 0     estradiol (VAGIFEM) 10 MCG TABS vaginal tablet Place 1 tablet (10 mcg) vaginally twice a week 24 tablet 3     fluticasone (FLONASE) 50 MCG/ACT nasal spray Spray 1 spray in nostril as needed       folic acid (FOLVITE) 1 MG tablet Take 1 tablet (1 mg) by mouth daily (Patient taking differently: Take 1 mg by mouth At Bedtime) 90 tablet 3     hydrocortisone (CORTAID) 1 % external cream Apply topically 2 times daily (Patient taking differently: Apply topically as needed) 60 g 1     insulin glargine (LANTUS PEN) 100 UNIT/ML pen Inject 65 Units Subcutaneous every morning Or as directed. 75 mL 3     insulin pen needle (BD PEN NEEDLE MARCK 2ND GEN) 32G X 4 MM miscellaneous Use one pen needle daily or as directed. 100 each 3      ketoconazole (NIZORAL) 2 % shampoo Apply topically daily as needed       levocetirizine (XYZAL) 5 MG tablet Take 5 mg by mouth as needed       lisinopril-hydrochlorothiazide (ZESTORETIC) 20-25 MG tablet Take 1 tablet by mouth daily . 30-day refills only. (Patient taking differently: Take 1 tablet by mouth At Bedtime . 30-day refills only.) 30 tablet 11     magnesium 250 MG tablet TAKE 1 TABLET(250 MG) BY MOUTH TWICE DAILY (Patient taking differently: Take 1 tablet by mouth At Bedtime) 60 tablet 3     metFORMIN (GLUCOPHAGE XR) 500 MG 24 hr tablet Take 4 tablets (2,000 mg) by mouth daily (with dinner) (Patient taking differently: Take 2,000 mg by mouth At Bedtime) 360 tablet 3     metoprolol succinate ER (TOPROL XL) 100 MG 24 hr tablet Take 1 tablet (100 mg) by mouth daily . 30-day refills only. (Patient taking differently: Take 100 mg by mouth At Bedtime . 30-day refills only.) 30 tablet 11     Multiple Vitamin (TAB-A-GERALD) TABS Take 1 tablet by mouth daily (Patient taking differently: Take 1 tablet by mouth At Bedtime) 90 tablet 1     nitroGLYcerin (NITROSTAT) 0.4 MG sublingual tablet Place 1 tablet (0.4 mg) under the tongue every 5 minutes as needed for chest pain . After 3 doses, if pain persists call 911. 25 tablet 3     olopatadine (PATANOL) 0.1 % ophthalmic solution Place 1 drop into both eyes as needed       pravastatin (PRAVACHOL) 40 MG tablet Take 1 tablet (40 mg) by mouth daily . 30-day refills only. (Patient taking differently: Take 40 mg by mouth At Bedtime . 30-day refills only.) 30 tablet 11     prochlorperazine (COMPAZINE) 5 MG tablet Take 1 tablet (5 mg) by mouth every 6 hours as needed for nausea or vomiting 60 tablet 3     sennosides (SENOKOT) 8.6 MG tablet 1-2 tabs a day as needed for constipation 60 tablet 1     spironolactone (ALDACTONE) 25 MG tablet Take 1 tablet (25 mg) by mouth daily . Take one additional 0.5 tab daily as needed for weight gain. 45 day refills. (Patient taking differently:  Take 25 mg by mouth At Bedtime . Take one additional 0.5 tab daily as needed for weight gain. 45 day refills.) 45 tablet 11     sucralfate (CARAFATE) 1 GM tablet Take 1 tablet (1 g) by mouth 4 times daily 120 tablet 3     terbinafine (LAMISIL) 1 % external cream Apply topically 2 times daily (Patient taking differently: Apply topically as needed) 42 g 1     traMADol (ULTRAM) 50 MG tablet TAKE 1 TABLET(50 MG) BY MOUTH EVERY 8 HOURS AS NEEDED FOR SEVERE PAIN 60 tablet 3     vitamin D2 (ERGOCALCIFEROL) 65728 units (1250 mcg) capsule Take 1 capsule (50,000 Units) by mouth every 7 days (Patient taking differently: Take 50,000 Units by mouth every 7 days ) 4 capsule 0       PAST SURGICAL HISTORY:  Past Surgical History:   Procedure Laterality Date     C/SECTION, LOW TRANSVERSE  1996         CARDIAC SURGERY      cardiac stent- recent in 16; 4 other stents     COLONOSCOPY N/A 2023    Procedure: COLONOSCOPY, WITH POLYPECTOMY;  Surgeon: Percy Gross MD;  Location: [facility] OR     DILATION AND CURETTAGE N/A 2016    Procedure: DILATION AND CURETTAGE;  Surgeon: Nahed Butler MD;  [facility]     ENDOMETRIAL SAMPLING (BIOPSY) N/A 2023    Procedure: ENDOMETRIAL BIOPSY;  Surgeon: Nahed Butler MD;  [facility]     ESOPHAGOSCOPY, GASTROSCOPY, DUODENOSCOPY (EGD), COMBINED N/A 2022    Procedure: ESOPHAGOGASTRODUODENOSCOPY, WITH BIOPSY;  Surgeon: Enzo Sesay MD;  Location: [facility]     ESOPHAGOSCOPY, GASTROSCOPY, DUODENOSCOPY (EGD), COMBINED N/A 2023    Procedure: ESOPHAGOGASTRODUODENOSCOPY, WITH BIOPSY;  Surgeon: Percy Gross MD;  Location: [facility] OR     EXAM UNDER ANESTHESIA PELVIC N/A 2023    Procedure: EXAM UNDER ANESTHESIA;  Surgeon: Nahed Butler MD;  [facility]     HC UGI ENDOSCOPY W EUS  2008    Dr. Pastrana, possible chronic pancreatitis, fatty liver     [place] [facility] [place]  INTRAUTERINE DEVICE N/A 07/06/2016    Procedure: INSERT INTRAUTERINE DEVICE;  Surgeon: Nahed Butler MD;  [facility]     INT UTERINE FIBRIOD EMBOLIZATION  10/29/2014     LAPAROSCOPIC CHOLECYSTECTOMY  05/28/2008    Dr. Arnol GRUBBS TX, CERVICAL  1992    s/p HUMERA, done at [facility] in [place].     ORBITOTOMY Right 03/15/2016    Procedure: ORBITOTOMY;  Surgeon: Myron Cyr MD;  Location: [facility]     ORBITOTOMY Right 08/04/2017    Procedure: ORBITOTOMY;  RIGHT ORBITOTOMY AND BIOPSY;  Surgeon: Charis Holbrook MD;  Location: [facility]     REMOVE INTRAUTERINE DEVICE N/A 4/28/2023    Procedure: Remove intrauterine device,;  Surgeon: Nahed Butler MD;  [facility]     REPAIR PTOSIS Right 11/17/2017    Procedure: REPAIR PTOSIS;  RIGHT UPPER LID PTOSIS REPAIR;  Surgeon: Myron Cyr MD;  Location: [facility]     UPPER GI ENDOSCOPY  10/21/2008    mild gastritis, Dr. Rocky [facility] ECHO HEART XTHORACIC,COMPLETE, W/O DOPPLER  02/04/2004    [facility] WNL, EF 60%       ALLERGIES     Allergies   Allergen Reactions     Amoxicillin-Pot Clavulanate      Unknown possible hives and edema     Amoxicillin-Pot Clavulanate      Artificial Sweetner (Informational Only)  Other (See Comments)     Increased headache     Azithromycin      Clavulanic Acid      Diatrizoate Other (See Comments)     Pt wants this listed because she is allergic to shellfish      Imitrex [Triptans]      Severe face/neck/chest tightness and flushing side effects      Penicillins Hives     Unknown      Pork Allergy      Stomach pain, cramping, diarrhea, itching, nausea and headaches     Shellfish Allergy Hives and Swelling     Shellfish-Derived Products      Sulfa Antibiotics Hives and Swelling     Sulfatolamide      Zithromax [Azithromycin Dihydrate] Swelling     Unknown        FAMILY HISTORY:  Family History   Problem Relation Age of Onset     Hypertension Mother      Arthritis Mother      Heart Disease Mother          long qt syndrome     Thyroid Disease Mother      C.A.D. Mother      Heart Disease Father 50        heart attack     Cerebrovascular Disease Father      Diabetes Father      Hypertension Father      Depression Father      C.A.D. Father      Neurologic Disorder Sister         migraines     Neurologic Disorder Sister         migraines     Heart Disease Brother 15        MI at 15, 16.      Arthritis Brother         he passed away, had severe arthritis at age 11     C.A.D. Brother      Anesthesia Reaction Son         PONV     Respiratory Son         asthma     Hypertension Maternal Aunt      Diabetes Maternal Uncle      Hypertension Maternal Uncle      Arthritis Maternal Uncle      Eye Disorder Maternal Uncle         cataracts     Cerebrovascular Disease Maternal Uncle      Family History Negative Other         neg for RA, SLE     Unknown/Adopted No family hx of         unknown neurological issues in her family, mother was adopted     Skin Cancer No family hx of         No known family hx of skin cancer     Deep Vein Thrombosis (DVT) No family hx of        SOCIAL HISTORY:  Social History     Socioeconomic History     Marital status: Single     Spouse name: None     Number of children: 1     Years of education: None     Highest education level: None   Occupational History     Employer: NONE    Tobacco Use     Smoking status: Current Some Day Smoker     Packs/day: 0.20     Years: 1.00     Pack years: 0.20     Types: Cigarettes     Last attempt to quit: 2016     Years since quittin.4     Smokeless tobacco: Never Used   Substance and Sexual Activity     Alcohol use: No     Alcohol/week: 0.0 standard drinks     Drug use: No     Sexual activity: Not Currently   Other Topics Concern     Parent/sibling w/ CABG, MI or angioplasty before 65F 55M? Yes   Social History Narrative    Balanced Diet - sometimes    Osteoporosis Prevention Measures - Dairy servings per day: 2 servings weekly    Regular Exercise -  Yes  "Describe walking 4 times a week    Dental Exam - NO    Seatbelts used - Yes    Self Breast Exam - Yes    Abuse: Current or Past (Physical, Sexual or Emotional)- No    Do you have any concerns about STD's -  No    Do you feel safe in your environment - No    Guns stored in the home - No    Sunscreen used - Yes    Lipids -  YES - Date: 053102    Glucose -  YES - Date: 012804    Eye Exam - YES - Date: one year ago    Colon Cancer Screening - No    Hemoccults - NO    Pap Test -  YES - Date: 070904, remote history of LEEP    Mammography - YES - Date: last spring, would like to discuss, needs a referral to [facility]    DEXA - NO    Immunizations reviewed and up to date - Yes, last td given in 1997 or 1998       ROS:   Constitutional: No fever, chills, or sweats. No weight gain/loss   ENT: No visual disturbance, ear ache, epistaxis, sore throat  Allergies/Immunologic: Negative.   Respiratory: No cough, hemoptysia  Cardiovascular: As per HPI  GI: No nausea, vomiting, hematemesis, melena, or hematochezia  : No urinary frequency, dysuria, or hematuria  Integument: Negative  Psychiatric: Negative  Neuro: Negative  Endocrinology: Negative   Musculoskeletal: Negative    EXAM:  /73 (BP Location: Right arm, Patient Position: Chair, Cuff Size: Adult Regular)   Pulse 85   Ht 1.575 m (5' 2.01\")   Wt 76.1 kg (167 lb 12.8 oz)   SpO2 98%   BMI 30.68 kg/m    In general, the patient is a pleasant female in no apparent distress.    HEENT: NC/AT.  PERRLA.  EOMI.  Sclerae white, not injected.  Nares clear.  Pharynx without erythema or exudate.  Dentition intact.    Neck: No adenopathy.  No thyromegaly. Carotids +4/4 bilaterally without bruits.  No jugular venous distension.   Heart: RRR. Normal S1, S2 splits physiologically. No murmur, rub, click, or gallop. The PMI is in the 5th ICS in the midclavicular line. There is no heave.    Lungs: CTA.  No ronchi, wheezes, rales.  No dullness to percussion.   Abdomen: " Soft, nontender, nondistended. No organomegaly.  No bruits.   Extremities: No clubbing, cyanosis, or edema.  The pulses are +4/4 at the radial, brachial, femoral, popliteal, DP, and PT sites bilaterally.  No bruits are noted.  Neurologic: Alert and oriented to person/place/time, normal speech, gait and affect  Skin: No petechiae, purpura or rash.    Labs:  LIPID RESULTS:  Lab Results   Component Value Date    CHOL 109 02/08/2022    CHOL 102 06/18/2020    HDL 38 (L) 02/08/2022    HDL 30 (L) 06/18/2020    LDL 43 02/08/2022    LDL 50 06/18/2020    TRIG 141 02/08/2022    TRIG 104 06/18/2020    CHOLHDLRATIO 3.5 07/29/2015    NHDL 71 02/08/2022    NHDL 71 06/18/2020       LIVER ENZYME RESULTS:  Lab Results   Component Value Date    AST 23 06/21/2023    AST 20 05/28/2021    ALT 27 06/21/2023    ALT 28 05/28/2021       CBC RESULTS:  Lab Results   Component Value Date    WBC 15.5 (H) 06/21/2023    WBC 9.0 05/28/2021    RBC 5.72 (H) 06/21/2023    RBC 4.74 05/28/2021    HGB 14.4 06/21/2023    HGB 12.5 05/28/2021    HCT 44.1 06/21/2023    HCT 36.7 05/28/2021    MCV 77 (L) 06/21/2023    MCV 77 (L) 05/28/2021    MCH 25.2 (L) 06/21/2023    MCH 26.4 (L) 05/28/2021    MCHC 32.7 06/21/2023    MCHC 34.1 05/28/2021    RDW 14.6 06/21/2023    RDW 12.8 05/28/2021     06/21/2023     05/28/2021       BMP RESULTS:  Lab Results   Component Value Date     06/21/2023     05/28/2021    POTASSIUM 4.1 06/21/2023    POTASSIUM 3.9 07/08/2022    POTASSIUM 3.5 05/28/2021    CHLORIDE 100 06/21/2023    CHLORIDE 108 07/08/2022    CHLORIDE 106 05/28/2021    CO2 22 06/21/2023    CO2 25 07/08/2022    CO2 27 05/28/2021    ANIONGAP 15 06/21/2023    ANIONGAP 10 07/08/2022    ANIONGAP 5 05/28/2021     (H) 06/21/2023     (H) 05/25/2023     (H) 07/08/2022     (H) 05/28/2021    BUN 24.3 (H) 06/21/2023    BUN 17 07/08/2022    BUN 12 05/28/2021    CR 1.14 (H) 06/21/2023    CR 1.00 05/28/2021    GFRESTIMATED 56  (L) 06/21/2023    GFRESTIMATED 64 05/28/2021    GFRESTBLACK 74 05/28/2021    KATHY 10.0 06/21/2023    KATHY 9.4 05/28/2021        A1C RESULTS:  Lab Results   Component Value Date    A1C 9.5 (H) 06/21/2023    A1C 8.0 (H) 06/18/2020       INR RESULTS:  Lab Results   Component Value Date    INR 0.97 08/25/2016    INR 0.98 05/20/2015     Imaging Echocardiogram 06-29-23    Global and regional left ventricular function is normal with an EF of 55-60%.  Global right ventricular function is normal. The right ventricle is normal  size.  No significant valvular abnormalities.  IVC diameter <2.1 cm collapsing >50% with sniff suggests a normal RA pressure  of 3 mmHg.  This study was compared with the study from 05/25/2021. No significant changes  noted.  ______________________________________________________________________________  Left Ventricle  Global and regional left ventricular function is normal with an EF of 55-60%.  Left ventricular size is normal. Relative wall thickness is increased  consistent with concentric remodeling. Left ventricular diastolic function is  indeterminate.     Right Ventricle  Global right ventricular function is normal. The right ventricle is normal  size.     Atria  Both atria appear normal.     Mitral Valve  Mild mitral annular calcification is present. Trace mitral insufficiency is  present.     Aortic Valve  The valve leaflets are not well visualized. On Doppler interrogation, there is  no significant stenosis or regurgitation.     Tricuspid Valve  The valve leaflets are not well visualized. Trace tricuspid insufficiency is  present. Pulmonary artery systolic pressure cannot be assessed.     Pulmonic Valve  The valve leaflets are not well visualized. Trace pulmonic insufficiency is  present.     Vessels  Sinuses of Valsalva 3.1 cm. Ascending aorta 2.9 cm. IVC diameter <2.1 cm  collapsing >50% with sniff suggests a normal RA pressure of 3 mmHg.     Pericardium  No pericardial effusion is  present.     Compared to Previous Study  This study was compared with the study from 2021 . No significant  changes noted.  ______________________________________________________________________________  MMode/2D Measurements & Calculations     IVSd: 1.0 cm  LVIDd: 3.9 cm  LVIDs: 2.5 cm  LVPWd: 1.0 cm  FS: 35.7 %  LV mass(C)d: 125.6 grams  LV mass(C)dI: 71.3 grams/m2  Ao root diam: 3.1 cm  asc Aorta Diam: 2.9 cm  LVOT diam: 2.0 cm  LVOT area: 3.1 cm2  LA Volume (BP): 34.4 ml  LA Volume Index (BP): 19.5 ml/m2  RV Base: 2.4 cm     RWT: 0.51  TAPSE: 2.0 cm     Doppler Measurements & Calculations  MV E max clay: 55.3 cm/sec  MV A max clay: 86.5 cm/sec  MV E/A: 0.64  MV dec time: 0.18 sec  LV V1 max PG: 3.9 mmHg  LV V1 max: 99.2 cm/sec  LV V1 VTI: 19.5 cm  SV(LVOT): 59.8 ml  SI(LVOT): 33.9 ml/m2  E/E' av.1  Lateral E/e': 10.4  Medial E/e': 13.7  RV S Clay: 13.5 cm/sec           Assessment and Plan:     I discussed the results with patient:     # Dyslipidemia  # HTN  # CAD S/P NSTEMI s/p KATHERINE to the mLAD in  and RCA in 2016  # DM2  # Hypothyroidism  # PCOS  # RA  # Fibromyalgia     Low suspicion for cardiac etiology for her profound fatigue and exertional dyspnea.  Transthoracic echocardiogram today shows normal biventricular function with no significant valvular abnormalities.  Blood pressure is well controlled on her current regimen.    We discussed the results with patient.  We discussed the importance of a heart healthy diet and lifestyle.  We discussed following points with patient:       Follow the American Heart Association Diet and Lifestyle Recommendations:  -Limit saturated fat, trans fat, sodium, red meat, sweets and sugar-sweetened beverages. If you choose to eat red meat, compare labels and select the leanest cuts available    Medication Changes: None     Follow Up: In 1 year with fasting labs and echocardiogram prior    Discussed with Dr. Kobi Patrick MD  Cardiology Fellow    I saw and  evaluated patient with CV fellow. I examined patient with CV fellow. I discussed the results with patient and CV fellow. I discussed our plan with patient and CV fellow.  I agree with CV fellow's note and I edited the CV fellow's note to make it a more comprehensive document.    Milan Peace MD, PhD  Professor of Medicine  Division of Cardiology      Please do not hesitate to contact me if you have any questions/concerns.     Sincerely,     Milan Peace MD

## 2023-06-29 NOTE — PROGRESS NOTES
HPI:   I had the privilege to evaluate and examine Ms Janine Cornell, who is a 58 yr -American patient with DM2, Hypothyroidism, PCOS, Dyslipidemia, HTN, and CAD S/P NSTEMI s/p KATHERINE to the mLAD in 2011 and RCA in 2016.       Patient has a positive RA factor and fibromyalgia and follows with Dr Rodriguez for the effect of plaquenil on her retina.    She was last seen in clinic in July 2022.  Today, she complains of profound fatigue and exertional dyspnea with chest pain limiting her activity quite a bit.  Denies coughing or wheezing.  No URI symptoms.  She knows occasional swelling in her bilateral arms and legs, she thinks it is related to the warmer weather.  She also reports intermittent discomfort across her chest and in the bilateral upper arms and thighs.  The symptoms have been ongoing for several months with no recent worsening.  She denies fevers, chills, palpitations or dizziness.    PAST MEDICAL HISTORY:  Past Medical History:   Diagnosis Date     Abnormal glandular Papanicolaou smear of cervix 1992     Allergic rhinitis     Allergy, airborne subst     Arthritis      ASCVD (arteriosclerotic cardiovascular disease)      Chronic pain      Chronic pancreatitis (H)     idiopathic, Tx: PPI, antioxidants, pancreatic enzymes     Common migraine      Coronary artery disease      Costochondritis      Difficulty in walking(719.7)      Dyspnea on exertion      Ectasia, mammary duct     [facility], persistent nipple discharge     Elevated fasting glucose      Gastro-oesophageal reflux disease      Granulomatosis with polyangiitis (H)      Hernia      History of angina      Hyperlipidemia LDL goal < 100      Hypertension goal BP (blood pressure) < 140/90     Essential hypertension     Iron deficiency anemia      Ischemic cardiomyopathy      Menorrhagia      Migraine headaches      Mild persistent asthma      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 Matias, neg w/u, Rx'd w/prednisone     Ventral hernia, unspecified, without mention of obstruction or gangrene 07/2012       CURRENT MEDICATIONS:  Current Outpatient Medications   Medication Sig Dispense Refill     acetaminophen (TYLENOL) 325 MG tablet Take 1-2 tablets (325-650 mg) by mouth every 6 hours as needed for pain (headache) 250 tablet 4     ADVAIR DISKUS 250-50 MCG/ACT inhaler Inhale 1 puff into the lungs 2 times daily       albuterol (2.5 MG/3ML) 0.083% neb solution INL 1 VIAL VIA NEBULIZATION Q 4 TO 6 HOURS PRN  1     albuterol (PROAIR HFA, PROVENTIL HFA, VENTOLIN HFA) 108 (90 BASE) MCG/ACT inhaler Inhale 2 puffs into the lungs every 6 hours as needed for shortness of breath / dyspnea or wheezing 3 Inhaler 1     BANOPHEN 25 MG tablet Take 25 mg by mouth At Bedtime       blood glucose (NO BRAND SPECIFIED) lancets standard Use to test blood sugar 1-4 times daily or as directed. 400 each 3     blood glucose (NO BRAND SPECIFIED) test strip Use to test blood sugar 1-4 times daily or as directed. 400 strip 3     Blood Pressure Monitor KIT 1 each daily Monitor home blood pressure as instructed by physician.  Dispense [place] blood pressure kit. 1 kit 0     calcium carbonate (TUMS) 500 MG chewable tablet Take 3-4 tablets (1,500-2,000 mg) by mouth daily as needed 90 tablet 3     clopidogrel (PLAVIX) 75 MG tablet  Take 1 tablet (75 mg) by mouth daily . 30-day refills only. (Patient taking differently: Take 75 mg by mouth At Bedtime . 30-day refills only.) 30 tablet 11     cyclobenzaprine (FLEXERIL) 10 MG tablet Take 1 tablet (10 mg) by mouth 2 times daily as needed for muscle spasms 60 tablet 3     dicyclomine (BENTYL) 20 MG tablet TAKE 1 TABLET(20 MG) BY MOUTH FOUR TIMES DAILY AS NEEDED. 60 tablet 0     empagliflozin (JARDIANCE) 10 MG TABS tablet Take 2 tablets (20 mg) by mouth daily (Patient taking differently: Take 20 mg by mouth At Bedtime) 90 tablet 3     EPIPEN 2-RIKY 0.3 MG/0.3ML injection INJECT 0.3 MG INTO THE MUSCLE PRF ANAPHYALAXIS  0     esomeprazole (NEXIUM) 40 MG DR capsule Take 1 capsule (40 mg) by mouth 2 times daily Take 30-60 minutes before eating. (Patient taking differently: Take 40 mg by mouth as needed Take 30-60 minutes before eating.) 180 capsule 0     estradiol (VAGIFEM) 10 MCG TABS vaginal tablet Place 1 tablet (10 mcg) vaginally twice a week 24 tablet 3     fluticasone (FLONASE) 50 MCG/ACT nasal spray Spray 1 spray in nostril as needed       folic acid (FOLVITE) 1 MG tablet Take 1 tablet (1 mg) by mouth daily (Patient taking differently: Take 1 mg by mouth At Bedtime) 90 tablet 3     hydrocortisone (CORTAID) 1 % external cream Apply topically 2 times daily (Patient taking differently: Apply topically as needed) 60 g 1     insulin glargine (LANTUS PEN) 100 UNIT/ML pen Inject 65 Units Subcutaneous every morning Or as directed. 75 mL 3     insulin pen needle (BD PEN NEEDLE MARCK 2ND GEN) 32G X 4 MM miscellaneous Use one pen needle daily or as directed. 100 each 3     ketoconazole (NIZORAL) 2 % shampoo Apply topically daily as needed       levocetirizine (XYZAL) 5 MG tablet Take 5 mg by mouth as needed       lisinopril-hydrochlorothiazide (ZESTORETIC) 20-25 MG tablet Take 1 tablet by mouth daily . 30-day refills only. (Patient taking differently: Take 1 tablet by mouth At Bedtime . 30-day refills only.)  30 tablet 11     magnesium 250 MG tablet TAKE 1 TABLET(250 MG) BY MOUTH TWICE DAILY (Patient taking differently: Take 1 tablet by mouth At Bedtime) 60 tablet 3     metFORMIN (GLUCOPHAGE XR) 500 MG 24 hr tablet Take 4 tablets (2,000 mg) by mouth daily (with dinner) (Patient taking differently: Take 2,000 mg by mouth At Bedtime) 360 tablet 3     metoprolol succinate ER (TOPROL XL) 100 MG 24 hr tablet Take 1 tablet (100 mg) by mouth daily . 30-day refills only. (Patient taking differently: Take 100 mg by mouth At Bedtime . 30-day refills only.) 30 tablet 11     Multiple Vitamin (TAB-A-GERALD) TABS Take 1 tablet by mouth daily (Patient taking differently: Take 1 tablet by mouth At Bedtime) 90 tablet 1     nitroGLYcerin (NITROSTAT) 0.4 MG sublingual tablet Place 1 tablet (0.4 mg) under the tongue every 5 minutes as needed for chest pain . After 3 doses, if pain persists call 911. 25 tablet 3     olopatadine (PATANOL) 0.1 % ophthalmic solution Place 1 drop into both eyes as needed       pravastatin (PRAVACHOL) 40 MG tablet Take 1 tablet (40 mg) by mouth daily . 30-day refills only. (Patient taking differently: Take 40 mg by mouth At Bedtime . 30-day refills only.) 30 tablet 11     prochlorperazine (COMPAZINE) 5 MG tablet Take 1 tablet (5 mg) by mouth every 6 hours as needed for nausea or vomiting 60 tablet 3     sennosides (SENOKOT) 8.6 MG tablet 1-2 tabs a day as needed for constipation 60 tablet 1     spironolactone (ALDACTONE) 25 MG tablet Take 1 tablet (25 mg) by mouth daily . Take one additional 0.5 tab daily as needed for weight gain. 45 day refills. (Patient taking differently: Take 25 mg by mouth At Bedtime . Take one additional 0.5 tab daily as needed for weight gain. 45 day refills.) 45 tablet 11     sucralfate (CARAFATE) 1 GM tablet Take 1 tablet (1 g) by mouth 4 times daily 120 tablet 3     terbinafine (LAMISIL) 1 % external cream Apply topically 2 times daily (Patient taking differently: Apply topically as  needed) 42 g 1     traMADol (ULTRAM) 50 MG tablet TAKE 1 TABLET(50 MG) BY MOUTH EVERY 8 HOURS AS NEEDED FOR SEVERE PAIN 60 tablet 3     vitamin D2 (ERGOCALCIFEROL) 73444 units (1250 mcg) capsule Take 1 capsule (50,000 Units) by mouth every 7 days (Patient taking differently: Take 50,000 Units by mouth every 7 days ) 4 capsule 0       PAST SURGICAL HISTORY:  Past Surgical History:   Procedure Laterality Date     C/SECTION, LOW TRANSVERSE  1996         CARDIAC SURGERY      cardiac stent- recent in 16; 4 other stents     COLONOSCOPY N/A 2023    Procedure: COLONOSCOPY, WITH POLYPECTOMY;  Surgeon: Percy Gross MD;  Location: [facility] OR     DILATION AND CURETTAGE N/A 2016    Procedure: DILATION AND CURETTAGE;  Surgeon: Nahed Butler MD;  [facility]     ENDOMETRIAL SAMPLING (BIOPSY) N/A 2023    Procedure: ENDOMETRIAL BIOPSY;  Surgeon: Nahed Butler MD;  [facility]     ESOPHAGOSCOPY, GASTROSCOPY, DUODENOSCOPY (EGD), COMBINED N/A 2022    Procedure: ESOPHAGOGASTRODUODENOSCOPY, WITH BIOPSY;  Surgeon: Enzo Sesay MD;  Location: [facility]     ESOPHAGOSCOPY, GASTROSCOPY, DUODENOSCOPY (EGD), COMBINED N/A 2023    Procedure: ESOPHAGOGASTRODUODENOSCOPY, WITH BIOPSY;  Surgeon: Percy Gross MD;  Location: [facility] OR     EXAM UNDER ANESTHESIA PELVIC N/A 2023    Procedure: EXAM UNDER ANESTHESIA;  Surgeon: Nahed Butler MD;  [facility]     HC UGI ENDOSCOPY W EUS  2008    Dr. Pastrana, possible chronic pancreatitis, fatty liver     HERNIA REPAIR  2012    done at [facility]     INSERT INTRAUTERINE DEVICE N/A 2016    Procedure: INSERT INTRAUTERINE DEVICE;  Surgeon: Nahed Butler MD;  [facility]     INT UTERINE FIBRIOD EMBOLIZATION  10/29/2014     LAPAROSCOPIC CHOLECYSTECTOMY  2008    Dr. Arnol GRUBBS TX, CERVICAL  1992    s/p LEEP, done at [facility] in [place].     ORBITOTOMY Right  03/15/2016    Procedure: ORBITOTOMY;  Surgeon: Myron Cyr MD;  Location: [facility]     ORBITOTOMY Right 08/04/2017    Procedure: ORBITOTOMY;  RIGHT ORBITOTOMY AND BIOPSY;  Surgeon: Charis Holbrook MD;  Location: [facility]     REMOVE INTRAUTERINE DEVICE N/A 4/28/2023    Procedure: Remove intrauterine device,;  Surgeon: Nahed Butler MD;  [facility]     REPAIR PTOSIS Right 11/17/2017    Procedure: REPAIR PTOSIS;  RIGHT UPPER LID PTOSIS REPAIR;  Surgeon: Myron Cyr MD;  Location: [facility]     UPPER GI ENDOSCOPY  10/21/2008    mild gastritis, Dr. Rocky [facility] ECHO HEART XTHORACIC,COMPLETE, W/O DOPPLER  02/04/2004    [facility] WNL, EF 60%       ALLERGIES     Allergies   Allergen Reactions     Amoxicillin-Pot Clavulanate      Unknown possible hives and edema     Amoxicillin-Pot Clavulanate      Artificial Sweetner (Informational Only)  Other (See Comments)     Increased headache     Azithromycin      Clavulanic Acid      Diatrizoate Other (See Comments)     Pt wants this listed because she is allergic to shellfish      Imitrex [Triptans]      Severe face/neck/chest tightness and flushing side effects      Penicillins Hives     Unknown      Pork Allergy      Stomach pain, cramping, diarrhea, itching, nausea and headaches     Shellfish Allergy Hives and Swelling     Shellfish-Derived Products      Sulfa Antibiotics Hives and Swelling     Sulfatolamide      Zithromax [Azithromycin Dihydrate] Swelling     Unknown        FAMILY HISTORY:  Family History   Problem Relation Age of Onset     Hypertension Mother      Arthritis Mother      Heart Disease Mother         long qt syndrome     Thyroid Disease Mother      C.A.D. Mother      Heart Disease Father 50        heart attack     Cerebrovascular Disease Father      Diabetes Father      Hypertension Father      Depression Father      C.A.D. Father      Neurologic Disorder Sister         migraines     Neurologic Disorder Sister         migraines      Heart Disease Brother 15        MI at 15, 16.      Arthritis Brother         he passed away, had severe arthritis at age 11     C.A.D. Brother      Anesthesia Reaction Son         PONV     Respiratory Son         asthma     Hypertension Maternal Aunt      Diabetes Maternal Uncle      Hypertension Maternal Uncle      Arthritis Maternal Uncle      Eye Disorder Maternal Uncle         cataracts     Cerebrovascular Disease Maternal Uncle      Family History Negative Other         neg for RA, SLE     Unknown/Adopted No family hx of         unknown neurological issues in her family, mother was adopted     Skin Cancer No family hx of         No known family hx of skin cancer     Deep Vein Thrombosis (DVT) No family hx of        SOCIAL HISTORY:  Social History     Socioeconomic History     Marital status: Single     Spouse name: None     Number of children: 1     Years of education: None     Highest education level: None   Occupational History     Employer: NONE    Tobacco Use     Smoking status: Current Some Day Smoker     Packs/day: 0.20     Years: 1.00     Pack years: 0.20     Types: Cigarettes     Last attempt to quit: 2016     Years since quittin.4     Smokeless tobacco: Never Used   Substance and Sexual Activity     Alcohol use: No     Alcohol/week: 0.0 standard drinks     Drug use: No     Sexual activity: Not Currently   Other Topics Concern     Parent/sibling w/ CABG, MI or angioplasty before 65F 55M? Yes   Social History Narrative    Balanced Diet - sometimes    Osteoporosis Prevention Measures - Dairy servings per day: 2 servings weekly    Regular Exercise -  Yes Describe walking 4 times a week    Dental Exam - NO    Seatbelts used - Yes    Self Breast Exam - Yes    Abuse: Current or Past (Physical, Sexual or Emotional)- No    Do you have any concerns about STD's -  No    Do you feel safe in your environment - No    Guns stored in the home - No    Sunscreen used - Yes    Lipids -  YES - Date:  "675418    Glucose -  YES - Date: 600061    Eye Exam - YES - Date: one year ago    Colon Cancer Screening - No    Hemoccults - NO    Pap Test -  YES - Date: 070904, remote history of LEEP    Mammography - YES - Date: last spring, would like to discuss, needs a referral to [facility] [place]    DEXA - NO    Immunizations reviewed and up to date - Yes, last td given in 1997 or 1998       ROS:   Constitutional: No fever, chills, or sweats. No weight gain/loss   ENT: No visual disturbance, ear ache, epistaxis, sore throat  Allergies/Immunologic: Negative.   Respiratory: No cough, hemoptysia  Cardiovascular: As per HPI  GI: No nausea, vomiting, hematemesis, melena, or hematochezia  : No urinary frequency, dysuria, or hematuria  Integument: Negative  Psychiatric: Negative  Neuro: Negative  Endocrinology: Negative   Musculoskeletal: Negative    EXAM:  /73 (BP Location: Right arm, Patient Position: Chair, Cuff Size: Adult Regular)   Pulse 85   Ht 1.575 m (5' 2.01\")   Wt 76.1 kg (167 lb 12.8 oz)   SpO2 98%   BMI 30.68 kg/m    In general, the patient is a pleasant female in no apparent distress.    HEENT: NC/AT.  PERRLA.  EOMI.  Sclerae white, not injected.  Nares clear.  Pharynx without erythema or exudate.  Dentition intact.    Neck: No adenopathy.  No thyromegaly. Carotids +4/4 bilaterally without bruits.  No jugular venous distension.   Heart: RRR. Normal S1, S2 splits physiologically. No murmur, rub, click, or gallop. The PMI is in the 5th ICS in the midclavicular line. There is no heave.    Lungs: CTA.  No ronchi, wheezes, rales.  No dullness to percussion.   Abdomen: Soft, nontender, nondistended. No organomegaly.  No bruits.   Extremities: No clubbing, cyanosis, or edema.  The pulses are +4/4 at the radial, brachial, femoral, popliteal, DP, and PT sites bilaterally.  No bruits are noted.  Neurologic: Alert and oriented to person/place/time, normal speech, gait and affect  Skin: No petechiae, " purpura or rash.    Labs:  LIPID RESULTS:  Lab Results   Component Value Date    CHOL 109 02/08/2022    CHOL 102 06/18/2020    HDL 38 (L) 02/08/2022    HDL 30 (L) 06/18/2020    LDL 43 02/08/2022    LDL 50 06/18/2020    TRIG 141 02/08/2022    TRIG 104 06/18/2020    CHOLHDLRATIO 3.5 07/29/2015    NHDL 71 02/08/2022    NHDL 71 06/18/2020       LIVER ENZYME RESULTS:  Lab Results   Component Value Date    AST 23 06/21/2023    AST 20 05/28/2021    ALT 27 06/21/2023    ALT 28 05/28/2021       CBC RESULTS:  Lab Results   Component Value Date    WBC 15.5 (H) 06/21/2023    WBC 9.0 05/28/2021    RBC 5.72 (H) 06/21/2023    RBC 4.74 05/28/2021    HGB 14.4 06/21/2023    HGB 12.5 05/28/2021    HCT 44.1 06/21/2023    HCT 36.7 05/28/2021    MCV 77 (L) 06/21/2023    MCV 77 (L) 05/28/2021    MCH 25.2 (L) 06/21/2023    MCH 26.4 (L) 05/28/2021    MCHC 32.7 06/21/2023    MCHC 34.1 05/28/2021    RDW 14.6 06/21/2023    RDW 12.8 05/28/2021     06/21/2023     05/28/2021       BMP RESULTS:  Lab Results   Component Value Date     06/21/2023     05/28/2021    POTASSIUM 4.1 06/21/2023    POTASSIUM 3.9 07/08/2022    POTASSIUM 3.5 05/28/2021    CHLORIDE 100 06/21/2023    CHLORIDE 108 07/08/2022    CHLORIDE 106 05/28/2021    CO2 22 06/21/2023    CO2 25 07/08/2022    CO2 27 05/28/2021    ANIONGAP 15 06/21/2023    ANIONGAP 10 07/08/2022    ANIONGAP 5 05/28/2021     (H) 06/21/2023     (H) 05/25/2023     (H) 07/08/2022     (H) 05/28/2021    BUN 24.3 (H) 06/21/2023    BUN 17 07/08/2022    BUN 12 05/28/2021    CR 1.14 (H) 06/21/2023    CR 1.00 05/28/2021    GFRESTIMATED 56 (L) 06/21/2023    GFRESTIMATED 64 05/28/2021    GFRESTBLACK 74 05/28/2021    KATHY 10.0 06/21/2023    KATHY 9.4 05/28/2021        A1C RESULTS:  Lab Results   Component Value Date    A1C 9.5 (H) 06/21/2023    A1C 8.0 (H) 06/18/2020       INR RESULTS:  Lab Results   Component Value Date    INR 0.97 08/25/2016    INR 0.98 05/20/2015      Imaging Echocardiogram 06-29-23    Global and regional left ventricular function is normal with an EF of 55-60%.  Global right ventricular function is normal. The right ventricle is normal  size.  No significant valvular abnormalities.  IVC diameter <2.1 cm collapsing >50% with sniff suggests a normal RA pressure  of 3 mmHg.  This study was compared with the study from 05/25/2021. No significant changes  noted.  ______________________________________________________________________________  Left Ventricle  Global and regional left ventricular function is normal with an EF of 55-60%.  Left ventricular size is normal. Relative wall thickness is increased  consistent with concentric remodeling. Left ventricular diastolic function is  indeterminate.     Right Ventricle  Global right ventricular function is normal. The right ventricle is normal  size.     Atria  Both atria appear normal.     Mitral Valve  Mild mitral annular calcification is present. Trace mitral insufficiency is  present.     Aortic Valve  The valve leaflets are not well visualized. On Doppler interrogation, there is  no significant stenosis or regurgitation.     Tricuspid Valve  The valve leaflets are not well visualized. Trace tricuspid insufficiency is  present. Pulmonary artery systolic pressure cannot be assessed.     Pulmonic Valve  The valve leaflets are not well visualized. Trace pulmonic insufficiency is  present.     Vessels  Sinuses of Valsalva 3.1 cm. Ascending aorta 2.9 cm. IVC diameter <2.1 cm  collapsing >50% with sniff suggests a normal RA pressure of 3 mmHg.     Pericardium  No pericardial effusion is present.     Compared to Previous Study  This study was compared with the study from 05/25/2021 . No significant  changes noted.  ______________________________________________________________________________  MMode/2D Measurements & Calculations     IVSd: 1.0 cm  LVIDd: 3.9 cm  LVIDs: 2.5 cm  LVPWd: 1.0 cm  FS: 35.7 %  LV mass(C)d:  125.6 grams  LV mass(C)dI: 71.3 grams/m2  Ao root diam: 3.1 cm  asc Aorta Diam: 2.9 cm  LVOT diam: 2.0 cm  LVOT area: 3.1 cm2  LA Volume (BP): 34.4 ml  LA Volume Index (BP): 19.5 ml/m2  RV Base: 2.4 cm     RWT: 0.51  TAPSE: 2.0 cm     Doppler Measurements & Calculations  MV E max clay: 55.3 cm/sec  MV A max clay: 86.5 cm/sec  MV E/A: 0.64  MV dec time: 0.18 sec  LV V1 max PG: 3.9 mmHg  LV V1 max: 99.2 cm/sec  LV V1 VTI: 19.5 cm  SV(LVOT): 59.8 ml  SI(LVOT): 33.9 ml/m2  E/E' av.1  Lateral E/e': 10.4  Medial E/e': 13.7  RV S Clay: 13.5 cm/sec           Assessment and Plan:     I discussed the results with patient:     # Dyslipidemia  # HTN  # CAD S/P NSTEMI s/p KATHERINE to the mLAD in  and RCA in 2016  # DM2  # Hypothyroidism  # PCOS  # RA  # Fibromyalgia     Low suspicion for cardiac etiology for her profound fatigue and exertional dyspnea.  Transthoracic echocardiogram today shows normal biventricular function with no significant valvular abnormalities.  Blood pressure is well controlled on her current regimen.    We discussed the results with patient.  We discussed the importance of a heart healthy diet and lifestyle.  We discussed following points with patient:       Follow the American Heart Association Diet and Lifestyle Recommendations:  -Limit saturated fat, trans fat, sodium, red meat, sweets and sugar-sweetened beverages. If you choose to eat red meat, compare labels and select the leanest cuts available    Medication Changes: None     Follow Up: In 1 year with fasting labs and echocardiogram prior    Discussed with Dr. Kobi Patrick MD  Cardiology Fellow    I saw and evaluated patient with CV fellow. I examined patient with CV fellow. I discussed the results with patient and CV fellow. I discussed our plan with patient and CV fellow.  I agree with CV fellow's note and I edited the CV fellow's note to make it a more comprehensive document.    Milan Peace MD, PhD  Professor of Medicine  Division  of Cardiology

## 2023-06-29 NOTE — NURSING NOTE
Chief Complaint   Patient presents with     Follow Up     Kobi F/U       Vitals were taken, medications reconciled.    Paige Thurner, Facilitator   3:30 PM

## 2023-06-30 LAB
ANCA AB PATTERN SER IF-IMP: NORMAL
C-ANCA TITR SER IF: NORMAL {TITER}
CD19 B CELL COMMENT: ABNORMAL
CD19 CELLS # BLD: <1 CELLS/UL (ref 107–698)
CD19 CELLS NFR BLD: <1 % (ref 6–27)
ELASTASE PANC STL-MCNT: 206 UG/G
IGA SERPL-MCNC: 187 MG/DL (ref 84–499)
IGG SERPL-MCNC: 630 MG/DL (ref 610–1616)
IGM SERPL-MCNC: 28 MG/DL (ref 35–242)
O+P STL MICRO: NEGATIVE
PROT ELPH PNL UR ELPH: NORMAL

## 2023-07-07 ENCOUNTER — LAB (OUTPATIENT)
Dept: LAB | Facility: CLINIC | Age: 58
End: 2023-07-07
Payer: COMMERCIAL

## 2023-07-07 ENCOUNTER — OFFICE VISIT (OUTPATIENT)
Dept: FAMILY MEDICINE | Facility: CLINIC | Age: 58
End: 2023-07-07
Payer: COMMERCIAL

## 2023-07-07 VITALS
WEIGHT: 169.8 LBS | BODY MASS INDEX: 31.25 KG/M2 | HEART RATE: 79 BPM | HEIGHT: 62 IN | DIASTOLIC BLOOD PRESSURE: 73 MMHG | SYSTOLIC BLOOD PRESSURE: 111 MMHG | OXYGEN SATURATION: 98 %

## 2023-07-07 DIAGNOSIS — M79.2 NEURALGIA: ICD-10-CM

## 2023-07-07 DIAGNOSIS — R30.0 DYSURIA: ICD-10-CM

## 2023-07-07 DIAGNOSIS — I77.82 ANCA-ASSOCIATED VASCULITIS (H): ICD-10-CM

## 2023-07-07 DIAGNOSIS — B37.0 THRUSH: Primary | ICD-10-CM

## 2023-07-07 DIAGNOSIS — D72.829 LEUKOCYTOSIS, UNSPECIFIED TYPE: ICD-10-CM

## 2023-07-07 LAB
ALBUMIN UR-MCNC: NEGATIVE MG/DL
APPEARANCE UR: CLEAR
BILIRUB UR QL STRIP: NEGATIVE
COLOR UR AUTO: ABNORMAL
GLUCOSE UR STRIP-MCNC: >=1000 MG/DL
HGB UR QL STRIP: NEGATIVE
KETONES UR STRIP-MCNC: NEGATIVE MG/DL
LEUKOCYTE ESTERASE UR QL STRIP: NEGATIVE
NITRATE UR QL: NEGATIVE
PH UR STRIP: 6.5 [PH] (ref 5–7)
SP GR UR STRIP: 1.02 (ref 1–1.03)
UROBILINOGEN UR STRIP-MCNC: NORMAL MG/DL

## 2023-07-07 PROCEDURE — 81003 URINALYSIS AUTO W/O SCOPE: CPT | Performed by: PATHOLOGY

## 2023-07-07 PROCEDURE — 99215 OFFICE O/P EST HI 40 MIN: CPT | Performed by: FAMILY MEDICINE

## 2023-07-07 RX ORDER — METHYLPREDNISOLONE 4 MG
TABLET, DOSE PACK ORAL
Qty: 21 TABLET | Refills: 0 | Status: SHIPPED | OUTPATIENT
Start: 2023-07-07 | End: 2024-02-12

## 2023-07-07 RX ORDER — CLOTRIMAZOLE 10 MG/1
10 LOZENGE ORAL
Qty: 50 LOZENGE | Refills: 1 | Status: SHIPPED | OUTPATIENT
Start: 2023-07-07

## 2023-07-07 NOTE — PROGRESS NOTES
"  Assessment & Plan     Thrush    - clotrimazole (MYCELEX) 10 MG lozenge; Place 1 lozenge (10 mg) inside cheek 5 times daily    ANCA-associated vasculitis (H)  Likely cause of hands/feet flare of pain and stiffness  - methylPREDNISolone (MEDROL DOSEPAK) 4 MG tablet therapy pack; Follow Package Directions    Dysuria  Negative  - UA Macroscopic with reflex to Microscopic and Culture - Lab Collect; Future    Leukocytosis, unspecified type  Unclear cause. I suggest see ID in case overlooked infxn. She'd like to wait. Will do abd MR DEL ROSARIO ordered w/ Rheum visit  - CBC with platelets and differential; Future    Neuralgia  See Neuroleandro PN DM but does have vasculitis        43 minutes spent by me on the date of the encounter doing chart review, history and exam, documentation and further activities per the note    BMI:   Estimated body mass index is 31.06 kg/m  as calculated from the following:    Height as of this encounter: 1.575 m (5' 2\").    Weight as of this encounter: 77 kg (169 lb 12.8 oz).     Return in about 1 month (around 8/7/2023).    Kash Solano MD  [facility] [place]    Adrienne Mehta is a 58 year old, presenting for the following health issues:  Follow Up (Pt reports edema in hands and feet; sore tongue)    HPI Here in f/u  Currently, some am nausea otherwise no n/v, abd pain gone (better post started probioticis), diarrhea resolved (better on probiiotics too). Reviewed reason to do MR abdomen, pancreas work up never completed after 2021 admit. She[place] do [place] [facility] [place].  Endo sees in a mo DM likely virtual    Lately hands/feet sore joints, puffy, stiff. Has rheumatologic disorder. I'll reach out to Rheum for ideas and when to dose rituxan next (pt not sure of plan), but for today rx medrol, she knows can increase sugars.    CBC: reviewed hi, unclear why. ID ROS today neg except mild dysuria. I suggest ID consult, pt would like to hold off on that. " Has been hi for some months, during which cxr/abd ct unrevealing, did not improve post Gyn procedure.    H/o thrush. Back. Has DM. On advair. Immune system impacted by diagnosis.     Defers covid shots.    PN sx persist. My labs non contributory. Will ask to see Neuro.     Next viist will discuss shingrix/Prev20.    Past Medical History:   Diagnosis Date     Abnormal glandular Papanicolaou smear of cervix 1992     Allergic rhinitis     Allergy, airborne subst     Arthritis      ASCVD (arteriosclerotic cardiovascular disease)      Chronic pain      Chronic pancreatitis (H)     idiopathic, Tx: PPI, antioxidants, pancreatic enzymes     Common migraine      Coronary artery disease      Costochondritis      Difficulty in walking(719.7)      Dyspnea on exertion      Ectasia, mammary duct     [facility], [facility] n[facility] discharge     Elevated fasting glucose      Gastro-oesophageal reflux disease      Granulomatosis with polyangiitis (H)      Hernia      History of angina      Hyperlipidemia LDL goal < 100      Hypertension goal BP (blood pressure) < 140/90     Essential hypertension     Iron deficiency anemia      Ischemic cardiomyopathy      Menorrhagia      Migraine headaches      Mild persistent asthma      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 disc/optic nerve,   Florencio, neg w/u, Rx'd w/prednisone     Ventral hernia, unspecified, without mention of obstruction or gangrene 2012     Past Surgical History:   Procedure Laterality Date     C/SECTION, LOW TRANSVERSE  1996         CARDIAC SURGERY      cardiac stent- recent in 16; 4 other stents     COLONOSCOPY N/A 2023    Procedure: COLONOSCOPY, WITH POLYPECTOMY;  Surgeon: Percy Gross MD;  Location: [facility] OR     DILATION AND CURETTAGE N/A 2016    Procedure: DILATION AND CURETTAGE;  Surgeon: Nahed Butler MD;  [facility]     ENDOMETRIAL SAMPLING (BIOPSY) N/A 2023    Procedure: ENDOMETRIAL BIOPSY;  Surgeon: Nahed Butler MD;  [facility]     ESOPHAGOSCOPY, GASTROSCOPY, DUODENOSCOPY (EGD), COMBINED N/A 2022    Procedure: ESOPHAGOGASTRODUODENOSCOPY, WITH BIOPSY;  Surgeon: Enzo Sesay MD;  Location: [facility]     ESOPHAGOSCOPY, GASTROSCOPY, DUODENOSCOPY (EGD), COMBINED N/A 2023    Procedure: ESOPHAGOGASTRODUODENOSCOPY, WITH BIOPSY;  Surgeon: Percy Gross MD;  Location: [facility] OR     EXAM UNDER ANESTHESIA PELVIC N/A 2023    Procedure: EXAM UNDER ANESTHESIA;  Surgeon: Nahed Butler MD;  [facility]     HC UGI ENDOSCOPY W EUS  2008    Dr. Pastrana, possible chronic pancreatitis, fatty liver     HERNIA REPAIR  2012    done at [facility] [place]     INSERT INTRAUTERINE DEVICE N/A 2016    Procedure: INSERT INTRAUTERINE DEVICE;  Surgeon: Nahed Butler MD;  [facility]     INT UTERINE FIBRIOD EMBOLIZATION  10/29/2014     LAPAROSCOPIC CHOLECYSTECTOMY  2008    Dr. Arnol GRUBBS TX, CERVICAL  1992    s/p LEEP, done at [facility] in [place].     ORBITOTOMY Right 03/15/2016    Procedure: ORBITOTOMY;  Surgeon: Myron Cyr MD;  Location: [facility]     ORBITOTOMY Right 2017    Procedure: ORBITOTOMY;  RIGHT ORBITOTOMY AND BIOPSY;  Surgeon: Charis Holbrook MD;  Location: [facility]     REMOVE  INTRAUTERINE DEVICE N/A 4/28/2023    Procedure: Remove intrauterine device,;  Surgeon: Nahed Butler MD;  [facility]     REPAIR PTOSIS Right 11/17/2017    Procedure: REPAIR PTOSIS;  RIGHT UPPER LID PTOSIS REPAIR;  Surgeon: Myron Cyr MD;  Location: [facility]     UPPER GI ENDOSCOPY  10/21/2008    mild gastritis, Dr. Rocky [facility] ECHO HEART XTHORACIC,COMPLETE, W/O DOPPLER  02/04/2004    [facility] WNL, EF 60%     Current Outpatient Medications   Medication     clotrimazole (MYCELEX) 10 MG lozenge     methylPREDNISolone (MEDROL DOSEPAK) 4 MG tablet therapy pack     acetaminophen (TYLENOL) 325 MG tablet     ADVAIR DISKUS 250-50 MCG/ACT inhaler     albuterol (2.5 MG/3ML) 0.083% neb solution     albuterol (PROAIR HFA, PROVENTIL HFA, VENTOLIN HFA) 108 (90 BASE) MCG/ACT inhaler     BANOPHEN 25 MG tablet     blood glucose (NO BRAND SPECIFIED) lancets standard     blood glucose (NO BRAND SPECIFIED) test strip     Blood Pressure Monitor KIT     calcium carbonate (TUMS) 500 MG chewable tablet     clopidogrel (PLAVIX) 75 MG tablet     cyclobenzaprine (FLEXERIL) 10 MG tablet     dicyclomine (BENTYL) 20 MG tablet     empagliflozin (JARDIANCE) 10 MG TABS tablet     EPIPEN 2-RIKY 0.3 MG/0.3ML injection     esomeprazole (NEXIUM) 40 MG DR capsule     estradiol (VAGIFEM) 10 MCG TABS vaginal tablet     fluticasone (FLONASE) 50 MCG/ACT nasal spray     folic acid (FOLVITE) 1 MG tablet     hydrocortisone (CORTAID) 1 % external cream     insulin glargine (LANTUS PEN) 100 UNIT/ML pen     insulin pen needle (BD PEN NEEDLE MARCK 2ND GEN) 32G X 4 MM miscellaneous     ketoconazole (NIZORAL) 2 % shampoo     levocetirizine (XYZAL) 5 MG tablet     lisinopril-hydrochlorothiazide (ZESTORETIC) 20-25 MG tablet     magnesium 250 MG tablet     metFORMIN (GLUCOPHAGE XR) 500 MG 24 hr tablet     metoprolol succinate ER (TOPROL XL) 100 MG 24 hr tablet     Multiple Vitamin (TAB-A-GERADL) TABS     nitroGLYcerin (NITROSTAT) 0.4 MG  "sublingual tablet     olopatadine (PATANOL) 0.1 % ophthalmic solution     pravastatin (PRAVACHOL) 40 MG tablet     prochlorperazine (COMPAZINE) 5 MG tablet     sennosides (SENOKOT) 8.6 MG tablet     spironolactone (ALDACTONE) 25 MG tablet     sucralfate (CARAFATE) 1 GM tablet     terbinafine (LAMISIL) 1 % external cream     traMADol (ULTRAM) 50 MG tablet     vitamin D2 (ERGOCALCIFEROL) 50918 units (1250 mcg) capsule     No current facility-administered medications for this visit.     Allergies   Allergen Reactions     Amoxicillin-Pot Clavulanate      Unknown possible hives and edema     Amoxicillin-Pot Clavulanate      Artificial Sweetner (Informational Only)  Other (See Comments)     Increased headache     Azithromycin      Clavulanic Acid      Diatrizoate Other (See Comments)     Pt wants this listed because she is allergic to shellfish      Imitrex [Triptans]      Severe face/neck/chest tightness and flushing side effects      Penicillins Hives     Unknown      Pork Allergy      Stomach pain, cramping, diarrhea, itching, nausea and headaches     Shellfish Allergy Hives and Swelling     Shellfish-Derived Products      Sulfa Antibiotics Hives and Swelling     Sulfatolamide      Zithromax [Azithromycin Dihydrate] Swelling     Unknown              Review of Systems         Objective    /73 (BP Location: Right arm, Patient Position: Sitting, Cuff Size: Adult Regular)   Pulse 79   Ht 1.575 m (5' 2\")   Wt 77 kg (169 lb 12.8 oz)   SpO2 98%   BMI 31.06 kg/m    Body mass index is 31.06 kg/m .  Physical Exam   GENERAL: healthy, alert and no distress  White coaqting on tongue  ABDOMEN: soft, nontender, no hepatosplenomegaly, no masses and bowel sounds normal  MS: no gross musculoskeletal defects noted, no edema. Hands/feet mild swelling distal joints, not red/warm              "

## 2023-07-10 ENCOUNTER — TELEPHONE (OUTPATIENT)
Dept: ENDOCRINOLOGY | Facility: CLINIC | Age: 58
End: 2023-07-10
Payer: COMMERCIAL

## 2023-07-10 NOTE — TELEPHONE ENCOUNTER
Prior Authorization Retail Medication Request    Medication/Dose: Jardiance 10MG tablets. Take 2 by mouth daily.       ICD code (if different than what is on RX): E11.9    Previously Tried and Failed:      Rationale:  Type 2 diabetes, HbA1c goal     Insurance Name:    Insurance ID:        Pharmacy Information (if different than what is on RX)  Name:    Phone:

## 2023-07-11 ENCOUNTER — TELEPHONE (OUTPATIENT)
Dept: RHEUMATOLOGY | Facility: CLINIC | Age: 58
End: 2023-07-11
Payer: COMMERCIAL

## 2023-07-11 NOTE — TELEPHONE ENCOUNTER
Message left for patient to confirm if she could see Dr. Mckinnon this Friday 7/14 at 1430.    Krissy Taylor, ISHAN, RN  RN Care Coordinator Rheumatology

## 2023-07-12 LAB
MYELOPEROXIDASE AB SER IA-ACNC: 0.6 U/ML
MYELOPEROXIDASE AB SER IA-ACNC: NEGATIVE
PROTEINASE3 AB SER IA-ACNC: <1 U/ML
PROTEINASE3 AB SER IA-ACNC: NEGATIVE

## 2023-07-12 NOTE — TELEPHONE ENCOUNTER
Prior Authorization Not Needed per Insurance    Medication: EMPAGLIFLOZIN 10 MG PO TABS  Insurance Company: [employer] - Phone [phone] Fax [phone]  Expected CoPay:      Pharmacy Filling the Rx: [facility] [place], [place] - [address] [place] [address] [phone]  Pharmacy Notified: Yes  Patient Notified: Yes **Instructed pharmacy to notify patient when script is ready to /ship.**

## 2023-08-01 ENCOUNTER — OFFICE VISIT (OUTPATIENT)
Dept: ENDOCRINOLOGY | Facility: CLINIC | Age: 58
End: 2023-08-01
Payer: COMMERCIAL

## 2023-08-01 VITALS
HEART RATE: 69 BPM | BODY MASS INDEX: 30.64 KG/M2 | DIASTOLIC BLOOD PRESSURE: 82 MMHG | OXYGEN SATURATION: 98 % | WEIGHT: 167.5 LBS | SYSTOLIC BLOOD PRESSURE: 120 MMHG

## 2023-08-01 DIAGNOSIS — Z79.4 TYPE 2 DIABETES MELLITUS WITH OTHER CIRCULATORY COMPLICATION, WITH LONG-TERM CURRENT USE OF INSULIN (H): Primary | ICD-10-CM

## 2023-08-01 DIAGNOSIS — E11.59 TYPE 2 DIABETES MELLITUS WITH OTHER CIRCULATORY COMPLICATION, WITH LONG-TERM CURRENT USE OF INSULIN (H): Primary | ICD-10-CM

## 2023-08-01 PROCEDURE — 99215 OFFICE O/P EST HI 40 MIN: CPT | Performed by: PHYSICIAN ASSISTANT

## 2023-08-01 ASSESSMENT — PAIN SCALES - GENERAL: PAINLEVEL: SEVERE PAIN (7)

## 2023-08-01 NOTE — LETTER
8/1/2023       RE: Janine Cornell  [address]  [facility], [place] [address]     Dear Colleague,    Thank you for referring your patient, Janine Cornell, to the [facility] [place] at [facility]. Please see a copy of my visit note below.    Patient is showing 4/5 MNCM met. A1c not in range   Noemí Mendoza,      HPI  Janine Cornell is a 58 year old female with type 2 diabetes mellitus. Clinic visit for diabetes follow up today.   Pt last seen by Dr. Norman in April 2023.   Pt was dx having type 2 diabetes mellitus in 2010.  No hx of diabetic retinopathy, seen regularly for hx of gpa. No nephropathy. Sx of numbness, stiffness and pain in toes.  Pt has hx of  granulomatosis polyangiitis, fibromyalgia, NSTEM - CAD s/p stents, obesity, PCOS, chronic pancreatitis, HTN, hyperlipidemia, fatty liver, asthma, ischemic cardiomyopathy, COVID infection x 2 and anemia.  She has multiple allergies.  For her diabetes, she is taking Metformin 2000 mg daily, Jardiance 10 mg -taking at night and Lantus 65 units subcutaneous each am.  Most recent A1C 9.5 % on 6/21/2023.  A1C was 10.3 % in Aug 2022.  She is not checking her blood sugars.  On ROS today, fatigued. pa\  Pain, numbness and stiffness in toes. No foot ulcers.  Chronic nausea.  Mild abd discomfort.  Less diarrhea.   Denies dysuria.    Diabetes Care  Retinopathy: no diabetic retinopathy. Hx of gpa and seen by Oph on regular basis.  Nephropathy: none.Pt taking lisinopril/hydrochlorothiazide.  Neuropathy: not sure.  Foot Exam: no ulcers.  Taking aspirin: no.  Lipids: LDL 97 in 6/2023. Pt taking Pravachol.  Insulin: yes.  DM meds: Metformin and Jardiance.   Testing: none; pt not interested in using a Freestyle Abigail sensor.    ROS  See under HPI.    Allergies  Allergies   Allergen Reactions    Amoxicillin-Pot Clavulanate      Unknown possible hives and edema    Amoxicillin-Pot Clavulanate     Artificial  Kyle (Informational Only)  Other (See Comments)     Increased headache    Azithromycin     Clavulanic Acid     Diatrizoate Other (See Comments)     Pt wants this listed because she is allergic to shellfish     Imitrex [Triptans]      Severe face/neck/chest tightness and flushing side effects     Penicillins Hives     Unknown     Pork Allergy      Stomach pain, cramping, diarrhea, itching, nausea and headaches    Shellfish Allergy Hives and Swelling    Shellfish-Derived Products     Sulfa Antibiotics Hives and Swelling    Sulfatolamide     Zithromax [Azithromycin Dihydrate] Swelling     Unknown        Medications  Current Outpatient Medications   Medication Sig Dispense Refill    acetaminophen (TYLENOL) 325 MG tablet Take 1-2 tablets (325-650 mg) by mouth every 6 hours as needed for pain (headache) 250 tablet 4    ADVAIR DISKUS 250-50 MCG/ACT inhaler Inhale 1 puff into the lungs 2 times daily      albuterol (2.5 MG/3ML) 0.083% neb solution INL 1 VIAL VIA NEBULIZATION Q 4 TO 6 HOURS PRN  1    albuterol (PROAIR HFA, PROVENTIL HFA, VENTOLIN HFA) 108 (90 BASE) MCG/ACT inhaler Inhale 2 puffs into the lungs every 6 hours as needed for shortness of breath / dyspnea or wheezing 3 Inhaler 1    BANOPHEN 25 MG tablet Take 25 mg by mouth At Bedtime      blood glucose (NO BRAND SPECIFIED) lancets standard Use to test blood sugar 1-4 times daily or as directed. 400 each 3    blood glucose (NO BRAND SPECIFIED) test strip Use to test blood sugar 1-4 times daily or as directed. 400 strip 3    Blood Pressure Monitor KIT 1 each daily Monitor home blood pressure as instructed by physician.  Dispense [facility] blood pressure kit. 1 kit 0    calcium carbonate (TUMS) 500 MG chewable tablet Take 3-4 tablets (1,500-2,000 mg) by mouth daily as needed 90 tablet 3    clopidogrel (PLAVIX) 75 MG tablet Take 1 tablet (75 mg) by mouth daily . 90 tablet 3    cyclobenzaprine (FLEXERIL) 10 MG tablet Take 1 tablet (10 mg) by mouth 2 times daily as  needed for muscle spasms 60 tablet 3    dicyclomine (BENTYL) 20 MG tablet TAKE 1 TABLET(20 MG) BY MOUTH FOUR TIMES DAILY AS NEEDED. 60 tablet 0    empagliflozin (JARDIANCE) 10 MG TABS tablet Take 2 tablets (20 mg) by mouth daily (Patient taking differently: Take 20 mg by mouth At Bedtime) 90 tablet 3    EPIPEN 2-RIKY 0.3 MG/0.3ML injection INJECT 0.3 MG INTO THE MUSCLE PRF ANAPHYALAXIS  0    esomeprazole (NEXIUM) 40 MG DR capsule Take 1 capsule (40 mg) by mouth 2 times daily Take 30-60 minutes before eating. (Patient taking differently: Take 40 mg by mouth as needed Take 30-60 minutes before eating.) 180 capsule 0    fluticasone (FLONASE) 50 MCG/ACT nasal spray Spray 1 spray in nostril as needed      folic acid (FOLVITE) 1 MG tablet Take 1 tablet (1 mg) by mouth daily (Patient taking differently: Take 1 mg by mouth At Bedtime) 90 tablet 3    hydrocortisone (CORTAID) 1 % external cream Apply topically 2 times daily (Patient taking differently: Apply topically as needed) 60 g 1    insulin glargine (LANTUS PEN) 100 UNIT/ML pen Inject 65 Units Subcutaneous every morning Or as directed. 75 mL 3    insulin pen needle (BD PEN NEEDLE MARCK 2ND GEN) 32G X 4 MM miscellaneous Use one pen needle daily or as directed. 100 each 3    levocetirizine (XYZAL) 5 MG tablet Take 5 mg by mouth as needed      lisinopril-hydrochlorothiazide (ZESTORETIC) 20-25 MG tablet Take 1 tablet by mouth daily 90 tablet 3    magnesium 250 MG tablet TAKE 1 TABLET(250 MG) BY MOUTH TWICE DAILY (Patient taking differently: Take 1 tablet by mouth At Bedtime) 60 tablet 3    metFORMIN (GLUCOPHAGE XR) 500 MG 24 hr tablet Take 4 tablets (2,000 mg) by mouth daily (with dinner) (Patient taking differently: Take 2,000 mg by mouth At Bedtime) 360 tablet 3    metoprolol succinate ER (TOPROL XL) 100 MG 24 hr tablet Take 1 tablet (100 mg) by mouth daily 90 tablet 3    Multiple Vitamin (TAB-A-GERALD) TABS Take 1 tablet by mouth daily (Patient taking differently: Take 1  tablet by mouth At Bedtime) 90 tablet 1    olopatadine (PATANOL) 0.1 % ophthalmic solution Place 1 drop into both eyes as needed      pravastatin (PRAVACHOL) 40 MG tablet Take 1 tablet (40 mg) by mouth daily 90 tablet 3    prochlorperazine (COMPAZINE) 5 MG tablet Take 1 tablet (5 mg) by mouth every 6 hours as needed for nausea or vomiting 60 tablet 3    sennosides (SENOKOT) 8.6 MG tablet 1-2 tabs a day as needed for constipation 60 tablet 1    spironolactone (ALDACTONE) 25 MG tablet Take 1 tablet (25 mg) by mouth daily . Take one additional 0.5 tab daily as needed for weight gain. 45 day refills. (Patient taking differently: Take 25 mg by mouth At Bedtime . Take one additional 0.5 tab daily as needed for weight gain. 45 day refills.) 45 tablet 11    sucralfate (CARAFATE) 1 GM tablet Take 1 tablet (1 g) by mouth 4 times daily 120 tablet 3    terbinafine (LAMISIL) 1 % external cream Apply topically 2 times daily (Patient taking differently: Apply topically as needed) 42 g 1    traMADol (ULTRAM) 50 MG tablet TAKE 1 TABLET(50 MG) BY MOUTH EVERY 8 HOURS AS NEEDED FOR SEVERE PAIN 60 tablet 3    clotrimazole (MYCELEX) 10 MG lozenge Place 1 lozenge (10 mg) inside cheek 5 times daily (Patient not taking: Reported on 8/1/2023) 50 lozenge 1    estradiol (VAGIFEM) 10 MCG TABS vaginal tablet Place 1 tablet (10 mcg) vaginally twice a week (Patient not taking: Reported on 8/1/2023) 24 tablet 3    ketoconazole (NIZORAL) 2 % shampoo Apply topically daily as needed (Patient not taking: Reported on 8/1/2023)      methylPREDNISolone (MEDROL DOSEPAK) 4 MG tablet therapy pack Follow Package Directions (Patient not taking: Reported on 8/1/2023) 21 tablet 0    nitroGLYcerin (NITROSTAT) 0.4 MG sublingual tablet Place 1 tablet (0.4 mg) under the tongue every 5 minutes as needed for chest pain . After 3 doses, if pain persists call 911. (Patient not taking: Reported on 8/1/2023) 25 tablet 3    vitamin D2 (ERGOCALCIFEROL) 82327 units (1250  mcg) capsule Take 1 capsule (50,000 Units) by mouth every 7 days (Patient not taking: Reported on 8/1/2023) 4 capsule 0       Family History  family history includes Anesthesia Reaction in her son; Arthritis in her brother, maternal uncle, and mother; C.A.D. in her brother, father, and mother; Cerebrovascular Disease in her father and maternal uncle; Depression in her father; Diabetes in her father and maternal uncle; Eye Disorder in her maternal uncle; Family History Negative in an other family member; Heart Disease in her mother; Heart Disease (age of onset: 15) in her brother; Heart Disease (age of onset: 50) in her father; Hypertension in her father, maternal aunt, maternal uncle, and mother; Neurologic Disorder in her sister and sister; Respiratory in her son; Thyroid Disease in her mother.    Social History   reports that she quit smoking about 7 years ago. Her smoking use included cigarettes. She has a 0.20 pack-year smoking history. She has never used smokeless tobacco. She reports that she does not drink alcohol and does not use drugs.     Past Medical History  Past Medical History:   Diagnosis Date    Abnormal glandular Papanicolaou smear of cervix 1992    Allergic rhinitis     Allergy, airborne subst    Arthritis     ASCVD (arteriosclerotic cardiovascular disease)     Chronic pain     Chronic pancreatitis (H)     idiopathic, Tx: PPI, antioxidants, pancreatic enzymes    Common migraine     Coronary artery disease     Costochondritis     Difficulty in walking(719.7)     Dyspnea on exertion     Ectasia, mammary duct     [facility] n[facility] discharge    Elevated fasting glucose     Gastro-oesophageal reflux disease     Granulomatosis with polyangiitis (H)     Hernia     History of angina     Hyperlipidemia LDL goal < 100     Hypertension goal BP (blood pressure) < 140/90     Essential hypertension    Iron deficiency anemia     Ischemic cardiomyopathy     Menorrhagia     Migraine  headaches     Mild persistent asthma     Neuritis optic     subacute autoimmune retrobulbar neuritis, 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 Matias, neg w/u, Rx'd w/prednisone    Ventral hernia, unspecified, without mention of obstruction or gangrene 2012       Past Surgical History:   Procedure Laterality Date    C/SECTION, LOW TRANSVERSE  1996        CARDIAC SURGERY      cardiac stent- recent in 16; 4 other stents    COLONOSCOPY N/A 2023    Procedure: COLONOSCOPY, WITH POLYPECTOMY;  Surgeon: Percy Gross MD;  Location: [facility] OR    DILATION AND CURETTAGE N/A 2016    Procedure: DILATION AND CURETTAGE;  Surgeon: Nahed Butler MD;  [facility]    ENDOMETRIAL SAMPLING (BIOPSY) N/A 2023    Procedure: ENDOMETRIAL BIOPSY;  Surgeon: Nahed Butler MD;  [facility]    ESOPHAGOSCOPY, GASTROSCOPY, DUODENOSCOPY (EGD), COMBINED N/A 2022    Procedure: ESOPHAGOGASTRODUODENOSCOPY, WITH BIOPSY;  Surgeon: Enzo Sesay MD;  Location: [facility]    ESOPHAGOSCOPY, GASTROSCOPY, DUODENOSCOPY (EGD), COMBINED N/A 2023    Procedure: ESOPHAGOGASTRODUODENOSCOPY, WITH BIOPSY;  Surgeon: Percy Gross MD;  Location: [facility] OR    EXAM UNDER ANESTHESIA PELVIC N/A 2023    Procedure: EXAM UNDER ANESTHESIA;  Surgeon: Nahed Butler MD;  [facility]    [facility] UGI  ENDOSCOPY W EUS  11/07/2008    Dr. Pastrana, possible chronic pancreatitis, fatty liver    HERNIA REPAIR  12/01/2012    done at [facility] [place]    INSERT INTRAUTERINE DEVICE N/A 07/06/2016    Procedure: INSERT INTRAUTERINE DEVICE;  Surgeon: Nahed Butler MD;  [facility]    INT UTERINE FIBRIOD EMBOLIZATION  10/29/2014    LAPAROSCOPIC CHOLECYSTECTOMY  05/28/2008    Dr. Arnol GRUBBS TX, CERVICAL  1992    s/p LEEP, done at [facility] in [place].    ORBITOTOMY Right 03/15/2016    Procedure: ORBITOTOMY;  Surgeon: Myron Cyr MD;  Location: [facility]    ORBITOTOMY Right 08/04/2017    Procedure: ORBITOTOMY;  RIGHT ORBITOTOMY AND BIOPSY;  Surgeon: Charis Holbrook MD;  Location: [facility]    REMOVE INTRAUTERINE DEVICE N/A 4/28/2023    Procedure: Remove intrauterine device,;  Surgeon: Nahed Butler MD;  [facility]    REPAIR PTOSIS Right 11/17/2017    Procedure: REPAIR PTOSIS;  RIGHT UPPER LID PTOSIS REPAIR;  Surgeon: Myron Cyr MD;  Location: [facility]    UPPER GI ENDOSCOPY  10/21/2008    mild gastritis, Dr. Rocky [facility] ECHO HEART XTHORACIC,COMPLETE, W/O DOPPLER  02/04/2004    [facility] WNL, EF 60%       Physical Exam  /82   Pulse 69   Wt 76 kg (167 lb 8 oz)   SpO2 98%   BMI 30.64 kg/m    Body mass index is 30.64 kg/m .    FEET: No ulcers.    RESULTS  Creatinine   Date Value Ref Range Status   06/29/2023 1.16 (H) 0.51 - 0.95 mg/dL Final   05/28/2021 1.00 0.52 - 1.04 mg/dL Final     GFR Estimate   Date Value Ref Range Status   06/29/2023 55 (L) >60 mL/min/1.73m2 Final   05/28/2021 64 >60 mL/min/[1.73_m2] Final     Comment:     Non  GFR Calc  Starting 12/18/2018, serum creatinine based estimated GFR (eGFR) will be   calculated using the Chronic Kidney Disease Epidemiology Collaboration   (CKD-EPI) equation.       Hemoglobin A1C   Date Value Ref Range Status   06/21/2023 9.5 (H) <5.7 % Final     Comment:     Normal <5.7%   Prediabetes 5.7-6.4%    Diabetes 6.5%  or higher     Note: Adopted from ADA consensus guidelines.   06/18/2020 8.0 (H) 0 - 5.6 % Final     Comment:     Normal <5.7% Prediabetes 5.7-6.4%  Diabetes 6.5% or higher - adopted from ADA   consensus guidelines.       Potassium   Date Value Ref Range Status   06/29/2023 3.5 3.4 - 5.3 mmol/L Final   07/08/2022 3.9 3.4 - 5.3 mmol/L Final   05/28/2021 3.5 3.4 - 5.3 mmol/L Final     ALT   Date Value Ref Range Status   06/29/2023 32 0 - 50 U/L Final     Comment:     Reference intervals for this test were updated on 6/12/2023 to more accurately reflect our healthy population. There may be differences in the flagging of prior results with similar values performed with this method. Interpretation of those prior results can be made in the context of the updated reference intervals.     05/28/2021 28 0 - 50 U/L Final     AST   Date Value Ref Range Status   06/29/2023 28 0 - 45 U/L Final     Comment:     Reference intervals for this test were updated on 6/12/2023 to more accurately reflect our healthy population. There may be differences in the flagging of prior results with similar values performed with this method. Interpretation of those prior results can be made in the context of the updated reference intervals.   05/28/2021 20 0 - 45 U/L Final     TSH   Date Value Ref Range Status   01/19/2023 0.40 0.30 - 4.20 uIU/mL Final   05/14/2022 1.48 0.40 - 4.00 mU/L Final   03/06/2019 0.25 (L) 0.40 - 4.00 mU/L Final     T4 Free   Date Value Ref Range Status   03/06/2019 1.00 0.76 - 1.46 ng/dL Final     Free T4   Date Value Ref Range Status   02/04/2022 1.27 0.76 - 1.46 ng/dL Final       Cholesterol   Date Value Ref Range Status   06/29/2023 161 <200 mg/dL Final   02/08/2022 109 <200 mg/dL Final   06/18/2020 102 <200 mg/dL Final   07/11/2019 132 <200 mg/dL Final     HDL Cholesterol   Date Value Ref Range Status   06/18/2020 30 (L) >49 mg/dL Final   07/11/2019 40 (L) >49 mg/dL Final     Direct Measure HDL   Date Value Ref Range  Status   06/29/2023 42 (L) >=50 mg/dL Final   02/08/2022 38 (L) >=50 mg/dL Final     LDL Cholesterol Calculated   Date Value Ref Range Status   06/29/2023 89 <=100 mg/dL Final   02/08/2022 43 <=100 mg/dL Final   06/18/2020 50 <100 mg/dL Final     Comment:     Desirable:       <100 mg/dl   07/11/2019 62 <100 mg/dL Final     Comment:     Desirable:       <100 mg/dl     LDL Cholesterol Direct   Date Value Ref Range Status   06/29/2023 97 <100 mg/dL Final     Comment:     Age 2-19 years:  Desirable: 0-110 mg/dL   Borderline high: 110-129 mg/dL   High: >= 130 mg/dL    Age 20 years and older:  Desirable: <100mg/dL  Above desirable: 100-129 mg/dL   Borderline high: 130-159 mg/dL   High: 160-189 mg/dL   Very high: >= 190 mg/dL     Triglycerides   Date Value Ref Range Status   06/29/2023 148 <150 mg/dL Final   02/08/2022 141 <150 mg/dL Final   06/18/2020 104 <150 mg/dL Final   07/11/2019 149 <150 mg/dL Final     Cholesterol/HDL Ratio   Date Value Ref Range Status   07/29/2015 3.5 0.0 - 5.0 Final   02/04/2015 3.1 0.0 - 5.0 Final         ASSESSMENT/PLAN:      TYPE 2 DIABETES MELLITUS: Uncontrolled type 2 diabetes. Janine is not checking her blood sugars. I have asked her to check her FBS and predinner blood sugar daily. She is not interested in using a Freestyle Abigail sensor. Will see pt in follow up once she has blood sugar data for me to review. She may need meal time insulin. She does not want to increase her Jardiance dose. I asked he to take the Jardiance in the AM.  Avoid use of GLP-1 drugs given her hx of chronic pancreatitis. No hx of diabetic retinopathy per patient. No hx of nephropathy.  Most recent creat 1.16 with GFR 55 mL/min on 6/29/2023. She reports pain, numbness and stiffness in her toes. This may not be neuropathy.    HX OF CHRONIC PANCREATITIS: Avoid GLP-1 drugs as above.  OBESITY: She declines to meet with RD or CDE.   FOOT PAIN: Referred to Podiatry.  FOLLOW UP: With Dr. Norman in Oct [id number].   A1C  ordered and to be done in 9/2023. If A1C remains high, will discuss use of meal time insulin.    Time spent reviewing chart and labs today = 8 minutes.  Time for clinic visit today = 25 minutes.  Time for documentation today = 15 minutes.    Total time for visit today= 48 minutes    Krissy Danielle PA-C

## 2023-08-04 NOTE — PROGRESS NOTES
HPI  Janine Cornell is a 58 year old female with type 2 diabetes mellitus. Clinic visit for diabetes follow up today.   Pt last seen by Dr. Norman in April 2023.   Pt was dx having type 2 diabetes mellitus in 2010.  No hx of diabetic retinopathy, seen regularly for hx of gpa. No nephropathy. Sx of numbness, stiffness and pain in toes.  Pt has hx of  granulomatosis polyangiitis, fibromyalgia, NSTEM - CAD s/p stents, obesity, PCOS, chronic pancreatitis, HTN, hyperlipidemia, fatty liver, asthma, ischemic cardiomyopathy, COVID infection x 2 and anemia.  She has multiple allergies.  For her diabetes, she is taking Metformin 2000 mg daily, Jardiance 10 mg -taking at night and Lantus 65 units subcutaneous each am.  Most recent A1C 9.5 % on 6/21/2023.  A1C was 10.3 % in Aug 2022.  She is not checking her blood sugars.  On ROS today, fatigued. pa\  Pain, numbness and stiffness in toes. No foot ulcers.  Chronic nausea.  Mild abd discomfort.  Less diarrhea.   Denies dysuria.    Diabetes Care  Retinopathy: no diabetic retinopathy. Hx of gpa and seen by Oph on regular basis.  Nephropathy: none.Pt taking lisinopril/hydrochlorothiazide.  Neuropathy: not sure.  Foot Exam: no ulcers.  Taking aspirin: no.  Lipids: LDL 97 in 6/2023. Pt taking Pravachol.  Insulin: yes.  DM meds: Metformin and Jardiance.   Testing: none; pt not interested in using a Freestyle Abigail sensor.    ROS  See under HPI.    Allergies  Allergies   Allergen Reactions    Amoxicillin-Pot Clavulanate      Unknown possible hives and edema    Amoxicillin-Pot Clavulanate     Artificial Sweetner (Informational Only)  Other (See Comments)     Increased headache    Azithromycin     Clavulanic Acid     Diatrizoate Other (See Comments)     Pt wants this listed because she is allergic to shellfish     Imitrex [Triptans]      Severe face/neck/chest tightness and flushing side effects     Penicillins Hives     Unknown     Pork Allergy      Stomach pain, cramping, diarrhea,  itching, nausea and headaches    Shellfish Allergy Hives and Swelling    Shellfish-Derived Products     Sulfa Antibiotics Hives and Swelling    Sulfatolamide     Zithromax [Azithromycin Dihydrate] Swelling     Unknown        Medications  Current Outpatient Medications   Medication Sig Dispense Refill    acetaminophen (TYLENOL) 325 MG tablet Take 1-2 tablets (325-650 mg) by mouth every 6 hours as needed for pain (headache) 250 tablet 4    ADVAIR DISKUS 250-50 MCG/ACT inhaler Inhale 1 puff into the lungs 2 times daily      albuterol (2.5 MG/3ML) 0.083% neb solution INL 1 VIAL VIA NEBULIZATION Q 4 TO 6 HOURS PRN  1    albuterol (PROAIR HFA, PROVENTIL HFA, VENTOLIN HFA) 108 (90 BASE) MCG/ACT inhaler Inhale 2 puffs into the lungs every 6 hours as needed for shortness of breath / dyspnea or wheezing 3 Inhaler 1    BANOPHEN 25 MG tablet Take 25 mg by mouth At Bedtime      blood glucose (NO BRAND SPECIFIED) lancets standard Use to test blood sugar 1-4 times daily or as directed. 400 each 3    blood glucose (NO BRAND SPECIFIED) test strip Use to test blood sugar 1-4 times daily or as directed. 400 strip 3    Blood Pressure Monitor KIT 1 each daily Monitor home blood pressure as instructed by physician.  Dispense [place] blood pressure kit. 1 kit 0    calcium carbonate (TUMS) 500 MG chewable tablet Take 3-4 tablets (1,500-2,000 mg) by mouth daily as needed 90 tablet 3    clopidogrel (PLAVIX) 75 MG tablet Take 1 tablet (75 mg) by mouth daily . 90 tablet 3    cyclobenzaprine (FLEXERIL) 10 MG tablet Take 1 tablet (10 mg) by mouth 2 times daily as needed for muscle spasms 60 tablet 3    dicyclomine (BENTYL) 20 MG tablet TAKE 1 TABLET(20 MG) BY MOUTH FOUR TIMES DAILY AS NEEDED. 60 tablet 0    empagliflozin (JARDIANCE) 10 MG TABS tablet Take 2 tablets (20 mg) by mouth daily (Patient taking differently: Take 20 mg by mouth At Bedtime) 90 tablet 3    EPIPEN 2-RIKY 0.3 MG/0.3ML injection INJECT 0.3 MG INTO THE MUSCLE PRF ANAPHYALAXIS  0     esomeprazole (NEXIUM) 40 MG DR capsule Take 1 capsule (40 mg) by mouth 2 times daily Take 30-60 minutes before eating. (Patient taking differently: Take 40 mg by mouth as needed Take 30-60 minutes before eating.) 180 capsule 0    fluticasone (FLONASE) 50 MCG/ACT nasal spray Spray 1 spray in nostril as needed      folic acid (FOLVITE) 1 MG tablet Take 1 tablet (1 mg) by mouth daily (Patient taking differently: Take 1 mg by mouth At Bedtime) 90 tablet 3    hydrocortisone (CORTAID) 1 % external cream Apply topically 2 times daily (Patient taking differently: Apply topically as needed) 60 g 1    insulin glargine (LANTUS PEN) 100 UNIT/ML pen Inject 65 Units Subcutaneous every morning Or as directed. 75 mL 3    insulin pen needle (BD PEN NEEDLE MARCK 2ND GEN) 32G X 4 MM miscellaneous Use one pen needle daily or as directed. 100 each 3    levocetirizine (XYZAL) 5 MG tablet Take 5 mg by mouth as needed      lisinopril-hydrochlorothiazide (ZESTORETIC) 20-25 MG tablet Take 1 tablet by mouth daily 90 tablet 3    magnesium 250 MG tablet TAKE 1 TABLET(250 MG) BY MOUTH TWICE DAILY (Patient taking differently: Take 1 tablet by mouth At Bedtime) 60 tablet 3    metFORMIN (GLUCOPHAGE XR) 500 MG 24 hr tablet Take 4 tablets (2,000 mg) by mouth daily (with dinner) (Patient taking differently: Take 2,000 mg by mouth At Bedtime) 360 tablet 3    metoprolol succinate ER (TOPROL XL) 100 MG 24 hr tablet Take 1 tablet (100 mg) by mouth daily 90 tablet 3    Multiple Vitamin (TAB-A-GERALD) TABS Take 1 tablet by mouth daily (Patient taking differently: Take 1 tablet by mouth At Bedtime) 90 tablet 1    olopatadine (PATANOL) 0.1 % ophthalmic solution Place 1 drop into both eyes as needed      pravastatin (PRAVACHOL) 40 MG tablet Take 1 tablet (40 mg) by mouth daily 90 tablet 3    prochlorperazine (COMPAZINE) 5 MG tablet Take 1 tablet (5 mg) by mouth every 6 hours as needed for nausea or vomiting 60 tablet 3    sennosides (SENOKOT) 8.6 MG tablet 1-2  tabs a day as needed for constipation 60 tablet 1    spironolactone (ALDACTONE) 25 MG tablet Take 1 tablet (25 mg) by mouth daily . Take one additional 0.5 tab daily as needed for weight gain. 45 day refills. (Patient taking differently: Take 25 mg by mouth At Bedtime . Take one additional 0.5 tab daily as needed for weight gain. 45 day refills.) 45 tablet 11    sucralfate (CARAFATE) 1 GM tablet Take 1 tablet (1 g) by mouth 4 times daily 120 tablet 3    terbinafine (LAMISIL) 1 % external cream Apply topically 2 times daily (Patient taking differently: Apply topically as needed) 42 g 1    traMADol (ULTRAM) 50 MG tablet TAKE 1 TABLET(50 MG) BY MOUTH EVERY 8 HOURS AS NEEDED FOR SEVERE PAIN 60 tablet 3    clotrimazole (MYCELEX) 10 MG lozenge Place 1 lozenge (10 mg) inside cheek 5 times daily (Patient not taking: Reported on 8/1/2023) 50 lozenge 1    estradiol (VAGIFEM) 10 MCG TABS vaginal tablet Place 1 tablet (10 mcg) vaginally twice a week (Patient not taking: Reported on 8/1/2023) 24 tablet 3    ketoconazole (NIZORAL) 2 % shampoo Apply topically daily as needed (Patient not taking: Reported on 8/1/2023)      methylPREDNISolone (MEDROL DOSEPAK) 4 MG tablet therapy pack Follow Package Directions (Patient not taking: Reported on 8/1/2023) 21 tablet 0    nitroGLYcerin (NITROSTAT) 0.4 MG sublingual tablet Place 1 tablet (0.4 mg) under the tongue every 5 minutes as needed for chest pain . After 3 doses, if pain persists call 911. (Patient not taking: Reported on 8/1/2023) 25 tablet 3    vitamin D2 (ERGOCALCIFEROL) 45822 units (1250 mcg) capsule Take 1 capsule (50,000 Units) by mouth every 7 days (Patient not taking: Reported on 8/1/2023) 4 capsule 0       Family History  family history includes Anesthesia Reaction in her son; Arthritis in her brother, maternal uncle, and mother; C.A.D. in her brother, father, and mother; Cerebrovascular Disease in her father and maternal uncle; Depression in her father; Diabetes in her  father and maternal uncle; Eye Disorder in her maternal uncle; Family History Negative in an other family member; Heart Disease in her mother; Heart Disease (age of onset: 15) in her brother; Heart Disease (age of onset: 50) in her father; Hypertension in her father, maternal aunt, maternal uncle, and mother; Neurologic Disorder in her sister and sister; Respiratory in her son; Thyroid Disease in her mother.    Social History   reports that she quit smoking about 7 years ago. Her smoking use included cigarettes. She has a 0.20 pack-year smoking history. She has never used smokeless tobacco. She reports that she does not drink alcohol and does not use drugs.     Past Medical History  Past Medical History:   Diagnosis Date    Abnormal glandular Papanicolaou smear of cervix 1992    Allergic rhinitis     Allergy, airborne subst    Arthritis     ASCVD (arteriosclerotic cardiovascular disease)     Chronic pain     Chronic pancreatitis (H)     idiopathic, Tx: PPI, antioxidants, pancreatic enzymes    Common migraine     Coronary artery disease     Costochondritis     Difficulty in walking(719.7)     Dyspnea on exertion     Ectasia, mammary duct     [facility] n[facility] discharge    Elevated fasting glucose     Gastro-oesophageal reflux disease     Granulomatosis with polyangiitis (H)     Hernia     History of angina     Hyperlipidemia LDL goal < 100     Hypertension goal BP (blood pressure) < 140/90     Essential hypertension    Iron deficiency anemia     Ischemic cardiomyopathy     Menorrhagia     Migraine headaches     Mild persistent asthma     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 Matias, neg w/u, Rx'd w/prednisone    Ventral hernia, unspecified, without mention of obstruction or gangrene 2012       Past Surgical History:   Procedure Laterality Date    C/SECTION, LOW TRANSVERSE  1996        CARDIAC SURGERY      cardiac stent- recent in 16; 4 other stents    COLONOSCOPY N/A 2023    Procedure: COLONOSCOPY, WITH POLYPECTOMY;  Surgeon: Percy Gross MD;  Location: [facility] OR    DILATION AND CURETTAGE N/A 2016    Procedure: DILATION AND CURETTAGE;  Surgeon: Nahed Butler MD;  [facility]    ENDOMETRIAL SAMPLING (BIOPSY) N/A 2023    Procedure: ENDOMETRIAL BIOPSY;  Surgeon: Nahed Butler MD;  [facility]    ESOPHAGOSCOPY, GASTROSCOPY, DUODENOSCOPY (EGD), COMBINED N/A 2022    Procedure: ESOPHAGOGASTRODUODENOSCOPY, WITH BIOPSY;  Surgeon: Enzo Sesay MD;  [facility]: [facility]    ESOPHAGOSCOPY, GASTROSCOPY, DUODENOSCOPY (EGD), COMBINED N/A 2023    Procedure: ESOPHAGOGASTRODUODENOSCOPY, WITH BIOPSY;  Surgeon: Percy Gross MD;  Location: [facility] OR    EXAM UNDER ANESTHESIA PELVIC N/A 2023    Procedure: EXAM UNDER ANESTHESIA;  Surgeon: Nahed Butler MD;  [facility]    HC UGI ENDOSCOPY W EUS  2008    Dr. Pastrana, possible chronic pancreatitis, fatty liver    HERNIA REPAIR  2012    done at [facility]    INSERT INTRAUTERINE DEVICE N/A 2016    Procedure: INSERT INTRAUTERINE DEVICE;  Surgeon: Nahed Butler MD;  [facility]    INT UTERINE FIBRIOD EMBOLIZATION  10/29/2014    LAPAROSCOPIC CHOLECYSTECTOMY  2008    Dr. Arnol GRUBBS TX, CERVICAL  1992     s/p LEERAHUL, done at [facility] in [place].    ORBITOTOMY Right 03/15/2016    Procedure: ORBITOTOMY;  Surgeon: Myron Cyr MD;  Location: [facility]    ORBITOTOMY Right 08/04/2017    Procedure: ORBITOTOMY;  RIGHT ORBITOTOMY AND BIOPSY;  Surgeon: Charis Holbrook MD;  Location: [facility]    REMOVE INTRAUTERINE DEVICE N/A 4/28/2023    Procedure: Remove intrauterine device,;  Surgeon: Nahed Butler MD;  [facility]    REPAIR PTOSIS Right 11/17/2017    Procedure: REPAIR PTOSIS;  RIGHT UPPER LID PTOSIS REPAIR;  Surgeon: Myron Cyr MD;  Location: [facility]    UPPER GI ENDOSCOPY  10/21/2008    mild gastritis, Dr. Rocky [facility]DERA ECHO HEART XTHORACIC,COMPLETE, W/O DOPPLER  02/04/2004    [facility] WNL, EF 60%       Physical Exam  /82   Pulse 69   Wt 76 kg (167 lb 8 oz)   SpO2 98%   BMI 30.64 kg/m    Body mass index is 30.64 kg/m .    FEET: No ulcers.    RESULTS  Creatinine   Date Value Ref Range Status   06/29/2023 1.16 (H) 0.51 - 0.95 mg/dL Final   05/28/2021 1.00 0.52 - 1.04 mg/dL Final     GFR Estimate   Date Value Ref Range Status   06/29/2023 55 (L) >60 mL/min/1.73m2 Final   05/28/2021 64 >60 mL/min/[1.73_m2] Final     Comment:     Non  GFR Calc  Starting 12/18/2018, serum creatinine based estimated GFR (eGFR) will be   calculated using the Chronic Kidney Disease Epidemiology Collaboration   (CKD-EPI) equation.       Hemoglobin A1C   Date Value Ref Range Status   06/21/2023 9.5 (H) <5.7 % Final     Comment:     Normal <5.7%   Prediabetes 5.7-6.4%    Diabetes 6.5% or higher     Note: Adopted from ADA consensus guidelines.   06/18/2020 8.0 (H) 0 - 5.6 % Final     Comment:     Normal <5.7% Prediabetes 5.7-6.4%  Diabetes 6.5% or higher - adopted from ADA   consensus guidelines.       Potassium   Date Value Ref Range Status   06/29/2023 3.5 3.4 - 5.3 mmol/L Final   07/08/2022 3.9 3.4 - 5.3 mmol/L Final   05/28/2021 3.5 3.4 - 5.3 mmol/L Final     ALT   Date Value  Ref Range Status   06/29/2023 32 0 - 50 U/L Final     Comment:     Reference intervals for this test were updated on 6/12/2023 to more accurately reflect our healthy population. There may be differences in the flagging of prior results with similar values performed with this method. Interpretation of those prior results can be made in the context of the updated reference intervals.     05/28/2021 28 0 - 50 U/L Final     AST   Date Value Ref Range Status   06/29/2023 28 0 - 45 U/L Final     Comment:     Reference intervals for this test were updated on 6/12/2023 to more accurately reflect our healthy population. There may be differences in the flagging of prior results with similar values performed with this method. Interpretation of those prior results can be made in the context of the updated reference intervals.   05/28/2021 20 0 - 45 U/L Final     TSH   Date Value Ref Range Status   01/19/2023 0.40 0.30 - 4.20 uIU/mL Final   05/14/2022 1.48 0.40 - 4.00 mU/L Final   03/06/2019 0.25 (L) 0.40 - 4.00 mU/L Final     T4 Free   Date Value Ref Range Status   03/06/2019 1.00 0.76 - 1.46 ng/dL Final     Free T4   Date Value Ref Range Status   02/04/2022 1.27 0.76 - 1.46 ng/dL Final       Cholesterol   Date Value Ref Range Status   06/29/2023 161 <200 mg/dL Final   02/08/2022 109 <200 mg/dL Final   06/18/2020 102 <200 mg/dL Final   07/11/2019 132 <200 mg/dL Final     HDL Cholesterol   Date Value Ref Range Status   06/18/2020 30 (L) >49 mg/dL Final   07/11/2019 40 (L) >49 mg/dL Final     Direct Measure HDL   Date Value Ref Range Status   06/29/2023 42 (L) >=50 mg/dL Final   02/08/2022 38 (L) >=50 mg/dL Final     LDL Cholesterol Calculated   Date Value Ref Range Status   06/29/2023 89 <=100 mg/dL Final   02/08/2022 43 <=100 mg/dL Final   06/18/2020 50 <100 mg/dL Final     Comment:     Desirable:       <100 mg/dl   07/11/2019 62 <100 mg/dL Final     Comment:     Desirable:       <100 mg/dl     LDL Cholesterol Direct   Date  Value Ref Range Status   06/29/2023 97 <100 mg/dL Final     Comment:     Age 2-19 years:  Desirable: 0-110 mg/dL   Borderline high: 110-129 mg/dL   High: >= 130 mg/dL    Age 20 years and older:  Desirable: <100mg/dL  Above desirable: 100-129 mg/dL   Borderline high: 130-159 mg/dL   High: 160-189 mg/dL   Very high: >= 190 mg/dL     Triglycerides   Date Value Ref Range Status   06/29/2023 148 <150 mg/dL Final   02/08/2022 141 <150 mg/dL Final   06/18/2020 104 <150 mg/dL Final   07/11/2019 149 <150 mg/dL Final     Cholesterol/HDL Ratio   Date Value Ref Range Status   07/29/2015 3.5 0.0 - 5.0 Final   02/04/2015 3.1 0.0 - 5.0 Final         ASSESSMENT/PLAN:      TYPE 2 DIABETES MELLITUS: Uncontrolled type 2 diabetes. Janine is not checking her blood sugars. I have asked her to check her FBS and predinner blood sugar daily. She is not interested in using a Freestyle Abigail sensor. Will see pt in follow up once she has blood sugar data for me to review. She may need meal time insulin. She does not want to increase her Jardiance dose. I asked he to take the Jardiance in the AM.  Avoid use of GLP-1 drugs given her hx of chronic pancreatitis. No hx of diabetic retinopathy per patient. No hx of nephropathy.  Most recent creat 1.16 with GFR 55 mL/min on 6/29/2023. She reports pain, numbness and stiffness in her toes. This may not be neuropathy.    HX OF CHRONIC PANCREATITIS: Avoid GLP-1 drugs as above.  OBESITY: She declines to meet with RD or CDE.   FOOT PAIN: Referred to Podiatry.  FOLLOW UP: With Dr. Norman in Oct [id number].   A1C ordered and to be done in 9/2023. If A1C remains high, will discuss use of meal time insulin.    Time spent reviewing chart and labs today = 8 minutes.  Time for clinic visit today = 25 minutes.  Time for documentation today = 15 minutes.    Total time for visit today= 48 minutes    Krissy Danielle PA-C

## 2023-08-11 ENCOUNTER — VIRTUAL VISIT (OUTPATIENT)
Dept: FAMILY MEDICINE | Facility: CLINIC | Age: 58
End: 2023-08-11
Payer: COMMERCIAL

## 2023-08-11 VITALS — WEIGHT: 167 LBS | HEIGHT: 62 IN | BODY MASS INDEX: 30.73 KG/M2

## 2023-08-11 DIAGNOSIS — B37.0 THRUSH: Primary | ICD-10-CM

## 2023-08-11 PROCEDURE — 99442 PR PHYSICIAN TELEPHONE EVALUATION 11-20 MIN: CPT | Mod: 93 | Performed by: FAMILY MEDICINE

## 2023-08-11 RX ORDER — FLUCONAZOLE 100 MG/1
100 TABLET ORAL DAILY
Qty: 15 TABLET | Refills: 0 | Status: SHIPPED | OUTPATIENT
Start: 2023-08-11 | End: 2023-08-26

## 2023-08-11 ASSESSMENT — PAIN SCALES - GENERAL: PAINLEVEL: MODERATE PAIN (5)

## 2023-08-11 NOTE — PATIENT INSTRUCTIONS
Thank you for visiting the Primary Care Center today at the [facility]! The following is some information about our clinic:     Primary Care Center Frequently-Asked Questions    (1) How do I schedule appointments at the [facility]?     Primary Care--to schedule or make changes to an existing appointment, please call our primary care line at [phone].    Labs--to schedule a lab appointment at the [facility] you can use fflick or call [phone]. If you have a [place] location that is closer to home, you can reach out to that location for scheduling options.     Imaging--if you need to schedule a CT, X-ray, MRI, ultrasound, or other imaging study you can call [phone] to schedule at the [facility] or any other [facility] imaging location.     Referrals--if a referral to another specialty was ordered you can expect a phone call from their scheduling team. If you have not heard from them in a week, please call us or send us a fflick message to check the status or get a scheduling number. Please note that this only applies to internal [facility] referrals. If the referral is external you would need to contact their office for scheduling.     (2) I have a question about my visit, who do I contact?     You can call us at the primary care line at [phone] to ask questions about your visit. You can also send a secure message through fflick, which is reviewed by clinic staff. Please note that fflick messages have a twenty-four to forty-eight business hour turnaround time and should not be used for urgent concerns.    (3) How will I get the results of my tests?    If you are signed up for [facility] all tests will be released to you within twenty-four hours of resulting. Please allow three to five days for your doctor to review your results and place a note interpreting the results. If you do not have [facility] you will receive your  results through mail seven to ten business days following the return of the tests. Please note that if there should be any urgent or concerning results that your doctor or their registered nurse will reach out to you the same day as the tests come back. If you have follow up questions about your results or would like to discuss the results in detail please schedule a follow up with your provider either in person or virtually.     (4) How do I get refills of my prescriptions?     You should always first contact your pharmacy for refills of your medications. If submitting a refill request on [email or web address], please be sure to submit the request only once--repeat requests can cause delays in refill. If you are requesting a NEW medication or a medication related to new symptoms you will need to schedule an appointment with a provider prior to approval. Please note: Routine medication refills have up to one to three business day turnaround whereas controlled substances refills have up to five to seven business day turnaround.    (5) I have new symptoms, what do I do?     If you are having an immediate medical emergency, you should dial 911 for assistance.   For anything urgent that needs to be seen within a few hours to one day you should visit a local urgent care for assistance.  For non-urgent symptoms that need to be seen within a few days to a week you can schedule with an available provider in primary care by going to [email or web address] or calling [phone].   If you are not sure how serious your symptoms are or you would like to receive medical advice you can always call [phone] to speak with a triage nurse.

## 2023-08-11 NOTE — PROGRESS NOTES
"Virtual Visit Details    Type of service:  Telephone Visit   Phone call duration: 15 minutes   Janine is a 58 year old who is being evaluated via a billable telephone visit.      What phone number would you like to be contacted at? In [facility]  How would you like to obtain your AVS? Starhart    Distant Location (provider location):  On-site    Assessment & Plan     Thrush  See HPI  - fluconazole (DIFLUCAN) 100 MG tablet; Take 1 tablet (100 mg) by mouth daily for 15 days        BMI:   Estimated body mass index is 30.54 kg/m  as calculated from the following:    Height as of this encounter: 1.575 m (5' 2\").    Weight as of this encounter: 75.8 kg (167 lb).       No follow-ups on file.    Kash Solano MD  [facility]INIC [facility]   Janine is a 58 year old, presenting for the following health issues:  RECHECK (Follow up)    HPI Here for a few things  One thrush still there, immune suppressed, DM, will try orals  GI sx same, she'd like to wait and try to do MR when flares  No new c/o since last seen  She notes chornic hi wbc, not just recent problem  Past Medical History:   Diagnosis Date    Abnormal glandular Papanicolaou smear of cervix 1992    Allergic rhinitis     Allergy, airborne subst    Arthritis     ASCVD (arteriosclerotic cardiovascular disease)     Chronic pain     Chronic pancreatitis (H)     idiopathic, Tx: PPI, antioxidants, pancreatic enzymes    Common migraine     Coronary artery disease     Costochondritis     Difficulty in walking(719.7)     Dyspnea on exertion     Ectasia, mammary duct     [facility], persistent nipple discharge    Elevated fasting glucose     Gastro-oesophageal reflux disease     Granulomatosis with polyangiitis (H)     Hernia     History of angina     Hyperlipidemia LDL goal < 100     Hypertension goal BP (blood pressure) < 140/90     Essential hypertension    Iron deficiency anemia     Ischemic cardiomyopathy     Menorrhagia     " Migraine headaches     Mild persistent asthma     Neuritis optic     subacute autoimmune retrobulbar neuritis, 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 Matias, neg w/u, Rx'd w/prednisone    Ventral hernia, unspecified, without mention of obstruction or gangrene 2012     Past Surgical History:   Procedure Laterality Date    C/SECTION, LOW TRANSVERSE  1996        CARDIAC SURGERY      cardiac stent- recent in 16; 4 other stents    COLONOSCOPY N/A 2023    Procedure: COLONOSCOPY, WITH POLYPECTOMY;  Surgeon: Percy Gross MD;  Location: [facility]    DILATION AND CURETTAGE N/A 2016    Procedure: DILATION AND CURETTAGE;  Surgeon: Nahed Butler MD;  [facility]    ENDOMETRIAL SAMPLING (BIOPSY) N/A 2023    Procedure: ENDOMETRIAL BIOPSY;  Surgeon: Nahed Butler MD;  [facility]    ESOPHAGOSCOPY, GASTROSCOPY, DUODENOSCOPY (EGD), COMBINED N/A 2022    Procedure: ESOPHAGOGASTRODUODENOSCOPY, WITH BIOPSY;  Surgeon: Enzo Sesay MD;  [facility]    ESOPHAGOSCOPY, GASTROSCOPY, DUODENOSCOPY (EGD), COMBINED N/A 2023    Procedure: ESOPHAGOGASTRODUODENOSCOPY, WITH BIOPSY;  Surgeon: Percy Gross MD;  Location: [facility] OR    EXAM UNDER ANESTHESIA PELVIC N/A 2023    Procedure: EXAM UNDER ANESTHESIA;  Surgeon: Nahed Butler MD;  [facility]    HC  UGI ENDOSCOPY W EUS  11/07/2008    Dr. Pastrana, possible chronic pancreatitis, fatty liver    [place]ERNIA REPA[place]  12/01/2012    [place] [facility] [place]    INSERT INTRAUTERINE DEVICE N/A 07/06/2016    Procedure: INSERT INTRAUTERINE DEVICE;  Surgeon: Nahed Butler MD;  [facility]    INT UTERINE FIBRIOD EMBOLIZATION  10/29/2014    LAPAROSCOPIC CHOLECYSTECTOMY  05/28/2008    Dr. Arnol GRUBBS TX, CERVICAL  1992    s/p LEEP, done at [facility] in [place].    ORBITOTOMY Right 03/15/2016    Procedure: ORBITOTOMY;  Surgeon: Myron Cyr MD;  [facility]    ORBITOTOMY Right 08/04/2017    Procedure: ORBITOTOMY;  RIGHT ORBITOTOMY AND BIOPSY;  Surgeon: Charis Holbrook MD;  Location: [facility]    REMOVE INTRAUTERINE DEVICE N/A 4/28/2023    Procedure: Remove intrauterine device,;  Surgeon: Nahed Butler MD;  [facility]    REPAIR PTOSIS Right 11/17/2017    Procedure: REPAIR PTOSIS;  RIGHT UPPER LID PTOSIS REPAIR;  Surgeon: Myron Cyr MD;  Location:  [facility]    UPPER GI ENDOSCOPY  10/21/2008    mild gastritis, Dr. Rocky CALDERA ECHO HEART XTHORACIC,COMPLETE, W/O DOPPLER  02/04/2004    [facility] WNL, EF 60%     Current Outpatient Medications   Medication    fluconazole (DIFLUCAN) 100 MG tablet    acetaminophen (TYLENOL) 325 MG tablet    ADVAIR DISKUS 250-50 MCG/ACT inhaler    albuterol (2.5 MG/3ML) 0.083% neb solution    albuterol (PROAIR HFA, PROVENTIL HFA, VENTOLIN HFA) 108 (90 BASE) MCG/ACT inhaler    BANOPHEN 25 MG tablet    blood glucose (NO BRAND SPECIFIED) lancets standard    blood glucose (NO BRAND SPECIFIED) test strip    Blood Pressure Monitor KIT    calcium carbonate (TUMS) 500 MG chewable tablet    clopidogrel (PLAVIX) 75 MG tablet    clotrimazole (MYCELEX) 10 MG lozenge    cyclobenzaprine (FLEXERIL) 10 MG tablet    dicyclomine (BENTYL) 20 MG tablet    empagliflozin (JARDIANCE) 10 MG TABS tablet    EPIPEN 2-RIKY 0.3 MG/0.3ML injection    esomeprazole (NEXIUM) 40 MG DR capsule     estradiol (VAGIFEM) 10 MCG TABS vaginal tablet    fluticasone (FLONASE) 50 MCG/ACT nasal spray    folic acid (FOLVITE) 1 MG tablet    hydrocortisone (CORTAID) 1 % external cream    insulin glargine (LANTUS PEN) 100 UNIT/ML pen    insulin pen needle (BD PEN NEEDLE MRACK 2ND GEN) 32G X 4 MM miscellaneous    ketoconazole (NIZORAL) 2 % shampoo    levocetirizine (XYZAL) 5 MG tablet    lisinopril-hydrochlorothiazide (ZESTORETIC) 20-25 MG tablet    magnesium 250 MG tablet    metFORMIN (GLUCOPHAGE XR) 500 MG 24 hr tablet    methylPREDNISolone (MEDROL DOSEPAK) 4 MG tablet therapy pack    metoprolol succinate ER (TOPROL XL) 100 MG 24 hr tablet    Multiple Vitamin (TAB-A-GERALD) TABS    nitroGLYcerin (NITROSTAT) 0.4 MG sublingual tablet    olopatadine (PATANOL) 0.1 % ophthalmic solution    pravastatin (PRAVACHOL) 40 MG tablet    prochlorperazine (COMPAZINE) 5 MG tablet    sennosides (SENOKOT) 8.6 MG tablet    spironolactone (ALDACTONE) 25 MG tablet    sucralfate (CARAFATE) 1 GM tablet    terbinafine (LAMISIL) 1 % external cream    traMADol (ULTRAM) 50 MG tablet    vitamin D2 (ERGOCALCIFEROL) 46998 units (1250 mcg) capsule     No current facility-administered medications for this visit.   .  Allergies   Allergen Reactions    Amoxicillin-Pot Clavulanate      Unknown possible hives and edema    Amoxicillin-Pot Clavulanate     Artificial Sweetner (Informational Only)  Other (See Comments)     Increased headache    Azithromycin     Clavulanic Acid     Diatrizoate Other (See Comments)     Pt wants this listed because she is allergic to shellfish     Imitrex [Triptans]      Severe face/neck/chest tightness and flushing side effects     Penicillins Hives     Unknown     Pork Allergy      Stomach pain, cramping, diarrhea, itching, nausea and headaches    Shellfish Allergy Hives and Swelling    Shellfish-Derived Products     Sulfa Antibiotics Hives and Swelling    Sulfatolamide     Zithromax [Azithromycin Dihydrate] Swelling     Unknown             Review of Systems         Objective    Vitals - Patient Reported  Pain Score: Moderate Pain (5)  Pain Loc: Foot        Physical Exam   healthy, alert, and no distress  PSYCH: Alert and oriented times 3; coherent speech, normal   rate and volume, able to articulate logical thoughts, able   to abstract reason, no tangential thoughts, no hallucinations   or delusions  Her affect is normal  RESP: No cough, no audible wheezing, able to talk in full sentences  Remainder of exam unable to be completed due to telephone visits            Phone call duration: 15 minutes

## 2023-08-11 NOTE — NURSING NOTE
Is the patient currently in the state of [place]? YES    Visit mode:TELEPHONE    If the visit is dropped, the patient can be reconnected by: TELEPHONE VISIT: Phone number: [phone]    Will anyone else be joining the visit? NO      How would you like to obtain your AVS? MyChart    Are changes needed to the allergy or medication list? NO    Reason for visit: RECHECK

## 2023-08-15 ENCOUNTER — TELEPHONE (OUTPATIENT)
Dept: NEUROLOGY | Facility: CLINIC | Age: 58
End: 2023-08-15
Payer: COMMERCIAL

## 2023-08-15 ENCOUNTER — TELEPHONE (OUTPATIENT)
Dept: FAMILY MEDICINE | Facility: CLINIC | Age: 58
End: 2023-08-15
Payer: COMMERCIAL

## 2023-08-15 NOTE — TELEPHONE ENCOUNTER
----- Message from Sandra THOMPSON Javid sent at 8/15/2023 10:02 AM CDT -----  Regarding: RE: Referral appointment request  Hello,    Called patient to schedule, declined to schedule appointment, stated she will call to schedule. Phone call encounter created.    Thank you,  ----- Message -----  From: Ankita Blandon RN  Sent: 8/15/2023   8:54 AM CDT  To: Sandra DONNA Javid  Subject: RE: Referral appointment request                 NGN for neuropathy extremities.    Thank you!  ----- Message -----  From: Ankita Blandon, GEORGIE  Sent: 8/15/2023   8:50 AM CDT  To: Ankita Blandon RN  Subject: FW: Referral appointment request                   ----- Message -----  From: Sandra Hua  Sent: 8/15/2023   8:45 AM CDT  To: General Neurology Nurses -   Subject: FW: Referral appointment request                 Hello,    Could you please help not sure if its a general neurology- looked at referral from 6/21 orders by Dr. Sy. Please advise where and with who to schedule patient.    Thank you,  ----- Message -----  From: Marichuy Knight CMA  Sent: 8/15/2023   8:22 AM CDT  To: [id number]  Subject: FW: Referral appointment request                   ----- Message -----  From: Gabriel Bower  Sent: 8/15/2023   8:09 AM CDT  To: [facility]  Subject: Referral appointment request                     Hello,     Could a  for Neurology please reach out to this patient for scheduling?    Thank you   Primary Care Clinic   ----- Message -----  From: Katy Ferrera RN  Sent: 8/15/2023   7:19 AM CDT  To: Clinic Coordinators[facility]    I scheduled her on 9/6/23 at 2 pm, but she can be seen around 4 pm after the rheumatology appt. Please let her know and help her scheduling with neurology. Thank you.    Katy  ----- Message -----  From: Kash Solano MD  Sent: 8/11/2023   6:09 PM CDT  To: Soon-Ramila Ferrera RN    I saw her today by phone  I have Sept 6 2 pm open spot, can you put her in there  She will not see me  till 4 pm probably that day but that is fine    Also can you help have someone help her get into Neuro? Referred in June Thanks

## 2023-08-15 NOTE — TELEPHONE ENCOUNTER
Contact patient: called talked to patient    Note: patient declined to schedule stated she will call to schedule need to talk to her ordering provider first. Writer offer to provide phone number to schedule pt stated she don not need it.    Sandra Hua on 8/15/2023 at 10:01 AM

## 2023-08-15 NOTE — TELEPHONE ENCOUNTER
----- Message from Katy Ferrera RN sent at 8/15/2023  7:17 AM CDT -----  I scheduled her on 9/6/23 at 2 pm, but she can be seen around 4 pm after the rheumatology appt. Please let her know and help her scheduling with neurology. Thank you.    Katy  ----- Message -----  From: Kash Solano MD  Sent: 8/11/2023   6:09 PM CDT  To: Katy Ferrera RN    I saw her today by phone  I have Sept 6 2 pm open spot, can you put her in there  She will not see me till 4 pm probably that day but that is fine    Also can you help have someone help her get into Neuro? Referred in June Thanks

## 2023-08-16 ENCOUNTER — TELEPHONE (OUTPATIENT)
Dept: FAMILY MEDICINE | Facility: CLINIC | Age: 58
End: 2023-08-16
Payer: COMMERCIAL

## 2023-08-21 ENCOUNTER — TELEPHONE (OUTPATIENT)
Dept: NEUROLOGY | Facility: CLINIC | Age: 58
End: 2023-08-21
Payer: COMMERCIAL

## 2023-08-24 ENCOUNTER — TELEPHONE (OUTPATIENT)
Dept: NEUROLOGY | Facility: CLINIC | Age: 58
End: 2023-08-24
Payer: COMMERCIAL

## 2023-08-24 NOTE — TELEPHONE ENCOUNTER
I spoke with the pt, they did not want to schedule at this time due to the long wait to see a provider, they stated they would call and schedule if they changed their mind.    8/24/23 BD

## 2023-08-28 ENCOUNTER — TELEPHONE (OUTPATIENT)
Dept: CARDIOLOGY | Facility: CLINIC | Age: 58
End: 2023-08-28
Payer: COMMERCIAL

## 2023-08-28 ENCOUNTER — TELEPHONE (OUTPATIENT)
Dept: NEUROLOGY | Facility: CLINIC | Age: 58
End: 2023-08-28
Payer: COMMERCIAL

## 2023-08-28 DIAGNOSIS — I10 HYPERTENSION GOAL BP (BLOOD PRESSURE) < 140/90: ICD-10-CM

## 2023-08-28 RX ORDER — SPIRONOLACTONE 25 MG/1
TABLET ORAL
Qty: 45 TABLET | Refills: 9 | Status: SHIPPED | OUTPATIENT
Start: 2023-08-28 | End: 2024-05-31

## 2023-08-28 NOTE — TELEPHONE ENCOUNTER
LVM x 3, no MyC ...schedule in general neurology for neuralgia's possible peripheral neuropathy     8/28/23 BD

## 2023-08-28 NOTE — TELEPHONE ENCOUNTER
Health Call Center    Phone Message    May a detailed message be left on voicemail: yes     Reason for Call: Medication Refill Request    Has the patient contacted the pharmacy for the refill? Yes   Name of medication being requested: spironolactone (ALDACTONE) 25 MG tablet   Provider who prescribed the medication: Dr Quan   Pharmacy:    [id number] - [place], [place] - [address] AT [facility] OF [address]    Date medication is needed: By 8/30 pt will be out on Friday 9/1       Action Taken: Other: Cardiology    Travel Screening: Not Applicable    Thank you!  [facility]

## 2023-09-06 ENCOUNTER — TELEPHONE (OUTPATIENT)
Dept: RHEUMATOLOGY | Facility: CLINIC | Age: 58
End: 2023-09-06

## 2023-09-06 ENCOUNTER — OFFICE VISIT (OUTPATIENT)
Dept: RHEUMATOLOGY | Facility: CLINIC | Age: 58
End: 2023-09-06
Attending: INTERNAL MEDICINE
Payer: COMMERCIAL

## 2023-09-06 ENCOUNTER — LAB (OUTPATIENT)
Dept: LAB | Facility: CLINIC | Age: 58
End: 2023-09-06
Payer: COMMERCIAL

## 2023-09-06 ENCOUNTER — OFFICE VISIT (OUTPATIENT)
Dept: FAMILY MEDICINE | Facility: CLINIC | Age: 58
End: 2023-09-06
Payer: COMMERCIAL

## 2023-09-06 VITALS
BODY MASS INDEX: 30.36 KG/M2 | WEIGHT: 165 LBS | DIASTOLIC BLOOD PRESSURE: 81 MMHG | SYSTOLIC BLOOD PRESSURE: 115 MMHG | OXYGEN SATURATION: 96 % | HEIGHT: 62 IN | HEART RATE: 84 BPM

## 2023-09-06 VITALS
DIASTOLIC BLOOD PRESSURE: 81 MMHG | WEIGHT: 165 LBS | HEIGHT: 62 IN | HEART RATE: 84 BPM | SYSTOLIC BLOOD PRESSURE: 115 MMHG | BODY MASS INDEX: 30.36 KG/M2 | OXYGEN SATURATION: 96 %

## 2023-09-06 DIAGNOSIS — R32 URINARY INCONTINENCE, UNSPECIFIED TYPE: ICD-10-CM

## 2023-09-06 DIAGNOSIS — M79.10 MYALGIA: ICD-10-CM

## 2023-09-06 DIAGNOSIS — M31.30 GRANULOMATOSIS WITH POLYANGIITIS WITHOUT RENAL INVOLVEMENT (H): ICD-10-CM

## 2023-09-06 DIAGNOSIS — M31.30 GRANULOMATOSIS WITH POLYANGIITIS WITHOUT RENAL INVOLVEMENT (H): Primary | ICD-10-CM

## 2023-09-06 DIAGNOSIS — M79.605 PAIN IN BOTH LOWER EXTREMITIES: ICD-10-CM

## 2023-09-06 DIAGNOSIS — M79.604 PAIN IN BOTH LOWER EXTREMITIES: ICD-10-CM

## 2023-09-06 DIAGNOSIS — M25.50 ARTHRALGIA, UNSPECIFIED JOINT: ICD-10-CM

## 2023-09-06 DIAGNOSIS — I77.82 ANCA-ASSOCIATED VASCULITIS (H): ICD-10-CM

## 2023-09-06 DIAGNOSIS — R32 URINARY INCONTINENCE, UNSPECIFIED TYPE: Primary | ICD-10-CM

## 2023-09-06 DIAGNOSIS — I77.82 ANCA-ASSOCIATED VASCULITIS (H): Primary | ICD-10-CM

## 2023-09-06 LAB
ALBUMIN MFR UR ELPH: 7.9 MG/DL
ALBUMIN UR-MCNC: NEGATIVE MG/DL
APPEARANCE UR: CLEAR
BILIRUB UR QL STRIP: NEGATIVE
COLOR UR AUTO: ABNORMAL
CREAT UR-MCNC: 119 MG/DL
GLUCOSE UR STRIP-MCNC: >=1000 MG/DL
HGB UR QL STRIP: NEGATIVE
HYALINE CASTS: 1 /LPF
KETONES UR STRIP-MCNC: NEGATIVE MG/DL
LEUKOCYTE ESTERASE UR QL STRIP: NEGATIVE
MUCOUS THREADS #/AREA URNS LPF: PRESENT /LPF
NITRATE UR QL: NEGATIVE
PH UR STRIP: 5 [PH] (ref 5–7)
PROT/CREAT 24H UR: 0.07 MG/MG CR (ref 0–0.2)
RBC URINE: 1 /HPF
SP GR UR STRIP: 1.03 (ref 1–1.03)
SQUAMOUS EPITHELIAL: <1 /HPF
UROBILINOGEN UR STRIP-MCNC: NORMAL MG/DL
WBC URINE: 0 /HPF

## 2023-09-06 PROCEDURE — G0463 HOSPITAL OUTPT CLINIC VISIT: HCPCS | Performed by: INTERNAL MEDICINE

## 2023-09-06 PROCEDURE — 81001 URINALYSIS AUTO W/SCOPE: CPT | Performed by: PATHOLOGY

## 2023-09-06 PROCEDURE — 99214 OFFICE O/P EST MOD 30 MIN: CPT | Performed by: INTERNAL MEDICINE

## 2023-09-06 PROCEDURE — 84156 ASSAY OF PROTEIN URINE: CPT | Performed by: PATHOLOGY

## 2023-09-06 PROCEDURE — 99214 OFFICE O/P EST MOD 30 MIN: CPT | Performed by: FAMILY MEDICINE

## 2023-09-06 RX ORDER — DIPHENHYDRAMINE HYDROCHLORIDE 50 MG/ML
50 INJECTION INTRAMUSCULAR; INTRAVENOUS
Status: CANCELLED
Start: 2023-09-06

## 2023-09-06 RX ORDER — METHYLPREDNISOLONE SODIUM SUCCINATE 125 MG/2ML
81.25 INJECTION, POWDER, LYOPHILIZED, FOR SOLUTION INTRAMUSCULAR; INTRAVENOUS ONCE
Status: CANCELLED | OUTPATIENT
Start: 2023-09-06

## 2023-09-06 RX ORDER — HEPARIN SODIUM (PORCINE) LOCK FLUSH IV SOLN 100 UNIT/ML 100 UNIT/ML
5 SOLUTION INTRAVENOUS
Status: CANCELLED | OUTPATIENT
Start: 2023-09-06

## 2023-09-06 RX ORDER — HEPARIN SODIUM,PORCINE 10 UNIT/ML
5-20 VIAL (ML) INTRAVENOUS DAILY PRN
Status: CANCELLED | OUTPATIENT
Start: 2023-09-06

## 2023-09-06 RX ORDER — METHYLPREDNISOLONE SODIUM SUCCINATE 125 MG/2ML
125 INJECTION, POWDER, LYOPHILIZED, FOR SOLUTION INTRAMUSCULAR; INTRAVENOUS
Status: CANCELLED
Start: 2023-09-06

## 2023-09-06 RX ORDER — ALBUTEROL SULFATE 90 UG/1
1-2 AEROSOL, METERED RESPIRATORY (INHALATION)
Status: CANCELLED
Start: 2023-09-06

## 2023-09-06 RX ORDER — MEPERIDINE HYDROCHLORIDE 25 MG/ML
25 INJECTION INTRAMUSCULAR; INTRAVENOUS; SUBCUTANEOUS EVERY 30 MIN PRN
Status: CANCELLED | OUTPATIENT
Start: 2023-09-06

## 2023-09-06 RX ORDER — EPINEPHRINE 1 MG/ML
0.3 INJECTION, SOLUTION, CONCENTRATE INTRAVENOUS EVERY 5 MIN PRN
Status: CANCELLED | OUTPATIENT
Start: 2023-09-06

## 2023-09-06 RX ORDER — ALBUTEROL SULFATE 0.83 MG/ML
2.5 SOLUTION RESPIRATORY (INHALATION)
Status: CANCELLED | OUTPATIENT
Start: 2023-09-06

## 2023-09-06 RX ORDER — ACETAMINOPHEN 325 MG/1
650 TABLET ORAL ONCE
Status: CANCELLED
Start: 2023-09-06

## 2023-09-06 ASSESSMENT — PAIN SCALES - GENERAL: PAINLEVEL: MODERATE PAIN (5)

## 2023-09-06 NOTE — PATIENT INSTRUCTIONS
Rituximab 1 gram every 2 weeks x 2 this month, to be scheduled as soon as possible (GPA/ANCA-vasculitis)    Labs with rituximab    Consider going back on plaquenil in future if needed    Try zanflex instead of flexeril    Refer to water aerobics and acupuncture    Return in about 3-4 months (In person)

## 2023-09-06 NOTE — PROGRESS NOTES
"  Assessment & Plan     Urinary incontinence, unspecified type    - Adult Urology  Referral; Future  - UA Macroscopic with reflex to Microscopic and Culture - Lab Collect; Future    Pain in both lower extremities  Leandro PN she states she might go to [facility] if so will let us know  - Adult Neurology  Referral; Future    33 minutes spent by me on the date of the encounter doing chart review, history and exam, documentation and further activities per the note; Oct visit rvw mammogram pap shots, abdomen pain    BMI:   Estimated body mass index is 30.17 kg/m  as calculated from the following:    Height as of this encounter: 1.575 m (5' 2.01\").    Weight as of this encounter: 74.8 kg (165 lb).       Return in about 1 month (around 10/6/2023).    Kash Solano MD  [facility] PRIMARY CARE CLINIC [place]    Adrienne Mehta is a 58 year old, presenting for the following health issues:  Follow Up (Pt here for \"stomach issues\" and issue with getting sooner appt with neurology for feet.)    HPI Here in follow-up  1-feet painful, leandro LALO, cannot get into [facility] till early 2024, pt states needs sooner, will go to [facility], referred, I gave her printout with their number  2-chronic abdomen pain, GI notes rvwd, stable, wt stable  3-sees rheum today after me  4-offered flu shot, she will do one soon but not today  5-gradual onset urine urge type incontinence, some sense imcomplete emptying  Thrush comes and goes per pt    Past Medical History:   Diagnosis Date    Abnormal glandular Papanicolaou smear of cervix 1992    Allergic rhinitis     Allergy, airborne subst    Arthritis     ASCVD (arteriosclerotic cardiovascular disease)     Chronic pain     Chronic pancreatitis (H)     idiopathic, Tx: PPI, antioxidants, pancreatic enzymes    Common migraine     Coronary artery disease     Costochondritis     Difficulty in walking(719.7)     Dyspnea on exertion     Ectasia, mammary duct     " [facility], persistent n[facility] discharge    Elevated fasting glucose     Gastro-oesophageal reflux disease     Granulomatosis with polyangiitis (H)     Hernia     History of angina     Hyperlipidemia LDL goal < 100     Hypertension goal BP (blood pressure) < 140/90     Essential hypertension    Iron deficiency anemia     Ischemic cardiomyopathy     Menorrhagia     Migraine headaches     Mild persistent asthma     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 Matias, neg w/u, Rx'd w/prednisone    Ventral hernia, unspecified, without mention of obstruction or gangrene 2012     Past Surgical History:   Procedure Laterality Date    C/SECTION, LOW TRANSVERSE  1996        CARDIAC SURGERY      cardiac stent- recent in 16; 4 other stents    COLONOSCOPY N/A 2023    Procedure: COLONOSCOPY, WITH POLYPECTOMY;  Surgeon: Percy Gross MD;  Location: [facility]    DILATION AND CURETTAGE N/A 2016    Procedure: DILATION AND CURETTAGE;  Surgeon: Nahed Butler MD;  [facility]    ENDOMETRIAL SAMPLING (BIOPSY) N/A 2023    Procedure: ENDOMETRIAL BIOPSY;  Surgeon: Nahed Butler MD;  [facility]    ESOPHAGOSCOPY, GASTROSCOPY, DUODENOSCOPY (EGD), COMBINED N/A 2022    Procedure: ESOPHAGOGASTRODUODENOSCOPY, WITH BIOPSY;   Surgeon: Enzo Sesay MD;  [facility]    ESOPHAGOSCOPY, GASTROSCOPY, DUODENOSCOPY (EGD), COMBINED N/A 5/25/2023    Procedure: ESOPHAGOGASTRODUODENOSCOPY, WITH BIOPSY;  Surgeon: Percy Gross MD;  Location: [facility]    EXAM UNDER ANESTHESIA PELVIC N/A 4/28/2023    Procedure: EXAM UNDER ANESTHESIA;  Surgeon: Nahed Butler MD;  [facility]    HC UGI ENDOSCOPY W EUS  11/07/2008    Dr. Pastrana, possible chronic pancreatitis, fatty liver    HERNIA REPAIR  12/01/2012    [place] [facility] [place]    INSERT INTRAUTERINE DEVICE N/A 07/06/2016    Procedure: INSERT INTRAUTERINE DEVICE;  Surgeon: Nahed Butler MD;  [facility]    INT UTERINE FIBRIOD EMBOLIZATION  10/29/2014    LAPAROSCOPIC CHOLECYSTECTOMY  05/28/2008    Dr. Arnol GRUBBS TX, CERVICAL  1992    s/p LEEP, done at [facility] in [place].    ORBITOTOMY Right 03/15/2016    Procedure: ORBITOTOMY;  Surgeon: Myron Cyr MD;  Location: [facility]    ORBITOTOMY Right 08/04/2017    Procedure: ORBITOTOMY;  RIGHT ORBITOTOMY AND BIOPSY;  Surgeon: Charis Holbrook MD;  Location: [facility]    REMOVE INTRAUTERINE DEVICE N/A 4/28/2023    Procedure: Remove intrauterine device,;  Surgeon: Nahed Butler MD;  [facility]    REPAIR PTOSIS Right 11/17/2017    Procedure: REPAIR PTOSIS;  RIGHT UPPER LID PTOSIS REPAIR;  Surgeon: Myron Cyr MD;  Location: [facility]    UPPER GI ENDOSCOPY  10/21/2008    mild gastritis, Dr. Rocky CALDERA ECHO HEART XTHORACIC,COMPLETE, W/O DOPPLER  02/04/2004    [facility] WNL, EF 60%     Current Outpatient Medications   Medication    acetaminophen (TYLENOL) 325 MG tablet    ADVAIR DISKUS 250-50 MCG/ACT inhaler    albuterol (2.5 MG/3ML) 0.083% neb solution    albuterol (PROAIR HFA, PROVENTIL HFA, VENTOLIN HFA) 108 (90 BASE) MCG/ACT inhaler    BANOPHEN 25 MG tablet    blood glucose (NO BRAND SPECIFIED) lancets standard    blood glucose (NO BRAND SPECIFIED) test strip    Blood Pressure  Monitor KIT    calcium carbonate (TUMS) 500 MG chewable tablet    clopidogrel (PLAVIX) 75 MG tablet    clotrimazole (MYCELEX) 10 MG lozenge    cyclobenzaprine (FLEXERIL) 10 MG tablet    dicyclomine (BENTYL) 20 MG tablet    empagliflozin (JARDIANCE) 10 MG TABS tablet    EPIPEN 2-RIKY 0.3 MG/0.3ML injection    esomeprazole (NEXIUM) 40 MG DR capsule    estradiol (VAGIFEM) 10 MCG TABS vaginal tablet    fluticasone (FLONASE) 50 MCG/ACT nasal spray    folic acid (FOLVITE) 1 MG tablet    hydrocortisone (CORTAID) 1 % external cream    insulin glargine (LANTUS PEN) 100 UNIT/ML pen    insulin pen needle (BD PEN NEEDLE MARCK 2ND GEN) 32G X 4 MM miscellaneous    ketoconazole (NIZORAL) 2 % shampoo    levocetirizine (XYZAL) 5 MG tablet    lisinopril-hydrochlorothiazide (ZESTORETIC) 20-25 MG tablet    magnesium 250 MG tablet    metFORMIN (GLUCOPHAGE XR) 500 MG 24 hr tablet    methylPREDNISolone (MEDROL DOSEPAK) 4 MG tablet therapy pack    metoprolol succinate ER (TOPROL XL) 100 MG 24 hr tablet    Multiple Vitamin (TAB-A-GERALD) TABS    nitroGLYcerin (NITROSTAT) 0.4 MG sublingual tablet    olopatadine (PATANOL) 0.1 % ophthalmic solution    pravastatin (PRAVACHOL) 40 MG tablet    prochlorperazine (COMPAZINE) 5 MG tablet    sennosides (SENOKOT) 8.6 MG tablet    spironolactone (ALDACTONE) 25 MG tablet    sucralfate (CARAFATE) 1 GM tablet    terbinafine (LAMISIL) 1 % external cream    tiZANidine (ZANAFLEX) 4 MG tablet    traMADol (ULTRAM) 50 MG tablet    vitamin D2 (ERGOCALCIFEROL) 15727 units (1250 mcg) capsule     No current facility-administered medications for this visit.     Allergies   Allergen Reactions    Amoxicillin-Pot Clavulanate      Unknown possible hives and edema    Amoxicillin-Pot Clavulanate     Artificial Sweetner (Informational Only)  Other (See Comments)     Increased headache    Azithromycin     Clavulanic Acid     Diatrizoate Other (See Comments)     Pt wants this listed because she is allergic to shellfish      "Imitrex [Triptans]      Severe face/neck/chest tightness and flushing side effects     Penicillins Hives     Unknown     Pork Allergy      Stomach pain, cramping, diarrhea, itching, nausea and headaches    Shellfish Allergy Hives and Swelling    Shellfish-Derived Products     Sulfa Antibiotics Hives and Swelling    Sulfatolamide     Zithromax [Azithromycin Dihydrate] Swelling     Unknown              Review of Systems         Objective    /81 (BP Location: Right arm, Patient Position: Sitting, Cuff Size: Adult Regular)   Pulse 84   Ht 1.575 m (5' 2.01\")   Wt 74.8 kg (165 lb)   SpO2 96%   BMI 30.17 kg/m    Body mass index is 30.17 kg/m .  Physical Exam   GENERAL: healthy, alert and no distress  MS: no gross musculoskeletal defects noted, no edema, bilat feet no edema, no wounds, lt sensation intact, normal temp and color and brisk cap refill                  "

## 2023-09-06 NOTE — NURSING NOTE
"Janine Cornell is a 58 year old female patient that presents today in clinic for the following:    Chief Complaint   Patient presents with    Follow Up     Pt here for \"stomach issues\" and issue with getting sooner appt with neurology for feet.     The patient's allergies and medications were reviewed as noted. A set of vitals were recorded as noted without incident: /81 (BP Location: Right arm, Patient Position: Sitting, Cuff Size: Adult Regular)   Pulse 84   Ht 1.575 m (5' 2.01\")   Wt 74.8 kg (165 lb)   SpO2 96%   BMI 30.17 kg/m  . The patient does not have any other questions for the provider.    Kasandra Fletcher, EMT at 1:54 PM on 9/6/2023  "

## 2023-09-06 NOTE — LETTER
9/6/2023       RE: Janine Cornell  [address]  [facility] [address]     Dear Colleague,    Thank you for referring your patient, Janine Cornell, to the [facility] [place] at [facility]. Please see a copy of my visit note below.    Rheumatology F/U In Person Visit Note    Last visit note: 3/9/2023    Today's visit date: 9/6/2023    Reason for in person visit: F/U GPA    HPI     Ms. 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 She is s/p tx with HCQ since 5/2014, now off due to abnormal eye exam. On rituximab since 12/20218 with great response. Previously tried and did not tolerate MTX, AZA.    She had lateral orbitotomy and debulking orbital mass right eye on 9/26/18 with Dr. Shah and Dr. Valdez at [place]. 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 There were no complications according to the op note.      Today 9/6/2023:    -reports pain/swelling over hands/feet, has more arthritis problem over past year  -gets numbness over toes, bottom of feet, can't see neurology till 1/2024  -walking causes pain in the feet  -gets pain over hips after a trip to [facility], it is a new problem  -sometimes gets swelling over R cheek, noticed it yesterday outside with heat, her face was also bright red        3/9/2023: Reports pain/swelling of her hands. S/p last rituximab 1 gram on 9/7/2022.       1/25/2023: Janine is doing a phone visit for follow up, was feeling ill and switched in person to phone visit. She got sick around Thanksgiving, saw her PCP on 12/17, was tested positive for COVID, it took a longtime to feel better, did not take paxlovid as it was already past 2 weeks. Since then, she has not been feeling well. Has headaches, body ache, her eyes especially R eye look pink, they burn and itch a lot. Has sense of smell but can  not taste her food. She is very tired, hardly leaves her house. Last time, left house for doctor visit, was tired and sore. Has tingling and pinprick feeling over arms and legs. Has upcoming follow up with PCP on 2/10. She had thyroid US for thyroid nodules. She had abnormal screening mammogram on 1/9, will go back for diagnostic mammogram and US of L breast on 1/31.          08/19/2022  Reports fatigue and headaches. Headache worsens after taking Zofran for nausea. Joint symptoms come and go. Last rituximab on 4/6/22. Rituximab is helpful but feels like it wears off prior to the 6 months. Next appointment scheduled for October 2022. Develops intermittent vaginal yeast infection and thrush related to rising blood glucose. Right eye without symptoms. No new fevers, chills, skin changes. Son is wondering if she takes herbal supplements for headaches will this interact with any of her medications. Scheduled to meet with pharmacist today.        4/22/2022:   Each rituximab infusion causes vaginal yeast infection and thrush related to rise in BG. Her BG has stayed in higher level since last rituximab. Has more ache and pain, myalgia and arthralgia. Recently has had headaches with high BP. Had last rituximab 1000 mg on 4/6.Her R eye is itching and swelling up.  Today, her BG is 177.Has gained 20 lbs since Feb 2022. Had severe pain in her arm after covid vaccine, it took a month to get better.      12/16/2021: Janine presents for follow up. Reports ESOB with cold, has ongoing joint pain. R eye pain is intermittent.  Reports headaches after rituximab 1 gram on 10/18/2021.  Last week, her R knee was hurting.      8/20/2021: Janine has pain over arms, knees. Some days, feel stiff all day long. Some days can't do stairs. Hard to tell if there is swelling as has more water retention during summer.  Some days, eye swells up like today. No fevers, SOB, CP or cough.  Has rosacea but some days wakes up with heat rash over sun. Her face  is peeling.  Sometimes can't lift things or grab things with pain from elbow to hands. Has shoulder and neck pain but this forearm pain is new.  Stopped HCQ in mid July due to concern for potential HCQ toxicity on OCT, her eye exam with Dr. Vernon has been re-scheduled and upcoming on 9/14 to address HCQ toxicity, pain is not worse off HCQ.  Not COVID vaccinated but is cautious.      5/10/2021: Joint ache, knees hurt, R elbow hurts, R arm hurts, R hand hurts. Has lots of swelling in her joints especially hands.    Depending on weather, joints jhurt, sometimes pain is 7/10.  Has problem with taking stairs and balance.  Gets off balance.   R eye gets tinglin, swelling.  Gets out of breath, gets worse with seasonal allergies.  Over past month and half, took nitro more, like 8 times.  No hemooptysis, no hematuria.  Has allergies.      4/21/2021: Had last rituximab 1 gram on 1/19, 2/2/2021. Reports rituximab starts to wear off earlier, has more sx this time. Eye swelling is intermittent. Gets indigestion/ GERD sx with constipation after the infusion.  She reports having asthma, swelling of neck, arms. She thinks that it could be from her vasculitis. She is worried about going down on rituximab dose.   Otherwise unchanged sx, no SOB or hemoptysis or persistent cough, or   Somedays her knees and hips hurt a lot, feel like bone on bone in her knees, especially on changing position.      Component      Latest Ref Rng 2/28/2013 2/28/2013          12:14 PM 12:14 PM   WBC      4.0 - 11.0 10e9/L     RBC Count      3.8 - 5.2 10e12/L     Hemoglobin      11.7 - 15.7 g/dL     Hematocrit      35.0 - 47.0 %     MCV      78 - 100 fl     MCH      26.5 - 33.0 pg     MCHC      31.5 - 36.5 g/dL     RDW      10.0 - 15.0 %     Platelet Count      150 - 450 10e9/L     Specimen Description           Lyme Screen IgG and IgM           Vitamin D Deficiency screening      30 - 75 ug/L     Ferritin      10 - 300 ng/mL     Sed Rate      0 - 20 mm/h      ALLI Screen by EIA      <1.0     Rheumatoid Factor      0 - 14 IU/mL     CK Total      30 - 225 U/L  78   Uric Acid      2.5 - 7.5 mg/dL 6.7      Component      Latest Ref Rng 2/28/2013          12:14 PM   WBC      4.0 - 11.0 10e9/L    RBC Count      3.8 - 5.2 10e12/L    Hemoglobin      11.7 - 15.7 g/dL    Hematocrit      35.0 - 47.0 %    MCV      78 - 100 fl    MCH      26.5 - 33.0 pg    MCHC      31.5 - 36.5 g/dL    RDW      10.0 - 15.0 %    Platelet Count      150 - 450 10e9/L    Specimen Description       Serum   Lyme Screen IgG and IgM       Test value: <0.75....Interpretation: Negative....If you highly suspect Lyme . . .   Vitamin D Deficiency screening      30 - 75 ug/L    Ferritin      10 - 300 ng/mL    Sed Rate      0 - 20 mm/h    ALLI Screen by EIA      <1.0    Rheumatoid Factor      0 - 14 IU/mL    CK Total      30 - 225 U/L    Uric Acid      2.5 - 7.5 mg/dL      Component      Latest Ref Rng 2/28/2013 2/28/2013 2/28/2013 2/28/2013          12:14 PM 12:14 PM 12:14 PM 12:14 PM   WBC      4.0 - 11.0 10e9/L       RBC Count      3.8 - 5.2 10e12/L       Hemoglobin      11.7 - 15.7 g/dL       Hematocrit      35.0 - 47.0 %       MCV      78 - 100 fl       MCH      26.5 - 33.0 pg       MCHC      31.5 - 36.5 g/dL       RDW      10.0 - 15.0 %       Platelet Count      150 - 450 10e9/L       Specimen Description             Lyme Screen IgG and IgM             Vitamin D Deficiency screening      30 - 75 ug/L       Ferritin      10 - 300 ng/mL    10   Sed Rate      0 - 20 mm/h   23 (H)    ALLI Screen by EIA      <1.0  <1.0 . . .     Rheumatoid Factor      0 - 14 IU/mL 26 (H)      CK Total      30 - 225 U/L       Uric Acid      2.5 - 7.5 mg/dL         5/2013  CBC WITH PLATELETS DIFFERENTIAL       Component Value Range    WBC 11.3 (*) 4.0 - 11.0 10e9/L    RBC Count 4.56  3.8 - 5.2 10e12/L    Hemoglobin 11.1 (*) 11.7 - 15.7 g/dL    Hematocrit 34.3 (*) 35.0 - 47.0 %    MCV 75 (*) 78 - 100 fl    MCH 24.3 (*) 26.5 - 33.0 pg     MCHC 32.4  31.5 - 36.5 g/dL    RDW 16.1 (*) 10.0 - 15.0 %    Platelet Count 315  150 - 450 10e9/L    Diff Method Automated Method      % Neutrophils 71.6  40 - 75 %    % Lymphocytes 20.9  20 - 48 %    % Monocytes 4.3  0 - 12 %    % Eosinophils 2.8  0 - 6 %    % Basophils 0.2  0 - 2 %    % Immature Granulocytes 0.2  0 - 0.4 %    Absolute Neutrophil 8.1  1.6 - 8.3 10e9/L    Absolute Lymphocytes 2.4  0.8 - 5.3 10e9/L    Absolute Monoctyes 0.5  0.0 - 1.3 10e9/L    Absolute Eosinophils 0.3  0.0 - 0.7 10e9/L    Absolute Basophils 0.0  0.0 - 0.2 10e9/L    Abs Immature Granulocytes 0.0  0 - 0.03 10e9/L   AMYLASE       Component Value Range    Amylase 103  30 - 110 U/L   COMPREHENSIVE METABOLIC PANEL       Component Value Range    Sodium 144  133 - 144 mmol/L    Potassium 3.8  3.4 - 5.3 mmol/L    Chloride 105  94 - 109 mmol/L    Carbon Dioxide 23  20 - 32 mmol/L    Anion Gap 17  6 - 17 mmol/L    Glucose 173 (*) 60 - 99 mg/dL    Urea Nitrogen 13  5 - 24 mg/dL    Creatinine 0.83  0.52 - 1.04 mg/dL    GFR Estimate 74  >60 mL/min/1.7m2    GFR Estimate If Black 90  >60 mL/min/1.7m2    Calcium 9.7  8.5 - 10.4 mg/dL    Bilirubin Total 0.4  0.2 - 1.3 mg/dL    Albumin 4.3  3.9 - 5.1 g/dL    Protein Total 7.8  6.8 - 8.8 g/dL    Alkaline Phosphatase 66  40 - 150 U/L    ALT 36  0 - 50 U/L    AST 28  0 - 45 U/L   CK TOTAL       Component Value Range    CK Total 66  30 - 225 U/L   CRP INFLAMMATION       Component Value Range    CRP Inflammation 10.4 (*) 0.0 - 8.0 mg/L   LIPASE       Component Value Range    Lipase 353 (*) 20 - 250 U/L   ERYTHROCYTE SEDIMENTATION RATE AUTO       Component Value Range    Sed Rate 26 (*) 0 - 20 mm/h   ROUTINE UA WITH MICROSCOPIC REFLEX TO CULTURE       Component Value Range    Color Urine Yellow      Appearance Urine Slightly Cloudy      Glucose Urine 30 (*) NEG mg/dL    Bilirubin Urine Negative  NEG    Ketones Urine 5 (*) NEG mg/dL    Specific Gravity Urine 1.026  1.003 - 1.035    Blood Urine Trace (*)  NEG    pH Urine 5.0  5.0 - 7.0 pH    Protein Albumin Urine 10 (*) NEG mg/dL    Urobilinogen mg/dL Normal  0.0 - 2.0 mg/dL    Nitrite Urine Negative  NEG    Leukocyte Esterase Urine Negative  NEG    Source Midstream Urine      WBC Urine 1  0 - 2 /HPF    RBC Urine 4 (*) 0 - 2 /HPF    Squamous Epithelial /HPF Urine <1  0 - 1 /HPF    Mucous Urine Present (*) NEG /LPF    Hyaline Casts 3 (*) 0 - 2 /LPF    Calcium Oxalate Moderate (*) NEG /HPF   COMPLEMENT C3       Component Value Range    Complement C3 143  76 - 169 mg/dL   COMPLEMENT C4       Component Value Range    Complement C4 31  15 - 50 mg/dL   HEPATITIS C ANTIBODY       Component Value Range    Hepatitis C Antibody Negative  NEG   CARDIOLIPIN ANTIBODY IGG AND IGM       Component Value Range    Cardiolipin IgG Marline    0 - 15.0 GPL    Value: <15.0      Interpretation:  Negative    Cardiolipin IgM Marline    0 - 12.5 MPL    Value: <12.5      Interpretation:  Negative   LUPUS PANEL       Component Value Range    Lupus Result    NEG    Value: Negative      (Note)      COMMENTS:      The INR is normal.      APTT is normal.  1:2 Mix is not indicated.      DRVVT Screen is normal.      Thrombin time is normal.      NEGATIVE TEST; A LUPUS ANTICOAGULANT WAS NOT DETECTED IN THIS      SPECIMEN WITHIN THE LIMITS OF THE TESTING REPERTOIRE.      If the clinical picture is strongly suggestive of an antiphospholipid      syndrome, recommend anticardiolipin and beta-2-glycoprotein (IgG and      IgM) antibody tests.      Leela Franks M.D.  [phone]      5/2/2013            INR =  0.93 N = 0.86-1.14  (No additional charge)      TT = 15.7 N = 13.0-19.0 sec  (No additional charge)            APTT'S:    Seconds      Reagent =  Stago LA      Normal  =  38      Patient  =  34      1:2 Mix  =  N/A      Reference =  31-43             DILUTE MADELINE VIPER VENOM TEST:      DRVVT Screen Ratio = 0.76 Normal = <1.21         IMMUNOGLOBULIN G SUBCLASSES       Component Value Range    IGG  1030  695 - 1620 mg/dL    IgG1 488  300 - 856 mg/dL    IgG2 325  158 - 761 mg/dL    IgG3 47  24 - 192 mg/dL    IgG4 18  11 - 86 mg/dL   SUNNY ANTIBODY PANEL       Component Value Range    Ribonucleic Protein IgG Antibody 0      Smith Antibody IgG 1      SSA (RO) Antibody IgG 4      SSB (LA) Antibody IgG 0      Scleroderma Antibody IgG 0     BETA 2 GLYCOPROTEIN ANTIBODIES IGG IGM       Component Value Range    Beta-2-Glycopro IgG 1      Beta-2-Glycopro IgM 5     CYCLIC CIT PEPT IGG       Component Value Range    Cyclic Cit Pept IgG/IgA    <20 UNITS    Value: <20      Interpretation:  Negative   DNA DOUBLE STRANDED ANTIBODIES       Component Value Range    DNA-ds    0 - 29 IU/mL    Value: <15      Interpretation:  Negative       CT CHEST PULMONARY EMBOLISM W CONTRAST 5/20/2015 4:57 PM  HISTORY: Pain. SOB. Elevated d-dimer.   TECHNIQUE: 65 mL Isovue 370. Axial images with coronal  reconstructions.  COMPARISON: None.  FINDINGS: Calcified granuloma with surrounding scarring in the  posterolateral left upper lobe. Triangular shaped opacity at the right  lung base in the lateral right middle lobe could represent some  scarring, atelectasis or infiltrate. There is also some scarring or  atelectasis in the posteromedial right lung base. Lungs otherwise  clear.  The pulmonary arteries are well opacified. No CT evidence for acute  pulmonary embolus. No aortic dissection.  Diffuse fatty infiltration of the liver.  IMPRESSION  IMPRESSION:   1. No pulmonary embolus identified.  2. Small focus of atelectasis, infiltrate or scarring in the lateral  right middle lobe.  3. Old granulomatous disease.  4. Otherwise negative.  SILVERIO VAZQUEZ MD    Copath Report      Patient Name: FAVIO MARTINEZ   MR#: [id number]   Specimen #: [id number]   Collected: 3/15/2016   Received: 3/15/2016   Reported: 3/17/2016 13:33   Ordering Phy(s): PARVEEN ENRIQUEZ     ORIGINAL REPORT FOLLOWS ADDENDUM  ADDENDUM     TO ORIGINAL REPORT   Status: Signed Out  "  Date Ordered:3/18/2016   Date Complete:3/18/2016   Date Reported:3/18/2016 12:06   Signed Out By: Marianne Godfrey MD     INTERPRETATION:   This addendum is done to incorporate the results of fungal (GMS) stains   done on the specimen.  Specimen is negative for fungal organisms.  The   original final diagnosis remains unchanged.     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  Negative for malignancy(Please   see microscopic description)     Electronically signed out by:     Marianne Godfrey MD     CLINICAL HISTORY:   right orbital mass     GROSS:   The specimen is received labeled \"right orbital biopsy\" and consists of   red-tan nodule measuring 1.5 x 0.9 x 0.6 cm with one smooth side and   opposite rough side consistent with periosteum.  The specimen is   bisected revealing homogenous pale tan fleshy cut surface.  Touch   preparations are prepared, air dried and fixed, portion of specimen is   submitted in RPMI for possible lymphoma workup [facility],   ([facility], [place], [place] ).  The remainder is entirely submitted.   (Dictated by: Marianne Godfrey MD 3/15/2016 04:45 PM)     MICROSCOPIC:     Specimen consists of portion of lacrimal gland with acute and chronic   inflammation.  Focal area of microabscess formation associated with   small areas of necrosis are also present.  Inflammation is seen   extending to the periorbital adipose tissue forming panniculitis.   Specimen is negative for malignancy.  Samples sent for immunophenotyping   to [facility], ([employer], [place], [place] ) reveals no evidence   of monoclonality or aberrant antigen expression.  A GMS (fungal) stain   is pending and results will be reported as an addendum.     CPT Codes:   A: 39690-NZ1, 25353-LOXKF, SO, " 60259-KNMFW, 51540-OGWN     TESTING LAB LOCATION:   [address]  [address] [phone]     COLLECTION SITE:   Client: [facility]   Location: [facility] (S)            Component      Latest Ref Rng 4/29/2016   Nucleated RBCs      0 /100 0   Absolute Neutrophil      1.6 - 8.3 10e9/L 8.9 (H)   Absolute Lymphocytes      0.8 - 5.3 10e9/L 3.2   Absolute Monocytes      0.0 - 1.3 10e9/L 0.8   Absolute Eosinophils      0.0 - 0.7 10e9/L 0.2   Absolute Basophils      0.0 - 0.2 10e9/L 0.1   Abs Immature Granulocytes      0 - 0.4 10e9/L 0.1   Absolute Nucleated RBC       0.0   IGG      695 - 1620 mg/dL 836   IgG1      300 - 856 mg/dL 280 (L)   IgG2      158 - 761 mg/dL 277   IgG3      24 - 192 mg/dL 35   IgG4      11 - 86 mg/dL 16   RNP Antibody IgG      0.0 - 0.9 AI <0.2 . . .   Smith SUNNY Antibody IgG      0.0 - 0.9 AI <0.2 . . .   SSA (Ro) (SUNNY) Antibody, IgG      0.0 - 0.9 AI <0.2 . . .   SSB (La) (SUNNY) Antibody, IgG      0.0 - 0.9 AI <0.2 . . .   Scleroderma Antibody Scl-70 SUNNY IgG      0.0 - 0.9 AI <0.2 . . .   Cholesterol      <200 mg/dL 154   Triglycerides      <150 mg/dL 220 (H)   HDL Cholesterol      >49 mg/dL 42 (L)   LDL Cholesterol Calculated      <100 mg/dL 67   Non HDL Cholesterol      <130 mg/dL 111   M Tuberculosis Result      NEG Negative   M Tuberculosis Antigen Value       0.00   Albumin      3.4 - 5.0 g/dL 4.3   ALT      0 - 50 U/L 30   AST      0 - 45 U/L 10   Complement C3      76 - 169 mg/dL 157   Complement C4      15 - 50 mg/dL 32   CRP Inflammation      0.0 - 8.0 mg/L <2.9   Sed Rate      0 - 20 mm/h 2   DNA-ds      <10 IU/mL 1   Cyclic Citrullinated Peptide Antibody, IgG      <7 U/mL 1   Rheumatoid Factor      <20 IU/mL <20   Proteinase 3 Antibody IgG      0.0 - 0.9 AI <0.2 . . .   Myeloperoxidase Antibody IgG      0.0 - 0.9 AI 2.5 (H)   Vitamin D Deficiency screening      20 - 75 ug/L 24   Hemoglobin A1C      4.3 - 6.0 % 7.9 (H)    ALLI by IFA IgG       1:40 (A) . . .     Component      Latest Ref Rng & Units 1/16/2021   WBC      4.0 - 11.0 10e9/L    RBC Count      3.8 - 5.2 10e12/L    Hemoglobin      11.7 - 15.7 g/dL    Hematocrit      35.0 - 47.0 %    MCV      78 - 100 fl    MCH      26.5 - 33.0 pg    MCHC      31.5 - 36.5 g/dL    RDW      10.0 - 15.0 %    Platelet Count      150 - 450 10e9/L    Diff Method          % Neutrophils      %    % Lymphocytes      %    % Monocytes      %    % Eosinophils      %    % Basophils      %    % Immature Granulocytes      %    Nucleated RBCs      0 /100    Absolute Neutrophil      1.6 - 8.3 10e9/L    Absolute Lymphocytes      0.8 - 5.3 10e9/L    Absolute Monocytes      0.0 - 1.3 10e9/L    Absolute Eosinophils      0.0 - 0.7 10e9/L    Absolute Basophils      0.0 - 0.2 10e9/L    Abs Immature Granulocytes      0 - 0.4 10e9/L    Absolute Nucleated RBC          IGG      610 - 1,616 mg/dL    IGA      84 - 499 mg/dL    IGM      35 - 242 mg/dL    Creatinine      0.52 - 1.04 mg/dL    GFR Estimate      >60 mL/min/1.73:m2    GFR Estimate If Black      >60 mL/min/1.73:m2    COVID-19 Virus PCR to [facility] - Source       Nasopharyngeal   COVID-19 Virus PCR to [facility] - Result       Not Detected   Neutrophil Cytoplasmic Antibody      <1:10 titer    Neutrophil Cytoplasmic Antibody Pattern          CD19 B Cells      6 - 27 %    Absolute CD19      107 - 698 cells/uL    Myeloperoxidase Antibody IgG      0.0 - 0.9 AI    Proteinase 3 Antibody IgG      0.0 - 0.9 AI    Vitamin D Deficiency screening      20 - 75 ug/L    Sed Rate      0 - 30 mm/h    CRP Inflammation      0.0 - 8.0 mg/L    Albumin      3.4 - 5.0 g/dL    ALT      0 - 50 U/L    AST      0 - 45 U/L      Component      Latest Ref Rng & Units 5/7/2021   WBC      4.0 - 11.0 10e9/L 8.9   RBC Count      3.8 - 5.2 10e12/L 4.89   Hemoglobin      11.7 - 15.7 g/dL 12.7   Hematocrit      35.0 - 47.0 % 39.7   MCV      78 - 100 fl 81   MCH      26.5 - 33.0 pg 26.0 (L)   MCHC       31.5 - 36.5 g/dL 32.0   RDW      10.0 - 15.0 % 13.7   Platelet Count      150 - 450 10e9/L 336   Diff Method       Automated Method   % Neutrophils      % 65.3   % Lymphocytes      % 20.7   % Monocytes      % 7.8   % Eosinophils      % 5.3   % Basophils      % 0.7   % Immature Granulocytes      % 0.2   Nucleated RBCs      0 /100 0   Absolute Neutrophil      1.6 - 8.3 10e9/L 5.8   Absolute Lymphocytes      0.8 - 5.3 10e9/L 1.8   Absolute Monocytes      0.0 - 1.3 10e9/L 0.7   Absolute Eosinophils      0.0 - 0.7 10e9/L 0.5   Absolute Basophils      0.0 - 0.2 10e9/L 0.1   Abs Immature Granulocytes      0 - 0.4 10e9/L 0.0   Absolute Nucleated RBC       0.0   IGG      610 - 1,616 mg/dL 649   IGA      84 - 499 mg/dL 199   IGM      35 - 242 mg/dL 36   Creatinine      0.52 - 1.04 mg/dL 0.96   GFR Estimate      >60 mL/min/1.73:m2 66   GFR Estimate If Black      >60 mL/min/1.73:m2 77   COVID-19 Virus PCR to [facility] - Source          COVID-19 Virus PCR to [facility] - Result          Neutrophil Cytoplasmic Antibody      <1:10 titer <1:10   Neutrophil Cytoplasmic Antibody Pattern       The ANCA IFA is <1:10.  No further testing will be performed.   CD19 B Cells      6 - 27 % <1 (L)   Absolute CD19      107 - 698 cells/uL <1 (L)   Myeloperoxidase Antibody IgG      0.0 - 0.9 AI 1.1 (H)   Proteinase 3 Antibody IgG      0.0 - 0.9 AI <0.2   Vitamin D Deficiency screening      20 - 75 ug/L 41   Sed Rate      0 - 30 mm/h 9   CRP Inflammation      0.0 - 8.0 mg/L <2.9   Albumin      3.4 - 5.0 g/dL 4.1   ALT      0 - 50 U/L 28   AST      0 - 45 U/L 18     Lab on 07/08/2022   Component Date Value Ref Range Status    Sodium 07/08/2022 143  133 - 144 mmol/L Final    Potassium 07/08/2022 3.9  3.4 - 5.3 mmol/L Final    Chloride 07/08/2022 108  94 - 109 mmol/L Final    Carbon Dioxide (CO2) 07/08/2022 25  20 - 32 mmol/L Final    Anion Gap 07/08/2022 10  3 - 14 mmol/L Final    Urea Nitrogen 07/08/2022 17  7 - 30 mg/dL Final    Creatinine  07/08/2022 1.01  0.52 - 1.04 mg/dL Final    Calcium 07/08/2022 9.3  8.5 - 10.1 mg/dL Final    Glucose 07/08/2022 103 (A) 70 - 99 mg/dL Final    GFR Estimate 07/08/2022 65  >60 mL/min/1.73m2 Final    Effective December 21, 2021 eGFRcr in adults is calculated using the 2021 CKD-EPI creatinine equation which includes age and gender (Ryan et al., [employer], DOI: 10.[facility]6/VXCOam0231672)    WBC Count 07/08/2022 12.0 (A) 4.0 - 11.0 10e3/uL Final    RBC Count 07/08/2022 4.94  3.80 - 5.20 10e6/uL Final    Hemoglobin 07/08/2022 12.6  11.7 - 15.7 g/dL Final    Hematocrit 07/08/2022 39.6  35.0 - 47.0 % Final    MCV 07/08/2022 80  78 - 100 fL Final    MCH 07/08/2022 25.5 (A) 26.5 - 33.0 pg Final    MCHC 07/08/2022 31.8  31.5 - 36.5 g/dL Final    RDW 07/08/2022 13.6  10.0 - 15.0 % Final    Platelet Count 07/08/2022 350  150 - 450 10e3/uL Final    % Neutrophils 07/08/2022 66  % Final    % Lymphocytes 07/08/2022 22  % Final    % Monocytes 07/08/2022 9  % Final    % Eosinophils 07/08/2022 3  % Final    % Basophils 07/08/2022 0  % Final    % Immature Granulocytes 07/08/2022 0  % Final    NRBCs per 100 WBC 07/08/2022 0  <1 /100 Final    Absolute Neutrophils 07/08/2022 7.9  1.6 - 8.3 10e3/uL Final    Absolute Lymphocytes 07/08/2022 2.7  0.8 - 5.3 10e3/uL Final    Absolute Monocytes 07/08/2022 1.1  0.0 - 1.3 10e3/uL Final    Absolute Eosinophils 07/08/2022 0.3  0.0 - 0.7 10e3/uL Final    Absolute Basophils 07/08/2022 0.1  0.0 - 0.2 10e3/uL Final    Absolute Immature Granulocytes 07/08/2022 0.0  <=0.4 10e3/uL Final    Absolute NRBCs 07/08/2022 0.0  10e3/uL Final     Component      Latest Ref Rng & Units 8/19/2022   WBC      4.0 - 11.0 10e3/uL 12.6 (H)   RBC Count      3.80 - 5.20 10e6/uL 5.06   Hemoglobin      11.7 - 15.7 g/dL 12.8   Hematocrit      35.0 - 47.0 % 40.4   MCV      78 - 100 fL 80   MCH      26.5 - 33.0 pg 25.3 (L)   MCHC      31.5 - 36.5 g/dL 31.7   RDW      10.0 - 15.0 % 14.1   Platelet Count      150 - 450 10e3/uL 387   %  Neutrophils      % 71   % Lymphocytes      % 20   % Monocytes      % 6   % Eosinophils      % 3   % Basophils      % 0   % Immature Granulocytes      % 0   NRBCs per 100 WBC      <1 /100 0   Absolute Neutrophils      1.6 - 8.3 10e3/uL 8.8 (H)   Absolute Lymphocytes      0.8 - 5.3 10e3/uL 2.5   Absolute Monocytes      0.0 - 1.3 10e3/uL 0.8   Absolute Eosinophils      0.0 - 0.7 10e3/uL 0.4   Absolute Basophils      0.0 - 0.2 10e3/uL 0.1   Absolute Immature Granulocytes      <=0.4 10e3/uL 0.1   Absolute NRBCs      10e3/uL 0.0   Color Urine      Colorless, Straw, Light Yellow, Yellow Straw   Appearance Urine      Clear Clear   Glucose Urine      Negative mg/dL 1000 (A)   Bilirubin Urine      Negative Negative   Ketones Urine      Negative mg/dL Negative   Specific Gravity Urine      1.003 - 1.035 1.020   Blood Urine      Negative Negative   pH Urine      5.0 - 7.0 5.0   Protein Albumin Urine      Negative mg/dL Negative   Urobilinogen mg/dL      Normal, 2.0 mg/dL Normal   Nitrite Urine      Negative Negative   Leukocyte Esterase Urine      Negative Negative   RBC Urine      <=2 /HPF 1   WBC Urine      <=5 /HPF 5   Squamous Epithelial /HPF Urine      <=1 /HPF <1   MPO Marline IgG Instrument Value      <3.5 U/mL 0.7   Myeloperoxidase Antibody IgG      Negative Negative   Proteinase 3 Marline IgG Instrument Value      <2.0 U/mL <1.0   Proteinase 3 Antibody IgG      Negative Negative   Protein Random Urine      g/L 0.11   Protein Total Urine g/gr Creatinine      0.00 - 0.20 g/g Cr 0.09   Creatinine Urine      mg/dL 128   IGG      610 - 1,616 mg/dL 641   IGA      84 - 499 mg/dL 204   IGM      35 - 242 mg/dL 32 (L)   Creatinine      0.52 - 1.04 mg/dL 1.24 (H)   GFR Estimate      >60 mL/min/1.73m2 51 (L)   CD19 B Cells      6 - 27 % <1 (L)   Absolute CD19      107 - 698 cells/uL <1 (L)   Neutrophil Cytoplasmic Antibody      <1:10 <1:10   Neutrophil Cytoplasmic Antibody Pattern       The ANCA IFA is <1:10.  No further testing will be  performed.   AST      0 - 45 U/L 16   ALT      0 - 50 U/L 34   Albumin      3.4 - 5.0 g/dL 4.2   CRP Inflammation      0.0 - 8.0 mg/L 9.1 (H)   Sed Rate      0 - 30 mm/hr 15   Vitamin D Deficiency screening      20 - 75 ug/L 36     Component      Latest Ref Rng & Units 1/19/2023   WBC      4.0 - 11.0 10e3/uL 16.1 (H)   RBC Count      3.80 - 5.20 10e6/uL 5.39 (H)   Hemoglobin      11.7 - 15.7 g/dL 13.4   Hematocrit      35.0 - 47.0 % 41.9   MCV      78 - 100 fL 78   MCH      26.5 - 33.0 pg 24.9 (L)   MCHC      31.5 - 36.5 g/dL 32.0   RDW      10.0 - 15.0 % 15.4 (H)   Platelet Count      150 - 450 10e3/uL 383   % Neutrophils      % 79   % Lymphocytes      % 16   % Monocytes      % 4   % Eosinophils      % 1   % Basophils      % 0   % Immature Granulocytes      % 0   NRBCs per 100 WBC      <1 /100 0   Absolute Neutrophils      1.6 - 8.3 10e3/uL 12.6 (H)   Absolute Lymphocytes      0.8 - 5.3 10e3/uL 2.6   Absolute Monocytes      0.0 - 1.3 10e3/uL 0.7   Absolute Eosinophils      0.0 - 0.7 10e3/uL 0.2   Absolute Basophils      0.0 - 0.2 10e3/uL 0.1   Absolute Immature Granulocytes      <=0.4 10e3/uL 0.1   Absolute NRBCs      10e3/uL 0.0   Sodium      136 - 145 mmol/L 137   Potassium      3.4 - 5.3 mmol/L 4.0   Chloride      98 - 107 mmol/L 98   Carbon Dioxide (CO2)      22 - 29 mmol/L 25   Anion Gap      7 - 15 mmol/L 14   Urea Nitrogen      6.0 - 20.0 mg/dL 23.6 (H)   Creatinine      0.51 - 0.95 mg/dL 1.09 (H)   Calcium      8.6 - 10.0 mg/dL 10.3 (H)   Glucose      70 - 99 mg/dL 232 (H)   Alkaline Phosphatase      35 - 104 U/L 95   AST      10 - 35 U/L 23   ALT      10 - 35 U/L 26   Protein Total      6.4 - 8.3 g/dL 7.7   Albumin      3.5 - 5.2 g/dL 4.9   Bilirubin Total      <=1.2 mg/dL 0.3   GFR Estimate      >60 mL/min/1.73m2 59 (L)   Color Urine      Colorless, Straw, Light Yellow, Yellow Light Yellow   Appearance Urine      Clear Clear   Glucose Urine      Negative mg/dL >=1000 (A)   Bilirubin Urine      Negative  Negative   Ketones Urine      Negative mg/dL 10 (A)   Specific Gravity Urine      1.003 - 1.035 1.033   Blood Urine      Negative Negative   pH Urine      5.0 - 7.0 6.0   Protein Albumin Urine      Negative mg/dL Negative   Urobilinogen mg/dL      Normal, 2.0 mg/dL Normal   Nitrite Urine      Negative Negative   Leukocyte Esterase Urine      Negative Negative   Iron      37 - 145 ug/dL 51   Iron Binding Capacity      240 - 430 ug/dL 362   Iron Sat Index      15 - 46 % 14 (L)   Parathyroid Hormone Intact      15 - 65 pg/mL 51   Calcium Ionized Whole Blood      4.4 - 5.2 mg/dL 4.9   Ferritin      11 - 328 ng/mL 70   TSH      0.30 - 4.20 uIU/mL 0.40   3 views cervical spine radiographs 2/10/2023 4:10 PM     History: neck pain; Neck pain     Comparison: MRI cervical spine 4/14/2015.     Findings:  AP, lateral, and odontoid views of the cervical spine were obtained.     Down to the level of C7 is well visualized on the lateral view(s). The  cervicothoracic junction is partially visualized.     There is no acute osseous abnormality. No prevertebral soft tissue  swelling. Normal cervical lordosis is maintained.       Moderate loss of intervertebral disc height at C6-7. Mild loss of disc  height at C5-6. Additional multilevel degenerative changes including  endplate osteophytosis and uncinate hypertrophy. Dens is obscured by  the overlying maxilla on the odontoid view.     The visualized lung apices are clear.                                                                      Impression:  Multilevel cervical degenerative changes, greatest at C6-7.     I have personally reviewed the examination and initial interpretation  and I agree with the findings.     TASHI KELLY MD      Component      Latest Ref Rng & Units 2/10/2023   WBC      4.0 - 11.0 10e3/uL 11.9 (H)   RBC Count      3.80 - 5.20 10e6/uL 5.30 (H)   Hemoglobin      11.7 - 15.7 g/dL 13.2   Hematocrit      35.0 - 47.0 % 40.7   MCV      78 - 100 fL 77 (L)   MCH       26.5 - 33.0 pg 24.9 (L)   MCHC      31.5 - 36.5 g/dL 32.4   RDW      10.0 - 15.0 % 15.3 (H)   Platelet Count      150 - 450 10e3/uL 415   % Neutrophils      % 69   % Lymphocytes      % 23   % Monocytes      % 6   % Eosinophils      % 2   % Basophils      % 0   % Immature Granulocytes      % 0   NRBCs per 100 WBC      <1 /100 0   Absolute Neutrophils      1.6 - 8.3 10e3/uL 8.1   Absolute Lymphocytes      0.8 - 5.3 10e3/uL 2.7   Absolute Monocytes      0.0 - 1.3 10e3/uL 0.8   Absolute Eosinophils      0.0 - 0.7 10e3/uL 0.2   Absolute Basophils      0.0 - 0.2 10e3/uL 0.0   Absolute Immature Granulocytes      <=0.4 10e3/uL 0.0   Absolute NRBCs      10e3/uL 0.0   Sodium      136 - 145 mmol/L 138   Potassium      3.4 - 5.3 mmol/L 4.1   Chloride      98 - 107 mmol/L 99   Carbon Dioxide (CO2)      22 - 29 mmol/L 23   Anion Gap      7 - 15 mmol/L 16 (H)   Urea Nitrogen      6.0 - 20.0 mg/dL 24.6 (H)   Creatinine      0.51 - 0.95 mg/dL 1.07 (H)   Calcium      8.6 - 10.0 mg/dL 10.5 (H)   Glucose      70 - 99 mg/dL 205 (H)   Alkaline Phosphatase      35 - 104 U/L 86   AST      10 - 35 U/L 26   ALT      10 - 35 U/L 30   Protein Total      6.4 - 8.3 g/dL 7.6   Albumin      3.5 - 5.2 g/dL 4.8   Bilirubin Total      <=1.2 mg/dL 0.2   GFR Estimate      >60 mL/min/1.73m2 61   MPO Marline IgG Instrument Value      <3.5 U/mL 0.8   Myeloperoxidase Antibody IgG      Negative Negative   Proteinase 3 Marline IgG Instrument Value      <2.0 U/mL <1.0   Proteinase 3 Antibody IgG      Negative Negative   IGG      610 - 1,616 mg/dL 680   IGA      84 - 499 mg/dL 197   IGM      35 - 242 mg/dL 30 (L)   CD19 B Cells      6 - 27 % <1 (L)   Absolute CD19      107 - 698 cells/uL <1 (L)   Neutrophil Cytoplasmic Antibody      <1:10 <1:10   Neutrophil Cytoplasmic Antibody Pattern       The ANCA IFA is <1:10.  No further testing will be performed.   % Retic      0.5 - 2.0 % 1.4   Absolute Retic      0.025 - 0.095 10e6/uL 0.073   CRP Inflammation      <5.00  mg/L 6.67 (H)   Sed Rate      0 - 30 mm/hr 12   Lipase      13 - 60 U/L 26     ROS: A comprehensive ROS was done. Positives are per HPI.      HISTORY REVIEW:  Past Medical History:   Diagnosis Date    Abnormal glandular Papanicolaou smear of cervix 1992    Allergic rhinitis     Allergy, airborne subst    Arthritis     ASCVD (arteriosclerotic cardiovascular disease)     Chronic pain     Chronic pancreatitis (H)     idiopathic, Tx: PPI, antioxidants, pancreatic enzymes    Common migraine     Coronary artery disease     Costochondritis     Difficulty in walking(719.7)     Dyspnea on exertion     Ectasia, mammary duct     [facility], persistent nipple discharge    Elevated fasting glucose     Gastro-oesophageal reflux disease     Granulomatosis with polyangiitis (H)     Hernia     History of angina     Hyperlipidemia LDL goal < 100     Hypertension goal BP (blood pressure) < 140/90     Essential hypertension    Iron deficiency anemia     Ischemic cardiomyopathy     Menorrhagia     Migraine headaches     Mild persistent asthma     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 Matias, neg w/u, Rx'd w/prednisone    Ventral hernia, unspecified, without mention of obstruction or gangrene 07/2012     Past Surgical History:   Procedure Laterality Date    C/SECTION, LOW TRANSVERSE   1996        CARDIAC SURGERY      cardiac stent- recent in 16; 4 other stents    COLONOSCOPY N/A 2023    Procedure: COLONOSCOPY, WITH POLYPECTOMY;  Surgeon: Percy Gross MD;  Location: [facility] OR    DILATION AND CURETTAGE N/A 2016    Procedure: DILATION AND CURETTAGE;  Surgeon: Nahed Butler MD;  [facility]    ENDOMETRIAL SAMPLING (BIOPSY) N/A 2023    Procedure: ENDOMETRIAL BIOPSY;  Surgeon: Nahed Butler MD;  [facility]    ESOPHAGOSCOPY, GASTROSCOPY, DUODENOSCOPY (EGD), COMBINED N/A 2022    Procedure: ESOPHAGOGASTRODUODENOSCOPY, WITH BIOPSY;  Surgeon: Enzo Sesay MD;  [facility]    ESOPHAGOSCOPY, GASTROSCOPY, DUODENOSCOPY (EGD), COMBINED N/A 2023    Procedure: ESOPHAGOGASTRODUODENOSCOPY, WITH BIOPSY;  Surgeon: Percy Gross MD;  Location: [facility] OR    EXAM UNDER ANESTHESIA PELVIC N/A 2023    Procedure: EXAM UNDER ANESTHESIA;  Surgeon: Nahed Butler MD;  [facility]    HC UGI ENDOSCOPY W EUS  2008    Dr. Pastrana, possible chronic pancreatitis, fatty liver    HERNIA REPAIR  2012    done at [facility]    INSERT INTRAUTERINE DEVICE N/A 2016    Procedure: INSERT INTRAUTERINE DEVICE;  Surgeon: Nahed Butler MD;  [facility]    INT UTERINE FIBRIOD EMBOLIZATION  10/29/2014    LAPAROSCOPIC CHOLECYSTECTOMY  2008    Dr. Arnol GRUBBS TX, CERVICAL  1992    s/p LEEP, done at [facility] in [place].    ORBITOTOMY Right 03/15/2016    Procedure: ORBITOTOMY;  Surgeon: Myron Cyr MD;  Location: [facility]    ORBITOTOMY Right 2017    Procedure: ORBITOTOMY;  RIGHT ORBITOTOMY AND BIOPSY;  Surgeon: Charis Holbrook MD;  [facility]    REMOVE INTRAUTERINE DEVICE N/A 2023    Procedure: Remove intrauterine device,;  Surgeon: Nahed Butler MD;  [facility]    REPAIR PTOSIS Right 2017    Procedure: REPAIR PTOSIS;  RIGHT UPPER LID PTOSIS REPAIR;   Surgeon: Myron Cyr MD;  Location: [facility]    UPPER GI ENDOSCOPY  10/21/2008    mild gastritis, Dr. Rocky [facility]DERA ECHO HEART XTHORACIC,COMPLETE, W/O DOPPLER  2004    [facility] WNL, EF 60%     Family History   Problem Relation Age of Onset    Hypertension Mother     Arthritis Mother     Heart Disease Mother         long qt syndrome    Thyroid Disease Mother     C.A.D. Mother     Heart Disease Father 50        heart attack    Cerebrovascular Disease Father     Diabetes Father     Hypertension Father     Depression Father     C.A.D. Father     Neurologic Disorder Sister         migraines    Neurologic Disorder Sister         migraines    Heart Disease Brother 15        MI at 15, 16.     Arthritis Brother         he passed away, had severe arthritis at age 11    C.A.D. Brother     Anesthesia Reaction Son         PONV    Respiratory Son         asthma    Hypertension Maternal Aunt     Diabetes Maternal Uncle     Hypertension Maternal Uncle     Arthritis Maternal Uncle     Eye Disorder Maternal Uncle         cataracts    Cerebrovascular Disease Maternal Uncle     Family History Negative Other         neg for RA, SLE    Unknown/Adopted No family hx of         unknown neurological issues in her family, mother was adopted    Skin Cancer No family hx of         No known family hx of skin cancer    Deep Vein Thrombosis (DVT) No family hx of      Social History     Socioeconomic History    Marital status: Single     Spouse name: Not on file    Number of children: 1    Years of education: Not on file    Highest education level: Not on file   Occupational History     Employer: NONE    Tobacco Use    Smoking status: Former     Packs/day: 0.20     Years: 1.00     Pack years: 0.20     Types: Cigarettes     Quit date: 2016     Years since quittin.6    Smokeless tobacco: Never   Substance and Sexual Activity    Alcohol use: No     Alcohol/week: 0.0 standard drinks of alcohol    Drug use: No     Sexual activity: Not Currently   Other Topics Concern    Parent/sibling w/ CABG, MI or angioplasty before 65F 55M? Yes   Social History Narrative    Balanced Diet - sometimes    Osteoporosis Prevention Measures - Dairy servings per day: 2 servings weekly    Regular Exercise -  Yes Describe walking 4 times a week    Dental Exam - NO    Seatbelts used - Yes    Self Breast Exam - Yes    Abuse: Current or Past (Physical, Sexual or Emotional)- No    Do you have any concerns about STD's -  No    Do you feel safe in your environment - No    Guns stored in the home - No    Sunscreen used - Yes    Lipids -  YES - Date: 053102    Glucose -  YES - Date: 012804    Eye Exam - YES - Date: one year ago    Colon Cancer Screening - No    Hemoccults - NO    Pap Test -  YES - Date: 070904, remote history of LEEP    Mammography - YES - Date: last spring, would like to discuss, needs a referral to [facility]    DEXA - NO    Immunizations reviewed and up to date - Yes, last td given in 1997 or 1998     Social Determinants of Health     Financial Resource Strain: Not on file   Food Insecurity: Not on file   Transportation Needs: Not on file   Physical Activity: Not on file   Stress: Not on file   Social Connections: Not on file   Intimate Partner Violence: Not on file   Housing Stability: Not on file     Patient Active Problem List   Diagnosis    Seasonal affective disorder (H)    Allergic rhinitis    PCOS (polycystic ovarian syndrome)    Moderate persistent asthma    Chronic pancreatitis (H)    Hypertension goal BP (blood pressure) < 140/90    Common migraine    NSTEMI (non-ST elevated myocardial infarction) (H)    Hyperlipidemia LDL goal <70    ASCVD (arteriosclerotic cardiovascular disease)    GERD (gastroesophageal reflux disease)    Ischemic cardiomyopathy    Hypertensive heart disease    Uterine leiomyoma    Iron deficiency anemia    Costochondritis    Vitamin D deficiency    Breast cancer screening    Spinal  stenosis in cervical region    Fibromyalgia    Seronegative rheumatoid arthritis (H)    Type 2 diabetes, HbA1C goal < 8% (H)    Type 2 diabetes mellitus with other specified complication (H)    Hyperlipidemia associated with type 2 diabetes mellitus (H)    Hypertension associated with diabetes (H)    Overweight with body mass index (BMI) 25.0-29.9    Tobacco use disorder    Telogen effluvium    CAD S/P percutaneous coronary angioplasty    Status post coronary angiogram    ANCA-associated vasculitis (H)    Wegener's vasculitis    Type 1 diabetes mellitus with complications (H)    Chest discomfort    Urinary frequency    Abdominal pain, epigastric    Hypokalemia    Leukocytosis, unspecified type    Acute pancreatitis, unspecified complication status, unspecified pancreatitis type       Pregnancy Hx: She is . All miscarriages were in first trimester. H/o OC use in the past. 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    PMHx, FHx, SHx were reviewed, unchanged.    Outpatient Encounter Medications as of 2023   Medication Sig Dispense Refill    acetaminophen (TYLENOL) 325 MG tablet Take 1-2 tablets (325-650 mg) by mouth every 6 hours as needed for pain (headache) 250 tablet 4    ADVAIR DISKUS 250-50 MCG/ACT inhaler Inhale 1 puff into the lungs 2 times daily      albuterol (2.5 MG/3ML) 0.083% neb solution INL 1 VIAL VIA NEBULIZATION Q 4 TO 6 HOURS PRN  1    albuterol (PROAIR HFA, PROVENTIL HFA, VENTOLIN HFA) 108 (90 BASE) MCG/ACT inhaler Inhale 2 puffs into the lungs every 6 hours as needed for shortness of breath / dyspnea or wheezing 3 Inhaler 1    BANOPHEN 25 MG tablet Take 25 mg by mouth At Bedtime      blood glucose (NO BRAND SPECIFIED) lancets standard Use to test blood sugar 1-4 times daily or as directed. 400 each 3    blood glucose (NO BRAND SPECIFIED) test strip Use to test blood sugar 1-4 times daily or as directed. 400 strip 3    Blood Pressure Monitor KIT 1 each daily Monitor home blood  pressure as instructed by physician.  Dispense [place] blood pressure kit. 1 kit 0    calcium carbonate (TUMS) 500 MG chewable tablet Take 3-4 tablets (1,500-2,000 mg) by mouth daily as needed 90 tablet 3    clopidogrel (PLAVIX) 75 MG tablet Take 1 tablet (75 mg) by mouth daily . 90 tablet 3    clotrimazole (MYCELEX) 10 MG lozenge Place 1 lozenge (10 mg) inside cheek 5 times daily (Patient not taking: Reported on 8/1/2023) 50 lozenge 1    cyclobenzaprine (FLEXERIL) 10 MG tablet Take 1 tablet (10 mg) by mouth 2 times daily as needed for muscle spasms 60 tablet 3    dicyclomine (BENTYL) 20 MG tablet TAKE 1 TABLET(20 MG) BY MOUTH FOUR TIMES DAILY AS NEEDED. 60 tablet 0    empagliflozin (JARDIANCE) 10 MG TABS tablet Take 2 tablets (20 mg) by mouth daily (Patient taking differently: Take 20 mg by mouth At Bedtime) 90 tablet 3    EPIPEN 2-RIKY 0.3 MG/0.3ML injection INJECT 0.3 MG INTO THE MUSCLE PRF ANAPHYALAXIS  0    esomeprazole (NEXIUM) 40 MG DR capsule Take 1 capsule (40 mg) by mouth 2 times daily Take 30-60 minutes before eating. (Patient taking differently: Take 40 mg by mouth as needed Take 30-60 minutes before eating.) 180 capsule 0    estradiol (VAGIFEM) 10 MCG TABS vaginal tablet Place 1 tablet (10 mcg) vaginally twice a week (Patient not taking: Reported on 8/1/2023) 24 tablet 3    fluticasone (FLONASE) 50 MCG/ACT nasal spray Spray 1 spray in nostril as needed      folic acid (FOLVITE) 1 MG tablet Take 1 tablet (1 mg) by mouth daily (Patient taking differently: Take 1 mg by mouth At Bedtime) 90 tablet 3    hydrocortisone (CORTAID) 1 % external cream Apply topically 2 times daily (Patient taking differently: Apply topically as needed) 60 g 1    insulin glargine (LANTUS PEN) 100 UNIT/ML pen Inject 65 Units Subcutaneous every morning Or as directed. 75 mL 3    insulin pen needle (BD PEN NEEDLE MARCK 2ND GEN) 32G X 4 MM miscellaneous Use one pen needle daily or as directed. 100 each 3    ketoconazole (NIZORAL) 2 %  shampoo Apply topically daily as needed (Patient not taking: Reported on 8/1/2023)      levocetirizine (XYZAL) 5 MG tablet Take 5 mg by mouth as needed      lisinopril-hydrochlorothiazide (ZESTORETIC) 20-25 MG tablet Take 1 tablet by mouth daily 90 tablet 3    magnesium 250 MG tablet TAKE 1 TABLET(250 MG) BY MOUTH TWICE DAILY (Patient taking differently: Take 1 tablet by mouth At Bedtime) 60 tablet 3    metFORMIN (GLUCOPHAGE XR) 500 MG 24 hr tablet Take 4 tablets (2,000 mg) by mouth daily (with dinner) (Patient taking differently: Take 2,000 mg by mouth At Bedtime) 360 tablet 3    methylPREDNISolone (MEDROL DOSEPAK) 4 MG tablet therapy pack Follow Package Directions (Patient not taking: Reported on 8/1/2023) 21 tablet 0    metoprolol succinate ER (TOPROL XL) 100 MG 24 hr tablet Take 1 tablet (100 mg) by mouth daily 90 tablet 3    Multiple Vitamin (TAB-A-GERALD) TABS Take 1 tablet by mouth daily (Patient taking differently: Take 1 tablet by mouth At Bedtime) 90 tablet 1    nitroGLYcerin (NITROSTAT) 0.4 MG sublingual tablet Place 1 tablet (0.4 mg) under the tongue every 5 minutes as needed for chest pain . After 3 doses, if pain persists call 911. (Patient not taking: Reported on 8/1/2023) 25 tablet 3    olopatadine (PATANOL) 0.1 % ophthalmic solution Place 1 drop into both eyes as needed      pravastatin (PRAVACHOL) 40 MG tablet Take 1 tablet (40 mg) by mouth daily 90 tablet 3    prochlorperazine (COMPAZINE) 5 MG tablet Take 1 tablet (5 mg) by mouth every 6 hours as needed for nausea or vomiting 60 tablet 3    sennosides (SENOKOT) 8.6 MG tablet 1-2 tabs a day as needed for constipation 60 tablet 1    spironolactone (ALDACTONE) 25 MG tablet Take 1 tablet (25 mg) by mouth daily. May also take 0.5 tablets (12.5 mg) daily as needed (for weight gain). 45 tablet 9    sucralfate (CARAFATE) 1 GM tablet Take 1 tablet (1 g) by mouth 4 times daily 120 tablet 3    terbinafine (LAMISIL) 1 % external cream Apply topically 2 times  "daily (Patient taking differently: Apply topically as needed) 42 g 1    traMADol (ULTRAM) 50 MG tablet TAKE 1 TABLET(50 MG) BY MOUTH EVERY 8 HOURS AS NEEDED FOR SEVERE PAIN 60 tablet 3    vitamin D2 (ERGOCALCIFEROL) 14642 units (1250 mcg) capsule Take 1 capsule (50,000 Units) by mouth every 7 days (Patient not taking: Reported on 8/1/2023) 4 capsule 0     No facility-administered encounter medications on file as of 9/6/2023.       Allergies   Allergen Reactions    Amoxicillin-Pot Clavulanate      Unknown possible hives and edema    Amoxicillin-Pot Clavulanate     Artificial Sweetner (Informational Only)  Other (See Comments)     Increased headache    Azithromycin     Clavulanic Acid     Diatrizoate Other (See Comments)     Pt wants this listed because she is allergic to shellfish     Imitrex [Triptans]      Severe face/neck/chest tightness and flushing side effects     Penicillins Hives     Unknown     Pork Allergy      Stomach pain, cramping, diarrhea, itching, nausea and headaches    Shellfish Allergy Hives and Swelling    Shellfish-Derived Products     Sulfa Antibiotics Hives and Swelling    Sulfatolamide     Zithromax [Azithromycin Dihydrate] Swelling     Unknown        Ph.E:    /81   Pulse 84   Ht 1.575 m (5' 2\")   Wt 74.8 kg (165 lb)   SpO2 96%   BMI 30.18 kg/m      GA: NAD, pleasant  HEENT: nl conj, sclera, EOMI. No campos-orbital edema  Chest: CTAB  CV: no M/R/G, RRR  Abdomen: soft, NT  MSK: + active synovitis and joint tenderness over B/L 2nd/3rd MCPs  Skin: no rash  Neuro: non focal  Psych: nl affect    Assessment/Plan:    1-Seropositive non-erosive RA (arthritis, AM stiffness, high CRP, RF 26 but re-check neg 3/2015 on HCQ) Dx 5/2013, FMS, new pleuritic CP 3/20-15-5/2015 resolved on steroids. She is on  mg bid since 5/2014. She tolerates it well and it's helping some but not enough to control all her pain. Continued having flare ups of joint pain, could be GPA related. Some pain is due to " FMS.My impression is that her arthralgias are a combination of RA/ IA sec GPA, fibromyalgia and chronic pain.     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 Prednisone was not a good option for her given weight gain, diabetes. She tried and did not tolerate MTX, AZA.    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 Repeat MPO was positive in 6/2017.    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 Shah and Dr. Valdez at [place]. 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     She is on rituximab since 12/12/2018 with great response, minor allergic reaction which could be managed by pre-meds and extra steroid dose. Developed high BG and vaginal yeast infections after solumedrol premed. Treated candida with fluconazole. Will decrease solumerdol dose to IV 80 mg prior to receiving rituximab. 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 However, feels Rituximab effects wear off around 4 months. According to ACR guidelines can give every 4-6 months. Pt elected to received this upcoming September which will be approximately 5 months from last dose. Repeat Orbit CT in September 2021 demonstrated improvement.     Last visit in April 2022-- Recommended marie given b cell depletion tx as rituximab blocks the response to covid vaccine, she is concerned about side effects. I advised her to discuss with [facility] pharmacist.      1/25/2023: Janine has been ill since Dx of COVID on 12/17/2022. Her most recent labs were reviewed, stable vasculitis labs in 8/2022 with CD19<1, neg vasculitis markers. In 1/2023, no anemia and nl thyroid function test. I ordered vit D as add on. 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 She does need to follow up on abnormal thyroid US and mammogram as well. 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 I will see her in person, in early march to determine if it is ok to give another dose of rituxiamb. 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 Also ordered shower chair, commode per her request given significant fatigue, body pain.    3/9/2023: S/p last rituximab 1 gram on 9/7/2022. 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 Stable labs and eye inflammation.    2-Fat pads. She was seen by endocrinology and cushing was ruled out in 4/2014. Was advised to do f/u for enlarged thyroid/thyroid nodules.  3-Hair loss. Continue with dermatology  4-Chronic pain. F/U with pain clinic  5-Vit D deficiency. Was replaced with vit D 50, 000 units po qwk x 12 wk then 2000 units every day  6-?HCQ toxicity on OCT 7/2021, now off HCQ, could explain increased arthralgia  7- Chronic Headaches. Symptoms worsen after taking zofran. Has received compazine in hospital in the past without adverse effect. Will have patient trial Compazine       3/2023 plan:    Spine referral for abnormal C spine (DJD) in 2/2023.    Rituximab 1 gram one dose only this month    Labs in May 2023    Follow up eye exam 8/2023    Return in person in about 5-6 months      Today 9/6/2023: last rituximab 1 gram one dose for ANCA vasculitis on 3/28/2023, increased arthralgia, will try rituximab at full dose, zanaflex and consider resuming HCQ as last eye exam did rule out HCQ toxicity (HCQ was stopped with concern for possible HCQ  toxicity).    Plan:      Rituximab 1 gram every 2 weeks x 2 this month, to be scheduled as soon as possible (GPA/ANCA-vasculitis)    Labs with rituximab    Consider going back on plaquenil in future if needed    Try zanflex instead of flexeril    Refer to water aerobics and acupuncture    Return in about 3-4 months (In person)    Orders Placed This Encounter   Procedures    AST    ALT    Myeloperoxidase and Proteinase 3 Panel    Albumin level    Creatinine    CRP inflammation    UA with Microscopic reflex to Culture    Protein  random urine    Creatinine random urine    Erythrocyte sedimentation rate auto    CD19 B Cell Count    ANCA IgG by IFA with Reflex to Titer    Immunoglobulins A G and M    Acupuncture Referral    Physical Therapy Referral    CBC with Platelets & Differential       Majo Mckinnon MD

## 2023-09-06 NOTE — NURSING NOTE
"Chief Complaint   Patient presents with    RECHECK     ANCA associated vasculitis     Vital signs:      BP: 115/81 Pulse: 84     SpO2: 96 %     Height: 157.5 cm (5' 2\") Weight: 74.8 kg (165 lb)  Estimated body mass index is 30.18 kg/m  as calculated from the following:    Height as of this encounter: 1.575 m (5' 2\").    Weight as of this encounter: 74.8 kg (165 lb).      Beulah Fortune CMA   9/6/2023 3:12 PM    "

## 2023-09-06 NOTE — PROGRESS NOTES
Rheumatology F/U In Person Visit Note    Last visit note: 3/9/2023    Today's visit date: 9/6/2023    Reason for in person visit: F/U GPA    HPI     Ms. 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 She is s/p tx with HCQ since 5/2014, now off due to abnormal eye exam. On rituximab since 12/20218 with great response. Previously tried and did not tolerate MTX, AZA.    She had lateral orbitotomy and debulking orbital mass right eye on 9/26/18 with Dr. Shah and Dr. Valdez at [facility]. 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 There were no complications according to the op note.      Today 9/6/2023:    -reports pain/swelling over hands/feet, has more arthritis problem over past year  -gets numbness over toes, bottom of feet, can't see neurology till 1/2024  -walking causes pain in the feet  -gets pain over hips after a trip to [place] [place] [facility], it is a new problem  -sometimes gets swelling over R cheek, noticed it yesterday outside with heat, her face was also bright red        3/9/2023: Reports pain/swelling of her hands. S/p last rituximab 1 gram on 9/7/2022.       1/25/2023: Janine is doing a phone visit for follow up, was feeling ill and switched in person to phone visit. She got sick around Thanksgiving, saw her PCP on 12/17, was tested positive for COVID, it took a longtime to feel better, did not take paxlovid as it was already past 2 weeks. Since then, she has not been feeling well. Has headaches, body ache, her eyes especially R eye look pink, they burn and itch a lot. Has sense of smell but can not taste her food. She is very tired, hardly leaves her house. Last time, left house for doctor visit, was tired and sore. Has tingling and pinprick feeling over arms and legs. Has upcoming follow up with PCP on 2/10. She had thyroid US for thyroid nodules. She had abnormal screening mammogram on 1/9, will go  back for diagnostic mammogram and US of L breast on 1/31.          08/19/2022  Reports fatigue and headaches. Headache worsens after taking Zofran for nausea. Joint symptoms come and go. Last rituximab on 4/6/22. Rituximab is helpful but feels like it wears off prior to the 6 months. Next appointment scheduled for October 2022. Develops intermittent vaginal yeast infection and thrush related to rising blood glucose. Right eye without symptoms. No new fevers, chills, skin changes. Son is wondering if she takes herbal supplements for headaches will this interact with any of her medications. Scheduled to meet with pharmacist today.        4/22/2022:   Each rituximab infusion causes vaginal yeast infection and thrush related to rise in BG. Her BG has stayed in higher level since last rituximab. Has more ache and pain, myalgia and arthralgia. Recently has had headaches with high BP. Had last rituximab 1000 mg on 4/6.Her R eye is itching and swelling up.  Today, her BG is 177.Has gained 20 lbs since Feb 2022. Had severe pain in her arm after covid vaccine, it took a month to get better.      12/16/2021: Janine presents for follow up. Reports ESOB with cold, has ongoing joint pain. R eye pain is intermittent.  Reports headaches after rituximab 1 gram on 10/18/2021.  Last week, her R knee was hurting.      8/20/2021: Janine has pain over arms, knees. Some days, feel stiff all day long. Some days can't do stairs. Hard to tell if there is swelling as has more water retention during summer.  Some days, eye swells up like today. No fevers, SOB, CP or cough.  Has rosacea but some days wakes up with heat rash over sun. Her face is peeling.  Sometimes can't lift things or grab things with pain from elbow to hands. Has shoulder and neck pain but this forearm pain is new.  Stopped HCQ in mid July due to concern for potential HCQ toxicity on OCT, her eye exam with Dr. Vernon has been re-scheduled and upcoming on 9/14 to address HCQ  toxicity, pain is not worse off HCQ.  Not COVID vaccinated but is cautious.      5/10/2021: Joint ache, knees hurt, R elbow hurts, R arm hurts, R hand hurts. Has lots of swelling in her joints especially hands.    Depending on weather, joints jhurt, sometimes pain is 7/10.  Has problem with taking stairs and balance.  Gets off balance.   R eye gets tinglin, swelling.  Gets out of breath, gets worse with seasonal allergies.  Over past month and half, took nitro more, like 8 times.  No hemooptysis, no hematuria.  Has allergies.      4/21/2021: Had last rituximab 1 gram on 1/19, 2/2/2021. Reports rituximab starts to wear off earlier, has more sx this time. Eye swelling is intermittent. Gets indigestion/ GERD sx with constipation after the infusion.  She reports having asthma, swelling of neck, arms. She thinks that it could be from her vasculitis. She is worried about going down on rituximab dose.   Otherwise unchanged sx, no SOB or hemoptysis or persistent cough, or   Somedays her knees and hips hurt a lot, feel like bone on bone in her knees, especially on changing position.      Component      Latest Ref Rng 2/28/2013 2/28/2013          12:14 PM 12:14 PM   WBC      4.0 - 11.0 10e9/L     RBC Count      3.8 - 5.2 10e12/L     Hemoglobin      11.7 - 15.7 g/dL     Hematocrit      35.0 - 47.0 %     MCV      78 - 100 fl     MCH      26.5 - 33.0 pg     MCHC      31.5 - 36.5 g/dL     RDW      10.0 - 15.0 %     Platelet Count      150 - 450 10e9/L     Specimen Description           Lyme Screen IgG and IgM           Vitamin D Deficiency screening      30 - 75 ug/L     Ferritin      10 - 300 ng/mL     Sed Rate      0 - 20 mm/h     ALLI Screen by EIA      <1.0     Rheumatoid Factor      0 - 14 IU/mL     CK Total      30 - 225 U/L  78   Uric Acid      2.5 - 7.5 mg/dL 6.7      Component      Latest Ref Rng 2/28/2013          12:14 PM   WBC      4.0 - 11.0 10e9/L    RBC Count      3.8 - 5.2 10e12/L    Hemoglobin      11.7 - 15.7  g/dL    Hematocrit      35.0 - 47.0 %    MCV      78 - 100 fl    MCH      26.5 - 33.0 pg    MCHC      31.5 - 36.5 g/dL    RDW      10.0 - 15.0 %    Platelet Count      150 - 450 10e9/L    Specimen Description       Serum   Lyme Screen IgG and IgM       Test value: <0.75....Interpretation: Negative....If you highly suspect Lyme . . .   Vitamin D Deficiency screening      30 - 75 ug/L    Ferritin      10 - 300 ng/mL    Sed Rate      0 - 20 mm/h    ALLI Screen by EIA      <1.0    Rheumatoid Factor      0 - 14 IU/mL    CK Total      30 - 225 U/L    Uric Acid      2.5 - 7.5 mg/dL      Component      Latest Ref Rng 2/28/2013 2/28/2013 2/28/2013 2/28/2013          12:14 PM 12:14 PM 12:14 PM 12:14 PM   WBC      4.0 - 11.0 10e9/L       RBC Count      3.8 - 5.2 10e12/L       Hemoglobin      11.7 - 15.7 g/dL       Hematocrit      35.0 - 47.0 %       MCV      78 - 100 fl       MCH      26.5 - 33.0 pg       MCHC      31.5 - 36.5 g/dL       RDW      10.0 - 15.0 %       Platelet Count      150 - 450 10e9/L       Specimen Description             Lyme Screen IgG and IgM             Vitamin D Deficiency screening      30 - 75 ug/L       Ferritin      10 - 300 ng/mL    10   Sed Rate      0 - 20 mm/h   23 (H)    ALLI Screen by EIA      <1.0  <1.0 . . .     Rheumatoid Factor      0 - 14 IU/mL 26 (H)      CK Total      30 - 225 U/L       Uric Acid      2.5 - 7.5 mg/dL         5/2013  CBC WITH PLATELETS DIFFERENTIAL       Component Value Range    WBC 11.3 (*) 4.0 - 11.0 10e9/L    RBC Count 4.56  3.8 - 5.2 10e12/L    Hemoglobin 11.1 (*) 11.7 - 15.7 g/dL    Hematocrit 34.3 (*) 35.0 - 47.0 %    MCV 75 (*) 78 - 100 fl    MCH 24.3 (*) 26.5 - 33.0 pg    MCHC 32.4  31.5 - 36.5 g/dL    RDW 16.1 (*) 10.0 - 15.0 %    Platelet Count 315  150 - 450 10e9/L    Diff Method Automated Method      % Neutrophils 71.6  40 - 75 %    % Lymphocytes 20.9  20 - 48 %    % Monocytes 4.3  0 - 12 %    % Eosinophils 2.8  0 - 6 %    % Basophils 0.2  0 - 2 %    % Immature  Granulocytes 0.2  0 - 0.4 %    Absolute Neutrophil 8.1  1.6 - 8.3 10e9/L    Absolute Lymphocytes 2.4  0.8 - 5.3 10e9/L    Absolute Monoctyes 0.5  0.0 - 1.3 10e9/L    Absolute Eosinophils 0.3  0.0 - 0.7 10e9/L    Absolute Basophils 0.0  0.0 - 0.2 10e9/L    Abs Immature Granulocytes 0.0  0 - 0.03 10e9/L   AMYLASE       Component Value Range    Amylase 103  30 - 110 U/L   COMPREHENSIVE METABOLIC PANEL       Component Value Range    Sodium 144  133 - 144 mmol/L    Potassium 3.8  3.4 - 5.3 mmol/L    Chloride 105  94 - 109 mmol/L    Carbon Dioxide 23  20 - 32 mmol/L    Anion Gap 17  6 - 17 mmol/L    Glucose 173 (*) 60 - 99 mg/dL    Urea Nitrogen 13  5 - 24 mg/dL    Creatinine 0.83  0.52 - 1.04 mg/dL    GFR Estimate 74  >60 mL/min/1.7m2    GFR Estimate If Black 90  >60 mL/min/1.7m2    Calcium 9.7  8.5 - 10.4 mg/dL    Bilirubin Total 0.4  0.2 - 1.3 mg/dL    Albumin 4.3  3.9 - 5.1 g/dL    Protein Total 7.8  6.8 - 8.8 g/dL    Alkaline Phosphatase 66  40 - 150 U/L    ALT 36  0 - 50 U/L    AST 28  0 - 45 U/L   CK TOTAL       Component Value Range    CK Total 66  30 - 225 U/L   CRP INFLAMMATION       Component Value Range    CRP Inflammation 10.4 (*) 0.0 - 8.0 mg/L   LIPASE       Component Value Range    Lipase 353 (*) 20 - 250 U/L   ERYTHROCYTE SEDIMENTATION RATE AUTO       Component Value Range    Sed Rate 26 (*) 0 - 20 mm/h   ROUTINE UA WITH MICROSCOPIC REFLEX TO CULTURE       Component Value Range    Color Urine Yellow      Appearance Urine Slightly Cloudy      Glucose Urine 30 (*) NEG mg/dL    Bilirubin Urine Negative  NEG    Ketones Urine 5 (*) NEG mg/dL    Specific Gravity Urine 1.026  1.003 - 1.035    Blood Urine Trace (*) NEG    pH Urine 5.0  5.0 - 7.0 pH    Protein Albumin Urine 10 (*) NEG mg/dL    Urobilinogen mg/dL Normal  0.0 - 2.0 mg/dL    Nitrite Urine Negative  NEG    Leukocyte Esterase Urine Negative  NEG    Source Midstream Urine      WBC Urine 1  0 - 2 /HPF    RBC Urine 4 (*) 0 - 2 /HPF    Squamous Epithelial  /HPF Urine <1  0 - 1 /HPF    Mucous Urine Present (*) NEG /LPF    Hyaline Casts 3 (*) 0 - 2 /LPF    Calcium Oxalate Moderate (*) NEG /HPF   COMPLEMENT C3       Component Value Range    Complement C3 143  76 - 169 mg/dL   COMPLEMENT C4       Component Value Range    Complement C4 31  15 - 50 mg/dL   HEPATITIS C ANTIBODY       Component Value Range    Hepatitis C Antibody Negative  NEG   CARDIOLIPIN ANTIBODY IGG AND IGM       Component Value Range    Cardiolipin IgG Marline    0 - 15.0 GPL    Value: <15.0      Interpretation:  Negative    Cardiolipin IgM Marline    0 - 12.5 MPL    Value: <12.5      Interpretation:  Negative   LUPUS PANEL       Component Value Range    Lupus Result    NEG    Value: Negative      (Note)      COMMENTS:      The INR is normal.      APTT is normal.  1:2 Mix is not indicated.      DRVVT Screen is normal.      Thrombin time is normal.      NEGATIVE TEST; A LUPUS ANTICOAGULANT WAS NOT DETECTED IN THIS      SPECIMEN WITHIN THE LIMITS OF THE TESTING REPERTOIRE.      If the clinical picture is strongly suggestive of an antiphospholipid      syndrome, recommend anticardiolipin and beta-2-glycoprotein (IgG and      IgM) antibody tests.      Leela Franks M.D.  [phone]      5/2/2013            INR =  0.93 N = 0.86-1.14  (No additional charge)      TT = 15.7 N = 13.0-19.0 sec  (No additional charge)            APTT'S:    Seconds      Reagent =  Stago LA      Normal  =  38      Patient  =  34      1:2 Mix  =  N/A      Reference =  31-43             DILUTE MADELINE VIPER VENOM TEST:      DRVVT Screen Ratio = 0.76 Normal = <1.21         IMMUNOGLOBULIN G SUBCLASSES       Component Value Range    IGG 1030  695 - 1620 mg/dL    IgG1 488  300 - 856 mg/dL    IgG2 325  158 - 761 mg/dL    IgG3 47  24 - 192 mg/dL    IgG4 18  11 - 86 mg/dL   SUNNY ANTIBODY PANEL       Component Value Range    Ribonucleic Protein IgG Antibody 0      Smith Antibody IgG 1      SSA (RO) Antibody IgG 4      SSB (LA) Antibody IgG 0       Scleroderma Antibody IgG 0     BETA 2 GLYCOPROTEIN ANTIBODIES IGG IGM       Component Value Range    Beta-2-Glycopro IgG 1      Beta-2-Glycopro IgM 5     CYCLIC CIT PEPT IGG       Component Value Range    Cyclic Cit Pept IgG/IgA    <20 UNITS    Value: <20      Interpretation:  Negative   DNA DOUBLE STRANDED ANTIBODIES       Component Value Range    DNA-ds    0 - 29 IU/mL    Value: <15      Interpretation:  Negative       CT CHEST PULMONARY EMBOLISM W CONTRAST 5/20/2015 4:57 PM  HISTORY: Pain. SOB. Elevated d-dimer.   TECHNIQUE: 65 mL Isovue 370. Axial images with coronal  reconstructions.  COMPARISON: None.  FINDINGS: Calcified granuloma with surrounding scarring in the  posterolateral left upper lobe. Triangular shaped opacity at the right  lung base in the lateral right middle lobe could represent some  scarring, atelectasis or infiltrate. There is also some scarring or  atelectasis in the posteromedial right lung base. Lungs otherwise  clear.  The pulmonary arteries are well opacified. No CT evidence for acute  pulmonary embolus. No aortic dissection.  Diffuse fatty infiltration of the liver.  IMPRESSION  IMPRESSION:   1. No pulmonary embolus identified.  2. Small focus of atelectasis, infiltrate or scarring in the lateral  right middle lobe.  3. Old granulomatous disease.  4. Otherwise negative.  SILVERIO VAZQUEZ MD    Copath Report      Patient Name: FAVIO MARTINEZ   MR#: [id number]   Specimen #: [id number]   Collected: 3/15/2016   Received: 3/15/2016   Reported: 3/17/2016 13:33   Ordering Phy(s): PARVEEN ENRIQUEZ     ORIGINAL REPORT FOLLOWS ADDENDUM  ADDENDUM     TO ORIGINAL REPORT   Status: Signed Out   Date Ordered:3/18/2016   Date Complete:3/18/2016   Date Reported:3/18/2016 12:06   Signed Out By: Marianne Godfrey MD     INTERPRETATION:   This addendum is done to incorporate the results of fungal (GMS) stains   done on the specimen.  Specimen is negative for fungal organisms.  The   original final  "diagnosis remains unchanged.     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  Negative for malignancy(Please   see microscopic description)     Electronically signed out by:     Marianne Godfrey MD     CLINICAL HISTORY:   right orbital mass     GROSS:   The specimen is received labeled \"right orbital biopsy\" and consists of   red-tan nodule measuring 1.5 x 0.9 x 0.6 cm with one smooth side and   opposite rough side consistent with periosteum.  The specimen is   bisected revealing homogenous pale tan fleshy cut surface.  Touch   preparations are prepared, air dried and fixed, portion of specimen is   submitted in RPMI for possible lymphoma workup [facility],   ([facility] [employer], [place], [place] ).  The remainder is entirely submitted.   (Dictated by: Marianne Godfrey MD 3/15/2016 04:45 PM)     MICROSCOPIC:     Specimen consists of portion of lacrimal gland with acute and chronic   inflammation.  Focal area of microabscess formation associated with   small areas of necrosis are also present.  Inflammation is seen   extending to the periorbital adipose tissue forming panniculitis.   Specimen is negative for malignancy.  Samples sent for immunophenotyping   to [facility], ([employer], [place], [place] ) reveals no evidence   of monoclonality or aberrant antigen expression.  A GMS (fungal) stain   is pending and results will be reported as an addendum.     CPT Codes:   A: 75417-IT5, 27268-UMIYJ, SOH, 14513-ZMCHE, 55922-DTUB     TESTING LAB LOCATION:   [address]  [id number] [phone]     COLLECTION SITE:   [facility]   [facility]            Component      Latest Ref Rng 4/29/2016   Nucleated RBCs      0 " /100 0   Absolute Neutrophil      1.6 - 8.3 10e9/L 8.9 (H)   Absolute Lymphocytes      0.8 - 5.3 10e9/L 3.2   Absolute Monocytes      0.0 - 1.3 10e9/L 0.8   Absolute Eosinophils      0.0 - 0.7 10e9/L 0.2   Absolute Basophils      0.0 - 0.2 10e9/L 0.1   Abs Immature Granulocytes      0 - 0.4 10e9/L 0.1   Absolute Nucleated RBC       0.0   IGG      695 - 1620 mg/dL 836   IgG1      300 - 856 mg/dL 280 (L)   IgG2      158 - 761 mg/dL 277   IgG3      24 - 192 mg/dL 35   IgG4      11 - 86 mg/dL 16   RNP Antibody IgG      0.0 - 0.9 AI <0.2 . . .   Smith SUNNY Antibody IgG      0.0 - 0.9 AI <0.2 . . .   SSA (Ro) (SUNNY) Antibody, IgG      0.0 - 0.9 AI <0.2 . . .   SSB (La) (SUNNY) Antibody, IgG      0.0 - 0.9 AI <0.2 . . .   Scleroderma Antibody Scl-70 SUNNY IgG      0.0 - 0.9 AI <0.2 . . .   Cholesterol      <200 mg/dL 154   Triglycerides      <150 mg/dL 220 (H)   HDL Cholesterol      >49 mg/dL 42 (L)   LDL Cholesterol Calculated      <100 mg/dL 67   Non HDL Cholesterol      <130 mg/dL 111   M Tuberculosis Result      NEG Negative   M Tuberculosis Antigen Value       0.00   Albumin      3.4 - 5.0 g/dL 4.3   ALT      0 - 50 U/L 30   AST      0 - 45 U/L 10   Complement C3      76 - 169 mg/dL 157   Complement C4      15 - 50 mg/dL 32   CRP Inflammation      0.0 - 8.0 mg/L <2.9   Sed Rate      0 - 20 mm/h 2   DNA-ds      <10 IU/mL 1   Cyclic Citrullinated Peptide Antibody, IgG      <7 U/mL 1   Rheumatoid Factor      <20 IU/mL <20   Proteinase 3 Antibody IgG      0.0 - 0.9 AI <0.2 . . .   Myeloperoxidase Antibody IgG      0.0 - 0.9 AI 2.5 (H)   Vitamin D Deficiency screening      20 - 75 ug/L 24   Hemoglobin A1C      4.3 - 6.0 % 7.9 (H)   ALLI by IFA IgG       1:40 (A) . . .     Component      Latest Ref Rng & Units 1/16/2021   WBC      4.0 - 11.0 10e9/L    RBC Count      3.8 - 5.2 10e12/L    Hemoglobin      11.7 - 15.7 g/dL    Hematocrit      35.0 - 47.0 %    MCV      78 - 100 fl    MCH      26.5 - 33.0 pg    MCHC      31.5 - 36.5 g/dL     RDW      10.0 - 15.0 %    Platelet Count      150 - 450 10e9/L    Diff Method          % Neutrophils      %    % Lymphocytes      %    % Monocytes      %    % Eosinophils      %    % Basophils      %    % Immature Granulocytes      %    Nucleated RBCs      0 /100    Absolute Neutrophil      1.6 - 8.3 10e9/L    Absolute Lymphocytes      0.8 - 5.3 10e9/L    Absolute Monocytes      0.0 - 1.3 10e9/L    Absolute Eosinophils      0.0 - 0.7 10e9/L    Absolute Basophils      0.0 - 0.2 10e9/L    Abs Immature Granulocytes      0 - 0.4 10e9/L    Absolute Nucleated RBC          IGG      610 - 1,616 mg/dL    IGA      84 - 499 mg/dL    IGM      35 - 242 mg/dL    Creatinine      0.52 - 1.04 mg/dL    GFR Estimate      >60 mL/min/1.73:m2    GFR Estimate If Black      >60 mL/min/1.73:m2    COVID-19 Virus PCR to [facility] - Source       Nasopharyngeal   COVID-19 Virus PCR to [facility] - Result       Not Detected   Neutrophil Cytoplasmic Antibody      <1:10 titer    Neutrophil Cytoplasmic Antibody Pattern          CD19 B Cells      6 - 27 %    Absolute CD19      107 - 698 cells/uL    Myeloperoxidase Antibody IgG      0.0 - 0.9 AI    Proteinase 3 Antibody IgG      0.0 - 0.9 AI    Vitamin D Deficiency screening      20 - 75 ug/L    Sed Rate      0 - 30 mm/h    CRP Inflammation      0.0 - 8.0 mg/L    Albumin      3.4 - 5.0 g/dL    ALT      0 - 50 U/L    AST      0 - 45 U/L      Component      Latest Ref Rng & Units 5/7/2021   WBC      4.0 - 11.0 10e9/L 8.9   RBC Count      3.8 - 5.2 10e12/L 4.89   Hemoglobin      11.7 - 15.7 g/dL 12.7   Hematocrit      35.0 - 47.0 % 39.7   MCV      78 - 100 fl 81   MCH      26.5 - 33.0 pg 26.0 (L)   MCHC      31.5 - 36.5 g/dL 32.0   RDW      10.0 - 15.0 % 13.7   Platelet Count      150 - 450 10e9/L 336   Diff Method       Automated Method   % Neutrophils      % 65.3   % Lymphocytes      % 20.7   % Monocytes      % 7.8   % Eosinophils      % 5.3   % Basophils      % 0.7   % Immature Granulocytes      % 0.2    Nucleated RBCs      0 /100 0   Absolute Neutrophil      1.6 - 8.3 10e9/L 5.8   Absolute Lymphocytes      0.8 - 5.3 10e9/L 1.8   Absolute Monocytes      0.0 - 1.3 10e9/L 0.7   Absolute Eosinophils      0.0 - 0.7 10e9/L 0.5   Absolute Basophils      0.0 - 0.2 10e9/L 0.1   Abs Immature Granulocytes      0 - 0.4 10e9/L 0.0   Absolute Nucleated RBC       0.0   IGG      610 - 1,616 mg/dL 649   IGA      84 - 499 mg/dL 199   IGM      35 - 242 mg/dL 36   Creatinine      0.52 - 1.04 mg/dL 0.96   GFR Estimate      >60 mL/min/1.73:m2 66   GFR Estimate If Black      >60 mL/min/1.73:m2 77   COVID-19 Virus PCR to [facility] - Source          COVID-19 Virus PCR to [facility] - Result          Neutrophil Cytoplasmic Antibody      <1:10 titer <1:10   Neutrophil Cytoplasmic Antibody Pattern       The ANCA IFA is <1:10.  No further testing will be performed.   CD19 B Cells      6 - 27 % <1 (L)   Absolute CD19      107 - 698 cells/uL <1 (L)   Myeloperoxidase Antibody IgG      0.0 - 0.9 AI 1.1 (H)   Proteinase 3 Antibody IgG      0.0 - 0.9 AI <0.2   Vitamin D Deficiency screening      20 - 75 ug/L 41   Sed Rate      0 - 30 mm/h 9   CRP Inflammation      0.0 - 8.0 mg/L <2.9   Albumin      3.4 - 5.0 g/dL 4.1   ALT      0 - 50 U/L 28   AST      0 - 45 U/L 18     Lab on 07/08/2022   Component Date Value Ref Range Status     Sodium 07/08/2022 143  133 - 144 mmol/L Final     Potassium 07/08/2022 3.9  3.4 - 5.3 mmol/L Final     Chloride 07/08/2022 108  94 - 109 mmol/L Final     Carbon Dioxide (CO2) 07/08/2022 25  20 - 32 mmol/L Final     Anion Gap 07/08/2022 10  3 - 14 mmol/L Final     Urea Nitrogen 07/08/2022 17  7 - 30 mg/dL Final     Creatinine 07/08/2022 1.01  0.52 - 1.04 mg/dL Final     Calcium 07/08/2022 9.3  8.5 - 10.1 mg/dL Final     Glucose 07/08/2022 103 (A) 70 - 99 mg/dL Final     GFR Estimate 07/08/2022 65  >60 mL/min/1.73m2 Final    Effective December 21, 2021 eGFRcr in adults is calculated using the 2021 CKD-EPI creatinine equation  which includes age and gender (Ryan montes al., [facility], DOI: 10.1056/LBDIdd9882761)     WBC Count 07/08/2022 12.0 (A) 4.0 - 11.0 10e3/uL Final     RBC Count 07/08/2022 4.94  3.80 - 5.20 10e6/uL Final     Hemoglobin 07/08/2022 12.6  11.7 - 15.7 g/dL Final     Hematocrit 07/08/2022 39.6  35.0 - 47.0 % Final     MCV 07/08/2022 80  78 - 100 fL Final     MCH 07/08/2022 25.5 (A) 26.5 - 33.0 pg Final     MCHC 07/08/2022 31.8  31.5 - 36.5 g/dL Final     RDW 07/08/2022 13.6  10.0 - 15.0 % Final     Platelet Count 07/08/2022 350  150 - 450 10e3/uL Final     % Neutrophils 07/08/2022 66  % Final     % Lymphocytes 07/08/2022 22  % Final     % Monocytes 07/08/2022 9  % Final     % Eosinophils 07/08/2022 3  % Final     % Basophils 07/08/2022 0  % Final     % Immature Granulocytes 07/08/2022 0  % Final     NRBCs per 100 WBC 07/08/2022 0  <1 /100 Final     Absolute Neutrophils 07/08/2022 7.9  1.6 - 8.3 10e3/uL Final     Absolute Lymphocytes 07/08/2022 2.7  0.8 - 5.3 10e3/uL Final     Absolute Monocytes 07/08/2022 1.1  0.0 - 1.3 10e3/uL Final     Absolute Eosinophils 07/08/2022 0.3  0.0 - 0.7 10e3/uL Final     Absolute Basophils 07/08/2022 0.1  0.0 - 0.2 10e3/uL Final     Absolute Immature Granulocytes 07/08/2022 0.0  <=0.4 10e3/uL Final     Absolute NRBCs 07/08/2022 0.0  10e3/uL Final     Component      Latest Ref Rng & Units 8/19/2022   WBC      4.0 - 11.0 10e3/uL 12.6 (H)   RBC Count      3.80 - 5.20 10e6/uL 5.06   Hemoglobin      11.7 - 15.7 g/dL 12.8   Hematocrit      35.0 - 47.0 % 40.4   MCV      78 - 100 fL 80   MCH      26.5 - 33.0 pg 25.3 (L)   MCHC      31.5 - 36.5 g/dL 31.7   RDW      10.0 - 15.0 % 14.1   Platelet Count      150 - 450 10e3/uL 387   % Neutrophils      % 71   % Lymphocytes      % 20   % Monocytes      % 6   % Eosinophils      % 3   % Basophils      % 0   % Immature Granulocytes      % 0   NRBCs per 100 WBC      <1 /100 0   Absolute Neutrophils      1.6 - 8.3 10e3/uL 8.8 (H)   Absolute Lymphocytes      0.8 - 5.3  10e3/uL 2.5   Absolute Monocytes      0.0 - 1.3 10e3/uL 0.8   Absolute Eosinophils      0.0 - 0.7 10e3/uL 0.4   Absolute Basophils      0.0 - 0.2 10e3/uL 0.1   Absolute Immature Granulocytes      <=0.4 10e3/uL 0.1   Absolute NRBCs      10e3/uL 0.0   Color Urine      Colorless, Straw, Light Yellow, Yellow Straw   Appearance Urine      Clear Clear   Glucose Urine      Negative mg/dL 1000 (A)   Bilirubin Urine      Negative Negative   Ketones Urine      Negative mg/dL Negative   Specific Gravity Urine      1.003 - 1.035 1.020   Blood Urine      Negative Negative   pH Urine      5.0 - 7.0 5.0   Protein Albumin Urine      Negative mg/dL Negative   Urobilinogen mg/dL      Normal, 2.0 mg/dL Normal   Nitrite Urine      Negative Negative   Leukocyte Esterase Urine      Negative Negative   RBC Urine      <=2 /HPF 1   WBC Urine      <=5 /HPF 5   Squamous Epithelial /HPF Urine      <=1 /HPF <1   MPO Marline IgG Instrument Value      <3.5 U/mL 0.7   Myeloperoxidase Antibody IgG      Negative Negative   Proteinase 3 Marline IgG Instrument Value      <2.0 U/mL <1.0   Proteinase 3 Antibody IgG      Negative Negative   Protein Random Urine      g/L 0.11   Protein Total Urine g/gr Creatinine      0.00 - 0.20 g/g Cr 0.09   Creatinine Urine      mg/dL 128   IGG      610 - 1,616 mg/dL 641   IGA      84 - 499 mg/dL 204   IGM      35 - 242 mg/dL 32 (L)   Creatinine      0.52 - 1.04 mg/dL 1.24 (H)   GFR Estimate      >60 mL/min/1.73m2 51 (L)   CD19 B Cells      6 - 27 % <1 (L)   Absolute CD19      107 - 698 cells/uL <1 (L)   Neutrophil Cytoplasmic Antibody      <1:10 <1:10   Neutrophil Cytoplasmic Antibody Pattern       The ANCA IFA is <1:10.  No further testing will be performed.   AST      0 - 45 U/L 16   ALT      0 - 50 U/L 34   Albumin      3.4 - 5.0 g/dL 4.2   CRP Inflammation      0.0 - 8.0 mg/L 9.1 (H)   Sed Rate      0 - 30 mm/hr 15   Vitamin D Deficiency screening      20 - 75 ug/L 36     Component      Latest Ref Rng & Units 1/19/2023    WBC      4.0 - 11.0 10e3/uL 16.1 (H)   RBC Count      3.80 - 5.20 10e6/uL 5.39 (H)   Hemoglobin      11.7 - 15.7 g/dL 13.4   Hematocrit      35.0 - 47.0 % 41.9   MCV      78 - 100 fL 78   MCH      26.5 - 33.0 pg 24.9 (L)   MCHC      31.5 - 36.5 g/dL 32.0   RDW      10.0 - 15.0 % 15.4 (H)   Platelet Count      150 - 450 10e3/uL 383   % Neutrophils      % 79   % Lymphocytes      % 16   % Monocytes      % 4   % Eosinophils      % 1   % Basophils      % 0   % Immature Granulocytes      % 0   NRBCs per 100 WBC      <1 /100 0   Absolute Neutrophils      1.6 - 8.3 10e3/uL 12.6 (H)   Absolute Lymphocytes      0.8 - 5.3 10e3/uL 2.6   Absolute Monocytes      0.0 - 1.3 10e3/uL 0.7   Absolute Eosinophils      0.0 - 0.7 10e3/uL 0.2   Absolute Basophils      0.0 - 0.2 10e3/uL 0.1   Absolute Immature Granulocytes      <=0.4 10e3/uL 0.1   Absolute NRBCs      10e3/uL 0.0   Sodium      136 - 145 mmol/L 137   Potassium      3.4 - 5.3 mmol/L 4.0   Chloride      98 - 107 mmol/L 98   Carbon Dioxide (CO2)      22 - 29 mmol/L 25   Anion Gap      7 - 15 mmol/L 14   Urea Nitrogen      6.0 - 20.0 mg/dL 23.6 (H)   Creatinine      0.51 - 0.95 mg/dL 1.09 (H)   Calcium      8.6 - 10.0 mg/dL 10.3 (H)   Glucose      70 - 99 mg/dL 232 (H)   Alkaline Phosphatase      35 - 104 U/L 95   AST      10 - 35 U/L 23   ALT      10 - 35 U/L 26   Protein Total      6.4 - 8.3 g/dL 7.7   Albumin      3.5 - 5.2 g/dL 4.9   Bilirubin Total      <=1.2 mg/dL 0.3   GFR Estimate      >60 mL/min/1.73m2 59 (L)   Color Urine      Colorless, Straw, Light Yellow, Yellow Light Yellow   Appearance Urine      Clear Clear   Glucose Urine      Negative mg/dL >=1000 (A)   Bilirubin Urine      Negative Negative   Ketones Urine      Negative mg/dL 10 (A)   Specific Gravity Urine      1.003 - 1.035 1.033   Blood Urine      Negative Negative   pH Urine      5.0 - 7.0 6.0   Protein Albumin Urine      Negative mg/dL Negative   Urobilinogen mg/dL      Normal, 2.0 mg/dL Normal   Nitrite  Urine      Negative Negative   Leukocyte Esterase Urine      Negative Negative   Iron      37 - 145 ug/dL 51   Iron Binding Capacity      240 - 430 ug/dL 362   Iron Sat Index      15 - 46 % 14 (L)   Parathyroid Hormone Intact      15 - 65 pg/mL 51   Calcium Ionized Whole Blood      4.4 - 5.2 mg/dL 4.9   Ferritin      11 - 328 ng/mL 70   TSH      0.30 - 4.20 uIU/mL 0.40   3 views cervical spine radiographs 2/10/2023 4:10 PM     History: neck pain; Neck pain     Comparison: MRI cervical spine 4/14/2015.     Findings:  AP, lateral, and odontoid views of the cervical spine were obtained.     Down to the level of C7 is well visualized on the lateral view(s). The  cervicothoracic junction is partially visualized.     There is no acute osseous abnormality. No prevertebral soft tissue  swelling. Normal cervical lordosis is maintained.       Moderate loss of intervertebral disc height at C6-7. Mild loss of disc  height at C5-6. Additional multilevel degenerative changes including  endplate osteophytosis and uncinate hypertrophy. Dens is obscured by  the overlying maxilla on the odontoid view.     The visualized lung apices are clear.                                                                      Impression:  Multilevel cervical degenerative changes, greatest at C6-7.     I have personally reviewed the examination and initial interpretation  and I agree with the findings.     TASHI KELLY MD      Component      Latest Ref Rng & Units 2/10/2023   WBC      4.0 - 11.0 10e3/uL 11.9 (H)   RBC Count      3.80 - 5.20 10e6/uL 5.30 (H)   Hemoglobin      11.7 - 15.7 g/dL 13.2   Hematocrit      35.0 - 47.0 % 40.7   MCV      78 - 100 fL 77 (L)   MCH      26.5 - 33.0 pg 24.9 (L)   MCHC      31.5 - 36.5 g/dL 32.4   RDW      10.0 - 15.0 % 15.3 (H)   Platelet Count      150 - 450 10e3/uL 415   % Neutrophils      % 69   % Lymphocytes      % 23   % Monocytes      % 6   % Eosinophils      % 2   % Basophils      % 0   % Immature  Granulocytes      % 0   NRBCs per 100 WBC      <1 /100 0   Absolute Neutrophils      1.6 - 8.3 10e3/uL 8.1   Absolute Lymphocytes      0.8 - 5.3 10e3/uL 2.7   Absolute Monocytes      0.0 - 1.3 10e3/uL 0.8   Absolute Eosinophils      0.0 - 0.7 10e3/uL 0.2   Absolute Basophils      0.0 - 0.2 10e3/uL 0.0   Absolute Immature Granulocytes      <=0.4 10e3/uL 0.0   Absolute NRBCs      10e3/uL 0.0   Sodium      136 - 145 mmol/L 138   Potassium      3.4 - 5.3 mmol/L 4.1   Chloride      98 - 107 mmol/L 99   Carbon Dioxide (CO2)      22 - 29 mmol/L 23   Anion Gap      7 - 15 mmol/L 16 (H)   Urea Nitrogen      6.0 - 20.0 mg/dL 24.6 (H)   Creatinine      0.51 - 0.95 mg/dL 1.07 (H)   Calcium      8.6 - 10.0 mg/dL 10.5 (H)   Glucose      70 - 99 mg/dL 205 (H)   Alkaline Phosphatase      35 - 104 U/L 86   AST      10 - 35 U/L 26   ALT      10 - 35 U/L 30   Protein Total      6.4 - 8.3 g/dL 7.6   Albumin      3.5 - 5.2 g/dL 4.8   Bilirubin Total      <=1.2 mg/dL 0.2   GFR Estimate      >60 mL/min/1.73m2 61   MPO Marline IgG Instrument Value      <3.5 U/mL 0.8   Myeloperoxidase Antibody IgG      Negative Negative   Proteinase 3 Marline IgG Instrument Value      <2.0 U/mL <1.0   Proteinase 3 Antibody IgG      Negative Negative   IGG      610 - 1,616 mg/dL 680   IGA      84 - 499 mg/dL 197   IGM      35 - 242 mg/dL 30 (L)   CD19 B Cells      6 - 27 % <1 (L)   Absolute CD19      107 - 698 cells/uL <1 (L)   Neutrophil Cytoplasmic Antibody      <1:10 <1:10   Neutrophil Cytoplasmic Antibody Pattern       The ANCA IFA is <1:10.  No further testing will be performed.   % Retic      0.5 - 2.0 % 1.4   Absolute Retic      0.025 - 0.095 10e6/uL 0.073   CRP Inflammation      <5.00 mg/L 6.67 (H)   Sed Rate      0 - 30 mm/hr 12   Lipase      13 - 60 U/L 26     ROS: A comprehensive ROS was done. Positives are per HPI.      HISTORY REVIEW:  Past Medical History:   Diagnosis Date     Abnormal glandular Papanicolaou smear of cervix 1992     Allergic rhinitis      Allergy, airborne subst     Arthritis      ASCVD (arteriosclerotic cardiovascular disease)      Chronic pain      Chronic pancreatitis (H)     idiopathic, Tx: PPI, antioxidants, pancreatic enzymes     Common migraine      Coronary artery disease      Costochondritis      Difficulty in walking(719.7)      Dyspnea on exertion      Ectasia, mammary duct     [facility] n[facility] discharge     Elevated fasting glucose      Gastro-oesophageal reflux disease      Granulomatosis with polyangiitis (H)      Hernia      History of angina      Hyperlipidemia LDL goal < 100      Hypertension goal BP (blood pressure) < 140/90     Essential hypertension     Iron deficiency anemia      Ischemic cardiomyopathy      Menorrhagia      Migraine headaches      Mild persistent asthma      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 Matias, neg w/u, Rx'd w/prednisone     Ventral hernia, unspecified, without mention of obstruction or gangrene 2012     Past Surgical History:   Procedure Laterality Date     C/SECTION, LOW TRANSVERSE  1996         CARDIAC SURGERY      cardiac stent- recent in 16; 4 other stents     COLONOSCOPY N/A 2023    Procedure: COLONOSCOPY, WITH POLYPECTOMY;  Surgeon: Percy Gross MD;  Location: [facility] OR      DILATION AND CURETTAGE N/A 07/06/2016    Procedure: DILATION AND CURETTAGE;  Surgeon: Nahed Butler MD;  [facility]     ENDOMETRIAL SAMPLING (BIOPSY) N/A 4/28/2023    Procedure: ENDOMETRIAL BIOPSY;  Surgeon: Nahed Butler MD;  [facility]     ESOPHAGOSCOPY, GASTROSCOPY, DUODENOSCOPY (EGD), COMBINED N/A 03/31/2022    Procedure: ESOPHAGOGASTRODUODENOSCOPY, WITH BIOPSY;  Surgeon: Enzo Sesay MD;  Location: [facility] GI     ESOPHAGOSCOPY, GASTROSCOPY, DUODENOSCOPY (EGD), COMBINED N/A 5/25/2023    Procedure: ESOPHAGOGASTRODUODENOSCOPY, WITH BIOPSY;  Surgeon: Percy Gross MD;  Location: [facility] OR     EXAM UNDER ANESTHESIA PELVIC N/A 4/28/2023    Procedure: EXAM UNDER ANESTHESIA;  Surgeon: Nahed Butler MD;  [facility] OR     HC UGI ENDOSCOPY W EUS  11/07/2008    Dr. Pastrana, possible chronic pancreatitis, fatty liver     HERNIA REPAIR  12/01/2012    [place] [facility] [place]     INSERT INTRAUTERINE DEVICE N/A 07/06/2016    Procedure: INSERT INTRAUTERINE DEVICE;  Surgeon: Nahed Butler MD;  [facility]     INT UTERINE FIBRIOD EMBOLIZATION  10/29/2014     LAPAROSCOPIC CHOLECYSTECTOMY  05/28/2008    Dr. Aronl GRUBBS TX, CERVICAL  1992    s/p LEEP, done at [facility] in [place].     ORBITOTOMY Right 03/15/2016    Procedure: ORBITOTOMY;  Surgeon: Myron Cyr MD;  Location: [facility]     ORBITOTOMY Right 08/04/2017    Procedure: ORBITOTOMY;  RIGHT ORBITOTOMY AND BIOPSY;  Surgeon: Charis Holbrook MD;  Location: [facility]     REMOVE INTRAUTERINE DEVICE N/A 4/28/2023    Procedure: Remove intrauterine device,;  Surgeon: Nahed Butler MD;  [facility]     REPAIR PTOSIS Right 11/17/2017    Procedure: REPAIR PTOSIS;  RIGHT UPPER LID PTOSIS REPAIR;  Surgeon: Myron Cyr MD;  Location: [facility]     UPPER GI ENDOSCOPY  10/21/2008    mild gastritis, Dr. Rocky [facility] ECHO HEART XTHORACIC,COMPLETE, W/O DOPPLER  02/04/2004    [facility] WNL, EF 60%      Family History   Problem Relation Age of Onset     Hypertension Mother      Arthritis Mother      Heart Disease Mother         long qt syndrome     Thyroid Disease Mother      C.A.D. Mother      Heart Disease Father 50        heart attack     Cerebrovascular Disease Father      Diabetes Father      Hypertension Father      Depression Father      C.A.D. Father      Neurologic Disorder Sister         migraines     Neurologic Disorder Sister         migraines     Heart Disease Brother 15        MI at 15, 16.      Arthritis Brother         he passed away, had severe arthritis at age 11     C.A.D. Brother      Anesthesia Reaction Son         PONV     Respiratory Son         asthma     Hypertension Maternal Aunt      Diabetes Maternal Uncle      Hypertension Maternal Uncle      Arthritis Maternal Uncle      Eye Disorder Maternal Uncle         cataracts     Cerebrovascular Disease Maternal Uncle      Family History Negative Other         neg for RA, SLE     Unknown/Adopted No family hx of         unknown neurological issues in her family, mother was adopted     Skin Cancer No family hx of         No known family hx of skin cancer     Deep Vein Thrombosis (DVT) No family hx of      Social History     Socioeconomic History     Marital status: Single     Spouse name: Not on file     Number of children: 1     Years of education: Not on file     Highest education level: Not on file   Occupational History     Employer: NONE    Tobacco Use     Smoking status: Former     Packs/day: 0.20     Years: 1.00     Pack years: 0.20     Types: Cigarettes     Quit date: 2016     Years since quittin.6     Smokeless tobacco: Never   Substance and Sexual Activity     Alcohol use: No     Alcohol/week: 0.0 standard drinks of alcohol     Drug use: No     Sexual activity: Not Currently   Other Topics Concern     Parent/sibling w/ CABG, MI or angioplasty before 65F 55M? Yes   Social History Narrative    Balanced Diet - sometimes     Osteoporosis Prevention Measures - Dairy servings per day: 2 servings weekly    Regular Exercise -  Yes Describe walking 4 times a week    Dental Exam - NO    Seatbelts used - Yes    Self Breast Exam - Yes    Abuse: Current or Past (Physical, Sexual or Emotional)- No    Do you have any concerns about STD's -  No    Do you feel safe in your environment - No    Guns stored in the home - No    Sunscreen used - Yes    Lipids -  YES - Date: 053102    Glucose -  YES - Date: 012804    Eye Exam - YES - Date: one year ago    Colon Cancer Screening - No    Hemoccults - NO    Pap Test -  YES - Date: 070904, remote history of LEEP    Mammography - YES - Date: last spring, would like to discuss, needs a referral to [facility]    DEXA - NO    Immunizations reviewed and up to date - Yes, last td given in 1997 or 1998     Social Determinants of Health     Financial Resource Strain: Not on file   Food Insecurity: Not on file   Transportation Needs: Not on file   Physical Activity: Not on file   Stress: Not on file   Social Connections: Not on file   Intimate Partner Violence: Not on file   Housing Stability: Not on file     Patient Active Problem List   Diagnosis     Seasonal affective disorder (H)     Allergic rhinitis     PCOS (polycystic ovarian syndrome)     Moderate persistent asthma     Chronic pancreatitis (H)     Hypertension goal BP (blood pressure) < 140/90     Common migraine     NSTEMI (non-ST elevated myocardial infarction) (H)     Hyperlipidemia LDL goal <70     ASCVD (arteriosclerotic cardiovascular disease)     GERD (gastroesophageal reflux disease)     Ischemic cardiomyopathy     Hypertensive heart disease     Uterine leiomyoma     Iron deficiency anemia     Costochondritis     Vitamin D deficiency     Breast cancer screening     Spinal stenosis in cervical region     Fibromyalgia     Seronegative rheumatoid arthritis (H)     Type 2 diabetes, HbA1C goal < 8% (H)     Type 2 diabetes mellitus with  other specified complication (H)     Hyperlipidemia associated with type 2 diabetes mellitus (H)     Hypertension associated with diabetes (H)     Overweight with body mass index (BMI) 25.0-29.9     Tobacco use disorder     Telogen effluvium     CAD S/P percutaneous coronary angioplasty     Status post coronary angiogram     ANCA-associated vasculitis (H)     Wegener's vasculitis     Type 1 diabetes mellitus with complications (H)     Chest discomfort     Urinary frequency     Abdominal pain, epigastric     Hypokalemia     Leukocytosis, unspecified type     Acute pancreatitis, unspecified complication status, unspecified pancreatitis type       Pregnancy Hx: She is . All miscarriages were in first trimester. H/o OC use in the past. 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    PMHx, FHx, SHx were reviewed, unchanged.    Outpatient Encounter Medications as of 2023   Medication Sig Dispense Refill     acetaminophen (TYLENOL) 325 MG tablet Take 1-2 tablets (325-650 mg) by mouth every 6 hours as needed for pain (headache) 250 tablet 4     ADVAIR DISKUS 250-50 MCG/ACT inhaler Inhale 1 puff into the lungs 2 times daily       albuterol (2.5 MG/3ML) 0.083% neb solution INL 1 VIAL VIA NEBULIZATION Q 4 TO 6 HOURS PRN  1     albuterol (PROAIR HFA, PROVENTIL HFA, VENTOLIN HFA) 108 (90 BASE) MCG/ACT inhaler Inhale 2 puffs into the lungs every 6 hours as needed for shortness of breath / dyspnea or wheezing 3 Inhaler 1     BANOPHEN 25 MG tablet Take 25 mg by mouth At Bedtime       blood glucose (NO BRAND SPECIFIED) lancets standard Use to test blood sugar 1-4 times daily or as directed. 400 each 3     blood glucose (NO BRAND SPECIFIED) test strip Use to test blood sugar 1-4 times daily or as directed. 400 strip 3     Blood Pressure Monitor KIT 1 each daily Monitor home blood pressure as instructed by physician.  Dispense [place] blood pressure kit. 1 kit 0     calcium carbonate (TUMS) 500 MG chewable tablet  Take 3-4 tablets (1,500-2,000 mg) by mouth daily as needed 90 tablet 3     clopidogrel (PLAVIX) 75 MG tablet Take 1 tablet (75 mg) by mouth daily . 90 tablet 3     clotrimazole (MYCELEX) 10 MG lozenge Place 1 lozenge (10 mg) inside cheek 5 times daily (Patient not taking: Reported on 8/1/2023) 50 lozenge 1     cyclobenzaprine (FLEXERIL) 10 MG tablet Take 1 tablet (10 mg) by mouth 2 times daily as needed for muscle spasms 60 tablet 3     dicyclomine (BENTYL) 20 MG tablet TAKE 1 TABLET(20 MG) BY MOUTH FOUR TIMES DAILY AS NEEDED. 60 tablet 0     empagliflozin (JARDIANCE) 10 MG TABS tablet Take 2 tablets (20 mg) by mouth daily (Patient taking differently: Take 20 mg by mouth At Bedtime) 90 tablet 3     EPIPEN 2-RIKY 0.3 MG/0.3ML injection INJECT 0.3 MG INTO THE MUSCLE PRF ANAPHYALAXIS  0     esomeprazole (NEXIUM) 40 MG DR capsule Take 1 capsule (40 mg) by mouth 2 times daily Take 30-60 minutes before eating. (Patient taking differently: Take 40 mg by mouth as needed Take 30-60 minutes before eating.) 180 capsule 0     estradiol (VAGIFEM) 10 MCG TABS vaginal tablet Place 1 tablet (10 mcg) vaginally twice a week (Patient not taking: Reported on 8/1/2023) 24 tablet 3     fluticasone (FLONASE) 50 MCG/ACT nasal spray Spray 1 spray in nostril as needed       folic acid (FOLVITE) 1 MG tablet Take 1 tablet (1 mg) by mouth daily (Patient taking differently: Take 1 mg by mouth At Bedtime) 90 tablet 3     hydrocortisone (CORTAID) 1 % external cream Apply topically 2 times daily (Patient taking differently: Apply topically as needed) 60 g 1     insulin glargine (LANTUS PEN) 100 UNIT/ML pen Inject 65 Units Subcutaneous every morning Or as directed. 75 mL 3     insulin pen needle (BD PEN NEEDLE MARCK 2ND GEN) 32G X 4 MM miscellaneous Use one pen needle daily or as directed. 100 each 3     ketoconazole (NIZORAL) 2 % shampoo Apply topically daily as needed (Patient not taking: Reported on 8/1/2023)       levocetirizine (XYZAL) 5 MG  tablet Take 5 mg by mouth as needed       lisinopril-hydrochlorothiazide (ZESTORETIC) 20-25 MG tablet Take 1 tablet by mouth daily 90 tablet 3     magnesium 250 MG tablet TAKE 1 TABLET(250 MG) BY MOUTH TWICE DAILY (Patient taking differently: Take 1 tablet by mouth At Bedtime) 60 tablet 3     metFORMIN (GLUCOPHAGE XR) 500 MG 24 hr tablet Take 4 tablets (2,000 mg) by mouth daily (with dinner) (Patient taking differently: Take 2,000 mg by mouth At Bedtime) 360 tablet 3     methylPREDNISolone (MEDROL DOSEPAK) 4 MG tablet therapy pack Follow Package Directions (Patient not taking: Reported on 8/1/2023) 21 tablet 0     metoprolol succinate ER (TOPROL XL) 100 MG 24 hr tablet Take 1 tablet (100 mg) by mouth daily 90 tablet 3     Multiple Vitamin (TAB-A-GERALD) TABS Take 1 tablet by mouth daily (Patient taking differently: Take 1 tablet by mouth At Bedtime) 90 tablet 1     nitroGLYcerin (NITROSTAT) 0.4 MG sublingual tablet Place 1 tablet (0.4 mg) under the tongue every 5 minutes as needed for chest pain . After 3 doses, if pain persists call 911. (Patient not taking: Reported on 8/1/2023) 25 tablet 3     olopatadine (PATANOL) 0.1 % ophthalmic solution Place 1 drop into both eyes as needed       pravastatin (PRAVACHOL) 40 MG tablet Take 1 tablet (40 mg) by mouth daily 90 tablet 3     prochlorperazine (COMPAZINE) 5 MG tablet Take 1 tablet (5 mg) by mouth every 6 hours as needed for nausea or vomiting 60 tablet 3     sennosides (SENOKOT) 8.6 MG tablet 1-2 tabs a day as needed for constipation 60 tablet 1     spironolactone (ALDACTONE) 25 MG tablet Take 1 tablet (25 mg) by mouth daily. May also take 0.5 tablets (12.5 mg) daily as needed (for weight gain). 45 tablet 9     sucralfate (CARAFATE) 1 GM tablet Take 1 tablet (1 g) by mouth 4 times daily 120 tablet 3     terbinafine (LAMISIL) 1 % external cream Apply topically 2 times daily (Patient taking differently: Apply topically as needed) 42 g 1     traMADol (ULTRAM) 50 MG tablet  "TAKE 1 TABLET(50 MG) BY MOUTH EVERY 8 HOURS AS NEEDED FOR SEVERE PAIN 60 tablet 3     vitamin D2 (ERGOCALCIFEROL) 09896 units (1250 mcg) capsule Take 1 capsule (50,000 Units) by mouth every 7 days (Patient not taking: Reported on 8/1/2023) 4 capsule 0     No facility-administered encounter medications on file as of 9/6/2023.       Allergies   Allergen Reactions     Amoxicillin-Pot Clavulanate      Unknown possible hives and edema     Amoxicillin-Pot Clavulanate      Artificial Sweetner (Informational Only)  Other (See Comments)     Increased headache     Azithromycin      Clavulanic Acid      Diatrizoate Other (See Comments)     Pt wants this listed because she is allergic to shellfish      Imitrex [Triptans]      Severe face/neck/chest tightness and flushing side effects      Penicillins Hives     Unknown      Pork Allergy      Stomach pain, cramping, diarrhea, itching, nausea and headaches     Shellfish Allergy Hives and Swelling     Shellfish-Derived Products      Sulfa Antibiotics Hives and Swelling     Sulfatolamide      Zithromax [Azithromycin Dihydrate] Swelling     Unknown        Ph.E:    /81   Pulse 84   Ht 1.575 m (5' 2\")   Wt 74.8 kg (165 lb)   SpO2 96%   BMI 30.18 kg/m      GA: NAD, pleasant  HEENT: nl conj, sclera, EOMI. No campos-orbital edema  Chest: CTAB  CV: no M/R/G, RRR  Abdomen: soft, NT  MSK: + active synovitis and joint tenderness over B/L 2nd/3rd MCPs  Skin: no rash  Neuro: non focal  Psych: nl affect    Assessment/Plan:    1-Seropositive non-erosive RA (arthritis, AM stiffness, high CRP, RF 26 but re-check neg 3/2015 on HCQ) Dx 5/2013, FMS, new pleuritic CP 3/20-15-5/2015 resolved on steroids. She is on  mg bid since 5/2014. She tolerates it well and it's helping some but not enough to control all her pain. Continued having flare ups of joint pain, could be GPA related. Some pain is due to FMS.My impression is that her arthralgias are a combination of RA/ IA sec GPA, " fibromyalgia and chronic pain.     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 Prednisone was not a good option for her given weight gain, diabetes. She tried and did not tolerate MTX, AZA.    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 Repeat MPO was positive in 6/2017.    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 Shah and Dr. Valdez at [facility]. 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     She is on rituximab since 12/12/2018 with great response, minor allergic reaction which could be managed by pre-meds and extra steroid dose. Developed high BG and vaginal yeast infections after solumedrol premed. Treated candida with fluconazole. Will decrease solumerdol dose to IV 80 mg prior to receiving rituximab. 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 However, feels Rituximab effects wear off around 4 months. According to ACR guidelines can give every 4-6 months. Pt elected to received this upcoming September which will be approximately 5 months from last dose. Repeat Orbit CT in September 2021 demonstrated improvement.     Last visit in April 2022-- Recommended marie given b cell depletion tx as rituximab blocks the response to covid vaccine, she is concerned about side effects. I advised her to discuss with [facility] pharmacist.      1/25/2023: Janine has been ill since Dx of COVID on 12/17/2022. Her most recent labs were reviewed, stable vasculitis labs in 8/2022 with CD19<1, neg vasculitis markers. In 1/2023, no anemia and nl thyroid function test. I ordered vit D as add on. 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 She does need to follow up on  abnormal thyroid US and mammogram as well. 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 I will see her in person, in early march to determine if it is ok to give another dose of rituxiamb. 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 Also ordered shower chair, commmandi per her request given significant fatigue, body pain.    3/9/2023: S/p last rituximab 1 gram on 9/7/2022. 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 Stable labs and eye inflammation.    2-Fat pads. She was seen by endocrinology and cushing was ruled out in 4/2014. Was advised to do f/u for enlarged thyroid/thyroid nodules.  3-Hair loss. Continue with dermatology  4-Chronic pain. F/U with pain clinic  5-Vit D deficiency. Was replaced with vit D 50, 000 units po qwk x 12 wk then 2000 units every day  6-?HCQ toxicity on OCT 7/2021, now off HCQ, could explain increased arthralgia  7- Chronic Headaches. Symptoms worsen after taking zofran. Has received compazine in hospital in the past without adverse effect. Will have patient trial Compazine       3/2023 plan:    Spine referral for abnormal C spine (DJD) in 2/2023.    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    Plan:      Rituximab 1 gram every 2 weeks x 2 this month, to be scheduled as soon as possible  (GPA/ANCA-vasculitis)    Labs with rituximab    Consider going back on plaquenil in future if needed    Try zanflex instead of flexeril    Refer to water aerobics and acupuncture    Return in about 3-4 months (In person)    Orders Placed This Encounter   Procedures     AST     ALT     Myeloperoxidase and Proteinase 3 Panel     Albumin level     Creatinine     CRP inflammation     UA with Microscopic reflex to Culture     Protein  random urine     Creatinine random urine     Erythrocyte sedimentation rate auto     CD19 B Cell Count     ANCA IgG by IFA with Reflex to Titer     Immunoglobulins A G and M     Acupuncture Referral     Physical Therapy Referral     CBC with Platelets & Differential       Majo Mckinnon MD

## 2023-09-06 NOTE — TELEPHONE ENCOUNTER
Updated Rituximab therapy plan orders entered per directionof provider.      Rituximab 1 gram every 2 weeks x 2 this month, to be scheduled as soon as possible (GPA/ANCA-vasculitis)          Krissy Taylor, BSN, RN  RN Care Coordinator Rheumatology

## 2023-09-07 ENCOUNTER — TELEPHONE (OUTPATIENT)
Dept: RHEUMATOLOGY | Facility: CLINIC | Age: 58
End: 2023-09-07
Payer: COMMERCIAL

## 2023-09-07 NOTE — TELEPHONE ENCOUNTER
Message left for patient that update received from [employer]-    We have verified the patient has active insurance and coverage of Rituximab(Truxima) has been secured. In regards to location, this plan allows Truxima at any of the [facility] infusion centers.    Left numbers to [facility] and [facility] infusion centers, along with cost of care estimate number and code for Rituximab.    Encourage patient to call this RN back with further questions.    ISHA PhillipsN, RN  RN Care Coordinator Rheumatology

## 2023-09-12 ENCOUNTER — TELEPHONE (OUTPATIENT)
Dept: FAMILY MEDICINE | Facility: CLINIC | Age: 58
End: 2023-09-12
Payer: COMMERCIAL

## 2023-09-18 NOTE — TELEPHONE ENCOUNTER
MEDICAL RECORDS REQUEST   [facility]  Urology Clinic  [address] [place] [address]  PHONE: [phone]  Fax: [phone]        FUTURE VISIT INFORMATION                                                   Janine Cornell, : 1965 scheduled for future visit at [facility]    APPOINTMENT INFORMATION:  Date: 10/31/2023  Provider:  Priyanka William PA-C  Reason for Visit/Diagnosis: Incontinence    REFERRAL INFORMATION:  Referring provider:  Kash Solano MD in [facility]      RECORDS REQUESTED FOR VISIT                                                     NOTES  STATUS/DETAILS   OFFICE NOTE from referring provider  yes, 2023 -- Kash Solano MD in [facility]   MEDICATION LIST  yes   LABS     URINALYSIS (UA)  yes   images  yes, 02/10/2023, 2022 -- CT ABD PELVIS     PRE-VISIT CHECKLIST      Joint diagnostic appointment coordinated correctly          (ensure right order & amount of time) Yes   RECORD COLLECTION COMPLETE Yes

## 2023-09-19 ENCOUNTER — INFUSION THERAPY VISIT (OUTPATIENT)
Dept: INFUSION THERAPY | Facility: CLINIC | Age: 58
End: 2023-09-19
Attending: INTERNAL MEDICINE
Payer: COMMERCIAL

## 2023-09-19 VITALS
HEART RATE: 84 BPM | TEMPERATURE: 98.2 F | BODY MASS INDEX: 30.4 KG/M2 | SYSTOLIC BLOOD PRESSURE: 134 MMHG | DIASTOLIC BLOOD PRESSURE: 80 MMHG | WEIGHT: 166.23 LBS

## 2023-09-19 DIAGNOSIS — M31.30 GRANULOMATOSIS WITH POLYANGIITIS WITHOUT RENAL INVOLVEMENT (H): Primary | ICD-10-CM

## 2023-09-19 DIAGNOSIS — I77.82 ANCA-ASSOCIATED VASCULITIS (H): ICD-10-CM

## 2023-09-19 LAB
ALBUMIN SERPL BCG-MCNC: 4.3 G/DL (ref 3.5–5.2)
ALT SERPL W P-5'-P-CCNC: 25 U/L (ref 0–50)
AST SERPL W P-5'-P-CCNC: 24 U/L (ref 0–45)
BASOPHILS # BLD AUTO: 0.1 10E3/UL (ref 0–0.2)
BASOPHILS NFR BLD AUTO: 1 %
CD19 B CELL COMMENT: ABNORMAL
CD19 CELLS # BLD: <1 CELLS/UL (ref 107–698)
CD19 CELLS NFR BLD: <1 % (ref 6–27)
CREAT SERPL-MCNC: 1.06 MG/DL (ref 0.51–0.95)
CRP SERPL-MCNC: 4.95 MG/L
EGFRCR SERPLBLD CKD-EPI 2021: 61 ML/MIN/1.73M2
EOSINOPHIL # BLD AUTO: 0.5 10E3/UL (ref 0–0.7)
EOSINOPHIL NFR BLD AUTO: 4 %
ERYTHROCYTE [DISTWIDTH] IN BLOOD BY AUTOMATED COUNT: 14.5 % (ref 10–15)
ERYTHROCYTE [SEDIMENTATION RATE] IN BLOOD BY WESTERGREN METHOD: 11 MM/HR (ref 0–30)
HCT VFR BLD AUTO: 40.3 % (ref 35–47)
HGB BLD-MCNC: 13.2 G/DL (ref 11.7–15.7)
IGA SERPL-MCNC: 173 MG/DL (ref 84–499)
IGG SERPL-MCNC: 565 MG/DL (ref 610–1616)
IGM SERPL-MCNC: 31 MG/DL (ref 35–242)
IMM GRANULOCYTES # BLD: 0 10E3/UL
IMM GRANULOCYTES NFR BLD: 0 %
LYMPHOCYTES # BLD AUTO: 3 10E3/UL (ref 0.8–5.3)
LYMPHOCYTES NFR BLD AUTO: 22 %
MCH RBC QN AUTO: 25.6 PG (ref 26.5–33)
MCHC RBC AUTO-ENTMCNC: 32.8 G/DL (ref 31.5–36.5)
MCV RBC AUTO: 78 FL (ref 78–100)
MONOCYTES # BLD AUTO: 0.9 10E3/UL (ref 0–1.3)
MONOCYTES NFR BLD AUTO: 6 %
NEUTROPHILS # BLD AUTO: 9.2 10E3/UL (ref 1.6–8.3)
NEUTROPHILS NFR BLD AUTO: 67 %
NRBC # BLD AUTO: 0 10E3/UL
NRBC BLD AUTO-RTO: 0 /100
PLATELET # BLD AUTO: 376 10E3/UL (ref 150–450)
RBC # BLD AUTO: 5.16 10E6/UL (ref 3.8–5.2)
WBC # BLD AUTO: 13.7 10E3/UL (ref 4–11)

## 2023-09-19 PROCEDURE — 86140 C-REACTIVE PROTEIN: CPT

## 2023-09-19 PROCEDURE — 84450 TRANSFERASE (AST) (SGOT): CPT

## 2023-09-19 PROCEDURE — 85652 RBC SED RATE AUTOMATED: CPT

## 2023-09-19 PROCEDURE — 83876 ASSAY MYELOPEROXIDASE: CPT

## 2023-09-19 PROCEDURE — 84460 ALANINE AMINO (ALT) (SGPT): CPT

## 2023-09-19 PROCEDURE — 96415 CHEMO IV INFUSION ADDL HR: CPT

## 2023-09-19 PROCEDURE — 85025 COMPLETE CBC W/AUTO DIFF WBC: CPT

## 2023-09-19 PROCEDURE — 96413 CHEMO IV INFUSION 1 HR: CPT

## 2023-09-19 PROCEDURE — 82040 ASSAY OF SERUM ALBUMIN: CPT

## 2023-09-19 PROCEDURE — 96375 TX/PRO/DX INJ NEW DRUG ADDON: CPT

## 2023-09-19 PROCEDURE — 82565 ASSAY OF CREATININE: CPT

## 2023-09-19 PROCEDURE — 86037 ANCA TITER EACH ANTIBODY: CPT

## 2023-09-19 PROCEDURE — 82784 ASSAY IGA/IGD/IGG/IGM EACH: CPT

## 2023-09-19 PROCEDURE — 250N000011 HC RX IP 250 OP 636: Mod: JZ | Performed by: INTERNAL MEDICINE

## 2023-09-19 PROCEDURE — 86355 B CELLS TOTAL COUNT: CPT

## 2023-09-19 PROCEDURE — 258N000003 HC RX IP 258 OP 636: Performed by: INTERNAL MEDICINE

## 2023-09-19 PROCEDURE — 96367 TX/PROPH/DG ADDL SEQ IV INF: CPT

## 2023-09-19 PROCEDURE — 36415 COLL VENOUS BLD VENIPUNCTURE: CPT

## 2023-09-19 PROCEDURE — 250N000013 HC RX MED GY IP 250 OP 250 PS 637: Performed by: INTERNAL MEDICINE

## 2023-09-19 RX ORDER — METHYLPREDNISOLONE SODIUM SUCCINATE 125 MG/2ML
81.25 INJECTION, POWDER, LYOPHILIZED, FOR SOLUTION INTRAMUSCULAR; INTRAVENOUS ONCE
Status: COMPLETED | OUTPATIENT
Start: 2023-09-19 | End: 2023-09-19

## 2023-09-19 RX ORDER — EPINEPHRINE 1 MG/ML
0.3 INJECTION, SOLUTION, CONCENTRATE INTRAVENOUS EVERY 5 MIN PRN
Status: CANCELLED | OUTPATIENT
Start: 2023-10-03

## 2023-09-19 RX ORDER — ACETAMINOPHEN 325 MG/1
650 TABLET ORAL ONCE
Status: COMPLETED | OUTPATIENT
Start: 2023-09-19 | End: 2023-09-19

## 2023-09-19 RX ORDER — MEPERIDINE HYDROCHLORIDE 25 MG/ML
25 INJECTION INTRAMUSCULAR; INTRAVENOUS; SUBCUTANEOUS EVERY 30 MIN PRN
Status: CANCELLED | OUTPATIENT
Start: 2023-10-03

## 2023-09-19 RX ORDER — HEPARIN SODIUM,PORCINE 10 UNIT/ML
5-20 VIAL (ML) INTRAVENOUS DAILY PRN
Status: CANCELLED | OUTPATIENT
Start: 2023-10-03

## 2023-09-19 RX ORDER — ALBUTEROL SULFATE 0.83 MG/ML
2.5 SOLUTION RESPIRATORY (INHALATION)
Status: CANCELLED | OUTPATIENT
Start: 2023-10-03

## 2023-09-19 RX ORDER — ACETAMINOPHEN 325 MG/1
650 TABLET ORAL ONCE
Status: CANCELLED
Start: 2023-10-03

## 2023-09-19 RX ORDER — METHYLPREDNISOLONE SODIUM SUCCINATE 125 MG/2ML
81.25 INJECTION, POWDER, LYOPHILIZED, FOR SOLUTION INTRAMUSCULAR; INTRAVENOUS ONCE
Status: CANCELLED | OUTPATIENT
Start: 2023-10-03

## 2023-09-19 RX ORDER — ALBUTEROL SULFATE 90 UG/1
1-2 AEROSOL, METERED RESPIRATORY (INHALATION)
Status: CANCELLED
Start: 2023-10-03

## 2023-09-19 RX ORDER — HEPARIN SODIUM (PORCINE) LOCK FLUSH IV SOLN 100 UNIT/ML 100 UNIT/ML
5 SOLUTION INTRAVENOUS
Status: CANCELLED | OUTPATIENT
Start: 2023-10-03

## 2023-09-19 RX ORDER — METHYLPREDNISOLONE SODIUM SUCCINATE 125 MG/2ML
125 INJECTION, POWDER, LYOPHILIZED, FOR SOLUTION INTRAMUSCULAR; INTRAVENOUS
Status: CANCELLED
Start: 2023-10-03

## 2023-09-19 RX ORDER — DIPHENHYDRAMINE HYDROCHLORIDE 50 MG/ML
50 INJECTION INTRAMUSCULAR; INTRAVENOUS
Status: CANCELLED
Start: 2023-10-03

## 2023-09-19 RX ADMIN — SODIUM CHLORIDE 250 ML: 9 INJECTION, SOLUTION INTRAVENOUS at 09:12

## 2023-09-19 RX ADMIN — ACETAMINOPHEN 650 MG: 325 TABLET, FILM COATED ORAL at 09:10

## 2023-09-19 RX ADMIN — RITUXIMAB-ABBS 1000 MG: 10 INJECTION, SOLUTION INTRAVENOUS at 10:10

## 2023-09-19 RX ADMIN — METHYLPREDNISOLONE SODIUM SUCCINATE 81.25 MG: 125 INJECTION INTRAMUSCULAR; INTRAVENOUS at 09:18

## 2023-09-19 RX ADMIN — DIPHENHYDRAMINE HYDROCHLORIDE 50 MG: 50 INJECTION, SOLUTION INTRAMUSCULAR; INTRAVENOUS at 09:35

## 2023-09-19 ASSESSMENT — PAIN SCALES - GENERAL: PAINLEVEL: MODERATE PAIN (5)

## 2023-09-19 NOTE — LETTER
September 26, 2023      Janineyael Cornell  [address] [facility] [address]        Dear ,    We are writing to inform you of your test results.    CRP inflammation is back to normal.    Resulted Orders   ANCA IgG by IFA with Reflex to Titer   Result Value Ref Range    Neutrophil Cytoplasmic Antibody <1:10 <1:10    Neutrophil Cytoplasmic Antibody Pattern       The ANCA IFA is <1:10.  No further testing will be performed.   Immunoglobulins A G and M   Result Value Ref Range    Immunoglobulin G 565 (L) 610 - 1,616 mg/dL    Immunoglobulin A 173 84 - 499 mg/dL    Immunoglobulin M 31 (L) 35 - 242 mg/dL   Myeloperoxidase and Proteinase 3 Panel   Result Value Ref Range    MPO Marline IgG Instrument Value 0.7 <3.5 U/mL    Myeloperoxidase Antibody IgG Negative Negative    Proteinase 3 Marline IgG Instrument Value <1.0 <2.0 U/mL    Proteinase 3 Antibody IgG Negative Negative   Creatinine   Result Value Ref Range    Creatinine 1.06 (H) 0.51 - 0.95 mg/dL    GFR Estimate 61 >60 mL/min/1.73m2   CRP inflammation   Result Value Ref Range    CRP Inflammation 4.95 <5.00 mg/L   AST   Result Value Ref Range    AST 24 0 - 45 U/L      Comment:      Reference intervals for this test were updated on 6/12/2023 to more accurately reflect our healthy population. There may be differences in the flagging of prior results with similar values performed with this method. Interpretation of those prior results can be made in the context of the updated reference intervals.   ALT   Result Value Ref Range    ALT 25 0 - 50 U/L      Comment:      Reference intervals for this test were updated on 6/12/2023 to more accurately reflect our healthy population. There may be differences in the flagging of prior results with similar values performed with this method. Interpretation of those prior results can be made in the context of the updated reference intervals.     Albumin level   Result Value Ref Range    Albumin 4.3 3.5 - 5.2 g/dL   CD19 B Cell Count    Result Value Ref Range    CD19% B Cells <1 (L) 6 - 27 %    Absolute CD19, B Cells <1 (L) 107 - 698 cells/uL    CD19 B Cell Comment     Erythrocyte sedimentation rate auto   Result Value Ref Range    Erythrocyte Sedimentation Rate 11 0 - 30 mm/hr   CBC with platelets and differential   Result Value Ref Range    WBC Count 13.7 (H) 4.0 - 11.0 10e3/uL    RBC Count 5.16 3.80 - 5.20 10e6/uL    Hemoglobin 13.2 11.7 - 15.7 g/dL    Hematocrit 40.3 35.0 - 47.0 %    MCV 78 78 - 100 fL    MCH 25.6 (L) 26.5 - 33.0 pg    MCHC 32.8 31.5 - 36.5 g/dL    RDW 14.5 10.0 - 15.0 %    Platelet Count 376 150 - 450 10e3/uL    % Neutrophils 67 %    % Lymphocytes 22 %    % Monocytes 6 %    % Eosinophils 4 %    % Basophils 1 %    % Immature Granulocytes 0 %    NRBCs per 100 WBC 0 <1 /100    Absolute Neutrophils 9.2 (H) 1.6 - 8.3 10e3/uL    Absolute Lymphocytes 3.0 0.8 - 5.3 10e3/uL    Absolute Monocytes 0.9 0.0 - 1.3 10e3/uL    Absolute Eosinophils 0.5 0.0 - 0.7 10e3/uL    Absolute Basophils 0.1 0.0 - 0.2 10e3/uL    Absolute Immature Granulocytes 0.0 <=0.4 10e3/uL    Absolute NRBCs 0.0 10e3/uL       If you have any questions or concerns, please call the clinic at the number listed above.       Sincerely,      Majo Mckinnon MD

## 2023-09-19 NOTE — PROGRESS NOTES
Infusion Nursing Note:  Janine CHELLE Whitmoreey presents today for Rituximab.    Patient seen by provider today: No   present during visit today: Not Applicable.    Note: N/A.      Intravenous Access:  Labs drawn without difficulty.  Peripheral IV placed.    Treatment Conditions:  Biological Infusion Checklist:  ~~~ NOTE: If the patient answers yes to any of the questions below, hold the infusion and contact ordering provider or on-call provider.    Have you recently had an elevated temperature, fever, chills, productive cough, coughing for 3 weeks or longer or hemoptysis,  abnormal vital signs, night sweats,  chest pain or have you noticed a decrease in your appetite, unexplained weight loss or fatigue? No  Do you have any open wounds or new incisions? No  Do you have any upcoming hospitalizations or surgeries? Does not include esophagogastroduodenoscopy, colonoscopy, endoscopic retrograde cholangiopancreatography (ERCP), endoscopic ultrasound (EUS), dental procedures or joint aspiration/steroid injections No  Do you currently have any signs of illness or infection or are you on any antibiotics? No  Have you had any new, sudden or worsening abdominal pain? No  Have you or anyone in your household received a live vaccination in the past 4 weeks? Please note: No live vaccines while on biologic/chemotherapy until 6 months after the last treatment. Patient can receive the flu vaccine (shot only), pneumovax and the Covid vaccine. It is optimal for the patient to get these vaccines mid cycle, but they can be given at any time as long as it is not on the day of the infusion. No  Have you recently been diagnosed with any new nervous system diseases (ie. Multiple sclerosis, Guillain [place], seizures, neurological changes) or cancer diagnosis? Are you on any form of radiation or chemotherapy? No  Are you pregnant or breast feeding or do you have plans of pregnancy in the future? No  Have you been having any signs of  worsening depression or suicidal ideations?  (benlysta only) No  Have there been any other new onset medical symptoms? No  Have you had any new blood clots? (IVIG only) No      Post Infusion Assessment:  Patient tolerated infusion poorly due to :  complained that nurses were speaking too loudly at the desk.  Stated she felt like leaving.  This nurse apologized, stated that we would attempt to lower our volumes.  She agreed to this.   By the end of the infusion, she was calmer and glad she had completed the infusion.  Rituxan started at 50 ml/hr and increased q 30 minutes by 50 to max rate of 200ml/hr as this is what she seems to tolerate.  Blood return noted pre and post infusion.  Site patent and intact, free from redness, edema or discomfort.  No evidence of extravasations.  Access discontinued per protocol.  Biologic Infusion Post Education: Call the triage nurse at your clinic or seek medical attention if you have chills and/or temperature greater than or equal to 100.5, uncontrolled nausea/vomiting, diarrhea, constipation, dizziness, shortness of breath, chest pain, heart palpitations, weakness or any other new or concerning symptoms, questions or concerns.  You cannot have any live virus vaccines prior to or during treatment or up to 6 months post infusion.  If you have an upcoming surgery, medical procedure or dental procedure during treatment, this should be discussed with your ordering physician and your surgeon/dentist.  If you are having any concerning symptom, if you are unsure if you should get your next infusion or wish to speak to a provider before your next infusion, please call your care coordinator or triage nurse at your clinic to notify them so we can adequately serve you.  .       Discharge Plan:   Copy of AVS reviewed with patient and/or family.  Patient will return 10/3/23   for next appointment.  Patient discharged in stable condition accompanied by: self.  Departure Mode:  Ambulatory.  .      Andra Cornejo

## 2023-09-20 LAB
ANCA AB PATTERN SER IF-IMP: NORMAL
C-ANCA TITR SER IF: NORMAL {TITER}

## 2023-09-27 ENCOUNTER — TELEPHONE (OUTPATIENT)
Dept: RHEUMATOLOGY | Facility: CLINIC | Age: 58
End: 2023-09-27
Payer: COMMERCIAL

## 2023-09-27 NOTE — TELEPHONE ENCOUNTER
Left Voicemail (1st Attempt) for the patient to call back and schedule the following:    Appointment type: 3-4 month follow up  Provider: Dr. Mckinnon  Return date: 12/6/2023  Specialty phone number: [phone]  Additional appointment(s) needed: NA  Additonal Notes: NA

## 2023-10-03 ENCOUNTER — INFUSION THERAPY VISIT (OUTPATIENT)
Dept: INFUSION THERAPY | Facility: CLINIC | Age: 58
End: 2023-10-03
Attending: INTERNAL MEDICINE
Payer: COMMERCIAL

## 2023-10-03 VITALS
RESPIRATION RATE: 16 BRPM | HEART RATE: 72 BPM | TEMPERATURE: 98.1 F | OXYGEN SATURATION: 98 % | DIASTOLIC BLOOD PRESSURE: 83 MMHG | SYSTOLIC BLOOD PRESSURE: 134 MMHG

## 2023-10-03 DIAGNOSIS — I77.82 ANCA-ASSOCIATED VASCULITIS (H): ICD-10-CM

## 2023-10-03 DIAGNOSIS — M31.30 GRANULOMATOSIS WITH POLYANGIITIS WITHOUT RENAL INVOLVEMENT (H): Primary | ICD-10-CM

## 2023-10-03 PROCEDURE — 258N000003 HC RX IP 258 OP 636: Performed by: INTERNAL MEDICINE

## 2023-10-03 PROCEDURE — 250N000011 HC RX IP 250 OP 636: Mod: JZ | Performed by: INTERNAL MEDICINE

## 2023-10-03 PROCEDURE — 96375 TX/PRO/DX INJ NEW DRUG ADDON: CPT

## 2023-10-03 PROCEDURE — 250N000013 HC RX MED GY IP 250 OP 250 PS 637: Performed by: INTERNAL MEDICINE

## 2023-10-03 PROCEDURE — 96367 TX/PROPH/DG ADDL SEQ IV INF: CPT

## 2023-10-03 PROCEDURE — 96413 CHEMO IV INFUSION 1 HR: CPT

## 2023-10-03 PROCEDURE — 96415 CHEMO IV INFUSION ADDL HR: CPT

## 2023-10-03 RX ORDER — HEPARIN SODIUM,PORCINE 10 UNIT/ML
5-20 VIAL (ML) INTRAVENOUS DAILY PRN
Status: CANCELLED | OUTPATIENT
Start: 2023-10-03

## 2023-10-03 RX ORDER — HEPARIN SODIUM (PORCINE) LOCK FLUSH IV SOLN 100 UNIT/ML 100 UNIT/ML
5 SOLUTION INTRAVENOUS
Status: CANCELLED | OUTPATIENT
Start: 2023-10-03

## 2023-10-03 RX ORDER — EPINEPHRINE 1 MG/ML
0.3 INJECTION, SOLUTION, CONCENTRATE INTRAVENOUS EVERY 5 MIN PRN
Status: CANCELLED | OUTPATIENT
Start: 2023-10-03

## 2023-10-03 RX ORDER — METHYLPREDNISOLONE SODIUM SUCCINATE 125 MG/2ML
125 INJECTION, POWDER, LYOPHILIZED, FOR SOLUTION INTRAMUSCULAR; INTRAVENOUS
Status: CANCELLED
Start: 2023-10-03

## 2023-10-03 RX ORDER — DIPHENHYDRAMINE HYDROCHLORIDE 50 MG/ML
50 INJECTION INTRAMUSCULAR; INTRAVENOUS
Status: CANCELLED
Start: 2023-10-03

## 2023-10-03 RX ORDER — METHYLPREDNISOLONE SODIUM SUCCINATE 125 MG/2ML
81.25 INJECTION, POWDER, LYOPHILIZED, FOR SOLUTION INTRAMUSCULAR; INTRAVENOUS ONCE
Status: CANCELLED | OUTPATIENT
Start: 2023-10-03

## 2023-10-03 RX ORDER — ACETAMINOPHEN 325 MG/1
650 TABLET ORAL ONCE
Status: CANCELLED
Start: 2023-10-03

## 2023-10-03 RX ORDER — ALBUTEROL SULFATE 0.83 MG/ML
2.5 SOLUTION RESPIRATORY (INHALATION)
Status: CANCELLED | OUTPATIENT
Start: 2023-10-03

## 2023-10-03 RX ORDER — MEPERIDINE HYDROCHLORIDE 25 MG/ML
25 INJECTION INTRAMUSCULAR; INTRAVENOUS; SUBCUTANEOUS EVERY 30 MIN PRN
Status: CANCELLED | OUTPATIENT
Start: 2023-10-03

## 2023-10-03 RX ORDER — ALBUTEROL SULFATE 90 UG/1
1-2 AEROSOL, METERED RESPIRATORY (INHALATION)
Status: CANCELLED
Start: 2023-10-03

## 2023-10-03 RX ORDER — METHYLPREDNISOLONE SODIUM SUCCINATE 125 MG/2ML
81.25 INJECTION, POWDER, LYOPHILIZED, FOR SOLUTION INTRAMUSCULAR; INTRAVENOUS ONCE
Status: COMPLETED | OUTPATIENT
Start: 2023-10-03 | End: 2023-10-03

## 2023-10-03 RX ORDER — ACETAMINOPHEN 325 MG/1
650 TABLET ORAL ONCE
Status: COMPLETED | OUTPATIENT
Start: 2023-10-03 | End: 2023-10-03

## 2023-10-03 RX ADMIN — RITUXIMAB-ABBS 1000 MG: 10 INJECTION, SOLUTION INTRAVENOUS at 09:21

## 2023-10-03 RX ADMIN — SODIUM CHLORIDE 250 ML: 9 INJECTION, SOLUTION INTRAVENOUS at 08:49

## 2023-10-03 RX ADMIN — DIPHENHYDRAMINE HYDROCHLORIDE 50 MG: 50 INJECTION, SOLUTION INTRAMUSCULAR; INTRAVENOUS at 08:47

## 2023-10-03 RX ADMIN — ACETAMINOPHEN 650 MG: 325 TABLET, FILM COATED ORAL at 08:30

## 2023-10-03 RX ADMIN — METHYLPREDNISOLONE SODIUM SUCCINATE 81.25 MG: 125 INJECTION, POWDER, FOR SOLUTION INTRAMUSCULAR; INTRAVENOUS at 08:39

## 2023-10-03 NOTE — PROGRESS NOTES
Infusion Nursing Note:  Janineyael Cornell presents today for Rituximab.    Patient seen by provider today: No   present during visit today: Not Applicable.    Note: Tylenol given PO.  Benadryl given IV piggyback.  Infusion started at 50 ml/hr and increased 50 ml/hr every 30 min to a max rate of 250 ml/hr.      Intravenous Access:  Peripheral IV placed.    Treatment Conditions:  Biological Infusion Checklist:  ~~~ NOTE: If the patient answers yes to any of the questions below, hold the infusion and contact ordering provider or on-call provider.    Have you recently had an elevated temperature, fever, chills, productive cough, coughing for 3 weeks or longer or hemoptysis,  abnormal vital signs, night sweats,  chest pain or have you noticed a decrease in your appetite, unexplained weight loss or fatigue? No  Do you have any open wounds or new incisions? No  Do you have any upcoming hospitalizations or surgeries? Does not include esophagogastroduodenoscopy, colonoscopy, endoscopic retrograde cholangiopancreatography (ERCP), endoscopic ultrasound (EUS), dental procedures or joint aspiration/steroid injections No  Do you currently have any signs of illness or infection or are you on any antibiotics? No  Have you had any new, sudden or worsening abdominal pain? No  Have you or anyone in your household received a live vaccination in the past 4 weeks? Please note: No live vaccines while on biologic/chemotherapy until 6 months after the last treatment. Patient can receive the flu vaccine (shot only), pneumovax and the Covid vaccine. It is optimal for the patient to get these vaccines mid cycle, but they can be given at any time as long as it is not on the day of the infusion. No  Have you recently been diagnosed with any new nervous system diseases (ie. Multiple sclerosis, Guillain [place], seizures, neurological changes) or cancer diagnosis? Are you on any form of radiation or chemotherapy? No  Are you pregnant or  breast feeding or do you have plans of pregnancy in the future? No  Have you been having any signs of worsening depression or suicidal ideations?  (benlysta only) No  Have there been any other new onset medical symptoms? No  Have you had any new blood clots? (IVIG only) No      Post Infusion Assessment:  Patient tolerated infusion without incident.  Blood return noted pre and post infusion.  Site patent and intact, free from redness, edema or discomfort.  No evidence of extravasations.  Access discontinued per protocol.  Biologic Infusion Post Education: Call the triage nurse at your clinic or seek medical attention if you have chills and/or temperature greater than or equal to 100.5, uncontrolled nausea/vomiting, diarrhea, constipation, dizziness, shortness of breath, chest pain, heart palpitations, weakness or any other new or concerning symptoms, questions or concerns.  You cannot have any live virus vaccines prior to or during treatment or up to 6 months post infusion.  If you have an upcoming surgery, medical procedure or dental procedure during treatment, this should be discussed with your ordering physician and your surgeon/dentist.  If you are having any concerning symptom, if you are unsure if you should get your next infusion or wish to speak to a provider before your next infusion, please call your care coordinator or triage nurse at your clinic to notify them so we can adequately serve you.       Discharge Plan:   Patient and/or family verbalized understanding of discharge instructions and all questions answered.  Patient discharged in stable condition accompanied by: self.      FAVIO DOYLE RN

## 2023-10-05 ENCOUNTER — TELEPHONE (OUTPATIENT)
Dept: RHEUMATOLOGY | Facility: CLINIC | Age: 58
End: 2023-10-05
Payer: COMMERCIAL

## 2023-10-05 NOTE — TELEPHONE ENCOUNTER
Patient Contacted for the patient to call back and schedule the following:    Appointment type: 4 month follow up  Provider: Dr Mckinnon  Return date: February 2024  Specialty phone number: [phone]  Additional appointment(s) needed: NA  Additonal Notes: Called to schedule appt. Next available isn't until March. Pt requested sooner appt. Offered to put on wait list which she declined, saying she wanted to be seen in January-February at the 4 month time. Sending message to provider and nursing pool to see if scheduling earlier is possible.

## 2023-10-10 ENCOUNTER — TELEPHONE (OUTPATIENT)
Dept: NEUROLOGY | Facility: CLINIC | Age: 58
End: 2023-10-10

## 2023-10-10 NOTE — TELEPHONE ENCOUNTER
Spoke with the pt, also called back and left VM, pt will need to resched their Jan 29, 2024 appt with Dr. Rodriguez.    Per Ankita JACQUES, ok to sched [facility] New virtual.    10/10/23 LILY

## 2023-10-11 ENCOUNTER — TELEPHONE (OUTPATIENT)
Dept: RHEUMATOLOGY | Facility: CLINIC | Age: 58
End: 2023-10-11
Payer: COMMERCIAL

## 2023-10-11 NOTE — TELEPHONE ENCOUNTER
Patient Contacted for the patient to call back and schedule the following:    Appointment type: 3-4 month return visit  Provider: Dr. Mckinnon  Return date: 1/31/2024  Specialty phone number: [phone]  Additional appointment(s) needed: NA  Additonal Notes: NA

## 2023-10-16 ENCOUNTER — TELEPHONE (OUTPATIENT)
Dept: RHEUMATOLOGY | Facility: CLINIC | Age: 58
End: 2023-10-16
Payer: COMMERCIAL

## 2023-10-16 NOTE — TELEPHONE ENCOUNTER
[facility] is calling hoping to get the referral before patients appointment on Wed morning.  Fax: [phone] Ph: [phone]

## 2023-10-16 NOTE — TELEPHONE ENCOUNTER
[facility] Call Center    Phone Message    May a detailed message be left on voicemail: yes     Reason for Call: Physical therapy referral needs to be sent over to [phone] PH: [phone]    Action Taken: Other: Rheum    Travel Screening: Not Applicable

## 2023-10-17 ENCOUNTER — TELEPHONE (OUTPATIENT)
Dept: NEUROLOGY | Facility: CLINIC | Age: 58
End: 2023-10-17
Payer: COMMERCIAL

## 2023-10-17 NOTE — TELEPHONE ENCOUNTER
I spoke with the pt, they were on the resched list for Dr. Rodriguez.    The pt stated that if they can not be seen before the end of the year, they did not want to resched.     I looked at other location and other providers, there was nothing to offer at the time     10/17/23 BD

## 2023-10-18 ENCOUNTER — OFFICE VISIT (OUTPATIENT)
Dept: ENDOCRINOLOGY | Facility: CLINIC | Age: 58
End: 2023-10-18
Payer: COMMERCIAL

## 2023-10-18 VITALS
HEART RATE: 75 BPM | WEIGHT: 167 LBS | DIASTOLIC BLOOD PRESSURE: 85 MMHG | SYSTOLIC BLOOD PRESSURE: 128 MMHG | BODY MASS INDEX: 30.54 KG/M2 | OXYGEN SATURATION: 96 %

## 2023-10-18 DIAGNOSIS — E11.9 TYPE 2 DIABETES, HBA1C GOAL < 7% (H): ICD-10-CM

## 2023-10-18 DIAGNOSIS — E11.9 TYPE 2 DIABETES, HBA1C GOAL < 8% (H): ICD-10-CM

## 2023-10-18 DIAGNOSIS — E11.59 TYPE 2 DIABETES MELLITUS WITH OTHER CIRCULATORY COMPLICATION, WITH LONG-TERM CURRENT USE OF INSULIN (H): Primary | ICD-10-CM

## 2023-10-18 DIAGNOSIS — Z79.4 TYPE 2 DIABETES MELLITUS WITH OTHER CIRCULATORY COMPLICATION, WITH LONG-TERM CURRENT USE OF INSULIN (H): Primary | ICD-10-CM

## 2023-10-18 LAB — HBA1C MFR BLD: 8.3 % (ref 4.3–?)

## 2023-10-18 PROCEDURE — 99215 OFFICE O/P EST HI 40 MIN: CPT | Performed by: STUDENT IN AN ORGANIZED HEALTH CARE EDUCATION/TRAINING PROGRAM

## 2023-10-18 PROCEDURE — 83036 HEMOGLOBIN GLYCOSYLATED A1C: CPT | Performed by: PATHOLOGY

## 2023-10-18 PROCEDURE — 83036 HEMOGLOBIN GLYCOSYLATED A1C: CPT | Performed by: STUDENT IN AN ORGANIZED HEALTH CARE EDUCATION/TRAINING PROGRAM

## 2023-10-18 RX ORDER — METFORMIN HCL 500 MG
2000 TABLET, EXTENDED RELEASE 24 HR ORAL
Qty: 360 TABLET | Refills: 3 | Status: SHIPPED | OUTPATIENT
Start: 2023-10-18 | End: 2024-05-01

## 2023-10-18 ASSESSMENT — PAIN SCALES - GENERAL: PAINLEVEL: SEVERE PAIN (6)

## 2023-10-18 NOTE — NURSING NOTE
Chief Complaint   Patient presents with    Endocrine Problem     Thyroid and diabetes. Did not bring meter today and does not have readings with her.      Blood pressure 128/85, pulse 75, weight 75.8 kg (167 lb), SpO2 96%, not currently breastfeeding.    Noemí Mendoza
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
## 2023-10-19 ENCOUNTER — PRE VISIT (OUTPATIENT)
Dept: UROLOGY | Facility: CLINIC | Age: 58
End: 2023-10-19
Payer: COMMERCIAL

## 2023-10-19 LAB — HBA1C MFR BLD: 8.3 %

## 2023-10-30 ENCOUNTER — TELEPHONE (OUTPATIENT)
Dept: UROLOGY | Facility: CLINIC | Age: 58
End: 2023-10-30
Payer: COMMERCIAL

## 2023-10-30 NOTE — PROGRESS NOTES
Name: Janine Cornell    MRN: [id number]   YOB: 1965                 Chief Complaint:   Urinary incontinence         Assessment and Plan:   58 year old female with mixed urinary incontinence. We discussed the different types of urinary incontinence and the varying treatment options for each. Also discussed the importance of tight glycemic control in managing her urinary symptoms. Her Jardiance medication may be contributing to irritative symptoms and increasing her risk for UTIs. We further discussed management options to include lifestyle modifications, bladder retraining, Kegel exercises, pelvic floor physical therapy, medications for OAB, surgery for stress incontinence, vs other. Strongly encouraged her to consider pelvic floor physical therapy but she declines a referral. Janine would like to proceed as follows:  -Bladder retraining exercises.  -Limit bladder irritants.   -Kegel exercises.  -Follow up in 2-3 months to recheck.     Priyanka William PA-C  October 30, 2023          History of Present Illness:   Janine Cornell is a 58 year old female with PMH of DM, HTN, HLD, CAD, among others seen today via virtual visit in consultation from Dr. Solano for evaluation of urinary incontinence. She reports a 1.5 year history of urinary incontinence with coughing, sneezing, strong urge, and sometimes without any warning. Incontinence is usually small volume. She wears panty liners and goes through 1-2 per day.  Feels that her incontinence worsens when her sciatica and back pain flares. She is not sure that she always completely empties her bladder. Nocturia x 0-1.    Over the last few years, she has had an increase in UTIs as well. Symptoms include dysuria, frequency, urgency, and she has had gross hematuria associated with infections in the past (never in the absence of infections). Her last UTI was Feb 2023. She denies UTI symptoms currently.          Past Medical History:     Past Medical History:    Diagnosis Date    Abnormal glandular Papanicolaou smear of cervix     Allergic rhinitis     Allergy, airborne subst    Arthritis     ASCVD (arteriosclerotic cardiovascular disease)     Chronic pain     Chronic pancreatitis (H)     idiopathic, Tx: PPI, antioxidants, pancreatic enzymes    Common migraine     Coronary artery disease     Costochondritis     Difficulty in walking(719.7)     Dyspnea on exertion     Ectasia, mammary duct     [facility], [facility] nipple discharge    Elevated fasting glucose     Gastro-oesophageal reflux disease     Granulomatosis with polyangiitis (H)     Hernia     History of angina     Hyperlipidemia LDL goal < 100     Hypertension goal BP (blood pressure) < 140/90     Essential hypertension    Iron deficiency anemia     Ischemic cardiomyopathy     Menorrhagia     Migraine headaches     Mild persistent asthma     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 Matias, neg w/u, Rx'd w/prednisone    Ventral hernia, unspecified, without mention of obstruction or gangrene 2012            Past Surgical History:     Past Surgical History:   Procedure Laterality Date    C/SECTION, LOW TRANSVERSE  1996        CARDIAC SURGERY      cardiac stent- recent in 16; 4 other stents    COLONOSCOPY N/A 2023    Procedure:  COLONOSCOPY, WITH POLYPECTOMY;  Surgeon: Percy Gross MD;  Location: [facility] OR    DILATION AND CURETTAGE N/A 07/06/2016    Procedure: DILATION AND CURETTAGE;  Surgeon: Nahed Butler MD;  [facility]    ENDOMETRIAL SAMPLING (BIOPSY) N/A 4/28/2023    Procedure: ENDOMETRIAL BIOPSY;  Surgeon: Nahed Butler MD;  [facility]    ESOPHAGOSCOPY, GASTROSCOPY, DUODENOSCOPY (EGD), COMBINED N/A 03/31/2022    Procedure: ESOPHAGOGASTRODUODENOSCOPY, WITH BIOPSY;  Surgeon: Enzo Sesay MD;  Location: [facility]    ESOPHAGOSCOPY, GASTROSCOPY, DUODENOSCOPY (EGD), COMBINED N/A 5/25/2023    Procedure: ESOPHAGOGASTRODUODENOSCOPY, WITH BIOPSY;  Surgeon: Percy Gross MD;  Location: [facility] OR    EXAM UNDER ANESTHESIA PELVIC N/A 4/28/2023    Procedure: EXAM UNDER ANESTHESIA;  Surgeon: Nahed Butler MD;  [facility]    HC UGI ENDOSCOPY W EUS  11/07/2008    Dr. Pastrana, possible chronic pancreatitis, fatty liver    HERNIA REPAIR  12/01/2012    done at [facility] [place]    INSERT INTRAUTERINE DEVICE N/A 07/06/2016    Procedure: INSERT INTRAUTERINE DEVICE;  Surgeon: Nahed Butler MD;  [facility]    INT UTERINE FIBRIOD EMBOLIZATION  10/29/2014    LAPAROSCOPIC CHOLECYSTECTOMY  05/28/2008    Dr. Arnol GRUBBS TX, CERVICAL  1992    s/p LEEP, done at [facility] in [place].    ORBITOTOMY Right 03/15/2016    Procedure: ORBITOTOMY;  Surgeon: Myron Cyr MD;  Location: [facility]    ORBITOTOMY Right 08/04/2017    Procedure: ORBITOTOMY;  RIGHT ORBITOTOMY AND BIOPSY;  Surgeon: Charis Holbrook MD;  Location: [facility]    REMOVE INTRAUTERINE DEVICE N/A 4/28/2023    Procedure: Remove intrauterine device,;  Surgeon: Nahed Butler MD;  [facility]    REPAIR PTOSIS Right 11/17/2017    Procedure: REPAIR PTOSIS;  RIGHT UPPER LID PTOSIS REPAIR;  Surgeon: Myron Cyr MD;  Location: [facility]    UPPER GI ENDOSCOPY  10/21/2008    mild gastritis, Dr. Rocky [facility]  XTHORACIC,COMPLETE, W/O DOPPLER  2004    [facility] WNL, EF 60%            Social History:     Social History     Tobacco Use    Smoking status: Former     Packs/day: 0.20     Years: 1.00     Additional pack years: 0.00     Total pack years: 0.20     Types: Cigarettes     Quit date: 2016     Years since quittin.7    Smokeless tobacco: Never   Substance Use Topics    Alcohol use: No     Alcohol/week: 0.0 standard drinks of alcohol            Family History:     Family History   Problem Relation Age of Onset    Hypertension Mother     Arthritis Mother     Heart Disease Mother         long qt syndrome    Thyroid Disease Mother     C.A.D. Mother     Heart Disease Father 50        heart attack    Cerebrovascular Disease Father     Diabetes Father     Hypertension Father     Depression Father     C.A.D. Father     Neurologic Disorder Sister         migraines    Neurologic Disorder Sister         migraines    Heart Disease Brother 15        MI at 15, 16.     Arthritis Brother         he passed away, had severe arthritis at age 11    C.A.D. Brother     Anesthesia Reaction Son         PONV    Respiratory Son         asthma    Hypertension Maternal Aunt     Diabetes Maternal Uncle     Hypertension Maternal Uncle     Arthritis Maternal Uncle     Eye Disorder Maternal Uncle         cataracts    Cerebrovascular Disease Maternal Uncle     Family History Negative Other         neg for RA, SLE    Unknown/Adopted No family hx of         unknown neurological issues in her family, mother was adopted    Skin Cancer No family hx of         No known family hx of skin cancer    Deep Vein Thrombosis (DVT) No family hx of             Allergies:     Allergies   Allergen Reactions    Amoxicillin-Pot Clavulanate      Unknown possible hives and edema    Amoxicillin-Pot Clavulanate     Artificial Sweetner (Informational Only)  Other (See Comments)     Increased headache    Azithromycin     Clavulanic Acid      Diatrizoate Other (See Comments)     Pt wants this listed because she is allergic to shellfish     Imitrex [Triptans]      Severe face/neck/chest tightness and flushing side effects     Penicillins Hives     Unknown     Pork Allergy      Stomach pain, cramping, diarrhea, itching, nausea and headaches    Shellfish Allergy Hives and Swelling    Shellfish-Derived Products     Sulfa Antibiotics Hives and Swelling    Sulfatolamide     Zithromax [Azithromycin Dihydrate] Swelling     Unknown             Medications:     Current Outpatient Medications   Medication Sig    acetaminophen (TYLENOL) 325 MG tablet Take 1-2 tablets (325-650 mg) by mouth every 6 hours as needed for pain (headache)    ADVAIR DISKUS 250-50 MCG/ACT inhaler Inhale 1 puff into the lungs 2 times daily    albuterol (2.5 MG/3ML) 0.083% neb solution INL 1 VIAL VIA NEBULIZATION Q 4 TO 6 HOURS PRN    albuterol (PROAIR HFA, PROVENTIL HFA, VENTOLIN HFA) 108 (90 BASE) MCG/ACT inhaler Inhale 2 puffs into the lungs every 6 hours as needed for shortness of breath / dyspnea or wheezing    BANOPHEN 25 MG tablet Take 25 mg by mouth At Bedtime    blood glucose (NO BRAND SPECIFIED) lancets standard Use to test blood sugar 1-4 times daily or as directed.    blood glucose (NO BRAND SPECIFIED) test strip Use to test blood sugar 1-4 times daily or as directed.    Blood Pressure Monitor KIT 1 each daily Monitor home blood pressure as instructed by physician.  Dispense [place] blood pressure kit.    calcium carbonate (TUMS) 500 MG chewable tablet Take 3-4 tablets (1,500-2,000 mg) by mouth daily as needed    clopidogrel (PLAVIX) 75 MG tablet Take 1 tablet (75 mg) by mouth daily .    clotrimazole (MYCELEX) 10 MG lozenge Place 1 lozenge (10 mg) inside cheek 5 times daily    cyclobenzaprine (FLEXERIL) 10 MG tablet Take 1 tablet (10 mg) by mouth 2 times daily as needed for muscle spasms (Patient not taking: Reported on 9/19/2023)    dicyclomine (BENTYL) 20 MG tablet TAKE 1 TABLET(20  MG) BY MOUTH FOUR TIMES DAILY AS NEEDED. (Patient not taking: Reported on 9/19/2023)    empagliflozin (JARDIANCE) 10 MG TABS tablet Take 1 tablet (10 mg) by mouth daily    EPIPEN 2-RIKY 0.3 MG/0.3ML injection INJECT 0.3 MG INTO THE MUSCLE PRF ANAPHYALAXIS    esomeprazole (NEXIUM) 40 MG DR capsule Take 1 capsule (40 mg) by mouth 2 times daily Take 30-60 minutes before eating. (Patient taking differently: Take 40 mg by mouth as needed Take 30-60 minutes before eating.)    estradiol (VAGIFEM) 10 MCG TABS vaginal tablet Place 1 tablet (10 mcg) vaginally twice a week    fluticasone (FLONASE) 50 MCG/ACT nasal spray Spray 1 spray in nostril as needed    folic acid (FOLVITE) 1 MG tablet Take 1 tablet (1 mg) by mouth daily (Patient taking differently: Take 1 mg by mouth at bedtime)    hydrocortisone (CORTAID) 1 % external cream Apply topically 2 times daily (Patient taking differently: Apply topically as needed)    insulin glargine (LANTUS PEN) 100 UNIT/ML pen Inject 55 Units Subcutaneous every morning Or as directed.    insulin pen needle (BD PEN NEEDLE MARCK 2ND GEN) 32G X 4 MM miscellaneous Use one pen needle daily or as directed.    ketoconazole (NIZORAL) 2 % shampoo Apply topically daily as needed    levocetirizine (XYZAL) 5 MG tablet Take 5 mg by mouth as needed    lisinopril-hydrochlorothiazide (ZESTORETIC) 20-25 MG tablet Take 1 tablet by mouth daily    magnesium 250 MG tablet TAKE 1 TABLET(250 MG) BY MOUTH TWICE DAILY (Patient taking differently: Take 1 tablet by mouth at bedtime)    metFORMIN (GLUCOPHAGE XR) 500 MG 24 hr tablet Take 4 tablets (2,000 mg) by mouth daily (with dinner)    methylPREDNISolone (MEDROL DOSEPAK) 4 MG tablet therapy pack Follow Package Directions    metoprolol succinate ER (TOPROL XL) 100 MG 24 hr tablet Take 1 tablet (100 mg) by mouth daily    Multiple Vitamin (TAB-A-GERALD) TABS Take 1 tablet by mouth daily (Patient taking differently: Take 1 tablet by mouth at bedtime)    nitroGLYcerin  (NITROSTAT) 0.4 MG sublingual tablet Place 1 tablet (0.4 mg) under the tongue every 5 minutes as needed for chest pain . After 3 doses, if pain persists call 911.    olopatadine (PATANOL) 0.1 % ophthalmic solution Place 1 drop into both eyes as needed    pravastatin (PRAVACHOL) 40 MG tablet Take 1 tablet (40 mg) by mouth daily    prochlorperazine (COMPAZINE) 5 MG tablet Take 1 tablet (5 mg) by mouth every 6 hours as needed for nausea or vomiting    sennosides (SENOKOT) 8.6 MG tablet 1-2 tabs a day as needed for constipation    spironolactone (ALDACTONE) 25 MG tablet Take 1 tablet (25 mg) by mouth daily. May also take 0.5 tablets (12.5 mg) daily as needed (for weight gain).    sucralfate (CARAFATE) 1 GM tablet Take 1 tablet (1 g) by mouth 4 times daily    terbinafine (LAMISIL) 1 % external cream Apply topically 2 times daily (Patient taking differently: Apply topically as needed)    tiZANidine (ZANAFLEX) 4 MG tablet Take 1 tablet (4 mg) by mouth 3 times daily as needed for muscle spasms    traMADol (ULTRAM) 50 MG tablet TAKE 1 TABLET(50 MG) BY MOUTH EVERY 8 HOURS AS NEEDED FOR SEVERE PAIN    vitamin D2 (ERGOCALCIFEROL) 79377 units (1250 mcg) capsule Take 1 capsule (50,000 Units) by mouth every 7 days     No current facility-administered medications for this visit.          Physical Exam:   GENERAL: Healthy, alert and no distress  EYES: Eyes grossly normal to inspection.  No discharge or erythema, or obvious scleral/conjunctival abnormalities.  RESP: No audible wheeze, cough, or visible cyanosis.  No visible retractions or increased work of breathing.    SKIN: Visible skin clear. No significant rash, abnormal pigmentation or lesions.  NEURO: Cranial nerves grossly intact.  Mentation and speech appropriate for age.  PSYCH: Mentation appears normal, affect normal/bright, judgement and insight intact, normal speech and appearance well-groomed.       Labs:      Color Urine (no units)   Date Value   09/06/2023 Light Yellow    05/24/2021 Light Yellow     Appearance Urine (no units)   Date Value   09/06/2023 Clear   05/24/2021 Clear     Glucose Urine (mg/dL)   Date Value   09/06/2023 >=1000 (A)   05/24/2021 >1000 (A)     Bilirubin Urine (no units)   Date Value   09/06/2023 Negative   05/24/2021 Negative     Ketones Urine (mg/dL)   Date Value   09/06/2023 Negative   05/24/2021 10 (A)     Specific Gravity Urine (no units)   Date Value   09/06/2023 1.034   05/24/2021 1.020     pH Urine   Date Value   09/06/2023 5.0   05/24/2021 5.5 pH     Protein Albumin Urine (mg/dL)   Date Value   09/06/2023 Negative   05/24/2021 Negative     Urobilinogen Urine (EU/dL)   Date Value   06/15/2018 0.2     Nitrite Urine (no units)   Date Value   09/06/2023 Negative   05/24/2021 Negative     Leukocyte Esterase Urine (no units)   Date Value   09/06/2023 Negative   05/24/2021 Negative       Creatinine   Date Value Ref Range Status   09/19/2023 1.06 (H) 0.51 - 0.95 mg/dL Final   05/28/2021 1.00 0.52 - 1.04 mg/dL Final       A1C   8.3   10/18/2023  Lab Results   Component Value Date    A1C 9.5 06/21/2023    A1C 8.5 11/23/2022    A1C 10.3 08/19/2022    A1C 11.2 05/14/2022    A1C 10.8 02/04/2022    A1C 8.0 06/18/2020    A1C 9.2 03/06/2019    A1C 9.6 11/09/2018    A1C 8.4 11/15/2017    A1C 8.8 06/28/2017           Imaging:    CT ABDOMEN PELVIS W/O CONTRAST, 2/10/2023     FINDINGS:  LOWER CHEST: Normal heart size. Coronary artery calcification versus  stents. No pericardial effusion. The lung bases are clear.     HEPATOBILIARY: Unremarkable nonenhanced appearance of the liver.  Cholecystectomy. No biliary ductal dilation.     SPLEEN: Normal.     PANCREAS: Normal.     ADRENAL GLANDS: Normal.     KIDNEYS/BLADDER: Normal.     BOWEL: Nondilated. Mild colonic diverticulosis without evidence of  diverticulitis. Normal appendix. There is some submucosal fatty  deposition in the cecum and ascending colonic wall however this has  improved from prior CT. No free air or free  fluid.     PELVIC ORGANS: Calcified uterine fibroids. IUD in fundal position,  oriented somewhat vertically, indeterminate whether the tip is within  the endometrium or myometrium (series 4 image 59), especially on  noncontrast imaging. No pelvic masses or free fluid.     LYMPH NODES: No lymphadenopathy.     VESSELS: Mild atherosclerotic calcifications. No aneurysm.     MUSCULOSKELETAL: Normal.                                                                      IMPRESSION:   1. Normal nonenhanced CT appearance of the pancreas. No acute  abnormality in the abdomen or pelvis.  2. Fibroid uterus with an IUD place. Questioning myometrial embedment  of the IUD, not well evaluated on noncontrast CT2. Recommend pelvic  ultrasound for further evaluation.     Virtual Visit Details    Type of service:  Video Visit   Video Start Time: 2:38 PM  Video End Time:2:53 PM    Originating Location (pt. Location): Home    Distant Location (provider location):  Off-site  Platform used for Video Visit: [employer]    40 minutes spent on the date of the encounter doing chart review, review of test results, interpretation of tests, patient visit, and documentation

## 2023-10-31 ENCOUNTER — VIRTUAL VISIT (OUTPATIENT)
Dept: UROLOGY | Facility: CLINIC | Age: 58
End: 2023-10-31
Attending: FAMILY MEDICINE
Payer: COMMERCIAL

## 2023-10-31 ENCOUNTER — PRE VISIT (OUTPATIENT)
Dept: UROLOGY | Facility: CLINIC | Age: 58
End: 2023-10-31

## 2023-10-31 VITALS — BODY MASS INDEX: 30.36 KG/M2 | WEIGHT: 166 LBS

## 2023-10-31 DIAGNOSIS — N39.46 MIXED STRESS AND URGE URINARY INCONTINENCE: Primary | ICD-10-CM

## 2023-10-31 PROCEDURE — 99244 OFF/OP CNSLTJ NEW/EST MOD 40: CPT | Mod: 95 | Performed by: PHYSICIAN ASSISTANT

## 2023-10-31 ASSESSMENT — PAIN SCALES - GENERAL: PAINLEVEL: MODERATE PAIN (4)

## 2023-10-31 NOTE — PATIENT INSTRUCTIONS
UROLOGY CLINIC VISIT PATIENT INSTRUCTIONS    Bladder Retraining  In this packet, you will learn 3 steps to help you improve your bladder control. If you have any questions regarding any of these steps once you are home, please feel free to call the office.   The 3 steps you will learn are:   Double Voiding   Fluid Guidelines  Timed Voiding   Double Voiding   A technique called double voiding can be used to help ensure that you have fully emptied your bladder while on the toilet. The main idea behind double voiding is to try to void two or even three times during each trip to the bathroom.  By doing this you can reduce residual urine volumes and minimize your chance of having an accident, an infection, or leakage later on.   The technique is simple.  Some people find that they can void, remain on the toilet for a rest period of two to five to ten minutes, and void again.  Others find it useful to void, then stand up and sit back down and attempt to void again.   You can also compress the bladder in order to empty more fully.  To do the Crede maneuver, press firmly with one hand (or both hands) directly into the abdomen over the bladder.  You may also find it helpful to lean forward or rock while sitting on the toilet in order to help empty the bladder better.  Fluid Guidelines   Managing your fluid intake can also help you improve your bladder control.  It is VERY important that you drink at least 5 to 7 glasses of fluid each day. The bulk of this fluid should be water. Drinking appropriate amounts of fluid daily and emptying your bladder at regular intervals helps decrease bladder infections. Managing your problem by restricting fluid intake is counterproductive, and NOT recommended.     Suggestions Regarding Fluid Intake  Try to spread your fluid consumption out over the course of the day rather than consuming large amounts at one sitting and then going long periods of time without drinking.   Try to minimize fluid  consumption after your evening meal.   Try to minimize caffeine and alcohol consumption. Use only decaffeinated coffee, tea or soda when possible.      Timed Voiding    It might be a good idea to start this approach to managing your problem on a weekend or when you plan to be at home or near a bathroom facility. The purpose of timed voiding is to gradually:   increase the amount of time between emptying your bladder   increase the amount of fluid your bladder can hold, and hopefully,   diminish the sense of urgency and/or leakage associated with your problem.     Week 1 - After awakening, empty your bladder every half-hour on the hour (even if you do NOT feel the need to go). Make sure you are drinking frequently. During the night only go to the bathroom if you waken and find it necessary     Week 2 - Increase the time between emptying your bladder to once per hour, following the above fluid and night instructions.     Week 3 - Increase the time between emptying your bladder to every 1 1/2 hours, following the above fluid and night instructions.   Week 4 - Increase the time between emptying your bladder to every 2 hours, following the above fluid and night instructions.  Work up to voiding every 3 to 3   hours if you can.     If you can already hold your bladder longer than 1/2 hour, you do not need to start at Week 1. Start at the point that is appropriate for you and work up from there. Just remember to 1) increase your voiding intervals by no more than 30 minutes at a time, 2) void regularly even if you do not feel the need to go, and 3) during the night, only go to the bathroom if you waken and find it necessary.   You will be the best  of how quickly you can advance to the next step. These instructions are an outline which you can modify (for example, you may find it more comfortable to stretch from 1 to 1 1/4 hours).   You may also increase the pace of this sample schedule, depending on your individual  "symptoms and bladder capacity. For example, you may increase the hourly increments every 5 days, instead of every 7 days.     Don t Get Discouraged  This program works! The keys to success are self-motivation and gradual increases in the time interval between voids. If you try to progress too rapidly, you will exceed your capabilities and become frustrated.    Some Additional Behavioral Techniques to Help Bladder Control  Do not rush to the bathroom. Try to be calm and maintain control. Rushing to the bathroom can intensify the urge for the bladder to contract.  Do several quick contractions of the pelvic floor muscles. Use effort to keep from leaking. If possible, sit down for direct pressure on the pelvic floor.  Relax. If you have practiced diaphragmatic breathing in the past, use that skill to relax. (Take slow, deep breaths through your nose, and then slowly breathe out through pursed lips.) Use distraction techniques to try and make the urinary urge go away.  When you feel the urge subside, walk slowly and normally to the bathroom. You can repeat the above steps to gain control if the urge returns.  You can slowly proceed to the toilet room to empty your bladder once the urge has subsided.        Below is a list of foods that can irritate the bladder that you should limit or avoid:  Caffeinated soft drinks.  Coffee.  Tea.  Chocolate.  Tomato-based foods.  Acidic juices and fruits.  Alcohol.  Carbonated drinks.  Aspartame/Nutrasweet.      Patient Education   Kegel Exercises: Care Instructions  Overview     Kegel exercises strengthen muscles around the bladder. These muscles control the flow of urine. Kegel exercises are sometime called \"pelvic floor\" exercises. They can help prevent urine leakage and keep the pelvic organs in place.  Kegel exercises can strengthen pelvic muscles that have been weakened by age, pregnancy, childbirth, and surgery. They may help prevent or treat urine leakage.  You do Kegel " "exercises by squeezing your pelvic floor muscles. You will likely need to do these exercises for several weeks to get better.  Follow-up care is a key part of your treatment and safety. Be sure to make and go to all appointments, and call your doctor if you are having problems. It's also a good idea to know your test results and keep a list of the medicines you take.  How can you care for yourself at home?  To do Kegel exercises:  Squeeze your muscles as if you were trying not to pass gas. Or squeeze your muscles as if you were stopping the flow of urine. Your belly, legs, and buttocks shouldn't move.  Hold the squeeze for 3 seconds, then relax for 5 to 10 seconds.  Start with 3 seconds, then add 1 second each week until you are able to squeeze for 10 seconds.  Repeat the exercise 10 times a session. Do 3 to 8 sessions a day.  When learning what muscles to squeeze, you can try stopping the flow of urine a few times. But don't make it a practice to do Kegels while urinating.  If doing these exercises causes pain, stop doing them and talk with your doctor. Sometimes people have pelvic floor muscles that are too tight. In these cases, doing Kegel exercises may cause more problems.  Check with your doctor if you don't notice a difference after trying these exercises for several weeks. Your doctor may suggest getting help from a physical therapist or recommend other treatment.  Where can you learn more?  Go to https://www.[employer].net/patiented  Enter Q681 in the search box to learn more about \"Kegel Exercises: Care Instructions.\"  Current as of: March 1, 2023               Content Version: 13.7    5328-0955 [employer].   Care instructions adapted under license by your healthcare professional. If you have questions about a medical condition or this instruction, always ask your healthcare professional. [employer] disclaims any warranty or liability for your use of this information.       "       Follow up in 2-3 months to recheck.    If you have any issues, questions or concerns in the meantime, do not hesitate to contact us at [phone] or via "[employer].     It was a pleasure meeting with you today.  Thank you for allowing me and my team the privilege of caring for you today.  YOU are the reason we are here, and I truly hope we provided you with the excellent service you deserve.  Please let us know if there is anything else we can do for you so that we can be sure you are leaving completely satisfied with your care experience.

## 2023-10-31 NOTE — NURSING NOTE
Is the patient currently in the state of [place]? YES    Visit mode:VIDEO    If the visit is dropped, the patient can be reconnected by: VIDEO VISIT: Text to cell phone:   Telephone Information:   Mobile [phone]       Will anyone else be joining the visit? NO  (If patient encounters technical issues they should call [phone]    How would you like to obtain your AVS? Declined    Are changes needed to the allergy or medication list? No, Pt stated no changes to allergies, and Pt stated no med changes    Reason for visit: FANNIE GRAY

## 2023-10-31 NOTE — LETTER
10/31/2023       RE: Janine Cornell  [address]  [facility] [address]     Dear Colleague,    Thank you for referring your patient, Janine Cornell, to the [facility] [place] [facility]. Please see a copy of my visit note below.    Name: Janine Cornell    MRN: [id number]   YOB: 1965                 Chief Complaint:   Urinary incontinence         Assessment and Plan:   58 year old female with mixed urinary incontinence. We discussed the different types of urinary incontinence and the varying treatment options for each. Also discussed the importance of tight glycemic control in managing her urinary symptoms. Her Jardiance medication may be contributing to irritative symptoms and increasing her risk for UTIs. We further discussed management options to include lifestyle modifications, bladder retraining, Kegel exercises, pelvic floor physical therapy, medications for OAB, surgery for stress incontinence, vs other. Strongly encouraged her to consider pelvic floor physical therapy but she declines a referral. Janine would like to proceed as follows:  -Bladder retraining exercises.  -Limit bladder irritants.   -Kegel exercises.  -Follow up in 2-3 months to recheck.     Priyanka William PA-C  October 30, 2023          History of Present Illness:   Janine Cornell is a 58 year old female with PMH of DM, HTN, HLD, CAD, among others seen today via virtual visit in consultation from Dr. Solano for evaluation of urinary incontinence. She reports a 1.5 year history of urinary incontinence with coughing, sneezing, strong urge, and sometimes without any warning. Incontinence is usually small volume. She wears panty liners and goes through 1-2 per day.  Feels that her incontinence worsens when her sciatica and back pain flares. She is not sure that she always completely empties her bladder. Nocturia x 0-1.    Over the last few years, she has had an  increase in UTIs as well. Symptoms include dysuria, frequency, urgency, and she has had gross hematuria associated with infections in the past (never in the absence of infections). Her last UTI was Feb 2023. She denies UTI symptoms currently.          Past Medical History:     Past Medical History:   Diagnosis Date    Abnormal glandular Papanicolaou smear of cervix 1992    Allergic rhinitis     Allergy, airborne subst    Arthritis     ASCVD (arteriosclerotic cardiovascular disease)     Chronic pain     Chronic pancreatitis (H)     idiopathic, Tx: PPI, antioxidants, pancreatic enzymes    Common migraine     Coronary artery disease     Costochondritis     Difficulty in walking(719.7)     Dyspnea on exertion     Ectasia, mammary duct     [facility], [facility] nipple discharge    Elevated fasting glucose     Gastro-oesophageal reflux disease     Granulomatosis with polyangiitis (H)     Hernia     History of angina     Hyperlipidemia LDL goal < 100     Hypertension goal BP (blood pressure) < 140/90     Essential hypertension    Iron deficiency anemia     Ischemic cardiomyopathy     Menorrhagia     Migraine headaches     Mild persistent asthma     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 Matias, neg w/u, Rx'd w/prednisone    Ventral hernia, unspecified, without  mention of obstruction or gangrene 2012            Past Surgical History:     Past Surgical History:   Procedure Laterality Date    C/SECTION, LOW TRANSVERSE  1996        CARDIAC SURGERY      cardiac stent- recent in 16; 4 other stents    COLONOSCOPY N/A 2023    Procedure: COLONOSCOPY, WITH POLYPECTOMY;  Surgeon: Percy Gross MD;  Location: [facility] OR    DILATION AND CURETTAGE N/A 2016    Procedure: DILATION AND CURETTAGE;  Surgeon: Nahed Butler MD;  [facility]    ENDOMETRIAL SAMPLING (BIOPSY) N/A 2023    Procedure: ENDOMETRIAL BIOPSY;  Surgeon: Nahed Butler MD;  [facility]    ESOPHAGOSCOPY, GASTROSCOPY, DUODENOSCOPY (EGD), COMBINED N/A 2022    Procedure: ESOPHAGOGASTRODUODENOSCOPY, WITH BIOPSY;  Surgeon: Enzo Sesay MD;  [facility]: [facility]    ESOPHAGOSCOPY, GASTROSCOPY, DUODENOSCOPY (EGD), COMBINED N/A 2023    Procedure: ESOPHAGOGASTRODUODENOSCOPY, WITH BIOPSY;  Surgeon: Pecry Gross MD;  Location: [facility] OR    EXAM UNDER ANESTHESIA PELVIC N/A 2023    Procedure: EXAM UNDER ANESTHESIA;  Surgeon: Nahed Butler MD;  [facility]    HC UGI ENDOSCOPY W EUS  2008    Dr. Pastrana, possible chronic pancreatitis, fatty liver    HERNIA REPAIR  2012    done at [facility] [place]    INSERT INTRAUTERINE DEVICE N/A 2016    Procedure: INSERT INTRAUTERINE DEVICE;  Surgeon: Nahed Butler MD;  [facility]    INT UTERINE FIBRIOD EMBOLIZATION  10/29/2014    LAPAROSCOPIC CHOLECYSTECTOMY  2008    Dr. Arnol GURBBS TX, CERVICAL  1992    s/p LEEP, done at [facility] in [place].    ORBITOTOMY Right 03/15/2016    Procedure: ORBITOTOMY;  Surgeon: Myron Cyr MD;  Location: [facility]    ORBITOTOMY Right 2017    Procedure: ORBITOTOMY;  RIGHT ORBITOTOMY AND BIOPSY;  Surgeon: Charis Holbrook MD;  Location:  [facility]    REMOVE INTRAUTERINE DEVICE N/A 2023    Procedure: Remove  intrauterine device,;  Surgeon: Nahed Butler MD;  [facility]    REPAIR PTOSIS Right 2017    Procedure: REPAIR PTOSIS;  RIGHT UPPER LID PTOSIS REPAIR;  Surgeon: Myron Cyr MD;  Location:  [facility]    UPPER GI ENDOSCOPY  10/21/2008    mild gastritis, Dr. Rocky CALDERA ECHO HEART XTHORACIC,COMPLETE, W/O DOPPLER  2004    [facility] WNL, EF 60%            Social History:     Social History     Tobacco Use    Smoking status: Former     Packs/day: 0.20     Years: 1.00     Additional pack years: 0.00     Total pack years: 0.20     Types: Cigarettes     Quit date: 2016     Years since quittin.7    Smokeless tobacco: Never   Substance Use Topics    Alcohol use: No     Alcohol/week: 0.0 standard drinks of alcohol            Family History:     Family History   Problem Relation Age of Onset    Hypertension Mother     Arthritis Mother     Heart Disease Mother         long qt syndrome    Thyroid Disease Mother     C.A.D. Mother     Heart Disease Father 50        heart attack    Cerebrovascular Disease Father     Diabetes Father     Hypertension Father     Depression Father     C.A.D. Father     Neurologic Disorder Sister         migraines    Neurologic Disorder Sister         migraines    Heart Disease Brother 15        MI at 15, 16.     Arthritis Brother         he passed away, had severe arthritis at age 11    C.A.D. Brother     Anesthesia Reaction Son         PONV    Respiratory Son         asthma    Hypertension Maternal Aunt     Diabetes Maternal Uncle     Hypertension Maternal Uncle     Arthritis Maternal Uncle     Eye Disorder Maternal Uncle         cataracts    Cerebrovascular Disease Maternal Uncle     Family History Negative Other         neg for RA, SLE    Unknown/Adopted No family hx of         unknown neurological issues in her family, mother was adopted    Skin Cancer No family hx of         No known family hx of skin cancer    Deep Vein Thrombosis (DVT) No  family hx of             Allergies:     Allergies   Allergen Reactions    Amoxicillin-Pot Clavulanate      Unknown possible hives and edema    Amoxicillin-Pot Clavulanate     Artificial Sweetner (Informational Only)  Other (See Comments)     Increased headache    Azithromycin     Clavulanic Acid     Diatrizoate Other (See Comments)     Pt wants this listed because she is allergic to shellfish     Imitrex [Triptans]      Severe face/neck/chest tightness and flushing side effects     Penicillins Hives     Unknown     Pork Allergy      Stomach pain, cramping, diarrhea, itching, nausea and headaches    Shellfish Allergy Hives and Swelling    Shellfish-Derived Products     Sulfa Antibiotics Hives and Swelling    Sulfatolamide     Zithromax [Azithromycin Dihydrate] Swelling     Unknown             Medications:     Current Outpatient Medications   Medication Sig    acetaminophen (TYLENOL) 325 MG tablet Take 1-2 tablets (325-650 mg) by mouth every 6 hours as needed for pain (headache)    ADVAIR DISKUS 250-50 MCG/ACT inhaler Inhale 1 puff into the lungs 2 times daily    albuterol (2.5 MG/3ML) 0.083% neb solution INL 1 VIAL VIA NEBULIZATION Q 4 TO 6 HOURS PRN    albuterol (PROAIR HFA, PROVENTIL HFA, VENTOLIN HFA) 108 (90 BASE) MCG/ACT inhaler Inhale 2 puffs into the lungs every 6 hours as needed for shortness of breath / dyspnea or wheezing    BANOPHEN 25 MG tablet Take 25 mg by mouth At Bedtime    blood glucose (NO BRAND SPECIFIED) lancets standard Use to test blood sugar 1-4 times daily or as directed.    blood glucose (NO BRAND SPECIFIED) test strip Use to test blood sugar 1-4 times daily or as directed.    Blood Pressure Monitor KIT 1 each daily Monitor home blood pressure as instructed by physician.  Dispense Ormon blood pressure kit.    calcium carbonate (TUMS) 500 MG chewable tablet Take 3-4 tablets (1,500-2,000 mg) by mouth daily as needed    clopidogrel (PLAVIX) 75 MG tablet Take 1 tablet (75 mg) by mouth daily .     clotrimazole (MYCELEX) 10 MG lozenge Place 1 lozenge (10 mg) inside cheek 5 times daily    cyclobenzaprine (FLEXERIL) 10 MG tablet Take 1 tablet (10 mg) by mouth 2 times daily as needed for muscle spasms (Patient not taking: Reported on 9/19/2023)    dicyclomine (BENTYL) 20 MG tablet TAKE 1 TABLET(20 MG) BY MOUTH FOUR TIMES DAILY AS NEEDED. (Patient not taking: Reported on 9/19/2023)    empagliflozin (JARDIANCE) 10 MG TABS tablet Take 1 tablet (10 mg) by mouth daily    EPIPEN 2-RIKY 0.3 MG/0.3ML injection INJECT 0.3 MG INTO THE MUSCLE PRF ANAPHYALAXIS    esomeprazole (NEXIUM) 40 MG DR capsule Take 1 capsule (40 mg) by mouth 2 times daily Take 30-60 minutes before eating. (Patient taking differently: Take 40 mg by mouth as needed Take 30-60 minutes before eating.)    estradiol (VAGIFEM) 10 MCG TABS vaginal tablet Place 1 tablet (10 mcg) vaginally twice a week    fluticasone (FLONASE) 50 MCG/ACT nasal spray Spray 1 spray in nostril as needed    folic acid (FOLVITE) 1 MG tablet Take 1 tablet (1 mg) by mouth daily (Patient taking differently: Take 1 mg by mouth at bedtime)    hydrocortisone (CORTAID) 1 % external cream Apply topically 2 times daily (Patient taking differently: Apply topically as needed)    insulin glargine (LANTUS PEN) 100 UNIT/ML pen Inject 55 Units Subcutaneous every morning Or as directed.    insulin pen needle (BD PEN NEEDLE MARCK 2ND GEN) 32G X 4 MM miscellaneous Use one pen needle daily or as directed.    ketoconazole (NIZORAL) 2 % shampoo Apply topically daily as needed    levocetirizine (XYZAL) 5 MG tablet Take 5 mg by mouth as needed    lisinopril-hydrochlorothiazide (ZESTORETIC) 20-25 MG tablet Take 1 tablet by mouth daily    magnesium 250 MG tablet TAKE 1 TABLET(250 MG) BY MOUTH TWICE DAILY (Patient taking differently: Take 1 tablet by mouth at bedtime)    metFORMIN (GLUCOPHAGE XR) 500 MG 24 hr tablet Take 4 tablets (2,000 mg) by mouth daily (with dinner)    methylPREDNISolone (MEDROL DOSEPAK)  4 MG tablet therapy pack Follow Package Directions    metoprolol succinate ER (TOPROL XL) 100 MG 24 hr tablet Take 1 tablet (100 mg) by mouth daily    Multiple Vitamin (TAB-A-GERALD) TABS Take 1 tablet by mouth daily (Patient taking differently: Take 1 tablet by mouth at bedtime)    nitroGLYcerin (NITROSTAT) 0.4 MG sublingual tablet Place 1 tablet (0.4 mg) under the tongue every 5 minutes as needed for chest pain . After 3 doses, if pain persists call 911.    olopatadine (PATANOL) 0.1 % ophthalmic solution Place 1 drop into both eyes as needed    pravastatin (PRAVACHOL) 40 MG tablet Take 1 tablet (40 mg) by mouth daily    prochlorperazine (COMPAZINE) 5 MG tablet Take 1 tablet (5 mg) by mouth every 6 hours as needed for nausea or vomiting    sennosides (SENOKOT) 8.6 MG tablet 1-2 tabs a day as needed for constipation    spironolactone (ALDACTONE) 25 MG tablet Take 1 tablet (25 mg) by mouth daily. May also take 0.5 tablets (12.5 mg) daily as needed (for weight gain).    sucralfate (CARAFATE) 1 GM tablet Take 1 tablet (1 g) by mouth 4 times daily    terbinafine (LAMISIL) 1 % external cream Apply topically 2 times daily (Patient taking differently: Apply topically as needed)    tiZANidine (ZANAFLEX) 4 MG tablet Take 1 tablet (4 mg) by mouth 3 times daily as needed for muscle spasms    traMADol (ULTRAM) 50 MG tablet TAKE 1 TABLET(50 MG) BY MOUTH EVERY 8 HOURS AS NEEDED FOR SEVERE PAIN    vitamin D2 (ERGOCALCIFEROL) 32561 units (1250 mcg) capsule Take 1 capsule (50,000 Units) by mouth every 7 days     No current facility-administered medications for this visit.          Physical Exam:   GENERAL: Healthy, alert and no distress  EYES: Eyes grossly normal to inspection.  No discharge or erythema, or obvious scleral/conjunctival abnormalities.  RESP: No audible wheeze, cough, or visible cyanosis.  No visible retractions or increased work of breathing.    SKIN: Visible skin clear. No significant rash, abnormal pigmentation or  lesions.  NEURO: Cranial nerves grossly intact.  Mentation and speech appropriate for age.  PSYCH: Mentation appears normal, affect normal/bright, judgement and insight intact, normal speech and appearance well-groomed.       Labs:      Color Urine (no units)   Date Value   09/06/2023 Light Yellow   05/24/2021 Light Yellow     Appearance Urine (no units)   Date Value   09/06/2023 Clear   05/24/2021 Clear     Glucose Urine (mg/dL)   Date Value   09/06/2023 >=1000 (A)   05/24/2021 >1000 (A)     Bilirubin Urine (no units)   Date Value   09/06/2023 Negative   05/24/2021 Negative     Ketones Urine (mg/dL)   Date Value   09/06/2023 Negative   05/24/2021 10 (A)     Specific Gravity Urine (no units)   Date Value   09/06/2023 1.034   05/24/2021 1.020     pH Urine   Date Value   09/06/2023 5.0   05/24/2021 5.5 pH     Protein Albumin Urine (mg/dL)   Date Value   09/06/2023 Negative   05/24/2021 Negative     Urobilinogen Urine (EU/dL)   Date Value   06/15/2018 0.2     Nitrite Urine (no units)   Date Value   09/06/2023 Negative   05/24/2021 Negative     Leukocyte Esterase Urine (no units)   Date Value   09/06/2023 Negative   05/24/2021 Negative       Creatinine   Date Value Ref Range Status   09/19/2023 1.06 (H) 0.51 - 0.95 mg/dL Final   05/28/2021 1.00 0.52 - 1.04 mg/dL Final       A1C   8.3   10/18/2023  Lab Results   Component Value Date    A1C 9.5 06/21/2023    A1C 8.5 11/23/2022    A1C 10.3 08/19/2022    A1C 11.2 05/14/2022    A1C 10.8 02/04/2022    A1C 8.0 06/18/2020    A1C 9.2 03/06/2019    A1C 9.6 11/09/2018    A1C 8.4 11/15/2017    A1C 8.8 06/28/2017           Imaging:    CT ABDOMEN PELVIS W/O CONTRAST, 2/10/2023     FINDINGS:  LOWER CHEST: Normal heart size. Coronary artery calcification versus  stents. No pericardial effusion. The lung bases are clear.     HEPATOBILIARY: Unremarkable nonenhanced appearance of the liver.  Cholecystectomy. No biliary ductal dilation.     SPLEEN: Normal.     PANCREAS: Normal.     ADRENAL  GLANDS: Normal.     KIDNEYS/BLADDER: Normal.     BOWEL: Nondilated. Mild colonic diverticulosis without evidence of  diverticulitis. Normal appendix. There is some submucosal fatty  deposition in the cecum and ascending colonic wall however this has  improved from prior CT. No free air or free fluid.     PELVIC ORGANS: Calcified uterine fibroids. IUD in fundal position,  oriented somewhat vertically, indeterminate whether the tip is within  the endometrium or myometrium (series 4 image 59), especially on  noncontrast imaging. No pelvic masses or free fluid.     LYMPH NODES: No lymphadenopathy.     VESSELS: Mild atherosclerotic calcifications. No aneurysm.     MUSCULOSKELETAL: Normal.                                                                      IMPRESSION:   1. Normal nonenhanced CT appearance of the pancreas. No acute  abnormality in the abdomen or pelvis.  2. Fibroid uterus with an IUD place. Questioning myometrial embedment  of the IUD, not well evaluated on noncontrast CT2. Recommend pelvic  ultrasound for further evaluation.     Virtual Visit Details    Type of service:  Video Visit   Video Start Time: 2:38 PM  Video End Time:2:53 PM    Originating Location (pt. Location): Home    Distant Location (provider location):  Off-site  Platform used for Video Visit: addwish    40 minutes spent on the date of the encounter doing chart review, review of test results, interpretation of tests, patient visit, and documentation

## 2023-11-07 ENCOUNTER — TELEPHONE (OUTPATIENT)
Dept: UROLOGY | Facility: CLINIC | Age: 58
End: 2023-11-07
Payer: COMMERCIAL

## 2023-11-24 ENCOUNTER — TELEPHONE (OUTPATIENT)
Dept: RHEUMATOLOGY | Facility: CLINIC | Age: 58
End: 2023-11-24
Payer: COMMERCIAL

## 2023-11-24 NOTE — TELEPHONE ENCOUNTER
Returned call to Rudy Means,  following up that Dr. Mckinnon will not be completing the functional capacity forms, this should be done by PCP.  's office had received the paper work this RN faxed back on 11/22 designating that Dr. Mckinnon will not be completing the paperwork.    All questions answered.    ISHA PhillipsN, RN  RN Care Coordinator Rheumatology

## 2023-11-28 ENCOUNTER — TRANSFERRED RECORDS (OUTPATIENT)
Dept: HEALTH INFORMATION MANAGEMENT | Facility: CLINIC | Age: 58
End: 2023-11-28

## 2023-12-06 DIAGNOSIS — M35.9 UNDIFFERENTIATED CONNECTIVE TISSUE DISEASE (H): ICD-10-CM

## 2023-12-06 DIAGNOSIS — M19.90 INFLAMMATORY ARTHRITIS: ICD-10-CM

## 2023-12-06 DIAGNOSIS — E55.9 VITAMIN D DEFICIENCY: ICD-10-CM

## 2023-12-06 DIAGNOSIS — M79.7 FIBROMYALGIA: ICD-10-CM

## 2023-12-06 NOTE — TELEPHONE ENCOUNTER
[facility] Call Center    Phone Message    May a detailed message be left on voicemail: yes     Reason for Call: Medication Refill Request    Has the patient contacted the pharmacy for the refill? Yes   Name of medication being requested:     traMADol (ULTRAM) 50 MG tablet     folic acid (FOLVITE) 1 MG tablet     vitamin D2 (ERGOCALCIFEROL) 34583 units (1250 mcg) capsule         cyclobenzaprine (FLEXERIL) 10 MG tablet     magnesium 250 MG tablet     Provider who prescribed the medication: Dr. Mckinnon     Pharmacy: [facility] [id number] [address]   Date medication is needed: asap         Action Taken: Other: Rheum     Travel Screening: Not Applicable

## 2023-12-07 RX ORDER — FOLIC ACID 1 MG/1
1 TABLET ORAL DAILY
Qty: 90 TABLET | Refills: 0 | Status: SHIPPED | OUTPATIENT
Start: 2023-12-07 | End: 2024-06-06

## 2023-12-07 RX ORDER — ERGOCALCIFEROL 1.25 MG/1
50000 CAPSULE, LIQUID FILLED ORAL
Qty: 4 CAPSULE | Refills: 0 | Status: SHIPPED | OUTPATIENT
Start: 2023-12-07 | End: 2024-02-07 | Stop reason: DRUGHIGH

## 2023-12-07 RX ORDER — MULTIVITAMIN WITH IRON
TABLET ORAL
Qty: 60 TABLET | Refills: 3 | Status: SHIPPED | OUTPATIENT
Start: 2023-12-07

## 2023-12-07 RX ORDER — CYCLOBENZAPRINE HCL 10 MG
10 TABLET ORAL 2 TIMES DAILY PRN
Qty: 60 TABLET | Refills: 3 | Status: SHIPPED | OUTPATIENT
Start: 2023-12-07

## 2023-12-07 NOTE — TELEPHONE ENCOUNTER
traMADol (ULTRAM) 50 MG tablet       Last Written Prescription Date:  12-30-22  Last Fill Quantity: 60,   # refills: 3  Last Office Visit : 9-6-23  Future Office visit:  1-31-24    Routing refill request to provider for review/approval because:  Drug not on the G, P or [facility] refill protocol or controlled substance    cyclobenzaprine (FLEXERIL) 10 MG tablet       Last Written Prescription Date:  11-5-21  Last Fill Quantity: 60,   # refills: 3    magnesium 250 MG tablet  Last Written Prescription Date:  9-1-22  Last Fill Quantity: 60   # refills: 3    vitamin D2 (ERGOCALCIFEROL) 34507 units (1250 mcg) capsule   Last Written Prescription Date:  7-27-23  Last Fill Quantity: 4,   # refills: 0    Routing refill request to provider for review/approval because:  Drug not on the [facility], [facility] or [facility] refill protocol or controlled substance  Gap in RF          pharm transfer:remaining rf sent  folic acid (FOLVITE) 1 MG tablet   90 tablet 3 2/6/2023  No  Sig - Route: Take 1 tablet (1 mg) by mouth daily - Oral  Prescribing Provider's NPI: [id number]  Majo Mckinnon

## 2023-12-08 NOTE — TELEPHONE ENCOUNTER
[place]  noted below for Tramadol.    Tramadol     Last Written Prescription Date:  05/01/2023  Last Fill Quantity: 60,   # refills: 0  Future Office visit:  01/31/2024      AUGUSTINE Phillips, RN  RN Care Coordinator Rheumatology             AUGUSTINE Phillips, RN  RN Care Coordinator Rheumatology

## 2023-12-09 ENCOUNTER — TRANSFERRED RECORDS (OUTPATIENT)
Dept: HEALTH INFORMATION MANAGEMENT | Facility: CLINIC | Age: 58
End: 2023-12-09

## 2023-12-09 RX ORDER — TRAMADOL HYDROCHLORIDE 50 MG/1
TABLET ORAL
Qty: 60 TABLET | Refills: 3 | Status: SHIPPED | OUTPATIENT
Start: 2023-12-09

## 2023-12-12 ENCOUNTER — TRANSFERRED RECORDS (OUTPATIENT)
Dept: HEALTH INFORMATION MANAGEMENT | Facility: CLINIC | Age: 58
End: 2023-12-12

## 2023-12-14 ENCOUNTER — LAB (OUTPATIENT)
Dept: LAB | Facility: CLINIC | Age: 58
End: 2023-12-14
Attending: INTERNAL MEDICINE
Payer: COMMERCIAL

## 2023-12-14 ENCOUNTER — OFFICE VISIT (OUTPATIENT)
Dept: CARDIOLOGY | Facility: CLINIC | Age: 58
End: 2023-12-14
Attending: INTERNAL MEDICINE
Payer: COMMERCIAL

## 2023-12-14 VITALS
HEART RATE: 73 BPM | OXYGEN SATURATION: 97 % | WEIGHT: 168 LBS | BODY MASS INDEX: 30.73 KG/M2 | SYSTOLIC BLOOD PRESSURE: 111 MMHG | DIASTOLIC BLOOD PRESSURE: 73 MMHG

## 2023-12-14 DIAGNOSIS — E78.5 HYPERLIPIDEMIA LDL GOAL <70: ICD-10-CM

## 2023-12-14 DIAGNOSIS — I25.10 CORONARY ARTERY DISEASE INVOLVING NATIVE HEART WITHOUT ANGINA PECTORIS, UNSPECIFIED VESSEL OR LESION TYPE: Primary | ICD-10-CM

## 2023-12-14 DIAGNOSIS — I10 HYPERTENSION, UNSPECIFIED TYPE: ICD-10-CM

## 2023-12-14 DIAGNOSIS — I10 HYPERTENSION GOAL BP (BLOOD PRESSURE) < 140/90: ICD-10-CM

## 2023-12-14 DIAGNOSIS — I21.4 NSTEMI (NON-ST ELEVATED MYOCARDIAL INFARCTION) (H): ICD-10-CM

## 2023-12-14 LAB
ALBUMIN SERPL BCG-MCNC: 4.6 G/DL (ref 3.5–5.2)
ALP SERPL-CCNC: 77 U/L (ref 40–150)
ALT SERPL W P-5'-P-CCNC: 22 U/L (ref 0–50)
ANION GAP SERPL CALCULATED.3IONS-SCNC: 12 MMOL/L (ref 7–15)
AST SERPL W P-5'-P-CCNC: 25 U/L (ref 0–45)
BILIRUB SERPL-MCNC: 0.3 MG/DL
BUN SERPL-MCNC: 18.1 MG/DL (ref 6–20)
CALCIUM SERPL-MCNC: 9.5 MG/DL (ref 8.6–10)
CHLORIDE SERPL-SCNC: 101 MMOL/L (ref 98–107)
CHOLEST SERPL-MCNC: 145 MG/DL
CREAT SERPL-MCNC: 1.05 MG/DL (ref 0.51–0.95)
DEPRECATED HCO3 PLAS-SCNC: 26 MMOL/L (ref 22–29)
EGFRCR SERPLBLD CKD-EPI 2021: 62 ML/MIN/1.73M2
FASTING STATUS PATIENT QL REPORTED: NO
GLUCOSE SERPL-MCNC: 205 MG/DL (ref 70–99)
HDLC SERPL-MCNC: 36 MG/DL
LDLC SERPL CALC-MCNC: 82 MG/DL
NONHDLC SERPL-MCNC: 109 MG/DL
POTASSIUM SERPL-SCNC: 4 MMOL/L (ref 3.4–5.3)
PROT SERPL-MCNC: 7.3 G/DL (ref 6.4–8.3)
SODIUM SERPL-SCNC: 139 MMOL/L (ref 135–145)
TRIGL SERPL-MCNC: 134 MG/DL

## 2023-12-14 PROCEDURE — 99214 OFFICE O/P EST MOD 30 MIN: CPT | Performed by: INTERNAL MEDICINE

## 2023-12-14 PROCEDURE — 80053 COMPREHEN METABOLIC PANEL: CPT | Performed by: PATHOLOGY

## 2023-12-14 PROCEDURE — 83721 ASSAY OF BLOOD LIPOPROTEIN: CPT | Performed by: INTERNAL MEDICINE

## 2023-12-14 PROCEDURE — 80061 LIPID PANEL: CPT | Performed by: PATHOLOGY

## 2023-12-14 PROCEDURE — G0463 HOSPITAL OUTPT CLINIC VISIT: HCPCS | Performed by: INTERNAL MEDICINE

## 2023-12-14 PROCEDURE — 99000 SPECIMEN HANDLING OFFICE-LAB: CPT | Performed by: PATHOLOGY

## 2023-12-14 PROCEDURE — 36415 COLL VENOUS BLD VENIPUNCTURE: CPT | Performed by: PATHOLOGY

## 2023-12-14 RX ORDER — NITROGLYCERIN 0.4 MG/1
TABLET SUBLINGUAL
Qty: 30 TABLET | Refills: 11 | Status: SHIPPED | OUTPATIENT
Start: 2023-12-14 | End: 2024-06-05

## 2023-12-14 ASSESSMENT — PAIN SCALES - GENERAL: PAINLEVEL: NO PAIN (0)

## 2023-12-14 NOTE — LETTER
12/14/2023      RE: Janine Cornell  [address]  [facility] [address]       Dear Colleague,    Thank you for the opportunity to participate in the care of your patient, Janine Cornell, at the [facility] [place] [facility]. Please see a copy of my visit note below.    December 14, 2023      HPI:   I had the privilege to evaluate and examine Ms Janine Cornell, who is a 58 yr -American patient with DM2, Hypothyroidism, PCOS, Dyslipidemia, HTN, and CAD S/P NSTEMI s/p KATHERINE to the mLAD in 2011 and RCA in 2016.       Patient has a positive RA factor and fibromyalgia and follows with Dr Rodriguez for the effect of plaquenil on her retina.    She was last seen in clinic in June 2023. Since then, she continues to have dyspnea with exertion. She has dizziness when she is standing for too long. She continues to have numbness in her feet. She has taken her prescribed nitroglycerin three times (with appropriate decrease in her chest pain). She also reports feeling diffuse swelling in her hands legs and feet.     PAST MEDICAL HISTORY:  Past Medical History:   Diagnosis Date     Abnormal glandular Papanicolaou smear of cervix 1992     Allergic rhinitis     Allergy, airborne subst     Arthritis      ASCVD (arteriosclerotic cardiovascular disease)      Chronic pain      Chronic pancreatitis (H)     idiopathic, Tx: PPI, antioxidants, pancreatic enzymes     Common migraine      Coronary artery disease      Costochondritis      Difficulty in walking(719.7)      Dyspnea on exertion      Ectasia, mammary duct     [facility], persistent nipple discharge     Elevated fasting glucose      Gastro-oesophageal reflux disease      Granulomatosis with polyangiitis (H)      Hernia      History of angina      Hyperlipidemia LDL goal < 100      Hypertension goal BP (blood pressure) < 140/90     Essential hypertension     Iron deficiency anemia      Ischemic  cardiomyopathy      Menorrhagia      Migraine headaches      Mild persistent asthma      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 Matias, neg w/u, Rx'd w/prednisone     Ventral hernia, unspecified, without mention of obstruction or gangrene 07/2012       CURRENT MEDICATIONS:  Current Outpatient Medications   Medication Sig Dispense Refill     acetaminophen (TYLENOL) 325 MG tablet Take 1-2 tablets (325-650 mg) by mouth every 6 hours as needed for pain (headache) 250 tablet 4     ADVAIR DISKUS 250-50 MCG/ACT inhaler Inhale 1 puff into the lungs 2 times daily       albuterol (2.5 MG/3ML) 0.083% neb solution INL 1 VIAL VIA NEBULIZATION Q 4 TO 6 HOURS PRN  1     albuterol (PROAIR HFA, PROVENTIL HFA, VENTOLIN HFA) 108 (90 BASE) MCG/ACT inhaler Inhale 2 puffs into the lungs every 6 hours as needed for shortness of breath / dyspnea or wheezing 3 Inhaler 1     BANOPHEN 25 MG tablet Take 25 mg by mouth At Bedtime       blood glucose (NO BRAND SPECIFIED) lancets standard Use to test blood sugar 1-4 times daily or as directed. 400 each 3     blood glucose (NO BRAND SPECIFIED) test strip Use to test blood sugar 1-4 times daily or as directed. 400 strip 3     Blood Pressure Monitor KIT 1 each daily Monitor home blood pressure as instructed by physician.  Dispense [place] blood  pressure kit. 1 kit 0     calcium carbonate (TUMS) 500 MG chewable tablet Take 3-4 tablets (1,500-2,000 mg) by mouth daily as needed 90 tablet 3     clopidogrel (PLAVIX) 75 MG tablet Take 1 tablet (75 mg) by mouth daily . 90 tablet 3     clotrimazole (MYCELEX) 10 MG lozenge Place 1 lozenge (10 mg) inside cheek 5 times daily 50 lozenge 1     cyclobenzaprine (FLEXERIL) 10 MG tablet Take 1 tablet (10 mg) by mouth 2 times daily as needed for muscle spasms 60 tablet 3     dicyclomine (BENTYL) 20 MG tablet TAKE 1 TABLET(20 MG) BY MOUTH FOUR TIMES DAILY AS NEEDED. (Patient not taking: Reported on 9/19/2023) 60 tablet 0     empagliflozin (JARDIANCE) 10 MG TABS tablet Take 1 tablet (10 mg) by mouth daily 30 tablet 3     EPIPEN 2-RIKY 0.3 MG/0.3ML injection INJECT 0.3 MG INTO THE MUSCLE PRF ANAPHYALAXIS  0     esomeprazole (NEXIUM) 40 MG DR capsule Take 1 capsule (40 mg) by mouth 2 times daily Take 30-60 minutes before eating. (Patient taking differently: Take 40 mg by mouth as needed Take 30-60 minutes before eating.) 180 capsule 0     estradiol (VAGIFEM) 10 MCG TABS vaginal tablet Place 1 tablet (10 mcg) vaginally twice a week 24 tablet 3     fluticasone (FLONASE) 50 MCG/ACT nasal spray Spray 1 spray in nostril as needed       folic acid (FOLVITE) 1 MG tablet Take 1 tablet (1 mg) by mouth daily 90 tablet 0     hydrocortisone (CORTAID) 1 % external cream Apply topically 2 times daily (Patient taking differently: Apply topically as needed) 60 g 1     insulin glargine (LANTUS PEN) 100 UNIT/ML pen Inject 55 Units Subcutaneous every morning Or as directed. 75 mL 3     insulin pen needle (BD PEN NEEDLE MARCK 2ND GEN) 32G X 4 MM miscellaneous Use one pen needle daily or as directed. 100 each 3     ketoconazole (NIZORAL) 2 % shampoo Apply topically daily as needed       levocetirizine (XYZAL) 5 MG tablet Take 5 mg by mouth as needed       lisinopril-hydrochlorothiazide (ZESTORETIC) 20-25 MG tablet Take 1 tablet by mouth daily 90  tablet 3     magnesium 250 MG tablet TAKE 1 TABLET(250 MG) BY MOUTH TWICE DAILY 60 tablet 3     metFORMIN (GLUCOPHAGE XR) 500 MG 24 hr tablet Take 4 tablets (2,000 mg) by mouth daily (with dinner) 360 tablet 3     methylPREDNISolone (MEDROL DOSEPAK) 4 MG tablet therapy pack Follow Package Directions 21 tablet 0     metoprolol succinate ER (TOPROL XL) 100 MG 24 hr tablet Take 1 tablet (100 mg) by mouth daily 90 tablet 3     Multiple Vitamin (TAB-A-GERALD) TABS Take 1 tablet by mouth daily (Patient taking differently: Take 1 tablet by mouth at bedtime) 90 tablet 1     nitroGLYcerin (NITROSTAT) 0.4 MG sublingual tablet Place 1 tablet (0.4 mg) under the tongue every 5 minutes as needed for chest pain . After 3 doses, if pain persists call 911. 25 tablet 3     olopatadine (PATANOL) 0.1 % ophthalmic solution Place 1 drop into both eyes as needed       pravastatin (PRAVACHOL) 40 MG tablet Take 1 tablet (40 mg) by mouth daily 90 tablet 3     prochlorperazine (COMPAZINE) 5 MG tablet Take 1 tablet (5 mg) by mouth every 6 hours as needed for nausea or vomiting 60 tablet 3     sennosides (SENOKOT) 8.6 MG tablet 1-2 tabs a day as needed for constipation 60 tablet 1     spironolactone (ALDACTONE) 25 MG tablet Take 1 tablet (25 mg) by mouth daily. May also take 0.5 tablets (12.5 mg) daily as needed (for weight gain). 45 tablet 9     sucralfate (CARAFATE) 1 GM tablet Take 1 tablet (1 g) by mouth 4 times daily 120 tablet 3     terbinafine (LAMISIL) 1 % external cream Apply topically 2 times daily (Patient taking differently: Apply topically as needed) 42 g 1     tiZANidine (ZANAFLEX) 4 MG tablet Take 1 tablet (4 mg) by mouth 3 times daily as needed for muscle spasms 21 tablet 3     traMADol (ULTRAM) 50 MG tablet TAKE 1 TABLET(50 MG) BY MOUTH EVERY 8 HOURS AS NEEDED FOR SEVERE PAIN 60 tablet 3     vitamin D2 (ERGOCALCIFEROL) 51231 units (1250 mcg) capsule Take 1 capsule (50,000 Units) by mouth every 7 days 4 capsule 0       PAST  SURGICAL HISTORY:  Past Surgical History:   Procedure Laterality Date     C/SECTION, LOW TRANSVERSE  1996         CARDIAC SURGERY      cardiac stent- recent in 16; 4 other stents     COLONOSCOPY N/A 2023    Procedure: COLONOSCOPY, WITH POLYPECTOMY;  Surgeon: Percy Gross MD;  Location: [facility] OR     DILATION AND CURETTAGE N/A 2016    Procedure: DILATION AND CURETTAGE;  Surgeon: Nahed Butler MD;  [facility]     ENDOMETRIAL SAMPLING (BIOPSY) N/A 2023    Procedure: ENDOMETRIAL BIOPSY;  Surgeon: Nahed Butler MD;  [facility]     ESOPHAGOSCOPY, GASTROSCOPY, DUODENOSCOPY (EGD), COMBINED N/A 2022    Procedure: ESOPHAGOGASTRODUODENOSCOPY, WITH BIOPSY;  Surgeon: Enzo Sesay MD;  [facility]: [facility]     ESOPHAGOSCOPY, GASTROSCOPY, DUODENOSCOPY (EGD), COMBINED N/A 2023    Procedure: ESOPHAGOGASTRODUODENOSCOPY, WITH BIOPSY;  Surgeon: Percy Gross MD;  Location: [facility] OR     EXAM UNDER ANESTHESIA PELVIC N/A 2023    Procedure: EXAM UNDER ANESTHESIA;  Surgeon: Nahed Butler MD;  [facility]     HC UGI ENDOSCOPY W EUS  2008    Dr. Pastrana, possible chronic pancreatitis, fatty liver     HERNIA REPAIR  2012    done at [facility]     INSERT INTRAUTERINE DEVICE N/A 2016    Procedure: INSERT INTRAUTERINE DEVICE;  Surgeon: Nahed Butler MD;  [facility]     INT UTERINE FIBRIOD EMBOLIZATION  10/29/2014     LAPAROSCOPIC CHOLECYSTECTOMY  2008    Dr. Arnol GRUBBS TX, CERVICAL  1992    s/p LEEP, done at [facility] in [place].     ORBITOTOMY Right 03/15/2016    Procedure: ORBITOTOMY;  Surgeon: Myron Cyr MD;  [facility]     ORBITOTOMY Right 2017    Procedure: ORBITOTOMY;  RIGHT ORBITOTOMY AND BIOPSY;  Surgeon: Charis Holbrook MD;  [facility]     REMOVE INTRAUTERINE DEVICE N/A 2023    Procedure: Remove intrauterine device,;  Surgeon: Nahed Butler MD;   [facility]     REPAIR PTOSIS Right 2017    Procedure: REPAIR PTOSIS;  RIGHT UPPER LID PTOSIS REPAIR;  Surgeon: Myron Cyr MD;  Location: [facility]     UPPER GI ENDOSCOPY  10/21/2008    mild gastritis, Dr. Rocky [facility] ECHO HEART XTHORACIC,COMPLETE, W/O DOPPLER  2004    [facility] WNL, EF 60%       ALLERGIES     Allergies   Allergen Reactions     Amoxicillin-Pot Clavulanate      Unknown possible hives and edema     Amoxicillin-Pot Clavulanate      Artificial Sweetner (Informational Only)  Other (See Comments)     Increased headache     Azithromycin      Clavulanic Acid      Diatrizoate Other (See Comments)     Pt wants this listed because she is allergic to shellfish      Imitrex [Triptans]      Severe face/neck/chest tightness and flushing side effects      Penicillins Hives     Unknown      Pork Allergy      Stomach pain, cramping, diarrhea, itching, nausea and headaches     Shellfish Allergy Hives and Swelling     Shellfish-Derived Products      Sulfa Antibiotics Hives and Swelling     Sulfatolamide      Zithromax [Azithromycin Dihydrate] Swelling     Unknown        FAMILY HISTORY:  Family History   Problem Relation Age of Onset     Hypertension Mother      Arthritis Mother      Heart Disease Mother         long qt syndrome     Thyroid Disease Mother      C.A.D. Mother      Heart Disease Father 50        heart attack     Cerebrovascular Disease Father      Diabetes Father      Hypertension Father      Depression Father      C.A.D. Father      Neurologic Disorder Sister         migraines     Neurologic Disorder Sister         migraines     Heart Disease Brother 15        MI at 15, 16.      Arthritis Brother         he passed away, had severe arthritis at age 11     C.A.D. Brother      Anesthesia Reaction Son         PONV     Respiratory Son         asthma     Hypertension Maternal Aunt      Diabetes Maternal Uncle      Hypertension Maternal Uncle      Arthritis Maternal  Uncle      Eye Disorder Maternal Uncle         cataracts     Cerebrovascular Disease Maternal Uncle      Family History Negative Other         neg for RA, SLE     Unknown/Adopted No family hx of         unknown neurological issues in her family, mother was adopted     Skin Cancer No family hx of         No known family hx of skin cancer     Deep Vein Thrombosis (DVT) No family hx of        SOCIAL HISTORY:  Social History     Socioeconomic History     Marital status: Single     Spouse name: None     Number of children: 1     Years of education: None     Highest education level: None   Occupational History     Employer: NONE    Tobacco Use     Smoking status: Current Some Day Smoker     Packs/day: 0.20     Years: 1.00     Pack years: 0.20     Types: Cigarettes     Last attempt to quit: 2016     Years since quittin.4     Smokeless tobacco: Never Used   Substance and Sexual Activity     Alcohol use: No     Alcohol/week: 0.0 standard drinks     Drug use: No     Sexual activity: Not Currently   Other Topics Concern     Parent/sibling w/ CABG, MI or angioplasty before 65F 55M? Yes   Social History Narrative    Balanced Diet - sometimes    Osteoporosis Prevention Measures - Dairy servings per day: 2 servings weekly    Regular Exercise -  Yes Describe walking 4 times a week    Dental Exam - NO    Seatbelts used - Yes    Self Breast Exam - Yes    Abuse: Current or Past (Physical, Sexual or Emotional)- No    Do you have any concerns about STD's -  No    Do you feel safe in your environment - No    Guns stored in the home - No    Sunscreen used - Yes    Lipids -  YES - Date:     Glucose -  YES - Date:     Eye Exam - YES - Date: one year ago    Colon Cancer Screening - No    Hemoccults - NO    Pap Test -  YES - Date: , remote history of LEEP    Mammography - YES - Date: last spring, would like to discuss, needs a referral to [facility]    DEXA - NO    Immunizations reviewed and up to  "date - Yes, last td given in 1997 or 1998       ROS:   Constitutional: No fever, chills, or sweats. No weight gain/loss   ENT: No visual disturbance, ear ache, epistaxis, sore throat  Allergies/Immunologic: Negative.   Respiratory: No cough, hemoptysia  Cardiovascular: As per HPI  GI: No nausea, vomiting, hematemesis, melena, or hematochezia  : No urinary frequency, dysuria, or hematuria  Integument: Negative  Psychiatric: Negative  Neuro: Negative  Endocrinology: Negative   Musculoskeletal: Negative    EXAM:    Encounter date  Last reading 12/14/23  2:34 PM 10/18/23  2:06 PM 9/6/23  3:09 PM 9/6/23  1:49 PM 8/1/23  2:39 PM   /73 128/85 115/81 115/81 120/82   Pulse 73 75 84 84 69   Height -- -- 1.575 m (5' 2\") 1.575 m (5' 2.01\") --   Weight 76.2 kg (168 lb)         /72 at the  end of visit  In general, the patient is a pleasant female in no apparent distress.    HEENT: NC/AT.  PERRLA.  EOMI.  Sclerae white, not injected.  Nares clear.  Pharynx without erythema or exudate.  Dentition intact.    Neck: No adenopathy.  No thyromegaly. Carotids +4/4 bilaterally without bruits.  No jugular venous distension.   Heart: RRR. Normal S1, S2 splits physiologically. No murmur, rub, click, or gallop. The PMI is in the 5th ICS in the midclavicular line. There is no heave.    Lungs: CTA.  No ronchi, wheezes, rales.  No dullness to percussion.   Abdomen: Soft, nontender, nondistended. No organomegaly.  No bruits.   Extremities: No clubbing, cyanosis, or edema.  The pulses are +4/4 at the radial, brachial, femoral, popliteal, DP, and PT sites bilaterally.  No bruits are noted.  Neurologic: Alert and oriented to person/place/time, normal speech, gait and affect  Skin: No petechiae, purpura or rash.    Labs:  LIPID RESULTS:  Lab Results   Component Value Date    CHOL 161 06/29/2023    CHOL 102 06/18/2020    HDL 42 (L) 06/29/2023    HDL 30 (L) 06/18/2020    LDL 89 06/29/2023    LDL 97 06/29/2023    LDL 50 06/18/2020    TRIG " 148 06/29/2023    TRIG 104 06/18/2020    CHOLHDLRATIO 3.5 07/29/2015    NHDL 119 06/29/2023    NHDL 71 06/18/2020       LIVER ENZYME RESULTS:  Lab Results   Component Value Date    AST 24 09/19/2023    AST 20 05/28/2021    ALT 25 09/19/2023    ALT 28 05/28/2021       CBC RESULTS:  Lab Results   Component Value Date    WBC 13.7 (H) 09/19/2023    WBC 9.0 05/28/2021    RBC 5.16 09/19/2023    RBC 4.74 05/28/2021    HGB 13.2 09/19/2023    HGB 12.5 05/28/2021    HCT 40.3 09/19/2023    HCT 36.7 05/28/2021    MCV 78 09/19/2023    MCV 77 (L) 05/28/2021    MCH 25.6 (L) 09/19/2023    MCH 26.4 (L) 05/28/2021    MCHC 32.8 09/19/2023    MCHC 34.1 05/28/2021    RDW 14.5 09/19/2023    RDW 12.8 05/28/2021     09/19/2023     05/28/2021       BMP RESULTS:  Lab Results   Component Value Date     06/29/2023     05/28/2021    POTASSIUM 3.5 06/29/2023    POTASSIUM 3.9 07/08/2022    POTASSIUM 3.5 05/28/2021    CHLORIDE 97 (L) 06/29/2023    CHLORIDE 108 07/08/2022    CHLORIDE 106 05/28/2021    CO2 26 06/29/2023    CO2 25 07/08/2022    CO2 27 05/28/2021    ANIONGAP 14 06/29/2023    ANIONGAP 10 07/08/2022    ANIONGAP 5 05/28/2021    GLC 87 06/29/2023     (H) 05/25/2023     (H) 07/08/2022     (H) 05/28/2021    BUN 22.7 (H) 06/29/2023    BUN 17 07/08/2022    BUN 12 05/28/2021    CR 1.06 (H) 09/19/2023    CR 1.00 05/28/2021    GFRESTIMATED 61 09/19/2023    GFRESTIMATED 64 05/28/2021    GFRESTBLACK 74 05/28/2021    KATHY 10.1 (H) 06/29/2023    KATHY 9.4 05/28/2021        A1C RESULTS:  Lab Results   Component Value Date    A1C 9.5 (H) 06/21/2023    A1C 8.0 (H) 06/18/2020       INR RESULTS:  Lab Results   Component Value Date    INR 0.97 08/25/2016    INR 0.98 05/20/2015     Imaging Echocardiogram 06-29-23    Global and regional left ventricular function is normal with an EF of 55-60%.  Global right ventricular function is normal. The right ventricle is normal  size.  No significant valvular  abnormalities.  IVC diameter <2.1 cm collapsing >50% with sniff suggests a normal RA pressure  of 3 mmHg.  This study was compared with the study from 05/25/2021. No significant changes  noted.  ______________________________________________________________________________  Left Ventricle  Global and regional left ventricular function is normal with an EF of 55-60%.  Left ventricular size is normal. Relative wall thickness is increased  consistent with concentric remodeling. Left ventricular diastolic function is  indeterminate.     Right Ventricle  Global right ventricular function is normal. The right ventricle is normal  size.     Atria  Both atria appear normal.     Mitral Valve  Mild mitral annular calcification is present. Trace mitral insufficiency is  present.     Aortic Valve  The valve leaflets are not well visualized. On Doppler interrogation, there is  no significant stenosis or regurgitation.     Tricuspid Valve  The valve leaflets are not well visualized. Trace tricuspid insufficiency is  present. Pulmonary artery systolic pressure cannot be assessed.     Pulmonic Valve  The valve leaflets are not well visualized. Trace pulmonic insufficiency is  present.     Vessels  Sinuses of Valsalva 3.1 cm. Ascending aorta 2.9 cm. IVC diameter <2.1 cm  collapsing >50% with sniff suggests a normal RA pressure of 3 mmHg.     Pericardium  No pericardial effusion is present.     Compared to Previous Study  This study was compared with the study from 05/25/2021 . No significant  changes noted.  ______________________________________________________________________________  MMode/2D Measurements & Calculations     IVSd: 1.0 cm  LVIDd: 3.9 cm  LVIDs: 2.5 cm  LVPWd: 1.0 cm  FS: 35.7 %  LV mass(C)d: 125.6 grams  LV mass(C)dI: 71.3 grams/m2  Ao root diam: 3.1 cm  asc Aorta Diam: 2.9 cm  LVOT diam: 2.0 cm  LVOT area: 3.1 cm2  LA Volume (BP): 34.4 ml  LA Volume Index (BP): 19.5 ml/m2  RV Base: 2.4 cm     RWT: 0.51  TAPSE: 2.0  cm     Doppler Measurements & Calculations  MV E max clay: 55.3 cm/sec  MV A max clay: 86.5 cm/sec  MV E/A: 0.64  MV dec time: 0.18 sec  LV V1 max PG: 3.9 mmHg  LV V1 max: 99.2 cm/sec  LV V1 VTI: 19.5 cm  SV(LVOT): 59.8 ml  SI(LVOT): 33.9 ml/m2  E/E' av.1  Lateral E/e': 10.4  Medial E/e': 13.7  RV S Clay: 13.5 cm/sec           Assessment and Plan:     I discussed the results with patient:     # Dyslipidemia  # HTN  # CAD S/P NSTEMI s/p KATHERINE to the mLAD in  and RCA in 2016  # DM2  # Hypothyroidism  # PCOS  # RA  # Fibromyalgia     Low suspicion for cardiac etiology for her profound fatigue and exertional dyspnea.  Transthoracic echocardiogram today shows normal biventricular function with no significant valvular abnormalities.  Blood pressure is well controlled on her current regimen.    We discussed the results with patient.  We discussed the importance of a heart healthy diet and lifestyle.  We discussed following points with patient:       Follow the American Heart Association Diet and Lifestyle Recommendations:  -Limit saturated fat, trans fat, sodium, red meat, sweets and sugar-sweetened beverages. If you choose to eat red meat, compare labels and select the leanest cuts available    Medication Changes: None     Follow Up: In 1 year with fasting labs and echocardiogram prior    Discussed with Dr. Kobi Morales MD, PhD  Cardiology Fellow  Click to text page [phone]      I saw and evaluated patient with CV fellow. I examined patient with CV fellow. I discussed the results with patient and CV fellow. I discussed our plan with patient and CV fellow.  I agree with CV fellow's note and I edited the CV fellow's note to make it a more comprehensive document.    Milan Peace MD, PhD  Professor of Medicine  Division of Cardiology      Please do not hesitate to contact me if you have any questions/concerns.     Sincerely,     Milan Peace MD

## 2023-12-14 NOTE — PATIENT INSTRUCTIONS
Schedule the following in September 2024 :    Fasting lab appt - fast for 12 hours prior  Echocardiogram (ultrasound of heart)    You will get a scheduling reminder in advance.     Nitroglycerin tabs sent to pharmacy.

## 2023-12-14 NOTE — PROGRESS NOTES
December 14, 2023      HPI:   I had the privilege to evaluate and examine Ms Janine Cornell, who is a 58 yr -American patient with DM2, Hypothyroidism, PCOS, Dyslipidemia, HTN, and CAD S/P NSTEMI s/p KATHERINE to the mLAD in 2011 and RCA in 2016.       Patient has a positive RA factor and fibromyalgia and follows with Dr Rodriguez for the effect of plaquenil on her retina.    She was last seen in clinic in June 2023. Since then, she continues to have dyspnea with exertion. She has dizziness when she is standing for too long. She continues to have numbness in her feet. She has taken her prescribed nitroglycerin three times (with appropriate decrease in her chest pain). She also reports feeling diffuse swelling in her hands legs and feet.     PAST MEDICAL HISTORY:  Past Medical History:   Diagnosis Date    Abnormal glandular Papanicolaou smear of cervix 1992    Allergic rhinitis     Allergy, airborne subst    Arthritis     ASCVD (arteriosclerotic cardiovascular disease)     Chronic pain     Chronic pancreatitis (H)     idiopathic, Tx: PPI, antioxidants, pancreatic enzymes    Common migraine     Coronary artery disease     Costochondritis     Difficulty in walking(719.7)     Dyspnea on exertion     Ectasia, mammary duct     [facility], [facility] n[facility] discharge    Elevated fasting glucose     Gastro-oesophageal reflux disease     Granulomatosis with polyangiitis (H)     Hernia     History of angina     Hyperlipidemia LDL goal < 100     Hypertension goal BP (blood pressure) < 140/90     Essential hypertension    Iron deficiency anemia     Ischemic cardiomyopathy     Menorrhagia     Migraine headaches     Mild persistent asthma     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 Matias, neg w/u, Rx'd w/prednisone    Ventral hernia, unspecified, without mention of obstruction or gangrene 07/2012       CURRENT MEDICATIONS:  Current Outpatient Medications   Medication Sig Dispense Refill    acetaminophen (TYLENOL) 325 MG tablet Take 1-2 tablets (325-650 mg) by mouth every 6 hours as needed for pain (headache) 250 tablet 4    ADVAIR DISKUS 250-50 MCG/ACT inhaler Inhale 1 puff into the lungs 2 times daily      albuterol (2.5 MG/3ML) 0.083% neb solution INL 1 VIAL VIA NEBULIZATION Q 4 TO 6 HOURS PRN  1    albuterol (PROAIR HFA, PROVENTIL HFA, VENTOLIN HFA) 108 (90 BASE) MCG/ACT inhaler Inhale 2 puffs into the lungs every 6 hours as needed for shortness of breath / dyspnea or wheezing 3 Inhaler 1    BANOPHEN 25 MG tablet Take 25 mg by mouth At Bedtime      blood glucose (NO BRAND SPECIFIED) lancets standard Use to test blood sugar 1-4 times daily or as directed. 400 each 3    blood glucose (NO BRAND SPECIFIED) test strip Use to test blood sugar 1-4 times daily or as directed. 400 strip 3    Blood Pressure Monitor KIT 1 each daily Monitor home blood pressure as instructed by physician.  Dispense [facility] blood pressure kit. 1 kit 0    calcium carbonate (TUMS) 500 MG chewable tablet Take 3-4 tablets (1,500-2,000 mg) by mouth daily as needed 90 tablet 3    clopidogrel (PLAVIX) 75 MG tablet Take 1 tablet (75 mg) by mouth daily . 90 tablet 3    clotrimazole (MYCELEX) 10 MG lozenge Place 1 lozenge (10 mg) inside cheek 5 times daily 50 lozenge 1    cyclobenzaprine (FLEXERIL) 10 MG tablet  Take 1 tablet (10 mg) by mouth 2 times daily as needed for muscle spasms 60 tablet 3    dicyclomine (BENTYL) 20 MG tablet TAKE 1 TABLET(20 MG) BY MOUTH FOUR TIMES DAILY AS NEEDED. (Patient not taking: Reported on 9/19/2023) 60 tablet 0    empagliflozin (JARDIANCE) 10 MG TABS tablet Take 1 tablet (10 mg) by mouth daily 30 tablet 3    EPIPEN 2-RIKY 0.3 MG/0.3ML injection INJECT 0.3 MG INTO THE MUSCLE PRF ANAPHYALAXIS  0    esomeprazole (NEXIUM) 40 MG DR capsule Take 1 capsule (40 mg) by mouth 2 times daily Take 30-60 minutes before eating. (Patient taking differently: Take 40 mg by mouth as needed Take 30-60 minutes before eating.) 180 capsule 0    estradiol (VAGIFEM) 10 MCG TABS vaginal tablet Place 1 tablet (10 mcg) vaginally twice a week 24 tablet 3    fluticasone (FLONASE) 50 MCG/ACT nasal spray Spray 1 spray in nostril as needed      folic acid (FOLVITE) 1 MG tablet Take 1 tablet (1 mg) by mouth daily 90 tablet 0    hydrocortisone (CORTAID) 1 % external cream Apply topically 2 times daily (Patient taking differently: Apply topically as needed) 60 g 1    insulin glargine (LANTUS PEN) 100 UNIT/ML pen Inject 55 Units Subcutaneous every morning Or as directed. 75 mL 3    insulin pen needle (BD PEN NEEDLE MARCK 2ND GEN) 32G X 4 MM miscellaneous Use one pen needle daily or as directed. 100 each 3    ketoconazole (NIZORAL) 2 % shampoo Apply topically daily as needed      levocetirizine (XYZAL) 5 MG tablet Take 5 mg by mouth as needed      lisinopril-hydrochlorothiazide (ZESTORETIC) 20-25 MG tablet Take 1 tablet by mouth daily 90 tablet 3    magnesium 250 MG tablet TAKE 1 TABLET(250 MG) BY MOUTH TWICE DAILY 60 tablet 3    metFORMIN (GLUCOPHAGE XR) 500 MG 24 hr tablet Take 4 tablets (2,000 mg) by mouth daily (with dinner) 360 tablet 3    methylPREDNISolone (MEDROL DOSEPAK) 4 MG tablet therapy pack Follow Package Directions 21 tablet 0    metoprolol succinate ER (TOPROL XL) 100 MG 24 hr tablet Take 1 tablet (100 mg) by mouth  daily 90 tablet 3    Multiple Vitamin (TAB-A-GERALD) TABS Take 1 tablet by mouth daily (Patient taking differently: Take 1 tablet by mouth at bedtime) 90 tablet 1    nitroGLYcerin (NITROSTAT) 0.4 MG sublingual tablet Place 1 tablet (0.4 mg) under the tongue every 5 minutes as needed for chest pain . After 3 doses, if pain persists call 911. 25 tablet 3    olopatadine (PATANOL) 0.1 % ophthalmic solution Place 1 drop into both eyes as needed      pravastatin (PRAVACHOL) 40 MG tablet Take 1 tablet (40 mg) by mouth daily 90 tablet 3    prochlorperazine (COMPAZINE) 5 MG tablet Take 1 tablet (5 mg) by mouth every 6 hours as needed for nausea or vomiting 60 tablet 3    sennosides (SENOKOT) 8.6 MG tablet 1-2 tabs a day as needed for constipation 60 tablet 1    spironolactone (ALDACTONE) 25 MG tablet Take 1 tablet (25 mg) by mouth daily. May also take 0.5 tablets (12.5 mg) daily as needed (for weight gain). 45 tablet 9    sucralfate (CARAFATE) 1 GM tablet Take 1 tablet (1 g) by mouth 4 times daily 120 tablet 3    terbinafine (LAMISIL) 1 % external cream Apply topically 2 times daily (Patient taking differently: Apply topically as needed) 42 g 1    tiZANidine (ZANAFLEX) 4 MG tablet Take 1 tablet (4 mg) by mouth 3 times daily as needed for muscle spasms 21 tablet 3    traMADol (ULTRAM) 50 MG tablet TAKE 1 TABLET(50 MG) BY MOUTH EVERY 8 HOURS AS NEEDED FOR SEVERE PAIN 60 tablet 3    vitamin D2 (ERGOCALCIFEROL) 55309 units (1250 mcg) capsule Take 1 capsule (50,000 Units) by mouth every 7 days 4 capsule 0       PAST SURGICAL HISTORY:  Past Surgical History:   Procedure Laterality Date    C/SECTION, LOW TRANSVERSE  1996        CARDIAC SURGERY      cardiac stent- recent in 16; 4 other stents    COLONOSCOPY N/A 2023    Procedure: COLONOSCOPY, WITH POLYPECTOMY;  Surgeon: Percy Gross MD;  Location: [facility] OR    DILATION AND CURETTAGE N/A 2016    Procedure: DILATION AND CURETTAGE;  Surgeon:  Nahed Butler MD;  [facility]    ENDOMETRIAL SAMPLING (BIOPSY) N/A 4/28/2023    Procedure: ENDOMETRIAL BIOPSY;  Surgeon: Nahed Butler MD;  [facility]    ESOPHAGOSCOPY, GASTROSCOPY, DUODENOSCOPY (EGD), COMBINED N/A 03/31/2022    Procedure: ESOPHAGOGASTRODUODENOSCOPY, WITH BIOPSY;  Surgeon: Enzo Sesay MD;  Location: [facility]    ESOPHAGOSCOPY, GASTROSCOPY, DUODENOSCOPY (EGD), COMBINED N/A 5/25/2023    Procedure: ESOPHAGOGASTRODUODENOSCOPY, WITH BIOPSY;  Surgeon: Percy Gross MD;  Location: [facility] OR    EXAM UNDER ANESTHESIA PELVIC N/A 4/28/2023    Procedure: EXAM UNDER ANESTHESIA;  Surgeon: Nahed Butler MD;  [facility]    HC UGI ENDOSCOPY W EUS  11/07/2008    Dr. Pastrana, possible chronic pancreatitis, fatty liver    [place]IA [place]PA[place] [facility] [place] INTRAUTERINE DEVICE N/A 07/06/2016    Procedure: INSERT INTRAUTERINE DEVICE;  Surgeon: Nahed Butler MD;  [facility]    INT UTERINE FIBRIOD EMBOLIZATION  10/29/2014    LAPAROSCOPIC CHOLECYSTECTOMY  05/28/2008    Dr. Arnol GRUBBS TX, CERVICAL  1992    s/p LEEP, done at [facility] in [place].    ORBITOTOMY Right 03/15/2016    Procedure: ORBITOTOMY;  Surgeon: Myron Cyr MD;  Location: [facility]    ORBITOTOMY Right 08/04/2017    Procedure: ORBITOTOMY;  RIGHT ORBITOTOMY AND BIOPSY;  Surgeon: Charis Holbrook MD;  Location: [facility]    REMOVE INTRAUTERINE DEVICE N/A 4/28/2023    Procedure: Remove intrauterine device,;  Surgeon: Nahed Butler MD;  [facility]    REPAIR PTOSIS Right 11/17/2017    Procedure: REPAIR PTOSIS;  RIGHT UPPER LID PTOSIS REPAIR;  Surgeon: Myron Cyr MD;  Location: [facility]    UPPER GI ENDOSCOPY  10/21/2008    mild gastritis, Dr. Rocky [facility] ECHO HEART XTHORACIC,COMPLETE, W/O DOPPLER  02/04/2004    [facility] WNL, EF 60%       ALLERGIES     Allergies   Allergen Reactions    Amoxicillin-Pot Clavulanate      Unknown possible hives and  edema    Amoxicillin-Pot Clavulanate     Artificial Sweetner (Informational Only)  Other (See Comments)     Increased headache    Azithromycin     Clavulanic Acid     Diatrizoate Other (See Comments)     Pt wants this listed because she is allergic to shellfish     Imitrex [Triptans]      Severe face/neck/chest tightness and flushing side effects     Penicillins Hives     Unknown     Pork Allergy      Stomach pain, cramping, diarrhea, itching, nausea and headaches    Shellfish Allergy Hives and Swelling    Shellfish-Derived Products     Sulfa Antibiotics Hives and Swelling    Sulfatolamide     Zithromax [Azithromycin Dihydrate] Swelling     Unknown        FAMILY HISTORY:  Family History   Problem Relation Age of Onset    Hypertension Mother     Arthritis Mother     Heart Disease Mother         long qt syndrome    Thyroid Disease Mother     C.A.D. Mother     Heart Disease Father 50        heart attack    Cerebrovascular Disease Father     Diabetes Father     Hypertension Father     Depression Father     C.A.D. Father     Neurologic Disorder Sister         migraines    Neurologic Disorder Sister         migraines    Heart Disease Brother 15        MI at 15, 16.     Arthritis Brother         he passed away, had severe arthritis at age 11    C.A.D. Brother     Anesthesia Reaction Son         PONV    Respiratory Son         asthma    Hypertension Maternal Aunt     Diabetes Maternal Uncle     Hypertension Maternal Uncle     Arthritis Maternal Uncle     Eye Disorder Maternal Uncle         cataracts    Cerebrovascular Disease Maternal Uncle     Family History Negative Other         neg for RA, SLE    Unknown/Adopted No family hx of         unknown neurological issues in her family, mother was adopted    Skin Cancer No family hx of         No known family hx of skin cancer    Deep Vein Thrombosis (DVT) No family hx of        SOCIAL HISTORY:  Social History     Socioeconomic History    Marital status: Single      Spouse name: None    Number of children: 1    Years of education: None    Highest education level: None   Occupational History     Employer: NONE    Tobacco Use    Smoking status: Current Some Day Smoker     Packs/day: 0.20     Years: 1.00     Pack years: 0.20     Types: Cigarettes     Last attempt to quit: 2016     Years since quittin.4    Smokeless tobacco: Never Used   Substance and Sexual Activity    Alcohol use: No     Alcohol/week: 0.0 standard drinks    Drug use: No    Sexual activity: Not Currently   Other Topics Concern    Parent/sibling w/ CABG, MI or angioplasty before 65F 55M? Yes   Social History Narrative    Balanced Diet - sometimes    Osteoporosis Prevention Measures - Dairy servings per day: 2 servings weekly    Regular Exercise -  Yes Describe walking 4 times a week    Dental Exam - NO    Seatbelts used - Yes    Self Breast Exam - Yes    Abuse: Current or Past (Physical, Sexual or Emotional)- No    Do you have any concerns about STD's -  No    Do you feel safe in your environment - No    Guns stored in the home - No    Sunscreen used - Yes    Lipids -  YES - Date:     Glucose -  YES - Date:     Eye Exam - YES - Date: one year ago    Colon Cancer Screening - No    Hemoccults - NO    Pap Test -  YES - Date: , remote history of LEEP    Mammography - YES - Date: last spring, would like to discuss, needs [facility] [place]    DEXA - NO    Immunizations reviewed and up to date - Yes, last td given in  or        ROS:   Constitutional: No fever, chills, or sweats. No weight gain/loss   ENT: No visual disturbance, ear ache, epistaxis, sore throat  Allergies/Immunologic: Negative.   Respiratory: No cough, hemoptysia  Cardiovascular: As per HPI  GI: No nausea, vomiting, hematemesis, melena, or hematochezia  : No urinary frequency, dysuria, or hematuria  Integument: Negative  Psychiatric: Negative  Neuro: Negative  Endocrinology: Negative  "  Musculoskeletal: Negative    EXAM:    Encounter date  Last reading 12/14/23  2:34 PM 10/18/23  2:06 PM 9/6/23  3:09 PM 9/6/23  1:49 PM 8/1/23  2:39 PM   /73 128/85 115/81 115/81 120/82   Pulse 73 75 84 84 69   Height -- -- 1.575 m (5' 2\") 1.575 m (5' 2.01\") --   Weight 76.2 kg (168 lb)         /72 at the  end of visit  In general, the patient is a pleasant female in no apparent distress.    HEENT: NC/AT.  PERRLA.  EOMI.  Sclerae white, not injected.  Nares clear.  Pharynx without erythema or exudate.  Dentition intact.    Neck: No adenopathy.  No thyromegaly. Carotids +4/4 bilaterally without bruits.  No jugular venous distension.   Heart: RRR. Normal S1, S2 splits physiologically. No murmur, rub, click, or gallop. The PMI is in the 5th ICS in the midclavicular line. There is no heave.    Lungs: CTA.  No ronchi, wheezes, rales.  No dullness to percussion.   Abdomen: Soft, nontender, nondistended. No organomegaly.  No bruits.   Extremities: No clubbing, cyanosis, or edema.  The pulses are +4/4 at the radial, brachial, femoral, popliteal, DP, and PT sites bilaterally.  No bruits are noted.  Neurologic: Alert and oriented to person/place/time, normal speech, gait and affect  Skin: No petechiae, purpura or rash.    Labs:  LIPID RESULTS:  Lab Results   Component Value Date    CHOL 161 06/29/2023    CHOL 102 06/18/2020    HDL 42 (L) 06/29/2023    HDL 30 (L) 06/18/2020    LDL 89 06/29/2023    LDL 97 06/29/2023    LDL 50 06/18/2020    TRIG 148 06/29/2023    TRIG 104 06/18/2020    CHOLHDLRATIO 3.5 07/29/2015    NHDL 119 06/29/2023    NHDL 71 06/18/2020       LIVER ENZYME RESULTS:  Lab Results   Component Value Date    AST 24 09/19/2023    AST 20 05/28/2021    ALT 25 09/19/2023    ALT 28 05/28/2021       CBC RESULTS:  Lab Results   Component Value Date    WBC 13.7 (H) 09/19/2023    WBC 9.0 05/28/2021    RBC 5.16 09/19/2023    RBC 4.74 05/28/2021    HGB 13.2 09/19/2023    HGB 12.5 05/28/2021    HCT 40.3 " 09/19/2023    HCT 36.7 05/28/2021    MCV 78 09/19/2023    MCV 77 (L) 05/28/2021    MCH 25.6 (L) 09/19/2023    MCH 26.4 (L) 05/28/2021    MCHC 32.8 09/19/2023    MCHC 34.1 05/28/2021    RDW 14.5 09/19/2023    RDW 12.8 05/28/2021     09/19/2023     05/28/2021       BMP RESULTS:  Lab Results   Component Value Date     06/29/2023     05/28/2021    POTASSIUM 3.5 06/29/2023    POTASSIUM 3.9 07/08/2022    POTASSIUM 3.5 05/28/2021    CHLORIDE 97 (L) 06/29/2023    CHLORIDE 108 07/08/2022    CHLORIDE 106 05/28/2021    CO2 26 06/29/2023    CO2 25 07/08/2022    CO2 27 05/28/2021    ANIONGAP 14 06/29/2023    ANIONGAP 10 07/08/2022    ANIONGAP 5 05/28/2021    GLC 87 06/29/2023     (H) 05/25/2023     (H) 07/08/2022     (H) 05/28/2021    BUN 22.7 (H) 06/29/2023    BUN 17 07/08/2022    BUN 12 05/28/2021    CR 1.06 (H) 09/19/2023    CR 1.00 05/28/2021    GFRESTIMATED 61 09/19/2023    GFRESTIMATED 64 05/28/2021    GFRESTBLACK 74 05/28/2021    KATYH 10.1 (H) 06/29/2023    KATHY 9.4 05/28/2021        A1C RESULTS:  Lab Results   Component Value Date    A1C 9.5 (H) 06/21/2023    A1C 8.0 (H) 06/18/2020       INR RESULTS:  Lab Results   Component Value Date    INR 0.97 08/25/2016    INR 0.98 05/20/2015     Imaging Echocardiogram 06-29-23    Global and regional left ventricular function is normal with an EF of 55-60%.  Global right ventricular function is normal. The right ventricle is normal  size.  No significant valvular abnormalities.  IVC diameter <2.1 cm collapsing >50% with sniff suggests a normal RA pressure  of 3 mmHg.  This study was compared with the study from 05/25/2021. No significant changes  noted.  ______________________________________________________________________________  Left Ventricle  Global and regional left ventricular function is normal with an EF of 55-60%.  Left ventricular size is normal. Relative wall thickness is increased  consistent with concentric remodeling. Left  ventricular diastolic function is  indeterminate.     Right Ventricle  Global right ventricular function is normal. The right ventricle is normal  size.     Atria  Both atria appear normal.     Mitral Valve  Mild mitral annular calcification is present. Trace mitral insufficiency is  present.     Aortic Valve  The valve leaflets are not well visualized. On Doppler interrogation, there is  no significant stenosis or regurgitation.     Tricuspid Valve  The valve leaflets are not well visualized. Trace tricuspid insufficiency is  present. Pulmonary artery systolic pressure cannot be assessed.     Pulmonic Valve  The valve leaflets are not well visualized. Trace pulmonic insufficiency is  present.     Vessels  Sinuses of Valsalva 3.1 cm. Ascending aorta 2.9 cm. IVC diameter <2.1 cm  collapsing >50% with sniff suggests a normal RA pressure of 3 mmHg.     Pericardium  No pericardial effusion is present.     Compared to Previous Study  This study was compared with the study from 2021 . No significant  changes noted.  ______________________________________________________________________________  MMode/2D Measurements & Calculations     IVSd: 1.0 cm  LVIDd: 3.9 cm  LVIDs: 2.5 cm  LVPWd: 1.0 cm  FS: 35.7 %  LV mass(C)d: 125.6 grams  LV mass(C)dI: 71.3 grams/m2  Ao root diam: 3.1 cm  asc Aorta Diam: 2.9 cm  LVOT diam: 2.0 cm  LVOT area: 3.1 cm2  LA Volume (BP): 34.4 ml  LA Volume Index (BP): 19.5 ml/m2  RV Base: 2.4 cm     RWT: 0.51  TAPSE: 2.0 cm     Doppler Measurements & Calculations  MV E max clay: 55.3 cm/sec  MV A max clay: 86.5 cm/sec  MV E/A: 0.64  MV dec time: 0.18 sec  LV V1 max PG: 3.9 mmHg  LV V1 max: 99.2 cm/sec  LV V1 VTI: 19.5 cm  SV(LVOT): 59.8 ml  SI(LVOT): 33.9 ml/m2  E/E' av.1  Lateral E/e': 10.4  Medial E/e': 13.7  RV S Clay: 13.5 cm/sec           Assessment and Plan:     I discussed the results with patient:     # Dyslipidemia  # HTN  # CAD S/P NSTEMI s/p KATHERINE to the mLAD in  and RCA in   #  DM2  # Hypothyroidism  # PCOS  # RA  # Fibromyalgia     Low suspicion for cardiac etiology for her profound fatigue and exertional dyspnea.  Transthoracic echocardiogram today shows normal biventricular function with no significant valvular abnormalities.  Blood pressure is well controlled on her current regimen.    We discussed the results with patient.  We discussed the importance of a heart healthy diet and lifestyle.  We discussed following points with patient:       Follow the American Heart Association Diet and Lifestyle Recommendations:  -Limit saturated fat, trans fat, sodium, red meat, sweets and sugar-sweetened beverages. If you choose to eat red meat, compare labels and select the leanest cuts available    Medication Changes: None     Follow Up: In 1 year with fasting labs and echocardiogram prior    Discussed with Dr. Kobi Morales MD, PhD  Cardiology Fellow  Click to text page [phone]      I saw and evaluated patient with CV fellow. I examined patient with CV fellow. I discussed the results with patient and CV fellow. I discussed our plan with patient and CV fellow.  I agree with CV fellow's note and I edited the CV fellow's note to make it a more comprehensive document.    Milan Peace MD, PhD  Professor of Medicine  Division of Cardiology

## 2023-12-15 LAB — LDLC SERPL DIRECT ASSAY-MCNC: 94 MG/DL

## 2023-12-21 ENCOUNTER — OFFICE VISIT (OUTPATIENT)
Dept: FAMILY MEDICINE | Facility: CLINIC | Age: 58
End: 2023-12-21
Payer: COMMERCIAL

## 2023-12-21 VITALS
DIASTOLIC BLOOD PRESSURE: 71 MMHG | WEIGHT: 166.7 LBS | HEART RATE: 75 BPM | OXYGEN SATURATION: 96 % | SYSTOLIC BLOOD PRESSURE: 104 MMHG | BODY MASS INDEX: 30.49 KG/M2 | TEMPERATURE: 98.7 F

## 2023-12-21 DIAGNOSIS — K26.3 DUODENAL ULCER, ACUTE: ICD-10-CM

## 2023-12-21 DIAGNOSIS — K58.8 OTHER IRRITABLE BOWEL SYNDROME: Primary | ICD-10-CM

## 2023-12-21 DIAGNOSIS — K26.9 DUODENAL ULCER WITHOUT HEMORRHAGE OR PERFORATION AND WITHOUT OBSTRUCTION: ICD-10-CM

## 2023-12-21 DIAGNOSIS — R11.0 NAUSEA: ICD-10-CM

## 2023-12-21 DIAGNOSIS — M19.90 INFLAMMATORY ARTHRITIS: ICD-10-CM

## 2023-12-21 DIAGNOSIS — R68.81 EARLY SATIETY: ICD-10-CM

## 2023-12-21 PROCEDURE — 99214 OFFICE O/P EST MOD 30 MIN: CPT | Performed by: FAMILY MEDICINE

## 2023-12-21 PROCEDURE — 99207 E-CONSULT TO GASTROENTEROLOGY (ADULT OUTPT PROVIDER TO SPECIALIST WRITTEN QUESTION & RESPONSE): CPT | Performed by: FAMILY MEDICINE

## 2023-12-21 RX ORDER — ESOMEPRAZOLE MAGNESIUM 40 MG/1
40 CAPSULE, DELAYED RELEASE ORAL 2 TIMES DAILY
Qty: 180 CAPSULE | Refills: 0 | Status: SHIPPED | OUTPATIENT
Start: 2023-12-21

## 2023-12-21 RX ORDER — DICYCLOMINE HCL 20 MG
TABLET ORAL
Qty: 60 TABLET | Refills: 4 | Status: SHIPPED | OUTPATIENT
Start: 2023-12-21

## 2023-12-21 RX ORDER — MULTIVITAMIN WITH FOLIC ACID 400 MCG
1 TABLET ORAL DAILY
Qty: 90 TABLET | Refills: 1 | Status: SHIPPED | OUTPATIENT
Start: 2023-12-21 | End: 2024-07-08

## 2023-12-21 RX ORDER — SUCRALFATE 1 G/1
1 TABLET ORAL 4 TIMES DAILY
Qty: 120 TABLET | Refills: 3 | Status: SHIPPED | OUTPATIENT
Start: 2023-12-21

## 2023-12-21 NOTE — LETTER
Janine Cornell  [address] [facility] [address]  : 1965  MRN:  [id number]      2023          To whom it may concern:    Janine is my patient. Due to multiple chronic medical issues, she could not participate in jury duty, she would be in pain and unable to concentrate or focus. Please excuse her.    Kash Solano MD

## 2023-12-21 NOTE — PROGRESS NOTES
Janine is a 58 year old that presents in clinic today for the following:     Chief Complaint   Patient presents with    Follow Up     Pt has updates from diabetes and neurology visits    Gastrointestinal Problem     Pt reports no improvement. BM several times a day, nausea, pain,     Recheck Medication     Pt has questions about NORTRIPTYLINE side effects               12/21/2023     4:19 PM   Additional Questions   Roomed by ANJUM Castaneda from encounters over the past 10 days    No data recorded       Dallas Brown at 4:38 PM on 12/21/2023

## 2023-12-22 ENCOUNTER — TELEPHONE (OUTPATIENT)
Dept: INTERNAL MEDICINE | Facility: CLINIC | Age: 58
End: 2023-12-22

## 2023-12-22 ENCOUNTER — E-CONSULT (OUTPATIENT)
Dept: GASTROENTEROLOGY | Facility: CLINIC | Age: 58
End: 2023-12-22
Payer: COMMERCIAL

## 2023-12-22 DIAGNOSIS — R11.0 NAUSEA: Primary | ICD-10-CM

## 2023-12-22 DIAGNOSIS — R68.81 EARLY SATIETY: ICD-10-CM

## 2023-12-22 PROCEDURE — 99207 PR NO CHARGE LOS: CPT | Performed by: INTERNAL MEDICINE

## 2023-12-22 NOTE — PROGRESS NOTES
12/22/2023     E-Consult has been denied due to: Complexity of question, needs in-person referral.    Interprofessional consultation requested by:  Kash Solano MD      Clinical Question/Purpose: MY CLINICAL QUESTION IS: Chronic abdomen symtpoms. Seen by GI here in past. Low appetite, early satiety, stool 3-5 times a day (often post prandial, vary from normal to watery), usually nauseated, epigastric area pain. EGD/colonoscopy here earlier this year. See Dr Mckinnon notes summarizing autoimmune disease. Has DM 2, sees endocrine MD here. We are considering gastric empty (normal in past), small bowel follow through, abdomen/pancreas MRI. Robert does see him to help sometimes. I have her using Nexium and carafate routinely, hard to say if helps. I am looking for advice on evalutation and treatment of her symptoms.     Patient assessment and information reviewed:     Given complexity of question, comorbidities and prior evaluation I would recommend a formal GI referral.    Recommendations:   -please place formal GI referral       The recommendations provided in this E-Consult are based on a review of clinical data pertinent to the clinical question presented, without a review of the patient's complete medical record or, the benefit of a comprehensive in-person or virtual patient evaluation. This consultation should not replace the clinical judgement and evaluation of the provider ordering this E-Consult. Any new clinical issues, or changes in patient status since the filing of this E-Consult will need to be taken into account when assessing these recommendations. Please contact me if you have further questions.    My total time spent reviewing clinical information and formulating assessment was 5 minutes.        Gabriel Delgado MD

## 2023-12-22 NOTE — TELEPHONE ENCOUNTER
Left a detailed message to the pt regarding the referral. She will be contacted after scheduling.    Aly-Mi  -----------------------------------------------------------------------------------------------------      Kash Solano MD   to Katy Ferrera RN   Can you let her know GI has agreed to see her in person, can you place referral for nausea and epigastric pain and early satiety? Thx

## 2023-12-23 NOTE — PROGRESS NOTES
"  Assessment & Plan         Other irritable bowel syndrome  Helps  - dicyclomine (BENTYL) 20 MG tablet; TAKE 1 TABLET(20 MG) BY MOUTH FOUR TIMES DAILY AS NEEDED.    Duodenal ulcer, acute  Cont  - esomeprazole (NEXIUM) 40 MG DR capsule; Take 1 capsule (40 mg) by mouth 2 times daily Take 30-60 minutes before eating.    Duodenal ulcer without hemorrhage or perforation and without obstruction  Same, these helps  - esomeprazole (NEXIUM) 40 MG DR capsule; Take 1 capsule (40 mg) by mouth 2 times daily Take 30-60 minutes before eating.  - sucralfate (CARAFATE) 1 GM tablet; Take 1 tablet (1 g) by mouth 4 times daily    Inflammatory arthritis  COnt  - Multiple Vitamin (TAB-A-GERALD) TABS; Take 1 tablet by mouth daily    Nausea  GI will see her in person  - Adult E-Consult to Gastroenterology (Outpt Provider to Specialist Written Question & Response)    Early satiety  Same  - Adult E-Consult to Gastroenterology (Outpt Provider to Specialist Written Question & Response)      32 minutes spent by me on the date of the encounter doing chart review, history and exam, documentation and further activities per the note    BMI:   Estimated body mass index is 30.49 kg/m  as calculated from the following:    Height as of 9/6/23: 1.575 m (5' 2\").    Weight as of this encounter: 75.6 kg (166 lb 11.2 oz).       Return in about 1 month (around 1/21/2024).    Kash Solano MD  [facility] Mehta is a 58 year old, presenting for the following health issues:  Follow Up (Pt has updates from diabetes and neurology visits), Gastrointestinal Problem (Pt reports no improvement. BM several times a day, nausea, pain, ), and Recheck Medication (Pt has questions about NORTRIPTYLINE side effects//)      12/21/2023     4:19 PM   Additional Questions   Roomed by ANJUM GILLILAND Here in follow-up  1-ongoing nausea, low appetite, early satiety, freq non bloody non melena stools often post meals, vary from " formed to watery. Gets hungry but full quickly. Pain and nausea mid abdomen.     Discussed  G Pranav MCINTYRE  MR abd    Sees Rheum routinely, notes/plans rvwd, on rituximab    Saw [facility], told PN, we will send for records    She is distressed by her sx, and their chronicity and lack of dx    Past Medical History:   Diagnosis Date    Abnormal glandular Papanicolaou smear of cervix 1992    Allergic rhinitis     Allergy, airborne subst    Arthritis     ASCVD (arteriosclerotic cardiovascular disease)     Chronic pain     Chronic pancreatitis (H)     idiopathic, Tx: PPI, antioxidants, pancreatic enzymes    Common migraine     Coronary artery disease     Costochondritis     Difficulty in walking(719.7)     Dyspnea on exertion     Ectasia, mammary duct     [facility], persistent nipple discharge    Elevated fasting glucose     Gastro-oesophageal reflux disease     Granulomatosis with polyangiitis (H)     Hernia     History of angina     Hyperlipidemia LDL goal < 100     Hypertension goal BP (blood pressure) < 140/90     Essential hypertension    Iron deficiency anemia     Ischemic cardiomyopathy     Menorrhagia     Migraine headaches     Mild persistent asthma     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 Matias, neg w/u, Rx'd w/prednisone    Ventral hernia,  unspecified, without mention of obstruction or gangrene 2012     Past Surgical History:   Procedure Laterality Date    C/SECTION, LOW TRANSVERSE  1996        CARDIAC SURGERY      cardiac stent- recent in 16; 4 other stents    COLONOSCOPY N/A 2023    Procedure: COLONOSCOPY, WITH POLYPECTOMY;  Surgeon: Percy Gross MD;  Location: [facility] OR    DILATION AND CURETTAGE N/A 2016    Procedure: DILATION AND CURETTAGE;  Surgeon: Nahed Butler MD;  [facility]    ENDOMETRIAL SAMPLING (BIOPSY) N/A 2023    Procedure: ENDOMETRIAL BIOPSY;  Surgeon: Nahed Butler MD;  [facility]    ESOPHAGOSCOPY, GASTROSCOPY, DUODENOSCOPY (EGD), COMBINED N/A 2022    Procedure: ESOPHAGOGASTRODUODENOSCOPY, WITH BIOPSY;  Surgeon: Enzo Sesay MD;  Location: [facility]    ESOPHAGOSCOPY, GASTROSCOPY, DUODENOSCOPY (EGD), COMBINED N/A 2023    Procedure: ESOPHAGOGASTRODUODENOSCOPY, WITH BIOPSY;  Surgeon: Percy Gross MD;  Location: [facility] OR    EXAM UNDER ANESTHESIA PELVIC N/A 2023    Procedure: EXAM UNDER ANESTHESIA;  Surgeon: Nahed Butler MD;  [facility]    HC UGI ENDOSCOPY W EUS  2008    Dr. Pastrana, possible chronic pancreatitis, fatty liver    HERNIA REPAIR  2012    done at [facility] [place]    INSERT INTRAUTERINE DEVICE N/A 2016    Procedure: INSERT INTRAUTERINE DEVICE;  Surgeon: Nahed Butler MD;  [facility]    INT UTERINE FIBRIOD EMBOLIZATION  10/29/2014    LAPAROSCOPIC CHOLECYSTECTOMY  2008    Dr. Arnol GRUBBS TX, CERVICAL  1992    s/p LEEP, done at [facility] in [place].    ORBITOTOMY Right 03/15/2016    Procedure: ORBITOTOMY;  Surgeon: Myron Cyr MD;  Location: [facility]    ORBITOTOMY Right 2017    Procedure: ORBITOTOMY;  RIGHT ORBITOTOMY AND BIOPSY;  Surgeon: Charis Holbrook MD;  [facility]    REMOVE INTRAUTERINE DEVICE N/A 2023    Procedure: Remove intrauterine device,;   Surgeon: Nahed Butler MD;  [facility]    REPAIR PTOSIS Right 11/17/2017    Procedure: REPAIR PTOSIS;  RIGHT UPPER LID PTOSIS REPAIR;  Surgeon: Myron Cyr MD;  Location: [facility]    UPPER GI ENDOSCOPY  10/21/2008    mild gastritis, Dr. Hidalgo    [facility] ECHO HEART XTHORACIC,COMPLETE, W/O DOPPLER  02/04/2004    [facility] WNL, EF 60%     Current Outpatient Medications   Medication    acetaminophen (TYLENOL) 325 MG tablet    ADVAIR DISKUS 250-50 MCG/ACT inhaler    albuterol (2.5 MG/3ML) 0.083% neb solution    albuterol (PROAIR HFA, PROVENTIL HFA, VENTOLIN HFA) 108 (90 BASE) MCG/ACT inhaler    BANOPHEN 25 MG tablet    blood glucose (NO BRAND SPECIFIED) lancets standard    blood glucose (NO BRAND SPECIFIED) test strip    Blood Pressure Monitor KIT    calcium carbonate (TUMS) 500 MG chewable tablet    clopidogrel (PLAVIX) 75 MG tablet    clotrimazole (MYCELEX) 10 MG lozenge    cyclobenzaprine (FLEXERIL) 10 MG tablet    dicyclomine (BENTYL) 20 MG tablet    empagliflozin (JARDIANCE) 10 MG TABS tablet    EPIPEN 2-RIKY 0.3 MG/0.3ML injection    esomeprazole (NEXIUM) 40 MG DR capsule    estradiol (VAGIFEM) 10 MCG TABS vaginal tablet    fluticasone (FLONASE) 50 MCG/ACT nasal spray    folic acid (FOLVITE) 1 MG tablet    hydrocortisone (CORTAID) 1 % external cream    insulin glargine (LANTUS PEN) 100 UNIT/ML pen    insulin pen needle (BD PEN NEEDLE MARCK 2ND GEN) 32G X 4 MM miscellaneous    ketoconazole (NIZORAL) 2 % shampoo    levocetirizine (XYZAL) 5 MG tablet    lisinopril-hydrochlorothiazide (ZESTORETIC) 20-25 MG tablet    magnesium 250 MG tablet    metFORMIN (GLUCOPHAGE XR) 500 MG 24 hr tablet    metoprolol succinate ER (TOPROL XL) 100 MG 24 hr tablet    Multiple Vitamin (TAB-A-GERALD) TABS    nitroGLYcerin (NITROSTAT) 0.4 MG sublingual tablet    nitroGLYcerin (NITROSTAT) 0.4 MG sublingual tablet    olopatadine (PATANOL) 0.1 % ophthalmic solution    pravastatin (PRAVACHOL) 40 MG tablet    prochlorperazine  (COMPAZINE) 5 MG tablet    sennosides (SENOKOT) 8.6 MG tablet    spironolactone (ALDACTONE) 25 MG tablet    sucralfate (CARAFATE) 1 GM tablet    terbinafine (LAMISIL) 1 % external cream    tiZANidine (ZANAFLEX) 4 MG tablet    traMADol (ULTRAM) 50 MG tablet    vitamin D2 (ERGOCALCIFEROL) 17691 units (1250 mcg) capsule    methylPREDNISolone (MEDROL DOSEPAK) 4 MG tablet therapy pack     No current facility-administered medications for this visit.     Allergies   Allergen Reactions    Amoxicillin-Pot Clavulanate      Unknown possible hives and edema    Amoxicillin-Pot Clavulanate     Artificial Sweetner (Informational Only)  Other (See Comments)     Increased headache    Azithromycin     Clavulanic Acid     Diatrizoate Other (See Comments)     Pt wants this listed because she is allergic to shellfish     Imitrex [Triptans]      Severe face/neck/chest tightness and flushing side effects     Penicillins Hives     Unknown     Pork Allergy      Stomach pain, cramping, diarrhea, itching, nausea and headaches    Shellfish Allergy Hives and Swelling    Shellfish-Derived Products     Sulfa Antibiotics Hives and Swelling    Sulfatolamide     Zithromax [Azithromycin Dihydrate] Swelling     Unknown              Review of Systems         Objective    /71 (BP Location: Right arm, Patient Position: Sitting, Cuff Size: Adult Regular)   Pulse 75   Temp 98.7  F (37.1  C) (Oral)   Wt 75.6 kg (166 lb 11.2 oz)   SpO2 96%   BMI 30.49 kg/m    Body mass index is 30.49 kg/m .  Physical Exam   GENERAL: healthy, alert and no distress  ABDOMEN: soft, nontender, no hepatosplenomegaly, no masses and bowel sounds normal  MS: no gross musculoskeletal defects noted, no edema

## 2023-12-26 ENCOUNTER — TELEPHONE (OUTPATIENT)
Dept: GASTROENTEROLOGY | Facility: CLINIC | Age: 58
End: 2023-12-26
Payer: COMMERCIAL

## 2023-12-26 NOTE — TELEPHONE ENCOUNTER
Charlyr received a message from Dr. Gabriel Delgado to help get Pt scheduled for a new Gen GI appointment in the next 1-2 months (Jan-Feb) with an MD or CECI. Charlyr called and left message, along with call back number for the Pt.

## 2023-12-28 ENCOUNTER — TELEPHONE (OUTPATIENT)
Dept: GASTROENTEROLOGY | Facility: CLINIC | Age: 58
End: 2023-12-28
Payer: COMMERCIAL

## 2023-12-28 NOTE — TELEPHONE ENCOUNTER
Left Voicemail (2nd Attempt) for the patient to call back and schedule the following:    Appointment type: next available  Provider: Dr. Delgado  Return date: TBA  Specialty phone number: [phone]  Additional appointment(s) needed:   Additonal Notes:

## 2024-01-17 ENCOUNTER — VIRTUAL VISIT (OUTPATIENT)
Dept: GASTROENTEROLOGY | Facility: CLINIC | Age: 59
End: 2024-01-17
Attending: FAMILY MEDICINE
Payer: COMMERCIAL

## 2024-01-17 ENCOUNTER — TELEPHONE (OUTPATIENT)
Dept: ENDOCRINOLOGY | Facility: CLINIC | Age: 59
End: 2024-01-17

## 2024-01-17 DIAGNOSIS — K21.9 GASTROESOPHAGEAL REFLUX DISEASE, UNSPECIFIED WHETHER ESOPHAGITIS PRESENT: ICD-10-CM

## 2024-01-17 DIAGNOSIS — K86.1 CHRONIC PANCREATITIS, UNSPECIFIED PANCREATITIS TYPE (H): ICD-10-CM

## 2024-01-17 DIAGNOSIS — R10.13 CHRONIC EPIGASTRIC PAIN: Primary | ICD-10-CM

## 2024-01-17 DIAGNOSIS — R68.81 EARLY SATIETY: ICD-10-CM

## 2024-01-17 DIAGNOSIS — R11.2 NAUSEA AND VOMITING, UNSPECIFIED VOMITING TYPE: ICD-10-CM

## 2024-01-17 DIAGNOSIS — G89.29 CHRONIC EPIGASTRIC PAIN: Primary | ICD-10-CM

## 2024-01-17 DIAGNOSIS — F41.9 ANXIETY DUE TO INVASIVE PROCEDURE: ICD-10-CM

## 2024-01-17 DIAGNOSIS — Z87.19 HX OF DUODENAL ULCER: ICD-10-CM

## 2024-01-17 PROCEDURE — 99417 PROLNG OP E/M EACH 15 MIN: CPT | Mod: 95 | Performed by: PHYSICIAN ASSISTANT

## 2024-01-17 PROCEDURE — 99205 OFFICE O/P NEW HI 60 MIN: CPT | Mod: 95 | Performed by: PHYSICIAN ASSISTANT

## 2024-01-17 RX ORDER — LORAZEPAM 0.5 MG/1
TABLET ORAL
Qty: 1 TABLET | Refills: 0 | Status: SHIPPED | OUTPATIENT
Start: 2024-01-17 | End: 2024-06-05

## 2024-01-17 NOTE — NURSING NOTE
Is the patient currently in the state of [place]? YES    Visit mode:VIDEO    If the visit is dropped, the patient can be reconnected by: VIDEO VISIT: Text to cell phone:   Telephone Information:   Mobile [phone]       Will anyone else be joining the visit? NO  (If patient encounters technical issues they should call [phone])    How would you like to obtain your AVS? MyChart    Are changes needed to the allergy or medication list? No    Reason for visit: Consult    Brendan GRAY

## 2024-01-17 NOTE — PATIENT INSTRUCTIONS
It was a pleasure taking care of you today.  I've included a brief summary of our discussion and care plan from today's visit below.  Please review this information with your primary care provider.  _______________________________________________________________________     My recommendations are summarized as follows:     - schedule an MRI of pancreas and gastric emptying scan  - schedule a lab appointment   ______________________________________________________________________     How do I schedule labs, imaging studies, or procedures that were ordered in clinic today?      Labs: To schedule lab appointment you can contact your local [facility] or call [phone] to schedule at any convenient [facility] location.     Procedures: If a colonoscopy, upper endoscopy, breath test, esophageal manometry, or pH impedence was ordered today, our endoscopy team will call you to schedule this. If you have not heard from our endoscopy team within a week, please call [phone] to schedule.      Imaging Studies: If you were scheduled for a CT scan, X-ray, MRI, ultrasound, HIDA scan or other imaging study, please call [phone] to have this scheduled.      Referral: If a referral to another specialty was ordered, expect a phone call or follow instructions above. If you have not heard from anyone regarding your referral in a week, please call our clinic to check the status.      Who do I call with any questions after my visit?  Please be in touch if there are any further questions that arise following today's visit.  There are multiple ways to contact your gastroenterology care team.       During business hours, you may reach a Gastroenterology nurse at [phone]     To schedule or reschedule an appointment, please call [phone].      You can always send a secure message through [employer].  MyChart messages are answered by your nurse or doctor typically within 24 hours.  Please allow extra time on  weekends and holidays.       For urgent/emergent questions after business hours, you may reach the on-call GI Fellow by contacting the [facility]  at [phone].     How will I get the results of any tests ordered?    You will receive all of your results.  If you have signed up for [employer], any tests ordered at your visit will be available to you after your physician reviews them.  Typically this takes 1-2 weeks.  If there are urgent results that require a change in your care plan, your physician or nurse will call you to discuss the next steps.       What is [employer]?  [employer] is a secure way for you to access all of your healthcare records from the [facility].  It is a web based computer program, so you can sign on to it from any location.  It also allows you to send secure messages to your care team.  I recommend signing up for [employer] access if you have not already done so and are comfortable with using a computer.       How to I schedule a follow-up visit?  If you did not schedule a follow-up visit today, please call [phone] to schedule a follow-up office visit.      Benjamin Hyman PA-C  Division of Gastroenterology, Hepatology and Nutrition   [facility]

## 2024-01-17 NOTE — TELEPHONE ENCOUNTER
Called patient and call was answered then disconnected. Patient has an appointment on 1/19/24. Need to collect the last 14 days worth of blood sugar readings to prepare for patient's visit.     Elizabeth Polanco LPN 01/17/24 1:38 PM

## 2024-01-17 NOTE — PROGRESS NOTES
GI NEW PATIENT VISIT     CC/REFERRING MD:    Kash Solano MD    REASON FOR CONSULTATION:   Referred by Kash Solano for Consult      HPI: Janine Cornell is 58 year old female with pmhx of chronic pancreatitis and diabetes who presents for evaluation of chronic abdominal pain.     She reports having abdominal symptoms for over 20 years.  She has been to multiple providers and no one can seem to tell her what is wrong.  She has tried multiple medications without resolution.     She has an epigastric abdominal pain that can radiate to her back. She wakes up every day with nausea.  She has Zofran and Compazine as needed which temporarily help.  Pain can develop anytime she eats.  It does not seem to matter what foods however acidic foods do seem to bother her more.  Tomatoes especially.  She can have bile and mucus regurgitation that occurs more than twice a week.  She has early satiety.  At times it feels like there is a tightness or a band around her upper stomach.  She does have history of duodenal ulcer in 2022.  More recent upper endoscopy from May 2023 did note mild chronic inactive gastritis however was without ulcer disease. This was thought to be related to NSAID use which she has since stopped completely without much change. She has been on PPIs off-and-on over the years without much benefit to her chronic pain.  She was placed back on this last month.  It was prescribed as twice daily however she is only taking it once daily.  She does on occasion take a second dose.  Overall does not feel that it is helping much.  She has also used Pepcid, sucralfate as well as Bentyl without significant improvement.  She has had her weight fluctuate by 10 pounds but reports her weight as of recently has been stable.  She may on occasion of constipation however she is mostly having frequent formed bowel movements.  She has 2-6 BMs a day.  Feels like she is emptying out. She had a colonoscopy in May 2023 which  was unrevealing.  She has had stool studies last year including fecal elastase which low end of normal range.     She does have history of chronic pancreatitis.  She was seen by Dr. Julio César Marcus in 2009.  She was placed on digestive enzymes which unfortunately had pork which she cannot tolerate.  Has not been back on enzymes since then.    She was hospitalized in 2021 for acute on chronic pancreatitis.  At that time an MRI noted an abnormality at the pancreatic uncinate process which may have been related to the acute pancreatitis.  She was advised to have a EUS as an outpatient however it appears she lost follow up.     Janine  has a past medical history of Abnormal glandular Papanicolaou smear of cervix (1992), Allergic rhinitis, Arthritis, ASCVD (arteriosclerotic cardiovascular disease), Chronic pain, Chronic pancreatitis (H), Common migraine, Coronary artery disease, Costochondritis, Difficulty in walking(719.7), Dyspnea on exertion, Ectasia, mammary duct, Elevated fasting glucose, Gastro-oesophageal reflux disease, Granulomatosis with polyangiitis (H), Hernia, History of angina, Hyperlipidemia LDL goal < 100, Hypertension goal BP (blood pressure) < 140/90, Iron deficiency anemia, Ischemic cardiomyopathy, Menorrhagia, Migraine headaches, Mild persistent asthma, Neuritis optic (1997), NSTEMI (non-ST elevated myocardial infarction) (H) (11/01/2011), Numbness and tingling, Numbness of feet, Obesity, PCOS (polycystic ovarian syndrome), PONV (postoperative nausea and vomiting), S/P coronary artery stent placement (11/01/2011), Seasonal affective disorder (H24), Shortness of breath, Stented coronary artery, Type 2 diabetes, HbA1c goal < 7% (H) (06/2010), Unspecified cerebral artery occlusion with cerebral infarction, Uterine leiomyoma, 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    She  has a past surgical history that includes c/section, low  transverse (11/01/1996); leep tx, cervical (1992); ECHO HEART XTHORACIC,COMPLETE, W/O DOPPLER (02/04/2004); upper gi endoscopy (10/21/2008); UGI ENDOSCOPY W EUS (11/07/2008); Laparoscopic cholecystectomy (05/28/2008); hernia repair (12/01/2012); INT Uterine fibriod embolization (10/29/2014); Cardiac surgery; Orbitotomy (Right, 03/15/2016); Dilation and curettage (N/A, 07/06/2016); Insert intrauterine device (N/A, 07/06/2016); Orbitotomy (Right, 08/04/2017); Repair ptosis (Right, 11/17/2017); Esophagoscopy, gastroscopy, duodenoscopy (EGD), combined (N/A, 03/31/2022); Exam under anesthesia pelvic (N/A, 4/28/2023); Remove intrauterine device (N/A, 4/28/2023); Endometrial sampling (biopsy) (N/A, 4/28/2023); Colonoscopy (N/A, 5/25/2023); and Esophagoscopy, gastroscopy, duodenoscopy (EGD), combined (N/A, 5/25/2023).    She  reports that she quit smoking about 7 years ago. Her smoking use included cigarettes. She has a 0.2 pack-year smoking history. She has never used smokeless tobacco. She reports that she does not drink alcohol and does not use drugs.    Her family history includes Anesthesia Reaction in her son; Arthritis in her brother, maternal uncle, and mother; C.A.D. in her brother, father, and mother; Cerebrovascular Disease in her father and maternal uncle; Depression in her father; Diabetes in her father and maternal uncle; Eye Disorder in her maternal uncle; Family History Negative in an other family member; Heart Disease in her mother; Heart Disease (age of onset: 15) in her brother; Heart Disease (age of onset: 50) in her father; Hypertension in her father, maternal aunt, maternal uncle, and mother; Neurologic Disorder in her sister and sister; Respiratory in her son; Thyroid Disease in her mother.    ALLERGIES: Amoxicillin-pot clavulanate, Amoxicillin-pot clavulanate, Artificial sweetner (informational only) , Azithromycin, Clavulanic acid, Diatrizoate, Imitrex [triptans], Penicillins, Pork allergy,  Shellfish allergy, Shellfish-derived products, Sulfa antibiotics, Sulfatolamide, and Zithromax [azithromycin dihydrate]      Current Outpatient Medications   Medication    acetaminophen (TYLENOL) 325 MG tablet    ADVAIR DISKUS 250-50 MCG/ACT inhaler    albuterol (2.5 MG/3ML) 0.083% neb solution    albuterol (PROAIR HFA, PROVENTIL HFA, VENTOLIN HFA) 108 (90 BASE) MCG/ACT inhaler    BANOPHEN 25 MG tablet    blood glucose (NO BRAND SPECIFIED) lancets standard    blood glucose (NO BRAND SPECIFIED) test strip    Blood Pressure Monitor KIT    calcium carbonate (TUMS) 500 MG chewable tablet    clopidogrel (PLAVIX) 75 MG tablet    clotrimazole (MYCELEX) 10 MG lozenge    cyclobenzaprine (FLEXERIL) 10 MG tablet    dicyclomine (BENTYL) 20 MG tablet    empagliflozin (JARDIANCE) 10 MG TABS tablet    EPIPEN 2-RIKY 0.3 MG/0.3ML injection    esomeprazole (NEXIUM) 40 MG DR capsule    estradiol (VAGIFEM) 10 MCG TABS vaginal tablet    fluticasone (FLONASE) 50 MCG/ACT nasal spray    folic acid (FOLVITE) 1 MG tablet    hydrocortisone (CORTAID) 1 % external cream    insulin glargine (LANTUS PEN) 100 UNIT/ML pen    insulin pen needle (BD PEN NEEDLE MARCK 2ND GEN) 32G X 4 MM miscellaneous    ketoconazole (NIZORAL) 2 % shampoo    levocetirizine (XYZAL) 5 MG tablet    lisinopril-hydrochlorothiazide (ZESTORETIC) 20-25 MG tablet    magnesium 250 MG tablet    metFORMIN (GLUCOPHAGE XR) 500 MG 24 hr tablet    methylPREDNISolone (MEDROL DOSEPAK) 4 MG tablet therapy pack    metoprolol succinate ER (TOPROL XL) 100 MG 24 hr tablet    Multiple Vitamin (TAB-A-GERALD) TABS    nitroGLYcerin (NITROSTAT) 0.4 MG sublingual tablet    nitroGLYcerin (NITROSTAT) 0.4 MG sublingual tablet    olopatadine (PATANOL) 0.1 % ophthalmic solution    pravastatin (PRAVACHOL) 40 MG tablet    prochlorperazine (COMPAZINE) 5 MG tablet    sennosides (SENOKOT) 8.6 MG tablet    spironolactone (ALDACTONE) 25 MG tablet    sucralfate (CARAFATE) 1 GM tablet    terbinafine (LAMISIL) 1 %  external cream    tiZANidine (ZANAFLEX) 4 MG tablet    traMADol (ULTRAM) 50 MG tablet    vitamin D2 (ERGOCALCIFEROL) 80327 units (1250 mcg) capsule     No current facility-administered medications for this visit.      PHYSICAL EXAMINATION:  Constitutional: aaox3, cooperative, pleasant, not dyspneic/diaphoretic, no acute distress  GENERAL: alert and no distress  EYES: Eyes grossly normal to inspection.  No discharge or erythema, or obvious scleral/conjunctival abnormalities.  RESP: No audible wheeze, cough, or visible cyanosis.    SKIN: Visible skin clear. No significant rash, abnormal pigmentation or lesions.  NEURO: Cranial nerves grossly intact.  Mentation and speech appropriate for age.  PSYCH: Appropriate affect, tone, and pace of words    ASSESSMENT/PLAN:    #Chronic epigastric abdominal pain   #Chronic pancreatitis  #n/v and early satiety   #GERD  #hx of duodenal ulcer     Janine Cornell is a 58 year old female with pmhx of chronic pancreatitis and diabetes who presents for evaluation of chronic abdominal pain. Patient reports having a history of abdominal pain for over 20 years.  She develops post prandial abdominal pain, nausea, vomiting, reflux/regurgitation and early satiety. Differential includes chronic pancreatitis vs gastroparesis vs pud.     She has history of chronic pancreatitis and was seen by Dr. Julio César Marcus in 2009. MRCP with secretin at the time showed subtle evidence of chronic pancreatitis. She was given pancreatic enzymes and PPI. She however could not continue with medication because she developed significant side effects which she believes was related to pork ingredient of medication. She lost follow up thereafter. She was hospitalized in 2021 with acute on chronic pancreatitis. There was an abnormality noted on MRI pancreas at the time and she was to follow up outpatient for EUS however she lost follow up. Chronic pancreatitis could explain her chronic abd pain and post prandial n/v. She  is not interested in taking any pancreatic enzyme supplements given her pork allergy. Of note a recent fecal elastase was low end of normal range. We will plan to check MRI Pancreas.    We will check a 4 hr GES to determine if she has gastroparesis. Smaller more frequent meals and proper glycemic control are part of treatment. There may be benefit of short course of reglan or prn use if noted to have gastroparesis. Would avoid prolonged use given black box warning of EPS and TD.     Lastly, she does have hx of duodenal ulcer and reflux symptoms. Duodenal ulcer noted in 2022. Her last upper endoscopy in 2023 did note mild chronic inactive gastritis however was without ulcer disease. She was on NSAID's but has discontinued without much change in symptoms. She has taken PPI and carafate off and on over the years without significant improvement in her chronic pain. There is value in her taking nexium for reflux but it is unlikely that this would explain her chronic abd pain.     PLAN:   -- MRI pancreas to assess chronic pancreatitis. One dose of ativan 0.5 rx for procedural anxiety. Pt has tolerated in the past without difficulty.  -- consider EUS depending on MRI findings - to be reviewed by panc/biliary   -- check fat soluble vitamins  -- considered pancr replacement therapy however patient has pork allergy. Consider reviewing with MTM to see if we have options   -- consider pain mgmt referral for mgmt of chronic pain which may consist of celiac plexus block  -- 4 hr GES to r/o gastroparesis       CRC screening up to date. Completed in 2023. Unremarkable. Repeat due in 10 years.     Thank you for this consultation.  It was a pleasure to participate in the care of this patient; please contact us with any further questions.        This note was created with voice recognition software, and while reviewed for accuracy, typos may remain.       I spent a total of 105 minutes on the day of the visit.   Time spent by me doing  chart review, history and exam, documentation and further activities per the note      Benjamin Hyman PA-C  Division of Gastroenterology, Hepatology and Nutrition   [facility]            Video-Visit Details    Video Start Time: 2:36 PM    Type of service:  Video Visit    Video End Time: 3:17 PM    Originating Location (pt. Location): Home    Distant Location (provider location):  Off-site    Platform used for Video Visit: Telephone

## 2024-01-18 NOTE — TELEPHONE ENCOUNTER
Spoke with patient in regards to obtaining blood glucose data for appointment scheduled 1/19/24. Patient does not have BG readings with her and states she will probably have them at her visit time.     Elizabeth Polanco LPN 01/18/24 11:31 AM

## 2024-01-19 ENCOUNTER — VIRTUAL VISIT (OUTPATIENT)
Dept: ENDOCRINOLOGY | Facility: CLINIC | Age: 59
End: 2024-01-19
Payer: COMMERCIAL

## 2024-01-19 DIAGNOSIS — E11.9 TYPE 2 DIABETES, HBA1C GOAL < 7% (H): ICD-10-CM

## 2024-01-19 DIAGNOSIS — E11.59 TYPE 2 DIABETES MELLITUS WITH OTHER CIRCULATORY COMPLICATION, WITH LONG-TERM CURRENT USE OF INSULIN (H): Primary | ICD-10-CM

## 2024-01-19 DIAGNOSIS — E55.9 VITAMIN D DEFICIENCY: ICD-10-CM

## 2024-01-19 DIAGNOSIS — Z79.4 TYPE 2 DIABETES MELLITUS WITH OTHER CIRCULATORY COMPLICATION, WITH LONG-TERM CURRENT USE OF INSULIN (H): Primary | ICD-10-CM

## 2024-01-19 PROCEDURE — 99443 PR PHYSICIAN TELEPHONE EVALUATION 21-30 MIN: CPT | Mod: 93 | Performed by: PHYSICIAN ASSISTANT

## 2024-01-19 NOTE — NURSING NOTE
Is the patient currently in the state of [place]? YES    Visit mode:TELEPHONE    If the visit is dropped, the patient can be reconnected by: TELEPHONE VISIT: Phone number:   Telephone Information:   Mobile [phone]       Will anyone else be joining the visit? NO  (If patient encounters technical issues they should call [phone]    How would you like to obtain your AVS? Declined    Are changes needed to the allergy or medication list? Pt stated no changes to allergies and Pt stated no med changes    Reason for visit: FANNIE Mcdowell, UZAIR VVF

## 2024-01-19 NOTE — LETTER
1/19/2024       RE: Janine Cornell  [address]  [facility] [address]     Dear Colleague,    Thank you for referring your patient, Janine Cornell, to the [facility] [place] at [facility]. Please see a copy of my visit note below.    Outcome for 01/17/24 1:38 PM:  Call was answered then disconnected.   Elizabeth Polanco LPN   Outcome for 01/18/24 11:31 AM:  Per patient will probably have readings at visit.  Elizabeth Polanco LPN   Outcome for 01/19/24 1:16 PM: Patient declined to share glucose readings  Nieves Mcdowell MA    Patient is showing 4/5 MNCM met. A1c not in range   Elizabeth Polanco LPN          Time of start: 1:36 pm   Time of end: 2:00 pm   Total duration of telephone visit: 26 minutes.  Providers location: offsite.  Patients location: [place].    HPI  Janine Cornell is a 58 year old female with type 2 diabetes mellitus. Video visit for diabetes follow up today.   Pt last seen by Dr. Norman in Oct 2023.  Pt was dx having type 2 diabetes mellitus in 2010.  No hx of diabetic retinopathy, seen regularly for hx of gpa. No nephropathy. Sx of numbness, stiffness and pain in toes.  Pt has hx of  granulomatosis polyangiitis, fibromyalgia, NSTEM - CAD s/p stents, obesity, PCOS, chronic pancreatitis, HTN, hyperlipidemia, fatty liver, asthma, ischemic cardiomyopathy, COVID infection x 2 and anemia.  She has multiple allergies.  For her diabetes, she is taking Metformin 2000 mg daily, Jardiance 10 mg in am  and Lantus 55 units subcutaneous each am.  Most recent AC 8.3 % on 10/18/2023 and previous A1C was  9.5 % on 6/21/2023.  A1C was 10.3 % in Aug 2022.  I have no glucose meter today.  She reports several blood sugars in the 160-180 range in the am.  Pt does not check her blood sugar later in the day.  She is asking for a new gluose meter and strips.  On ROS today, chronic abd pain and nausea. She has an MRI pancreas and gastric emptying study scheduled  for later this month.  Pain, numbness and stiffness in toes. No foot ulcers.  Some loose stools.    Denies dysuria.    Diabetes Care  Retinopathy: no diabetic retinopathy. Hx of gpa and seen by Oph on regular basis.  Nephropathy: none.Pt taking lisinopril/hydrochlorothiazide.  Neuropathy: not sure.  Foot Exam: no exam today.  Taking aspirin: no.  Lipids: LDL 82 in 12/2023. Pt taking Pravachol.  Insulin: yes.  DM meds: Metformin and Jardiance.   Testing: glucose meter and supplies ordered today; pt not interested in using a Freestyle Abigail sensor.    ROS  See under HPI.    Allergies  Allergies   Allergen Reactions    Amoxicillin-Pot Clavulanate      Unknown possible hives and edema    Amoxicillin-Pot Clavulanate     Artificial Sweetner (Informational Only)  Other (See Comments)     Increased headache    Azithromycin     Clavulanic Acid     Diatrizoate Other (See Comments)     Pt wants this listed because she is allergic to shellfish     Imitrex [Triptans]      Severe face/neck/chest tightness and flushing side effects     Penicillins Hives     Unknown     Pork Allergy      Stomach pain, cramping, diarrhea, itching, nausea and headaches    Shellfish Allergy Hives and Swelling    Shellfish-Derived Products     Sulfa Antibiotics Hives and Swelling    Sulfatolamide     Zithromax [Azithromycin Dihydrate] Swelling     Unknown        Medications  Current Outpatient Medications   Medication Sig Dispense Refill    acetaminophen (TYLENOL) 325 MG tablet Take 1-2 tablets (325-650 mg) by mouth every 6 hours as needed for pain (headache) 250 tablet 4    ADVAIR DISKUS 250-50 MCG/ACT inhaler Inhale 1 puff into the lungs 2 times daily      albuterol (2.5 MG/3ML) 0.083% neb solution INL 1 VIAL VIA NEBULIZATION Q 4 TO 6 HOURS PRN  1    albuterol (PROAIR HFA, PROVENTIL HFA, VENTOLIN HFA) 108 (90 BASE) MCG/ACT inhaler Inhale 2 puffs into the lungs every 6 hours as needed for shortness of breath / dyspnea or wheezing 3 Inhaler 1     BANOPHEN 25 MG tablet Take 25 mg by mouth At Bedtime      blood glucose (NO BRAND SPECIFIED) lancets standard Use to test blood sugar 1-4 times daily or as directed. 400 each 3    blood glucose (NO BRAND SPECIFIED) test strip Use to test blood sugar 1-4 times daily or as directed. 400 strip 3    Blood Pressure Monitor KIT 1 each daily Monitor home blood pressure as instructed by physician.  Dispense [place]on blood pressure kit. 1 kit 0    calcium carbonate (TUMS) 500 MG chewable tablet Take 3-4 tablets (1,500-2,000 mg) by mouth daily as needed 90 tablet 3    clopidogrel (PLAVIX) 75 MG tablet Take 1 tablet (75 mg) by mouth daily . 90 tablet 3    clotrimazole (MYCELEX) 10 MG lozenge Place 1 lozenge (10 mg) inside cheek 5 times daily 50 lozenge 1    cyclobenzaprine (FLEXERIL) 10 MG tablet Take 1 tablet (10 mg) by mouth 2 times daily as needed for muscle spasms 60 tablet 3    dicyclomine (BENTYL) 20 MG tablet TAKE 1 TABLET(20 MG) BY MOUTH FOUR TIMES DAILY AS NEEDED. 60 tablet 4    empagliflozin (JARDIANCE) 10 MG TABS tablet Take 1 tablet (10 mg) by mouth daily 30 tablet 3    EPIPEN 2-RIKY 0.3 MG/0.3ML injection INJECT 0.3 MG INTO THE MUSCLE PRF ANAPHYALAXIS  0    esomeprazole (NEXIUM) 40 MG DR capsule Take 1 capsule (40 mg) by mouth 2 times daily Take 30-60 minutes before eating. 180 capsule 0    estradiol (VAGIFEM) 10 MCG TABS vaginal tablet Place 1 tablet (10 mcg) vaginally twice a week 24 tablet 3    fluticasone (FLONASE) 50 MCG/ACT nasal spray Spray 1 spray in nostril as needed      folic acid (FOLVITE) 1 MG tablet Take 1 tablet (1 mg) by mouth daily 90 tablet 0    hydrocortisone (CORTAID) 1 % external cream Apply topically 2 times daily (Patient taking differently: Apply topically as needed) 60 g 1    insulin glargine (LANTUS PEN) 100 UNIT/ML pen Inject 55 Units Subcutaneous every morning Or as directed. 75 mL 3    insulin pen needle (BD PEN NEEDLE MARCK 2ND GEN) 32G X 4 MM miscellaneous Use one pen needle daily or as  directed. 100 each 3    ketoconazole (NIZORAL) 2 % shampoo Apply topically daily as needed      levocetirizine (XYZAL) 5 MG tablet Take 5 mg by mouth as needed      lisinopril-hydrochlorothiazide (ZESTORETIC) 20-25 MG tablet Take 1 tablet by mouth daily 90 tablet 3    LORazepam (ATIVAN) 0.5 MG tablet Take 1 tablet 30-60 minutes before your scheduled MRI 1 tablet 0    magnesium 250 MG tablet TAKE 1 TABLET(250 MG) BY MOUTH TWICE DAILY 60 tablet 3    metFORMIN (GLUCOPHAGE XR) 500 MG 24 hr tablet Take 4 tablets (2,000 mg) by mouth daily (with dinner) 360 tablet 3    methylPREDNISolone (MEDROL DOSEPAK) 4 MG tablet therapy pack Follow Package Directions (Patient not taking: Reported on 12/21/2023) 21 tablet 0    metoprolol succinate ER (TOPROL XL) 100 MG 24 hr tablet Take 1 tablet (100 mg) by mouth daily 90 tablet 3    Multiple Vitamin (TAB-A-GERALD) TABS Take 1 tablet by mouth daily 90 tablet 1    nitroGLYcerin (NITROSTAT) 0.4 MG sublingual tablet For chest pain place 1 tablet under the tongue every 5 minutes for 3 doses. If symptoms persist 5 minutes after 1st dose call 911. 30 tablet 11    nitroGLYcerin (NITROSTAT) 0.4 MG sublingual tablet Place 1 tablet (0.4 mg) under the tongue every 5 minutes as needed for chest pain . After 3 doses, if pain persists call 911. 25 tablet 3    olopatadine (PATANOL) 0.1 % ophthalmic solution Place 1 drop into both eyes as needed      pravastatin (PRAVACHOL) 40 MG tablet Take 1 tablet (40 mg) by mouth daily 90 tablet 3    prochlorperazine (COMPAZINE) 5 MG tablet Take 1 tablet (5 mg) by mouth every 6 hours as needed for nausea or vomiting 60 tablet 3    sennosides (SENOKOT) 8.6 MG tablet 1-2 tabs a day as needed for constipation 60 tablet 1    spironolactone (ALDACTONE) 25 MG tablet Take 1 tablet (25 mg) by mouth daily. May also take 0.5 tablets (12.5 mg) daily as needed (for weight gain). 45 tablet 9    sucralfate (CARAFATE) 1 GM tablet Take 1 tablet (1 g) by mouth 4 times daily 120  tablet 3    terbinafine (LAMISIL) 1 % external cream Apply topically 2 times daily (Patient taking differently: Apply topically as needed) 42 g 1    tiZANidine (ZANAFLEX) 4 MG tablet Take 1 tablet (4 mg) by mouth 3 times daily as needed for muscle spasms 21 tablet 3    traMADol (ULTRAM) 50 MG tablet TAKE 1 TABLET(50 MG) BY MOUTH EVERY 8 HOURS AS NEEDED FOR SEVERE PAIN 60 tablet 3    vitamin D2 (ERGOCALCIFEROL) 86432 units (1250 mcg) capsule Take 1 capsule (50,000 Units) by mouth every 7 days 4 capsule 0       Family History  family history includes Anesthesia Reaction in her son; Arthritis in her brother, maternal uncle, and mother; C.A.D. in her brother, father, and mother; Cerebrovascular Disease in her father and maternal uncle; Depression in her father; Diabetes in her father and maternal uncle; Eye Disorder in her maternal uncle; Family History Negative in an other family member; Heart Disease in her mother; Heart Disease (age of onset: 15) in her brother; Heart Disease (age of onset: 50) in her father; Hypertension in her father, maternal aunt, maternal uncle, and mother; Neurologic Disorder in her sister and sister; Respiratory in her son; Thyroid Disease in her mother.    Social History   reports that she quit smoking about 7 years ago. Her smoking use included cigarettes. She has a 0.2 pack-year smoking history. She has never used smokeless tobacco. She reports that she does not drink alcohol and does not use drugs.     Past Medical History  Past Medical History:   Diagnosis Date    Abnormal glandular Papanicolaou smear of cervix 1992    Allergic rhinitis     Allergy, airborne subst    Arthritis     ASCVD (arteriosclerotic cardiovascular disease)     Chronic pain     Chronic pancreatitis (H)     idiopathic, Tx: PPI, antioxidants, pancreatic enzymes    Common migraine     Coronary artery disease     Costochondritis     Difficulty in walking(719.7)     Dyspnea on exertion     Ectasia, mammary duct      Controlled with [facility], persistent nipple discharge    Elevated fasting glucose     Gastro-oesophageal reflux disease     Granulomatosis with polyangiitis (H)     Hernia     History of angina     Hyperlipidemia LDL goal < 100     Hypertension goal BP (blood pressure) < 140/90     Essential hypertension    Iron deficiency anemia     Ischemic cardiomyopathy     Menorrhagia     Migraine headaches     Mild persistent asthma     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 Matias, neg w/u, Rx'd w/prednisone    Ventral hernia, unspecified, without mention of obstruction or gangrene 2012       Past Surgical History:   Procedure Laterality Date    C/SECTION, LOW TRANSVERSE  1996        CARDIAC SURGERY      cardiac stent- recent in 16; 4 other stents    COLONOSCOPY N/A 2023    Procedure: COLONOSCOPY, WITH POLYPECTOMY;  Surgeon: Percy Gross MD;  Location: [facility] OR    DILATION AND CURETTAGE N/A 2016    Procedure: DILATION AND CURETTAGE;  Surgeon: Nahed Butler MD;  [facility]    ENDOMETRIAL SAMPLING (BIOPSY) N/A 2023    Procedure: ENDOMETRIAL BIOPSY;  Surgeon: Nahed Butler MD;  [facility]    ESOPHAGOSCOPY, GASTROSCOPY, DUODENOSCOPY (EGD), COMBINED N/A 2022    Procedure: ESOPHAGOGASTRODUODENOSCOPY, WITH BIOPSY;   Surgeon: Enzo Sesay MD;  Location: [facility]    ESOPHAGOSCOPY, GASTROSCOPY, DUODENOSCOPY (EGD), COMBINED N/A 5/25/2023    Procedure: ESOPHAGOGASTRODUODENOSCOPY, WITH BIOPSY;  Surgeon: Percy Gross MD;  Location: [facility] OR    EXAM UNDER ANESTHESIA PELVIC N/A 4/28/2023    Procedure: EXAM UNDER ANESTHESIA;  Surgeon: Nahed Butler MD;  [facility]    HC UGI ENDOSCOPY W EUS  11/07/2008    Dr. Pastrana, possible chronic pancreatitis, fatty liver    HERNIA REPAIR  12/01/2012    done at [facility]    INSERT INTRAUTERINE DEVICE N/A 07/06/2016    Procedure: INSERT INTRAUTERINE DEVICE;  Surgeon: Nahed Butler MD;  [facility]    INT UTERINE FIBRIOD EMBOLIZATION  10/29/2014    LAPAROSCOPIC CHOLECYSTECTOMY  05/28/2008    Dr. Arnol GRUBBS TX, CERVICAL  1992    s/p LEEP, done at [facility] in [place].    ORBITOTOMY Right 03/15/2016    Procedure: ORBITOTOMY;  Surgeon: Myron Cyr MD;  Location: [facility]    ORBITOTOMY Right 08/04/2017    Procedure: ORBITOTOMY;  RIGHT ORBITOTOMY AND BIOPSY;  Surgeon: Charis Holbrook MD;  Location: [facility]    REMOVE INTRAUTERINE DEVICE N/A 4/28/2023    Procedure: Remove intrauterine device,;  Surgeon: Nahed Butler MD;  [facility]    REPAIR PTOSIS Right 11/17/2017    Procedure: REPAIR PTOSIS;  RIGHT UPPER LID PTOSIS REPAIR;  Surgeon: Myron Cyr MD;  Location: [facility]    UPPER GI ENDOSCOPY  10/21/2008    mild gastritis, Dr. Rocky [facility]A ECHO HEART XTHORACIC,COMPLETE, W/O DOPPLER  02/04/2004    [facility] WNL, EF 60%       Physical Exam    No exam today.      RESULTS  Creatinine   Date Value Ref Range Status   12/14/2023 1.05 (H) 0.51 - 0.95 mg/dL Final   05/28/2021 1.00 0.52 - 1.04 mg/dL Final     GFR Estimate   Date Value Ref Range Status   12/14/2023 62 >60 mL/min/1.73m2 Final   05/28/2021 64 >60 mL/min/[1.73_m2] Final     Comment:     Non  GFR Calc  Starting 12/18/2018, serum creatinine based estimated  GFR (eGFR) will be   calculated using the Chronic Kidney Disease Epidemiology Collaboration   (CKD-EPI) equation.       Hemoglobin A1C   Date Value Ref Range Status   06/21/2023 9.5 (H) <5.7 % Final     Comment:     Normal <5.7%   Prediabetes 5.7-6.4%    Diabetes 6.5% or higher     Note: Adopted from ADA consensus guidelines.   06/18/2020 8.0 (H) 0 - 5.6 % Final     Comment:     Normal <5.7% Prediabetes 5.7-6.4%  Diabetes 6.5% or higher - adopted from ADA   consensus guidelines.       Potassium   Date Value Ref Range Status   12/14/2023 4.0 3.4 - 5.3 mmol/L Final   07/08/2022 3.9 3.4 - 5.3 mmol/L Final   05/28/2021 3.5 3.4 - 5.3 mmol/L Final     ALT   Date Value Ref Range Status   12/14/2023 22 0 - 50 U/L Final     Comment:     Reference intervals for this test were updated on 6/12/2023 to more accurately reflect our healthy population. There may be differences in the flagging of prior results with similar values performed with this method. Interpretation of those prior results can be made in the context of the updated reference intervals.     05/28/2021 28 0 - 50 U/L Final     AST   Date Value Ref Range Status   12/14/2023 25 0 - 45 U/L Final     Comment:     Reference intervals for this test were updated on 6/12/2023 to more accurately reflect our healthy population. There may be differences in the flagging of prior results with similar values performed with this method. Interpretation of those prior results can be made in the context of the updated reference intervals.   05/28/2021 20 0 - 45 U/L Final     TSH   Date Value Ref Range Status   01/19/2023 0.40 0.30 - 4.20 uIU/mL Final   05/14/2022 1.48 0.40 - 4.00 mU/L Final   03/06/2019 0.25 (L) 0.40 - 4.00 mU/L Final     T4 Free   Date Value Ref Range Status   03/06/2019 1.00 0.76 - 1.46 ng/dL Final     Free T4   Date Value Ref Range Status   02/04/2022 1.27 0.76 - 1.46 ng/dL Final       Cholesterol   Date Value Ref Range Status   12/14/2023 145 <200 mg/dL Final    06/29/2023 161 <200 mg/dL Final   06/18/2020 102 <200 mg/dL Final   07/11/2019 132 <200 mg/dL Final     HDL Cholesterol   Date Value Ref Range Status   06/18/2020 30 (L) >49 mg/dL Final   07/11/2019 40 (L) >49 mg/dL Final     Direct Measure HDL   Date Value Ref Range Status   12/14/2023 36 (L) >=50 mg/dL Final   06/29/2023 42 (L) >=50 mg/dL Final     LDL Cholesterol Calculated   Date Value Ref Range Status   12/14/2023 82 <=100 mg/dL Final   06/29/2023 89 <=100 mg/dL Final   06/18/2020 50 <100 mg/dL Final     Comment:     Desirable:       <100 mg/dl   07/11/2019 62 <100 mg/dL Final     Comment:     Desirable:       <100 mg/dl     LDL Cholesterol Direct   Date Value Ref Range Status   12/14/2023 94 <100 mg/dL Final     Comment:     Age 2-19 years:  Desirable: 0-110 mg/dL   Borderline high: 110-129 mg/dL   High: >= 130 mg/dL    Age 20 years and older:  Desirable: <100mg/dL  Above desirable: 100-129 mg/dL   Borderline high: 130-159 mg/dL   High: 160-189 mg/dL   Very high: >= 190 mg/dL   06/29/2023 97 <100 mg/dL Final     Comment:     Age 2-19 years:  Desirable: 0-110 mg/dL   Borderline high: 110-129 mg/dL   High: >= 130 mg/dL    Age 20 years and older:  Desirable: <100mg/dL  Above desirable: 100-129 mg/dL   Borderline high: 130-159 mg/dL   High: 160-189 mg/dL   Very high: >= 190 mg/dL     Triglycerides   Date Value Ref Range Status   12/14/2023 134 <150 mg/dL Final   06/29/2023 148 <150 mg/dL Final   06/18/2020 104 <150 mg/dL Final   07/11/2019 149 <150 mg/dL Final     Cholesterol/HDL Ratio   Date Value Ref Range Status   07/29/2015 3.5 0.0 - 5.0 Final   02/04/2015 3.1 0.0 - 5.0 Final         ASSESSMENT/PLAN:      TYPE 2 DIABETES MELLITUS: Uncontrolled type 2 diabetes. Janine is not checking her blood sugars frequent enough I have asked her to check her FBS and predinner blood sugar. She is not interested in using a Freestyle Abigail sensor. I placed an order for a new glucose meter and supplies today. She reports having  blood sugars in the 160-280 range fasting when she does check. I asked her to increase Lantus 58 units subcutaneous at hs.  She does not want to increase her Jardiance dose.  Avoid use of GLP-1 drugs given her hx of chronic pancreatitis. No hx of diabetic retinopathy per patient. No hx of nephropathy.  Most recent creat 1.05 with GFR 62 mL/min in 12/2023. She reports pain, numbness and stiffness in her toes. Podiatry referral placed.  HX OF CHRONIC PANCREATITIS: Avoid GLP-1 drugs as above.  OBESITY: She declines to meet with RD or CDE.   FOOT PAIN: Referred to Podiatry.   FOLLOW UP: With Dr. Norman 5/1/2024.   Podiatry referral placed today. Glucose meter and supplies ordered today.    Time spent reviewing chart and labs today = 5 minutes.  Time for telephone visit today = 26  minutes.  Time for documentation today = 10 minutes.    Total time for visit today= 41  minutes    Krissy Danielle PA-C

## 2024-01-19 NOTE — PROGRESS NOTES
Time of start: 1:36 pm   Time of end: 2:00 pm   Total duration of telephone visit: 26 minutes.  Providers location: offsite.  Patients location: [place].    HPI  Janine Cornell is a 58 year old female with type 2 diabetes mellitus. Video visit for diabetes follow up today.   Pt last seen by Dr. Norman in Oct 2023.  Pt was dx having type 2 diabetes mellitus in 2010.  No hx of diabetic retinopathy, seen regularly for hx of gpa. No nephropathy. Sx of numbness, stiffness and pain in toes.  Pt has hx of  granulomatosis polyangiitis, fibromyalgia, NSTEM - CAD s/p stents, obesity, PCOS, chronic pancreatitis, HTN, hyperlipidemia, fatty liver, asthma, ischemic cardiomyopathy, COVID infection x 2 and anemia.  She has multiple allergies.  For her diabetes, she is taking Metformin 2000 mg daily, Jardiance 10 mg in am  and Lantus 55 units subcutaneous each am.  Most recent AC 8.3 % on 10/18/2023 and previous A1C was  9.5 % on 6/21/2023.  A1C was 10.3 % in Aug 2022.  I have no glucose meter today.  She reports several blood sugars in the 160-180 range in the am.  Pt does not check her blood sugar later in the day.  She is asking for a new gluose meter and strips.  On ROS today, chronic abd pain and nausea. She has an MRI pancreas and gastric emptying study scheduled for later this month.  Pain, numbness and stiffness in toes. No foot ulcers.  Some loose stools.    Denies dysuria.    Diabetes Care  Retinopathy: no diabetic retinopathy. Hx of gpa and seen by [facility] on regular basis.  Nephropathy: none.Pt taking lisinopril/hydrochlorothiazide.  Neuropathy: not sure.  Foot Exam: no exam today.  Taking aspirin: no.  Lipids: LDL 82 in 12/2023. Pt taking Pravachol.  Insulin: yes.  DM meds: Metformin and Jardiance.   Testing: glucose meter and supplies ordered today; pt not interested in using a Freestyle Abigail sensor.    ROS  See under HPI.    Allergies  Allergies   Allergen Reactions    Amoxicillin-Pot Clavulanate      Unknown possible hives  and edema    Amoxicillin-Pot Clavulanate     Artificial Sweetner (Informational Only)  Other (See Comments)     Increased headache    Azithromycin     Clavulanic Acid     Diatrizoate Other (See Comments)     Pt wants this listed because she is allergic to shellfish     Imitrex [Triptans]      Severe face/neck/chest tightness and flushing side effects     Penicillins Hives     Unknown     Pork Allergy      Stomach pain, cramping, diarrhea, itching, nausea and headaches    Shellfish Allergy Hives and Swelling    Shellfish-Derived Products     Sulfa Antibiotics Hives and Swelling    Sulfatolamide     Zithromax [Azithromycin Dihydrate] Swelling     Unknown        Medications  Current Outpatient Medications   Medication Sig Dispense Refill    acetaminophen (TYLENOL) 325 MG tablet Take 1-2 tablets (325-650 mg) by mouth every 6 hours as needed for pain (headache) 250 tablet 4    ADVAIR DISKUS 250-50 MCG/ACT inhaler Inhale 1 puff into the lungs 2 times daily      albuterol (2.5 MG/3ML) 0.083% neb solution INL 1 VIAL VIA NEBULIZATION Q 4 TO 6 HOURS PRN  1    albuterol (PROAIR HFA, PROVENTIL HFA, VENTOLIN HFA) 108 (90 BASE) MCG/ACT inhaler Inhale 2 puffs into the lungs every 6 hours as needed for shortness of breath / dyspnea or wheezing 3 Inhaler 1    BANOPHEN 25 MG tablet Take 25 mg by mouth At Bedtime      blood glucose (NO BRAND SPECIFIED) lancets standard Use to test blood sugar 1-4 times daily or as directed. 400 each 3    blood glucose (NO BRAND SPECIFIED) test strip Use to test blood sugar 1-4 times daily or as directed. 400 strip 3    Blood Pressure Monitor KIT 1 each daily Monitor home blood pressure as instructed by physician.  Dispense [facility] blood pressure kit. 1 kit 0    calcium carbonate (TUMS) 500 MG chewable tablet Take 3-4 tablets (1,500-2,000 mg) by mouth daily as needed 90 tablet 3    clopidogrel (PLAVIX) 75 MG tablet Take 1 tablet (75 mg) by mouth daily . 90 tablet 3    clotrimazole (MYCELEX) 10 MG lozenge  Place 1 lozenge (10 mg) inside cheek 5 times daily 50 lozenge 1    cyclobenzaprine (FLEXERIL) 10 MG tablet Take 1 tablet (10 mg) by mouth 2 times daily as needed for muscle spasms 60 tablet 3    dicyclomine (BENTYL) 20 MG tablet TAKE 1 TABLET(20 MG) BY MOUTH FOUR TIMES DAILY AS NEEDED. 60 tablet 4    empagliflozin (JARDIANCE) 10 MG TABS tablet Take 1 tablet (10 mg) by mouth daily 30 tablet 3    EPIPEN 2-RIKY 0.3 MG/0.3ML injection INJECT 0.3 MG INTO THE MUSCLE PRF ANAPHYALAXIS  0    esomeprazole (NEXIUM) 40 MG DR capsule Take 1 capsule (40 mg) by mouth 2 times daily Take 30-60 minutes before eating. 180 capsule 0    estradiol (VAGIFEM) 10 MCG TABS vaginal tablet Place 1 tablet (10 mcg) vaginally twice a week 24 tablet 3    fluticasone (FLONASE) 50 MCG/ACT nasal spray Spray 1 spray in nostril as needed      folic acid (FOLVITE) 1 MG tablet Take 1 tablet (1 mg) by mouth daily 90 tablet 0    hydrocortisone (CORTAID) 1 % external cream Apply topically 2 times daily (Patient taking differently: Apply topically as needed) 60 g 1    insulin glargine (LANTUS PEN) 100 UNIT/ML pen Inject 55 Units Subcutaneous every morning Or as directed. 75 mL 3    insulin pen needle (BD PEN NEEDLE MARCK 2ND GEN) 32G X 4 MM miscellaneous Use one pen needle daily or as directed. 100 each 3    ketoconazole (NIZORAL) 2 % shampoo Apply topically daily as needed      levocetirizine (XYZAL) 5 MG tablet Take 5 mg by mouth as needed      lisinopril-hydrochlorothiazide (ZESTORETIC) 20-25 MG tablet Take 1 tablet by mouth daily 90 tablet 3    LORazepam (ATIVAN) 0.5 MG tablet Take 1 tablet 30-60 minutes before your scheduled MRI 1 tablet 0    magnesium 250 MG tablet TAKE 1 TABLET(250 MG) BY MOUTH TWICE DAILY 60 tablet 3    metFORMIN (GLUCOPHAGE XR) 500 MG 24 hr tablet Take 4 tablets (2,000 mg) by mouth daily (with dinner) 360 tablet 3    methylPREDNISolone (MEDROL DOSEPAK) 4 MG tablet therapy pack Follow Package Directions (Patient not taking: Reported on  12/21/2023) 21 tablet 0    metoprolol succinate ER (TOPROL XL) 100 MG 24 hr tablet Take 1 tablet (100 mg) by mouth daily 90 tablet 3    Multiple Vitamin (TAB-A-GERALD) TABS Take 1 tablet by mouth daily 90 tablet 1    nitroGLYcerin (NITROSTAT) 0.4 MG sublingual tablet For chest pain place 1 tablet under the tongue every 5 minutes for 3 doses. If symptoms persist 5 minutes after 1st dose call 911. 30 tablet 11    nitroGLYcerin (NITROSTAT) 0.4 MG sublingual tablet Place 1 tablet (0.4 mg) under the tongue every 5 minutes as needed for chest pain . After 3 doses, if pain persists call 911. 25 tablet 3    olopatadine (PATANOL) 0.1 % ophthalmic solution Place 1 drop into both eyes as needed      pravastatin (PRAVACHOL) 40 MG tablet Take 1 tablet (40 mg) by mouth daily 90 tablet 3    prochlorperazine (COMPAZINE) 5 MG tablet Take 1 tablet (5 mg) by mouth every 6 hours as needed for nausea or vomiting 60 tablet 3    sennosides (SENOKOT) 8.6 MG tablet 1-2 tabs a day as needed for constipation 60 tablet 1    spironolactone (ALDACTONE) 25 MG tablet Take 1 tablet (25 mg) by mouth daily. May also take 0.5 tablets (12.5 mg) daily as needed (for weight gain). 45 tablet 9    sucralfate (CARAFATE) 1 GM tablet Take 1 tablet (1 g) by mouth 4 times daily 120 tablet 3    terbinafine (LAMISIL) 1 % external cream Apply topically 2 times daily (Patient taking differently: Apply topically as needed) 42 g 1    tiZANidine (ZANAFLEX) 4 MG tablet Take 1 tablet (4 mg) by mouth 3 times daily as needed for muscle spasms 21 tablet 3    traMADol (ULTRAM) 50 MG tablet TAKE 1 TABLET(50 MG) BY MOUTH EVERY 8 HOURS AS NEEDED FOR SEVERE PAIN 60 tablet 3    vitamin D2 (ERGOCALCIFEROL) 94942 units (1250 mcg) capsule Take 1 capsule (50,000 Units) by mouth every 7 days 4 capsule 0       Family History  family history includes Anesthesia Reaction in her son; Arthritis in her brother, maternal uncle, and mother; C.A.D. in her brother, father, and mother;  Cerebrovascular Disease in her father and maternal uncle; Depression in her father; Diabetes in her father and maternal uncle; Eye Disorder in her maternal uncle; Family History Negative in an other family member; Heart Disease in her mother; Heart Disease (age of onset: 15) in her brother; Heart Disease (age of onset: 50) in her father; Hypertension in her father, maternal aunt, maternal uncle, and mother; Neurologic Disorder in her sister and sister; Respiratory in her son; Thyroid Disease in her mother.    Social History   reports that she quit smoking about 7 years ago. Her smoking use included cigarettes. She has a 0.2 pack-year smoking history. She has never used smokeless tobacco. She reports that she does not drink alcohol and does not use drugs.     Past Medical History  Past Medical History:   Diagnosis Date    Abnormal glandular Papanicolaou smear of cervix 1992    Allergic rhinitis     Allergy, airborne subst    Arthritis     ASCVD (arteriosclerotic cardiovascular disease)     Chronic pain     Chronic pancreatitis (H)     idiopathic, Tx: PPI, antioxidants, pancreatic enzymes    Common migraine     Coronary artery disease     Costochondritis     Difficulty in walking(719.7)     Dyspnea on exertion     Ectasia, mammary duct     [facility] n[facility] discharge    Elevated fasting glucose     Gastro-oesophageal reflux disease     Granulomatosis with polyangiitis (H)     Hernia     History of angina     Hyperlipidemia LDL goal < 100     Hypertension goal BP (blood pressure) < 140/90     Essential hypertension    Iron deficiency anemia     Ischemic cardiomyopathy     Menorrhagia     Migraine headaches     Mild persistent asthma     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 Matias, neg w/u, Rx'd w/prednisone    Ventral hernia, unspecified, without mention of obstruction or gangrene 2012       Past Surgical History:   Procedure Laterality Date    C/SECTION, LOW TRANSVERSE  1996        CARDIAC SURGERY      cardiac stent- recent in 16; 4 other stents    COLONOSCOPY N/A 2023    Procedure: COLONOSCOPY, WITH POLYPECTOMY;  Surgeon: Percy Gross MD;  Location: [facility] OR    DILATION AND CURETTAGE N/A 2016    Procedure: DILATION AND CURETTAGE;  Surgeon: Nahed Butler MD;  [facility]    ENDOMETRIAL SAMPLING (BIOPSY) N/A 2023    Procedure: ENDOMETRIAL BIOPSY;  Surgeon: Nahed Butler MD;  [facility]    ESOPHAGOSCOPY, GASTROSCOPY, DUODENOSCOPY (EGD), COMBINED N/A 2022    Procedure: ESOPHAGOGASTRODUODENOSCOPY, WITH BIOPSY;  Surgeon: Enzo Sesay MD;  Location: [facility]    ESOPHAGOSCOPY, GASTROSCOPY, DUODENOSCOPY (EGD), COMBINED N/A 2023    Procedure: ESOPHAGOGASTRODUODENOSCOPY, WITH BIOPSY;  Surgeon: Percy Gross MD;  Location: [facility] OR    EXAM UNDER ANESTHESIA PELVIC N/A 2023    Procedure: EXAM UNDER ANESTHESIA;  Surgeon: Nahed Butler MD;  [facility]    HC UGI ENDOSCOPY W EUS  2008    Dr. Pastrana, possible chronic pancreatitis, fatty liver    [place] [facility] [place] INTRAUTERINE DEVICE N/A 2016    Procedure: INSERT INTRAUTERINE DEVICE;  Surgeon: Nahed Butler MD;  [facility]    INT UTERINE FIBRIOD  EMBOLIZATION  10/29/2014    LAPAROSCOPIC CHOLECYSTECTOMY  05/28/2008    Dr. Arnol GRUBBS TX, CERVICAL  1992    s/p HUMERA, done at [facility] in [place].    ORBITOTOMY Right 03/15/2016    Procedure: ORBITOTOMY;  Surgeon: Myron Cyr MD;  Location: [facility]    ORBITOTOMY Right 08/04/2017    Procedure: ORBITOTOMY;  RIGHT ORBITOTOMY AND BIOPSY;  Surgeon: Charis Holbrook MD;  Location: [facility]    REMOVE INTRAUTERINE DEVICE N/A 4/28/2023    Procedure: Remove intrauterine device,;  Surgeon: Nahed Butler MD;  [facility]    REPAIR PTOSIS Right 11/17/2017    Procedure: REPAIR PTOSIS;  RIGHT UPPER LID PTOSIS REPAIR;  Surgeon: Myron Cyr MD;  Location: [facility]    UPPER GI ENDOSCOPY  10/21/2008    mild gastritis, Dr. Rocky [facility] ECHO HEART XTHORACIC,COMPLETE, W/O DOPPLER  02/04/2004    [facility] WNL, EF 60%       Physical Exam    No exam today.      RESULTS  Creatinine   Date Value Ref Range Status   12/14/2023 1.05 (H) 0.51 - 0.95 mg/dL Final   05/28/2021 1.00 0.52 - 1.04 mg/dL Final     GFR Estimate   Date Value Ref Range Status   12/14/2023 62 >60 mL/min/1.73m2 Final   05/28/2021 64 >60 mL/min/[1.73_m2] Final     Comment:     Non  GFR Calc  Starting 12/18/2018, serum creatinine based estimated GFR (eGFR) will be   calculated using the Chronic Kidney Disease Epidemiology Collaboration   (CKD-EPI) equation.       Hemoglobin A1C   Date Value Ref Range Status   06/21/2023 9.5 (H) <5.7 % Final     Comment:     Normal <5.7%   Prediabetes 5.7-6.4%    Diabetes 6.5% or higher     Note: Adopted from ADA consensus guidelines.   06/18/2020 8.0 (H) 0 - 5.6 % Final     Comment:     Normal <5.7% Prediabetes 5.7-6.4%  Diabetes 6.5% or higher - adopted from ADA   consensus guidelines.       Potassium   Date Value Ref Range Status   12/14/2023 4.0 3.4 - 5.3 mmol/L Final   07/08/2022 3.9 3.4 - 5.3 mmol/L Final   05/28/2021 3.5 3.4 - 5.3 mmol/L Final     ALT   Date Value Ref  Range Status   12/14/2023 22 0 - 50 U/L Final     Comment:     Reference intervals for this test were updated on 6/12/2023 to more accurately reflect our healthy population. There may be differences in the flagging of prior results with similar values performed with this method. Interpretation of those prior results can be made in the context of the updated reference intervals.     05/28/2021 28 0 - 50 U/L Final     AST   Date Value Ref Range Status   12/14/2023 25 0 - 45 U/L Final     Comment:     Reference intervals for this test were updated on 6/12/2023 to more accurately reflect our healthy population. There may be differences in the flagging of prior results with similar values performed with this method. Interpretation of those prior results can be made in the context of the updated reference intervals.   05/28/2021 20 0 - 45 U/L Final     TSH   Date Value Ref Range Status   01/19/2023 0.40 0.30 - 4.20 uIU/mL Final   05/14/2022 1.48 0.40 - 4.00 mU/L Final   03/06/2019 0.25 (L) 0.40 - 4.00 mU/L Final     T4 Free   Date Value Ref Range Status   03/06/2019 1.00 0.76 - 1.46 ng/dL Final     Free T4   Date Value Ref Range Status   02/04/2022 1.27 0.76 - 1.46 ng/dL Final       Cholesterol   Date Value Ref Range Status   12/14/2023 145 <200 mg/dL Final   06/29/2023 161 <200 mg/dL Final   06/18/2020 102 <200 mg/dL Final   07/11/2019 132 <200 mg/dL Final     HDL Cholesterol   Date Value Ref Range Status   06/18/2020 30 (L) >49 mg/dL Final   07/11/2019 40 (L) >49 mg/dL Final     Direct Measure HDL   Date Value Ref Range Status   12/14/2023 36 (L) >=50 mg/dL Final   06/29/2023 42 (L) >=50 mg/dL Final     LDL Cholesterol Calculated   Date Value Ref Range Status   12/14/2023 82 <=100 mg/dL Final   06/29/2023 89 <=100 mg/dL Final   06/18/2020 50 <100 mg/dL Final     Comment:     Desirable:       <100 mg/dl   07/11/2019 62 <100 mg/dL Final     Comment:     Desirable:       <100 mg/dl     LDL Cholesterol Direct   Date Value  Ref Range Status   12/14/2023 94 <100 mg/dL Final     Comment:     Age 2-19 years:  Desirable: 0-110 mg/dL   Borderline high: 110-129 mg/dL   High: >= 130 mg/dL    Age 20 years and older:  Desirable: <100mg/dL  Above desirable: 100-129 mg/dL   Borderline high: 130-159 mg/dL   High: 160-189 mg/dL   Very high: >= 190 mg/dL   06/29/2023 97 <100 mg/dL Final     Comment:     Age 2-19 years:  Desirable: 0-110 mg/dL   Borderline high: 110-129 mg/dL   High: >= 130 mg/dL    Age 20 years and older:  Desirable: <100mg/dL  Above desirable: 100-129 mg/dL   Borderline high: 130-159 mg/dL   High: 160-189 mg/dL   Very high: >= 190 mg/dL     Triglycerides   Date Value Ref Range Status   12/14/2023 134 <150 mg/dL Final   06/29/2023 148 <150 mg/dL Final   06/18/2020 104 <150 mg/dL Final   07/11/2019 149 <150 mg/dL Final     Cholesterol/HDL Ratio   Date Value Ref Range Status   07/29/2015 3.5 0.0 - 5.0 Final   02/04/2015 3.1 0.0 - 5.0 Final         ASSESSMENT/PLAN:      TYPE 2 DIABETES MELLITUS: Uncontrolled type 2 diabetes. Janine is not checking her blood sugars frequent enough I have asked her to check her FBS and predinner blood sugar. She is not interested in using a Freestyle Abigail sensor. I placed an order for a new glucose meter and supplies today. She reports having blood sugars in the 160-280 range fasting when she does check. I asked her to increase Lantus 58 units subcutaneous at hs.  She does not want to increase her Jardiance dose.  Avoid use of GLP-1 drugs given her hx of chronic pancreatitis. No hx of diabetic retinopathy per patient. No hx of nephropathy.  Most recent creat 1.05 with GFR 62 mL/min in 12/2023. She reports pain, numbness and stiffness in her toes. Podiatry referral placed.  HX OF CHRONIC PANCREATITIS: Avoid GLP-1 drugs as above.  OBESITY: She declines to meet with RD or CDE.   FOOT PAIN: Referred to Podiatry.   FOLLOW UP: With Dr. Norman 5/1/2024.   Podiatry referral placed today. Glucose meter and  supplies ordered today.    Time spent reviewing chart and labs today = 5 minutes.  Time for telephone visit today = 26  minutes.  Time for documentation today = 10 minutes.    Total time for visit today= 41  minutes    Krissy Danielle PA-C

## 2024-01-23 NOTE — TELEPHONE ENCOUNTER
vitamin D2 (ERGOCALCIFEROL) 47497 units (1250 mcg) capsule 4 capsule 0 12/7/2023     Last Office Visit: 9/6/23  Future Office visit:  1/31/24    Routing refill request to provider for review/approval because:  Not on refill protocol    Lucia Wong RN  [facility] Red Flag Triage/MRT

## 2024-01-24 RX ORDER — ERGOCALCIFEROL 1.25 MG/1
50000 CAPSULE, LIQUID FILLED ORAL
Qty: 12 CAPSULE | OUTPATIENT
Start: 2024-01-24

## 2024-01-24 RX ORDER — CHOLECALCIFEROL (VITAMIN D3) 50 MCG
1 TABLET ORAL DAILY
Qty: 90 TABLET | Refills: 0 | Status: SHIPPED | OUTPATIENT
Start: 2024-01-24 | End: 2024-06-05

## 2024-01-24 NOTE — TELEPHONE ENCOUNTER
Majo Mckinnon MD      1/23/24  8:06 PM  Please refill as vit D 2000 units a day, she has not had vit D level checked since 1/2023.    Refused Vitamin D 50,000 units refill  Sent new Rx per above to pharmacy.     Lucia Wong RN  P Red Flag Triage/MRT

## 2024-01-26 ENCOUNTER — HOSPITAL ENCOUNTER (OUTPATIENT)
Dept: NUCLEAR MEDICINE | Facility: CLINIC | Age: 59
Setting detail: NUCLEAR MEDICINE
Discharge: HOME | End: 2024-01-26
Attending: PHYSICIAN ASSISTANT | Admitting: PHYSICIAN ASSISTANT
Payer: COMMERCIAL

## 2024-01-26 DIAGNOSIS — G89.29 CHRONIC EPIGASTRIC PAIN: ICD-10-CM

## 2024-01-26 DIAGNOSIS — R10.13 CHRONIC EPIGASTRIC PAIN: ICD-10-CM

## 2024-01-26 DIAGNOSIS — R68.81 EARLY SATIETY: ICD-10-CM

## 2024-01-26 PROCEDURE — 343N000001 HC RX 343: Performed by: PHYSICIAN ASSISTANT

## 2024-01-26 PROCEDURE — A9541 TC99M SULFUR COLLOID: HCPCS | Performed by: PHYSICIAN ASSISTANT

## 2024-01-26 PROCEDURE — 78264 GASTRIC EMPTYING IMG STUDY: CPT

## 2024-01-26 PROCEDURE — 78264 GASTRIC EMPTYING IMG STUDY: CPT | Mod: 26 | Performed by: RADIOLOGY

## 2024-01-26 RX ADMIN — Medication 2 MILLICURIE: at 08:50

## 2024-01-27 NOTE — TELEPHONE ENCOUNTER
DIAGNOSIS: bilateral foot pain,Krissy Danielle PA-C in [facility] ENDO DIABETES    APPOINTMENT DATE: 2/20/24   NOTES STATUS DETAILS   OFFICE NOTE from referring provider Internal 1/19/24 - Krissy Danielle PA-C - [facility] Endocrinology    MEDICATION LIST Internal

## 2024-01-31 ENCOUNTER — ANCILLARY PROCEDURE (OUTPATIENT)
Dept: GENERAL RADIOLOGY | Facility: CLINIC | Age: 59
End: 2024-01-31
Attending: INTERNAL MEDICINE
Payer: COMMERCIAL

## 2024-01-31 ENCOUNTER — TELEPHONE (OUTPATIENT)
Dept: RHEUMATOLOGY | Facility: CLINIC | Age: 59
End: 2024-01-31

## 2024-01-31 ENCOUNTER — OFFICE VISIT (OUTPATIENT)
Dept: RHEUMATOLOGY | Facility: CLINIC | Age: 59
End: 2024-01-31
Attending: INTERNAL MEDICINE
Payer: COMMERCIAL

## 2024-01-31 VITALS
WEIGHT: 162 LBS | HEART RATE: 54 BPM | OXYGEN SATURATION: 97 % | DIASTOLIC BLOOD PRESSURE: 78 MMHG | HEIGHT: 62 IN | SYSTOLIC BLOOD PRESSURE: 115 MMHG | BODY MASS INDEX: 29.81 KG/M2

## 2024-01-31 DIAGNOSIS — M25.562 PAIN IN BOTH KNEES, UNSPECIFIED CHRONICITY: ICD-10-CM

## 2024-01-31 DIAGNOSIS — M25.561 PAIN IN BOTH KNEES, UNSPECIFIED CHRONICITY: ICD-10-CM

## 2024-01-31 DIAGNOSIS — M19.90 INFLAMMATORY ARTHRITIS: ICD-10-CM

## 2024-01-31 DIAGNOSIS — M31.30 GRANULOMATOSIS WITH POLYANGIITIS WITHOUT RENAL INVOLVEMENT (H): Primary | ICD-10-CM

## 2024-01-31 DIAGNOSIS — M31.30 GRANULOMATOSIS WITH POLYANGIITIS WITHOUT RENAL INVOLVEMENT (H): ICD-10-CM

## 2024-01-31 DIAGNOSIS — I77.82 ANCA-ASSOCIATED VASCULITIS (H): ICD-10-CM

## 2024-01-31 DIAGNOSIS — Z51.81 ENCOUNTER FOR MEDICATION MONITORING: ICD-10-CM

## 2024-01-31 DIAGNOSIS — G89.29 OTHER CHRONIC PAIN: ICD-10-CM

## 2024-01-31 DIAGNOSIS — I77.82 ANCA-ASSOCIATED VASCULITIS (H): Primary | ICD-10-CM

## 2024-01-31 PROCEDURE — 99215 OFFICE O/P EST HI 40 MIN: CPT | Performed by: INTERNAL MEDICINE

## 2024-01-31 PROCEDURE — 73630 X-RAY EXAM OF FOOT: CPT | Mod: RT | Performed by: RADIOLOGY

## 2024-01-31 PROCEDURE — 73120 X-RAY EXAM OF HAND: CPT | Mod: LT | Performed by: RADIOLOGY

## 2024-01-31 PROCEDURE — G0463 HOSPITAL OUTPT CLINIC VISIT: HCPCS | Performed by: INTERNAL MEDICINE

## 2024-01-31 PROCEDURE — 73560 X-RAY EXAM OF KNEE 1 OR 2: CPT | Mod: RT | Performed by: RADIOLOGY

## 2024-01-31 ASSESSMENT — PAIN SCALES - GENERAL: PAINLEVEL: SEVERE PAIN (6)

## 2024-01-31 NOTE — NURSING NOTE
"Chief Complaint   Patient presents with    RECHECK     Vital signs:      BP: 115/78 Pulse: 54     SpO2: 97 %     Height: 157.5 cm (5' 2\") (pt reported) Weight: 73.5 kg (162 lb)  Estimated body mass index is 29.63 kg/m  as calculated from the following:    Height as of this encounter: 1.575 m (5' 2\").    Weight as of this encounter: 73.5 kg (162 lb).      Beulah Fortune CMA   1/31/2024 12:07 PM    "

## 2024-01-31 NOTE — LETTER
1/31/2024       RE: Janine Cornell  [address]  [facility] [address]     Dear Colleague,    Thank you for referring your patient, Janine Cornell, to the [facility] RHEUMATOLOGY [facility] [place] [facility]. Please see a copy of my visit note below.    Rheumatology F/U In Person Visit Note    Last visit note: 9/6/2023    Today's visit date: 1/31/2024    Reason for in person visit: F/U GPA    HPI     Ms. 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 She is s/p tx with HCQ since 5/2014, now off due to abnormal eye exam. On rituximab since 12/20218 with great response. Previously tried and did not tolerate MTX, AZA.    She had lateral orbitotomy and debulking orbital mass right eye on 9/26/18 with Dr. Shah and Dr. Valdez at [place]. 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 There were no complications according to the op note.    Today 1/31/2024:      Nortriptyline was prescribed by another provider, but has not tried it yet    R side of her face started swelling again.    Reports pain over neck, hips, knees and hands.          9/6/2023:    -reports pain/swelling over hands/feet, has more arthritis problem over past year  -gets numbness over toes, bottom of feet, can't see neurology till 1/2024  -walking causes pain in the feet  -gets pain over hips after a trip to [employer], it is a new problem  -sometimes gets swelling over R cheek, noticed it yesterday outside with heat, her face was also bright red        3/9/2023: Reports pain/swelling of her hands. S/p last rituximab 1 gram on 9/7/2022.       1/25/2023: Janine is doing a phone visit for follow up, was feeling ill and switched in person to phone visit. She got sick around Thanksgiving, saw her PCP on 12/17, was tested positive for COVID, it took a longtime to feel better, did not take  paxlovid as it was already past 2 weeks. Since then, she has not been feeling well. Has headaches, body ache, her eyes especially R eye look pink, they burn and itch a lot. Has sense of smell but can not taste her food. She is very tired, hardly leaves her house. Last time, left house for doctor visit, was tired and sore. Has tingling and pinprick feeling over arms and legs. Has upcoming follow up with PCP on 2/10. She had thyroid US for thyroid nodules. She had abnormal screening mammogram on 1/9, will go back for diagnostic mammogram and US of L breast on 1/31.          08/19/2022  Reports fatigue and headaches. Headache worsens after taking Zofran for nausea. Joint symptoms come and go. Last rituximab on 4/6/22. Rituximab is helpful but feels like it wears off prior to the 6 months. Next appointment scheduled for October 2022. Develops intermittent vaginal yeast infection and thrush related to rising blood glucose. Right eye without symptoms. No new fevers, chills, skin changes. Son is wondering if she takes herbal supplements for headaches will this interact with any of her medications. Scheduled to meet with pharmacist today.        4/22/2022:   Each rituximab infusion causes vaginal yeast infection and thrush related to rise in BG. Her BG has stayed in higher level since last rituximab. Has more ache and pain, myalgia and arthralgia. Recently has had headaches with high BP. Had last rituximab 1000 mg on 4/6.Her R eye is itching and swelling up.  Today, her BG is 177.Has gained 20 lbs since Feb 2022. Had severe pain in her arm after covid vaccine, it took a month to get better.      12/16/2021: Janine presents for follow up. Reports ESOB with cold, has ongoing joint pain. R eye pain is intermittent.  Reports headaches after rituximab 1 gram on 10/18/2021.  Last week, her R knee was hurting.      8/20/2021: Janine has pain over arms, knees. Some days, feel stiff all day long. Some days can't do stairs. Hard to tell  if there is swelling as has more water retention during summer.  Some days, eye swells up like today. No fevers, SOB, CP or cough.  Has rosacea but some days wakes up with heat rash over sun. Her face is peeling.  Sometimes can't lift things or grab things with pain from elbow to hands. Has shoulder and neck pain but this forearm pain is new.  Stopped HCQ in mid July due to concern for potential HCQ toxicity on OCT, her eye exam with Dr. Vernon has been re-scheduled and upcoming on 9/14 to address HCQ toxicity, pain is not worse off HCQ.  Not COVID vaccinated but is cautious.      5/10/2021: Joint ache, knees hurt, R elbow hurts, R arm hurts, R hand hurts. Has lots of swelling in her joints especially hands.    Depending on weather, joints jhurt, sometimes pain is 7/10.  Has problem with taking stairs and balance.  Gets off balance.   R eye gets tinglin, swelling.  Gets out of breath, gets worse with seasonal allergies.  Over past month and half, took nitro more, like 8 times.  No hemooptysis, no hematuria.  Has allergies.      4/21/2021: Had last rituximab 1 gram on 1/19, 2/2/2021. Reports rituximab starts to wear off earlier, has more sx this time. Eye swelling is intermittent. Gets indigestion/ GERD sx with constipation after the infusion.  She reports having asthma, swelling of neck, arms. She thinks that it could be from her vasculitis. She is worried about going down on rituximab dose.   Otherwise unchanged sx, no SOB or hemoptysis or persistent cough, or   Somedays her knees and hips hurt a lot, feel like bone on bone in her knees, especially on changing position.      Component      Latest Ref Rng 2/28/2013 2/28/2013          12:14 PM 12:14 PM   WBC      4.0 - 11.0 10e9/L     RBC Count      3.8 - 5.2 10e12/L     Hemoglobin      11.7 - 15.7 g/dL     Hematocrit      35.0 - 47.0 %     MCV      78 - 100 fl     MCH      26.5 - 33.0 pg     MCHC      31.5 - 36.5 g/dL     RDW      10.0 - 15.0 %     Platelet Count       150 - 450 10e9/L     Specimen Description           Lyme Screen IgG and IgM           Vitamin D Deficiency screening      30 - 75 ug/L     Ferritin      10 - 300 ng/mL     Sed Rate      0 - 20 mm/h     ALLI Screen by EIA      <1.0     Rheumatoid Factor      0 - 14 IU/mL     CK Total      30 - 225 U/L  78   Uric Acid      2.5 - 7.5 mg/dL 6.7      Component      Latest Ref Rng 2/28/2013          12:14 PM   WBC      4.0 - 11.0 10e9/L    RBC Count      3.8 - 5.2 10e12/L    Hemoglobin      11.7 - 15.7 g/dL    Hematocrit      35.0 - 47.0 %    MCV      78 - 100 fl    MCH      26.5 - 33.0 pg    MCHC      31.5 - 36.5 g/dL    RDW      10.0 - 15.0 %    Platelet Count      150 - 450 10e9/L    Specimen Description       Serum   Lyme Screen IgG and IgM       Test value: <0.75....Interpretation: Negative....If you highly suspect Lyme . . .   Vitamin D Deficiency screening      30 - 75 ug/L    Ferritin      10 - 300 ng/mL    Sed Rate      0 - 20 mm/h    ALLI Screen by EIA      <1.0    Rheumatoid Factor      0 - 14 IU/mL    CK Total      30 - 225 U/L    Uric Acid      2.5 - 7.5 mg/dL      Component      Latest Ref Rng 2/28/2013 2/28/2013 2/28/2013 2/28/2013          12:14 PM 12:14 PM 12:14 PM 12:14 PM   WBC      4.0 - 11.0 10e9/L       RBC Count      3.8 - 5.2 10e12/L       Hemoglobin      11.7 - 15.7 g/dL       Hematocrit      35.0 - 47.0 %       MCV      78 - 100 fl       MCH      26.5 - 33.0 pg       MCHC      31.5 - 36.5 g/dL       RDW      10.0 - 15.0 %       Platelet Count      150 - 450 10e9/L       Specimen Description             Lyme Screen IgG and IgM             Vitamin D Deficiency screening      30 - 75 ug/L       Ferritin      10 - 300 ng/mL    10   Sed Rate      0 - 20 mm/h   23 (H)    ALLI Screen by EIA      <1.0  <1.0 . . .     Rheumatoid Factor      0 - 14 IU/mL 26 (H)      CK Total      30 - 225 U/L       Uric Acid      2.5 - 7.5 mg/dL         5/2013  CBC WITH PLATELETS DIFFERENTIAL       Component Value Range     WBC 11.3 (*) 4.0 - 11.0 10e9/L    RBC Count 4.56  3.8 - 5.2 10e12/L    Hemoglobin 11.1 (*) 11.7 - 15.7 g/dL    Hematocrit 34.3 (*) 35.0 - 47.0 %    MCV 75 (*) 78 - 100 fl    MCH 24.3 (*) 26.5 - 33.0 pg    MCHC 32.4  31.5 - 36.5 g/dL    RDW 16.1 (*) 10.0 - 15.0 %    Platelet Count 315  150 - 450 10e9/L    Diff Method Automated Method      % Neutrophils 71.6  40 - 75 %    % Lymphocytes 20.9  20 - 48 %    % Monocytes 4.3  0 - 12 %    % Eosinophils 2.8  0 - 6 %    % Basophils 0.2  0 - 2 %    % Immature Granulocytes 0.2  0 - 0.4 %    Absolute Neutrophil 8.1  1.6 - 8.3 10e9/L    Absolute Lymphocytes 2.4  0.8 - 5.3 10e9/L    Absolute Monoctyes 0.5  0.0 - 1.3 10e9/L    Absolute Eosinophils 0.3  0.0 - 0.7 10e9/L    Absolute Basophils 0.0  0.0 - 0.2 10e9/L    Abs Immature Granulocytes 0.0  0 - 0.03 10e9/L   AMYLASE       Component Value Range    Amylase 103  30 - 110 U/L   COMPREHENSIVE METABOLIC PANEL       Component Value Range    Sodium 144  133 - 144 mmol/L    Potassium 3.8  3.4 - 5.3 mmol/L    Chloride 105  94 - 109 mmol/L    Carbon Dioxide 23  20 - 32 mmol/L    Anion Gap 17  6 - 17 mmol/L    Glucose 173 (*) 60 - 99 mg/dL    Urea Nitrogen 13  5 - 24 mg/dL    Creatinine 0.83  0.52 - 1.04 mg/dL    GFR Estimate 74  >60 mL/min/1.7m2    GFR Estimate If Black 90  >60 mL/min/1.7m2    Calcium 9.7  8.5 - 10.4 mg/dL    Bilirubin Total 0.4  0.2 - 1.3 mg/dL    Albumin 4.3  3.9 - 5.1 g/dL    Protein Total 7.8  6.8 - 8.8 g/dL    Alkaline Phosphatase 66  40 - 150 U/L    ALT 36  0 - 50 U/L    AST 28  0 - 45 U/L   CK TOTAL       Component Value Range    CK Total 66  30 - 225 U/L   CRP INFLAMMATION       Component Value Range    CRP Inflammation 10.4 (*) 0.0 - 8.0 mg/L   LIPASE       Component Value Range    Lipase 353 (*) 20 - 250 U/L   ERYTHROCYTE SEDIMENTATION RATE AUTO       Component Value Range    Sed Rate 26 (*) 0 - 20 mm/h   ROUTINE UA WITH MICROSCOPIC REFLEX TO CULTURE       Component Value Range    Color Urine Yellow       Appearance Urine Slightly Cloudy      Glucose Urine 30 (*) NEG mg/dL    Bilirubin Urine Negative  NEG    Ketones Urine 5 (*) NEG mg/dL    Specific Gravity Urine 1.026  1.003 - 1.035    Blood Urine Trace (*) NEG    pH Urine 5.0  5.0 - 7.0 pH    Protein Albumin Urine 10 (*) NEG mg/dL    Urobilinogen mg/dL Normal  0.0 - 2.0 mg/dL    Nitrite Urine Negative  NEG    Leukocyte Esterase Urine Negative  NEG    Source Midstream Urine      WBC Urine 1  0 - 2 /HPF    RBC Urine 4 (*) 0 - 2 /HPF    Squamous Epithelial /HPF Urine <1  0 - 1 /HPF    Mucous Urine Present (*) NEG /LPF    Hyaline Casts 3 (*) 0 - 2 /LPF    Calcium Oxalate Moderate (*) NEG /HPF   COMPLEMENT C3       Component Value Range    Complement C3 143  76 - 169 mg/dL   COMPLEMENT C4       Component Value Range    Complement C4 31  15 - 50 mg/dL   HEPATITIS C ANTIBODY       Component Value Range    Hepatitis C Antibody Negative  NEG   CARDIOLIPIN ANTIBODY IGG AND IGM       Component Value Range    Cardiolipin IgG Marline    0 - 15.0 GPL    Value: <15.0      Interpretation:  Negative    Cardiolipin IgM Marline    0 - 12.5 MPL    Value: <12.5      Interpretation:  Negative   LUPUS PANEL       Component Value Range    Lupus Result    NEG    Value: Negative      (Note)      COMMENTS:      The INR is normal.      APTT is normal.  1:2 Mix is not indicated.      DRVVT Screen is normal.      Thrombin time is normal.      NEGATIVE TEST; A LUPUS ANTICOAGULANT WAS NOT DETECTED IN THIS      SPECIMEN WITHIN THE LIMITS OF THE TESTING REPERTOIRE.      If the clinical picture is strongly suggestive of an antiphospholipid      syndrome, recommend anticardiolipin and beta-2-glycoprotein (IgG and      IgM) antibody tests.      Leela Franks M.D.  [phone]      5/2/2013            INR =  0.93 N = 0.86-1.14  (No additional charge)      TT = 15.7 N = 13.0-19.0 sec  (No additional charge)            APTT'S:    Seconds      Reagent =  Stago LA      Normal  =  38      Patient  =  34       1:2 Mix  =  N/A      Reference =  31-43             DILUTE MADELINE VIPER VENOM TEST:      DRVVT Screen Ratio = 0.76 Normal = <1.21         IMMUNOGLOBULIN G SUBCLASSES       Component Value Range    IGG 1030  695 - 1620 mg/dL    IgG1 488  300 - 856 mg/dL    IgG2 325  158 - 761 mg/dL    IgG3 47  24 - 192 mg/dL    IgG4 18  11 - 86 mg/dL   SUNNY ANTIBODY PANEL       Component Value Range    Ribonucleic Protein IgG Antibody 0      Smith Antibody IgG 1      SSA (RO) Antibody IgG 4      SSB (LA) Antibody IgG 0      Scleroderma Antibody IgG 0     BETA 2 GLYCOPROTEIN ANTIBODIES IGG IGM       Component Value Range    Beta-2-Glycopro IgG 1      Beta-2-Glycopro IgM 5     CYCLIC CIT PEPT IGG       Component Value Range    Cyclic Cit Pept IgG/IgA    <20 UNITS    Value: <20      Interpretation:  Negative   DNA DOUBLE STRANDED ANTIBODIES       Component Value Range    DNA-ds    0 - 29 IU/mL    Value: <15      Interpretation:  Negative       CT CHEST PULMONARY EMBOLISM W CONTRAST 5/20/2015 4:57 PM  HISTORY: Pain. SOB. Elevated d-dimer.   TECHNIQUE: 65 mL Isovue 370. Axial images with coronal  reconstructions.  COMPARISON: None.  FINDINGS: Calcified granuloma with surrounding scarring in the  posterolateral left upper lobe. Triangular shaped opacity at the right  lung base in the lateral right middle lobe could represent some  scarring, atelectasis or infiltrate. There is also some scarring or  atelectasis in the posteromedial right lung base. Lungs otherwise  clear.  The pulmonary arteries are well opacified. No CT evidence for acute  pulmonary embolus. No aortic dissection.  Diffuse fatty infiltration of the liver.  IMPRESSION  IMPRESSION:   1. No pulmonary embolus identified.  2. Small focus of atelectasis, infiltrate or scarring in the lateral  right middle lobe.  3. Old granulomatous disease.  4. Otherwise negative.  SILVERIO VAZQUEZ MD    Copath Report      Patient Name: FAVIO MARTINEZ   MR#: [id number]   Specimen #: [id number]  "  Collected: 3/15/2016   Received: 3/15/2016   Reported: 3/17/2016 13:33   Ordering Phy(s): PARVEEN GRACE ZOE     ORIGINAL REPORT FOLLOWS ADDENDUM  ADDENDUM     TO ORIGINAL REPORT   Status: Signed Out   Date Ordered:3/18/2016   Date Complete:3/18/2016   Date Reported:3/18/2016 12:06   Signed Out By: Marianne Godfrey MD     INTERPRETATION:   This addendum is done to incorporate the results of fungal (GMS) stains   done on the specimen.  Specimen is negative for fungal organisms.  The   original final diagnosis remains unchanged.     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  Negative for malignancy(Please   see microscopic description)     Electronically signed out by:     Marianne Godfrey MD     CLINICAL HISTORY:   right orbital mass     GROSS:   The specimen is received labeled \"right orbital biopsy\" and consists of   red-tan nodule measuring 1.5 x 0.9 x 0.6 cm with one smooth side and   opposite rough side consistent with periosteum.  The specimen is   bisected revealing homogenous pale tan fleshy cut surface.  Touch   preparations are prepared, air dried and fixed, portion of specimen is   submitted in RPMI for possible lymphoma workup [facility],   ([employer], [place], [place] ).  The remainder is entirely submitted.   (Dictated by: Marianne Godfrey MD 3/15/2016 04:45 PM)     MICROSCOPIC:     Specimen consists of portion of lacrimal gland with acute and chronic   inflammation.  Focal area of microabscess formation associated with   small areas of necrosis are also present.  Inflammation is seen   extending to the periorbital adipose tissue forming panniculitis.   Specimen is negative for malignancy.  Samples sent for immunophenotyping   to [facility], ([employer], [place], [place] " ) reveals no evidence   of monoclonality or aberrant antigen expression.  A GMS (fungal) stain   is pending and results will be reported as an addendum.     CPT Codes:   A: 83684-YE5, 33553-BIGMT, SOH, 94497-TDHIR, 41138-JUDL     TESTING LAB LOCATION:   [address]  [id number] [phone]     COLLECTION SITE:   Client: [facility]   Location: [facility] (S)            Component      Latest Ref Rng 4/29/2016   Nucleated RBCs      0 /100 0   Absolute Neutrophil      1.6 - 8.3 10e9/L 8.9 (H)   Absolute Lymphocytes      0.8 - 5.3 10e9/L 3.2   Absolute Monocytes      0.0 - 1.3 10e9/L 0.8   Absolute Eosinophils      0.0 - 0.7 10e9/L 0.2   Absolute Basophils      0.0 - 0.2 10e9/L 0.1   Abs Immature Granulocytes      0 - 0.4 10e9/L 0.1   Absolute Nucleated RBC       0.0   IGG      695 - 1620 mg/dL 836   IgG1      300 - 856 mg/dL 280 (L)   IgG2      158 - 761 mg/dL 277   IgG3      24 - 192 mg/dL 35   IgG4      11 - 86 mg/dL 16   RNP Antibody IgG      0.0 - 0.9 AI <0.2 . . .   Smith SUNNY Antibody IgG      0.0 - 0.9 AI <0.2 . . .   SSA (Ro) (SUNNY) Antibody, IgG      0.0 - 0.9 AI <0.2 . . .   SSB (La) (SUNNY) Antibody, IgG      0.0 - 0.9 AI <0.2 . . .   Scleroderma Antibody Scl-70 SUNNY IgG      0.0 - 0.9 AI <0.2 . . .   Cholesterol      <200 mg/dL 154   Triglycerides      <150 mg/dL 220 (H)   HDL Cholesterol      >49 mg/dL 42 (L)   LDL Cholesterol Calculated      <100 mg/dL 67   Non HDL Cholesterol      <130 mg/dL 111   M Tuberculosis Result      NEG Negative   M Tuberculosis Antigen Value       0.00   Albumin      3.4 - 5.0 g/dL 4.3   ALT      0 - 50 U/L 30   AST      0 - 45 U/L 10   Complement C3      76 - 169 mg/dL 157   Complement C4      15 - 50 mg/dL 32   CRP Inflammation      0.0 - 8.0 mg/L <2.9   Sed Rate      0 - 20 mm/h 2   DNA-ds      <10 IU/mL 1   Cyclic Citrullinated Peptide Antibody, IgG      <7 U/mL 1   Rheumatoid Factor      <20 IU/mL <20    Proteinase 3 Antibody IgG      0.0 - 0.9 AI <0.2 . . .   Myeloperoxidase Antibody IgG      0.0 - 0.9 AI 2.5 (H)   Vitamin D Deficiency screening      20 - 75 ug/L 24   Hemoglobin A1C      4.3 - 6.0 % 7.9 (H)   ALLI by IFA IgG       1:40 (A) . . .     Component      Latest Ref Rng & Units 1/16/2021   WBC      4.0 - 11.0 10e9/L    RBC Count      3.8 - 5.2 10e12/L    Hemoglobin      11.7 - 15.7 g/dL    Hematocrit      35.0 - 47.0 %    MCV      78 - 100 fl    MCH      26.5 - 33.0 pg    MCHC      31.5 - 36.5 g/dL    RDW      10.0 - 15.0 %    Platelet Count      150 - 450 10e9/L    Diff Method          % Neutrophils      %    % Lymphocytes      %    % Monocytes      %    % Eosinophils      %    % Basophils      %    % Immature Granulocytes      %    Nucleated RBCs      0 /100    Absolute Neutrophil      1.6 - 8.3 10e9/L    Absolute Lymphocytes      0.8 - 5.3 10e9/L    Absolute Monocytes      0.0 - 1.3 10e9/L    Absolute Eosinophils      0.0 - 0.7 10e9/L    Absolute Basophils      0.0 - 0.2 10e9/L    Abs Immature Granulocytes      0 - 0.4 10e9/L    Absolute Nucleated RBC          IGG      610 - 1,616 mg/dL    IGA      84 - 499 mg/dL    IGM      35 - 242 mg/dL    Creatinine      0.52 - 1.04 mg/dL    GFR Estimate      >60 mL/min/1.73:m2    GFR Estimate If Black      >60 mL/min/1.73:m2    COVID-19 Virus PCR to [facility] - Source       Nasopharyngeal   COVID-19 Virus PCR to [facility] - Result       Not Detected   Neutrophil Cytoplasmic Antibody      <1:10 titer    Neutrophil Cytoplasmic Antibody Pattern          CD19 B Cells      6 - 27 %    Absolute CD19      107 - 698 cells/uL    Myeloperoxidase Antibody IgG      0.0 - 0.9 AI    Proteinase 3 Antibody IgG      0.0 - 0.9 AI    Vitamin D Deficiency screening      20 - 75 ug/L    Sed Rate      0 - 30 mm/h    CRP Inflammation      0.0 - 8.0 mg/L    Albumin      3.4 - 5.0 g/dL    ALT      0 - 50 U/L    AST      0 - 45 U/L      Component      Latest Ref Rng & Units 5/7/2021   WBC       4.0 - 11.0 10e9/L 8.9   RBC Count      3.8 - 5.2 10e12/L 4.89   Hemoglobin      11.7 - 15.7 g/dL 12.7   Hematocrit      35.0 - 47.0 % 39.7   MCV      78 - 100 fl 81   MCH      26.5 - 33.0 pg 26.0 (L)   MCHC      31.5 - 36.5 g/dL 32.0   RDW      10.0 - 15.0 % 13.7   Platelet Count      150 - 450 10e9/L 336   Diff Method       Automated Method   % Neutrophils      % 65.3   % Lymphocytes      % 20.7   % Monocytes      % 7.8   % Eosinophils      % 5.3   % Basophils      % 0.7   % Immature Granulocytes      % 0.2   Nucleated RBCs      0 /100 0   Absolute Neutrophil      1.6 - 8.3 10e9/L 5.8   Absolute Lymphocytes      0.8 - 5.3 10e9/L 1.8   Absolute Monocytes      0.0 - 1.3 10e9/L 0.7   Absolute Eosinophils      0.0 - 0.7 10e9/L 0.5   Absolute Basophils      0.0 - 0.2 10e9/L 0.1   Abs Immature Granulocytes      0 - 0.4 10e9/L 0.0   Absolute Nucleated RBC       0.0   IGG      610 - 1,616 mg/dL 649   IGA      84 - 499 mg/dL 199   IGM      35 - 242 mg/dL 36   Creatinine      0.52 - 1.04 mg/dL 0.96   GFR Estimate      >60 mL/min/1.73:m2 66   GFR Estimate If Black      >60 mL/min/1.73:m2 77   COVID-19 Virus PCR to [facility] - Source          COVID-19 Virus PCR to [facility] - Result          Neutrophil Cytoplasmic Antibody      <1:10 titer <1:10   Neutrophil Cytoplasmic Antibody Pattern       The ANCA IFA is <1:10.  No further testing will be performed.   CD19 B Cells      6 - 27 % <1 (L)   Absolute CD19      107 - 698 cells/uL <1 (L)   Myeloperoxidase Antibody IgG      0.0 - 0.9 AI 1.1 (H)   Proteinase 3 Antibody IgG      0.0 - 0.9 AI <0.2   Vitamin D Deficiency screening      20 - 75 ug/L 41   Sed Rate      0 - 30 mm/h 9   CRP Inflammation      0.0 - 8.0 mg/L <2.9   Albumin      3.4 - 5.0 g/dL 4.1   ALT      0 - 50 U/L 28   AST      0 - 45 U/L 18     Lab on 07/08/2022   Component Date Value Ref Range Status    Sodium 07/08/2022 143  133 - 144 mmol/L Final    Potassium 07/08/2022 3.9  3.4 - 5.3 mmol/L Final    Chloride 07/08/2022  108  94 - 109 mmol/L Final    Carbon Dioxide (CO2) 07/08/2022 25  20 - 32 mmol/L Final    Anion Gap 07/08/2022 10  3 - 14 mmol/L Final    Urea Nitrogen 07/08/2022 17  7 - 30 mg/dL Final    Creatinine 07/08/2022 1.01  0.52 - 1.04 mg/dL Final    Calcium 07/08/2022 9.3  8.5 - 10.1 mg/dL Final    Glucose 07/08/2022 103 (A) 70 - 99 mg/dL Final    GFR Estimate 07/08/2022 65  >60 mL/min/1.73m2 Final    Effective December 21, 2021 eGFRcr in adults is calculated using the 2021 CKD-EPI creatinine equation which includes age and gender (Ryan et al., [facility], DOI: 10.1056/YRGXgs0410439)    WBC Count 07/08/2022 12.0 (A) 4.0 - 11.0 10e3/uL Final    RBC Count 07/08/2022 4.94  3.80 - 5.20 10e6/uL Final    Hemoglobin 07/08/2022 12.6  11.7 - 15.7 g/dL Final    Hematocrit 07/08/2022 39.6  35.0 - 47.0 % Final    MCV 07/08/2022 80  78 - 100 fL Final    MCH 07/08/2022 25.5 (A) 26.5 - 33.0 pg Final    MCHC 07/08/2022 31.8  31.5 - 36.5 g/dL Final    RDW 07/08/2022 13.6  10.0 - 15.0 % Final    Platelet Count 07/08/2022 350  150 - 450 10e3/uL Final    % Neutrophils 07/08/2022 66  % Final    % Lymphocytes 07/08/2022 22  % Final    % Monocytes 07/08/2022 9  % Final    % Eosinophils 07/08/2022 3  % Final    % Basophils 07/08/2022 0  % Final    % Immature Granulocytes 07/08/2022 0  % Final    NRBCs per 100 WBC 07/08/2022 0  <1 /100 Final    Absolute Neutrophils 07/08/2022 7.9  1.6 - 8.3 10e3/uL Final    Absolute Lymphocytes 07/08/2022 2.7  0.8 - 5.3 10e3/uL Final    Absolute Monocytes 07/08/2022 1.1  0.0 - 1.3 10e3/uL Final    Absolute Eosinophils 07/08/2022 0.3  0.0 - 0.7 10e3/uL Final    Absolute Basophils 07/08/2022 0.1  0.0 - 0.2 10e3/uL Final    Absolute Immature Granulocytes 07/08/2022 0.0  <=0.4 10e3/uL Final    Absolute NRBCs 07/08/2022 0.0  10e3/uL Final     Component      Latest Ref Rng & Units 8/19/2022   WBC      4.0 - 11.0 10e3/uL 12.6 (H)   RBC Count      3.80 - 5.20 10e6/uL 5.06   Hemoglobin      11.7 - 15.7 g/dL 12.8   Hematocrit       35.0 - 47.0 % 40.4   MCV      78 - 100 fL 80   MCH      26.5 - 33.0 pg 25.3 (L)   MCHC      31.5 - 36.5 g/dL 31.7   RDW      10.0 - 15.0 % 14.1   Platelet Count      150 - 450 10e3/uL 387   % Neutrophils      % 71   % Lymphocytes      % 20   % Monocytes      % 6   % Eosinophils      % 3   % Basophils      % 0   % Immature Granulocytes      % 0   NRBCs per 100 WBC      <1 /100 0   Absolute Neutrophils      1.6 - 8.3 10e3/uL 8.8 (H)   Absolute Lymphocytes      0.8 - 5.3 10e3/uL 2.5   Absolute Monocytes      0.0 - 1.3 10e3/uL 0.8   Absolute Eosinophils      0.0 - 0.7 10e3/uL 0.4   Absolute Basophils      0.0 - 0.2 10e3/uL 0.1   Absolute Immature Granulocytes      <=0.4 10e3/uL 0.1   Absolute NRBCs      10e3/uL 0.0   Color Urine      Colorless, Straw, Light Yellow, Yellow Straw   Appearance Urine      Clear Clear   Glucose Urine      Negative mg/dL 1000 (A)   Bilirubin Urine      Negative Negative   Ketones Urine      Negative mg/dL Negative   Specific Gravity Urine      1.003 - 1.035 1.020   Blood Urine      Negative Negative   pH Urine      5.0 - 7.0 5.0   Protein Albumin Urine      Negative mg/dL Negative   Urobilinogen mg/dL      Normal, 2.0 mg/dL Normal   Nitrite Urine      Negative Negative   Leukocyte Esterase Urine      Negative Negative   RBC Urine      <=2 /HPF 1   WBC Urine      <=5 /HPF 5   Squamous Epithelial /HPF Urine      <=1 /HPF <1   MPO Marline IgG Instrument Value      <3.5 U/mL 0.7   Myeloperoxidase Antibody IgG      Negative Negative   Proteinase 3 Marline IgG Instrument Value      <2.0 U/mL <1.0   Proteinase 3 Antibody IgG      Negative Negative   Protein Random Urine      g/L 0.11   Protein Total Urine g/gr Creatinine      0.00 - 0.20 g/g Cr 0.09   Creatinine Urine      mg/dL 128   IGG      610 - 1,616 mg/dL 641   IGA      84 - 499 mg/dL 204   IGM      35 - 242 mg/dL 32 (L)   Creatinine      0.52 - 1.04 mg/dL 1.24 (H)   GFR Estimate      >60 mL/min/1.73m2 51 (L)   CD19 B Cells      6 - 27 % <1 (L)    Absolute CD19      107 - 698 cells/uL <1 (L)   Neutrophil Cytoplasmic Antibody      <1:10 <1:10   Neutrophil Cytoplasmic Antibody Pattern       The ANCA IFA is <1:10.  No further testing will be performed.   AST      0 - 45 U/L 16   ALT      0 - 50 U/L 34   Albumin      3.4 - 5.0 g/dL 4.2   CRP Inflammation      0.0 - 8.0 mg/L 9.1 (H)   Sed Rate      0 - 30 mm/hr 15   Vitamin D Deficiency screening      20 - 75 ug/L 36     Component      Latest Ref Rng & Units 1/19/2023   WBC      4.0 - 11.0 10e3/uL 16.1 (H)   RBC Count      3.80 - 5.20 10e6/uL 5.39 (H)   Hemoglobin      11.7 - 15.7 g/dL 13.4   Hematocrit      35.0 - 47.0 % 41.9   MCV      78 - 100 fL 78   MCH      26.5 - 33.0 pg 24.9 (L)   MCHC      31.5 - 36.5 g/dL 32.0   RDW      10.0 - 15.0 % 15.4 (H)   Platelet Count      150 - 450 10e3/uL 383   % Neutrophils      % 79   % Lymphocytes      % 16   % Monocytes      % 4   % Eosinophils      % 1   % Basophils      % 0   % Immature Granulocytes      % 0   NRBCs per 100 WBC      <1 /100 0   Absolute Neutrophils      1.6 - 8.3 10e3/uL 12.6 (H)   Absolute Lymphocytes      0.8 - 5.3 10e3/uL 2.6   Absolute Monocytes      0.0 - 1.3 10e3/uL 0.7   Absolute Eosinophils      0.0 - 0.7 10e3/uL 0.2   Absolute Basophils      0.0 - 0.2 10e3/uL 0.1   Absolute Immature Granulocytes      <=0.4 10e3/uL 0.1   Absolute NRBCs      10e3/uL 0.0   Sodium      136 - 145 mmol/L 137   Potassium      3.4 - 5.3 mmol/L 4.0   Chloride      98 - 107 mmol/L 98   Carbon Dioxide (CO2)      22 - 29 mmol/L 25   Anion Gap      7 - 15 mmol/L 14   Urea Nitrogen      6.0 - 20.0 mg/dL 23.6 (H)   Creatinine      0.51 - 0.95 mg/dL 1.09 (H)   Calcium      8.6 - 10.0 mg/dL 10.3 (H)   Glucose      70 - 99 mg/dL 232 (H)   Alkaline Phosphatase      35 - 104 U/L 95   AST      10 - 35 U/L 23   ALT      10 - 35 U/L 26   Protein Total      6.4 - 8.3 g/dL 7.7   Albumin      3.5 - 5.2 g/dL 4.9   Bilirubin Total      <=1.2 mg/dL 0.3   GFR Estimate      >60  mL/min/1.73m2 59 (L)   Color Urine      Colorless, Straw, Light Yellow, Yellow Light Yellow   Appearance Urine      Clear Clear   Glucose Urine      Negative mg/dL >=1000 (A)   Bilirubin Urine      Negative Negative   Ketones Urine      Negative mg/dL 10 (A)   Specific Gravity Urine      1.003 - 1.035 1.033   Blood Urine      Negative Negative   pH Urine      5.0 - 7.0 6.0   Protein Albumin Urine      Negative mg/dL Negative   Urobilinogen mg/dL      Normal, 2.0 mg/dL Normal   Nitrite Urine      Negative Negative   Leukocyte Esterase Urine      Negative Negative   Iron      37 - 145 ug/dL 51   Iron Binding Capacity      240 - 430 ug/dL 362   Iron Sat Index      15 - 46 % 14 (L)   Parathyroid Hormone Intact      15 - 65 pg/mL 51   Calcium Ionized Whole Blood      4.4 - 5.2 mg/dL 4.9   Ferritin      11 - 328 ng/mL 70   TSH      0.30 - 4.20 uIU/mL 0.40   3 views cervical spine radiographs 2/10/2023 4:10 PM     History: neck pain; Neck pain     Comparison: MRI cervical spine 4/14/2015.     Findings:  AP, lateral, and odontoid views of the cervical spine were obtained.     Down to the level of C7 is well visualized on the lateral view(s). The  cervicothoracic junction is partially visualized.     There is no acute osseous abnormality. No prevertebral soft tissue  swelling. Normal cervical lordosis is maintained.       Moderate loss of intervertebral disc height at C6-7. Mild loss of disc  height at C5-6. Additional multilevel degenerative changes including  endplate osteophytosis and uncinate hypertrophy. Dens is obscured by  the overlying maxilla on the odontoid view.     The visualized lung apices are clear.                                                                      Impression:  Multilevel cervical degenerative changes, greatest at C6-7.     I have personally reviewed the examination and initial interpretation  and I agree with the findings.     TASHI KELLY MD      Component      Latest Ref Rng & Units  2/10/2023   WBC      4.0 - 11.0 10e3/uL 11.9 (H)   RBC Count      3.80 - 5.20 10e6/uL 5.30 (H)   Hemoglobin      11.7 - 15.7 g/dL 13.2   Hematocrit      35.0 - 47.0 % 40.7   MCV      78 - 100 fL 77 (L)   MCH      26.5 - 33.0 pg 24.9 (L)   MCHC      31.5 - 36.5 g/dL 32.4   RDW      10.0 - 15.0 % 15.3 (H)   Platelet Count      150 - 450 10e3/uL 415   % Neutrophils      % 69   % Lymphocytes      % 23   % Monocytes      % 6   % Eosinophils      % 2   % Basophils      % 0   % Immature Granulocytes      % 0   NRBCs per 100 WBC      <1 /100 0   Absolute Neutrophils      1.6 - 8.3 10e3/uL 8.1   Absolute Lymphocytes      0.8 - 5.3 10e3/uL 2.7   Absolute Monocytes      0.0 - 1.3 10e3/uL 0.8   Absolute Eosinophils      0.0 - 0.7 10e3/uL 0.2   Absolute Basophils      0.0 - 0.2 10e3/uL 0.0   Absolute Immature Granulocytes      <=0.4 10e3/uL 0.0   Absolute NRBCs      10e3/uL 0.0   Sodium      136 - 145 mmol/L 138   Potassium      3.4 - 5.3 mmol/L 4.1   Chloride      98 - 107 mmol/L 99   Carbon Dioxide (CO2)      22 - 29 mmol/L 23   Anion Gap      7 - 15 mmol/L 16 (H)   Urea Nitrogen      6.0 - 20.0 mg/dL 24.6 (H)   Creatinine      0.51 - 0.95 mg/dL 1.07 (H)   Calcium      8.6 - 10.0 mg/dL 10.5 (H)   Glucose      70 - 99 mg/dL 205 (H)   Alkaline Phosphatase      35 - 104 U/L 86   AST      10 - 35 U/L 26   ALT      10 - 35 U/L 30   Protein Total      6.4 - 8.3 g/dL 7.6   Albumin      3.5 - 5.2 g/dL 4.8   Bilirubin Total      <=1.2 mg/dL 0.2   GFR Estimate      >60 mL/min/1.73m2 61   MPO Marline IgG Instrument Value      <3.5 U/mL 0.8   Myeloperoxidase Antibody IgG      Negative Negative   Proteinase 3 Marline IgG Instrument Value      <2.0 U/mL <1.0   Proteinase 3 Antibody IgG      Negative Negative   IGG      610 - 1,616 mg/dL 680   IGA      84 - 499 mg/dL 197   IGM      35 - 242 mg/dL 30 (L)   CD19 B Cells      6 - 27 % <1 (L)   Absolute CD19      107 - 698 cells/uL <1 (L)   Neutrophil Cytoplasmic Antibody      <1:10 <1:10   Neutrophil  Cytoplasmic Antibody Pattern       The ANCA IFA is <1:10.  No further testing will be performed.   % Retic      0.5 - 2.0 % 1.4   Absolute Retic      0.025 - 0.095 10e6/uL 0.073   CRP Inflammation      <5.00 mg/L 6.67 (H)   Sed Rate      0 - 30 mm/hr 12   Lipase      13 - 60 U/L 26     ROS: A comprehensive ROS was done. Positives are per HPI.      HISTORY REVIEW:  Past Medical History:   Diagnosis Date    Abnormal glandular Papanicolaou smear of cervix 1992    Allergic rhinitis     Allergy, airborne subst    Arthritis     ASCVD (arteriosclerotic cardiovascular disease)     Chronic pain     Chronic pancreatitis (H)     idiopathic, Tx: PPI, antioxidants, pancreatic enzymes    Common migraine     Coronary artery disease     Costochondritis     Difficulty in walking(719.7)     Dyspnea on exertion     Ectasia, mammary duct     [facility], persistent nipple discharge    Elevated fasting glucose     Gastro-oesophageal reflux disease     Granulomatosis with polyangiitis (H)     Hernia     History of angina     Hyperlipidemia LDL goal < 100     Hypertension goal BP (blood pressure) < 140/90     Essential hypertension    Iron deficiency anemia     Ischemic cardiomyopathy     Menorrhagia     Migraine headaches     Mild persistent asthma     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 Matias, neg w/u, Rx'd w/prednisone    Ventral hernia, unspecified, without mention of obstruction or gangrene 2012     Past Surgical History:   Procedure Laterality Date    C/SECTION, LOW TRANSVERSE  1996        CARDIAC SURGERY      cardiac stent- recent in 16; 4 other stents    COLONOSCOPY N/A 2023    Procedure: COLONOSCOPY, WITH POLYPECTOMY;  Surgeon: Percy Gross MD;  Location: [facility] OR    DILATION AND CURETTAGE N/A 2016    Procedure: DILATION AND CURETTAGE;  Surgeon: Nahed Butler MD;  [facility]    ENDOMETRIAL SAMPLING (BIOPSY) N/A 2023    Procedure: ENDOMETRIAL BIOPSY;  Surgeon: Nahed Butler MD;  [facility]    ESOPHAGOSCOPY, GASTROSCOPY, DUODENOSCOPY (EGD), COMBINED N/A 2022    Procedure: ESOPHAGOGASTRODUODENOSCOPY, WITH BIOPSY;  Surgeon: Enzo Sesay MD;  [facility]: [facility]    ESOPHAGOSCOPY, GASTROSCOPY, DUODENOSCOPY (EGD), COMBINED N/A 2023    Procedure: ESOPHAGOGASTRODUODENOSCOPY, WITH BIOPSY;  Surgeon: Percy Gross MD;  Location: [facility] OR    EXAM UNDER ANESTHESIA PELVIC N/A 2023    Procedure: EXAM UNDER ANESTHESIA;  Surgeon: Nahed Butler MD;  [facility]    HC UGI ENDOSCOPY W EUS  2008    Dr. Pastrana, possible chronic pancreatitis, fatty liver    HERNIA REPAIR  2012    done at [facility]    INSERT INTRAUTERINE DEVICE N/A 2016    Procedure: INSERT INTRAUTERINE DEVICE;  Surgeon: Nahed Butler MD;  [facility]    INT UTERINE FIBRIOD EMBOLIZATION  10/29/2014    LAPAROSCOPIC CHOLECYSTECTOMY  2008    Dr. Arnol GRUBBS TX, CERVICAL  1992    s/p LEEP, done at [facility] in [place].    ORBITOTOMY Right 03/15/2016    Procedure: ORBITOTOMY;  Surgeon: Myron Cyr MD;  Location: [facility]    ORBITOTOMY Right 2017    Procedure: ORBITOTOMY;  RIGHT ORBITOTOMY AND BIOPSY;  Surgeon: Charis Holbrook MD;  Location: [facility]    REMOVE INTRAUTERINE  DEVICE N/A 2023    Procedure: Remove intrauterine device,;  Surgeon: Nahed Butler MD;  [facility]    REPAIR PTOSIS Right 2017    Procedure: REPAIR PTOSIS;  RIGHT UPPER LID PTOSIS REPAIR;  Surgeon: Myron Cyr MD;  [facility]: [facility]    UPPER GI ENDOSCOPY  10/21/2008    mild gastritis, Dr. Rocky ACLDERA ECHO HEART XTHORACIC,COMPLETE, W/O DOPPLER  2004    [facility] WNL, EF 60%     Family History   Problem Relation Age of Onset    Hypertension Mother     Arthritis Mother     Heart Disease Mother         long qt syndrome    Thyroid Disease Mother     C.A.D. Mother     Heart Disease Father 50        heart attack    Cerebrovascular Disease Father     Diabetes Father     Hypertension Father     Depression Father     C.A.D. Father     Neurologic Disorder Sister         migraines    Neurologic Disorder Sister         migraines    Heart Disease Brother 15        MI at 15, 16.     Arthritis Brother         he passed away, had severe arthritis at age 11    C.A.D. Brother     Anesthesia Reaction Son         PONV    Respiratory Son         asthma    Hypertension Maternal Aunt     Diabetes Maternal Uncle     Hypertension Maternal Uncle     Arthritis Maternal Uncle     Eye Disorder Maternal Uncle         cataracts    Cerebrovascular Disease Maternal Uncle     Family History Negative Other         neg for RA, SLE    Unknown/Adopted No family hx of         unknown neurological issues in her family, mother was adopted    Skin Cancer No family hx of         No known family hx of skin cancer    Deep Vein Thrombosis (DVT) No family hx of      Social History     Socioeconomic History    Marital status: Single     Spouse name: Not on file    Number of children: 1    Years of education: Not on file    Highest education level: Not on file   Occupational History     Employer: NONE    Tobacco Use    Smoking status: Former     Packs/day: 0.20     Years: 1.00     Additional pack years: 0.00      Total pack years: 0.20     Types: Cigarettes     Quit date: 2016     Years since quittin.0    Smokeless tobacco: Never   Vaping Use    Vaping Use: Every day    Substances: Nicotine   Substance and Sexual Activity    Alcohol use: No     Alcohol/week: 0.0 standard drinks of alcohol    Drug use: No    Sexual activity: Not Currently   Other Topics Concern    Parent/sibling w/ CABG, MI or angioplasty before 65F 55M? Yes   Social History Narrative    Balanced Diet - sometimes    Osteoporosis Prevention Measures - Dairy servings per day: 2 servings weekly    Regular Exercise -  Yes Describe walking 4 times a week    Dental Exam - NO    Seatbelts used - Yes    Self Breast Exam - Yes    Abuse: Current or Past (Physical, Sexual or Emotional)- No    Do you have any concerns about STD's -  No    Do you feel safe in your environment - No    Guns stored in the home - No    Sunscreen used - Yes    Lipids -  YES - Date:     Glucose -  YES - Date:     Eye Exam - YES - Date: one year ago    Colon Cancer Screening - No    Hemoccults - NO    Pap Test -  YES - Date: , remote history of LEEP    Mammography - YES - Date: last spring, would like to discuss, needs a referral [facility]    DEXA - NO    Immunizations reviewed and up to date - Yes, last td given in  or      Social Determinants of Health     Financial Resource Strain: Not on file   Food Insecurity: Not on file   Transportation Needs: Not on file   Physical Activity: Not on file   Stress: Not on file   Social Connections: Not on file   Interpersonal Safety: Not on file   Housing Stability: Not on file     Patient Active Problem List   Diagnosis    Seasonal affective disorder (H24)    Allergic rhinitis    PCOS (polycystic ovarian syndrome)    Moderate persistent asthma    Chronic pancreatitis (H)    Hypertension goal BP (blood pressure) < 140/90    Common migraine    NSTEMI (non-ST elevated myocardial infarction) (H)     Hyperlipidemia LDL goal <70    ASCVD (arteriosclerotic cardiovascular disease)    GERD (gastroesophageal reflux disease)    Ischemic cardiomyopathy    Hypertensive heart disease    Uterine leiomyoma    Iron deficiency anemia    Costochondritis    Vitamin D deficiency    Breast cancer screening    Spinal stenosis in cervical region    Fibromyalgia    Seronegative rheumatoid arthritis (H)    Type 2 diabetes, HbA1C goal < 8% (H)    Type 2 diabetes mellitus with other specified complication (H)    Hyperlipidemia associated with type 2 diabetes mellitus (H)    Hypertension associated with diabetes (H)    Overweight with body mass index (BMI) 25.0-29.9    Tobacco use disorder    Telogen effluvium    CAD S/P percutaneous coronary angioplasty    Status post coronary angiogram    ANCA-associated vasculitis (H)    Wegener's vasculitis    Type 1 diabetes mellitus with complications (H)    Chest discomfort    Urinary frequency    Abdominal pain, epigastric    Hypokalemia    Leukocytosis, unspecified type    Acute pancreatitis, unspecified complication status, unspecified pancreatitis type       Pregnancy Hx: She is . All miscarriages were in first trimester. H/o OC use in the past. 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    PMHx, FHx, SHx were reviewed, unchanged.    Outpatient Encounter Medications as of 2024   Medication Sig Dispense Refill    acetaminophen (TYLENOL) 325 MG tablet Take 1-2 tablets (325-650 mg) by mouth every 6 hours as needed for pain (headache) 250 tablet 4    ADVAIR DISKUS 250-50 MCG/ACT inhaler Inhale 1 puff into the lungs 2 times daily      albuterol (2.5 MG/3ML) 0.083% neb solution INL 1 VIAL VIA NEBULIZATION Q 4 TO 6 HOURS PRN  1    albuterol (PROAIR HFA, PROVENTIL HFA, VENTOLIN HFA) 108 (90 BASE) MCG/ACT inhaler Inhale 2 puffs into the lungs every 6 hours as needed for shortness of breath / dyspnea or wheezing 3 Inhaler 1    BANOPHEN 25 MG tablet Take 25 mg by mouth At  Bedtime      blood glucose (NO BRAND SPECIFIED) lancets standard Use to test blood sugar 1-4 times daily or as directed. 400 each 3    blood glucose (NO BRAND SPECIFIED) test strip Use to test blood sugar fasting each am and predinner daily. 250 strip 3    blood glucose monitoring (NO BRAND SPECIFIED) meter device kit Use to test blood sugar 2 times daily or as directed. 1 kit 0    Blood Pressure Monitor KIT 1 each daily Monitor home blood pressure as instructed by physician.  Dispense [place] blood pressure kit. 1 kit 0    calcium carbonate (TUMS) 500 MG chewable tablet Take 3-4 tablets (1,500-2,000 mg) by mouth daily as needed 90 tablet 3    clopidogrel (PLAVIX) 75 MG tablet Take 1 tablet (75 mg) by mouth daily . 90 tablet 3    clotrimazole (MYCELEX) 10 MG lozenge Place 1 lozenge (10 mg) inside cheek 5 times daily 50 lozenge 1    cyclobenzaprine (FLEXERIL) 10 MG tablet Take 1 tablet (10 mg) by mouth 2 times daily as needed for muscle spasms 60 tablet 3    dicyclomine (BENTYL) 20 MG tablet TAKE 1 TABLET(20 MG) BY MOUTH FOUR TIMES DAILY AS NEEDED. 60 tablet 4    empagliflozin (JARDIANCE) 10 MG TABS tablet Take 1 tablet (10 mg) by mouth daily 30 tablet 3    EPIPEN 2-RIKY 0.3 MG/0.3ML injection INJECT 0.3 MG INTO THE MUSCLE PRF ANAPHYALAXIS  0    esomeprazole (NEXIUM) 40 MG DR capsule Take 1 capsule (40 mg) by mouth 2 times daily Take 30-60 minutes before eating. 180 capsule 0    estradiol (VAGIFEM) 10 MCG TABS vaginal tablet Place 1 tablet (10 mcg) vaginally twice a week 24 tablet 3    fluticasone (FLONASE) 50 MCG/ACT nasal spray Spray 1 spray in nostril as needed      folic acid (FOLVITE) 1 MG tablet Take 1 tablet (1 mg) by mouth daily 90 tablet 0    hydrocortisone (CORTAID) 1 % external cream Apply topically 2 times daily (Patient taking differently: Apply topically as needed) 60 g 1    insulin glargine (LANTUS PEN) 100 UNIT/ML pen Inject 55 Units Subcutaneous every morning Or as directed. 75 mL 3    insulin pen  needle (BD PEN NEEDLE MARCK 2ND GEN) 32G X 4 MM miscellaneous Use one pen needle daily or as directed. 100 each 3    ketoconazole (NIZORAL) 2 % shampoo Apply topically daily as needed      levocetirizine (XYZAL) 5 MG tablet Take 5 mg by mouth as needed      lisinopril-hydrochlorothiazide (ZESTORETIC) 20-25 MG tablet Take 1 tablet by mouth daily 90 tablet 3    LORazepam (ATIVAN) 0.5 MG tablet Take 1 tablet 30-60 minutes before your scheduled MRI 1 tablet 0    magnesium 250 MG tablet TAKE 1 TABLET(250 MG) BY MOUTH TWICE DAILY 60 tablet 3    metFORMIN (GLUCOPHAGE XR) 500 MG 24 hr tablet Take 4 tablets (2,000 mg) by mouth daily (with dinner) 360 tablet 3    methylPREDNISolone (MEDROL DOSEPAK) 4 MG tablet therapy pack Follow Package Directions 21 tablet 0    metoprolol succinate ER (TOPROL XL) 100 MG 24 hr tablet Take 1 tablet (100 mg) by mouth daily 90 tablet 3    Multiple Vitamin (TAB-A-GERALD) TABS Take 1 tablet by mouth daily 90 tablet 1    nitroGLYcerin (NITROSTAT) 0.4 MG sublingual tablet For chest pain place 1 tablet under the tongue every 5 minutes for 3 doses. If symptoms persist 5 minutes after 1st dose call 911. 30 tablet 11    nitroGLYcerin (NITROSTAT) 0.4 MG sublingual tablet Place 1 tablet (0.4 mg) under the tongue every 5 minutes as needed for chest pain . After 3 doses, if pain persists call 911. 25 tablet 3    olopatadine (PATANOL) 0.1 % ophthalmic solution Place 1 drop into both eyes as needed      pravastatin (PRAVACHOL) 40 MG tablet Take 1 tablet (40 mg) by mouth daily 90 tablet 3    prochlorperazine (COMPAZINE) 5 MG tablet Take 1 tablet (5 mg) by mouth every 6 hours as needed for nausea or vomiting 60 tablet 3    sennosides (SENOKOT) 8.6 MG tablet 1-2 tabs a day as needed for constipation 60 tablet 1    spironolactone (ALDACTONE) 25 MG tablet Take 1 tablet (25 mg) by mouth daily. May also take 0.5 tablets (12.5 mg) daily as needed (for weight gain). 45 tablet 9    sucralfate (CARAFATE) 1 GM tablet Take  "1 tablet (1 g) by mouth 4 times daily 120 tablet 3    terbinafine (LAMISIL) 1 % external cream Apply topically 2 times daily (Patient taking differently: Apply topically as needed) 42 g 1    tiZANidine (ZANAFLEX) 4 MG tablet Take 1 tablet (4 mg) by mouth 3 times daily as needed for muscle spasms 21 tablet 3    traMADol (ULTRAM) 50 MG tablet TAKE 1 TABLET(50 MG) BY MOUTH EVERY 8 HOURS AS NEEDED FOR SEVERE PAIN 60 tablet 3    vitamin D2 (ERGOCALCIFEROL) 95968 units (1250 mcg) capsule Take 1 capsule (50,000 Units) by mouth every 7 days 4 capsule 0    vitamin D3 (CHOLECALCIFEROL) 50 mcg (2000 units) tablet Take 1 tablet (50 mcg) by mouth daily 90 tablet 0     No facility-administered encounter medications on file as of 1/31/2024.       Allergies   Allergen Reactions    Amoxicillin-Pot Clavulanate      Unknown possible hives and edema    Amoxicillin-Pot Clavulanate     Artificial Sweetner (Informational Only)  Other (See Comments)     Increased headache    Azithromycin     Clavulanic Acid     Diatrizoate Other (See Comments)     Pt wants this listed because she is allergic to shellfish     Imitrex [Triptans]      Severe face/neck/chest tightness and flushing side effects     Penicillins Hives     Unknown     Pork Allergy      Stomach pain, cramping, diarrhea, itching, nausea and headaches    Shellfish Allergy Hives and Swelling    Shellfish-Derived Products     Sulfa Antibiotics Hives and Swelling    Sulfatolamide     Zithromax [Azithromycin Dihydrate] Swelling     Unknown        Ph.E:    /78 (BP Location: Left arm, Patient Position: Sitting, Cuff Size: Adult Regular)   Pulse 54   Ht 1.575 m (5' 2\")   Wt 73.5 kg (162 lb)   SpO2 97%   BMI 29.63 kg/m      GA: NAD, pleasant  HEENT: nl conj, sclera, EOMI. Mild campos-orbital edema under R eye  Chest: CTAB  CV: no M/R/G, RRR  Abdomen: soft, NT  MSK: + active synovitis and joint tenderness over B/L 2nd/3rd MCPs  Skin: no rash  Neuro: non focal  Psych: nl " affect    Assessment/Plan:    1-Seropositive non-erosive RA (arthritis, AM stiffness, high CRP, RF 26 but re-check neg 3/2015 on HCQ) Dx 5/2013, FMS, new pleuritic CP 3/20-15-5/2015 resolved on steroids. She is on  mg bid since 5/2014. She tolerates it well and it's helping some but not enough to control all her pain. Continued having flare ups of joint pain, could be GPA related. Some pain is due to FMS.My impression is that her arthralgias are a combination of RA/ IA sec GPA, fibromyalgia and chronic pain.     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 Prednisone was not a good option for her given weight gain, diabetes. She tried and did not tolerate MTX, AZA.    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 Repeat MPO was positive in 6/2017.    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 Shah and Dr. Valdez at [facility]. 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     She is on rituximab since 12/12/2018 with great response, minor allergic reaction which could be managed by pre-meds and extra steroid dose. Developed high BG and vaginal yeast infections after solumedrol premed. Treated candida with fluconazole. Will decrease solumerdol dose to IV 80 mg prior to receiving rituximab. 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 However, feels Rituximab effects wear off around 4 months. According to ACR guidelines can give every 4-6 months. Pt elected to received this upcoming September which will be approximately 5 months from last dose. Repeat Orbit CT in September 2021 demonstrated improvement.     Last visit in April 2022-- Recommended marie given b cell depletion tx as rituximab blocks the response to covid vaccine, she is concerned about side effects. I advised her to discuss with  [facility] pharmacist.      1/25/2023: Janine has been ill since Dx of COVID on 12/17/2022. Her most recent labs were reviewed, stable vasculitis labs in 8/2022 with CD19<1, neg vasculitis markers. In 1/2023, no anemia and nl thyroid function test. I ordered vit D as add on. 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 She does need to follow up on abnormal thyroid US and mammogram as well. 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 I will see her in person, in early march to determine if it is ok to give another dose of rituxiamb. 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 Also ordered shower chairroseanna per her request given significant fatigue, body pain.    3/9/2023: S/p last rituximab 1 gram on 9/7/2022. 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 Stable labs and eye inflammation.    2-Fat pads. She was seen by endocrinology and cushing was ruled out in 4/2014. Was advised to do f/u for enlarged thyroid/thyroid nodules.  3-Hair loss. Continue with dermatology  4-Chronic pain. F/U with pain clinic  5-Vit D deficiency. Was replaced with vit D 50, 000 units po qwk x 12 wk then 2000 units every day  6-?HCQ toxicity on OCT 7/2021, now off HCQ, could explain increased arthralgia  7- Chronic Headaches. Symptoms worsen after taking zofran. Has received compazine in hospital in the past without adverse effect. Will have patient trial Compazine       3/2023 plan:    Spine referral for abnormal C spine (DJD) in 2/2023.    Rituximab 1 gram  one dose only this month    Labs in May 2023    Follow up eye exam 8/2023    Return in person in about 5-6 months       9/6/2023: last rituximab 1 gram one dose for ANCA vasculitis on 3/28/2023, increased arthralgia, will try rituximab at full dose, zanaflex and consider resuming HCQ as last eye exam did rule out HCQ toxicity (HCQ was stopped with concern for possible HCQ toxicity).    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 Will schedule rituximab in Feb, pain mx was discussed. Labs are stable.    S/p rituximab 1 gram on 9/19/2023, 10/3/2023.    Plan:      Rituximab 1 gram every 2 weeks x 2 infusion in Feb for ANCA vasculitis    Labs with next rituximab infusion    X-rays today    Acupuncture referral    Return in person in about 4 months    Orders Placed This Encounter   Procedures    X-ray bl Foot 3+ views    X-ray bl Knee standing 1-2 vw    XR Hand Bilateral 2 Views    AST    ALT    Myeloperoxidase and Proteinase 3 Panel    Albumin level    Creatinine    CRP inflammation    UA with Microscopic reflex to Culture    Protein  random urine    Creatinine random urine    Erythrocyte sedimentation rate auto    CD19 B Cell Count    ANCA IgG by IFA with Reflex to Titer    Immunoglobulins A G and M    Cyclic Citrullinated Peptide Antibody IgG    Rheumatoid factor    Acupuncture Referral    CBC with Platelets & Differential       Majo Mckinnon MD

## 2024-01-31 NOTE — PROGRESS NOTES
Rheumatology F/U In Person Visit Note    Last visit note: 9/6/2023    Today's visit date: 1/31/2024    Reason for in person visit: F/U GPA    HPI     Ms. 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 She is s/p tx with HCQ since 5/2014, now off due to abnormal eye exam. On rituximab since 12/20218 with great response. Previously tried and did not tolerate MTX, AZA.    She had lateral orbitotomy and debulking orbital mass right eye on 9/26/18 with Dr. Shah and Dr. Valdez at [facility]. 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 There were no complications according to the op note.    Today 1/31/2024:      Nortriptyline was prescribed by another provider, but has not tried it yet    R side of her face started swelling again.    Reports pain over neck, hips, knees and hands.          9/6/2023:    -reports pain/swelling over hands/feet, has more arthritis problem over past year  -gets numbness over toes, bottom of feet, can't see neurology till 1/2024  -walking causes pain in the feet  -gets pain over hips after a trip to [facility], it is a new problem  -sometimes gets swelling over R cheek, noticed it yesterday outside with heat, her face was also bright red        3/9/2023: Reports pain/swelling of her hands. S/p last rituximab 1 gram on 9/7/2022.       1/25/2023: Janine is doing a phone visit for follow up, was feeling ill and switched in person to phone visit. She got sick around Thanksgiving, saw her PCP on 12/17, was tested positive for COVID, it took a longtime to feel better, did not take paxlovid as it was already past 2 weeks. Since then, she has not been feeling well. Has headaches, body ache, her eyes especially R eye look pink, they burn and itch a lot. Has sense of smell but can not taste her food. She is very tired, hardly leaves her house. Last time, left house for doctor visit, was tired and  sore. Has tingling and pinprick feeling over arms and legs. Has upcoming follow up with PCP on 2/10. She had thyroid US for thyroid nodules. She had abnormal screening mammogram on 1/9, will go back for diagnostic mammogram and US of L breast on 1/31.          08/19/2022  Reports fatigue and headaches. Headache worsens after taking Zofran for nausea. Joint symptoms come and go. Last rituximab on 4/6/22. Rituximab is helpful but feels like it wears off prior to the 6 months. Next appointment scheduled for October 2022. Develops intermittent vaginal yeast infection and thrush related to rising blood glucose. Right eye without symptoms. No new fevers, chills, skin changes. Son is wondering if she takes herbal supplements for headaches will this interact with any of her medications. Scheduled to meet with pharmacist today.        4/22/2022:   Each rituximab infusion causes vaginal yeast infection and thrush related to rise in BG. Her BG has stayed in higher level since last rituximab. Has more ache and pain, myalgia and arthralgia. Recently has had headaches with high BP. Had last rituximab 1000 mg on 4/6.Her R eye is itching and swelling up.  Today, her BG is 177.Has gained 20 lbs since Feb 2022. Had severe pain in her arm after covid vaccine, it took a month to get better.      12/16/2021: Janine presents for follow up. Reports ESOB with cold, has ongoing joint pain. R eye pain is intermittent.  Reports headaches after rituximab 1 gram on 10/18/2021.  Last week, her R knee was hurting.      8/20/2021: Janine has pain over arms, knees. Some days, feel stiff all day long. Some days can't do stairs. Hard to tell if there is swelling as has more water retention during summer.  Some days, eye swells up like today. No fevers, SOB, CP or cough.  Has rosacea but some days wakes up with heat rash over sun. Her face is peeling.  Sometimes can't lift things or grab things with pain from elbow to hands. Has shoulder and neck pain but  this forearm pain is new.  Stopped HCQ in mid July due to concern for potential HCQ toxicity on OCT, her eye exam with Dr. Vernon has been re-scheduled and upcoming on 9/14 to address HCQ toxicity, pain is not worse off HCQ.  Not COVID vaccinated but is cautious.      5/10/2021: Joint ache, knees hurt, R elbow hurts, R arm hurts, R hand hurts. Has lots of swelling in her joints especially hands.    Depending on weather, joints jhurt, sometimes pain is 7/10.  Has problem with taking stairs and balance.  Gets off balance.   R eye gets tinglin, swelling.  Gets out of breath, gets worse with seasonal allergies.  Over past month and half, took nitro more, like 8 times.  No hemooptysis, no hematuria.  Has allergies.      4/21/2021: Had last rituximab 1 gram on 1/19, 2/2/2021. Reports rituximab starts to wear off earlier, has more sx this time. Eye swelling is intermittent. Gets indigestion/ GERD sx with constipation after the infusion.  She reports having asthma, swelling of neck, arms. She thinks that it could be from her vasculitis. She is worried about going down on rituximab dose.   Otherwise unchanged sx, no SOB or hemoptysis or persistent cough, or   Somedays her knees and hips hurt a lot, feel like bone on bone in her knees, especially on changing position.      Component      Latest Ref Rng 2/28/2013 2/28/2013          12:14 PM 12:14 PM   WBC      4.0 - 11.0 10e9/L     RBC Count      3.8 - 5.2 10e12/L     Hemoglobin      11.7 - 15.7 g/dL     Hematocrit      35.0 - 47.0 %     MCV      78 - 100 fl     MCH      26.5 - 33.0 pg     MCHC      31.5 - 36.5 g/dL     RDW      10.0 - 15.0 %     Platelet Count      150 - 450 10e9/L     Specimen Description           Lyme Screen IgG and IgM           Vitamin D Deficiency screening      30 - 75 ug/L     Ferritin      10 - 300 ng/mL     Sed Rate      0 - 20 mm/h     ALLI Screen by EIA      <1.0     Rheumatoid Factor      0 - 14 IU/mL     CK Total      30 - 225 U/L  78   Uric Acid       2.5 - 7.5 mg/dL 6.7      Component      Latest Ref Rng 2/28/2013          12:14 PM   WBC      4.0 - 11.0 10e9/L    RBC Count      3.8 - 5.2 10e12/L    Hemoglobin      11.7 - 15.7 g/dL    Hematocrit      35.0 - 47.0 %    MCV      78 - 100 fl    MCH      26.5 - 33.0 pg    MCHC      31.5 - 36.5 g/dL    RDW      10.0 - 15.0 %    Platelet Count      150 - 450 10e9/L    Specimen Description       Serum   Lyme Screen IgG and IgM       Test value: <0.75....Interpretation: Negative....If you highly suspect Lyme . . .   Vitamin D Deficiency screening      30 - 75 ug/L    Ferritin      10 - 300 ng/mL    Sed Rate      0 - 20 mm/h    ALLI Screen by EIA      <1.0    Rheumatoid Factor      0 - 14 IU/mL    CK Total      30 - 225 U/L    Uric Acid      2.5 - 7.5 mg/dL      Component      Latest Ref Rng 2/28/2013 2/28/2013 2/28/2013 2/28/2013          12:14 PM 12:14 PM 12:14 PM 12:14 PM   WBC      4.0 - 11.0 10e9/L       RBC Count      3.8 - 5.2 10e12/L       Hemoglobin      11.7 - 15.7 g/dL       Hematocrit      35.0 - 47.0 %       MCV      78 - 100 fl       MCH      26.5 - 33.0 pg       MCHC      31.5 - 36.5 g/dL       RDW      10.0 - 15.0 %       Platelet Count      150 - 450 10e9/L       Specimen Description             Lyme Screen IgG and IgM             Vitamin D Deficiency screening      30 - 75 ug/L       Ferritin      10 - 300 ng/mL    10   Sed Rate      0 - 20 mm/h   23 (H)    ALLI Screen by EIA      <1.0  <1.0 . . .     Rheumatoid Factor      0 - 14 IU/mL 26 (H)      CK Total      30 - 225 U/L       Uric Acid      2.5 - 7.5 mg/dL         5/2013  CBC WITH PLATELETS DIFFERENTIAL       Component Value Range    WBC 11.3 (*) 4.0 - 11.0 10e9/L    RBC Count 4.56  3.8 - 5.2 10e12/L    Hemoglobin 11.1 (*) 11.7 - 15.7 g/dL    Hematocrit 34.3 (*) 35.0 - 47.0 %    MCV 75 (*) 78 - 100 fl    MCH 24.3 (*) 26.5 - 33.0 pg    MCHC 32.4  31.5 - 36.5 g/dL    RDW 16.1 (*) 10.0 - 15.0 %    Platelet Count 315  150 - 450 10e9/L    Diff Method  Automated Method      % Neutrophils 71.6  40 - 75 %    % Lymphocytes 20.9  20 - 48 %    % Monocytes 4.3  0 - 12 %    % Eosinophils 2.8  0 - 6 %    % Basophils 0.2  0 - 2 %    % Immature Granulocytes 0.2  0 - 0.4 %    Absolute Neutrophil 8.1  1.6 - 8.3 10e9/L    Absolute Lymphocytes 2.4  0.8 - 5.3 10e9/L    Absolute Monoctyes 0.5  0.0 - 1.3 10e9/L    Absolute Eosinophils 0.3  0.0 - 0.7 10e9/L    Absolute Basophils 0.0  0.0 - 0.2 10e9/L    Abs Immature Granulocytes 0.0  0 - 0.03 10e9/L   AMYLASE       Component Value Range    Amylase 103  30 - 110 U/L   COMPREHENSIVE METABOLIC PANEL       Component Value Range    Sodium 144  133 - 144 mmol/L    Potassium 3.8  3.4 - 5.3 mmol/L    Chloride 105  94 - 109 mmol/L    Carbon Dioxide 23  20 - 32 mmol/L    Anion Gap 17  6 - 17 mmol/L    Glucose 173 (*) 60 - 99 mg/dL    Urea Nitrogen 13  5 - 24 mg/dL    Creatinine 0.83  0.52 - 1.04 mg/dL    GFR Estimate 74  >60 mL/min/1.7m2    GFR Estimate If Black 90  >60 mL/min/1.7m2    Calcium 9.7  8.5 - 10.4 mg/dL    Bilirubin Total 0.4  0.2 - 1.3 mg/dL    Albumin 4.3  3.9 - 5.1 g/dL    Protein Total 7.8  6.8 - 8.8 g/dL    Alkaline Phosphatase 66  40 - 150 U/L    ALT 36  0 - 50 U/L    AST 28  0 - 45 U/L   CK TOTAL       Component Value Range    CK Total 66  30 - 225 U/L   CRP INFLAMMATION       Component Value Range    CRP Inflammation 10.4 (*) 0.0 - 8.0 mg/L   LIPASE       Component Value Range    Lipase 353 (*) 20 - 250 U/L   ERYTHROCYTE SEDIMENTATION RATE AUTO       Component Value Range    Sed Rate 26 (*) 0 - 20 mm/h   ROUTINE UA WITH MICROSCOPIC REFLEX TO CULTURE       Component Value Range    Color Urine Yellow      Appearance Urine Slightly Cloudy      Glucose Urine 30 (*) NEG mg/dL    Bilirubin Urine Negative  NEG    Ketones Urine 5 (*) NEG mg/dL    Specific Gravity Urine 1.026  1.003 - 1.035    Blood Urine Trace (*) NEG    pH Urine 5.0  5.0 - 7.0 pH    Protein Albumin Urine 10 (*) NEG mg/dL    Urobilinogen mg/dL Normal  0.0 - 2.0  mg/dL    Nitrite Urine Negative  NEG    Leukocyte Esterase Urine Negative  NEG    Source Midstream Urine      WBC Urine 1  0 - 2 /HPF    RBC Urine 4 (*) 0 - 2 /HPF    Squamous Epithelial /HPF Urine <1  0 - 1 /HPF    Mucous Urine Present (*) NEG /LPF    Hyaline Casts 3 (*) 0 - 2 /LPF    Calcium Oxalate Moderate (*) NEG /HPF   COMPLEMENT C3       Component Value Range    Complement C3 143  76 - 169 mg/dL   COMPLEMENT C4       Component Value Range    Complement C4 31  15 - 50 mg/dL   HEPATITIS C ANTIBODY       Component Value Range    Hepatitis C Antibody Negative  NEG   CARDIOLIPIN ANTIBODY IGG AND IGM       Component Value Range    Cardiolipin IgG Marline    0 - 15.0 GPL    Value: <15.0      Interpretation:  Negative    Cardiolipin IgM Marline    0 - 12.5 MPL    Value: <12.5      Interpretation:  Negative   LUPUS PANEL       Component Value Range    Lupus Result    NEG    Value: Negative      (Note)      COMMENTS:      The INR is normal.      APTT is normal.  1:2 Mix is not indicated.      DRVVT Screen is normal.      Thrombin time is normal.      NEGATIVE TEST; A LUPUS ANTICOAGULANT WAS NOT DETECTED IN THIS      SPECIMEN WITHIN THE LIMITS OF THE TESTING REPERTOIRE.      If the clinical picture is strongly suggestive of an antiphospholipid      syndrome, recommend anticardiolipin and beta-2-glycoprotein (IgG and      IgM) antibody tests.      Leela Franks M.D.  [phone]      5/2/2013            INR =  0.93 N = 0.86-1.14  (No additional charge)      TT = 15.7 N = 13.0-19.0 sec  (No additional charge)            APTT'S:    Seconds      Reagent =  Stago LA      Normal  =  38      Patient  =  34      1:2 Mix  =  N/A      Reference =  31-43             DILUTE MADELINE VIPER VENOM TEST:      DRVVT Screen Ratio = 0.76 Normal = <1.21         IMMUNOGLOBULIN G SUBCLASSES       Component Value Range    IGG 1030  695 - 1620 mg/dL    IgG1 488  300 - 856 mg/dL    IgG2 325  158 - 761 mg/dL    IgG3 47  24 - 192 mg/dL    IgG4 18   11 - 86 mg/dL   SUNNY ANTIBODY PANEL       Component Value Range    Ribonucleic Protein IgG Antibody 0      Smith Antibody IgG 1      SSA (RO) Antibody IgG 4      SSB (LA) Antibody IgG 0      Scleroderma Antibody IgG 0     BETA 2 GLYCOPROTEIN ANTIBODIES IGG IGM       Component Value Range    Beta-2-Glycopro IgG 1      Beta-2-Glycopro IgM 5     CYCLIC CIT PEPT IGG       Component Value Range    Cyclic Cit Pept IgG/IgA    <20 UNITS    Value: <20      Interpretation:  Negative   DNA DOUBLE STRANDED ANTIBODIES       Component Value Range    DNA-ds    0 - 29 IU/mL    Value: <15      Interpretation:  Negative       CT CHEST PULMONARY EMBOLISM W CONTRAST 5/20/2015 4:57 PM  HISTORY: Pain. SOB. Elevated d-dimer.   TECHNIQUE: 65 mL Isovue 370. Axial images with coronal  reconstructions.  COMPARISON: None.  FINDINGS: Calcified granuloma with surrounding scarring in the  posterolateral left upper lobe. Triangular shaped opacity at the right  lung base in the lateral right middle lobe could represent some  scarring, atelectasis or infiltrate. There is also some scarring or  atelectasis in the posteromedial right lung base. Lungs otherwise  clear.  The pulmonary arteries are well opacified. No CT evidence for acute  pulmonary embolus. No aortic dissection.  Diffuse fatty infiltration of the liver.  IMPRESSION  IMPRESSION:   1. No pulmonary embolus identified.  2. Small focus of atelectasis, infiltrate or scarring in the lateral  right middle lobe.  3. Old granulomatous disease.  4. Otherwise negative.  SILVERIO VAZQUEZ MD    Copath Report      Patient Name: FAVIO MARTINEZ   MR#: [id number]   Specimen #: [id number]   Collected: 3/15/2016   Received: 3/15/2016   Reported: 3/17/2016 13:33   Ordering Phy(s): PARVEEN ENRIQUEZ     ORIGINAL REPORT FOLLOWS ADDENDUM  ADDENDUM     TO ORIGINAL REPORT   Status: Signed Out   Date Ordered:3/18/2016   Date Complete:3/18/2016   Date Reported:3/18/2016 12:06   Signed Out By: Marianne Godfrey MD  "    INTERPRETATION:   This addendum is done to incorporate the results of fungal (GMS) stains   done on the specimen.  Specimen is negative for fungal organisms.  The   original final diagnosis remains unchanged.     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  Negative for malignancy(Please   see microscopic description)     Electronically signed out by:     Marianne Godfrey MD     CLINICAL HISTORY:   right orbital mass     GROSS:   The specimen is received labeled \"right orbital biopsy\" and consists of   red-tan nodule measuring 1.5 x 0.9 x 0.6 cm with one smooth side and   opposite rough side consistent with periosteum.  The specimen is   bisected revealing homogenous pale tan fleshy cut surface.  Touch   preparations are prepared, air dried and fixed, portion of specimen is   submitted in RPMI for possible lymphoma workup [facility],   ([employer], [place], [place] ).  The remainder is entirely submitted.   (Dictated by: Marianne Godfrey MD 3/15/2016 04:45 PM)     MICROSCOPIC:     Specimen consists of portion of lacrimal gland with acute and chronic   inflammation.  Focal area of microabscess formation associated with   small areas of necrosis are also present.  Inflammation is seen   extending to the periorbital adipose tissue forming panniculitis.   Specimen is negative for malignancy.  Samples sent for immunophenotyping   to [facility], ("[employer] [facility], [place], [place] ) reveals no evidence   of monoclonality or aberrant antigen expression.  A GMS (fungal) stain   is pending and results will be reported as an addendum.     CPT Codes:   A: 73206-IR4, 35941-RWWZF, SOH, 63958-NFQWG, 15807-SFXX     TESTING LAB LOCATION:   [address]  " [id number] [phone]     COLLECTION SITE:   Client: [facility]   Location: [facility] (S)            Component      Latest Ref Rng 4/29/2016   Nucleated RBCs      0 /100 0   Absolute Neutrophil      1.6 - 8.3 10e9/L 8.9 (H)   Absolute Lymphocytes      0.8 - 5.3 10e9/L 3.2   Absolute Monocytes      0.0 - 1.3 10e9/L 0.8   Absolute Eosinophils      0.0 - 0.7 10e9/L 0.2   Absolute Basophils      0.0 - 0.2 10e9/L 0.1   Abs Immature Granulocytes      0 - 0.4 10e9/L 0.1   Absolute Nucleated RBC       0.0   IGG      695 - 1620 mg/dL 836   IgG1      300 - 856 mg/dL 280 (L)   IgG2      158 - 761 mg/dL 277   IgG3      24 - 192 mg/dL 35   IgG4      11 - 86 mg/dL 16   RNP Antibody IgG      0.0 - 0.9 AI <0.2 . . .   Smith SUNNY Antibody IgG      0.0 - 0.9 AI <0.2 . . .   SSA (Ro) (SUNNY) Antibody, IgG      0.0 - 0.9 AI <0.2 . . .   SSB (La) (SUNNY) Antibody, IgG      0.0 - 0.9 AI <0.2 . . .   Scleroderma Antibody Scl-70 SUNNY IgG      0.0 - 0.9 AI <0.2 . . .   Cholesterol      <200 mg/dL 154   Triglycerides      <150 mg/dL 220 (H)   HDL Cholesterol      >49 mg/dL 42 (L)   LDL Cholesterol Calculated      <100 mg/dL 67   Non HDL Cholesterol      <130 mg/dL 111   M Tuberculosis Result      NEG Negative   M Tuberculosis Antigen Value       0.00   Albumin      3.4 - 5.0 g/dL 4.3   ALT      0 - 50 U/L 30   AST      0 - 45 U/L 10   Complement C3      76 - 169 mg/dL 157   Complement C4      15 - 50 mg/dL 32   CRP Inflammation      0.0 - 8.0 mg/L <2.9   Sed Rate      0 - 20 mm/h 2   DNA-ds      <10 IU/mL 1   Cyclic Citrullinated Peptide Antibody, IgG      <7 U/mL 1   Rheumatoid Factor      <20 IU/mL <20   Proteinase 3 Antibody IgG      0.0 - 0.9 AI <0.2 . . .   Myeloperoxidase Antibody IgG      0.0 - 0.9 AI 2.5 (H)   Vitamin D Deficiency screening      20 - 75 ug/L 24   Hemoglobin A1C      4.3 - 6.0 % 7.9 (H)   ALLI by IFA IgG       1:40 (A) . . .     Component      Latest Ref Rng & Units 1/16/2021   WBC      4.0 - 11.0 10e9/L     RBC Count      3.8 - 5.2 10e12/L    Hemoglobin      11.7 - 15.7 g/dL    Hematocrit      35.0 - 47.0 %    MCV      78 - 100 fl    MCH      26.5 - 33.0 pg    MCHC      31.5 - 36.5 g/dL    RDW      10.0 - 15.0 %    Platelet Count      150 - 450 10e9/L    Diff Method          % Neutrophils      %    % Lymphocytes      %    % Monocytes      %    % Eosinophils      %    % Basophils      %    % Immature Granulocytes      %    Nucleated RBCs      0 /100    Absolute Neutrophil      1.6 - 8.3 10e9/L    Absolute Lymphocytes      0.8 - 5.3 10e9/L    Absolute Monocytes      0.0 - 1.3 10e9/L    Absolute Eosinophils      0.0 - 0.7 10e9/L    Absolute Basophils      0.0 - 0.2 10e9/L    Abs Immature Granulocytes      0 - 0.4 10e9/L    Absolute Nucleated RBC          IGG      610 - 1,616 mg/dL    IGA      84 - 499 mg/dL    IGM      35 - 242 mg/dL    Creatinine      0.52 - 1.04 mg/dL    GFR Estimate      >60 mL/min/1.73:m2    GFR Estimate If Black      >60 mL/min/1.73:m2    COVID-19 Virus PCR to [facility] - Source       Nasopharyngeal   COVID-19 Virus PCR to [facility] - Result       Not Detected   Neutrophil Cytoplasmic Antibody      <1:10 titer    Neutrophil Cytoplasmic Antibody Pattern          CD19 B Cells      6 - 27 %    Absolute CD19      107 - 698 cells/uL    Myeloperoxidase Antibody IgG      0.0 - 0.9 AI    Proteinase 3 Antibody IgG      0.0 - 0.9 AI    Vitamin D Deficiency screening      20 - 75 ug/L    Sed Rate      0 - 30 mm/h    CRP Inflammation      0.0 - 8.0 mg/L    Albumin      3.4 - 5.0 g/dL    ALT      0 - 50 U/L    AST      0 - 45 U/L      Component      Latest Ref Rng & Units 5/7/2021   WBC      4.0 - 11.0 10e9/L 8.9   RBC Count      3.8 - 5.2 10e12/L 4.89   Hemoglobin      11.7 - 15.7 g/dL 12.7   Hematocrit      35.0 - 47.0 % 39.7   MCV      78 - 100 fl 81   MCH      26.5 - 33.0 pg 26.0 (L)   MCHC      31.5 - 36.5 g/dL 32.0   RDW      10.0 - 15.0 % 13.7   Platelet Count      150 - 450 10e9/L 336   Diff Method        Automated Method   % Neutrophils      % 65.3   % Lymphocytes      % 20.7   % Monocytes      % 7.8   % Eosinophils      % 5.3   % Basophils      % 0.7   % Immature Granulocytes      % 0.2   Nucleated RBCs      0 /100 0   Absolute Neutrophil      1.6 - 8.3 10e9/L 5.8   Absolute Lymphocytes      0.8 - 5.3 10e9/L 1.8   Absolute Monocytes      0.0 - 1.3 10e9/L 0.7   Absolute Eosinophils      0.0 - 0.7 10e9/L 0.5   Absolute Basophils      0.0 - 0.2 10e9/L 0.1   Abs Immature Granulocytes      0 - 0.4 10e9/L 0.0   Absolute Nucleated RBC       0.0   IGG      610 - 1,616 mg/dL 649   IGA      84 - 499 mg/dL 199   IGM      35 - 242 mg/dL 36   Creatinine      0.52 - 1.04 mg/dL 0.96   GFR Estimate      >60 mL/min/1.73:m2 66   GFR Estimate If Black      >60 mL/min/1.73:m2 77   COVID-19 Virus PCR to [facility] - Source          COVID-19 Virus PCR to [facility] - Result          Neutrophil Cytoplasmic Antibody      <1:10 titer <1:10   Neutrophil Cytoplasmic Antibody Pattern       The ANCA IFA is <1:10.  No further testing will be performed.   CD19 B Cells      6 - 27 % <1 (L)   Absolute CD19      107 - 698 cells/uL <1 (L)   Myeloperoxidase Antibody IgG      0.0 - 0.9 AI 1.1 (H)   Proteinase 3 Antibody IgG      0.0 - 0.9 AI <0.2   Vitamin D Deficiency screening      20 - 75 ug/L 41   Sed Rate      0 - 30 mm/h 9   CRP Inflammation      0.0 - 8.0 mg/L <2.9   Albumin      3.4 - 5.0 g/dL 4.1   ALT      0 - 50 U/L 28   AST      0 - 45 U/L 18     Lab on 07/08/2022   Component Date Value Ref Range Status     Sodium 07/08/2022 143  133 - 144 mmol/L Final     Potassium 07/08/2022 3.9  3.4 - 5.3 mmol/L Final     Chloride 07/08/2022 108  94 - 109 mmol/L Final     Carbon Dioxide (CO2) 07/08/2022 25  20 - 32 mmol/L Final     Anion Gap 07/08/2022 10  3 - 14 mmol/L Final     Urea Nitrogen 07/08/2022 17  7 - 30 mg/dL Final     Creatinine 07/08/2022 1.01  0.52 - 1.04 mg/dL Final     Calcium 07/08/2022 9.3  8.5 - 10.1 mg/dL Final     Glucose 07/08/2022 103  (A) 70 - 99 mg/dL Final     GFR Estimate 07/08/2022 65  >60 mL/min/1.73m2 Final    Effective December 21, 2021 eGFRcr in adults is calculated using the 2021 CKD-EPI creatinine equation which includes age and gender (Ryan et al., [place], DOI: 10.1056/SGSYiu8289540)     WBC Count 07/08/2022 12.0 (A) 4.0 - 11.0 10e3/uL Final     RBC Count 07/08/2022 4.94  3.80 - 5.20 10e6/uL Final     Hemoglobin 07/08/2022 12.6  11.7 - 15.7 g/dL Final     Hematocrit 07/08/2022 39.6  35.0 - 47.0 % Final     MCV 07/08/2022 80  78 - 100 fL Final     MCH 07/08/2022 25.5 (A) 26.5 - 33.0 pg Final     MCHC 07/08/2022 31.8  31.5 - 36.5 g/dL Final     RDW 07/08/2022 13.6  10.0 - 15.0 % Final     Platelet Count 07/08/2022 350  150 - 450 10e3/uL Final     % Neutrophils 07/08/2022 66  % Final     % Lymphocytes 07/08/2022 22  % Final     % Monocytes 07/08/2022 9  % Final     % Eosinophils 07/08/2022 3  % Final     % Basophils 07/08/2022 0  % Final     % Immature Granulocytes 07/08/2022 0  % Final     NRBCs per 100 WBC 07/08/2022 0  <1 /100 Final     Absolute Neutrophils 07/08/2022 7.9  1.6 - 8.3 10e3/uL Final     Absolute Lymphocytes 07/08/2022 2.7  0.8 - 5.3 10e3/uL Final     Absolute Monocytes 07/08/2022 1.1  0.0 - 1.3 10e3/uL Final     Absolute Eosinophils 07/08/2022 0.3  0.0 - 0.7 10e3/uL Final     Absolute Basophils 07/08/2022 0.1  0.0 - 0.2 10e3/uL Final     Absolute Immature Granulocytes 07/08/2022 0.0  <=0.4 10e3/uL Final     Absolute NRBCs 07/08/2022 0.0  10e3/uL Final     Component      Latest Ref Rng & Units 8/19/2022   WBC      4.0 - 11.0 10e3/uL 12.6 (H)   RBC Count      3.80 - 5.20 10e6/uL 5.06   Hemoglobin      11.7 - 15.7 g/dL 12.8   Hematocrit      35.0 - 47.0 % 40.4   MCV      78 - 100 fL 80   MCH      26.5 - 33.0 pg 25.3 (L)   MCHC      31.5 - 36.5 g/dL 31.7   RDW      10.0 - 15.0 % 14.1   Platelet Count      150 - 450 10e3/uL 387   % Neutrophils      % 71   % Lymphocytes      % 20   % Monocytes      % 6   % Eosinophils      % 3    % Basophils      % 0   % Immature Granulocytes      % 0   NRBCs per 100 WBC      <1 /100 0   Absolute Neutrophils      1.6 - 8.3 10e3/uL 8.8 (H)   Absolute Lymphocytes      0.8 - 5.3 10e3/uL 2.5   Absolute Monocytes      0.0 - 1.3 10e3/uL 0.8   Absolute Eosinophils      0.0 - 0.7 10e3/uL 0.4   Absolute Basophils      0.0 - 0.2 10e3/uL 0.1   Absolute Immature Granulocytes      <=0.4 10e3/uL 0.1   Absolute NRBCs      10e3/uL 0.0   Color Urine      Colorless, Straw, Light Yellow, Yellow Straw   Appearance Urine      Clear Clear   Glucose Urine      Negative mg/dL 1000 (A)   Bilirubin Urine      Negative Negative   Ketones Urine      Negative mg/dL Negative   Specific Gravity Urine      1.003 - 1.035 1.020   Blood Urine      Negative Negative   pH Urine      5.0 - 7.0 5.0   Protein Albumin Urine      Negative mg/dL Negative   Urobilinogen mg/dL      Normal, 2.0 mg/dL Normal   Nitrite Urine      Negative Negative   Leukocyte Esterase Urine      Negative Negative   RBC Urine      <=2 /HPF 1   WBC Urine      <=5 /HPF 5   Squamous Epithelial /HPF Urine      <=1 /HPF <1   MPO Marline IgG Instrument Value      <3.5 U/mL 0.7   Myeloperoxidase Antibody IgG      Negative Negative   Proteinase 3 Marline IgG Instrument Value      <2.0 U/mL <1.0   Proteinase 3 Antibody IgG      Negative Negative   Protein Random Urine      g/L 0.11   Protein Total Urine g/gr Creatinine      0.00 - 0.20 g/g Cr 0.09   Creatinine Urine      mg/dL 128   IGG      610 - 1,616 mg/dL 641   IGA      84 - 499 mg/dL 204   IGM      35 - 242 mg/dL 32 (L)   Creatinine      0.52 - 1.04 mg/dL 1.24 (H)   GFR Estimate      >60 mL/min/1.73m2 51 (L)   CD19 B Cells      6 - 27 % <1 (L)   Absolute CD19      107 - 698 cells/uL <1 (L)   Neutrophil Cytoplasmic Antibody      <1:10 <1:10   Neutrophil Cytoplasmic Antibody Pattern       The ANCA IFA is <1:10.  No further testing will be performed.   AST      0 - 45 U/L 16   ALT      0 - 50 U/L 34   Albumin      3.4 - 5.0 g/dL 4.2    CRP Inflammation      0.0 - 8.0 mg/L 9.1 (H)   Sed Rate      0 - 30 mm/hr 15   Vitamin D Deficiency screening      20 - 75 ug/L 36     Component      Latest Ref Rng & Units 1/19/2023   WBC      4.0 - 11.0 10e3/uL 16.1 (H)   RBC Count      3.80 - 5.20 10e6/uL 5.39 (H)   Hemoglobin      11.7 - 15.7 g/dL 13.4   Hematocrit      35.0 - 47.0 % 41.9   MCV      78 - 100 fL 78   MCH      26.5 - 33.0 pg 24.9 (L)   MCHC      31.5 - 36.5 g/dL 32.0   RDW      10.0 - 15.0 % 15.4 (H)   Platelet Count      150 - 450 10e3/uL 383   % Neutrophils      % 79   % Lymphocytes      % 16   % Monocytes      % 4   % Eosinophils      % 1   % Basophils      % 0   % Immature Granulocytes      % 0   NRBCs per 100 WBC      <1 /100 0   Absolute Neutrophils      1.6 - 8.3 10e3/uL 12.6 (H)   Absolute Lymphocytes      0.8 - 5.3 10e3/uL 2.6   Absolute Monocytes      0.0 - 1.3 10e3/uL 0.7   Absolute Eosinophils      0.0 - 0.7 10e3/uL 0.2   Absolute Basophils      0.0 - 0.2 10e3/uL 0.1   Absolute Immature Granulocytes      <=0.4 10e3/uL 0.1   Absolute NRBCs      10e3/uL 0.0   Sodium      136 - 145 mmol/L 137   Potassium      3.4 - 5.3 mmol/L 4.0   Chloride      98 - 107 mmol/L 98   Carbon Dioxide (CO2)      22 - 29 mmol/L 25   Anion Gap      7 - 15 mmol/L 14   Urea Nitrogen      6.0 - 20.0 mg/dL 23.6 (H)   Creatinine      0.51 - 0.95 mg/dL 1.09 (H)   Calcium      8.6 - 10.0 mg/dL 10.3 (H)   Glucose      70 - 99 mg/dL 232 (H)   Alkaline Phosphatase      35 - 104 U/L 95   AST      10 - 35 U/L 23   ALT      10 - 35 U/L 26   Protein Total      6.4 - 8.3 g/dL 7.7   Albumin      3.5 - 5.2 g/dL 4.9   Bilirubin Total      <=1.2 mg/dL 0.3   GFR Estimate      >60 mL/min/1.73m2 59 (L)   Color Urine      Colorless, Straw, Light Yellow, Yellow Light Yellow   Appearance Urine      Clear Clear   Glucose Urine      Negative mg/dL >=1000 (A)   Bilirubin Urine      Negative Negative   Ketones Urine      Negative mg/dL 10 (A)   Specific Gravity Urine      1.003 - 1.035  1.033   Blood Urine      Negative Negative   pH Urine      5.0 - 7.0 6.0   Protein Albumin Urine      Negative mg/dL Negative   Urobilinogen mg/dL      Normal, 2.0 mg/dL Normal   Nitrite Urine      Negative Negative   Leukocyte Esterase Urine      Negative Negative   Iron      37 - 145 ug/dL 51   Iron Binding Capacity      240 - 430 ug/dL 362   Iron Sat Index      15 - 46 % 14 (L)   Parathyroid Hormone Intact      15 - 65 pg/mL 51   Calcium Ionized Whole Blood      4.4 - 5.2 mg/dL 4.9   Ferritin      11 - 328 ng/mL 70   TSH      0.30 - 4.20 uIU/mL 0.40   3 views cervical spine radiographs 2/10/2023 4:10 PM     History: neck pain; Neck pain     Comparison: MRI cervical spine 4/14/2015.     Findings:  AP, lateral, and odontoid views of the cervical spine were obtained.     Down to the level of C7 is well visualized on the lateral view(s). The  cervicothoracic junction is partially visualized.     There is no acute osseous abnormality. No prevertebral soft tissue  swelling. Normal cervical lordosis is maintained.       Moderate loss of intervertebral disc height at C6-7. Mild loss of disc  height at C5-6. Additional multilevel degenerative changes including  endplate osteophytosis and uncinate hypertrophy. Dens is obscured by  the overlying maxilla on the odontoid view.     The visualized lung apices are clear.                                                                      Impression:  Multilevel cervical degenerative changes, greatest at C6-7.     I have personally reviewed the examination and initial interpretation  and I agree with the findings.     TASHI KELLY MD      Component      Latest Ref Rng & Units 2/10/2023   WBC      4.0 - 11.0 10e3/uL 11.9 (H)   RBC Count      3.80 - 5.20 10e6/uL 5.30 (H)   Hemoglobin      11.7 - 15.7 g/dL 13.2   Hematocrit      35.0 - 47.0 % 40.7   MCV      78 - 100 fL 77 (L)   MCH      26.5 - 33.0 pg 24.9 (L)   MCHC      31.5 - 36.5 g/dL 32.4   RDW      10.0 - 15.0 % 15.3 (H)    Platelet Count      150 - 450 10e3/uL 415   % Neutrophils      % 69   % Lymphocytes      % 23   % Monocytes      % 6   % Eosinophils      % 2   % Basophils      % 0   % Immature Granulocytes      % 0   NRBCs per 100 WBC      <1 /100 0   Absolute Neutrophils      1.6 - 8.3 10e3/uL 8.1   Absolute Lymphocytes      0.8 - 5.3 10e3/uL 2.7   Absolute Monocytes      0.0 - 1.3 10e3/uL 0.8   Absolute Eosinophils      0.0 - 0.7 10e3/uL 0.2   Absolute Basophils      0.0 - 0.2 10e3/uL 0.0   Absolute Immature Granulocytes      <=0.4 10e3/uL 0.0   Absolute NRBCs      10e3/uL 0.0   Sodium      136 - 145 mmol/L 138   Potassium      3.4 - 5.3 mmol/L 4.1   Chloride      98 - 107 mmol/L 99   Carbon Dioxide (CO2)      22 - 29 mmol/L 23   Anion Gap      7 - 15 mmol/L 16 (H)   Urea Nitrogen      6.0 - 20.0 mg/dL 24.6 (H)   Creatinine      0.51 - 0.95 mg/dL 1.07 (H)   Calcium      8.6 - 10.0 mg/dL 10.5 (H)   Glucose      70 - 99 mg/dL 205 (H)   Alkaline Phosphatase      35 - 104 U/L 86   AST      10 - 35 U/L 26   ALT      10 - 35 U/L 30   Protein Total      6.4 - 8.3 g/dL 7.6   Albumin      3.5 - 5.2 g/dL 4.8   Bilirubin Total      <=1.2 mg/dL 0.2   GFR Estimate      >60 mL/min/1.73m2 61   MPO Marline IgG Instrument Value      <3.5 U/mL 0.8   Myeloperoxidase Antibody IgG      Negative Negative   Proteinase 3 Marline IgG Instrument Value      <2.0 U/mL <1.0   Proteinase 3 Antibody IgG      Negative Negative   IGG      610 - 1,616 mg/dL 680   IGA      84 - 499 mg/dL 197   IGM      35 - 242 mg/dL 30 (L)   CD19 B Cells      6 - 27 % <1 (L)   Absolute CD19      107 - 698 cells/uL <1 (L)   Neutrophil Cytoplasmic Antibody      <1:10 <1:10   Neutrophil Cytoplasmic Antibody Pattern       The ANCA IFA is <1:10.  No further testing will be performed.   % Retic      0.5 - 2.0 % 1.4   Absolute Retic      0.025 - 0.095 10e6/uL 0.073   CRP Inflammation      <5.00 mg/L 6.67 (H)   Sed Rate      0 - 30 mm/hr 12   Lipase      13 - 60 U/L 26     ROS: A  comprehensive ROS was done. Positives are per HPI.      HISTORY REVIEW:  Past Medical History:   Diagnosis Date     Abnormal glandular Papanicolaou smear of cervix      Allergic rhinitis     Allergy, airborne subst     Arthritis      ASCVD (arteriosclerotic cardiovascular disease)      Chronic pain      Chronic pancreatitis (H)     idiopathic, Tx: PPI, antioxidants, pancreatic enzymes     Common migraine      Coronary artery disease      Costochondritis      Difficulty in walking(719.7)      Dyspnea on exertion      Ectasia, mammary duct     [facility] n[facility] discharge     Elevated fasting glucose      Gastro-oesophageal reflux disease      Granulomatosis with polyangiitis (H)      Hernia      History of angina      Hyperlipidemia LDL goal < 100      Hypertension goal BP (blood pressure) < 140/90     Essential hypertension     Iron deficiency anemia      Ischemic cardiomyopathy      Menorrhagia      Migraine headaches      Mild persistent asthma      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 Matias, neg w/u, Rx'd w/prednisone     Ventral hernia, unspecified, without mention of obstruction or gangrene 2012     Past Surgical History:   Procedure Laterality Date     C/SECTION, LOW TRANSVERSE  1996         CARDIAC  SURGERY      cardiac stent- recent in 2/9/16; 4 other stents     COLONOSCOPY N/A 5/25/2023    Procedure: COLONOSCOPY, WITH POLYPECTOMY;  Surgeon: Percy Gross MD;  Location: [facility] OR     DILATION AND CURETTAGE N/A 07/06/2016    Procedure: DILATION AND CURETTAGE;  Surgeon: Nahed Butler MD;  [facility]     ENDOMETRIAL SAMPLING (BIOPSY) N/A 4/28/2023    Procedure: ENDOMETRIAL BIOPSY;  Surgeon: Nahed Butler MD;  [facility]     ESOPHAGOSCOPY, GASTROSCOPY, DUODENOSCOPY (EGD), COMBINED N/A 03/31/2022    Procedure: ESOPHAGOGASTRODUODENOSCOPY, WITH BIOPSY;  Surgeon: Enzo Sesay MD;  [facility]: [facility]     ESOPHAGOSCOPY, GASTROSCOPY, DUODENOSCOPY (EGD), COMBINED N/A 5/25/2023    Procedure: ESOPHAGOGASTRODUODENOSCOPY, WITH BIOPSY;  Surgeon: Percy Gross MD;  Location: [facility] OR     EXAM UNDER ANESTHESIA PELVIC N/A 4/28/2023    Procedure: EXAM UNDER ANESTHESIA;  Surgeon: Nahed Butler MD;  [facility]     HC UGI ENDOSCOPY W EUS  11/07/2008    Dr. Pastrana, possible chronic pancreatitis, fatty liver     [place] [facility] [place] INTRAUTERINE DEVICE N/A 07/06/2016    Procedure: INSERT INTRAUTERINE DEVICE;  Surgeon: Nahed Butler MD;  [facility]     INT UTERINE FIBRIOD EMBOLIZATION  10/29/2014     LAPAROSCOPIC CHOLECYSTECTOMY  05/28/2008    Dr. Arnol GRUBBS TX, CERVICAL  1992    s/p LEEP, done at [facility] in [place].     ORBITOTOMY Right 03/15/2016    Procedure: ORBITOTOMY;  Surgeon: Myron Cyr MD;  [facility]     ORBITOTOMY Right 08/04/2017    Procedure: ORBITOTOMY;  RIGHT ORBITOTOMY AND BIOPSY;  Surgeon: Charis Holbrook MD;  [facility]     REMOVE INTRAUTERINE DEVICE N/A 4/28/2023    Procedure: Remove intrauterine device,;  Surgeon: Nahed Butler MD;  [facility]     REPAIR PTOSIS Right 11/17/2017    Procedure: REPAIR PTOSIS;  RIGHT UPPER LID PTOSIS REPAIR;  Surgeon: Myron Cyr  MD Selvin;  Location: [facility]     UPPER GI ENDOSCOPY  10/21/2008    mild gastritis, Dr. Rocky [facility] ECHO HEART XTHORACIC,COMPLETE, W/O DOPPLER  2004    [facility] WNL, EF 60%     Family History   Problem Relation Age of Onset     Hypertension Mother      Arthritis Mother      Heart Disease Mother         long qt syndrome     Thyroid Disease Mother      C.A.D. Mother      Heart Disease Father 50        heart attack     Cerebrovascular Disease Father      Diabetes Father      Hypertension Father      Depression Father      C.A.D. Father      Neurologic Disorder Sister         migraines     Neurologic Disorder Sister         migraines     Heart Disease Brother 15        MI at 15, 16.      Arthritis Brother         he passed away, had severe arthritis at age 11     C.A.D. Brother      Anesthesia Reaction Son         PONV     Respiratory Son         asthma     Hypertension Maternal Aunt      Diabetes Maternal Uncle      Hypertension Maternal Uncle      Arthritis Maternal Uncle      Eye Disorder Maternal Uncle         cataracts     Cerebrovascular Disease Maternal Uncle      Family History Negative Other         neg for RA, SLE     Unknown/Adopted No family hx of         unknown neurological issues in her family, mother was adopted     Skin Cancer No family hx of         No known family hx of skin cancer     Deep Vein Thrombosis (DVT) No family hx of      Social History     Socioeconomic History     Marital status: Single     Spouse name: Not on file     Number of children: 1     Years of education: Not on file     Highest education level: Not on file   Occupational History     Employer: NONE    Tobacco Use     Smoking status: Former     Packs/day: 0.20     Years: 1.00     Additional pack years: 0.00     Total pack years: 0.20     Types: Cigarettes     Quit date: 2016     Years since quittin.0     Smokeless tobacco: Never   Vaping Use     Vaping Use: Every day     Substances: Nicotine    Substance and Sexual Activity     Alcohol use: No     Alcohol/week: 0.0 standard drinks of alcohol     Drug use: No     Sexual activity: Not Currently   Other Topics Concern     Parent/sibling w/ CABG, MI or angioplasty before 65F 55M? Yes   Social History Narrative    Balanced Diet - sometimes    Osteoporosis Prevention Measures - Dairy servings per day: 2 servings weekly    Regular Exercise -  Yes Describe walking 4 times a week    Dental Exam - NO    Seatbelts used - Yes    Self Breast Exam - Yes    Abuse: Current or Past (Physical, Sexual or Emotional)- No    Do you have any concerns about STD's -  No    Do you feel safe in your environment - No    Guns stored in the home - No    Sunscreen used - Yes    Lipids -  YES - Date: 053102    Glucose -  YES - Date: 012804    Eye Exam - YES - Date: one year ago    Colon Cancer Screening - No    Hemoccults - NO    Pap Test -  YES - Date: 070904, remote history of LEEP    Mammography - YES - Date: last spring, would like to discuss, needs a referral to [facility]    DEXA - NO    Immunizations reviewed and up to date - Yes, last td given in 1997 or 1998     Social Determinants of Health     Financial Resource Strain: Not on file   Food Insecurity: Not on file   Transportation Needs: Not on file   Physical Activity: Not on file   Stress: Not on file   Social Connections: Not on file   Interpersonal Safety: Not on file   Housing Stability: Not on file     Patient Active Problem List   Diagnosis     Seasonal affective disorder (H24)     Allergic rhinitis     PCOS (polycystic ovarian syndrome)     Moderate persistent asthma     Chronic pancreatitis (H)     Hypertension goal BP (blood pressure) < 140/90     Common migraine     NSTEMI (non-ST elevated myocardial infarction) (H)     Hyperlipidemia LDL goal <70     ASCVD (arteriosclerotic cardiovascular disease)     GERD (gastroesophageal reflux disease)     Ischemic cardiomyopathy     Hypertensive heart disease      Uterine leiomyoma     Iron deficiency anemia     Costochondritis     Vitamin D deficiency     Breast cancer screening     Spinal stenosis in cervical region     Fibromyalgia     Seronegative rheumatoid arthritis (H)     Type 2 diabetes, HbA1C goal < 8% (H)     Type 2 diabetes mellitus with other specified complication (H)     Hyperlipidemia associated with type 2 diabetes mellitus (H)     Hypertension associated with diabetes (H)     Overweight with body mass index (BMI) 25.0-29.9     Tobacco use disorder     Telogen effluvium     CAD S/P percutaneous coronary angioplasty     Status post coronary angiogram     ANCA-associated vasculitis (H)     Wegener's vasculitis     Type 1 diabetes mellitus with complications (H)     Chest discomfort     Urinary frequency     Abdominal pain, epigastric     Hypokalemia     Leukocytosis, unspecified type     Acute pancreatitis, unspecified complication status, unspecified pancreatitis type       Pregnancy Hx: She is . All miscarriages were in first trimester. H/o OC use in the past. 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    PMHx, FHx, SHx were reviewed, unchanged.    Outpatient Encounter Medications as of 2024   Medication Sig Dispense Refill     acetaminophen (TYLENOL) 325 MG tablet Take 1-2 tablets (325-650 mg) by mouth every 6 hours as needed for pain (headache) 250 tablet 4     ADVAIR DISKUS 250-50 MCG/ACT inhaler Inhale 1 puff into the lungs 2 times daily       albuterol (2.5 MG/3ML) 0.083% neb solution INL 1 VIAL VIA NEBULIZATION Q 4 TO 6 HOURS PRN  1     albuterol (PROAIR HFA, PROVENTIL HFA, VENTOLIN HFA) 108 (90 BASE) MCG/ACT inhaler Inhale 2 puffs into the lungs every 6 hours as needed for shortness of breath / dyspnea or wheezing 3 Inhaler 1     BANOPHEN 25 MG tablet Take 25 mg by mouth At Bedtime       blood glucose (NO BRAND SPECIFIED) lancets standard Use to test blood sugar 1-4 times daily or as directed. 400 each 3     blood glucose  (NO BRAND SPECIFIED) test strip Use to test blood sugar fasting each am and predinner daily. 250 strip 3     blood glucose monitoring (NO BRAND SPECIFIED) meter device kit Use to test blood sugar 2 times daily or as directed. 1 kit 0     Blood Pressure Monitor KIT 1 each daily Monitor home blood pressure as instructed by physician.  Dispense [place] blood pressure kit. 1 kit 0     calcium carbonate (TUMS) 500 MG chewable tablet Take 3-4 tablets (1,500-2,000 mg) by mouth daily as needed 90 tablet 3     clopidogrel (PLAVIX) 75 MG tablet Take 1 tablet (75 mg) by mouth daily . 90 tablet 3     clotrimazole (MYCELEX) 10 MG lozenge Place 1 lozenge (10 mg) inside cheek 5 times daily 50 lozenge 1     cyclobenzaprine (FLEXERIL) 10 MG tablet Take 1 tablet (10 mg) by mouth 2 times daily as needed for muscle spasms 60 tablet 3     dicyclomine (BENTYL) 20 MG tablet TAKE 1 TABLET(20 MG) BY MOUTH FOUR TIMES DAILY AS NEEDED. 60 tablet 4     empagliflozin (JARDIANCE) 10 MG TABS tablet Take 1 tablet (10 mg) by mouth daily 30 tablet 3     EPIPEN 2-RIKY 0.3 MG/0.3ML injection INJECT 0.3 MG INTO THE MUSCLE PRF ANAPHYALAXIS  0     esomeprazole (NEXIUM) 40 MG DR capsule Take 1 capsule (40 mg) by mouth 2 times daily Take 30-60 minutes before eating. 180 capsule 0     estradiol (VAGIFEM) 10 MCG TABS vaginal tablet Place 1 tablet (10 mcg) vaginally twice a week 24 tablet 3     fluticasone (FLONASE) 50 MCG/ACT nasal spray Spray 1 spray in nostril as needed       folic acid (FOLVITE) 1 MG tablet Take 1 tablet (1 mg) by mouth daily 90 tablet 0     hydrocortisone (CORTAID) 1 % external cream Apply topically 2 times daily (Patient taking differently: Apply topically as needed) 60 g 1     insulin glargine (LANTUS PEN) 100 UNIT/ML pen Inject 55 Units Subcutaneous every morning Or as directed. 75 mL 3     insulin pen needle (BD PEN NEEDLE MARCK 2ND GEN) 32G X 4 MM miscellaneous Use one pen needle daily or as directed. 100 each 3     ketoconazole  (NIZORAL) 2 % shampoo Apply topically daily as needed       levocetirizine (XYZAL) 5 MG tablet Take 5 mg by mouth as needed       lisinopril-hydrochlorothiazide (ZESTORETIC) 20-25 MG tablet Take 1 tablet by mouth daily 90 tablet 3     LORazepam (ATIVAN) 0.5 MG tablet Take 1 tablet 30-60 minutes before your scheduled MRI 1 tablet 0     magnesium 250 MG tablet TAKE 1 TABLET(250 MG) BY MOUTH TWICE DAILY 60 tablet 3     metFORMIN (GLUCOPHAGE XR) 500 MG 24 hr tablet Take 4 tablets (2,000 mg) by mouth daily (with dinner) 360 tablet 3     methylPREDNISolone (MEDROL DOSEPAK) 4 MG tablet therapy pack Follow Package Directions 21 tablet 0     metoprolol succinate ER (TOPROL XL) 100 MG 24 hr tablet Take 1 tablet (100 mg) by mouth daily 90 tablet 3     Multiple Vitamin (TAB-A-GERALD) TABS Take 1 tablet by mouth daily 90 tablet 1     nitroGLYcerin (NITROSTAT) 0.4 MG sublingual tablet For chest pain place 1 tablet under the tongue every 5 minutes for 3 doses. If symptoms persist 5 minutes after 1st dose call 911. 30 tablet 11     nitroGLYcerin (NITROSTAT) 0.4 MG sublingual tablet Place 1 tablet (0.4 mg) under the tongue every 5 minutes as needed for chest pain . After 3 doses, if pain persists call 911. 25 tablet 3     olopatadine (PATANOL) 0.1 % ophthalmic solution Place 1 drop into both eyes as needed       pravastatin (PRAVACHOL) 40 MG tablet Take 1 tablet (40 mg) by mouth daily 90 tablet 3     prochlorperazine (COMPAZINE) 5 MG tablet Take 1 tablet (5 mg) by mouth every 6 hours as needed for nausea or vomiting 60 tablet 3     sennosides (SENOKOT) 8.6 MG tablet 1-2 tabs a day as needed for constipation 60 tablet 1     spironolactone (ALDACTONE) 25 MG tablet Take 1 tablet (25 mg) by mouth daily. May also take 0.5 tablets (12.5 mg) daily as needed (for weight gain). 45 tablet 9     sucralfate (CARAFATE) 1 GM tablet Take 1 tablet (1 g) by mouth 4 times daily 120 tablet 3     terbinafine (LAMISIL) 1 % external cream Apply topically 2  "times daily (Patient taking differently: Apply topically as needed) 42 g 1     tiZANidine (ZANAFLEX) 4 MG tablet Take 1 tablet (4 mg) by mouth 3 times daily as needed for muscle spasms 21 tablet 3     traMADol (ULTRAM) 50 MG tablet TAKE 1 TABLET(50 MG) BY MOUTH EVERY 8 HOURS AS NEEDED FOR SEVERE PAIN 60 tablet 3     vitamin D2 (ERGOCALCIFEROL) 50228 units (1250 mcg) capsule Take 1 capsule (50,000 Units) by mouth every 7 days 4 capsule 0     vitamin D3 (CHOLECALCIFEROL) 50 mcg (2000 units) tablet Take 1 tablet (50 mcg) by mouth daily 90 tablet 0     No facility-administered encounter medications on file as of 1/31/2024.       Allergies   Allergen Reactions     Amoxicillin-Pot Clavulanate      Unknown possible hives and edema     Amoxicillin-Pot Clavulanate      Artificial Sweetner (Informational Only)  Other (See Comments)     Increased headache     Azithromycin      Clavulanic Acid      Diatrizoate Other (See Comments)     Pt wants this listed because she is allergic to shellfish      Imitrex [Triptans]      Severe face/neck/chest tightness and flushing side effects      Penicillins Hives     Unknown      Pork Allergy      Stomach pain, cramping, diarrhea, itching, nausea and headaches     Shellfish Allergy Hives and Swelling     Shellfish-Derived Products      Sulfa Antibiotics Hives and Swelling     Sulfatolamide      Zithromax [Azithromycin Dihydrate] Swelling     Unknown        Ph.E:    /78 (BP Location: Left arm, Patient Position: Sitting, Cuff Size: Adult Regular)   Pulse 54   Ht 1.575 m (5' 2\")   Wt 73.5 kg (162 lb)   SpO2 97%   BMI 29.63 kg/m      GA: NAD, pleasant  HEENT: nl conj, sclera, EOMI. Mild campos-orbital edema under R eye  Chest: CTAB  CV: no M/R/G, RRR  Abdomen: soft, NT  MSK: + active synovitis and joint tenderness over B/L 2nd/3rd MCPs  Skin: no rash  Neuro: non focal  Psych: nl affect    Assessment/Plan:    1-Seropositive non-erosive RA (arthritis, AM stiffness, high CRP, RF 26 but " re-check neg 3/2015 on HCQ) Dx 5/2013, FMS, new pleuritic CP 3/20-15-5/2015 resolved on steroids. She is on  mg bid since 5/2014. She tolerates it well and it's helping some but not enough to control all her pain. Continued having flare ups of joint pain, could be GPA related. Some pain is due to FMS.My impression is that her arthralgias are a combination of RA/ IA sec GPA, fibromyalgia and chronic pain.     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 Prednisone was not a good option for her given weight gain, diabetes. She tried and did not tolerate MTX, AZA.    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 Repeat MPO was positive in 6/2017.    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 Shah and Dr. Valdez at [place]. 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     She is on rituximab since 12/12/2018 with great response, minor allergic reaction which could be managed by pre-meds and extra steroid dose. Developed high BG and vaginal yeast infections after solumedrol premed. Treated candida with fluconazole. Will decrease solumerdol dose to IV 80 mg prior to receiving rituximab. 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 However, feels Rituximab effects wear off around 4 months. According to ACR guidelines can give every 4-6 months. Pt elected to received this upcoming September which will be approximately 5 months from last dose. Repeat Orbit CT in September 2021 demonstrated improvement.     Last visit in April 2022-- Recommended marie given b cell depletion tx as rituximab blocks the response to covid vaccine, she is concerned about side effects. I advised her to discuss with [facility] pharmacist.      1/25/2023: Janine has been ill since Dx of COVID on 12/17/2022. Her most recent labs were  reviewed, stable vasculitis labs in 8/2022 with CD19<1, neg vasculitis markers. In 1/2023, no anemia and nl thyroid function test. I ordered vit D as add on. 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 She does need to follow up on abnormal thyroid US and mammogram as well. 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 I will see her in person, in early march to determine if it is ok to give another dose of rituxiamb. 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 Also ordered shower chair, commode per her request given significant fatigue, body pain.    3/9/2023: S/p last rituximab 1 gram on 9/7/2022. 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 Stable labs and eye inflammation.    2-Fat pads. She was seen by endocrinology and cushing was ruled out in 4/2014. Was advised to do f/u for enlarged thyroid/thyroid nodules.  3-Hair loss. Continue with dermatology  4-Chronic pain. F/U with pain clinic  5-Vit D deficiency. Was replaced with vit D 50, 000 units po qwk x 12 wk then 2000 units every day  6-?HCQ toxicity on OCT 7/2021, now off HCQ, could explain increased arthralgia  7- Chronic Headaches. Symptoms worsen after taking zofran. Has received compazine in hospital in the past without adverse effect. Will have patient trial Compazine       3/2023 plan:    Spine referral for abnormal C spine (DJD) in 2/2023.    Rituximab 1 gram one dose only this month    Labs in May 2023    Follow up eye exam 8/2023    Return in person in about 5-6  months       9/6/2023: last rituximab 1 gram one dose for ANCA vasculitis on 3/28/2023, increased arthralgia, will try rituximab at full dose, zanaflex and consider resuming HCQ as last eye exam did rule out HCQ toxicity (HCQ was stopped with concern for possible HCQ toxicity).    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 Will schedule rituximab in Feb, pain mx was discussed. Labs are stable.    S/p rituximab 1 gram on 9/19/2023, 10/3/2023.    Plan:      Rituximab 1 gram every 2 weeks x 2 infusion in Feb for ANCA vasculitis    Labs with next rituximab infusion    X-rays today    Acupuncture referral    Return in person in about 4 months    Orders Placed This Encounter   Procedures     X-ray bl Foot 3+ views     X-ray bl Knee standing 1-2 vw     XR Hand Bilateral 2 Views     AST     ALT     Myeloperoxidase and Proteinase 3 Panel     Albumin level     Creatinine     CRP inflammation     UA with Microscopic reflex to Culture     Protein  random urine     Creatinine random urine     Erythrocyte sedimentation rate auto     CD19 B Cell Count     ANCA IgG by IFA with Reflex to Titer     Immunoglobulins A G and M     Cyclic Citrullinated Peptide Antibody IgG     Rheumatoid factor     Acupuncture Referral     CBC with Platelets & Differential       Majo Mckinnon MD

## 2024-01-31 NOTE — TELEPHONE ENCOUNTER
-- DO NOT REPLY / DO NOT REPLY ALL --  -- Message is from Engagement Center Operations (ECO) --    General Patient Message patient would like form for tb test filled out please call back      Caller Information       Type Contact Phone/Fax    12/14/2022 09:40 AM CST Phone (Incoming) Rachel Landaverde (Self) [phone] (M)        Alternative phone number: no    Can a detailed message be left? Yes    Message Turnaround:     Is it Working Hours? Yes - Working Hours     [place]:    Please give this turnaround time to the caller:   \"This message will be sent to [state Provider's name]. The clinical team will fulfill your request as soon as they review your message.\"                 Updated therapy plan orders entered per direction of provider-      Rituximab 1 gram every 2 weeks x 2 infusion in Feb for ANCA vasculitis          Krissy Taylor, ISHAN, RN  RN Care Coordinator Rheumatology

## 2024-01-31 NOTE — PATIENT INSTRUCTIONS
Rituximab 1 gram every 2 weeks x 2 infusion in Feb for ANCA vasculitis    Labs with next rituximab infusion    X-rays today    Acupuncture referral    Return in person in about 4 months

## 2024-02-01 RX ORDER — HEPARIN SODIUM (PORCINE) LOCK FLUSH IV SOLN 100 UNIT/ML 100 UNIT/ML
5 SOLUTION INTRAVENOUS
Status: CANCELLED | OUTPATIENT
Start: 2024-02-03

## 2024-02-01 RX ORDER — HEPARIN SODIUM,PORCINE 10 UNIT/ML
5-20 VIAL (ML) INTRAVENOUS DAILY PRN
Status: CANCELLED | OUTPATIENT
Start: 2024-02-03

## 2024-02-01 RX ORDER — METHYLPREDNISOLONE SODIUM SUCCINATE 125 MG/2ML
81.25 INJECTION, POWDER, LYOPHILIZED, FOR SOLUTION INTRAMUSCULAR; INTRAVENOUS ONCE
Status: CANCELLED | OUTPATIENT
Start: 2024-02-03

## 2024-02-01 RX ORDER — EPINEPHRINE 1 MG/ML
0.3 INJECTION, SOLUTION, CONCENTRATE INTRAVENOUS EVERY 5 MIN PRN
Status: CANCELLED | OUTPATIENT
Start: 2024-02-03

## 2024-02-01 RX ORDER — ALBUTEROL SULFATE 90 UG/1
1-2 AEROSOL, METERED RESPIRATORY (INHALATION)
Status: CANCELLED
Start: 2024-02-03

## 2024-02-01 RX ORDER — DIPHENHYDRAMINE HYDROCHLORIDE 50 MG/ML
50 INJECTION INTRAMUSCULAR; INTRAVENOUS
Status: CANCELLED
Start: 2024-02-03

## 2024-02-01 RX ORDER — MEPERIDINE HYDROCHLORIDE 25 MG/ML
25 INJECTION INTRAMUSCULAR; INTRAVENOUS; SUBCUTANEOUS EVERY 30 MIN PRN
Status: CANCELLED | OUTPATIENT
Start: 2024-02-03

## 2024-02-01 RX ORDER — ACETAMINOPHEN 325 MG/1
650 TABLET ORAL ONCE
Status: CANCELLED
Start: 2024-02-03

## 2024-02-01 RX ORDER — ALBUTEROL SULFATE 0.83 MG/ML
2.5 SOLUTION RESPIRATORY (INHALATION)
Status: CANCELLED | OUTPATIENT
Start: 2024-02-03

## 2024-02-01 RX ORDER — METHYLPREDNISOLONE SODIUM SUCCINATE 125 MG/2ML
125 INJECTION, POWDER, LYOPHILIZED, FOR SOLUTION INTRAMUSCULAR; INTRAVENOUS
Status: CANCELLED
Start: 2024-02-03

## 2024-02-02 ENCOUNTER — TELEPHONE (OUTPATIENT)
Dept: RHEUMATOLOGY | Facility: CLINIC | Age: 59
End: 2024-02-02
Payer: COMMERCIAL

## 2024-02-02 DIAGNOSIS — E55.9 VITAMIN D DEFICIENCY: ICD-10-CM

## 2024-02-02 NOTE — TELEPHONE ENCOUNTER
Results letters for both imaging exams mailed to patient per direction of provider.    ISHA PhillipsN, RN  RN Care Coordinator Rheumatology

## 2024-02-02 NOTE — TELEPHONE ENCOUNTER
Message left for patient that Dr. Mckinnon has signed her new therapy plan for Rituximab.    Discussed the new process in 2024 with needing to schedule infusion first- and then securing approval.  Encouraged patient to contact her insurance with any questions regarding out of pocket expenses.    Will send a message to the scheduling team to reach out to the patient.\    Encouraged patient to call this RN with questions.    ISHA PhillipsN, RN  RN Care Coordinator Rheumatology

## 2024-02-06 ENCOUNTER — TELEPHONE (OUTPATIENT)
Dept: GASTROENTEROLOGY | Facility: CLINIC | Age: 59
End: 2024-02-06
Payer: COMMERCIAL

## 2024-02-06 NOTE — TELEPHONE ENCOUNTER
vitamin D2 (ERGOCALCIFEROL) 61608 units (1250 mcg) capsule 4 capsule 0 12/7/2023     Last Office Visit: 1/31/24  Future Office visit: 6/5/24      Routing refill request to provider for review/approval because:  NOT ON REFILL PROTOCOL    MESSAGE FROM PHARMACY:  PATIENT WAS EXPECTING LONGER DOSE OF 50K/WK REPLETION BEFORE LOW DOSE VITAMIN D.     Lucia Wong RN  [facility] Red Flag Triage/MRT

## 2024-02-06 NOTE — TELEPHONE ENCOUNTER
LPN left message stating that imaging had attempted to contact her on 2/5/24 to confirm an imaging appointment for 2/7/24 at 8 am with a 7 am arrival. LPN stated in message that she would need a  due to taking ativan. LPN requested that she call imaging scheduling to confirm this appointment.    Pretty Acevedo LPN

## 2024-02-07 RX ORDER — ERGOCALCIFEROL 1.25 MG/1
50000 CAPSULE, LIQUID FILLED ORAL
Qty: 4 CAPSULE | OUTPATIENT
Start: 2024-02-07

## 2024-02-07 RX ORDER — CHOLECALCIFEROL (VITAMIN D3) 50 MCG
1 TABLET ORAL DAILY
Qty: 90 TABLET | Refills: 1 | Status: SHIPPED | OUTPATIENT
Start: 2024-02-07 | End: 2024-06-05

## 2024-02-07 NOTE — TELEPHONE ENCOUNTER
Majo Mckinnon MD   to Me   PF    2/6/24  5:15 PM  Plz refill as vit D 2000 units every day till we get the result of vit D level    Received above note from provider. Refill denied and discontinued for 50,000 tablet.   New Rx sent in for 2000 unit tablet.     Lucia Wong RN  P Red Flag Triage/MRT

## 2024-02-09 ENCOUNTER — OFFICE VISIT (OUTPATIENT)
Dept: FAMILY MEDICINE | Facility: CLINIC | Age: 59
End: 2024-02-09
Payer: COMMERCIAL

## 2024-02-09 VITALS
OXYGEN SATURATION: 97 % | WEIGHT: 169 LBS | BODY MASS INDEX: 31.1 KG/M2 | SYSTOLIC BLOOD PRESSURE: 126 MMHG | DIASTOLIC BLOOD PRESSURE: 77 MMHG | HEIGHT: 62 IN

## 2024-02-09 DIAGNOSIS — R11.0 NAUSEA: ICD-10-CM

## 2024-02-09 DIAGNOSIS — M79.2 NEURALGIA: Primary | ICD-10-CM

## 2024-02-09 PROCEDURE — 99214 OFFICE O/P EST MOD 30 MIN: CPT | Performed by: FAMILY MEDICINE

## 2024-02-09 RX ORDER — PREGABALIN 25 MG/1
CAPSULE ORAL
Qty: 30 CAPSULE | Refills: 1 | Status: SHIPPED | OUTPATIENT
Start: 2024-02-09 | End: 2024-05-30

## 2024-02-09 RX ORDER — PROCHLORPERAZINE MALEATE 5 MG
5 TABLET ORAL EVERY 6 HOURS PRN
Qty: 60 TABLET | Refills: 3 | Status: SHIPPED | OUTPATIENT
Start: 2024-02-09 | End: 2024-06-05

## 2024-02-09 RX ORDER — PROCHLORPERAZINE MALEATE 5 MG
5 TABLET ORAL EVERY 6 HOURS PRN
Qty: 60 TABLET | Refills: 3 | Status: CANCELLED | OUTPATIENT
Start: 2024-02-09

## 2024-02-09 NOTE — PROGRESS NOTES
"Janine is a 58 year old that presents in clinic today for the following:     Chief Complaint   Patient presents with    Follow Up           2/9/2024     2:15 PM   Additional Questions   Roomed by Renny Noriega     Screenings from encounters over the past 10 days    No data recorded       Renny Noriega at 2:25 PM on 2/9/2024    Assessment & Plan     Nausea    - prochlorperazine (COMPAZINE) 5 MG tablet; Take 1 tablet (5 mg) by mouth every 6 hours as needed for nausea or vomiting    She declines further shots today    Neuralgia    - pregabalin (LYRICA) 25 MG capsule; Take one po qd      35 minutes spent by me on the date of the encounter doing chart review, history and exam, documentation and further activities per the note she will see me back after MR, mab dosing. Having what sounds like raynauds, discussed that too      BMI  Estimated body mass index is 30.9 kg/m  as calculated from the following:    Height as of this encounter: 1.575 m (5' 2.01\").    Weight as of this encounter: 76.7 kg (169 lb).             Return in about 1 month (around 3/9/2024).    Subjective   Janine is a 58 year old, presenting for the following health issues:  Follow Up      2/9/2024     2:15 PM   Additional Questions   Roomed by Renny Noriega     HPI Here in follow-up  Not having any new sx  Still abd pain, nausea  Compazine helps somewhat as does zofran, both tolerated. Discussed med cannabis, and the pgm, she'll consider  MR pancreas coming up, and two mab doses from Rheum  Interval studies and tests rvwd  Notes in cold feet discolored    Has never tried Lyrica, discussed how it works, possible side effects at length, she is open to try low dose to see if tolerates, slow up if works/toelrated  Past Medical History:   Diagnosis Date    Abnormal glandular Papanicolaou smear of cervix 1992    Allergic rhinitis     Allergy, airborne subst    Arthritis     ASCVD (arteriosclerotic cardiovascular disease)     Chronic pain     Chronic " pancreatitis (H)     idiopathic, Tx: PPI, antioxidants, pancreatic enzymes    Common migraine     Coronary artery disease     Costochondritis     Difficulty in walking(719.7)     Dyspnea on exertion     Ectasia, mammary duct     [facility], persistent nipple discharge    Elevated fasting glucose     Gastro-oesophageal reflux disease     Granulomatosis with polyangiitis (H)     Hernia     History of angina     Hyperlipidemia LDL goal < 100     Hypertension goal BP (blood pressure) < 140/90     Essential hypertension    Iron deficiency anemia     Ischemic cardiomyopathy     Menorrhagia     Migraine headaches     Mild persistent asthma     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 Matias, neg w/u, Rx'd w/prednisone    Ventral hernia, unspecified, without mention of obstruction or gangrene 2012     Past Surgical History:   Procedure Laterality Date    C/SECTION, LOW TRANSVERSE  1996        CARDIAC SURGERY      cardiac stent- recent in 16; 4 other stents    COLONOSCOPY N/A 2023    Procedure: COLONOSCOPY, WITH POLYPECTOMY;  Surgeon: Percy Gross MD;  Location: [facility] OR    DILATION AND CURETTAGE N/A 2016    Procedure: DILATION AND CURETTAGE;  Surgeon: Nahed Butler MD;  [facility]    ENDOMETRIAL SAMPLING (BIOPSY)  N/A 4/28/2023    Procedure: ENDOMETRIAL BIOPSY;  Surgeon: Nahed Butler MD;  [facility]    ESOPHAGOSCOPY, GASTROSCOPY, DUODENOSCOPY (EGD), COMBINED N/A 03/31/2022    Procedure: ESOPHAGOGASTRODUODENOSCOPY, WITH BIOPSY;  Surgeon: Enzo Sesay MD;  [facility]    ESOPHAGOSCOPY, GASTROSCOPY, DUODENOSCOPY (EGD), COMBINED N/A 5/25/2023    Procedure: ESOPHAGOGASTRODUODENOSCOPY, WITH BIOPSY;  Surgeon: Percy Gross MD;  Location: [facility] OR    EXAM UNDER ANESTHESIA PELVIC N/A 4/28/2023    Procedure: EXAM UNDER ANESTHESIA;  Surgeon: Nahed Butler MD;  [facility]    HC UGI ENDOSCOPY W EUS  11/07/2008    Dr. Pastrana, possible chronic pancreatitis, fatty liver    HERNIA REPAIR  12/01/2012    done at [facility]    INSERT INTRAUTERINE DEVICE N/A 07/06/2016    Procedure: INSERT INTRAUTERINE DEVICE;  Surgeon: Nahed Butler MD;  [facility]    INT UTERINE FIBRIOD EMBOLIZATION  10/29/2014    LAPAROSCOPIC CHOLECYSTECTOMY  05/28/2008    Dr. Arnol GRUBBS TX, CERVICAL  1992    s/p LEEP, done at [facility] in [place].    ORBITOTOMY Right 03/15/2016    Procedure: ORBITOTOMY;  Surgeon: Myron Cyr MD;  Location: [facility]    ORBITOTOMY Right 08/04/2017    Procedure: ORBITOTOMY;  RIGHT ORBITOTOMY AND BIOPSY;  Surgeon: Charis Holbrook MD;  Location: [facility]    REMOVE INTRAUTERINE DEVICE N/A 4/28/2023    Procedure: Remove intrauterine device,;  Surgeon: Nahed Butler MD;  [facility]    REPAIR PTOSIS Right 11/17/2017    Procedure: REPAIR PTOSIS;  RIGHT UPPER LID PTOSIS REPAIR;  Surgeon: Myron Cyr MD;  Location: [facility]    UPPER GI ENDOSCOPY  10/21/2008    mild gastritis, Dr. Rocky [facility] ECHO HEART XTHORACIC,COMPLETE, W/O DOPPLER  02/04/2004    [facility] WNL, EF 60%     Current Outpatient Medications   Medication    acetaminophen (TYLENOL) 325 MG tablet    ADVAIR DISKUS 250-50 MCG/ACT inhaler    albuterol (2.5 MG/3ML) 0.083% neb solution     albuterol (PROAIR HFA, PROVENTIL HFA, VENTOLIN HFA) 108 (90 BASE) MCG/ACT inhaler    BANOPHEN 25 MG tablet    blood glucose (NO BRAND SPECIFIED) lancets standard    blood glucose (NO BRAND SPECIFIED) test strip    blood glucose monitoring (NO BRAND SPECIFIED) meter device kit    Blood Pressure Monitor KIT    calcium carbonate (TUMS) 500 MG chewable tablet    clopidogrel (PLAVIX) 75 MG tablet    clotrimazole (MYCELEX) 10 MG lozenge    cyclobenzaprine (FLEXERIL) 10 MG tablet    dicyclomine (BENTYL) 20 MG tablet    empagliflozin (JARDIANCE) 10 MG TABS tablet    EPIPEN 2-RIKY 0.3 MG/0.3ML injection    esomeprazole (NEXIUM) 40 MG DR capsule    estradiol (VAGIFEM) 10 MCG TABS vaginal tablet    fluticasone (FLONASE) 50 MCG/ACT nasal spray    folic acid (FOLVITE) 1 MG tablet    hydrocortisone (CORTAID) 1 % external cream    insulin glargine (LANTUS PEN) 100 UNIT/ML pen    insulin pen needle (BD PEN NEEDLE MARCK 2ND GEN) 32G X 4 MM miscellaneous    levocetirizine (XYZAL) 5 MG tablet    lisinopril-hydrochlorothiazide (ZESTORETIC) 20-25 MG tablet    magnesium 250 MG tablet    metFORMIN (GLUCOPHAGE XR) 500 MG 24 hr tablet    metoprolol succinate ER (TOPROL XL) 100 MG 24 hr tablet    Multiple Vitamin (TAB-A-GERALD) TABS    nitroGLYcerin (NITROSTAT) 0.4 MG sublingual tablet    olopatadine (PATANOL) 0.1 % ophthalmic solution    pravastatin (PRAVACHOL) 40 MG tablet    pregabalin (LYRICA) 25 MG capsule    prochlorperazine (COMPAZINE) 5 MG tablet    sennosides (SENOKOT) 8.6 MG tablet    spironolactone (ALDACTONE) 25 MG tablet    sucralfate (CARAFATE) 1 GM tablet    terbinafine (LAMISIL) 1 % external cream    traMADol (ULTRAM) 50 MG tablet    vitamin D3 (CHOLECALCIFEROL) 50 mcg (2000 units) tablet    ketoconazole (NIZORAL) 2 % shampoo    LORazepam (ATIVAN) 0.5 MG tablet    methylPREDNISolone (MEDROL DOSEPAK) 4 MG tablet therapy pack    nitroGLYcerin (NITROSTAT) 0.4 MG sublingual tablet    tiZANidine (ZANAFLEX) 4 MG tablet    vitamin D3  "(CHOLECALCIFEROL) 50 mcg (2000 units) tablet     No current facility-administered medications for this visit.     Allergies   Allergen Reactions    Amoxicillin-Pot Clavulanate      Unknown possible hives and edema    Amoxicillin-Pot Clavulanate     Artificial Sweetner (Informational Only)  Other (See Comments)     Increased headache    Azithromycin     Clavulanic Acid     Diatrizoate Other (See Comments)     Pt wants this listed because she is allergic to shellfish     Imitrex [Triptans]      Severe face/neck/chest tightness and flushing side effects     Penicillins Hives     Unknown     Pork Allergy      Stomach pain, cramping, diarrhea, itching, nausea and headaches    Shellfish Allergy Hives and Swelling    Shellfish-Derived Products     Sulfa Antibiotics Hives and Swelling    Sulfatolamide     Zithromax [Azithromycin Dihydrate] Swelling     Unknown            Objective    /77 (BP Location: Right arm, Patient Position: Sitting, Cuff Size: Adult Large)   Ht 1.575 m (5' 2.01\")   Wt 76.7 kg (169 lb)   SpO2 97%   BMI 30.90 kg/m    Body mass index is 30.9 kg/m .  Physical Exam   GENERAL: alert and no distress  ABDOMEN: soft, nontender, no hepatosplenomegaly, no masses and bowel sounds normal  MS: no gross musculoskeletal defects noted, no edema; today both feet normal color temp no wounds            Signed Electronically by: Kash Solano MD        "

## 2024-02-09 NOTE — COMMUNITY RESOURCES LIST (ENGLISH)
02/09/2024   [facility]  N/A  For questions about this resource list or additional care needs, please contact your primary care clinic or care manager.  Phone: [phone]   Email: N/A   Address: [address] [place] [address]   Hours: N/A        Food and Nutrition       Food pantry  1  [facility] - Food Shelf Distance: 1.09 miles      [facility]   [address], [place] [address]  Language: English, Singaporean  Hours: Mon 12:00 PM - 6:00 PM , Tue 12:00 PM - 3:00 PM , Wed 12:00 PM - 4:00 PM , Fri 12:00 PM - 4:00 PM  Fees: Free   Phone: (150) 124-9815 Website: https://www.[facility]sa[facility]un[facility] [employer] ([place]/rosa maria     2  [facility] Food [place] - Food Shelf Distance: 1.11 miles      [facility]   [address] [place] [address]  Language: English, French  Hours: Wed 10:00 AM - 1:00 PM  Fees: Free   Phone: (870) 831-8703 Website: http://www.[employer].org/     SNAP application assistance  3  [facility] Distance: 1.09 miles      Phone/Virtual   [address] [place] [address]  Language: English, Singaporean  Hours: Mon - Fri 9:00 AM - 8:00 PM  Fees: Free   Phone: (869) 406-1342 Website: https://www.[facility]sa[facility]un[facility] [employer] ([place]/rosa maria     4  [facility] - Chemical Dependency Services (CDS) Distance: 1.6 miles      In-Person, Phone/Virtual   [address] [place] [address]  Language: English  Hours: Mon - Fri 9:00 AM - 9:00 PM  Fees: Free   Phone: (719) 214-6212 Website: https://www.[facility]/human-services/treatment-substance-use-disorders     Soup kitchen or free meals  5  [facility] - Food [place] Public Meals Distance: 1.14 miles      [facility]   [address] [place] [address]  Language: English  Hours: Mon - Sun 8:30 AM - 9:00 AM , Mon - Sun 12:00 PM - 1:00 PM  Fees: Free   Phone: (929) 826-4512 Email: info@[facility]  Website: https://[employer]ien[employer]estemn.org/     6  [facility] Distance: 1.48 miles      In-Person   [address] [place] [address]  Language: English, Citizen of [place]  Hours: Mon - Sat 8:00 AM - 9:00 AM , Mon - Sat 11:30 AM - 12:30 PM  Fees: Free   Phone: (778) 201-2205 Email: info@[employer] Website: https://www.[employer]/locations/opportunity-[place]/          Important Numbers & Websites       Emergency Services   911  [facility] Services   311  Poison Control   (308) 756-7803  Suicide Prevention Lifeline   (429) 857-1782 (TALK)  Child Abuse Hotline   (149) 428-1538 (4-A-Child)  Sexual Assault Hotline   (422) 999-7422 (HOPE)  National Runaway Safeline   (923) 120-8342 (RUNAWAY)  All-Options Talkline   (848) 538-6471  Substance Abuse Referral   (613) 518-4759 (HELP)

## 2024-02-12 ENCOUNTER — INFUSION THERAPY VISIT (OUTPATIENT)
Dept: INFUSION THERAPY | Facility: CLINIC | Age: 59
End: 2024-02-12
Attending: INTERNAL MEDICINE
Payer: COMMERCIAL

## 2024-02-12 VITALS
HEART RATE: 74 BPM | OXYGEN SATURATION: 98 % | BODY MASS INDEX: 30.81 KG/M2 | RESPIRATION RATE: 16 BRPM | DIASTOLIC BLOOD PRESSURE: 77 MMHG | TEMPERATURE: 98.5 F | WEIGHT: 168.5 LBS | SYSTOLIC BLOOD PRESSURE: 118 MMHG

## 2024-02-12 DIAGNOSIS — M25.562 PAIN IN BOTH KNEES, UNSPECIFIED CHRONICITY: ICD-10-CM

## 2024-02-12 DIAGNOSIS — M31.30 GRANULOMATOSIS WITH POLYANGIITIS WITHOUT RENAL INVOLVEMENT (H): Primary | ICD-10-CM

## 2024-02-12 DIAGNOSIS — M19.90 INFLAMMATORY ARTHRITIS: ICD-10-CM

## 2024-02-12 DIAGNOSIS — I77.82 ANCA-ASSOCIATED VASCULITIS (H): ICD-10-CM

## 2024-02-12 DIAGNOSIS — M25.561 PAIN IN BOTH KNEES, UNSPECIFIED CHRONICITY: ICD-10-CM

## 2024-02-12 DIAGNOSIS — G89.29 OTHER CHRONIC PAIN: ICD-10-CM

## 2024-02-12 DIAGNOSIS — Z51.81 ENCOUNTER FOR MEDICATION MONITORING: ICD-10-CM

## 2024-02-12 DIAGNOSIS — K86.1 CHRONIC PANCREATITIS, UNSPECIFIED PANCREATITIS TYPE (H): ICD-10-CM

## 2024-02-12 LAB
ALBUMIN MFR UR ELPH: <6 MG/DL
ALBUMIN SERPL BCG-MCNC: 4.4 G/DL (ref 3.5–5.2)
ALBUMIN UR-MCNC: NEGATIVE MG/DL
ALT SERPL W P-5'-P-CCNC: 27 U/L (ref 0–50)
APPEARANCE UR: CLEAR
AST SERPL W P-5'-P-CCNC: 30 U/L (ref 0–45)
BASOPHILS # BLD AUTO: 0.1 10E3/UL (ref 0–0.2)
BASOPHILS NFR BLD AUTO: 0 %
BILIRUB UR QL STRIP: NEGATIVE
CD19 B CELL COMMENT: ABNORMAL
CD19 CELLS # BLD: <1 CELLS/UL (ref 107–698)
CD19 CELLS NFR BLD: <1 % (ref 6–27)
COLOR UR AUTO: ABNORMAL
CREAT SERPL-MCNC: 1.07 MG/DL (ref 0.51–0.95)
CREAT UR-MCNC: 43.3 MG/DL
CRP SERPL-MCNC: 4.76 MG/L
EGFRCR SERPLBLD CKD-EPI 2021: 60 ML/MIN/1.73M2
EOSINOPHIL # BLD AUTO: 0.5 10E3/UL (ref 0–0.7)
EOSINOPHIL NFR BLD AUTO: 4 %
ERYTHROCYTE [DISTWIDTH] IN BLOOD BY AUTOMATED COUNT: 15.2 % (ref 10–15)
ERYTHROCYTE [SEDIMENTATION RATE] IN BLOOD BY WESTERGREN METHOD: 17 MM/HR (ref 0–30)
GLUCOSE UR STRIP-MCNC: >=1000 MG/DL
HCT VFR BLD AUTO: 38.5 % (ref 35–47)
HGB BLD-MCNC: 12.6 G/DL (ref 11.7–15.7)
HGB UR QL STRIP: NEGATIVE
IGA SERPL-MCNC: 147 MG/DL (ref 84–499)
IGG SERPL-MCNC: 512 MG/DL (ref 610–1616)
IGM SERPL-MCNC: 20 MG/DL (ref 35–242)
IMM GRANULOCYTES # BLD: 0.1 10E3/UL
IMM GRANULOCYTES NFR BLD: 0 %
KETONES UR STRIP-MCNC: NEGATIVE MG/DL
LEUKOCYTE ESTERASE UR QL STRIP: NEGATIVE
LYMPHOCYTES # BLD AUTO: 2.8 10E3/UL (ref 0.8–5.3)
LYMPHOCYTES NFR BLD AUTO: 19 %
MCH RBC QN AUTO: 25.1 PG (ref 26.5–33)
MCHC RBC AUTO-ENTMCNC: 32.7 G/DL (ref 31.5–36.5)
MCV RBC AUTO: 77 FL (ref 78–100)
MONOCYTES # BLD AUTO: 1.3 10E3/UL (ref 0–1.3)
MONOCYTES NFR BLD AUTO: 9 %
NEUTROPHILS # BLD AUTO: 9.7 10E3/UL (ref 1.6–8.3)
NEUTROPHILS NFR BLD AUTO: 68 %
NITRATE UR QL: NEGATIVE
NRBC # BLD AUTO: 0 10E3/UL
NRBC BLD AUTO-RTO: 0 /100
PH UR STRIP: 5 [PH] (ref 5–7)
PLATELET # BLD AUTO: 352 10E3/UL (ref 150–450)
PROT/CREAT 24H UR: NORMAL MG/G{CREAT}
RBC # BLD AUTO: 5.01 10E6/UL (ref 3.8–5.2)
RBC URINE: <1 /HPF
RHEUMATOID FACT SERPL-ACNC: <10 IU/ML
SP GR UR STRIP: 1.01 (ref 1–1.03)
SQUAMOUS EPITHELIAL: <1 /HPF
UROBILINOGEN UR STRIP-MCNC: NORMAL MG/DL
VIT D+METAB SERPL-MCNC: 55 NG/ML (ref 20–50)
WBC # BLD AUTO: 14.5 10E3/UL (ref 4–11)
WBC URINE: <1 /HPF

## 2024-02-12 PROCEDURE — 86355 B CELLS TOTAL COUNT: CPT

## 2024-02-12 PROCEDURE — 84590 ASSAY OF VITAMIN A: CPT

## 2024-02-12 PROCEDURE — 36415 COLL VENOUS BLD VENIPUNCTURE: CPT

## 2024-02-12 PROCEDURE — 86037 ANCA TITER EACH ANTIBODY: CPT

## 2024-02-12 PROCEDURE — 82784 ASSAY IGA/IGD/IGG/IGM EACH: CPT

## 2024-02-12 PROCEDURE — 83516 IMMUNOASSAY NONANTIBODY: CPT

## 2024-02-12 PROCEDURE — 82565 ASSAY OF CREATININE: CPT

## 2024-02-12 PROCEDURE — 84460 ALANINE AMINO (ALT) (SGPT): CPT

## 2024-02-12 PROCEDURE — 84446 ASSAY OF VITAMIN E: CPT

## 2024-02-12 PROCEDURE — 96367 TX/PROPH/DG ADDL SEQ IV INF: CPT

## 2024-02-12 PROCEDURE — 96375 TX/PRO/DX INJ NEW DRUG ADDON: CPT

## 2024-02-12 PROCEDURE — 84597 ASSAY OF VITAMIN K: CPT

## 2024-02-12 PROCEDURE — 82306 VITAMIN D 25 HYDROXY: CPT

## 2024-02-12 PROCEDURE — 86431 RHEUMATOID FACTOR QUANT: CPT

## 2024-02-12 PROCEDURE — 84450 TRANSFERASE (AST) (SGOT): CPT

## 2024-02-12 PROCEDURE — 85025 COMPLETE CBC W/AUTO DIFF WBC: CPT

## 2024-02-12 PROCEDURE — 96413 CHEMO IV INFUSION 1 HR: CPT

## 2024-02-12 PROCEDURE — 83876 ASSAY MYELOPEROXIDASE: CPT

## 2024-02-12 PROCEDURE — 85652 RBC SED RATE AUTOMATED: CPT

## 2024-02-12 PROCEDURE — 250N000013 HC RX MED GY IP 250 OP 250 PS 637: Performed by: INTERNAL MEDICINE

## 2024-02-12 PROCEDURE — 84156 ASSAY OF PROTEIN URINE: CPT

## 2024-02-12 PROCEDURE — 86200 CCP ANTIBODY: CPT

## 2024-02-12 PROCEDURE — 82040 ASSAY OF SERUM ALBUMIN: CPT

## 2024-02-12 PROCEDURE — 250N000011 HC RX IP 250 OP 636: Performed by: INTERNAL MEDICINE

## 2024-02-12 PROCEDURE — 96415 CHEMO IV INFUSION ADDL HR: CPT

## 2024-02-12 PROCEDURE — 258N000003 HC RX IP 258 OP 636: Performed by: INTERNAL MEDICINE

## 2024-02-12 PROCEDURE — 81001 URINALYSIS AUTO W/SCOPE: CPT

## 2024-02-12 PROCEDURE — 86140 C-REACTIVE PROTEIN: CPT

## 2024-02-12 RX ORDER — METHYLPREDNISOLONE SODIUM SUCCINATE 125 MG/2ML
125 INJECTION, POWDER, LYOPHILIZED, FOR SOLUTION INTRAMUSCULAR; INTRAVENOUS
Status: CANCELLED
Start: 2024-02-26

## 2024-02-12 RX ORDER — HEPARIN SODIUM,PORCINE 10 UNIT/ML
5-20 VIAL (ML) INTRAVENOUS DAILY PRN
Status: CANCELLED | OUTPATIENT
Start: 2024-02-26

## 2024-02-12 RX ORDER — MEPERIDINE HYDROCHLORIDE 25 MG/ML
25 INJECTION INTRAMUSCULAR; INTRAVENOUS; SUBCUTANEOUS EVERY 30 MIN PRN
Status: CANCELLED | OUTPATIENT
Start: 2024-02-26

## 2024-02-12 RX ORDER — HEPARIN SODIUM,PORCINE 10 UNIT/ML
5-20 VIAL (ML) INTRAVENOUS DAILY PRN
Status: DISCONTINUED | OUTPATIENT
Start: 2024-02-12 | End: 2024-02-12 | Stop reason: HOSPADM

## 2024-02-12 RX ORDER — ACETAMINOPHEN 325 MG/1
650 TABLET ORAL ONCE
Status: COMPLETED | OUTPATIENT
Start: 2024-02-12 | End: 2024-02-12

## 2024-02-12 RX ORDER — HEPARIN SODIUM (PORCINE) LOCK FLUSH IV SOLN 100 UNIT/ML 100 UNIT/ML
5 SOLUTION INTRAVENOUS
Status: DISCONTINUED | OUTPATIENT
Start: 2024-02-12 | End: 2024-02-12 | Stop reason: HOSPADM

## 2024-02-12 RX ORDER — DIPHENHYDRAMINE HYDROCHLORIDE 50 MG/ML
50 INJECTION INTRAMUSCULAR; INTRAVENOUS
Status: COMPLETED | OUTPATIENT
Start: 2024-02-12 | End: 2024-02-12

## 2024-02-12 RX ORDER — ALBUTEROL SULFATE 0.83 MG/ML
2.5 SOLUTION RESPIRATORY (INHALATION)
Status: CANCELLED | OUTPATIENT
Start: 2024-02-26

## 2024-02-12 RX ORDER — EPINEPHRINE 1 MG/ML
0.3 INJECTION, SOLUTION, CONCENTRATE INTRAVENOUS EVERY 5 MIN PRN
Status: CANCELLED | OUTPATIENT
Start: 2024-02-26

## 2024-02-12 RX ORDER — METHYLPREDNISOLONE SODIUM SUCCINATE 125 MG/2ML
81.25 INJECTION, POWDER, LYOPHILIZED, FOR SOLUTION INTRAMUSCULAR; INTRAVENOUS ONCE
Status: COMPLETED | OUTPATIENT
Start: 2024-02-12 | End: 2024-02-12

## 2024-02-12 RX ORDER — HEPARIN SODIUM (PORCINE) LOCK FLUSH IV SOLN 100 UNIT/ML 100 UNIT/ML
5 SOLUTION INTRAVENOUS
Status: CANCELLED | OUTPATIENT
Start: 2024-02-26

## 2024-02-12 RX ORDER — ALBUTEROL SULFATE 90 UG/1
1-2 AEROSOL, METERED RESPIRATORY (INHALATION)
Status: CANCELLED
Start: 2024-02-26

## 2024-02-12 RX ORDER — METHYLPREDNISOLONE SODIUM SUCCINATE 125 MG/2ML
81.25 INJECTION, POWDER, LYOPHILIZED, FOR SOLUTION INTRAMUSCULAR; INTRAVENOUS ONCE
Status: CANCELLED | OUTPATIENT
Start: 2024-02-26

## 2024-02-12 RX ORDER — DIPHENHYDRAMINE HYDROCHLORIDE 50 MG/ML
50 INJECTION INTRAMUSCULAR; INTRAVENOUS
Status: CANCELLED
Start: 2024-02-26

## 2024-02-12 RX ORDER — ACETAMINOPHEN 325 MG/1
650 TABLET ORAL ONCE
Status: CANCELLED
Start: 2024-02-26

## 2024-02-12 RX ADMIN — RITUXIMAB-ABBS 1000 MG: 10 INJECTION, SOLUTION INTRAVENOUS at 09:58

## 2024-02-12 RX ADMIN — DIPHENHYDRAMINE HYDROCHLORIDE 50 MG: 50 INJECTION, SOLUTION INTRAMUSCULAR; INTRAVENOUS at 12:03

## 2024-02-12 RX ADMIN — METHYLPREDNISOLONE SODIUM SUCCINATE 81.25 MG: 125 INJECTION, POWDER, FOR SOLUTION INTRAMUSCULAR; INTRAVENOUS at 09:13

## 2024-02-12 RX ADMIN — ACETAMINOPHEN 650 MG: 325 TABLET, FILM COATED ORAL at 09:08

## 2024-02-12 RX ADMIN — FAMOTIDINE 20 MG: 20 INJECTION, SOLUTION INTRAVENOUS at 12:10

## 2024-02-12 RX ADMIN — DIPHENHYDRAMINE HYDROCHLORIDE 50 MG: 50 INJECTION, SOLUTION INTRAMUSCULAR; INTRAVENOUS at 09:18

## 2024-02-12 RX ADMIN — FAMOTIDINE 20 MG: 20 INJECTION, SOLUTION INTRAVENOUS at 12:15

## 2024-02-12 RX ADMIN — FAMOTIDINE 20 MG: 20 INJECTION, SOLUTION INTRAVENOUS at 12:08

## 2024-02-12 RX ADMIN — SODIUM CHLORIDE 250 ML: 9 INJECTION, SOLUTION INTRAVENOUS at 11:59

## 2024-02-12 ASSESSMENT — PAIN SCALES - GENERAL: PAINLEVEL: SEVERE PAIN (6)

## 2024-02-12 NOTE — PROGRESS NOTES
"Infusion Nursing Note:  Janine Cornell presents today for truxima.    Patient seen by provider today: No   present during visit today: Not Applicable.    Note: Premeds given and truxima titrated starting at 50 and increasing every 30 minutes.  At 200ml/hr, patient states she is \"itchy\" all over and feels like her cheeks are puffy.  Truxima stopped and IV benadryl given followed by famotidine.  Patient states she is feeling much better, itching and puffiness subsided.  Dr. García was notified.  Truxima restarted at 150ml/hr and increased to a max rate of 250ml/hr.  No further problems.      Intravenous Access:  Labs drawn without difficulty.  Peripheral IV placed.    Treatment Conditions:  Biological Infusion Checklist:  ~~~ NOTE: If the patient answers yes to any of the questions below, hold the infusion and contact ordering provider or on-call provider.    Have you recently had an elevated temperature, fever, chills, productive cough, coughing for 3 weeks or longer or hemoptysis,  abnormal vital signs, night sweats,  chest pain or have you noticed a decrease in your appetite, unexplained weight loss or fatigue? Yes, ongoing fatigue, chest pain, night sweats  These are not new symptoms.  Do you have any open wounds or new incisions? No  Do you have any upcoming hospitalizations or surgeries? Does not include esophagogastroduodenoscopy, colonoscopy, endoscopic retrograde cholangiopancreatography (ERCP), endoscopic ultrasound (EUS), dental procedures or joint aspiration/steroid injections No  Do you currently have any signs of illness or infection or are you on any antibiotics? No  Have you had any new, sudden or worsening abdominal pain? No  Have you or anyone in your household received a live vaccination in the past 4 weeks? Please note: No live vaccines while on biologic/chemotherapy until 6 months after the last treatment. Patient can receive the flu vaccine (shot only), pneumovax and the Covid vaccine. " It is optimal for the patient to get these vaccines mid cycle, but they can be given at any time as long as it is not on the day of the infusion. No  Have you recently been diagnosed with any new nervous system diseases (ie. Multiple sclerosis, Guillain [place], seizures, neurological changes) or cancer diagnosis? Are you on any form of radiation or chemotherapy? No  Are you pregnant or breast feeding or do you have plans of pregnancy in the future? No  Have you been having any signs of worsening depression or suicidal ideations?  (benlysta only) No  Have there been any other new onset medical symptoms? No  Have you had any new blood clots? (IVIG only) No      Post Infusion Assessment:  Patient tolerated infusion poorly due to :  reaction, see above,   Blood return noted pre and post infusion.  Site patent and intact, free from redness, edema or discomfort.  No evidence of extravasations.  Access discontinued per protocol.  Biologic Infusion Post Education: Call the triage nurse at your clinic or seek medical attention if you have chills and/or temperature greater than or equal to 100.5, uncontrolled nausea/vomiting, diarrhea, constipation, dizziness, shortness of breath, chest pain, heart palpitations, weakness or any other new or concerning symptoms, questions or concerns.  You cannot have any live virus vaccines prior to or during treatment or up to 6 months post infusion.  If you have an upcoming surgery, medical procedure or dental procedure during treatment, this should be discussed with your ordering physician and your surgeon/dentist.  If you are having any concerning symptom, if you are unsure if you should get your next infusion or wish to speak to a provider before your next infusion, please call your care coordinator or triage nurse at your clinic to notify them so we can adequately serve you.       Discharge Plan:   Patient discharged in stable condition accompanied by: self.  Departure Mode:  Ambulatory.  Will return to clinic Feb 28.    Nahed Yang RN

## 2024-02-12 NOTE — LETTER
March 4, 2024      Janine Cornell  [address] [facility] [address]        Dear ,    We are writing to inform you of your test results.    They are stable.    Resulted Orders   Rheumatoid factor   Result Value Ref Range    Rheumatoid Factor <10 <14 IU/mL   Cyclic Citrullinated Peptide Antibody IgG   Result Value Ref Range    Cyclic Citrullinated Peptide Antibody IgG 1.5 <7.0 U/mL      Comment:      Negative   Immunoglobulins A G and M   Result Value Ref Range    Immunoglobulin G 512 (L) 610 - 1,616 mg/dL    Immunoglobulin A 147 84 - 499 mg/dL    Immunoglobulin M 20 (L) 35 - 242 mg/dL   ANCA IgG by IFA with Reflex to Titer   Result Value Ref Range    Neutrophil Cytoplasmic Antibody <1:10 <1:10    Neutrophil Cytoplasmic Antibody Pattern       The ANCA IFA is <1:10.  No further testing will be performed.   CD19 B Cell Count   Result Value Ref Range    CD19% B Cells <1 (L) 6 - 27 %    Absolute CD19, B Cells <1 (L) 107 - 698 cells/uL    CD19 B Cell Comment     Erythrocyte sedimentation rate auto   Result Value Ref Range    Erythrocyte Sedimentation Rate 17 0 - 30 mm/hr   Protein  random urine   Result Value Ref Range    Total Protein Urine mg/dL <6.0   mg/dL      Comment:      The reference ranges have not been established in urine protein. The results should be integrated into the clinical context for interpretation.    Total Protein Urine mg/mg Creat        Comment:      Unable to calculate, urine creatinine or protein is outside the detectable limits.    Creatinine Urine mg/dL 43.3 mg/dL      Comment:      The reference ranges have not been established in urine creatinine. The results should be integrated into the clinical context for interpretation.   UA with Microscopic reflex to Culture   Result Value Ref Range    Color Urine Light Yellow Colorless, Straw, Light Yellow, Yellow    Appearance Urine Clear Clear    Glucose Urine >=1000 (A) Negative mg/dL    Bilirubin Urine Negative Negative    Ketones Urine  Negative Negative mg/dL    Specific Gravity Urine 1.010 1.003 - 1.035    Blood Urine Negative Negative    pH Urine 5.0 5.0 - 7.0    Protein Albumin Urine Negative Negative mg/dL    Urobilinogen Urine Normal Normal, 2.0 mg/dL    Nitrite Urine Negative Negative    Leukocyte Esterase Urine Negative Negative    RBC Urine <1 <=2 /HPF    WBC Urine <1 <=5 /HPF    Squamous Epithelials Urine <1 <=1 /HPF    Narrative    Urine Culture not indicated   CRP inflammation   Result Value Ref Range    CRP Inflammation 4.76 <5.00 mg/L   AST   Result Value Ref Range    AST 30 0 - 45 U/L   ALT   Result Value Ref Range    ALT 27 0 - 50 U/L   Myeloperoxidase and Proteinase 3 Panel   Result Value Ref Range    MPO Marline IgG Instrument Value 0.7 <3.5 U/mL    Myeloperoxidase Antibody IgG Negative Negative    Proteinase 3 Marline IgG Instrument Value <1.0 <2.0 U/mL    Proteinase 3 Antibody IgG Negative Negative   Albumin level   Result Value Ref Range    Albumin 4.4 3.5 - 5.2 g/dL   Creatinine   Result Value Ref Range    Creatinine 1.07 (H) 0.51 - 0.95 mg/dL    GFR Estimate 60 (L) >60 mL/min/1.73m2   CBC with platelets and differential   Result Value Ref Range    WBC Count 14.5 (H) 4.0 - 11.0 10e3/uL    RBC Count 5.01 3.80 - 5.20 10e6/uL    Hemoglobin 12.6 11.7 - 15.7 g/dL    Hematocrit 38.5 35.0 - 47.0 %    MCV 77 (L) 78 - 100 fL    MCH 25.1 (L) 26.5 - 33.0 pg    MCHC 32.7 31.5 - 36.5 g/dL    RDW 15.2 (H) 10.0 - 15.0 %    Platelet Count 352 150 - 450 10e3/uL    % Neutrophils 68 %    % Lymphocytes 19 %    % Monocytes 9 %    % Eosinophils 4 %    % Basophils 0 %    % Immature Granulocytes 0 %    NRBCs per 100 WBC 0 <1 /100    Absolute Neutrophils 9.7 (H) 1.6 - 8.3 10e3/uL    Absolute Lymphocytes 2.8 0.8 - 5.3 10e3/uL    Absolute Monocytes 1.3 0.0 - 1.3 10e3/uL    Absolute Eosinophils 0.5 0.0 - 0.7 10e3/uL    Absolute Basophils 0.1 0.0 - 0.2 10e3/uL    Absolute Immature Granulocytes 0.1 <=0.4 10e3/uL    Absolute NRBCs 0.0 10e3/uL       If you have any  questions or concerns, please call the clinic at the number listed above.       Sincerely,      Majo Mckinnon MD

## 2024-02-13 LAB
ANCA AB PATTERN SER IF-IMP: NORMAL
C-ANCA TITR SER IF: NORMAL {TITER}

## 2024-02-13 NOTE — TELEPHONE ENCOUNTER
ondansetron (ZOFRAN ODT) 8 MG ODT tab         The original prescription was discontinued on 8/19/2022 by Majo Mckinnon MD. Renewing this prescription may not be appropriate.       Last Office Visit: 2/9/24  Future Office visit:   3/15/24    Routing refill request to provider for review/approval because:  Drug not active on patient's medication list    Lucia Wong RN  P Red Flag Triage/MRT

## 2024-02-14 RX ORDER — ONDANSETRON 8 MG/1
8 TABLET, ORALLY DISINTEGRATING ORAL EVERY 8 HOURS PRN
Qty: 20 TABLET | Refills: 1 | Status: SHIPPED | OUTPATIENT
Start: 2024-02-14 | End: 2024-05-02

## 2024-02-15 LAB
A-TOCOPHEROL VIT E SERPL-MCNC: 14.8 MG/L
ANNOTATION COMMENT IMP: NORMAL
BETA+GAMMA TOCOPHEROL SERPL-MCNC: 1.6 MG/L
CCP AB SER IA-ACNC: 1.5 U/ML
PHYTONADIONE SERPL-MCNC: 0.48 NMOL/L
RETINYL PALMITATE SERPL-MCNC: 0.03 MG/L
VIT A SERPL-MCNC: 0.79 MG/L

## 2024-02-20 ENCOUNTER — OFFICE VISIT (OUTPATIENT)
Dept: ORTHOPEDICS | Facility: CLINIC | Age: 59
End: 2024-02-20
Attending: PHYSICIAN ASSISTANT
Payer: COMMERCIAL

## 2024-02-20 ENCOUNTER — PRE VISIT (OUTPATIENT)
Dept: ORTHOPEDICS | Facility: CLINIC | Age: 59
End: 2024-02-20

## 2024-02-20 ENCOUNTER — TELEPHONE (OUTPATIENT)
Dept: ORTHOPEDICS | Facility: CLINIC | Age: 59
End: 2024-02-20

## 2024-02-20 DIAGNOSIS — M54.17 LUMBOSACRAL RADICULOPATHY: Primary | ICD-10-CM

## 2024-02-20 DIAGNOSIS — E11.49 TYPE II OR UNSPECIFIED TYPE DIABETES MELLITUS WITH NEUROLOGICAL MANIFESTATIONS, UNCONTROLLED(250.62) (H): ICD-10-CM

## 2024-02-20 DIAGNOSIS — E11.65 TYPE II OR UNSPECIFIED TYPE DIABETES MELLITUS WITH NEUROLOGICAL MANIFESTATIONS, UNCONTROLLED(250.62) (H): ICD-10-CM

## 2024-02-20 PROCEDURE — 99203 OFFICE O/P NEW LOW 30 MIN: CPT | Performed by: PODIATRIST

## 2024-02-20 NOTE — TELEPHONE ENCOUNTER
Called and left voicemail for patient. Requesting that patient come in sooner for appointment with Dr. Cruz today as he is not feeling well. Otherwise suggest switching appointment to another day. Requested callback to discuss.

## 2024-02-20 NOTE — PROGRESS NOTES
Date of Service: 2/20/2024    Chief Complaint:   Chief Complaint   Patient presents with    Consult     Pain, bilateral foot. Patient relates that she has pain, numbness, tingling.         HPI: Janine is a 58 year old female who presents today for further evaluation of BL foot paresthesia. She notes that she has seen multiple doctors for this. She has tried gabapentin, but it gave her migraines. She was recently rx'ed Lyrica by Dr. Solano, but is awaiting ins approval for this. She has been diabetic for decades with poor control. Cervical MRIs have shown severe spinal canal stenosis at C6. She notes that the pain is burning, tingling, and pins and needles.     Review of Systems: No n/v/d/f/c/ns/sob/cp    PMH:   Past Medical History:   Diagnosis Date    Abnormal glandular Papanicolaou smear of cervix 1992    Allergic rhinitis     Allergy, airborne subst    Arthritis     ASCVD (arteriosclerotic cardiovascular disease)     Chronic pain     Chronic pancreatitis (H)     idiopathic, Tx: PPI, antioxidants, pancreatic enzymes    Common migraine     Coronary artery disease     Costochondritis     Difficulty in walking(719.7)     Dyspnea on exertion     Ectasia, mammary duct     [facility] nipple discharge    Elevated fasting glucose     Gastro-oesophageal reflux disease     Granulomatosis with polyangiitis (H)     Hernia     History of angina     Hyperlipidemia LDL goal < 100     Hypertension goal BP (blood pressure) < 140/90     Essential hypertension    Iron deficiency anemia     Ischemic cardiomyopathy     Menorrhagia     Migraine headaches     Mild persistent asthma     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 Matias, neg w/u, Rx'd w/prednisone    Ventral hernia, unspecified, without mention of obstruction or gangrene 2012       PSxH:   Past Surgical History:   Procedure Laterality Date    C/SECTION, LOW TRANSVERSE  1996        CARDIAC SURGERY      cardiac stent- recent in 16; 4 other stents    COLONOSCOPY N/A 2023    Procedure: COLONOSCOPY, WITH POLYPECTOMY;  Surgeon: Percy Gross MD;  Location: [facility] OR    DILATION AND CURETTAGE N/A 2016    Procedure: DILATION AND CURETTAGE;  Surgeon: Nahed Butler MD;  [facility]    ENDOMETRIAL SAMPLING (BIOPSY) N/A 2023    Procedure: ENDOMETRIAL BIOPSY;  Surgeon: Nahed Butler MD;  [facility]    ESOPHAGOSCOPY, GASTROSCOPY, DUODENOSCOPY (EGD), COMBINED N/A 2022    Procedure: ESOPHAGOGASTRODUODENOSCOPY, WITH BIOPSY;  Surgeon: Enzo Sesay MD;  Location: [facility]    ESOPHAGOSCOPY, GASTROSCOPY, DUODENOSCOPY (EGD), COMBINED N/A 2023    Procedure: ESOPHAGOGASTRODUODENOSCOPY, WITH BIOPSY;  Surgeon: Percy Gross MD;  Location: [facility] OR    EXAM UNDER ANESTHESIA PELVIC N/A 2023    Procedure: EXAM UNDER ANESTHESIA;  Surgeon: Nahed Butler MD;  [facility]    HC UGI ENDOSCOPY W EUS  2008    Dr. Pastrana, possible chronic pancreatitis, fatty liver    [place] [facility] [place] INTRAUTERINE DEVICE N/A 2016    Procedure: INSERT INTRAUTERINE DEVICE;  Surgeon: Nahed Butler MD;  [facility]    INT UTERINE FIBRIOD EMBOLIZATION  10/29/2014    LAPAROSCOPIC CHOLECYSTECTOMY  2008     Dr. Arnol GRUBBS TX, CERVICAL  1992    s/p HUMERA, done at [facility] in [place].    ORBITOTOMY Right 03/15/2016    Procedure: ORBITOTOMY;  Surgeon: Myron Cyr MD;  Location: [facility]    ORBITOTOMY Right 2017    Procedure: ORBITOTOMY;  RIGHT ORBITOTOMY AND BIOPSY;  Surgeon: Charis Holbrook MD;  Location: [facility]    REMOVE INTRAUTERINE DEVICE N/A 2023    Procedure: Remove intrauterine device,;  Surgeon: Nahed Butler MD;  [facility]    REPAIR PTOSIS Right 2017    Procedure: REPAIR PTOSIS;  RIGHT UPPER LID PTOSIS REPAIR;  Surgeon: Myron Cyr MD;  Location: [facility]    UPPER GI ENDOSCOPY  10/21/2008    mild gastritis, Dr. Rocky CALDERA ECHO HEART XTHORACIC,COMPLETE, W/O DOPPLER  2004    [facility] WNL, EF 60%       Allergies: Amoxicillin-pot clavulanate, Amoxicillin-pot clavulanate, Artificial sweetner (informational only) , Azithromycin, Clavulanic acid, Diatrizoate, Imitrex [triptans], Penicillins, Pork allergy, Shellfish allergy, Shellfish-derived products, Sulfa antibiotics, Sulfatolamide, and Zithromax [azithromycin dihydrate]    SH:   Social History     Socioeconomic History    Marital status: Single     Spouse name: Not on file    Number of children: 1    Years of education: Not on file    Highest education level: Not on file   Occupational History     Employer: NONE    Tobacco Use    Smoking status: Former     Packs/day: 0.20     Years: 1.00     Additional pack years: 0.00     Total pack years: 0.20     Types: Cigarettes     Quit date: 2016     Years since quittin.0    Smokeless tobacco: Never   Vaping Use    Vaping Use: Every day    Substances: Nicotine   Substance and Sexual Activity    Alcohol use: No     Alcohol/week: 0.0 standard drinks of alcohol    Drug use: No    Sexual activity: Not Currently   Other Topics Concern    Parent/sibling w/ CABG, MI or angioplasty before 65F 55M? Yes   Social History Narrative    Balanced Diet -  sometimes    Osteoporosis Prevention Measures - Dairy servings per day: 2 servings weekly    Regular Exercise -  Yes Describe walking 4 times a week    Dental Exam - NO    Seatbelts used - Yes    Self Breast Exam - Yes    Abuse: Current or Past (Physical, Sexual or Emotional)- No    Do you have any concerns about STD's -  No    Do you feel safe in your environment - No    Guns stored in the home - No    Sunscreen used - Yes    Lipids -  YES - Date: 053102    Glucose -  YES - Date: 012804    Eye Exam - YES - Date: one year ago    Colon Cancer Screening - No    Hemoccults - NO    Pap Test -  YES - Date: 070904, remote history of LEEP    Mammography - YES - Date: last spring, would like to discuss, needs a referral to [facility]    DEXA - NO    Immunizations reviewed and up to date - Yes, last td given in 1997 or 1998     Social Determinants of Health     Financial Resource Strain: Low Risk  (2/9/2024)    Financial Resource Strain     Within the past 12 months, have you or your family members you live with been unable to get utilities (heat, electricity) when it was really needed?: No   Food Insecurity: High Risk (2/9/2024)    Food Insecurity     Within the past 12 months, did you worry that your food would run out before you got money to buy more?: Yes     Within the past 12 months, did the food you bought just not last and you didn t have money to get more?: No   Transportation Needs: Low Risk  (2/9/2024)    Transportation Needs     Within the past 12 months, has lack of transportation kept you from medical appointments, getting your medicines, non-medical meetings or appointments, work, or from getting things that you need?: No   Physical Activity: Not on file   Stress: Not on file   Social Connections: Not on file   Interpersonal Safety: Low Risk  (2/9/2024)    Interpersonal Safety     Do you feel physically and emotionally safe where you currently live?: Yes     Within the past 12 months, have you  been hit, slapped, kicked or otherwise physically hurt by someone?: No     Within the past 12 months, have you been humiliated or emotionally abused in other ways by your partner or ex-partner?: No   Housing Stability: Low Risk  (2024)    Housing Stability     Do you have housing? : Yes     Are you worried about losing your housing?: No       FH:   Family History   Problem Relation Age of Onset    Hypertension Mother     Arthritis Mother     Heart Disease Mother         long qt syndrome    Thyroid Disease Mother     C.A.D. Mother     Heart Disease Father 50        heart attack    Cerebrovascular Disease Father     Diabetes Father     Hypertension Father     Depression Father     C.A.D. Father     Neurologic Disorder Sister         migraines    Neurologic Disorder Sister         migraines    Heart Disease Brother 15        MI at 15, 16.     Arthritis Brother         he passed away, had severe arthritis at age 11    C.A.D. Brother     Anesthesia Reaction Son         PONV    Respiratory Son         asthma    Hypertension Maternal Aunt     Diabetes Maternal Uncle     Hypertension Maternal Uncle     Arthritis Maternal Uncle     Eye Disorder Maternal Uncle         cataracts    Cerebrovascular Disease Maternal Uncle     Family History Negative Other         neg for RA, SLE    Unknown/Adopted No family hx of         unknown neurological issues in her family, mother was adopted    Skin Cancer No family hx of         No known family hx of skin cancer    Deep Vein Thrombosis (DVT) No family hx of        Objective:  Hemoglobin A1C   Date Value Ref Range Status   2023 9.5 (H) <5.7 % Final     Comment:     Normal <5.7%   Prediabetes 5.7-6.4%    Diabetes 6.5% or higher     Note: Adopted from ADA consensus guidelines.   2022 8.5 (H) <5.7 % Final     Comment:     Normal <5.7%   Prediabetes 5.7-6.4%    Diabetes 6.5% or higher     Note: Adopted from ADA consensus guidelines.   2022 10.3 (H) 0.0 - 5.6 %  Final     Comment:     Normal <5.7%   Prediabetes 5.7-6.4%    Diabetes 6.5% or higher     Note: Adopted from ADA consensus guidelines.   06/18/2020 8.0 (H) 0 - 5.6 % Final     Comment:     Normal <5.7% Prediabetes 5.7-6.4%  Diabetes 6.5% or higher - adopted from ADA   consensus guidelines.     03/06/2019 9.2 (H) 0 - 5.6 % Final     Comment:     Normal <5.7% Prediabetes 5.7-6.4%  Diabetes 6.5% or higher - adopted from ADA   consensus guidelines.     11/09/2018 9.6 (H) 0 - 5.6 % Final     Comment:     Normal <5.7% Prediabetes 5.7-6.4%  Diabetes 6.5% or higher - adopted from ADA   consensus guidelines.       Hemoglobin A1C POCT   Date Value Ref Range Status   10/18/2023 8.3 4.3 - <5.7 % Final   04/14/2014 9.2 (A) 4.3 - 6 % Final         PT and DP pulses are 2/4 bilaterally. CRT is instant. Positive pedal hair.   Gross sensation is diminished bilaterally. Protective sensation is intact as demonstrated with a 5.07G monofilament. +Tinel's sign with percussion of the right tibial and common peroneal nerves. Negative on the left. + SLT BL.   Equinus isnoted bilaterally. No pain with active or passive ROM of the ankle, MTJ, 1st ray, or halluces bilaterally,.   Nails normal bilaterally. No open lesions are noted.     Assessment:   Encounter Diagnoses   Name Primary?    Lumbosacral radiculopathy Yes    Type II or unspecified type diabetes mellitus with neurological manifestations, uncontrolled(250.62) (H)          Plan:  - Pt seen and evaluated.  - Chart reviewed, including outside neurology notes.   - I discussed with her that she likely has overlapping etiologies. One is likely her spine and the other her uncontrolled, chronic diabetes. I discussed with her that the diabetes is symptom control. She is awaiting Lyrica.   - She needs to see our [place] [facility]. Referral written.  - See again PRN.

## 2024-02-20 NOTE — TELEPHONE ENCOUNTER
Called and spoke with patient. Patient declined to come in sooner. We will see her at her scheduled appointment time.

## 2024-02-20 NOTE — LETTER
2/20/2024         RE: Janine Cornell  [address]  [facility] [address]        Dear Colleague,    Thank you for referring your patient, Janine Cornell, to the [facility] [place]. Please see a copy of my visit note below.    Date of Service: 2/20/2024    Chief Complaint:   Chief Complaint   Patient presents with    Consult     Pain, bilateral foot. Patient relates that she has pain, numbness, tingling.         HPI: Janine is a 58 year old female who presents today for further evaluation of BL foot paresthesia. She notes that she has seen multiple doctors for this. She has tried gabapentin, but it gave her migraines. She was recently rx'ed Lyrica by Dr. Solano, but is awaiting ins approval for this. She has been diabetic for decades with poor control. Cervical MRIs have shown severe spinal canal stenosis at C6. She notes that the pain is burning, tingling, and pins and needles.     Review of Systems: No n/v/d/f/c/ns/sob/cp    PMH:   Past Medical History:   Diagnosis Date    Abnormal glandular Papanicolaou smear of cervix 1992    Allergic rhinitis     Allergy, airborne subst    Arthritis     ASCVD (arteriosclerotic cardiovascular disease)     Chronic pain     Chronic pancreatitis (H)     idiopathic, Tx: PPI, antioxidants, pancreatic enzymes    Common migraine     Coronary artery disease     Costochondritis     Difficulty in walking(719.7)     Dyspnea on exertion     Ectasia, mammary duct     [facility], persistent nipple discharge    Elevated fasting glucose     Gastro-oesophageal reflux disease     Granulomatosis with polyangiitis (H)     Hernia     History of angina     Hyperlipidemia LDL goal < 100     Hypertension goal BP (blood pressure) < 140/90     Essential hypertension    Iron deficiency anemia     Ischemic cardiomyopathy     Menorrhagia     Migraine headaches     Mild persistent asthma     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,   Cindy, neg w/u    NSTEMI (non-ST elevated myocardial infarction) (H) 2011    Numbness and tingling     Numbness of feet     Obesity     PCOS (polycystic ovarian syndrome)     PCOS    PONV (postoperative nausea and vomiting)     S/P coronary artery stent placement 2011    LAD x2; D1 x 1; RCA x1    Seasonal affective disorder (H24)     Shortness of breath     Stented coronary artery     4 STENTS- 11    Type 2 diabetes, HbA1c goal < 7% (H) 2010    Unspecified cerebral artery occlusion with cerebral infarction     Uterine leiomyoma     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 Matias, neg w/u, Rx'd w/prednisone    Ventral hernia, unspecified, without mention of obstruction or gangrene 2012       PSxH:   Past Surgical History:   Procedure Laterality Date    C/SECTION, LOW TRANSVERSE  1996        CARDIAC SURGERY      cardiac stent- recent in 16; 4 other stents    COLONOSCOPY N/A 2023    Procedure: COLONOSCOPY, WITH POLYPECTOMY;  Surgeon: Percy Gross MD;  Location: [facility] OR    DILATION AND CURETTAGE N/A 2016    Procedure: DILATION AND CURETTAGE;  Surgeon: Nahed Butler MD;  [facility]    ENDOMETRIAL SAMPLING (BIOPSY) N/A 2023    Procedure: ENDOMETRIAL BIOPSY;  Surgeon: Nahed Butler MD;  [facility]    ESOPHAGOSCOPY, GASTROSCOPY, DUODENOSCOPY (EGD), COMBINED N/A 2022    Procedure: ESOPHAGOGASTRODUODENOSCOPY, WITH BIOPSY;  Surgeon: Enzo Sesay MD;  [facility]: [facility]    ESOPHAGOSCOPY, GASTROSCOPY, DUODENOSCOPY (EGD), COMBINED N/A 2023    Procedure: ESOPHAGOGASTRODUODENOSCOPY, WITH BIOPSY;  Surgeon: Percy Gross MD;  Location: [facility] OR    EXAM UNDER ANESTHESIA PELVIC N/A 2023    Procedure: EXAM UNDER ANESTHESIA;  Surgeon: Nahed Butler MD;  [facility]    HC UGI ENDOSCOPY W EUS  2008    Dr. Pastrana, possible chronic pancreatitis, fatty liver    HERNIA REPAIR   2012    done at [facility]    INSERT INTRAUTERINE DEVICE N/A 2016    Procedure: INSERT INTRAUTERINE DEVICE;  Surgeon: Nahed Butler MD;  [facility]    INT UTERINE FIBRIOD EMBOLIZATION  10/29/2014    LAPAROSCOPIC CHOLECYSTECTOMY  2008    Dr. Arnol GRUBBS TX, CERVICAL  1992    s/p GORDONP, done at [facility] in [place].    ORBITOTOMY Right 03/15/2016    Procedure: ORBITOTOMY;  Surgeon: Myron Cyr MD;  Location: [facility]    ORBITOTOMY Right 2017    Procedure: ORBITOTOMY;  RIGHT ORBITOTOMY AND BIOPSY;  Surgeon: Charis Holbrook MD;  Location: [facility]    REMOVE INTRAUTERINE DEVICE N/A 2023    Procedure: Remove intrauterine device,;  Surgeon: Nahed Butler MD;  [facility]    REPAIR PTOSIS Right 2017    Procedure: REPAIR PTOSIS;  RIGHT UPPER LID PTOSIS REPAIR;  Surgeon: Myron Cyr MD;  Location: [facility]    UPPER GI ENDOSCOPY  10/21/2008    mild gastritis, Dr. Rocky [facility]DERA ECHO HEART XTHORACIC,COMPLETE, W/O DOPPLER  2004    [facility] WNL, EF 60%       Allergies: Amoxicillin-pot clavulanate, Amoxicillin-pot clavulanate, Artificial sweetner (informational only) , Azithromycin, Clavulanic acid, Diatrizoate, Imitrex [triptans], Penicillins, Pork allergy, Shellfish allergy, Shellfish-derived products, Sulfa antibiotics, Sulfatolamide, and Zithromax [azithromycin dihydrate]    SH:   Social History     Socioeconomic History    Marital status: Single     Spouse name: Not on file    Number of children: 1    Years of education: Not on file    Highest education level: Not on file   Occupational History     Employer: NONE    Tobacco Use    Smoking status: Former     Packs/day: 0.20     Years: 1.00     Additional pack years: 0.00     Total pack years: 0.20     Types: Cigarettes     Quit date: 2016     Years since quittin.0    Smokeless tobacco: Never   Vaping Use    Vaping Use: Every day    Substances: Nicotine   Substance and Sexual  Activity    Alcohol use: No     Alcohol/week: 0.0 standard drinks of alcohol    Drug use: No    Sexual activity: Not Currently   Other Topics Concern    Parent/sibling w/ CABG, MI or angioplasty before 65F 55M? Yes   Social History Narrative    Balanced Diet - sometimes    Osteoporosis Prevention Measures - Dairy servings per day: 2 servings weekly    Regular Exercise -  Yes Describe walking 4 times a week    Dental Exam - NO    Seatbelts used - Yes    Self Breast Exam - Yes    Abuse: Current or Past (Physical, Sexual or Emotional)- No    Do you have any concerns about STD's -  No    Do you feel safe in your environment - No    Guns stored in the home - No    Sunscreen used - Yes    Lipids -  YES - Date: 053102    Glucose -  YES - Date: 012804    Eye Exam - YES - Date: one year ago    Colon Cancer Screening - No    Hemoccults - NO    Pap Test -  YES - Date: 070904, remote history of LEEP    Mammography - YES - Date: last spring, would like to discuss, needs a referral to [facility] [place]    DEXA - NO    Immunizations reviewed and up to date - Yes, last td given in 1997 or 1998     Social Determinants of Health     Financial Resource Strain: Low Risk  (2/9/2024)    Financial Resource Strain     Within the past 12 months, have you or your family members you live with been unable to get utilities (heat, electricity) when it was really needed?: No   Food Insecurity: High Risk (2/9/2024)    Food Insecurity     Within the past 12 months, did you worry that your food would run out before you got money to buy more?: Yes     Within the past 12 months, did the food you bought just not last and you didn t have money to get more?: No   Transportation Needs: Low Risk  (2/9/2024)    Transportation Needs     Within the past 12 months, has lack of transportation kept you from medical appointments, getting your medicines, non-medical meetings or appointments, work, or from getting things that you need?: No   Physical  Activity: Not on file   Stress: Not on file   Social Connections: Not on file   Interpersonal Safety: Low Risk  (2024)    Interpersonal Safety     Do you feel physically and emotionally safe where you currently live?: Yes     Within the past 12 months, have you been hit, slapped, kicked or otherwise physically hurt by someone?: No     Within the past 12 months, have you been humiliated or emotionally abused in other ways by your partner or ex-partner?: No   Housing Stability: Low Risk  (2024)    Housing Stability     Do you have housing? : Yes     Are you worried about losing your housing?: No       FH:   Family History   Problem Relation Age of Onset    Hypertension Mother     Arthritis Mother     Heart Disease Mother         long qt syndrome    Thyroid Disease Mother     C.A.D. Mother     Heart Disease Father 50        heart attack    Cerebrovascular Disease Father     Diabetes Father     Hypertension Father     Depression Father     C.A.D. Father     Neurologic Disorder Sister         migraines    Neurologic Disorder Sister         migraines    Heart Disease Brother 15        MI at 15, 16.     Arthritis Brother         he passed away, had severe arthritis at age 11    C.A.D. Brother     Anesthesia Reaction Son         PONV    Respiratory Son         asthma    Hypertension Maternal Aunt     Diabetes Maternal Uncle     Hypertension Maternal Uncle     Arthritis Maternal Uncle     Eye Disorder Maternal Uncle         cataracts    Cerebrovascular Disease Maternal Uncle     Family History Negative Other         neg for RA, SLE    Unknown/Adopted No family hx of         unknown neurological issues in her family, mother was adopted    Skin Cancer No family hx of         No known family hx of skin cancer    Deep Vein Thrombosis (DVT) No family hx of        Objective:  Hemoglobin A1C   Date Value Ref Range Status   2023 9.5 (H) <5.7 % Final     Comment:     Normal <5.7%   Prediabetes 5.7-6.4%     Diabetes 6.5% or higher     Note: Adopted from ADA consensus guidelines.   11/23/2022 8.5 (H) <5.7 % Final     Comment:     Normal <5.7%   Prediabetes 5.7-6.4%    Diabetes 6.5% or higher     Note: Adopted from ADA consensus guidelines.   08/19/2022 10.3 (H) 0.0 - 5.6 % Final     Comment:     Normal <5.7%   Prediabetes 5.7-6.4%    Diabetes 6.5% or higher     Note: Adopted from ADA consensus guidelines.   06/18/2020 8.0 (H) 0 - 5.6 % Final     Comment:     Normal <5.7% Prediabetes 5.7-6.4%  Diabetes 6.5% or higher - adopted from ADA   consensus guidelines.     03/06/2019 9.2 (H) 0 - 5.6 % Final     Comment:     Normal <5.7% Prediabetes 5.7-6.4%  Diabetes 6.5% or higher - adopted from ADA   consensus guidelines.     11/09/2018 9.6 (H) 0 - 5.6 % Final     Comment:     Normal <5.7% Prediabetes 5.7-6.4%  Diabetes 6.5% or higher - adopted from ADA   consensus guidelines.       Hemoglobin A1C POCT   Date Value Ref Range Status   10/18/2023 8.3 4.3 - <5.7 % Final   04/14/2014 9.2 (A) 4.3 - 6 % Final         PT and DP pulses are 2/4 bilaterally. CRT is instant. Positive pedal hair.   Gross sensation is diminished bilaterally. Protective sensation is intact as demonstrated with a 5.07G monofilament. +Tinel's sign with percussion of the right tibial and common peroneal nerves. Negative on the left. + SLT BL.   Equinus isnoted bilaterally. No pain with active or passive ROM of the ankle, MTJ, 1st ray, or halluces bilaterally,.   Nails normal bilaterally. No open lesions are noted.     Assessment:   Encounter Diagnoses   Name Primary?    Lumbosacral radiculopathy Yes    Type II or unspecified type diabetes mellitus with neurological manifestations, uncontrolled(250.62) (H)          Plan:  - Pt seen and evaluated.  - Chart reviewed, including outside neurology notes.   - I discussed with her that she likely has overlapping etiologies. One is likely her spine and the other her uncontrolled, chronic diabetes. I discussed with her  that the diabetes is symptom control. She is awaiting Lyrica.   - She needs to see [facility] [place] [facility]. Referral written.  - See again PRN.            Rosendo Cruz DPM

## 2024-02-21 ENCOUNTER — HOSPITAL ENCOUNTER (OUTPATIENT)
Dept: MRI IMAGING | Facility: CLINIC | Age: 59
Discharge: HOME | End: 2024-02-21
Attending: PHYSICIAN ASSISTANT | Admitting: PHYSICIAN ASSISTANT
Payer: COMMERCIAL

## 2024-02-21 DIAGNOSIS — K86.1 CHRONIC PANCREATITIS, UNSPECIFIED PANCREATITIS TYPE (H): ICD-10-CM

## 2024-02-21 PROCEDURE — A9585 GADOBUTROL INJECTION: HCPCS | Performed by: PHYSICIAN ASSISTANT

## 2024-02-21 PROCEDURE — 999N000127 HC STATISTIC PERIPHERAL IV START W US GUIDANCE

## 2024-02-21 PROCEDURE — 74183 MRI ABD W/O CNTR FLWD CNTR: CPT

## 2024-02-21 PROCEDURE — 255N000002 HC RX 255 OP 636: Performed by: PHYSICIAN ASSISTANT

## 2024-02-21 PROCEDURE — 250N000011 HC RX IP 250 OP 636: Mod: JZ | Performed by: PHYSICIAN ASSISTANT

## 2024-02-21 PROCEDURE — 74183 MRI ABD W/O CNTR FLWD CNTR: CPT | Mod: 26 | Performed by: RADIOLOGY

## 2024-02-21 PROCEDURE — 250N000009 HC RX 250: Performed by: PHYSICIAN ASSISTANT

## 2024-02-21 RX ORDER — GADOBUTROL 604.72 MG/ML
7.5 INJECTION INTRAVENOUS ONCE
Status: COMPLETED | OUTPATIENT
Start: 2024-02-21 | End: 2024-02-21

## 2024-02-21 RX ORDER — GADOBUTROL 604.72 MG/ML
7.5 INJECTION INTRAVENOUS ONCE
Status: DISCONTINUED | OUTPATIENT
Start: 2024-02-21 | End: 2024-02-21

## 2024-02-21 RX ADMIN — HUMAN SECRETIN 15.28 MCG: 16 INJECTION, POWDER, LYOPHILIZED, FOR SOLUTION INTRAVENOUS at 12:00

## 2024-02-21 RX ADMIN — HUMAN SECRETIN 0.2 MCG: 16 INJECTION, POWDER, LYOPHILIZED, FOR SOLUTION INTRAVENOUS at 11:58

## 2024-02-21 RX ADMIN — GADOBUTROL 7.5 ML: 604.72 INJECTION INTRAVENOUS at 11:11

## 2024-02-22 ENCOUNTER — TELEPHONE (OUTPATIENT)
Dept: INTERNAL MEDICINE | Facility: CLINIC | Age: 59
End: 2024-02-22

## 2024-02-22 ENCOUNTER — TELEPHONE (OUTPATIENT)
Dept: FAMILY MEDICINE | Facility: CLINIC | Age: 59
End: 2024-02-22

## 2024-02-22 DIAGNOSIS — N64.4 BREAST PAIN: Primary | ICD-10-CM

## 2024-02-22 NOTE — TELEPHONE ENCOUNTER
----- Message from Katy Ferrera RN sent at 2/22/2024 10:34 AM CST -----  Ordered. Please schedule for her. Thank you.    Katy  ----- Message -----  From: Antonella Arango  Sent: 2/21/2024   3:35 PM CST  To: Katy Ferrera RN    Pt went for mammogram today and said she was experiencing pain. Pt was informed that she will need PCP to put in an order for a diagnostic mammogram. Is this possible?    Thank you,    Antonella

## 2024-02-22 NOTE — TELEPHONE ENCOUNTER
Ordered.    Soon-Mi  ------------------------------------------------------------------------        ----- Message from Antonella Arango sent at 2/21/2024  3:34 PM CST -----  Pt went for mammogram today and said she was experiencing pain. Pt was informed that she will need PCP to put in an order for a diagnostic mammogram. Is this possible?    Thank you,    Antonella

## 2024-02-23 ENCOUNTER — TELEPHONE (OUTPATIENT)
Dept: GASTROENTEROLOGY | Facility: CLINIC | Age: 59
End: 2024-02-23
Payer: COMMERCIAL

## 2024-02-23 DIAGNOSIS — J45.50 SEVERE PERSISTENT ASTHMA, UNSPECIFIED WHETHER COMPLICATED (H): Primary | ICD-10-CM

## 2024-02-23 NOTE — TELEPHONE ENCOUNTER
"LPN spoke with patient and notified her of results and recommendations. Patient stated \"I am not taking any vitamin D supplements, I haven't in about a month so what would cause it to be elevated\" Patient is wondering what the enlarged fatty liver means and what she should do about it. Patient asked what the mass like thickening on the pancreas head means. Patient stated \"is this going to be passed on to my primary to see?\" When writer was discussing a possible pain management referral, patient stated \"you know I am really not interested in seeing pain management. That is throwing more medicine at a problem that is internal and its just throwing more doctors in the mix. This started as me seeing my primary, then a diabetic doctor, then you guys, and now maybe pain management. All these things get costly as well. Maybe I need to just talk to her. LPN stated \"a visit would be an excellent idea, that way you have a time to discuss with [facility] your questions and concerns.\" Patient was agreeable to this and scheduled a virtual visit on 3/13/24. Patient asked if her questions regarding the enlarged fatty liver, mass like thickening on the pancreas head and her vitamin D could still be passed on to [facility] for some insight prior to the appointment.    Routing message to provider.    Pretty Acevedo LPN    "

## 2024-02-23 NOTE — TELEPHONE ENCOUNTER
Please contact patient with her results:     Blood work:   - vitamin D is slightly elevated, should stop vit D supplements  - the remainder of fat-soluble vitamins were wnl.     MRI pancreas  - chronic pancreatitis and enlarged fatty liver.   - there is a masslike thickening of the pancr head which has decreased in size compared to prior imaging in 2021. I will have this reviewed to determine if further testing is needed in this regard.   - if further work up is not needed then let's consider pain mgmt referral to address pain related to chronic pancreatitis (celiac plexus block)     Benjamin Hyman PA-C

## 2024-02-23 NOTE — TELEPHONE ENCOUNTER
I agree, that a visit would be best to review everything in detail.     Fatty liver is something found in persons who have genetic risk for it plus one or more additional factors. The additional reasons are: lack of daily physical activity, increasing blood sugar with risk of Type 2 Diabetes, high cholesterol or triglycerides, extra weight in the abdomen, and or regular alcohol intake. Fatty liver sometimes has complications of cirrhosis or liver cancer. A healthy diet with needed calories only, no alcohol, and daily walking or other exercise is recommended as the most successful way to manage fatty liver.      This mass like area was first seen in 2021. We reviewed that at her last visit which is one of the reasons why I ordered the MRI Pancreas. I am having Dr. Earl review it to determine if it needs an EUS. It is actually smaller than it was in 2021 which is reassuring.     The pain mgmt referral was more so to see if a celiac plexus nerve block would help. Not for more medications.     The vitamin D might have been higher back when she was on supplements. If she is off of supplements that is good. Remain off for now.       Benjamin Hyman PA-C

## 2024-02-23 NOTE — TELEPHONE ENCOUNTER
LPN called patient to discuss results in previous note from Benjamin Hyman PA-C. Patient asked that writer call back in about an hour as she was at an appointment.      Benjamin Hyman PA-C  [facility]1 hour ago (11:48 AM)     Please contact pt with results.  Will plan to have MRI reviewed by Dr. Earl and provide further info when able.    LANI Bello LPN

## 2024-02-28 ENCOUNTER — INFUSION THERAPY VISIT (OUTPATIENT)
Dept: INFUSION THERAPY | Facility: CLINIC | Age: 59
End: 2024-02-28
Attending: INTERNAL MEDICINE
Payer: COMMERCIAL

## 2024-02-28 VITALS
OXYGEN SATURATION: 99 % | DIASTOLIC BLOOD PRESSURE: 79 MMHG | HEART RATE: 79 BPM | TEMPERATURE: 98.6 F | SYSTOLIC BLOOD PRESSURE: 117 MMHG

## 2024-02-28 DIAGNOSIS — J45.50 SEVERE PERSISTENT ASTHMA, UNSPECIFIED WHETHER COMPLICATED (H): ICD-10-CM

## 2024-02-28 DIAGNOSIS — I77.82 ANCA-ASSOCIATED VASCULITIS (H): ICD-10-CM

## 2024-02-28 DIAGNOSIS — M31.30 GRANULOMATOSIS WITH POLYANGIITIS WITHOUT RENAL INVOLVEMENT (H): Primary | ICD-10-CM

## 2024-02-28 PROCEDURE — 96375 TX/PRO/DX INJ NEW DRUG ADDON: CPT

## 2024-02-28 PROCEDURE — 258N000003 HC RX IP 258 OP 636: Performed by: INTERNAL MEDICINE

## 2024-02-28 PROCEDURE — 96413 CHEMO IV INFUSION 1 HR: CPT

## 2024-02-28 PROCEDURE — 96367 TX/PROPH/DG ADDL SEQ IV INF: CPT

## 2024-02-28 PROCEDURE — 250N000011 HC RX IP 250 OP 636: Performed by: INTERNAL MEDICINE

## 2024-02-28 PROCEDURE — 82785 ASSAY OF IGE: CPT

## 2024-02-28 PROCEDURE — 96415 CHEMO IV INFUSION ADDL HR: CPT

## 2024-02-28 PROCEDURE — 36415 COLL VENOUS BLD VENIPUNCTURE: CPT

## 2024-02-28 PROCEDURE — 250N000013 HC RX MED GY IP 250 OP 250 PS 637: Performed by: INTERNAL MEDICINE

## 2024-02-28 RX ORDER — MEPERIDINE HYDROCHLORIDE 25 MG/ML
25 INJECTION INTRAMUSCULAR; INTRAVENOUS; SUBCUTANEOUS EVERY 30 MIN PRN
Status: CANCELLED | OUTPATIENT
Start: 2024-03-11

## 2024-02-28 RX ORDER — ALBUTEROL SULFATE 90 UG/1
1-2 AEROSOL, METERED RESPIRATORY (INHALATION)
Status: CANCELLED
Start: 2024-03-11

## 2024-02-28 RX ORDER — EPINEPHRINE 1 MG/ML
0.3 INJECTION, SOLUTION, CONCENTRATE INTRAVENOUS EVERY 5 MIN PRN
Status: CANCELLED | OUTPATIENT
Start: 2024-03-11

## 2024-02-28 RX ORDER — METHYLPREDNISOLONE SODIUM SUCCINATE 125 MG/2ML
81.25 INJECTION, POWDER, LYOPHILIZED, FOR SOLUTION INTRAMUSCULAR; INTRAVENOUS ONCE
Status: COMPLETED | OUTPATIENT
Start: 2024-02-28 | End: 2024-02-28

## 2024-02-28 RX ORDER — ACETAMINOPHEN 325 MG/1
650 TABLET ORAL ONCE
Status: COMPLETED | OUTPATIENT
Start: 2024-02-28 | End: 2024-02-28

## 2024-02-28 RX ORDER — HEPARIN SODIUM (PORCINE) LOCK FLUSH IV SOLN 100 UNIT/ML 100 UNIT/ML
5 SOLUTION INTRAVENOUS
Status: CANCELLED | OUTPATIENT
Start: 2024-03-11

## 2024-02-28 RX ORDER — METHYLPREDNISOLONE SODIUM SUCCINATE 125 MG/2ML
125 INJECTION, POWDER, LYOPHILIZED, FOR SOLUTION INTRAMUSCULAR; INTRAVENOUS
Status: CANCELLED
Start: 2024-03-11

## 2024-02-28 RX ORDER — ALBUTEROL SULFATE 0.83 MG/ML
2.5 SOLUTION RESPIRATORY (INHALATION)
Status: CANCELLED | OUTPATIENT
Start: 2024-03-11

## 2024-02-28 RX ORDER — ACETAMINOPHEN 325 MG/1
650 TABLET ORAL ONCE
Status: CANCELLED
Start: 2024-03-11

## 2024-02-28 RX ORDER — HEPARIN SODIUM,PORCINE 10 UNIT/ML
5-20 VIAL (ML) INTRAVENOUS DAILY PRN
Status: CANCELLED | OUTPATIENT
Start: 2024-03-11

## 2024-02-28 RX ORDER — METHYLPREDNISOLONE SODIUM SUCCINATE 125 MG/2ML
81.25 INJECTION, POWDER, LYOPHILIZED, FOR SOLUTION INTRAMUSCULAR; INTRAVENOUS ONCE
Status: CANCELLED | OUTPATIENT
Start: 2024-03-11

## 2024-02-28 RX ORDER — DIPHENHYDRAMINE HYDROCHLORIDE 50 MG/ML
50 INJECTION INTRAMUSCULAR; INTRAVENOUS
Status: CANCELLED
Start: 2024-03-11

## 2024-02-28 RX ADMIN — DIPHENHYDRAMINE HYDROCHLORIDE 50 MG: 50 INJECTION, SOLUTION INTRAMUSCULAR; INTRAVENOUS at 09:26

## 2024-02-28 RX ADMIN — RITUXIMAB-ABBS 1000 MG: 10 INJECTION, SOLUTION INTRAVENOUS at 10:03

## 2024-02-28 RX ADMIN — METHYLPREDNISOLONE SODIUM SUCCINATE 81.25 MG: 125 INJECTION, POWDER, FOR SOLUTION INTRAMUSCULAR; INTRAVENOUS at 09:06

## 2024-02-28 RX ADMIN — SODIUM CHLORIDE 250 ML: 9 INJECTION, SOLUTION INTRAVENOUS at 10:03

## 2024-02-28 RX ADMIN — HYDROCORTISONE SODIUM SUCCINATE 100 MG: 100 INJECTION, POWDER, FOR SOLUTION INTRAMUSCULAR; INTRAVENOUS at 12:48

## 2024-02-28 RX ADMIN — ACETAMINOPHEN 650 MG: 325 TABLET, FILM COATED ORAL at 09:06

## 2024-02-28 NOTE — PROGRESS NOTES
Infusion Nursing Note:  Janineyael Whitmoreey presents today for rituximab.    Patient seen by provider today: No   present during visit today: Not Applicable.    Note: Pt tolerated infusion well today. Solu-cortef 100mg given midway through infusion and pt felt this was very beneficial.       Intravenous Access:  Labs drawn without difficulty.  Peripheral IV placed.    Treatment Conditions:  Biological Infusion Checklist:  ~~~ NOTE: If the patient answers yes to any of the questions below, hold the infusion and contact ordering provider or on-call provider.    Have you recently had an elevated temperature, fever, chills, productive cough, coughing for 3 weeks or longer or hemoptysis,  abnormal vital signs, night sweats,  chest pain or have you noticed a decrease in your appetite, unexplained weight loss or fatigue? No  Do you have any open wounds or new incisions? No  Do you have any upcoming hospitalizations or surgeries? Does not include esophagogastroduodenoscopy, colonoscopy, endoscopic retrograde cholangiopancreatography (ERCP), endoscopic ultrasound (EUS), dental procedures or joint aspiration/steroid injections No  Do you currently have any signs of illness or infection or are you on any antibiotics? No  Have you had any new, sudden or worsening abdominal pain? No  Have you or anyone in your household received a live vaccination in the past 4 weeks? Please note: No live vaccines while on biologic/chemotherapy until 6 months after the last treatment. Patient can receive the flu vaccine (shot only), pneumovax and the Covid vaccine. It is optimal for the patient to get these vaccines mid cycle, but they can be given at any time as long as it is not on the day of the infusion. No  Have you recently been diagnosed with any new nervous system diseases (ie. Multiple sclerosis, Guillain [place], seizures, neurological changes) or cancer diagnosis? Are you on any form of radiation or chemotherapy? No  Are you  pregnant or breast feeding or do you have plans of pregnancy in the future? No  Have you been having any signs of worsening depression or suicidal ideations?  (benlysta only) No  Have there been any other new onset medical symptoms? No  Have you had any new blood clots? (IVIG only) No      Post Infusion Assessment:  Patient tolerated infusion without incident.  Site patent and intact, free from redness, edema or discomfort.  No evidence of extravasations.  Access discontinued per protocol.   Biologic Infusion Post Education: Call the triage nurse at your clinic or seek medical attention if you have chills and/or temperature greater than or equal to 100.5, uncontrolled nausea/vomiting, diarrhea, constipation, dizziness, shortness of breath, chest pain, heart palpitations, weakness or any other new or concerning symptoms, questions or concerns.  You cannot have any live virus vaccines prior to or during treatment or up to 6 months post infusion.  If you have an upcoming surgery, medical procedure or dental procedure during treatment, this should be discussed with your ordering physician and your surgeon/dentist.  If you are having any concerning symptom, if you are unsure if you should get your next infusion or wish to speak to a provider before your next infusion, please call your care coordinator or triage nurse at your clinic to notify them so we can adequately serve you.       Discharge Plan:   Discharge instructions reviewed with: Patient.  Patient and/or family verbalized understanding of discharge instructions and all questions answered.  Patient discharged in stable condition accompanied by: self.  Departure Mode: Ambulatory.      Carolina Patino RN

## 2024-02-29 ENCOUNTER — OFFICE VISIT (OUTPATIENT)
Dept: OPHTHALMOLOGY | Facility: CLINIC | Age: 59
End: 2024-02-29
Attending: OPHTHALMOLOGY
Payer: COMMERCIAL

## 2024-02-29 ENCOUNTER — ANCILLARY PROCEDURE (OUTPATIENT)
Dept: MAMMOGRAPHY | Facility: CLINIC | Age: 59
End: 2024-02-29
Payer: COMMERCIAL

## 2024-02-29 DIAGNOSIS — N64.4 BREAST PAIN: ICD-10-CM

## 2024-02-29 DIAGNOSIS — H53.451: ICD-10-CM

## 2024-02-29 DIAGNOSIS — H47.20 OPTIC ATROPHY: Primary | ICD-10-CM

## 2024-02-29 DIAGNOSIS — H04.123 DRY EYE SYNDROME OF BOTH EYES: ICD-10-CM

## 2024-02-29 DIAGNOSIS — H53.40 VISUAL FIELD DEFECT: Primary | ICD-10-CM

## 2024-02-29 LAB — IGE SERPL-ACNC: 13 KU/L (ref 0–114)

## 2024-02-29 PROCEDURE — G0279 TOMOSYNTHESIS, MAMMO: HCPCS | Performed by: RADIOLOGY

## 2024-02-29 PROCEDURE — 92015 DETERMINE REFRACTIVE STATE: CPT

## 2024-02-29 PROCEDURE — 92133 CPTRZD OPH DX IMG PST SGM ON: CPT | Performed by: OPHTHALMOLOGY

## 2024-02-29 PROCEDURE — 77066 DX MAMMO INCL CAD BI: CPT | Performed by: RADIOLOGY

## 2024-02-29 PROCEDURE — G0463 HOSPITAL OUTPT CLINIC VISIT: HCPCS | Performed by: OPHTHALMOLOGY

## 2024-02-29 PROCEDURE — 92014 COMPRE OPH EXAM EST PT 1/>: CPT | Mod: GC | Performed by: OPHTHALMOLOGY

## 2024-02-29 PROCEDURE — 92083 EXTENDED VISUAL FIELD XM: CPT | Performed by: OPHTHALMOLOGY

## 2024-02-29 RX ORDER — CYCLOSPORINE 0.5 MG/ML
EMULSION OPHTHALMIC
Qty: 1 EACH | Refills: 11 | Status: SHIPPED | OUTPATIENT
Start: 2024-02-29

## 2024-02-29 ASSESSMENT — CONF VISUAL FIELD
OS_SUPERIOR_TEMPORAL_RESTRICTION: 0
OS_INFERIOR_TEMPORAL_RESTRICTION: 0
OS_INFERIOR_NASAL_RESTRICTION: 0
OD_SUPERIOR_NASAL_RESTRICTION: 0
OS_SUPERIOR_NASAL_RESTRICTION: 0
METHOD: COUNTING FINGERS
OD_SUPERIOR_TEMPORAL_RESTRICTION: 0
OD_NORMAL: 1
OS_NORMAL: 1
OD_INFERIOR_NASAL_RESTRICTION: 0
OD_INFERIOR_TEMPORAL_RESTRICTION: 0

## 2024-02-29 ASSESSMENT — REFRACTION
OS_SPHERE: -4.25
OS_AXIS: 180
OD_AXIS: 160
OD_SPHERE: -3.50
OS_CYLINDER: +1.00
OD_CYLINDER: +1.50

## 2024-02-29 ASSESSMENT — TONOMETRY
OD_IOP_MMHG: 17
OS_IOP_MMHG: 13
IOP_METHOD: ICARE

## 2024-02-29 ASSESSMENT — VISUAL ACUITY
OS_CC+: -1
OD_CC+: +1
OD_PH_CC: 20/25
OS_CC: 20/20
CORRECTION_TYPE: GLASSES
METHOD: SNELLEN - LINEAR
OD_CC: 20/30

## 2024-02-29 ASSESSMENT — EXTERNAL EXAM - RIGHT EYE: OD_EXAM: NORMAL

## 2024-02-29 ASSESSMENT — CUP TO DISC RATIO
OD_RATIO: 0.1
OS_RATIO: 0.2

## 2024-02-29 ASSESSMENT — REFRACTION_WEARINGRX
OS_SPHERE: -3.50
OD_AXIS: 167
OS_CYLINDER: +1.25
SPECS_TYPE: PAL
OS_AXIS: 002
OD_SPHERE: -3.50
OD_CYLINDER: +1.00

## 2024-02-29 ASSESSMENT — SLIT LAMP EXAM - LIDS
COMMENTS: NORMAL
COMMENTS: NORMAL

## 2024-02-29 ASSESSMENT — EXTERNAL EXAM - LEFT EYE: OS_EXAM: NORMAL

## 2024-02-29 NOTE — NURSING NOTE
Chief Complaint(s) and History of Present Illness(es)       Follow Up    In both eyes.  Charactertized as  blurred vision.  Severity is mild.  Since onset it is stable.  Associated symptoms include eye pain, headache, photophobia and floaters.  Negative for double vision.  Pain was noted as 0/10. 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 Janine reports she can't wear the glasses we gave her.  Her current glasses are from an old Rx, 10 years ago.  She finds that older glasses are usually easier for her to see with. She is sensitive to bright lights.  She has swelling around her cheeks and sometimes there will be upper lid swelling, mainly right-sided.  New bumps around her eyes, can hurt and be bothersome.  Gets headaches a few times per week.  TIFF Mccollum 2/29/2024 1:24

## 2024-02-29 NOTE — PROGRESS NOTES
Assessment & Plan     Janine Cornell is a 58 year old female with the following diagnoses:   1. Optic atrophy    2. 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 She is on HCQ since 5/2014, which was stopped in 6/2021 with concern for OCT changes. On rituximab since 12/20218 with great response. Previously tried and did not tolerate MTX, AZA.    Patient is here for follow up of her idiopathic orbital inflammatory syndrome. Her last visit was 8/22/22 at which point she continued to have pain but CT showed improving inflammation. She was lost to follow up since that time.     Since her last visit, her vision seems stable. She has been diagnosed with Diabetes mellitus and her last HbA1c was in the 8's.      OCT RNFL shows stable superior thinning OD, essentially normal thickness OS. OCT Macula with stable normal architecture. OVF with improved inferonasal depression from 9/2021. She has optic atrophy RIGHT eye secondary to her previous orbital inflammatory syndrome, which remains quiescent today.        There is no Background diabetic retinopathy today. She has stable optic atrophy. She has Dry eye syndrome and will send prescription for restasis to her pharmacy. She is asking for a new prescription today.             Attending Physician Attestation:  Complete documentation of historical and exam elements from today's encounter can be found in the full encounter summary report (not reduplicated in this progress note).  I personally obtained the chief complaint(s) and history of present illness.  I confirmed and edited as necessary the review of systems, past medical/surgical history, family history, social history, and examination findings as documented by others; and I examined the patient myself.  I personally reviewed the relevant tests, images, and reports as documented above.  I formulated and edited as  necessary the assessment and plan and discussed the findings and management plan with the patient and family. I personally reviewed the ophthalmic test(s) associated with this encounter, agree with the interpretation(s) as documented by the resident/fellow, and have edited the corresponding report(s) as necessary.  - Jas Bender MD  Resident Physician, PGY-3  Department of Ophthalmology

## 2024-03-01 ENCOUNTER — PRE VISIT (OUTPATIENT)
Dept: UROLOGY | Facility: CLINIC | Age: 59
End: 2024-03-01
Payer: COMMERCIAL

## 2024-03-05 ENCOUNTER — TELEPHONE (OUTPATIENT)
Dept: RHEUMATOLOGY | Facility: CLINIC | Age: 59
End: 2024-03-05
Payer: COMMERCIAL

## 2024-03-05 NOTE — TELEPHONE ENCOUNTER
Result letter mailed to patient per request of provider.    ISHA PhillipsN, RN  RN Care Coordinator Rheumatology

## 2024-03-11 ENCOUNTER — TELEPHONE (OUTPATIENT)
Dept: PALLIATIVE MEDICINE | Facility: CLINIC | Age: 59
End: 2024-03-11
Payer: COMMERCIAL

## 2024-03-11 NOTE — TELEPHONE ENCOUNTER
"[facility] Call Center    Phone Message    May a detailed message be left on voicemail: No    Reason for Call: Other: Pt called stating she wanted to cancel tomorrows appt which is with Urology not Pain, writer tried to tell her that her appt with Linda was on Wednesday this week not tomorrow-Tuesday. Pt proceeded to get loud and rude and asked \"why am I seeing a pain doctor? I'm suppose to be seeing a spine doctor?!\" Writer explained that Linda is the spine doctor she just happens to be through the pain clinic. Pt asked how long urology appt was, then proceeded to get mad stating that writer is confused today when indeed, Pt asked how long TOMORROW's appt with Urology is NOT the one on Wednesday. Pt seemed confused and was rude.      Action Taken: Message routed to:  [facility] [place] [facility]    Travel Screening: Not Applicable                                                                   "

## 2024-03-11 NOTE — CONFIDENTIAL NOTE
RN returned call to patient to discuss any appointment concerns with dates and times. Patient stated it has all been sorted out and that there was confusion when she contacted the call center as she thought she was contacting Urology. Patient is aware her appointment with Dr. Fuchs is on 3/13 at 12:15 pm. No further questions or concerns at this time. Patient appreciated the call.     Desiree Sepulveda RN

## 2024-03-13 ENCOUNTER — OFFICE VISIT (OUTPATIENT)
Dept: ANESTHESIOLOGY | Facility: CLINIC | Age: 59
End: 2024-03-13
Attending: PODIATRIST
Payer: COMMERCIAL

## 2024-03-13 ENCOUNTER — TELEPHONE (OUTPATIENT)
Dept: GASTROENTEROLOGY | Facility: CLINIC | Age: 59
End: 2024-03-13

## 2024-03-13 ENCOUNTER — VIRTUAL VISIT (OUTPATIENT)
Dept: GASTROENTEROLOGY | Facility: CLINIC | Age: 59
End: 2024-03-13
Payer: COMMERCIAL

## 2024-03-13 VITALS
DIASTOLIC BLOOD PRESSURE: 77 MMHG | SYSTOLIC BLOOD PRESSURE: 121 MMHG | OXYGEN SATURATION: 99 % | BODY MASS INDEX: 29.77 KG/M2 | HEIGHT: 63 IN | HEART RATE: 71 BPM | WEIGHT: 168 LBS

## 2024-03-13 DIAGNOSIS — R11.2 NAUSEA AND VOMITING, UNSPECIFIED VOMITING TYPE: ICD-10-CM

## 2024-03-13 DIAGNOSIS — K86.1 CHRONIC PANCREATITIS, UNSPECIFIED PANCREATITIS TYPE (H): ICD-10-CM

## 2024-03-13 DIAGNOSIS — M54.17 LUMBOSACRAL RADICULOPATHY: ICD-10-CM

## 2024-03-13 DIAGNOSIS — R79.89 HIGH SERUM VITAMIN D: ICD-10-CM

## 2024-03-13 DIAGNOSIS — R68.81 EARLY SATIETY: ICD-10-CM

## 2024-03-13 DIAGNOSIS — K86.89 PANCREATIC MASS: Primary | ICD-10-CM

## 2024-03-13 DIAGNOSIS — K76.0 HEPATIC STEATOSIS: ICD-10-CM

## 2024-03-13 PROCEDURE — 99417 PROLNG OP E/M EACH 15 MIN: CPT | Mod: 95 | Performed by: PHYSICIAN ASSISTANT

## 2024-03-13 PROCEDURE — 99215 OFFICE O/P EST HI 40 MIN: CPT | Mod: 95 | Performed by: PHYSICIAN ASSISTANT

## 2024-03-13 PROCEDURE — 99203 OFFICE O/P NEW LOW 30 MIN: CPT | Performed by: ANESTHESIOLOGY

## 2024-03-13 ASSESSMENT — PAIN SCALES - GENERAL: PAINLEVEL: MODERATE PAIN (5)

## 2024-03-13 NOTE — PROGRESS NOTES
"                      [facility] Consultation    Date of visit: 3/13/2024    Reason for consultation:    Janine Cornell is a 58 year old female who is seen in consultation today at the request of her provider, Rosendo Cruz.    Primary Care Provider is Kash Solano.  Pain medications are being prescribed by PCP.    Please see the [facility] health questionnaire which the patient completed and reviewed with me in detail.    Chief Complaint:    Chief Complaint   Patient presents with    Consult     Consult Fibromyalgia - multi sites Feet-Knee pain       Pain history:  Janine Cornell is a 58 year old female who first started having problems with pain in several locations but here to discuss the low back and leg pain. She describes this as feeling that the torso is pressing into both legs. She also has history of diabetes. She states she has had some neuropathy in the feet but it has come and gone. Now the pain has been present and consistent. She has numbness in bilateral feet from toes to midfoot.  She states that she also has sciatica on both sides, radiating down the back of both legs all the way down to the back/bottom of the feet.     \"I have pain in some part of my body at all times of the day\"  Many other medical problems including severe pancreatitis, diabetes, NSTEMI    Pain rating: intensity ranges from 3/10 to 10/10, and Averages 6/10 on a 0-10 scale.  Aggravating factors include: standing for long period of time such as when preparing foods, but \"it all depends\"  Relieving factors include: pain medication, self-soothing  Any bowel or bladder incontinence: none    Current treatments include:  Acetaminophen  Pregabalin 25 mg once daily  Tramadol 50 mg       Other treatments have included:  Janine Cornell has been seen at a pain clinic in the past.    PT: no, not interested  Acupuncture: already has a referral, hasn't found the right provider  TENs Unit: " no  Injections: no    Past Medical History:  Past Medical History:   Diagnosis Date    Abnormal glandular Papanicolaou smear of cervix 1992    Allergic rhinitis     Allergy, airborne subst    Arthritis     ASCVD (arteriosclerotic cardiovascular disease)     Chronic pain     Chronic pancreatitis (H)     idiopathic, Tx: PPI, antioxidants, pancreatic enzymes    Common migraine     Coronary artery disease     Costochondritis     Difficulty in walking(719.7)     Dyspnea on exertion     Ectasia, mammary duct     [facility], persistent nipple discharge    Elevated fasting glucose     Gastro-oesophageal reflux disease     Granulomatosis with polyangiitis (H)     Hernia     History of angina     Hyperlipidemia LDL goal < 100     Hypertension goal BP (blood pressure) < 140/90     Essential hypertension    Iron deficiency anemia     Ischemic cardiomyopathy     Menorrhagia     Migraine headaches     Mild persistent asthma     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 Matias, neg w/u, Rx'd w/prednisone    Ventral hernia, unspecified, without mention of obstruction or gangrene 07/2012     Patient Active Problem List    Diagnosis Date Noted    Urinary frequency 05/25/2021     Priority: Medium    Abdominal pain, epigastric 05/25/2021     Priority: Medium    Hypokalemia  05/25/2021     Priority: Medium    Leukocytosis, unspecified type 05/25/2021     Priority: Medium    Acute pancreatitis, unspecified complication status, unspecified pancreatitis type 05/25/2021     Priority: Medium    Chest discomfort 02/06/2020     Priority: Medium     Added automatically from request for surgery [id number]      Type 1 diabetes mellitus with complications (H) 07/11/2019     Priority: Medium    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     Priority: Medium    CAD S/P percutaneous coronary angioplasty 02/09/2016     Priority: Medium    Status post coronary angiogram 02/09/2016     Priority: Medium    Telogen effluvium 11/17/2015     Priority: Medium    Tobacco use disorder 10/29/2015     Priority: Medium    Overweight with body mass index (BMI) 25.0-29.9 10/20/2015     Priority: Medium    Type 2 diabetes mellitus with other specified complication (H) 10/19/2015     Priority: Medium    Hyperlipidemia associated with type 2 diabetes mellitus (H) 10/19/2015     Priority: Medium    Hypertension associated with diabetes (H) 10/19/2015     Priority: Medium    Type 2 diabetes, HbA1C goal < 8% (H) 07/17/2014     Priority: Medium     Goal < 8 based on ASCVD/accord study      Seronegative rheumatoid arthritis (H) 01/24/2014     Priority: Medium    Fibromyalgia 10/20/2013     Priority: Medium    Spinal stenosis in cervical region 08/19/2013     Priority: Medium    Breast cancer screening 07/31/2013     Priority: Medium    Vitamin D deficiency 11/30/2012     Priority: Medium    Costochondritis      Priority: Medium     1/12      Uterine leiomyoma 06/08/2012     Priority: Medium     (Problem list name updated by automated process. Provider to review and confirm.)      Iron deficiency anemia 06/08/2012     Priority: Medium    Ischemic cardiomyopathy 02/02/2012     Priority:  Medium     EF 35-40% 10/31/11 with acute NSTEMI.  Persistent mid septal and basal inferior hypokinesis with normal LV EF on f/u echo 1/20/12.      Hypertensive heart disease 02/02/2012     Priority: Medium    GERD (gastroesophageal reflux disease) 11/09/2011     Priority: Medium    NSTEMI (non-ST elevated myocardial infarction) (H) 11/01/2011     Priority: Medium     Jovan-septal MI      Hyperlipidemia LDL goal <70 11/01/2011     Priority: Medium     -Lipids 11/1/11:  , , , HDL 32      ASCVD (arteriosclerotic cardiovascular disease) 11/01/2011     Priority: Medium     PCI with stenting of LAD, D2, and prox RCA 11/2/11.   (Right dominant system with no left main dz.)      Common migraine 06/01/2011     Priority: Medium     (Problem list name updated by automated process. Provider to review and confirm.)      Hypertension goal BP (blood pressure) < 140/90      Priority: Medium    Chronic pancreatitis (H)      Priority: Medium     10/08:  idiopathic, Dr. Hidalgo, Dr. Marcus, Dr. Preston Pastrana. Tx: PPI, antioxidants, pancreatic enzymes.    Has had an extensive workup of her chronic postprandial abdominal pain, bloating, and early satiety including gastric emptying study (3/09) and MRI/MRCP (3/09) in addition to EGD (10/08) and endoscopic ultrasound (11/08).  MRCP was suggestive of idiopathic chronic pancreatitis.   I do have records from Dr. Marcus at [facility].  She was referred to him by Dr. Hidalgo and Dr. Pastrana (though I don't have their records).  She was prescribed antioxidant vitamins and pancreatic enzymes as well as her PPI.  She states she had an allergic reaction to the enzymes (creon) which she attributes to the fact that there is pork in the capsules.  She is frustrated that no one has cured her of her pain.        PCOS (polycystic ovarian syndrome)      Priority: Medium    Moderate persistent asthma      Priority: Medium    Seasonal affective disorder (H24)      Priority: Medium     SAD       Allergic rhinitis      Priority: Medium     Allergy, airborne subst         Past Surgical History:  Past Surgical History:   Procedure Laterality Date    C/SECTION, LOW TRANSVERSE  1996        CARDIAC SURGERY      cardiac stent- recent in 16; 4 other stents    COLONOSCOPY N/A 2023    Procedure: COLONOSCOPY, WITH POLYPECTOMY;  Surgeon: Percy Gross MD;  Location: [facility] OR    DILATION AND CURETTAGE N/A 2016    Procedure: DILATION AND CURETTAGE;  Surgeon: Nahed Butler MD;  [facility]    ENDOMETRIAL SAMPLING (BIOPSY) N/A 2023    Procedure: ENDOMETRIAL BIOPSY;  Surgeon: Nahed Butler MD;  [facility]    ESOPHAGOSCOPY, GASTROSCOPY, DUODENOSCOPY (EGD), COMBINED N/A 2022    Procedure: ESOPHAGOGASTRODUODENOSCOPY, WITH BIOPSY;  Surgeon: Enzo Sesay MD;  [facility]    ESOPHAGOSCOPY, GASTROSCOPY, DUODENOSCOPY (EGD), COMBINED N/A 2023    Procedure: ESOPHAGOGASTRODUODENOSCOPY, WITH BIOPSY;  Surgeon: Percy Gross MD;  Location: [facility] OR    EXAM UNDER ANESTHESIA PELVIC N/A 2023    Procedure: EXAM UNDER ANESTHESIA;  Surgeon: Nahed Butler MD;  [facility]    HC UGI ENDOSCOPY W EUS  2008    Dr. Pastrana, possible chronic pancreatitis, fatty liver    HERNIA REPAIR  2012    done at [facility]    INSERT INTRAUTERINE DEVICE N/A 2016    Procedure: INSERT INTRAUTERINE DEVICE;  Surgeon: Nahed Butler MD;  [facility]    INT UTERINE FIBRIOD EMBOLIZATION  10/29/2014    LAPAROSCOPIC CHOLECYSTECTOMY  2008    Dr. Arnol GRUBBS TX, CERVICAL  1992    s/p LEEP, done at [facility] in [place].    ORBITOTOMY Right 03/15/2016    Procedure: ORBITOTOMY;  Surgeon: Myron Cyr MD;  Location: [facility]    ORBITOTOMY Right 2017    Procedure: ORBITOTOMY;  RIGHT ORBITOTOMY AND BIOPSY;  Surgeon: Charis Holbrook MD;  Location: [facility]    REMOVE INTRAUTERINE DEVICE N/A 2023     Procedure: Remove intrauterine device,;  Surgeon: Nahed Butler MD;  [facility]    REPAIR PTOSIS Right 11/17/2017    Procedure: REPAIR PTOSIS;  RIGHT UPPER LID PTOSIS REPAIR;  Surgeon: Myron Cyr MD;  Location: [facility]    UPPER GI ENDOSCOPY  10/21/2008    mild gastritis, Dr. Rocky JACKSONALCON ECHO HEART XTHORACIC,COMPLETE, W/O DOPPLER  02/04/2004    [facility] WNL, EF 60%     Medications:  Current Outpatient Medications   Medication Sig Dispense Refill    acetaminophen (TYLENOL) 325 MG tablet Take 1-2 tablets (325-650 mg) by mouth every 6 hours as needed for pain (headache) 250 tablet 4    ADVAIR DISKUS 250-50 MCG/ACT inhaler Inhale 1 puff into the lungs 2 times daily      albuterol (2.5 MG/3ML) 0.083% neb solution INL 1 VIAL VIA NEBULIZATION Q 4 TO 6 HOURS PRN  1    albuterol (PROAIR HFA, PROVENTIL HFA, VENTOLIN HFA) 108 (90 BASE) MCG/ACT inhaler Inhale 2 puffs into the lungs every 6 hours as needed for shortness of breath / dyspnea or wheezing 3 Inhaler 1    BANOPHEN 25 MG tablet Take 25 mg by mouth At Bedtime      blood glucose (NO BRAND SPECIFIED) lancets standard Use to test blood sugar 1-4 times daily or as directed. 400 each 3    blood glucose (NO BRAND SPECIFIED) test strip Use to test blood sugar fasting each am and predinner daily. 250 strip 3    blood glucose monitoring (NO BRAND SPECIFIED) meter device kit Use to test blood sugar 2 times daily or as directed. 1 kit 0    Blood Pressure Monitor KIT 1 each daily Monitor home blood pressure as instructed by physician.  Dispense [place] blood pressure kit. 1 kit 0    calcium carbonate (TUMS) 500 MG chewable tablet Take 3-4 tablets (1,500-2,000 mg) by mouth daily as needed 90 tablet 3    clopidogrel (PLAVIX) 75 MG tablet Take 1 tablet (75 mg) by mouth daily . 90 tablet 3    clotrimazole (MYCELEX) 10 MG lozenge Place 1 lozenge (10 mg) inside cheek 5 times daily 50 lozenge 1    cyclobenzaprine (FLEXERIL) 10 MG tablet Take 1 tablet (10 mg) by  mouth 2 times daily as needed for muscle spasms 60 tablet 3    cycloSPORINE (RESTASIS) 0.05 % ophthalmic emulsion 1 month supply 11 refills 1 each 11    dicyclomine (BENTYL) 20 MG tablet TAKE 1 TABLET(20 MG) BY MOUTH FOUR TIMES DAILY AS NEEDED. 60 tablet 4    empagliflozin (JARDIANCE) 10 MG TABS tablet Take 1 tablet (10 mg) by mouth daily 30 tablet 3    EPIPEN 2-RIKY 0.3 MG/0.3ML injection INJECT 0.3 MG INTO THE MUSCLE PRF ANAPHYALAXIS  0    esomeprazole (NEXIUM) 40 MG DR capsule Take 1 capsule (40 mg) by mouth 2 times daily Take 30-60 minutes before eating. 180 capsule 0    estradiol (VAGIFEM) 10 MCG TABS vaginal tablet Place 1 tablet (10 mcg) vaginally twice a week 24 tablet 3    fluticasone (FLONASE) 50 MCG/ACT nasal spray Spray 1 spray in nostril as needed      folic acid (FOLVITE) 1 MG tablet Take 1 tablet (1 mg) by mouth daily 90 tablet 0    hydrocortisone (CORTAID) 1 % external cream Apply topically 2 times daily (Patient taking differently: Apply topically as needed) 60 g 1    insulin glargine (LANTUS PEN) 100 UNIT/ML pen Inject 55 Units Subcutaneous every morning Or as directed. (Patient taking differently: Inject 58 Units Subcutaneous every morning Or as directed.) 75 mL 3    insulin pen needle (BD PEN NEEDLE MARCK 2ND GEN) 32G X 4 MM miscellaneous Use one pen needle daily or as directed. 100 each 3    ketoconazole (NIZORAL) 2 % shampoo Apply topically daily as needed      levocetirizine (XYZAL) 5 MG tablet Take 5 mg by mouth as needed      lisinopril-hydrochlorothiazide (ZESTORETIC) 20-25 MG tablet Take 1 tablet by mouth daily 90 tablet 3    LORazepam (ATIVAN) 0.5 MG tablet Take 1 tablet 30-60 minutes before your scheduled MRI 1 tablet 0    magnesium 250 MG tablet TAKE 1 TABLET(250 MG) BY MOUTH TWICE DAILY 60 tablet 3    metFORMIN (GLUCOPHAGE XR) 500 MG 24 hr tablet Take 4 tablets (2,000 mg) by mouth daily (with dinner) 360 tablet 3    metoprolol succinate ER (TOPROL XL) 100 MG 24 hr tablet Take 1 tablet  (100 mg) by mouth daily 90 tablet 3    Multiple Vitamin (TAB-A-GERALD) TABS Take 1 tablet by mouth daily 90 tablet 1    nitroGLYcerin (NITROSTAT) 0.4 MG sublingual tablet For chest pain place 1 tablet under the tongue every 5 minutes for 3 doses. If symptoms persist 5 minutes after 1st dose call 911. 30 tablet 11    nitroGLYcerin (NITROSTAT) 0.4 MG sublingual tablet Place 1 tablet (0.4 mg) under the tongue every 5 minutes as needed for chest pain . After 3 doses, if pain persists call 911. 25 tablet 3    olopatadine (PATANOL) 0.1 % ophthalmic solution Place 1 drop into both eyes as needed      ondansetron (ZOFRAN ODT) 8 MG ODT tab Take 1 tablet (8 mg) by mouth every 8 hours as needed for nausea 20 tablet 1    pravastatin (PRAVACHOL) 40 MG tablet Take 1 tablet (40 mg) by mouth daily 90 tablet 3    pregabalin (LYRICA) 25 MG capsule Take one po qd 30 capsule 1    prochlorperazine (COMPAZINE) 5 MG tablet Take 1 tablet (5 mg) by mouth every 6 hours as needed for nausea or vomiting 60 tablet 3    sennosides (SENOKOT) 8.6 MG tablet 1-2 tabs a day as needed for constipation 60 tablet 1    spironolactone (ALDACTONE) 25 MG tablet Take 1 tablet (25 mg) by mouth daily. May also take 0.5 tablets (12.5 mg) daily as needed (for weight gain). 45 tablet 9    sucralfate (CARAFATE) 1 GM tablet Take 1 tablet (1 g) by mouth 4 times daily 120 tablet 3    terbinafine (LAMISIL) 1 % external cream Apply topically 2 times daily (Patient taking differently: Apply topically as needed) 42 g 1    tiZANidine (ZANAFLEX) 4 MG tablet Take 1 tablet (4 mg) by mouth 3 times daily as needed for muscle spasms 21 tablet 3    traMADol (ULTRAM) 50 MG tablet TAKE 1 TABLET(50 MG) BY MOUTH EVERY 8 HOURS AS NEEDED FOR SEVERE PAIN 60 tablet 3    vitamin D3 (CHOLECALCIFEROL) 50 mcg (2000 units) tablet Take 1 tablet (50 mcg) by mouth daily 90 tablet 1    vitamin D3 (CHOLECALCIFEROL) 50 mcg (2000 units) tablet Take 1 tablet (50 mcg) by mouth daily 90 tablet 0      Allergies:     Allergies   Allergen Reactions    Amoxicillin-Pot Clavulanate      Unknown possible hives and edema    Amoxicillin-Pot Clavulanate     Artificial Sweetner (Informational Only)  Other (See Comments)     Increased headache    Azithromycin     Clavulanic Acid     Diatrizoate Other (See Comments)     Pt wants this listed because she is allergic to shellfish     Imitrex [Triptans]      Severe face/neck/chest tightness and flushing side effects     Penicillins Hives     Unknown     Pork Allergy      Stomach pain, cramping, diarrhea, itching, nausea and headaches    Shellfish Allergy Hives and Swelling    Shellfish-Derived Products     Sulfa Antibiotics Hives and Swelling    Sulfatolamide     Zithromax [Azithromycin Dihydrate] Swelling     Unknown      Social History:  Home situation:   Occupation/Schooling:   Tobacco use:   Alcohol use:   Drug use:   History of chemical dependency treatment:     Family history:  Family History   Problem Relation Age of Onset    Hypertension Mother     Arthritis Mother     Heart Disease Mother         long qt syndrome    Thyroid Disease Mother     C.A.D. Mother     Heart Disease Father 50        heart attack    Cerebrovascular Disease Father     Diabetes Father     Hypertension Father     Depression Father     C.A.D. Father     Neurologic Disorder Sister         migraines    Neurologic Disorder Sister         migraines    Heart Disease Brother 15        MI at 15, 16.     Arthritis Brother         he passed away, had severe arthritis at age 11    C.A.D. Brother     Anesthesia Reaction Son         PONV    Respiratory Son         asthma    Hypertension Maternal Aunt     Diabetes Maternal Uncle     Hypertension Maternal Uncle     Arthritis Maternal Uncle     Eye Disorder Maternal Uncle         cataracts    Cerebrovascular Disease Maternal Uncle     Family History Negative Other         neg for RA, SLE    Unknown/Adopted No family hx of         unknown neurological  "issues in her family, mother was adopted    Skin Cancer No family hx of         No known family hx of skin cancer    Deep Vein Thrombosis (DVT) No family hx of          Review of Systems:    POSTIVE IN BOLD  GENERAL: fever/chills, fatigue, general unwell feeling, weight gain/loss.  HEAD/EYES:  headache, dizziness, or vision changes.    EARS/NOSE/THROAT:  Nosebleeds, hearing loss, sinus infection, earache, tinnitus.  IMMUNE:  Allergies, cancer, immune deficiency, or infections.  SKIN:  Urticaria, rash, hives  HEME/Lymphatic:   anemia, easy bruising, easy bleeding.  RESPIRATORY:  cough, wheezing, or shortness of breath  CARDIOVASCULAR/Circulation:  Extremity edema, syncope, hypertension, tachycardia, or angina.  GASTROINTESTINAL:  abdominal pain, nausea/emesis, diarrhea, constipation,  hematochezia, or melena.  ENDOCRINE:  Diabetes, steroid use,  thyroid disease or osteoporosis.  MUSCULOSKELETAL: neck pain, back pain, arthralgia, arthritis, or gout.  GENITOURINARY:  frequency, urgency, dysuria, difficulty voiding, hematuria or incontinence.  NEUROLOGIC:  weakness, numbness, paresthesias, seizure, tremor, stroke or memory loss.  PSYCHIATRIC:  depression, anxiety, stress, suicidal thoughts or mood swings.     Physical Exam:  Vitals:    03/13/24 1213   BP: 121/77   BP Location: Right arm   Patient Position: Chair   Cuff Size: Adult Large   Pulse: 71   SpO2: 99%   Weight: 76.2 kg (168 lb)   Height: 1.6 m (5' 3\")     Exam:  Constitutional: healthy, alert, and no distress  Head: normocephalic. Atraumatic.   Eyes: no redness or jaundice noted   ENT: oropharnx normal.  MMM.  Neck supple.    Respiratory: clear   : deferred  Skin: no suspicious lesions or rashes  Psychiatric: mentation appears normal and affect normal/bright    Musculoskeletal exam:  Gait/Station/Posture: normal      Lumbar spine: WNL ROM with f/e/bl rotation       Myofascial tenderness:  positive paraspinal bilaterally  Straight leg exam: negative  Rudy's " maneuver: negative    Neurologic exam:  CN:  Cranial nerves 2-12 are normal  Motor:  5/5 UE and LE strength  Sensory:  (upper and lower extremities):   Light touch: normal    Allodynia: absent    Dysethesia: absent    Hyperalgesia: absent     Diagnostic tests:  No pertinent imaging available - will discuss attaining XR lumbar spine    Other testing (labs, diagnostics) reviewed:  Labs  Lab Results   Component Value Date    A1C 8.4 03/15/2024    A1C 9.5 06/21/2023    A1C 8.5 11/23/2022    A1C 10.3 08/19/2022    A1C 11.2 05/14/2022    A1C 8.0 06/18/2020    A1C 9.2 03/06/2019    A1C 9.6 11/09/2018    A1C 8.4 11/15/2017    A1C 8.8 06/28/2017     Last Comprehensive Metabolic Panel:  Lab Results   Component Value Date     12/14/2023    POTASSIUM 4.0 12/14/2023    CHLORIDE 101 12/14/2023    CO2 26 12/14/2023    ANIONGAP 12 12/14/2023     (H) 12/14/2023    BUN 18.1 12/14/2023    CR 1.07 (H) 02/12/2024    GFRESTIMATED 60 (L) 02/12/2024    KATHY 9.5 12/14/2023         [place] Prescription Monitoring Program reviewed    Outside records reviewed      Assessment:  Fibromyalgia  Chronic Pain Syndrome  Chronic low back pain     Janine Cornell is a 58 year old female who presents with the complaints of chronic pain throughout multiple foci throughout her body, most consistent with myofascial pain. She has been evaluated by other specialties but has not had any imaging. We discussed having her attain XR of lumbar spine and then we can discuss after that. She is not interested in other conservative therapies that I suggested, including PT    Plan:  Diagnosis reviewed, treatment option addressed, and risk/benefits discussed.  Self-care instructions given.  I am recommending a multidisciplinary treatment plan to help this patient better manage her pain.      Physical Therapy: patient deferred  Pain Psychologist to address issues of relaxation, behavioral change, coping style, and other factors important to improvement: patient  deferred  Diagnostic Studies: XR Lumbar spine  Medication Management: no changes  Further procedures recommended: not at this time, will evaluate only if A1c is appropriate    Follow up: after XR Lumbar spine    Total time spent was 40 minutes, and more than 50% of face to face time was spent in counseling and/or coordination of care regarding principles of multidisciplinary care, medication management, and therapeutic options.     Linda Fuchs MD    Pain Medicine  Department of Anesthesiology  [facility]

## 2024-03-13 NOTE — PATIENT INSTRUCTIONS
Imaging:    Lumbar Spine Xray ordered.     IMAGING SERVICES HOURS:    All imaging modalities are available from 7 a.m. - 9 p.m. Monday through Friday  X-ray, CT, MRI, and General Ultrasound appointments are available from 7 a.m. -3:30 p.m. on Saturdays  X-ray, CT and MRI appointments are available from 8 a.m. - 4:30 p.m. on Sundays  Please call [phone] to schedule imaging exams       Recommended Follow up:       Follow up at the Xray is completed.       Please call [phone] to schedule your clinic appointment if you don't already have an appointment scheduled.        To speak with a nurse, schedule/reschedule/cancel a clinic appointment, or request a medication refill call: [phone]    You can also reach us by [employer]: [email or web address]

## 2024-03-13 NOTE — PATIENT INSTRUCTIONS
It was a pleasure taking care of you today.  I've included a brief summary of our discussion and care plan from today's visit below.  Please review this information with your primary care provider.  _______________________________________________________________________     My recommendations are summarized as follows:     -- schedule an endoscopic ultrasound to further assess your pancreas     -- referral to medication therapy management pharmacist to review your meds     -- recheck your vitamin D level and check B12 and folate     -- Follow up after work up completed    ______________________________________________________________________     How do I schedule labs, imaging studies, or procedures that were ordered in clinic today?      Labs: To schedule lab appointment you can contact your local [facility] or call [phone] to schedule at any convenient [facility] location.     Procedures: If a colonoscopy, upper endoscopy, breath test, esophageal manometry, or pH impedence was ordered today, our endoscopy team will call you to schedule this. If you have not heard from our endoscopy team within a week, please call [phone] to schedule.      Imaging Studies: If you were scheduled for a CT scan, X-ray, MRI, ultrasound, HIDA scan or other imaging study, please call [phone] to have this scheduled.      Referral: If a referral to another specialty was ordered, expect a phone call or follow instructions above. If you have not heard from anyone regarding your referral in a week, please call our clinic to check the status.      Who do I call with any questions after my visit?  Please be in touch if there are any further questions that arise following today's visit.  There are multiple ways to contact your gastroenterology care team.       During business hours, you may reach a Gastroenterology nurse at [phone]     To schedule or reschedule an appointment, please call [phone].       You can always send a secure message through [employer].  [employer] messages are answered by your nurse or doctor typically within 24 hours.  Please allow extra time on weekends and holidays.       For urgent/emergent questions after business hours, you may reach the on-call GI Fellow by contacting the [facility]  at [phone].     How will I get the results of any tests ordered?    You will receive all of your results.  If you have signed up for [employer], any tests ordered at your visit will be available to you after your physician reviews them.  Typically this takes 1-2 weeks.  If there are urgent results that require a change in your care plan, your physician or nurse will call you to discuss the next steps.       What is [employer]?  [employer] is a secure way for you to access all of your healthcare records from the [facility].  It is a web based computer program, so you can sign on to it from any location.  It also allows you to send secure messages to your care team.  I recommend signing up for [employer] access if you have not already done so and are comfortable with using a computer.       How to I schedule a follow-up visit?  If you did not schedule a follow-up visit today, please call [phone] to schedule a follow-up office visit.      Benjamin Hyman PA-C  Division of Gastroenterology, Hepatology and Nutrition   [facility]

## 2024-03-13 NOTE — NURSING NOTE
Patient presents with:  Consult: Consult Fibromyalgia - multi sites Feet-Knee pain      Moderate Pain (5)     Pain Medications       Analgesics Other Refills Start End     acetaminophen (TYLENOL) 325 MG tablet 4 1/21/2021 --    Sig - Route: Take 1-2 tablets (325-650 mg) by mouth every 6 hours as needed for pain (headache) - Oral    Class: E-Prescribe       Opioid Agonists Refills Start End     traMADol (ULTRAM) 50 MG tablet 3 12/9/2023 --    Sig: TAKE 1 TABLET(50 MG) BY MOUTH EVERY 8 HOURS AS NEEDED FOR SEVERE PAIN    Class: E-Prescribe    Prior authorization: Closed            What medications are you using for pain? Tylenol, Tramadol    (New patients only) Have you been seen by another pain clinic/ provider? yes    (Return Patients only) What refills are you needing today? No    Expectation

## 2024-03-13 NOTE — LETTER
"3/13/2024       RE: Janine Cornell  [address]  [facility] [address]       Dear Colleague,    Thank you for referring your patient, Janine Cornell, to the [facility]NA[facility] [place] at [facility]. Please see a copy of my visit note below.                          Health system [facility] Consultation    Date of visit: 3/13/2024    Reason for consultation:    Janine Cornell is a 58 year old female who is seen in consultation today at the request of her provider, Rosendo Cruz.    Primary Care Provider is Kash Solano  Pain medications are being prescribed by PCP.    Please see the [facility] Pain [facility] health questionnaire which the patient completed and reviewed with me in detail.    Chief Complaint:    Chief Complaint   Patient presents with    Consult     Consult Fibromyalgia - multi sites Feet-Knee pain       Pain history:  Janine Cornell is a 58 year old female who first started having problems with pain in several locations but here to discuss the low back and leg pain. She describes this as feeling that the torso is pressing into both legs. She also has history of diabetes. She states she has had some neuropathy in the feet but it has come and gone. Now the pain has been present and consistent. She has numbness in bilateral feet from toes to midfoot.  She states that she also has sciatica on both sides, radiating down the back of both legs all the way down to the back/bottom of the feet.     \"I have pain in some part of my body at all times of the day\"  Many other medical problems including severe pancreatitis, diabetes, NSTEMI    Pain rating: intensity ranges from 3/10 to 10/10, and Averages 6/10 on a 0-10 scale.  Aggravating factors include: standing for long period of time such as when preparing foods, but \"it all depends\"  Relieving factors include: pain medication, self-soothing  Any " bowel or bladder incontinence: none    Current treatments include:  Acetaminophen  Pregabalin 25 mg once daily  Tramadol 50 mg       Other treatments have included:  Janine Cornell has been seen at a pain clinic in the past.    PT: no, not interested  Acupuncture: already has a referral, hasn't found the right provider  TENs Unit: no  Injections: no    Past Medical History:  Past Medical History:   Diagnosis Date    Abnormal glandular Papanicolaou smear of cervix 1992    Allergic rhinitis     Allergy, airborne subst    Arthritis     ASCVD (arteriosclerotic cardiovascular disease)     Chronic pain     Chronic pancreatitis (H)     idiopathic, Tx: PPI, antioxidants, pancreatic enzymes    Common migraine     Coronary artery disease     Costochondritis     Difficulty in walking(719.7)     Dyspnea on exertion     Ectasia, mammary duct     [facility], persistent nipple discharge    Elevated fasting glucose     Gastro-oesophageal reflux disease     Granulomatosis with polyangiitis (H)     Hernia     History of angina     Hyperlipidemia LDL goal < 100     Hypertension goal BP (blood pressure) < 140/90     Essential hypertension    Iron deficiency anemia     Ischemic cardiomyopathy     Menorrhagia     Migraine headaches     Mild persistent asthma     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 Matias, neg w/u, Rx'd w/prednisone    Ventral hernia, unspecified, without mention of obstruction or gangrene 07/2012     Patient Active Problem List    Diagnosis Date Noted    Urinary frequency 05/25/2021     Priority: Medium    Abdominal pain, epigastric 05/25/2021     Priority: Medium    Hypokalemia 05/25/2021     Priority: Medium    Leukocytosis, unspecified type 05/25/2021     Priority: Medium    Acute pancreatitis, unspecified complication status, unspecified pancreatitis type 05/25/2021     Priority: Medium    Chest discomfort 02/06/2020     Priority: Medium     Added automatically from request for surgery [id number]      Type 1 diabetes mellitus with complications (H) 07/11/2019     Priority: Medium    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     Priority: Medium    CAD S/P percutaneous coronary angioplasty 02/09/2016     Priority: Medium    Status post coronary angiogram 02/09/2016     Priority: Medium    Telogen effluvium 11/17/2015     Priority: Medium    Tobacco use disorder 10/29/2015     Priority: Medium    Overweight with body mass index (BMI) 25.0-29.9 10/20/2015     Priority: Medium    Type 2 diabetes mellitus with other specified complication (H) 10/19/2015     Priority: Medium    Hyperlipidemia associated with type 2 diabetes mellitus (H) 10/19/2015     Priority: Medium    Hypertension associated with diabetes (H) 10/19/2015     Priority: Medium    Type 2 diabetes, HbA1C goal < 8% (H) 07/17/2014     Priority: Medium     Goal < 8 based on ASCVD/accord study      Seronegative rheumatoid arthritis (H) 01/24/2014     Priority: Medium    Fibromyalgia 10/20/2013     Priority: Medium    Spinal stenosis in cervical region 08/19/2013     Priority: Medium    Breast cancer screening 07/31/2013     Priority: Medium    Vitamin D deficiency 11/30/2012      Priority: Medium    Costochondritis      Priority: Medium     1/12      Uterine leiomyoma 06/08/2012     Priority: Medium     (Problem list name updated by automated process. Provider to review and confirm.)      Iron deficiency anemia 06/08/2012     Priority: Medium    Ischemic cardiomyopathy 02/02/2012     Priority: Medium     EF 35-40% 10/31/11 with acute NSTEMI.  Persistent mid septal and basal inferior hypokinesis with normal LV EF on f/u echo 1/20/12.      Hypertensive heart disease 02/02/2012     Priority: Medium    GERD (gastroesophageal reflux disease) 11/09/2011     Priority: Medium    NSTEMI (non-ST elevated myocardial infarction) (H) 11/01/2011     Priority: Medium     Jovan-septal MI      Hyperlipidemia LDL goal <70 11/01/2011     Priority: Medium     -Lipids 11/1/11:  , , , HDL 32      ASCVD (arteriosclerotic cardiovascular disease) 11/01/2011     Priority: Medium     PCI with stenting of LAD, D2, and prox RCA 11/2/11.   (Right dominant system with no left main dz.)      Common migraine 06/01/2011     Priority: Medium     (Problem list name updated by automated process. Provider to review and confirm.)      Hypertension goal BP (blood pressure) < 140/90      Priority: Medium    Chronic pancreatitis (H)      Priority: Medium     10/08:  idiopathic, Dr. Hidalgo, Dr. Marcus, Dr. Preston Pastrana. Tx: PPI, antioxidants, pancreatic enzymes.    Has had an extensive workup of her chronic postprandial abdominal pain, bloating, and early satiety including gastric emptying study (3/09) and MRI/MRCP (3/09) in addition to EGD (10/08) and endoscopic ultrasound (11/08).  MRCP was suggestive of idiopathic chronic pancreatitis.   I do have records from Dr. [facility].  She was referred to him by Dr. Hidalgo and Dr. Pastrana (though I don't have their records).  She was prescribed antioxidant vitamins and pancreatic enzymes as well as her PPI.  She states she had an allergic reaction to the  enzymes (creon) which she attributes to the fact that there is pork in the capsules.  She is frustrated that no one has cured her of her pain.        PCOS (polycystic ovarian syndrome)      Priority: Medium    Moderate persistent asthma      Priority: Medium    Seasonal affective disorder (H24)      Priority: Medium     SAD      Allergic rhinitis      Priority: Medium     Allergy, airborne subst         Past Surgical History:  Past Surgical History:   Procedure Laterality Date    C/SECTION, LOW TRANSVERSE  1996        CARDIAC SURGERY      cardiac stent- recent in 16; 4 other stents    COLONOSCOPY N/A 2023    Procedure: COLONOSCOPY, WITH POLYPECTOMY;  Surgeon: Percy Gross MD;  Location: [facility] OR    DILATION AND CURETTAGE N/A 2016    Procedure: DILATION AND CURETTAGE;  Surgeon: Nahed Butler MD;  [facility]    ENDOMETRIAL SAMPLING (BIOPSY) N/A 2023    Procedure: ENDOMETRIAL BIOPSY;  Surgeon: Nahed Butler MD;  [facility]    ESOPHAGOSCOPY, GASTROSCOPY, DUODENOSCOPY (EGD), COMBINED N/A 2022    Procedure: ESOPHAGOGASTRODUODENOSCOPY, WITH BIOPSY;  Surgeon: Enzo Sesay MD;  Location: [facility]    ESOPHAGOSCOPY, GASTROSCOPY, DUODENOSCOPY (EGD), COMBINED N/A 2023    Procedure: ESOPHAGOGASTRODUODENOSCOPY, WITH BIOPSY;  Surgeon: Percy Gross MD;  Location: [facility] OR    EXAM UNDER ANESTHESIA PELVIC N/A 2023    Procedure: EXAM UNDER ANESTHESIA;  Surgeon: Nahed Butler MD;  [facility]    HC UGI ENDOSCOPY W EUS  2008    Dr. Pastrana, possible chronic pancreatitis, fatty liver    HERNIA REPAIR  2012    [place] [facility] [place]    INSERT INTRAUTERINE DEVICE N/A 2016    Procedure: INSERT INTRAUTERINE DEVICE;  Surgeon: Nahed Butler MD;  [facility]    INT UTERINE FIBRIOD EMBOLIZATION  10/29/2014    LAPAROSCOPIC CHOLECYSTECTOMY  2008    Dr. Arnol GRUBBS TX, CERVICAL  1992    s/p HUMERA, done at [place]  [facility] in [place].    ORBITOTOMY Right 03/15/2016    Procedure: ORBITOTOMY;  Surgeon: Myron Cyr MD;  Location: [facility]    ORBITOTOMY Right 08/04/2017    Procedure: ORBITOTOMY;  RIGHT ORBITOTOMY AND BIOPSY;  Surgeon: Charis Holbrook MD;  Location: [facility]    REMOVE INTRAUTERINE DEVICE N/A 4/28/2023    Procedure: Remove intrauterine device,;  Surgeon: Nahed Butler MD;  [facility]    REPAIR PTOSIS Right 11/17/2017    Procedure: REPAIR PTOSIS;  RIGHT UPPER LID PTOSIS REPAIR;  Surgeon: Myron Cyr MD;  Location: [facility]    UPPER GI ENDOSCOPY  10/21/2008    mild gastritis, Dr. Rocky [facility] ECHO HEART XTHORACIC,COMPLETE, W/O DOPPLER  02/04/2004    [facility] WNL, EF 60%     Medications:  Current Outpatient Medications   Medication Sig Dispense Refill    acetaminophen (TYLENOL) 325 MG tablet Take 1-2 tablets (325-650 mg) by mouth every 6 hours as needed for pain (headache) 250 tablet 4    ADVAIR DISKUS 250-50 MCG/ACT inhaler Inhale 1 puff into the lungs 2 times daily      albuterol (2.5 MG/3ML) 0.083% neb solution INL 1 VIAL VIA NEBULIZATION Q 4 TO 6 HOURS PRN  1    albuterol (PROAIR HFA, PROVENTIL HFA, VENTOLIN HFA) 108 (90 BASE) MCG/ACT inhaler Inhale 2 puffs into the lungs every 6 hours as needed for shortness of breath / dyspnea or wheezing 3 Inhaler 1    BANOPHEN 25 MG tablet Take 25 mg by mouth At Bedtime      blood glucose (NO BRAND SPECIFIED) lancets standard Use to test blood sugar 1-4 times daily or as directed. 400 each 3    blood glucose (NO BRAND SPECIFIED) test strip Use to test blood sugar fasting each am and predinner daily. 250 strip 3    blood glucose monitoring (NO BRAND SPECIFIED) meter device kit Use to test blood sugar 2 times daily or as directed. 1 kit 0    Blood Pressure Monitor KIT 1 each daily Monitor home blood pressure as instructed by physician.  Dispense [place] blood pressure kit. 1 kit 0    calcium carbonate (TUMS) 500 MG chewable tablet Take 3-4  tablets (1,500-2,000 mg) by mouth daily as needed 90 tablet 3    clopidogrel (PLAVIX) 75 MG tablet Take 1 tablet (75 mg) by mouth daily . 90 tablet 3    clotrimazole (MYCELEX) 10 MG lozenge Place 1 lozenge (10 mg) inside cheek 5 times daily 50 lozenge 1    cyclobenzaprine (FLEXERIL) 10 MG tablet Take 1 tablet (10 mg) by mouth 2 times daily as needed for muscle spasms 60 tablet 3    cycloSPORINE (RESTASIS) 0.05 % ophthalmic emulsion 1 month supply 11 refills 1 each 11    dicyclomine (BENTYL) 20 MG tablet TAKE 1 TABLET(20 MG) BY MOUTH FOUR TIMES DAILY AS NEEDED. 60 tablet 4    empagliflozin (JARDIANCE) 10 MG TABS tablet Take 1 tablet (10 mg) by mouth daily 30 tablet 3    EPIPEN 2-RIKY 0.3 MG/0.3ML injection INJECT 0.3 MG INTO THE MUSCLE PRF ANAPHYALAXIS  0    esomeprazole (NEXIUM) 40 MG DR capsule Take 1 capsule (40 mg) by mouth 2 times daily Take 30-60 minutes before eating. 180 capsule 0    estradiol (VAGIFEM) 10 MCG TABS vaginal tablet Place 1 tablet (10 mcg) vaginally twice a week 24 tablet 3    fluticasone (FLONASE) 50 MCG/ACT nasal spray Spray 1 spray in nostril as needed      folic acid (FOLVITE) 1 MG tablet Take 1 tablet (1 mg) by mouth daily 90 tablet 0    hydrocortisone (CORTAID) 1 % external cream Apply topically 2 times daily (Patient taking differently: Apply topically as needed) 60 g 1    insulin glargine (LANTUS PEN) 100 UNIT/ML pen Inject 55 Units Subcutaneous every morning Or as directed. (Patient taking differently: Inject 58 Units Subcutaneous every morning Or as directed.) 75 mL 3    insulin pen needle (BD PEN NEEDLE MARCK 2ND GEN) 32G X 4 MM miscellaneous Use one pen needle daily or as directed. 100 each 3    ketoconazole (NIZORAL) 2 % shampoo Apply topically daily as needed      levocetirizine (XYZAL) 5 MG tablet Take 5 mg by mouth as needed      lisinopril-hydrochlorothiazide (ZESTORETIC) 20-25 MG tablet Take 1 tablet by mouth daily 90 tablet 3    LORazepam (ATIVAN) 0.5 MG tablet Take 1 tablet  30-60 minutes before your scheduled MRI 1 tablet 0    magnesium 250 MG tablet TAKE 1 TABLET(250 MG) BY MOUTH TWICE DAILY 60 tablet 3    metFORMIN (GLUCOPHAGE XR) 500 MG 24 hr tablet Take 4 tablets (2,000 mg) by mouth daily (with dinner) 360 tablet 3    metoprolol succinate ER (TOPROL XL) 100 MG 24 hr tablet Take 1 tablet (100 mg) by mouth daily 90 tablet 3    Multiple Vitamin (TAB-A-GERALD) TABS Take 1 tablet by mouth daily 90 tablet 1    nitroGLYcerin (NITROSTAT) 0.4 MG sublingual tablet For chest pain place 1 tablet under the tongue every 5 minutes for 3 doses. If symptoms persist 5 minutes after 1st dose call 911. 30 tablet 11    nitroGLYcerin (NITROSTAT) 0.4 MG sublingual tablet Place 1 tablet (0.4 mg) under the tongue every 5 minutes as needed for chest pain . After 3 doses, if pain persists call 911. 25 tablet 3    olopatadine (PATANOL) 0.1 % ophthalmic solution Place 1 drop into both eyes as needed      ondansetron (ZOFRAN ODT) 8 MG ODT tab Take 1 tablet (8 mg) by mouth every 8 hours as needed for nausea 20 tablet 1    pravastatin (PRAVACHOL) 40 MG tablet Take 1 tablet (40 mg) by mouth daily 90 tablet 3    pregabalin (LYRICA) 25 MG capsule Take one po qd 30 capsule 1    prochlorperazine (COMPAZINE) 5 MG tablet Take 1 tablet (5 mg) by mouth every 6 hours as needed for nausea or vomiting 60 tablet 3    sennosides (SENOKOT) 8.6 MG tablet 1-2 tabs a day as needed for constipation 60 tablet 1    spironolactone (ALDACTONE) 25 MG tablet Take 1 tablet (25 mg) by mouth daily. May also take 0.5 tablets (12.5 mg) daily as needed (for weight gain). 45 tablet 9    sucralfate (CARAFATE) 1 GM tablet Take 1 tablet (1 g) by mouth 4 times daily 120 tablet 3    terbinafine (LAMISIL) 1 % external cream Apply topically 2 times daily (Patient taking differently: Apply topically as needed) 42 g 1    tiZANidine (ZANAFLEX) 4 MG tablet Take 1 tablet (4 mg) by mouth 3 times daily as needed for muscle spasms 21 tablet 3    traMADol  (ULTRAM) 50 MG tablet TAKE 1 TABLET(50 MG) BY MOUTH EVERY 8 HOURS AS NEEDED FOR SEVERE PAIN 60 tablet 3    vitamin D3 (CHOLECALCIFEROL) 50 mcg (2000 units) tablet Take 1 tablet (50 mcg) by mouth daily 90 tablet 1    vitamin D3 (CHOLECALCIFEROL) 50 mcg (2000 units) tablet Take 1 tablet (50 mcg) by mouth daily 90 tablet 0     Allergies:     Allergies   Allergen Reactions    Amoxicillin-Pot Clavulanate      Unknown possible hives and edema    Amoxicillin-Pot Clavulanate     Artificial Sweetner (Informational Only)  Other (See Comments)     Increased headache    Azithromycin     Clavulanic Acid     Diatrizoate Other (See Comments)     Pt wants this listed because she is allergic to shellfish     Imitrex [Triptans]      Severe face/neck/chest tightness and flushing side effects     Penicillins Hives     Unknown     Pork Allergy      Stomach pain, cramping, diarrhea, itching, nausea and headaches    Shellfish Allergy Hives and Swelling    Shellfish-Derived Products     Sulfa Antibiotics Hives and Swelling    Sulfatolamide     Zithromax [Azithromycin Dihydrate] Swelling     Unknown      Social History:  Home situation:   Occupation/Schooling:   Tobacco use:   Alcohol use:   Drug use:   History of chemical dependency treatment:     Family history:  Family History   Problem Relation Age of Onset    Hypertension Mother     Arthritis Mother     Heart Disease Mother         long qt syndrome    Thyroid Disease Mother     C.A.D. Mother     Heart Disease Father 50        heart attack    Cerebrovascular Disease Father     Diabetes Father     Hypertension Father     Depression Father     C.A.D. Father     Neurologic Disorder Sister         migraines    Neurologic Disorder Sister         migraines    Heart Disease Brother 15        MI at 15, 16.     Arthritis Brother         he passed away, had severe arthritis at age 11    C.A.D. Brother     Anesthesia Reaction Son         PONV    Respiratory Son         asthma     "Hypertension Maternal Aunt     Diabetes Maternal Uncle     Hypertension Maternal Uncle     Arthritis Maternal Uncle     Eye Disorder Maternal Uncle         cataracts    Cerebrovascular Disease Maternal Uncle     Family History Negative Other         neg for RA, SLE    Unknown/Adopted No family hx of         unknown neurological issues in her family, mother was adopted    Skin Cancer No family hx of         No known family hx of skin cancer    Deep Vein Thrombosis (DVT) No family hx of          Review of Systems:    POSTIVE IN BOLD  GENERAL: fever/chills, fatigue, general unwell feeling, weight gain/loss.  HEAD/EYES:  headache, dizziness, or vision changes.    EARS/NOSE/THROAT:  Nosebleeds, hearing loss, sinus infection, earache, tinnitus.  IMMUNE:  Allergies, cancer, immune deficiency, or infections.  SKIN:  Urticaria, rash, hives  HEME/Lymphatic:   anemia, easy bruising, easy bleeding.  RESPIRATORY:  cough, wheezing, or shortness of breath  CARDIOVASCULAR/Circulation:  Extremity edema, syncope, hypertension, tachycardia, or angina.  GASTROINTESTINAL:  abdominal pain, nausea/emesis, diarrhea, constipation,  hematochezia, or melena.  ENDOCRINE:  Diabetes, steroid use,  thyroid disease or osteoporosis.  MUSCULOSKELETAL: neck pain, back pain, arthralgia, arthritis, or gout.  GENITOURINARY:  frequency, urgency, dysuria, difficulty voiding, hematuria or incontinence.  NEUROLOGIC:  weakness, numbness, paresthesias, seizure, tremor, stroke or memory loss.  PSYCHIATRIC:  depression, anxiety, stress, suicidal thoughts or mood swings.     Physical Exam:  Vitals:    03/13/24 1213   BP: 121/77   BP Location: Right arm   Patient Position: Chair   Cuff Size: Adult Large   Pulse: 71   SpO2: 99%   Weight: 76.2 kg (168 lb)   Height: 1.6 m (5' 3\")     Exam:  Constitutional: healthy, alert, and no distress  Head: normocephalic. Atraumatic.   Eyes: no redness or jaundice noted   ENT: oropharnx normal.  MMM.  Neck supple.  "   Respiratory: clear   : deferred  Skin: no suspicious lesions or rashes  Psychiatric: mentation appears normal and affect normal/bright    Musculoskeletal exam:  Gait/Station/Posture: normal      Lumbar spine: WNL ROM with f/e/bl rotation       Myofascial tenderness:  positive paraspinal bilaterally  Straight leg exam: negative  Rudy's maneuver: negative    Neurologic exam:  CN:  Cranial nerves 2-12 are normal  Motor:  5/5 UE and LE strength  Sensory:  (upper and lower extremities):   Light touch: normal    Allodynia: absent    Dysethesia: absent    Hyperalgesia: absent     Diagnostic tests:  No pertinent imaging available - will discuss attaining XR lumbar spine    Other testing (labs, diagnostics) reviewed:  Labs  Lab Results   Component Value Date    A1C 8.4 03/15/2024    A1C 9.5 06/21/2023    A1C 8.5 11/23/2022    A1C 10.3 08/19/2022    A1C 11.2 05/14/2022    A1C 8.0 06/18/2020    A1C 9.2 03/06/2019    A1C 9.6 11/09/2018    A1C 8.4 11/15/2017    A1C 8.8 06/28/2017     Last Comprehensive Metabolic Panel:  Lab Results   Component Value Date     12/14/2023    POTASSIUM 4.0 12/14/2023    CHLORIDE 101 12/14/2023    CO2 26 12/14/2023    ANIONGAP 12 12/14/2023     (H) 12/14/2023    BUN 18.1 12/14/2023    CR 1.07 (H) 02/12/2024    GFRESTIMATED 60 (L) 02/12/2024    KATHY 9.5 12/14/2023         [place] Prescription Monitoring Program reviewed    Outside records reviewed      Assessment:  Fibromyalgia  Chronic Pain Syndrome  Chronic low back pain     Janine Cornell is a 58 year old female who presents with the complaints of chronic pain throughout multiple foci throughout her body, most consistent with myofascial pain. She has been evaluated by other specialties but has not had any imaging. We discussed having her attain XR of lumbar spine and then we can discuss after that. She is not interested in other conservative therapies that I suggested, including PT    Plan:  Diagnosis reviewed, treatment option  addressed, and risk/benefits discussed.  Self-care instructions given.  I am recommending a multidisciplinary treatment plan to help this patient better manage her pain.      Physical Therapy: patient deferred  Pain Psychologist to address issues of relaxation, behavioral change, coping style, and other factors important to improvement: patient deferred  Diagnostic Studies: XR Lumbar spine  Medication Management: no changes  Further procedures recommended: not at this time, will evaluate only if A1c is appropriate    Follow up: after XR Lumbar spine    Total time spent was 40 minutes, and more than 50% of face to face time was spent in counseling and/or coordination of care regarding principles of multidisciplinary care, medication management, and therapeutic options.         Again, thank you for allowing me to participate in the care of your patient.      Sincerely,    Linda Fuchs MD

## 2024-03-13 NOTE — TELEPHONE ENCOUNTER
[facility] referral review- EUS with Dr Earl accepted in the queue  Lilia Beltran, RN Care Coordinator

## 2024-03-13 NOTE — NURSING NOTE
Is the patient currently in the state of [place]? YES    Visit mode:VIDEO    If the visit is dropped, the patient can be reconnected by: VIDEO VISIT: Text to cell phone:   Telephone Information:   Mobile [phone]       Will anyone else be joining the visit? NO  (If patient encounters technical issues they should call [phone]    How would you like to obtain your AVS? MyChart    Are changes needed to the allergy or medication list? No    Reason for visit: RECHECK    Nancy MONTEIROF

## 2024-03-13 NOTE — PROGRESS NOTES
GI FOLLOW UP VISIT     HPI: Janine Cornell is 58 year old female with pmhx of chronic pancreatitis and diabetes who presents for follow up on her results. Please see initial consult for more details. Prior HPI inserted below:      Initial Consult HPI January 17, 2024  She reports having abdominal symptoms for over 20 years.  She has been to multiple providers and no one can seem to tell her what is wrong.  She has tried multiple medications without resolution.      She has an epigastric abdominal pain that can radiate to her back. She wakes up every day with nausea.  She has Zofran and Compazine as needed which temporarily help.  Pain can develop anytime she eats.  It does not seem to matter what foods however acidic foods do seem to bother her more.  Tomatoes especially.  She can have bile and mucus regurgitation that occurs more than twice a week.  She has early satiety.  At times it feels like there is a tightness or a band around her upper stomach.  She does have history of duodenal ulcer in 2022.  More recent upper endoscopy from May 2023 did note mild chronic inactive gastritis however was without ulcer disease. This was thought to be related to NSAID use which she has since stopped completely without much change. She has been on PPIs off-and-on over the years without much benefit to her chronic pain.  She was placed back on this last month.  It was prescribed as twice daily however she is only taking it once daily.  She does on occasion take a second dose.  Overall does not feel that it is helping much.  She has also used Pepcid, sucralfate as well as Bentyl without significant improvement.  She has had her weight fluctuate by 10 pounds but reports her weight as of recently has been stable.  She may on occasion of constipation however she is mostly having frequent formed bowel movements.  She has 2-6 BMs a day.  Feels like she is emptying out. She had a colonoscopy in May 2023 which was unrevealing.  She  has had stool studies last year including fecal elastase which low end of normal range.      She does have history of chronic pancreatitis.  She was seen by Dr. Julio César Marcus in 2009.  She was placed on digestive enzymes which unfortunately had pork which she cannot tolerate.  Has not been back on enzymes since then.     She was hospitalized in 2021 for acute on chronic pancreatitis.  At that time an MRI noted an abnormality at the pancreatic uncinate process which may have been related to the acute pancreatitis.  She was advised to have a EUS as an outpatient however it appears she lost follow up.     Today, March 13, 2024, She returns for follow up to review results.  We further assessed her pancreas with an MRI which she completed on 2/21/2024: Findings of chronic pancreatitis with somewhat masslike thickening of the pancreatic head which has decreased in size compared to the prior exam 5/25/2021.  Minimal pancreatic ductal dilatation after administration of secretin.  Hepatic steatosis.  These MRI results were reviewed with my collaborating physician Dr. Earl.  We recommended further evaluation with endoscopic ultrasound. She was also found to have hepatic steatosis on MRI of pancreas    Blood work was done at the time of her last visit for fat-soluble vitamins noted a mildly increased vitamin D likely related to supplementation.  Vitamins A, E and K were within the normal range.    She also had a gastric emptying scan on 1/26/2024 which was normal.     ALLERGIES:Amoxicillin-pot clavulanate, Amoxicillin-pot clavulanate, Artificial sweetner (informational only) , Azithromycin, Clavulanic acid, Diatrizoate, Imitrex [triptans], Penicillins, Pork allergy, Shellfish allergy, Shellfish-derived products, Sulfa antibiotics, Sulfatolamide, and Zithromax [azithromycin dihydrate]       PERTINENT MEDICATIONS:  Current Outpatient Medications   Medication    acetaminophen (TYLENOL) 325 MG tablet    ADVAIR DISKUS 250-50  MCG/ACT inhaler    albuterol (2.5 MG/3ML) 0.083% neb solution    albuterol (PROAIR HFA, PROVENTIL HFA, VENTOLIN HFA) 108 (90 BASE) MCG/ACT inhaler    BANOPHEN 25 MG tablet    blood glucose (NO BRAND SPECIFIED) lancets standard    blood glucose (NO BRAND SPECIFIED) test strip    blood glucose monitoring (NO BRAND SPECIFIED) meter device kit    Blood Pressure Monitor KIT    calcium carbonate (TUMS) 500 MG chewable tablet    clopidogrel (PLAVIX) 75 MG tablet    clotrimazole (MYCELEX) 10 MG lozenge    cyclobenzaprine (FLEXERIL) 10 MG tablet    cycloSPORINE (RESTASIS) 0.05 % ophthalmic emulsion    dicyclomine (BENTYL) 20 MG tablet    empagliflozin (JARDIANCE) 10 MG TABS tablet    EPIPEN 2-RIKY 0.3 MG/0.3ML injection    esomeprazole (NEXIUM) 40 MG DR capsule    estradiol (VAGIFEM) 10 MCG TABS vaginal tablet    fluticasone (FLONASE) 50 MCG/ACT nasal spray    folic acid (FOLVITE) 1 MG tablet    hydrocortisone (CORTAID) 1 % external cream    insulin glargine (LANTUS PEN) 100 UNIT/ML pen    insulin pen needle (BD PEN NEEDLE MARCK 2ND GEN) 32G X 4 MM miscellaneous    ketoconazole (NIZORAL) 2 % shampoo    levocetirizine (XYZAL) 5 MG tablet    lisinopril-hydrochlorothiazide (ZESTORETIC) 20-25 MG tablet    LORazepam (ATIVAN) 0.5 MG tablet    magnesium 250 MG tablet    metFORMIN (GLUCOPHAGE XR) 500 MG 24 hr tablet    metoprolol succinate ER (TOPROL XL) 100 MG 24 hr tablet    Multiple Vitamin (TAB-A-GERALD) TABS    nitroGLYcerin (NITROSTAT) 0.4 MG sublingual tablet    nitroGLYcerin (NITROSTAT) 0.4 MG sublingual tablet    olopatadine (PATANOL) 0.1 % ophthalmic solution    ondansetron (ZOFRAN ODT) 8 MG ODT tab    pravastatin (PRAVACHOL) 40 MG tablet    pregabalin (LYRICA) 25 MG capsule    prochlorperazine (COMPAZINE) 5 MG tablet    sennosides (SENOKOT) 8.6 MG tablet    spironolactone (ALDACTONE) 25 MG tablet    sucralfate (CARAFATE) 1 GM tablet    terbinafine (LAMISIL) 1 % external cream    tiZANidine (ZANAFLEX) 4 MG tablet    traMADol  (ULTRAM) 50 MG tablet    vitamin D3 (CHOLECALCIFEROL) 50 mcg (2000 units) tablet    vitamin D3 (CHOLECALCIFEROL) 50 mcg (2000 units) tablet     No current facility-administered medications for this visit.        PHYSICAL EXAMINATION:  Constitutional: aaox3, cooperative, pleasant, not dyspneic/diaphoretic, no acute distress  GENERAL: alert and no distress  RESP: No audible wheeze, cough, or visible cyanosis.      RADIOLOGY:      Narrative & Impression   Liver MRI with secretin enhanced MRCP:      COMPARISON: CT 2/10/2023 and MRI 5/25/2021     HISTORY: Chronic pancreatitis     TECHNIQUE: Coronal T2 HASTE, sagittal T2 HASTE, axial T2 STIR, axial  HERIBERTO T2, In-phase and Out-of-phase axial breath-hold FLASH,  diffusion-weighted, and T1-weighted VIBE axial fat saturation images  were obtained. Thick and thin slab heavily T2-weighted MRCP images  were obtained. 3-D reformatted images were created by the  technologist. Following administration of secretin, T2-weighted HASTE  images were obtained at 1 minute intervals to 7 minutes, and an  additional 10 minutes image was also obtained.      Contrast and medications: 7.5 ml Gadavist     FINDINGS:   MRCP:      There is no pancreas divisum. The pancreatic duct  does not appear  abnormally dilated, and  no sidebranches are visualized . There are  multifocal areas of pancreatic ductal narrowing, most pronounced  within the pancreatic body and tail.     Pancreatic duct measures 3 mm prior to administration of secretin.  Following administration of secretin, it remains at 3 mm.  At 10  minutes following secretin administration, the diameter is 1 mm.     There is normal exocrine function, with contrast seen in the third  portion of the duodenum at 10 minutes following secretin  administration.     No intra-or extra hepatic biliary dilation.  The common bile duct  measures 6 mm.      Pancreas: There is a somewhat masslike thickening in the pancreatic  head measuring up to 15 mm  (28/14), this previously measured 2.6 cm on  5/25/2021. There is associated mildly dilated ductal branches in the  uncinate process, which are less conspicuous as compared to the prior  exam. Minimal parenchymal atrophy. Small cyst in the pancreatic head,  series 12 image 9 demonstrated measuring 5 mm.     Liver: Normal hepatic morphology. Hepatic steatosis. Tiny cyst in the  left lateral segment, series 4 image 3 suspected. No suspicious  arterial enhancing liver lesions.     Gallbladder: Surgically absent.     Spleen: Normal     Kidneys: Punctate simple renal cysts. No hydronephrosis.     Adrenal glands: Normal     Bowel: No evidence of bowel obstruction.     Lymph nodes: There are a few prominent lymph nodes at the chuy  hepatis. No suspicious lymphadenopathy.     Blood vessels: Patent vasculature.     Lung bases: The lung bases are clear.     Bones and soft tissues: No suspicious or aggressive appearing bone  lesions.     Mesentery and abdominal wall: Tiny fat containing umbilical hernia.     Ascites: None                                                                      IMPRESSION:  1. Findings of chronic pancreatitis with somewhat masslike thickening  of the pancreatic head which has decreased in size compared to the  prior exam 5/25/2021.  2. Minimal pancreatic ductal dilatation after administration of  secretin.  3. Hepatic steatosis.     I have personally reviewed the examination and initial interpretation  and I agree with the findings.     CRISS LEE MD        NM GASTRIC EMPTYING, 1/26/2024 11:56 AM     HISTORY: Chronic epigastric pain; Chronic epigastric pain; Early  satiety     TECHNIQUE: The patient ingested 2.0 mCi of Tc-99m sulfur colloid in 2  eggs, 2 toast with jelly, 1 glass of water. Static images were  acquired at approximately 1 hour intervals out through 4 hours using a  dual head gamma camera in an anterior and posterior position. The  calculation of emptying was based on dual  head imaging with geometric  mean calculations.  A Linear square fit was calculated to the emptying  curve to determine the amount of residual activity at each time point.     FINDINGS:   Percent retention at 60 min = 67%.  Percent retention at 120 min = 38%.  Percent retention at 180 min = 10%.     T 1/2 emptying time =  94 mins.                                                                      IMPRESSION: Normal gastric emptying (see normal ranges below).     =====================  Reference Range:  Gastric emptying  - 30 minutes: <70% retention (>30% emptying) suggests rapid emptying.  - 60 minutes: <90% retention (>10% emptying) is normal; less than 30%  retention (>70% emptying) suggests rapid emptying.  - 120 minutes: <60% retention (> 40% emptying) is normal.  - 180 minutes: <30% retention (> 70% emptying) is normal.  - 240 minutes: <10% retention (> 90% emptying) is normal.     Gastric emptying T-1/2:  Solid: The normal range is  minutes  Liquid only: Normal range is 10-45 minutes.  Liquid only-children: At 60 minutes, normal range is 44-58%.  Liquid only-infants: At 60 minutes, normal range is 32-64%.        NIRAJ TUCKER MD      ASSESSMENT/PLAN:    # pancreatic mass  # chronic pancreatitis   # N/v and # early satiety    # elevated Vitamin D     Janine Cornell is a 58 year old female with pmhx of chronic pancreatitis and diabetes who presents for follow up.     She has a hx of chronic abdominal pain for over 20 years. She develops post prandial abdominal pain, nausea, vomiting, reflux/regurgitation and early satiety. She has history of chronic pancreatitis and was seen by Dr. Julio César Marcus in 2009. MRCP with secretin at the time showed subtle evidence of chronic pancreatitis. She was given pancreatic enzymes and PPI. She however could not continue with medication because she developed significant side effects which she believes was related to pork ingredient of medication. She lost follow up  thereafter. She was hospitalized in 2021 with acute on chronic pancreatitis. There was an abnormality noted on MRI pancreas at the time and she was to follow up outpatient for EUS however she lost follow up. We repeated MRI pancreas on 2/21/2024:  Findings of chronic pancreatitis with somewhat masslike thickening of the pancreatic head which has decreased in size compared to the prior exam 5/25/2021.  Minimal pancreatic ductal dilatation after administration of secretin. These MRI results were reviewed with my collaborating physician Dr. Earl.  We recommended further evaluation with endoscopic ultrasound. We reviewed this in further detail today and she agrees with plan. We also discussed celiac plexus as treatment option for chronic pancreatitis. Offered referral to pain mgmt. She wishes to defer at this time. Fat soluble vitamins A, E and K were wnl. Vitamin D was elevated, from supplementation. Advised to hold. Will recheck vitamin D at this time.     We reviewed her 4 hr GES on 1/26/2024 which was normal. She still exhibits symptoms of gastroparesis however. Recommended smaller more frequent meals and proper glycemic control. Offered nutrition referral, she declines at this time.     She raised concern regarding all of her medications, their interactions and side effects and how this could be contributing to her symptoms. Referral to [facility] pharmacy to address.       She was also found to have hepatic steatosis on MRI of pancreas. Reviewed fatty liver diagnosis and mgmt. Her risk factors include DM, obesity, HTN, HLD. Her Fib-4 score is low at 0.93 which rules out advanced fibrosis. Recommended healthy life style measures through diet and exercise.     FIB-4 Calculation: 0.93 at 2/12/2024  8:56 AM  Calculated from:  SGOT/AST: 30 U/L at 2/12/2024  8:56 AM  SGPT/ALT: 27 U/L at 2/12/2024  8:56 AM  Platelets: 352 10e3/uL at 2/12/2024  8:56 AM  Age: 58 years      PLAN:   -- EUS to assess pancr mass-like area  --  referral to MTM to review medications  -- recheck vitamin D   -- Check B12 and folate   -- Patient to schedule a follow up after EUS       Thank you for this consultation.  It was a pleasure to participate in the care of this patient; please contact us with any further questions.        This note was created with voice recognition software, and while reviewed for accuracy, typos may remain.       I spent a total of 56 minutes on the day of the visit.   Time spent by me doing chart review, history and exam, documentation and further activities per the note      Benjamin Hyman PA-C  Division of Gastroenterology, Hepatology and Nutrition   [facility]            Video-Visit Details    Video Start Time: 1:12 PM    Type of service:  Video Visit    Video End Time:1:32 PM    Originating Location (pt. Location): Home    Distant Location (provider location):  Off-site    Platform used for Video Visit: Yao

## 2024-03-15 ENCOUNTER — ANCILLARY PROCEDURE (OUTPATIENT)
Dept: GENERAL RADIOLOGY | Facility: CLINIC | Age: 59
End: 2024-03-15
Attending: ANESTHESIOLOGY
Payer: COMMERCIAL

## 2024-03-15 ENCOUNTER — LAB (OUTPATIENT)
Dept: LAB | Facility: CLINIC | Age: 59
End: 2024-03-15
Payer: COMMERCIAL

## 2024-03-15 ENCOUNTER — ANCILLARY PROCEDURE (OUTPATIENT)
Dept: ULTRASOUND IMAGING | Facility: CLINIC | Age: 59
End: 2024-03-15
Attending: FAMILY MEDICINE
Payer: COMMERCIAL

## 2024-03-15 ENCOUNTER — TELEPHONE (OUTPATIENT)
Dept: GASTROENTEROLOGY | Facility: CLINIC | Age: 59
End: 2024-03-15

## 2024-03-15 ENCOUNTER — OFFICE VISIT (OUTPATIENT)
Dept: FAMILY MEDICINE | Facility: CLINIC | Age: 59
End: 2024-03-15
Payer: COMMERCIAL

## 2024-03-15 VITALS
WEIGHT: 166.7 LBS | HEIGHT: 63 IN | DIASTOLIC BLOOD PRESSURE: 90 MMHG | SYSTOLIC BLOOD PRESSURE: 130 MMHG | HEART RATE: 78 BPM | OXYGEN SATURATION: 97 % | BODY MASS INDEX: 29.54 KG/M2

## 2024-03-15 DIAGNOSIS — Q45.3 PANCREATIC ABNORMALITY: ICD-10-CM

## 2024-03-15 DIAGNOSIS — M19.90 INFLAMMATORY ARTHRITIS: ICD-10-CM

## 2024-03-15 DIAGNOSIS — D72.829 LEUKOCYTOSIS, UNSPECIFIED TYPE: ICD-10-CM

## 2024-03-15 DIAGNOSIS — E04.1 THYROID NODULE: ICD-10-CM

## 2024-03-15 DIAGNOSIS — E11.9 TYPE 2 DIABETES, HBA1C GOAL < 7% (H): ICD-10-CM

## 2024-03-15 DIAGNOSIS — K86.1 CHRONIC PANCREATITIS, UNSPECIFIED PANCREATITIS TYPE (H): ICD-10-CM

## 2024-03-15 DIAGNOSIS — M79.604 PAIN IN BOTH LOWER EXTREMITIES: ICD-10-CM

## 2024-03-15 DIAGNOSIS — M79.605 PAIN IN BOTH LOWER EXTREMITIES: ICD-10-CM

## 2024-03-15 DIAGNOSIS — R79.89 HIGH SERUM VITAMIN D: ICD-10-CM

## 2024-03-15 DIAGNOSIS — M54.17 LUMBOSACRAL RADICULOPATHY: ICD-10-CM

## 2024-03-15 DIAGNOSIS — R11.2 NAUSEA AND VOMITING, UNSPECIFIED VOMITING TYPE: ICD-10-CM

## 2024-03-15 DIAGNOSIS — E04.1 THYROID NODULE: Primary | ICD-10-CM

## 2024-03-15 LAB — HBA1C MFR BLD: 8.4 %

## 2024-03-15 PROCEDURE — 82607 VITAMIN B-12: CPT | Performed by: PHYSICIAN ASSISTANT

## 2024-03-15 PROCEDURE — 99215 OFFICE O/P EST HI 40 MIN: CPT | Performed by: FAMILY MEDICINE

## 2024-03-15 PROCEDURE — 76536 US EXAM OF HEAD AND NECK: CPT | Mod: GC | Performed by: STUDENT IN AN ORGANIZED HEALTH CARE EDUCATION/TRAINING PROGRAM

## 2024-03-15 PROCEDURE — G2211 COMPLEX E/M VISIT ADD ON: HCPCS | Performed by: FAMILY MEDICINE

## 2024-03-15 PROCEDURE — 72100 X-RAY EXAM L-S SPINE 2/3 VWS: CPT | Performed by: STUDENT IN AN ORGANIZED HEALTH CARE EDUCATION/TRAINING PROGRAM

## 2024-03-15 PROCEDURE — 99000 SPECIMEN HANDLING OFFICE-LAB: CPT | Performed by: PATHOLOGY

## 2024-03-15 PROCEDURE — 83036 HEMOGLOBIN GLYCOSYLATED A1C: CPT | Performed by: FAMILY MEDICINE

## 2024-03-15 PROCEDURE — 36415 COLL VENOUS BLD VENIPUNCTURE: CPT | Performed by: PATHOLOGY

## 2024-03-15 ASSESSMENT — ASTHMA QUESTIONNAIRES
QUESTION_4 LAST FOUR WEEKS HOW OFTEN HAVE YOU USED YOUR RESCUE INHALER OR NEBULIZER MEDICATION (SUCH AS ALBUTEROL): TWO OR THREE TIMES PER WEEK
QUESTION_5 LAST FOUR WEEKS HOW WOULD YOU RATE YOUR ASTHMA CONTROL: WELL CONTROLLED
ACT_TOTALSCORE: 16
QUESTION_1 LAST FOUR WEEKS HOW MUCH OF THE TIME DID YOUR ASTHMA KEEP YOU FROM GETTING AS MUCH DONE AT WORK, SCHOOL OR AT HOME: A LITTLE OF THE TIME
ACT_TOTALSCORE: 16
QUESTION_3 LAST FOUR WEEKS HOW OFTEN DID YOUR ASTHMA SYMPTOMS (WHEEZING, COUGHING, SHORTNESS OF BREATH, CHEST TIGHTNESS OR PAIN) WAKE YOU UP AT NIGHT OR EARLIER THAN USUAL IN THE MORNING: TWO OR THREE NIGHTS A WEEK
QUESTION_2 LAST FOUR WEEKS HOW OFTEN HAVE YOU HAD SHORTNESS OF BREATH: THREE TO SIX TIMES A WEEK

## 2024-03-15 NOTE — PATIENT INSTRUCTIONS
You should receive a call to schedule your ultrasound. However, the central imaging number is [phone] if you need it.

## 2024-03-15 NOTE — PROGRESS NOTES
"  Assessment & Plan     Thyroid nodule  Due, discussed  - US Thyroid; Future    Type 2 diabetes, HbA1c goal < 7% (H)  Others labs, eye MD utd  - Hemoglobin A1c; Future    Inflammatory arthritis  Dr Mckinnon notes rvwd    Pain in both lower extremities  Working w/ pain clinic now, per pt saw [facility] late 2023 and they did not have anything else to offer    Pancreatic abnormality  Discussed GI plans, she knows they rec'd EUS, she is considering but not sure about doing      47 minutes spent by me on the date of the encounter doing chart review, history and exam, documentation and further activities per the note; lengthy review multiple issues and specialty eval/treat plans    The longitudinal plan of care for the diagnosis(es)/condition(s) as documented were addressed during this visit. Due to the added complexity in care, I will continue to support Janine in the subsequent management and with ongoing continuity of care.        BMI  Estimated body mass index is 29.53 kg/m  as calculated from the following:    Height as of this encounter: 1.6 m (5' 3\").    Weight as of this encounter: 75.6 kg (166 lb 11.2 oz).   Weight management plan: Discussed healthy diet and exercise guidelines        No follow-ups on file.    Subjective   Janine is a 58 year old, presenting for the following health issues:  Follow Up (Pt reports abdominal pain; pain in knees and numbness in feet)      3/15/2024     1:08 PM   Additional Questions   Roomed by Michelle VÁSQUEZ     History of Present Illness       Reason for visit:  Follow up    She eats 4 or more servings of fruits and vegetables daily.She consumes 1 sweetened beverage(s) daily.She exercises with enough effort to increase her heart rate 30 to 60 minutes per day.  She exercises with enough effort to increase her heart rate 3 or less days per week. She is missing 1 dose(s) of medications per week.   Here in follow-up with her son  Still has the GI sx, recent GI notes rvwd, she is " considering the EUS they suggest  Thyroid US due  Due for DM lab  Has had SIBO test she thinks, we have ordered it, but I cannot find results, I've asked staff to check w/ GI to see if done      She has MR lumbar today later on she states, outside neurology did c spine MR late 2023, per pt she does not think they had further plans to see her back    Up to date pap, colonoscopy, mammogram, eye MD    We discussed chronic abdomen pain, lower extremity neuralgias, DM, rheumatologic process, and thyroid abnormalities, all at length, current symptoms and eval she's had and is having.   For GI we will see if SIBO test done otherwise she will continue with GI, it sounds like she is not entirely sure that she will do the EUS  For DM labs today and continue w/ Endo, she tries as hard as possible to eat healthy, cannot exercise much due to her sx  For Rheum she continues w/ Dr Mckinnon  For Thryoid due for US  For pain she is now working w/ pain clinic provider, imaging low back planned by them later today    Past Medical History:   Diagnosis Date    Abnormal glandular Papanicolaou smear of cervix 1992    Allergic rhinitis     Allergy, airborne subst    Arthritis     ASCVD (arteriosclerotic cardiovascular disease)     Chronic pain     Chronic pancreatitis (H)     idiopathic, Tx: PPI, antioxidants, pancreatic enzymes    Common migraine     Coronary artery disease     Costochondritis     Difficulty in walking(719.7)     Dyspnea on exertion     Ectasia, mammary duct     [facility], persistent nipple discharge    Elevated fasting glucose     Gastro-oesophageal reflux disease     Granulomatosis with polyangiitis (H)     Hernia     History of angina     Hyperlipidemia LDL goal < 100     Hypertension goal BP (blood pressure) < 140/90     Essential hypertension    Iron deficiency anemia     Ischemic cardiomyopathy     Menorrhagia     Migraine headaches     Mild persistent asthma     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 Matias, neg w/u, Rx'd w/prednisone    Ventral hernia, unspecified, without mention of obstruction or gangrene 2012     Past Surgical History:   Procedure Laterality Date    C/SECTION, LOW TRANSVERSE  1996        CARDIAC SURGERY      cardiac stent- recent in 16; 4 other stents    COLONOSCOPY N/A 2023    Procedure: COLONOSCOPY, WITH POLYPECTOMY;  Surgeon: Percy Gross MD;  Location: [facility] OR    DILATION AND CURETTAGE N/A 2016    Procedure: DILATION AND CURETTAGE;  Surgeon: Nahed Butler MD;  [facility]    ENDOMETRIAL SAMPLING (BIOPSY) N/A 2023    Procedure: ENDOMETRIAL BIOPSY;  Surgeon: Nahed Butler MD;  [facility]    ESOPHAGOSCOPY, GASTROSCOPY, DUODENOSCOPY (EGD), COMBINED N/A 2022    Procedure: ESOPHAGOGASTRODUODENOSCOPY, WITH BIOPSY;  Surgeon: Enzo Sesay MD;  [facility]: [facility]    ESOPHAGOSCOPY, GASTROSCOPY, DUODENOSCOPY (EGD), COMBINED N/A 2023    Procedure: ESOPHAGOGASTRODUODENOSCOPY, WITH BIOPSY;  Surgeon: Percy Gross MD;  Location: [facility] OR    EXAM UNDER ANESTHESIA PELVIC N/A 2023    Procedure: EXAM UNDER ANESTHESIA;  Surgeon: Nahed Butler MD;  [facility]    [facility] UGI ENDOSCOPY W EUS  2008    Dr. Pastrana, possible chronic pancreatitis,  fatty liver    HERNIA REPAIR  12/01/2012    done at [facility] [place]    INSERT INTRAUTERINE DEVICE N/A 07/06/2016    Procedure: INSERT INTRAUTERINE DEVICE;  Surgeon: Nahed Butler MD;  [facility]    INT UTERINE FIBRIOD EMBOLIZATION  10/29/2014    LAPAROSCOPIC CHOLECYSTECTOMY  05/28/2008    Dr. Arnol GRUBBS TX, CERVICAL  1992    s/p GORDONP, done at [facility] in [place].    ORBITOTOMY Right 03/15/2016    Procedure: ORBITOTOMY;  Surgeon: Myron Cyr MD;  [facility]    ORBITOTOMY Right 08/04/2017    Procedure: ORBITOTOMY;  RIGHT ORBITOTOMY AND BIOPSY;  Surgeon: Charis Holbrook MD;  [facility]    REMOVE INTRAUTERINE DEVICE N/A 4/28/2023    Procedure: Remove intrauterine device,;  Surgeon: Nahed Butler MD;  [facility]    REPAIR PTOSIS Right 11/17/2017    Procedure: REPAIR PTOSIS;  RIGHT UPPER LID PTOSIS REPAIR;  Surgeon: Myron Cyr MD;  Location:  [facility]    UPPER GI ENDOSCOPY  10/21/2008    mild gastritis, Dr. Rocky [facility]A ECHO HEART XTHORACIC,COMPLETE, W/O DOPPLER  02/04/2004    [facility] WNL, EF 60%     Current Outpatient Medications   Medication    acetaminophen (TYLENOL) 325 MG tablet    ADVAIR DISKUS 250-50 MCG/ACT inhaler    albuterol (2.5 MG/3ML) 0.083% neb solution    albuterol (PROAIR HFA, PROVENTIL HFA, VENTOLIN HFA) 108 (90 BASE) MCG/ACT inhaler    BANOPHEN 25 MG tablet    blood glucose (NO BRAND SPECIFIED) lancets standard    blood glucose (NO BRAND SPECIFIED) test strip    blood glucose monitoring (NO BRAND SPECIFIED) meter device kit    Blood Pressure Monitor KIT    calcium carbonate (TUMS) 500 MG chewable tablet    clopidogrel (PLAVIX) 75 MG tablet    clotrimazole (MYCELEX) 10 MG lozenge    cyclobenzaprine (FLEXERIL) 10 MG tablet    cycloSPORINE (RESTASIS) 0.05 % ophthalmic emulsion    dicyclomine (BENTYL) 20 MG tablet    empagliflozin (JARDIANCE) 10 MG TABS tablet    EPIPEN 2-RIKY 0.3 MG/0.3ML injection    esomeprazole (NEXIUM) 40 MG DR capsule     estradiol (VAGIFEM) 10 MCG TABS vaginal tablet    fluticasone (FLONASE) 50 MCG/ACT nasal spray    folic acid (FOLVITE) 1 MG tablet    hydrocortisone (CORTAID) 1 % external cream    insulin glargine (LANTUS PEN) 100 UNIT/ML pen    insulin pen needle (BD PEN NEEDLE MARCK 2ND GEN) 32G X 4 MM miscellaneous    ketoconazole (NIZORAL) 2 % shampoo    levocetirizine (XYZAL) 5 MG tablet    lisinopril-hydrochlorothiazide (ZESTORETIC) 20-25 MG tablet    LORazepam (ATIVAN) 0.5 MG tablet    magnesium 250 MG tablet    metFORMIN (GLUCOPHAGE XR) 500 MG 24 hr tablet    metoprolol succinate ER (TOPROL XL) 100 MG 24 hr tablet    Multiple Vitamin (TAB-A-GERALD) TABS    nitroGLYcerin (NITROSTAT) 0.4 MG sublingual tablet    nitroGLYcerin (NITROSTAT) 0.4 MG sublingual tablet    olopatadine (PATANOL) 0.1 % ophthalmic solution    ondansetron (ZOFRAN ODT) 8 MG ODT tab    pravastatin (PRAVACHOL) 40 MG tablet    pregabalin (LYRICA) 25 MG capsule    prochlorperazine (COMPAZINE) 5 MG tablet    sennosides (SENOKOT) 8.6 MG tablet    spironolactone (ALDACTONE) 25 MG tablet    sucralfate (CARAFATE) 1 GM tablet    terbinafine (LAMISIL) 1 % external cream    tiZANidine (ZANAFLEX) 4 MG tablet    traMADol (ULTRAM) 50 MG tablet    vitamin D3 (CHOLECALCIFEROL) 50 mcg (2000 units) tablet    vitamin D3 (CHOLECALCIFEROL) 50 mcg (2000 units) tablet     No current facility-administered medications for this visit.     Allergies   Allergen Reactions    Amoxicillin-Pot Clavulanate      Unknown possible hives and edema    Amoxicillin-Pot Clavulanate     Artificial Sweetner (Informational Only)  Other (See Comments)     Increased headache    Azithromycin     Clavulanic Acid     Diatrizoate Other (See Comments)     Pt wants this listed because she is allergic to shellfish     Imitrex [Triptans]      Severe face/neck/chest tightness and flushing side effects     Penicillins Hives     Unknown     Pork Allergy      Stomach pain, cramping, diarrhea, itching, nausea and  headaches    Shellfish Allergy Hives and Swelling    Shellfish-Derived Products     Sulfa Antibiotics Hives and Swelling    Sulfatolamide     Zithromax [Azithromycin Dihydrate] Swelling     Unknown      Family History   Problem Relation Age of Onset    Hypertension Mother     Arthritis Mother     Heart Disease Mother         long qt syndrome    Thyroid Disease Mother     C.A.D. Mother     Heart Disease Father 50        heart attack    Cerebrovascular Disease Father     Diabetes Father     Hypertension Father     Depression Father     C.A.D. Father     Neurologic Disorder Sister         migraines    Neurologic Disorder Sister         migraines    Heart Disease Brother 15        MI at 15, 16.     Arthritis Brother         he passed away, had severe arthritis at age 11    C.A.D. Brother     Anesthesia Reaction Son         PONV    Respiratory Son         asthma    Hypertension Maternal Aunt     Diabetes Maternal Uncle     Hypertension Maternal Uncle     Arthritis Maternal Uncle     Eye Disorder Maternal Uncle         cataracts    Cerebrovascular Disease Maternal Uncle     Family History Negative Other         neg for RA, SLE    Unknown/Adopted No family hx of         unknown neurological issues in her family, mother was adopted    Skin Cancer No family hx of         No known family hx of skin cancer    Deep Vein Thrombosis (DVT) No family hx of      Social History     Socioeconomic History    Marital status: Single     Spouse name: Not on file    Number of children: 1    Years of education: Not on file    Highest education level: Not on file   Occupational History     Employer: NONE    Tobacco Use    Smoking status: Former     Packs/day: 0.20     Years: 1.00     Additional pack years: 0.00     Total pack years: 0.20     Types: Cigarettes     Quit date: 2016     Years since quittin.1    Smokeless tobacco: Never   Vaping Use    Vaping Use: Every day    Substances: Nicotine   Substance and Sexual Activity     Alcohol use: No     Alcohol/week: 0.0 standard drinks of alcohol    Drug use: No    Sexual activity: Not Currently   Other Topics Concern    Parent/sibling w/ CABG, MI or angioplasty before 65F 55M? Yes   Social History Narrative    Balanced Diet - sometimes    Osteoporosis Prevention Measures - Dairy servings per day: 2 servings weekly    Regular Exercise -  Yes Describe walking 4 times a week    Dental Exam - NO    Seatbelts used - Yes    Self Breast Exam - Yes    Abuse: Current or Past (Physical, Sexual or Emotional)- No    Do you have any concerns about STD's -  No    Do you feel safe in your environment - No    Guns stored in the home - No    Sunscreen used - Yes    Lipids -  YES - Date: 053102    Glucose -  YES - Date: 012804    Eye Exam - YES - Date: one year ago    Colon Cancer Screening - No    Hemoccults - NO    Pap Test -  YES - Date: 070904, remote history of LEEP    Mammography - YES - Date: last spring, would like to discuss, needs a referral to [facility]    DEXA - NO    Immunizations reviewed and up to date - Yes, last td given in 1997 or 1998     Social Determinants of Health     Financial Resource Strain: Low Risk  (2/9/2024)    Financial Resource Strain     Within the past 12 months, have you or your family members you live with been unable to get utilities (heat, electricity) when it was really needed?: No   Food Insecurity: High Risk (2/9/2024)    Food Insecurity     Within the past 12 months, did you worry that your food would run out before you got money to buy more?: Yes     Within the past 12 months, did the food you bought just not last and you didn t have money to get more?: No   Transportation Needs: Low Risk  (2/9/2024)    Transportation Needs     Within the past 12 months, has lack of transportation kept you from medical appointments, getting your medicines, non-medical meetings or appointments, work, or from getting things that you need?: No   Physical Activity: Not  "on file   Stress: Not on file   Social Connections: Not on file   Interpersonal Safety: Low Risk  (2/9/2024)    Interpersonal Safety     Do you feel physically and emotionally safe where you currently live?: Yes     Within the past 12 months, have you been hit, slapped, kicked or otherwise physically hurt by someone?: No     Within the past 12 months, have you been humiliated or emotionally abused in other ways by your partner or ex-partner?: No   Housing Stability: Low Risk  (2/9/2024)    Housing Stability     Do you have housing? : Yes     Are you worried about losing your housing?: No         Objective    BP (!) 130/90 (BP Location: Right arm, Patient Position: Sitting, Cuff Size: Adult Large)   Pulse 78   Ht 1.6 m (5' 3\")   Wt 75.6 kg (166 lb 11.2 oz)   SpO2 97%   BMI 29.53 kg/m    Body mass index is 29.53 kg/m .  Physical Exam   GENERAL: alert and no distress  MS: no gross musculoskeletal defects noted, no edema            Signed Electronically by: Kash oSlano MD    "

## 2024-03-15 NOTE — TELEPHONE ENCOUNTER
MTM referral from: [facility] visit (referral by provider)    MTM referral outreach attempt #2 on March 15, 2024 at 2:00 PM      Outcome: Patient will call back when she's ready, will route to MTM Pharmacist/Provider as an FYI. Thank you for the referral.     Use hbc for the carrier/Plan on the flowsheet        Reggie Cantrell [facility]

## 2024-03-16 LAB — VIT B12 SERPL-MCNC: 474 PG/ML (ref 232–1245)

## 2024-03-29 ENCOUNTER — TELEPHONE (OUTPATIENT)
Dept: OPHTHALMOLOGY | Facility: CLINIC | Age: 59
End: 2024-03-29

## 2024-03-29 DIAGNOSIS — H04.123 DRY EYE SYNDROME OF BOTH EYES: Primary | ICD-10-CM

## 2024-03-29 DIAGNOSIS — N95.2 ATROPHIC VAGINITIS: ICD-10-CM

## 2024-03-29 RX ORDER — CYCLOSPORINE 0.5 MG/ML
1 EMULSION OPHTHALMIC 2 TIMES DAILY
Qty: 5.5 ML | Refills: 11 | Status: SHIPPED | OUTPATIENT
Start: 2024-03-29 | End: 2024-06-05

## 2024-03-29 RX ORDER — ESTRADIOL 10 UG/1
10 INSERT VAGINAL
Qty: 24 TABLET | Refills: 3 | Status: SHIPPED | OUTPATIENT
Start: 2024-04-01

## 2024-03-29 NOTE — TELEPHONE ENCOUNTER
Received refill request for vagifem tablets. Pt last seen in clinic 6/16/23; refilled per protocol.

## 2024-04-27 DIAGNOSIS — R11.0 NAUSEA: ICD-10-CM

## 2024-04-30 NOTE — PROGRESS NOTES
Outcome for 04/30/24 2:32 PM: Left Voicemail  to bring meter to appt.  CORNELIO Acosta  Appointment reminder phone call made to patient.

## 2024-05-01 ENCOUNTER — OFFICE VISIT (OUTPATIENT)
Dept: ENDOCRINOLOGY | Facility: CLINIC | Age: 59
End: 2024-05-01
Payer: COMMERCIAL

## 2024-05-01 VITALS
SYSTOLIC BLOOD PRESSURE: 124 MMHG | DIASTOLIC BLOOD PRESSURE: 82 MMHG | OXYGEN SATURATION: 97 % | HEART RATE: 82 BPM | BODY MASS INDEX: 29.23 KG/M2 | WEIGHT: 165 LBS

## 2024-05-01 DIAGNOSIS — E11.9 TYPE 2 DIABETES, HBA1C GOAL < 7% (H): ICD-10-CM

## 2024-05-01 DIAGNOSIS — E11.9 TYPE 2 DIABETES, HBA1C GOAL < 8% (H): ICD-10-CM

## 2024-05-01 PROCEDURE — 99215 OFFICE O/P EST HI 40 MIN: CPT | Performed by: STUDENT IN AN ORGANIZED HEALTH CARE EDUCATION/TRAINING PROGRAM

## 2024-05-01 PROCEDURE — G2211 COMPLEX E/M VISIT ADD ON: HCPCS | Performed by: STUDENT IN AN ORGANIZED HEALTH CARE EDUCATION/TRAINING PROGRAM

## 2024-05-01 RX ORDER — METFORMIN HCL 500 MG
2000 TABLET, EXTENDED RELEASE 24 HR ORAL
Qty: 360 TABLET | Refills: 3 | Status: SHIPPED | OUTPATIENT
Start: 2024-05-01

## 2024-05-01 NOTE — PROGRESS NOTES
Endocrinology Clinic Visit     NAME:  Janine Cornell  PCP:  Kash Solano  MRN:  [id number]  Reason for Consult:  DM2 and MNG  Requesting Provider:  Kash Solano    Chief Complaint     Chief Complaint   Patient presents with    Endocrine Problem     Patient is requesting 6-12 months of refills instead of 3 for her Jardiance and Lantus Pens. In cronic pain.       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  I saw her on 10/2023, she saw Krissy Danielle 1/2024.      The patient was diagnosed with type 2 diabetes in 2010. She has been on jardiance, metformin and lantus for longtime. Same dose lantus for the past year. She used to have frequent nocturnal hypoglycemia. Lantus dose was adjusted.    Today she reported sx of low bg at least once a week. She was reluctant  about increasing her sglt2i last visit but open to it today.     Previous A1C in records reviewed.     Lab Results   Component Value Date    A1C 8.4 (H) 03/15/2024    A1C 8.3 (H) 10/18/2023    A1C 9.5 (H) 06/21/2023    A1C 8.5 (H) 11/23/2022    A1C 10.3 (H) 08/19/2022    A1C 11.2 (H) 05/14/2022    A1C 8.0 (H) 06/18/2020    A1C 9.2 (H) 03/06/2019    A1C 9.6 (H) 11/09/2018    A1C 8.4 (H) 11/15/2017    A1C 8.8 (H) 06/28/2017    HEMOGLOBINA1 8.3 10/18/2023    HEMOGLOBINA1 9.2 (A) 04/14/2014       Weight : stable   Wt Readings from Last 4 Encounters:   05/01/24 74.8 kg (165 lb)   03/15/24 75.6 kg (166 lb 11.2 oz)   03/13/24 76.2 kg (168 lb)   02/12/24 76.4 kg (168 lb 8 oz)         Diabetes Care/Complications:   Eyes: she follows regularly due to gpa. No DR.  Kidneys: last urine albumin 2/2024 negative  Nerves: numbness in both feet, prescribed lyrica 25 mg daily by  her PCP, she did not start it yet. She also follows with podiatry.   Smoking: yes  Blood Pressure: controlled on meds  Lipids: on pravastatin , ldl 43 on 2/2022  Macrovascular: hx of nstmi  Hypoglycemia: fell low when less than 90.     Previous DM related Hospitalization:    No     Current treatment strategy:     jardiance 10 mg   metformin 2 g daily  Lantus 56 units daily       Blood Glucose Monitoring:   She checks it 1 times a day  Refer to MA note    Diet: 1-2 meals per day.   Snacks on fruits  Drinks: no alcohol. Sometimes suagry drinks.       --------------------------------------------------------------------------------------------------------------------------  # MNG  She was seen by endocrine back in 2012 for MNG and abnormal TFT which corrected to nromal with no intervention. in 2014 seen by Dr. Funk for follow up, has known fat pad and was screened negative for cushing.     Janine was diagnosed with a multinodular goiter in 2012. The biopsy of the dominant right and left thyroid nodules on 9/17/12 revealed atypia of undetermined significance for the right thyroid nodule and benign  for the left thyroid nodule.     I reviewed her thyroid US in records. Most recent US on 1/2023 was compared to previous one in 2018:   Dominant right thyroid nodule which was biopsied with AUS result, it grew in size over 5 years from 1.5 x 1.3 x 1.5 cm to  2.0 x 1.3 x 1.2 cm. Left dominant nodule which was benign on biopsy decreased in size. She had an increase in size in one of the right thyroid nodules from  1.0 x 0.8 x 1.4 cm to  1.4 x 1.3 x 0.9 cm.    Last visit she reported she prefers to avoid FNA and would like to monitor.  Repeat US 3/2024 reviewed today multiple new subcentimeter nodules. 2 dominant nodules remains same size.    Social: she lives with her son, 26 y.o. she is unemployed.     Problem List     Patient Active Problem List   Diagnosis    Seasonal affective disorder (H24)    Allergic rhinitis    PCOS  (polycystic ovarian syndrome)    Moderate persistent asthma    Chronic pancreatitis (H)    Hypertension goal BP (blood pressure) < 140/90    Common migraine    NSTEMI (non-ST elevated myocardial infarction) (H)    Hyperlipidemia LDL goal <70    ASCVD (arteriosclerotic cardiovascular disease)    GERD (gastroesophageal reflux disease)    Ischemic cardiomyopathy    Hypertensive heart disease    Uterine leiomyoma    Iron deficiency anemia    Costochondritis    Vitamin D deficiency    Breast cancer screening    Spinal stenosis in cervical region    Fibromyalgia    Seronegative rheumatoid arthritis (H)    Type 2 diabetes, HbA1C goal < 8% (H)    Type 2 diabetes mellitus with other specified complication (H)    Hyperlipidemia associated with type 2 diabetes mellitus (H)    Hypertension associated with diabetes (H)    Overweight with body mass index (BMI) 25.0-29.9    Tobacco use disorder    Telogen effluvium    CAD S/P percutaneous coronary angioplasty    Status post coronary angiogram    ANCA-associated vasculitis (H)    Wegener's vasculitis    Type 1 diabetes mellitus with complications (H)    Chest discomfort    Urinary frequency    Abdominal pain, epigastric    Hypokalemia    Leukocytosis, unspecified type    Acute pancreatitis, unspecified complication status, unspecified pancreatitis type        Medications     Current Outpatient Medications   Medication Sig Dispense Refill    acetaminophen (TYLENOL) 325 MG tablet Take 1-2 tablets (325-650 mg) by mouth every 6 hours as needed for pain (headache) 250 tablet 4    ADVAIR DISKUS 250-50 MCG/ACT inhaler Inhale 1 puff into the lungs 2 times daily      albuterol (2.5 MG/3ML) 0.083% neb solution INL 1 VIAL VIA NEBULIZATION Q 4 TO 6 HOURS PRN  1    albuterol (PROAIR HFA, PROVENTIL HFA, VENTOLIN HFA) 108 (90 BASE) MCG/ACT inhaler Inhale 2 puffs into the lungs every 6 hours as needed for shortness of breath / dyspnea or wheezing 3 Inhaler 1    BANOPHEN 25 MG tablet Take 25 mg by  mouth At Bedtime      blood glucose (NO BRAND SPECIFIED) lancets standard Use to test blood sugar 1-4 times daily or as directed. 400 each 3    blood glucose (NO BRAND SPECIFIED) test strip Use to test blood sugar fasting each am and predinner daily. 250 strip 3    blood glucose monitoring (NO BRAND SPECIFIED) meter device kit Use to test blood sugar 2 times daily or as directed. 1 kit 0    Blood Pressure Monitor KIT 1 each daily Monitor home blood pressure as instructed by physician.  Dispense [place] blood pressure kit. 1 kit 0    calcium carbonate (TUMS) 500 MG chewable tablet Take 3-4 tablets (1,500-2,000 mg) by mouth daily as needed 90 tablet 3    clopidogrel (PLAVIX) 75 MG tablet Take 1 tablet (75 mg) by mouth daily . 90 tablet 3    clotrimazole (MYCELEX) 10 MG lozenge Place 1 lozenge (10 mg) inside cheek 5 times daily 50 lozenge 1    cyclobenzaprine (FLEXERIL) 10 MG tablet Take 1 tablet (10 mg) by mouth 2 times daily as needed for muscle spasms 60 tablet 3    cycloSPORINE (RESTASIS) 0.05 % ophthalmic emulsion Place 1 drop into both eyes 2 times daily 5.5 mL 11    cycloSPORINE (RESTASIS) 0.05 % ophthalmic emulsion 1 month supply 11 refills 1 each 11    dicyclomine (BENTYL) 20 MG tablet TAKE 1 TABLET(20 MG) BY MOUTH FOUR TIMES DAILY AS NEEDED. 60 tablet 4    empagliflozin (JARDIANCE) 25 MG TABS tablet Take 1 tablet (25 mg) by mouth daily 90 tablet 2    EPIPEN 2-RIKY 0.3 MG/0.3ML injection INJECT 0.3 MG INTO THE MUSCLE PRF ANAPHYALAXIS  0    esomeprazole (NEXIUM) 40 MG DR capsule Take 1 capsule (40 mg) by mouth 2 times daily Take 30-60 minutes before eating. 180 capsule 0    estradiol (VAGIFEM) 10 MCG TABS vaginal tablet Place 1 tablet (10 mcg) vaginally twice a week 24 tablet 3    fluticasone (FLONASE) 50 MCG/ACT nasal spray Spray 1 spray in nostril as needed      folic acid (FOLVITE) 1 MG tablet Take 1 tablet (1 mg) by mouth daily 90 tablet 0    hydrocortisone (CORTAID) 1 % external cream Apply topically 2 times  daily (Patient taking differently: Apply topically as needed) 60 g 1    insulin glargine (LANTUS PEN) 100 UNIT/ML pen Inject 56 Units Subcutaneous every morning Or as directed. 75 mL 1    insulin pen needle (BD PEN NEEDLE MARCK 2ND GEN) 32G X 4 MM miscellaneous Use one pen needle daily or as directed. 100 each 3    ketoconazole (NIZORAL) 2 % shampoo Apply topically daily as needed      levocetirizine (XYZAL) 5 MG tablet Take 5 mg by mouth as needed      lisinopril-hydrochlorothiazide (ZESTORETIC) 20-25 MG tablet Take 1 tablet by mouth daily 90 tablet 3    LORazepam (ATIVAN) 0.5 MG tablet Take 1 tablet 30-60 minutes before your scheduled MRI 1 tablet 0    magnesium 250 MG tablet TAKE 1 TABLET(250 MG) BY MOUTH TWICE DAILY 60 tablet 3    metFORMIN (GLUCOPHAGE XR) 500 MG 24 hr tablet Take 4 tablets (2,000 mg) by mouth daily (with dinner) 360 tablet 3    metoprolol succinate ER (TOPROL XL) 100 MG 24 hr tablet Take 1 tablet (100 mg) by mouth daily 90 tablet 3    Multiple Vitamin (TAB-A-GERALD) TABS Take 1 tablet by mouth daily 90 tablet 1    nitroGLYcerin (NITROSTAT) 0.4 MG sublingual tablet For chest pain place 1 tablet under the tongue every 5 minutes for 3 doses. If symptoms persist 5 minutes after 1st dose call 911. 30 tablet 11    nitroGLYcerin (NITROSTAT) 0.4 MG sublingual tablet Place 1 tablet (0.4 mg) under the tongue every 5 minutes as needed for chest pain . After 3 doses, if pain persists call 911. 25 tablet 3    olopatadine (PATANOL) 0.1 % ophthalmic solution Place 1 drop into both eyes as needed      ondansetron (ZOFRAN ODT) 8 MG ODT tab Take 1 tablet (8 mg) by mouth every 8 hours as needed for nausea 20 tablet 1    pravastatin (PRAVACHOL) 40 MG tablet Take 1 tablet (40 mg) by mouth daily 90 tablet 3    pregabalin (LYRICA) 25 MG capsule Take one po qd 30 capsule 1    prochlorperazine (COMPAZINE) 5 MG tablet Take 1 tablet (5 mg) by mouth every 6 hours as needed for nausea or vomiting 60 tablet 3    sennosides  (SENOKOT) 8.6 MG tablet 1-2 tabs a day as needed for constipation 60 tablet 1    spironolactone (ALDACTONE) 25 MG tablet Take 1 tablet (25 mg) by mouth daily. May also take 0.5 tablets (12.5 mg) daily as needed (for weight gain). 45 tablet 9    sucralfate (CARAFATE) 1 GM tablet Take 1 tablet (1 g) by mouth 4 times daily 120 tablet 3    terbinafine (LAMISIL) 1 % external cream Apply topically 2 times daily (Patient taking differently: Apply topically as needed) 42 g 1    tiZANidine (ZANAFLEX) 4 MG tablet Take 1 tablet (4 mg) by mouth 3 times daily as needed for muscle spasms 21 tablet 3    traMADol (ULTRAM) 50 MG tablet TAKE 1 TABLET(50 MG) BY MOUTH EVERY 8 HOURS AS NEEDED FOR SEVERE PAIN 60 tablet 3    vitamin D3 (CHOLECALCIFEROL) 50 mcg (2000 units) tablet Take 1 tablet (50 mcg) by mouth daily 90 tablet 1    vitamin D3 (CHOLECALCIFEROL) 50 mcg (2000 units) tablet Take 1 tablet (50 mcg) by mouth daily 90 tablet 0     No current facility-administered medications for this visit.        Allergies     Allergies   Allergen Reactions    Amoxicillin-Pot Clavulanate      Unknown possible hives and edema    Amoxicillin-Pot Clavulanate     Artificial Sweetner (Informational Only)  Other (See Comments)     Increased headache    Azithromycin     Clavulanic Acid     Diatrizoate Other (See Comments)     Pt wants this listed because she is allergic to shellfish     Imitrex [Triptans]      Severe face/neck/chest tightness and flushing side effects     Penicillins Hives     Unknown     Pork Allergy      Stomach pain, cramping, diarrhea, itching, nausea and headaches    Shellfish Allergy Hives and Swelling    Shellfish-Derived Products     Sulfa Antibiotics Hives and Swelling    Sulfatolamide     Zithromax [Azithromycin Dihydrate] Swelling     Unknown        Medical / Surgical History     Past Medical History:   Diagnosis Date    Abnormal glandular Papanicolaou smear of cervix 1992    Allergic rhinitis     Allergy, airborne subst     Arthritis     ASCVD (arteriosclerotic cardiovascular disease)     Chronic pain     Chronic pancreatitis (H)     idiopathic, Tx: PPI, antioxidants, pancreatic enzymes    Common migraine     Coronary artery disease     Costochondritis     Difficulty in walking(719.7)     Dyspnea on exertion     Ectasia, mammary duct     [facility], persistent nipple discharge    Elevated fasting glucose     Gastro-oesophageal reflux disease     Granulomatosis with polyangiitis (H)     Hernia     History of angina     Hyperlipidemia LDL goal < 100     Hypertension goal BP (blood pressure) < 140/90     Essential hypertension    Iron deficiency anemia     Ischemic cardiomyopathy     Menorrhagia     Migraine headaches     Mild persistent asthma     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 Matias, neg w/u, Rx'd w/prednisone    Ventral hernia, unspecified, without mention of obstruction or gangrene 2012     Past Surgical History:   Procedure Laterality Date    C/SECTION, LOW TRANSVERSE  1996        CARDIAC SURGERY      cardiac stent- recent in 16; 4 other stents    COLONOSCOPY N/A 2023    Procedure: COLONOSCOPY, WITH POLYPECTOMY;  Surgeon: Percy Gross MD;  Location: [facility] OR    DILATION AND CURETTAGE N/A 2016    Procedure: DILATION AND  CURETTAGE;  Surgeon: Nahed Butler MD;  [facility]    ENDOMETRIAL SAMPLING (BIOPSY) N/A 4/28/2023    Procedure: ENDOMETRIAL BIOPSY;  Surgeon: Nahed Butler MD;  [facility]    ESOPHAGOSCOPY, GASTROSCOPY, DUODENOSCOPY (EGD), COMBINED N/A 03/31/2022    Procedure: ESOPHAGOGASTRODUODENOSCOPY, WITH BIOPSY;  Surgeon: Enzo Sesay MD;  [facility]: [facility]    ESOPHAGOSCOPY, GASTROSCOPY, DUODENOSCOPY (EGD), COMBINED N/A 5/25/2023    Procedure: ESOPHAGOGASTRODUODENOSCOPY, WITH BIOPSY;  Surgeon: Percy Gross MD;  Location: [facility] OR    EXAM UNDER ANESTHESIA PELVIC N/A 4/28/2023    Procedure: EXAM UNDER ANESTHESIA;  Surgeon: Nahed Butler MD;  [facility]    HC UGI ENDOSCOPY W EUS  11/07/2008    Dr. Pastrana, possible chronic pancreatitis, fatty liver    HERNIA REPAIR  12/01/2012    [place] [facility] [place]    [place] INTRAUTERINE DEVICE N/A 07/06/2016    Procedure: INSERT INTRAUTERINE DEVICE;  Surgeon: Nahed Butler MD;  [facility]    INT UTERINE FIBRIOD EMBOLIZATION  10/29/2014    LAPAROSCOPIC CHOLECYSTECTOMY  05/28/2008    Dr. Arnol GRUBBS TX, CERVICAL  1992    s/p LEEP, done at [facility] in [place].    ORBITOTOMY Right 03/15/2016    Procedure: ORBITOTOMY;  Surgeon: Myron Cyr MD;  Location: [facility]    ORBITOTOMY Right 08/04/2017    Procedure: ORBITOTOMY;  RIGHT ORBITOTOMY AND BIOPSY;  Surgeon: Charis Holbrook MD;  Location: [facility]    REMOVE INTRAUTERINE DEVICE N/A 4/28/2023    Procedure: Remove intrauterine device,;  Surgeon: Nahed Butler MD;  [facility]    REPAIR PTOSIS Right 11/17/2017    Procedure: REPAIR PTOSIS;  RIGHT UPPER LID PTOSIS REPAIR;  Surgeon: Myron Cyr MD;  Location: [facility]    UPPER GI ENDOSCOPY  10/21/2008    mild gastritis, Dr. Rocky [facility] ECHO HEART XTHORACIC,COMPLETE, W/O DOPPLER  02/04/2004    [facility] WNL, EF 60%       Social History     Social History     Socioeconomic History    Marital status: Single      Spouse name: Not on file    Number of children: 1    Years of education: Not on file    Highest education level: Not on file   Occupational History     Employer: NONE    Tobacco Use    Smoking status: Some Days     Current packs/day: 0.00     Average packs/day: 0.2 packs/day for 1 year (0.2 ttl pk-yrs)     Types: Cigarettes     Start date: 2015     Last attempt to quit: 2016     Years since quittin.2    Smokeless tobacco: Never   Vaping Use    Vaping status: Every Day    Substances: Nicotine   Substance and Sexual Activity    Alcohol use: No     Alcohol/week: 0.0 standard drinks of alcohol    Drug use: No    Sexual activity: Not Currently   Other Topics Concern    Parent/sibling w/ CABG, MI or angioplasty before 65F 55M? Yes   Social History Narrative    Balanced Diet - sometimes    Osteoporosis Prevention Measures - Dairy servings per day: 2 servings weekly    Regular Exercise -  Yes Describe walking 4 times a week    Dental Exam - NO    Seatbelts used - Yes    Self Breast Exam - Yes    Abuse: Current or Past (Physical, Sexual or Emotional)- No    Do you have any concerns about STD's -  No    Do you feel safe in your environment - No    Guns stored in the home - No    Sunscreen used - Yes    Lipids -  YES - Date:     Glucose -  YES - Date:     Eye Exam - YES - Date: one year ago    Colon Cancer Screening - No    Hemoccults - NO    Pap Test -  YES - Date: , remote history of LEEP    Mammography - YES - Date: last spring, would like to discuss, needs a referral to [facility]    DEXA - NO    Immunizations reviewed and up to date - Yes, last td given in  or      Social Determinants of Health     Financial Resource Strain: Low Risk  (2024)    Financial Resource Strain     Within the past 12 months, have you or your family members you live with been unable to get utilities (heat, electricity) when it was really needed?: No   Food Insecurity: High Risk  (2024)    Food Insecurity     Within the past 12 months, did you worry that your food would run out before you got money to buy more?: Yes     Within the past 12 months, did the food you bought just not last and you didn t have money to get more?: No   Transportation Needs: Low Risk  (2024)    Transportation Needs     Within the past 12 months, has lack of transportation kept you from medical appointments, getting your medicines, non-medical meetings or appointments, work, or from getting things that you need?: No   Physical Activity: Not on file   Stress: Not on file   Social Connections: Not on file   Interpersonal Safety: Low Risk  (2024)    Interpersonal Safety     Do you feel physically and emotionally safe where you currently live?: Yes     Within the past 12 months, have you been hit, slapped, kicked or otherwise physically hurt by someone?: No     Within the past 12 months, have you been humiliated or emotionally abused in other ways by your partner or ex-partner?: No   Housing Stability: Low Risk  (2024)    Housing Stability     Do you have housing? : Yes     Are you worried about losing your housing?: No       Family History     Family History   Problem Relation Age of Onset    Hypertension Mother     Arthritis Mother     Heart Disease Mother         long qt syndrome    Thyroid Disease Mother     C.A.D. Mother     Heart Disease Father 50        heart attack    Cerebrovascular Disease Father     Diabetes Father     Hypertension Father     Depression Father     C.A.D. Father     Neurologic Disorder Sister         migraines    Neurologic Disorder Sister         migraines    Heart Disease Brother 15        MI at 15, 16.     Arthritis Brother         he passed away, had severe arthritis at age 11    C.A.D. Brother     Anesthesia Reaction Son         PONV    Respiratory Son         asthma    Hypertension Maternal Aunt     Diabetes Maternal Uncle     Hypertension Maternal Uncle      Arthritis Maternal Uncle     Eye Disorder Maternal Uncle         cataracts    Cerebrovascular Disease Maternal Uncle     Family History Negative Other         neg for RA, SLE    Unknown/Adopted No family hx of         unknown neurological issues in her family, mother was adopted    Skin Cancer No family hx of         No known family hx of skin cancer    Deep Vein Thrombosis (DVT) No family hx of        ROS     12 ROS completed, pertinent positive and negative in HPI    Physical Exam   /82 (BP Location: Right arm)   Pulse 82   Wt 74.8 kg (165 lb)   LMP  (LMP Unknown)   SpO2 97%   BMI 29.23 kg/m       General: Comfortable, no obvious distress, normal body habitus  Eyes: Sclera anicteric, moist conjunctiva  HENT: Atraumatic, oropharynx clear, moist mucous membranes with no mucosal ulcerations  CV: normal rate.   Resp:  good effort, no evidence of loud wheezing  Abdomen:  obese, non distended.   Skin: No rashes, lesions, or subcutaneous nodules on exposed skin.   Psych: Alert and oriented x 3. Appropriate affect, good insight  Extremities: No peripheral edema  Musculoskeletal: Appropriate muscle bulk and strength  Neuro: Moves all four extremities. No focal deficits on limited exam. Gait normal.       Labs/Imaging     Pertinent Labs were reviewed and updated in EPIC and discussed briefly.  Radiology Results were  reviewed and updated in EPIC and discussed briefly.    Summary of recent findings:   Lab Results   Component Value Date    A1C 8.5 11/23/2022    A1C 10.3 08/19/2022    A1C 11.2 05/14/2022    A1C 10.8 02/04/2022    A1C 8.0 06/18/2020    A1C 9.2 03/06/2019    A1C 9.6 11/09/2018    A1C 8.4 11/15/2017    A1C 8.8 06/28/2017       TSH   Date Value Ref Range Status   01/19/2023 0.40 0.30 - 4.20 uIU/mL Final   05/14/2022 1.48 0.40 - 4.00 mU/L Final   02/04/2022 0.36 (L) 0.40 - 4.00 mU/L Final   03/06/2019 0.25 (L) 0.40 - 4.00 mU/L Final   11/15/2017 0.10 (L) 0.40 - 4.00 mU/L Final   12/26/2016 0.24 (L) 0.40  "- 4.00 mU/L Final   08/25/2016 0.16 (L) 0.40 - 4.00 mU/L Final   10/30/2015 0.49 0.40 - 4.00 mU/L Final     T4 Free   Date Value Ref Range Status   03/06/2019 1.00 0.76 - 1.46 ng/dL Final   11/15/2017 1.04 0.76 - 1.46 ng/dL Final   12/26/2016 0.97 0.76 - 1.46 ng/dL Final   08/25/2016 1.06 0.76 - 1.46 ng/dL Final   10/30/2015 1.06 0.76 - 1.46 ng/dL Final     Free T4   Date Value Ref Range Status   02/04/2022 1.27 0.76 - 1.46 ng/dL Final       Creatinine   Date Value Ref Range Status   02/12/2024 1.07 (H) 0.51 - 0.95 mg/dL Final   05/28/2021 1.00 0.52 - 1.04 mg/dL Final       Recent Labs   Lab Test 02/08/22  0918 06/18/20  1500 01/27/16  1248 07/29/15  1340 02/04/15  1555   CHOL 109 102   < > 126 148   HDL 38* 30*   < > 36* 47*   LDL 43 50   < > 62 70   TRIG 141 104   < > 142 157*   CHOLHDLRATIO  --   --   --  3.5 3.1    < > = values in this interval not displayed.       No results found for: \"[id number]\", \"[id number]\", \"[id number]\"    I personally reviewed the patient's outside records from [facility] EMR and Care Everywhere. Summary of pertinent findings in HPI.    Impression / Plan     1. Diabetes Mellitus: Type 2    Fair control. She was on high dose of basal insulin 1u/kg with frequent nocturnal hypoglycemia and we have been tapering it down. Her SMBG are only fasting. Declined CGM in the past. She is open to try higher dose jardiance.     Plan:   - continue same lantus and metofmrin. Increase jardiance to 25 mg daily.  - reviewed with her how to adjust lantus based on fasting BG  - recommend BG checks at least 2 times per day.     2. Diabetes Complications: as above      3. Blood Pressure Management: Blood pressure is controlled . Currently is  on pharmacotherapy for this.     4.Lipid Management: Per the new ACC/CHELSEA/NHLBI guidelines, statins are recommended for individuals with diabetes aged 40-75 with LDL  without ASCVD, and for any individual with ASCVD. Currently the patient is on a statin.      5. Smoking " Status: smoker    6. MNG: discussed recent US thyroid and indication for FNA. She prefers to continue to monitor for now. Open for FNA if size increase.    Review of the result(s) of each unique test - A1c and diabetes related labs.         Test and/or medications prescribed today:  No orders of the defined types were placed in this encounter.        Follow up: 3 month with jacqui or me    The longitudinal plan of care for the diagnosis(es)/condition(s) as documented were addressed during this visit. Due to the added complexity in care, I will continue to support Janine in the subsequent management and with ongoing continuity of care.        Staci Norman MD  Endocrinology, Diabetes and Metabolism  [place] [facility]    40 minutes spent on the date of the encounter doing chart review, history and exam, documentation and further activities per the note

## 2024-05-01 NOTE — LETTER
5/1/2024       RE: Janine Cornell  [address]  [facility] [address]     Dear Colleague,    Thank you for referring your patient, Janine Cornell, to the [facility] ENDOCRINOLOGY [facility]INIC [place] at [facility]. Please see a copy of my visit note below.    Outcome for 04/30/24 2:32 PM: Left Voicemail  to bring meter to appt.  CORNELIO Acosta  Appointment reminder phone call made to patient.           Endocrinology Clinic Visit     NAME:  Janine Cornell  PCP:  Kash Solano  MRN:  [id number]  Reason for Consult:  DM2 and MNG  Requesting Provider:  Kash Solano    Chief Complaint     Chief Complaint   Patient presents with    Endocrine Problem     Patient is requesting 6-12 months of refills instead of 3 for her Jardiance and Lantus Pens. In cronic pain.       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  I saw her on 10/2023, she saw Krissy Danielle 1/2024.      The patient was diagnosed with type 2 diabetes in 2010. She has been on jardiance, metformin and lantus for longtime. Same dose lantus for the past year. She used to have frequent nocturnal hypoglycemia. Lantus dose was adjusted.    Today she reported sx of low bg at least once a week. She was reluctant  about increasing her sglt2i last visit but open to it today.     Previous A1C in records reviewed.     Lab Results   Component Value Date    A1C 8.4 (H) 03/15/2024    A1C 8.3 (H) 10/18/2023    A1C 9.5 (H) 06/21/2023    A1C 8.5 (H) 11/23/2022    A1C 10.3 (H) 08/19/2022    A1C 11.2 (H) 05/14/2022    A1C 8.0 (H) 06/18/2020    A1C 9.2 (H) 03/06/2019    A1C 9.6 (H) 11/09/2018    A1C 8.4 (H) 11/15/2017    A1C 8.8 (H)  06/28/2017    HEMOGLOBINA1 8.3 10/18/2023    HEMOGLOBINA1 9.2 (A) 04/14/2014       Weight : stable   Wt Readings from Last 4 Encounters:   05/01/24 74.8 kg (165 lb)   03/15/24 75.6 kg (166 lb 11.2 oz)   03/13/24 76.2 kg (168 lb)   02/12/24 76.4 kg (168 lb 8 oz)         Diabetes Care/Complications:   Eyes: she follows regularly due to gpa. No DR.  Kidneys: last urine albumin 2/2024 negative  Nerves: numbness in both feet, prescribed lyrica 25 mg daily by her PCP, she did not start it yet. She also follows with podiatry.   Smoking: yes  Blood Pressure: controlled on meds  Lipids: on pravastatin , ldl 43 on 2/2022  Macrovascular: hx of nstmi  Hypoglycemia: fell low when less than 90.     Previous DM related Hospitalization:    No     Current treatment strategy:     jardiance 10 mg   metformin 2 g daily  Lantus 56 units daily       Blood Glucose Monitoring:   She checks it 1 times a day  Refer to MA note    Diet: 1-2 meals per day.   Snacks on fruits  Drinks: no alcohol. Sometimes suagry drinks.       --------------------------------------------------------------------------------------------------------------------------  # MNG  She was seen by endocrine back in 2012 for MNG and abnormal TFT which corrected to nromal with no intervention. in 2014 seen by Dr. Funk for follow up, has known fat pad and was screened negative for cushing.     Janine was diagnosed with a multinodular goiter in 2012. The biopsy of the dominant right and left thyroid nodules on 9/17/12 revealed atypia of undetermined significance for the right thyroid nodule and benign  for the left thyroid nodule.     I reviewed her thyroid US in records. Most recent US on 1/2023 was compared to previous one in 2018:   Dominant right thyroid nodule which was biopsied with AUS result, it grew in size over 5 years from 1.5 x 1.3 x 1.5 cm to  2.0 x 1.3 x 1.2 cm. Left dominant nodule which was benign on biopsy decreased in size. She had an increase in size in  one of the right thyroid nodules from  1.0 x 0.8 x 1.4 cm to  1.4 x 1.3 x 0.9 cm.    Last visit she reported she prefers to avoid FNA and would like to monitor.  Repeat US 3/2024 reviewed today multiple new subcentimeter nodules. 2 dominant nodules remains same size.    Social: she lives with her son, 26 y.o. she is unemployed.     Problem List     Patient Active Problem List   Diagnosis    Seasonal affective disorder (H24)    Allergic rhinitis    PCOS (polycystic ovarian syndrome)    Moderate persistent asthma    Chronic pancreatitis (H)    Hypertension goal BP (blood pressure) < 140/90    Common migraine    NSTEMI (non-ST elevated myocardial infarction) (H)    Hyperlipidemia LDL goal <70    ASCVD (arteriosclerotic cardiovascular disease)    GERD (gastroesophageal reflux disease)    Ischemic cardiomyopathy    Hypertensive heart disease    Uterine leiomyoma    Iron deficiency anemia    Costochondritis    Vitamin D deficiency    Breast cancer screening    Spinal stenosis in cervical region    Fibromyalgia    Seronegative rheumatoid arthritis (H)    Type 2 diabetes, HbA1C goal < 8% (H)    Type 2 diabetes mellitus with other specified complication (H)    Hyperlipidemia associated with type 2 diabetes mellitus (H)    Hypertension associated with diabetes (H)    Overweight with body mass index (BMI) 25.0-29.9    Tobacco use disorder    Telogen effluvium    CAD S/P percutaneous coronary angioplasty    Status post coronary angiogram    ANCA-associated vasculitis (H)    Wegener's vasculitis    Type 1 diabetes mellitus with complications (H)    Chest discomfort    Urinary frequency    Abdominal pain, epigastric    Hypokalemia    Leukocytosis, unspecified type    Acute pancreatitis, unspecified complication status, unspecified pancreatitis type        Medications     Current Outpatient Medications   Medication Sig Dispense Refill    acetaminophen (TYLENOL) 325 MG tablet Take 1-2 tablets (325-650 mg) by mouth every 6 hours as  needed for pain (headache) 250 tablet 4    ADVAIR DISKUS 250-50 MCG/ACT inhaler Inhale 1 puff into the lungs 2 times daily      albuterol (2.5 MG/3ML) 0.083% neb solution INL 1 VIAL VIA NEBULIZATION Q 4 TO 6 HOURS PRN  1    albuterol (PROAIR HFA, PROVENTIL HFA, VENTOLIN HFA) 108 (90 BASE) MCG/ACT inhaler Inhale 2 puffs into the lungs every 6 hours as needed for shortness of breath / dyspnea or wheezing 3 Inhaler 1    BANOPHEN 25 MG tablet Take 25 mg by mouth At Bedtime      blood glucose (NO BRAND SPECIFIED) lancets standard Use to test blood sugar 1-4 times daily or as directed. 400 each 3    blood glucose (NO BRAND SPECIFIED) test strip Use to test blood sugar fasting each am and predinner daily. 250 strip 3    blood glucose monitoring (NO BRAND SPECIFIED) meter device kit Use to test blood sugar 2 times daily or as directed. 1 kit 0    Blood Pressure Monitor KIT 1 each daily Monitor home blood pressure as instructed by physician.  Dispense O[place]on blood pressure kit. 1 kit 0    calcium carbonate (TUMS) 500 MG chewable tablet Take 3-4 tablets (1,500-2,000 mg) by mouth daily as needed 90 tablet 3    clopidogrel (PLAVIX) 75 MG tablet Take 1 tablet (75 mg) by mouth daily . 90 tablet 3    clotrimazole (MYCELEX) 10 MG lozenge Place 1 lozenge (10 mg) inside cheek 5 times daily 50 lozenge 1    cyclobenzaprine (FLEXERIL) 10 MG tablet Take 1 tablet (10 mg) by mouth 2 times daily as needed for muscle spasms 60 tablet 3    cycloSPORINE (RESTASIS) 0.05 % ophthalmic emulsion Place 1 drop into both eyes 2 times daily 5.5 mL 11    cycloSPORINE (RESTASIS) 0.05 % ophthalmic emulsion 1 month supply 11 refills 1 each 11    dicyclomine (BENTYL) 20 MG tablet TAKE 1 TABLET(20 MG) BY MOUTH FOUR TIMES DAILY AS NEEDED. 60 tablet 4    empagliflozin (JARDIANCE) 25 MG TABS tablet Take 1 tablet (25 mg) by mouth daily 90 tablet 2    EPIPEN 2-RIKY 0.3 MG/0.3ML injection INJECT 0.3 MG INTO THE MUSCLE PRF ANAPHYALAXIS  0    esomeprazole (NEXIUM) 40  MG DR capsule Take 1 capsule (40 mg) by mouth 2 times daily Take 30-60 minutes before eating. 180 capsule 0    estradiol (VAGIFEM) 10 MCG TABS vaginal tablet Place 1 tablet (10 mcg) vaginally twice a week 24 tablet 3    fluticasone (FLONASE) 50 MCG/ACT nasal spray Spray 1 spray in nostril as needed      folic acid (FOLVITE) 1 MG tablet Take 1 tablet (1 mg) by mouth daily 90 tablet 0    hydrocortisone (CORTAID) 1 % external cream Apply topically 2 times daily (Patient taking differently: Apply topically as needed) 60 g 1    insulin glargine (LANTUS PEN) 100 UNIT/ML pen Inject 56 Units Subcutaneous every morning Or as directed. 75 mL 1    insulin pen needle (BD PEN NEEDLE MARCK 2ND GEN) 32G X 4 MM miscellaneous Use one pen needle daily or as directed. 100 each 3    ketoconazole (NIZORAL) 2 % shampoo Apply topically daily as needed      levocetirizine (XYZAL) 5 MG tablet Take 5 mg by mouth as needed      lisinopril-hydrochlorothiazide (ZESTORETIC) 20-25 MG tablet Take 1 tablet by mouth daily 90 tablet 3    LORazepam (ATIVAN) 0.5 MG tablet Take 1 tablet 30-60 minutes before your scheduled MRI 1 tablet 0    magnesium 250 MG tablet TAKE 1 TABLET(250 MG) BY MOUTH TWICE DAILY 60 tablet 3    metFORMIN (GLUCOPHAGE XR) 500 MG 24 hr tablet Take 4 tablets (2,000 mg) by mouth daily (with dinner) 360 tablet 3    metoprolol succinate ER (TOPROL XL) 100 MG 24 hr tablet Take 1 tablet (100 mg) by mouth daily 90 tablet 3    Multiple Vitamin (TAB-A-GERALD) TABS Take 1 tablet by mouth daily 90 tablet 1    nitroGLYcerin (NITROSTAT) 0.4 MG sublingual tablet For chest pain place 1 tablet under the tongue every 5 minutes for 3 doses. If symptoms persist 5 minutes after 1st dose call 911. 30 tablet 11    nitroGLYcerin (NITROSTAT) 0.4 MG sublingual tablet Place 1 tablet (0.4 mg) under the tongue every 5 minutes as needed for chest pain . After 3 doses, if pain persists call 911. 25 tablet 3    olopatadine (PATANOL) 0.1 % ophthalmic solution Place  1 drop into both eyes as needed      ondansetron (ZOFRAN ODT) 8 MG ODT tab Take 1 tablet (8 mg) by mouth every 8 hours as needed for nausea 20 tablet 1    pravastatin (PRAVACHOL) 40 MG tablet Take 1 tablet (40 mg) by mouth daily 90 tablet 3    pregabalin (LYRICA) 25 MG capsule Take one po qd 30 capsule 1    prochlorperazine (COMPAZINE) 5 MG tablet Take 1 tablet (5 mg) by mouth every 6 hours as needed for nausea or vomiting 60 tablet 3    sennosides (SENOKOT) 8.6 MG tablet 1-2 tabs a day as needed for constipation 60 tablet 1    spironolactone (ALDACTONE) 25 MG tablet Take 1 tablet (25 mg) by mouth daily. May also take 0.5 tablets (12.5 mg) daily as needed (for weight gain). 45 tablet 9    sucralfate (CARAFATE) 1 GM tablet Take 1 tablet (1 g) by mouth 4 times daily 120 tablet 3    terbinafine (LAMISIL) 1 % external cream Apply topically 2 times daily (Patient taking differently: Apply topically as needed) 42 g 1    tiZANidine (ZANAFLEX) 4 MG tablet Take 1 tablet (4 mg) by mouth 3 times daily as needed for muscle spasms 21 tablet 3    traMADol (ULTRAM) 50 MG tablet TAKE 1 TABLET(50 MG) BY MOUTH EVERY 8 HOURS AS NEEDED FOR SEVERE PAIN 60 tablet 3    vitamin D3 (CHOLECALCIFEROL) 50 mcg (2000 units) tablet Take 1 tablet (50 mcg) by mouth daily 90 tablet 1    vitamin D3 (CHOLECALCIFEROL) 50 mcg (2000 units) tablet Take 1 tablet (50 mcg) by mouth daily 90 tablet 0     No current facility-administered medications for this visit.        Allergies     Allergies   Allergen Reactions    Amoxicillin-Pot Clavulanate      Unknown possible hives and edema    Amoxicillin-Pot Clavulanate     Artificial Sweetner (Informational Only)  Other (See Comments)     Increased headache    Azithromycin     Clavulanic Acid     Diatrizoate Other (See Comments)     Pt wants this listed because she is allergic to shellfish     Imitrex [Triptans]      Severe face/neck/chest tightness and flushing side effects     Penicillins Hives     Unknown      Pork Allergy      Stomach pain, cramping, diarrhea, itching, nausea and headaches    Shellfish Allergy Hives and Swelling    Shellfish-Derived Products     Sulfa Antibiotics Hives and Swelling    Sulfatolamide     Zithromax [Azithromycin Dihydrate] Swelling     Unknown        Medical / Surgical History     Past Medical History:   Diagnosis Date    Abnormal glandular Papanicolaou smear of cervix 1992    Allergic rhinitis     Allergy, airborne subst    Arthritis     ASCVD (arteriosclerotic cardiovascular disease)     Chronic pain     Chronic pancreatitis (H)     idiopathic, Tx: PPI, antioxidants, pancreatic enzymes    Common migraine     Coronary artery disease     Costochondritis     Difficulty in walking(719.7)     Dyspnea on exertion     Ectasia, mammary duct     [facility], persistent nipple discharge    Elevated fasting glucose     Gastro-oesophageal reflux disease     Granulomatosis with polyangiitis (H)     Hernia     History of angina     Hyperlipidemia LDL goal < 100     Hypertension goal BP (blood pressure) < 140/90     Essential hypertension    Iron deficiency anemia     Ischemic cardiomyopathy     Menorrhagia     Migraine headaches     Mild persistent asthma     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 Matias, neg w/u, Rx'd w/prednisone    Ventral  hernia, unspecified, without mention of obstruction or gangrene 2012     Past Surgical History:   Procedure Laterality Date    C/SECTION, LOW TRANSVERSE  1996        CARDIAC SURGERY      cardiac stent- recent in 16; 4 other stents    COLONOSCOPY N/A 2023    Procedure: COLONOSCOPY, WITH POLYPECTOMY;  Surgeon: Percy Gross MD;  Location: [facility] OR    DILATION AND CURETTAGE N/A 2016    Procedure: DILATION AND CURETTAGE;  Surgeon: Nahed Butler MD;  [facility]    ENDOMETRIAL SAMPLING (BIOPSY) N/A 2023    Procedure: ENDOMETRIAL BIOPSY;  Surgeon: Nahed Butler MD;  [facility]    ESOPHAGOSCOPY, GASTROSCOPY, DUODENOSCOPY (EGD), COMBINED N/A 2022    Procedure: ESOPHAGOGASTRODUODENOSCOPY, WITH BIOPSY;  Surgeon: Enoz Sesay MD;  Location: [facility]    ESOPHAGOSCOPY, GASTROSCOPY, DUODENOSCOPY (EGD), COMBINED N/A 2023    Procedure: ESOPHAGOGASTRODUODENOSCOPY, WITH BIOPSY;  Surgeon: Percy Gross MD;  Location: [facility] OR    EXAM UNDER ANESTHESIA PELVIC N/A 2023    Procedure: EXAM UNDER ANESTHESIA;  Surgeon: Nahed Butler MD;  [facility]    HC UGI ENDOSCOPY W EUS  2008    Dr. Pastrana, possible chronic pancreatitis, fatty liver    HERNIA REPAIR  2012    done at [facility]    INSERT INTRAUTERINE DEVICE N/A 2016    Procedure: INSERT INTRAUTERINE DEVICE;  Surgeon: Nahed Butler MD;  [facility]    INT UTERINE FIBRIOD EMBOLIZATION  10/29/2014    LAPAROSCOPIC CHOLECYSTECTOMY  2008    Dr. Arnol GRUBBS TX, CERVICAL  1992    s/p LEEP, done at [facility] in [place].    ORBITOTOMY Right 03/15/2016    Procedure: ORBITOTOMY;  Surgeon: Myron Cyr MD;  Location: [facility]    ORBITOTOMY Right 2017    Procedure: ORBITOTOMY;  RIGHT ORBITOTOMY AND BIOPSY;  Surgeon: Charis Holbrook MD;  Location: [facility]    REMOVE INTRAUTERINE DEVICE N/A 2023    Procedure: Remove intrauterine  device,;  Surgeon: Nahed Butler MD;  [facility]    REPAIR PTOSIS Right 2017    Procedure: REPAIR PTOSIS;  RIGHT UPPER LID PTOSIS REPAIR;  Surgeon: Myron Cyr MD;  Location: [facility]    UPPER GI ENDOSCOPY  10/21/2008    mild gastritis, Dr. Rocky [facility]A ECHO HEART XTHORACIC,COMPLETE, W/O DOPPLER  2004    [facility] WNL, EF 60%       Social History     Social History     Socioeconomic History    Marital status: Single     Spouse name: Not on file    Number of children: 1    Years of education: Not on file    Highest education level: Not on file   Occupational History     Employer: NONE    Tobacco Use    Smoking status: Some Days     Current packs/day: 0.00     Average packs/day: 0.2 packs/day for 1 year (0.2 ttl pk-yrs)     Types: Cigarettes     Start date: 2015     Last attempt to quit: 2016     Years since quittin.2    Smokeless tobacco: Never   Vaping Use    Vaping status: Every Day    Substances: Nicotine   Substance and Sexual Activity    Alcohol use: No     Alcohol/week: 0.0 standard drinks of alcohol    Drug use: No    Sexual activity: Not Currently   Other Topics Concern    Parent/sibling w/ CABG, MI or angioplasty before 65F 55M? Yes   Social History Narrative    Balanced Diet - sometimes    Osteoporosis Prevention Measures - Dairy servings per day: 2 servings weekly    Regular Exercise -  Yes Describe walking 4 times a week    Dental Exam - NO    Seatbelts used - Yes    Self Breast Exam - Yes    Abuse: Current or Past (Physical, Sexual or Emotional)- No    Do you have any concerns about STD's -  No    Do you feel safe in your environment - No    Guns stored in the home - No    Sunscreen used - Yes    Lipids -  YES - Date:     Glucose -  YES - Date:     Eye Exam - YES - Date: one year ago    Colon Cancer Screening - No    Hemoccults - NO    Pap Test -  YES - Date: , remote history of LEEP    Mammography - YES - Date: last spring, would like  to discuss, needs a referral to [facility]    DEXA - NO    Immunizations reviewed and up to date - Yes, last td given in 1997 or 1998     Social Determinants of Health     Financial Resource Strain: Low Risk  (2/9/2024)    Financial Resource Strain     Within the past 12 months, have you or your family members you live with been unable to get utilities (heat, electricity) when it was really needed?: No   Food Insecurity: High Risk (2/9/2024)    Food Insecurity     Within the past 12 months, did you worry that your food would run out before you got money to buy more?: Yes     Within the past 12 months, did the food you bought just not last and you didn t have money to get more?: No   Transportation Needs: Low Risk  (2/9/2024)    Transportation Needs     Within the past 12 months, has lack of transportation kept you from medical appointments, getting your medicines, non-medical meetings or appointments, work, or from getting things that you need?: No   Physical Activity: Not on file   Stress: Not on file   Social Connections: Not on file   Interpersonal Safety: Low Risk  (2/9/2024)    Interpersonal Safety     Do you feel physically and emotionally safe where you currently live?: Yes     Within the past 12 months, have you been hit, slapped, kicked or otherwise physically hurt by someone?: No     Within the past 12 months, have you been humiliated or emotionally abused in other ways by your partner or ex-partner?: No   Housing Stability: Low Risk  (2/9/2024)    Housing Stability     Do you have housing? : Yes     Are you worried about losing your housing?: No       Family History     Family History   Problem Relation Age of Onset    Hypertension Mother     Arthritis Mother     Heart Disease Mother         long qt syndrome    Thyroid Disease Mother     C.A.D. Mother     Heart Disease Father 50        heart attack    Cerebrovascular Disease Father     Diabetes Father     Hypertension Father     Depression  Father     C.A.D. Father     Neurologic Disorder Sister         migraines    Neurologic Disorder Sister         migraines    Heart Disease Brother 15        MI at 15, 16.     Arthritis Brother         he passed away, had severe arthritis at age 11    C.A.D. Brother     Anesthesia Reaction Son         PONV    Respiratory Son         asthma    Hypertension Maternal Aunt     Diabetes Maternal Uncle     Hypertension Maternal Uncle     Arthritis Maternal Uncle     Eye Disorder Maternal Uncle         cataracts    Cerebrovascular Disease Maternal Uncle     Family History Negative Other         neg for RA, SLE    Unknown/Adopted No family hx of         unknown neurological issues in her family, mother was adopted    Skin Cancer No family hx of         No known family hx of skin cancer    Deep Vein Thrombosis (DVT) No family hx of        ROS     12 ROS completed, pertinent positive and negative in HPI    Physical Exam   /82 (BP Location: Right arm)   Pulse 82   Wt 74.8 kg (165 lb)   LMP  (LMP Unknown)   SpO2 97%   BMI 29.23 kg/m       General: Comfortable, no obvious distress, normal body habitus  Eyes: Sclera anicteric, moist conjunctiva  HENT: Atraumatic, oropharynx clear, moist mucous membranes with no mucosal ulcerations  CV: normal rate.   Resp:  good effort, no evidence of loud wheezing  Abdomen:  obese, non distended.   Skin: No rashes, lesions, or subcutaneous nodules on exposed skin.   Psych: Alert and oriented x 3. Appropriate affect, good insight  Extremities: No peripheral edema  Musculoskeletal: Appropriate muscle bulk and strength  Neuro: Moves all four extremities. No focal deficits on limited exam. Gait normal.       Labs/Imaging     Pertinent Labs were reviewed and updated in EPIC and discussed briefly.  Radiology Results were  reviewed and updated in EPIC and discussed briefly.    Summary of recent findings:   Lab Results   Component Value Date    A1C 8.5 2022    A1C 10.3 2022  "   A1C 11.2 05/14/2022    A1C 10.8 02/04/2022    A1C 8.0 06/18/2020    A1C 9.2 03/06/2019    A1C 9.6 11/09/2018    A1C 8.4 11/15/2017    A1C 8.8 06/28/2017       TSH   Date Value Ref Range Status   01/19/2023 0.40 0.30 - 4.20 uIU/mL Final   05/14/2022 1.48 0.40 - 4.00 mU/L Final   02/04/2022 0.36 (L) 0.40 - 4.00 mU/L Final   03/06/2019 0.25 (L) 0.40 - 4.00 mU/L Final   11/15/2017 0.10 (L) 0.40 - 4.00 mU/L Final   12/26/2016 0.24 (L) 0.40 - 4.00 mU/L Final   08/25/2016 0.16 (L) 0.40 - 4.00 mU/L Final   10/30/2015 0.49 0.40 - 4.00 mU/L Final     T4 Free   Date Value Ref Range Status   03/06/2019 1.00 0.76 - 1.46 ng/dL Final   11/15/2017 1.04 0.76 - 1.46 ng/dL Final   12/26/2016 0.97 0.76 - 1.46 ng/dL Final   08/25/2016 1.06 0.76 - 1.46 ng/dL Final   10/30/2015 1.06 0.76 - 1.46 ng/dL Final     Free T4   Date Value Ref Range Status   02/04/2022 1.27 0.76 - 1.46 ng/dL Final       Creatinine   Date Value Ref Range Status   02/12/2024 1.07 (H) 0.51 - 0.95 mg/dL Final   05/28/2021 1.00 0.52 - 1.04 mg/dL Final       Recent Labs   Lab Test 02/08/22  0918 06/18/20  1500 01/27/16  1248 07/29/15  1340 02/04/15  1555   CHOL 109 102   < > 126 148   HDL 38* 30*   < > 36* 47*   LDL 43 50   < > 62 70   TRIG 141 104   < > 142 157*   CHOLHDLRATIO  --   --   --  3.5 3.1    < > = values in this interval not displayed.       No results found for: \"[id number]\", \"[id number]\", \"[id number]\"    I personally reviewed the patient's outside records from [facility] EMR and Care Everywhere. Summary of pertinent findings in HPI.    Impression / Plan     1. Diabetes Mellitus: Type 2    Fair control. She was on high dose of basal insulin 1u/kg with frequent nocturnal hypoglycemia and we have been tapering it down. Her SMBG are only fasting. Declined CGM in the past. She is open to try higher dose jardiance.     Plan:   - continue same lantus and metofmrin. Increase jardiance to 25 mg daily.  - reviewed with her how to adjust lantus based on fasting BG  - " recommend BG checks at least 2 times per day.     2. Diabetes Complications: as above      3. Blood Pressure Management: Blood pressure is controlled . Currently is  on pharmacotherapy for this.     4.Lipid Management: Per the new ACC/CHELSEA/NHLBI guidelines, statins are recommended for individuals with diabetes aged 40-75 with LDL  without ASCVD, and for any individual with ASCVD. Currently the patient is on a statin.      5. Smoking Status: smoker    6. MNG: discussed recent US thyroid and indication for FNA. She prefers to continue to monitor for now. Open for FNA if size increase.    Review of the result(s) of each unique test - A1c and diabetes related labs.         Test and/or medications prescribed today:  No orders of the defined types were placed in this encounter.        Follow up: 3 month with jacqui or me    The longitudinal plan of care for the diagnosis(es)/condition(s) as documented were addressed during this visit. Due to the added complexity in care, I will continue to support Janine in the subsequent management and with ongoing continuity of care.        Staci Norman MD  Endocrinology, Diabetes and Metabolism  [facility]    40 minutes spent on the date of the encounter doing chart review, history and exam, documentation and further activities per the note

## 2024-05-02 ENCOUNTER — OFFICE VISIT (OUTPATIENT)
Dept: OBGYN | Facility: CLINIC | Age: 59
End: 2024-05-02
Attending: OBSTETRICS & GYNECOLOGY
Payer: COMMERCIAL

## 2024-05-02 VITALS
HEART RATE: 80 BPM | BODY MASS INDEX: 29.23 KG/M2 | DIASTOLIC BLOOD PRESSURE: 78 MMHG | WEIGHT: 165 LBS | SYSTOLIC BLOOD PRESSURE: 124 MMHG

## 2024-05-02 DIAGNOSIS — Z01.419 ENCOUNTER FOR GYNECOLOGICAL EXAMINATION WITHOUT ABNORMAL FINDING: Primary | ICD-10-CM

## 2024-05-02 DIAGNOSIS — E11.8 DM (DIABETES MELLITUS), TYPE 2 WITH COMPLICATIONS (H): ICD-10-CM

## 2024-05-02 DIAGNOSIS — Z11.8 SPECIAL SCREENING EXAMINATION FOR CHLAMYDIAL DISEASE: ICD-10-CM

## 2024-05-02 PROCEDURE — G0463 HOSPITAL OUTPT CLINIC VISIT: HCPCS | Mod: 25 | Performed by: OBSTETRICS & GYNECOLOGY

## 2024-05-02 PROCEDURE — 87491 CHLMYD TRACH DNA AMP PROBE: CPT | Performed by: OBSTETRICS & GYNECOLOGY

## 2024-05-02 PROCEDURE — G0101 CA SCREEN;PELVIC/BREAST EXAM: HCPCS | Performed by: OBSTETRICS & GYNECOLOGY

## 2024-05-02 RX ORDER — ONDANSETRON 8 MG/1
8 TABLET, ORALLY DISINTEGRATING ORAL EVERY 8 HOURS PRN
Qty: 20 TABLET | Refills: 1 | Status: SHIPPED | OUTPATIENT
Start: 2024-05-02

## 2024-05-02 ASSESSMENT — PAIN SCALES - GENERAL: PAINLEVEL: NO PAIN (0)

## 2024-05-02 NOTE — LETTER
2024       RE: Janine Cornell  [address] [facility] [address]     Dear Colleague,    Thank you for referring your patient, Janine Cornell, to the [facility] [place] [facility]. Please see a copy of my visit note below.    Progress Note    SUBJECTIVE:  Janine Cornell is an 58 year old  , who requests a breast and pelvic exam.    Patient is followed by Dr. Solano for primary care.    Concerns today include: She has had no postmenopausal bleeding.  She is using vagifem tablets and this is going well.  Requests chlamydia screening today.    Menstrual History:      2015     3:04 PM 2015    10:20 AM 2016     6:57 AM 2016    11:05 AM 2017    12:52 PM   Menstrual History   LAST MENSTRUAL PERIOD 5/15/2015 2015 2016 2016 2017       Last    Lab Results   Component Value Date    PAP NILM 12/15/2022    PAP NIL 2016     History of abnormal Pap smear: None, given immunosuppression recommend pap smears every 3 years, with HPV co-testing.    Last   Lab Results   Component Value Date    HPV16 Negative 12/15/2022    HPV16 Negative 2016     Last   Lab Results   Component Value Date    HPV18 Negative 12/15/2022    HPV18 Negative 2016     Last   Lab Results   Component Value Date    HRHPV Negative 12/15/2022    HRHPV Negative 2016       Mammogram current: yes and Birads 1 24    HISTORY:  Prescription Medications as of 2024         Rx Number Disp Refills Start End Last Dispensed Date Next Fill Date Owning Pharmacy    acetaminophen (TYLENOL) 325 MG tablet  250 tablet 4 2021 --   [facility][id number] - [address] AT [address] [place] [address] & [facility]    Sig: Take 1-2 tablets (325-650 mg) by mouth every 6 hours as needed for pain (headache)    Class: E-Prescribe    Route: Oral    ADVAIR DISKUS 250-50 MCG/ACT inhaler  -- -- 2022 --       Sig:  Inhale 1 puff into the lungs 2 times daily    Class: Historical    Route: Inhalation    albuterol (2.5 MG/3ML) 0.083% neb solution  -- 1 10/21/2016 --       Sig: INL 1 VIAL VIA NEBULIZATION Q 4 TO 6 HOURS PRN    Class: Historical    albuterol (PROAIR HFA, PROVENTIL HFA, VENTOLIN HFA) 108 (90 BASE) MCG/ACT inhaler  3 Inhaler 1 10/6/2014 --   [id number] [place] [id number] AT [address] [place] [address]    Sig: Inhale 2 puffs into the lungs every 6 hours as needed for shortness of breath / dyspnea or wheezing    Class: E-Prescribe    Notes to Pharmacy: Profile Rx: patient will contact pharmacy when needed    Route: Inhalation    BANOPHEN 25 MG tablet  -- -- 10/29/2022 --       Sig: Take 25 mg by mouth At Bedtime    Class: Historical    Route: Oral    blood glucose (NO BRAND SPECIFIED) lancets standard  400 each 3 3/1/2022 --   [id number] [place] [id number] AT [address] [place] [address]    Sig: Use to test blood sugar 1-4 times daily or as directed.    Class: E-Prescribe    Notes to Pharmacy: Patient has supply. Just sending in update prescription.    blood glucose (NO BRAND SPECIFIED) test strip  250 strip 3 2024 --   [id number] [place] [address]    Sig: Use to test blood sugar fasting each am and predinner daily.    Class: E-Prescribe    blood glucose monitoring (NO BRAND SPECIFIED) meter device kit  1 kit 0 2024 --   [id number] [place] [address]    Sig: Use to test blood sugar 2 times daily or as directed.    Class: E-Prescribe    Blood Pressure Monitor KIT  1 kit 0 2019 --   [facility][id number] [facility] [address] [facility] [address] [place] [address]    Si each daily Monitor home blood pressure as instructed by physician.  Dispense [place] blood pressure kit.    Class: Local Print    Route: Does not apply    calcium carbonate (TUMS) 500 MG chewable  tablet  90 tablet 3 5/10/2021 --   [facility] #[id number] - [place], [place] - [address] AT [address]    Sig: Take 3-4 tablets (1,500-2,000 mg) by mouth daily as needed    Class: E-Prescribe    Route: Oral    clopidogrel (PLAVIX) 75 MG tablet  90 tablet 3 2023 --   [facility] #[id number] - [facility] [address] AT [address] & [facility]    Sig: Take 1 tablet (75 mg) by mouth daily .    Class: E-Prescribe    Route: Oral    clotrimazole (MYCELEX) 10 MG lozenge  50 lozenge 1 2023 --   [address]    Sig: Place 1 lozenge (10 mg) inside cheek 5 times daily    Class: E-Prescribe    Route: Buccal    cyclobenzaprine (FLEXERIL) 10 MG tablet  60 tablet 3 2023 --   [id number] [place] [address]    Sig: Take 1 tablet (10 mg) by mouth 2 times daily as needed for muscle spasms    Class: E-Prescribe    Route: Oral    cycloSPORINE (RESTASIS) 0.05 % ophthalmic emulsion  5.5 mL 11 3/29/2024 --   [id number] [place] [address]    Sig: Place 1 drop into both eyes 2 times daily    Class: E-Prescribe    Route: Both Eyes    Prior authorization: Approved    cycloSPORINE (RESTASIS) 0.05 % ophthalmic emulsion  1 each 11 2024 --   [id number] [place] [address]    Si month supply 11 refills    Class: E-Prescribe    Prior authorization: Closed    dicyclomine (BENTYL) 20 MG tablet  60 tablet 4 2023 --   [id number] [place] [address]    Sig: TAKE 1 TABLET(20 MG) BY MOUTH FOUR TIMES DAILY AS NEEDED.    Class: E-Prescribe    empagliflozin (JARDIANCE) 25 MG TABS tablet  90 tablet 2 2024 --   [id number] [place] [address]    Sig: Take 1 tablet (25 mg) by mouth daily    Class: E-Prescribe    Route: Oral    EPIPEN 2-RIKY 0.3 MG/0.3ML injection  --  0 9/22/2016 --       Sig: INJECT 0.3 MG INTO THE MUSCLE PRF ANAPHYALAXIS    Class: Historical    esomeprazole (NEXIUM) 40 MG DR capsule  180 capsule 0 12/21/2023 --   [facility] [address] [place] [address]    Sig: Take 1 capsule (40 mg) by mouth 2 times daily Take 30-60 minutes before eating.    Class: E-Prescribe    Route: Oral    estradiol (VAGIFEM) 10 MCG TABS vaginal tablet  24 tablet 3 4/1/2024 --   [facility] [address] [place] [address]    Sig: Place 1 tablet (10 mcg) vaginally twice a week    Class: E-Prescribe    Route: Vaginal    fluticasone (FLONASE) 50 MCG/ACT nasal spray  -- -- 8/3/2022 --   [facility][id number] [facility] [address] AT [address] [place] [address]    Sig: Spray 1 spray in nostril as needed    Class: Historical    Route: Nasal    folic acid (FOLVITE) 1 MG tablet  90 tablet 0 12/7/2023 --   [facility] [address] [place] [address]    Sig: Take 1 tablet (1 mg) by mouth daily    Class: E-Prescribe    Route: Oral    hydrocortisone (CORTAID) 1 % external cream  60 g 1 2/10/2023 --   [facility] [address]    Sig: Apply topically 2 times daily    Class: E-Prescribe    Route: Topical    insulin glargine (LANTUS PEN) 100 UNIT/ML pen  75 mL 1 5/1/2024 --   [facility] [address] [place] [address]    Sig: Inject 56 Units Subcutaneous every morning Or as directed.    Class: E-Prescribe    Notes to Pharmacy: If Lantus is not covered by insurance, may substitute Basaglar or Semglee or other insulin glargine product per insurance preference at same dose and frequency.      Route: Subcutaneous    insulin pen needle (BD PEN NEEDLE MARCK 2ND GEN) 32G X 4 MM miscellaneous  100 each 3 3/28/2023 --   [facility] #[id number] [facility] [address] AT [address] [place] [address]    Sig: Use one pen needle daily or as directed.    Class:  E-Prescribe    ketoconazole (NIZORAL) 2 % shampoo  -- --  --       Sig: Apply topically daily as needed    Class: Historical    Route: Topical    levocetirizine (XYZAL) 5 MG tablet  -- -- 8/3/2022 --       Sig: Take 5 mg by mouth as needed    Class: Historical    Route: Oral    lisinopril-hydrochlorothiazide (ZESTORETIC) 20-25 MG tablet  90 tablet 3 6/29/2023 --   [facility][id number] [address] AT [address] [place] [address]    Sig: Take 1 tablet by mouth daily    Class: E-Prescribe    Route: Oral    LORazepam (ATIVAN) 0.5 MG tablet  1 tablet 0 1/17/2024 --   [id number] [place] [address]    Sig: Take 1 tablet 30-60 minutes before your scheduled MRI    Class: E-Prescribe    magnesium 250 MG tablet  60 tablet 3 12/7/2023 --   [facility] [place] [address]    Sig: TAKE 1 TABLET(250 MG) BY MOUTH TWICE DAILY    Class: E-Prescribe    metFORMIN (GLUCOPHAGE XR) 500 MG 24 hr tablet  360 tablet 3 5/1/2024 --   [id number] [place] [address]    Sig: Take 4 tablets (2,000 mg) by mouth daily (with dinner)    Class: E-Prescribe    Route: Oral    metoprolol succinate ER (TOPROL XL) 100 MG 24 hr tablet  90 tablet 3 6/29/2023 --   [facility] #[id number] [address] AT [address] [place] [address]    Sig: Take 1 tablet (100 mg) by mouth daily    Class: E-Prescribe    Route: Oral    Multiple Vitamin (TAB-A-GERALD) TABS  90 tablet 1 12/21/2023 --   [facility] [place] [address]    Sig: Take 1 tablet by mouth daily    Class: E-Prescribe    Route: Oral    nitroGLYcerin (NITROSTAT) 0.4 MG sublingual tablet  30 tablet 11 12/14/2023 --   [facility] [place] [address]    Sig: For chest pain place 1 tablet under the tongue every 5 minutes for 3 doses. If symptoms persist 5 minutes after 1st dose call 911.    Class: E-Prescribe     nitroGLYcerin (NITROSTAT) 0.4 MG sublingual tablet  25 tablet 3 2022 --   [facility] #[id number] [facility] [address] AT [address] [place] [address]    Sig: Place 1 tablet (0.4 mg) under the tongue every 5 minutes as needed for chest pain . After 3 doses, if pain persists call 911.    Class: E-Prescribe    Route: Sublingual    olopatadine (PATANOL) 0.1 % ophthalmic solution  -- -- 8/3/2022 --   [facility] #[id number] [facility] [address] AT [address] [place] [address]    Sig: Place 1 drop into both eyes as needed    Class: Historical    Route: Both Eyes    ondansetron (ZOFRAN ODT) 8 MG ODT tab  20 tablet 1 2024 --   [facility] [address] [place] [address]    Sig: TAKE 1 TABLET BY MOUTH EVERY 8 HOURS AS NEEDED FOR NAUSEA    Class: E-Prescribe    Route: Oral    pravastatin (PRAVACHOL) 40 MG tablet  90 tablet 3 2023 --   [facility] #[id number] [facility] [address] AT [address] [place] [address]    Sig: Take 1 tablet (40 mg) by mouth daily    Class: E-Prescribe    Route: Oral    pregabalin (LYRICA) 25 MG capsule  30 capsule 1 2024 --   [facility] [place] [address]    Sig: Take one po qd    Class: E-Prescribe    prochlorperazine (COMPAZINE) 5 MG tablet  60 tablet 3 2024 --   [facility] [place] [address]    Sig: Take 1 tablet (5 mg) by mouth every 6 hours as needed for nausea or vomiting    Class: E-Prescribe    Route: Oral    sennosides (SENOKOT) 8.6 MG tablet  60 tablet 1 2021 --   [facility] #[id number] - [facility] [address]    Si-2 tabs a day as needed for constipation    Class: E-Prescribe    spironolactone (ALDACTONE) 25 MG tablet  45 tablet 9 2023 --   [facility] #[id number] - [place], [place] - [address] AT [facility] OF  & [address]    Sig: Take 1 tablet (25 mg) by  mouth daily. May also take 0.5 tablets (12.5 mg) daily as needed (for weight gain).    Class: E-Prescribe    Route: Oral    sucralfate (CARAFATE) 1 GM tablet  120 tablet 3 12/21/2023 --   [facility] [place] [address]    Sig: Take 1 tablet (1 g) by mouth 4 times daily    Class: E-Prescribe    Route: Oral    terbinafine (LAMISIL) 1 % external cream  42 g 1 2/11/2022 --   [facility] [address]    Sig: Apply topically 2 times daily    Class: E-Prescribe    Notes to Pharmacy: Please deliver to home address.    Route: Topical    tiZANidine (ZANAFLEX) 4 MG tablet  21 tablet 3 9/6/2023 --   [facility] #[id number] - [facility] [address] AT [facility] OF [address]    Sig: Take 1 tablet (4 mg) by mouth 3 times daily as needed for muscle spasms    Class: E-Prescribe    Route: Oral    traMADol (ULTRAM) 50 MG tablet  60 tablet 3 12/9/2023 --   [facility] [place] [address]    Sig: TAKE 1 TABLET(50 MG) BY MOUTH EVERY 8 HOURS AS NEEDED FOR SEVERE PAIN    Class: E-Prescribe    Prior authorization: Closed    vitamin D3 (CHOLECALCIFEROL) 50 mcg (2000 units) tablet  90 tablet 1 2/7/2024 --   [facility] [place] [address]    Sig: Take 1 tablet (50 mcg) by mouth daily    Class: E-Prescribe    Notes to Pharmacy: This replaces the 50,000 tablet per Dr Mckinnon    Route: Oral    vitamin D3 (CHOLECALCIFEROL) 50 mcg (2000 units) tablet  90 tablet 0 1/24/2024 --   [facility] [place] [address]    Sig: Take 1 tablet (50 mcg) by mouth daily    Class: E-Prescribe    Notes to Pharmacy: Change in dose per Dr Mckinnon    Route: Oral          Allergies   Allergen Reactions    Amoxicillin-Pot Clavulanate      Unknown possible hives and edema    Amoxicillin-Pot Clavulanate     Artificial Sweetner (Informational Only)  Other (See Comments)     Increased headache     Azithromycin     Clavulanic Acid     Diatrizoate Other (See Comments)     Pt wants this listed because she is allergic to shellfish     Imitrex [Triptans]      Severe face/neck/chest tightness and flushing side effects     Penicillins Hives     Unknown     Pork Allergy      Stomach pain, cramping, diarrhea, itching, nausea and headaches    Shellfish Allergy Hives and Swelling    Shellfish-Derived Products     Sulfa Antibiotics Hives and Swelling    Sulfatolamide     Zithromax [Azithromycin Dihydrate] Swelling     Unknown      Immunization History   Administered Date(s) Administered    COVID-19 Monovalent 12+ (Pfizer ) 2022, 2022    Flu, Unspecified 1994    Influenza (IIV3) PF 2001, 2005, 2006, 2008, 10/13/2008, 10/21/2011, 2012, 10/10/2013, 10/14/2016    Influenza Vaccine >6 months,quad, PF 10/06/2014, 2015, 10/04/2017, 2018, 2022    Pneumo Conj 13-V (2010&after) 2016    TDAP Vaccine (Adacel) 2006    TDAP Vaccine (Boostrix) 2016       OB History    Para Term  AB Living   4 1 1 0 3 1   SAB IAB Ectopic Multiple Live Births   0 3 0 0 1     Past Medical History:   Diagnosis Date    Abnormal glandular Papanicolaou smear of cervix     Allergic rhinitis     Allergy, airborne subst    Arthritis     ASCVD (arteriosclerotic cardiovascular disease)     Chronic pain     Chronic pancreatitis (H)     idiopathic, Tx: PPI, antioxidants, pancreatic enzymes    Common migraine     Coronary artery disease     Costochondritis     Difficulty in walking(719.7)     Dyspnea on exertion     Ectasia, mammary duct     [facility], persistent nipple discharge    Elevated fasting glucose     Gastro-oesophageal reflux disease     Granulomatosis with polyangiitis (H)     Hernia     History of angina     Hyperlipidemia LDL goal < 100     Hypertension goal BP (blood pressure) < 140/90     Essential hypertension    Iron  deficiency anemia     Ischemic cardiomyopathy     Menorrhagia     Migraine headaches     Mild persistent asthma     Neuritis optic     subacute autoimmune retrobulbar neuritis, Dr. 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 Matias, neg w/u, Rx'd w/prednisone    Ventral hernia, unspecified, without mention of obstruction or gangrene 2012     Past Surgical History:   Procedure Laterality Date    C/SECTION, LOW TRANSVERSE  1996        CARDIAC SURGERY      cardiac stent- recent in 16; 4 other stents    COLONOSCOPY N/A 2023    Procedure: COLONOSCOPY, WITH POLYPECTOMY;  Surgeon: Percy Gross MD;  Location: [facility] OR    DILATION AND CURETTAGE N/A 2016    Procedure: DILATION AND CURETTAGE;  Surgeon: Nahed Butler MD;  [facility]    ENDOMETRIAL SAMPLING (BIOPSY) N/A 2023    Procedure: ENDOMETRIAL BIOPSY;  Surgeon: Nahed Butler MD;  [facility]    ESOPHAGOSCOPY, GASTROSCOPY, DUODENOSCOPY (EGD), COMBINED N/A 2022    Procedure: ESOPHAGOGASTRODUODENOSCOPY, WITH BIOPSY;  Surgeon: Enzo Sesay MD;  [facility]    ESOPHAGOSCOPY, GASTROSCOPY, DUODENOSCOPY (EGD), COMBINED N/A 2023    Procedure: ESOPHAGOGASTRODUODENOSCOPY, WITH BIOPSY;  Surgeon: Percy Gross MD;  Location: [facility] OR    EXAM UNDER ANESTHESIA PELVIC N/A 2023    Procedure: EXAM  UNDER ANESTHESIA;  Surgeon: Nahed Butler MD;  [facility]    [facility] UGI ENDOSCOPY W EUS  2008    Dr. Pastrana, possible chronic pancreatitis, fatty liver    HERNIA REPAIR  2012    done at [facility]    INSERT INTRAUTERINE DEVICE N/A 2016    Procedure: INSERT INTRAUTERINE DEVICE;  Surgeon: Nahed Butler MD;  [facility]    INT UTERINE FIBRIOD EMBOLIZATION  10/29/2014    LAPAROSCOPIC CHOLECYSTECTOMY  2008    Dr. Arnol GRUBBS TX, CERVICAL  1992    s/p HUMERA, done at [facility] in [place].    ORBITOTOMY Right 03/15/2016    Procedure: ORBITOTOMY;  Surgeon: Myron Cyr MD;  [facility]    ORBITOTOMY Right 2017    Procedure: ORBITOTOMY;  RIGHT ORBITOTOMY AND BIOPSY;  Surgeon: Charis Holbrook MD;  Location: [facility]    REMOVE INTRAUTERINE DEVICE N/A 2023    Procedure: Remove intrauterine device,;  Surgeon: Nahed Butler MD;  [facility]    REPAIR PTOSIS Right 2017    Procedure: REPAIR PTOSIS;  RIGHT UPPER LID PTOSIS REPAIR;  Surgeon: Myron Cyr MD;  Location:  [facility]    UPPER GI ENDOSCOPY  10/21/2008    mild gastritis, Dr. Rocky CALDERA ECHO HEART XTHORACIC,COMPLETE, W/O DOPPLER  2004    [facility] WNL, EF 60%     Family History   Problem Relation Age of Onset    Hypertension Mother     Arthritis Mother     Heart Disease Mother         long qt syndrome    Thyroid Disease Mother     C.A.D. Mother     Heart Disease Father 50        heart attack    Cerebrovascular Disease Father     Diabetes Father     Hypertension Father     Depression Father     C.A.D. Father     Neurologic Disorder Sister         migraines    Neurologic Disorder Sister         migraines    Heart Disease Brother 15        MI at 15, 16.     Arthritis Brother         he passed away, had severe arthritis at age 11    C.A.D. Brother     Anesthesia Reaction Son         PONV    Respiratory Son         asthma    Hypertension Maternal Aunt     Diabetes  Maternal Uncle     Hypertension Maternal Uncle     Arthritis Maternal Uncle     Eye Disorder Maternal Uncle         cataracts    Cerebrovascular Disease Maternal Uncle     Family History Negative Other         neg for RA, SLE    Unknown/Adopted No family hx of         unknown neurological issues in her family, mother was adopted    Skin Cancer No family hx of         No known family hx of skin cancer    Deep Vein Thrombosis (DVT) No family hx of      Social History     Socioeconomic History    Marital status: Single     Spouse name: None    Number of children: 1    Years of education: None    Highest education level: None   Occupational History     Employer: NONE    Tobacco Use    Smoking status: Some Days     Current packs/day: 0.00     Average packs/day: 0.2 packs/day for 1 year (0.2 ttl pk-yrs)     Types: Cigarettes     Start date: 2015     Last attempt to quit: 2016     Years since quittin.2    Smokeless tobacco: Never   Vaping Use    Vaping status: Every Day    Substances: Nicotine   Substance and Sexual Activity    Alcohol use: No     Alcohol/week: 0.0 standard drinks of alcohol    Drug use: No    Sexual activity: Not Currently   Other Topics Concern    Parent/sibling w/ CABG, MI or angioplasty before 65F 55M? Yes   Social History Narrative    Balanced Diet - sometimes    Osteoporosis Prevention Measures - Dairy servings per day: 2 servings weekly    Regular Exercise -  Yes Describe walking 4 times a week    Dental Exam - NO    Seatbelts used - Yes    Self Breast Exam - Yes    Abuse: Current or Past (Physical, Sexual or Emotional)- No    Do you have any concerns about STD's -  No    Do you feel safe in your environment - No    Guns stored in the home - No    Sunscreen used - Yes    Lipids -  YES - Date:     Glucose -  YES - Date:     Eye Exam - YES - Date: one year ago    Colon Cancer Screening - No    Hemoccults - NO    Pap Test -  YES - Date: , remote history of LEEP     Mammography - YES - Date: last spring, would like to discuss, needs a referral to [facility] center    DEXA - NO    Immunizations reviewed and up to date - Yes, last td given in 1997 or 1998     Social Determinants of Health     Financial Resource Strain: Low Risk  (2/9/2024)    Financial Resource Strain     Within the past 12 months, have you or your family members you live with been unable to get utilities (heat, electricity) when it was really needed?: No   Food Insecurity: High Risk (2/9/2024)    Food Insecurity     Within the past 12 months, did you worry that your food would run out before you got money to buy more?: Yes     Within the past 12 months, did the food you bought just not last and you didn t have money to get more?: No   Transportation Needs: Low Risk  (2/9/2024)    Transportation Needs     Within the past 12 months, has lack of transportation kept you from medical appointments, getting your medicines, non-medical meetings or appointments, work, or from getting things that you need?: No   Interpersonal Safety: Low Risk  (2/9/2024)    Interpersonal Safety     Do you feel physically and emotionally safe where you currently live?: Yes     Within the past 12 months, have you been hit, slapped, kicked or otherwise physically hurt by someone?: No     Within the past 12 months, have you been humiliated or emotionally abused in other ways by your partner or ex-partner?: No   Housing Stability: Low Risk  (2/9/2024)    Housing Stability     Do you have housing? : Yes     Are you worried about losing your housing?: No       ROS    EXAM:  Blood pressure 124/78, pulse 80, weight 74.8 kg (165 lb), not currently breastfeeding. Body mass index is 29.23 kg/m .  General appearance: Pleasant female in no acute distress.     BREAST EXAM:  Breast: Without visible skin changes. No dimpling or lesions seen.   Breasts supple, non-tender with palpation, no dominant mass, nodularity, or nipple discharge noted  bilaterally. Axillary nodes negative.      PELVIC EXAM:  EG/BUS: Normal genital architecture without lesions, erythema or abnormal secretions Bartholin's, Urethra, [place]'s normal   Urethral meatus: normal    Urethra: no masses, tenderness, or scarring    Bladder: no masses or tenderness    Vagina: mildly atrophic, moist, with pink mucosa with creamy, white, and odorless  secretions  Cervix: no lesions and pink, moist, closed, without lesion or CMT  Uterus: anteverted,  and seems normal in size.  Previously globular, 10 week size  Adnexa: Within normal limits and No masses, nodularity, tenderness  Rectum:anus normal       ASSESSMENT:  Encounter Diagnoses   Name Primary?    Encounter for gynecological examination without abnormal finding Yes    Special screening examination for chlamydial disease     DM (diabetes mellitus), type 2 with complications (H)       58 year old Female Pelvic and Breast Exam  Vaginal estrogen loss on Vagifem with good results.    PLAN:   Orders Placed This Encounter   Procedures    Pelvic and Breast Exam Procedure []    Chlamydia trachomatis PCR (Clinic Collect)     Return 2025 for next pap smear  Results via letter if normal, otherwise phone call.  Return in one year/PRN for preventive care or problems/concerns.     Verbalized understanding and agreement with visit plan.    Nahed Butler MD

## 2024-05-02 NOTE — LETTER
May 3, 2024      Janine Cornell  [address] [facility] [address]        Dear ,    We are writing to inform you of your test results.    It was so nice to see you yesterday.  Your chlamydia test was negative.  Take care.    Resulted Orders   Chlamydia trachomatis PCR (Clinic Collect)   Result Value Ref Range    Chlamydia trachomatis Negative Negative      Comment:      A negative result by transcription mediated amplification does not preclude the presence of C. trachomatis infection because results are dependent on proper and adequate collection, absence of inhibitors and sufficient rRNA to be detected.       If you have any questions or concerns, please call the clinic at the number listed above.       Sincerely,      Nahed Butler MD

## 2024-05-02 NOTE — TELEPHONE ENCOUNTER
LVD:3 /15/2024  [facility] Kash River MD  Family Medicine Thyroid nodule     Refilled per protocol.

## 2024-05-02 NOTE — PROGRESS NOTES
Progress Note    SUBJECTIVE:  Janine Cornell is an 58 year old  , who requests a breast and pelvic exam.    Patient is followed by Dr. Solano for primary care.    Concerns today include: She has had no postmenopausal bleeding.  She is using vagifem tablets and this is going well.  Requests chlamydia screening today.    Menstrual History:      2015     3:04 PM 2015    10:20 AM 2016     6:57 AM 2016    11:05 AM 2017    12:52 PM   Menstrual History   LAST MENSTRUAL PERIOD 5/15/2015 2015 2016 2016 2017       Last    Lab Results   Component Value Date    PAP NILM 12/15/2022    PAP NIL 2016     History of abnormal Pap smear: None, given immunosuppression recommend pap smears every 3 years, with HPV co-testing.    Last   Lab Results   Component Value Date    HPV16 Negative 12/15/2022    HPV16 Negative 2016     Last   Lab Results   Component Value Date    HPV18 Negative 12/15/2022    HPV18 Negative 2016     Last   Lab Results   Component Value Date    HRHPV Negative 12/15/2022    HRHPV Negative 2016       Mammogram current: yes and Birads 1 24    HISTORY:  Prescription Medications as of 2024         Rx Number Disp Refills Start End Last Dispensed Date Next Fill Date Owning Pharmacy    acetaminophen (TYLENOL) 325 MG tablet  250 tablet 4 2021 --   [facility][id number] - [address]    Sig: Take 1-2 tablets (325-650 mg) by mouth every 6 hours as needed for pain (headache)    Class: E-Prescribe    Route: Oral    ADVAIR DISKUS 250-50 MCG/ACT inhaler  -- -- 2022 --       Sig: Inhale 1 puff into the lungs 2 times daily    Class: Historical    Route: Inhalation    albuterol (2.5 MG/3ML) 0.083% neb solution  -- 1 10/21/2016 --       Sig: INL 1 VIAL VIA NEBULIZATION Q 4 TO 6 HOURS PRN    Class: Historical    albuterol (PROAIR HFA, PROVENTIL HFA, VENTOLIN HFA) 108 (90 BASE) MCG/ACT  inhaler  3 Inhaler 1 10/6/2014 --   [facility] #[id number] [facility] [address] AT [address] [place] [address]    Sig: Inhale 2 puffs into the lungs every 6 hours as needed for shortness of breath / dyspnea or wheezing    Class: E-Prescribe    Notes to Pharmacy: Profile Rx: patient will contact pharmacy when needed    Route: Inhalation    BANOPHEN 25 MG tablet  -- -- 10/29/2022 --       Sig: Take 25 mg by mouth At Bedtime    Class: Historical    Route: Oral    blood glucose (NO BRAND SPECIFIED) lancets standard  400 each 3 3/1/2022 --   [facility] #[id number] [facility] [address] AT [address] [place] [address]    Sig: Use to test blood sugar 1-4 times daily or as directed.    Class: E-Prescribe    Notes to Pharmacy: Patient has supply. Just sending in update prescription.    blood glucose (NO BRAND SPECIFIED) test strip  250 strip 3 2024 --   [id number] [place] [address]    Sig: Use to test blood sugar fasting each am and predinner daily.    Class: E-Prescribe    blood glucose monitoring (NO BRAND SPECIFIED) meter device kit  1 kit 0 2024 --   [id number] [place] [address]    Sig: Use to test blood sugar 2 times daily or as directed.    Class: E-Prescribe    Blood Pressure Monitor KIT  1 kit 0 2019 --   [id number] [place], [place] [facility] [address] [facility] [address] [place] [address]    Si each daily Monitor home blood pressure as instructed by physician.  Dispense [place] blood pressure kit.    Class: Local Print    Route: Does not apply    calcium carbonate (TUMS) 500 MG chewable tablet  90 tablet 3 5/10/2021 --   [facility][id number] [place], [place] - [address] [facility] AT [address] [place] [address] [address] [place]    Sig: Take 3-4 tablets (1,500-2,000 mg) by mouth daily as needed    Class: E-Prescribe    Route: Oral    clopidogrel (PLAVIX) 75 MG tablet  90 tablet 3 2023 --    [id number] - [facility] [address] AT [address] [place] [address] & [facility]    Sig: Take 1 tablet (75 mg) by mouth daily .    Class: E-Prescribe    Route: Oral    clotrimazole (MYCELEX) 10 MG lozenge  50 lozenge 1 2023 --   [facility] [address] [place] [address] [address]    Sig: Place 1 lozenge (10 mg) inside cheek 5 times daily    Class: E-Prescribe    Route: Buccal    cyclobenzaprine (FLEXERIL) 10 MG tablet  60 tablet 3 2023 --   [facility] [place] [address]    Sig: Take 1 tablet (10 mg) by mouth 2 times daily as needed for muscle spasms    Class: E-Prescribe    Route: Oral    cycloSPORINE (RESTASIS) 0.05 % ophthalmic emulsion  5.5 mL 11 3/29/2024 --   [id number] [place] [address]    Sig: Place 1 drop into both eyes 2 times daily    Class: E-Prescribe    Route: Both Eyes    Prior authorization: Approved    cycloSPORINE (RESTASIS) 0.05 % ophthalmic emulsion  1 each 11 2024 --   [id number] [place] [address]    Si month supply 11 refills    Class: E-Prescribe    Prior authorization: Closed    dicyclomine (BENTYL) 20 MG tablet  60 tablet 4 2023 --   [facility] [place] [address]    Sig: TAKE 1 TABLET(20 MG) BY MOUTH FOUR TIMES DAILY AS NEEDED.    Class: E-Prescribe    empagliflozin (JARDIANCE) 25 MG TABS tablet  90 tablet 2 2024 --   [facility] [place] [address]    Sig: Take 1 tablet (25 mg) by mouth daily    Class: E-Prescribe    Route: Oral    EPIPEN 2-RIKY 0.3 MG/0.3ML injection  -- 0 2016 --       Sig: INJECT 0.3 MG INTO THE MUSCLE PRF ANAPHYALAXIS    Class: Historical    esomeprazole (NEXIUM) 40 MG DR capsule  180 capsule 0 2023 --   [facility] [place] [address]    Sig: Take 1 capsule (40 mg) by mouth 2 times daily Take 30-60 minutes before  eating.    Class: E-Prescribe    Route: Oral    estradiol (VAGIFEM) 10 MCG TABS vaginal tablet  24 tablet 3 4/1/2024 --   [facility] [address] [place] [address]    Sig: Place 1 tablet (10 mcg) vaginally twice a week    Class: E-Prescribe    Route: Vaginal    fluticasone (FLONASE) 50 MCG/ACT nasal spray  -- -- 8/3/2022 --   [facility] #[id number] [place] [id number] AT [address] [place] [address]    Sig: Spray 1 spray in nostril as needed    Class: Historical    Route: Nasal    folic acid (FOLVITE) 1 MG tablet  90 tablet 0 12/7/2023 --   [facility] [place] [address]    Sig: Take 1 tablet (1 mg) by mouth daily    Class: E-Prescribe    Route: Oral    hydrocortisone (CORTAID) 1 % external cream  60 g 1 2/10/2023 --   [address]    Sig: Apply topically 2 times daily    Class: E-Prescribe    Route: Topical    insulin glargine (LANTUS PEN) 100 UNIT/ML pen  75 mL 1 5/1/2024 --   [facility] [place] [address]    Sig: Inject 56 Units Subcutaneous every morning Or as directed.    Class: E-Prescribe    Notes to Pharmacy: If Lantus is not covered by insurance, may substitute Basaglar or Semglee or other insulin glargine product per insurance preference at same dose and frequency.      Route: Subcutaneous    insulin pen needle (BD PEN NEEDLE MARCK 2ND GEN) 32G X 4 MM miscellaneous  100 each 3 3/28/2023 --   [facility] #[id number] [place], [place] - [address] AT [address] [place] [address]    Sig: Use one pen needle daily or as directed.    Class: E-Prescribe    ketoconazole (NIZORAL) 2 % shampoo  -- --  --       Sig: Apply topically daily as needed    Class: Historical    Route: Topical    levocetirizine (XYZAL) 5 MG tablet  -- -- 8/3/2022 --       Sig: Take 5 mg by mouth as needed    Class: Historical    Route: Oral     lisinopril-hydrochlorothiazide (ZESTORETIC) 20-25 MG tablet  90 tablet 3 6/29/2023 --   [facility] #[id number] [facility] [address] AT [address] [place] [address]    Sig: Take 1 tablet by mouth daily    Class: E-Prescribe    Route: Oral    LORazepam (ATIVAN) 0.5 MG tablet  1 tablet 0 1/17/2024 --   [facility] [place] [address]    Sig: Take 1 tablet 30-60 minutes before your scheduled MRI    Class: E-Prescribe    magnesium 250 MG tablet  60 tablet 3 12/7/2023 --   [facility] [place] [address]    Sig: TAKE 1 TABLET(250 MG) BY MOUTH TWICE DAILY    Class: E-Prescribe    metFORMIN (GLUCOPHAGE XR) 500 MG 24 hr tablet  360 tablet 3 5/1/2024 --   [facility] [place] [address]    Sig: Take 4 tablets (2,000 mg) by mouth daily (with dinner)    Class: E-Prescribe    Route: Oral    metoprolol succinate ER (TOPROL XL) 100 MG 24 hr tablet  90 tablet 3 6/29/2023 --   [facility] #[id number] [facility] [address] AT [address] [place] [address]    Sig: Take 1 tablet (100 mg) by mouth daily    Class: E-Prescribe    Route: Oral    Multiple Vitamin (TAB-A-GERALD) TABS  90 tablet 1 12/21/2023 --   [facility] [place] [address]    Sig: Take 1 tablet by mouth daily    Class: E-Prescribe    Route: Oral    nitroGLYcerin (NITROSTAT) 0.4 MG sublingual tablet  30 tablet 11 12/14/2023 --   [facility] [place] [address]    Sig: For chest pain place 1 tablet under the tongue every 5 minutes for 3 doses. If symptoms persist 5 minutes after 1st dose call 911.    Class: E-Prescribe    nitroGLYcerin (NITROSTAT) 0.4 MG sublingual tablet  25 tablet 3 7/7/2022 --   [facility] #[id number] [facility] [address] AT [address] [place] [address]    Sig: Place 1 tablet (0.4 mg) under the tongue every 5 minutes as needed for chest pain . After 3 doses, if  pain persists call 911.    Class: E-Prescribe    Route: Sublingual    olopatadine (PATANOL) 0.1 % ophthalmic solution  -- -- 8/3/2022 --   [facility][id number] - [facility] [address] [facility] [address] [facility] AT [address] [place] [address] & [facility]    Sig: Place 1 drop into both eyes as needed    Class: Historical    Route: Both Eyes    ondansetron (ZOFRAN ODT) 8 MG ODT tab  20 tablet 1 2024 --   [place] [id number] [place] [address]    Sig: TAKE 1 TABLET BY MOUTH EVERY 8 HOURS AS NEEDED FOR NAUSEA    Class: E-Prescribe    Route: Oral    pravastatin (PRAVACHOL) 40 MG tablet  90 tablet 3 2023 --   [facility][id number] [facility] [address] AT [address] [place] [address]    Sig: Take 1 tablet (40 mg) by mouth daily    Class: E-Prescribe    Route: Oral    pregabalin (LYRICA) 25 MG capsule  30 capsule 1 2024 --   [facility]  [place] [address]    Sig: Take one po qd    Class: E-Prescribe    prochlorperazine (COMPAZINE) 5 MG tablet  60 tablet 3 2024 --   [facility]  [place] [address]    Sig: Take 1 tablet (5 mg) by mouth every 6 hours as needed for nausea or vomiting    Class: E-Prescribe    Route: Oral    sennosides (SENOKOT) 8.6 MG tablet  60 tablet 1 2021 --   [facility][id number] [place], [place] - [address] [facility] [address]  & [facility]    Si-2 tabs a day as needed for constipation    Class: E-Prescribe    spironolactone (ALDACTONE) 25 MG tablet  45 tablet 9 2023 --   [facility] #[id number] [place], [place] - [address] AT [facility] [address]    Sig: Take 1 tablet (25 mg) by mouth daily. May also take 0.5 tablets (12.5 mg) daily as needed (for weight gain).    Class: E-Prescribe    Route: Oral    sucralfate (CARAFATE) 1 GM tablet  120 tablet 3 2023 --   [facility] [id number] [facility] - [place], [place] - [address] [place] [address]    Sig: Take 1 tablet (1 g) by mouth 4  times daily    Class: E-Prescribe    Route: Oral    terbinafine (LAMISIL) 1 % external cream  42 g 1 2/11/2022 --   [address]    Sig: Apply topically 2 times daily    Class: E-Prescribe    Notes to Pharmacy: Please deliver to home address.    Route: Topical    tiZANidine (ZANAFLEX) 4 MG tablet  21 tablet 3 9/6/2023 --   [facility] #[id number] - [facility] [address] AT [facility] [address]    Sig: Take 1 tablet (4 mg) by mouth 3 times daily as needed for muscle spasms    Class: E-Prescribe    Route: Oral    traMADol (ULTRAM) 50 MG tablet  60 tablet 3 12/9/2023 --   [facility] [place] [address]    Sig: TAKE 1 TABLET(50 MG) BY MOUTH EVERY 8 HOURS AS NEEDED FOR SEVERE PAIN    Class: E-Prescribe    Prior authorization: Closed    vitamin D3 (CHOLECALCIFEROL) 50 mcg (2000 units) tablet  90 tablet 1 2/7/2024 --   [facility] [place] [address]    Sig: Take 1 tablet (50 mcg) by mouth daily    Class: E-Prescribe    Notes to Pharmacy: This replaces the 50,000 tablet per Dr Mckinnon    Route: Oral    vitamin D3 (CHOLECALCIFEROL) 50 mcg (2000 units) tablet  90 tablet 0 1/24/2024 --   [facility] [place] [address]    Sig: Take 1 tablet (50 mcg) by mouth daily    Class: E-Prescribe    Notes to Pharmacy: Change in dose per Dr Mckinnon    Route: Oral          Allergies   Allergen Reactions    Amoxicillin-Pot Clavulanate      Unknown possible hives and edema    Amoxicillin-Pot Clavulanate     Artificial Sweetner (Informational Only)  Other (See Comments)     Increased headache    Azithromycin     Clavulanic Acid     Diatrizoate Other (See Comments)     Pt wants this listed because she is allergic to shellfish     Imitrex [Triptans]      Severe face/neck/chest tightness and flushing side effects     Penicillins Hives     Unknown     Pork Allergy      Stomach  pain, cramping, diarrhea, itching, nausea and headaches    Shellfish Allergy Hives and Swelling    Shellfish-Derived Products     Sulfa Antibiotics Hives and Swelling    Sulfatolamide     Zithromax [Azithromycin Dihydrate] Swelling     Unknown      Immunization History   Administered Date(s) Administered    COVID-19 Monovalent 12+ (Pfizer ) 2022, 2022    Flu, Unspecified 1994    Influenza (IIV3) PF 2001, 2005, 2006, 2008, 10/13/2008, 10/21/2011, 2012, 10/10/2013, 10/14/2016    Influenza Vaccine >6 months,quad, PF 10/06/2014, 2015, 10/04/2017, 2018, 2022    Pneumo Conj 13-V (2010&after) 2016    TDAP Vaccine (Adacel) 2006    TDAP Vaccine (Boostrix) 2016       OB History    Para Term  AB Living   4 1 1 0 3 1   SAB IAB Ectopic Multiple Live Births   0 3 0 0 1     Past Medical History:   Diagnosis Date    Abnormal glandular Papanicolaou smear of cervix     Allergic rhinitis     Allergy, airborne subst    Arthritis     ASCVD (arteriosclerotic cardiovascular disease)     Chronic pain     Chronic pancreatitis (H)     idiopathic, Tx: PPI, antioxidants, pancreatic enzymes    Common migraine     Coronary artery disease     Costochondritis     Difficulty in walking(719.7)     Dyspnea on exertion     Ectasia, mammary duct     [facility], persistent nipple discharge    Elevated fasting glucose     Gastro-oesophageal reflux disease     Granulomatosis with polyangiitis (H)     Hernia     History of angina     Hyperlipidemia LDL goal < 100     Hypertension goal BP (blood pressure) < 140/90     Essential hypertension    Iron deficiency anemia     Ischemic cardiomyopathy     Menorrhagia     Migraine headaches     Mild persistent asthma     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 Matias, neg w/u, Rx'd w/prednisone    Ventral hernia, unspecified, without mention of obstruction or gangrene 2012     Past Surgical History:   Procedure Laterality Date    C/SECTION, LOW TRANSVERSE  1996        CARDIAC SURGERY      cardiac stent- recent in 16; 4 other stents    COLONOSCOPY N/A 2023    Procedure: COLONOSCOPY, WITH POLYPECTOMY;  Surgeon: Percy Gross MD;  Location: [facility] OR    DILATION AND CURETTAGE N/A 2016    Procedure: DILATION AND CURETTAGE;  Surgeon: Nahed Butler MD;  [facility]    ENDOMETRIAL SAMPLING (BIOPSY) N/A 2023    Procedure: ENDOMETRIAL BIOPSY;  Surgeon: Nahed Butler MD;  [facility]    ESOPHAGOSCOPY, GASTROSCOPY, DUODENOSCOPY (EGD), COMBINED N/A 2022    Procedure: ESOPHAGOGASTRODUODENOSCOPY, WITH BIOPSY;  Surgeon: Enzo Sesay MD;  [facility]    ESOPHAGOSCOPY, GASTROSCOPY, DUODENOSCOPY (EGD), COMBINED N/A 2023    Procedure: ESOPHAGOGASTRODUODENOSCOPY, WITH BIOPSY;  Surgeon: ePrcy Gross MD;  Location: [facility] OR    EXAM UNDER ANESTHESIA PELVIC N/A 2023    Procedure: EXAM UNDER ANESTHESIA;  Surgeon: Nahed Butler MD;  [facility]    HC UGI ENDOSCOPY W EUS  2008    Dr. Pastrana, possible chronic pancreatitis, fatty liver    HERNIA REPAIR  2012    done at [facility] [place]    INSERT INTRAUTERINE DEVICE N/A 2016    Procedure: INSERT INTRAUTERINE DEVICE;   Surgeon: Nahed Butler MD;  [facility]    INT UTERINE FIBRIOD EMBOLIZATION  10/29/2014    LAPAROSCOPIC CHOLECYSTECTOMY  2008    Dr. Arnol GRUBBS TX, CERVICAL  1992    s/p HUMERA, done at [facility] in [place].    ORBITOTOMY Right 03/15/2016    Procedure: ORBITOTOMY;  Surgeon: Myron Cyr MD;  Location:  [facility]    ORBITOTOMY Right 2017    Procedure: ORBITOTOMY;  RIGHT ORBITOTOMY AND BIOPSY;  Surgeon: Charis Holbrook MD;  [facility]    REMOVE INTRAUTERINE DEVICE N/A 2023    Procedure: Remove intrauterine device,;  Surgeon: Nahed Butler MD;  [facility]    REPAIR PTOSIS Right 2017    Procedure: REPAIR PTOSIS;  RIGHT UPPER LID PTOSIS REPAIR;  Surgeon: Myron Cyr MD;  Location: [facility]    UPPER GI ENDOSCOPY  10/21/2008    mild gastritis, Dr. Rocky CALDERA ECHO HEART XTHORACIC,COMPLETE, W/O DOPPLER  2004    [facility] WNL, EF 60%     Family History   Problem Relation Age of Onset    Hypertension Mother     Arthritis Mother     Heart Disease Mother         long qt syndrome    Thyroid Disease Mother     C.A.D. Mother     Heart Disease Father 50        heart attack    Cerebrovascular Disease Father     Diabetes Father     Hypertension Father     Depression Father     C.A.D. Father     Neurologic Disorder Sister         migraines    Neurologic Disorder Sister         migraines    Heart Disease Brother 15        MI at 15, 16.     Arthritis Brother         he passed away, had severe arthritis at age 11    C.A.D. Brother     Anesthesia Reaction Son         PONV    Respiratory Son         asthma    Hypertension Maternal Aunt     Diabetes Maternal Uncle     Hypertension Maternal Uncle     Arthritis Maternal Uncle     Eye Disorder Maternal Uncle         cataracts    Cerebrovascular Disease Maternal Uncle     Family History Negative Other         neg for RA, SLE    Unknown/Adopted No family hx of         unknown neurological issues  in her family, mother was adopted    Skin Cancer No family hx of         No known family hx of skin cancer    Deep Vein Thrombosis (DVT) No family hx of      Social History     Socioeconomic History    Marital status: Single     Spouse name: None    Number of children: 1    Years of education: None    Highest education level: None   Occupational History     Employer: NONE    Tobacco Use    Smoking status: Some Days     Current packs/day: 0.00     Average packs/day: 0.2 packs/day for 1 year (0.2 ttl pk-yrs)     Types: Cigarettes     Start date: 2015     Last attempt to quit: 2016     Years since quittin.2    Smokeless tobacco: Never   Vaping Use    Vaping status: Every Day    Substances: Nicotine   Substance and Sexual Activity    Alcohol use: No     Alcohol/week: 0.0 standard drinks of alcohol    Drug use: No    Sexual activity: Not Currently   Other Topics Concern    Parent/sibling w/ CABG, MI or angioplasty before 65F 55M? Yes   Social History Narrative    Balanced Diet - sometimes    Osteoporosis Prevention Measures - Dairy servings per day: 2 servings weekly    Regular Exercise -  Yes Describe walking 4 times a week    Dental Exam - NO    Seatbelts used - Yes    Self Breast Exam - Yes    Abuse: Current or Past (Physical, Sexual or Emotional)- No    Do you have any concerns about STD's -  No    Do you feel safe in your environment - No    Guns stored in the home - No    Sunscreen used - Yes    Lipids -  YES - Date:     Glucose -  YES - Date:     Eye Exam - YES - Date: one year ago    Colon Cancer Screening - No    Hemoccults - NO    Pap Test -  YES - Date: , remote history of LEEP    Mammography - YES - Date: last spring, would like to discuss, needs a referral to [facility]    DEXA - NO    Immunizations reviewed and up to date - Yes, last td given in  or      Social Determinants of Health     Financial Resource Strain: Low Risk  (2024)    Financial  Resource Strain     Within the past 12 months, have you or your family members you live with been unable to get utilities (heat, electricity) when it was really needed?: No   Food Insecurity: High Risk (2/9/2024)    Food Insecurity     Within the past 12 months, did you worry that your food would run out before you got money to buy more?: Yes     Within the past 12 months, did the food you bought just not last and you didn t have money to get more?: No   Transportation Needs: Low Risk  (2/9/2024)    Transportation Needs     Within the past 12 months, has lack of transportation kept you from medical appointments, getting your medicines, non-medical meetings or appointments, work, or from getting things that you need?: No   Interpersonal Safety: Low Risk  (2/9/2024)    Interpersonal Safety     Do you feel physically and emotionally safe where you currently live?: Yes     Within the past 12 months, have you been hit, slapped, kicked or otherwise physically hurt by someone?: No     Within the past 12 months, have you been humiliated or emotionally abused in other ways by your partner or ex-partner?: No   Housing Stability: Low Risk  (2/9/2024)    Housing Stability     Do you have housing? : Yes     Are you worried about losing your housing?: No       ROS    EXAM:  Blood pressure 124/78, pulse 80, weight 74.8 kg (165 lb), not currently breastfeeding. Body mass index is 29.23 kg/m .  General appearance: Pleasant female in no acute distress.     BREAST EXAM:  Breast: Without visible skin changes. No dimpling or lesions seen.   Breasts supple, non-tender with palpation, no dominant mass, nodularity, or nipple discharge noted bilaterally. Axillary nodes negative.      PELVIC EXAM:  EG/BUS: Normal genital architecture without lesions, erythema or abnormal secretions Bartholin's, Urethra, Kimball's normal   Urethral meatus: normal    Urethra: no masses, tenderness, or scarring    Bladder: no masses or tenderness    Vagina:  mildly atrophic, moist, with pink mucosa with creamy, white, and odorless  secretions  Cervix: no lesions and pink, moist, closed, without lesion or CMT  Uterus: anteverted,  and seems normal in size.  Previously globular, 10 week size  Adnexa: Within normal limits and No masses, nodularity, tenderness  Rectum:anus normal       ASSESSMENT:  Encounter Diagnoses   Name Primary?    Encounter for gynecological examination without abnormal finding Yes    Special screening examination for chlamydial disease     DM (diabetes mellitus), type 2 with complications (H)       58 year old Female Pelvic and Breast Exam  Vaginal estrogen loss on Vagifem with good results.    PLAN:   Orders Placed This Encounter   Procedures    Pelvic and Breast Exam Procedure []    Chlamydia trachomatis PCR (Clinic Collect)     Return 2025 for next pap smear  Results via letter if normal, otherwise phone call.  Return in one year/PRN for preventive care or problems/concerns.     Verbalized understanding and agreement with visit plan.    Nahed Butler MD

## 2024-05-03 LAB — C TRACH DNA SPEC QL NAA+PROBE: NEGATIVE

## 2024-05-16 ENCOUNTER — TELEPHONE (OUTPATIENT)
Dept: CARDIOLOGY | Facility: CLINIC | Age: 59
End: 2024-05-16
Payer: COMMERCIAL

## 2024-05-16 NOTE — TELEPHONE ENCOUNTER
Left Voicemail (1st Attempt) for the patient to call back and schedule the following:    Appointment type: rtn cardio  Provider: arslan  Return date: 09/07/24  Specialty phone number: [phone] opt 1  Additional appointment(s) needed: n/a   Additonal Notes: n/a

## 2024-05-18 NOTE — TELEPHONE ENCOUNTER
Left Voicemail (2nd Attempt) for the patient to call back and schedule the following:    Appointment type: rtn cardio  Provider: arslan  Return date: 09/07/24  Specialty phone number: [phone] opt 1  Additional appointment(s) needed: n/a   Additonal Notes: n/a

## 2024-05-30 DIAGNOSIS — I25.10 CORONARY ARTERY DISEASE INVOLVING NATIVE HEART WITHOUT ANGINA PECTORIS, UNSPECIFIED VESSEL OR LESION TYPE: ICD-10-CM

## 2024-05-30 DIAGNOSIS — M19.90 INFLAMMATORY ARTHRITIS: ICD-10-CM

## 2024-05-30 DIAGNOSIS — I21.4 NSTEMI (NON-ST ELEVATED MYOCARDIAL INFARCTION) (H): Primary | ICD-10-CM

## 2024-05-30 DIAGNOSIS — I10 BENIGN ESSENTIAL HYPERTENSION: ICD-10-CM

## 2024-05-30 DIAGNOSIS — I10 HYPERTENSION GOAL BP (BLOOD PRESSURE) < 140/90: ICD-10-CM

## 2024-05-30 DIAGNOSIS — M79.2 NEURALGIA: ICD-10-CM

## 2024-05-30 DIAGNOSIS — I10 HYPERTENSION, UNSPECIFIED TYPE: ICD-10-CM

## 2024-05-30 RX ORDER — PRAVASTATIN SODIUM 40 MG
40 TABLET ORAL DAILY
Qty: 90 TABLET | Refills: 3 | OUTPATIENT
Start: 2024-05-30

## 2024-05-30 RX ORDER — CLOPIDOGREL BISULFATE 75 MG/1
75 TABLET ORAL DAILY
Qty: 90 TABLET | Refills: 3 | OUTPATIENT
Start: 2024-05-30

## 2024-05-30 RX ORDER — PREGABALIN 25 MG/1
CAPSULE ORAL
Qty: 30 CAPSULE | Refills: 1 | Status: SHIPPED | OUTPATIENT
Start: 2024-05-30 | End: 2024-07-31

## 2024-05-30 RX ORDER — METOPROLOL SUCCINATE 100 MG/1
100 TABLET, EXTENDED RELEASE ORAL DAILY
Qty: 90 TABLET | Refills: 1 | OUTPATIENT
Start: 2024-05-30

## 2024-05-30 RX ORDER — SPIRONOLACTONE 25 MG/1
TABLET ORAL
Qty: 135 TABLET | Refills: 3 | OUTPATIENT
Start: 2024-05-30

## 2024-05-30 RX ORDER — LISINOPRIL AND HYDROCHLOROTHIAZIDE 20; 25 MG/1; MG/1
1 TABLET ORAL DAILY
Qty: 90 TABLET | Refills: 3 | OUTPATIENT
Start: 2024-05-30

## 2024-05-30 NOTE — TELEPHONE ENCOUNTER
PREGABALIN 25 MG CAPSULE       Last Written Prescription Date:  2-9-24  Last Fill Quantity: 30,   # refills: 1  Last Office Visit : 3-15-24  Future Office visit:  6-5-24    Routing refill request to provider for review/approval because:  Drug not on the [facility], [facility] or [facility] refill protocol or controlled substance

## 2024-05-30 NOTE — TELEPHONE ENCOUNTER
[facility] Call Center    Phone Message    May a detailed message be left on voicemail: yes     Reason for Call: Other: The pharmacy said they received a denial and the patient is out of medication. Pharmacy calling to heck if we will refill?     Action Taken: Other: Cardiology    Travel Screening: Not Applicable    Thank you!  [facility]       Date of Service:

## 2024-05-30 NOTE — TELEPHONE ENCOUNTER
SPIRONOLACTONE 25 MG TABLET      Last Written Prescription Date:  8/28/23  Last Fill Quantity: 45,   # refills: 9       PRAVASTATIN SODIUM 40 MG TAB     Last Written Prescription Date:  6/29/23  Last Fill Quantity: 90,   # refills: 3       METOPROLOL SUCC  MG TAB    Last Written Prescription Date:  6/29/23  Last Fill Quantity: 90,   # refills: 3      LISINOPRIL-HCTZ 20-25 MG TAB       Last Written Prescription Date:  6/29/23  Last Fill Quantity: 90,   # refills: 3    CLOPIDOGREL 75 MG TABLET      Last Written Prescription Date:  6/29/23  Last Fill Quantity: 90,   # refills: 3    Last Office Visit : 12/14/23  Future Office visit:  none> one year with fasting labs and ECHO    Routing refill request to provider for review/approval because: she is asking for all of her refills to have 6 months to 1 year worth of refills  ( generally we can refill for one year since previous appt 12/14/23)  Lisinopril, spironolactone fail due to: GFR abn 2/12/24  Last year 1 year of refills was provided. The refills are pended x 1 year.    GFR Estimate   Date Value Ref Range Status   02/12/2024 60 (L) >60 mL/min/1.73m2 Final   05/28/2021 64 >60 mL/min/[1.73_m2] Final     Comment:     Non  GFR Calc  Starting 12/18/2018, serum creatinine based estimated GFR (eGFR) will be   calculated using the Chronic Kidney Disease Epidemiology Collaboration   (CKD-EPI) equation.        LDL Cholesterol Calculated   Date Value Ref Range Status   12/14/2023 82 <=100 mg/dL Final   06/18/2020 50 <100 mg/dL Final     Comment:     Desirable:       <100 mg/dl     LDL Cholesterol Direct   Date Value Ref Range Status   12/14/2023 94 <100 mg/dL Final     Comment:     Age 2-19 years:  Desirable: 0-110 mg/dL   Borderline high: 110-129 mg/dL   High: >= 130 mg/dL    Age 20 years and older:  Desirable: <100mg/dL  Above desirable: 100-129 mg/dL   Borderline high: 130-159 mg/dL   High: 160-189 mg/dL   Very high: >= 190 mg/dL

## 2024-05-30 NOTE — TELEPHONE ENCOUNTER
[facility] Call Center    Phone Message    May a detailed message be left on voicemail: yes     Reason for Call: Other: Patient called, she is asking for all of her refills to have 6 months to 1 year worth of refills that are being requested below     Action Taken: Other: cardiology    Travel Screening: Not Applicable     Date of Service:

## 2024-05-31 ENCOUNTER — TELEPHONE (OUTPATIENT)
Dept: CARDIOLOGY | Facility: CLINIC | Age: 59
End: 2024-05-31
Payer: COMMERCIAL

## 2024-05-31 DIAGNOSIS — I10 HYPERTENSION GOAL BP (BLOOD PRESSURE) < 140/90: ICD-10-CM

## 2024-05-31 DIAGNOSIS — I25.10 CORONARY ARTERY DISEASE INVOLVING NATIVE HEART WITHOUT ANGINA PECTORIS, UNSPECIFIED VESSEL OR LESION TYPE: ICD-10-CM

## 2024-05-31 DIAGNOSIS — I10 HYPERTENSION, UNSPECIFIED TYPE: ICD-10-CM

## 2024-05-31 DIAGNOSIS — I10 BENIGN ESSENTIAL HYPERTENSION: ICD-10-CM

## 2024-05-31 RX ORDER — LISINOPRIL AND HYDROCHLOROTHIAZIDE 20; 25 MG/1; MG/1
1 TABLET ORAL DAILY
Qty: 30 TABLET | Refills: 6 | Status: SHIPPED | OUTPATIENT
Start: 2024-05-31

## 2024-05-31 RX ORDER — METOPROLOL SUCCINATE 100 MG/1
100 TABLET, EXTENDED RELEASE ORAL DAILY
Qty: 90 TABLET | Refills: 3 | Status: CANCELLED | OUTPATIENT
Start: 2024-05-31

## 2024-05-31 RX ORDER — SPIRONOLACTONE 25 MG/1
TABLET ORAL
Qty: 45 TABLET | Refills: 9 | Status: CANCELLED | OUTPATIENT
Start: 2024-05-31

## 2024-05-31 RX ORDER — CLOPIDOGREL BISULFATE 75 MG/1
75 TABLET ORAL DAILY
Qty: 90 TABLET | Refills: 3 | Status: CANCELLED | OUTPATIENT
Start: 2024-05-31

## 2024-05-31 RX ORDER — PRAVASTATIN SODIUM 40 MG
40 TABLET ORAL DAILY
Qty: 90 TABLET | Refills: 3 | Status: CANCELLED | OUTPATIENT
Start: 2024-05-31

## 2024-05-31 RX ORDER — PRAVASTATIN SODIUM 40 MG
40 TABLET ORAL DAILY
Qty: 90 TABLET | Refills: 2 | OUTPATIENT
Start: 2024-05-31

## 2024-05-31 RX ORDER — METOPROLOL SUCCINATE 100 MG/1
100 TABLET, EXTENDED RELEASE ORAL DAILY
Qty: 30 TABLET | Refills: 6 | Status: SHIPPED | OUTPATIENT
Start: 2024-05-31

## 2024-05-31 RX ORDER — CLOPIDOGREL BISULFATE 75 MG/1
75 TABLET ORAL DAILY
Qty: 30 TABLET | Refills: 6 | Status: SHIPPED | OUTPATIENT
Start: 2024-05-31

## 2024-05-31 RX ORDER — PRAVASTATIN SODIUM 40 MG
40 TABLET ORAL DAILY
Qty: 30 TABLET | Refills: 6 | Status: SHIPPED | OUTPATIENT
Start: 2024-05-31

## 2024-05-31 RX ORDER — LISINOPRIL AND HYDROCHLOROTHIAZIDE 20; 25 MG/1; MG/1
1 TABLET ORAL DAILY
Qty: 90 TABLET | Refills: 3 | Status: CANCELLED | OUTPATIENT
Start: 2024-05-31

## 2024-05-31 RX ORDER — SPIRONOLACTONE 25 MG/1
TABLET ORAL
Qty: 45 TABLET | Refills: 6 | Status: SHIPPED | OUTPATIENT
Start: 2024-05-31

## 2024-05-31 NOTE — TELEPHONE ENCOUNTER
[facility] Call Center    Phone Message    May a detailed message be left on voicemail: yes     Reason for Call: Medication Refill Request    Has the patient contacted the pharmacy for the refill? Yes   Name of medication being requested: spironolactone (ALDACTONE) 25 MG tablet  clopidogrel (PLAVIX) 75 MG tablet  lisinopril-hydrochlorothiazide (ZESTORETIC) 20-25 MG tablet  metoprolol succinate ER (TOPROL XL) 100 MG 24 hr tablet  pravastatin (PRAVACHOL) 40 MG tablet    Pharmacy:    [facility] [id number] [place] [address]    Date medication is needed: PETER Hunter, pharmacist with [facility] called requesting for refill orders to be sent to pharmacy as soon as possible as patient is completely out. Elsa stated they sent in request for refills and was denied and told there should be refills on file, but there is not. Please review and send orders as needed. Thank you!    Action Taken: Other: Cardiology    Travel Screening: Not Applicable     Thank you!  [facility]

## 2024-05-31 NOTE — TELEPHONE ENCOUNTER
[facility] Call Center    Phone Message    May a detailed message be left on voicemail: yes     Reason for Call: Other: Janine called in stating she was told by her pharmacy that 6 to 7 of her prescriptions had been cancelled and/or no longer have refills attached to them.  Janine is upset and worried as she is going into the weekend that she will be out.  Please call Janine back to further discuss.  It is okay to leave a message if she doesn't answer.     Action Taken: Other: Cardiology    Travel Screening: Not Applicable    Thank you!  [facility]

## 2024-05-31 NOTE — TELEPHONE ENCOUNTER
Writer called and talked with pt. Sent refills for all of pt's cardiac medications to [facility]. Sent as 30 day supplies per pt request.

## 2024-06-02 DIAGNOSIS — E11.9 TYPE 2 DIABETES, HBA1C GOAL < 7% (H): ICD-10-CM

## 2024-06-05 ENCOUNTER — TELEPHONE (OUTPATIENT)
Dept: FAMILY MEDICINE | Facility: CLINIC | Age: 59
End: 2024-06-05

## 2024-06-05 ENCOUNTER — OFFICE VISIT (OUTPATIENT)
Dept: RHEUMATOLOGY | Facility: CLINIC | Age: 59
End: 2024-06-05
Attending: INTERNAL MEDICINE
Payer: COMMERCIAL

## 2024-06-05 ENCOUNTER — LAB (OUTPATIENT)
Dept: LAB | Facility: CLINIC | Age: 59
End: 2024-06-05
Payer: COMMERCIAL

## 2024-06-05 ENCOUNTER — OFFICE VISIT (OUTPATIENT)
Dept: FAMILY MEDICINE | Facility: CLINIC | Age: 59
End: 2024-06-05
Payer: COMMERCIAL

## 2024-06-05 VITALS
SYSTOLIC BLOOD PRESSURE: 121 MMHG | WEIGHT: 163 LBS | HEART RATE: 73 BPM | BODY MASS INDEX: 28.87 KG/M2 | DIASTOLIC BLOOD PRESSURE: 81 MMHG

## 2024-06-05 VITALS
HEIGHT: 63 IN | WEIGHT: 163.2 LBS | SYSTOLIC BLOOD PRESSURE: 114 MMHG | HEART RATE: 74 BPM | BODY MASS INDEX: 28.92 KG/M2 | OXYGEN SATURATION: 97 % | DIASTOLIC BLOOD PRESSURE: 79 MMHG

## 2024-06-05 DIAGNOSIS — I15.2 HYPERTENSION ASSOCIATED WITH DIABETES (H): ICD-10-CM

## 2024-06-05 DIAGNOSIS — E10.8 TYPE 1 DIABETES MELLITUS WITH COMPLICATIONS (H): Primary | ICD-10-CM

## 2024-06-05 DIAGNOSIS — I77.82 ANCA-ASSOCIATED VASCULITIS (H): ICD-10-CM

## 2024-06-05 DIAGNOSIS — M54.41 LOW BACK PAIN DUE TO BILATERAL SCIATICA: ICD-10-CM

## 2024-06-05 DIAGNOSIS — Z51.81 ENCOUNTER FOR MEDICATION MONITORING: ICD-10-CM

## 2024-06-05 DIAGNOSIS — M54.42 LOW BACK PAIN DUE TO BILATERAL SCIATICA: ICD-10-CM

## 2024-06-05 DIAGNOSIS — R29.898 BILATERAL ARM WEAKNESS: ICD-10-CM

## 2024-06-05 DIAGNOSIS — M54.12 CERVICAL RADICULOPATHY: ICD-10-CM

## 2024-06-05 DIAGNOSIS — M31.30 GRANULOMATOSIS WITH POLYANGIITIS WITHOUT RENAL INVOLVEMENT (H): ICD-10-CM

## 2024-06-05 DIAGNOSIS — E10.8 TYPE 1 DIABETES MELLITUS WITH COMPLICATIONS (H): ICD-10-CM

## 2024-06-05 DIAGNOSIS — I77.82 ANCA-ASSOCIATED VASCULITIS (H): Primary | ICD-10-CM

## 2024-06-05 DIAGNOSIS — E11.59 HYPERTENSION ASSOCIATED WITH DIABETES (H): ICD-10-CM

## 2024-06-05 DIAGNOSIS — M54.2 CERVICALGIA: ICD-10-CM

## 2024-06-05 LAB
ALBUMIN MFR UR ELPH: <6 MG/DL
ALBUMIN SERPL BCG-MCNC: 4.9 G/DL (ref 3.5–5.2)
ALBUMIN UR-MCNC: NEGATIVE MG/DL
ALT SERPL W P-5'-P-CCNC: 27 U/L (ref 0–50)
APPEARANCE UR: CLEAR
AST SERPL W P-5'-P-CCNC: 24 U/L (ref 0–45)
BASOPHILS # BLD AUTO: 0.1 10E3/UL (ref 0–0.2)
BASOPHILS NFR BLD AUTO: 0 %
BILIRUB UR QL STRIP: NEGATIVE
CD19 B CELL COMMENT: ABNORMAL
CD19 CELLS # BLD: 1 CELLS/UL (ref 107–698)
CD19 CELLS NFR BLD: <1 % (ref 6–27)
COLOR UR AUTO: ABNORMAL
CREAT SERPL-MCNC: 1.06 MG/DL (ref 0.51–0.95)
CREAT UR-MCNC: 63.8 MG/DL
CRP SERPL-MCNC: 3.14 MG/L
EGFRCR SERPLBLD CKD-EPI 2021: 61 ML/MIN/1.73M2
EOSINOPHIL # BLD AUTO: 0.2 10E3/UL (ref 0–0.7)
EOSINOPHIL NFR BLD AUTO: 2 %
ERYTHROCYTE [DISTWIDTH] IN BLOOD BY AUTOMATED COUNT: 14.7 % (ref 10–15)
ERYTHROCYTE [SEDIMENTATION RATE] IN BLOOD BY WESTERGREN METHOD: 13 MM/HR (ref 0–30)
GLUCOSE UR STRIP-MCNC: >=1000 MG/DL
HBA1C MFR BLD: 8.4 %
HCT VFR BLD AUTO: 44 % (ref 35–47)
HGB BLD-MCNC: 14.5 G/DL (ref 11.7–15.7)
HGB UR QL STRIP: NEGATIVE
IMM GRANULOCYTES # BLD: 0.1 10E3/UL
IMM GRANULOCYTES NFR BLD: 0 %
KETONES UR STRIP-MCNC: NEGATIVE MG/DL
LEUKOCYTE ESTERASE UR QL STRIP: NEGATIVE
LYMPHOCYTES # BLD AUTO: 2.4 10E3/UL (ref 0.8–5.3)
LYMPHOCYTES NFR BLD AUTO: 19 %
MCH RBC QN AUTO: 25.3 PG (ref 26.5–33)
MCHC RBC AUTO-ENTMCNC: 33 G/DL (ref 31.5–36.5)
MCV RBC AUTO: 77 FL (ref 78–100)
MONOCYTES # BLD AUTO: 0.7 10E3/UL (ref 0–1.3)
MONOCYTES NFR BLD AUTO: 5 %
MUCOUS THREADS #/AREA URNS LPF: PRESENT /LPF
NEUTROPHILS # BLD AUTO: 9.5 10E3/UL (ref 1.6–8.3)
NEUTROPHILS NFR BLD AUTO: 74 %
NITRATE UR QL: NEGATIVE
NRBC # BLD AUTO: 0 10E3/UL
NRBC BLD AUTO-RTO: 0 /100
PH UR STRIP: 5 [PH] (ref 5–7)
PLATELET # BLD AUTO: 384 10E3/UL (ref 150–450)
PROT/CREAT 24H UR: NORMAL MG/G{CREAT}
RBC # BLD AUTO: 5.74 10E6/UL (ref 3.8–5.2)
RBC URINE: 1 /HPF
SP GR UR STRIP: 1.03 (ref 1–1.03)
SQUAMOUS EPITHELIAL: <1 /HPF
UROBILINOGEN UR STRIP-MCNC: NORMAL MG/DL
WBC # BLD AUTO: 12.9 10E3/UL (ref 4–11)
WBC URINE: <1 /HPF

## 2024-06-05 PROCEDURE — 82040 ASSAY OF SERUM ALBUMIN: CPT | Performed by: PATHOLOGY

## 2024-06-05 PROCEDURE — 84156 ASSAY OF PROTEIN URINE: CPT | Performed by: PATHOLOGY

## 2024-06-05 PROCEDURE — 99214 OFFICE O/P EST MOD 30 MIN: CPT | Performed by: INTERNAL MEDICINE

## 2024-06-05 PROCEDURE — 84450 TRANSFERASE (AST) (SGOT): CPT | Performed by: PATHOLOGY

## 2024-06-05 PROCEDURE — 83516 IMMUNOASSAY NONANTIBODY: CPT | Performed by: INTERNAL MEDICINE

## 2024-06-05 PROCEDURE — 86140 C-REACTIVE PROTEIN: CPT | Performed by: PATHOLOGY

## 2024-06-05 PROCEDURE — 82784 ASSAY IGA/IGD/IGG/IGM EACH: CPT | Performed by: INTERNAL MEDICINE

## 2024-06-05 PROCEDURE — G2211 COMPLEX E/M VISIT ADD ON: HCPCS | Performed by: INTERNAL MEDICINE

## 2024-06-05 PROCEDURE — 36415 COLL VENOUS BLD VENIPUNCTURE: CPT | Performed by: PATHOLOGY

## 2024-06-05 PROCEDURE — G0463 HOSPITAL OUTPT CLINIC VISIT: HCPCS | Performed by: INTERNAL MEDICINE

## 2024-06-05 PROCEDURE — 85652 RBC SED RATE AUTOMATED: CPT | Performed by: PATHOLOGY

## 2024-06-05 PROCEDURE — 85025 COMPLETE CBC W/AUTO DIFF WBC: CPT | Performed by: PATHOLOGY

## 2024-06-05 PROCEDURE — 86036 ANCA SCREEN EACH ANTIBODY: CPT | Performed by: INTERNAL MEDICINE

## 2024-06-05 PROCEDURE — 82565 ASSAY OF CREATININE: CPT | Performed by: PATHOLOGY

## 2024-06-05 PROCEDURE — 81001 URINALYSIS AUTO W/SCOPE: CPT | Performed by: PATHOLOGY

## 2024-06-05 PROCEDURE — 99000 SPECIMEN HANDLING OFFICE-LAB: CPT | Performed by: PATHOLOGY

## 2024-06-05 PROCEDURE — 83036 HEMOGLOBIN GLYCOSYLATED A1C: CPT | Performed by: FAMILY MEDICINE

## 2024-06-05 PROCEDURE — 99215 OFFICE O/P EST HI 40 MIN: CPT | Performed by: FAMILY MEDICINE

## 2024-06-05 PROCEDURE — 84460 ALANINE AMINO (ALT) (SGPT): CPT | Performed by: PATHOLOGY

## 2024-06-05 PROCEDURE — G2211 COMPLEX E/M VISIT ADD ON: HCPCS | Performed by: FAMILY MEDICINE

## 2024-06-05 PROCEDURE — 86355 B CELLS TOTAL COUNT: CPT | Performed by: INTERNAL MEDICINE

## 2024-06-05 RX ORDER — LANOLIN ALCOHOL/MO/W.PET/CERES
1 CREAM (GRAM) TOPICAL
COMMUNITY
Start: 2024-05-30

## 2024-06-05 ASSESSMENT — ASTHMA QUESTIONNAIRES
QUESTION_2 LAST FOUR WEEKS HOW OFTEN HAVE YOU HAD SHORTNESS OF BREATH: ONCE OR TWICE A WEEK
ACT_TOTALSCORE: 17
QUESTION_4 LAST FOUR WEEKS HOW OFTEN HAVE YOU USED YOUR RESCUE INHALER OR NEBULIZER MEDICATION (SUCH AS ALBUTEROL): TWO OR THREE TIMES PER WEEK
QUESTION_3 LAST FOUR WEEKS HOW OFTEN DID YOUR ASTHMA SYMPTOMS (WHEEZING, COUGHING, SHORTNESS OF BREATH, CHEST TIGHTNESS OR PAIN) WAKE YOU UP AT NIGHT OR EARLIER THAN USUAL IN THE MORNING: TWO OR THREE NIGHTS A WEEK
QUESTION_5 LAST FOUR WEEKS HOW WOULD YOU RATE YOUR ASTHMA CONTROL: SOMEWHAT CONTROLLED
ACT_TOTALSCORE: 17
QUESTION_1 LAST FOUR WEEKS HOW MUCH OF THE TIME DID YOUR ASTHMA KEEP YOU FROM GETTING AS MUCH DONE AT WORK, SCHOOL OR AT HOME: NONE OF THE TIME

## 2024-06-05 ASSESSMENT — PAIN SCALES - GENERAL: PAINLEVEL: SEVERE PAIN (6)

## 2024-06-05 NOTE — LETTER
6/5/2024       RE: Janine Cornell  [address]  [facility] [address]     Dear Colleague,    Thank you for referring your patient, Janine Cornell, to the [facility] [place] at [facility]. Please see a copy of my visit note below.    Rheumatology F/U In Person Visit Note    Last visit note: 1/31/2024    Today's visit date: 6/5/2024    Reason for in person visit: F/U GPA    HPI     Ms. 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 She is s/p tx with HCQ since 5/2014, now off due to abnormal eye exam. On rituximab since 12/20218 with great response. Previously tried and did not tolerate MTX, AZA.    She had lateral orbitotomy and debulking orbital mass right eye on 9/26/18 with Dr. Shah and Dr. Valdez at [place]. 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 There were no complications according to the op note.    Today 6/5/2024:    -reports arm weakness    -has back pain    -continues to have joint pain    -has more headaches, R eye pain    -neuropathy is an issue     1/31/2024:      Nortriptyline was prescribed by another provider, but has not tried it yet    R side of her face started swelling again.    Reports pain over neck, hips, knees and hands.          9/6/2023:    -reports pain/swelling over hands/feet, has more arthritis problem over past year  -gets numbness over toes, bottom of feet, can't see neurology till 1/2024  -walking causes pain in the feet  -gets pain over hips after a trip to [email or web address], it is a new problem  -sometimes gets swelling over R cheek, noticed it yesterday outside with heat, her face was also bright red        3/9/2023: Reports pain/swelling of her hands. S/p last rituximab 1 gram on 9/7/2022.       1/25/2023: Janine is doing a phone visit for follow up, was feeling ill and switched in person to  phone visit. She got sick around [employer], saw her PCP on 12/17, was tested positive for COVID, it took a longtime to feel better, did not take paxlovid as it was already past 2 weeks. Since then, she has not been feeling well. Has headaches, body ache, her eyes especially R eye look pink, they burn and itch a lot. Has sense of smell but can not taste her food. She is very tired, hardly leaves her house. Last time, left house for doctor visit, was tired and sore. Has tingling and pinprick feeling over arms and legs. Has upcoming follow up with PCP on 2/10. She had thyroid US for thyroid nodules. She had abnormal screening mammogram on 1/9, will go back for diagnostic mammogram and US of L breast on 1/31.          08/19/2022  Reports fatigue and headaches. Headache worsens after taking Zofran for nausea. Joint symptoms come and go. Last rituximab on 4/6/22. Rituximab is helpful but feels like it wears off prior to the 6 months. Next appointment scheduled for October 2022. Develops intermittent vaginal yeast infection and thrush related to rising blood glucose. Right eye without symptoms. No new fevers, chills, skin changes. Son is wondering if she takes herbal supplements for headaches will this interact with any of her medications. Scheduled to meet with pharmacist today.        4/22/2022:   Each rituximab infusion causes vaginal yeast infection and thrush related to rise in BG. Her BG has stayed in higher level since last rituximab. Has more ache and pain, myalgia and arthralgia. Recently has had headaches with high BP. Had last rituximab 1000 mg on 4/6.Her R eye is itching and swelling up.  Today, her BG is 177.Has gained 20 lbs since Feb 2022. Had severe pain in her arm after covid vaccine, it took a month to get better.      12/16/2021: Janine presents for follow up. Reports ESOB with cold, has ongoing joint pain. R eye pain is intermittent.  Reports headaches after rituximab 1 gram on 10/18/2021.  Last week,  her R knee was hurting.      8/20/2021: Janine has pain over arms, knees. Some days, feel stiff all day long. Some days can't do stairs. Hard to tell if there is swelling as has more water retention during summer.  Some days, eye swells up like today. No fevers, SOB, CP or cough.  Has rosacea but some days wakes up with heat rash over sun. Her face is peeling.  Sometimes can't lift things or grab things with pain from elbow to hands. Has shoulder and neck pain but this forearm pain is new.  Stopped HCQ in mid July due to concern for potential HCQ toxicity on OCT, her eye exam with Dr. Vernon has been re-scheduled and upcoming on 9/14 to address HCQ toxicity, pain is not worse off HCQ.  Not COVID vaccinated but is cautious.      5/10/2021: Joint ache, knees hurt, R elbow hurts, R arm hurts, R hand hurts. Has lots of swelling in her joints especially hands.    Depending on weather, joints jhurt, sometimes pain is 7/10.  Has problem with taking stairs and balance.  Gets off balance.   R eye gets tinglin, swelling.  Gets out of breath, gets worse with seasonal allergies.  Over past month and half, took nitro more, like 8 times.  No hemooptysis, no hematuria.  Has allergies.      4/21/2021: Had last rituximab 1 gram on 1/19, 2/2/2021. Reports rituximab starts to wear off earlier, has more sx this time. Eye swelling is intermittent. Gets indigestion/ GERD sx with constipation after the infusion.  She reports having asthma, swelling of neck, arms. She thinks that it could be from her vasculitis. She is worried about going down on rituximab dose.   Otherwise unchanged sx, no SOB or hemoptysis or persistent cough, or   Somedays her knees and hips hurt a lot, feel like bone on bone in her knees, especially on changing position.      Component      Latest Ref Rng 2/28/2013 2/28/2013          12:14 PM 12:14 PM   WBC      4.0 - 11.0 10e9/L     RBC Count      3.8 - 5.2 10e12/L     Hemoglobin      11.7 - 15.7 g/dL     Hematocrit       35.0 - 47.0 %     MCV      78 - 100 fl     MCH      26.5 - 33.0 pg     MCHC      31.5 - 36.5 g/dL     RDW      10.0 - 15.0 %     Platelet Count      150 - 450 10e9/L     Specimen Description           Lyme Screen IgG and IgM           Vitamin D Deficiency screening      30 - 75 ug/L     Ferritin      10 - 300 ng/mL     Sed Rate      0 - 20 mm/h     ALLI Screen by EIA      <1.0     Rheumatoid Factor      0 - 14 IU/mL     CK Total      30 - 225 U/L  78   Uric Acid      2.5 - 7.5 mg/dL 6.7      Component      Latest Ref [facility] 2/28/2013          12:14 PM   WBC      4.0 - 11.0 10e9/L    RBC Count      3.8 - 5.2 10e12/L    Hemoglobin      11.7 - 15.7 g/dL    Hematocrit      35.0 - 47.0 %    MCV      78 - 100 fl    MCH      26.5 - 33.0 pg    MCHC      31.5 - 36.5 g/dL    RDW      10.0 - 15.0 %    Platelet Count      150 - 450 10e9/L    Specimen Description       Serum   Lyme Screen IgG and IgM       Test value: <0.75....Interpretation: Negative....If you highly suspect Lyme . . .   Vitamin D Deficiency screening      30 - 75 ug/L    Ferritin      10 - 300 ng/mL    Sed Rate      0 - 20 mm/h    ALLI Screen by EIA      <1.0    Rheumatoid Factor      0 - 14 IU/mL    CK Total      30 - 225 U/L    Uric Acid      2.5 - 7.5 mg/dL      Component      Latest Ref [facility] 2/28/2013 2/28/2013 2/28/2013 2/28/2013          12:14 PM 12:14 PM 12:14 PM 12:14 PM   WBC      4.0 - 11.0 10e9/L       RBC Count      3.8 - 5.2 10e12/L       Hemoglobin      11.7 - 15.7 g/dL       Hematocrit      35.0 - 47.0 %       MCV      78 - 100 fl       MCH      26.5 - 33.0 pg       MCHC      31.5 - 36.5 g/dL       RDW      10.0 - 15.0 %       Platelet Count      150 - 450 10e9/L       Specimen Description             Lyme Screen IgG and IgM             Vitamin D Deficiency screening      30 - 75 ug/L       Ferritin      10 - 300 ng/mL    10   Sed Rate      0 - 20 mm/h   23 (H)    ALLI Screen by EIA      <1.0  <1.0 . . .     Rheumatoid Factor      0 - 14 IU/mL 26 (H)       CK Total      30 - 225 U/L       Uric Acid      2.5 - 7.5 mg/dL         5/2013  CBC WITH PLATELETS DIFFERENTIAL       Component Value Range    WBC 11.3 (*) 4.0 - 11.0 10e9/L    RBC Count 4.56  3.8 - 5.2 10e12/L    Hemoglobin 11.1 (*) 11.7 - 15.7 g/dL    Hematocrit 34.3 (*) 35.0 - 47.0 %    MCV 75 (*) 78 - 100 fl    MCH 24.3 (*) 26.5 - 33.0 pg    MCHC 32.4  31.5 - 36.5 g/dL    RDW 16.1 (*) 10.0 - 15.0 %    Platelet Count 315  150 - 450 10e9/L    Diff Method Automated Method      % Neutrophils 71.6  40 - 75 %    % Lymphocytes 20.9  20 - 48 %    % Monocytes 4.3  0 - 12 %    % Eosinophils 2.8  0 - 6 %    % Basophils 0.2  0 - 2 %    % Immature Granulocytes 0.2  0 - 0.4 %    Absolute Neutrophil 8.1  1.6 - 8.3 10e9/L    Absolute Lymphocytes 2.4  0.8 - 5.3 10e9/L    Absolute Monoctyes 0.5  0.0 - 1.3 10e9/L    Absolute Eosinophils 0.3  0.0 - 0.7 10e9/L    Absolute Basophils 0.0  0.0 - 0.2 10e9/L    Abs Immature Granulocytes 0.0  0 - 0.03 10e9/L   AMYLASE       Component Value Range    Amylase 103  30 - 110 U/L   COMPREHENSIVE METABOLIC PANEL       Component Value Range    Sodium 144  133 - 144 mmol/L    Potassium 3.8  3.4 - 5.3 mmol/L    Chloride 105  94 - 109 mmol/L    Carbon Dioxide 23  20 - 32 mmol/L    Anion Gap 17  6 - 17 mmol/L    Glucose 173 (*) 60 - 99 mg/dL    Urea Nitrogen 13  5 - 24 mg/dL    Creatinine 0.83  0.52 - 1.04 mg/dL    GFR Estimate 74  >60 mL/min/1.7m2    GFR Estimate If Black 90  >60 mL/min/1.7m2    Calcium 9.7  8.5 - 10.4 mg/dL    Bilirubin Total 0.4  0.2 - 1.3 mg/dL    Albumin 4.3  3.9 - 5.1 g/dL    Protein Total 7.8  6.8 - 8.8 g/dL    Alkaline Phosphatase 66  40 - 150 U/L    ALT 36  0 - 50 U/L    AST 28  0 - 45 U/L   CK TOTAL       Component Value Range    CK Total 66  30 - 225 U/L   CRP INFLAMMATION       Component Value Range    CRP Inflammation 10.4 (*) 0.0 - 8.0 mg/L   LIPASE       Component Value Range    Lipase 353 (*) 20 - 250 U/L   ERYTHROCYTE SEDIMENTATION RATE AUTO       Component Value  Range    Sed Rate 26 (*) 0 - 20 mm/h   ROUTINE UA WITH MICROSCOPIC REFLEX TO CULTURE       Component Value Range    Color Urine Yellow      Appearance Urine Slightly Cloudy      Glucose Urine 30 (*) NEG mg/dL    Bilirubin Urine Negative  NEG    Ketones Urine 5 (*) NEG mg/dL    Specific Gravity Urine 1.026  1.003 - 1.035    Blood Urine Trace (*) NEG    pH Urine 5.0  5.0 - 7.0 pH    Protein Albumin Urine 10 (*) NEG mg/dL    Urobilinogen mg/dL Normal  0.0 - 2.0 mg/dL    Nitrite Urine Negative  NEG    Leukocyte Esterase Urine Negative  NEG    Source Midstream Urine      WBC Urine 1  0 - 2 /HPF    RBC Urine 4 (*) 0 - 2 /HPF    Squamous Epithelial /HPF Urine <1  0 - 1 /HPF    Mucous Urine Present (*) NEG /LPF    Hyaline Casts 3 (*) 0 - 2 /LPF    Calcium Oxalate Moderate (*) NEG /HPF   COMPLEMENT C3       Component Value Range    Complement C3 143  76 - 169 mg/dL   COMPLEMENT C4       Component Value Range    Complement C4 31  15 - 50 mg/dL   HEPATITIS C ANTIBODY       Component Value Range    Hepatitis C Antibody Negative  NEG   CARDIOLIPIN ANTIBODY IGG AND IGM       Component Value Range    Cardiolipin IgG Marline    0 - 15.0 GPL    Value: <15.0      Interpretation:  Negative    Cardiolipin IgM Marline    0 - 12.5 MPL    Value: <12.5      Interpretation:  Negative   LUPUS PANEL       Component Value Range    Lupus Result    NEG    Value: Negative      (Note)      COMMENTS:      The INR is normal.      APTT is normal.  1:2 Mix is not indicated.      DRVVT Screen is normal.      Thrombin time is normal.      NEGATIVE TEST; A LUPUS ANTICOAGULANT WAS NOT DETECTED IN THIS      SPECIMEN WITHIN THE LIMITS OF THE TESTING REPERTOIRE.      If the clinical picture is strongly suggestive of an antiphospholipid      syndrome, recommend anticardiolipin and beta-2-glycoprotein (IgG and      IgM) antibody tests.      Leela Franks M.D.  [phone]      5/2/2013            INR =  0.93 N = 0.86-1.14  (No additional charge)      TT  = 15.7 N = 13.0-19.0 sec  (No additional charge)            APTT'S:    Seconds      Reagent =  Stago LA      Normal  =  38      Patient  =  34      1:2 Mix  =  N/A      Reference =  31-43             DILUTE MADELINE VIPER VENOM TEST:      DRVVT Screen Ratio = 0.76 Normal = <1.21         IMMUNOGLOBULIN G SUBCLASSES       Component Value Range    IGG 1030  695 - 1620 mg/dL    IgG1 488  300 - 856 mg/dL    IgG2 325  158 - 761 mg/dL    IgG3 47  24 - 192 mg/dL    IgG4 18  11 - 86 mg/dL   SUNNY ANTIBODY PANEL       Component Value Range    Ribonucleic Protein IgG Antibody 0      Smith Antibody IgG 1      SSA (RO) Antibody IgG 4      SSB (LA) Antibody IgG 0      Scleroderma Antibody IgG 0     BETA 2 GLYCOPROTEIN ANTIBODIES IGG IGM       Component Value Range    Beta-2-Glycopro IgG 1      Beta-2-Glycopro IgM 5     CYCLIC CIT PEPT IGG       Component Value Range    Cyclic Cit Pept IgG/IgA    <20 UNITS    Value: <20      Interpretation:  Negative   DNA DOUBLE STRANDED ANTIBODIES       Component Value Range    DNA-ds    0 - 29 IU/mL    Value: <15      Interpretation:  Negative       CT CHEST PULMONARY EMBOLISM W CONTRAST 5/20/2015 4:57 PM  HISTORY: Pain. SOB. Elevated d-dimer.   TECHNIQUE: 65 mL Isovue 370. Axial images with coronal  reconstructions.  COMPARISON: None.  FINDINGS: Calcified granuloma with surrounding scarring in the  posterolateral left upper lobe. Triangular shaped opacity at the right  lung base in the lateral right middle lobe could represent some  scarring, atelectasis or infiltrate. There is also some scarring or  atelectasis in the posteromedial right lung base. Lungs otherwise  clear.  The pulmonary arteries are well opacified. No CT evidence for acute  pulmonary embolus. No aortic dissection.  Diffuse fatty infiltration of the liver.  IMPRESSION  IMPRESSION:   1. No pulmonary embolus identified.  2. Small focus of atelectasis, infiltrate or scarring in the lateral  right middle lobe.  3. Old granulomatous  "disease.  4. Otherwise negative.  SILVERIO VAZQUEZ MD    Copath Report      Patient Name: FAVIO MARTINEZ   MR#: [id number]   Specimen #: [id number]   Collected: 3/15/2016   Received: 3/15/2016   Reported: 3/17/2016 13:33   Ordering Phy(s): PARVEEN ENRIQUEZ     ORIGINAL REPORT FOLLOWS ADDENDUM  ADDENDUM     TO ORIGINAL REPORT   Status: Signed Out   Date Ordered:3/18/2016   Date Complete:3/18/2016   Date Reported:3/18/2016 12:06   Signed Out By: Marianne Godfrey MD     INTERPRETATION:   This addendum is done to incorporate the results of fungal (GMS) stains   done on the specimen.  Specimen is negative for fungal organisms.  The   original final diagnosis remains unchanged.     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  Negative for malignancy(Please   see microscopic description)     Electronically signed out by:     Marianne Godfrey MD     CLINICAL HISTORY:   right orbital mass     GROSS:   The specimen is received labeled \"right orbital biopsy\" and consists of   red-tan nodule measuring 1.5 x 0.9 x 0.6 cm with one smooth side and   opposite rough side consistent with periosteum.  The specimen is   bisected revealing homogenous pale tan fleshy cut surface.  Touch   preparations are prepared, air dried and fixed, portion of specimen is   submitted in RPMI for possible lymphoma workup [facility],   ([facility], [place], [place] ).  The remainder is entirely submitted.   (Dictated by: Marianne Godfrey MD 3/15/2016 04:45 PM)     MICROSCOPIC:     Specimen consists of portion of lacrimal gland with acute and chronic   inflammation.  Focal area of microabscess formation associated with   small areas of necrosis are also present.  Inflammation is seen   extending to the periorbital adipose tissue " forming panniculitis.   Specimen is negative for malignancy.  Samples sent for immunophenotyping   to [facility], ([facility], [place], [place] ) reveals no evidence   of monoclonality or aberrant antigen expression.  A GMS (fungal) stain   is pending and results will be reported as an addendum.     CPT Codes:   A: 44425-AN7, 42341-CIPYA, SOH, 79690-TSYJZ, 83852-SQZL     TESTING LAB LOCATION:   [address]  [address] [phone]     COLLECTION SITE:   Client: [facility]   Location: [facility] (S)            Component      Latest Ref Rng 4/29/2016   Nucleated RBCs      0 /100 0   Absolute Neutrophil      1.6 - 8.3 10e9/L 8.9 (H)   Absolute Lymphocytes      0.8 - 5.3 10e9/L 3.2   Absolute Monocytes      0.0 - 1.3 10e9/L 0.8   Absolute Eosinophils      0.0 - 0.7 10e9/L 0.2   Absolute Basophils      0.0 - 0.2 10e9/L 0.1   Abs Immature Granulocytes      0 - 0.4 10e9/L 0.1   Absolute Nucleated RBC       0.0   IGG      695 - 1620 mg/dL 836   IgG1      300 - 856 mg/dL 280 (L)   IgG2      158 - 761 mg/dL 277   IgG3      24 - 192 mg/dL 35   IgG4      11 - 86 mg/dL 16   RNP Antibody IgG      0.0 - 0.9 AI <0.2 . . .   Smith SUNNY Antibody IgG      0.0 - 0.9 AI <0.2 . . .   SSA (Ro) (SUNNY) Antibody, IgG      0.0 - 0.9 AI <0.2 . . .   SSB (La) (SUNNY) Antibody, IgG      0.0 - 0.9 AI <0.2 . . .   Scleroderma Antibody Scl-70 SUNNY IgG      0.0 - 0.9 AI <0.2 . . .   Cholesterol      <200 mg/dL 154   Triglycerides      <150 mg/dL 220 (H)   HDL Cholesterol      >49 mg/dL 42 (L)   LDL Cholesterol Calculated      <100 mg/dL 67   Non HDL Cholesterol      <130 mg/dL 111   M Tuberculosis Result      NEG Negative   M Tuberculosis Antigen Value       0.00   Albumin      3.4 - 5.0 g/dL 4.3   ALT      0 - 50 U/L 30   AST      0 - 45 U/L 10   Complement C3      76 - 169 mg/dL 157   Complement C4      15 - 50 mg/dL 32   CRP Inflammation      0.0 - 8.0 mg/L <2.9   Sed Rate       0 - 20 mm/h 2   DNA-ds      <10 IU/mL 1   Cyclic Citrullinated Peptide Antibody, IgG      <7 U/mL 1   Rheumatoid Factor      <20 IU/mL <20   Proteinase 3 Antibody IgG      0.0 - 0.9 AI <0.2 . . .   Myeloperoxidase Antibody IgG      0.0 - 0.9 AI 2.5 (H)   Vitamin D Deficiency screening      20 - 75 ug/L 24   Hemoglobin A1C      4.3 - 6.0 % 7.9 (H)   ALLI by IFA IgG       1:40 (A) . . .     Component      Latest Ref Rng & Units 1/16/2021   WBC      4.0 - 11.0 10e9/L    RBC Count      3.8 - 5.2 10e12/L    Hemoglobin      11.7 - 15.7 g/dL    Hematocrit      35.0 - 47.0 %    MCV      78 - 100 fl    MCH      26.5 - 33.0 pg    MCHC      31.5 - 36.5 g/dL    RDW      10.0 - 15.0 %    Platelet Count      150 - 450 10e9/L    Diff Method          % Neutrophils      %    % Lymphocytes      %    % Monocytes      %    % Eosinophils      %    % Basophils      %    % Immature Granulocytes      %    Nucleated RBCs      0 /100    Absolute Neutrophil      1.6 - 8.3 10e9/L    Absolute Lymphocytes      0.8 - 5.3 10e9/L    Absolute Monocytes      0.0 - 1.3 10e9/L    Absolute Eosinophils      0.0 - 0.7 10e9/L    Absolute Basophils      0.0 - 0.2 10e9/L    Abs Immature Granulocytes      0 - 0.4 10e9/L    Absolute Nucleated RBC          IGG      610 - 1,616 mg/dL    IGA      84 - 499 mg/dL    IGM      35 - 242 mg/dL    Creatinine      0.52 - 1.04 mg/dL    GFR Estimate      >60 mL/min/1.73:m2    GFR Estimate If Black      >60 mL/min/1.73:m2    COVID-19 Virus PCR to [facility] - Source       Nasopharyngeal   COVID-19 Virus PCR to [facility] - Result       Not Detected   Neutrophil Cytoplasmic Antibody      <1:10 titer    Neutrophil Cytoplasmic Antibody Pattern          CD19 B Cells      6 - 27 %    Absolute CD19      107 - 698 cells/uL    Myeloperoxidase Antibody IgG      0.0 - 0.9 AI    Proteinase 3 Antibody IgG      0.0 - 0.9 AI    Vitamin D Deficiency screening      20 - 75 ug/L    Sed Rate      0 - 30 mm/h    CRP Inflammation      0.0 - 8.0 mg/L     Albumin      3.4 - 5.0 g/dL    ALT      0 - 50 U/L    AST      0 - 45 U/L      Component      Latest Ref Rng & Units 5/7/2021   WBC      4.0 - 11.0 10e9/L 8.9   RBC Count      3.8 - 5.2 10e12/L 4.89   Hemoglobin      11.7 - 15.7 g/dL 12.7   Hematocrit      35.0 - 47.0 % 39.7   MCV      78 - 100 fl 81   MCH      26.5 - 33.0 pg 26.0 (L)   MCHC      31.5 - 36.5 g/dL 32.0   RDW      10.0 - 15.0 % 13.7   Platelet Count      150 - 450 10e9/L 336   Diff Method       Automated Method   % Neutrophils      % 65.3   % Lymphocytes      % 20.7   % Monocytes      % 7.8   % Eosinophils      % 5.3   % Basophils      % 0.7   % Immature Granulocytes      % 0.2   Nucleated RBCs      0 /100 0   Absolute Neutrophil      1.6 - 8.3 10e9/L 5.8   Absolute Lymphocytes      0.8 - 5.3 10e9/L 1.8   Absolute Monocytes      0.0 - 1.3 10e9/L 0.7   Absolute Eosinophils      0.0 - 0.7 10e9/L 0.5   Absolute Basophils      0.0 - 0.2 10e9/L 0.1   Abs Immature Granulocytes      0 - 0.4 10e9/L 0.0   Absolute Nucleated RBC       0.0   IGG      610 - 1,616 mg/dL 649   IGA      84 - 499 mg/dL 199   IGM      35 - 242 mg/dL 36   Creatinine      0.52 - 1.04 mg/dL 0.96   GFR Estimate      >60 mL/min/1.73:m2 66   GFR Estimate If Black      >60 mL/min/1.73:m2 77   COVID-19 Virus PCR to [facility] - Source          COVID-19 Virus PCR to [facility] - Result          Neutrophil Cytoplasmic Antibody      <1:10 titer <1:10   Neutrophil Cytoplasmic Antibody Pattern       The ANCA IFA is <1:10.  No further testing will be performed.   CD19 B Cells      6 - 27 % <1 (L)   Absolute CD19      107 - 698 cells/uL <1 (L)   Myeloperoxidase Antibody IgG      0.0 - 0.9 AI 1.1 (H)   Proteinase 3 Antibody IgG      0.0 - 0.9 AI <0.2   Vitamin D Deficiency screening      20 - 75 ug/L 41   Sed Rate      0 - 30 mm/h 9   CRP Inflammation      0.0 - 8.0 mg/L <2.9   Albumin      3.4 - 5.0 g/dL 4.1   ALT      0 - 50 U/L 28   AST      0 - 45 U/L 18     Lab on 07/08/2022   Component Date Value  Ref Range Status    Sodium 07/08/2022 143  133 - 144 mmol/L Final    Potassium 07/08/2022 3.9  3.4 - 5.3 mmol/L Final    Chloride 07/08/2022 108  94 - 109 mmol/L Final    Carbon Dioxide (CO2) 07/08/2022 25  20 - 32 mmol/L Final    Anion Gap 07/08/2022 10  3 - 14 mmol/L Final    Urea Nitrogen 07/08/2022 17  7 - 30 mg/dL Final    Creatinine 07/08/2022 1.01  0.52 - 1.04 mg/dL Final    Calcium 07/08/2022 9.3  8.5 - 10.1 mg/dL Final    Glucose 07/08/2022 103 (A) 70 - 99 mg/dL Final    GFR Estimate 07/08/2022 65  >60 mL/min/1.73m2 Final    Effective December 21, 2021 eGFRcr in adults is calculated using the 2021 CKD-EPI creatinine equation which includes age and gender (Ryan et al., [facility], DOI: 10.1056/GGDQpy9725183)    WBC Count 07/08/2022 12.0 (A) 4.0 - 11.0 10e3/uL Final    RBC Count 07/08/2022 4.94  3.80 - 5.20 10e6/uL Final    Hemoglobin 07/08/2022 12.6  11.7 - 15.7 g/dL Final    Hematocrit 07/08/2022 39.6  35.0 - 47.0 % Final    MCV 07/08/2022 80  78 - 100 fL Final    MCH 07/08/2022 25.5 (A) 26.5 - 33.0 pg Final    MCHC 07/08/2022 31.8  31.5 - 36.5 g/dL Final    RDW 07/08/2022 13.6  10.0 - 15.0 % Final    Platelet Count 07/08/2022 350  150 - 450 10e3/uL Final    % Neutrophils 07/08/2022 66  % Final    % Lymphocytes 07/08/2022 22  % Final    % Monocytes 07/08/2022 9  % Final    % Eosinophils 07/08/2022 3  % Final    % Basophils 07/08/2022 0  % Final    % Immature Granulocytes 07/08/2022 0  % Final    NRBCs per 100 WBC 07/08/2022 0  <1 /100 Final    Absolute Neutrophils 07/08/2022 7.9  1.6 - 8.3 10e3/uL Final    Absolute Lymphocytes 07/08/2022 2.7  0.8 - 5.3 10e3/uL Final    Absolute Monocytes 07/08/2022 1.1  0.0 - 1.3 10e3/uL Final    Absolute Eosinophils 07/08/2022 0.3  0.0 - 0.7 10e3/uL Final    Absolute Basophils 07/08/2022 0.1  0.0 - 0.2 10e3/uL Final    Absolute Immature Granulocytes 07/08/2022 0.0  <=0.4 10e3/uL Final    Absolute NRBCs 07/08/2022 0.0  10e3/uL Final     Component      Latest Ref Rng & Units  8/19/2022   WBC      4.0 - 11.0 10e3/uL 12.6 (H)   RBC Count      3.80 - 5.20 10e6/uL 5.06   Hemoglobin      11.7 - 15.7 g/dL 12.8   Hematocrit      35.0 - 47.0 % 40.4   MCV      78 - 100 fL 80   MCH      26.5 - 33.0 pg 25.3 (L)   MCHC      31.5 - 36.5 g/dL 31.7   RDW      10.0 - 15.0 % 14.1   Platelet Count      150 - 450 10e3/uL 387   % Neutrophils      % 71   % Lymphocytes      % 20   % Monocytes      % 6   % Eosinophils      % 3   % Basophils      % 0   % Immature Granulocytes      % 0   NRBCs per 100 WBC      <1 /100 0   Absolute Neutrophils      1.6 - 8.3 10e3/uL 8.8 (H)   Absolute Lymphocytes      0.8 - 5.3 10e3/uL 2.5   Absolute Monocytes      0.0 - 1.3 10e3/uL 0.8   Absolute Eosinophils      0.0 - 0.7 10e3/uL 0.4   Absolute Basophils      0.0 - 0.2 10e3/uL 0.1   Absolute Immature Granulocytes      <=0.4 10e3/uL 0.1   Absolute NRBCs      10e3/uL 0.0   Color Urine      Colorless, Straw, Light Yellow, Yellow Straw   Appearance Urine      Clear Clear   Glucose Urine      Negative mg/dL 1000 (A)   Bilirubin Urine      Negative Negative   Ketones Urine      Negative mg/dL Negative   Specific Gravity Urine      1.003 - 1.035 1.020   Blood Urine      Negative Negative   pH Urine      5.0 - 7.0 5.0   Protein Albumin Urine      Negative mg/dL Negative   Urobilinogen mg/dL      Normal, 2.0 mg/dL Normal   Nitrite Urine      Negative Negative   Leukocyte Esterase Urine      Negative Negative   RBC Urine      <=2 /HPF 1   WBC Urine      <=5 /HPF 5   Squamous Epithelial /HPF Urine      <=1 /HPF <1   MPO Marline IgG Instrument Value      <3.5 U/mL 0.7   Myeloperoxidase Antibody IgG      Negative Negative   Proteinase 3 Marline IgG Instrument Value      <2.0 U/mL <1.0   Proteinase 3 Antibody IgG      Negative Negative   Protein Random Urine      g/L 0.11   Protein Total Urine g/gr Creatinine      0.00 - 0.20 g/g Cr 0.09   Creatinine Urine      mg/dL 128   IGG      610 - 1,616 mg/dL 641   IGA      84 - 499 mg/dL 204   IGM      35 -  242 mg/dL 32 (L)   Creatinine      0.52 - 1.04 mg/dL 1.24 (H)   GFR Estimate      >60 mL/min/1.73m2 51 (L)   CD19 B Cells      6 - 27 % <1 (L)   Absolute CD19      107 - 698 cells/uL <1 (L)   Neutrophil Cytoplasmic Antibody      <1:10 <1:10   Neutrophil Cytoplasmic Antibody Pattern       The ANCA IFA is <1:10.  No further testing will be performed.   AST      0 - 45 U/L 16   ALT      0 - 50 U/L 34   Albumin      3.4 - 5.0 g/dL 4.2   CRP Inflammation      0.0 - 8.0 mg/L 9.1 (H)   Sed Rate      0 - 30 mm/hr 15   Vitamin D Deficiency screening      20 - 75 ug/L 36     Component      Latest Ref Rng & Units 1/19/2023   WBC      4.0 - 11.0 10e3/uL 16.1 (H)   RBC Count      3.80 - 5.20 10e6/uL 5.39 (H)   Hemoglobin      11.7 - 15.7 g/dL 13.4   Hematocrit      35.0 - 47.0 % 41.9   MCV      78 - 100 fL 78   MCH      26.5 - 33.0 pg 24.9 (L)   MCHC      31.5 - 36.5 g/dL 32.0   RDW      10.0 - 15.0 % 15.4 (H)   Platelet Count      150 - 450 10e3/uL 383   % Neutrophils      % 79   % Lymphocytes      % 16   % Monocytes      % 4   % Eosinophils      % 1   % Basophils      % 0   % Immature Granulocytes      % 0   NRBCs per 100 WBC      <1 /100 0   Absolute Neutrophils      1.6 - 8.3 10e3/uL 12.6 (H)   Absolute Lymphocytes      0.8 - 5.3 10e3/uL 2.6   Absolute Monocytes      0.0 - 1.3 10e3/uL 0.7   Absolute Eosinophils      0.0 - 0.7 10e3/uL 0.2   Absolute Basophils      0.0 - 0.2 10e3/uL 0.1   Absolute Immature Granulocytes      <=0.4 10e3/uL 0.1   Absolute NRBCs      10e3/uL 0.0   Sodium      136 - 145 mmol/L 137   Potassium      3.4 - 5.3 mmol/L 4.0   Chloride      98 - 107 mmol/L 98   Carbon Dioxide (CO2)      22 - 29 mmol/L 25   Anion Gap      7 - 15 mmol/L 14   Urea Nitrogen      6.0 - 20.0 mg/dL 23.6 (H)   Creatinine      0.51 - 0.95 mg/dL 1.09 (H)   Calcium      8.6 - 10.0 mg/dL 10.3 (H)   Glucose      70 - 99 mg/dL 232 (H)   Alkaline Phosphatase      35 - 104 U/L 95   AST      10 - 35 U/L 23   ALT      10 - 35 U/L 26    Protein Total      6.4 - 8.3 g/dL 7.7   Albumin      3.5 - 5.2 g/dL 4.9   Bilirubin Total      <=1.2 mg/dL 0.3   GFR Estimate      >60 mL/min/1.73m2 59 (L)   Color Urine      Colorless, Straw, Light Yellow, Yellow Light Yellow   Appearance Urine      Clear Clear   Glucose Urine      Negative mg/dL >=1000 (A)   Bilirubin Urine      Negative Negative   Ketones Urine      Negative mg/dL 10 (A)   Specific Gravity Urine      1.003 - 1.035 1.033   Blood Urine      Negative Negative   pH Urine      5.0 - 7.0 6.0   Protein Albumin Urine      Negative mg/dL Negative   Urobilinogen mg/dL      Normal, 2.0 mg/dL Normal   Nitrite Urine      Negative Negative   Leukocyte Esterase Urine      Negative Negative   Iron      37 - 145 ug/dL 51   Iron Binding Capacity      240 - 430 ug/dL 362   Iron Sat Index      15 - 46 % 14 (L)   Parathyroid Hormone Intact      15 - 65 pg/mL 51   Calcium Ionized Whole Blood      4.4 - 5.2 mg/dL 4.9   Ferritin      11 - 328 ng/mL 70   TSH      0.30 - 4.20 uIU/mL 0.40   3 views cervical spine radiographs 2/10/2023 4:10 PM     History: neck pain; Neck pain     Comparison: MRI cervical spine 4/14/2015.     Findings:  AP, lateral, and odontoid views of the cervical spine were obtained.     Down to the level of C7 is well visualized on the lateral view(s). The  cervicothoracic junction is partially visualized.     There is no acute osseous abnormality. No prevertebral soft tissue  swelling. Normal cervical lordosis is maintained.       Moderate loss of intervertebral disc height at C6-7. Mild loss of disc  height at C5-6. Additional multilevel degenerative changes including  endplate osteophytosis and uncinate hypertrophy. Dens is obscured by  the overlying maxilla on the odontoid view.     The visualized lung apices are clear.                                                                      Impression:  Multilevel cervical degenerative changes, greatest at C6-7.     I have personally reviewed the  examination and initial interpretation  and I agree with the findings.     TASHI KELLY MD      Component      Latest Ref Rng & Units 2/10/2023   WBC      4.0 - 11.0 10e3/uL 11.9 (H)   RBC Count      3.80 - 5.20 10e6/uL 5.30 (H)   Hemoglobin      11.7 - 15.7 g/dL 13.2   Hematocrit      35.0 - 47.0 % 40.7   MCV      78 - 100 fL 77 (L)   MCH      26.5 - 33.0 pg 24.9 (L)   MCHC      31.5 - 36.5 g/dL 32.4   RDW      10.0 - 15.0 % 15.3 (H)   Platelet Count      150 - 450 10e3/uL 415   % Neutrophils      % 69   % Lymphocytes      % 23   % Monocytes      % 6   % Eosinophils      % 2   % Basophils      % 0   % Immature Granulocytes      % 0   NRBCs per 100 WBC      <1 /100 0   Absolute Neutrophils      1.6 - 8.3 10e3/uL 8.1   Absolute Lymphocytes      0.8 - 5.3 10e3/uL 2.7   Absolute Monocytes      0.0 - 1.3 10e3/uL 0.8   Absolute Eosinophils      0.0 - 0.7 10e3/uL 0.2   Absolute Basophils      0.0 - 0.2 10e3/uL 0.0   Absolute Immature Granulocytes      <=0.4 10e3/uL 0.0   Absolute NRBCs      10e3/uL 0.0   Sodium      136 - 145 mmol/L 138   Potassium      3.4 - 5.3 mmol/L 4.1   Chloride      98 - 107 mmol/L 99   Carbon Dioxide (CO2)      22 - 29 mmol/L 23   Anion Gap      7 - 15 mmol/L 16 (H)   Urea Nitrogen      6.0 - 20.0 mg/dL 24.6 (H)   Creatinine      0.51 - 0.95 mg/dL 1.07 (H)   Calcium      8.6 - 10.0 mg/dL 10.5 (H)   Glucose      70 - 99 mg/dL 205 (H)   Alkaline Phosphatase      35 - 104 U/L 86   AST      10 - 35 U/L 26   ALT      10 - 35 U/L 30   Protein Total      6.4 - 8.3 g/dL 7.6   Albumin      3.5 - 5.2 g/dL 4.8   Bilirubin Total      <=1.2 mg/dL 0.2   GFR Estimate      >60 mL/min/1.73m2 61   MPO Marline IgG Instrument Value      <3.5 U/mL 0.8   Myeloperoxidase Antibody IgG      Negative Negative   Proteinase 3 Marline IgG Instrument Value      <2.0 U/mL <1.0   Proteinase 3 Antibody IgG      Negative Negative   IGG      610 - 1,616 mg/dL 680   IGA      84 - 499 mg/dL 197   IGM      35 - 242 mg/dL 30 (L)   CD19 B  Cells      6 - 27 % <1 (L)   Absolute CD19      107 - 698 cells/uL <1 (L)   Neutrophil Cytoplasmic Antibody      <1:10 <1:10   Neutrophil Cytoplasmic Antibody Pattern       The ANCA IFA is <1:10.  No further testing will be performed.   % Retic      0.5 - 2.0 % 1.4   Absolute Retic      0.025 - 0.095 10e6/uL 0.073   CRP Inflammation      <5.00 mg/L 6.67 (H)   Sed Rate      0 - 30 mm/hr 12   Lipase      13 - 60 U/L 26     ROS: A comprehensive ROS was done. Positives are per HPI.      HISTORY REVIEW:  Past Medical History:   Diagnosis Date    Abnormal glandular Papanicolaou smear of cervix 1992    Allergic rhinitis     Allergy, airborne subst    Arthritis     ASCVD (arteriosclerotic cardiovascular disease)     Chronic pain     Chronic pancreatitis (H)     idiopathic, Tx: PPI, antioxidants, pancreatic enzymes    Common migraine     Coronary artery disease     Costochondritis     Difficulty in walking(719.7)     Dyspnea on exertion     Ectasia, mammary duct     [facility], persistent nipple discharge    Elevated fasting glucose     Gastro-oesophageal reflux disease     Granulomatosis with polyangiitis (H)     Hernia     History of angina     Hyperlipidemia LDL goal < 100     Hypertension goal BP (blood pressure) < 140/90     Essential hypertension    Iron deficiency anemia     Ischemic cardiomyopathy     Menorrhagia     Migraine headaches     Mild persistent asthma     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 Matias, neg w/u, Rx'd w/prednisone    Ventral hernia, unspecified, without mention of obstruction or gangrene 2012     Past Surgical History:   Procedure Laterality Date    C/SECTION, LOW TRANSVERSE  1996        CARDIAC SURGERY      cardiac stent- recent in 16; 4 other stents    COLONOSCOPY N/A 2023    Procedure: COLONOSCOPY, WITH POLYPECTOMY;  Surgeon: Percy Gross MD;  Location: [facility] OR    DILATION AND CURETTAGE N/A 2016    Procedure: DILATION AND CURETTAGE;  Surgeon: Nahed Butler MD;  [facility]    ENDOMETRIAL SAMPLING (BIOPSY) N/A 2023    Procedure: ENDOMETRIAL BIOPSY;  Surgeon: Nahed Butler MD;  [facility]    ESOPHAGOSCOPY, GASTROSCOPY, DUODENOSCOPY (EGD), COMBINED N/A 2022    Procedure: ESOPHAGOGASTRODUODENOSCOPY, WITH BIOPSY;  Surgeon: Enzo Sesay MD;  [facility]: [facility]    ESOPHAGOSCOPY, GASTROSCOPY, DUODENOSCOPY (EGD), COMBINED N/A 2023    Procedure: ESOPHAGOGASTRODUODENOSCOPY, WITH BIOPSY;  Surgeon: Percy Gross MD;  Location: [facility] OR    EXAM UNDER ANESTHESIA PELVIC N/A 2023    Procedure: EXAM UNDER ANESTHESIA;  Surgeon: Nahed Butler MD;  [facility]    HC UGI ENDOSCOPY W EUS  2008    Dr. Pastrana, possible chronic pancreatitis, fatty liver    HERNIA REPAIR  2012    [place] [facility] [place]    INSERT INTRAUTERINE DEVICE N/A 2016    Procedure: INSERT INTRAUTERINE DEVICE;  Surgeon: Nahed Butler MD;  [facility]    INT UTERINE FIBRIOD EMBOLIZATION  10/29/2014    LAPAROSCOPIC CHOLECYSTECTOMY  2008    Dr. Arnol GRUBBS TX, CERVICAL  1992    s/p LEERAHUL, done at [facility] in [place].    ORBITOTOMY Right 03/15/2016    Procedure: ORBITOTOMY;  Surgeon: Myron Cyr MD;  Location: [facility]    ORBITOTOMY Right  2017    Procedure: ORBITOTOMY;  RIGHT ORBITOTOMY AND BIOPSY;  Surgeon: Charis Holbrook MD;  Location:  [facility]    REMOVE INTRAUTERINE DEVICE N/A 2023    Procedure: Remove intrauterine device,;  Surgeon: Nahed Btuler MD;  [facility]    REPAIR PTOSIS Right 2017    Procedure: REPAIR PTOSIS;  RIGHT UPPER LID PTOSIS REPAIR;  Surgeon: Myron Cyr MD;  Location:  [facility]    UPPER GI ENDOSCOPY  10/21/2008    mild gastritis, Dr. Rocky CALDERA ECHO HEART XTHORACIC,COMPLETE, W/O DOPPLER  2004    [facility] WNL, EF 60%     Family History   Problem Relation Age of Onset    Hypertension Mother     Arthritis Mother     Heart Disease Mother         long qt syndrome    Thyroid Disease Mother     C.A.D. Mother     Heart Disease Father 50        heart attack    Cerebrovascular Disease Father     Diabetes Father     Hypertension Father     Depression Father     C.A.D. Father     Neurologic Disorder Sister         migraines    Neurologic Disorder Sister         migraines    Heart Disease Brother 15        MI at 15, 16.     Arthritis Brother         he passed away, had severe arthritis at age 11    C.A.D. Brother     Anesthesia Reaction Son         PONV    Respiratory Son         asthma    Hypertension Maternal Aunt     Diabetes Maternal Uncle     Hypertension Maternal Uncle     Arthritis Maternal Uncle     Eye Disorder Maternal Uncle         cataracts    Cerebrovascular Disease Maternal Uncle     Family History Negative Other         neg for RA, SLE    Unknown/Adopted No family hx of         unknown neurological issues in her family, mother was adopted    Skin Cancer No family hx of         No known family hx of skin cancer    Deep Vein Thrombosis (DVT) No family hx of      Social History     Socioeconomic History    Marital status: Single     Spouse name: Not on file    Number of children: 1    Years of education: Not on file    Highest education level: Not on file   Occupational  History     Employer: NONE    Tobacco Use    Smoking status: Some Days     Current packs/day: 0.00     Average packs/day: 0.2 packs/day for 1 year (0.2 ttl pk-yrs)     Types: Cigarettes     Start date: 2015     Last attempt to quit: 2016     Years since quittin.3    Smokeless tobacco: Never   Vaping Use    Vaping status: Every Day    Substances: Nicotine   Substance and Sexual Activity    Alcohol use: No     Alcohol/week: 0.0 standard drinks of alcohol    Drug use: No    Sexual activity: Not Currently   Other Topics Concern    Parent/sibling w/ CABG, MI or angioplasty before 65F 55M? Yes   Social History Narrative    Balanced Diet - sometimes    Osteoporosis Prevention Measures - Dairy servings per day: 2 servings weekly    Regular Exercise -  Yes Describe walking 4 times a week    Dental Exam - NO    Seatbelts used - Yes    Self Breast Exam - Yes    Abuse: Current or Past (Physical, Sexual or Emotional)- No    Do you have any concerns about STD's -  No    Do you feel safe in your environment - No    Guns stored in the home - No    Sunscreen used - Yes    Lipids -  YES - Date:     Glucose -  YES - Date:     Eye Exam - YES - Date: one year ago    Colon Cancer Screening - No    Hemoccults - NO    Pap Test -  YES - Date: , remote history of LEEP    Mammography - YES - Date: last spring, would like to discuss, needs a referral to [facility]    DEXA - NO    Immunizations reviewed and up to date - Yes, last td given in  or      Social Determinants of Health     Financial Resource Strain: Low Risk  (2024)    Financial Resource Strain     Within the past 12 months, have you or your family members you live with been unable to get utilities (heat, electricity) when it was really needed?: No   Food Insecurity: High Risk (2024)    Food Insecurity     Within the past 12 months, did you worry that your food would run out before you got money to buy more?: Yes      Within the past 12 months, did the food you bought just not last and you didn t have money to get more?: No   Transportation Needs: Low Risk  (2/9/2024)    Transportation Needs     Within the past 12 months, has lack of transportation kept you from medical appointments, getting your medicines, non-medical meetings or appointments, work, or from getting things that you need?: No   Physical Activity: Not on file   Stress: Not on file   Social Connections: Not on file   Interpersonal Safety: Low Risk  (2/9/2024)    Interpersonal Safety     Do you feel physically and emotionally safe where you currently live?: Yes     Within the past 12 months, have you been hit, slapped, kicked or otherwise physically hurt by someone?: No     Within the past 12 months, have you been humiliated or emotionally abused in other ways by your partner or ex-partner?: No   Housing Stability: Low Risk  (2/9/2024)    Housing Stability     Do you have housing? : Yes     Are you worried about losing your housing?: No     Patient Active Problem List   Diagnosis    Seasonal affective disorder (H24)    Allergic rhinitis    PCOS (polycystic ovarian syndrome)    Moderate persistent asthma    Chronic pancreatitis (H)    Hypertension goal BP (blood pressure) < 140/90    Common migraine    NSTEMI (non-ST elevated myocardial infarction) (H)    Hyperlipidemia LDL goal <70    ASCVD (arteriosclerotic cardiovascular disease)    GERD (gastroesophageal reflux disease)    Ischemic cardiomyopathy    Hypertensive heart disease    Uterine leiomyoma    Iron deficiency anemia    Costochondritis    Vitamin D deficiency    Breast cancer screening    Spinal stenosis in cervical region    Fibromyalgia    Seronegative rheumatoid arthritis (H)    Type 2 diabetes, HbA1C goal < 8% (H)    Type 2 diabetes mellitus with other specified complication (H)    Hyperlipidemia associated with type 2 diabetes mellitus (H)    Hypertension associated with diabetes (H)    Overweight with  body mass index (BMI) 25.0-29.9    Tobacco use disorder    Telogen effluvium    CAD S/P percutaneous coronary angioplasty    Status post coronary angiogram    ANCA-associated vasculitis (H)    Wegener's vasculitis    Type 1 diabetes mellitus with complications (H)    Chest discomfort    Urinary frequency    Abdominal pain, epigastric    Hypokalemia    Leukocytosis, unspecified type    Acute pancreatitis, unspecified complication status, unspecified pancreatitis type       Pregnancy Hx: She is . All miscarriages were in first trimester. H/o OC use in the past. 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    PMHx, FHx, SHx were reviewed, unchanged.    Outpatient Encounter Medications as of 2024   Medication Sig Dispense Refill    acetaminophen (TYLENOL) 325 MG tablet Take 1-2 tablets (325-650 mg) by mouth every 6 hours as needed for pain (headache) 250 tablet 4    ADVAIR DISKUS 250-50 MCG/ACT inhaler Inhale 1 puff into the lungs 2 times daily      albuterol (2.5 MG/3ML) 0.083% neb solution INL 1 VIAL VIA NEBULIZATION Q 4 TO 6 HOURS PRN  1    albuterol (PROAIR HFA, PROVENTIL HFA, VENTOLIN HFA) 108 (90 BASE) MCG/ACT inhaler Inhale 2 puffs into the lungs every 6 hours as needed for shortness of breath / dyspnea or wheezing 3 Inhaler 1    BANOPHEN 25 MG tablet Take 25 mg by mouth At Bedtime      blood glucose (NO BRAND SPECIFIED) lancets standard Use to test blood sugar 1-4 times daily or as directed. 400 each 3    blood glucose (NO BRAND SPECIFIED) test strip Use to test blood sugar fasting each am and predinner daily. 250 strip 3    blood glucose monitoring (NO BRAND SPECIFIED) meter device kit Use to test blood sugar 2 times daily or as directed. 1 kit 0    Blood Pressure Monitor KIT 1 each daily Monitor home blood pressure as instructed by physician.  Dispense [place] blood pressure kit. 1 kit 0    calcium carbonate (TUMS) 500 MG chewable tablet Take 3-4 tablets (1,500-2,000 mg) by mouth daily as  needed 90 tablet 3    clopidogrel (PLAVIX) 75 MG tablet Take 1 tablet (75 mg) by mouth daily . 30 tablet 6    clotrimazole (MYCELEX) 10 MG lozenge Place 1 lozenge (10 mg) inside cheek 5 times daily 50 lozenge 1    cyclobenzaprine (FLEXERIL) 10 MG tablet Take 1 tablet (10 mg) by mouth 2 times daily as needed for muscle spasms 60 tablet 3    cycloSPORINE (RESTASIS) 0.05 % ophthalmic emulsion Place 1 drop into both eyes 2 times daily 5.5 mL 11    cycloSPORINE (RESTASIS) 0.05 % ophthalmic emulsion 1 month supply 11 refills 1 each 11    dicyclomine (BENTYL) 20 MG tablet TAKE 1 TABLET(20 MG) BY MOUTH FOUR TIMES DAILY AS NEEDED. 60 tablet 4    empagliflozin (JARDIANCE) 25 MG TABS tablet Take 1 tablet (25 mg) by mouth daily 90 tablet 2    EPIPEN 2-RIKY 0.3 MG/0.3ML injection INJECT 0.3 MG INTO THE MUSCLE PRF ANAPHYALAXIS  0    esomeprazole (NEXIUM) 40 MG DR capsule Take 1 capsule (40 mg) by mouth 2 times daily Take 30-60 minutes before eating. 180 capsule 0    estradiol (VAGIFEM) 10 MCG TABS vaginal tablet Place 1 tablet (10 mcg) vaginally twice a week 24 tablet 3    fluticasone (FLONASE) 50 MCG/ACT nasal spray Spray 1 spray in nostril as needed      folic acid (FOLVITE) 1 MG tablet Take 1 tablet (1 mg) by mouth daily 90 tablet 0    hydrocortisone (CORTAID) 1 % external cream Apply topically 2 times daily (Patient taking differently: Apply topically as needed) 60 g 1    insulin glargine (LANTUS PEN) 100 UNIT/ML pen Inject 56 Units Subcutaneous every morning Or as directed. 75 mL 1    insulin pen needle (BD PEN NEEDLE MARCK 2ND GEN) 32G X 4 MM miscellaneous Use one pen needle daily or as directed. 100 each 3    ketoconazole (NIZORAL) 2 % shampoo Apply topically daily as needed      levocetirizine (XYZAL) 5 MG tablet Take 5 mg by mouth as needed      lisinopril-hydrochlorothiazide (ZESTORETIC) 20-25 MG tablet Take 1 tablet by mouth daily 30 tablet 6    LORazepam (ATIVAN) 0.5 MG tablet Take 1 tablet 30-60 minutes before your  scheduled MRI 1 tablet 0    magnesium 250 MG tablet TAKE 1 TABLET(250 MG) BY MOUTH TWICE DAILY 60 tablet 3    metFORMIN (GLUCOPHAGE XR) 500 MG 24 hr tablet Take 4 tablets (2,000 mg) by mouth daily (with dinner) 360 tablet 3    metoprolol succinate ER (TOPROL XL) 100 MG 24 hr tablet Take 1 tablet (100 mg) by mouth daily 30 tablet 6    Multiple Vitamin (TAB-A-GERALD) TABS Take 1 tablet by mouth daily 90 tablet 1    nitroGLYcerin (NITROSTAT) 0.4 MG sublingual tablet For chest pain place 1 tablet under the tongue every 5 minutes for 3 doses. If symptoms persist 5 minutes after 1st dose call 911. 30 tablet 11    nitroGLYcerin (NITROSTAT) 0.4 MG sublingual tablet Place 1 tablet (0.4 mg) under the tongue every 5 minutes as needed for chest pain . After 3 doses, if pain persists call 911. 25 tablet 3    olopatadine (PATANOL) 0.1 % ophthalmic solution Place 1 drop into both eyes as needed      ondansetron (ZOFRAN ODT) 8 MG ODT tab TAKE 1 TABLET BY MOUTH EVERY 8 HOURS AS NEEDED FOR NAUSEA 20 tablet 1    pravastatin (PRAVACHOL) 40 MG tablet Take 1 tablet (40 mg) by mouth daily 30 tablet 6    pregabalin (LYRICA) 25 MG capsule TAKE 1 CAPSULE BY MOUTH EVERY DAY 30 capsule 1    prochlorperazine (COMPAZINE) 5 MG tablet Take 1 tablet (5 mg) by mouth every 6 hours as needed for nausea or vomiting 60 tablet 3    sennosides (SENOKOT) 8.6 MG tablet 1-2 tabs a day as needed for constipation 60 tablet 1    spironolactone (ALDACTONE) 25 MG tablet Take 1 tablet (25 mg) by mouth daily. May also take 0.5 tablets (12.5 mg) daily as needed (for weight gain). 45 tablet 6    sucralfate (CARAFATE) 1 GM tablet Take 1 tablet (1 g) by mouth 4 times daily 120 tablet 3    terbinafine (LAMISIL) 1 % external cream Apply topically 2 times daily (Patient taking differently: Apply topically as needed) 42 g 1    tiZANidine (ZANAFLEX) 4 MG tablet Take 1 tablet (4 mg) by mouth 3 times daily as needed for muscle spasms 21 tablet 3    traMADol (ULTRAM) 50 MG  tablet TAKE 1 TABLET(50 MG) BY MOUTH EVERY 8 HOURS AS NEEDED FOR SEVERE PAIN 60 tablet 3    vitamin D3 (CHOLECALCIFEROL) 50 mcg (2000 units) tablet Take 1 tablet (50 mcg) by mouth daily 90 tablet 1    vitamin D3 (CHOLECALCIFEROL) 50 mcg (2000 units) tablet Take 1 tablet (50 mcg) by mouth daily 90 tablet 0     No facility-administered encounter medications on file as of 6/5/2024.       Allergies   Allergen Reactions    Amoxicillin-Pot Clavulanate      Unknown possible hives and edema    Amoxicillin-Pot Clavulanate     Artificial Sweetner (Informational Only)  Other (See Comments)     Increased headache    Azithromycin     Clavulanic Acid     Diatrizoate Other (See Comments)     Pt wants this listed because she is allergic to shellfish     Imitrex [Triptans]      Severe face/neck/chest tightness and flushing side effects     Penicillins Hives     Unknown     Pork Allergy      Stomach pain, cramping, diarrhea, itching, nausea and headaches    Shellfish Allergy Hives and Swelling    Shellfish-Derived Products     Sulfa Antibiotics Hives and Swelling    Sulfatolamide     Zithromax [Azithromycin Dihydrate] Swelling     Unknown        Ph.E:    /81 (BP Location: Right arm, Cuff Size: Adult Regular)   Pulse 73   Wt 73.9 kg (163 lb)   LMP  (LMP Unknown)   BMI 28.87 kg/m      GA: NAD, pleasant  HEENT: nl conj, sclera, EOMI. No campos-orbital edema under R eye  Chest: CTAB  CV: no M/R/G, RRR  Abdomen: soft, NT  MSK: + joint tenderness over B/L 2nd/3rd MCPs, no synovitis  Skin: no rash  Neuro: non focal  Psych: nl affect    Assessment/Plan:    1-Seropositive non-erosive RA (arthritis, AM stiffness, high CRP, RF 26 but re-check neg 3/2015 on HCQ) Dx 5/2013, FMS, new pleuritic CP 3/20-15-5/2015 resolved on steroids. She is on  mg bid since 5/2014. She tolerates it well and it's helping some but not enough to control all her pain. Continued having flare ups of joint pain, could be GPA related. Some pain is due to  FMS.My impression is that her arthralgias are a combination of RA/ IA sec GPA, fibromyalgia and chronic pain.     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 Prednisone was not a good option for her given weight gain, diabetes. She tried and did not tolerate MTX, AZA.    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 Repeat MPO was positive in 6/2017.    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 Shah and Dr. Valdez at [facility]. 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     She is on rituximab since 12/12/2018 with great response, minor allergic reaction which could be managed by pre-meds and extra steroid dose. Developed high BG and vaginal yeast infections after solumedrol premed. Treated candida with fluconazole. Will decrease solumerdol dose to IV 80 mg prior to receiving rituximab. 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 However, feels Rituximab effects wear off around 4 months. According to ACR guidelines can give every 4-6 months. Pt elected to received this upcoming September which will be approximately 5 months from last dose. Repeat Orbit CT in September 2021 demonstrated improvement.     Last visit in April 2022-- Recommended marie given b cell depletion tx as rituximab blocks the response to covid vaccine, she is concerned about side effects. I advised her to discuss with [facility] pharmacist.      1/25/2023: Janine has been ill since Dx of COVID on 12/17/2022. Her most recent labs were reviewed, stable vasculitis labs in 8/2022 with CD19<1, neg vasculitis markers. In 1/2023, no anemia and nl thyroid function test. I ordered vit D as add on. 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 She does need to follow up on abnormal thyroid US and mammogram as well. Our plan for ANCA vasculitis was to give rituximab 1 gram [id number] as benefit did not last 6 months with last rituximab on 9/7/2022, but today we both agreed to give next dose in March after 6 months to let her body heal from COVID. I will see her in person, in early march to determine if it is ok to give another dose of rituxiamb. 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 Also ordered shower chair, commmandi per her request given significant fatigue, body pain.    3/9/2023: S/p last rituximab 1 gram on 9/7/2022. 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 Stable labs and eye inflammation.    2-Fat pads. She was seen by endocrinology and cushing was ruled out in 4/2014. Was advised to do f/u for enlarged thyroid/thyroid nodules.  3-Hair loss. Continue with dermatology  4-Chronic pain. F/U with pain clinic  5-Vit D deficiency. Was replaced with vit D 50, 000 units po qwk x 12 wk then 2000 units every day  6-?HCQ toxicity on OCT 7/2021, now off HCQ, could explain increased arthralgia  7- Chronic Headaches. Symptoms worsen after taking zofran. Has received compazine in hospital in the past without adverse effect. Will have patient trial Compazine       3/2023 plan:    Spine referral for abnormal C spine (DJD) in 2/2023.    Rituximab 1 gram one dose only this month    Labs in May 2023    Follow up eye exam 8/2023    Return in person in about 5-6 months       9/6/2023: last rituximab 1 gram one dose for ANCA vasculitis on 3/28/2023, increased arthralgia, will try rituximab at full dose, zanaflex and consider resuming HCQ as last eye exam did rule out HCQ toxicity (HCQ was stopped with concern for possible HCQ toxicity).    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 Will schedule rituximab in Feb, pain mx was discussed. Labs are stable.    S/p rituximab 1 gram on 9/19/2023, 10/3/2023.    Plan:      Rituximab 1 gram every 2 weeks x 2 infusion in Feb for ANCA vasculitis    Labs with next rituximab infusion    X-rays today    Acupuncture referral    Return in person in about 4 months    Orders Placed This Encounter   Procedures    CT Orbits wo Contrast    AST    ALT    Myeloperoxidase and Proteinase 3 Panel    Albumin level    Creatinine    CRP inflammation    UA with Microscopic reflex to Culture    Protein  random urine    Creatinine random urine    Erythrocyte sedimentation rate auto    CD19 B Cell Count    ANCA IgG by IFA with Reflex to Titer    Immunoglobulins A G and M    Med Therapy Management Referral    CBC with Platelets & Differential       6/5/2024: more joint pain, rituximab does not last 6 months, will move up appointment. Will check orbit CT. Stable labs. I asked her PCP to help with pain mx.    Labs today    Move up rituximab 1 gram once from Aug to June 2024, our pharmacist will contact you    CT orbit with no contrast    Return in about 3-4 months in person    Majo Mckinnon MD

## 2024-06-05 NOTE — NURSING NOTE
"Chief Complaint   Patient presents with    RECHECK     4 month follow-up      Vital signs:      BP: 121/81 Pulse: 73             Weight: 73.9 kg (163 lb)  Estimated body mass index is 28.87 kg/m  as calculated from the following:    Height as of 3/15/24: 1.6 m (5' 3\").    Weight as of this encounter: 73.9 kg (163 lb).      Beulah Fortune CMA   6/5/2024 11:33 AM    "

## 2024-06-05 NOTE — PATIENT INSTRUCTIONS
Labs today    Move up rituximab 1 gram once from Aug to June 2024, our pharmacist will contact you    CT orbit with no contrast    Return in about 3-4 months in person

## 2024-06-05 NOTE — PROGRESS NOTES
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)  Will see her back soon, plan rvw mds then likely increase insulin while waits to see Endo again  - Hemoglobin A1c; Future    Hypertension associated with diabetes (H)  Cont as is    Low back pain due to bilateral sciatica  Chronic w/ sciatica limiting daily comfort and function such as walking  - MR Lumbar Spine w/o Contrast; Future    Cervical radiculopathy  Chronic w/ radiular sx limiting daily comfort and function such as house work  - MR Cervical Spine w/o Contrast; Future  - MR THORACIC SPINE W/O CONTRAST; Future    Cervicalgia    - MR Cervical Spine w/o Contrast; Future  - MR THORACIC SPINE W/O CONTRAST; Future    Bilateral arm weakness    - MR Cervical Spine w/o Contrast; Future  - MR THORACIC SPINE W/O CONTRAST; Futurelong discussion today role of MR and/or EMG in nerve pain eval and different common conditions that can cause neuralgias.          Nicotine/Tobacco Cessation  She reports that she has been smoking cigarettes. She started smoking about 9 years ago. She has a 0.2 pack-year smoking history. She has never used smokeless tobacco.  Nicotine/Tobacco Cessation Plan  We agree discuss at upcoming vist        The longitudinal plan of care for the diagnosis(es)/condition(s) as documented were addressed during this visit. Due to the added complexity in care, I will continue to support Janine in the subsequent management and with ongoing continuity of care.  41 minutes spent by me on the date of the encounter doing chart review, history and exam, documentation and further activities per the note    No follow-ups on file.    Subjective   Janine is a 58 year old, presenting for the following health issues:  Follow Up      6/5/2024    12:15 PM   Additional Questions   Roomed by [facility]     History of Present Illness       Reason for visit:  See Rheum notes    She eats 4 or more servings of fruits and vegetables daily.She consumes 0 sweetened  beverage(s) daily.She exercises with enough effort to increase her heart rate 20 to 29 minutes per day.  She exercises with enough effort to increase her heart rate 3 or less days per week.   She is taking medications regularly.   Here in follow-up  Pain focus of discussion  Neck, mid, low back pain  Chronic  Neuralgias all four ext: pain, numb, sense of weakness  Known previous abnl C spine    Discussed what MR vs EMG can show; remotely had EMG would like to avoid  Rheum notes rvwd  Provider notes and studies since my last visit rvwd in visit  Past Medical History:   Diagnosis Date    Abnormal glandular Papanicolaou smear of cervix 1992    Allergic rhinitis     Allergy, airborne subst    Arthritis     ASCVD (arteriosclerotic cardiovascular disease)     Chronic pain     Chronic pancreatitis (H)     idiopathic, Tx: PPI, antioxidants, pancreatic enzymes    Common migraine     Coronary artery disease     Costochondritis     Difficulty in walking(719.7)     Dyspnea on exertion     Ectasia, mammary duct     [facility], [facility] n[facility] discharge    Elevated fasting glucose     Gastro-oesophageal reflux disease     Granulomatosis with polyangiitis (H)     Hernia     History of angina     Hyperlipidemia LDL goal < 100     Hypertension goal BP (blood pressure) < 140/90     Essential hypertension    Iron deficiency anemia     Ischemic cardiomyopathy     Menorrhagia     Migraine headaches     Mild persistent asthma     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 Matias, neg w/u, Rx'd w/prednisone    Ventral hernia, unspecified, without mention of obstruction or gangrene 2012     Past Surgical History:   Procedure Laterality Date    C/SECTION, LOW TRANSVERSE  1996        CARDIAC SURGERY      cardiac stent- recent in 16; 4 other stents    COLONOSCOPY N/A 2023    Procedure: COLONOSCOPY, WITH POLYPECTOMY;  Surgeon: Percy Gross MD;  Location: [facility] OR    DILATION AND CURETTAGE N/A 2016    Procedure: DILATION AND CURETTAGE;  Surgeon: Nahed Butler MD;  [facility]    ENDOMETRIAL SAMPLING (BIOPSY) N/A 2023    Procedure: ENDOMETRIAL BIOPSY;  Surgeon: Nahed Butler MD;  [facility]    ESOPHAGOSCOPY, GASTROSCOPY, DUODENOSCOPY (EGD), COMBINED N/A 2022    Procedure: ESOPHAGOGASTRODUODENOSCOPY, WITH BIOPSY;  Surgeon: Enzo Sesay MD;  Location: [facility]    ESOPHAGOSCOPY, GASTROSCOPY, DUODENOSCOPY (EGD), COMBINED N/A 2023    Procedure: ESOPHAGOGASTRODUODENOSCOPY, WITH BIOPSY;  Surgeon: Pecry Gross MD;  Location: [facility] OR    EXAM UNDER ANESTHESIA PELVIC N/A 2023    Procedure: EXAM UNDER ANESTHESIA;  Surgeon: Nahed Butler MD;  [facility]    HC UGI ENDOSCOPY W EUS  2008    Dr. Pastrana, possible chronic pancreatitis, fatty liver    HERNIA REPAIR  2012    done at [facility]    INSERT INTRAUTERINE DEVICE N/A 2016    Procedure: INSERT INTRAUTERINE DEVICE;  Surgeon: Nahed Butler MD;  [facility]    INT UTERINE FIBRIOD EMBOLIZATION  10/29/2014    LAPAROSCOPIC CHOLECYSTECTOMY  2008    Dr. Arnol GRUBBS TX, CERVICAL  1992    s/p LEERAHUL, done at [facility] in [place].    ORBITOTOMY Right 03/15/2016    Procedure: ORBITOTOMY;  Surgeon: Myron Cyr  MD Selvin;  Location: [facility]    ORBITOTOMY Right 08/04/2017    Procedure: ORBITOTOMY;  RIGHT ORBITOTOMY AND BIOPSY;  Surgeon: Charis Holbrook MD;  Location: [facility]    REMOVE INTRAUTERINE DEVICE N/A 4/28/2023    Procedure: Remove intrauterine device,;  Surgeon: Nahed Butler MD;  [facility]    REPAIR PTOSIS Right 11/17/2017    Procedure: REPAIR PTOSIS;  RIGHT UPPER LID PTOSIS REPAIR;  Surgeon: Myron Cyr MD;  Location: [facility]    UPPER GI ENDOSCOPY  10/21/2008    mild gastritis, Dr. Rocky [facility] ECHO HEART XTHORACIC,COMPLETE, W/O DOPPLER  02/04/2004    [facility] WNL, EF 60%     Current Outpatient Medications   Medication Sig Dispense Refill    acetaminophen (TYLENOL) 325 MG tablet Take 1-2 tablets (325-650 mg) by mouth every 6 hours as needed for pain (headache) 250 tablet 4    ADVAIR DISKUS 250-50 MCG/ACT inhaler Inhale 1 puff into the lungs 2 times daily      albuterol (2.5 MG/3ML) 0.083% neb solution INL 1 VIAL VIA NEBULIZATION Q 4 TO 6 HOURS PRN  1    albuterol (PROAIR HFA, PROVENTIL HFA, VENTOLIN HFA) 108 (90 BASE) MCG/ACT inhaler Inhale 2 puffs into the lungs every 6 hours as needed for shortness of breath / dyspnea or wheezing 3 Inhaler 1    BANOPHEN 25 MG tablet Take 25 mg by mouth At Bedtime      blood glucose (NO BRAND SPECIFIED) lancets standard Use to test blood sugar 1-4 times daily or as directed. 400 each 3    blood glucose (NO BRAND SPECIFIED) test strip Use to test blood sugar fasting each am and predinner daily. 250 strip 3    blood glucose monitoring (NO BRAND SPECIFIED) meter device kit Use to test blood sugar 2 times daily or as directed. 1 kit 0    Blood Pressure Monitor KIT 1 each daily Monitor home blood pressure as instructed by physician.  Dispense [place] blood pressure kit. 1 kit 0    calcium carbonate (TUMS) 500 MG chewable tablet Take 3-4 tablets (1,500-2,000 mg) by mouth daily as needed 90 tablet 3    clopidogrel (PLAVIX) 75 MG tablet Take 1 tablet (75 mg)  by mouth daily . 30 tablet 6    clotrimazole (MYCELEX) 10 MG lozenge Place 1 lozenge (10 mg) inside cheek 5 times daily 50 lozenge 1    cyanocobalamin (VITAMIN B-12) 1000 MCG tablet Take 1 tablet by mouth daily at 2 pm      cyclobenzaprine (FLEXERIL) 10 MG tablet Take 1 tablet (10 mg) by mouth 2 times daily as needed for muscle spasms 60 tablet 3    cycloSPORINE (RESTASIS) 0.05 % ophthalmic emulsion 1 month supply 11 refills 1 each 11    dicyclomine (BENTYL) 20 MG tablet TAKE 1 TABLET(20 MG) BY MOUTH FOUR TIMES DAILY AS NEEDED. 60 tablet 4    empagliflozin (JARDIANCE) 25 MG TABS tablet Take 1 tablet (25 mg) by mouth daily 90 tablet 2    EPIPEN 2-RIKY 0.3 MG/0.3ML injection INJECT 0.3 MG INTO THE MUSCLE PRF ANAPHYALAXIS  0    esomeprazole (NEXIUM) 40 MG DR capsule Take 1 capsule (40 mg) by mouth 2 times daily Take 30-60 minutes before eating. 180 capsule 0    estradiol (VAGIFEM) 10 MCG TABS vaginal tablet Place 1 tablet (10 mcg) vaginally twice a week 24 tablet 3    fluticasone (FLONASE) 50 MCG/ACT nasal spray Spray 1 spray in nostril as needed      folic acid (FOLVITE) 1 MG tablet Take 1 tablet (1 mg) by mouth daily 90 tablet 0    insulin glargine (LANTUS PEN) 100 UNIT/ML pen Inject 56 Units Subcutaneous every morning Or as directed. 75 mL 1    insulin pen needle (BD PEN NEEDLE MARCK 2ND GEN) 32G X 4 MM miscellaneous Use one pen needle daily or as directed. 100 each 3    lisinopril-hydrochlorothiazide (ZESTORETIC) 20-25 MG tablet Take 1 tablet by mouth daily 30 tablet 6    magnesium 250 MG tablet TAKE 1 TABLET(250 MG) BY MOUTH TWICE DAILY 60 tablet 3    metFORMIN (GLUCOPHAGE XR) 500 MG 24 hr tablet Take 4 tablets (2,000 mg) by mouth daily (with dinner) 360 tablet 3    metoprolol succinate ER (TOPROL XL) 100 MG 24 hr tablet Take 1 tablet (100 mg) by mouth daily 30 tablet 6    Multiple Vitamin (TAB-A-GERALD) TABS Take 1 tablet by mouth daily 90 tablet 1    olopatadine (PATANOL) 0.1 % ophthalmic solution Place 1 drop into  both eyes as needed      ondansetron (ZOFRAN ODT) 8 MG ODT tab TAKE 1 TABLET BY MOUTH EVERY 8 HOURS AS NEEDED FOR NAUSEA 20 tablet 1    pravastatin (PRAVACHOL) 40 MG tablet Take 1 tablet (40 mg) by mouth daily 30 tablet 6    pregabalin (LYRICA) 25 MG capsule TAKE 1 CAPSULE BY MOUTH EVERY DAY 30 capsule 1    sennosides (SENOKOT) 8.6 MG tablet 1-2 tabs a day as needed for constipation 60 tablet 1    spironolactone (ALDACTONE) 25 MG tablet Take 1 tablet (25 mg) by mouth daily. May also take 0.5 tablets (12.5 mg) daily as needed (for weight gain). 45 tablet 6    sucralfate (CARAFATE) 1 GM tablet Take 1 tablet (1 g) by mouth 4 times daily 120 tablet 3    traMADol (ULTRAM) 50 MG tablet TAKE 1 TABLET(50 MG) BY MOUTH EVERY 8 HOURS AS NEEDED FOR SEVERE PAIN 60 tablet 3     No current facility-administered medications for this visit.     Allergies   Allergen Reactions    Amoxicillin-Pot Clavulanate      Unknown possible hives and edema    Amoxicillin-Pot Clavulanate     Artificial Sweetner (Informational Only)  Other (See Comments)     Increased headache    Azithromycin     Clavulanic Acid     Diatrizoate Other (See Comments)     Pt wants this listed because she is allergic to shellfish     Imitrex [Triptans]      Severe face/neck/chest tightness and flushing side effects     Penicillins Hives     Unknown     Pork Allergy      Stomach pain, cramping, diarrhea, itching, nausea and headaches    Shellfish Allergy Hives and Swelling    Shellfish-Derived Products     Sulfa Antibiotics Hives and Swelling    Sulfatolamide     Zithromax [Azithromycin Dihydrate] Swelling     Unknown      Family History   Problem Relation Age of Onset    Hypertension Mother     Arthritis Mother     Heart Disease Mother         long qt syndrome    Thyroid Disease Mother     C.A.D. Mother     Heart Disease Father 50        heart attack    Cerebrovascular Disease Father     Diabetes Father     Hypertension Father     Depression Father     C.A.D. Father      Neurologic Disorder Sister         migraines    Neurologic Disorder Sister         migraines    Heart Disease Brother 15        MI at 15, 16.     Arthritis Brother         he passed away, had severe arthritis at age 11    C.A.D. Brother     Anesthesia Reaction Son         PONV    Respiratory Son         asthma    Hypertension Maternal Aunt     Diabetes Maternal Uncle     Hypertension Maternal Uncle     Arthritis Maternal Uncle     Eye Disorder Maternal Uncle         cataracts    Cerebrovascular Disease Maternal Uncle     Family History Negative Other         neg for RA, SLE    Unknown/Adopted No family hx of         unknown neurological issues in her family, mother was adopted    Skin Cancer No family hx of         No known family hx of skin cancer    Deep Vein Thrombosis (DVT) No family hx of      Social History     Socioeconomic History    Marital status: Single     Spouse name: Not on file    Number of children: 1    Years of education: Not on file    Highest education level: Not on file   Occupational History     Employer: NONE    Tobacco Use    Smoking status: Some Days     Current packs/day: 0.00     Average packs/day: 0.2 packs/day for 1 year (0.2 ttl pk-yrs)     Types: Cigarettes     Start date: 2015     Last attempt to quit: 2016     Years since quittin.3    Smokeless tobacco: Never   Vaping Use    Vaping status: Every Day    Substances: Nicotine   Substance and Sexual Activity    Alcohol use: No     Alcohol/week: 0.0 standard drinks of alcohol    Drug use: No    Sexual activity: Not Currently   Other Topics Concern    Parent/sibling w/ CABG, MI or angioplasty before 65F 55M? Yes   Social History Narrative    Balanced Diet - sometimes    Osteoporosis Prevention Measures - Dairy servings per day: 2 servings weekly    Regular Exercise -  Yes Describe walking 4 times a week    Dental Exam - NO    Seatbelts used - Yes    Self Breast Exam - Yes    Abuse: Current or Past (Physical, Sexual  or Emotional)- No    Do you have any concerns about STD's -  No    Do you feel safe in your environment - No    Guns stored in the home - No    Sunscreen used - Yes    Lipids -  YES - Date: 053102    Glucose -  YES - Date: 012804    Eye Exam - YES - Date: one year ago    Colon Cancer Screening - No    Hemoccults - NO    Pap Test -  YES - Date: 070904, remote history of LEEP    Mammography - YES - Date: last spring, would like to discuss, needs a referral to [facility] center    DEXA - NO    Immunizations reviewed and up to date - Yes, last td given in 1997 or 1998     Social Determinants of Health     Financial Resource Strain: Low Risk  (2/9/2024)    Financial Resource Strain     Within the past 12 months, have you or your family members you live with been unable to get utilities (heat, electricity) when it was really needed?: No   Food Insecurity: High Risk (2/9/2024)    Food Insecurity     Within the past 12 months, did you worry that your food would run out before you got money to buy more?: Yes     Within the past 12 months, did the food you bought just not last and you didn t have money to get more?: No   Transportation Needs: Low Risk  (2/9/2024)    Transportation Needs     Within the past 12 months, has lack of transportation kept you from medical appointments, getting your medicines, non-medical meetings or appointments, work, or from getting things that you need?: No   Physical Activity: Not on file   Stress: Not on file   Social Connections: Not on file   Interpersonal Safety: Low Risk  (2/9/2024)    Interpersonal Safety     Do you feel physically and emotionally safe where you currently live?: Yes     Within the past 12 months, have you been hit, slapped, kicked or otherwise physically hurt by someone?: No     Within the past 12 months, have you been humiliated or emotionally abused in other ways by your partner or ex-partner?: No   Housing Stability: Low Risk  (2/9/2024)    Housing Stability     " Do you have housing? : Yes     Are you worried about losing your housing?: No             Objective    /79 (BP Location: Right arm, Patient Position: Sitting, Cuff Size: Adult Large)   Pulse 74   Ht 1.6 m (5' 3\")   Wt 74 kg (163 lb 3.2 oz)   LMP  (LMP Unknown)   SpO2 97%   BMI 28.91 kg/m    Body mass index is 28.91 kg/m .  Physical Exam   GENERAL: alert and no distress  Does have pain w/ neck, back ROM beyond 1/2   All four ext n/v intact  MS: no gross musculoskeletal defects noted, no edema            Signed Electronically by: Kash Solano MD    "

## 2024-06-05 NOTE — TELEPHONE ENCOUNTER
Left Voicemail (1st Attempt) for the patient to call back and schedule the following:    Appointment type: imaging  Provider: imaging  Return date: soonest avail    Additonal Notes: Pt needs 3MRIs scheduled on the same day as CT, then move appointment with Dr. Solano for after imaging

## 2024-06-05 NOTE — PROGRESS NOTES
Rheumatology F/U In Person Visit Note    Last visit note: 1/31/2024    Today's visit date: 6/5/2024    Reason for in person visit: F/U GPA    HPI     Ms. 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 She is s/p tx with HCQ since 5/2014, now off due to abnormal eye exam. On rituximab since 12/20218 with great response. Previously tried and did not tolerate MTX, AZA.    She had lateral orbitotomy and debulking orbital mass right eye on 9/26/18 with Dr. Shah and Dr. Valdez at [place]. 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 There were no complications according to the op note.    Today 6/5/2024:    -reports arm weakness    -has back pain    -continues to have joint pain    -has more headaches, R eye pain    -neuropathy is an issue     1/31/2024:      Nortriptyline was prescribed by another provider, but has not tried it yet    R side of her face started swelling again.    Reports pain over neck, hips, knees and hands.          9/6/2023:    -reports pain/swelling over hands/feet, has more arthritis problem over past year  -gets numbness over toes, bottom of feet, can't see neurology till 1/2024  -walking causes pain in the feet  -gets pain over hips after a trip to [facility], it is a new problem  -sometimes gets swelling over R cheek, noticed it yesterday outside with heat, her face was also bright red        3/9/2023: Reports pain/swelling of her hands. S/p last rituximab 1 gram on 9/7/2022.       1/25/2023: Janine is doing a phone visit for follow up, was feeling ill and switched in person to phone visit. She got sick around Thanksgiving, saw her PCP on 12/17, was tested positive for COVID, it took a longtime to feel better, did not take paxlovid as it was already past 2 weeks. Since then, she has not been feeling well. Has headaches, body ache, her eyes especially R eye look pink, they burn and itch  a lot. Has sense of smell but can not taste her food. She is very tired, hardly leaves her house. Last time, left house for doctor visit, was tired and sore. Has tingling and pinprick feeling over arms and legs. Has upcoming follow up with PCP on 2/10. She had thyroid US for thyroid nodules. She had abnormal screening mammogram on 1/9, will go back for diagnostic mammogram and US of L breast on 1/31.          08/19/2022  Reports fatigue and headaches. Headache worsens after taking Zofran for nausea. Joint symptoms come and go. Last rituximab on 4/6/22. Rituximab is helpful but feels like it wears off prior to the 6 months. Next appointment scheduled for October 2022. Develops intermittent vaginal yeast infection and thrush related to rising blood glucose. Right eye without symptoms. No new fevers, chills, skin changes. Son is wondering if she takes herbal supplements for headaches will this interact with any of her medications. Scheduled to meet with pharmacist today.        4/22/2022:   Each rituximab infusion causes vaginal yeast infection and thrush related to rise in BG. Her BG has stayed in higher level since last rituximab. Has more ache and pain, myalgia and arthralgia. Recently has had headaches with high BP. Had last rituximab 1000 mg on 4/6.Her R eye is itching and swelling up.  Today, her BG is 177.Has gained 20 lbs since Feb 2022. Had severe pain in her arm after covid vaccine, it took a month to get better.      12/16/2021: Janine presents for follow up. Reports ESOB with cold, has ongoing joint pain. R eye pain is intermittent.  Reports headaches after rituximab 1 gram on 10/18/2021.  Last week, her R knee was hurting.      8/20/2021: Janine has pain over arms, knees. Some days, feel stiff all day long. Some days can't do stairs. Hard to tell if there is swelling as has more water retention during summer.  Some days, eye swells up like today. No fevers, SOB, CP or cough.  Has rosacea but some days wakes up  with heat rash over sun. Her face is peeling.  Sometimes can't lift things or grab things with pain from elbow to hands. Has shoulder and neck pain but this forearm pain is new.  Stopped HCQ in mid July due to concern for potential HCQ toxicity on OCT, her eye exam with Dr. Vernon has been re-scheduled and upcoming on 9/14 to address HCQ toxicity, pain is not worse off HCQ.  Not COVID vaccinated but is cautious.      5/10/2021: Joint ache, knees hurt, R elbow hurts, R arm hurts, R hand hurts. Has lots of swelling in her joints especially hands.    Depending on weather, joints jhurt, sometimes pain is 7/10.  Has problem with taking stairs and balance.  Gets off balance.   R eye gets tinglin, swelling.  Gets out of breath, gets worse with seasonal allergies.  Over past month and half, took nitro more, like 8 times.  No hemooptysis, no hematuria.  Has allergies.      4/21/2021: Had last rituximab 1 gram on 1/19, 2/2/2021. Reports rituximab starts to wear off earlier, has more sx this time. Eye swelling is intermittent. Gets indigestion/ GERD sx with constipation after the infusion.  She reports having asthma, swelling of neck, arms. She thinks that it could be from her vasculitis. She is worried about going down on rituximab dose.   Otherwise unchanged sx, no SOB or hemoptysis or persistent cough, or   Somedays her knees and hips hurt a lot, feel like bone on bone in her knees, especially on changing position.      Component      Latest Ref Rng 2/28/2013 2/28/2013          12:14 PM 12:14 PM   WBC      4.0 - 11.0 10e9/L     RBC Count      3.8 - 5.2 10e12/L     Hemoglobin      11.7 - 15.7 g/dL     Hematocrit      35.0 - 47.0 %     MCV      78 - 100 fl     MCH      26.5 - 33.0 pg     MCHC      31.5 - 36.5 g/dL     RDW      10.0 - 15.0 %     Platelet Count      150 - 450 10e9/L     Specimen Description           Lyme Screen IgG and IgM           Vitamin D Deficiency screening      30 - 75 ug/L     Ferritin      10 - 300 ng/mL      Sed Rate      0 - 20 mm/h     ALLI Screen by EIA      <1.0     Rheumatoid Factor      0 - 14 IU/mL     CK Total      30 - 225 U/L  78   Uric Acid      2.5 - 7.5 mg/dL 6.7      Component      Latest Ref Rng 2/28/2013          12:14 PM   WBC      4.0 - 11.0 10e9/L    RBC Count      3.8 - 5.2 10e12/L    Hemoglobin      11.7 - 15.7 g/dL    Hematocrit      35.0 - 47.0 %    MCV      78 - 100 fl    MCH      26.5 - 33.0 pg    MCHC      31.5 - 36.5 g/dL    RDW      10.0 - 15.0 %    Platelet Count      150 - 450 10e9/L    Specimen Description       Serum   Lyme Screen IgG and IgM       Test value: <0.75....Interpretation: Negative....If you highly suspect Lyme . . .   Vitamin D Deficiency screening      30 - 75 ug/L    Ferritin      10 - 300 ng/mL    Sed Rate      0 - 20 mm/h    ALLI Screen by EIA      <1.0    Rheumatoid Factor      0 - 14 IU/mL    CK Total      30 - 225 U/L    Uric Acid      2.5 - 7.5 mg/dL      Component      Latest Ref Rng 2/28/2013 2/28/2013 2/28/2013 2/28/2013          12:14 PM 12:14 PM 12:14 PM 12:14 PM   WBC      4.0 - 11.0 10e9/L       RBC Count      3.8 - 5.2 10e12/L       Hemoglobin      11.7 - 15.7 g/dL       Hematocrit      35.0 - 47.0 %       MCV      78 - 100 fl       MCH      26.5 - 33.0 pg       MCHC      31.5 - 36.5 g/dL       RDW      10.0 - 15.0 %       Platelet Count      150 - 450 10e9/L       Specimen Description             Lyme Screen IgG and IgM             Vitamin D Deficiency screening      30 - 75 ug/L       Ferritin      10 - 300 ng/mL    10   Sed Rate      0 - 20 mm/h   23 (H)    ALLI Screen by EIA      <1.0  <1.0 . . .     Rheumatoid Factor      0 - 14 IU/mL 26 (H)      CK Total      30 - 225 U/L       Uric Acid      2.5 - 7.5 mg/dL         5/2013  CBC WITH PLATELETS DIFFERENTIAL       Component Value Range    WBC 11.3 (*) 4.0 - 11.0 10e9/L    RBC Count 4.56  3.8 - 5.2 10e12/L    Hemoglobin 11.1 (*) 11.7 - 15.7 g/dL    Hematocrit 34.3 (*) 35.0 - 47.0 %    MCV 75 (*) 78 - 100 fl     MCH 24.3 (*) 26.5 - 33.0 pg    MCHC 32.4  31.5 - 36.5 g/dL    RDW 16.1 (*) 10.0 - 15.0 %    Platelet Count 315  150 - 450 10e9/L    Diff Method Automated Method      % Neutrophils 71.6  40 - 75 %    % Lymphocytes 20.9  20 - 48 %    % Monocytes 4.3  0 - 12 %    % Eosinophils 2.8  0 - 6 %    % Basophils 0.2  0 - 2 %    % Immature Granulocytes 0.2  0 - 0.4 %    Absolute Neutrophil 8.1  1.6 - 8.3 10e9/L    Absolute Lymphocytes 2.4  0.8 - 5.3 10e9/L    Absolute Monoctyes 0.5  0.0 - 1.3 10e9/L    Absolute Eosinophils 0.3  0.0 - 0.7 10e9/L    Absolute Basophils 0.0  0.0 - 0.2 10e9/L    Abs Immature Granulocytes 0.0  0 - 0.03 10e9/L   AMYLASE       Component Value Range    Amylase 103  30 - 110 U/L   COMPREHENSIVE METABOLIC PANEL       Component Value Range    Sodium 144  133 - 144 mmol/L    Potassium 3.8  3.4 - 5.3 mmol/L    Chloride 105  94 - 109 mmol/L    Carbon Dioxide 23  20 - 32 mmol/L    Anion Gap 17  6 - 17 mmol/L    Glucose 173 (*) 60 - 99 mg/dL    Urea Nitrogen 13  5 - 24 mg/dL    Creatinine 0.83  0.52 - 1.04 mg/dL    GFR Estimate 74  >60 mL/min/1.7m2    GFR Estimate If Black 90  >60 mL/min/1.7m2    Calcium 9.7  8.5 - 10.4 mg/dL    Bilirubin Total 0.4  0.2 - 1.3 mg/dL    Albumin 4.3  3.9 - 5.1 g/dL    Protein Total 7.8  6.8 - 8.8 g/dL    Alkaline Phosphatase 66  40 - 150 U/L    ALT 36  0 - 50 U/L    AST 28  0 - 45 U/L   CK TOTAL       Component Value Range    CK Total 66  30 - 225 U/L   CRP INFLAMMATION       Component Value Range    CRP Inflammation 10.4 (*) 0.0 - 8.0 mg/L   LIPASE       Component Value Range    Lipase 353 (*) 20 - 250 U/L   ERYTHROCYTE SEDIMENTATION RATE AUTO       Component Value Range    Sed Rate 26 (*) 0 - 20 mm/h   ROUTINE UA WITH MICROSCOPIC REFLEX TO CULTURE       Component Value Range    Color Urine Yellow      Appearance Urine Slightly Cloudy      Glucose Urine 30 (*) NEG mg/dL    Bilirubin Urine Negative  NEG    Ketones Urine 5 (*) NEG mg/dL    Specific Gravity Urine 1.026  1.003 -  1.035    Blood Urine Trace (*) NEG    pH Urine 5.0  5.0 - 7.0 pH    Protein Albumin Urine 10 (*) NEG mg/dL    Urobilinogen mg/dL Normal  0.0 - 2.0 mg/dL    Nitrite Urine Negative  NEG    Leukocyte Esterase Urine Negative  NEG    Source Midstream Urine      WBC Urine 1  0 - 2 /HPF    RBC Urine 4 (*) 0 - 2 /HPF    Squamous Epithelial /HPF Urine <1  0 - 1 /HPF    Mucous Urine Present (*) NEG /LPF    Hyaline Casts 3 (*) 0 - 2 /LPF    Calcium Oxalate Moderate (*) NEG /HPF   COMPLEMENT C3       Component Value Range    Complement C3 143  76 - 169 mg/dL   COMPLEMENT C4       Component Value Range    Complement C4 31  15 - 50 mg/dL   HEPATITIS C ANTIBODY       Component Value Range    Hepatitis C Antibody Negative  NEG   CARDIOLIPIN ANTIBODY IGG AND IGM       Component Value Range    Cardiolipin IgG Marline    0 - 15.0 GPL    Value: <15.0      Interpretation:  Negative    Cardiolipin IgM Marline    0 - 12.5 MPL    Value: <12.5      Interpretation:  Negative   LUPUS PANEL       Component Value Range    Lupus Result    NEG    Value: Negative      (Note)      COMMENTS:      The INR is normal.      APTT is normal.  1:2 Mix is not indicated.      DRVVT Screen is normal.      Thrombin time is normal.      NEGATIVE TEST; A LUPUS ANTICOAGULANT WAS NOT DETECTED IN THIS      SPECIMEN WITHIN THE LIMITS OF THE TESTING REPERTOIRE.      If the clinical picture is strongly suggestive of an antiphospholipid      syndrome, recommend anticardiolipin and beta-2-glycoprotein (IgG and      IgM) antibody tests.      Leela Franks M.D.  [phone]      5/2/2013            INR =  0.93 N = 0.86-1.14  (No additional charge)      TT = 15.7 N = 13.0-19.0 sec  (No additional charge)            APTT'S:    Seconds      Reagent =  Stago LA      Normal  =  38      Patient  =  34      1:2 Mix  =  N/A      Reference =  31-43             DILUTE MADELINE VIPER VENOM TEST:      DRVVT Screen Ratio = 0.76 Normal = <1.21         IMMUNOGLOBULIN G SUBCLASSES        Component Value Range    IGG 1030  695 - 1620 mg/dL    IgG1 488  300 - 856 mg/dL    IgG2 325  158 - 761 mg/dL    IgG3 47  24 - 192 mg/dL    IgG4 18  11 - 86 mg/dL   SUNNY ANTIBODY PANEL       Component Value Range    Ribonucleic Protein IgG Antibody 0      Smith Antibody IgG 1      SSA (RO) Antibody IgG 4      SSB (LA) Antibody IgG 0      Scleroderma Antibody IgG 0     BETA 2 GLYCOPROTEIN ANTIBODIES IGG IGM       Component Value Range    Beta-2-Glycopro IgG 1      Beta-2-Glycopro IgM 5     CYCLIC CIT PEPT IGG       Component Value Range    Cyclic Cit Pept IgG/IgA    <20 UNITS    Value: <20      Interpretation:  Negative   DNA DOUBLE STRANDED ANTIBODIES       Component Value Range    DNA-ds    0 - 29 IU/mL    Value: <15      Interpretation:  Negative       CT CHEST PULMONARY EMBOLISM W CONTRAST 5/20/2015 4:57 PM  HISTORY: Pain. SOB. Elevated d-dimer.   TECHNIQUE: 65 mL Isovue 370. Axial images with coronal  reconstructions.  COMPARISON: None.  FINDINGS: Calcified granuloma with surrounding scarring in the  posterolateral left upper lobe. Triangular shaped opacity at the right  lung base in the lateral right middle lobe could represent some  scarring, atelectasis or infiltrate. There is also some scarring or  atelectasis in the posteromedial right lung base. Lungs otherwise  clear.  The pulmonary arteries are well opacified. No CT evidence for acute  pulmonary embolus. No aortic dissection.  Diffuse fatty infiltration of the liver.  IMPRESSION  IMPRESSION:   1. No pulmonary embolus identified.  2. Small focus of atelectasis, infiltrate or scarring in the lateral  right middle lobe.  3. Old granulomatous disease.  4. Otherwise negative.  SILVERIO VAZQUEZ MD    Copath Report      Patient Name: FAVIO MARTINEZ   MR#: [id number]   Specimen #: [id number]   Collected: 3/15/2016   Received: 3/15/2016   Reported: 3/17/2016 13:33   Ordering Phy(s): PARVEEN ENRIQUEZ     ORIGINAL REPORT FOLLOWS ADDENDUM  ADDENDUM     TO ORIGINAL  "REPORT   Status: Signed Out   Date Ordered:3/18/2016   Date Complete:3/18/2016   Date Reported:3/18/2016 12:06   Signed Out By: Marianne Godfrey MD     INTERPRETATION:   This addendum is done to incorporate the results of fungal (GMS) stains   done on the specimen.  Specimen is negative for fungal organisms.  The   original final diagnosis remains unchanged.     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  Negative for malignancy(Please   see microscopic description)     Electronically signed out by:     Marianne Godfrey MD     CLINICAL HISTORY:   right orbital mass     GROSS:   The specimen is received labeled \"right orbital biopsy\" and consists of   red-tan nodule measuring 1.5 x 0.9 x 0.6 cm with one smooth side and   opposite rough side consistent with periosteum.  The specimen is   bisected revealing homogenous pale tan fleshy cut surface.  Touch   preparations are prepared, air dried and fixed, portion of specimen is   submitted in RPMI for possible lymphoma workup [facility],   ([facility], [place], [place] ).  The remainder is entirely submitted.   (Dictated by: Marianne Godfrey MD 3/15/2016 04:45 PM)     MICROSCOPIC:     Specimen consists of portion of lacrimal gland with acute and chronic   inflammation.  Focal area of microabscess formation associated with   small areas of necrosis are also present.  Inflammation is seen   extending to the periorbital adipose tissue forming panniculitis.   Specimen is negative for malignancy.  Samples sent for immunophenotyping   to [facility], ([employer] [facility], [place], [place] ) reveals no evidence   of monoclonality or aberrant antigen expression.  A GMS (fungal) stain   is pending and results will be reported as an addendum.     CPT Codes:   A: " 61103-YI6, 94650-WVDLW, SOH, 57188-MLJPI, 30028-MUVO     TESTING LAB LOCATION:   [address]  [address] [phone]     COLLECTION SITE:   Client: [facility]   Location: [facility] (S)            Component      Latest Ref Rng 4/29/2016   Nucleated RBCs      0 /100 0   Absolute Neutrophil      1.6 - 8.3 10e9/L 8.9 (H)   Absolute Lymphocytes      0.8 - 5.3 10e9/L 3.2   Absolute Monocytes      0.0 - 1.3 10e9/L 0.8   Absolute Eosinophils      0.0 - 0.7 10e9/L 0.2   Absolute Basophils      0.0 - 0.2 10e9/L 0.1   Abs Immature Granulocytes      0 - 0.4 10e9/L 0.1   Absolute Nucleated RBC       0.0   IGG      695 - 1620 mg/dL 836   IgG1      300 - 856 mg/dL 280 (L)   IgG2      158 - 761 mg/dL 277   IgG3      24 - 192 mg/dL 35   IgG4      11 - 86 mg/dL 16   RNP Antibody IgG      0.0 - 0.9 AI <0.2 . . .   Smith SUNNY Antibody IgG      0.0 - 0.9 AI <0.2 . . .   SSA (Ro) (SUNNY) Antibody, IgG      0.0 - 0.9 AI <0.2 . . .   SSB (La) (SUNNY) Antibody, IgG      0.0 - 0.9 AI <0.2 . . .   Scleroderma Antibody Scl-70 SUNNY IgG      0.0 - 0.9 AI <0.2 . . .   Cholesterol      <200 mg/dL 154   Triglycerides      <150 mg/dL 220 (H)   HDL Cholesterol      >49 mg/dL 42 (L)   LDL Cholesterol Calculated      <100 mg/dL 67   Non HDL Cholesterol      <130 mg/dL 111   M Tuberculosis Result      NEG Negative   M Tuberculosis Antigen Value       0.00   Albumin      3.4 - 5.0 g/dL 4.3   ALT      0 - 50 U/L 30   AST      0 - 45 U/L 10   Complement C3      76 - 169 mg/dL 157   Complement C4      15 - 50 mg/dL 32   CRP Inflammation      0.0 - 8.0 mg/L <2.9   Sed Rate      0 - 20 mm/h 2   DNA-ds      <10 IU/mL 1   Cyclic Citrullinated Peptide Antibody, IgG      <7 U/mL 1   Rheumatoid Factor      <20 IU/mL <20   Proteinase 3 Antibody IgG      0.0 - 0.9 AI <0.2 . . .   Myeloperoxidase Antibody IgG      0.0 - 0.9 AI 2.5 (H)   Vitamin D Deficiency screening      20 - 75 ug/L 24   Hemoglobin  A1C      4.3 - 6.0 % 7.9 (H)   ALLI by IFA IgG       1:40 (A) . . .     Component      Latest Ref Rng & Units 1/16/2021   WBC      4.0 - 11.0 10e9/L    RBC Count      3.8 - 5.2 10e12/L    Hemoglobin      11.7 - 15.7 g/dL    Hematocrit      35.0 - 47.0 %    MCV      78 - 100 fl    MCH      26.5 - 33.0 pg    MCHC      31.5 - 36.5 g/dL    RDW      10.0 - 15.0 %    Platelet Count      150 - 450 10e9/L    Diff Method          % Neutrophils      %    % Lymphocytes      %    % Monocytes      %    % Eosinophils      %    % Basophils      %    % Immature Granulocytes      %    Nucleated RBCs      0 /100    Absolute Neutrophil      1.6 - 8.3 10e9/L    Absolute Lymphocytes      0.8 - 5.3 10e9/L    Absolute Monocytes      0.0 - 1.3 10e9/L    Absolute Eosinophils      0.0 - 0.7 10e9/L    Absolute Basophils      0.0 - 0.2 10e9/L    Abs Immature Granulocytes      0 - 0.4 10e9/L    Absolute Nucleated RBC          IGG      610 - 1,616 mg/dL    IGA      84 - 499 mg/dL    IGM      35 - 242 mg/dL    Creatinine      0.52 - 1.04 mg/dL    GFR Estimate      >60 mL/min/1.73:m2    GFR Estimate If Black      >60 mL/min/1.73:m2    COVID-19 Virus PCR to [facility] - Source       Nasopharyngeal   COVID-19 Virus PCR to [facility] - Result       Not Detected   Neutrophil Cytoplasmic Antibody      <1:10 titer    Neutrophil Cytoplasmic Antibody Pattern          CD19 B Cells      6 - 27 %    Absolute CD19      107 - 698 cells/uL    Myeloperoxidase Antibody IgG      0.0 - 0.9 AI    Proteinase 3 Antibody IgG      0.0 - 0.9 AI    Vitamin D Deficiency screening      20 - 75 ug/L    Sed Rate      0 - 30 mm/h    CRP Inflammation      0.0 - 8.0 mg/L    Albumin      3.4 - 5.0 g/dL    ALT      0 - 50 U/L    AST      0 - 45 U/L      Component      Latest Ref Rng & Units 5/7/2021   WBC      4.0 - 11.0 10e9/L 8.9   RBC Count      3.8 - 5.2 10e12/L 4.89   Hemoglobin      11.7 - 15.7 g/dL 12.7   Hematocrit      35.0 - 47.0 % 39.7   MCV      78 - 100 fl 81   MCH      26.5  - 33.0 pg 26.0 (L)   MCHC      31.5 - 36.5 g/dL 32.0   RDW      10.0 - 15.0 % 13.7   Platelet Count      150 - 450 10e9/L 336   Diff Method       Automated Method   % Neutrophils      % 65.3   % Lymphocytes      % 20.7   % Monocytes      % 7.8   % Eosinophils      % 5.3   % Basophils      % 0.7   % Immature Granulocytes      % 0.2   Nucleated RBCs      0 /100 0   Absolute Neutrophil      1.6 - 8.3 10e9/L 5.8   Absolute Lymphocytes      0.8 - 5.3 10e9/L 1.8   Absolute Monocytes      0.0 - 1.3 10e9/L 0.7   Absolute Eosinophils      0.0 - 0.7 10e9/L 0.5   Absolute Basophils      0.0 - 0.2 10e9/L 0.1   Abs Immature Granulocytes      0 - 0.4 10e9/L 0.0   Absolute Nucleated RBC       0.0   IGG      610 - 1,616 mg/dL 649   IGA      84 - 499 mg/dL 199   IGM      35 - 242 mg/dL 36   Creatinine      0.52 - 1.04 mg/dL 0.96   GFR Estimate      >60 mL/min/1.73:m2 66   GFR Estimate If Black      >60 mL/min/1.73:m2 77   COVID-19 Virus PCR to [facility] - Source          COVID-19 Virus PCR to [facility] - Result          Neutrophil Cytoplasmic Antibody      <1:10 titer <1:10   Neutrophil Cytoplasmic Antibody Pattern       The ANCA IFA is <1:10.  No further testing will be performed.   CD19 B Cells      6 - 27 % <1 (L)   Absolute CD19      107 - 698 cells/uL <1 (L)   Myeloperoxidase Antibody IgG      0.0 - 0.9 AI 1.1 (H)   Proteinase 3 Antibody IgG      0.0 - 0.9 AI <0.2   Vitamin D Deficiency screening      20 - 75 ug/L 41   Sed Rate      0 - 30 mm/h 9   CRP Inflammation      0.0 - 8.0 mg/L <2.9   Albumin      3.4 - 5.0 g/dL 4.1   ALT      0 - 50 U/L 28   AST      0 - 45 U/L 18     Lab on 07/08/2022   Component Date Value Ref Range Status    Sodium 07/08/2022 143  133 - 144 mmol/L Final    Potassium 07/08/2022 3.9  3.4 - 5.3 mmol/L Final    Chloride 07/08/2022 108  94 - 109 mmol/L Final    Carbon Dioxide (CO2) 07/08/2022 25  20 - 32 mmol/L Final    Anion Gap 07/08/2022 10  3 - 14 mmol/L Final    Urea Nitrogen 07/08/2022 17  7 - 30 mg/dL  Final    Creatinine 07/08/2022 1.01  0.52 - 1.04 mg/dL Final    Calcium 07/08/2022 9.3  8.5 - 10.1 mg/dL Final    Glucose 07/08/2022 103 (A) 70 - 99 mg/dL Final    GFR Estimate 07/08/2022 65  >60 mL/min/1.73m2 Final    Effective December 21, 2021 eGFRcr in adults is calculated using the 2021 CKD-EPI creatinine equation which includes age and gender (Ryan et al., [facility], DOI: 10.[facility]6/[id number])    WBC Count 07/08/2022 12.0 (A) 4.0 - 11.0 10e3/uL Final    RBC Count 07/08/2022 4.94  3.80 - 5.20 10e6/uL Final    Hemoglobin 07/08/2022 12.6  11.7 - 15.7 g/dL Final    Hematocrit 07/08/2022 39.6  35.0 - 47.0 % Final    MCV 07/08/2022 80  78 - 100 fL Final    MCH 07/08/2022 25.5 (A) 26.5 - 33.0 pg Final    MCHC 07/08/2022 31.8  31.5 - 36.5 g/dL Final    RDW 07/08/2022 13.6  10.0 - 15.0 % Final    Platelet Count 07/08/2022 350  150 - 450 10e3/uL Final    % Neutrophils 07/08/2022 66  % Final    % Lymphocytes 07/08/2022 22  % Final    % Monocytes 07/08/2022 9  % Final    % Eosinophils 07/08/2022 3  % Final    % Basophils 07/08/2022 0  % Final    % Immature Granulocytes 07/08/2022 0  % Final    NRBCs per 100 WBC 07/08/2022 0  <1 /100 Final    Absolute Neutrophils 07/08/2022 7.9  1.6 - 8.3 10e3/uL Final    Absolute Lymphocytes 07/08/2022 2.7  0.8 - 5.3 10e3/uL Final    Absolute Monocytes 07/08/2022 1.1  0.0 - 1.3 10e3/uL Final    Absolute Eosinophils 07/08/2022 0.3  0.0 - 0.7 10e3/uL Final    Absolute Basophils 07/08/2022 0.1  0.0 - 0.2 10e3/uL Final    Absolute Immature Granulocytes 07/08/2022 0.0  <=0.4 10e3/uL Final    Absolute NRBCs 07/08/2022 0.0  10e3/uL Final     Component      Latest Ref Rng & Units 8/19/2022   WBC      4.0 - 11.0 10e3/uL 12.6 (H)   RBC Count      3.80 - 5.20 10e6/uL 5.06   Hemoglobin      11.7 - 15.7 g/dL 12.8   Hematocrit      35.0 - 47.0 % 40.4   MCV      78 - 100 fL 80   MCH      26.5 - 33.0 pg 25.3 (L)   MCHC      31.5 - 36.5 g/dL 31.7   RDW      10.0 - 15.0 % 14.1   Platelet Count      150 - 450  10e3/uL 387   % Neutrophils      % 71   % Lymphocytes      % 20   % Monocytes      % 6   % Eosinophils      % 3   % Basophils      % 0   % Immature Granulocytes      % 0   NRBCs per 100 WBC      <1 /100 0   Absolute Neutrophils      1.6 - 8.3 10e3/uL 8.8 (H)   Absolute Lymphocytes      0.8 - 5.3 10e3/uL 2.5   Absolute Monocytes      0.0 - 1.3 10e3/uL 0.8   Absolute Eosinophils      0.0 - 0.7 10e3/uL 0.4   Absolute Basophils      0.0 - 0.2 10e3/uL 0.1   Absolute Immature Granulocytes      <=0.4 10e3/uL 0.1   Absolute NRBCs      10e3/uL 0.0   Color Urine      Colorless, Straw, Light Yellow, Yellow Straw   Appearance Urine      Clear Clear   Glucose Urine      Negative mg/dL 1000 (A)   Bilirubin Urine      Negative Negative   Ketones Urine      Negative mg/dL Negative   Specific Gravity Urine      1.003 - 1.035 1.020   Blood Urine      Negative Negative   pH Urine      5.0 - 7.0 5.0   Protein Albumin Urine      Negative mg/dL Negative   Urobilinogen mg/dL      Normal, 2.0 mg/dL Normal   Nitrite Urine      Negative Negative   Leukocyte Esterase Urine      Negative Negative   RBC Urine      <=2 /HPF 1   WBC Urine      <=5 /HPF 5   Squamous Epithelial /HPF Urine      <=1 /HPF <1   MPO Marline IgG Instrument Value      <3.5 U/mL 0.7   Myeloperoxidase Antibody IgG      Negative Negative   Proteinase 3 Marline IgG Instrument Value      <2.0 U/mL <1.0   Proteinase 3 Antibody IgG      Negative Negative   Protein Random Urine      g/L 0.11   Protein Total Urine g/gr Creatinine      0.00 - 0.20 g/g Cr 0.09   Creatinine Urine      mg/dL 128   IGG      610 - 1,616 mg/dL 641   IGA      84 - 499 mg/dL 204   IGM      35 - 242 mg/dL 32 (L)   Creatinine      0.52 - 1.04 mg/dL 1.24 (H)   GFR Estimate      >60 mL/min/1.73m2 51 (L)   CD19 B Cells      6 - 27 % <1 (L)   Absolute CD19      107 - 698 cells/uL <1 (L)   Neutrophil Cytoplasmic Antibody      <1:10 <1:10   Neutrophil Cytoplasmic Antibody Pattern       The ANCA IFA is <1:10.  No further  testing will be performed.   AST      0 - 45 U/L 16   ALT      0 - 50 U/L 34   Albumin      3.4 - 5.0 g/dL 4.2   CRP Inflammation      0.0 - 8.0 mg/L 9.1 (H)   Sed Rate      0 - 30 mm/hr 15   Vitamin D Deficiency screening      20 - 75 ug/L 36     Component      Latest Ref Rng & Units 1/19/2023   WBC      4.0 - 11.0 10e3/uL 16.1 (H)   RBC Count      3.80 - 5.20 10e6/uL 5.39 (H)   Hemoglobin      11.7 - 15.7 g/dL 13.4   Hematocrit      35.0 - 47.0 % 41.9   MCV      78 - 100 fL 78   MCH      26.5 - 33.0 pg 24.9 (L)   MCHC      31.5 - 36.5 g/dL 32.0   RDW      10.0 - 15.0 % 15.4 (H)   Platelet Count      150 - 450 10e3/uL 383   % Neutrophils      % 79   % Lymphocytes      % 16   % Monocytes      % 4   % Eosinophils      % 1   % Basophils      % 0   % Immature Granulocytes      % 0   NRBCs per 100 WBC      <1 /100 0   Absolute Neutrophils      1.6 - 8.3 10e3/uL 12.6 (H)   Absolute Lymphocytes      0.8 - 5.3 10e3/uL 2.6   Absolute Monocytes      0.0 - 1.3 10e3/uL 0.7   Absolute Eosinophils      0.0 - 0.7 10e3/uL 0.2   Absolute Basophils      0.0 - 0.2 10e3/uL 0.1   Absolute Immature Granulocytes      <=0.4 10e3/uL 0.1   Absolute NRBCs      10e3/uL 0.0   Sodium      136 - 145 mmol/L 137   Potassium      3.4 - 5.3 mmol/L 4.0   Chloride      98 - 107 mmol/L 98   Carbon Dioxide (CO2)      22 - 29 mmol/L 25   Anion Gap      7 - 15 mmol/L 14   Urea Nitrogen      6.0 - 20.0 mg/dL 23.6 (H)   Creatinine      0.51 - 0.95 mg/dL 1.09 (H)   Calcium      8.6 - 10.0 mg/dL 10.3 (H)   Glucose      70 - 99 mg/dL 232 (H)   Alkaline Phosphatase      35 - 104 U/L 95   AST      10 - 35 U/L 23   ALT      10 - 35 U/L 26   Protein Total      6.4 - 8.3 g/dL 7.7   Albumin      3.5 - 5.2 g/dL 4.9   Bilirubin Total      <=1.2 mg/dL 0.3   GFR Estimate      >60 mL/min/1.73m2 59 (L)   Color Urine      Colorless, Straw, Light Yellow, Yellow Light Yellow   Appearance Urine      Clear Clear   Glucose Urine      Negative mg/dL >=1000 (A)   Bilirubin  Urine      Negative Negative   Ketones Urine      Negative mg/dL 10 (A)   Specific Gravity Urine      1.003 - 1.035 1.033   Blood Urine      Negative Negative   pH Urine      5.0 - 7.0 6.0   Protein Albumin Urine      Negative mg/dL Negative   Urobilinogen mg/dL      Normal, 2.0 mg/dL Normal   Nitrite Urine      Negative Negative   Leukocyte Esterase Urine      Negative Negative   Iron      37 - 145 ug/dL 51   Iron Binding Capacity      240 - 430 ug/dL 362   Iron Sat Index      15 - 46 % 14 (L)   Parathyroid Hormone Intact      15 - 65 pg/mL 51   Calcium Ionized Whole Blood      4.4 - 5.2 mg/dL 4.9   Ferritin      11 - 328 ng/mL 70   TSH      0.30 - 4.20 uIU/mL 0.40   3 views cervical spine radiographs 2/10/2023 4:10 PM     History: neck pain; Neck pain     Comparison: MRI cervical spine 4/14/2015.     Findings:  AP, lateral, and odontoid views of the cervical spine were obtained.     Down to the level of C7 is well visualized on the lateral view(s). The  cervicothoracic junction is partially visualized.     There is no acute osseous abnormality. No prevertebral soft tissue  swelling. Normal cervical lordosis is maintained.       Moderate loss of intervertebral disc height at C6-7. Mild loss of disc  height at C5-6. Additional multilevel degenerative changes including  endplate osteophytosis and uncinate hypertrophy. Dens is obscured by  the overlying maxilla on the odontoid view.     The visualized lung apices are clear.                                                                      Impression:  Multilevel cervical degenerative changes, greatest at C6-7.     I have personally reviewed the examination and initial interpretation  and I agree with the findings.     TASHI KELLY MD      Component      Latest Ref Rng & Units 2/10/2023   WBC      4.0 - 11.0 10e3/uL 11.9 (H)   RBC Count      3.80 - 5.20 10e6/uL 5.30 (H)   Hemoglobin      11.7 - 15.7 g/dL 13.2   Hematocrit      35.0 - 47.0 % 40.7   MCV      78 -  100 fL 77 (L)   MCH      26.5 - 33.0 pg 24.9 (L)   MCHC      31.5 - 36.5 g/dL 32.4   RDW      10.0 - 15.0 % 15.3 (H)   Platelet Count      150 - 450 10e3/uL 415   % Neutrophils      % 69   % Lymphocytes      % 23   % Monocytes      % 6   % Eosinophils      % 2   % Basophils      % 0   % Immature Granulocytes      % 0   NRBCs per 100 WBC      <1 /100 0   Absolute Neutrophils      1.6 - 8.3 10e3/uL 8.1   Absolute Lymphocytes      0.8 - 5.3 10e3/uL 2.7   Absolute Monocytes      0.0 - 1.3 10e3/uL 0.8   Absolute Eosinophils      0.0 - 0.7 10e3/uL 0.2   Absolute Basophils      0.0 - 0.2 10e3/uL 0.0   Absolute Immature Granulocytes      <=0.4 10e3/uL 0.0   Absolute NRBCs      10e3/uL 0.0   Sodium      136 - 145 mmol/L 138   Potassium      3.4 - 5.3 mmol/L 4.1   Chloride      98 - 107 mmol/L 99   Carbon Dioxide (CO2)      22 - 29 mmol/L 23   Anion Gap      7 - 15 mmol/L 16 (H)   Urea Nitrogen      6.0 - 20.0 mg/dL 24.6 (H)   Creatinine      0.51 - 0.95 mg/dL 1.07 (H)   Calcium      8.6 - 10.0 mg/dL 10.5 (H)   Glucose      70 - 99 mg/dL 205 (H)   Alkaline Phosphatase      35 - 104 U/L 86   AST      10 - 35 U/L 26   ALT      10 - 35 U/L 30   Protein Total      6.4 - 8.3 g/dL 7.6   Albumin      3.5 - 5.2 g/dL 4.8   Bilirubin Total      <=1.2 mg/dL 0.2   GFR Estimate      >60 mL/min/1.73m2 61   MPO Marline IgG Instrument Value      <3.5 U/mL 0.8   Myeloperoxidase Antibody IgG      Negative Negative   Proteinase 3 Marline IgG Instrument Value      <2.0 U/mL <1.0   Proteinase 3 Antibody IgG      Negative Negative   IGG      610 - 1,616 mg/dL 680   IGA      84 - 499 mg/dL 197   IGM      35 - 242 mg/dL 30 (L)   CD19 B Cells      6 - 27 % <1 (L)   Absolute CD19      107 - 698 cells/uL <1 (L)   Neutrophil Cytoplasmic Antibody      <1:10 <1:10   Neutrophil Cytoplasmic Antibody Pattern       The ANCA IFA is <1:10.  No further testing will be performed.   % Retic      0.5 - 2.0 % 1.4   Absolute Retic      0.025 - 0.095 10e6/uL 0.073   CRP  Inflammation      <5.00 mg/L 6.67 (H)   Sed Rate      0 - 30 mm/hr 12   Lipase      13 - 60 U/L 26     ROS: A comprehensive ROS was done. Positives are per HPI.      HISTORY REVIEW:  Past Medical History:   Diagnosis Date    Abnormal glandular Papanicolaou smear of cervix 1992    Allergic rhinitis     Allergy, airborne subst    Arthritis     ASCVD (arteriosclerotic cardiovascular disease)     Chronic pain     Chronic pancreatitis (H)     idiopathic, Tx: PPI, antioxidants, pancreatic enzymes    Common migraine     Coronary artery disease     Costochondritis     Difficulty in walking(719.7)     Dyspnea on exertion     Ectasia, mammary duct     [facility], persistent nipple discharge    Elevated fasting glucose     Gastro-oesophageal reflux disease     Granulomatosis with polyangiitis (H)     Hernia     History of angina     Hyperlipidemia LDL goal < 100     Hypertension goal BP (blood pressure) < 140/90     Essential hypertension    Iron deficiency anemia     Ischemic cardiomyopathy     Menorrhagia     Migraine headaches     Mild persistent asthma     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 Matias, neg w/u, Rx'd w/prednisone    Ventral hernia, unspecified, without mention of obstruction or gangrene 07/2012     Past Surgical History:   Procedure Laterality Date     C/SECTION, LOW TRANSVERSE  1996        CARDIAC SURGERY      cardiac stent- recent in 16; 4 other stents    COLONOSCOPY N/A 2023    Procedure: COLONOSCOPY, WITH POLYPECTOMY;  Surgeon: Percy Gross MD;  Location: [facility] OR    DILATION AND CURETTAGE N/A 2016    Procedure: DILATION AND CURETTAGE;  Surgeon: Nahed Butler MD;  [facility]    ENDOMETRIAL SAMPLING (BIOPSY) N/A 2023    Procedure: ENDOMETRIAL BIOPSY;  Surgeon: Nahed Butler MD;  [facility]    ESOPHAGOSCOPY, GASTROSCOPY, DUODENOSCOPY (EGD), COMBINED N/A 2022    Procedure: ESOPHAGOGASTRODUODENOSCOPY, WITH BIOPSY;  Surgeon: Enzo Sesay MD;  [facility]    ESOPHAGOSCOPY, GASTROSCOPY, DUODENOSCOPY (EGD), COMBINED N/A 2023    Procedure: ESOPHAGOGASTRODUODENOSCOPY, WITH BIOPSY;  Surgeon: Percy Gross MD;  Location: [facility] OR    EXAM UNDER ANESTHESIA PELVIC N/A 2023    Procedure: EXAM UNDER ANESTHESIA;  Surgeon: Nahed Butler MD;  [facility]    HC UGI ENDOSCOPY W EUS  2008    Dr. Pastrana, possible chronic pancreatitis, fatty liver    HERNIA REPAIR  2012    done at [facility]    INSERT INTRAUTERINE DEVICE N/A 2016    Procedure: INSERT INTRAUTERINE DEVICE;  Surgeon: Nahed Butler MD;  [facility]    INT UTERINE FIBRIOD EMBOLIZATION  10/29/2014    LAPAROSCOPIC CHOLECYSTECTOMY  2008    Dr. Arnol GRUBBS TX, CERVICAL  1992    s/p LEERAHUL, done at [facility] in [place].    ORBITOTOMY Right 03/15/2016    Procedure: ORBITOTOMY;  Surgeon: Myron Cyr MD;  [facility]    ORBITOTOMY Right 2017    Procedure: ORBITOTOMY;  RIGHT ORBITOTOMY AND BIOPSY;  Surgeon: Charis Holbrook MD;  Location: [facility]    REMOVE INTRAUTERINE DEVICE N/A 2023    Procedure: Remove intrauterine device,;  Surgeon: Nahed Butler MD;  [facility]    REPAIR PTOSIS Right 2017    Procedure: REPAIR PTOSIS;  RIGHT  UPPER LID PTOSIS REPAIR;  Surgeon: Myron Cyr MD;  Location: [facility]    UPPER GI ENDOSCOPY  10/21/2008    mild gastritis, Dr. Rocky [facility]DERA ECHO HEART XTHORACIC,COMPLETE, W/O DOPPLER  2004    [facility] WNL, EF 60%     Family History   Problem Relation Age of Onset    Hypertension Mother     Arthritis Mother     Heart Disease Mother         long qt syndrome    Thyroid Disease Mother     C.A.D. Mother     Heart Disease Father 50        heart attack    Cerebrovascular Disease Father     Diabetes Father     Hypertension Father     Depression Father     C.A.D. Father     Neurologic Disorder Sister         migraines    Neurologic Disorder Sister         migraines    Heart Disease Brother 15        MI at 15, 16.     Arthritis Brother         he passed away, had severe arthritis at age 11    C.A.D. Brother     Anesthesia Reaction Son         PONV    Respiratory Son         asthma    Hypertension Maternal Aunt     Diabetes Maternal Uncle     Hypertension Maternal Uncle     Arthritis Maternal Uncle     Eye Disorder Maternal Uncle         cataracts    Cerebrovascular Disease Maternal Uncle     Family History Negative Other         neg for RA, SLE    Unknown/Adopted No family hx of         unknown neurological issues in her family, mother was adopted    Skin Cancer No family hx of         No known family hx of skin cancer    Deep Vein Thrombosis (DVT) No family hx of      Social History     Socioeconomic History    Marital status: Single     Spouse name: Not on file    Number of children: 1    Years of education: Not on file    Highest education level: Not on file   Occupational History     Employer: NONE    Tobacco Use    Smoking status: Some Days     Current packs/day: 0.00     Average packs/day: 0.2 packs/day for 1 year (0.2 ttl pk-yrs)     Types: Cigarettes     Start date: 2015     Last attempt to quit: 2016     Years since quittin.3    Smokeless tobacco: Never   Vaping Use     Vaping status: Every Day    Substances: Nicotine   Substance and Sexual Activity    Alcohol use: No     Alcohol/week: 0.0 standard drinks of alcohol    Drug use: No    Sexual activity: Not Currently   Other Topics Concern    Parent/sibling w/ CABG, MI or angioplasty before 65F 55M? Yes   Social History Narrative    Balanced Diet - sometimes    Osteoporosis Prevention Measures - Dairy servings per day: 2 servings weekly    Regular Exercise -  Yes Describe walking 4 times a week    Dental Exam - NO    Seatbelts used - Yes    Self Breast Exam - Yes    Abuse: Current or Past (Physical, Sexual or Emotional)- No    Do you have any concerns about STD's -  No    Do you feel safe in your environment - No    Guns stored in the home - No    Sunscreen used - Yes    Lipids -  YES - Date: 053102    Glucose -  YES - Date: 012804    Eye Exam - YES - Date: one year ago    Colon Cancer Screening - No    Hemoccults - NO    Pap Test -  YES - Date: 070904, remote history of LEEP    Mammography - YES - Date: last spring, would like to discuss, needs [facility] referral [facility]    DEXA - NO    Immunizations reviewed and up to date - Yes, last td given in 1997 or 1998     Social Determinants of Health     Financial Resource Strain: Low Risk  (2/9/2024)    Financial Resource Strain     Within the past 12 months, have you or your family members you live with been unable to get utilities (heat, electricity) when it was really needed?: No   Food Insecurity: High Risk (2/9/2024)    Food Insecurity     Within the past 12 months, did you worry that your food would run out before you got money to buy more?: Yes     Within the past 12 months, did the food you bought just not last and you didn t have money to get more?: No   Transportation Needs: Low Risk  (2/9/2024)    Transportation Needs     Within the past 12 months, has lack of transportation kept you from medical appointments, getting your medicines, non-medical meetings or  appointments, work, or from getting things that you need?: No   Physical Activity: Not on file   Stress: Not on file   Social Connections: Not on file   Interpersonal Safety: Low Risk  (2/9/2024)    Interpersonal Safety     Do you feel physically and emotionally safe where you currently live?: Yes     Within the past 12 months, have you been hit, slapped, kicked or otherwise physically hurt by someone?: No     Within the past 12 months, have you been humiliated or emotionally abused in other ways by your partner or ex-partner?: No   Housing Stability: Low Risk  (2/9/2024)    Housing Stability     Do you have housing? : Yes     Are you worried about losing your housing?: No     Patient Active Problem List   Diagnosis    Seasonal affective disorder (H24)    Allergic rhinitis    PCOS (polycystic ovarian syndrome)    Moderate persistent asthma    Chronic pancreatitis (H)    Hypertension goal BP (blood pressure) < 140/90    Common migraine    NSTEMI (non-ST elevated myocardial infarction) (H)    Hyperlipidemia LDL goal <70    ASCVD (arteriosclerotic cardiovascular disease)    GERD (gastroesophageal reflux disease)    Ischemic cardiomyopathy    Hypertensive heart disease    Uterine leiomyoma    Iron deficiency anemia    Costochondritis    Vitamin D deficiency    Breast cancer screening    Spinal stenosis in cervical region    Fibromyalgia    Seronegative rheumatoid arthritis (H)    Type 2 diabetes, HbA1C goal < 8% (H)    Type 2 diabetes mellitus with other specified complication (H)    Hyperlipidemia associated with type 2 diabetes mellitus (H)    Hypertension associated with diabetes (H)    Overweight with body mass index (BMI) 25.0-29.9    Tobacco use disorder    Telogen effluvium    CAD S/P percutaneous coronary angioplasty    Status post coronary angiogram    ANCA-associated vasculitis (H)    Wegener's vasculitis    Type 1 diabetes mellitus with complications (H)    Chest discomfort    Urinary frequency    Abdominal  pain, epigastric    Hypokalemia    Leukocytosis, unspecified type    Acute pancreatitis, unspecified complication status, unspecified pancreatitis type       Pregnancy Hx: She is . All miscarriages were in first trimester. H/o OC use in the past. 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    PMHx, FHx, SHx were reviewed, unchanged.    Outpatient Encounter Medications as of 2024   Medication Sig Dispense Refill    acetaminophen (TYLENOL) 325 MG tablet Take 1-2 tablets (325-650 mg) by mouth every 6 hours as needed for pain (headache) 250 tablet 4    ADVAIR DISKUS 250-50 MCG/ACT inhaler Inhale 1 puff into the lungs 2 times daily      albuterol (2.5 MG/3ML) 0.083% neb solution INL 1 VIAL VIA NEBULIZATION Q 4 TO 6 HOURS PRN  1    albuterol (PROAIR HFA, PROVENTIL HFA, VENTOLIN HFA) 108 (90 BASE) MCG/ACT inhaler Inhale 2 puffs into the lungs every 6 hours as needed for shortness of breath / dyspnea or wheezing 3 Inhaler 1    BANOPHEN 25 MG tablet Take 25 mg by mouth At Bedtime      blood glucose (NO BRAND SPECIFIED) lancets standard Use to test blood sugar 1-4 times daily or as directed. 400 each 3    blood glucose (NO BRAND SPECIFIED) test strip Use to test blood sugar fasting each am and predinner daily. 250 strip 3    blood glucose monitoring (NO BRAND SPECIFIED) meter device kit Use to test blood sugar 2 times daily or as directed. 1 kit 0    Blood Pressure Monitor KIT 1 each daily Monitor home blood pressure as instructed by physician.  Dispense [place] blood pressure kit. 1 kit 0    calcium carbonate (TUMS) 500 MG chewable tablet Take 3-4 tablets (1,500-2,000 mg) by mouth daily as needed 90 tablet 3    clopidogrel (PLAVIX) 75 MG tablet Take 1 tablet (75 mg) by mouth daily . 30 tablet 6    clotrimazole (MYCELEX) 10 MG lozenge Place 1 lozenge (10 mg) inside cheek 5 times daily 50 lozenge 1    cyclobenzaprine (FLEXERIL) 10 MG tablet Take 1 tablet (10 mg) by mouth 2 times daily as needed for muscle  spasms 60 tablet 3    cycloSPORINE (RESTASIS) 0.05 % ophthalmic emulsion Place 1 drop into both eyes 2 times daily 5.5 mL 11    cycloSPORINE (RESTASIS) 0.05 % ophthalmic emulsion 1 month supply 11 refills 1 each 11    dicyclomine (BENTYL) 20 MG tablet TAKE 1 TABLET(20 MG) BY MOUTH FOUR TIMES DAILY AS NEEDED. 60 tablet 4    empagliflozin (JARDIANCE) 25 MG TABS tablet Take 1 tablet (25 mg) by mouth daily 90 tablet 2    EPIPEN 2-RIKY 0.3 MG/0.3ML injection INJECT 0.3 MG INTO THE MUSCLE PRF ANAPHYALAXIS  0    esomeprazole (NEXIUM) 40 MG DR capsule Take 1 capsule (40 mg) by mouth 2 times daily Take 30-60 minutes before eating. 180 capsule 0    estradiol (VAGIFEM) 10 MCG TABS vaginal tablet Place 1 tablet (10 mcg) vaginally twice a week 24 tablet 3    fluticasone (FLONASE) 50 MCG/ACT nasal spray Spray 1 spray in nostril as needed      folic acid (FOLVITE) 1 MG tablet Take 1 tablet (1 mg) by mouth daily 90 tablet 0    hydrocortisone (CORTAID) 1 % external cream Apply topically 2 times daily (Patient taking differently: Apply topically as needed) 60 g 1    insulin glargine (LANTUS PEN) 100 UNIT/ML pen Inject 56 Units Subcutaneous every morning Or as directed. 75 mL 1    insulin pen needle (BD PEN NEEDLE MARCK 2ND GEN) 32G X 4 MM miscellaneous Use one pen needle daily or as directed. 100 each 3    ketoconazole (NIZORAL) 2 % shampoo Apply topically daily as needed      levocetirizine (XYZAL) 5 MG tablet Take 5 mg by mouth as needed      lisinopril-hydrochlorothiazide (ZESTORETIC) 20-25 MG tablet Take 1 tablet by mouth daily 30 tablet 6    LORazepam (ATIVAN) 0.5 MG tablet Take 1 tablet 30-60 minutes before your scheduled MRI 1 tablet 0    magnesium 250 MG tablet TAKE 1 TABLET(250 MG) BY MOUTH TWICE DAILY 60 tablet 3    metFORMIN (GLUCOPHAGE XR) 500 MG 24 hr tablet Take 4 tablets (2,000 mg) by mouth daily (with dinner) 360 tablet 3    metoprolol succinate ER (TOPROL XL) 100 MG 24 hr tablet Take 1 tablet (100 mg) by mouth daily 30  tablet 6    Multiple Vitamin (TAB-A-GERALD) TABS Take 1 tablet by mouth daily 90 tablet 1    nitroGLYcerin (NITROSTAT) 0.4 MG sublingual tablet For chest pain place 1 tablet under the tongue every 5 minutes for 3 doses. If symptoms persist 5 minutes after 1st dose call 911. 30 tablet 11    nitroGLYcerin (NITROSTAT) 0.4 MG sublingual tablet Place 1 tablet (0.4 mg) under the tongue every 5 minutes as needed for chest pain . After 3 doses, if pain persists call 911. 25 tablet 3    olopatadine (PATANOL) 0.1 % ophthalmic solution Place 1 drop into both eyes as needed      ondansetron (ZOFRAN ODT) 8 MG ODT tab TAKE 1 TABLET BY MOUTH EVERY 8 HOURS AS NEEDED FOR NAUSEA 20 tablet 1    pravastatin (PRAVACHOL) 40 MG tablet Take 1 tablet (40 mg) by mouth daily 30 tablet 6    pregabalin (LYRICA) 25 MG capsule TAKE 1 CAPSULE BY MOUTH EVERY DAY 30 capsule 1    prochlorperazine (COMPAZINE) 5 MG tablet Take 1 tablet (5 mg) by mouth every 6 hours as needed for nausea or vomiting 60 tablet 3    sennosides (SENOKOT) 8.6 MG tablet 1-2 tabs a day as needed for constipation 60 tablet 1    spironolactone (ALDACTONE) 25 MG tablet Take 1 tablet (25 mg) by mouth daily. May also take 0.5 tablets (12.5 mg) daily as needed (for weight gain). 45 tablet 6    sucralfate (CARAFATE) 1 GM tablet Take 1 tablet (1 g) by mouth 4 times daily 120 tablet 3    terbinafine (LAMISIL) 1 % external cream Apply topically 2 times daily (Patient taking differently: Apply topically as needed) 42 g 1    tiZANidine (ZANAFLEX) 4 MG tablet Take 1 tablet (4 mg) by mouth 3 times daily as needed for muscle spasms 21 tablet 3    traMADol (ULTRAM) 50 MG tablet TAKE 1 TABLET(50 MG) BY MOUTH EVERY 8 HOURS AS NEEDED FOR SEVERE PAIN 60 tablet 3    vitamin D3 (CHOLECALCIFEROL) 50 mcg (2000 units) tablet Take 1 tablet (50 mcg) by mouth daily 90 tablet 1    vitamin D3 (CHOLECALCIFEROL) 50 mcg (2000 units) tablet Take 1 tablet (50 mcg) by mouth daily 90 tablet 0     No  facility-administered encounter medications on file as of 6/5/2024.       Allergies   Allergen Reactions    Amoxicillin-Pot Clavulanate      Unknown possible hives and edema    Amoxicillin-Pot Clavulanate     Artificial Sweetner (Informational Only)  Other (See Comments)     Increased headache    Azithromycin     Clavulanic Acid     Diatrizoate Other (See Comments)     Pt wants this listed because she is allergic to shellfish     Imitrex [Triptans]      Severe face/neck/chest tightness and flushing side effects     Penicillins Hives     Unknown     Pork Allergy      Stomach pain, cramping, diarrhea, itching, nausea and headaches    Shellfish Allergy Hives and Swelling    Shellfish-Derived Products     Sulfa Antibiotics Hives and Swelling    Sulfatolamide     Zithromax [Azithromycin Dihydrate] Swelling     Unknown        Ph.E:    /81 (BP Location: Right arm, Cuff Size: Adult Regular)   Pulse 73   Wt 73.9 kg (163 lb)   LMP  (LMP Unknown)   BMI 28.87 kg/m      GA: NAD, pleasant  HEENT: nl conj, sclera, EOMI. No campos-orbital edema under R eye  Chest: CTAB  CV: no M/R/G, RRR  Abdomen: soft, NT  MSK: + joint tenderness over B/L 2nd/3rd MCPs, no synovitis  Skin: no rash  Neuro: non focal  Psych: nl affect    Assessment/Plan:    1-Seropositive non-erosive RA (arthritis, AM stiffness, high CRP, RF 27 but re-check neg 3/2015 on HCQ) Dx 5/2013, FMS, new pleuritic CP 3/20-15-5/2015 resolved on steroids. She is on  mg bid since 5/2014. She tolerates it well and it's helping some but not enough to control all her pain. Continued having flare ups of joint pain, could be GPA related. Some pain is due to FMS.My impression is that her arthralgias are a combination of RA/ IA sec GPA, fibromyalgia and chronic pain.     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 Prednisone was not a good option for her given weight gain, diabetes. She tried and did not tolerate MTX, AZA.    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 Repeat MPO was positive in 6/2017.    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 Shah and Dr. Valdez at [place]. 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     She is on rituximab since 12/12/2018 with great response, minor allergic reaction which could be managed by pre-meds and extra steroid dose. Developed high BG and vaginal yeast infections after solumedrol premed. Treated candida with fluconazole. Will decrease solumerdol dose to IV 80 mg prior to receiving rituximab. 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 However, feels Rituximab effects wear off around 4 months. According to ACR guidelines can give every 4-6 months. Pt elected to received this upcoming September which will be approximately 5 months from last dose. Repeat Orbit CT in September 2021 demonstrated improvement.     Last visit in April 2022-- Recommended marie given b cell depletion tx as rituximab blocks the response to covid vaccine, she is concerned about side effects. I advised her to discuss with [facility] pharmacist.      1/25/2023: Janine has been ill since Dx of COVID on 12/17/2022. Her most recent labs were reviewed, stable vasculitis labs in 8/2022 with CD19<1, neg vasculitis markers. In 1/2023, no anemia and nl thyroid function test. I ordered vit D as add on. 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 She does need to follow up on abnormal thyroid US and mammogram as well. Our plan for ANCA vasculitis was to give rituximab 1 gram [id number] as benefit did not last 6 months with last rituximab on 9/7/2022, but today we both agreed to give next dose in March after 6  months to let her body heal from COVID. I will see her in person, in early march to determine if it is ok to give another dose of rituxiamb. 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 Also ordered shower chair, commode per her request given significant fatigue, body pain.    3/9/2023: S/p last rituximab 1 gram on 9/7/2022. 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 Stable labs and eye inflammation.    2-Fat pads. She was seen by endocrinology and cushing was ruled out in 4/2014. Was advised to do f/u for enlarged thyroid/thyroid nodules.  3-Hair loss. Continue with dermatology  4-Chronic pain. F/U with pain clinic  5-Vit D deficiency. Was replaced with vit D 50, 000 units po qwk x 12 wk then 2000 units every day  6-?HCQ toxicity on OCT 7/2021, now off HCQ, could explain increased arthralgia  7- Chronic Headaches. Symptoms worsen after taking zofran. Has received compazine in hospital in the past without adverse effect. Will have patient trial Compazine       3/2023 plan:    Spine referral for abnormal C spine (DJD) in 2/2023.    Rituximab 1 gram one dose only this month    Labs in May 2023    Follow up eye exam 8/2023    Return in person in about 5-6 months       9/6/2023: last rituximab 1 gram one dose for ANCA vasculitis on 3/28/2023, increased arthralgia, will try rituximab at full dose, zanaflex and consider resuming HCQ as last eye exam did rule out HCQ toxicity (HCQ was stopped with concern for possible HCQ toxicity).    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 Will schedule rituximab in Feb, pain mx was discussed. Labs are stable.    S/p rituximab 1 gram on 9/19/2023, 10/3/2023.    Plan:      Rituximab 1 gram every 2 weeks x 2 infusion in Feb for ANCA vasculitis    Labs with next rituximab infusion    X-rays today    Acupuncture referral    Return in person in about 4 months    Orders Placed This Encounter   Procedures    CT Orbits wo Contrast    AST    ALT    Myeloperoxidase and Proteinase 3 Panel    Albumin level    Creatinine    CRP inflammation    UA with Microscopic reflex to Culture    Protein  random urine    Creatinine random urine    Erythrocyte sedimentation rate auto    CD19 B Cell Count    ANCA IgG by IFA with Reflex to Titer    Immunoglobulins A G and M    Med Therapy Management Referral    CBC with Platelets & Differential       6/5/2024: more joint pain, rituximab does not last 6 months, will move up appointment. Will check orbit CT. Stable labs. I asked her PCP to help with pain mx.    Labs today    Move up rituximab 1 gram once from Aug to June 2024, our pharmacist will contact you    CT orbit with no contrast    Return in about 3-4 months in person    Majo Mckinnon MD

## 2024-06-06 LAB
ANCA AB PATTERN SER IF-IMP: NORMAL
C-ANCA TITR SER IF: NORMAL {TITER}
IGA SERPL-MCNC: 171 MG/DL (ref 84–499)
IGG SERPL-MCNC: 568 MG/DL (ref 610–1616)
IGM SERPL-MCNC: 25 MG/DL (ref 35–242)

## 2024-06-06 RX ORDER — FOLIC ACID 1 MG/1
1000 TABLET ORAL DAILY
Qty: 90 TABLET | Refills: 0 | Status: SHIPPED | OUTPATIENT
Start: 2024-06-06

## 2024-06-06 RX ORDER — PNV NO.95/FERROUS FUM/FOLIC AC 28MG-0.8MG
TABLET ORAL
Qty: 60 TABLET | Refills: 3 | Status: SHIPPED | OUTPATIENT
Start: 2024-06-06

## 2024-06-07 LAB
MYELOPEROXIDASE AB SER IA-ACNC: 1.4 U/ML
MYELOPEROXIDASE AB SER IA-ACNC: NEGATIVE
PROTEINASE3 AB SER IA-ACNC: <1 U/ML
PROTEINASE3 AB SER IA-ACNC: NEGATIVE

## 2024-06-07 RX ORDER — PEN NEEDLE, DIABETIC 32GX 5/32"
NEEDLE, DISPOSABLE MISCELLANEOUS
Qty: 100 EACH | Refills: 2 | Status: SHIPPED | OUTPATIENT
Start: 2024-06-07

## 2024-06-07 NOTE — TELEPHONE ENCOUNTER
LVD: 3/15/2024  [facility] Kash River MD  Family Medicine     Refilled per protocol.

## 2024-06-10 ENCOUNTER — TELEPHONE (OUTPATIENT)
Dept: FAMILY MEDICINE | Facility: CLINIC | Age: 59
End: 2024-06-10
Payer: COMMERCIAL

## 2024-06-10 NOTE — TELEPHONE ENCOUNTER
Patient calling stating her MRI order needs to state STAT in order for her to be seen sooner than 7/11/2024. Please update order as patient does not want to wait.

## 2024-06-10 NOTE — TELEPHONE ENCOUNTER
I called radiology scheduling and scheduled the MR on 7/5/24 at 8 am (check in time) at the [facility].  She needs to check in [facility] Waiting Room.

## 2024-06-12 ENCOUNTER — HOSPITAL ENCOUNTER (OUTPATIENT)
Dept: CT IMAGING | Facility: CLINIC | Age: 59
Discharge: HOME | End: 2024-06-12
Attending: INTERNAL MEDICINE | Admitting: INTERNAL MEDICINE
Payer: COMMERCIAL

## 2024-06-12 ENCOUNTER — APPOINTMENT (OUTPATIENT)
Dept: GENERAL RADIOLOGY | Facility: CLINIC | Age: 59
End: 2024-06-12
Payer: COMMERCIAL

## 2024-06-12 ENCOUNTER — HOSPITAL ENCOUNTER (EMERGENCY)
Facility: CLINIC | Age: 59
Discharge: HOME | End: 2024-06-13
Attending: INTERNAL MEDICINE | Admitting: INTERNAL MEDICINE
Payer: COMMERCIAL

## 2024-06-12 ENCOUNTER — APPOINTMENT (OUTPATIENT)
Dept: ULTRASOUND IMAGING | Facility: CLINIC | Age: 59
End: 2024-06-12
Payer: COMMERCIAL

## 2024-06-12 DIAGNOSIS — M31.30 GRANULOMATOSIS WITH POLYANGIITIS WITHOUT RENAL INVOLVEMENT (H): ICD-10-CM

## 2024-06-12 DIAGNOSIS — M25.461 EFFUSION OF RIGHT KNEE JOINT: ICD-10-CM

## 2024-06-12 DIAGNOSIS — M25.461 PAIN AND SWELLING OF RIGHT KNEE: ICD-10-CM

## 2024-06-12 DIAGNOSIS — M25.561 PAIN AND SWELLING OF RIGHT KNEE: ICD-10-CM

## 2024-06-12 LAB
ANION GAP SERPL CALCULATED.3IONS-SCNC: 12 MMOL/L (ref 7–15)
BASOPHILS # BLD AUTO: 0 10E3/UL (ref 0–0.2)
BASOPHILS NFR BLD AUTO: 0 %
BUN SERPL-MCNC: 23.9 MG/DL (ref 6–20)
CALCIUM SERPL-MCNC: 10 MG/DL (ref 8.6–10)
CHLORIDE SERPL-SCNC: 99 MMOL/L (ref 98–107)
CREAT SERPL-MCNC: 1.15 MG/DL (ref 0.51–0.95)
CRP SERPL-MCNC: 7.13 MG/L
DEPRECATED HCO3 PLAS-SCNC: 26 MMOL/L (ref 22–29)
EGFRCR SERPLBLD CKD-EPI 2021: 55 ML/MIN/1.73M2
EOSINOPHIL # BLD AUTO: 0.3 10E3/UL (ref 0–0.7)
EOSINOPHIL NFR BLD AUTO: 2 %
ERYTHROCYTE [DISTWIDTH] IN BLOOD BY AUTOMATED COUNT: 14.6 % (ref 10–15)
ERYTHROCYTE [SEDIMENTATION RATE] IN BLOOD BY WESTERGREN METHOD: 11 MM/HR (ref 0–30)
GLUCOSE SERPL-MCNC: 97 MG/DL (ref 70–99)
HCT VFR BLD AUTO: 40.5 % (ref 35–47)
HGB BLD-MCNC: 13.3 G/DL (ref 11.7–15.7)
HIV 1+2 AB+HIV1P24 AG SERPLBLD IA.RAPID: NON REACTIVE
HIV 1+2 AB+HIV1P24 AG SERPLBLD IA.RAPID: NON REACTIVE
HIV INTERPRETATION: NORMAL
IMM GRANULOCYTES # BLD: 0 10E3/UL
IMM GRANULOCYTES NFR BLD: 0 %
LYMPHOCYTES # BLD AUTO: 2.5 10E3/UL (ref 0.8–5.3)
LYMPHOCYTES NFR BLD AUTO: 18 %
MCH RBC QN AUTO: 25.4 PG (ref 26.5–33)
MCHC RBC AUTO-ENTMCNC: 32.8 G/DL (ref 31.5–36.5)
MCV RBC AUTO: 77 FL (ref 78–100)
MONOCYTES # BLD AUTO: 1.1 10E3/UL (ref 0–1.3)
MONOCYTES NFR BLD AUTO: 8 %
NEUTROPHILS # BLD AUTO: 10.5 10E3/UL (ref 1.6–8.3)
NEUTROPHILS NFR BLD AUTO: 72 %
NRBC # BLD AUTO: 0 10E3/UL
NRBC BLD AUTO-RTO: 0 /100
PLATELET # BLD AUTO: 363 10E3/UL (ref 150–450)
POTASSIUM SERPL-SCNC: 3.4 MMOL/L (ref 3.4–5.3)
PROCALCITONIN SERPL IA-MCNC: 0.07 NG/ML
RBC # BLD AUTO: 5.23 10E6/UL (ref 3.8–5.2)
SODIUM SERPL-SCNC: 137 MMOL/L (ref 135–145)
URATE SERPL-MCNC: 6.9 MG/DL (ref 2.4–5.7)
WBC # BLD AUTO: 14.5 10E3/UL (ref 4–11)

## 2024-06-12 PROCEDURE — 250N000009 HC RX 250: Performed by: INTERNAL MEDICINE

## 2024-06-12 PROCEDURE — 250N000013 HC RX MED GY IP 250 OP 250 PS 637

## 2024-06-12 PROCEDURE — 70480 CT ORBIT/EAR/FOSSA W/O DYE: CPT

## 2024-06-12 PROCEDURE — 99284 EMERGENCY DEPT VISIT MOD MDM: CPT | Mod: 25 | Performed by: INTERNAL MEDICINE

## 2024-06-12 PROCEDURE — 82374 ASSAY BLOOD CARBON DIOXIDE: CPT

## 2024-06-12 PROCEDURE — 84550 ASSAY OF BLOOD/URIC ACID: CPT

## 2024-06-12 PROCEDURE — 73562 X-RAY EXAM OF KNEE 3: CPT | Mod: RT

## 2024-06-12 PROCEDURE — 89060 EXAM SYNOVIAL FLUID CRYSTALS: CPT

## 2024-06-12 PROCEDURE — 70480 CT ORBIT/EAR/FOSSA W/O DYE: CPT | Mod: 26 | Performed by: STUDENT IN AN ORGANIZED HEALTH CARE EDUCATION/TRAINING PROGRAM

## 2024-06-12 PROCEDURE — 93971 EXTREMITY STUDY: CPT | Mod: RT

## 2024-06-12 PROCEDURE — 86140 C-REACTIVE PROTEIN: CPT

## 2024-06-12 PROCEDURE — 999N000104 HC STATISTIC NO CHARGE: Performed by: INTERNAL MEDICINE

## 2024-06-12 PROCEDURE — 84157 ASSAY OF PROTEIN OTHER: CPT

## 2024-06-12 PROCEDURE — 85025 COMPLETE CBC W/AUTO DIFF WBC: CPT

## 2024-06-12 PROCEDURE — 20610 DRAIN/INJ JOINT/BURSA W/O US: CPT | Performed by: INTERNAL MEDICINE

## 2024-06-12 PROCEDURE — 87205 SMEAR GRAM STAIN: CPT

## 2024-06-12 PROCEDURE — 82945 GLUCOSE OTHER FLUID: CPT

## 2024-06-12 PROCEDURE — 36415 COLL VENOUS BLD VENIPUNCTURE: CPT

## 2024-06-12 PROCEDURE — 85652 RBC SED RATE AUTOMATED: CPT

## 2024-06-12 PROCEDURE — 89050 BODY FLUID CELL COUNT: CPT

## 2024-06-12 PROCEDURE — 84145 PROCALCITONIN (PCT): CPT

## 2024-06-12 RX ORDER — LIDOCAINE HYDROCHLORIDE AND EPINEPHRINE 10; 10 MG/ML; UG/ML
1 INJECTION, SOLUTION INFILTRATION; PERINEURAL ONCE
Status: COMPLETED | OUTPATIENT
Start: 2024-06-12 | End: 2024-06-12

## 2024-06-12 RX ORDER — OXYCODONE HYDROCHLORIDE 5 MG/1
5 TABLET ORAL ONCE
Status: COMPLETED | OUTPATIENT
Start: 2024-06-12 | End: 2024-06-12

## 2024-06-12 RX ORDER — LIDOCAINE HYDROCHLORIDE AND EPINEPHRINE 10; 10 MG/ML; UG/ML
INJECTION, SOLUTION INFILTRATION; PERINEURAL
Status: COMPLETED
Start: 2024-06-12 | End: 2024-06-12

## 2024-06-12 RX ORDER — ACETAMINOPHEN 500 MG
1000 TABLET ORAL ONCE
Status: COMPLETED | OUTPATIENT
Start: 2024-06-12 | End: 2024-06-12

## 2024-06-12 RX ADMIN — ACETAMINOPHEN 1000 MG: 500 TABLET ORAL at 17:48

## 2024-06-12 RX ADMIN — LIDOCAINE HYDROCHLORIDE AND EPINEPHRINE 3 ML: 10; 10 INJECTION, SOLUTION INFILTRATION; PERINEURAL at 22:30

## 2024-06-12 RX ADMIN — OXYCODONE HYDROCHLORIDE 5 MG: 5 TABLET ORAL at 20:40

## 2024-06-12 ASSESSMENT — COLUMBIA-SUICIDE SEVERITY RATING SCALE - C-SSRS
6. HAVE YOU EVER DONE ANYTHING, STARTED TO DO ANYTHING, OR PREPARED TO DO ANYTHING TO END YOUR LIFE?: NO
2. HAVE YOU ACTUALLY HAD ANY THOUGHTS OF KILLING YOURSELF IN THE PAST MONTH?: NO
1. IN THE PAST MONTH, HAVE YOU WISHED YOU WERE DEAD OR WISHED YOU COULD GO TO SLEEP AND NOT WAKE UP?: NO

## 2024-06-12 ASSESSMENT — ACTIVITIES OF DAILY LIVING (ADL)
ADLS_ACUITY_SCORE: 35
ADLS_ACUITY_SCORE: 33

## 2024-06-12 NOTE — ED PROVIDER NOTES
ED Provider Note  [facility]      History     Chief Complaint   Patient presents with    Foot Pain    Knee Pain     The history is provided by the patient and medical records. No  was used.   Knee Pain  Location:  Knee  Time since incident:  36 hours  Injury: no    Knee location:  R knee  Pain details:     Quality:  Aching, throbbing, burning and sharp    Radiates to:  R leg    Severity:  Moderate    Onset quality:  Sudden    Duration:  36 hours    Janine Cornell is a 59 year old female who presents to the ED with complaint of sudden onset worsened right knee pain and swelling.  Past medical history significant for Wegener's vasculitis, fibromyalgia, ANCA associated vasculitis, seronegative rheumatoid arthritis, diabetes, hypertension, hyperlipidemia, GERD, CAD.  She states that she began noticing worsening, nontraumatic pain to her right knee from her baseline osteoarthritis pain yesterday in the early evening.  She denies any trauma, strenuous activity, or known injury that caused her knee to become more painful.  She states that she also noted associated swelling around the right knee area when compared to her left knee.  She denies any warmth, redness, or rashes around the knee area.  She denies any breaks in her skin or wounds around the knee.  She denies symptoms similar to this in the past.  She reports some mild radiation of the pain down her right lower leg.  She states pain is worse when bearing weight, or actively bending at the knee.  She denies fever, chills, or nausea out of proportion from her baseline.     In addition to her knee symptoms, she does report a week or 2 long symptom of right forearm swelling.  There is some mild swelling noted when compared to her left forearm.  She denies any pain, warmth, redness, rashes to the area.  She denies any trauma.    Past Medical History  Past Medical History:   Diagnosis Date    Abnormal glandular Papanicolaou  smear of cervix     Allergic rhinitis     Allergy, airborne subst    Arthritis     ASCVD (arteriosclerotic cardiovascular disease)     Chronic pain     Chronic pancreatitis (H)     idiopathic, Tx: PPI, antioxidants, pancreatic enzymes    Common migraine     Coronary artery disease     Costochondritis     Difficulty in walking(719.7)     Dyspnea on exertion     Ectasia, mammary duct     [facility], persistent nipple discharge    Elevated fasting glucose     Gastro-oesophageal reflux disease     Granulomatosis with polyangiitis (H)     Hernia     History of angina     Hyperlipidemia LDL goal < 100     Hypertension goal BP (blood pressure) < 140/90     Essential hypertension    Iron deficiency anemia     Ischemic cardiomyopathy     Menorrhagia     Migraine headaches     Mild persistent asthma     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 Matias, neg w/u, Rx'd w/prednisone    Ventral hernia, unspecified, without mention of obstruction or gangrene 2012     Past Surgical History:   Procedure Laterality Date    C/SECTION, LOW TRANSVERSE  1996        CARDIAC SURGERY      cardiac stent- recent in 16; 4 other stents    COLONOSCOPY N/A 2023    Procedure: COLONOSCOPY, WITH POLYPECTOMY;  Surgeon: Percy Gross MD;  [place] [facility] [place]    DILATION AND CURETTAGE N/A 07/06/2016    Procedure: DILATION AND CURETTAGE;  Surgeon: Nahed Butler MD;  [facility]    ENDOMETRIAL SAMPLING (BIOPSY) N/A 4/28/2023    Procedure: ENDOMETRIAL BIOPSY;  Surgeon: Nahed Butler MD;  [facility]    ESOPHAGOSCOPY, GASTROSCOPY, DUODENOSCOPY (EGD), COMBINED N/A 03/31/2022    Procedure: ESOPHAGOGASTRODUODENOSCOPY, WITH BIOPSY;  Surgeon: Enzo Sesay MD;  [facility]: [facility]    ESOPHAGOSCOPY, GASTROSCOPY, DUODENOSCOPY (EGD), COMBINED N/A 5/25/2023    Procedure: ESOPHAGOGASTRODUODENOSCOPY, WITH BIOPSY;  Surgeon: Percy Gross MD;  Location: [facility] OR    EXAM UNDER ANESTHESIA PELVIC N/A 4/28/2023    Procedure: EXAM UNDER ANESTHESIA;  Surgeon: Nahed Butler MD;  [facility]    HC UGI ENDOSCOPY W EUS  11/07/2008    Dr. Pastrana, possible chronic pancreatitis, fatty liver    [place]IA [place]PA[place]  [place] [facility] [place] INTRAUTERINE DEVICE N/A 07/06/2016    Procedure: INSERT INTRAUTERINE DEVICE;  Surgeon: Nahed Butler MD;  [facility]    INT UTERINE FIBRIOD EMBOLIZATION  10/29/2014    LAPAROSCOPIC CHOLECYSTECTOMY  05/28/2008    Dr. Arnol GRUBBS TX, CERVICAL  1992    s/p LEEP, done at [facility] [place].    ORBITOTOMY Right 03/15/2016    Procedure: ORBITOTOMY;  Surgeon: Myron Cyr MD;  Location: [facility]    ORBITOTOMY Right 08/04/2017    Procedure: ORBITOTOMY;  RIGHT ORBITOTOMY AND BIOPSY;  Surgeon: Charis Holbrook MD;  Location: [facility]    REMOVE INTRAUTERINE DEVICE N/A 4/28/2023    Procedure: Remove intrauterine device,;  Surgeon: Nahed Butler MD;  [facility]    REPAIR PTOSIS Right 11/17/2017    Procedure: REPAIR PTOSIS;  RIGHT UPPER LID PTOSIS REPAIR;  Surgeon: Myron Cyr MD;  Location: [facility]    UPPER GI ENDOSCOPY  10/21/2008    mild gastritis, Dr. Rocky [facility] ECHO HEART XTHORACIC,COMPLETE, W/O DOPPLER  02/04/2004    [facility] WNL, EF 60%     acetaminophen  (TYLENOL) 325 MG tablet  ADVAIR DISKUS 250-50 MCG/ACT inhaler  albuterol (2.5 MG/3ML) 0.083% neb solution  albuterol (PROAIR HFA, PROVENTIL HFA, VENTOLIN HFA) 108 (90 BASE) MCG/ACT inhaler  BANOPHEN 25 MG tablet  blood glucose (NO BRAND SPECIFIED) lancets standard  blood glucose (NO BRAND SPECIFIED) test strip  blood glucose monitoring (NO BRAND SPECIFIED) meter device kit  Blood Pressure Monitor KIT  calcium carbonate (TUMS) 500 MG chewable tablet  clopidogrel (PLAVIX) 75 MG tablet  clotrimazole (MYCELEX) 10 MG lozenge  cyanocobalamin (VITAMIN B-12) 1000 MCG tablet  cyclobenzaprine (FLEXERIL) 10 MG tablet  cycloSPORINE (RESTASIS) 0.05 % ophthalmic emulsion  dicyclomine (BENTYL) 20 MG tablet  empagliflozin (JARDIANCE) 25 MG TABS tablet  EPIPEN 2-RIKY 0.3 MG/0.3ML injection  esomeprazole (NEXIUM) 40 MG DR capsule  estradiol (VAGIFEM) 10 MCG TABS vaginal tablet  fluticasone (FLONASE) 50 MCG/ACT nasal spray  folic acid (FOLVITE) 1 MG tablet  insulin glargine (LANTUS PEN) 100 UNIT/ML pen  insulin pen needle (BD PEN NEEDLE MARCK 2ND GEN) 32G X 4 MM miscellaneous  lisinopril-hydrochlorothiazide (ZESTORETIC) 20-25 MG tablet  magnesium 250 MG tablet  Magnesium Oxide 250 MG tablet  metFORMIN (GLUCOPHAGE XR) 500 MG 24 hr tablet  metoprolol succinate ER (TOPROL XL) 100 MG 24 hr tablet  Multiple Vitamin (TAB-A-GERALD) TABS  olopatadine (PATANOL) 0.1 % ophthalmic solution  ondansetron (ZOFRAN ODT) 8 MG ODT tab  pravastatin (PRAVACHOL) 40 MG tablet  pregabalin (LYRICA) 25 MG capsule  sennosides (SENOKOT) 8.6 MG tablet  spironolactone (ALDACTONE) 25 MG tablet  sucralfate (CARAFATE) 1 GM tablet  traMADol (ULTRAM) 50 MG tablet      Allergies   Allergen Reactions    Amoxicillin-Pot Clavulanate      Unknown possible hives and edema    Amoxicillin-Pot Clavulanate     Artificial Sweetner (Informational Only)  Other (See Comments)     Increased headache    Azithromycin     Clavulanic Acid     Diatrizoate Other (See Comments)     Pt wants  this listed because she is allergic to shellfish     Imitrex [Triptans]      Severe face/neck/chest tightness and flushing side effects     Penicillins Hives     Unknown     Pork Allergy      Stomach pain, cramping, diarrhea, itching, nausea and headaches    Shellfish Allergy Hives and Swelling    Shellfish-Derived Products     Sulfa Antibiotics Hives and Swelling    Sulfatolamide     Zithromax [Azithromycin Dihydrate] Swelling     Unknown      Family History  Family History   Problem Relation Age of Onset    Hypertension Mother     Arthritis Mother     Heart Disease Mother         long qt syndrome    Thyroid Disease Mother     C.A.D. Mother     Heart Disease Father 50        heart attack    Cerebrovascular Disease Father     Diabetes Father     Hypertension Father     Depression Father     C.A.D. Father     Neurologic Disorder Sister         migraines    Neurologic Disorder Sister         migraines    Heart Disease Brother 15        MI at 15, 16.     Arthritis Brother         he passed away, had severe arthritis at age 11    C.A.D. Brother     Anesthesia Reaction Son         PONV    Respiratory Son         asthma    Hypertension Maternal Aunt     Diabetes Maternal Uncle     Hypertension Maternal Uncle     Arthritis Maternal Uncle     Eye Disorder Maternal Uncle         cataracts    Cerebrovascular Disease Maternal Uncle     Family History Negative Other         neg for RA, SLE    Unknown/Adopted No family hx of         unknown neurological issues in her family, mother was adopted    Skin Cancer No family hx of         No known family hx of skin cancer    Deep Vein Thrombosis (DVT) No family hx of      Social History   Social History     Tobacco Use    Smoking status: Some Days     Current packs/day: 0.00     Average packs/day: 0.2 packs/day for 1 year (0.2 ttl pk-yrs)     Types: Cigarettes     Start date: 2015     Last attempt to quit: 2016     Years since quittin.3    Smokeless tobacco: Never  "  Vaping Use    Vaping status: Every Day    Substances: Nicotine   Substance Use Topics    Alcohol use: No     Alcohol/week: 0.0 standard drinks of alcohol    Drug use: No      Past medical history, past surgical history, medications, allergies, family history, and social history were reviewed with the patient. No additional pertinent items.     A medically appropriate review of systems was performed with pertinent positives and negatives noted in the HPI, and all other systems negative.    Physical Exam   BP: 113/80  Pulse: 70  Temp: 98.2  F (36.8  C)  Resp: 20  Height: 157.5 cm (5' 2\")  Weight: 73.9 kg (163 lb)  SpO2: 98 %  Physical Exam  Constitutional:       General: She is not in acute distress.     Appearance: Normal appearance. She is obese. She is not ill-appearing, toxic-appearing or diaphoretic.   Cardiovascular:      Rate and Rhythm: Normal rate and regular rhythm.      Pulses:           Dorsalis pedis pulses are 2+ on the right side and 2+ on the left side.   Pulmonary:      Effort: Pulmonary effort is normal.      Breath sounds: Normal breath sounds.   Musculoskeletal:      Right upper leg: Normal.      Left upper leg: Normal.      Right knee: Swelling present. No deformity, erythema, ecchymosis, lacerations, bony tenderness or crepitus. Normal range of motion. Tenderness present over the medial joint line, lateral joint line and patellar tendon. Normal patellar mobility.      Left knee: Normal.      Right lower leg: No edema.      Left lower leg: No edema.   Skin:     General: Skin is warm.      Findings: No erythema or rash.   Neurological:      General: No focal deficit present.      Mental Status: She is alert. Mental status is at baseline.      Sensory: No sensory deficit.      Motor: No weakness.   Psychiatric:         Mood and Affect: Mood normal.         Behavior: Behavior normal.         ED Course, Procedures, & Data     ED Course as of 06/12/24 2230 Wed Jun 12, 2024 1845 Uric Acid(!): 6.9 "   1845 CRP Inflammation(!): 7.13   2057 WBC(!): 14.5     Arthrocentesis    Date/Time: 6/14/2024 7:16 AM    Performed by: Dillon Shah MD  Authorized by: Dillon Shah MD    Risks, benefits and alternatives discussed.      LOCATION:   Location:  Knee  Knee:  R knee  ANESTHESIA (see MAR for exact dosages):   Anesthesia method:  Local infiltration  Local anesthetic:  Lidocaine 1% WITH epi    PROCEDURE DETAILS:     Needle gauge:  18 G    Ultrasound guidance: no      Approach:  Lateral    Aspirate amount:  50    Aspirate characteristics:  Cloudy and blood-tinged    Steroid injected: no      Specimen collected: yes      Dressing: adhesive bandage      PROCEDURE    Patient Tolerance:  Patient tolerated the procedure well with no immediate complications                   Results for orders placed or performed during the hospital encounter of 06/12/24   XR Knee Right 3 Views     Status: None    Narrative    EXAM: XR KNEE RIGHT 3 VIEWS  LOCATION: [facility]  DATE: 6/12/2024    INDICATION: Knee pain and swelling.  COMPARISON: 1/31/2024.      Impression    IMPRESSION: Moderate knee joint effusion. Normal joint alignment and spacing. No fracture or bone lesion.   US Lower Extremity Venous Duplex Right     Status: None    Narrative    EXAM: US LOWER EXTREMITY VENOUS DUPLEX RIGHT  LOCATION: [facility]  DATE: 6/12/2024    INDICATION: Nontraumatic spontaneous right knee swelling with pain radiation down the right lower extremity  COMPARISON: None.  TECHNIQUE: Venous Duplex ultrasound of the right lower extremity with and without compression, augmentation and duplex. Color flow and spectral Doppler with waveform analysis performed.    FINDINGS: Exam includes the common femoral, femoral, popliteal, and contralateral common femoral veins as well as segmentally visualized deep calf veins and greater saphenous vein.     RIGHT: No deep vein  thrombosis. No superficial thrombophlebitis. Right popliteal fossa cyst measuring 3.5 x 2.6 x 3.3 cm.      Impression    IMPRESSION:  1.  No deep venous thrombosis in the right lower extremity.   Basic metabolic panel     Status: Abnormal   Result Value Ref Range    Sodium 137 135 - 145 mmol/L    Potassium 3.4 3.4 - 5.3 mmol/L    Chloride 99 98 - 107 mmol/L    Carbon Dioxide (CO2) 26 22 - 29 mmol/L    Anion Gap 12 7 - 15 mmol/L    Urea Nitrogen 23.9 (H) 6.0 - 20.0 mg/dL    Creatinine 1.15 (H) 0.51 - 0.95 mg/dL    GFR Estimate 55 (L) >60 mL/min/1.73m2    Calcium 10.0 8.6 - 10.0 mg/dL    Glucose 97 70 - 99 mg/dL   CRP inflammation     Status: Abnormal   Result Value Ref Range    CRP Inflammation 7.13 (H) <5.00 mg/L   Erythrocyte sedimentation rate auto     Status: Normal   Result Value Ref Range    Erythrocyte Sedimentation Rate 11 0 - 30 mm/hr   Procalcitonin     Status: Normal   Result Value Ref Range    Procalcitonin 0.07 <0.50 ng/mL   Uric acid     Status: Abnormal   Result Value Ref Range    Uric Acid 6.9 (H) 2.4 - 5.7 mg/dL   CBC with platelets and differential     Status: Abnormal   Result Value Ref Range    WBC Count 14.5 (H) 4.0 - 11.0 10e3/uL    RBC Count 5.23 (H) 3.80 - 5.20 10e6/uL    Hemoglobin 13.3 11.7 - 15.7 g/dL    Hematocrit 40.5 35.0 - 47.0 %    MCV 77 (L) 78 - 100 fL    MCH 25.4 (L) 26.5 - 33.0 pg    MCHC 32.8 31.5 - 36.5 g/dL    RDW 14.6 10.0 - 15.0 %    Platelet Count 363 150 - 450 10e3/uL    % Neutrophils 72 %    % Lymphocytes 18 %    % Monocytes 8 %    % Eosinophils 2 %    % Basophils 0 %    % Immature Granulocytes 0 %    NRBCs per 100 WBC 0 <1 /100    Absolute Neutrophils 10.5 (H) 1.6 - 8.3 10e3/uL    Absolute Lymphocytes 2.5 0.8 - 5.3 10e3/uL    Absolute Monocytes 1.1 0.0 - 1.3 10e3/uL    Absolute Eosinophils 0.3 0.0 - 0.7 10e3/uL    Absolute Basophils 0.0 0.0 - 0.2 10e3/uL    Absolute Immature Granulocytes 0.0 <=0.4 10e3/uL    Absolute NRBCs 0.0 10e3/uL   HIV Rapid Antibody Screen      Status: None   Result Value Ref Range    HIV-1/HIV-2 Antibody Non Reactive Non Reactive    HIV1 P24 Antigen Non Reactive Non Reactive    HIV Interpretation      Narrative    All positive rapid HIV 1/2 Ag/Ab samples need to be confirmed by a second HIV 1/2 Ag/Ab Combination test.   CBC with platelets differential     Status: Abnormal    Narrative    The following orders were created for panel order CBC with platelets differential.  Procedure                               Abnormality         Status                     ---------                               -----------         ------                     CBC with platelets and d...[[id number]]  Abnormal            Final result                 Please view results for these tests on the individual orders.   [employer] BBFE Panel: Source Employee or Patient     Status: None (In process)    Narrative    The following orders were created for panel order [employer] BBFE Panel: Source Employee or Patient.  Procedure                               Abnormality         Status                     ---------                               -----------         ------                     HIV Rapid Antibody Screen[[id number]]                        Final result               HIV Antigen Antibody Com...[[id number]]                      In process                 Hepatitis B surface antigen[[id number]]                      In process                 Hepatitis C RNA, Quantit...[[id number]]                      In process                   Please view results for these tests on the individual orders.   Cell count with differential fluid     Status: None ()    Narrative    The following orders were created for panel order Cell count with differential fluid.  Procedure                               Abnormality         Status                     ---------                               -----------         ------                     Cell Count Body Fluid[[id number]]                                                          Please view results for these tests on the individual orders.   Results for orders placed or performed during the hospital encounter of 06/12/24   CT Orbits wo Contrast     Status: None    Narrative    EXAM: CT ORBITAL W/O CONTRAST  6/12/2024 3:30 PM     HISTORY:  follow up GPA affecting R orbit; Granulomatosis with  polyangiitis without renal involvement (H)       COMPARISON: CT orbits 8/22/2022, 9/14/2021, and 7/11/2019.      TECHNIQUE: Using thin collimation multidetector helical acquisition  technique, axial, coronal, sagittal CT images were obtained through  the orbits and upper facial bones, following intravenous  administration of nonionic iodinated contrast medium.    CONTRAST: None.    FINDINGS:     The soft tissue mass in the right superior lateral orbit involving the  extraconal and intraconal spaces and extending to the orbital apex,   is not significantly changed since 8/22/2022 and decreased since 2019.  There is abutment of the optic nerve by the mass. No erosion of the  orbital bones. Mild thickening of the right lateral orbital wall No  intracranial extension similar orbital CT 8/22/2022. The globes are  intact. Unchanged proptosis.    No preseptal, extraconal, or intraconal edema. No intraorbital  hematoma.    No orbital wall fracture. Clear paranasal sinuses and mastoid air  cells. Intact cribriform plate. Periapical lucencies to carries of the  left maxillary molars/premolars.    No focal abnormality of the visualized intracranial structures.      Impression    IMPRESSION:  1. Masslike soft tissue density in the superior lateral right orbit  with abutment of the right optic nerve, stable since 8/22/2022.  2. Stable proptosis since 8/22/2022.  3. Left maxillary periapical periodontitis.    I have personally reviewed the examination and initial interpretation  and I agree with the findings.    YVROSE JAMES MD         SYSTEM ID:  [id number]     Medications   lidocaine 1% with  EPINEPHrine 1:100,000 injection 1 mL (has no administration in time range)   lidocaine 1% with EPINEPHrine 1:100,000 1 %-1:[id number] injection (has no administration in time range)   acetaminophen (TYLENOL) tablet 1,000 mg (1,000 mg Oral $Given 6/12/24 1748)   oxyCODONE (ROXICODONE) tablet 5 mg (5 mg Oral $Given 6/12/24 2040)     Labs Ordered and Resulted from Time of ED Arrival to Time of ED Departure   BASIC METABOLIC PANEL - Abnormal       Result Value    Sodium 137      Potassium 3.4      Chloride 99      Carbon Dioxide (CO2) 26      Anion Gap 12      Urea Nitrogen 23.9 (*)     Creatinine 1.15 (*)     GFR Estimate 55 (*)     Calcium 10.0      Glucose 97     CRP INFLAMMATION - Abnormal    CRP Inflammation 7.13 (*)    URIC ACID - Abnormal    Uric Acid 6.9 (*)    CBC WITH PLATELETS AND DIFFERENTIAL - Abnormal    WBC Count 14.5 (*)     RBC Count 5.23 (*)     Hemoglobin 13.3      Hematocrit 40.5      MCV 77 (*)     MCH 25.4 (*)     MCHC 32.8      RDW 14.6      Platelet Count 363      % Neutrophils 72      % Lymphocytes 18      % Monocytes 8      % Eosinophils 2      % Basophils 0      % Immature Granulocytes 0      NRBCs per 100 WBC 0      Absolute Neutrophils 10.5 (*)     Absolute Lymphocytes 2.5      Absolute Monocytes 1.1      Absolute Eosinophils 0.3      Absolute Basophils 0.0      Absolute Immature Granulocytes 0.0      Absolute NRBCs 0.0     ERYTHROCYTE SEDIMENTATION RATE AUTO - Normal    Erythrocyte Sedimentation Rate 11     PROCALCITONIN - Normal    Procalcitonin 0.07     HIV RAPID ANTIBODY SCREEN    HIV-1/HIV-2 Antibody Non Reactive      HIV1 P24 Antigen Non Reactive      HIV Interpretation       HIV ANTIGEN ANTIBODY COMBO   HEPATITIS B SURFACE ANTIGEN   HEPATITIS C RNA, QUANTITATIVE BY PCR   CRYSTAL ID SYNOVIAL FLUID   GLUCOSE FLUID   PROTEIN FLUID   CELL COUNT BODY FLUID   AEROBIC BACTERIAL CULTURE ROUTINE   [employer] BBFE PANEL: SOURCE EMPLOYEE OR PATIENT   CELL COUNT WITH DIFFERENTIAL FLUID     XR Knee  Right 3 Views   Final Result   IMPRESSION: Moderate knee joint effusion. Normal joint alignment and spacing. No fracture or bone lesion.      US Lower Extremity Venous Duplex Right   Final Result   IMPRESSION:   1.  No deep venous thrombosis in the right lower extremity.             Critical care was not performed.     Medical Decision Making  The patient's presentation was of high complexity (an acute health issue posing potential threat to life or bodily function).    The patient's evaluation involved:  review of external note(s) from 1 sources (outpatient encounters)  review of 1 test result(s) ordered prior to this encounter (previous CBCs)  ordering and/or review of 3+ test(s) in this encounter (see separate area of note for details)    The patient's management necessitated further care after sign-out to Dr. Shah (see their note for further management).    Assessment & Plan    Janine is a 59-year-old female that presented to the ED with complaints of spontaneous, nontraumatic worsened right knee pain and swelling.  Supple vital signs on presentation without fever, tachycardia.  Patient in some acute distress due to reported pain but otherwise nontoxic-appearing and hemodynamically stable.  WBC 14.5 but this appears to be at patient's baseline.  ESR negative.  CRP minimally elevated at 7.13.  Uric acid 6.9.  Creatinine stable he elevated at 1.15.  Labs otherwise unremarkable and within normal limits.  Successful arthrocentesis with fluid collection for evaluation of protein, glucose, crystals, cell count, and aerobic culture. Patient tolerated the procedure. P.o. Tylenol p.o. oxycodone in the ED at this time for pain. Patient hand off was given to ED staff physician at the end of this ED CECI's shift pending synovial fluid results.  Suspect patient will discharge home with either symptomatic treatment with or without antibiotics as well as compression to the area with or without needed new assistive device.    I  have reviewed the nursing notes. I have reviewed the findings, diagnosis, plan and need for follow up with the patient.    New Prescriptions    No medications on file       Final diagnoses:   Effusion of right knee joint   Pain and swelling of right knee     Evelin Fernandez PA-C    --    ED Attending Physician Attestation    I Dillon Shah MD, MD, cared for this patient with the Advanced Practice Provider ([facility]). I personally provided a substantive portion of the care for this patient, including approving the care plan for the number and complexity of problems addressed and taking responsibility related to the risk of complications and/or morbidity or mortality of patient management. Please see the [facility]'s documentation for full details.    Summary of HPI, PE, ED Course   Patient is a 59 year old female with h/o Wegener's but not on steroid currently evaluated in the emergency department for right knee pain and swelling. Exam and ED course notable for knee effusion, neg US and XR otherwise, arthrocentesis done. After the completion of care in the emergency department, the patient was sign off to incoming EP for synovial fluids results-if septic joint, admit but otherwise discharge with possible prednisone and pain meds and close follow up.      Dillon Shah MD, MD  Emergency Medicine    Dillon Shah MD  [facility] EMERGENCY DEPARTMENT  6/12/2024     Evelin Fernandez PA-C  06/12/24 1598       Dillon Shah MD  06/14/24 3316

## 2024-06-12 NOTE — TELEPHONE ENCOUNTER
Left detailed message for patient- informed that RN called imaging to move MRI to sooner date. Provided date 7/5/24 at 8:00 am check in at [facility] [address] on the 1st floor [facility]. Informed that order cannot be placed as stat per RN note. Provided [phone] if patient has additional questions regarding this matter.

## 2024-06-12 NOTE — Clinical Note
Janine Cornell was seen and treated in our emergency department on 6/12/2024.  She may return to work on 06/17/2024.       If you have any questions or concerns, please don't hesitate to call.      Dillon Shah MD

## 2024-06-12 NOTE — ED TRIAGE NOTES
Patient arrives with pain and swelling to RIGHT knee and pain to RIGHT foot. Existing knee problems. Wearing knee brace, just started wearing this morning. No OTC meds at home.    Able to ambulate but with slow gait.     Triage Assessment (Adult)       Row Name 06/12/24 1544          Triage Assessment    Airway WDL WDL        Respiratory WDL    Respiratory WDL WDL        Skin Circulation/Temperature WDL    Skin Circulation/Temperature WDL WDL        Cardiac WDL    Cardiac WDL WDL        Peripheral/Neurovascular WDL    Peripheral Neurovascular WDL WDL        Cognitive/Neuro/Behavioral WDL    Cognitive/Neuro/Behavioral WDL WDL

## 2024-06-13 VITALS
HEIGHT: 62 IN | BODY MASS INDEX: 30 KG/M2 | TEMPERATURE: 98.4 F | RESPIRATION RATE: 16 BRPM | SYSTOLIC BLOOD PRESSURE: 108 MMHG | HEART RATE: 66 BPM | OXYGEN SATURATION: 99 % | WEIGHT: 163 LBS | DIASTOLIC BLOOD PRESSURE: 74 MMHG

## 2024-06-13 LAB
% LINING CELLS, BODY FLUID: 0 %
APPEARANCE FLD: ABNORMAL
BASOPHILS NFR FLD MANUAL: 0 %
CELL COUNT BODY FLUID SOURCE: ABNORMAL
COLOR FLD: ABNORMAL
CRYSTALS SNV MICRO: NORMAL
EOSINOPHIL NFR FLD MANUAL: 0 %
GLUCOSE BODY FLUID SOURCE: NORMAL
GLUCOSE FLD-MCNC: 123 MG/DL
HBV SURFACE AG SERPL QL IA: NONREACTIVE
HCV RNA SERPL NAA+PROBE-ACNC: NOT DETECTED IU/ML
HIV 1+2 AB+HIV1 P24 AG SERPL QL IA: NONREACTIVE
LYMPHOCYTES NFR FLD MANUAL: 2 %
MONOS+MACROS NFR FLD MANUAL: 15 %
NEUTS BAND NFR FLD MANUAL: 83 %
OTHER CELLS FLD MANUAL: 0 %
PROT FLD-MCNC: 4 G/DL
PROTEIN BODY FLUID SOURCE: NORMAL
WBC # FLD AUTO: 120 /UL

## 2024-06-13 PROCEDURE — 250N000013 HC RX MED GY IP 250 OP 250 PS 637: Performed by: EMERGENCY MEDICINE

## 2024-06-13 PROCEDURE — 250N000009 HC RX 250: Performed by: INTERNAL MEDICINE

## 2024-06-13 RX ORDER — PREDNISONE 20 MG/1
40 TABLET ORAL DAILY
Qty: 10 TABLET | Refills: 0 | Status: SHIPPED | OUTPATIENT
Start: 2024-06-13 | End: 2024-06-18

## 2024-06-13 RX ORDER — OXYCODONE HYDROCHLORIDE 5 MG/1
5 TABLET ORAL ONCE
Status: COMPLETED | OUTPATIENT
Start: 2024-06-13 | End: 2024-06-13

## 2024-06-13 RX ORDER — OXYCODONE HYDROCHLORIDE 5 MG/1
5 TABLET ORAL EVERY 6 HOURS PRN
Qty: 12 TABLET | Refills: 0 | Status: SHIPPED | OUTPATIENT
Start: 2024-06-13 | End: 2024-06-16

## 2024-06-13 RX ADMIN — OXYCODONE HYDROCHLORIDE 5 MG: 5 TABLET ORAL at 01:46

## 2024-06-13 ASSESSMENT — ACTIVITIES OF DAILY LIVING (ADL)
ADLS_ACUITY_SCORE: 35
ADLS_ACUITY_SCORE: 35

## 2024-06-13 NOTE — ED PROVIDER NOTES
Patient received as sign-out at change of shift.  Please see initial note for complete details.  59 year old female who presented to the ED with right knee pain.  Awaiting joint fluid analysis.  Total nucleated cells 120 at time of signout.  No organisms on Gram stain.  Crystals, protein, and glucose pending but will be hours to result as fluid needs to be sent to [facility] lab.  No evidence for infection based on fluid analysis thus far.  Will discharge with plan for outpatient follow-up.  Acute events during this shift: none.    Results for orders placed or performed during the hospital encounter of 06/12/24   XR Knee Right 3 Views     Status: None    Narrative    EXAM: XR KNEE RIGHT 3 VIEWS  LOCATION: [facility]  DATE: 6/12/2024    INDICATION: Knee pain and swelling.  COMPARISON: 1/31/2024.      Impression    IMPRESSION: Moderate knee joint effusion. Normal joint alignment and spacing. No fracture or bone lesion.   US Lower Extremity Venous Duplex Right     Status: None    Narrative    EXAM: US LOWER EXTREMITY VENOUS DUPLEX RIGHT  LOCATION: [facility]  DATE: 6/12/2024    INDICATION: Nontraumatic spontaneous right knee swelling with pain radiation down the right lower extremity  COMPARISON: None.  TECHNIQUE: Venous Duplex ultrasound of the right lower extremity with and without compression, augmentation and duplex. Color flow and spectral Doppler with waveform analysis performed.    FINDINGS: Exam includes the common femoral, femoral, popliteal, and contralateral common femoral veins as well as segmentally visualized deep calf veins and greater saphenous vein.     RIGHT: No deep vein thrombosis. No superficial thrombophlebitis. Right popliteal fossa cyst measuring 3.5 x 2.6 x 3.3 cm.      Impression    IMPRESSION:  1.  No deep venous thrombosis in the right lower extremity.   Basic metabolic panel     Status: Abnormal    Result Value Ref Range    Sodium 137 135 - 145 mmol/L    Potassium 3.4 3.4 - 5.3 mmol/L    Chloride 99 98 - 107 mmol/L    Carbon Dioxide (CO2) 26 22 - 29 mmol/L    Anion Gap 12 7 - 15 mmol/L    Urea Nitrogen 23.9 (H) 6.0 - 20.0 mg/dL    Creatinine 1.15 (H) 0.51 - 0.95 mg/dL    GFR Estimate 55 (L) >60 mL/min/1.73m2    Calcium 10.0 8.6 - 10.0 mg/dL    Glucose 97 70 - 99 mg/dL   CRP inflammation     Status: Abnormal   Result Value Ref Range    CRP Inflammation 7.13 (H) <5.00 mg/L   Erythrocyte sedimentation rate auto     Status: Normal   Result Value Ref Range    Erythrocyte Sedimentation Rate 11 0 - 30 mm/hr   Procalcitonin     Status: Normal   Result Value Ref Range    Procalcitonin 0.07 <0.50 ng/mL   Uric acid     Status: Abnormal   Result Value Ref Range    Uric Acid 6.9 (H) 2.4 - 5.7 mg/dL   CBC with platelets and differential     Status: Abnormal   Result Value Ref Range    WBC Count 14.5 (H) 4.0 - 11.0 10e3/uL    RBC Count 5.23 (H) 3.80 - 5.20 10e6/uL    Hemoglobin 13.3 11.7 - 15.7 g/dL    Hematocrit 40.5 35.0 - 47.0 %    MCV 77 (L) 78 - 100 fL    MCH 25.4 (L) 26.5 - 33.0 pg    MCHC 32.8 31.5 - 36.5 g/dL    RDW 14.6 10.0 - 15.0 %    Platelet Count 363 150 - 450 10e3/uL    % Neutrophils 72 %    % Lymphocytes 18 %    % Monocytes 8 %    % Eosinophils 2 %    % Basophils 0 %    % Immature Granulocytes 0 %    NRBCs per 100 WBC 0 <1 /100    Absolute Neutrophils 10.5 (H) 1.6 - 8.3 10e3/uL    Absolute Lymphocytes 2.5 0.8 - 5.3 10e3/uL    Absolute Monocytes 1.1 0.0 - 1.3 10e3/uL    Absolute Eosinophils 0.3 0.0 - 0.7 10e3/uL    Absolute Basophils 0.0 0.0 - 0.2 10e3/uL    Absolute Immature Granulocytes 0.0 <=0.4 10e3/uL    Absolute NRBCs 0.0 10e3/uL   HIV Rapid Antibody Screen     Status: None   Result Value Ref Range    HIV-1/HIV-2 Antibody Non Reactive Non Reactive    HIV1 P24 Antigen Non Reactive Non Reactive    HIV Interpretation      Narrative    All positive rapid HIV 1/2 Ag/Ab samples need to be confirmed by a  second HIV 1/2 Ag/Ab Combination test.   Glucose fluid     Status: None   Result Value Ref Range    Glucose Fluid Source Knee, Right     Glucose fluid 123 mg/dL    Narrative    No reference ranges have been established. This result should be interpreted in the context of the patient's clinical condition and compared to simultaneous measurement in the patient's blood. This is a lab developed test. It has not been cleared or approved by the FDA. FDA clearance is not required for clinical use.   Protein fluid     Status: None   Result Value Ref Range    Protein Fluid Source Knee, Right     Protein Total Fluid 4.0 g/dL    Narrative    No reference ranges have been established. This result should be interpreted in the context of the patient's clinical condition and compared to simultaneous measurement in the patient's blood. This is a lab developed test. It has not been cleared or approved by the FDA. FDA clearance is not required for clinical use.   Cell Count Body Fluid     Status: Abnormal   Result Value Ref Range    Color Pink (A) Colorless, Yellow    Clarity Hazy (A) Clear    Cell Count Fluid Source Knee, Right     Total Nucleated Cells 120 /uL    Narrative    No reference ranges have been established.  This result  should be interpreted in the context of the patient's clinical condition and   compared to simultaneous measurement in the patient's blood.         Differential Body Fluid     Status: None   Result Value Ref Range    % Neutrophils 83 %    % Lymphocytes 2 %    % Monocyte/Macrophages 15 %    % Eosinophils 0 %    % Basophils 0 %    % Lining Cells 0 %    % Other Cells 0 %    Narrative    No reference ranges have been established. This result should be interpreted in the context of the patient's clinical condition and compared to simultaneous measurement in the patient's blood.   Synovial fluid Aerobic Bacterial Culture Routine With Gram Stain     Status: None (Preliminary result)    Specimen: Knee, Right;  Synovial fluid   Result Value Ref Range    Gram Stain Result No organisms seen     Gram Stain Result 3+ WBC seen    CBC with platelets differential     Status: Abnormal    Narrative    The following orders were created for panel order CBC with platelets differential.  Procedure                               Abnormality         Status                     ---------                               -----------         ------                     CBC with platelets and d...[[id number]]  Abnormal            Final result                 Please view results for these tests on the individual orders.   [facility] [facility] Panel: Source Employee or Patient     Status: None (In process)    Narrative    The following orders were created for panel order [facility] & [facility] [facility] Panel: Source Employee or Patient.  Procedure                               Abnormality         Status                     ---------                               -----------         ------                     HIV Rapid Antibody Screen[[id number]]                        Final result               HIV Antigen Antibody Com...[[id number]]                      In process                 Hepatitis B surface antigen[[id number]]                      In process                 Hepatitis C RNA, Quantit...[[id number]]                      In process                   Please view results for these tests on the individual orders.   Cell count with differential fluid     Status: Abnormal    Narrative    The following orders were created for panel order Cell count with differential fluid.  Procedure                               Abnormality         Status                     ---------                               -----------         ------                     Cell Count Body Fluid[[id number]]        Abnormal            Final result               Differential Body Fluid[[id number]]                          Final result                 Please view results for these tests on the individual  ashlee.         Lenny Gomez MD  06/13/24 0557

## 2024-06-15 ENCOUNTER — TELEPHONE (OUTPATIENT)
Dept: NURSING | Facility: CLINIC | Age: 59
End: 2024-06-15
Payer: COMMERCIAL

## 2024-06-15 NOTE — TELEPHONE ENCOUNTER
Janine calling requesting results for the following:           Advised she will get a call if the final culture is positive. Went over AVS instruction from ED visit and she agreed to call PCP on Monday for an appt per instructions.

## 2024-06-17 LAB
BACTERIA SNV CULT: NO GROWTH
GRAM STAIN RESULT: NORMAL
GRAM STAIN RESULT: NORMAL

## 2024-06-26 ENCOUNTER — VIRTUAL VISIT (OUTPATIENT)
Dept: RHEUMATOLOGY | Facility: CLINIC | Age: 59
End: 2024-06-26
Attending: INTERNAL MEDICINE
Payer: COMMERCIAL

## 2024-06-26 ENCOUNTER — TELEPHONE (OUTPATIENT)
Dept: RHEUMATOLOGY | Facility: CLINIC | Age: 59
End: 2024-06-26
Payer: COMMERCIAL

## 2024-06-26 DIAGNOSIS — M31.30 GRANULOMATOSIS WITH POLYANGIITIS WITHOUT RENAL INVOLVEMENT (H): ICD-10-CM

## 2024-06-26 DIAGNOSIS — I77.82 ANCA-ASSOCIATED VASCULITIS (H): Primary | ICD-10-CM

## 2024-06-26 NOTE — Clinical Note
"6/26/2024       RE: Janine Cornell  [address]  [facility] [address]     Dear Colleague,    Thank you for referring your patient, Janine Cornell, to the [facility] [place] [facility]. Please see a copy of my visit note below.    Medication Therapy Management (MTM) Encounter    ASSESSMENT:                            Medication Adherence/Access: {adherencechoices:118875}    ***:  ***      PLAN:                            ***    Follow-up: {followuptest2:680878}    SUBJECTIVE/OBJECTIVE:                          Janine Cornell is a 59 year old female seen for {mtmvisit:174779}     Reason for visit: ***.    Allergies/ADRs: {1/2/3/4/5:631045}  Past Medical History: {1/2/3/4/5:483223}  Tobacco: She reports that she has been smoking cigarettes. She started smoking about 9 years ago. She has a 0.2 pack-year smoking history. She has never used smokeless tobacco.Nicotine/Tobacco Cessation Plan  {Nicotine/Tobacco Cessation Plan:721550}    Alcohol: {ALCOHOL CONSUMPTION HX:331408}  {Social and Goals:321514}    Medication Adherence/Access: {fumedadherence:463578}    ***:   ***    Today's Vitals: LMP  (LMP Unknown)   ----------------  {JOEY?:341593}    I spent {mtm total time 3:627272} with this patient today. { :243451}. A copy of the visit note was provided to the patient's provider(s).    A summary of these recommendations {GIVEN/NOT GIVEN:664786}.    ***    Telemedicine Visit Details  Type of service:  {telemedvisitmtm:296862::\"Telephone visit\"}  Start Time: {video/phone visit start time:152948}  End Time: {video/phone visit end time:152948}     Medication Therapy Recommendations  No medication therapy recommendations to display       Medication Therapy Management (MTM) Encounter    ASSESSMENT:                            Patient prefers to only discuss rheumatology conditions and medications today. Will plan to assess additional medications and " conditions at follow up.    Medication Adherence/Access: No issues identified    Vasculitis/GPA: Patient would benefit from moving up rituximab dose due to increased symptoms. Reviewed process to schedule an infusion appointment. Discussed recommendation to see orthopedics for knee pain. Will contact rheumatologist to get new orthopedic referral.    PLAN:                            1. Please call [phone] to schedule your next rituximab infusion for July 2024.    2. I will talk to Dr. Mckinnon about placing a new orthopedic referral for you.    Follow-up: Return as needed.    SUBJECTIVE/OBJECTIVE:                          Janine Cornell is a 59 year old female seen for an initial visit. She was referred to me from Majo Mckinnon MD.      Reason for visit: Rituximab coordination     Allergies/ADRs: Reviewed in chart  Past Medical History: Reviewed in chart  Tobacco: She reports that she has been smoking cigarettes. She started smoking about 9 years ago. She has a 0.2 pack-year smoking history. She has never used smokeless tobacco.  Alcohol: not currently using    Medication Adherence/Access: no issues reported    Vasculitis/GPA:   Rituximab infusions (Last dose: 2/28/24)  Pregabalin 25 mg daily    Reports she continues to have more joint pain - particularly in her knee and back. Notes she is also having headaches and nerve pain. Rheumatologist recommended moving up rituximab infusion to June 2024 (was due August 2024) - notes she was unaware of this recommendation. Rheumatologist also recommended seeing orthopedics for knee pain - reports she does not have a current orthopedic provider and needs a new referral.    Liver Function Studies -   Recent Labs   Lab Test 06/05/24  1351 02/12/24  0856 12/14/23  1414   PROTTOTAL  --   --  7.3   ALBUMIN 4.9   < > 4.6   BILITOTAL  --   --  0.3   ALKPHOS  --   --  77   AST 24   < > 25   ALT 27   < > 22    < > = values in this interval not displayed.     CBC RESULTS:   Recent  Labs   Lab Test 06/12/24  1758   WBC 14.5*   RBC 5.23*   HGB 13.3   HCT 40.5   MCV 77*   MCH 25.4*   MCHC 32.8   RDW 14.6        Today's Vitals: LMP  (LMP Unknown)   ----------------  Post Discharge Medication Reconciliation Status: discharge medications reconciled, continue medications without change.    I spent 15 minutes with this patient today. All changes were made via collaborative practice agreement with Majo Mckinnon. A copy of the visit note was provided to the patient's provider(s).    A summary of these recommendations was sent via [employer].    Eunice Carter PharmD  Medication Therapy Management Pharmacist  [facility]  Phone: [phone]    Telemedicine Visit Details  Type of service:  Telephone visit  Start Time: 2:30 PM  End Time: 2:45 PM     Medication Therapy Recommendations  No medication therapy recommendations to display         Again, thank you for allowing me to participate in the care of your patient.      Sincerely,    EUNICE CARTER [facility]

## 2024-06-27 DIAGNOSIS — M19.90 INFLAMMATORY ARTHRITIS: ICD-10-CM

## 2024-06-29 DIAGNOSIS — M25.561 ACUTE PAIN OF RIGHT KNEE: Primary | ICD-10-CM

## 2024-07-01 ENCOUNTER — TELEPHONE (OUTPATIENT)
Dept: INTERNAL MEDICINE | Facility: CLINIC | Age: 59
End: 2024-07-01
Payer: COMMERCIAL

## 2024-07-01 NOTE — TELEPHONE ENCOUNTER
See my note on 6/13/24 TE.    PCP recommended the pt to have an appt.  I called the pt and she agreed to have a virtual appt tomorrow, 7/2/24.

## 2024-07-02 ENCOUNTER — VIRTUAL VISIT (OUTPATIENT)
Dept: FAMILY MEDICINE | Facility: CLINIC | Age: 59
End: 2024-07-02
Payer: COMMERCIAL

## 2024-07-02 ENCOUNTER — TELEPHONE (OUTPATIENT)
Dept: FAMILY MEDICINE | Facility: CLINIC | Age: 59
End: 2024-07-02

## 2024-07-02 DIAGNOSIS — E11.9 TYPE 2 DIABETES, HBA1C GOAL < 7% (H): ICD-10-CM

## 2024-07-02 DIAGNOSIS — M25.561 RIGHT KNEE PAIN, UNSPECIFIED CHRONICITY: Primary | ICD-10-CM

## 2024-07-02 PROCEDURE — 99214 OFFICE O/P EST MOD 30 MIN: CPT | Mod: 95 | Performed by: FAMILY MEDICINE

## 2024-07-02 PROCEDURE — G2211 COMPLEX E/M VISIT ADD ON: HCPCS | Performed by: FAMILY MEDICINE

## 2024-07-02 NOTE — PATIENT INSTRUCTIONS
Thank you for visiting the Primary Care Center today at the [facility]! The following is some information about our clinic:     Primary Care Center Frequently-Asked Questions    (1) How do I schedule appointments at the [facility]?     Primary Care--to schedule or make changes to an existing appointment, please call our primary care line at [phone].    Labs--to schedule a lab appointment at the [facility] you can use [employer] or call [phone]. If you have a [facility] location that is closer to home, you can reach out to that location for scheduling options.     Imaging--if you need to schedule a CT, X-ray, MRI, ultrasound, or other imaging study you can call [phone] to schedule at the [facility] or any other [facility] imaging location.     Referrals--if a referral to another specialty was ordered you can expect a phone call from their scheduling team. If you have not heard from them in a week, please call us or send us a [employer] message to check the status or get a scheduling number. Please note that this only applies to internal [facility] referrals. If the referral is external you would need to contact their office for scheduling.     (2) I have a question about my visit, who do I contact?     You can call us at the primary care line at [phone] to ask questions about your visit. You can also send a secure message through [employer], which is reviewed by clinic staff. Please note that [employer] messages have a twenty-four to forty-eight business hour turnaround time and should not be used for urgent concerns.    (3) How will I get the results of my tests?    If you are signed up for [employer] all tests will be released to you within twenty-four hours of resulting. Please allow three to five days for your doctor to review your results and place a note interpreting the results. If you do not have [employer] you will receive your  results through mail seven to ten business days following the return of the tests. Please note that if there should be any urgent or concerning results that your doctor or their registered nurse will reach out to you the same day as the tests come back. If you have follow up questions about your results or would like to discuss the results in detail please schedule a follow up with your provider either in person or virtually.     (4) How do I get refills of my prescriptions?     You should always first contact your pharmacy for refills of your medications. If submitting a refill request on [employer], please be sure to submit the request only once--repeat requests can cause delays in refill. If you are requesting a NEW medication or a medication related to new symptoms you will need to schedule an appointment with a provider prior to approval. Please note: Routine medication refills have up to one to three business day turnaround whereas controlled substances refills have up to five to seven business day turnaround.    (5) I have new symptoms, what do I do?     If you are having an immediate medical emergency, you should dial 911 for assistance.   For anything urgent that needs to be seen within a few hours to one day you should visit a local urgent care for assistance.  For non-urgent symptoms that need to be seen within a few days to a week you can schedule with an available provider in primary care by going to [email or web address] or calling [phone].   If you are not sure how serious your symptoms are or you would like to receive medical advice you can always call [phone] to speak with a triage nurse.

## 2024-07-02 NOTE — TELEPHONE ENCOUNTER
Mri supervisor approved add on for 7/11 330pm MR Knee. Sb patient-     Patient will seek Same Day access appointment with [facility] due to none of the available next openings work for pt schedule. Provided [facility] number.

## 2024-07-02 NOTE — PROGRESS NOTES
Janine is a 59 year old who is being evaluated via a billable video visit.    How would you like to obtain your AVS? Mail a copy  If the video visit is dropped, the invitation should be resent by: Text to cell phone: [phone]  Will anyone else be joining your video visit? No      Are refills needed on medications prescribed by this physician? NO      Assessment & Plan     Right knee pain, unspecified chronicity  As other lower extremity joint pain as well, but right knee the worst. Will try to add MR knee to next week spine MR visit, hard for her to get down here. Per pt took pred burst after ER visit not significantly helping.   - MR Knee Right w/o Contrast; Future  - Orthopedic  Referral; Future    Type 2 diabetes, HbA1c goal < 7% (H)  Recheck lab around Labor Day.    She will do video visit w/ me in two weeks follow-up MR's and Sp Med    The longitudinal plan of care for the diagnosis(es)/condition(s) as documented were addressed during this visit. Due to the added complexity in care, I will continue to support Janine in the subsequent management and with ongoing continuity of care.  33 minutes spent by me on the date of the encounter doing chart review, history and exam, documentation and further activities per the note            No follow-ups on file.    Subjective   Janine is a 59 year old, presenting for the following health issues:  RECHECK and ED Follow Up (ED visit in June)      Video Start Time:  10:30 am    HPI Here in follow-up  1-when I saw her last mo, hgba1c 8.4, had increased Jaridance about a mo earlier dose so will recheck this lab in a few mo  2-MR spine set for next week  3-ER note rvwd, R knee pain/swelling. XR no bony abnl noted, fluid negative for infection/gout. Sees [facility] for autoimmune disorder. Pt notes some L knee pain and hip ankle pain too, plus, some   Past Medical History:   Diagnosis Date    Abnormal glandular Papanicolaou smear of cervix 1992    Allergic rhinitis      Allergy, airborne subst    Arthritis     ASCVD (arteriosclerotic cardiovascular disease)     Chronic pain     Chronic pancreatitis (H)     idiopathic, Tx: PPI, antioxidants, pancreatic enzymes    Common migraine     Coronary artery disease     Costochondritis     Difficulty in walking(719.7)     Dyspnea on exertion     Ectasia, mammary duct     [facility], persistent nipple discharge    Elevated fasting glucose     Gastro-oesophageal reflux disease     Granulomatosis with polyangiitis (H)     Hernia     History of angina     Hyperlipidemia LDL goal < 100     Hypertension goal BP (blood pressure) < 140/90     Essential hypertension    Iron deficiency anemia     Ischemic cardiomyopathy     Menorrhagia     Migraine headaches     Mild persistent asthma     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 Matias, neg w/u, Rx'd w/prednisone    Ventral hernia, unspecified, without mention of obstruction or gangrene 2012     Past Surgical History:   Procedure Laterality Date    C/SECTION, LOW TRANSVERSE  1996        CARDIAC SURGERY      cardiac stent- recent in 16; 4 other stents    COLONOSCOPY N/A 2023    Procedure: COLONOSCOPY, WITH POLYPECTOMY;  Surgeon: Percy Gross MD;  Location: [facility] OR    DILATION AND CURETTAGE N/A 2016     Procedure: DILATION AND CURETTAGE;  Surgeon: Nahed Butler MD;  [facility]    ENDOMETRIAL SAMPLING (BIOPSY) N/A 4/28/2023    Procedure: ENDOMETRIAL BIOPSY;  Surgeon: Nahed Butler MD;  [facility]    ESOPHAGOSCOPY, GASTROSCOPY, DUODENOSCOPY (EGD), COMBINED N/A 03/31/2022    Procedure: ESOPHAGOGASTRODUODENOSCOPY, WITH BIOPSY;  Surgeon: Enzo Sesay MD;  [facility]    ESOPHAGOSCOPY, GASTROSCOPY, DUODENOSCOPY (EGD), COMBINED N/A 5/25/2023    Procedure: ESOPHAGOGASTRODUODENOSCOPY, WITH BIOPSY;  Surgeon: Percy Gross MD;  Location: [facility] OR    EXAM UNDER ANESTHESIA PELVIC N/A 4/28/2023    Procedure: EXAM UNDER ANESTHESIA;  Surgeon: Nahed Butler MD;  [facility]    HC UGI ENDOSCOPY W EUS  11/07/2008    Dr. Pastrana, possible chronic pancreatitis, fatty liver    HERNIA REPAIR  12/01/2012    done at [facility] [place]    INSERT INTRAUTERINE DEVICE N/A 07/06/2016    Procedure: INSERT INTRAUTERINE DEVICE;  Surgeon: Nahed Butler MD;  [facility]    INT UTERINE FIBRIOD EMBOLIZATION  10/29/2014    LAPAROSCOPIC CHOLECYSTECTOMY  05/28/2008    Dr. Arnol GRUBBS TX, CERVICAL  1992    s/p LEEP, done at [facility] in [place].    ORBITOTOMY Right 03/15/2016    Procedure: ORBITOTOMY;  Surgeon: Myron Cyr MD;  Location: [facility]    ORBITOTOMY Right 08/04/2017    Procedure: ORBITOTOMY;  RIGHT ORBITOTOMY AND BIOPSY;  Surgeon: Charis Holbrook MD;  Location: [facility]    REMOVE INTRAUTERINE DEVICE N/A 4/28/2023    Procedure: Remove intrauterine device,;  Surgeon: Nahed Butler MD;  [facility]    REPAIR PTOSIS Right 11/17/2017    Procedure: REPAIR PTOSIS;  RIGHT UPPER LID PTOSIS REPAIR;  Surgeon: Myron Cyr MD;  Location: [facility]    UPPER GI ENDOSCOPY  10/21/2008    mild gastritis, Dr. Rocky [facility] ECHO HEART XTHORACIC,COMPLETE, W/O DOPPLER  02/04/2004    [facility] WNL, EF 60%     Current Outpatient Medications   Medication Sig Dispense  Refill    acetaminophen (TYLENOL) 325 MG tablet Take 1-2 tablets (325-650 mg) by mouth every 6 hours as needed for pain (headache) 250 tablet 4    ADVAIR DISKUS 250-50 MCG/ACT inhaler Inhale 1 puff into the lungs 2 times daily      albuterol (2.5 MG/3ML) 0.083% neb solution INL 1 VIAL VIA NEBULIZATION Q 4 TO 6 HOURS PRN  1    albuterol (PROAIR HFA, PROVENTIL HFA, VENTOLIN HFA) 108 (90 BASE) MCG/ACT inhaler Inhale 2 puffs into the lungs every 6 hours as needed for shortness of breath / dyspnea or wheezing 3 Inhaler 1    BANOPHEN 25 MG tablet Take 25 mg by mouth At Bedtime      blood glucose (NO BRAND SPECIFIED) lancets standard Use to test blood sugar 1-4 times daily or as directed. 400 each 3    blood glucose (NO BRAND SPECIFIED) test strip Use to test blood sugar fasting each am and predinner daily. 250 strip 3    blood glucose monitoring (NO BRAND SPECIFIED) meter device kit Use to test blood sugar 2 times daily or as directed. 1 kit 0    Blood Pressure Monitor KIT 1 each daily Monitor home blood pressure as instructed by physician.  Dispense [place]on blood pressure kit. 1 kit 0    calcium carbonate (TUMS) 500 MG chewable tablet Take 3-4 tablets (1,500-2,000 mg) by mouth daily as needed 90 tablet 3    clopidogrel (PLAVIX) 75 MG tablet Take 1 tablet (75 mg) by mouth daily . 30 tablet 6    clotrimazole (MYCELEX) 10 MG lozenge Place 1 lozenge (10 mg) inside cheek 5 times daily 50 lozenge 1    cyanocobalamin (VITAMIN B-12) 1000 MCG tablet Take 1 tablet by mouth daily at 2 pm      cyclobenzaprine (FLEXERIL) 10 MG tablet Take 1 tablet (10 mg) by mouth 2 times daily as needed for muscle spasms 60 tablet 3    cycloSPORINE (RESTASIS) 0.05 % ophthalmic emulsion 1 month supply 11 refills 1 each 11    dicyclomine (BENTYL) 20 MG tablet TAKE 1 TABLET(20 MG) BY MOUTH FOUR TIMES DAILY AS NEEDED. 60 tablet 4    empagliflozin (JARDIANCE) 25 MG TABS tablet Take 1 tablet (25 mg) by mouth daily 90 tablet 2    EPIPEN 2-RIKY 0.3 MG/0.3ML  injection INJECT 0.3 MG INTO THE MUSCLE PRF ANAPHYALAXIS  0    esomeprazole (NEXIUM) 40 MG DR capsule Take 1 capsule (40 mg) by mouth 2 times daily Take 30-60 minutes before eating. 180 capsule 0    estradiol (VAGIFEM) 10 MCG TABS vaginal tablet Place 1 tablet (10 mcg) vaginally twice a week 24 tablet 3    fluticasone (FLONASE) 50 MCG/ACT nasal spray Spray 1 spray in nostril as needed      folic acid (FOLVITE) 1 MG tablet TAKE 1 TABLET BY MOUTH EVERY DAY 90 tablet 0    insulin glargine (LANTUS PEN) 100 UNIT/ML pen Inject 56 Units Subcutaneous every morning Or as directed. 75 mL 1    insulin pen needle (BD PEN NEEDLE MARCK 2ND GEN) 32G X 4 MM miscellaneous USE ONE PEN NEEDLE DAILY OR AS DIRECTED 100 each 2    lisinopril-hydrochlorothiazide (ZESTORETIC) 20-25 MG tablet Take 1 tablet by mouth daily 30 tablet 6    magnesium 250 MG tablet TAKE 1 TABLET(250 MG) BY MOUTH TWICE DAILY 60 tablet 3    Magnesium Oxide 250 MG tablet TAKE 1 TABLET(250 MG) BY MOUTH TWICE DAILY 60 tablet 3    metFORMIN (GLUCOPHAGE XR) 500 MG 24 hr tablet Take 4 tablets (2,000 mg) by mouth daily (with dinner) 360 tablet 3    metoprolol succinate ER (TOPROL XL) 100 MG 24 hr tablet Take 1 tablet (100 mg) by mouth daily 30 tablet 6    Multiple Vitamin (TAB-A-GERALD) TABS Take 1 tablet by mouth daily 90 tablet 1    olopatadine (PATANOL) 0.1 % ophthalmic solution Place 1 drop into both eyes as needed      ondansetron (ZOFRAN ODT) 8 MG ODT tab TAKE 1 TABLET BY MOUTH EVERY 8 HOURS AS NEEDED FOR NAUSEA 20 tablet 1    pravastatin (PRAVACHOL) 40 MG tablet Take 1 tablet (40 mg) by mouth daily 30 tablet 6    pregabalin (LYRICA) 25 MG capsule TAKE 1 CAPSULE BY MOUTH EVERY DAY 30 capsule 1    sennosides (SENOKOT) 8.6 MG tablet 1-2 tabs a day as needed for constipation 60 tablet 1    spironolactone (ALDACTONE) 25 MG tablet Take 1 tablet (25 mg) by mouth daily. May also take 0.5 tablets (12.5 mg) daily as needed (for weight gain). 45 tablet 6    sucralfate (CARAFATE)  1 GM tablet Take 1 tablet (1 g) by mouth 4 times daily 120 tablet 3    traMADol (ULTRAM) 50 MG tablet TAKE 1 TABLET(50 MG) BY MOUTH EVERY 8 HOURS AS NEEDED FOR SEVERE PAIN 60 tablet 3     No current facility-administered medications for this visit.     Allergies   Allergen Reactions    Amoxicillin-Pot Clavulanate      Unknown possible hives and edema    Amoxicillin-Pot Clavulanate     Artificial Sweetner (Informational Only)  Other (See Comments)     Increased headache    Azithromycin     Clavulanic Acid     Diatrizoate Other (See Comments)     Pt wants this listed because she is allergic to shellfish     Imitrex [Triptans]      Severe face/neck/chest tightness and flushing side effects     Penicillins Hives     Unknown     Pork Allergy      Stomach pain, cramping, diarrhea, itching, nausea and headaches    Shellfish Allergy Hives and Swelling    Shellfish-Derived Products     Sulfa Antibiotics Hives and Swelling    Sulfatolamide     Zithromax [Azithromycin Dihydrate] Swelling     Unknown      Family History   Problem Relation Age of Onset    Hypertension Mother     Arthritis Mother     Heart Disease Mother         long qt syndrome    Thyroid Disease Mother     C.A.D. Mother     Heart Disease Father 50        heart attack    Cerebrovascular Disease Father     Diabetes Father     Hypertension Father     Depression Father     C.A.D. Father     Neurologic Disorder Sister         migraines    Neurologic Disorder Sister         migraines    Heart Disease Brother 15        MI at 15, 16.     Arthritis Brother         he passed away, had severe arthritis at age 11    C.A.D. Brother     Anesthesia Reaction Son         PONV    Respiratory Son         asthma    Hypertension Maternal Aunt     Diabetes Maternal Uncle     Hypertension Maternal Uncle     Arthritis Maternal Uncle     Eye Disorder Maternal Uncle         cataracts    Cerebrovascular Disease Maternal Uncle     Family History Negative Other         neg for RA,  SLE    Unknown/Adopted No family hx of         unknown neurological issues in her family, mother was adopted    Skin Cancer No family hx of         No known family hx of skin cancer    Deep Vein Thrombosis (DVT) No family hx of      Social History     Socioeconomic History    Marital status: Single     Spouse name: Not on file    Number of children: 1    Years of education: Not on file    Highest education level: Not on file   Occupational History     Employer: NONE    Tobacco Use    Smoking status: Some Days     Current packs/day: 0.00     Average packs/day: 0.2 packs/day for 1 year (0.2 ttl pk-yrs)     Types: Cigarettes     Start date: 2015     Last attempt to quit: 2016     Years since quittin.4    Smokeless tobacco: Never   Vaping Use    Vaping status: Every Day    Substances: Nicotine   Substance and Sexual Activity    Alcohol use: No     Alcohol/week: 0.0 standard drinks of alcohol    Drug use: No    Sexual activity: Not Currently   Other Topics Concern    Parent/sibling w/ CABG, MI or angioplasty before 65F 55M? Yes   Social History Narrative    Balanced Diet - sometimes    Osteoporosis Prevention Measures - Dairy servings per day: 2 servings weekly    Regular Exercise -  Yes Describe walking 4 times a week    Dental Exam - NO    Seatbelts used - Yes    Self Breast Exam - Yes    Abuse: Current or Past (Physical, Sexual or Emotional)- No    Do you have any concerns about STD's -  No    Do you feel safe in your environment - No    Guns stored in the home - No    Sunscreen used - Yes    Lipids -  YES - Date:     Glucose -  YES - Date:     Eye Exam - YES - Date: one year ago    Colon Cancer Screening - No    Hemoccults - NO    Pap Test -  YES - Date: , remote history of LEEP    Mammography - YES - Date: last spring, would like to discuss, needs a referral to [facility]    DEXA - NO    Immunizations reviewed and up to date - Yes, last td given in  or       Social Determinants of Health     Financial Resource Strain: Low Risk  (2/9/2024)    Financial Resource Strain     Within the past 12 months, have you or your family members you live with been unable to get utilities (heat, electricity) when it was really needed?: No   Food Insecurity: High Risk (2/9/2024)    Food Insecurity     Within the past 12 months, did you worry that your food would run out before you got money to buy more?: Yes     Within the past 12 months, did the food you bought just not last and you didn t have money to get more?: No   Transportation Needs: Low Risk  (2/9/2024)    Transportation Needs     Within the past 12 months, has lack of transportation kept you from medical appointments, getting your medicines, non-medical meetings or appointments, work, or from getting things that you need?: No   Physical Activity: Not on file   Stress: Not on file   Social Connections: Not on file   Interpersonal Safety: Low Risk  (2/9/2024)    Interpersonal Safety     Do you feel physically and emotionally safe where you currently live?: Yes     Within the past 12 months, have you been hit, slapped, kicked or otherwise physically hurt by someone?: No     Within the past 12 months, have you been humiliated or emotionally abused in other ways by your partner or ex-partner?: No   Housing Stability: Low Risk  (2/9/2024)    Housing Stability     Do you have housing? : Yes     Are you worried about losing your housing?: No           Objective           Vitals:  No vitals were obtained today due to virtual visit.    Physical Exam   GENERAL: alert and no distress  EYES: Eyes grossly normal to inspection.  No discharge or erythema, or obvious scleral/conjunctival abnormalities.  RESP: No audible wheeze, cough, or visible cyanosis.    SKIN: Visible skin clear. No significant rash, abnormal pigmentation or lesions.  NEURO: Cranial nerves grossly intact.  Mentation and speech appropriate for age.  PSYCH: Appropriate  affect, tone, and pace of words          Video-Visit Details    Type of service:  Video Visit   Video End Time: 10:54 am  Originating Location (pt. Location): Home    Distant Location (provider location):  On-site  Platform used for Video Visit: Yao  Signed Electronically by: Kash Solano MD

## 2024-07-04 NOTE — PATIENT INSTRUCTIONS
"Recommendations from today's MTM visit:                                                       1. Please call [phone] to schedule your next rituximab infusion for July 2024.    2. I will talk to Dr. Mckinnon about placing a new orthopedic referral for you.    Follow-up: Return as needed.    It was great speaking with you today.  I value your experience and would be very thankful for your time in providing feedback in our clinic survey. In the next few days, you may receive an email or text message from [facility] [employer] with a link to a survey related to your  clinical pharmacist.\"     To schedule another MTM appointment, please call the clinic directly or you may call the MTM scheduling line at [phone].    My Clinical Pharmacist's contact information:                                                      Please feel free to contact me with any questions or concerns you have.      Priyanka Sheth, PharmD  Medication Therapy Management Pharmacist  [facility]  Phone: [phone]    "

## 2024-07-04 NOTE — PROGRESS NOTES
Medication Therapy Management (MTM) Encounter    ASSESSMENT:                            Patient prefers to only discuss rheumatology conditions and medications today. Will plan to assess additional medications and conditions at follow up.    Medication Adherence/Access: No issues identified    Vasculitis/GPA: Patient would benefit from moving up rituximab dose due to increased symptoms. Reviewed process to schedule an infusion appointment. Discussed recommendation to see orthopedics for knee pain. Will contact rheumatologist to get new orthopedic referral.    PLAN:                            1. Please call [phone] to schedule your next rituximab infusion for July 2024.    2. I will talk to Dr. Mckinnon about placing a new orthopedic referral for you.    -- New orthopedic referral placed 6/29/24.    Follow-up: Return as needed.    SUBJECTIVE/OBJECTIVE:                          Janine Cornell is a 59 year old female seen for an initial visit. She was referred to me from Majo Mckinnon MD.      Reason for visit: Rituximab coordination     Allergies/ADRs: Reviewed in chart  Past Medical History: Reviewed in chart  Tobacco: She reports that she has been smoking cigarettes. She started smoking about 9 years ago. She has a 0.2 pack-year smoking history. She has never used smokeless tobacco.  Alcohol: not currently using    Medication Adherence/Access: no issues reported    Vasculitis/GPA:   Rituximab infusions (Last dose: 2/28/24)  Pregabalin 25 mg daily    Reports she continues to have more joint pain - particularly in her knee and back. Notes she is also having headaches and nerve pain. Rheumatologist recommended moving up rituximab infusion to June 2024 (was due August 2024) - notes she was unaware of this recommendation. Rheumatologist also recommended seeing orthopedics for knee pain - reports she does not have a current orthopedic provider and needs a new referral.    Liver Function Studies -   Recent Labs   Lab  Test 06/05/24  1351 02/12/24  0856 12/14/23  1414   PROTTOTAL  --   --  7.3   ALBUMIN 4.9   < > 4.6   BILITOTAL  --   --  0.3   ALKPHOS  --   --  77   AST 24   < > 25   ALT 27   < > 22    < > = values in this interval not displayed.     CBC RESULTS:   Recent Labs   Lab Test 06/12/24  1758   WBC 14.5*   RBC 5.23*   HGB 13.3   HCT 40.5   MCV 77*   MCH 25.4*   MCHC 32.8   RDW 14.6        Today's Vitals: LMP  (LMP Unknown)   ----------------  Post Discharge Medication Reconciliation Status: discharge medications reconciled, continue medications without change.    I spent 15 minutes with this patient today. All changes were made via collaborative practice agreement with Majo Mckinnon. A copy of the visit note was provided to the patient's provider(s).    A summary of these recommendations was sent via My [employer].    Priyanka Sheth, PharmD  Medication Therapy Management Pharmacist  [facility]  Phone: [phone]    Telemedicine Visit Details  Type of service:  Telephone visit  Start Time: 2:30 PM  End Time: 2:45 PM     Medication Therapy Recommendations  No medication therapy recommendations to display

## 2024-07-08 RX ORDER — MULTIVITAMIN WITH FOLIC ACID 400 MCG
1 TABLET ORAL DAILY
Qty: 100 TABLET | Refills: 3 | Status: SHIPPED | OUTPATIENT
Start: 2024-07-08

## 2024-07-11 ENCOUNTER — HOSPITAL ENCOUNTER (OUTPATIENT)
Dept: MRI IMAGING | Facility: CLINIC | Age: 59
Discharge: HOME | End: 2024-07-11
Attending: FAMILY MEDICINE
Payer: COMMERCIAL

## 2024-07-11 DIAGNOSIS — R29.898 BILATERAL ARM WEAKNESS: ICD-10-CM

## 2024-07-11 DIAGNOSIS — M54.42 LOW BACK PAIN DUE TO BILATERAL SCIATICA: ICD-10-CM

## 2024-07-11 DIAGNOSIS — M54.2 CERVICALGIA: ICD-10-CM

## 2024-07-11 DIAGNOSIS — M54.41 LOW BACK PAIN DUE TO BILATERAL SCIATICA: ICD-10-CM

## 2024-07-11 DIAGNOSIS — M25.561 RIGHT KNEE PAIN, UNSPECIFIED CHRONICITY: ICD-10-CM

## 2024-07-11 DIAGNOSIS — M54.12 CERVICAL RADICULOPATHY: ICD-10-CM

## 2024-07-11 PROCEDURE — 72148 MRI LUMBAR SPINE W/O DYE: CPT

## 2024-07-11 PROCEDURE — 72146 MRI CHEST SPINE W/O DYE: CPT | Mod: 26 | Performed by: RADIOLOGY

## 2024-07-11 PROCEDURE — 73721 MRI JNT OF LWR EXTRE W/O DYE: CPT | Mod: RT

## 2024-07-11 PROCEDURE — 72141 MRI NECK SPINE W/O DYE: CPT

## 2024-07-11 PROCEDURE — 73721 MRI JNT OF LWR EXTRE W/O DYE: CPT | Mod: 26 | Performed by: RADIOLOGY

## 2024-07-11 PROCEDURE — 72148 MRI LUMBAR SPINE W/O DYE: CPT | Mod: 26 | Performed by: RADIOLOGY

## 2024-07-11 PROCEDURE — 72141 MRI NECK SPINE W/O DYE: CPT | Mod: 26 | Performed by: RADIOLOGY

## 2024-07-11 PROCEDURE — 72146 MRI CHEST SPINE W/O DYE: CPT

## 2024-07-12 ENCOUNTER — TELEPHONE (OUTPATIENT)
Dept: ORTHOPEDICS | Facility: CLINIC | Age: 59
End: 2024-07-12

## 2024-07-12 ENCOUNTER — TELEPHONE (OUTPATIENT)
Dept: INTERNAL MEDICINE | Facility: CLINIC | Age: 59
End: 2024-07-12

## 2024-07-12 DIAGNOSIS — M54.50 LUMBAGO: ICD-10-CM

## 2024-07-12 DIAGNOSIS — M48.00 SPINAL STENOSIS: ICD-10-CM

## 2024-07-12 DIAGNOSIS — M54.2 CERVICALGIA: Primary | ICD-10-CM

## 2024-07-12 NOTE — TELEPHONE ENCOUNTER
Left Voicemail (1st Attempt) for the patient to call back and schedule the following:    Appointment type: New  Provider: [facility]  Return date: next available

## 2024-07-12 NOTE — TELEPHONE ENCOUNTER
Left a detailed message to the pt regarding the results of MR right knee, cervical, thoracic, lumbar spine and recommendations. She will be contacted for the scheduling.    Soon-Mi  -----------------------------------------------------------------------------------------------          ----- Message from Kash Solano sent at 7/12/2024 10:36 AM CDT   We did total MR spine  I do not think her autoimmune disorder is causing pain or inflammation in the spine  But, there is a great deal of age related wear and tear in her spine that can cause pain and limited ROM    She should go to   Spine Clinic (needs referral)  Re: cervicalgia, lumbago, spinal stenosis    Can someone let her know and help set up appt?    Also she has referral to Sports Med but no appt for knee pain needs that too    I do not think she has care everywhere so someone should give her a call    Thx-if we can get these done I think it will help a lot.    She has pain and swelling in knee   Just did MR   Shows arthritis and torn cartilage, either or both could hurt   I gave her referral to Sports Med she was ok going.

## 2024-07-12 NOTE — TELEPHONE ENCOUNTER
----- Message from Katy CAMPBELL sent at 7/12/2024  8:49 AM CDT -----    ----- Message -----  From: Katy Ferrera RN  Sent: 7/12/2024   8:48 AM CDT  To: Evelin Morel ATC    Oh, I am so sorry. This is the pt. Thank you.  Katy  ----- Message -----  From: Evelin Morel ATC  Sent: 7/11/2024   4:27 PM CDT  To: Noy Boothe; GEORGIE Law,     Can you give us MRN so we can reach out to the patient and assist in scheduling them with a sports medicine provider?    Evelin  ----- Message -----  From: Noy Boothe  Sent: 7/11/2024   4:20 PM CDT  To: Evelin Morel ATC    Hi Evelin,    I'd be more than happy to schedule this patient. Who is it?    Thanks!    Noy  ----- Message -----  From: Evelin Morel ATC  Sent: 7/11/2024   4:11 PM CDT  To: [facility]-    Please assist in scheduilng next available with sports  ----- Message -----  From: Katy Ferrera RN  Sent: 7/11/2024   3:52 PM CDT  To: [facility]    Hi,    She has pain and swelling in knee.  Just did MR  Shows arthritis and torn cartilage, either or both could hurt  She had a referral to Sports Med, but nothing is set up.  Could you help with this? Thank you.    Katy Ferrera RN Care Coordinator  Primary Care Clinic  Phone [phone]  Fax     [phone]

## 2024-07-16 NOTE — TELEPHONE ENCOUNTER
Left Voicemail (2nd Attempt) for the patient to call back and schedule the following:    Appointment type: New  Provider: [facility]  Return date: next available

## 2024-07-17 ENCOUNTER — TELEPHONE (OUTPATIENT)
Dept: ORTHOPEDICS | Facility: CLINIC | Age: 59
End: 2024-07-17
Payer: COMMERCIAL

## 2024-07-17 NOTE — TELEPHONE ENCOUNTER
----- Message from Jas GONZALES sent at 7/17/2024 12:40 PM CDT -----  Spine could be scheduled with Oleg/Brittany/Roderick.    Please assist with both appointments.  Thank you,    Jas  ----- Message -----  From: Katy Ferrera RN  Sent: 7/17/2024  12:09 PM CDT  To: [facility],    This pt has a sports medicine referral for knee and orthopedic referral for spine, so she needs 2 separate appts. Could you help with this? Thank you.    Katy Ferrera RN Care Coordinator  Primary Care Clinic  Phone [phone]  Fax     [phone]

## 2024-07-17 NOTE — TELEPHONE ENCOUNTER
Left Voicemail (1st Attempt) for the patient to call back and schedule the following:    Appointment type: New MSK and New Lumbar spine  Provider: [facility] Roderick Son or Martin  Return date: Next available

## 2024-07-19 ENCOUNTER — TELEPHONE (OUTPATIENT)
Dept: ORTHOPEDICS | Facility: CLINIC | Age: 59
End: 2024-07-19
Payer: COMMERCIAL

## 2024-07-19 NOTE — TELEPHONE ENCOUNTER
Left Voicemail (2nd Attempt) for the patient to call back and schedule the following:    Appointment type: New  Provider: Any sports provider for knee. Dr Soto for spine  Return date: Next avail  Specialty phone number: [phone]

## 2024-07-31 DIAGNOSIS — M79.2 NEURALGIA: ICD-10-CM

## 2024-07-31 RX ORDER — PREGABALIN 25 MG/1
CAPSULE ORAL
Qty: 30 CAPSULE | Refills: 1 | Status: SHIPPED | OUTPATIENT
Start: 2024-07-31

## 2024-07-31 NOTE — TELEPHONE ENCOUNTER
PREGABALIN 25 MG CAPSULE       Last Written Prescription Date:  5-30-24  Last Fill Quantity: 30,   # refills: 1  Last Office Visit : 7-2-24  Future Office visit:  none    Routing refill request to provider for review/approval because:  Drug not on the [facility], [facility] or [facility] refill protocol or controlled substance

## 2024-08-20 ENCOUNTER — TELEPHONE (OUTPATIENT)
Dept: ENDOCRINOLOGY | Facility: CLINIC | Age: 59
End: 2024-08-20
Payer: COMMERCIAL

## 2024-08-20 NOTE — TELEPHONE ENCOUNTER
Called patient and left voicemail. Patient has an appointment on 8/22/24. Need to collect the last 14 days worth of blood sugar readings to prepare for patient's visit.     Elizabeth Polanco LPN 08/20/24 10:07 AM

## 2024-08-20 NOTE — PROGRESS NOTES
Outcome for 08/20/24 10:07 AM: Left Voicemail   Elizabeth Polanco LPN   Outcome for 08/21/24 9:13 AM: Left Voicemail   Elizabeth Polanco LPN   Outcome for 08/21/24 12:35 PM: Patient declined to share glucose readings  Elizabeth Polanco LPN

## 2024-08-21 NOTE — TELEPHONE ENCOUNTER
Called patient and left voicemail. Patient has an appointment on 8/22/24. Need to collect the last 14 days worth of blood sugar readings to prepare for patient's visit.     Elizabeth Polanco LPN 08/21/24 9:13 AM

## 2024-08-21 NOTE — TELEPHONE ENCOUNTER
Spoke with patient in regards to an upcoming appointment scheduled. Patient declined to verify identity and informed writer she will provide readings for provider at visit.    Elizabeth Polanco LPN 08/21/24 12:35 PM

## 2024-08-22 ENCOUNTER — VIRTUAL VISIT (OUTPATIENT)
Dept: ENDOCRINOLOGY | Facility: CLINIC | Age: 59
End: 2024-08-22
Payer: COMMERCIAL

## 2024-08-22 DIAGNOSIS — Z79.4 TYPE 2 DIABETES MELLITUS WITH OTHER CIRCULATORY COMPLICATION, WITH LONG-TERM CURRENT USE OF INSULIN (H): Primary | ICD-10-CM

## 2024-08-22 DIAGNOSIS — E11.59 TYPE 2 DIABETES MELLITUS WITH OTHER CIRCULATORY COMPLICATION, WITH LONG-TERM CURRENT USE OF INSULIN (H): Primary | ICD-10-CM

## 2024-08-22 PROCEDURE — 99214 OFFICE O/P EST MOD 30 MIN: CPT | Mod: 95 | Performed by: PHYSICIAN ASSISTANT

## 2024-08-22 NOTE — NURSING NOTE
Current patient location:  [place]    Is the patient currently in the state of [place]? YES    Visit mode:VIDEO    If the visit is dropped, the patient can be reconnected by: VIDEO VISIT: Text to cell phone:   Telephone Information:   Mobile [phone]       Will anyone else be joining the visit? NO  (If patient encounters technical issues they should call [phone] :[id number])    How would you like to obtain your AVS? MyChart    Are changes needed to the allergy or medication list? No    Are refills needed on medications prescribed by this physician? NO    Rooming Documentation:  Questionnaire(s) not done per department protocol      Reason for visit: Video Visit and RECHECK    Abigail GRAY

## 2024-08-22 NOTE — PROGRESS NOTES
Time of start: 11:35 am  Time of end: 11:47 am   Total duration of telephone visit:12 minutes.  Providers location: offsite.  Patients location: [place].    HPI  Janine Cornell is a 59 year old female with type 2 diabetes mellitus. Video visit for diabetes follow up today.   Pt last seen by Dr. [place] in May 2024.  Pt was dx having type 2 diabetes mellitus in 2010.  No hx of diabetic retinopathy, seen regularly for hx of gpa. No nephropathy. Sx of numbness, stiffness and pain in toes.  Pt has hx of  granulomatosis polyangiitis, fibromyalgia, NSTEM - CAD s/p stents, obesity, PCOS, chronic pancreatitis, HTN, hyperlipidemia, fatty liver, asthma, ischemic cardiomyopathy, COVID infection x 2 and anemia.  She has multiple allergies.  For her diabetes, she is taking Metformin 2000 mg daily, Jardiance 25 mg in am and Lantus 52 units subcutaneous each am.  Most recent A1C 8.4 % on 6/5/2024.    Previous A1C was 8.4 % in March 2024 and   9.5 % in June 2023.  I have no glucose meter download data at this time.  Janine tells me her 14-day average glucose is 123, 30-day average glucose 124 in 90-day average glucose 129.  She only checks her fasting blood sugar.  On ROS today,   Pain, numbness and stiffness in toes. No foot ulcers.  Some loose stools.    Denies dysuria.  No symptoms of vaginal yeast infection.    Diabetes Care  Retinopathy: no diabetic retinopathy. Hx of gpa and seen by [facility] on regular basis.  Nephropathy: none.Pt taking lisinopril/hydrochlorothiazide.  Neuropathy: not sure.  Foot Exam: no exam today.  Taking aspirin: no.  Lipids: LDL 82 in 12/2023. Pt taking Pravachol.  Insulin: Basal insulin.  DM meds: Metformin and Jardiance.   Testing: glucose meter and supplies ordered today.    ROS  See under HPI.    Allergies  Allergies   Allergen Reactions    Amoxicillin-Pot Clavulanate      Unknown possible hives and edema    Amoxicillin-Pot Clavulanate     Artificial Sweetner (Informational Only)  Other (See Comments)      Increased headache    Azithromycin     Clavulanic Acid     Diatrizoate Other (See Comments)     Pt wants this listed because she is allergic to shellfish     Imitrex [Triptans]      Severe face/neck/chest tightness and flushing side effects     Penicillins Hives     Unknown     Pork Allergy      Stomach pain, cramping, diarrhea, itching, nausea and headaches    Shellfish Allergy Hives and Swelling    Shellfish-Derived Products     Sulfa Antibiotics Hives and Swelling    Sulfatolamide     Zithromax [Azithromycin Dihydrate] Swelling     Unknown        Medications  Current Outpatient Medications   Medication Sig Dispense Refill    acetaminophen (TYLENOL) 325 MG tablet Take 1-2 tablets (325-650 mg) by mouth every 6 hours as needed for pain (headache) 250 tablet 4    ADVAIR DISKUS 250-50 MCG/ACT inhaler Inhale 1 puff into the lungs 2 times daily      albuterol (2.5 MG/3ML) 0.083% neb solution INL 1 VIAL VIA NEBULIZATION Q 4 TO 6 HOURS PRN  1    albuterol (PROAIR HFA, PROVENTIL HFA, VENTOLIN HFA) 108 (90 BASE) MCG/ACT inhaler Inhale 2 puffs into the lungs every 6 hours as needed for shortness of breath / dyspnea or wheezing 3 Inhaler 1    BANOPHEN 25 MG tablet Take 25 mg by mouth At Bedtime      blood glucose (NO BRAND SPECIFIED) lancets standard Use to test blood sugar 1-4 times daily or as directed. 400 each 3    blood glucose (NO BRAND SPECIFIED) test strip Use to test blood sugar fasting each am and predinner daily. 250 strip 3    blood glucose monitoring (NO BRAND SPECIFIED) meter device kit Use to test blood sugar 2 times daily or as directed. 1 kit 0    Blood Pressure Monitor KIT 1 each daily Monitor home blood pressure as instructed by physician.  Dispense [place] blood pressure kit. 1 kit 0    calcium carbonate (TUMS) 500 MG chewable tablet Take 3-4 tablets (1,500-2,000 mg) by mouth daily as needed 90 tablet 3    clopidogrel (PLAVIX) 75 MG tablet Take 1 tablet (75 mg) by mouth daily . 30 tablet 6    clotrimazole  (MYCELEX) 10 MG lozenge Place 1 lozenge (10 mg) inside cheek 5 times daily 50 lozenge 1    cyanocobalamin (VITAMIN B-12) 1000 MCG tablet Take 1 tablet by mouth daily at 2 pm      cyclobenzaprine (FLEXERIL) 10 MG tablet Take 1 tablet (10 mg) by mouth 2 times daily as needed for muscle spasms 60 tablet 3    cycloSPORINE (RESTASIS) 0.05 % ophthalmic emulsion 1 month supply 11 refills 1 each 11    dicyclomine (BENTYL) 20 MG tablet TAKE 1 TABLET(20 MG) BY MOUTH FOUR TIMES DAILY AS NEEDED. 60 tablet 4    empagliflozin (JARDIANCE) 25 MG TABS tablet Take 1 tablet (25 mg) by mouth daily 90 tablet 2    EPIPEN 2-RIKY 0.3 MG/0.3ML injection INJECT 0.3 MG INTO THE MUSCLE PRF ANAPHYALAXIS  0    esomeprazole (NEXIUM) 40 MG DR capsule Take 1 capsule (40 mg) by mouth 2 times daily Take 30-60 minutes before eating. 180 capsule 0    estradiol (VAGIFEM) 10 MCG TABS vaginal tablet Place 1 tablet (10 mcg) vaginally twice a week 24 tablet 3    fluticasone (FLONASE) 50 MCG/ACT nasal spray Spray 1 spray in nostril as needed      folic acid (FOLVITE) 1 MG tablet TAKE 1 TABLET BY MOUTH EVERY DAY 90 tablet 0    insulin glargine (LANTUS PEN) 100 UNIT/ML pen Inject 56 Units Subcutaneous every morning Or as directed. 75 mL 1    insulin pen needle (BD PEN NEEDLE MARCK 2ND GEN) 32G X 4 MM miscellaneous USE ONE PEN NEEDLE DAILY OR AS DIRECTED 100 each 2    lisinopril-hydrochlorothiazide (ZESTORETIC) 20-25 MG tablet Take 1 tablet by mouth daily 30 tablet 6    magnesium 250 MG tablet TAKE 1 TABLET(250 MG) BY MOUTH TWICE DAILY 60 tablet 3    Magnesium Oxide 250 MG tablet TAKE 1 TABLET(250 MG) BY MOUTH TWICE DAILY 60 tablet 3    metFORMIN (GLUCOPHAGE XR) 500 MG 24 hr tablet Take 4 tablets (2,000 mg) by mouth daily (with dinner) 360 tablet 3    metoprolol succinate ER (TOPROL XL) 100 MG 24 hr tablet Take 1 tablet (100 mg) by mouth daily 30 tablet 6    Multiple Vitamin (DAILY-GERALD MULTIVITAMIN) TABS TAKE 1 TABLET BY MOUTH EVERY  tablet 3     olopatadine (PATANOL) 0.1 % ophthalmic solution Place 1 drop into both eyes as needed      ondansetron (ZOFRAN ODT) 8 MG ODT tab TAKE 1 TABLET BY MOUTH EVERY 8 HOURS AS NEEDED FOR NAUSEA 20 tablet 1    pravastatin (PRAVACHOL) 40 MG tablet Take 1 tablet (40 mg) by mouth daily 30 tablet 6    pregabalin (LYRICA) 25 MG capsule TAKE 1 CAPSULE BY MOUTH EVERY DAY 30 capsule 1    sennosides (SENOKOT) 8.6 MG tablet 1-2 tabs a day as needed for constipation 60 tablet 1    spironolactone (ALDACTONE) 25 MG tablet Take 1 tablet (25 mg) by mouth daily. May also take 0.5 tablets (12.5 mg) daily as needed (for weight gain). 45 tablet 6    sucralfate (CARAFATE) 1 GM tablet Take 1 tablet (1 g) by mouth 4 times daily 120 tablet 3    traMADol (ULTRAM) 50 MG tablet TAKE 1 TABLET(50 MG) BY MOUTH EVERY 8 HOURS AS NEEDED FOR SEVERE PAIN 60 tablet 3       Family History  family history includes Anesthesia Reaction in her son; Arthritis in her brother, maternal uncle, and mother; C.A.D. in her brother, father, and mother; Cerebrovascular Disease in her father and maternal uncle; Depression in her father; Diabetes in her father and maternal uncle; Eye Disorder in her maternal uncle; Family History Negative in an other family member; Heart Disease in her mother; Heart Disease (age of onset: 15) in her brother; Heart Disease (age of onset: 50) in her father; Hypertension in her father, maternal aunt, maternal uncle, and mother; Neurologic Disorder in her sister and sister; Respiratory in her son; Thyroid Disease in her mother.    Social History   reports that she has been smoking cigarettes. She started smoking about 9 years ago. She has a 0.2 pack-year smoking history. She has never used smokeless tobacco. She reports that she does not drink alcohol and does not use drugs.     Past Medical History  Past Medical History:   Diagnosis Date    Abnormal glandular Papanicolaou smear of cervix 1992    Allergic rhinitis     Allergy, airborne subst     Arthritis     ASCVD (arteriosclerotic cardiovascular disease)     Chronic pain     Chronic pancreatitis (H)     idiopathic, Tx: PPI, antioxidants, pancreatic enzymes    Common migraine     Coronary artery disease     Costochondritis     Difficulty in walking(719.7)     Dyspnea on exertion     Ectasia, mammary duct     [facility] n[facility] discharge    Elevated fasting glucose     Gastro-oesophageal reflux disease     Granulomatosis with polyangiitis (H)     Hernia     History of angina     Hyperlipidemia LDL goal < 100     Hypertension goal BP (blood pressure) < 140/90     Essential hypertension    Iron deficiency anemia     Ischemic cardiomyopathy     Menorrhagia     Migraine headaches     Mild persistent asthma     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 Matias, neg w/u, Rx'd w/prednisone    Ventral hernia, unspecified, without mention of obstruction or gangrene 2012       Past Surgical History:   Procedure Laterality Date    C/SECTION, LOW TRANSVERSE  1996        CARDIAC SURGERY      cardiac stent- recent in 16; 4 other stents    COLONOSCOPY N/A 2023    Procedure: COLONOSCOPY, WITH POLYPECTOMY;  Surgeon: Percy Gross MD;  Location: [facility] OR    DILATION AND CURETTAGE N/A 2016    Procedure: DILATION AND  CURETTAGE;  Surgeon: Nahed Butler MD;  [facility]    ENDOMETRIAL SAMPLING (BIOPSY) N/A 4/28/2023    Procedure: ENDOMETRIAL BIOPSY;  Surgeon: Nahed Butler MD;  [facility]    ESOPHAGOSCOPY, GASTROSCOPY, DUODENOSCOPY (EGD), COMBINED N/A 03/31/2022    Procedure: ESOPHAGOGASTRODUODENOSCOPY, WITH BIOPSY;  Surgeon: Enzo Sesay MD;  Location: [facility]    ESOPHAGOSCOPY, GASTROSCOPY, DUODENOSCOPY (EGD), COMBINED N/A 5/25/2023    Procedure: ESOPHAGOGASTRODUODENOSCOPY, WITH BIOPSY;  Surgeon: Percy Gross MD;  Location: [facility] OR    EXAM UNDER ANESTHESIA PELVIC N/A 4/28/2023    Procedure: EXAM UNDER ANESTHESIA;  Surgeon: Nahed Butler MD;  [facility]    HC UGI ENDOSCOPY W EUS  11/07/2008    Dr. Pastrana, possible chronic pancreatitis, fatty liver    HERNIA REPAIR  12/01/2012    done at [facility] [place]    INSERT INTRAUTERINE DEVICE N/A 07/06/2016    Procedure: INSERT INTRAUTERINE DEVICE;  Surgeon: Nahed Butler MD;  [facility]    INT UTERINE FIBRIOD EMBOLIZATION  10/29/2014    LAPAROSCOPIC CHOLECYSTECTOMY  05/28/2008    Dr. Arnol GRUBBS TX, CERVICAL  1992    s/p LEEP, done at [facility] in [place].    ORBITOTOMY Right 03/15/2016    Procedure: ORBITOTOMY;  Surgeon: Myron Cyr MD;  Location: [facility]    ORBITOTOMY Right 08/04/2017    Procedure: ORBITOTOMY;  RIGHT ORBITOTOMY AND BIOPSY;  Surgeon: Charis Holbrook MD;  Location: [facility]    REMOVE INTRAUTERINE DEVICE N/A 4/28/2023    Procedure: Remove intrauterine device,;  Surgeon: Nahed Butler MD;  [facility]    REPAIR PTOSIS Right 11/17/2017    Procedure: REPAIR PTOSIS;  RIGHT UPPER LID PTOSIS REPAIR;  Surgeon: Myron Cyr MD;  [facility]    UPPER GI ENDOSCOPY  10/21/2008    mild gastritis, Dr. Rocky [facility]A ECHO HEART XTHORACIC,COMPLETE, W/O DOPPLER  02/04/2004    [facility] WNL, EF 60%       Physical Exam    No exam today.      RESULTS  Creatinine   Date Value Ref Range Status    06/12/2024 1.15 (H) 0.51 - 0.95 mg/dL Final   05/28/2021 1.00 0.52 - 1.04 mg/dL Final     GFR Estimate   Date Value Ref Range Status   06/12/2024 55 (L) >60 mL/min/1.73m2 Final   05/28/2021 64 >60 mL/min/[1.73_m2] Final     Comment:     Non  GFR Calc  Starting 12/18/2018, serum creatinine based estimated GFR (eGFR) will be   calculated using the Chronic Kidney Disease Epidemiology Collaboration   (CKD-EPI) equation.       Hemoglobin A1C   Date Value Ref Range Status   06/05/2024 8.4 (H) <5.7 % Final     Comment:     Normal <5.7%   Prediabetes 5.7-6.4%    Diabetes 6.5% or higher     Note: Adopted from ADA consensus guidelines.   06/18/2020 8.0 (H) 0 - 5.6 % Final     Comment:     Normal <5.7% Prediabetes 5.7-6.4%  Diabetes 6.5% or higher - adopted from ADA   consensus guidelines.       Potassium   Date Value Ref Range Status   06/12/2024 3.4 3.4 - 5.3 mmol/L Final   07/08/2022 3.9 3.4 - 5.3 mmol/L Final   05/28/2021 3.5 3.4 - 5.3 mmol/L Final     ALT   Date Value Ref Range Status   06/05/2024 27 0 - 50 U/L Final   05/28/2021 28 0 - 50 U/L Final     AST   Date Value Ref Range Status   06/05/2024 24 0 - 45 U/L Final   05/28/2021 20 0 - 45 U/L Final     TSH   Date Value Ref Range Status   01/19/2023 0.40 0.30 - 4.20 uIU/mL Final   05/14/2022 1.48 0.40 - 4.00 mU/L Final   03/06/2019 0.25 (L) 0.40 - 4.00 mU/L Final     T4 Free   Date Value Ref Range Status   03/06/2019 1.00 0.76 - 1.46 ng/dL Final     Free T4   Date Value Ref Range Status   02/04/2022 1.27 0.76 - 1.46 ng/dL Final       Cholesterol   Date Value Ref Range Status   12/14/2023 145 <200 mg/dL Final   06/29/2023 161 <200 mg/dL Final   06/18/2020 102 <200 mg/dL Final   07/11/2019 132 <200 mg/dL Final     HDL Cholesterol   Date Value Ref Range Status   06/18/2020 30 (L) >49 mg/dL Final   07/11/2019 40 (L) >49 mg/dL Final     Direct Measure HDL   Date Value Ref Range Status   12/14/2023 36 (L) >=50 mg/dL Final   06/29/2023 42 (L) >=50 mg/dL Final      LDL Cholesterol Calculated   Date Value Ref Range Status   12/14/2023 82 <=100 mg/dL Final   06/29/2023 89 <=100 mg/dL Final   06/18/2020 50 <100 mg/dL Final     Comment:     Desirable:       <100 mg/dl   07/11/2019 62 <100 mg/dL Final     Comment:     Desirable:       <100 mg/dl     LDL Cholesterol Direct   Date Value Ref Range Status   12/14/2023 94 <100 mg/dL Final     Comment:     Age 2-19 years:  Desirable: 0-110 mg/dL   Borderline high: 110-129 mg/dL   High: >= 130 mg/dL    Age 20 years and older:  Desirable: <100mg/dL  Above desirable: 100-129 mg/dL   Borderline high: 130-159 mg/dL   High: 160-189 mg/dL   Very high: >= 190 mg/dL   06/29/2023 97 <100 mg/dL Final     Comment:     Age 2-19 years:  Desirable: 0-110 mg/dL   Borderline high: 110-129 mg/dL   High: >= 130 mg/dL    Age 20 years and older:  Desirable: <100mg/dL  Above desirable: 100-129 mg/dL   Borderline high: 130-159 mg/dL   High: 160-189 mg/dL   Very high: >= 190 mg/dL     Triglycerides   Date Value Ref Range Status   12/14/2023 134 <150 mg/dL Final   06/29/2023 148 <150 mg/dL Final   06/18/2020 104 <150 mg/dL Final   07/11/2019 149 <150 mg/dL Final     Cholesterol/HDL Ratio   Date Value Ref Range Status   07/29/2015 3.5 0.0 - 5.0 Final   02/04/2015 3.1 0.0 - 5.0 Final         ASSESSMENT/PLAN:      TYPE 2 DIABETES MELLITUS: Fasting blood sugar values are good per history.  I have no postprandial blood sugar values to review today.  I have asked patient to check her FBS and predinner blood sugar. She is not interested in using a Freestyle Abigail sensor.  Continue Jardiance 25 mg each am, Metformin 2000 mg daily and Lantus insulin 52 units each am.  Avoid use of GLP-1 drugs given her hx of chronic pancreatitis. No hx of diabetic retinopathy per patient. No hx of nephropathy.  Most recent creat 1.15 with GFR 55 mL/min in June 2024. She reports pain, numbness and stiffness in her toes.   HX OF CHRONIC PANCREATITIS: Avoid GLP-1 drugs as  above.  OBESITY: She declines to meet with RD or CDE.   FOOT PAIN: Denies foot ulcers.  FOLLOW UP: With Dr. [place] in 4 months.  AC ordered today.    Time spent reviewing chart and labs today = 5 minutes.  Time for telephone visit today = 12  minutes.  Time for documentation today = 10 minutes.    Total time for visit today= 27 minutes    Krissy Danielle PA-C

## 2024-08-22 NOTE — LETTER
8/22/2024       RE: Janine Cornell  [address]  [facility] [address]     Dear Colleague,    Thank you for referring your patient, Janien Cornell, to the [facility] ENDOCRINOLOGY [facility] [place] at [facility]. Please see a copy of my visit note below.    Outcome for 08/20/24 10:07 AM: Left Voicemail   Elizabeth Polanco LPN   Outcome for 08/21/24 9:13 AM: Left Voicemail   Elizabeth Polanco LPN   Outcome for 08/21/24 12:35 PM: Patient declined to share glucose readings  Elizabeth Polanco LPN             Time of start: 11:35 am  Time of end: 11:47 am   Total duration of telephone visit:12 minutes.  Providers location: offsite.  Patients location: [place].    [facility]  Janine Cornell is a 59 year old female with type 2 diabetes mellitus. Video visit for diabetes follow up today.   Pt last seen by Dr. Norman in May 2024.  Pt was dx having type 2 diabetes mellitus in 2010.  No hx of diabetic retinopathy, seen regularly for hx of gpa. No nephropathy. Sx of numbness, stiffness and pain in toes.  Pt has hx of  granulomatosis polyangiitis, fibromyalgia, NSTEM - CAD s/p stents, obesity, PCOS, chronic pancreatitis, HTN, hyperlipidemia, fatty liver, asthma, ischemic cardiomyopathy, COVID infection x 2 and anemia.  She has multiple allergies.  For her diabetes, she is taking Metformin 2000 mg daily, Jardiance 25 mg in am and Lantus 52 units subcutaneous each am.  Most recent A1C 8.4 % on 6/5/2024.    Previous A1C was 8.4 % in March 2024 and   9.5 % in June 2023.  I have no glucose meter download data at this time.  Janine tells me her 14-day average glucose is 123, 30-day average glucose 124 in 90-day average glucose 129.  She only checks her fasting blood sugar.  On ROS today,   Pain, numbness and stiffness in toes. No foot ulcers.  Some loose stools.    Denies dysuria.  No symptoms of vaginal yeast infection.    Diabetes Care  Retinopathy: no diabetic retinopathy. Hx of gpa and seen by [facility] on  regular basis.  Nephropathy: none.Pt taking lisinopril/hydrochlorothiazide.  Neuropathy: not sure.  Foot Exam: no exam today.  Taking aspirin: no.  Lipids: LDL 82 in 12/2023. Pt taking Pravachol.  Insulin: Basal insulin.  DM meds: Metformin and Jardiance.   Testing: glucose meter and supplies ordered today.    ROS  See under HPI.    Allergies  Allergies   Allergen Reactions     Amoxicillin-Pot Clavulanate      Unknown possible hives and edema     Amoxicillin-Pot Clavulanate      Artificial Sweetner (Informational Only)  Other (See Comments)     Increased headache     Azithromycin      Clavulanic Acid      Diatrizoate Other (See Comments)     Pt wants this listed because she is allergic to shellfish      Imitrex [Triptans]      Severe face/neck/chest tightness and flushing side effects      Penicillins Hives     Unknown      Pork Allergy      Stomach pain, cramping, diarrhea, itching, nausea and headaches     Shellfish Allergy Hives and Swelling     Shellfish-Derived Products      Sulfa Antibiotics Hives and Swelling     Sulfatolamide      Zithromax [Azithromycin Dihydrate] Swelling     Unknown        Medications  Current Outpatient Medications   Medication Sig Dispense Refill     acetaminophen (TYLENOL) 325 MG tablet Take 1-2 tablets (325-650 mg) by mouth every 6 hours as needed for pain (headache) 250 tablet 4     ADVAIR DISKUS 250-50 MCG/ACT inhaler Inhale 1 puff into the lungs 2 times daily       albuterol (2.5 MG/3ML) 0.083% neb solution INL 1 VIAL VIA NEBULIZATION Q 4 TO 6 HOURS PRN  1     albuterol (PROAIR HFA, PROVENTIL HFA, VENTOLIN HFA) 108 (90 BASE) MCG/ACT inhaler Inhale 2 puffs into the lungs every 6 hours as needed for shortness of breath / dyspnea or wheezing 3 Inhaler 1     BANOPHEN 25 MG tablet Take 25 mg by mouth At Bedtime       blood glucose (NO BRAND SPECIFIED) lancets standard Use to test blood sugar 1-4 times daily or as directed. 400 each 3     blood glucose (NO BRAND SPECIFIED) test strip Use  to test blood sugar fasting each am and predinner daily. 250 strip 3     blood glucose monitoring (NO BRAND SPECIFIED) meter device kit Use to test blood sugar 2 times daily or as directed. 1 kit 0     Blood Pressure Monitor KIT 1 each daily Monitor home blood pressure as instructed by physician.  Dispense [place] blood pressure kit. 1 kit 0     calcium carbonate (TUMS) 500 MG chewable tablet Take 3-4 tablets (1,500-2,000 mg) by mouth daily as needed 90 tablet 3     clopidogrel (PLAVIX) 75 MG tablet Take 1 tablet (75 mg) by mouth daily . 30 tablet 6     clotrimazole (MYCELEX) 10 MG lozenge Place 1 lozenge (10 mg) inside cheek 5 times daily 50 lozenge 1     cyanocobalamin (VITAMIN B-12) 1000 MCG tablet Take 1 tablet by mouth daily at 2 pm       cyclobenzaprine (FLEXERIL) 10 MG tablet Take 1 tablet (10 mg) by mouth 2 times daily as needed for muscle spasms 60 tablet 3     cycloSPORINE (RESTASIS) 0.05 % ophthalmic emulsion 1 month supply 11 refills 1 each 11     dicyclomine (BENTYL) 20 MG tablet TAKE 1 TABLET(20 MG) BY MOUTH FOUR TIMES DAILY AS NEEDED. 60 tablet 4     empagliflozin (JARDIANCE) 25 MG TABS tablet Take 1 tablet (25 mg) by mouth daily 90 tablet 2     EPIPEN 2-RIKY 0.3 MG/0.3ML injection INJECT 0.3 MG INTO THE MUSCLE PRF ANAPHYALAXIS  0     esomeprazole (NEXIUM) 40 MG DR capsule Take 1 capsule (40 mg) by mouth 2 times daily Take 30-60 minutes before eating. 180 capsule 0     estradiol (VAGIFEM) 10 MCG TABS vaginal tablet Place 1 tablet (10 mcg) vaginally twice a week 24 tablet 3     fluticasone (FLONASE) 50 MCG/ACT nasal spray Spray 1 spray in nostril as needed       folic acid (FOLVITE) 1 MG tablet TAKE 1 TABLET BY MOUTH EVERY DAY 90 tablet 0     insulin glargine (LANTUS PEN) 100 UNIT/ML pen Inject 56 Units Subcutaneous every morning Or as directed. 75 mL 1     insulin pen needle (BD PEN NEEDLE MARCK 2ND GEN) 32G X 4 MM miscellaneous USE ONE PEN NEEDLE DAILY OR AS DIRECTED 100 each 2      lisinopril-hydrochlorothiazide (ZESTORETIC) 20-25 MG tablet Take 1 tablet by mouth daily 30 tablet 6     magnesium 250 MG tablet TAKE 1 TABLET(250 MG) BY MOUTH TWICE DAILY 60 tablet 3     Magnesium Oxide 250 MG tablet TAKE 1 TABLET(250 MG) BY MOUTH TWICE DAILY 60 tablet 3     metFORMIN (GLUCOPHAGE XR) 500 MG 24 hr tablet Take 4 tablets (2,000 mg) by mouth daily (with dinner) 360 tablet 3     metoprolol succinate ER (TOPROL XL) 100 MG 24 hr tablet Take 1 tablet (100 mg) by mouth daily 30 tablet 6     Multiple Vitamin (DAILY-GERALD MULTIVITAMIN) TABS TAKE 1 TABLET BY MOUTH EVERY  tablet 3     olopatadine (PATANOL) 0.1 % ophthalmic solution Place 1 drop into both eyes as needed       ondansetron (ZOFRAN ODT) 8 MG ODT tab TAKE 1 TABLET BY MOUTH EVERY 8 HOURS AS NEEDED FOR NAUSEA 20 tablet 1     pravastatin (PRAVACHOL) 40 MG tablet Take 1 tablet (40 mg) by mouth daily 30 tablet 6     pregabalin (LYRICA) 25 MG capsule TAKE 1 CAPSULE BY MOUTH EVERY DAY 30 capsule 1     sennosides (SENOKOT) 8.6 MG tablet 1-2 tabs a day as needed for constipation 60 tablet 1     spironolactone (ALDACTONE) 25 MG tablet Take 1 tablet (25 mg) by mouth daily. May also take 0.5 tablets (12.5 mg) daily as needed (for weight gain). 45 tablet 6     sucralfate (CARAFATE) 1 GM tablet Take 1 tablet (1 g) by mouth 4 times daily 120 tablet 3     traMADol (ULTRAM) 50 MG tablet TAKE 1 TABLET(50 MG) BY MOUTH EVERY 8 HOURS AS NEEDED FOR SEVERE PAIN 60 tablet 3       Family History  family history includes Anesthesia Reaction in her son; Arthritis in her brother, maternal uncle, and mother; C.A.D. in her brother, father, and mother; Cerebrovascular Disease in her father and maternal uncle; Depression in her father; Diabetes in her father and maternal uncle; Eye Disorder in her maternal uncle; Family History Negative in an other family member; Heart Disease in her mother; Heart Disease (age of onset: 15) in her brother; Heart Disease (age of onset: 50) in  her father; Hypertension in her father, maternal aunt, maternal uncle, and mother; Neurologic Disorder in her sister and sister; Respiratory in her son; Thyroid Disease in her mother.    Social History   reports that she has been smoking cigarettes. She started smoking about 9 years ago. She has a 0.2 pack-year smoking history. She has never used smokeless tobacco. She reports that she does not drink alcohol and does not use drugs.     Past Medical History  Past Medical History:   Diagnosis Date     Abnormal glandular Papanicolaou smear of cervix 1992     Allergic rhinitis     Allergy, airborne subst     Arthritis      ASCVD (arteriosclerotic cardiovascular disease)      Chronic pain      Chronic pancreatitis (H)     idiopathic, Tx: PPI, antioxidants, pancreatic enzymes     Common migraine      Coronary artery disease      Costochondritis      Difficulty in walking(719.7)      Dyspnea on exertion      Ectasia, mammary duct     [facility], [facility] n[facility] discharge     Elevated fasting glucose      Gastro-oesophageal reflux disease      Granulomatosis with polyangiitis (H)      Hernia      History of angina      Hyperlipidemia LDL goal < 100      Hypertension goal BP (blood pressure) < 140/90     Essential hypertension     Iron deficiency anemia      Ischemic cardiomyopathy      Menorrhagia      Migraine headaches      Mild persistent asthma      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 Matias, neg w/u, Rx'd w/prednisone     Ventral hernia, unspecified, without mention of obstruction or gangrene 2012       Past Surgical History:   Procedure Laterality Date     C/SECTION, LOW TRANSVERSE  1996         CARDIAC SURGERY      cardiac stent- recent in 16; 4 other stents     COLONOSCOPY N/A 2023    Procedure: COLONOSCOPY, WITH POLYPECTOMY;  Surgeon: Percy Gross MD;  Location: [facility] OR     DILATION AND CURETTAGE N/A 2016    Procedure: DILATION AND CURETTAGE;  Surgeon: Nahed Butler MD;  [facility]     ENDOMETRIAL SAMPLING (BIOPSY) N/A 2023    Procedure: ENDOMETRIAL BIOPSY;  Surgeon: Nahed Butler MD;  [facility]     ESOPHAGOSCOPY, GASTROSCOPY, DUODENOSCOPY (EGD), COMBINED N/A 2022    Procedure: ESOPHAGOGASTRODUODENOSCOPY, WITH BIOPSY;  Surgeon: Enzo Sesay MD;  Location: [facility]     ESOPHAGOSCOPY, GASTROSCOPY, DUODENOSCOPY (EGD), COMBINED N/A 2023    Procedure: ESOPHAGOGASTRODUODENOSCOPY, WITH BIOPSY;  Surgeon: Percy Gross MD;  Location: [facility] OR     EXAM UNDER ANESTHESIA PELVIC N/A 2023    Procedure: EXAM UNDER ANESTHESIA;  Surgeon: Nahed Butler MD;  [facility]     HC UGI ENDOSCOPY W EUS  2008    Dr. Pastrana, possible chronic pancreatitis, fatty liver     HERNIA REPAIR  2012    done at [facility]     INSERT INTRAUTERINE DEVICE N/A 2016    Procedure: INSERT INTRAUTERINE DEVICE;  Surgeon: Nahed Butler MD;  [facility]     INT UTERINE FIBRIOD EMBOLIZATION  10/29/2014     LAPAROSCOPIC CHOLECYSTECTOMY  2008    Dr. Arnol GRUBBS TX, CERVICAL  1992    s/p LEERAHUL, done at [facility] in [place].     ORBITOTOMY Right 03/15/2016    Procedure: ORBITOTOMY;  Surgeon: Myron Cyr MD;  Location: [facility]      ORBITOTOMY Right 08/04/2017    Procedure: ORBITOTOMY;  RIGHT ORBITOTOMY AND BIOPSY;  Surgeon: Charis Holbrook MD;  Location: [facility]     REMOVE INTRAUTERINE DEVICE N/A 4/28/2023    Procedure: Remove intrauterine device,;  Surgeon: Nahed Butler MD;  [facility]     REPAIR PTOSIS Right 11/17/2017    Procedure: REPAIR PTOSIS;  RIGHT UPPER LID PTOSIS REPAIR;  Surgeon: Myron Cyr MD;  Location: [facility]     UPPER GI ENDOSCOPY  10/21/2008    mild gastritis, Dr. Rocky CALDERA ECHO HEART XTHORACIC,COMPLETE, W/O DOPPLER  02/04/2004    [facility] WNL, EF 60%       Physical Exam    No exam today.      RESULTS  Creatinine   Date Value Ref Range Status   06/12/2024 1.15 (H) 0.51 - 0.95 mg/dL Final   05/28/2021 1.00 0.52 - 1.04 mg/dL Final     GFR Estimate   Date Value Ref Range Status   06/12/2024 55 (L) >60 mL/min/1.73m2 Final   05/28/2021 64 >60 mL/min/[1.73_m2] Final     Comment:     Non  GFR Calc  Starting 12/18/2018, serum creatinine based estimated GFR (eGFR) will be   calculated using the Chronic Kidney Disease Epidemiology Collaboration   (CKD-EPI) equation.       Hemoglobin A1C   Date Value Ref Range Status   06/05/2024 8.4 (H) <5.7 % Final     Comment:     Normal <5.7%   Prediabetes 5.7-6.4%    Diabetes 6.5% or higher     Note: Adopted from ADA consensus guidelines.   06/18/2020 8.0 (H) 0 - 5.6 % Final     Comment:     Normal <5.7% Prediabetes 5.7-6.4%  Diabetes 6.5% or higher - adopted from ADA   consensus guidelines.       Potassium   Date Value Ref Range Status   06/12/2024 3.4 3.4 - 5.3 mmol/L Final   07/08/2022 3.9 3.4 - 5.3 mmol/L Final   05/28/2021 3.5 3.4 - 5.3 mmol/L Final     ALT   Date Value Ref Range Status   06/05/2024 27 0 - 50 U/L Final   05/28/2021 28 0 - 50 U/L Final     AST   Date Value Ref Range Status   06/05/2024 24 0 - 45 U/L Final   05/28/2021 20 0 - 45 U/L Final     TSH   Date Value Ref Range Status   01/19/2023 0.40 0.30 - 4.20 uIU/mL Final    05/14/2022 1.48 0.40 - 4.00 mU/L Final   03/06/2019 0.25 (L) 0.40 - 4.00 mU/L Final     T4 Free   Date Value Ref Range Status   03/06/2019 1.00 0.76 - 1.46 ng/dL Final     Free T4   Date Value Ref Range Status   02/04/2022 1.27 0.76 - 1.46 ng/dL Final       Cholesterol   Date Value Ref Range Status   12/14/2023 145 <200 mg/dL Final   06/29/2023 161 <200 mg/dL Final   06/18/2020 102 <200 mg/dL Final   07/11/2019 132 <200 mg/dL Final     HDL Cholesterol   Date Value Ref Range Status   06/18/2020 30 (L) >49 mg/dL Final   07/11/2019 40 (L) >49 mg/dL Final     Direct Measure HDL   Date Value Ref Range Status   12/14/2023 36 (L) >=50 mg/dL Final   06/29/2023 42 (L) >=50 mg/dL Final     LDL Cholesterol Calculated   Date Value Ref Range Status   12/14/2023 82 <=100 mg/dL Final   06/29/2023 89 <=100 mg/dL Final   06/18/2020 50 <100 mg/dL Final     Comment:     Desirable:       <100 mg/dl   07/11/2019 62 <100 mg/dL Final     Comment:     Desirable:       <100 mg/dl     LDL Cholesterol Direct   Date Value Ref Range Status   12/14/2023 94 <100 mg/dL Final     Comment:     Age 2-19 years:  Desirable: 0-110 mg/dL   Borderline high: 110-129 mg/dL   High: >= 130 mg/dL    Age 20 years and older:  Desirable: <100mg/dL  Above desirable: 100-129 mg/dL   Borderline high: 130-159 mg/dL   High: 160-189 mg/dL   Very high: >= 190 mg/dL   06/29/2023 97 <100 mg/dL Final     Comment:     Age 2-19 years:  Desirable: 0-110 mg/dL   Borderline high: 110-129 mg/dL   High: >= 130 mg/dL    Age 20 years and older:  Desirable: <100mg/dL  Above desirable: 100-129 mg/dL   Borderline high: 130-159 mg/dL   High: 160-189 mg/dL   Very high: >= 190 mg/dL     Triglycerides   Date Value Ref Range Status   12/14/2023 134 <150 mg/dL Final   06/29/2023 148 <150 mg/dL Final   06/18/2020 104 <150 mg/dL Final   07/11/2019 149 <150 mg/dL Final     Cholesterol/HDL Ratio   Date Value Ref Range Status   07/29/2015 3.5 0.0 - 5.0 Final   02/04/2015 3.1 0.0 - 5.0 Final          ASSESSMENT/PLAN:      TYPE 2 DIABETES MELLITUS: Fasting blood sugar values are good per history.  I have no postprandial blood sugar values to review today.  I have asked patient to check her FBS and predinner blood sugar. She is not interested in using a Freestyle Abigail sensor.  Continue Jardiance 25 mg each am, Metformin 2000 mg daily and Lantus insulin 52 units each am.  Avoid use of GLP-1 drugs given her hx of chronic pancreatitis. No hx of diabetic retinopathy per patient. No hx of nephropathy.  Most recent creat 1.15 with GFR 55 mL/min in June 2024. She reports pain, numbness and stiffness in her toes.   HX OF CHRONIC PANCREATITIS: Avoid GLP-1 drugs as above.  OBESITY: She declines to meet with RD or CDE.   FOOT PAIN: Denies foot ulcers.  FOLLOW UP: With Dr. Norman in 4 months.  AC ordered today.    Time spent reviewing chart and labs today = 5 minutes.  Time for telephone visit today = 12  minutes.  Time for documentation today = 10 minutes.    Total time for visit today= 27 minutes    Krissy Danielle PA-C      Again, thank you for allowing me to participate in the care of your patient.      Sincerely,    Krissy Danielle PA-C

## 2024-09-06 ENCOUNTER — INFUSION THERAPY VISIT (OUTPATIENT)
Dept: INFUSION THERAPY | Facility: CLINIC | Age: 59
End: 2024-09-06
Attending: INTERNAL MEDICINE
Payer: COMMERCIAL

## 2024-09-06 ENCOUNTER — TELEPHONE (OUTPATIENT)
Dept: RHEUMATOLOGY | Facility: CLINIC | Age: 59
End: 2024-09-06
Payer: COMMERCIAL

## 2024-09-06 VITALS
HEIGHT: 63 IN | BODY MASS INDEX: 29.45 KG/M2 | SYSTOLIC BLOOD PRESSURE: 155 MMHG | HEART RATE: 81 BPM | RESPIRATION RATE: 18 BRPM | WEIGHT: 166.2 LBS | DIASTOLIC BLOOD PRESSURE: 96 MMHG

## 2024-09-06 DIAGNOSIS — I77.82 ANCA-ASSOCIATED VASCULITIS (H): ICD-10-CM

## 2024-09-06 DIAGNOSIS — M31.30 GRANULOMATOSIS WITH POLYANGIITIS WITHOUT RENAL INVOLVEMENT (H): Primary | ICD-10-CM

## 2024-09-06 PROCEDURE — 258N000003 HC RX IP 258 OP 636: Performed by: INTERNAL MEDICINE

## 2024-09-06 PROCEDURE — 250N000013 HC RX MED GY IP 250 OP 250 PS 637: Performed by: INTERNAL MEDICINE

## 2024-09-06 PROCEDURE — 96367 TX/PROPH/DG ADDL SEQ IV INF: CPT

## 2024-09-06 PROCEDURE — 999N000040 HC STATISTIC CONSULT NO CHARGE VASC ACCESS

## 2024-09-06 PROCEDURE — 250N000011 HC RX IP 250 OP 636: Performed by: INTERNAL MEDICINE

## 2024-09-06 PROCEDURE — 96415 CHEMO IV INFUSION ADDL HR: CPT

## 2024-09-06 PROCEDURE — 96375 TX/PRO/DX INJ NEW DRUG ADDON: CPT

## 2024-09-06 PROCEDURE — 999N000128 HC STATISTIC PERIPHERAL IV START W/O US GUIDANCE

## 2024-09-06 PROCEDURE — 96413 CHEMO IV INFUSION 1 HR: CPT

## 2024-09-06 RX ORDER — METHYLPREDNISOLONE SODIUM SUCCINATE 125 MG/2ML
125 INJECTION, POWDER, LYOPHILIZED, FOR SOLUTION INTRAMUSCULAR; INTRAVENOUS
Status: CANCELLED
Start: 2024-09-06

## 2024-09-06 RX ORDER — ACETAMINOPHEN 325 MG/1
650 TABLET ORAL ONCE
Status: CANCELLED
Start: 2024-09-06

## 2024-09-06 RX ORDER — MEPERIDINE HYDROCHLORIDE 25 MG/ML
25 INJECTION INTRAMUSCULAR; INTRAVENOUS; SUBCUTANEOUS EVERY 30 MIN PRN
Status: CANCELLED | OUTPATIENT
Start: 2024-09-06

## 2024-09-06 RX ORDER — ACETAMINOPHEN 325 MG/1
650 TABLET ORAL ONCE
Status: COMPLETED | OUTPATIENT
Start: 2024-09-06 | End: 2024-09-06

## 2024-09-06 RX ORDER — METHYLPREDNISOLONE SODIUM SUCCINATE 125 MG/2ML
81.25 INJECTION, POWDER, LYOPHILIZED, FOR SOLUTION INTRAMUSCULAR; INTRAVENOUS ONCE
Status: COMPLETED | OUTPATIENT
Start: 2024-09-06 | End: 2024-09-06

## 2024-09-06 RX ORDER — ALBUTEROL SULFATE 90 UG/1
1-2 AEROSOL, METERED RESPIRATORY (INHALATION)
Status: CANCELLED
Start: 2024-09-06

## 2024-09-06 RX ORDER — HEPARIN SODIUM (PORCINE) LOCK FLUSH IV SOLN 100 UNIT/ML 100 UNIT/ML
5 SOLUTION INTRAVENOUS
Status: CANCELLED | OUTPATIENT
Start: 2024-09-06

## 2024-09-06 RX ORDER — METHYLPREDNISOLONE SODIUM SUCCINATE 125 MG/2ML
81.25 INJECTION, POWDER, LYOPHILIZED, FOR SOLUTION INTRAMUSCULAR; INTRAVENOUS ONCE
Status: CANCELLED | OUTPATIENT
Start: 2024-09-06

## 2024-09-06 RX ORDER — DIPHENHYDRAMINE HCL 25 MG
50 CAPSULE ORAL ONCE
Status: DISCONTINUED | OUTPATIENT
Start: 2024-09-06 | End: 2024-09-06

## 2024-09-06 RX ORDER — ALBUTEROL SULFATE 0.83 MG/ML
2.5 SOLUTION RESPIRATORY (INHALATION)
Status: CANCELLED | OUTPATIENT
Start: 2024-09-06

## 2024-09-06 RX ORDER — DIPHENHYDRAMINE HYDROCHLORIDE 50 MG/ML
50 INJECTION INTRAMUSCULAR; INTRAVENOUS
Status: CANCELLED
Start: 2024-09-06

## 2024-09-06 RX ORDER — HEPARIN SODIUM,PORCINE 10 UNIT/ML
5-20 VIAL (ML) INTRAVENOUS DAILY PRN
Status: CANCELLED | OUTPATIENT
Start: 2024-09-06

## 2024-09-06 RX ORDER — EPINEPHRINE 1 MG/ML
0.3 INJECTION, SOLUTION, CONCENTRATE INTRAVENOUS EVERY 5 MIN PRN
Status: CANCELLED | OUTPATIENT
Start: 2024-09-06

## 2024-09-06 RX ADMIN — RITUXIMAB-ABBS 1000 MG: 10 INJECTION, SOLUTION INTRAVENOUS at 10:04

## 2024-09-06 RX ADMIN — DIPHENHYDRAMINE HYDROCHLORIDE 50 MG: 50 INJECTION, SOLUTION INTRAMUSCULAR; INTRAVENOUS at 09:06

## 2024-09-06 RX ADMIN — ACETAMINOPHEN 650 MG: 325 TABLET ORAL at 08:39

## 2024-09-06 RX ADMIN — HYDROCORTISONE SODIUM SUCCINATE 100 MG: 100 INJECTION, POWDER, FOR SOLUTION INTRAMUSCULAR; INTRAVENOUS at 12:57

## 2024-09-06 RX ADMIN — METHYLPREDNISOLONE SODIUM SUCCINATE 81.25 MG: 125 INJECTION, POWDER, FOR SOLUTION INTRAMUSCULAR; INTRAVENOUS at 09:05

## 2024-09-06 NOTE — TELEPHONE ENCOUNTER
Spoke with Dr. Mckinnon via phone regarding patient's request for a dose of Solu-Cortef 100 mg at the midpoint of her Rituximab infusion today as she previously received in February.Verbal order received from Dr. Mckinnon, infusion plan updated and infusion team notified of new orders.     Krissy Blackwell RN  [facility]

## 2024-09-06 NOTE — PROGRESS NOTES
Infusion Nursing Note:  Janine CHELLE Whitmoreey presents today for Rituximab.    Patient seen by provider today: No   present during visit today: Not Applicable.    Note: Patient was administered her rituximab infusion starting at the 50mL/hr rate, increasing by 50mL/hr every 30 minutes to a max of 200mL/hr. Patient given pre-medications as ordered. Patient also administered Solu-Cortef half-way through infusion, stating that this has helped in the past; provider Ok'd and ordered.      Intravenous Access:  Peripheral IV placed by VAT.    Treatment Conditions:  Biological Infusion Checklist:  ~~~ NOTE: If the patient answers yes to any of the questions below, hold the infusion and contact ordering provider or on-call provider.    Have you recently had an elevated temperature, fever, chills, productive cough, coughing for 3 weeks or longer or hemoptysis,  abnormal vital signs, night sweats,  chest pain or have you noticed a decrease in your appetite, unexplained weight loss or fatigue? No  Do you have any open wounds or new incisions? No  Do you have any upcoming hospitalizations or surgeries? Does not include esophagogastroduodenoscopy, colonoscopy, endoscopic retrograde cholangiopancreatography (ERCP), endoscopic ultrasound (EUS), dental procedures or joint aspiration/steroid injections No  Do you currently have any signs of illness or infection or are you on any antibiotics? No  Have you had any new, sudden or worsening abdominal pain? No  Have you or anyone in your household received a live vaccination in the past 4 weeks? Please note: No live vaccines while on biologic/chemotherapy until 6 months after the last treatment. Patient can receive the flu vaccine (shot only), pneumovax and the Covid vaccine. It is optimal for the patient to get these vaccines mid cycle, but they can be given at any time as long as it is not on the day of the infusion. No  Have you recently been diagnosed with any new nervous system  diseases (ie. Multiple sclerosis, Guillain [place], seizures, neurological changes) or cancer diagnosis? Are you on any form of radiation or chemotherapy? No  Are you pregnant or breast feeding or do you have plans of pregnancy in the future? No  Have you been having any signs of worsening depression or suicidal ideations?  (benlysta only) No  Have there been any other new onset medical symptoms? No  Have you had any new blood clots? (IVIG only) No      Post Infusion Assessment:  Patient tolerated infusion without incident.  Blood return noted pre and post infusion.  Site patent and intact, free from redness, edema or discomfort.  No evidence of extravasations.  Access discontinued per protocol.       Discharge Plan:   Discharge instructions reviewed with: Patient.  Patient and/or family verbalized understanding of discharge instructions and all questions answered.  Patient discharged in stable condition accompanied by: self.  Departure Mode: Ambulatory.      Lena Guzman RN

## 2024-09-26 ENCOUNTER — INFUSION THERAPY VISIT (OUTPATIENT)
Dept: INFUSION THERAPY | Facility: CLINIC | Age: 59
End: 2024-09-26
Attending: INTERNAL MEDICINE
Payer: COMMERCIAL

## 2024-09-26 VITALS
HEIGHT: 63 IN | WEIGHT: 162.5 LBS | BODY MASS INDEX: 28.79 KG/M2 | SYSTOLIC BLOOD PRESSURE: 113 MMHG | DIASTOLIC BLOOD PRESSURE: 77 MMHG | HEART RATE: 80 BPM | RESPIRATION RATE: 18 BRPM

## 2024-09-26 DIAGNOSIS — M31.30 GRANULOMATOSIS WITH POLYANGIITIS WITHOUT RENAL INVOLVEMENT (H): Primary | ICD-10-CM

## 2024-09-26 DIAGNOSIS — I77.82 ANCA-ASSOCIATED VASCULITIS (H): ICD-10-CM

## 2024-09-26 PROCEDURE — 96413 CHEMO IV INFUSION 1 HR: CPT

## 2024-09-26 PROCEDURE — 250N000013 HC RX MED GY IP 250 OP 250 PS 637: Performed by: INTERNAL MEDICINE

## 2024-09-26 PROCEDURE — 96415 CHEMO IV INFUSION ADDL HR: CPT

## 2024-09-26 PROCEDURE — 258N000003 HC RX IP 258 OP 636: Performed by: INTERNAL MEDICINE

## 2024-09-26 PROCEDURE — 250N000011 HC RX IP 250 OP 636: Performed by: INTERNAL MEDICINE

## 2024-09-26 PROCEDURE — 96375 TX/PRO/DX INJ NEW DRUG ADDON: CPT

## 2024-09-26 RX ORDER — METHYLPREDNISOLONE SODIUM SUCCINATE 125 MG/2ML
81.25 INJECTION, POWDER, LYOPHILIZED, FOR SOLUTION INTRAMUSCULAR; INTRAVENOUS ONCE
Status: COMPLETED | OUTPATIENT
Start: 2024-09-26 | End: 2024-09-26

## 2024-09-26 RX ORDER — ALBUTEROL SULFATE 90 UG/1
1-2 AEROSOL, METERED RESPIRATORY (INHALATION)
Start: 2024-09-26

## 2024-09-26 RX ORDER — ACETAMINOPHEN 325 MG/1
650 TABLET ORAL ONCE
Status: COMPLETED | OUTPATIENT
Start: 2024-09-26 | End: 2024-09-26

## 2024-09-26 RX ORDER — ALBUTEROL SULFATE 0.83 MG/ML
2.5 SOLUTION RESPIRATORY (INHALATION)
OUTPATIENT
Start: 2024-09-26

## 2024-09-26 RX ORDER — HEPARIN SODIUM (PORCINE) LOCK FLUSH IV SOLN 100 UNIT/ML 100 UNIT/ML
5 SOLUTION INTRAVENOUS
OUTPATIENT
Start: 2024-09-26

## 2024-09-26 RX ORDER — METHYLPREDNISOLONE SODIUM SUCCINATE 125 MG/2ML
81.25 INJECTION, POWDER, LYOPHILIZED, FOR SOLUTION INTRAMUSCULAR; INTRAVENOUS ONCE
OUTPATIENT
Start: 2024-09-26

## 2024-09-26 RX ORDER — MEPERIDINE HYDROCHLORIDE 25 MG/ML
25 INJECTION INTRAMUSCULAR; INTRAVENOUS; SUBCUTANEOUS EVERY 30 MIN PRN
OUTPATIENT
Start: 2024-09-26

## 2024-09-26 RX ORDER — EPINEPHRINE 1 MG/ML
0.3 INJECTION, SOLUTION, CONCENTRATE INTRAVENOUS EVERY 5 MIN PRN
OUTPATIENT
Start: 2024-09-26

## 2024-09-26 RX ORDER — HEPARIN SODIUM,PORCINE 10 UNIT/ML
5-20 VIAL (ML) INTRAVENOUS DAILY PRN
OUTPATIENT
Start: 2024-09-26

## 2024-09-26 RX ORDER — METHYLPREDNISOLONE SODIUM SUCCINATE 125 MG/2ML
125 INJECTION, POWDER, LYOPHILIZED, FOR SOLUTION INTRAMUSCULAR; INTRAVENOUS
Start: 2024-09-26

## 2024-09-26 RX ORDER — DIPHENHYDRAMINE HYDROCHLORIDE 50 MG/ML
50 INJECTION INTRAMUSCULAR; INTRAVENOUS
Start: 2024-09-26

## 2024-09-26 RX ORDER — ACETAMINOPHEN 325 MG/1
650 TABLET ORAL ONCE
Start: 2024-09-26

## 2024-09-26 RX ADMIN — HYDROCORTISONE SODIUM SUCCINATE 100 MG: 100 INJECTION, POWDER, FOR SOLUTION INTRAMUSCULAR; INTRAVENOUS at 13:36

## 2024-09-26 RX ADMIN — ACETAMINOPHEN 650 MG: 325 TABLET ORAL at 09:05

## 2024-09-26 RX ADMIN — SODIUM CHLORIDE 250 ML: 9 INJECTION, SOLUTION INTRAVENOUS at 09:07

## 2024-09-26 RX ADMIN — RITUXIMAB-ABBS 1000 MG: 10 INJECTION, SOLUTION INTRAVENOUS at 10:04

## 2024-09-26 RX ADMIN — METHYLPREDNISOLONE SODIUM SUCCINATE 81.25 MG: 125 INJECTION, POWDER, FOR SOLUTION INTRAMUSCULAR; INTRAVENOUS at 09:05

## 2024-09-26 RX ADMIN — DIPHENHYDRAMINE HYDROCHLORIDE 50 MG: 50 INJECTION, SOLUTION INTRAMUSCULAR; INTRAVENOUS at 09:07

## 2024-09-26 NOTE — PROGRESS NOTES
Infusion Nursing Note:  Janineyael Whitmoreey presents today for rituximab.    Patient seen by provider today: No   present during visit today: Not Applicable.    Note: Patient did not pass biological checklist below; paged ordering provider.   Provider called back to state that it was OK to continue with infusion as planned.     Patient received pre-medications as ordered. Patient tolerated infusion rate up to 200mL/hour without issue; receiving SoluCortef half-way through.      Intravenous Access:  Peripheral IV placed.    Treatment Conditions:  Biological Infusion Checklist:  ~~~ NOTE: If the patient answers yes to any of the questions below, hold the infusion and contact ordering provider or on-call provider.    Have you recently had an elevated temperature, fever, chills, productive cough, coughing for 3 weeks or longer or hemoptysis,  abnormal vital signs, night sweats,  chest pain or have you noticed a decrease in your appetite, unexplained weight loss or fatigue? Yes, sweats, chest pain two days ago (took Nitro) Denies chest pain at this time.  Do you have any open wounds or new incisions? No  Do you have any upcoming hospitalizations or surgeries? Does not include esophagogastroduodenoscopy, colonoscopy, endoscopic retrograde cholangiopancreatography (ERCP), endoscopic ultrasound (EUS), dental procedures or joint aspiration/steroid injections No  Do you currently have any signs of illness or infection or are you on any antibiotics? No  Have you had any new, sudden or worsening abdominal pain? No  Have you or anyone in your household received a live vaccination in the past 4 weeks? Please note: No live vaccines while on biologic/chemotherapy until 6 months after the last treatment. Patient can receive the flu vaccine (shot only), pneumovax and the Covid vaccine. It is optimal for the patient to get these vaccines mid cycle, but they can be given at any time as long as it is not on the day of the infusion.  No  Have you recently been diagnosed with any new nervous system diseases (ie. Multiple sclerosis, Guillain [place], seizures, neurological changes) or cancer diagnosis? Are you on any form of radiation or chemotherapy? No  Are you pregnant or breast feeding or do you have plans of pregnancy in the future? No  Have you been having any signs of worsening depression or suicidal ideations?  (benlysta only) No  Have there been any other new onset medical symptoms? No  Have you had any new blood clots? (IVIG only) No      Post Infusion Assessment:  Patient tolerated infusion without incident.  Blood return noted pre and post infusion.  Site patent and intact, free from redness, edema or discomfort.  No evidence of extravasations.  Access discontinued per protocol.       Discharge Plan:   Discharge instructions reviewed with: Patient.  Patient and/or family verbalized understanding of discharge instructions and all questions answered.  Patient discharged in stable condition accompanied by: self.  Departure Mode: Ambulatory.      Lena Guzman RN

## 2024-10-11 ENCOUNTER — LAB (OUTPATIENT)
Dept: LAB | Facility: CLINIC | Age: 59
End: 2024-10-11
Payer: COMMERCIAL

## 2024-10-11 ENCOUNTER — OFFICE VISIT (OUTPATIENT)
Dept: RHEUMATOLOGY | Facility: CLINIC | Age: 59
End: 2024-10-11
Attending: INTERNAL MEDICINE
Payer: COMMERCIAL

## 2024-10-11 VITALS
WEIGHT: 162.9 LBS | OXYGEN SATURATION: 98 % | HEART RATE: 68 BPM | TEMPERATURE: 98.7 F | DIASTOLIC BLOOD PRESSURE: 79 MMHG | SYSTOLIC BLOOD PRESSURE: 119 MMHG | BODY MASS INDEX: 28.86 KG/M2

## 2024-10-11 DIAGNOSIS — M25.461 KNEE EFFUSION, RIGHT: ICD-10-CM

## 2024-10-11 DIAGNOSIS — M51.369 BULGING LUMBAR DISC: ICD-10-CM

## 2024-10-11 DIAGNOSIS — Z79.4 TYPE 2 DIABETES MELLITUS WITH OTHER CIRCULATORY COMPLICATION, WITH LONG-TERM CURRENT USE OF INSULIN (H): ICD-10-CM

## 2024-10-11 DIAGNOSIS — M79.2 NEURALGIA: ICD-10-CM

## 2024-10-11 DIAGNOSIS — G62.9 NEUROPATHY: ICD-10-CM

## 2024-10-11 DIAGNOSIS — E11.59 TYPE 2 DIABETES MELLITUS WITH OTHER CIRCULATORY COMPLICATION, WITH LONG-TERM CURRENT USE OF INSULIN (H): ICD-10-CM

## 2024-10-11 DIAGNOSIS — M25.561 ACUTE PAIN OF RIGHT KNEE: ICD-10-CM

## 2024-10-11 DIAGNOSIS — M25.461 KNEE EFFUSION, RIGHT: Primary | ICD-10-CM

## 2024-10-11 DIAGNOSIS — M48.07 SPINAL STENOSIS OF LUMBOSACRAL REGION: ICD-10-CM

## 2024-10-11 LAB
ALBUMIN MFR UR ELPH: <6 MG/DL
ALBUMIN SERPL BCG-MCNC: 4.9 G/DL (ref 3.5–5.2)
ALBUMIN UR-MCNC: NEGATIVE MG/DL
ALT SERPL W P-5'-P-CCNC: 23 U/L (ref 0–50)
APPEARANCE UR: CLEAR
AST SERPL W P-5'-P-CCNC: 26 U/L (ref 0–45)
BASOPHILS # BLD AUTO: 0.1 10E3/UL (ref 0–0.2)
BASOPHILS NFR BLD AUTO: 1 %
BILIRUB UR QL STRIP: NEGATIVE
COLOR UR AUTO: ABNORMAL
CREAT SERPL-MCNC: 1.15 MG/DL (ref 0.51–0.95)
CREAT UR-MCNC: 54.6 MG/DL
CRP SERPL-MCNC: 3.83 MG/L
EGFRCR SERPLBLD CKD-EPI 2021: 55 ML/MIN/1.73M2
EOSINOPHIL # BLD AUTO: 0.3 10E3/UL (ref 0–0.7)
EOSINOPHIL NFR BLD AUTO: 2 %
ERYTHROCYTE [DISTWIDTH] IN BLOOD BY AUTOMATED COUNT: 14.6 % (ref 10–15)
ERYTHROCYTE [SEDIMENTATION RATE] IN BLOOD BY WESTERGREN METHOD: 13 MM/HR (ref 0–30)
EST. AVERAGE GLUCOSE BLD GHB EST-MCNC: 177 MG/DL
GLUCOSE UR STRIP-MCNC: >=1000 MG/DL
HBA1C MFR BLD: 7.8 %
HCT VFR BLD AUTO: 42.5 % (ref 35–47)
HGB BLD-MCNC: 13.9 G/DL (ref 11.7–15.7)
HGB UR QL STRIP: NEGATIVE
IMM GRANULOCYTES # BLD: 0 10E3/UL
IMM GRANULOCYTES NFR BLD: 0 %
KETONES UR STRIP-MCNC: NEGATIVE MG/DL
LEUKOCYTE ESTERASE UR QL STRIP: NEGATIVE
LYMPHOCYTES # BLD AUTO: 2.6 10E3/UL (ref 0.8–5.3)
LYMPHOCYTES NFR BLD AUTO: 21 %
MCH RBC QN AUTO: 25.6 PG (ref 26.5–33)
MCHC RBC AUTO-ENTMCNC: 32.7 G/DL (ref 31.5–36.5)
MCV RBC AUTO: 78 FL (ref 78–100)
MONOCYTES # BLD AUTO: 0.7 10E3/UL (ref 0–1.3)
MONOCYTES NFR BLD AUTO: 6 %
NEUTROPHILS # BLD AUTO: 8.8 10E3/UL (ref 1.6–8.3)
NEUTROPHILS NFR BLD AUTO: 70 %
NITRATE UR QL: NEGATIVE
NRBC # BLD AUTO: 0 10E3/UL
NRBC BLD AUTO-RTO: 0 /100
PH UR STRIP: 5.5 [PH] (ref 5–7)
PLATELET # BLD AUTO: 350 10E3/UL (ref 150–450)
PROT/CREAT 24H UR: NORMAL MG/G{CREAT}
RBC # BLD AUTO: 5.42 10E6/UL (ref 3.8–5.2)
RBC URINE: 1 /HPF
SP GR UR STRIP: 1.03 (ref 1–1.03)
UROBILINOGEN UR STRIP-MCNC: NORMAL MG/DL
WBC # BLD AUTO: 12.6 10E3/UL (ref 4–11)
WBC URINE: <1 /HPF

## 2024-10-11 PROCEDURE — 82040 ASSAY OF SERUM ALBUMIN: CPT | Performed by: PATHOLOGY

## 2024-10-11 PROCEDURE — 81001 URINALYSIS AUTO W/SCOPE: CPT | Performed by: PATHOLOGY

## 2024-10-11 PROCEDURE — 36415 COLL VENOUS BLD VENIPUNCTURE: CPT | Performed by: PATHOLOGY

## 2024-10-11 PROCEDURE — 84460 ALANINE AMINO (ALT) (SGPT): CPT | Performed by: PATHOLOGY

## 2024-10-11 PROCEDURE — 99000 SPECIMEN HANDLING OFFICE-LAB: CPT | Performed by: PATHOLOGY

## 2024-10-11 PROCEDURE — 84156 ASSAY OF PROTEIN URINE: CPT | Performed by: PATHOLOGY

## 2024-10-11 PROCEDURE — 85652 RBC SED RATE AUTOMATED: CPT | Performed by: PATHOLOGY

## 2024-10-11 PROCEDURE — G0463 HOSPITAL OUTPT CLINIC VISIT: HCPCS | Performed by: INTERNAL MEDICINE

## 2024-10-11 PROCEDURE — 84450 TRANSFERASE (AST) (SGOT): CPT | Performed by: PATHOLOGY

## 2024-10-11 PROCEDURE — 99215 OFFICE O/P EST HI 40 MIN: CPT | Performed by: INTERNAL MEDICINE

## 2024-10-11 PROCEDURE — 82784 ASSAY IGA/IGD/IGG/IGM EACH: CPT | Performed by: INTERNAL MEDICINE

## 2024-10-11 PROCEDURE — 82565 ASSAY OF CREATININE: CPT | Performed by: PATHOLOGY

## 2024-10-11 PROCEDURE — 83036 HEMOGLOBIN GLYCOSYLATED A1C: CPT | Performed by: PHYSICIAN ASSISTANT

## 2024-10-11 PROCEDURE — 83516 IMMUNOASSAY NONANTIBODY: CPT | Performed by: INTERNAL MEDICINE

## 2024-10-11 PROCEDURE — 86036 ANCA SCREEN EACH ANTIBODY: CPT | Performed by: INTERNAL MEDICINE

## 2024-10-11 PROCEDURE — 86140 C-REACTIVE PROTEIN: CPT | Performed by: PATHOLOGY

## 2024-10-11 PROCEDURE — 85025 COMPLETE CBC W/AUTO DIFF WBC: CPT | Performed by: PATHOLOGY

## 2024-10-11 PROCEDURE — G2211 COMPLEX E/M VISIT ADD ON: HCPCS | Performed by: INTERNAL MEDICINE

## 2024-10-11 PROCEDURE — 86355 B CELLS TOTAL COUNT: CPT | Performed by: INTERNAL MEDICINE

## 2024-10-11 RX ORDER — PREGABALIN 25 MG/1
50 CAPSULE ORAL DAILY
Qty: 180 CAPSULE | Refills: 1 | Status: SHIPPED | OUTPATIENT
Start: 2024-10-11

## 2024-10-11 NOTE — LETTER
October 15, 2024      Janine CORBETT Kraig  [address] [facility] [address]        Dear ,    We are writing to inform you of your test results.    Inflammation markers are normal now. Pain seems to be due to neuropathy.    Resulted Orders   Immunoglobulins A G and M   Result Value Ref Range    Immunoglobulin G 582 (L) 610 - 1,616 mg/dL    Immunoglobulin A 157 84 - 499 mg/dL    Immunoglobulin M 25 (L) 35 - 242 mg/dL   ANCA IgG by IFA with Reflex to Titer   Result Value Ref Range    Neutrophil Cytoplasmic Antibody <1:10 <=1:10    Neutrophil Cytoplasmic Antibody Pattern       The ANCA IFA is <1:10.  No further testing will be performed.   CD19 B Cell Count   Result Value Ref Range    CD19% B Cells <1 (L) 6 - 27 %    Absolute CD19, B Cells 1 (L) 107 - 698 cells/uL    CD19 B Cell Comment     Erythrocyte sedimentation rate auto   Result Value Ref Range    Erythrocyte Sedimentation Rate 13 0 - 30 mm/hr   Protein  random urine   Result Value Ref Range    Total Protein Urine mg/dL <6.0   mg/dL    Total Protein Urine mg/mg Creat        Comment:      Unable to calculate, urine creatinine or protein is outside the detectable limits.    Creatinine Urine mg/dL 54.6 mg/dL   UA with Microscopic reflex to Culture   Result Value Ref Range    Color Urine Light Yellow Colorless, Straw, Light Yellow, Yellow    Appearance Urine Clear Clear    Glucose Urine >=1000 (A) Negative mg/dL    Bilirubin Urine Negative Negative    Ketones Urine Negative Negative mg/dL    Specific Gravity Urine 1.030 1.003 - 1.035    Blood Urine Negative Negative    pH Urine 5.5 5.0 - 7.0    Protein Albumin Urine Negative Negative mg/dL    Urobilinogen Urine Normal Normal, 2.0 mg/dL    Nitrite Urine Negative Negative    Leukocyte Esterase Urine Negative Negative    RBC Urine 1 <=2 /HPF    WBC Urine <1 <=5 /HPF    Narrative    Urine Culture not indicated   CRP inflammation   Result Value Ref Range    CRP Inflammation 3.83 <5.00 mg/L   Creatinine   Result  Value Ref Range    Creatinine 1.15 (H) 0.51 - 0.95 mg/dL    GFR Estimate 55 (L) >60 mL/min/1.73m2      Comment:      eGFR calculated using 2021 CKD-EPI equation.   Albumin level   Result Value Ref Range    Albumin 4.9 3.5 - 5.2 g/dL   Myeloperoxidase and Proteinase 3 Panel   Result Value Ref Range    MPO Marline IgG Instrument Value 1.3 <3.5 U/mL    Myeloperoxidase Antibody IgG Negative Negative    Proteinase 3 Marline IgG Instrument Value <1.0 <2.0 U/mL    Proteinase 3 Antibody IgG Negative Negative   ALT   Result Value Ref Range    ALT 23 0 - 50 U/L   AST   Result Value Ref Range    AST 26 0 - 45 U/L   CBC with platelets and differential   Result Value Ref Range    WBC Count 12.6 (H) 4.0 - 11.0 10e3/uL    RBC Count 5.42 (H) 3.80 - 5.20 10e6/uL    Hemoglobin 13.9 11.7 - 15.7 g/dL    Hematocrit 42.5 35.0 - 47.0 %    MCV 78 78 - 100 fL    MCH 25.6 (L) 26.5 - 33.0 pg    MCHC 32.7 31.5 - 36.5 g/dL    RDW 14.6 10.0 - 15.0 %    Platelet Count 350 150 - 450 10e3/uL    % Neutrophils 70 %    % Lymphocytes 21 %    % Monocytes 6 %    % Eosinophils 2 %    % Basophils 1 %    % Immature Granulocytes 0 %    NRBCs per 100 WBC 0 <1 /100    Absolute Neutrophils 8.8 (H) 1.6 - 8.3 10e3/uL    Absolute Lymphocytes 2.6 0.8 - 5.3 10e3/uL    Absolute Monocytes 0.7 0.0 - 1.3 10e3/uL    Absolute Eosinophils 0.3 0.0 - 0.7 10e3/uL    Absolute Basophils 0.1 0.0 - 0.2 10e3/uL    Absolute Immature Granulocytes 0.0 <=0.4 10e3/uL    Absolute NRBCs 0.0 10e3/uL       If you have any questions or concerns, please call the clinic at the number listed above.       Sincerely,      Majo Mckinnon MD

## 2024-10-11 NOTE — LETTER
10/11/2024       RE: Janine Cornell  [address]  [facility] [address]     Dear Colleague,    Thank you for referring your patient, Janine Cornell, to the [facility] RHEUMAT[facility] [place] at [facility]. Please see a copy of my visit note below.    Rheumatology F/U In Person Visit Note    Last visit note: 6/5/2024    Today's visit date: 10/11/2024    Reason for in person visit: F/U GPA, high risk med use    HPI     Ms. 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 She is s/p tx with HCQ since 5/2014, now off due to abnormal eye exam. On rituximab since 12/20218 with great response. Previously tried and did not tolerate MTX, AZA.    She had lateral orbitotomy and debulking orbital mass right eye on 9/26/18 with Dr. Shah and Dr. Valdez at [place]. 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 There were no complications according to the op note.    Today 10/11/2024:    -lots of pain affecting feet, legs    -has not left house since June, bottom of feet swell up    -hair is coming out    -rituximab does not help with pain    -has swelling inside mouth    -the other day, R side of face swelled up    -after last rituxiamb, had candidia infection under breast, BG was down    -had sweating fater rituximab    -lyrica is not helping    9/6, 9/26 rituximab 1 gram    6/5/2024:    -reports arm weakness    -has back pain    -continues to have joint pain    -has more headaches, R eye pain    -neuropathy is an issue     1/31/2024:      Nortriptyline was prescribed by another provider, but has not tried it yet    R side of her face started swelling again.    Reports pain over neck, hips, knees and hands.          9/6/2023:    -reports pain/swelling over hands/feet, has more arthritis problem over past year  -gets numbness over toes, bottom of feet,  can't see neurology till 1/2024  -walking causes pain in the feet  -gets pain over hips after a trip to [facility], it is a new problem  -sometimes gets swelling over R cheek, noticed it yesterday outside with heat, her face was also bright red        3/9/2023: Reports pain/swelling of her hands. S/p last rituximab 1 gram on 9/7/2022.       1/25/2023: Janine is doing a phone visit for follow up, was feeling ill and switched in person to phone visit. [facility]CP on 12/17, was tested positive for COVID, it took a longtime to feel better, did not take paxlovid as it was already past 2 weeks. Since then, she has not been feeling well. Has headaches, body ache, her eyes especially R eye look pink, they burn and itch a lot. Has sense of smell but can not taste her food. She is very tired, hardly leaves her house. Last time, left house for doctor visit, was tired and sore. Has tingling and pinprick feeling over arms and legs. Has upcoming follow up with PCP on 2/10. She had thyroid US for thyroid nodules. She had abnormal screening mammogram on 1/9, will go back for diagnostic mammogram and US of L breast on 1/31.          08/19/2022  Reports fatigue and headaches. Headache worsens after taking Zofran for nausea. Joint symptoms come and go. Last rituximab on 4/6/22. Rituximab is helpful but feels like it wears off prior to the 6 months. Next appointment scheduled for October 2022. Develops intermittent vaginal yeast infection and thrush related to rising blood glucose. Right eye without symptoms. No new fevers, chills, skin changes. Son is wondering if she takes herbal supplements for headaches will this interact with any of her medications. Scheduled to meet with pharmacist today.        4/22/2022:   Each rituximab infusion causes vaginal yeast infection and thrush related to rise in BG. Her BG has stayed in higher level since last rituximab. Has more ache and pain, myalgia and arthralgia.  Recently has had headaches with high BP. Had last rituximab 1000 mg on 4/6.Her R eye is itching and swelling up.  Today, her BG is 177.Has gained 20 lbs since Feb 2022. Had severe pain in her arm after covid vaccine, it took a month to get better.      12/16/2021: Janine presents for follow up. Reports ESOB with cold, has ongoing joint pain. R eye pain is intermittent.  Reports headaches after rituximab 1 gram on 10/18/2021.  Last week, her R knee was hurting.      8/20/2021: Janine has pain over arms, knees. Some days, feel stiff all day long. Some days can't do stairs. Hard to tell if there is swelling as has more water retention during summer.  Some days, eye swells up like today. No fevers, SOB, CP or cough.  Has rosacea but some days wakes up with heat rash over sun. Her face is peeling.  Sometimes can't lift things or grab things with pain from elbow to hands. Has shoulder and neck pain but this forearm pain is new.  Stopped HCQ in mid July due to concern for potential HCQ toxicity on OCT, her eye exam with Dr. Vernon has been re-scheduled and upcoming on 9/14 to address HCQ toxicity, pain is not worse off HCQ.  Not COVID vaccinated but is cautious.      5/10/2021: Joint ache, knees hurt, R elbow hurts, R arm hurts, R hand hurts. Has lots of swelling in her joints especially hands.    Depending on weather, joints jhurt, sometimes pain is 7/10.  Has problem with taking stairs and balance.  Gets off balance.   R eye gets tinglin, swelling.  Gets out of breath, gets worse with seasonal allergies.  Over past month and half, took nitro more, like 8 times.  No hemooptysis, no hematuria.  Has allergies.      4/21/2021: Had last rituximab 1 gram on 1/19, 2/2/2021. Reports rituximab starts to wear off earlier, has more sx this time. Eye swelling is intermittent. Gets indigestion/ GERD sx with constipation after the infusion.  She reports having asthma, swelling of neck, arms. She thinks that it could be from her vasculitis.  She is worried about going down on rituximab dose.   Otherwise unchanged sx, no SOB or hemoptysis or persistent cough, or   Somedays her knees and hips hurt a lot, feel like bone on bone in her knees, especially on changing position.      Component      Latest Ref Rng 2/28/2013 2/28/2013          12:14 PM 12:14 PM   WBC      4.0 - 11.0 10e9/L     RBC Count      3.8 - 5.2 10e12/L     Hemoglobin      11.7 - 15.7 g/dL     Hematocrit      35.0 - 47.0 %     MCV      78 - 100 fl     MCH      26.5 - 33.0 pg     MCHC      31.5 - 36.5 g/dL     RDW      10.0 - 15.0 %     Platelet Count      150 - 450 10e9/L     Specimen Description           Lyme Screen IgG and IgM           Vitamin D Deficiency screening      30 - 75 ug/L     Ferritin      10 - 300 ng/mL     Sed Rate      0 - 20 mm/h     ALLI Screen by EIA      <1.0     Rheumatoid Factor      0 - 14 IU/mL     CK Total      30 - 225 U/L  78   Uric Acid      2.5 - 7.5 mg/dL 6.7      Component      Latest Ref Rng 2/28/2013          12:14 PM   WBC      4.0 - 11.0 10e9/L    RBC Count      3.8 - 5.2 10e12/L    Hemoglobin      11.7 - 15.7 g/dL    Hematocrit      35.0 - 47.0 %    MCV      78 - 100 fl    MCH      26.5 - 33.0 pg    MCHC      31.5 - 36.5 g/dL    RDW      10.0 - 15.0 %    Platelet Count      150 - 450 10e9/L    Specimen Description       Serum   Lyme Screen IgG and IgM       Test value: <0.75....Interpretation: Negative....If you highly suspect Lyme . . .   Vitamin D Deficiency screening      30 - 75 ug/L    Ferritin      10 - 300 ng/mL    Sed Rate      0 - 20 mm/h    ALLI Screen by EIA      <1.0    Rheumatoid Factor      0 - 14 IU/mL    CK Total      30 - 225 U/L    Uric Acid      2.5 - 7.5 mg/dL      Component      Latest Ref Rng 2/28/2013 2/28/2013 2/28/2013 2/28/2013          12:14 PM 12:14 PM 12:14 PM 12:14 PM   WBC      4.0 - 11.0 10e9/L       RBC Count      3.8 - 5.2 10e12/L       Hemoglobin      11.7 - 15.7 g/dL       Hematocrit      35.0 - 47.0 %       MCV       78 - 100 fl       MCH      26.5 - 33.0 pg       MCHC      31.5 - 36.5 g/dL       RDW      10.0 - 15.0 %       Platelet Count      150 - 450 10e9/L       Specimen Description             Lyme Screen IgG and IgM             Vitamin D Deficiency screening      30 - 75 ug/L       Ferritin      10 - 300 ng/mL    10   Sed Rate      0 - 20 mm/h   23 (H)    ALLI Screen by EIA      <1.0  <1.0 . . .     Rheumatoid Factor      0 - 14 IU/mL 26 (H)      CK Total      30 - 225 U/L       Uric Acid      2.5 - 7.5 mg/dL         5/2013  CBC WITH PLATELETS DIFFERENTIAL       Component Value Range    WBC 11.3 (*) 4.0 - 11.0 10e9/L    RBC Count 4.56  3.8 - 5.2 10e12/L    Hemoglobin 11.1 (*) 11.7 - 15.7 g/dL    Hematocrit 34.3 (*) 35.0 - 47.0 %    MCV 75 (*) 78 - 100 fl    MCH 24.3 (*) 26.5 - 33.0 pg    MCHC 32.4  31.5 - 36.5 g/dL    RDW 16.1 (*) 10.0 - 15.0 %    Platelet Count 315  150 - 450 10e9/L    Diff Method Automated Method      % Neutrophils 71.6  40 - 75 %    % Lymphocytes 20.9  20 - 48 %    % Monocytes 4.3  0 - 12 %    % Eosinophils 2.8  0 - 6 %    % Basophils 0.2  0 - 2 %    % Immature Granulocytes 0.2  0 - 0.4 %    Absolute Neutrophil 8.1  1.6 - 8.3 10e9/L    Absolute Lymphocytes 2.4  0.8 - 5.3 10e9/L    Absolute Monoctyes 0.5  0.0 - 1.3 10e9/L    Absolute Eosinophils 0.3  0.0 - 0.7 10e9/L    Absolute Basophils 0.0  0.0 - 0.2 10e9/L    Abs Immature Granulocytes 0.0  0 - 0.03 10e9/L   AMYLASE       Component Value Range    Amylase 103  30 - 110 U/L   COMPREHENSIVE METABOLIC PANEL       Component Value Range    Sodium 144  133 - 144 mmol/L    Potassium 3.8  3.4 - 5.3 mmol/L    Chloride 105  94 - 109 mmol/L    Carbon Dioxide 23  20 - 32 mmol/L    Anion Gap 17  6 - 17 mmol/L    Glucose 173 (*) 60 - 99 mg/dL    Urea Nitrogen 13  5 - 24 mg/dL    Creatinine 0.83  0.52 - 1.04 mg/dL    GFR Estimate 74  >60 mL/min/1.7m2    GFR Estimate If Black 90  >60 mL/min/1.7m2    Calcium 9.7  8.5 - 10.4 mg/dL    Bilirubin Total 0.4  0.2 - 1.3 mg/dL     Albumin 4.3  3.9 - 5.1 g/dL    Protein Total 7.8  6.8 - 8.8 g/dL    Alkaline Phosphatase 66  40 - 150 U/L    ALT 36  0 - 50 U/L    AST 28  0 - 45 U/L   CK TOTAL       Component Value Range    CK Total 66  30 - 225 U/L   CRP INFLAMMATION       Component Value Range    CRP Inflammation 10.4 (*) 0.0 - 8.0 mg/L   LIPASE       Component Value Range    Lipase 353 (*) 20 - 250 U/L   ERYTHROCYTE SEDIMENTATION RATE AUTO       Component Value Range    Sed Rate 26 (*) 0 - 20 mm/h   ROUTINE UA WITH MICROSCOPIC REFLEX TO CULTURE       Component Value Range    Color Urine Yellow      Appearance Urine Slightly Cloudy      Glucose Urine 30 (*) NEG mg/dL    Bilirubin Urine Negative  NEG    Ketones Urine 5 (*) NEG mg/dL    Specific Gravity Urine 1.026  1.003 - 1.035    Blood Urine Trace (*) NEG    pH Urine 5.0  5.0 - 7.0 pH    Protein Albumin Urine 10 (*) NEG mg/dL    Urobilinogen mg/dL Normal  0.0 - 2.0 mg/dL    Nitrite Urine Negative  NEG    Leukocyte Esterase Urine Negative  NEG    Source Midstream Urine      WBC Urine 1  0 - 2 /HPF    RBC Urine 4 (*) 0 - 2 /HPF    Squamous Epithelial /HPF Urine <1  0 - 1 /HPF    Mucous Urine Present (*) NEG /LPF    Hyaline Casts 3 (*) 0 - 2 /LPF    Calcium Oxalate Moderate (*) NEG /HPF   COMPLEMENT C3       Component Value Range    Complement C3 143  76 - 169 mg/dL   COMPLEMENT C4       Component Value Range    Complement C4 31  15 - 50 mg/dL   HEPATITIS C ANTIBODY       Component Value Range    Hepatitis C Antibody Negative  NEG   CARDIOLIPIN ANTIBODY IGG AND IGM       Component Value Range    Cardiolipin IgG Marline    0 - 15.0 GPL    Value: <15.0      Interpretation:  Negative    Cardiolipin IgM Marline    0 - 12.5 MPL    Value: <12.5      Interpretation:  Negative   LUPUS PANEL       Component Value Range    Lupus Result    NEG    Value: Negative      (Note)      COMMENTS:      The INR is normal.      APTT is normal.  1:2 Mix is not indicated.      DRVVT Screen is normal.      Thrombin time is  normal.      NEGATIVE TEST; A LUPUS ANTICOAGULANT WAS NOT DETECTED IN THIS      SPECIMEN WITHIN THE LIMITS OF THE TESTING REPERTOIRE.      If the clinical picture is strongly suggestive of an antiphospholipid      syndrome, recommend anticardiolipin and beta-2-glycoprotein (IgG and      IgM) antibody tests.      Leela Franks M.D.  [phone]      5/2/2013            INR =  0.93 N = 0.86-1.14  (No additional charge)      TT = 15.7 N = 13.0-19.0 sec  (No additional charge)            APTT'S:    Seconds      Reagent =  Stago LA      Normal  =  38      Patient  =  34      1:2 Mix  =  N/A      Reference =  31-43             DILUTE MADELINE VIPER VENOM TEST:      DRVVT Screen Ratio = 0.76 Normal = <1.21         IMMUNOGLOBULIN G SUBCLASSES       Component Value Range    IGG 1030  695 - 1620 mg/dL    IgG1 488  300 - 856 mg/dL    IgG2 325  158 - 761 mg/dL    IgG3 47  24 - 192 mg/dL    IgG4 18  11 - 86 mg/dL   SUNNY ANTIBODY PANEL       Component Value Range    Ribonucleic Protein IgG Antibody 0      Smith Antibody IgG 1      SSA (RO) Antibody IgG 4      SSB (LA) Antibody IgG 0      Scleroderma Antibody IgG 0     BETA 2 GLYCOPROTEIN ANTIBODIES IGG IGM       Component Value Range    Beta-2-Glycopro IgG 1      Beta-2-Glycopro IgM 5     CYCLIC CIT PEPT IGG       Component Value Range    Cyclic Cit Pept IgG/IgA    <20 UNITS    Value: <20      Interpretation:  Negative   DNA DOUBLE STRANDED ANTIBODIES       Component Value Range    DNA-ds    0 - 29 IU/mL    Value: <15      Interpretation:  Negative       CT CHEST PULMONARY EMBOLISM W CONTRAST 5/20/2015 4:57 PM  HISTORY: Pain. SOB. Elevated d-dimer.   TECHNIQUE: 65 mL Isovue 370. Axial images with coronal  reconstructions.  COMPARISON: None.  FINDINGS: Calcified granuloma with surrounding scarring in the  posterolateral left upper lobe. Triangular shaped opacity at the right  lung base in the lateral right middle lobe could represent some  scarring, atelectasis or  "infiltrate. There is also some scarring or  atelectasis in the posteromedial right lung base. Lungs otherwise  clear.  The pulmonary arteries are well opacified. No CT evidence for acute  pulmonary embolus. No aortic dissection.  Diffuse fatty infiltration of the liver.  IMPRESSION  IMPRESSION:   1. No pulmonary embolus identified.  2. Small focus of atelectasis, infiltrate or scarring in the lateral  right middle lobe.  3. Old granulomatous disease.  4. Otherwise negative.  SILVERIO VAZQUEZ MD    Copath Report      Patient Name: FAVIO MARTINEZ   MR#: [id number]   Specimen #: [id number]   Collected: 3/15/2016   Received: 3/15/2016   Reported: 3/17/2016 13:33   Ordering Phy(s): PARVEEN ENRIQUEZ     ORIGINAL REPORT FOLLOWS ADDENDUM  ADDENDUM     TO ORIGINAL REPORT   Status: Signed Out   Date Ordered:3/18/2016   Date Complete:3/18/2016   Date Reported:3/18/2016 12:06   Signed Out By: Marianne Godfrey MD     INTERPRETATION:   This addendum is done to incorporate the results of fungal (GMS) stains   done on the specimen.  Specimen is negative for fungal organisms.  The   original final diagnosis remains unchanged.     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  Negative for malignancy(Please   see microscopic description)     Electronically signed out by:     Marianne Godfrey MD     CLINICAL HISTORY:   right orbital mass     GROSS:   The specimen is received labeled \"right orbital biopsy\" and consists of   red-tan nodule measuring 1.5 x 0.9 x 0.6 cm with one smooth side and   opposite rough side consistent with periosteum.  The specimen is   bisected revealing homogenous pale tan fleshy cut surface.  Touch   preparations are prepared, air dried and fixed, portion of specimen is   submitted in RPMI for " possible lymphoma workup [facility],   ([facility], [place], [place] ).  The remainder is entirely submitted.   (Dictated by: Marianne Godfrey MD 3/15/2016 04:45 PM)     MICROSCOPIC:     Specimen consists of portion of lacrimal gland with acute and chronic   inflammation.  Focal area of microabscess formation associated with   small areas of necrosis are also present.  Inflammation is seen   extending to the periorbital adipose tissue forming panniculitis.   Specimen is negative for malignancy.  Samples sent for immunophenotyping   to [facility], ([facility], [place], [place] ) reveals no evidence   of monoclonality or aberrant antigen expression.  A GMS (fungal) stain   is pending and results will be reported as an addendum.     CPT Codes:   A: 02687-WN6, 97532-FSQVZ, SOH, 38431-YZIQQ, 48229-VJEQ     TESTING LAB LOCATION:   [address]  [id number] [phone]     COLLECTION SITE:   Client: [facility]   Location: [facility] (S)            Component      Latest Ref Rng 4/29/2016   Nucleated RBCs      0 /100 0   Absolute Neutrophil      1.6 - 8.3 10e9/L 8.9 (H)   Absolute Lymphocytes      0.8 - 5.3 10e9/L 3.2   Absolute Monocytes      0.0 - 1.3 10e9/L 0.8   Absolute Eosinophils      0.0 - 0.7 10e9/L 0.2   Absolute Basophils      0.0 - 0.2 10e9/L 0.1   Abs Immature Granulocytes      0 - 0.4 10e9/L 0.1   Absolute Nucleated RBC       0.0   IGG      695 - 1620 mg/dL 836   IgG1      300 - 856 mg/dL 280 (L)   IgG2      158 - 761 mg/dL 277   IgG3      24 - 192 mg/dL 35   IgG4      11 - 86 mg/dL 16   RNP Antibody IgG      0.0 - 0.9 AI <0.2 . . .   Smith SUNNY Antibody IgG      0.0 - 0.9 AI <0.2 . . .   SSA (Ro) (SUNNY) Antibody, IgG      0.0 - 0.9 AI <0.2 . . .   SSB (La) (SUNNY) Antibody, IgG      0.0 - 0.9 AI <0.2 . . .   Scleroderma Antibody Scl-70 SUNNY IgG      0.0 - 0.9 AI <0.2 . . .   Cholesterol      <200 mg/dL 154   Triglycerides      <150 mg/dL  220 (H)   HDL Cholesterol      >49 mg/dL 42 (L)   LDL Cholesterol Calculated      <100 mg/dL 67   Non HDL Cholesterol      <130 mg/dL 111   M Tuberculosis Result      NEG Negative   M Tuberculosis Antigen Value       0.00   Albumin      3.4 - 5.0 g/dL 4.3   ALT      0 - 50 U/L 30   AST      0 - 45 U/L 10   Complement C3      76 - 169 mg/dL 157   Complement C4      15 - 50 mg/dL 32   CRP Inflammation      0.0 - 8.0 mg/L <2.9   Sed Rate      0 - 20 mm/h 2   DNA-ds      <10 IU/mL 1   Cyclic Citrullinated Peptide Antibody, IgG      <7 U/mL 1   Rheumatoid Factor      <20 IU/mL <20   Proteinase 3 Antibody IgG      0.0 - 0.9 AI <0.2 . . .   Myeloperoxidase Antibody IgG      0.0 - 0.9 AI 2.5 (H)   Vitamin D Deficiency screening      20 - 75 ug/L 24   Hemoglobin A1C      4.3 - 6.0 % 7.9 (H)   ALLI by IFA IgG       1:40 (A) . . .     Component      Latest Ref Rng & Units 1/16/2021   WBC      4.0 - 11.0 10e9/L    RBC Count      3.8 - 5.2 10e12/L    Hemoglobin      11.7 - 15.7 g/dL    Hematocrit      35.0 - 47.0 %    MCV      78 - 100 fl    MCH      26.5 - 33.0 pg    MCHC      31.5 - 36.5 g/dL    RDW      10.0 - 15.0 %    Platelet Count      150 - 450 10e9/L    Diff Method          % Neutrophils      %    % Lymphocytes      %    % Monocytes      %    % Eosinophils      %    % Basophils      %    % Immature Granulocytes      %    Nucleated RBCs      0 /100    Absolute Neutrophil      1.6 - 8.3 10e9/L    Absolute Lymphocytes      0.8 - 5.3 10e9/L    Absolute Monocytes      0.0 - 1.3 10e9/L    Absolute Eosinophils      0.0 - 0.7 10e9/L    Absolute Basophils      0.0 - 0.2 10e9/L    Abs Immature Granulocytes      0 - 0.4 10e9/L    Absolute Nucleated RBC          IGG      610 - 1,616 mg/dL    IGA      84 - 499 mg/dL    IGM      35 - 242 mg/dL    Creatinine      0.52 - 1.04 mg/dL    GFR Estimate      >60 mL/min/1.73:m2    GFR Estimate If Black      >60 mL/min/1.73:m2    COVID-19 Virus PCR to [facility] - Source       Nasopharyngeal    COVID-19 Virus PCR to [facility] - Result       Not Detected   Neutrophil Cytoplasmic Antibody      <1:10 titer    Neutrophil Cytoplasmic Antibody Pattern          CD19 B Cells      6 - 27 %    Absolute CD19      107 - 698 cells/uL    Myeloperoxidase Antibody IgG      0.0 - 0.9 AI    Proteinase 3 Antibody IgG      0.0 - 0.9 AI    Vitamin D Deficiency screening      20 - 75 ug/L    Sed Rate      0 - 30 mm/h    CRP Inflammation      0.0 - 8.0 mg/L    Albumin      3.4 - 5.0 g/dL    ALT      0 - 50 U/L    AST      0 - 45 U/L      Component      Latest Ref Rng & Units 5/7/2021   WBC      4.0 - 11.0 10e9/L 8.9   RBC Count      3.8 - 5.2 10e12/L 4.89   Hemoglobin      11.7 - 15.7 g/dL 12.7   Hematocrit      35.0 - 47.0 % 39.7   MCV      78 - 100 fl 81   MCH      26.5 - 33.0 pg 26.0 (L)   MCHC      31.5 - 36.5 g/dL 32.0   RDW      10.0 - 15.0 % 13.7   Platelet Count      150 - 450 10e9/L 336   Diff Method       Automated Method   % Neutrophils      % 65.3   % Lymphocytes      % 20.7   % Monocytes      % 7.8   % Eosinophils      % 5.3   % Basophils      % 0.7   % Immature Granulocytes      % 0.2   Nucleated RBCs      0 /100 0   Absolute Neutrophil      1.6 - 8.3 10e9/L 5.8   Absolute Lymphocytes      0.8 - 5.3 10e9/L 1.8   Absolute Monocytes      0.0 - 1.3 10e9/L 0.7   Absolute Eosinophils      0.0 - 0.7 10e9/L 0.5   Absolute Basophils      0.0 - 0.2 10e9/L 0.1   Abs Immature Granulocytes      0 - 0.4 10e9/L 0.0   Absolute Nucleated RBC       0.0   IGG      610 - 1,616 mg/dL 649   IGA      84 - 499 mg/dL 199   IGM      35 - 242 mg/dL 36   Creatinine      0.52 - 1.04 mg/dL 0.96   GFR Estimate      >60 mL/min/1.73:m2 66   GFR Estimate If Black      >60 mL/min/1.73:m2 77   COVID-19 Virus PCR to [facility] - Source          COVID-19 Virus PCR to [facility] - Result          Neutrophil Cytoplasmic Antibody      <1:10 titer <1:10   Neutrophil Cytoplasmic Antibody Pattern       The ANCA IFA is <1:10.  No further testing will be performed.    CD19 B Cells      6 - 27 % <1 (L)   Absolute CD19      107 - 698 cells/uL <1 (L)   Myeloperoxidase Antibody IgG      0.0 - 0.9 AI 1.1 (H)   Proteinase 3 Antibody IgG      0.0 - 0.9 AI <0.2   Vitamin D Deficiency screening      20 - 75 ug/L 41   Sed Rate      0 - 30 mm/h 9   CRP Inflammation      0.0 - 8.0 mg/L <2.9   Albumin      3.4 - 5.0 g/dL 4.1   ALT      0 - 50 U/L 28   AST      0 - 45 U/L 18     Lab on 07/08/2022   Component Date Value Ref Range Status     Sodium 07/08/2022 143  133 - 144 mmol/L Final     Potassium 07/08/2022 3.9  3.4 - 5.3 mmol/L Final     Chloride 07/08/2022 108  94 - 109 mmol/L Final     Carbon Dioxide (CO2) 07/08/2022 25  20 - 32 mmol/L Final     Anion Gap 07/08/2022 10  3 - 14 mmol/L Final     Urea Nitrogen 07/08/2022 17  7 - 30 mg/dL Final     Creatinine 07/08/2022 1.01  0.52 - 1.04 mg/dL Final     Calcium 07/08/2022 9.3  8.5 - 10.1 mg/dL Final     Glucose 07/08/2022 103 (A) 70 - 99 mg/dL Final     GFR Estimate 07/08/2022 65  >60 mL/min/1.73m2 Final    Effective December 21, 2021 eGFRcr in adults is calculated using the 2021 CKD-EPI creatinine equation which includes age and gender (Ryan et al., [facility], DOI: 10.1056/SKFAss4610788)     WBC Count 07/08/2022 12.0 (A) 4.0 - 11.0 10e3/uL Final     RBC Count 07/08/2022 4.94  3.80 - 5.20 10e6/uL Final     Hemoglobin 07/08/2022 12.6  11.7 - 15.7 g/dL Final     Hematocrit 07/08/2022 39.6  35.0 - 47.0 % Final     MCV 07/08/2022 80  78 - 100 fL Final     MCH 07/08/2022 25.5 (A) 26.5 - 33.0 pg Final     MCHC 07/08/2022 31.8  31.5 - 36.5 g/dL Final     RDW 07/08/2022 13.6  10.0 - 15.0 % Final     Platelet Count 07/08/2022 350  150 - 450 10e3/uL Final     % Neutrophils 07/08/2022 66  % Final     % Lymphocytes 07/08/2022 22  % Final     % Monocytes 07/08/2022 9  % Final     % Eosinophils 07/08/2022 3  % Final     % Basophils 07/08/2022 0  % Final     % Immature Granulocytes 07/08/2022 0  % Final     NRBCs per 100 WBC 07/08/2022 0  <1 /100 Final      Absolute Neutrophils 07/08/2022 7.9  1.6 - 8.3 10e3/uL Final     Absolute Lymphocytes 07/08/2022 2.7  0.8 - 5.3 10e3/uL Final     Absolute Monocytes 07/08/2022 1.1  0.0 - 1.3 10e3/uL Final     Absolute Eosinophils 07/08/2022 0.3  0.0 - 0.7 10e3/uL Final     Absolute Basophils 07/08/2022 0.1  0.0 - 0.2 10e3/uL Final     Absolute Immature Granulocytes 07/08/2022 0.0  <=0.4 10e3/uL Final     Absolute NRBCs 07/08/2022 0.0  10e3/uL Final     Component      Latest Ref Rng & Units 8/19/2022   WBC      4.0 - 11.0 10e3/uL 12.6 (H)   RBC Count      3.80 - 5.20 10e6/uL 5.06   Hemoglobin      11.7 - 15.7 g/dL 12.8   Hematocrit      35.0 - 47.0 % 40.4   MCV      78 - 100 fL 80   MCH      26.5 - 33.0 pg 25.3 (L)   MCHC      31.5 - 36.5 g/dL 31.7   RDW      10.0 - 15.0 % 14.1   Platelet Count      150 - 450 10e3/uL 387   % Neutrophils      % 71   % Lymphocytes      % 20   % Monocytes      % 6   % Eosinophils      % 3   % Basophils      % 0   % Immature Granulocytes      % 0   NRBCs per 100 WBC      <1 /100 0   Absolute Neutrophils      1.6 - 8.3 10e3/uL 8.8 (H)   Absolute Lymphocytes      0.8 - 5.3 10e3/uL 2.5   Absolute Monocytes      0.0 - 1.3 10e3/uL 0.8   Absolute Eosinophils      0.0 - 0.7 10e3/uL 0.4   Absolute Basophils      0.0 - 0.2 10e3/uL 0.1   Absolute Immature Granulocytes      <=0.4 10e3/uL 0.1   Absolute NRBCs      10e3/uL 0.0   Color Urine      Colorless, Straw, Light Yellow, Yellow Straw   Appearance Urine      Clear Clear   Glucose Urine      Negative mg/dL 1000 (A)   Bilirubin Urine      Negative Negative   Ketones Urine      Negative mg/dL Negative   Specific Gravity Urine      1.003 - 1.035 1.020   Blood Urine      Negative Negative   pH Urine      5.0 - 7.0 5.0   Protein Albumin Urine      Negative mg/dL Negative   Urobilinogen mg/dL      Normal, 2.0 mg/dL Normal   Nitrite Urine      Negative Negative   Leukocyte Esterase Urine      Negative Negative   RBC Urine      <=2 /HPF 1   WBC Urine      <=5 /HPF 5    Squamous Epithelial /HPF Urine      <=1 /HPF <1   MPO Marline IgG Instrument Value      <3.5 U/mL 0.7   Myeloperoxidase Antibody IgG      Negative Negative   Proteinase 3 Marline IgG Instrument Value      <2.0 U/mL <1.0   Proteinase 3 Antibody IgG      Negative Negative   Protein Random Urine      g/L 0.11   Protein Total Urine g/gr Creatinine      0.00 - 0.20 g/g Cr 0.09   Creatinine Urine      mg/dL 128   IGG      610 - 1,616 mg/dL 641   IGA      84 - 499 mg/dL 204   IGM      35 - 242 mg/dL 32 (L)   Creatinine      0.52 - 1.04 mg/dL 1.24 (H)   GFR Estimate      >60 mL/min/1.73m2 51 (L)   CD19 B Cells      6 - 27 % <1 (L)   Absolute CD19      107 - 698 cells/uL <1 (L)   Neutrophil Cytoplasmic Antibody      <1:10 <1:10   Neutrophil Cytoplasmic Antibody Pattern       The ANCA IFA is <1:10.  No further testing will be performed.   AST      0 - 45 U/L 16   ALT      0 - 50 U/L 34   Albumin      3.4 - 5.0 g/dL 4.2   CRP Inflammation      0.0 - 8.0 mg/L 9.1 (H)   Sed Rate      0 - 30 mm/hr 15   Vitamin D Deficiency screening      20 - 75 ug/L 36     Component      Latest Ref Rng & Units 1/19/2023   WBC      4.0 - 11.0 10e3/uL 16.1 (H)   RBC Count      3.80 - 5.20 10e6/uL 5.39 (H)   Hemoglobin      11.7 - 15.7 g/dL 13.4   Hematocrit      35.0 - 47.0 % 41.9   MCV      78 - 100 fL 78   MCH      26.5 - 33.0 pg 24.9 (L)   MCHC      31.5 - 36.5 g/dL 32.0   RDW      10.0 - 15.0 % 15.4 (H)   Platelet Count      150 - 450 10e3/uL 383   % Neutrophils      % 79   % Lymphocytes      % 16   % Monocytes      % 4   % Eosinophils      % 1   % Basophils      % 0   % Immature Granulocytes      % 0   NRBCs per 100 WBC      <1 /100 0   Absolute Neutrophils      1.6 - 8.3 10e3/uL 12.6 (H)   Absolute Lymphocytes      0.8 - 5.3 10e3/uL 2.6   Absolute Monocytes      0.0 - 1.3 10e3/uL 0.7   Absolute Eosinophils      0.0 - 0.7 10e3/uL 0.2   Absolute Basophils      0.0 - 0.2 10e3/uL 0.1   Absolute Immature Granulocytes      <=0.4 10e3/uL 0.1   Absolute  NRBCs      10e3/uL 0.0   Sodium      136 - 145 mmol/L 137   Potassium      3.4 - 5.3 mmol/L 4.0   Chloride      98 - 107 mmol/L 98   Carbon Dioxide (CO2)      22 - 29 mmol/L 25   Anion Gap      7 - 15 mmol/L 14   Urea Nitrogen      6.0 - 20.0 mg/dL 23.6 (H)   Creatinine      0.51 - 0.95 mg/dL 1.09 (H)   Calcium      8.6 - 10.0 mg/dL 10.3 (H)   Glucose      70 - 99 mg/dL 232 (H)   Alkaline Phosphatase      35 - 104 U/L 95   AST      10 - 35 U/L 23   ALT      10 - 35 U/L 26   Protein Total      6.4 - 8.3 g/dL 7.7   Albumin      3.5 - 5.2 g/dL 4.9   Bilirubin Total      <=1.2 mg/dL 0.3   GFR Estimate      >60 mL/min/1.73m2 59 (L)   Color Urine      Colorless, Straw, Light Yellow, Yellow Light Yellow   Appearance Urine      Clear Clear   Glucose Urine      Negative mg/dL >=1000 (A)   Bilirubin Urine      Negative Negative   Ketones Urine      Negative mg/dL 10 (A)   Specific Gravity Urine      1.003 - 1.035 1.033   Blood Urine      Negative Negative   pH Urine      5.0 - 7.0 6.0   Protein Albumin Urine      Negative mg/dL Negative   Urobilinogen mg/dL      Normal, 2.0 mg/dL Normal   Nitrite Urine      Negative Negative   Leukocyte Esterase Urine      Negative Negative   Iron      37 - 145 ug/dL 51   Iron Binding Capacity      240 - 430 ug/dL 362   Iron Sat Index      15 - 46 % 14 (L)   Parathyroid Hormone Intact      15 - 65 pg/mL 51   Calcium Ionized Whole Blood      4.4 - 5.2 mg/dL 4.9   Ferritin      11 - 328 ng/mL 70   TSH      0.30 - 4.20 uIU/mL 0.40   3 views cervical spine radiographs 2/10/2023 4:10 PM     History: neck pain; Neck pain     Comparison: MRI cervical spine 4/14/2015.     Findings:  AP, lateral, and odontoid views of the cervical spine were obtained.     Down to the level of C7 is well visualized on the lateral view(s). The  cervicothoracic junction is partially visualized.     There is no acute osseous abnormality. No prevertebral soft tissue  swelling. Normal cervical lordosis is maintained.        Moderate loss of intervertebral disc height at C6-7. Mild loss of disc  height at C5-6. Additional multilevel degenerative changes including  endplate osteophytosis and uncinate hypertrophy. Dens is obscured by  the overlying maxilla on the odontoid view.     The visualized lung apices are clear.                                                                      Impression:  Multilevel cervical degenerative changes, greatest at C6-7.     I have personally reviewed the examination and initial interpretation  and I agree with the findings.     TASHI KELLY MD      Component      Latest Ref Rng & Units 2/10/2023   WBC      4.0 - 11.0 10e3/uL 11.9 (H)   RBC Count      3.80 - 5.20 10e6/uL 5.30 (H)   Hemoglobin      11.7 - 15.7 g/dL 13.2   Hematocrit      35.0 - 47.0 % 40.7   MCV      78 - 100 fL 77 (L)   MCH      26.5 - 33.0 pg 24.9 (L)   MCHC      31.5 - 36.5 g/dL 32.4   RDW      10.0 - 15.0 % 15.3 (H)   Platelet Count      150 - 450 10e3/uL 415   % Neutrophils      % 69   % Lymphocytes      % 23   % Monocytes      % 6   % Eosinophils      % 2   % Basophils      % 0   % Immature Granulocytes      % 0   NRBCs per 100 WBC      <1 /100 0   Absolute Neutrophils      1.6 - 8.3 10e3/uL 8.1   Absolute Lymphocytes      0.8 - 5.3 10e3/uL 2.7   Absolute Monocytes      0.0 - 1.3 10e3/uL 0.8   Absolute Eosinophils      0.0 - 0.7 10e3/uL 0.2   Absolute Basophils      0.0 - 0.2 10e3/uL 0.0   Absolute Immature Granulocytes      <=0.4 10e3/uL 0.0   Absolute NRBCs      10e3/uL 0.0   Sodium      136 - 145 mmol/L 138   Potassium      3.4 - 5.3 mmol/L 4.1   Chloride      98 - 107 mmol/L 99   Carbon Dioxide (CO2)      22 - 29 mmol/L 23   Anion Gap      7 - 15 mmol/L 16 (H)   Urea Nitrogen      6.0 - 20.0 mg/dL 24.6 (H)   Creatinine      0.51 - 0.95 mg/dL 1.07 (H)   Calcium      8.6 - 10.0 mg/dL 10.5 (H)   Glucose      70 - 99 mg/dL 205 (H)   Alkaline Phosphatase      35 - 104 U/L 86   AST      10 - 35 U/L 26   ALT      10 - 35 U/L 30    Protein Total      6.4 - 8.3 g/dL 7.6   Albumin      3.5 - 5.2 g/dL 4.8   Bilirubin Total      <=1.2 mg/dL 0.2   GFR Estimate      >60 mL/min/1.73m2 61   MPO Marline IgG Instrument Value      <3.5 U/mL 0.8   Myeloperoxidase Antibody IgG      Negative Negative   Proteinase 3 Marline IgG Instrument Value      <2.0 U/mL <1.0   Proteinase 3 Antibody IgG      Negative Negative   IGG      610 - 1,616 mg/dL 680   IGA      84 - 499 mg/dL 197   IGM      35 - 242 mg/dL 30 (L)   CD19 B Cells      6 - 27 % <1 (L)   Absolute CD19      107 - 698 cells/uL <1 (L)   Neutrophil Cytoplasmic Antibody      <1:10 <1:10   Neutrophil Cytoplasmic Antibody Pattern       The ANCA IFA is <1:10.  No further testing will be performed.   % Retic      0.5 - 2.0 % 1.4   Absolute Retic      0.025 - 0.095 10e6/uL 0.073   CRP Inflammation      <5.00 mg/L 6.67 (H)   Sed Rate      0 - 30 mm/hr 12   Lipase      13 - 60 U/L 26     ROS: A comprehensive ROS was done. Positives are per HPI.      HISTORY REVIEW:  Past Medical History:   Diagnosis Date     Abnormal glandular Papanicolaou smear of cervix 1992     Allergic rhinitis     Allergy, airborne subst     Arthritis      ASCVD (arteriosclerotic cardiovascular disease)      Chronic pain      Chronic pancreatitis (H)     idiopathic, Tx: PPI, antioxidants, pancreatic enzymes     Common migraine      Coronary artery disease      Costochondritis      Difficulty in walking(719.7)      Dyspnea on exertion      Ectasia, mammary duct     [facility], persistent nipple discharge     Elevated fasting glucose      Gastro-oesophageal reflux disease      Granulomatosis with polyangiitis (H)      Hernia      History of angina      Hyperlipidemia LDL goal < 100      Hypertension goal BP (blood pressure) < 140/90     Essential hypertension     Iron deficiency anemia      Ischemic cardiomyopathy      Menorrhagia      Migraine headaches      Mild persistent asthma      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 Matias, neg w/u, Rx'd w/prednisone     Ventral hernia, unspecified, without mention of obstruction or gangrene 2012     Past Surgical History:   Procedure Laterality Date     C/SECTION, LOW TRANSVERSE  1996         CARDIAC SURGERY      cardiac stent- recent in 16; 4 other stents     COLONOSCOPY N/A 2023    Procedure: COLONOSCOPY, WITH POLYPECTOMY;  Surgeon: Percy Gross MD;  Location: [facility] OR     DILATION AND CURETTAGE N/A 2016    Procedure: DILATION AND CURETTAGE;  Surgeon: Nahed Butler MD;  [facility]     ENDOMETRIAL SAMPLING (BIOPSY) N/A 2023    Procedure: ENDOMETRIAL BIOPSY;  Surgeon: Nahed Butler MD;  [facility]     ESOPHAGOSCOPY, GASTROSCOPY, DUODENOSCOPY (EGD), COMBINED N/A 2022    Procedure: ESOPHAGOGASTRODUODENOSCOPY, WITH BIOPSY;  Surgeon: Enzo Sesay MD;  [facility]     ESOPHAGOSCOPY, GASTROSCOPY, DUODENOSCOPY (EGD), COMBINED N/A 2023    Procedure: ESOPHAGOGASTRODUODENOSCOPY, WITH BIOPSY;  Surgeon: Percy Gross MD;  Location: [facility] OR     EXAM UNDER ANESTHESIA PELVIC N/A 2023    Procedure: EXAM UNDER ANESTHESIA;  Surgeon: Nahed Butler MD;  [facility]     [facility] UGI ENDOSCOPY W EUS  2008    Dr. Pastrana, possible chronic  pancreatitis, fatty liver     HERNIA REPAIR  2012    [place] [facility] [place]     INSERT INTRAUTERINE DEVICE N/A 2016    Procedure: INSERT INTRAUTERINE DEVICE;  Surgeon: Nahed Butler MD;  [facility]     INT UTERINE FIBRIOD EMBOLIZATION  10/29/2014     LAPAROSCOPIC CHOLECYSTECTOMY  2008    Dr. Arnol GRUBBS TX, CERVICAL  1992    s/p HUMERA, done at [facility] in [place].     ORBITOTOMY Right 03/15/2016    Procedure: ORBITOTOMY;  Surgeon: Myron Cyr MD;  Location: [facility]     ORBITOTOMY Right 2017    Procedure: ORBITOTOMY;  RIGHT ORBITOTOMY AND BIOPSY;  Surgeon: Charis Holbrook MD;  Location: [facility]     REMOVE INTRAUTERINE DEVICE N/A 2023    Procedure: Remove intrauterine device,;  Surgeon: Nahed Butler MD;  [facility]     REPAIR PTOSIS Right 2017    Procedure: REPAIR PTOSIS;  RIGHT UPPER LID PTOSIS REPAIR;  Surgeon: Myron Cyr MD;  [facility]     UPPER GI ENDOSCOPY  10/21/2008    mild gastritis, Dr. Rocky CALDERA ECHO HEART XTHORACIC,COMPLETE, W/O DOPPLER  2004    [facility] WNL, EF 60%     Family History   Problem Relation Age of Onset     Hypertension Mother      Arthritis Mother      Heart Disease Mother         long qt syndrome     Thyroid Disease Mother      C.A.D. Mother      Heart Disease Father 50        heart attack     Cerebrovascular Disease Father      Diabetes Father      Hypertension Father      Depression Father      C.A.D. Father      Neurologic Disorder Sister         migraines     Neurologic Disorder Sister         migraines     Heart Disease Brother 15        MI at 15, 16.      Arthritis Brother         he passed away, had severe arthritis at age 11     C.A.D. Brother      Anesthesia Reaction Son         PONV     Respiratory Son         asthma     Hypertension Maternal Aunt      Diabetes Maternal Uncle      Hypertension Maternal Uncle      Arthritis Maternal Uncle      Eye Disorder Maternal  Uncle         cataracts     Cerebrovascular Disease Maternal Uncle      Family History Negative Other         neg for RA, SLE     Unknown/Adopted No family hx of         unknown neurological issues in her family, mother was adopted     Skin Cancer No family hx of         No known family hx of skin cancer     Deep Vein Thrombosis (DVT) No family hx of      Social History     Socioeconomic History     Marital status: Single     Spouse name: Not on file     Number of children: 1     Years of education: Not on file     Highest education level: Not on file   Occupational History     Employer: NONE    Tobacco Use     Smoking status: Some Days     Current packs/day: 0.00     Average packs/day: 0.2 packs/day for 1 year (0.2 ttl pk-yrs)     Types: Cigarettes     Start date: 2015     Last attempt to quit: 2016     Years since quittin.6     Smokeless tobacco: Never   Vaping Use     Vaping status: Every Day     Substances: Nicotine   Substance and Sexual Activity     Alcohol use: No     Alcohol/week: 0.0 standard drinks of alcohol     Drug use: No     Sexual activity: Not Currently   Other Topics Concern     Parent/sibling w/ CABG, MI or angioplasty before 65F 55M? Yes   Social History Narrative    Balanced Diet - sometimes    Osteoporosis Prevention Measures - Dairy servings per day: 2 servings weekly    Regular Exercise -  Yes Describe walking 4 times a week    Dental Exam - NO    Seatbelts used - Yes    Self Breast Exam - Yes    Abuse: Current or Past (Physical, Sexual or Emotional)- No    Do you have any concerns about STD's -  No    Do you feel safe in your environment - No    Guns stored in the home - No    Sunscreen used - Yes    Lipids -  YES - Date:     Glucose -  YES - Date:     Eye Exam - YES - Date: one year ago    Colon Cancer Screening - No    Hemoccults - NO    Pap Test -  YES - Date: , remote history of LEEP    Mammography - YES - Date: last spring, would like to discuss, needs a  referral to [facility]    DEXA - NO    Immunizations reviewed and up to date - Yes, last td given in 1997 or 1998     Social Determinants of Health     Financial Resource Strain: Low Risk  (2/9/2024)    Financial Resource Strain      Within the past 12 months, have you or your family members you live with been unable to get utilities (heat, electricity) when it was really needed?: No   Food Insecurity: High Risk (2/9/2024)    Food Insecurity      Within the past 12 months, did you worry that your food would run out before you got money to buy more?: Yes      Within the past 12 months, did the food you bought just not last and you didn t have money to get more?: No   Transportation Needs: Low Risk  (2/9/2024)    Transportation Needs      Within the past 12 months, has lack of transportation kept you from medical appointments, getting your medicines, non-medical meetings or appointments, work, or from getting things that you need?: No   Physical Activity: Not on file   Stress: Not on file   Social Connections: Not on file   Interpersonal Safety: Low Risk  (2/9/2024)    Interpersonal Safety      Do you feel physically and emotionally safe where you currently live?: Yes      Within the past 12 months, have you been hit, slapped, kicked or otherwise physically hurt by someone?: No      Within the past 12 months, have you been humiliated or emotionally abused in other ways by your partner or ex-partner?: No   Housing Stability: Low Risk  (2/9/2024)    Housing Stability      Do you have housing? : Yes      Are you worried about losing your housing?: No     Patient Active Problem List   Diagnosis     Seasonal affective disorder (H)     Allergic rhinitis     PCOS (polycystic ovarian syndrome)     Moderate persistent asthma     Chronic pancreatitis (H)     Hypertension goal BP (blood pressure) < 140/90     Common migraine     NSTEMI (non-ST elevated myocardial infarction) (H)     Hyperlipidemia LDL goal <70      ASCVD (arteriosclerotic cardiovascular disease)     GERD (gastroesophageal reflux disease)     Ischemic cardiomyopathy     Hypertensive heart disease     Uterine leiomyoma     Iron deficiency anemia     Costochondritis     Vitamin D deficiency     Breast cancer screening     Spinal stenosis in cervical region     Fibromyalgia     Seronegative rheumatoid arthritis (H)     Type 2 diabetes, HbA1C goal < 8% (H)     Type 2 diabetes mellitus with other specified complication (H)     Hyperlipidemia associated with type 2 diabetes mellitus (H)     Hypertension associated with diabetes (H)     Overweight with body mass index (BMI) 25.0-29.9     Tobacco use disorder     Telogen effluvium     CAD S/P percutaneous coronary angioplasty     Status post coronary angiogram     ANCA-associated vasculitis (H)     Wegener's vasculitis     Type 1 diabetes mellitus with complications (H)     Chest discomfort     Urinary frequency     Abdominal pain, epigastric     Hypokalemia     Leukocytosis, unspecified type     Acute pancreatitis, unspecified complication status, unspecified pancreatitis type       Pregnancy Hx: She is . All miscarriages were in first trimester. H/o OC use in the past. 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    PMHx, FHx, SHx were reviewed, unchanged.    Outpatient Encounter Medications as of 10/11/2024   Medication Sig Dispense Refill     acetaminophen (TYLENOL) 325 MG tablet Take 1-2 tablets (325-650 mg) by mouth every 6 hours as needed for pain (headache) 250 tablet 4     ADVAIR DISKUS 250-50 MCG/ACT inhaler Inhale 1 puff into the lungs 2 times daily       albuterol (2.5 MG/3ML) 0.083% neb solution INL 1 VIAL VIA NEBULIZATION Q 4 TO 6 HOURS PRN  1     albuterol (PROAIR HFA, PROVENTIL HFA, VENTOLIN HFA) 108 (90 BASE) MCG/ACT inhaler Inhale 2 puffs into the lungs every 6 hours as needed for shortness of breath / dyspnea or wheezing 3 Inhaler 1     BANOPHEN 25 MG tablet Take 25 mg by mouth  At Bedtime       blood glucose (NO BRAND SPECIFIED) lancets standard Use to test blood sugar 1-4 times daily or as directed. 400 each 3     blood glucose (NO BRAND SPECIFIED) test strip Use to test blood sugar fasting each am and predinner daily. 250 strip 3     blood glucose monitoring (NO BRAND SPECIFIED) meter device kit Use to test blood sugar 2 times daily or as directed. 1 kit 0     Blood Pressure Monitor KIT 1 each daily Monitor home blood pressure as instructed by physician.  Dispense [place] blood pressure kit. 1 kit 0     calcium carbonate (TUMS) 500 MG chewable tablet Take 3-4 tablets (1,500-2,000 mg) by mouth daily as needed 90 tablet 3     clopidogrel (PLAVIX) 75 MG tablet Take 1 tablet (75 mg) by mouth daily . 30 tablet 6     clotrimazole (MYCELEX) 10 MG lozenge Place 1 lozenge (10 mg) inside cheek 5 times daily 50 lozenge 1     cyanocobalamin (VITAMIN B-12) 1000 MCG tablet Take 1 tablet by mouth daily at 2 pm       cyclobenzaprine (FLEXERIL) 10 MG tablet Take 1 tablet (10 mg) by mouth 2 times daily as needed for muscle spasms 60 tablet 3     cycloSPORINE (RESTASIS) 0.05 % ophthalmic emulsion 1 month supply 11 refills 1 each 11     dicyclomine (BENTYL) 20 MG tablet TAKE 1 TABLET(20 MG) BY MOUTH FOUR TIMES DAILY AS NEEDED. 60 tablet 4     empagliflozin (JARDIANCE) 25 MG TABS tablet Take 1 tablet (25 mg) by mouth daily 90 tablet 2     EPIPEN 2-RIKY 0.3 MG/0.3ML injection INJECT 0.3 MG INTO THE MUSCLE PRF ANAPHYALAXIS  0     esomeprazole (NEXIUM) 40 MG DR capsule Take 1 capsule (40 mg) by mouth 2 times daily Take 30-60 minutes before eating. 180 capsule 0     estradiol (VAGIFEM) 10 MCG TABS vaginal tablet Place 1 tablet (10 mcg) vaginally twice a week 24 tablet 3     fluticasone (FLONASE) 50 MCG/ACT nasal spray Spray 1 spray in nostril as needed       folic acid (FOLVITE) 1 MG tablet TAKE 1 TABLET BY MOUTH EVERY DAY 90 tablet 0     insulin glargine (LANTUS PEN) 100 UNIT/ML pen Inject 56 Units Subcutaneous  every morning Or as directed. 75 mL 1     insulin pen needle (BD PEN NEEDLE MARCK 2ND GEN) 32G X 4 MM miscellaneous USE ONE PEN NEEDLE DAILY OR AS DIRECTED 100 each 2     lisinopril-hydrochlorothiazide (ZESTORETIC) 20-25 MG tablet Take 1 tablet by mouth daily 30 tablet 6     magnesium 250 MG tablet TAKE 1 TABLET(250 MG) BY MOUTH TWICE DAILY 60 tablet 3     Magnesium Oxide 250 MG tablet TAKE 1 TABLET(250 MG) BY MOUTH TWICE DAILY 60 tablet 3     metFORMIN (GLUCOPHAGE XR) 500 MG 24 hr tablet Take 4 tablets (2,000 mg) by mouth daily (with dinner) 360 tablet 3     metoprolol succinate ER (TOPROL XL) 100 MG 24 hr tablet Take 1 tablet (100 mg) by mouth daily 30 tablet 6     Multiple Vitamin (DAILY-GERALD MULTIVITAMIN) TABS TAKE 1 TABLET BY MOUTH EVERY  tablet 3     olopatadine (PATANOL) 0.1 % ophthalmic solution Place 1 drop into both eyes as needed       ondansetron (ZOFRAN ODT) 8 MG ODT tab TAKE 1 TABLET BY MOUTH EVERY 8 HOURS AS NEEDED FOR NAUSEA 20 tablet 1     pravastatin (PRAVACHOL) 40 MG tablet Take 1 tablet (40 mg) by mouth daily 30 tablet 6     pregabalin (LYRICA) 25 MG capsule TAKE 1 CAPSULE BY MOUTH EVERY DAY 30 capsule 1     sennosides (SENOKOT) 8.6 MG tablet 1-2 tabs a day as needed for constipation 60 tablet 1     spironolactone (ALDACTONE) 25 MG tablet Take 1 tablet (25 mg) by mouth daily. May also take 0.5 tablets (12.5 mg) daily as needed (for weight gain). 45 tablet 6     sucralfate (CARAFATE) 1 GM tablet Take 1 tablet (1 g) by mouth 4 times daily 120 tablet 3     traMADol (ULTRAM) 50 MG tablet TAKE 1 TABLET(50 MG) BY MOUTH EVERY 8 HOURS AS NEEDED FOR SEVERE PAIN 60 tablet 3     No facility-administered encounter medications on file as of 10/11/2024.       Allergies   Allergen Reactions     Amoxicillin-Pot Clavulanate      Unknown possible hives and edema     Amoxicillin-Pot Clavulanate      Artificial Sweetner (Informational Only)  Other (See Comments)     Increased headache     Azithromycin       Clavulanic Acid      Diatrizoate Other (See Comments)     Pt wants this listed because she is allergic to shellfish      Imitrex [Triptans]      Severe face/neck/chest tightness and flushing side effects      Penicillins Hives     Unknown      Pork Allergy      Stomach pain, cramping, diarrhea, itching, nausea and headaches     Shellfish Allergy Hives and Swelling     Shellfish-Derived Products      Sulfa Antibiotics Hives and Swelling     Sulfatolamide      Zithromax [Azithromycin Dihydrate] Swelling     Unknown        Ph.E:    /79   Pulse 68   Temp 98.7  F (37.1  C) (Oral)   Wt 73.9 kg (162 lb 14.4 oz)   LMP  (LMP Unknown)   SpO2 98%   BMI 28.86 kg/m      GA: NAD, pleasant  HEENT: nl conj, sclera, EOMI. Mild campos-orbital edema under R eye  Chest: CTAB  CV: no M/R/G, RRR  Abdomen: soft, NT  MSK: no joint tenderness, no synovitis  Skin: no rash, thin hair  Neuro: non focal  Psych: nl affect    Assessment/Plan:    1-Seropositive non-erosive RA (arthritis, AM stiffness, high CRP, RF 26 but re-check neg 3/2015 on HCQ) Dx 5/2013, FMS, new pleuritic CP 3/20-15-5/2015 resolved on steroids. She is on  mg bid since 5/2014. She tolerates it well and it's helping some but not enough to control all her pain. Continued having flare ups of joint pain, could be GPA related. Some pain is due to FMS.My impression is that her arthralgias are a combination of RA/ IA sec GPA, fibromyalgia and chronic pain.     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 Prednisone was not a good option for her given weight gain, diabetes. She tried and did not tolerate MTX, AZA.    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 Repeat MPO was positive in 6/2017.    She is s/p lateral orbitotomy and debulking orbital mass right eye on 9/26/18 with   [facility]ablo and Dr. Valdez at [facility]. 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     She is on rituximab since 12/12/2018 with great response, minor allergic reaction which could be managed by pre-meds and extra steroid dose. Developed high BG and vaginal yeast infections after solumedrol premed. Treated candida with fluconazole. Will decrease solumerdol dose to IV 80 mg prior to receiving rituximab. 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 However, feels Rituximab effects wear off around 4 months. According to ACR guidelines can give every 4-6 months. Pt elected to received this upcoming September which will be approximately 5 months from last dose. Repeat Orbit CT in September 2021 demonstrated improvement.     Last visit in April 2022-- Recommended evusheld given b cell depletion tx as rituximab blocks the response to covid vaccine, she is concerned about side effects. I advised her to discuss with [facility] pharmacist.      1/25/2023: Janine has been ill since Dx of COVID on 12/17/2022. Her most recent labs were reviewed, stable vasculitis labs in 8/2022 with CD19<1, neg vasculitis markers. In 1/2023, no anemia and nl thyroid function test. I ordered vit D as add on. 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 She does need to follow up on abnormal thyroid US and mammogram as well. Our plan for ANCA vasculitis was to give rituximab 1 gram [id number] as benefit did not last 6 months with last rituximab on 9/7/2022, but today we both agreed to give next dose in March after 6 months to let her body heal from COVID. I will see her in person, in early march to determine if it is ok to give another dose of rituxiamb. 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 Also ordered shower chair,  commode per her request given significant fatigue, body pain.    3/9/2023: S/p last rituximab 1 gram on 9/7/2022. 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 Stable labs and eye inflammation.    2-Fat pads. She was seen by endocrinology and cushing was ruled out in 4/2014. Was advised to do f/u for enlarged thyroid/thyroid nodules.  3-Hair loss. Continue with dermatology  4-Chronic pain. F/U with pain clinic  5-Vit D deficiency. Was replaced with vit D 50, 000 units po qwk x 12 wk then 2000 units every day  6-?HCQ toxicity on OCT 7/2021, now off HCQ, could explain increased arthralgia  7- Chronic Headaches. Symptoms worsen after taking zofran. Has received compazine in hospital in the past without adverse effect. Will have patient trial Compazine       3/2023 plan:    Spine referral for abnormal C spine (DJD) in 2/2023.    Rituximab 1 gram one dose only this month    Labs in May 2023    Follow up eye exam 8/2023    Return in person in about 5-6 months       9/6/2023: last rituximab 1 gram one dose for ANCA vasculitis on 3/28/2023, increased arthralgia, will try rituximab at full dose, zanaflex and consider resuming HCQ as last eye exam did rule out HCQ toxicity (HCQ was stopped with concern for possible HCQ toxicity).    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 Will schedule rituximab in Feb, pain mx was discussed. Labs are stable.    S/p rituximab 1 gram on 9/19/2023, 10/3/2023.    Plan:      Rituximab 1 gram every 2 weeks x 2 infusion in Feb for ANCA  vasculitis    Labs with next rituximab infusion    X-rays today    Acupuncture referral    Return in person in about 4 months    No orders of the defined types were placed in this encounter.      6/5/2024: more joint pain, rituximab does not last 6 months, will move up appointment. Will check orbit CT. Stable labs. I asked her PCP to help with pain mx.    Labs today    Move up rituximab 1 gram once from Aug to June 2024, our pharmacist will contact you    CT orbit with no contrast    Return in about 3-4 months in person      Today 10/11/2024:    GPA is stable on rituximab q6 mo, suspect knee pain to be from OA, pain sx to be from neuropathy. Discussed mx, going up on lyrica,s he is concerned about SE like weight gain, but willing to try increasing a little bit. Recommend follow up with PCP for pain mx, acupuncture and LDN could be good options.    Plan:    Rituximab 1 gram every 2 weeks in March 2025       Refer to ortho, knee specialist      Go up on lyrica to 50 mg a day      Return in about 4-5 months (in person)        TT 40 min was spent on date of the encounter doing chart review, history and exam, documentation and further activities as noted above. Any prior notes, outside records, laboratory results, and imaging studies were reviewed if relevant.      The longitudinal plan of care for the diagnosis(es)/condition(s) as documented were addressed during this visit. Due to the added complexity in care, I will continue to support Janine in the subsequent management and with ongoing continuity of care.      Orders Placed This Encounter   Procedures     AST     ALT     Myeloperoxidase and Proteinase 3 Panel     Albumin level     Creatinine     CRP inflammation     UA with Microscopic reflex to Culture     Protein  random urine     Creatinine random urine     Erythrocyte sedimentation rate auto     CD19 B Cell Count     ANCA IgG by IFA with Reflex to Titer     Immunoglobulins A G and M     Orthopedic   Referral     Spine  Referral     CBC with Platelets & Differential        Majo Mckinnon MD          Again, thank you for allowing me to participate in the care of your patient.      Sincerely,    Majo Mckinnon MD

## 2024-10-11 NOTE — PATIENT INSTRUCTIONS
Rituximab 1 gram every 2 weeks in March 2025       Refer to ortho, knee specialist      Go up on lyrica to 50 mg a day      Return in about 4-5 months (in person)

## 2024-10-11 NOTE — PROGRESS NOTES
Rheumatology F/U In Person Visit Note    Last visit note: 6/5/2024    Today's visit date: 10/11/2024    Reason for in person visit: F/U GPA, high risk med use    HPI     Ms. 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 She is s/p tx with HCQ since 5/2014, now off due to abnormal eye exam. On rituximab since 12/20218 with great response. Previously tried and did not tolerate MTX, AZA.    She had lateral orbitotomy and debulking orbital mass right eye on 9/26/18 with Dr. Shah and Dr. Valdez at [facility]. 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 There were no complications according to the op note.    Today 10/11/2024:    -lots of pain affecting feet, legs    -has not left house since June, bottom of feet swell up    -hair is coming out    -rituximab does not help with pain    -has swelling inside mouth    -the other day, R side of face swelled up    -after last rituxiamb, had candidia infection under breast, BG was down    -had sweating fater rituximab    -lyrica is not helping    9/6, 9/26 rituximab 1 gram    6/5/2024:    -reports arm weakness    -has back pain    -continues to have joint pain    -has more headaches, R eye pain    -neuropathy is an issue     1/31/2024:      Nortriptyline was prescribed by another provider, but has not tried it yet    R side of her face started swelling again.    Reports pain over neck, hips, knees and hands.          9/6/2023:    -reports pain/swelling over hands/feet, has more arthritis problem over past year  -gets numbness over toes, bottom of feet, can't see neurology till 1/2024  -walking causes pain in the feet  -gets pain over hips after a trip to [place], it is a new problem  -sometimes gets swelling over R cheek, noticed it yesterday outside with heat, her face was also bright red        3/9/2023: Reports pain/swelling of her hands. S/p last rituximab 1  gram on 9/7/2022.       1/25/2023: Janine is doing a phone visit for follow up, was feeling ill and switched in person to phone visit. She got sick around Thanksgiving, saw her PCP on 12/17, was tested positive for COVID, it took a longtime to feel better, did not take paxlovid as it was already past 2 weeks. Since then, she has not been feeling well. Has headaches, body ache, her eyes especially R eye look pink, they burn and itch a lot. Has sense of smell but can not taste her food. She is very tired, hardly leaves her house. Last time, left house for doctor visit, was tired and sore. Has tingling and pinprick feeling over arms and legs. Has upcoming follow up with PCP on 2/10. She had thyroid US for thyroid nodules. She had abnormal screening mammogram on 1/9, will go back for diagnostic mammogram and US of L breast on 1/31.          08/19/2022  Reports fatigue and headaches. Headache worsens after taking Zofran for nausea. Joint symptoms come and go. Last rituximab on 4/6/22. Rituximab is helpful but feels like it wears off prior to the 6 months. Next appointment scheduled for October 2022. Develops intermittent vaginal yeast infection and thrush related to rising blood glucose. Right eye without symptoms. No new fevers, chills, skin changes. Son is wondering if she takes herbal supplements for headaches will this interact with any of her medications. Scheduled to meet with pharmacist today.        4/22/2022:   Each rituximab infusion causes vaginal yeast infection and thrush related to rise in BG. Her BG has stayed in higher level since last rituximab. Has more ache and pain, myalgia and arthralgia. Recently has had headaches with high BP. Had last rituximab 1000 mg on 4/6.Her R eye is itching and swelling up.  Today, her BG is 177.Has gained 20 lbs since Feb 2022. Had severe pain in her arm after covid vaccine, it took a month to get better.      12/16/2021: Janine presents for follow up. Reports ESOB with cold,  has ongoing joint pain. R eye pain is intermittent.  Reports headaches after rituximab 1 gram on 10/18/2021.  Last week, her R knee was hurting.      8/20/2021: Janine has pain over arms, knees. Some days, feel stiff all day long. Some days can't do stairs. Hard to tell if there is swelling as has more water retention during summer.  Some days, eye swells up like today. No fevers, SOB, CP or cough.  Has rosacea but some days wakes up with heat rash over sun. Her face is peeling.  Sometimes can't lift things or grab things with pain from elbow to hands. Has shoulder and neck pain but this forearm pain is new.  Stopped HCQ in mid July due to concern for potential HCQ toxicity on OCT, her eye exam with Dr. Vernon has been re-scheduled and upcoming on 9/14 to address HCQ toxicity, pain is not worse off HCQ.  Not COVID vaccinated but is cautious.      5/10/2021: Joint ache, knees hurt, R elbow hurts, R arm hurts, R hand hurts. Has lots of swelling in her joints especially hands.    Depending on weather, joints jhurt, sometimes pain is 7/10.  Has problem with taking stairs and balance.  Gets off balance.   R eye gets tinglin, swelling.  Gets out of breath, gets worse with seasonal allergies.  Over past month and half, took nitro more, like 8 times.  No hemooptysis, no hematuria.  Has allergies.      4/21/2021: Had last rituximab 1 gram on 1/19, 2/2/2021. Reports rituximab starts to wear off earlier, has more sx this time. Eye swelling is intermittent. Gets indigestion/ GERD sx with constipation after the infusion.  She reports having asthma, swelling of neck, arms. She thinks that it could be from her vasculitis. She is worried about going down on rituximab dose.   Otherwise unchanged sx, no SOB or hemoptysis or persistent cough, or   Somedays her knees and hips hurt a lot, feel like bone on bone in her knees, especially on changing position.      Component      Latest Ref Rng 2/28/2013 2/28/2013          12:14 PM 12:14 PM    WBC      4.0 - 11.0 10e9/L     RBC Count      3.8 - 5.2 10e12/L     Hemoglobin      11.7 - 15.7 g/dL     Hematocrit      35.0 - 47.0 %     MCV      78 - 100 fl     MCH      26.5 - 33.0 pg     MCHC      31.5 - 36.5 g/dL     RDW      10.0 - 15.0 %     Platelet Count      150 - 450 10e9/L     Specimen Description           Lyme Screen IgG and IgM           Vitamin D Deficiency screening      30 - 75 ug/L     Ferritin      10 - 300 ng/mL     Sed Rate      0 - 20 mm/h     ALLI Screen by EIA      <1.0     Rheumatoid Factor      0 - 14 IU/mL     CK Total      30 - 225 U/L  78   Uric Acid      2.5 - 7.5 mg/dL 6.7      Component      Latest Ref Rng 2/28/2013          12:14 PM   WBC      4.0 - 11.0 10e9/L    RBC Count      3.8 - 5.2 10e12/L    Hemoglobin      11.7 - 15.7 g/dL    Hematocrit      35.0 - 47.0 %    MCV      78 - 100 fl    MCH      26.5 - 33.0 pg    MCHC      31.5 - 36.5 g/dL    RDW      10.0 - 15.0 %    Platelet Count      150 - 450 10e9/L    Specimen Description       Serum   Lyme Screen IgG and IgM       Test value: <0.75....Interpretation: Negative....If you highly suspect Lyme . . .   Vitamin D Deficiency screening      30 - 75 ug/L    Ferritin      10 - 300 ng/mL    Sed Rate      0 - 20 mm/h    ALLI Screen by EIA      <1.0    Rheumatoid Factor      0 - 14 IU/mL    CK Total      30 - 225 U/L    Uric Acid      2.5 - 7.5 mg/dL      Component      Latest Ref Rng 2/28/2013 2/28/2013 2/28/2013 2/28/2013          12:14 PM 12:14 PM 12:14 PM 12:14 PM   WBC      4.0 - 11.0 10e9/L       RBC Count      3.8 - 5.2 10e12/L       Hemoglobin      11.7 - 15.7 g/dL       Hematocrit      35.0 - 47.0 %       MCV      78 - 100 fl       MCH      26.5 - 33.0 pg       MCHC      31.5 - 36.5 g/dL       RDW      10.0 - 15.0 %       Platelet Count      150 - 450 10e9/L       Specimen Description             Lyme Screen IgG and IgM             Vitamin D Deficiency screening      30 - 75 ug/L       Ferritin      10 - 300 ng/mL    10   Sed  Rate      0 - 20 mm/h   23 (H)    ALLI Screen by EIA      <1.0  <1.0 . . .     Rheumatoid Factor      0 - 14 IU/mL 26 (H)      CK Total      30 - 225 U/L       Uric Acid      2.5 - 7.5 mg/dL         5/2013  CBC WITH PLATELETS DIFFERENTIAL       Component Value Range    WBC 11.3 (*) 4.0 - 11.0 10e9/L    RBC Count 4.56  3.8 - 5.2 10e12/L    Hemoglobin 11.1 (*) 11.7 - 15.7 g/dL    Hematocrit 34.3 (*) 35.0 - 47.0 %    MCV 75 (*) 78 - 100 fl    MCH 24.3 (*) 26.5 - 33.0 pg    MCHC 32.4  31.5 - 36.5 g/dL    RDW 16.1 (*) 10.0 - 15.0 %    Platelet Count 315  150 - 450 10e9/L    Diff Method Automated Method      % Neutrophils 71.6  40 - 75 %    % Lymphocytes 20.9  20 - 48 %    % Monocytes 4.3  0 - 12 %    % Eosinophils 2.8  0 - 6 %    % Basophils 0.2  0 - 2 %    % Immature Granulocytes 0.2  0 - 0.4 %    Absolute Neutrophil 8.1  1.6 - 8.3 10e9/L    Absolute Lymphocytes 2.4  0.8 - 5.3 10e9/L    Absolute Monoctyes 0.5  0.0 - 1.3 10e9/L    Absolute Eosinophils 0.3  0.0 - 0.7 10e9/L    Absolute Basophils 0.0  0.0 - 0.2 10e9/L    Abs Immature Granulocytes 0.0  0 - 0.03 10e9/L   AMYLASE       Component Value Range    Amylase 103  30 - 110 U/L   COMPREHENSIVE METABOLIC PANEL       Component Value Range    Sodium 144  133 - 144 mmol/L    Potassium 3.8  3.4 - 5.3 mmol/L    Chloride 105  94 - 109 mmol/L    Carbon Dioxide 23  20 - 32 mmol/L    Anion Gap 17  6 - 17 mmol/L    Glucose 173 (*) 60 - 99 mg/dL    Urea Nitrogen 13  5 - 24 mg/dL    Creatinine 0.83  0.52 - 1.04 mg/dL    GFR Estimate 74  >60 mL/min/1.7m2    GFR Estimate If Black 90  >60 mL/min/1.7m2    Calcium 9.7  8.5 - 10.4 mg/dL    Bilirubin Total 0.4  0.2 - 1.3 mg/dL    Albumin 4.3  3.9 - 5.1 g/dL    Protein Total 7.8  6.8 - 8.8 g/dL    Alkaline Phosphatase 66  40 - 150 U/L    ALT 36  0 - 50 U/L    AST 28  0 - 45 U/L   CK TOTAL       Component Value Range    CK Total 66  30 - 225 U/L   CRP INFLAMMATION       Component Value Range    CRP Inflammation 10.4 (*) 0.0 - 8.0 mg/L    LIPASE       Component Value Range    Lipase 353 (*) 20 - 250 U/L   ERYTHROCYTE SEDIMENTATION RATE AUTO       Component Value Range    Sed Rate 26 (*) 0 - 20 mm/h   ROUTINE UA WITH MICROSCOPIC REFLEX TO CULTURE       Component Value Range    Color Urine Yellow      Appearance Urine Slightly Cloudy      Glucose Urine 30 (*) NEG mg/dL    Bilirubin Urine Negative  NEG    Ketones Urine 5 (*) NEG mg/dL    Specific Gravity Urine 1.026  1.003 - 1.035    Blood Urine Trace (*) NEG    pH Urine 5.0  5.0 - 7.0 pH    Protein Albumin Urine 10 (*) NEG mg/dL    Urobilinogen mg/dL Normal  0.0 - 2.0 mg/dL    Nitrite Urine Negative  NEG    Leukocyte Esterase Urine Negative  NEG    Source Midstream Urine      WBC Urine 1  0 - 2 /HPF    RBC Urine 4 (*) 0 - 2 /HPF    Squamous Epithelial /HPF Urine <1  0 - 1 /HPF    Mucous Urine Present (*) NEG /LPF    Hyaline Casts 3 (*) 0 - 2 /LPF    Calcium Oxalate Moderate (*) NEG /HPF   COMPLEMENT C3       Component Value Range    Complement C3 143  76 - 169 mg/dL   COMPLEMENT C4       Component Value Range    Complement C4 31  15 - 50 mg/dL   HEPATITIS C ANTIBODY       Component Value Range    Hepatitis C Antibody Negative  NEG   CARDIOLIPIN ANTIBODY IGG AND IGM       Component Value Range    Cardiolipin IgG Marline    0 - 15.0 GPL    Value: <15.0      Interpretation:  Negative    Cardiolipin IgM Marline    0 - 12.5 MPL    Value: <12.5      Interpretation:  Negative   LUPUS PANEL       Component Value Range    Lupus Result    NEG    Value: Negative      (Note)      COMMENTS:      The INR is normal.      APTT is normal.  1:2 Mix is not indicated.      DRVVT Screen is normal.      Thrombin time is normal.      NEGATIVE TEST; A LUPUS ANTICOAGULANT WAS NOT DETECTED IN THIS      SPECIMEN WITHIN THE LIMITS OF THE TESTING REPERTOIRE.      If the clinical picture is strongly suggestive of an antiphospholipid      syndrome, recommend anticardiolipin and beta-2-glycoprotein (IgG and      IgM) antibody tests.       Leela Franks M.D.  [phone]      5/2/2013            INR =  0.93 N = 0.86-1.14  (No additional charge)      TT = 15.7 N = 13.0-19.0 sec  (No additional charge)            APTT'S:    Seconds      Reagent =  Stago LA      Normal  =  38      Patient  =  34      1:2 Mix  =  N/A      Reference =  31-43             DILUTE MADELINE VIPER VENOM TEST:      DRVVT Screen Ratio = 0.76 Normal = <1.21         IMMUNOGLOBULIN G SUBCLASSES       Component Value Range    IGG 1030  695 - 1620 mg/dL    IgG1 488  300 - 856 mg/dL    IgG2 325  158 - 761 mg/dL    IgG3 47  24 - 192 mg/dL    IgG4 18  11 - 86 mg/dL   SUNNY ANTIBODY PANEL       Component Value Range    Ribonucleic Protein IgG Antibody 0      Smith Antibody IgG 1      SSA (RO) Antibody IgG 4      SSB (LA) Antibody IgG 0      Scleroderma Antibody IgG 0     BETA 2 GLYCOPROTEIN ANTIBODIES IGG IGM       Component Value Range    Beta-2-Glycopro IgG 1      Beta-2-Glycopro IgM 5     CYCLIC CIT PEPT IGG       Component Value Range    Cyclic Cit Pept IgG/IgA    <20 UNITS    Value: <20      Interpretation:  Negative   DNA DOUBLE STRANDED ANTIBODIES       Component Value Range    DNA-ds    0 - 29 IU/mL    Value: <15      Interpretation:  Negative       CT CHEST PULMONARY EMBOLISM W CONTRAST 5/20/2015 4:57 PM  HISTORY: Pain. SOB. Elevated d-dimer.   TECHNIQUE: 65 mL Isovue 370. Axial images with coronal  reconstructions.  COMPARISON: None.  FINDINGS: Calcified granuloma with surrounding scarring in the  posterolateral left upper lobe. Triangular shaped opacity at the right  lung base in the lateral right middle lobe could represent some  scarring, atelectasis or infiltrate. There is also some scarring or  atelectasis in the posteromedial right lung base. Lungs otherwise  clear.  The pulmonary arteries are well opacified. No CT evidence for acute  pulmonary embolus. No aortic dissection.  Diffuse fatty infiltration of the liver.  IMPRESSION  IMPRESSION:   1. No pulmonary embolus  "identified.  2. Small focus of atelectasis, infiltrate or scarring in the lateral  right middle lobe.  3. Old granulomatous disease.  4. Otherwise negative.  SILVERIO VAZQUEZ MD    Copath Report      Patient Name: FAVIO MARTINEZ   MR#: [id number]   Specimen #: [id number]   Collected: 3/15/2016   Received: 3/15/2016   Reported: 3/17/2016 13:33   Ordering Phy(s): PARVEEN ENRIQUEZ     ORIGINAL REPORT FOLLOWS ADDENDUM  ADDENDUM     TO ORIGINAL REPORT   Status: Signed Out   Date Ordered:3/18/2016   Date Complete:3/18/2016   Date Reported:3/18/2016 12:06   Signed Out By: Marianne Godfrey MD     INTERPRETATION:   This addendum is done to incorporate the results of fungal (GMS) stains   done on the specimen.  Specimen is negative for fungal organisms.  The   original final diagnosis remains unchanged.     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  Negative for malignancy(Please   see microscopic description)     Electronically signed out by:     Marianne Godfrey MD     CLINICAL HISTORY:   right orbital mass     GROSS:   The specimen is received labeled \"right orbital biopsy\" and consists of   red-tan nodule measuring 1.5 x 0.9 x 0.6 cm with one smooth side and   opposite rough side consistent with periosteum.  The specimen is   bisected revealing homogenous pale tan fleshy cut surface.  Touch   preparations are prepared, air dried and fixed, portion of specimen is   submitted in RPMI for possible lymphoma workup [facility],   ([facility], [place], [place] ).  The remainder is entirely submitted.   (Dictated by: Marianne Godfrey MD 3/15/2016 04:45 PM)     MICROSCOPIC:     Specimen consists of portion of lacrimal gland with acute and chronic   inflammation.  Focal area of microabscess formation " associated with   small areas of necrosis are also present.  Inflammation is seen   extending to the periorbital adipose tissue forming panniculitis.   Specimen is negative for malignancy.  Samples sent for immunophenotyping   to [facility], ([facility], [place], [place] ) reveals no evidence   of monoclonality or aberrant antigen expression.  A GMS (fungal) stain   is pending and results will be reported as an addendum.     CPT Codes:   A: 55038-IA9, 95637-HKALU, SOH, 85038-FNQXM, 90852-WDIO     TESTING LAB LOCATION:   [address]  [id number] [phone]     COLLECTION SITE:   [facility]   [facility]            Component      Latest Ref Rng 4/29/2016   Nucleated RBCs      0 /100 0   Absolute Neutrophil      1.6 - 8.3 10e9/L 8.9 (H)   Absolute Lymphocytes      0.8 - 5.3 10e9/L 3.2   Absolute Monocytes      0.0 - 1.3 10e9/L 0.8   Absolute Eosinophils      0.0 - 0.7 10e9/L 0.2   Absolute Basophils      0.0 - 0.2 10e9/L 0.1   Abs Immature Granulocytes      0 - 0.4 10e9/L 0.1   Absolute Nucleated RBC       0.0   IGG      695 - 1620 mg/dL 836   IgG1      300 - 856 mg/dL 280 (L)   IgG2      158 - 761 mg/dL 277   IgG3      24 - 192 mg/dL 35   IgG4      11 - 86 mg/dL 16   RNP Antibody IgG      0.0 - 0.9 AI <0.2 . . .   Smith SUNNY Antibody IgG      0.0 - 0.9 AI <0.2 . . .   SSA (Ro) (SUNNY) Antibody, IgG      0.0 - 0.9 AI <0.2 . . .   SSB (La) (SUNNY) Antibody, IgG      0.0 - 0.9 AI <0.2 . . .   Scleroderma Antibody Scl-70 SUNNY IgG      0.0 - 0.9 AI <0.2 . . .   Cholesterol      <200 mg/dL 154   Triglycerides      <150 mg/dL 220 (H)   HDL Cholesterol      >49 mg/dL 42 (L)   LDL Cholesterol Calculated      <100 mg/dL 67   Non HDL Cholesterol      <130 mg/dL 111   M Tuberculosis Result      NEG Negative   M Tuberculosis Antigen Value       0.00   Albumin      3.4 - 5.0 g/dL 4.3   ALT      0 - 50 U/L 30   AST      0 - 45 U/L 10    Complement C3      76 - 169 mg/dL 157   Complement C4      15 - 50 mg/dL 32   CRP Inflammation      0.0 - 8.0 mg/L <2.9   Sed Rate      0 - 20 mm/h 2   DNA-ds      <10 IU/mL 1   Cyclic Citrullinated Peptide Antibody, IgG      <7 U/mL 1   Rheumatoid Factor      <20 IU/mL <20   Proteinase 3 Antibody IgG      0.0 - 0.9 AI <0.2 . . .   Myeloperoxidase Antibody IgG      0.0 - 0.9 AI 2.5 (H)   Vitamin D Deficiency screening      20 - 75 ug/L 24   Hemoglobin A1C      4.3 - 6.0 % 7.9 (H)   ALLI by IFA IgG       1:40 (A) . . .     Component      Latest Ref Rng & Units 1/16/2021   WBC      4.0 - 11.0 10e9/L    RBC Count      3.8 - 5.2 10e12/L    Hemoglobin      11.7 - 15.7 g/dL    Hematocrit      35.0 - 47.0 %    MCV      78 - 100 fl    MCH      26.5 - 33.0 pg    MCHC      31.5 - 36.5 g/dL    RDW      10.0 - 15.0 %    Platelet Count      150 - 450 10e9/L    Diff Method          % Neutrophils      %    % Lymphocytes      %    % Monocytes      %    % Eosinophils      %    % Basophils      %    % Immature Granulocytes      %    Nucleated RBCs      0 /100    Absolute Neutrophil      1.6 - 8.3 10e9/L    Absolute Lymphocytes      0.8 - 5.3 10e9/L    Absolute Monocytes      0.0 - 1.3 10e9/L    Absolute Eosinophils      0.0 - 0.7 10e9/L    Absolute Basophils      0.0 - 0.2 10e9/L    Abs Immature Granulocytes      0 - 0.4 10e9/L    Absolute Nucleated RBC          IGG      610 - 1,616 mg/dL    IGA      84 - 499 mg/dL    IGM      35 - 242 mg/dL    Creatinine      0.52 - 1.04 mg/dL    GFR Estimate      >60 mL/min/1.73:m2    GFR Estimate If Black      >60 mL/min/1.73:m2    COVID-19 Virus PCR to [facility] - Source       Nasopharyngeal   COVID-19 Virus PCR to [facility] - Result       Not Detected   Neutrophil Cytoplasmic Antibody      <1:10 titer    Neutrophil Cytoplasmic Antibody Pattern          CD19 B Cells      6 - 27 %    Absolute CD19      107 - 698 cells/uL    Myeloperoxidase Antibody IgG      0.0 - 0.9 AI    Proteinase 3 Antibody IgG       0.0 - 0.9 AI    Vitamin D Deficiency screening      20 - 75 ug/L    Sed Rate      0 - 30 mm/h    CRP Inflammation      0.0 - 8.0 mg/L    Albumin      3.4 - 5.0 g/dL    ALT      0 - 50 U/L    AST      0 - 45 U/L      Component      Latest Ref Rng & Units 5/7/2021   WBC      4.0 - 11.0 10e9/L 8.9   RBC Count      3.8 - 5.2 10e12/L 4.89   Hemoglobin      11.7 - 15.7 g/dL 12.7   Hematocrit      35.0 - 47.0 % 39.7   MCV      78 - 100 fl 81   MCH      26.5 - 33.0 pg 26.0 (L)   MCHC      31.5 - 36.5 g/dL 32.0   RDW      10.0 - 15.0 % 13.7   Platelet Count      150 - 450 10e9/L 336   Diff Method       Automated Method   % Neutrophils      % 65.3   % Lymphocytes      % 20.7   % Monocytes      % 7.8   % Eosinophils      % 5.3   % Basophils      % 0.7   % Immature Granulocytes      % 0.2   Nucleated RBCs      0 /100 0   Absolute Neutrophil      1.6 - 8.3 10e9/L 5.8   Absolute Lymphocytes      0.8 - 5.3 10e9/L 1.8   Absolute Monocytes      0.0 - 1.3 10e9/L 0.7   Absolute Eosinophils      0.0 - 0.7 10e9/L 0.5   Absolute Basophils      0.0 - 0.2 10e9/L 0.1   Abs Immature Granulocytes      0 - 0.4 10e9/L 0.0   Absolute Nucleated RBC       0.0   IGG      610 - 1,616 mg/dL 649   IGA      84 - 499 mg/dL 199   IGM      35 - 242 mg/dL 36   Creatinine      0.52 - 1.04 mg/dL 0.96   GFR Estimate      >60 mL/min/1.73:m2 66   GFR Estimate If Black      >60 mL/min/1.73:m2 77   COVID-19 Virus PCR to [facility] - Source          COVID-19 Virus PCR to [facility] - Result          Neutrophil Cytoplasmic Antibody      <1:10 titer <1:10   Neutrophil Cytoplasmic Antibody Pattern       The ANCA IFA is <1:10.  No further testing will be performed.   CD19 B Cells      6 - 27 % <1 (L)   Absolute CD19      107 - 698 cells/uL <1 (L)   Myeloperoxidase Antibody IgG      0.0 - 0.9 AI 1.1 (H)   Proteinase 3 Antibody IgG      0.0 - 0.9 AI <0.2   Vitamin D Deficiency screening      20 - 75 ug/L 41   Sed Rate      0 - 30 mm/h 9   CRP Inflammation      0.0 - 8.0  mg/L <2.9   Albumin      3.4 - 5.0 g/dL 4.1   ALT      0 - 50 U/L 28   AST      0 - 45 U/L 18     Lab on 07/08/2022   Component Date Value Ref Range Status    Sodium 07/08/2022 143  133 - 144 mmol/L Final    Potassium 07/08/2022 3.9  3.4 - 5.3 mmol/L Final    Chloride 07/08/2022 108  94 - 109 mmol/L Final    Carbon Dioxide (CO2) 07/08/2022 25  20 - 32 mmol/L Final    Anion Gap 07/08/2022 10  3 - 14 mmol/L Final    Urea Nitrogen 07/08/2022 17  7 - 30 mg/dL Final    Creatinine 07/08/2022 1.01  0.52 - 1.04 mg/dL Final    Calcium 07/08/2022 9.3  8.5 - 10.1 mg/dL Final    Glucose 07/08/2022 103 (A) 70 - 99 mg/dL Final    GFR Estimate 07/08/2022 65  >60 mL/min/1.73m2 Final    Effective December 21, 2021 eGFRcr in adults is calculated using the 2021 CKD-EPI creatinine equation which includes age and gender (Ryan et al., NEJ, DOI: 10.1056/IDEMjq8542096)    WBC Count 07/08/2022 12.0 (A) 4.0 - 11.0 10e3/uL Final    RBC Count 07/08/2022 4.94  3.80 - 5.20 10e6/uL Final    Hemoglobin 07/08/2022 12.6  11.7 - 15.7 g/dL Final    Hematocrit 07/08/2022 39.6  35.0 - 47.0 % Final    MCV 07/08/2022 80  78 - 100 fL Final    MCH 07/08/2022 25.5 (A) 26.5 - 33.0 pg Final    MCHC 07/08/2022 31.8  31.5 - 36.5 g/dL Final    RDW 07/08/2022 13.6  10.0 - 15.0 % Final    Platelet Count 07/08/2022 350  150 - 450 10e3/uL Final    % Neutrophils 07/08/2022 66  % Final    % Lymphocytes 07/08/2022 22  % Final    % Monocytes 07/08/2022 9  % Final    % Eosinophils 07/08/2022 3  % Final    % Basophils 07/08/2022 0  % Final    % Immature Granulocytes 07/08/2022 0  % Final    NRBCs per 100 WBC 07/08/2022 0  <1 /100 Final    Absolute Neutrophils 07/08/2022 7.9  1.6 - 8.3 10e3/uL Final    Absolute Lymphocytes 07/08/2022 2.7  0.8 - 5.3 10e3/uL Final    Absolute Monocytes 07/08/2022 1.1  0.0 - 1.3 10e3/uL Final    Absolute Eosinophils 07/08/2022 0.3  0.0 - 0.7 10e3/uL Final    Absolute Basophils 07/08/2022 0.1  0.0 - 0.2 10e3/uL Final    Absolute Immature  Granulocytes 07/08/2022 0.0  <=0.4 10e3/uL Final    Absolute NRBCs 07/08/2022 0.0  10e3/uL Final     Component      Latest Ref Rng & Units 8/19/2022   WBC      4.0 - 11.0 10e3/uL 12.6 (H)   RBC Count      3.80 - 5.20 10e6/uL 5.06   Hemoglobin      11.7 - 15.7 g/dL 12.8   Hematocrit      35.0 - 47.0 % 40.4   MCV      78 - 100 fL 80   MCH      26.5 - 33.0 pg 25.3 (L)   MCHC      31.5 - 36.5 g/dL 31.7   RDW      10.0 - 15.0 % 14.1   Platelet Count      150 - 450 10e3/uL 387   % Neutrophils      % 71   % Lymphocytes      % 20   % Monocytes      % 6   % Eosinophils      % 3   % Basophils      % 0   % Immature Granulocytes      % 0   NRBCs per 100 WBC      <1 /100 0   Absolute Neutrophils      1.6 - 8.3 10e3/uL 8.8 (H)   Absolute Lymphocytes      0.8 - 5.3 10e3/uL 2.5   Absolute Monocytes      0.0 - 1.3 10e3/uL 0.8   Absolute Eosinophils      0.0 - 0.7 10e3/uL 0.4   Absolute Basophils      0.0 - 0.2 10e3/uL 0.1   Absolute Immature Granulocytes      <=0.4 10e3/uL 0.1   Absolute NRBCs      10e3/uL 0.0   Color Urine      Colorless, Straw, Light Yellow, Yellow Straw   Appearance Urine      Clear Clear   Glucose Urine      Negative mg/dL 1000 (A)   Bilirubin Urine      Negative Negative   Ketones Urine      Negative mg/dL Negative   Specific Gravity Urine      1.003 - 1.035 1.020   Blood Urine      Negative Negative   pH Urine      5.0 - 7.0 5.0   Protein Albumin Urine      Negative mg/dL Negative   Urobilinogen mg/dL      Normal, 2.0 mg/dL Normal   Nitrite Urine      Negative Negative   Leukocyte Esterase Urine      Negative Negative   RBC Urine      <=2 /HPF 1   WBC Urine      <=5 /HPF 5   Squamous Epithelial /HPF Urine      <=1 /HPF <1   MPO Marline IgG Instrument Value      <3.5 U/mL 0.7   Myeloperoxidase Antibody IgG      Negative Negative   Proteinase 3 Marline IgG Instrument Value      <2.0 U/mL <1.0   Proteinase 3 Antibody IgG      Negative Negative   Protein Random Urine      g/L 0.11   Protein Total Urine g/gr Creatinine       0.00 - 0.20 g/g Cr 0.09   Creatinine Urine      mg/dL 128   IGG      610 - 1,616 mg/dL 641   IGA      84 - 499 mg/dL 204   IGM      35 - 242 mg/dL 32 (L)   Creatinine      0.52 - 1.04 mg/dL 1.24 (H)   GFR Estimate      >60 mL/min/1.73m2 51 (L)   CD19 B Cells      6 - 27 % <1 (L)   Absolute CD19      107 - 698 cells/uL <1 (L)   Neutrophil Cytoplasmic Antibody      <1:10 <1:10   Neutrophil Cytoplasmic Antibody Pattern       The ANCA IFA is <1:10.  No further testing will be performed.   AST      0 - 45 U/L 16   ALT      0 - 50 U/L 34   Albumin      3.4 - 5.0 g/dL 4.2   CRP Inflammation      0.0 - 8.0 mg/L 9.1 (H)   Sed Rate      0 - 30 mm/hr 15   Vitamin D Deficiency screening      20 - 75 ug/L 36     Component      Latest Ref Rng & Units 1/19/2023   WBC      4.0 - 11.0 10e3/uL 16.1 (H)   RBC Count      3.80 - 5.20 10e6/uL 5.39 (H)   Hemoglobin      11.7 - 15.7 g/dL 13.4   Hematocrit      35.0 - 47.0 % 41.9   MCV      78 - 100 fL 78   MCH      26.5 - 33.0 pg 24.9 (L)   MCHC      31.5 - 36.5 g/dL 32.0   RDW      10.0 - 15.0 % 15.4 (H)   Platelet Count      150 - 450 10e3/uL 383   % Neutrophils      % 79   % Lymphocytes      % 16   % Monocytes      % 4   % Eosinophils      % 1   % Basophils      % 0   % Immature Granulocytes      % 0   NRBCs per 100 WBC      <1 /100 0   Absolute Neutrophils      1.6 - 8.3 10e3/uL 12.6 (H)   Absolute Lymphocytes      0.8 - 5.3 10e3/uL 2.6   Absolute Monocytes      0.0 - 1.3 10e3/uL 0.7   Absolute Eosinophils      0.0 - 0.7 10e3/uL 0.2   Absolute Basophils      0.0 - 0.2 10e3/uL 0.1   Absolute Immature Granulocytes      <=0.4 10e3/uL 0.1   Absolute NRBCs      10e3/uL 0.0   Sodium      136 - 145 mmol/L 137   Potassium      3.4 - 5.3 mmol/L 4.0   Chloride      98 - 107 mmol/L 98   Carbon Dioxide (CO2)      22 - 29 mmol/L 25   Anion Gap      7 - 15 mmol/L 14   Urea Nitrogen      6.0 - 20.0 mg/dL 23.6 (H)   Creatinine      0.51 - 0.95 mg/dL 1.09 (H)   Calcium      8.6 - 10.0 mg/dL 10.3 (H)    Glucose      70 - 99 mg/dL 232 (H)   Alkaline Phosphatase      35 - 104 U/L 95   AST      10 - 35 U/L 23   ALT      10 - 35 U/L 26   Protein Total      6.4 - 8.3 g/dL 7.7   Albumin      3.5 - 5.2 g/dL 4.9   Bilirubin Total      <=1.2 mg/dL 0.3   GFR Estimate      >60 mL/min/1.73m2 59 (L)   Color Urine      Colorless, Straw, Light Yellow, Yellow Light Yellow   Appearance Urine      Clear Clear   Glucose Urine      Negative mg/dL >=1000 (A)   Bilirubin Urine      Negative Negative   Ketones Urine      Negative mg/dL 10 (A)   Specific Gravity Urine      1.003 - 1.035 1.033   Blood Urine      Negative Negative   pH Urine      5.0 - 7.0 6.0   Protein Albumin Urine      Negative mg/dL Negative   Urobilinogen mg/dL      Normal, 2.0 mg/dL Normal   Nitrite Urine      Negative Negative   Leukocyte Esterase Urine      Negative Negative   Iron      37 - 145 ug/dL 51   Iron Binding Capacity      240 - 430 ug/dL 362   Iron Sat Index      15 - 46 % 14 (L)   Parathyroid Hormone Intact      15 - 65 pg/mL 51   Calcium Ionized Whole Blood      4.4 - 5.2 mg/dL 4.9   Ferritin      11 - 328 ng/mL 70   TSH      0.30 - 4.20 uIU/mL 0.40   3 views cervical spine radiographs 2/10/2023 4:10 PM     History: neck pain; Neck pain     Comparison: MRI cervical spine 4/14/2015.     Findings:  AP, lateral, and odontoid views of the cervical spine were obtained.     Down to the level of C7 is well visualized on the lateral view(s). The  cervicothoracic junction is partially visualized.     There is no acute osseous abnormality. No prevertebral soft tissue  swelling. Normal cervical lordosis is maintained.       Moderate loss of intervertebral disc height at C6-7. Mild loss of disc  height at C5-6. Additional multilevel degenerative changes including  endplate osteophytosis and uncinate hypertrophy. Dens is obscured by  the overlying maxilla on the odontoid view.     The visualized lung apices are clear.                                                                       Impression:  Multilevel cervical degenerative changes, greatest at C6-7.     I have personally reviewed the examination and initial interpretation  and I agree with the findings.     TASHI KELLY MD      Component      Latest Ref Rng & Units 2/10/2023   WBC      4.0 - 11.0 10e3/uL 11.9 (H)   RBC Count      3.80 - 5.20 10e6/uL 5.30 (H)   Hemoglobin      11.7 - 15.7 g/dL 13.2   Hematocrit      35.0 - 47.0 % 40.7   MCV      78 - 100 fL 77 (L)   MCH      26.5 - 33.0 pg 24.9 (L)   MCHC      31.5 - 36.5 g/dL 32.4   RDW      10.0 - 15.0 % 15.3 (H)   Platelet Count      150 - 450 10e3/uL 415   % Neutrophils      % 69   % Lymphocytes      % 23   % Monocytes      % 6   % Eosinophils      % 2   % Basophils      % 0   % Immature Granulocytes      % 0   NRBCs per 100 WBC      <1 /100 0   Absolute Neutrophils      1.6 - 8.3 10e3/uL 8.1   Absolute Lymphocytes      0.8 - 5.3 10e3/uL 2.7   Absolute Monocytes      0.0 - 1.3 10e3/uL 0.8   Absolute Eosinophils      0.0 - 0.7 10e3/uL 0.2   Absolute Basophils      0.0 - 0.2 10e3/uL 0.0   Absolute Immature Granulocytes      <=0.4 10e3/uL 0.0   Absolute NRBCs      10e3/uL 0.0   Sodium      136 - 145 mmol/L 138   Potassium      3.4 - 5.3 mmol/L 4.1   Chloride      98 - 107 mmol/L 99   Carbon Dioxide (CO2)      22 - 29 mmol/L 23   Anion Gap      7 - 15 mmol/L 16 (H)   Urea Nitrogen      6.0 - 20.0 mg/dL 24.6 (H)   Creatinine      0.51 - 0.95 mg/dL 1.07 (H)   Calcium      8.6 - 10.0 mg/dL 10.5 (H)   Glucose      70 - 99 mg/dL 205 (H)   Alkaline Phosphatase      35 - 104 U/L 86   AST      10 - 35 U/L 26   ALT      10 - 35 U/L 30   Protein Total      6.4 - 8.3 g/dL 7.6   Albumin      3.5 - 5.2 g/dL 4.8   Bilirubin Total      <=1.2 mg/dL 0.2   GFR Estimate      >60 mL/min/1.73m2 61   MPO Marline IgG Instrument Value      <3.5 U/mL 0.8   Myeloperoxidase Antibody IgG      Negative Negative   Proteinase 3 Marline IgG Instrument Value      <2.0 U/mL <1.0   Proteinase 3 Antibody  IgG      Negative Negative   IGG      610 - 1,616 mg/dL 680   IGA      84 - 499 mg/dL 197   IGM      35 - 242 mg/dL 30 (L)   CD19 B Cells      6 - 27 % <1 (L)   Absolute CD19      107 - 698 cells/uL <1 (L)   Neutrophil Cytoplasmic Antibody      <1:10 <1:10   Neutrophil Cytoplasmic Antibody Pattern       The ANCA IFA is <1:10.  No further testing will be performed.   % Retic      0.5 - 2.0 % 1.4   Absolute Retic      0.025 - 0.095 10e6/uL 0.073   CRP Inflammation      <5.00 mg/L 6.67 (H)   Sed Rate      0 - 30 mm/hr 12   Lipase      13 - 60 U/L 26     ROS: A comprehensive ROS was done. Positives are per HPI.      HISTORY REVIEW:  Past Medical History:   Diagnosis Date    Abnormal glandular Papanicolaou smear of cervix 1992    Allergic rhinitis     Allergy, airborne subst    Arthritis     ASCVD (arteriosclerotic cardiovascular disease)     Chronic pain     Chronic pancreatitis (H)     idiopathic, Tx: PPI, antioxidants, pancreatic enzymes    Common migraine     Coronary artery disease     Costochondritis     Difficulty in walking(719.7)     Dyspnea on exertion     Ectasia, mammary duct     [facility], persistent nipple discharge    Elevated fasting glucose     Gastro-oesophageal reflux disease     Granulomatosis with polyangiitis (H)     Hernia     History of angina     Hyperlipidemia LDL goal < 100     Hypertension goal BP (blood pressure) < 140/90     Essential hypertension    Iron deficiency anemia     Ischemic cardiomyopathy     Menorrhagia     Migraine headaches     Mild persistent asthma     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 Matias, neg w/u, Rx'd w/prednisone    Ventral hernia, unspecified, without mention of obstruction or gangrene 2012     Past Surgical History:   Procedure Laterality Date    C/SECTION, LOW TRANSVERSE  1996        CARDIAC SURGERY      cardiac stent- recent in 16; 4 other stents    COLONOSCOPY N/A 2023    Procedure: COLONOSCOPY, WITH POLYPECTOMY;  Surgeon: Percy Gross MD;  Location: [facility] OR    DILATION AND CURETTAGE N/A 2016    Procedure: DILATION AND CURETTAGE;  Surgeon: Nahed Butler MD;  [facility]    ENDOMETRIAL SAMPLING (BIOPSY) N/A 2023    Procedure: ENDOMETRIAL BIOPSY;  Surgeon: Nahed Butler MD;  [facility]    ESOPHAGOSCOPY, GASTROSCOPY, DUODENOSCOPY (EGD), COMBINED N/A 2022    Procedure: ESOPHAGOGASTRODUODENOSCOPY, WITH BIOPSY;  Surgeon: Enzo Sesay MD;  [facility]: [facility]    ESOPHAGOSCOPY, GASTROSCOPY, DUODENOSCOPY (EGD), COMBINED N/A 2023    Procedure: ESOPHAGOGASTRODUODENOSCOPY, WITH BIOPSY;  Surgeon: Percy Gross MD;  Location: [facility] OR    EXAM UNDER ANESTHESIA PELVIC N/A 2023    Procedure: EXAM UNDER ANESTHESIA;  Surgeon: Nahed Butler MD;  [facility]    HC UGI ENDOSCOPY W EUS  2008    Dr. Pastrana, possible chronic pancreatitis, fatty liver    [place] [facility] [place] INTRAUTERINE DEVICE N/A 2016    Procedure: INSERT INTRAUTERINE DEVICE;  Surgeon: Nahed Butler MD;  [facility]    INT UTERINE FIBRIOD EMBOLIZATION  10/29/2014    LAPAROSCOPIC CHOLECYSTECTOMY  2008    Dr. Arnol GRUBBS TX, CERVICAL  1992    s/p LEEP, done at [facility] in [place].     ORBITOTOMY Right 03/15/2016    Procedure: ORBITOTOMY;  Surgeon: Myron Cyr MD;  Location: [facility]    ORBITOTOMY Right 2017    Procedure: ORBITOTOMY;  RIGHT ORBITOTOMY AND BIOPSY;  Surgeon: Charis Holbrook MD;  Location: [facility]    REMOVE INTRAUTERINE DEVICE N/A 2023    Procedure: Remove intrauterine device,;  Surgeon: Nahed Butler MD;  [facility]    REPAIR PTOSIS Right 2017    Procedure: REPAIR PTOSIS;  RIGHT UPPER LID PTOSIS REPAIR;  Surgeon: Myron Cyr MD;  Location: [facility]    UPPER GI ENDOSCOPY  10/21/2008    mild gastritis, Dr. Rocky [facility] ECHO HEART XTHORACIC,COMPLETE, W/O DOPPLER  2004    [facility] WNL, EF 60%     Family History   Problem Relation Age of Onset    Hypertension Mother     Arthritis Mother     Heart Disease Mother         long qt syndrome    Thyroid Disease Mother     C.A.D. Mother     Heart Disease Father 50        heart attack    Cerebrovascular Disease Father     Diabetes Father     Hypertension Father     Depression Father     C.A.D. Father     Neurologic Disorder Sister         migraines    Neurologic Disorder Sister         migraines    Heart Disease Brother 15        MI at 15, 16.     Arthritis Brother         he passed away, had severe arthritis at age 11    C.A.D. Brother     Anesthesia Reaction Son         PONV    Respiratory Son         asthma    Hypertension Maternal Aunt     Diabetes Maternal Uncle     Hypertension Maternal Uncle     Arthritis Maternal Uncle     Eye Disorder Maternal Uncle         cataracts    Cerebrovascular Disease Maternal Uncle     Family History Negative Other         neg for RA, SLE    Unknown/Adopted No family hx of         unknown neurological issues in her family, mother was adopted    Skin Cancer No family hx of         No known family hx of skin cancer    Deep Vein Thrombosis (DVT) No family hx of      Social History     Socioeconomic History    Marital status: Single     Spouse name:  Not on file    Number of children: 1    Years of education: Not on file    Highest education level: Not on file   Occupational History     Employer: NONE    Tobacco Use    Smoking status: Some Days     Current packs/day: 0.00     Average packs/day: 0.2 packs/day for 1 year (0.2 ttl pk-yrs)     Types: Cigarettes     Start date: 2015     Last attempt to quit: 2016     Years since quittin.6    Smokeless tobacco: Never   Vaping Use    Vaping status: Every Day    Substances: Nicotine   Substance and Sexual Activity    Alcohol use: No     Alcohol/week: 0.0 standard drinks of alcohol    Drug use: No    Sexual activity: Not Currently   Other Topics Concern    Parent/sibling w/ CABG, MI or angioplasty before 65F 55M? Yes   Social History Narrative    Balanced Diet - sometimes    Osteoporosis Prevention Measures - Dairy servings per day: 2 servings weekly    Regular Exercise -  Yes Describe walking 4 times a week    Dental Exam - NO    Seatbelts used - Yes    Self Breast Exam - Yes    Abuse: Current or Past (Physical, Sexual or Emotional)- No    Do you have any concerns about STD's -  No    Do you feel safe in your environment - No    Guns stored in the home - No    Sunscreen used - Yes    Lipids -  YES - Date:     Glucose -  YES - Date:     Eye Exam - YES - Date: one year ago    Colon Cancer Screening - No    Hemoccults - NO    Pap Test -  YES - Date: , remote history of LEEP    Mammography - YES - Date: last spring, would like to discuss, needs a referral to [facility]    DEXA - NO    Immunizations reviewed and up to date - Yes, last td given in  or      Social Determinants of Health     Financial Resource Strain: Low Risk  (2024)    Financial Resource Strain     Within the past 12 months, have you or your family members you live with been unable to get utilities (heat, electricity) when it was really needed?: No   Food Insecurity: High Risk (2024)    Food  Insecurity     Within the past 12 months, did you worry that your food would run out before you got money to buy more?: Yes     Within the past 12 months, did the food you bought just not last and you didn t have money to get more?: No   Transportation Needs: Low Risk  (2/9/2024)    Transportation Needs     Within the past 12 months, has lack of transportation kept you from medical appointments, getting your medicines, non-medical meetings or appointments, work, or from getting things that you need?: No   Physical Activity: Not on file   Stress: Not on file   Social Connections: Not on file   Interpersonal Safety: Low Risk  (2/9/2024)    Interpersonal Safety     Do you feel physically and emotionally safe where you currently live?: Yes     Within the past 12 months, have you been hit, slapped, kicked or otherwise physically hurt by someone?: No     Within the past 12 months, have you been humiliated or emotionally abused in other ways by your partner or ex-partner?: No   Housing Stability: Low Risk  (2/9/2024)    Housing Stability     Do you have housing? : Yes     Are you worried about losing your housing?: No     Patient Active Problem List   Diagnosis    Seasonal affective disorder (H)    Allergic rhinitis    PCOS (polycystic ovarian syndrome)    Moderate persistent asthma    Chronic pancreatitis (H)    Hypertension goal BP (blood pressure) < 140/90    Common migraine    NSTEMI (non-ST elevated myocardial infarction) (H)    Hyperlipidemia LDL goal <70    ASCVD (arteriosclerotic cardiovascular disease)    GERD (gastroesophageal reflux disease)    Ischemic cardiomyopathy    Hypertensive heart disease    Uterine leiomyoma    Iron deficiency anemia    Costochondritis    Vitamin D deficiency    Breast cancer screening    Spinal stenosis in cervical region    Fibromyalgia    Seronegative rheumatoid arthritis (H)    Type 2 diabetes, HbA1C goal < 8% (H)    Type 2 diabetes mellitus with other specified complication (H)     Hyperlipidemia associated with type 2 diabetes mellitus (H)    Hypertension associated with diabetes (H)    Overweight with body mass index (BMI) 25.0-29.9    Tobacco use disorder    Telogen effluvium    CAD S/P percutaneous coronary angioplasty    Status post coronary angiogram    ANCA-associated vasculitis (H)    Wegener's vasculitis    Type 1 diabetes mellitus with complications (H)    Chest discomfort    Urinary frequency    Abdominal pain, epigastric    Hypokalemia    Leukocytosis, unspecified type    Acute pancreatitis, unspecified complication status, unspecified pancreatitis type       Pregnancy Hx: She is . All miscarriages were in first trimester. H/o OC use in the past. 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    PMHx, FHx, SHx were reviewed, unchanged.    Outpatient Encounter Medications as of 10/11/2024   Medication Sig Dispense Refill    acetaminophen (TYLENOL) 325 MG tablet Take 1-2 tablets (325-650 mg) by mouth every 6 hours as needed for pain (headache) 250 tablet 4    ADVAIR DISKUS 250-50 MCG/ACT inhaler Inhale 1 puff into the lungs 2 times daily      albuterol (2.5 MG/3ML) 0.083% neb solution INL 1 VIAL VIA NEBULIZATION Q 4 TO 6 HOURS PRN  1    albuterol (PROAIR HFA, PROVENTIL HFA, VENTOLIN HFA) 108 (90 BASE) MCG/ACT inhaler Inhale 2 puffs into the lungs every 6 hours as needed for shortness of breath / dyspnea or wheezing 3 Inhaler 1    BANOPHEN 25 MG tablet Take 25 mg by mouth At Bedtime      blood glucose (NO BRAND SPECIFIED) lancets standard Use to test blood sugar 1-4 times daily or as directed. 400 each 3    blood glucose (NO BRAND SPECIFIED) test strip Use to test blood sugar fasting each am and predinner daily. 250 strip 3    blood glucose monitoring (NO BRAND SPECIFIED) meter device kit Use to test blood sugar 2 times daily or as directed. 1 kit 0    Blood Pressure Monitor KIT 1 each daily Monitor home blood pressure as instructed by physician.  Dispense [place] blood  pressure kit. 1 kit 0    calcium carbonate (TUMS) 500 MG chewable tablet Take 3-4 tablets (1,500-2,000 mg) by mouth daily as needed 90 tablet 3    clopidogrel (PLAVIX) 75 MG tablet Take 1 tablet (75 mg) by mouth daily . 30 tablet 6    clotrimazole (MYCELEX) 10 MG lozenge Place 1 lozenge (10 mg) inside cheek 5 times daily 50 lozenge 1    cyanocobalamin (VITAMIN B-12) 1000 MCG tablet Take 1 tablet by mouth daily at 2 pm      cyclobenzaprine (FLEXERIL) 10 MG tablet Take 1 tablet (10 mg) by mouth 2 times daily as needed for muscle spasms 60 tablet 3    cycloSPORINE (RESTASIS) 0.05 % ophthalmic emulsion 1 month supply 11 refills 1 each 11    dicyclomine (BENTYL) 20 MG tablet TAKE 1 TABLET(20 MG) BY MOUTH FOUR TIMES DAILY AS NEEDED. 60 tablet 4    empagliflozin (JARDIANCE) 25 MG TABS tablet Take 1 tablet (25 mg) by mouth daily 90 tablet 2    EPIPEN 2-RIKY 0.3 MG/0.3ML injection INJECT 0.3 MG INTO THE MUSCLE PRF ANAPHYALAXIS  0    esomeprazole (NEXIUM) 40 MG DR capsule Take 1 capsule (40 mg) by mouth 2 times daily Take 30-60 minutes before eating. 180 capsule 0    estradiol (VAGIFEM) 10 MCG TABS vaginal tablet Place 1 tablet (10 mcg) vaginally twice a week 24 tablet 3    fluticasone (FLONASE) 50 MCG/ACT nasal spray Spray 1 spray in nostril as needed      folic acid (FOLVITE) 1 MG tablet TAKE 1 TABLET BY MOUTH EVERY DAY 90 tablet 0    insulin glargine (LANTUS PEN) 100 UNIT/ML pen Inject 56 Units Subcutaneous every morning Or as directed. 75 mL 1    insulin pen needle (BD PEN NEEDLE MARCK 2ND GEN) 32G X 4 MM miscellaneous USE ONE PEN NEEDLE DAILY OR AS DIRECTED 100 each 2    lisinopril-hydrochlorothiazide (ZESTORETIC) 20-25 MG tablet Take 1 tablet by mouth daily 30 tablet 6    magnesium 250 MG tablet TAKE 1 TABLET(250 MG) BY MOUTH TWICE DAILY 60 tablet 3    Magnesium Oxide 250 MG tablet TAKE 1 TABLET(250 MG) BY MOUTH TWICE DAILY 60 tablet 3    metFORMIN (GLUCOPHAGE XR) 500 MG 24 hr tablet Take 4 tablets (2,000 mg) by mouth  daily (with dinner) 360 tablet 3    metoprolol succinate ER (TOPROL XL) 100 MG 24 hr tablet Take 1 tablet (100 mg) by mouth daily 30 tablet 6    Multiple Vitamin (DAILY-GERALD MULTIVITAMIN) TABS TAKE 1 TABLET BY MOUTH EVERY  tablet 3    olopatadine (PATANOL) 0.1 % ophthalmic solution Place 1 drop into both eyes as needed      ondansetron (ZOFRAN ODT) 8 MG ODT tab TAKE 1 TABLET BY MOUTH EVERY 8 HOURS AS NEEDED FOR NAUSEA 20 tablet 1    pravastatin (PRAVACHOL) 40 MG tablet Take 1 tablet (40 mg) by mouth daily 30 tablet 6    pregabalin (LYRICA) 25 MG capsule TAKE 1 CAPSULE BY MOUTH EVERY DAY 30 capsule 1    sennosides (SENOKOT) 8.6 MG tablet 1-2 tabs a day as needed for constipation 60 tablet 1    spironolactone (ALDACTONE) 25 MG tablet Take 1 tablet (25 mg) by mouth daily. May also take 0.5 tablets (12.5 mg) daily as needed (for weight gain). 45 tablet 6    sucralfate (CARAFATE) 1 GM tablet Take 1 tablet (1 g) by mouth 4 times daily 120 tablet 3    traMADol (ULTRAM) 50 MG tablet TAKE 1 TABLET(50 MG) BY MOUTH EVERY 8 HOURS AS NEEDED FOR SEVERE PAIN 60 tablet 3     No facility-administered encounter medications on file as of 10/11/2024.       Allergies   Allergen Reactions    Amoxicillin-Pot Clavulanate      Unknown possible hives and edema    Amoxicillin-Pot Clavulanate     Artificial Sweetner (Informational Only)  Other (See Comments)     Increased headache    Azithromycin     Clavulanic Acid     Diatrizoate Other (See Comments)     Pt wants this listed because she is allergic to shellfish     Imitrex [Triptans]      Severe face/neck/chest tightness and flushing side effects     Penicillins Hives     Unknown     Pork Allergy      Stomach pain, cramping, diarrhea, itching, nausea and headaches    Shellfish Allergy Hives and Swelling    Shellfish-Derived Products     Sulfa Antibiotics Hives and Swelling    Sulfatolamide     Zithromax [Azithromycin Dihydrate] Swelling     Unknown        Ph.E:    /79   Pulse  68   Temp 98.7  F (37.1  C) (Oral)   Wt 73.9 kg (162 lb 14.4 oz)   LMP  (LMP Unknown)   SpO2 98%   BMI 28.86 kg/m      GA: NAD, pleasant  HEENT: nl conj, sclera, EOMI. Mild campos-orbital edema under R eye  Chest: CTAB  CV: no M/R/G, RRR  Abdomen: soft, NT  MSK: no joint tenderness, no synovitis  Skin: no rash, thin hair  Neuro: non focal  Psych: nl affect    Assessment/Plan:    1-Seropositive non-erosive RA (arthritis, AM stiffness, high CRP, RF 26 but re-check neg 3/2015 on HCQ) Dx 5/2013, FMS, new pleuritic CP 3/20-15-5/2015 resolved on steroids. She is on  mg bid since 5/2014. She tolerates it well and it's helping some but not enough to control all her pain. Continued having flare ups of joint pain, could be GPA related. Some pain is due to FMS.My impression is that her arthralgias are a combination of RA/ IA sec GPA, fibromyalgia and chronic pain.     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 Prednisone was not a good option for her given weight gain, diabetes. She tried and did not tolerate MTX, AZA.    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 Repeat MPO was positive in 6/2017.    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 Shah and Dr. Valdez at [place]. 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     She is on rituximab since 12/12/2018 with great response, minor allergic reaction which could be managed by pre-meds and extra steroid dose. Developed high BG and vaginal yeast infections after solumedrol premed. Treated candida with fluconazole. Will decrease solumerdol dose to IV 80 mg prior to receiving rituximab. 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 However, feels Rituximab effects wear off around 4 months. According to ACR guidelines can give every 4-6 months. Pt  elected to received this upcoming September which will be approximately 5 months from last dose. Repeat Orbit CT in September 2021 demonstrated improvement.     Last visit in April 2022-- Recommended marie given b cell depletion tx as rituximab blocks the response to covid vaccine, she is concerned about side effects. I advised her to discuss with [facility] pharmacist.      1/25/2023: Janine has been ill since Dx of COVID on 12/17/2022. Her most recent labs were reviewed, stable vasculitis labs in 8/2022 with CD19<1, neg vasculitis markers. In 1/2023, no anemia and nl thyroid function test. I ordered vit D as add on. 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 She does need to follow up on abnormal thyroid US and mammogram as well. 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 I will see her in person, in early march to determine if it is ok to give another dose of rituxiamb. 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 Also ordered shower chair, commode per her request given significant fatigue, body pain.    3/9/2023: S/p last rituximab 1 gram on 9/7/2022. 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 Stable labs and eye inflammation.    2-Fat pads. She was seen by endocrinology and cushing was ruled out in 4/2014. Was advised to do f/u for enlarged thyroid/thyroid nodules.  3-Hair loss. Continue with dermatology  4-Chronic pain. F/U with pain clinic  5-Vit D deficiency. Was replaced with vit D 50, 000 units po qwk x 12 wk then 2000 units  every day  6-?HCQ toxicity on OCT 7/2021, now off HCQ, could explain increased arthralgia  7- Chronic Headaches. Symptoms worsen after taking zofran. Has received compazine in hospital in the past without adverse effect. Will have patient trial Compazine       3/2023 plan:    Spine referral for abnormal C spine (DJD) in 2/2023.    Rituximab 1 gram one dose only this month    Labs in May 2023    Follow up eye exam 8/2023    Return in person in about 5-6 months       9/6/2023: last rituximab 1 gram one dose for ANCA vasculitis on 3/28/2023, increased arthralgia, will try rituximab at full dose, zanaflex and consider resuming HCQ as last eye exam did rule out HCQ toxicity (HCQ was stopped with concern for possible HCQ toxicity).    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 Will schedule rituximab in Feb, pain mx was discussed. Labs are stable.    S/p rituximab 1 gram on 9/19/2023, 10/3/2023.    Plan:      Rituximab 1 gram every 2 weeks x 2 infusion in Feb for ANCA vasculitis    Labs with next rituximab infusion    X-rays today    Acupuncture referral    Return in person in about 4 months    No orders of the defined types were placed in this encounter.      6/5/2024: more joint pain, rituximab does not last 6 months, will move up appointment. Will check orbit CT. Stable labs. I asked her PCP to help with pain mx.    Labs today    Move up rituximab 1 gram once from Aug to June 2024, our pharmacist will contact you    CT orbit with no contrast    Return in about 3-4 months in person      Today 10/11/2024:    GPA is  stable on rituximab q6 mo, suspect knee pain to be from OA, pain sx to be from neuropathy. Discussed mx, going up on lyrica,s he is concerned about SE like weight gain, but willing to try increasing a little bit. Recommend follow up with PCP for pain mx, acupuncture and LDN could be good options.    Plan:    Rituximab 1 gram every 2 weeks in March 2025       Refer to ortho, knee specialist      Go up on lyrica to 50 mg a day      Return in about 4-5 months (in person)        TT 40 min was spent on date of the encounter doing chart review, history and exam, documentation and further activities as noted above. Any prior notes, outside records, laboratory results, and imaging studies were reviewed if relevant.      The longitudinal plan of care for the diagnosis(es)/condition(s) as documented were addressed during this visit. Due to the added complexity in care, I will continue to support Janine in the subsequent management and with ongoing continuity of care.      Orders Placed This Encounter   Procedures    AST    ALT    Myeloperoxidase and Proteinase 3 Panel    Albumin level    Creatinine    CRP inflammation    UA with Microscopic reflex to Culture    Protein  random urine    Creatinine random urine    Erythrocyte sedimentation rate auto    CD19 B Cell Count    ANCA IgG by IFA with Reflex to Titer    Immunoglobulins A G and M    Orthopedic  Referral    Spine  Referral    CBC with Platelets & Differential        Majo Mckinnon MD

## 2024-10-12 LAB
CD19 B CELL COMMENT: ABNORMAL
CD19 CELLS # BLD: 1 CELLS/UL (ref 107–698)
CD19 CELLS NFR BLD: <1 % (ref 6–27)

## 2024-10-14 LAB
ANCA AB PATTERN SER IF-IMP: NORMAL
C-ANCA TITR SER IF: NORMAL {TITER}
IGA SERPL-MCNC: 157 MG/DL (ref 84–499)
IGG SERPL-MCNC: 582 MG/DL (ref 610–1616)
IGM SERPL-MCNC: 25 MG/DL (ref 35–242)

## 2024-10-15 ENCOUNTER — TELEPHONE (OUTPATIENT)
Dept: RHEUMATOLOGY | Facility: CLINIC | Age: 59
End: 2024-10-15
Payer: COMMERCIAL

## 2024-10-15 DIAGNOSIS — R11.0 NAUSEA: ICD-10-CM

## 2024-10-15 LAB
MYELOPEROXIDASE AB SER IA-ACNC: 1.3 U/ML
MYELOPEROXIDASE AB SER IA-ACNC: NEGATIVE
PROTEINASE3 AB SER IA-ACNC: <1 U/ML
PROTEINASE3 AB SER IA-ACNC: NEGATIVE

## 2024-10-15 NOTE — TELEPHONE ENCOUNTER
Left Voicemail (1st Attempt) for the patient to call back and schedule the following:    Appointment type: Return Rheumatology  Provider: Dr. Mckinnon  Return date: Rescheduling 2/14 appt to next available and add to wait list  Specialty phone number: [phone]  Additional appointment(s) needed: NA  Additonal Notes: Rescheduling 2/14 appt to next available and add to wait list

## 2024-10-16 ENCOUNTER — TELEPHONE (OUTPATIENT)
Dept: RHEUMATOLOGY | Facility: CLINIC | Age: 59
End: 2024-10-16
Payer: COMMERCIAL

## 2024-10-18 RX ORDER — ONDANSETRON 8 MG/1
8 TABLET, ORALLY DISINTEGRATING ORAL EVERY 8 HOURS PRN
Qty: 20 TABLET | Refills: 1 | Status: SHIPPED | OUTPATIENT
Start: 2024-10-18

## 2024-10-21 NOTE — TELEPHONE ENCOUNTER
Sent Mychart (1st Attempt) and Left Voicemail (2nd Attempt) for the patient to call back and schedule the following:    Appointment type: Return Rheumatology  Provider: Dr. Mckinnon  Return date: Next available and add to wait list  Specialty phone number: [phone]  Additional appointment(s) needed: NA  Additonal Notes: 2nd reschedule attempt. Cancelling appt as max attempts made. Schedule pt to next available and add to wait list. Sending letter as no MyC.

## 2024-11-07 NOTE — TELEPHONE ENCOUNTER
DIAGNOSIS: Knee effusion, right [M25.461]  Acute pain of right knee [M25.561]    APPOINTMENT DATE: 11/13/2024   NOTES STATUS DETAILS   OFFICE NOTE from referring provider Internal Majo Mckinnon MD in  ADULT RHEUMATOLOGY    OFFICE NOTE from other specialist Internal    MEDICATION LIST Internal    MRI Internal MRI Knee Right 7/11/2024   XRAYS (IMAGES & REPORTS) Internal Xray Knee Right 6/12/2024 more in PACS

## 2024-11-08 DIAGNOSIS — G62.9 NEUROPATHY: ICD-10-CM

## 2024-11-08 RX ORDER — PREGABALIN 25 MG/1
50 CAPSULE ORAL DAILY
Qty: 180 CAPSULE | Refills: 1 | OUTPATIENT
Start: 2024-11-08

## 2024-11-08 NOTE — TELEPHONE ENCOUNTER
current Rx and rx rf entered as:pregabalin (LYRICA) 25 MG capsule,2 cap every day   Last rx:180 c w/1rf  on 10/11/2024 Rx by  @ [facility] [phone], Pharm called     fax request information: was to Dr. Solano, for 1 cap every day    FYI to [facility] clinic:   Dr. Mckinnon has changed order for Pregablin ( increased to 50 mg daily)  previous rx was by Dr. Solano  pregabalin (LYRICA) 25 MG capsule (Discontinued)   30 capsule 1 7/31/2024 10/11/2024 No  Sig: TAKE 1 CAPSULE BY MOUTH EVERY DAY  Prescribing Provider's NPI: [id number]  Kash Solano  Discontinued Reorder (No AVS) Majo Mckinnon MD 10/11/24 1262

## 2024-11-12 ENCOUNTER — TELEPHONE (OUTPATIENT)
Dept: ANESTHESIOLOGY | Facility: CLINIC | Age: 59
End: 2024-11-12
Payer: COMMERCIAL

## 2024-11-12 NOTE — TELEPHONE ENCOUNTER
Patient confirmed scheduled appointment:     Date: 11/21/24  Time: 12 PM  Visit type: New Med Spine Physician  Visit mode: In Person  Provider: Dr. Linda Fuchs  [place] [facility] [place]:  Rescheduled from 12/18/24

## 2024-11-13 ENCOUNTER — PRE VISIT (OUTPATIENT)
Dept: ORTHOPEDICS | Facility: CLINIC | Age: 59
End: 2024-11-13

## 2024-11-13 ENCOUNTER — OFFICE VISIT (OUTPATIENT)
Dept: ORTHOPEDICS | Facility: CLINIC | Age: 59
End: 2024-11-13
Attending: INTERNAL MEDICINE
Payer: COMMERCIAL

## 2024-11-13 DIAGNOSIS — M48.062 LUMBAR STENOSIS WITH NEUROGENIC CLAUDICATION: Primary | ICD-10-CM

## 2024-11-13 DIAGNOSIS — M06.00 SERONEGATIVE RHEUMATOID ARTHRITIS (H): ICD-10-CM

## 2024-11-13 NOTE — LETTER
11/13/2024      RE: Janine Cornell  [address]  [facility] [address]     Dear Colleague,    Thank you for referring your patient, Janine Cornell, to the [facility] [place]. Please see a copy of my visit note below.    Patient has noted over the last year a tendency to have discomfort in the bilateral lower extremities.  She indicates that if she walks more than a few blocks she will have aching discomfort she feels in the bilateral thighs, bilateral knees, bilateral shins and down to the feet.  With long periods of sitting she will feel a burning sensation in the bilateral feet, left greater than right.  She will have recurrent tingling sensation in the bilateral lower extremities with long periods of standing and walking.  She believes the symptoms began in 2020 but have been worsening over subsequent years.  She has an appointment coming up with neurosurgery within the next week because of MRI findings of the lumbar spine that shows severe lumbar central stenosis, in the setting of a congenitally narrowed spinal canal.    Patient in addition has had episodes of knee swelling, in the setting of a diagnosis of seropositive rheumatoid arthritis.      Patient follows with Dr. Mckinnon for seropositive rheumatoid arthritis.  Patient has been on rituximab since 2018.  Most recent clinic visit 10/11/2024 reviewed by me.  She was having at that time continued flareups of joint pain that was felt to be a combination of inflammatory arthritis, fibromyalgia and chronic pain.  It was felt that her most recent presentation of knee discomfort could be due to osteoarthritis.  She was referred to orthotics to consider a knee injection.  Her Lyrica was increased.  She was encouraged to consider alternative pain control options such as acupuncture through her primary provider.  Patient did see integrative medicine in 2019 for acupuncture.    Standing x-rays of the bilateral knees 1/31/2024 showed  no significant degenerative change or bony abnormality.    MRI of the right knee 7/11/2024 consistent with focal area of grade 3 cartilage loss along the medial femoral condyle, without associated subchondral edema.  Intact ACL and PCL.  Medial meniscus intact.  Free edge tearing of the right lateral meniscus without displaced fragment.  Osteoarthritis greatest along the lateral patellar facet with areas of full-thickness cartilage loss.      Imaging studies below reviewed by me:  2 views right and left knee radiographs 1/31/2024 1:58 PM     History: knee pain; Inflammatory arthritis; Pain in both knees,  unspecified chronicity; Pain in both knees, unspecified chronicity      Comparison: None     Findings:     AP and lateral views of the right and left knee were obtained.      Left: No acute osseous abnormality.  No joint effusion.     Osteophytes with preservation of joint spaces.      Soft tissue is unremarkable.     Right:  No acute osseous abnormality.  No joint effusion.     Osteophytes with preservation of joint spaces.      Soft tissue is unremarkable.                                                                      Impression:  1. No acute osseous abnormality.  2. No substantial degenerative change.     GAURAV ACOSTA MD (Joe)           EXAM: XR KNEE RIGHT 3 VIEWS (supine xrays)  LOCATION: [facility]  DATE: 6/12/2024     INDICATION: Knee pain and swelling.  COMPARISON: 1/31/2024.                                                                      IMPRESSION: Moderate knee joint effusion. Normal joint alignment and spacing. No fracture or bone lesion.          EXAMINATION: MRI of the right knee without contrast     DATE:  7/11/2024     HISTORY: Right knee pain     TECHNIQUE: Multiplanar, multisequence MR imaging of the right knee was  obtained using standard sequences in 3 orthogonal planes without the  use of intravenous or intra-articular gadolinium  contrast.     Comparison: Comparison radiographs dated 6/12/2024 were reviewed,  which demonstrated no acute bone abnormality.     FINDINGS:     In the medial compartment, the medial meniscus is intact. There is a  focal area of grade 3 cartilage loss along the medial femoral condyle  without subchondral edema.     In the lateral compartment, there is free edge fraying of the body of  the lateral meniscus. There is a focal area of grade 2/3 cartilage  loss along the lateral tibial plateau without subchondral edema.     In the patellofemoral compartment, there are focal areas of  full-thickness cartilage loss with subtle subchondral edema, greatest  along the lateral patellar facet.      The anterior and posterior cruciate ligaments are intact.     The tibial collateral ligament is intact.      The anterior iliotibial band, fibular collateral ligament, biceps  femoris tendon, and popliteus tendons are intact.     There is a moderate size joint effusion. No popliteal cyst. No joint  bodies. Probable tiny cartilage rest along the medial aspect of the  right distal femur.     The extensor mechanism is intact and normal in appearance.                                                                       IMPRESSION:  1. Moderate size right knee joint effusion.      2. Free edge tearing of the body of the right knee lateral meniscus  without displaced fragment.     3. Osteoarthrosis in the right knee, as above, greatest along the  lateral patellar facet with areas of full-thickness cartilage loss.     4. The anterior and posterior cruciate ligaments, medial and lateral  supporting structures, and medial meniscus are intact.     STACY FRY MD         MRI lumbar spine 7/11/2024  Impression:   1. Attention on the nomenclature There are 6 type lumbar vertebra  including a lumbarized S1.   2. Congenital spinal canal stenosis.  3. Multilevel thoracolumbar spondylosis most significant at L3-4 with  moderate to severe spinal  canal stenosis. No high-grade neural  foraminal stenosis at any level.          PMH:  Past Medical History:   Diagnosis Date     Abnormal glandular Papanicolaou smear of cervix      Allergic rhinitis     Allergy, airborne subst     Arthritis      ASCVD (arteriosclerotic cardiovascular disease)      Chronic pain      Chronic pancreatitis (H)     idiopathic, Tx: PPI, antioxidants, pancreatic enzymes     Common migraine      Coronary artery disease      Costochondritis      Difficulty in walking(719.7)      Dyspnea on exertion      Ectasia, mammary duct     [facility], [facility] nipple discharge     Elevated fasting glucose      Gastro-oesophageal reflux disease      Granulomatosis with polyangiitis (H)      Hernia      History of angina      Hyperlipidemia LDL goal < 100      Hypertension goal BP (blood pressure) < 140/90     Essential hypertension     Iron deficiency anemia      Ischemic cardiomyopathy      Menorrhagia      Migraine headaches      Mild persistent asthma      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 Matias, neg w/u, Rx'd w/prednisone     Ventral hernia, unspecified, without mention of obstruction or gangrene 2012     Past Surgical History:   Procedure Laterality Date     C/SECTION, LOW TRANSVERSE   1996           CARDIAC SURGERY         cardiac stent- recent in 2/9/16; 4 other stents     COLONOSCOPY N/A 5/25/2023     Procedure: COLONOSCOPY, WITH POLYPECTOMY;  Surgeon: Percy Gross MD;  Location: [facility] OR     DILATION AND CURETTAGE N/A 07/06/2016     Procedure: DILATION AND CURETTAGE;  Surgeon: Nahed Butler MD;  [facility]: [facility]     ENDOMETRIAL SAMPLING (BIOPSY) N/A 4/28/2023     Procedure: ENDOMETRIAL BIOPSY;  Surgeon: Nahed Butler MD;  [facility]     ESOPHAGOSCOPY, GASTROSCOPY, DUODENOSCOPY (EGD), COMBINED N/A 03/31/2022     Procedure: ESOPHAGOGASTRODUODENOSCOPY, WITH BIOPSY;  Surgeon: Enzo Sesay MD;  Location: [facility]     ESOPHAGOSCOPY, GASTROSCOPY, DUODENOSCOPY (EGD), COMBINED N/A 5/25/2023     Procedure: ESOPHAGOGASTRODUODENOSCOPY, WITH BIOPSY;  Surgeon: Percy Gross MD;  Location: [facility] OR     EXAM UNDER ANESTHESIA PELVIC N/A 4/28/2023     Procedure: EXAM UNDER ANESTHESIA;  Surgeon: Nahed Butler MD;  [facility]     HC UGI ENDOSCOPY W EUS   11/07/2008     Dr. Pastrana, possible chronic pancreatitis, fatty liver     HERNIA REPAIR   12/01/2012     done at [facility]     INSERT INTRAUTERINE DEVICE N/A 07/06/2016     Procedure: INSERT INTRAUTERINE DEVICE;  Surgeon: Nahed Butler MD;  [facility]     INT UTERINE FIBRIOD EMBOLIZATION   10/29/2014     LAPAROSCOPIC CHOLECYSTECTOMY   05/28/2008     Dr. Arnol GRUBBS TX, CERVICAL   1992     s/p LEEP, done at [facility] in [place].     ORBITOTOMY Right 03/15/2016     Procedure: ORBITOTOMY;  Surgeon: Myron Cyr MD;  [facility]     ORBITOTOMY Right 08/04/2017     Procedure: ORBITOTOMY;  RIGHT ORBITOTOMY AND BIOPSY;  Surgeon: Charis Holbrook MD;  [facility]     REMOVE INTRAUTERINE DEVICE N/A 4/28/2023     Procedure: Remove intrauterine device,;  Surgeon: Nahed Butler MD;  [facility]     REPAIR PTOSIS Right 11/17/2017     Procedure: REPAIR PTOSIS;  RIGHT UPPER LID PTOSIS  REPAIR;  Surgeon: Myron Cyr MD;  Location: [facility]     UPPER GI ENDOSCOPY   10/21/2008     mild gastritis, Dr. Rocky [facility] ECHO HEART XTHORACIC,COMPLETE, W/O DOPPLER   02/04/2004     [facility] WNL, EF 60%       Active problem list:  Patient Active Problem List   Diagnosis     Seasonal affective disorder (H)     Allergic rhinitis     PCOS (polycystic ovarian syndrome)     Moderate persistent asthma     Chronic pancreatitis (H)     Hypertension goal BP (blood pressure) < 140/90     Common migraine     NSTEMI (non-ST elevated myocardial infarction) (H)     Hyperlipidemia LDL goal <70     ASCVD (arteriosclerotic cardiovascular disease)     GERD (gastroesophageal reflux disease)     Ischemic cardiomyopathy     Hypertensive heart disease     Uterine leiomyoma     Iron deficiency anemia     Costochondritis     Vitamin D deficiency     Breast cancer screening     Spinal stenosis in cervical region     Fibromyalgia     Seronegative rheumatoid arthritis (H)     Type 2 diabetes, HbA1C goal < 8% (H)     Type 2 diabetes mellitus with other specified complication (H)     Hyperlipidemia associated with type 2 diabetes mellitus (H)     Hypertension associated with diabetes (H)     Overweight with body mass index (BMI) 25.0-29.9     Tobacco use disorder     Telogen effluvium     CAD S/P percutaneous coronary angioplasty     Status post coronary angiogram     ANCA-associated vasculitis (H)     Wegener's vasculitis     Type 1 diabetes mellitus with complications (H)     Chest discomfort     Urinary frequency     Abdominal pain, epigastric     Hypokalemia     Leukocytosis, unspecified type     Acute pancreatitis, unspecified complication status, unspecified pancreatitis type       FH:  Family History   Problem Relation Age of Onset     Hypertension Mother      Arthritis Mother      Heart Disease Mother         long qt syndrome     Thyroid Disease Mother      C.A.D. Mother      Heart Disease Father 50        heart  attack     Cerebrovascular Disease Father      Diabetes Father      Hypertension Father      Depression Father      C.A.D. Father      Neurologic Disorder Sister         migraines     Neurologic Disorder Sister         migraines     Heart Disease Brother 15        MI at 15, 16.      Arthritis Brother         he passed away, had severe arthritis at age 11     C.A.D. Brother      Anesthesia Reaction Son         PONV     Respiratory Son         asthma     Hypertension Maternal Aunt      Diabetes Maternal Uncle      Hypertension Maternal Uncle      Arthritis Maternal Uncle      Eye Disorder Maternal Uncle         cataracts     Cerebrovascular Disease Maternal Uncle      Family History Negative Other         neg for RA, SLE     Unknown/Adopted No family hx of         unknown neurological issues in her family, mother was adopted     Skin Cancer No family hx of         No known family hx of skin cancer     Deep Vein Thrombosis (DVT) No family hx of        SH:  Social History     Socioeconomic History     Marital status: Single     Spouse name: Not on file     Number of children: 1     Years of education: Not on file     Highest education level: Not on file   Occupational History     Employer: NONE    Tobacco Use     Smoking status: Some Days     Current packs/day: 0.00     Average packs/day: 0.2 packs/day for 1 year (0.2 ttl pk-yrs)     Types: Cigarettes     Start date: 2015     Last attempt to quit: 2016     Years since quittin.7     Smokeless tobacco: Never   Vaping Use     Vaping status: Every Day     Substances: Nicotine   Substance and Sexual Activity     Alcohol use: No     Alcohol/week: 0.0 standard drinks of alcohol     Drug use: No     Sexual activity: Not Currently   Other Topics Concern     Parent/sibling w/ CABG, MI or angioplasty before 65F 55M? Yes   Social History Narrative    Balanced Diet - sometimes    Osteoporosis Prevention Measures - Dairy servings per day: 2 servings weekly     Regular Exercise -  Yes Describe walking 4 times a week    Dental Exam - NO    Seatbelts used - Yes    Self Breast Exam - Yes    Abuse: Current or Past (Physical, Sexual or Emotional)- No    Do you have any concerns about STD's -  No    Do you feel safe in your environment - No    Guns stored in the home - No    Sunscreen used - Yes    Lipids -  YES - Date: 053102    Glucose -  YES - Date: 012804    Eye Exam - YES - Date: one year ago    Colon Cancer Screening - No    Hemoccults - NO    Pap Test -  YES - Date: 070904, remote history of LEEP    Mammography - YES - Date: last spring, would like to discuss, needs a referral to [facility]    DEXA - NO    Immunizations reviewed and up to date - Yes, last td given in 1997 or 1998     Social Drivers of Health     Financial Resource Strain: Low Risk  (2/9/2024)    Financial Resource Strain      Within the past 12 months, have you or your family members you live with been unable to get utilities (heat, electricity) when it was really needed?: No   Food Insecurity: High Risk (2/9/2024)    Food Insecurity      Within the past 12 months, did you worry that your food would run out before you got money to buy more?: Yes      Within the past 12 months, did the food you bought just not last and you didn t have money to get more?: No   Transportation Needs: Low Risk  (2/9/2024)    Transportation Needs      Within the past 12 months, has lack of transportation kept you from medical appointments, getting your medicines, non-medical meetings or appointments, work, or from getting things that you need?: No   Physical Activity: Not on file   Stress: Not on file   Social Connections: Not on file   Interpersonal Safety: Low Risk  (2/9/2024)    Interpersonal Safety      Do you feel physically and emotionally safe where you currently live?: Yes      Within the past 12 months, have you been hit, slapped, kicked or otherwise physically hurt by someone?: No      Within the past  12 months, have you been humiliated or emotionally abused in other ways by your partner or ex-partner?: No   Housing Stability: Low Risk  (2/9/2024)    Housing Stability      Do you have housing? : Yes      Are you worried about losing your housing?: No       MEDS:  See EMR, reviewed  ALL:  See EMR, reviewed    REVIEW OF SYSTEMS:  CONSTITUTIONAL:NEGATIVE for fever, chills, change in weight  INTEGUMENTARY/SKIN: NEGATIVE for worrisome rashes, moles or lesions  EYES: NEGATIVE for vision changes or irritation  ENT/MOUTH: NEGATIVE for ear, mouth and throat problems  RESP:NEGATIVE for significant cough or SOB  BREAST: NEGATIVE for masses, tenderness or discharge  CV: NEGATIVE for chest pain, palpitations or peripheral edema  GI: NEGATIVE for nausea, abdominal pain, heartburn, or change in bowel habits  :NEGATIVE for frequency, dysuria, or hematuria  :NEGATIVE for frequency, dysuria, or hematuria  NEURO: NEGATIVE for weakness, dizziness or paresthesias  ENDOCRINE: NEGATIVE for temperature intolerance, skin/hair changes  HEME/ALLERGY/IMMUNE: NEGATIVE for bleeding problems  PSYCHIATRIC: NEGATIVE for changes in mood or affect          Objective: In the seated position on the table a straight leg raise is negative bilaterally.  5 out of 5 strength at hip, knee, ankle and foot.  The bilateral knees reveal no palpable effusion.  She describes discomfort in the bilateral knees to light touch and allows a partial exam only .  She has global discomfort about the bilateral knee patellar facets, medial joint line, lateral joint lines to light touch.  I can flex and extend both knees through full range of motion and there is no swelling in the popliteal space or tenderness in the bilateral calves.  Overlying skin is normal, no signs of cellulitis.      I personally viewed the patient the previous x-rays of her knees standing that showed no degenerative change but the MRI of the right knee that shows mild chondromalacia changes and  degenerative meniscal changes.  We discussed that these are not surgical findings but can be associated with weightbearing knee pain.  We discussed the findings of her lumbar spine MRI with the help of diagram online.      a: Lumbar spinal stenosis, severe.  History of rheumatoid arthritis.  Mild right-sided DJD seen on MRI only.  Degenerative meniscal findings right knee    Plan: We discussed the option of an empiric right-sided knee cortisone shot to see if it improves some of her symptoms.  However her story sounds more consistent with lumbar spinal stenosis.  We discussed options for physical therapy for the right knee, declined for now.  We discussed the option of a pull-up knee sleeve.  Patient indicates that she may consider a cortisone shot for the right knee in the future but first wants to talk to the spine specialist.  She had no further questions and follow-up as needed.                                          Again, thank you for allowing me to participate in the care of your patient.      Sincerely,    Rudy Contreras MD

## 2024-11-13 NOTE — PROGRESS NOTES
Patient has noted over the last year a tendency to have discomfort in the bilateral lower extremities.  She indicates that if she walks more than a few blocks she will have aching discomfort she feels in the bilateral thighs, bilateral knees, bilateral shins and down to the feet.  With long periods of sitting she will feel a burning sensation in the bilateral feet, left greater than right.  She will have recurrent tingling sensation in the bilateral lower extremities with long periods of standing and walking.  She believes the symptoms began in 2020 but have been worsening over subsequent years.  She has an appointment coming up with neurosurgery within the next week because of MRI findings of the lumbar spine that shows severe lumbar central stenosis, in the setting of a congenitally narrowed spinal canal.    Patient in addition has had episodes of knee swelling, in the setting of a diagnosis of seropositive rheumatoid arthritis.      Patient follows with Dr. Mckinnon for seropositive rheumatoid arthritis.  Patient has been on rituximab since 2018.  Most recent clinic visit 10/11/2024 reviewed by me.  She was having at that time continued flareups of joint pain that was felt to be a combination of inflammatory arthritis, fibromyalgia and chronic pain.  It was felt that her most recent presentation of knee discomfort could be due to osteoarthritis.  She was referred to orthotics to consider a knee injection.  Her Lyrica was increased.  She was encouraged to consider alternative pain control options such as acupuncture through her primary provider.  Patient did see integrative medicine in 2019 for acupuncture.    Standing x-rays of the bilateral knees 1/31/2024 showed no significant degenerative change or bony abnormality.    MRI of the right knee 7/11/2024 consistent with focal area of grade 3 cartilage loss along the medial femoral condyle, without associated subchondral edema.  Intact ACL and PCL.  Medial meniscus  intact.  Free edge tearing of the right lateral meniscus without displaced fragment.  Osteoarthritis greatest along the lateral patellar facet with areas of full-thickness cartilage loss.      Imaging studies below reviewed by me:  2 views right and left knee radiographs 1/31/2024 1:58 PM     History: knee pain; Inflammatory arthritis; Pain in both knees,  unspecified chronicity; Pain in both knees, unspecified chronicity      Comparison: None     Findings:     AP and lateral views of the right and left knee were obtained.      Left: No acute osseous abnormality.  No joint effusion.     Osteophytes with preservation of joint spaces.      Soft tissue is unremarkable.     Right:  No acute osseous abnormality.  No joint effusion.     Osteophytes with preservation of joint spaces.      Soft tissue is unremarkable.                                                                      Impression:  1. No acute osseous abnormality.  2. No substantial degenerative change.     GAURAV ACOSTA MD (Joe)           EXAM: XR KNEE RIGHT 3 VIEWS (supine xrays)  LOCATION: [facility]  DATE: 6/12/2024     INDICATION: Knee pain and swelling.  COMPARISON: 1/31/2024.                                                                      IMPRESSION: Moderate knee joint effusion. Normal joint alignment and spacing. No fracture or bone lesion.          EXAMINATION: MRI of the right knee without contrast     DATE:  7/11/2024     HISTORY: Right knee pain     TECHNIQUE: Multiplanar, multisequence MR imaging of the right knee was  obtained using standard sequences in 3 orthogonal planes without the  use of intravenous or intra-articular gadolinium contrast.     Comparison: Comparison radiographs dated 6/12/2024 were reviewed,  which demonstrated no acute bone abnormality.     FINDINGS:     In the medial compartment, the medial meniscus is intact. There is a  focal area of grade 3 cartilage loss  along the medial femoral condyle  without subchondral edema.     In the lateral compartment, there is free edge fraying of the body of  the lateral meniscus. There is a focal area of grade 2/3 cartilage  loss along the lateral tibial plateau without subchondral edema.     In the patellofemoral compartment, there are focal areas of  full-thickness cartilage loss with subtle subchondral edema, greatest  along the lateral patellar facet.      The anterior and posterior cruciate ligaments are intact.     The tibial collateral ligament is intact.      The anterior iliotibial band, fibular collateral ligament, biceps  femoris tendon, and popliteus tendons are intact.     There is a moderate size joint effusion. No popliteal cyst. No joint  bodies. Probable tiny cartilage rest along the medial aspect of the  right distal femur.     The extensor mechanism is intact and normal in appearance.                                                                       IMPRESSION:  1. Moderate size right knee joint effusion.      2. Free edge tearing of the body of the right knee lateral meniscus  without displaced fragment.     3. Osteoarthrosis in the right knee, as above, greatest along the  lateral patellar facet with areas of full-thickness cartilage loss.     4. The anterior and posterior cruciate ligaments, medial and lateral  supporting structures, and medial meniscus are intact.     STACY FRY MD         MRI lumbar spine 7/11/2024  Impression:   1. Attention on the nomenclature There are 6 type lumbar vertebra  including a lumbarized S1.   2. Congenital spinal canal stenosis.  3. Multilevel thoracolumbar spondylosis most significant at L3-4 with  moderate to severe spinal canal stenosis. No high-grade neural  foraminal stenosis at any level.          PMH:  Past Medical History:   Diagnosis Date    Abnormal glandular Papanicolaou smear of cervix 1992    Allergic rhinitis     Allergy, airborne subst    Arthritis     ASCVD  (arteriosclerotic cardiovascular disease)     Chronic pain     Chronic pancreatitis (H)     idiopathic, Tx: PPI, antioxidants, pancreatic enzymes    Common migraine     Coronary artery disease     Costochondritis     Difficulty in walking(719.7)     Dyspnea on exertion     Ectasia, mammary duct     [facility], persistent nipple discharge    Elevated fasting glucose     Gastro-oesophageal reflux disease     Granulomatosis with polyangiitis (H)     Hernia     History of angina     Hyperlipidemia LDL goal < 100     Hypertension goal BP (blood pressure) < 140/90     Essential hypertension    Iron deficiency anemia     Ischemic cardiomyopathy     Menorrhagia     Migraine headaches     Mild persistent asthma     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 Matias, neg w/u, Rx'd w/prednisone    Ventral hernia, unspecified, without mention of obstruction or gangrene 2012     Past Surgical History:   Procedure Laterality Date    C/SECTION, LOW TRANSVERSE   1996         CARDIAC SURGERY         cardiac stent- recent in 16; 4 other stents    COLONOSCOPY N/A 2023     Procedure: COLONOSCOPY, WITH POLYPECTOMY;  Surgeon: Percy Gross MD;  Location: [facility] OR    DILATION AND CURETTAGE N/A 2016     Procedure: DILATION AND CURETTAGE;   Surgeon: Nahed Butler MD;  [facility]    ENDOMETRIAL SAMPLING (BIOPSY) N/A 4/28/2023     Procedure: ENDOMETRIAL BIOPSY;  Surgeon: Nahed Butler MD;  [facility]    ESOPHAGOSCOPY, GASTROSCOPY, DUODENOSCOPY (EGD), COMBINED N/A 03/31/2022     Procedure: ESOPHAGOGASTRODUODENOSCOPY, WITH BIOPSY;  Surgeon: Enzo Sesay MD;  [facility]    ESOPHAGOSCOPY, GASTROSCOPY, DUODENOSCOPY (EGD), COMBINED N/A 5/25/2023     Procedure: ESOPHAGOGASTRODUODENOSCOPY, WITH BIOPSY;  Surgeon: Percy Gross MD;  Location: [facility] OR    EXAM UNDER ANESTHESIA PELVIC N/A 4/28/2023     Procedure: EXAM UNDER ANESTHESIA;  Surgeon: Nahed Butler MD;  [facility]    HC UGI ENDOSCOPY W EUS   11/07/2008     Dr. Pastrana, possible chronic pancreatitis, fatty liver    HERNIA REPAIR   12/01/2012     [place] [facility] [place] INTRAUTERINE DEVICE N/A 07/06/2016     Procedure: INSERT INTRAUTERINE DEVICE;  Surgeon: Nahed Butler MD;  [facility]    INT UTERINE FIBRIOD EMBOLIZATION   10/29/2014    LAPAROSCOPIC CHOLECYSTECTOMY   05/28/2008     Dr. Arnol GRUBBS TX, CERVICAL   1992     s/p LEERAHUL, done at [facility] in [place].    ORBITOTOMY Right 03/15/2016     Procedure: ORBITOTOMY;  Surgeon: Myron Cyr MD;  Location: [facility]    ORBITOTOMY Right 08/04/2017     Procedure: ORBITOTOMY;  RIGHT ORBITOTOMY AND BIOPSY;  Surgeon: Charis Holbrook MD;  Location: [facility]    REMOVE INTRAUTERINE DEVICE N/A 4/28/2023     Procedure: Remove intrauterine device,;  Surgeon: Nahed Butler MD;  [facility]    REPAIR PTOSIS Right 11/17/2017     Procedure: REPAIR PTOSIS;  RIGHT UPPER LID PTOSIS REPAIR;  Surgeon: Myron Cyr MD;  Location: [facility]    UPPER GI ENDOSCOPY   10/21/2008     mild gastritis, Dr. Rocky [facility]A ECHO HEART XTHORACIC,COMPLETE, W/O DOPPLER   02/04/2004     [facility] WNL, EF 60%       Active problem list:  Patient Active Problem List   Diagnosis    Seasonal  affective disorder (H)    Allergic rhinitis    PCOS (polycystic ovarian syndrome)    Moderate persistent asthma    Chronic pancreatitis (H)    Hypertension goal BP (blood pressure) < 140/90    Common migraine    NSTEMI (non-ST elevated myocardial infarction) (H)    Hyperlipidemia LDL goal <70    ASCVD (arteriosclerotic cardiovascular disease)    GERD (gastroesophageal reflux disease)    Ischemic cardiomyopathy    Hypertensive heart disease    Uterine leiomyoma    Iron deficiency anemia    Costochondritis    Vitamin D deficiency    Breast cancer screening    Spinal stenosis in cervical region    Fibromyalgia    Seronegative rheumatoid arthritis (H)    Type 2 diabetes, HbA1C goal < 8% (H)    Type 2 diabetes mellitus with other specified complication (H)    Hyperlipidemia associated with type 2 diabetes mellitus (H)    Hypertension associated with diabetes (H)    Overweight with body mass index (BMI) 25.0-29.9    Tobacco use disorder    Telogen effluvium    CAD S/P percutaneous coronary angioplasty    Status post coronary angiogram    ANCA-associated vasculitis (H)    Wegener's vasculitis    Type 1 diabetes mellitus with complications (H)    Chest discomfort    Urinary frequency    Abdominal pain, epigastric    Hypokalemia    Leukocytosis, unspecified type    Acute pancreatitis, unspecified complication status, unspecified pancreatitis type       FH:  Family History   Problem Relation Age of Onset    Hypertension Mother     Arthritis Mother     Heart Disease Mother         long qt syndrome    Thyroid Disease Mother     C.A.D. Mother     Heart Disease Father 50        heart attack    Cerebrovascular Disease Father     Diabetes Father     Hypertension Father     Depression Father     C.A.D. Father     Neurologic Disorder Sister         migraines    Neurologic Disorder Sister         migraines    Heart Disease Brother 15        MI at 15, 16.     Arthritis Brother         he passed away, had severe arthritis at age  11    C.A.D. Brother     Anesthesia Reaction Son         PONV    Respiratory Son         asthma    Hypertension Maternal Aunt     Diabetes Maternal Uncle     Hypertension Maternal Uncle     Arthritis Maternal Uncle     Eye Disorder Maternal Uncle         cataracts    Cerebrovascular Disease Maternal Uncle     Family History Negative Other         neg for RA, SLE    Unknown/Adopted No family hx of         unknown neurological issues in her family, mother was adopted    Skin Cancer No family hx of         No known family hx of skin cancer    Deep Vein Thrombosis (DVT) No family hx of        SH:  Social History     Socioeconomic History    Marital status: Single     Spouse name: Not on file    Number of children: 1    Years of education: Not on file    Highest education level: Not on file   Occupational History     Employer: NONE    Tobacco Use    Smoking status: Some Days     Current packs/day: 0.00     Average packs/day: 0.2 packs/day for 1 year (0.2 ttl pk-yrs)     Types: Cigarettes     Start date: 2015     Last attempt to quit: 2016     Years since quittin.7    Smokeless tobacco: Never   Vaping Use    Vaping status: Every Day    Substances: Nicotine   Substance and Sexual Activity    Alcohol use: No     Alcohol/week: 0.0 standard drinks of alcohol    Drug use: No    Sexual activity: Not Currently   Other Topics Concern    Parent/sibling w/ CABG, MI or angioplasty before 65F 55M? Yes   Social History Narrative    Balanced Diet - sometimes    Osteoporosis Prevention Measures - Dairy servings per day: 2 servings weekly    Regular Exercise -  Yes Describe walking 4 times a week    Dental Exam - NO    Seatbelts used - Yes    Self Breast Exam - Yes    Abuse: Current or Past (Physical, Sexual or Emotional)- No    Do you have any concerns about STD's -  No    Do you feel safe in your environment - No    Guns stored in the home - No    Sunscreen used - Yes    Lipids -  YES - Date:     Glucose -  YES -  Date: 012804    Eye Exam - YES - Date: one year ago    Colon Cancer Screening - No    Hemoccults - NO    Pap Test -  YES - Date: 070904, remote history of LEEP    Mammography - YES - Date: last spring, would like to discuss, needs a referral to [facility]    DEXA - NO    Immunizations reviewed and up to date - Yes, last td given in 1997 or 1998     Social Drivers of Health     Financial Resource Strain: Low Risk  (2/9/2024)    Financial Resource Strain     Within the past 12 months, have you or your family members you live with been unable to get utilities (heat, electricity) when it was really needed?: No   Food Insecurity: High Risk (2/9/2024)    Food Insecurity     Within the past 12 months, did you worry that your food would run out before you got money to buy more?: Yes     Within the past 12 months, did the food you bought just not last and you didn t have money to get more?: No   Transportation Needs: Low Risk  (2/9/2024)    Transportation Needs     Within the past 12 months, has lack of transportation kept you from medical appointments, getting your medicines, non-medical meetings or appointments, work, or from getting things that you need?: No   Physical Activity: Not on file   Stress: Not on file   Social Connections: Not on file   Interpersonal Safety: Low Risk  (2/9/2024)    Interpersonal Safety     Do you feel physically and emotionally safe where you currently live?: Yes     Within the past 12 months, have you been hit, slapped, kicked or otherwise physically hurt by someone?: No     Within the past 12 months, have you been humiliated or emotionally abused in other ways by your partner or ex-partner?: No   Housing Stability: Low Risk  (2/9/2024)    Housing Stability     Do you have housing? : Yes     Are you worried about losing your housing?: No       MEDS:  See EMR, reviewed  ALL:  See EMR, reviewed    REVIEW OF SYSTEMS:  CONSTITUTIONAL:NEGATIVE for fever, chills, change in  weight  INTEGUMENTARY/SKIN: NEGATIVE for worrisome rashes, moles or lesions  EYES: NEGATIVE for vision changes or irritation  ENT/MOUTH: NEGATIVE for ear, mouth and throat problems  RESP:NEGATIVE for significant cough or SOB  BREAST: NEGATIVE for masses, tenderness or discharge  CV: NEGATIVE for chest pain, palpitations or peripheral edema  GI: NEGATIVE for nausea, abdominal pain, heartburn, or change in bowel habits  :NEGATIVE for frequency, dysuria, or hematuria  :NEGATIVE for frequency, dysuria, or hematuria  NEURO: NEGATIVE for weakness, dizziness or paresthesias  ENDOCRINE: NEGATIVE for temperature intolerance, skin/hair changes  HEME/ALLERGY/IMMUNE: NEGATIVE for bleeding problems  PSYCHIATRIC: NEGATIVE for changes in mood or affect          Objective: In the seated position on the table a straight leg raise is negative bilaterally.  5 out of 5 strength at hip, knee, ankle and foot.  The bilateral knees reveal no palpable effusion.  She describes discomfort in the bilateral knees to light touch and allows a partial exam only .  She has global discomfort about the bilateral knee patellar facets, medial joint line, lateral joint lines to light touch.  I can flex and extend both knees through full range of motion and there is no swelling in the popliteal space or tenderness in the bilateral calves.  Overlying skin is normal, no signs of cellulitis.      I personally viewed the patient the previous x-rays of her knees standing that showed no degenerative change but the MRI of the right knee that shows mild chondromalacia changes and degenerative meniscal changes.  We discussed that these are not surgical findings but can be associated with weightbearing knee pain.  We discussed the findings of her lumbar spine MRI with the help of diagram online.      a: Lumbar spinal stenosis, severe.  History of rheumatoid arthritis.  Mild right-sided DJD seen on MRI only.  Degenerative meniscal findings right knee    Plan:  We discussed the option of an empiric right-sided knee cortisone shot to see if it improves some of her symptoms.  However her story sounds more consistent with lumbar spinal stenosis.  We discussed options for physical therapy for the right knee, declined for now.  We discussed the option of a pull-up knee sleeve.  Patient indicates that she may consider a cortisone shot for the right knee in the future but first wants to talk to the spine specialist.  She had no further questions and follow-up as needed.

## 2024-11-15 DIAGNOSIS — G62.9 NEUROPATHY: ICD-10-CM

## 2024-11-18 ENCOUNTER — TELEPHONE (OUTPATIENT)
Dept: PALLIATIVE MEDICINE | Facility: CLINIC | Age: 59
End: 2024-11-18
Payer: COMMERCIAL

## 2024-11-18 NOTE — TELEPHONE ENCOUNTER
Problem: Potential for impaired gas exchange  Goal: Patient will not exhibit signs/symptoms of respiratory distress  Outcome: PROGRESSING AS EXPECTED  Infant pink and crying with no signs of respiratory distress.         pregabalin (LYRICA) 25 MG capsule       Last Written Prescription Date:  10-16-24  Last Fill Quantity: 120,   # refills: 4  Last Office Visit : 10-11-24  Future Office visit:  1-23-25    Routing refill request to provider for review/approval because:  Drug not on the [facility], [facility] or [facility] refill protocol or controlled substance  Pharm change  Pharm called, unable to transfer controlled Rx.

## 2024-11-18 NOTE — TELEPHONE ENCOUNTER
Patient is requesting rx be transferred to [facility] from [facility].     Plan:  Sent to provider to review and sign as it is a controlled substance.       Hailey Oneil RN  [facility]

## 2024-11-19 RX ORDER — PREGABALIN 25 MG/1
50 CAPSULE ORAL DAILY
Qty: 180 CAPSULE | Refills: 1 | Status: SHIPPED | OUTPATIENT
Start: 2024-11-19

## 2024-11-21 ENCOUNTER — OFFICE VISIT (OUTPATIENT)
Dept: ANESTHESIOLOGY | Facility: CLINIC | Age: 59
End: 2024-11-21
Attending: INTERNAL MEDICINE
Payer: COMMERCIAL

## 2024-11-21 VITALS — SYSTOLIC BLOOD PRESSURE: 110 MMHG | HEART RATE: 79 BPM | OXYGEN SATURATION: 98 % | DIASTOLIC BLOOD PRESSURE: 79 MMHG

## 2024-11-21 DIAGNOSIS — M48.07 SPINAL STENOSIS OF LUMBOSACRAL REGION: ICD-10-CM

## 2024-11-21 DIAGNOSIS — M51.369 BULGING LUMBAR DISC: ICD-10-CM

## 2024-11-21 DIAGNOSIS — M79.18 MYOFASCIAL PAIN: Primary | ICD-10-CM

## 2024-11-21 ASSESSMENT — PAIN SCALES - GENERAL: PAINLEVEL_OUTOF10: WORST PAIN (10)

## 2024-11-21 NOTE — PATIENT INSTRUCTIONS
Referrals:    Pain Physical Therapy Referral placed-   If you don't hear from a representative within 2 business days, please call [phone].          Treatment planning:    Tens Unit ordered.     [facility]:        [facility]  [address], [address]  [place], [place] [id number] [phone]          [facility] [place]  [address], [place] [id number] [phone]        [facility] [place]  [address] [place] [id number] [phone]        [facility] - [place], [place] [id number] [phone]        [facility] [place] [address] [phone]        [facility] - [place], [place] [id number] [phone]       Recommended Follow up:      Follow up as needed.      To speak with a nurse, schedule/reschedule/cancel a clinic appointment, or request a medication refill call: [phone]    You can also reach us by Gust: [email or web address]

## 2024-11-21 NOTE — NURSING NOTE
Patient presents with:  Pain  Consult: New patient-pain      Worst Pain (10)     Pain Medications       Analgesics Other Refills Start End     acetaminophen (TYLENOL) 325 MG tablet 4 1/21/2021 --    Sig - Route: Take 1-2 tablets (325-650 mg) by mouth every 6 hours as needed for pain (headache) - Oral    Class: E-Prescribe       Opioid Agonists Refills Start End     traMADol (ULTRAM) 50 MG tablet 3 12/9/2023 --    Sig: TAKE 1 TABLET(50 MG) BY MOUTH EVERY 8 HOURS AS NEEDED FOR SEVERE PAIN    Class: E-Prescribe    Prior authorization: Closed            What medications are you using for pain? Tramadol, Tylenol    Have you been seen by another pain clinic/ provider? No    Expectations: to talk about findings from x rays    Alice Corcoran CMA
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
## 2024-12-03 ENCOUNTER — TELEPHONE (OUTPATIENT)
Dept: PALLIATIVE MEDICINE | Facility: CLINIC | Age: 59
End: 2024-12-03
Payer: COMMERCIAL

## 2024-12-03 DIAGNOSIS — M79.18 MYOFASCIAL PAIN: Primary | ICD-10-CM

## 2024-12-03 NOTE — TELEPHONE ENCOUNTER
Health Call Center    Phone Message    May a detailed message be left on voicemail: yes     Reason for Call: Other: Patient called in and stated that she received a prescription for a TENS unit from [facility]. Patient stated that the prescription is only for within [place], and patient found out it will take up to 2 weeks to get the TENS unit within [place] and patient stated she does not want to wait that long. Patient requested a new TENS unit prescription to be written so she can take it anywhere. Patient also requested a call once it is done so she knows where to get it. Please call when available.       Action Taken: Message routed to:  [facility] Pain     Travel Screening: Not Applicable     Date of Service:

## 2024-12-03 NOTE — TELEPHONE ENCOUNTER
LM informing patient that she can try to take the current order that she has to a different location to get it filled for a TENS unit and if that location does not take the order she can then call into our office and request for a new one to be written for a specific place.    Alice Corcoran, UZAIR'

## 2024-12-03 NOTE — TELEPHONE ENCOUNTER
[facility] Call Center    Phone Message    May a detailed message be left on voicemail: yes     Reason for Call: Other: Patient calling back stating she wants a generic order to go get the tens unit anywhere. Please call her back to advise.      Action Taken: Message routed to:  [facility] Pain Clinic    Travel Screening: Not Applicable     Date of Service:

## 2024-12-03 NOTE — TELEPHONE ENCOUNTER
Spoke with patient informed her that I will send a message to Dr. Fuchs to see if she will be willing to write a new order that is generic so that the patient can take anywhere to get a TENS unit. I informed her that we will follow up once we hear back from Dr. Fuchs.    Alice Corcoran, CMA

## 2024-12-04 NOTE — TELEPHONE ENCOUNTER
RN spoke with Dr. Fuchs. Curtis for a new Tens Unit order to be used at any external location. Writer called patient to update that a new order was placed and we will mail the order to her mailing address. Patient verbalized understanding and appreciated.     Desiree Sepulveda RN

## 2025-01-09 DIAGNOSIS — I25.10 CORONARY ARTERY DISEASE INVOLVING NATIVE HEART WITHOUT ANGINA PECTORIS, UNSPECIFIED VESSEL OR LESION TYPE: ICD-10-CM

## 2025-01-09 DIAGNOSIS — I10 HYPERTENSION, UNSPECIFIED TYPE: ICD-10-CM

## 2025-01-09 DIAGNOSIS — I10 BENIGN ESSENTIAL HYPERTENSION: ICD-10-CM

## 2025-01-09 DIAGNOSIS — I10 HYPERTENSION GOAL BP (BLOOD PRESSURE) < 140/90: ICD-10-CM

## 2025-01-11 DIAGNOSIS — I10 HYPERTENSION, UNSPECIFIED TYPE: ICD-10-CM

## 2025-01-13 ENCOUNTER — TELEPHONE (OUTPATIENT)
Dept: CARDIOLOGY | Facility: CLINIC | Age: 60
End: 2025-01-13
Payer: COMMERCIAL

## 2025-01-13 RX ORDER — CLOPIDOGREL BISULFATE 75 MG/1
75 TABLET ORAL DAILY
Qty: 30 TABLET | Refills: 1 | OUTPATIENT
Start: 2025-01-13

## 2025-01-13 RX ORDER — METOPROLOL SUCCINATE 100 MG/1
100 TABLET, EXTENDED RELEASE ORAL DAILY
Qty: 30 TABLET | Refills: 3 | Status: SHIPPED | OUTPATIENT
Start: 2025-01-13

## 2025-01-13 RX ORDER — CLOPIDOGREL BISULFATE 75 MG/1
75 TABLET ORAL DAILY
Qty: 30 TABLET | Refills: 3 | Status: SHIPPED | OUTPATIENT
Start: 2025-01-13

## 2025-01-13 RX ORDER — LISINOPRIL AND HYDROCHLOROTHIAZIDE 20; 25 MG/1; MG/1
1 TABLET ORAL DAILY
Qty: 30 TABLET | Refills: 3 | Status: SHIPPED | OUTPATIENT
Start: 2025-01-13

## 2025-01-13 RX ORDER — SPIRONOLACTONE 25 MG/1
TABLET ORAL
Qty: 45 TABLET | Refills: 3 | Status: SHIPPED | OUTPATIENT
Start: 2025-01-13

## 2025-01-13 RX ORDER — PRAVASTATIN SODIUM 40 MG
40 TABLET ORAL DAILY
Qty: 30 TABLET | Refills: 3 | Status: SHIPPED | OUTPATIENT
Start: 2025-01-13

## 2025-01-13 NOTE — TELEPHONE ENCOUNTER
[facility] Call Center    Phone Message    May a detailed message be left on voicemail: yes     Reason for Call: Medication Refill Request    Has the patient contacted the pharmacy for the refill? Yes   Name of medication being requested:   clopidogrel (PLAVIX) 75 MG tablet   lisinopril-hydrochlorothiazide (ZESTORETIC) 20-25 MG tablet   metoprolol succinate ER (TOPROL XL) 100 MG 24 hr tablet   pravastatin (PRAVACHOL) 40 MG tablet   spironolactone (ALDACTONE) 25 MG tablet     Provider who prescribed the medication: Dr. Peace  Pharmacy:   [facility] [id number] [address]     Date medication is needed: Pt has enough for 3-4 days  NOTE:  Pt has an appt w/Dr. Peace March 20, 2025.    Action Taken: Message routed to:  [facility] ([facility] cardio    Travel Screening: Not Applicable    Thank you!  [facility]       Date of Service:

## 2025-01-13 NOTE — TELEPHONE ENCOUNTER
clopidogrel (PLAVIX) 75 MG tablet 30 tablet 6 5/31/2024     lisinopril-hydrochlorothiazide (ZESTORETIC) 20-25 MG tablet 30 tablet 6 5/31/2024     metoprolol succinate ER (TOPROL XL) 100 MG 24 hr tablet 30 tablet 6 5/31/2024     pravastatin (PRAVACHOL) 40 MG tablet 30 tablet 6 5/31/2024     spironolactone (ALDACTONE) 25 MG tablet 45 tablet 6 5/31/2024     Last Office Visit : 12/14/23  Future Office visit:  3/20/25    Routing refill request to provider for review/approval because:  Failed protocol  Overdue follow-up and labs

## 2025-01-23 ENCOUNTER — TELEPHONE (OUTPATIENT)
Dept: RHEUMATOLOGY | Facility: CLINIC | Age: 60
End: 2025-01-23

## 2025-01-23 ENCOUNTER — VIRTUAL VISIT (OUTPATIENT)
Dept: RHEUMATOLOGY | Facility: CLINIC | Age: 60
End: 2025-01-23
Attending: INTERNAL MEDICINE
Payer: COMMERCIAL

## 2025-01-23 VITALS — BODY MASS INDEX: 28.35 KG/M2 | HEIGHT: 63 IN | WEIGHT: 160 LBS

## 2025-01-23 DIAGNOSIS — I77.82 ANCA-ASSOCIATED VASCULITIS (H): Primary | ICD-10-CM

## 2025-01-23 DIAGNOSIS — M31.30 GRANULOMATOSIS WITH POLYANGIITIS WITHOUT RENAL INVOLVEMENT (H): Primary | ICD-10-CM

## 2025-01-23 DIAGNOSIS — Z51.81 MEDICATION MONITORING ENCOUNTER: ICD-10-CM

## 2025-01-23 DIAGNOSIS — M31.30 GRANULOMATOSIS WITH POLYANGIITIS WITHOUT RENAL INVOLVEMENT (H): ICD-10-CM

## 2025-01-23 PROCEDURE — 98006 SYNCH AUDIO-VIDEO EST MOD 30: CPT | Performed by: INTERNAL MEDICINE

## 2025-01-23 PROCEDURE — G2211 COMPLEX E/M VISIT ADD ON: HCPCS | Performed by: INTERNAL MEDICINE

## 2025-01-23 NOTE — NURSING NOTE
Patient declined medication review with [facility] during rooming.     Current patient location: [address] [place] [id number]    Is the patient currently in the state of [place]? YES    Visit mode: VIDEO    If the visit is dropped, the patient can be reconnected by:VIDEO VISIT: Text to cell phone:   Telephone Information:   Mobile [phone]       Will anyone else be joining the visit? NO  (If patient encounters technical issues they should call [phone]    Are changes needed to the allergy or medication list? Pt declined med review    Are refills needed on medications prescribed by this physician? Discuss with provider    Rooming Documentation:  Patient declined to complete questionnaire(s)    Reason for visit: FANNIE Grimes [facility]

## 2025-01-23 NOTE — LETTER
1/23/2025       RE: Janine Cornell  [address]  [facility] [address]     Dear Colleague,    Thank you for referring your patient, Janine Cornell, to the [facility]HE[facility]OLOG[facility] [place] at [facility]. Please see a copy of my visit note below.    Joined the call at 1/23/2025, 2:42:46 pm.  Left the call at 1/23/2025, 2:58:35 pm.  Virtual Visit Details    Originating Location (pt. Location): Home  Distant Location (provider location):  off-site  Platform used for Video Visit: [facility]      Rheumatology F/U Visit Note    Last visit note: 10/11/2024    Today's visit date: 1/23/2025    Reason for in person visit: F/U GPA, high risk med use    HPI     Ms. 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 She is s/p tx with HCQ since 5/2014, now off due to abnormal eye exam. On rituximab since 12/20218 with great response. Previously tried and did not tolerate MTX, AZA.    She had lateral orbitotomy and debulking orbital mass right eye on 9/26/18 with Dr. Shah and Dr. Valdez at [place]. 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 There were no complications according to the op note.      Today 1/23/2025:    Continues to have pain over joints with neuropathy, lyrica did not help, likes to stop it. Has Rx for TENS unit, will use it     10/11/2024:    -lots of pain affecting feet, legs    -has not left house since June, bottom of feet swell up    -hair is coming out    -rituximab does not help with pain    -has swelling inside mouth    -the other day, R side of face swelled up    -after last rituxiamb, had candidia infection under breast, BG was down    -had sweating fater rituximab    -lyrica is not helping    9/6, 9/26 rituximab 1 gram    6/5/2024:    -reports arm weakness    -has back pain    -continues to have joint pain    -has more headaches, R  eye pain    -neuropathy is an issue     1/31/2024:      Nortriptyline was prescribed by another provider, but has not tried it yet    R side of her face started swelling again.    Reports pain over neck, hips, knees and hands.          9/6/2023:    -reports pain/swelling over hands/feet, has more arthritis problem over past year  -gets numbness over toes, bottom of feet, can't see neurology till 1/2024  -walking causes pain in the feet  -gets pain over hips after a trip to [place], it is a new problem  -sometimes gets swelling over R cheek, noticed it yesterday outside with heat, her face was also bright red        3/9/2023: Reports pain/swelling of her hands. S/p last rituximab 1 gram on 9/7/2022.       1/25/2023: Janine is doing a phone visit for follow up, was feeling ill and switched in person to phone visit. She got sick around [facility], saw her PCP on 12/17, was tested positive for COVID, it took a longtime to feel better, did not take paxlovid as it was already past 2 weeks. Since then, she has not been feeling well. Has headaches, body ache, her eyes especially R eye look pink, they burn and itch a lot. Has sense of smell but can not taste her food. She is very tired, hardly leaves her house. Last time, left house for doctor visit, was tired and sore. Has tingling and pinprick feeling over arms and legs. Has upcoming follow up with PCP on 2/10. She had thyroid US for thyroid nodules. She had abnormal screening mammogram on 1/9, will go back for diagnostic mammogram and US of L breast on 1/31.          08/19/2022  Reports fatigue and headaches. Headache worsens after taking Zofran for nausea. Joint symptoms come and go. Last rituximab on 4/6/22. Rituximab is helpful but feels like it wears off prior to the 6 months. Next appointment scheduled for October 2022. Develops intermittent vaginal yeast infection and thrush related to rising blood glucose. Right eye without symptoms. No new fevers, chills, skin  changes. Son is wondering if she takes herbal supplements for headaches will this interact with any of her medications. Scheduled to meet with pharmacist today.        4/22/2022:   Each rituximab infusion causes vaginal yeast infection and thrush related to rise in BG. Her BG has stayed in higher level since last rituximab. Has more ache and pain, myalgia and arthralgia. Recently has had headaches with high BP. Had last rituximab 1000 mg on 4/6.Her R eye is itching and swelling up.  Today, her BG is 177.Has gained 20 lbs since Feb 2022. Had severe pain in her arm after covid vaccine, it took a month to get better.      12/16/2021: Janine presents for follow up. Reports ESOB with cold, has ongoing joint pain. R eye pain is intermittent.  Reports headaches after rituximab 1 gram on 10/18/2021.  Last week, her R knee was hurting.      8/20/2021: Janine has pain over arms, knees. Some days, feel stiff all day long. Some days can't do stairs. Hard to tell if there is swelling as has more water retention during summer.  Some days, eye swells up like today. No fevers, SOB, CP or cough.  Has rosacea but some days wakes up with heat rash over sun. Her face is peeling.  Sometimes can't lift things or grab things with pain from elbow to hands. Has shoulder and neck pain but this forearm pain is new.  Stopped HCQ in mid July due to concern for potential HCQ toxicity on OCT, her eye exam with Dr. Vernon has been re-scheduled and upcoming on 9/14 to address HCQ toxicity, pain is not worse off HCQ.  Not COVID vaccinated but is cautious.      5/10/2021: Joint ache, knees hurt, R elbow hurts, R arm hurts, R hand hurts. Has lots of swelling in her joints especially hands.    Depending on weather, joints jhurt, sometimes pain is 7/10.  Has problem with taking stairs and balance.  Gets off balance.   R eye gets tinglin, swelling.  Gets out of breath, gets worse with seasonal allergies.  Over past month and half, took nitro more, like 8  times.  No hemooptysis, no hematuria.  Has allergies.      4/21/2021: Had last rituximab 1 gram on 1/19, 2/2/2021. Reports rituximab starts to wear off earlier, has more sx this time. Eye swelling is intermittent. Gets indigestion/ GERD sx with constipation after the infusion.  She reports having asthma, swelling of neck, arms. She thinks that it could be from her vasculitis. She is worried about going down on rituximab dose.   Otherwise unchanged sx, no SOB or hemoptysis or persistent cough, or   Somedays her knees and hips hurt a lot, feel like bone on bone in her knees, especially on changing position.      Component      Latest Ref Rng 2/28/2013 2/28/2013          12:14 PM 12:14 PM   WBC      4.0 - 11.0 10e9/L     RBC Count      3.8 - 5.2 10e12/L     Hemoglobin      11.7 - 15.7 g/dL     Hematocrit      35.0 - 47.0 %     MCV      78 - 100 fl     MCH      26.5 - 33.0 pg     MCHC      31.5 - 36.5 g/dL     RDW      10.0 - 15.0 %     Platelet Count      150 - 450 10e9/L     Specimen Description           Lyme Screen IgG and IgM           Vitamin D Deficiency screening      30 - 75 ug/L     Ferritin      10 - 300 ng/mL     Sed Rate      0 - 20 mm/h     ALLI Screen by EIA      <1.0     Rheumatoid Factor      0 - 14 IU/mL     CK Total      30 - 225 U/L  78   Uric Acid      2.5 - 7.5 mg/dL 6.7      Component      Latest Ref Rng 2/28/2013          12:14 PM   WBC      4.0 - 11.0 10e9/L    RBC Count      3.8 - 5.2 10e12/L    Hemoglobin      11.7 - 15.7 g/dL    Hematocrit      35.0 - 47.0 %    MCV      78 - 100 fl    MCH      26.5 - 33.0 pg    MCHC      31.5 - 36.5 g/dL    RDW      10.0 - 15.0 %    Platelet Count      150 - 450 10e9/L    Specimen Description       Serum   Lyme Screen IgG and IgM       Test value: <0.75....Interpretation: Negative....If you highly suspect Lyme . . .   Vitamin D Deficiency screening      30 - 75 ug/L    Ferritin      10 - 300 ng/mL    Sed Rate      0 - 20 mm/h    ALLI Screen by EIA      <1.0     Rheumatoid Factor      0 - 14 IU/mL    CK Total      30 - 225 U/L    Uric Acid      2.5 - 7.5 mg/dL      Component      Latest Ref Rng 2/28/2013 2/28/2013 2/28/2013 2/28/2013          12:14 PM 12:14 PM 12:14 PM 12:14 PM   WBC      4.0 - 11.0 10e9/L       RBC Count      3.8 - 5.2 10e12/L       Hemoglobin      11.7 - 15.7 g/dL       Hematocrit      35.0 - 47.0 %       MCV      78 - 100 fl       MCH      26.5 - 33.0 pg       MCHC      31.5 - 36.5 g/dL       RDW      10.0 - 15.0 %       Platelet Count      150 - 450 10e9/L       Specimen Description             Lyme Screen IgG and IgM             Vitamin D Deficiency screening      30 - 75 ug/L       Ferritin      10 - 300 ng/mL    10   Sed Rate      0 - 20 mm/h   23 (H)    ALLI Screen by EIA      <1.0  <1.0 . . .     Rheumatoid Factor      0 - 14 IU/mL 26 (H)      CK Total      30 - 225 U/L       Uric Acid      2.5 - 7.5 mg/dL         5/2013  CBC WITH PLATELETS DIFFERENTIAL       Component Value Range    WBC 11.3 (*) 4.0 - 11.0 10e9/L    RBC Count 4.56  3.8 - 5.2 10e12/L    Hemoglobin 11.1 (*) 11.7 - 15.7 g/dL    Hematocrit 34.3 (*) 35.0 - 47.0 %    MCV 75 (*) 78 - 100 fl    MCH 24.3 (*) 26.5 - 33.0 pg    MCHC 32.4  31.5 - 36.5 g/dL    RDW 16.1 (*) 10.0 - 15.0 %    Platelet Count 315  150 - 450 10e9/L    Diff Method Automated Method      % Neutrophils 71.6  40 - 75 %    % Lymphocytes 20.9  20 - 48 %    % Monocytes 4.3  0 - 12 %    % Eosinophils 2.8  0 - 6 %    % Basophils 0.2  0 - 2 %    % Immature Granulocytes 0.2  0 - 0.4 %    Absolute Neutrophil 8.1  1.6 - 8.3 10e9/L    Absolute Lymphocytes 2.4  0.8 - 5.3 10e9/L    Absolute Monoctyes 0.5  0.0 - 1.3 10e9/L    Absolute Eosinophils 0.3  0.0 - 0.7 10e9/L    Absolute Basophils 0.0  0.0 - 0.2 10e9/L    Abs Immature Granulocytes 0.0  0 - 0.03 10e9/L   AMYLASE       Component Value Range    Amylase 103  30 - 110 U/L   COMPREHENSIVE METABOLIC PANEL       Component Value Range    Sodium 144  133 - 144 mmol/L    Potassium 3.8   3.4 - 5.3 mmol/L    Chloride 105  94 - 109 mmol/L    Carbon Dioxide 23  20 - 32 mmol/L    Anion Gap 17  6 - 17 mmol/L    Glucose 173 (*) 60 - 99 mg/dL    Urea Nitrogen 13  5 - 24 mg/dL    Creatinine 0.83  0.52 - 1.04 mg/dL    GFR Estimate 74  >60 mL/min/1.7m2    GFR Estimate If Black 90  >60 mL/min/1.7m2    Calcium 9.7  8.5 - 10.4 mg/dL    Bilirubin Total 0.4  0.2 - 1.3 mg/dL    Albumin 4.3  3.9 - 5.1 g/dL    Protein Total 7.8  6.8 - 8.8 g/dL    Alkaline Phosphatase 66  40 - 150 U/L    ALT 36  0 - 50 U/L    AST 28  0 - 45 U/L   CK TOTAL       Component Value Range    CK Total 66  30 - 225 U/L   CRP INFLAMMATION       Component Value Range    CRP Inflammation 10.4 (*) 0.0 - 8.0 mg/L   LIPASE       Component Value Range    Lipase 353 (*) 20 - 250 U/L   ERYTHROCYTE SEDIMENTATION RATE AUTO       Component Value Range    Sed Rate 26 (*) 0 - 20 mm/h   ROUTINE UA WITH MICROSCOPIC REFLEX TO CULTURE       Component Value Range    Color Urine Yellow      Appearance Urine Slightly Cloudy      Glucose Urine 30 (*) NEG mg/dL    Bilirubin Urine Negative  NEG    Ketones Urine 5 (*) NEG mg/dL    Specific Gravity Urine 1.026  1.003 - 1.035    Blood Urine Trace (*) NEG    pH Urine 5.0  5.0 - 7.0 pH    Protein Albumin Urine 10 (*) NEG mg/dL    Urobilinogen mg/dL Normal  0.0 - 2.0 mg/dL    Nitrite Urine Negative  NEG    Leukocyte Esterase Urine Negative  NEG    Source Midstream Urine      WBC Urine 1  0 - 2 /HPF    RBC Urine 4 (*) 0 - 2 /HPF    Squamous Epithelial /HPF Urine <1  0 - 1 /HPF    Mucous Urine Present (*) NEG /LPF    Hyaline Casts 3 (*) 0 - 2 /LPF    Calcium Oxalate Moderate (*) NEG /HPF   COMPLEMENT C3       Component Value Range    Complement C3 143  76 - 169 mg/dL   COMPLEMENT C4       Component Value Range    Complement C4 31  15 - 50 mg/dL   HEPATITIS C ANTIBODY       Component Value Range    Hepatitis C Antibody Negative  NEG   CARDIOLIPIN ANTIBODY IGG AND IGM       Component Value Range    Cardiolipin IgG Marline    0 -  15.0 GPL    Value: <15.0      Interpretation:  Negative    Cardiolipin IgM Marline    0 - 12.5 MPL    Value: <12.5      Interpretation:  Negative   LUPUS PANEL       Component Value Range    Lupus Result    NEG    Value: Negative      (Note)      COMMENTS:      The INR is normal.      APTT is normal.  1:2 Mix is not indicated.      DRVVT Screen is normal.      Thrombin time is normal.      NEGATIVE TEST; A LUPUS ANTICOAGULANT WAS NOT DETECTED IN THIS      SPECIMEN WITHIN THE LIMITS OF THE TESTING REPERTOIRE.      If the clinical picture is strongly suggestive of an antiphospholipid      syndrome, recommend anticardiolipin and beta-2-glycoprotein (IgG and      IgM) antibody tests.      Leela Franks M.D.  [phone]      5/2/2013            INR =  0.93 N = 0.86-1.14  (No additional charge)      TT = 15.7 N = 13.0-19.0 sec  (No additional charge)            APTT'S:    Seconds      Reagent =  Stago LA      Normal  =  38      Patient  =  34      1:2 Mix  =  N/A      Reference =  31-43             DILUTE MADELINE VIPER VENOM TEST:      DRVVT Screen Ratio = 0.76 Normal = <1.21         IMMUNOGLOBULIN G SUBCLASSES       Component Value Range    IGG 1030  695 - 1620 mg/dL    IgG1 488  300 - 856 mg/dL    IgG2 325  158 - 761 mg/dL    IgG3 47  24 - 192 mg/dL    IgG4 18  11 - 86 mg/dL   SUNNY ANTIBODY PANEL       Component Value Range    Ribonucleic Protein IgG Antibody 0      Smith Antibody IgG 1      SSA (RO) Antibody IgG 4      SSB (LA) Antibody IgG 0      Scleroderma Antibody IgG 0     BETA 2 GLYCOPROTEIN ANTIBODIES IGG IGM       Component Value Range    Beta-2-Glycopro IgG 1      Beta-2-Glycopro IgM 5     CYCLIC CIT PEPT IGG       Component Value Range    Cyclic Cit Pept IgG/IgA    <20 UNITS    Value: <20      Interpretation:  Negative   DNA DOUBLE STRANDED ANTIBODIES       Component Value Range    DNA-ds    0 - 29 IU/mL    Value: <15      Interpretation:  Negative       CT CHEST PULMONARY EMBOLISM W CONTRAST 5/20/2015  4:57 PM  HISTORY: Pain. SOB. Elevated d-dimer.   TECHNIQUE: 65 mL Isovue 370. Axial images with coronal  reconstructions.  COMPARISON: None.  FINDINGS: Calcified granuloma with surrounding scarring in the  posterolateral left upper lobe. Triangular shaped opacity at the right  lung base in the lateral right middle lobe could represent some  scarring, atelectasis or infiltrate. There is also some scarring or  atelectasis in the posteromedial right lung base. Lungs otherwise  clear.  The pulmonary arteries are well opacified. No CT evidence for acute  pulmonary embolus. No aortic dissection.  Diffuse fatty infiltration of the liver.  IMPRESSION  IMPRESSION:   1. No pulmonary embolus identified.  2. Small focus of atelectasis, infiltrate or scarring in the lateral  right middle lobe.  3. Old granulomatous disease.  4. Otherwise negative.  SILVERIO VAZQUEZ MD    Copath Report      Patient Name: FAVIO MARTINEZ   MR#: [id number]   Specimen #: [id number]   Collected: 3/15/2016   Received: 3/15/2016   Reported: 3/17/2016 13:33   Ordering Phy(s): PARVEEN ENRIQUEZ     ORIGINAL REPORT FOLLOWS ADDENDUM  ADDENDUM     TO ORIGINAL REPORT   Status: Signed Out   Date Ordered:3/18/2016   Date Complete:3/18/2016   Date Reported:3/18/2016 12:06   Signed Out By: aMrianne Godfrey MD     INTERPRETATION:   This addendum is done to incorporate the results of fungal (GMS) stains   done on the specimen.  Specimen is negative for fungal organisms.  The   original final diagnosis remains unchanged.     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  Negative for malignancy(Please   see microscopic description)     Electronically signed out by:     Marianne Godfrey MD     CLINICAL HISTORY:   right orbital mass     GROSS:   The  "specimen is received labeled \"right orbital biopsy\" and consists of   red-tan nodule measuring 1.5 x 0.9 x 0.6 cm with one smooth side and   opposite rough side consistent with periosteum.  The specimen is   bisected revealing homogenous pale tan fleshy cut surface.  Touch   preparations are prepared, air dried and fixed, portion of specimen is   submitted in RPMI for possible lymphoma workup [facility],   ([facility], [place], [place] ).  The remainder is entirely submitted.   (Dictated by: Marianne Godfrey MD 3/15/2016 04:45 PM)     MICROSCOPIC:     Specimen consists of portion of lacrimal gland with acute and chronic   inflammation.  Focal area of microabscess formation associated with   small areas of necrosis are also present.  Inflammation is seen   extending to the periorbital adipose tissue forming panniculitis.   Specimen is negative for malignancy.  Samples sent for immunophenotyping   to [facility], ([facility], [place], [place] ) reveals no evidence   of monoclonality or aberrant antigen expression.  A GMS (fungal) stain   is pending and results will be reported as an addendum.     CPT Codes:   A: 39959-QD5, 38618-NRRVC, SOH, 79624-POPOX, 77181-PFQQ     TESTING LAB LOCATION:   [address]  [address] [phone]     COLLECTION SITE:   [facility]   [facility]            Component      Latest Ref Rng 4/29/2016   Nucleated RBCs      0 /100 0   Absolute Neutrophil      1.6 - 8.3 10e9/L 8.9 (H)   Absolute Lymphocytes      0.8 - 5.3 10e9/L 3.2   Absolute Monocytes      0.0 - 1.3 10e9/L 0.8   Absolute Eosinophils      0.0 - 0.7 10e9/L 0.2   Absolute Basophils      0.0 - 0.2 10e9/L 0.1   Abs Immature Granulocytes      0 - 0.4 10e9/L 0.1   Absolute Nucleated RBC       0.0   IGG      695 - 1620 mg/dL 836   IgG1      300 - 856 mg/dL 280 (L)   IgG2      158 - 761 mg/dL 277   IgG3      24 - 192 mg/dL 35   IgG4      11 " - 86 mg/dL 16   RNP Antibody IgG      0.0 - 0.9 AI <0.2 . . .   Smith SUNNY Antibody IgG      0.0 - 0.9 AI <0.2 . . .   SSA (Ro) (SUNNY) Antibody, IgG      0.0 - 0.9 AI <0.2 . . .   SSB (La) (SUNNY) Antibody, IgG      0.0 - 0.9 AI <0.2 . . .   Scleroderma Antibody Scl-70 SUNNY IgG      0.0 - 0.9 AI <0.2 . . .   Cholesterol      <200 mg/dL 154   Triglycerides      <150 mg/dL 220 (H)   HDL Cholesterol      >49 mg/dL 42 (L)   LDL Cholesterol Calculated      <100 mg/dL 67   Non HDL Cholesterol      <130 mg/dL 111   M Tuberculosis Result      NEG Negative   M Tuberculosis Antigen Value       0.00   Albumin      3.4 - 5.0 g/dL 4.3   ALT      0 - 50 U/L 30   AST      0 - 45 U/L 10   Complement C3      76 - 169 mg/dL 157   Complement C4      15 - 50 mg/dL 32   CRP Inflammation      0.0 - 8.0 mg/L <2.9   Sed Rate      0 - 20 mm/h 2   DNA-ds      <10 IU/mL 1   Cyclic Citrullinated Peptide Antibody, IgG      <7 U/mL 1   Rheumatoid Factor      <20 IU/mL <20   Proteinase 3 Antibody IgG      0.0 - 0.9 AI <0.2 . . .   Myeloperoxidase Antibody IgG      0.0 - 0.9 AI 2.5 (H)   Vitamin D Deficiency screening      20 - 75 ug/L 24   Hemoglobin A1C      4.3 - 6.0 % 7.9 (H)   ALLI by IFA IgG       1:40 (A) . . .     Component      Latest Ref Rng & Units 1/16/2021   WBC      4.0 - 11.0 10e9/L    RBC Count      3.8 - 5.2 10e12/L    Hemoglobin      11.7 - 15.7 g/dL    Hematocrit      35.0 - 47.0 %    MCV      78 - 100 fl    MCH      26.5 - 33.0 pg    MCHC      31.5 - 36.5 g/dL    RDW      10.0 - 15.0 %    Platelet Count      150 - 450 10e9/L    Diff Method          % Neutrophils      %    % Lymphocytes      %    % Monocytes      %    % Eosinophils      %    % Basophils      %    % Immature Granulocytes      %    Nucleated RBCs      0 /100    Absolute Neutrophil      1.6 - 8.3 10e9/L    Absolute Lymphocytes      0.8 - 5.3 10e9/L    Absolute Monocytes      0.0 - 1.3 10e9/L    Absolute Eosinophils      0.0 - 0.7 10e9/L    Absolute Basophils      0.0 -  0.2 10e9/L    Abs Immature Granulocytes      0 - 0.4 10e9/L    Absolute Nucleated RBC          IGG      610 - 1,616 mg/dL    IGA      84 - 499 mg/dL    IGM      35 - 242 mg/dL    Creatinine      0.52 - 1.04 mg/dL    GFR Estimate      >60 mL/min/1.73:m2    GFR Estimate If Black      >60 mL/min/1.73:m2    COVID-19 Virus PCR to [facility] - Source       Nasopharyngeal   COVID-19 Virus PCR to [facility] - Result       Not Detected   Neutrophil Cytoplasmic Antibody      <1:10 titer    Neutrophil Cytoplasmic Antibody Pattern          CD19 B Cells      6 - 27 %    Absolute CD19      107 - 698 cells/uL    Myeloperoxidase Antibody IgG      0.0 - 0.9 AI    Proteinase 3 Antibody IgG      0.0 - 0.9 AI    Vitamin D Deficiency screening      20 - 75 ug/L    Sed Rate      0 - 30 mm/h    CRP Inflammation      0.0 - 8.0 mg/L    Albumin      3.4 - 5.0 g/dL    ALT      0 - 50 U/L    AST      0 - 45 U/L      Component      Latest Ref Rng & Units 5/7/2021   WBC      4.0 - 11.0 10e9/L 8.9   RBC Count      3.8 - 5.2 10e12/L 4.89   Hemoglobin      11.7 - 15.7 g/dL 12.7   Hematocrit      35.0 - 47.0 % 39.7   MCV      78 - 100 fl 81   MCH      26.5 - 33.0 pg 26.0 (L)   MCHC      31.5 - 36.5 g/dL 32.0   RDW      10.0 - 15.0 % 13.7   Platelet Count      150 - 450 10e9/L 336   Diff Method       Automated Method   % Neutrophils      % 65.3   % Lymphocytes      % 20.7   % Monocytes      % 7.8   % Eosinophils      % 5.3   % Basophils      % 0.7   % Immature Granulocytes      % 0.2   Nucleated RBCs      0 /100 0   Absolute Neutrophil      1.6 - 8.3 10e9/L 5.8   Absolute Lymphocytes      0.8 - 5.3 10e9/L 1.8   Absolute Monocytes      0.0 - 1.3 10e9/L 0.7   Absolute Eosinophils      0.0 - 0.7 10e9/L 0.5   Absolute Basophils      0.0 - 0.2 10e9/L 0.1   Abs Immature Granulocytes      0 - 0.4 10e9/L 0.0   Absolute Nucleated RBC       0.0   IGG      610 - 1,616 mg/dL 649   IGA      84 - 499 mg/dL 199   IGM      35 - 242 mg/dL 36   Creatinine      0.52 - 1.04  mg/dL 0.96   GFR Estimate      >60 mL/min/1.73:m2 66   GFR Estimate If Black      >60 mL/min/1.73:m2 77   COVID-19 Virus PCR to [facility] - Source          COVID-19 Virus PCR to [facility] - Result          Neutrophil Cytoplasmic Antibody      <1:10 titer <1:10   Neutrophil Cytoplasmic Antibody Pattern       The ANCA IFA is <1:10.  No further testing will be performed.   CD19 B Cells      6 - 27 % <1 (L)   Absolute CD19      107 - 698 cells/uL <1 (L)   Myeloperoxidase Antibody IgG      0.0 - 0.9 AI 1.1 (H)   Proteinase 3 Antibody IgG      0.0 - 0.9 AI <0.2   Vitamin D Deficiency screening      20 - 75 ug/L 41   Sed Rate      0 - 30 mm/h 9   CRP Inflammation      0.0 - 8.0 mg/L <2.9   Albumin      3.4 - 5.0 g/dL 4.1   ALT      0 - 50 U/L 28   AST      0 - 45 U/L 18     Lab on 07/08/2022   Component Date Value Ref Range Status     Sodium 07/08/2022 143  133 - 144 mmol/L Final     Potassium 07/08/2022 3.9  3.4 - 5.3 mmol/L Final     Chloride 07/08/2022 108  94 - 109 mmol/L Final     Carbon Dioxide (CO2) 07/08/2022 25  20 - 32 mmol/L Final     Anion Gap 07/08/2022 10  3 - 14 mmol/L Final     Urea Nitrogen 07/08/2022 17  7 - 30 mg/dL Final     Creatinine 07/08/2022 1.01  0.52 - 1.04 mg/dL Final     Calcium 07/08/2022 9.3  8.5 - 10.1 mg/dL Final     Glucose 07/08/2022 103 (A) 70 - 99 mg/dL Final     GFR Estimate 07/08/2022 65  >60 mL/min/1.73m2 Final    Effective December 21, 2021 eGFRcr in adults is calculated using the 2021 CKD-EPI creatinine equation which includes age and gender (Ryan et al., NEJ, DOI: 10.1056/RHZLpq5921160)     WBC Count 07/08/2022 12.0 (A) 4.0 - 11.0 10e3/uL Final     RBC Count 07/08/2022 4.94  3.80 - 5.20 10e6/uL Final     Hemoglobin 07/08/2022 12.6  11.7 - 15.7 g/dL Final     Hematocrit 07/08/2022 39.6  35.0 - 47.0 % Final     MCV 07/08/2022 80  78 - 100 fL Final     MCH 07/08/2022 25.5 (A) 26.5 - 33.0 pg Final     MCHC 07/08/2022 31.8  31.5 - 36.5 g/dL Final     RDW 07/08/2022 13.6  10.0 - 15.0 %  Final     Platelet Count 07/08/2022 350  150 - 450 10e3/uL Final     % Neutrophils 07/08/2022 66  % Final     % Lymphocytes 07/08/2022 22  % Final     % Monocytes 07/08/2022 9  % Final     % Eosinophils 07/08/2022 3  % Final     % Basophils 07/08/2022 0  % Final     % Immature Granulocytes 07/08/2022 0  % Final     NRBCs per 100 WBC 07/08/2022 0  <1 /100 Final     Absolute Neutrophils 07/08/2022 7.9  1.6 - 8.3 10e3/uL Final     Absolute Lymphocytes 07/08/2022 2.7  0.8 - 5.3 10e3/uL Final     Absolute Monocytes 07/08/2022 1.1  0.0 - 1.3 10e3/uL Final     Absolute Eosinophils 07/08/2022 0.3  0.0 - 0.7 10e3/uL Final     Absolute Basophils 07/08/2022 0.1  0.0 - 0.2 10e3/uL Final     Absolute Immature Granulocytes 07/08/2022 0.0  <=0.4 10e3/uL Final     Absolute NRBCs 07/08/2022 0.0  10e3/uL Final     Component      Latest Ref Rng & Units 8/19/2022   WBC      4.0 - 11.0 10e3/uL 12.6 (H)   RBC Count      3.80 - 5.20 10e6/uL 5.06   Hemoglobin      11.7 - 15.7 g/dL 12.8   Hematocrit      35.0 - 47.0 % 40.4   MCV      78 - 100 fL 80   MCH      26.5 - 33.0 pg 25.3 (L)   MCHC      31.5 - 36.5 g/dL 31.7   RDW      10.0 - 15.0 % 14.1   Platelet Count      150 - 450 10e3/uL 387   % Neutrophils      % 71   % Lymphocytes      % 20   % Monocytes      % 6   % Eosinophils      % 3   % Basophils      % 0   % Immature Granulocytes      % 0   NRBCs per 100 WBC      <1 /100 0   Absolute Neutrophils      1.6 - 8.3 10e3/uL 8.8 (H)   Absolute Lymphocytes      0.8 - 5.3 10e3/uL 2.5   Absolute Monocytes      0.0 - 1.3 10e3/uL 0.8   Absolute Eosinophils      0.0 - 0.7 10e3/uL 0.4   Absolute Basophils      0.0 - 0.2 10e3/uL 0.1   Absolute Immature Granulocytes      <=0.4 10e3/uL 0.1   Absolute NRBCs      10e3/uL 0.0   Color Urine      Colorless, Straw, Light Yellow, Yellow Straw   Appearance Urine      Clear Clear   Glucose Urine      Negative mg/dL 1000 (A)   Bilirubin Urine      Negative Negative   Ketones Urine      Negative mg/dL Negative    Specific Gravity Urine      1.003 - 1.035 1.020   Blood Urine      Negative Negative   pH Urine      5.0 - 7.0 5.0   Protein Albumin Urine      Negative mg/dL Negative   Urobilinogen mg/dL      Normal, 2.0 mg/dL Normal   Nitrite Urine      Negative Negative   Leukocyte Esterase Urine      Negative Negative   RBC Urine      <=2 /HPF 1   WBC Urine      <=5 /HPF 5   Squamous Epithelial /HPF Urine      <=1 /HPF <1   MPO Marline IgG Instrument Value      <3.5 U/mL 0.7   Myeloperoxidase Antibody IgG      Negative Negative   Proteinase 3 Marline IgG Instrument Value      <2.0 U/mL <1.0   Proteinase 3 Antibody IgG      Negative Negative   Protein Random Urine      g/L 0.11   Protein Total Urine g/gr Creatinine      0.00 - 0.20 g/g Cr 0.09   Creatinine Urine      mg/dL 128   IGG      610 - 1,616 mg/dL 641   IGA      84 - 499 mg/dL 204   IGM      35 - 242 mg/dL 32 (L)   Creatinine      0.52 - 1.04 mg/dL 1.24 (H)   GFR Estimate      >60 mL/min/1.73m2 51 (L)   CD19 B Cells      6 - 27 % <1 (L)   Absolute CD19      107 - 698 cells/uL <1 (L)   Neutrophil Cytoplasmic Antibody      <1:10 <1:10   Neutrophil Cytoplasmic Antibody Pattern       The ANCA IFA is <1:10.  No further testing will be performed.   AST      0 - 45 U/L 16   ALT      0 - 50 U/L 34   Albumin      3.4 - 5.0 g/dL 4.2   CRP Inflammation      0.0 - 8.0 mg/L 9.1 (H)   Sed Rate      0 - 30 mm/hr 15   Vitamin D Deficiency screening      20 - 75 ug/L 36     Component      Latest Ref Rng & Units 1/19/2023   WBC      4.0 - 11.0 10e3/uL 16.1 (H)   RBC Count      3.80 - 5.20 10e6/uL 5.39 (H)   Hemoglobin      11.7 - 15.7 g/dL 13.4   Hematocrit      35.0 - 47.0 % 41.9   MCV      78 - 100 fL 78   MCH      26.5 - 33.0 pg 24.9 (L)   MCHC      31.5 - 36.5 g/dL 32.0   RDW      10.0 - 15.0 % 15.4 (H)   Platelet Count      150 - 450 10e3/uL 383   % Neutrophils      % 79   % Lymphocytes      % 16   % Monocytes      % 4   % Eosinophils      % 1   % Basophils      % 0   % Immature  Granulocytes      % 0   NRBCs per 100 WBC      <1 /100 0   Absolute Neutrophils      1.6 - 8.3 10e3/uL 12.6 (H)   Absolute Lymphocytes      0.8 - 5.3 10e3/uL 2.6   Absolute Monocytes      0.0 - 1.3 10e3/uL 0.7   Absolute Eosinophils      0.0 - 0.7 10e3/uL 0.2   Absolute Basophils      0.0 - 0.2 10e3/uL 0.1   Absolute Immature Granulocytes      <=0.4 10e3/uL 0.1   Absolute NRBCs      10e3/uL 0.0   Sodium      136 - 145 mmol/L 137   Potassium      3.4 - 5.3 mmol/L 4.0   Chloride      98 - 107 mmol/L 98   Carbon Dioxide (CO2)      22 - 29 mmol/L 25   Anion Gap      7 - 15 mmol/L 14   Urea Nitrogen      6.0 - 20.0 mg/dL 23.6 (H)   Creatinine      0.51 - 0.95 mg/dL 1.09 (H)   Calcium      8.6 - 10.0 mg/dL 10.3 (H)   Glucose      70 - 99 mg/dL 232 (H)   Alkaline Phosphatase      35 - 104 U/L 95   AST      10 - 35 U/L 23   ALT      10 - 35 U/L 26   Protein Total      6.4 - 8.3 g/dL 7.7   Albumin      3.5 - 5.2 g/dL 4.9   Bilirubin Total      <=1.2 mg/dL 0.3   GFR Estimate      >60 mL/min/1.73m2 59 (L)   Color Urine      Colorless, Straw, Light Yellow, Yellow Light Yellow   Appearance Urine      Clear Clear   Glucose Urine      Negative mg/dL >=1000 (A)   Bilirubin Urine      Negative Negative   Ketones Urine      Negative mg/dL 10 (A)   Specific Gravity Urine      1.003 - 1.035 1.033   Blood Urine      Negative Negative   pH Urine      5.0 - 7.0 6.0   Protein Albumin Urine      Negative mg/dL Negative   Urobilinogen mg/dL      Normal, 2.0 mg/dL Normal   Nitrite Urine      Negative Negative   Leukocyte Esterase Urine      Negative Negative   Iron      37 - 145 ug/dL 51   Iron Binding Capacity      240 - 430 ug/dL 362   Iron Sat Index      15 - 46 % 14 (L)   Parathyroid Hormone Intact      15 - 65 pg/mL 51   Calcium Ionized Whole Blood      4.4 - 5.2 mg/dL 4.9   Ferritin      11 - 328 ng/mL 70   TSH      0.30 - 4.20 uIU/mL 0.40   3 views cervical spine radiographs 2/10/2023 4:10 PM     History: neck pain; Neck pain      Comparison: MRI cervical spine 4/14/2015.     Findings:  AP, lateral, and odontoid views of the cervical spine were obtained.     Down to the level of C7 is well visualized on the lateral view(s). The  cervicothoracic junction is partially visualized.     There is no acute osseous abnormality. No prevertebral soft tissue  swelling. Normal cervical lordosis is maintained.       Moderate loss of intervertebral disc height at C6-7. Mild loss of disc  height at C5-6. Additional multilevel degenerative changes including  endplate osteophytosis and uncinate hypertrophy. Dens is obscured by  the overlying maxilla on the odontoid view.     The visualized lung apices are clear.                                                                      Impression:  Multilevel cervical degenerative changes, greatest at C6-7.     I have personally reviewed the examination and initial interpretation  and I agree with the findings.     TASHI KELLY MD      Component      Latest Ref Rng & Units 2/10/2023   WBC      4.0 - 11.0 10e3/uL 11.9 (H)   RBC Count      3.80 - 5.20 10e6/uL 5.30 (H)   Hemoglobin      11.7 - 15.7 g/dL 13.2   Hematocrit      35.0 - 47.0 % 40.7   MCV      78 - 100 fL 77 (L)   MCH      26.5 - 33.0 pg 24.9 (L)   MCHC      31.5 - 36.5 g/dL 32.4   RDW      10.0 - 15.0 % 15.3 (H)   Platelet Count      150 - 450 10e3/uL 415   % Neutrophils      % 69   % Lymphocytes      % 23   % Monocytes      % 6   % Eosinophils      % 2   % Basophils      % 0   % Immature Granulocytes      % 0   NRBCs per 100 WBC      <1 /100 0   Absolute Neutrophils      1.6 - 8.3 10e3/uL 8.1   Absolute Lymphocytes      0.8 - 5.3 10e3/uL 2.7   Absolute Monocytes      0.0 - 1.3 10e3/uL 0.8   Absolute Eosinophils      0.0 - 0.7 10e3/uL 0.2   Absolute Basophils      0.0 - 0.2 10e3/uL 0.0   Absolute Immature Granulocytes      <=0.4 10e3/uL 0.0   Absolute NRBCs      10e3/uL 0.0   Sodium      136 - 145 mmol/L 138   Potassium      3.4 - 5.3 mmol/L 4.1    Chloride      98 - 107 mmol/L 99   Carbon Dioxide (CO2)      22 - 29 mmol/L 23   Anion Gap      7 - 15 mmol/L 16 (H)   Urea Nitrogen      6.0 - 20.0 mg/dL 24.6 (H)   Creatinine      0.51 - 0.95 mg/dL 1.07 (H)   Calcium      8.6 - 10.0 mg/dL 10.5 (H)   Glucose      70 - 99 mg/dL 205 (H)   Alkaline Phosphatase      35 - 104 U/L 86   AST      10 - 35 U/L 26   ALT      10 - 35 U/L 30   Protein Total      6.4 - 8.3 g/dL 7.6   Albumin      3.5 - 5.2 g/dL 4.8   Bilirubin Total      <=1.2 mg/dL 0.2   GFR Estimate      >60 mL/min/1.73m2 61   MPO Marline IgG Instrument Value      <3.5 U/mL 0.8   Myeloperoxidase Antibody IgG      Negative Negative   Proteinase 3 Marline IgG Instrument Value      <2.0 U/mL <1.0   Proteinase 3 Antibody IgG      Negative Negative   IGG      610 - 1,616 mg/dL 680   IGA      84 - 499 mg/dL 197   IGM      35 - 242 mg/dL 30 (L)   CD19 B Cells      6 - 27 % <1 (L)   Absolute CD19      107 - 698 cells/uL <1 (L)   Neutrophil Cytoplasmic Antibody      <1:10 <1:10   Neutrophil Cytoplasmic Antibody Pattern       The ANCA IFA is <1:10.  No further testing will be performed.   % Retic      0.5 - 2.0 % 1.4   Absolute Retic      0.025 - 0.095 10e6/uL 0.073   CRP Inflammation      <5.00 mg/L 6.67 (H)   Sed Rate      0 - 30 mm/hr 12   Lipase      13 - 60 U/L 26     ROS: A comprehensive ROS was done. Positives are per HPI.      HISTORY REVIEW:  Past Medical History:   Diagnosis Date     Abnormal glandular Papanicolaou smear of cervix 1992     Allergic rhinitis     Allergy, airborne subst     Arthritis      ASCVD (arteriosclerotic cardiovascular disease)      Chronic pain      Chronic pancreatitis (H)     idiopathic, Tx: PPI, antioxidants, pancreatic enzymes     Common migraine      Coronary artery disease      Costochondritis      Difficulty in walking(719.7)      Dyspnea on exertion      Ectasia, mammary duct     [facility], [facility] nipple discharge     Elevated fasting glucose       Gastro-oesophageal reflux disease      Granulomatosis with polyangiitis (H)      Hernia      History of angina      Hyperlipidemia LDL goal < 100      Hypertension goal BP (blood pressure) < 140/90     Essential hypertension     Iron deficiency anemia      Ischemic cardiomyopathy      Menorrhagia      Migraine headaches      Mild persistent asthma      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 Matias, neg w/u, Rx'd w/prednisone     Ventral hernia, unspecified, without mention of obstruction or gangrene 2012     Past Surgical History:   Procedure Laterality Date     C/SECTION, LOW TRANSVERSE  1996         CARDIAC SURGERY      cardiac stent- recent in 16; 4 other stents     COLONOSCOPY N/A 2023    Procedure: COLONOSCOPY, WITH POLYPECTOMY;  Surgeon: Percy Gross MD;  Location: [facility] OR     DILATION AND CURETTAGE N/A 2016    Procedure: DILATION AND CURETTAGE;  Surgeon: Nahed Butler MD;  [facility]     ENDOMETRIAL SAMPLING (BIOPSY) N/A 2023    Procedure: ENDOMETRIAL BIOPSY;  Surgeon: Nahed Butler MD;  [facility]     ESOPHAGOSCOPY, GASTROSCOPY, DUODENOSCOPY (EGD), COMBINED N/A 2022    Procedure: ESOPHAGOGASTRODUODENOSCOPY, WITH BIOPSY;  Surgeon: Enzo Sesay MD;  [facility]: [facility]      ESOPHAGOSCOPY, GASTROSCOPY, DUODENOSCOPY (EGD), COMBINED N/A 5/25/2023    Procedure: ESOPHAGOGASTRODUODENOSCOPY, WITH BIOPSY;  Surgeon: Percy Gross MD;  Location: [facility] OR     EXAM UNDER ANESTHESIA PELVIC N/A 4/28/2023    Procedure: EXAM UNDER ANESTHESIA;  Surgeon: Nahed Butler MD;  [facility]     HC UGI ENDOSCOPY W EUS  11/07/2008    Dr. Pastrana, possible chronic pancreatitis, fatty liver     HERNIA REPAIR  12/01/2012    done at [facility] [place]     INSERT INTRAUTERINE DEVICE N/A 07/06/2016    Procedure: INSERT INTRAUTERINE DEVICE;  Surgeon: Nahed Butler MD;  [facility]     INT UTERINE FIBRIOD EMBOLIZATION  10/29/2014     LAPAROSCOPIC CHOLECYSTECTOMY  05/28/2008    Dr. Arnol GRUBBS TX, CERVICAL  1992    s/p LEEP, done at [facility] in [place].     ORBITOTOMY Right 03/15/2016    Procedure: ORBITOTOMY;  Surgeon: Myron Cyr MD;  Location: [facility]     ORBITOTOMY Right 08/04/2017    Procedure: ORBITOTOMY;  RIGHT ORBITOTOMY AND BIOPSY;  Surgeon: Charis Holbrook MD;  Location: [facility]     REMOVE INTRAUTERINE DEVICE N/A 4/28/2023    Procedure: Remove intrauterine device,;  Surgeon: Nahed Butler MD;  [facility]     REPAIR PTOSIS Right 11/17/2017    Procedure: REPAIR PTOSIS;  RIGHT UPPER LID PTOSIS REPAIR;  Surgeon: Myron Cyr MD;  Location: [facility]     UPPER GI ENDOSCOPY  10/21/2008    mild gastritis, Dr. Rocky [facility] ECHO HEART XTHORACIC,COMPLETE, W/O DOPPLER  02/04/2004    [facility] WNL, EF 60%     Family History   Problem Relation Age of Onset     Hypertension Mother      Arthritis Mother      Heart Disease Mother         long qt syndrome     Thyroid Disease Mother      C.A.D. Mother      Heart Disease Father 50        heart attack     Cerebrovascular Disease Father      Diabetes Father      Hypertension Father      Depression Father      C.A.D. Father      Neurologic Disorder Sister         migraines     Neurologic Disorder Sister          migraines     Heart Disease Brother 15        MI at 15, 16.      Arthritis Brother         he passed away, had severe arthritis at age 11     C.A.D. Brother      Anesthesia Reaction Son         PONV     Respiratory Son         asthma     Hypertension Maternal Aunt      Diabetes Maternal Uncle      Hypertension Maternal Uncle      Arthritis Maternal Uncle      Eye Disorder Maternal Uncle         cataracts     Cerebrovascular Disease Maternal Uncle      Family History Negative Other         neg for RA, SLE     Unknown/Adopted No family hx of         unknown neurological issues in her family, mother was adopted     Skin Cancer No family hx of         No known family hx of skin cancer     Deep Vein Thrombosis (DVT) No family hx of      Social History     Socioeconomic History     Marital status: Single     Spouse name: Not on file     Number of children: 1     Years of education: Not on file     Highest education level: Not on file   Occupational History     Employer: NONE    Tobacco Use     Smoking status: Some Days     Current packs/day: 0.00     Average packs/day: 0.2 packs/day for 1 year (0.2 ttl pk-yrs)     Types: Cigarettes     Start date: 2015     Last attempt to quit: 2016     Years since quittin.9     Smokeless tobacco: Never   Vaping Use     Vaping status: Every Day     Substances: Nicotine   Substance and Sexual Activity     Alcohol use: No     Alcohol/week: 0.0 standard drinks of alcohol     Drug use: No     Sexual activity: Not Currently   Other Topics Concern     Parent/sibling w/ CABG, MI or angioplasty before 65F 55M? Yes   Social History Narrative    Balanced Diet - sometimes    Osteoporosis Prevention Measures - Dairy servings per day: 2 servings weekly    Regular Exercise -  Yes Describe walking 4 times a week    Dental Exam - NO    Seatbelts used - Yes    Self Breast Exam - Yes    Abuse: Current or Past (Physical, Sexual or Emotional)- No    Do you have any concerns about STD's -   No    Do you feel safe in your environment - No    Guns stored in the home - No    Sunscreen used - Yes    Lipids -  YES - Date: 053102    Glucose -  YES - Date: 051644    Eye Exam - YES - Date: one year ago    Colon Cancer Screening - No    Hemoccults - NO    Pap Test -  YES - Date: 070904, remote history of LEEP    Mammography - YES - Date: last spring, would like to discuss, needs a referral [facility]    DEXA - NO    Immunizations reviewed and up to date - Yes, last td given in 1997 or 1998     Social Drivers of Health     Financial Resource Strain: Low Risk  (2/9/2024)    Financial Resource Strain      Within the past 12 months, have you or your family members you live with been unable to get utilities (heat, electricity) when it was really needed?: No   Food Insecurity: High Risk (2/9/2024)    Food Insecurity      Within the past 12 months, did you worry that your food would run out before you got money to buy more?: Yes      Within the past 12 months, did the food you bought just not last and you didn t have money to get more?: No   Transportation Needs: Low Risk  (2/9/2024)    Transportation Needs      Within the past 12 months, has lack of transportation kept you from medical appointments, getting your medicines, non-medical meetings or appointments, work, or from getting things that you need?: No   Physical Activity: Not on file   Stress: Not on file   Social Connections: Not on file   Interpersonal Safety: Low Risk  (2/9/2024)    Interpersonal Safety      Do you feel physically and emotionally safe where you currently live?: Yes      Within the past 12 months, have you been hit, slapped, kicked or otherwise physically hurt by someone?: No      Within the past 12 months, have you been humiliated or emotionally abused in other ways by your partner or ex-partner?: No   Housing Stability: Low Risk  (2/9/2024)    Housing Stability      Do you have housing? : Yes      Are you worried about  losing your housing?: No     Patient Active Problem List   Diagnosis     Seasonal affective disorder     Allergic rhinitis     PCOS (polycystic ovarian syndrome)     Moderate persistent asthma     Chronic pancreatitis (H)     Hypertension goal BP (blood pressure) < 140/90     Common migraine     NSTEMI (non-ST elevated myocardial infarction) (H)     Hyperlipidemia LDL goal <70     ASCVD (arteriosclerotic cardiovascular disease)     GERD (gastroesophageal reflux disease)     Ischemic cardiomyopathy     Hypertensive heart disease     Uterine leiomyoma     Iron deficiency anemia     Costochondritis     Vitamin D deficiency     Breast cancer screening     Spinal stenosis in cervical region     Fibromyalgia     Seronegative rheumatoid arthritis (H)     Type 2 diabetes, HbA1C goal < 8% (H)     Type 2 diabetes mellitus with other specified complication (H)     Hyperlipidemia associated with type 2 diabetes mellitus (H)     Hypertension associated with diabetes (H)     Overweight with body mass index (BMI) 25.0-29.9     Tobacco use disorder     Telogen effluvium     CAD S/P percutaneous coronary angioplasty     Status post coronary angiogram     ANCA-associated vasculitis (H)     Wegener's vasculitis     Type 1 diabetes mellitus with complications (H)     Chest discomfort     Urinary frequency     Abdominal pain, epigastric     Hypokalemia     Leukocytosis, unspecified type     Acute pancreatitis, unspecified complication status, unspecified pancreatitis type       Pregnancy Hx: She is . All miscarriages were in first trimester. H/o OC use in the past. 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    PMHx, FHx, SHx were reviewed, unchanged.    Outpatient Encounter Medications as of 2025   Medication Sig Dispense Refill     acetaminophen (TYLENOL) 325 MG tablet Take 1-2 tablets (325-650 mg) by mouth every 6 hours as needed for pain (headache) 250 tablet 4     ADVAIR DISKUS 250-50 MCG/ACT inhaler Inhale  1 puff into the lungs 2 times daily       albuterol (2.5 MG/3ML) 0.083% neb solution INL 1 VIAL VIA NEBULIZATION Q 4 TO 6 HOURS PRN  1     albuterol (PROAIR HFA, PROVENTIL HFA, VENTOLIN HFA) 108 (90 BASE) MCG/ACT inhaler Inhale 2 puffs into the lungs every 6 hours as needed for shortness of breath / dyspnea or wheezing 3 Inhaler 1     BANOPHEN 25 MG tablet Take 25 mg by mouth At Bedtime       blood glucose (NO BRAND SPECIFIED) lancets standard Use to test blood sugar 1-4 times daily or as directed. 400 each 3     blood glucose (NO BRAND SPECIFIED) test strip Use to test blood sugar fasting each am and predinner daily. 250 strip 3     blood glucose monitoring (NO BRAND SPECIFIED) meter device kit Use to test blood sugar 2 times daily or as directed. 1 kit 0     Blood Pressure Monitor KIT 1 each daily Monitor home blood pressure as instructed by physician.  Dispense [place] blood pressure kit. 1 kit 0     calcium carbonate (TUMS) 500 MG chewable tablet Take 3-4 tablets (1,500-2,000 mg) by mouth daily as needed 90 tablet 3     clopidogrel (PLAVIX) 75 MG tablet Take 1 tablet (75 mg) by mouth daily. . 30 tablet 3     clotrimazole (MYCELEX) 10 MG lozenge Place 1 lozenge (10 mg) inside cheek 5 times daily 50 lozenge 1     cyanocobalamin (VITAMIN B-12) 1000 MCG tablet Take 1 tablet by mouth daily at 2 pm       cyclobenzaprine (FLEXERIL) 10 MG tablet Take 1 tablet (10 mg) by mouth 2 times daily as needed for muscle spasms 60 tablet 3     cycloSPORINE (RESTASIS) 0.05 % ophthalmic emulsion 1 month supply 11 refills 1 each 11     dicyclomine (BENTYL) 20 MG tablet TAKE 1 TABLET(20 MG) BY MOUTH FOUR TIMES DAILY AS NEEDED. 60 tablet 4     empagliflozin (JARDIANCE) 25 MG TABS tablet Take 1 tablet (25 mg) by mouth daily 90 tablet 2     EPIPEN 2-RIKY 0.3 MG/0.3ML injection INJECT 0.3 MG INTO THE MUSCLE PRF ANAPHYALAXIS  0     esomeprazole (NEXIUM) 40 MG DR capsule Take 1 capsule (40 mg) by mouth 2 times daily Take 30-60 minutes before  eating. 180 capsule 0     estradiol (VAGIFEM) 10 MCG TABS vaginal tablet Place 1 tablet (10 mcg) vaginally twice a week 24 tablet 3     fluticasone (FLONASE) 50 MCG/ACT nasal spray Spray 1 spray in nostril as needed       folic acid (FOLVITE) 1 MG tablet TAKE 1 TABLET BY MOUTH EVERY DAY 90 tablet 0     insulin glargine (LANTUS PEN) 100 UNIT/ML pen Inject 56 Units Subcutaneous every morning Or as directed. 75 mL 1     insulin pen needle (BD PEN NEEDLE MARCK 2ND GEN) 32G X 4 MM miscellaneous USE ONE PEN NEEDLE DAILY OR AS DIRECTED 100 each 2     lisinopril-hydrochlorothiazide (ZESTORETIC) 20-25 MG tablet Take 1 tablet by mouth daily. 30 tablet 3     magnesium 250 MG tablet TAKE 1 TABLET(250 MG) BY MOUTH TWICE DAILY 60 tablet 3     Magnesium Oxide 250 MG tablet TAKE 1 TABLET(250 MG) BY MOUTH TWICE DAILY 60 tablet 3     metFORMIN (GLUCOPHAGE XR) 500 MG 24 hr tablet Take 4 tablets (2,000 mg) by mouth daily (with dinner) 360 tablet 3     metoprolol succinate ER (TOPROL XL) 100 MG 24 hr tablet Take 1 tablet (100 mg) by mouth daily. 30 tablet 3     Multiple Vitamin (DAILY-GERALD MULTIVITAMIN) TABS TAKE 1 TABLET BY MOUTH EVERY  tablet 3     olopatadine (PATANOL) 0.1 % ophthalmic solution Place 1 drop into both eyes as needed       ondansetron (ZOFRAN ODT) 8 MG ODT tab Take 1 tablet (8 mg) by mouth every 8 hours as needed for nausea. 20 tablet 1     pravastatin (PRAVACHOL) 40 MG tablet TAKE 1 TABLET BY MOUTH EVERY DAY 30 tablet 3     pregabalin (LYRICA) 25 MG capsule Take 2 capsules (50 mg) by mouth daily. 180 capsule 1     sennosides (SENOKOT) 8.6 MG tablet 1-2 tabs a day as needed for constipation 60 tablet 1     spironolactone (ALDACTONE) 25 MG tablet TAKE 1 TABLET (25 MG) BY MOUTH DAILY. MAY ALSO TAKE 0.5 TABLETS (12.5 MG) DAILY AS NEEDED (FOR WEIGHT GAIN). 45 tablet 3     sucralfate (CARAFATE) 1 GM tablet Take 1 tablet (1 g) by mouth 4 times daily 120 tablet 3     traMADol (ULTRAM) 50 MG tablet TAKE 1 TABLET(50 MG)  "BY MOUTH EVERY 8 HOURS AS NEEDED FOR SEVERE PAIN 60 tablet 3     No facility-administered encounter medications on file as of 1/23/2025.       Allergies   Allergen Reactions     Amoxicillin-Pot Clavulanate      Unknown possible hives and edema     Amoxicillin-Pot Clavulanate      Artificial Sweetner (Informational Only)  Other (See Comments)     Increased headache     Azithromycin      Clavulanic Acid      Diatrizoate Other (See Comments)     Pt wants this listed because she is allergic to shellfish      Imitrex [Triptans]      Severe face/neck/chest tightness and flushing side effects      Penicillins Hives     Unknown      Pork Allergy      Stomach pain, cramping, diarrhea, itching, nausea and headaches     Shellfish Allergy Hives and Swelling     Shellfish-Derived Products      Sulfa Antibiotics Hives and Swelling     Sulfatolamide      Zithromax [Azithromycin Dihydrate] Swelling     Unknown        Ph.E:    Ht 1.588 m (5' 2.5\")   Wt 72.6 kg (160 lb)   LMP  (LMP Unknown)   BMI 28.80 kg/m      GA: NAD, pleasant  HEENT: nl conj, sclera, EOMI. no campos-orbital edema under R eye  MSK: no synovitis  Skin: no rash, thin hair  Neuro: non focal  Psych: nl affect    Assessment/Plan:    1-Seropositive non-erosive RA (arthritis, AM stiffness, high CRP, RF 26 but re-check neg 3/2015 on HCQ) Dx 5/2013, FMS, new pleuritic CP 3/20-15-5/2015 resolved on steroids. She is on  mg bid since 5/2014. She tolerates it well and it's helping some but not enough to control all her pain. Continued having flare ups of joint pain, could be GPA related. Some pain is due to FMS.My impression is that her arthralgias are a combination of RA/ IA sec GPA, fibromyalgia and chronic pain.     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 Prednisone was not a good option " for her given weight gain, diabetes. She tried and did not tolerate MTX, AZA.    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 Repeat MPO was positive in 6/2017.    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 Shah and Dr. Valdez at [facility]. 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     She is on rituximab since 12/12/2018 with great response, minor allergic reaction which could be managed by pre-meds and extra steroid dose. Developed high BG and vaginal yeast infections after solumedrol premed. Treated candida with fluconazole. Will decrease solumerdol dose to IV 80 mg prior to receiving rituximab. 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 However, feels Rituximab effects wear off around 4 months. According to ACR guidelines can give every 4-6 months. Pt elected to received this upcoming September which will be approximately 5 months from last dose. Repeat Orbit CT in September 2021 demonstrated improvement.     Last visit in April 2022-- Recommended marie given b cell depletion tx as rituximab blocks the response to covid vaccine, she is concerned about side effects. I advised her to discuss with [facility] pharmacist.      1/25/2023: Janine has been ill since Dx of COVID on 12/17/2022. Her most recent labs were reviewed, stable vasculitis labs in 8/2022 with CD19<1, neg vasculitis markers. In 1/2023, no anemia and nl thyroid function test. I ordered vit D as add on. 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 She does need to follow up on abnormal thyroid US and mammogram as well. Our plan for ANCA vasculitis was to give rituximab 1 gram [id number] as benefit did not last 6 months with last rituximab on 9/7/2022, but today we both agreed to give next dose in March after 6 months to let her body heal from COVID. I will see  her in person, in early march to determine if it is ok to give another dose of rituxiamb. 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 Also ordered shower chair, commode per her request given significant fatigue, body pain.    3/9/2023: S/p last rituximab 1 gram on 9/7/2022. 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 Stable labs and eye inflammation.    2-Fat pads. She was seen by endocrinology and cushing was ruled out in 4/2014. Was advised to do f/u for enlarged thyroid/thyroid nodules.  3-Hair loss. Continue with dermatology  4-Chronic pain. F/U with pain clinic  5-Vit D deficiency. Was replaced with vit D 50, 000 units po qwk x 12 wk then 2000 units every day  6-?HCQ toxicity on OCT 7/2021, now off HCQ, could explain increased arthralgia  7- Chronic Headaches. Symptoms worsen after taking zofran. Has received compazine in hospital in the past without adverse effect. Will have patient trial Compazine       3/2023 plan:    Spine referral for abnormal C spine (DJD) in 2/2023.    Rituximab 1 gram one dose only this month    Labs in May 2023    Follow up eye exam 8/2023    Return in person in about 5-6 months       9/6/2023: last rituximab 1 gram one dose for ANCA vasculitis on 3/28/2023, increased arthralgia, will try rituximab at full dose, zanaflex and consider resuming HCQ as last eye exam did rule out HCQ toxicity (HCQ was stopped with concern for possible HCQ toxicity).    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 Will schedule rituximab in Feb, pain mx was discussed. Labs are stable.    S/p rituximab 1 gram on 9/19/2023, 10/3/2023.    Plan:      Rituximab 1 gram every 2 weeks x 2 infusion in Feb for ANCA vasculitis    Labs with next rituximab infusion    X-rays today    Acupuncture referral    Return in person in about 4 months      6/5/2024: more joint pain, rituximab does not last 6 months, will move up appointment. Will check orbit CT. Stable labs. I asked her PCP to help with pain mx.    Labs today    Move up rituximab 1 gram once from Aug to June 2024, our pharmacist will contact you    CT orbit with no contrast    Return in about 3-4 months in person      10/11/2024:    GPA is stable on rituximab q6 mo, suspect knee pain to be from OA, pain sx to be from neuropathy. Discussed mx, going up on lyrica,s he is concerned about SE like weight gain, but willing to try increasing a little bit. Recommend follow up with PCP for pain mx, acupuncture and LDN could be good options.    Plan:    Rituximab 1 gram every 2 weeks in March 2025       Refer to ortho, knee specialist      Go up on lyrica to 50 mg a day      Return in about 4-5 months (in person)        Today 1/23/2025:    Stable GPA on rituximab, pain seems to be neuropathy related, failed lyrica. She wishes to do follow up with PCP for pain mx.    Plan:    Rituximab 1 gram q2wk x 2 in March    Labs with rituximab    Follow up with Dr. Solano for pain management, consider LDN, TENS unit is a good idea    Ok to come off lyrica, bt taper it off slowly    Return in person in about 4-5 months    TT 30 min was spent on date of the encounter doing chart review, history and exam, documentation and further activities as noted above. Any prior notes, outside records, laboratory results, and imaging studies were reviewed if relevant.      The longitudinal plan of care for the diagnosis(es)/condition(s) as documented  were addressed during this visit. Due to the added complexity in care, I will continue to support Janine in the subsequent management and with ongoing continuity of care.      Orders Placed This Encounter   Procedures     AST     ALT     Myeloperoxidase and Proteinase 3 Panel     Albumin level     Creatinine     CRP inflammation     UA with Microscopic reflex to Culture     Protein  random urine     Creatinine random urine     Erythrocyte sedimentation rate auto     CD19 B Cell Count     ANCA IgG by IFA with Reflex to Titer     Immunoglobulins A G and M     CBC with Platelets & Differential        Majo Mckinnon MD          Again, thank you for allowing me to participate in the care of your patient.      Sincerely,    Majo Mckinnon MD

## 2025-01-23 NOTE — PROGRESS NOTES
Joined the call at 1/23/2025, 2:42:46 pm.  Left the call at 1/23/2025, 2:58:35 pm.  Virtual Visit Details    Originating Location (pt. Location): Home  Distant Location (provider location):  off-site  Platform used for Video Visit: [facility]      Rheumatology F/U Visit Note    Last visit note: 10/11/2024    Today's visit date: 1/23/2025    Reason for in person visit: F/U GPA, high risk med use    HPI     Ms. 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 She is s/p tx with HCQ since 5/2014, now off due to abnormal eye exam. On rituximab since 12/20218 with great response. Previously tried and did not tolerate MTX, AZA.    She had lateral orbitotomy and debulking orbital mass right eye on 9/26/18 with Dr. Shah and Dr. Valdez at [facility]. 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 There were no complications according to the op note.      Today 1/23/2025:    Continues to have pain over joints with neuropathy, lyrica did not help, likes to stop it. Has Rx for TENS unit, will use it     10/11/2024:    -lots of pain affecting feet, legs    -has not left house since June, bottom of feet swell up    -hair is coming out    -rituximab does not help with pain    -has swelling inside mouth    -the other day, R side of face swelled up    -after last rituxiamb, had candidia infection under breast, BG was down    -had sweating fater rituximab    -lyrica is not helping    9/6, 9/26 rituximab 1 gram    6/5/2024:    -reports arm weakness    -has back pain    -continues to have joint pain    -has more headaches, R eye pain    -neuropathy is an issue     1/31/2024:      Nortriptyline was prescribed by another provider, but has not tried it yet    R side of her face started swelling again.    Reports pain over neck, hips, knees and hands.          9/6/2023:    -reports pain/swelling over hands/feet, has more arthritis  problem over past year  -gets numbness over toes, bottom of feet, can't see neurology till 1/2024  -walking causes pain in the feet  -gets pain over hips after a trip to [facility], it is a new problem  -sometimes gets swelling over R cheek, noticed it yesterday outside with heat, her face was also bright red        3/9/2023: Reports pain/swelling of her hands. S/p last rituximab 1 gram on 9/7/2022.       1/25/2023: Janine is doing a phone visit for follow up, was feeling ill and switched in person to phone visit. She got sick around [facility], saw her PCP on 12/17, was tested positive for COVID, it took a longtime to feel better, did not take paxlovid as it was already past 2 weeks. Since then, she has not been feeling well. Has headaches, body ache, her eyes especially R eye look pink, they burn and itch a lot. Has sense of smell but can not taste her food. She is very tired, hardly leaves her house. Last time, left house for doctor visit, was tired and sore. Has tingling and pinprick feeling over arms and legs. Has upcoming follow up with PCP on 2/10. She had thyroid US for thyroid nodules. She had abnormal screening mammogram on 1/9, will go back for diagnostic mammogram and US of L breast on 1/31.          08/19/2022  Reports fatigue and headaches. Headache worsens after taking Zofran for nausea. Joint symptoms come and go. Last rituximab on 4/6/22. Rituximab is helpful but feels like it wears off prior to the 6 months. Next appointment scheduled for October 2022. Develops intermittent vaginal yeast infection and thrush related to rising blood glucose. Right eye without symptoms. No new fevers, chills, skin changes. Son is wondering if she takes herbal supplements for headaches will this interact with any of her medications. Scheduled to meet with pharmacist today.        4/22/2022:   Each rituximab infusion causes vaginal yeast infection and thrush related to rise in BG. Her BG has stayed in higher level since  last rituximab. Has more ache and pain, myalgia and arthralgia. Recently has had headaches with high BP. Had last rituximab 1000 mg on 4/6.Her R eye is itching and swelling up.  Today, her BG is 177.Has gained 20 lbs since Feb 2022. Had severe pain in her arm after covid vaccine, it took a month to get better.      12/16/2021: Janine presents for follow up. Reports ESOB with cold, has ongoing joint pain. R eye pain is intermittent.  Reports headaches after rituximab 1 gram on 10/18/2021.  Last week, her R knee was hurting.      8/20/2021: Janine has pain over arms, knees. Some days, feel stiff all day long. Some days can't do stairs. Hard to tell if there is swelling as has more water retention during summer.  Some days, eye swells up like today. No fevers, SOB, CP or cough.  Has rosacea but some days wakes up with heat rash over sun. Her face is peeling.  Sometimes can't lift things or grab things with pain from elbow to hands. Has shoulder and neck pain but this forearm pain is new.  Stopped HCQ in mid July due to concern for potential HCQ toxicity on OCT, her eye exam with Dr. Vernon has been re-scheduled and upcoming on 9/14 to address HCQ toxicity, pain is not worse off HCQ.  Not COVID vaccinated but is cautious.      5/10/2021: Joint ache, knees hurt, R elbow hurts, R arm hurts, R hand hurts. Has lots of swelling in her joints especially hands.    Depending on weather, joints jhurt, sometimes pain is 7/10.  Has problem with taking stairs and balance.  Gets off balance.   R eye gets tinglin, swelling.  Gets out of breath, gets worse with seasonal allergies.  Over past month and half, took nitro more, like 8 times.  No hemooptysis, no hematuria.  Has allergies.      4/21/2021: Had last rituximab 1 gram on 1/19, 2/2/2021. Reports rituximab starts to wear off earlier, has more sx this time. Eye swelling is intermittent. Gets indigestion/ GERD sx with constipation after the infusion.  She reports having asthma, swelling  of neck, arms. She thinks that it could be from her vasculitis. She is worried about going down on rituximab dose.   Otherwise unchanged sx, no SOB or hemoptysis or persistent cough, or   Somedays her knees and hips hurt a lot, feel like bone on bone in her knees, especially on changing position.      Component      Latest Ref Rng 2/28/2013 2/28/2013          12:14 PM 12:14 PM   WBC      4.0 - 11.0 10e9/L     RBC Count      3.8 - 5.2 10e12/L     Hemoglobin      11.7 - 15.7 g/dL     Hematocrit      35.0 - 47.0 %     MCV      78 - 100 fl     MCH      26.5 - 33.0 pg     MCHC      31.5 - 36.5 g/dL     RDW      10.0 - 15.0 %     Platelet Count      150 - 450 10e9/L     Specimen Description           Lyme Screen IgG and IgM           Vitamin D Deficiency screening      30 - 75 ug/L     Ferritin      10 - 300 ng/mL     Sed Rate      0 - 20 mm/h     ALLI Screen by EIA      <1.0     Rheumatoid Factor      0 - 14 IU/mL     CK Total      30 - 225 U/L  78   Uric Acid      2.5 - 7.5 mg/dL 6.7      Component      Latest Ref Rng 2/28/2013          12:14 PM   WBC      4.0 - 11.0 10e9/L    RBC Count      3.8 - 5.2 10e12/L    Hemoglobin      11.7 - 15.7 g/dL    Hematocrit      35.0 - 47.0 %    MCV      78 - 100 fl    MCH      26.5 - 33.0 pg    MCHC      31.5 - 36.5 g/dL    RDW      10.0 - 15.0 %    Platelet Count      150 - 450 10e9/L    Specimen Description       Serum   Lyme Screen IgG and IgM       Test value: <0.75....Interpretation: Negative....If you highly suspect Lyme . . .   Vitamin D Deficiency screening      30 - 75 ug/L    Ferritin      10 - 300 ng/mL    Sed Rate      0 - 20 mm/h    ALLI Screen by EIA      <1.0    Rheumatoid Factor      0 - 14 IU/mL    CK Total      30 - 225 U/L    Uric Acid      2.5 - 7.5 mg/dL      Component      Latest Ref Rng 2/28/2013 2/28/2013 2/28/2013 2/28/2013          12:14 PM 12:14 PM 12:14 PM 12:14 PM   WBC      4.0 - 11.0 10e9/L       RBC Count      3.8 - 5.2 10e12/L       Hemoglobin      11.7  - 15.7 g/dL       Hematocrit      35.0 - 47.0 %       MCV      78 - 100 fl       MCH      26.5 - 33.0 pg       MCHC      31.5 - 36.5 g/dL       RDW      10.0 - 15.0 %       Platelet Count      150 - 450 10e9/L       Specimen Description             Lyme Screen IgG and IgM             Vitamin D Deficiency screening      30 - 75 ug/L       Ferritin      10 - 300 ng/mL    10   Sed Rate      0 - 20 mm/h   23 (H)    ALLI Screen by EIA      <1.0  <1.0 . . .     Rheumatoid Factor      0 - 14 IU/mL 26 (H)      CK Total      30 - 225 U/L       Uric Acid      2.5 - 7.5 mg/dL         5/2013  CBC WITH PLATELETS DIFFERENTIAL       Component Value Range    WBC 11.3 (*) 4.0 - 11.0 10e9/L    RBC Count 4.56  3.8 - 5.2 10e12/L    Hemoglobin 11.1 (*) 11.7 - 15.7 g/dL    Hematocrit 34.3 (*) 35.0 - 47.0 %    MCV 75 (*) 78 - 100 fl    MCH 24.3 (*) 26.5 - 33.0 pg    MCHC 32.4  31.5 - 36.5 g/dL    RDW 16.1 (*) 10.0 - 15.0 %    Platelet Count 315  150 - 450 10e9/L    Diff Method Automated Method      % Neutrophils 71.6  40 - 75 %    % Lymphocytes 20.9  20 - 48 %    % Monocytes 4.3  0 - 12 %    % Eosinophils 2.8  0 - 6 %    % Basophils 0.2  0 - 2 %    % Immature Granulocytes 0.2  0 - 0.4 %    Absolute Neutrophil 8.1  1.6 - 8.3 10e9/L    Absolute Lymphocytes 2.4  0.8 - 5.3 10e9/L    Absolute Monoctyes 0.5  0.0 - 1.3 10e9/L    Absolute Eosinophils 0.3  0.0 - 0.7 10e9/L    Absolute Basophils 0.0  0.0 - 0.2 10e9/L    Abs Immature Granulocytes 0.0  0 - 0.03 10e9/L   AMYLASE       Component Value Range    Amylase 103  30 - 110 U/L   COMPREHENSIVE METABOLIC PANEL       Component Value Range    Sodium 144  133 - 144 mmol/L    Potassium 3.8  3.4 - 5.3 mmol/L    Chloride 105  94 - 109 mmol/L    Carbon Dioxide 23  20 - 32 mmol/L    Anion Gap 17  6 - 17 mmol/L    Glucose 173 (*) 60 - 99 mg/dL    Urea Nitrogen 13  5 - 24 mg/dL    Creatinine 0.83  0.52 - 1.04 mg/dL    GFR Estimate 74  >60 mL/min/1.7m2    GFR Estimate If Black 90  >60 mL/min/1.7m2     Calcium 9.7  8.5 - 10.4 mg/dL    Bilirubin Total 0.4  0.2 - 1.3 mg/dL    Albumin 4.3  3.9 - 5.1 g/dL    Protein Total 7.8  6.8 - 8.8 g/dL    Alkaline Phosphatase 66  40 - 150 U/L    ALT 36  0 - 50 U/L    AST 28  0 - 45 U/L   CK TOTAL       Component Value Range    CK Total 66  30 - 225 U/L   CRP INFLAMMATION       Component Value Range    CRP Inflammation 10.4 (*) 0.0 - 8.0 mg/L   LIPASE       Component Value Range    Lipase 353 (*) 20 - 250 U/L   ERYTHROCYTE SEDIMENTATION RATE AUTO       Component Value Range    Sed Rate 26 (*) 0 - 20 mm/h   ROUTINE UA WITH MICROSCOPIC REFLEX TO CULTURE       Component Value Range    Color Urine Yellow      Appearance Urine Slightly Cloudy      Glucose Urine 30 (*) NEG mg/dL    Bilirubin Urine Negative  NEG    Ketones Urine 5 (*) NEG mg/dL    Specific Gravity Urine 1.026  1.003 - 1.035    Blood Urine Trace (*) NEG    pH Urine 5.0  5.0 - 7.0 pH    Protein Albumin Urine 10 (*) NEG mg/dL    Urobilinogen mg/dL Normal  0.0 - 2.0 mg/dL    Nitrite Urine Negative  NEG    Leukocyte Esterase Urine Negative  NEG    Source Midstream Urine      WBC Urine 1  0 - 2 /HPF    RBC Urine 4 (*) 0 - 2 /HPF    Squamous Epithelial /HPF Urine <1  0 - 1 /HPF    Mucous Urine Present (*) NEG /LPF    Hyaline Casts 3 (*) 0 - 2 /LPF    Calcium Oxalate Moderate (*) NEG /HPF   COMPLEMENT C3       Component Value Range    Complement C3 143  76 - 169 mg/dL   COMPLEMENT C4       Component Value Range    Complement C4 31  15 - 50 mg/dL   HEPATITIS C ANTIBODY       Component Value Range    Hepatitis C Antibody Negative  NEG   CARDIOLIPIN ANTIBODY IGG AND IGM       Component Value Range    Cardiolipin IgG Marline    0 - 15.0 GPL    Value: <15.0      Interpretation:  Negative    Cardiolipin IgM Marline    0 - 12.5 MPL    Value: <12.5      Interpretation:  Negative   LUPUS PANEL       Component Value Range    Lupus Result    NEG    Value: Negative      (Note)      COMMENTS:      The INR is normal.      APTT is normal.  1:2 Mix is  not indicated.      DRVVT Screen is normal.      Thrombin time is normal.      NEGATIVE TEST; A LUPUS ANTICOAGULANT WAS NOT DETECTED IN THIS      SPECIMEN WITHIN THE LIMITS OF THE TESTING REPERTOIRE.      If the clinical picture is strongly suggestive of an antiphospholipid      syndrome, recommend anticardiolipin and beta-2-glycoprotein (IgG and      IgM) antibody tests.      Leela Franks M.D.  [phone]      5/2/2013            INR =  0.93 N = 0.86-1.14  (No additional charge)      TT = 15.7 N = 13.0-19.0 sec  (No additional charge)            APTT'S:    Seconds      Reagent =  Stago LA      Normal  =  38      Patient  =  34      1:2 Mix  =  N/A      Reference =  31-43             DILUTE MADELINE VIPER VENOM TEST:      DRVVT Screen Ratio = 0.76 Normal = <1.21         IMMUNOGLOBULIN G SUBCLASSES       Component Value Range    IGG 1030  695 - 1620 mg/dL    IgG1 488  300 - 856 mg/dL    IgG2 325  158 - 761 mg/dL    IgG3 47  24 - 192 mg/dL    IgG4 18  11 - 86 mg/dL   SUNNY ANTIBODY PANEL       Component Value Range    Ribonucleic Protein IgG Antibody 0      Smith Antibody IgG 1      SSA (RO) Antibody IgG 4      SSB (LA) Antibody IgG 0      Scleroderma Antibody IgG 0     BETA 2 GLYCOPROTEIN ANTIBODIES IGG IGM       Component Value Range    Beta-2-Glycopro IgG 1      Beta-2-Glycopro IgM 5     CYCLIC CIT PEPT IGG       Component Value Range    Cyclic Cit Pept IgG/IgA    <20 UNITS    Value: <20      Interpretation:  Negative   DNA DOUBLE STRANDED ANTIBODIES       Component Value Range    DNA-ds    0 - 29 IU/mL    Value: <15      Interpretation:  Negative       CT CHEST PULMONARY EMBOLISM W CONTRAST 5/20/2015 4:57 PM  HISTORY: Pain. SOB. Elevated d-dimer.   TECHNIQUE: 65 mL Isovue 370. Axial images with coronal  reconstructions.  COMPARISON: None.  FINDINGS: Calcified granuloma with surrounding scarring in the  posterolateral left upper lobe. Triangular shaped opacity at the right  lung base in the lateral right  "middle lobe could represent some  scarring, atelectasis or infiltrate. There is also some scarring or  atelectasis in the posteromedial right lung base. Lungs otherwise  clear.  The pulmonary arteries are well opacified. No CT evidence for acute  pulmonary embolus. No aortic dissection.  Diffuse fatty infiltration of the liver.  IMPRESSION  IMPRESSION:   1. No pulmonary embolus identified.  2. Small focus of atelectasis, infiltrate or scarring in the lateral  right middle lobe.  3. Old granulomatous disease.  4. Otherwise negative.  SILVERIO VAZQUEZ MD    Copath Report      Patient Name: FAVIO MARTINEZ   MR#: [id number]   Specimen #: [id number]   Collected: 3/15/2016   Received: 3/15/2016   Reported: 3/17/2016 13:33   Ordering Phy(s): PARVEEN ENRIQUEZ     ORIGINAL REPORT FOLLOWS ADDENDUM  ADDENDUM     TO ORIGINAL REPORT   Status: Signed Out   Date Ordered:3/18/2016   Date Complete:3/18/2016   Date Reported:3/18/2016 12:06   Signed Out By: Marianne Godfrey MD     INTERPRETATION:   This addendum is done to incorporate the results of fungal (GMS) stains   done on the specimen.  Specimen is negative for fungal organisms.  The   original final diagnosis remains unchanged.     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  Negative for malignancy(Please   see microscopic description)     Electronically signed out by:     Marianne Godfrey MD     CLINICAL HISTORY:   right orbital mass     GROSS:   The specimen is received labeled \"right orbital biopsy\" and consists of   red-tan nodule measuring 1.5 x 0.9 x 0.6 cm with one smooth side and   opposite rough side consistent with periosteum.  The specimen is   bisected revealing homogenous pale tan fleshy cut surface.  Touch   preparations are prepared, air dried " and fixed, portion of specimen is   submitted in RPMI for possible lymphoma workup [facility],   ([facility], [place], [place] ).  The remainder is entirely submitted.   (Dictated by: Marianne Godfrey MD 3/15/2016 04:45 PM)     MICROSCOPIC:     Specimen consists of portion of lacrimal gland with acute and chronic   inflammation.  Focal area of microabscess formation associated with   small areas of necrosis are also present.  Inflammation is seen   extending to the periorbital adipose tissue forming panniculitis.   Specimen is negative for malignancy.  Samples sent for immunophenotyping   to [facility], ([facility], [place], [place] ) reveals no evidence   of monoclonality or aberrant antigen expression.  A GMS (fungal) stain   is pending and results will be reported as an addendum.     CPT Codes:   A: 84239-OV8, 97117-JVCUX, SOH, 98333-KMRNQ, 83575-YPXW     TESTING LAB LOCATION:   [address]  [address] [phone]     COLLECTION SITE:   [facility]   [facility]            Component      Latest Ref Rng 4/29/2016   Nucleated RBCs      0 /100 0   Absolute Neutrophil      1.6 - 8.3 10e9/L 8.9 (H)   Absolute Lymphocytes      0.8 - 5.3 10e9/L 3.2   Absolute Monocytes      0.0 - 1.3 10e9/L 0.8   Absolute Eosinophils      0.0 - 0.7 10e9/L 0.2   Absolute Basophils      0.0 - 0.2 10e9/L 0.1   Abs Immature Granulocytes      0 - 0.4 10e9/L 0.1   Absolute Nucleated RBC       0.0   IGG      695 - 1620 mg/dL 836   IgG1      300 - 856 mg/dL 280 (L)   IgG2      158 - 761 mg/dL 277   IgG3      24 - 192 mg/dL 35   IgG4      11 - 86 mg/dL 16   RNP Antibody IgG      0.0 - 0.9 AI <0.2 . . .   Smith SUNNY Antibody IgG      0.0 - 0.9 AI <0.2 . . .   SSA (Ro) (SUNNY) Antibody, IgG      0.0 - 0.9 AI <0.2 . . .   SSB (La) (SUNNY) Antibody, IgG      0.0 - 0.9 AI <0.2 . . .   Scleroderma Antibody Scl-70 SUNNY IgG      0.0 - 0.9 AI <0.2 . . .    Cholesterol      <200 mg/dL 154   Triglycerides      <150 mg/dL 220 (H)   HDL Cholesterol      >49 mg/dL 42 (L)   LDL Cholesterol Calculated      <100 mg/dL 67   Non HDL Cholesterol      <130 mg/dL 111   M Tuberculosis Result      NEG Negative   M Tuberculosis Antigen Value       0.00   Albumin      3.4 - 5.0 g/dL 4.3   ALT      0 - 50 U/L 30   AST      0 - 45 U/L 10   Complement C3      76 - 169 mg/dL 157   Complement C4      15 - 50 mg/dL 32   CRP Inflammation      0.0 - 8.0 mg/L <2.9   Sed Rate      0 - 20 mm/h 2   DNA-ds      <10 IU/mL 1   Cyclic Citrullinated Peptide Antibody, IgG      <7 U/mL 1   Rheumatoid Factor      <20 IU/mL <20   Proteinase 3 Antibody IgG      0.0 - 0.9 AI <0.2 . . .   Myeloperoxidase Antibody IgG      0.0 - 0.9 AI 2.5 (H)   Vitamin D Deficiency screening      20 - 75 ug/L 24   Hemoglobin A1C      4.3 - 6.0 % 7.9 (H)   ALLI by IFA IgG       1:40 (A) . . .     Component      Latest Ref Rng & Units 1/16/2021   WBC      4.0 - 11.0 10e9/L    RBC Count      3.8 - 5.2 10e12/L    Hemoglobin      11.7 - 15.7 g/dL    Hematocrit      35.0 - 47.0 %    MCV      78 - 100 fl    MCH      26.5 - 33.0 pg    MCHC      31.5 - 36.5 g/dL    RDW      10.0 - 15.0 %    Platelet Count      150 - 450 10e9/L    Diff Method          % Neutrophils      %    % Lymphocytes      %    % Monocytes      %    % Eosinophils      %    % Basophils      %    % Immature Granulocytes      %    Nucleated RBCs      0 /100    Absolute Neutrophil      1.6 - 8.3 10e9/L    Absolute Lymphocytes      0.8 - 5.3 10e9/L    Absolute Monocytes      0.0 - 1.3 10e9/L    Absolute Eosinophils      0.0 - 0.7 10e9/L    Absolute Basophils      0.0 - 0.2 10e9/L    Abs Immature Granulocytes      0 - 0.4 10e9/L    Absolute Nucleated RBC          IGG      610 - 1,616 mg/dL    IGA      84 - 499 mg/dL    IGM      35 - 242 mg/dL    Creatinine      0.52 - 1.04 mg/dL    GFR Estimate      >60 mL/min/1.73:m2    GFR Estimate If Black      >60 mL/min/1.73:m2     COVID-19 Virus PCR to [facility] - Source       Nasopharyngeal   COVID-19 Virus PCR to [facility] - Result       Not Detected   Neutrophil Cytoplasmic Antibody      <1:10 titer    Neutrophil Cytoplasmic Antibody Pattern          CD19 B Cells      6 - 27 %    Absolute CD19      107 - 698 cells/uL    Myeloperoxidase Antibody IgG      0.0 - 0.9 AI    Proteinase 3 Antibody IgG      0.0 - 0.9 AI    Vitamin D Deficiency screening      20 - 75 ug/L    Sed Rate      0 - 30 mm/h    CRP Inflammation      0.0 - 8.0 mg/L    Albumin      3.4 - 5.0 g/dL    ALT      0 - 50 U/L    AST      0 - 45 U/L      Component      Latest Ref Rng & Units 5/7/2021   WBC      4.0 - 11.0 10e9/L 8.9   RBC Count      3.8 - 5.2 10e12/L 4.89   Hemoglobin      11.7 - 15.7 g/dL 12.7   Hematocrit      35.0 - 47.0 % 39.7   MCV      78 - 100 fl 81   MCH      26.5 - 33.0 pg 26.0 (L)   MCHC      31.5 - 36.5 g/dL 32.0   RDW      10.0 - 15.0 % 13.7   Platelet Count      150 - 450 10e9/L 336   Diff Method       Automated Method   % Neutrophils      % 65.3   % Lymphocytes      % 20.7   % Monocytes      % 7.8   % Eosinophils      % 5.3   % Basophils      % 0.7   % Immature Granulocytes      % 0.2   Nucleated RBCs      0 /100 0   Absolute Neutrophil      1.6 - 8.3 10e9/L 5.8   Absolute Lymphocytes      0.8 - 5.3 10e9/L 1.8   Absolute Monocytes      0.0 - 1.3 10e9/L 0.7   Absolute Eosinophils      0.0 - 0.7 10e9/L 0.5   Absolute Basophils      0.0 - 0.2 10e9/L 0.1   Abs Immature Granulocytes      0 - 0.4 10e9/L 0.0   Absolute Nucleated RBC       0.0   IGG      610 - 1,616 mg/dL 649   IGA      84 - 499 mg/dL 199   IGM      35 - 242 mg/dL 36   Creatinine      0.52 - 1.04 mg/dL 0.96   GFR Estimate      >60 mL/min/1.73:m2 66   GFR Estimate If Black      >60 mL/min/1.73:m2 77   COVID-19 Virus PCR to [facility] - Source          COVID-19 Virus PCR to [facility] - Result          Neutrophil Cytoplasmic Antibody      <1:10 titer <1:10   Neutrophil Cytoplasmic Antibody Pattern        The ANCA IFA is <1:10.  No further testing will be performed.   CD19 B Cells      6 - 27 % <1 (L)   Absolute CD19      107 - 698 cells/uL <1 (L)   Myeloperoxidase Antibody IgG      0.0 - 0.9 AI 1.1 (H)   Proteinase 3 Antibody IgG      0.0 - 0.9 AI <0.2   Vitamin D Deficiency screening      20 - 75 ug/L 41   Sed Rate      0 - 30 mm/h 9   CRP Inflammation      0.0 - 8.0 mg/L <2.9   Albumin      3.4 - 5.0 g/dL 4.1   ALT      0 - 50 U/L 28   AST      0 - 45 U/L 18     Lab on 07/08/2022   Component Date Value Ref Range Status    Sodium 07/08/2022 143  133 - 144 mmol/L Final    Potassium 07/08/2022 3.9  3.4 - 5.3 mmol/L Final    Chloride 07/08/2022 108  94 - 109 mmol/L Final    Carbon Dioxide (CO2) 07/08/2022 25  20 - 32 mmol/L Final    Anion Gap 07/08/2022 10  3 - 14 mmol/L Final    Urea Nitrogen 07/08/2022 17  7 - 30 mg/dL Final    Creatinine 07/08/2022 1.01  0.52 - 1.04 mg/dL Final    Calcium 07/08/2022 9.3  8.5 - 10.1 mg/dL Final    Glucose 07/08/2022 103 (A) 70 - 99 mg/dL Final    GFR Estimate 07/08/2022 65  >60 mL/min/1.73m2 Final    Effective December 21, 2021 eGFRcr in adults is calculated using the 2021 CKD-EPI creatinine equation which includes age and gender (Ryan et al., NEJ, DOI: 10.1056/JKZNpd7492496)    WBC Count 07/08/2022 12.0 (A) 4.0 - 11.0 10e3/uL Final    RBC Count 07/08/2022 4.94  3.80 - 5.20 10e6/uL Final    Hemoglobin 07/08/2022 12.6  11.7 - 15.7 g/dL Final    Hematocrit 07/08/2022 39.6  35.0 - 47.0 % Final    MCV 07/08/2022 80  78 - 100 fL Final    MCH 07/08/2022 25.5 (A) 26.5 - 33.0 pg Final    MCHC 07/08/2022 31.8  31.5 - 36.5 g/dL Final    RDW 07/08/2022 13.6  10.0 - 15.0 % Final    Platelet Count 07/08/2022 350  150 - 450 10e3/uL Final    % Neutrophils 07/08/2022 66  % Final    % Lymphocytes 07/08/2022 22  % Final    % Monocytes 07/08/2022 9  % Final    % Eosinophils 07/08/2022 3  % Final    % Basophils 07/08/2022 0  % Final    % Immature Granulocytes 07/08/2022 0  % Final    NRBCs per 100  WBC 07/08/2022 0  <1 /100 Final    Absolute Neutrophils 07/08/2022 7.9  1.6 - 8.3 10e3/uL Final    Absolute Lymphocytes 07/08/2022 2.7  0.8 - 5.3 10e3/uL Final    Absolute Monocytes 07/08/2022 1.1  0.0 - 1.3 10e3/uL Final    Absolute Eosinophils 07/08/2022 0.3  0.0 - 0.7 10e3/uL Final    Absolute Basophils 07/08/2022 0.1  0.0 - 0.2 10e3/uL Final    Absolute Immature Granulocytes 07/08/2022 0.0  <=0.4 10e3/uL Final    Absolute NRBCs 07/08/2022 0.0  10e3/uL Final     Component      Latest Ref Rng & Units 8/19/2022   WBC      4.0 - 11.0 10e3/uL 12.6 (H)   RBC Count      3.80 - 5.20 10e6/uL 5.06   Hemoglobin      11.7 - 15.7 g/dL 12.8   Hematocrit      35.0 - 47.0 % 40.4   MCV      78 - 100 fL 80   MCH      26.5 - 33.0 pg 25.3 (L)   MCHC      31.5 - 36.5 g/dL 31.7   RDW      10.0 - 15.0 % 14.1   Platelet Count      150 - 450 10e3/uL 387   % Neutrophils      % 71   % Lymphocytes      % 20   % Monocytes      % 6   % Eosinophils      % 3   % Basophils      % 0   % Immature Granulocytes      % 0   NRBCs per 100 WBC      <1 /100 0   Absolute Neutrophils      1.6 - 8.3 10e3/uL 8.8 (H)   Absolute Lymphocytes      0.8 - 5.3 10e3/uL 2.5   Absolute Monocytes      0.0 - 1.3 10e3/uL 0.8   Absolute Eosinophils      0.0 - 0.7 10e3/uL 0.4   Absolute Basophils      0.0 - 0.2 10e3/uL 0.1   Absolute Immature Granulocytes      <=0.4 10e3/uL 0.1   Absolute NRBCs      10e3/uL 0.0   Color Urine      Colorless, Straw, Light Yellow, Yellow Straw   Appearance Urine      Clear Clear   Glucose Urine      Negative mg/dL 1000 (A)   Bilirubin Urine      Negative Negative   Ketones Urine      Negative mg/dL Negative   Specific Gravity Urine      1.003 - 1.035 1.020   Blood Urine      Negative Negative   pH Urine      5.0 - 7.0 5.0   Protein Albumin Urine      Negative mg/dL Negative   Urobilinogen mg/dL      Normal, 2.0 mg/dL Normal   Nitrite Urine      Negative Negative   Leukocyte Esterase Urine      Negative Negative   RBC Urine      <=2 /HPF 1    WBC Urine      <=5 /HPF 5   Squamous Epithelial /HPF Urine      <=1 /HPF <1   MPO Marline IgG Instrument Value      <3.5 U/mL 0.7   Myeloperoxidase Antibody IgG      Negative Negative   Proteinase 3 Marline IgG Instrument Value      <2.0 U/mL <1.0   Proteinase 3 Antibody IgG      Negative Negative   Protein Random Urine      g/L 0.11   Protein Total Urine g/gr Creatinine      0.00 - 0.20 g/g Cr 0.09   Creatinine Urine      mg/dL 128   IGG      610 - 1,616 mg/dL 641   IGA      84 - 499 mg/dL 204   IGM      35 - 242 mg/dL 32 (L)   Creatinine      0.52 - 1.04 mg/dL 1.24 (H)   GFR Estimate      >60 mL/min/1.73m2 51 (L)   CD19 B Cells      6 - 27 % <1 (L)   Absolute CD19      107 - 698 cells/uL <1 (L)   Neutrophil Cytoplasmic Antibody      <1:10 <1:10   Neutrophil Cytoplasmic Antibody Pattern       The ANCA IFA is <1:10.  No further testing will be performed.   AST      0 - 45 U/L 16   ALT      0 - 50 U/L 34   Albumin      3.4 - 5.0 g/dL 4.2   CRP Inflammation      0.0 - 8.0 mg/L 9.1 (H)   Sed Rate      0 - 30 mm/hr 15   Vitamin D Deficiency screening      20 - 75 ug/L 36     Component      Latest Ref Rng & Units 1/19/2023   WBC      4.0 - 11.0 10e3/uL 16.1 (H)   RBC Count      3.80 - 5.20 10e6/uL 5.39 (H)   Hemoglobin      11.7 - 15.7 g/dL 13.4   Hematocrit      35.0 - 47.0 % 41.9   MCV      78 - 100 fL 78   MCH      26.5 - 33.0 pg 24.9 (L)   MCHC      31.5 - 36.5 g/dL 32.0   RDW      10.0 - 15.0 % 15.4 (H)   Platelet Count      150 - 450 10e3/uL 383   % Neutrophils      % 79   % Lymphocytes      % 16   % Monocytes      % 4   % Eosinophils      % 1   % Basophils      % 0   % Immature Granulocytes      % 0   NRBCs per 100 WBC      <1 /100 0   Absolute Neutrophils      1.6 - 8.3 10e3/uL 12.6 (H)   Absolute Lymphocytes      0.8 - 5.3 10e3/uL 2.6   Absolute Monocytes      0.0 - 1.3 10e3/uL 0.7   Absolute Eosinophils      0.0 - 0.7 10e3/uL 0.2   Absolute Basophils      0.0 - 0.2 10e3/uL 0.1   Absolute Immature Granulocytes       <=0.4 10e3/uL 0.1   Absolute NRBCs      10e3/uL 0.0   Sodium      136 - 145 mmol/L 137   Potassium      3.4 - 5.3 mmol/L 4.0   Chloride      98 - 107 mmol/L 98   Carbon Dioxide (CO2)      22 - 29 mmol/L 25   Anion Gap      7 - 15 mmol/L 14   Urea Nitrogen      6.0 - 20.0 mg/dL 23.6 (H)   Creatinine      0.51 - 0.95 mg/dL 1.09 (H)   Calcium      8.6 - 10.0 mg/dL 10.3 (H)   Glucose      70 - 99 mg/dL 232 (H)   Alkaline Phosphatase      35 - 104 U/L 95   AST      10 - 35 U/L 23   ALT      10 - 35 U/L 26   Protein Total      6.4 - 8.3 g/dL 7.7   Albumin      3.5 - 5.2 g/dL 4.9   Bilirubin Total      <=1.2 mg/dL 0.3   GFR Estimate      >60 mL/min/1.73m2 59 (L)   Color Urine      Colorless, Straw, Light Yellow, Yellow Light Yellow   Appearance Urine      Clear Clear   Glucose Urine      Negative mg/dL >=1000 (A)   Bilirubin Urine      Negative Negative   Ketones Urine      Negative mg/dL 10 (A)   Specific Gravity Urine      1.003 - 1.035 1.033   Blood Urine      Negative Negative   pH Urine      5.0 - 7.0 6.0   Protein Albumin Urine      Negative mg/dL Negative   Urobilinogen mg/dL      Normal, 2.0 mg/dL Normal   Nitrite Urine      Negative Negative   Leukocyte Esterase Urine      Negative Negative   Iron      37 - 145 ug/dL 51   Iron Binding Capacity      240 - 430 ug/dL 362   Iron Sat Index      15 - 46 % 14 (L)   Parathyroid Hormone Intact      15 - 65 pg/mL 51   Calcium Ionized Whole Blood      4.4 - 5.2 mg/dL 4.9   Ferritin      11 - 328 ng/mL 70   TSH      0.30 - 4.20 uIU/mL 0.40   3 views cervical spine radiographs 2/10/2023 4:10 PM     History: neck pain; Neck pain     Comparison: MRI cervical spine 4/14/2015.     Findings:  AP, lateral, and odontoid views of the cervical spine were obtained.     Down to the level of C7 is well visualized on the lateral view(s). The  cervicothoracic junction is partially visualized.     There is no acute osseous abnormality. No prevertebral soft tissue  swelling. Normal cervical  lordosis is maintained.       Moderate loss of intervertebral disc height at C6-7. Mild loss of disc  height at C5-6. Additional multilevel degenerative changes including  endplate osteophytosis and uncinate hypertrophy. Dens is obscured by  the overlying maxilla on the odontoid view.     The visualized lung apices are clear.                                                                      Impression:  Multilevel cervical degenerative changes, greatest at C6-7.     I have personally reviewed the examination and initial interpretation  and I agree with the findings.     TASHI KELLY MD      Component      Latest Ref Rng & Units 2/10/2023   WBC      4.0 - 11.0 10e3/uL 11.9 (H)   RBC Count      3.80 - 5.20 10e6/uL 5.30 (H)   Hemoglobin      11.7 - 15.7 g/dL 13.2   Hematocrit      35.0 - 47.0 % 40.7   MCV      78 - 100 fL 77 (L)   MCH      26.5 - 33.0 pg 24.9 (L)   MCHC      31.5 - 36.5 g/dL 32.4   RDW      10.0 - 15.0 % 15.3 (H)   Platelet Count      150 - 450 10e3/uL 415   % Neutrophils      % 69   % Lymphocytes      % 23   % Monocytes      % 6   % Eosinophils      % 2   % Basophils      % 0   % Immature Granulocytes      % 0   NRBCs per 100 WBC      <1 /100 0   Absolute Neutrophils      1.6 - 8.3 10e3/uL 8.1   Absolute Lymphocytes      0.8 - 5.3 10e3/uL 2.7   Absolute Monocytes      0.0 - 1.3 10e3/uL 0.8   Absolute Eosinophils      0.0 - 0.7 10e3/uL 0.2   Absolute Basophils      0.0 - 0.2 10e3/uL 0.0   Absolute Immature Granulocytes      <=0.4 10e3/uL 0.0   Absolute NRBCs      10e3/uL 0.0   Sodium      136 - 145 mmol/L 138   Potassium      3.4 - 5.3 mmol/L 4.1   Chloride      98 - 107 mmol/L 99   Carbon Dioxide (CO2)      22 - 29 mmol/L 23   Anion Gap      7 - 15 mmol/L 16 (H)   Urea Nitrogen      6.0 - 20.0 mg/dL 24.6 (H)   Creatinine      0.51 - 0.95 mg/dL 1.07 (H)   Calcium      8.6 - 10.0 mg/dL 10.5 (H)   Glucose      70 - 99 mg/dL 205 (H)   Alkaline Phosphatase      35 - 104 U/L 86   AST      10 - 35 U/L  26   ALT      10 - 35 U/L 30   Protein Total      6.4 - 8.3 g/dL 7.6   Albumin      3.5 - 5.2 g/dL 4.8   Bilirubin Total      <=1.2 mg/dL 0.2   GFR Estimate      >60 mL/min/1.73m2 61   MPO Marline IgG Instrument Value      <3.5 U/mL 0.8   Myeloperoxidase Antibody IgG      Negative Negative   Proteinase 3 Marline IgG Instrument Value      <2.0 U/mL <1.0   Proteinase 3 Antibody IgG      Negative Negative   IGG      610 - 1,616 mg/dL 680   IGA      84 - 499 mg/dL 197   IGM      35 - 242 mg/dL 30 (L)   CD19 B Cells      6 - 27 % <1 (L)   Absolute CD19      107 - 698 cells/uL <1 (L)   Neutrophil Cytoplasmic Antibody      <1:10 <1:10   Neutrophil Cytoplasmic Antibody Pattern       The ANCA IFA is <1:10.  No further testing will be performed.   % Retic      0.5 - 2.0 % 1.4   Absolute Retic      0.025 - 0.095 10e6/uL 0.073   CRP Inflammation      <5.00 mg/L 6.67 (H)   Sed Rate      0 - 30 mm/hr 12   Lipase      13 - 60 U/L 26     ROS: A comprehensive ROS was done. Positives are per HPI.      HISTORY REVIEW:  Past Medical History:   Diagnosis Date    Abnormal glandular Papanicolaou smear of cervix 1992    Allergic rhinitis     Allergy, airborne subst    Arthritis     ASCVD (arteriosclerotic cardiovascular disease)     Chronic pain     Chronic pancreatitis (H)     idiopathic, Tx: PPI, antioxidants, pancreatic enzymes    Common migraine     Coronary artery disease     Costochondritis     Difficulty in walking(719.7)     Dyspnea on exertion     Ectasia, mammary duct     [facility], persistent nipple discharge    Elevated fasting glucose     Gastro-oesophageal reflux disease     Granulomatosis with polyangiitis (H)     Hernia     History of angina     Hyperlipidemia LDL goal < 100     Hypertension goal BP (blood pressure) < 140/90     Essential hypertension    Iron deficiency anemia     Ischemic cardiomyopathy     Menorrhagia     Migraine headaches     Mild persistent asthma     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 Matias, neg w/u, Rx'd w/prednisone    Ventral hernia, unspecified, without mention of obstruction or gangrene 2012     Past Surgical History:   Procedure Laterality Date    C/SECTION, LOW TRANSVERSE  1996        CARDIAC SURGERY      cardiac stent- recent in 16; 4 other stents    COLONOSCOPY N/A 2023    Procedure: COLONOSCOPY, WITH POLYPECTOMY;  Surgeon: Percy Gross MD;  Location: [facility] OR    DILATION AND CURETTAGE N/A 2016    Procedure: DILATION AND CURETTAGE;  Surgeon: Nahed Butler MD;  [facility]    ENDOMETRIAL SAMPLING (BIOPSY) N/A 2023    Procedure: ENDOMETRIAL BIOPSY;  Surgeon: Nahed Butler MD;  [facility]    ESOPHAGOSCOPY, GASTROSCOPY, DUODENOSCOPY (EGD), COMBINED N/A 2022    Procedure: ESOPHAGOGASTRODUODENOSCOPY, WITH BIOPSY;  Surgeon: Enzo Sesay MD;  [facility]: [facility]    ESOPHAGOSCOPY, GASTROSCOPY, DUODENOSCOPY (EGD), COMBINED N/A 2023    Procedure: ESOPHAGOGASTRODUODENOSCOPY, WITH BIOPSY;  Surgeon: Percy Gross MD;  Location: [facility] OR    EXAM UNDER ANESTHESIA PELVIC N/A 2023    Procedure: EXAM UNDER ANESTHESIA;  Surgeon: Nahed Butler MD;  [facility]    HC UGI ENDOSCOPY W EUS  2008    Dr. Pastrana, possible chronic pancreatitis, fatty liver     HERNIA REPAIR  2012    [place] [facility] [place]    INSERT INTRAUTERINE DEVICE N/A 2016    Procedure: INSERT INTRAUTERINE DEVICE;  Surgeon: Nahed Butler MD;  [facility]    INT UTERINE FIBRIOD EMBOLIZATION  10/29/2014    LAPAROSCOPIC CHOLECYSTECTOMY  2008    Dr. Arnol GRUBBS TX, CERVICAL  1992    s/p HUMERA, done at [facility] in [place].    ORBITOTOMY Right 03/15/2016    Procedure: ORBITOTOMY;  Surgeon: Myron Cyr MD;  [facility]    ORBITOTOMY Right 2017    Procedure: ORBITOTOMY;  RIGHT ORBITOTOMY AND BIOPSY;  Surgeon: Charis Holbrook MD;  Location: [facility]    REMOVE INTRAUTERINE DEVICE N/A 2023    Procedure: Remove intrauterine device,;  Surgeon: Nahed Butler MD;  [facility]    REPAIR PTOSIS Right 2017    Procedure: REPAIR PTOSIS;  RIGHT UPPER LID PTOSIS REPAIR;  Surgeon: Myron Cyr MD;  Location: [facility]    UPPER GI ENDOSCOPY  10/21/2008    mild gastritis, Dr. Rocky CALDERA ECHO HEART XTHORACIC,COMPLETE, W/O DOPPLER  2004    [facility] WNL, EF 60%     Family History   Problem Relation Age of Onset    Hypertension Mother     Arthritis Mother     Heart Disease Mother         long qt syndrome    Thyroid Disease Mother     C.A.D. Mother     Heart Disease Father 50        heart attack    Cerebrovascular Disease Father     Diabetes Father     Hypertension Father     Depression Father     C.A.D. Father     Neurologic Disorder Sister         migraines    Neurologic Disorder Sister         migraines    Heart Disease Brother 15        MI at 15, 16.     Arthritis Brother         he passed away, had severe arthritis at age 11    C.A.D. Brother     Anesthesia Reaction Son         PONV    Respiratory Son         asthma    Hypertension Maternal Aunt     Diabetes Maternal Uncle     Hypertension Maternal Uncle     Arthritis Maternal Uncle     Eye Disorder Maternal Uncle         cataracts    Cerebrovascular Disease Maternal Uncle      Family History Negative Other         neg for RA, SLE    Unknown/Adopted No family hx of         unknown neurological issues in her family, mother was adopted    Skin Cancer No family hx of         No known family hx of skin cancer    Deep Vein Thrombosis (DVT) No family hx of      Social History     Socioeconomic History    Marital status: Single     Spouse name: Not on file    Number of children: 1    Years of education: Not on file    Highest education level: Not on file   Occupational History     Employer: NONE    Tobacco Use    Smoking status: Some Days     Current packs/day: 0.00     Average packs/day: 0.2 packs/day for 1 year (0.2 ttl pk-yrs)     Types: Cigarettes     Start date: 2015     Last attempt to quit: 2016     Years since quittin.9    Smokeless tobacco: Never   Vaping Use    Vaping status: Every Day    Substances: Nicotine   Substance and Sexual Activity    Alcohol use: No     Alcohol/week: 0.0 standard drinks of alcohol    Drug use: No    Sexual activity: Not Currently   Other Topics Concern    Parent/sibling w/ CABG, MI or angioplasty before 65F 55M? Yes   Social History Narrative    Balanced Diet - sometimes    Osteoporosis Prevention Measures - Dairy servings per day: 2 servings weekly    Regular Exercise -  Yes Describe walking 4 times a week    Dental Exam - NO    Seatbelts used - Yes    Self Breast Exam - Yes    Abuse: Current or Past (Physical, Sexual or Emotional)- No    Do you have any concerns about STD's -  No    Do you feel safe in your environment - No    Guns stored in the home - No    Sunscreen used - Yes    Lipids -  YES - Date:     Glucose -  YES - Date:     Eye Exam - YES - Date: one year ago    Colon Cancer Screening - No    Hemoccults - NO    Pap Test -  YES - Date: , remote history of LEEP    Mammography - YES - Date: last spring, would like to discuss, needs a referral to [facility]    DEXA - NO    Immunizations reviewed and up  to date - Yes, last td given in 1997 or 1998     Social Drivers of Health     Financial Resource Strain: Low Risk  (2/9/2024)    Financial Resource Strain     Within the past 12 months, have you or your family members you live with been unable to get utilities (heat, electricity) when it was really needed?: No   Food Insecurity: High Risk (2/9/2024)    Food Insecurity     Within the past 12 months, did you worry that your food would run out before you got money to buy more?: Yes     Within the past 12 months, did the food you bought just not last and you didn t have money to get more?: No   Transportation Needs: Low Risk  (2/9/2024)    Transportation Needs     Within the past 12 months, has lack of transportation kept you from medical appointments, getting your medicines, non-medical meetings or appointments, work, or from getting things that you need?: No   Physical Activity: Not on file   Stress: Not on file   Social Connections: Not on file   Interpersonal Safety: Low Risk  (2/9/2024)    Interpersonal Safety     Do you feel physically and emotionally safe where you currently live?: Yes     Within the past 12 months, have you been hit, slapped, kicked or otherwise physically hurt by someone?: No     Within the past 12 months, have you been humiliated or emotionally abused in other ways by your partner or ex-partner?: No   Housing Stability: Low Risk  (2/9/2024)    Housing Stability     Do you have housing? : Yes     Are you worried about losing your housing?: No     Patient Active Problem List   Diagnosis    Seasonal affective disorder    Allergic rhinitis    PCOS (polycystic ovarian syndrome)    Moderate persistent asthma    Chronic pancreatitis (H)    Hypertension goal BP (blood pressure) < 140/90    Common migraine    NSTEMI (non-ST elevated myocardial infarction) (H)    Hyperlipidemia LDL goal <70    ASCVD (arteriosclerotic cardiovascular disease)    GERD (gastroesophageal reflux disease)    Ischemic  cardiomyopathy    Hypertensive heart disease    Uterine leiomyoma    Iron deficiency anemia    Costochondritis    Vitamin D deficiency    Breast cancer screening    Spinal stenosis in cervical region    Fibromyalgia    Seronegative rheumatoid arthritis (H)    Type 2 diabetes, HbA1C goal < 8% (H)    Type 2 diabetes mellitus with other specified complication (H)    Hyperlipidemia associated with type 2 diabetes mellitus (H)    Hypertension associated with diabetes (H)    Overweight with body mass index (BMI) 25.0-29.9    Tobacco use disorder    Telogen effluvium    CAD S/P percutaneous coronary angioplasty    Status post coronary angiogram    ANCA-associated vasculitis (H)    Wegener's vasculitis    Type 1 diabetes mellitus with complications (H)    Chest discomfort    Urinary frequency    Abdominal pain, epigastric    Hypokalemia    Leukocytosis, unspecified type    Acute pancreatitis, unspecified complication status, unspecified pancreatitis type       Pregnancy Hx: She is . All miscarriages were in first trimester. H/o OC use in the past. 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    PMHx, FHx, SHx were reviewed, unchanged.    Outpatient Encounter Medications as of 2025   Medication Sig Dispense Refill    acetaminophen (TYLENOL) 325 MG tablet Take 1-2 tablets (325-650 mg) by mouth every 6 hours as needed for pain (headache) 250 tablet 4    ADVAIR DISKUS 250-50 MCG/ACT inhaler Inhale 1 puff into the lungs 2 times daily      albuterol (2.5 MG/3ML) 0.083% neb solution INL 1 VIAL VIA NEBULIZATION Q 4 TO 6 HOURS PRN  1    albuterol (PROAIR HFA, PROVENTIL HFA, VENTOLIN HFA) 108 (90 BASE) MCG/ACT inhaler Inhale 2 puffs into the lungs every 6 hours as needed for shortness of breath / dyspnea or wheezing 3 Inhaler 1    BANOPHEN 25 MG tablet Take 25 mg by mouth At Bedtime      blood glucose (NO BRAND SPECIFIED) lancets standard Use to test blood sugar 1-4 times daily or as directed. 400 each 3     blood glucose (NO BRAND SPECIFIED) test strip Use to test blood sugar fasting each am and predinner daily. 250 strip 3    blood glucose monitoring (NO BRAND SPECIFIED) meter device kit Use to test blood sugar 2 times daily or as directed. 1 kit 0    Blood Pressure Monitor KIT 1 each daily Monitor home blood pressure as instructed by physician.  Dispense [place] blood pressure kit. 1 kit 0    calcium carbonate (TUMS) 500 MG chewable tablet Take 3-4 tablets (1,500-2,000 mg) by mouth daily as needed 90 tablet 3    clopidogrel (PLAVIX) 75 MG tablet Take 1 tablet (75 mg) by mouth daily. . 30 tablet 3    clotrimazole (MYCELEX) 10 MG lozenge Place 1 lozenge (10 mg) inside cheek 5 times daily 50 lozenge 1    cyanocobalamin (VITAMIN B-12) 1000 MCG tablet Take 1 tablet by mouth daily at 2 pm      cyclobenzaprine (FLEXERIL) 10 MG tablet Take 1 tablet (10 mg) by mouth 2 times daily as needed for muscle spasms 60 tablet 3    cycloSPORINE (RESTASIS) 0.05 % ophthalmic emulsion 1 month supply 11 refills 1 each 11    dicyclomine (BENTYL) 20 MG tablet TAKE 1 TABLET(20 MG) BY MOUTH FOUR TIMES DAILY AS NEEDED. 60 tablet 4    empagliflozin (JARDIANCE) 25 MG TABS tablet Take 1 tablet (25 mg) by mouth daily 90 tablet 2    EPIPEN 2-RIKY 0.3 MG/0.3ML injection INJECT 0.3 MG INTO THE MUSCLE PRF ANAPHYALAXIS  0    esomeprazole (NEXIUM) 40 MG DR capsule Take 1 capsule (40 mg) by mouth 2 times daily Take 30-60 minutes before eating. 180 capsule 0    estradiol (VAGIFEM) 10 MCG TABS vaginal tablet Place 1 tablet (10 mcg) vaginally twice a week 24 tablet 3    fluticasone (FLONASE) 50 MCG/ACT nasal spray Spray 1 spray in nostril as needed      folic acid (FOLVITE) 1 MG tablet TAKE 1 TABLET BY MOUTH EVERY DAY 90 tablet 0    insulin glargine (LANTUS PEN) 100 UNIT/ML pen Inject 56 Units Subcutaneous every morning Or as directed. 75 mL 1    insulin pen needle (BD PEN NEEDLE MARCK 2ND GEN) 32G X 4 MM miscellaneous USE ONE PEN NEEDLE DAILY OR AS DIRECTED 100  each 2    lisinopril-hydrochlorothiazide (ZESTORETIC) 20-25 MG tablet Take 1 tablet by mouth daily. 30 tablet 3    magnesium 250 MG tablet TAKE 1 TABLET(250 MG) BY MOUTH TWICE DAILY 60 tablet 3    Magnesium Oxide 250 MG tablet TAKE 1 TABLET(250 MG) BY MOUTH TWICE DAILY 60 tablet 3    metFORMIN (GLUCOPHAGE XR) 500 MG 24 hr tablet Take 4 tablets (2,000 mg) by mouth daily (with dinner) 360 tablet 3    metoprolol succinate ER (TOPROL XL) 100 MG 24 hr tablet Take 1 tablet (100 mg) by mouth daily. 30 tablet 3    Multiple Vitamin (DAILY-GERALD MULTIVITAMIN) TABS TAKE 1 TABLET BY MOUTH EVERY  tablet 3    olopatadine (PATANOL) 0.1 % ophthalmic solution Place 1 drop into both eyes as needed      ondansetron (ZOFRAN ODT) 8 MG ODT tab Take 1 tablet (8 mg) by mouth every 8 hours as needed for nausea. 20 tablet 1    pravastatin (PRAVACHOL) 40 MG tablet TAKE 1 TABLET BY MOUTH EVERY DAY 30 tablet 3    pregabalin (LYRICA) 25 MG capsule Take 2 capsules (50 mg) by mouth daily. 180 capsule 1    sennosides (SENOKOT) 8.6 MG tablet 1-2 tabs a day as needed for constipation 60 tablet 1    spironolactone (ALDACTONE) 25 MG tablet TAKE 1 TABLET (25 MG) BY MOUTH DAILY. MAY ALSO TAKE 0.5 TABLETS (12.5 MG) DAILY AS NEEDED (FOR WEIGHT GAIN). 45 tablet 3    sucralfate (CARAFATE) 1 GM tablet Take 1 tablet (1 g) by mouth 4 times daily 120 tablet 3    traMADol (ULTRAM) 50 MG tablet TAKE 1 TABLET(50 MG) BY MOUTH EVERY 8 HOURS AS NEEDED FOR SEVERE PAIN 60 tablet 3     No facility-administered encounter medications on file as of 1/23/2025.       Allergies   Allergen Reactions    Amoxicillin-Pot Clavulanate      Unknown possible hives and edema    Amoxicillin-Pot Clavulanate     Artificial Sweetner (Informational Only)  Other (See Comments)     Increased headache    Azithromycin     Clavulanic Acid     Diatrizoate Other (See Comments)     Pt wants this listed because she is allergic to shellfish     Imitrex [Triptans]      Severe face/neck/chest  "tightness and flushing side effects     Penicillins Hives     Unknown     Pork Allergy      Stomach pain, cramping, diarrhea, itching, nausea and headaches    Shellfish Allergy Hives and Swelling    Shellfish-Derived Products     Sulfa Antibiotics Hives and Swelling    Sulfatolamide     Zithromax [Azithromycin Dihydrate] Swelling     Unknown        Ph.E:    Ht 1.588 m (5' 2.5\")   Wt 72.6 kg (160 lb)   LMP  (LMP Unknown)   BMI 28.80 kg/m      GA: NAD, pleasant  HEENT: nl conj, sclera, EOMI. no campos-orbital edema under R eye  MSK: no synovitis  Skin: no rash, thin hair  Neuro: non focal  Psych: nl affect    Assessment/Plan:    1-Seropositive non-erosive RA (arthritis, AM stiffness, high CRP, RF 26 but re-check neg 3/2015 on HCQ) Dx 5/2013, FMS, new pleuritic CP 3/20-15-5/2015 resolved on steroids. She is on  mg bid since 5/2014. She tolerates it well and it's helping some but not enough to control all her pain. Continued having flare ups of joint pain, could be GPA related. Some pain is due to FMS.My impression is that her arthralgias are a combination of RA/ IA sec GPA, fibromyalgia and chronic pain.     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 Prednisone was not a good option for her given weight gain, diabetes. She tried and did not tolerate MTX, AZA.    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 Repeat MPO was positive in 6/2017.    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 Shah and Dr. Valdez at [facility]. 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     She is on rituximab since 12/12/2018 with great response, minor allergic reaction which could be managed by pre-meds and extra steroid dose. Developed high BG and vaginal yeast infections after solumedrol premed. Treated candida with fluconazole. " Will decrease solumerdol dose to IV 80 mg prior to receiving rituximab. 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 However, feels Rituximab effects wear off around 4 months. According to ACR guidelines can give every 4-6 months. Pt elected to received this upcoming September which will be approximately 5 months from last dose. Repeat Orbit CT in September 2021 demonstrated improvement.     Last visit in April 2022-- Recommended evusheld given b cell depletion tx as rituximab blocks the response to covid vaccine, she is concerned about side effects. I advised her to discuss with [facility] pharmacist.      1/25/2023: Janine has been ill since Dx of COVID on 12/17/2022. Her most recent labs were reviewed, stable vasculitis labs in 8/2022 with CD19<1, neg vasculitis markers. In 1/2023, no anemia and nl thyroid function test. I ordered vit D as add on. 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 She does need to follow up on abnormal thyroid US and mammogram as well. Our plan for ANCA vasculitis was to give rituximab 1 gram [id number] as benefit did not last 6 months with last rituximab on 9/7/2022, but today we both agreed to give next dose in March after 6 months to let her body heal from COVID. I will see her in person, in early march to determine if it is ok to give another dose of rituxiamb. 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 Also ordered shower chair, commode per her request given significant fatigue, body pain.    3/9/2023: S/p last rituximab 1 gram on 9/7/2022. 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 Stable labs and eye inflammation.    2-Fat pads. She was seen by endocrinology and cushing  was ruled out in 4/2014. Was advised to do f/u for enlarged thyroid/thyroid nodules.  3-Hair loss. Continue with dermatology  4-Chronic pain. F/U with pain clinic  5-Vit D deficiency. Was replaced with vit D 50, 000 units po qwk x 12 wk then 2000 units every day  6-?HCQ toxicity on OCT 7/2021, now off HCQ, could explain increased arthralgia  7- Chronic Headaches. Symptoms worsen after taking zofran. Has received compazine in hospital in the past without adverse effect. Will have patient trial Compazine       3/2023 plan:    Spine referral for abnormal C spine (DJD) in 2/2023.    Rituximab 1 gram one dose only this month    Labs in May 2023    Follow up eye exam 8/2023    Return in person in about 5-6 months       9/6/2023: last rituximab 1 gram one dose for ANCA vasculitis on 3/28/2023, increased arthralgia, will try rituximab at full dose, zanaflex and consider resuming HCQ as last eye exam did rule out HCQ toxicity (HCQ was stopped with concern for possible HCQ toxicity).    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 Will schedule rituximab in Feb, pain mx was discussed. Labs are stable.    S/p rituximab 1 gram on 9/19/2023, 10/3/2023.    Plan:      Rituximab 1 gram every 2 weeks x 2 infusion in Feb for ANCA vasculitis    Labs with next rituximab infusion    X-rays today    Acupuncture referral    Return in person in about 4 months      6/5/2024: more joint pain, rituximab does not last 6 months, will move up appointment. Will check orbit CT. Stable labs. I asked her PCP to help with pain mx.    Labs today    Move  up rituximab 1 gram once from Aug to June 2024, our pharmacist will contact you    CT orbit with no contrast    Return in about 3-4 months in person      10/11/2024:    GPA is stable on rituximab q6 mo, suspect knee pain to be from OA, pain sx to be from neuropathy. Discussed mx, going up on lyrica,s he is concerned about SE like weight gain, but willing to try increasing a little bit. Recommend follow up with PCP for pain mx, acupuncture and LDN could be good options.    Plan:    Rituximab 1 gram every 2 weeks in March 2025       Refer to ortho, knee specialist      Go up on lyrica to 50 mg a day      Return in about 4-5 months (in person)        Today 1/23/2025:    Stable GPA on rituximab, pain seems to be neuropathy related, failed lyrica. She wishes to do follow up with PCP for pain mx.    Plan:    Rituximab 1 gram q2wk x 2 in March    Labs with rituximab    Follow up with Dr. Solano for pain management, consider LDN, TENS unit is a good idea    Ok to come off lyrica, bt taper it off slowly    Return in person in about 4-5 months    TT 30 min was spent on date of the encounter doing chart review, history and exam, documentation and further activities as noted above. Any prior notes, outside records, laboratory results, and imaging studies were reviewed if relevant.      The longitudinal plan of care for the diagnosis(es)/condition(s) as documented were addressed during this visit. Due to the added complexity in care, I will continue to support Janine in the subsequent management and with ongoing continuity of care.      Orders Placed This Encounter   Procedures    AST    ALT    Myeloperoxidase and Proteinase 3 Panel    Albumin level    Creatinine    CRP inflammation    UA with Microscopic reflex to Culture    Protein  random urine    Creatinine random urine    Erythrocyte sedimentation rate auto    CD19 B Cell Count    ANCA IgG by IFA with Reflex to Titer    Immunoglobulins A G and M    CBC with Platelets &  Differential        Parastoo Pratibha CARRINGTON

## 2025-01-23 NOTE — PATIENT INSTRUCTIONS
Rituximab in March    Labs with rituximab    Follow up with Dr. Solano for pain management, consider LDN, TENS unit is a good idea    Ok to come off lyrica, bt taper it off slowly    Return in person in about 4-5 months

## 2025-01-23 NOTE — TELEPHONE ENCOUNTER
Rituximab infusion orders pended for provider to review. Per previous infusions, she received 100mg of solucortef mid infusion, this was added per the previous infusions.       Hailey Oneil RN  [facility]

## 2025-01-23 NOTE — TELEPHONE ENCOUNTER
----- Message from Majo Mckinnon sent at 1/23/2025  3:37 PM CST -----  Regarding: rituximab orders  Would you please send me orders for rituximab?    1 gram q2 weeks x 2    For March 2025    For ANCA vasculitis/GPA    Thank you

## 2025-01-24 RX ORDER — MEPERIDINE HYDROCHLORIDE 25 MG/ML
25 INJECTION INTRAMUSCULAR; INTRAVENOUS; SUBCUTANEOUS
OUTPATIENT
Start: 2025-01-24

## 2025-01-24 RX ORDER — HEPARIN SODIUM,PORCINE 10 UNIT/ML
5-20 VIAL (ML) INTRAVENOUS DAILY PRN
OUTPATIENT
Start: 2025-01-24

## 2025-01-24 RX ORDER — DIPHENHYDRAMINE HYDROCHLORIDE 50 MG/ML
50 INJECTION INTRAMUSCULAR; INTRAVENOUS
Start: 2025-01-24

## 2025-01-24 RX ORDER — HYDROCORTISONE SODIUM SUCCINATE 100 MG/2ML
100 INJECTION INTRAMUSCULAR; INTRAVENOUS ONCE
Start: 2025-01-24 | End: 2025-01-24

## 2025-01-24 RX ORDER — DIPHENHYDRAMINE HYDROCHLORIDE 50 MG/ML
25 INJECTION INTRAMUSCULAR; INTRAVENOUS
Start: 2025-01-24

## 2025-01-24 RX ORDER — HEPARIN SODIUM (PORCINE) LOCK FLUSH IV SOLN 100 UNIT/ML 100 UNIT/ML
5 SOLUTION INTRAVENOUS
OUTPATIENT
Start: 2025-01-24

## 2025-01-24 RX ORDER — ALBUTEROL SULFATE 0.83 MG/ML
2.5 SOLUTION RESPIRATORY (INHALATION)
OUTPATIENT
Start: 2025-01-24

## 2025-01-24 RX ORDER — ALBUTEROL SULFATE 90 UG/1
1-2 INHALANT RESPIRATORY (INHALATION)
Start: 2025-01-24

## 2025-01-24 RX ORDER — ACETAMINOPHEN 325 MG/1
650 TABLET ORAL ONCE
Start: 2025-01-24

## 2025-01-24 RX ORDER — EPINEPHRINE 1 MG/ML
0.3 INJECTION, SOLUTION, CONCENTRATE INTRAVENOUS EVERY 5 MIN PRN
OUTPATIENT
Start: 2025-01-24

## 2025-01-24 RX ORDER — METHYLPREDNISOLONE SODIUM SUCCINATE 40 MG/ML
40 INJECTION INTRAMUSCULAR; INTRAVENOUS
Start: 2025-01-24

## 2025-01-24 RX ORDER — METHYLPREDNISOLONE SODIUM SUCCINATE 125 MG/2ML
80 INJECTION INTRAMUSCULAR; INTRAVENOUS ONCE
OUTPATIENT
Start: 2025-01-24

## 2025-01-27 ENCOUNTER — TELEPHONE (OUTPATIENT)
Dept: RHEUMATOLOGY | Facility: CLINIC | Age: 60
End: 2025-01-27
Payer: COMMERCIAL

## 2025-01-27 NOTE — TELEPHONE ENCOUNTER
Left Voicemail (1st Attempt) for the patient to call back and schedule the following:    Appointment type: Return Rheumatology  Provider: Dr. Mckinnon  Return date: May 2025  Specialty phone number: [phone]  Additional appointment(s) needed: NA  Additonal Notes: NA

## 2025-02-03 ENCOUNTER — TELEPHONE (OUTPATIENT)
Dept: FAMILY MEDICINE | Facility: CLINIC | Age: 60
End: 2025-02-03
Payer: COMMERCIAL

## 2025-02-03 DIAGNOSIS — E11.59 TYPE 2 DIABETES MELLITUS WITH OTHER CIRCULATORY COMPLICATION, WITH LONG-TERM CURRENT USE OF INSULIN (H): ICD-10-CM

## 2025-02-03 DIAGNOSIS — Z79.4 TYPE 2 DIABETES MELLITUS WITH OTHER CIRCULATORY COMPLICATION, WITH LONG-TERM CURRENT USE OF INSULIN (H): ICD-10-CM

## 2025-02-03 DIAGNOSIS — E11.9 TYPE 2 DIABETES, HBA1C GOAL < 7% (H): ICD-10-CM

## 2025-02-03 NOTE — TELEPHONE ENCOUNTER
[facility] Call Center    Phone Message    May a detailed message be left on voicemail: yes     Reason for Call: Medication Refill Request    Has the patient contacted the pharmacy for the refill? Yes   Name of medication being requested: blood glucose monitor, poker pin, lancets for needle, test strips  Provider who prescribed the medication: Dr. Solano  Pharmacy:   [facility] [id number] [place], [place] - [address] [facility]     Date medication is needed: ASAP  Patient dropped her monitor in the water and needs a new one and all the supplies that goes with it. She's been out since Friday, so please fill ASAP. Please call patient when prescription has been sent.

## 2025-02-04 NOTE — TELEPHONE ENCOUNTER
Pt states the clinic ordered the incorrect model. Pt states she needs the accucheck guide, not the accucheck guide me. Please advise.

## 2025-02-18 ENCOUNTER — TELEPHONE (OUTPATIENT)
Dept: RHEUMATOLOGY | Facility: CLINIC | Age: 60
End: 2025-02-18
Payer: COMMERCIAL

## 2025-02-18 NOTE — TELEPHONE ENCOUNTER
Voicemail does not identify patient, left generic message with call back number.  Advised to follow up with infusion center for scheduling.      Hailey Oneil RN  [facility]

## 2025-02-18 NOTE — TELEPHONE ENCOUNTER
----- Message from Majo Mckinnon sent at 2/18/2025  2:06 PM CST -----  Regarding: RE: riTUXimab  That is strange, it never happened before  ----- Message -----  From: Adriana Mckeon  Sent: 2/18/2025  12:05 PM CST  To: Majo Mckinnon MD  Subject: riTUXimab                                        Hello,      We have attempted to reach out to this patient to schedule their riTUXimab Appt at least 3 times with no success. Please advise on a plan for this pt. Please discontinue the therapy plan if the medication will not be pursued.    Thank you,  Adriana Mckeon  [facility], [place]  [phone]

## 2025-02-20 NOTE — TELEPHONE ENCOUNTER
Spoke with patient, scheduled follow up in July (next available) and added to waitlist. Patient confirmed she is scheduled for rituximab now.     Hailey Oneil RN  [facility]

## 2025-03-02 DIAGNOSIS — E11.9 TYPE 2 DIABETES, HBA1C GOAL < 8% (H): ICD-10-CM

## 2025-03-07 DIAGNOSIS — N95.2 ATROPHIC VAGINITIS: ICD-10-CM

## 2025-03-11 ENCOUNTER — TELEPHONE (OUTPATIENT)
Dept: CARDIOLOGY | Facility: CLINIC | Age: 60
End: 2025-03-11
Payer: COMMERCIAL

## 2025-03-11 DIAGNOSIS — I25.10 CORONARY ARTERY DISEASE INVOLVING NATIVE HEART WITHOUT ANGINA PECTORIS, UNSPECIFIED VESSEL OR LESION TYPE: Primary | ICD-10-CM

## 2025-03-11 NOTE — TELEPHONE ENCOUNTER
Left Voicemail (1st Attempt) for the patient to call back and schedule the following:    Appointment type: fasting labs   Provider: arslan   Return date: 03/20/25  Specialty phone number: [phone] opt 1   Additional appointment(s) needed: echo   Additonal Notes: n/a

## 2025-03-13 RX ORDER — ESTRADIOL 10 UG/1
10 TABLET VAGINAL
Qty: 24 TABLET | Refills: 1 | Status: SHIPPED | OUTPATIENT
Start: 2025-03-13

## 2025-03-13 NOTE — TELEPHONE ENCOUNTER
Left Voicemail (2nd Attempt) for the patient to call back and schedule the following:    Appointment type: fasting labs   Provider: arslan   Return date: 03/20/25  Specialty phone number: [phone] opt 1   Additional appointment(s) needed: echo   Additonal Notes: NEEDS FASTING LABS AND ECHO PRIOR TO APPT ON 3/20

## 2025-03-20 ENCOUNTER — ANCILLARY PROCEDURE (OUTPATIENT)
Dept: MAMMOGRAPHY | Facility: CLINIC | Age: 60
End: 2025-03-20
Attending: FAMILY MEDICINE
Payer: COMMERCIAL

## 2025-03-20 ENCOUNTER — OFFICE VISIT (OUTPATIENT)
Dept: CARDIOLOGY | Facility: CLINIC | Age: 60
End: 2025-03-20
Attending: INTERNAL MEDICINE
Payer: COMMERCIAL

## 2025-03-20 ENCOUNTER — HOSPITAL ENCOUNTER (OUTPATIENT)
Dept: CARDIOLOGY | Facility: CLINIC | Age: 60
Discharge: HOME | End: 2025-03-20
Attending: INTERNAL MEDICINE
Payer: COMMERCIAL

## 2025-03-20 ENCOUNTER — LAB (OUTPATIENT)
Dept: LAB | Facility: CLINIC | Age: 60
End: 2025-03-20
Payer: COMMERCIAL

## 2025-03-20 VITALS
HEART RATE: 70 BPM | SYSTOLIC BLOOD PRESSURE: 142 MMHG | OXYGEN SATURATION: 98 % | BODY MASS INDEX: 28.28 KG/M2 | DIASTOLIC BLOOD PRESSURE: 86 MMHG | WEIGHT: 157.1 LBS

## 2025-03-20 DIAGNOSIS — Z12.31 VISIT FOR SCREENING MAMMOGRAM: ICD-10-CM

## 2025-03-20 DIAGNOSIS — I25.10 CORONARY ARTERY DISEASE INVOLVING NATIVE HEART WITHOUT ANGINA PECTORIS, UNSPECIFIED VESSEL OR LESION TYPE: Primary | ICD-10-CM

## 2025-03-20 DIAGNOSIS — I25.10 CORONARY ARTERY DISEASE INVOLVING NATIVE HEART WITHOUT ANGINA PECTORIS, UNSPECIFIED VESSEL OR LESION TYPE: ICD-10-CM

## 2025-03-20 LAB
ALBUMIN SERPL BCG-MCNC: 4.6 G/DL (ref 3.5–5.2)
ALP SERPL-CCNC: 81 U/L (ref 40–150)
ALT SERPL W P-5'-P-CCNC: 17 U/L (ref 0–50)
ANION GAP SERPL CALCULATED.3IONS-SCNC: 15 MMOL/L (ref 7–15)
APO A-I SERPL-MCNC: 22 MG/DL
AST SERPL W P-5'-P-CCNC: 24 U/L (ref 0–45)
BILIRUB SERPL-MCNC: 0.3 MG/DL
BUN SERPL-MCNC: 16.7 MG/DL (ref 6–20)
CALCIUM SERPL-MCNC: 10.1 MG/DL (ref 8.8–10.4)
CHLORIDE SERPL-SCNC: 102 MMOL/L (ref 98–107)
CHOLEST SERPL-MCNC: 156 MG/DL
CREAT SERPL-MCNC: 0.96 MG/DL (ref 0.51–0.95)
EGFRCR SERPLBLD CKD-EPI 2021: 68 ML/MIN/1.73M2
ERYTHROCYTE [DISTWIDTH] IN BLOOD BY AUTOMATED COUNT: 14.1 % (ref 10–15)
EST. AVERAGE GLUCOSE BLD GHB EST-MCNC: 166 MG/DL
FASTING STATUS PATIENT QL REPORTED: NO
FASTING STATUS PATIENT QL REPORTED: NO
GLUCOSE SERPL-MCNC: 72 MG/DL (ref 70–99)
HBA1C MFR BLD: 7.4 %
HCO3 SERPL-SCNC: 23 MMOL/L (ref 22–29)
HCT VFR BLD AUTO: 41.3 % (ref 35–47)
HDLC SERPL-MCNC: 38 MG/DL
HGB BLD-MCNC: 13.7 G/DL (ref 11.7–15.7)
LDLC SERPL CALC-MCNC: 86 MG/DL
LDLC SERPL DIRECT ASSAY-MCNC: 91 MG/DL
LVEF ECHO: NORMAL
MCH RBC QN AUTO: 25.7 PG (ref 26.5–33)
MCHC RBC AUTO-ENTMCNC: 33.2 G/DL (ref 31.5–36.5)
MCV RBC AUTO: 78 FL (ref 78–100)
NONHDLC SERPL-MCNC: 118 MG/DL
PLATELET # BLD AUTO: 395 10E3/UL (ref 150–450)
POTASSIUM SERPL-SCNC: 3.9 MMOL/L (ref 3.4–5.3)
PROT SERPL-MCNC: 7.3 G/DL (ref 6.4–8.3)
RBC # BLD AUTO: 5.33 10E6/UL (ref 3.8–5.2)
SODIUM SERPL-SCNC: 140 MMOL/L (ref 135–145)
TRIGL SERPL-MCNC: 158 MG/DL
WBC # BLD AUTO: 14.8 10E3/UL (ref 4–11)

## 2025-03-20 PROCEDURE — 1125F AMNT PAIN NOTED PAIN PRSNT: CPT | Performed by: INTERNAL MEDICINE

## 2025-03-20 PROCEDURE — 3079F DIAST BP 80-89 MM HG: CPT | Performed by: INTERNAL MEDICINE

## 2025-03-20 PROCEDURE — 99213 OFFICE O/P EST LOW 20 MIN: CPT | Mod: 25 | Performed by: INTERNAL MEDICINE

## 2025-03-20 PROCEDURE — 3077F SYST BP >= 140 MM HG: CPT | Performed by: INTERNAL MEDICINE

## 2025-03-20 PROCEDURE — 93306 TTE W/DOPPLER COMPLETE: CPT

## 2025-03-20 PROCEDURE — 83036 HEMOGLOBIN GLYCOSYLATED A1C: CPT | Performed by: INTERNAL MEDICINE

## 2025-03-20 PROCEDURE — 83695 ASSAY OF LIPOPROTEIN(A): CPT | Performed by: INTERNAL MEDICINE

## 2025-03-20 PROCEDURE — 99000 SPECIMEN HANDLING OFFICE-LAB: CPT | Performed by: PATHOLOGY

## 2025-03-20 PROCEDURE — 77063 BREAST TOMOSYNTHESIS BI: CPT

## 2025-03-20 PROCEDURE — G0463 HOSPITAL OUTPT CLINIC VISIT: HCPCS | Performed by: INTERNAL MEDICINE

## 2025-03-20 PROCEDURE — 83721 ASSAY OF BLOOD LIPOPROTEIN: CPT | Performed by: INTERNAL MEDICINE

## 2025-03-20 PROCEDURE — 77067 SCR MAMMO BI INCL CAD: CPT

## 2025-03-20 ASSESSMENT — PAIN SCALES - GENERAL: PAINLEVEL_OUTOF10: MODERATE PAIN (6)

## 2025-03-20 NOTE — PATIENT INSTRUCTIONS
March 20, 2025    Cardiology Provider You Saw During Your Visit: Dr. Peace      Medication Changes: none      Follow Up: in one year with Dr. Peace with fasting labs beforehand    Follow the American Heart Association Diet and Lifestyle Recommendations:  -Limit saturated fat, trans fat, sodium, red meat, sweets and sugar-sweetened beverages. If you choose to eat red meat, compare labels and select the leanest cuts available.  -Aim for at least 150 minutes of moderate physical activity or 75 minutes of vigorous physical activity - or an equal combination of both - each week.      To Reach Us:  -During business hours: [phone], press option # 1 to schedule an appointment or to leave a message for your care team.     -After hours, weekends or holidays: [phone], press option #4 and ask to speak to the on-call cardiologist. Inform them you are a patient at the [facility].        **If you have a cardiac device, please make sure you schedule an in-person device check just prior to your cardiology provider appointments**        Yessy Calzada RN  Cardiology Care Coordinator - General Cardiology  [facility]

## 2025-03-20 NOTE — LETTER
3/20/2025      RE: Janine Cornell  [address] [facility] [address]       Dear Colleague,    Thank you for the opportunity to participate in the care of your patient, Janine Cornell, at the [facility]. Please see a copy of my visit note below.    No notes on file    Please do not hesitate to contact me if you have any questions/concerns.     Sincerely,     Milan Peace MD

## 2025-03-24 ENCOUNTER — ANCILLARY ORDERS (OUTPATIENT)
Dept: FAMILY MEDICINE | Facility: CLINIC | Age: 60
End: 2025-03-24
Payer: COMMERCIAL

## 2025-03-24 DIAGNOSIS — R92.8 ABNORMAL MAMMOGRAM OF LEFT BREAST: Primary | ICD-10-CM

## 2025-03-25 ENCOUNTER — PATIENT OUTREACH (OUTPATIENT)
Dept: CARE COORDINATION | Facility: CLINIC | Age: 60
End: 2025-03-25
Payer: COMMERCIAL

## 2025-03-25 NOTE — PROGRESS NOTES
Clinic Care Coordination Contact  Situation: Patient chart reviewed by care coordinator.    Background: Patient identified via high risk SDOH group for care coordination.    Assessment: Patient program for care coordination started.    Plan/Recommendations: Task assigned to CHW to contact patient and provide resources/offer care coordination.     FABIANA GIBBS RN on 3/25/2025 at 9:01 AM

## 2025-03-27 ENCOUNTER — INFUSION THERAPY VISIT (OUTPATIENT)
Dept: INFUSION THERAPY | Facility: CLINIC | Age: 60
End: 2025-03-27
Attending: INTERNAL MEDICINE
Payer: COMMERCIAL

## 2025-03-27 VITALS
TEMPERATURE: 98.1 F | SYSTOLIC BLOOD PRESSURE: 138 MMHG | BODY MASS INDEX: 27.86 KG/M2 | OXYGEN SATURATION: 98 % | RESPIRATION RATE: 18 BRPM | WEIGHT: 154.8 LBS | DIASTOLIC BLOOD PRESSURE: 90 MMHG | HEART RATE: 89 BPM

## 2025-03-27 DIAGNOSIS — M31.30 GRANULOMATOSIS WITH POLYANGIITIS WITHOUT RENAL INVOLVEMENT (H): Primary | ICD-10-CM

## 2025-03-27 DIAGNOSIS — I77.82 ANCA-ASSOCIATED VASCULITIS (H): ICD-10-CM

## 2025-03-27 PROCEDURE — 258N000003 HC RX IP 258 OP 636: Performed by: INTERNAL MEDICINE

## 2025-03-27 PROCEDURE — 250N000013 HC RX MED GY IP 250 OP 250 PS 637: Performed by: INTERNAL MEDICINE

## 2025-03-27 PROCEDURE — 250N000011 HC RX IP 250 OP 636: Mod: JZ | Performed by: INTERNAL MEDICINE

## 2025-03-27 RX ORDER — METHYLPREDNISOLONE SODIUM SUCCINATE 40 MG/ML
40 INJECTION INTRAMUSCULAR; INTRAVENOUS
Start: 2025-04-10

## 2025-03-27 RX ORDER — ALBUTEROL SULFATE 90 UG/1
1-2 INHALANT RESPIRATORY (INHALATION)
Start: 2025-04-10

## 2025-03-27 RX ORDER — ACETAMINOPHEN 325 MG/1
650 TABLET ORAL ONCE
Start: 2025-04-10

## 2025-03-27 RX ORDER — HEPARIN SODIUM (PORCINE) LOCK FLUSH IV SOLN 100 UNIT/ML 100 UNIT/ML
5 SOLUTION INTRAVENOUS
OUTPATIENT
Start: 2025-04-10

## 2025-03-27 RX ORDER — METHYLPREDNISOLONE SODIUM SUCCINATE 40 MG/ML
80 INJECTION INTRAMUSCULAR; INTRAVENOUS ONCE
Status: COMPLETED | OUTPATIENT
Start: 2025-03-27 | End: 2025-03-27

## 2025-03-27 RX ORDER — HEPARIN SODIUM,PORCINE 10 UNIT/ML
5-20 VIAL (ML) INTRAVENOUS DAILY PRN
OUTPATIENT
Start: 2025-04-10

## 2025-03-27 RX ORDER — HYDROCORTISONE SODIUM SUCCINATE 100 MG/2ML
100 INJECTION INTRAMUSCULAR; INTRAVENOUS ONCE
Status: COMPLETED | OUTPATIENT
Start: 2025-03-27 | End: 2025-03-27

## 2025-03-27 RX ORDER — HYDROCORTISONE SODIUM SUCCINATE 100 MG/2ML
100 INJECTION INTRAMUSCULAR; INTRAVENOUS ONCE
Status: DISCONTINUED | OUTPATIENT
Start: 2025-03-27 | End: 2025-03-27

## 2025-03-27 RX ORDER — ACETAMINOPHEN 325 MG/1
650 TABLET ORAL ONCE
Status: COMPLETED | OUTPATIENT
Start: 2025-03-27 | End: 2025-03-27

## 2025-03-27 RX ORDER — HYDROCORTISONE SODIUM SUCCINATE 100 MG/2ML
100 INJECTION INTRAMUSCULAR; INTRAVENOUS ONCE
Status: CANCELLED
Start: 2025-04-10 | End: 2025-04-10

## 2025-03-27 RX ORDER — DIPHENHYDRAMINE HYDROCHLORIDE 50 MG/ML
50 INJECTION, SOLUTION INTRAMUSCULAR; INTRAVENOUS
Start: 2025-04-10

## 2025-03-27 RX ORDER — EPINEPHRINE 1 MG/ML
0.3 INJECTION, SOLUTION, CONCENTRATE INTRAVENOUS EVERY 5 MIN PRN
OUTPATIENT
Start: 2025-04-10

## 2025-03-27 RX ORDER — DIPHENHYDRAMINE HYDROCHLORIDE 50 MG/ML
25 INJECTION, SOLUTION INTRAMUSCULAR; INTRAVENOUS
Start: 2025-04-10

## 2025-03-27 RX ORDER — ALBUTEROL SULFATE 0.83 MG/ML
2.5 SOLUTION RESPIRATORY (INHALATION)
OUTPATIENT
Start: 2025-04-10

## 2025-03-27 RX ORDER — DIPHENHYDRAMINE HYDROCHLORIDE 50 MG/ML
50 INJECTION, SOLUTION INTRAMUSCULAR; INTRAVENOUS ONCE
Status: DISCONTINUED | OUTPATIENT
Start: 2025-03-27 | End: 2025-03-27

## 2025-03-27 RX ORDER — MEPERIDINE HYDROCHLORIDE 25 MG/ML
25 INJECTION INTRAMUSCULAR; INTRAVENOUS; SUBCUTANEOUS
OUTPATIENT
Start: 2025-04-10

## 2025-03-27 RX ORDER — METHYLPREDNISOLONE SODIUM SUCCINATE 125 MG/2ML
80 INJECTION INTRAMUSCULAR; INTRAVENOUS ONCE
OUTPATIENT
Start: 2025-04-10

## 2025-03-27 RX ADMIN — METHYLPREDNISOLONE SODIUM SUCCINATE 80 MG: 40 INJECTION, POWDER, FOR SOLUTION INTRAMUSCULAR; INTRAVENOUS at 08:58

## 2025-03-27 RX ADMIN — SODIUM CHLORIDE 1000 MG: 0.9 INJECTION, SOLUTION INTRAVENOUS at 09:53

## 2025-03-27 RX ADMIN — HYDROCORTISONE SODIUM SUCCINATE 100 MG: 100 INJECTION, POWDER, FOR SOLUTION INTRAMUSCULAR; INTRAVENOUS at 13:18

## 2025-03-27 RX ADMIN — DIPHENHYDRAMINE HYDROCHLORIDE 50 MG: 50 INJECTION, SOLUTION INTRAMUSCULAR; INTRAVENOUS at 09:25

## 2025-03-27 RX ADMIN — ACETAMINOPHEN 650 MG: 325 TABLET, FILM COATED ORAL at 08:54

## 2025-03-27 ASSESSMENT — PAIN SCALES - GENERAL: PAINLEVEL_OUTOF10: MODERATE PAIN (6)

## 2025-03-27 NOTE — PROGRESS NOTES
"Infusion Nursing Note:  Janine Cornell presents today for rituximab.    Patient seen by provider today: No   present during visit today: Not Applicable.    Note: Patient states she is not feeling well today.  \"Her neighbor kept her up all night\"  Dr. Mckinnon was notified of \"yes\" answers on checklist, and ok was given to infuse today.  Premeds given and truxima started at 50ml/hr, increased 50ml/hr every 30 minutes to a max rate of 200ml/hr.  Hydrocortisone given mid way through per patient request.       Intravenous Access:  Peripheral IV placed.    Treatment Conditions:  Biological Infusion Checklist:  ~~~ NOTE: If the patient answers yes to any of the questions below, hold the infusion and contact ordering provider or on-call provider.    Have you recently had an elevated temperature, fever, chills, productive cough, coughing for 3 weeks or longer or hemoptysis,  abnormal vital signs, night sweats,  chest pain or have you noticed a decrease in your appetite, unexplained weight loss or fatigue? No  Do you have any open wounds or new incisions? No  Do you have any upcoming hospitalizations or surgeries? Does not include esophagogastroduodenoscopy, colonoscopy, endoscopic retrograde cholangiopancreatography (ERCP), endoscopic ultrasound (EUS), dental procedures or joint aspiration/steroid injections No  Do you currently have any signs of illness or infection or are you on any antibiotics? No  Have you had any new, sudden or worsening abdominal pain? YES, abdominal pain for weeks, comes and goes  Have you or anyone in your household received a live vaccination in the past 4 weeks? Please note: No live vaccines while on biologic/chemotherapy until 6 months after the last treatment. Patient can receive the flu vaccine (shot only), pneumovax and the Covid vaccine. It is optimal for the patient to get these vaccines mid cycle, but they can be given at any time as long as it is not on the day of the infusion. " "No  Have you recently been diagnosed with any new nervous system diseases (ie. Multiple sclerosis, Guillain [place], seizures, neurological changes) or cancer diagnosis? Are you on any form of radiation or chemotherapy? No  Are you pregnant or breast feeding or do you have plans of pregnancy in the future? No  Have you been having any signs of worsening depression or suicidal ideations?  (benlysta only) No  Have there been any other new onset medical symptoms? More pain in right side of body, veins feel \"bulgy\" and hands feel swollen which causes more pain.  Also experiencing more tingling.  Have you had any new blood clots? (IVIG only) No  Dr. Mckinnon notified of above and ok was given to infuse rituxan today and to follow up with her PCP.  Patient has appointment scheduled 4/9..    Post Infusion Assessment:  Patient tolerated infusion without incident, however after the infusion patient stated that she felt \"more swollen\" than she had this morning.  States this usually happens about a week following infusion due to the steroids, but was surprised she feels this way now.  She denies any breathing issues or any constriction with her throat or airway.  Enforced that if she does experience any breathing/constriction she needs to call 911.  Patient agrees.  In basket message sent to Dr. Mckinnon.  Blood return noted pre and post infusion.  Site patent and intact, free from redness, edema or discomfort.  No evidence of extravasations.  Access discontinued per protocol.  Biologic Infusion Post Education: Call the triage nurse at your clinic or seek medical attention if you have chills and/or temperature greater than or equal to 100.5, uncontrolled nausea/vomiting, diarrhea, constipation, dizziness, shortness of breath, chest pain, heart palpitations, weakness or any other new or concerning symptoms, questions or concerns.  You cannot have any live virus vaccines prior to or during treatment or up to 6 months post infusion.  " If you have an upcoming surgery, medical procedure or dental procedure during treatment, this should be discussed with your ordering physician and your surgeon/dentist.  If you are having any concerning symptom, if you are unsure if you should get your next infusion or wish to speak to a provider before your next infusion, please call your care coordinator or triage nurse at your clinic to notify them so we can adequately serve you.       Discharge Plan:   Patient discharged in stable condition accompanied by: self.  Departure Mode: Ambulatory.  Will return to clinic in 2 weeks.    Nahed Yang RN

## 2025-04-09 ENCOUNTER — ANCILLARY PROCEDURE (OUTPATIENT)
Dept: MAMMOGRAPHY | Facility: CLINIC | Age: 60
End: 2025-04-09
Attending: FAMILY MEDICINE
Payer: COMMERCIAL

## 2025-04-09 ENCOUNTER — OFFICE VISIT (OUTPATIENT)
Dept: FAMILY MEDICINE | Facility: CLINIC | Age: 60
End: 2025-04-09
Payer: COMMERCIAL

## 2025-04-09 VITALS
RESPIRATION RATE: 18 BRPM | SYSTOLIC BLOOD PRESSURE: 109 MMHG | HEART RATE: 70 BPM | WEIGHT: 155.7 LBS | TEMPERATURE: 98.9 F | DIASTOLIC BLOOD PRESSURE: 73 MMHG | BODY MASS INDEX: 27.59 KG/M2 | HEIGHT: 63 IN | OXYGEN SATURATION: 100 %

## 2025-04-09 DIAGNOSIS — M51.34 DEGENERATION OF THORACIC INTERVERTEBRAL DISC: ICD-10-CM

## 2025-04-09 DIAGNOSIS — M48.02 CERVICAL STENOSIS OF SPINAL CANAL: Primary | ICD-10-CM

## 2025-04-09 DIAGNOSIS — E11.9 TYPE 2 DIABETES, HBA1C GOAL < 7% (H): ICD-10-CM

## 2025-04-09 DIAGNOSIS — E04.1 THYROID NODULE: ICD-10-CM

## 2025-04-09 DIAGNOSIS — Q45.3 PANCREATIC ABNORMALITY: ICD-10-CM

## 2025-04-09 DIAGNOSIS — M48.062 SPINAL STENOSIS, LUMBAR REGION, WITH NEUROGENIC CLAUDICATION: ICD-10-CM

## 2025-04-09 DIAGNOSIS — R92.8 ABNORMAL MAMMOGRAM OF LEFT BREAST: ICD-10-CM

## 2025-04-09 DIAGNOSIS — M31.30 GRANULOMATOSIS WITH POLYANGIITIS, UNSPECIFIED WHETHER RENAL INVOLVEMENT (H): ICD-10-CM

## 2025-04-09 PROCEDURE — 3078F DIAST BP <80 MM HG: CPT | Performed by: FAMILY MEDICINE

## 2025-04-09 PROCEDURE — 3074F SYST BP LT 130 MM HG: CPT | Performed by: FAMILY MEDICINE

## 2025-04-09 PROCEDURE — 77065 DX MAMMO INCL CAD UNI: CPT | Mod: LT | Performed by: RADIOLOGY

## 2025-04-09 PROCEDURE — G0279 TOMOSYNTHESIS, MAMMO: HCPCS | Performed by: RADIOLOGY

## 2025-04-09 PROCEDURE — 76642 ULTRASOUND BREAST LIMITED: CPT | Mod: LT | Performed by: RADIOLOGY

## 2025-04-09 PROCEDURE — 99215 OFFICE O/P EST HI 40 MIN: CPT | Performed by: FAMILY MEDICINE

## 2025-04-09 PROCEDURE — G2211 COMPLEX E/M VISIT ADD ON: HCPCS | Performed by: FAMILY MEDICINE

## 2025-04-09 ASSESSMENT — ASTHMA QUESTIONNAIRES
ACT_TOTALSCORE: 10
QUESTION_4 LAST FOUR WEEKS HOW OFTEN HAVE YOU USED YOUR RESCUE INHALER OR NEBULIZER MEDICATION (SUCH AS ALBUTEROL): ONE OR TWO TIMES PER DAY
QUESTION_3 LAST FOUR WEEKS HOW OFTEN DID YOUR ASTHMA SYMPTOMS (WHEEZING, COUGHING, SHORTNESS OF BREATH, CHEST TIGHTNESS OR PAIN) WAKE YOU UP AT NIGHT OR EARLIER THAN USUAL IN THE MORNING: FOUR OR MORE NIGHTS A WEEK
QUESTION_2 LAST FOUR WEEKS HOW OFTEN HAVE YOU HAD SHORTNESS OF BREATH: MORE THAN ONCE A DAY
QUESTION_1 LAST FOUR WEEKS HOW MUCH OF THE TIME DID YOUR ASTHMA KEEP YOU FROM GETTING AS MUCH DONE AT WORK, SCHOOL OR AT HOME: SOME OF THE TIME
QUESTION_5 LAST FOUR WEEKS HOW WOULD YOU RATE YOUR ASTHMA CONTROL: SOMEWHAT CONTROLLED

## 2025-04-09 NOTE — PROGRESS NOTES
"  Assessment & Plan     Cervical stenosis of spinal canal    - Physical Therapy  Referral; Future    Spinal stenosis, lumbar region, with neurogenic claudication    - Physical Therapy  Referral; Future    Degeneration of thoracic intervertebral disc    - Physical Therapy  Referral; Future    Thyroid nodule    - US Thyroid; Future    Type 2 diabetes, HbA1c goal < 7% (H)    - Adult Eye  Referral; Future    Pancreatic abnormality    - Adult GI  Referral - Procedure Only; Future    Granulomatosis with polyangiitis, unspecified whether renal involvement (H)    - Physical Therapy  Referral; Future    The longitudinal plan of care for the diagnosis(es)/condition(s) as documented were addressed during this visit. Due to the added complexity in care, I will continue to support Janine in the subsequent management and with ongoing continuity of care.  50 minutes spent by me on the date of the encounter doing chart review, history and exam, documentation and further activities per the note      BMI  Estimated body mass index is 28.01 kg/m  as calculated from the following:    Height as of this encounter: 1.588 m (5' 2.52\").    Weight as of this encounter: 70.6 kg (155 lb 11.2 oz).             No follow-ups on file.    Subjective   Janine is a 59 year old, presenting for the following health issues:  Follow Up      4/9/2025     2:39 PM   Additional Questions   Roomed by Kasandra DAILEY   Accompanied by N/A     History of Present Illness       Reason for visit:  Previous specialists visits       Lengthy review of tests and consults and clinical course since our last visit last summer  1-just today, right before saw me, follow-up on abnl mammogram done showed need for biopsy; discussed to best of my abilities; her mom had a mastectomy she believes.   2-Reviewed why we would do another thyroid US; last years suggested FNA could be considered, pt discussed at Endo visit, in that note we " see that the pt agreed to redo US in a year, which is now  3-last spring GI notes reviewed, plan was EUS pancreas, order placed, not done, no acute or new interval GI sx, reviewed today w/ Janine why they wanted to do EUS, she agrees to do this, order placed and I've messaged her GI provider that I've done so  4-bilat arm and leg neuralgias; she has C T and L spine MR's last summer, has not seen me since, reivewed several areas where impingment could be occurring, she does not wish to see Spine MD now (has referral from Rheum to Spine from November). Open to acupuncture w/ a provider at [facility] who has helped her in the past, I told Janine if needs referral let me know I'll sign one. Also she is open to do Pool PT, has done in past w/ help, will use [place] her preference. Reviewed w/ DM perhaps she has element DM PN.  5-due for eye MD    Reviewed all provider notes since I saw her in July    Past Medical History:   Diagnosis Date    Abnormal glandular Papanicolaou smear of cervix 1992    Allergic rhinitis     Allergy, airborne subst    Arthritis     ASCVD (arteriosclerotic cardiovascular disease)     Chronic pain     Chronic pancreatitis (H)     idiopathic, Tx: PPI, antioxidants, pancreatic enzymes    Common migraine     Coronary artery disease     Costochondritis     Difficulty in walking(719.7)     Dyspnea on exertion     Ectasia, mammary duct     [facility], persistent nipple discharge    Elevated fasting glucose     Gastro-oesophageal reflux disease     Granulomatosis with polyangiitis (H)     Hernia     History of angina     Hyperlipidemia LDL goal < 100     Hypertension goal BP (blood pressure) < 140/90     Essential hypertension    Iron deficiency anemia     Ischemic cardiomyopathy     Menorrhagia     Migraine headaches     Mild persistent asthma     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 Matias, neg w/u, Rx'd w/prednisone    Ventral hernia, unspecified, without mention of obstruction or gangrene 2012     Past Surgical History:   Procedure Laterality Date    C/SECTION, LOW TRANSVERSE  1996        CARDIAC SURGERY      cardiac stent- recent in 16; 4 other stents    COLONOSCOPY N/A 2023    Procedure: COLONOSCOPY, WITH POLYPECTOMY;  Surgeon: Percy Gross MD;  Location: [facility] OR    DILATION AND CURETTAGE N/A 2016    Procedure: DILATION AND CURETTAGE;  Surgeon: Nahed Butler MD;  [facility]    ENDOMETRIAL SAMPLING (BIOPSY) N/A 2023    Procedure: ENDOMETRIAL BIOPSY;  Surgeon: Nahed Butler MD;  [facility]    ESOPHAGOSCOPY, GASTROSCOPY, DUODENOSCOPY (EGD), COMBINED N/A 2022    Procedure: ESOPHAGOGASTRODUODENOSCOPY, WITH BIOPSY;  Surgeon: Enzo Sesay MD;  [facility]: [facility]    ESOPHAGOSCOPY, GASTROSCOPY, DUODENOSCOPY (EGD), COMBINED N/A 2023    Procedure: ESOPHAGOGASTRODUODENOSCOPY, WITH BIOPSY;  Surgeon: Percy Gross MD;  Location: [facility] OR    EXAM UNDER ANESTHESIA PELVIC N/A 2023    Procedure: EXAM UNDER ANESTHESIA;  Surgeon: Nahed Butler MD;  [facility]    HC UGI ENDOSCOPY W EUS  2008    Dr. Pastrana, possible chronic pancreatitis, fatty liver    HERNIA REPAIR  2012    done at [facility]    INSERT INTRAUTERINE DEVICE N/A 2016     Procedure: INSERT INTRAUTERINE DEVICE;  Surgeon: Nahed Butler MD;  [facility]    INT UTERINE FIBRIOD EMBOLIZATION  10/29/2014    LAPAROSCOPIC CHOLECYSTECTOMY  05/28/2008    Dr. Arnol GRUBBS TX, CERVICAL  1992    s/p HUMERA, done at [facility] in [place].    ORBITOTOMY Right 03/15/2016    Procedure: ORBITOTOMY;  Surgeon: Myron Cyr MD;  [facility]    ORBITOTOMY Right 08/04/2017    Procedure: ORBITOTOMY;  RIGHT ORBITOTOMY AND BIOPSY;  Surgeon: Charis Holbrook MD;  Location: [facility]    REMOVE INTRAUTERINE DEVICE N/A 4/28/2023    Procedure: Remove intrauterine device,;  Surgeon: Nahed Butler MD;  [facility]    REPAIR PTOSIS Right 11/17/2017    Procedure: REPAIR PTOSIS;  RIGHT UPPER LID PTOSIS REPAIR;  Surgeon: Myron Cyr MD;  Location: [facility]    UPPER GI ENDOSCOPY  10/21/2008    mild gastritis, Dr. Rocky [facility] ECHO HEART XTHORACIC,COMPLETE, W/O DOPPLER  02/04/2004    [facility] WNL, EF 60%     Current Outpatient Medications   Medication Sig Dispense Refill    acetaminophen (TYLENOL) 325 MG tablet Take 1-2 tablets (325-650 mg) by mouth every 6 hours as needed for pain (headache) 250 tablet 4    ADVAIR DISKUS 250-50 MCG/ACT inhaler Inhale 1 puff into the lungs 2 times daily      albuterol (PROAIR HFA, PROVENTIL HFA, VENTOLIN HFA) 108 (90 BASE) MCG/ACT inhaler Inhale 2 puffs into the lungs every 6 hours as needed for shortness of breath / dyspnea or wheezing 3 Inhaler 1    BANOPHEN 25 MG tablet Take 25 mg by mouth At Bedtime      blood glucose (NO BRAND SPECIFIED) lancets standard Use to test blood sugar 1-4 times daily or as directed. 400 each 3    blood glucose (NO BRAND SPECIFIED) test strip Use to test blood sugar 1-4 times daily or as directed 400 strip 3    blood glucose monitoring (NO BRAND SPECIFIED) meter device kit Use to test blood sugar 1-4 times daily or as directed. 1 kit 0    Blood Pressure Monitor KIT 1 each daily Monitor home blood  pressure as instructed by physician.  Dispense [facility] blood pressure kit. 1 kit 0    clopidogrel (PLAVIX) 75 MG tablet Take 1 tablet (75 mg) by mouth daily. . 30 tablet 3    clotrimazole (MYCELEX) 10 MG lozenge Place 1 lozenge (10 mg) inside cheek 5 times daily 50 lozenge 1    dicyclomine (BENTYL) 20 MG tablet TAKE 1 TABLET(20 MG) BY MOUTH FOUR TIMES DAILY AS NEEDED. 60 tablet 4    empagliflozin (JARDIANCE) 25 MG TABS tablet Take 1 tablet (25 mg) by mouth daily. Follow up visit needed. Call  to schedule. 90 tablet 0    EPIPEN 2-RIKY 0.3 MG/0.3ML injection INJECT 0.3 MG INTO THE MUSCLE PRF ANAPHYALAXIS  0    lisinopril-hydrochlorothiazide (ZESTORETIC) 20-25 MG tablet Take 1 tablet by mouth daily. 30 tablet 3    metFORMIN (GLUCOPHAGE XR) 500 MG 24 hr tablet Take 4 tablets (2,000 mg) by mouth daily (with dinner) 360 tablet 3    metoprolol succinate ER (TOPROL XL) 100 MG 24 hr tablet Take 1 tablet (100 mg) by mouth daily. 30 tablet 3    ondansetron (ZOFRAN ODT) 8 MG ODT tab Take 1 tablet (8 mg) by mouth every 8 hours as needed for nausea. 20 tablet 1    pravastatin (PRAVACHOL) 40 MG tablet TAKE 1 TABLET BY MOUTH EVERY DAY 30 tablet 3    pregabalin (LYRICA) 25 MG capsule Take 2 capsules (50 mg) by mouth daily. 180 capsule 1    sennosides (SENOKOT) 8.6 MG tablet 1-2 tabs a day as needed for constipation 60 tablet 1    spironolactone (ALDACTONE) 25 MG tablet TAKE 1 TABLET (25 MG) BY MOUTH DAILY. MAY ALSO TAKE 0.5 TABLETS (12.5 MG) DAILY AS NEEDED (FOR WEIGHT GAIN). 45 tablet 3    sucralfate (CARAFATE) 1 GM tablet Take 1 tablet (1 g) by mouth 4 times daily 120 tablet 3    YUVAFEM 10 MCG TABS vaginal tablet PLACE 1 TABLET (10 MCG) VAGINALLY TWICE A WEEK 24 tablet 1    albuterol (2.5 MG/3ML) 0.083% neb solution INL 1 VIAL VIA NEBULIZATION Q 4 TO 6 HOURS PRN (Patient not taking: Reported on 4/9/2025)  1    calcium carbonate (TUMS) 500 MG chewable tablet Take 3-4 tablets (1,500-2,000 mg) by mouth daily as needed  (Patient not taking: Reported on 4/9/2025) 90 tablet 3    cyanocobalamin (VITAMIN B-12) 1000 MCG tablet Take 1 tablet by mouth daily at 2 pm (Patient not taking: Reported on 4/9/2025)      cyclobenzaprine (FLEXERIL) 10 MG tablet Take 1 tablet (10 mg) by mouth 2 times daily as needed for muscle spasms (Patient not taking: Reported on 4/9/2025) 60 tablet 3    cycloSPORINE (RESTASIS) 0.05 % ophthalmic emulsion 1 month supply 11 refills (Patient not taking: Reported on 4/9/2025) 1 each 11    esomeprazole (NEXIUM) 40 MG DR capsule Take 1 capsule (40 mg) by mouth 2 times daily Take 30-60 minutes before eating. (Patient not taking: Reported on 4/9/2025) 180 capsule 0    fluticasone (FLONASE) 50 MCG/ACT nasal spray Spray 1 spray in nostril as needed (Patient not taking: Reported on 4/9/2025)      folic acid (FOLVITE) 1 MG tablet TAKE 1 TABLET BY MOUTH EVERY DAY (Patient not taking: Reported on 4/9/2025) 90 tablet 0    insulin glargine (LANTUS PEN) 100 UNIT/ML pen Inject 56 Units Subcutaneous every morning Or as directed. (Patient not taking: Reported on 4/9/2025) 75 mL 1    insulin pen needle (BD PEN NEEDLE MARCK 2ND GEN) 32G X 4 MM miscellaneous USE ONE PEN NEEDLE DAILY OR AS DIRECTED (Patient not taking: Reported on 4/9/2025) 100 each 2    magnesium 250 MG tablet TAKE 1 TABLET(250 MG) BY MOUTH TWICE DAILY (Patient not taking: Reported on 4/9/2025) 60 tablet 3    Magnesium Oxide 250 MG tablet TAKE 1 TABLET(250 MG) BY MOUTH TWICE DAILY (Patient not taking: Reported on 4/9/2025) 60 tablet 3    Multiple Vitamin (DAILY-GERALD MULTIVITAMIN) TABS TAKE 1 TABLET BY MOUTH EVERY DAY (Patient not taking: Reported on 4/9/2025) 100 tablet 3    olopatadine (PATANOL) 0.1 % ophthalmic solution Place 1 drop into both eyes as needed (Patient not taking: Reported on 4/9/2025)      traMADol (ULTRAM) 50 MG tablet TAKE 1 TABLET(50 MG) BY MOUTH EVERY 8 HOURS AS NEEDED FOR SEVERE PAIN (Patient not taking: Reported on 4/9/2025) 60 tablet 3     No  current facility-administered medications for this visit.     Allergies   Allergen Reactions    Amoxicillin-Pot Clavulanate      Unknown possible hives and edema    Amoxicillin-Pot Clavulanate     Artificial Sweetner (Informational Only)  Other (See Comments)     Increased headache    Azithromycin     Clavulanic Acid     Diatrizoate Other (See Comments)     Pt wants this listed because she is allergic to shellfish     Imitrex [Triptans]      Severe face/neck/chest tightness and flushing side effects     Penicillins Hives     Unknown     Pork Allergy      Stomach pain, cramping, diarrhea, itching, nausea and headaches    Shellfish Allergy Hives and Swelling    Shellfish-Derived Products     Sulfa Antibiotics Hives and Swelling    Sulfatolamide     Zithromax [Azithromycin Dihydrate] Swelling     Unknown      Family History   Problem Relation Age of Onset    Hypertension Mother     Arthritis Mother     Heart Disease Mother         long qt syndrome    Thyroid Disease Mother     C.A.D. Mother     Heart Disease Father 50        heart attack    Cerebrovascular Disease Father     Diabetes Father     Hypertension Father     Depression Father     C.A.D. Father     Neurologic Disorder Sister         migraines    Neurologic Disorder Sister         migraines    Heart Disease Brother 15        MI at 15, 16.     Arthritis Brother         he passed away, had severe arthritis at age 11    C.A.D. Brother     Anesthesia Reaction Son         PONV    Respiratory Son         asthma    Hypertension Maternal Aunt     Diabetes Maternal Uncle     Hypertension Maternal Uncle     Arthritis Maternal Uncle     Eye Disorder Maternal Uncle         cataracts    Cerebrovascular Disease Maternal Uncle     Family History Negative Other         neg for RA, SLE    Unknown/Adopted No family hx of         unknown neurological issues in her family, mother was adopted    Skin Cancer No family hx of         No known family hx of skin cancer    Deep  Vein Thrombosis (DVT) No family hx of      Social History     Socioeconomic History    Marital status: Single     Spouse name: Not on file    Number of children: 1    Years of education: Not on file    Highest education level: Not on file   Occupational History     Employer: NONE    Tobacco Use    Smoking status: Some Days     Current packs/day: 0.00     Average packs/day: 0.2 packs/day for 1 year (0.2 ttl pk-yrs)     Types: Cigarettes     Start date: 2015     Last attempt to quit: 2016     Years since quittin.1    Smokeless tobacco: Never   Vaping Use    Vaping status: Every Day    Substances: Nicotine   Substance and Sexual Activity    Alcohol use: No     Alcohol/week: 0.0 standard drinks of alcohol    Drug use: No    Sexual activity: Not Currently   Other Topics Concern    Parent/sibling w/ CABG, MI or angioplasty before 65F 55M? Yes   Social History Narrative    Balanced Diet - sometimes    Osteoporosis Prevention Measures - Dairy servings per day: 2 servings weekly    Regular Exercise -  Yes Describe walking 4 times a week    Dental Exam - NO    Seatbelts used - Yes    Self Breast Exam - Yes    Abuse: Current or Past (Physical, Sexual or Emotional)- No    Do you have any concerns about STD's -  No    Do you feel safe in your environment - No    Guns stored in the home - No    Sunscreen used - Yes    Lipids -  YES - Date:     Glucose -  YES - Date:     Eye Exam - YES - Date: one year ago    Colon Cancer Screening - No    Hemoccults - NO    Pap Test -  YES - Date: , remote history of LEEP    Mammography - YES - Date: last spring, would like to discuss, needs a referral to [facility]    DEXA - NO    Immunizations reviewed and up to date - Yes, last td given in  or      Social Drivers of Health     Financial Resource Strain: Low Risk  (2024)    Financial Resource Strain     Within the past 12 months, have you or your family members you live with been unable  "to get utilities (heat, electricity) when it was really needed?: No   Food Insecurity: High Risk (2/9/2024)    Food Insecurity     Within the past 12 months, did you worry that your food would run out before you got money to buy more?: Yes     Within the past 12 months, did the food you bought just not last and you didn t have money to get more?: No   Transportation Needs: Low Risk  (2/9/2024)    Transportation Needs     Within the past 12 months, has lack of transportation kept you from medical appointments, getting your medicines, non-medical meetings or appointments, work, or from getting things that you need?: No   Physical Activity: Not on file   Stress: Not on file   Social Connections: Not on file   Interpersonal Safety: Low Risk  (4/9/2025)    Interpersonal Safety     Do you feel physically and emotionally safe where you currently live?: Yes     Within the past 12 months, have you been hit, slapped, kicked or otherwise physically hurt by someone?: No     Within the past 12 months, have you been humiliated or emotionally abused in other ways by your partner or ex-partner?: No   Housing Stability: Low Risk  (2/9/2024)    Housing Stability     Do you have housing? : Yes     Are you worried about losing your housing?: No           Objective    /73 (BP Location: Right arm, Patient Position: Sitting, Cuff Size: Adult Regular)   Pulse 70   Temp 98.9  F (37.2  C)   Resp 18   Ht 1.588 m (5' 2.52\")   Wt 70.6 kg (155 lb 11.2 oz)   LMP  (LMP Unknown)   SpO2 100%   BMI 28.01 kg/m    Body mass index is 28.01 kg/m .  Physical Exam   GENERAL: alert and no distress  MS: no gross musculoskeletal defects noted, no edema          Signed Electronically by: Kash Solano MD    "

## 2025-04-10 ENCOUNTER — INFUSION THERAPY VISIT (OUTPATIENT)
Dept: INFUSION THERAPY | Facility: CLINIC | Age: 60
End: 2025-04-10
Attending: INTERNAL MEDICINE
Payer: COMMERCIAL

## 2025-04-10 VITALS
RESPIRATION RATE: 18 BRPM | SYSTOLIC BLOOD PRESSURE: 133 MMHG | TEMPERATURE: 98.5 F | WEIGHT: 155.9 LBS | DIASTOLIC BLOOD PRESSURE: 80 MMHG | OXYGEN SATURATION: 97 % | HEART RATE: 73 BPM | BODY MASS INDEX: 28.04 KG/M2

## 2025-04-10 DIAGNOSIS — Z51.81 ENCOUNTER FOR MEDICATION MONITORING: ICD-10-CM

## 2025-04-10 DIAGNOSIS — Z51.81 MEDICATION MONITORING ENCOUNTER: ICD-10-CM

## 2025-04-10 DIAGNOSIS — I77.82 ANCA-ASSOCIATED VASCULITIS (H): ICD-10-CM

## 2025-04-10 DIAGNOSIS — M31.30 GRANULOMATOSIS WITH POLYANGIITIS WITHOUT RENAL INVOLVEMENT (H): Primary | ICD-10-CM

## 2025-04-10 LAB
ALBUMIN MFR UR ELPH: <6 MG/DL
ALBUMIN SERPL BCG-MCNC: 4.5 G/DL (ref 3.5–5.2)
ALBUMIN UR-MCNC: NEGATIVE MG/DL
ALT SERPL W P-5'-P-CCNC: 18 U/L (ref 0–50)
APPEARANCE UR: CLEAR
AST SERPL W P-5'-P-CCNC: 20 U/L (ref 0–45)
BASOPHILS # BLD AUTO: 0.1 10E3/UL (ref 0–0.2)
BASOPHILS NFR BLD AUTO: 0 %
BILIRUB UR QL STRIP: NEGATIVE
CD19 B CELL COMMENT: ABNORMAL
CD19 CELLS # BLD: <1 CELLS/UL (ref 107–698)
CD19 CELLS NFR BLD: <1 % (ref 6–27)
COLOR UR AUTO: ABNORMAL
CREAT SERPL-MCNC: 1.01 MG/DL (ref 0.51–0.95)
CREAT UR-MCNC: 80.8 MG/DL
CRP SERPL-MCNC: 3.3 MG/L
EGFRCR SERPLBLD CKD-EPI 2021: 64 ML/MIN/1.73M2
EOSINOPHIL # BLD AUTO: 0.4 10E3/UL (ref 0–0.7)
EOSINOPHIL NFR BLD AUTO: 4 %
ERYTHROCYTE [DISTWIDTH] IN BLOOD BY AUTOMATED COUNT: 14.1 % (ref 10–15)
ERYTHROCYTE [SEDIMENTATION RATE] IN BLOOD BY WESTERGREN METHOD: 21 MM/HR (ref 0–30)
GLUCOSE UR STRIP-MCNC: >=1000 MG/DL
HCT VFR BLD AUTO: 42.1 % (ref 35–47)
HGB BLD-MCNC: 13.6 G/DL (ref 11.7–15.7)
HGB UR QL STRIP: NEGATIVE
IGA SERPL-MCNC: 160 MG/DL (ref 84–499)
IGG SERPL-MCNC: 544 MG/DL (ref 610–1616)
IGM SERPL-MCNC: 28 MG/DL (ref 35–242)
IMM GRANULOCYTES # BLD: 0 10E3/UL
IMM GRANULOCYTES NFR BLD: 0 %
KETONES UR STRIP-MCNC: NEGATIVE MG/DL
LEUKOCYTE ESTERASE UR QL STRIP: NEGATIVE
LYMPHOCYTES # BLD AUTO: 2.2 10E3/UL (ref 0.8–5.3)
LYMPHOCYTES NFR BLD AUTO: 19 %
MCH RBC QN AUTO: 25.9 PG (ref 26.5–33)
MCHC RBC AUTO-ENTMCNC: 32.3 G/DL (ref 31.5–36.5)
MCV RBC AUTO: 80 FL (ref 78–100)
MONOCYTES # BLD AUTO: 0.8 10E3/UL (ref 0–1.3)
MONOCYTES NFR BLD AUTO: 6 %
MUCOUS THREADS #/AREA URNS LPF: PRESENT /LPF
NEUTROPHILS # BLD AUTO: 8.4 10E3/UL (ref 1.6–8.3)
NEUTROPHILS NFR BLD AUTO: 71 %
NITRATE UR QL: NEGATIVE
NRBC # BLD AUTO: 0 10E3/UL
NRBC BLD AUTO-RTO: 0 /100
PH UR STRIP: 5 [PH] (ref 5–7)
PLATELET # BLD AUTO: 397 10E3/UL (ref 150–450)
PROT/CREAT 24H UR: NORMAL MG/G{CREAT}
RBC # BLD AUTO: 5.26 10E6/UL (ref 3.8–5.2)
RBC URINE: <1 /HPF
SP GR UR STRIP: 1.01 (ref 1–1.03)
SQUAMOUS EPITHELIAL: 2 /HPF
UROBILINOGEN UR STRIP-MCNC: NORMAL MG/DL
WBC # BLD AUTO: 11.9 10E3/UL (ref 4–11)
WBC URINE: <1 /HPF

## 2025-04-10 PROCEDURE — 84460 ALANINE AMINO (ALT) (SGPT): CPT

## 2025-04-10 PROCEDURE — 84450 TRANSFERASE (AST) (SGOT): CPT

## 2025-04-10 PROCEDURE — 86355 B CELLS TOTAL COUNT: CPT

## 2025-04-10 PROCEDURE — 250N000011 HC RX IP 250 OP 636: Performed by: INTERNAL MEDICINE

## 2025-04-10 PROCEDURE — 85025 COMPLETE CBC W/AUTO DIFF WBC: CPT

## 2025-04-10 PROCEDURE — 82784 ASSAY IGA/IGD/IGG/IGM EACH: CPT

## 2025-04-10 PROCEDURE — 85652 RBC SED RATE AUTOMATED: CPT

## 2025-04-10 PROCEDURE — 82565 ASSAY OF CREATININE: CPT

## 2025-04-10 PROCEDURE — 82040 ASSAY OF SERUM ALBUMIN: CPT

## 2025-04-10 PROCEDURE — 81003 URINALYSIS AUTO W/O SCOPE: CPT

## 2025-04-10 PROCEDURE — 84156 ASSAY OF PROTEIN URINE: CPT

## 2025-04-10 PROCEDURE — 36415 COLL VENOUS BLD VENIPUNCTURE: CPT

## 2025-04-10 PROCEDURE — 86140 C-REACTIVE PROTEIN: CPT

## 2025-04-10 PROCEDURE — 250N000013 HC RX MED GY IP 250 OP 250 PS 637: Performed by: INTERNAL MEDICINE

## 2025-04-10 PROCEDURE — 258N000003 HC RX IP 258 OP 636: Performed by: INTERNAL MEDICINE

## 2025-04-10 RX ORDER — ALBUTEROL SULFATE 0.83 MG/ML
2.5 SOLUTION RESPIRATORY (INHALATION)
Status: CANCELLED | OUTPATIENT
Start: 2025-06-12

## 2025-04-10 RX ORDER — DIPHENHYDRAMINE HYDROCHLORIDE 50 MG/ML
25 INJECTION, SOLUTION INTRAMUSCULAR; INTRAVENOUS
Status: CANCELLED
Start: 2025-06-12

## 2025-04-10 RX ORDER — HYDROCORTISONE SODIUM SUCCINATE 100 MG/2ML
100 INJECTION INTRAMUSCULAR; INTRAVENOUS ONCE
Status: COMPLETED | OUTPATIENT
Start: 2025-04-10 | End: 2025-04-10

## 2025-04-10 RX ORDER — METHYLPREDNISOLONE SODIUM SUCCINATE 40 MG/ML
40 INJECTION INTRAMUSCULAR; INTRAVENOUS
Status: CANCELLED
Start: 2025-06-12

## 2025-04-10 RX ORDER — EPINEPHRINE 1 MG/ML
0.3 INJECTION, SOLUTION, CONCENTRATE INTRAVENOUS EVERY 5 MIN PRN
Status: CANCELLED | OUTPATIENT
Start: 2025-06-12

## 2025-04-10 RX ORDER — METHYLPREDNISOLONE SODIUM SUCCINATE 125 MG/2ML
81.25 INJECTION INTRAMUSCULAR; INTRAVENOUS ONCE
Status: COMPLETED | OUTPATIENT
Start: 2025-04-10 | End: 2025-04-10

## 2025-04-10 RX ORDER — METHYLPREDNISOLONE SODIUM SUCCINATE 125 MG/2ML
80 INJECTION INTRAMUSCULAR; INTRAVENOUS ONCE
Status: CANCELLED | OUTPATIENT
Start: 2025-06-12

## 2025-04-10 RX ORDER — MEPERIDINE HYDROCHLORIDE 25 MG/ML
25 INJECTION INTRAMUSCULAR; INTRAVENOUS; SUBCUTANEOUS
Status: CANCELLED | OUTPATIENT
Start: 2025-06-12

## 2025-04-10 RX ORDER — HEPARIN SODIUM,PORCINE 10 UNIT/ML
5-20 VIAL (ML) INTRAVENOUS DAILY PRN
Status: CANCELLED | OUTPATIENT
Start: 2025-06-12

## 2025-04-10 RX ORDER — ACETAMINOPHEN 325 MG/1
650 TABLET ORAL ONCE
Status: COMPLETED | OUTPATIENT
Start: 2025-04-10 | End: 2025-04-10

## 2025-04-10 RX ORDER — HEPARIN SODIUM (PORCINE) LOCK FLUSH IV SOLN 100 UNIT/ML 100 UNIT/ML
5 SOLUTION INTRAVENOUS
Status: CANCELLED | OUTPATIENT
Start: 2025-06-12

## 2025-04-10 RX ORDER — HYDROCORTISONE SODIUM SUCCINATE 100 MG/2ML
100 INJECTION INTRAMUSCULAR; INTRAVENOUS ONCE
Status: CANCELLED
Start: 2025-04-10 | End: 2025-04-10

## 2025-04-10 RX ORDER — DIPHENHYDRAMINE HYDROCHLORIDE 50 MG/ML
50 INJECTION, SOLUTION INTRAMUSCULAR; INTRAVENOUS
Status: CANCELLED
Start: 2025-06-12

## 2025-04-10 RX ORDER — ALBUTEROL SULFATE 90 UG/1
1-2 INHALANT RESPIRATORY (INHALATION)
Status: CANCELLED
Start: 2025-06-12

## 2025-04-10 RX ORDER — ACETAMINOPHEN 325 MG/1
650 TABLET ORAL ONCE
Status: CANCELLED
Start: 2025-06-12

## 2025-04-10 RX ADMIN — DIPHENHYDRAMINE HYDROCHLORIDE 50 MG: 50 INJECTION, SOLUTION INTRAMUSCULAR; INTRAVENOUS at 09:11

## 2025-04-10 RX ADMIN — ACETAMINOPHEN 650 MG: 325 TABLET, FILM COATED ORAL at 09:03

## 2025-04-10 RX ADMIN — METHYLPREDNISOLONE SODIUM SUCCINATE 81.25 MG: 125 INJECTION, POWDER, FOR SOLUTION INTRAMUSCULAR; INTRAVENOUS at 09:05

## 2025-04-10 RX ADMIN — SODIUM CHLORIDE 1000 MG: 9 INJECTION, SOLUTION INTRAVENOUS at 09:38

## 2025-04-10 RX ADMIN — HYDROCORTISONE SODIUM SUCCINATE 100 MG: 100 INJECTION, POWDER, FOR SOLUTION INTRAMUSCULAR; INTRAVENOUS at 13:20

## 2025-04-10 ASSESSMENT — PAIN SCALES - GENERAL: PAINLEVEL_OUTOF10: SEVERE PAIN (8)

## 2025-04-10 NOTE — PROGRESS NOTES
Infusion Nursing Note:  Janine Cornell presents today for 2/2 truxima.    Patient seen by provider today: No   present during visit today: Not Applicable.    Note: Patient is doing ok, her allergies have been acting up.  She is experiencing some sneezing and clear drainage at times.  Premeds given as prescribed.  Truxima started at 50/ml/hr for 30 minutes and increased by 50 every 30 minutes to max rate of 200ml/hr.  Hydrocortisone given midway through infusion to help with reactions.      Intravenous Access:  Labs drawn without difficulty.  Peripheral IV placed.    Treatment Conditions:  Biological Infusion Checklist:  ~~~ NOTE: If the patient answers yes to any of the questions below, hold the infusion and contact ordering provider or on-call provider.    Have you recently had an elevated temperature, fever, chills, productive cough, coughing for 3 weeks or longer or hemoptysis,  abnormal vital signs, night sweats,  chest pain or have you noticed a decrease in your appetite, unexplained weight loss or fatigue? Yes, allergy related, sneezing some clear drainage  Do you have any open wounds or new incisions? No  Do you have any upcoming hospitalizations or surgeries? Does not include esophagogastroduodenoscopy, colonoscopy, endoscopic retrograde cholangiopancreatography (ERCP), endoscopic ultrasound (EUS), dental procedures or joint aspiration/steroid injections No  Do you currently have any signs of illness or infection or are you on any antibiotics? No  Have you had any new, sudden or worsening abdominal pain? No  Have you or anyone in your household received a live vaccination in the past 4 weeks? Please note: No live vaccines while on biologic/chemotherapy until 6 months after the last treatment. Patient can receive the flu vaccine (shot only), pneumovax and the Covid vaccine. It is optimal for the patient to get these vaccines mid cycle, but they can be given at any time as long as it is not on the  day of the infusion. No  Have you recently been diagnosed with any new nervous system diseases (ie. Multiple sclerosis, Guillain [place], seizures, neurological changes) or cancer diagnosis? Are you on any form of radiation or chemotherapy? No  Are you pregnant or breast feeding or do you have plans of pregnancy in the future? No  Have you been having any signs of worsening depression or suicidal ideations?  (benlysta only) No  Have there been any other new onset medical symptoms? No  Have you had any new blood clots? (IVIG only) No      Post Infusion Assessment:  Patient tolerated infusion without incident.  Patient states her feet feel somewhat swollen, bu this in not uncommon.  Blood return noted pre and post infusion.  Access discontinued per protocol.  Biologic Infusion Post Education: Call the triage nurse at your clinic or seek medical attention if you have chills and/or temperature greater than or equal to 100.5, uncontrolled nausea/vomiting, diarrhea, constipation, dizziness, shortness of breath, chest pain, heart palpitations, weakness or any other new or concerning symptoms, questions or concerns.  You cannot have any live virus vaccines prior to or during treatment or up to 6 months post infusion.  If you have an upcoming surgery, medical procedure or dental procedure during treatment, this should be discussed with your ordering physician and your surgeon/dentist.  If you are having any concerning symptom, if you are unsure if you should get your next infusion or wish to speak to a provider before your next infusion, please call your care coordinator or triage nurse at your clinic to notify them so we can adequately serve you.       Discharge Plan:   Patient discharged in stable condition accompanied by: self.  Departure Mode: Ambulatory.  Therapy complete.    Nahed Yang RN

## 2025-04-12 LAB
MYELOPEROXIDASE AB SER IA-ACNC: 1 U/ML
MYELOPEROXIDASE AB SER IA-ACNC: NEGATIVE
PROTEINASE3 AB SER IA-ACNC: <1 U/ML
PROTEINASE3 AB SER IA-ACNC: NEGATIVE

## 2025-04-16 ENCOUNTER — TELEPHONE (OUTPATIENT)
Dept: INTERNAL MEDICINE | Facility: CLINIC | Age: 60
End: 2025-04-16

## 2025-04-16 NOTE — TELEPHONE ENCOUNTER
An RN from the GI clinic has already called the patient and left a voicemail. I ve been waiting for the patient to respond to the GI clinic, but she has not called back yet. I ve now left a detailed message for the patient regarding this matter and encouraged her to return their call.      Soon-Mi  --------------------------------------------------------------------------------------------          ----- Message from Kash Solano sent at 4/13/2025  1:17 PM CDT -----  Can you let her know  I rvwd pancreas issues w/ GI  What they'd like to do is  Get fresh imaging of pancreas for now and see her in clinic, then decide if special EUS endoscopy needed or not  ----- Message -----  From: Bryon Earl MD  Sent: 4/12/2025  11:00 AM CDT  To: Kash Solano MD; Benjamin Hyman PA-C    Sounds good, we'll set her up for MR and clinic visit and then can discuss about whether or not EUS is needed  ----- Message -----  From: Kash Solano MD  Sent: 4/10/2025   9:57 AM CDT  To: Bryon Earl MD; #    About a year ago she saw GI in clinic would it make sense to offer her GI follow-up office call to discuss?  ----- Message -----  From: Bryon Earl MD  Sent: 4/10/2025   8:42 AM CDT  To: Kash Solano MD; Benjamin Hyman PA-C    Its good to hear she feels well.  The main risk based on prior MR would be possibility of developing malignancy, but with no new sxs seems unlikely.  EUS is still good to evaluate but if she prefers non-invasive eval, another MR could be done to see if the area in the pancreatic head  previously noted has had any changes (it was shrinking on last look)  -[facility]  ----- Message -----  From: Kash Solano MD  Sent: 4/9/2025   4:50 PM CDT  To: Bryon Earl MD; #    Hi  I am her primary  I just saw her    You wanted to arrange EUS pancreas a year ago, never got done    I discussed it w/ her at length today; no new GI symptoms.     She is  now ok doing EUS.    I told her I'd let you know, and refer back (I just placed EUS referral), and that possibly since it has been a year you'd want to do something else first but I'd leave that to you.    Thanks  Horace

## 2025-04-17 DIAGNOSIS — R11.0 NAUSEA: ICD-10-CM

## 2025-04-17 DIAGNOSIS — M19.90 INFLAMMATORY ARTHRITIS: ICD-10-CM

## 2025-04-17 DIAGNOSIS — E78.5 HYPERLIPIDEMIA LDL GOAL <70: ICD-10-CM

## 2025-04-17 DIAGNOSIS — I25.10 CORONARY ARTERY DISEASE INVOLVING NATIVE HEART WITHOUT ANGINA PECTORIS, UNSPECIFIED VESSEL OR LESION TYPE: ICD-10-CM

## 2025-04-17 DIAGNOSIS — E11.9 TYPE 2 DIABETES, HBA1C GOAL < 7% (H): ICD-10-CM

## 2025-04-17 RX ORDER — PEN NEEDLE, DIABETIC 32GX 5/32"
NEEDLE, DISPOSABLE MISCELLANEOUS
Qty: 100 EACH | Refills: 2 | Status: SHIPPED | OUTPATIENT
Start: 2025-04-17

## 2025-04-17 RX ORDER — ONDANSETRON 8 MG/1
8 TABLET, ORALLY DISINTEGRATING ORAL EVERY 8 HOURS PRN
Qty: 20 TABLET | Refills: 1 | Status: SHIPPED | OUTPATIENT
Start: 2025-04-17

## 2025-04-17 NOTE — TELEPHONE ENCOUNTER
BD Pen needle Kamini 2nd gen 32g x 4mm miscellaneous - 1 pen needle daily or as directed    Odansetron 8 mg ODT tablet - 1 tablet PO every 8 hours PRN for nausea    Last Clinic Visit: 04/09/2025

## 2025-04-22 RX ORDER — NITROGLYCERIN 0.4 MG/1
TABLET SUBLINGUAL
Qty: 25 TABLET | Refills: 11 | Status: SHIPPED | OUTPATIENT
Start: 2025-04-22

## 2025-04-22 RX ORDER — PNV NO.95/FERROUS FUM/FOLIC AC 28MG-0.8MG
250 TABLET ORAL
Qty: 60 TABLET | Refills: 1 | Status: SHIPPED | OUTPATIENT
Start: 2025-04-22

## 2025-04-22 NOTE — TELEPHONE ENCOUNTER
MAGNESIUM OXIDE 250 MG TABLET   Last Written Prescription:  6/6/24  #60, 3 refills  ----------------------  Last Visit Date: 1/23/25  Future Visit Date: 7/2/25  -----------------  Refill decision: Medication unable to be refilled by RN due to: Medication not included in refill protocol policy     Request from pharmacy:  Requested Prescriptions   Pending Prescriptions Disp Refills    Magnesium Oxide 250 MG tablet [Pharmacy Med Name: MAGNESIUM OXIDE 250 MG TABLET] 60 tablet 1     Sig: TAKE 1 TABLET(250 MG) BY MOUTH TWICE DAILY       There is no refill protocol information for this order

## 2025-04-23 ENCOUNTER — TELEPHONE (OUTPATIENT)
Dept: MAMMOGRAPHY | Facility: CLINIC | Age: 60
End: 2025-04-23
Payer: COMMERCIAL

## 2025-04-23 NOTE — TELEPHONE ENCOUNTER
Left a message for Janine about rescheduling her breast biopsy.  Awaiting return phone call.  Call back number left was [phone].

## 2025-04-30 ENCOUNTER — TELEPHONE (OUTPATIENT)
Dept: INTERNAL MEDICINE | Facility: CLINIC | Age: 60
End: 2025-04-30

## 2025-04-30 NOTE — TELEPHONE ENCOUNTER
----- Message from Kash Solano sent at 4/16/2025  6:13 PM CDT -----  Can you let me know by next week if she gets breast biopsy set up? She knows about we spoke

## 2025-04-30 NOTE — TELEPHONE ENCOUNTER
Per chart review of TE's dated 4/11/2025 and 4/23/2025, Maritza Wilhelm RN called & LVM about scheduling/rescheduling breast biopsy.

## 2025-04-30 NOTE — TELEPHONE ENCOUNTER
Per pt calling back and understands that she has to schedule but she just been busy and hasn't had the chance to do schedule it yet. Per pt yes I will schedule this once I have time. Thank you.

## 2025-04-30 NOTE — TELEPHONE ENCOUNTER
The RN attempted to call the patient, but the RN was not able to reach the patient. The RN LVM with the [facility] phone number for the patient to call back.      If the patient calls back, please encourage the patient to call [phone] to schedule/reschedule the breast biopsy.

## 2025-05-06 ENCOUNTER — ANCILLARY PROCEDURE (OUTPATIENT)
Dept: MAMMOGRAPHY | Facility: CLINIC | Age: 60
End: 2025-05-06
Attending: FAMILY MEDICINE
Payer: COMMERCIAL

## 2025-05-06 DIAGNOSIS — R92.8 ABNORMAL MAMMOGRAM OF LEFT BREAST: ICD-10-CM

## 2025-05-06 PROCEDURE — 88305 TISSUE EXAM BY PATHOLOGIST: CPT | Mod: 26 | Performed by: PATHOLOGY

## 2025-05-06 PROCEDURE — 88305 TISSUE EXAM BY PATHOLOGIST: CPT | Mod: TC | Performed by: FAMILY MEDICINE

## 2025-05-06 PROCEDURE — 19083 BX BREAST 1ST LESION US IMAG: CPT | Mod: LT | Performed by: RADIOLOGY

## 2025-05-07 DIAGNOSIS — E11.9 TYPE 2 DIABETES, HBA1C GOAL < 8% (H): ICD-10-CM

## 2025-05-07 RX ORDER — METFORMIN HYDROCHLORIDE 500 MG/1
2000 TABLET, EXTENDED RELEASE ORAL
Qty: 360 TABLET | Refills: 1 | Status: SHIPPED | OUTPATIENT
Start: 2025-05-07

## 2025-05-08 ENCOUNTER — OFFICE VISIT (OUTPATIENT)
Dept: OPHTHALMOLOGY | Facility: CLINIC | Age: 60
End: 2025-05-08
Attending: FAMILY MEDICINE
Payer: COMMERCIAL

## 2025-05-08 ENCOUNTER — TELEPHONE (OUTPATIENT)
Dept: MAMMOGRAPHY | Facility: CLINIC | Age: 60
End: 2025-05-08
Payer: COMMERCIAL

## 2025-05-08 DIAGNOSIS — E11.9 TYPE 2 DIABETES, HBA1C GOAL < 7% (H): ICD-10-CM

## 2025-05-08 DIAGNOSIS — H47.20 OPTIC ATROPHY: Primary | ICD-10-CM

## 2025-05-08 PROCEDURE — 92133 CPTRZD OPH DX IMG PST SGM ON: CPT | Performed by: OPHTHALMOLOGY

## 2025-05-08 PROCEDURE — G0463 HOSPITAL OUTPT CLINIC VISIT: HCPCS | Performed by: OPHTHALMOLOGY

## 2025-05-08 ASSESSMENT — CONF VISUAL FIELD
OD_SUPERIOR_TEMPORAL_RESTRICTION: 0
OS_INFERIOR_NASAL_RESTRICTION: 0
OS_INFERIOR_TEMPORAL_RESTRICTION: 0
OS_SUPERIOR_TEMPORAL_RESTRICTION: 0
METHOD: COUNTING FINGERS
OS_SUPERIOR_NASAL_RESTRICTION: 0
OD_INFERIOR_NASAL_RESTRICTION: 0
OS_NORMAL: 1
OD_INFERIOR_TEMPORAL_RESTRICTION: 0
OD_SUPERIOR_NASAL_RESTRICTION: 0
OD_NORMAL: 1

## 2025-05-08 ASSESSMENT — REFRACTION_WEARINGRX
OD_AXIS: 165
OD_SPHERE: -3.75
OD_CYLINDER: +1.00
OS_SPHERE: -3.50
OS_CYLINDER: +1.00
OS_AXIS: 005

## 2025-05-08 ASSESSMENT — REFRACTION_MANIFEST
OS_CYLINDER: +1.00
OS_ADD: +2.50
OD_SPHERE: -3.75
OS_AXIS: 005
OD_AXIS: 165
OS_SPHERE: -3.50
OD_ADD: +2.50
OD_CYLINDER: +1.50

## 2025-05-08 ASSESSMENT — VISUAL ACUITY
METHOD: SNELLEN - LINEAR
OD_CC+: +1
OS_CC: 20/20
OD_PH_CC: 20/25
CORRECTION_TYPE: GLASSES
OD_PH_CC+: +1
OD_CC: 20/30

## 2025-05-08 ASSESSMENT — TONOMETRY
IOP_METHOD: ICARE
OD_IOP_MMHG: 20
OS_IOP_MMHG: 21

## 2025-05-08 ASSESSMENT — EXTERNAL EXAM - RIGHT EYE: OD_EXAM: NORMAL

## 2025-05-08 ASSESSMENT — SLIT LAMP EXAM - LIDS
COMMENTS: NORMAL
COMMENTS: NORMAL

## 2025-05-08 ASSESSMENT — CUP TO DISC RATIO
OS_RATIO: 0.2
OD_RATIO: 0.1

## 2025-05-08 ASSESSMENT — EXTERNAL EXAM - LEFT EYE: OS_EXAM: NORMAL

## 2025-05-08 NOTE — PROGRESS NOTES
Janine Cornell is a 59 year old female with the following diagnoses:   1. Optic atrophy    2. 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  Last visit was February 2024.  At that time, there was no evidence of Background diabetic retinopathy.  Today, patient reports last HbA1c was 7.4. She has some pressure sensation in her RIGHT eye. She also notes itching of her eyes.      Exam:  Visual acuity 20/30 right eye 20/20 left eye.  Color vision is normal both eyes.  Pupils: sluggish without afferent pupillary defect.  Intraocular pressure 20 right eye and 21 left eye.      Tests ordered and interpreted today:    Optical coherence tomography       It is my impression that patient has Diabetes mellitus.  There is no evidence of Background diabetic retinopathy or orbital findings. She has optic atrophy RIGHT eye.  The retinal nerve fiber layer is stable and her visual acuity is stable.  A glasses prescription was provided at the patient's request.      Would recommend artificial tear drops on a regular basis.           Attending Physician Attestation:  Complete documentation of historical and exam elements from today's encounter can be found in the full encounter summary report (not reduplicated in this progress note).  I personally obtained the chief complaint(s) and history of present illness.  I confirmed and edited as necessary the review of systems, past medical/surgical history, family history, social history, and examination findings as documented by others; and I examined the patient myself.  I personally reviewed the relevant tests, images, and reports as documented above.  I formulated and edited as necessary the assessment and plan and discussed the findings and management plan with the patient and family. - Jas Vernon MD

## 2025-05-08 NOTE — TELEPHONE ENCOUNTER
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  We discussed the Radiologist's recommendation of continuing on with her yearly screening mammograms.  Janine verbalized understanding of the plan.

## 2025-05-10 ENCOUNTER — RESULTS FOLLOW-UP (OUTPATIENT)
Dept: RHEUMATOLOGY | Facility: CLINIC | Age: 60
End: 2025-05-10
Payer: COMMERCIAL

## 2025-05-12 ENCOUNTER — TELEPHONE (OUTPATIENT)
Dept: RHEUMATOLOGY | Facility: CLINIC | Age: 60
End: 2025-05-12
Payer: COMMERCIAL

## 2025-05-23 ENCOUNTER — OFFICE VISIT (OUTPATIENT)
Dept: RHEUMATOLOGY | Facility: CLINIC | Age: 60
End: 2025-05-23
Attending: INTERNAL MEDICINE
Payer: COMMERCIAL

## 2025-05-23 VITALS
HEART RATE: 76 BPM | DIASTOLIC BLOOD PRESSURE: 67 MMHG | BODY MASS INDEX: 28.24 KG/M2 | WEIGHT: 157 LBS | SYSTOLIC BLOOD PRESSURE: 144 MMHG

## 2025-05-23 DIAGNOSIS — M25.562 PAIN IN BOTH KNEES, UNSPECIFIED CHRONICITY: ICD-10-CM

## 2025-05-23 DIAGNOSIS — M25.561 PAIN IN BOTH KNEES, UNSPECIFIED CHRONICITY: ICD-10-CM

## 2025-05-23 DIAGNOSIS — Z51.81 MEDICATION MONITORING ENCOUNTER: Primary | ICD-10-CM

## 2025-05-23 PROCEDURE — 99215 OFFICE O/P EST HI 40 MIN: CPT | Performed by: INTERNAL MEDICINE

## 2025-05-23 PROCEDURE — G0463 HOSPITAL OUTPT CLINIC VISIT: HCPCS | Performed by: INTERNAL MEDICINE

## 2025-05-23 PROCEDURE — G2211 COMPLEX E/M VISIT ADD ON: HCPCS | Performed by: INTERNAL MEDICINE

## 2025-05-23 PROCEDURE — 1125F AMNT PAIN NOTED PAIN PRSNT: CPT | Performed by: INTERNAL MEDICINE

## 2025-05-23 PROCEDURE — 3078F DIAST BP <80 MM HG: CPT | Performed by: INTERNAL MEDICINE

## 2025-05-23 PROCEDURE — 3077F SYST BP >= 140 MM HG: CPT | Performed by: INTERNAL MEDICINE

## 2025-05-23 ASSESSMENT — PAIN SCALES - GENERAL: PAINLEVEL_OUTOF10: MODERATE PAIN (6)

## 2025-05-23 NOTE — PATIENT INSTRUCTIONS
Rituximab 1 gram every 2 weeks x 2 in early September    Labs in July 2025 then every 3 months    Try ketoprofen 5% cream, 2-4 times a day over your joints    Return in 6 months in person

## 2025-05-23 NOTE — NURSING NOTE
"Chief Complaint   Patient presents with    RECHECK       Vital signs:      BP: (!) 144/67 Pulse: 76             Weight: 71.2 kg (157 lb)  Estimated body mass index is 28.24 kg/m  as calculated from the following:    Height as of 4/9/25: 1.588 m (5' 2.52\").    Weight as of this encounter: 71.2 kg (157 lb).      Beulah Fortune CMA   5/23/2025 3:52 PM    "

## 2025-05-23 NOTE — LETTER
5/23/2025       RE: Janine Cornell  [address]  [facility] [address]     Dear Colleague,    Thank you for referring your patient, Janine Cornell, to the [facility]HE[facility] [place] [facility]. Please see a copy of my visit note below.    Rheumatology F/U Visit Note    Last visit note: 1/23/2025    Today's visit date: 5/23/2025    Reason for in person visit: F/U GPA, high risk med use    HPI     Ms. 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 She is s/p tx with HCQ since 5/2014, now off due to abnormal eye exam. On rituximab since 12/20218 with great response. Previously tried and did not tolerate MTX, AZA.    She had lateral orbitotomy and debulking orbital mass right eye on 9/26/18 with Dr. Shah and Dr. Valdez at [facility]. 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 There were no complications according to the op note.      Today 5/23/2025:      Legs hurt swell up on walking, timur when it is hot outside, balance is off    Had R eye swelling x past 6 wk, better now, had follow up with Dr. Vernon, no concern for GPA flare    Leg pain is uncomfortable    Takes tramadol rarely    Tens unit did not work    Tried sound tx    No access to pool tx or acupuncture    Has neuropathy pain in legs, feet, decided not to stop taking lyrica    Rituximab helps with joint pain, benefit lasts 4-5 months        1/23/2025:    Continues to have pain over joints with neuropathy, lyrica did not help, likes to stop it. Has Rx for TENS unit, will use it     10/11/2024:    -lots of pain affecting feet, legs    -has not left house since June, bottom of feet swell up    -hair is coming out    -rituximab does not help with pain    -has swelling inside mouth    -the other day, R side of face swelled up    -after last rituxiamb, had candidia infection under  breast, BG was down    -had sweating fater rituximab    -lyrica is not helping    9/6, 9/26 rituximab 1 gram    6/5/2024:    -reports arm weakness    -has back pain    -continues to have joint pain    -has more headaches, R eye pain    -neuropathy is an issue     1/31/2024:      Nortriptyline was prescribed by another provider, but has not tried it yet    R side of her face started swelling again.    Reports pain over neck, hips, knees and hands.          9/6/2023:    -reports pain/swelling over hands/feet, has more arthritis problem over past year  -gets numbness over toes, bottom of feet, can't see neurology till 1/2024  -walking causes pain in the feet  -gets pain over hips after a trip to [facility], it is a new problem  -sometimes gets swelling over R cheek, noticed it yesterday outside with heat, her face was also bright red        3/9/2023: Reports pain/swelling of her hands. S/p last rituximab 1 gram on 9/7/2022.       1/25/2023: Janine is doing a phone visit for follow up, was feeling ill and switched in person to phone visit. She got sick around Thanksgiving, saw her PCP on 12/17, was tested positive for COVID, it took a longtime to feel better, did not take paxlovid as it was already past 2 weeks. Since then, she has not been feeling well. Has headaches, body ache, her eyes especially R eye look pink, they burn and itch a lot. Has sense of smell but can not taste her food. She is very tired, hardly leaves her house. Last time, left house for doctor visit, was tired and sore. Has tingling and pinprick feeling over arms and legs. Has upcoming follow up with PCP on 2/10. She had thyroid US for thyroid nodules. She had abnormal screening mammogram on 1/9, will go back for diagnostic mammogram and US of L breast on 1/31.          08/19/2022  Reports fatigue and headaches. Headache worsens after taking Zofran for nausea. Joint symptoms come and go. Last rituximab on 4/6/22. Rituximab is helpful but feels like it  wears off prior to the 6 months. Next appointment scheduled for October 2022. Develops intermittent vaginal yeast infection and thrush related to rising blood glucose. Right eye without symptoms. No new fevers, chills, skin changes. Son is wondering if she takes herbal supplements for headaches will this interact with any of her medications. Scheduled to meet with pharmacist today.        4/22/2022:   Each rituximab infusion causes vaginal yeast infection and thrush related to rise in BG. Her BG has stayed in higher level since last rituximab. Has more ache and pain, myalgia and arthralgia. Recently has had headaches with high BP. Had last rituximab 1000 mg on 4/6.Her R eye is itching and swelling up.  Today, her BG is 177.Has gained 20 lbs since Feb 2022. Had severe pain in her arm after covid vaccine, it took a month to get better.      12/16/2021: Janine presents for follow up. Reports ESOB with cold, has ongoing joint pain. R eye pain is intermittent.  Reports headaches after rituximab 1 gram on 10/18/2021.  Last week, her R knee was hurting.      8/20/2021: Janine has pain over arms, knees. Some days, feel stiff all day long. Some days can't do stairs. Hard to tell if there is swelling as has more water retention during summer.  Some days, eye swells up like today. No fevers, SOB, CP or cough.  Has rosacea but some days wakes up with heat rash over sun. Her face is peeling.  Sometimes can't lift things or grab things with pain from elbow to hands. Has shoulder and neck pain but this forearm pain is new.  Stopped HCQ in mid July due to concern for potential HCQ toxicity on OCT, her eye exam with Dr. Vernon has been re-scheduled and upcoming on 9/14 to address HCQ toxicity, pain is not worse off HCQ.  Not COVID vaccinated but is cautious.      5/10/2021: Joint ache, knees hurt, R elbow hurts, R arm hurts, R hand hurts. Has lots of swelling in her joints especially hands.    Depending on weather, joints jhurt, sometimes  pain is 7/10.  Has problem with taking stairs and balance.  Gets off balance.   R eye gets tinglin, swelling.  Gets out of breath, gets worse with seasonal allergies.  Over past month and half, took nitro more, like 8 times.  No hemooptysis, no hematuria.  Has allergies.      4/21/2021: Had last rituximab 1 gram on 1/19, 2/2/2021. Reports rituximab starts to wear off earlier, has more sx this time. Eye swelling is intermittent. Gets indigestion/ GERD sx with constipation after the infusion.  She reports having asthma, swelling of neck, arms. She thinks that it could be from her vasculitis. She is worried about going down on rituximab dose.   Otherwise unchanged sx, no SOB or hemoptysis or persistent cough, or   Somedays her knees and hips hurt a lot, feel like bone on bone in her knees, especially on changing position.      Component      Latest Ref Rng 2/28/2013 2/28/2013          12:14 PM 12:14 PM   WBC      4.0 - 11.0 10e9/L     RBC Count      3.8 - 5.2 10e12/L     Hemoglobin      11.7 - 15.7 g/dL     Hematocrit      35.0 - 47.0 %     MCV      78 - 100 fl     MCH      26.5 - 33.0 pg     MCHC      31.5 - 36.5 g/dL     RDW      10.0 - 15.0 %     Platelet Count      150 - 450 10e9/L     Specimen Description           Lyme Screen IgG and IgM           Vitamin D Deficiency screening      30 - 75 ug/L     Ferritin      10 - 300 ng/mL     Sed Rate      0 - 20 mm/h     ALLI Screen by EIA      <1.0     Rheumatoid Factor      0 - 14 IU/mL     CK Total      30 - 225 U/L  78   Uric Acid      2.5 - 7.5 mg/dL 6.7      Component      Latest Ref Rng 2/28/2013          12:14 PM   WBC      4.0 - 11.0 10e9/L    RBC Count      3.8 - 5.2 10e12/L    Hemoglobin      11.7 - 15.7 g/dL    Hematocrit      35.0 - 47.0 %    MCV      78 - 100 fl    MCH      26.5 - 33.0 pg    MCHC      31.5 - 36.5 g/dL    RDW      10.0 - 15.0 %    Platelet Count      150 - 450 10e9/L    Specimen Description       Serum   Lyme Screen IgG and IgM       Test  value: <0.75....Interpretation: Negative....If you highly suspect Lyme . . .   Vitamin D Deficiency screening      30 - 75 ug/L    Ferritin      10 - 300 ng/mL    Sed Rate      0 - 20 mm/h    ALLI Screen by EIA      <1.0    Rheumatoid Factor      0 - 14 IU/mL    CK Total      30 - 225 U/L    Uric Acid      2.5 - 7.5 mg/dL      Component      Latest Ref Rng 2/28/2013 2/28/2013 2/28/2013 2/28/2013          12:14 PM 12:14 PM 12:14 PM 12:14 PM   WBC      4.0 - 11.0 10e9/L       RBC Count      3.8 - 5.2 10e12/L       Hemoglobin      11.7 - 15.7 g/dL       Hematocrit      35.0 - 47.0 %       MCV      78 - 100 fl       MCH      26.5 - 33.0 pg       MCHC      31.5 - 36.5 g/dL       RDW      10.0 - 15.0 %       Platelet Count      150 - 450 10e9/L       Specimen Description             Lyme Screen IgG and IgM             Vitamin D Deficiency screening      30 - 75 ug/L       Ferritin      10 - 300 ng/mL    10   Sed Rate      0 - 20 mm/h   23 (H)    ALLI Screen by EIA      <1.0  <1.0 . . .     Rheumatoid Factor      0 - 14 IU/mL 26 (H)      CK Total      30 - 225 U/L       Uric Acid      2.5 - 7.5 mg/dL         5/2013  CBC WITH PLATELETS DIFFERENTIAL       Component Value Range    WBC 11.3 (*) 4.0 - 11.0 10e9/L    RBC Count 4.56  3.8 - 5.2 10e12/L    Hemoglobin 11.1 (*) 11.7 - 15.7 g/dL    Hematocrit 34.3 (*) 35.0 - 47.0 %    MCV 75 (*) 78 - 100 fl    MCH 24.3 (*) 26.5 - 33.0 pg    MCHC 32.4  31.5 - 36.5 g/dL    RDW 16.1 (*) 10.0 - 15.0 %    Platelet Count 315  150 - 450 10e9/L    Diff Method Automated Method      % Neutrophils 71.6  40 - 75 %    % Lymphocytes 20.9  20 - 48 %    % Monocytes 4.3  0 - 12 %    % Eosinophils 2.8  0 - 6 %    % Basophils 0.2  0 - 2 %    % Immature Granulocytes 0.2  0 - 0.4 %    Absolute Neutrophil 8.1  1.6 - 8.3 10e9/L    Absolute Lymphocytes 2.4  0.8 - 5.3 10e9/L    Absolute Monoctyes 0.5  0.0 - 1.3 10e9/L    Absolute Eosinophils 0.3  0.0 - 0.7 10e9/L    Absolute Basophils 0.0  0.0 - 0.2 10e9/L     Abs Immature Granulocytes 0.0  0 - 0.03 10e9/L   AMYLASE       Component Value Range    Amylase 103  30 - 110 U/L   COMPREHENSIVE METABOLIC PANEL       Component Value Range    Sodium 144  133 - 144 mmol/L    Potassium 3.8  3.4 - 5.3 mmol/L    Chloride 105  94 - 109 mmol/L    Carbon Dioxide 23  20 - 32 mmol/L    Anion Gap 17  6 - 17 mmol/L    Glucose 173 (*) 60 - 99 mg/dL    Urea Nitrogen 13  5 - 24 mg/dL    Creatinine 0.83  0.52 - 1.04 mg/dL    GFR Estimate 74  >60 mL/min/1.7m2    GFR Estimate If Black 90  >60 mL/min/1.7m2    Calcium 9.7  8.5 - 10.4 mg/dL    Bilirubin Total 0.4  0.2 - 1.3 mg/dL    Albumin 4.3  3.9 - 5.1 g/dL    Protein Total 7.8  6.8 - 8.8 g/dL    Alkaline Phosphatase 66  40 - 150 U/L    ALT 36  0 - 50 U/L    AST 28  0 - 45 U/L   CK TOTAL       Component Value Range    CK Total 66  30 - 225 U/L   CRP INFLAMMATION       Component Value Range    CRP Inflammation 10.4 (*) 0.0 - 8.0 mg/L   LIPASE       Component Value Range    Lipase 353 (*) 20 - 250 U/L   ERYTHROCYTE SEDIMENTATION RATE AUTO       Component Value Range    Sed Rate 26 (*) 0 - 20 mm/h   ROUTINE UA WITH MICROSCOPIC REFLEX TO CULTURE       Component Value Range    Color Urine Yellow      Appearance Urine Slightly Cloudy      Glucose Urine 30 (*) NEG mg/dL    Bilirubin Urine Negative  NEG    Ketones Urine 5 (*) NEG mg/dL    Specific Gravity Urine 1.026  1.003 - 1.035    Blood Urine Trace (*) NEG    pH Urine 5.0  5.0 - 7.0 pH    Protein Albumin Urine 10 (*) NEG mg/dL    Urobilinogen mg/dL Normal  0.0 - 2.0 mg/dL    Nitrite Urine Negative  NEG    Leukocyte Esterase Urine Negative  NEG    Source Midstream Urine      WBC Urine 1  0 - 2 /HPF    RBC Urine 4 (*) 0 - 2 /HPF    Squamous Epithelial /HPF Urine <1  0 - 1 /HPF    Mucous Urine Present (*) NEG /LPF    Hyaline Casts 3 (*) 0 - 2 /LPF    Calcium Oxalate Moderate (*) NEG /HPF   COMPLEMENT C3       Component Value Range    Complement C3 143  76 - 169 mg/dL   COMPLEMENT C4       Component Value  Range    Complement C4 31  15 - 50 mg/dL   HEPATITIS C ANTIBODY       Component Value Range    Hepatitis C Antibody Negative  NEG   CARDIOLIPIN ANTIBODY IGG AND IGM       Component Value Range    Cardiolipin IgG Marline    0 - 15.0 GPL    Value: <15.0      Interpretation:  Negative    Cardiolipin IgM Marline    0 - 12.5 MPL    Value: <12.5      Interpretation:  Negative   LUPUS PANEL       Component Value Range    Lupus Result    NEG    Value: Negative      (Note)      COMMENTS:      The INR is normal.      APTT is normal.  1:2 Mix is not indicated.      DRVVT Screen is normal.      Thrombin time is normal.      NEGATIVE TEST; A LUPUS ANTICOAGULANT WAS NOT DETECTED IN THIS      SPECIMEN WITHIN THE LIMITS OF THE TESTING REPERTOIRE.      If the clinical picture is strongly suggestive of an antiphospholipid      syndrome, recommend anticardiolipin and beta-2-glycoprotein (IgG and      IgM) antibody tests.      Leela Franks M.D.  [phone]      5/2/2013            INR =  0.93 N = 0.86-1.14  (No additional charge)      TT = 15.7 N = 13.0-19.0 sec  (No additional charge)            APTT'S:    Seconds      Reagent =  Stago LA      Normal  =  38      Patient  =  34      1:2 Mix  =  N/A      Reference =  31-43             DILUTE MADELINE VIPER VENOM TEST:      DRVVT Screen Ratio = 0.76 Normal = <1.21         IMMUNOGLOBULIN G SUBCLASSES       Component Value Range    IGG 1030  695 - 1620 mg/dL    IgG1 488  300 - 856 mg/dL    IgG2 325  158 - 761 mg/dL    IgG3 47  24 - 192 mg/dL    IgG4 18  11 - 86 mg/dL   SUNNY ANTIBODY PANEL       Component Value Range    Ribonucleic Protein IgG Antibody 0      Smith Antibody IgG 1      SSA (RO) Antibody IgG 4      SSB (LA) Antibody IgG 0      Scleroderma Antibody IgG 0     BETA 2 GLYCOPROTEIN ANTIBODIES IGG IGM       Component Value Range    Beta-2-Glycopro IgG 1      Beta-2-Glycopro IgM 5     CYCLIC CIT PEPT IGG       Component Value Range    Cyclic Cit Pept IgG/IgA    <20 UNITS    Value:  <20      Interpretation:  Negative   DNA DOUBLE STRANDED ANTIBODIES       Component Value Range    DNA-ds    0 - 29 IU/mL    Value: <15      Interpretation:  Negative       CT CHEST PULMONARY EMBOLISM W CONTRAST 5/20/2015 4:57 PM  HISTORY: Pain. SOB. Elevated d-dimer.   TECHNIQUE: 65 mL Isovue 370. Axial images with coronal  reconstructions.  COMPARISON: None.  FINDINGS: Calcified granuloma with surrounding scarring in the  posterolateral left upper lobe. Triangular shaped opacity at the right  lung base in the lateral right middle lobe could represent some  scarring, atelectasis or infiltrate. There is also some scarring or  atelectasis in the posteromedial right lung base. Lungs otherwise  clear.  The pulmonary arteries are well opacified. No CT evidence for acute  pulmonary embolus. No aortic dissection.  Diffuse fatty infiltration of the liver.  IMPRESSION  IMPRESSION:   1. No pulmonary embolus identified.  2. Small focus of atelectasis, infiltrate or scarring in the lateral  right middle lobe.  3. Old granulomatous disease.  4. Otherwise negative.  SILVERIO VAZQUEZ MD    Copath Report      Patient Name: FAVIO MARTINEZ   MR#: [id number]   Specimen #: [id number]   Collected: 3/15/2016   Received: 3/15/2016   Reported: 3/17/2016 13:33   Ordering Phy(s): PARVEEN ENRIQUEZ     ORIGINAL REPORT FOLLOWS ADDENDUM  ADDENDUM     TO ORIGINAL REPORT   Status: Signed Out   Date Ordered:3/18/2016   Date Complete:3/18/2016   Date Reported:3/18/2016 12:06   Signed Out By: Marianne Godfrey MD     INTERPRETATION:   This addendum is done to incorporate the results of fungal (GMS) stains   done on the specimen.  Specimen is negative for fungal organisms.  The   original final diagnosis remains unchanged.     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  Negative for malignancy(Please   see microscopic description)     Electronically signed out by:     Marianne Godfrey MD     CLINICAL HISTORY:   right orbital mass     GROSS:   The specimen is received labeled \"right orbital biopsy\" and consists of   red-tan nodule measuring 1.5 x 0.9 x 0.6 cm with one smooth side and   opposite rough side consistent with periosteum.  The specimen is   bisected revealing homogenous pale tan fleshy cut surface.  Touch   preparations are prepared, air dried and fixed, portion of specimen is   submitted in RPMI for possible lymphoma workup [facility],   ([facility], [place], [place] ).  The remainder is entirely submitted.   (Dictated by: Marianne Godfrey MD 3/15/2016 04:45 PM)     MICROSCOPIC:     Specimen consists of portion of lacrimal gland with acute and chronic   inflammation.  Focal area of microabscess formation associated with   small areas of necrosis are also present.  Inflammation is seen   extending to the periorbital adipose tissue forming panniculitis.   Specimen is negative for malignancy.  Samples sent for immunophenotyping   to [facility], ([email or web address], [place], [place] ) reveals no evidence   of monoclonality or aberrant antigen expression.  A GMS (fungal) stain   is pending and results will be reported as an addendum.     CPT Codes:   A: 70154-OU8, 13240-ZQMXJ, SOH, 53565-MQJHX, 44917-BPVC     TESTING LAB LOCATION:   [address]  [id number] [phone]     COLLECTION SITE:   [facility]   [facility]            Component      Latest Ref Rng 4/29/2016   Nucleated RBCs      0 /100 0   Absolute Neutrophil      1.6 - 8.3 10e9/L 8.9 (H)   Absolute Lymphocytes      0.8 - 5.3 10e9/L 3.2   Absolute Monocytes      0.0 - 1.3 10e9/L 0.8   Absolute Eosinophils      0.0 - 0.7 10e9/L 0.2   Absolute Basophils      0.0 - 0.2 10e9/L 0.1   Abs " Immature Granulocytes      0 - 0.4 10e9/L 0.1   Absolute Nucleated RBC       0.0   IGG      695 - 1620 mg/dL 836   IgG1      300 - 856 mg/dL 280 (L)   IgG2      158 - 761 mg/dL 277   IgG3      24 - 192 mg/dL 35   IgG4      11 - 86 mg/dL 16   RNP Antibody IgG      0.0 - 0.9 AI <0.2 . . .   Smith SUNNY Antibody IgG      0.0 - 0.9 AI <0.2 . . .   SSA (Ro) (SUNNY) Antibody, IgG      0.0 - 0.9 AI <0.2 . . .   SSB (La) (SUNNY) Antibody, IgG      0.0 - 0.9 AI <0.2 . . .   Scleroderma Antibody Scl-70 SUNNY IgG      0.0 - 0.9 AI <0.2 . . .   Cholesterol      <200 mg/dL 154   Triglycerides      <150 mg/dL 220 (H)   HDL Cholesterol      >49 mg/dL 42 (L)   LDL Cholesterol Calculated      <100 mg/dL 67   Non HDL Cholesterol      <130 mg/dL 111   M Tuberculosis Result      NEG Negative   M Tuberculosis Antigen Value       0.00   Albumin      3.4 - 5.0 g/dL 4.3   ALT      0 - 50 U/L 30   AST      0 - 45 U/L 10   Complement C3      76 - 169 mg/dL 157   Complement C4      15 - 50 mg/dL 32   CRP Inflammation      0.0 - 8.0 mg/L <2.9   Sed Rate      0 - 20 mm/h 2   DNA-ds      <10 IU/mL 1   Cyclic Citrullinated Peptide Antibody, IgG      <7 U/mL 1   Rheumatoid Factor      <20 IU/mL <20   Proteinase 3 Antibody IgG      0.0 - 0.9 AI <0.2 . . .   Myeloperoxidase Antibody IgG      0.0 - 0.9 AI 2.5 (H)   Vitamin D Deficiency screening      20 - 75 ug/L 24   Hemoglobin A1C      4.3 - 6.0 % 7.9 (H)   ALLI by IFA IgG       1:40 (A) . . .     Component      Latest Ref Rng & Units 1/16/2021   WBC      4.0 - 11.0 10e9/L    RBC Count      3.8 - 5.2 10e12/L    Hemoglobin      11.7 - 15.7 g/dL    Hematocrit      35.0 - 47.0 %    MCV      78 - 100 fl    MCH      26.5 - 33.0 pg    MCHC      31.5 - 36.5 g/dL    RDW      10.0 - 15.0 %    Platelet Count      150 - 450 10e9/L    Diff Method          % Neutrophils      %    % Lymphocytes      %    % Monocytes      %    % Eosinophils      %    % Basophils      %    % Immature Granulocytes      %    Nucleated RBCs       0 /100    Absolute Neutrophil      1.6 - 8.3 10e9/L    Absolute Lymphocytes      0.8 - 5.3 10e9/L    Absolute Monocytes      0.0 - 1.3 10e9/L    Absolute Eosinophils      0.0 - 0.7 10e9/L    Absolute Basophils      0.0 - 0.2 10e9/L    Abs Immature Granulocytes      0 - 0.4 10e9/L    Absolute Nucleated RBC          IGG      610 - 1,616 mg/dL    IGA      84 - 499 mg/dL    IGM      35 - 242 mg/dL    Creatinine      0.52 - 1.04 mg/dL    GFR Estimate      >60 mL/min/1.73:m2    GFR Estimate If Black      >60 mL/min/1.73:m2    COVID-19 Virus PCR to [facility] - Source       Nasopharyngeal   COVID-19 Virus PCR to [facility] - Result       Not Detected   Neutrophil Cytoplasmic Antibody      <1:10 titer    Neutrophil Cytoplasmic Antibody Pattern          CD19 B Cells      6 - 27 %    Absolute CD19      107 - 698 cells/uL    Myeloperoxidase Antibody IgG      0.0 - 0.9 AI    Proteinase 3 Antibody IgG      0.0 - 0.9 AI    Vitamin D Deficiency screening      20 - 75 ug/L    Sed Rate      0 - 30 mm/h    CRP Inflammation      0.0 - 8.0 mg/L    Albumin      3.4 - 5.0 g/dL    ALT      0 - 50 U/L    AST      0 - 45 U/L      Component      Latest Ref Rng & Units 5/7/2021   WBC      4.0 - 11.0 10e9/L 8.9   RBC Count      3.8 - 5.2 10e12/L 4.89   Hemoglobin      11.7 - 15.7 g/dL 12.7   Hematocrit      35.0 - 47.0 % 39.7   MCV      78 - 100 fl 81   MCH      26.5 - 33.0 pg 26.0 (L)   MCHC      31.5 - 36.5 g/dL 32.0   RDW      10.0 - 15.0 % 13.7   Platelet Count      150 - 450 10e9/L 336   Diff Method       Automated Method   % Neutrophils      % 65.3   % Lymphocytes      % 20.7   % Monocytes      % 7.8   % Eosinophils      % 5.3   % Basophils      % 0.7   % Immature Granulocytes      % 0.2   Nucleated RBCs      0 /100 0   Absolute Neutrophil      1.6 - 8.3 10e9/L 5.8   Absolute Lymphocytes      0.8 - 5.3 10e9/L 1.8   Absolute Monocytes      0.0 - 1.3 10e9/L 0.7   Absolute Eosinophils      0.0 - 0.7 10e9/L 0.5   Absolute Basophils      0.0 -  0.2 10e9/L 0.1   Abs Immature Granulocytes      0 - 0.4 10e9/L 0.0   Absolute Nucleated RBC       0.0   IGG      610 - 1,616 mg/dL 649   IGA      84 - 499 mg/dL 199   IGM      35 - 242 mg/dL 36   Creatinine      0.52 - 1.04 mg/dL 0.96   GFR Estimate      >60 mL/min/1.73:m2 66   GFR Estimate If Black      >60 mL/min/1.73:m2 77   COVID-19 Virus PCR to [facility] - Source          COVID-19 Virus PCR to [facility] - Result          Neutrophil Cytoplasmic Antibody      <1:10 titer <1:10   Neutrophil Cytoplasmic Antibody Pattern       The ANCA IFA is <1:10.  No further testing will be performed.   CD19 B Cells      6 - 27 % <1 (L)   Absolute CD19      107 - 698 cells/uL <1 (L)   Myeloperoxidase Antibody IgG      0.0 - 0.9 AI 1.1 (H)   Proteinase 3 Antibody IgG      0.0 - 0.9 AI <0.2   Vitamin D Deficiency screening      20 - 75 ug/L 41   Sed Rate      0 - 30 mm/h 9   CRP Inflammation      0.0 - 8.0 mg/L <2.9   Albumin      3.4 - 5.0 g/dL 4.1   ALT      0 - 50 U/L 28   AST      0 - 45 U/L 18     Lab on 07/08/2022   Component Date Value Ref Range Status     Sodium 07/08/2022 143  133 - 144 mmol/L Final     Potassium 07/08/2022 3.9  3.4 - 5.3 mmol/L Final     Chloride 07/08/2022 108  94 - 109 mmol/L Final     Carbon Dioxide (CO2) 07/08/2022 25  20 - 32 mmol/L Final     Anion Gap 07/08/2022 10  3 - 14 mmol/L Final     Urea Nitrogen 07/08/2022 17  7 - 30 mg/dL Final     Creatinine 07/08/2022 1.01  0.52 - 1.04 mg/dL Final     Calcium 07/08/2022 9.3  8.5 - 10.1 mg/dL Final     Glucose 07/08/2022 103 (A) 70 - 99 mg/dL Final     GFR Estimate 07/08/2022 65  >60 mL/min/1.73m2 Final    Effective December 21, 2021 eGFRcr in adults is calculated using the 2021 CKD-EPI creatinine equation which includes age and gender ( et al., NEJ, DOI: 10.1056/ZAXCvp8588614)     WBC Count 07/08/2022 12.0 (A) 4.0 - 11.0 10e3/uL Final     RBC Count 07/08/2022 4.94  3.80 - 5.20 10e6/uL Final     Hemoglobin 07/08/2022 12.6  11.7 - 15.7 g/dL Final      Hematocrit 07/08/2022 39.6  35.0 - 47.0 % Final     MCV 07/08/2022 80  78 - 100 fL Final     MCH 07/08/2022 25.5 (A) 26.5 - 33.0 pg Final     MCHC 07/08/2022 31.8  31.5 - 36.5 g/dL Final     RDW 07/08/2022 13.6  10.0 - 15.0 % Final     Platelet Count 07/08/2022 350  150 - 450 10e3/uL Final     % Neutrophils 07/08/2022 66  % Final     % Lymphocytes 07/08/2022 22  % Final     % Monocytes 07/08/2022 9  % Final     % Eosinophils 07/08/2022 3  % Final     % Basophils 07/08/2022 0  % Final     % Immature Granulocytes 07/08/2022 0  % Final     NRBCs per 100 WBC 07/08/2022 0  <1 /100 Final     Absolute Neutrophils 07/08/2022 7.9  1.6 - 8.3 10e3/uL Final     Absolute Lymphocytes 07/08/2022 2.7  0.8 - 5.3 10e3/uL Final     Absolute Monocytes 07/08/2022 1.1  0.0 - 1.3 10e3/uL Final     Absolute Eosinophils 07/08/2022 0.3  0.0 - 0.7 10e3/uL Final     Absolute Basophils 07/08/2022 0.1  0.0 - 0.2 10e3/uL Final     Absolute Immature Granulocytes 07/08/2022 0.0  <=0.4 10e3/uL Final     Absolute NRBCs 07/08/2022 0.0  10e3/uL Final     Component      Latest Ref Rng & Units 8/19/2022   WBC      4.0 - 11.0 10e3/uL 12.6 (H)   RBC Count      3.80 - 5.20 10e6/uL 5.06   Hemoglobin      11.7 - 15.7 g/dL 12.8   Hematocrit      35.0 - 47.0 % 40.4   MCV      78 - 100 fL 80   MCH      26.5 - 33.0 pg 25.3 (L)   MCHC      31.5 - 36.5 g/dL 31.7   RDW      10.0 - 15.0 % 14.1   Platelet Count      150 - 450 10e3/uL 387   % Neutrophils      % 71   % Lymphocytes      % 20   % Monocytes      % 6   % Eosinophils      % 3   % Basophils      % 0   % Immature Granulocytes      % 0   NRBCs per 100 WBC      <1 /100 0   Absolute Neutrophils      1.6 - 8.3 10e3/uL 8.8 (H)   Absolute Lymphocytes      0.8 - 5.3 10e3/uL 2.5   Absolute Monocytes      0.0 - 1.3 10e3/uL 0.8   Absolute Eosinophils      0.0 - 0.7 10e3/uL 0.4   Absolute Basophils      0.0 - 0.2 10e3/uL 0.1   Absolute Immature Granulocytes      <=0.4 10e3/uL 0.1   Absolute NRBCs      10e3/uL 0.0   Color  Urine      Colorless, Straw, Light Yellow, Yellow Straw   Appearance Urine      Clear Clear   Glucose Urine      Negative mg/dL 1000 (A)   Bilirubin Urine      Negative Negative   Ketones Urine      Negative mg/dL Negative   Specific Gravity Urine      1.003 - 1.035 1.020   Blood Urine      Negative Negative   pH Urine      5.0 - 7.0 5.0   Protein Albumin Urine      Negative mg/dL Negative   Urobilinogen mg/dL      Normal, 2.0 mg/dL Normal   Nitrite Urine      Negative Negative   Leukocyte Esterase Urine      Negative Negative   RBC Urine      <=2 /HPF 1   WBC Urine      <=5 /HPF 5   Squamous Epithelial /HPF Urine      <=1 /HPF <1   MPO Marline IgG Instrument Value      <3.5 U/mL 0.7   Myeloperoxidase Antibody IgG      Negative Negative   Proteinase 3 Marline IgG Instrument Value      <2.0 U/mL <1.0   Proteinase 3 Antibody IgG      Negative Negative   Protein Random Urine      g/L 0.11   Protein Total Urine g/gr Creatinine      0.00 - 0.20 g/g Cr 0.09   Creatinine Urine      mg/dL 128   IGG      610 - 1,616 mg/dL 641   IGA      84 - 499 mg/dL 204   IGM      35 - 242 mg/dL 32 (L)   Creatinine      0.52 - 1.04 mg/dL 1.24 (H)   GFR Estimate      >60 mL/min/1.73m2 51 (L)   CD19 B Cells      6 - 27 % <1 (L)   Absolute CD19      107 - 698 cells/uL <1 (L)   Neutrophil Cytoplasmic Antibody      <1:10 <1:10   Neutrophil Cytoplasmic Antibody Pattern       The ANCA IFA is <1:10.  No further testing will be performed.   AST      0 - 45 U/L 16   ALT      0 - 50 U/L 34   Albumin      3.4 - 5.0 g/dL 4.2   CRP Inflammation      0.0 - 8.0 mg/L 9.1 (H)   Sed Rate      0 - 30 mm/hr 15   Vitamin D Deficiency screening      20 - 75 ug/L 36     Component      Latest Ref Rng & Units 1/19/2023   WBC      4.0 - 11.0 10e3/uL 16.1 (H)   RBC Count      3.80 - 5.20 10e6/uL 5.39 (H)   Hemoglobin      11.7 - 15.7 g/dL 13.4   Hematocrit      35.0 - 47.0 % 41.9   MCV      78 - 100 fL 78   MCH      26.5 - 33.0 pg 24.9 (L)   MCHC      31.5 - 36.5 g/dL 32.0    RDW      10.0 - 15.0 % 15.4 (H)   Platelet Count      150 - 450 10e3/uL 383   % Neutrophils      % 79   % Lymphocytes      % 16   % Monocytes      % 4   % Eosinophils      % 1   % Basophils      % 0   % Immature Granulocytes      % 0   NRBCs per 100 WBC      <1 /100 0   Absolute Neutrophils      1.6 - 8.3 10e3/uL 12.6 (H)   Absolute Lymphocytes      0.8 - 5.3 10e3/uL 2.6   Absolute Monocytes      0.0 - 1.3 10e3/uL 0.7   Absolute Eosinophils      0.0 - 0.7 10e3/uL 0.2   Absolute Basophils      0.0 - 0.2 10e3/uL 0.1   Absolute Immature Granulocytes      <=0.4 10e3/uL 0.1   Absolute NRBCs      10e3/uL 0.0   Sodium      136 - 145 mmol/L 137   Potassium      3.4 - 5.3 mmol/L 4.0   Chloride      98 - 107 mmol/L 98   Carbon Dioxide (CO2)      22 - 29 mmol/L 25   Anion Gap      7 - 15 mmol/L 14   Urea Nitrogen      6.0 - 20.0 mg/dL 23.6 (H)   Creatinine      0.51 - 0.95 mg/dL 1.09 (H)   Calcium      8.6 - 10.0 mg/dL 10.3 (H)   Glucose      70 - 99 mg/dL 232 (H)   Alkaline Phosphatase      35 - 104 U/L 95   AST      10 - 35 U/L 23   ALT      10 - 35 U/L 26   Protein Total      6.4 - 8.3 g/dL 7.7   Albumin      3.5 - 5.2 g/dL 4.9   Bilirubin Total      <=1.2 mg/dL 0.3   GFR Estimate      >60 mL/min/1.73m2 59 (L)   Color Urine      Colorless, Straw, Light Yellow, Yellow Light Yellow   Appearance Urine      Clear Clear   Glucose Urine      Negative mg/dL >=1000 (A)   Bilirubin Urine      Negative Negative   Ketones Urine      Negative mg/dL 10 (A)   Specific Gravity Urine      1.003 - 1.035 1.033   Blood Urine      Negative Negative   pH Urine      5.0 - 7.0 6.0   Protein Albumin Urine      Negative mg/dL Negative   Urobilinogen mg/dL      Normal, 2.0 mg/dL Normal   Nitrite Urine      Negative Negative   Leukocyte Esterase Urine      Negative Negative   Iron      37 - 145 ug/dL 51   Iron Binding Capacity      240 - 430 ug/dL 362   Iron Sat Index      15 - 46 % 14 (L)   Parathyroid Hormone Intact      15 - 65 pg/mL 51    Calcium Ionized Whole Blood      4.4 - 5.2 mg/dL 4.9   Ferritin      11 - 328 ng/mL 70   TSH      0.30 - 4.20 uIU/mL 0.40   3 views cervical spine radiographs 2/10/2023 4:10 PM     History: neck pain; Neck pain     Comparison: MRI cervical spine 4/14/2015.     Findings:  AP, lateral, and odontoid views of the cervical spine were obtained.     Down to the level of C7 is well visualized on the lateral view(s). The  cervicothoracic junction is partially visualized.     There is no acute osseous abnormality. No prevertebral soft tissue  swelling. Normal cervical lordosis is maintained.       Moderate loss of intervertebral disc height at C6-7. Mild loss of disc  height at C5-6. Additional multilevel degenerative changes including  endplate osteophytosis and uncinate hypertrophy. Dens is obscured by  the overlying maxilla on the odontoid view.     The visualized lung apices are clear.                                                                      Impression:  Multilevel cervical degenerative changes, greatest at C6-7.     I have personally reviewed the examination and initial interpretation  and I agree with the findings.     TASHI KELLY MD      Component      Latest Ref Rng & Units 2/10/2023   WBC      4.0 - 11.0 10e3/uL 11.9 (H)   RBC Count      3.80 - 5.20 10e6/uL 5.30 (H)   Hemoglobin      11.7 - 15.7 g/dL 13.2   Hematocrit      35.0 - 47.0 % 40.7   MCV      78 - 100 fL 77 (L)   MCH      26.5 - 33.0 pg 24.9 (L)   MCHC      31.5 - 36.5 g/dL 32.4   RDW      10.0 - 15.0 % 15.3 (H)   Platelet Count      150 - 450 10e3/uL 415   % Neutrophils      % 69   % Lymphocytes      % 23   % Monocytes      % 6   % Eosinophils      % 2   % Basophils      % 0   % Immature Granulocytes      % 0   NRBCs per 100 WBC      <1 /100 0   Absolute Neutrophils      1.6 - 8.3 10e3/uL 8.1   Absolute Lymphocytes      0.8 - 5.3 10e3/uL 2.7   Absolute Monocytes      0.0 - 1.3 10e3/uL 0.8   Absolute Eosinophils      0.0 - 0.7 10e3/uL 0.2    Absolute Basophils      0.0 - 0.2 10e3/uL 0.0   Absolute Immature Granulocytes      <=0.4 10e3/uL 0.0   Absolute NRBCs      10e3/uL 0.0   Sodium      136 - 145 mmol/L 138   Potassium      3.4 - 5.3 mmol/L 4.1   Chloride      98 - 107 mmol/L 99   Carbon Dioxide (CO2)      22 - 29 mmol/L 23   Anion Gap      7 - 15 mmol/L 16 (H)   Urea Nitrogen      6.0 - 20.0 mg/dL 24.6 (H)   Creatinine      0.51 - 0.95 mg/dL 1.07 (H)   Calcium      8.6 - 10.0 mg/dL 10.5 (H)   Glucose      70 - 99 mg/dL 205 (H)   Alkaline Phosphatase      35 - 104 U/L 86   AST      10 - 35 U/L 26   ALT      10 - 35 U/L 30   Protein Total      6.4 - 8.3 g/dL 7.6   Albumin      3.5 - 5.2 g/dL 4.8   Bilirubin Total      <=1.2 mg/dL 0.2   GFR Estimate      >60 mL/min/1.73m2 61   MPO Marline IgG Instrument Value      <3.5 U/mL 0.8   Myeloperoxidase Antibody IgG      Negative Negative   Proteinase 3 Marline IgG Instrument Value      <2.0 U/mL <1.0   Proteinase 3 Antibody IgG      Negative Negative   IGG      610 - 1,616 mg/dL 680   IGA      84 - 499 mg/dL 197   IGM      35 - 242 mg/dL 30 (L)   CD19 B Cells      6 - 27 % <1 (L)   Absolute CD19      107 - 698 cells/uL <1 (L)   Neutrophil Cytoplasmic Antibody      <1:10 <1:10   Neutrophil Cytoplasmic Antibody Pattern       The ANCA IFA is <1:10.  No further testing will be performed.   % Retic      0.5 - 2.0 % 1.4   Absolute Retic      0.025 - 0.095 10e6/uL 0.073   CRP Inflammation      <5.00 mg/L 6.67 (H)   Sed Rate      0 - 30 mm/hr 12   Lipase      13 - 60 U/L 26     ROS: A comprehensive ROS was done. Positives are per HPI.      HISTORY REVIEW:  Past Medical History:   Diagnosis Date     Abnormal glandular Papanicolaou smear of cervix 1992     Allergic rhinitis     Allergy, airborne subst     Arthritis      ASCVD (arteriosclerotic cardiovascular disease)      Chronic pain      Chronic pancreatitis (H)     idiopathic, Tx: PPI, antioxidants, pancreatic enzymes     Common migraine      Coronary artery disease       Costochondritis      Difficulty in walking(719.7)      Dyspnea on exertion      Ectasia, mammary duct     [facility], persistent nipple discharge     Elevated fasting glucose      Gastro-oesophageal reflux disease      Granulomatosis with polyangiitis (H)      Hernia      History of angina      Hyperlipidemia LDL goal < 100      Hypertension goal BP (blood pressure) < 140/90     Essential hypertension     Iron deficiency anemia      Ischemic cardiomyopathy      Menorrhagia      Migraine headaches      Mild persistent asthma      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 Matias, neg w/u, Rx'd w/prednisone     Ventral hernia, unspecified, without mention of obstruction or gangrene 2012     Past Surgical History:   Procedure Laterality Date     C/SECTION, LOW TRANSVERSE  1996         CARDIAC SURGERY      cardiac stent- recent in 16; 4 other stents     COLONOSCOPY N/A 2023    Procedure: COLONOSCOPY, WITH POLYPECTOMY;  Surgeon: Percy Gross MD;  Location: [facility] OR     DILATION AND CURETTAGE N/A 2016    Procedure: DILATION AND CURETTAGE;  Surgeon: Nahed Butler MD;  [facility]     ENDOMETRIAL SAMPLING (BIOPSY) N/A 2023    Procedure: ENDOMETRIAL BIOPSY;  Surgeon: Nahed Butler MD;  [facility]     ESOPHAGOSCOPY, GASTROSCOPY, DUODENOSCOPY (EGD), COMBINED N/A 03/31/2022    Procedure: ESOPHAGOGASTRODUODENOSCOPY, WITH BIOPSY;  Surgeon: Enzo Sesay MD;  [facility]     ESOPHAGOSCOPY, GASTROSCOPY, DUODENOSCOPY (EGD), COMBINED N/A 5/25/2023    Procedure: ESOPHAGOGASTRODUODENOSCOPY, WITH BIOPSY;  Surgeon: Percy Gross MD;  Location: [facility] OR     EXAM UNDER ANESTHESIA PELVIC N/A 4/28/2023    Procedure: EXAM UNDER ANESTHESIA;  Surgeon: Nahed Butler MD;  [facility]     HC UGI ENDOSCOPY W EUS  11/07/2008    Dr. Pastrana, possible chronic pancreatitis, fatty liver     HERNIA REPAIR  12/01/2012    [place] [facility] [place]     INSERT INTRAUTERINE DEVICE N/A 07/06/2016    Procedure: INSERT INTRAUTERINE DEVICE;  Surgeon: Nahed Butler MD;  [facility]     INT UTERINE FIBRIOD EMBOLIZATION  10/29/2014     LAPAROSCOPIC CHOLECYSTECTOMY  05/28/2008    Dr. Arnol GRUBBS TX, CERVICAL  1992    s/p LEEP, done at [facility] in [place].     ORBITOTOMY Right 03/15/2016    Procedure: ORBITOTOMY;  Surgeon: Myron Cyr MD;  Location: [facility]     ORBITOTOMY Right 08/04/2017    Procedure: ORBITOTOMY;  RIGHT ORBITOTOMY AND BIOPSY;  Surgeon: Charis Holbrook MD;  [facility]     REMOVE INTRAUTERINE DEVICE N/A 4/28/2023    Procedure: Remove intrauterine device,;  Surgeon: Nahed Butler MD;  [facility]     REPAIR PTOSIS Right 11/17/2017    Procedure: REPAIR PTOSIS;  RIGHT UPPER LID PTOSIS REPAIR;  Surgeon: Myron Cyr MD;  Location:  [facility]     UPPER GI ENDOSCOPY  10/21/2008    mild gastritis, Dr. Rocky [facility] ECHO HEART XTHORACIC,COMPLETE, W/O DOPPLER  02/04/2004    [facility] WNL, EF 60%     Family History   Problem Relation Age of Onset     Hypertension Mother      Arthritis Mother      Heart Disease Mother         long qt syndrome     Thyroid Disease Mother      C.A.D. Mother      Heart Disease Father 50        heart attack     Cerebrovascular  Disease Father      Diabetes Father      Hypertension Father      Depression Father      C.A.D. Father      Neurologic Disorder Sister         migraines     Neurologic Disorder Sister         migraines     Heart Disease Brother 15        MI at 15, 16.      Arthritis Brother         he passed away, had severe arthritis at age 11     C.A.D. Brother      Anesthesia Reaction Son         PONV     Respiratory Son         asthma     Hypertension Maternal Aunt      Diabetes Maternal Uncle      Hypertension Maternal Uncle      Arthritis Maternal Uncle      Eye Disorder Maternal Uncle         cataracts     Cerebrovascular Disease Maternal Uncle      Family History Negative Other         neg for RA, SLE     Unknown/Adopted No family hx of         unknown neurological issues in her family, mother was adopted     Skin Cancer No family hx of         No known family hx of skin cancer     Deep Vein Thrombosis (DVT) No family hx of      Social History     Socioeconomic History     Marital status: Single     Spouse name: Not on file     Number of children: 1     Years of education: Not on file     Highest education level: Not on file   Occupational History     Employer: NONE    Tobacco Use     Smoking status: Some Days     Current packs/day: 0.00     Average packs/day: 0.2 packs/day for 1 year (0.2 ttl pk-yrs)     Types: Cigarettes     Start date: 2015     Last attempt to quit: 2016     Years since quittin.3     Smokeless tobacco: Never   Vaping Use     Vaping status: Every Day     Substances: Nicotine   Substance and Sexual Activity     Alcohol use: No     Alcohol/week: 0.0 standard drinks of alcohol     Drug use: No     Sexual activity: Not Currently   Other Topics Concern     Parent/sibling w/ CABG, MI or angioplasty before 65F 55M? Yes   Social History Narrative    Balanced Diet - sometimes    Osteoporosis Prevention Measures - Dairy servings per day: 2 servings weekly    Regular Exercise -  Yes Describe  walking 4 times a week    Dental Exam - NO    Seatbelts used - Yes    Self Breast Exam - Yes    Abuse: Current or Past (Physical, Sexual or Emotional)- No    Do you have any concerns about STD's -  No    Do you feel safe in your environment - No    Guns stored in the home - No    Sunscreen used - Yes    Lipids -  YES - Date: 053102    Glucose -  YES - Date: 012804    Eye Exam - YES - Date: one year ago    Colon Cancer Screening - No    Hemoccults - NO    Pap Test -  YES - Date: 070904, remote history of LEEP    Mammography - YES - Date: last spring, would like to discuss, needs a referral to [facility] [place]    DEXA - NO    Immunizations reviewed and up to date - Yes, last td given in 1997 or 1998     Social Drivers of Health     Financial Resource Strain: Low Risk  (2/9/2024)    Financial Resource Strain      Within the past 12 months, have you or your family members you live with been unable to get utilities (heat, electricity) when it was really needed?: No   Food Insecurity: High Risk (2/9/2024)    Food Insecurity      Within the past 12 months, did you worry that your food would run out before you got money to buy more?: Yes      Within the past 12 months, did the food you bought just not last and you didn t have money to get more?: No   Transportation Needs: Low Risk  (2/9/2024)    Transportation Needs      Within the past 12 months, has lack of transportation kept you from medical appointments, getting your medicines, non-medical meetings or appointments, work, or from getting things that you need?: No   Physical Activity: Not on file   Stress: Not on file   Social Connections: Not on file   Interpersonal Safety: Low Risk  (4/9/2025)    Interpersonal Safety      Do you feel physically and emotionally safe where you currently live?: Yes      Within the past 12 months, have you been hit, slapped, kicked or otherwise physically hurt by someone?: No      Within the past 12 months, have you been  humiliated or emotionally abused in other ways by your partner or ex-partner?: No   Housing Stability: Low Risk  (2024)    Housing Stability      Do you have housing? : Yes      Are you worried about losing your housing?: No     Patient Active Problem List   Diagnosis     Seasonal affective disorder     Allergic rhinitis     PCOS (polycystic ovarian syndrome)     Moderate persistent asthma     Chronic pancreatitis (H)     Hypertension goal BP (blood pressure) < 140/90     Common migraine     NSTEMI (non-ST elevated myocardial infarction) (H)     Hyperlipidemia LDL goal <70     ASCVD (arteriosclerotic cardiovascular disease)     GERD (gastroesophageal reflux disease)     Ischemic cardiomyopathy     Hypertensive heart disease     Uterine leiomyoma     Iron deficiency anemia     Costochondritis     Vitamin D deficiency     Breast cancer screening     Spinal stenosis in cervical region     Fibromyalgia     Seronegative rheumatoid arthritis (H)     Type 2 diabetes, HbA1C goal < 8% (H)     Type 2 diabetes mellitus with other specified complication (H)     Hyperlipidemia associated with type 2 diabetes mellitus (H)     Hypertension associated with diabetes (H)     Overweight with body mass index (BMI) 25.0-29.9     Tobacco use disorder     Telogen effluvium     CAD S/P percutaneous coronary angioplasty     Status post coronary angiogram     ANCA-associated vasculitis (H)     Wegener's vasculitis     Type 1 diabetes mellitus with complications (H)     Chest discomfort     Urinary frequency     Abdominal pain, epigastric     Hypokalemia     Leukocytosis, unspecified type     Acute pancreatitis, unspecified complication status, unspecified pancreatitis type       Pregnancy Hx: She is . All miscarriages were in first trimester. H/o OC use in the past. 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    PMHx, FHx, SHx were reviewed, unchanged.    Outpatient Encounter Medications as of 2025   Medication  Sig Dispense Refill     acetaminophen (TYLENOL) 325 MG tablet Take 1-2 tablets (325-650 mg) by mouth every 6 hours as needed for pain (headache) 250 tablet 4     ADVAIR DISKUS 250-50 MCG/ACT inhaler Inhale 1 puff into the lungs 2 times daily       albuterol (2.5 MG/3ML) 0.083% neb solution INL 1 VIAL VIA NEBULIZATION Q 4 TO 6 HOURS PRN  1     albuterol (PROAIR HFA, PROVENTIL HFA, VENTOLIN HFA) 108 (90 BASE) MCG/ACT inhaler Inhale 2 puffs into the lungs every 6 hours as needed for shortness of breath / dyspnea or wheezing 3 Inhaler 1     BANOPHEN 25 MG tablet Take 25 mg by mouth At Bedtime       BD PEN NEEDLE MARCK 2ND GEN 32G X 4 MM miscellaneous USE ONE PEN NEEDLE DAILY OR AS DIRECTED 100 each 2     blood glucose (NO BRAND SPECIFIED) lancets standard Use to test blood sugar 1-4 times daily or as directed. 400 each 3     blood glucose (NO BRAND SPECIFIED) test strip Use to test blood sugar 1-4 times daily or as directed 400 strip 3     blood glucose monitoring (NO BRAND SPECIFIED) meter device kit Use to test blood sugar 1-4 times daily or as directed. 1 kit 0     Blood Pressure Monitor KIT 1 each daily Monitor home blood pressure as instructed by physician.  Dispense [facility] blood pressure kit. 1 kit 0     calcium carbonate (TUMS) 500 MG chewable tablet Take 3-4 tablets (1,500-2,000 mg) by mouth daily as needed 90 tablet 3     clopidogrel (PLAVIX) 75 MG tablet Take 1 tablet (75 mg) by mouth daily. . 30 tablet 3     clotrimazole (MYCELEX) 10 MG lozenge Place 1 lozenge (10 mg) inside cheek 5 times daily 50 lozenge 1     cyanocobalamin (VITAMIN B-12) 1000 MCG tablet Take 1 tablet by mouth daily at 2 pm       cyclobenzaprine (FLEXERIL) 10 MG tablet Take 1 tablet (10 mg) by mouth 2 times daily as needed for muscle spasms 60 tablet 3     cycloSPORINE (RESTASIS) 0.05 % ophthalmic emulsion 1 month supply 11 refills 1 each 11     dicyclomine (BENTYL) 20 MG tablet TAKE 1 TABLET(20 MG) BY MOUTH FOUR TIMES DAILY AS NEEDED. 60  tablet 4     empagliflozin (JARDIANCE) 25 MG TABS tablet Take 1 tablet (25 mg) by mouth daily. Follow up visit needed. Call  to schedule. 90 tablet 0     EPIPEN 2-RIKY 0.3 MG/0.3ML injection INJECT 0.3 MG INTO THE MUSCLE PRF ANAPHYALAXIS  0     esomeprazole (NEXIUM) 40 MG DR capsule Take 1 capsule (40 mg) by mouth 2 times daily Take 30-60 minutes before eating. 180 capsule 0     fluticasone (FLONASE) 50 MCG/ACT nasal spray Spray 1 spray in nostril as needed       folic acid (FOLVITE) 1 MG tablet TAKE 1 TABLET BY MOUTH EVERY DAY 90 tablet 0     insulin glargine (LANTUS PEN) 100 UNIT/ML pen Inject 56 Units Subcutaneous every morning Or as directed. 75 mL 1     lisinopril-hydrochlorothiazide (ZESTORETIC) 20-25 MG tablet Take 1 tablet by mouth daily. 30 tablet 3     magnesium 250 MG tablet TAKE 1 TABLET(250 MG) BY MOUTH TWICE DAILY 60 tablet 3     Magnesium Oxide 250 MG tablet Take 1 tablet (250 mg) by mouth 2 times daily. 60 tablet 1     metFORMIN (GLUCOPHAGE XR) 500 MG 24 hr tablet TAKE 4 TABLETS BY MOUTH DAILY WITH DINNER 360 tablet 1     metoprolol succinate ER (TOPROL XL) 100 MG 24 hr tablet Take 1 tablet (100 mg) by mouth daily. 30 tablet 3     Multiple Vitamin (DAILY-GERALD MULTIVITAMIN) TABS TAKE 1 TABLET BY MOUTH EVERY  tablet 3     nitroGLYcerin (NITROSTAT) 0.4 MG sublingual tablet FOR CHEST PAIN PLACE 1 TABLET UNDER THE TONGUE EVERY 5 MINUTES FOR 3 DOSES. IF SYMPTOMS PERSIST 5 MINUTES AFTER 1ST DOSE CALL 911. 25 tablet 11     olopatadine (PATANOL) 0.1 % ophthalmic solution Place 1 drop into both eyes as needed       ondansetron (ZOFRAN ODT) 8 MG ODT tab TAKE 1 TABLET BY MOUTH EVERY 8 HOURS AS NEEDED FOR NAUSEA 20 tablet 1     pravastatin (PRAVACHOL) 40 MG tablet TAKE 1 TABLET BY MOUTH EVERY DAY 30 tablet 3     pregabalin (LYRICA) 25 MG capsule Take 2 capsules (50 mg) by mouth daily. 180 capsule 1     sennosides (SENOKOT) 8.6 MG tablet 1-2 tabs a day as needed for constipation 60 tablet 1      spironolactone (ALDACTONE) 25 MG tablet TAKE 1 TABLET (25 MG) BY MOUTH DAILY. MAY ALSO TAKE 0.5 TABLETS (12.5 MG) DAILY AS NEEDED (FOR WEIGHT GAIN). 45 tablet 3     sucralfate (CARAFATE) 1 GM tablet Take 1 tablet (1 g) by mouth 4 times daily 120 tablet 3     traMADol (ULTRAM) 50 MG tablet TAKE 1 TABLET(50 MG) BY MOUTH EVERY 8 HOURS AS NEEDED FOR SEVERE PAIN 60 tablet 3     YUVAFEM 10 MCG TABS vaginal tablet PLACE 1 TABLET (10 MCG) VAGINALLY TWICE A WEEK 24 tablet 1     No facility-administered encounter medications on file as of 5/23/2025.       Allergies   Allergen Reactions     Amoxicillin-Pot Clavulanate      Unknown possible hives and edema     Amoxicillin-Pot Clavulanate      Artificial Sweetner (Informational Only)  Other (See Comments)     Increased headache     Azithromycin      Clavulanic Acid      Diatrizoate Other (See Comments)     Pt wants this listed because she is allergic to shellfish      Imitrex [Triptans]      Severe face/neck/chest tightness and flushing side effects      Penicillins Hives     Unknown      Pork Allergy      Stomach pain, cramping, diarrhea, itching, nausea and headaches     Shellfish Allergy Hives and Swelling     Shellfish-Derived Products      Sulfa Antibiotics Hives and Swelling     Sulfatolamide      Zithromax [Azithromycin Dihydrate] Swelling     Unknown        Ph.E:    BP (!) 144/67   Pulse 76   Wt 71.2 kg (157 lb)   LMP  (LMP Unknown)   BMI 28.24 kg/m      GA: NAD, pleasant  HEENT: nl conj, sclera, EOMI. no campos-orbital edema under R eye  Neck; no cervical LAP  Chest: CTAB  CV: no M/R/G, RRR  Abdomen: soft, NT  MSK: no synovitis or joint tenderness  Skin: no rash, thin hair  Neuro: non focal  Psych: nl affect    Assessment/Plan:    1-Seropositive non-erosive RA (arthritis, AM stiffness, high CRP, RF 26 but re-check neg 3/2015 on HCQ) Dx 5/2013, FMS, new pleuritic CP 3/20-15-5/2015 resolved on steroids. She is on  mg bid since 5/2014. She tolerates it well and  it's helping some but not enough to control all her pain. Continued having flare ups of joint pain, could be GPA related. Some pain is due to FMS.My impression is that her arthralgias are a combination of RA/ IA sec GPA, fibromyalgia and chronic pain.     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 Prednisone was not a good option for her given weight gain, diabetes. She tried and did not tolerate MTX, AZA.    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 Repeat MPO was positive in 6/2017.    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 Shah and Dr. Valdez at [place]. 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     She is on rituximab since 12/12/2018 with great response, minor allergic reaction which could be managed by pre-meds and extra steroid dose. Developed high BG and vaginal yeast infections after solumedrol premed. Treated candida with fluconazole. Will decrease solumerdol dose to IV 80 mg prior to receiving rituximab. 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 However, feels Rituximab effects wear off around 4 months. According to ACR guidelines can give every 4-6 months. Pt elected to received this upcoming September which will be approximately 5 months from last dose. Repeat Orbit CT in September 2021 demonstrated improvement.     Last visit in April 2022-- Recommended marie given b cell depletion tx as rituximab blocks the response to covid vaccine, she is concerned about side effects. I advised her to discuss with [facility] pharmacist.      1/25/2023: Janine has been ill since Dx of COVID on 12/17/2022. Her most recent labs were reviewed, stable vasculitis labs in 8/2022 with CD19<1, neg vasculitis markers. In 1/2023, no anemia and nl thyroid function test. I ordered vit D as add on. 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 She does need to follow up on abnormal thyroid US and mammogram as well. Our plan for ANCA vasculitis was to give rituximab 1 gram m1schyty as benefit did not last 6 months with last rituximab on 9/7/2022, but today we both agreed to give next dose in March after 6 months to let her body heal from COVID. I will see her in person, in early march to determine if it is ok to give another dose of rituxiamb. 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 Also ordered shower chair, commmandi per her request given significant fatigue, body pain.    3/9/2023: S/p last rituximab 1 gram on 9/7/2022. 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 Stable labs and eye inflammation.    2-Fat pads. She was seen by endocrinology and cushing was ruled out in 4/2014. Was advised to do f/u for enlarged thyroid/thyroid nodules.  3-Hair loss. Continue with dermatology  4-Chronic pain. F/U with pain clinic  5-Vit D deficiency. Was replaced with vit D 50, 000 units po qwk x 12 wk then 2000 units every day  6-?HCQ toxicity on OCT 7/2021, now off HCQ, could explain increased arthralgia  7- Chronic Headaches. Symptoms worsen after taking zofran. Has received compazine in hospital in the past without adverse effect. Will have patient trial Compazine       3/2023 plan:    Spine referral for abnormal C spine (DJD) in 2/2023.    Rituximab 1 gram one dose only this month    Labs in May 2023    Follow up eye exam 8/2023    Return in person in about 5-6 months       9/6/2023: last rituximab 1 gram one dose for ANCA vasculitis on 3/28/2023, increased arthralgia, will try rituximab at full dose, zanaflex and consider  resuming HCQ as last eye exam did rule out HCQ toxicity (HCQ was stopped with concern for possible HCQ toxicity).    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 Will schedule rituximab in Feb, pain mx was discussed. Labs are stable.    S/p rituximab 1 gram on 9/19/2023, 10/3/2023.    Plan:      Rituximab 1 gram every 2 weeks x 2 infusion in Feb for ANCA vasculitis    Labs with next rituximab infusion    X-rays today    Acupuncture referral    Return in person in about 4 months      6/5/2024: more joint pain, rituximab does not last 6 months, will move up appointment. Will check orbit CT. Stable labs. I asked her PCP to help with pain mx.    Labs today    Move up rituximab 1 gram once from Aug to June 2024, our pharmacist will contact you    CT orbit with no contrast    Return in about 3-4 months in person      10/11/2024:    GPA is stable on rituximab q6 mo, suspect knee pain to be from OA, pain sx to be from neuropathy. Discussed mx, going up on lyrica,s he is concerned about SE like weight gain, but willing to try increasing a little bit. Recommend follow up with PCP for pain mx, acupuncture and LDN could be good options.    Plan:    Rituximab 1 gram every 2 weeks in March 2025       Refer to ortho, knee specialist      Go up on lyrica to 50 mg a day      Return in about 4-5 months (in person)        1/23/2025:    Stable GPA on rituximab, pain seems to be neuropathy related, failed lyrica. She wishes to do follow up with PCP for pain mx.    Plan:    Rituximab 1 gram q2wk x 2 in March    Labs with  rituximab    Follow up with Dr. Solano for pain management, consider LDN, TENS unit is a good idea    Ok to come off lyrica, bt taper it off slowly    Return in person in about 4-5 months      Today 5/23/2025:    Stable GPA, rituximab has been helpful to keep GPA under control. Pain is most likely due to neuropathy (DM and GPA both could cause neuropathy), will ask PCP to help with pain mx, consider LDN. She would like to re-try ketoprofen compound, tried it years ago.    S/p rituximab 1gram on 3/27, 4/10/2025.    Plan:    Rituximab 1 gram every 2 weeks x 2 in early September    Labs in July 2025 then every 3 months    Try ketoprofen 5% cream, 2-4 times a day over your joints    Return in 6 months in person    TT 40 min was spent on date of the encounter doing chart review, history and exam, documentation and further activities as noted above. Any prior notes, outside records, laboratory results, and imaging studies were reviewed if relevant.      The longitudinal plan of care for the diagnosis(es)/condition(s) as documented were addressed during this visit. Due to the added complexity in care, I will continue to support Janine in the subsequent management and with ongoing continuity of care.      Orders Placed This Encounter   Procedures     AST     ALT     Myeloperoxidase and Proteinase 3 Panel     Albumin level     Creatinine     CRP inflammation     UA with Microscopic reflex to Culture     Protein  random urine     Creatinine random urine     Erythrocyte sedimentation rate auto     CD19 B Cell Count     ANCA IgG by IFA with Reflex to Titer     Immunoglobulins A G and M     CBC with Platelets & Differential        Majo Mckinnon MD      Again, thank you for allowing me to participate in the care of your patient.      Sincerely,    Majo Mckinnon MD

## 2025-05-23 NOTE — PROGRESS NOTES
Rheumatology F/U Visit Note    Last visit note: 1/23/2025    Today's visit date: 5/23/2025    Reason for in person visit: F/U GPA, high risk med use    HPI     Ms. 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 She is s/p tx with HCQ since 5/2014, now off due to abnormal eye exam. On rituximab since 12/20218 with great response. Previously tried and did not tolerate MTX, AZA.    She had lateral orbitotomy and debulking orbital mass right eye on 9/26/18 with Dr. Shah and Dr. Valdez at [facility]. 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 There were no complications according to the op note.      Today 5/23/2025:      Legs hurt swell up on walking, timur when it is hot outside, balance is off    Had R eye swelling x past 6 wk, better now, had follow up with Dr. Vernon, no concern for GPA flare    Leg pain is uncomfortable    Takes tramadol rarely    Tens unit did not work    Tried sound tx    No access to pool tx or acupuncture    Has neuropathy pain in legs, feet, decided not to stop taking lyrica    Rituximab helps with joint pain, benefit lasts 4-5 months        1/23/2025:    Continues to have pain over joints with neuropathy, lyrica did not help, likes to stop it. Has Rx for TENS unit, will use it     10/11/2024:    -lots of pain affecting feet, legs    -has not left house since June, bottom of feet swell up    -hair is coming out    -rituximab does not help with pain    -has swelling inside mouth    -the other day, R side of face swelled up    -after last rituxiamb, had candidia infection under breast, BG was down    -had sweating fater rituximab    -lyrica is not helping    9/6, 9/26 rituximab 1 gram    6/5/2024:    -reports arm weakness    -has back pain    -continues to have joint pain    -has more headaches, R eye pain    -neuropathy is an issue     1/31/2024:      Nortriptyline was prescribed by  another provider, but has not tried it yet    R side of her face started swelling again.    Reports pain over neck, hips, knees and hands.          9/6/2023:    -reports pain/swelling over hands/feet, has more arthritis problem over past year  -gets numbness over toes, bottom of feet, can't see neurology till 1/2024  -walking causes pain in the feet  -gets pain over hips after a trip to [place], it is a new problem  -sometimes gets swelling over R cheek, noticed it yesterday outside with heat, her face was also bright red        3/9/2023: Reports pain/swelling of her hands. S/p last rituximab 1 gram on 9/7/2022.       1/25/2023: Janine is doing a phone visit for follow up, was feeling ill and switched in person to phone visit. She got sick around Thanksgiving, saw her PCP on 12/17, was tested positive for COVID, it took a longtime to feel better, did not take paxlovid as it was already past 2 weeks. Since then, she has not been feeling well. Has headaches, body ache, her eyes especially R eye look pink, they burn and itch a lot. Has sense of smell but can not taste her food. She is very tired, hardly leaves her house. Last time, left house for doctor visit, was tired and sore. Has tingling and pinprick feeling over arms and legs. Has upcoming follow up with PCP on 2/10. She had thyroid US for thyroid nodules. She had abnormal screening mammogram on 1/9, will go back for diagnostic mammogram and US of L breast on 1/31.          08/19/2022  Reports fatigue and headaches. Headache worsens after taking Zofran for nausea. Joint symptoms come and go. Last rituximab on 4/6/22. Rituximab is helpful but feels like it wears off prior to the 6 months. Next appointment scheduled for October 2022. Develops intermittent vaginal yeast infection and thrush related to rising blood glucose. Right eye without symptoms. No new fevers, chills, skin changes. Son is wondering if she takes herbal supplements for headaches will this interact  with any of her medications. Scheduled to meet with pharmacist today.        4/22/2022:   Each rituximab infusion causes vaginal yeast infection and thrush related to rise in BG. Her BG has stayed in higher level since last rituximab. Has more ache and pain, myalgia and arthralgia. Recently has had headaches with high BP. Had last rituximab 1000 mg on 4/6.Her R eye is itching and swelling up.  Today, her BG is 177.Has gained 20 lbs since Feb 2022. Had severe pain in her arm after covid vaccine, it took a month to get better.      12/16/2021: Janine presents for follow up. Reports ESOB with cold, has ongoing joint pain. R eye pain is intermittent.  Reports headaches after rituximab 1 gram on 10/18/2021.  Last week, her R knee was hurting.      8/20/2021: Janine has pain over arms, knees. Some days, feel stiff all day long. Some days can't do stairs. Hard to tell if there is swelling as has more water retention during summer.  Some days, eye swells up like today. No fevers, SOB, CP or cough.  Has rosacea but some days wakes up with heat rash over sun. Her face is peeling.  Sometimes can't lift things or grab things with pain from elbow to hands. Has shoulder and neck pain but this forearm pain is new.  Stopped HCQ in mid July due to concern for potential HCQ toxicity on OCT, her eye exam with Dr. Vernon has been re-scheduled and upcoming on 9/14 to address HCQ toxicity, pain is not worse off HCQ.  Not COVID vaccinated but is cautious.      5/10/2021: Joint ache, knees hurt, R elbow hurts, R arm hurts, R hand hurts. Has lots of swelling in her joints especially hands.    Depending on weather, joints jhurt, sometimes pain is 7/10.  Has problem with taking stairs and balance.  Gets off balance.   R eye gets tinglin, swelling.  Gets out of breath, gets worse with seasonal allergies.  Over past month and half, took nitro more, like 8 times.  No hemooptysis, no hematuria.  Has allergies.      4/21/2021: Had last rituximab 1 gram  on 1/19, 2/2/2021. Reports rituximab starts to wear off earlier, has more sx this time. Eye swelling is intermittent. Gets indigestion/ GERD sx with constipation after the infusion.  She reports having asthma, swelling of neck, arms. She thinks that it could be from her vasculitis. She is worried about going down on rituximab dose.   Otherwise unchanged sx, no SOB or hemoptysis or persistent cough, or   Somedays her knees and hips hurt a lot, feel like bone on bone in her knees, especially on changing position.      Component      Latest Ref Rng 2/28/2013 2/28/2013          12:14 PM 12:14 PM   WBC      4.0 - 11.0 10e9/L     RBC Count      3.8 - 5.2 10e12/L     Hemoglobin      11.7 - 15.7 g/dL     Hematocrit      35.0 - 47.0 %     MCV      78 - 100 fl     MCH      26.5 - 33.0 pg     MCHC      31.5 - 36.5 g/dL     RDW      10.0 - 15.0 %     Platelet Count      150 - 450 10e9/L     Specimen Description           Lyme Screen IgG and IgM           Vitamin D Deficiency screening      30 - 75 ug/L     Ferritin      10 - 300 ng/mL     Sed Rate      0 - 20 mm/h     ALLI Screen by EIA      <1.0     Rheumatoid Factor      0 - 14 IU/mL     CK Total      30 - 225 U/L  78   Uric Acid      2.5 - 7.5 mg/dL 6.7      Component      Latest Ref Rng 2/28/2013          12:14 PM   WBC      4.0 - 11.0 10e9/L    RBC Count      3.8 - 5.2 10e12/L    Hemoglobin      11.7 - 15.7 g/dL    Hematocrit      35.0 - 47.0 %    MCV      78 - 100 fl    MCH      26.5 - 33.0 pg    MCHC      31.5 - 36.5 g/dL    RDW      10.0 - 15.0 %    Platelet Count      150 - 450 10e9/L    Specimen Description       Serum   Lyme Screen IgG and IgM       Test value: <0.75....Interpretation: Negative....If you highly suspect Lyme . . .   Vitamin D Deficiency screening      30 - 75 ug/L    Ferritin      10 - 300 ng/mL    Sed Rate      0 - 20 mm/h    ALLI Screen by EIA      <1.0    Rheumatoid Factor      0 - 14 IU/mL    CK Total      30 - 225 U/L    Uric Acid      2.5 - 7.5  mg/dL      Component      Latest Ref Rng 2/28/2013 2/28/2013 2/28/2013 2/28/2013          12:14 PM 12:14 PM 12:14 PM 12:14 PM   WBC      4.0 - 11.0 10e9/L       RBC Count      3.8 - 5.2 10e12/L       Hemoglobin      11.7 - 15.7 g/dL       Hematocrit      35.0 - 47.0 %       MCV      78 - 100 fl       MCH      26.5 - 33.0 pg       MCHC      31.5 - 36.5 g/dL       RDW      10.0 - 15.0 %       Platelet Count      150 - 450 10e9/L       Specimen Description             Lyme Screen IgG and IgM             Vitamin D Deficiency screening      30 - 75 ug/L       Ferritin      10 - 300 ng/mL    10   Sed Rate      0 - 20 mm/h   23 (H)    ALLI Screen by EIA      <1.0  <1.0 . . .     Rheumatoid Factor      0 - 14 IU/mL 26 (H)      CK Total      30 - 225 U/L       Uric Acid      2.5 - 7.5 mg/dL         5/2013  CBC WITH PLATELETS DIFFERENTIAL       Component Value Range    WBC 11.3 (*) 4.0 - 11.0 10e9/L    RBC Count 4.56  3.8 - 5.2 10e12/L    Hemoglobin 11.1 (*) 11.7 - 15.7 g/dL    Hematocrit 34.3 (*) 35.0 - 47.0 %    MCV 75 (*) 78 - 100 fl    MCH 24.3 (*) 26.5 - 33.0 pg    MCHC 32.4  31.5 - 36.5 g/dL    RDW 16.1 (*) 10.0 - 15.0 %    Platelet Count 315  150 - 450 10e9/L    Diff Method Automated Method      % Neutrophils 71.6  40 - 75 %    % Lymphocytes 20.9  20 - 48 %    % Monocytes 4.3  0 - 12 %    % Eosinophils 2.8  0 - 6 %    % Basophils 0.2  0 - 2 %    % Immature Granulocytes 0.2  0 - 0.4 %    Absolute Neutrophil 8.1  1.6 - 8.3 10e9/L    Absolute Lymphocytes 2.4  0.8 - 5.3 10e9/L    Absolute Monoctyes 0.5  0.0 - 1.3 10e9/L    Absolute Eosinophils 0.3  0.0 - 0.7 10e9/L    Absolute Basophils 0.0  0.0 - 0.2 10e9/L    Abs Immature Granulocytes 0.0  0 - 0.03 10e9/L   AMYLASE       Component Value Range    Amylase 103  30 - 110 U/L   COMPREHENSIVE METABOLIC PANEL       Component Value Range    Sodium 144  133 - 144 mmol/L    Potassium 3.8  3.4 - 5.3 mmol/L    Chloride 105  94 - 109 mmol/L    Carbon Dioxide 23  20 - 32 mmol/L     Anion Gap 17  6 - 17 mmol/L    Glucose 173 (*) 60 - 99 mg/dL    Urea Nitrogen 13  5 - 24 mg/dL    Creatinine 0.83  0.52 - 1.04 mg/dL    GFR Estimate 74  >60 mL/min/1.7m2    GFR Estimate If Black 90  >60 mL/min/1.7m2    Calcium 9.7  8.5 - 10.4 mg/dL    Bilirubin Total 0.4  0.2 - 1.3 mg/dL    Albumin 4.3  3.9 - 5.1 g/dL    Protein Total 7.8  6.8 - 8.8 g/dL    Alkaline Phosphatase 66  40 - 150 U/L    ALT 36  0 - 50 U/L    AST 28  0 - 45 U/L   CK TOTAL       Component Value Range    CK Total 66  30 - 225 U/L   CRP INFLAMMATION       Component Value Range    CRP Inflammation 10.4 (*) 0.0 - 8.0 mg/L   LIPASE       Component Value Range    Lipase 353 (*) 20 - 250 U/L   ERYTHROCYTE SEDIMENTATION RATE AUTO       Component Value Range    Sed Rate 26 (*) 0 - 20 mm/h   ROUTINE UA WITH MICROSCOPIC REFLEX TO CULTURE       Component Value Range    Color Urine Yellow      Appearance Urine Slightly Cloudy      Glucose Urine 30 (*) NEG mg/dL    Bilirubin Urine Negative  NEG    Ketones Urine 5 (*) NEG mg/dL    Specific Gravity Urine 1.026  1.003 - 1.035    Blood Urine Trace (*) NEG    pH Urine 5.0  5.0 - 7.0 pH    Protein Albumin Urine 10 (*) NEG mg/dL    Urobilinogen mg/dL Normal  0.0 - 2.0 mg/dL    Nitrite Urine Negative  NEG    Leukocyte Esterase Urine Negative  NEG    Source Midstream Urine      WBC Urine 1  0 - 2 /HPF    RBC Urine 4 (*) 0 - 2 /HPF    Squamous Epithelial /HPF Urine <1  0 - 1 /HPF    Mucous Urine Present (*) NEG /LPF    Hyaline Casts 3 (*) 0 - 2 /LPF    Calcium Oxalate Moderate (*) NEG /HPF   COMPLEMENT C3       Component Value Range    Complement C3 143  76 - 169 mg/dL   COMPLEMENT C4       Component Value Range    Complement C4 31  15 - 50 mg/dL   HEPATITIS C ANTIBODY       Component Value Range    Hepatitis C Antibody Negative  NEG   CARDIOLIPIN ANTIBODY IGG AND IGM       Component Value Range    Cardiolipin IgG Marline    0 - 15.0 GPL    Value: <15.0      Interpretation:  Negative    Cardiolipin IgM Marline    0 - 12.5  MPL    Value: <12.5      Interpretation:  Negative   LUPUS PANEL       Component Value Range    Lupus Result    NEG    Value: Negative      (Note)      COMMENTS:      The INR is normal.      APTT is normal.  1:2 Mix is not indicated.      DRVVT Screen is normal.      Thrombin time is normal.      NEGATIVE TEST; A LUPUS ANTICOAGULANT WAS NOT DETECTED IN THIS      SPECIMEN WITHIN THE LIMITS OF THE TESTING REPERTOIRE.      If the clinical picture is strongly suggestive of an antiphospholipid      syndrome, recommend anticardiolipin and beta-2-glycoprotein (IgG and      IgM) antibody tests.      Leela Franks M.D.  [phone]      5/2/2013            INR =  0.93 N = 0.86-1.14  (No additional charge)      TT = 15.7 N = 13.0-19.0 sec  (No additional charge)            APTT'S:    Seconds      Reagent =  Stago LA      Normal  =  38      Patient  =  34      1:2 Mix  =  N/A      Reference =  31-43             DILUTE MADELINE VIPER VENOM TEST:      DRVVT Screen Ratio = 0.76 Normal = <1.21         IMMUNOGLOBULIN G SUBCLASSES       Component Value Range    IGG 1030  695 - 1620 mg/dL    IgG1 488  300 - 856 mg/dL    IgG2 325  158 - 761 mg/dL    IgG3 47  24 - 192 mg/dL    IgG4 18  11 - 86 mg/dL   SUNNY ANTIBODY PANEL       Component Value Range    Ribonucleic Protein IgG Antibody 0      Smith Antibody IgG 1      SSA (RO) Antibody IgG 4      SSB (LA) Antibody IgG 0      Scleroderma Antibody IgG 0     BETA 2 GLYCOPROTEIN ANTIBODIES IGG IGM       Component Value Range    Beta-2-Glycopro IgG 1      Beta-2-Glycopro IgM 5     CYCLIC CIT PEPT IGG       Component Value Range    Cyclic Cit Pept IgG/IgA    <20 UNITS    Value: <20      Interpretation:  Negative   DNA DOUBLE STRANDED ANTIBODIES       Component Value Range    DNA-ds    0 - 29 IU/mL    Value: <15      Interpretation:  Negative       CT CHEST PULMONARY EMBOLISM W CONTRAST 5/20/2015 4:57 PM  HISTORY: Pain. SOB. Elevated d-dimer.   TECHNIQUE: 65 mL Isovue 370. Axial images  "with coronal  reconstructions.  COMPARISON: None.  FINDINGS: Calcified granuloma with surrounding scarring in the  posterolateral left upper lobe. Triangular shaped opacity at the right  lung base in the lateral right middle lobe could represent some  scarring, atelectasis or infiltrate. There is also some scarring or  atelectasis in the posteromedial right lung base. Lungs otherwise  clear.  The pulmonary arteries are well opacified. No CT evidence for acute  pulmonary embolus. No aortic dissection.  Diffuse fatty infiltration of the liver.  IMPRESSION  IMPRESSION:   1. No pulmonary embolus identified.  2. Small focus of atelectasis, infiltrate or scarring in the lateral  right middle lobe.  3. Old granulomatous disease.  4. Otherwise negative.  SILVERIO VAZQUEZ MD    Copath Report      Patient Name: FAVIO MARTINEZ   MR#: [id number]   Specimen #: [id number]   Collected: 3/15/2016   Received: 3/15/2016   Reported: 3/17/2016 13:33   Ordering Phy(s): PARVEEN ENRIQUEZ     ORIGINAL REPORT FOLLOWS ADDENDUM  ADDENDUM     TO ORIGINAL REPORT   Status: Signed Out   Date Ordered:3/18/2016   Date Complete:3/18/2016   Date Reported:3/18/2016 12:06   Signed Out By: Marianne Godfrey MD     INTERPRETATION:   This addendum is done to incorporate the results of fungal (GMS) stains   done on the specimen.  Specimen is negative for fungal organisms.  The   original final diagnosis remains unchanged.     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  Negative for malignancy(Please   see microscopic description)     Electronically signed out by:     Marianne Godfrey MD     CLINICAL HISTORY:   right orbital mass     GROSS:   The specimen is received labeled \"right orbital biopsy\" and consists of   red-tan nodule measuring " 1.5 x 0.9 x 0.6 cm with one smooth side and   opposite rough side consistent with periosteum.  The specimen is   bisected revealing homogenous pale tan fleshy cut surface.  Touch   preparations are prepared, air dried and fixed, portion of specimen is   submitted in RPMI for possible lymphoma workup [facility],   ([employer], [place], [place] ).  The remainder is entirely submitted.   (Dictated by: Marianne Godfrey MD 3/15/2016 04:45 PM)     MICROSCOPIC:     Specimen consists of portion of lacrimal gland with acute and chronic   inflammation.  Focal area of microabscess formation associated with   small areas of necrosis are also present.  Inflammation is seen   extending to the periorbital adipose tissue forming panniculitis.   Specimen is negative for malignancy.  Samples sent for immunophenotyping   to [facility], ([employer], [place], [place] ) reveals no evidence   of monoclonality or aberrant antigen expression.  A GMS (fungal) stain   is pending and results will be reported as an addendum.     CPT Codes:   A: 84[address]-FE2, 03179-VBNGW, SOH, 68415-PWEUA, 43955-NRLD     TESTING LAB LOCATION:   [address]  [address] [phone]     COLLECTION SITE:   Client[facility]   [facility]            Component      Latest Ref Rng 4/29/2016   Nucleated RBCs      0 /100 0   Absolute Neutrophil      1.6 - 8.3 10e9/L 8.9 (H)   Absolute Lymphocytes      0.8 - 5.3 10e9/L 3.2   Absolute Monocytes      0.0 - 1.3 10e9/L 0.8   Absolute Eosinophils      0.0 - 0.7 10e9/L 0.2   Absolute Basophils      0.0 - 0.2 10e9/L 0.1   Abs Immature Granulocytes      0 - 0.4 10e9/L 0.1   Absolute Nucleated RBC       0.0   IGG      695 - 1620 mg/dL 836   IgG1      300 - 856 mg/dL 280 (L)   IgG2      158 - 761 mg/dL 277   IgG3      24 - 192 mg/dL 35   IgG4      11 - 86 mg/dL 16   RNP Antibody IgG      0.0 - 0.9 AI <0.2 . . .   Smith SUNNY Antibody IgG      0.0  - 0.9 AI <0.2 . . .   SSA (Ro) (SUNNY) Antibody, IgG      0.0 - 0.9 AI <0.2 . . .   SSB (La) (SUNNY) Antibody, IgG      0.0 - 0.9 AI <0.2 . . .   Scleroderma Antibody Scl-70 SUNNY IgG      0.0 - 0.9 AI <0.2 . . .   Cholesterol      <200 mg/dL 154   Triglycerides      <150 mg/dL 220 (H)   HDL Cholesterol      >49 mg/dL 42 (L)   LDL Cholesterol Calculated      <100 mg/dL 67   Non HDL Cholesterol      <130 mg/dL 111   M Tuberculosis Result      NEG Negative   M Tuberculosis Antigen Value       0.00   Albumin      3.4 - 5.0 g/dL 4.3   ALT      0 - 50 U/L 30   AST      0 - 45 U/L 10   Complement C3      76 - 169 mg/dL 157   Complement C4      15 - 50 mg/dL 32   CRP Inflammation      0.0 - 8.0 mg/L <2.9   Sed Rate      0 - 20 mm/h 2   DNA-ds      <10 IU/mL 1   Cyclic Citrullinated Peptide Antibody, IgG      <7 U/mL 1   Rheumatoid Factor      <20 IU/mL <20   Proteinase 3 Antibody IgG      0.0 - 0.9 AI <0.2 . . .   Myeloperoxidase Antibody IgG      0.0 - 0.9 AI 2.5 (H)   Vitamin D Deficiency screening      20 - 75 ug/L 24   Hemoglobin A1C      4.3 - 6.0 % 7.9 (H)   ALLI by IFA IgG       1:40 (A) . . .     Component      Latest Ref Rng & Units 1/16/2021   WBC      4.0 - 11.0 10e9/L    RBC Count      3.8 - 5.2 10e12/L    Hemoglobin      11.7 - 15.7 g/dL    Hematocrit      35.0 - 47.0 %    MCV      78 - 100 fl    MCH      26.5 - 33.0 pg    MCHC      31.5 - 36.5 g/dL    RDW      10.0 - 15.0 %    Platelet Count      150 - 450 10e9/L    Diff Method          % Neutrophils      %    % Lymphocytes      %    % Monocytes      %    % Eosinophils      %    % Basophils      %    % Immature Granulocytes      %    Nucleated RBCs      0 /100    Absolute Neutrophil      1.6 - 8.3 10e9/L    Absolute Lymphocytes      0.8 - 5.3 10e9/L    Absolute Monocytes      0.0 - 1.3 10e9/L    Absolute Eosinophils      0.0 - 0.7 10e9/L    Absolute Basophils      0.0 - 0.2 10e9/L    Abs Immature Granulocytes      0 - 0.4 10e9/L    Absolute Nucleated RBC           IGG      610 - 1,616 mg/dL    IGA      84 - 499 mg/dL    IGM      35 - 242 mg/dL    Creatinine      0.52 - 1.04 mg/dL    GFR Estimate      >60 mL/min/1.73:m2    GFR Estimate If Black      >60 mL/min/1.73:m2    COVID-19 Virus PCR to [facility] - Source       Nasopharyngeal   COVID-19 Virus PCR to [facility] - Result       Not Detected   Neutrophil Cytoplasmic Antibody      <1:10 titer    Neutrophil Cytoplasmic Antibody Pattern          CD19 B Cells      6 - 27 %    Absolute CD19      107 - 698 cells/uL    Myeloperoxidase Antibody IgG      0.0 - 0.9 AI    Proteinase 3 Antibody IgG      0.0 - 0.9 AI    Vitamin D Deficiency screening      20 - 75 ug/L    Sed Rate      0 - 30 mm/h    CRP Inflammation      0.0 - 8.0 mg/L    Albumin      3.4 - 5.0 g/dL    ALT      0 - 50 U/L    AST      0 - 45 U/L      Component      Latest Ref Rng & Units 5/7/2021   WBC      4.0 - 11.0 10e9/L 8.9   RBC Count      3.8 - 5.2 10e12/L 4.89   Hemoglobin      11.7 - 15.7 g/dL 12.7   Hematocrit      35.0 - 47.0 % 39.7   MCV      78 - 100 fl 81   MCH      26.5 - 33.0 pg 26.0 (L)   MCHC      31.5 - 36.5 g/dL 32.0   RDW      10.0 - 15.0 % 13.7   Platelet Count      150 - 450 10e9/L 336   Diff Method       Automated Method   % Neutrophils      % 65.3   % Lymphocytes      % 20.7   % Monocytes      % 7.8   % Eosinophils      % 5.3   % Basophils      % 0.7   % Immature Granulocytes      % 0.2   Nucleated RBCs      0 /100 0   Absolute Neutrophil      1.6 - 8.3 10e9/L 5.8   Absolute Lymphocytes      0.8 - 5.3 10e9/L 1.8   Absolute Monocytes      0.0 - 1.3 10e9/L 0.7   Absolute Eosinophils      0.0 - 0.7 10e9/L 0.5   Absolute Basophils      0.0 - 0.2 10e9/L 0.1   Abs Immature Granulocytes      0 - 0.4 10e9/L 0.0   Absolute Nucleated RBC       0.0   IGG      610 - 1,616 mg/dL 649   IGA      84 - 499 mg/dL 199   IGM      35 - 242 mg/dL 36   Creatinine      0.52 - 1.04 mg/dL 0.96   GFR Estimate      >60 mL/min/1.73:m2 66   GFR Estimate If Black      >60  mL/min/1.73:m2 77   COVID-19 Virus PCR to [facility] - Source          COVID-19 Virus PCR to [facility] - Result          Neutrophil Cytoplasmic Antibody      <1:10 titer <1:10   Neutrophil Cytoplasmic Antibody Pattern       The ANCA IFA is <1:10.  No further testing will be performed.   CD19 B Cells      6 - 27 % <1 (L)   Absolute CD19      107 - 698 cells/uL <1 (L)   Myeloperoxidase Antibody IgG      0.0 - 0.9 AI 1.1 (H)   Proteinase 3 Antibody IgG      0.0 - 0.9 AI <0.2   Vitamin D Deficiency screening      20 - 75 ug/L 41   Sed Rate      0 - 30 mm/h 9   CRP Inflammation      0.0 - 8.0 mg/L <2.9   Albumin      3.4 - 5.0 g/dL 4.1   ALT      0 - 50 U/L 28   AST      0 - 45 U/L 18     Lab on 07/08/2022   Component Date Value Ref Range Status    Sodium 07/08/2022 143  133 - 144 mmol/L Final    Potassium 07/08/2022 3.9  3.4 - 5.3 mmol/L Final    Chloride 07/08/2022 108  94 - 109 mmol/L Final    Carbon Dioxide (CO2) 07/08/2022 25  20 - 32 mmol/L Final    Anion Gap 07/08/2022 10  3 - 14 mmol/L Final    Urea Nitrogen 07/08/2022 17  7 - 30 mg/dL Final    Creatinine 07/08/2022 1.01  0.52 - 1.04 mg/dL Final    Calcium 07/08/2022 9.3  8.5 - 10.1 mg/dL Final    Glucose 07/08/2022 103 (A) 70 - 99 mg/dL Final    GFR Estimate 07/08/2022 65  >60 mL/min/1.73m2 Final    Effective December 21, 2021 eGFRcr in adults is calculated using the 2021 CKD-EPI creatinine equation which includes age and gender (Ryan et al., NEJ, DOI: 10.1056/OFYWes3875187)    WBC Count 07/08/2022 12.0 (A) 4.0 - 11.0 10e3/uL Final    RBC Count 07/08/2022 4.94  3.80 - 5.20 10e6/uL Final    Hemoglobin 07/08/2022 12.6  11.7 - 15.7 g/dL Final    Hematocrit 07/08/2022 39.6  35.0 - 47.0 % Final    MCV 07/08/2022 80  78 - 100 fL Final    MCH 07/08/2022 25.5 (A) 26.5 - 33.0 pg Final    MCHC 07/08/2022 31.8  31.5 - 36.5 g/dL Final    RDW 07/08/2022 13.6  10.0 - 15.0 % Final    Platelet Count 07/08/2022 350  150 - 450 10e3/uL Final    % Neutrophils 07/08/2022 66  % Final     % Lymphocytes 07/08/2022 22  % Final    % Monocytes 07/08/2022 9  % Final    % Eosinophils 07/08/2022 3  % Final    % Basophils 07/08/2022 0  % Final    % Immature Granulocytes 07/08/2022 0  % Final    NRBCs per 100 WBC 07/08/2022 0  <1 /100 Final    Absolute Neutrophils 07/08/2022 7.9  1.6 - 8.3 10e3/uL Final    Absolute Lymphocytes 07/08/2022 2.7  0.8 - 5.3 10e3/uL Final    Absolute Monocytes 07/08/2022 1.1  0.0 - 1.3 10e3/uL Final    Absolute Eosinophils 07/08/2022 0.3  0.0 - 0.7 10e3/uL Final    Absolute Basophils 07/08/2022 0.1  0.0 - 0.2 10e3/uL Final    Absolute Immature Granulocytes 07/08/2022 0.0  <=0.4 10e3/uL Final    Absolute NRBCs 07/08/2022 0.0  10e3/uL Final     Component      Latest Ref Rng & Units 8/19/2022   WBC      4.0 - 11.0 10e3/uL 12.6 (H)   RBC Count      3.80 - 5.20 10e6/uL 5.06   Hemoglobin      11.7 - 15.7 g/dL 12.8   Hematocrit      35.0 - 47.0 % 40.4   MCV      78 - 100 fL 80   MCH      26.5 - 33.0 pg 25.3 (L)   MCHC      31.5 - 36.5 g/dL 31.7   RDW      10.0 - 15.0 % 14.1   Platelet Count      150 - 450 10e3/uL 387   % Neutrophils      % 71   % Lymphocytes      % 20   % Monocytes      % 6   % Eosinophils      % 3   % Basophils      % 0   % Immature Granulocytes      % 0   NRBCs per 100 WBC      <1 /100 0   Absolute Neutrophils      1.6 - 8.3 10e3/uL 8.8 (H)   Absolute Lymphocytes      0.8 - 5.3 10e3/uL 2.5   Absolute Monocytes      0.0 - 1.3 10e3/uL 0.8   Absolute Eosinophils      0.0 - 0.7 10e3/uL 0.4   Absolute Basophils      0.0 - 0.2 10e3/uL 0.1   Absolute Immature Granulocytes      <=0.4 10e3/uL 0.1   Absolute NRBCs      10e3/uL 0.0   Color Urine      Colorless, Straw, Light Yellow, Yellow Straw   Appearance Urine      Clear Clear   Glucose Urine      Negative mg/dL 1000 (A)   Bilirubin Urine      Negative Negative   Ketones Urine      Negative mg/dL Negative   Specific Gravity Urine      1.003 - 1.035 1.020   Blood Urine      Negative Negative   pH Urine      5.0 - 7.0 5.0    Protein Albumin Urine      Negative mg/dL Negative   Urobilinogen mg/dL      Normal, 2.0 mg/dL Normal   Nitrite Urine      Negative Negative   Leukocyte Esterase Urine      Negative Negative   RBC Urine      <=2 /HPF 1   WBC Urine      <=5 /HPF 5   Squamous Epithelial /HPF Urine      <=1 /HPF <1   MPO Marline IgG Instrument Value      <3.5 U/mL 0.7   Myeloperoxidase Antibody IgG      Negative Negative   Proteinase 3 Marline IgG Instrument Value      <2.0 U/mL <1.0   Proteinase 3 Antibody IgG      Negative Negative   Protein Random Urine      g/L 0.11   Protein Total Urine g/gr Creatinine      0.00 - 0.20 g/g Cr 0.09   Creatinine Urine      mg/dL 128   IGG      610 - 1,616 mg/dL 641   IGA      84 - 499 mg/dL 204   IGM      35 - 242 mg/dL 32 (L)   Creatinine      0.52 - 1.04 mg/dL 1.24 (H)   GFR Estimate      >60 mL/min/1.73m2 51 (L)   CD19 B Cells      6 - 27 % <1 (L)   Absolute CD19      107 - 698 cells/uL <1 (L)   Neutrophil Cytoplasmic Antibody      <1:10 <1:10   Neutrophil Cytoplasmic Antibody Pattern       The ANCA IFA is <1:10.  No further testing will be performed.   AST      0 - 45 U/L 16   ALT      0 - 50 U/L 34   Albumin      3.4 - 5.0 g/dL 4.2   CRP Inflammation      0.0 - 8.0 mg/L 9.1 (H)   Sed Rate      0 - 30 mm/hr 15   Vitamin D Deficiency screening      20 - 75 ug/L 36     Component      Latest Ref Rng & Units 1/19/2023   WBC      4.0 - 11.0 10e3/uL 16.1 (H)   RBC Count      3.80 - 5.20 10e6/uL 5.39 (H)   Hemoglobin      11.7 - 15.7 g/dL 13.4   Hematocrit      35.0 - 47.0 % 41.9   MCV      78 - 100 fL 78   MCH      26.5 - 33.0 pg 24.9 (L)   MCHC      31.5 - 36.5 g/dL 32.0   RDW      10.0 - 15.0 % 15.4 (H)   Platelet Count      150 - 450 10e3/uL 383   % Neutrophils      % 79   % Lymphocytes      % 16   % Monocytes      % 4   % Eosinophils      % 1   % Basophils      % 0   % Immature Granulocytes      % 0   NRBCs per 100 WBC      <1 /100 0   Absolute Neutrophils      1.6 - 8.3 10e3/uL 12.6 (H)   Absolute  Lymphocytes      0.8 - 5.3 10e3/uL 2.6   Absolute Monocytes      0.0 - 1.3 10e3/uL 0.7   Absolute Eosinophils      0.0 - 0.7 10e3/uL 0.2   Absolute Basophils      0.0 - 0.2 10e3/uL 0.1   Absolute Immature Granulocytes      <=0.4 10e3/uL 0.1   Absolute NRBCs      10e3/uL 0.0   Sodium      136 - 145 mmol/L 137   Potassium      3.4 - 5.3 mmol/L 4.0   Chloride      98 - 107 mmol/L 98   Carbon Dioxide (CO2)      22 - 29 mmol/L 25   Anion Gap      7 - 15 mmol/L 14   Urea Nitrogen      6.0 - 20.0 mg/dL 23.6 (H)   Creatinine      0.51 - 0.95 mg/dL 1.09 (H)   Calcium      8.6 - 10.0 mg/dL 10.3 (H)   Glucose      70 - 99 mg/dL 232 (H)   Alkaline Phosphatase      35 - 104 U/L 95   AST      10 - 35 U/L 23   ALT      10 - 35 U/L 26   Protein Total      6.4 - 8.3 g/dL 7.7   Albumin      3.5 - 5.2 g/dL 4.9   Bilirubin Total      <=1.2 mg/dL 0.3   GFR Estimate      >60 mL/min/1.73m2 59 (L)   Color Urine      Colorless, Straw, Light Yellow, Yellow Light Yellow   Appearance Urine      Clear Clear   Glucose Urine      Negative mg/dL >=1000 (A)   Bilirubin Urine      Negative Negative   Ketones Urine      Negative mg/dL 10 (A)   Specific Gravity Urine      1.003 - 1.035 1.033   Blood Urine      Negative Negative   pH Urine      5.0 - 7.0 6.0   Protein Albumin Urine      Negative mg/dL Negative   Urobilinogen mg/dL      Normal, 2.0 mg/dL Normal   Nitrite Urine      Negative Negative   Leukocyte Esterase Urine      Negative Negative   Iron      37 - 145 ug/dL 51   Iron Binding Capacity      240 - 430 ug/dL 362   Iron Sat Index      15 - 46 % 14 (L)   Parathyroid Hormone Intact      15 - 65 pg/mL 51   Calcium Ionized Whole Blood      4.4 - 5.2 mg/dL 4.9   Ferritin      11 - 328 ng/mL 70   TSH      0.30 - 4.20 uIU/mL 0.40   3 views cervical spine radiographs 2/10/2023 4:10 PM     History: neck pain; Neck pain     Comparison: MRI cervical spine 4/14/2015.     Findings:  AP, lateral, and odontoid views of the cervical spine were obtained.      Down to the level of C7 is well visualized on the lateral view(s). The  cervicothoracic junction is partially visualized.     There is no acute osseous abnormality. No prevertebral soft tissue  swelling. Normal cervical lordosis is maintained.       Moderate loss of intervertebral disc height at C6-7. Mild loss of disc  height at C5-6. Additional multilevel degenerative changes including  endplate osteophytosis and uncinate hypertrophy. Dens is obscured by  the overlying maxilla on the odontoid view.     The visualized lung apices are clear.                                                                      Impression:  Multilevel cervical degenerative changes, greatest at C6-7.     I have personally reviewed the examination and initial interpretation  and I agree with the findings.     TASHI KELLY MD      Component      Latest Ref Rng & Units 2/10/2023   WBC      4.0 - 11.0 10e3/uL 11.9 (H)   RBC Count      3.80 - 5.20 10e6/uL 5.30 (H)   Hemoglobin      11.7 - 15.7 g/dL 13.2   Hematocrit      35.0 - 47.0 % 40.7   MCV      78 - 100 fL 77 (L)   MCH      26.5 - 33.0 pg 24.9 (L)   MCHC      31.5 - 36.5 g/dL 32.4   RDW      10.0 - 15.0 % 15.3 (H)   Platelet Count      150 - 450 10e3/uL 415   % Neutrophils      % 69   % Lymphocytes      % 23   % Monocytes      % 6   % Eosinophils      % 2   % Basophils      % 0   % Immature Granulocytes      % 0   NRBCs per 100 WBC      <1 /100 0   Absolute Neutrophils      1.6 - 8.3 10e3/uL 8.1   Absolute Lymphocytes      0.8 - 5.3 10e3/uL 2.7   Absolute Monocytes      0.0 - 1.3 10e3/uL 0.8   Absolute Eosinophils      0.0 - 0.7 10e3/uL 0.2   Absolute Basophils      0.0 - 0.2 10e3/uL 0.0   Absolute Immature Granulocytes      <=0.4 10e3/uL 0.0   Absolute NRBCs      10e3/uL 0.0   Sodium      136 - 145 mmol/L 138   Potassium      3.4 - 5.3 mmol/L 4.1   Chloride      98 - 107 mmol/L 99   Carbon Dioxide (CO2)      22 - 29 mmol/L 23   Anion Gap      7 - 15 mmol/L 16 (H)   Urea  Nitrogen      6.0 - 20.0 mg/dL 24.6 (H)   Creatinine      0.51 - 0.95 mg/dL 1.07 (H)   Calcium      8.6 - 10.0 mg/dL 10.5 (H)   Glucose      70 - 99 mg/dL 205 (H)   Alkaline Phosphatase      35 - 104 U/L 86   AST      10 - 35 U/L 26   ALT      10 - 35 U/L 30   Protein Total      6.4 - 8.3 g/dL 7.6   Albumin      3.5 - 5.2 g/dL 4.8   Bilirubin Total      <=1.2 mg/dL 0.2   GFR Estimate      >60 mL/min/1.73m2 61   MPO Marline IgG Instrument Value      <3.5 U/mL 0.8   Myeloperoxidase Antibody IgG      Negative Negative   Proteinase 3 Marline IgG Instrument Value      <2.0 U/mL <1.0   Proteinase 3 Antibody IgG      Negative Negative   IGG      610 - 1,616 mg/dL 680   IGA      84 - 499 mg/dL 197   IGM      35 - 242 mg/dL 30 (L)   CD19 B Cells      6 - 27 % <1 (L)   Absolute CD19      107 - 698 cells/uL <1 (L)   Neutrophil Cytoplasmic Antibody      <1:10 <1:10   Neutrophil Cytoplasmic Antibody Pattern       The ANCA IFA is <1:10.  No further testing will be performed.   % Retic      0.5 - 2.0 % 1.4   Absolute Retic      0.025 - 0.095 10e6/uL 0.073   CRP Inflammation      <5.00 mg/L 6.67 (H)   Sed Rate      0 - 30 mm/hr 12   Lipase      13 - 60 U/L 26     ROS: A comprehensive ROS was done. Positives are per HPI.      HISTORY REVIEW:  Past Medical History:   Diagnosis Date    Abnormal glandular Papanicolaou smear of cervix 1992    Allergic rhinitis     Allergy, airborne subst    Arthritis     ASCVD (arteriosclerotic cardiovascular disease)     Chronic pain     Chronic pancreatitis (H)     idiopathic, Tx: PPI, antioxidants, pancreatic enzymes    Common migraine     Coronary artery disease     Costochondritis     Difficulty in walking(719.7)     Dyspnea on exertion     Ectasia, mammary duct     [facility], persistent nipple discharge    Elevated fasting glucose     Gastro-oesophageal reflux disease     Granulomatosis with polyangiitis (H)     Hernia     History of angina     Hyperlipidemia LDL goal < 100      Hypertension goal BP (blood pressure) < 140/90     Essential hypertension    Iron deficiency anemia     Ischemic cardiomyopathy     Menorrhagia     Migraine headaches     Mild persistent asthma     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 Matias, neg w/u, Rx'd w/prednisone    Ventral hernia, unspecified, without mention of obstruction or gangrene 2012     Past Surgical History:   Procedure Laterality Date    C/SECTION, LOW TRANSVERSE  1996        CARDIAC SURGERY      cardiac stent- recent in 16; 4 other stents    COLONOSCOPY N/A 2023    Procedure: COLONOSCOPY, WITH POLYPECTOMY;  Surgeon: Percy Gross MD;  Location: [facility] OR    DILATION AND CURETTAGE N/A 2016    Procedure: DILATION AND CURETTAGE;  Surgeon: Nahed Butler MD;  [facility]    ENDOMETRIAL SAMPLING (BIOPSY) N/A 2023    Procedure: ENDOMETRIAL BIOPSY;  Surgeon: Nahed Butler MD;  [facility]    ESOPHAGOSCOPY, GASTROSCOPY, DUODENOSCOPY (EGD), COMBINED N/A 2022    Procedure: ESOPHAGOGASTRODUODENOSCOPY, WITH BIOPSY;  Surgeon: Enzo Sesay MD;  [facility]: [facility]    ESOPHAGOSCOPY, GASTROSCOPY, DUODENOSCOPY (EGD), COMBINED N/A 2023    Procedure: ESOPHAGOGASTRODUODENOSCOPY, WITH BIOPSY;  Surgeon: Percy Gross MD;  Location:  [facility] OR    EXAM UNDER ANESTHESIA PELVIC N/A 2023    Procedure: EXAM UNDER ANESTHESIA;  Surgeon: Nahed Butler MD;  [facility]    HC UGI ENDOSCOPY W EUS  2008    Dr. Pastrana, possible chronic pancreatitis, fatty liver    HERNIA REPAIR  2012    done at [facility]    INSERT INTRAUTERINE DEVICE N/A 2016    Procedure: INSERT INTRAUTERINE DEVICE;  Surgeon: Nahed Butler MD;  [facility]    INT UTERINE FIBRIOD EMBOLIZATION  10/29/2014    LAPAROSCOPIC CHOLECYSTECTOMY  2008    Dr. Arnol GRUBBS TX, CERVICAL  1992    s/p LEERAHUL, done at [facility] in [place].    ORBITOTOMY Right 03/15/2016    Procedure: ORBITOTOMY;  Surgeon: Myron Cyr MD;  Location:  [facility]    ORBITOTOMY Right 2017    Procedure: ORBITOTOMY;  RIGHT ORBITOTOMY AND BIOPSY;  Surgeon: Charis Holbrook MD;  Location: [facility]    REMOVE INTRAUTERINE DEVICE N/A 2023    Procedure: Remove intrauterine device,;  Surgeon: Nahed Butler MD;  [facility]    REPAIR PTOSIS Right 2017    Procedure: REPAIR PTOSIS;  RIGHT UPPER LID PTOSIS REPAIR;  Surgeon: Myron Cyr MD;  Location: [facility]    UPPER GI ENDOSCOPY  10/21/2008    mild gastritis, Dr. Rocky [facility]A ECHO HEART XTHORACIC,COMPLETE, W/O DOPPLER  2004    [facility] WNL, EF 60%     Family History   Problem Relation Age of Onset    Hypertension Mother     Arthritis Mother     Heart Disease Mother         long qt syndrome    Thyroid Disease Mother     C.A.D. Mother     Heart Disease Father 50        heart attack    Cerebrovascular Disease Father     Diabetes Father     Hypertension Father     Depression Father     C.A.D. Father     Neurologic Disorder Sister         migraines    Neurologic Disorder Sister         migraines    Heart Disease Brother 15        MI at 15, 16.     Arthritis Brother         he passed away, had severe arthritis at age 11    C.A.D. Brother     Anesthesia Reaction Son         PONV     Respiratory Son         asthma    Hypertension Maternal Aunt     Diabetes Maternal Uncle     Hypertension Maternal Uncle     Arthritis Maternal Uncle     Eye Disorder Maternal Uncle         cataracts    Cerebrovascular Disease Maternal Uncle     Family History Negative Other         neg for RA, SLE    Unknown/Adopted No family hx of         unknown neurological issues in her family, mother was adopted    Skin Cancer No family hx of         No known family hx of skin cancer    Deep Vein Thrombosis (DVT) No family hx of      Social History     Socioeconomic History    Marital status: Single     Spouse name: Not on file    Number of children: 1    Years of education: Not on file    Highest education level: Not on file   Occupational History     Employer: NONE    Tobacco Use    Smoking status: Some Days     Current packs/day: 0.00     Average packs/day: 0.2 packs/day for 1 year (0.2 ttl pk-yrs)     Types: Cigarettes     Start date: 2015     Last attempt to quit: 2016     Years since quittin.3    Smokeless tobacco: Never   Vaping Use    Vaping status: Every Day    Substances: Nicotine   Substance and Sexual Activity    Alcohol use: No     Alcohol/week: 0.0 standard drinks of alcohol    Drug use: No    Sexual activity: Not Currently   Other Topics Concern    Parent/sibling w/ CABG, MI or angioplasty before 65F 55M? Yes   Social History Narrative    Balanced Diet - sometimes    Osteoporosis Prevention Measures - Dairy servings per day: 2 servings weekly    Regular Exercise -  Yes Describe walking 4 times a week    Dental Exam - NO    Seatbelts used - Yes    Self Breast Exam - Yes    Abuse: Current or Past (Physical, Sexual or Emotional)- No    Do you have any concerns about STD's -  No    Do you feel safe in your environment - No    Guns stored in the home - No    Sunscreen used - Yes    Lipids -  YES - Date:     Glucose -  YES - Date:     Eye Exam - YES - Date: one year ago    Colon Cancer  Screening - No    Hemoccults - NO    Pap Test -  YES - Date: 070904, remote history of LEEP    Mammography - YES - Date: last spring, would like to discuss, needs a referral to [facility]    DEXA - NO    Immunizations reviewed and up to date - Yes, last td given in 1997 or 1998     Social Drivers of Health     Financial Resource Strain: Low Risk  (2/9/2024)    Financial Resource Strain     Within the past 12 months, have you or your family members you live with been unable to get utilities (heat, electricity) when it was really needed?: No   Food Insecurity: High Risk (2/9/2024)    Food Insecurity     Within the past 12 months, did you worry that your food would run out before you got money to buy more?: Yes     Within the past 12 months, did the food you bought just not last and you didn t have money to get more?: No   Transportation Needs: Low Risk  (2/9/2024)    Transportation Needs     Within the past 12 months, has lack of transportation kept you from medical appointments, getting your medicines, non-medical meetings or appointments, work, or from getting things that you need?: No   Physical Activity: Not on file   Stress: Not on file   Social Connections: Not on file   Interpersonal Safety: Low Risk  (4/9/2025)    Interpersonal Safety     Do you feel physically and emotionally safe where you currently live?: Yes     Within the past 12 months, have you been hit, slapped, kicked or otherwise physically hurt by someone?: No     Within the past 12 months, have you been humiliated or emotionally abused in other ways by your partner or ex-partner?: No   Housing Stability: Low Risk  (2/9/2024)    Housing Stability     Do you have housing? : Yes     Are you worried about losing your housing?: No     Patient Active Problem List   Diagnosis    Seasonal affective disorder    Allergic rhinitis    PCOS (polycystic ovarian syndrome)    Moderate persistent asthma    Chronic pancreatitis (H)    Hypertension  goal BP (blood pressure) < 140/90    Common migraine    NSTEMI (non-ST elevated myocardial infarction) (H)    Hyperlipidemia LDL goal <70    ASCVD (arteriosclerotic cardiovascular disease)    GERD (gastroesophageal reflux disease)    Ischemic cardiomyopathy    Hypertensive heart disease    Uterine leiomyoma    Iron deficiency anemia    Costochondritis    Vitamin D deficiency    Breast cancer screening    Spinal stenosis in cervical region    Fibromyalgia    Seronegative rheumatoid arthritis (H)    Type 2 diabetes, HbA1C goal < 8% (H)    Type 2 diabetes mellitus with other specified complication (H)    Hyperlipidemia associated with type 2 diabetes mellitus (H)    Hypertension associated with diabetes (H)    Overweight with body mass index (BMI) 25.0-29.9    Tobacco use disorder    Telogen effluvium    CAD S/P percutaneous coronary angioplasty    Status post coronary angiogram    ANCA-associated vasculitis (H)    Wegener's vasculitis    Type 1 diabetes mellitus with complications (H)    Chest discomfort    Urinary frequency    Abdominal pain, epigastric    Hypokalemia    Leukocytosis, unspecified type    Acute pancreatitis, unspecified complication status, unspecified pancreatitis type       Pregnancy Hx: She is . All miscarriages were in first trimester. H/o OC use in the past. 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    PMHx, FHx, SHx were reviewed, unchanged.    Outpatient Encounter Medications as of 2025   Medication Sig Dispense Refill    acetaminophen (TYLENOL) 325 MG tablet Take 1-2 tablets (325-650 mg) by mouth every 6 hours as needed for pain (headache) 250 tablet 4    ADVAIR DISKUS 250-50 MCG/ACT inhaler Inhale 1 puff into the lungs 2 times daily      albuterol (2.5 MG/3ML) 0.083% neb solution INL 1 VIAL VIA NEBULIZATION Q 4 TO 6 HOURS PRN  1    albuterol (PROAIR HFA, PROVENTIL HFA, VENTOLIN HFA) 108 (90 BASE) MCG/ACT inhaler Inhale 2 puffs into the lungs every 6 hours as needed  for shortness of breath / dyspnea or wheezing 3 Inhaler 1    BANOPHEN 25 MG tablet Take 25 mg by mouth At Bedtime      BD PEN NEEDLE MARCK 2ND GEN 32G X 4 MM miscellaneous USE ONE PEN NEEDLE DAILY OR AS DIRECTED 100 each 2    blood glucose (NO BRAND SPECIFIED) lancets standard Use to test blood sugar 1-4 times daily or as directed. 400 each 3    blood glucose (NO BRAND SPECIFIED) test strip Use to test blood sugar 1-4 times daily or as directed 400 strip 3    blood glucose monitoring (NO BRAND SPECIFIED) meter device kit Use to test blood sugar 1-4 times daily or as directed. 1 kit 0    Blood Pressure Monitor KIT 1 each daily Monitor home blood pressure as instructed by physician.  Dispense [place] blood pressure kit. 1 kit 0    calcium carbonate (TUMS) 500 MG chewable tablet Take 3-4 tablets (1,500-2,000 mg) by mouth daily as needed 90 tablet 3    clopidogrel (PLAVIX) 75 MG tablet Take 1 tablet (75 mg) by mouth daily. . 30 tablet 3    clotrimazole (MYCELEX) 10 MG lozenge Place 1 lozenge (10 mg) inside cheek 5 times daily 50 lozenge 1    cyanocobalamin (VITAMIN B-12) 1000 MCG tablet Take 1 tablet by mouth daily at 2 pm      cyclobenzaprine (FLEXERIL) 10 MG tablet Take 1 tablet (10 mg) by mouth 2 times daily as needed for muscle spasms 60 tablet 3    cycloSPORINE (RESTASIS) 0.05 % ophthalmic emulsion 1 month supply 11 refills 1 each 11    dicyclomine (BENTYL) 20 MG tablet TAKE 1 TABLET(20 MG) BY MOUTH FOUR TIMES DAILY AS NEEDED. 60 tablet 4    empagliflozin (JARDIANCE) 25 MG TABS tablet Take 1 tablet (25 mg) by mouth daily. Follow up visit needed. Call  to schedule. 90 tablet 0    EPIPEN 2-RIKY 0.3 MG/0.3ML injection INJECT 0.3 MG INTO THE MUSCLE PRF ANAPHYALAXIS  0    esomeprazole (NEXIUM) 40 MG DR capsule Take 1 capsule (40 mg) by mouth 2 times daily Take 30-60 minutes before eating. 180 capsule 0    fluticasone (FLONASE) 50 MCG/ACT nasal spray Spray 1 spray in nostril as needed      folic acid (FOLVITE) 1  MG tablet TAKE 1 TABLET BY MOUTH EVERY DAY 90 tablet 0    insulin glargine (LANTUS PEN) 100 UNIT/ML pen Inject 56 Units Subcutaneous every morning Or as directed. 75 mL 1    lisinopril-hydrochlorothiazide (ZESTORETIC) 20-25 MG tablet Take 1 tablet by mouth daily. 30 tablet 3    magnesium 250 MG tablet TAKE 1 TABLET(250 MG) BY MOUTH TWICE DAILY 60 tablet 3    Magnesium Oxide 250 MG tablet Take 1 tablet (250 mg) by mouth 2 times daily. 60 tablet 1    metFORMIN (GLUCOPHAGE XR) 500 MG 24 hr tablet TAKE 4 TABLETS BY MOUTH DAILY WITH DINNER 360 tablet 1    metoprolol succinate ER (TOPROL XL) 100 MG 24 hr tablet Take 1 tablet (100 mg) by mouth daily. 30 tablet 3    Multiple Vitamin (DAILY-GERALD MULTIVITAMIN) TABS TAKE 1 TABLET BY MOUTH EVERY  tablet 3    nitroGLYcerin (NITROSTAT) 0.4 MG sublingual tablet FOR CHEST PAIN PLACE 1 TABLET UNDER THE TONGUE EVERY 5 MINUTES FOR 3 DOSES. IF SYMPTOMS PERSIST 5 MINUTES AFTER 1ST DOSE CALL 911. 25 tablet 11    olopatadine (PATANOL) 0.1 % ophthalmic solution Place 1 drop into both eyes as needed      ondansetron (ZOFRAN ODT) 8 MG ODT tab TAKE 1 TABLET BY MOUTH EVERY 8 HOURS AS NEEDED FOR NAUSEA 20 tablet 1    pravastatin (PRAVACHOL) 40 MG tablet TAKE 1 TABLET BY MOUTH EVERY DAY 30 tablet 3    pregabalin (LYRICA) 25 MG capsule Take 2 capsules (50 mg) by mouth daily. 180 capsule 1    sennosides (SENOKOT) 8.6 MG tablet 1-2 tabs a day as needed for constipation 60 tablet 1    spironolactone (ALDACTONE) 25 MG tablet TAKE 1 TABLET (25 MG) BY MOUTH DAILY. MAY ALSO TAKE 0.5 TABLETS (12.5 MG) DAILY AS NEEDED (FOR WEIGHT GAIN). 45 tablet 3    sucralfate (CARAFATE) 1 GM tablet Take 1 tablet (1 g) by mouth 4 times daily 120 tablet 3    traMADol (ULTRAM) 50 MG tablet TAKE 1 TABLET(50 MG) BY MOUTH EVERY 8 HOURS AS NEEDED FOR SEVERE PAIN 60 tablet 3    YUVAFEM 10 MCG TABS vaginal tablet PLACE 1 TABLET (10 MCG) VAGINALLY TWICE A WEEK 24 tablet 1     No facility-administered encounter medications  on file as of 5/23/2025.       Allergies   Allergen Reactions    Amoxicillin-Pot Clavulanate      Unknown possible hives and edema    Amoxicillin-Pot Clavulanate     Artificial Sweetner (Informational Only)  Other (See Comments)     Increased headache    Azithromycin     Clavulanic Acid     Diatrizoate Other (See Comments)     Pt wants this listed because she is allergic to shellfish     Imitrex [Triptans]      Severe face/neck/chest tightness and flushing side effects     Penicillins Hives     Unknown     Pork Allergy      Stomach pain, cramping, diarrhea, itching, nausea and headaches    Shellfish Allergy Hives and Swelling    Shellfish-Derived Products     Sulfa Antibiotics Hives and Swelling    Sulfatolamide     Zithromax [Azithromycin Dihydrate] Swelling     Unknown        Ph.E:    BP (!) 144/67   Pulse 76   Wt 71.2 kg (157 lb)   LMP  (LMP Unknown)   BMI 28.24 kg/m      GA: NAD, pleasant  HEENT: nl conj, sclera, EOMI. no campos-orbital edema under R eye  Neck; no cervical LAP  Chest: CTAB  CV: no M/R/G, RRR  Abdomen: soft, NT  MSK: no synovitis or joint tenderness  Skin: no rash, thin hair  Neuro: non focal  Psych: nl affect    Assessment/Plan:    1-Seropositive non-erosive RA (arthritis, AM stiffness, high CRP, RF 26 but re-check neg 3/2015 on HC[facility]) Dx 5/2013, FMS, new pleuritic CP 3/20-15-5/2015 resolved on steroids. She is on  mg bid since 5/2014. She tolerates it well and it's helping some but not enough to control all her pain. Continued having flare ups of joint pain, could be GPA related. Some pain is due to FMS.My impression is that her arthralgias are a combination of RA/ IA sec GPA, fibromyalgia and chronic pain.     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 Prednisone was not a good option for her given weight gain,  diabetes. She tried and did not tolerate MTX, AZA.    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 Repeat MPO was positive in 6/2017.    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 Shah and Dr. Valdez at [facility]. 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     She is on rituximab since 12/12/2018 with great response, minor allergic reaction which could be managed by pre-meds and extra steroid dose. Developed high BG and vaginal yeast infections after solumedrol premed. Treated candida with fluconazole. Will decrease solumerdol dose to IV 80 mg prior to receiving rituximab. 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 However, feels Rituximab effects wear off around 4 months. According to ACR guidelines can give every 4-6 months. Pt elected to received this upcoming September which will be approximately 5 months from last dose. Repeat Orbit CT in September 2021 demonstrated improvement.     Last visit in April 2022-- Recommended evusheld given b cell depletion tx as rituximab blocks the response to covid vaccine, she is concerned about side effects. I advised her to discuss with [facility] pharmacist.      1/25/2023: Janine has been ill since Dx of COVID on 12/17/2022. Her most recent labs were reviewed, stable vasculitis labs in 8/2022 with CD19<1, neg vasculitis markers. In 1/2023, no anemia and nl thyroid function test. I ordered vit D as add on. 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 She does need to follow up on abnormal thyroid US and mammogram as well. Our plan for ANCA vasculitis was to give rituximab 1 gram [id number] as benefit did not last 6 months with last rituximab on 9/7/2022, but today we both agreed to give next dose in March after 6 months to let her body heal from COVID. I will see her in person, in early  march to determine if it is ok to give another dose of rituxiamb. 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 Also ordered shower chair, roseanna per her request given significant fatigue, body pain.    3/9/2023: S/p last rituximab 1 gram on 9/7/2022. 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 Stable labs and eye inflammation.    2-Fat pads. She was seen by endocrinology and cushing was ruled out in 4/2014. Was advised to do f/u for enlarged thyroid/thyroid nodules.  3-Hair loss. Continue with dermatology  4-Chronic pain. F/U with pain clinic  5-Vit D deficiency. Was replaced with vit D 50, 000 units po qwk x 12 wk then 2000 units every day  6-?HCQ toxicity on OCT 7/2021, now off HCQ, could explain increased arthralgia  7- Chronic Headaches. Symptoms worsen after taking zofran. Has received compazine in hospital in the past without adverse effect. Will have patient trial Compazine       3/2023 plan:    Spine referral for abnormal C spine (DJD) in 2/2023.    Rituximab 1 gram one dose only this month    Labs in May 2023    Follow up eye exam 8/2023    Return in person in about 5-6 months       9/6/2023: last rituximab 1 gram one dose for ANCA vasculitis on 3/28/2023, increased arthralgia, will try rituximab at full dose, zanaflex and consider resuming HCQ as last eye exam did rule out HCQ toxicity (HCQ was stopped with concern for possible HCQ toxicity).    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 Will schedule rituximab in Feb, pain mx was discussed. Labs are stable.    S/p rituximab 1 gram on 9/19/2023, 10/3/2023.    Plan:      Rituximab 1 gram every 2 weeks x 2 infusion in Feb for ANCA vasculitis    Labs with next rituximab infusion    X-rays today    Acupuncture referral    Return in person in about 4 months      6/5/2024: more joint pain, rituximab does not last 6 months, will move up appointment. Will check orbit CT. Stable labs. I asked her PCP to help with pain mx.    Labs today    Move up rituximab 1 gram once from Aug to June 2024, our pharmacist will contact you    CT orbit with no contrast    Return in about 3-4 months in person      10/11/2024:    GPA is stable on rituximab q6 mo, suspect knee pain to be from OA, pain sx to be from neuropathy. Discussed mx, going up on lyrica,s he is concerned about SE like weight gain, but willing to try increasing a little bit. Recommend follow up with PCP for pain mx, acupuncture and LDN could be good options.    Plan:    Rituximab 1 gram every 2 weeks in March 2025       Refer to ortho, knee specialist      Go up on lyrica to 50 mg a day      Return in about 4-5 months (in person)        1/23/2025:    Stable GPA on rituximab, pain seems to be neuropathy related, failed lyrica. She wishes to do follow up with PCP for pain mx.    Plan:    Rituximab 1 gram q2wk x 2 in March    Labs with rituximab    Follow up with Dr. Solano for pain management, consider LDN, TENS unit is a good idea    Ok to come off lyrica, bt taper it off slowly    Return in person in about 4-5 months      Today 5/23/2025:    Stable GPA, rituximab has been helpful to keep GPA under control. Pain is most likely due to neuropathy (DM and GPA both could cause neuropathy), will ask PCP to help with pain mx, consider LDN. She would like to re-try ketoprofen compound, tried it years ago.    S/p rituximab 1gram on 3/27, 4/10/2025.    Plan:    Rituximab 1 gram  every 2 weeks x 2 in early September    Labs in July 2025 then every 3 months    Try ketoprofen 5% cream, 2-4 times a day over your joints    Return in 6 months in person    TT 40 min was spent on date of the encounter doing chart review, history and exam, documentation and further activities as noted above. Any prior notes, outside records, laboratory results, and imaging studies were reviewed if relevant.      The longitudinal plan of care for the diagnosis(es)/condition(s) as documented were addressed during this visit. Due to the added complexity in care, I will continue to support Janine in the subsequent management and with ongoing continuity of care.      Orders Placed This Encounter   Procedures    AST    ALT    Myeloperoxidase and Proteinase 3 Panel    Albumin level    Creatinine    CRP inflammation    UA with Microscopic reflex to Culture    Protein  random urine    Creatinine random urine    Erythrocyte sedimentation rate auto    CD19 B Cell Count    ANCA IgG by IFA with Reflex to Titer    Immunoglobulins A G and M    CBC with Platelets & Differential        Majo Mckinnon MD

## 2025-05-27 DIAGNOSIS — M31.30 GRANULOMATOSIS WITH POLYANGIITIS WITHOUT RENAL INVOLVEMENT (H): ICD-10-CM

## 2025-05-27 DIAGNOSIS — I77.82 ANCA-ASSOCIATED VASCULITIS (H): Primary | ICD-10-CM

## 2025-05-27 RX ORDER — HYDROCORTISONE SODIUM SUCCINATE 100 MG/2ML
100 INJECTION INTRAMUSCULAR; INTRAVENOUS ONCE
Start: 2025-09-01 | End: 2025-09-01

## 2025-05-27 RX ORDER — ALBUTEROL SULFATE 90 UG/1
1-2 INHALANT RESPIRATORY (INHALATION)
Start: 2025-09-01

## 2025-05-27 RX ORDER — METHYLPREDNISOLONE SODIUM SUCCINATE 125 MG/2ML
80 INJECTION INTRAMUSCULAR; INTRAVENOUS ONCE
OUTPATIENT
Start: 2025-09-01

## 2025-05-27 RX ORDER — DIPHENHYDRAMINE HYDROCHLORIDE 50 MG/ML
25 INJECTION, SOLUTION INTRAMUSCULAR; INTRAVENOUS
Start: 2025-09-01

## 2025-05-27 RX ORDER — DIPHENHYDRAMINE HYDROCHLORIDE 50 MG/ML
50 INJECTION, SOLUTION INTRAMUSCULAR; INTRAVENOUS
Start: 2025-09-01

## 2025-05-27 RX ORDER — HEPARIN SODIUM (PORCINE) LOCK FLUSH IV SOLN 100 UNIT/ML 100 UNIT/ML
5 SOLUTION INTRAVENOUS
OUTPATIENT
Start: 2025-09-01

## 2025-05-27 RX ORDER — ACETAMINOPHEN 325 MG/1
650 TABLET ORAL ONCE
Start: 2025-09-01

## 2025-05-27 RX ORDER — EPINEPHRINE 1 MG/ML
0.3 INJECTION, SOLUTION, CONCENTRATE INTRAVENOUS EVERY 5 MIN PRN
OUTPATIENT
Start: 2025-09-01

## 2025-05-27 RX ORDER — METHYLPREDNISOLONE SODIUM SUCCINATE 40 MG/ML
40 INJECTION INTRAMUSCULAR; INTRAVENOUS
Start: 2025-09-01

## 2025-05-27 RX ORDER — ALBUTEROL SULFATE 0.83 MG/ML
2.5 SOLUTION RESPIRATORY (INHALATION)
OUTPATIENT
Start: 2025-09-01

## 2025-05-27 RX ORDER — DIPHENHYDRAMINE HCL 25 MG
50 CAPSULE ORAL ONCE
Start: 2025-09-01

## 2025-05-27 RX ORDER — HEPARIN SODIUM,PORCINE 10 UNIT/ML
5-20 VIAL (ML) INTRAVENOUS DAILY PRN
OUTPATIENT
Start: 2025-09-01

## 2025-05-28 ENCOUNTER — ANCILLARY PROCEDURE (OUTPATIENT)
Dept: MRI IMAGING | Facility: CLINIC | Age: 60
End: 2025-05-28
Attending: INTERNAL MEDICINE
Payer: COMMERCIAL

## 2025-05-28 DIAGNOSIS — K86.1 CHRONIC PANCREATITIS, UNSPECIFIED PANCREATITIS TYPE (H): ICD-10-CM

## 2025-05-28 DIAGNOSIS — K86.89 PANCREATIC MASS: ICD-10-CM

## 2025-05-28 PROCEDURE — A9585 GADOBUTROL INJECTION: HCPCS | Performed by: RADIOLOGY

## 2025-05-28 PROCEDURE — 74183 MRI ABD W/O CNTR FLWD CNTR: CPT | Performed by: RADIOLOGY

## 2025-05-28 RX ORDER — GADOBUTROL 604.72 MG/ML
7.5 INJECTION INTRAVENOUS ONCE
Status: COMPLETED | OUTPATIENT
Start: 2025-05-28 | End: 2025-05-28

## 2025-05-28 RX ADMIN — GADOBUTROL 7 ML: 604.72 INJECTION INTRAVENOUS at 18:12

## 2025-05-30 ENCOUNTER — RESULTS FOLLOW-UP (OUTPATIENT)
Dept: FAMILY MEDICINE | Facility: CLINIC | Age: 60
End: 2025-05-30

## 2025-06-02 ENCOUNTER — RESULTS FOLLOW-UP (OUTPATIENT)
Dept: GASTROENTEROLOGY | Facility: CLINIC | Age: 60
End: 2025-06-02
Payer: COMMERCIAL

## 2025-06-03 ENCOUNTER — TELEPHONE (OUTPATIENT)
Dept: GASTROENTEROLOGY | Facility: CLINIC | Age: 60
End: 2025-06-03
Payer: COMMERCIAL

## 2025-06-03 NOTE — TELEPHONE ENCOUNTER
Rajat, Bryon Fuchs MD  P [facility],  I created a letter for this patient based on MR results.  There are some changes on the MRI though they appear to be chronic.  I think it would be reasonable to proceed with an endoscopic ultrasound at this time or we can discuss in clinic with me or any MG CECI if she prefers.  She can reach out to scheduling or I can place another order for this.  Thanks, Bryon Earl MD

## 2025-06-04 ENCOUNTER — TELEPHONE (OUTPATIENT)
Dept: GASTROENTEROLOGY | Facility: CLINIC | Age: 60
End: 2025-06-04
Payer: COMMERCIAL

## 2025-06-04 NOTE — TELEPHONE ENCOUNTER
Bryon Earl MD Gonzalez, Arasely, LPN  There is thickening/enlargement of the pancreatic head present.  These changes have been seen as far back as 2021 so this would qualify as a chronic change.  Even back as far as 2008, there were chronic changes seen with lobularity of the pancreas when EUS was performed at that time.  However, given it looks somewhat different on this MR as compared to 2024 and it has been a long time since the last EUS, it would be reasonable to take another look.  I'll place the order.

## 2025-06-04 NOTE — TELEPHONE ENCOUNTER
Patient request to schedule with other providers for earlier date. Patient states she has never seen Dr Earl and does not know who he is and is okay with any provider. Patient expressed grievances that during previous call,  had disconnected the call.    In basket sent to Dr Earl if okay to add on during attending 6/19 at  or if okay to schedule with another provider.    Writer will contact patient to schedule.

## 2025-06-04 NOTE — TELEPHONE ENCOUNTER
"Patient disconnected the call before scheduling was complete - Schedule with Rajat at [facility] for EUS    Endoscopy Scheduling Screen    Caller: patient    Have you had any respiratory illness or flu-like symptoms in the last 10 days?  No    What is your communication preference for Instructions and/or Bowel Prep?   Mail/USPS    What insurance is in the chart?  Other:  [facility]    Ordering/Referring Provider: HEATHER MONSIVAIS   (If ordering provider performs procedure, schedule with ordering provider unless otherwise instructed. )    BMI: Estimated body mass index is 28.24 kg/m  as calculated from the following:    Height as of 4/9/25: 1.588 m (5' 2.52\").    Weight as of 5/23/25: 71.2 kg (157 lb).     Sedation Ordered  MAC/deep sedation.   BMI<= 45 45 < BMI <= 48 48 < BMI < = 50  BMI > 50   No Restrictions No [facility]  No [facility]  [facility] with exceptions Hospital Only OR Only       Do you have a history of malignant hyperthermia?  No    (Females) Are you currently pregnant?   No     Have you been diagnosed or told you have pulmonary hypertension?   No    Do you have an LVAD?  No    Have you been told you have moderate to severe sleep apnea?  No.    Have you been told you have COPD, asthma, or any other lung disease?  Yes     What breathing problems do you have?  Asthma     Do you use home oxygen?  No    Have your breathing problems required an ED visit or hospitalization in the last year?  No.    Has your doctor ordered any cardiac tests like echo, angiogram, stress test, ablation, or EKG, that you have not completed yet?  No    Do you  have a history of any heart conditions?  Yes     Have you had any hospitalizations  in the last year for heart related issues, for example a stent placement, heart attack, or cardiomyopathy?  No    Do you have any implantable devices in your body (pacemaker, ICD)?  No    Do you take nitroglycerine?  No    Have you ever had or are you waiting for an organ transplant?  No. " "Continue scheduling, no site restrictions.    Have you had a stroke or transient ischemic attack (TIA aka \"mini stroke\") in the last 2 years?   No.    Have you been diagnosed with or been told you have cirrhosis of the liver?   No.    Are you currently on dialysis?   No    Do you need assistance transferring?   Yes (Hospital Only)    BMI: Estimated body mass index is 28.24 kg/m  as calculated from the following:    Height as of 4/9/25: 1.588 m (5' 2.52\").    Weight as of 5/23/25: 71.2 kg (157 lb).     Is patients BMI > 40 and scheduling location [facility]?  No    Do you take an injectable or oral medication for weight loss or diabetes (excluding insulin)?  No    Do you take the medication Naltrexone?  No    Do you take blood thinners?  Yes, you must contact your prescribing provider for directions on holding or bridging with a different medication.       Prep   Are you currently on dialysis or do you have chronic kidney disease?  No    Do you have a diagnosis of diabetes?  Yes (Golytely Prep)    Do you have a diagnosis of cystic fibrosis (CF)?  No    On a regular basis do you go 3 -5 days between bowel movements?  No    BMI > 40?  No    Preferred Pharmacy:    [facility] - [place], [place] - [phone]  [address]  [facility] [address]  Phone: [phone] Fax: [phone] Alternate Fax: [phone], [phone]    [facility] [address] [facility] [employer] - [place], [place] - [address] [place] [address] [address]  [address] [place] [address]  Phone: [phone] Fax: [phone]        Final Scheduling Details     Procedure scheduled  Endoscopic ultrasound (EUS)    Surgeon:  LOI     Date of procedure:  TBD     Patient disconnected the call before scheduling was complete  "

## 2025-06-04 NOTE — TELEPHONE ENCOUNTER
LPN spoke with pt I let her know there is thickening/enlargement of the pancreatic head present. These changes have been seen as far back as 2021 so this would qualify as a chronic change. Even back as far as 2008, there were chronic changes seen with lobularity of the pancreas when EUS was performed at that time. However, given it looks somewhat different on this MR as compared to 2024 and it has been a long time since the last EUS, it would be reasonable to take another look. I provided her with endoscopy contact number [phone] for her to schedule her appointment. Pt voiced understanding. No further action needed.

## 2025-06-04 NOTE — TELEPHONE ENCOUNTER
LPN called pt left a message to return my call at [phone] to go over her results in more detail as she requested.     Also to let her know the Endoscopy US has been ordered and call [phone] so that she can schedule her appointment.

## 2025-06-05 ENCOUNTER — OFFICE VISIT (OUTPATIENT)
Dept: FAMILY MEDICINE | Facility: CLINIC | Age: 60
End: 2025-06-05
Payer: COMMERCIAL

## 2025-06-05 VITALS
HEART RATE: 67 BPM | DIASTOLIC BLOOD PRESSURE: 98 MMHG | HEIGHT: 63 IN | TEMPERATURE: 96.4 F | RESPIRATION RATE: 16 BRPM | SYSTOLIC BLOOD PRESSURE: 170 MMHG | BODY MASS INDEX: 28.69 KG/M2 | OXYGEN SATURATION: 99 % | WEIGHT: 161.9 LBS

## 2025-06-05 DIAGNOSIS — M35.9 UNDIFFERENTIATED CONNECTIVE TISSUE DISEASE: ICD-10-CM

## 2025-06-05 DIAGNOSIS — Q45.3 PANCREATIC ABNORMALITY: ICD-10-CM

## 2025-06-05 DIAGNOSIS — R93.89 ABNORMAL THYROID ULTRASOUND: ICD-10-CM

## 2025-06-05 DIAGNOSIS — R10.10 PAIN OF UPPER ABDOMEN: Primary | ICD-10-CM

## 2025-06-05 RX ORDER — TRAMADOL HYDROCHLORIDE 50 MG/1
50 TABLET ORAL EVERY 6 HOURS PRN
Qty: 25 TABLET | Refills: 0 | Status: SHIPPED | OUTPATIENT
Start: 2025-06-05

## 2025-06-05 NOTE — PROGRESS NOTES
"  Assessment & Plan     Pain of upper abdomen  No cocktail available  - Adult GI  Referral - Consult Only; Future  - lidocaine (viscous) (XYLOCAINE) 2 % 15 mL, alum & mag hydroxide-simethicone (MAALOX) 15 mL GI Cocktail    Pancreatic abnormality    - Adult GI  Referral - Consult Only; Future    Undifferentiated connective tissue disease    - traMADol (ULTRAM) 50 MG tablet; Take 1 tablet (50 mg) by mouth every 6 hours as needed for severe pain. TAKE 1 TABLET(50 MG) BY MOUTH EVERY 8 HOURS AS NEEDED FOR SEVERE PAIN    Abnormal thyroid ultrasound    - Adult E-Consult to ENT (Outpt Provider to Specialist Written Question & Response)          Nicotine/Tobacco Cessation  She reports that she has been smoking cigarettes. She started smoking about 10 years ago. She has a 0.2 pack-year smoking history. She has never used smokeless tobacco.  Nicotine/Tobacco Cessation Plan  Information offered: Patient not interested at this time  The longitudinal plan of care for the diagnosis(es)/condition(s) as documented were addressed during this visit. Due to the added complexity in care, I will continue to support Janine in the subsequent management and with ongoing continuity of care.  58 minutes spent by me on the date of the encounter doing chart review, history and exam, documentation and further activities per the note    BMI  Estimated body mass index is 29.12 kg/m  as calculated from the following:    Height as of this encounter: 1.588 m (5' 2.52\").    Weight as of this encounter: 73.4 kg (161 lb 14.4 oz).   Weight management plan: Discussed healthy diet and exercise guidelines          Subjective   Janine is a 59 year old, presenting for the following health issues:  Follow Up (Medication recheck/refill.)      6/5/2025     2:13 PM   Additional Questions   Roomed by KTR     History of Present Illness       Reason for visit:  Follow up   She is taking medications regularly.      1-reviewed abnl mammogram/normal " biopsy  2-reviewed in last mo or so more upper abd pain, can radiate to back, assoc w/ nausea occas vomit, no noted red/black stool, no  sx or fever. Very fatigued. GI holt reviewed, abnl MR, trying to do EUS, she'd like [facility] opinion as well, had called them and they need referral, done/printed/she takes it.   3-abnl thyroid US: remote FNA was not something she'll do again per pt very difficutl; w/ her permission in visit ENT e consult done is there a way to either test these further by imaging or other bx method, as several nodules are meeting FNA criteria - discussed  4-sparing use Ultram rvwd, also Rheum suggestion LDN reviewed and discussed, no red flags, I refill Ultram as it does help w/ Rheum and GI pains, tolearted, improves daily comfort function, uses sparingly,  checked.     She is open to trial GI cocktail in case pain has component of gastric pain, but we did not have the ingredients available to us.  She deferred my suggestion check cbc, cmet, lipase, ua/uc for pain    Past Medical History:   Diagnosis Date    Abnormal glandular Papanicolaou smear of cervix 1992    Allergic rhinitis     Allergy, airborne subst    Anxiety     Arthritis     ASCVD (arteriosclerotic cardiovascular disease)     Chronic pain     Chronic pancreatitis (H)     idiopathic, Tx: PPI, antioxidants, pancreatic enzymes    Common migraine     Coronary artery disease     Costochondritis     Diabetes mellitus, type 2 (H)     Difficulty in walking(719.7)     Dyspnea on exertion     Ectasia, mammary duct     [facility], persistent nipple discharge    Elevated fasting glucose     Gastro-oesophageal reflux disease     Granulomatosis with polyangiitis (H)     Hernia     History of angina     Hyperlipidemia     Hyperlipidemia LDL goal < 100     Hypertension goal BP (blood pressure) < 140/90     Essential hypertension    Inflammatory bowel disease     Iron deficiency anemia     Ischemic cardiomyopathy     Menorrhagia      Migraine headaches     Migraines     Mild persistent asthma     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 Matias, neg w/u, Rx'd w/prednisone    Ventral hernia, unspecified, without mention of obstruction or gangrene 2012     Past Surgical History:   Procedure Laterality Date    ABDOMEN SURGERY      BIOPSY      C/SECTION, LOW TRANSVERSE  1996        CARDIAC SURGERY      cardiac stent- recent in 16; 4 other stents    COLONOSCOPY N/A 2023    Procedure: COLONOSCOPY, WITH POLYPECTOMY;  Surgeon: Percy Gross MD;  Location: [facility] OR    DILATION AND CURETTAGE N/A 2016    Procedure: DILATION AND CURETTAGE;  Surgeon: Nahed Butler MD;  [facility]    ENDOMETRIAL SAMPLING (BIOPSY) N/A 2023    Procedure: ENDOMETRIAL BIOPSY;  Surgeon: Nahed Butler MD;  [facility]    ESOPHAGOSCOPY, GASTROSCOPY, DUODENOSCOPY (EGD), COMBINED N/A 2022    Procedure: ESOPHAGOGASTRODUODENOSCOPY, WITH BIOPSY;  Surgeon: Enzo Sesay MD;  [facility]: [facility]    ESOPHAGOSCOPY, GASTROSCOPY, DUODENOSCOPY (EGD), COMBINED N/A 2023    Procedure: ESOPHAGOGASTRODUODENOSCOPY, WITH BIOPSY;  Surgeon: Percy Gross MD;  Location: [facility] OR    EXAM UNDER ANESTHESIA  PELVIC N/A 04/28/2023    Procedure: EXAM UNDER ANESTHESIA;  Surgeon: Nahed Butler MD;  [facility]    [facility] ENDOSCOPY W EUS  11/07/2008    Dr. Pastrana, possible chronic pancreatitis, fatty liver    [place]IA [place]PA[place] [facility] [place] INTRAUTERINE DEVICE N/A 07/06/2016    Procedure: INSERT INTRAUTERINE DEVICE;  Surgeon: Nahed Butler MD;  [facility]    INT UTERINE FIBRIOD EMBOLIZATION  10/29/2014    LAPAROSCOPIC CHOLECYSTECTOMY  05/28/2008    Dr. Arnol GRUBBS TX, CERVICAL  1992    s/p LEEP, done at [facility] in [place].    ORBITOTOMY Right 03/15/2016    Procedure: ORBITOTOMY;  Surgeon: Myron Cyr MD;  Location:  [facility]    ORBITOTOMY Right 08/04/2017    Procedure: ORBITOTOMY;  RIGHT ORBITOTOMY AND BIOPSY;  Surgeon: Charis Holbrook MD;  Location: [facility]    REMOVE INTRAUTERINE DEVICE N/A 04/28/2023    Procedure: Remove intrauterine device,;  Surgeon: Nahed Butler MD;  [facility]    REPAIR PTOSIS Right 11/17/2017    Procedure: REPAIR PTOSIS;  RIGHT UPPER LID PTOSIS REPAIR;  Surgeon: Myron Cyr MD;  Location:  [facility]    UPPER GI ENDOSCOPY  10/21/2008    mild gastritis, Dr. Rocky [facility] E[facility] HEART XTHORACIC,COMPLETE, W/O DOPPLER  02/04/2004    [facility] WNL, EF 60%     Current Outpatient Medications   Medication Sig Dispense Refill    acetaminophen (TYLENOL) 325 MG tablet Take 1-2 tablets (325-650 mg) by mouth every 6 hours as needed for pain (headache) 250 tablet 4    ADVAIR DISKUS 250-50 MCG/ACT inhaler Inhale 1 puff into the lungs 2 times daily      albuterol (2.5 MG/3ML) 0.083% neb solution INL 1 VIAL VIA NEBULIZATION Q 4 TO 6 HOURS PRN  1    albuterol (PROAIR HFA, PROVENTIL HFA, VENTOLIN HFA) 108 (90 BASE) MCG/ACT inhaler Inhale 2 puffs into the lungs every 6 hours as needed for shortness of breath / dyspnea or wheezing 3 Inhaler 1    BANOPHEN 25 MG tablet Take 25 mg by mouth At Bedtime      BD PEN NEEDLE MARCK 2ND GEN 32G X 4 MM  miscellaneous USE ONE PEN NEEDLE DAILY OR AS DIRECTED 100 each 2    blood glucose (NO BRAND SPECIFIED) lancets standard Use to test blood sugar 1-4 times daily or as directed. 400 each 3    blood glucose (NO BRAND SPECIFIED) test strip Use to test blood sugar 1-4 times daily or as directed 400 strip 3    blood glucose monitoring (NO BRAND SPECIFIED) meter device kit Use to test blood sugar 1-4 times daily or as directed. 1 kit 0    Blood Pressure Monitor KIT 1 each daily Monitor home blood pressure as instructed by physician.  Dispense [place] blood pressure kit. 1 kit 0    calcium carbonate (TUMS) 500 MG chewable tablet Take 3-4 tablets (1,500-2,000 mg) by mouth daily as needed 90 tablet 3    clopidogrel (PLAVIX) 75 MG tablet Take 1 tablet (75 mg) by mouth daily. . 30 tablet 3    clotrimazole (MYCELEX) 10 MG lozenge Place 1 lozenge (10 mg) inside cheek 5 times daily 50 lozenge 1    COMPOUNDED NON-CONTROLLED SUBSTANCE (CMPD RX) - PHARMACY TO MIX COMPOUNDED MEDICATION Ketroprofen 5% cream compound 2- 4 times a day as needed over knee for knee pain 3 each 3    cyanocobalamin (VITAMIN B-12) 1000 MCG tablet Take 1 tablet by mouth daily at 2 pm      cyclobenzaprine (FLEXERIL) 10 MG tablet Take 1 tablet (10 mg) by mouth 2 times daily as needed for muscle spasms 60 tablet 3    cycloSPORINE (RESTASIS) 0.05 % ophthalmic emulsion 1 month supply 11 refills 1 each 11    dicyclomine (BENTYL) 20 MG tablet TAKE 1 TABLET(20 MG) BY MOUTH FOUR TIMES DAILY AS NEEDED. 60 tablet 4    empagliflozin (JARDIANCE) 25 MG TABS tablet Take 1 tablet (25 mg) by mouth daily. Follow up visit needed. Call  to schedule. 90 tablet 0    EPIPEN 2-RIKY 0.3 MG/0.3ML injection INJECT 0.3 MG INTO THE MUSCLE PRF ANAPHYALAXIS  0    esomeprazole (NEXIUM) 40 MG DR capsule Take 1 capsule (40 mg) by mouth 2 times daily Take 30-60 minutes before eating. 180 capsule 0    fluticasone (FLONASE) 50 MCG/ACT nasal spray Spray 1 spray in nostril as needed       folic acid (FOLVITE) 1 MG tablet TAKE 1 TABLET BY MOUTH EVERY DAY 90 tablet 0    insulin glargine (LANTUS PEN) 100 UNIT/ML pen Inject 56 Units Subcutaneous every morning Or as directed. 75 mL 1    lisinopril-hydrochlorothiazide (ZESTORETIC) 20-25 MG tablet Take 1 tablet by mouth daily. 30 tablet 3    magnesium 250 MG tablet TAKE 1 TABLET(250 MG) BY MOUTH TWICE DAILY 60 tablet 3    Magnesium Oxide 250 MG tablet Take 1 tablet (250 mg) by mouth 2 times daily. 60 tablet 1    metFORMIN (GLUCOPHAGE XR) 500 MG 24 hr tablet TAKE 4 TABLETS BY MOUTH DAILY WITH DINNER 360 tablet 1    metoprolol succinate ER (TOPROL XL) 100 MG 24 hr tablet Take 1 tablet (100 mg) by mouth daily. 30 tablet 3    Multiple Vitamin (DAILY-GERALD MULTIVITAMIN) TABS TAKE 1 TABLET BY MOUTH EVERY  tablet 3    nitroGLYcerin (NITROSTAT) 0.4 MG sublingual tablet FOR CHEST PAIN PLACE 1 TABLET UNDER THE TONGUE EVERY 5 MINUTES FOR 3 DOSES. IF SYMPTOMS PERSIST 5 MINUTES AFTER 1ST DOSE CALL 911. 25 tablet 11    olopatadine (PATANOL) 0.1 % ophthalmic solution Place 1 drop into both eyes as needed      ondansetron (ZOFRAN ODT) 8 MG ODT tab TAKE 1 TABLET BY MOUTH EVERY 8 HOURS AS NEEDED FOR NAUSEA 20 tablet 1    pravastatin (PRAVACHOL) 40 MG tablet TAKE 1 TABLET BY MOUTH EVERY DAY 30 tablet 3    pregabalin (LYRICA) 25 MG capsule Take 2 capsules (50 mg) by mouth daily. 180 capsule 1    sennosides (SENOKOT) 8.6 MG tablet 1-2 tabs a day as needed for constipation 60 tablet 1    spironolactone (ALDACTONE) 25 MG tablet TAKE 1 TABLET (25 MG) BY MOUTH DAILY. MAY ALSO TAKE 0.5 TABLETS (12.5 MG) DAILY AS NEEDED (FOR WEIGHT GAIN). 45 tablet 3    sucralfate (CARAFATE) 1 GM tablet Take 1 tablet (1 g) by mouth 4 times daily 120 tablet 3    traMADol (ULTRAM) 50 MG tablet Take 1 tablet (50 mg) by mouth every 6 hours as needed for severe pain. TAKE 1 TABLET(50 MG) BY MOUTH EVERY 8 HOURS AS NEEDED FOR SEVERE PAIN 25 tablet 0    YUVAFEM 10 MCG TABS vaginal tablet PLACE 1 TABLET  (10 MCG) VAGINALLY TWICE A WEEK 24 tablet 1     Current Facility-Administered Medications   Medication Dose Route Frequency Provider Last Rate Last Admin    lidocaine (viscous) (XYLOCAINE) 2 % 15 mL, alum & mag hydroxide-simethicone (MAALOX) 15 mL GI Cocktail  30 mL Oral Once          Allergies   Allergen Reactions    Amoxicillin-Pot Clavulanate      Unknown possible hives and edema    Amoxicillin-Pot Clavulanate     Artificial Sweetner (Informational Only)  Other (See Comments)     Increased headache    Azithromycin     Clavulanic Acid     Diatrizoate Other (See Comments)     Pt wants this listed because she is allergic to shellfish     Imitrex [Triptans]      Severe face/neck/chest tightness and flushing side effects     Penicillins Hives     Unknown     Pork Allergy      Stomach pain, cramping, diarrhea, itching, nausea and headaches    Shellfish Allergy Hives and Swelling    Shellfish-Derived Products     Sulfa Antibiotics Hives and Swelling    Sulfatolamide     Zithromax [Azithromycin Dihydrate] Swelling     Unknown      Family History   Problem Relation Age of Onset    Hypertension Mother     Arthritis Mother     Heart Disease Mother         long qt syndrome    Thyroid Disease Mother     C.A.D. Mother     Unknown/Adopted Mother     Heart Disease Father         heart attack    Hypertension Father     Depression Father     C.A.D. Father     Neurologic Disorder Sister         migraines    Neurologic Disorder Sister         migraines    Heart Disease Brother 15        MI at 15, 16.     Arthritis Brother         he passed away, had severe arthritis at age 11    C.A.D. Brother     Respiratory Son         asthma    Hypertension Maternal Aunt     Hypertension Maternal Uncle     Arthritis Maternal Uncle     Eye Disorder Maternal Uncle         cataracts    Family History Negative Other         neg for RA, SLE    Coronary Artery Disease Brother     Heart Failure Brother     Arthritis Brother     Heart Disease  Brother     Coronary Artery Disease Brother     Early Death Brother     Ovarian Cancer Cousin     Skin Cancer No family hx of         No known family hx of skin cancer    Deep Vein Thrombosis (DVT) No family hx of     Dementia No family hx of     Asthma No family hx of     Diabetes No family hx of     Cerebrovascular Disease No family hx of     Anesthesia Reaction No family hx of      Social History     Socioeconomic History    Marital status: Single     Spouse name: Not on file    Number of children: 1    Years of education: Not on file    Highest education level: Not on file   Occupational History     Employer: NONE    Tobacco Use    Smoking status: Some Days     Current packs/day: 0.00     Average packs/day: 0.2 packs/day for 1 year (0.2 ttl pk-yrs)     Types: Cigarettes     Start date: 2015     Last attempt to quit: 2016     Years since quittin.3    Smokeless tobacco: Never   Vaping Use    Vaping status: Every Day    Substances: Nicotine   Substance and Sexual Activity    Alcohol use: No     Alcohol/week: 0.0 standard drinks of alcohol    Drug use: No    Sexual activity: Not Currently   Other Topics Concern    Parent/sibling w/ CABG, MI or angioplasty before 65F 55M? Yes   Social History Narrative    Balanced Diet - sometimes    Osteoporosis Prevention Measures - Dairy servings per day: 2 servings weekly    Regular Exercise -  Yes Describe walking 4 times a week    Dental Exam - NO    Seatbelts used - Yes    Self Breast Exam - Yes    Abuse: Current or Past (Physical, Sexual or Emotional)- No    Do you have any concerns about STD's -  No    Do you feel safe in your environment - No    Guns stored in the home - No    Sunscreen used - Yes    Lipids -  YES - Date:     Glucose -  YES - Date:     Eye Exam - YES - Date: one year ago    Colon Cancer Screening - No    Hemoccults - NO    Pap Test -  YES - Date: , remote history of LEEP    Mammography - YES - Date: last spring, would like to  "discuss, needs a referral to [facility]    DEXA - NO    Immunizations reviewed and up to date - Yes, last td given in 1997 or 1998     Social Drivers of Health     Financial Resource Strain: Low Risk  (2/9/2024)    Financial Resource Strain     Within the past 12 months, have you or your family members you live with been unable to get utilities (heat, electricity) when it was really needed?: No   Food Insecurity: High Risk (2/9/2024)    Food Insecurity     Within the past 12 months, did you worry that your food would run out before you got money to buy more?: Yes     Within the past 12 months, did the food you bought just not last and you didn t have money to get more?: No   Transportation Needs: Low Risk  (2/9/2024)    Transportation Needs     Within the past 12 months, has lack of transportation kept you from medical appointments, getting your medicines, non-medical meetings or appointments, work, or from getting things that you need?: No   Physical Activity: Not on file   Stress: Not on file   Social Connections: Not on file   Interpersonal Safety: Low Risk  (4/9/2025)    Interpersonal Safety     Do you feel physically and emotionally safe where you currently live?: Yes     Within the past 12 months, have you been hit, slapped, kicked or otherwise physically hurt by someone?: No     Within the past 12 months, have you been humiliated or emotionally abused in other ways by your partner or ex-partner?: No   Housing Stability: Low Risk  (2/9/2024)    Housing Stability     Do you have housing? : Yes     Are you worried about losing your housing?: No             Objective    BP (!) 170/98 (BP Location: Right arm, Patient Position: Sitting, Cuff Size: Adult Regular)   Pulse 67   Temp (!) 96.4  F (35.8  C) (Temporal)   Resp 16   Ht 1.588 m (5' 2.52\")   Wt 73.4 kg (161 lb 14.4 oz)   LMP  (LMP Unknown)   SpO2 99%   BMI 29.12 kg/m    Body mass index is 29.12 kg/m .  Physical Exam   GENERAL: alert " and no distress  RESP: lungs clear to auscultation - no rales, rhonchi or wheezes  CV: regular rate and rhythm, normal S1 S2, no S3 or S4, no murmur, click or rub, no peripheral edema  ABDOMEN: soft, epig tender, no rebound guard, no hepatosplenomegaly, no masses and bowel sounds normal  MS: no gross musculoskeletal defects noted, no edema            Signed Electronically by: Kash Solano MD     (M6) obeys commands

## 2025-06-06 ENCOUNTER — TELEPHONE (OUTPATIENT)
Dept: INTERNAL MEDICINE | Facility: CLINIC | Age: 60
End: 2025-06-06

## 2025-06-06 NOTE — TELEPHONE ENCOUNTER
Left a detailed message for the pt.    1. I called [facility] and learned that they were waiting to clarify the prescription instructions with rheumatology. The medication is now ready to be mailed, and the patient needs to call the pharmacy to confirm. I provided the patient with the phone number.    2. I asked about PPI and she needs to call back. Then the call was disconnected due to the length of the message, so I will discuss the lab orders with her when she returns the call.    Soon-Mi  ----------------------------------------------------------------------------------------        ----- Message from Kash Solano sent at 6/5/2025  8:47 PM CDT -----  I saw her today    We were going to give her GI cocktail then re assess    We could not find the ingredients and she left; so I did not get a chance to discuss with her    Can you see if on PPI or not, if not refill her Nexium     Also we discussed labs. She did not wish to do but was thinking about it; if ok w/ it, do cbc/diff, cmet lipase and ua/uc re; abdomen pain      [facility]fen pt has not heard anything can you call them to see if they can make this for her? Perhaps they are doing some PA thing w/ Dr Mckinnon?   Thx

## 2025-06-09 ENCOUNTER — E-CONSULT (OUTPATIENT)
Dept: OTOLARYNGOLOGY | Facility: CLINIC | Age: 60
End: 2025-06-09
Payer: COMMERCIAL

## 2025-06-09 ENCOUNTER — TELEPHONE (OUTPATIENT)
Dept: INTERNAL MEDICINE | Facility: CLINIC | Age: 60
End: 2025-06-09

## 2025-06-09 DIAGNOSIS — E04.1 THYROID NODULE: Primary | ICD-10-CM

## 2025-06-09 DIAGNOSIS — E11.9 TYPE 2 DIABETES, HBA1C GOAL < 7% (H): ICD-10-CM

## 2025-06-09 NOTE — PROGRESS NOTES
6/9/2025     E-Consult has been accepted.    Interprofessional consultation requested by:  Kash Solano MD      Clinical Question/Purpose: MY CLINICAL QUESTION IS: Several nodules on thyroid US over severeal years noted to be large enough to consider FNA. Pt did have FNA in past and does not want to repeat that. Is there another way to do biopsy or evaluation- perhaps other form of imaging, other than repeating US again in a year?     Patient assessment and information reviewed: notes and imaging reports    Recommendations: The least invasive procedure would be an FNA. Agree with endocrine assessment from 2024 that the increase in size of the R nodule would reflect indication for repeat FNA. No additional imaging is recommended or needed other than ultrasound.        The recommendations provided in this E-Consult are based on a review of clinical data pertinent to the clinical question presented, without a review of the patient's complete medical record or, the benefit of a comprehensive in-person or virtual patient evaluation. This consultation should not replace the clinical judgement and evaluation of the provider ordering this E-Consult. Any new clinical issues, or changes in patient status since the filing of this E-Consult will need to be taken into account when assessing these recommendations. Please contact me if you have further questions.    My total time spent reviewing clinical information and formulating assessment was 5 minutes.        Sangeetha Vasquez MD

## 2025-06-09 NOTE — TELEPHONE ENCOUNTER
Left a detailed message to the pt regarding this.    Soon-Mi  ----------------------------------------------------------------------------------        ----- Message from Kash Solano sent at 6/9/2025  1:46 PM CDT -----  I saw her late last week; the pt was advised to have FNA thyroid but did not wish to (had one in the past, did not want to go through that again); I did ENT e consult and asked if a different way to assess the thyroid exists, perhaps a different imaging type or a way to biopsy.  ENT replied there is not, and agreed FNA is the best way to assess they abnormal thyroid tissue mentioned on the ultrasound, and that an ultrasound is the best way to do imaging. If we do an FNA I can order it; I would prefer the FNA as most thorough way to evaluate. But if not let's redo the US in 6-12 months.

## 2025-06-10 ENCOUNTER — TELEPHONE (OUTPATIENT)
Dept: GASTROENTEROLOGY | Facility: CLINIC | Age: 60
End: 2025-06-10
Payer: COMMERCIAL

## 2025-06-10 NOTE — TELEPHONE ENCOUNTER
The patient reports that she does not have someone who can stay at the facility while she is there for her procedure. She will have to call someone after her procedure to come pick her up. A message was sent to Jaimie Blankenship RN regarding this.    Pre assessment completed for upcoming procedure.   (Please see previous telephone encounter notes for complete details)    Patient returned call.     Please verify assistance needs (e.g. Does patient need a wheelchair? Can patient self transfer onto bed? Does patient need gigi lift?) Please include any assistance needs in special needs section of status board. Please let me know if you've any questions. The patient reports that she does not have a wheelchair or require assistance with transferring or ambulating.    Procedure details:    Procedure date 6/17/25, arrival time 0915 and facility location reviewed.    Pre op exam needed? No.    Designated  policy reviewed. Instructed to have someone stay 24  hours post procedure.     Medication review:    Blood thinner/Anti-platelet medication(s):  Clopidogrel (Plavix): Recommended HOLD 5 days before procedure.  Consult with your managing provider.  Oral diabetic medication(s): Jardiance (empagliflozin): HOLD  3 days before procedure.  Metformin (glucophage): HOLD day of procedure.  Insulin. Consult with your managing provider.  Sucralfate (Carafate): HOLD 1 day before procedure.    Prep for procedure:     Procedure prep instructions reviewed.      Any additional information needed:  N/A    Patient verbalized understanding and had no questions or concerns at this time.      Yesi Francis LPN  Endoscopy Procedure Pre Assessment   [phone] option 3

## 2025-06-10 NOTE — TELEPHONE ENCOUNTER
Incoming call from patient stating that she is returning a call regarding the  concerns for her procedure. She states that her  will be available by phone and will be able to get to the facility within 15 minutes of the call that she is being discharged.    Shannan Bhat LPN on 6/10/2025 at 11:30 AM

## 2025-06-10 NOTE — TELEPHONE ENCOUNTER
Attempted to contact patient in order to complete pre assessment questions.     No answer. Left message to return call to [phone] option 3    Callback communication sent via secure message link sent by text .      Rafael Cantrell RN  Endoscopy Procedure Pre Assessment      --------------------------------------------------------------------------------------------------------------------      Please verify assistance needs (e.g. Does patient need a wheelchair? Can patient self transfer onto bed? Does patient need gigi lift?) Please include any assistance needs in special needs section of status board. Please let me know if you've any questions.      Pre visit planning completed.      Procedure details:    Patient scheduled for Endoscopic ultrasound (EUS) on 6/19/25.     Arrival time: 0915. Procedure time 1045    Facility location: [facility]; [address] [place] [address], [address] Floor, [place] [address]. Check in location: Main entrance at registration desk.  *Disclaimer: Drivers are to check in with patient and stay on campus during procedure.     Sedation type: MAC    Pre op exam needed? No.    Indication for procedure:   Chronic pancreatitis, unspecified pancreatitis type (H) [K86.1]  - Primary  Pancreatic mass [K86.89]        Chart review:     Electronic implanted devices? No    Recent diagnosis of diverticulitis within the last 6 weeks? No      Medication review:    Diabetic? Yes. Oral diabetic medications: Jardiance (empagliflozin): HOLD  3 days before procedure.  Metformin (glucophage): HOLD day of procedure.  Insulin: Consult with managing provider.     Anticoagulants? Yes Clopidogrel (Plavix): Recommended HOLD 5 days before procedure.  Consult with your managing provider.    Weight loss medication/injectable? No GLP-1 medication per patient's medication list. Nursing to verify with pre-assessment call.    Other medication HOLDING recommendations:  EGD: Sucralfate (Carafate): HOLD 1 day  before procedure.      Prep for procedure:     Bowel prep recommendation: N/A    Procedure information and instructions sent via secure message link sent by text          Rafael Cantrell RN  Endoscopy Procedure Pre Assessment   [phone] option 3

## 2025-06-10 NOTE — TELEPHONE ENCOUNTER
"Message from Jaimie:    Jaimie Blankenship, RN  Yesi Francis LPN    \"Please make sure they know we will not complete the procedure until we confirm a ride is able to pick them up the day of. Their ride should be within 15 min of a phone call so we can coordinate a timely  when they are ready to discharge.\"    Thanks,  Jaimie    Left a message for the patient to discuss Jaimie's message regarding the patients ride after the procedure.    Yesi Francis LPN  Endoscopy Procedure Pre Assessment   [phone] option 2    "

## 2025-06-11 NOTE — TELEPHONE ENCOUNTER
insulin glargine (LANTUS PEN) 100 UNIT/ML pen         Last Written Prescription Date:  5/1/24  Last Fill Quantity: 75 ml,   # refills: 1  Last Office Visit : 8/22/24  Future Office visit:  none noted    Routing refill request to provider for review/approval because:  Insulin and insulin pump supplies - refilled per Endocrine clinic.

## 2025-06-18 ENCOUNTER — ANESTHESIA EVENT (OUTPATIENT)
Dept: GASTROENTEROLOGY | Facility: CLINIC | Age: 60
End: 2025-06-18
Payer: COMMERCIAL

## 2025-06-19 ENCOUNTER — ANESTHESIA (OUTPATIENT)
Dept: GASTROENTEROLOGY | Facility: CLINIC | Age: 60
End: 2025-06-19
Payer: COMMERCIAL

## 2025-06-19 ENCOUNTER — HOSPITAL ENCOUNTER (OUTPATIENT)
Facility: CLINIC | Age: 60
Discharge: HOME | End: 2025-06-19
Attending: INTERNAL MEDICINE | Admitting: INTERNAL MEDICINE
Payer: COMMERCIAL

## 2025-06-19 VITALS
TEMPERATURE: 98.1 F | HEART RATE: 76 BPM | RESPIRATION RATE: 18 BRPM | DIASTOLIC BLOOD PRESSURE: 90 MMHG | SYSTOLIC BLOOD PRESSURE: 138 MMHG | OXYGEN SATURATION: 100 %

## 2025-06-19 LAB
GLUCOSE BLDC GLUCOMTR-MCNC: 259 MG/DL (ref 70–99)
UPPER EUS: NORMAL

## 2025-06-19 PROCEDURE — 258N000003 HC RX IP 258 OP 636: Performed by: NURSE ANESTHETIST, CERTIFIED REGISTERED

## 2025-06-19 PROCEDURE — 250N000009 HC RX 250: Performed by: INTERNAL MEDICINE

## 2025-06-19 PROCEDURE — 370N000017 HC ANESTHESIA TECHNICAL FEE, PER MIN: Performed by: INTERNAL MEDICINE

## 2025-06-19 PROCEDURE — 82962 GLUCOSE BLOOD TEST: CPT

## 2025-06-19 PROCEDURE — 43237 ENDOSCOPIC US EXAM ESOPH: CPT | Performed by: INTERNAL MEDICINE

## 2025-06-19 PROCEDURE — 250N000009 HC RX 250: Performed by: NURSE ANESTHETIST, CERTIFIED REGISTERED

## 2025-06-19 PROCEDURE — 250N000011 HC RX IP 250 OP 636: Performed by: NURSE ANESTHETIST, CERTIFIED REGISTERED

## 2025-06-19 RX ORDER — OXYCODONE HYDROCHLORIDE 10 MG/1
10 TABLET ORAL
Status: DISCONTINUED | OUTPATIENT
Start: 2025-06-19 | End: 2025-06-19 | Stop reason: HOSPADM

## 2025-06-19 RX ORDER — NALOXONE HYDROCHLORIDE 0.4 MG/ML
0.1 INJECTION, SOLUTION INTRAMUSCULAR; INTRAVENOUS; SUBCUTANEOUS
Status: DISCONTINUED | OUTPATIENT
Start: 2025-06-19 | End: 2025-06-19 | Stop reason: HOSPADM

## 2025-06-19 RX ORDER — LIDOCAINE 40 MG/G
CREAM TOPICAL
Status: DISCONTINUED | OUTPATIENT
Start: 2025-06-19 | End: 2025-06-19 | Stop reason: HOSPADM

## 2025-06-19 RX ORDER — FLUMAZENIL 0.1 MG/ML
0.2 INJECTION, SOLUTION INTRAVENOUS
Status: DISCONTINUED | OUTPATIENT
Start: 2025-06-19 | End: 2025-06-19 | Stop reason: HOSPADM

## 2025-06-19 RX ORDER — NALOXONE HYDROCHLORIDE 0.4 MG/ML
0.4 INJECTION, SOLUTION INTRAMUSCULAR; INTRAVENOUS; SUBCUTANEOUS
Status: DISCONTINUED | OUTPATIENT
Start: 2025-06-19 | End: 2025-06-19 | Stop reason: HOSPADM

## 2025-06-19 RX ORDER — ONDANSETRON 2 MG/ML
INJECTION INTRAMUSCULAR; INTRAVENOUS PRN
Status: DISCONTINUED | OUTPATIENT
Start: 2025-06-19 | End: 2025-06-19

## 2025-06-19 RX ORDER — NALOXONE HYDROCHLORIDE 0.4 MG/ML
0.2 INJECTION, SOLUTION INTRAMUSCULAR; INTRAVENOUS; SUBCUTANEOUS
Status: DISCONTINUED | OUTPATIENT
Start: 2025-06-19 | End: 2025-06-19 | Stop reason: HOSPADM

## 2025-06-19 RX ORDER — ONDANSETRON 2 MG/ML
4 INJECTION INTRAMUSCULAR; INTRAVENOUS
Status: DISCONTINUED | OUTPATIENT
Start: 2025-06-19 | End: 2025-06-19 | Stop reason: HOSPADM

## 2025-06-19 RX ORDER — ALBUTEROL SULFATE 0.83 MG/ML
2.5 SOLUTION RESPIRATORY (INHALATION) ONCE
Status: COMPLETED | OUTPATIENT
Start: 2025-06-19 | End: 2025-06-19

## 2025-06-19 RX ORDER — DEXAMETHASONE SODIUM PHOSPHATE 4 MG/ML
4 INJECTION, SOLUTION INTRA-ARTICULAR; INTRALESIONAL; INTRAMUSCULAR; INTRAVENOUS; SOFT TISSUE
Status: DISCONTINUED | OUTPATIENT
Start: 2025-06-19 | End: 2025-06-19 | Stop reason: HOSPADM

## 2025-06-19 RX ORDER — OXYCODONE HYDROCHLORIDE 5 MG/1
5 TABLET ORAL
Status: DISCONTINUED | OUTPATIENT
Start: 2025-06-19 | End: 2025-06-19 | Stop reason: HOSPADM

## 2025-06-19 RX ORDER — SODIUM CHLORIDE, SODIUM LACTATE, POTASSIUM CHLORIDE, CALCIUM CHLORIDE 600; 310; 30; 20 MG/100ML; MG/100ML; MG/100ML; MG/100ML
INJECTION, SOLUTION INTRAVENOUS CONTINUOUS PRN
Status: DISCONTINUED | OUTPATIENT
Start: 2025-06-19 | End: 2025-06-19

## 2025-06-19 RX ORDER — ONDANSETRON 2 MG/ML
4 INJECTION INTRAMUSCULAR; INTRAVENOUS EVERY 30 MIN PRN
Status: DISCONTINUED | OUTPATIENT
Start: 2025-06-19 | End: 2025-06-19 | Stop reason: HOSPADM

## 2025-06-19 RX ORDER — ONDANSETRON 2 MG/ML
4 INJECTION INTRAMUSCULAR; INTRAVENOUS EVERY 6 HOURS PRN
Status: DISCONTINUED | OUTPATIENT
Start: 2025-06-19 | End: 2025-06-19 | Stop reason: HOSPADM

## 2025-06-19 RX ORDER — ONDANSETRON 4 MG/1
4 TABLET, ORALLY DISINTEGRATING ORAL EVERY 6 HOURS PRN
Status: DISCONTINUED | OUTPATIENT
Start: 2025-06-19 | End: 2025-06-19 | Stop reason: HOSPADM

## 2025-06-19 RX ORDER — LIDOCAINE HYDROCHLORIDE 20 MG/ML
INJECTION, SOLUTION INFILTRATION; PERINEURAL PRN
Status: DISCONTINUED | OUTPATIENT
Start: 2025-06-19 | End: 2025-06-19

## 2025-06-19 RX ORDER — PROPOFOL 10 MG/ML
INJECTION, EMULSION INTRAVENOUS PRN
Status: DISCONTINUED | OUTPATIENT
Start: 2025-06-19 | End: 2025-06-19

## 2025-06-19 RX ORDER — ONDANSETRON 4 MG/1
4 TABLET, ORALLY DISINTEGRATING ORAL EVERY 30 MIN PRN
Status: DISCONTINUED | OUTPATIENT
Start: 2025-06-19 | End: 2025-06-19 | Stop reason: HOSPADM

## 2025-06-19 RX ORDER — PROPOFOL 10 MG/ML
INJECTION, EMULSION INTRAVENOUS CONTINUOUS PRN
Status: DISCONTINUED | OUTPATIENT
Start: 2025-06-19 | End: 2025-06-19

## 2025-06-19 RX ORDER — PROCHLORPERAZINE MALEATE 10 MG
10 TABLET ORAL EVERY 6 HOURS PRN
Status: DISCONTINUED | OUTPATIENT
Start: 2025-06-19 | End: 2025-06-19 | Stop reason: HOSPADM

## 2025-06-19 RX ADMIN — PROPOFOL 40 MG: 10 INJECTION, EMULSION INTRAVENOUS at 11:20

## 2025-06-19 RX ADMIN — SODIUM CHLORIDE, SODIUM LACTATE, POTASSIUM CHLORIDE, AND CALCIUM CHLORIDE: .6; .31; .03; .02 INJECTION, SOLUTION INTRAVENOUS at 11:15

## 2025-06-19 RX ADMIN — PROPOFOL 150 MCG/KG/MIN: 10 INJECTION, EMULSION INTRAVENOUS at 11:15

## 2025-06-19 RX ADMIN — LIDOCAINE HYDROCHLORIDE 100 MG: 20 INJECTION, SOLUTION INFILTRATION; PERINEURAL at 11:15

## 2025-06-19 RX ADMIN — ONDANSETRON 4 MG: 2 INJECTION INTRAMUSCULAR; INTRAVENOUS at 11:24

## 2025-06-19 RX ADMIN — ALBUTEROL SULFATE 2.5 MG: 2.5 SOLUTION RESPIRATORY (INHALATION) at 12:19

## 2025-06-19 ASSESSMENT — ACTIVITIES OF DAILY LIVING (ADL)
ADLS_ACUITY_SCORE: 50

## 2025-06-19 NOTE — PROGRESS NOTES
I have seen and evaluated the patient today.  There are no significant changes in the patient's history or physical exam from the prior date of service.  The patient is stable and appropriate to undergo the proposed endoscopic procedure today.  Bryon Earl MD

## 2025-06-19 NOTE — ANESTHESIA CARE TRANSFER NOTE
Patient: Janine Cornell    Procedure: Procedure(s):  Endoscopic ultrasound upper gastrointestinal tract (GI)       Diagnosis: Chronic pancreatitis, unspecified pancreatitis type (H) [K86.1]  Pancreatic mass [K86.89]  Diagnosis Additional Information: No value filed.    Anesthesia Type:   MAC     Note:    Oropharynx: oropharynx clear of all foreign objects and spontaneously breathing  Level of Consciousness: awake  Oxygen Supplementation: room air    Independent Airway: airway patency satisfactory and stable  Dentition: dentition unchanged  Vital Signs Stable: post-procedure vital signs reviewed and stable  Report to RN Given: handoff report given  Patient transferred to: Phase II    Handoff Report: Identifed the Patient, Identified the Reponsible Provider, Reviewed the pertinent medical history, Discussed the surgical course, Reviewed Intra-OP anesthesia mangement and issues during anesthesia, Set expectations for post-procedure period and Allowed opportunity for questions and acknowledgement of understanding  Vitals:  Vitals Value Taken Time   BP     Temp     Pulse     Resp     SpO2         Electronically Signed By: KAYLYNN Rowan CRNA  June 19, 2025  11:58 AM

## 2025-06-19 NOTE — ANESTHESIA PREPROCEDURE EVALUATION
Anesthesia Pre-Procedure Evaluation    Patient: Janine Cornell   MRN: [id number] : 1965          Procedure : Procedure(s):  Endoscopic ultrasound upper gastrointestinal tract (GI)         Past Medical History:   Diagnosis Date    Abnormal glandular Papanicolaou smear of cervix     Allergic rhinitis     Allergy, airborne subst    Anxiety     Arthritis     ASCVD (arteriosclerotic cardiovascular disease)     Chronic pain     Chronic pancreatitis (H)     idiopathic, Tx: PPI, antioxidants, pancreatic enzymes    Common migraine     Coronary artery disease     Costochondritis     Diabetes mellitus, type 2 (H)     Difficulty in walking(719.7)     Dyspnea on exertion     Ectasia, mammary duct     [facility], persistent nipple discharge    Elevated fasting glucose     Gastro-oesophageal reflux disease     Granulomatosis with polyangiitis (H)     Hernia     History of angina     Hyperlipidemia     Hyperlipidemia LDL goal < 100     Hypertension goal BP (blood pressure) < 140/90     Essential hypertension    Inflammatory bowel disease     Iron deficiency anemia     Ischemic cardiomyopathy     Menorrhagia     Migraine headaches     Migraines     Mild persistent asthma     Neuritis optic     subacute autoimmune retrobulbar neuritis, Dr. 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 Matias, neg w/u, Rx'd w/prednisone    Ventral hernia, unspecified, without mention of obstruction or gangrene 2012      Past Surgical History:   Procedure Laterality Date    ABDOMEN SURGERY      BIOPSY      C/SECTION, LOW TRANSVERSE  1996        CARDIAC SURGERY      cardiac stent- recent in 16; 4 other stents    COLONOSCOPY N/A 2023    Procedure: COLONOSCOPY, WITH POLYPECTOMY;  Surgeon: Percy Gross MD;  Location: [facility] OR    DILATION AND CURETTAGE N/A 2016    Procedure: DILATION AND CURETTAGE;  Surgeon: Nahed Butler MD;  [facility]    ENDOMETRIAL SAMPLING (BIOPSY) N/A 2023    Procedure: ENDOMETRIAL BIOPSY;  Surgeon: Nahed Butler MD;  [facility]    ESOPHAGOSCOPY, GASTROSCOPY, DUODENOSCOPY (EGD), COMBINED N/A 2022    Procedure: ESOPHAGOGASTRODUODENOSCOPY, WITH BIOPSY;  Surgeon: Enzo Sesay MD;  Location: [facility]    ESOPHAGOSCOPY, GASTROSCOPY, DUODENOSCOPY (EGD), COMBINED N/A 2023    Procedure: ESOPHAGOGASTRODUODENOSCOPY, WITH BIOPSY;  Surgeon: Percy Gross MD;  Location: [facility] OR    EXAM UNDER ANESTHESIA PELVIC N/A 2023    Procedure: EXAM UNDER ANESTHESIA;  Surgeon: Nahed Butler MD;  [facility]    HC UGI ENDOSCOPY W EUS  2008    Dr. Pastrana, possible chronic pancreatitis, fatty liver    HERNIA REPAIR  2012    done at [facility]    INSERT INTRAUTERINE DEVICE N/A 2016    Procedure: INSERT INTRAUTERINE DEVICE;  Surgeon: Nahed Butler MD;  [facility]    INT UTERINE FIBRIOD EMBOLIZATION  10/29/2014    LAPAROSCOPIC CHOLECYSTECTOMY  2008    Dr. Arnol GRUBBS TX, CERVICAL  1992    s/p LEERAHUL, done at [facility] in [place].    ORBITOTOMY Right 03/15/2016    Procedure: ORBITOTOMY;  Surgeon: Myron Cyr MD;  [facility]    ORBITOTOMY Right 2017    Procedure: ORBITOTOMY;  RIGHT ORBITOTOMY AND BIOPSY;  Surgeon: Charis Holbrook MD;   [facility]    REMOVE INTRAUTERINE DEVICE N/A 2023    Procedure: Remove intrauterine device,;  Surgeon: Nahed Butler MD;  [facility]    REPAIR PTOSIS Right 2017    Procedure: REPAIR PTOSIS;  RIGHT UPPER LID PTOSIS REPAIR;  Surgeon: Myron Cyr MD;  Location: [facility]    UPPER GI ENDOSCOPY  10/21/2008    mild gastritis, Dr. Rocky CALDERA ECHO HEART XTHORACIC,COMPLETE, W/O DOPPLER  2004    [facility] WNL, EF 60%      Allergies   Allergen Reactions    Amoxicillin-Pot Clavulanate      Unknown possible hives and edema    Amoxicillin-Pot Clavulanate     Artificial Sweetner (Informational Only)  Other (See Comments)     Increased headache    Azithromycin     Clavulanic Acid     Diatrizoate Other (See Comments)     Pt wants this listed because she is allergic to shellfish     Imitrex [Triptans]      Severe face/neck/chest tightness and flushing side effects     Penicillins Hives     Unknown     Pork Allergy      Stomach pain, cramping, diarrhea, itching, nausea and headaches    Shellfish Allergy Hives and Swelling    Shellfish-Derived Products     Sulfa Antibiotics Hives and Swelling    Sulfatolamide     Zithromax [Azithromycin Dihydrate] Swelling     Unknown       Social History     Tobacco Use    Smoking status: Some Days     Current packs/day: 0.00     Average packs/day: 0.2 packs/day for 1 year (0.2 ttl pk-yrs)     Types: Cigarettes     Start date: 2015     Last attempt to quit: 2016     Years since quittin.3    Smokeless tobacco: Never   Substance Use Topics    Alcohol use: No     Alcohol/week: 0.0 standard drinks of alcohol      Wt Readings from Last 1 Encounters:   25 73.4 kg (161 lb 14.4 oz)        Anesthesia Evaluation   Pt has had prior anesthetic.     History of anesthetic complications  - PONV.      ROS/MED HX  ENT/Pulmonary:     (+)                     Intermittent, asthma  Treatment: Inhaler prn,                 Neurologic:     (+)       migraines,    CVA,    TIA,                  Cardiovascular:     (+)  hypertension- -  CAD angina-with excertion.  - -           ROBERTS.                           METS/Exercise Tolerance:     Hematologic:       Musculoskeletal:   (+)  arthritis,             GI/Hepatic:     (+) GERD, Asymptomatic on medication, esophageal disease,                 Renal/Genitourinary:       Endo:     (+)  type II DM,   Using insulin, - not using insulin pump.         Obesity,       Psychiatric/Substance Use:     (+) psychiatric history anxiety and schizophrenia       Infectious Disease:       Malignancy:       Other:              Physical Exam  Airway  Mallampati: unable to assess  Neck ROM: unable to assess  Upper bite lip test: unable to assess  Mouth opening: unable to assess  Comments: Patient c/o headache, per patient physical exam aggravating HA unable to tolerate at this time     Cardiovascular - normal exam   Dental     Pulmonary - normal exam      Neurological - normal exam  She appears awake, alert and oriented x3.    Other Findings       OUTSIDE LABS:  CBC:   Lab Results   Component Value Date    WBC 11.9 (H) 04/10/2025    WBC 14.8 (H) 03/20/2025    HGB 13.6 04/10/2025    HGB 13.7 03/20/2025    HCT 42.1 04/10/2025    HCT 41.3 03/20/2025     04/10/2025     03/20/2025     BMP:   Lab Results   Component Value Date     03/20/2025     06/12/2024    POTASSIUM 3.9 03/20/2025    POTASSIUM 3.4 06/12/2024    CHLORIDE 102 03/20/2025    CHLORIDE 99 06/12/2024    CO2 23 03/20/2025    CO2 26 06/12/2024    BUN 16.7 03/20/2025    BUN 23.9 (H) 06/12/2024    CR 1.01 (H) 04/10/2025    CR 0.96 (H) 03/20/2025     (H) 06/19/2025    GLC 72 03/20/2025     COAGS:   Lab Results   Component Value Date    PTT 30 08/25/2016    INR 0.97 08/25/2016     POC:   Lab Results   Component Value Date     (H) 05/28/2021    HCG Negative 05/24/2021    HCGS Negative 08/04/2017     HEPATIC:   Lab Results   Component Value Date     "ALBUMIN 4.5 04/10/2025    PROTTOTAL 7.3 03/20/2025    ALT 18 04/10/2025    AST 20 04/10/2025    ALKPHOS 81 03/20/2025    BILITOTAL 0.3 03/20/2025     OTHER:   Lab Results   Component Value Date    LACT 1.2 05/24/2021    A1C 7.4 (H) 03/20/2025    KATHY 10.1 03/20/2025    PHOS 3.3 05/28/2021    MAG 1.9 05/28/2021    LIPASE 36 06/21/2023    AMYLASE 60 01/27/2016    TSH 0.40 01/19/2023    T4 1.27 02/04/2022    T3 117 02/08/2022    CRP 9.1 (H) 08/19/2022    SED 21 04/10/2025       Anesthesia Plan    ASA Status:  2      NPO Status: NPO Appropriate   Anesthesia Type: MAC.  Airway: natural airway.  Induction: intravenous.  Maintenance: TIVA.        Consents    Anesthesia Plan(s) and associated risks, benefits, and realistic alternatives discussed. Questions answered and patient/representative(s) expressed understanding.     - Discussed: CRNA, anesthesiologist     - Discussed with:  Patient        - Pt is DNR/DNI Status: no DNR     Blood Consent:      - Discussed with: not discussed.     Postoperative Care    Pain management: multimodal analgesia.     Comments:                   Mary Lora MD    I have reviewed the pertinent notes and labs in the chart from the past 30 days and (re)examined the patient.  Any updates or changes from those notes are reflected in this note.    Clinically Significant Risk Factors Present on Admission                 # Drug Induced Platelet Defect: home medication list includes an antiplatelet medication   # Hypertension: Noted on problem list          # DMII: A1C = N/A within past 6 months    # Overweight: Estimated body mass index is 29.12 kg/m  as calculated from the following:    Height as of 6/5/25: 1.588 m (5' 2.52\").    Weight as of 6/5/25: 73.4 kg (161 lb 14.4 oz).       # Asthma: noted on problem list              "

## 2025-06-19 NOTE — ANESTHESIA POSTPROCEDURE EVALUATION
Patient: Janine Cornell    Procedure: Procedure(s):  Endoscopic ultrasound upper gastrointestinal tract (GI)       Anesthesia Type:  MAC    Note:  Disposition: Outpatient   Postop Pain Control: Uneventful            Sign Out: Well controlled pain   PONV: No   Neuro/Psych: Uneventful            Sign Out: Acceptable/Baseline neuro status   Airway/Respiratory: Uneventful            Sign Out: Acceptable/Baseline resp. status   CV/Hemodynamics: Uneventful            Sign Out: Acceptable CV status; No obvious hypovolemia; No obvious fluid overload   Other NRE: NONE   DID A NON-ROUTINE EVENT OCCUR? No           Last vitals:  Vitals Value Taken Time   /90 06/19/25 12:30   Temp     Pulse     Resp     SpO2 100 % 06/19/25 12:36   Vitals shown include unfiled device data.    Electronically Signed By: Mary Lora MD  June 19, 2025  12:50 PM

## 2025-07-02 DIAGNOSIS — I10 HYPERTENSION, UNSPECIFIED TYPE: ICD-10-CM

## 2025-07-02 DIAGNOSIS — E11.9 TYPE 2 DIABETES, HBA1C GOAL < 8% (H): ICD-10-CM

## 2025-07-02 DIAGNOSIS — I10 BENIGN ESSENTIAL HYPERTENSION: ICD-10-CM

## 2025-07-02 DIAGNOSIS — I25.10 CORONARY ARTERY DISEASE INVOLVING NATIVE HEART WITHOUT ANGINA PECTORIS, UNSPECIFIED VESSEL OR LESION TYPE: ICD-10-CM

## 2025-07-02 RX ORDER — EMPAGLIFLOZIN 25 MG/1
TABLET, FILM COATED ORAL
Qty: 30 TABLET | Refills: 2 | Status: SHIPPED | OUTPATIENT
Start: 2025-07-02

## 2025-07-03 RX ORDER — LISINOPRIL AND HYDROCHLOROTHIAZIDE 20; 25 MG/1; MG/1
1 TABLET ORAL DAILY
Qty: 30 TABLET | OUTPATIENT
Start: 2025-07-03

## 2025-07-03 RX ORDER — METOPROLOL SUCCINATE 100 MG/1
100 TABLET, EXTENDED RELEASE ORAL DAILY
Qty: 30 TABLET | Refills: 2 | Status: SHIPPED | OUTPATIENT
Start: 2025-07-03

## 2025-07-03 RX ORDER — PRAVASTATIN SODIUM 40 MG
40 TABLET ORAL DAILY
Qty: 30 TABLET | Refills: 8 | Status: SHIPPED | OUTPATIENT
Start: 2025-07-03

## 2025-07-03 RX ORDER — CLOPIDOGREL BISULFATE 75 MG/1
75 TABLET ORAL DAILY
Qty: 30 TABLET | Refills: 8 | Status: SHIPPED | OUTPATIENT
Start: 2025-07-03

## 2025-07-03 NOTE — TELEPHONE ENCOUNTER
Last Written Prescription:   Disp Refills Start End [facility]   lisinopril-hydrochlorothiazide (ZESTORETIC) 20-25 MG tablet 30 tablet 3 1/13/2025 -- No   Sig - Route: Take 1 tablet by mouth daily. - Oral   Drug - drug interaction    Disp Refills Start End [facility]   pravastatin (PRAVACHOL) 40 MG tablet 30 tablet 3 1/13/2025 -- No   Sig - Route: TAKE 1 TABLET BY MOUTH EVERY DAY - Oral   9 months refill    Disp Refills Start End [facility]   metoprolol succinate ER (TOPROL XL) 100 MG 24 hr tablet 30 tablet 3 1/13/2025 -- No   Sig - Route: Take 1 tablet (100 mg) by mouth daily. - Oral   90 days - bp elevated   clopidogrel (PLAVIX) 75 MG tablet 30 tablet 3 1/13/2025 -- No   Sig - Route: Take 1 tablet (75 mg) by mouth daily. . - Oral   9 months   ----------------------  Last Visit Date:   3/20/2025  [facility] Visit Date: 0  ----------------------      Refill decision:   [x] Medication refilled per  Medication Refill in Ambulatory Care  policy.  [x] Medication unable to be refilled by RN due to: Drug interaction Warning : lisinopril - hydrochlorothiazide Drug-Drug: spironolactone and lisinopril-hydrochlorothiazide     Last Comprehensive Metabolic Panel:  Lab Results   Component Value Date     03/20/2025    POTASSIUM 3.9 03/20/2025    CHLORIDE 102 03/20/2025    CO2 23 03/20/2025    ANIONGAP 15 03/20/2025     (H) 06/19/2025    BUN 16.7 03/20/2025    CR 1.01 (H) 04/10/2025    GFRESTIMATED 64 04/10/2025    KATHY 10.1 03/20/2025     BP Readings from Last 3 Encounters:   06/19/25 (!) 138/90   06/05/25 (!) 170/98   05/23/25 (!) 144/67     Recent Labs   Lab Test 03/20/25  1146 12/14/23  1414   CHOL 156 145   HDL 38* 36*   LDL 86  91 82  94   TRIG 158* 134     CBC RESULTS:   Recent Labs   Lab Test 04/10/25  0857   WBC 11.9*   RBC 5.26*   HGB 13.6   HCT 42.1   MCV 80   MCH 25.9*   MCHC 32.3   RDW 14.1          BP elevated >140/90, FYI sent to care team per protocol.     Request from  pharmacy:  Requested Prescriptions   Pending Prescriptions Disp Refills    lisinopril-hydrochlorothiazide (ZESTORETIC) 20-25 MG tablet [Pharmacy Med Name: LISINOPRIL-HCTZ 20-25 MG TAB] 30 tablet 3     Sig: TAKE 1 TABLET BY MOUTH EVERY DAY       Diuretics (Including Combos) Protocol Failed - 7/3/2025 10:10 AM        Failed - Most recent blood pressure under 140/90 in past 12 months     BP Readings from Last 3 Encounters:   06/19/25 (!) 138/90   06/05/25 (!) 170/98   05/23/25 (!) 144/67       No data recorded            Passed - Potassium level on file in past 12 months        Passed - Medication is active on med list and the sig matches. RN to manually verify dose and sig if red X/fail.     If the protocol passes (green check), you do not need to verify med dose and sig.    A prescription matches if they are the same clinical intention.    For Example: once daily and every morning are the same.    The protocol can not identify upper and lower case letters as matching and will fail.     For Example: Take 1 tablet (50 mg) by mouth daily     TAKE 1 TABLET (50 MG) BY MOUTH DAILY    For all fails (red x), verify dose and sig.    If the refill does match what is on file, the RN can still proceed to approve the refill request.       If they do not match, route to the appropriate provider.             Passed - Has GFR on file in past 12 months and most recent value is normal        Passed - Recent (12 month) or future (90 days) visit with authorizing provider's specialty (provided they have been seen in the past 15 months)     The patient must have completed an in-person or virtual visit within the past 12 months or has a future visit scheduled within the next 90 days with the authorizing provider s specialty.  Urgent care and e-visits do not qualify as an office visit for this protocol.          Passed - Patient is age 18 or older        Passed - No active pregancy on record        Passed - No positive pregnancy test in  past 12 months       ACE Inhibitors (Including Combos) Protocol Failed - 7/3/2025 10:10 AM        Failed - Most recent blood pressure under 140/90 in past 12 months- Clinicial or Patient Reported     BP Readings from Last 3 Encounters:   06/19/25 (!) 138/90   06/05/25 (!) 170/98   05/23/25 (!) 144/67       No data recorded            Passed - Medication is active on med list and the sig matches. RN to manually verify dose and sig if red X/fail.     If the protocol passes (green check), you do not need to verify med dose and sig.    A prescription matches if they are the same clinical intention.    For Example: once daily and every morning are the same.    The protocol can not identify upper and lower case letters as matching and will fail.     For Example: Take 1 tablet (50 mg) by mouth daily     TAKE 1 TABLET (50 MG) BY MOUTH DAILY    For all fails (red x), verify dose and sig.    If the refill does match what is on file, the RN can still proceed to approve the refill request.       If they do not match, route to the appropriate provider.             Passed - Medication indicated for associated diagnosis     Medication is associated with one or more of the following diagnoses:     Chronic Kidney Disease (CKD)   Coronary Artery Disease (CAD)   Diabetes   Heart Failure (HF)   Hypertension (HTN)   Nephropathy   History of myocarditis   Tachycardia induced cardiomyopathy   STEMI (ST elevation myocardial infarction)   Spontaneous dissection of coronary artery   Status post percutaneous transluminal coronary angioplasty   Cardiomyopathy            Passed - Recent (12 month) or future (90 days) visit with authorizing provider's specialty (provided they have been seen in the past 15 months)     The patient must have completed an in-person or virtual visit within the past 12 months or has a future visit scheduled within the next 90 days with the authorizing provider s specialty.  Urgent care and e-visits do not qualify as  an office visit for this protocol.          Passed - Most recent GFR on file in the past 12 months >30        Passed - Patient is age 18 or older        Passed - No active pregnancy on record        Passed - Normal serum potassium on file in past 12 months     Recent Labs   Lab Test 03/20/25  1146   POTASSIUM 3.9             Passed - No positive pregnancy test within past 12 months          pravastatin (PRAVACHOL) 40 MG tablet [Pharmacy Med Name: PRAVASTATIN SODIUM 40 MG TAB] 30 tablet 3     Sig: TAKE 1 TABLET BY MOUTH EVERY DAY       Antihyperlipidemic agents Passed - 7/3/2025 10:10 AM        Passed - LDL on file in the past 12 months        Passed - Medication is active on med list and the sig matches. RN to manually verify dose and sig if red X/fail.     If the protocol passes (green check), you do not need to verify med dose and sig.    A prescription matches if they are the same clinical intention.    For Example: once daily and every morning are the same.    The protocol can not identify upper and lower case letters as matching and will fail.     For Example: Take 1 tablet (50 mg) by mouth daily     TAKE 1 TABLET (50 MG) BY MOUTH DAILY    For all fails (red x), verify dose and sig.    If the refill does match what is on file, the RN can still proceed to approve the refill request.       If they do not match, route to the appropriate provider.             Passed - Recent (12 month) or future (90 days) visit with authorizing provider's specialty (provided they have been seen in the past 15 months)     The patient must have completed an in-person or virtual visit within the past 12 months or has a future visit scheduled within the next 90 days with the authorizing provider s specialty.  Urgent care and e-visits do not qualify as an office visit for this protocol.          Passed - Patient is age 18 years or older        Passed - No active pregnancy on record        Passed - No positive pregnancy test in past 12  mos          metoprolol succinate ER (TOPROL XL) 100 MG 24 hr tablet [Pharmacy Med Name: METOPROLOL SUCC  MG TAB] 30 tablet 3     Sig: TAKE 1 TABLET BY MOUTH EVERY DAY       Beta-Blockers Protocol Failed - 7/3/2025 10:10 AM        Failed - Most recent blood pressure under 140/90 in past 12 months     BP Readings from Last 3 Encounters:   06/19/25 (!) 138/90   06/05/25 (!) 170/98   05/23/25 (!) 144/67       No data recorded            Passed - Patient is age 6 or older        Passed - Medication is active on med list and the sig matches. RN to manually verify dose and sig if red X/fail.     If the protocol passes (green check), you do not need to verify med dose and sig.    A prescription matches if they are the same clinical intention.    For Example: once daily and every morning are the same.    The protocol can not identify upper and lower case letters as matching and will fail.     For Example: Take 1 tablet (50 mg) by mouth daily     TAKE 1 TABLET (50 MG) BY MOUTH DAILY    For all fails (red x), verify dose and sig.    If the refill does match what is on file, the RN can still proceed to approve the refill request.       If they do not match, route to the appropriate provider.             Passed - Medication indicated for associated diagnosis     Medication is associated with one or more of the following diagnoses:     Hypertension (HTN)   Atrial fibrillation/flutter   Angina   ASCVD   Migraine   Heart Failure   Tremor   Anxiety   Ocular hypertension   Glaucoma   PTSD   Obstructive hypertrophic cardiomyopathy   History of myocarditis   Palpitations   POTS (postural orthostatic tachycardia syndrome)   SVT (supraventricular tachycardia)   Hyperthyroidism   Tachycardia   Acute non-st segment elevation myocardial infarction   Subsequent non-st segment elevation myocardial infarction   Ischemic myocardial dysfunction          Passed - Recent (12 month) or future (90 days) visit with authorizing provider's  specialty (provided they have been seen in the past 15 months)     The patient must have completed an in-person or virtual visit within the past 12 months or has a future visit scheduled within the next 90 days with the authorizing provider s specialty.  Urgent care and e-visits do not qualify as an office visit for this protocol.            clopidogrel (PLAVIX) 75 MG tablet [Pharmacy Med Name: CLOPIDOGREL 75 MG TABLET] 30 tablet 3     Sig: TAKE 1 TABLET BY MOUTH EVERY DAY       Plavix Failed - 7/3/2025 10:10 AM        Failed - Medication indicated for associated diagnosis     Medication is associated with one or more of the following diagnoses:  Cerebrovascular accident  Myocardial infarction  Percutaneous coronary intervention  Peripheral arterial occlusive disease  Transient cerebral ischemia          Passed - Normal HGB on file in past 12 months     Recent Labs   Lab Test 04/10/25  0857   HGB 13.6               Passed - Normal Platelets on file in past 12 months     Recent Labs   Lab Test 04/10/25  0857                  Passed - Medication is active on med list and the sig matches. RN to manually verify dose and sig if red X/fail.     If the protocol passes (green check), you do not need to verify med dose and sig.    A prescription matches if they are the same clinical intention.    For Example: once daily and every morning are the same.    The protocol can not identify upper and lower case letters as matching and will fail.     For Example: Take 1 tablet (50 mg) by mouth daily     TAKE 1 TABLET (50 MG) BY MOUTH DAILY    For all fails (red x), verify dose and sig.    If the refill does match what is on file, the RN can still proceed to approve the refill request.       If they do not match, route to the appropriate provider.             Passed - Recent (12 month) or future (90 days) visit with authorizing provider's specialty (provided they have been seen in the past 15 months)     The patient must have  completed an in-person or virtual visit within the past 12 months or has a future visit scheduled within the next 90 days with the authorizing provider s specialty.  Urgent care and e-visits do not qualify as an office visit for this protocol.          Passed - Patient is age 18 or older        Passed - No active pregnancy on record        Passed - No positive pregnancy test in past 12 months

## 2025-07-05 NOTE — PROGRESS NOTES
HPI:     Ms Janine Cornell, who is a 52 yr -American patient with DM2, Hypothyroidism, PCOS, Dyslipidemia, HTN, and S/PNSTEMI (11/2011) s/p stent placement comes for follow-up.     Patient has a positive RA factor and fibromyalgia.  Patient feels relatively well.  She complaints much less about chest pain and SOB. She denies palpitations.    From cardiovascular point of view - patient feels relatively well and is very happy for this.      PAST MEDICAL HISTORY:  Past Medical History:   Diagnosis Date     Abnormal glandular Papanicolaou smear of cervix 1992     Allergic rhinitis     Allergy, airborne subst     Arthritis      ASCVD (arteriosclerotic cardiovascular disease)      Chronic pain      Chronic pancreatitis (H)     idiopathic, Tx: PPI, antioxidants, pancreatic enzymes     Common migraine      Coronary artery disease      Costochondritis      Costochondritis      Difficulty in walking(719.7)      Dyspnea on exertion      Ectasia, mammary duct     [facility], persistent nipple discharge     Elevated fasting glucose      Gastro-oesophageal reflux disease      Hernia      History of angina      Hyperlipidemia LDL goal < 100      Hypertension goal BP (blood pressure) < 140/90     Essential hypertension     Iron deficiency anemia      Ischemic cardiomyopathy      Menorrhagia      Migraine headaches      Mild persistent asthma      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 Matias, neg w/u, Rx'd w/prednisone     Ventral hernia, unspecified, without mention of obstruction or gangrene 7/2012       CURRENT MEDICATIONS:  Current Outpatient Prescriptions   Medication Sig Dispense Refill     insulin glargine (LANTUS) 100 UNIT/ML injection Inject 40 Units Subcutaneous daily (with breakfast)       ketoconazole (NIZORAL) 2 % cream   3     folic acid (FOLVITE) 1 MG tablet TK 1 T PO D  2     SYMBICORT 80-4.5 MCG/ACT Inhaler INHALE 2 PUFFS PO BID RINSE AND EXPECTORATE AFTER  3     pravastatin (PRAVACHOL) 40 MG tablet Take 1 tablet (40 mg) by mouth daily 90 tablet 0     cyclobenzaprine (FLEXERIL) 10 MG tablet Take 1 tablet (10 mg) by mouth 2 times daily as needed for muscle spasms 180 tablet 0     hydroxychloroquine (PLAQUENIL) 200 MG tablet Take 2 tablets (400 mg) by mouth daily 180 tablet 1     spironolactone (ALDACTONE) 25 MG tablet Take 2 tablets (50 mg) by mouth daily 60 tablet 11     Metoprolol Succinate (TOPROL XL PO) Take 100 mg by mouth daily       insulin pen needle (BD MARCK U/F) 32G X 4 MM USE DAILY OR AS DIRECTED 300 each 3     ranitidine (ZANTAC) 150 MG tablet Take 1 tablet (150 mg) by mouth 2 times daily 60 tablet 2     traMADol (ULTRAM) 50 MG tablet Take 1 tablet (50 mg) by mouth every 8 hours as needed for moderate pain 60 tablet 3     ferrous gluconate (FERGON) 324 (38 FE) MG tablet Take 1 tablet (324 mg) by mouth 2 times daily (Patient taking differently: Take 1 tablet by mouth daily ) 180 tablet 1     vitamin D (ERGOCALCIFEROL) 30184 UNIT capsule Take 1 capsule (50,000 Units) by mouth every 7 days Need a Vitamin D level in 2 months. (Patient taking differently: Take 50,000 Units by mouth every 7 days Need a Vitamin D level in 2 months.) 8 capsule 0     clopidogrel (PLAVIX) 75 MG tablet Take 1 tablet (75 mg) by mouth daily 90 tablet 3      lisinopril-hydrochlorothiazide (PRINZIDE/ZESTORETIC) 20-25 MG per tablet TAKE 1 TABLET BY MOUTH DAILY 90 tablet 3     diphenhydrAMINE (BENADRYL) 25 MG capsule TK 1 TO 2 CS PO QHS  4     EPIPEN 2-RIKY 0.3 MG/0.3ML injection INJECT 0.3 MG INTO THE MUSCLE PRF ANAPHYALAXIS  0     AEROSPAN 80 MCG/ACT AERS INHALE 2 PUFFS PO BID.  RINSE MOUTH AFTERWARDS  4     Magnesium Oxide -Mg Supplement 250 MG TABS TK 1 T PO BID. REDUCE IF STOOLS LOOSEN  11     nystatin (MYCOSTATIN) 314970 UNITS TABS tablet TK 2 TS PO BID  0     albuterol (2.5 MG/3ML) 0.083% neb solution INL 1 VIAL VIA NEBULIZATION Q 4 TO 6 HOURS PRN  1     dicyclomine (BENTYL) 20 MG tablet Take 1 tablet (20 mg) by mouth 4 times daily as needed 60 tablet 5     sucralfate (CARAFATE) 1 GM tablet Take 1 tablet (1 g) by mouth 4 times daily as needed 120 tablet 3     fluocinolone (SYNALAR) 0.01 % solution Apply topically daily as needed       mometasone (NASONEX) 50 MCG/ACT spray Spray 2 sprays into both nostrils daily       Ondansetron HCl (ZOFRAN PO)        metFORMIN (GLUCOPHAGE-XR) 500 MG 24 hr tablet Take 2 tablets (1,000 mg) by mouth daily (with dinner) 60 tablet 11     montelukast (SINGULAIR) 10 MG tablet Take 1 tablet (10 mg) by mouth At Bedtime 30 tablet 1     acetaminophen (TYLENOL) 325 MG tablet Take 1-2 tablets (325-650 mg) by mouth every 6 hours as needed for pain (headache) 250 tablet 0     metroNIDAZOLE (NORITATE) 1 % cream Apply topically daily       desonide (DESOWEN) 0.05 % cream Apply topically 2 times daily       ketoconazole (NIZORAL) 2 % shampoo Apply topically daily as needed       fluocinonide (LIDEX) 0.05 % external solution Apply topically 2 times daily To areas of itch on the scalp as needed. 60 mL 11     nitroglycerin (NITROSTAT) 0.4 MG SL tablet Place 1 tablet (0.4 mg) under the tongue every 5 minutes as needed for chest pain 25 tablet 1     albuterol (PROAIR HFA, PROVENTIL HFA, VENTOLIN HFA) 108 (90 BASE) MCG/ACT inhaler Inhale 2 puffs into the  lungs every 6 hours as needed for shortness of breath / dyspnea or wheezing 3 Inhaler 1     calcium carbonate (TUMS) 500 MG chewable tablet Take 3-4 chew tab by mouth daily as needed.       fexofenadine (ALLEGRA) 180 MG tablet Take 1 tablet by mouth daily as needed. 90 tablet 3     olopatadine (PATANOL) 0.1 % ophthalmic solution Place 1 drop into both eyes 2 times daily as needed for allergies. 5 mL 12     metroNIDAZOLE (METROCREAM) 0.75 % cream CECI EXT TO FACE BID  2     azelastine (ASTELIN) 0.1 % spray U 2 SPRAYS IEN BID  0     BASAGLAR 100 UNIT/ML injection Inject 40 Units Subcutaneous daily (Patient not taking: Reported on 12/20/2017) 12 mL 2     bacitracin ophthalmic ointment Apply to incision line three times daily. (Patient not taking: Reported on 12/20/2017) 3.5 g 0     COMPRESSION STOCKINGS 2 each daily (Patient not taking: Reported on 12/20/2017) 4 each 2     COMPRESSION STOCKINGS 2 each daily Apply one 10-15 mmHg compression stocking to each lower extgmierty during the day and remove before bedtime. (Patient not taking: Reported on 12/20/2017) 4 each 2     insulin glargine (BASAGLAR KWIKPEN) 100 UNIT/ML injection Inject 40 Units Subcutaneous daily (Patient not taking: Reported on 12/20/2017) 18 mL 3     lidocaine (XYLOCAINE) 5 % ointment Apply 1 g topically 2 times daily as needed for moderate pain (Patient not taking: Reported on 12/20/2017) 50 g 5     blood glucose monitoring (ONE TOUCH DELICA) lancets Use to test blood sugars 4 times daily or as directed. (Patient not taking: Reported on 12/20/2017) 4 Box 3     blood glucose monitoring (NO BRAND SPECIFIED) meter device kit Use to test blood sugar 4 X times daily or as directed. (Patient not taking: Reported on 12/20/2017) 1 kit 0     blood glucose monitoring (NO BRAND SPECIFIED) test strip 1 strip by In Vitro route 4 times daily Test as directed. Please dispense three months and three months of refills. Thank you. (Patient not taking: Reported on  2017) 360 each 3       PAST SURGICAL HISTORY:  Past Surgical History:   Procedure Laterality Date     C ECHO HEART XTHORACIC,COMPLETE, W/O DOPPLER  04    [facility] WNL, EF 60%     C/SECTION, LOW TRANSVERSE           CARDIAC SURGERY      cardiac stent- recent in 16; 4 other stents     DILATION AND CURETTAGE N/A 2016    Procedure: DILATION AND CURETTAGE;  Surgeon: Nahed Butler MD;  Location: [facility] OR     HC UGI ENDOSCOPY W EUS  08    Dr. Pastrana, possible chronic pancreatitis, fatty liver     HERNIA REPAIR  2012    done at [facility]     INSERT INTRAUTERINE DEVICE N/A 2016    Procedure: INSERT INTRAUTERINE DEVICE;  Surgeon: Nahed Butler MD;  Location: [facility] OR     INT UTERINE FIBRIOD EMBOLIZATION  10/29/2014     LAPAROSCOPIC CHOLECYSTECTOMY  08    Dr. Arnol GRUBBS TX, CERVICAL      s/p LEEP     ORBITOTOMY Right 3/15/2016    Procedure: ORBITOTOMY;  Surgeon: Myron Cyr MD;  Location:  [facility]     ORBITOTOMY Right 2017    Procedure: ORBITOTOMY;  RIGHT ORBITOTOMY AND BIOPSY;  Surgeon: Charis Holbrook MD;  [facility]     REPAIR PTOSIS Right 2017    Procedure: REPAIR PTOSIS;  RIGHT UPPER LID PTOSIS REPAIR;  Surgeon: Myron Cyr MD;  [facility]     UPPER GI ENDOSCOPY  10/21/08    mild gastritis, Dr. Hidalgo       ALLERGIES     Allergies   Allergen Reactions     Amoxicillin-Pot Clavulanate      Augmentin      Unknown possible hives and edema     Azithromycin      Diatrizoate Other (See Comments)     Pt wants this listed because she is allergic to shellfish      Imitrex [Sumatriptan]      Severe face/neck/chest tightness and flushing side effects      Penicillins Hives     Unknown      Pork Allergy      Stomach pain, cramping, diarrhea, itching, nausea and headaches     Shellfish Allergy Hives and Swelling     Sulfa Drugs Hives and Swelling     Zithromax [Azithromycin Dihydrate] Swelling     Unknown        FAMILY HISTORY:  Family  History   Problem Relation Age of Onset     HEART DISEASE Father 50     heart attack     CEREBROVASCULAR DISEASE Father      DIABETES Father      Hypertension Father      Depression Father      C.A.D. Father      Hypertension Mother      Arthritis Mother      HEART DISEASE Mother      long qt syndrome     Thyroid Disease Mother      C.A.D. Mother      HEART DISEASE Brother 15     MI at 15, 16.      DIABETES Maternal Uncle      Hypertension Maternal Aunt      Hypertension Maternal Uncle      Arthritis Brother      he passed away, had severe arthritis at age 11     Arthritis Maternal Uncle      Eye Disorder Maternal Uncle      cataracts     Neurologic Disorder Sister      migraines     Neurologic Disorder Sister      migraines     Respiratory Son      asthma     CEREBROVASCULAR DISEASE Maternal Uncle      C.A.D. Brother      Family History Negative Other      neg for RA, SLE     Unknown/Adopted No family hx of      unknown neurological issues in her family, mother was adopted     Skin Cancer No family hx of      No known family hx of skin cancer       SOCIAL HISTORY:  Social History     Social History     Marital status: Single     Spouse name: N/A     Number of children: 1     Years of education: N/A     Occupational History      None      Social History Main Topics     Smoking status: Former Smoker     Packs/day: 0.20     Years: 1.00     Types: Cigarettes     Quit date: 2016     Smokeless tobacco: Never Used     Alcohol use No     Drug use: No     Sexual activity: Not Currently     Other Topics Concern     Parent/Sibling W/ Cabg, Mi Or Angioplasty Before 65f 55m? Yes     Social History Narrative    Balanced Diet - sometimes    Osteoporosis Prevention Measures - Dairy servings per day: 2 servings weekly    Regular Exercise -  Yes Describe walking 4 times a week    Dental Exam - NO    Seatbelts used - Yes    Self Breast Exam - Yes    Abuse: Current or Past (Physical, Sexual or Emotional)- No    Do you have  "any concerns about STD's -  No    Do you feel safe in your environment - No    Guns stored in the home - No    Sunscreen used - Yes    Lipids -  YES - Date: 053102    Glucose -  YES - Date: 012804    Eye Exam - YES - Date: one year ago    Colon Cancer Screening - No    Hemoccults - NO    Pap Test -  YES - Date: 070904, remote history of LEEP    Mammography - YES - Date: last spring, would like to discuss, needs [facility] to [facility]    DEXA - NO    Immunizations reviewed and up to date - Yes, last td given in 1997 or 1998       ROS:   Constitutional: No fever, chills, or sweats. No weight gain/loss   ENT: No visual disturbance, ear ache, epistaxis, sore throat  Allergies/Immunologic: Negative.   Respiratory: No cough, hemoptysia  Cardiovascular: As per HPI  GI: No nausea, vomiting, hematemesis, melena, or hematochezia  : No urinary frequency, dysuria, or hematuria  Integument: Negative  Psychiatric: Negative  Neuro: Negative  Endocrinology: Negative   Musculoskeletal: Negative    EXAM:  /85 (BP Location: Left arm, Patient Position: Chair, Cuff Size: Adult Large)  Pulse 63  Ht 1.6 m (5' 3\")  Wt 78.2 kg (172 lb 8 oz)  SpO2 100%  BMI 30.56 kg/m2  In general, the patient is a pleasant female in no apparent distress.    HEENT: NC/AT.  PERRLA.  EOMI.  Sclerae white, not injected.  Nares clear.  Pharynx without erythema or exudate.  Dentition intact.    Neck: No adenopathy.  No thyromegaly. Carotids +4/4 bilaterally without bruits.  No jugular venous distension.   Heart: RRR. Normal S1, S2 splits physiologically. No murmur, rub, click, or gallop. The PMI is in the 5th ICS in the midclavicular line. There is no heave.    Lungs: CTA.  No ronchi, wheezes, rales.  No dullness to percussion.   Abdomen: Soft, nontender, nondistended. No organomegaly.  No bruits.   Extremities: No clubbing, cyanosis, or edema.  The pulses are +4/4 at the radial, brachial, femoral, popliteal, DP, and PT sites " bilaterally.  No bruits are noted.  Neurologic: Alert and oriented to person/place/time, normal speech, gait and affect  Skin: No petechiae, purpura or rash.    Labs:       Ref. Range 12/20/2017 15:39   Sodium Latest Ref Range: 133 - 144 mmol/L 136   Potassium Latest Ref Range: 3.4 - 5.3 mmol/L 3.5   Chloride Latest Ref Range: 94 - 109 mmol/L 102   Carbon Dioxide Latest Ref Range: 20 - 32 mmol/L 26   Urea Nitrogen Latest Ref Range: 7 - 30 mg/dL 17   Creatinine Latest Ref Range: 0.52 - 1.04 mg/dL 0.96   GFR Estimate Latest Ref Range: >60 mL/min/1.7m2 61   GFR Estimate If Black Latest Ref Range: >60 mL/min/1.7m2 74   Calcium Latest Ref Range: 8.5 - 10.1 mg/dL 8.6   Anion Gap Latest Ref Range: 3 - 14 mmol/L 8   Albumin Latest Ref Range: 3.4 - 5.0 g/dL 3.7   Protein Total Latest Ref Range: 6.8 - 8.8 g/dL 7.1   Bilirubin Total Latest Ref Range: 0.2 - 1.3 mg/dL 0.3   Alkaline Phosphatase Latest Ref Range: 40 - 150 U/L 67   ALT Latest Ref Range: 0 - 50 U/L 23   AST Latest Ref Range: 0 - 45 U/L 18   Cholesterol Latest Ref Range: <200 mg/dL 138   HDL Cholesterol Latest Ref Range: >49 mg/dL 41 (L)   LDL Cholesterol Calculated Latest Ref Range: <100 mg/dL 56   Non HDL Cholesterol Latest Ref Range: <130 mg/dL 96   Triglycerides Latest Ref Range: <150 mg/dL 205 (H)   Glucose Latest Ref Range: 70 - 99 mg/dL 166 (H)            Assessment and Plan:     We discussed results with patient- we re-emphasized the importance of a heart healthy diet and lifestyle.  Lipids are well controlled - except elevated triglycerides which goes together with a less controlled diabetes.  We discussed with her to pay special attention to improve her diabetes diet.    We continue with same medical regimen - weasked patient o check her dosage of metoprolol what she was taking.  Follow-up within 6 months.      Milan Peace MD, PhD  Professor of Medicine  Division of Cardiology        CC  Patient Care Team:  Kash Solano MD as PCP - General  (Family Practice)  Milan Peace MD as MD (Cardiology)  Lauren Francis, RN as Nurse Coordinator (Cardiology)  Majo Mckinnon MD as MD (Rheumatology)  Jas Vernon MD as MD (Ophthalmology)  Jas Vernon MD as Referring Physician (Ophthalmology)  Charis Holbrook MD as Resident (Ophthalmology)  DARLING THOMPSON   Opt out

## 2025-07-08 ENCOUNTER — TELEPHONE (OUTPATIENT)
Dept: CARDIOLOGY | Facility: CLINIC | Age: 60
End: 2025-07-08
Payer: COMMERCIAL

## 2025-07-08 NOTE — TELEPHONE ENCOUNTER
Medications Requested:  Nont indicated    ----------------------  Last Visit Date: 3/20/2025  [facility]      Future Visit Date: 0  ----------------------      Refill decision:   [x] Medication unable to be refilled by RN due to: Other: Care coordination needed, routed to care team for follow-upt.     Per pt call: Reason for Call: Other: Pt states that she was just in to see Dr. Peace a few months ago and all her meds were supposed to be refilled.  Now the pharmacy is stating that some of them are being denied.  Since she deals w/this every few months she would like someone to fix this for her so she has refills for the full year.          Request from pharmacy:  Requested Prescriptions   N/a

## 2025-07-08 NOTE — TELEPHONE ENCOUNTER
Writer called pt. No answer. LVM asking for pt to return call to clinic and specify which medications she is needing refilled.

## 2025-07-08 NOTE — TELEPHONE ENCOUNTER
[facility] Call Center    Phone Message    May a detailed message be left on voicemail: yes     Reason for Call: Other: Pt states that she was just in to see Dr. Peace a few months ago and all her meds were supposed to be refilled.  Now the pharmacy is stating that some of them are being denied.  Since she deals w/this every few months she would like someone to fix this for her so she has refills for the full year.       Action Taken: Message routed to:  [facility] cardio    Travel Screening: Not Applicable    Thank you!  [facility]       Date of Service:

## 2025-07-09 NOTE — TELEPHONE ENCOUNTER
Writer returned call to patient at number listed in chart. Patient unavailable. Left voicemail for patient requesting a callback to clarify which medications she is needing refilled.     ISHA GargN, RN  RN Care Coordinator - General Cardiology   [facility] [place]

## 2025-07-10 DIAGNOSIS — I10 HYPERTENSION GOAL BP (BLOOD PRESSURE) < 140/90: ICD-10-CM

## 2025-07-10 DIAGNOSIS — I10 HYPERTENSION, UNSPECIFIED TYPE: ICD-10-CM

## 2025-07-10 RX ORDER — SPIRONOLACTONE 25 MG/1
TABLET ORAL
Qty: 135 TABLET | Refills: 2 | Status: SHIPPED | OUTPATIENT
Start: 2025-07-10

## 2025-07-10 RX ORDER — LISINOPRIL AND HYDROCHLOROTHIAZIDE 20; 25 MG/1; MG/1
1 TABLET ORAL DAILY
Qty: 90 TABLET | Refills: 2 | Status: SHIPPED | OUTPATIENT
Start: 2025-07-10

## 2025-07-10 RX ORDER — LISINOPRIL AND HYDROCHLOROTHIAZIDE 20; 25 MG/1; MG/1
1 TABLET ORAL DAILY
Qty: 30 TABLET | Refills: 3 | OUTPATIENT
Start: 2025-07-10

## 2025-07-10 NOTE — TELEPHONE ENCOUNTER
[facility] Call Center    Phone Message    May a detailed message be left on voicemail: yes     Reason for Call: Other: The patient said that she is requesting all Heart related medication and she would like prescribed so she can pick them up on the same day at the same time. The patient said it is inconvenient to have to go back to the pharmacy multiple times and request a full year supply in less she needs to return to the clinic in 6 months.    pravastatin   metoprolol succinate ER   clopidogrel   spironolactone   nitroGLYcerin   lisinopril-hydrochlorothiazide      Action Taken: Other: Cardiology    Travel Screening: Not Applicable     Thank you!  [facility]      Date of Service:

## 2025-07-10 NOTE — TELEPHONE ENCOUNTER
Writer sent refills for spironolactone and lisinopril-hydrochlorothiazide. All other cardiac meds have refills that were sent through on 7/3 by refill team.

## 2025-07-16 ENCOUNTER — OFFICE VISIT (OUTPATIENT)
Dept: GASTROENTEROLOGY | Facility: CLINIC | Age: 60
End: 2025-07-16
Payer: COMMERCIAL

## 2025-07-16 VITALS
SYSTOLIC BLOOD PRESSURE: 120 MMHG | OXYGEN SATURATION: 98 % | BODY MASS INDEX: 27.82 KG/M2 | HEIGHT: 63 IN | WEIGHT: 157 LBS | HEART RATE: 58 BPM | DIASTOLIC BLOOD PRESSURE: 80 MMHG

## 2025-07-16 DIAGNOSIS — R10.13 CHRONIC EPIGASTRIC PAIN: ICD-10-CM

## 2025-07-16 DIAGNOSIS — K21.9 GASTROESOPHAGEAL REFLUX DISEASE, UNSPECIFIED WHETHER ESOPHAGITIS PRESENT: ICD-10-CM

## 2025-07-16 DIAGNOSIS — K86.1 CHRONIC PANCREATITIS, UNSPECIFIED PANCREATITIS TYPE (H): Primary | ICD-10-CM

## 2025-07-16 DIAGNOSIS — R14.0 ABDOMINAL BLOATING: ICD-10-CM

## 2025-07-16 DIAGNOSIS — G89.29 CHRONIC EPIGASTRIC PAIN: ICD-10-CM

## 2025-07-16 DIAGNOSIS — K86.89 PANCREATIC MASS: ICD-10-CM

## 2025-07-16 DIAGNOSIS — R11.0 NAUSEA: ICD-10-CM

## 2025-07-16 DIAGNOSIS — R68.81 EARLY SATIETY: ICD-10-CM

## 2025-07-16 DIAGNOSIS — K26.9 DUODENAL ULCER: ICD-10-CM

## 2025-07-16 PROCEDURE — 1125F AMNT PAIN NOTED PAIN PRSNT: CPT | Performed by: STUDENT IN AN ORGANIZED HEALTH CARE EDUCATION/TRAINING PROGRAM

## 2025-07-16 PROCEDURE — 3074F SYST BP LT 130 MM HG: CPT | Performed by: STUDENT IN AN ORGANIZED HEALTH CARE EDUCATION/TRAINING PROGRAM

## 2025-07-16 PROCEDURE — 99215 OFFICE O/P EST HI 40 MIN: CPT | Performed by: STUDENT IN AN ORGANIZED HEALTH CARE EDUCATION/TRAINING PROGRAM

## 2025-07-16 PROCEDURE — 3079F DIAST BP 80-89 MM HG: CPT | Performed by: STUDENT IN AN ORGANIZED HEALTH CARE EDUCATION/TRAINING PROGRAM

## 2025-07-16 PROCEDURE — 99417 PROLNG OP E/M EACH 15 MIN: CPT | Performed by: STUDENT IN AN ORGANIZED HEALTH CARE EDUCATION/TRAINING PROGRAM

## 2025-07-16 RX ORDER — ESOMEPRAZOLE MAGNESIUM 40 MG/1
40 CAPSULE, DELAYED RELEASE ORAL
Qty: 90 CAPSULE | Refills: 3 | Status: SHIPPED | OUTPATIENT
Start: 2025-07-16

## 2025-07-16 ASSESSMENT — PAIN SCALES - GENERAL: PAINLEVEL_OUTOF10: MODERATE PAIN (5)

## 2025-07-16 NOTE — PROGRESS NOTES
GI CLINIC VISIT    CC: Follow up     ASSESSMENT/PLAN:    1. Chronic pancreatitis, unspecified pancreatitis type (H)   2. Pancreatic mass  3. Nausea  4. Early satiety  5. Abdominal bloating    Janine has had longstanding bandlike upper abdominal and back pain. It is worse after eating and drinking. She has associated abdominal bloating, distension, early satiety, and nausea.     She has history of chronic pancreatitis and was seen by Dr. Julio César Marcus in 2009. MRCP with secretin at the time showed subtle evidence of chronic pancreatitis. She was given pancreatic enzymes (Creon) and PPI. She developed significant side effects which she believes was related to pork ingredient of medication and is vegan, so discontinued. She lost follow up thereafter. She was hospitalized in 2021 with acute on chronic pancreatitis. There was an abnormality noted on MRI pancreas at the time and she was to follow up outpatient for EUS however she lost follow up. Repeat MRI pancreas on 2/21/2024 showed findings of chronic pancreatitis with masslike thickening of the pancreatic head which has decreased in size compared to the prior exam 5/25/2021. Minimal pancreatic ductal dilatation after administration of secretin. Fat soluble vitamins A, E and K were WNL.     A follow up EUS by Dr. Earl 6/2025 showed no focal change in the head of the pancreas noted, no ductal dilation, no biopsy was performed. It noted duodenal erosions without bleeding, unlikely to be causing abdominal pain. Could consider MRI/MRCP in 6-12 months.    She previously was not interested in pain management referral or celiac plexus block.     She had a colonoscopy in May 2023 which was unrevealing.     Fecal elastase in 2023 was at the lower limit of normal at 206. I discussed with [facility] pharmacy who suggested Pancreatic VegEnzymes as a vegan option for her but these are low dose of lipase (1 capsule has only 5,000 units lipase) and not FDA approved.    6.  Gastroesophageal reflux disease, unspecified whether esophagitis present  7. Duodenal ulcer  8. Chronic epigastric pain  Her recent EUS 6/2025 noted duodenal erosions without bleeding, unlikely to be causing abdominal pain. She has been off of esomeprazole for several months without bothersome acid reflux. She notes one of the only helpful treatments in the past was a GI cocktail, so we discussed restarting esomeprazole 40 mg once daily.     NM GES on 1/26/2024 was normal. We discussed adhering to a low fat, low fiber diet due to her chronic pancreatitis and early satiety.    I recommended the following:   - Recommend rechecking fecal elastase vs. Trial on an alternative pancreatic enzyme. I will discuss with [facility] pharmacy for any vegan options or formulations for her.   - I will discuss case with panc/bili team   - Follow a low fat/low fiber diet   - Restart esomeprazole 40 mg once daily       - esomeprazole (NEXIUM) 40 MG DR capsule; Take 1 capsule (40 mg) by mouth every morning (before breakfast). Take 30-60 minutes before eating.  Dispense: 90 capsule; Refill: 3  - Med Therapy Management Referral    The longitudinal plan of care for the diagnosis(es)/condition(s) as documented were addressed during this visit. Due to the added complexity in care, I will continue to support Janine in the subsequent management and with ongoing continuity of care.    RTC 3 months    118 minutes spent on the date of the encounter doing chart review, history and exam, documentation and further activities per the note.    Lilia Mariscal PA-C  Division of Gastroenterology, Hepatology and Nutrition  [facility]    ---------------------------------------------------------------------------------------------------  HPI:  Janine Cornell is a 60 year old female with past medical history significant for chronic pancreatitis, NSTEMI, moderate persistent asthma, type 2 diabetes, GERD, duodenal ulcer 2022, PCOS, uterine fibroid,  seronegative rheumatoid arthritis, ANCA-associated vasculitis who presents for follow up on upper abdominal pain, chronic pancreatitis. She was previously following with Benjamin Hyman PA-C with last visit in 2023.    She reports having abdominal symptoms for over 20 years. She has tried multiple medications and has seen many providers without resolution. She typically has epigastric abdominal pain that radiates to her back. She wakes up every day with nausea.  She also reports abdominal bloating, distension and early satiety. Most foods upset her stomach and worsen these symptoms.     Upper endoscopy from May 2023 did note mild chronic inactive gastritis however was without ulcer disease. This was thought to be related to NSAID use which she has since stopped completely without much change.     She had a colonoscopy in May 2023 which was unrevealing.      She also had a gastric emptying scan on 1/26/2024 which was normal.     Interval history 7/2025:   She underwent EUS 6/2025 by Dr. Earl for chronic pancreatitis and pancreatic head mass. There was no focal change in the head of the pancreas noted, no ductal dilation, no biopsy was performed. It noted duodenal erosions without bleeding, unlikely to be causing abdominal pain. Recommended considering MRI/MRCP in 6-12 months to follow prior changes though interpretation will remain challenging in the background of chronic pancreatitis. Given report of significant pain, she was offered referral to pancreas clinic to consider if TPIAT or other pancreatic interventions or surgery is feasable but she prefers not to consider at this point and wants medical/pain management only.     Today, she reports 90% of the time she is in pain across her upper abdomen and it feels like she has a tight band wrapping around her upper abdomen and mid back. She has bloating and distension, states she looks pregnant. These symptoms are present all the time, worse after drinking liquids or  eating. Sugar seems to make it more uncomfortable. Her bowel movements are anywhere from 3-6 stools per day. Stools are sometimes formed, soft or watery. Notes a lot of mucus in the stool. Denies seeing any oil in the toilet bowl. Denies blood in the stool.     She has magnesium oxide 250 mg BID on her medication list prescribed by rheumatology. She hasn't been taking magnesium oxide as often over the last couple of months. She stopped many of her supplements to see if she would feel better.    She reports she has been feeling full quickly no matter what she is eating. Feels like her organs are pushing up on her stomach. She reports she becomes overly hungry by the time she eats, and forces herself to eat due to diabetes. She eats 1-3 times per day. Reports feeling nauseous everyday. Has had vomiting for the past 3 weeks. She uses zofran as needed, almost daily and sometimes BID.     She states she drinks alcohol very minimally, because when she does she develops intense epigastric pain that only responds to the weight and warmth of a rice/heating pad on her upper abdomen.    Previous treatments tried:  - Zofran and Compazine as needed which temporarily help.    - Dicyclomine, not helpful  - She has been on PPIs off-and-on over the years without much benefit to her chronic pain.    - Pepcid, sucralfate without significant improvement.    - She recalls a GI cocktail was one of the only helpful regimens she has tried.   - She had been on esomeprazole, helped with reflux, but not with the pain. Stopped it and hasn't had much reflux in the last 6 months.   - She tried pancreatic enzymes in the past. Reports she became violently ill (vomiting, sweating, nausea, dizziness and muscle cramping) and feels it was from pork ingredient in the medication. States she doesn't tolerate pork.   - She is s/p CCY.     ROS:    A 10 point review of systems was performed and is negative except as noted in the HPI.     PHYSICAL  "EXAMINATION:  Vitals /80   Pulse 58   Ht 1.588 m (5' 2.5\")   Wt 71.2 kg (157 lb)   LMP  (LMP Unknown)   SpO2 98%   BMI 28.26 kg/m     Wt   Wt Readings from Last 2 Encounters:   07/16/25 71.2 kg (157 lb)   06/05/25 73.4 kg (161 lb 14.4 oz)      Gen: Pt sitting up on exam table in NAD, interactive and cooperative on exam  Eyes: sclerae anicteric, no injection  Resp: Unlabored breathing   GI: Normoactive BS, abd soft, tender throughout entire upper abdomen, negative Carnett's sign, no organomegaly or masses palpated  Skin: Warm, dry, no jaundice, nails appear healthy  Neuro: alert, oriented, answers questions appropriately    "

## 2025-07-16 NOTE — NURSING NOTE
"Do you have a history of colon cancer in your immediate family? NO    If yes who: negative     And what age  were they diagnosed: n.a      Chief Complaint   Patient presents with    Follow Up       Vitals:    07/16/25 1308   BP: 120/80   Pulse: 58   SpO2: 98%   Weight: 71.2 kg (157 lb)   Height: 1.588 m (5' 2.5\")       Body mass index is 28.26 kg/m .    Claudia Lai MA    "

## 2025-07-16 NOTE — PATIENT INSTRUCTIONS
It was a pleasure meeting with you today and discussing your healthcare plan. Below is a summary of what we covered:    - MTM pharmacy referral placed   - I will talk with our pancreas team and get back to you   - Restart esomeprazole 40 mg once daily 30-60 minutes before breakfast daily   - Follow up in 3 months, sooner if needed  -  If you have any questions, please don't hesitate to contact me through our GI RN Clinic Coordinator, Christie Sesay, at [phone].     Please see below for any additional questions and scheduling guidelines.    Sign up for [facility]: [facility] patient portal serves as a secure platform for accessing your medical records from the [facility]. Additionally, [employer] facilitates easy, timely, and secure messaging with your care team. If you have not signed up, you may do so by using the provided code or calling [phone].    Coordinating your care after your visit:  There are multiple options for scheduling your follow-up care based on your provider's recommendation.    How do I schedule a follow-up clinic appointment:   After your appointment, you may receive scheduling assistance with the Clinic Coordinators by having a seat in the waiting room and a Clinic Coordinator will call you up to schedule.  Virtual visits or after you leave the clinic:  Your provider has placed a follow-up order in the [facility] portal for scheduling your return appointment. A member of the scheduling team will contact you to schedule.  [employer] Scheduling: Timely scheduling through [employer] is advised to ensure appointment availability.   Call to schedule: You may schedule your follow-up appointment(s) by calling [phone], option 1.    How do I schedule my endoscopy or colonoscopy procedure:  If a procedure, such as a colonoscopy or upper endoscopy was ordered by your provider, the scheduling team will contact you to schedule this procedure. Or you may choose to call to schedule at   [phone],  option 2.  Please allow 20-30 minutes when scheduling a procedure.    How do I get my blood work done? To get your blood work done, you need to schedule a lab appointment at an [facility]. There are multiple ways to schedule:   At the clinic: The Clinic Coordinator you meet after your visit can help you schedule a lab appointment.   [email or web address] scheduling: [email or web address] offers online lab scheduling at all [facility] laboratory locations.   Call to schedule: You can call [phone] to schedule your lab appointment.    How do I schedule my imaging study: To schedule imaging studies, such as CT scans, ultrasounds, MRIs, or X-rays, contact Imaging Services at [phone].    How do I schedule a referral to another doctor: If your provider recommended a referral to another specialist(s), the referral order was placed by your provider. You will receive a phone call to schedule this referral, or you may choose to call the number attached to the referral to self-schedule.    For Post-Visit Question(s):  For any inquiries following today's visit:  Please utilize [email or web address] messaging and allow 48 hours for reply or contact the Call Center during normal business hours at [phone], option 3.  For Emergent After-hours questions, contact the On-Call GI Fellow through the [facility]  at [phone].  In addition, you may contact your Nurse directly using the provided contact information.    Test Results:  Test results will be accessible via [email or web address] in compliance with the 21st Century Cures Act. This means that your results will be available to you at the same time as your provider. Often you may see your results before your provider does. Results are reviewed by staff within two weeks with communication follow-up. Results may be released in the patient portal prior to your care team review.    Prescription Refill(s):  Medication prescribed by your provider will be addressed during your visit.  For future refills, please coordinate with your pharmacy. If you have not had a recent clinic visit or routine labs, for your safety, your provider may not be able to refill your prescription.

## 2025-07-16 NOTE — LETTER
7/16/2025      Janine Cornell  [address] [facility] [address]      Dear Colleague,    Thank you for referring your patient, Janine Cornell, to the [facility] [place]. Please see a copy of my visit note below.    GI CLINIC VISIT    CC: Follow up     ASSESSMENT/PLAN:    1. Chronic pancreatitis, unspecified pancreatitis type (H)   2. Pancreatic mass  3. Nausea  4. Early satiety  5. Abdominal bloating    Janine has had longstanding bandlike upper abdominal and back pain. It is worse after eating and drinking. She has associated abdominal bloating, distension, early satiety, and nausea.     She has history of chronic pancreatitis and was seen by Dr. Julio César Marcus in 2009. MRCP with secretin at the time showed subtle evidence of chronic pancreatitis. She was given pancreatic enzymes (Creon) and PPI. She developed significant side effects which she believes was related to pork ingredient of medication and is vegan, so discontinued. She lost follow up thereafter. She was hospitalized in 2021 with acute on chronic pancreatitis. There was an abnormality noted on MRI pancreas at the time and she was to follow up outpatient for EUS however she lost follow up. Repeat MRI pancreas on 2/21/2024 showed findings of chronic pancreatitis with masslike thickening of the pancreatic head which has decreased in size compared to the prior exam 5/25/2021. Minimal pancreatic ductal dilatation after administration of secretin. Fat soluble vitamins A, E and K were WNL.     A follow up EUS by Dr. Earl 6/2025 showed no focal change in the head of the pancreas noted, no ductal dilation, no biopsy was performed. It noted duodenal erosions without bleeding, unlikely to be causing abdominal pain. Could consider MRI/MRCP in 6-12 months.    She previously was not interested in pain management referral or celiac plexus block.     She had a colonoscopy in May 2023 which was unrevealing.     Fecal elastase in  2023 was at the lower limit of normal at 206. I discussed with [facility] pharmacy who suggested Pancreatic VegEnzymes as a vegan option for her but these are low dose of lipase (1 capsule has only 5,000 units lipase) and not FDA approved.    6. Gastroesophageal reflux disease, unspecified whether esophagitis present  7. Duodenal ulcer  8. Chronic epigastric pain  Her recent EUS 6/2025 noted duodenal erosions without bleeding, unlikely to be causing abdominal pain. She has been off of esomeprazole for several months without bothersome acid reflux. She notes one of the only helpful treatments in the past was a GI cocktail, so we discussed restarting esomeprazole 40 mg once daily.     NM GES on 1/26/2024 was normal. We discussed adhering to a low fat, low fiber diet due to her chronic pancreatitis and early satiety.    I recommended the following:   - Recommend rechecking fecal elastase vs. Trial on an alternative pancreatic enzyme. I will discuss with [facility] pharmacy for any vegan options or formulations for her.   - I will discuss case with panc/bili team   - Follow a low fat/low fiber diet   - Restart esomeprazole 40 mg once daily       - esomeprazole (NEXIUM) 40 MG DR capsule; Take 1 capsule (40 mg) by mouth every morning (before breakfast). Take 30-60 minutes before eating.  Dispense: 90 capsule; Refill: 3  - Med Therapy Management Referral    The longitudinal plan of care for the diagnosis(es)/condition(s) as documented were addressed during this visit. Due to the added complexity in care, I will continue to support Janine in the subsequent management and with ongoing continuity of care.    RTC 3 months    118 minutes spent on the date of the encounter doing chart review, history and exam, documentation and further activities per the note.    Lilia Mariscal PA-C  Division of Gastroenterology, Hepatology and Nutrition  [facility]  [place]    ---------------------------------------------------------------------------------------------------  HPI:  Janine Cornell is a 60 year old female with past medical history significant for chronic pancreatitis, NSTEMI, moderate persistent asthma, type 2 diabetes, GERD, duodenal ulcer 2022, PCOS, uterine fibroid, seronegative rheumatoid arthritis, ANCA-associated vasculitis who presents for follow up on upper abdominal pain, chronic pancreatitis. She was previously following with Benjamin Hyman PA-C with last visit in 2023.    She reports having abdominal symptoms for over 20 years. She has tried multiple medications and has seen many providers without resolution. She typically has epigastric abdominal pain that radiates to her back. She wakes up every day with nausea.  She also reports abdominal bloating, distension and early satiety. Most foods upset her stomach and worsen these symptoms.     Upper endoscopy from May 2023 did note mild chronic inactive gastritis however was without ulcer disease. This was thought to be related to NSAID use which she has since stopped completely without much change.     She had a colonoscopy in May 2023 which was unrevealing.      She also had a gastric emptying scan on 1/26/2024 which was normal.     Interval history 7/2025:   She underwent EUS 6/2025 by Dr. Earl for chronic pancreatitis and pancreatic head mass. There was no focal change in the head of the pancreas noted, no ductal dilation, no biopsy was performed. It noted duodenal erosions without bleeding, unlikely to be causing abdominal pain. Recommended considering MRI/MRCP in 6-12 months to follow prior changes though interpretation will remain challenging in the background of chronic pancreatitis. Given report of significant pain, she was offered referral to pancreas clinic to consider if TPIAT or other pancreatic interventions or surgery is feasable but she prefers not to consider at this point and wants  medical/pain management only.     Today, she reports 90% of the time she is in pain across her upper abdomen and it feels like she has a tight band wrapping around her upper abdomen and mid back. She has bloating and distension, states she looks pregnant. These symptoms are present all the time, worse after drinking liquids or eating. Sugar seems to make it more uncomfortable. Her bowel movements are anywhere from 3-6 stools per day. Stools are sometimes formed, soft or watery. Notes a lot of mucus in the stool. Denies seeing any oil in the toilet bowl. Denies blood in the stool.     She has magnesium oxide 250 mg BID on her medication list prescribed by rheumatology. She hasn't been taking magnesium oxide as often over the last couple of months. She stopped many of her supplements to see if she would feel better.    She reports she has been feeling full quickly no matter what she is eating. Feels like her organs are pushing up on her stomach. She reports she becomes overly hungry by the time she eats, and forces herself to eat due to diabetes. She eats 1-3 times per day. Reports feeling nauseous everyday. Has had vomiting for the past 3 weeks. She uses zofran as needed, almost daily and sometimes BID.     She states she drinks alcohol very minimally, because when she does she develops intense epigastric pain that only responds to the weight and warmth of a rice/heating pad on her upper abdomen.    Previous treatments tried:  - Zofran and Compazine as needed which temporarily help.    - Dicyclomine, not helpful  - She has been on PPIs off-and-on over the years without much benefit to her chronic pain.    - Pepcid, sucralfate without significant improvement.    - She recalls a GI cocktail was one of the only helpful regimens she has tried.   - She had been on esomeprazole, helped with reflux, but not with the pain. Stopped it and hasn't had much reflux in the last 6 months.   - She tried pancreatic enzymes in the  "past. Reports she became violently ill (vomiting, sweating, nausea, dizziness and muscle cramping) and feels it was from pork ingredient in the medication. States she doesn't tolerate pork.   - She is s/p CCY.     ROS:    A 10 point review of systems was performed and is negative except as noted in the HPI.     PHYSICAL EXAMINATION:  Vitals /80   Pulse 58   Ht 1.588 m (5' 2.5\")   Wt 71.2 kg (157 lb)   LMP  (LMP Unknown)   SpO2 98%   BMI 28.26 kg/m     Wt   Wt Readings from Last 2 Encounters:   07/16/25 71.2 kg (157 lb)   06/05/25 73.4 kg (161 lb 14.4 oz)      Gen: Pt sitting up on exam table in NAD, interactive and cooperative on exam  Eyes: sclerae anicteric, no injection  Resp: Unlabored breathing   GI: Normoactive BS, abd soft, tender throughout entire upper abdomen, negative Carnett's sign, no organomegaly or masses palpated  Skin: Warm, dry, no jaundice, nails appear healthy  Neuro: alert, oriented, answers questions appropriately      Again, thank you for allowing me to participate in the care of your patient.        Sincerely,        Lilia Mariscal PA-C    Electronically signed"

## 2025-07-18 ENCOUNTER — TELEPHONE (OUTPATIENT)
Dept: FAMILY MEDICINE | Facility: CLINIC | Age: 60
End: 2025-07-18
Payer: COMMERCIAL

## 2025-07-18 NOTE — TELEPHONE ENCOUNTER
MTM referral from: [facility] visit (referral by provider)    MTM referral outreach attempt #2 on July 18, 2025 at 1:33 PM      Outcome: Patient not reachable after several attempts, routed to Pharmacist Team/Provider as an FYI    Use cora block for the carrier/Plan on the flowsheet      MTM Practitioner please send patient letter    Arlene Germain MT   [phone]

## 2025-07-19 DIAGNOSIS — M19.90 INFLAMMATORY ARTHRITIS: ICD-10-CM

## 2025-07-21 RX ORDER — PNV NO.95/FERROUS FUM/FOLIC AC 28MG-0.8MG
250 TABLET ORAL 2 TIMES DAILY
Qty: 180 TABLET | Refills: 1 | Status: SHIPPED | OUTPATIENT
Start: 2025-07-21

## 2025-07-28 ENCOUNTER — TELEPHONE (OUTPATIENT)
Dept: GASTROENTEROLOGY | Facility: CLINIC | Age: 60
End: 2025-07-28
Payer: COMMERCIAL

## 2025-07-28 NOTE — TELEPHONE ENCOUNTER
pt stated she will be meeting with her primary this week then callback to schedule after that appointment

## 2025-07-31 ENCOUNTER — OFFICE VISIT (OUTPATIENT)
Dept: FAMILY MEDICINE | Facility: CLINIC | Age: 60
End: 2025-07-31
Payer: COMMERCIAL

## 2025-07-31 ENCOUNTER — LAB (OUTPATIENT)
Dept: LAB | Facility: CLINIC | Age: 60
End: 2025-07-31
Payer: COMMERCIAL

## 2025-07-31 VITALS
OXYGEN SATURATION: 100 % | SYSTOLIC BLOOD PRESSURE: 117 MMHG | DIASTOLIC BLOOD PRESSURE: 77 MMHG | BODY MASS INDEX: 29 KG/M2 | HEIGHT: 63 IN | WEIGHT: 163.7 LBS | HEART RATE: 67 BPM | RESPIRATION RATE: 16 BRPM

## 2025-07-31 DIAGNOSIS — G89.29 CHRONIC PAIN OF RIGHT KNEE: ICD-10-CM

## 2025-07-31 DIAGNOSIS — J30.9 ALLERGIC RHINITIS, UNSPECIFIED SEASONALITY, UNSPECIFIED TRIGGER: ICD-10-CM

## 2025-07-31 DIAGNOSIS — M35.9 UNDIFFERENTIATED CONNECTIVE TISSUE DISEASE: ICD-10-CM

## 2025-07-31 DIAGNOSIS — M25.561 CHRONIC PAIN OF RIGHT KNEE: ICD-10-CM

## 2025-07-31 DIAGNOSIS — E11.9 TYPE 2 DIABETES, HBA1C GOAL < 7% (H): Primary | ICD-10-CM

## 2025-07-31 DIAGNOSIS — J45.40 MODERATE PERSISTENT ASTHMA WITHOUT COMPLICATION: ICD-10-CM

## 2025-07-31 DIAGNOSIS — E11.9 TYPE 2 DIABETES, HBA1C GOAL < 7% (H): ICD-10-CM

## 2025-07-31 LAB
EST. AVERAGE GLUCOSE BLD GHB EST-MCNC: 186 MG/DL
HBA1C MFR BLD: 8.1 %
TSH SERPL DL<=0.005 MIU/L-ACNC: 0.52 UIU/ML (ref 0.3–4.2)

## 2025-07-31 PROCEDURE — 99000 SPECIMEN HANDLING OFFICE-LAB: CPT | Performed by: PATHOLOGY

## 2025-07-31 PROCEDURE — 83036 HEMOGLOBIN GLYCOSYLATED A1C: CPT | Performed by: FAMILY MEDICINE

## 2025-07-31 RX ORDER — ACETAMINOPHEN AND CODEINE PHOSPHATE 300; 30 MG/1; MG/1
1 TABLET ORAL EVERY 6 HOURS PRN
Qty: 10 TABLET | Refills: 0 | Status: SHIPPED | OUTPATIENT
Start: 2025-07-31 | End: 2025-08-03

## 2025-07-31 RX ORDER — MONTELUKAST SODIUM 10 MG/1
10 TABLET ORAL AT BEDTIME
Qty: 90 TABLET | Refills: 3 | Status: SHIPPED | OUTPATIENT
Start: 2025-07-31

## 2025-07-31 RX ORDER — FEXOFENADINE HCL 180 MG/1
180 TABLET ORAL DAILY
Qty: 90 TABLET | Refills: 3 | Status: SHIPPED | OUTPATIENT
Start: 2025-07-31

## 2025-07-31 NOTE — PROGRESS NOTES
Assessment & Plan     Type 2 diabetes, HbA1c goal < 7% (H)  Due; see lab result message  - Hemoglobin A1c; Future  - TSH with free T4 reflex; Future    Chronic pain of right knee  See HPI  - acetaminophen-codeine (TYLENOL #3) 300-30 MG per tablet; Take 1 tablet by mouth every 6 hours as needed for severe pain.  - Orthopedic  Referral; Future    Undifferentiated connective tissue disease  Cont w/ Rheum   - acetaminophen-codeine (TYLENOL #3) 300-30 MG per tablet; Take 1 tablet by mouth every 6 hours as needed for severe pain.    Moderate persistent asthma without complication  Resume  - montelukast (SINGULAIR) 10 MG tablet; Take 1 tablet (10 mg) by mouth at bedtime.    Allergic rhinitis, unspecified seasonality, unspecified trigger  Same  - fexofenadine (ALLEGRA) 180 MG tablet; Take 1 tablet (180 mg) by mouth daily.  - montelukast (SINGULAIR) 10 MG tablet; Take 1 tablet (10 mg) by mouth at bedtime.      34 minutes spent by me on the date of the encounter doing chart review, history and exam, documentation and further activities per the noteThe longitudinal plan of care for the diagnosis(es)/condition(s) as documented were addressed during this visit. Due to the added complexity in care, I will continue to support Janine in the subsequent management and with ongoing continuity of care.    Subjective   Janine is a 60 year old, presenting for the following health issues:  Follow Up and Refill Request        7/31/2025    11:59 AM   Additional Questions   Roomed by micha GILLILAND      Allergies and asthma: in past much better w/ allegra and singulair recently has been out, possible insurance coverage issue, will try to refill and see; tolerates both well  Pain: abdomen and joints; in particular R knee  Rvwd 2024 R knee MR, she is open to Ortho follow-up and possible injection to see if helps, we discussed some leg pain could be radicular as well  She finds ultram not very helpful for pain plus it causes fatigue; rvwd  . Cannot use nsaids. Working w/ Rheum on autoimmune disease. Will try t3 and let me know if helpful and tolerated or not.  She is ok w/ GI follow-up as planned  Past Medical History:   Diagnosis Date    Abnormal glandular Papanicolaou smear of cervix 1992    Allergic rhinitis     Allergy, airborne subst    Anxiety     Arthritis     ASCVD (arteriosclerotic cardiovascular disease)     Chronic pain     Chronic pancreatitis (H)     idiopathic, Tx: PPI, antioxidants, pancreatic enzymes    Common migraine     Coronary artery disease     Costochondritis     Diabetes mellitus, type 2 (H)     Difficulty in walking(719.7)     Dyspnea on exertion     Ectasia, mammary duct     [facility], persistent nipple discharge    Elevated fasting glucose     Gastro-oesophageal reflux disease     Granulomatosis with polyangiitis (H)     Hernia     History of angina     Hyperlipidemia     Hyperlipidemia LDL goal < 100     Hypertension goal BP (blood pressure) < 140/90     Essential hypertension    Inflammatory bowel disease     Iron deficiency anemia     Ischemic cardiomyopathy     Menorrhagia     Migraine headaches     Migraines     Mild persistent asthma     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/u    NSTEMI (non-ST elevated myocardial infarction) (H) 11/01/2011    Numbness and tingling     Numbness of feet     Obesity     PCOS (polycystic ovarian syndrome)     PCOS    Peripheral vascular disease     PONV (postoperative nausea and vomiting)     RA (rheumatoid arthritis) (H)     S/P coronary artery stent placement 11/01/2011    LAD x2; D1 x 1; RCA x1    Seasonal affective disorder     Shortness of breath     Stented coronary artery     4 STENTS- 11/1/11    Type 2 diabetes, HbA1c goal < 7% (H) 06/2010    Unspecified cerebral artery occlusion with cerebral infarction     Uterine leiomyoma     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 Matias, neg w/u, Rx'd  w/prednisone    Ventral hernia, unspecified, without mention of obstruction or gangrene 2012     Past Surgical History:   Procedure Laterality Date    ABDOMEN SURGERY      BIOPSY      C/SECTION, LOW TRANSVERSE  1996        CARDIAC SURGERY      cardiac stent- recent in 16; 4 other stents    COLONOSCOPY N/A 2023    Procedure: COLONOSCOPY, WITH POLYPECTOMY;  Surgeon: Percy Gross MD;  Location: [facility] OR    DILATION AND CURETTAGE N/A 2016    Procedure: DILATION AND CURETTAGE;  Surgeon: Nahed Butler MD;  [facility]    ENDOMETRIAL SAMPLING (BIOPSY) N/A 2023    Procedure: ENDOMETRIAL BIOPSY;  Surgeon: Nahed Butler MD;  [facility]    ENDOSCOPIC ULTRASOUND UPPER GASTROINTESTINAL TRACT (GI) N/A 2025    Procedure: Endoscopic ultrasound upper gastrointestinal tract (GI);  Surgeon: Bryon Earl MD;  [facility]: [facility]    ESOPHAGOSCOPY, GASTROSCOPY, DUODENOSCOPY (EGD), COMBINED N/A 2022    Procedure: ESOPHAGOGASTRODUODENOSCOPY, WITH BIOPSY;  Surgeon: Enzo Sesay MD;  [facility]: [facility]    ESOPHAGOSCOPY, GASTROSCOPY, DUODENOSCOPY (EGD), COMBINED N/A 2023    Procedure: ESOPHAGOGASTRODUODENOSCOPY, WITH BIOPSY;  Surgeon: Percy Gross MD;  Location: [facility] OR    EXAM UNDER ANESTHESIA PELVIC N/A 2023    Procedure: EXAM UNDER ANESTHESIA;  Surgeon: Nahed Butler MD;  [facility]    HC UGI ENDOSCOPY W EUS  2008    Dr. Pastrana, possible chronic pancreatitis, fatty liver    HERNIA REPAIR  2012    done at [facility]    INSERT INTRAUTERINE DEVICE N/A 2016    Procedure: INSERT INTRAUTERINE DEVICE;  Surgeon: Nahed Butler MD;  [facility]    INT UTERINE FIBRIOD EMBOLIZATION  10/29/2014    LAPAROSCOPIC CHOLECYSTECTOMY  2008    Dr. Arnol GRUBBS TX, CERVICAL  1992    s/p LEEP, done at [facility] in [place].    ORBITOTOMY Right 03/15/2016    Procedure: ORBITOTOMY;   Surgeon: Myron Cyr MD;  Location: [facility]    ORBITOTOMY Right 08/04/2017    Procedure: ORBITOTOMY;  RIGHT ORBITOTOMY AND BIOPSY;  Surgeon: Charis Holbrook MD;  Location: [facility]    REMOVE INTRAUTERINE DEVICE N/A 04/28/2023    Procedure: Remove intrauterine device,;  Surgeon: Nahed Butler MD;  Location:  OR    REPAIR PTOSIS Right 11/17/2017    Procedure: REPAIR PTOSIS;  RIGHT UPPER LID PTOSIS REPAIR;  Surgeon: Myron Cyr MD;  Location: [facility]    UPPER GI ENDOSCOPY  10/21/2008    mild gastritis, Dr. Rocky [facility] HEART XTHORACIC,COMPLETE, W/O DOPPLER  02/04/2004    [facility] WNL, EF 60%     Current Outpatient Medications   Medication Sig Dispense Refill    acetaminophen (TYLENOL) 325 MG tablet Take 1-2 tablets (325-650 mg) by mouth every 6 hours as needed for pain (headache) 250 tablet 4    acetaminophen-codeine (TYLENOL #3) 300-30 MG per tablet Take 1 tablet by mouth every 6 hours as needed for severe pain. 10 tablet 0    ADVAIR DISKUS 250-50 MCG/ACT inhaler Inhale 1 puff into the lungs 2 times daily      albuterol (2.5 MG/3ML) 0.083% neb solution INL 1 VIAL VIA NEBULIZATION Q 4 TO 6 HOURS PRN  1    albuterol (PROAIR HFA, PROVENTIL HFA, VENTOLIN HFA) 108 (90 BASE) MCG/ACT inhaler Inhale 2 puffs into the lungs every 6 hours as needed for shortness of breath / dyspnea or wheezing 3 Inhaler 1    BANOPHEN 25 MG tablet Take 25 mg by mouth At Bedtime      BD PEN NEEDLE MARCK 2ND GEN 32G X 4 MM miscellaneous USE ONE PEN NEEDLE DAILY OR AS DIRECTED 100 each 2    blood glucose (NO BRAND SPECIFIED) lancets standard Use to test blood sugar 1-4 times daily or as directed. 400 each 3    blood glucose (NO BRAND SPECIFIED) test strip Use to test blood sugar 1-4 times daily or as directed 400 strip 3    blood glucose monitoring (NO BRAND SPECIFIED) meter device kit Use to test blood sugar 1-4 times daily or as directed. 1 kit 0    Blood Pressure Monitor KIT 1 each daily Monitor home blood  pressure as instructed by physician.  Dispense [facility] blood pressure kit. 1 kit 0    calcium carbonate (TUMS) 500 MG chewable tablet Take 3-4 tablets (1,500-2,000 mg) by mouth daily as needed 90 tablet 3    clopidogrel (PLAVIX) 75 MG tablet TAKE 1 TABLET BY MOUTH EVERY DAY 30 tablet 8    clotrimazole (MYCELEX) 10 MG lozenge Place 1 lozenge (10 mg) inside cheek 5 times daily 50 lozenge 1    COMPOUNDED NON-CONTROLLED SUBSTANCE (CMPD RX) - PHARMACY TO MIX COMPOUNDED MEDICATION Ketroprofen 5% cream compound 2- 4 times a day as needed over knee for knee pain 3 each 3    cyanocobalamin (VITAMIN B-12) 1000 MCG tablet Take 1 tablet by mouth daily at 2 pm      cyclobenzaprine (FLEXERIL) 10 MG tablet Take 1 tablet (10 mg) by mouth 2 times daily as needed for muscle spasms 60 tablet 3    cycloSPORINE (RESTASIS) 0.05 % ophthalmic emulsion 1 month supply 11 refills 1 each 11    dicyclomine (BENTYL) 20 MG tablet TAKE 1 TABLET(20 MG) BY MOUTH FOUR TIMES DAILY AS NEEDED. 60 tablet 4    EPIPEN 2-RIKY 0.3 MG/0.3ML injection INJECT 0.3 MG INTO THE MUSCLE PRF ANAPHYALAXIS  0    esomeprazole (NEXIUM) 40 MG DR capsule Take 1 capsule (40 mg) by mouth every morning (before breakfast). Take 30-60 minutes before eating. 90 capsule 3    esomeprazole (NEXIUM) 40 MG DR capsule Take 1 capsule (40 mg) by mouth 2 times daily Take 30-60 minutes before eating. 180 capsule 0    fexofenadine (ALLEGRA) 180 MG tablet Take 1 tablet (180 mg) by mouth daily. 90 tablet 3    fluticasone (FLONASE) 50 MCG/ACT nasal spray Spray 1 spray in nostril as needed      folic acid (FOLVITE) 1 MG tablet TAKE 1 TABLET BY MOUTH EVERY DAY 90 tablet 0    insulin glargine (LANTUS PEN) 100 UNIT/ML pen Inject 56 Units subcutaneously every morning. please call [phone] to schedule one year follow-up 75 mL 0    JARDIANCE 25 MG TABS tablet TAKE 1 TABLET (25 MG) BY MOUTH DAILY. FOLLOW UP VISIT NEEDED. CALL  TO SCHEDULE. 30 tablet 2    lisinopril-hydrochlorothiazide  (ZESTORETIC) 20-25 MG tablet Take 1 tablet by mouth daily. 90 tablet 2    Magnesium Oxide 250 MG tablet TAKE 1 TABLET BY MOUTH 2 TIMES DAILY 180 tablet 1    metFORMIN (GLUCOPHAGE XR) 500 MG 24 hr tablet TAKE 4 TABLETS BY MOUTH DAILY WITH DINNER 360 tablet 1    metoprolol succinate ER (TOPROL XL) 100 MG 24 hr tablet TAKE 1 TABLET BY MOUTH EVERY DAY 30 tablet 2    montelukast (SINGULAIR) 10 MG tablet Take 1 tablet (10 mg) by mouth at bedtime. 90 tablet 3    Multiple Vitamin (DAILY-GERALD MULTIVITAMIN) TABS TAKE 1 TABLET BY MOUTH EVERY  tablet 3    nitroGLYcerin (NITROSTAT) 0.4 MG sublingual tablet FOR CHEST PAIN PLACE 1 TABLET UNDER THE TONGUE EVERY 5 MINUTES FOR 3 DOSES. IF SYMPTOMS PERSIST 5 MINUTES AFTER 1ST DOSE CALL 911. 25 tablet 11    olopatadine (PATANOL) 0.1 % ophthalmic solution Place 1 drop into both eyes as needed      ondansetron (ZOFRAN ODT) 8 MG ODT tab TAKE 1 TABLET BY MOUTH EVERY 8 HOURS AS NEEDED FOR NAUSEA 20 tablet 1    pravastatin (PRAVACHOL) 40 MG tablet TAKE 1 TABLET BY MOUTH EVERY DAY 30 tablet 8    pregabalin (LYRICA) 25 MG capsule Take 2 capsules (50 mg) by mouth daily. 180 capsule 1    sennosides (SENOKOT) 8.6 MG tablet 1-2 tabs a day as needed for constipation 60 tablet 1    spironolactone (ALDACTONE) 25 MG tablet Take 1 tablet (25 mg) by mouth daily. May also take 0.5 tablets (12.5 mg) daily as needed (for weight gain). 135 tablet 2    sucralfate (CARAFATE) 1 GM tablet Take 1 tablet (1 g) by mouth 4 times daily 120 tablet 3    traMADol (ULTRAM) 50 MG tablet Take 1 tablet (50 mg) by mouth every 6 hours as needed for severe pain. TAKE 1 TABLET(50 MG) BY MOUTH EVERY 8 HOURS AS NEEDED FOR SEVERE PAIN 25 tablet 0    YUVAFEM 10 MCG TABS vaginal tablet PLACE 1 TABLET (10 MCG) VAGINALLY TWICE A WEEK 24 tablet 1     Current Facility-Administered Medications   Medication Dose Route Frequency Provider Last Rate Last Admin    lidocaine (viscous) (XYLOCAINE) 2 % 15 mL, alum & mag  hydroxide-simethicone (MAALOX) 15 mL GI Cocktail  30 mL Oral Once          Allergies   Allergen Reactions    Amoxicillin-Pot Clavulanate      Unknown possible hives and edema    Amoxicillin-Pot Clavulanate     Artificial Sweetner (Informational Only)  Other (See Comments)     Increased headache    Azithromycin     Clavulanic Acid     Diatrizoate Other (See Comments)     Pt wants this listed because she is allergic to shellfish     Imitrex [Triptans]      Severe face/neck/chest tightness and flushing side effects     Penicillins Hives     Unknown     Pork Allergy      Stomach pain, cramping, diarrhea, itching, nausea and headaches    Shellfish Allergy Hives and Swelling    Shellfish-Derived Products     Sulfa Antibiotics Hives and Swelling    Sulfatolamide     Zithromax [Azithromycin Dihydrate] Swelling     Unknown      Family History   Problem Relation Age of Onset    Hypertension Mother     Arthritis Mother     Heart Disease Mother         long qt syndrome    Thyroid Disease Mother     C.A.D. Mother     Unknown/Adopted Mother     Heart Disease Father         heart attack    Hypertension Father     Depression Father     C.A.D. Father     Neurologic Disorder Sister         migraines    Neurologic Disorder Sister         migraines    Heart Disease Brother 15        MI at 15, 16.     Arthritis Brother         he passed away, had severe arthritis at age 11    C.A.D. Brother     Respiratory Son         asthma    Hypertension Maternal Aunt     Hypertension Maternal Uncle     Arthritis Maternal Uncle     Eye Disorder Maternal Uncle         cataracts    Family History Negative Other         neg for RA, SLE    Coronary Artery Disease Brother     Heart Failure Brother     Arthritis Brother     Heart Disease Brother     Coronary Artery Disease Brother     Early Death Brother     Ovarian Cancer Cousin     Skin Cancer No family hx of         No known family hx of skin cancer    Deep Vein Thrombosis (DVT) No family hx of      Dementia No family hx of     Asthma No family hx of     Diabetes No family hx of     Cerebrovascular Disease No family hx of     Anesthesia Reaction No family hx of      Social History     Socioeconomic History    Marital status: Single     Spouse name: Not on file    Number of children: 1    Years of education: Not on file    Highest education level: Not on file   Occupational History     Employer: NONE    Tobacco Use    Smoking status: Some Days     Current packs/day: 0.00     Average packs/day: 0.2 packs/day for 1 year (0.2 ttl pk-yrs)     Types: Cigarettes     Start date: 2015     Last attempt to quit: 2016     Years since quittin.5    Smokeless tobacco: Never   Vaping Use    Vaping status: Every Day    Substances: Nicotine   Substance and Sexual Activity    Alcohol use: No     Alcohol/week: 0.0 standard drinks of alcohol    Drug use: No    Sexual activity: Not Currently   Other Topics Concern    Parent/sibling w/ CABG, MI or angioplasty before 65F 55M? Yes   Social History Narrative    Balanced Diet - sometimes    Osteoporosis Prevention Measures - Dairy servings per day: 2 servings weekly    Regular Exercise -  Yes Describe walking 4 times a week    Dental Exam - NO    Seatbelts used - Yes    Self Breast Exam - Yes    Abuse: Current or Past (Physical, Sexual or Emotional)- No    Do you have any concerns about STD's -  No    Do you feel safe in your environment - No    Guns stored in the home - No    Sunscreen used - Yes    Lipids -  YES - Date:     Glucose -  YES - Date:     Eye Exam - YES - Date: one year ago    Colon Cancer Screening - No    Hemoccults - NO    Pap Test -  YES - Date: , remote history of LEEP    Mammography - YES - Date: last spring, would like to discuss, needs a referral to [facility]    DEXA - NO    Immunizations reviewed and up to date - Yes, last td given in  or      Social Drivers of Health     Financial Resource Strain: Low Risk   "(2/9/2024)    Financial Resource Strain     Within the past 12 months, have you or your family members you live with been unable to get utilities (heat, electricity) when it was really needed?: No   Food Insecurity: High Risk (2/9/2024)    Food Insecurity     Within the past 12 months, did you worry that your food would run out before you got money to buy more?: Yes     Within the past 12 months, did the food you bought just not last and you didn t have money to get more?: No   Transportation Needs: Low Risk  (2/9/2024)    Transportation Needs     Within the past 12 months, has lack of transportation kept you from medical appointments, getting your medicines, non-medical meetings or appointments, work, or from getting things that you need?: No   Physical Activity: Not on file   Stress: Not on file   Social Connections: Not on file   Interpersonal Safety: Unknown (6/19/2025)    Interpersonal Safety     Do you feel physically and emotionally safe where you currently live?: Patient unable to answer     Within the past 12 months, have you been hit, slapped, kicked or otherwise physically hurt by someone?: Patient unable to answer     Within the past 12 months, have you been humiliated or emotionally abused in other ways by your partner or ex-partner?: Patient unable to answer   Housing Stability: Low Risk  (2/9/2024)    Housing Stability     Do you have housing? : Yes     Are you worried about losing your housing?: No         Objective    /77 (BP Location: Right arm, Patient Position: Sitting, Cuff Size: Adult Large)   Pulse 67   Resp 16   Ht 1.588 m (5' 2.52\")   Wt 74.3 kg (163 lb 11.2 oz)   LMP  (LMP Unknown)   SpO2 100%   BMI 29.45 kg/m    Body mass index is 29.45 kg/m .  Physical Exam   GENERAL: alert and no distress    MS: no gross musculoskeletal defects noted, no edema            Signed Electronically by: Kash Solano MD    "

## 2025-08-04 ENCOUNTER — PATIENT OUTREACH (OUTPATIENT)
Dept: CARE COORDINATION | Facility: CLINIC | Age: 60
End: 2025-08-04
Payer: COMMERCIAL

## 2025-08-07 ENCOUNTER — TELEPHONE (OUTPATIENT)
Dept: ORTHOPEDICS | Facility: CLINIC | Age: 60
End: 2025-08-07

## 2025-08-11 ENCOUNTER — PATIENT OUTREACH (OUTPATIENT)
Dept: CARE COORDINATION | Facility: CLINIC | Age: 60
End: 2025-08-11

## 2025-08-12 DIAGNOSIS — K26.9 DUODENAL ULCER WITHOUT HEMORRHAGE OR PERFORATION AND WITHOUT OBSTRUCTION: ICD-10-CM

## 2025-08-12 DIAGNOSIS — G62.9 NEUROPATHY: ICD-10-CM

## 2025-08-12 DIAGNOSIS — K58.8 OTHER IRRITABLE BOWEL SYNDROME: ICD-10-CM

## 2025-08-12 RX ORDER — PREGABALIN 25 MG/1
50 CAPSULE ORAL DAILY
Qty: 180 CAPSULE | Refills: 1 | OUTPATIENT
Start: 2025-08-12

## 2025-08-13 ENCOUNTER — PATIENT OUTREACH (OUTPATIENT)
Dept: CARE COORDINATION | Facility: CLINIC | Age: 60
End: 2025-08-13

## 2025-08-13 RX ORDER — DICYCLOMINE HCL 20 MG
TABLET ORAL
Qty: 60 TABLET | Refills: 0 | Status: SHIPPED | OUTPATIENT
Start: 2025-08-13

## 2025-08-13 RX ORDER — SUCRALFATE 1 G/1
1 TABLET ORAL 4 TIMES DAILY
Qty: 120 TABLET | Refills: 0 | Status: SHIPPED | OUTPATIENT
Start: 2025-08-13

## 2025-08-13 RX ORDER — PREGABALIN 25 MG/1
50 CAPSULE ORAL DAILY
Qty: 180 CAPSULE | Refills: 1 | Status: SHIPPED | OUTPATIENT
Start: 2025-08-13

## 2025-08-14 DIAGNOSIS — M54.50 LOW BACK PAIN: ICD-10-CM

## 2025-08-14 DIAGNOSIS — M54.17 LUMBOSACRAL RADICULOPATHY: Primary | ICD-10-CM

## 2025-08-18 ENCOUNTER — TELEPHONE (OUTPATIENT)
Dept: GASTROENTEROLOGY | Facility: CLINIC | Age: 60
End: 2025-08-18
Payer: COMMERCIAL

## 2025-08-19 ENCOUNTER — OFFICE VISIT (OUTPATIENT)
Dept: ORTHOPEDICS | Facility: CLINIC | Age: 60
End: 2025-08-19
Attending: FAMILY MEDICINE
Payer: COMMERCIAL

## 2025-08-19 ENCOUNTER — ANCILLARY PROCEDURE (OUTPATIENT)
Dept: GENERAL RADIOLOGY | Facility: CLINIC | Age: 60
End: 2025-08-19
Attending: ORTHOPAEDIC SURGERY
Payer: COMMERCIAL

## 2025-08-19 ENCOUNTER — PRE VISIT (OUTPATIENT)
Dept: ORTHOPEDICS | Facility: CLINIC | Age: 60
End: 2025-08-19

## 2025-08-19 ENCOUNTER — OFFICE VISIT (OUTPATIENT)
Dept: ORTHOPEDICS | Facility: CLINIC | Age: 60
End: 2025-08-19
Payer: COMMERCIAL

## 2025-08-19 VITALS — HEIGHT: 63 IN | BODY MASS INDEX: 28 KG/M2 | WEIGHT: 158 LBS

## 2025-08-19 DIAGNOSIS — M48.062 LUMBAR STENOSIS WITH NEUROGENIC CLAUDICATION: Primary | ICD-10-CM

## 2025-08-19 DIAGNOSIS — M17.11 PRIMARY OSTEOARTHRITIS OF ONE KNEE, RIGHT: ICD-10-CM

## 2025-08-19 DIAGNOSIS — M54.17 LUMBOSACRAL RADICULOPATHY: ICD-10-CM

## 2025-08-19 DIAGNOSIS — M47.12 CERVICAL SPONDYLOSIS WITH MYELOPATHY AND RADICULOPATHY: Primary | ICD-10-CM

## 2025-08-19 DIAGNOSIS — M54.50 LOW BACK PAIN: ICD-10-CM

## 2025-08-19 DIAGNOSIS — M47.22 CERVICAL SPONDYLOSIS WITH MYELOPATHY AND RADICULOPATHY: Primary | ICD-10-CM

## 2025-08-19 DIAGNOSIS — M48.02 CERVICAL STENOSIS OF SPINE: ICD-10-CM

## 2025-08-19 DIAGNOSIS — R26.89 DECREASED MOBILITY: ICD-10-CM

## 2025-08-19 PROCEDURE — 20610 DRAIN/INJ JOINT/BURSA W/O US: CPT | Mod: RT | Performed by: STUDENT IN AN ORGANIZED HEALTH CARE EDUCATION/TRAINING PROGRAM

## 2025-08-19 PROCEDURE — 72082 X-RAY EXAM ENTIRE SPI 2/3 VW: CPT | Performed by: HEALTH CARE PROVIDER

## 2025-08-19 PROCEDURE — 99213 OFFICE O/P EST LOW 20 MIN: CPT | Mod: 25 | Performed by: STUDENT IN AN ORGANIZED HEALTH CARE EDUCATION/TRAINING PROGRAM

## 2025-08-19 PROCEDURE — 77073 BONE LENGTH STUDIES: CPT | Performed by: HEALTH CARE PROVIDER

## 2025-08-19 RX ORDER — TRIAMCINOLONE ACETONIDE 40 MG/ML
40 INJECTION, SUSPENSION INTRA-ARTICULAR; INTRAMUSCULAR
Status: COMPLETED | OUTPATIENT
Start: 2025-08-19 | End: 2025-08-19

## 2025-08-19 RX ORDER — LIDOCAINE HYDROCHLORIDE 10 MG/ML
4 INJECTION, SOLUTION EPIDURAL; INFILTRATION; INTRACAUDAL; PERINEURAL
Status: COMPLETED | OUTPATIENT
Start: 2025-08-19 | End: 2025-08-19

## 2025-08-19 RX ADMIN — LIDOCAINE HYDROCHLORIDE 4 ML: 10 INJECTION, SOLUTION EPIDURAL; INFILTRATION; INTRACAUDAL; PERINEURAL at 13:49

## 2025-08-19 RX ADMIN — TRIAMCINOLONE ACETONIDE 40 MG: 40 INJECTION, SUSPENSION INTRA-ARTICULAR; INTRAMUSCULAR at 13:49

## 2025-08-20 ENCOUNTER — TELEPHONE (OUTPATIENT)
Dept: NEUROSURGERY | Facility: CLINIC | Age: 60
End: 2025-08-20
Payer: COMMERCIAL

## 2025-08-27 ENCOUNTER — TELEPHONE (OUTPATIENT)
Dept: GASTROENTEROLOGY | Facility: CLINIC | Age: 60
End: 2025-08-27
Payer: COMMERCIAL

## (undated) DEVICE — GLOVE EXAM NITRILE LG PF LATEX FREE 5064

## (undated) DEVICE — ESU PENCIL W/SMOKE EVAC E2515HS

## (undated) DEVICE — SPECIMEN CONTAINER 3OZ W/FORMALIN 59901

## (undated) DEVICE — GLOVE PROTEXIS W/NEU-THERA 7.5  2D73TE75

## (undated) DEVICE — ESU ELEC NDL 1" COATED/INSULATED E1465

## (undated) DEVICE — GLOVE PROTEXIS MICRO 7.0  2D73PM70

## (undated) DEVICE — SNARE CAPIVATOR ROUND COLD SNR BX10 M00561101

## (undated) DEVICE — SOL WATER IRRIG 1000ML BOTTLE 2F7114

## (undated) DEVICE — GOWN IMPERVIOUS 2XL BLUE

## (undated) DEVICE — PREP DYNA-HEX 4% CHG SCRUB 4OZ BOTTLE MDS098710

## (undated) DEVICE — SUCTION CANISTER MEDIVAC LINER 1500ML W/LID 65651-515

## (undated) DEVICE — DECANTER TRANSFER DEVICE 2008S

## (undated) DEVICE — GOWN XLG DISP 9545

## (undated) DEVICE — SOL WATER IRRIG 500ML BOTTLE 2F7113

## (undated) DEVICE — LINEN GOWN X4 5410

## (undated) DEVICE — SU PLAIN 6-0 G-1DA 18" 770G

## (undated) DEVICE — BUR WIREPASS 1X19MM 5091-247

## (undated) DEVICE — LINEN TOWEL PACK X5 5464

## (undated) DEVICE — SUCTION MANIFOLD NEPTUNE 2 SYS 1 PORT 702-025-000

## (undated) DEVICE — SYR 01ML 27GA 0.5" NDL TBC 309623

## (undated) DEVICE — GLOVE BIOGEL PI MICRO INDICATOR UNDERGLOVE SZ 7.5 48975

## (undated) DEVICE — ESU ELEC NDL 1" E1552

## (undated) DEVICE — PEN MARKING SKIN FINE 31145942

## (undated) DEVICE — NDL ANGIOCATH 20GA 1.25" 4056

## (undated) DEVICE — SU MERSILENE 5-0 S-24 18" 1764G

## (undated) DEVICE — ENDO FORCEP SPIKED SERRATED SHAFT JUMBO 239CM G56998

## (undated) DEVICE — ENDO BITE BLOCK ADULT OMNI-BLOC

## (undated) DEVICE — SYR 30ML SLIP TIP W/O NDL 302833

## (undated) DEVICE — SUCTION CANISTER MEDIVAC LINER 3000ML W/LID 65651-530

## (undated) DEVICE — ESU PENCIL W/HOLSTER E2350H

## (undated) DEVICE — TUBING SUCTION 12"X1/4" N612

## (undated) DEVICE — SOL NACL 0.9% IRRIG 1000ML BOTTLE 07138-09

## (undated) DEVICE — Device

## (undated) DEVICE — GLOVE BIOGEL PI MICRO SZ 7.0 48570

## (undated) DEVICE — PACK OCULOPLATIC SEN15OCFSD

## (undated) DEVICE — SU PLAIN FAST ABSORB 6-0 PC-1 18" 1916G

## (undated) DEVICE — SPONGE COTTONOID 1/2X3" 80-1407

## (undated) DEVICE — SOL NACL 0.9% IRRIG 1000ML BOTTLE 2F7124

## (undated) DEVICE — STRAP KNEE/BODY 31143004

## (undated) DEVICE — TUBING SUCTION MEDI-VAC 1/4"X20' N620A

## (undated) DEVICE — SUCTION CATH AIRLIFE TRI-FLO W/CONTROL PORT 14FR  T60C

## (undated) DEVICE — GLOVE PROTEXIS W/NEU-THERA 8.5  2D73TE85

## (undated) DEVICE — KIT ENDO TURNOVER/PROCEDURE CARRY-ON 101822

## (undated) DEVICE — PAD CHUX UNDERPAD 30X36" P3036C

## (undated) DEVICE — ENDO FORCEP BX CAPTURA PRO SPIKE G50696

## (undated) RX ORDER — TRIAMCINOLONE ACETONIDE 40 MG/ML
INJECTION, SUSPENSION INTRA-ARTICULAR; INTRAMUSCULAR
Status: DISPENSED
Start: 2025-08-19

## (undated) RX ORDER — ACETAMINOPHEN 325 MG/1
TABLET ORAL
Status: DISPENSED
Start: 2024-09-26

## (undated) RX ORDER — DEXAMETHASONE SODIUM PHOSPHATE 4 MG/ML
INJECTION, SOLUTION INTRA-ARTICULAR; INTRALESIONAL; INTRAMUSCULAR; INTRAVENOUS; SOFT TISSUE
Status: DISPENSED
Start: 2023-04-28

## (undated) RX ORDER — METHYLPREDNISOLONE SODIUM SUCCINATE 125 MG/2ML
INJECTION, POWDER, LYOPHILIZED, FOR SOLUTION INTRAMUSCULAR; INTRAVENOUS
Status: DISPENSED
Start: 2019-12-19

## (undated) RX ORDER — ONDANSETRON 2 MG/ML
INJECTION INTRAMUSCULAR; INTRAVENOUS
Status: DISPENSED
Start: 2023-04-28

## (undated) RX ORDER — ACETAMINOPHEN 325 MG/1
TABLET ORAL
Status: DISPENSED
Start: 2022-04-06

## (undated) RX ORDER — METHYLPREDNISOLONE SODIUM SUCCINATE 125 MG/2ML
INJECTION, POWDER, LYOPHILIZED, FOR SOLUTION INTRAMUSCULAR; INTRAVENOUS
Status: DISPENSED
Start: 2023-09-19

## (undated) RX ORDER — METHYLPREDNISOLONE SODIUM SUCCINATE 125 MG/2ML
INJECTION, POWDER, LYOPHILIZED, FOR SOLUTION INTRAMUSCULAR; INTRAVENOUS
Status: DISPENSED
Start: 2021-10-18

## (undated) RX ORDER — METHYLPREDNISOLONE SODIUM SUCCINATE 125 MG/2ML
INJECTION, POWDER, LYOPHILIZED, FOR SOLUTION INTRAMUSCULAR; INTRAVENOUS
Status: DISPENSED
Start: 2019-12-05

## (undated) RX ORDER — METHYLPREDNISOLONE SODIUM SUCCINATE 125 MG/2ML
INJECTION, POWDER, LYOPHILIZED, FOR SOLUTION INTRAMUSCULAR; INTRAVENOUS
Status: DISPENSED
Start: 2018-12-26

## (undated) RX ORDER — METHYLPREDNISOLONE SODIUM SUCCINATE 125 MG/2ML
INJECTION, POWDER, LYOPHILIZED, FOR SOLUTION INTRAMUSCULAR; INTRAVENOUS
Status: DISPENSED
Start: 2023-03-28

## (undated) RX ORDER — METHYLPREDNISOLONE SODIUM SUCCINATE 125 MG/2ML
INJECTION, POWDER, LYOPHILIZED, FOR SOLUTION INTRAMUSCULAR; INTRAVENOUS
Status: DISPENSED
Start: 2023-10-03

## (undated) RX ORDER — CLINDAMYCIN PHOSPHATE 600 MG/50ML
INJECTION, SOLUTION INTRAVENOUS
Status: DISPENSED
Start: 2017-08-04

## (undated) RX ORDER — LIDOCAINE HYDROCHLORIDE 20 MG/ML
SOLUTION OROPHARYNGEAL
Status: DISPENSED
Start: 2022-02-08

## (undated) RX ORDER — LIDOCAINE HYDROCHLORIDE 10 MG/ML
INJECTION, SOLUTION EPIDURAL; INFILTRATION; INTRACAUDAL; PERINEURAL
Status: DISPENSED
Start: 2023-04-28

## (undated) RX ORDER — ALBUTEROL SULFATE 0.83 MG/ML
SOLUTION RESPIRATORY (INHALATION)
Status: DISPENSED
Start: 2025-06-19

## (undated) RX ORDER — ONDANSETRON 2 MG/ML
INJECTION INTRAMUSCULAR; INTRAVENOUS
Status: DISPENSED
Start: 2022-03-31

## (undated) RX ORDER — HYDROCODONE BITARTRATE AND ACETAMINOPHEN 5; 325 MG/1; MG/1
TABLET ORAL
Status: DISPENSED
Start: 2017-08-04

## (undated) RX ORDER — METHYLPREDNISOLONE SODIUM SUCCINATE 125 MG/2ML
INJECTION, POWDER, LYOPHILIZED, FOR SOLUTION INTRAMUSCULAR; INTRAVENOUS
Status: DISPENSED
Start: 2024-02-28

## (undated) RX ORDER — DIPHENHYDRAMINE HCL 25 MG
CAPSULE ORAL
Status: DISPENSED
Start: 2024-09-06

## (undated) RX ORDER — ACETAMINOPHEN 325 MG/1
TABLET ORAL
Status: DISPENSED
Start: 2023-04-28

## (undated) RX ORDER — KETOROLAC TROMETHAMINE 30 MG/ML
INJECTION, SOLUTION INTRAMUSCULAR; INTRAVENOUS
Status: DISPENSED
Start: 2023-04-28

## (undated) RX ORDER — ACETAMINOPHEN 325 MG/1
TABLET ORAL
Status: DISPENSED
Start: 2024-02-28

## (undated) RX ORDER — ACETAMINOPHEN 325 MG/1
TABLET ORAL
Status: DISPENSED
Start: 2019-06-03

## (undated) RX ORDER — HYDROCORTISONE SODIUM SUCCINATE 100 MG/2ML
INJECTION INTRAMUSCULAR; INTRAVENOUS
Status: DISPENSED
Start: 2025-04-10

## (undated) RX ORDER — METHYLPREDNISOLONE SODIUM SUCCINATE 125 MG/2ML
INJECTION, POWDER, LYOPHILIZED, FOR SOLUTION INTRAMUSCULAR; INTRAVENOUS
Status: DISPENSED
Start: 2019-06-17

## (undated) RX ORDER — ACETAMINOPHEN 325 MG/1
TABLET ORAL
Status: DISPENSED
Start: 2019-12-05

## (undated) RX ORDER — ONDANSETRON 2 MG/ML
INJECTION INTRAMUSCULAR; INTRAVENOUS
Status: DISPENSED
Start: 2023-05-25

## (undated) RX ORDER — METHYLPREDNISOLONE SODIUM SUCCINATE 125 MG/2ML
INJECTION, POWDER, LYOPHILIZED, FOR SOLUTION INTRAMUSCULAR; INTRAVENOUS
Status: DISPENSED
Start: 2024-09-06

## (undated) RX ORDER — PROPOFOL 10 MG/ML
INJECTION, EMULSION INTRAVENOUS
Status: DISPENSED
Start: 2017-08-04

## (undated) RX ORDER — LIDOCAINE HYDROCHLORIDE 10 MG/ML
INJECTION, SOLUTION EPIDURAL; INFILTRATION; INTRACAUDAL; PERINEURAL
Status: DISPENSED
Start: 2025-08-19

## (undated) RX ORDER — FENTANYL CITRATE 50 UG/ML
INJECTION, SOLUTION INTRAMUSCULAR; INTRAVENOUS
Status: DISPENSED
Start: 2017-08-04

## (undated) RX ORDER — ACETAMINOPHEN 325 MG/1
TABLET ORAL
Status: DISPENSED
Start: 2025-04-10

## (undated) RX ORDER — DEXAMETHASONE SODIUM PHOSPHATE 4 MG/ML
INJECTION, SOLUTION INTRA-ARTICULAR; INTRALESIONAL; INTRAMUSCULAR; INTRAVENOUS; SOFT TISSUE
Status: DISPENSED
Start: 2017-08-04

## (undated) RX ORDER — ACETAMINOPHEN 325 MG/1
TABLET ORAL
Status: DISPENSED
Start: 2025-03-27

## (undated) RX ORDER — METHYLPREDNISOLONE SODIUM SUCCINATE 125 MG/2ML
INJECTION, POWDER, LYOPHILIZED, FOR SOLUTION INTRAMUSCULAR; INTRAVENOUS
Status: DISPENSED
Start: 2022-09-07

## (undated) RX ORDER — FENTANYL CITRATE 50 UG/ML
INJECTION, SOLUTION INTRAMUSCULAR; INTRAVENOUS
Status: DISPENSED
Start: 2023-04-28

## (undated) RX ORDER — ACETAMINOPHEN 325 MG/1
TABLET ORAL
Status: DISPENSED
Start: 2018-12-26

## (undated) RX ORDER — METHYLPREDNISOLONE SODIUM SUCCINATE 125 MG/2ML
INJECTION, POWDER, LYOPHILIZED, FOR SOLUTION INTRAMUSCULAR; INTRAVENOUS
Status: DISPENSED
Start: 2024-09-26

## (undated) RX ORDER — ACETAMINOPHEN 325 MG/1
TABLET ORAL
Status: DISPENSED
Start: 2019-12-19

## (undated) RX ORDER — DIPHENHYDRAMINE HYDROCHLORIDE 50 MG/ML
INJECTION INTRAMUSCULAR; INTRAVENOUS
Status: DISPENSED
Start: 2019-12-05

## (undated) RX ORDER — LIDOCAINE HYDROCHLORIDE 20 MG/ML
SOLUTION OROPHARYNGEAL
Status: DISPENSED
Start: 2022-02-04

## (undated) RX ORDER — PROPOFOL 10 MG/ML
INJECTION, EMULSION INTRAVENOUS
Status: DISPENSED
Start: 2023-04-28

## (undated) RX ORDER — ACETAMINOPHEN 325 MG/1
TABLET ORAL
Status: DISPENSED
Start: 2021-10-18

## (undated) RX ORDER — ONDANSETRON 2 MG/ML
INJECTION INTRAMUSCULAR; INTRAVENOUS
Status: DISPENSED
Start: 2017-08-04

## (undated) RX ORDER — ACETAMINOPHEN 325 MG/1
TABLET ORAL
Status: DISPENSED
Start: 2024-09-06

## (undated) RX ORDER — METHYLPREDNISOLONE SODIUM SUCCINATE 125 MG/2ML
INJECTION, POWDER, LYOPHILIZED, FOR SOLUTION INTRAMUSCULAR; INTRAVENOUS
Status: DISPENSED
Start: 2019-06-03

## (undated) RX ORDER — DIPHENHYDRAMINE HYDROCHLORIDE 50 MG/ML
INJECTION INTRAMUSCULAR; INTRAVENOUS
Status: DISPENSED
Start: 2024-02-12

## (undated) RX ORDER — ACETAMINOPHEN 325 MG/1
TABLET ORAL
Status: DISPENSED
Start: 2023-03-28

## (undated) RX ORDER — ACETAMINOPHEN 325 MG/1
TABLET ORAL
Status: DISPENSED
Start: 2022-09-07

## (undated) RX ORDER — METHYLPREDNISOLONE SODIUM SUCCINATE 125 MG/2ML
INJECTION INTRAMUSCULAR; INTRAVENOUS
Status: DISPENSED
Start: 2025-04-10

## (undated) RX ORDER — ACETAMINOPHEN 325 MG/1
TABLET ORAL
Status: DISPENSED
Start: 2019-06-17

## (undated) RX ORDER — ACETAMINOPHEN 325 MG/1
TABLET ORAL
Status: DISPENSED
Start: 2024-02-12

## (undated) RX ORDER — HYDROCORTISONE SODIUM SUCCINATE 100 MG/2ML
INJECTION INTRAMUSCULAR; INTRAVENOUS
Status: DISPENSED
Start: 2025-03-27

## (undated) RX ORDER — METHYLPREDNISOLONE SODIUM SUCCINATE 125 MG/2ML
INJECTION INTRAMUSCULAR; INTRAVENOUS
Status: DISPENSED
Start: 2025-03-27

## (undated) RX ORDER — METHYLPREDNISOLONE SODIUM SUCCINATE 125 MG/2ML
INJECTION, POWDER, LYOPHILIZED, FOR SOLUTION INTRAMUSCULAR; INTRAVENOUS
Status: DISPENSED
Start: 2022-04-06

## (undated) RX ORDER — ACETAMINOPHEN 325 MG/1
TABLET ORAL
Status: DISPENSED
Start: 2023-10-03

## (undated) RX ORDER — LIDOCAINE HYDROCHLORIDE 20 MG/ML
INJECTION, SOLUTION EPIDURAL; INFILTRATION; INTRACAUDAL; PERINEURAL
Status: DISPENSED
Start: 2017-08-04

## (undated) RX ORDER — METHYLPREDNISOLONE SODIUM SUCCINATE 125 MG/2ML
INJECTION, POWDER, LYOPHILIZED, FOR SOLUTION INTRAMUSCULAR; INTRAVENOUS
Status: DISPENSED
Start: 2024-02-12

## (undated) RX ORDER — LIDOCAINE HYDROCHLORIDE 20 MG/ML
SOLUTION OROPHARYNGEAL
Status: DISPENSED
Start: 2025-06-05

## (undated) RX ORDER — ACETAMINOPHEN 325 MG/1
TABLET ORAL
Status: DISPENSED
Start: 2023-09-19

## (undated) RX ORDER — LORAZEPAM 2 MG/ML
INJECTION INTRAMUSCULAR
Status: DISPENSED
Start: 2021-05-25

## (undated) RX ORDER — METHYLPREDNISOLONE SODIUM SUCCINATE 40 MG/ML
INJECTION INTRAMUSCULAR; INTRAVENOUS
Status: DISPENSED
Start: 2025-03-27